# Patient Record
Sex: MALE | Race: BLACK OR AFRICAN AMERICAN | Employment: OTHER
[De-identification: names, ages, dates, MRNs, and addresses within clinical notes are randomized per-mention and may not be internally consistent; named-entity substitution may affect disease eponyms.]

---

## 2017-01-03 ENCOUNTER — OFFICE VISIT (OUTPATIENT)
Dept: INTERNAL MEDICINE | Facility: CLINIC | Age: 27
End: 2017-01-03

## 2017-01-03 VITALS
BODY MASS INDEX: 35.61 KG/M2 | WEIGHT: 235 LBS | SYSTOLIC BLOOD PRESSURE: 136 MMHG | DIASTOLIC BLOOD PRESSURE: 86 MMHG | HEART RATE: 87 BPM | RESPIRATION RATE: 20 BRPM | TEMPERATURE: 97.1 F | HEIGHT: 68 IN

## 2017-01-03 DIAGNOSIS — G47.33 OSA (OBSTRUCTIVE SLEEP APNEA): ICD-10-CM

## 2017-01-03 DIAGNOSIS — L29.9 ITCHING: ICD-10-CM

## 2017-01-03 DIAGNOSIS — F51.01 PRIMARY INSOMNIA: ICD-10-CM

## 2017-01-03 DIAGNOSIS — F17.200 SMOKER: ICD-10-CM

## 2017-01-03 DIAGNOSIS — F41.9 ANXIETY: ICD-10-CM

## 2017-01-03 DIAGNOSIS — E66.9 OBESITY (BMI 35.0-39.9 WITHOUT COMORBIDITY): ICD-10-CM

## 2017-01-03 DIAGNOSIS — R05.3 CHRONIC COUGH: ICD-10-CM

## 2017-01-03 DIAGNOSIS — F33.41 RECURRENT MAJOR DEPRESSIVE DISORDER, IN PARTIAL REMISSION (HCC): ICD-10-CM

## 2017-01-03 DIAGNOSIS — M08.00 JUVENILE RHEUMATOID ARTHRITIS (HCC): Primary | ICD-10-CM

## 2017-01-03 PROCEDURE — 99213 OFFICE O/P EST LOW 20 MIN: CPT | Performed by: INTERNAL MEDICINE

## 2017-01-03 RX ORDER — VARENICLINE TARTRATE 1 MG/1
1 TABLET, FILM COATED ORAL 2 TIMES DAILY
Qty: 60 TABLET | Refills: 3 | Status: SHIPPED | OUTPATIENT
Start: 2017-01-03 | End: 2017-03-17

## 2017-01-03 RX ORDER — PREDNISONE 20 MG/1
20 TABLET ORAL DAILY
Qty: 30 TABLET | Refills: 5 | Status: SHIPPED | OUTPATIENT
Start: 2017-01-03 | End: 2017-02-06

## 2017-01-03 RX ORDER — QUETIAPINE FUMARATE 400 MG/1
400 TABLET, FILM COATED ORAL NIGHTLY
Qty: 30 TABLET | Refills: 3 | Status: SHIPPED | OUTPATIENT
Start: 2017-01-03 | End: 2017-03-17

## 2017-01-03 RX ORDER — PANTOPRAZOLE SODIUM 40 MG/1
40 TABLET, DELAYED RELEASE ORAL DAILY
Qty: 30 TABLET | Refills: 11 | Status: SHIPPED | OUTPATIENT
Start: 2017-01-03 | End: 2017-03-17

## 2017-01-03 RX ORDER — LANOLIN ALCOHOL/MO/W.PET/CERES
3 CREAM (GRAM) TOPICAL DAILY
Qty: 30 TABLET | Refills: 3 | Status: SHIPPED | OUTPATIENT
Start: 2017-01-03 | End: 2017-03-17

## 2017-01-03 RX ORDER — MELOXICAM 7.5 MG/1
7.5 TABLET ORAL DAILY
Qty: 30 TABLET | Refills: 2 | Status: CANCELLED | OUTPATIENT
Start: 2017-01-03

## 2017-01-03 RX ORDER — SWAB
5000 SWAB, NON-MEDICATED MISCELLANEOUS DAILY
Qty: 150 CAPSULE | Refills: 3 | Status: SHIPPED | OUTPATIENT
Start: 2017-01-03 | End: 2017-01-05 | Stop reason: DRUGHIGH

## 2017-01-03 RX ORDER — IBUPROFEN 600 MG/1
600 TABLET ORAL 3 TIMES DAILY
Qty: 90 TABLET | Refills: 5 | Status: SHIPPED | OUTPATIENT
Start: 2017-01-03 | End: 2017-01-03

## 2017-01-03 RX ORDER — CYCLOBENZAPRINE HCL 10 MG
10 TABLET ORAL
COMMUNITY
Start: 2016-12-26 | End: 2017-01-10

## 2017-01-03 RX ORDER — GUAIFENESIN AND CODEINE PHOSPHATE 100; 10 MG/5ML; MG/5ML
5 SOLUTION ORAL 2 TIMES DAILY PRN
Qty: 1 BOTTLE | Refills: 3 | Status: SHIPPED | OUTPATIENT
Start: 2017-01-03 | End: 2017-01-05 | Stop reason: DRUGHIGH

## 2017-01-03 RX ORDER — MELOXICAM 15 MG/1
15 TABLET ORAL DAILY
Qty: 30 TABLET | Refills: 11 | Status: SHIPPED | OUTPATIENT
Start: 2017-01-03 | End: 2017-05-01 | Stop reason: SDUPTHER

## 2017-01-03 RX ORDER — POLYETHYLENE GLYCOL 3350 17 G/17G
17 POWDER, FOR SOLUTION ORAL DAILY
Qty: 510 G | Refills: 3 | Status: SHIPPED | OUTPATIENT
Start: 2017-01-03 | End: 2017-05-01 | Stop reason: ALTCHOICE

## 2017-01-03 RX ORDER — ERYTHROMYCIN AND BENZOYL PEROXIDE 30; 50 MG/G; MG/G
GEL TOPICAL
Qty: 23.6 G | Refills: 11 | Status: SHIPPED | OUTPATIENT
Start: 2017-01-03 | End: 2017-02-02

## 2017-01-03 RX ORDER — PROMETHAZINE HYDROCHLORIDE 25 MG/1
25 TABLET ORAL EVERY 8 HOURS PRN
Qty: 60 TABLET | Refills: 1 | Status: SHIPPED | OUTPATIENT
Start: 2017-01-03 | End: 2017-01-10

## 2017-01-04 DIAGNOSIS — R05.3 CHRONIC COUGH: Primary | ICD-10-CM

## 2017-01-04 DIAGNOSIS — M08.00 JUVENILE RHEUMATOID ARTHRITIS (HCC): ICD-10-CM

## 2017-01-05 RX ORDER — ERGOCALCIFEROL 1.25 MG/1
50000 CAPSULE ORAL WEEKLY
Qty: 30 CAPSULE | Refills: 3 | Status: ON HOLD | OUTPATIENT
Start: 2017-01-05 | End: 2018-06-26

## 2017-01-05 RX ORDER — GUAIFENESIN/DEXTROMETHORPHAN 100-10MG/5
5 SYRUP ORAL 3 TIMES DAILY PRN
Qty: 120 ML | Refills: 2 | Status: SHIPPED | OUTPATIENT
Start: 2017-01-05 | End: 2017-01-15

## 2017-01-06 ENCOUNTER — TELEPHONE (OUTPATIENT)
Dept: INTERNAL MEDICINE | Facility: CLINIC | Age: 27
End: 2017-01-06

## 2017-01-06 DIAGNOSIS — F51.01 PRIMARY INSOMNIA: ICD-10-CM

## 2017-01-06 RX ORDER — RAMELTEON 8 MG/1
8 TABLET ORAL NIGHTLY PRN
Qty: 30 TABLET | Refills: 3 | Status: SHIPPED | OUTPATIENT
Start: 2017-01-06 | End: 2017-03-17

## 2017-01-09 ENCOUNTER — TELEPHONE (OUTPATIENT)
Dept: INTERNAL MEDICINE | Facility: CLINIC | Age: 27
End: 2017-01-09

## 2017-01-09 PROBLEM — F33.41 RECURRENT MAJOR DEPRESSIVE DISORDER, IN PARTIAL REMISSION (HCC): Status: ACTIVE | Noted: 2017-01-09

## 2017-01-12 ENCOUNTER — OFFICE VISIT (OUTPATIENT)
Dept: INTERNAL MEDICINE | Facility: CLINIC | Age: 27
End: 2017-01-12

## 2017-01-12 VITALS
BODY MASS INDEX: 34.07 KG/M2 | WEIGHT: 230 LBS | SYSTOLIC BLOOD PRESSURE: 142 MMHG | DIASTOLIC BLOOD PRESSURE: 88 MMHG | HEIGHT: 69 IN | HEART RATE: 100 BPM

## 2017-01-12 DIAGNOSIS — S33.5XXD LOW BACK SPRAIN, SUBSEQUENT ENCOUNTER: Primary | ICD-10-CM

## 2017-01-12 DIAGNOSIS — F33.2 SEVERE EPISODE OF RECURRENT MAJOR DEPRESSIVE DISORDER, WITHOUT PSYCHOTIC FEATURES (HCC): ICD-10-CM

## 2017-01-12 DIAGNOSIS — F33.2 MAJOR DEPRESSIVE DISORDER, RECURRENT SEVERE WITHOUT PSYCHOTIC FEATURES (HCC): ICD-10-CM

## 2017-01-12 PROCEDURE — 99213 OFFICE O/P EST LOW 20 MIN: CPT | Performed by: INTERNAL MEDICINE

## 2017-01-12 RX ORDER — CYCLOBENZAPRINE HCL 5 MG
5 TABLET ORAL NIGHTLY
Qty: 14 TABLET | Refills: 1 | Status: SHIPPED | OUTPATIENT
Start: 2017-01-12 | End: 2017-01-22

## 2017-01-13 ENCOUNTER — TELEPHONE (OUTPATIENT)
Dept: INTERNAL MEDICINE | Facility: CLINIC | Age: 27
End: 2017-01-13

## 2017-02-06 ENCOUNTER — HOSPITAL ENCOUNTER (EMERGENCY)
Age: 27
Discharge: HOME | End: 2017-02-06
Attending: EMERGENCY MEDICINE
Payer: COMMERCIAL

## 2017-02-06 ENCOUNTER — APPOINTMENT (OUTPATIENT)
Dept: CT IMAGING | Age: 27
End: 2017-02-06
Payer: COMMERCIAL

## 2017-02-06 ENCOUNTER — OFFICE VISIT (OUTPATIENT)
Dept: INTERNAL MEDICINE | Facility: CLINIC | Age: 27
End: 2017-02-06

## 2017-02-06 ENCOUNTER — APPOINTMENT (OUTPATIENT)
Dept: GENERAL RADIOLOGY | Age: 27
End: 2017-02-06
Payer: COMMERCIAL

## 2017-02-06 VITALS
WEIGHT: 225 LBS | TEMPERATURE: 100.8 F | BODY MASS INDEX: 33.33 KG/M2 | HEART RATE: 85 BPM | OXYGEN SATURATION: 97 % | SYSTOLIC BLOOD PRESSURE: 111 MMHG | DIASTOLIC BLOOD PRESSURE: 80 MMHG | RESPIRATION RATE: 18 BRPM | HEIGHT: 69 IN

## 2017-02-06 VITALS
HEART RATE: 123 BPM | HEIGHT: 69 IN | SYSTOLIC BLOOD PRESSURE: 136 MMHG | DIASTOLIC BLOOD PRESSURE: 90 MMHG | RESPIRATION RATE: 20 BRPM | TEMPERATURE: 98.3 F | OXYGEN SATURATION: 97 % | BODY MASS INDEX: 33.33 KG/M2 | WEIGHT: 225 LBS

## 2017-02-06 DIAGNOSIS — K52.9 GASTROENTERITIS: Primary | ICD-10-CM

## 2017-02-06 DIAGNOSIS — M08.00 JUVENILE RHEUMATOID ARTHRITIS (HCC): ICD-10-CM

## 2017-02-06 DIAGNOSIS — B34.9 VIRAL SYNDROME: Primary | ICD-10-CM

## 2017-02-06 LAB
-: NORMAL
ABSOLUTE EOS #: 0.3 K/UL (ref 0–0.4)
ABSOLUTE LYMPH #: 1.4 K/UL (ref 1–4.8)
ABSOLUTE MONO #: 0.5 K/UL (ref 0.1–1.2)
ALBUMIN SERPL-MCNC: 4.3 G/DL (ref 3.5–5.2)
ALBUMIN/GLOBULIN RATIO: 1.1 (ref 1–2.5)
ALP BLD-CCNC: 80 U/L (ref 40–129)
ALT SERPL-CCNC: 52 U/L (ref 5–41)
AMORPHOUS: NORMAL
ANION GAP SERPL CALCULATED.3IONS-SCNC: 13 MMOL/L (ref 9–17)
AST SERPL-CCNC: 42 U/L
BACTERIA: NORMAL
BASOPHILS # BLD: 0 % (ref 0–2)
BASOPHILS ABSOLUTE: 0 K/UL (ref 0–0.2)
BILIRUB SERPL-MCNC: 0.3 MG/DL (ref 0.3–1.2)
BILIRUBIN DIRECT: <0.08 MG/DL
BILIRUBIN URINE: NEGATIVE
BILIRUBIN, INDIRECT: ABNORMAL MG/DL (ref 0–1)
BUN BLDV-MCNC: 10 MG/DL (ref 6–20)
BUN/CREAT BLD: NORMAL (ref 9–20)
CALCIUM SERPL-MCNC: 9.4 MG/DL (ref 8.6–10.4)
CASTS UA: NORMAL /LPF (ref 0–8)
CHLORIDE BLD-SCNC: 99 MMOL/L (ref 98–107)
CO2: 26 MMOL/L (ref 20–31)
COLOR: YELLOW
CREAT SERPL-MCNC: 0.87 MG/DL (ref 0.7–1.2)
CRYSTALS, UA: NORMAL /HPF
DIFFERENTIAL TYPE: ABNORMAL
DIRECT EXAM: NORMAL
EOSINOPHILS RELATIVE PERCENT: 5 % (ref 1–4)
EPITHELIAL CELLS UA: NORMAL /HPF (ref 0–5)
GFR AFRICAN AMERICAN: >60 ML/MIN
GFR NON-AFRICAN AMERICAN: >60 ML/MIN
GFR SERPL CREATININE-BSD FRML MDRD: NORMAL ML/MIN/{1.73_M2}
GFR SERPL CREATININE-BSD FRML MDRD: NORMAL ML/MIN/{1.73_M2}
GLOBULIN: ABNORMAL G/DL (ref 1.5–3.8)
GLUCOSE BLD-MCNC: 95 MG/DL (ref 70–99)
GLUCOSE URINE: NEGATIVE
HCT VFR BLD CALC: 43.3 % (ref 41–53)
HEMOGLOBIN: 14.8 G/DL (ref 13.5–17.5)
KETONES, URINE: NEGATIVE
LACTIC ACID, WHOLE BLOOD: 1.1 MMOL/L (ref 0.7–2.1)
LACTIC ACID: NORMAL MMOL/L
LEUKOCYTE ESTERASE, URINE: NEGATIVE
LIPASE: 26 U/L (ref 13–60)
LYMPHOCYTES # BLD: 24 % (ref 24–44)
Lab: NORMAL
MCH RBC QN AUTO: 29.5 PG (ref 26–34)
MCHC RBC AUTO-ENTMCNC: 34.1 G/DL (ref 31–37)
MCV RBC AUTO: 86.3 FL (ref 80–100)
MONOCYTES # BLD: 9 % (ref 2–11)
MUCUS: NORMAL
NITRITE, URINE: NEGATIVE
OTHER OBSERVATIONS UA: NORMAL
PDW BLD-RTO: 13.2 % (ref 12.5–15.4)
PH UA: 6 (ref 5–8)
PLATELET # BLD: 291 K/UL (ref 140–450)
PLATELET ESTIMATE: ABNORMAL
PMV BLD AUTO: 7.8 FL (ref 6–12)
POC TROPONIN I: 0 NG/ML (ref 0–0.1)
POC TROPONIN INTERP: NORMAL
POTASSIUM SERPL-SCNC: 4.3 MMOL/L (ref 3.7–5.3)
PROTEIN UA: NEGATIVE
RBC # BLD: 5.01 M/UL (ref 4.5–5.9)
RBC # BLD: ABNORMAL 10*6/UL
RBC UA: NORMAL /HPF (ref 0–4)
RENAL EPITHELIAL, UA: NORMAL /HPF
SEG NEUTROPHILS: 62 % (ref 36–66)
SEGMENTED NEUTROPHILS ABSOLUTE COUNT: 3.6 K/UL (ref 1.8–7.7)
SODIUM BLD-SCNC: 138 MMOL/L (ref 135–144)
SPECIFIC GRAVITY UA: 1.03 (ref 1–1.03)
SPECIMEN DESCRIPTION: NORMAL
STATUS: NORMAL
TOTAL PROTEIN: 8.1 G/DL (ref 6.4–8.3)
TRICHOMONAS: NORMAL
TURBIDITY: CLEAR
URINE HGB: NEGATIVE
UROBILINOGEN, URINE: NORMAL
WBC # BLD: 5.9 K/UL (ref 3.5–11)
WBC # BLD: ABNORMAL 10*3/UL
WBC UA: NORMAL /HPF (ref 0–5)
YEAST: NORMAL

## 2017-02-06 PROCEDURE — 71020 XR CHEST STANDARD TWO VW: CPT | Performed by: RADIOLOGY

## 2017-02-06 PROCEDURE — 6360000002 HC RX W HCPCS: Performed by: EMERGENCY MEDICINE

## 2017-02-06 PROCEDURE — 81001 URINALYSIS AUTO W/SCOPE: CPT

## 2017-02-06 PROCEDURE — 80076 HEPATIC FUNCTION PANEL: CPT

## 2017-02-06 PROCEDURE — 85025 COMPLETE CBC W/AUTO DIFF WBC: CPT

## 2017-02-06 PROCEDURE — 6360000002 HC RX W HCPCS: Performed by: PHYSICIAN ASSISTANT

## 2017-02-06 PROCEDURE — 74176 CT ABD & PELVIS W/O CONTRAST: CPT

## 2017-02-06 PROCEDURE — 83605 ASSAY OF LACTIC ACID: CPT

## 2017-02-06 PROCEDURE — 93005 ELECTROCARDIOGRAM TRACING: CPT

## 2017-02-06 PROCEDURE — 99213 OFFICE O/P EST LOW 20 MIN: CPT | Performed by: HOSPITALIST

## 2017-02-06 PROCEDURE — 2580000003 HC RX 258: Performed by: PHYSICIAN ASSISTANT

## 2017-02-06 PROCEDURE — 83690 ASSAY OF LIPASE: CPT

## 2017-02-06 PROCEDURE — G0384 LEV 5 HOSP TYPE B ED VISIT: HCPCS

## 2017-02-06 PROCEDURE — 96374 THER/PROPH/DIAG INJ IV PUSH: CPT

## 2017-02-06 PROCEDURE — 87804 INFLUENZA ASSAY W/OPTIC: CPT

## 2017-02-06 PROCEDURE — 84484 ASSAY OF TROPONIN QUANT: CPT

## 2017-02-06 PROCEDURE — 74176 CT ABD & PELVIS W/O CONTRAST: CPT | Performed by: RADIOLOGY

## 2017-02-06 PROCEDURE — 71020 XR CHEST STANDARD TWO VW: CPT

## 2017-02-06 PROCEDURE — 80048 BASIC METABOLIC PNL TOTAL CA: CPT

## 2017-02-06 PROCEDURE — 96375 TX/PRO/DX INJ NEW DRUG ADDON: CPT

## 2017-02-06 RX ORDER — MORPHINE SULFATE 4 MG/ML
4 INJECTION, SOLUTION INTRAMUSCULAR; INTRAVENOUS ONCE
Status: COMPLETED | OUTPATIENT
Start: 2017-02-06 | End: 2017-02-06

## 2017-02-06 RX ORDER — METHYLPREDNISOLONE SODIUM SUCCINATE 125 MG/2ML
125 INJECTION, POWDER, LYOPHILIZED, FOR SOLUTION INTRAMUSCULAR; INTRAVENOUS ONCE
Status: COMPLETED | OUTPATIENT
Start: 2017-02-06 | End: 2017-02-06

## 2017-02-06 RX ORDER — CLOTRIMAZOLE 1 %
CREAM (GRAM) TOPICAL
COMMUNITY
End: 2017-05-01 | Stop reason: SDUPTHER

## 2017-02-06 RX ORDER — 0.9 % SODIUM CHLORIDE 0.9 %
1000 INTRAVENOUS SOLUTION INTRAVENOUS ONCE
Status: COMPLETED | OUTPATIENT
Start: 2017-02-06 | End: 2017-02-06

## 2017-02-06 RX ORDER — KETOROLAC TROMETHAMINE 30 MG/ML
30 INJECTION, SOLUTION INTRAMUSCULAR; INTRAVENOUS ONCE
Status: COMPLETED | OUTPATIENT
Start: 2017-02-06 | End: 2017-02-06

## 2017-02-06 RX ORDER — ONDANSETRON 2 MG/ML
4 INJECTION INTRAMUSCULAR; INTRAVENOUS ONCE
Status: COMPLETED | OUTPATIENT
Start: 2017-02-06 | End: 2017-02-06

## 2017-02-06 RX ORDER — PREDNISONE 20 MG/1
40 TABLET ORAL DAILY
Qty: 10 TABLET | Refills: 0 | Status: SHIPPED | OUTPATIENT
Start: 2017-02-06 | End: 2017-02-11

## 2017-02-06 RX ADMIN — HYDROMORPHONE HYDROCHLORIDE 1 MG: 1 INJECTION, SOLUTION INTRAMUSCULAR; INTRAVENOUS; SUBCUTANEOUS at 14:19

## 2017-02-06 RX ADMIN — ONDANSETRON 4 MG: 2 INJECTION, SOLUTION INTRAMUSCULAR; INTRAVENOUS at 12:56

## 2017-02-06 RX ADMIN — METHYLPREDNISOLONE SODIUM SUCCINATE 125 MG: 125 INJECTION, POWDER, FOR SOLUTION INTRAMUSCULAR; INTRAVENOUS at 12:59

## 2017-02-06 RX ADMIN — MORPHINE SULFATE 4 MG: 4 INJECTION, SOLUTION INTRAMUSCULAR; INTRAVENOUS at 12:57

## 2017-02-06 RX ADMIN — KETOROLAC TROMETHAMINE 30 MG: 30 INJECTION, SOLUTION INTRAMUSCULAR at 15:31

## 2017-02-06 RX ADMIN — SODIUM CHLORIDE 1000 ML: 9 INJECTION, SOLUTION INTRAVENOUS at 15:31

## 2017-02-06 RX ADMIN — SODIUM CHLORIDE 1000 ML: 9 INJECTION, SOLUTION INTRAVENOUS at 12:55

## 2017-02-06 ASSESSMENT — ENCOUNTER SYMPTOMS
SORE THROAT: 0
VOMITING: 1
ALLERGIC/IMMUNOLOGIC NEGATIVE: 1
SHORTNESS OF BREATH: 1
PHOTOPHOBIA: 0
TROUBLE SWALLOWING: 0
NAUSEA: 1
COUGH: 0
SINUS PRESSURE: 0
DIARRHEA: 1
EYE DISCHARGE: 0
EYES NEGATIVE: 1
ABDOMINAL PAIN: 1
VOICE CHANGE: 0

## 2017-02-06 ASSESSMENT — PATIENT HEALTH QUESTIONNAIRE - PHQ9
1. LITTLE INTEREST OR PLEASURE IN DOING THINGS: 0
2. FEELING DOWN, DEPRESSED OR HOPELESS: 0
SUM OF ALL RESPONSES TO PHQ9 QUESTIONS 1 & 2: 0
SUM OF ALL RESPONSES TO PHQ QUESTIONS 1-9: 0

## 2017-02-06 ASSESSMENT — PAIN DESCRIPTION - LOCATION: LOCATION: CHEST;ABDOMEN

## 2017-02-06 ASSESSMENT — PAIN SCALES - GENERAL
PAINLEVEL_OUTOF10: 9
PAINLEVEL_OUTOF10: 9
PAINLEVEL_OUTOF10: 10
PAINLEVEL_OUTOF10: 9

## 2017-02-06 ASSESSMENT — PAIN DESCRIPTION - PAIN TYPE: TYPE: ACUTE PAIN

## 2017-02-07 LAB
EKG ATRIAL RATE: 96 BPM
EKG P AXIS: 41 DEGREES
EKG P-R INTERVAL: 134 MS
EKG Q-T INTERVAL: 316 MS
EKG QRS DURATION: 70 MS
EKG QTC CALCULATION (BAZETT): 399 MS
EKG R AXIS: 35 DEGREES
EKG T AXIS: 14 DEGREES
EKG VENTRICULAR RATE: 96 BPM

## 2017-02-14 ENCOUNTER — OFFICE VISIT (OUTPATIENT)
Dept: INTERNAL MEDICINE | Facility: CLINIC | Age: 27
End: 2017-02-14

## 2017-02-14 ENCOUNTER — CASE MANAGEMENT (OUTPATIENT)
Dept: BEHAVIORAL/MENTAL HEALTH | Facility: CLINIC | Age: 27
End: 2017-02-14

## 2017-02-14 VITALS
SYSTOLIC BLOOD PRESSURE: 130 MMHG | WEIGHT: 228 LBS | HEIGHT: 69 IN | HEART RATE: 106 BPM | DIASTOLIC BLOOD PRESSURE: 85 MMHG | BODY MASS INDEX: 33.77 KG/M2

## 2017-02-14 DIAGNOSIS — K80.46 CALCULUS OF BILE DUCT WITH ACUTE ON CHRONIC CHOLECYSTITIS WITHOUT OBSTRUCTION: ICD-10-CM

## 2017-02-14 DIAGNOSIS — R07.82 INTERCOSTAL PAIN: Primary | ICD-10-CM

## 2017-02-14 DIAGNOSIS — R07.82 INTERCOSTAL PAIN: ICD-10-CM

## 2017-02-14 PROBLEM — J45.20 MILD INTERMITTENT ASTHMA WITHOUT COMPLICATION: Status: ACTIVE | Noted: 2017-02-14

## 2017-02-14 PROBLEM — F33.9 RECURRENT MAJOR DEPRESSIVE DISORDER (HCC): Status: ACTIVE | Noted: 2017-02-14

## 2017-02-14 PROCEDURE — 99213 OFFICE O/P EST LOW 20 MIN: CPT | Performed by: INTERNAL MEDICINE

## 2017-02-14 PROCEDURE — 93005 ELECTROCARDIOGRAM TRACING: CPT | Performed by: INTERNAL MEDICINE

## 2017-02-14 RX ORDER — PROMETHAZINE HYDROCHLORIDE 25 MG/1
25 TABLET ORAL EVERY 6 HOURS PRN
Qty: 30 TABLET | Refills: 1 | Status: SHIPPED | OUTPATIENT
Start: 2017-02-14 | End: 2017-02-14 | Stop reason: CLARIF

## 2017-02-14 RX ORDER — GABAPENTIN 300 MG/1
300 CAPSULE ORAL DAILY
Qty: 90 CAPSULE | Refills: 1 | Status: SHIPPED | OUTPATIENT
Start: 2017-02-14 | End: 2017-02-14 | Stop reason: CLARIF

## 2017-02-16 ENCOUNTER — APPOINTMENT (OUTPATIENT)
Dept: GENERAL RADIOLOGY | Age: 27
End: 2017-02-16
Payer: COMMERCIAL

## 2017-02-16 ENCOUNTER — HOSPITAL ENCOUNTER (EMERGENCY)
Age: 27
Discharge: HOME | End: 2017-02-16
Attending: EMERGENCY MEDICINE
Payer: COMMERCIAL

## 2017-02-16 VITALS
HEIGHT: 69 IN | WEIGHT: 240 LBS | SYSTOLIC BLOOD PRESSURE: 110 MMHG | BODY MASS INDEX: 35.55 KG/M2 | DIASTOLIC BLOOD PRESSURE: 57 MMHG | TEMPERATURE: 97.9 F | OXYGEN SATURATION: 98 % | HEART RATE: 78 BPM | RESPIRATION RATE: 16 BRPM

## 2017-02-16 DIAGNOSIS — M79.662 BILATERAL CALF PAIN: ICD-10-CM

## 2017-02-16 DIAGNOSIS — M79.661 BILATERAL CALF PAIN: ICD-10-CM

## 2017-02-16 DIAGNOSIS — R10.11 RIGHT UPPER QUADRANT ABDOMINAL PAIN: Primary | ICD-10-CM

## 2017-02-16 DIAGNOSIS — R07.9 CHEST PAIN, UNSPECIFIED TYPE: ICD-10-CM

## 2017-02-16 DIAGNOSIS — R11.0 NAUSEA: ICD-10-CM

## 2017-02-16 LAB
ABSOLUTE EOS #: 0.1 K/UL (ref 0–0.4)
ABSOLUTE LYMPH #: 1.9 K/UL (ref 1–4.8)
ABSOLUTE MONO #: 0.4 K/UL (ref 0.1–1.2)
ALBUMIN SERPL-MCNC: 4.5 G/DL (ref 3.5–5.2)
ALBUMIN/GLOBULIN RATIO: 1.4 (ref 1–2.5)
ALP BLD-CCNC: 68 U/L (ref 40–129)
ALT SERPL-CCNC: 22 U/L (ref 5–41)
ANION GAP SERPL CALCULATED.3IONS-SCNC: 13 MMOL/L (ref 9–17)
AST SERPL-CCNC: 28 U/L
BASOPHILS # BLD: 1 % (ref 0–2)
BASOPHILS ABSOLUTE: 0.1 K/UL (ref 0–0.2)
BILIRUB SERPL-MCNC: 0.31 MG/DL (ref 0.3–1.2)
BILIRUBIN DIRECT: <0.08 MG/DL
BILIRUBIN, INDIRECT: NORMAL MG/DL (ref 0–1)
BUN BLDV-MCNC: 14 MG/DL (ref 6–20)
BUN/CREAT BLD: ABNORMAL (ref 9–20)
CALCIUM SERPL-MCNC: 9.2 MG/DL (ref 8.6–10.4)
CHLORIDE BLD-SCNC: 101 MMOL/L (ref 98–107)
CO2: 26 MMOL/L (ref 20–31)
CREAT SERPL-MCNC: 0.75 MG/DL (ref 0.7–1.2)
D-DIMER QUANTITATIVE: 0.23 MG/L FEU
DIFFERENTIAL TYPE: ABNORMAL
EOSINOPHILS RELATIVE PERCENT: 1 % (ref 1–4)
GFR AFRICAN AMERICAN: >60 ML/MIN
GFR NON-AFRICAN AMERICAN: >60 ML/MIN
GFR SERPL CREATININE-BSD FRML MDRD: ABNORMAL ML/MIN/{1.73_M2}
GFR SERPL CREATININE-BSD FRML MDRD: ABNORMAL ML/MIN/{1.73_M2}
GLOBULIN: NORMAL G/DL (ref 1.5–3.8)
GLUCOSE BLD-MCNC: 101 MG/DL (ref 70–99)
HCT VFR BLD CALC: 40.7 % (ref 41–53)
HEMOGLOBIN: 13.9 G/DL (ref 13.5–17.5)
LIPASE: 46 U/L (ref 13–60)
LYMPHOCYTES # BLD: 26 % (ref 24–44)
MCH RBC QN AUTO: 29.4 PG (ref 26–34)
MCHC RBC AUTO-ENTMCNC: 34.1 G/DL (ref 31–37)
MCV RBC AUTO: 86.3 FL (ref 80–100)
MONOCYTES # BLD: 5 % (ref 2–11)
PDW BLD-RTO: 13.2 % (ref 12.5–15.4)
PLATELET # BLD: 277 K/UL (ref 140–450)
PLATELET ESTIMATE: ABNORMAL
PMV BLD AUTO: 8 FL (ref 6–12)
POTASSIUM SERPL-SCNC: 5 MMOL/L (ref 3.7–5.3)
RBC # BLD: 4.71 M/UL (ref 4.5–5.9)
RBC # BLD: ABNORMAL 10*6/UL
SEG NEUTROPHILS: 67 % (ref 36–66)
SEGMENTED NEUTROPHILS ABSOLUTE COUNT: 4.9 K/UL (ref 1.8–7.7)
SODIUM BLD-SCNC: 140 MMOL/L (ref 135–144)
TOTAL PROTEIN: 7.7 G/DL (ref 6.4–8.3)
WBC # BLD: 7.4 K/UL (ref 3.5–11)
WBC # BLD: ABNORMAL 10*3/UL

## 2017-02-16 PROCEDURE — 85025 COMPLETE CBC W/AUTO DIFF WBC: CPT

## 2017-02-16 PROCEDURE — 80048 BASIC METABOLIC PNL TOTAL CA: CPT

## 2017-02-16 PROCEDURE — 96375 TX/PRO/DX INJ NEW DRUG ADDON: CPT

## 2017-02-16 PROCEDURE — 85379 FIBRIN DEGRADATION QUANT: CPT

## 2017-02-16 PROCEDURE — 80076 HEPATIC FUNCTION PANEL: CPT

## 2017-02-16 PROCEDURE — 83690 ASSAY OF LIPASE: CPT

## 2017-02-16 PROCEDURE — 93005 ELECTROCARDIOGRAM TRACING: CPT

## 2017-02-16 PROCEDURE — 96374 THER/PROPH/DIAG INJ IV PUSH: CPT

## 2017-02-16 PROCEDURE — 99285 EMERGENCY DEPT VISIT HI MDM: CPT

## 2017-02-16 PROCEDURE — 2580000003 HC RX 258: Performed by: EMERGENCY MEDICINE

## 2017-02-16 PROCEDURE — 93970 EXTREMITY STUDY: CPT

## 2017-02-16 PROCEDURE — 6360000002 HC RX W HCPCS: Performed by: EMERGENCY MEDICINE

## 2017-02-16 PROCEDURE — 71020 XR CHEST STANDARD TWO VW: CPT

## 2017-02-16 RX ORDER — ACETAMINOPHEN 500 MG
1000 TABLET ORAL ONCE
Status: DISCONTINUED | OUTPATIENT
Start: 2017-02-16 | End: 2017-02-16 | Stop reason: HOSPADM

## 2017-02-16 RX ORDER — PROMETHAZINE HYDROCHLORIDE 25 MG/ML
12.5 INJECTION, SOLUTION INTRAMUSCULAR; INTRAVENOUS ONCE
Status: COMPLETED | OUTPATIENT
Start: 2017-02-16 | End: 2017-02-16

## 2017-02-16 RX ORDER — ONDANSETRON 2 MG/ML
4 INJECTION INTRAMUSCULAR; INTRAVENOUS ONCE
Status: COMPLETED | OUTPATIENT
Start: 2017-02-16 | End: 2017-02-16

## 2017-02-16 RX ORDER — 0.9 % SODIUM CHLORIDE 0.9 %
1000 INTRAVENOUS SOLUTION INTRAVENOUS ONCE
Status: COMPLETED | OUTPATIENT
Start: 2017-02-16 | End: 2017-02-16

## 2017-02-16 RX ADMIN — PROMETHAZINE HYDROCHLORIDE 12.5 MG: 25 INJECTION INTRAMUSCULAR; INTRAVENOUS at 14:50

## 2017-02-16 RX ADMIN — ONDANSETRON 4 MG: 2 INJECTION, SOLUTION INTRAMUSCULAR; INTRAVENOUS at 13:04

## 2017-02-16 RX ADMIN — SODIUM CHLORIDE 1000 ML: 9 INJECTION, SOLUTION INTRAVENOUS at 13:04

## 2017-02-16 ASSESSMENT — PAIN DESCRIPTION - DESCRIPTORS
DESCRIPTORS: CONSTANT
DESCRIPTORS_2: CONSTANT

## 2017-02-16 ASSESSMENT — ENCOUNTER SYMPTOMS
VOMITING: 1
ALLERGIC/IMMUNOLOGIC NEGATIVE: 1
CONSTIPATION: 0
DIARRHEA: 1
ABDOMINAL PAIN: 1
COUGH: 0
NAUSEA: 1
SHORTNESS OF BREATH: 0

## 2017-02-16 ASSESSMENT — PAIN DESCRIPTION - DURATION: DURATION_2: CONTINUOUS

## 2017-02-16 ASSESSMENT — PAIN DESCRIPTION - ONSET
ONSET: GRADUAL
ONSET_2: GRADUAL

## 2017-02-16 ASSESSMENT — PAIN DESCRIPTION - FREQUENCY: FREQUENCY: CONTINUOUS

## 2017-02-16 ASSESSMENT — PAIN DESCRIPTION - ORIENTATION
ORIENTATION_2: MID
ORIENTATION: RIGHT;LOWER

## 2017-02-16 ASSESSMENT — PAIN SCALES - GENERAL: PAINLEVEL_OUTOF10: 9

## 2017-02-16 ASSESSMENT — PAIN DESCRIPTION - PAIN TYPE: TYPE: ACUTE PAIN

## 2017-02-16 ASSESSMENT — PAIN DESCRIPTION - INTENSITY: RATING_2: 10

## 2017-02-16 ASSESSMENT — PAIN DESCRIPTION - LOCATION
LOCATION: ABDOMEN
LOCATION_2: CHEST

## 2017-02-17 LAB
EKG ATRIAL RATE: 107 BPM
EKG ATRIAL RATE: 81 BPM
EKG P AXIS: 61 DEGREES
EKG P AXIS: 65 DEGREES
EKG P-R INTERVAL: 128 MS
EKG P-R INTERVAL: 134 MS
EKG Q-T INTERVAL: 320 MS
EKG Q-T INTERVAL: 350 MS
EKG QRS DURATION: 82 MS
EKG QRS DURATION: 84 MS
EKG QTC CALCULATION (BAZETT): 406 MS
EKG QTC CALCULATION (BAZETT): 427 MS
EKG R AXIS: 58 DEGREES
EKG R AXIS: 63 DEGREES
EKG T AXIS: 41 DEGREES
EKG T AXIS: 44 DEGREES
EKG VENTRICULAR RATE: 107 BPM
EKG VENTRICULAR RATE: 81 BPM

## 2017-02-21 ENCOUNTER — HOSPITAL ENCOUNTER (OUTPATIENT)
Age: 27
Setting detail: SPECIMEN
Discharge: HOME | End: 2017-02-21
Payer: COMMERCIAL

## 2017-02-21 LAB
ABSOLUTE EOS #: 0.2 K/UL (ref 0–0.4)
ABSOLUTE LYMPH #: 2.9 K/UL (ref 1–4.8)
ABSOLUTE MONO #: 0.6 K/UL (ref 0.1–1.2)
ALBUMIN SERPL-MCNC: 4.8 G/DL (ref 3.5–5.2)
ALBUMIN/GLOBULIN RATIO: 1.5 (ref 1–2.5)
ALP BLD-CCNC: 78 U/L (ref 40–129)
ALT SERPL-CCNC: 29 U/L (ref 5–41)
ANION GAP SERPL CALCULATED.3IONS-SCNC: 18 MMOL/L (ref 9–17)
AST SERPL-CCNC: 28 U/L
BASOPHILS # BLD: 0 % (ref 0–2)
BASOPHILS ABSOLUTE: 0 K/UL (ref 0–0.2)
BILIRUB SERPL-MCNC: 0.29 MG/DL (ref 0.3–1.2)
BUN BLDV-MCNC: 11 MG/DL (ref 6–20)
BUN/CREAT BLD: ABNORMAL (ref 9–20)
CALCIUM SERPL-MCNC: 9.6 MG/DL (ref 8.6–10.4)
CHLORIDE BLD-SCNC: 100 MMOL/L (ref 98–107)
CHOLESTEROL/HDL RATIO: 4.7
CHOLESTEROL: 205 MG/DL
CO2: 24 MMOL/L (ref 20–31)
CREAT SERPL-MCNC: 0.88 MG/DL (ref 0.7–1.2)
DIFFERENTIAL TYPE: NORMAL
EOSINOPHILS RELATIVE PERCENT: 3 % (ref 1–4)
GFR AFRICAN AMERICAN: >60 ML/MIN
GFR NON-AFRICAN AMERICAN: >60 ML/MIN
GFR SERPL CREATININE-BSD FRML MDRD: ABNORMAL ML/MIN/{1.73_M2}
GFR SERPL CREATININE-BSD FRML MDRD: ABNORMAL ML/MIN/{1.73_M2}
GLUCOSE BLD-MCNC: 106 MG/DL (ref 70–99)
HAV IGM SER IA-ACNC: NONREACTIVE
HCT VFR BLD CALC: 42.1 % (ref 41–53)
HDLC SERPL-MCNC: 44 MG/DL
HEMOGLOBIN: 14.5 G/DL (ref 13.5–17.5)
HEPATITIS B CORE IGM ANTIBODY: NONREACTIVE
HEPATITIS B SURFACE ANTIGEN: NONREACTIVE
HEPATITIS C ANTIBODY: NONREACTIVE
HIV AG/AB: NONREACTIVE
LDL CHOLESTEROL: 123 MG/DL (ref 0–130)
LYMPHOCYTES # BLD: 33 % (ref 24–44)
MCH RBC QN AUTO: 29.5 PG (ref 26–34)
MCHC RBC AUTO-ENTMCNC: 34.5 G/DL (ref 31–37)
MCV RBC AUTO: 85.6 FL (ref 80–100)
MONOCYTES # BLD: 7 % (ref 2–11)
PDW BLD-RTO: 13.6 % (ref 12.5–15.4)
PLATELET # BLD: 313 K/UL (ref 140–450)
PLATELET ESTIMATE: NORMAL
PMV BLD AUTO: 8.4 FL (ref 6–12)
POTASSIUM SERPL-SCNC: 4.1 MMOL/L (ref 3.7–5.3)
RBC # BLD: 4.92 M/UL (ref 4.5–5.9)
RBC # BLD: NORMAL 10*6/UL
SEG NEUTROPHILS: 57 % (ref 36–66)
SEGMENTED NEUTROPHILS ABSOLUTE COUNT: 4.8 K/UL (ref 1.8–7.7)
SODIUM BLD-SCNC: 142 MMOL/L (ref 135–144)
T3 FREE: 2.66 PG/ML (ref 2.02–4.43)
THYROXINE, FREE: 1.18 NG/DL (ref 0.93–1.7)
TOTAL PROTEIN: 7.9 G/DL (ref 6.4–8.3)
TRIGL SERPL-MCNC: 189 MG/DL
TSH SERPL DL<=0.05 MIU/L-ACNC: 1.86 MIU/L (ref 0.3–5)
VLDLC SERPL CALC-MCNC: ABNORMAL MG/DL (ref 1–30)
WBC # BLD: 8.5 K/UL (ref 3.5–11)
WBC # BLD: NORMAL 10*3/UL

## 2017-02-21 PROCEDURE — 84443 ASSAY THYROID STIM HORMONE: CPT

## 2017-02-21 PROCEDURE — 84481 FREE ASSAY (FT-3): CPT

## 2017-02-21 PROCEDURE — 80061 LIPID PANEL: CPT

## 2017-02-21 PROCEDURE — 36415 COLL VENOUS BLD VENIPUNCTURE: CPT

## 2017-02-21 PROCEDURE — 84439 ASSAY OF FREE THYROXINE: CPT

## 2017-02-21 PROCEDURE — 87389 HIV-1 AG W/HIV-1&-2 AB AG IA: CPT

## 2017-02-21 PROCEDURE — 80074 ACUTE HEPATITIS PANEL: CPT

## 2017-02-21 PROCEDURE — 85025 COMPLETE CBC W/AUTO DIFF WBC: CPT

## 2017-02-21 PROCEDURE — 80053 COMPREHEN METABOLIC PANEL: CPT

## 2017-03-01 RX ORDER — GUAIFENESIN AND DEXTROMETHORPHAN HYDROBROMIDE 100; 10 MG/5ML; MG/5ML
LIQUID ORAL
Qty: 1 BOTTLE | Refills: 0 | Status: SHIPPED | OUTPATIENT
Start: 2017-03-01 | End: 2017-05-01 | Stop reason: ALTCHOICE

## 2017-03-05 ENCOUNTER — HOSPITAL ENCOUNTER (EMERGENCY)
Age: 27
Discharge: HOME | End: 2017-03-05
Attending: EMERGENCY MEDICINE
Payer: COMMERCIAL

## 2017-03-05 VITALS
DIASTOLIC BLOOD PRESSURE: 89 MMHG | BODY MASS INDEX: 32.49 KG/M2 | HEART RATE: 94 BPM | OXYGEN SATURATION: 98 % | TEMPERATURE: 97.9 F | SYSTOLIC BLOOD PRESSURE: 159 MMHG | WEIGHT: 220 LBS | RESPIRATION RATE: 18 BRPM

## 2017-03-05 DIAGNOSIS — R11.2 NAUSEA AND VOMITING, INTRACTABILITY OF VOMITING NOT SPECIFIED, UNSPECIFIED VOMITING TYPE: ICD-10-CM

## 2017-03-05 DIAGNOSIS — G43.909 MIGRAINE WITHOUT STATUS MIGRAINOSUS, NOT INTRACTABLE, UNSPECIFIED MIGRAINE TYPE: Primary | ICD-10-CM

## 2017-03-05 LAB
ABSOLUTE EOS #: 0.3 K/UL (ref 0–0.4)
ABSOLUTE LYMPH #: 1.9 K/UL (ref 1–4.8)
ABSOLUTE MONO #: 0.5 K/UL (ref 0.1–1.2)
ALBUMIN SERPL-MCNC: 4.2 G/DL (ref 3.5–5.2)
ALBUMIN/GLOBULIN RATIO: NORMAL (ref 1–2.5)
ALP BLD-CCNC: 64 U/L (ref 40–129)
ALT SERPL-CCNC: 29 U/L (ref 5–41)
ANION GAP SERPL CALCULATED.3IONS-SCNC: 16 MMOL/L (ref 9–17)
AST SERPL-CCNC: 29 U/L
BASOPHILS # BLD: 1 % (ref 0–2)
BASOPHILS ABSOLUTE: 0 K/UL (ref 0–0.2)
BILIRUB SERPL-MCNC: 0.32 MG/DL (ref 0.3–1.2)
BUN BLDV-MCNC: 11 MG/DL (ref 6–20)
BUN/CREAT BLD: 13 (ref 9–20)
CALCIUM SERPL-MCNC: 9.1 MG/DL (ref 8.6–10.4)
CHLORIDE BLD-SCNC: 99 MMOL/L (ref 98–107)
CO2: 24 MMOL/L (ref 20–31)
CREAT SERPL-MCNC: 0.82 MG/DL (ref 0.7–1.2)
DIFFERENTIAL TYPE: ABNORMAL
EOSINOPHILS RELATIVE PERCENT: 6 % (ref 1–4)
GFR AFRICAN AMERICAN: >60 ML/MIN
GFR NON-AFRICAN AMERICAN: >60 ML/MIN
GFR SERPL CREATININE-BSD FRML MDRD: NORMAL ML/MIN/{1.73_M2}
GFR SERPL CREATININE-BSD FRML MDRD: NORMAL ML/MIN/{1.73_M2}
GLUCOSE BLD-MCNC: 86 MG/DL (ref 70–99)
HCT VFR BLD CALC: 40.6 % (ref 41–53)
HEMOGLOBIN: 13.8 G/DL (ref 13.5–17.5)
LIPASE: 22 U/L (ref 13–60)
LYMPHOCYTES # BLD: 40 % (ref 24–44)
MCH RBC QN AUTO: 29.3 PG (ref 26–34)
MCHC RBC AUTO-ENTMCNC: 34.1 G/DL (ref 31–37)
MCV RBC AUTO: 85.9 FL (ref 80–100)
MONOCYTES # BLD: 11 % (ref 2–11)
PDW BLD-RTO: 13.4 % (ref 12.5–15.4)
PLATELET # BLD: 233 K/UL (ref 140–450)
PLATELET ESTIMATE: ABNORMAL
PMV BLD AUTO: 8.2 FL (ref 6–12)
POTASSIUM SERPL-SCNC: 4.4 MMOL/L (ref 3.7–5.3)
RBC # BLD: 4.72 M/UL (ref 4.5–5.9)
RBC # BLD: ABNORMAL 10*6/UL
SEG NEUTROPHILS: 42 % (ref 36–66)
SEGMENTED NEUTROPHILS ABSOLUTE COUNT: 2 K/UL (ref 1.8–7.7)
SODIUM BLD-SCNC: 139 MMOL/L (ref 135–144)
TOTAL PROTEIN: 7.4 G/DL (ref 6.4–8.3)
WBC # BLD: 4.7 K/UL (ref 3.5–11)
WBC # BLD: ABNORMAL 10*3/UL

## 2017-03-05 PROCEDURE — 83690 ASSAY OF LIPASE: CPT

## 2017-03-05 PROCEDURE — 80053 COMPREHEN METABOLIC PANEL: CPT

## 2017-03-05 PROCEDURE — 2580000003 HC RX 258: Performed by: EMERGENCY MEDICINE

## 2017-03-05 PROCEDURE — 6360000002 HC RX W HCPCS: Performed by: EMERGENCY MEDICINE

## 2017-03-05 PROCEDURE — 99284 EMERGENCY DEPT VISIT MOD MDM: CPT

## 2017-03-05 PROCEDURE — 96374 THER/PROPH/DIAG INJ IV PUSH: CPT

## 2017-03-05 PROCEDURE — 85025 COMPLETE CBC W/AUTO DIFF WBC: CPT

## 2017-03-05 PROCEDURE — 96375 TX/PRO/DX INJ NEW DRUG ADDON: CPT

## 2017-03-05 RX ORDER — KETOROLAC TROMETHAMINE 30 MG/ML
15 INJECTION, SOLUTION INTRAMUSCULAR; INTRAVENOUS ONCE
Status: COMPLETED | OUTPATIENT
Start: 2017-03-05 | End: 2017-03-05

## 2017-03-05 RX ORDER — PROMETHAZINE HYDROCHLORIDE 25 MG/1
25 TABLET ORAL EVERY 6 HOURS PRN
Qty: 10 TABLET | Refills: 0 | Status: SHIPPED | OUTPATIENT
Start: 2017-03-05 | End: 2017-03-12

## 2017-03-05 RX ORDER — 0.9 % SODIUM CHLORIDE 0.9 %
1000 INTRAVENOUS SOLUTION INTRAVENOUS ONCE
Status: COMPLETED | OUTPATIENT
Start: 2017-03-05 | End: 2017-03-05

## 2017-03-05 RX ORDER — PROMETHAZINE HYDROCHLORIDE 25 MG/ML
12.5 INJECTION, SOLUTION INTRAMUSCULAR; INTRAVENOUS ONCE
Status: COMPLETED | OUTPATIENT
Start: 2017-03-05 | End: 2017-03-05

## 2017-03-05 RX ORDER — METOCLOPRAMIDE HYDROCHLORIDE 5 MG/ML
10 INJECTION INTRAMUSCULAR; INTRAVENOUS ONCE
Status: DISCONTINUED | OUTPATIENT
Start: 2017-03-05 | End: 2017-03-05

## 2017-03-05 RX ORDER — ONDANSETRON 2 MG/ML
4 INJECTION INTRAMUSCULAR; INTRAVENOUS ONCE
Status: COMPLETED | OUTPATIENT
Start: 2017-03-05 | End: 2017-03-05

## 2017-03-05 RX ORDER — DIPHENHYDRAMINE HYDROCHLORIDE 50 MG/ML
25 INJECTION INTRAMUSCULAR; INTRAVENOUS ONCE
Status: COMPLETED | OUTPATIENT
Start: 2017-03-05 | End: 2017-03-05

## 2017-03-05 RX ADMIN — SODIUM CHLORIDE 1000 ML: 9 INJECTION, SOLUTION INTRAVENOUS at 16:19

## 2017-03-05 RX ADMIN — PROMETHAZINE HYDROCHLORIDE 12.5 MG: 25 INJECTION, SOLUTION INTRAMUSCULAR; INTRAVENOUS at 18:30

## 2017-03-05 RX ADMIN — ONDANSETRON 4 MG: 2 INJECTION, SOLUTION INTRAMUSCULAR; INTRAVENOUS at 16:39

## 2017-03-05 RX ADMIN — KETOROLAC TROMETHAMINE 15 MG: 30 INJECTION, SOLUTION INTRAMUSCULAR at 18:58

## 2017-03-05 RX ADMIN — DIPHENHYDRAMINE HYDROCHLORIDE 25 MG: 50 INJECTION, SOLUTION INTRAMUSCULAR; INTRAVENOUS at 16:40

## 2017-03-05 RX ADMIN — KETOROLAC TROMETHAMINE 15 MG: 30 INJECTION, SOLUTION INTRAMUSCULAR at 16:39

## 2017-03-05 ASSESSMENT — PAIN DESCRIPTION - ORIENTATION: ORIENTATION: MID;RIGHT

## 2017-03-05 ASSESSMENT — PAIN SCALES - GENERAL: PAINLEVEL_OUTOF10: 9

## 2017-03-05 ASSESSMENT — PAIN DESCRIPTION - LOCATION: LOCATION: ABDOMEN

## 2017-03-05 ASSESSMENT — PAIN DESCRIPTION - PAIN TYPE: TYPE: ACUTE PAIN

## 2017-03-08 ENCOUNTER — APPOINTMENT (OUTPATIENT)
Dept: GENERAL RADIOLOGY | Age: 27
End: 2017-03-08
Payer: COMMERCIAL

## 2017-03-08 ENCOUNTER — HOSPITAL ENCOUNTER (EMERGENCY)
Age: 27
Discharge: HOME | End: 2017-03-08
Attending: EMERGENCY MEDICINE
Payer: COMMERCIAL

## 2017-03-08 VITALS
TEMPERATURE: 97.9 F | HEART RATE: 110 BPM | RESPIRATION RATE: 14 BRPM | WEIGHT: 240 LBS | DIASTOLIC BLOOD PRESSURE: 95 MMHG | OXYGEN SATURATION: 98 % | SYSTOLIC BLOOD PRESSURE: 152 MMHG | BODY MASS INDEX: 35.44 KG/M2

## 2017-03-08 DIAGNOSIS — L29.9 ITCHING: ICD-10-CM

## 2017-03-08 DIAGNOSIS — K59.00 CONSTIPATION, UNSPECIFIED CONSTIPATION TYPE: ICD-10-CM

## 2017-03-08 DIAGNOSIS — R10.13 EPIGASTRIC PAIN: Primary | ICD-10-CM

## 2017-03-08 LAB
ABSOLUTE EOS #: 0.2 K/UL (ref 0–0.4)
ABSOLUTE LYMPH #: 2.4 K/UL (ref 1–4.8)
ABSOLUTE MONO #: 0.5 K/UL (ref 0.1–1.2)
ALBUMIN SERPL-MCNC: 4.5 G/DL (ref 3.5–5.2)
ALBUMIN/GLOBULIN RATIO: 1.6 (ref 1–2.5)
ALP BLD-CCNC: 70 U/L (ref 40–129)
ALT SERPL-CCNC: 33 U/L (ref 5–41)
ANION GAP SERPL CALCULATED.3IONS-SCNC: 14 MMOL/L (ref 9–17)
AST SERPL-CCNC: 31 U/L
BASOPHILS # BLD: 0 % (ref 0–2)
BASOPHILS ABSOLUTE: 0 K/UL (ref 0–0.2)
BILIRUB SERPL-MCNC: 0.23 MG/DL (ref 0.3–1.2)
BILIRUBIN DIRECT: <0.08 MG/DL
BILIRUBIN, INDIRECT: ABNORMAL MG/DL (ref 0–1)
BUN BLDV-MCNC: 11 MG/DL (ref 6–20)
BUN/CREAT BLD: ABNORMAL (ref 9–20)
CALCIUM SERPL-MCNC: 9.1 MG/DL (ref 8.6–10.4)
CHLORIDE BLD-SCNC: 98 MMOL/L (ref 98–107)
CO2: 26 MMOL/L (ref 20–31)
CREAT SERPL-MCNC: 0.76 MG/DL (ref 0.7–1.2)
DIFFERENTIAL TYPE: ABNORMAL
EOSINOPHILS RELATIVE PERCENT: 4 % (ref 1–4)
GFR AFRICAN AMERICAN: >60 ML/MIN
GFR NON-AFRICAN AMERICAN: >60 ML/MIN
GFR SERPL CREATININE-BSD FRML MDRD: ABNORMAL ML/MIN/{1.73_M2}
GFR SERPL CREATININE-BSD FRML MDRD: ABNORMAL ML/MIN/{1.73_M2}
GLOBULIN: ABNORMAL G/DL (ref 1.5–3.8)
GLUCOSE BLD-MCNC: 101 MG/DL (ref 70–99)
HCT VFR BLD CALC: 40.4 % (ref 41–53)
HEMOGLOBIN: 14.1 G/DL (ref 13.5–17.5)
LIPASE: 25 U/L (ref 13–60)
LYMPHOCYTES # BLD: 44 % (ref 24–44)
MCH RBC QN AUTO: 29.4 PG (ref 26–34)
MCHC RBC AUTO-ENTMCNC: 34.9 G/DL (ref 31–37)
MCV RBC AUTO: 84.2 FL (ref 80–100)
MONOCYTES # BLD: 9 % (ref 2–11)
PDW BLD-RTO: 13.5 % (ref 12.5–15.4)
PLATELET # BLD: 252 K/UL (ref 140–450)
PLATELET ESTIMATE: ABNORMAL
PMV BLD AUTO: 8.2 FL (ref 6–12)
POTASSIUM SERPL-SCNC: 4 MMOL/L (ref 3.7–5.3)
RBC # BLD: 4.8 M/UL (ref 4.5–5.9)
RBC # BLD: ABNORMAL 10*6/UL
SEG NEUTROPHILS: 43 % (ref 36–66)
SEGMENTED NEUTROPHILS ABSOLUTE COUNT: 2.3 K/UL (ref 1.8–7.7)
SODIUM BLD-SCNC: 138 MMOL/L (ref 135–144)
TOTAL PROTEIN: 7.3 G/DL (ref 6.4–8.3)
WBC # BLD: 5.4 K/UL (ref 3.5–11)
WBC # BLD: ABNORMAL 10*3/UL

## 2017-03-08 PROCEDURE — 80048 BASIC METABOLIC PNL TOTAL CA: CPT

## 2017-03-08 PROCEDURE — 6370000000 HC RX 637 (ALT 250 FOR IP): Performed by: EMERGENCY MEDICINE

## 2017-03-08 PROCEDURE — 85025 COMPLETE CBC W/AUTO DIFF WBC: CPT

## 2017-03-08 PROCEDURE — S0028 INJECTION, FAMOTIDINE, 20 MG: HCPCS | Performed by: EMERGENCY MEDICINE

## 2017-03-08 PROCEDURE — 99284 EMERGENCY DEPT VISIT MOD MDM: CPT

## 2017-03-08 PROCEDURE — 96375 TX/PRO/DX INJ NEW DRUG ADDON: CPT

## 2017-03-08 PROCEDURE — 80076 HEPATIC FUNCTION PANEL: CPT

## 2017-03-08 PROCEDURE — 6360000002 HC RX W HCPCS: Performed by: EMERGENCY MEDICINE

## 2017-03-08 PROCEDURE — 74022 RADEX COMPL AQT ABD SERIES: CPT

## 2017-03-08 PROCEDURE — 83690 ASSAY OF LIPASE: CPT

## 2017-03-08 PROCEDURE — 2500000003 HC RX 250 WO HCPCS: Performed by: EMERGENCY MEDICINE

## 2017-03-08 PROCEDURE — 96374 THER/PROPH/DIAG INJ IV PUSH: CPT

## 2017-03-08 RX ORDER — DIPHENHYDRAMINE HYDROCHLORIDE 50 MG/ML
25 INJECTION INTRAMUSCULAR; INTRAVENOUS ONCE
Status: COMPLETED | OUTPATIENT
Start: 2017-03-08 | End: 2017-03-08

## 2017-03-08 RX ORDER — KETOROLAC TROMETHAMINE 30 MG/ML
30 INJECTION, SOLUTION INTRAMUSCULAR; INTRAVENOUS ONCE
Status: COMPLETED | OUTPATIENT
Start: 2017-03-08 | End: 2017-03-08

## 2017-03-08 RX ORDER — MAGNESIUM CARB/ALUMINUM HYDROX 105-160MG
296 TABLET,CHEWABLE ORAL ONCE
Qty: 3 BOTTLE | Refills: 0 | Status: SHIPPED | OUTPATIENT
Start: 2017-03-08 | End: 2017-03-08

## 2017-03-08 RX ORDER — METOCLOPRAMIDE 10 MG/1
10 TABLET ORAL 4 TIMES DAILY
Qty: 6 TABLET | Refills: 0 | Status: SHIPPED | OUTPATIENT
Start: 2017-03-08 | End: 2017-03-17

## 2017-03-08 RX ORDER — 0.9 % SODIUM CHLORIDE 0.9 %
10 VIAL (ML) INJECTION ONCE
Status: DISCONTINUED | OUTPATIENT
Start: 2017-03-08 | End: 2017-03-08 | Stop reason: HOSPADM

## 2017-03-08 RX ORDER — DIPHENHYDRAMINE HCL 25 MG
25 CAPSULE ORAL EVERY 4 HOURS PRN
Qty: 1 CAPSULE | Refills: 0 | Status: SHIPPED | OUTPATIENT
Start: 2017-03-08 | End: 2017-03-17

## 2017-03-08 RX ORDER — PANTOPRAZOLE SODIUM 20 MG/1
40 TABLET, DELAYED RELEASE ORAL DAILY
Qty: 30 TABLET | Refills: 0 | Status: SHIPPED | OUTPATIENT
Start: 2017-03-08 | End: 2017-05-01 | Stop reason: SDUPTHER

## 2017-03-08 RX ORDER — SODIUM PHOSPHATE, DIBASIC AND SODIUM PHOSPHATE, MONOBASIC 7; 19 G/133ML; G/133ML
1 ENEMA RECTAL
Qty: 2 BOTTLE | Refills: 0 | Status: SHIPPED | OUTPATIENT
Start: 2017-03-08 | End: 2017-03-08

## 2017-03-08 RX ORDER — PANTOPRAZOLE SODIUM 40 MG/10ML
40 INJECTION, POWDER, LYOPHILIZED, FOR SOLUTION INTRAVENOUS ONCE
Status: DISCONTINUED | OUTPATIENT
Start: 2017-03-08 | End: 2017-03-08 | Stop reason: HOSPADM

## 2017-03-08 RX ORDER — METOCLOPRAMIDE HYDROCHLORIDE 5 MG/ML
10 INJECTION INTRAMUSCULAR; INTRAVENOUS ONCE
Status: DISCONTINUED | OUTPATIENT
Start: 2017-03-08 | End: 2017-03-08 | Stop reason: HOSPADM

## 2017-03-08 RX ORDER — PROMETHAZINE HYDROCHLORIDE 25 MG/ML
25 INJECTION, SOLUTION INTRAMUSCULAR; INTRAVENOUS ONCE
Status: DISCONTINUED | OUTPATIENT
Start: 2017-03-08 | End: 2017-03-08 | Stop reason: HOSPADM

## 2017-03-08 RX ORDER — PROMETHAZINE HYDROCHLORIDE 25 MG/1
25 TABLET ORAL EVERY 6 HOURS PRN
Qty: 10 TABLET | Refills: 0 | Status: SHIPPED | OUTPATIENT
Start: 2017-03-08 | End: 2017-03-15

## 2017-03-08 RX ADMIN — KETOROLAC TROMETHAMINE 30 MG: 30 INJECTION, SOLUTION INTRAMUSCULAR; INTRAVENOUS at 18:41

## 2017-03-08 RX ADMIN — FAMOTIDINE 20 MG: 10 INJECTION, SOLUTION INTRAVENOUS at 18:41

## 2017-03-08 RX ADMIN — DIPHENHYDRAMINE HYDROCHLORIDE 25 MG: 50 INJECTION, SOLUTION INTRAMUSCULAR; INTRAVENOUS at 18:41

## 2017-03-08 RX ADMIN — CITROMA MAGNESIUM CITRATE 296 ML: 1.75 LIQUID ORAL at 19:23

## 2017-03-08 ASSESSMENT — ENCOUNTER SYMPTOMS
NAUSEA: 1
BACK PAIN: 0
ABDOMINAL PAIN: 1
BLOOD IN STOOL: 1
ABDOMINAL DISTENTION: 0
VOMITING: 1
SHORTNESS OF BREATH: 0

## 2017-03-08 ASSESSMENT — PAIN SCALES - GENERAL: PAINLEVEL_OUTOF10: 8

## 2017-03-08 ASSESSMENT — PAIN DESCRIPTION - LOCATION: LOCATION: ABDOMEN

## 2017-03-17 ENCOUNTER — APPOINTMENT (OUTPATIENT)
Dept: ULTRASOUND IMAGING | Age: 27
End: 2017-03-17
Payer: COMMERCIAL

## 2017-03-17 ENCOUNTER — HOSPITAL ENCOUNTER (OUTPATIENT)
Age: 27
Setting detail: OBSERVATION
Discharge: HOME | End: 2017-03-20
Attending: EMERGENCY MEDICINE | Admitting: EMERGENCY MEDICINE
Payer: COMMERCIAL

## 2017-03-17 DIAGNOSIS — R10.13 ABDOMINAL PAIN, EPIGASTRIC: Primary | ICD-10-CM

## 2017-03-17 LAB
ABSOLUTE EOS #: 0.1 K/UL (ref 0–0.4)
ABSOLUTE LYMPH #: 1.6 K/UL (ref 1–4.8)
ABSOLUTE MONO #: 0.4 K/UL (ref 0.1–1.2)
ALBUMIN SERPL-MCNC: 4.4 G/DL (ref 3.5–5.2)
ALBUMIN/GLOBULIN RATIO: 1.4 (ref 1–2.5)
ALP BLD-CCNC: 71 U/L (ref 40–129)
ALT SERPL-CCNC: 28 U/L (ref 5–41)
ANION GAP SERPL CALCULATED.3IONS-SCNC: 11 MMOL/L (ref 9–17)
AST SERPL-CCNC: 31 U/L
BASOPHILS # BLD: 1 % (ref 0–2)
BASOPHILS ABSOLUTE: 0 K/UL (ref 0–0.2)
BILIRUB SERPL-MCNC: 0.27 MG/DL (ref 0.3–1.2)
BILIRUBIN URINE: NEGATIVE
BUN BLDV-MCNC: 8 MG/DL (ref 6–20)
BUN/CREAT BLD: ABNORMAL (ref 9–20)
CALCIUM SERPL-MCNC: 9.5 MG/DL (ref 8.6–10.4)
CHLORIDE BLD-SCNC: 102 MMOL/L (ref 98–107)
CO2: 28 MMOL/L (ref 20–31)
COLOR: ABNORMAL
COMMENT UA: ABNORMAL
CREAT SERPL-MCNC: 0.97 MG/DL (ref 0.7–1.2)
DIFFERENTIAL TYPE: ABNORMAL
EOSINOPHILS RELATIVE PERCENT: 2 % (ref 1–4)
GFR AFRICAN AMERICAN: >60 ML/MIN
GFR NON-AFRICAN AMERICAN: >60 ML/MIN
GFR SERPL CREATININE-BSD FRML MDRD: ABNORMAL ML/MIN/{1.73_M2}
GFR SERPL CREATININE-BSD FRML MDRD: ABNORMAL ML/MIN/{1.73_M2}
GLUCOSE BLD-MCNC: 113 MG/DL (ref 70–99)
GLUCOSE URINE: NEGATIVE
HCT VFR BLD CALC: 39.9 % (ref 41–53)
HEMOGLOBIN: 13.7 G/DL (ref 13.5–17.5)
KETONES, URINE: NEGATIVE
LEUKOCYTE ESTERASE, URINE: NEGATIVE
LIPASE: 26 U/L (ref 13–60)
LYMPHOCYTES # BLD: 30 % (ref 24–44)
MCH RBC QN AUTO: 29.3 PG (ref 26–34)
MCHC RBC AUTO-ENTMCNC: 34.4 G/DL (ref 31–37)
MCV RBC AUTO: 85.3 FL (ref 80–100)
MONOCYTES # BLD: 7 % (ref 2–11)
NITRITE, URINE: NEGATIVE
PDW BLD-RTO: 13.3 % (ref 12.5–15.4)
PH UA: 5.5 (ref 5–8)
PLATELET # BLD: 263 K/UL (ref 140–450)
PLATELET ESTIMATE: ABNORMAL
PMV BLD AUTO: 8.2 FL (ref 6–12)
POTASSIUM SERPL-SCNC: 4.1 MMOL/L (ref 3.7–5.3)
PROTEIN UA: NEGATIVE
RBC # BLD: 4.69 M/UL (ref 4.5–5.9)
RBC # BLD: ABNORMAL 10*6/UL
SEG NEUTROPHILS: 60 % (ref 36–66)
SEGMENTED NEUTROPHILS ABSOLUTE COUNT: 3.3 K/UL (ref 1.8–7.7)
SODIUM BLD-SCNC: 141 MMOL/L (ref 135–144)
SPECIFIC GRAVITY UA: 1.03 (ref 1–1.03)
TOTAL PROTEIN: 7.6 G/DL (ref 6.4–8.3)
TURBIDITY: CLEAR
URINE HGB: NEGATIVE
UROBILINOGEN, URINE: NORMAL
WBC # BLD: 5.5 K/UL (ref 3.5–11)
WBC # BLD: ABNORMAL 10*3/UL

## 2017-03-17 PROCEDURE — 6360000002 HC RX W HCPCS: Performed by: EMERGENCY MEDICINE

## 2017-03-17 PROCEDURE — 6370000000 HC RX 637 (ALT 250 FOR IP): Performed by: EMERGENCY MEDICINE

## 2017-03-17 PROCEDURE — 94640 AIRWAY INHALATION TREATMENT: CPT

## 2017-03-17 PROCEDURE — 2580000003 HC RX 258: Performed by: EMERGENCY MEDICINE

## 2017-03-17 PROCEDURE — 85025 COMPLETE CBC W/AUTO DIFF WBC: CPT

## 2017-03-17 PROCEDURE — G0378 HOSPITAL OBSERVATION PER HR: HCPCS

## 2017-03-17 PROCEDURE — 2500000003 HC RX 250 WO HCPCS: Performed by: EMERGENCY MEDICINE

## 2017-03-17 PROCEDURE — 81003 URINALYSIS AUTO W/O SCOPE: CPT

## 2017-03-17 PROCEDURE — 83690 ASSAY OF LIPASE: CPT

## 2017-03-17 PROCEDURE — 96375 TX/PRO/DX INJ NEW DRUG ADDON: CPT

## 2017-03-17 PROCEDURE — 80053 COMPREHEN METABOLIC PANEL: CPT

## 2017-03-17 PROCEDURE — S0028 INJECTION, FAMOTIDINE, 20 MG: HCPCS | Performed by: EMERGENCY MEDICINE

## 2017-03-17 PROCEDURE — 99285 EMERGENCY DEPT VISIT HI MDM: CPT

## 2017-03-17 PROCEDURE — 76705 ECHO EXAM OF ABDOMEN: CPT

## 2017-03-17 PROCEDURE — 96374 THER/PROPH/DIAG INJ IV PUSH: CPT

## 2017-03-17 RX ORDER — MONTELUKAST SODIUM 10 MG/1
10 TABLET ORAL NIGHTLY
Status: DISCONTINUED | OUTPATIENT
Start: 2017-03-17 | End: 2017-03-20 | Stop reason: HOSPADM

## 2017-03-17 RX ORDER — MAGNESIUM HYDROXIDE/ALUMINUM HYDROXICE/SIMETHICONE 120; 1200; 1200 MG/30ML; MG/30ML; MG/30ML
30 SUSPENSION ORAL ONCE
Status: COMPLETED | OUTPATIENT
Start: 2017-03-17 | End: 2017-03-17

## 2017-03-17 RX ORDER — MORPHINE SULFATE 4 MG/ML
4 INJECTION, SOLUTION INTRAMUSCULAR; INTRAVENOUS
Status: DISCONTINUED | OUTPATIENT
Start: 2017-03-17 | End: 2017-03-17

## 2017-03-17 RX ORDER — FLUTICASONE FUROATE AND VILANTEROL 200; 25 UG/1; UG/1
1 POWDER RESPIRATORY (INHALATION) DAILY
Status: DISCONTINUED | OUTPATIENT
Start: 2017-03-17 | End: 2017-03-17

## 2017-03-17 RX ORDER — MORPHINE SULFATE 2 MG/ML
2 INJECTION, SOLUTION INTRAMUSCULAR; INTRAVENOUS EVERY 4 HOURS PRN
Status: DISCONTINUED | OUTPATIENT
Start: 2017-03-17 | End: 2017-03-19

## 2017-03-17 RX ORDER — MAGNESIUM HYDROXIDE/ALUMINUM HYDROXICE/SIMETHICONE 120; 1200; 1200 MG/30ML; MG/30ML; MG/30ML
30 SUSPENSION ORAL EVERY 6 HOURS PRN
Status: DISCONTINUED | OUTPATIENT
Start: 2017-03-17 | End: 2017-03-20 | Stop reason: HOSPADM

## 2017-03-17 RX ORDER — ACETAMINOPHEN 325 MG/1
650 TABLET ORAL EVERY 4 HOURS PRN
Status: DISCONTINUED | OUTPATIENT
Start: 2017-03-17 | End: 2017-03-20 | Stop reason: HOSPADM

## 2017-03-17 RX ORDER — ONDANSETRON 2 MG/ML
4 INJECTION INTRAMUSCULAR; INTRAVENOUS ONCE
Status: COMPLETED | OUTPATIENT
Start: 2017-03-17 | End: 2017-03-17

## 2017-03-17 RX ORDER — 0.9 % SODIUM CHLORIDE 0.9 %
1000 INTRAVENOUS SOLUTION INTRAVENOUS ONCE
Status: COMPLETED | OUTPATIENT
Start: 2017-03-17 | End: 2017-03-17

## 2017-03-17 RX ORDER — HYDROCODONE BITARTRATE AND ACETAMINOPHEN 5; 325 MG/1; MG/1
2 TABLET ORAL EVERY 4 HOURS PRN
Status: DISCONTINUED | OUTPATIENT
Start: 2017-03-17 | End: 2017-03-18

## 2017-03-17 RX ORDER — MELOXICAM 7.5 MG/1
15 TABLET ORAL DAILY
Status: DISCONTINUED | OUTPATIENT
Start: 2017-03-17 | End: 2017-03-20 | Stop reason: HOSPADM

## 2017-03-17 RX ORDER — HYDROCODONE BITARTRATE AND ACETAMINOPHEN 5; 325 MG/1; MG/1
1 TABLET ORAL EVERY 4 HOURS PRN
Status: DISCONTINUED | OUTPATIENT
Start: 2017-03-17 | End: 2017-03-18

## 2017-03-17 RX ORDER — PANTOPRAZOLE SODIUM 40 MG/1
40 TABLET, DELAYED RELEASE ORAL DAILY
Status: DISCONTINUED | OUTPATIENT
Start: 2017-03-17 | End: 2017-03-20 | Stop reason: HOSPADM

## 2017-03-17 RX ORDER — SODIUM CHLORIDE 0.9 % (FLUSH) 0.9 %
10 SYRINGE (ML) INJECTION PRN
Status: DISCONTINUED | OUTPATIENT
Start: 2017-03-17 | End: 2017-03-20 | Stop reason: HOSPADM

## 2017-03-17 RX ORDER — ONDANSETRON 2 MG/ML
4 INJECTION INTRAMUSCULAR; INTRAVENOUS EVERY 6 HOURS PRN
Status: DISCONTINUED | OUTPATIENT
Start: 2017-03-17 | End: 2017-03-20 | Stop reason: HOSPADM

## 2017-03-17 RX ORDER — MORPHINE SULFATE 2 MG/ML
2 INJECTION, SOLUTION INTRAMUSCULAR; INTRAVENOUS
Status: DISCONTINUED | OUTPATIENT
Start: 2017-03-17 | End: 2017-03-17

## 2017-03-17 RX ORDER — METOCLOPRAMIDE HYDROCHLORIDE 5 MG/ML
10 INJECTION INTRAMUSCULAR; INTRAVENOUS EVERY 6 HOURS PRN
Status: DISCONTINUED | OUTPATIENT
Start: 2017-03-17 | End: 2017-03-19 | Stop reason: ALTCHOICE

## 2017-03-17 RX ORDER — ALBUTEROL SULFATE 90 UG/1
2 AEROSOL, METERED RESPIRATORY (INHALATION) EVERY 6 HOURS PRN
Status: DISCONTINUED | OUTPATIENT
Start: 2017-03-17 | End: 2017-03-20 | Stop reason: HOSPADM

## 2017-03-17 RX ORDER — SODIUM CHLORIDE 0.9 % (FLUSH) 0.9 %
10 SYRINGE (ML) INJECTION EVERY 12 HOURS SCHEDULED
Status: DISCONTINUED | OUTPATIENT
Start: 2017-03-17 | End: 2017-03-20 | Stop reason: HOSPADM

## 2017-03-17 RX ORDER — DIPHENHYDRAMINE HYDROCHLORIDE 50 MG/ML
12.5 INJECTION INTRAMUSCULAR; INTRAVENOUS EVERY 6 HOURS PRN
Status: DISCONTINUED | OUTPATIENT
Start: 2017-03-17 | End: 2017-03-20 | Stop reason: HOSPADM

## 2017-03-17 RX ORDER — MORPHINE SULFATE 4 MG/ML
4 INJECTION, SOLUTION INTRAMUSCULAR; INTRAVENOUS EVERY 4 HOURS PRN
Status: DISCONTINUED | OUTPATIENT
Start: 2017-03-17 | End: 2017-03-19

## 2017-03-17 RX ADMIN — ONDANSETRON 4 MG: 2 INJECTION, SOLUTION INTRAMUSCULAR; INTRAVENOUS at 19:05

## 2017-03-17 RX ADMIN — LIDOCAINE HYDROCHLORIDE 15 ML: 20 SOLUTION ORAL; TOPICAL at 13:56

## 2017-03-17 RX ADMIN — HYDROCODONE BITARTRATE AND ACETAMINOPHEN 2 TABLET: 5; 325 TABLET ORAL at 20:22

## 2017-03-17 RX ADMIN — Medication 10 ML: at 22:32

## 2017-03-17 RX ADMIN — METOCLOPRAMIDE 10 MG: 5 INJECTION, SOLUTION INTRAMUSCULAR; INTRAVENOUS at 22:39

## 2017-03-17 RX ADMIN — ALUMINUM HYDROXIDE, MAGNESIUM HYDROXIDE, AND SIMETHICONE 30 ML: 200; 200; 20 SUSPENSION ORAL at 13:56

## 2017-03-17 RX ADMIN — MORPHINE SULFATE 4 MG: 4 INJECTION, SOLUTION INTRAMUSCULAR; INTRAVENOUS at 19:04

## 2017-03-17 RX ADMIN — SODIUM CHLORIDE 1000 ML: 9 INJECTION, SOLUTION INTRAVENOUS at 13:02

## 2017-03-17 RX ADMIN — ONDANSETRON 4 MG: 2 INJECTION, SOLUTION INTRAMUSCULAR; INTRAVENOUS at 13:02

## 2017-03-17 RX ADMIN — DIPHENHYDRAMINE HYDROCHLORIDE 12.5 MG: 50 INJECTION, SOLUTION INTRAMUSCULAR; INTRAVENOUS at 21:55

## 2017-03-17 RX ADMIN — FAMOTIDINE 20 MG: 10 INJECTION, SOLUTION INTRAVENOUS at 13:02

## 2017-03-17 ASSESSMENT — ENCOUNTER SYMPTOMS
CHEST TIGHTNESS: 0
WHEEZING: 0
APNEA: 0
EYE PAIN: 0
DIARRHEA: 0
SORE THROAT: 0
BACK PAIN: 0
TROUBLE SWALLOWING: 0
PHOTOPHOBIA: 0
SHORTNESS OF BREATH: 0
ABDOMINAL DISTENTION: 0
BLOOD IN STOOL: 0
COUGH: 0
EYE REDNESS: 0
VOMITING: 1
EYE DISCHARGE: 0
COLOR CHANGE: 0
VOICE CHANGE: 0
FACIAL SWELLING: 0
STRIDOR: 0
CHOKING: 0
CONSTIPATION: 0
ABDOMINAL PAIN: 1
NAUSEA: 1

## 2017-03-17 ASSESSMENT — PAIN DESCRIPTION - PAIN TYPE: TYPE: ACUTE PAIN

## 2017-03-17 ASSESSMENT — PAIN DESCRIPTION - LOCATION: LOCATION: ABDOMEN

## 2017-03-17 ASSESSMENT — PAIN DESCRIPTION - DESCRIPTORS: DESCRIPTORS: STABBING

## 2017-03-17 ASSESSMENT — PAIN SCALES - GENERAL
PAINLEVEL_OUTOF10: 8
PAINLEVEL_OUTOF10: 10
PAINLEVEL_OUTOF10: 9

## 2017-03-17 ASSESSMENT — PAIN DESCRIPTION - ORIENTATION: ORIENTATION: LEFT;LOWER

## 2017-03-17 ASSESSMENT — PAIN DESCRIPTION - FREQUENCY: FREQUENCY: INTERMITTENT

## 2017-03-18 PROCEDURE — 6370000000 HC RX 637 (ALT 250 FOR IP): Performed by: EMERGENCY MEDICINE

## 2017-03-18 PROCEDURE — 2580000003 HC RX 258: Performed by: EMERGENCY MEDICINE

## 2017-03-18 PROCEDURE — 6360000002 HC RX W HCPCS: Performed by: EMERGENCY MEDICINE

## 2017-03-18 PROCEDURE — G0378 HOSPITAL OBSERVATION PER HR: HCPCS

## 2017-03-18 PROCEDURE — 94660 CPAP INITIATION&MGMT: CPT

## 2017-03-18 PROCEDURE — 96376 TX/PRO/DX INJ SAME DRUG ADON: CPT

## 2017-03-18 RX ORDER — OXYCODONE HYDROCHLORIDE AND ACETAMINOPHEN 5; 325 MG/1; MG/1
1 TABLET ORAL EVERY 4 HOURS PRN
Status: DISCONTINUED | OUTPATIENT
Start: 2017-03-18 | End: 2017-03-20 | Stop reason: HOSPADM

## 2017-03-18 RX ORDER — POLYETHYLENE GLYCOL 3350 17 G/17G
238 POWDER, FOR SOLUTION ORAL ONCE
Status: DISCONTINUED | OUTPATIENT
Start: 2017-03-19 | End: 2017-03-19

## 2017-03-18 RX ORDER — OXYCODONE HYDROCHLORIDE AND ACETAMINOPHEN 5; 325 MG/1; MG/1
2 TABLET ORAL EVERY 4 HOURS PRN
Status: DISCONTINUED | OUTPATIENT
Start: 2017-03-18 | End: 2017-03-20 | Stop reason: HOSPADM

## 2017-03-18 RX ADMIN — Medication 10 ML: at 08:20

## 2017-03-18 RX ADMIN — Medication 10 ML: at 20:44

## 2017-03-18 RX ADMIN — MORPHINE SULFATE 4 MG: 4 INJECTION, SOLUTION INTRAMUSCULAR; INTRAVENOUS at 08:19

## 2017-03-18 RX ADMIN — DIPHENHYDRAMINE HYDROCHLORIDE 12.5 MG: 50 INJECTION, SOLUTION INTRAMUSCULAR; INTRAVENOUS at 03:59

## 2017-03-18 RX ADMIN — MORPHINE SULFATE 4 MG: 4 INJECTION, SOLUTION INTRAMUSCULAR; INTRAVENOUS at 22:23

## 2017-03-18 RX ADMIN — ONDANSETRON 4 MG: 2 INJECTION, SOLUTION INTRAMUSCULAR; INTRAVENOUS at 17:54

## 2017-03-18 RX ADMIN — DIPHENHYDRAMINE HYDROCHLORIDE 12.5 MG: 50 INJECTION, SOLUTION INTRAMUSCULAR; INTRAVENOUS at 16:19

## 2017-03-18 RX ADMIN — MORPHINE SULFATE 2 MG: 2 INJECTION, SOLUTION INTRAMUSCULAR; INTRAVENOUS at 00:30

## 2017-03-18 RX ADMIN — ONDANSETRON 4 MG: 2 INJECTION, SOLUTION INTRAMUSCULAR; INTRAVENOUS at 08:19

## 2017-03-18 RX ADMIN — DIPHENHYDRAMINE HYDROCHLORIDE 12.5 MG: 50 INJECTION, SOLUTION INTRAMUSCULAR; INTRAVENOUS at 12:16

## 2017-03-18 RX ADMIN — ONDANSETRON 4 MG: 2 INJECTION, SOLUTION INTRAMUSCULAR; INTRAVENOUS at 22:25

## 2017-03-18 RX ADMIN — HYDROCODONE BITARTRATE AND ACETAMINOPHEN 1 TABLET: 5; 325 TABLET ORAL at 16:20

## 2017-03-18 RX ADMIN — MORPHINE SULFATE 4 MG: 4 INJECTION, SOLUTION INTRAMUSCULAR; INTRAVENOUS at 17:54

## 2017-03-18 RX ADMIN — DIPHENHYDRAMINE HYDROCHLORIDE 12.5 MG: 50 INJECTION, SOLUTION INTRAMUSCULAR; INTRAVENOUS at 20:43

## 2017-03-18 RX ADMIN — ACETAMINOPHEN 650 MG: 325 TABLET ORAL at 01:53

## 2017-03-18 RX ADMIN — DIPHENHYDRAMINE HYDROCHLORIDE 12.5 MG: 50 INJECTION, SOLUTION INTRAMUSCULAR; INTRAVENOUS at 08:19

## 2017-03-18 RX ADMIN — Medication 10 ML: at 17:55

## 2017-03-18 RX ADMIN — ONDANSETRON 4 MG: 2 INJECTION, SOLUTION INTRAMUSCULAR; INTRAVENOUS at 01:38

## 2017-03-18 RX ADMIN — OXYCODONE HYDROCHLORIDE AND ACETAMINOPHEN 2 TABLET: 5; 325 TABLET ORAL at 20:42

## 2017-03-18 RX ADMIN — MORPHINE SULFATE 4 MG: 4 INJECTION, SOLUTION INTRAMUSCULAR; INTRAVENOUS at 12:16

## 2017-03-18 ASSESSMENT — PAIN SCALES - GENERAL
PAINLEVEL_OUTOF10: 9
PAINLEVEL_OUTOF10: 7
PAINLEVEL_OUTOF10: 7
PAINLEVEL_OUTOF10: 6
PAINLEVEL_OUTOF10: 7
PAINLEVEL_OUTOF10: 8
PAINLEVEL_OUTOF10: 8
PAINLEVEL_OUTOF10: 7
PAINLEVEL_OUTOF10: 9
PAINLEVEL_OUTOF10: 7
PAINLEVEL_OUTOF10: 5

## 2017-03-18 ASSESSMENT — PAIN DESCRIPTION - PROGRESSION
CLINICAL_PROGRESSION: NOT CHANGED
CLINICAL_PROGRESSION: GRADUALLY WORSENING
CLINICAL_PROGRESSION: GRADUALLY WORSENING
CLINICAL_PROGRESSION: NOT CHANGED
CLINICAL_PROGRESSION: GRADUALLY IMPROVING
CLINICAL_PROGRESSION: NOT CHANGED

## 2017-03-18 ASSESSMENT — PAIN DESCRIPTION - PAIN TYPE: TYPE: ACUTE PAIN

## 2017-03-18 ASSESSMENT — PAIN DESCRIPTION - LOCATION: LOCATION: ABDOMEN

## 2017-03-18 ASSESSMENT — PAIN DESCRIPTION - ORIENTATION: ORIENTATION: LEFT;RIGHT;LOWER

## 2017-03-18 ASSESSMENT — PAIN DESCRIPTION - FREQUENCY: FREQUENCY: CONTINUOUS

## 2017-03-18 ASSESSMENT — PAIN DESCRIPTION - DESCRIPTORS: DESCRIPTORS: CONSTANT;SHARP

## 2017-03-19 PROCEDURE — 96376 TX/PRO/DX INJ SAME DRUG ADON: CPT

## 2017-03-19 PROCEDURE — G0378 HOSPITAL OBSERVATION PER HR: HCPCS

## 2017-03-19 PROCEDURE — 6360000002 HC RX W HCPCS: Performed by: EMERGENCY MEDICINE

## 2017-03-19 PROCEDURE — 6370000000 HC RX 637 (ALT 250 FOR IP): Performed by: INTERNAL MEDICINE

## 2017-03-19 PROCEDURE — 6370000000 HC RX 637 (ALT 250 FOR IP): Performed by: EMERGENCY MEDICINE

## 2017-03-19 PROCEDURE — 2580000003 HC RX 258: Performed by: EMERGENCY MEDICINE

## 2017-03-19 RX ORDER — PROMETHAZINE HYDROCHLORIDE 25 MG/ML
12.5 INJECTION, SOLUTION INTRAMUSCULAR; INTRAVENOUS EVERY 6 HOURS PRN
Status: DISCONTINUED | OUTPATIENT
Start: 2017-03-19 | End: 2017-03-20 | Stop reason: HOSPADM

## 2017-03-19 RX ORDER — GABAPENTIN 400 MG/1
400 CAPSULE ORAL 2 TIMES DAILY
Status: DISCONTINUED | OUTPATIENT
Start: 2017-03-19 | End: 2017-03-20 | Stop reason: HOSPADM

## 2017-03-19 RX ORDER — POLYETHYLENE GLYCOL 3350 17 G/17G
255 POWDER, FOR SOLUTION ORAL ONCE
Status: COMPLETED | OUTPATIENT
Start: 2017-03-19 | End: 2017-03-19

## 2017-03-19 RX ORDER — DOCUSATE SODIUM 100 MG/1
100 CAPSULE, LIQUID FILLED ORAL 2 TIMES DAILY
Status: DISCONTINUED | OUTPATIENT
Start: 2017-03-19 | End: 2017-03-20 | Stop reason: HOSPADM

## 2017-03-19 RX ORDER — GABAPENTIN 400 MG/1
400 CAPSULE ORAL 2 TIMES DAILY
COMMUNITY
End: 2018-03-21 | Stop reason: ALTCHOICE

## 2017-03-19 RX ADMIN — PROMETHAZINE HYDROCHLORIDE 12.5 MG: 25 INJECTION, SOLUTION INTRAMUSCULAR; INTRAVENOUS at 08:25

## 2017-03-19 RX ADMIN — OXYCODONE HYDROCHLORIDE AND ACETAMINOPHEN 2 TABLET: 5; 325 TABLET ORAL at 00:44

## 2017-03-19 RX ADMIN — OXYCODONE HYDROCHLORIDE AND ACETAMINOPHEN 2 TABLET: 5; 325 TABLET ORAL at 04:52

## 2017-03-19 RX ADMIN — MAGNESIUM HYDROXIDE 30 ML: 400 SUSPENSION ORAL at 14:25

## 2017-03-19 RX ADMIN — DIPHENHYDRAMINE HYDROCHLORIDE 12.5 MG: 50 INJECTION, SOLUTION INTRAMUSCULAR; INTRAVENOUS at 02:03

## 2017-03-19 RX ADMIN — DIPHENHYDRAMINE HYDROCHLORIDE 12.5 MG: 50 INJECTION, SOLUTION INTRAMUSCULAR; INTRAVENOUS at 22:17

## 2017-03-19 RX ADMIN — DIPHENHYDRAMINE HYDROCHLORIDE 12.5 MG: 50 INJECTION, SOLUTION INTRAMUSCULAR; INTRAVENOUS at 08:25

## 2017-03-19 RX ADMIN — GABAPENTIN 400 MG: 400 CAPSULE ORAL at 08:25

## 2017-03-19 RX ADMIN — MORPHINE SULFATE 4 MG: 4 INJECTION, SOLUTION INTRAMUSCULAR; INTRAVENOUS at 02:03

## 2017-03-19 RX ADMIN — DIPHENHYDRAMINE HYDROCHLORIDE 12.5 MG: 50 INJECTION, SOLUTION INTRAMUSCULAR; INTRAVENOUS at 14:14

## 2017-03-19 RX ADMIN — OXYCODONE HYDROCHLORIDE AND ACETAMINOPHEN 2 TABLET: 5; 325 TABLET ORAL at 14:13

## 2017-03-19 RX ADMIN — PROMETHAZINE HYDROCHLORIDE 12.5 MG: 25 INJECTION, SOLUTION INTRAMUSCULAR; INTRAVENOUS at 18:21

## 2017-03-19 RX ADMIN — ONDANSETRON 4 MG: 2 INJECTION, SOLUTION INTRAMUSCULAR; INTRAVENOUS at 04:52

## 2017-03-19 RX ADMIN — DIPHENHYDRAMINE HYDROCHLORIDE 12.5 MG: 50 INJECTION, SOLUTION INTRAMUSCULAR; INTRAVENOUS at 18:21

## 2017-03-19 RX ADMIN — PANTOPRAZOLE SODIUM 40 MG: 40 TABLET, DELAYED RELEASE ORAL at 08:42

## 2017-03-19 RX ADMIN — POLYETHYLENE GLYCOL 3350 255 G: 17 POWDER, FOR SOLUTION ORAL at 14:18

## 2017-03-19 RX ADMIN — OXYCODONE HYDROCHLORIDE AND ACETAMINOPHEN 2 TABLET: 5; 325 TABLET ORAL at 18:21

## 2017-03-19 RX ADMIN — OXYCODONE HYDROCHLORIDE AND ACETAMINOPHEN 2 TABLET: 5; 325 TABLET ORAL at 08:25

## 2017-03-19 RX ADMIN — ONDANSETRON 4 MG: 2 INJECTION, SOLUTION INTRAMUSCULAR; INTRAVENOUS at 14:14

## 2017-03-19 RX ADMIN — GABAPENTIN 400 MG: 400 CAPSULE ORAL at 20:19

## 2017-03-19 RX ADMIN — Medication 10 ML: at 14:14

## 2017-03-19 RX ADMIN — DOCUSATE SODIUM 100 MG: 100 CAPSULE ORAL at 14:14

## 2017-03-19 RX ADMIN — MORPHINE SULFATE 4 MG: 4 INJECTION, SOLUTION INTRAMUSCULAR; INTRAVENOUS at 06:33

## 2017-03-19 RX ADMIN — OXYCODONE HYDROCHLORIDE AND ACETAMINOPHEN 2 TABLET: 5; 325 TABLET ORAL at 22:17

## 2017-03-19 ASSESSMENT — PAIN SCALES - GENERAL
PAINLEVEL_OUTOF10: 0
PAINLEVEL_OUTOF10: 8
PAINLEVEL_OUTOF10: 7
PAINLEVEL_OUTOF10: 8
PAINLEVEL_OUTOF10: 9
PAINLEVEL_OUTOF10: 7
PAINLEVEL_OUTOF10: 7
PAINLEVEL_OUTOF10: 8
PAINLEVEL_OUTOF10: 7
PAINLEVEL_OUTOF10: 0
PAINLEVEL_OUTOF10: 9
PAINLEVEL_OUTOF10: 9
PAINLEVEL_OUTOF10: 8
PAINLEVEL_OUTOF10: 10
PAINLEVEL_OUTOF10: 8
PAINLEVEL_OUTOF10: 9
PAINLEVEL_OUTOF10: 8
PAINLEVEL_OUTOF10: 9
PAINLEVEL_OUTOF10: 9
PAINLEVEL_OUTOF10: 7
PAINLEVEL_OUTOF10: 8
PAINLEVEL_OUTOF10: 7
PAINLEVEL_OUTOF10: 9
PAINLEVEL_OUTOF10: 8
PAINLEVEL_OUTOF10: 8

## 2017-03-19 ASSESSMENT — PAIN DESCRIPTION - PROGRESSION
CLINICAL_PROGRESSION: NOT CHANGED
CLINICAL_PROGRESSION: NOT CHANGED
CLINICAL_PROGRESSION: GRADUALLY WORSENING
CLINICAL_PROGRESSION: NOT CHANGED
CLINICAL_PROGRESSION: GRADUALLY IMPROVING
CLINICAL_PROGRESSION: NOT CHANGED
CLINICAL_PROGRESSION: GRADUALLY IMPROVING
CLINICAL_PROGRESSION: NOT CHANGED
CLINICAL_PROGRESSION: GRADUALLY IMPROVING
CLINICAL_PROGRESSION: NOT CHANGED
CLINICAL_PROGRESSION: NOT CHANGED
CLINICAL_PROGRESSION: GRADUALLY WORSENING
CLINICAL_PROGRESSION: NOT CHANGED
CLINICAL_PROGRESSION: NOT CHANGED
CLINICAL_PROGRESSION: GRADUALLY WORSENING
CLINICAL_PROGRESSION: GRADUALLY IMPROVING
CLINICAL_PROGRESSION: GRADUALLY WORSENING
CLINICAL_PROGRESSION: GRADUALLY WORSENING
CLINICAL_PROGRESSION: NOT CHANGED

## 2017-03-19 ASSESSMENT — PAIN DESCRIPTION - LOCATION: LOCATION: ABDOMEN

## 2017-03-19 ASSESSMENT — PAIN DESCRIPTION - DESCRIPTORS: DESCRIPTORS: CONSTANT;SHARP

## 2017-03-19 ASSESSMENT — PAIN DESCRIPTION - ORIENTATION: ORIENTATION: LEFT;LOWER

## 2017-03-19 ASSESSMENT — PAIN DESCRIPTION - PAIN TYPE
TYPE: ACUTE PAIN
TYPE: ACUTE PAIN

## 2017-03-19 ASSESSMENT — PAIN DESCRIPTION - FREQUENCY: FREQUENCY: CONTINUOUS

## 2017-03-20 ENCOUNTER — ANESTHESIA EVENT (OUTPATIENT)
Dept: ENDOSCOPY | Age: 27
End: 2017-03-20
Payer: COMMERCIAL

## 2017-03-20 ENCOUNTER — ANESTHESIA (OUTPATIENT)
Dept: ENDOSCOPY | Age: 27
End: 2017-03-20
Payer: COMMERCIAL

## 2017-03-20 VITALS
OXYGEN SATURATION: 98 % | HEART RATE: 70 BPM | WEIGHT: 220.4 LBS | BODY MASS INDEX: 33.4 KG/M2 | SYSTOLIC BLOOD PRESSURE: 123 MMHG | RESPIRATION RATE: 12 BRPM | TEMPERATURE: 98.9 F | HEIGHT: 68 IN | DIASTOLIC BLOOD PRESSURE: 70 MMHG

## 2017-03-20 VITALS
DIASTOLIC BLOOD PRESSURE: 50 MMHG | RESPIRATION RATE: 18 BRPM | SYSTOLIC BLOOD PRESSURE: 102 MMHG | OXYGEN SATURATION: 100 %

## 2017-03-20 LAB
C DIFFICILE TOXINS, PCR: NORMAL
DATE, STOOL #1: NORMAL
DATE, STOOL #2: NORMAL
DATE, STOOL #3: NORMAL
HEMOCCULT SP1 STL QL: NEGATIVE
HEMOCCULT SP2 STL QL: NORMAL
HEMOCCULT SP3 STL QL: NORMAL
SPECIMEN DESCRIPTION: NORMAL
TIME, STOOL #1: NORMAL
TIME, STOOL #2: NORMAL
TIME, STOOL #3: NORMAL

## 2017-03-20 PROCEDURE — 3609008400 HC SIGMOIDOSCOPY DIAGNOSTIC: Performed by: INTERNAL MEDICINE

## 2017-03-20 PROCEDURE — 3700000001 HC ADD 15 MINUTES (ANESTHESIA): Performed by: INTERNAL MEDICINE

## 2017-03-20 PROCEDURE — 88305 TISSUE EXAM BY PATHOLOGIST: CPT

## 2017-03-20 PROCEDURE — 96376 TX/PRO/DX INJ SAME DRUG ADON: CPT

## 2017-03-20 PROCEDURE — 2580000003 HC RX 258: Performed by: NURSE ANESTHETIST, CERTIFIED REGISTERED

## 2017-03-20 PROCEDURE — 3609012400 HC EGD TRANSORAL BIOPSY SINGLE/MULTIPLE: Performed by: INTERNAL MEDICINE

## 2017-03-20 PROCEDURE — 3700000000 HC ANESTHESIA ATTENDED CARE: Performed by: INTERNAL MEDICINE

## 2017-03-20 PROCEDURE — 2500000003 HC RX 250 WO HCPCS: Performed by: NURSE ANESTHETIST, CERTIFIED REGISTERED

## 2017-03-20 PROCEDURE — 76937 US GUIDE VASCULAR ACCESS: CPT

## 2017-03-20 PROCEDURE — 99406 BEHAV CHNG SMOKING 3-10 MIN: CPT

## 2017-03-20 PROCEDURE — G0328 FECAL BLOOD SCRN IMMUNOASSAY: HCPCS

## 2017-03-20 PROCEDURE — 7100000011 HC PHASE II RECOVERY - ADDTL 15 MIN: Performed by: INTERNAL MEDICINE

## 2017-03-20 PROCEDURE — 6360000002 HC RX W HCPCS: Performed by: NURSE ANESTHETIST, CERTIFIED REGISTERED

## 2017-03-20 PROCEDURE — 7100000010 HC PHASE II RECOVERY - FIRST 15 MIN: Performed by: INTERNAL MEDICINE

## 2017-03-20 PROCEDURE — 88342 IMHCHEM/IMCYTCHM 1ST ANTB: CPT

## 2017-03-20 PROCEDURE — 6360000002 HC RX W HCPCS: Performed by: EMERGENCY MEDICINE

## 2017-03-20 PROCEDURE — G0378 HOSPITAL OBSERVATION PER HR: HCPCS

## 2017-03-20 PROCEDURE — 87493 C DIFF AMPLIFIED PROBE: CPT

## 2017-03-20 PROCEDURE — 6370000000 HC RX 637 (ALT 250 FOR IP): Performed by: EMERGENCY MEDICINE

## 2017-03-20 RX ORDER — PROPOFOL 10 MG/ML
INJECTION, EMULSION INTRAVENOUS PRN
Status: DISCONTINUED | OUTPATIENT
Start: 2017-03-20 | End: 2017-03-20 | Stop reason: SDUPTHER

## 2017-03-20 RX ORDER — SODIUM CHLORIDE 9 MG/ML
INJECTION, SOLUTION INTRAVENOUS CONTINUOUS PRN
Status: DISCONTINUED | OUTPATIENT
Start: 2017-03-20 | End: 2017-03-20 | Stop reason: SDUPTHER

## 2017-03-20 RX ORDER — PANTOPRAZOLE SODIUM 40 MG/1
40 TABLET, DELAYED RELEASE ORAL DAILY
Qty: 30 TABLET | Refills: 3 | Status: SHIPPED | OUTPATIENT
Start: 2017-03-20 | End: 2017-05-01 | Stop reason: SDUPTHER

## 2017-03-20 RX ORDER — LIDOCAINE HYDROCHLORIDE 10 MG/ML
INJECTION, SOLUTION INFILTRATION; PERINEURAL PRN
Status: DISCONTINUED | OUTPATIENT
Start: 2017-03-20 | End: 2017-03-20 | Stop reason: SDUPTHER

## 2017-03-20 RX ORDER — PROMETHAZINE HYDROCHLORIDE 25 MG/1
25 SUPPOSITORY RECTAL EVERY 6 HOURS PRN
Qty: 20 SUPPOSITORY | Refills: 0 | Status: SHIPPED | OUTPATIENT
Start: 2017-03-20 | End: 2017-03-27

## 2017-03-20 RX ADMIN — PROPOFOL 50 MG: 10 INJECTION, EMULSION INTRAVENOUS at 14:57

## 2017-03-20 RX ADMIN — OXYCODONE HYDROCHLORIDE AND ACETAMINOPHEN 2 TABLET: 5; 325 TABLET ORAL at 06:32

## 2017-03-20 RX ADMIN — PROPOFOL 20 MG: 10 INJECTION, EMULSION INTRAVENOUS at 15:01

## 2017-03-20 RX ADMIN — PROPOFOL 20 MG: 10 INJECTION, EMULSION INTRAVENOUS at 15:07

## 2017-03-20 RX ADMIN — PROPOFOL 30 MG: 10 INJECTION, EMULSION INTRAVENOUS at 15:02

## 2017-03-20 RX ADMIN — PROPOFOL 50 MG: 10 INJECTION, EMULSION INTRAVENOUS at 15:00

## 2017-03-20 RX ADMIN — SODIUM CHLORIDE: 9 INJECTION, SOLUTION INTRAVENOUS at 14:50

## 2017-03-20 RX ADMIN — PROMETHAZINE HYDROCHLORIDE 12.5 MG: 25 INJECTION, SOLUTION INTRAMUSCULAR; INTRAVENOUS at 06:32

## 2017-03-20 RX ADMIN — LIDOCAINE HYDROCHLORIDE 100 MG: 10 INJECTION, SOLUTION INFILTRATION; PERINEURAL at 14:57

## 2017-03-20 RX ADMIN — PROPOFOL 20 MG: 10 INJECTION, EMULSION INTRAVENOUS at 15:04

## 2017-03-20 RX ADMIN — OXYCODONE HYDROCHLORIDE AND ACETAMINOPHEN 2 TABLET: 5; 325 TABLET ORAL at 10:47

## 2017-03-20 RX ADMIN — DIPHENHYDRAMINE HYDROCHLORIDE 12.5 MG: 50 INJECTION, SOLUTION INTRAMUSCULAR; INTRAVENOUS at 10:11

## 2017-03-20 RX ADMIN — PROPOFOL 50 MG: 10 INJECTION, EMULSION INTRAVENOUS at 14:58

## 2017-03-20 RX ADMIN — DIPHENHYDRAMINE HYDROCHLORIDE 12.5 MG: 50 INJECTION, SOLUTION INTRAMUSCULAR; INTRAVENOUS at 02:17

## 2017-03-20 RX ADMIN — OXYCODONE HYDROCHLORIDE AND ACETAMINOPHEN 2 TABLET: 5; 325 TABLET ORAL at 02:17

## 2017-03-20 RX ADMIN — PROPOFOL 50 MG: 10 INJECTION, EMULSION INTRAVENOUS at 14:59

## 2017-03-20 RX ADMIN — PROPOFOL 10 MG: 10 INJECTION, EMULSION INTRAVENOUS at 15:03

## 2017-03-20 RX ADMIN — PROPOFOL 20 MG: 10 INJECTION, EMULSION INTRAVENOUS at 15:10

## 2017-03-20 RX ADMIN — PROMETHAZINE HYDROCHLORIDE 12.5 MG: 25 INJECTION, SOLUTION INTRAMUSCULAR; INTRAVENOUS at 00:19

## 2017-03-20 ASSESSMENT — PAIN DESCRIPTION - LOCATION
LOCATION: ABDOMEN

## 2017-03-20 ASSESSMENT — PAIN SCALES - GENERAL
PAINLEVEL_OUTOF10: 9
PAINLEVEL_OUTOF10: 9
PAINLEVEL_OUTOF10: 8
PAINLEVEL_OUTOF10: 9
PAINLEVEL_OUTOF10: 8
PAINLEVEL_OUTOF10: 7
PAINLEVEL_OUTOF10: 8
PAINLEVEL_OUTOF10: 9
PAINLEVEL_OUTOF10: 9
PAINLEVEL_OUTOF10: 8
PAINLEVEL_OUTOF10: 9
PAINLEVEL_OUTOF10: 7
PAINLEVEL_OUTOF10: 8

## 2017-03-20 ASSESSMENT — PAIN DESCRIPTION - PROGRESSION
CLINICAL_PROGRESSION: GRADUALLY WORSENING
CLINICAL_PROGRESSION: GRADUALLY IMPROVING
CLINICAL_PROGRESSION: NOT CHANGED
CLINICAL_PROGRESSION: GRADUALLY IMPROVING
CLINICAL_PROGRESSION: NOT CHANGED
CLINICAL_PROGRESSION: NOT CHANGED
CLINICAL_PROGRESSION: GRADUALLY WORSENING
CLINICAL_PROGRESSION: NOT CHANGED

## 2017-03-20 ASSESSMENT — ENCOUNTER SYMPTOMS
STRIDOR: 0
SHORTNESS OF BREATH: 0

## 2017-03-20 ASSESSMENT — PAIN DESCRIPTION - DESCRIPTORS
DESCRIPTORS: SHARP
DESCRIPTORS: CRAMPING
DESCRIPTORS: SHARP
DESCRIPTORS: SHARP
DESCRIPTORS: STABBING;CONSTANT

## 2017-03-20 ASSESSMENT — PAIN DESCRIPTION - PAIN TYPE
TYPE: ACUTE PAIN

## 2017-03-20 ASSESSMENT — PAIN DESCRIPTION - ORIENTATION
ORIENTATION: LEFT;MID
ORIENTATION: LEFT

## 2017-03-20 ASSESSMENT — COPD QUESTIONNAIRES: CAT_SEVERITY: NO INTERVAL CHANGE

## 2017-03-20 ASSESSMENT — PAIN DESCRIPTION - ONSET: ONSET: ON-GOING

## 2017-03-20 ASSESSMENT — PAIN - FUNCTIONAL ASSESSMENT: PAIN_FUNCTIONAL_ASSESSMENT: 0-10

## 2017-03-20 ASSESSMENT — PAIN DESCRIPTION - FREQUENCY: FREQUENCY: CONTINUOUS

## 2017-03-22 LAB — SURGICAL PATHOLOGY REPORT: NORMAL

## 2017-03-31 ENCOUNTER — APPOINTMENT (OUTPATIENT)
Dept: GENERAL RADIOLOGY | Age: 27
End: 2017-03-31
Payer: COMMERCIAL

## 2017-03-31 ENCOUNTER — HOSPITAL ENCOUNTER (EMERGENCY)
Age: 27
Discharge: HOME | End: 2017-03-31
Attending: EMERGENCY MEDICINE
Payer: COMMERCIAL

## 2017-03-31 VITALS
TEMPERATURE: 97.2 F | RESPIRATION RATE: 18 BRPM | SYSTOLIC BLOOD PRESSURE: 154 MMHG | HEART RATE: 102 BPM | HEIGHT: 69 IN | OXYGEN SATURATION: 98 % | DIASTOLIC BLOOD PRESSURE: 91 MMHG

## 2017-03-31 DIAGNOSIS — L25.9 CONTACT DERMATITIS, UNSPECIFIED CONTACT DERMATITIS TYPE, UNSPECIFIED TRIGGER: ICD-10-CM

## 2017-03-31 DIAGNOSIS — R10.9 ABDOMINAL DISCOMFORT: Primary | ICD-10-CM

## 2017-03-31 LAB
ABSOLUTE EOS #: 0.1 K/UL (ref 0–0.4)
ABSOLUTE LYMPH #: 2.6 K/UL (ref 1–4.8)
ABSOLUTE MONO #: 0.4 K/UL (ref 0.1–1.2)
ALBUMIN SERPL-MCNC: 4.1 G/DL (ref 3.5–5.2)
ALBUMIN/GLOBULIN RATIO: 1.2 (ref 1–2.5)
ALP BLD-CCNC: 62 U/L (ref 40–129)
ALT SERPL-CCNC: 19 U/L (ref 5–41)
ANION GAP SERPL CALCULATED.3IONS-SCNC: 12 MMOL/L (ref 9–17)
AST SERPL-CCNC: 22 U/L
BASOPHILS # BLD: 0 % (ref 0–2)
BASOPHILS ABSOLUTE: 0 K/UL (ref 0–0.2)
BILIRUB SERPL-MCNC: 0.28 MG/DL (ref 0.3–1.2)
BUN BLDV-MCNC: 10 MG/DL (ref 6–20)
BUN/CREAT BLD: ABNORMAL (ref 9–20)
CALCIUM SERPL-MCNC: 9 MG/DL (ref 8.6–10.4)
CHLORIDE BLD-SCNC: 94 MMOL/L (ref 98–107)
CO2: 26 MMOL/L (ref 20–31)
CREAT SERPL-MCNC: 0.84 MG/DL (ref 0.7–1.2)
DIFFERENTIAL TYPE: ABNORMAL
DIRECT EXAM: NORMAL
EOSINOPHILS RELATIVE PERCENT: 2 % (ref 1–4)
GFR AFRICAN AMERICAN: >60 ML/MIN
GFR NON-AFRICAN AMERICAN: >60 ML/MIN
GFR SERPL CREATININE-BSD FRML MDRD: ABNORMAL ML/MIN/{1.73_M2}
GFR SERPL CREATININE-BSD FRML MDRD: ABNORMAL ML/MIN/{1.73_M2}
GLUCOSE BLD-MCNC: 119 MG/DL (ref 70–99)
HCT VFR BLD CALC: 40.8 % (ref 41–53)
HEMOGLOBIN: 13.9 G/DL (ref 13.5–17.5)
LIPASE: 23 U/L (ref 13–60)
LYMPHOCYTES # BLD: 39 % (ref 24–44)
Lab: NORMAL
Lab: NORMAL
MCH RBC QN AUTO: 29.1 PG (ref 26–34)
MCHC RBC AUTO-ENTMCNC: 34.1 G/DL (ref 31–37)
MCV RBC AUTO: 85.4 FL (ref 80–100)
MONOCYTES # BLD: 6 % (ref 2–11)
PDW BLD-RTO: 13.3 % (ref 12.5–15.4)
PLATELET # BLD: 249 K/UL (ref 140–450)
PLATELET ESTIMATE: ABNORMAL
PMV BLD AUTO: 7.9 FL (ref 6–12)
POTASSIUM SERPL-SCNC: 3.5 MMOL/L (ref 3.7–5.3)
RBC # BLD: 4.77 M/UL (ref 4.5–5.9)
RBC # BLD: ABNORMAL 10*6/UL
SEG NEUTROPHILS: 53 % (ref 36–66)
SEGMENTED NEUTROPHILS ABSOLUTE COUNT: 3.5 K/UL (ref 1.8–7.7)
SODIUM BLD-SCNC: 132 MMOL/L (ref 135–144)
SPECIMEN DESCRIPTION: NORMAL
SPECIMEN DESCRIPTION: NORMAL
STATUS: NORMAL
STATUS: NORMAL
TOTAL PROTEIN: 7.4 G/DL (ref 6.4–8.3)
WBC # BLD: 6.7 K/UL (ref 3.5–11)
WBC # BLD: ABNORMAL 10*3/UL

## 2017-03-31 PROCEDURE — 87651 STREP A DNA AMP PROBE: CPT

## 2017-03-31 PROCEDURE — 96375 TX/PRO/DX INJ NEW DRUG ADDON: CPT

## 2017-03-31 PROCEDURE — 85025 COMPLETE CBC W/AUTO DIFF WBC: CPT

## 2017-03-31 PROCEDURE — 96374 THER/PROPH/DIAG INJ IV PUSH: CPT

## 2017-03-31 PROCEDURE — 83690 ASSAY OF LIPASE: CPT

## 2017-03-31 PROCEDURE — 6370000000 HC RX 637 (ALT 250 FOR IP): Performed by: EMERGENCY MEDICINE

## 2017-03-31 PROCEDURE — S0028 INJECTION, FAMOTIDINE, 20 MG: HCPCS | Performed by: EMERGENCY MEDICINE

## 2017-03-31 PROCEDURE — 71101 X-RAY EXAM UNILAT RIBS/CHEST: CPT

## 2017-03-31 PROCEDURE — 99284 EMERGENCY DEPT VISIT MOD MDM: CPT

## 2017-03-31 PROCEDURE — 2500000003 HC RX 250 WO HCPCS: Performed by: EMERGENCY MEDICINE

## 2017-03-31 PROCEDURE — 87880 STREP A ASSAY W/OPTIC: CPT

## 2017-03-31 PROCEDURE — 6360000002 HC RX W HCPCS: Performed by: EMERGENCY MEDICINE

## 2017-03-31 PROCEDURE — 80053 COMPREHEN METABOLIC PANEL: CPT

## 2017-03-31 PROCEDURE — 2580000003 HC RX 258: Performed by: EMERGENCY MEDICINE

## 2017-03-31 RX ORDER — 0.9 % SODIUM CHLORIDE 0.9 %
1000 INTRAVENOUS SOLUTION INTRAVENOUS ONCE
Status: COMPLETED | OUTPATIENT
Start: 2017-03-31 | End: 2017-03-31

## 2017-03-31 RX ORDER — MAGNESIUM HYDROXIDE/ALUMINUM HYDROXICE/SIMETHICONE 120; 1200; 1200 MG/30ML; MG/30ML; MG/30ML
30 SUSPENSION ORAL
Status: COMPLETED | OUTPATIENT
Start: 2017-03-31 | End: 2017-03-31

## 2017-03-31 RX ORDER — DIAPER,BRIEF,INFANT-TODD,DISP
EACH MISCELLANEOUS
Qty: 1 TUBE | Refills: 1 | Status: SHIPPED | OUTPATIENT
Start: 2017-03-31 | End: 2017-04-07

## 2017-03-31 RX ORDER — DIPHENHYDRAMINE HYDROCHLORIDE 50 MG/ML
25 INJECTION INTRAMUSCULAR; INTRAVENOUS ONCE
Status: COMPLETED | OUTPATIENT
Start: 2017-03-31 | End: 2017-03-31

## 2017-03-31 RX ADMIN — SODIUM CHLORIDE 1000 ML: 9 INJECTION, SOLUTION INTRAVENOUS at 09:33

## 2017-03-31 RX ADMIN — DIPHENHYDRAMINE HYDROCHLORIDE 25 MG: 50 INJECTION, SOLUTION INTRAMUSCULAR; INTRAVENOUS at 09:57

## 2017-03-31 RX ADMIN — LIDOCAINE HYDROCHLORIDE 15 ML: 20 SOLUTION ORAL; TOPICAL at 09:33

## 2017-03-31 RX ADMIN — ALUMINUM HYDROXIDE, MAGNESIUM HYDROXIDE, AND SIMETHICONE 30 ML: 200; 200; 20 SUSPENSION ORAL at 09:33

## 2017-03-31 RX ADMIN — FAMOTIDINE 20 MG: 10 INJECTION, SOLUTION INTRAVENOUS at 09:33

## 2017-03-31 ASSESSMENT — ENCOUNTER SYMPTOMS
ABDOMINAL PAIN: 1
EYE REDNESS: 0
FACIAL SWELLING: 0
WHEEZING: 0
BLOOD IN STOOL: 0
CHOKING: 0
TROUBLE SWALLOWING: 0
SHORTNESS OF BREATH: 0
COUGH: 0
BACK PAIN: 0
APNEA: 0
NAUSEA: 1
CHEST TIGHTNESS: 0
COLOR CHANGE: 0
CONSTIPATION: 0
STRIDOR: 0
ABDOMINAL DISTENTION: 0
VOICE CHANGE: 0
DIARRHEA: 0
SORE THROAT: 0
PHOTOPHOBIA: 0
EYE DISCHARGE: 0
VOMITING: 1
EYE PAIN: 0

## 2017-04-06 ENCOUNTER — TELEPHONE (OUTPATIENT)
Dept: GASTROENTEROLOGY | Age: 27
End: 2017-04-06

## 2017-04-17 ENCOUNTER — HOSPITAL ENCOUNTER (EMERGENCY)
Age: 27
Discharge: HOME | End: 2017-04-17
Attending: EMERGENCY MEDICINE
Payer: COMMERCIAL

## 2017-04-17 VITALS
HEIGHT: 69 IN | WEIGHT: 150 LBS | RESPIRATION RATE: 14 BRPM | OXYGEN SATURATION: 95 % | BODY MASS INDEX: 22.22 KG/M2 | SYSTOLIC BLOOD PRESSURE: 144 MMHG | HEART RATE: 78 BPM | DIASTOLIC BLOOD PRESSURE: 68 MMHG | TEMPERATURE: 98.2 F

## 2017-04-17 DIAGNOSIS — R21 RASH AND OTHER NONSPECIFIC SKIN ERUPTION: ICD-10-CM

## 2017-04-17 DIAGNOSIS — R10.9 CHRONIC ABDOMINAL PAIN: Primary | ICD-10-CM

## 2017-04-17 DIAGNOSIS — G89.29 CHRONIC ABDOMINAL PAIN: Primary | ICD-10-CM

## 2017-04-17 LAB
ABSOLUTE EOS #: 0.1 K/UL (ref 0–0.4)
ABSOLUTE LYMPH #: 2.3 K/UL (ref 1–4.8)
ABSOLUTE MONO #: 0.6 K/UL (ref 0.1–1.2)
ALBUMIN SERPL-MCNC: 3.8 G/DL (ref 3.5–5.2)
ALBUMIN/GLOBULIN RATIO: 1.3 (ref 1–2.5)
ALP BLD-CCNC: 58 U/L (ref 40–129)
ALT SERPL-CCNC: 25 U/L (ref 5–41)
ANION GAP SERPL CALCULATED.3IONS-SCNC: 10 MMOL/L (ref 9–17)
AST SERPL-CCNC: 31 U/L
BASOPHILS # BLD: 0 % (ref 0–2)
BASOPHILS ABSOLUTE: 0 K/UL (ref 0–0.2)
BILIRUB SERPL-MCNC: 0.22 MG/DL (ref 0.3–1.2)
BUN BLDV-MCNC: 7 MG/DL (ref 6–20)
BUN/CREAT BLD: ABNORMAL (ref 9–20)
CALCIUM SERPL-MCNC: 8.6 MG/DL (ref 8.6–10.4)
CHLORIDE BLD-SCNC: 103 MMOL/L (ref 98–107)
CO2: 28 MMOL/L (ref 20–31)
CREAT SERPL-MCNC: 0.8 MG/DL (ref 0.7–1.2)
DIFFERENTIAL TYPE: ABNORMAL
EOSINOPHILS RELATIVE PERCENT: 2 % (ref 1–4)
GFR AFRICAN AMERICAN: >60 ML/MIN
GFR NON-AFRICAN AMERICAN: >60 ML/MIN
GFR SERPL CREATININE-BSD FRML MDRD: ABNORMAL ML/MIN/{1.73_M2}
GFR SERPL CREATININE-BSD FRML MDRD: ABNORMAL ML/MIN/{1.73_M2}
GLUCOSE BLD-MCNC: 99 MG/DL (ref 70–99)
HCT VFR BLD CALC: 36 % (ref 41–53)
HEMOGLOBIN: 12.4 G/DL (ref 13.5–17.5)
LIPASE: 24 U/L (ref 13–60)
LYMPHOCYTES # BLD: 46 % (ref 24–44)
MCH RBC QN AUTO: 29.4 PG (ref 26–34)
MCHC RBC AUTO-ENTMCNC: 34.4 G/DL (ref 31–37)
MCV RBC AUTO: 85.4 FL (ref 80–100)
MONOCYTES # BLD: 11 % (ref 2–11)
PDW BLD-RTO: 13.6 % (ref 12.5–15.4)
PLATELET # BLD: 232 K/UL (ref 140–450)
PLATELET ESTIMATE: ABNORMAL
PMV BLD AUTO: 8.2 FL (ref 6–12)
POTASSIUM SERPL-SCNC: 4.4 MMOL/L (ref 3.7–5.3)
RBC # BLD: 4.21 M/UL (ref 4.5–5.9)
RBC # BLD: ABNORMAL 10*6/UL
SEG NEUTROPHILS: 41 % (ref 36–66)
SEGMENTED NEUTROPHILS ABSOLUTE COUNT: 2.1 K/UL (ref 1.8–7.7)
SODIUM BLD-SCNC: 141 MMOL/L (ref 135–144)
TOTAL PROTEIN: 6.7 G/DL (ref 6.4–8.3)
WBC # BLD: 5 K/UL (ref 3.5–11)
WBC # BLD: ABNORMAL 10*3/UL

## 2017-04-17 PROCEDURE — 85025 COMPLETE CBC W/AUTO DIFF WBC: CPT

## 2017-04-17 PROCEDURE — 6360000002 HC RX W HCPCS: Performed by: EMERGENCY MEDICINE

## 2017-04-17 PROCEDURE — 2580000003 HC RX 258: Performed by: EMERGENCY MEDICINE

## 2017-04-17 PROCEDURE — 96375 TX/PRO/DX INJ NEW DRUG ADDON: CPT

## 2017-04-17 PROCEDURE — 99284 EMERGENCY DEPT VISIT MOD MDM: CPT

## 2017-04-17 PROCEDURE — 96374 THER/PROPH/DIAG INJ IV PUSH: CPT

## 2017-04-17 PROCEDURE — 6370000000 HC RX 637 (ALT 250 FOR IP): Performed by: EMERGENCY MEDICINE

## 2017-04-17 PROCEDURE — 83690 ASSAY OF LIPASE: CPT

## 2017-04-17 PROCEDURE — 80053 COMPREHEN METABOLIC PANEL: CPT

## 2017-04-17 RX ORDER — ONDANSETRON 4 MG/1
4 TABLET, FILM COATED ORAL EVERY 8 HOURS PRN
Qty: 20 TABLET | Refills: 0 | Status: SHIPPED | OUTPATIENT
Start: 2017-04-17 | End: 2017-05-01 | Stop reason: SDUPTHER

## 2017-04-17 RX ORDER — 0.9 % SODIUM CHLORIDE 0.9 %
1000 INTRAVENOUS SOLUTION INTRAVENOUS ONCE
Status: COMPLETED | OUTPATIENT
Start: 2017-04-17 | End: 2017-04-17

## 2017-04-17 RX ORDER — MAGNESIUM HYDROXIDE/ALUMINUM HYDROXICE/SIMETHICONE 120; 1200; 1200 MG/30ML; MG/30ML; MG/30ML
30 SUSPENSION ORAL
Status: COMPLETED | OUTPATIENT
Start: 2017-04-17 | End: 2017-04-17

## 2017-04-17 RX ORDER — PROMETHAZINE HYDROCHLORIDE 25 MG/1
25 TABLET ORAL EVERY 6 HOURS PRN
Qty: 10 TABLET | Refills: 0 | Status: SHIPPED | OUTPATIENT
Start: 2017-04-17 | End: 2017-04-29

## 2017-04-17 RX ORDER — FAMOTIDINE 20 MG/1
20 TABLET, FILM COATED ORAL 2 TIMES DAILY
Qty: 60 TABLET | Refills: 0 | Status: SHIPPED | OUTPATIENT
Start: 2017-04-17 | End: 2017-08-07

## 2017-04-17 RX ORDER — KETOROLAC TROMETHAMINE 30 MG/ML
15 INJECTION, SOLUTION INTRAMUSCULAR; INTRAVENOUS ONCE
Status: COMPLETED | OUTPATIENT
Start: 2017-04-17 | End: 2017-04-17

## 2017-04-17 RX ORDER — ONDANSETRON 2 MG/ML
4 INJECTION INTRAMUSCULAR; INTRAVENOUS ONCE
Status: COMPLETED | OUTPATIENT
Start: 2017-04-17 | End: 2017-04-17

## 2017-04-17 RX ORDER — DIPHENHYDRAMINE HYDROCHLORIDE 50 MG/ML
25 INJECTION INTRAMUSCULAR; INTRAVENOUS ONCE
Status: COMPLETED | OUTPATIENT
Start: 2017-04-17 | End: 2017-04-17

## 2017-04-17 RX ADMIN — KETOROLAC TROMETHAMINE 15 MG: 30 INJECTION, SOLUTION INTRAMUSCULAR; INTRAVENOUS at 12:44

## 2017-04-17 RX ADMIN — DIPHENHYDRAMINE HYDROCHLORIDE 25 MG: 50 INJECTION, SOLUTION INTRAMUSCULAR; INTRAVENOUS at 12:43

## 2017-04-17 RX ADMIN — ONDANSETRON 4 MG: 2 INJECTION, SOLUTION INTRAMUSCULAR; INTRAVENOUS at 12:25

## 2017-04-17 RX ADMIN — SODIUM CHLORIDE 1000 ML: 9 INJECTION, SOLUTION INTRAVENOUS at 12:23

## 2017-04-17 RX ADMIN — ALUMINUM HYDROXIDE, MAGNESIUM HYDROXIDE, AND SIMETHICONE 30 ML: 200; 200; 20 SUSPENSION ORAL at 12:25

## 2017-04-17 ASSESSMENT — ENCOUNTER SYMPTOMS
TROUBLE SWALLOWING: 0
COLOR CHANGE: 0
BLOOD IN STOOL: 0
VOMITING: 0
CONSTIPATION: 0
FACIAL SWELLING: 0
CHOKING: 0
EYE REDNESS: 0
SORE THROAT: 0
COUGH: 0
PHOTOPHOBIA: 0
ABDOMINAL PAIN: 1
NAUSEA: 0
SHORTNESS OF BREATH: 0
STRIDOR: 0
BACK PAIN: 0
EYE DISCHARGE: 0
CHEST TIGHTNESS: 0
EYE PAIN: 0
WHEEZING: 0
ABDOMINAL DISTENTION: 0
DIARRHEA: 0
APNEA: 0
VOICE CHANGE: 0

## 2017-04-17 ASSESSMENT — PAIN DESCRIPTION - DESCRIPTORS
DESCRIPTORS: CONSTANT
DESCRIPTORS: THROBBING;ACHING

## 2017-04-17 ASSESSMENT — PAIN DESCRIPTION - PAIN TYPE
TYPE: CHRONIC PAIN
TYPE: CHRONIC PAIN

## 2017-04-17 ASSESSMENT — PAIN DESCRIPTION - ORIENTATION
ORIENTATION: LEFT
ORIENTATION: LEFT

## 2017-04-17 ASSESSMENT — PAIN DESCRIPTION - LOCATION
LOCATION: RIB CAGE
LOCATION: ABDOMEN

## 2017-04-17 ASSESSMENT — PAIN DESCRIPTION - FREQUENCY
FREQUENCY: CONTINUOUS
FREQUENCY: CONTINUOUS

## 2017-04-17 ASSESSMENT — PAIN SCALES - GENERAL
PAINLEVEL_OUTOF10: 9
PAINLEVEL_OUTOF10: 8

## 2017-04-24 ENCOUNTER — HOSPITAL ENCOUNTER (EMERGENCY)
Age: 27
Discharge: HOME | End: 2017-04-24
Attending: EMERGENCY MEDICINE
Payer: COMMERCIAL

## 2017-04-24 VITALS
BODY MASS INDEX: 26.58 KG/M2 | WEIGHT: 180 LBS | OXYGEN SATURATION: 100 % | HEART RATE: 90 BPM | RESPIRATION RATE: 18 BRPM | DIASTOLIC BLOOD PRESSURE: 86 MMHG | SYSTOLIC BLOOD PRESSURE: 116 MMHG | TEMPERATURE: 97.2 F

## 2017-04-24 DIAGNOSIS — K59.1 FUNCTIONAL DIARRHEA: ICD-10-CM

## 2017-04-24 DIAGNOSIS — R10.84 CHRONIC GENERALIZED ABDOMINAL PAIN: Primary | ICD-10-CM

## 2017-04-24 DIAGNOSIS — G89.29 CHRONIC GENERALIZED ABDOMINAL PAIN: Primary | ICD-10-CM

## 2017-04-24 LAB
ABSOLUTE EOS #: 0.1 K/UL (ref 0–0.4)
ABSOLUTE LYMPH #: 2 K/UL (ref 1–4.8)
ABSOLUTE MONO #: 0.6 K/UL (ref 0.1–1.2)
ALBUMIN SERPL-MCNC: 4.1 G/DL (ref 3.5–5.2)
ALBUMIN/GLOBULIN RATIO: 1.1 (ref 1–2.5)
ALP BLD-CCNC: 71 U/L (ref 40–129)
ALT SERPL-CCNC: 42 U/L (ref 5–41)
ANION GAP SERPL CALCULATED.3IONS-SCNC: 13 MMOL/L (ref 9–17)
AST SERPL-CCNC: 45 U/L
BASOPHILS # BLD: 0 %
BASOPHILS ABSOLUTE: 0 K/UL (ref 0–0.2)
BILIRUB SERPL-MCNC: 0.32 MG/DL (ref 0.3–1.2)
BUN BLDV-MCNC: 11 MG/DL (ref 6–20)
BUN/CREAT BLD: ABNORMAL (ref 9–20)
CALCIUM SERPL-MCNC: 9.5 MG/DL (ref 8.6–10.4)
CHLORIDE BLD-SCNC: 100 MMOL/L (ref 98–107)
CO2: 26 MMOL/L (ref 20–31)
CREAT SERPL-MCNC: 0.88 MG/DL (ref 0.7–1.2)
DIFFERENTIAL TYPE: NORMAL
EOSINOPHILS RELATIVE PERCENT: 2 %
GFR AFRICAN AMERICAN: >60 ML/MIN
GFR NON-AFRICAN AMERICAN: >60 ML/MIN
GFR SERPL CREATININE-BSD FRML MDRD: ABNORMAL ML/MIN/{1.73_M2}
GFR SERPL CREATININE-BSD FRML MDRD: ABNORMAL ML/MIN/{1.73_M2}
GLUCOSE BLD-MCNC: 124 MG/DL (ref 70–99)
HCT VFR BLD CALC: 42.9 % (ref 41–53)
HEMOGLOBIN: 14.6 G/DL (ref 13.5–17.5)
LIPASE: 25 U/L (ref 13–60)
LYMPHOCYTES # BLD: 37 %
MCH RBC QN AUTO: 29.2 PG (ref 26–34)
MCHC RBC AUTO-ENTMCNC: 34.1 G/DL (ref 31–37)
MCV RBC AUTO: 85.5 FL (ref 80–100)
MONOCYTES # BLD: 10 %
PDW BLD-RTO: 13.6 % (ref 12.5–15.4)
PLATELET # BLD: 304 K/UL (ref 140–450)
PLATELET ESTIMATE: NORMAL
PMV BLD AUTO: 8.4 FL (ref 6–12)
POTASSIUM SERPL-SCNC: 4.2 MMOL/L (ref 3.7–5.3)
RBC # BLD: 5.02 M/UL (ref 4.5–5.9)
RBC # BLD: NORMAL 10*6/UL
SEG NEUTROPHILS: 51 %
SEGMENTED NEUTROPHILS ABSOLUTE COUNT: 2.7 K/UL (ref 1.8–7.7)
SODIUM BLD-SCNC: 139 MMOL/L (ref 135–144)
TOTAL PROTEIN: 7.9 G/DL (ref 6.4–8.3)
WBC # BLD: 5.3 K/UL (ref 3.5–11)
WBC # BLD: NORMAL 10*3/UL

## 2017-04-24 PROCEDURE — 83690 ASSAY OF LIPASE: CPT

## 2017-04-24 PROCEDURE — 6360000002 HC RX W HCPCS: Performed by: STUDENT IN AN ORGANIZED HEALTH CARE EDUCATION/TRAINING PROGRAM

## 2017-04-24 PROCEDURE — 2580000003 HC RX 258: Performed by: STUDENT IN AN ORGANIZED HEALTH CARE EDUCATION/TRAINING PROGRAM

## 2017-04-24 PROCEDURE — 96375 TX/PRO/DX INJ NEW DRUG ADDON: CPT

## 2017-04-24 PROCEDURE — 85025 COMPLETE CBC W/AUTO DIFF WBC: CPT

## 2017-04-24 PROCEDURE — 6370000000 HC RX 637 (ALT 250 FOR IP): Performed by: STUDENT IN AN ORGANIZED HEALTH CARE EDUCATION/TRAINING PROGRAM

## 2017-04-24 PROCEDURE — 96374 THER/PROPH/DIAG INJ IV PUSH: CPT

## 2017-04-24 PROCEDURE — 80053 COMPREHEN METABOLIC PANEL: CPT

## 2017-04-24 PROCEDURE — 99284 EMERGENCY DEPT VISIT MOD MDM: CPT

## 2017-04-24 PROCEDURE — 96361 HYDRATE IV INFUSION ADD-ON: CPT

## 2017-04-24 RX ORDER — DIPHENHYDRAMINE HYDROCHLORIDE 50 MG/ML
12.5 INJECTION INTRAMUSCULAR; INTRAVENOUS ONCE
Status: COMPLETED | OUTPATIENT
Start: 2017-04-24 | End: 2017-04-24

## 2017-04-24 RX ORDER — DIPHENHYDRAMINE HCL 25 MG
25 TABLET ORAL ONCE
Status: COMPLETED | OUTPATIENT
Start: 2017-04-24 | End: 2017-04-24

## 2017-04-24 RX ORDER — ACETAMINOPHEN 325 MG/1
325 TABLET ORAL EVERY 6 HOURS PRN
Qty: 20 TABLET | Refills: 0 | Status: SHIPPED | OUTPATIENT
Start: 2017-04-24 | End: 2017-05-01 | Stop reason: SDUPTHER

## 2017-04-24 RX ORDER — DIPHENHYDRAMINE HCL 25 MG
25 CAPSULE ORAL NIGHTLY PRN
Qty: 12 CAPSULE | Refills: 0 | Status: SHIPPED | OUTPATIENT
Start: 2017-04-24 | End: 2017-04-29

## 2017-04-24 RX ORDER — 0.9 % SODIUM CHLORIDE 0.9 %
1000 INTRAVENOUS SOLUTION INTRAVENOUS ONCE
Status: COMPLETED | OUTPATIENT
Start: 2017-04-24 | End: 2017-04-24

## 2017-04-24 RX ORDER — ONDANSETRON 2 MG/ML
4 INJECTION INTRAMUSCULAR; INTRAVENOUS ONCE
Status: COMPLETED | OUTPATIENT
Start: 2017-04-24 | End: 2017-04-24

## 2017-04-24 RX ORDER — WATER / MINERAL OIL / WHITE PETROLATUM 16 OZ
CREAM TOPICAL
Qty: 1 PACKAGE | Refills: 0 | Status: ON HOLD | OUTPATIENT
Start: 2017-04-24 | End: 2018-04-10 | Stop reason: ALTCHOICE

## 2017-04-24 RX ORDER — GABAPENTIN 600 MG/1
300 TABLET ORAL 3 TIMES DAILY
Status: DISCONTINUED | OUTPATIENT
Start: 2017-04-24 | End: 2017-04-24 | Stop reason: HOSPADM

## 2017-04-24 RX ORDER — PROMETHAZINE HYDROCHLORIDE 25 MG/1
25 SUPPOSITORY RECTAL EVERY 6 HOURS PRN
Qty: 8 SUPPOSITORY | Refills: 0 | Status: SHIPPED | OUTPATIENT
Start: 2017-04-24 | End: 2017-04-29

## 2017-04-24 RX ADMIN — ONDANSETRON 4 MG: 2 INJECTION, SOLUTION INTRAMUSCULAR; INTRAVENOUS at 10:55

## 2017-04-24 RX ADMIN — DIPHENHYDRAMINE HYDROCHLORIDE 12.5 MG: 50 INJECTION, SOLUTION INTRAMUSCULAR; INTRAVENOUS at 11:32

## 2017-04-24 RX ADMIN — SODIUM CHLORIDE 1000 ML: 9 INJECTION, SOLUTION INTRAVENOUS at 10:55

## 2017-04-24 RX ADMIN — GABAPENTIN 300 MG: 600 TABLET ORAL at 11:50

## 2017-04-24 RX ADMIN — DIPHENHYDRAMINE HCL 25 MG: 25 TABLET ORAL at 10:55

## 2017-04-24 ASSESSMENT — ENCOUNTER SYMPTOMS
WHEEZING: 0
ABDOMINAL DISTENTION: 0
BLOOD IN STOOL: 1
VOMITING: 1
DIARRHEA: 1
COUGH: 0
SHORTNESS OF BREATH: 0
NAUSEA: 1
PHOTOPHOBIA: 0
BACK PAIN: 0
ABDOMINAL PAIN: 1
SORE THROAT: 0
RHINORRHEA: 0

## 2017-04-24 ASSESSMENT — PAIN SCALES - GENERAL: PAINLEVEL_OUTOF10: 5

## 2017-04-24 ASSESSMENT — PAIN DESCRIPTION - PAIN TYPE: TYPE: CHRONIC PAIN

## 2017-04-24 ASSESSMENT — PAIN DESCRIPTION - LOCATION: LOCATION: ABDOMEN

## 2017-04-29 ENCOUNTER — HOSPITAL ENCOUNTER (EMERGENCY)
Age: 27
Discharge: HOME | End: 2017-04-29
Attending: EMERGENCY MEDICINE
Payer: COMMERCIAL

## 2017-04-29 VITALS
OXYGEN SATURATION: 94 % | HEIGHT: 65 IN | BODY MASS INDEX: 34.99 KG/M2 | WEIGHT: 210 LBS | DIASTOLIC BLOOD PRESSURE: 83 MMHG | HEART RATE: 86 BPM | TEMPERATURE: 98.4 F | SYSTOLIC BLOOD PRESSURE: 133 MMHG | RESPIRATION RATE: 16 BRPM

## 2017-04-29 DIAGNOSIS — R21 RASH AND OTHER NONSPECIFIC SKIN ERUPTION: Primary | ICD-10-CM

## 2017-04-29 DIAGNOSIS — R10.13 ABDOMINAL PAIN, EPIGASTRIC: ICD-10-CM

## 2017-04-29 PROCEDURE — 96375 TX/PRO/DX INJ NEW DRUG ADDON: CPT

## 2017-04-29 PROCEDURE — 99284 EMERGENCY DEPT VISIT MOD MDM: CPT

## 2017-04-29 PROCEDURE — 96374 THER/PROPH/DIAG INJ IV PUSH: CPT

## 2017-04-29 PROCEDURE — 6370000000 HC RX 637 (ALT 250 FOR IP): Performed by: EMERGENCY MEDICINE

## 2017-04-29 PROCEDURE — 96372 THER/PROPH/DIAG INJ SC/IM: CPT

## 2017-04-29 PROCEDURE — 6360000002 HC RX W HCPCS: Performed by: EMERGENCY MEDICINE

## 2017-04-29 RX ORDER — PROMETHAZINE HYDROCHLORIDE 25 MG/ML
25 INJECTION, SOLUTION INTRAMUSCULAR; INTRAVENOUS ONCE
Status: COMPLETED | OUTPATIENT
Start: 2017-04-29 | End: 2017-04-29

## 2017-04-29 RX ORDER — PROMETHAZINE HYDROCHLORIDE 25 MG/1
25 TABLET ORAL EVERY 6 HOURS PRN
Qty: 10 TABLET | Refills: 0 | Status: SHIPPED | OUTPATIENT
Start: 2017-04-29 | End: 2017-05-01 | Stop reason: ALTCHOICE

## 2017-04-29 RX ORDER — DIPHENHYDRAMINE HYDROCHLORIDE 50 MG/ML
25 INJECTION INTRAMUSCULAR; INTRAVENOUS EVERY 6 HOURS PRN
Status: DISCONTINUED | OUTPATIENT
Start: 2017-04-29 | End: 2017-04-29 | Stop reason: HOSPADM

## 2017-04-29 RX ORDER — HYDROXYZINE HYDROCHLORIDE 25 MG/1
25 TABLET, FILM COATED ORAL EVERY 6 HOURS PRN
Qty: 20 TABLET | Refills: 0 | Status: SHIPPED | OUTPATIENT
Start: 2017-04-29 | End: 2017-05-09

## 2017-04-29 RX ORDER — METOCLOPRAMIDE HYDROCHLORIDE 5 MG/ML
10 INJECTION INTRAMUSCULAR; INTRAVENOUS ONCE
Status: DISCONTINUED | OUTPATIENT
Start: 2017-04-29 | End: 2017-04-29

## 2017-04-29 RX ORDER — DIPHENHYDRAMINE HCL 25 MG
25 TABLET ORAL ONCE
Status: COMPLETED | OUTPATIENT
Start: 2017-04-29 | End: 2017-04-29

## 2017-04-29 RX ORDER — PROMETHAZINE HYDROCHLORIDE 25 MG/ML
12.5 INJECTION, SOLUTION INTRAMUSCULAR; INTRAVENOUS ONCE
Status: COMPLETED | OUTPATIENT
Start: 2017-04-29 | End: 2017-04-29

## 2017-04-29 RX ADMIN — DIPHENHYDRAMINE HYDROCHLORIDE 25 MG: 50 INJECTION, SOLUTION INTRAMUSCULAR; INTRAVENOUS at 13:45

## 2017-04-29 RX ADMIN — PROMETHAZINE HYDROCHLORIDE 12.5 MG: 25 INJECTION, SOLUTION INTRAMUSCULAR; INTRAVENOUS at 13:47

## 2017-04-29 RX ADMIN — DIPHENHYDRAMINE HCL 25 MG: 25 TABLET ORAL at 11:17

## 2017-04-29 RX ADMIN — PROMETHAZINE HYDROCHLORIDE 12.5 MG: 25 INJECTION, SOLUTION INTRAMUSCULAR; INTRAVENOUS at 11:28

## 2017-04-29 RX ADMIN — Medication 30 ML: at 13:25

## 2017-04-29 ASSESSMENT — ENCOUNTER SYMPTOMS
STRIDOR: 0
SHORTNESS OF BREATH: 0
TROUBLE SWALLOWING: 0
BACK PAIN: 0
BLOOD IN STOOL: 1
COUGH: 0
VOMITING: 1
DIARRHEA: 0
WHEEZING: 0
EYE DISCHARGE: 0
EYE PAIN: 0
RHINORRHEA: 0
SORE THROAT: 0
NAUSEA: 0
ABDOMINAL PAIN: 1

## 2017-04-29 ASSESSMENT — PAIN SCALES - GENERAL: PAINLEVEL_OUTOF10: 8

## 2017-04-29 ASSESSMENT — PAIN DESCRIPTION - LOCATION: LOCATION: ABDOMEN

## 2017-05-01 ENCOUNTER — OFFICE VISIT (OUTPATIENT)
Dept: FAMILY MEDICINE CLINIC | Age: 27
End: 2017-05-01
Payer: COMMERCIAL

## 2017-05-01 VITALS
HEIGHT: 65 IN | DIASTOLIC BLOOD PRESSURE: 82 MMHG | BODY MASS INDEX: 36.55 KG/M2 | SYSTOLIC BLOOD PRESSURE: 130 MMHG | HEART RATE: 71 BPM | WEIGHT: 219.4 LBS | TEMPERATURE: 97.6 F

## 2017-05-01 DIAGNOSIS — L29.9 ITCHING WITH IRRITATION: ICD-10-CM

## 2017-05-01 DIAGNOSIS — J40 BRONCHITIS: ICD-10-CM

## 2017-05-01 DIAGNOSIS — K29.60 OTHER SPECIFIED GASTRITIS, PRESENCE OF BLEEDING UNSPECIFIED, UNSPECIFIED CHRONICITY: Primary | ICD-10-CM

## 2017-05-01 DIAGNOSIS — K59.00 CONSTIPATION, UNSPECIFIED CONSTIPATION TYPE: ICD-10-CM

## 2017-05-01 DIAGNOSIS — Z76.0 MEDICATION REFILL: ICD-10-CM

## 2017-05-01 PROBLEM — K29.70 GASTRITIS: Status: ACTIVE | Noted: 2017-05-01

## 2017-05-01 PROCEDURE — 99213 OFFICE O/P EST LOW 20 MIN: CPT | Performed by: STUDENT IN AN ORGANIZED HEALTH CARE EDUCATION/TRAINING PROGRAM

## 2017-05-01 RX ORDER — CLOTRIMAZOLE 1 %
CREAM (GRAM) TOPICAL 2 TIMES DAILY
Qty: 1 TUBE | Refills: 0 | Status: SHIPPED | OUTPATIENT
Start: 2017-05-01 | End: 2017-05-01

## 2017-05-01 RX ORDER — PANTOPRAZOLE SODIUM 40 MG/1
40 TABLET, DELAYED RELEASE ORAL DAILY
Qty: 30 TABLET | Refills: 3 | Status: ON HOLD | OUTPATIENT
Start: 2017-05-01 | End: 2017-12-15 | Stop reason: HOSPADM

## 2017-05-01 RX ORDER — PANTOPRAZOLE SODIUM 40 MG/1
40 TABLET, DELAYED RELEASE ORAL DAILY
Qty: 30 TABLET | Refills: 3 | Status: SHIPPED | OUTPATIENT
Start: 2017-05-01 | End: 2017-05-01

## 2017-05-01 RX ORDER — MELOXICAM 15 MG/1
15 TABLET ORAL DAILY
Qty: 30 TABLET | Refills: 11 | Status: SHIPPED | OUTPATIENT
Start: 2017-05-01 | End: 2017-05-01 | Stop reason: CLARIF

## 2017-05-01 RX ORDER — ACETAMINOPHEN 325 MG/1
650 TABLET ORAL EVERY 6 HOURS PRN
Qty: 20 TABLET | Refills: 0 | Status: SHIPPED | OUTPATIENT
Start: 2017-05-01 | End: 2017-05-01

## 2017-05-01 RX ORDER — PROMETHAZINE HYDROCHLORIDE 25 MG/1
25 TABLET ORAL EVERY 6 HOURS PRN
Qty: 10 TABLET | Refills: 0 | Status: CANCELLED | OUTPATIENT
Start: 2017-05-01 | End: 2017-05-08

## 2017-05-01 RX ORDER — AZITHROMYCIN 250 MG/1
TABLET, FILM COATED ORAL
Qty: 1 PACKET | Refills: 0 | Status: SHIPPED | OUTPATIENT
Start: 2017-05-01 | End: 2017-05-11

## 2017-05-01 RX ORDER — AZITHROMYCIN 250 MG/1
TABLET, FILM COATED ORAL
Qty: 1 PACKET | Refills: 0 | Status: SHIPPED | OUTPATIENT
Start: 2017-05-01 | End: 2017-05-01

## 2017-05-01 RX ORDER — ACETAMINOPHEN 325 MG/1
650 TABLET ORAL EVERY 4 HOURS PRN
Qty: 120 TABLET | Refills: 3 | Status: ON HOLD | OUTPATIENT
Start: 2017-05-01 | End: 2017-08-09 | Stop reason: HOSPADM

## 2017-05-01 RX ORDER — HYDROXYZINE 50 MG/1
25 TABLET, FILM COATED ORAL DAILY
Qty: 5 TABLET | Refills: 0 | Status: SHIPPED | OUTPATIENT
Start: 2017-05-01 | End: 2017-05-01

## 2017-05-01 RX ORDER — ONDANSETRON 4 MG/1
4 TABLET, FILM COATED ORAL EVERY 8 HOURS PRN
Qty: 30 TABLET | Refills: 0 | Status: SHIPPED | OUTPATIENT
Start: 2017-05-01 | End: 2017-05-01

## 2017-05-01 RX ORDER — CLOTRIMAZOLE AND BETAMETHASONE DIPROPIONATE 10; .64 MG/G; MG/G
CREAM TOPICAL
Qty: 2 TUBE | Refills: 2 | Status: SHIPPED | OUTPATIENT
Start: 2017-05-01 | End: 2017-05-31

## 2017-05-01 RX ORDER — HYDROXYZINE HYDROCHLORIDE 25 MG/1
25 TABLET, FILM COATED ORAL DAILY
Qty: 15 TABLET | Refills: 0 | Status: SHIPPED | OUTPATIENT
Start: 2017-05-01 | End: 2017-05-12

## 2017-05-01 RX ORDER — SENNA PLUS 8.6 MG/1
1 TABLET ORAL 2 TIMES DAILY
Qty: 30 TABLET | Refills: 0 | Status: SHIPPED | OUTPATIENT
Start: 2017-05-01 | End: 2017-05-01

## 2017-05-01 RX ORDER — PREDNISONE 1 MG/1
TABLET ORAL
Status: CANCELLED | OUTPATIENT
Start: 2017-05-01 | End: 2017-05-11

## 2017-05-01 RX ORDER — SENNA PLUS 8.6 MG/1
1 TABLET ORAL 2 TIMES DAILY
Qty: 60 TABLET | Refills: 1 | Status: SHIPPED | OUTPATIENT
Start: 2017-05-01 | End: 2017-08-07

## 2017-05-01 RX ORDER — MELOXICAM 15 MG/1
15 TABLET ORAL DAILY
Qty: 30 TABLET | Refills: 3 | Status: SHIPPED | OUTPATIENT
Start: 2017-05-01 | End: 2017-05-12

## 2017-05-01 RX ORDER — ONDANSETRON 4 MG/1
4 TABLET, FILM COATED ORAL EVERY 8 HOURS PRN
Qty: 30 TABLET | Refills: 0 | Status: SHIPPED | OUTPATIENT
Start: 2017-05-01 | End: 2017-05-11

## 2017-05-01 ASSESSMENT — ENCOUNTER SYMPTOMS
BACK PAIN: 0
BLOOD IN STOOL: 0
SINUS PRESSURE: 0
SHORTNESS OF BREATH: 1
COUGH: 1
SORE THROAT: 1
CONSTIPATION: 1
ABDOMINAL PAIN: 1
NAUSEA: 1
VOMITING: 1
WHEEZING: 0
RHINORRHEA: 1
VOICE CHANGE: 0

## 2017-05-04 ENCOUNTER — APPOINTMENT (OUTPATIENT)
Dept: GENERAL RADIOLOGY | Age: 27
End: 2017-05-04
Payer: COMMERCIAL

## 2017-05-04 ENCOUNTER — HOSPITAL ENCOUNTER (EMERGENCY)
Age: 27
Discharge: HOME | End: 2017-05-04
Attending: EMERGENCY MEDICINE
Payer: COMMERCIAL

## 2017-05-04 VITALS
OXYGEN SATURATION: 95 % | DIASTOLIC BLOOD PRESSURE: 93 MMHG | TEMPERATURE: 99.1 F | SYSTOLIC BLOOD PRESSURE: 153 MMHG | RESPIRATION RATE: 18 BRPM | HEART RATE: 86 BPM

## 2017-05-04 DIAGNOSIS — R11.2 NON-INTRACTABLE VOMITING WITH NAUSEA, UNSPECIFIED VOMITING TYPE: Primary | ICD-10-CM

## 2017-05-04 DIAGNOSIS — R10.12 ABDOMINAL PAIN, LEFT UPPER QUADRANT: ICD-10-CM

## 2017-05-04 LAB
ABSOLUTE EOS #: 0.2 K/UL (ref 0–0.4)
ABSOLUTE LYMPH #: 2.3 K/UL (ref 1–4.8)
ABSOLUTE MONO #: 0.6 K/UL (ref 0.1–1.2)
ALBUMIN SERPL-MCNC: 3.8 G/DL (ref 3.5–5.2)
ALBUMIN/GLOBULIN RATIO: 1.2 (ref 1–2.5)
ALP BLD-CCNC: 67 U/L (ref 40–129)
ALT SERPL-CCNC: 18 U/L (ref 5–41)
ANION GAP SERPL CALCULATED.3IONS-SCNC: 11 MMOL/L (ref 9–17)
AST SERPL-CCNC: 30 U/L
BASOPHILS # BLD: 1 %
BASOPHILS ABSOLUTE: 0.1 K/UL (ref 0–0.2)
BILIRUB SERPL-MCNC: 0.16 MG/DL (ref 0.3–1.2)
BUN BLDV-MCNC: 9 MG/DL (ref 6–20)
BUN/CREAT BLD: ABNORMAL (ref 9–20)
CALCIUM SERPL-MCNC: 8.9 MG/DL (ref 8.6–10.4)
CHLORIDE BLD-SCNC: 96 MMOL/L (ref 98–107)
CO2: 27 MMOL/L (ref 20–31)
CREAT SERPL-MCNC: 0.85 MG/DL (ref 0.7–1.2)
DIFFERENTIAL TYPE: ABNORMAL
EOSINOPHILS RELATIVE PERCENT: 3 %
GFR AFRICAN AMERICAN: >60 ML/MIN
GFR NON-AFRICAN AMERICAN: >60 ML/MIN
GFR SERPL CREATININE-BSD FRML MDRD: ABNORMAL ML/MIN/{1.73_M2}
GFR SERPL CREATININE-BSD FRML MDRD: ABNORMAL ML/MIN/{1.73_M2}
GLUCOSE BLD-MCNC: 124 MG/DL (ref 70–99)
HCT VFR BLD CALC: 38 % (ref 41–53)
HEMOGLOBIN: 13.1 G/DL (ref 13.5–17.5)
LIPASE: 30 U/L (ref 13–60)
LYMPHOCYTES # BLD: 37 %
MCH RBC QN AUTO: 29.6 PG (ref 26–34)
MCHC RBC AUTO-ENTMCNC: 34.5 G/DL (ref 31–37)
MCV RBC AUTO: 85.8 FL (ref 80–100)
MONOCYTES # BLD: 9 %
PDW BLD-RTO: 13.5 % (ref 12.5–15.4)
PLATELET # BLD: 247 K/UL (ref 140–450)
PLATELET ESTIMATE: ABNORMAL
PMV BLD AUTO: 8.1 FL (ref 6–12)
POTASSIUM SERPL-SCNC: 4 MMOL/L (ref 3.7–5.3)
RBC # BLD: 4.43 M/UL (ref 4.5–5.9)
RBC # BLD: ABNORMAL 10*6/UL
SEG NEUTROPHILS: 50 %
SEGMENTED NEUTROPHILS ABSOLUTE COUNT: 3.1 K/UL (ref 1.8–7.7)
SODIUM BLD-SCNC: 134 MMOL/L (ref 135–144)
TOTAL PROTEIN: 6.9 G/DL (ref 6.4–8.3)
WBC # BLD: 6.3 K/UL (ref 3.5–11)
WBC # BLD: ABNORMAL 10*3/UL

## 2017-05-04 PROCEDURE — 96361 HYDRATE IV INFUSION ADD-ON: CPT

## 2017-05-04 PROCEDURE — 6360000002 HC RX W HCPCS: Performed by: EMERGENCY MEDICINE

## 2017-05-04 PROCEDURE — 74022 RADEX COMPL AQT ABD SERIES: CPT

## 2017-05-04 PROCEDURE — 96374 THER/PROPH/DIAG INJ IV PUSH: CPT

## 2017-05-04 PROCEDURE — 85025 COMPLETE CBC W/AUTO DIFF WBC: CPT

## 2017-05-04 PROCEDURE — G0383 LEV 4 HOSP TYPE B ED VISIT: HCPCS

## 2017-05-04 PROCEDURE — 2580000003 HC RX 258: Performed by: EMERGENCY MEDICINE

## 2017-05-04 PROCEDURE — 96376 TX/PRO/DX INJ SAME DRUG ADON: CPT

## 2017-05-04 PROCEDURE — 83690 ASSAY OF LIPASE: CPT

## 2017-05-04 PROCEDURE — 80053 COMPREHEN METABOLIC PANEL: CPT

## 2017-05-04 PROCEDURE — 96375 TX/PRO/DX INJ NEW DRUG ADDON: CPT

## 2017-05-04 RX ORDER — PROMETHAZINE HYDROCHLORIDE 25 MG/ML
12.5 INJECTION, SOLUTION INTRAMUSCULAR; INTRAVENOUS ONCE
Status: COMPLETED | OUTPATIENT
Start: 2017-05-04 | End: 2017-05-04

## 2017-05-04 RX ORDER — KETOROLAC TROMETHAMINE 30 MG/ML
30 INJECTION, SOLUTION INTRAMUSCULAR; INTRAVENOUS ONCE
Status: COMPLETED | OUTPATIENT
Start: 2017-05-04 | End: 2017-05-04

## 2017-05-04 RX ORDER — PROMETHAZINE HYDROCHLORIDE 25 MG/1
25 TABLET ORAL EVERY 6 HOURS PRN
Qty: 20 TABLET | Refills: 0 | Status: SHIPPED | OUTPATIENT
Start: 2017-05-04 | End: 2017-05-11

## 2017-05-04 RX ORDER — DIPHENHYDRAMINE HYDROCHLORIDE 50 MG/ML
25 INJECTION INTRAMUSCULAR; INTRAVENOUS ONCE
Status: COMPLETED | OUTPATIENT
Start: 2017-05-04 | End: 2017-05-04

## 2017-05-04 RX ORDER — KETOROLAC TROMETHAMINE 30 MG/ML
30 INJECTION, SOLUTION INTRAMUSCULAR; INTRAVENOUS ONCE
Status: DISCONTINUED | OUTPATIENT
Start: 2017-05-04 | End: 2017-05-04

## 2017-05-04 RX ORDER — DIPHENHYDRAMINE HYDROCHLORIDE 50 MG/ML
50 INJECTION INTRAMUSCULAR; INTRAVENOUS ONCE
Status: COMPLETED | OUTPATIENT
Start: 2017-05-04 | End: 2017-05-04

## 2017-05-04 RX ORDER — 0.9 % SODIUM CHLORIDE 0.9 %
1000 INTRAVENOUS SOLUTION INTRAVENOUS ONCE
Status: COMPLETED | OUTPATIENT
Start: 2017-05-04 | End: 2017-05-04

## 2017-05-04 RX ORDER — FENTANYL CITRATE 50 UG/ML
100 INJECTION, SOLUTION INTRAMUSCULAR; INTRAVENOUS ONCE
Status: COMPLETED | OUTPATIENT
Start: 2017-05-04 | End: 2017-05-04

## 2017-05-04 RX ORDER — MORPHINE SULFATE 4 MG/ML
4 INJECTION, SOLUTION INTRAMUSCULAR; INTRAVENOUS ONCE
Status: COMPLETED | OUTPATIENT
Start: 2017-05-04 | End: 2017-05-04

## 2017-05-04 RX ADMIN — PROMETHAZINE HYDROCHLORIDE 12.5 MG: 25 INJECTION INTRAMUSCULAR; INTRAVENOUS at 18:25

## 2017-05-04 RX ADMIN — FENTANYL CITRATE 50 MCG: 50 INJECTION, SOLUTION INTRAMUSCULAR; INTRAVENOUS at 19:02

## 2017-05-04 RX ADMIN — KETOROLAC TROMETHAMINE 30 MG: 30 INJECTION, SOLUTION INTRAMUSCULAR at 18:25

## 2017-05-04 RX ADMIN — DIPHENHYDRAMINE HYDROCHLORIDE 50 MG: 50 INJECTION, SOLUTION INTRAMUSCULAR; INTRAVENOUS at 16:59

## 2017-05-04 RX ADMIN — SODIUM CHLORIDE 1000 ML: 9 INJECTION, SOLUTION INTRAVENOUS at 16:59

## 2017-05-04 RX ADMIN — PROMETHAZINE HYDROCHLORIDE 12.5 MG: 25 INJECTION INTRAMUSCULAR; INTRAVENOUS at 16:59

## 2017-05-04 RX ADMIN — DIPHENHYDRAMINE HYDROCHLORIDE 25 MG: 50 INJECTION, SOLUTION INTRAMUSCULAR; INTRAVENOUS at 18:06

## 2017-05-04 RX ADMIN — MORPHINE SULFATE 4 MG: 4 INJECTION, SOLUTION INTRAMUSCULAR; INTRAVENOUS at 18:06

## 2017-05-04 ASSESSMENT — PAIN DESCRIPTION - PAIN TYPE: TYPE: ACUTE PAIN;CHRONIC PAIN

## 2017-05-04 ASSESSMENT — ENCOUNTER SYMPTOMS
BACK PAIN: 0
CONSTIPATION: 0
BLOOD IN STOOL: 1
VOMITING: 1
SINUS PRESSURE: 1
DIARRHEA: 1
SHORTNESS OF BREATH: 0
NAUSEA: 1
COUGH: 1
PHOTOPHOBIA: 0
TROUBLE SWALLOWING: 0
SORE THROAT: 1
RHINORRHEA: 0
ABDOMINAL PAIN: 1

## 2017-05-04 ASSESSMENT — PAIN SCALES - GENERAL
PAINLEVEL_OUTOF10: 9
PAINLEVEL_OUTOF10: 7
PAINLEVEL_OUTOF10: 6
PAINLEVEL_OUTOF10: 7

## 2017-05-04 ASSESSMENT — PAIN DESCRIPTION - DESCRIPTORS: DESCRIPTORS: ACHING;CONSTANT;DISCOMFORT

## 2017-05-04 ASSESSMENT — PAIN DESCRIPTION - LOCATION: LOCATION: ABDOMEN

## 2017-05-04 ASSESSMENT — PAIN DESCRIPTION - ONSET: ONSET: PROGRESSIVE

## 2017-05-04 ASSESSMENT — PAIN DESCRIPTION - ORIENTATION: ORIENTATION: RIGHT;MID;UPPER

## 2017-05-04 ASSESSMENT — PAIN DESCRIPTION - FREQUENCY: FREQUENCY: CONTINUOUS

## 2017-05-12 ENCOUNTER — HOSPITAL ENCOUNTER (EMERGENCY)
Age: 27
Discharge: HOME | End: 2017-05-12
Attending: EMERGENCY MEDICINE
Payer: COMMERCIAL

## 2017-05-12 VITALS
WEIGHT: 240 LBS | TEMPERATURE: 98 F | BODY MASS INDEX: 39.99 KG/M2 | RESPIRATION RATE: 20 BRPM | SYSTOLIC BLOOD PRESSURE: 138 MMHG | DIASTOLIC BLOOD PRESSURE: 82 MMHG | HEART RATE: 90 BPM | OXYGEN SATURATION: 96 %

## 2017-05-12 DIAGNOSIS — R21 RASH AND OTHER NONSPECIFIC SKIN ERUPTION: ICD-10-CM

## 2017-05-12 DIAGNOSIS — R10.13 ABDOMINAL PAIN, EPIGASTRIC: Primary | ICD-10-CM

## 2017-05-12 DIAGNOSIS — R11.2 NON-INTRACTABLE VOMITING WITH NAUSEA, UNSPECIFIED VOMITING TYPE: ICD-10-CM

## 2017-05-12 DIAGNOSIS — Z87.11 HISTORY OF GASTRIC ULCER: ICD-10-CM

## 2017-05-12 DIAGNOSIS — R19.7 DIARRHEA, UNSPECIFIED TYPE: ICD-10-CM

## 2017-05-12 DIAGNOSIS — L29.9 ITCHING WITH IRRITATION: ICD-10-CM

## 2017-05-12 LAB
ABSOLUTE EOS #: 0.2 K/UL (ref 0–0.4)
ABSOLUTE LYMPH #: 2.6 K/UL (ref 1–4.8)
ABSOLUTE MONO #: 0.5 K/UL (ref 0.1–1.2)
ALBUMIN SERPL-MCNC: 3.9 G/DL (ref 3.5–5.2)
ALBUMIN/GLOBULIN RATIO: 1.2 (ref 1–2.5)
ALP BLD-CCNC: 67 U/L (ref 40–129)
ALT SERPL-CCNC: 42 U/L (ref 5–41)
ANION GAP SERPL CALCULATED.3IONS-SCNC: 13 MMOL/L (ref 9–17)
AST SERPL-CCNC: 48 U/L
BASOPHILS # BLD: 3 %
BASOPHILS ABSOLUTE: 0.2 K/UL (ref 0–0.2)
BILIRUB SERPL-MCNC: 0.16 MG/DL (ref 0.3–1.2)
BUN BLDV-MCNC: 8 MG/DL (ref 6–20)
BUN/CREAT BLD: ABNORMAL (ref 9–20)
CALCIUM SERPL-MCNC: 9.2 MG/DL (ref 8.6–10.4)
CHLORIDE BLD-SCNC: 100 MMOL/L (ref 98–107)
CO2: 23 MMOL/L (ref 20–31)
CREAT SERPL-MCNC: 0.74 MG/DL (ref 0.7–1.2)
DIFFERENTIAL TYPE: ABNORMAL
EOSINOPHILS RELATIVE PERCENT: 3 %
GFR AFRICAN AMERICAN: >60 ML/MIN
GFR NON-AFRICAN AMERICAN: >60 ML/MIN
GFR SERPL CREATININE-BSD FRML MDRD: ABNORMAL ML/MIN/{1.73_M2}
GFR SERPL CREATININE-BSD FRML MDRD: ABNORMAL ML/MIN/{1.73_M2}
GLUCOSE BLD-MCNC: 89 MG/DL (ref 70–99)
HCT VFR BLD CALC: 40.1 % (ref 41–53)
HEMOGLOBIN: 13.8 G/DL (ref 13.5–17.5)
LIPASE: 26 U/L (ref 13–60)
LYMPHOCYTES # BLD: 43 %
MCH RBC QN AUTO: 29.3 PG (ref 26–34)
MCHC RBC AUTO-ENTMCNC: 34.4 G/DL (ref 31–37)
MCV RBC AUTO: 85.2 FL (ref 80–100)
MONOCYTES # BLD: 8 %
PDW BLD-RTO: 13.4 % (ref 12.5–15.4)
PLATELET # BLD: 181 K/UL (ref 140–450)
PLATELET ESTIMATE: ABNORMAL
PMV BLD AUTO: 8.1 FL (ref 6–12)
POTASSIUM SERPL-SCNC: 4 MMOL/L (ref 3.7–5.3)
RBC # BLD: 4.7 M/UL (ref 4.5–5.9)
RBC # BLD: ABNORMAL 10*6/UL
SEG NEUTROPHILS: 43 %
SEGMENTED NEUTROPHILS ABSOLUTE COUNT: 2.7 K/UL (ref 1.8–7.7)
SODIUM BLD-SCNC: 136 MMOL/L (ref 135–144)
TOTAL PROTEIN: 7.2 G/DL (ref 6.4–8.3)
WBC # BLD: 6.1 K/UL (ref 3.5–11)
WBC # BLD: ABNORMAL 10*3/UL

## 2017-05-12 PROCEDURE — 83690 ASSAY OF LIPASE: CPT

## 2017-05-12 PROCEDURE — 6370000000 HC RX 637 (ALT 250 FOR IP): Performed by: EMERGENCY MEDICINE

## 2017-05-12 PROCEDURE — 96375 TX/PRO/DX INJ NEW DRUG ADDON: CPT

## 2017-05-12 PROCEDURE — 96376 TX/PRO/DX INJ SAME DRUG ADON: CPT

## 2017-05-12 PROCEDURE — G0382 LEV 3 HOSP TYPE B ED VISIT: HCPCS

## 2017-05-12 PROCEDURE — 85025 COMPLETE CBC W/AUTO DIFF WBC: CPT

## 2017-05-12 PROCEDURE — 6360000002 HC RX W HCPCS: Performed by: EMERGENCY MEDICINE

## 2017-05-12 PROCEDURE — 96374 THER/PROPH/DIAG INJ IV PUSH: CPT

## 2017-05-12 PROCEDURE — 80053 COMPREHEN METABOLIC PANEL: CPT

## 2017-05-12 RX ORDER — ONDANSETRON 2 MG/ML
4 INJECTION INTRAMUSCULAR; INTRAVENOUS ONCE
Status: COMPLETED | OUTPATIENT
Start: 2017-05-12 | End: 2017-05-12

## 2017-05-12 RX ORDER — DIPHENHYDRAMINE HCL 25 MG
25 TABLET ORAL EVERY 6 HOURS PRN
Status: DISCONTINUED | OUTPATIENT
Start: 2017-05-12 | End: 2017-05-12 | Stop reason: HOSPADM

## 2017-05-12 RX ORDER — DIPHENHYDRAMINE HCL 25 MG
25 CAPSULE ORAL EVERY 8 HOURS PRN
Qty: 30 CAPSULE | Refills: 0 | Status: ON HOLD | OUTPATIENT
Start: 2017-05-12 | End: 2017-08-09 | Stop reason: HOSPADM

## 2017-05-12 RX ORDER — HYDROXYZINE HYDROCHLORIDE 10 MG/1
10 TABLET, FILM COATED ORAL ONCE
Status: DISCONTINUED | OUTPATIENT
Start: 2017-05-12 | End: 2017-05-12 | Stop reason: HOSPADM

## 2017-05-12 RX ORDER — HYDROXYZINE HYDROCHLORIDE 25 MG/1
25 TABLET, FILM COATED ORAL DAILY
Qty: 15 TABLET | Refills: 0 | Status: SHIPPED | OUTPATIENT
Start: 2017-05-12 | End: 2017-05-27

## 2017-05-12 RX ORDER — ACETAMINOPHEN AND CODEINE PHOSPHATE 300; 30 MG/1; MG/1
1 TABLET ORAL 3 TIMES DAILY PRN
Qty: 15 TABLET | Refills: 0 | Status: SHIPPED | OUTPATIENT
Start: 2017-05-12 | End: 2018-03-21 | Stop reason: SINTOL

## 2017-05-12 RX ORDER — MORPHINE SULFATE 4 MG/ML
4 INJECTION, SOLUTION INTRAMUSCULAR; INTRAVENOUS ONCE
Status: COMPLETED | OUTPATIENT
Start: 2017-05-12 | End: 2017-05-12

## 2017-05-12 RX ORDER — DIPHENHYDRAMINE HYDROCHLORIDE 50 MG/ML
25 INJECTION INTRAMUSCULAR; INTRAVENOUS ONCE
Status: COMPLETED | OUTPATIENT
Start: 2017-05-12 | End: 2017-05-12

## 2017-05-12 RX ADMIN — ONDANSETRON 4 MG: 2 INJECTION, SOLUTION INTRAMUSCULAR; INTRAVENOUS at 14:17

## 2017-05-12 RX ADMIN — DIPHENHYDRAMINE HCL 25 MG: 25 TABLET ORAL at 16:08

## 2017-05-12 RX ADMIN — MORPHINE SULFATE 4 MG: 4 INJECTION, SOLUTION INTRAMUSCULAR; INTRAVENOUS at 14:17

## 2017-05-12 RX ADMIN — DIPHENHYDRAMINE HYDROCHLORIDE 25 MG: 50 INJECTION, SOLUTION INTRAMUSCULAR; INTRAVENOUS at 14:17

## 2017-05-12 RX ADMIN — ONDANSETRON 4 MG: 2 INJECTION, SOLUTION INTRAMUSCULAR; INTRAVENOUS at 15:17

## 2017-05-12 ASSESSMENT — ENCOUNTER SYMPTOMS
VOMITING: 1
BACK PAIN: 0
SHORTNESS OF BREATH: 0
DIARRHEA: 1
RHINORRHEA: 0
TROUBLE SWALLOWING: 0
NAUSEA: 1
BLOOD IN STOOL: 1
CONSTIPATION: 0
ABDOMINAL PAIN: 1

## 2017-05-12 ASSESSMENT — PAIN DESCRIPTION - PAIN TYPE: TYPE: ACUTE PAIN

## 2017-05-12 ASSESSMENT — PAIN SCALES - GENERAL
PAINLEVEL_OUTOF10: 9
PAINLEVEL_OUTOF10: 9

## 2017-05-12 ASSESSMENT — PAIN DESCRIPTION - LOCATION: LOCATION: ABDOMEN

## 2017-05-15 ENCOUNTER — HOSPITAL ENCOUNTER (OUTPATIENT)
Age: 27
Setting detail: SPECIMEN
Discharge: HOME | End: 2017-05-15
Payer: COMMERCIAL

## 2017-05-15 ENCOUNTER — OFFICE VISIT (OUTPATIENT)
Dept: FAMILY MEDICINE CLINIC | Age: 27
End: 2017-05-15
Payer: COMMERCIAL

## 2017-05-15 VITALS
HEART RATE: 82 BPM | SYSTOLIC BLOOD PRESSURE: 138 MMHG | BODY MASS INDEX: 38.35 KG/M2 | DIASTOLIC BLOOD PRESSURE: 86 MMHG | HEIGHT: 65 IN | WEIGHT: 230.2 LBS | TEMPERATURE: 97 F

## 2017-05-15 DIAGNOSIS — K29.70 GASTRITIS WITHOUT BLEEDING, UNSPECIFIED CHRONICITY, UNSPECIFIED GASTRITIS TYPE: Primary | ICD-10-CM

## 2017-05-15 DIAGNOSIS — K59.00 CONSTIPATION, UNSPECIFIED CONSTIPATION TYPE: ICD-10-CM

## 2017-05-15 PROCEDURE — 99213 OFFICE O/P EST LOW 20 MIN: CPT | Performed by: STUDENT IN AN ORGANIZED HEALTH CARE EDUCATION/TRAINING PROGRAM

## 2017-05-15 PROCEDURE — 36415 COLL VENOUS BLD VENIPUNCTURE: CPT | Performed by: STUDENT IN AN ORGANIZED HEALTH CARE EDUCATION/TRAINING PROGRAM

## 2017-05-15 ASSESSMENT — ENCOUNTER SYMPTOMS
SHORTNESS OF BREATH: 0
ABDOMINAL DISTENTION: 0
CONSTIPATION: 1
WHEEZING: 0
COUGH: 0
DIARRHEA: 0
ABDOMINAL PAIN: 1
VOMITING: 1
NAUSEA: 1
BLOOD IN STOOL: 0

## 2017-05-24 ENCOUNTER — HOSPITAL ENCOUNTER (EMERGENCY)
Age: 27
Discharge: LEFT BEFORE DISCHARGE | End: 2017-05-24
Attending: EMERGENCY MEDICINE
Payer: COMMERCIAL

## 2017-05-24 VITALS
OXYGEN SATURATION: 96 % | TEMPERATURE: 99 F | RESPIRATION RATE: 16 BRPM | HEART RATE: 85 BPM | HEIGHT: 64 IN | WEIGHT: 230 LBS | SYSTOLIC BLOOD PRESSURE: 151 MMHG | DIASTOLIC BLOOD PRESSURE: 94 MMHG | BODY MASS INDEX: 39.27 KG/M2

## 2017-05-24 DIAGNOSIS — K92.0 HEMATEMESIS WITH NAUSEA: Primary | ICD-10-CM

## 2017-05-24 DIAGNOSIS — K92.1 BLOOD IN STOOL: ICD-10-CM

## 2017-05-24 LAB
ABSOLUTE EOS #: 0.1 K/UL (ref 0–0.4)
ABSOLUTE LYMPH #: 2.3 K/UL (ref 1–4.8)
ABSOLUTE MONO #: 0.5 K/UL (ref 0.1–1.2)
ANION GAP SERPL CALCULATED.3IONS-SCNC: 10 MMOL/L (ref 9–17)
BASOPHILS # BLD: 0 %
BASOPHILS ABSOLUTE: 0 K/UL (ref 0–0.2)
BUN BLDV-MCNC: 10 MG/DL (ref 6–20)
BUN/CREAT BLD: ABNORMAL (ref 9–20)
CALCIUM SERPL-MCNC: 8.6 MG/DL (ref 8.6–10.4)
CHLORIDE BLD-SCNC: 103 MMOL/L (ref 98–107)
CO2: 27 MMOL/L (ref 20–31)
CREAT SERPL-MCNC: 0.95 MG/DL (ref 0.7–1.2)
DIFFERENTIAL TYPE: ABNORMAL
EOSINOPHILS RELATIVE PERCENT: 2 %
GFR AFRICAN AMERICAN: >60 ML/MIN
GFR NON-AFRICAN AMERICAN: >60 ML/MIN
GFR SERPL CREATININE-BSD FRML MDRD: ABNORMAL ML/MIN/{1.73_M2}
GFR SERPL CREATININE-BSD FRML MDRD: ABNORMAL ML/MIN/{1.73_M2}
GLUCOSE BLD-MCNC: 106 MG/DL (ref 70–99)
HCT VFR BLD CALC: 38.3 % (ref 41–53)
HEMOGLOBIN: 13.2 G/DL (ref 13.5–17.5)
LYMPHOCYTES # BLD: 45 %
MCH RBC QN AUTO: 29.4 PG (ref 26–34)
MCHC RBC AUTO-ENTMCNC: 34.3 G/DL (ref 31–37)
MCV RBC AUTO: 85.5 FL (ref 80–100)
MONOCYTES # BLD: 9 %
PDW BLD-RTO: 13.7 % (ref 12.5–15.4)
PLATELET # BLD: 297 K/UL (ref 140–450)
PLATELET ESTIMATE: ABNORMAL
PMV BLD AUTO: 7.2 FL (ref 6–12)
POTASSIUM SERPL-SCNC: 4.4 MMOL/L (ref 3.7–5.3)
RBC # BLD: 4.48 M/UL (ref 4.5–5.9)
RBC # BLD: ABNORMAL 10*6/UL
SEG NEUTROPHILS: 44 %
SEGMENTED NEUTROPHILS ABSOLUTE COUNT: 2.3 K/UL (ref 1.8–7.7)
SODIUM BLD-SCNC: 140 MMOL/L (ref 135–144)
WBC # BLD: 5.2 K/UL (ref 3.5–11)
WBC # BLD: ABNORMAL 10*3/UL

## 2017-05-24 PROCEDURE — 6360000002 HC RX W HCPCS

## 2017-05-24 PROCEDURE — 99283 EMERGENCY DEPT VISIT LOW MDM: CPT

## 2017-05-24 PROCEDURE — 85025 COMPLETE CBC W/AUTO DIFF WBC: CPT

## 2017-05-24 PROCEDURE — 80048 BASIC METABOLIC PNL TOTAL CA: CPT

## 2017-05-24 RX ORDER — ONDANSETRON 4 MG/1
TABLET, FILM COATED ORAL
Status: DISCONTINUED
Start: 2017-05-24 | End: 2017-05-24 | Stop reason: HOSPADM

## 2017-05-24 RX ORDER — ONDANSETRON 4 MG/1
4 TABLET, FILM COATED ORAL ONCE
Status: DISCONTINUED | OUTPATIENT
Start: 2017-05-24 | End: 2017-05-24 | Stop reason: HOSPADM

## 2017-05-24 ASSESSMENT — ENCOUNTER SYMPTOMS
BLOOD IN STOOL: 1
VOMITING: 1
NAUSEA: 1
RESPIRATORY NEGATIVE: 1
EYES NEGATIVE: 1
ALLERGIC/IMMUNOLOGIC NEGATIVE: 1

## 2017-05-24 ASSESSMENT — PAIN DESCRIPTION - LOCATION: LOCATION: ARM

## 2017-05-24 ASSESSMENT — PAIN DESCRIPTION - DESCRIPTORS: DESCRIPTORS: SORE

## 2017-05-24 ASSESSMENT — PAIN DESCRIPTION - ORIENTATION: ORIENTATION: RIGHT

## 2017-05-24 ASSESSMENT — PAIN DESCRIPTION - FREQUENCY: FREQUENCY: CONTINUOUS

## 2017-05-24 ASSESSMENT — PAIN DESCRIPTION - PAIN TYPE: TYPE: ACUTE PAIN

## 2017-05-24 ASSESSMENT — PAIN SCALES - GENERAL: PAINLEVEL_OUTOF10: 8

## 2017-05-31 ENCOUNTER — HOSPITAL ENCOUNTER (EMERGENCY)
Age: 27
Discharge: HOME | End: 2017-05-31
Attending: EMERGENCY MEDICINE
Payer: COMMERCIAL

## 2017-05-31 VITALS
TEMPERATURE: 97.9 F | RESPIRATION RATE: 18 BRPM | BODY MASS INDEX: 35.55 KG/M2 | OXYGEN SATURATION: 97 % | HEART RATE: 84 BPM | DIASTOLIC BLOOD PRESSURE: 88 MMHG | SYSTOLIC BLOOD PRESSURE: 143 MMHG | WEIGHT: 240 LBS | HEIGHT: 69 IN

## 2017-05-31 DIAGNOSIS — K29.00 ACUTE GASTRITIS WITHOUT HEMORRHAGE, UNSPECIFIED GASTRITIS TYPE: Primary | ICD-10-CM

## 2017-05-31 LAB
ABSOLUTE EOS #: 0.1 K/UL (ref 0–0.4)
ABSOLUTE LYMPH #: 2.1 K/UL (ref 1–4.8)
ABSOLUTE MONO #: 0.4 K/UL (ref 0.1–1.2)
ALBUMIN SERPL-MCNC: 3.9 G/DL (ref 3.5–5.2)
ALBUMIN/GLOBULIN RATIO: 1.2 (ref 1–2.5)
ALP BLD-CCNC: 60 U/L (ref 40–129)
ALT SERPL-CCNC: 36 U/L (ref 5–41)
ANION GAP SERPL CALCULATED.3IONS-SCNC: 14 MMOL/L (ref 9–17)
AST SERPL-CCNC: 38 U/L
BASOPHILS # BLD: 0 %
BASOPHILS ABSOLUTE: 0 K/UL (ref 0–0.2)
BILIRUB SERPL-MCNC: 0.27 MG/DL (ref 0.3–1.2)
BUN BLDV-MCNC: 10 MG/DL (ref 6–20)
BUN/CREAT BLD: ABNORMAL (ref 9–20)
CALCIUM SERPL-MCNC: 8.7 MG/DL (ref 8.6–10.4)
CHLORIDE BLD-SCNC: 101 MMOL/L (ref 98–107)
CO2: 25 MMOL/L (ref 20–31)
CREAT SERPL-MCNC: 0.87 MG/DL (ref 0.7–1.2)
DIFFERENTIAL TYPE: ABNORMAL
EOSINOPHILS RELATIVE PERCENT: 2 %
GFR AFRICAN AMERICAN: >60 ML/MIN
GFR NON-AFRICAN AMERICAN: >60 ML/MIN
GFR SERPL CREATININE-BSD FRML MDRD: ABNORMAL ML/MIN/{1.73_M2}
GFR SERPL CREATININE-BSD FRML MDRD: ABNORMAL ML/MIN/{1.73_M2}
GLUCOSE BLD-MCNC: 131 MG/DL (ref 70–99)
HCT VFR BLD CALC: 38.2 % (ref 41–53)
HEMOGLOBIN: 12.8 G/DL (ref 13.5–17.5)
LIPASE: 29 U/L (ref 13–60)
LYMPHOCYTES # BLD: 40 %
MCH RBC QN AUTO: 28.9 PG (ref 26–34)
MCHC RBC AUTO-ENTMCNC: 33.7 G/DL (ref 31–37)
MCV RBC AUTO: 85.8 FL (ref 80–100)
MONOCYTES # BLD: 9 %
PDW BLD-RTO: 13.4 % (ref 12.5–15.4)
PLATELET # BLD: 267 K/UL (ref 140–450)
PLATELET ESTIMATE: ABNORMAL
PMV BLD AUTO: 7.4 FL (ref 6–12)
POTASSIUM SERPL-SCNC: 3.9 MMOL/L (ref 3.7–5.3)
RBC # BLD: 4.44 M/UL (ref 4.5–5.9)
RBC # BLD: ABNORMAL 10*6/UL
SEG NEUTROPHILS: 49 %
SEGMENTED NEUTROPHILS ABSOLUTE COUNT: 2.5 K/UL (ref 1.8–7.7)
SODIUM BLD-SCNC: 140 MMOL/L (ref 135–144)
TOTAL PROTEIN: 7.2 G/DL (ref 6.4–8.3)
WBC # BLD: 5.2 K/UL (ref 3.5–11)
WBC # BLD: ABNORMAL 10*3/UL

## 2017-05-31 PROCEDURE — 83690 ASSAY OF LIPASE: CPT

## 2017-05-31 PROCEDURE — 6360000002 HC RX W HCPCS: Performed by: EMERGENCY MEDICINE

## 2017-05-31 PROCEDURE — 80053 COMPREHEN METABOLIC PANEL: CPT

## 2017-05-31 PROCEDURE — 96375 TX/PRO/DX INJ NEW DRUG ADDON: CPT

## 2017-05-31 PROCEDURE — 2580000003 HC RX 258: Performed by: EMERGENCY MEDICINE

## 2017-05-31 PROCEDURE — 85025 COMPLETE CBC W/AUTO DIFF WBC: CPT

## 2017-05-31 PROCEDURE — G0383 LEV 4 HOSP TYPE B ED VISIT: HCPCS

## 2017-05-31 PROCEDURE — 6370000000 HC RX 637 (ALT 250 FOR IP): Performed by: EMERGENCY MEDICINE

## 2017-05-31 PROCEDURE — 96374 THER/PROPH/DIAG INJ IV PUSH: CPT

## 2017-05-31 RX ORDER — MAGNESIUM HYDROXIDE/ALUMINUM HYDROXICE/SIMETHICONE 120; 1200; 1200 MG/30ML; MG/30ML; MG/30ML
30 SUSPENSION ORAL ONCE
Status: COMPLETED | OUTPATIENT
Start: 2017-05-31 | End: 2017-05-31

## 2017-05-31 RX ORDER — SUCRALFATE ORAL 1 G/10ML
1 SUSPENSION ORAL 4 TIMES DAILY
Qty: 1200 ML | Refills: 0 | Status: ON HOLD | OUTPATIENT
Start: 2017-05-31 | End: 2018-04-10 | Stop reason: ALTCHOICE

## 2017-05-31 RX ORDER — 0.9 % SODIUM CHLORIDE 0.9 %
1000 INTRAVENOUS SOLUTION INTRAVENOUS ONCE
Status: COMPLETED | OUTPATIENT
Start: 2017-05-31 | End: 2017-05-31

## 2017-05-31 RX ORDER — ONDANSETRON 2 MG/ML
4 INJECTION INTRAMUSCULAR; INTRAVENOUS ONCE
Status: COMPLETED | OUTPATIENT
Start: 2017-05-31 | End: 2017-05-31

## 2017-05-31 RX ORDER — DIPHENHYDRAMINE HYDROCHLORIDE 50 MG/ML
25 INJECTION INTRAMUSCULAR; INTRAVENOUS ONCE
Status: COMPLETED | OUTPATIENT
Start: 2017-05-31 | End: 2017-05-31

## 2017-05-31 RX ORDER — KETOROLAC TROMETHAMINE 30 MG/ML
15 INJECTION, SOLUTION INTRAMUSCULAR; INTRAVENOUS ONCE
Status: COMPLETED | OUTPATIENT
Start: 2017-05-31 | End: 2017-05-31

## 2017-05-31 RX ORDER — SUCRALFATE ORAL 1 G/10ML
1 SUSPENSION ORAL ONCE
Status: COMPLETED | OUTPATIENT
Start: 2017-05-31 | End: 2017-05-31

## 2017-05-31 RX ORDER — PROMETHAZINE HYDROCHLORIDE 25 MG/1
25 TABLET ORAL EVERY 6 HOURS PRN
Qty: 20 TABLET | Refills: 0 | Status: SHIPPED | OUTPATIENT
Start: 2017-05-31 | End: 2017-06-07

## 2017-05-31 RX ADMIN — KETOROLAC TROMETHAMINE 15 MG: 30 INJECTION, SOLUTION INTRAMUSCULAR at 14:28

## 2017-05-31 RX ADMIN — SUCRALFATE 1 G: 1 SUSPENSION ORAL at 13:46

## 2017-05-31 RX ADMIN — ALUMINUM HYDROXIDE, MAGNESIUM HYDROXIDE, AND SIMETHICONE 30 ML: 200; 200; 20 SUSPENSION ORAL at 13:30

## 2017-05-31 RX ADMIN — DIPHENHYDRAMINE HYDROCHLORIDE 25 MG: 50 INJECTION, SOLUTION INTRAMUSCULAR; INTRAVENOUS at 14:28

## 2017-05-31 RX ADMIN — SODIUM CHLORIDE 1000 ML: 9 INJECTION, SOLUTION INTRAVENOUS at 13:30

## 2017-05-31 RX ADMIN — ONDANSETRON 4 MG: 2 INJECTION, SOLUTION INTRAMUSCULAR; INTRAVENOUS at 13:30

## 2017-05-31 ASSESSMENT — ENCOUNTER SYMPTOMS
SHORTNESS OF BREATH: 0
SORE THROAT: 0
ABDOMINAL PAIN: 1
NAUSEA: 1
VOMITING: 1

## 2017-05-31 ASSESSMENT — PAIN DESCRIPTION - ONSET: ONSET: ON-GOING

## 2017-05-31 ASSESSMENT — PAIN SCALES - GENERAL
PAINLEVEL_OUTOF10: 9
PAINLEVEL_OUTOF10: 9

## 2017-05-31 ASSESSMENT — PAIN DESCRIPTION - LOCATION: LOCATION: ABDOMEN

## 2017-05-31 ASSESSMENT — PAIN DESCRIPTION - PAIN TYPE: TYPE: ACUTE PAIN

## 2017-05-31 ASSESSMENT — PAIN DESCRIPTION - DESCRIPTORS: DESCRIPTORS: SHARP

## 2017-06-07 ENCOUNTER — HOSPITAL ENCOUNTER (EMERGENCY)
Age: 27
Discharge: LEFT BEFORE DISCHARGE | End: 2017-06-07
Attending: EMERGENCY MEDICINE
Payer: COMMERCIAL

## 2017-06-07 VITALS
BODY MASS INDEX: 37.03 KG/M2 | TEMPERATURE: 99.1 F | DIASTOLIC BLOOD PRESSURE: 104 MMHG | WEIGHT: 250 LBS | RESPIRATION RATE: 16 BRPM | SYSTOLIC BLOOD PRESSURE: 149 MMHG | OXYGEN SATURATION: 98 % | HEIGHT: 69 IN | HEART RATE: 96 BPM

## 2017-06-07 DIAGNOSIS — Z76.5 DRUG-SEEKING BEHAVIOR: ICD-10-CM

## 2017-06-07 DIAGNOSIS — R10.84 CHRONIC GENERALIZED ABDOMINAL PAIN: Primary | ICD-10-CM

## 2017-06-07 DIAGNOSIS — G89.29 CHRONIC GENERALIZED ABDOMINAL PAIN: Primary | ICD-10-CM

## 2017-06-07 DIAGNOSIS — K92.0 HEMATEMESIS WITH NAUSEA: ICD-10-CM

## 2017-06-07 PROCEDURE — 99284 EMERGENCY DEPT VISIT MOD MDM: CPT

## 2017-06-07 RX ORDER — ONDANSETRON 4 MG/1
4 TABLET, FILM COATED ORAL ONCE
Status: DISCONTINUED | OUTPATIENT
Start: 2017-06-07 | End: 2017-06-07 | Stop reason: HOSPADM

## 2017-06-07 RX ORDER — FAMOTIDINE 20 MG/1
20 TABLET, FILM COATED ORAL ONCE
Status: DISCONTINUED | OUTPATIENT
Start: 2017-06-07 | End: 2017-06-07 | Stop reason: HOSPADM

## 2017-06-07 RX ORDER — ACETAMINOPHEN 325 MG/1
650 TABLET ORAL ONCE
Status: DISCONTINUED | OUTPATIENT
Start: 2017-06-07 | End: 2017-06-07 | Stop reason: HOSPADM

## 2017-06-07 RX ORDER — MAGNESIUM HYDROXIDE/ALUMINUM HYDROXICE/SIMETHICONE 120; 1200; 1200 MG/30ML; MG/30ML; MG/30ML
30 SUSPENSION ORAL
Status: DISCONTINUED | OUTPATIENT
Start: 2017-06-07 | End: 2017-06-07 | Stop reason: HOSPADM

## 2017-06-07 ASSESSMENT — PAIN DESCRIPTION - LOCATION: LOCATION: ABDOMEN;HEAD

## 2017-06-07 ASSESSMENT — PAIN DESCRIPTION - DESCRIPTORS: DESCRIPTORS: SHARP;THROBBING

## 2017-06-07 ASSESSMENT — ENCOUNTER SYMPTOMS
ABDOMINAL PAIN: 1
EYES NEGATIVE: 1
VOMITING: 1
DIARRHEA: 1
NAUSEA: 1
RESPIRATORY NEGATIVE: 1
ALLERGIC/IMMUNOLOGIC NEGATIVE: 1
RECTAL PAIN: 0

## 2017-06-07 ASSESSMENT — PAIN DESCRIPTION - PAIN TYPE: TYPE: ACUTE PAIN

## 2017-06-07 ASSESSMENT — PAIN SCALES - GENERAL: PAINLEVEL_OUTOF10: 10

## 2017-06-07 ASSESSMENT — PAIN DESCRIPTION - FREQUENCY: FREQUENCY: CONTINUOUS

## 2017-06-23 ENCOUNTER — TELEPHONE (OUTPATIENT)
Dept: FAMILY MEDICINE CLINIC | Age: 27
End: 2017-06-23

## 2017-06-24 ENCOUNTER — APPOINTMENT (OUTPATIENT)
Dept: GENERAL RADIOLOGY | Age: 27
End: 2017-06-24
Payer: COMMERCIAL

## 2017-06-24 ENCOUNTER — HOSPITAL ENCOUNTER (EMERGENCY)
Age: 27
Discharge: HOME | End: 2017-06-24
Attending: EMERGENCY MEDICINE
Payer: COMMERCIAL

## 2017-06-24 VITALS
SYSTOLIC BLOOD PRESSURE: 144 MMHG | BODY MASS INDEX: 37.89 KG/M2 | TEMPERATURE: 99 F | HEIGHT: 68 IN | RESPIRATION RATE: 18 BRPM | OXYGEN SATURATION: 94 % | HEART RATE: 97 BPM | WEIGHT: 250 LBS | DIASTOLIC BLOOD PRESSURE: 91 MMHG

## 2017-06-24 DIAGNOSIS — G89.29 CHRONIC ABDOMINAL PAIN: Primary | ICD-10-CM

## 2017-06-24 DIAGNOSIS — R10.9 CHRONIC ABDOMINAL PAIN: Primary | ICD-10-CM

## 2017-06-24 DIAGNOSIS — K29.60 OTHER SPECIFIED GASTRITIS: ICD-10-CM

## 2017-06-24 LAB
ABSOLUTE EOS #: 0.1 K/UL (ref 0–0.4)
ABSOLUTE LYMPH #: 2.2 K/UL (ref 1–4.8)
ABSOLUTE MONO #: 0.5 K/UL (ref 0.1–1.2)
ANION GAP SERPL CALCULATED.3IONS-SCNC: 11 MMOL/L (ref 9–17)
BASOPHILS # BLD: 1 %
BASOPHILS ABSOLUTE: 0.1 K/UL (ref 0–0.2)
BUN BLDV-MCNC: 11 MG/DL (ref 6–20)
BUN/CREAT BLD: ABNORMAL (ref 9–20)
CALCIUM SERPL-MCNC: 8.7 MG/DL (ref 8.6–10.4)
CHLORIDE BLD-SCNC: 97 MMOL/L (ref 98–107)
CO2: 27 MMOL/L (ref 20–31)
CREAT SERPL-MCNC: 1.03 MG/DL (ref 0.7–1.2)
DIFFERENTIAL TYPE: ABNORMAL
EOSINOPHILS RELATIVE PERCENT: 2 %
GFR AFRICAN AMERICAN: >60 ML/MIN
GFR NON-AFRICAN AMERICAN: >60 ML/MIN
GFR SERPL CREATININE-BSD FRML MDRD: ABNORMAL ML/MIN/{1.73_M2}
GFR SERPL CREATININE-BSD FRML MDRD: ABNORMAL ML/MIN/{1.73_M2}
GLUCOSE BLD-MCNC: 86 MG/DL (ref 70–99)
HCT VFR BLD CALC: 36.9 % (ref 41–53)
HEMOGLOBIN: 12.8 G/DL (ref 13.5–17.5)
LIPASE: 27 U/L (ref 13–60)
LYMPHOCYTES # BLD: 32 %
MCH RBC QN AUTO: 29.1 PG (ref 26–34)
MCHC RBC AUTO-ENTMCNC: 34.6 G/DL (ref 31–37)
MCV RBC AUTO: 84.2 FL (ref 80–100)
MONOCYTES # BLD: 8 %
PDW BLD-RTO: 13.5 % (ref 12.5–15.4)
PLATELET # BLD: 194 K/UL (ref 140–450)
PLATELET ESTIMATE: ABNORMAL
PMV BLD AUTO: 7.5 FL (ref 6–12)
POTASSIUM SERPL-SCNC: 5.7 MMOL/L (ref 3.7–5.3)
RBC # BLD: 4.38 M/UL (ref 4.5–5.9)
RBC # BLD: ABNORMAL 10*6/UL
SEG NEUTROPHILS: 57 %
SEGMENTED NEUTROPHILS ABSOLUTE COUNT: 3.9 K/UL (ref 1.8–7.7)
SODIUM BLD-SCNC: 135 MMOL/L (ref 135–144)
WBC # BLD: 6.8 K/UL (ref 3.5–11)
WBC # BLD: ABNORMAL 10*3/UL

## 2017-06-24 PROCEDURE — 96372 THER/PROPH/DIAG INJ SC/IM: CPT

## 2017-06-24 PROCEDURE — 99284 EMERGENCY DEPT VISIT MOD MDM: CPT

## 2017-06-24 PROCEDURE — 83690 ASSAY OF LIPASE: CPT

## 2017-06-24 PROCEDURE — 6360000002 HC RX W HCPCS: Performed by: EMERGENCY MEDICINE

## 2017-06-24 PROCEDURE — 96375 TX/PRO/DX INJ NEW DRUG ADDON: CPT

## 2017-06-24 PROCEDURE — 2580000003 HC RX 258: Performed by: EMERGENCY MEDICINE

## 2017-06-24 PROCEDURE — 85025 COMPLETE CBC W/AUTO DIFF WBC: CPT

## 2017-06-24 PROCEDURE — 80048 BASIC METABOLIC PNL TOTAL CA: CPT

## 2017-06-24 PROCEDURE — 96361 HYDRATE IV INFUSION ADD-ON: CPT

## 2017-06-24 PROCEDURE — 74022 RADEX COMPL AQT ABD SERIES: CPT

## 2017-06-24 PROCEDURE — 96374 THER/PROPH/DIAG INJ IV PUSH: CPT

## 2017-06-24 PROCEDURE — 6370000000 HC RX 637 (ALT 250 FOR IP): Performed by: EMERGENCY MEDICINE

## 2017-06-24 RX ORDER — DIPHENHYDRAMINE HCL 25 MG
25 TABLET ORAL ONCE
Status: COMPLETED | OUTPATIENT
Start: 2017-06-24 | End: 2017-06-24

## 2017-06-24 RX ORDER — DICYCLOMINE HYDROCHLORIDE 10 MG/ML
20 INJECTION INTRAMUSCULAR ONCE
Status: DISCONTINUED | OUTPATIENT
Start: 2017-06-24 | End: 2017-06-24 | Stop reason: HOSPADM

## 2017-06-24 RX ORDER — MAGNESIUM HYDROXIDE/ALUMINUM HYDROXICE/SIMETHICONE 120; 1200; 1200 MG/30ML; MG/30ML; MG/30ML
30 SUSPENSION ORAL
Status: COMPLETED | OUTPATIENT
Start: 2017-06-24 | End: 2017-06-24

## 2017-06-24 RX ORDER — CLARITHROMYCIN 500 MG/1
500 TABLET, COATED ORAL 2 TIMES DAILY
Qty: 28 TABLET | Refills: 0 | Status: SHIPPED | OUTPATIENT
Start: 2017-06-24 | End: 2017-07-04

## 2017-06-24 RX ORDER — PROMETHAZINE HYDROCHLORIDE 25 MG/1
12.5 TABLET ORAL EVERY 8 HOURS PRN
Qty: 5 TABLET | Refills: 0 | Status: ON HOLD | OUTPATIENT
Start: 2017-06-24 | End: 2017-08-09 | Stop reason: HOSPADM

## 2017-06-24 RX ORDER — LANSOPRAZOLE 30 MG/1
30 CAPSULE, DELAYED RELEASE ORAL 2 TIMES DAILY
Qty: 28 CAPSULE | Refills: 0 | Status: SHIPPED | OUTPATIENT
Start: 2017-06-24 | End: 2017-08-07

## 2017-06-24 RX ORDER — 0.9 % SODIUM CHLORIDE 0.9 %
1000 INTRAVENOUS SOLUTION INTRAVENOUS ONCE
Status: COMPLETED | OUTPATIENT
Start: 2017-06-24 | End: 2017-06-24

## 2017-06-24 RX ORDER — AMOXICILLIN 500 MG/1
1000 CAPSULE ORAL 2 TIMES DAILY
Qty: 48 CAPSULE | Refills: 0 | Status: SHIPPED | OUTPATIENT
Start: 2017-06-24 | End: 2017-07-08

## 2017-06-24 RX ORDER — DIPHENHYDRAMINE HYDROCHLORIDE 50 MG/ML
25 INJECTION INTRAMUSCULAR; INTRAVENOUS ONCE
Status: COMPLETED | OUTPATIENT
Start: 2017-06-24 | End: 2017-06-24

## 2017-06-24 RX ORDER — ONDANSETRON 2 MG/ML
4 INJECTION INTRAMUSCULAR; INTRAVENOUS ONCE
Status: COMPLETED | OUTPATIENT
Start: 2017-06-24 | End: 2017-06-24

## 2017-06-24 RX ADMIN — DIPHENHYDRAMINE HCL 25 MG: 25 TABLET ORAL at 14:28

## 2017-06-24 RX ADMIN — ALUMINUM HYDROXIDE, MAGNESIUM HYDROXIDE, AND SIMETHICONE 30 ML: 200; 200; 20 SUSPENSION ORAL at 12:50

## 2017-06-24 RX ADMIN — ONDANSETRON 4 MG: 2 INJECTION INTRAMUSCULAR; INTRAVENOUS at 12:51

## 2017-06-24 RX ADMIN — SODIUM CHLORIDE 1000 ML: 9 INJECTION, SOLUTION INTRAVENOUS at 12:50

## 2017-06-24 RX ADMIN — DIPHENHYDRAMINE HYDROCHLORIDE 50 MG: 50 INJECTION, SOLUTION INTRAMUSCULAR; INTRAVENOUS at 12:52

## 2017-06-24 ASSESSMENT — ENCOUNTER SYMPTOMS
BLOOD IN STOOL: 0
CONSTIPATION: 0
NAUSEA: 1
SHORTNESS OF BREATH: 0
VOMITING: 1
SORE THROAT: 0
BACK PAIN: 0
ABDOMINAL PAIN: 1
DIARRHEA: 1
COUGH: 0

## 2017-08-07 ENCOUNTER — APPOINTMENT (OUTPATIENT)
Dept: GENERAL RADIOLOGY | Age: 27
DRG: 379 | End: 2017-08-07
Payer: COMMERCIAL

## 2017-08-07 ENCOUNTER — APPOINTMENT (OUTPATIENT)
Dept: ULTRASOUND IMAGING | Age: 27
DRG: 379 | End: 2017-08-07
Payer: COMMERCIAL

## 2017-08-07 ENCOUNTER — APPOINTMENT (OUTPATIENT)
Dept: CT IMAGING | Age: 27
DRG: 379 | End: 2017-08-07
Payer: COMMERCIAL

## 2017-08-07 ENCOUNTER — HOSPITAL ENCOUNTER (INPATIENT)
Age: 27
LOS: 2 days | Discharge: HOME | DRG: 379 | End: 2017-08-09
Attending: EMERGENCY MEDICINE | Admitting: INTERNAL MEDICINE
Payer: COMMERCIAL

## 2017-08-07 DIAGNOSIS — R10.84 GENERALIZED ABDOMINAL PAIN: ICD-10-CM

## 2017-08-07 DIAGNOSIS — D64.9 ANEMIA, UNSPECIFIED TYPE: Primary | ICD-10-CM

## 2017-08-07 DIAGNOSIS — R11.0 NAUSEA: ICD-10-CM

## 2017-08-07 LAB
-: NORMAL
ABSOLUTE EOS #: 0 K/UL (ref 0–0.4)
ABSOLUTE EOS #: 0.1 K/UL (ref 0–0.4)
ABSOLUTE LYMPH #: 1 K/UL (ref 1–4.8)
ABSOLUTE LYMPH #: 1.2 K/UL (ref 1–4.8)
ABSOLUTE MONO #: 0.2 K/UL (ref 0.1–1.2)
ABSOLUTE MONO #: 0.4 K/UL (ref 0.1–1.2)
ABSOLUTE RETIC #: 0.08 M/UL (ref 0.02–0.1)
ALBUMIN SERPL-MCNC: 4.4 G/DL (ref 3.5–5.2)
ALBUMIN/GLOBULIN RATIO: 1.4 (ref 1–2.5)
ALP BLD-CCNC: 69 U/L (ref 40–129)
ALT SERPL-CCNC: 107 U/L (ref 5–41)
AMORPHOUS: NORMAL
ANION GAP SERPL CALCULATED.3IONS-SCNC: 13 MMOL/L (ref 9–17)
AST SERPL-CCNC: 101 U/L
BACTERIA: NORMAL
BASOPHILS # BLD: 0 %
BASOPHILS # BLD: 2 %
BASOPHILS ABSOLUTE: 0 K/UL (ref 0–0.2)
BASOPHILS ABSOLUTE: 0.1 K/UL (ref 0–0.2)
BILIRUB SERPL-MCNC: 0.25 MG/DL (ref 0.3–1.2)
BILIRUBIN DIRECT: <0.08 MG/DL
BILIRUBIN URINE: NEGATIVE
BILIRUBIN, INDIRECT: ABNORMAL MG/DL (ref 0–1)
BUN BLDV-MCNC: 14 MG/DL (ref 6–20)
BUN/CREAT BLD: NORMAL (ref 9–20)
CALCIUM SERPL-MCNC: 9.3 MG/DL (ref 8.6–10.4)
CASTS UA: NORMAL /LPF (ref 0–2)
CHLORIDE BLD-SCNC: 100 MMOL/L (ref 98–107)
CO2: 25 MMOL/L (ref 20–31)
COLOR: YELLOW
CREAT SERPL-MCNC: 0.7 MG/DL (ref 0.7–1.2)
CRYSTALS, UA: NORMAL /HPF
DIFFERENTIAL TYPE: ABNORMAL
DIFFERENTIAL TYPE: ABNORMAL
EOSINOPHILS RELATIVE PERCENT: 0 %
EOSINOPHILS RELATIVE PERCENT: 2 %
EPITHELIAL CELLS UA: NORMAL /HPF (ref 0–5)
FERRITIN: 396 UG/L (ref 30–400)
GFR AFRICAN AMERICAN: >60 ML/MIN
GFR NON-AFRICAN AMERICAN: >60 ML/MIN
GFR SERPL CREATININE-BSD FRML MDRD: NORMAL ML/MIN/{1.73_M2}
GFR SERPL CREATININE-BSD FRML MDRD: NORMAL ML/MIN/{1.73_M2}
GLOBULIN: ABNORMAL G/DL (ref 1.5–3.8)
GLUCOSE BLD-MCNC: 95 MG/DL (ref 70–99)
GLUCOSE URINE: NEGATIVE
HCT VFR BLD CALC: 34 % (ref 41–53)
HCT VFR BLD CALC: 35.4 % (ref 41–53)
HEMOGLOBIN: 12.2 G/DL (ref 13.5–17.5)
HEMOGLOBIN: 8.7 G/DL (ref 13.5–17.5)
IRON SATURATION: 32 % (ref 20–55)
IRON: 106 UG/DL (ref 59–158)
KETONES, URINE: NEGATIVE
LACTIC ACID, WHOLE BLOOD: 3.4 MMOL/L (ref 0.7–2.1)
LEUKOCYTE ESTERASE, URINE: NEGATIVE
LIPASE: 30 U/L (ref 13–60)
LYMPHOCYTES # BLD: 10 %
LYMPHOCYTES # BLD: 29 %
MCH RBC QN AUTO: 21.4 PG (ref 26–34)
MCH RBC QN AUTO: 29.2 PG (ref 26–34)
MCHC RBC AUTO-ENTMCNC: 25.6 G/DL (ref 31–37)
MCHC RBC AUTO-ENTMCNC: 34.5 G/DL (ref 31–37)
MCV RBC AUTO: 83.8 FL (ref 80–100)
MCV RBC AUTO: 84.6 FL (ref 80–100)
MONOCYTES # BLD: 2 %
MONOCYTES # BLD: 9 %
MUCUS: NORMAL
NITRITE, URINE: NEGATIVE
OTHER OBSERVATIONS UA: NORMAL
PDW BLD-RTO: 13.6 % (ref 12.5–15.4)
PDW BLD-RTO: 13.9 % (ref 12.5–15.4)
PH UA: 7 (ref 5–8)
PLATELET # BLD: 220 K/UL (ref 140–450)
PLATELET # BLD: 260 K/UL (ref 140–450)
PLATELET ESTIMATE: ABNORMAL
PLATELET ESTIMATE: ABNORMAL
PMV BLD AUTO: 7.5 FL (ref 6–12)
PMV BLD AUTO: 7.7 FL (ref 6–12)
POTASSIUM SERPL-SCNC: 4.7 MMOL/L (ref 3.7–5.3)
PROTEIN UA: NEGATIVE
RBC # BLD: 4.06 M/UL (ref 4.5–5.9)
RBC # BLD: 4.18 M/UL (ref 4.5–5.9)
RBC # BLD: ABNORMAL 10*6/UL
RBC # BLD: ABNORMAL 10*6/UL
RBC UA: NORMAL /HPF (ref 0–2)
RENAL EPITHELIAL, UA: NORMAL /HPF
RETIC %: 1.9 % (ref 0.5–2)
SEG NEUTROPHILS: 58 %
SEG NEUTROPHILS: 88 %
SEGMENTED NEUTROPHILS ABSOLUTE COUNT: 2.3 K/UL (ref 1.8–7.7)
SEGMENTED NEUTROPHILS ABSOLUTE COUNT: 8.9 K/UL (ref 1.8–7.7)
SODIUM BLD-SCNC: 138 MMOL/L (ref 135–144)
SPECIFIC GRAVITY UA: 1.01 (ref 1–1.03)
TOTAL IRON BINDING CAPACITY: 332 UG/DL (ref 250–450)
TOTAL PROTEIN: 7.6 G/DL (ref 6.4–8.3)
TRANSFERRIN: 299 MG/DL (ref 200–360)
TRICHOMONAS: NORMAL
TURBIDITY: CLEAR
UNSATURATED IRON BINDING CAPACITY: 226 UG/DL (ref 112–347)
URINE HGB: NEGATIVE
UROBILINOGEN, URINE: NORMAL
WBC # BLD: 10.2 K/UL (ref 3.5–11)
WBC # BLD: 4 K/UL (ref 3.5–11)
WBC # BLD: ABNORMAL 10*3/UL
WBC # BLD: ABNORMAL 10*3/UL
WBC UA: NORMAL /HPF (ref 0–5)
YEAST: NORMAL

## 2017-08-07 PROCEDURE — 96374 THER/PROPH/DIAG INJ IV PUSH: CPT

## 2017-08-07 PROCEDURE — 83550 IRON BINDING TEST: CPT

## 2017-08-07 PROCEDURE — 83605 ASSAY OF LACTIC ACID: CPT

## 2017-08-07 PROCEDURE — 80048 BASIC METABOLIC PNL TOTAL CA: CPT

## 2017-08-07 PROCEDURE — 96376 TX/PRO/DX INJ SAME DRUG ADON: CPT

## 2017-08-07 PROCEDURE — 80076 HEPATIC FUNCTION PANEL: CPT

## 2017-08-07 PROCEDURE — 1200000000 HC SEMI PRIVATE

## 2017-08-07 PROCEDURE — 6360000002 HC RX W HCPCS: Performed by: HOSPITALIST

## 2017-08-07 PROCEDURE — 74177 CT ABD & PELVIS W/CONTRAST: CPT

## 2017-08-07 PROCEDURE — 85651 RBC SED RATE NONAUTOMATED: CPT

## 2017-08-07 PROCEDURE — 82728 ASSAY OF FERRITIN: CPT

## 2017-08-07 PROCEDURE — 87591 N.GONORRHOEAE DNA AMP PROB: CPT

## 2017-08-07 PROCEDURE — 85045 AUTOMATED RETICULOCYTE COUNT: CPT

## 2017-08-07 PROCEDURE — 85025 COMPLETE CBC W/AUTO DIFF WBC: CPT

## 2017-08-07 PROCEDURE — 83516 IMMUNOASSAY NONANTIBODY: CPT

## 2017-08-07 PROCEDURE — 36415 COLL VENOUS BLD VENIPUNCTURE: CPT

## 2017-08-07 PROCEDURE — 6360000004 HC RX CONTRAST MEDICATION: Performed by: EMERGENCY MEDICINE

## 2017-08-07 PROCEDURE — 2580000003 HC RX 258: Performed by: PHYSICIAN ASSISTANT

## 2017-08-07 PROCEDURE — 6370000000 HC RX 637 (ALT 250 FOR IP): Performed by: HOSPITALIST

## 2017-08-07 PROCEDURE — 81001 URINALYSIS AUTO W/SCOPE: CPT

## 2017-08-07 PROCEDURE — 6360000002 HC RX W HCPCS: Performed by: PHYSICIAN ASSISTANT

## 2017-08-07 PROCEDURE — 2580000003 HC RX 258: Performed by: HOSPITALIST

## 2017-08-07 PROCEDURE — 74022 RADEX COMPL AQT ABD SERIES: CPT

## 2017-08-07 PROCEDURE — 83690 ASSAY OF LIPASE: CPT

## 2017-08-07 PROCEDURE — 96375 TX/PRO/DX INJ NEW DRUG ADDON: CPT

## 2017-08-07 PROCEDURE — 99285 EMERGENCY DEPT VISIT HI MDM: CPT

## 2017-08-07 PROCEDURE — 84466 ASSAY OF TRANSFERRIN: CPT

## 2017-08-07 PROCEDURE — 83540 ASSAY OF IRON: CPT

## 2017-08-07 PROCEDURE — 87491 CHLMYD TRACH DNA AMP PROBE: CPT

## 2017-08-07 RX ORDER — FAMOTIDINE 20 MG/1
20 TABLET, FILM COATED ORAL 2 TIMES DAILY
Status: DISCONTINUED | OUTPATIENT
Start: 2017-08-07 | End: 2017-08-08

## 2017-08-07 RX ORDER — 0.9 % SODIUM CHLORIDE 0.9 %
1000 INTRAVENOUS SOLUTION INTRAVENOUS ONCE
Status: COMPLETED | OUTPATIENT
Start: 2017-08-07 | End: 2017-08-07

## 2017-08-07 RX ORDER — SODIUM CHLORIDE 0.9 % (FLUSH) 0.9 %
10 SYRINGE (ML) INJECTION EVERY 12 HOURS SCHEDULED
Status: DISCONTINUED | OUTPATIENT
Start: 2017-08-07 | End: 2017-08-09 | Stop reason: HOSPADM

## 2017-08-07 RX ORDER — METHYLPREDNISOLONE SODIUM SUCCINATE 40 MG/ML
40 INJECTION, POWDER, LYOPHILIZED, FOR SOLUTION INTRAMUSCULAR; INTRAVENOUS ONCE
Status: COMPLETED | OUTPATIENT
Start: 2017-08-07 | End: 2017-08-07

## 2017-08-07 RX ORDER — MONTELUKAST SODIUM 10 MG/1
10 TABLET ORAL NIGHTLY
Status: DISCONTINUED | OUTPATIENT
Start: 2017-08-07 | End: 2017-08-09 | Stop reason: HOSPADM

## 2017-08-07 RX ORDER — PROMETHAZINE HYDROCHLORIDE 25 MG/ML
12.5 INJECTION, SOLUTION INTRAMUSCULAR; INTRAVENOUS ONCE
Status: COMPLETED | OUTPATIENT
Start: 2017-08-07 | End: 2017-08-07

## 2017-08-07 RX ORDER — DIPHENHYDRAMINE HYDROCHLORIDE 50 MG/ML
12.5 INJECTION INTRAMUSCULAR; INTRAVENOUS ONCE
Status: COMPLETED | OUTPATIENT
Start: 2017-08-07 | End: 2017-08-07

## 2017-08-07 RX ORDER — ACETAMINOPHEN AND CODEINE PHOSPHATE 300; 30 MG/1; MG/1
1 TABLET ORAL 3 TIMES DAILY PRN
Status: DISCONTINUED | OUTPATIENT
Start: 2017-08-07 | End: 2017-08-09 | Stop reason: HOSPADM

## 2017-08-07 RX ORDER — ONDANSETRON 2 MG/ML
4 INJECTION INTRAMUSCULAR; INTRAVENOUS EVERY 6 HOURS PRN
Status: DISCONTINUED | OUTPATIENT
Start: 2017-08-07 | End: 2017-08-09 | Stop reason: HOSPADM

## 2017-08-07 RX ORDER — ONDANSETRON 2 MG/ML
4 INJECTION INTRAMUSCULAR; INTRAVENOUS ONCE
Status: DISCONTINUED | OUTPATIENT
Start: 2017-08-07 | End: 2017-08-07

## 2017-08-07 RX ORDER — DIPHENHYDRAMINE HYDROCHLORIDE 50 MG/ML
25 INJECTION INTRAMUSCULAR; INTRAVENOUS ONCE
Status: COMPLETED | OUTPATIENT
Start: 2017-08-07 | End: 2017-08-07

## 2017-08-07 RX ORDER — SODIUM CHLORIDE 9 MG/ML
INJECTION, SOLUTION INTRAVENOUS CONTINUOUS
Status: DISCONTINUED | OUTPATIENT
Start: 2017-08-07 | End: 2017-08-09 | Stop reason: HOSPADM

## 2017-08-07 RX ORDER — FLUTICASONE FUROATE AND VILANTEROL 200; 25 UG/1; UG/1
1 POWDER RESPIRATORY (INHALATION) DAILY
Status: DISCONTINUED | OUTPATIENT
Start: 2017-08-07 | End: 2017-08-07 | Stop reason: CLARIF

## 2017-08-07 RX ORDER — PROMETHAZINE HYDROCHLORIDE 25 MG/1
12.5 TABLET ORAL EVERY 8 HOURS PRN
Status: DISCONTINUED | OUTPATIENT
Start: 2017-08-07 | End: 2017-08-08

## 2017-08-07 RX ORDER — MORPHINE SULFATE 4 MG/ML
4 INJECTION, SOLUTION INTRAMUSCULAR; INTRAVENOUS ONCE
Status: COMPLETED | OUTPATIENT
Start: 2017-08-07 | End: 2017-08-07

## 2017-08-07 RX ORDER — SODIUM CHLORIDE 0.9 % (FLUSH) 0.9 %
10 SYRINGE (ML) INJECTION PRN
Status: DISCONTINUED | OUTPATIENT
Start: 2017-08-07 | End: 2017-08-09 | Stop reason: HOSPADM

## 2017-08-07 RX ORDER — ACETAMINOPHEN 325 MG/1
650 TABLET ORAL EVERY 4 HOURS PRN
Status: DISCONTINUED | OUTPATIENT
Start: 2017-08-07 | End: 2017-08-09 | Stop reason: HOSPADM

## 2017-08-07 RX ORDER — ALBUTEROL SULFATE 90 UG/1
2 AEROSOL, METERED RESPIRATORY (INHALATION) EVERY 6 HOURS PRN
Status: DISCONTINUED | OUTPATIENT
Start: 2017-08-07 | End: 2017-08-09 | Stop reason: HOSPADM

## 2017-08-07 RX ORDER — DIPHENHYDRAMINE HCL 25 MG
25 TABLET ORAL EVERY 8 HOURS PRN
Status: DISCONTINUED | OUTPATIENT
Start: 2017-08-07 | End: 2017-08-08

## 2017-08-07 RX ADMIN — DIPHENHYDRAMINE HYDROCHLORIDE 25 MG: 50 INJECTION, SOLUTION INTRAMUSCULAR; INTRAVENOUS at 14:06

## 2017-08-07 RX ADMIN — MORPHINE SULFATE 4 MG: 4 INJECTION, SOLUTION INTRAMUSCULAR; INTRAVENOUS at 14:08

## 2017-08-07 RX ADMIN — SODIUM CHLORIDE: 9 INJECTION, SOLUTION INTRAVENOUS at 22:21

## 2017-08-07 RX ADMIN — PROMETHAZINE HYDROCHLORIDE 12.5 MG: 25 INJECTION INTRAMUSCULAR; INTRAVENOUS at 10:51

## 2017-08-07 RX ADMIN — METHYLPREDNISOLONE SODIUM SUCCINATE 40 MG: 40 INJECTION, POWDER, FOR SOLUTION INTRAMUSCULAR; INTRAVENOUS at 10:38

## 2017-08-07 RX ADMIN — ACETAMINOPHEN AND CODEINE PHOSPHATE 1 TABLET: 30; 300 TABLET ORAL at 23:40

## 2017-08-07 RX ADMIN — PROMETHAZINE HYDROCHLORIDE 12.5 MG: 25 INJECTION INTRAMUSCULAR; INTRAVENOUS at 13:08

## 2017-08-07 RX ADMIN — SODIUM CHLORIDE 1000 ML: 9 INJECTION, SOLUTION INTRAVENOUS at 10:42

## 2017-08-07 RX ADMIN — ONDANSETRON 4 MG: 2 INJECTION INTRAMUSCULAR; INTRAVENOUS at 22:24

## 2017-08-07 RX ADMIN — DIPHENHYDRAMINE HYDROCHLORIDE 12.5 MG: 50 INJECTION, SOLUTION INTRAMUSCULAR; INTRAVENOUS at 15:32

## 2017-08-07 RX ADMIN — SODIUM CHLORIDE 1000 ML: 9 INJECTION, SOLUTION INTRAVENOUS at 14:06

## 2017-08-07 RX ADMIN — DIPHENHYDRAMINE HCL 25 MG: 25 TABLET ORAL at 20:39

## 2017-08-07 RX ADMIN — IOVERSOL 130 ML: 741 INJECTION INTRA-ARTERIAL; INTRAVENOUS at 15:08

## 2017-08-07 RX ADMIN — DIPHENHYDRAMINE HYDROCHLORIDE 12.5 MG: 50 INJECTION, SOLUTION INTRAMUSCULAR; INTRAVENOUS at 10:51

## 2017-08-07 ASSESSMENT — ENCOUNTER SYMPTOMS
ABDOMINAL PAIN: 1
NAUSEA: 1
SHORTNESS OF BREATH: 0
COLOR CHANGE: 0
BACK PAIN: 0
DIARRHEA: 0
BLOOD IN STOOL: 0
COUGH: 0
VOMITING: 1

## 2017-08-07 ASSESSMENT — PAIN SCALES - GENERAL
PAINLEVEL_OUTOF10: 8
PAINLEVEL_OUTOF10: 9

## 2017-08-07 ASSESSMENT — PAIN DESCRIPTION - ORIENTATION: ORIENTATION: MID

## 2017-08-07 ASSESSMENT — PAIN DESCRIPTION - DESCRIPTORS: DESCRIPTORS: SHARP;ACHING

## 2017-08-07 ASSESSMENT — PAIN DESCRIPTION - FREQUENCY: FREQUENCY: INTERMITTENT

## 2017-08-07 ASSESSMENT — PAIN DESCRIPTION - LOCATION: LOCATION: ABDOMEN

## 2017-08-07 ASSESSMENT — PAIN DESCRIPTION - ONSET: ONSET: ON-GOING

## 2017-08-08 ENCOUNTER — APPOINTMENT (OUTPATIENT)
Dept: ULTRASOUND IMAGING | Age: 27
DRG: 379 | End: 2017-08-08
Payer: COMMERCIAL

## 2017-08-08 PROBLEM — R74.8 ELEVATED LIVER ENZYMES: Status: ACTIVE | Noted: 2017-08-08

## 2017-08-08 LAB
ABSOLUTE EOS #: 0 K/UL (ref 0–0.4)
ABSOLUTE LYMPH #: 1.8 K/UL (ref 1–4.8)
ABSOLUTE MONO #: 0.7 K/UL (ref 0.1–1.2)
ALBUMIN SERPL-MCNC: 3.8 G/DL (ref 3.5–5.2)
ALBUMIN/GLOBULIN RATIO: 1.3 (ref 1–2.5)
ALP BLD-CCNC: 60 U/L (ref 40–129)
ALT SERPL-CCNC: 88 U/L (ref 5–41)
AMPHETAMINE SCREEN URINE: NEGATIVE
ANION GAP SERPL CALCULATED.3IONS-SCNC: 12 MMOL/L (ref 9–17)
AST SERPL-CCNC: 74 U/L
BARBITURATE SCREEN URINE: NEGATIVE
BASOPHILS # BLD: 0 %
BASOPHILS ABSOLUTE: 0 K/UL (ref 0–0.2)
BENZODIAZEPINE SCREEN, URINE: NEGATIVE
BILIRUB SERPL-MCNC: 0.17 MG/DL (ref 0.3–1.2)
BUN BLDV-MCNC: 11 MG/DL (ref 6–20)
BUN/CREAT BLD: ABNORMAL (ref 9–20)
BUPRENORPHINE URINE: ABNORMAL
CALCIUM SERPL-MCNC: 8.2 MG/DL (ref 8.6–10.4)
CANNABINOID SCREEN URINE: NEGATIVE
CHLORIDE BLD-SCNC: 99 MMOL/L (ref 98–107)
CO2: 25 MMOL/L (ref 20–31)
COCAINE METABOLITE, URINE: NEGATIVE
CREAT SERPL-MCNC: 0.62 MG/DL (ref 0.7–1.2)
DIFFERENTIAL TYPE: ABNORMAL
EOSINOPHILS RELATIVE PERCENT: 0 %
GFR AFRICAN AMERICAN: >60 ML/MIN
GFR NON-AFRICAN AMERICAN: >60 ML/MIN
GFR SERPL CREATININE-BSD FRML MDRD: ABNORMAL ML/MIN/{1.73_M2}
GFR SERPL CREATININE-BSD FRML MDRD: ABNORMAL ML/MIN/{1.73_M2}
GLUCOSE BLD-MCNC: 123 MG/DL (ref 70–99)
HCT VFR BLD CALC: 32.4 % (ref 41–53)
HEMOGLOBIN: 11 G/DL (ref 13.5–17.5)
LACTIC ACID, WHOLE BLOOD: 1.6 MMOL/L (ref 0.7–2.1)
LACTIC ACID, WHOLE BLOOD: 2.2 MMOL/L (ref 0.7–2.1)
LYMPHOCYTES # BLD: 20 %
MCH RBC QN AUTO: 29.4 PG (ref 26–34)
MCHC RBC AUTO-ENTMCNC: 33.9 G/DL (ref 31–37)
MCV RBC AUTO: 86.6 FL (ref 80–100)
MDMA URINE: ABNORMAL
METHADONE SCREEN, URINE: POSITIVE
METHAMPHETAMINE, URINE: ABNORMAL
MONOCYTES # BLD: 8 %
OPIATES, URINE: NEGATIVE
OXYCODONE SCREEN URINE: POSITIVE
PDW BLD-RTO: 13.8 % (ref 12.5–15.4)
PHENCYCLIDINE, URINE: NEGATIVE
PLATELET # BLD: 235 K/UL (ref 140–450)
PLATELET ESTIMATE: ABNORMAL
PMV BLD AUTO: 7.6 FL (ref 6–12)
POTASSIUM SERPL-SCNC: 4.3 MMOL/L (ref 3.7–5.3)
PROPOXYPHENE, URINE: ABNORMAL
RBC # BLD: 3.74 M/UL (ref 4.5–5.9)
RBC # BLD: ABNORMAL 10*6/UL
SEDIMENTATION RATE, ERYTHROCYTE: 27 MM (ref 0–10)
SEG NEUTROPHILS: 72 %
SEGMENTED NEUTROPHILS ABSOLUTE COUNT: 6.6 K/UL (ref 1.8–7.7)
SODIUM BLD-SCNC: 136 MMOL/L (ref 135–144)
SURGICAL PATHOLOGY REPORT: NORMAL
TEST INFORMATION: ABNORMAL
TISSUE TRANSGLUTAMINASE ANTIBODY IGG: <0.6 U/ML
TISSUE TRANSGLUTAMINASE IGA: 0.5 U/ML
TOTAL PROTEIN: 6.7 G/DL (ref 6.4–8.3)
TRICYCLIC ANTIDEPRESSANTS, UR: ABNORMAL
WBC # BLD: 9.2 K/UL (ref 3.5–11)
WBC # BLD: ABNORMAL 10*3/UL

## 2017-08-08 PROCEDURE — 83605 ASSAY OF LACTIC ACID: CPT

## 2017-08-08 PROCEDURE — 80053 COMPREHEN METABOLIC PANEL: CPT

## 2017-08-08 PROCEDURE — 2580000003 HC RX 258: Performed by: HOSPITALIST

## 2017-08-08 PROCEDURE — 6360000002 HC RX W HCPCS: Performed by: HOSPITALIST

## 2017-08-08 PROCEDURE — 1200000000 HC SEMI PRIVATE

## 2017-08-08 PROCEDURE — 99223 1ST HOSP IP/OBS HIGH 75: CPT | Performed by: INTERNAL MEDICINE

## 2017-08-08 PROCEDURE — 6370000000 HC RX 637 (ALT 250 FOR IP): Performed by: STUDENT IN AN ORGANIZED HEALTH CARE EDUCATION/TRAINING PROGRAM

## 2017-08-08 PROCEDURE — 94762 N-INVAS EAR/PLS OXIMTRY CONT: CPT

## 2017-08-08 PROCEDURE — 85025 COMPLETE CBC W/AUTO DIFF WBC: CPT

## 2017-08-08 PROCEDURE — 6360000002 HC RX W HCPCS: Performed by: STUDENT IN AN ORGANIZED HEALTH CARE EDUCATION/TRAINING PROGRAM

## 2017-08-08 PROCEDURE — 76705 ECHO EXAM OF ABDOMEN: CPT

## 2017-08-08 PROCEDURE — 6370000000 HC RX 637 (ALT 250 FOR IP): Performed by: HOSPITALIST

## 2017-08-08 PROCEDURE — 36415 COLL VENOUS BLD VENIPUNCTURE: CPT

## 2017-08-08 PROCEDURE — 80307 DRUG TEST PRSMV CHEM ANLYZR: CPT

## 2017-08-08 RX ORDER — METHADONE HYDROCHLORIDE 5 MG/5ML
170 SOLUTION ORAL DAILY
Status: DISCONTINUED | OUTPATIENT
Start: 2017-08-08 | End: 2017-08-09 | Stop reason: HOSPADM

## 2017-08-08 RX ORDER — DIPHENHYDRAMINE HYDROCHLORIDE 50 MG/ML
12.5 INJECTION INTRAMUSCULAR; INTRAVENOUS ONCE
Status: COMPLETED | OUTPATIENT
Start: 2017-08-08 | End: 2017-08-08

## 2017-08-08 RX ORDER — PANTOPRAZOLE SODIUM 40 MG/1
40 TABLET, DELAYED RELEASE ORAL
Status: DISCONTINUED | OUTPATIENT
Start: 2017-08-08 | End: 2017-08-09 | Stop reason: HOSPADM

## 2017-08-08 RX ORDER — METHADONE HYDROCHLORIDE 10 MG/1
30 TABLET ORAL DAILY
Status: ON HOLD | COMMUNITY
End: 2018-04-10 | Stop reason: ALTCHOICE

## 2017-08-08 RX ADMIN — ACETAMINOPHEN AND CODEINE PHOSPHATE 1 TABLET: 30; 300 TABLET ORAL at 08:18

## 2017-08-08 RX ADMIN — DIPHENHYDRAMINE HYDROCHLORIDE 12.5 MG: 50 INJECTION, SOLUTION INTRAMUSCULAR; INTRAVENOUS at 05:38

## 2017-08-08 RX ADMIN — ONDANSETRON 4 MG: 2 INJECTION INTRAMUSCULAR; INTRAVENOUS at 08:18

## 2017-08-08 RX ADMIN — DIPHENHYDRAMINE HCL 25 MG: 25 TABLET ORAL at 12:38

## 2017-08-08 RX ADMIN — POLYETHYLENE GLYCOL 3350, SODIUM SULFATE ANHYDROUS, SODIUM BICARBONATE, SODIUM CHLORIDE, POTASSIUM CHLORIDE 4000 ML: 236; 22.74; 6.74; 5.86; 2.97 POWDER, FOR SOLUTION ORAL at 14:55

## 2017-08-08 RX ADMIN — ONDANSETRON 4 MG: 2 INJECTION INTRAMUSCULAR; INTRAVENOUS at 21:30

## 2017-08-08 RX ADMIN — ONDANSETRON 4 MG: 2 INJECTION INTRAMUSCULAR; INTRAVENOUS at 16:06

## 2017-08-08 RX ADMIN — SODIUM CHLORIDE: 9 INJECTION, SOLUTION INTRAVENOUS at 21:30

## 2017-08-08 RX ADMIN — FAMOTIDINE 20 MG: 20 TABLET, FILM COATED ORAL at 10:13

## 2017-08-08 RX ADMIN — Medication 170 MG: at 13:20

## 2017-08-08 RX ADMIN — ENOXAPARIN SODIUM 40 MG: 40 INJECTION SUBCUTANEOUS at 10:13

## 2017-08-08 RX ADMIN — PANTOPRAZOLE SODIUM 40 MG: 40 TABLET, DELAYED RELEASE ORAL at 22:44

## 2017-08-08 ASSESSMENT — PAIN SCALES - GENERAL
PAINLEVEL_OUTOF10: 4
PAINLEVEL_OUTOF10: 8
PAINLEVEL_OUTOF10: 8

## 2017-08-09 ENCOUNTER — ANESTHESIA (OUTPATIENT)
Dept: ENDOSCOPY | Age: 27
DRG: 379 | End: 2017-08-09
Payer: COMMERCIAL

## 2017-08-09 ENCOUNTER — ANESTHESIA EVENT (OUTPATIENT)
Dept: ENDOSCOPY | Age: 27
DRG: 379 | End: 2017-08-09
Payer: COMMERCIAL

## 2017-08-09 VITALS
TEMPERATURE: 98 F | HEIGHT: 70 IN | DIASTOLIC BLOOD PRESSURE: 73 MMHG | RESPIRATION RATE: 18 BRPM | SYSTOLIC BLOOD PRESSURE: 130 MMHG | OXYGEN SATURATION: 97 % | HEART RATE: 70 BPM | WEIGHT: 250 LBS | BODY MASS INDEX: 35.79 KG/M2

## 2017-08-09 VITALS
RESPIRATION RATE: 24 BRPM | OXYGEN SATURATION: 98 % | DIASTOLIC BLOOD PRESSURE: 54 MMHG | SYSTOLIC BLOOD PRESSURE: 102 MMHG

## 2017-08-09 LAB — PATHOLOGIST REVIEW: NORMAL

## 2017-08-09 PROCEDURE — 2580000003 HC RX 258: Performed by: INTERNAL MEDICINE

## 2017-08-09 PROCEDURE — 6370000000 HC RX 637 (ALT 250 FOR IP): Performed by: INTERNAL MEDICINE

## 2017-08-09 PROCEDURE — 0DBE8ZX EXCISION OF LARGE INTESTINE, VIA NATURAL OR ARTIFICIAL OPENING ENDOSCOPIC, DIAGNOSTIC: ICD-10-PCS | Performed by: INTERNAL MEDICINE

## 2017-08-09 PROCEDURE — 3609017100 HC EGD: Performed by: INTERNAL MEDICINE

## 2017-08-09 PROCEDURE — 99239 HOSP IP/OBS DSCHRG MGMT >30: CPT | Performed by: INTERNAL MEDICINE

## 2017-08-09 PROCEDURE — 6360000002 HC RX W HCPCS: Performed by: ANESTHESIOLOGY

## 2017-08-09 PROCEDURE — 6360000002 HC RX W HCPCS: Performed by: HOSPITALIST

## 2017-08-09 PROCEDURE — 94762 N-INVAS EAR/PLS OXIMTRY CONT: CPT

## 2017-08-09 PROCEDURE — 2580000003 HC RX 258: Performed by: NURSE ANESTHETIST, CERTIFIED REGISTERED

## 2017-08-09 PROCEDURE — 2500000003 HC RX 250 WO HCPCS: Performed by: NURSE ANESTHETIST, CERTIFIED REGISTERED

## 2017-08-09 PROCEDURE — 3700000001 HC ADD 15 MINUTES (ANESTHESIA): Performed by: INTERNAL MEDICINE

## 2017-08-09 PROCEDURE — 2580000003 HC RX 258: Performed by: HOSPITALIST

## 2017-08-09 PROCEDURE — 7100000011 HC PHASE II RECOVERY - ADDTL 15 MIN: Performed by: INTERNAL MEDICINE

## 2017-08-09 PROCEDURE — 0DBP8ZX EXCISION OF RECTUM, VIA NATURAL OR ARTIFICIAL OPENING ENDOSCOPIC, DIAGNOSTIC: ICD-10-PCS | Performed by: INTERNAL MEDICINE

## 2017-08-09 PROCEDURE — 0DJ08ZZ INSPECTION OF UPPER INTESTINAL TRACT, VIA NATURAL OR ARTIFICIAL OPENING ENDOSCOPIC: ICD-10-PCS | Performed by: INTERNAL MEDICINE

## 2017-08-09 PROCEDURE — 88305 TISSUE EXAM BY PATHOLOGIST: CPT

## 2017-08-09 PROCEDURE — 6360000002 HC RX W HCPCS: Performed by: NURSE ANESTHETIST, CERTIFIED REGISTERED

## 2017-08-09 PROCEDURE — 6360000002 HC RX W HCPCS: Performed by: INTERNAL MEDICINE

## 2017-08-09 PROCEDURE — 3700000000 HC ANESTHESIA ATTENDED CARE: Performed by: INTERNAL MEDICINE

## 2017-08-09 PROCEDURE — 6370000000 HC RX 637 (ALT 250 FOR IP): Performed by: HOSPITALIST

## 2017-08-09 PROCEDURE — 7100000010 HC PHASE II RECOVERY - FIRST 15 MIN: Performed by: INTERNAL MEDICINE

## 2017-08-09 PROCEDURE — 3609010300 HC COLONOSCOPY W/BIOPSY SINGLE/MULTIPLE: Performed by: INTERNAL MEDICINE

## 2017-08-09 RX ORDER — LIDOCAINE HYDROCHLORIDE 10 MG/ML
INJECTION, SOLUTION EPIDURAL; INFILTRATION; INTRACAUDAL; PERINEURAL PRN
Status: DISCONTINUED | OUTPATIENT
Start: 2017-08-09 | End: 2017-08-09 | Stop reason: SDUPTHER

## 2017-08-09 RX ORDER — PROPOFOL 10 MG/ML
INJECTION, EMULSION INTRAVENOUS PRN
Status: DISCONTINUED | OUTPATIENT
Start: 2017-08-09 | End: 2017-08-09 | Stop reason: SDUPTHER

## 2017-08-09 RX ORDER — ONDANSETRON 2 MG/ML
4 INJECTION INTRAMUSCULAR; INTRAVENOUS ONCE
Status: COMPLETED | OUTPATIENT
Start: 2017-08-09 | End: 2017-08-09

## 2017-08-09 RX ORDER — SODIUM CHLORIDE 9 MG/ML
INJECTION, SOLUTION INTRAVENOUS CONTINUOUS PRN
Status: DISCONTINUED | OUTPATIENT
Start: 2017-08-09 | End: 2017-08-09 | Stop reason: SDUPTHER

## 2017-08-09 RX ADMIN — SODIUM CHLORIDE: 9 INJECTION, SOLUTION INTRAVENOUS at 08:43

## 2017-08-09 RX ADMIN — ONDANSETRON 4 MG: 2 INJECTION INTRAMUSCULAR; INTRAVENOUS at 10:16

## 2017-08-09 RX ADMIN — Medication 10 ML: at 10:39

## 2017-08-09 RX ADMIN — MOMETASONE FUROATE AND FORMOTEROL FUMARATE DIHYDRATE 2 PUFF: 200; 5 AEROSOL RESPIRATORY (INHALATION) at 07:35

## 2017-08-09 RX ADMIN — SODIUM CHLORIDE: 9 INJECTION, SOLUTION INTRAVENOUS at 06:37

## 2017-08-09 RX ADMIN — PROPOFOL 550 MG: 10 INJECTION, EMULSION INTRAVENOUS at 08:47

## 2017-08-09 RX ADMIN — Medication 170 MG: at 11:08

## 2017-08-09 RX ADMIN — SODIUM CHLORIDE: 9 INJECTION, SOLUTION INTRAVENOUS at 10:39

## 2017-08-09 RX ADMIN — ONDANSETRON 4 MG: 2 INJECTION, SOLUTION INTRAMUSCULAR; INTRAVENOUS at 08:42

## 2017-08-09 RX ADMIN — LIDOCAINE HYDROCHLORIDE 50 MG: 10 INJECTION, SOLUTION EPIDURAL; INFILTRATION; INTRACAUDAL; PERINEURAL at 08:47

## 2017-08-09 RX ADMIN — ONDANSETRON 4 MG: 2 INJECTION INTRAMUSCULAR; INTRAVENOUS at 03:53

## 2017-08-09 RX ADMIN — ONDANSETRON 4 MG: 2 INJECTION INTRAMUSCULAR; INTRAVENOUS at 16:20

## 2017-08-09 RX ADMIN — PANTOPRAZOLE SODIUM 40 MG: 40 TABLET, DELAYED RELEASE ORAL at 10:36

## 2017-08-09 ASSESSMENT — PAIN SCALES - GENERAL
PAINLEVEL_OUTOF10: 0
PAINLEVEL_OUTOF10: 6
PAINLEVEL_OUTOF10: 8
PAINLEVEL_OUTOF10: 8

## 2017-08-09 ASSESSMENT — PAIN DESCRIPTION - PAIN TYPE: TYPE: CHRONIC PAIN

## 2017-08-09 ASSESSMENT — PAIN DESCRIPTION - LOCATION: LOCATION: HEAD

## 2017-08-09 ASSESSMENT — PAIN DESCRIPTION - DESCRIPTORS: DESCRIPTORS: THROBBING

## 2017-08-09 ASSESSMENT — PAIN DESCRIPTION - FREQUENCY: FREQUENCY: INTERMITTENT

## 2017-08-09 ASSESSMENT — ENCOUNTER SYMPTOMS
STRIDOR: 0
SHORTNESS OF BREATH: 0

## 2017-08-09 ASSESSMENT — PAIN - FUNCTIONAL ASSESSMENT: PAIN_FUNCTIONAL_ASSESSMENT: 0-10

## 2017-08-10 LAB — SURGICAL PATHOLOGY REPORT: NORMAL

## 2017-09-16 ENCOUNTER — HOSPITAL ENCOUNTER (EMERGENCY)
Age: 27
Discharge: HOME | End: 2017-09-16
Attending: EMERGENCY MEDICINE
Payer: COMMERCIAL

## 2017-09-16 VITALS
TEMPERATURE: 98.2 F | HEIGHT: 69 IN | WEIGHT: 255 LBS | RESPIRATION RATE: 18 BRPM | DIASTOLIC BLOOD PRESSURE: 86 MMHG | BODY MASS INDEX: 37.77 KG/M2 | OXYGEN SATURATION: 95 % | HEART RATE: 103 BPM | SYSTOLIC BLOOD PRESSURE: 143 MMHG

## 2017-09-16 DIAGNOSIS — R10.32 LEFT LOWER QUADRANT PAIN: Primary | ICD-10-CM

## 2017-09-16 LAB
ABSOLUTE EOS #: 0.1 K/UL (ref 0–0.4)
ABSOLUTE LYMPH #: 2.2 K/UL (ref 1–4.8)
ABSOLUTE MONO #: 0.5 K/UL (ref 0.1–1.2)
BASOPHILS # BLD: 0 %
BASOPHILS ABSOLUTE: 0 K/UL (ref 0–0.2)
DIFFERENTIAL TYPE: ABNORMAL
EOSINOPHILS RELATIVE PERCENT: 2 %
HCT VFR BLD CALC: 32.5 % (ref 41–53)
HEMOGLOBIN: 11.1 G/DL (ref 13.5–17.5)
LIPASE: 36 U/L (ref 13–60)
LYMPHOCYTES # BLD: 38 %
MCH RBC QN AUTO: 29.5 PG (ref 26–34)
MCHC RBC AUTO-ENTMCNC: 34.1 G/DL (ref 31–37)
MCV RBC AUTO: 86.6 FL (ref 80–100)
MONOCYTES # BLD: 9 %
PDW BLD-RTO: 14.8 % (ref 12.5–15.4)
PLATELET # BLD: 241 K/UL (ref 140–450)
PLATELET ESTIMATE: ABNORMAL
PMV BLD AUTO: 7.6 FL (ref 6–12)
RBC # BLD: 3.74 M/UL (ref 4.5–5.9)
RBC # BLD: ABNORMAL 10*6/UL
SEG NEUTROPHILS: 51 %
SEGMENTED NEUTROPHILS ABSOLUTE COUNT: 2.9 K/UL (ref 1.8–7.7)
WBC # BLD: 5.8 K/UL (ref 3.5–11)
WBC # BLD: ABNORMAL 10*3/UL

## 2017-09-16 PROCEDURE — G0383 LEV 4 HOSP TYPE B ED VISIT: HCPCS

## 2017-09-16 PROCEDURE — 93005 ELECTROCARDIOGRAM TRACING: CPT

## 2017-09-16 PROCEDURE — 85025 COMPLETE CBC W/AUTO DIFF WBC: CPT

## 2017-09-16 PROCEDURE — 96374 THER/PROPH/DIAG INJ IV PUSH: CPT

## 2017-09-16 PROCEDURE — 2580000003 HC RX 258: Performed by: NURSE PRACTITIONER

## 2017-09-16 PROCEDURE — 83690 ASSAY OF LIPASE: CPT

## 2017-09-16 PROCEDURE — 6370000000 HC RX 637 (ALT 250 FOR IP): Performed by: NURSE PRACTITIONER

## 2017-09-16 PROCEDURE — 96375 TX/PRO/DX INJ NEW DRUG ADDON: CPT

## 2017-09-16 PROCEDURE — 6360000002 HC RX W HCPCS: Performed by: NURSE PRACTITIONER

## 2017-09-16 RX ORDER — DOCUSATE SODIUM 100 MG/1
100 CAPSULE, LIQUID FILLED ORAL 2 TIMES DAILY
Qty: 14 CAPSULE | Refills: 0 | Status: ON HOLD | OUTPATIENT
Start: 2017-09-16 | End: 2018-04-10 | Stop reason: ALTCHOICE

## 2017-09-16 RX ORDER — 0.9 % SODIUM CHLORIDE 0.9 %
500 INTRAVENOUS SOLUTION INTRAVENOUS ONCE
Status: COMPLETED | OUTPATIENT
Start: 2017-09-16 | End: 2017-09-16

## 2017-09-16 RX ORDER — DIPHENHYDRAMINE HYDROCHLORIDE 50 MG/ML
25 INJECTION INTRAMUSCULAR; INTRAVENOUS ONCE
Status: COMPLETED | OUTPATIENT
Start: 2017-09-16 | End: 2017-09-16

## 2017-09-16 RX ORDER — DIPHENHYDRAMINE HCL 25 MG
25 CAPSULE ORAL EVERY 6 HOURS PRN
Qty: 10 CAPSULE | Refills: 0 | Status: SHIPPED | OUTPATIENT
Start: 2017-09-16 | End: 2017-11-09

## 2017-09-16 RX ORDER — PROMETHAZINE HYDROCHLORIDE 25 MG/1
12.5 TABLET ORAL EVERY 6 HOURS PRN
Qty: 10 TABLET | Refills: 0 | Status: SHIPPED | OUTPATIENT
Start: 2017-09-16 | End: 2017-09-23

## 2017-09-16 RX ORDER — BISACODYL 10 MG
10 SUPPOSITORY, RECTAL RECTAL DAILY PRN
Status: DISCONTINUED | OUTPATIENT
Start: 2017-09-16 | End: 2017-09-16 | Stop reason: HOSPADM

## 2017-09-16 RX ORDER — ONDANSETRON 2 MG/ML
4 INJECTION INTRAMUSCULAR; INTRAVENOUS ONCE
Status: COMPLETED | OUTPATIENT
Start: 2017-09-16 | End: 2017-09-16

## 2017-09-16 RX ADMIN — DIPHENHYDRAMINE HYDROCHLORIDE 25 MG: 50 INJECTION, SOLUTION INTRAMUSCULAR; INTRAVENOUS at 19:02

## 2017-09-16 RX ADMIN — SODIUM CHLORIDE 500 ML: 9 INJECTION, SOLUTION INTRAVENOUS at 19:01

## 2017-09-16 RX ADMIN — ONDANSETRON 4 MG: 2 INJECTION INTRAMUSCULAR; INTRAVENOUS at 19:02

## 2017-09-16 RX ADMIN — BISACODYL 10 MG: 10 SUPPOSITORY RECTAL at 19:02

## 2017-09-16 ASSESSMENT — ENCOUNTER SYMPTOMS
BLOOD IN STOOL: 1
NAUSEA: 1
ABDOMINAL PAIN: 1
PHOTOPHOBIA: 1
VOMITING: 1

## 2017-09-16 ASSESSMENT — PAIN DESCRIPTION - DESCRIPTORS: DESCRIPTORS: SHARP

## 2017-09-16 ASSESSMENT — PAIN SCALES - GENERAL: PAINLEVEL_OUTOF10: 8

## 2017-09-16 ASSESSMENT — PAIN DESCRIPTION - LOCATION: LOCATION: ABDOMEN

## 2017-09-18 ENCOUNTER — HOSPITAL ENCOUNTER (EMERGENCY)
Age: 27
Discharge: HOME | End: 2017-09-18
Attending: EMERGENCY MEDICINE
Payer: COMMERCIAL

## 2017-09-18 VITALS
TEMPERATURE: 97.5 F | WEIGHT: 255 LBS | HEART RATE: 94 BPM | BODY MASS INDEX: 37.66 KG/M2 | OXYGEN SATURATION: 98 % | RESPIRATION RATE: 16 BRPM | SYSTOLIC BLOOD PRESSURE: 173 MMHG | DIASTOLIC BLOOD PRESSURE: 98 MMHG

## 2017-09-18 DIAGNOSIS — M79.604 PAIN IN BOTH LOWER EXTREMITIES: Primary | ICD-10-CM

## 2017-09-18 DIAGNOSIS — M79.605 PAIN IN BOTH LOWER EXTREMITIES: Primary | ICD-10-CM

## 2017-09-18 DIAGNOSIS — R10.13 EPIGASTRIC PAIN: ICD-10-CM

## 2017-09-18 PROCEDURE — G0382 LEV 3 HOSP TYPE B ED VISIT: HCPCS

## 2017-09-18 RX ORDER — DIAPER,BRIEF,INFANT-TODD,DISP
EACH MISCELLANEOUS
Qty: 1 TUBE | Refills: 1 | Status: SHIPPED | OUTPATIENT
Start: 2017-09-18 | End: 2017-09-25

## 2017-09-18 RX ORDER — DEXAMETHASONE 4 MG/1
10 TABLET ORAL ONCE
Status: DISCONTINUED | OUTPATIENT
Start: 2017-09-18 | End: 2017-09-19 | Stop reason: HOSPADM

## 2017-09-18 RX ORDER — ACETAMINOPHEN 500 MG
1000 TABLET ORAL ONCE
Status: DISCONTINUED | OUTPATIENT
Start: 2017-09-18 | End: 2017-09-19 | Stop reason: HOSPADM

## 2017-09-18 RX ORDER — ACETAMINOPHEN 500 MG
1000 TABLET ORAL EVERY 6 HOURS PRN
Qty: 20 TABLET | Refills: 0 | Status: SHIPPED | OUTPATIENT
Start: 2017-09-18 | End: 2018-03-21

## 2017-09-18 RX ORDER — ONDANSETRON 4 MG/1
4 TABLET, FILM COATED ORAL ONCE
Status: DISCONTINUED | OUTPATIENT
Start: 2017-09-18 | End: 2017-09-19 | Stop reason: HOSPADM

## 2017-09-18 ASSESSMENT — PAIN SCALES - GENERAL: PAINLEVEL_OUTOF10: 10

## 2017-09-19 ASSESSMENT — ENCOUNTER SYMPTOMS
SINUS PRESSURE: 0
CONSTIPATION: 0
DIARRHEA: 0
SHORTNESS OF BREATH: 0
BLOOD IN STOOL: 1
VOMITING: 1
NAUSEA: 1
RHINORRHEA: 0
PHOTOPHOBIA: 0
SORE THROAT: 0
ABDOMINAL PAIN: 1
COUGH: 0
BACK PAIN: 1

## 2017-09-20 LAB
EKG ATRIAL RATE: 92 BPM
EKG P AXIS: 51 DEGREES
EKG P-R INTERVAL: 150 MS
EKG Q-T INTERVAL: 370 MS
EKG QRS DURATION: 76 MS
EKG QTC CALCULATION (BAZETT): 457 MS
EKG R AXIS: 40 DEGREES
EKG T AXIS: 20 DEGREES
EKG VENTRICULAR RATE: 92 BPM

## 2017-10-11 ENCOUNTER — APPOINTMENT (OUTPATIENT)
Dept: CT IMAGING | Age: 27
End: 2017-10-11
Payer: COMMERCIAL

## 2017-10-11 ENCOUNTER — HOSPITAL ENCOUNTER (EMERGENCY)
Age: 27
Discharge: HOME | End: 2017-10-11
Attending: EMERGENCY MEDICINE
Payer: COMMERCIAL

## 2017-10-11 VITALS
SYSTOLIC BLOOD PRESSURE: 151 MMHG | HEIGHT: 69 IN | BODY MASS INDEX: 37.03 KG/M2 | RESPIRATION RATE: 20 BRPM | HEART RATE: 101 BPM | TEMPERATURE: 98.6 F | WEIGHT: 250 LBS | OXYGEN SATURATION: 95 % | DIASTOLIC BLOOD PRESSURE: 92 MMHG

## 2017-10-11 DIAGNOSIS — R10.32 LEFT LOWER QUADRANT PAIN: Primary | ICD-10-CM

## 2017-10-11 LAB
ABSOLUTE EOS #: 0.1 K/UL (ref 0–0.4)
ABSOLUTE LYMPH #: 2.2 K/UL (ref 1–4.8)
ABSOLUTE MONO #: 0.4 K/UL (ref 0.1–1.2)
ALBUMIN SERPL-MCNC: 4.5 G/DL (ref 3.5–5.2)
ALBUMIN/GLOBULIN RATIO: 1.2 (ref 1–2.5)
ALP BLD-CCNC: 82 U/L (ref 40–129)
ALT SERPL-CCNC: 134 U/L (ref 5–41)
ANION GAP SERPL CALCULATED.3IONS-SCNC: 14 MMOL/L (ref 9–17)
AST SERPL-CCNC: 116 U/L
BASOPHILS # BLD: 1 %
BASOPHILS ABSOLUTE: 0 K/UL (ref 0–0.2)
BILIRUB SERPL-MCNC: 0.2 MG/DL (ref 0.3–1.2)
BILIRUBIN DIRECT: <0.08 MG/DL
BILIRUBIN, INDIRECT: ABNORMAL MG/DL (ref 0–1)
BUN BLDV-MCNC: 12 MG/DL (ref 6–20)
BUN/CREAT BLD: ABNORMAL (ref 9–20)
CALCIUM SERPL-MCNC: 8.7 MG/DL (ref 8.6–10.4)
CHLORIDE BLD-SCNC: 97 MMOL/L (ref 98–107)
CO2: 26 MMOL/L (ref 20–31)
CREAT SERPL-MCNC: 0.74 MG/DL (ref 0.7–1.2)
DIFFERENTIAL TYPE: ABNORMAL
EOSINOPHILS RELATIVE PERCENT: 1 %
GFR AFRICAN AMERICAN: >60 ML/MIN
GFR NON-AFRICAN AMERICAN: >60 ML/MIN
GFR SERPL CREATININE-BSD FRML MDRD: ABNORMAL ML/MIN/{1.73_M2}
GFR SERPL CREATININE-BSD FRML MDRD: ABNORMAL ML/MIN/{1.73_M2}
GLOBULIN: ABNORMAL G/DL (ref 1.5–3.8)
GLUCOSE BLD-MCNC: 107 MG/DL (ref 70–99)
HCT VFR BLD CALC: 38.3 % (ref 41–53)
HEMOGLOBIN: 12.8 G/DL (ref 13.5–17.5)
LIPASE: 41 U/L (ref 13–60)
LYMPHOCYTES # BLD: 34 %
MCH RBC QN AUTO: 29.7 PG (ref 26–34)
MCHC RBC AUTO-ENTMCNC: 33.5 G/DL (ref 31–37)
MCV RBC AUTO: 88.8 FL (ref 80–100)
MONOCYTES # BLD: 6 %
PDW BLD-RTO: 15.3 % (ref 12.5–15.4)
PLATELET # BLD: 260 K/UL (ref 140–450)
PLATELET ESTIMATE: ABNORMAL
PMV BLD AUTO: 8.5 FL (ref 6–12)
POTASSIUM SERPL-SCNC: 3.8 MMOL/L (ref 3.7–5.3)
RBC # BLD: 4.31 M/UL (ref 4.5–5.9)
RBC # BLD: ABNORMAL 10*6/UL
SEG NEUTROPHILS: 58 %
SEGMENTED NEUTROPHILS ABSOLUTE COUNT: 3.8 K/UL (ref 1.8–7.7)
SODIUM BLD-SCNC: 137 MMOL/L (ref 135–144)
TOTAL PROTEIN: 8.2 G/DL (ref 6.4–8.3)
WBC # BLD: 6.5 K/UL (ref 3.5–11)
WBC # BLD: ABNORMAL 10*3/UL

## 2017-10-11 PROCEDURE — 80048 BASIC METABOLIC PNL TOTAL CA: CPT

## 2017-10-11 PROCEDURE — 6360000002 HC RX W HCPCS: Performed by: EMERGENCY MEDICINE

## 2017-10-11 PROCEDURE — 99284 EMERGENCY DEPT VISIT MOD MDM: CPT

## 2017-10-11 PROCEDURE — 96374 THER/PROPH/DIAG INJ IV PUSH: CPT

## 2017-10-11 PROCEDURE — 2580000003 HC RX 258: Performed by: EMERGENCY MEDICINE

## 2017-10-11 PROCEDURE — 96361 HYDRATE IV INFUSION ADD-ON: CPT

## 2017-10-11 PROCEDURE — 74176 CT ABD & PELVIS W/O CONTRAST: CPT

## 2017-10-11 PROCEDURE — 96375 TX/PRO/DX INJ NEW DRUG ADDON: CPT

## 2017-10-11 PROCEDURE — 80076 HEPATIC FUNCTION PANEL: CPT

## 2017-10-11 PROCEDURE — 83690 ASSAY OF LIPASE: CPT

## 2017-10-11 PROCEDURE — 85025 COMPLETE CBC W/AUTO DIFF WBC: CPT

## 2017-10-11 PROCEDURE — 6360000002 HC RX W HCPCS

## 2017-10-11 RX ORDER — MORPHINE SULFATE 4 MG/ML
4 INJECTION, SOLUTION INTRAMUSCULAR; INTRAVENOUS ONCE
Status: DISCONTINUED | OUTPATIENT
Start: 2017-10-11 | End: 2017-10-11 | Stop reason: HOSPADM

## 2017-10-11 RX ORDER — MORPHINE SULFATE 2 MG/ML
INJECTION, SOLUTION INTRAMUSCULAR; INTRAVENOUS
Status: COMPLETED
Start: 2017-10-11 | End: 2017-10-11

## 2017-10-11 RX ORDER — DICYCLOMINE HYDROCHLORIDE 10 MG/1
10 CAPSULE ORAL EVERY 6 HOURS PRN
Qty: 20 CAPSULE | Refills: 0 | Status: ON HOLD | OUTPATIENT
Start: 2017-10-11 | End: 2017-12-14 | Stop reason: ALTCHOICE

## 2017-10-11 RX ORDER — 0.9 % SODIUM CHLORIDE 0.9 %
1000 INTRAVENOUS SOLUTION INTRAVENOUS ONCE
Status: COMPLETED | OUTPATIENT
Start: 2017-10-11 | End: 2017-10-11

## 2017-10-11 RX ORDER — DIPHENHYDRAMINE HYDROCHLORIDE 50 MG/ML
25 INJECTION INTRAMUSCULAR; INTRAVENOUS ONCE
Status: COMPLETED | OUTPATIENT
Start: 2017-10-11 | End: 2017-10-11

## 2017-10-11 RX ORDER — ONDANSETRON 2 MG/ML
4 INJECTION INTRAMUSCULAR; INTRAVENOUS ONCE
Status: COMPLETED | OUTPATIENT
Start: 2017-10-11 | End: 2017-10-11

## 2017-10-11 RX ADMIN — SODIUM CHLORIDE 1000 ML: 9 INJECTION, SOLUTION INTRAVENOUS at 16:39

## 2017-10-11 RX ADMIN — DIPHENHYDRAMINE HYDROCHLORIDE 25 MG: 50 INJECTION, SOLUTION INTRAMUSCULAR; INTRAVENOUS at 17:41

## 2017-10-11 RX ADMIN — ONDANSETRON 4 MG: 2 INJECTION, SOLUTION INTRAMUSCULAR; INTRAVENOUS at 16:40

## 2017-10-11 RX ADMIN — MORPHINE SULFATE 4 MG: 2 INJECTION, SOLUTION INTRAMUSCULAR; INTRAVENOUS at 16:41

## 2017-10-11 ASSESSMENT — ENCOUNTER SYMPTOMS
NAUSEA: 1
DIARRHEA: 1
SORE THROAT: 0
VOMITING: 1
CONSTIPATION: 0
RHINORRHEA: 0
COUGH: 0
BLOOD IN STOOL: 1
TROUBLE SWALLOWING: 0
ABDOMINAL PAIN: 1

## 2017-10-11 ASSESSMENT — PAIN SCALES - GENERAL
PAINLEVEL_OUTOF10: 9
PAINLEVEL_OUTOF10: 9

## 2017-10-11 ASSESSMENT — PAIN DESCRIPTION - DESCRIPTORS: DESCRIPTORS: PENETRATING;STABBING

## 2017-10-11 ASSESSMENT — PAIN DESCRIPTION - LOCATION: LOCATION: ABDOMEN

## 2017-10-11 ASSESSMENT — PAIN DESCRIPTION - ORIENTATION: ORIENTATION: LEFT

## 2017-10-11 ASSESSMENT — PAIN DESCRIPTION - PAIN TYPE: TYPE: ACUTE PAIN

## 2017-10-11 NOTE — ED PROVIDER NOTES
gender, they shall be construed as though they were used in the gender appropriate to the circumstances; and whenever words are used in this note in the singular or plural form, they shall be construed as though they were used in the form appropriate to the circumstances.)    MD Nubia Harper  Attending Emergency Medicine Physician            Juan Liu MD  10/11/17 5927

## 2017-10-11 NOTE — ED PROVIDER NOTES
[facility] ED  Emergency Department Encounter  Emergency Medicine Resident     Pt Name: Kamila Mcclendon   MRN: [id number]  [place] 1990  Date of evaluation: 10/11/17  PCP:  Thom Cyr MD    [address] [place] [address]       Chief Complaint   Patient presents with    Abdominal Pain    Fever    Nausea & Vomiting       HISTORY OF PRESENT ILLNESS  (Location/Symptom, Timing/Onset, Context/Setting, Quality, Duration, Modifying Factors, Severity.)      Kamila Mcclendon is a 32 y.o. male who presents with c/o LLQ abd pain x2 days, atraumatic, no hx similar symptoms. Hx constipation/GI issues. Cholecystectomy 2wks ago. No fevers/sweat/chills. Nausea and vomiting - 'got dark over past day.'  States stool has been red. No migration of pain. PAST MEDICAL / SURGICAL / SOCIAL / FAMILY HISTORY      has a past medical history of Anxiety; Arthritis; Bipolar I disorder, most recent episode (or current) depressed, unspecified; Clostridium difficile infection; COPD (chronic obstructive pulmonary disease) ([address] Depression; Eczema; Fracture, metacarpal; Gastric ulcer; GERD (gastroesophageal reflux disease); GI bleed; H/O blood clots; Head injury; Headache; Insomnia; Juvenile rheumatoid arthritis ([address] PFAPA syndrome ([address] PUD (peptic ulcer disease); Rheumatoid arthritis ([address] Rheumatoid arthritis ([address] Sleep apnea; 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[address] Substance abuse; Suicidal ideation; Suicide attempt by hanging ([address] Tobacco dependence; and Ulcerative colitis ([address] has a past surgical history that includes Colonoscopy; bronchoscopy; other surgical history; Abdomen surgery; Upper gastrointestinal endoscopy (2/4/16); egd transoral biopsy single/multiple (N/A, 3/20/2017); sigmoidoscopy (N/A, 3/20/2017); Cholecystectomy, laparoscopic (07/14/2017); esophagogastroduodenoscopy transoral diagnostic (N/A, 8/9/2017); and colonoscopy w/biopsy single/multiple (8/9/2017).       Social History     Social History    (DOLOPHINE) 10 MG tablet Take 170 mg by mouth daily  Dose verified with [facility] on [facility] . Historical Provider, MD   Meloxicam (MOBIC PO) Take 15 mg by mouth daily    Historical Provider, MD   sucralfate (CARAFATE) 1 GM/10ML suspension Take 10 mLs by mouth 4 times daily 5/31/17   Jerry Roberto MD   acetaminophen-codeine (TYLENOL/CODEINE #3) 300-30 MG per tablet Take 1 tablet by mouth 3 times daily as needed for Pain 5/12/17   Refugia Adas, DO   pantoprazole (PROTONIX) 40 MG tablet Take 1 tablet by mouth daily 5/1/17   Rodger Bartholomew MD   Skin Protectants, Misc. (EUCERIN) cream Apply topically as needed. 4/24/17   Angelique Amezquita,    gabapentin (NEURONTIN) 400 MG capsule Take 400 mg by mouth 2 times daily    Historical Provider, MD   vitamin D (ERGOCALCIFEROL) 95301 UNITS CAPS capsule Take 1 capsule by mouth once a week 1/5/17   Ewa Courtney MD   Fluticasone Furoate-Vilanterol (BREO ELLIPTA) 200-25 MCG/INH AEPB Inhale 1 puff into the lungs daily 12/16/16   Randi Anderson MD   albuterol sulfate HFA (VENTOLIN HFA) 108 (90 BASE) MCG/ACT inhaler Inhale 2 puffs into the lungs every 6 hours as needed for Wheezing 12/13/16   Kilo Orourke MD   montelukast (SINGULAIR) 10 MG tablet Take 1 tablet by mouth nightly 12/13/16   Randi Anderson MD       REVIEW OF SYSTEMS    (2-9 systems for level 4, 10 or more for level 5)      Review of Systems   Constitutional: Positive for appetite change. Negative for chills. HENT: Negative for rhinorrhea, sore throat and trouble swallowing. Respiratory: Negative for cough. Cardiovascular: Negative for chest pain. Gastrointestinal: Positive for abdominal pain, blood in stool, diarrhea, nausea and vomiting. Negative for constipation. Genitourinary: Negative for difficulty urinating and dysuria. Musculoskeletal: Negative for neck pain. Neurological: Negative for headaches.        PHYSICAL EXAM   (up to 7 for level 4, 8 or more for level 5)      INITIAL VITALS: BP (!) 151/92   Pulse 101   Temp 98.6 °F (37 °C) (Oral)   Resp 20   Ht 5' 9\" (1.753 m)   Wt 250 lb (113.4 kg)   SpO2 95%   BMI 36.92 kg/m²     Physical Exam   Constitutional: He is oriented to person, place, and time. He appears well-developed and well-nourished. HENT:   Head: Normocephalic and atraumatic. Eyes: Conjunctivae and EOM are normal. Right eye exhibits no discharge. Left eye exhibits no discharge. Neck: Normal range of motion. Cardiovascular: Regular rhythm. Pulmonary/Chest: Breath sounds normal. No respiratory distress. He has no wheezes. Abdominal: Soft. He exhibits no distension and no mass. There is tenderness. There is no rebound. Genitourinary: Rectal exam shows guaiac negative stool. Musculoskeletal: Normal range of motion. He exhibits no deformity. Neurological: He is alert and oriented to person, place, and time. He exhibits normal muscle tone. Coordination normal.   Skin: Skin is warm. He is not diaphoretic.        DIFFERENTIAL  DIAGNOSIS     PLAN (LABS / IMAGING / EKG):  Orders Placed This Encounter   Procedures    CT ABDOMEN PELVIS WO IV CONTRAST    CBC WITH AUTO DIFFERENTIAL    BASIC METABOLIC PANEL    HEPATIC FUNCTION PANEL    Lipase    Insert peripheral IV       MEDICATIONS ORDERED:  Orders Placed This Encounter   Medications    0.9 % sodium chloride bolus    ondansetron (ZOFRAN) injection 4 mg    morphine (PF) injection 4 mg    morphine 2 MG/ML injection     LUDIVINA CHOUDHURY: cabinet override    diphenhydrAMINE (BENADRYL) injection 25 mg    dicyclomine (BENTYL) 10 MG capsule     Sig: Take 1 capsule by mouth every 6 hours as needed (cramps)     Dispense:  20 capsule     Refill:  0         DIAGNOSTIC RESULTS / EMERGENCY DEPARTMENT COURSE / MDM     LABS:  Results for orders placed or performed during the hospital encounter of 10/11/17   CBC WITH AUTO DIFFERENTIAL   Result Value Ref Range    WBC 6.5 3.5 - 11.0 k/uL    RBC 4.31 (L) 4.5 - 5.9 m/uL    Hemoglobin 12.8 (L) 13.5 - 17.5 g/dL    Hematocrit 38.3 (L) 41 - 53 %    MCV 88.8 80 - 100 fL    MCH 29.7 26 - 34 pg    MCHC 33.5 31 - 37 g/dL    RDW 15.3 12.5 - 15.4 %    Platelets 396 070 - 267 k/uL    MPV 8.5 6.0 - 12.0 fL    Differential Type NOT REPORTED     WBC Morphology NOT REPORTED     RBC Morphology NOT REPORTED     Platelet Estimate NOT REPORTED     Seg Neutrophils 58 %    Lymphocytes 34 %    Monocytes 6 %    Eosinophils % 1 %    Basophils 1 %    Segs Absolute 3.80 1.8 - 7.7 k/uL    Absolute Lymph # 2.20 1.0 - 4.8 k/uL    Absolute Mono # 0.40 0.1 - 1.2 k/uL    Absolute Eos # 0.10 0.0 - 0.4 k/uL    Basophils # 0.00 0.0 - 0.2 k/uL   BASIC METABOLIC PANEL   Result Value Ref Range    Glucose 107 (H) 70 - 99 mg/dL    BUN 12 6 - 20 mg/dL    CREATININE 0.74 0.70 - 1.20 mg/dL    Bun/Cre Ratio NOT REPORTED 9 - 20    Calcium 8.7 8.6 - 10.4 mg/dL    Sodium 137 135 - 144 mmol/L    Potassium 3.8 3.7 - 5.3 mmol/L    Chloride 97 (L) 98 - 107 mmol/L    CO2 26 20 - 31 mmol/L    Anion Gap 14 9 - 17 mmol/L    GFR Non-African American >60 >60 mL/min    GFR African American >60 >60 mL/min    GFR Comment          GFR Staging NOT REPORTED    HEPATIC FUNCTION PANEL   Result Value Ref Range    Alb 4.5 3.5 - 5.2 g/dL    Alkaline Phosphatase 82 40 - 129 U/L     (H) 5 - 41 U/L     (H) <40 U/L    Total Bilirubin 0.20 (L) 0.3 - 1.2 mg/dL    Bilirubin, Direct <0.08 <0.31 mg/dL    Bilirubin, Indirect CANNOT BE CALCULATED 0.00 - 1.00 mg/dL    Total Protein 8.2 6.4 - 8.3 g/dL    Globulin NOT REPORTED 1.5 - 3.8 g/dL    Albumin/Globulin Ratio 1.2 1.0 - 2.5   Lipase   Result Value Ref Range    Lipase 41 13 - 60 U/L       IMPRESSION: 26yoM LLQ abd pain; awake/alert/appropriately oriented/appears uncomfortable. Abd: good bowel sounds all quadrants, mild guarding, nondistended. Mild/reg tachycardic. Plan for rehydration, basic labs + abd labs, anti-emetics/analgesics.   ?SBO given prior abd sx, diverticulitis/-osis given hx constipation though unlikely, huynh score 3-4: appy unlikely. Low threshold for CT abd/pelv if labs concerning    No leukocytosis, no L shift. Mild elevation LFT. Will add lipase, get CT abd/pelv - no contrast given allergy. CT abd/pelv as below - unremarkable. Pending lipase. RADIOLOGY:  Ct Abdomen Pelvis Wo Iv Contrast    Result Date: 10/11/2017  EXAMINATION: CT OF THE ABDOMEN AND PELVIS WITHOUT CONTRAST 10/11/2017 5:47 pm TECHNIQUE: CT of the abdomen and pelvis was performed without the administration of intravenous contrast. Multiplanar reformatted images are provided for review. Dose modulation, iterative reconstruction, and/or weight based adjustment of the mA/kV was utilized to reduce the radiation dose to as low as reasonably achievable. COMPARISON: None. HISTORY: ORDERING SYSTEM PROVIDED HISTORY: recurrent LLQ pain, mild transaminitis Chronic left lower quadrant pain FINDINGS: Lower Chest: No airspace disease. No pleural or pericardial effusion Organs: The liver is of low-attenuation throughout. No focal hepatic abnormality. Status postcholecystectomy. The kidneys, adrenal glands, spleen and pancreas appear normal. GI/Bowel: The bowel loops are not dilated. No focal bowel wall thickening. The appendix appears normal. Pelvis: No mass lesions. No abnormal fluid collections. No bladder or ureteral stones. Peritoneum/Retroperitoneum: No adenopathy. No extraluminal gas or abnormal fluid collections. Bones/Soft Tissues: No soft tissue hernia. No fractures or focal bony lesions. 1. No acute abnormality is seen in the abdomen or pelvis 2. Fatty infiltration of the liver 3. Status postcholecystectomy 4. Normal appearing appendix 5. No urinary tract stones           PROCEDURES:  None    CONSULTS:  None    CRITICAL CARE:  None    FINAL IMPRESSION      1.  Left lower quadrant pain          DISPOSITION / PLAN     DISPOSITION   Discharged      PATIENT REFERRED TO:  Radha Cotto MD  [facility]

## 2017-10-11 NOTE — ED NOTES
RN at bedside, pt medicated  Resident at bedside for examination       Praneeth Shepard RN  10/11/17 1940

## 2017-10-12 ENCOUNTER — TELEPHONE (OUTPATIENT)
Dept: FAMILY MEDICINE CLINIC | Age: 27
End: 2017-10-12

## 2017-10-12 NOTE — TELEPHONE ENCOUNTER
Called patient left message on patient answer machine to call the office back to make himself appointment with you for his medication refill.  Thank you

## 2017-10-14 ENCOUNTER — APPOINTMENT (OUTPATIENT)
Dept: CT IMAGING | Age: 27
End: 2017-10-14
Payer: COMMERCIAL

## 2017-10-14 ENCOUNTER — HOSPITAL ENCOUNTER (EMERGENCY)
Age: 27
Discharge: HOME | End: 2017-10-14
Attending: EMERGENCY MEDICINE
Payer: COMMERCIAL

## 2017-10-14 ENCOUNTER — APPOINTMENT (OUTPATIENT)
Dept: GENERAL RADIOLOGY | Age: 27
End: 2017-10-14
Payer: COMMERCIAL

## 2017-10-14 VITALS
HEART RATE: 91 BPM | HEIGHT: 67 IN | OXYGEN SATURATION: 100 % | TEMPERATURE: 96.6 F | RESPIRATION RATE: 16 BRPM | SYSTOLIC BLOOD PRESSURE: 138 MMHG | WEIGHT: 250 LBS | DIASTOLIC BLOOD PRESSURE: 72 MMHG | BODY MASS INDEX: 39.24 KG/M2

## 2017-10-14 DIAGNOSIS — M25.562 ACUTE PAIN OF LEFT KNEE: Primary | ICD-10-CM

## 2017-10-14 DIAGNOSIS — R10.9 ABDOMINAL PAIN, UNSPECIFIED ABDOMINAL LOCATION: ICD-10-CM

## 2017-10-14 LAB
-: NORMAL
ABSOLUTE EOS #: 0.1 K/UL (ref 0–0.4)
ABSOLUTE LYMPH #: 1.9 K/UL (ref 1–4.8)
ABSOLUTE MONO #: 0.5 K/UL (ref 0.1–1.2)
AMORPHOUS: NORMAL
AMPHETAMINE SCREEN URINE: NEGATIVE
ANION GAP SERPL CALCULATED.3IONS-SCNC: 13 MMOL/L (ref 9–17)
BACTERIA: NORMAL
BARBITURATE SCREEN URINE: NEGATIVE
BASOPHILS # BLD: 0 %
BASOPHILS ABSOLUTE: 0 K/UL (ref 0–0.2)
BENZODIAZEPINE SCREEN, URINE: POSITIVE
BILIRUBIN URINE: NEGATIVE
BUN BLDV-MCNC: 9 MG/DL (ref 6–20)
BUN/CREAT BLD: ABNORMAL (ref 9–20)
BUPRENORPHINE URINE: ABNORMAL
CALCIUM SERPL-MCNC: 9 MG/DL (ref 8.6–10.4)
CANNABINOID SCREEN URINE: NEGATIVE
CASTS UA: NORMAL /LPF (ref 0–8)
CHLORIDE BLD-SCNC: 98 MMOL/L (ref 98–107)
CO2: 27 MMOL/L (ref 20–31)
COCAINE METABOLITE, URINE: NEGATIVE
COLOR: ABNORMAL
COMMENT UA: ABNORMAL
CREAT SERPL-MCNC: 0.68 MG/DL (ref 0.7–1.2)
CRYSTALS, UA: NORMAL /HPF
DIFFERENTIAL TYPE: ABNORMAL
EOSINOPHILS RELATIVE PERCENT: 2 %
EPITHELIAL CELLS UA: NORMAL /HPF (ref 0–5)
GFR AFRICAN AMERICAN: >60 ML/MIN
GFR NON-AFRICAN AMERICAN: >60 ML/MIN
GFR SERPL CREATININE-BSD FRML MDRD: ABNORMAL ML/MIN/{1.73_M2}
GFR SERPL CREATININE-BSD FRML MDRD: ABNORMAL ML/MIN/{1.73_M2}
GLUCOSE BLD-MCNC: 105 MG/DL (ref 70–99)
GLUCOSE URINE: NEGATIVE
HCT VFR BLD CALC: 38.6 % (ref 41–53)
HEMOGLOBIN: 12.9 G/DL (ref 13.5–17.5)
KETONES, URINE: ABNORMAL
LEUKOCYTE ESTERASE, URINE: NEGATIVE
LYMPHOCYTES # BLD: 35 %
MCH RBC QN AUTO: 29.7 PG (ref 26–34)
MCHC RBC AUTO-ENTMCNC: 33.6 G/DL (ref 31–37)
MCV RBC AUTO: 88.4 FL (ref 80–100)
MDMA URINE: ABNORMAL
METHADONE SCREEN, URINE: POSITIVE
METHAMPHETAMINE, URINE: ABNORMAL
MONOCYTES # BLD: 10 %
MUCUS: NORMAL
NITRITE, URINE: NEGATIVE
OPIATES, URINE: NEGATIVE
OTHER OBSERVATIONS UA: NORMAL
OXYCODONE SCREEN URINE: NEGATIVE
PDW BLD-RTO: 15 % (ref 12.5–15.4)
PH UA: 7 (ref 5–8)
PHENCYCLIDINE, URINE: POSITIVE
PLATELET # BLD: 237 K/UL (ref 140–450)
PLATELET ESTIMATE: ABNORMAL
PMV BLD AUTO: 8 FL (ref 6–12)
POTASSIUM SERPL-SCNC: 4.3 MMOL/L (ref 3.7–5.3)
PROPOXYPHENE, URINE: ABNORMAL
PROTEIN UA: ABNORMAL
RBC # BLD: 4.36 M/UL (ref 4.5–5.9)
RBC # BLD: ABNORMAL 10*6/UL
RBC UA: NORMAL /HPF (ref 0–4)
RENAL EPITHELIAL, UA: NORMAL /HPF
SEG NEUTROPHILS: 53 %
SEGMENTED NEUTROPHILS ABSOLUTE COUNT: 2.9 K/UL (ref 1.8–7.7)
SODIUM BLD-SCNC: 138 MMOL/L (ref 135–144)
SPECIFIC GRAVITY UA: 1.03 (ref 1–1.03)
TEST INFORMATION: ABNORMAL
TRICHOMONAS: NORMAL
TRICYCLIC ANTIDEPRESSANTS, UR: ABNORMAL
TURBIDITY: CLEAR
URINE HGB: NEGATIVE
UROBILINOGEN, URINE: NORMAL
WBC # BLD: 5.5 K/UL (ref 3.5–11)
WBC # BLD: ABNORMAL 10*3/UL
WBC UA: NORMAL /HPF (ref 0–5)
YEAST: NORMAL

## 2017-10-14 PROCEDURE — 96375 TX/PRO/DX INJ NEW DRUG ADDON: CPT

## 2017-10-14 PROCEDURE — 6360000002 HC RX W HCPCS: Performed by: EMERGENCY MEDICINE

## 2017-10-14 PROCEDURE — 85025 COMPLETE CBC W/AUTO DIFF WBC: CPT

## 2017-10-14 PROCEDURE — 99284 EMERGENCY DEPT VISIT MOD MDM: CPT

## 2017-10-14 PROCEDURE — 96376 TX/PRO/DX INJ SAME DRUG ADON: CPT

## 2017-10-14 PROCEDURE — 96374 THER/PROPH/DIAG INJ IV PUSH: CPT

## 2017-10-14 PROCEDURE — 73562 X-RAY EXAM OF KNEE 3: CPT

## 2017-10-14 PROCEDURE — 80307 DRUG TEST PRSMV CHEM ANLYZR: CPT

## 2017-10-14 PROCEDURE — 80048 BASIC METABOLIC PNL TOTAL CA: CPT

## 2017-10-14 PROCEDURE — 6370000000 HC RX 637 (ALT 250 FOR IP): Performed by: EMERGENCY MEDICINE

## 2017-10-14 PROCEDURE — 6360000004 HC RX CONTRAST MEDICATION: Performed by: EMERGENCY MEDICINE

## 2017-10-14 PROCEDURE — 2580000003 HC RX 258: Performed by: EMERGENCY MEDICINE

## 2017-10-14 PROCEDURE — 73110 X-RAY EXAM OF WRIST: CPT

## 2017-10-14 PROCEDURE — 74177 CT ABD & PELVIS W/CONTRAST: CPT

## 2017-10-14 PROCEDURE — 81001 URINALYSIS AUTO W/SCOPE: CPT

## 2017-10-14 RX ORDER — 0.9 % SODIUM CHLORIDE 0.9 %
1000 INTRAVENOUS SOLUTION INTRAVENOUS ONCE
Status: COMPLETED | OUTPATIENT
Start: 2017-10-14 | End: 2017-10-14

## 2017-10-14 RX ORDER — LORAZEPAM 2 MG/ML
0.5 INJECTION INTRAMUSCULAR ONCE
Status: DISCONTINUED | OUTPATIENT
Start: 2017-10-14 | End: 2017-10-14

## 2017-10-14 RX ORDER — KETOROLAC TROMETHAMINE 30 MG/ML
15 INJECTION, SOLUTION INTRAMUSCULAR; INTRAVENOUS ONCE
Status: COMPLETED | OUTPATIENT
Start: 2017-10-14 | End: 2017-10-14

## 2017-10-14 RX ORDER — PROMETHAZINE HYDROCHLORIDE 25 MG/1
25 TABLET ORAL ONCE
Status: COMPLETED | OUTPATIENT
Start: 2017-10-14 | End: 2017-10-14

## 2017-10-14 RX ORDER — DIPHENHYDRAMINE HYDROCHLORIDE 50 MG/ML
25 INJECTION INTRAMUSCULAR; INTRAVENOUS ONCE
Status: COMPLETED | OUTPATIENT
Start: 2017-10-14 | End: 2017-10-14

## 2017-10-14 RX ORDER — METHYLPREDNISOLONE SODIUM SUCCINATE 125 MG/2ML
40 INJECTION, POWDER, LYOPHILIZED, FOR SOLUTION INTRAMUSCULAR; INTRAVENOUS ONCE
Status: COMPLETED | OUTPATIENT
Start: 2017-10-14 | End: 2017-10-14

## 2017-10-14 RX ORDER — ACETAMINOPHEN 500 MG
1000 TABLET ORAL ONCE
Status: COMPLETED | OUTPATIENT
Start: 2017-10-14 | End: 2017-10-14

## 2017-10-14 RX ORDER — DIPHENHYDRAMINE HYDROCHLORIDE 50 MG/ML
50 INJECTION INTRAMUSCULAR; INTRAVENOUS ONCE
Status: COMPLETED | OUTPATIENT
Start: 2017-10-14 | End: 2017-10-14

## 2017-10-14 RX ORDER — IBUPROFEN 800 MG/1
800 TABLET ORAL EVERY 8 HOURS PRN
Qty: 30 TABLET | Refills: 0 | Status: SHIPPED | OUTPATIENT
Start: 2017-10-14 | End: 2017-11-09

## 2017-10-14 RX ADMIN — PROMETHAZINE HYDROCHLORIDE 25 MG: 25 TABLET ORAL at 13:53

## 2017-10-14 RX ADMIN — IOPAMIDOL 130 ML: 755 INJECTION, SOLUTION INTRAVENOUS at 17:45

## 2017-10-14 RX ADMIN — ACETAMINOPHEN 1000 MG: 500 TABLET ORAL at 13:53

## 2017-10-14 RX ADMIN — DIPHENHYDRAMINE HYDROCHLORIDE 25 MG: 50 INJECTION, SOLUTION INTRAMUSCULAR; INTRAVENOUS at 12:52

## 2017-10-14 RX ADMIN — SODIUM CHLORIDE 1000 ML: 9 INJECTION, SOLUTION INTRAVENOUS at 11:56

## 2017-10-14 RX ADMIN — KETOROLAC TROMETHAMINE 15 MG: 30 INJECTION, SOLUTION INTRAMUSCULAR at 16:36

## 2017-10-14 RX ADMIN — METHYLPREDNISOLONE SODIUM SUCCINATE 40 MG: 125 INJECTION, POWDER, FOR SOLUTION INTRAMUSCULAR; INTRAVENOUS at 13:27

## 2017-10-14 RX ADMIN — DIPHENHYDRAMINE HYDROCHLORIDE 50 MG: 50 INJECTION, SOLUTION INTRAMUSCULAR; INTRAVENOUS at 16:35

## 2017-10-14 ASSESSMENT — PAIN SCALES - WONG BAKER: WONGBAKER_NUMERICALRESPONSE: 8

## 2017-10-14 ASSESSMENT — PAIN DESCRIPTION - DESCRIPTORS: DESCRIPTORS: CONSTANT

## 2017-10-14 ASSESSMENT — PAIN DESCRIPTION - PAIN TYPE: TYPE: ACUTE PAIN

## 2017-10-14 ASSESSMENT — PAIN SCALES - GENERAL: PAINLEVEL_OUTOF10: 8

## 2017-10-14 ASSESSMENT — PAIN DESCRIPTION - FREQUENCY: FREQUENCY: CONTINUOUS

## 2017-10-14 ASSESSMENT — PAIN DESCRIPTION - ORIENTATION: ORIENTATION: LEFT

## 2017-10-14 ASSESSMENT — PAIN DESCRIPTION - LOCATION: LOCATION: KNEE;WRIST

## 2017-10-14 NOTE — ED NOTES
Pt returned from xray and called out requesting pain medications, CT to complete soon per tech. Dr Alfredo Gee updated.      Osmar Florentino RN  10/14/17 4394
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
## 2017-10-14 NOTE — ED NOTES
Pt once again requesting pain medications, pt updated that the resident was informed of his requests prior and no orders where receivded. Pt sts \"o well can I have something for nausea\", offered zofran pt sts \"Ok but I prefer Phenergan\". Dr Jazmyn Vieira updated.      Lenora Garcias, RN  10/14/17 3561

## 2017-10-14 NOTE — ED NOTES
Pt reported \"I feel sleepy and like I am not breathing\". Pt talking in complete sentences without difficulty, handling secretions and no respiratory distress noted. Pulse ox placed 97-99% RA. Pt heard talking to friend at bedside as writer left the room. Dr Ruma Garcia updated.       Cecile Islas, FLORY  10/14/17 7421

## 2017-10-14 NOTE — ED NOTES
Attempted to get pt OOB and ambulate, pt sts \"I have to put my pants on first\".        Suzy Rodriguez RN  10/14/17 6122

## 2017-10-14 NOTE — ED NOTES
Attempted to ambulate pt and pt STS \"I cant right now I need a moment I found out I have stuff in my urine\".      Osmar Florentino RN  10/14/17 9188

## 2017-10-14 NOTE — ED NOTES
Pt in wheelchair when [facility]. Pt refused to ambulate for writer, took own IV out and sts \"my friend got me the wheelchair\". Pt unwilling to sts if he can walk or not, when asked he just looks at Brenna Guardian, when re-questioned pt sts \" it hurt\". Dr Camila Borwn updated.        Mariel Berrios, FLORY  10/14/17 3959

## 2017-10-14 NOTE — ED NOTES
Ct notified that pt must be pre-medicated with Benadryl prior to CT.        Jacobo Serrato RN  10/14/17 8114

## 2017-10-15 ASSESSMENT — ENCOUNTER SYMPTOMS
RHINORRHEA: 0
COUGH: 0
SHORTNESS OF BREATH: 0
NAUSEA: 0
ABDOMINAL PAIN: 1
VOMITING: 0
EYE PAIN: 0
SORE THROAT: 0
PHOTOPHOBIA: 0
DIARRHEA: 0
SINUS PRESSURE: 0
BLOOD IN STOOL: 0

## 2017-11-09 ENCOUNTER — APPOINTMENT (OUTPATIENT)
Dept: GENERAL RADIOLOGY | Age: 27
End: 2017-11-09
Payer: COMMERCIAL

## 2017-11-09 ENCOUNTER — HOSPITAL ENCOUNTER (EMERGENCY)
Age: 27
Discharge: HOME | End: 2017-11-09
Attending: EMERGENCY MEDICINE
Payer: COMMERCIAL

## 2017-11-09 VITALS
OXYGEN SATURATION: 96 % | RESPIRATION RATE: 13 BRPM | WEIGHT: 255 LBS | HEIGHT: 69 IN | HEART RATE: 78 BPM | BODY MASS INDEX: 37.77 KG/M2 | SYSTOLIC BLOOD PRESSURE: 147 MMHG | DIASTOLIC BLOOD PRESSURE: 92 MMHG | TEMPERATURE: 97.9 F

## 2017-11-09 DIAGNOSIS — R07.81 RIB PAIN ON RIGHT SIDE: Primary | ICD-10-CM

## 2017-11-09 DIAGNOSIS — S81.802A WOUND OF LEFT LEG, INITIAL ENCOUNTER: ICD-10-CM

## 2017-11-09 LAB
ABSOLUTE EOS #: 0.13 K/UL (ref 0–0.44)
ABSOLUTE IMMATURE GRANULOCYTE: <0.03 K/UL (ref 0–0.3)
ABSOLUTE LYMPH #: 1.82 K/UL (ref 1.1–3.7)
ABSOLUTE MONO #: 0.54 K/UL (ref 0.1–1.2)
ALBUMIN SERPL-MCNC: 4.1 G/DL (ref 3.5–5.2)
ALBUMIN/GLOBULIN RATIO: 1.2 (ref 1–2.5)
ALP BLD-CCNC: 76 U/L (ref 40–129)
ALT SERPL-CCNC: 106 U/L (ref 5–41)
ANION GAP SERPL CALCULATED.3IONS-SCNC: 11 MMOL/L (ref 9–17)
AST SERPL-CCNC: 77 U/L
BASOPHILS # BLD: 0 % (ref 0–2)
BASOPHILS ABSOLUTE: <0.03 K/UL (ref 0–0.2)
BILIRUB SERPL-MCNC: 0.17 MG/DL (ref 0.3–1.2)
BUN BLDV-MCNC: 9 MG/DL (ref 6–20)
BUN/CREAT BLD: ABNORMAL (ref 9–20)
CALCIUM SERPL-MCNC: 8.7 MG/DL (ref 8.6–10.4)
CHLORIDE BLD-SCNC: 100 MMOL/L (ref 98–107)
CO2: 28 MMOL/L (ref 20–31)
CREAT SERPL-MCNC: 0.69 MG/DL (ref 0.7–1.2)
D-DIMER QUANTITATIVE: 0.25 MG/L FEU
DIFFERENTIAL TYPE: ABNORMAL
EKG ATRIAL RATE: 78 BPM
EKG P AXIS: 52 DEGREES
EKG P-R INTERVAL: 138 MS
EKG Q-T INTERVAL: 384 MS
EKG QRS DURATION: 72 MS
EKG QTC CALCULATION (BAZETT): 437 MS
EKG R AXIS: 50 DEGREES
EKG T AXIS: 30 DEGREES
EKG VENTRICULAR RATE: 78 BPM
EOSINOPHILS RELATIVE PERCENT: 2 % (ref 1–4)
GFR AFRICAN AMERICAN: >60 ML/MIN
GFR NON-AFRICAN AMERICAN: >60 ML/MIN
GFR SERPL CREATININE-BSD FRML MDRD: ABNORMAL ML/MIN/{1.73_M2}
GFR SERPL CREATININE-BSD FRML MDRD: ABNORMAL ML/MIN/{1.73_M2}
GLUCOSE BLD-MCNC: 93 MG/DL (ref 70–99)
HCT VFR BLD CALC: 35.4 % (ref 40.7–50.3)
HEMOGLOBIN: 11.4 G/DL (ref 13–17)
IMMATURE GRANULOCYTES: 0 %
LIPASE: 42 U/L (ref 13–60)
LYMPHOCYTES # BLD: 34 % (ref 24–43)
MCH RBC QN AUTO: 29.4 PG (ref 25.2–33.5)
MCHC RBC AUTO-ENTMCNC: 32.2 G/DL (ref 28.4–34.8)
MCV RBC AUTO: 91.2 FL (ref 82.6–102.9)
MONOCYTES # BLD: 10 % (ref 3–12)
PDW BLD-RTO: 13.2 % (ref 11.8–14.4)
PLATELET # BLD: 236 K/UL (ref 138–453)
PLATELET ESTIMATE: ABNORMAL
PMV BLD AUTO: 10 FL (ref 8.1–13.5)
POTASSIUM SERPL-SCNC: 4 MMOL/L (ref 3.7–5.3)
RBC # BLD: 3.88 M/UL (ref 4.21–5.77)
RBC # BLD: ABNORMAL 10*6/UL
SEG NEUTROPHILS: 53 % (ref 36–65)
SEGMENTED NEUTROPHILS ABSOLUTE COUNT: 2.8 K/UL (ref 1.5–8.1)
SODIUM BLD-SCNC: 139 MMOL/L (ref 135–144)
TOTAL PROTEIN: 7.4 G/DL (ref 6.4–8.3)
WBC # BLD: 5.3 K/UL (ref 3.5–11.3)
WBC # BLD: ABNORMAL 10*3/UL

## 2017-11-09 PROCEDURE — 85379 FIBRIN DEGRADATION QUANT: CPT

## 2017-11-09 PROCEDURE — 83690 ASSAY OF LIPASE: CPT

## 2017-11-09 PROCEDURE — 96375 TX/PRO/DX INJ NEW DRUG ADDON: CPT

## 2017-11-09 PROCEDURE — 80053 COMPREHEN METABOLIC PANEL: CPT

## 2017-11-09 PROCEDURE — 71020 XR CHEST STANDARD TWO VW: CPT

## 2017-11-09 PROCEDURE — 99283 EMERGENCY DEPT VISIT LOW MDM: CPT

## 2017-11-09 PROCEDURE — 93005 ELECTROCARDIOGRAM TRACING: CPT

## 2017-11-09 PROCEDURE — 85025 COMPLETE CBC W/AUTO DIFF WBC: CPT

## 2017-11-09 PROCEDURE — 6360000002 HC RX W HCPCS: Performed by: EMERGENCY MEDICINE

## 2017-11-09 PROCEDURE — 6370000000 HC RX 637 (ALT 250 FOR IP): Performed by: EMERGENCY MEDICINE

## 2017-11-09 PROCEDURE — 96374 THER/PROPH/DIAG INJ IV PUSH: CPT

## 2017-11-09 RX ORDER — ONDANSETRON 4 MG/1
4 TABLET, ORALLY DISINTEGRATING ORAL EVERY 8 HOURS PRN
Qty: 4 TABLET | Refills: 0 | Status: SHIPPED | OUTPATIENT
Start: 2017-11-09 | End: 2017-11-09

## 2017-11-09 RX ORDER — FENTANYL CITRATE 50 UG/ML
50 INJECTION, SOLUTION INTRAMUSCULAR; INTRAVENOUS ONCE
Status: COMPLETED | OUTPATIENT
Start: 2017-11-09 | End: 2017-11-09

## 2017-11-09 RX ORDER — PROMETHAZINE HYDROCHLORIDE 25 MG/1
25 TABLET ORAL EVERY 8 HOURS PRN
Qty: 4 TABLET | Refills: 0 | Status: SHIPPED | OUTPATIENT
Start: 2017-11-09 | End: 2017-11-16

## 2017-11-09 RX ORDER — FAMOTIDINE 20 MG/1
20 TABLET, FILM COATED ORAL 2 TIMES DAILY
Qty: 60 TABLET | Refills: 0 | Status: ON HOLD | OUTPATIENT
Start: 2017-11-09 | End: 2017-12-14 | Stop reason: ALTCHOICE

## 2017-11-09 RX ORDER — MUPIROCIN CALCIUM 20 MG/G
CREAM TOPICAL 3 TIMES DAILY
Qty: 15 G | Refills: 0 | Status: SHIPPED | OUTPATIENT
Start: 2017-11-09 | End: 2018-03-21 | Stop reason: ALTCHOICE

## 2017-11-09 RX ORDER — DIPHENHYDRAMINE HYDROCHLORIDE 50 MG/ML
25 INJECTION INTRAMUSCULAR; INTRAVENOUS ONCE
Status: COMPLETED | OUTPATIENT
Start: 2017-11-09 | End: 2017-11-09

## 2017-11-09 RX ORDER — DIPHENHYDRAMINE HCL 25 MG
25 CAPSULE ORAL EVERY 6 HOURS PRN
Qty: 20 CAPSULE | Refills: 0 | Status: SHIPPED | OUTPATIENT
Start: 2017-11-09 | End: 2017-11-19

## 2017-11-09 RX ORDER — DIPHENHYDRAMINE HCL 25 MG
25 TABLET ORAL ONCE
Status: DISCONTINUED | OUTPATIENT
Start: 2017-11-09 | End: 2017-11-09 | Stop reason: HOSPADM

## 2017-11-09 RX ORDER — IBUPROFEN 800 MG/1
800 TABLET ORAL EVERY 8 HOURS PRN
Qty: 30 TABLET | Refills: 0 | Status: SHIPPED | OUTPATIENT
Start: 2017-11-09 | End: 2018-02-25 | Stop reason: ALTCHOICE

## 2017-11-09 RX ORDER — IBUPROFEN 800 MG/1
800 TABLET ORAL ONCE
Status: COMPLETED | OUTPATIENT
Start: 2017-11-09 | End: 2017-11-09

## 2017-11-09 RX ADMIN — DIPHENHYDRAMINE HYDROCHLORIDE 25 MG: 50 INJECTION, SOLUTION INTRAMUSCULAR; INTRAVENOUS at 13:58

## 2017-11-09 RX ADMIN — DIPHENHYDRAMINE HYDROCHLORIDE 25 MG: 50 INJECTION, SOLUTION INTRAMUSCULAR; INTRAVENOUS at 12:24

## 2017-11-09 RX ADMIN — IBUPROFEN 800 MG: 800 TABLET, FILM COATED ORAL at 14:06

## 2017-11-09 RX ADMIN — FENTANYL CITRATE 50 MCG: 50 INJECTION INTRAMUSCULAR; INTRAVENOUS at 12:25

## 2017-11-09 ASSESSMENT — PAIN SCALES - GENERAL
PAINLEVEL_OUTOF10: 8
PAINLEVEL_OUTOF10: 7

## 2017-11-09 ASSESSMENT — PAIN DESCRIPTION - PAIN TYPE
TYPE: ACUTE PAIN
TYPE: ACUTE PAIN

## 2017-11-09 ASSESSMENT — PAIN DESCRIPTION - PROGRESSION
CLINICAL_PROGRESSION: GRADUALLY IMPROVING
CLINICAL_PROGRESSION: NOT CHANGED

## 2017-11-09 ASSESSMENT — ENCOUNTER SYMPTOMS
NAUSEA: 0
VOMITING: 0
BACK PAIN: 0
ABDOMINAL PAIN: 0
COUGH: 0
SORE THROAT: 0

## 2017-11-09 ASSESSMENT — PAIN DESCRIPTION - FREQUENCY: FREQUENCY: CONTINUOUS

## 2017-11-09 ASSESSMENT — PAIN DESCRIPTION - LOCATION
LOCATION: RIB CAGE
LOCATION: RIB CAGE

## 2017-11-09 ASSESSMENT — PAIN DESCRIPTION - ORIENTATION
ORIENTATION: RIGHT
ORIENTATION: RIGHT

## 2017-11-09 ASSESSMENT — PAIN DESCRIPTION - DESCRIPTORS: DESCRIPTORS: CONSTANT

## 2017-11-09 NOTE — ED NOTES
Report received from [facility]. Writer to bedside. Pt not on cardiac monitor. Pt reports \"Some jelly came in here because it was going off so he just took me off\". Dr. Snyder Ip notified. Will continue to monitor.       Farzana Guillory RN  11/09/17 3586

## 2017-11-09 NOTE — ED PROVIDER NOTES
and his pulse is 78. His respiration is 13 and oxygen saturation is 96%.    32 y.o. male appears uncomfortable but no acute distress, cardiac exam regular rate and rhythm no murmurs or scalp scalp on a clear to auscultation bilaterally abdomen soft, there is mild tenderness in the right upper quadrant along the inferior rib edge, no rebound no guarding, liver is palpable 1 cm below the rib space. Skin exam shows maculopapular rash on right shoulder with some excoriations, left lower extremity shows a linear scabbing with excoriation noted    Impression: Abdominal pain    Plan: Abdominal labs EKG and troponin, analgesia, PPI and antiemetic. Pre-hypertension/Hypertension: The patient has been informed that they may have pre-hypertension or Hypertension based on a blood pressure reading in the emergency department. I recommend that the patient call the primary care provider listed on their discharge instructions or a physician of their choice this week to arrange follow up for further evaluation of possible pre-hypertension or Hypertension.       EKG Interpretation    Interpreted by me    Rhythm: normal sinus   Rate: normal  Axis: normal  Ectopy: none  Conduction: normal  ST Segments: no acute change  T Waves: no acute change  Q Waves: none    Clinical Impression: no acute changes and normal EKG      Leonard Kidd MD  Attending Emergency Physician        Maryjane Pride MD  11/09/17 3029

## 2017-11-09 NOTE — CARE COORDINATION
Met with pt concerning new PCP. Pt is a current patient at the [place] [address] clinic, however pt is unhappy with the physician he is seeing there. Pt has been dismissed from the [facility]. Since pt is a current pt at the [facility] explained to pt that he will need to call to request new physician. Clinic list also provided to pt.
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
## 2017-12-08 RX ORDER — PROMETHAZINE HYDROCHLORIDE 25 MG/1
TABLET ORAL
Qty: 40 TABLET | Refills: 0 | OUTPATIENT
Start: 2017-12-08

## 2017-12-14 ENCOUNTER — HOSPITAL ENCOUNTER (OUTPATIENT)
Age: 27
Setting detail: OBSERVATION
Discharge: HOME | End: 2017-12-15
Attending: EMERGENCY MEDICINE | Admitting: EMERGENCY MEDICINE
Payer: COMMERCIAL

## 2017-12-14 ENCOUNTER — APPOINTMENT (OUTPATIENT)
Dept: GENERAL RADIOLOGY | Age: 27
End: 2017-12-14
Payer: COMMERCIAL

## 2017-12-14 DIAGNOSIS — R11.2 INTRACTABLE VOMITING WITH NAUSEA, UNSPECIFIED VOMITING TYPE: Primary | ICD-10-CM

## 2017-12-14 LAB
ABSOLUTE EOS #: <0.03 K/UL (ref 0–0.44)
ABSOLUTE IMMATURE GRANULOCYTE: <0.03 K/UL (ref 0–0.3)
ABSOLUTE LYMPH #: 2.69 K/UL (ref 1.1–3.7)
ABSOLUTE MONO #: 0.49 K/UL (ref 0.1–1.2)
ALBUMIN SERPL-MCNC: 4.5 G/DL (ref 3.5–5.2)
ALBUMIN/GLOBULIN RATIO: 1.1 (ref 1–2.5)
ALP BLD-CCNC: 79 U/L (ref 40–129)
ALT SERPL-CCNC: 73 U/L (ref 5–41)
ANION GAP SERPL CALCULATED.3IONS-SCNC: 15 MMOL/L (ref 9–17)
AST SERPL-CCNC: 69 U/L
BASOPHILS # BLD: 0 % (ref 0–2)
BASOPHILS ABSOLUTE: <0.03 K/UL (ref 0–0.2)
BILIRUB SERPL-MCNC: 0.37 MG/DL (ref 0.3–1.2)
BUN BLDV-MCNC: 12 MG/DL (ref 6–20)
BUN/CREAT BLD: ABNORMAL (ref 9–20)
CALCIUM SERPL-MCNC: 8.9 MG/DL (ref 8.6–10.4)
CHLORIDE BLD-SCNC: 99 MMOL/L (ref 98–107)
CO2: 26 MMOL/L (ref 20–31)
CREAT SERPL-MCNC: 0.78 MG/DL (ref 0.7–1.2)
DIFFERENTIAL TYPE: ABNORMAL
EOSINOPHILS RELATIVE PERCENT: 0 % (ref 1–4)
GFR AFRICAN AMERICAN: >60 ML/MIN
GFR NON-AFRICAN AMERICAN: >60 ML/MIN
GFR SERPL CREATININE-BSD FRML MDRD: ABNORMAL ML/MIN/{1.73_M2}
GFR SERPL CREATININE-BSD FRML MDRD: ABNORMAL ML/MIN/{1.73_M2}
GLUCOSE BLD-MCNC: 87 MG/DL (ref 70–99)
HCT VFR BLD CALC: 40.3 % (ref 40.7–50.3)
HEMOGLOBIN: 13.8 G/DL (ref 13–17)
IMMATURE GRANULOCYTES: 0 %
LACTIC ACID, WHOLE BLOOD: 1.2 MMOL/L (ref 0.7–2.1)
LIPASE: 38 U/L (ref 13–60)
LYMPHOCYTES # BLD: 30 % (ref 24–43)
MCH RBC QN AUTO: 30.2 PG (ref 25.2–33.5)
MCHC RBC AUTO-ENTMCNC: 34.2 G/DL (ref 28.4–34.8)
MCV RBC AUTO: 88.2 FL (ref 82.6–102.9)
MONOCYTES # BLD: 6 % (ref 3–12)
PDW BLD-RTO: 12.9 % (ref 11.8–14.4)
PLATELET # BLD: 277 K/UL (ref 138–453)
PLATELET ESTIMATE: ABNORMAL
PMV BLD AUTO: 10.6 FL (ref 8.1–13.5)
POTASSIUM SERPL-SCNC: 4.8 MMOL/L (ref 3.7–5.3)
RBC # BLD: 4.57 M/UL (ref 4.21–5.77)
RBC # BLD: ABNORMAL 10*6/UL
SEG NEUTROPHILS: 64 % (ref 36–65)
SEGMENTED NEUTROPHILS ABSOLUTE COUNT: 5.69 K/UL (ref 1.5–8.1)
SODIUM BLD-SCNC: 140 MMOL/L (ref 135–144)
TOTAL PROTEIN: 8.7 G/DL (ref 6.4–8.3)
WBC # BLD: 8.9 K/UL (ref 3.5–11.3)
WBC # BLD: ABNORMAL 10*3/UL

## 2017-12-14 PROCEDURE — G0378 HOSPITAL OBSERVATION PER HR: HCPCS

## 2017-12-14 PROCEDURE — 96376 TX/PRO/DX INJ SAME DRUG ADON: CPT

## 2017-12-14 PROCEDURE — 96372 THER/PROPH/DIAG INJ SC/IM: CPT

## 2017-12-14 PROCEDURE — 80053 COMPREHEN METABOLIC PANEL: CPT

## 2017-12-14 PROCEDURE — 99285 EMERGENCY DEPT VISIT HI MDM: CPT

## 2017-12-14 PROCEDURE — 83605 ASSAY OF LACTIC ACID: CPT

## 2017-12-14 PROCEDURE — 96374 THER/PROPH/DIAG INJ IV PUSH: CPT

## 2017-12-14 PROCEDURE — 83690 ASSAY OF LIPASE: CPT

## 2017-12-14 PROCEDURE — 85025 COMPLETE CBC W/AUTO DIFF WBC: CPT

## 2017-12-14 PROCEDURE — 6360000002 HC RX W HCPCS: Performed by: EMERGENCY MEDICINE

## 2017-12-14 PROCEDURE — 96375 TX/PRO/DX INJ NEW DRUG ADDON: CPT

## 2017-12-14 RX ORDER — KETOROLAC TROMETHAMINE 30 MG/ML
30 INJECTION, SOLUTION INTRAMUSCULAR; INTRAVENOUS ONCE
Status: COMPLETED | OUTPATIENT
Start: 2017-12-14 | End: 2017-12-14

## 2017-12-14 RX ORDER — SODIUM CHLORIDE 0.9 % (FLUSH) 0.9 %
10 SYRINGE (ML) INJECTION PRN
Status: DISCONTINUED | OUTPATIENT
Start: 2017-12-14 | End: 2017-12-15 | Stop reason: HOSPADM

## 2017-12-14 RX ORDER — METHADONE HYDROCHLORIDE 10 MG/1
170 TABLET ORAL DAILY
Status: DISCONTINUED | OUTPATIENT
Start: 2017-12-15 | End: 2017-12-15 | Stop reason: HOSPADM

## 2017-12-14 RX ORDER — SODIUM CHLORIDE 0.9 % (FLUSH) 0.9 %
10 SYRINGE (ML) INJECTION EVERY 12 HOURS SCHEDULED
Status: DISCONTINUED | OUTPATIENT
Start: 2017-12-14 | End: 2017-12-15 | Stop reason: HOSPADM

## 2017-12-14 RX ORDER — GABAPENTIN 400 MG/1
400 CAPSULE ORAL 2 TIMES DAILY
Status: DISCONTINUED | OUTPATIENT
Start: 2017-12-14 | End: 2017-12-15 | Stop reason: HOSPADM

## 2017-12-14 RX ORDER — ONDANSETRON 2 MG/ML
4 INJECTION INTRAMUSCULAR; INTRAVENOUS ONCE
Status: DISCONTINUED | OUTPATIENT
Start: 2017-12-14 | End: 2017-12-14

## 2017-12-14 RX ORDER — ERGOCALCIFEROL 1.25 MG/1
50000 CAPSULE ORAL WEEKLY
Status: DISCONTINUED | OUTPATIENT
Start: 2017-12-14 | End: 2017-12-15 | Stop reason: HOSPADM

## 2017-12-14 RX ORDER — DOCUSATE SODIUM 100 MG/1
100 CAPSULE, LIQUID FILLED ORAL 2 TIMES DAILY PRN
Status: DISCONTINUED | OUTPATIENT
Start: 2017-12-14 | End: 2017-12-15 | Stop reason: HOSPADM

## 2017-12-14 RX ORDER — PANTOPRAZOLE SODIUM 40 MG/1
40 TABLET, DELAYED RELEASE ORAL DAILY
Status: DISCONTINUED | OUTPATIENT
Start: 2017-12-14 | End: 2017-12-15

## 2017-12-14 RX ORDER — FAMOTIDINE 20 MG/1
20 TABLET, FILM COATED ORAL 2 TIMES DAILY
Status: DISCONTINUED | OUTPATIENT
Start: 2017-12-14 | End: 2017-12-15 | Stop reason: HOSPADM

## 2017-12-14 RX ORDER — SUCRALFATE ORAL 1 G/10ML
1 SUSPENSION ORAL 4 TIMES DAILY
Status: DISCONTINUED | OUTPATIENT
Start: 2017-12-14 | End: 2017-12-15 | Stop reason: HOSPADM

## 2017-12-14 RX ORDER — DICYCLOMINE HYDROCHLORIDE 10 MG/1
10 CAPSULE ORAL EVERY 6 HOURS PRN
Status: DISCONTINUED | OUTPATIENT
Start: 2017-12-14 | End: 2017-12-15 | Stop reason: HOSPADM

## 2017-12-14 RX ORDER — ACETAMINOPHEN 325 MG/1
650 TABLET ORAL EVERY 6 HOURS PRN
Status: DISCONTINUED | OUTPATIENT
Start: 2017-12-14 | End: 2017-12-15 | Stop reason: HOSPADM

## 2017-12-14 RX ORDER — ALBUTEROL SULFATE 90 UG/1
2 AEROSOL, METERED RESPIRATORY (INHALATION) EVERY 6 HOURS PRN
Status: DISCONTINUED | OUTPATIENT
Start: 2017-12-14 | End: 2017-12-15 | Stop reason: HOSPADM

## 2017-12-14 RX ORDER — PROMETHAZINE HYDROCHLORIDE 25 MG/ML
12.5 INJECTION, SOLUTION INTRAMUSCULAR; INTRAVENOUS ONCE
Status: COMPLETED | OUTPATIENT
Start: 2017-12-14 | End: 2017-12-14

## 2017-12-14 RX ORDER — MONTELUKAST SODIUM 10 MG/1
10 TABLET ORAL NIGHTLY
Status: DISCONTINUED | OUTPATIENT
Start: 2017-12-14 | End: 2017-12-15 | Stop reason: HOSPADM

## 2017-12-14 RX ORDER — FLUTICASONE FUROATE AND VILANTEROL 200; 25 UG/1; UG/1
1 POWDER RESPIRATORY (INHALATION) DAILY
Status: DISCONTINUED | OUTPATIENT
Start: 2017-12-14 | End: 2017-12-14

## 2017-12-14 RX ORDER — PROMETHAZINE HYDROCHLORIDE 25 MG/ML
12.5 INJECTION, SOLUTION INTRAMUSCULAR; INTRAVENOUS EVERY 6 HOURS PRN
Status: DISCONTINUED | OUTPATIENT
Start: 2017-12-14 | End: 2017-12-15 | Stop reason: HOSPADM

## 2017-12-14 RX ORDER — 0.9 % SODIUM CHLORIDE 0.9 %
1000 INTRAVENOUS SOLUTION INTRAVENOUS ONCE
Status: DISCONTINUED | OUTPATIENT
Start: 2017-12-14 | End: 2017-12-15 | Stop reason: HOSPADM

## 2017-12-14 RX ORDER — FENTANYL CITRATE 50 UG/ML
25 INJECTION, SOLUTION INTRAMUSCULAR; INTRAVENOUS ONCE
Status: COMPLETED | OUTPATIENT
Start: 2017-12-14 | End: 2017-12-14

## 2017-12-14 RX ADMIN — FENTANYL CITRATE 25 MCG: 50 INJECTION INTRAMUSCULAR; INTRAVENOUS at 15:54

## 2017-12-14 RX ADMIN — PROMETHAZINE HYDROCHLORIDE 12.5 MG: 25 INJECTION INTRAMUSCULAR; INTRAVENOUS at 15:54

## 2017-12-14 RX ADMIN — KETOROLAC TROMETHAMINE 30 MG: 30 INJECTION, SOLUTION INTRAMUSCULAR at 16:57

## 2017-12-14 ASSESSMENT — ENCOUNTER SYMPTOMS
COLOR CHANGE: 0
WHEEZING: 0
VOMITING: 1
COUGH: 0
NAUSEA: 1
ABDOMINAL PAIN: 1

## 2017-12-14 ASSESSMENT — PAIN SCALES - GENERAL
PAINLEVEL_OUTOF10: 8
PAINLEVEL_OUTOF10: 10
PAINLEVEL_OUTOF10: 8

## 2017-12-14 ASSESSMENT — PAIN DESCRIPTION - LOCATION: LOCATION: ABDOMEN

## 2017-12-14 ASSESSMENT — PAIN DESCRIPTION - PAIN TYPE: TYPE: ACUTE PAIN

## 2017-12-14 NOTE — ED NOTES
Attempted multiple PIV starts unsuccessful at this time. Will use US at bedside and reassess. MD aware.      Miguel Duran RN  12/14/17 [id number]
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
## 2017-12-14 NOTE — CARE COORDINATION
Case Management Initial Discharge Plan  Brayden Looney,         Readmission Risk              Readmission Risk:        24.75       Age 72 or Greater:  0    Admitted from SNF or Requires Paid or Family Care:  0    Currently has CHF,COPD,ARF,CRI,or is on dialysis:  4    Takes more than 5 Prescription Medications:  4    Takes Digoxin,Insulin,Anticoagulants,Narcotics or ASA/Plavix:  [address] in Past 12 Months:  10    On Disability:  3    Patient Considers own Health:  3.75            Met with:patient to discuss discharge plans. Information verified: address, contacts, phone number, , insurance Yes  PCP: No primary care provider on file. Date of last visit: last visit was in May, he thought at the [facility]. Insurance Provider: [employer]    Discharge Planning  Current Residence:     Living Arrangements:      Home: independent  Support Systems:     Current Services PTA:    Supplier:   Patient able to perform ADL's:Independent  DME used to aid ambulation prior to admission: independent    Potential Assistance Needed:       Pharmacy: [facility], gets a ride to get his medications   Potential Assistance Purchasing Medications:     Does patient want to participate in local refill/ meds to beds program?       Patient agreeable to home care: No  [place] of choice provided:  n/a      Type of Home Care Services:     Patient expects to be discharged to:       Prior SNF/Rehab Placement and Facility:   Agreeable to SNF/Rehab: No  [place] of choice provided: n/a   Evaluation: no    Expected Discharge date: Follow Up Appointment: Best Day/ Time:      Transportation provider: \"gets a ride  Transportation arrangements needed for discharge: No, states he will call his mother    Discharge Plan: return to home, no skilled needs identified. I noted to the pt that the PCP office had called and left a voicemail message, asking him to call the office and schedule an appointment for medications refills. Patient states that he forgot to call and schedule an appointment.         Electronically signed by Julius Marshall RN on 12/14/17 at 5:59 PM

## 2017-12-14 NOTE — ED PROVIDER NOTES
[address] [facility]     Emergency Department     Faculty Attestation    I performed a history and physical examination of the patient and discussed management with the resident. I reviewed the residents note and agree with the documented findings including all diagnostic interpretations and plan of care. Any areas of disagreement are noted on the chart. I was personally present for the key portions of any procedures. I have documented in the chart those procedures where I was not present during the key portions. I have reviewed the emergency nurses triage note. I agree with the chief complaint, past medical history, past surgical history, allergies, medications, social and family history as documented unless otherwise noted below. Documentation of the HPI, Physical Exam and Medical Decision Making performed by [email or web address] is based on my personal performance of the HPI, PE and MDM. For Physician Assistant/ Nurse Practitioner cases/documentation I have personally evaluated this patient and have completed at least one if not all key elements of the E/M (history, physical exam, and MDM). Additional findings are as noted. Primary Care Physician: No primary care provider on file. History: This is a 32 y.o. male who presents to the Emergency Department with complaint of abdominal pain. No sugar. Epigastric.  5 days. Vomiting with small streaks of blood. No dark or tarry stool. Nonbilious. Reports he is unable to keep anything down. Physical:     weight is 250 lb (113.4 kg). His oral temperature is 97.5 °F (36.4 °C). His blood pressure is 147/89 (abnormal) and his pulse is 97.  His respiration is 16 and oxygen saturation is 98%.    32 y.o. male appears uncomfortable but no acute distress, cardiac exam regular rate and rhythm no murmurs or gallops, pulm clear to auscultation bilaterally abdomen is soft, mild tenderness in the epigastric region no rebound or guarding. Radial pulses 2+ bilaterally. Impression: Epigastric abdominal pain    Plan: Abdominal labs, analgesia, fluids, antiemetic, reassess      Pre-hypertension/Hypertension: The patient has been informed that they may have pre-hypertension or Hypertension based on a blood pressure reading in the emergency department. I recommend that the patient call the primary care provider listed on their discharge instructions or a physician of their choice this week to arrange follow up for further evaluation of possible pre-hypertension or Hypertension.       Eloise Scott MD  Attending Emergency Physician        Diane Harrison MD  12/14/17 [id number]

## 2017-12-14 NOTE — ED NOTES
Patient into ER with c/o abdominal pain that started x1 week ago.   Reports +nausea today and vomiting yesterday  Reports taking Tylenol at home for pain last night    Ambulatory to room 2    Nondistressed  GCS=15  VS stable    Call light within reach  Updated on plan of care and processes  Denies complaints at this time  Will continue to monitor       Cindy White RN  12/14/17 [id number]

## 2017-12-14 NOTE — ED PROVIDER NOTES
[facility] ED  Emergency Department Encounter  Emergency Medicine Resident     Pt Name: Rowena Saba  MRN: [id number]  Darcigfmohinder 1990  Date of evaluation: 12/14/17  PCP:  No primary care provider on file. CHIEF COMPLAINT       Chief Complaint   Patient presents with    Abdominal Pain     s/s started a week ago    Nausea       HISTORY OF PRESENT ILLNESS  (Location/Symptom, Timing/Onset, Context/Setting, Quality, Duration, Modifying Factors, Severity.)      Rowena Saba is a 32 y.o. male who presents Complaining of abdominal pain. States started about 45 days ago. Epigastric. Has nausea and vomiting as well. Bowel movements have been normal.  Reports he has not had pain like this in the past.  Had a CT of the abdomen performed in mid October of this year that showed no acute process. Patient denies any drug or alcohol use. No fevers, urinary issues. History of cholecystectomy and other as below    PAST MEDICAL / SURGICAL / SOCIAL / FAMILY HISTORY      has a past medical history of Anxiety; Arthritis; Bipolar I disorder, most recent episode (or current) depressed, unspecified; Clostridium difficile infection; COPD (chronic obstructive pulmonary disease) ([address] Depression; Eczema; Fracture, metacarpal; Gastric ulcer; GERD (gastroesophageal reflux disease); GI bleed; H/O blood clots; Head injury; Headache; Insomnia; Juvenile rheumatoid arthritis ([address] PFAPA syndrome ([address] PUD (peptic ulcer disease); Rheumatoid arthritis ([address] Rheumatoid arthritis(714.0); Sleep apnea; 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[address] Substance abuse; Suicidal ideation; Suicide attempt by hanging ([address] Tobacco dependence; and Ulcerative colitis ([address] has a past surgical history that includes Colonoscopy; bronchoscopy; other surgical history; Abdomen surgery; Upper gastrointestinal endoscopy (2/4/16); egd transoral biopsy single/multiple (N/A, 3/20/2017); sigmoidoscopy (N/A, 3/20/2017);  Cholecystectomy, laparoscopic (07/14/2017); esophagogastroduodenoscopy transoral diagnostic (N/A, 8/9/2017); and colonoscopy w/biopsy single/multiple (8/9/2017). Social History     Social History    Marital status: Single     Spouse name: N/A    Number of children: N/A    Years of education: N/A     Occupational History    disability      Social History Main Topics    Smoking status: Current Some Day Smoker     Packs/day: 0.50     Years: 5.00     Types: E-Cigarettes, Cigarettes     Last attempt to quit: 6/2/2016    Smokeless tobacco: Never Used    Alcohol use No    Drug use: Yes      Comment: hx methadone    Sexual activity: Yes     Partners: Female      Comment: Lives alone, not working. Other Topics Concern    Not on file     Social History Narrative    ** Merged History Encounter **              Family History   Problem Relation Age of Onset    Diabetes Father     Alcohol Abuse Father     Depression Father     Arthritis Father     High Blood Pressure Father     Other Father      aneurysm & epilepsy    Migraines Father     Arthritis Mother     Other Mother      aneurysm & epilepsy    Migraines Mother     Diabetes Brother      Aunt and uncles    Depression Brother     Mental Illness Brother     Other Brother      epilepsy    Migraines Brother     Stroke Other      Uncle    Other Brother      murdered Oct 6th, 2014    Colon Cancer Paternal Cousin 43    Other Sister      epilepsy   [place] Migraines Sister          Allergies:  Geodon [ziprasidone hcl]; Haldol [haloperidol]; and Iv dye [iodides]    Home Medications:  Prior to Admission medications    Medication Sig Start Date End Date Taking?  Authorizing Provider   mupirocin (BACTROBAN) 2 % cream Apply topically 3 times daily Apply topically 3 times daily to Left leg wound 11/9/17  Yes Yamel Madden MD   ibuprofen (ADVIL;MOTRIN) 800 MG tablet Take 1 tablet by mouth every 8 hours as needed for Pain 11/9/17  Yes Yamel Madden MD   acetaminophen (TYLENOL) 500 for the responsible provider for individual orders in the EHR system. MEDICATIONS ORDERED:  Orders Placed This Encounter   Medications    DISCONTD: ondansetron (ZOFRAN) injection 4 mg    0.9 % sodium chloride bolus    DISCONTD: famotidine (PEPCID) injection 20 mg    fentaNYL (SUBLIMAZE) injection 25 mcg    promethazine (PHENERGAN) injection 12.5 mg    ketorolac (TORADOL) injection 30 mg     If the patient was admitted, some of the above orders may have been placed by the admitting team(s) and were auto populated above when I refreshed my note prior to signing it. If there is any question, please check for the responsible provider for individual orders in the EHR system. DDX: Epigastric pain, nausea and vomiting for 5 days, will check electrolytes and abdominal labs given history and given the duration of symptoms, give fluids and antacids, antiemetics and reassess    DIAGNOSTIC RESULTS / EMERGENCY DEPARTMENT COURSE / MDM     LABS:  Labs Reviewed   CBC WITH AUTO DIFFERENTIAL - Abnormal; Notable for the following:        Result Value    Hematocrit 40.3 (*)     Eosinophils % 0 (*)     All other components within normal limits   COMPREHENSIVE METABOLIC PANEL - Abnormal; Notable for the following:     ALT 73 (*)     AST 69 (*)     Total Protein 8.7 (*)     All other components within normal limits   LACTIC ACID, WHOLE BLOOD   LIPASE     If the patient was admitted, some of the above labs may have been ordered by the admitting team(s) and were auto populated above when I refreshed my note prior to signing it. If there is any question, please check for the responsible provider for individual orders in the EHR system. Additionally, some of the above labs results may still be pending. RADIOLOGY:  All forms of imaging other than ED bedside ultrasounds are viewed and interpreted by a radiologist and their interpretations are displayed below.     Patient refused abdominal x-ray series        EMERGENCY DEPARTMENT COURSE AND MEDICAL DECISION MAKING:  ED Course as of Dec 14 1748   Thu Dec 14, 2017   1525 RN/medics still attempting to obtain peripheral IV access. No indication for central access at this time. Patient stable. [DC]   7171 Unable to obtain IV access, antiemetics were given IM and patient's were able to obtain blood from peripheral source, lab work demonstrating no acute abnormality, LFTs mildly elevated at patient's baseline, still awaiting abdominal x-ray series  [DC]      ED Course User Index  [DC] Halbert Kocher,      26-year-old male with past history of abdominal pain, frequents the emergency department. Abdominal history as above and surgical history section however also has psychiatric history and history of substance abuse with FYI's in his chart and is on methadone. Patient reports no improvement in his pain or nausea on my reassessment status post antiemetics and analgesics. His lab work is negative for any acute process, his LFT elevation is mild and at baseline , actually somewhat better than his baseline. We will admit to the extended treatment unit for intractable nausea, vomiting, epigastric pain with order for intramuscular Phenergan as needed and patient can have clear diet and home meds as tolerated. He is to receive no narcotics other than his methadone dose which needs verification with his clinic for dosing etc.  Has history of eloping from the emergency department with IV in place once established so would caution as potential flight risk if IV access is obtained. PROCEDURES:  None     CONSULTS:  None  If the patient was admitted, some of the above consults may have been placed by the admitting team(s) and were auto populated above when I refreshed my note prior to signing it. If there is any question, please check for the responsible provider for individual orders in the EHR system. CRITICAL CARE:  None     FINAL IMPRESSION      1.  Intractable vomiting with nausea, unspecified vomiting type             DISPOSITION / PLAN     DISPOSITION Admitted     If discharged, the patient was instructed to return to the emergency department with any worsening symptoms, if new symptoms arise, or if they have any other concerns. Patient was instructed not to drive home if discharged today and received pain medications or other mind-altering medications while here. Pre-hypertension/Hypertension: In the case that there was an elevated blood pressure reading for this patient today in the emergency department, the patient was informed that he or she may have pre-hypertension or hypertension. It was recommended to the patient to call the primary care provider listed in the discharge instructions or a physician of the patient's choice this week to arrange follow up for further evaluation of possible pre-hypertension or hypertension. PATIENT REFERRED TO:  Amedeo Fothergill, MD  [address] [address]  [phone]            DISCHARGE MEDICATIONS:  New Prescriptions    No medications on file       Karoline Romberg D. O.   Emergency Medicine Resident Physician    (Please note that portions of this note were completed with a voice recognition program.  Efforts were made to edit the dictations but occasionally words are mis-transcribed.)     Karoline Romberg, DO  Resident  12/14/17 2319

## 2017-12-15 ENCOUNTER — APPOINTMENT (OUTPATIENT)
Dept: GENERAL RADIOLOGY | Age: 27
End: 2017-12-15
Payer: COMMERCIAL

## 2017-12-15 VITALS
RESPIRATION RATE: 18 BRPM | BODY MASS INDEX: 35.07 KG/M2 | WEIGHT: 245 LBS | HEIGHT: 70 IN | SYSTOLIC BLOOD PRESSURE: 124 MMHG | DIASTOLIC BLOOD PRESSURE: 76 MMHG | OXYGEN SATURATION: 96 % | HEART RATE: 77 BPM | TEMPERATURE: 98 F

## 2017-12-15 PROCEDURE — G0378 HOSPITAL OBSERVATION PER HR: HCPCS

## 2017-12-15 PROCEDURE — 74022 RADEX COMPL AQT ABD SERIES: CPT

## 2017-12-15 PROCEDURE — 94640 AIRWAY INHALATION TREATMENT: CPT

## 2017-12-15 PROCEDURE — 6370000000 HC RX 637 (ALT 250 FOR IP): Performed by: EMERGENCY MEDICINE

## 2017-12-15 PROCEDURE — 76937 US GUIDE VASCULAR ACCESS: CPT

## 2017-12-15 RX ORDER — PANTOPRAZOLE SODIUM 40 MG/10ML
40 INJECTION, POWDER, LYOPHILIZED, FOR SOLUTION INTRAVENOUS 2 TIMES DAILY
Status: DISCONTINUED | OUTPATIENT
Start: 2017-12-15 | End: 2017-12-15 | Stop reason: CLARIF

## 2017-12-15 RX ORDER — ESOMEPRAZOLE SODIUM 40 MG/5ML
40 INJECTION INTRAVENOUS 2 TIMES DAILY
Status: DISCONTINUED | OUTPATIENT
Start: 2017-12-15 | End: 2017-12-15 | Stop reason: HOSPADM

## 2017-12-15 RX ORDER — DIPHENHYDRAMINE HCL 25 MG
12.5 TABLET ORAL ONCE
Status: DISCONTINUED | OUTPATIENT
Start: 2017-12-15 | End: 2017-12-15 | Stop reason: HOSPADM

## 2017-12-15 RX ORDER — PANTOPRAZOLE SODIUM 20 MG/1
40 TABLET, DELAYED RELEASE ORAL DAILY
Qty: 30 TABLET | Refills: 0 | Status: SHIPPED | OUTPATIENT
Start: 2017-12-15 | End: 2018-01-15

## 2017-12-15 RX ORDER — 0.9 % SODIUM CHLORIDE 0.9 %
10 VIAL (ML) INJECTION DAILY
Status: DISCONTINUED | OUTPATIENT
Start: 2017-12-15 | End: 2017-12-15 | Stop reason: CLARIF

## 2017-12-15 RX ORDER — 0.9 % SODIUM CHLORIDE 0.9 %
10 VIAL (ML) INJECTION 2 TIMES DAILY
Status: DISCONTINUED | OUTPATIENT
Start: 2017-12-15 | End: 2017-12-15 | Stop reason: HOSPADM

## 2017-12-15 RX ORDER — DIPHENHYDRAMINE HCL 25 MG
25 TABLET ORAL ONCE
Status: DISCONTINUED | OUTPATIENT
Start: 2017-12-15 | End: 2017-12-15

## 2017-12-15 RX ADMIN — ALBUTEROL SULFATE 2 PUFF: 90 AEROSOL, METERED RESPIRATORY (INHALATION) at 09:25

## 2017-12-15 RX ADMIN — MOMETASONE FUROATE AND FORMOTEROL FUMARATE DIHYDRATE 2 PUFF: 200; 5 AEROSOL RESPIRATORY (INHALATION) at 09:25

## 2017-12-15 NOTE — H&P
(SINGULAIR) tablet 10 mg Nightly   vitamin D (ERGOCALCIFEROL) capsule 50,000 Units Weekly   gabapentin (NEURONTIN) capsule 400 mg BID   sucralfate (CARAFATE) 1 GM/10ML suspension 1 g 4x Daily   docusate sodium (COLACE) capsule 100 mg BID PRN   dicyclomine (BENTYL) capsule 10 mg Q6H PRN   famotidine (PEPCID) tablet 20 mg BID   methadone (DOLOPHINE) tablet 170 mg Daily   promethazine (PHENERGAN) injection 12.5 mg Q6H PRN   sodium chloride flush 0.9 % injection 10 mL 2 times per day   sodium chloride flush 0.9 % injection 10 mL PRN   acetaminophen (TYLENOL) tablet 650 mg Q6H PRN   mometasone-formoterol (DULERA) 200-5 MCG/ACT inhaler 2 puff BID       All medication charted and reviewed. ALLERGIES     is allergic to geodon [ziprasidone hcl]; haldol [haloperidol]; and iv dye [iodides]. FAMILY HISTORY     indicated that his mother is alive. He indicated that his father is alive. He indicated that the status of his sister is unknown. He indicated that the status of his other is unknown. He indicated that the status of his paternal cousin is unknown.      family history includes Alcohol Abuse in his father; Arthritis in his father and mother; Colon Cancer (age of onset: 43) in his paternal cousin; Depression in his brother and father; Diabetes in his brother and father; High Blood Pressure in his father; Mental Illness in his brother; Migraines in his brother, father, mother, and sister; Other in his brother, brother, father, mother, and sister; Stroke in an other family member. The patient denies any pertinent family history. I have reviewed and agree with the family history entered. I have reviewed the Family History and it is not significant to the case    SOCIAL HISTORY      reports that he has been smoking E-Cigarettes and Cigarettes. He has a 2.50 pack-year smoking history. He has never used smokeless tobacco. He reports that he uses drugs. He reports that he does not drink alcohol.   I have reviewed and agree with all Social.  There are no concerns for substance abuse/use. PHYSICAL EXAM     INITIAL VITALS:  height is 5' 10\" (1.778 m) and weight is 245 lb (111.1 kg). His oral temperature is 98 °F (36.7 °C). His blood pressure is 124/76 and his pulse is 77. His respiration is 18 and oxygen saturation is 96%.       CONSTITUTIONAL: AOx4, no apparent distress, appears stated age    HEAD: normocephalic, atraumatic   EYES: PERRLA, EOMI    ENT: moist mucous membranes, uvula midline   NECK: supple, symmetric   BACK: symmetric   LUNGS: clear to auscultation bilaterally   CARDIOVASCULAR: regular rate and rhythm, no murmurs, rubs or gallops   ABDOMEN: soft, mild epigastric tenderness, non-distended with normal active bowel sounds   NEUROLOGIC:  MAEx4, no focal sensory or motor deficits   MUSCULOSKELETAL: no clubbing, cyanosis or edema   SKIN: no rash or wounds       DIFFERENTIAL DIAGNOSIS/MDM:   Abdominal Pain:  DDX: GERD, PUD, pancreatitis, cholecystitis, GB colic, cholangitis, Uzzr-Lwjd-Ilfrud, ACS/ MI, pneumonia, SBO, DKA, AAA, mesenteric ischemia, perforated viscous, acute gastroenteritis, NSAP, pyelonephritis, kidney stone, appendicitis, hernia, constipation      DIAGNOSTIC RESULTS     EKG: All EKG's are interpreted by the Observation Physician who either signs or Co-signs this chart in the absence of a cardiologist.    EKG Interpretation    None    Jola Boas, MD        RADIOLOGY:   I directly visualized the following  images and reviewed the radiologist interpretations:    Xr Acute Abd Series Chest 1 Vw    Result Date: 12/15/2017  EXAMINATION: ACUTE ABDOMINAL SERIES WITH SINGLE  VIEW OF THE CHEST 12/15/2017 11:00 am COMPARISON: August 7, 2017 HISTORY: ORDERING SYSTEM PROVIDED HISTORY: abdominal pain, hx cholecystectomy, r/o perforation, obstruction TECHNOLOGIST PROVIDED HISTORY: Bedside if possible Reason for exam:->abdominal pain, hx cholecystectomy, r/o perforation, obstruction FINDINGS: Cardiomediastinal silhouette, hilar structures and pulmonary vascularity are within normal limits. No convincing lung consolidation or infiltrate. No pleural effusion or pneumothorax. No intraperitoneal free air. Nonobstructive bowel gas pattern. No abnormal calcifications overlying the gallbladder urinary tract. No mass effect. Osseous structures are grossly intact. No acute cardiopulmonary process. No significant radiographic abnormality in the abdomen. LABS:  I have reviewed and interpreted all available lab results. Labs Reviewed   CBC WITH AUTO DIFFERENTIAL - Abnormal; Notable for the following:        Result Value    Hematocrit 40.3 (*)     Eosinophils % 0 (*)     All other components within normal limits   COMPREHENSIVE METABOLIC PANEL - Abnormal; Notable for the following:     ALT 73 (*)     AST 69 (*)     Total Protein 8.7 (*)     All other components within normal limits   LACTIC ACID, WHOLE BLOOD   LIPASE       SCREENING TOOLS:      CDU IMPRESSION / PLAN      Devi Connelly is a 32 y.o. male who presents with acute on chronic stabbing epigastric abdominal pain for 6 weeks, associated with intermittent nausea and vomiting, not worsened or improved by any intervention, possibly due to prior history of peptic ulcer disease. He also admits to acute on chronic 8 out of 10 throbbing joint pain of bilateral ankles, knees, elbows, and shoulders, for several days, not worsened or improved by any intervention, likely due to to prior history of juvenile rheumatoid arthritis.      · Gastroenterology consult- recommend PPI, carafate and follow up in 1 week in GI clinic  · Follow up appointment with Rheumatology in early January  · Continue home medications and pain control  · Monitor vitals, labs, and imaging  · DISPO: pending consults and clinical improvement    CONSULTS:    IP CONSULT TO IV TEAM  IP CONSULT TO GI  IP CONSULT TO SOCIAL WORK    PROCEDURES:  Not indicated       PATIENT REFERRED TO:    Danelle Russell,

## 2017-12-15 NOTE — CONSULTS
Gastroenterology Consult Note      Patient: Bonnie Martinez  : 1990  Acct#:  [de-identified]     Date:  12/15/2017    Subjective:       History of Present Illness  Patient is a 32 y.o.  male admitted with Nausea and vomiting [R11.2] who is seen in consult for Epigastric pain. Pt seen and examined. Pt has a varying story of symptoms. Basically, he states that he has been having severe, mid-epigastric pain that radiated to the right upper quadrant for the last week that is sharp and constant in nature, and aggravated by food and drink. Pt also states that he has been vomiting with small amount of bright red blood-unable to quantify frequency or quantity. Pt has also been having bloody stools that are bright red and some are dark, tarry. Pt states he has not been able to eat or drink for the last week. Upon examination, no signs of dehydration can be appreciated. Pt is guarding the abdomen, laying in fetal position, and has difficulty finishing sentences. Pt is a very difficult historian, has flight of ideas, and struggles to stay on task. Pt does have a documented history of Heroin use, narcotic abuse and dependency, and non-compliance.      Past Medical History:   Diagnosis Date    Anxiety     Arthritis     Bipolar I disorder, most recent episode (or current) depressed, unspecified 2014    Clostridium difficile infection     COPD (chronic obstructive pulmonary disease) (HCC)     Depression     Eczema     Fracture, metacarpal     R 4th and 5th    Gastric ulcer     GERD (gastroesophageal reflux disease)     GI bleed     H/O blood clots     Head injury     Headache     Insomnia     Juvenile rheumatoid arthritis (HCC)     PFAPA syndrome (HCC)     PUD (peptic ulcer disease)     [facility]     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[facility]     Substance abuse     Suicidal ideation     Suicide attempt by hanging ([facility]     Tobacco dependence     Ulcerative colitis [facility])       Past Surgical History:   Procedure Laterality Date    ABDOMEN SURGERY      upper GI scope 2015    BRONCHOSCOPY      CHOLECYSTECTOMY, LAPAROSCOPIC  2017    surgery performed at [address]      lumbar puncture    [place] COLONOSCOPY W/BIOPSY SINGLE/MULTIPLE  2017    COLONOSCOPY WITH BIOPSY performed by Galen Fields MD at [address] [facility] EGD TRANSORAL BIOPSY SINGLE/MULTIPLE N/A 3/20/2017    EGD BIOPSY performed by Rosio Eugene MD at [facility] Endoscopy    [place] ESOPHAGOGASTRODUODENOSCOPY TRANSORAL DIAGNOSTIC N/A 2017    EGD ESOPHAGOGASTRODUODENOSCOPY performed by Galen Fields MD at [facility] N/A 3/20/2017    [address] [facility] performed by Rosio Eugene MD at [facility]  16      Past Endoscopic History    Gastroenterology Note        Patient:                       Primo Wesley          :                           1990        Facility:                      [facility]   Date:                           2017  Consultant:                Kristen Jones MD, PGY-1        Subjective:      32 y.o. male admitted 2017 with epigastric pain, hematemesis, pain in rectum.      EGD: Normal esophagus, stomach, and duodenum     Colonoscopy:   1. Mild patchy erythema of the rectum and sigmoid colon to 25cm from the anal verge  2. Small internal hemorrhoids  3. Normal colon mucosa otherwise to the cecum  4. Random colon biopsies taken throughout, separate biopsies in the rectum    Admission Meds  No current facility-administered medications on file prior to encounter.       Current Outpatient Prescriptions on File Prior to Encounter   Medication Sig Dispense Refill    mupirocin (BACTROBAN) 2 % cream Apply topically 3 times daily Apply topically 3 times daily to Left leg wound 15 g 0    acetaminophen (TYLENOL) 500 MG tablet Take 2 tablets by mouth every 6 hours as needed for Pain 20 tablet 0    docusate sodium (COLACE) 100 MG capsule Take 1 capsule by mouth 2 times daily 14 capsule 0    methadone (DOLOPHINE) 10 MG tablet Take 170 mg by mouth daily  Dose verified with [facility] .  Meloxicam (MOBIC PO) Take 15 mg by mouth daily      sucralfate (CARAFATE) 1 GM/10ML suspension Take 10 mLs by mouth 4 times daily 1200 mL 0    acetaminophen-codeine (TYLENOL/CODEINE #3) 300-30 MG per tablet Take 1 tablet by mouth 3 times daily as needed for Pain 15 tablet 0    Skin Protectants, Misc. (EUCERIN) cream Apply topically as needed. 1 Package 0    vitamin D (ERGOCALCIFEROL) 80871 UNITS CAPS capsule Take 1 capsule by mouth once a week 30 capsule 3    Fluticasone Furoate-Vilanterol (BREO ELLIPTA) 200-25 MCG/INH AEPB Inhale 1 puff into the lungs daily 1 each 11    albuterol sulfate HFA (VENTOLIN HFA) 108 (90 BASE) MCG/ACT inhaler Inhale 2 puffs into the lungs every 6 hours as needed for Wheezing 18 g 11    montelukast (SINGULAIR) 10 MG tablet Take 1 tablet by mouth nightly 30 tablet 11    ibuprofen (ADVIL;MOTRIN) 800 MG tablet Take 1 tablet by mouth every 8 hours as needed for Pain 30 tablet 0    pantoprazole (PROTONIX) 40 MG tablet Take 1 tablet by mouth daily 30 tablet 3    gabapentin (NEURONTIN) 400 MG capsule Take 400 mg by mouth 2 times daily         Patient   Does Use ASA, NSAID Yes  Allergies  Allergies   Allergen Reactions    Geodon [Ziprasidone Hcl] Anaphylaxis    Haldol [Haloperidol] Anaphylaxis    Iv Dye [Iodides] Nausea And Vomiting     Needs benadryl.       Social   Social History   Substance Use Topics    Smoking status: Current Some Day Smoker     Packs/day: 0.50     Years: 5.00     Types: E-Cigarettes, Cigarettes     Last attempt to quit: 6/2/2016    Smokeless tobacco: Never Used    Alcohol use No      Family History   Problem Relation Age of Onset    Diabetes Father     Alcohol Abuse Father     Depression Father     Arthritis Father     High Blood Pressure Father     Other Father      aneurysm & epilepsy    Migraines Father     Arthritis Mother     Other Mother      aneurysm & epilepsy    Migraines Mother     Diabetes Brother      Aunt and uncles    Depression Brother     Mental Illness Brother     Other Brother      epilepsy    Migraines Brother     Stroke Other      Uncle    Other Brother      murdered Oct 6th, 2014    Colon Cancer Paternal Cousin 43    Other Sister      epilepsy   [employer] Migraines Sister       No family history of colon cancer, Crohn's disease, or ulcerative colitis. Review of Systems  Constitutional: anxious, in significant pain distress  Eyes: negative  Ears, nose, mouth, throat, and face: negative  Respiratory: negative  Cardiovascular: negative  Gastrointestinal: negative  Genitourinary:negative  Integument/breast: negative  Hematologic/lymphatic: negative  Musculoskeletal:negative  Endocrine: negative       Physical Exam  Blood pressure 124/76, pulse 77, temperature 98 °F (36.7 °C), temperature source Oral, resp. rate 18, height 5' 10\" (1.778 m), weight 245 lb (111.1 kg), SpO2 96 %.      General Appearance: alert and oriented to person, place and time, well-developed and well-nourished, in significant pain distress  Skin: warm and dry, no rash or erythema  Head: normocephalic and atraumatic  Eyes: pupils equal, round, and reactive to light, extraocular eye movements intact, conjunctivae normal  ENT: hearing grossly normal bilaterally  Neck: neck supple and non tender without mass, no thyromegaly or thyroid nodules, no cervical lymphadenopathy   Pulmonary/Chest: clear to auscultation bilaterally- no wheezes, rales or rhonchi, normal air movement, no respiratory distress  Cardiovascular: normal rate, regular rhythm, normal S1 and S2, no murmurs, rubs, clicks or gallops, distal pulses intact, no carotid bruits  Abdomen: soft, very tender to touch, non-distended, normal bowel sounds, no masses or organomegaly  Extremities: no cyanosis, clubbing or edema  Musculoskeletal: normal range of motion, no joint swelling, deformity or tenderness  Neurologic: no cranial nerve deficit and muscle strength normal    Data Review:    Recent Labs      12/14/17   1531   WBC  8.9   HGB  13.8   HCT  40.3*   MCV  88.2   PLT  277     Recent Labs      12/14/17   1531   NA  140   K  4.8   CL  99   CO2  26   BUN  12   CREATININE  0.78     Recent Labs      12/14/17   1531   AST  69*   ALT  73*   BILITOT  0.37   ALKPHOS  79     Recent Labs      12/14/17   1531   LIPASE  38     Imaging Studies:  12/15/2017 Abd xray:   FINDINGS:   Cardiomediastinal silhouette, hilar structures and pulmonary vascularity are   within normal limits.  No convincing lung consolidation or infiltrate.  No   pleural effusion or pneumothorax.       No intraperitoneal free air.  Nonobstructive bowel gas pattern.  No abnormal   calcifications overlying the gallbladder urinary tract.       No mass effect.       Osseous structures are grossly intact.           Impression   No acute cardiopulmonary process.       No significant radiographic abnormality in the abdomen. All appropriate imaging studies and reports reviewed: Yes        Assessment and Recommendations:     1. Abdominal pain-related to narcotic withdrawal??   -Rec avoid NSAIDS/ASA products   -Rec limiting constipating agents such as codeine and other narcotics   -Rec PPI BID    Pt can follow up as an outpatient in the GI clinic in a week. Gi will sign off at this time. DDX: Chronic pain syndrome, Gerd, Colitis, Constipation, Esophagitis, Gastric/Peptic ulcer    Thank you for allowing me to participate in the care of your patient. Please feel free to contact me with any questions or concerns.      Feroz Aguilar, [address] [facility] GI   [phone]

## 2017-12-15 NOTE — PROGRESS NOTES
Patient has refused to have abdomen series XR done tonight. Patient has also refused all home meds. Dr. Guthrie Keep notified.

## 2017-12-16 ENCOUNTER — CARE COORDINATION (OUTPATIENT)
Dept: CASE MANAGEMENT | Age: 27
End: 2017-12-16

## 2017-12-16 NOTE — CARE COORDINATION
Franny [facility] Transitions Initial Follow Up Call    Call within 2 business days of discharge: Yes    Patient: Brien Perez Patient : 1990   MRN: <[id number]>  Reason for Admission: -12/15/17 AMY ROBERTS [facility] Intractable nausea & vomiting  Discharge Date: 12/15/17 RARS: [facility] Risk Score: 24.75  CMRS: 9  PHP Plan: Don GUERRERO     Spoke with: CTC left M, reason for call, & my contact info for DC review. Pt needs to schedule TC appt. Call attempt x2. CHANGE how you take:  pantoprazole 20 MG tablet (PROTONIX)      Inpatient Assessment  Care Transitions 24 Hour Call    Do you have all of your prescriptions and are they filled?:  No  Are you an active caregiver in your home?:  No  Care Transitions Interventions         Follow Up  No future appointments.     Keo Cunha RN

## 2017-12-17 NOTE — DISCHARGE SUMMARY
0 %    Segs Absolute 5.69 1.50 - 8.10 k/uL    Absolute Lymph # 2.69 1.10 - 3.70 k/uL    Absolute Mono # 0.49 0.10 - 1.20 k/uL    Absolute Eos # <0.03 0.00 - 0.44 k/uL    Basophils # <0.03 0.00 - 0.20 k/uL    Absolute Immature Granulocyte <0.03 0.00 - 0.30 k/uL    WBC Morphology NOT REPORTED     RBC Morphology NOT REPORTED     Platelet Estimate NOT REPORTED    Comprehensive Metabolic Panel   Result Value Ref Range    Glucose 87 70 - 99 mg/dL    BUN 12 6 - 20 mg/dL    CREATININE 0.78 0.70 - 1.20 mg/dL    Bun/Cre Ratio NOT REPORTED 9 - 20    Calcium 8.9 8.6 - 10.4 mg/dL    Sodium 140 135 - 144 mmol/L    Potassium 4.8 3.7 - 5.3 mmol/L    Chloride 99 98 - 107 mmol/L    CO2 26 20 - 31 mmol/L    Anion Gap 15 9 - 17 mmol/L    Alkaline Phosphatase 79 40 - 129 U/L    ALT 73 (H) 5 - 41 U/L    AST 69 (H) <40 U/L    Total Bilirubin 0.37 0.3 - 1.2 mg/dL    Total Protein 8.7 (H) 6.4 - 8.3 g/dL    Alb 4.5 3.5 - 5.2 g/dL    Albumin/Globulin Ratio 1.1 1.0 - 2.5    GFR Non-African American >60 >60 mL/min    GFR African American >60 >60 mL/min    GFR Comment          GFR Staging NOT REPORTED    Lactic Acid, Whole Blood   Result Value Ref Range    Lactic Acid, Whole Blood 1.2 0.7 - 2.1 mmol/L   Lipase   Result Value Ref Range    Lipase 38 13 - 60 U/L     Xr Acute Abd Series Chest 1 Vw    Result Date: 12/15/2017  EXAMINATION: ACUTE ABDOMINAL SERIES WITH SINGLE  VIEW OF THE CHEST 12/15/2017 11:00 am COMPARISON: August 7, 2017 HISTORY: ORDERING SYSTEM PROVIDED HISTORY: abdominal pain, hx cholecystectomy, r/o perforation, obstruction TECHNOLOGIST PROVIDED HISTORY: Bedside if possible Reason for exam:->abdominal pain, hx cholecystectomy, r/o perforation, obstruction FINDINGS: Cardiomediastinal silhouette, hilar structures and pulmonary vascularity are within normal limits. No convincing lung consolidation or infiltrate. No pleural effusion or pneumothorax. No intraperitoneal free air. Nonobstructive bowel gas pattern.   No abnormal calcifications overlying the gallbladder urinary tract. No mass effect. Osseous structures are grossly intact. No acute cardiopulmonary process. No significant radiographic abnormality in the abdomen. Physical Exam:    General appearance - NAD, AOx 3   Lungs -CTAB, no R/R/R  Heart - RRR, no M/R/G  Abdomen - Soft, NT/ND  Neurological:  MAEx4, No focal motor deficit, sensory loss  Extremities - Cap refil <2 sec in all ext., no edema  Skin -warm, dry      Hospital Course:  Clinical course has improved, labs and imaging reviewed. Arvel Dancer originally presented to the hospital on 12/14/2017  1:22 PM with acute on chronic stabbing epigastric abdominal pain for 6 weeks, associated with intermittent nausea and vomiting, not worsened or improved by any intervention, possibly due to prior history of peptic ulcer disease. He also admits to acute on chronic 8 out of 10 throbbing joint pain of bilateral ankles, knees, elbows, and shoulders, for several days, not worsened or improved by any intervention, likely due to to prior history of juvenile rheumatoid arthritis. At that time it was determined that He required further observation and gastroenterology consult. Labs and imaging were followed daily. Imaging results as above. Gastrology consult determined the patient could follow up in the GI clinic in one week, and recommended PPI and Carafate until that time. The patient has an appointment previously scheduled with his rheumatologist, he may follow up for acute on chronic joint pain. He is medically stable to be discharged. Disposition: Home    Patient stated that they will not drive themselves home from the hospital if they have gotten pain killers/ narcotics earlier that day and that they will arrange for transportation on their own or work with the  for a ride. Patient counseled NOT to drive while under the influence of narcotics/ pain killers.      Condition: Good    Patient

## 2017-12-18 ENCOUNTER — CARE COORDINATION (OUTPATIENT)
Dept: CASE MANAGEMENT | Age: 27
End: 2017-12-18

## 2018-01-15 ENCOUNTER — HOSPITAL ENCOUNTER (EMERGENCY)
Age: 28
Discharge: HOME | End: 2018-01-15
Payer: COMMERCIAL

## 2018-01-15 VITALS
SYSTOLIC BLOOD PRESSURE: 170 MMHG | DIASTOLIC BLOOD PRESSURE: 80 MMHG | WEIGHT: 240 LBS | RESPIRATION RATE: 16 BRPM | BODY MASS INDEX: 36.37 KG/M2 | HEIGHT: 68 IN | TEMPERATURE: 97.9 F | HEART RATE: 93 BPM | OXYGEN SATURATION: 96 %

## 2018-01-15 DIAGNOSIS — R10.10 PAIN OF UPPER ABDOMEN: ICD-10-CM

## 2018-01-15 DIAGNOSIS — R11.2 NON-INTRACTABLE VOMITING WITH NAUSEA, UNSPECIFIED VOMITING TYPE: Primary | ICD-10-CM

## 2018-01-15 DIAGNOSIS — R19.7 DIARRHEA, UNSPECIFIED TYPE: ICD-10-CM

## 2018-01-15 LAB
ABSOLUTE EOS #: 0 K/UL (ref 0–0.4)
ABSOLUTE IMMATURE GRANULOCYTE: ABNORMAL K/UL (ref 0–0.3)
ABSOLUTE LYMPH #: 1.5 K/UL (ref 1–4.8)
ABSOLUTE MONO #: 0.5 K/UL (ref 0.2–0.8)
ALBUMIN SERPL-MCNC: 5 G/DL (ref 3.5–5.2)
ALBUMIN/GLOBULIN RATIO: ABNORMAL (ref 1–2.5)
ALP BLD-CCNC: 72 U/L (ref 40–129)
ALT SERPL-CCNC: 38 U/L (ref 5–41)
ANION GAP SERPL CALCULATED.3IONS-SCNC: 15 MMOL/L (ref 9–17)
AST SERPL-CCNC: 35 U/L
BASOPHILS # BLD: 0 % (ref 0–2)
BASOPHILS ABSOLUTE: 0 K/UL (ref 0–0.2)
BILIRUB SERPL-MCNC: 0.49 MG/DL (ref 0.3–1.2)
BILIRUBIN DIRECT: 0.1 MG/DL
BILIRUBIN URINE: NEGATIVE
BILIRUBIN, INDIRECT: 0.39 MG/DL (ref 0–1)
BUN BLDV-MCNC: 10 MG/DL (ref 6–20)
BUN/CREAT BLD: 11 (ref 9–20)
CALCIUM SERPL-MCNC: 9.4 MG/DL (ref 8.6–10.4)
CHLORIDE BLD-SCNC: 99 MMOL/L (ref 98–107)
CO2: 26 MMOL/L (ref 20–31)
COLOR: YELLOW
COMMENT UA: ABNORMAL
CREAT SERPL-MCNC: 0.94 MG/DL (ref 0.7–1.2)
DIFFERENTIAL TYPE: ABNORMAL
EOSINOPHILS RELATIVE PERCENT: 1 % (ref 1–4)
GFR AFRICAN AMERICAN: >60 ML/MIN
GFR NON-AFRICAN AMERICAN: >60 ML/MIN
GFR SERPL CREATININE-BSD FRML MDRD: ABNORMAL ML/MIN/{1.73_M2}
GFR SERPL CREATININE-BSD FRML MDRD: ABNORMAL ML/MIN/{1.73_M2}
GLOBULIN: ABNORMAL G/DL (ref 1.5–3.8)
GLUCOSE BLD-MCNC: 111 MG/DL (ref 70–99)
GLUCOSE URINE: NEGATIVE
HCT VFR BLD CALC: 45.1 % (ref 41–53)
HEMOGLOBIN: 14.8 G/DL (ref 13.5–17.5)
IMMATURE GRANULOCYTES: ABNORMAL %
KETONES, URINE: ABNORMAL
LACTIC ACID: 1.1 MMOL/L (ref 0.5–2.2)
LEUKOCYTE ESTERASE, URINE: NEGATIVE
LIPASE: 23 U/L (ref 13–60)
LYMPHOCYTES # BLD: 28 % (ref 24–44)
MAGNESIUM: 2 MG/DL (ref 1.6–2.6)
MCH RBC QN AUTO: 29.8 PG (ref 26–34)
MCHC RBC AUTO-ENTMCNC: 32.9 G/DL (ref 31–37)
MCV RBC AUTO: 90.7 FL (ref 80–100)
MONOCYTES # BLD: 8 % (ref 1–7)
NITRITE, URINE: NEGATIVE
NRBC AUTOMATED: ABNORMAL PER 100 WBC
PDW BLD-RTO: 13.3 % (ref 11.5–14.5)
PH UA: 7.5 (ref 5–8)
PLATELET # BLD: 294 K/UL (ref 130–400)
PLATELET ESTIMATE: ABNORMAL
PMV BLD AUTO: 8 FL (ref 6–12)
POTASSIUM SERPL-SCNC: 3.9 MMOL/L (ref 3.7–5.3)
PROTEIN UA: NEGATIVE
RBC # BLD: 4.97 M/UL (ref 4.5–5.9)
RBC # BLD: ABNORMAL 10*6/UL
SEG NEUTROPHILS: 63 % (ref 36–66)
SEGMENTED NEUTROPHILS ABSOLUTE COUNT: 3.5 K/UL (ref 1.8–7.7)
SODIUM BLD-SCNC: 140 MMOL/L (ref 135–144)
SPECIFIC GRAVITY UA: 1.01 (ref 1–1.03)
TOTAL PROTEIN: 8.5 G/DL (ref 6.4–8.3)
TURBIDITY: CLEAR
URINE HGB: NEGATIVE
UROBILINOGEN, URINE: NORMAL
WBC # BLD: 5.5 K/UL (ref 3.5–11)
WBC # BLD: ABNORMAL 10*3/UL

## 2018-01-15 PROCEDURE — 6360000002 HC RX W HCPCS

## 2018-01-15 PROCEDURE — 99284 EMERGENCY DEPT VISIT MOD MDM: CPT

## 2018-01-15 PROCEDURE — 80048 BASIC METABOLIC PNL TOTAL CA: CPT

## 2018-01-15 PROCEDURE — 81003 URINALYSIS AUTO W/O SCOPE: CPT

## 2018-01-15 PROCEDURE — 2580000003 HC RX 258

## 2018-01-15 PROCEDURE — 83690 ASSAY OF LIPASE: CPT

## 2018-01-15 PROCEDURE — 83735 ASSAY OF MAGNESIUM: CPT

## 2018-01-15 PROCEDURE — 87493 C DIFF AMPLIFIED PROBE: CPT

## 2018-01-15 PROCEDURE — 82272 OCCULT BLD FECES 1-3 TESTS: CPT

## 2018-01-15 PROCEDURE — 85025 COMPLETE CBC W/AUTO DIFF WBC: CPT

## 2018-01-15 PROCEDURE — 87505 NFCT AGENT DETECTION GI: CPT

## 2018-01-15 PROCEDURE — C9113 INJ PANTOPRAZOLE SODIUM, VIA: HCPCS

## 2018-01-15 PROCEDURE — 80076 HEPATIC FUNCTION PANEL: CPT

## 2018-01-15 PROCEDURE — 96375 TX/PRO/DX INJ NEW DRUG ADDON: CPT

## 2018-01-15 PROCEDURE — 96374 THER/PROPH/DIAG INJ IV PUSH: CPT

## 2018-01-15 PROCEDURE — 83605 ASSAY OF LACTIC ACID: CPT

## 2018-01-15 PROCEDURE — 96361 HYDRATE IV INFUSION ADD-ON: CPT

## 2018-01-15 RX ORDER — DIPHENHYDRAMINE HCL 25 MG
25 CAPSULE ORAL EVERY 6 HOURS PRN
Qty: 30 CAPSULE | Refills: 1 | Status: SHIPPED | OUTPATIENT
Start: 2018-01-15 | End: 2018-01-25

## 2018-01-15 RX ORDER — ONDANSETRON 4 MG/1
4 TABLET, ORALLY DISINTEGRATING ORAL EVERY 8 HOURS PRN
Qty: 20 TABLET | Refills: 0 | Status: SHIPPED | OUTPATIENT
Start: 2018-01-15 | End: 2018-03-06

## 2018-01-15 RX ORDER — PANTOPRAZOLE SODIUM 20 MG/1
40 TABLET, DELAYED RELEASE ORAL DAILY
Qty: 30 TABLET | Refills: 0 | Status: SHIPPED | OUTPATIENT
Start: 2018-01-15 | End: 2018-03-06 | Stop reason: ALTCHOICE

## 2018-01-15 RX ORDER — PANTOPRAZOLE SODIUM 40 MG/10ML
80 INJECTION, POWDER, LYOPHILIZED, FOR SOLUTION INTRAVENOUS
Status: DISPENSED | OUTPATIENT
Start: 2018-01-15 | End: 2018-01-15

## 2018-01-15 RX ORDER — 0.9 % SODIUM CHLORIDE 0.9 %
10 VIAL (ML) INJECTION
Status: COMPLETED | OUTPATIENT
Start: 2018-01-15 | End: 2018-01-15

## 2018-01-15 RX ORDER — PANTOPRAZOLE SODIUM 40 MG/10ML
40 INJECTION, POWDER, LYOPHILIZED, FOR SOLUTION INTRAVENOUS
Status: COMPLETED | OUTPATIENT
Start: 2018-01-15 | End: 2018-01-15

## 2018-01-15 RX ORDER — 0.9 % SODIUM CHLORIDE 0.9 %
10 VIAL (ML) INJECTION
Status: ACTIVE | OUTPATIENT
Start: 2018-01-15 | End: 2018-01-15

## 2018-01-15 RX ORDER — DIPHENHYDRAMINE HYDROCHLORIDE 50 MG/ML
25 INJECTION INTRAMUSCULAR; INTRAVENOUS ONCE
Status: COMPLETED | OUTPATIENT
Start: 2018-01-15 | End: 2018-01-15

## 2018-01-15 RX ORDER — DIPHENHYDRAMINE HCL 25 MG
25 CAPSULE ORAL EVERY 4 HOURS PRN
Qty: 1 CAPSULE | Refills: 0 | Status: SHIPPED | OUTPATIENT
Start: 2018-01-15 | End: 2018-01-25

## 2018-01-15 RX ORDER — 0.9 % SODIUM CHLORIDE 0.9 %
1000 INTRAVENOUS SOLUTION INTRAVENOUS ONCE
Status: COMPLETED | OUTPATIENT
Start: 2018-01-15 | End: 2018-01-15

## 2018-01-15 RX ORDER — ONDANSETRON 2 MG/ML
4 INJECTION INTRAMUSCULAR; INTRAVENOUS ONCE
Status: COMPLETED | OUTPATIENT
Start: 2018-01-15 | End: 2018-01-15

## 2018-01-15 RX ORDER — TIZANIDINE 4 MG/1
4 TABLET ORAL EVERY 6 HOURS PRN
Status: ON HOLD | COMMUNITY
End: 2018-04-10 | Stop reason: ALTCHOICE

## 2018-01-15 RX ADMIN — SODIUM CHLORIDE 1000 ML: 9 INJECTION, SOLUTION INTRAVENOUS at 15:08

## 2018-01-15 RX ADMIN — ONDANSETRON 4 MG: 2 INJECTION INTRAMUSCULAR; INTRAVENOUS at 13:42

## 2018-01-15 RX ADMIN — PANTOPRAZOLE SODIUM 40 MG: 40 INJECTION, POWDER, FOR SOLUTION INTRAVENOUS at 13:53

## 2018-01-15 RX ADMIN — Medication 10 ML: at 13:51

## 2018-01-15 RX ADMIN — DIPHENHYDRAMINE HYDROCHLORIDE 25 MG: 50 INJECTION, SOLUTION INTRAMUSCULAR; INTRAVENOUS at 15:07

## 2018-01-15 RX ADMIN — SODIUM CHLORIDE 1000 ML: 9 INJECTION, SOLUTION INTRAVENOUS at 13:37

## 2018-01-15 RX ADMIN — PANTOPRAZOLE SODIUM 40 MG: 40 INJECTION, POWDER, FOR SOLUTION INTRAVENOUS at 13:51

## 2018-01-15 RX ADMIN — Medication 10 ML: at 13:54

## 2018-01-15 ASSESSMENT — PAIN DESCRIPTION - PAIN TYPE: TYPE: ACUTE PAIN

## 2018-01-15 ASSESSMENT — ENCOUNTER SYMPTOMS
SINUS PRESSURE: 0
SINUS PAIN: 0
PHOTOPHOBIA: 0
SHORTNESS OF BREATH: 0
CHEST TIGHTNESS: 0
COUGH: 0
ABDOMINAL PAIN: 1
ABDOMINAL DISTENTION: 1
VOMITING: 1
NAUSEA: 1
DIARRHEA: 1

## 2018-01-15 ASSESSMENT — PAIN DESCRIPTION - FREQUENCY: FREQUENCY: INTERMITTENT

## 2018-01-15 ASSESSMENT — PAIN SCALES - GENERAL: PAINLEVEL_OUTOF10: 7

## 2018-01-15 ASSESSMENT — PAIN DESCRIPTION - DESCRIPTORS: DESCRIPTORS: SHARP;STABBING

## 2018-01-15 ASSESSMENT — PAIN DESCRIPTION - ORIENTATION: ORIENTATION: MID;UPPER

## 2018-01-15 NOTE — ED PROVIDER NOTES
[address] [place] [address] ED  eMERGENCY dEPARTMENT eNCOUnter      Pt Name: aDvid Hager  MRN: [id number]  [place] 1990  Date of evaluation: 1/15/2018  Provider: Liberty Campos MD    [address] [place] [address]       Chief Complaint   Patient presents with    Abdominal Pain     w/ vomiting past 3 days         HISTORY OF PRESENT ILLNESS  (Location/Symptom, Timing/Onset, Context/Setting, Quality, Duration, Modifying Factors, Severity.)   David Hager is a 32 y.o. male who presents to the emergency department Presents with nausea vomiting for the past 3 days. States he hasn't been able to keep anything down for the past 3 days. States he has a history of Clostridium difficile ulcers with H. pylori in the past.  He states that he may have had some blood in his emesis 10 days ago but not recently. He is complaining of diarrhea but has not had a bowel movement today. Patient has had multiple workups and 5 CAT scans of the abdomen and pelvis in the year 2017 within the [facility] system. Nursing Notes were reviewed. ALLERGIES     Geodon [ziprasidone hcl];  Haldol [haloperidol]; and Iv dye [iodides]    CURRENT MEDICATIONS       Previous Medications    ACETAMINOPHEN (TYLENOL) 500 MG TABLET    Take 2 tablets by mouth every 6 hours as needed for Pain    ACETAMINOPHEN-CODEINE (TYLENOL/CODEINE #3) 300-30 MG PER TABLET    Take 1 tablet by mouth 3 times daily as needed for Pain    ALBUTEROL SULFATE HFA (VENTOLIN HFA) 108 (90 BASE) MCG/ACT INHALER    Inhale 2 puffs into the lungs every 6 hours as needed for Wheezing    DOCUSATE SODIUM (COLACE) 100 MG CAPSULE    Take 1 capsule by mouth 2 times daily    FLUTICASONE FUROATE-VILANTEROL (BREO ELLIPTA) 200-25 MCG/INH AEPB    Inhale 1 puff into the lungs daily    GABAPENTIN (NEURONTIN) 400 MG CAPSULE    Take 400 mg by mouth 2 times daily    IBUPROFEN (ADVIL;MOTRIN) 800 MG TABLET    Take 1 tablet by mouth every 8 hours as needed for Pain    MELOXICAM (MOBIC PO)    Take 15 mg by mouth daily    METHADONE (DOLOPHINE) 10 MG TABLET    Take 170 mg by mouth daily Dose verified [facility] [address] [address] [facility] [address] [facility] / pt states \"I completed it 3 months ago\"(stated 1/15/18). MONTELUKAST (SINGULAIR) 10 MG TABLET    Take 1 tablet by mouth nightly    MUPIROCIN (BACTROBAN) 2 % CREAM    Apply topically 3 times daily Apply topically 3 times daily to Left leg wound    SKIN PROTECTANTS, MISC. (EUCERIN) CREAM    Apply topically as needed.     SUCRALFATE (CARAFATE) 1 GM/10ML SUSPENSION    Take 10 mLs by mouth 4 times daily    TIZANIDINE (ZANAFLEX) 4 MG TABLET    Take 4 mg by mouth every 6 hours as needed    VITAMIN D (ERGOCALCIFEROL) 37823 UNITS CAPS CAPSULE    Take 1 capsule by mouth once a week       PAST MEDICAL HISTORY         Diagnosis Date    Anxiety     Arthritis     Bipolar I disorder, most recent episode (or current) depressed, unspecified 9/12/2014    Clostridium difficile infection     COPD (chronic obstructive pulmonary disease) (HCC)     Depression     Eczema     Fracture, metacarpal     R 4th and 5th    Gastric ulcer     GERD (gastroesophageal reflux disease)     GI bleed     H/O blood clots     Head injury     Headache     Insomnia     Juvenile rheumatoid arthritis (HCC)     PFAPA syndrome (HCC)     PUD (peptic ulcer disease)     Rheumatoid arthritis ([address]     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[address]     Substance abuse     Suicidal ideation     Suicide attempt by hanging ([address]     Tobacco dependence     Ulcerative colitis ([address]        SURGICAL HISTORY           Procedure Laterality Date    ABDOMEN SURGERY      upper GI scope 7/7/2015    BRONCHOSCOPY      CHOLECYSTECTOMY, LAPAROSCOPIC  07/14/2017    surgery performed at [address]      lumbar puncture    [place] COLONOSCOPY W/BIOPSY SINGLE/MULTIPLE  8/9/2017    COLONOSCOPY WITH BIOPSY performed by Rafa Francis MD at [facility]    [place] E[place] TRANSORAL BIOPSY SINGLE/MULTIPLE N/A 3/20/2017    EGD BIOPSY performed by Valente De Souza MD at [facility]    [place] ESOPHAGOGASTRODUODENOSCOPY TRANSORAL DIAGNOSTIC N/A 8/9/2017    EGD ESOPHAGOGASTRODUODENOSCOPY performed by Margoth Youssef MD at [address] N/A 3/20/2017    [address] performed by Valente De Souza MD at [address] [facility] ENDOSCOPY  2/4/16         FAMILY HISTORY           Problem Relation Age of Onset    Diabetes Father     Alcohol Abuse Father     Depression Father     Arthritis Father     High Blood Pressure Father     Other Father      aneurysm & epilepsy    Migraines Father     Arthritis Mother     Other Mother      aneurysm & epilepsy    Migraines Mother     Diabetes Brother      Aunt and uncles    Depression Brother     Mental Illness Brother     Other Brother      epilepsy    Migraines Brother     Stroke Other      Uncle    Other Brother      murdered Oct 6th, 2014    Colon Cancer Paternal Cousin 43    Other Sister      epilepsy   [facility] Migraines Sister      Family Status   Relation Status    Father Alive    Mother Alive    Brother     Other     Brother     Paternal Cousin     Sister         SOCIAL HISTORY      reports that he has been smoking E-Cigarettes and Cigarettes. He has a 2.50 pack-year smoking history. He has never used smokeless tobacco. He reports that he uses drugs. He reports that he does not drink alcohol. REVIEW OF SYSTEMS    (2-9 systems for level 4, 10 or more for level 5)     Review of Systems   Constitutional: Positive for activity change and appetite change. HENT: Negative for sinus pain and sinus pressure. Eyes: Negative for photophobia and visual disturbance. Respiratory: Negative for cough, chest tightness and shortness of breath. Cardiovascular: Negative for chest pain, palpitations and leg swelling.    Gastrointestinal: Positive for Plain radiographic images are visualized and preliminarily interpreted by the emergency physician with the below findings:      No results found. Interpretation per the Radiologist below, if available at the time of this note:    No orders to display         ED BEDSIDE ULTRASOUND:   Performed by ED Physician - none    LABS:  Labs Reviewed   HEPATIC FUNCTION PANEL - Abnormal; Notable for the following:        Result Value    Total Protein 8.5 (*)     All other components within normal limits   BASIC METABOLIC PANEL - Abnormal; Notable for the following:     Glucose 111 (*)     All other components within normal limits   CBC WITH AUTO DIFFERENTIAL - Abnormal; Notable for the following:     Monocytes 8 (*)     All other components within normal limits   CULTURE STOOL   C DIFF TOXIN B BY RT PCR   LIPASE   MAGNESIUM   LACTIC ACID   URINALYSIS   BLOOD OCCULT STOOL SCREEN #1       All other labs were within normal range or not returned as of this dictation. EMERGENCY DEPARTMENT COURSE and DIFFERENTIAL DIAGNOSIS/MDM:   Vitals:    Vitals:    01/15/18 1244   BP: (!) 170/80   Pulse: 93   Resp: 16   Temp: 97.9 °F (36.6 °C)   TempSrc: Oral   SpO2: 96%   Weight: 240 lb (108.9 kg)   Height: 5' 8\" (1.727 m)     Patient is given 2 L of hydration still is unable to produce a stool specimen is a discharged to follow-up care placed back on Protonix and Zofran. CONSULTS:  None    PROCEDURES:  Not    FINAL IMPRESSION      1. Non-intractable vomiting with nausea, unspecified vomiting type    2. Diarrhea, unspecified type    3.  Pain of upper abdomen          DISPOSITION/PLAN   DISPOSITION Decision To Discharge 01/15/2018 03:37:42 PM      PATIENT REFERRED TO:   [address]            DISCHARGE MEDICATIONS:     New Prescriptions    ONDANSETRON (ZOFRAN ODT) 4 MG DISINTEGRATING TABLET    Take 1 tablet by mouth every 8 hours as needed for Nausea    PANTOPRAZOLE (PROTONIX) 20 MG TABLET    Take 2 tablets by mouth daily           (Please

## 2018-01-29 RX ORDER — PROMETHAZINE HYDROCHLORIDE 25 MG/1
TABLET ORAL
Qty: 20 TABLET | Refills: 0 | Status: ON HOLD | OUTPATIENT
Start: 2018-01-29 | End: 2018-04-10 | Stop reason: ALTCHOICE

## 2018-01-30 ENCOUNTER — HOSPITAL ENCOUNTER (EMERGENCY)
Age: 28
Discharge: HOME | End: 2018-01-30
Attending: EMERGENCY MEDICINE
Payer: COMMERCIAL

## 2018-01-30 ENCOUNTER — APPOINTMENT (OUTPATIENT)
Dept: CT IMAGING | Age: 28
End: 2018-01-30
Payer: COMMERCIAL

## 2018-01-30 VITALS
SYSTOLIC BLOOD PRESSURE: 154 MMHG | HEIGHT: 72 IN | HEART RATE: 84 BPM | OXYGEN SATURATION: 97 % | WEIGHT: 250 LBS | RESPIRATION RATE: 18 BRPM | DIASTOLIC BLOOD PRESSURE: 84 MMHG | BODY MASS INDEX: 33.86 KG/M2 | TEMPERATURE: 97.3 F

## 2018-01-30 DIAGNOSIS — R51.9 NONINTRACTABLE HEADACHE, UNSPECIFIED CHRONICITY PATTERN, UNSPECIFIED HEADACHE TYPE: Primary | ICD-10-CM

## 2018-01-30 PROCEDURE — G0383 LEV 4 HOSP TYPE B ED VISIT: HCPCS

## 2018-01-30 PROCEDURE — 70450 CT HEAD/BRAIN W/O DYE: CPT

## 2018-01-30 PROCEDURE — 6360000002 HC RX W HCPCS: Performed by: EMERGENCY MEDICINE

## 2018-01-30 PROCEDURE — 6370000000 HC RX 637 (ALT 250 FOR IP): Performed by: EMERGENCY MEDICINE

## 2018-01-30 PROCEDURE — 96372 THER/PROPH/DIAG INJ SC/IM: CPT

## 2018-01-30 RX ORDER — ONDANSETRON 2 MG/ML
4 INJECTION INTRAMUSCULAR; INTRAVENOUS ONCE
Status: COMPLETED | OUTPATIENT
Start: 2018-01-30 | End: 2018-01-30

## 2018-01-30 RX ORDER — PROMETHAZINE HYDROCHLORIDE 25 MG/ML
12.5 INJECTION, SOLUTION INTRAMUSCULAR; INTRAVENOUS ONCE
Status: COMPLETED | OUTPATIENT
Start: 2018-01-30 | End: 2018-01-30

## 2018-01-30 RX ORDER — DIPHENHYDRAMINE HYDROCHLORIDE 50 MG/ML
50 INJECTION INTRAMUSCULAR; INTRAVENOUS ONCE
Status: COMPLETED | OUTPATIENT
Start: 2018-01-30 | End: 2018-01-30

## 2018-01-30 RX ORDER — KETOROLAC TROMETHAMINE 30 MG/ML
30 INJECTION, SOLUTION INTRAMUSCULAR; INTRAVENOUS ONCE
Status: COMPLETED | OUTPATIENT
Start: 2018-01-30 | End: 2018-01-30

## 2018-01-30 RX ORDER — DIPHENHYDRAMINE HCL 25 MG
25 TABLET ORAL ONCE
Status: COMPLETED | OUTPATIENT
Start: 2018-01-30 | End: 2018-01-30

## 2018-01-30 RX ADMIN — KETOROLAC TROMETHAMINE 30 MG: 30 INJECTION, SOLUTION INTRAMUSCULAR at 15:31

## 2018-01-30 RX ADMIN — DIPHENHYDRAMINE HYDROCHLORIDE 50 MG: 50 INJECTION, SOLUTION INTRAMUSCULAR; INTRAVENOUS at 15:31

## 2018-01-30 RX ADMIN — DIPHENHYDRAMINE HCL 25 MG: 25 TABLET ORAL at 13:33

## 2018-01-30 RX ADMIN — PROMETHAZINE HYDROCHLORIDE 12.5 MG: 25 INJECTION INTRAMUSCULAR; INTRAVENOUS at 13:34

## 2018-01-30 RX ADMIN — ONDANSETRON 4 MG: 2 INJECTION, SOLUTION INTRAMUSCULAR; INTRAVENOUS at 15:31

## 2018-01-30 ASSESSMENT — ENCOUNTER SYMPTOMS
VOMITING: 0
SHORTNESS OF BREATH: 0
ABDOMINAL PAIN: 0
COUGH: 0
PHOTOPHOBIA: 1
RHINORRHEA: 0
SORE THROAT: 0
NAUSEA: 1
DIARRHEA: 0

## 2018-01-30 ASSESSMENT — PAIN SCALES - GENERAL: PAINLEVEL_OUTOF10: 10

## 2018-01-30 NOTE — ED PROVIDER NOTES
[facility]     Emergency Department     Faculty Attestation    I performed a history and physical examination of the patient and discussed management with the resident. I have reviewed and agree with the residents findings including all diagnostic interpretations, and treatment plans as written. Any areas of disagreement are noted on the chart. I was personally present for the key portions of any procedures. I have documented in the chart those procedures where I was not present during the key portions. I have reviewed the emergency nurses triage note. I agree with the chief complaint, past medical history, past surgical history, allergies, medications, social and family history as documented unless otherwise noted below. Documentation of the HPI, Physical Exam and Medical Decision Making performed by sethibmargo is based on my personal performance of the HPI, PE and MDM. For Physician Assistant/ Nurse Practitioner cases/documentation I have personally evaluated this patient and have completed at least one if not all key elements of the E/M (history, physical exam, and MDM). Additional findings are as noted. 80-year-old male complaining of a multitude of symptoms. Patient reports having a possible head injury yesterday without loss of consciousness. He does complain of dizziness. Patient reports he is tolerating liquids. Physical exam vital signs are stable. Patient's alert pupils equal, rectal light extraocular movements are intact neck is supple no cervical tenderness, crepitus or deformity. Abdomen soft no guarding rebound or rigidity, right calf. No venous cords. Compartments are soft  Ct stable vss,     Pre-hypertension/Hypertension: The patient has been informed that they may have pre-hypertension or Hypertension based on a blood pressure reading in the emergency department.  I recommend that the patient call the primary care provider listed

## 2018-01-30 NOTE — ED NOTES
Pt continually getting out bed and walking around and then stating he is dizzy. Pt has been told multiple times by staff that he needs to stay in his bed. Pt given blankets. Awaiting ct.       Thea Zaragoza RN  01/30/18 2127

## 2018-01-30 NOTE — ED PROVIDER NOTES
[facility] ED  Emergency Department Encounter  Emergency Medicine Resident     Pt Name: Cristina Dempsey  MRN: [id number]  [place] 1990  Date of evaluation: 1/30/18  PCP:  No primary care provider on file. CHIEF COMPLAINT       Chief Complaint   Patient presents with    Headache    Dizziness       HISTORY OF PRESENT ILLNESS  (Location/Symptom, Timing/Onset, Context/Setting, Quality, Duration, Modifying Factors, Severity.)      Cristina Dempsey is a 32 y.o. male who presents with a chief complaint of right-sided sharp, throbbing headache that has gradually gotten worse over the past 4-5 days. Pain varies in intensity, but has been constant since onset. Patient reports this does not feel like his typical migraine pain. Patient has also had some associated dizziness with this headache. Patient notes that he fell, and hit his forehead last night. Patient also notes he had an episode of epistaxis last night, which has resolved. PAST MEDICAL / SURGICAL / SOCIAL / FAMILY HISTORY      has a past medical history of Anxiety; Arthritis; Bipolar I disorder, most recent episode (or current) depressed, unspecified; Clostridium difficile infection; COPD (chronic obstructive pulmonary disease) ([address] Depression; Eczema; Fracture, metacarpal; Gastric ulcer; GERD (gastroesophageal reflux disease); GI bleed; H/O blood clots; Head injury; Headache; Insomnia; Juvenile rheumatoid arthritis ([address] PFAPA syndrome ([address] PUD (peptic ulcer disease); Rheumatoid arthritis ([address] Rheumatoid arthritis(714.0); Sleep apnea; 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[address] Substance abuse; Suicidal ideation; Suicide attempt by hanging ([address] Tobacco dependence; and Ulcerative colitis ([address] has a past surgical history that includes Colonoscopy; bronchoscopy; other surgical history; Abdomen surgery;  Upper gastrointestinal endoscopy (2/4/16); egd transoral biopsy single/multiple (N/A, 3/20/2017); sigmoidoscopy (N/A, 3/20/2017);

## 2018-02-25 ENCOUNTER — HOSPITAL ENCOUNTER (EMERGENCY)
Age: 28
Discharge: HOME | End: 2018-02-25
Payer: COMMERCIAL

## 2018-02-25 VITALS
OXYGEN SATURATION: 100 % | HEIGHT: 70 IN | BODY MASS INDEX: 34.5 KG/M2 | DIASTOLIC BLOOD PRESSURE: 87 MMHG | TEMPERATURE: 98.2 F | HEART RATE: 86 BPM | WEIGHT: 241 LBS | SYSTOLIC BLOOD PRESSURE: 160 MMHG

## 2018-02-25 DIAGNOSIS — R51.9 NONINTRACTABLE EPISODIC HEADACHE, UNSPECIFIED HEADACHE TYPE: ICD-10-CM

## 2018-02-25 DIAGNOSIS — L25.3 CONTACT DERMATITIS DUE TO CHEMICALS: Primary | ICD-10-CM

## 2018-02-25 PROCEDURE — 96372 THER/PROPH/DIAG INJ SC/IM: CPT

## 2018-02-25 PROCEDURE — 99283 EMERGENCY DEPT VISIT LOW MDM: CPT

## 2018-02-25 PROCEDURE — 6360000002 HC RX W HCPCS: Performed by: NURSE PRACTITIONER

## 2018-02-25 RX ORDER — ONDANSETRON 4 MG/1
4 TABLET, ORALLY DISINTEGRATING ORAL ONCE
Status: COMPLETED | OUTPATIENT
Start: 2018-02-25 | End: 2018-02-25

## 2018-02-25 RX ORDER — KETOROLAC TROMETHAMINE 30 MG/ML
60 INJECTION, SOLUTION INTRAMUSCULAR; INTRAVENOUS ONCE
Status: COMPLETED | OUTPATIENT
Start: 2018-02-25 | End: 2018-02-25

## 2018-02-25 RX ORDER — DIPHENHYDRAMINE HYDROCHLORIDE 50 MG/ML
25 INJECTION INTRAMUSCULAR; INTRAVENOUS ONCE
Status: COMPLETED | OUTPATIENT
Start: 2018-02-25 | End: 2018-02-25

## 2018-02-25 RX ORDER — DIPHENHYDRAMINE HCL 25 MG
25 CAPSULE ORAL EVERY 6 HOURS PRN
Qty: 15 CAPSULE | Refills: 0 | Status: SHIPPED | OUTPATIENT
Start: 2018-02-25 | End: 2018-03-06

## 2018-02-25 RX ORDER — ACETAMINOPHEN 500 MG
1000 TABLET ORAL EVERY 6 HOURS PRN
Qty: 20 TABLET | Refills: 0 | Status: SHIPPED | OUTPATIENT
Start: 2018-02-25 | End: 2018-03-21

## 2018-02-25 RX ORDER — DEXAMETHASONE SODIUM PHOSPHATE 10 MG/ML
10 INJECTION INTRAMUSCULAR; INTRAVENOUS ONCE
Status: COMPLETED | OUTPATIENT
Start: 2018-02-25 | End: 2018-02-25

## 2018-02-25 RX ADMIN — KETOROLAC TROMETHAMINE 60 MG: 30 INJECTION, SOLUTION INTRAMUSCULAR at 10:44

## 2018-02-25 RX ADMIN — ONDANSETRON 4 MG: 4 TABLET, ORALLY DISINTEGRATING ORAL at 10:59

## 2018-02-25 RX ADMIN — DIPHENHYDRAMINE HYDROCHLORIDE 25 MG: 50 INJECTION, SOLUTION INTRAMUSCULAR; INTRAVENOUS at 10:47

## 2018-02-25 RX ADMIN — DEXAMETHASONE SODIUM PHOSPHATE 10 MG: 10 INJECTION INTRAMUSCULAR; INTRAVENOUS at 10:46

## 2018-02-25 ASSESSMENT — PAIN DESCRIPTION - DESCRIPTORS: DESCRIPTORS: ACHING;THROBBING

## 2018-02-25 ASSESSMENT — PAIN DESCRIPTION - PAIN TYPE: TYPE: ACUTE PAIN

## 2018-02-25 ASSESSMENT — PAIN DESCRIPTION - ONSET: ONSET: ON-GOING

## 2018-02-25 ASSESSMENT — PAIN SCALES - GENERAL: PAINLEVEL_OUTOF10: 6

## 2018-02-25 ASSESSMENT — PAIN DESCRIPTION - FREQUENCY: FREQUENCY: CONTINUOUS

## 2018-02-25 ASSESSMENT — PAIN DESCRIPTION - ORIENTATION: ORIENTATION: RIGHT

## 2018-02-25 ASSESSMENT — PAIN DESCRIPTION - LOCATION: LOCATION: HEAD

## 2018-02-25 ASSESSMENT — PAIN DESCRIPTION - PROGRESSION: CLINICAL_PROGRESSION: GRADUALLY WORSENING
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
## 2018-03-06 ENCOUNTER — HOSPITAL ENCOUNTER (EMERGENCY)
Age: 28
Discharge: HOME | End: 2018-03-06
Attending: EMERGENCY MEDICINE
Payer: COMMERCIAL

## 2018-03-06 VITALS
SYSTOLIC BLOOD PRESSURE: 145 MMHG | RESPIRATION RATE: 17 BRPM | OXYGEN SATURATION: 100 % | DIASTOLIC BLOOD PRESSURE: 87 MMHG | HEART RATE: 88 BPM

## 2018-03-06 DIAGNOSIS — R10.13 ABDOMINAL PAIN, EPIGASTRIC: ICD-10-CM

## 2018-03-06 DIAGNOSIS — R11.2 NON-INTRACTABLE VOMITING WITH NAUSEA, UNSPECIFIED VOMITING TYPE: Primary | ICD-10-CM

## 2018-03-06 LAB
ABSOLUTE EOS #: 0.11 K/UL (ref 0–0.44)
ABSOLUTE IMMATURE GRANULOCYTE: <0.03 K/UL (ref 0–0.3)
ABSOLUTE LYMPH #: 2.73 K/UL (ref 1.1–3.7)
ABSOLUTE MONO #: 0.53 K/UL (ref 0.1–1.2)
ALBUMIN SERPL-MCNC: 4.2 G/DL (ref 3.5–5.2)
ALBUMIN/GLOBULIN RATIO: 1.2 (ref 1–2.5)
ALP BLD-CCNC: 66 U/L (ref 40–129)
ALT SERPL-CCNC: 34 U/L (ref 5–41)
ANION GAP SERPL CALCULATED.3IONS-SCNC: 9 MMOL/L (ref 9–17)
AST SERPL-CCNC: 36 U/L
BASOPHILS # BLD: 0 % (ref 0–2)
BASOPHILS ABSOLUTE: <0.03 K/UL (ref 0–0.2)
BILIRUB SERPL-MCNC: 0.37 MG/DL (ref 0.3–1.2)
BUN BLDV-MCNC: 12 MG/DL (ref 6–20)
BUN/CREAT BLD: ABNORMAL (ref 9–20)
CALCIUM SERPL-MCNC: 9.2 MG/DL (ref 8.6–10.4)
CHLORIDE BLD-SCNC: 95 MMOL/L (ref 98–107)
CO2: 31 MMOL/L (ref 20–31)
CREAT SERPL-MCNC: 0.83 MG/DL (ref 0.7–1.2)
DIFFERENTIAL TYPE: NORMAL
EOSINOPHILS RELATIVE PERCENT: 1 % (ref 1–4)
GFR AFRICAN AMERICAN: >60 ML/MIN
GFR NON-AFRICAN AMERICAN: >60 ML/MIN
GFR SERPL CREATININE-BSD FRML MDRD: ABNORMAL ML/MIN/{1.73_M2}
GFR SERPL CREATININE-BSD FRML MDRD: ABNORMAL ML/MIN/{1.73_M2}
GLUCOSE BLD-MCNC: 109 MG/DL (ref 70–99)
HCT VFR BLD CALC: 41 % (ref 40.7–50.3)
HEMOGLOBIN: 13.2 G/DL (ref 13–17)
IMMATURE GRANULOCYTES: 0 %
LIPASE: 30 U/L (ref 13–60)
LYMPHOCYTES # BLD: 35 % (ref 24–43)
MCH RBC QN AUTO: 29.1 PG (ref 25.2–33.5)
MCHC RBC AUTO-ENTMCNC: 32.2 G/DL (ref 28.4–34.8)
MCV RBC AUTO: 90.3 FL (ref 82.6–102.9)
MONOCYTES # BLD: 7 % (ref 3–12)
NRBC AUTOMATED: 0 PER 100 WBC
PDW BLD-RTO: 12.4 % (ref 11.8–14.4)
PLATELET # BLD: 296 K/UL (ref 138–453)
PLATELET ESTIMATE: NORMAL
PMV BLD AUTO: 9.9 FL (ref 8.1–13.5)
POTASSIUM SERPL-SCNC: 3.8 MMOL/L (ref 3.7–5.3)
RBC # BLD: 4.54 M/UL (ref 4.21–5.77)
RBC # BLD: NORMAL 10*6/UL
SEG NEUTROPHILS: 57 % (ref 36–65)
SEGMENTED NEUTROPHILS ABSOLUTE COUNT: 4.34 K/UL (ref 1.5–8.1)
SODIUM BLD-SCNC: 135 MMOL/L (ref 135–144)
TOTAL PROTEIN: 7.8 G/DL (ref 6.4–8.3)
WBC # BLD: 7.7 K/UL (ref 3.5–11.3)
WBC # BLD: NORMAL 10*3/UL

## 2018-03-06 PROCEDURE — 2580000003 HC RX 258: Performed by: EMERGENCY MEDICINE

## 2018-03-06 PROCEDURE — 2500000003 HC RX 250 WO HCPCS: Performed by: EMERGENCY MEDICINE

## 2018-03-06 PROCEDURE — 96374 THER/PROPH/DIAG INJ IV PUSH: CPT

## 2018-03-06 PROCEDURE — 6360000002 HC RX W HCPCS: Performed by: EMERGENCY MEDICINE

## 2018-03-06 PROCEDURE — 99284 EMERGENCY DEPT VISIT MOD MDM: CPT

## 2018-03-06 PROCEDURE — 85025 COMPLETE CBC W/AUTO DIFF WBC: CPT

## 2018-03-06 PROCEDURE — 83690 ASSAY OF LIPASE: CPT

## 2018-03-06 PROCEDURE — 80053 COMPREHEN METABOLIC PANEL: CPT

## 2018-03-06 PROCEDURE — 6370000000 HC RX 637 (ALT 250 FOR IP): Performed by: EMERGENCY MEDICINE

## 2018-03-06 PROCEDURE — 96375 TX/PRO/DX INJ NEW DRUG ADDON: CPT

## 2018-03-06 RX ORDER — ALUMINA, MAGNESIA, AND SIMETHICONE 2400; 2400; 240 MG/30ML; MG/30ML; MG/30ML
15 SUSPENSION ORAL EVERY 6 HOURS PRN
Qty: 355 ML | Refills: 0 | Status: SHIPPED | OUTPATIENT
Start: 2018-03-06 | End: 2018-03-21

## 2018-03-06 RX ORDER — 0.9 % SODIUM CHLORIDE 0.9 %
10 VIAL (ML) INJECTION ONCE
Status: COMPLETED | OUTPATIENT
Start: 2018-03-06 | End: 2018-03-06

## 2018-03-06 RX ORDER — ESOMEPRAZOLE SODIUM 40 MG/5ML
40 INJECTION INTRAVENOUS ONCE
Status: COMPLETED | OUTPATIENT
Start: 2018-03-06 | End: 2018-03-06

## 2018-03-06 RX ORDER — ESOMEPRAZOLE MAGNESIUM 40 MG/1
40 CAPSULE, DELAYED RELEASE ORAL
Qty: 30 CAPSULE | Refills: 0 | Status: SHIPPED | OUTPATIENT
Start: 2018-03-06 | End: 2018-03-21 | Stop reason: ALTCHOICE

## 2018-03-06 RX ORDER — DIPHENHYDRAMINE HCL 25 MG
25 CAPSULE ORAL EVERY 6 HOURS PRN
Qty: 15 CAPSULE | Refills: 0 | Status: SHIPPED | OUTPATIENT
Start: 2018-03-06 | End: 2018-03-16

## 2018-03-06 RX ORDER — DIPHENHYDRAMINE HYDROCHLORIDE 50 MG/ML
25 INJECTION INTRAMUSCULAR; INTRAVENOUS ONCE
Status: COMPLETED | OUTPATIENT
Start: 2018-03-06 | End: 2018-03-06

## 2018-03-06 RX ORDER — ONDANSETRON 4 MG/1
4 TABLET, ORALLY DISINTEGRATING ORAL EVERY 8 HOURS PRN
Qty: 20 TABLET | Refills: 0 | Status: ON HOLD | OUTPATIENT
Start: 2018-03-06 | End: 2018-04-10 | Stop reason: ALTCHOICE

## 2018-03-06 RX ORDER — MAGNESIUM HYDROXIDE/ALUMINUM HYDROXICE/SIMETHICONE 120; 1200; 1200 MG/30ML; MG/30ML; MG/30ML
30 SUSPENSION ORAL
Status: COMPLETED | OUTPATIENT
Start: 2018-03-06 | End: 2018-03-06

## 2018-03-06 RX ORDER — ONDANSETRON 2 MG/ML
4 INJECTION INTRAMUSCULAR; INTRAVENOUS ONCE
Status: COMPLETED | OUTPATIENT
Start: 2018-03-06 | End: 2018-03-06

## 2018-03-06 RX ADMIN — DIPHENHYDRAMINE HYDROCHLORIDE 25 MG: 50 INJECTION, SOLUTION INTRAMUSCULAR; INTRAVENOUS at 02:04

## 2018-03-06 RX ADMIN — ALUMINUM HYDROXIDE, MAGNESIUM HYDROXIDE, AND SIMETHICONE 30 ML: 200; 200; 20 SUSPENSION ORAL at 02:07

## 2018-03-06 RX ADMIN — ESOMEPRAZOLE SODIUM 40 MG: 40 INJECTION INTRAVENOUS at 02:04

## 2018-03-06 RX ADMIN — Medication 10 ML: at 02:07

## 2018-03-06 RX ADMIN — ONDANSETRON 4 MG: 2 INJECTION INTRAMUSCULAR; INTRAVENOUS at 02:03

## 2018-03-06 ASSESSMENT — ENCOUNTER SYMPTOMS
SHORTNESS OF BREATH: 0
NAUSEA: 1
CONSTIPATION: 0
DIARRHEA: 0
CHEST TIGHTNESS: 0
ABDOMINAL PAIN: 1
SORE THROAT: 0
VOMITING: 1
BLOOD IN STOOL: 1

## 2018-03-06 ASSESSMENT — PAIN DESCRIPTION - ORIENTATION: ORIENTATION: MID

## 2018-03-06 ASSESSMENT — PAIN SCALES - GENERAL: PAINLEVEL_OUTOF10: 8

## 2018-03-06 ASSESSMENT — PAIN DESCRIPTION - LOCATION: LOCATION: ABDOMEN

## 2018-03-06 NOTE — ED PROVIDER NOTES
[facility] [address]  ED  Emergency Department Encounter  Emergency Medicine Resident     Pt Name: Estela Cruz  MRN: [id number]  [place] 1990  Date of evaluation: 3/6/18  PCP:  No primary care provider on file. CHIEF COMPLAINT       Chief Complaint   Patient presents with    Nausea    Emesis       HISTORY OF PRESENT ILLNESS  (Location/Symptom, Timing/Onset, Context/Setting, Quality, Duration, Modifying Factors, Severity.)      Estela Friend is a 32 y.o. male who presents with With chief complaint of epigastric abdominal pain for the past week. Patient states he has a history of H. pylori approximately 2 months ago and was prescribed antibiotics at that time. States he did not finish the course as he was feeling better. Patient states over the past few days he's been having difficulty eating and tolerating fluids secondary to abdominal pain and vomiting. Patient states his abdominal pain is a burning sensation that gets worse with eating and better with rest.  States he has been having emesis with 4 episodes today. Denies any hematemesis. Patient also states he has noticed some blood streaking with his stools. Denies any diarrhea. Denies any painful defecation. Denies any fevers or chills. Denies any change in urinary habits. Patient also complains of some burning sensation of his hands bilaterally and some pruritus of his face. Denies any chest pain, chest tightness or shortness of breath. Denies any difficulty tolerating his secretions. PAST MEDICAL / SURGICAL / SOCIAL / FAMILY HISTORY      has a past medical history of Anxiety; Arthritis; Bipolar I disorder, most recent episode (or current) depressed, unspecified; Clostridium difficile infection; COPD (chronic obstructive pulmonary disease) [address] [address] [facility] Depression; Eczema; Fracture, metacarpal; Gastric ulcer; GERD (gastroesophageal reflux disease); GI bleed; H. pylori infection; H/O blood clots; Head injury; Headache;  Insomnia; 2014    Colon Cancer Paternal Cousin 43    Other Sister      epilepsy    Migraines Sister        Allergies:  Geodon [ziprasidone hcl]; Haldol [haloperidol]; and Iv dye [iodides]    Home Medications:  Prior to Admission medications    Medication Sig Start Date End Date Taking? Authorizing Provider   ondansetron (ZOFRAN ODT) 4 MG disintegrating tablet Take 1 tablet by mouth every 8 hours as needed for Nausea 3/6/18  Yes Yamilka Jones MD   aluminum & magnesium hydroxide-simethicone (MAALOX MAX) 303-624-63 MG/5ML SUSP Take 15 mLs by mouth every 6 hours as needed (indigestion) 3/6/18  Yes Yamilka Jones MD   esomeprazole ([address]) 40 MG delayed release capsule Take 1 capsule by mouth every morning (before breakfast) 3/6/18  Yes Yamilka Jones MD   diphenhydrAMINE (BENADRYL) 25 MG capsule Take 1 capsule by mouth every 6 hours as needed for Itching 3/6/18 3/16/18 Yes Yamilka Jones MD   promethazine (PHENERGAN) 25 MG tablet TAKE 1 TABLET BY MOUTH EVERY TWELVE HOURS AS NEEDED 1/29/18  Yes Minda Edwards MD   tiZANidine (ZANAFLEX) 4 MG tablet Take 4 mg by mouth every 6 hours as needed   Yes Historical Provider, MD   mupirocin (BACTROBAN) 2 % cream Apply topically 3 times daily Apply topically 3 times daily to Left leg wound 11/9/17  Yes Nyasia Escobar MD   docusate sodium (COLACE) 100 MG capsule Take 1 capsule by mouth 2 times daily 9/16/17  Yes Sorin Leslie CNP   methadone (DOLOPHINE) 10 MG tablet Take 170 mg by mouth daily Dose verified with [address] on [facility] / pt states \"I completed it 3 months ago\"(stated 1/15/18). Yes Historical Provider, MD   Meloxicam (MOBIC PO) Take 15 mg by mouth daily   Yes Historical Provider, MD   sucralfate (CARAFATE) 1 GM/10ML suspension Take 10 mLs by mouth 4 times daily 5/31/17  Yes Nyasia Escobar MD   Skin Protectants, Misc. (EUCERIN) cream Apply topically as needed.  4/24/17  Yes Katia Alonzo, DO   gabapentin (NEURONTIN) 400 MG capsule Take 400 mg by mouth 2 times daily Yes Historical Provider, MD   vitamin D (ERGOCALCIFEROL) 04584 UNITS CAPS capsule Take 1 capsule by mouth once a week 1/5/17  Yes Mack Hudson MD   Fluticasone Furoate-Vilanterol (BREO ELLIPTA) 200-25 MCG/INH AEPB Inhale 1 puff into the lungs daily 12/16/16  Yes Ej Cruz MD   albuterol sulfate HFA (VENTOLIN HFA) 108 (90 BASE) MCG/ACT inhaler Inhale 2 puffs into the lungs every 6 hours as needed for Wheezing 12/13/16  Yes Ej Cruz MD   montelukast (SINGULAIR) 10 MG tablet Take 1 tablet by mouth nightly 12/13/16  Yes Ej Cruz MD   acetaminophen (TYLENOL) 500 MG tablet Take 2 tablets by mouth every 6 hours as needed for Pain 2/25/18   Ariana Mosley NP   acetaminophen (TYLENOL) 500 MG tablet Take 2 tablets by mouth every 6 hours as needed for Pain 9/18/17   Ric Gutierrez, DO   acetaminophen-codeine (TYLENOL/CODEINE #3) 300-30 MG per tablet Take 1 tablet by mouth 3 times daily as needed for Pain 5/12/17   Eloisa Cue, DO       REVIEW OF SYSTEMS    (2-9 systems for level 4, 10 or more for level 5)      Review of Systems   Constitutional: Negative for chills, diaphoresis and fever. HENT: Negative for congestion and sore throat. Respiratory: Negative for chest tightness and shortness of breath. Cardiovascular: Negative for chest pain. Gastrointestinal: Positive for abdominal pain, blood in stool, nausea and vomiting. Negative for constipation and diarrhea. Genitourinary: Negative for dysuria, frequency and urgency. Musculoskeletal: Negative for arthralgias and neck pain. Skin: Negative for rash and wound. Neurological: Negative for weakness and numbness. Psychiatric/Behavioral: Negative for agitation and confusion.        PHYSICAL EXAM   (up to 7 for level 4, 8 or more for level 5)      INITIAL VITALS:   BP (!) 145/87   Pulse 88   Resp 17   SpO2 100%     Physical Exam    CONSTITUTIONAL: AOx4, cooperative with exam, afebrile   HEAD: normocephalic, atraumatic   EYES: PERRL, EOMI, anicteric sclera   ENT: Moist mucous membranes, uvula midline   NECK: Supple, symmetric, trachea midline   BACK: Symmetric, no deformity   LUNGS: Bilateral breath sounds, CTAB, no rales/ronchi/wheezes   CARDIOVASCULAR: RRR, no m/r/g, 2+ pulses throughout   ABDOMEN: Soft, epigastric tenderness to palpation, no rebound tenderness or gaurding, non-distended, +BS   NEUROLOGIC:  MAEx4, normal sensorium and gait; normal strength throughout   MUSCULOSKELETAL: No clubbing, cyanosis or edema; no calf swelling or tenderness.  Negative Homans sign; no palpable cord   SKIN: No rash, pallor or wounds        DIFFERENTIAL  DIAGNOSIS     PLAN (LABS / IMAGING / EKG):  Orders Placed This Encounter   Procedures    CBC Auto Differential    Lipase    Comprehensive Metabolic Panel    Insert peripheral IV       MEDICATIONS ORDERED:  Orders Placed This Encounter   Medications    aluminum & magnesium hydroxide-simethicone (MAALOX) 200-200-20 MG/5ML suspension 30 mL    ondansetron (ZOFRAN) injection 4 mg    AND Linked Order Group     esomeprazole (NEXIUM) injection 40 mg     sodium chloride (PF) 0.9 % injection 10 mL    diphenhydrAMINE (BENADRYL) injection 25 mg    ondansetron (ZOFRAN ODT) 4 MG disintegrating tablet     Sig: Take 1 tablet by mouth every 8 hours as needed for Nausea     Dispense:  20 tablet     Refill:  0    aluminum & magnesium hydroxide-simethicone (MAALOX MAX) 400-400-40 MG/5ML SUSP     Sig: Take 15 mLs by mouth every 6 hours as needed (indigestion)     Dispense:  355 mL     Refill:  0    esomeprazole (NEXIUM) 40 MG delayed release capsule     Sig: Take 1 capsule by mouth every morning (before breakfast)     Dispense:  30 capsule     Refill:  0    diphenhydrAMINE (BENADRYL) 25 MG capsule     Sig: Take 1 capsule by mouth every 6 hours as needed for Itching     Dispense:  15 capsule     Refill:  0       DDX: DDX: GERD, PUD, pancreatitis, cholecystitis, GB colic, cholangitis, Kxfo-Olvf-Bbidax, ACS/ MI, pneumonia,  >60 >60 mL/min    GFR Comment          GFR Staging NOT REPORTED        IMPRESSION: 27-year-old male presenting with epigastric abdominal pain with nausea and vomiting. History of H. pylori and patient did not complete his antibiotic course. Likely has exacerbation of his peptic ulcers. No hematemesis. Mild epigastric tenderness on exam.  No peritoneal signs. Rectal exam guaiac negative. Plan is for CBC, CMP, lipase, anti-emetics, Maalox and reassess. RADIOLOGY:  No results found. EKG  None    All EKG's are interpreted by the Emergency Department Physician who either signs or Co-signs this chart in the absence of a cardiologist.    EMERGENCY DEPARTMENT COURSE:  ED Course as of Mar 06 0231   Abhay 103 Mar 06, 2018   0226 Patient reassessed. Patient states he is feeling better after the Maalox and Nexium. Patient also states his itching is better with Benadryl. Lungs clear to auscultation and reexamination. Abdomen slightly tender in the epigastric region without peritoneal signs. Discussed discharge with Nexium, Maalox and Benadryl. Also discussed follow-up with primary care physician. Patient agreeable. Return instructions provided. [DS]      ED Course User Index  [DS] Lida Murray MD         PROCEDURES:  None    CONSULTS:  None    CRITICAL CARE:  None    FINAL IMPRESSION      1. Non-intractable vomiting with nausea, unspecified vomiting type    2. Abdominal pain, epigastric          DISPOSITION / PLAN     DISPOSITION  Decision to discharge      PATIENT REFERRED TO:  PCP    Call in 1 day  For a follow up appointment.       DISCHARGE MEDICATIONS:  New Prescriptions    ALUMINUM & MAGNESIUM HYDROXIDE-SIMETHICONE (MAALOX MAX) 400-400-40 MG/5ML SUSP    Take 15 mLs by mouth every 6 hours as needed (indigestion)    ESOMEPRAZOLE (NEXIUM) 40 MG DELAYED RELEASE CAPSULE    Take 1 capsule by mouth every morning (before breakfast)       Lida Murray MD  Emergency Medicine Resident    (Please
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
## 2018-03-09 ENCOUNTER — TELEPHONE (OUTPATIENT)
Dept: GASTROENTEROLOGY | Age: 28
End: 2018-03-09

## 2018-03-10 ENCOUNTER — HOSPITAL ENCOUNTER (EMERGENCY)
Age: 28
Discharge: HOME | End: 2018-03-10
Attending: EMERGENCY MEDICINE
Payer: COMMERCIAL

## 2018-03-10 ENCOUNTER — APPOINTMENT (OUTPATIENT)
Dept: GENERAL RADIOLOGY | Age: 28
End: 2018-03-10
Payer: COMMERCIAL

## 2018-03-10 VITALS
TEMPERATURE: 99 F | RESPIRATION RATE: 15 BRPM | DIASTOLIC BLOOD PRESSURE: 124 MMHG | HEART RATE: 99 BPM | HEIGHT: 69 IN | OXYGEN SATURATION: 98 % | BODY MASS INDEX: 35.55 KG/M2 | WEIGHT: 240 LBS | SYSTOLIC BLOOD PRESSURE: 160 MMHG

## 2018-03-10 DIAGNOSIS — D64.9 ANEMIA, UNSPECIFIED TYPE: ICD-10-CM

## 2018-03-10 DIAGNOSIS — R74.01 ELEVATED AST (SGOT): ICD-10-CM

## 2018-03-10 DIAGNOSIS — R51.9 NONINTRACTABLE HEADACHE, UNSPECIFIED CHRONICITY PATTERN, UNSPECIFIED HEADACHE TYPE: ICD-10-CM

## 2018-03-10 DIAGNOSIS — R10.13 ABDOMINAL PAIN, EPIGASTRIC: ICD-10-CM

## 2018-03-10 DIAGNOSIS — L03.115 CELLULITIS OF RIGHT LOWER EXTREMITY: ICD-10-CM

## 2018-03-10 DIAGNOSIS — M79.89 LEG SWELLING: Primary | ICD-10-CM

## 2018-03-10 DIAGNOSIS — E87.6 HYPOKALEMIA: ICD-10-CM

## 2018-03-10 LAB
ABSOLUTE EOS #: 0.11 K/UL (ref 0–0.44)
ABSOLUTE IMMATURE GRANULOCYTE: 0.03 K/UL (ref 0–0.3)
ABSOLUTE LYMPH #: 2.56 K/UL (ref 1.1–3.7)
ABSOLUTE MONO #: 0.57 K/UL (ref 0.1–1.2)
ALBUMIN SERPL-MCNC: 3.9 G/DL (ref 3.5–5.2)
ALBUMIN/GLOBULIN RATIO: 1.2 (ref 1–2.5)
ALP BLD-CCNC: 66 U/L (ref 40–129)
ALT SERPL-CCNC: 36 U/L (ref 5–41)
ANION GAP SERPL CALCULATED.3IONS-SCNC: 11 MMOL/L (ref 9–17)
AST SERPL-CCNC: 45 U/L
BASOPHILS # BLD: 0 % (ref 0–2)
BASOPHILS ABSOLUTE: <0.03 K/UL (ref 0–0.2)
BILIRUB SERPL-MCNC: 0.32 MG/DL (ref 0.3–1.2)
BNP INTERPRETATION: NORMAL
BUN BLDV-MCNC: 5 MG/DL (ref 6–20)
BUN/CREAT BLD: ABNORMAL (ref 9–20)
CALCIUM SERPL-MCNC: 9 MG/DL (ref 8.6–10.4)
CHLORIDE BLD-SCNC: 98 MMOL/L (ref 98–107)
CO2: 30 MMOL/L (ref 20–31)
CREAT SERPL-MCNC: 0.83 MG/DL (ref 0.7–1.2)
DIFFERENTIAL TYPE: ABNORMAL
EKG ATRIAL RATE: 89 BPM
EKG P AXIS: 63 DEGREES
EKG P-R INTERVAL: 140 MS
EKG Q-T INTERVAL: 366 MS
EKG QRS DURATION: 74 MS
EKG QTC CALCULATION (BAZETT): 445 MS
EKG R AXIS: 47 DEGREES
EKG T AXIS: 22 DEGREES
EKG VENTRICULAR RATE: 89 BPM
EOSINOPHILS RELATIVE PERCENT: 1 % (ref 1–4)
GFR AFRICAN AMERICAN: >60 ML/MIN
GFR NON-AFRICAN AMERICAN: >60 ML/MIN
GFR SERPL CREATININE-BSD FRML MDRD: ABNORMAL ML/MIN/{1.73_M2}
GFR SERPL CREATININE-BSD FRML MDRD: ABNORMAL ML/MIN/{1.73_M2}
GLUCOSE BLD-MCNC: 104 MG/DL (ref 70–99)
HCT VFR BLD CALC: 37.8 % (ref 40.7–50.3)
HEMOGLOBIN: 12.2 G/DL (ref 13–17)
IMMATURE GRANULOCYTES: 0 %
LACTIC ACID, WHOLE BLOOD: 1.4 MMOL/L (ref 0.7–2.1)
LIPASE: 24 U/L (ref 13–60)
LYMPHOCYTES # BLD: 30 % (ref 24–43)
MCH RBC QN AUTO: 28.8 PG (ref 25.2–33.5)
MCHC RBC AUTO-ENTMCNC: 32.3 G/DL (ref 28.4–34.8)
MCV RBC AUTO: 89.4 FL (ref 82.6–102.9)
MONOCYTES # BLD: 7 % (ref 3–12)
MYOGLOBIN: 103 NG/ML (ref 28–72)
NRBC AUTOMATED: 0 PER 100 WBC
PDW BLD-RTO: 12.8 % (ref 11.8–14.4)
PLATELET # BLD: 272 K/UL (ref 138–453)
PLATELET ESTIMATE: ABNORMAL
PMV BLD AUTO: 10 FL (ref 8.1–13.5)
POTASSIUM SERPL-SCNC: 3.6 MMOL/L (ref 3.7–5.3)
PRO-BNP: 92 PG/ML
RBC # BLD: 4.23 M/UL (ref 4.21–5.77)
RBC # BLD: ABNORMAL 10*6/UL
SEG NEUTROPHILS: 62 % (ref 36–65)
SEGMENTED NEUTROPHILS ABSOLUTE COUNT: 5.16 K/UL (ref 1.5–8.1)
SODIUM BLD-SCNC: 139 MMOL/L (ref 135–144)
TOTAL CK: 472 U/L (ref 39–308)
TOTAL PROTEIN: 7.2 G/DL (ref 6.4–8.3)
WBC # BLD: 8.5 K/UL (ref 3.5–11.3)
WBC # BLD: ABNORMAL 10*3/UL

## 2018-03-10 PROCEDURE — 82550 ASSAY OF CK (CPK): CPT

## 2018-03-10 PROCEDURE — 83874 ASSAY OF MYOGLOBIN: CPT

## 2018-03-10 PROCEDURE — 83605 ASSAY OF LACTIC ACID: CPT

## 2018-03-10 PROCEDURE — 96375 TX/PRO/DX INJ NEW DRUG ADDON: CPT

## 2018-03-10 PROCEDURE — 96376 TX/PRO/DX INJ SAME DRUG ADON: CPT

## 2018-03-10 PROCEDURE — 93005 ELECTROCARDIOGRAM TRACING: CPT

## 2018-03-10 PROCEDURE — 85025 COMPLETE CBC W/AUTO DIFF WBC: CPT

## 2018-03-10 PROCEDURE — G0383 LEV 4 HOSP TYPE B ED VISIT: HCPCS

## 2018-03-10 PROCEDURE — 83690 ASSAY OF LIPASE: CPT

## 2018-03-10 PROCEDURE — 6360000002 HC RX W HCPCS: Performed by: EMERGENCY MEDICINE

## 2018-03-10 PROCEDURE — 80053 COMPREHEN METABOLIC PANEL: CPT

## 2018-03-10 PROCEDURE — 83880 ASSAY OF NATRIURETIC PEPTIDE: CPT

## 2018-03-10 PROCEDURE — 74022 RADEX COMPL AQT ABD SERIES: CPT

## 2018-03-10 PROCEDURE — 96374 THER/PROPH/DIAG INJ IV PUSH: CPT

## 2018-03-10 RX ORDER — 0.9 % SODIUM CHLORIDE 0.9 %
1000 INTRAVENOUS SOLUTION INTRAVENOUS ONCE
Status: DISCONTINUED | OUTPATIENT
Start: 2018-03-10 | End: 2018-03-10

## 2018-03-10 RX ORDER — ONDANSETRON 2 MG/ML
4 INJECTION INTRAMUSCULAR; INTRAVENOUS ONCE
Status: COMPLETED | OUTPATIENT
Start: 2018-03-10 | End: 2018-03-10

## 2018-03-10 RX ORDER — DIPHENHYDRAMINE HYDROCHLORIDE 50 MG/ML
25 INJECTION INTRAMUSCULAR; INTRAVENOUS ONCE
Status: COMPLETED | OUTPATIENT
Start: 2018-03-10 | End: 2018-03-10

## 2018-03-10 RX ORDER — DOXYCYCLINE 100 MG/1
100 TABLET ORAL 2 TIMES DAILY
Qty: 28 TABLET | Refills: 0 | Status: SHIPPED | OUTPATIENT
Start: 2018-03-10 | End: 2018-03-21 | Stop reason: ALTCHOICE

## 2018-03-10 RX ADMIN — DIPHENHYDRAMINE HYDROCHLORIDE 25 MG: 50 INJECTION, SOLUTION INTRAMUSCULAR; INTRAVENOUS at 19:21

## 2018-03-10 RX ADMIN — ONDANSETRON 4 MG: 2 INJECTION, SOLUTION INTRAMUSCULAR; INTRAVENOUS at 19:21

## 2018-03-10 RX ADMIN — DIPHENHYDRAMINE HYDROCHLORIDE 25 MG: 50 INJECTION, SOLUTION INTRAMUSCULAR; INTRAVENOUS at 18:11

## 2018-03-10 RX ADMIN — PROCHLORPERAZINE EDISYLATE 10 MG: 5 INJECTION INTRAMUSCULAR; INTRAVENOUS at 18:11

## 2018-03-10 ASSESSMENT — ENCOUNTER SYMPTOMS
NAUSEA: 1
SHORTNESS OF BREATH: 0
ABDOMINAL PAIN: 1
VOMITING: 1
WHEEZING: 0
COLOR CHANGE: 1

## 2018-03-10 ASSESSMENT — PAIN DESCRIPTION - LOCATION: LOCATION: HEAD

## 2018-03-10 ASSESSMENT — PAIN DESCRIPTION - DESCRIPTORS: DESCRIPTORS: HEADACHE

## 2018-03-10 ASSESSMENT — PAIN SCALES - GENERAL: PAINLEVEL_OUTOF10: 7

## 2018-03-10 NOTE — ED PROVIDER NOTES
[address] [facility]  ED  Emergency Department Encounter  Emergency Medicine Resident     Pt Name: Arthur Mack  MRN: [id number]  [place] 1990  Date of evaluation: 3/10/18  PCP:  No primary care provider on file. CHIEF COMPLAINT       Chief Complaint   Patient presents with    Leg Swelling     bilateral leg, swelling with discoloration to lower extremities.  Headache     head and feeling dizzy for several hours. pt stated he has been having emesi throughout day        HISTORY OF PRESENT ILLNESS  (Location/Symptom, Timing/Onset, Context/Setting, Quality, Duration, Modifying Factors, Severity.)      Arthur Mack is a 32 y.o. male who presents With a wide array of complaints. He starts by discussing the discoloration and itching and burning sensation to his hands and then proceeds to discuss the lower extremity edema has been worsening over several days. He discusses nausea, vomiting and abdominal pain in the epigastric region that is chronic but states that he is scheduled for a scope with GI as he has a history of GI bleed. He reports he has been having intermittent headaches for the last several days that are associated with some lightheadedness and nausea and resolved without any intervention. Further Located past medical history as below and reports history of rheumatoid arthritis. Although patient does not make me aware of this per chart review he was evaluated at [facility] for similar complaints including lower extremity edema and workup there was negative for acute process and he was discharged in stable condition. CT scan of the head negative for acute process in January. CT of the abdomen within the last month was negative for any acute findings. Had negative Dopplers of lower extremities yesterday. Please see FYMina in patient's chart as well. PAST MEDICAL / SURGICAL / SOCIAL / FAMILY HISTORY      has a past medical history of Anxiety;  Arthritis; Bipolar I Akash Medrano,    docusate sodium (COLACE) 100 MG capsule Take 1 capsule by mouth 2 times daily 9/16/17   Rosio Martel CNP   methadone (DOLOPHINE) 10 MG tablet Take 170 mg by mouth daily Dose verified with [facility] on [facility] / pt states \"I completed it 3 months ago\"(stated 1/15/18). Historical Provider, MD   Meloxicam (MOBIC PO) Take 15 mg by mouth daily    Historical Provider, MD   sucralfate (CARAFATE) 1 GM/10ML suspension Take 10 mLs by mouth 4 times daily 5/31/17   Samara Oneil MD   acetaminophen-codeine (TYLENOL/CODEINE #3) 300-30 MG per tablet Take 1 tablet by mouth 3 times daily as needed for Pain 5/12/17   Bhavani Manriquez, DO   Skin Protectants, Misc. (EUCERIN) cream Apply topically as needed. 4/24/17   Margaret Herron DO   gabapentin (NEURONTIN) 400 MG capsule Take 400 mg by mouth 2 times daily    Historical Provider, MD   vitamin D (ERGOCALCIFEROL) 60523 UNITS CAPS capsule Take 1 capsule by mouth once a week 1/5/17   Marylin Barksdale MD   Fluticasone Furoate-Vilanterol (BREO ELLIPTA) 200-25 MCG/INH AEPB Inhale 1 puff into the lungs daily 12/16/16   Ross Madsen MD   albuterol sulfate HFA (VENTOLIN HFA) 108 (90 BASE) MCG/ACT inhaler Inhale 2 puffs into the lungs every 6 hours as needed for Wheezing 12/13/16   Kilo Orourke MD   montelukast (SINGULAIR) 10 MG tablet Take 1 tablet by mouth nightly 12/13/16   Ross Madsen MD       REVIEW OF SYSTEMS    (2-9 systems for level 4, 10 or more for level 5)      Review of Systems   Constitutional: Negative for chills and fever. Respiratory: Negative for shortness of breath and wheezing. Cardiovascular: Positive for leg swelling. Negative for chest pain. Gastrointestinal: Positive for abdominal pain, nausea and vomiting. Endocrine: Negative for polydipsia and polyuria. Genitourinary: Negative for difficulty urinating and dysuria. Musculoskeletal: Positive for arthralgias. Negative for joint swelling.    Skin: Positive for color Abd Series Chest 1 VW    CBC Auto Differential    Comprehensive Metabolic Panel    Lipase    Brain Natriuretic Peptide    Lactic Acid, Whole Blood    PREVIOUS SPECIMEN    CK    Myoglobin, Serum    EKG 12 Lead    Insert peripheral IV     If the patient was admitted, some of the above orders may have been placed by the admitting team(s) and were auto populated above when I refreshed my note prior to signing it. If there is any question, please check for the responsible provider for individual orders in the EHR system. MEDICATIONS ORDERED:  Orders Placed This Encounter   Medications    diphenhydrAMINE (BENADRYL) injection 25 mg    prochlorperazine (COMPAZINE) injection 10 mg    DISCONTD: 0.9 % sodium chloride bolus    ondansetron (ZOFRAN) injection 4 mg    diphenhydrAMINE (BENADRYL) injection 25 mg    doxycycline monohydrate (ADOXA) 100 MG tablet     Sig: Take 1 tablet by mouth 2 times daily for 14 days     Dispense:  28 tablet     Refill:  0     If the patient was admitted, some of the above orders may have been placed by the admitting team(s) and were auto populated above when I refreshed my note prior to signing it. If there is any question, please check for the responsible provider for individual orders in the EHR system. DDX: Patient has several complaints, all of which seem chronic. His headache arises a low suspicion based on my history and physical exam for more serious pathology such as subarachnoid hemorrhage/space occupying lesion, meningitis, temporal arteritis, carbon monoxide poisoning, acute angle closure glaucoma; does have some abdominal tenderness so we'll get abdominal labs with lactate and abdominal series as he has had abdominal surgeries, given the observed lower extremity edema will check cardiac and renal function.   Has no fever, headache has been intermittent and comes on gradually and the associated symptoms of dizziness, nausea, vomiting all resolved with the headache. Reports the visual symptoms began just before headache which could signify patient has headache aura and possibly has migraines however regardless has no indication for head imaging here today based on exam    Doxycycline for cellulitis    DIAGNOSTIC RESULTS / EMERGENCY DEPARTMENT COURSE / MDM     LABS:  Labs Reviewed   CBC WITH AUTO DIFFERENTIAL - Abnormal; Notable for the following:        Result Value    Hemoglobin 12.2 (*)     Hematocrit 37.8 (*)     All other components within normal limits   COMPREHENSIVE METABOLIC PANEL - Abnormal; Notable for the following:     Glucose 104 (*)     BUN 5 (*)     Potassium 3.6 (*)     AST 45 (*)     All other components within normal limits   CK - Abnormal; Notable for the following: Total  (*)     All other components within normal limits   MYOGLOBIN, SERUM - Abnormal; Notable for the following:     Myoglobin 103 (*)     All other components within normal limits   LIPASE   BRAIN NATRIURETIC PEPTIDE   LACTIC ACID, WHOLE BLOOD   PREVIOUS SPECIMEN     If the patient was admitted, some of the above labs may have been ordered by the admitting team(s) and were auto populated above when I refreshed my note prior to signing it. If there is any question, please check for the responsible provider for individual orders in the EHR system. Additionally, some of the above labs results may still be pending. RADIOLOGY:  All forms of imaging other than ED bedside ultrasounds are viewed and interpreted by a radiologist and their interpretations are displayed below. Xr Acute Abd Series Chest 1 Vw    Result Date: 3/10/2018  EXAMINATION: ACUTE ABDOMINAL SERIES WITH SINGLE  VIEW OF THE CHEST 3/10/2018 6:48 pm COMPARISON: December 15, 2017 HISTORY: ORDERING SYSTEM PROVIDED HISTORY: tender abdomen TECHNOLOGIST PROVIDED HISTORY: Reason for exam:->tender abdomen FINDINGS: The cardiac and mediastinal contours are normal in appearance. The lungs are clear.   No pneumothorax or effusion. There is a mild amount stool burden present. No dilated loops of bowel are identified. No free air. No osseous abnormality identified. No radiographic abnormality identified in the chest or abdomen. EKG    Interpreted by me    Rhythm: normal sinus   Rate: normal  Axis: normal  Ectopy: none  Conduction: normal  ST Segments: no acute change  T Waves: no acute change  Q Waves: no acute change    Clinical Impression: normal sinus rhythm, no acute changes when compared to EKG from November 9, 2017      EMERGENCY DEPARTMENT COURSE AND MEDICAL DECISION MAKING:  ED Course      54-year-old male with a variety of complaints, see HPI, frequents emergency departments with similar, See FYIs in chart etc.  On my reassessment status post headache cocktail he reported near resolution in all of his symptoms with the exception of the itchiness in his hands. He requested more Benadryl and a dose of Zofran and then states he'll \"be ready to go\". We'll put him on a course of doxycycline for possible cellulitis of the right lower extremity. He was instructed to follow-up with his primary physician and also keep his rheumatology appointment that he has in a couple weeks. Of note his lab work demonstrated a mild anemia which is near his baseline and rectal exam was negative for blood in the stool. He has a mild hypokalemia and I encouraged increased intake of healthy fruits and vegetables. Mild nonspecific transaminase elevation not requiring any further emergent workup here today. Imaging personally reviewed and radiology interpretations reviewed as well-negative for any acute process and lactate normal.  Cardiac and renal labs negative as well. PROCEDURES:  None     CONSULTS:  None  If the patient was admitted, some of the above consults may have been placed by the admitting team(s) and were auto populated above when I refreshed my note prior to signing it.   If there is any question, please check for the responsible provider for individual orders in the EHR system. CRITICAL CARE:  None     FINAL IMPRESSION      1. Leg swelling    2. Abdominal pain, epigastric    3. Nonintractable headache, unspecified chronicity pattern, unspecified headache type    4. Cellulitis of right lower extremity    5. Anemia, unspecified type    6. Hypokalemia    7. Elevated AST (SGOT)             DISPOSITION / PLAN     DISPOSITION Decision To Discharge 03/10/2018 07:32:50 PM      If discharged, the patient was instructed to return to the emergency department with any worsening symptoms, if new symptoms arise, or if they have any other concerns. Patient was instructed not to drive home if discharged today and received pain medications or other mind-altering medications while here. Pre-hypertension/Hypertension: In the case that there was an elevated blood pressure reading for this patient today in the emergency department, the patient was informed that he or she may have pre-hypertension or hypertension. It was recommended to the patient to call the primary care provider listed in the discharge instructions or a physician of the patient's choice this week to arrange follow up for further evaluation of possible pre-hypertension or hypertension. PATIENT REFERRED TO:  primary care physician and rheumatologist    Go to         DISCHARGE MEDICATIONS:  Discharge Medication List as of 3/10/2018  7:33 PM      Doxycycline 100 mg twice daily for 14 days    Vannessa PALACIO   Emergency Medicine Resident Physician    (Please note that portions of this note were completed with a voice recognition program.  Efforts were made to edit the dictations but occasionally words are mis-transcribed.)     Vannessa Weems DO  Resident  03/10/18 [id number]

## 2018-03-13 ENCOUNTER — HOSPITAL ENCOUNTER (EMERGENCY)
Age: 28
Discharge: HOME | End: 2018-03-13
Payer: COMMERCIAL

## 2018-03-13 VITALS
SYSTOLIC BLOOD PRESSURE: 157 MMHG | TEMPERATURE: 99.1 F | OXYGEN SATURATION: 96 % | BODY MASS INDEX: 36.14 KG/M2 | RESPIRATION RATE: 18 BRPM | WEIGHT: 244 LBS | DIASTOLIC BLOOD PRESSURE: 90 MMHG | HEIGHT: 69 IN | HEART RATE: 96 BPM

## 2018-03-13 DIAGNOSIS — L29.9 ITCHING: ICD-10-CM

## 2018-03-13 DIAGNOSIS — R42 DIZZINESS: ICD-10-CM

## 2018-03-13 DIAGNOSIS — R60.0 BILATERAL LEG EDEMA: Primary | ICD-10-CM

## 2018-03-13 PROCEDURE — 99283 EMERGENCY DEPT VISIT LOW MDM: CPT

## 2018-03-13 ASSESSMENT — PAIN DESCRIPTION - DESCRIPTORS: DESCRIPTORS: ITCHING

## 2018-03-13 ASSESSMENT — PAIN DESCRIPTION - ORIENTATION: ORIENTATION: RIGHT;LEFT

## 2018-03-13 ASSESSMENT — PAIN DESCRIPTION - LOCATION: LOCATION: LEG

## 2018-03-14 ASSESSMENT — ENCOUNTER SYMPTOMS
SORE THROAT: 0
VOMITING: 0
SHORTNESS OF BREATH: 1
BACK PAIN: 0
COUGH: 1
ABDOMINAL PAIN: 0
CONSTIPATION: 0
DIARRHEA: 0
NAUSEA: 0

## 2018-03-14 NOTE — ED PROVIDER NOTES
[address] [place] [address] ED  eMERGENCY dEPARTMENT eNCOUnter      Pt Name: Adina Farmer  MRN: [id number]  [place] 1990  Date of evaluation: 3/13/2018  Provider: Nuvia De Luna NP    [address] [place] [address]       Chief Complaint   Patient presents with    Rash    Leg Swelling    Dizziness         HISTORY OF PRESENT ILLNESS  (Location/Symptom, Timing/Onset, Context/Setting, Quality, Duration, Modifying Factors, Severity.)   Adina Farmer is a 32 y.o. male who presents to the emergency department Via private auto with complaints of leg swelling, dizziness, rash and chest burning. Leg swelling is constant and does not seem to improve with elevation. No history of DVT or CHF. No kidney or liver dysfunction. He also complains of an itchy rash to both. He denies contact with new chemicals. No peeling or drainage from the hands. No decreased range of motion. No new medications. He denies throat tightness or shortness of breath with this rash. He also has intermittent dizziness and chest pain which are generally not felt at the same time. He currently denies chest pain but when is present is felt in the epigastric area and mid sternal region. No cough, generalized body aches, shortness of breath or fevers. No headaches, blurred vision, numbness or focal weakness. He was seen by his primary care physician for these symptoms states he did not have any workup or medication changes. All of these symptoms have been present for the past month aside from an isolated nosebleed today. He is a smoker. No history of CAD, CHF or diabetes. Nursing Notes were reviewed. ALLERGIES     Geodon [ziprasidone hcl];  Haldol [haloperidol]; and Iv dye [iodides]    CURRENT MEDICATIONS       Discharge Medication List as of 3/13/2018 10:19 PM      CONTINUE these medications which have NOT CHANGED    Details   doxycycline monohydrate (ADOXA) 100 MG tablet Take 1 tablet by mouth 2 times daily for 14 days, Disp-28 tablet, R-0Print MD at [facility]    [place] EGD TRANSORAL BIOPSY SINGLE/MULTIPLE N/A 3/20/2017    EGD BIOPSY performed by Rey Hammonds MD at [facility]    [place] ESOPHAGOGASTRODUODENOSCOPY TRANSORAL DIAGNOSTIC N/A 8/9/2017    EGD ESOPHAGOGASTRODUODENOSCOPY performed by Vasile Nieves MD at [address] N/A 3/20/2017    [address] performed by Rey Hammonds MD at [facility] END[facility]  2/4/16         FAMILY HISTORY           Problem Relation Age of Onset    Diabetes Father     Alcohol Abuse Father     Depression Father     Arthritis Father     High Blood Pressure Father     Other Father      aneurysm & epilepsy    Migraines Father     Arthritis Mother     Other Mother      aneurysm & epilepsy    Migraines Mother     Diabetes Brother      Aunt and uncles    Depression Brother     Mental Illness Brother     Other Brother      epilepsy    Migraines Brother     Stroke Other      Uncle    Other Brother      murdered Oct 6th, 2014    Colon Cancer Paternal Cousin 43    Other Sister      epilepsy   [address] [facility] [address] Migraines Sister      Family Status   Relation Status    Father Alive    Mother Alive    Brother     Other     Brother     Paternal Cousin     Sister         SOCIAL HISTORY      reports that he has been smoking E-Cigarettes and Cigarettes. He has a 2.50 pack-year smoking history. He has never used smokeless tobacco. He reports that he uses drugs. He reports that he does not drink alcohol. REVIEW OF SYSTEMS    (2-9 systems for level 4, 10 or more for level 5)     Review of Systems   Constitutional: Negative for activity change, appetite change, fatigue and fever. HENT: Positive for nosebleeds. Negative for congestion, sneezing and sore throat. Respiratory: Positive for cough and shortness of breath. Cardiovascular: Positive for chest pain and leg swelling.         Intermittent chest pain which he

## 2018-03-21 ENCOUNTER — TELEPHONE (OUTPATIENT)
Dept: GASTROENTEROLOGY | Age: 28
End: 2018-03-21

## 2018-03-21 ENCOUNTER — OFFICE VISIT (OUTPATIENT)
Dept: GASTROENTEROLOGY | Age: 28
End: 2018-03-21
Payer: COMMERCIAL

## 2018-03-21 VITALS
SYSTOLIC BLOOD PRESSURE: 142 MMHG | DIASTOLIC BLOOD PRESSURE: 83 MMHG | WEIGHT: 245 LBS | HEIGHT: 69 IN | OXYGEN SATURATION: 97 % | HEART RATE: 82 BPM | BODY MASS INDEX: 36.29 KG/M2

## 2018-03-21 DIAGNOSIS — K62.5 RECTAL BLEEDING: ICD-10-CM

## 2018-03-21 DIAGNOSIS — F33.40 RECURRENT MAJOR DEPRESSIVE DISORDER, IN REMISSION (HCC): ICD-10-CM

## 2018-03-21 DIAGNOSIS — R10.84 GENERALIZED ABDOMINAL PAIN: Primary | ICD-10-CM

## 2018-03-21 DIAGNOSIS — R11.2 NAUSEA AND VOMITING, INTRACTABILITY OF VOMITING NOT SPECIFIED, UNSPECIFIED VOMITING TYPE: ICD-10-CM

## 2018-03-21 PROCEDURE — 99213 OFFICE O/P EST LOW 20 MIN: CPT | Performed by: INTERNAL MEDICINE

## 2018-03-21 RX ORDER — AMOXICILLIN 875 MG/1
875 TABLET, COATED ORAL 2 TIMES DAILY
Status: ON HOLD | COMMUNITY
End: 2018-04-10 | Stop reason: ALTCHOICE

## 2018-03-21 RX ORDER — ONDANSETRON 4 MG/1
4 TABLET, FILM COATED ORAL DAILY PRN
Qty: 21 TABLET | Refills: 0 | Status: ON HOLD | OUTPATIENT
Start: 2018-03-21 | End: 2018-04-10 | Stop reason: ALTCHOICE

## 2018-03-21 RX ORDER — TRAMADOL HYDROCHLORIDE 50 MG/1
50 TABLET ORAL EVERY 6 HOURS PRN
Status: ON HOLD | COMMUNITY
End: 2018-04-10 | Stop reason: ALTCHOICE

## 2018-03-21 ASSESSMENT — ENCOUNTER SYMPTOMS
RECTAL PAIN: 0
DIARRHEA: 0
TROUBLE SWALLOWING: 0
COUGH: 0
COLOR CHANGE: 0
BLOOD IN STOOL: 1
ABDOMINAL PAIN: 1
SHORTNESS OF BREATH: 0
CHOKING: 0
ABDOMINAL DISTENTION: 1
BACK PAIN: 1
ANAL BLEEDING: 1
NAUSEA: 1
CONSTIPATION: 0
SORE THROAT: 1
VOMITING: 1

## 2018-03-21 NOTE — PROGRESS NOTES
VIEW OF THE CHEST 3/10/2018 6:48 pm COMPARISON: December 15, 2017 HISTORY: ORDERING SYSTEM PROVIDED HISTORY: tender abdomen TECHNOLOGIST PROVIDED HISTORY: Reason for exam:->tender abdomen FINDINGS: The cardiac and mediastinal contours are normal in appearance. The lungs are clear. No pneumothorax or effusion. There is a mild amount stool burden present. No dilated loops of bowel are identified. No free air. No osseous abnormality identified. No radiographic abnormality identified in the chest or abdomen. IMPRESSION: Mr. Raoul David is a 32 y.o. male with Nausea, vomiting, and diarrhea. Rectal bleeding. For EGD and Colonoscopy. Zofran As Needed. Liban Bradford MD [facility]      Please note that this chart was generated using voice recognition Dragon dictation software. Although every effort was made to ensure the accuracy of this automated transcription, some errors in transcription may have occurred.

## 2018-03-22 ENCOUNTER — HOSPITAL ENCOUNTER (EMERGENCY)
Age: 28
Discharge: HOME | End: 2018-03-22
Attending: EMERGENCY MEDICINE
Payer: COMMERCIAL

## 2018-03-22 VITALS
BODY MASS INDEX: 35.44 KG/M2 | HEART RATE: 81 BPM | OXYGEN SATURATION: 97 % | TEMPERATURE: 98.1 F | DIASTOLIC BLOOD PRESSURE: 79 MMHG | RESPIRATION RATE: 16 BRPM | SYSTOLIC BLOOD PRESSURE: 157 MMHG | WEIGHT: 240 LBS

## 2018-03-22 DIAGNOSIS — K62.5 RECTAL BLEEDING: Primary | ICD-10-CM

## 2018-03-22 LAB
ABSOLUTE EOS #: 0.1 K/UL (ref 0–0.4)
ABSOLUTE IMMATURE GRANULOCYTE: ABNORMAL K/UL (ref 0–0.3)
ABSOLUTE LYMPH #: 2.4 K/UL (ref 1–4.8)
ABSOLUTE MONO #: 0.5 K/UL (ref 0.1–1.3)
ALBUMIN SERPL-MCNC: 4.1 G/DL (ref 3.5–5.2)
ALBUMIN/GLOBULIN RATIO: ABNORMAL (ref 1–2.5)
ALP BLD-CCNC: 65 U/L (ref 40–129)
ALT SERPL-CCNC: 31 U/L (ref 5–41)
ANION GAP SERPL CALCULATED.3IONS-SCNC: 11 MMOL/L (ref 9–17)
AST SERPL-CCNC: 36 U/L
BASOPHILS # BLD: 0 % (ref 0–2)
BASOPHILS ABSOLUTE: 0 K/UL (ref 0–0.2)
BILIRUB SERPL-MCNC: 0.19 MG/DL (ref 0.3–1.2)
BUN BLDV-MCNC: 7 MG/DL (ref 6–20)
BUN/CREAT BLD: ABNORMAL (ref 9–20)
CALCIUM SERPL-MCNC: 8.7 MG/DL (ref 8.6–10.4)
CHLORIDE BLD-SCNC: 99 MMOL/L (ref 98–107)
CO2: 27 MMOL/L (ref 20–31)
CREAT SERPL-MCNC: 0.75 MG/DL (ref 0.7–1.2)
DIFFERENTIAL TYPE: ABNORMAL
EOSINOPHILS RELATIVE PERCENT: 2 % (ref 0–4)
GFR AFRICAN AMERICAN: >60 ML/MIN
GFR NON-AFRICAN AMERICAN: >60 ML/MIN
GFR SERPL CREATININE-BSD FRML MDRD: ABNORMAL ML/MIN/{1.73_M2}
GFR SERPL CREATININE-BSD FRML MDRD: ABNORMAL ML/MIN/{1.73_M2}
GLUCOSE BLD-MCNC: 110 MG/DL (ref 70–99)
HCT VFR BLD CALC: 36 % (ref 41–53)
HEMOGLOBIN: 12.3 G/DL (ref 13.5–17.5)
IMMATURE GRANULOCYTES: ABNORMAL %
INR BLD: 1
LYMPHOCYTES # BLD: 39 % (ref 24–44)
MCH RBC QN AUTO: 30.5 PG (ref 26–34)
MCHC RBC AUTO-ENTMCNC: 34.1 G/DL (ref 31–37)
MCV RBC AUTO: 89.5 FL (ref 80–100)
MONOCYTES # BLD: 9 % (ref 1–7)
NRBC AUTOMATED: ABNORMAL PER 100 WBC
PARTIAL THROMBOPLASTIN TIME: 26.4 SEC (ref 23–31)
PDW BLD-RTO: 12.8 % (ref 11.5–14.9)
PLATELET # BLD: 289 K/UL (ref 150–450)
PLATELET ESTIMATE: ABNORMAL
PMV BLD AUTO: 7.4 FL (ref 6–12)
POTASSIUM SERPL-SCNC: 3.7 MMOL/L (ref 3.7–5.3)
PROTHROMBIN TIME: 10.5 SEC (ref 9.7–12)
RBC # BLD: 4.01 M/UL (ref 4.5–5.9)
RBC # BLD: ABNORMAL 10*6/UL
SEG NEUTROPHILS: 50 % (ref 36–66)
SEGMENTED NEUTROPHILS ABSOLUTE COUNT: 3.1 K/UL (ref 1.3–9.1)
SODIUM BLD-SCNC: 137 MMOL/L (ref 135–144)
TOTAL PROTEIN: 7.5 G/DL (ref 6.4–8.3)
WBC # BLD: 6.2 K/UL (ref 3.5–11)
WBC # BLD: ABNORMAL 10*3/UL

## 2018-03-22 PROCEDURE — 80053 COMPREHEN METABOLIC PANEL: CPT

## 2018-03-22 PROCEDURE — 85610 PROTHROMBIN TIME: CPT

## 2018-03-22 PROCEDURE — 85730 THROMBOPLASTIN TIME PARTIAL: CPT

## 2018-03-22 PROCEDURE — 36415 COLL VENOUS BLD VENIPUNCTURE: CPT

## 2018-03-22 PROCEDURE — 99283 EMERGENCY DEPT VISIT LOW MDM: CPT

## 2018-03-22 PROCEDURE — 85025 COMPLETE CBC W/AUTO DIFF WBC: CPT

## 2018-03-22 ASSESSMENT — ENCOUNTER SYMPTOMS
VOMITING: 0
DIARRHEA: 0
SHORTNESS OF BREATH: 0
NAUSEA: 0
EYES NEGATIVE: 1
ABDOMINAL PAIN: 0
HEMATOCHEZIA: 1
COUGH: 0
RESPIRATORY NEGATIVE: 1
ANAL BLEEDING: 1

## 2018-03-22 ASSESSMENT — PAIN SCALES - GENERAL: PAINLEVEL_OUTOF10: 9

## 2018-03-22 ASSESSMENT — PAIN DESCRIPTION - FREQUENCY: FREQUENCY: CONTINUOUS

## 2018-03-22 ASSESSMENT — PAIN DESCRIPTION - DESCRIPTORS: DESCRIPTORS: BURNING;CONSTANT

## 2018-03-22 ASSESSMENT — PAIN DESCRIPTION - LOCATION: LOCATION: ABDOMEN;LEG

## 2018-03-22 ASSESSMENT — PAIN DESCRIPTION - PAIN TYPE: TYPE: ACUTE PAIN

## 2018-03-22 NOTE — ED PROVIDER NOTES
given primary care follow up for blood pressure check. See MDM for follow up instructions. Vitals:    Vitals:    03/22/18 1905   BP: (!) 177/86   Pulse: 104   Resp: 16   Temp: 98.1 °F (36.7 °C)   TempSrc: Oral   SpO2: 97%   Weight: 240 lb (108.9 kg)       The patient was given the following medications while in the emergency department:  No orders of the defined types were placed in this encounter. CONSULTS:  None    FINAL IMPRESSION      1. Rectal bleeding          DISPOSITION/PLAN   DISPOSITION Decision To Discharge 03/22/2018 08:08:18 PM      PATIENT REFERRED TO:  see clinic list    Call in 1 day      DISCHARGE MEDICATIONS:  New Prescriptions    No medications on file     Cristobal Robin MD  Attending Emergency Physician  Marci voice recognition software used in portions of this document.                     Corwin Garcia MD  03/22/18 2015       Corwin Garcia MD  03/22/18 1682

## 2018-03-22 NOTE — ED NOTES
Pt to ED with c/o abd pain, facial swelling and bilateral lower leg swelling. Pt states he has had multiple episodes of epistaxis and thinks his Hgb is low. Pt is awake, alert and appears to be resting comfortably in bed.       Maite Ndiaye, FLORY  03/22/18 2253

## 2018-03-28 ENCOUNTER — APPOINTMENT (OUTPATIENT)
Dept: GENERAL RADIOLOGY | Age: 28
End: 2018-03-28
Payer: COMMERCIAL

## 2018-03-28 ENCOUNTER — HOSPITAL ENCOUNTER (EMERGENCY)
Age: 28
Discharge: HOME | End: 2018-03-28
Payer: COMMERCIAL

## 2018-03-28 VITALS
BODY MASS INDEX: 35.52 KG/M2 | HEART RATE: 92 BPM | TEMPERATURE: 98.6 F | WEIGHT: 239.8 LBS | OXYGEN SATURATION: 97 % | HEIGHT: 69 IN | RESPIRATION RATE: 15 BRPM | SYSTOLIC BLOOD PRESSURE: 137 MMHG | DIASTOLIC BLOOD PRESSURE: 75 MMHG

## 2018-03-28 DIAGNOSIS — R15.9 ENCOPRESIS: ICD-10-CM

## 2018-03-28 DIAGNOSIS — K59.00 OBSTIPATION: Primary | ICD-10-CM

## 2018-03-28 LAB
ABSOLUTE EOS #: 0 K/UL (ref 0–0.4)
ABSOLUTE IMMATURE GRANULOCYTE: ABNORMAL K/UL (ref 0–0.3)
ABSOLUTE LYMPH #: 1.8 K/UL (ref 1–4.8)
ABSOLUTE MONO #: 0.4 K/UL (ref 0.2–0.8)
ALBUMIN SERPL-MCNC: 4.1 G/DL (ref 3.5–5.2)
ALBUMIN/GLOBULIN RATIO: ABNORMAL (ref 1–2.5)
ALP BLD-CCNC: 68 U/L (ref 40–129)
ALT SERPL-CCNC: 34 U/L (ref 5–41)
ANION GAP SERPL CALCULATED.3IONS-SCNC: 11 MMOL/L (ref 9–17)
AST SERPL-CCNC: 36 U/L
BASOPHILS # BLD: 0 % (ref 0–2)
BASOPHILS ABSOLUTE: 0 K/UL (ref 0–0.2)
BILIRUB SERPL-MCNC: 0.37 MG/DL (ref 0.3–1.2)
BNP INTERPRETATION: NORMAL
BUN BLDV-MCNC: 7 MG/DL (ref 6–20)
BUN/CREAT BLD: 8 (ref 9–20)
C-REACTIVE PROTEIN: 3.3 MG/L (ref 0–5)
CALCIUM SERPL-MCNC: 8.7 MG/DL (ref 8.6–10.4)
CHLORIDE BLD-SCNC: 97 MMOL/L (ref 98–107)
CO2: 27 MMOL/L (ref 20–31)
CREAT SERPL-MCNC: 0.86 MG/DL (ref 0.7–1.2)
D-DIMER QUANTITATIVE: <0.17 MG/L FEU
DIFFERENTIAL TYPE: ABNORMAL
EKG ATRIAL RATE: 84 BPM
EKG P AXIS: 50 DEGREES
EKG P-R INTERVAL: 136 MS
EKG Q-T INTERVAL: 364 MS
EKG QRS DURATION: 70 MS
EKG QTC CALCULATION (BAZETT): 430 MS
EKG R AXIS: 50 DEGREES
EKG T AXIS: 34 DEGREES
EKG VENTRICULAR RATE: 84 BPM
EOSINOPHILS RELATIVE PERCENT: 0 % (ref 1–4)
GFR AFRICAN AMERICAN: >60 ML/MIN
GFR NON-AFRICAN AMERICAN: >60 ML/MIN
GFR SERPL CREATININE-BSD FRML MDRD: ABNORMAL ML/MIN/{1.73_M2}
GFR SERPL CREATININE-BSD FRML MDRD: ABNORMAL ML/MIN/{1.73_M2}
GLUCOSE BLD-MCNC: 97 MG/DL (ref 70–99)
HCT VFR BLD CALC: 39.2 % (ref 41–53)
HEMOGLOBIN: 12.9 G/DL (ref 13.5–17.5)
IMMATURE GRANULOCYTES: ABNORMAL %
LYMPHOCYTES # BLD: 20 % (ref 24–44)
MCH RBC QN AUTO: 29.4 PG (ref 26–34)
MCHC RBC AUTO-ENTMCNC: 32.9 G/DL (ref 31–37)
MCV RBC AUTO: 89.5 FL (ref 80–100)
MONOCYTES # BLD: 5 % (ref 1–7)
MYOGLOBIN: 25 NG/ML (ref 28–72)
NRBC AUTOMATED: ABNORMAL PER 100 WBC
PDW BLD-RTO: 13.5 % (ref 11.5–14.5)
PLATELET # BLD: 330 K/UL (ref 130–400)
PLATELET ESTIMATE: ABNORMAL
PMV BLD AUTO: 7.3 FL (ref 6–12)
POTASSIUM SERPL-SCNC: 4.1 MMOL/L (ref 3.7–5.3)
PRO-BNP: <20 PG/ML
RBC # BLD: 4.38 M/UL (ref 4.5–5.9)
RBC # BLD: ABNORMAL 10*6/UL
SEDIMENTATION RATE, ERYTHROCYTE: 30 MM (ref 0–15)
SEG NEUTROPHILS: 75 % (ref 36–66)
SEGMENTED NEUTROPHILS ABSOLUTE COUNT: 6.6 K/UL (ref 1.8–7.7)
SODIUM BLD-SCNC: 135 MMOL/L (ref 135–144)
THYROXINE, FREE: 1.42 NG/DL (ref 0.93–1.7)
TOTAL PROTEIN: 7.4 G/DL (ref 6.4–8.3)
TROPONIN INTERP: ABNORMAL
TROPONIN T: <0.03 NG/ML
TSH SERPL DL<=0.05 MIU/L-ACNC: 0.59 MIU/L (ref 0.3–5)
WBC # BLD: 8.8 K/UL (ref 3.5–11)
WBC # BLD: ABNORMAL 10*3/UL

## 2018-03-28 PROCEDURE — 83874 ASSAY OF MYOGLOBIN: CPT

## 2018-03-28 PROCEDURE — 96375 TX/PRO/DX INJ NEW DRUG ADDON: CPT

## 2018-03-28 PROCEDURE — 99285 EMERGENCY DEPT VISIT HI MDM: CPT

## 2018-03-28 PROCEDURE — 85025 COMPLETE CBC W/AUTO DIFF WBC: CPT

## 2018-03-28 PROCEDURE — 85379 FIBRIN DEGRADATION QUANT: CPT

## 2018-03-28 PROCEDURE — 86140 C-REACTIVE PROTEIN: CPT

## 2018-03-28 PROCEDURE — 96374 THER/PROPH/DIAG INJ IV PUSH: CPT

## 2018-03-28 PROCEDURE — 84443 ASSAY THYROID STIM HORMONE: CPT

## 2018-03-28 PROCEDURE — 84484 ASSAY OF TROPONIN QUANT: CPT

## 2018-03-28 PROCEDURE — 93005 ELECTROCARDIOGRAM TRACING: CPT

## 2018-03-28 PROCEDURE — 74018 RADEX ABDOMEN 1 VIEW: CPT

## 2018-03-28 PROCEDURE — 80053 COMPREHEN METABOLIC PANEL: CPT

## 2018-03-28 PROCEDURE — 71045 X-RAY EXAM CHEST 1 VIEW: CPT

## 2018-03-28 PROCEDURE — 93970 EXTREMITY STUDY: CPT

## 2018-03-28 PROCEDURE — 85651 RBC SED RATE NONAUTOMATED: CPT

## 2018-03-28 PROCEDURE — 84439 ASSAY OF FREE THYROXINE: CPT

## 2018-03-28 PROCEDURE — 6360000002 HC RX W HCPCS: Performed by: PHYSICIAN ASSISTANT

## 2018-03-28 PROCEDURE — 83880 ASSAY OF NATRIURETIC PEPTIDE: CPT

## 2018-03-28 RX ORDER — IBUPROFEN 800 MG/1
800 TABLET ORAL EVERY 8 HOURS PRN
Qty: 30 TABLET | Refills: 0 | Status: ON HOLD | OUTPATIENT
Start: 2018-03-28 | End: 2018-04-10 | Stop reason: ALTCHOICE

## 2018-03-28 RX ORDER — KETOROLAC TROMETHAMINE 30 MG/ML
30 INJECTION, SOLUTION INTRAMUSCULAR; INTRAVENOUS ONCE
Status: DISCONTINUED | OUTPATIENT
Start: 2018-03-28 | End: 2018-03-28 | Stop reason: HOSPADM

## 2018-03-28 RX ORDER — ONDANSETRON 2 MG/ML
4 INJECTION INTRAMUSCULAR; INTRAVENOUS ONCE
Status: COMPLETED | OUTPATIENT
Start: 2018-03-28 | End: 2018-03-28

## 2018-03-28 RX ORDER — KETOROLAC TROMETHAMINE 30 MG/ML
60 INJECTION, SOLUTION INTRAMUSCULAR; INTRAVENOUS ONCE
Status: DISCONTINUED | OUTPATIENT
Start: 2018-03-28 | End: 2018-03-28

## 2018-03-28 RX ORDER — SODIUM PHOSPHATE, DIBASIC AND SODIUM PHOSPHATE, MONOBASIC 7; 19 G/133ML; G/133ML
1 ENEMA RECTAL
Qty: 1 BOTTLE | Refills: 1 | Status: SHIPPED | OUTPATIENT
Start: 2018-03-28 | End: 2018-03-28

## 2018-03-28 RX ORDER — DIPHENHYDRAMINE HYDROCHLORIDE 50 MG/ML
25 INJECTION INTRAMUSCULAR; INTRAVENOUS ONCE
Status: COMPLETED | OUTPATIENT
Start: 2018-03-28 | End: 2018-03-28

## 2018-03-28 RX ORDER — KETOROLAC TROMETHAMINE 30 MG/ML
30 INJECTION, SOLUTION INTRAMUSCULAR; INTRAVENOUS ONCE
Status: DISCONTINUED | OUTPATIENT
Start: 2018-03-28 | End: 2018-03-28

## 2018-03-28 RX ORDER — POLYETHYLENE GLYCOL 3350 17 G/17G
17 POWDER, FOR SOLUTION ORAL DAILY
Qty: 1 BOTTLE | Refills: 0 | Status: SHIPPED | OUTPATIENT
Start: 2018-03-28 | End: 2018-04-04

## 2018-03-28 RX ADMIN — DIPHENHYDRAMINE HYDROCHLORIDE 25 MG: 50 INJECTION, SOLUTION INTRAMUSCULAR; INTRAVENOUS at 15:14

## 2018-03-28 RX ADMIN — ONDANSETRON 4 MG: 2 INJECTION INTRAMUSCULAR; INTRAVENOUS at 15:15

## 2018-03-28 ASSESSMENT — PAIN SCALES - GENERAL
PAINLEVEL_OUTOF10: 9
PAINLEVEL_OUTOF10: 9
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
## 2018-03-28 NOTE — ED PROVIDER NOTES
needed. SUCRALFATE (CARAFATE) 1 GM/10ML SUSPENSION    Take 10 mLs by mouth 4 times daily    TIZANIDINE (ZANAFLEX) 4 MG TABLET    Take 4 mg by mouth every 6 hours as needed    TRAMADOL (ULTRAM) 50 MG TABLET    Take 50 mg by mouth every 6 hours as needed for Pain.     VITAMIN D (ERGOCALCIFEROL) 04801 UNITS CAPS CAPSULE    Take 1 capsule by mouth once a week       PAST MEDICAL HISTORY         Diagnosis Date    Anxiety     Arthritis     Bipolar I disorder, most recent episode (or current) depressed, unspecified 9/12/2014    Clostridium difficile infection     COPD [facility] obstructive [facility]) [facility]     Depression     Eczema     Fracture, metacarpal     R 4th and 5th    Gastric ulcer     GERD (gastroesophageal reflux disease)     GI bleed     H. 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[facility]     Substance abuse     Suicidal ideation     Suicide attempt by hanging ([facility]     Tobacco dependence     Ulcerative colitis ([facility]     UTI (urinary tract infection)        SURGICAL HISTORY           Procedure Laterality Date    ABDOMEN SURGERY      upper GI scope 7/7/2015    BRONCHOSCOPY      CHOLECYSTECTOMY, LAPAROSCOPIC  07/14/2017    surgery performed at [address]      lumbar puncture    MN COLONOSCOPY W/BIOPSY SINGLE/MULTIPLE  8/9/2017    COLONOSCOPY WITH BIOPSY performed by Ruthie Jones MD at [facility]    MN EGD TRANSORAL BIOPSY SINGLE/MULTIPLE N/A 3/20/2017    EGD BIOPSY performed by Tian Gallardo MD at [facility]    MN ESOPHAGOGASTRODUODENOSCOPY TRANSORAL DIAGNOSTIC N/A 8/9/2017    EGD ESOPHAGOGASTRODUODENOSCOPY performed by [facility] MD at [facility]    SIGMOIDOSCOPY N/A 3/20/2017    SIGMOIDOSCOPY DIAGNOSTIC FLEXIBLE performed by Delmar Mendez MD at [facility]    UPPER GASTROINTESTINAL ENDOSCOPY  2/4/16         FAMILY HISTORY           Problem Relation Age of Onset    Diabetes Father     Alcohol Abuse Father     Depression Father     Arthritis Father     High Blood Pressure Father     Other Father      aneurysm & epilepsy    Migraines Father     Arthritis Mother     Other Mother      aneurysm & epilepsy    Migraines Mother     Diabetes Brother      Aunt and uncles    Depression Brother     Mental Illness Brother     Other Brother      epilepsy    Migraines Brother     Stroke Other      Uncle    Other Brother      murdered Oct 6th, 2014    Colon Cancer Paternal Cousin 43    Other Sister      epilepsy   Latasha Sosa Migraines Sister      Family Status   Relation Status    Father Alive    Mother Alive    Brother     Other    Latasha Sosa Brother     Paternal Cousin     Sister         SOCIAL HISTORY      reports that he has been smoking E-Cigarettes and Cigarettes. He has a 2.50 pack-year smoking history. He has never used smokeless tobacco. He reports that he uses drugs. He reports that he does not drink alcohol. REVIEW OF SYSTEMS    (2-9 systems for level 4, 10 or more for level 5)   Review of Systems    Except as noted above the remainder of the review of systems was reviewed and negative. PHYSICAL EXAM    (up to 7 for level 4, 8 or more for level 5)     ED Triage Vitals [03/28/18 1238]   BP Temp Temp Source Pulse Resp SpO2 Height Weight   (!) 147/82 98.6 °F (37 °C) Oral 86 16 98 % 5' 9\" (1.753 m) 239 lb 12.8 oz (108.8 kg)     Physical Exam   Constitutional: He is oriented to person, place, and time. He appears well-developed. HENT:   Head: Normocephalic and atraumatic. Eyes: EOM are normal.   Neck: Normal range of motion. Neck supple. Cardiovascular: Normal rate and regular rhythm.     Pulmonary/Chest: Effort normal and breath sounds normal.   Abdominal: Soft. Musculoskeletal: Normal range of motion. Bilateral lower extremity edema   Neurological: He is alert and oriented to person, place, and time. Skin: Skin is warm. Psychiatric: He has a normal mood and affect. His behavior is normal.               DIAGNOSTIC RESULTS     EKG: All EKG's are interpreted by the Emergency Department Physician who either signs or Co-signs this chart in the absence of a cardiologist.    EKG interpreted by ED physician. Time 1227.  84 bpm.  Normal sinus rhythm. Normal axis. No ST segment elevation. RADIOLOGY:   Non-plain film images such as CT, Ultrasound and MRI are read by the radiologist. Plain radiographic images are visualized and preliminarily interpreted by the emergency physician with the below findings:        Interpretation per the Radiologist below, if available at the time of this note:          ED BEDSIDE ULTRASOUND:   Performed by ED Physician - none    LABS:  Labs Reviewed   CBC WITH AUTO DIFFERENTIAL - Abnormal; Notable for the following:        Result Value    RBC 4.38 (*)     Hemoglobin 12.9 (*)     Hematocrit 39.2 (*)     Seg Neutrophils 75 (*)     Lymphocytes 20 (*)     Eosinophils % 0 (*)     All other components within normal limits   COMPREHENSIVE METABOLIC PANEL - Abnormal; Notable for the following:     Bun/Cre Ratio 8 (*)     Chloride 97 (*)     All other components within normal limits   TROP/MYOGLOBIN - Abnormal; Notable for the following:     Myoglobin 25 (*)     All other components within normal limits   SEDIMENTATION RATE - Abnormal; Notable for the following:     Sed Rate 30 (*)     All other components within normal limits   BRAIN NATRIURETIC PEPTIDE   T4, FREE   TSH WITHOUT REFLEX   D-DIMER, QUANTITATIVE   C-REACTIVE PROTEIN   URINE RT REFLEX TO CULTURE   POCT URINALYSIS DIPSTICK       All other labs were within normal range or not returned as of this dictation.     EMERGENCY DEPARTMENT COURSE and DIFFERENTIAL

## 2018-04-04 ENCOUNTER — TELEPHONE (OUTPATIENT)
Dept: GASTROENTEROLOGY | Age: 28
End: 2018-04-04

## 2018-04-10 ENCOUNTER — HOSPITAL ENCOUNTER (INPATIENT)
Age: 28
LOS: 3 days | Discharge: HOME | DRG: 885 | End: 2018-04-13
Attending: EMERGENCY MEDICINE | Admitting: PSYCHIATRY & NEUROLOGY
Payer: COMMERCIAL

## 2018-04-10 DIAGNOSIS — F22 PARANOIA (HCC): ICD-10-CM

## 2018-04-10 DIAGNOSIS — R45.850 HOMICIDAL IDEATION: Primary | ICD-10-CM

## 2018-04-10 PROBLEM — F31.9 BIPOLAR 1 DISORDER (HCC): Status: ACTIVE | Noted: 2018-04-10

## 2018-04-10 PROBLEM — F31.9 BIPOLAR 1 DISORDER (HCC): Chronic | Status: ACTIVE | Noted: 2018-04-10

## 2018-04-10 LAB
ACETAMINOPHEN LEVEL: NORMAL UG/ML
AMPHETAMINE SCREEN URINE: NEGATIVE
BARBITURATE SCREEN URINE: NEGATIVE
BENZODIAZEPINE SCREEN, URINE: NEGATIVE
BILIRUBIN URINE: NEGATIVE
BUPRENORPHINE URINE: ABNORMAL
CANNABINOID SCREEN URINE: NEGATIVE
COCAINE METABOLITE, URINE: NEGATIVE
COLOR: YELLOW
COMMENT UA: NORMAL
ETHANOL PERCENT: NORMAL %
ETHANOL: NORMAL MG/DL
GLUCOSE URINE: NEGATIVE
KETONES, URINE: NEGATIVE
LEUKOCYTE ESTERASE, URINE: NEGATIVE
MDMA URINE: ABNORMAL
METHADONE SCREEN, URINE: NEGATIVE
METHAMPHETAMINE, URINE: ABNORMAL
NITRITE, URINE: NEGATIVE
OPIATES, URINE: POSITIVE
OXYCODONE SCREEN URINE: NEGATIVE
PH UA: 8 (ref 5–8)
PHENCYCLIDINE, URINE: NEGATIVE
PROPOXYPHENE, URINE: ABNORMAL
PROTEIN UA: NEGATIVE
SALICYLATE LEVEL: NORMAL MG/DL
SPECIFIC GRAVITY UA: 1.02 (ref 1–1.03)
TEST INFORMATION: ABNORMAL
TRICYCLIC ANTIDEP,URINE: NEGATIVE
TRICYCLIC ANTIDEPRESSANTS, UR: ABNORMAL
TURBIDITY: CLEAR
URINE HGB: NEGATIVE
UROBILINOGEN, URINE: NORMAL

## 2018-04-10 PROCEDURE — 1240000000 HC EMOTIONAL WELLNESS R&B

## 2018-04-10 PROCEDURE — G0480 DRUG TEST DEF 1-7 CLASSES: HCPCS

## 2018-04-10 PROCEDURE — 6370000000 HC RX 637 (ALT 250 FOR IP): Performed by: PSYCHIATRY & NEUROLOGY

## 2018-04-10 PROCEDURE — 80307 DRUG TEST PRSMV CHEM ANLYZR: CPT

## 2018-04-10 PROCEDURE — 6360000002 HC RX W HCPCS: Performed by: PSYCHIATRY & NEUROLOGY

## 2018-04-10 PROCEDURE — 99285 EMERGENCY DEPT VISIT HI MDM: CPT

## 2018-04-10 PROCEDURE — 81003 URINALYSIS AUTO W/O SCOPE: CPT

## 2018-04-10 PROCEDURE — 94664 DEMO&/EVAL PT USE INHALER: CPT

## 2018-04-10 RX ORDER — PROMETHAZINE HYDROCHLORIDE 12.5 MG/1
12.5 TABLET ORAL 3 TIMES DAILY PRN
Status: DISCONTINUED | OUTPATIENT
Start: 2018-04-10 | End: 2018-04-13 | Stop reason: HOSPADM

## 2018-04-10 RX ORDER — TRAZODONE HYDROCHLORIDE 50 MG/1
50 TABLET ORAL NIGHTLY PRN
Status: DISCONTINUED | OUTPATIENT
Start: 2018-04-10 | End: 2018-04-13 | Stop reason: HOSPADM

## 2018-04-10 RX ORDER — QUETIAPINE FUMARATE 200 MG/1
400 TABLET, FILM COATED ORAL NIGHTLY
Status: DISCONTINUED | OUTPATIENT
Start: 2018-04-10 | End: 2018-04-13 | Stop reason: HOSPADM

## 2018-04-10 RX ORDER — HYDROXYZINE HYDROCHLORIDE 25 MG/1
25 TABLET, FILM COATED ORAL 3 TIMES DAILY PRN
Status: DISCONTINUED | OUTPATIENT
Start: 2018-04-10 | End: 2018-04-13 | Stop reason: HOSPADM

## 2018-04-10 RX ORDER — TIZANIDINE 4 MG/1
4 TABLET ORAL 3 TIMES DAILY PRN
Status: DISCONTINUED | OUTPATIENT
Start: 2018-04-10 | End: 2018-04-13 | Stop reason: HOSPADM

## 2018-04-10 RX ORDER — MAGNESIUM HYDROXIDE/ALUMINUM HYDROXICE/SIMETHICONE 120; 1200; 1200 MG/30ML; MG/30ML; MG/30ML
30 SUSPENSION ORAL 3 TIMES DAILY PRN
Status: DISCONTINUED | OUTPATIENT
Start: 2018-04-10 | End: 2018-04-13 | Stop reason: HOSPADM

## 2018-04-10 RX ORDER — METHADONE HYDROCHLORIDE 10 MG/5ML
100 SOLUTION ORAL DAILY
Status: ON HOLD | COMMUNITY
End: 2018-07-05 | Stop reason: ALTCHOICE

## 2018-04-10 RX ORDER — HYDROXYZINE HYDROCHLORIDE 25 MG/1
25 TABLET, FILM COATED ORAL 3 TIMES DAILY PRN
Status: DISCONTINUED | OUTPATIENT
Start: 2018-04-10 | End: 2018-04-10

## 2018-04-10 RX ORDER — METHADONE HYDROCHLORIDE 10 MG/5ML
115 SOLUTION ORAL DAILY
Status: DISCONTINUED | OUTPATIENT
Start: 2018-04-10 | End: 2018-04-13 | Stop reason: HOSPADM

## 2018-04-10 RX ORDER — QUETIAPINE FUMARATE 400 MG/1
400 TABLET, FILM COATED ORAL 2 TIMES DAILY
Status: ON HOLD | COMMUNITY
End: 2018-04-10 | Stop reason: ALTCHOICE

## 2018-04-10 RX ORDER — MELOXICAM 15 MG/1
15 TABLET ORAL DAILY
COMMUNITY
End: 2018-06-15

## 2018-04-10 RX ORDER — GABAPENTIN 400 MG/1
400 CAPSULE ORAL 3 TIMES DAILY
Status: DISCONTINUED | OUTPATIENT
Start: 2018-04-10 | End: 2018-04-13 | Stop reason: HOSPADM

## 2018-04-10 RX ORDER — DULOXETIN HYDROCHLORIDE 30 MG/1
30 CAPSULE, DELAYED RELEASE ORAL DAILY
Status: DISCONTINUED | OUTPATIENT
Start: 2018-04-10 | End: 2018-04-12

## 2018-04-10 RX ORDER — ERGOCALCIFEROL 1.25 MG/1
50000 CAPSULE ORAL WEEKLY
Status: DISCONTINUED | OUTPATIENT
Start: 2018-04-10 | End: 2018-04-13 | Stop reason: HOSPADM

## 2018-04-10 RX ORDER — ONDANSETRON 4 MG/1
4 TABLET, FILM COATED ORAL DAILY
Status: ON HOLD | COMMUNITY
End: 2018-04-13 | Stop reason: HOSPADM

## 2018-04-10 RX ORDER — GABAPENTIN 400 MG/1
400 CAPSULE ORAL 3 TIMES DAILY
Status: ON HOLD | COMMUNITY
End: 2018-04-13 | Stop reason: HOSPADM

## 2018-04-10 RX ORDER — ACETAMINOPHEN 500 MG
500 TABLET ORAL EVERY 6 HOURS PRN
Status: ON HOLD | COMMUNITY
End: 2018-04-10 | Stop reason: ALTCHOICE

## 2018-04-10 RX ORDER — LOPERAMIDE HYDROCHLORIDE 2 MG/1
2 CAPSULE ORAL 2 TIMES DAILY PRN
Status: DISCONTINUED | OUTPATIENT
Start: 2018-04-10 | End: 2018-04-13 | Stop reason: HOSPADM

## 2018-04-10 RX ORDER — CLOTRIMAZOLE AND BETAMETHASONE DIPROPIONATE 10; .64 MG/G; MG/G
CREAM TOPICAL 2 TIMES DAILY
Status: DISCONTINUED | OUTPATIENT
Start: 2018-04-10 | End: 2018-04-13 | Stop reason: HOSPADM

## 2018-04-10 RX ORDER — ACETAMINOPHEN 500 MG
500 TABLET ORAL EVERY 4 HOURS PRN
Status: DISCONTINUED | OUTPATIENT
Start: 2018-04-10 | End: 2018-04-13 | Stop reason: HOSPADM

## 2018-04-10 RX ORDER — NICOTINE 21 MG/24HR
1 PATCH, TRANSDERMAL 24 HOURS TRANSDERMAL DAILY
Status: DISCONTINUED | OUTPATIENT
Start: 2018-04-10 | End: 2018-04-13 | Stop reason: HOSPADM

## 2018-04-10 RX ORDER — OXCARBAZEPINE 150 MG/1
150 TABLET, FILM COATED ORAL 2 TIMES DAILY
Status: ON HOLD | COMMUNITY
End: 2018-04-13 | Stop reason: HOSPADM

## 2018-04-10 RX ORDER — BETAMETHASONE DIPROPIONATE 0.5 MG/G
0.05 LOTION TOPICAL 2 TIMES DAILY PRN
Status: ON HOLD | COMMUNITY
End: 2018-04-13 | Stop reason: HOSPADM

## 2018-04-10 RX ORDER — ALBUTEROL SULFATE 90 UG/1
2 AEROSOL, METERED RESPIRATORY (INHALATION) EVERY 6 HOURS PRN
Status: DISCONTINUED | OUTPATIENT
Start: 2018-04-10 | End: 2018-04-13 | Stop reason: HOSPADM

## 2018-04-10 RX ORDER — DIPHENHYDRAMINE HCL 25 MG
25 TABLET ORAL DAILY
Status: DISCONTINUED | OUTPATIENT
Start: 2018-04-10 | End: 2018-04-13 | Stop reason: HOSPADM

## 2018-04-10 RX ORDER — FLUTICASONE FUROATE AND VILANTEROL 200; 25 UG/1; UG/1
1 POWDER RESPIRATORY (INHALATION) DAILY
Status: DISCONTINUED | OUTPATIENT
Start: 2018-04-10 | End: 2018-04-13 | Stop reason: RX

## 2018-04-10 RX ORDER — OXCARBAZEPINE 300 MG/1
150 TABLET, FILM COATED ORAL 2 TIMES DAILY
Status: DISCONTINUED | OUTPATIENT
Start: 2018-04-10 | End: 2018-04-12

## 2018-04-10 RX ORDER — DULOXETIN HYDROCHLORIDE 30 MG/1
30 CAPSULE, DELAYED RELEASE ORAL DAILY
Status: ON HOLD | COMMUNITY
End: 2018-04-13

## 2018-04-10 RX ORDER — TIZANIDINE 4 MG/1
4 TABLET ORAL 3 TIMES DAILY PRN
Status: ON HOLD | COMMUNITY
End: 2018-04-13 | Stop reason: HOSPADM

## 2018-04-10 RX ORDER — QUETIAPINE FUMARATE 200 MG/1
400 TABLET, FILM COATED ORAL NIGHTLY
Status: ON HOLD | COMMUNITY
End: 2018-04-13 | Stop reason: HOSPADM

## 2018-04-10 RX ORDER — DIPHENHYDRAMINE HCL 25 MG
25 CAPSULE ORAL DAILY
Status: ON HOLD | COMMUNITY
End: 2018-04-13 | Stop reason: HOSPADM

## 2018-04-10 RX ORDER — HYDROXYZINE PAMOATE 25 MG/1
25 CAPSULE ORAL 3 TIMES DAILY PRN
Status: ON HOLD | COMMUNITY
End: 2018-04-13

## 2018-04-10 RX ADMIN — ACETAMINOPHEN 500 MG: 500 TABLET ORAL at 22:03

## 2018-04-10 RX ADMIN — TIZANIDINE 4 MG: 4 TABLET ORAL at 22:03

## 2018-04-10 RX ADMIN — Medication 115 MG: at 18:44

## 2018-04-10 RX ADMIN — CLOTRIMAZOLE AND BETAMETHASONE DIPROPIONATE: 10; .5 CREAM TOPICAL at 22:05

## 2018-04-10 RX ADMIN — PROMETHAZINE HYDROCHLORIDE 12.5 MG: 12.5 TABLET ORAL at 18:31

## 2018-04-10 ASSESSMENT — PAIN SCALES - GENERAL
PAINLEVEL_OUTOF10: 3
PAINLEVEL_OUTOF10: 7
PAINLEVEL_OUTOF10: 7
PAINLEVEL_OUTOF10: 0
PAINLEVEL_OUTOF10: 6
PAINLEVEL_OUTOF10: 6

## 2018-04-10 ASSESSMENT — PAIN DESCRIPTION - PAIN TYPE
TYPE: ACUTE PAIN

## 2018-04-10 ASSESSMENT — PAIN DESCRIPTION - LOCATION
LOCATION: FOOT;LEG
LOCATION: LEG;FOOT
LOCATION: ABDOMEN
LOCATION: GENERALIZED

## 2018-04-10 ASSESSMENT — ENCOUNTER SYMPTOMS
CHEST TIGHTNESS: 0
SHORTNESS OF BREATH: 0
VOMITING: 0
COUGH: 0
SORE THROAT: 0
EYE PAIN: 0
COLOR CHANGE: 0
EYE REDNESS: 0
BLOOD IN STOOL: 0
EYE DISCHARGE: 0
WHEEZING: 0
CONSTIPATION: 0
BACK PAIN: 0
DIARRHEA: 0
ABDOMINAL PAIN: 0
RHINORRHEA: 0
TROUBLE SWALLOWING: 0
NAUSEA: 0
FACIAL SWELLING: 0
SINUS PRESSURE: 0

## 2018-04-10 ASSESSMENT — SLEEP AND FATIGUE QUESTIONNAIRES
RESTFUL SLEEP: NO
DIFFICULTY FALLING ASLEEP: YES
DIFFICULTY STAYING ASLEEP: YES
DIFFICULTY ARISING: NO
DO YOU HAVE DIFFICULTY SLEEPING: YES
AVERAGE NUMBER OF SLEEP HOURS: 0
DO YOU USE A SLEEP AID: NO

## 2018-04-10 ASSESSMENT — PAIN DESCRIPTION - DESCRIPTORS: DESCRIPTORS: BURNING

## 2018-04-10 ASSESSMENT — PAIN DESCRIPTION - ORIENTATION
ORIENTATION: RIGHT;LEFT
ORIENTATION: RIGHT;LEFT

## 2018-04-10 ASSESSMENT — PAIN DESCRIPTION - FREQUENCY: FREQUENCY: INTERMITTENT

## 2018-04-10 ASSESSMENT — LIFESTYLE VARIABLES: HISTORY_ALCOHOL_USE: NO

## 2018-04-10 NOTE — ED NOTES
Urine collected and sent to lab. This RN attempted to collect blood work and pt is being uncooperative, stating he needs his methadone dose. Dr. González Slade notified.       Spring Del Angel RN  04/10/18 7723

## 2018-04-10 NOTE — BH NOTE
[place] to Situation  Attention:Normal: No  Attention: Unable to Concentrate  Thought Processes: Circumstantial  Thought Content:Normal: No  Thought Content: Preoccupations  Hallucinations: Auditory (Comment) (his own thoughts)  Delusions: No  Memory:Normal: Yes  Insight and Judgment: No  Insight and Judgment: Poor Judgment, Poor Insight  Present Suicidal Ideation: No  Present Homicidal Ideation: No    Tobacco Screening:  Practical Counseling, on admission, reuben X, if applicable and completed (first 3 are required if patient doesn't refuse): ( x)  Recognizing danger situations (included triggers and roadblocks)                    (x )  Coping skills (new ways to manage stress, exercise, relaxation techniques, changing routine, distraction)                                                           (x )  Basic information about quitting (benefits of quitting, techniques in how to quit, available resources  ( ) Referral for counseling faxed to [facility]                                           ( ) Patient refused counseling  ( ) Patient has not smoked in the last 30 days      Pt admitted with followings belongings: . Valuables placed in safe in security envelope, number:  [id number], [id number]. Patient's home medications were put in safe. Patient oriented to surroundings and program expectations and copy of patient rights given. Received admission packet:  yes. Consents reviewed, signed yes. .                     Patient verbalize understanding:  yes.     Patient education on precautions: yes                   Aiden Lay RN

## 2018-04-10 NOTE — ED NOTES
Pt brought in by [facility] counselor for delusional thinking. Pt counselor states that he has followed with the patient for some time and \"he is deteriorating\". Per counselor, pt complains of people trying to poison him and yesterday, pt gave the counselor a name of someone he wanted to harm. Pt states that he lives at home and there's people trying to kill him and reports \"even if they keep me for a few days, they'll be there once I get out\". Pt denies SI and HI.       Kenny Brown, FLORY  04/10/18 0195

## 2018-04-11 PROCEDURE — 6360000002 HC RX W HCPCS: Performed by: PSYCHIATRY & NEUROLOGY

## 2018-04-11 PROCEDURE — 6370000000 HC RX 637 (ALT 250 FOR IP): Performed by: PSYCHIATRY & NEUROLOGY

## 2018-04-11 PROCEDURE — 1240000000 HC EMOTIONAL WELLNESS R&B

## 2018-04-11 RX ADMIN — CLOTRIMAZOLE AND BETAMETHASONE DIPROPIONATE: 10; .5 CREAM TOPICAL at 22:50

## 2018-04-11 RX ADMIN — TIZANIDINE 4 MG: 4 TABLET ORAL at 22:49

## 2018-04-11 RX ADMIN — PROMETHAZINE HYDROCHLORIDE 12.5 MG: 12.5 TABLET ORAL at 22:49

## 2018-04-11 RX ADMIN — TIZANIDINE 4 MG: 4 TABLET ORAL at 09:48

## 2018-04-11 RX ADMIN — PROMETHAZINE HYDROCHLORIDE 12.5 MG: 12.5 TABLET ORAL at 08:11

## 2018-04-11 RX ADMIN — CLOTRIMAZOLE AND BETAMETHASONE DIPROPIONATE: 10; .5 CREAM TOPICAL at 09:48

## 2018-04-11 RX ADMIN — ACETAMINOPHEN 500 MG: 500 TABLET ORAL at 22:55

## 2018-04-11 RX ADMIN — ACETAMINOPHEN 500 MG: 500 TABLET ORAL at 09:48

## 2018-04-11 RX ADMIN — Medication 115 MG: at 09:24

## 2018-04-11 RX ADMIN — GABAPENTIN 400 MG: 400 CAPSULE ORAL at 22:49

## 2018-04-11 RX ADMIN — DIPHENHYDRAMINE HCL 25 MG: 25 TABLET ORAL at 08:11

## 2018-04-11 RX ADMIN — GABAPENTIN 400 MG: 400 CAPSULE ORAL at 09:24

## 2018-04-11 ASSESSMENT — PAIN SCALES - GENERAL
PAINLEVEL_OUTOF10: 7
PAINLEVEL_OUTOF10: 1
PAINLEVEL_OUTOF10: 3
PAINLEVEL_OUTOF10: 2
PAINLEVEL_OUTOF10: 0
PAINLEVEL_OUTOF10: 7

## 2018-04-11 ASSESSMENT — PAIN DESCRIPTION - LOCATION: LOCATION: HEAD

## 2018-04-11 ASSESSMENT — PAIN DESCRIPTION - PAIN TYPE: TYPE: ACUTE PAIN

## 2018-04-11 ASSESSMENT — PAIN DESCRIPTION - FREQUENCY: FREQUENCY: INTERMITTENT

## 2018-04-11 ASSESSMENT — PAIN DESCRIPTION - DESCRIPTORS: DESCRIPTORS: ACHING

## 2018-04-11 ASSESSMENT — LIFESTYLE VARIABLES: HISTORY_ALCOHOL_USE: NO
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
## 2018-04-11 NOTE — BH NOTE
[address] [facility]                   [address] [facility], [address]                              PSYCHIATRIC EVALUATION    PATIENT NAME: Amarilis Morales                    :        1990  MED REC NO:   [id number]                              ROOM:       9491  ACCOUNT NO:   [de-identified]                           ADMIT DATE: 04/10/2018  PROVIDER:     Benny Pereira      COMPREHENSIVE PSYCHIATRIC EVALUATION    HISTORY OF PRESENTING ILLNESS AND REASON FOR CURRENT ADMISSION:  The  patient is a 60-year-old male who is having depression, auditory  hallucinations, increased psychomotor activity, feels that people are  trying to throw salt in his mouth, so that they will kill him. He feels  fearful and upset and becoming aggressive for no reason. He is admitted  from the emergency department. PAST PSYCHIATRIC HISTORY:  History of bipolar disorder, opiate dependence. MEDICAL AND SURGICAL HISTORY:  He has history of GERD and chronic pain. ALLERGIES:  He is allergic to GEODON, HALOPERIDOL AND IODINE-CONTAINING IV  DYE. PERSONAL FAMILY AND SOCIAL HISTORY:  He currently lives in his apartment. He gets Social Security disability. He has no contact with his family  members. He says he has several cousins and other family members he  occasionally meets. He has long-term history of opiate dependence. Currently, he is in enrolled in a methadone treatment-assisted program.  He  denies any recent drug use. MENTAL STATUS EXAM:  The patient is cooperative to some extent. He has  poor eye contact. He has poor rapport. His psychomotor activity is  slightly increased. His speech is within normal limits. His mood  subjectively up and down. Objectively appears to have dysphoric mood and  affect. Thought process within normal limits. Thought content  predominantly of depression, suicidal thoughts, anxiety and agitation.   He  is experiencing occasional auditory hallucinations. He has delusions of  persecution. He denies any homicidal thoughts or plans. He is of average  intelligence. He is oriented to time, place and person. His memory to  recent, and immediate events are within normal limits. He has adequate  attention and concentration. His insight and judgment are impaired. His  abstraction is concrete. DIAGNOSES:  1. Bipolar disorder, current episode depressed. 2.  Opiate dependence. 3.  GERD. TREATMENT AND PLAN:  Admit him. Start him back on his medications. Stabilize him. ESTIMATED LENGTH OF STAY:  10 days.           Samy Coleman    D: 04/10/2018 17:36:19       T: 04/10/2018 22:06:14     [email or web address]  Job#: [id number]     Doc#: [id number]    CC:

## 2018-04-11 NOTE — BH NOTE
Patient was admitted through the ER with complaints of increasing depression and suicidal ideation. Detailed note dictated    Mental Status Examination:    Appearance: grooming:alert, cooperative, severe distress  Motor Activity:psychomotor activity: slow  Speech:rate/volume:delayed, increased latency of response, normal pitch and normal volume  Attitude: cooperative with interview  Affect: labile  Mood: dysphoric  Thought Processes: linear goal directed  Thought Content: ideas of helplessness and hopelessness  Suicidal Ideation: He has suicidal thoughts and plans to kill himself and die  Homicidal Ideation: patient denies  Perceptions: Hearing voices  Insight/Judgment: impaired  Cognition: Alert, oriented to person, place, time, and situation; immediate recall 3/3 and delayed recall 3/3; concentration mildly impaired; it is evident during interview that patient's fund of knowledge is average; no language deficit noted  Axis I Bipolar disorder depressed.  Opiate dependence  Axis II None  Axis III Chronic pain  Axis IV Severe  Axis V 30  Tx plan: Safe therapeutic milieu  Medications as listed   Current Facility-Administered Medications   Medication Dose Route Frequency Provider Last Rate Last Dose    albuterol sulfate  (90 Base) MCG/ACT inhaler 2 puff  2 puff Inhalation Q6H PRN Oral PINK MD        diphenhydrAMINE (BENADRYL) tablet 25 mg  25 mg Oral Daily Oral PINK MD   25 mg at 04/11/18 5036    DULoxetine (CYMBALTA) extended release capsule 30 mg  30 mg Oral Daily Oral PNIK MD        Fluticasone Furoate-Vilanterol 200-25 MCG/INH AEPB 1 puff  1 puff Inhalation Daily Oral PINK MD        methadone 10 MG/5ML solution 115 mg  115 mg Oral Daily Oral PINK MD   115 mg at 04/10/18 1844    OXcarbazepine (TRILEPTAL) tablet 150 mg  150 mg Oral BID Oral PINK MD        QUEtiapine (SEROQUEL) tablet 400 mg  400 mg Oral Nightly Robson Day MD
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
## 2018-04-11 NOTE — BH NOTE

## 2018-04-12 PROCEDURE — 6360000002 HC RX W HCPCS: Performed by: PSYCHIATRY & NEUROLOGY

## 2018-04-12 PROCEDURE — 6370000000 HC RX 637 (ALT 250 FOR IP): Performed by: PSYCHIATRY & NEUROLOGY

## 2018-04-12 PROCEDURE — 93005 ELECTROCARDIOGRAM TRACING: CPT

## 2018-04-12 PROCEDURE — 1240000000 HC EMOTIONAL WELLNESS R&B

## 2018-04-12 RX ORDER — CITALOPRAM 10 MG/1
10 TABLET ORAL DAILY
Status: DISCONTINUED | OUTPATIENT
Start: 2018-04-12 | End: 2018-04-13 | Stop reason: HOSPADM

## 2018-04-12 RX ADMIN — MAGNESIUM HYDROXIDE 10 ML: 400 SUSPENSION ORAL at 23:16

## 2018-04-12 RX ADMIN — ACETAMINOPHEN 500 MG: 500 TABLET ORAL at 23:16

## 2018-04-12 RX ADMIN — TIZANIDINE 4 MG: 4 TABLET ORAL at 17:41

## 2018-04-12 RX ADMIN — ACETAMINOPHEN 500 MG: 500 TABLET ORAL at 17:41

## 2018-04-12 RX ADMIN — GABAPENTIN 400 MG: 400 CAPSULE ORAL at 22:17

## 2018-04-12 RX ADMIN — GABAPENTIN 400 MG: 400 CAPSULE ORAL at 15:04

## 2018-04-12 RX ADMIN — GABAPENTIN 400 MG: 400 CAPSULE ORAL at 09:16

## 2018-04-12 RX ADMIN — ACETAMINOPHEN 500 MG: 500 TABLET ORAL at 03:40

## 2018-04-12 RX ADMIN — PROMETHAZINE HYDROCHLORIDE 12.5 MG: 12.5 TABLET ORAL at 09:27

## 2018-04-12 RX ADMIN — CLOTRIMAZOLE AND BETAMETHASONE DIPROPIONATE: 10; .5 CREAM TOPICAL at 09:59

## 2018-04-12 RX ADMIN — ACETAMINOPHEN 500 MG: 500 TABLET ORAL at 11:52

## 2018-04-12 RX ADMIN — PROMETHAZINE HYDROCHLORIDE 12.5 MG: 12.5 TABLET ORAL at 17:41

## 2018-04-12 RX ADMIN — DIPHENHYDRAMINE HCL 25 MG: 25 TABLET ORAL at 09:16

## 2018-04-12 RX ADMIN — Medication 115 MG: at 09:14

## 2018-04-12 ASSESSMENT — PAIN DESCRIPTION - PAIN TYPE
TYPE: ACUTE PAIN

## 2018-04-12 ASSESSMENT — PAIN DESCRIPTION - ORIENTATION
ORIENTATION: RIGHT;LEFT

## 2018-04-12 ASSESSMENT — PAIN SCALES - GENERAL
PAINLEVEL_OUTOF10: 6
PAINLEVEL_OUTOF10: 5
PAINLEVEL_OUTOF10: 0
PAINLEVEL_OUTOF10: 0
PAINLEVEL_OUTOF10: 5
PAINLEVEL_OUTOF10: 0
PAINLEVEL_OUTOF10: 3
PAINLEVEL_OUTOF10: 2
PAINLEVEL_OUTOF10: 8
PAINLEVEL_OUTOF10: 3

## 2018-04-12 ASSESSMENT — PAIN DESCRIPTION - DESCRIPTORS
DESCRIPTORS: ACHING
DESCRIPTORS: ACHING

## 2018-04-12 ASSESSMENT — PAIN DESCRIPTION - LOCATION
LOCATION: LEG

## 2018-04-12 ASSESSMENT — PAIN DESCRIPTION - FREQUENCY: FREQUENCY: INTERMITTENT

## 2018-04-12 ASSESSMENT — PAIN DESCRIPTION - ONSET: ONSET: PROGRESSIVE

## 2018-04-12 NOTE — BH NOTE
Reviewed agreeable medication changes, and offered to patient as ordered, pt declined to take stating that he wants to talk to Dr. Gwen Samuels about his medications and possible go back to the medications that was just discontinued. Education provided, and importance of compliance reviewed.

## 2018-04-12 NOTE — PLAN OF CARE
Problem: Anger Management/Homicidal Ideation:  Goal: Ability to verbalize frustrations and anger appropriately will improve  Ability to verbalize frustrations and anger appropriately will improve   Outcome: Ongoing  PSYCHOTHERAPY GROUP NOTE       Date:      4/12/18            Start Time:          10:00               End Time: 11:00      Number Participants in Group: 11/18      Goals: To participate in group psychotherapy and remain in the here and now. RT X SW  Nsg  LPN   BHTII  Other       Participation Level:     None X Minimal   X Active Listener  Interactive    Monopolizing         Participation Quality:  X Appropriate  Inappropriate   X  Attentive   Intrusive     Sharing   Resistant     Supportive    Lethargic       Affective:    Congruent  Incongruent  Blunted  Flat    Constricted  Anxious  Elated  Angry    Labile  Depressed  Other         Cognitive:  X Alert X Oriented PPTS     Concentration G X F  P    Attention Span G X F  P    Short-Term Memory G  F X P    Long-Term Memory G  F X P    ProblemSolving/  Decision Making G  F X P    Ability to Process  Information G X F  P       Contributing Factors             Delusional             Hallucinating             Flight of Ideas             Other: poor concentration       Modes of Intervention:   Education X Support  Exploration   X Clarifying X Problem Solving  Confrontation   X Socialization X Limit Setting  Reality Testing    Activity  Movement  Media    Other:          Response to Learning:  X Able to verbalize current knowledge/experience   X Able to verbalize/acknowledge new learning    Able to retain information    Capable of insight    Able to change behavior   X Progressing to goal    Other:        Comments: PT participates in group minimally and PT left group early.

## 2018-04-12 NOTE — PLAN OF CARE
Preoccupations  Hallucinations: None  Delusions: No  Memory:Normal: Yes  Memory: Poor Recent, Confabulation  Insight and Judgment: No  Insight and Judgment: Unmotivated  Present Suicidal Ideation: No  Present Homicidal Ideation: No    Daily Assessment Last Entry:   Daily Sleep (WDL): Within Defined Limits         Patient Currently in Pain: No  Daily Nutrition (WDL): Within Defined Limits    Patient Monitoring:  Frequency of Checks: 4 times per hour, close    Psychiatric Symptoms:   Depression Symptoms  Depression Symptoms: Isolative, Loss of interest  Anxiety Symptoms  Anxiety Symptoms: Generalized  Elizabeth Symptoms  Elizabeth Symptoms: No problems reported or observed. Psychosis Symptoms  Delusion Type: No problems reported or observed. Suicide Risk CSSR-S:  Have you wished you were dead or wished you could go to sleep and not wake up? : NO  Have you actually had any thoughts of killing yourself? : NO  Have you ever done anything, started to do anything, or prepared to do anything to end your life?: NO  Change in Result No    Change in Plan of care No      EDUCATION:   EDUCATION:   Learner Progress Toward Treatment Goals: Reviewed results and recommendations of this team, Reviewed group plan and strategies, Reviewed signs, symptoms and risk of self harm and violent behavior, Reviewed goals and plan of care    Method:small group, individual verbal education    Outcome:verbalized by patient, but needs reinforcement to obtain goals    PATIENT GOALS:  Short term: \"To improve sleep and to be able to read and concentrate. \"  Long term: \"To take my medications and keep negative people away from me. \"    PLAN/TREATMENT RECOMMENDATIONS UPDATE: continue with group therapies, increased socialization, continue planning for after discharge goals, continue with medication compliance    SHORT-TERM GOALS UPDATE:   Time frame for Short-Term Goals: 5-7 days    LONG-TERM GOALS UPDATE:   Time frame for Long-Term Goals: 6 months  Members Present in Team Meeting: See Signature Sheet    DIANE Skelton  Goal: Absence of angry outbursts  Absence of angry outbursts   Outcome: Ongoing    Goal: Absence of homicidal ideation  Absence of homicidal ideation   Outcome: Ongoing

## 2018-04-13 VITALS
HEIGHT: 69 IN | SYSTOLIC BLOOD PRESSURE: 152 MMHG | BODY MASS INDEX: 37.03 KG/M2 | WEIGHT: 250 LBS | HEART RATE: 60 BPM | DIASTOLIC BLOOD PRESSURE: 93 MMHG | OXYGEN SATURATION: 98 % | TEMPERATURE: 97.2 F | RESPIRATION RATE: 14 BRPM

## 2018-04-13 PROBLEM — F31.9 BIPOLAR 1 DISORDER (HCC): Chronic | Status: RESOLVED | Noted: 2018-04-10 | Resolved: 2018-04-13

## 2018-04-13 PROCEDURE — 6360000002 HC RX W HCPCS: Performed by: PSYCHIATRY & NEUROLOGY

## 2018-04-13 PROCEDURE — 5130000000 HC BRIDGE APPOINTMENT

## 2018-04-13 PROCEDURE — 6370000000 HC RX 637 (ALT 250 FOR IP): Performed by: PSYCHIATRY & NEUROLOGY

## 2018-04-13 RX ORDER — ALBUTEROL SULFATE 90 UG/1
2 AEROSOL, METERED RESPIRATORY (INHALATION) EVERY 6 HOURS PRN
Qty: 18 G | Refills: 0 | Status: ON HOLD | OUTPATIENT
Start: 2018-04-13 | End: 2018-08-13

## 2018-04-13 RX ORDER — TRAZODONE HYDROCHLORIDE 50 MG/1
50 TABLET ORAL NIGHTLY PRN
Qty: 30 TABLET | Refills: 0 | Status: SHIPPED | OUTPATIENT
Start: 2018-04-13 | End: 2018-05-11 | Stop reason: ALTCHOICE

## 2018-04-13 RX ORDER — HYDROXYZINE PAMOATE 25 MG/1
25 CAPSULE ORAL 3 TIMES DAILY PRN
Qty: 90 CAPSULE | Refills: 0 | Status: SHIPPED | OUTPATIENT
Start: 2018-04-13 | End: 2018-06-15

## 2018-04-13 RX ORDER — QUETIAPINE FUMARATE 400 MG/1
400 TABLET, FILM COATED ORAL NIGHTLY
Qty: 60 TABLET | Refills: 0 | Status: ON HOLD | OUTPATIENT
Start: 2018-04-13 | End: 2018-06-26

## 2018-04-13 RX ORDER — DULOXETIN HYDROCHLORIDE 30 MG/1
30 CAPSULE, DELAYED RELEASE ORAL DAILY
Qty: 30 CAPSULE | Refills: 0 | Status: ON HOLD | OUTPATIENT
Start: 2018-04-13 | End: 2018-06-20

## 2018-04-13 RX ORDER — FLUTICASONE FUROATE AND VILANTEROL 200; 25 UG/1; UG/1
1 POWDER RESPIRATORY (INHALATION) DAILY
Qty: 1 EACH | Refills: 0 | Status: ON HOLD | OUTPATIENT
Start: 2018-04-13 | End: 2018-08-13

## 2018-04-13 RX ADMIN — Medication 115 MG: at 08:33

## 2018-04-13 RX ADMIN — PROMETHAZINE HYDROCHLORIDE 12.5 MG: 12.5 TABLET ORAL at 08:28

## 2018-04-13 RX ADMIN — GABAPENTIN 400 MG: 400 CAPSULE ORAL at 08:28

## 2018-04-13 RX ADMIN — ACETAMINOPHEN 500 MG: 500 TABLET ORAL at 08:27

## 2018-04-13 RX ADMIN — GABAPENTIN 400 MG: 400 CAPSULE ORAL at 13:26

## 2018-04-13 RX ADMIN — DIPHENHYDRAMINE HCL 25 MG: 25 TABLET ORAL at 08:27

## 2018-04-13 ASSESSMENT — PAIN SCALES - GENERAL
PAINLEVEL_OUTOF10: 2
PAINLEVEL_OUTOF10: 4
PAINLEVEL_OUTOF10: 2
PAINLEVEL_OUTOF10: 8

## 2018-04-13 NOTE — DISCHARGE SUMMARY
[address]                   [address] [place], [address]                                 DISCHARGE SUMMARY    PATIENT NAME: Lori Barrow                    :        1990  MED REC NO:   [id number]                              ROOM:       0221  ACCOUNT NO:   [de-identified]                           ADMIT DATE: 04/10/2018  PROVIDER:     Shade Paige                   [facility] DATE:    HISTORY OF PRESENTING ILLNESS AND REASON FOR CURRENT ADMISSION:  The  patient is a 49-year-old male, feeling depressed and sad, experiencing  auditory hallucinations, and confused and has suicidal thoughts, and with  this, he is admitted to [address] [place] [address]:  History of bipolar disorder and opioid  dependence. He is currently on methadone due to medication assisted  treatment program.    MEDICAL AND SURGICAL HISTORY:  He has a history of arthritis. ALLERGIES:  He is allergic to GEODON, HALOPERIDOL, IODINE CONTINUING IV  DYE, DICYCLOMINE, and FAMOTIDINE. COURSE DURING THE HOSPITAL STAY:  After getting admitted, he was started  back on methadone 150 mg once a day and Cymbalta 30 mg daily. His  Trileptal is discontinued and Seroquel 400 mg at bedtime. With this, he is  stabilized and he is being discharged home. MENTAL STATUS EXAMINATION:  The patient is cooperative. He has adequate  eye contact. He has adequate psychomotor activity. He has adequate  rapport. His speech is within normal limits. His mood subjectively okay,  objectively appears to be euthymic. He has appropriate affect. Thought  process and contents are within normal limits. He denies any  hallucinations or delusions. He denies any suicidal or homicidal thoughts  or plans. He is of average intelligence. He is oriented to time, place,  and person. His memory to recent, remote, and immediate events are within  normal limits.   He has adequate attention and concentration. His insight  and judgment are fair. His abstraction is concrete. DIAGNOSES:  1. Bipolar disorder, current episode depressed. 2.  Opioid dependence. 3.  Arthritis. TREATMENT AND PLAN:  The patient is discharged. He will follow with [facility] and his PCP.         Dameon Palumbo    D: 04/13/2018 10:29:45       T: 04/13/2018 10:46:27     [email or web address]  Job#: [id number]     Doc#: [id number]    CC:

## 2018-04-13 NOTE — BH NOTE
Psychoeducation Group Note    Date: 4/13/18                  Start Time: 845AM  End Time: 915AM    Number Participants in Group:  7    Goal:  Patient will demonstrate increased interpersonal interaction   Topic: COMMUNITY MEETING/GOALS GROUP    Discipline Responsible:   OT  AT  [facility]. X RT  Other       Participation Level:     None X Minimal   X Active Listener  Interactive    Monopolizing         Participation Quality:   Appropriate  Inappropriate   X       Attentive        Intrusive   X       Sharing X       Resistant: TO TOPIC          Supportive X       Lethargic       Affective:    Congruent  Incongruent X Blunted  Flat    Constricted  Anxious  Elated  Angry    Labile  Depressed  Other         Cognitive:  X Alert X Oriented PPTP     Concentration  G X F  P   Attention Span  G X F  P   Short-Term Memory  G  F  P   Long-Term Memory  G  F  P   ProblemSolving/  Decision Making  G X F  P   Ability to Process  Information X G  F  P      Contributing Factors             Delusional             Hallucinating             Flight of Ideas             Other:       Modes of Intervention:  X Education X Support X Exploration   X Clarifying  Problem Solving  Confrontation    Socialization  Limit Setting  Reality Testing   X Activity  Movement  Media    Other:            Response to Learning:  X Able to verbalize current knowledge/experience    Able to verbalize/acknowledge new learning:PT IS RESISTANT    Able to retain information    Capable of insight    Able to change behavior    Progressing to goal    Other:        Comments:

## 2018-04-13 NOTE — DISCHARGE SUMMARY
Psychiatry: Discharge Note  Patient is stable. He  denies any hallucinations or delusions. He denies any suicidal or homicidal thoughts or plans. [facility] He verbalizes a plan to f/u with outpatient care and keep himself safe  Discharge diagnosis:  Axis I: Bipolar disorder depressed, Opiate [id number]  Axis II: None  Axis III : Arthritis  Axis IV : Lack of support  Axis V 45  Discharge Medications:   Current Discharge Medication List           Details   traZODone (DESYREL) 50 MG tablet Take 1 tablet by mouth nightly as needed for Sleep  Qty: 30 tablet, Refills: 0              Details   hydrOXYzine (VISTARIL) 25 MG capsule Take 1 capsule by mouth 3 times daily as needed for Itching  Qty: 90 capsule, Refills: 0      albuterol sulfate HFA (VENTOLIN HFA) 108 (90 Base) MCG/ACT inhaler Inhale 2 puffs into the lungs every 6 hours as needed for Wheezing  Qty: 18 g, Refills: 0      Fluticasone Furoate-Vilanterol (BREO ELLIPTA) 200-25 MCG/INH AEPB Inhale 1 puff into the lungs daily  Qty: 1 each, Refills: 0      DULoxetine (CYMBALTA) 30 MG extended release capsule Take 1 capsule by mouth daily  Qty: 30 capsule, Refills: 0      QUEtiapine (SEROQUEL) 400 MG tablet Take 1 tablet by mouth nightly  Qty: 60 tablet, Refills: 0              Details   meloxicam (MOBIC) 15 MG tablet Take 15 mg by mouth daily      methadone 10 MG/5ML solution Take 115 mg by mouth daily.       vitamin D (ERGOCALCIFEROL) 67575 UNITS CAPS capsule Take 1 capsule by mouth once a week  Qty: 30 capsule, Refills: 3    Associated Diagnoses: Juvenile rheumatoid arthritis ([facility]           Electronically signed by Olga Norris MD on 4/13/2018 at 10:24 AM

## 2018-04-13 NOTE — PROGRESS NOTES
Pt did not attend 0845 community meeting/ goals  group d/t resting in room despite staff invitation to attend

## 2018-04-13 NOTE — CARE COORDINATION
DISCHARGE PLANNING UPDATE:  - Writer provides PT with a variety of community resources to include inpatient treatment facilities, sober living, low income apartment list, food pantries, [facility] and other resources within the community. - PT very rude and demanding help with placement immediately. Writer explains to PT since he met with doctor today and told Dr. Donald Brennan he wanted to be discharged, Ashlee Larose can only provide information regarding inpatient treatment and PT will need to call himself. - PT frustrated. PT then tells nurse needs to be discharged by 1:00pm for an appointment at [facility]. Writer calls to verify if PT has 1pm appointment at [facility] detox unit and advised by Nevaeh \"general appointment\" today for anyone at 1pm, they don't do admissions for detox by walk-in, and do not set-up appointments. Detox PT's come from ED and writer tries to explain this to PT.  - PT then could not confirm anything about the 1pm appointment and states \"I don't have to tell you where I need to go and what doctor I am seeing, just get me out of here by 1pm.\"  Writer informs PT reason for trying to confirm 1pm appointment is a request made by nurse and that we could then prioritize discharges so can be discharged in time for appointment.  - PT states \"I don't care what you are doing, just get me the hell out of here. \"  - PT then caught whispering to PT MRN - [id number] after discussion with writer. PT then comes over and asks writer for a journal.  Zachary Buchanan tries to explain to PT no more on this unit and would need to go downstairs and will check later.

## 2018-04-13 NOTE — BH NOTE
Patient given tobacco quitline number 20296946595 at this time, refusing to call at this time, states \" I just dont want to quit now\"- patient given information as to the dangers of long term tobacco use. Continue to reinforce the importance of tobacco cessation.

## 2018-04-26 ENCOUNTER — HOSPITAL ENCOUNTER (EMERGENCY)
Age: 28
Discharge: HOME | End: 2018-04-26
Attending: EMERGENCY MEDICINE
Payer: COMMERCIAL

## 2018-04-26 ENCOUNTER — APPOINTMENT (OUTPATIENT)
Dept: GENERAL RADIOLOGY | Age: 28
End: 2018-04-26
Payer: COMMERCIAL

## 2018-04-26 VITALS
SYSTOLIC BLOOD PRESSURE: 136 MMHG | BODY MASS INDEX: 35.55 KG/M2 | RESPIRATION RATE: 14 BRPM | DIASTOLIC BLOOD PRESSURE: 88 MMHG | TEMPERATURE: 98.2 F | OXYGEN SATURATION: 98 % | WEIGHT: 240 LBS | HEART RATE: 88 BPM | HEIGHT: 69 IN

## 2018-04-26 DIAGNOSIS — R07.9 CHEST PAIN, UNSPECIFIED TYPE: Primary | ICD-10-CM

## 2018-04-26 LAB
ABSOLUTE EOS #: 0.04 K/UL (ref 0–0.44)
ABSOLUTE IMMATURE GRANULOCYTE: <0.03 K/UL (ref 0–0.3)
ABSOLUTE LYMPH #: 1.88 K/UL (ref 1.1–3.7)
ABSOLUTE MONO #: 0.52 K/UL (ref 0.1–1.2)
ANION GAP SERPL CALCULATED.3IONS-SCNC: 14 MMOL/L (ref 9–17)
BASOPHILS # BLD: 0 % (ref 0–2)
BASOPHILS ABSOLUTE: <0.03 K/UL (ref 0–0.2)
BUN BLDV-MCNC: 6 MG/DL (ref 6–20)
BUN/CREAT BLD: ABNORMAL (ref 9–20)
CALCIUM SERPL-MCNC: 9.2 MG/DL (ref 8.6–10.4)
CHLORIDE BLD-SCNC: 98 MMOL/L (ref 98–107)
CO2: 27 MMOL/L (ref 20–31)
CREAT SERPL-MCNC: 0.72 MG/DL (ref 0.7–1.2)
DIFFERENTIAL TYPE: NORMAL
EKG ATRIAL RATE: 88 BPM
EKG P AXIS: 29 DEGREES
EKG P-R INTERVAL: 124 MS
EKG Q-T INTERVAL: 354 MS
EKG QRS DURATION: 72 MS
EKG QTC CALCULATION (BAZETT): 428 MS
EKG R AXIS: 40 DEGREES
EKG T AXIS: 36 DEGREES
EKG VENTRICULAR RATE: 88 BPM
EOSINOPHILS RELATIVE PERCENT: 1 % (ref 1–4)
GFR AFRICAN AMERICAN: >60 ML/MIN
GFR NON-AFRICAN AMERICAN: >60 ML/MIN
GFR SERPL CREATININE-BSD FRML MDRD: ABNORMAL ML/MIN/{1.73_M2}
GFR SERPL CREATININE-BSD FRML MDRD: ABNORMAL ML/MIN/{1.73_M2}
GLUCOSE BLD-MCNC: 122 MG/DL (ref 70–99)
HCT VFR BLD CALC: 46.4 % (ref 40.7–50.3)
HEMOGLOBIN: 15 G/DL (ref 13–17)
IMMATURE GRANULOCYTES: 0 %
LYMPHOCYTES # BLD: 27 % (ref 24–43)
MCH RBC QN AUTO: 28.4 PG (ref 25.2–33.5)
MCHC RBC AUTO-ENTMCNC: 32.3 G/DL (ref 28.4–34.8)
MCV RBC AUTO: 87.9 FL (ref 82.6–102.9)
MONOCYTES # BLD: 7 % (ref 3–12)
NRBC AUTOMATED: 0 PER 100 WBC
PDW BLD-RTO: 12.7 % (ref 11.8–14.4)
PLATELET # BLD: 322 K/UL (ref 138–453)
PLATELET ESTIMATE: NORMAL
PMV BLD AUTO: 9.6 FL (ref 8.1–13.5)
POC TROPONIN I: 0 NG/ML (ref 0–0.1)
POC TROPONIN I: 0 NG/ML (ref 0–0.1)
POC TROPONIN INTERP: NORMAL
POC TROPONIN INTERP: NORMAL
POTASSIUM SERPL-SCNC: 3.8 MMOL/L (ref 3.7–5.3)
RBC # BLD: 5.28 M/UL (ref 4.21–5.77)
RBC # BLD: NORMAL 10*6/UL
SEG NEUTROPHILS: 65 % (ref 36–65)
SEGMENTED NEUTROPHILS ABSOLUTE COUNT: 4.63 K/UL (ref 1.5–8.1)
SODIUM BLD-SCNC: 139 MMOL/L (ref 135–144)
WBC # BLD: 7.1 K/UL (ref 3.5–11.3)
WBC # BLD: NORMAL 10*3/UL

## 2018-04-26 PROCEDURE — 85025 COMPLETE CBC W/AUTO DIFF WBC: CPT

## 2018-04-26 PROCEDURE — 6360000002 HC RX W HCPCS: Performed by: STUDENT IN AN ORGANIZED HEALTH CARE EDUCATION/TRAINING PROGRAM

## 2018-04-26 PROCEDURE — 93005 ELECTROCARDIOGRAM TRACING: CPT

## 2018-04-26 PROCEDURE — 80048 BASIC METABOLIC PNL TOTAL CA: CPT

## 2018-04-26 PROCEDURE — 71046 X-RAY EXAM CHEST 2 VIEWS: CPT

## 2018-04-26 PROCEDURE — 99285 EMERGENCY DEPT VISIT HI MDM: CPT

## 2018-04-26 PROCEDURE — 96374 THER/PROPH/DIAG INJ IV PUSH: CPT

## 2018-04-26 PROCEDURE — 84484 ASSAY OF TROPONIN QUANT: CPT

## 2018-04-26 PROCEDURE — 96375 TX/PRO/DX INJ NEW DRUG ADDON: CPT

## 2018-04-26 PROCEDURE — 96376 TX/PRO/DX INJ SAME DRUG ADON: CPT

## 2018-04-26 RX ORDER — DIPHENHYDRAMINE HYDROCHLORIDE 50 MG/ML
10 INJECTION INTRAMUSCULAR; INTRAVENOUS ONCE
Status: COMPLETED | OUTPATIENT
Start: 2018-04-26 | End: 2018-04-26

## 2018-04-26 RX ORDER — KETOROLAC TROMETHAMINE 15 MG/ML
15 INJECTION, SOLUTION INTRAMUSCULAR; INTRAVENOUS ONCE
Status: COMPLETED | OUTPATIENT
Start: 2018-04-26 | End: 2018-04-26

## 2018-04-26 RX ORDER — PANTOPRAZOLE SODIUM 40 MG/1
40 TABLET, DELAYED RELEASE ORAL
Status: DISCONTINUED | OUTPATIENT
Start: 2018-04-27 | End: 2018-04-26 | Stop reason: HOSPADM

## 2018-04-26 RX ORDER — ONDANSETRON 4 MG/1
4 TABLET, FILM COATED ORAL ONCE
Status: DISCONTINUED | OUTPATIENT
Start: 2018-04-26 | End: 2018-04-26 | Stop reason: HOSPADM

## 2018-04-26 RX ORDER — DIPHENHYDRAMINE HYDROCHLORIDE 50 MG/ML
12.5 INJECTION INTRAMUSCULAR; INTRAVENOUS ONCE
Status: COMPLETED | OUTPATIENT
Start: 2018-04-26 | End: 2018-04-26

## 2018-04-26 RX ORDER — ONDANSETRON 4 MG/1
4 TABLET, ORALLY DISINTEGRATING ORAL EVERY 8 HOURS PRN
Qty: 12 TABLET | Refills: 0 | Status: SHIPPED | OUTPATIENT
Start: 2018-04-26 | End: 2018-06-15

## 2018-04-26 RX ORDER — DIPHENHYDRAMINE HCL 25 MG
25 CAPSULE ORAL EVERY 6 HOURS PRN
Qty: 12 CAPSULE | Refills: 0 | Status: SHIPPED | OUTPATIENT
Start: 2018-04-26 | End: 2018-06-15

## 2018-04-26 RX ADMIN — DIPHENHYDRAMINE HYDROCHLORIDE 10 MG: 50 INJECTION, SOLUTION INTRAMUSCULAR; INTRAVENOUS at 14:03

## 2018-04-26 RX ADMIN — KETOROLAC TROMETHAMINE 15 MG: 15 INJECTION, SOLUTION INTRAMUSCULAR; INTRAVENOUS at 14:04

## 2018-04-26 RX ADMIN — DIPHENHYDRAMINE HYDROCHLORIDE 12.5 MG: 50 INJECTION, SOLUTION INTRAMUSCULAR; INTRAVENOUS at 16:04

## 2018-04-26 ASSESSMENT — ENCOUNTER SYMPTOMS
WHEEZING: 0
NAUSEA: 1
BACK PAIN: 0
ABDOMINAL PAIN: 0
SHORTNESS OF BREATH: 0
VOMITING: 0

## 2018-04-26 ASSESSMENT — PAIN SCALES - GENERAL
PAINLEVEL_OUTOF10: 8
PAINLEVEL_OUTOF10: 8

## 2018-04-26 ASSESSMENT — PAIN DESCRIPTION - PAIN TYPE: TYPE: ACUTE PAIN

## 2018-04-26 ASSESSMENT — PAIN DESCRIPTION - LOCATION: LOCATION: HEAD;CHEST

## 2018-04-26 ASSESSMENT — HEART SCORE: ECG: 0

## 2018-05-09 ENCOUNTER — HOSPITAL ENCOUNTER (EMERGENCY)
Age: 28
Discharge: HOME | End: 2018-05-10
Attending: EMERGENCY MEDICINE
Payer: COMMERCIAL

## 2018-05-09 ENCOUNTER — HOSPITAL ENCOUNTER (EMERGENCY)
Age: 28
Discharge: HOME | End: 2018-05-09
Attending: EMERGENCY MEDICINE
Payer: COMMERCIAL

## 2018-05-09 ENCOUNTER — APPOINTMENT (OUTPATIENT)
Dept: GENERAL RADIOLOGY | Age: 28
End: 2018-05-09
Payer: COMMERCIAL

## 2018-05-09 VITALS
HEART RATE: 81 BPM | SYSTOLIC BLOOD PRESSURE: 142 MMHG | WEIGHT: 240 LBS | HEIGHT: 69 IN | BODY MASS INDEX: 35.55 KG/M2 | OXYGEN SATURATION: 95 % | RESPIRATION RATE: 18 BRPM | TEMPERATURE: 98.2 F | DIASTOLIC BLOOD PRESSURE: 82 MMHG

## 2018-05-09 VITALS
HEART RATE: 62 BPM | SYSTOLIC BLOOD PRESSURE: 112 MMHG | DIASTOLIC BLOOD PRESSURE: 85 MMHG | WEIGHT: 240 LBS | TEMPERATURE: 97.6 F | OXYGEN SATURATION: 96 % | BODY MASS INDEX: 35.55 KG/M2 | HEIGHT: 69 IN | RESPIRATION RATE: 16 BRPM

## 2018-05-09 DIAGNOSIS — R10.84 GENERALIZED ABDOMINAL PAIN: ICD-10-CM

## 2018-05-09 DIAGNOSIS — R42 LIGHTHEADEDNESS: ICD-10-CM

## 2018-05-09 DIAGNOSIS — R10.84 GENERALIZED ABDOMINAL PAIN: Primary | ICD-10-CM

## 2018-05-09 DIAGNOSIS — R11.11 VOMITING WITHOUT NAUSEA, INTRACTABILITY OF VOMITING NOT SPECIFIED, UNSPECIFIED VOMITING TYPE: Primary | ICD-10-CM

## 2018-05-09 LAB
ABSOLUTE EOS #: <0.03 K/UL (ref 0–0.44)
ABSOLUTE IMMATURE GRANULOCYTE: <0.03 K/UL (ref 0–0.3)
ABSOLUTE LYMPH #: 2.33 K/UL (ref 1.1–3.7)
ABSOLUTE MONO #: 0.54 K/UL (ref 0.1–1.2)
ALBUMIN SERPL-MCNC: 4.1 G/DL (ref 3.5–5.2)
ALBUMIN/GLOBULIN RATIO: 1.1 (ref 1–2.5)
ALP BLD-CCNC: 75 U/L (ref 40–129)
ALT SERPL-CCNC: 20 U/L (ref 5–41)
ANION GAP SERPL CALCULATED.3IONS-SCNC: 15 MMOL/L (ref 9–17)
AST SERPL-CCNC: 24 U/L
BASOPHILS # BLD: 0 % (ref 0–2)
BASOPHILS ABSOLUTE: <0.03 K/UL (ref 0–0.2)
BILIRUB SERPL-MCNC: 0.31 MG/DL (ref 0.3–1.2)
BILIRUBIN DIRECT: <0.08 MG/DL
BILIRUBIN, INDIRECT: NORMAL MG/DL (ref 0–1)
BUN BLDV-MCNC: 8 MG/DL (ref 6–20)
BUN/CREAT BLD: NORMAL (ref 9–20)
CALCIUM SERPL-MCNC: 9.2 MG/DL (ref 8.6–10.4)
CHLORIDE BLD-SCNC: 101 MMOL/L (ref 98–107)
CO2: 25 MMOL/L (ref 20–31)
CREAT SERPL-MCNC: 0.88 MG/DL (ref 0.7–1.2)
DIFFERENTIAL TYPE: ABNORMAL
EKG ATRIAL RATE: 86 BPM
EKG P AXIS: 52 DEGREES
EKG P-R INTERVAL: 142 MS
EKG Q-T INTERVAL: 354 MS
EKG QRS DURATION: 74 MS
EKG QTC CALCULATION (BAZETT): 423 MS
EKG R AXIS: 47 DEGREES
EKG T AXIS: 29 DEGREES
EKG VENTRICULAR RATE: 86 BPM
EOSINOPHILS RELATIVE PERCENT: 0 % (ref 1–4)
GFR AFRICAN AMERICAN: >60 ML/MIN
GFR NON-AFRICAN AMERICAN: >60 ML/MIN
GFR SERPL CREATININE-BSD FRML MDRD: NORMAL ML/MIN/{1.73_M2}
GFR SERPL CREATININE-BSD FRML MDRD: NORMAL ML/MIN/{1.73_M2}
GLOBULIN: NORMAL G/DL (ref 1.5–3.8)
GLUCOSE BLD-MCNC: 90 MG/DL (ref 70–99)
HCT VFR BLD CALC: 40.7 % (ref 40.7–50.3)
HEMOGLOBIN: 13.1 G/DL (ref 13–17)
IMMATURE GRANULOCYTES: 0 %
INR BLD: 1
LIPASE: 20 U/L (ref 13–60)
LYMPHOCYTES # BLD: 29 % (ref 24–43)
MCH RBC QN AUTO: 28.7 PG (ref 25.2–33.5)
MCHC RBC AUTO-ENTMCNC: 32.2 G/DL (ref 28.4–34.8)
MCV RBC AUTO: 89.3 FL (ref 82.6–102.9)
MONOCYTES # BLD: 7 % (ref 3–12)
NRBC AUTOMATED: 0 PER 100 WBC
PARTIAL THROMBOPLASTIN TIME: 24.8 SEC (ref 20.5–30.5)
PDW BLD-RTO: 13 % (ref 11.8–14.4)
PLATELET # BLD: 262 K/UL (ref 138–453)
PLATELET ESTIMATE: ABNORMAL
PMV BLD AUTO: 9.6 FL (ref 8.1–13.5)
POTASSIUM SERPL-SCNC: 3.9 MMOL/L (ref 3.7–5.3)
PROTHROMBIN TIME: 10.9 SEC (ref 9–12)
RBC # BLD: 4.56 M/UL (ref 4.21–5.77)
RBC # BLD: ABNORMAL 10*6/UL
SEG NEUTROPHILS: 64 % (ref 36–65)
SEGMENTED NEUTROPHILS ABSOLUTE COUNT: 5.01 K/UL (ref 1.5–8.1)
SODIUM BLD-SCNC: 141 MMOL/L (ref 135–144)
TOTAL PROTEIN: 7.7 G/DL (ref 6.4–8.3)
WBC # BLD: 7.9 K/UL (ref 3.5–11.3)
WBC # BLD: ABNORMAL 10*3/UL

## 2018-05-09 PROCEDURE — 96375 TX/PRO/DX INJ NEW DRUG ADDON: CPT

## 2018-05-09 PROCEDURE — 85025 COMPLETE CBC W/AUTO DIFF WBC: CPT

## 2018-05-09 PROCEDURE — 85610 PROTHROMBIN TIME: CPT

## 2018-05-09 PROCEDURE — 83690 ASSAY OF LIPASE: CPT

## 2018-05-09 PROCEDURE — 99285 EMERGENCY DEPT VISIT HI MDM: CPT

## 2018-05-09 PROCEDURE — 80048 BASIC METABOLIC PNL TOTAL CA: CPT

## 2018-05-09 PROCEDURE — 80076 HEPATIC FUNCTION PANEL: CPT

## 2018-05-09 PROCEDURE — 6360000002 HC RX W HCPCS: Performed by: EMERGENCY MEDICINE

## 2018-05-09 PROCEDURE — 93005 ELECTROCARDIOGRAM TRACING: CPT

## 2018-05-09 PROCEDURE — 74022 RADEX COMPL AQT ABD SERIES: CPT

## 2018-05-09 PROCEDURE — 99284 EMERGENCY DEPT VISIT MOD MDM: CPT

## 2018-05-09 PROCEDURE — 2580000003 HC RX 258: Performed by: EMERGENCY MEDICINE

## 2018-05-09 PROCEDURE — C9113 INJ PANTOPRAZOLE SODIUM, VIA: HCPCS | Performed by: EMERGENCY MEDICINE

## 2018-05-09 PROCEDURE — 96374 THER/PROPH/DIAG INJ IV PUSH: CPT

## 2018-05-09 PROCEDURE — 85730 THROMBOPLASTIN TIME PARTIAL: CPT

## 2018-05-09 RX ORDER — PROMETHAZINE HYDROCHLORIDE 25 MG/1
25 TABLET ORAL EVERY 6 HOURS PRN
Qty: 18 TABLET | Refills: 0 | Status: SHIPPED | OUTPATIENT
Start: 2018-05-09 | End: 2018-05-16

## 2018-05-09 RX ORDER — 0.9 % SODIUM CHLORIDE 0.9 %
10 VIAL (ML) INJECTION ONCE
Status: DISCONTINUED | OUTPATIENT
Start: 2018-05-09 | End: 2018-05-09 | Stop reason: HOSPADM

## 2018-05-09 RX ORDER — DIPHENHYDRAMINE HCL 25 MG
25 CAPSULE ORAL EVERY 6 HOURS PRN
Qty: 20 CAPSULE | Refills: 0 | Status: SHIPPED | OUTPATIENT
Start: 2018-05-09 | End: 2018-05-11 | Stop reason: SDUPTHER

## 2018-05-09 RX ORDER — PROMETHAZINE HYDROCHLORIDE 25 MG/ML
12.5 INJECTION, SOLUTION INTRAMUSCULAR; INTRAVENOUS ONCE
Status: COMPLETED | OUTPATIENT
Start: 2018-05-09 | End: 2018-05-09

## 2018-05-09 RX ORDER — PANTOPRAZOLE SODIUM 40 MG/10ML
40 INJECTION, POWDER, LYOPHILIZED, FOR SOLUTION INTRAVENOUS ONCE
Status: COMPLETED | OUTPATIENT
Start: 2018-05-09 | End: 2018-05-09

## 2018-05-09 RX ORDER — DIPHENHYDRAMINE HYDROCHLORIDE 50 MG/ML
25 INJECTION INTRAMUSCULAR; INTRAVENOUS ONCE
Status: COMPLETED | OUTPATIENT
Start: 2018-05-09 | End: 2018-05-09

## 2018-05-09 RX ORDER — 0.9 % SODIUM CHLORIDE 0.9 %
1000 INTRAVENOUS SOLUTION INTRAVENOUS ONCE
Status: COMPLETED | OUTPATIENT
Start: 2018-05-09 | End: 2018-05-09

## 2018-05-09 RX ORDER — PANTOPRAZOLE SODIUM 20 MG/1
20 TABLET, DELAYED RELEASE ORAL 2 TIMES DAILY
Qty: 60 TABLET | Refills: 0 | Status: SHIPPED | OUTPATIENT
Start: 2018-05-09 | End: 2018-05-29

## 2018-05-09 RX ADMIN — SODIUM CHLORIDE 1000 ML: 9 INJECTION, SOLUTION INTRAVENOUS at 14:17

## 2018-05-09 RX ADMIN — PROMETHAZINE HYDROCHLORIDE 12.5 MG: 25 INJECTION INTRAMUSCULAR; INTRAVENOUS at 14:17

## 2018-05-09 RX ADMIN — PANTOPRAZOLE SODIUM 40 MG: 40 INJECTION, POWDER, FOR SOLUTION INTRAVENOUS at 14:31

## 2018-05-09 RX ADMIN — DIPHENHYDRAMINE HYDROCHLORIDE 25 MG: 50 INJECTION, SOLUTION INTRAMUSCULAR; INTRAVENOUS at 14:17

## 2018-05-09 ASSESSMENT — ENCOUNTER SYMPTOMS
NAUSEA: 1
DIARRHEA: 0
SHORTNESS OF BREATH: 0
COUGH: 0
BLOOD IN STOOL: 0
VOMITING: 1
ANAL BLEEDING: 0
ABDOMINAL PAIN: 1
SORE THROAT: 0
CHEST TIGHTNESS: 0
EYE DISCHARGE: 0

## 2018-05-09 ASSESSMENT — PAIN DESCRIPTION - ORIENTATION
ORIENTATION: UPPER
ORIENTATION_2: LEFT;RIGHT

## 2018-05-09 ASSESSMENT — PAIN SCALES - GENERAL
PAINLEVEL_OUTOF10: 9
PAINLEVEL_OUTOF10: 8

## 2018-05-09 ASSESSMENT — PAIN DESCRIPTION - LOCATION
LOCATION_2: FOOT
LOCATION: ABDOMEN

## 2018-05-09 ASSESSMENT — PAIN DESCRIPTION - INTENSITY: RATING_2: 9

## 2018-05-09 ASSESSMENT — PAIN DESCRIPTION - DESCRIPTORS: DESCRIPTORS: THROBBING

## 2018-05-10 LAB
ABSOLUTE EOS #: 0 K/UL (ref 0–0.4)
ABSOLUTE IMMATURE GRANULOCYTE: NORMAL K/UL (ref 0–0.3)
ABSOLUTE LYMPH #: 2.6 K/UL (ref 1–4.8)
ABSOLUTE MONO #: 0.4 K/UL (ref 0.1–1.3)
ALBUMIN SERPL-MCNC: 4.4 G/DL (ref 3.5–5.2)
ALBUMIN/GLOBULIN RATIO: ABNORMAL (ref 1–2.5)
ALP BLD-CCNC: 85 U/L (ref 40–129)
ALT SERPL-CCNC: 21 U/L (ref 5–41)
ANION GAP SERPL CALCULATED.3IONS-SCNC: 12 MMOL/L (ref 9–17)
AST SERPL-CCNC: 29 U/L
BASOPHILS # BLD: 1 % (ref 0–2)
BASOPHILS ABSOLUTE: 0.1 K/UL (ref 0–0.2)
BILIRUB SERPL-MCNC: 0.34 MG/DL (ref 0.3–1.2)
BUN BLDV-MCNC: 7 MG/DL (ref 6–20)
BUN/CREAT BLD: ABNORMAL (ref 9–20)
CALCIUM SERPL-MCNC: 9.4 MG/DL (ref 8.6–10.4)
CHLORIDE BLD-SCNC: 97 MMOL/L (ref 98–107)
CO2: 28 MMOL/L (ref 20–31)
CREAT SERPL-MCNC: 0.88 MG/DL (ref 0.7–1.2)
DIFFERENTIAL TYPE: NORMAL
EOSINOPHILS RELATIVE PERCENT: 1 % (ref 0–4)
GFR AFRICAN AMERICAN: >60 ML/MIN
GFR NON-AFRICAN AMERICAN: >60 ML/MIN
GFR SERPL CREATININE-BSD FRML MDRD: ABNORMAL ML/MIN/{1.73_M2}
GFR SERPL CREATININE-BSD FRML MDRD: ABNORMAL ML/MIN/{1.73_M2}
GLUCOSE BLD-MCNC: 80 MG/DL (ref 70–99)
HCT VFR BLD CALC: 43.4 % (ref 41–53)
HEMOGLOBIN: 14.9 G/DL (ref 13.5–17.5)
IMMATURE GRANULOCYTES: NORMAL %
LIPASE: 28 U/L (ref 13–60)
LYMPHOCYTES # BLD: 35 % (ref 24–44)
MCH RBC QN AUTO: 30.1 PG (ref 26–34)
MCHC RBC AUTO-ENTMCNC: 34.4 G/DL (ref 31–37)
MCV RBC AUTO: 87.6 FL (ref 80–100)
MONOCYTES # BLD: 5 % (ref 1–7)
NRBC AUTOMATED: NORMAL PER 100 WBC
PDW BLD-RTO: 13.5 % (ref 11.5–14.9)
PLATELET # BLD: 231 K/UL (ref 150–450)
PLATELET ESTIMATE: NORMAL
PMV BLD AUTO: 8.1 FL (ref 6–12)
POTASSIUM SERPL-SCNC: 4.2 MMOL/L (ref 3.7–5.3)
RBC # BLD: 4.96 M/UL (ref 4.5–5.9)
RBC # BLD: NORMAL 10*6/UL
SEG NEUTROPHILS: 58 % (ref 36–66)
SEGMENTED NEUTROPHILS ABSOLUTE COUNT: 4.2 K/UL (ref 1.3–9.1)
SODIUM BLD-SCNC: 137 MMOL/L (ref 135–144)
TOTAL CK: 104 U/L (ref 39–308)
TOTAL PROTEIN: 8.2 G/DL (ref 6.4–8.3)
WBC # BLD: 7.3 K/UL (ref 3.5–11)
WBC # BLD: NORMAL 10*3/UL

## 2018-05-10 PROCEDURE — 82550 ASSAY OF CK (CPK): CPT

## 2018-05-10 PROCEDURE — 36415 COLL VENOUS BLD VENIPUNCTURE: CPT

## 2018-05-10 PROCEDURE — 80053 COMPREHEN METABOLIC PANEL: CPT

## 2018-05-10 PROCEDURE — 83690 ASSAY OF LIPASE: CPT

## 2018-05-10 PROCEDURE — 85025 COMPLETE CBC W/AUTO DIFF WBC: CPT

## 2018-05-10 RX ORDER — DIPHENHYDRAMINE HCL 25 MG
25 TABLET ORAL ONCE
Status: DISCONTINUED | OUTPATIENT
Start: 2018-05-10 | End: 2018-05-10 | Stop reason: HOSPADM

## 2018-05-10 RX ORDER — ONDANSETRON 2 MG/ML
4 INJECTION INTRAMUSCULAR; INTRAVENOUS ONCE
Status: DISCONTINUED | OUTPATIENT
Start: 2018-05-10 | End: 2018-05-10 | Stop reason: HOSPADM

## 2018-05-10 RX ORDER — 0.9 % SODIUM CHLORIDE 0.9 %
500 INTRAVENOUS SOLUTION INTRAVENOUS ONCE
Status: DISCONTINUED | OUTPATIENT
Start: 2018-05-10 | End: 2018-05-10 | Stop reason: HOSPADM

## 2018-05-10 ASSESSMENT — ENCOUNTER SYMPTOMS
SHORTNESS OF BREATH: 0
ABDOMINAL PAIN: 1
VOMITING: 1
TROUBLE SWALLOWING: 0
COUGH: 0
NAUSEA: 1

## 2018-05-11 ENCOUNTER — APPOINTMENT (OUTPATIENT)
Dept: GENERAL RADIOLOGY | Age: 28
End: 2018-05-11
Payer: COMMERCIAL

## 2018-05-11 ENCOUNTER — HOSPITAL ENCOUNTER (EMERGENCY)
Age: 28
Discharge: HOME | End: 2018-05-11
Attending: EMERGENCY MEDICINE
Payer: COMMERCIAL

## 2018-05-11 VITALS
HEART RATE: 73 BPM | HEIGHT: 69 IN | BODY MASS INDEX: 35.55 KG/M2 | OXYGEN SATURATION: 95 % | DIASTOLIC BLOOD PRESSURE: 79 MMHG | TEMPERATURE: 97.7 F | RESPIRATION RATE: 14 BRPM | SYSTOLIC BLOOD PRESSURE: 132 MMHG | WEIGHT: 240 LBS

## 2018-05-11 DIAGNOSIS — K59.00 CONSTIPATION, UNSPECIFIED CONSTIPATION TYPE: ICD-10-CM

## 2018-05-11 DIAGNOSIS — F32.A DEPRESSION, UNSPECIFIED DEPRESSION TYPE: ICD-10-CM

## 2018-05-11 DIAGNOSIS — R10.84 GENERALIZED ABDOMINAL PAIN: Primary | ICD-10-CM

## 2018-05-11 LAB
-: ABNORMAL
AMORPHOUS: ABNORMAL
AMPHETAMINE SCREEN URINE: NEGATIVE
BACTERIA: ABNORMAL
BARBITURATE SCREEN URINE: NEGATIVE
BENZODIAZEPINE SCREEN, URINE: NEGATIVE
BILIRUBIN URINE: ABNORMAL
BUPRENORPHINE URINE: ABNORMAL
CANNABINOID SCREEN URINE: NEGATIVE
CASTS UA: ABNORMAL /LPF
COCAINE METABOLITE, URINE: NEGATIVE
COLOR: YELLOW
COMMENT UA: ABNORMAL
CRYSTALS, UA: ABNORMAL /HPF
EPITHELIAL CELLS UA: ABNORMAL /HPF
GLUCOSE URINE: NEGATIVE
KETONES, URINE: NEGATIVE
LEUKOCYTE ESTERASE, URINE: NEGATIVE
MDMA URINE: ABNORMAL
METHADONE SCREEN, URINE: POSITIVE
METHAMPHETAMINE, URINE: ABNORMAL
MUCUS: ABNORMAL
NITRITE, URINE: NEGATIVE
OPIATES, URINE: NEGATIVE
OTHER OBSERVATIONS UA: ABNORMAL
OXYCODONE SCREEN URINE: NEGATIVE
PH UA: 5.5 (ref 5–8)
PHENCYCLIDINE, URINE: NEGATIVE
PROPOXYPHENE, URINE: ABNORMAL
PROTEIN UA: NEGATIVE
RBC UA: ABNORMAL /HPF
RENAL EPITHELIAL, UA: ABNORMAL /HPF
SPECIFIC GRAVITY UA: 1.02 (ref 1–1.03)
TEST INFORMATION: ABNORMAL
TRICHOMONAS: ABNORMAL
TRICYCLIC ANTIDEPRESSANTS, UR: ABNORMAL
TURBIDITY: CLEAR
URINE HGB: NEGATIVE
UROBILINOGEN, URINE: NORMAL
WBC UA: ABNORMAL /HPF
YEAST: ABNORMAL

## 2018-05-11 PROCEDURE — 6360000002 HC RX W HCPCS: Performed by: EMERGENCY MEDICINE

## 2018-05-11 PROCEDURE — 80307 DRUG TEST PRSMV CHEM ANLYZR: CPT

## 2018-05-11 PROCEDURE — 99284 EMERGENCY DEPT VISIT MOD MDM: CPT

## 2018-05-11 PROCEDURE — 81001 URINALYSIS AUTO W/SCOPE: CPT

## 2018-05-11 PROCEDURE — 74018 RADEX ABDOMEN 1 VIEW: CPT

## 2018-05-11 RX ORDER — ONDANSETRON 4 MG/1
4 TABLET, ORALLY DISINTEGRATING ORAL ONCE
Status: COMPLETED | OUTPATIENT
Start: 2018-05-11 | End: 2018-05-11

## 2018-05-11 RX ORDER — DOCUSATE SODIUM 100 MG/1
100 CAPSULE, LIQUID FILLED ORAL 2 TIMES DAILY
Qty: 30 CAPSULE | Refills: 0 | Status: ON HOLD | OUTPATIENT
Start: 2018-05-11 | End: 2018-06-26 | Stop reason: HOSPADM

## 2018-05-11 RX ADMIN — ONDANSETRON 4 MG: 4 TABLET, ORALLY DISINTEGRATING ORAL at 16:55

## 2018-05-11 ASSESSMENT — ENCOUNTER SYMPTOMS
DIARRHEA: 0
SHORTNESS OF BREATH: 0
EYE DISCHARGE: 0
SORE THROAT: 0
BLURRED VISION: 0
WHEEZING: 0
NAUSEA: 1
VOMITING: 0
COUGH: 0
ABDOMINAL PAIN: 1
CONSTIPATION: 0

## 2018-05-11 ASSESSMENT — PAIN DESCRIPTION - PAIN TYPE: TYPE: ACUTE PAIN

## 2018-05-11 ASSESSMENT — PAIN SCALES - GENERAL: PAINLEVEL_OUTOF10: 6

## 2018-05-11 ASSESSMENT — PAIN DESCRIPTION - LOCATION: LOCATION: ABDOMEN

## 2018-05-11 ASSESSMENT — PAIN DESCRIPTION - ONSET: ONSET: PROGRESSIVE

## 2018-05-11 ASSESSMENT — PAIN DESCRIPTION - DESCRIPTORS: DESCRIPTORS: SHARP

## 2018-05-11 ASSESSMENT — PAIN DESCRIPTION - ORIENTATION: ORIENTATION: MID;UPPER

## 2018-05-29 ENCOUNTER — HOSPITAL ENCOUNTER (EMERGENCY)
Age: 28
Discharge: HOME | End: 2018-05-29
Attending: EMERGENCY MEDICINE
Payer: COMMERCIAL

## 2018-05-29 VITALS
OXYGEN SATURATION: 96 % | TEMPERATURE: 98.1 F | HEART RATE: 72 BPM | BODY MASS INDEX: 35.55 KG/M2 | RESPIRATION RATE: 20 BRPM | DIASTOLIC BLOOD PRESSURE: 76 MMHG | WEIGHT: 240 LBS | HEIGHT: 69 IN | SYSTOLIC BLOOD PRESSURE: 135 MMHG

## 2018-05-29 DIAGNOSIS — R10.84 GENERALIZED ABDOMINAL PAIN: Primary | ICD-10-CM

## 2018-05-29 DIAGNOSIS — Z86.59 HISTORY OF BIPOLAR DISORDER: ICD-10-CM

## 2018-05-29 DIAGNOSIS — R11.0 NAUSEA: ICD-10-CM

## 2018-05-29 LAB
ABSOLUTE EOS #: 0.05 K/UL (ref 0–0.44)
ABSOLUTE IMMATURE GRANULOCYTE: <0.03 K/UL (ref 0–0.3)
ABSOLUTE LYMPH #: 2.17 K/UL (ref 1.1–3.7)
ABSOLUTE MONO #: 0.55 K/UL (ref 0.1–1.2)
ALBUMIN SERPL-MCNC: 4.6 G/DL (ref 3.5–5.2)
ALBUMIN/GLOBULIN RATIO: 1.4 (ref 1–2.5)
ALP BLD-CCNC: 73 U/L (ref 40–129)
ALT SERPL-CCNC: 28 U/L (ref 5–41)
ANION GAP SERPL CALCULATED.3IONS-SCNC: 13 MMOL/L (ref 9–17)
AST SERPL-CCNC: 34 U/L
BASOPHILS # BLD: 0 % (ref 0–2)
BASOPHILS ABSOLUTE: <0.03 K/UL (ref 0–0.2)
BILIRUB SERPL-MCNC: 0.29 MG/DL (ref 0.3–1.2)
BILIRUBIN URINE: NEGATIVE
BUN BLDV-MCNC: 11 MG/DL (ref 6–20)
BUN/CREAT BLD: ABNORMAL (ref 9–20)
CALCIUM SERPL-MCNC: 9.2 MG/DL (ref 8.6–10.4)
CHLORIDE BLD-SCNC: 99 MMOL/L (ref 98–107)
CO2: 26 MMOL/L (ref 20–31)
COLOR: YELLOW
COMMENT UA: ABNORMAL
CREAT SERPL-MCNC: 0.94 MG/DL (ref 0.7–1.2)
DIFFERENTIAL TYPE: NORMAL
EOSINOPHILS RELATIVE PERCENT: 1 % (ref 1–4)
GFR AFRICAN AMERICAN: >60 ML/MIN
GFR NON-AFRICAN AMERICAN: >60 ML/MIN
GFR SERPL CREATININE-BSD FRML MDRD: ABNORMAL ML/MIN/{1.73_M2}
GFR SERPL CREATININE-BSD FRML MDRD: ABNORMAL ML/MIN/{1.73_M2}
GLUCOSE BLD-MCNC: 96 MG/DL (ref 70–99)
GLUCOSE URINE: NEGATIVE
HCT VFR BLD CALC: 44 % (ref 40.7–50.3)
HEMOGLOBIN: 14.2 G/DL (ref 13–17)
IMMATURE GRANULOCYTES: 0 %
KETONES, URINE: ABNORMAL
LEUKOCYTE ESTERASE, URINE: NEGATIVE
LIPASE: 26 U/L (ref 13–60)
LYMPHOCYTES # BLD: 32 % (ref 24–43)
MCH RBC QN AUTO: 29.3 PG (ref 25.2–33.5)
MCHC RBC AUTO-ENTMCNC: 32.3 G/DL (ref 28.4–34.8)
MCV RBC AUTO: 90.7 FL (ref 82.6–102.9)
MONOCYTES # BLD: 8 % (ref 3–12)
NITRITE, URINE: NEGATIVE
NRBC AUTOMATED: 0 PER 100 WBC
PDW BLD-RTO: 12.8 % (ref 11.8–14.4)
PH UA: 5 (ref 5–8)
PLATELET # BLD: 255 K/UL (ref 138–453)
PLATELET ESTIMATE: NORMAL
PMV BLD AUTO: 10 FL (ref 8.1–13.5)
POTASSIUM SERPL-SCNC: 4.1 MMOL/L (ref 3.7–5.3)
PROTEIN UA: NEGATIVE
RBC # BLD: 4.85 M/UL (ref 4.21–5.77)
RBC # BLD: NORMAL 10*6/UL
SEG NEUTROPHILS: 59 % (ref 36–65)
SEGMENTED NEUTROPHILS ABSOLUTE COUNT: 4 K/UL (ref 1.5–8.1)
SODIUM BLD-SCNC: 138 MMOL/L (ref 135–144)
SPECIFIC GRAVITY UA: 1.04 (ref 1–1.03)
TOTAL PROTEIN: 7.8 G/DL (ref 6.4–8.3)
TURBIDITY: CLEAR
URINE HGB: NEGATIVE
UROBILINOGEN, URINE: NORMAL
WBC # BLD: 6.8 K/UL (ref 3.5–11.3)
WBC # BLD: NORMAL 10*3/UL

## 2018-05-29 PROCEDURE — 6360000002 HC RX W HCPCS: Performed by: RADIOLOGY

## 2018-05-29 PROCEDURE — 99284 EMERGENCY DEPT VISIT MOD MDM: CPT

## 2018-05-29 PROCEDURE — 96374 THER/PROPH/DIAG INJ IV PUSH: CPT

## 2018-05-29 PROCEDURE — 85025 COMPLETE CBC W/AUTO DIFF WBC: CPT

## 2018-05-29 PROCEDURE — 80053 COMPREHEN METABOLIC PANEL: CPT

## 2018-05-29 PROCEDURE — 81003 URINALYSIS AUTO W/O SCOPE: CPT

## 2018-05-29 PROCEDURE — 83690 ASSAY OF LIPASE: CPT

## 2018-05-29 PROCEDURE — 2580000003 HC RX 258: Performed by: RADIOLOGY

## 2018-05-29 PROCEDURE — 96375 TX/PRO/DX INJ NEW DRUG ADDON: CPT

## 2018-05-29 RX ORDER — DIPHENHYDRAMINE HCL 25 MG
25 TABLET ORAL ONCE
Status: DISCONTINUED | OUTPATIENT
Start: 2018-05-29 | End: 2018-05-29

## 2018-05-29 RX ORDER — PROMETHAZINE HYDROCHLORIDE 12.5 MG/1
12.5 TABLET ORAL EVERY 6 HOURS PRN
Qty: 8 TABLET | Refills: 0 | Status: SHIPPED | OUTPATIENT
Start: 2018-05-29 | End: 2018-06-05

## 2018-05-29 RX ORDER — DIPHENHYDRAMINE HYDROCHLORIDE 50 MG/ML
25 INJECTION INTRAMUSCULAR; INTRAVENOUS ONCE
Status: COMPLETED | OUTPATIENT
Start: 2018-05-29 | End: 2018-05-29

## 2018-05-29 RX ORDER — ONDANSETRON 4 MG/1
4 TABLET, FILM COATED ORAL ONCE
Status: DISCONTINUED | OUTPATIENT
Start: 2018-05-29 | End: 2018-05-29

## 2018-05-29 RX ORDER — KETOROLAC TROMETHAMINE 30 MG/ML
30 INJECTION, SOLUTION INTRAMUSCULAR; INTRAVENOUS ONCE
Status: COMPLETED | OUTPATIENT
Start: 2018-05-29 | End: 2018-05-29

## 2018-05-29 RX ORDER — PANTOPRAZOLE SODIUM 40 MG/1
40 TABLET, DELAYED RELEASE ORAL DAILY
Qty: 10 TABLET | Refills: 0 | Status: SHIPPED | OUTPATIENT
Start: 2018-05-29 | End: 2018-06-15

## 2018-05-29 RX ORDER — ONDANSETRON 2 MG/ML
4 INJECTION INTRAMUSCULAR; INTRAVENOUS ONCE
Status: COMPLETED | OUTPATIENT
Start: 2018-05-29 | End: 2018-05-29

## 2018-05-29 RX ORDER — ACETAMINOPHEN 325 MG/1
650 TABLET ORAL EVERY 6 HOURS PRN
Qty: 20 TABLET | Refills: 0 | Status: SHIPPED | OUTPATIENT
Start: 2018-05-29 | End: 2018-06-20 | Stop reason: SDUPTHER

## 2018-05-29 RX ORDER — 0.9 % SODIUM CHLORIDE 0.9 %
1000 INTRAVENOUS SOLUTION INTRAVENOUS ONCE
Status: COMPLETED | OUTPATIENT
Start: 2018-05-29 | End: 2018-05-29

## 2018-05-29 RX ADMIN — KETOROLAC TROMETHAMINE 30 MG: 30 INJECTION, SOLUTION INTRAMUSCULAR at 11:03

## 2018-05-29 RX ADMIN — DIPHENHYDRAMINE HYDROCHLORIDE 25 MG: 50 INJECTION, SOLUTION INTRAMUSCULAR; INTRAVENOUS at 11:18

## 2018-05-29 RX ADMIN — SODIUM CHLORIDE 1000 ML: 9 INJECTION, SOLUTION INTRAVENOUS at 11:03

## 2018-05-29 RX ADMIN — ONDANSETRON 4 MG: 2 INJECTION INTRAMUSCULAR; INTRAVENOUS at 11:17

## 2018-05-29 ASSESSMENT — PAIN DESCRIPTION - FREQUENCY: FREQUENCY: CONTINUOUS

## 2018-05-29 ASSESSMENT — ENCOUNTER SYMPTOMS
NAUSEA: 1
ABDOMINAL PAIN: 1
CONSTIPATION: 0
SHORTNESS OF BREATH: 1
ABDOMINAL DISTENTION: 0
VOMITING: 1
DIARRHEA: 0
BLOOD IN STOOL: 1
ROS SKIN COMMENTS: GENERALIZED ITCHING

## 2018-05-29 ASSESSMENT — PAIN SCALES - GENERAL
PAINLEVEL_OUTOF10: 8
PAINLEVEL_OUTOF10: 8

## 2018-05-29 ASSESSMENT — PAIN DESCRIPTION - ONSET: ONSET: ON-GOING

## 2018-05-29 ASSESSMENT — PAIN DESCRIPTION - DESCRIPTORS: DESCRIPTORS: ACHING;CONSTANT

## 2018-05-29 ASSESSMENT — PAIN DESCRIPTION - PAIN TYPE: TYPE: ACUTE PAIN

## 2018-05-29 ASSESSMENT — PAIN DESCRIPTION - ORIENTATION: ORIENTATION: UPPER;LEFT

## 2018-05-29 ASSESSMENT — PAIN DESCRIPTION - LOCATION: LOCATION: ABDOMEN

## 2018-06-04 ENCOUNTER — TELEPHONE (OUTPATIENT)
Dept: GASTROENTEROLOGY | Age: 28
End: 2018-06-04

## 2018-06-11 ENCOUNTER — TELEPHONE (OUTPATIENT)
Dept: GASTROENTEROLOGY | Age: 28
End: 2018-06-11

## 2018-06-11 ENCOUNTER — HOSPITAL ENCOUNTER (EMERGENCY)
Age: 28
Discharge: HOME | End: 2018-06-11
Attending: EMERGENCY MEDICINE
Payer: MEDICARE

## 2018-06-11 VITALS
SYSTOLIC BLOOD PRESSURE: 128 MMHG | HEART RATE: 73 BPM | DIASTOLIC BLOOD PRESSURE: 81 MMHG | RESPIRATION RATE: 14 BRPM | TEMPERATURE: 97 F | OXYGEN SATURATION: 99 %

## 2018-06-11 DIAGNOSIS — R07.9 CHEST PAIN, UNSPECIFIED TYPE: ICD-10-CM

## 2018-06-11 DIAGNOSIS — M79.604 RIGHT LEG PAIN: Primary | ICD-10-CM

## 2018-06-11 DIAGNOSIS — R51.9 NONINTRACTABLE HEADACHE, UNSPECIFIED CHRONICITY PATTERN, UNSPECIFIED HEADACHE TYPE: ICD-10-CM

## 2018-06-11 DIAGNOSIS — Z76.5 DRUG-SEEKING BEHAVIOR: ICD-10-CM

## 2018-06-11 DIAGNOSIS — R42 DIZZINESS: ICD-10-CM

## 2018-06-11 LAB
ABSOLUTE EOS #: 0.09 K/UL (ref 0–0.44)
ABSOLUTE IMMATURE GRANULOCYTE: <0.03 K/UL (ref 0–0.3)
ABSOLUTE LYMPH #: 2.51 K/UL (ref 1.1–3.7)
ABSOLUTE MONO #: 0.6 K/UL (ref 0.1–1.2)
ALBUMIN SERPL-MCNC: 3.8 G/DL (ref 3.5–5.2)
ALBUMIN/GLOBULIN RATIO: 1.1 (ref 1–2.5)
ALP BLD-CCNC: 64 U/L (ref 40–129)
ALT SERPL-CCNC: 16 U/L (ref 5–41)
ANION GAP SERPL CALCULATED.3IONS-SCNC: 9 MMOL/L (ref 9–17)
AST SERPL-CCNC: 25 U/L
BASOPHILS # BLD: 0 % (ref 0–2)
BASOPHILS ABSOLUTE: <0.03 K/UL (ref 0–0.2)
BILIRUB SERPL-MCNC: 0.21 MG/DL (ref 0.3–1.2)
BUN BLDV-MCNC: 9 MG/DL (ref 6–20)
BUN/CREAT BLD: ABNORMAL (ref 9–20)
CALCIUM SERPL-MCNC: 8.6 MG/DL (ref 8.6–10.4)
CHLORIDE BLD-SCNC: 105 MMOL/L (ref 98–107)
CO2: 28 MMOL/L (ref 20–31)
CREAT SERPL-MCNC: 0.8 MG/DL (ref 0.7–1.2)
DIFFERENTIAL TYPE: ABNORMAL
EKG ATRIAL RATE: 68 BPM
EKG P AXIS: 13 DEGREES
EKG P-R INTERVAL: 130 MS
EKG Q-T INTERVAL: 392 MS
EKG QRS DURATION: 72 MS
EKG QTC CALCULATION (BAZETT): 416 MS
EKG R AXIS: 40 DEGREES
EKG T AXIS: 20 DEGREES
EKG VENTRICULAR RATE: 68 BPM
EOSINOPHILS RELATIVE PERCENT: 2 % (ref 1–4)
GFR AFRICAN AMERICAN: >60 ML/MIN
GFR NON-AFRICAN AMERICAN: >60 ML/MIN
GFR SERPL CREATININE-BSD FRML MDRD: ABNORMAL ML/MIN/{1.73_M2}
GFR SERPL CREATININE-BSD FRML MDRD: ABNORMAL ML/MIN/{1.73_M2}
GLUCOSE BLD-MCNC: 82 MG/DL (ref 70–99)
HCT VFR BLD CALC: 37.5 % (ref 40.7–50.3)
HEMOGLOBIN: 12.1 G/DL (ref 13–17)
IMMATURE GRANULOCYTES: 0 %
LIPASE: 21 U/L (ref 13–60)
LYMPHOCYTES # BLD: 42 % (ref 24–43)
MCH RBC QN AUTO: 28.9 PG (ref 25.2–33.5)
MCHC RBC AUTO-ENTMCNC: 32.3 G/DL (ref 28.4–34.8)
MCV RBC AUTO: 89.5 FL (ref 82.6–102.9)
MONOCYTES # BLD: 10 % (ref 3–12)
NRBC AUTOMATED: 0 PER 100 WBC
PDW BLD-RTO: 13 % (ref 11.8–14.4)
PLATELET # BLD: 241 K/UL (ref 138–453)
PLATELET ESTIMATE: ABNORMAL
PMV BLD AUTO: 10 FL (ref 8.1–13.5)
POC TROPONIN I: 0.01 NG/ML (ref 0–0.1)
POC TROPONIN INTERP: NORMAL
POTASSIUM SERPL-SCNC: 4.1 MMOL/L (ref 3.7–5.3)
RBC # BLD: 4.19 M/UL (ref 4.21–5.77)
RBC # BLD: ABNORMAL 10*6/UL
SEG NEUTROPHILS: 46 % (ref 36–65)
SEGMENTED NEUTROPHILS ABSOLUTE COUNT: 2.74 K/UL (ref 1.5–8.1)
SODIUM BLD-SCNC: 142 MMOL/L (ref 135–144)
TOTAL PROTEIN: 7.2 G/DL (ref 6.4–8.3)
WBC # BLD: 6 K/UL (ref 3.5–11.3)
WBC # BLD: ABNORMAL 10*3/UL

## 2018-06-11 PROCEDURE — 93005 ELECTROCARDIOGRAM TRACING: CPT

## 2018-06-11 PROCEDURE — 80053 COMPREHEN METABOLIC PANEL: CPT

## 2018-06-11 PROCEDURE — 96372 THER/PROPH/DIAG INJ SC/IM: CPT

## 2018-06-11 PROCEDURE — 2580000003 HC RX 258: Performed by: EMERGENCY MEDICINE

## 2018-06-11 PROCEDURE — 85025 COMPLETE CBC W/AUTO DIFF WBC: CPT

## 2018-06-11 PROCEDURE — 99284 EMERGENCY DEPT VISIT MOD MDM: CPT

## 2018-06-11 PROCEDURE — 96374 THER/PROPH/DIAG INJ IV PUSH: CPT

## 2018-06-11 PROCEDURE — 84484 ASSAY OF TROPONIN QUANT: CPT

## 2018-06-11 PROCEDURE — 93970 EXTREMITY STUDY: CPT

## 2018-06-11 PROCEDURE — 83690 ASSAY OF LIPASE: CPT

## 2018-06-11 PROCEDURE — 6360000002 HC RX W HCPCS: Performed by: EMERGENCY MEDICINE

## 2018-06-11 RX ORDER — ONDANSETRON 4 MG/1
4 TABLET, ORALLY DISINTEGRATING ORAL ONCE
Status: DISCONTINUED | OUTPATIENT
Start: 2018-06-11 | End: 2018-06-11 | Stop reason: HOSPADM

## 2018-06-11 RX ORDER — 0.9 % SODIUM CHLORIDE 0.9 %
1000 INTRAVENOUS SOLUTION INTRAVENOUS ONCE
Status: COMPLETED | OUTPATIENT
Start: 2018-06-11 | End: 2018-06-11

## 2018-06-11 RX ORDER — DIPHENHYDRAMINE HYDROCHLORIDE 50 MG/ML
25 INJECTION INTRAMUSCULAR; INTRAVENOUS ONCE
Status: COMPLETED | OUTPATIENT
Start: 2018-06-11 | End: 2018-06-11

## 2018-06-11 RX ORDER — DIPHENHYDRAMINE HCL 25 MG
25 TABLET ORAL ONCE
Status: DISCONTINUED | OUTPATIENT
Start: 2018-06-11 | End: 2018-06-11 | Stop reason: HOSPADM

## 2018-06-11 RX ORDER — KETOROLAC TROMETHAMINE 15 MG/ML
15 INJECTION, SOLUTION INTRAMUSCULAR; INTRAVENOUS ONCE
Status: COMPLETED | OUTPATIENT
Start: 2018-06-11 | End: 2018-06-11

## 2018-06-11 RX ORDER — MECLIZINE HYDROCHLORIDE 25 MG/1
25 TABLET ORAL 3 TIMES DAILY PRN
Qty: 30 TABLET | Refills: 0 | Status: ON HOLD | OUTPATIENT
Start: 2018-06-11 | End: 2018-06-20

## 2018-06-11 RX ORDER — ACETAMINOPHEN 325 MG/1
650 TABLET ORAL ONCE
Status: DISCONTINUED | OUTPATIENT
Start: 2018-06-11 | End: 2018-06-11 | Stop reason: HOSPADM

## 2018-06-11 RX ADMIN — KETOROLAC TROMETHAMINE 15 MG: 15 INJECTION, SOLUTION INTRAMUSCULAR; INTRAVENOUS at 20:30

## 2018-06-11 RX ADMIN — SODIUM CHLORIDE 1000 ML: 9 INJECTION, SOLUTION INTRAVENOUS at 20:30

## 2018-06-11 RX ADMIN — DIPHENHYDRAMINE HYDROCHLORIDE 25 MG: 50 INJECTION, SOLUTION INTRAMUSCULAR; INTRAVENOUS at 21:41

## 2018-06-11 ASSESSMENT — PAIN DESCRIPTION - ORIENTATION: ORIENTATION: RIGHT

## 2018-06-11 ASSESSMENT — PAIN SCALES - GENERAL: PAINLEVEL_OUTOF10: 10

## 2018-06-11 ASSESSMENT — PAIN DESCRIPTION - DESCRIPTORS: DESCRIPTORS: ACHING;DISCOMFORT

## 2018-06-11 ASSESSMENT — PAIN DESCRIPTION - PAIN TYPE: TYPE: ACUTE PAIN

## 2018-06-13 ENCOUNTER — HOSPITAL ENCOUNTER (OUTPATIENT)
Age: 28
Setting detail: OUTPATIENT SURGERY
Discharge: HOME | End: 2018-06-13
Attending: INTERNAL MEDICINE | Admitting: INTERNAL MEDICINE
Payer: MEDICARE

## 2018-06-13 ENCOUNTER — ANESTHESIA (OUTPATIENT)
Dept: OPERATING ROOM | Age: 28
End: 2018-06-13
Payer: MEDICARE

## 2018-06-13 ENCOUNTER — ANESTHESIA EVENT (OUTPATIENT)
Dept: OPERATING ROOM | Age: 28
End: 2018-06-13
Payer: MEDICARE

## 2018-06-13 VITALS
OXYGEN SATURATION: 98 % | HEIGHT: 69 IN | SYSTOLIC BLOOD PRESSURE: 116 MMHG | HEART RATE: 63 BPM | DIASTOLIC BLOOD PRESSURE: 58 MMHG | RESPIRATION RATE: 16 BRPM | TEMPERATURE: 98.2 F | BODY MASS INDEX: 32.58 KG/M2 | WEIGHT: 220 LBS

## 2018-06-13 VITALS — DIASTOLIC BLOOD PRESSURE: 55 MMHG | OXYGEN SATURATION: 95 % | SYSTOLIC BLOOD PRESSURE: 115 MMHG

## 2018-06-13 PROCEDURE — 2580000003 HC RX 258: Performed by: INTERNAL MEDICINE

## 2018-06-13 PROCEDURE — 88342 IMHCHEM/IMCYTCHM 1ST ANTB: CPT

## 2018-06-13 PROCEDURE — 3700000000 HC ANESTHESIA ATTENDED CARE: Performed by: INTERNAL MEDICINE

## 2018-06-13 PROCEDURE — 2700000000 HC OXYGEN THERAPY PER DAY

## 2018-06-13 PROCEDURE — 7100000000 HC PACU RECOVERY - FIRST 15 MIN: Performed by: INTERNAL MEDICINE

## 2018-06-13 PROCEDURE — 6360000002 HC RX W HCPCS: Performed by: ANESTHESIOLOGY

## 2018-06-13 PROCEDURE — 7100000030 HC ASPR PHASE II RECOVERY - FIRST 15 MIN: Performed by: INTERNAL MEDICINE

## 2018-06-13 PROCEDURE — 43239 EGD BIOPSY SINGLE/MULTIPLE: CPT | Performed by: INTERNAL MEDICINE

## 2018-06-13 PROCEDURE — 7100000001 HC PACU RECOVERY - ADDTL 15 MIN: Performed by: INTERNAL MEDICINE

## 2018-06-13 PROCEDURE — 3609012400 HC EGD TRANSORAL BIOPSY SINGLE/MULTIPLE: Performed by: INTERNAL MEDICINE

## 2018-06-13 PROCEDURE — 88305 TISSUE EXAM BY PATHOLOGIST: CPT

## 2018-06-13 PROCEDURE — 3700000001 HC ADD 15 MINUTES (ANESTHESIA): Performed by: INTERNAL MEDICINE

## 2018-06-13 PROCEDURE — 7100000031 HC ASPR PHASE II RECOVERY - ADDTL 15 MIN: Performed by: INTERNAL MEDICINE

## 2018-06-13 RX ORDER — PROPOFOL 10 MG/ML
INJECTION, EMULSION INTRAVENOUS PRN
Status: DISCONTINUED | OUTPATIENT
Start: 2018-06-13 | End: 2018-06-13 | Stop reason: SDUPTHER

## 2018-06-13 RX ORDER — DEXAMETHASONE SODIUM PHOSPHATE 4 MG/ML
INJECTION, SOLUTION INTRA-ARTICULAR; INTRALESIONAL; INTRAMUSCULAR; INTRAVENOUS; SOFT TISSUE PRN
Status: DISCONTINUED | OUTPATIENT
Start: 2018-06-13 | End: 2018-06-13 | Stop reason: SDUPTHER

## 2018-06-13 RX ORDER — PROMETHAZINE HYDROCHLORIDE 25 MG/1
50 TABLET ORAL 2 TIMES DAILY
Status: ON HOLD | COMMUNITY
End: 2018-06-26 | Stop reason: HOSPADM

## 2018-06-13 RX ORDER — 0.9 % SODIUM CHLORIDE 0.9 %
500 INTRAVENOUS SOLUTION INTRAVENOUS
Status: DISCONTINUED | OUTPATIENT
Start: 2018-06-13 | End: 2018-06-13 | Stop reason: HOSPADM

## 2018-06-13 RX ORDER — FENTANYL CITRATE 50 UG/ML
25 INJECTION, SOLUTION INTRAMUSCULAR; INTRAVENOUS EVERY 5 MIN PRN
Status: DISCONTINUED | OUTPATIENT
Start: 2018-06-13 | End: 2018-06-13 | Stop reason: HOSPADM

## 2018-06-13 RX ORDER — HYDROCODONE BITARTRATE AND ACETAMINOPHEN 5; 325 MG/1; MG/1
1 TABLET ORAL PRN
Status: DISCONTINUED | OUTPATIENT
Start: 2018-06-13 | End: 2018-06-13 | Stop reason: HOSPADM

## 2018-06-13 RX ORDER — HYDRALAZINE HYDROCHLORIDE 20 MG/ML
5 INJECTION INTRAMUSCULAR; INTRAVENOUS EVERY 10 MIN PRN
Status: DISCONTINUED | OUTPATIENT
Start: 2018-06-13 | End: 2018-06-13 | Stop reason: HOSPADM

## 2018-06-13 RX ORDER — MIDAZOLAM HYDROCHLORIDE 1 MG/ML
INJECTION INTRAMUSCULAR; INTRAVENOUS PRN
Status: DISCONTINUED | OUTPATIENT
Start: 2018-06-13 | End: 2018-06-13 | Stop reason: SDUPTHER

## 2018-06-13 RX ORDER — SODIUM CHLORIDE, SODIUM LACTATE, POTASSIUM CHLORIDE, CALCIUM CHLORIDE 600; 310; 30; 20 MG/100ML; MG/100ML; MG/100ML; MG/100ML
INJECTION, SOLUTION INTRAVENOUS CONTINUOUS
Status: DISCONTINUED | OUTPATIENT
Start: 2018-06-13 | End: 2018-06-13 | Stop reason: HOSPADM

## 2018-06-13 RX ORDER — HYDROCODONE BITARTRATE AND ACETAMINOPHEN 5; 325 MG/1; MG/1
2 TABLET ORAL PRN
Status: DISCONTINUED | OUTPATIENT
Start: 2018-06-13 | End: 2018-06-13 | Stop reason: HOSPADM

## 2018-06-13 RX ORDER — PREDNISONE 10 MG/1
10 TABLET ORAL DAILY
Status: ON HOLD | COMMUNITY
End: 2018-06-20

## 2018-06-13 RX ORDER — ONDANSETRON 2 MG/ML
INJECTION INTRAMUSCULAR; INTRAVENOUS PRN
Status: DISCONTINUED | OUTPATIENT
Start: 2018-06-13 | End: 2018-06-13 | Stop reason: SDUPTHER

## 2018-06-13 RX ORDER — DIPHENHYDRAMINE HYDROCHLORIDE 50 MG/ML
12.5 INJECTION INTRAMUSCULAR; INTRAVENOUS
Status: DISCONTINUED | OUTPATIENT
Start: 2018-06-13 | End: 2018-06-13 | Stop reason: HOSPADM

## 2018-06-13 RX ADMIN — ONDANSETRON 4 MG: 2 INJECTION INTRAMUSCULAR; INTRAVENOUS at 12:15

## 2018-06-13 RX ADMIN — PROPOFOL 20 MG: 10 INJECTION, EMULSION INTRAVENOUS at 12:22

## 2018-06-13 RX ADMIN — PROPOFOL 20 MG: 10 INJECTION, EMULSION INTRAVENOUS at 12:25

## 2018-06-13 RX ADMIN — SODIUM CHLORIDE, POTASSIUM CHLORIDE, SODIUM LACTATE AND CALCIUM CHLORIDE: 600; 310; 30; 20 INJECTION, SOLUTION INTRAVENOUS at 11:45

## 2018-06-13 RX ADMIN — PROPOFOL 30 MG: 10 INJECTION, EMULSION INTRAVENOUS at 12:19

## 2018-06-13 RX ADMIN — PROPOFOL 20 MG: 10 INJECTION, EMULSION INTRAVENOUS at 12:24

## 2018-06-13 RX ADMIN — DEXAMETHASONE SODIUM PHOSPHATE 4 MG: 4 INJECTION, SOLUTION INTRAMUSCULAR; INTRAVENOUS at 12:16

## 2018-06-13 RX ADMIN — MIDAZOLAM 2 MG: 1 INJECTION INTRAMUSCULAR; INTRAVENOUS at 12:20

## 2018-06-13 RX ADMIN — PROPOFOL 20 MG: 10 INJECTION, EMULSION INTRAVENOUS at 12:20

## 2018-06-13 RX ADMIN — PROPOFOL 20 MG: 10 INJECTION, EMULSION INTRAVENOUS at 12:23

## 2018-06-13 ASSESSMENT — PAIN SCALES - GENERAL
PAINLEVEL_OUTOF10: 0

## 2018-06-13 ASSESSMENT — PAIN DESCRIPTION - PAIN TYPE: TYPE: CHRONIC PAIN

## 2018-06-13 ASSESSMENT — PULMONARY FUNCTION TESTS
PIF_VALUE: 1
PIF_VALUE: 0
PIF_VALUE: 1

## 2018-06-13 ASSESSMENT — PAIN DESCRIPTION - DESCRIPTORS: DESCRIPTORS: ACHING;SHARP

## 2018-06-13 ASSESSMENT — PAIN - FUNCTIONAL ASSESSMENT: PAIN_FUNCTIONAL_ASSESSMENT: 0-10

## 2018-06-15 ENCOUNTER — APPOINTMENT (OUTPATIENT)
Dept: GENERAL RADIOLOGY | Age: 28
End: 2018-06-15
Payer: MEDICARE

## 2018-06-15 ENCOUNTER — HOSPITAL ENCOUNTER (EMERGENCY)
Age: 28
Discharge: HOME | End: 2018-06-15
Attending: EMERGENCY MEDICINE
Payer: MEDICARE

## 2018-06-15 VITALS
SYSTOLIC BLOOD PRESSURE: 138 MMHG | WEIGHT: 221.2 LBS | HEART RATE: 84 BPM | HEIGHT: 69 IN | DIASTOLIC BLOOD PRESSURE: 72 MMHG | BODY MASS INDEX: 32.76 KG/M2 | TEMPERATURE: 97.3 F | RESPIRATION RATE: 14 BRPM | OXYGEN SATURATION: 96 %

## 2018-06-15 DIAGNOSIS — R10.13 ABDOMINAL PAIN, EPIGASTRIC: ICD-10-CM

## 2018-06-15 DIAGNOSIS — R11.0 NAUSEA: Primary | ICD-10-CM

## 2018-06-15 DIAGNOSIS — L29.9 PRURITUS: ICD-10-CM

## 2018-06-15 LAB
ABSOLUTE EOS #: 0.1 K/UL (ref 0–0.4)
ABSOLUTE IMMATURE GRANULOCYTE: ABNORMAL K/UL (ref 0–0.3)
ABSOLUTE LYMPH #: 3.2 K/UL (ref 1–4.8)
ABSOLUTE MONO #: 0.5 K/UL (ref 0.2–0.8)
ALBUMIN SERPL-MCNC: 4 G/DL (ref 3.5–5.2)
ALBUMIN/GLOBULIN RATIO: ABNORMAL (ref 1–2.5)
ALP BLD-CCNC: 63 U/L (ref 40–129)
ALT SERPL-CCNC: 15 U/L (ref 5–41)
AMYLASE: 65 U/L (ref 28–100)
ANION GAP SERPL CALCULATED.3IONS-SCNC: 12 MMOL/L (ref 9–17)
AST SERPL-CCNC: 19 U/L
BASOPHILS # BLD: 1 % (ref 0–2)
BASOPHILS ABSOLUTE: 0 K/UL (ref 0–0.2)
BILIRUB SERPL-MCNC: 0.13 MG/DL (ref 0.3–1.2)
BILIRUBIN URINE: NEGATIVE
BUN BLDV-MCNC: 12 MG/DL (ref 6–20)
BUN/CREAT BLD: 13 (ref 9–20)
CALCIUM SERPL-MCNC: 8.9 MG/DL (ref 8.6–10.4)
CHLORIDE BLD-SCNC: 103 MMOL/L (ref 98–107)
CO2: 28 MMOL/L (ref 20–31)
COLOR: YELLOW
COMMENT UA: NORMAL
CREAT SERPL-MCNC: 0.94 MG/DL (ref 0.7–1.2)
DIFFERENTIAL TYPE: ABNORMAL
EOSINOPHILS RELATIVE PERCENT: 1 % (ref 1–4)
GFR AFRICAN AMERICAN: >60 ML/MIN
GFR NON-AFRICAN AMERICAN: >60 ML/MIN
GFR SERPL CREATININE-BSD FRML MDRD: ABNORMAL ML/MIN/{1.73_M2}
GFR SERPL CREATININE-BSD FRML MDRD: ABNORMAL ML/MIN/{1.73_M2}
GLUCOSE BLD-MCNC: 119 MG/DL (ref 70–99)
GLUCOSE URINE: NEGATIVE
HCT VFR BLD CALC: 37.8 % (ref 41–53)
HEMOGLOBIN: 12.4 G/DL (ref 13.5–17.5)
IMMATURE GRANULOCYTES: ABNORMAL %
KETONES, URINE: NEGATIVE
LEUKOCYTE ESTERASE, URINE: NEGATIVE
LIPASE: 31 U/L (ref 13–60)
LYMPHOCYTES # BLD: 45 % (ref 24–44)
MCH RBC QN AUTO: 29.7 PG (ref 26–34)
MCHC RBC AUTO-ENTMCNC: 32.9 G/DL (ref 31–37)
MCV RBC AUTO: 90.3 FL (ref 80–100)
MONOCYTES # BLD: 8 % (ref 1–7)
NITRITE, URINE: NEGATIVE
NRBC AUTOMATED: ABNORMAL PER 100 WBC
PDW BLD-RTO: 14.3 % (ref 11.5–14.5)
PH UA: 6 (ref 5–8)
PLATELET # BLD: 235 K/UL (ref 130–400)
PLATELET ESTIMATE: ABNORMAL
PMV BLD AUTO: 8.2 FL (ref 6–12)
POTASSIUM SERPL-SCNC: 3.6 MMOL/L (ref 3.7–5.3)
PROTEIN UA: NEGATIVE
RBC # BLD: 4.19 M/UL (ref 4.5–5.9)
RBC # BLD: ABNORMAL 10*6/UL
SEG NEUTROPHILS: 45 % (ref 36–66)
SEGMENTED NEUTROPHILS ABSOLUTE COUNT: 3.4 K/UL (ref 1.8–7.7)
SODIUM BLD-SCNC: 143 MMOL/L (ref 135–144)
SPECIFIC GRAVITY UA: 1.03 (ref 1–1.03)
SURGICAL PATHOLOGY REPORT: NORMAL
TOTAL PROTEIN: 7 G/DL (ref 6.4–8.3)
TURBIDITY: CLEAR
URINE HGB: NEGATIVE
UROBILINOGEN, URINE: NORMAL
WBC # BLD: 7.3 K/UL (ref 3.5–11)
WBC # BLD: ABNORMAL 10*3/UL

## 2018-06-15 PROCEDURE — 99284 EMERGENCY DEPT VISIT MOD MDM: CPT

## 2018-06-15 PROCEDURE — 85025 COMPLETE CBC W/AUTO DIFF WBC: CPT

## 2018-06-15 PROCEDURE — 87591 N.GONORRHOEAE DNA AMP PROB: CPT

## 2018-06-15 PROCEDURE — 6360000002 HC RX W HCPCS: Performed by: PHYSICIAN ASSISTANT

## 2018-06-15 PROCEDURE — 82150 ASSAY OF AMYLASE: CPT

## 2018-06-15 PROCEDURE — 81003 URINALYSIS AUTO W/O SCOPE: CPT

## 2018-06-15 PROCEDURE — 83690 ASSAY OF LIPASE: CPT

## 2018-06-15 PROCEDURE — 2580000003 HC RX 258: Performed by: EMERGENCY MEDICINE

## 2018-06-15 PROCEDURE — 2580000003 HC RX 258: Performed by: PHYSICIAN ASSISTANT

## 2018-06-15 PROCEDURE — 87491 CHLMYD TRACH DNA AMP PROBE: CPT

## 2018-06-15 PROCEDURE — 96375 TX/PRO/DX INJ NEW DRUG ADDON: CPT

## 2018-06-15 PROCEDURE — 80053 COMPREHEN METABOLIC PANEL: CPT

## 2018-06-15 PROCEDURE — 74022 RADEX COMPL AQT ABD SERIES: CPT

## 2018-06-15 PROCEDURE — 6360000002 HC RX W HCPCS: Performed by: EMERGENCY MEDICINE

## 2018-06-15 PROCEDURE — C9113 INJ PANTOPRAZOLE SODIUM, VIA: HCPCS | Performed by: EMERGENCY MEDICINE

## 2018-06-15 PROCEDURE — 96374 THER/PROPH/DIAG INJ IV PUSH: CPT

## 2018-06-15 RX ORDER — ONDANSETRON 2 MG/ML
4 INJECTION INTRAMUSCULAR; INTRAVENOUS ONCE
Status: COMPLETED | OUTPATIENT
Start: 2018-06-15 | End: 2018-06-15

## 2018-06-15 RX ORDER — HYDROXYZINE HYDROCHLORIDE 25 MG/1
25 TABLET, FILM COATED ORAL EVERY 6 HOURS PRN
Qty: 20 TABLET | Refills: 0 | Status: ON HOLD | OUTPATIENT
Start: 2018-06-15 | End: 2018-06-26 | Stop reason: HOSPADM

## 2018-06-15 RX ORDER — PANTOPRAZOLE SODIUM 20 MG/1
40 TABLET, DELAYED RELEASE ORAL DAILY
Qty: 30 TABLET | Refills: 0 | Status: ON HOLD | OUTPATIENT
Start: 2018-06-15 | End: 2018-06-26

## 2018-06-15 RX ORDER — POLYETHYLENE GLYCOL 3350 17 G/17G
17 POWDER, FOR SOLUTION ORAL DAILY
Qty: 1 BOTTLE | Refills: 0 | Status: ON HOLD | OUTPATIENT
Start: 2018-06-15 | End: 2018-06-20

## 2018-06-15 RX ORDER — DIPHENHYDRAMINE HYDROCHLORIDE 50 MG/ML
25 INJECTION INTRAMUSCULAR; INTRAVENOUS ONCE
Status: COMPLETED | OUTPATIENT
Start: 2018-06-15 | End: 2018-06-15

## 2018-06-15 RX ORDER — 0.9 % SODIUM CHLORIDE 0.9 %
10 VIAL (ML) INJECTION ONCE
Status: COMPLETED | OUTPATIENT
Start: 2018-06-15 | End: 2018-06-15

## 2018-06-15 RX ORDER — ONDANSETRON 4 MG/1
4 TABLET, ORALLY DISINTEGRATING ORAL EVERY 8 HOURS PRN
Qty: 20 TABLET | Refills: 0 | Status: SHIPPED | OUTPATIENT
Start: 2018-06-15 | End: 2018-06-20 | Stop reason: SDUPTHER

## 2018-06-15 RX ORDER — PANTOPRAZOLE SODIUM 40 MG/10ML
40 INJECTION, POWDER, LYOPHILIZED, FOR SOLUTION INTRAVENOUS ONCE
Status: COMPLETED | OUTPATIENT
Start: 2018-06-15 | End: 2018-06-15

## 2018-06-15 RX ORDER — SUCRALFATE 1 G/1
1 TABLET ORAL 4 TIMES DAILY
Qty: 40 TABLET | Refills: 0 | Status: ON HOLD | OUTPATIENT
Start: 2018-06-15 | End: 2018-06-26

## 2018-06-15 RX ORDER — 0.9 % SODIUM CHLORIDE 0.9 %
1000 INTRAVENOUS SOLUTION INTRAVENOUS ONCE
Status: COMPLETED | OUTPATIENT
Start: 2018-06-15 | End: 2018-06-15

## 2018-06-15 RX ADMIN — PANTOPRAZOLE SODIUM 40 MG: 40 INJECTION, POWDER, FOR SOLUTION INTRAVENOUS at 16:21

## 2018-06-15 RX ADMIN — SODIUM CHLORIDE 1000 ML: 9 INJECTION, SOLUTION INTRAVENOUS at 15:54

## 2018-06-15 RX ADMIN — SODIUM CHLORIDE 10 ML: 9 INJECTION, SOLUTION INTRAMUSCULAR; INTRAVENOUS; SUBCUTANEOUS at 16:22

## 2018-06-15 RX ADMIN — DIPHENHYDRAMINE HYDROCHLORIDE 25 MG: 50 INJECTION, SOLUTION INTRAMUSCULAR; INTRAVENOUS at 15:54

## 2018-06-15 RX ADMIN — ONDANSETRON 4 MG: 2 INJECTION INTRAMUSCULAR; INTRAVENOUS at 15:54

## 2018-06-15 ASSESSMENT — PAIN DESCRIPTION - PAIN TYPE: TYPE: CHRONIC PAIN

## 2018-06-15 ASSESSMENT — PAIN SCALES - GENERAL: PAINLEVEL_OUTOF10: 10

## 2018-06-15 ASSESSMENT — PATIENT HEALTH QUESTIONNAIRE - PHQ9: SUM OF ALL RESPONSES TO PHQ QUESTIONS 1-9: 11

## 2018-06-16 DIAGNOSIS — B96.81 HELICOBACTER PYLORI GASTRITIS: Primary | ICD-10-CM

## 2018-06-16 DIAGNOSIS — K29.70 HELICOBACTER PYLORI GASTRITIS: Primary | ICD-10-CM

## 2018-06-16 RX ORDER — METRONIDAZOLE 500 MG/1
500 TABLET ORAL 3 TIMES DAILY
Qty: 42 TABLET | Refills: 0 | Status: ON HOLD | OUTPATIENT
Start: 2018-06-16 | End: 2018-06-20

## 2018-06-16 RX ORDER — OMEPRAZOLE 40 MG/1
40 CAPSULE, DELAYED RELEASE ORAL DAILY
Qty: 14 CAPSULE | Refills: 0 | Status: ON HOLD | OUTPATIENT
Start: 2018-06-16 | End: 2018-06-20

## 2018-06-16 RX ORDER — AMOXICILLIN 500 MG/1
1000 CAPSULE ORAL 2 TIMES DAILY
Qty: 56 CAPSULE | Refills: 0 | Status: ON HOLD | OUTPATIENT
Start: 2018-06-16 | End: 2018-06-20

## 2018-06-18 DIAGNOSIS — K62.5 RECTAL BLEEDING: ICD-10-CM

## 2018-06-18 DIAGNOSIS — R10.84 GENERALIZED ABDOMINAL PAIN: ICD-10-CM

## 2018-06-18 DIAGNOSIS — R11.2 NAUSEA AND VOMITING, INTRACTABILITY OF VOMITING NOT SPECIFIED, UNSPECIFIED VOMITING TYPE: ICD-10-CM

## 2018-06-18 LAB
C. TRACHOMATIS DNA ,URINE: NEGATIVE
N. GONORRHOEAE DNA, URINE: NEGATIVE

## 2018-06-19 ENCOUNTER — HOSPITAL ENCOUNTER (EMERGENCY)
Age: 28
Discharge: HOME | End: 2018-06-20
Attending: EMERGENCY MEDICINE
Payer: MEDICARE

## 2018-06-19 DIAGNOSIS — R11.2 NAUSEA AND VOMITING, INTRACTABILITY OF VOMITING NOT SPECIFIED, UNSPECIFIED VOMITING TYPE: Primary | ICD-10-CM

## 2018-06-19 DIAGNOSIS — R10.9 ABDOMINAL PAIN, UNSPECIFIED ABDOMINAL LOCATION: ICD-10-CM

## 2018-06-19 LAB
-: ABNORMAL
ABSOLUTE EOS #: 0.1 K/UL (ref 0–0.4)
ABSOLUTE IMMATURE GRANULOCYTE: ABNORMAL K/UL (ref 0–0.3)
ABSOLUTE LYMPH #: 2.6 K/UL (ref 1–4.8)
ABSOLUTE MONO #: 0.5 K/UL (ref 0.1–1.3)
AMORPHOUS: ABNORMAL
AMPHETAMINE SCREEN URINE: NEGATIVE
BACTERIA: ABNORMAL
BARBITURATE SCREEN URINE: NEGATIVE
BASOPHILS # BLD: 0 % (ref 0–2)
BASOPHILS ABSOLUTE: 0 K/UL (ref 0–0.2)
BENZODIAZEPINE SCREEN, URINE: NEGATIVE
BILIRUBIN URINE: ABNORMAL
BUPRENORPHINE URINE: ABNORMAL
CANNABINOID SCREEN URINE: NEGATIVE
CASTS UA: ABNORMAL /LPF
COCAINE METABOLITE, URINE: NEGATIVE
COLOR: ABNORMAL
COMMENT UA: ABNORMAL
CRYSTALS, UA: ABNORMAL /HPF
DIFFERENTIAL TYPE: ABNORMAL
EOSINOPHILS RELATIVE PERCENT: 1 % (ref 0–4)
EPITHELIAL CELLS UA: ABNORMAL /HPF
GLUCOSE URINE: NEGATIVE
HCT VFR BLD CALC: 39.8 % (ref 41–53)
HEMOGLOBIN: 13.2 G/DL (ref 13.5–17.5)
IMMATURE GRANULOCYTES: ABNORMAL %
KETONES, URINE: ABNORMAL
LEUKOCYTE ESTERASE, URINE: NEGATIVE
LYMPHOCYTES # BLD: 42 % (ref 24–44)
MCH RBC QN AUTO: 29.1 PG (ref 26–34)
MCHC RBC AUTO-ENTMCNC: 33.1 G/DL (ref 31–37)
MCV RBC AUTO: 88 FL (ref 80–100)
MDMA URINE: ABNORMAL
METHADONE SCREEN, URINE: POSITIVE
METHAMPHETAMINE, URINE: ABNORMAL
MONOCYTES # BLD: 8 % (ref 1–7)
MUCUS: ABNORMAL
NITRITE, URINE: NEGATIVE
NRBC AUTOMATED: ABNORMAL PER 100 WBC
OPIATES, URINE: NEGATIVE
OTHER OBSERVATIONS UA: ABNORMAL
OXYCODONE SCREEN URINE: NEGATIVE
PDW BLD-RTO: 13.7 % (ref 11.5–14.9)
PH UA: 5.5 (ref 5–8)
PHENCYCLIDINE, URINE: NEGATIVE
PLATELET # BLD: 229 K/UL (ref 150–450)
PLATELET ESTIMATE: ABNORMAL
PMV BLD AUTO: 8.2 FL (ref 6–12)
PROPOXYPHENE, URINE: ABNORMAL
PROTEIN UA: NEGATIVE
RBC # BLD: 4.52 M/UL (ref 4.5–5.9)
RBC # BLD: ABNORMAL 10*6/UL
RBC UA: ABNORMAL /HPF
RENAL EPITHELIAL, UA: ABNORMAL /HPF
SEG NEUTROPHILS: 49 % (ref 36–66)
SEGMENTED NEUTROPHILS ABSOLUTE COUNT: 3 K/UL (ref 1.3–9.1)
SPECIFIC GRAVITY UA: 1.03 (ref 1–1.03)
TEST INFORMATION: ABNORMAL
TRICHOMONAS: ABNORMAL
TRICYCLIC ANTIDEPRESSANTS, UR: ABNORMAL
TURBIDITY: CLEAR
URINE HGB: NEGATIVE
UROBILINOGEN, URINE: NORMAL
WBC # BLD: 6.2 K/UL (ref 3.5–11)
WBC # BLD: ABNORMAL 10*3/UL
WBC UA: ABNORMAL /HPF
YEAST: ABNORMAL

## 2018-06-19 PROCEDURE — 81001 URINALYSIS AUTO W/SCOPE: CPT

## 2018-06-19 PROCEDURE — 96374 THER/PROPH/DIAG INJ IV PUSH: CPT

## 2018-06-19 PROCEDURE — 2580000003 HC RX 258: Performed by: EMERGENCY MEDICINE

## 2018-06-19 PROCEDURE — 36415 COLL VENOUS BLD VENIPUNCTURE: CPT

## 2018-06-19 PROCEDURE — 80048 BASIC METABOLIC PNL TOTAL CA: CPT

## 2018-06-19 PROCEDURE — 80076 HEPATIC FUNCTION PANEL: CPT

## 2018-06-19 PROCEDURE — 96375 TX/PRO/DX INJ NEW DRUG ADDON: CPT

## 2018-06-19 PROCEDURE — 80307 DRUG TEST PRSMV CHEM ANLYZR: CPT

## 2018-06-19 PROCEDURE — 99284 EMERGENCY DEPT VISIT MOD MDM: CPT

## 2018-06-19 PROCEDURE — 83880 ASSAY OF NATRIURETIC PEPTIDE: CPT

## 2018-06-19 PROCEDURE — 6360000002 HC RX W HCPCS: Performed by: EMERGENCY MEDICINE

## 2018-06-19 PROCEDURE — 84484 ASSAY OF TROPONIN QUANT: CPT

## 2018-06-19 PROCEDURE — 85025 COMPLETE CBC W/AUTO DIFF WBC: CPT

## 2018-06-19 RX ORDER — PROMETHAZINE HYDROCHLORIDE 25 MG/ML
25 INJECTION, SOLUTION INTRAMUSCULAR; INTRAVENOUS ONCE
Status: COMPLETED | OUTPATIENT
Start: 2018-06-19 | End: 2018-06-19

## 2018-06-19 RX ORDER — DIPHENHYDRAMINE HYDROCHLORIDE 50 MG/ML
25 INJECTION INTRAMUSCULAR; INTRAVENOUS ONCE
Status: COMPLETED | OUTPATIENT
Start: 2018-06-19 | End: 2018-06-19

## 2018-06-19 RX ORDER — 0.9 % SODIUM CHLORIDE 0.9 %
1000 INTRAVENOUS SOLUTION INTRAVENOUS ONCE
Status: COMPLETED | OUTPATIENT
Start: 2018-06-19 | End: 2018-06-20

## 2018-06-19 RX ADMIN — DIPHENHYDRAMINE HYDROCHLORIDE 25 MG: 50 INJECTION, SOLUTION INTRAMUSCULAR; INTRAVENOUS at 23:47

## 2018-06-19 RX ADMIN — SODIUM CHLORIDE 1000 ML: 9 INJECTION, SOLUTION INTRAVENOUS at 23:47

## 2018-06-19 RX ADMIN — PROMETHAZINE HYDROCHLORIDE 25 MG: 25 INJECTION INTRAMUSCULAR; INTRAVENOUS at 23:47

## 2018-06-19 ASSESSMENT — PAIN DESCRIPTION - PAIN TYPE: TYPE: ACUTE PAIN

## 2018-06-19 ASSESSMENT — PAIN DESCRIPTION - LOCATION: LOCATION: ABDOMEN

## 2018-06-19 ASSESSMENT — PAIN SCALES - GENERAL: PAINLEVEL_OUTOF10: 7

## 2018-06-20 ENCOUNTER — HOSPITAL ENCOUNTER (INPATIENT)
Age: 28
LOS: 6 days | Discharge: HOME | DRG: 885 | End: 2018-06-26
Attending: PSYCHIATRY & NEUROLOGY | Admitting: PSYCHIATRY & NEUROLOGY
Payer: MEDICARE

## 2018-06-20 VITALS
HEIGHT: 69 IN | HEART RATE: 98 BPM | RESPIRATION RATE: 16 BRPM | SYSTOLIC BLOOD PRESSURE: 123 MMHG | WEIGHT: 220 LBS | DIASTOLIC BLOOD PRESSURE: 63 MMHG | OXYGEN SATURATION: 92 % | BODY MASS INDEX: 32.58 KG/M2 | TEMPERATURE: 98.1 F

## 2018-06-20 DIAGNOSIS — M08.00 JUVENILE RHEUMATOID ARTHRITIS (HCC): ICD-10-CM

## 2018-06-20 PROBLEM — F11.20 OPIOID TYPE DEPENDENCE, CONTINUOUS (HCC): Status: ACTIVE | Noted: 2018-06-20

## 2018-06-20 PROBLEM — F31.9 BIPOLAR 1 DISORDER (HCC): Status: ACTIVE | Noted: 2018-06-20

## 2018-06-20 PROBLEM — F11.20 OPIOID TYPE DEPENDENCE, CONTINUOUS (HCC): Chronic | Status: ACTIVE | Noted: 2018-06-20

## 2018-06-20 PROBLEM — F31.9 BIPOLAR 1 DISORDER (HCC): Chronic | Status: ACTIVE | Noted: 2018-06-20

## 2018-06-20 LAB
ALBUMIN SERPL-MCNC: 4 G/DL (ref 3.5–5.2)
ALBUMIN/GLOBULIN RATIO: ABNORMAL (ref 1–2.5)
ALP BLD-CCNC: 66 U/L (ref 40–129)
ALT SERPL-CCNC: 16 U/L (ref 5–41)
ANION GAP SERPL CALCULATED.3IONS-SCNC: 9 MMOL/L (ref 9–17)
AST SERPL-CCNC: 27 U/L
BILIRUB SERPL-MCNC: 0.25 MG/DL (ref 0.3–1.2)
BILIRUBIN DIRECT: 0.08 MG/DL
BILIRUBIN, INDIRECT: 0.17 MG/DL (ref 0–1)
BNP INTERPRETATION: NORMAL
BUN BLDV-MCNC: 10 MG/DL (ref 6–20)
BUN/CREAT BLD: ABNORMAL (ref 9–20)
CALCIUM SERPL-MCNC: 9 MG/DL (ref 8.6–10.4)
CHLORIDE BLD-SCNC: 102 MMOL/L (ref 98–107)
CO2: 29 MMOL/L (ref 20–31)
CREAT SERPL-MCNC: 0.91 MG/DL (ref 0.7–1.2)
GFR AFRICAN AMERICAN: >60 ML/MIN
GFR NON-AFRICAN AMERICAN: >60 ML/MIN
GFR SERPL CREATININE-BSD FRML MDRD: ABNORMAL ML/MIN/{1.73_M2}
GFR SERPL CREATININE-BSD FRML MDRD: ABNORMAL ML/MIN/{1.73_M2}
GLOBULIN: ABNORMAL G/DL (ref 1.5–3.8)
GLUCOSE BLD-MCNC: 100 MG/DL (ref 70–99)
POTASSIUM SERPL-SCNC: 3.9 MMOL/L (ref 3.7–5.3)
PRO-BNP: <20 PG/ML
SODIUM BLD-SCNC: 140 MMOL/L (ref 135–144)
TOTAL PROTEIN: 7.1 G/DL (ref 6.4–8.3)
TROPONIN INTERP: NORMAL
TROPONIN T: <0.03 NG/ML

## 2018-06-20 PROCEDURE — 6370000000 HC RX 637 (ALT 250 FOR IP): Performed by: PSYCHIATRY & NEUROLOGY

## 2018-06-20 PROCEDURE — 1240000000 HC EMOTIONAL WELLNESS R&B

## 2018-06-20 RX ORDER — MAGNESIUM HYDROXIDE/ALUMINUM HYDROXICE/SIMETHICONE 120; 1200; 1200 MG/30ML; MG/30ML; MG/30ML
30 SUSPENSION ORAL 3 TIMES DAILY PRN
Status: DISCONTINUED | OUTPATIENT
Start: 2018-06-20 | End: 2018-06-26 | Stop reason: HOSPADM

## 2018-06-20 RX ORDER — ONDANSETRON 4 MG/1
8 TABLET, ORALLY DISINTEGRATING ORAL EVERY 8 HOURS PRN
Qty: 30 TABLET | Refills: 0 | Status: ON HOLD | OUTPATIENT
Start: 2018-06-20 | End: 2018-06-28

## 2018-06-20 RX ORDER — HYDROXYZINE HYDROCHLORIDE 25 MG/1
25 TABLET, FILM COATED ORAL 3 TIMES DAILY PRN
Status: DISCONTINUED | OUTPATIENT
Start: 2018-06-20 | End: 2018-06-26 | Stop reason: HOSPADM

## 2018-06-20 RX ORDER — QUETIAPINE FUMARATE 200 MG/1
400 TABLET, FILM COATED ORAL NIGHTLY
Status: DISCONTINUED | OUTPATIENT
Start: 2018-06-20 | End: 2018-06-26 | Stop reason: HOSPADM

## 2018-06-20 RX ORDER — ACETAMINOPHEN 325 MG/1
650 TABLET ORAL EVERY 6 HOURS PRN
Qty: 40 TABLET | Refills: 0 | Status: ON HOLD | OUTPATIENT
Start: 2018-06-20 | End: 2018-06-20

## 2018-06-20 RX ORDER — GABAPENTIN 100 MG/1
100 CAPSULE ORAL 3 TIMES DAILY
Status: DISCONTINUED | OUTPATIENT
Start: 2018-06-20 | End: 2018-06-26 | Stop reason: HOSPADM

## 2018-06-20 RX ORDER — ALBUTEROL SULFATE 90 UG/1
2 AEROSOL, METERED RESPIRATORY (INHALATION) EVERY 6 HOURS PRN
Status: DISCONTINUED | OUTPATIENT
Start: 2018-06-20 | End: 2018-06-21

## 2018-06-20 RX ORDER — METHADONE HYDROCHLORIDE 10 MG/5ML
94 SOLUTION ORAL DAILY
Status: DISCONTINUED | OUTPATIENT
Start: 2018-06-21 | End: 2018-06-26 | Stop reason: HOSPADM

## 2018-06-20 RX ORDER — NICOTINE 21 MG/24HR
1 PATCH, TRANSDERMAL 24 HOURS TRANSDERMAL DAILY
Status: DISCONTINUED | OUTPATIENT
Start: 2018-06-20 | End: 2018-06-26 | Stop reason: HOSPADM

## 2018-06-20 RX ORDER — MELOXICAM 7.5 MG/1
15 TABLET ORAL DAILY
Status: DISCONTINUED | OUTPATIENT
Start: 2018-06-20 | End: 2018-06-21

## 2018-06-20 RX ORDER — DIPHENHYDRAMINE HCL 25 MG
25 TABLET ORAL NIGHTLY PRN
Status: DISCONTINUED | OUTPATIENT
Start: 2018-06-20 | End: 2018-06-26 | Stop reason: HOSPADM

## 2018-06-20 RX ORDER — LOPERAMIDE HYDROCHLORIDE 2 MG/1
2 CAPSULE ORAL 2 TIMES DAILY PRN
Status: DISCONTINUED | OUTPATIENT
Start: 2018-06-20 | End: 2018-06-26 | Stop reason: HOSPADM

## 2018-06-20 RX ORDER — TRAZODONE HYDROCHLORIDE 50 MG/1
50 TABLET ORAL NIGHTLY PRN
Status: DISCONTINUED | OUTPATIENT
Start: 2018-06-20 | End: 2018-06-26 | Stop reason: HOSPADM

## 2018-06-20 RX ORDER — OXCARBAZEPINE 300 MG/1
300 TABLET, FILM COATED ORAL 2 TIMES DAILY
Status: DISCONTINUED | OUTPATIENT
Start: 2018-06-20 | End: 2018-06-21

## 2018-06-20 RX ORDER — ACETAMINOPHEN 500 MG
500 TABLET ORAL EVERY 4 HOURS PRN
Status: DISCONTINUED | OUTPATIENT
Start: 2018-06-20 | End: 2018-06-26 | Stop reason: HOSPADM

## 2018-06-20 RX ORDER — METHADONE HYDROCHLORIDE 10 MG/5ML
100 SOLUTION ORAL DAILY
Status: DISCONTINUED | OUTPATIENT
Start: 2018-06-20 | End: 2018-06-20

## 2018-06-20 ASSESSMENT — PAIN SCALES - GENERAL: PAINLEVEL_OUTOF10: 6

## 2018-06-20 ASSESSMENT — PAIN DESCRIPTION - PAIN TYPE: TYPE: CHRONIC PAIN;ACUTE PAIN

## 2018-06-20 ASSESSMENT — SLEEP AND FATIGUE QUESTIONNAIRES
DIFFICULTY ARISING: YES
RESTFUL SLEEP: NO
DIFFICULTY FALLING ASLEEP: YES
DO YOU USE A SLEEP AID: YES
SLEEP PATTERN: DIFFICULTY FALLING ASLEEP;RESTLESSNESS;INSOMNIA
DO YOU HAVE DIFFICULTY SLEEPING: YES
DIFFICULTY STAYING ASLEEP: YES
AVERAGE NUMBER OF SLEEP HOURS: 2

## 2018-06-20 ASSESSMENT — PATIENT HEALTH QUESTIONNAIRE - PHQ9: SUM OF ALL RESPONSES TO PHQ QUESTIONS 1-9: 27

## 2018-06-20 ASSESSMENT — PAIN DESCRIPTION - LOCATION: LOCATION: ANKLE

## 2018-06-20 ASSESSMENT — PAIN DESCRIPTION - DESCRIPTORS: DESCRIPTORS: ACHING

## 2018-06-20 ASSESSMENT — PAIN DESCRIPTION - ORIENTATION: ORIENTATION: RIGHT

## 2018-06-20 ASSESSMENT — LIFESTYLE VARIABLES: HISTORY_ALCOHOL_USE: NO

## 2018-06-20 NOTE — CARE COORDINATION
BHI Biopsychosocial Assessment    Current Level of Psychosocial Functioning     Independent   Dependent X   Minimal Assist     Comments:  PT reports that prior to admission he was living in an apartment in [facility] and reports that he plans on returning to the apartment following discharge. PT reports SSDI income. PT presents with past history of Opiate abuse. Psychosocial High Risk Factors (check all that apply)    Unable to obtain meds   Chronic illness/pain    Substance abuse X  Lack of Family Support X  Financial stress   Isolation X  Inadequate Community Resources  Suicide attempt(s)  Not taking medications X   Victim of crime   Developmental Delay  Unable to manage personal needs    Age 72 or older   Homeless  No transportation   Readmission within 30 days  Unemployment  Traumatic Event    Psychiatric Advanced Directives: none reported     Family to Involve in Treatment: lack of family support     Sexual Orientation:  RANDAL    Patient Strengths: adequate housing, linked with outpatient treatment at [facility], [facility]    Patient Barriers: presenting on admission with suicidal ideation with a plan to overdose on medications, history of Opoid dependence disorder, off of medications, lack of family support. Opiate Education Provided: PT presents with a hx of Opoid dependence disorder, PT is currently on Methadone treatment for the Medication Assisted Program at [facility]. PT was provided with Opiate addiction and relapse information. [facility] health history: PT is linked with the [address] [place] [address] and [facility]. Plan of Care   medication management, group/individual therapies, family meetings, psycho -education, treatment team meetings to assist with stabilization, referral to community resources. Initial Discharge Plan:  PT reports a plan to return to his apartment following discharge.  He also reports a plan to continue to follow up with treatment at [facility]

## 2018-06-21 PROCEDURE — 6360000002 HC RX W HCPCS: Performed by: PSYCHIATRY & NEUROLOGY

## 2018-06-21 PROCEDURE — 6370000000 HC RX 637 (ALT 250 FOR IP): Performed by: INTERNAL MEDICINE

## 2018-06-21 PROCEDURE — 6370000000 HC RX 637 (ALT 250 FOR IP): Performed by: PSYCHIATRY & NEUROLOGY

## 2018-06-21 PROCEDURE — 1240000000 HC EMOTIONAL WELLNESS R&B

## 2018-06-21 PROCEDURE — 99223 1ST HOSP IP/OBS HIGH 75: CPT | Performed by: INTERNAL MEDICINE

## 2018-06-21 RX ORDER — PROMETHAZINE HYDROCHLORIDE 12.5 MG/1
25 TABLET ORAL 2 TIMES DAILY PRN
Status: DISCONTINUED | OUTPATIENT
Start: 2018-06-21 | End: 2018-06-26 | Stop reason: HOSPADM

## 2018-06-21 RX ORDER — PREDNISONE 10 MG/1
5 TABLET ORAL DAILY
Status: DISCONTINUED | OUTPATIENT
Start: 2018-06-21 | End: 2018-06-26 | Stop reason: HOSPADM

## 2018-06-21 RX ORDER — DULOXETIN HYDROCHLORIDE 30 MG/1
30 CAPSULE, DELAYED RELEASE ORAL DAILY
Status: DISCONTINUED | OUTPATIENT
Start: 2018-06-21 | End: 2018-06-26 | Stop reason: HOSPADM

## 2018-06-21 RX ORDER — DOXYCYCLINE 100 MG/1
100 CAPSULE ORAL EVERY 12 HOURS SCHEDULED
Status: DISCONTINUED | OUTPATIENT
Start: 2018-06-21 | End: 2018-06-26 | Stop reason: HOSPADM

## 2018-06-21 RX ORDER — ALBUTEROL SULFATE 90 UG/1
2 AEROSOL, METERED RESPIRATORY (INHALATION) EVERY 6 HOURS PRN
Status: DISCONTINUED | OUTPATIENT
Start: 2018-06-21 | End: 2018-06-26 | Stop reason: HOSPADM

## 2018-06-21 RX ORDER — PANTOPRAZOLE SODIUM 40 MG/1
40 TABLET, DELAYED RELEASE ORAL
Status: DISCONTINUED | OUTPATIENT
Start: 2018-06-22 | End: 2018-06-22

## 2018-06-21 RX ADMIN — ACETAMINOPHEN 500 MG: 500 TABLET, FILM COATED ORAL at 10:07

## 2018-06-21 RX ADMIN — ACETAMINOPHEN 500 MG: 500 TABLET, FILM COATED ORAL at 22:58

## 2018-06-21 RX ADMIN — GABAPENTIN 100 MG: 100 CAPSULE ORAL at 22:57

## 2018-06-21 RX ADMIN — GABAPENTIN 100 MG: 100 CAPSULE ORAL at 10:08

## 2018-06-21 RX ADMIN — PREDNISONE 5 MG: 5 TABLET ORAL at 14:25

## 2018-06-21 RX ADMIN — PROMETHAZINE HYDROCHLORIDE 25 MG: 12.5 TABLET ORAL at 23:00

## 2018-06-21 RX ADMIN — DOXYCYCLINE 100 MG: 100 CAPSULE ORAL at 22:58

## 2018-06-21 RX ADMIN — QUETIAPINE FUMARATE 400 MG: 200 TABLET ORAL at 22:58

## 2018-06-21 RX ADMIN — GABAPENTIN 100 MG: 100 CAPSULE ORAL at 14:24

## 2018-06-21 RX ADMIN — Medication 94 MG: at 09:05

## 2018-06-21 RX ADMIN — PROMETHAZINE HYDROCHLORIDE 25 MG: 12.5 TABLET ORAL at 10:07

## 2018-06-21 RX ADMIN — DIPHENHYDRAMINE HCL 25 MG: 25 TABLET ORAL at 22:58

## 2018-06-21 ASSESSMENT — PAIN SCALES - GENERAL
PAINLEVEL_OUTOF10: 2
PAINLEVEL_OUTOF10: 0
PAINLEVEL_OUTOF10: 5
PAINLEVEL_OUTOF10: 1
PAINLEVEL_OUTOF10: 6
PAINLEVEL_OUTOF10: 2

## 2018-06-21 ASSESSMENT — PAIN DESCRIPTION - ORIENTATION: ORIENTATION: LOWER

## 2018-06-21 ASSESSMENT — PAIN DESCRIPTION - DESCRIPTORS: DESCRIPTORS: ACHING;DISCOMFORT

## 2018-06-21 ASSESSMENT — PAIN DESCRIPTION - PAIN TYPE: TYPE: ACUTE PAIN

## 2018-06-21 ASSESSMENT — PAIN DESCRIPTION - FREQUENCY: FREQUENCY: INTERMITTENT

## 2018-06-21 ASSESSMENT — PAIN DESCRIPTION - LOCATION: LOCATION: BACK

## 2018-06-21 NOTE — PLAN OF CARE
Problem: Altered Mood, Depressive Behavior:  Goal: Able to verbalize and/or display a decrease in depressive symptoms  Able to verbalize and/or display a decrease in depressive symptoms   Outcome: Ongoing  Psychoeducation Group Note    Date: 6/21/18  Start Time: 1000  End Time: 1040    Number Participants in Group:  12    Goal:  Patient will demonstrate increased interpersonal interaction.    Topic:  Therapeutic Leisure Skills Group    Discipline Responsible:   OT  AT  [facility]. X RT  Other       Participation Level:     None  Minimal   X Active Listener X Interactive    Monopolizing         Participation Quality:  X Appropriate  Inappropriate   X       Attentive        Intrusive   X       Sharing        Resistant          Supportive        Lethargic       Affective:   X Congruent  Incongruent  Blunted  Flat    Constricted  Anxious  Elated  Angry    Labile  Depressed  Other  Bright       Cognitive:  X Alert X Oriented PPTP     Concentration X G  F  P   Attention Span X G  F  P   Short-Term Memory X G  F  P   Long-Term Memory X G  F  P   ProblemSolving/  Decision Making X G  F  P   Ability to Process  Information X G  F  P      Contributing Factors             Delusional             Hallucinating             Flight of Ideas             Other:       Modes of Intervention:  X Education  Support X Exploration    Clarifying X Problem Solving  Confrontation   X Socialization X Limit Setting  Reality Testing   X Activity  Movement  Media    Other:            Response to Learning:  X Able to verbalize current knowledge/experience   X Able to verbalize/acknowledge new learning   X Able to retain information   X Capable of insight    Able to change behavior   X Progressing to goal    Other:        Comments:

## 2018-06-21 NOTE — PLAN OF CARE
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[facility]     Substance abuse     Suicidal ideation     Suicide attempt by hanging ([facility]     Tobacco dependence     Ulcerative colitis ([facility]     UTI (urinary tract infection)        Risk:  Fall RiskTotal: 86  Ross Scale Ross Scale Score: 22  BVC Total: 1  Change in scores:  No Changes to plan of Care:  No    Status EXAM:   Status and Exam  Normal: No  Facial Expression: Hostile  Affect: Blunt  Level of Consciousness: Alert  Mood:Normal: No  Mood: Depressed, Anxious, Suspicious, Irritable, Labile  Motor Activity:Normal: Yes  Motor Activity: Decreased  Interview Behavior: Evasive, Impulsive, Irritable  Preception: [place] to Person, [place] to Time, [place] to Place, [place] to Situation  Attention:Normal: No  Attention: Distractible  Thought Processes: Blocking  Thought Content:Normal: No  Thought Content: Preoccupations, Paranoia  Hallucinations: None  Delusions: Yes  Delusions: Persecution  Memory:Normal: No  Memory: Poor Recent  Insight and Judgment: No  Insight and Judgment: Poor Judgment, Poor Insight, Unmotivated, Unrealistic  Present Suicidal Ideation: No  Present Homicidal Ideation: No    Daily Assessment Last Entry:   Daily Sleep (WDL): Within Defined Limits         Patient Currently in Pain: No  Daily Nutrition (WDL): Within Defined Limits    Patient Monitoring:  Frequency of Checks: 4 times per hour, close    Psychiatric Symptoms:   Depression Symptoms  Depression Symptoms: Increased irritability, Impaired concentration, Isolative  Anxiety Symptoms  Anxiety Symptoms: Generalized  Elizabeth Symptoms  Elizabeth Symptoms: Poor judgment, Pressured speech     Psychosis Symptoms  Delusion Type: Paranoid    Suicide Risk CSSR-S:  1) Within the past month, have you wished you were dead or wished you could go to sleep and not wake up? : YES  2) Within the past month, have you actually had any thoughts of killing yourself? : YES  3) Within the past month, have you been thinking about how you might kill yourself? : NO  5) Within the past month, have you started to work out or worked out the details of how to kill yourself? Do you intend to carry out this plan? : NO  6)  Have you ever done anything, started to do anything, or prepared to do anything to end your life?: NO  Change in Result:  No Change in Plan of care:  No      EDUCATION:   Learner Progress Toward Treatment Goals:   Reviewed results and recommendations of this team, Reviewed group plan and strategies, Reviewed signs, symptoms and risk of self harm and violent behavior, Reviewed goals and plan of care    Method:  small group, individual verbal education    Outcome:   Verbalized by patient but needs reinforcement to obtain goals    PATIENT GOALS:  Short term:  Manage stress. \"Try to get a peace of mind\". Having clear thoughts. Medication compliance. Have discharge plan in place. Long term:   Continue to take medications. Follow up with [facility]. Maintain stability. Obtain stable affordable housing.      PLAN/TREATMENT RECOMMENDATIONS UPDATE:  continue with group therapies, increased socialization, continue planning for after discharge goals, continue with medication compliance    SHORT-TERM GOALS UPDATE:   Time frame for Short-Term Goals:  5-7 days    LONG-TERM GOALS UPDATE:   Time frame for Long-Term Goals:  6 months    Members Present in Team Meeting:   See signature sheet  DIANE Barry  Goal: Able to verbalize and/or display a decrease in depressive symptoms  Able to verbalize and/or display a decrease in depressive symptoms   Outcome: Ongoing    Goal: Ability to disclose and discuss suicidal ideas will improve  Ability to disclose and discuss suicidal ideas will improve   Outcome: Ongoing

## 2018-06-21 NOTE — PLAN OF CARE
Problem: Altered Mood, Depressive Behavior:  Goal: Able to verbalize and/or display a decrease in depressive symptoms  Able to verbalize and/or display a decrease in depressive symptoms   Outcome: Ongoing  Psychoeducation Group Note    Date: 6/21/2018  Start Time: 1330  End Time: 1415    Number Participants in Group:  12    Goal:  Patient will demonstrate increased interpersonal interaction. Topic:   Social + Communication Skills / Relating to Others / Personal Opinions    Discipline Responsible:   OT  AT  [facility]. X RT  Other       Participation Level:     None x Minimal    Active Listener  Interactive    Monopolizing         Participation Quality:  x Appropriate  Inappropriate          Attentive        Intrusive   x       Sharing        Resistant          Supportive x       Lethargic       Affective:    Congruent x Incongruent  Blunted  Flat    Constricted x Anxious  Elated  Angry    Labile  Depressed  Other  Bright       Cognitive:  x Alert x Oriented PPTP     Concentration  G x F  P   Attention Span  G x F  P   Short-Term Memory x G  F  P   Long-Term Memory  G  F  P   ProblemSolving/  Decision Making  G x F  P   Ability to Process  Information  G x F  P      Contributing Factors             Delusional             Hallucinating             Flight of Ideas             Other:       Modes of Intervention:  x Education x Support x Exploration   x Clarifying x Problem Solving x Confrontation   x Socialization x Limit Setting x Reality Testing   x Activity  Movement  Media    Other:            Response to Learning:  x Able to verbalize current knowledge/experience   x Able to verbalize/acknowledge new learning   x Able to retain information   x Capable of insight    Able to change behavior   x Progressing to goal    Other:        Comments:  Patient had trouble staying awake during group and dozed off several times. Patient states he was tired from a medication he was given.  Patient became anxious / uncomfortable

## 2018-06-21 NOTE — CONSULTS
paternal cousin; Depression in his brother and father; Diabetes in his brother and father; High Blood Pressure in his father; Mental Illness in his brother; Migraines in his brother, father, mother, and sister; Other in his brother, brother, father, mother, and sister; Stroke in an other family member. REVIEW OF SYSTEMS:    · Constitutional: Negative for weight loss  · Eyes: Negative for visual changes, diplopia, scleral icterus. · ENT: Negative for Headaches, hearing loss, vertigo, mouth sores, sore throat. · Cardiovascular: Negative for lightheadedness/orthostatic symptoms ,chest pain, dyspnea on exertion, palpitations or loss of consciousness. · Respiratory: Negative for cough or wheezing, sputum production, hemoptysis, pleuritic pain. · Gastrointestinal: Negative for nausea/vomiting, change in bowel habits, pos  abdominal pain, dysphagia/appetite loss, hematemesis, blood in stools. · Genitourinary:Negative for change in bladder habits, dysuria, trouble voiding, hematuria. · Musculoskeletal: Negative for gait disturbance, weaknesspos , joint complaints. · Integumentary: pos  for rash, pruritis. · Neurological: Negative for headache, dizziness, change in muscle strength, numbness/tingling, change in gait, balance, coordination,   · Endocrine: negative for temperature intolerance, excessive thirst, fluid intake, or urination, tremor. · Hematologic/Lymphatic: negative for abnormal bruising or bleeding, blood clots, swollen lymph nodes. · Allergic/Immunologic: negative for nasal congestion, pruritis, hives. PHYSICAL EXAM:    /75   Pulse 69   Temp 98.1 °F (36.7 °C) (Oral)   Resp 14   Ht 5' 9\" (1.753 m)   Wt 220 lb (99.8 kg)   BMI 32.49 kg/m²      · General appearance: well nourished  · HEENT: Head: Normocephalic, no lesions, without obvious abnormality.   · Lungs: clear to auscultation bilaterally  · Heart: regular rate and rhythm, S1, S2 normal, no murmur, click, rub or Imaging/Diagonstics:    Xr Acute Abd Series Chest 1 Vw    Result Date: 6/15/2018  EXAMINATION: ACUTE ABDOMINAL SERIES WITH SINGLE  VIEW OF THE CHEST 6/15/2018 4:20 pm COMPARISON: Abdominal radiograph May 11, 2018. Acute abdominal series May 9, 2018. HISTORY: ORDERING SYSTEM PROVIDED HISTORY: Pain TECHNOLOGIST PROVIDED HISTORY: Reason for exam:->Pain Reason for exam:->epigastric abdominal pain Ordering Physician Provided Reason for Exam: upper abdomen pain Acuity: Acute Type of Exam: Initial Relevant Medical/Surgical History: EGD FINDINGS: Lungs are clear without acute process. No pleural effusion or pneumothorax. No focal consolidation or edema. Cardiomediastinal silhouette and bony thorax are without acute abnormality. No evidence of intraperitoneal free air. Moderate stool burden. Nonspecific bowel gas pattern without evidence of obstruction. No abnormal calcifications. Osseous structures are intact. No acute process in the chest. Moderate stool burden. No bowel obstruction or free air. Vl Dup Lower Extremity Venous Bilateral    Result Date: 6/11/2018    [facility]  Vascular Lower Extremities DVT Study Procedure   Patient Name      Ariel Gama      Date of Study             06/11/2018                    Alyssa Sommer   Date of Birth     1990   Gender                    Male   Age               32 year(s)   Race                      Black   Room Number       29   Corporate ID #    [id number]   Patient Acct #    [de-identified]   MR #              [id number]      [facility]   Accession #       [id number]    Interpreting Physician    Mony Ca   Referring Nurse                Referring Physician       Gary Page  Practitioner  Procedure Type of Study:   Veins: Lower Extremities DVT Study, Venous Scan Lower Bilateral.  Indications for Study:Pain, leg. Patient Status:ER. Technical Quality:Limited visualization. Limitation reason:patient tense during compressions. Conclusions   Summary   No evidence of superficial or deep venous thrombosis in both lower  extremities. Signature   ----------------------------------------------------------------  Electronically signed by Griselda Pence) on  06/11/2018 07:57 PM  ----------------------------------------------------------------   ----------------------------------------------------------------  Electronically signed by Haylie PandeyInterpreting physician)  on 06/11/2018 11:49 PM  ----------------------------------------------------------------  Findings:   Right Impression:                    Left Impression:  The common femoral, femoral,         The common femoral, femoral,  popliteal and tibial veins           popliteal and tibial veins  demonstrate normal compressibility   demonstrate normal compressibility  and augmentation. and augmentation. Normal compressibility of the great  Normal compressibility of the great  saphenous vein. saphenous vein. Normal compressibility of the small  Normal compressibility of the small  saphenous vein. saphenous vein. Risk Factors History +---------------------------------------+----------+-----------------------+ ! Diagnosis                              ! Date      ! Comments               ! +---------------------------------------+----------+-----------------------+ ! Previous Scan                          !02/27/2016!                       ! +---------------------------------------+----------+-----------------------+ ! Previous Scan                          !04/03/2016! WNL--limited calves    ! +---------------------------------------+----------+-----------------------+ ! Chronic lung disease->COPD             !          !                       ! +---------------------------------------+----------+-----------------------+ Velocities are measured in cm/s ; Diameters are measured in cm Right Lower Extremities DVT Study +------------------------------------+----------+---------------+----------+ ! GSV Ankle                           ! Yes       ! Yes            ! None      ! +------------------------------------+----------+---------------+----------+ ! SSV                                 ! Yes       ! Yes            ! None      ! +------------------------------------+----------+---------------+----------+ Right Doppler Measurements +---------------------------+------+------+--------------------------------+ ! Location                   ! Signal!Reflux! Reflux (msec)                   ! +---------------------------+------+------+--------------------------------+ ! Common Femoral             !Phasic!      !                                ! +---------------------------+------+------+--------------------------------+ ! Prox Femoral               !Phasic!      !                                ! +---------------------------+------+------+--------------------------------+ ! Popliteal                  !Phasic!      !                                ! +---------------------------+------+------+--------------------------------+ Left Lower Extremities DVT Study Measurements Left 2D Measurements +------------------------------------+----------+---------------+----------+ ! Location                            ! Visualized! Compressibility! Thrombosis! +------------------------------------+----------+---------------+----------+ ! Common Femoral                      !Yes       ! Yes            ! None      ! +------------------------------------+----------+---------------+----------+ ! Prox Femoral                        !Yes       ! Yes            ! None      ! +------------------------------------+----------+---------------+----------+ ! Mid Femoral                         !Yes       ! Yes            ! None      ! +------------------------------------+----------+---------------+----------+ ! Dist Femoral                        !Yes       ! Yes            ! None      !

## 2018-06-21 NOTE — FLOWSHEET NOTE
*Patient participated in the [facility]       06/21/18 1532   Encounter Summary   Services provided to: Patient   Referral/Consult From: Rounding   Continue Visiting (6/21/18)   Complexity of Encounter Low   Length of Encounter 30 minutes   Spiritual Assessment Completed Yes   Spiritual/Denominational   Type Spiritual support   Assessment Calm   Intervention Active listening   Outcome Receptive

## 2018-06-22 LAB
HCT VFR BLD CALC: 42.1 % (ref 41–53)
HEMOGLOBIN: 14 G/DL (ref 13.5–17.5)

## 2018-06-22 PROCEDURE — 99222 1ST HOSP IP/OBS MODERATE 55: CPT | Performed by: INTERNAL MEDICINE

## 2018-06-22 PROCEDURE — 1240000000 HC EMOTIONAL WELLNESS R&B

## 2018-06-22 PROCEDURE — 6370000000 HC RX 637 (ALT 250 FOR IP): Performed by: PSYCHIATRY & NEUROLOGY

## 2018-06-22 PROCEDURE — 36415 COLL VENOUS BLD VENIPUNCTURE: CPT

## 2018-06-22 PROCEDURE — 6370000000 HC RX 637 (ALT 250 FOR IP): Performed by: INTERNAL MEDICINE

## 2018-06-22 PROCEDURE — 85018 HEMOGLOBIN: CPT

## 2018-06-22 PROCEDURE — 85014 HEMATOCRIT: CPT

## 2018-06-22 PROCEDURE — 6370000000 HC RX 637 (ALT 250 FOR IP): Performed by: NURSE PRACTITIONER

## 2018-06-22 PROCEDURE — 6360000002 HC RX W HCPCS: Performed by: PSYCHIATRY & NEUROLOGY

## 2018-06-22 PROCEDURE — 99231 SBSQ HOSP IP/OBS SF/LOW 25: CPT | Performed by: INTERNAL MEDICINE

## 2018-06-22 RX ORDER — METHADONE HYDROCHLORIDE 10 MG/5ML
34 SOLUTION ORAL ONCE
Status: DISCONTINUED | OUTPATIENT
Start: 2018-06-22 | End: 2018-06-22 | Stop reason: SDUPTHER

## 2018-06-22 RX ORDER — METHADONE HYDROCHLORIDE 10 MG/5ML
34 SOLUTION ORAL ONCE
Status: COMPLETED | OUTPATIENT
Start: 2018-06-22 | End: 2018-06-22

## 2018-06-22 RX ORDER — SUCRALFATE 1 G/1
1 TABLET ORAL EVERY 6 HOURS SCHEDULED
Status: DISCONTINUED | OUTPATIENT
Start: 2018-06-22 | End: 2018-06-26 | Stop reason: HOSPADM

## 2018-06-22 RX ORDER — PANTOPRAZOLE SODIUM 40 MG/1
40 TABLET, DELAYED RELEASE ORAL
Status: DISCONTINUED | OUTPATIENT
Start: 2018-06-22 | End: 2018-06-26 | Stop reason: HOSPADM

## 2018-06-22 RX ADMIN — PANTOPRAZOLE SODIUM 40 MG: 40 TABLET, DELAYED RELEASE ORAL at 15:53

## 2018-06-22 RX ADMIN — DOXYCYCLINE 100 MG: 100 CAPSULE ORAL at 08:35

## 2018-06-22 RX ADMIN — GABAPENTIN 100 MG: 100 CAPSULE ORAL at 23:08

## 2018-06-22 RX ADMIN — GABAPENTIN 100 MG: 100 CAPSULE ORAL at 08:36

## 2018-06-22 RX ADMIN — MAGNESIUM HYDROXIDE 10 ML: 400 SUSPENSION ORAL at 23:08

## 2018-06-22 RX ADMIN — DIPHENHYDRAMINE HCL 25 MG: 25 TABLET ORAL at 23:08

## 2018-06-22 RX ADMIN — Medication 60 MG: at 08:50

## 2018-06-22 RX ADMIN — SUCRALFATE 1 G: 1 TABLET ORAL at 17:38

## 2018-06-22 RX ADMIN — DULOXETINE HYDROCHLORIDE 30 MG: 30 CAPSULE, DELAYED RELEASE ORAL at 09:44

## 2018-06-22 RX ADMIN — PROMETHAZINE HYDROCHLORIDE 25 MG: 12.5 TABLET ORAL at 09:06

## 2018-06-22 RX ADMIN — DOXYCYCLINE 100 MG: 100 CAPSULE ORAL at 23:07

## 2018-06-22 RX ADMIN — QUETIAPINE FUMARATE 400 MG: 200 TABLET ORAL at 23:07

## 2018-06-22 RX ADMIN — PREDNISONE 5 MG: 5 TABLET ORAL at 08:38

## 2018-06-22 RX ADMIN — SUCRALFATE 1 G: 1 TABLET ORAL at 12:45

## 2018-06-22 RX ADMIN — METHADONE HYDROCHLORIDE 34 MG: 10 SOLUTION ORAL at 09:31

## 2018-06-22 RX ADMIN — GABAPENTIN 100 MG: 100 CAPSULE ORAL at 15:53

## 2018-06-22 RX ADMIN — ACETAMINOPHEN 500 MG: 500 TABLET, FILM COATED ORAL at 09:07

## 2018-06-22 RX ADMIN — ACETAMINOPHEN 500 MG: 500 TABLET, FILM COATED ORAL at 23:08

## 2018-06-22 RX ADMIN — PROMETHAZINE HYDROCHLORIDE 25 MG: 12.5 TABLET ORAL at 23:08

## 2018-06-22 ASSESSMENT — PAIN SCALES - GENERAL
PAINLEVEL_OUTOF10: 3
PAINLEVEL_OUTOF10: 7
PAINLEVEL_OUTOF10: 3
PAINLEVEL_OUTOF10: 3

## 2018-06-22 NOTE — PLAN OF CARE
Problem: Altered Mood, Depressive Behavior:  Goal: Able to verbalize acceptance of life and situations over which he or she has no control  Able to verbalize acceptance of life and situations over which he or she has no control   Outcome: Ongoing  Patient voices feeling hopeless and helpless. Patient appears anxious and paranoid. Goal: Able to verbalize and/or display a decrease in depressive symptoms  Able to verbalize and/or display a decrease in depressive symptoms   Outcome: Ongoing  Patient reports feeling depressed. Patient is mostly isolative to room. Patient out for needs and attended part of group therapy. Patient is medication compliant. Patient interacts minimally with peers. Patient maintains behavioral control. Patient denies hallucinations. Patient reports non-specific homicidal thoughts. Will continue to monitor. Goal: Ability to disclose and discuss suicidal ideas will improve  Ability to disclose and discuss suicidal ideas will improve   Outcome: Ongoing  Patient admits to fleeting suicidal thoughts, although reports no plan. Patient contracts for safety. 15 minute checks maintained for patient safety. Will continue to monitor and provide support and reassurance as needed.

## 2018-06-22 NOTE — CONSULTS
06/13/2018    GERD (gastroesophageal reflux disease)     GI bleed     H. 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[facility]     Substance abuse     Suicidal ideation     Suicide attempt by hanging ([facility]     Tobacco dependence     Ulcerative colitis ([facility] [facility]     UTI (urinary tract infection)      Past Surgical History:   Procedure Laterality Date    ABDOMEN SURGERY      upper GI scope 7/7/2015    BRONCHOSCOPY      CHOLECYSTECTOMY, LAPAROSCOPIC  07/14/2017    surgery performed at [address]      lumbar puncture    [place] COLONOSCOPY W/BIOPSY SINGLE/MULTIPLE  8/9/2017    COLONOSCOPY WITH BIOPSY performed by Kamaljit Celaya MD at [facility]    [place] EGD TRANSORAL BIOPSY SINGLE/MULTIPLE N/A 3/20/2017    EGD BIOPSY performed by Marcia Franco MD at [facility]    [place] ESOPHAGOGASTRODUODENOSCOPY TRANSORAL DIAGNOSTIC N/A 8/9/2017    EGD ESOPHAGOGASTRODUODENOSCOPY performed by Kamaljit Celaya MD at [address] N/A 3/20/2017    SIGMOIDOSCOPY DIAGNOSTIC FLEXIBLE performed by Marcia Franco MD at [facility]    UPPER GASTROINTESTINAL ENDOSCOPY  2/4/16    UPPER GASTROINTESTINAL ENDOSCOPY N/A 6/13/2018    GASTRITIS       ALLERGIES:  Allergies   Allergen Reactions    Geodon [Ziprasidone Hcl] Anaphylaxis    Haldol [Haloperidol] Anaphylaxis    Iv Dye [Iodides] Nausea And Vomiting     Needs benadryl.  Dicyclomine     Famotidine        HOME MEDICATIONS:  Prior to Admission medications    Medication Sig Start Date End Date Taking?  Authorizing Provider   ondansetron (ZOFRAN ODT) 4 MG disintegrating tablet Take 2 tablets by mouth every 8 hours as

## 2018-06-22 NOTE — PLAN OF CARE
Problem: Altered Mood, Depressive Behavior:  Goal: Able to verbalize acceptance of life and situations over which he or she has no control  Able to verbalize acceptance of life and situations over which he or she has no control   Outcome: Ongoing  Patient is calm, cooperative and medication compliant. Patient denies suicidal ideations but reports feelings of depression and anxiety. Patient appears paranoid and is concerned about \"people going into his room\"; patient is offered reassurance. Patient is still suspicious at times, reports sleeping on and off and is eating adequately and attends select groups. Patient has safety checks Q15min and at irregular intervals; patient safety is maintained.   Goal: Able to verbalize and/or display a decrease in depressive symptoms  Able to verbalize and/or display a decrease in depressive symptoms   Outcome: Ongoing    Goal: Ability to disclose and discuss suicidal ideas will improve  Ability to disclose and discuss suicidal ideas will improve   Outcome: Ongoing

## 2018-06-22 NOTE — PLAN OF CARE
Problem: Altered Mood, Depressive Behavior:  Goal: Able to verbalize and/or display a decrease in depressive symptoms  Able to verbalize and/or display a decrease in depressive symptoms   Outcome: Ongoing  Pt did not participate in Leisure Skills and Awareness / Social Skills / Problem Solving group at 1430 despite staff encouragement.

## 2018-06-22 NOTE — PROGRESS NOTES
[address]                Date:   6/22/2018  Patient name:  Brock Bland  Date of admission:  6/20/2018  9:49 AM  MRN:   [id number]  YOB: 1990    Pt seen at the request of Frida Holland MD    CHIEF COMPLAINT:     History Obtained From:  Patient and chart review. HISTORY OF PRESENT ILLNESS:      The patient is a 32 y.o.  male who is admitted to the hospital for abd pain  Chr  Months   has leonard seeing gi     jacob was on bactrim    folliculitis  lw swelling related to ra       rash better   pt agitated  Past Medical History:   has a past medical history of Anxiety; Arthritis; Asthma; Bipolar I disorder, most recent episode (or current) depressed, unspecified; Clostridium difficile infection; COPD (chronic obstructive pulmonary disease) ([address] Depression; Disease of blood and blood forming organ; Eczema; Fracture, metacarpal; Gastric ulcer; Gastritis; GERD (gastroesophageal reflux disease); GI bleed; H. pylori infection; H/O blood clots; Head injury; Headache; Insomnia; Juvenile rheumatoid arthritis ([address] Neuromuscular disorder ([address] PFAPA syndrome ([address] PUD (peptic ulcer disease); Rheumatoid arthritis ([address] Rheumatoid arthritis(714.0); Sleep apnea; 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[address] Substance abuse; Suicidal ideation; Suicide attempt by hanging ([address] Tobacco dependence; Ulcerative colitis ([address] and UTI (urinary tract infection). Past Surgical History:   has a past surgical history that includes Colonoscopy; bronchoscopy; other surgical history; Upper gastrointestinal endoscopy (2/4/16); pr egd transoral biopsy single/multiple (N/A, 3/20/2017); sigmoidoscopy (N/A, 3/20/2017); Cholecystectomy, laparoscopic (07/14/2017); pr esophagogastroduodenoscopy transoral diagnostic (N/A, 8/9/2017); pr colonoscopy w/biopsy single/multiple (8/9/2017); Abdomen surgery; and Upper gastrointestinal endoscopy (N/A, 6/13/2018).      Home 0.59 03/28/2018        Imaging/Diagonstics:    Xr Acute Abd Series Chest 1 Vw    Result Date: 6/15/2018  EXAMINATION: ACUTE ABDOMINAL SERIES WITH SINGLE  VIEW OF THE CHEST 6/15/2018 4:20 pm COMPARISON: Abdominal radiograph May 11, 2018. Acute abdominal series May 9, 2018. HISTORY: ORDERING SYSTEM PROVIDED HISTORY: Pain TECHNOLOGIST PROVIDED HISTORY: Reason for exam:->Pain Reason for exam:->epigastric abdominal pain Ordering Physician Provided Reason for Exam: upper abdomen pain Acuity: Acute Type of Exam: Initial Relevant Medical/Surgical History: EGD FINDINGS: Lungs are clear without acute process. No pleural effusion or pneumothorax. No focal consolidation or edema. Cardiomediastinal silhouette and bony thorax are without acute abnormality. No evidence of intraperitoneal free air. Moderate stool burden. Nonspecific bowel gas pattern without evidence of obstruction. No abnormal calcifications. Osseous structures are intact. No acute process in the chest. Moderate stool burden. No bowel obstruction or free air. Vl Dup Lower Extremity Venous Bilateral    Result Date: 6/11/2018    [facility]  Vascular Lower Extremities DVT Study Procedure   Patient Name      Rosemary Steele      Date of Study             06/11/2018                    Cristiane Santos   Date of Birth     1990   Gender                    Male   Age               32 year(s)   Race                      Black   Room Number       29   Corporate ID #    [id number]   Patient Acct #    [de-identified]   MR #              [id number]      Jocelynn Durán   Accession #       [id number]    Interpreting Physician    Kim Pérez   Referring Nurse                Referring Physician       Radames Moore  Practitioner  Procedure Type of Study:   Veins: Lower Extremities DVT Study, Venous Scan Lower Bilateral.  Indications for Study:Pain, leg. Patient Status:ER. Technical Quality:Limited visualization.  Limitation reason:patient tense during ! +---------------------------+------+------+--------------------------------+ ! Popliteal                  !Phasic!      !                                ! +---------------------------+------+------+--------------------------------+        ASSESSMENT:    Patient Active Problem List   Diagnosis    Opioid abuse    Noncompliance    Rectal bleeding    Smoker    [facility] rheumatoid arthritis [facility] [facility]    SRIRAM (obstructive sleep apnea)    Primary insomnia    Calculus of bile duct with acute on chronic cholecystitis    Mild intermittent asthma without complication    Recurrent major depressive disorder (HCC)    Gastritis    Generalized abdominal pain    Anemia    Elevated liver enzymes    Nausea and vomiting    Bipolar 1 disorder (HCC)    Opioid type dependence, continuous (HCC)    Epigastric pain     ra on steroids / nsaids     Folliculitis    chr abd pain    has had egd  Showing gastritis  To get colo  Ct  2017  Nothing acute     ? Adhesions   h/o collitis   lft nl     le sweling   appears chr related to joint inflammation      PLAN:     gi to see     doxy    ppi         Rash beter   abd pain    gi to see   still v psychotic   MD ARBEN Khan [address], [address] [place] [address].    Phone [phone]   Fax: [phone]  Answering Service: [phone]

## 2018-06-23 PROCEDURE — 6370000000 HC RX 637 (ALT 250 FOR IP): Performed by: PSYCHIATRY & NEUROLOGY

## 2018-06-23 PROCEDURE — 99231 SBSQ HOSP IP/OBS SF/LOW 25: CPT | Performed by: INTERNAL MEDICINE

## 2018-06-23 PROCEDURE — 6360000002 HC RX W HCPCS: Performed by: PSYCHIATRY & NEUROLOGY

## 2018-06-23 PROCEDURE — 6370000000 HC RX 637 (ALT 250 FOR IP): Performed by: NURSE PRACTITIONER

## 2018-06-23 PROCEDURE — 1240000000 HC EMOTIONAL WELLNESS R&B

## 2018-06-23 PROCEDURE — 6370000000 HC RX 637 (ALT 250 FOR IP): Performed by: INTERNAL MEDICINE

## 2018-06-23 RX ADMIN — SUCRALFATE 1 G: 1 TABLET ORAL at 17:59

## 2018-06-23 RX ADMIN — PANTOPRAZOLE SODIUM 40 MG: 40 TABLET, DELAYED RELEASE ORAL at 09:02

## 2018-06-23 RX ADMIN — QUETIAPINE FUMARATE 400 MG: 200 TABLET ORAL at 23:06

## 2018-06-23 RX ADMIN — GABAPENTIN 100 MG: 100 CAPSULE ORAL at 23:05

## 2018-06-23 RX ADMIN — DIPHENHYDRAMINE HCL 25 MG: 25 TABLET ORAL at 23:05

## 2018-06-23 RX ADMIN — GABAPENTIN 100 MG: 100 CAPSULE ORAL at 09:02

## 2018-06-23 RX ADMIN — ACETAMINOPHEN 500 MG: 500 TABLET, FILM COATED ORAL at 23:05

## 2018-06-23 RX ADMIN — GABAPENTIN 100 MG: 100 CAPSULE ORAL at 14:53

## 2018-06-23 RX ADMIN — Medication 94 MG: at 09:07

## 2018-06-23 RX ADMIN — ACETAMINOPHEN 500 MG: 500 TABLET, FILM COATED ORAL at 04:20

## 2018-06-23 RX ADMIN — PROMETHAZINE HYDROCHLORIDE 25 MG: 12.5 TABLET ORAL at 23:05

## 2018-06-23 RX ADMIN — MAGNESIUM HYDROXIDE 10 ML: 400 SUSPENSION ORAL at 23:06

## 2018-06-23 RX ADMIN — PANTOPRAZOLE SODIUM 40 MG: 40 TABLET, DELAYED RELEASE ORAL at 17:08

## 2018-06-23 RX ADMIN — DOXYCYCLINE 100 MG: 100 CAPSULE ORAL at 09:01

## 2018-06-23 RX ADMIN — DOXYCYCLINE 100 MG: 100 CAPSULE ORAL at 23:05

## 2018-06-23 RX ADMIN — PROMETHAZINE HYDROCHLORIDE 25 MG: 12.5 TABLET ORAL at 09:30

## 2018-06-23 ASSESSMENT — ENCOUNTER SYMPTOMS
DIARRHEA: 0
SHORTNESS OF BREATH: 0
NAUSEA: 1
BLOOD IN STOOL: 0
SORE THROAT: 0
VOMITING: 0
HEARTBURN: 1
COUGH: 0
ABDOMINAL PAIN: 1
CONSTIPATION: 0

## 2018-06-23 ASSESSMENT — PAIN SCALES - GENERAL
PAINLEVEL_OUTOF10: 0
PAINLEVEL_OUTOF10: 0
PAINLEVEL_OUTOF10: 6
PAINLEVEL_OUTOF10: 6
PAINLEVEL_OUTOF10: 0
PAINLEVEL_OUTOF10: 0

## 2018-06-23 NOTE — PLAN OF CARE
Problem: Altered Mood, Depressive Behavior:  Goal: Able to verbalize and/or display a decrease in depressive symptoms  Psychoeducation Group Note    Date: 6/23/18  Start Time: 0900  End Time: 0930    Number Participants in Group:  8/22    Goal:  Patient will demonstrate increased interpersonal interaction   Topic: GOAL SETTING    Discipline Responsible:   OT  AT  [facility]. X RT  Other       Participation Level:     None  Minimal   X Active Listener X Interactive    Monopolizing         Participation Quality:  X Appropriate  Inappropriate   X       Attentive        Intrusive          Sharing        Resistant          Supportive        Lethargic       Affective:   X Congruent  Incongruent  Blunted  Flat    Constricted  Anxious  Elated  Angry    Labile  Depressed  Other         Cognitive:  X Alert X Oriented PPTP     Concentration X G  F  P   Attention Span X G  F  P   Short-Term Memory  G  F  P   Long-Term Memory  G  F  P   ProblemSolving/  Decision Making X G  F  P   Ability to Process  Information X G  F  P      Contributing Factors             Delusional             Hallucinating             Flight of Ideas             Other:       Modes of Intervention:  X Education  Support  Exploration    Clarifying  Problem Solving  Confrontation   X Socialization  Limit Setting  Reality Testing   X Activity  Movement  Media    Other:            Response to Learning:  X Able to verbalize current knowledge/experience   X Able to verbalize/acknowledge new learning    Able to retain information   X Capable of insight    Able to change behavior    Progressing to goal    Other:        Comments:

## 2018-06-23 NOTE — PLAN OF CARE
Problem: Altered Mood, Depressive Behavior:  Goal: Ability to disclose and discuss suicidal ideas will improve  Ability to disclose and discuss suicidal ideas will improve   Outcome: Ongoing  PSYCHOEDUCATION GROUP NOTE    Date: 6/23/18  Start Time: 1100  End Time: 1140    Number Participants in Group:  11/21    Goal:  Patient will demonstrate increased interpersonal interaction   Topic: Problem Solving, Critical Thinking, Socialization     Discipline Responsible:   OT  AT  [facility]. x RT [facility] Other       Participation Level:     None  Minimal   x Active Listener x Interactive    Monopolizing         Participation Quality:  x Appropriate  Inappropriate   x       Attentive        Intrusive   x       Sharing        Resistant          Supportive        Lethargic       Affective:   x Congruent  Incongruent  Blunted  Flat    Constricted  Anxious  Elated  Angry    Labile  Depressed  Other         Cognitive:  x Alert x Oriented PPTP     Concentration x G  F  P   Attention Span x G  F  P   Short-Term Memory x G  F  P   Long-Term Memory x G  F  P   ProblemSolving/  Decision Making x G  F  P   Ability to Process  Information x G  F  P      Contributing Factors             Delusional             Hallucinating             Flight of Ideas             Other:       Modes of Intervention:  x Education x Support x Exploration    Clarifying x Problem Solving  Confrontation   x Socialization  Limit Setting x Reality Testing    Activity  Movement x Media    Other:            Response to Learning:  x Able to verbalize current knowledge/experience   x Able to verbalize/acknowledge new learning    Able to retain information    Capable of insight    Able to change behavior   x Progressing to goal    Other:        Comments:

## 2018-06-23 NOTE — PLAN OF CARE
Problem: Altered Mood, Depressive Behavior:  Goal: Able to verbalize and/or display a decrease in depressive symptoms  Able to verbalize and/or display a decrease in depressive symptoms   Outcome: Ongoing  Pt currently denies any thoughts of suicide but rates his depression and anxiety a 8 out of 10. PT is out more and social with select peers. PT remains paranoid and suspicious pt worried people are going to get inside his room and places different items on door handle so when door is opened items fall to ground. Pt requires much encouragement with medications. Pt did attend evening group for a short interval. PT maintained on 15 min safety checks and rounds at irregular intervals.

## 2018-06-23 NOTE — H&P
13.2 (L)   Hematocrit 39.8 (L)   MCV 88.0   MCH 29.1   MCHC 33.1   MPV 8.2   RDW 13.7   Platelet Count 940     Results for Jasmeet Carrion (MRN [id number]) as of 6/23/2018 13:42   6/19/2018 23:45   Sodium 140   Potassium 3.9   Chloride 102   CO2 29   BUN 10   Creatinine 0.91   Bun/Cre Ratio NOT REPORTED   Anion Gap 9   GFR Non- >60   GFR African American >60   Glucose 100 (H)   Calcium 9.0     Results for Jasmeet Carrion (MRN [id number]) as of 6/23/2018 13:42   6/19/2018 23:45   Albumin 4.0   Globulin NOT REPORTED   Albumin/Globulin Ratio NOT REPORTED   Alk Phos 66   ALT 16   AST 27   Bilirubin 0.25 (L)   Bilirubin, Direct 0.08   Bilirubin, Indirect 0.17   Total Protein 7.1     Results for Jasmeet Carrion (MRN [id number]) as of 6/23/2018 13:42   6/19/2018 23:00   Amphetamine Screen, Ur NEGATIVE   Barbiturate Screen, Ur NEGATIVE   Benzodiazepine Screen, Urine NEGATIVE   Buprenorphine Urine NOT REPORTED   Cannabinoid Scrn, Ur NEGATIVE   Cocaine Metabolite, Urine NEGATIVE   Methadone Screen, Urine POSITIVE (A)   Methamphetamine, Urine NOT REPORTED   Oxycodone Screen, Ur NEGATIVE   Propoxyphene, Urine NOT REPORTED   Opiates, Urine NEGATIVE   Tricyclic Antidepressants, Ur NOT REPORTED   MDMA URINE NOT REPORTED     PAST MEDICAL HISTORY     Past Medical History:   Diagnosis Date    Anxiety     Arthritis     Asthma     Bipolar I disorder, most recent episode (or current) depressed, unspecified 9/12/2014    Clostridium difficile infection     [facility]ch[facility] disease) [facility]     Depression     Disease of blood and blood forming organ     Eczema     Fracture, metacarpal     R 4th and 5th    Gastric ulcer     Gastritis 06/13/2018    GERD (gastroesophageal reflux disease)     GI bleed     H. 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[facility])     Neuromuscular disorder ([facility])     [facility]A [facility]     PUD (peptic ulcer disease)     Rheumat[facility] arthritis [facility]     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[facility]     Substance abuse     Suicidal ideation     Suicide attempt by hanging ([facility]     Tobacco dependence     Ulcerative colitis ([facility]     UTI (urinary tract infection)      SURGICAL HISTORY       Past Surgical History:   Procedure Laterality Date    ABDOMEN SURGERY      upper GI scope 7/7/2015    BRONCHOSCOPY      CHOLECYSTECTOMY, LAPAROSCOPIC  07/14/2017    surgery performed at [address]      lumbar puncture    AK COLONOSCOPY W/BIOPSY SINGLE/MULTIPLE  8/9/2017    COLONOSCOPY WITH BIOPSY performed by Adolfo Palmer MD at [facility]    [place] EGD TRANSORAL BIOPSY SINGLE/MULTIPLE N/A 3/20/2017    EGD BIOPSY performed by Loly Lizama MD at [facility]    AK ESOPHAGOGASTRODUODENOSCOPY TRANSORAL DIAGNOSTIC N/A 8/9/2017    EGD ESOPHAGOGASTRODUODENOSCOPY performed by Adolfo Palmer MD at [address] N/A 3/20/2017    [address] [facility] performed by Loly Lizama MD at [facility]  2/4/16    UPPER GASTROINTESTINAL ENDOSCOPY N/A 6/13/2018    GASTRITIS     FAMILY HISTORY       Family History   Problem Relation Age of Onset    Diabetes Father     Alcohol Abuse Father     Depression Father     Arthritis Father     High Blood Pressure Father     Other Father      aneurysm & epilepsy    Migraines Father     Arthritis Mother     Other Mother      aneurysm & epilepsy    Migraines Mother     Diabetes Brother      Aunt and uncles    Depression Brother     Mental Illness Brother     Other Brother      epilepsy    Migraines Brother     Stroke Other      Uncle    Other Brother      murdered Oct 6th, 2014    Colon Cancer Paternal Cousin 43    Other Sister      epilepsy    Migraines Sister      SOCIAL HISTORY       Social or exudates. NECK:  No stiffness, trachea central.  No palpable masses. HEART:  Normotensive. Regular rate and rhythm. No audible murmurs . LUNGS:  Equal on expansion. Clear to auscultation bilaterally with no adventitious sounds, poor respiratory effort. ABDOMEN:  Obese. Soft on palpation. Patient reports tenderness to palpation of epigastrium, no guarding or rigidity. No palpable masses or organomegaly. LYMPHATICS:  No cervical lymphadenopathy. LOCOMOTOR, BACK AND SPINE: Patient reports tenderness in multiple sites. EXTREMITIES:  Symmetrical with no pretibial/pedal edema. No discoloration or ulcerations. No warmth, erythema noted in lower legs bilaterally. Tenderness to palpation of knees, thighs, feet. NEUROLOGIC:  The patient is conscious, alert, oriented. No apparent focal sensory deficits. No motor deficits, muscle strength equal bilaterally. Cranial nerve exam reveals no deficits. PROVISIONAL DIAGNOSES:      Principal Problem:    Bipolar 1 disorder ([address]  Active Problems:    Opioid type dependence, continuous ([address]    Epigastric pain  Resolved Problems:    * No resolved hospital problems.  Liban Medina PA-C on 6/23/2018 at 1:26 PM

## 2018-06-23 NOTE — PROGRESS NOTES
[address]                Date:   6/23/2018  Patient name:  Daisy Golden  Date of admission:  6/20/2018  9:49 AM  MRN:   [id number]  YOB: 1990    Pt seen at the request of Oscar Sheikh MD    CHIEF COMPLAINT:     History Obtained From:  Patient and chart review. HISTORY OF PRESENT ILLNESS:      The patient is a 32 y.o.  male who is admitted to the hospital for abd pain  Chr  Months   has leonard seeing gi     jacob was on bactrim    folliculitis  lw swelling related to ra       rash better   pt agitated     no chnges   Past Medical History:   has a past medical history of Anxiety; Arthritis; Asthma; Bipolar I disorder, most recent episode (or current) depressed, unspecified; Clostridium difficile infection; COPD (chronic obstructive pulmonary disease) ([address] Depression; Disease of blood and blood forming organ; Eczema; Fracture, metacarpal; Gastric ulcer; Gastritis; GERD (gastroesophageal reflux disease); GI bleed; H. pylori infection; H/O blood clots; Head injury; Headache; Insomnia; Juvenile rheumatoid arthritis ([address] Neuromuscular disorder ([address] PFAPA syndrome ([address] PUD (peptic ulcer disease); Rheumatoid arthritis ([address] Rheumatoid arthritis(714.0); Sleep apnea; 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[address] Substance abuse; Suicidal ideation; Suicide attempt by hanging ([address] Tobacco dependence; Ulcerative colitis ([address] and UTI (urinary tract infection). Past Surgical History:   has a past surgical history that includes Colonoscopy; bronchoscopy; other surgical history; Upper gastrointestinal endoscopy (2/4/16); pr egd transoral biopsy single/multiple (N/A, 3/20/2017); sigmoidoscopy (N/A, 3/20/2017); Cholecystectomy, laparoscopic (07/14/2017); pr esophagogastroduodenoscopy transoral diagnostic (N/A, 8/9/2017); pr colonoscopy w/biopsy single/multiple (8/9/2017);  Abdomen surgery; and Upper gastrointestinal endoscopy (N/A, 6/13/2018). Home Medications:    Prior to Admission medications    Medication Sig Start Date End Date Taking? Authorizing Provider   ondansetron (ZOFRAN ODT) 4 MG disintegrating tablet Take 2 tablets by mouth every 8 hours as needed for Nausea or Vomiting 6/20/18   Flaquito Meraz MD   hydrOXYzine (ATARAX) 25 MG tablet Take 1 tablet by mouth every 6 hours as needed for Itching 6/15/18 6/25/18  Alfred Kidd PA-C   pantoprazole (PROTONIX) 20 MG tablet Take 2 tablets by mouth daily 6/15/18   Alfred Kidd PA-C   sucralfate (CARAFATE) 1 GM tablet Take 1 tablet by mouth 4 times daily 6/15/18   Alfred Kidd PA-C   promethazine (PHENERGAN) 25 MG tablet Take 50 mg by mouth 2 times daily    Historical Provider, MD   docusate sodium (COLACE) 100 MG capsule Take 1 capsule by mouth 2 times daily 5/11/18   Beth Conrad DO   albuterol sulfate HFA (VENTOLIN HFA) 108 (90 Base) MCG/ACT inhaler Inhale 2 puffs into the lungs every 6 hours as needed for Wheezing 4/13/18   Oral PINK MD   Fluticasone Furoate-Vilanterol (BREO ELLIPTA) 200-25 MCG/INH AEPB Inhale 1 puff into the lungs daily 4/13/18   Brennan Lemus MD   QUEtiapine (SEROQUEL) 400 MG tablet Take 1 tablet by mouth nightly 4/13/18   Brennan Lemus MD   methadone 10 MG/5ML solution Take 100 mg by mouth daily. Kristian Counter Historical Provider, MD   vitamin D (ERGOCALCIFEROL) 15111 UNITS CAPS capsule Take 1 capsule by mouth once a week 1/5/17   Ambrose Kincaid MD       Allergies:  Geodon [ziprasidone hcl]; Haldol [haloperidol]; Iv dye [iodides]; Dicyclomine; and Famotidine    Social History:   reports that he has been smoking E-Cigarettes and Cigarettes. He has a 2.50 pack-year smoking history. He has never used smokeless tobacco. He reports that he uses drugs. He reports that he does not drink alcohol.      Family History: family history includes Alcohol Abuse in his father; Arthritis in his father and mother; murmur, click, rub or gallop  · Abdomendiffuse tenderness; bowel sounds normal; no masses,  no organomegaly  · Extremities: extremities normal, atraumatic, no cyanosis  1x edema le, diffuse jt swelling   · Neurological: Gait normal. Reflexes normal and symmetric. Sensation grossly normal  · Skin -   ·  follicular rash  · Eye no icterus no redness  · Lymphatic system-no lymphadenopathy no splenomegaly       DIAGNOSTICS:    Laboratory Testing:  CBC:   Recent Labs      06/22/18   1024   HGB  14.0     BMP:    No results for input(s): NA, K, CL, CO2, BUN, CREATININE, GLUCOSE in the last 72 hours. S. Calcium:  No results for input(s): CALCIUM in the last 72 hours. S. Ionized Calcium:No results for input(s): IONCA in the last 72 hours. S. Magnesium:No results for input(s): MG in the last 72 hours. S. Phosphorus:No results for input(s): PHOS in the last 72 hours. S. Glucose:No results for input(s): POCGLU in the last 72 hours. Glycosylated hemoglobin A1C: No results for input(s): LABA1C in the last 72 hours. INR: No results for input(s): INR in the last 72 hours. Hepatic functions:   No results for input(s): ALKPHOS, ALT, AST, PROT, BILITOT, BILIDIR, LABALBU in the last 72 hours. Pancreatic functions:No results for input(s): LACTA, AMYLASE in the last 72 hours. S. Lactic Acid: No results for input(s): LACTA in the last 72 hours. Cardiac enzymes:No results for input(s): CKTOTAL, CKMB, CKMBINDEX, TROPONINI in the last 72 hours. BNP:No results for input(s): BNP in the last 72 hours. Lipid profile: No results for input(s): CHOL, TRIG, HDL, LDLCALC in the last 72 hours.     Invalid input(s): LDL  Blood Gases:   Lab Results   Component Value Date    PH 7.36 12/07/2015    PCO2 44 12/07/2015    PO2 74 12/07/2015    HCO3 25 12/07/2015    O2SAT 94 12/07/2015     Thyroid functions:   Lab Results   Component Value Date    TSH 0.59 03/28/2018        Imaging/Diagonstics:    Xr Acute Abd Series Chest 1 Vw    Result Date: +------------------------------------+----------+---------------+----------+ ! Location                            ! Visualized! Compressibility! Thrombosis! +------------------------------------+----------+---------------+----------+ ! Common Femoral                      !Yes       ! Yes            ! None      ! +------------------------------------+----------+---------------+----------+ ! Prox Femoral                        !Yes       ! Yes            ! None      ! +------------------------------------+----------+---------------+----------+ ! Mid Femoral                         !Yes       ! Yes            ! None      ! +------------------------------------+----------+---------------+----------+ ! Dist Femoral                        !Yes       ! Yes            ! None      ! +------------------------------------+----------+---------------+----------+ ! Popliteal                           !Yes       ! Yes            ! None      ! +------------------------------------+----------+---------------+----------+ ! Sapheno Femoral Junction            ! Yes       ! Yes            ! None      ! +------------------------------------+----------+---------------+----------+ ! PTV                                 ! Yes       ! Yes            ! None      ! +------------------------------------+----------+---------------+----------+ ! Peroneal                            !Yes       ! Yes            ! None      ! +------------------------------------+----------+---------------+----------+ ! Gastroc                             ! Yes       ! Yes            ! None      ! +------------------------------------+----------+---------------+----------+ ! GSV Thigh                           ! Yes       ! Yes            ! None      ! +------------------------------------+----------+---------------+----------+ ! GSV Knee                            ! Yes       ! Yes            ! None      ! +------------------------------------+----------+---------------+----------+ ! GSV Ankle !Yes       !Yes            ! None      ! +------------------------------------+----------+---------------+----------+ ! SSV                                 ! Yes       ! Yes            ! None      ! +------------------------------------+----------+---------------+----------+ Right Doppler Measurements +---------------------------+------+------+--------------------------------+ ! Location                   ! Signal!Reflux! Reflux (msec)                   ! +---------------------------+------+------+--------------------------------+ ! Common Femoral             !Phasic!      !                                ! +---------------------------+------+------+--------------------------------+ ! Prox Femoral               !Phasic!      !                                ! +---------------------------+------+------+--------------------------------+ ! Popliteal                  !Phasic!      !                                ! +---------------------------+------+------+--------------------------------+ Left Lower Extremities DVT Study Measurements Left 2D Measurements +------------------------------------+----------+---------------+----------+ ! Location                            ! Visualized! Compressibility! Thrombosis! +------------------------------------+----------+---------------+----------+ ! Common Femoral                      !Yes       ! Yes            ! None      ! +------------------------------------+----------+---------------+----------+ ! Prox Femoral                        !Yes       ! Yes            ! None      ! +------------------------------------+----------+---------------+----------+ ! Mid Femoral                         !Yes       ! Yes            ! None      ! +------------------------------------+----------+---------------+----------+ ! Dist Femoral                        !Yes       ! Yes            ! None      ! +------------------------------------+----------+---------------+----------+ ! Popliteal !Yes       !Yes            ! None      ! +------------------------------------+----------+---------------+----------+ ! Sapheno Femoral Junction            ! Yes       ! Yes            ! None      ! +------------------------------------+----------+---------------+----------+ ! PTV                                 ! Yes       ! Yes            ! None      ! +------------------------------------+----------+---------------+----------+ ! Peroneal                            !Yes       ! Yes            ! None      ! +------------------------------------+----------+---------------+----------+ ! Gastroc                             ! Yes       ! Yes            ! None      ! +------------------------------------+----------+---------------+----------+ ! GSV Thigh                           ! Yes       ! Yes            ! None      ! +------------------------------------+----------+---------------+----------+ ! GSV Knee                            ! Yes       ! Yes            ! None      ! +------------------------------------+----------+---------------+----------+ ! GSV Ankle                           ! Yes       ! Yes            ! None      ! +------------------------------------+----------+---------------+----------+ ! SSV                                 ! Yes       ! Yes            ! None      ! +------------------------------------+----------+---------------+----------+ Left Doppler Measurements +---------------------------+------+------+--------------------------------+ ! Location                   ! Signal!Reflux! Reflux (msec)                   ! +---------------------------+------+------+--------------------------------+ ! Common Femoral             !Phasic!      !                                ! +---------------------------+------+------+--------------------------------+ ! Prox Femoral               !Phasic!      !                                ! +---------------------------+------+------+--------------------------------+ ! Popliteal !Phasic!      !                                ! +---------------------------+------+------+--------------------------------+        ASSESSMENT:    Patient Active Problem List   Diagnosis    Opioid abuse    Noncompliance    Rectal bleeding    Smoker    [facility] ([facility]    SRIRAM (obstructive sleep apnea)    Primary insomnia    Calculus of bile duct with acute on chronic cholecystitis    Mild intermittent asthma without complication    Recurrent major depressive disorder (HCC)    Gastritis    Generalized abdominal pain    Anemia    Elevated liver enzymes    Nausea and vomiting    Bipolar 1 disorder ([facility])    Opioid type dependence, continuous ([facility])    Epigastric pain     ra on steroids / nsaids     Folliculitis    chr abd pain    has had egd  Showing gastritis  To get colo  Ct  2017  Nothing acute     ? Adhesions   h/o collitis   lft nl     le sweling   appears chr related to joint inflammation      PLAN:     gi to see     doxy    ppi         Rash beter   abd pain    gi to see   still v psychotic   6/23   stable    cont same      gi to see  MD ARBEN Wong [address], [address] [place] [address].    Phone [phone]   Fax: [phone]  Answering Service: [phone]

## 2018-06-24 PROCEDURE — 99232 SBSQ HOSP IP/OBS MODERATE 35: CPT | Performed by: INTERNAL MEDICINE

## 2018-06-24 PROCEDURE — 6370000000 HC RX 637 (ALT 250 FOR IP): Performed by: NURSE PRACTITIONER

## 2018-06-24 PROCEDURE — 6360000002 HC RX W HCPCS: Performed by: PSYCHIATRY & NEUROLOGY

## 2018-06-24 PROCEDURE — 6370000000 HC RX 637 (ALT 250 FOR IP): Performed by: INTERNAL MEDICINE

## 2018-06-24 PROCEDURE — 6370000000 HC RX 637 (ALT 250 FOR IP): Performed by: PSYCHIATRY & NEUROLOGY

## 2018-06-24 PROCEDURE — 1240000000 HC EMOTIONAL WELLNESS R&B

## 2018-06-24 RX ADMIN — PROMETHAZINE HYDROCHLORIDE 25 MG: 12.5 TABLET ORAL at 22:42

## 2018-06-24 RX ADMIN — PREDNISONE 5 MG: 5 TABLET ORAL at 08:36

## 2018-06-24 RX ADMIN — Medication 94 MG: at 08:41

## 2018-06-24 RX ADMIN — MAGNESIUM HYDROXIDE 10 ML: 400 SUSPENSION ORAL at 22:43

## 2018-06-24 RX ADMIN — GABAPENTIN 100 MG: 100 CAPSULE ORAL at 08:37

## 2018-06-24 RX ADMIN — PROMETHAZINE HYDROCHLORIDE 25 MG: 12.5 TABLET ORAL at 08:44

## 2018-06-24 RX ADMIN — DOXYCYCLINE 100 MG: 100 CAPSULE ORAL at 08:37

## 2018-06-24 RX ADMIN — DIPHENHYDRAMINE HCL 25 MG: 25 TABLET ORAL at 22:42

## 2018-06-24 RX ADMIN — ACETAMINOPHEN 500 MG: 500 TABLET, FILM COATED ORAL at 08:36

## 2018-06-24 RX ADMIN — GABAPENTIN 100 MG: 100 CAPSULE ORAL at 14:32

## 2018-06-24 RX ADMIN — DULOXETINE HYDROCHLORIDE 30 MG: 30 CAPSULE, DELAYED RELEASE ORAL at 08:36

## 2018-06-24 RX ADMIN — SUCRALFATE 1 G: 1 TABLET ORAL at 18:01

## 2018-06-24 RX ADMIN — PANTOPRAZOLE SODIUM 40 MG: 40 TABLET, DELAYED RELEASE ORAL at 16:29

## 2018-06-24 RX ADMIN — DOXYCYCLINE 100 MG: 100 CAPSULE ORAL at 22:43

## 2018-06-24 RX ADMIN — GABAPENTIN 100 MG: 100 CAPSULE ORAL at 22:43

## 2018-06-24 RX ADMIN — SUCRALFATE 1 G: 1 TABLET ORAL at 11:48

## 2018-06-24 RX ADMIN — QUETIAPINE FUMARATE 400 MG: 200 TABLET ORAL at 22:43

## 2018-06-24 RX ADMIN — PANTOPRAZOLE SODIUM 40 MG: 40 TABLET, DELAYED RELEASE ORAL at 08:37

## 2018-06-24 ASSESSMENT — PAIN SCALES - GENERAL
PAINLEVEL_OUTOF10: 5
PAINLEVEL_OUTOF10: 7
PAINLEVEL_OUTOF10: 6
PAINLEVEL_OUTOF10: 8
PAINLEVEL_OUTOF10: 8

## 2018-06-24 ASSESSMENT — PAIN DESCRIPTION - LOCATION: LOCATION: BACK

## 2018-06-24 NOTE — PLAN OF CARE
Problem: Altered Mood, Depressive Behavior:  Goal: Able to verbalize acceptance of life and situations over which he or she has no control  Able to verbalize acceptance of life and situations over which he or she has no control   Psychoeducation Group Note    Date: 6/24/18 Start Time: 0845  End Time: 0915    Number Participants in Group:  7/21    Goal:  Patient will demonstrate increased interpersonal interaction   Topic: goal setting and community meeting    Discipline Responsible:   OT  AT  [facility]. X RT  Other       Participation Level:     None  Minimal   X Active Listener X Interactive    Monopolizing         Participation Quality:  X Appropriate  Inappropriate   X       Attentive        Intrusive   X       Sharing        Resistant          Supportive        Lethargic       Affective:   X Congruent  Incongruent  Blunted  Flat    Constricted  Anxious  Elated  Angry    Labile  Depressed  Other         Cognitive:  X Alert X Oriented PPTP     Concentration X G  F  P   Attention Span X G  F  P   Short-Term Memory  G  F  P   Long-Term Memory  G  F  P   ProblemSolving/  Decision Making X G  F  P   Ability to Process  Information X G  F  P      Contributing Factors             Delusional             Hallucinating             Flight of Ideas             Other:       Modes of Intervention:  X Education  Support X Exploration    Clarifying  Problem Solving X Confrontation   X Socialization  Limit Setting  Reality Testing   X Activity  Movement  Media    Other:            Response to Learning:  X Able to verbalize current knowledge/experience    Able to verbalize/acknowledge new learning   X Able to retain information   X Capable of insight    Able to change behavior    Progressing to goal    Other:        Comments:

## 2018-06-24 NOTE — PROGRESS NOTES
Arthritis in his father and mother; Colon Cancer (age of onset: 43) in his paternal cousin; Depression in his brother and father; Diabetes in his brother and father; High Blood Pressure in his father; Mental Illness in his brother; Migraines in his brother, father, mother, and sister; Other in his brother, brother, father, mother, and sister; Stroke in an other family member. REVIEW OF SYSTEMS:    · Constitutional: Negative for weight loss  · Eyes: Negative for visual changes, diplopia, scleral icterus. · ENT: Negative for Headaches, hearing loss, vertigo, mouth sores, sore throat. · Cardiovascular: Negative for lightheadedness/orthostatic symptoms ,chest pain, dyspnea on exertion, palpitations or loss of consciousness. · Respiratory: Negative for cough or wheezing, sputum production, hemoptysis, pleuritic pain. · Gastrointestinal: Negative for nausea/vomiting, change in bowel habits, pos  abdominal pain, dysphagia/appetite loss, hematemesis, blood in stools. · Genitourinary:Negative for change in bladder habits, dysuria, trouble voiding, hematuria. · Musculoskeletal: Negative for gait disturbance, weaknesspos , joint complaints. · Integumentary: pos  for rash, pruritis. · Neurological: Negative for headache, dizziness, change in muscle strength, numbness/tingling, change in gait, balance, coordination,   · Endocrine: negative for temperature intolerance, excessive thirst, fluid intake, or urination, tremor. · Hematologic/Lymphatic: negative for abnormal bruising or bleeding, blood clots, swollen lymph nodes. · Allergic/Immunologic: negative for nasal congestion, pruritis, hives. PHYSICAL EXAM:    BP 98/63   Pulse 127   Temp 98 °F (36.7 °C) (Oral)   Resp 14   Ht 5' 9\" (1.753 m)   Wt 220 lb (99.8 kg)   SpO2 95%   BMI 32.49 kg/m²      · General appearance: well nourished  · HEENT: Head: Normocephalic, no lesions, without obvious abnormality.   · Lungs: clear to auscultation bilaterally  · Heart: regular rate and rhythm, S1, S2 normal, no murmur, click, rub or gallop  · Abdomendiffuse tenderness; bowel sounds normal; no masses,  no organomegaly  · Extremities: extremities normal, atraumatic, no cyanosis  1x edema le, diffuse jt swelling   · Neurological: Gait normal. Reflexes normal and symmetric. Sensation grossly normal  · Skin -   ·  follicular rash  · Eye no icterus no redness  · Lymphatic system-no lymphadenopathy no splenomegaly       DIAGNOSTICS:    Laboratory Testing:  CBC:   Recent Labs      06/22/18   1024   HGB  14.0     BMP:    No results for input(s): NA, K, CL, CO2, BUN, CREATININE, GLUCOSE in the last 72 hours. S. Calcium:  No results for input(s): CALCIUM in the last 72 hours. S. Ionized Calcium:No results for input(s): IONCA in the last 72 hours. S. Magnesium:No results for input(s): MG in the last 72 hours. S. Phosphorus:No results for input(s): PHOS in the last 72 hours. S. Glucose:No results for input(s): POCGLU in the last 72 hours. Glycosylated hemoglobin A1C: No results for input(s): LABA1C in the last 72 hours. INR: No results for input(s): INR in the last 72 hours. Hepatic functions:   No results for input(s): ALKPHOS, ALT, AST, PROT, BILITOT, BILIDIR, LABALBU in the last 72 hours. Pancreatic functions:No results for input(s): LACTA, AMYLASE in the last 72 hours. S. Lactic Acid: No results for input(s): LACTA in the last 72 hours. Cardiac enzymes:No results for input(s): CKTOTAL, CKMB, CKMBINDEX, TROPONINI in the last 72 hours. BNP:No results for input(s): BNP in the last 72 hours. Lipid profile: No results for input(s): CHOL, TRIG, HDL, LDLCALC in the last 72 hours.     Invalid input(s): LDL  Blood Gases:   Lab Results   Component Value Date    PH 7.36 12/07/2015    PCO2 44 12/07/2015    PO2 74 12/07/2015    HCO3 25 12/07/2015    O2SAT 94 12/07/2015     Thyroid functions:   Lab Results   Component Value Date    TSH 0.59 03/28/2018 Measurements Right 2D Measurements +------------------------------------+----------+---------------+----------+ ! Location                            ! Visualized! Compressibility! Thrombosis! +------------------------------------+----------+---------------+----------+ ! Common Femoral                      !Yes       ! Yes            ! None      ! +------------------------------------+----------+---------------+----------+ ! Prox Femoral                        !Yes       ! Yes            ! None      ! +------------------------------------+----------+---------------+----------+ ! Mid Femoral                         !Yes       ! Yes            ! None      ! +------------------------------------+----------+---------------+----------+ ! Dist Femoral                        !Yes       ! Yes            ! None      ! +------------------------------------+----------+---------------+----------+ ! Popliteal                           !Yes       ! Yes            ! None      ! +------------------------------------+----------+---------------+----------+ ! Sapheno Femoral Junction            ! Yes       ! Yes            ! None      ! +------------------------------------+----------+---------------+----------+ ! PTV                                 ! Yes       ! Yes            ! None      ! +------------------------------------+----------+---------------+----------+ ! Peroneal                            !Yes       ! Yes            ! None      ! +------------------------------------+----------+---------------+----------+ ! Gastroc                             ! Yes       ! Yes            ! None      ! +------------------------------------+----------+---------------+----------+ ! GSV Thigh                           ! Yes       ! Yes            ! None      ! +------------------------------------+----------+---------------+----------+ ! GSV Knee                            ! Yes       ! Yes            ! None      ! +------------------------------------+----------+---------------+----------+ ! GSV Ankle                           ! Yes       ! Yes            ! None      ! +------------------------------------+----------+---------------+----------+ ! SSV                                 ! Yes       ! Yes            ! None      ! +------------------------------------+----------+---------------+----------+ Right Doppler Measurements +---------------------------+------+------+--------------------------------+ ! Location                   ! Signal!Reflux! Reflux (msec)                   ! +---------------------------+------+------+--------------------------------+ ! Common Femoral             !Phasic!      !                                ! +---------------------------+------+------+--------------------------------+ ! Prox Femoral               !Phasic!      !                                ! +---------------------------+------+------+--------------------------------+ ! Popliteal                  !Phasic!      !                                ! +---------------------------+------+------+--------------------------------+ Left Lower Extremities DVT Study Measurements Left 2D Measurements +------------------------------------+----------+---------------+----------+ ! Location                            ! Visualized! Compressibility! Thrombosis! +------------------------------------+----------+---------------+----------+ ! Common Femoral                      !Yes       ! Yes            ! None      ! +------------------------------------+----------+---------------+----------+ ! Prox Femoral                        !Yes       ! Yes            ! None      ! +------------------------------------+----------+---------------+----------+ ! Mid Femoral                         !Yes       ! Yes            ! None      ! +------------------------------------+----------+---------------+----------+ ! Dist Femoral                        !Yes       ! Yes            ! None      ! +------------------------------------+----------+---------------+----------+ ! Popliteal                           !Yes       ! Yes            ! None      ! +------------------------------------+----------+---------------+----------+ ! Sapheno Femoral Junction            ! Yes       ! Yes            ! None      ! +------------------------------------+----------+---------------+----------+ ! PTV                                 ! Yes       ! Yes            ! None      ! +------------------------------------+----------+---------------+----------+ ! Peroneal                            !Yes       ! Yes            ! None      ! +------------------------------------+----------+---------------+----------+ ! Gastroc                             ! Yes       ! Yes            ! None      ! +------------------------------------+----------+---------------+----------+ ! GSV Thigh                           ! Yes       ! Yes            ! None      ! +------------------------------------+----------+---------------+----------+ ! GSV Knee                            ! Yes       ! Yes            ! None      ! +------------------------------------+----------+---------------+----------+ ! GSV Ankle                           ! Yes       ! Yes            ! None      ! +------------------------------------+----------+---------------+----------+ ! SSV                                 ! Yes       ! Yes            ! None      ! +------------------------------------+----------+---------------+----------+ Left Doppler Measurements +---------------------------+------+------+--------------------------------+ ! Location                   ! Signal!Reflux! Reflux (msec)                   ! +---------------------------+------+------+--------------------------------+ ! Common Femoral             !Phasic!      !                                ! +---------------------------+------+------+--------------------------------+ ! Prox Femoral               !Phasic!      ! ! +---------------------------+------+------+--------------------------------+ ! Popliteal                  !Phasic!      !                                ! +---------------------------+------+------+--------------------------------+        ASSESSMENT:    Patient Active Problem List   Diagnosis    Opioid abuse    Noncompliance    Rectal bleeding    Smoker    Juvenile rheumatoid arthritis ([facility] [address]    SRIRAM (obstructive sleep apnea)    Primary insomnia    Calculus of bile duct with acute on chronic cholecystitis    Mild intermittent asthma without complication    Recurrent major depressive disorder (HCC)    Gastritis    Generalized abdominal pain    Anemia    Elevated liver enzymes    Nausea and vomiting    Bipolar 1 disorder (HCC)    Opioid type dependence, continuous (HCC)    Epigastric pain     ra on steroids / nsaids     Folliculitis    chr abd pain    has had egd  Showing gastritis  To get colo  Ct  2017  Nothing acute     ? Adhesions   h/o collitis   lft nl     le sweling   appears chr related to joint inflammation      PLAN:     gi to see     doxy    ppi         Rash beter   abd pain    gi to see   still v psychotic   6/23   stable    cont same      gi to see  6/24   stable    rash better   awaiting gi eval     ra stable   MD ARBEN Varma [address], [address] [place] [address].    Phone [phone]   Fax: [phone]  Answering Service: [phone]

## 2018-06-24 NOTE — PLAN OF CARE
Problem: Altered Mood, Depressive Behavior:  Goal: Able to verbalize and/or display a decrease in depressive symptoms  Able to verbalize and/or display a decrease in depressive symptoms   Outcome: Ongoing  Pt denies SI, remains paranoid, reports good sleep, took meds, ate breakfast, select groups

## 2018-06-24 NOTE — CARE COORDINATION
PSYCHOTHERAPY GROUP NOTE    06/24/2018               Start Time:    1000                 End Time: 7599      Number Participants in Group: 8/21      Goals: relationships and recovery         RT x SW  Nsg  LPN   BHTII   LPC       Participation Level:     None  Minimal   x Active Listener x Interactive    Monopolizing         Participation Quality:  x Appropriate  Inappropriate   x  Attentive   Intrusive   x  Sharing   Resistant   x  Supportive    Lethargic       Affective:   x Congruent  Incongruent  Blunted  Flat    Constricted  Anxious  Elated  Angry    Labile  Depressed  Other         Cognitive:  x Alert x Oriented PPTS     Concentration G x F  P    Attention Span G x F  P    Short-Term Memory G x F  P    Long-Term Memory G x F  P    Problem Solving/  Decision Making G x F  P    Ability to Process  Information G x F  P       Contributing Factors             Delusional             Hallucinating             Flight of Ideas             Other: poor concentration       Modes of Intervention:   Education x Support x Exploration   x Clarifying x Problem Solving  Confrontation    Socialization  Limit Setting  Reality Testing    Activity  Movement  Media    Other:          Response to Learning:  x Able to verbalize current knowledge/experience   x Able to verbalize/acknowledge new learning   x Able to retain information   x Capable of insight   x Able to change behavior   x Progressing to goal    Other: intrusive and monopolizing       Comments:  participated in group was active

## 2018-06-25 PROCEDURE — 6370000000 HC RX 637 (ALT 250 FOR IP): Performed by: NURSE PRACTITIONER

## 2018-06-25 PROCEDURE — 6370000000 HC RX 637 (ALT 250 FOR IP): Performed by: INTERNAL MEDICINE

## 2018-06-25 PROCEDURE — 6360000002 HC RX W HCPCS: Performed by: PSYCHIATRY & NEUROLOGY

## 2018-06-25 PROCEDURE — 1240000000 HC EMOTIONAL WELLNESS R&B

## 2018-06-25 PROCEDURE — 99232 SBSQ HOSP IP/OBS MODERATE 35: CPT | Performed by: INTERNAL MEDICINE

## 2018-06-25 PROCEDURE — 6370000000 HC RX 637 (ALT 250 FOR IP): Performed by: PSYCHIATRY & NEUROLOGY

## 2018-06-25 RX ADMIN — GABAPENTIN 100 MG: 100 CAPSULE ORAL at 15:10

## 2018-06-25 RX ADMIN — ACETAMINOPHEN 500 MG: 500 TABLET, FILM COATED ORAL at 20:31

## 2018-06-25 RX ADMIN — DOXYCYCLINE 100 MG: 100 CAPSULE ORAL at 22:41

## 2018-06-25 RX ADMIN — PANTOPRAZOLE SODIUM 40 MG: 40 TABLET, DELAYED RELEASE ORAL at 15:10

## 2018-06-25 RX ADMIN — Medication 94 MG: at 09:20

## 2018-06-25 RX ADMIN — SUCRALFATE 1 G: 1 TABLET ORAL at 11:43

## 2018-06-25 RX ADMIN — PROMETHAZINE HYDROCHLORIDE 25 MG: 12.5 TABLET ORAL at 20:31

## 2018-06-25 RX ADMIN — GABAPENTIN 100 MG: 100 CAPSULE ORAL at 09:18

## 2018-06-25 RX ADMIN — GABAPENTIN 100 MG: 100 CAPSULE ORAL at 22:41

## 2018-06-25 RX ADMIN — DOXYCYCLINE 100 MG: 100 CAPSULE ORAL at 09:17

## 2018-06-25 RX ADMIN — PANTOPRAZOLE SODIUM 40 MG: 40 TABLET, DELAYED RELEASE ORAL at 09:19

## 2018-06-25 RX ADMIN — PROMETHAZINE HYDROCHLORIDE 25 MG: 12.5 TABLET ORAL at 09:30

## 2018-06-25 ASSESSMENT — PAIN SCALES - GENERAL
PAINLEVEL_OUTOF10: 8
PAINLEVEL_OUTOF10: 3
PAINLEVEL_OUTOF10: 9
PAINLEVEL_OUTOF10: 9

## 2018-06-25 ASSESSMENT — PAIN DESCRIPTION - PAIN TYPE
TYPE: ACUTE PAIN
TYPE: ACUTE PAIN

## 2018-06-25 ASSESSMENT — PAIN DESCRIPTION - LOCATION
LOCATION: ABDOMEN
LOCATION: GENERALIZED

## 2018-06-25 NOTE — PROGRESS NOTES
[address]                Date:   6/25/2018  Patient name:  Sujata Leon  Date of admission:  6/20/2018  9:49 AM  MRN:   [id number]  YOB: 1990    Pt seen at the request of Shiela Delcid MD    CHIEF COMPLAINT:     History Obtained From:  Patient and chart review. HISTORY OF PRESENT ILLNESS:      The patient is a 32 y.o.  male who is admitted to the hospital for abd pain  Chr  Months   has leonard seeing gi     jacob was on bactrim    folliculitis  lw swelling related to ra       rash better   pt agitated     no chnges      rash improving abd pain better    Stable still hjas abd pain  Past Medical History:   has a past medical history of Anxiety; Arthritis; Asthma; Bipolar I disorder, most recent episode (or current) depressed, unspecified; Clostridium difficile infection; COPD (chronic obstructive pulmonary disease) ([address] Depression; Disease of blood and blood forming organ; Eczema; Fracture, metacarpal; Gastric ulcer; Gastritis; GERD (gastroesophageal reflux disease); GI bleed; H. pylori infection; H/O blood clots; Head injury; Headache; Insomnia; Juvenile rheumatoid arthritis ([address] Neuromuscular disorder ([address] PFAPA syndrome ([address] PUD (peptic ulcer disease); Rheumatoid arthritis ([address] Rheumatoid arthritis(714.0); Sleep apnea; 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[address] Substance abuse; Suicidal ideation; Suicide attempt by hanging ([address] Tobacco dependence; Ulcerative colitis ([address] and UTI (urinary tract infection). Past Surgical History:   has a past surgical history that includes Colonoscopy; bronchoscopy; other surgical history; Upper gastrointestinal endoscopy (2/4/16); pr egd transoral biopsy single/multiple (N/A, 3/20/2017); sigmoidoscopy (N/A, 3/20/2017);  Cholecystectomy, laparoscopic (07/14/2017); pr esophagogastroduodenoscopy transoral diagnostic (N/A, 8/9/2017); pr colonoscopy w/biopsy single/multiple (8/9/2017);

## 2018-06-25 NOTE — PLAN OF CARE
Problem: Altered Mood, Depressive Behavior:  Goal: Ability to disclose and discuss suicidal ideas will improve  Ability to disclose and discuss suicidal ideas will improve   Outcome: Ongoing  Pt was able to disclose symptoms regarding mental illness. Pt continues to report paranoid like ideation but was observed selectively social and communicating with staff. Pt was encouraged to communicate with staff if symptoms worsen or needs arise.

## 2018-06-25 NOTE — PLAN OF CARE
Problem: Altered Mood, Depressive Behavior:  Goal: Able to verbalize and/or display a decrease in depressive symptoms  Able to verbalize and/or display a decrease in depressive symptoms   Outcome: Ongoing  Pt did not participate in psychoeducational group at 10:00am despite staff encouragement. 1:1 offered, pt refused.

## 2018-06-25 NOTE — PLAN OF CARE
Problem: Altered Mood, Depressive Behavior:  Goal: Able to verbalize acceptance of life and situations over which he or she has no control  Able to verbalize acceptance of life and situations over which he or she has no control   Outcome: Ongoing  Pt is very evasive and avoidant of discussing issues. States, \"I just have a lot going on outside of here\". Preoccupied, poor boundaries with female peer. Minimal group participation. Needs limits set at times. Moments of being lethargic, mumbling speech.

## 2018-06-26 VITALS
TEMPERATURE: 97.7 F | RESPIRATION RATE: 14 BRPM | HEART RATE: 82 BPM | DIASTOLIC BLOOD PRESSURE: 112 MMHG | OXYGEN SATURATION: 95 % | SYSTOLIC BLOOD PRESSURE: 150 MMHG | HEIGHT: 69 IN | WEIGHT: 220 LBS | BODY MASS INDEX: 32.58 KG/M2

## 2018-06-26 PROBLEM — F31.9 BIPOLAR 1 DISORDER (HCC): Chronic | Status: RESOLVED | Noted: 2018-06-20 | Resolved: 2018-06-26

## 2018-06-26 PROCEDURE — 99231 SBSQ HOSP IP/OBS SF/LOW 25: CPT | Performed by: INTERNAL MEDICINE

## 2018-06-26 PROCEDURE — 5130000000 HC BRIDGE APPOINTMENT

## 2018-06-26 PROCEDURE — 1800000000 HC LEAVE OF ABSENCE

## 2018-06-26 PROCEDURE — 6360000002 HC RX W HCPCS: Performed by: PSYCHIATRY & NEUROLOGY

## 2018-06-26 PROCEDURE — 6370000000 HC RX 637 (ALT 250 FOR IP): Performed by: PSYCHIATRY & NEUROLOGY

## 2018-06-26 PROCEDURE — 6370000000 HC RX 637 (ALT 250 FOR IP): Performed by: INTERNAL MEDICINE

## 2018-06-26 RX ORDER — ERGOCALCIFEROL 1.25 MG/1
50000 CAPSULE ORAL WEEKLY
Qty: 4 CAPSULE | Refills: 0 | Status: ON HOLD | OUTPATIENT
Start: 2018-06-26 | End: 2018-08-13

## 2018-06-26 RX ORDER — PREDNISONE 1 MG/1
5 TABLET ORAL DAILY
Qty: 10 TABLET | Refills: 0 | Status: ON HOLD | OUTPATIENT
Start: 2018-06-26 | End: 2018-06-28

## 2018-06-26 RX ORDER — QUETIAPINE FUMARATE 400 MG/1
400 TABLET, FILM COATED ORAL NIGHTLY
Qty: 30 TABLET | Refills: 0 | Status: ON HOLD | OUTPATIENT
Start: 2018-06-26 | End: 2018-08-13 | Stop reason: HOSPADM

## 2018-06-26 RX ORDER — DULOXETIN HYDROCHLORIDE 30 MG/1
30 CAPSULE, DELAYED RELEASE ORAL DAILY
Qty: 30 CAPSULE | Refills: 0 | Status: ON HOLD | OUTPATIENT
Start: 2018-06-26 | End: 2018-08-13 | Stop reason: HOSPADM

## 2018-06-26 RX ORDER — DOXYCYCLINE 100 MG/1
100 CAPSULE ORAL EVERY 12 HOURS SCHEDULED
Qty: 10 CAPSULE | Refills: 0 | Status: ON HOLD | OUTPATIENT
Start: 2018-06-26 | End: 2018-06-28

## 2018-06-26 RX ORDER — PANTOPRAZOLE SODIUM 20 MG/1
40 TABLET, DELAYED RELEASE ORAL DAILY
Qty: 30 TABLET | Refills: 0 | Status: ON HOLD | OUTPATIENT
Start: 2018-06-26 | End: 2018-07-02 | Stop reason: HOSPADM

## 2018-06-26 RX ORDER — SUCRALFATE 1 G/1
1 TABLET ORAL 4 TIMES DAILY
Qty: 40 TABLET | Refills: 0 | Status: ON HOLD | OUTPATIENT
Start: 2018-06-26 | End: 2018-08-13

## 2018-06-26 RX ADMIN — PROMETHAZINE HYDROCHLORIDE 25 MG: 12.5 TABLET ORAL at 09:26

## 2018-06-26 RX ADMIN — GABAPENTIN 100 MG: 100 CAPSULE ORAL at 09:15

## 2018-06-26 RX ADMIN — Medication 94 MG: at 09:17

## 2018-06-26 RX ADMIN — DOXYCYCLINE 100 MG: 100 CAPSULE ORAL at 09:15

## 2018-06-26 ASSESSMENT — PAIN SCALES - GENERAL
PAINLEVEL_OUTOF10: 0
PAINLEVEL_OUTOF10: 6
PAINLEVEL_OUTOF10: 0

## 2018-06-26 ASSESSMENT — PAIN DESCRIPTION - PAIN TYPE: TYPE: ACUTE PAIN

## 2018-06-26 ASSESSMENT — PAIN DESCRIPTION - LOCATION: LOCATION: BACK

## 2018-06-26 NOTE — DISCHARGE SUMMARY
[address] [facility]                   [address] [facility], [address]                                 DISCHARGE SUMMARY    PATIENT NAME: Worthy Romberg                    :        1990  MED REC NO:   [id number]                              ROOM:       0114  ACCOUNT NO:   [de-identified]                           ADMIT DATE: 2018  PROVIDER:     Adele Wilson                   [facility] DATE:    HISTORY OF PRESENTING ILLNESS AND REASON FOR CURRENT ADMISSION:  The  patient is a 75-year-old male who is having delusions of persecution and  delusions of reference, feels depressed and sad, has suicidal thoughts,  feeling hopeless and useless. With this, he is admitted to [address] [place] [address]:  History of bipolar disorder. MEDICAL AND SURGICAL HISTORY:  He has a history of arthritis. ALLERGIES:  He is allergic to GEODON, HALOPERIDOL, IV DYE, DICYCLOMINE, and  FAMOTIDINE. COURSE DURING THE HOSPITAL STAY:  After getting admitted to the hospital,  he was started on his medications. He was started on Cymbalta 30 mg p.o.  daily, Neurontin 100 mg three times a day, 94 mg of methadone daily,  Protonix 40 mg p.o. daily, prednisone 5 mg p.o. daily, Seroquel 400 mg at  bedtime, sucralfate 1 gm daily. With this, he is stabilized and he is  being discharged home. MENTAL STATUS EXAMINATION:  At the time of discharge, the patient is  cooperative. He has adequate eye contact. He has adequate rapport. His  speech is within normal limits. His mood subjectively okay, objectively  appears to be euthymic. He has appropriate affect. Thought process and  contents are within normal limits. He denies any hallucinations or  delusions. He denies any suicidal or homicidal thoughts or plans. He is  of average intelligence. He is oriented to time, place, and person. His  memory to recent, remote, and immediate events are within normal limits.
MD on 6/26/2018 at 8:53 AM

## 2018-06-26 NOTE — PROGRESS NOTES
Psychoeducation Group Note    Date: 6/26/2018  Start Time: 1000  End Time: 0043    Number Participants in Group:  8    Goal:  Patient will demonstrate increased interpersonal interaction. Topic:  Social Skills / Problem Solving / Leisure Skills + Awareness    Discipline Responsible:   OT  AT  [facility]. X RT  Other       Participation Level:     None x Minimal    Active Listener  Interactive    Monopolizing         Participation Quality:  x Appropriate  Inappropriate          Attentive        Intrusive   x       Sharing        Resistant          Supportive        Lethargic       Affective:    Congruent x Incongruent x Blunted  Flat    Constricted  Anxious  Elated  Angry    Labile  Depressed  Other  Bright       Cognitive:  x Alert x Oriented PPTP     Concentration  G  F x P   Attention Span  G  F x P   Short-Term Memory  G x F  P   Long-Term Memory  G  F  P   ProblemSolving/  Decision Making  G x F  P   Ability to Process  Information  G x F  P      Contributing Factors             Delusional             Hallucinating             Flight of Ideas             Other:       Modes of Intervention:  x Education  Support x Exploration   x Clarifying x Problem Solving  Confrontation   x Socialization x Limit Setting x Reality Testing   x Activity  Movement  Media    Other:            Response to Learning:  x Able to verbalize current knowledge/experience   x Able to verbalize/acknowledge new learning   x Able to retain information   x Capable of insight    Able to change behavior   x Progressing to goal    Other:        Comments:  Patient needed frequent prompting and reminders given during task at hand. Patient was frequently in and out of group room.

## 2018-06-26 NOTE — BH NOTE
[address] [facility]                   [address] [facility], [address]                              PSYCHIATRIC EVALUATION    PATIENT NAME: Marianela Stokes                    :        1990  MED REC NO:   [id number]                              ROOM:       3351  ACCOUNT NO:   [de-identified]                           ADMIT DATE: 2018  PROVIDER:     Kiah Pereira    COMPREHENSIVE PSYCHIATRIC EVALUATION    HISTORY OF PRESENTING ILLNESS AND REASON FOR CURRENT ADMISSION:  The  patient is a 69-year-old male feeling depressed, sad, hopeless, useless,  worthless. He was having homicidal and suicidal thoughts and plans. He  was planning to overdose and die. He feels that he should go and shoot  everybody and kill himself. With this, he is admitted to [address] [place] [address]:  History of bipolar disorder and opioid  dependence. MEDICAL AND SURGICAL HISTORY:  He has a history of arthritis. ALLERGIES:  He is allergic to GEODON, HALOPERIDOL, IV DYE,  IODINE-CONTAINING IV DYE, BENTYL, and FAMOTIDINE. PERSONAL, FAMILY, AND SOCIAL HISTORY:  He is currently living by himself in  an apartment and he has some contact with his mother. His brother got  killed in a shooting about 5 years ago. He has a tenth grade education. He has no job or income. He gets social security disability. He has  long-term history of opioid dependence. He is currently on methadone. MENTAL STATUS EXAMINATION:  The patient is cooperative. He has decreased  psychomotor activity. He has adequate rapport. He has poor eye contact. His speech is within normal limits. His mood subjectively depressed and  sad, objectively appears to be depressed and has appropriate affect. Thought process within normal limits. Thought content predominantly of  depression, sadness, lack of energy, motivation, sleep, appetite, and  interest in doing things.   He complains of
LPN documentation reviewed by RN
PSYCHOEDUCATION GROUP NOTE       Date:      6/23/18            Start Time:      4p                   End Time: 445p    Number Participants in Group:   6/20    Name of group:   Choices       RT  SW  Nsg  LPN  x BHTII  Other       Participation Level:     None x Minimal    Active Listener  Interactive    Monopolizing         Participation Quality:   Appropriate  Inappropriate     Attentive   Intrusive     Sharing   Resistant     Supportive    Lethargic       Affective:    Congruent  Incongruent  Blunted  Flat   x Constricted  Anxious  Elated  Angry    Labile  Depressed  Other         Cognitive:  x Alert  Oriented PPTS     Concentration G x F  P    Attention Span G x F  P    Short-Term Memory G x F  P    Long-Term Memory G x F  P    ProblemSolving/  Decision Making G x F  P    Ability to Process  Information G x F  P       Contributing Factors             Delusional             Hallucinating             Flight of Ideas             Other: poor concentration       Modes of Intervention:   Education  Support  Exploration    Clarifying  Problem Solving  Confrontation    Socialization  Limit Setting  Reality Testing    Activity  Movement  Media    Other:          Response to Learning:   Able to verbalize current knowledge/experience    Able to verbalize/acknowledge new learning    Able to retain information    Capable of insight    Able to change behavior    Progressing to goal    Other:        Comments:   Abbey Mejia attended 4pm wellness group today.
PSYCHOEDUCATION GROUP NOTE    Date: 6/24/18       Start Time: 2030      End Time: 2100    Number Participants in Group: 12     Name of group: Wrap Up     Discipline Responsible:   OT  AT    Ns.  x MHP Other       Participation Level:     None x Minimal    Active Listener  Interactive    Monopolizing         Participation Quality:   Appropriate  Inappropriate          Attentive        Intrusive          Sharing x       Resistant          Supportive        Lethargic       Affective:   x Congruent  Incongruent  Blunted  Flat    Constricted  Anxious  Elated  Angry    Labile  Depressed  Other         Cognitive:  x Alert x Oriented PPTP     Concentration  G  F x P   Attention Span  G  F x P   Short-Term Memory x G  F  P   Long-Term Memory x G  F  P   ProblemSolving/  Decision Making x G  F  P   Ability to Process  Information x G  F  P      Contributing Factors             Delusional             Hallucinating             Flight of Ideas             Other:        Modes of Intervention:  x Education x Support  Exploration   x Clarifying x Problem Solving  Confrontation   x Socialization  Limit Setting  Reality Testing    Activity  Movement  Media    Other:            Response to Learning:   Able to verbalize current knowledge/experience    Able to verbalize/acknowledge new learning    Able to retain information    Capable of insight    Able to change behavior    Progressing to goal    Other:        Comments:
Patient given tobacco quitline number 12701496747 at this time, refusing to call at this time, states \" I just dont want to quit now\"- patient given information as to the dangers of long term tobacco use. Continue to reinforce the importance of tobacco cessation.
Patient is having psychotic symptoms such as delusions of persecution and delusions of reference. He believes that people are sending demons to kill him. He is hearing voices making derogatory remarks. Some of the voices are telling him to beat up people. Some voices are telling him to kill himself and die. He complains of agitation and irritability. He feels that he is going to explode and beat up someone. He has poor eye contact. He has poor rapport. He has no insight. His judgement is impaired. Plan: to continue to monitor his progress. Supportive and insight oriented therapy were provided.
Patient is pacing up and down. He complains of racing thoughts and agitation. He has lose associations and thought blocking. He feels that people are watching him. He believes that people are trying to harm him and kill him by shooting at him. He complains of mood swings. He has inappropriate mood and affect. He has been hearing voices telling him to kill himself and die and beat up other people. He is unable to focus and concentrate. He is oriented to time place and person. His memory to immediate, recent and remote events are within noramal limits. He has no insight and his judgement is impaired. Plan: to continue current management.
Patient was admitted through the [facility] with complaints of increasing depression and suicidal ideation. Detailed note dictated    Mental Status Examination:    Appearance: grooming:alert, cooperative, severe distress  Motor Activity:psychomotor activity: slow  Speech:rate/volume:delayed, increased latency of response, normal pitch and normal volume  Attitude: cooperative with interview  Affect: labile  Mood: dysphoric  Thought Processes: linear goal directed  Thought Content: ideas of helplessness and hopelessness  Suicidal Ideation: He has suicidal thoughts and plans to kill himself by overdose and die. Homicidal Ideation: patient has homicidal thoughts and wants to shoot people  Perceptions: Denies  Insight/Judgment: impaired  Cognition: Alert, oriented to person, place, time, and situation; immediate recall 3/3 and delayed recall 3/3; concentration mildly impaired; it is evident during interview that patient's fund of knowledge is average; no language deficit noted  Axis I Bipolar disorder current episode depressed.   Axis II None  Axis III Arthritis  Axis IV Severe  Axis V 30  Tx plan: Safe therapeutic milieu  Medications as listed   Current Facility-Administered Medications   Medication Dose Route Frequency Provider Last Rate Last Dose    acetaminophen (TYLENOL) tablet 500 mg  500 mg Oral Q4H PRN Eduin Banerjee MD        aluminum & magnesium hydroxide-simethicone (MAALOX) 200-200-20 MG/5ML suspension 30 mL  30 mL Oral TID PRN Eduin Banerjee MD        hydrOXYzine (ATARAX) tablet 25 mg  25 mg Oral TID PRN Eduin MD Chriss        traZODone (DESYREL) tablet 50 mg  50 mg Oral Nightly PRN Eduin Banerjee MD        magnesium hydroxide (MILK OF MAGNESIA) 400 MG/5ML suspension 10 mL  10 mL Oral BID PRN Eduin Banerjee MD        loperamide (IMODIUM) capsule 2 mg  2 mg Oral BID PRN Eduin MD Chriss        meloxicam (MOBIC) tablet 15 mg  15 mg Oral Daily Eduin Banerjee MD
Psychoeducation Group Note    Date: 06/22/18  Start Time: 1600  End Time: 1650    Number Participants in Group: 13/19    Goal:  Patient will demonstrate increased interpersonal interaction   Topic: Wellness group- Positive Statement Ball    Discipline Responsible:   OT  AT   X Nsg.  RT  Other       Participation Level:     None  Minimal   X Active Listener  Interactive    Monopolizing         Participation Quality:  X Appropriate  Inappropriate          Attentive        Intrusive          Sharing        Resistant          Supportive        Lethargic       Affective:   X Congruent  Incongruent  Blunted  Flat    Constricted  Anxious  Elated  Angry    Labile  Depressed  Other         Cognitive:  X Alert  Oriented PPTP     Concentration  G X F  P   Attention Span  G X F  P   Short-Term Memory  G X F  P   Long-Term Memory  G X F  P   ProblemSolving/  Decision Making  G X F  P   Ability to Process  Information  G X F  P      Contributing Factors             Delusional             Hallucinating             Flight of Ideas             Other:       Modes of Intervention:   Education X Support  Exploration    Clarifying  Problem Solving  Confrontation   X Socialization  Limit Setting  Reality Testing   X Activity  Movement  Media    Other:            Response to Learning:  X Able to verbalize current knowledge/experience    Able to verbalize/acknowledge new learning    Able to retain information    Capable of insight    Able to change behavior   X Progressing to goal   X Other: Pt. Did fall asleep then left group early       Comments: Pt. Able to say positive things about them selves and peers.
Pt. Did not attend socialization group [id number], despite staff encouragement
RN reviewed [facility]'s charting for this shift.
This RN has reviewed and agrees with all the documentation entered by Sary Aranda and Rolando Webber LPN.
No  Facial Expression: Expressionless, Flat  Affect: Blunt  Level of Consciousness: Lethargic  Mood:Normal: No  Mood: Suspicious, Anxious, Depressed  Motor Activity:Normal: No  Motor Activity: Decreased  Interview Behavior: Cooperative  Preception: [place] to Person, [place] to Time, [place] to Place, [place] to Situation  Attention:Normal: No  Attention: Distractible, Unable to Concentrate  Thought Processes: Blocking  Thought Content:Normal: No  Thought Content: Obsessions, Paranoia, Poverty of Content  Hallucinations: None  Delusions: Yes  Delusions: Persecution  Memory:Normal: No  Memory: Confabulation, Poor Recent, Poor Remote  Insight and Judgment: No  Insight and Judgment: Poor Judgment, Poor Insight  Present Suicidal Ideation: Yes  Present Homicidal Ideation: Yes    Tobacco Screening:  Practical Counseling, on admission, reuben X, if applicable and completed (first 3 are required if patient doesn't refuse):            ( )  Recognizing danger situations (included triggers and roadblocks)                    ( )  Coping skills (new ways to manage stress, exercise, relaxation techniques, changing routine, distraction)                                                           ( )  Basic information about quitting (benefits of quitting, techniques in how to quit, available resources  ( ) Referral for counseling faxed to [facility]                                           (x ) Patient refused counseling  ( ) Patient has not smoked in the last 30 days    Metabolic Screening:    Lab Results   Component Value Date    LABA1C 5.7 10/23/2014       No results for input(s): CHOL, TRIG, HDL, LDLCALC, LABVLDL in the last 72 hours. Body mass index is 32.49 kg/m².     BP Readings from Last 2 Encounters:   06/20/18 125/74   06/20/18 123/63           Pt admitted with followings belongings:  Dentures: None  Vision - Corrective Lenses: None  Hearing Aid: None  Jewelry: None  Body Piercings Removed: N/A  Clothing:

## 2018-06-26 NOTE — PROGRESS NOTES
Lab Results   Component Value Date    TSH 0.59 03/28/2018        Imaging/Diagonstics:    Xr Acute Abd Series Chest 1 Vw    Result Date: 6/15/2018  EXAMINATION: ACUTE ABDOMINAL SERIES WITH SINGLE  VIEW OF THE CHEST 6/15/2018 4:20 pm COMPARISON: Abdominal radiograph May 11, 2018. Acute abdominal series May 9, 2018. HISTORY: ORDERING SYSTEM PROVIDED HISTORY: Pain TECHNOLOGIST PROVIDED HISTORY: Reason for exam:->Pain Reason for exam:->epigastric abdominal pain Ordering Physician Provided Reason for Exam: upper abdomen pain Acuity: Acute Type of Exam: Initial Relevant Medical/Surgical History: EGD FINDINGS: Lungs are clear without acute process. No pleural effusion or pneumothorax. No focal consolidation or edema. Cardiomediastinal silhouette and bony thorax are without acute abnormality. No evidence of intraperitoneal free air. Moderate stool burden. Nonspecific bowel gas pattern without evidence of obstruction. No abnormal calcifications. Osseous structures are intact. No acute process in the chest. Moderate stool burden. No bowel obstruction or free air. Vl Dup Lower Extremity Venous Bilateral    Result Date: 6/11/2018    [facility]  Vascular Lower Extremities DVT Study Procedure   Patient Name      Christina Kim      Date of Study             06/11/2018                    Leni Strauss   Date of Birth     1990   Gender                    Male   Age               32 year(s)   Race                      Black   Room Number       29   Corporate ID #    [id number]   Patient Acct #    [de-identified]   MR #              [id number]      Dene Estimable   Accession #       [id number]    Interpreting Physician    Rosanna Peng   Referring Nurse                Referring Physician       Trupti Jefferson  Practitioner  Procedure Type of Study:   Veins: Lower Extremities DVT Study, Venous Scan Lower Bilateral.  Indications for Study:Pain, leg. Patient Status:ER.  Technical Quality:Limited ! +---------------------------+------+------+--------------------------------+ ! Popliteal                  !Phasic!      !                                ! +---------------------------+------+------+--------------------------------+        ASSESSMENT:    Patient Active Problem List   Diagnosis    Opioid abuse    Noncompliance    Rectal bleeding    Smoker    Juvenile rheumatoid arthritis ([address]    SRIRAM (obstructive sleep apnea)    Primary insomnia    Calculus of bile duct with acute on chronic cholecystitis    Mild intermittent asthma without complication    Recurrent major depressive disorder (HCC)    Gastritis    Generalized abdominal pain    Anemia    Elevated liver enzymes    Nausea and vomiting    Opioid type dependence, continuous ([address]     ra on steroids / nsaids     Folliculitis    chr abd pain    has had egd  Showing gastritis  To get colo  Ct  2017  Nothing acute     ? Adhesions   h/o collitis   lft nl     le sweling   appears chr related to joint inflammation      PLAN:     gi to see     doxy    ppi         Rash beter   abd pain    gi to see   still v psychotic   6/23   stable    cont same      gi to see  6/24   stable    rash better   awaiting gi eval     ra stable     6/25   abd pain still waiting for gi to see sx    gerd related      ? collitis  No diarrhea /bleeding    Cont same  ra stable  Janel García MD     abd pain better   ra sy=table   seen by gi   outpt f/u   d/c today  ARBEN ROBERTS [facility]  [facility] [address] [place] [address], [address] [place] [address].    Phone [phone]   Fax: [phone]  Answering Service: [phone]

## 2018-06-26 NOTE — PLAN OF CARE
Problem: Altered Mood, Depressive Behavior:  Goal: Able to verbalize and/or display a decrease in depressive symptoms  Able to verbalize and/or display a decrease in depressive symptoms   Outcome: Ongoing  Pt is paranoid preoccupied and guarded. He does not attend group but does accept a snack. He is focused on people trying to get into his room. He \"booby traps\" the door with bars of soap and shampoo bottles. He threatens repeatedly \"If someone comes in I'll break their jaw\", \"It will take the whole hospital to stop me\". He refuses HS seroquel so he can \"be ready\". Multiple attempts to encourage compliance are ignored. Goal: Ability to disclose and discuss suicidal ideas will improve  Ability to disclose and discuss suicidal ideas will improve   Outcome: Ongoing  Pt denies si and verbally agrees to remain safe while on the unit.  No self harming behaviors are noted this shift

## 2018-06-26 NOTE — CARE COORDINATION
Bridge Appointment completed: Reviewed Discharge Instructions with patient. Patient verbalizes understanding and agreement with the discharge plan using the teachback method.        Discharge Arrangements: Dr. Kolby Andre 1 pm 6/28     Guardian notified: NA   Discharge destination/address: [address] [place] [address] by:  Kris Burciaga

## 2018-06-27 PROCEDURE — 1800000000 HC LEAVE OF ABSENCE

## 2018-06-28 ENCOUNTER — HOSPITAL ENCOUNTER (EMERGENCY)
Age: 28
Discharge: HOME | End: 2018-06-28
Attending: EMERGENCY MEDICINE
Payer: MEDICARE

## 2018-06-28 ENCOUNTER — APPOINTMENT (OUTPATIENT)
Dept: CT IMAGING | Age: 28
End: 2018-06-28
Payer: MEDICARE

## 2018-06-28 ENCOUNTER — HOSPITAL ENCOUNTER (INPATIENT)
Age: 28
LOS: 4 days | Discharge: HOME | DRG: 885 | End: 2018-07-02
Attending: PSYCHIATRY & NEUROLOGY | Admitting: PSYCHIATRY & NEUROLOGY
Payer: MEDICARE

## 2018-06-28 VITALS
BODY MASS INDEX: 33.34 KG/M2 | WEIGHT: 220 LBS | RESPIRATION RATE: 16 BRPM | SYSTOLIC BLOOD PRESSURE: 130 MMHG | DIASTOLIC BLOOD PRESSURE: 72 MMHG | TEMPERATURE: 98.2 F | HEIGHT: 68 IN | OXYGEN SATURATION: 98 % | HEART RATE: 81 BPM

## 2018-06-28 DIAGNOSIS — S39.012A BACK STRAIN, INITIAL ENCOUNTER: Primary | ICD-10-CM

## 2018-06-28 PROBLEM — F31.9 BIPOLAR 1 DISORDER (HCC): Status: ACTIVE | Noted: 2018-06-28

## 2018-06-28 LAB
ABSOLUTE EOS #: 0.13 K/UL (ref 0–0.44)
ABSOLUTE IMMATURE GRANULOCYTE: <0.03 K/UL (ref 0–0.3)
ABSOLUTE LYMPH #: 2.18 K/UL (ref 1.1–3.7)
ABSOLUTE MONO #: 0.64 K/UL (ref 0.1–1.2)
ALBUMIN SERPL-MCNC: 3.7 G/DL (ref 3.5–5.2)
ALBUMIN/GLOBULIN RATIO: 1.2 (ref 1–2.5)
ALP BLD-CCNC: 60 U/L (ref 40–129)
ALT SERPL-CCNC: 55 U/L (ref 5–41)
ANION GAP SERPL CALCULATED.3IONS-SCNC: 9 MMOL/L (ref 9–17)
AST SERPL-CCNC: 64 U/L
BASOPHILS # BLD: 0 % (ref 0–2)
BASOPHILS ABSOLUTE: <0.03 K/UL (ref 0–0.2)
BILIRUB SERPL-MCNC: 0.16 MG/DL (ref 0.3–1.2)
BUN BLDV-MCNC: 11 MG/DL (ref 6–20)
BUN/CREAT BLD: ABNORMAL (ref 9–20)
CALCIUM SERPL-MCNC: 8.5 MG/DL (ref 8.6–10.4)
CHLORIDE BLD-SCNC: 102 MMOL/L (ref 98–107)
CO2: 25 MMOL/L (ref 20–31)
CREAT SERPL-MCNC: 0.79 MG/DL (ref 0.7–1.2)
DIFFERENTIAL TYPE: ABNORMAL
EOSINOPHILS RELATIVE PERCENT: 2 % (ref 1–4)
GFR AFRICAN AMERICAN: >60 ML/MIN
GFR NON-AFRICAN AMERICAN: >60 ML/MIN
GFR SERPL CREATININE-BSD FRML MDRD: ABNORMAL ML/MIN/{1.73_M2}
GFR SERPL CREATININE-BSD FRML MDRD: ABNORMAL ML/MIN/{1.73_M2}
GLUCOSE BLD-MCNC: 95 MG/DL (ref 70–99)
HCT VFR BLD CALC: 37 % (ref 40.7–50.3)
HEMOGLOBIN: 11.8 G/DL (ref 13–17)
IMMATURE GRANULOCYTES: 0 %
LIPASE: 21 U/L (ref 13–60)
LYMPHOCYTES # BLD: 38 % (ref 24–43)
MCH RBC QN AUTO: 29.4 PG (ref 25.2–33.5)
MCHC RBC AUTO-ENTMCNC: 31.9 G/DL (ref 28.4–34.8)
MCV RBC AUTO: 92.3 FL (ref 82.6–102.9)
MONOCYTES # BLD: 11 % (ref 3–12)
NRBC AUTOMATED: 0 PER 100 WBC
PDW BLD-RTO: 13 % (ref 11.8–14.4)
PLATELET # BLD: 203 K/UL (ref 138–453)
PLATELET ESTIMATE: ABNORMAL
PMV BLD AUTO: 10 FL (ref 8.1–13.5)
POTASSIUM SERPL-SCNC: 4.5 MMOL/L (ref 3.7–5.3)
RBC # BLD: 4.01 M/UL (ref 4.21–5.77)
RBC # BLD: ABNORMAL 10*6/UL
SEG NEUTROPHILS: 49 % (ref 36–65)
SEGMENTED NEUTROPHILS ABSOLUTE COUNT: 2.85 K/UL (ref 1.5–8.1)
SODIUM BLD-SCNC: 136 MMOL/L (ref 135–144)
TOTAL PROTEIN: 6.9 G/DL (ref 6.4–8.3)
WBC # BLD: 5.8 K/UL (ref 3.5–11.3)
WBC # BLD: ABNORMAL 10*3/UL

## 2018-06-28 PROCEDURE — 83690 ASSAY OF LIPASE: CPT

## 2018-06-28 PROCEDURE — 6360000002 HC RX W HCPCS: Performed by: PSYCHIATRY & NEUROLOGY

## 2018-06-28 PROCEDURE — 80053 COMPREHEN METABOLIC PANEL: CPT

## 2018-06-28 PROCEDURE — 99284 EMERGENCY DEPT VISIT MOD MDM: CPT

## 2018-06-28 PROCEDURE — 94664 DEMO&/EVAL PT USE INHALER: CPT

## 2018-06-28 PROCEDURE — 1240000000 HC EMOTIONAL WELLNESS R&B

## 2018-06-28 PROCEDURE — 6370000000 HC RX 637 (ALT 250 FOR IP): Performed by: PSYCHIATRY & NEUROLOGY

## 2018-06-28 PROCEDURE — 6370000000 HC RX 637 (ALT 250 FOR IP): Performed by: EMERGENCY MEDICINE

## 2018-06-28 PROCEDURE — 72125 CT NECK SPINE W/O DYE: CPT

## 2018-06-28 PROCEDURE — 96374 THER/PROPH/DIAG INJ IV PUSH: CPT

## 2018-06-28 PROCEDURE — 85025 COMPLETE CBC W/AUTO DIFF WBC: CPT

## 2018-06-28 PROCEDURE — 74176 CT ABD & PELVIS W/O CONTRAST: CPT

## 2018-06-28 PROCEDURE — 72128 CT CHEST SPINE W/O DYE: CPT

## 2018-06-28 PROCEDURE — 72131 CT LUMBAR SPINE W/O DYE: CPT

## 2018-06-28 PROCEDURE — 6360000002 HC RX W HCPCS: Performed by: EMERGENCY MEDICINE

## 2018-06-28 RX ORDER — DULOXETIN HYDROCHLORIDE 30 MG/1
30 CAPSULE, DELAYED RELEASE ORAL DAILY
Status: DISCONTINUED | OUTPATIENT
Start: 2018-06-28 | End: 2018-07-02 | Stop reason: HOSPADM

## 2018-06-28 RX ORDER — FLUTICASONE FUROATE AND VILANTEROL 200; 25 UG/1; UG/1
1 POWDER RESPIRATORY (INHALATION) DAILY
Status: DISCONTINUED | OUTPATIENT
Start: 2018-06-28 | End: 2018-06-28 | Stop reason: CLARIF

## 2018-06-28 RX ORDER — ALBUTEROL SULFATE 90 UG/1
2 AEROSOL, METERED RESPIRATORY (INHALATION) EVERY 6 HOURS PRN
Status: DISCONTINUED | OUTPATIENT
Start: 2018-06-28 | End: 2018-07-02 | Stop reason: HOSPADM

## 2018-06-28 RX ORDER — KETOROLAC TROMETHAMINE 15 MG/ML
15 INJECTION, SOLUTION INTRAMUSCULAR; INTRAVENOUS ONCE
Status: COMPLETED | OUTPATIENT
Start: 2018-06-28 | End: 2018-06-28

## 2018-06-28 RX ORDER — ERGOCALCIFEROL 1.25 MG/1
50000 CAPSULE ORAL WEEKLY
Status: DISCONTINUED | OUTPATIENT
Start: 2018-06-28 | End: 2018-07-02 | Stop reason: HOSPADM

## 2018-06-28 RX ORDER — DIPHENHYDRAMINE HCL 25 MG
25 TABLET ORAL ONCE
Status: COMPLETED | OUTPATIENT
Start: 2018-06-28 | End: 2018-06-28

## 2018-06-28 RX ORDER — DOXYCYCLINE 100 MG/1
100 CAPSULE ORAL EVERY 12 HOURS SCHEDULED
Status: DISCONTINUED | OUTPATIENT
Start: 2018-06-28 | End: 2018-07-02 | Stop reason: HOSPADM

## 2018-06-28 RX ORDER — CYCLOBENZAPRINE HCL 10 MG
10 TABLET ORAL 3 TIMES DAILY PRN
Qty: 30 TABLET | Refills: 0 | Status: ON HOLD | OUTPATIENT
Start: 2018-06-28 | End: 2018-07-02 | Stop reason: HOSPADM

## 2018-06-28 RX ORDER — PANTOPRAZOLE SODIUM 40 MG/1
40 TABLET, DELAYED RELEASE ORAL DAILY
Status: DISCONTINUED | OUTPATIENT
Start: 2018-06-28 | End: 2018-07-02 | Stop reason: HOSPADM

## 2018-06-28 RX ORDER — METHADONE HYDROCHLORIDE 10 MG/5ML
100 SOLUTION ORAL DAILY
Status: DISCONTINUED | OUTPATIENT
Start: 2018-06-28 | End: 2018-07-02 | Stop reason: HOSPADM

## 2018-06-28 RX ORDER — GABAPENTIN 100 MG/1
100 CAPSULE ORAL 3 TIMES DAILY
Status: DISCONTINUED | OUTPATIENT
Start: 2018-06-28 | End: 2018-07-02 | Stop reason: HOSPADM

## 2018-06-28 RX ORDER — IBUPROFEN 800 MG/1
800 TABLET ORAL EVERY 8 HOURS PRN
Qty: 30 TABLET | Refills: 0 | Status: ON HOLD | OUTPATIENT
Start: 2018-06-28 | End: 2018-07-02 | Stop reason: HOSPADM

## 2018-06-28 RX ORDER — PROMETHAZINE HYDROCHLORIDE 25 MG/1
25 TABLET ORAL EVERY 6 HOURS PRN
Status: DISCONTINUED | OUTPATIENT
Start: 2018-06-28 | End: 2018-07-02 | Stop reason: HOSPADM

## 2018-06-28 RX ORDER — SUCRALFATE 1 G/1
1 TABLET ORAL 4 TIMES DAILY
Status: DISCONTINUED | OUTPATIENT
Start: 2018-06-28 | End: 2018-07-02 | Stop reason: HOSPADM

## 2018-06-28 RX ORDER — DOXYCYCLINE 100 MG/1
100 CAPSULE ORAL EVERY 12 HOURS SCHEDULED
Status: DISCONTINUED | OUTPATIENT
Start: 2018-06-28 | End: 2018-06-28

## 2018-06-28 RX ORDER — CYCLOBENZAPRINE HCL 10 MG
10 TABLET ORAL 3 TIMES DAILY PRN
Status: DISCONTINUED | OUTPATIENT
Start: 2018-06-28 | End: 2018-07-02 | Stop reason: HOSPADM

## 2018-06-28 RX ORDER — QUETIAPINE FUMARATE 200 MG/1
400 TABLET, FILM COATED ORAL NIGHTLY
Status: DISCONTINUED | OUTPATIENT
Start: 2018-06-28 | End: 2018-07-02 | Stop reason: HOSPADM

## 2018-06-28 RX ORDER — NICOTINE 21 MG/24HR
1 PATCH, TRANSDERMAL 24 HOURS TRANSDERMAL DAILY
Status: DISCONTINUED | OUTPATIENT
Start: 2018-06-28 | End: 2018-06-30

## 2018-06-28 RX ADMIN — QUETIAPINE FUMARATE 400 MG: 200 TABLET ORAL at 23:05

## 2018-06-28 RX ADMIN — DOXYCYCLINE 100 MG: 100 CAPSULE ORAL at 21:38

## 2018-06-28 RX ADMIN — SUCRALFATE 1 G: 1 TABLET ORAL at 21:39

## 2018-06-28 RX ADMIN — PROMETHAZINE HYDROCHLORIDE 25 MG: 25 TABLET ORAL at 19:37

## 2018-06-28 RX ADMIN — CYCLOBENZAPRINE HYDROCHLORIDE 10 MG: 10 TABLET, FILM COATED ORAL at 21:39

## 2018-06-28 RX ADMIN — DIPHENHYDRAMINE HCL 25 MG: 25 TABLET ORAL at 09:59

## 2018-06-28 RX ADMIN — GABAPENTIN 100 MG: 100 CAPSULE ORAL at 21:39

## 2018-06-28 RX ADMIN — ERGOCALCIFEROL 50000 UNITS: 1.25 CAPSULE ORAL at 21:42

## 2018-06-28 RX ADMIN — KETOROLAC TROMETHAMINE 15 MG: 15 INJECTION, SOLUTION INTRAMUSCULAR; INTRAVENOUS at 09:11

## 2018-06-28 ASSESSMENT — ENCOUNTER SYMPTOMS
PHOTOPHOBIA: 0
VOMITING: 0
WHEEZING: 0
NAUSEA: 0
BACK PAIN: 1
SHORTNESS OF BREATH: 0
COUGH: 0
ABDOMINAL PAIN: 1
CHEST TIGHTNESS: 0
DIARRHEA: 0
FACIAL SWELLING: 0
BLOOD IN STOOL: 0
SORE THROAT: 0

## 2018-06-28 ASSESSMENT — PATIENT HEALTH QUESTIONNAIRE - PHQ9: SUM OF ALL RESPONSES TO PHQ QUESTIONS 1-9: 27

## 2018-06-28 ASSESSMENT — SLEEP AND FATIGUE QUESTIONNAIRES
SLEEP PATTERN: DIFFICULTY FALLING ASLEEP;DIFFICULTY ARISING;RESTLESSNESS
DIFFICULTY FALLING ASLEEP: YES
RESTFUL SLEEP: NO
DIFFICULTY STAYING ASLEEP: YES
DIFFICULTY ARISING: YES
AVERAGE NUMBER OF SLEEP HOURS: 0
DO YOU HAVE DIFFICULTY SLEEPING: YES
DO YOU USE A SLEEP AID: YES

## 2018-06-28 ASSESSMENT — PAIN SCALES - GENERAL
PAINLEVEL_OUTOF10: 4
PAINLEVEL_OUTOF10: 10
PAINLEVEL_OUTOF10: 10

## 2018-06-28 ASSESSMENT — PAIN DESCRIPTION - DESCRIPTORS
DESCRIPTORS: CONSTANT
DESCRIPTORS: CONSTANT

## 2018-06-28 ASSESSMENT — PAIN DESCRIPTION - PROGRESSION
CLINICAL_PROGRESSION: NOT CHANGED
CLINICAL_PROGRESSION: NOT CHANGED

## 2018-06-28 ASSESSMENT — LIFESTYLE VARIABLES: HISTORY_ALCOHOL_USE: NO

## 2018-06-28 ASSESSMENT — PAIN DESCRIPTION - LOCATION
LOCATION: BACK
LOCATION: KNEE

## 2018-06-28 ASSESSMENT — PAIN DESCRIPTION - PAIN TYPE
TYPE: ACUTE PAIN
TYPE: CHRONIC PAIN

## 2018-06-28 ASSESSMENT — PAIN DESCRIPTION - ORIENTATION: ORIENTATION: LOWER;MID;UPPER

## 2018-06-28 ASSESSMENT — PAIN DESCRIPTION - FREQUENCY
FREQUENCY: CONTINUOUS
FREQUENCY: CONTINUOUS

## 2018-06-28 ASSESSMENT — PAIN DESCRIPTION - ONSET
ONSET: SUDDEN
ONSET: ON-GOING

## 2018-06-28 NOTE — BH NOTE
Patient was admitted through the [facility] with complaints of increasing depression and suicidal ideation.   Detailed note dictated    Mental Status Examination:    Appearance: grooming:alert, cooperative, severe distress  Motor Activity:psychomotor activity: slow  Speech:rate/volume:delayed, increased latency of response, normal pitch and normal volume  Attitude: cooperative with interview  Affect: labile  Mood: dysphoric  Thought Processes: linear goal directed  Thought Content: ideas of helplessness and hopelessness  Suicidal Ideation: He has suicidal thoughts and plans to overdose on medication  Homicidal Ideation: patient denies  Perceptions: Denies  Insight/Judgment: impaired  Cognition: Alert, oriented to person, place, time, and situation; immediate recall 3/3 and delayed recall 3/3; concentration mildly impaired; it is evident during interview that patient's fund of knowledge is average; no language deficit noted  Axis I : Bipolar disorder current episode depressed, Opiate dependence  Axis II None  Axis III Arhritis  Axis IV Severe  Axis V 30  Tx plan: Safe therapeutic milieu  Medications as listed   Current Facility-Administered Medications   Medication Dose Route Frequency Provider Last Rate Last Dose    promethazine (PHENERGAN) tablet 25 mg  25 mg Oral Q6H PRN Oral PINK MD        gabapentin (NEURONTIN) capsule 100 mg  100 mg Oral TID Ana Jacobo MD        doxycycline monohydrate (MONODOX) capsule 100 mg  100 mg Oral 2 times per day Ana Jacobo MD         @LABSpecle@    [facility]  Encourage to attend groups  Supportive therapy  ELOS: 5-7 days      Electronically signed by Marivel Boateng MD on 6/28/2018 at 6:17 PM

## 2018-06-28 NOTE — BH NOTE
Patient given quit line phone number [phone] at this time, refusing to call at this time, states \" I just don't want to quit now\"-  dangers of longterm tobacco use discussed. staff will continue to reinforce importance of smoking cessation.

## 2018-06-28 NOTE — BH NOTE
`[facility]  Admission Note     Admission Type:   Admission Type: Voluntary    Reason for admission:  Reason for Admission: depression,  feelings that he wishes he would die,  Hi towards noone imperticuliar,  no sleep for 2 days    PATIENT STRENGTHS:  Strengths: Positive Support, Medication Compliance, Connection to output provider    Patient Strengths and Limitations:  Limitations: Tendency to isolate self, Difficult relationships / poor social skills    Addictive Behavior:   Addictive Behavior  In the past 3 months, have you felt or has someone told you that you have a problem with:  : None  Do you have a history of Chemical Use?: No  Do you have a history of Alcohol Use?: No  Do you have a history of Street Drug Abuse?: No  Histroy of Prescripton Drug Abuse?: No    Medical Problems:   Past Medical History:   Diagnosis Date    Anxiety     Arthritis     Asthma     Bipolar I disorder, most recent episode (or current) depressed, unspecified 9/12/2014    Clostridium difficile infection     COPD (chronic obstructive pulmonary disease) ([address]     Depression     Disease of blood and blood forming organ     Eczema     Fracture, metacarpal     R 4th and 5th    Gastric ulcer     Gastritis 06/13/2018    GERD (gastroesophageal reflux disease)     GI bleed     H. 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[address]     Substance abuse     Suicidal ideation     Suicide attempt by hanging ([address]     Tobacco dependence     Ulcerative colitis ([address]     UTI (urinary tract infection)        Status EXAM:  Status and Exam  Normal: No  Facial Expression: Expressionless, Worried  Affect: Blunt  Level of Consciousness: Alert  Mood:Normal: No  Mood: Depressed, Anxious  Motor Activity:Normal: Yes  Interview Behavior: Cooperative  Preception: [place] to Person, [place] to Time, [place] to Place, [place] to Situation  Attention:Normal: No  Attention: Distractible  Thought Processes: Circumstantial  Thought Content:Normal: No  Thought Content: Preoccupations, Paranoia, Phobias  Hallucinations: Auditory (Comment)  Delusions: Yes  Delusions: Persecution, Obsessions  Memory:Normal: No  Memory: Confabulation  Insight and Judgment: No  Insight and Judgment: Poor Judgment, Poor Insight  Present Suicidal Ideation: Yes  Present Homicidal Ideation: Yes    Tobacco Screening:  Practical Counseling, on admission, reuben X, if applicable and completed (first 3 are required if patient doesn't refuse):            ( )  Recognizing danger situations (included triggers and roadblocks)                    ( )  Coping skills (new ways to manage stress, exercise, relaxation techniques, changing routine, distraction)                                                           ( )  Basic information about quitting (benefits of quitting, techniques in how to quit, available resources  ( ) Referral for counseling faxed to [facility]                                  (x ) Patient refused counseling  ( ) Patient has not smoked in the last 30 days    Metabolic Screening:    Lab Results   Component Value Date    LABA1C 5.7 10/23/2014       No results for input(s): CHOL, TRIG, HDL, LDLCALC, LABVLDL in the last 72 hours. Body mass index is 32.49 kg/m². BP Readings from Last 2 Encounters:   06/28/18 137/81   06/28/18 130/72           Pt admitted with followings belongings:  Dentures: None  Vision - Corrective Lenses: None  Hearing Aid: None  Jewelry: None  Body Piercings Removed: N/A  Clothing:  Footwear, Pants, Shirt (hoodie)  Were All Patient Medications Collected?: Yes  Other Valuables: Cell phone, Classie Desi (tablet, wallet, )     Valuables placed in safe in security envelope, number:  [phone] . Patient's home medications were placed in HUB safe. Patient oriented to surroundings and program expectations and copy of patient rights given. Received admission packet:  given to patient. Consents reviewed, signed yes. Patient verbalize understanding:  yes.     Patient education on precautions: yes                   Debbi Valentine RN

## 2018-06-29 PROCEDURE — 6370000000 HC RX 637 (ALT 250 FOR IP): Performed by: PSYCHIATRY & NEUROLOGY

## 2018-06-29 PROCEDURE — 6360000002 HC RX W HCPCS: Performed by: PSYCHIATRY & NEUROLOGY

## 2018-06-29 PROCEDURE — 1240000000 HC EMOTIONAL WELLNESS R&B

## 2018-06-29 RX ADMIN — PROMETHAZINE HYDROCHLORIDE 25 MG: 25 TABLET ORAL at 18:24

## 2018-06-29 RX ADMIN — CYCLOBENZAPRINE HYDROCHLORIDE 10 MG: 10 TABLET, FILM COATED ORAL at 10:00

## 2018-06-29 RX ADMIN — METHADONE HYDROCHLORIDE 100 MG: 10 SOLUTION ORAL at 10:00

## 2018-06-29 RX ADMIN — PROMETHAZINE HYDROCHLORIDE 25 MG: 25 TABLET ORAL at 10:00

## 2018-06-29 RX ADMIN — GABAPENTIN 100 MG: 100 CAPSULE ORAL at 13:49

## 2018-06-29 RX ADMIN — SUCRALFATE 1 G: 1 TABLET ORAL at 13:49

## 2018-06-29 RX ADMIN — GABAPENTIN 100 MG: 100 CAPSULE ORAL at 10:00

## 2018-06-29 RX ADMIN — DOXYCYCLINE 100 MG: 100 CAPSULE ORAL at 10:00

## 2018-06-29 RX ADMIN — PANTOPRAZOLE SODIUM 40 MG: 40 TABLET, DELAYED RELEASE ORAL at 10:00

## 2018-06-29 ASSESSMENT — PAIN SCALES - GENERAL
PAINLEVEL_OUTOF10: 0

## 2018-06-29 NOTE — CARE COORDINATION
Writer was unable to meet with pt to complete initial psychosocial assessment. When writer went into pt's room, he was sleeping, he tried to sit up to answer writer's questions and fell asleep sitting up. Writer asked pt if he wanted writer to come back later and he stated yes. Writer attempted again, and pt was still asleep. SW will attempt at a later time.
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
## 2018-06-30 PROCEDURE — 6370000000 HC RX 637 (ALT 250 FOR IP): Performed by: PSYCHIATRY & NEUROLOGY

## 2018-06-30 PROCEDURE — 1240000000 HC EMOTIONAL WELLNESS R&B

## 2018-06-30 PROCEDURE — 6360000002 HC RX W HCPCS: Performed by: PSYCHIATRY & NEUROLOGY

## 2018-06-30 RX ORDER — ACETAMINOPHEN 325 MG/1
325 TABLET ORAL 2 TIMES DAILY PRN
Status: DISCONTINUED | OUTPATIENT
Start: 2018-06-30 | End: 2018-07-02 | Stop reason: HOSPADM

## 2018-06-30 RX ORDER — NICOTINE 21 MG/24HR
1 PATCH, TRANSDERMAL 24 HOURS TRANSDERMAL DAILY
Status: DISCONTINUED | OUTPATIENT
Start: 2018-06-30 | End: 2018-07-02 | Stop reason: HOSPADM

## 2018-06-30 RX ORDER — DOCUSATE SODIUM 100 MG/1
100 CAPSULE, LIQUID FILLED ORAL 2 TIMES DAILY
Status: DISCONTINUED | OUTPATIENT
Start: 2018-06-30 | End: 2018-07-02 | Stop reason: HOSPADM

## 2018-06-30 RX ADMIN — ACETAMINOPHEN 325 MG: 325 TABLET, FILM COATED ORAL at 09:46

## 2018-06-30 RX ADMIN — CYCLOBENZAPRINE HYDROCHLORIDE 10 MG: 10 TABLET, FILM COATED ORAL at 12:01

## 2018-06-30 RX ADMIN — CYCLOBENZAPRINE HYDROCHLORIDE 10 MG: 10 TABLET, FILM COATED ORAL at 18:31

## 2018-06-30 RX ADMIN — GABAPENTIN 100 MG: 100 CAPSULE ORAL at 14:44

## 2018-06-30 RX ADMIN — GABAPENTIN 100 MG: 100 CAPSULE ORAL at 09:47

## 2018-06-30 RX ADMIN — PROMETHAZINE HYDROCHLORIDE 25 MG: 25 TABLET ORAL at 00:26

## 2018-06-30 RX ADMIN — MAGNESIUM HYDROXIDE 30 ML: 400 SUSPENSION ORAL at 09:45

## 2018-06-30 RX ADMIN — DOXYCYCLINE 100 MG: 100 CAPSULE ORAL at 09:45

## 2018-06-30 RX ADMIN — QUETIAPINE FUMARATE 400 MG: 200 TABLET ORAL at 23:37

## 2018-06-30 RX ADMIN — METHADONE HYDROCHLORIDE 100 MG: 10 SOLUTION ORAL at 09:48

## 2018-06-30 RX ADMIN — GABAPENTIN 100 MG: 100 CAPSULE ORAL at 23:37

## 2018-06-30 RX ADMIN — DOXYCYCLINE 100 MG: 100 CAPSULE ORAL at 23:36

## 2018-06-30 RX ADMIN — CYCLOBENZAPRINE HYDROCHLORIDE 10 MG: 10 TABLET, FILM COATED ORAL at 00:26

## 2018-06-30 RX ADMIN — PROMETHAZINE HYDROCHLORIDE 25 MG: 25 TABLET ORAL at 18:30

## 2018-06-30 RX ADMIN — PROMETHAZINE HYDROCHLORIDE 25 MG: 25 TABLET ORAL at 12:01

## 2018-06-30 ASSESSMENT — PAIN SCALES - GENERAL
PAINLEVEL_OUTOF10: 0
PAINLEVEL_OUTOF10: 3
PAINLEVEL_OUTOF10: 0
PAINLEVEL_OUTOF10: 0

## 2018-06-30 NOTE — BH NOTE
Pt was encouraged to attend evening wrap up group at 8:00pm-8:30pm and relaxation group at 8:30pm-8:40pm but pt declined. Will continue to encourage pt to attend groups.

## 2018-07-01 PROCEDURE — 1240000000 HC EMOTIONAL WELLNESS R&B

## 2018-07-01 PROCEDURE — 6360000002 HC RX W HCPCS: Performed by: PSYCHIATRY & NEUROLOGY

## 2018-07-01 PROCEDURE — 6370000000 HC RX 637 (ALT 250 FOR IP): Performed by: PSYCHIATRY & NEUROLOGY

## 2018-07-01 RX ADMIN — PROMETHAZINE HYDROCHLORIDE 25 MG: 25 TABLET ORAL at 01:03

## 2018-07-01 RX ADMIN — QUETIAPINE FUMARATE 400 MG: 200 TABLET ORAL at 22:36

## 2018-07-01 RX ADMIN — MAGNESIUM HYDROXIDE 30 ML: 400 SUSPENSION ORAL at 22:35

## 2018-07-01 RX ADMIN — DOXYCYCLINE 100 MG: 100 CAPSULE ORAL at 10:15

## 2018-07-01 RX ADMIN — CYCLOBENZAPRINE HYDROCHLORIDE 10 MG: 10 TABLET, FILM COATED ORAL at 22:37

## 2018-07-01 RX ADMIN — CYCLOBENZAPRINE HYDROCHLORIDE 10 MG: 10 TABLET, FILM COATED ORAL at 01:03

## 2018-07-01 RX ADMIN — ACETAMINOPHEN 325 MG: 325 TABLET, FILM COATED ORAL at 10:13

## 2018-07-01 RX ADMIN — CYCLOBENZAPRINE HYDROCHLORIDE 10 MG: 10 TABLET, FILM COATED ORAL at 16:36

## 2018-07-01 RX ADMIN — ACETAMINOPHEN 325 MG: 325 TABLET, FILM COATED ORAL at 01:03

## 2018-07-01 RX ADMIN — DOXYCYCLINE 100 MG: 100 CAPSULE ORAL at 22:37

## 2018-07-01 RX ADMIN — PROMETHAZINE HYDROCHLORIDE 25 MG: 25 TABLET ORAL at 10:15

## 2018-07-01 RX ADMIN — PROMETHAZINE HYDROCHLORIDE 25 MG: 25 TABLET ORAL at 22:37

## 2018-07-01 RX ADMIN — ACETAMINOPHEN 325 MG: 325 TABLET, FILM COATED ORAL at 22:35

## 2018-07-01 RX ADMIN — PROMETHAZINE HYDROCHLORIDE 25 MG: 25 TABLET ORAL at 16:36

## 2018-07-01 RX ADMIN — GABAPENTIN 100 MG: 100 CAPSULE ORAL at 10:14

## 2018-07-01 RX ADMIN — METHADONE HYDROCHLORIDE 100 MG: 10 SOLUTION ORAL at 11:34

## 2018-07-01 RX ADMIN — GABAPENTIN 100 MG: 100 CAPSULE ORAL at 22:37

## 2018-07-01 RX ADMIN — CYCLOBENZAPRINE HYDROCHLORIDE 10 MG: 10 TABLET, FILM COATED ORAL at 10:16

## 2018-07-01 ASSESSMENT — PAIN SCALES - GENERAL
PAINLEVEL_OUTOF10: 0
PAINLEVEL_OUTOF10: 3
PAINLEVEL_OUTOF10: 3
PAINLEVEL_OUTOF10: 6
PAINLEVEL_OUTOF10: 1
PAINLEVEL_OUTOF10: 5
PAINLEVEL_OUTOF10: 1
PAINLEVEL_OUTOF10: 2

## 2018-07-01 NOTE — H&P
MG tablet Take 2 tablets by mouth daily 30 tablet 0    sucralfate (CARAFATE) 1 GM tablet Take 1 tablet by mouth 4 times daily 40 tablet 0    vitamin D (ERGOCALCIFEROL) 51315 units CAPS capsule Take 1 capsule by mouth once a week 4 capsule 0    albuterol sulfate HFA (VENTOLIN HFA) 108 (90 Base) MCG/ACT inhaler Inhale 2 puffs into the lungs every 6 hours as needed for Wheezing 18 g 0    Fluticasone Furoate-Vilanterol (BREO ELLIPTA) 200-25 MCG/INH AEPB Inhale 1 puff into the lungs daily 1 each 0    methadone 10 MG/5ML solution Take 100 mg by mouth daily. .                        General health:  Patient states health is pretty good He feels depressed about life and has achronic pain issues,                  Skin:  No contusions or abrasions from fall     Head, eyes, ears, nose, throat neck :  No routine headaches, No concussions, No hearing loss        Neck:  No pain, stiffness or masses. Cardiovascular/Respiratory system:   Has no chest pain or cough or contageous lung diseases               Gastrointestinal tract:  Has intermittent diarrhea, Dr Ollie Geller is going to do colonoscopy July 3rd, He had had C Diff 3 x in the last few months,  Had H Pylori, Intermittent Abd pain, has had blood in stools from C Dif     Genitourinary:  Currently has no UTI sx or pain with voiding. Locomotor:  Lumbar back pain is chronic Rt knee and ankle sprain are recent, Walks with no limp       Neuropsychiatric:  Depresson and SI ideation     See HPI. Depression    GENERAL PHYSICAL EXAM:         Vitals: BP (!) 144/100   Pulse 78   Temp 98.2 °F (36.8 °C) (Oral)   Resp 14   Ht 5' 9\" (1.753 m)   Wt 220 lb (99.8 kg)   BMI 32.49 kg/m²  Body mass index is 32.49 kg/m². GENERAL APPEARANCE:  Raquel Weems is 32 y.o.,  male, moderately obese, nourished, conscious, alert. Does not appear to be distress or pain at this time. SKIN:  Warm, dry, no cyanosis or jaundice. Has no rashes.      HEAD:

## 2018-07-02 VITALS
DIASTOLIC BLOOD PRESSURE: 73 MMHG | SYSTOLIC BLOOD PRESSURE: 111 MMHG | WEIGHT: 220 LBS | BODY MASS INDEX: 32.58 KG/M2 | HEIGHT: 69 IN | TEMPERATURE: 97.5 F | RESPIRATION RATE: 14 BRPM | HEART RATE: 113 BPM

## 2018-07-02 PROBLEM — F31.9 BIPOLAR 1 DISORDER (HCC): Status: RESOLVED | Noted: 2018-06-28 | Resolved: 2018-07-02

## 2018-07-02 PROCEDURE — 6370000000 HC RX 637 (ALT 250 FOR IP): Performed by: PSYCHIATRY & NEUROLOGY

## 2018-07-02 PROCEDURE — 5130000000 HC BRIDGE APPOINTMENT

## 2018-07-02 RX ORDER — PANTOPRAZOLE SODIUM 40 MG/1
40 TABLET, DELAYED RELEASE ORAL DAILY
Qty: 30 TABLET | Refills: 0 | Status: ON HOLD | OUTPATIENT
Start: 2018-07-02 | End: 2018-08-13

## 2018-07-02 RX ADMIN — METHADONE HYDROCHLORIDE 100 MG: 10 SOLUTION ORAL at 10:02

## 2018-07-02 ASSESSMENT — PAIN DESCRIPTION - PROGRESSION: CLINICAL_PROGRESSION: NOT CHANGED

## 2018-07-02 ASSESSMENT — PAIN DESCRIPTION - ORIENTATION: ORIENTATION: LOWER

## 2018-07-02 ASSESSMENT — PAIN DESCRIPTION - FREQUENCY: FREQUENCY: CONTINUOUS

## 2018-07-02 ASSESSMENT — LIFESTYLE VARIABLES: HISTORY_ALCOHOL_USE: NO

## 2018-07-02 ASSESSMENT — PAIN SCALES - GENERAL
PAINLEVEL_OUTOF10: 7
PAINLEVEL_OUTOF10: 7
PAINLEVEL_OUTOF10: 6

## 2018-07-02 ASSESSMENT — PAIN DESCRIPTION - ONSET: ONSET: ON-GOING

## 2018-07-02 ASSESSMENT — PAIN DESCRIPTION - LOCATION: LOCATION: BACK

## 2018-07-02 ASSESSMENT — PAIN DESCRIPTION - PAIN TYPE: TYPE: CHRONIC PAIN

## 2018-07-02 ASSESSMENT — PAIN DESCRIPTION - DESCRIPTORS: DESCRIPTORS: CONSTANT

## 2018-07-02 NOTE — PLAN OF CARE
Problem: Altered Mood, Psychotic Behavior:  Goal: Able to demonstrate trust by eating, participating in treatment and following staff's direction  Able to demonstrate trust by eating, participating in treatment and following staff's direction   Outcome: Ongoing  PSYCHOEDUCATION GROUP NOTE    Date: June 30, 2018  Start Time: 0920  End Time: 0940    Number Participants in Group:  9/15    Goal:  Patient will demonstrate increased interpersonal interaction   Topic: Community Meeting and Goal Setting     Discipline Responsible:   OT  AT    Ns. x RT MHP Other       Participation Level:     None x Minimal    Active Listener  Interactive    Monopolizing         Participation Quality:   Appropriate  Inappropriate          Attentive        Intrusive          Sharing        Resistant          Supportive x       Lethargic       Affective:    Congruent  Incongruent  Blunted x Flat    Constricted  Anxious  Elated  Angry    Labile  Depressed  Other         Cognitive:  x Alert x Oriented PPTP     Concentration  G  F x P   Attention Span  G  F x P   Short-Term Memory  G x F  P   Long-Term Memory  G x F  P   ProblemSolving/  Decision Making  G x F  P   Ability to Process  Information  G x F  P      Contributing Factors             Delusional             Hallucinating             Flight of Ideas             Other:       Modes of Intervention:  x Education  Support  Exploration   x Clarifying x Problem Solving  Confrontation   x Socialization  Limit Setting x Reality Testing   x Activity  Movement  Media    Other:            Response to Learning:   Able to verbalize current knowledge/experience    Able to verbalize/acknowledge new learning    Able to retain information    Capable of insight    Able to change behavior   x Progressing to goal    Other:        Comments:
Problem: Altered Mood, Psychotic Behavior:  Goal: Able to demonstrate trust by eating, participating in treatment and following staff's direction  Able to demonstrate trust by eating, participating in treatment and following staff's direction   Outcome: Ongoing  Patient denies thoughts of harm to self or others. Reports nonspecific auditory hallucinations. Flat affect. Withdrawn. Paranoid. 15 minute checks maintained at irregular intervals for patient safety. Goal: Able to verbalize decrease in frequency and intensity of hallucinations  Able to verbalize decrease in frequency and intensity of hallucinations   Outcome: Ongoing  Patient denies thoughts of harm to self or others. Reports nonspecific auditory hallucinations. Flat affect. Withdrawn. Paranoid. 15 minute checks maintained at irregular intervals for patient safety.
Problem: Altered Mood, Psychotic Behavior:  Goal: Able to demonstrate trust by eating, participating in treatment and following staff's direction  Able to demonstrate trust by eating, participating in treatment and following staff's direction   Outcome: Ongoing  Patient reports he would like to be moved closer to nurses station, he would feel much safer. Patient asked if his door could be locked while he sleeps,  Staff was agreeable to allow door to be locked. Pt. Remains on q15 min checks and frequent spontaneous checks throughout shift. Pt. Safety maintained.    Goal: Able to verbalize decrease in frequency and intensity of hallucinations  Able to verbalize decrease in frequency and intensity of hallucinations   Outcome: Ongoing  Patient reports no change on voices he is hearing,  Did not want to discuss content
Problem: Altered Mood, Psychotic Behavior:  Goal: Able to demonstrate trust by eating, participating in treatment and following staff's direction  Able to demonstrate trust by eating, participating in treatment and following staff's direction   Outcome: Ongoing  Pt did not attend Community Meeting at 0900 d/t resting in room despite staff invitation to attend.
Problem: Altered Mood, Psychotic Behavior:  Goal: Able to demonstrate trust by eating, participating in treatment and following staff's direction  Able to demonstrate trust by eating, participating in treatment and following staff's direction   Outcome: Ongoing  Pt did not attend Community Meeting at 7366 d/t resting in room despite staff invitation to attend.
Problem: Altered Mood, Psychotic Behavior:  Goal: Able to demonstrate trust by eating, participating in treatment and following staff's direction  Able to demonstrate trust by eating, participating in treatment and following staff's direction   Outcome: Ongoing  Pt did not attend Therapeutic Recreation at 1330 d/t resting in room despite staff invitation to attend.
Problem: Altered Mood, Psychotic Behavior:  Goal: Able to demonstrate trust by eating, participating in treatment and following staff's direction  Able to demonstrate trust by eating, participating in treatment and following staff's direction   Outcome: Ongoing  Pt did not participate in leisure/ cognitive skills group at 1100 despite staff encouragement to attend.
Problem: Altered Mood, Psychotic Behavior:  Goal: Able to verbalize decrease in frequency and intensity of hallucinations  Able to verbalize decrease in frequency and intensity of hallucinations   Outcome: Ongoing  Pt denied thoughts of SI/HI/self-harm and agreed to seek out staff should thoughts of SI/HI/self-harm arise. Safe environment maintained. Q15 minute checks for safety continued per unit policy. Will continue to monitor for safety and provide support and reassurance as needed.
Problem: Pain:  Goal: Pain level will decrease  Pain level will decrease   Outcome: Ongoing  Pt denies pain or discomfort at this time. To utilize pain medication as needed for discomfort. Problem: Altered Mood, Psychotic Behavior:  Goal: Able to demonstrate trust by eating, participating in treatment and following staff's direction  Able to demonstrate trust by eating, participating in treatment and following staff's direction   Outcome: Ongoing  Pt is suspicious and paranoid. Pt is selective with medications. Isolative to room and aloof of peers. Pt is requesting that his room is locked d/t increased paranoia of someone coming in his room. Reassurance and support given but pt remains unsure of surroundings. Accepting of meals and fluids. Goal: Able to verbalize decrease in frequency and intensity of hallucinations  Able to verbalize decrease in frequency and intensity of hallucinations   Outcome: Ongoing  Pt reports auditory hallucinations. Does not elaborate on them. Pt falls asleep in mid sentence and mumbles during conversation. Denies SI/HI.
Problem: Pain:  Goal: Pain level will decrease  Pain level will decrease   Outcome: Ongoing  Pt requesting ordered medications for pain. Medications provided. Pt is somnolent. Goal: Control of acute pain  Control of acute pain   Outcome: Ongoing    Goal: Control of chronic pain  Control of chronic pain   Outcome: Ongoing      Problem: Altered Mood, Psychotic Behavior:  Goal: Able to demonstrate trust by eating, participating in treatment and following staff's direction  Able to demonstrate trust by eating, participating in treatment and following staff's direction   Outcome: Ongoing  Pt is controlled. Frequently at desk for medications. Admits to voices. Denies suicidal or homicidal ideations. Pt is very paranoid blocking door to room with nightstand. Booby-traps the door. Refused certain medications.    Goal: Able to verbalize decrease in frequency and intensity of hallucinations  Able to verbalize decrease in frequency and intensity of hallucinations   Outcome: Ongoing
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 Changes to plan of Care 0    Status EXAM:   Status and Exam  Normal: No  Facial Expression: Expressionless, Flat  Affect: Blunt  Level of Consciousness: Alert  Mood:Normal: No  Mood: Depressed, Anxious, Suspicious, Helpless  Motor Activity:Normal: No  Motor Activity: Decreased  Interview Behavior: Cooperative  Preception: [place] to Person, [place] to Time, [place] to Place, [place] to Situation  Attention:Normal: No  Attention: Distractible, Unable to Concentrate  Thought Processes: Circumstantial  Thought Content:Normal: No  Thought Content: Preoccupations, Poverty of Content, Paranoia  Hallucinations:  Auditory (Comment)  Delusions: Yes  Delusions: Persecution, Obsessions  Memory:Normal: No  Memory: Confabulation  Insight and Judgment: No  Insight and Judgment: Poor Judgment, Unmotivated, Unrealistic, Poor Insight  Present Suicidal Ideation: No  Present Homicidal Ideation: No    Daily Assessment Last Entry:   Daily Sleep (WDL): Exceptions to WDL         Patient Currently in Pain: Denies  Daily Nutrition (WDL): Within Defined Limits    Patient Monitoring:  Frequency of Checks: 4 times per hour, close    Psychiatric Symptoms:   Depression Symptoms  Depression Symptoms: Isolative, Loss of interest, Impaired concentration, Feelings of helplessness  Anxiety Symptoms  Anxiety Symptoms: Generalized, Social phobias, Unexplained fears  Elizabeth Symptoms  Elizabeth Symptoms: Poor judgment     Psychosis Symptoms  Delusion Type: Paranoid    Suicide Risk CSSR-S:  1) Within the past month, have you wished

## 2018-07-02 NOTE — DISCHARGE SUMMARY
Psychiatry: Discharge Note  Patient is stable. He  denies any hallucinations or delusions. He denies any suicidal or homicidal thoughts or plans. He agreed to follow up with [facility]. He verbalizes a plan to f/u with outpatient care and keep himself safe  Discharge diagnosis:  Axis I: Bipolar disorder  Axis II: None  Axis III : Arthritis, asthma  Axis IV : Lack of support  Axis V 45  Discharge Medications:   Current Discharge Medication List           Details   pantoprazole (PROTONIX) 40 MG tablet Take 1 tablet by mouth daily  Qty: 30 tablet, Refills: 0              Details   DULoxetine (CYMBALTA) 30 MG extended release capsule Take 1 capsule by mouth daily  Qty: 30 capsule, Refills: 0      QUEtiapine (SEROQUEL) 400 MG tablet Take 1 tablet by mouth nightly  Qty: 30 tablet, Refills: 0      sucralfate (CARAFATE) 1 GM tablet Take 1 tablet by mouth 4 times daily  Qty: 40 tablet, Refills: 0      vitamin D (ERGOCALCIFEROL) 87151 units CAPS capsule Take 1 capsule by mouth once a week  Qty: 4 capsule, Refills: 0    Associated Diagnoses: Juvenile rheumatoid arthritis (HCC)      albuterol sulfate HFA (VENTOLIN HFA) 108 (90 Base) MCG/ACT inhaler Inhale 2 puffs into the lungs every 6 hours as needed for Wheezing  Qty: 18 g, Refills: 0      Fluticasone Furoate-Vilanterol (BREO ELLIPTA) 200-25 MCG/INH AEPB Inhale 1 puff into the lungs daily  Qty: 1 each, Refills: 0      methadone 10 MG/5ML solution Take 100 mg by mouth daily.  .           Electronically signed by Olga Norris MD on 7/2/2018 at 8:43 AM
homicidal thoughts  or plans. He is of average intelligence. He is oriented to time, place,  and person. His memory to recent, remote, and immediate events are within  normal limits. He has adequate attention and concentration. His insight  and judgment are fair. His abstraction is fair. DIAGNOSES:  1. Bipolar disorder, current episode depressed. 2.  Opiate dependence. 3.  Rheumatoid arthritis. 4.  Gastric ulcer, currently being treated. TREATMENT AND PLAN:  The patient is discharged.   He will follow with  gastroenterologist, rheumatologist, primary care physician, and  psychiatrist.        Samy Coleman    D: 07/01/2018 17:31:21       T: 07/01/2018 22:59:41     [id number]  Job#: [id number]     Doc#: [id number]    CC:

## 2018-07-04 ENCOUNTER — HOSPITAL ENCOUNTER (EMERGENCY)
Age: 28
Discharge: HOME | End: 2018-07-04
Attending: EMERGENCY MEDICINE
Payer: MEDICARE

## 2018-07-04 VITALS
HEART RATE: 97 BPM | TEMPERATURE: 98.4 F | SYSTOLIC BLOOD PRESSURE: 144 MMHG | DIASTOLIC BLOOD PRESSURE: 86 MMHG | OXYGEN SATURATION: 99 % | RESPIRATION RATE: 14 BRPM

## 2018-07-04 VITALS
DIASTOLIC BLOOD PRESSURE: 86 MMHG | HEIGHT: 68 IN | BODY MASS INDEX: 33.34 KG/M2 | HEART RATE: 85 BPM | OXYGEN SATURATION: 96 % | RESPIRATION RATE: 18 BRPM | SYSTOLIC BLOOD PRESSURE: 147 MMHG | TEMPERATURE: 98.5 F | WEIGHT: 220 LBS

## 2018-07-04 DIAGNOSIS — M79.604 BILATERAL LOWER EXTREMITY PAIN: Primary | ICD-10-CM

## 2018-07-04 DIAGNOSIS — F32.A DEPRESSION, UNSPECIFIED DEPRESSION TYPE: ICD-10-CM

## 2018-07-04 DIAGNOSIS — G89.29 CHRONIC ABDOMINAL PAIN: Primary | ICD-10-CM

## 2018-07-04 DIAGNOSIS — R10.9 CHRONIC ABDOMINAL PAIN: Primary | ICD-10-CM

## 2018-07-04 DIAGNOSIS — M79.605 BILATERAL LOWER EXTREMITY PAIN: Primary | ICD-10-CM

## 2018-07-04 DIAGNOSIS — R07.89 ATYPICAL CHEST PAIN: ICD-10-CM

## 2018-07-04 LAB
ANION GAP SERPL CALCULATED.3IONS-SCNC: 11 MMOL/L (ref 9–17)
BILIRUBIN URINE: NEGATIVE
BUN BLDV-MCNC: 10 MG/DL (ref 6–20)
BUN/CREAT BLD: ABNORMAL (ref 9–20)
CALCIUM SERPL-MCNC: 9 MG/DL (ref 8.6–10.4)
CHLORIDE BLD-SCNC: 96 MMOL/L (ref 98–107)
CO2: 25 MMOL/L (ref 20–31)
COLOR: YELLOW
COMMENT UA: NORMAL
CREAT SERPL-MCNC: 0.71 MG/DL (ref 0.7–1.2)
EKG ATRIAL RATE: 92 BPM
EKG P AXIS: 50 DEGREES
EKG P-R INTERVAL: 152 MS
EKG Q-T INTERVAL: 366 MS
EKG QRS DURATION: 76 MS
EKG QTC CALCULATION (BAZETT): 450 MS
EKG R AXIS: 40 DEGREES
EKG T AXIS: 30 DEGREES
EKG VENTRICULAR RATE: 91 BPM
GFR AFRICAN AMERICAN: >60 ML/MIN
GFR NON-AFRICAN AMERICAN: >60 ML/MIN
GFR SERPL CREATININE-BSD FRML MDRD: ABNORMAL ML/MIN/{1.73_M2}
GFR SERPL CREATININE-BSD FRML MDRD: ABNORMAL ML/MIN/{1.73_M2}
GLUCOSE BLD-MCNC: 92 MG/DL (ref 70–99)
GLUCOSE URINE: NEGATIVE
KETONES, URINE: NEGATIVE
LEUKOCYTE ESTERASE, URINE: NEGATIVE
NITRITE, URINE: NEGATIVE
PH UA: 8 (ref 5–8)
POC TROPONIN I: 0 NG/ML (ref 0–0.1)
POC TROPONIN INTERP: NORMAL
POTASSIUM SERPL-SCNC: 4.3 MMOL/L (ref 3.7–5.3)
PROTEIN UA: NEGATIVE
SODIUM BLD-SCNC: 132 MMOL/L (ref 135–144)
SPECIFIC GRAVITY UA: 1.02 (ref 1–1.03)
TURBIDITY: CLEAR
URINE HGB: NEGATIVE
UROBILINOGEN, URINE: NORMAL

## 2018-07-04 PROCEDURE — 99285 EMERGENCY DEPT VISIT HI MDM: CPT

## 2018-07-04 PROCEDURE — 93005 ELECTROCARDIOGRAM TRACING: CPT

## 2018-07-04 PROCEDURE — 84484 ASSAY OF TROPONIN QUANT: CPT

## 2018-07-04 PROCEDURE — 81003 URINALYSIS AUTO W/O SCOPE: CPT

## 2018-07-04 PROCEDURE — 80048 BASIC METABOLIC PNL TOTAL CA: CPT

## 2018-07-04 ASSESSMENT — PAIN DESCRIPTION - LOCATION
LOCATION: CHEST
LOCATION: ABDOMEN

## 2018-07-04 ASSESSMENT — PAIN DESCRIPTION - DESCRIPTORS
DESCRIPTORS: CRAMPING;DISCOMFORT;ACHING
DESCRIPTORS: ACHING

## 2018-07-04 ASSESSMENT — PAIN DESCRIPTION - PAIN TYPE
TYPE: ACUTE PAIN
TYPE: ACUTE PAIN

## 2018-07-04 ASSESSMENT — PAIN DESCRIPTION - ORIENTATION: ORIENTATION: LEFT

## 2018-07-04 ASSESSMENT — ENCOUNTER SYMPTOMS
VOMITING: 0
NAUSEA: 0
SINUS PAIN: 0
SHORTNESS OF BREATH: 0
PHOTOPHOBIA: 0

## 2018-07-04 ASSESSMENT — PAIN DESCRIPTION - FREQUENCY: FREQUENCY: CONTINUOUS

## 2018-07-04 ASSESSMENT — PAIN SCALES - GENERAL
PAINLEVEL_OUTOF10: 9
PAINLEVEL_OUTOF10: 10

## 2018-07-04 NOTE — ED PROVIDER NOTES
Atraumatic, external ears normal, nose normal, oropharynx moist. Neck- supple    Respiratory:  Clear to auscultation bilaterally with good air exchange  Cardiovascular:  RRR with normal S1 and S2  GI:  Soft, nondistended/normal BS, and Mild diffuse tenderness without peritoneal signs  Musculoskeletal:  No edema, no tenderness, no deformities  Integument:  No rash  Neurologic:  Alert & oriented x 3, no focal deficits noted   Psychiatric: Flat affect, appears depressed, no plans of suicide      EMERGENCY DEPARTMENT COURSE     22-year-old male presents with chronic abdominal pain and suicidal thoughts. He does not appear to be actively suicidal.  He acknowledges that his suicidal thoughts are related to his chronic abdominal pain. He is afebrile and nontoxic in appearance. Vital signs within normal limits. He has some mild diffuse tenderness on abdominal exam however no peritoneal signs. Patient has multiple hospital visits for abdominal pain as well as psychiatric issues in the past several months. He is not going for the appropriate follow-up as instructed. His abdominal pain seems to be unchanged today. He has had normal lab work as of June 28 as well as a normal abdominal CT on June 28. I see no utility in getting more lab work or abdominal CTs today as patient is not having any different symptoms today. We'll have patient evaluated by  however I do not think he is appropriate for inpatient psychiatric admission. I believe he needs to follow up with GI and get an endoscopy as an outpatient as instructed secondary to chronic abdominal pain with unclear etiology at this time. 6:23 AM   has contacted on-call psychiatry and they are okay with discharge as long as we do not feel that the patient needs inpatient psychiatric care at this time. Strongly believe patient's symptoms are chronic and are related to his chronic pain.   [facility]. Patient was return of any symptoms worsen. He is agreeable to plan will be discharged home today. FINAL IMPRESSION     1. Chronic abdominal pain    2. Depression, unspecified depression type          DISPOSITION:  DISPOSITION Decision To Discharge 07/04/2018 06:19:13 AM        PATIENT REFERRED TO:  Jacqueline Bals MD  [address] [place] [address]  [phone]    Schedule an appointment as soon as possible for a visit       [facility] ED  [id number] [phone]    As needed, If symptoms worsen      DISCHARGE MEDICATIONS:  Current Discharge Medication List          (Please note that portions of this note were completed with a voice recognition program. Efforts were made to edit the dictations but occasionally words are mis-transcribed.  Whenever words are used in this note in any gender, they shall be construed as though they were used in the gender appropriate to the circumstances; and whenever words are used in this note in the singular or plural form, they shall be construed as though they were used in the form appropriate to the circumstances.)    Tasneem Zarate DO  Attending Emergency Medicine Physician        Tasneem Zarate DO  07/04/18 [id number]

## 2018-07-04 NOTE — ED NOTES
Pt report from Juan Andrew, pt moved from ED room 11 to [facility] with c/o suicidal ideations, pt denies plan but is worried that his being suicidal will interfere with his chest pain complaint.  Pt was released from [facility] 2 days ago and was seen at both [address] [place] [address] and [place] earlier today, [facility] and will [facility] to [facility] upon discharge     [address] [facility] [address], RN  07/04/18 2301
SW met with patient at bedside. Patient reported swelling of the legs and abdominal pain. SW asked patient about suicidal ideations and he reported if he said yes he would have to stay in the [facility]. SW asked patient whether or not he had a plan and patient refused to answer. Patient reported he wants Dr. Deisi Perry called as he knows he will admit him. Patient reported he was only discharged from the [facility] to receive medical treatment in which he missed his appointment for the Colonoscopy. SW informed patient worker spoke with staff at [facility] who stated once the patient has been cleared, he could return to [facility] if he wants to. Patient reported feeling sad due to the holidays. He continued to report he wanted to be moved to another room other than the [facility]. Patient was seen by Laddie Lennox and [facility] earlier for psych issues. Patient was frustrated and reported he wants to stay at [facility]. Per patient is he prescribed 600 mg Neurontin, 400 mg Seroquel, Perigean, and Prednisone. Patient reported he didn[facility] take [facility]
Depression  [] Suicide attempt      [x] Low self-esteem  [] Hallucinations      [x] Feeling of Hopelessness  [x] Substance abuse or withdrawal    [x] Dysfunctional family  [x] Major traumatic event, eg., divorce, etc   [x] Excessive stress/anxiety    7/4/18    Expected Outcomes    Patient will:   [x] Patient will remain safe for the duration of their stay   [x] Patient's environment will be safe, eg. Free of potential suicide weapons   [] Verbalize Recovery from suicidal episode and improvement in self-worth   [x] Discuss feeling that precipitated suicide attempt/thoughts/behavior   [] Will describe available resources for crisis prevention and management   [] Will verbalize positive coping skills     Nursing Intervention   [x] Assessment and Observations hourly   [x] Suicide Precautions implemented with patient, should be 1:1 observation   [x] Document observation [id number] and RN assessment hourly   [] Consult physician for:    [] Psychiatric consult    [] Pharmacological therapy    [] Other:    [x] Patient search completed by security   [x] Initiated appropriate safety protocols by removing from the patient's environment anything that could be used to inflict self injury, eg. Order safe tray, snap gown, etc   [x] Maintain open, warm, caring, non-judgmental attitude/manner towards patient   [] Discuss advantages and disadvantages of existing coping methods/skills   [x] Assist and educate patient with identifying present strengths and coping skills   [x] Keep patient informed regarding plan of care and provide clear concise explanations. Provide the patient/family education information as well as telephone numbers and other information about crisis centers, hot lines, and counselors.     Discharge Planning:   [] Referral  [] Groups [] Health agencies  [] Other:          Michelet Alarcon RN  07/04/18 [id number]

## 2018-07-04 NOTE — ED PROVIDER NOTES
medications    Medication Sig Start Date End Date Taking? Authorizing Provider   pantoprazole (PROTONIX) 40 MG tablet Take 1 tablet by mouth daily 7/2/18   Fletcher Dailey MD   DULoxetine (CYMBALTA) 30 MG extended release capsule Take 1 capsule by mouth daily 6/26/18   Fletcher Dailey MD   QUEtiapine (SEROQUEL) 400 MG tablet Take 1 tablet by mouth nightly 6/26/18   Fletcher Dailey MD   sucralfate (CARAFATE) 1 GM tablet Take 1 tablet by mouth 4 times daily 6/26/18   Fletcher Dailey MD   vitamin D (ERGOCALCIFEROL) 48328 units CAPS capsule Take 1 capsule by mouth once a week 6/26/18   Fletcher Dailey MD   albuterol sulfate HFA (VENTOLIN HFA) 108 (90 Base) MCG/ACT inhaler Inhale 2 puffs into the lungs every 6 hours as needed for Wheezing 4/13/18   Oral PINK MD   Fluticasone Furoate-Vilanterol (BREO ELLIPTA) 200-25 MCG/INH AEPB Inhale 1 puff into the lungs daily 4/13/18   Maci PINK MD   methadone 10 MG/5ML solution Take 100 mg by mouth daily. Jose Castillo Historical Provider, MD       REVIEW OF SYSTEMS    (2-9 systems for level 4, 10 or more for level 5)      Review of Systems   Constitutional: Positive for fever. Negative for chills and fatigue. HENT: Negative for congestion, ear pain and sinus pain. Eyes: Negative for photophobia and visual disturbance. Respiratory: Negative for shortness of breath. Cardiovascular: Positive for chest pain and leg swelling. Negative for palpitations. Gastrointestinal: Negative for nausea and vomiting. Genitourinary: Positive for dysuria. Musculoskeletal: Negative for neck pain and neck stiffness. Skin: Negative for rash and wound. Neurological: Negative for numbness. PHYSICAL EXAM   (up to 7 for level 4, 8 or more for level 5)      INITIAL VITALS:   BP (!) 144/86   Pulse 97   Temp 98.4 °F (36.9 °C) (Oral)   Resp 14     Physical Exam   Constitutional: He is oriented to person, place, and time.  He appears well-developed management. Pt notes no lab testing was done during these visits. Reports ongoing chest pain at this time. Notes suicidal ideation without plan. Physical exam remarkable for mildly anxious appearing 32year old male in no acute distress. Chest pain not reproducible by palpation. No lower extremity edema noted on palpation, no palpable cord. Will obtain EKG, basic labs, including single troponin as chest pain has been unchanged for multiple days. Lab results show no concerning abnormalities. No further work up at this time, with low suspicion of acute cardiac etiology of chest pain with unremarkable EKG and negative troponin. Social work spoke with patient and arranged transportation [facility] to [facility] following discharge. Denied plan on review of notes from social work. Pt remained stable throughout entirety of ED visit while under my care and was transported to [facility] by public safety. He will continue outpatient medication plan and follow up with providers as directed. Pt instructed to return to the ED if previous symptoms persist or worsen, or new symptoms arise. Pt expressed understanding and agreed with plan. PROCEDURES:  None    CONSULTS:  None    CRITICAL CARE:  None    FINAL IMPRESSION      1. Bilateral lower extremity pain    2.  Atypical chest pain          DISPOSITION / PLAN     DISPOSITION      Discharged    PATIENT REFERRED TO:  [address] [address]  [phone]  Go to       [facility] ED  [address] [address]  [phone]  Go to   If symptoms worsen      DISCHARGE MEDICATIONS:  Discharge Medication List as of 7/4/2018  5:59 PM        Sarwat Del Valle DO    Emergency Medicine Resident    (Please note that portions of this note were completed with a voice recognition program.  Efforts were made to edit the dictations but occasionally words are mis-transcribed.)     Sarwat Del Valle MD  07/07/18 1040

## 2018-07-04 NOTE — ED NOTES
Per doctor [address], pt is medically cleared and can go to [facility]. Safeguard at bedside to monitor pt. Safeguard made aware that they need to stay with pt at all times. If they need a break, they must notify RN. Safeguard verbalizes understanding . Pt belongings taken and locked by security. Pt placed in blue gown.        Susana Sterling RN  07/04/18 8392
Unable to triage pt right away due to chart lock at [facility] for outpatient procedure.      Jenn Linares RN  07/04/18 4845
admissions. Pt does not represent an imminent risk to self and others at this time. Pt appears to be malingering. Pt became upset when informed pt was going to be discharged to follow up with [facility]. SW offered to arrange a cab for pt to be transported to [facility] for an evaluation for the Crisis stabilization unit. Pt refused. DEZ encouraged pt to follow up at [facility]. Security called to [facility] to escort pt out.

## 2018-07-05 ENCOUNTER — HOSPITAL ENCOUNTER (INPATIENT)
Age: 28
LOS: 39 days | Discharge: ANOTHER HOSPITAL | DRG: 885 | End: 2018-08-13
Attending: PSYCHIATRY & NEUROLOGY | Admitting: PSYCHIATRY & NEUROLOGY
Payer: MEDICARE

## 2018-07-05 DIAGNOSIS — M08.00 JUVENILE RHEUMATOID ARTHRITIS (HCC): ICD-10-CM

## 2018-07-05 PROBLEM — F31.9 BIPOLAR 1 DISORDER (HCC): Status: ACTIVE | Noted: 2018-07-05

## 2018-07-05 PROCEDURE — 1240000000 HC EMOTIONAL WELLNESS R&B

## 2018-07-05 PROCEDURE — 6370000000 HC RX 637 (ALT 250 FOR IP): Performed by: PSYCHIATRY & NEUROLOGY

## 2018-07-05 RX ORDER — ALBUTEROL SULFATE 90 UG/1
2 AEROSOL, METERED RESPIRATORY (INHALATION) EVERY 6 HOURS PRN
Status: DISCONTINUED | OUTPATIENT
Start: 2018-07-05 | End: 2018-08-13 | Stop reason: HOSPADM

## 2018-07-05 RX ORDER — ERGOCALCIFEROL 1.25 MG/1
50000 CAPSULE ORAL WEEKLY
Status: DISCONTINUED | OUTPATIENT
Start: 2018-07-05 | End: 2018-08-13 | Stop reason: HOSPADM

## 2018-07-05 RX ORDER — PANTOPRAZOLE SODIUM 40 MG/1
40 TABLET, DELAYED RELEASE ORAL DAILY
Status: DISCONTINUED | OUTPATIENT
Start: 2018-07-05 | End: 2018-08-13 | Stop reason: HOSPADM

## 2018-07-05 RX ORDER — MAGNESIUM HYDROXIDE/ALUMINUM HYDROXICE/SIMETHICONE 120; 1200; 1200 MG/30ML; MG/30ML; MG/30ML
30 SUSPENSION ORAL 3 TIMES DAILY PRN
Status: DISCONTINUED | OUTPATIENT
Start: 2018-07-05 | End: 2018-08-13 | Stop reason: HOSPADM

## 2018-07-05 RX ORDER — FLUTICASONE FUROATE AND VILANTEROL 200; 25 UG/1; UG/1
1 POWDER RESPIRATORY (INHALATION) DAILY
Status: DISCONTINUED | OUTPATIENT
Start: 2018-07-05 | End: 2018-07-08 | Stop reason: CLARIF

## 2018-07-05 RX ORDER — METHADONE HYDROCHLORIDE 10 MG/5ML
94 SOLUTION ORAL DAILY
Status: ON HOLD | COMMUNITY
End: 2019-01-04

## 2018-07-05 RX ORDER — DULOXETIN HYDROCHLORIDE 30 MG/1
30 CAPSULE, DELAYED RELEASE ORAL DAILY
Status: DISCONTINUED | OUTPATIENT
Start: 2018-07-05 | End: 2018-07-07

## 2018-07-05 RX ORDER — LOPERAMIDE HYDROCHLORIDE 2 MG/1
2 CAPSULE ORAL 2 TIMES DAILY PRN
Status: DISCONTINUED | OUTPATIENT
Start: 2018-07-05 | End: 2018-08-13 | Stop reason: HOSPADM

## 2018-07-05 RX ORDER — QUETIAPINE FUMARATE 200 MG/1
400 TABLET, FILM COATED ORAL NIGHTLY
Status: DISCONTINUED | OUTPATIENT
Start: 2018-07-05 | End: 2018-07-19

## 2018-07-05 RX ORDER — METHADONE HYDROCHLORIDE 10 MG/5ML
94 SOLUTION ORAL DAILY
Status: DISCONTINUED | OUTPATIENT
Start: 2018-07-05 | End: 2018-08-13 | Stop reason: HOSPADM

## 2018-07-05 RX ORDER — HYDROXYZINE HYDROCHLORIDE 25 MG/1
25 TABLET, FILM COATED ORAL 3 TIMES DAILY PRN
Status: DISCONTINUED | OUTPATIENT
Start: 2018-07-05 | End: 2018-08-13 | Stop reason: HOSPADM

## 2018-07-05 RX ORDER — SUCRALFATE 1 G/1
1 TABLET ORAL 4 TIMES DAILY
Status: DISCONTINUED | OUTPATIENT
Start: 2018-07-05 | End: 2018-08-13 | Stop reason: HOSPADM

## 2018-07-05 RX ORDER — NICOTINE 21 MG/24HR
1 PATCH, TRANSDERMAL 24 HOURS TRANSDERMAL DAILY
Status: DISCONTINUED | OUTPATIENT
Start: 2018-07-05 | End: 2018-08-05

## 2018-07-05 RX ORDER — TRAZODONE HYDROCHLORIDE 50 MG/1
50 TABLET ORAL NIGHTLY PRN
Status: DISCONTINUED | OUTPATIENT
Start: 2018-07-05 | End: 2018-08-01

## 2018-07-05 RX ORDER — ACETAMINOPHEN 500 MG
500 TABLET ORAL EVERY 4 HOURS PRN
Status: DISCONTINUED | OUTPATIENT
Start: 2018-07-05 | End: 2018-08-13 | Stop reason: HOSPADM

## 2018-07-05 RX ADMIN — ERGOCALCIFEROL 50000 UNITS: 1.25 CAPSULE ORAL at 18:40

## 2018-07-05 RX ADMIN — MAGNESIUM HYDROXIDE 10 ML: 400 SUSPENSION ORAL at 22:23

## 2018-07-05 RX ADMIN — METHADONE HYDROCHLORIDE 94 MG: 10 SOLUTION ORAL at 18:38

## 2018-07-05 RX ADMIN — QUETIAPINE FUMARATE 400 MG: 200 TABLET ORAL at 22:23

## 2018-07-05 RX ADMIN — ACETAMINOPHEN 500 MG: 500 TABLET, FILM COATED ORAL at 22:23

## 2018-07-05 ASSESSMENT — PATIENT HEALTH QUESTIONNAIRE - PHQ9: SUM OF ALL RESPONSES TO PHQ QUESTIONS 1-9: 25

## 2018-07-05 ASSESSMENT — PAIN DESCRIPTION - PAIN TYPE: TYPE: ACUTE PAIN

## 2018-07-05 ASSESSMENT — PAIN SCALES - GENERAL
PAINLEVEL_OUTOF10: 6
PAINLEVEL_OUTOF10: 5
PAINLEVEL_OUTOF10: 8
PAINLEVEL_OUTOF10: 2
PAINLEVEL_OUTOF10: 8

## 2018-07-05 ASSESSMENT — SLEEP AND FATIGUE QUESTIONNAIRES
RESTFUL SLEEP: NO
DIFFICULTY ARISING: NO
DIFFICULTY FALLING ASLEEP: YES
AVERAGE NUMBER OF SLEEP HOURS: 0
DO YOU USE A SLEEP AID: YES
SLEEP PATTERN: DIFFICULTY FALLING ASLEEP;RESTLESSNESS;DISTURBED/INTERRUPTED SLEEP
DO YOU HAVE DIFFICULTY SLEEPING: YES
DIFFICULTY STAYING ASLEEP: YES

## 2018-07-05 ASSESSMENT — PAIN DESCRIPTION - ONSET: ONSET: GRADUAL

## 2018-07-05 ASSESSMENT — LIFESTYLE VARIABLES: HISTORY_ALCOHOL_USE: NO

## 2018-07-05 ASSESSMENT — PAIN DESCRIPTION - LOCATION: LOCATION: GENERALIZED

## 2018-07-05 ASSESSMENT — PAIN DESCRIPTION - ORIENTATION: ORIENTATION: OTHER (COMMENT)

## 2018-07-05 ASSESSMENT — PAIN DESCRIPTION - DESCRIPTORS: DESCRIPTORS: ACHING

## 2018-07-05 ASSESSMENT — PAIN DESCRIPTION - FREQUENCY: FREQUENCY: INTERMITTENT

## 2018-07-05 NOTE — BH NOTE
Patient given tobacco quitline number 64817700470 at this time, refusing to call at this time, states \" I just dont want to quit now\"- patient given information as to the dangers of long term tobacco use. Continue to reinforce the importance of tobacco cessation.

## 2018-07-05 NOTE — BH NOTE
(urinary tract infection)        Status EXAM:  Status and Exam  Normal: No  Facial Expression: Avoids Gaze, Flat, Sad, Worried  Affect: Blunt  Level of Consciousness: Alert  Mood:Normal: No  Mood: Depressed, Anxious, Labile, Suspicious, Angry, Helpless, Irritable, Sad  Motor Activity:Normal: No  Motor Activity: Increased  Interview Behavior: Cooperative, Impulsive, Irritable  Preception: [place] to Person, [place]alia Kayser to Time, [place] to Place, [place] to Situation  Attention:Normal: No  Attention: Unable to Concentrate, Distractible  Thought Processes: Blocking, Circumstantial  Thought Content:Normal: No  Thought Content: Poverty of Content, Preoccupations, Paranoia, Delusions  Hallucinations: Visual (Comment) (seeing people following him/out to get him)  Delusions: Yes  Delusions: Persecution  Memory:Normal: No  Memory: Confabulation, Poor Recent  Insight and Judgment: No  Insight and Judgment: Poor Judgment, Poor Insight, Unrealistic  Present Suicidal Ideation: Yes  Present Homicidal Ideation: Yes    Tobacco Screening:  Practical Counseling, on admission, reuben X, if applicable and completed (first 3 are required if patient doesn't refuse): (x )  Recognizing danger situations (included triggers and roadblocks)                    ( x)  Coping skills (new ways to manage stress, exercise, relaxation techniques, changing routine, distraction)                                                           ( x)  Basic information about quitting (benefits of quitting, techniques in how to quit, available resources  ( ) Referral for counseling faxed to [facility]                                           ( ) Patient refused counseling  ( ) Patient has not smoked in the last 30 days    Metabolic Screening:    Lab Results   Component Value Date    LABA1C 5.7 10/23/2014       No results for input(s): CHOL, TRIG, HDL, LDLCALC, LABVLDL in the last 72 hours. Body mass index is 33.58 kg/m².     BP Readings from

## 2018-07-06 PROCEDURE — 6370000000 HC RX 637 (ALT 250 FOR IP): Performed by: PSYCHIATRY & NEUROLOGY

## 2018-07-06 PROCEDURE — 1240000000 HC EMOTIONAL WELLNESS R&B

## 2018-07-06 RX ADMIN — QUETIAPINE FUMARATE 400 MG: 200 TABLET ORAL at 22:30

## 2018-07-06 RX ADMIN — SUCRALFATE 1 G: 1 TABLET ORAL at 22:33

## 2018-07-06 RX ADMIN — METHADONE HYDROCHLORIDE 94 MG: 10 SOLUTION ORAL at 09:11

## 2018-07-06 RX ADMIN — ACETAMINOPHEN 500 MG: 500 TABLET, FILM COATED ORAL at 02:40

## 2018-07-06 RX ADMIN — ACETAMINOPHEN 500 MG: 500 TABLET, FILM COATED ORAL at 22:30

## 2018-07-06 ASSESSMENT — PAIN SCALES - GENERAL
PAINLEVEL_OUTOF10: 0
PAINLEVEL_OUTOF10: 5
PAINLEVEL_OUTOF10: 2
PAINLEVEL_OUTOF10: 0
PAINLEVEL_OUTOF10: 0
PAINLEVEL_OUTOF10: 7
PAINLEVEL_OUTOF10: 0

## 2018-07-06 ASSESSMENT — LIFESTYLE VARIABLES: HISTORY_ALCOHOL_USE: NO

## 2018-07-06 ASSESSMENT — PAIN DESCRIPTION - PAIN TYPE: TYPE: ACUTE PAIN

## 2018-07-06 ASSESSMENT — PAIN DESCRIPTION - LOCATION: LOCATION: GENERALIZED

## 2018-07-06 NOTE — PLAN OF CARE
over which he or she has no control  Able to verbalize acceptance of life and situations over which he or she has no control   Outcome: Ongoing    Goal: Able to verbalize and/or display a decrease in depressive symptoms  Able to verbalize and/or display a decrease in depressive symptoms   Outcome: Ongoing

## 2018-07-06 NOTE — BH NOTE
[address] [facility]                   [address] [facility], [address]                              PSYCHIATRIC EVALUATION    PATIENT NAME: Iris Maher                    :        1990  MED REC NO:   [id number]                              ROOM:       0106  ACCOUNT NO:   [de-identified]                           ADMIT DATE: 2018  PROVIDER:     Mercedez Pereira    COMPREHENSIVE PSYCHIATRIC EVALUATION    HISTORY OF PRESENTING ILLNESS AND REASON FOR CURRENT ADMISSION:  The  patient is a 26-year-old male who is feeling depressed and sad, complains  of suicidal thoughts and he feels that he should kill himself by shooting  with a gun or cutting his wrist and die. With this, he is admitted to [place] from [facility]. PAST PSYCHIATRIC HISTORY:  History of bipolar disorder and opioid  dependence. MEDICAL AND SURGICAL HISTORY:  He has arthritis. ALLERGIES:  He is allergic to GEODON, HALDOL, IV DYE, DICYCLOMINE, and  FAMOTIDINE. PERSONAL, FAMILY, AND SOCIAL HISTORY:  He is just living with friends. He  has no job or income. He gets social security disability. He denies any  retirement long-term time. He denies any physical, sexual, or emotional abuse in  him. He denies any family history of mental illness, suicide, or drug and  alcohol abuse. MENTAL STATUS EXAMINATION:  The patient is cooperative. He is tearful,  crying, feels hopeless, useless, and worthless. He has delusions of  persecution thinking that people are trying to shoot him and they are  coming to his apartment and stealing _____ on him and he has no insight. His judgement is impaired. He is of average intelligence. He is oriented  to time, place, and person. His memory to recent, remote, and immediate  events is within normal limits. He has adequate attention and  concentration. His insight and judgement are impaired. His abstraction is  concrete. DIAGNOSES:  1.   Bipolar disorder, current episode depressed. 2.  Arthritis. 3.  Opioid dependence. TREATMENT AND PLAN:  1. Admit him. 2.  Start him on medications. 3.  Stabilize him. ESTIMATED LENGTH OF STAY:  10 days.         Og Patel    D: 07/05/2018 19:12:00       T: 07/05/2018 19:54:10     [email or web address]  Job#: [id number]     Doc#: [id number]    CC:

## 2018-07-06 NOTE — PLAN OF CARE
Problem: Depressive Behavior With or Without Suicide Precautions:  Goal: Able to verbalize and/or display a decrease in depressive symptoms  Able to verbalize and/or display a decrease in depressive symptoms   Outcome: Ongoing  PSYCHOEDUCATION GROUP NOTE    Date: July 6, 2018  Start Time: 1430  End Time: 1515    Number Participants in Group:  5/20    Goal:  Patient will demonstrate increased interpersonal interaction   Topic: Leisure Skills Group     Discipline Responsible:   OT  AT    Ns. x RT MHP Other       Participation Level:     None  Minimal   x Active Listener x Interactive    Monopolizing         Participation Quality:  x Appropriate  Inappropriate   x       Attentive        Intrusive   x       Sharing        Resistant          Supportive        Lethargic       Affective:   x Congruent  Incongruent  Blunted  Flat    Constricted  Anxious  Elated  Angry    Labile  Depressed  Other         Cognitive:  x Alert x Oriented PPTP     Concentration x G  F  P   Attention Span x G  F  P   Short-Term Memory x G  F  P   Long-Term Memory x G  F  P   ProblemSolving/  Decision Making x G  F  P   Ability to Process  Information x G  F  P      Contributing Factors             Delusional             Hallucinating             Flight of Ideas             Other:       Modes of Intervention:  x Education  Support  Exploration    Clarifying x Problem Solving  Confrontation   x Socialization  Limit Setting x Reality Testing   x Activity  Movement  Media    Other:            Response to Learning:   Able to verbalize current knowledge/experience    Able to verbalize/acknowledge new learning    Able to retain information    Capable of insight    Able to change behavior   x Progressing to goal    Other:        Comments:

## 2018-07-06 NOTE — BH NOTE
(ERGOCALCIFEROL) capsule 50,000 Units  50,000 Units Oral Weekly Trey Barnes MD   50,000 Units at 07/05/18 1840    traZODone (DESYREL) tablet 50 mg  50 mg Oral Nightly PRN Trey Barnes MD        aluminum & magnesium hydroxide-simethicone (MAALOX) 200-200-20 MG/5ML suspension 30 mL  30 mL Oral TID PRN Trey Barnes MD        magnesium hydroxide (MILK OF MAGNESIA) 400 MG/5ML suspension 10 mL  10 mL Oral BID PRN Trey Barnes MD        acetaminophen (TYLENOL) tablet 500 mg  500 mg Oral Q4H PRN Trey Barnes MD        loperamide (IMODIUM) capsule 2 mg  2 mg Oral BID PRN Trey Barnes MD        hydrOXYzine (ATARAX) tablet 25 mg  25 mg Oral TID PRN Trey Barnes MD        nicotine (NICODERM CQ) 21 MG/24HR 1 patch  1 patch Transdermal Daily Marcia PINK MD   1 patch at 07/05/18 1842     [unfilled]    H&P  Labs  Encourage to attend groups  Supportive therapy  ELOS: 5-7 days      Electronically signed by Rhys Blake MD on 7/5/2018 at 8:11 PM

## 2018-07-06 NOTE — PLAN OF CARE
Problem: Anger Management/Homicidal Ideation:  Goal: Absence of homicidal ideation  Absence of homicidal ideation   Outcome: Ongoing  PSYCHOEDUCATION GROUP NOTE    Date: July 6, 2018  Start Time: 1330  End Time: 1415    Number Participants in Group:  13/20    Goal:  Patient will demonstrate increased interpersonal interaction   Topic: Exercising positive thinking     Discipline Responsible:   OT  AT    Ns. x RT MHP Other       Participation Level:     None  Minimal   x Active Listener x Interactive    Monopolizing         Participation Quality:  x Appropriate  Inappropriate   x       Attentive        Intrusive   x       Sharing        Resistant          Supportive        Lethargic       Affective:   x Congruent  Incongruent  Blunted  Flat    Constricted  Anxious  Elated  Angry    Labile  Depressed  Other         Cognitive:  x Alert x Oriented PPTP     Concentration x G  F  P   Attention Span x G  F  P   Short-Term Memory x G  F  P   Long-Term Memory x G  F  P   ProblemSolving/  Decision Making x G  F  P   Ability to Process  Information x G  F  P      Contributing Factors             Delusional             Hallucinating             Flight of Ideas             Other:       Modes of Intervention:  x Education  Support  Exploration    Clarifying x Problem Solving  Confrontation   x Socialization  Limit Setting x Reality Testing   x Activity  Movement  Media    Other:            Response to Learning:   Able to verbalize current knowledge/experience    Able to verbalize/acknowledge new learning    Able to retain information    Capable of insight    Able to change behavior   x Progressing to goal    Other:        Comments:

## 2018-07-07 PROCEDURE — 6360000002 HC RX W HCPCS: Performed by: PSYCHIATRY & NEUROLOGY

## 2018-07-07 PROCEDURE — 6370000000 HC RX 637 (ALT 250 FOR IP): Performed by: PSYCHIATRY & NEUROLOGY

## 2018-07-07 PROCEDURE — 1240000000 HC EMOTIONAL WELLNESS R&B

## 2018-07-07 RX ORDER — PROMETHAZINE HYDROCHLORIDE 12.5 MG/1
12.5 TABLET ORAL EVERY 6 HOURS PRN
Status: DISCONTINUED | OUTPATIENT
Start: 2018-07-07 | End: 2018-08-13 | Stop reason: HOSPADM

## 2018-07-07 RX ORDER — DULOXETIN HYDROCHLORIDE 20 MG/1
40 CAPSULE, DELAYED RELEASE ORAL DAILY
Status: DISCONTINUED | OUTPATIENT
Start: 2018-07-07 | End: 2018-07-16

## 2018-07-07 RX ORDER — GABAPENTIN 100 MG/1
100 CAPSULE ORAL 3 TIMES DAILY
Status: DISCONTINUED | OUTPATIENT
Start: 2018-07-07 | End: 2018-07-14

## 2018-07-07 RX ADMIN — QUETIAPINE FUMARATE 400 MG: 200 TABLET ORAL at 23:00

## 2018-07-07 RX ADMIN — SUCRALFATE 1 G: 1 TABLET ORAL at 13:30

## 2018-07-07 RX ADMIN — ACETAMINOPHEN 500 MG: 500 TABLET, FILM COATED ORAL at 23:00

## 2018-07-07 RX ADMIN — PROMETHAZINE HYDROCHLORIDE 12.5 MG: 12.5 TABLET ORAL at 23:00

## 2018-07-07 RX ADMIN — DULOXETINE 40 MG: 20 CAPSULE, DELAYED RELEASE ORAL at 14:27

## 2018-07-07 RX ADMIN — GABAPENTIN 100 MG: 100 CAPSULE ORAL at 14:27

## 2018-07-07 RX ADMIN — GABAPENTIN 100 MG: 100 CAPSULE ORAL at 23:00

## 2018-07-07 RX ADMIN — SUCRALFATE 1 G: 1 TABLET ORAL at 09:45

## 2018-07-07 RX ADMIN — SUCRALFATE 1 G: 1 TABLET ORAL at 17:00

## 2018-07-07 RX ADMIN — METHADONE HYDROCHLORIDE 94 MG: 10 SOLUTION ORAL at 09:46

## 2018-07-07 RX ADMIN — PROMETHAZINE HYDROCHLORIDE 12.5 MG: 12.5 TABLET ORAL at 12:34

## 2018-07-07 RX ADMIN — PANTOPRAZOLE SODIUM 40 MG: 40 TABLET, DELAYED RELEASE ORAL at 09:45

## 2018-07-07 ASSESSMENT — PAIN SCALES - GENERAL
PAINLEVEL_OUTOF10: 7
PAINLEVEL_OUTOF10: 4
PAINLEVEL_OUTOF10: 6

## 2018-07-07 ASSESSMENT — PAIN DESCRIPTION - LOCATION
LOCATION: GENERALIZED
LOCATION: BACK;NECK

## 2018-07-07 ASSESSMENT — PAIN DESCRIPTION - PAIN TYPE
TYPE: ACUTE PAIN
TYPE: ACUTE PAIN

## 2018-07-07 ASSESSMENT — PAIN DESCRIPTION - FREQUENCY: FREQUENCY: INTERMITTENT

## 2018-07-07 ASSESSMENT — PAIN DESCRIPTION - ORIENTATION: ORIENTATION: UPPER

## 2018-07-07 ASSESSMENT — PAIN DESCRIPTION - DESCRIPTORS: DESCRIPTORS: ACHING;DISCOMFORT

## 2018-07-07 NOTE — BH NOTE
Patient says that he is feeling helpless, useless. Patient feels overwhelmed and sad. Patient feels that he/she has no motivation or energy. Patient feels suicidal and wants to end life by overdosing on medication. Patient that people are trying to harm him by poisoning him. Patient feels that people are talking about him. He has dysphoric mood and affect. He feels that his life is a waste and heshould die. He has been hearing voices telling him to die.    Plan: to continue current management

## 2018-07-07 NOTE — PLAN OF CARE
Problem: Anger Management/Homicidal Ideation:  Goal: Absence of homicidal ideation  Absence of homicidal ideation   Outcome: Ongoing  Pt did not participate in Goal Setting / Community Meeting group at 0900 despite staff encouragement.

## 2018-07-08 PROCEDURE — 6370000000 HC RX 637 (ALT 250 FOR IP): Performed by: PSYCHIATRY & NEUROLOGY

## 2018-07-08 PROCEDURE — 6360000002 HC RX W HCPCS: Performed by: PSYCHIATRY & NEUROLOGY

## 2018-07-08 PROCEDURE — 1240000000 HC EMOTIONAL WELLNESS R&B

## 2018-07-08 PROCEDURE — 94664 DEMO&/EVAL PT USE INHALER: CPT

## 2018-07-08 RX ADMIN — QUETIAPINE FUMARATE 400 MG: 200 TABLET ORAL at 22:05

## 2018-07-08 RX ADMIN — MAGNESIUM HYDROXIDE 10 ML: 400 SUSPENSION ORAL at 12:18

## 2018-07-08 RX ADMIN — METHADONE HYDROCHLORIDE 94 MG: 10 SOLUTION ORAL at 08:50

## 2018-07-08 RX ADMIN — MOMETASONE FUROATE AND FORMOTEROL FUMARATE DIHYDRATE 2 PUFF: 200; 5 AEROSOL RESPIRATORY (INHALATION) at 22:05

## 2018-07-08 RX ADMIN — GABAPENTIN 100 MG: 100 CAPSULE ORAL at 13:29

## 2018-07-08 RX ADMIN — PROMETHAZINE HYDROCHLORIDE 12.5 MG: 12.5 TABLET ORAL at 22:07

## 2018-07-08 RX ADMIN — SUCRALFATE 1 G: 1 TABLET ORAL at 22:04

## 2018-07-08 RX ADMIN — TRAZODONE HYDROCHLORIDE 50 MG: 50 TABLET ORAL at 22:05

## 2018-07-08 RX ADMIN — GABAPENTIN 100 MG: 100 CAPSULE ORAL at 08:53

## 2018-07-08 RX ADMIN — PROMETHAZINE HYDROCHLORIDE 12.5 MG: 12.5 TABLET ORAL at 08:52

## 2018-07-08 RX ADMIN — MAGNESIUM HYDROXIDE 10 ML: 400 SUSPENSION ORAL at 22:42

## 2018-07-08 RX ADMIN — GABAPENTIN 100 MG: 100 CAPSULE ORAL at 22:04

## 2018-07-08 ASSESSMENT — PAIN DESCRIPTION - PAIN TYPE
TYPE: CHRONIC PAIN
TYPE: ACUTE PAIN

## 2018-07-08 ASSESSMENT — PAIN SCALES - GENERAL
PAINLEVEL_OUTOF10: 4
PAINLEVEL_OUTOF10: 0
PAINLEVEL_OUTOF10: 10

## 2018-07-08 ASSESSMENT — PAIN DESCRIPTION - LOCATION
LOCATION: HEAD
LOCATION: GENERALIZED

## 2018-07-08 NOTE — PLAN OF CARE
Problem: Anger Management/Homicidal Ideation:  Goal: Absence of homicidal ideation  Absence of homicidal ideation   Outcome: Ongoing  Pt denies any SI or HI and also denies hallucinations. Is able to verbalize that he feels a lot of paranoia. Controlled. No morning groups.

## 2018-07-08 NOTE — BH NOTE
This RN has reviewed and agrees with all the documentation entered by Latesha Quijano and Laly Bazan LPN.

## 2018-07-09 PROCEDURE — 6370000000 HC RX 637 (ALT 250 FOR IP): Performed by: PSYCHIATRY & NEUROLOGY

## 2018-07-09 PROCEDURE — 6360000002 HC RX W HCPCS: Performed by: PSYCHIATRY & NEUROLOGY

## 2018-07-09 PROCEDURE — 1240000000 HC EMOTIONAL WELLNESS R&B

## 2018-07-09 RX ORDER — BACLOFEN 10 MG/1
10 TABLET ORAL 2 TIMES DAILY
Status: DISCONTINUED | OUTPATIENT
Start: 2018-07-09 | End: 2018-08-13 | Stop reason: HOSPADM

## 2018-07-09 RX ADMIN — GABAPENTIN 100 MG: 100 CAPSULE ORAL at 21:28

## 2018-07-09 RX ADMIN — METHADONE HYDROCHLORIDE 94 MG: 10 SOLUTION ORAL at 10:09

## 2018-07-09 RX ADMIN — GABAPENTIN 100 MG: 100 CAPSULE ORAL at 08:42

## 2018-07-09 RX ADMIN — MOMETASONE FUROATE AND FORMOTEROL FUMARATE DIHYDRATE 2 PUFF: 200; 5 AEROSOL RESPIRATORY (INHALATION) at 21:29

## 2018-07-09 RX ADMIN — SUCRALFATE 1 G: 1 TABLET ORAL at 17:42

## 2018-07-09 RX ADMIN — GABAPENTIN 100 MG: 100 CAPSULE ORAL at 14:56

## 2018-07-09 RX ADMIN — SUCRALFATE 1 G: 1 TABLET ORAL at 21:28

## 2018-07-09 RX ADMIN — PROMETHAZINE HYDROCHLORIDE 12.5 MG: 12.5 TABLET ORAL at 23:55

## 2018-07-09 RX ADMIN — PANTOPRAZOLE SODIUM 40 MG: 40 TABLET, DELAYED RELEASE ORAL at 08:42

## 2018-07-09 RX ADMIN — QUETIAPINE FUMARATE 400 MG: 200 TABLET ORAL at 21:28

## 2018-07-09 RX ADMIN — ACETAMINOPHEN 500 MG: 500 TABLET, FILM COATED ORAL at 21:28

## 2018-07-09 RX ADMIN — PROMETHAZINE HYDROCHLORIDE 12.5 MG: 12.5 TABLET ORAL at 17:42

## 2018-07-09 RX ADMIN — BACLOFEN 10 MG: 10 TABLET ORAL at 21:28

## 2018-07-09 RX ADMIN — MOMETASONE FUROATE AND FORMOTEROL FUMARATE DIHYDRATE 2 PUFF: 200; 5 AEROSOL RESPIRATORY (INHALATION) at 08:43

## 2018-07-09 RX ADMIN — DULOXETINE 40 MG: 20 CAPSULE, DELAYED RELEASE ORAL at 10:18

## 2018-07-09 RX ADMIN — SUCRALFATE 1 G: 1 TABLET ORAL at 08:43

## 2018-07-09 RX ADMIN — ALUMINUM HYDROXIDE, MAGNESIUM HYDROXIDE, AND SIMETHICONE 30 ML: 200; 200; 20 SUSPENSION ORAL at 12:57

## 2018-07-09 ASSESSMENT — PAIN DESCRIPTION - LOCATION
LOCATION: HEAD
LOCATION: ABDOMEN
LOCATION: ABDOMEN

## 2018-07-09 ASSESSMENT — PAIN SCALES - GENERAL
PAINLEVEL_OUTOF10: 5
PAINLEVEL_OUTOF10: 5
PAINLEVEL_OUTOF10: 9
PAINLEVEL_OUTOF10: 7
PAINLEVEL_OUTOF10: 0

## 2018-07-09 ASSESSMENT — PAIN DESCRIPTION - PAIN TYPE: TYPE: CHRONIC PAIN

## 2018-07-09 NOTE — PLAN OF CARE
Problem: Anger Management/Homicidal Ideation:  Goal: Absence of homicidal ideation  Absence of homicidal ideation   Outcome: Ongoing  Denies feelings to harm self    Problem: Depressive Behavior With or Without Suicide Precautions:  Goal: Able to verbalize acceptance of life and situations over which he or she has no control  Able to verbalize acceptance of life and situations over which he or she has no control   Outcome: Ongoing  Patient props toiletries against door, so when door opens toiletries fall and wake him up. Patient was asked not to place items in front of door. Patient stated \"I need to now when people enter my room\". Staff attempted to explain to patient that staff rounds every 15 minutes,  Patient later placed night  front of door. Goal: Able to verbalize and/or display a decrease in depressive symptoms  Able to verbalize and/or display a decrease in depressive symptoms   Outcome: Ongoing  Patient remains isolative to room,  Flat affect.  Out for needs only

## 2018-07-09 NOTE — BH NOTE
Psychoeducation Group Note    Date: 07/09/18  Start Time: 1600  End Time: 1650    Number Participants in Group:  12/23    Goal:  Patient will demonstrate increased interpersonal interaction   Topic: Positivity    Discipline Responsible:   OT  AT   x Nsg.  RT  Other       Participation Level:     None x Minimal    Active Listener  Interactive    Monopolizing         Participation Quality:   Appropriate  Inappropriate          Attentive        Intrusive          Sharing        Resistant          Supportive x       Lethargic     ffective:    Congruent  Incongruent  Blunted  Flat    Constricted  Anxious  Elated  Angry    Labile  Depressed  Other         Cognitive:   Alert x Oriented PPTP     Concentration  G  F x P   Attention Span  G  F x P   Short-Term Memory  G  F  P   Long-Term Memory  G  F  P   ProblemSolving/  Decision Making  G  F x P   Ability to Process  Information  G  F x P      Contributing Factors             Delusional             Hallucinating             Flight of Ideas             Other:       Modes of Intervention:  x Education x Support x Exploration    Clarifying x Problem Solving  Confrontation    Socialization  Limit Setting  Reality Testing    Activity  Movement  Media    Other:            Response to Learning:  x Able to verbalize current knowledge/experience    Able to verbalize/acknowledge new learning    Able to retain information    Capable of insight    Able to change behavior    Progressing to goal    Other:        Comments:

## 2018-07-10 PROCEDURE — 6370000000 HC RX 637 (ALT 250 FOR IP): Performed by: PSYCHIATRY & NEUROLOGY

## 2018-07-10 PROCEDURE — 6360000002 HC RX W HCPCS: Performed by: PSYCHIATRY & NEUROLOGY

## 2018-07-10 PROCEDURE — 1240000000 HC EMOTIONAL WELLNESS R&B

## 2018-07-10 RX ADMIN — PROMETHAZINE HYDROCHLORIDE 12.5 MG: 12.5 TABLET ORAL at 19:24

## 2018-07-10 RX ADMIN — DULOXETINE 40 MG: 20 CAPSULE, DELAYED RELEASE ORAL at 14:22

## 2018-07-10 RX ADMIN — SUCRALFATE 1 G: 1 TABLET ORAL at 22:59

## 2018-07-10 RX ADMIN — QUETIAPINE FUMARATE 400 MG: 200 TABLET ORAL at 22:59

## 2018-07-10 RX ADMIN — PANTOPRAZOLE SODIUM 40 MG: 40 TABLET, DELAYED RELEASE ORAL at 14:21

## 2018-07-10 RX ADMIN — SUCRALFATE 1 G: 1 TABLET ORAL at 08:59

## 2018-07-10 RX ADMIN — GABAPENTIN 100 MG: 100 CAPSULE ORAL at 22:59

## 2018-07-10 RX ADMIN — METHADONE HYDROCHLORIDE 94 MG: 10 SOLUTION ORAL at 08:59

## 2018-07-10 RX ADMIN — HYDROXYZINE HYDROCHLORIDE 25 MG: 25 TABLET, FILM COATED ORAL at 14:22

## 2018-07-10 RX ADMIN — BACLOFEN 10 MG: 10 TABLET ORAL at 22:59

## 2018-07-10 RX ADMIN — MAGNESIUM HYDROXIDE 10 ML: 400 SUSPENSION ORAL at 19:26

## 2018-07-10 RX ADMIN — ACETAMINOPHEN 500 MG: 500 TABLET, FILM COATED ORAL at 19:24

## 2018-07-10 RX ADMIN — BACLOFEN 10 MG: 10 TABLET ORAL at 14:22

## 2018-07-10 RX ADMIN — GABAPENTIN 100 MG: 100 CAPSULE ORAL at 08:59

## 2018-07-10 ASSESSMENT — PAIN DESCRIPTION - LOCATION
LOCATION: BACK;LEG
LOCATION: HAND
LOCATION: HAND
LOCATION: BACK

## 2018-07-10 ASSESSMENT — PAIN SCALES - GENERAL
PAINLEVEL_OUTOF10: 7
PAINLEVEL_OUTOF10: 7
PAINLEVEL_OUTOF10: 0
PAINLEVEL_OUTOF10: 2

## 2018-07-10 ASSESSMENT — PAIN DESCRIPTION - PAIN TYPE
TYPE: ACUTE PAIN

## 2018-07-10 ASSESSMENT — PAIN DESCRIPTION - FREQUENCY
FREQUENCY: INTERMITTENT
FREQUENCY: INTERMITTENT

## 2018-07-10 ASSESSMENT — PAIN DESCRIPTION - DESCRIPTORS
DESCRIPTORS: ACHING
DESCRIPTORS: ACHING

## 2018-07-10 ASSESSMENT — PAIN DESCRIPTION - ORIENTATION
ORIENTATION: RIGHT;LEFT

## 2018-07-10 NOTE — BH NOTE
Patient is tearful. He feels depressed and sad. He feels that her life is a waste and he should kill himself and die. He is planning to overdose on medication and die. He complains of lack of energy and motivation. He complains of lack of concentration. Patient is feeling depressed and sad. He feels hopeless, useless and worthless. He plans to overdose and die. He is having agitation and  Has dysphoric mood. Lena Norse He wants to kill himself and die. Plan: to continue current management.

## 2018-07-10 NOTE — FLOWSHEET NOTE
07/10/18 1430   Encounter Summary   Services provided to: Patient   Referral/Consult From: (Spirituality group)   Continue Visiting (7/10/18)   Complexity of Encounter Low   Length of Encounter 30 minutes   Spiritual/Jehovah's witness   Type Spiritual support   Intervention (Patient attended spirituality group led by Fr. Radha Steele)

## 2018-07-11 PROCEDURE — 6360000002 HC RX W HCPCS: Performed by: PSYCHIATRY & NEUROLOGY

## 2018-07-11 PROCEDURE — 1240000000 HC EMOTIONAL WELLNESS R&B

## 2018-07-11 PROCEDURE — 6370000000 HC RX 637 (ALT 250 FOR IP): Performed by: PSYCHIATRY & NEUROLOGY

## 2018-07-11 PROCEDURE — 99221 1ST HOSP IP/OBS SF/LOW 40: CPT | Performed by: INTERNAL MEDICINE

## 2018-07-11 RX ADMIN — MAGNESIUM HYDROXIDE 10 ML: 400 SUSPENSION ORAL at 22:25

## 2018-07-11 RX ADMIN — GABAPENTIN 100 MG: 100 CAPSULE ORAL at 09:58

## 2018-07-11 RX ADMIN — METHADONE HYDROCHLORIDE 94 MG: 10 SOLUTION ORAL at 09:58

## 2018-07-11 RX ADMIN — BACLOFEN 10 MG: 10 TABLET ORAL at 09:57

## 2018-07-11 RX ADMIN — SUCRALFATE 1 G: 1 TABLET ORAL at 09:57

## 2018-07-11 RX ADMIN — BACLOFEN 10 MG: 10 TABLET ORAL at 22:20

## 2018-07-11 RX ADMIN — PROMETHAZINE HYDROCHLORIDE 12.5 MG: 12.5 TABLET ORAL at 22:19

## 2018-07-11 RX ADMIN — SUCRALFATE 1 G: 1 TABLET ORAL at 13:30

## 2018-07-11 RX ADMIN — CARIPRAZINE 1.5 MG: 1.5 CAPSULE, GELATIN COATED ORAL at 09:58

## 2018-07-11 RX ADMIN — GABAPENTIN 100 MG: 100 CAPSULE ORAL at 22:19

## 2018-07-11 RX ADMIN — SUCRALFATE 1 G: 1 TABLET ORAL at 22:20

## 2018-07-11 RX ADMIN — DULOXETINE 40 MG: 20 CAPSULE, DELAYED RELEASE ORAL at 09:58

## 2018-07-11 RX ADMIN — QUETIAPINE FUMARATE 400 MG: 200 TABLET ORAL at 22:20

## 2018-07-11 RX ADMIN — ACETAMINOPHEN 500 MG: 500 TABLET, FILM COATED ORAL at 22:20

## 2018-07-11 RX ADMIN — GABAPENTIN 100 MG: 100 CAPSULE ORAL at 14:30

## 2018-07-11 ASSESSMENT — PAIN SCALES - GENERAL
PAINLEVEL_OUTOF10: 6
PAINLEVEL_OUTOF10: 3
PAINLEVEL_OUTOF10: 6
PAINLEVEL_OUTOF10: 1

## 2018-07-11 ASSESSMENT — PAIN DESCRIPTION - PAIN TYPE
TYPE: ACUTE PAIN

## 2018-07-11 ASSESSMENT — PAIN DESCRIPTION - ORIENTATION: ORIENTATION: MID;LOWER

## 2018-07-11 ASSESSMENT — PAIN DESCRIPTION - LOCATION
LOCATION: HEAD
LOCATION: HEAD
LOCATION: BACK

## 2018-07-11 ASSESSMENT — PAIN DESCRIPTION - DESCRIPTORS: DESCRIPTORS: ACHING;DISCOMFORT

## 2018-07-11 ASSESSMENT — PAIN DESCRIPTION - FREQUENCY: FREQUENCY: INTERMITTENT

## 2018-07-11 NOTE — PLAN OF CARE
Problem: Depressive Behavior With or Without Suicide Precautions:  Goal: Able to verbalize acceptance of life and situations over which he or she has no control  Able to verbalize acceptance of life and situations over which he or she has no control   Outcome: Ongoing  Pt did not participate in Therapeutic Leisure Skills Group at 1100 despite staff encouragement.

## 2018-07-12 PROCEDURE — 1240000000 HC EMOTIONAL WELLNESS R&B

## 2018-07-12 PROCEDURE — 99231 SBSQ HOSP IP/OBS SF/LOW 25: CPT | Performed by: INTERNAL MEDICINE

## 2018-07-12 PROCEDURE — 6370000000 HC RX 637 (ALT 250 FOR IP): Performed by: PSYCHIATRY & NEUROLOGY

## 2018-07-12 PROCEDURE — 6360000002 HC RX W HCPCS: Performed by: PSYCHIATRY & NEUROLOGY

## 2018-07-12 RX ADMIN — CARIPRAZINE 1.5 MG: 1.5 CAPSULE, GELATIN COATED ORAL at 09:19

## 2018-07-12 RX ADMIN — PROMETHAZINE HYDROCHLORIDE 12.5 MG: 12.5 TABLET ORAL at 20:02

## 2018-07-12 RX ADMIN — ACETAMINOPHEN 500 MG: 500 TABLET, FILM COATED ORAL at 21:12

## 2018-07-12 RX ADMIN — GABAPENTIN 100 MG: 100 CAPSULE ORAL at 22:41

## 2018-07-12 RX ADMIN — GABAPENTIN 100 MG: 100 CAPSULE ORAL at 09:20

## 2018-07-12 RX ADMIN — DULOXETINE 40 MG: 20 CAPSULE, DELAYED RELEASE ORAL at 09:20

## 2018-07-12 RX ADMIN — SUCRALFATE 1 G: 1 TABLET ORAL at 09:20

## 2018-07-12 RX ADMIN — QUETIAPINE FUMARATE 400 MG: 200 TABLET ORAL at 22:41

## 2018-07-12 RX ADMIN — PANTOPRAZOLE SODIUM 40 MG: 40 TABLET, DELAYED RELEASE ORAL at 09:20

## 2018-07-12 RX ADMIN — MAGNESIUM HYDROXIDE 10 ML: 400 SUSPENSION ORAL at 20:06

## 2018-07-12 RX ADMIN — ERGOCALCIFEROL 50000 UNITS: 1.25 CAPSULE ORAL at 09:20

## 2018-07-12 RX ADMIN — BACLOFEN 10 MG: 10 TABLET ORAL at 09:19

## 2018-07-12 RX ADMIN — SUCRALFATE 1 G: 1 TABLET ORAL at 13:20

## 2018-07-12 RX ADMIN — BACLOFEN 10 MG: 10 TABLET ORAL at 21:12

## 2018-07-12 RX ADMIN — METHADONE HYDROCHLORIDE 94 MG: 10 SOLUTION ORAL at 09:21

## 2018-07-12 RX ADMIN — GABAPENTIN 100 MG: 100 CAPSULE ORAL at 14:15

## 2018-07-12 ASSESSMENT — PAIN DESCRIPTION - ORIENTATION: ORIENTATION: LOWER;MID

## 2018-07-12 ASSESSMENT — PAIN DESCRIPTION - LOCATION
LOCATION: ABDOMEN
LOCATION: HEAD;BACK

## 2018-07-12 ASSESSMENT — PAIN DESCRIPTION - FREQUENCY: FREQUENCY: INTERMITTENT

## 2018-07-12 ASSESSMENT — PAIN DESCRIPTION - PAIN TYPE
TYPE: ACUTE PAIN
TYPE: ACUTE PAIN

## 2018-07-12 ASSESSMENT — PAIN SCALES - GENERAL
PAINLEVEL_OUTOF10: 3
PAINLEVEL_OUTOF10: 2
PAINLEVEL_OUTOF10: 5
PAINLEVEL_OUTOF10: 1

## 2018-07-12 ASSESSMENT — PAIN DESCRIPTION - DESCRIPTORS: DESCRIPTORS: ACHING;DISCOMFORT

## 2018-07-12 NOTE — PLAN OF CARE
Problem: Anger Management/Homicidal Ideation:  Goal: Absence of homicidal ideation  Absence of homicidal ideation   Outcome: Ongoing  Patient denies all this shift but remains anxious on unit and seeks those with motherly qualities. Patient is selective with his medications and which to take or not. Patient is free from self harm this shift.

## 2018-07-12 NOTE — CONSULTS
[address] [facility]  Patient name:  Josh Bhardwaj  Date of admission:  7/5/2018  3:01 PM  MRN:   [id number]  YOB: 1990      Cc Abd pain     HPI   Pt admitted with sympts of depression     Consult for abdominal pain   Pt reports he had previous gastric ulcer with blood in stools      Past Medical History:   Diagnosis Date    Anxiety     Arthritis     Asthma     Bipolar I disorder, most recent episode (or current) depressed, unspecified 9/12/2014    Clostridium difficile infection     COPD (chronic obstructive pulmonary disease) ([address]     Depression     Disease of blood and blood forming organ     Eczema     Fracture, metacarpal     R 4th and 5th    Gastric ulcer     Gastritis 06/13/2018    GERD (gastroesophageal reflux disease)     GI bleed     H. 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[address]     Substance abuse     Suicidal ideation     Suicide attempt by hanging ([address]     Tobacco dependence     Ulcerative colitis ([address]     UTI (urinary tract infection)      Family History   Problem Relation Age of Onset    Diabetes Father     Alcohol Abuse Father     Depression Father     Arthritis Father     High Blood Pressure Father     Other Father         aneurysm & epilepsy    Migraines Father     Arthritis Mother     Other Mother         aneurysm & epilepsy    Migraines Mother     Diabetes Brother         Aunt and uncles    Depression Brother     Mental Illness Brother     Other Brother         epilepsy    Migraines Brother     Stroke Other         Uncle    Other Brother         murdered Oct 6th, 2014    Colon Cancer Paternal

## 2018-07-12 NOTE — PLAN OF CARE
Problem: Depressive Behavior With or Without Suicide Precautions:  Goal: Able to verbalize acceptance of life and situations over which he or she has no control  Able to verbalize acceptance of life and situations over which he or she has no control   Outcome: Ongoing  Psychoeducation Group Note    Date: 7/12/18  Start Time: 1300  End Time: 1325    Number Participants in Group:  11    Goal:  Patient will demonstrate increased interpersonal interaction.    Topic:  Community Resources - YUE    Discipline Responsible:   OT  AT  [facility]. X RT  Other       Participation Level:     None X Minimal    Active Listener  Interactive    Monopolizing         Participation Quality:   Appropriate X Inappropriate          Attentive        Intrusive          Sharing        Resistant          Supportive X       Lethargic       Affective:    Congruent  Incongruent  Blunted X Flat    Constricted  Anxious  Elated  Angry    Labile  Depressed  Other  Bright       Cognitive:  X Alert X Oriented PPTP     Concentration  G  F X P   Attention Span  G  F X P   Short-Term Memory  G  F  P   Long-Term Memory  G  F  P   ProblemSolving/  Decision Making  G  F  P   Ability to Process  Information  G  F  P      Contributing Factors             Delusional             Hallucinating             Flight of Ideas             Other:       Modes of Intervention:  X Education  Support X Exploration   X Clarifying  Problem Solving  Confrontation    Socialization  Limit Setting  Reality Testing    Activity  Movement  Media   X Other: Community Resources YUE           Response to Learning:   Able to verbalize current knowledge/experience    Able to verbalize/acknowledge new learning    Able to retain information    Capable of insight    Able to change behavior    Progressing to goal    Other:        Comments:

## 2018-07-13 PROCEDURE — 99231 SBSQ HOSP IP/OBS SF/LOW 25: CPT | Performed by: INTERNAL MEDICINE

## 2018-07-13 PROCEDURE — 6370000000 HC RX 637 (ALT 250 FOR IP): Performed by: PSYCHIATRY & NEUROLOGY

## 2018-07-13 PROCEDURE — 1240000000 HC EMOTIONAL WELLNESS R&B

## 2018-07-13 PROCEDURE — 6360000002 HC RX W HCPCS: Performed by: PSYCHIATRY & NEUROLOGY

## 2018-07-13 RX ADMIN — GABAPENTIN 100 MG: 100 CAPSULE ORAL at 22:51

## 2018-07-13 RX ADMIN — BACLOFEN 10 MG: 10 TABLET ORAL at 22:51

## 2018-07-13 RX ADMIN — ACETAMINOPHEN 500 MG: 500 TABLET, FILM COATED ORAL at 18:30

## 2018-07-13 RX ADMIN — ACETAMINOPHEN 500 MG: 500 TABLET, FILM COATED ORAL at 22:51

## 2018-07-13 RX ADMIN — CARIPRAZINE 1.5 MG: 1.5 CAPSULE, GELATIN COATED ORAL at 08:41

## 2018-07-13 RX ADMIN — PANTOPRAZOLE SODIUM 40 MG: 40 TABLET, DELAYED RELEASE ORAL at 08:41

## 2018-07-13 RX ADMIN — GABAPENTIN 100 MG: 100 CAPSULE ORAL at 08:41

## 2018-07-13 RX ADMIN — SUCRALFATE 1 G: 1 TABLET ORAL at 08:41

## 2018-07-13 RX ADMIN — PROMETHAZINE HYDROCHLORIDE 12.5 MG: 12.5 TABLET ORAL at 22:51

## 2018-07-13 RX ADMIN — SUCRALFATE 1 G: 1 TABLET ORAL at 22:50

## 2018-07-13 RX ADMIN — METHADONE HYDROCHLORIDE 94 MG: 10 SOLUTION ORAL at 08:42

## 2018-07-13 RX ADMIN — DULOXETINE 40 MG: 20 CAPSULE, DELAYED RELEASE ORAL at 08:41

## 2018-07-13 RX ADMIN — SUCRALFATE 1 G: 1 TABLET ORAL at 18:30

## 2018-07-13 RX ADMIN — BACLOFEN 10 MG: 10 TABLET ORAL at 08:41

## 2018-07-13 ASSESSMENT — PAIN SCALES - GENERAL
PAINLEVEL_OUTOF10: 4
PAINLEVEL_OUTOF10: 7
PAINLEVEL_OUTOF10: 6
PAINLEVEL_OUTOF10: 3
PAINLEVEL_OUTOF10: 1
PAINLEVEL_OUTOF10: 8

## 2018-07-13 ASSESSMENT — PAIN DESCRIPTION - LOCATION
LOCATION: ABDOMEN
LOCATION: BACK

## 2018-07-13 ASSESSMENT — PAIN DESCRIPTION - PAIN TYPE
TYPE: ACUTE PAIN
TYPE: ACUTE PAIN

## 2018-07-13 NOTE — PLAN OF CARE
Problem: Depressive Behavior With or Without Suicide Precautions:  Goal: Able to verbalize acceptance of life and situations over which he or she has no control  Able to verbalize acceptance of life and situations over which he or she has no control   Outcome: Ongoing  Pt did not participate in Cognitive Skills Therapeutic Group at 1330 despite staff encouragement.

## 2018-07-13 NOTE — PLAN OF CARE
Problem: Depressive Behavior With or Without Suicide Precautions:  Goal: Able to verbalize and/or display a decrease in depressive symptoms  Able to verbalize and/or display a decrease in depressive symptoms   Outcome: Ongoing  Pt did not attend Self-Awareness/Concentration skills group at 1430 d/t resting in room despite staff invitation to attend.

## 2018-07-13 NOTE — PLAN OF CARE
Problem: Depressive Behavior With or Without Suicide Precautions:  Goal: Able to verbalize and/or display a decrease in depressive symptoms  Able to verbalize and/or display a decrease in depressive symptoms   Outcome: Ongoing  Pt did not attend Leisure Skills/Problem Solving/Decision Making group at 1100 d/t resting in room despite staff invitation to attend.

## 2018-07-14 PROCEDURE — 1240000000 HC EMOTIONAL WELLNESS R&B

## 2018-07-14 PROCEDURE — 6370000000 HC RX 637 (ALT 250 FOR IP): Performed by: PSYCHIATRY & NEUROLOGY

## 2018-07-14 PROCEDURE — 6360000002 HC RX W HCPCS: Performed by: PSYCHIATRY & NEUROLOGY

## 2018-07-14 RX ORDER — GABAPENTIN 300 MG/1
300 CAPSULE ORAL 3 TIMES DAILY
Status: DISCONTINUED | OUTPATIENT
Start: 2018-07-14 | End: 2018-08-13 | Stop reason: HOSPADM

## 2018-07-14 RX ADMIN — CARIPRAZINE 1.5 MG: 1.5 CAPSULE, GELATIN COATED ORAL at 08:40

## 2018-07-14 RX ADMIN — GABAPENTIN 300 MG: 300 CAPSULE ORAL at 23:00

## 2018-07-14 RX ADMIN — DULOXETINE 40 MG: 20 CAPSULE, DELAYED RELEASE ORAL at 08:40

## 2018-07-14 RX ADMIN — GABAPENTIN 100 MG: 100 CAPSULE ORAL at 08:39

## 2018-07-14 RX ADMIN — SUCRALFATE 1 G: 1 TABLET ORAL at 23:01

## 2018-07-14 RX ADMIN — PANTOPRAZOLE SODIUM 40 MG: 40 TABLET, DELAYED RELEASE ORAL at 08:41

## 2018-07-14 RX ADMIN — ACETAMINOPHEN 500 MG: 500 TABLET, FILM COATED ORAL at 13:43

## 2018-07-14 RX ADMIN — PROMETHAZINE HYDROCHLORIDE 12.5 MG: 12.5 TABLET ORAL at 16:50

## 2018-07-14 RX ADMIN — QUETIAPINE FUMARATE 400 MG: 200 TABLET ORAL at 23:01

## 2018-07-14 RX ADMIN — SUCRALFATE 1 G: 1 TABLET ORAL at 08:39

## 2018-07-14 RX ADMIN — ACETAMINOPHEN 500 MG: 500 TABLET, FILM COATED ORAL at 19:47

## 2018-07-14 RX ADMIN — BACLOFEN 10 MG: 10 TABLET ORAL at 08:41

## 2018-07-14 RX ADMIN — MAGNESIUM HYDROXIDE 10 ML: 400 SUSPENSION ORAL at 09:54

## 2018-07-14 RX ADMIN — SUCRALFATE 1 G: 1 TABLET ORAL at 16:50

## 2018-07-14 RX ADMIN — BACLOFEN 10 MG: 10 TABLET ORAL at 23:00

## 2018-07-14 RX ADMIN — METHADONE HYDROCHLORIDE 94 MG: 10 SOLUTION ORAL at 09:40

## 2018-07-14 RX ADMIN — PROMETHAZINE HYDROCHLORIDE 12.5 MG: 12.5 TABLET ORAL at 09:54

## 2018-07-14 RX ADMIN — GABAPENTIN 300 MG: 300 CAPSULE ORAL at 13:43

## 2018-07-14 RX ADMIN — SUCRALFATE 1 G: 1 TABLET ORAL at 13:43

## 2018-07-14 RX ADMIN — PROMETHAZINE HYDROCHLORIDE 12.5 MG: 12.5 TABLET ORAL at 23:01

## 2018-07-14 ASSESSMENT — PAIN DESCRIPTION - PAIN TYPE
TYPE: ACUTE PAIN
TYPE: ACUTE PAIN

## 2018-07-14 ASSESSMENT — PAIN DESCRIPTION - ORIENTATION: ORIENTATION: RIGHT;LEFT

## 2018-07-14 ASSESSMENT — PAIN SCALES - GENERAL
PAINLEVEL_OUTOF10: 6
PAINLEVEL_OUTOF10: 8
PAINLEVEL_OUTOF10: 7
PAINLEVEL_OUTOF10: 6
PAINLEVEL_OUTOF10: 3
PAINLEVEL_OUTOF10: 3
PAINLEVEL_OUTOF10: 2

## 2018-07-14 ASSESSMENT — PAIN DESCRIPTION - LOCATION
LOCATION: LEG
LOCATION: HEAD

## 2018-07-14 NOTE — PLAN OF CARE
Problem: Anger Management/Homicidal Ideation:  Goal: Absence of homicidal ideation  Absence of homicidal ideation   Outcome: Ongoing  PSYCHOEDUCATION GROUP NOTE  Date: 7/14/2018 Start Time: 1330 End Time: 1440  Number Participants in Group: 13/22  Goal: Patient will demonstrate increased interpersonal interaction, knowledge of coping skills/crisis prevention, and cognition function. Topic: Social/ Cognitive/ Coping/ Crisis Prevention skills group  ? Discipline Responsible:   OT  AT  [facility]. X RT [facility] Other   ? Participation Level:    None  Minimal   X Active Listener X Interactive    Monopolizing     ? Participation Quality:  X Appropriate ? Inappropriate   X Attentive ? Intrusive   X Sharing ? Resistant   X Supportive ? Lethargic   ? Affective:   X Congruent ? Incongruent ? Blunted ? Flat   ? Constricted ? Anxious ? Elated ? Angry   ? Labile ? Depressed ? Other ? bright   ? Cognitive:  X Alert X Oriented PPTP     Concentration  G ?X F ? P   Attention Span  G X F  P   Short-Term Memory ? G ? F ? P   Long-Term Memory ? G ? F ? P   ProblemSolving/  Decision Making  G ?X F ? P   Ability to Process  Information  G ?X F ? P     ? Contributing Factors   ? Delusional   ? Hallucinating   ? Flight of Ideas   ? Other:   ? Modes of Intervention:  x Education x Support x Exploration   ? Clarifying x Problem Solving ? Confrontation   x Socialization ? Limit Setting ? Reality Testing   x Activity ? Movement ? Media   ? Other:  ? ? ? ?   ? Response to Learning:  X Able to verbalize current knowledge/experience   X Able to verbalize/acknowledge new learning   X Able to retain information   X Capable of insight   ? Able to change behavior   X Progressing to goal   ? Other:    ?  Comments: PT ACTIVELY AND SOCIALLY PARTICIPATED IN GROUP. PT NEEDED TO BE REDIRECTED TO STAY ON TOPIC A FEW TIMES.

## 2018-07-14 NOTE — PLAN OF CARE
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 Changes to plan of Care NO    Status EXAM:   Status and Exam  Normal: No  Facial Expression: Flat  Affect: Appropriate  Level of Consciousness: Alert  Mood:Normal: No  Mood: Depressed, Anxious, Helpless, Sad  Motor Activity:Normal: No  Motor Activity: Decreased  Interview Behavior: Cooperative, Evasive  Preception: [place] to Person, Yobany Sell to Time, [place] to Place, [place] to Situation  Attention:Normal: No  Attention: Distractible, Unable to Concentrate  Thought Processes: Blocking, Circumstantial  Thought Content:Normal: No  Thought Content: Preoccupations, Poverty of Content, Paranoia  Hallucinations: None  Delusions: No  Delusions: Persecution  Memory:Normal: Yes  Memory: Confabulation, Poor Recent  Insight and Judgment: No  Insight and Judgment: Poor Judgment, Poor Insight, Unmotivated, Unrealistic  Present Suicidal Ideation: No  Present Homicidal Ideation: No    Daily Assessment Last Entry:   Daily Sleep (WDL): Within Defined Limits         Patient Currently in Pain: Yes  Daily Nutrition (WDL): Within Defined Limits    Patient Monitoring:  Frequency of Checks: 4 times per hour, close    Psychiatric Symptoms:   Depression Symptoms  Depression Symptoms: Feelings of helplessness, Feelings of hopelessess, Impaired concentration, Isolative, Loss of interest, Feelings of worthlessness  Anxiety Symptoms  Anxiety Symptoms: Generalized  Elizabeth Symptoms  Elizabeth Symptoms: No problems reported or observed.      Psychosis Symptoms  Delusion Type:

## 2018-07-15 PROCEDURE — 6360000002 HC RX W HCPCS: Performed by: PSYCHIATRY & NEUROLOGY

## 2018-07-15 PROCEDURE — 6370000000 HC RX 637 (ALT 250 FOR IP): Performed by: PSYCHIATRY & NEUROLOGY

## 2018-07-15 PROCEDURE — 1240000000 HC EMOTIONAL WELLNESS R&B

## 2018-07-15 RX ADMIN — SUCRALFATE 1 G: 1 TABLET ORAL at 08:55

## 2018-07-15 RX ADMIN — METHADONE HYDROCHLORIDE 94 MG: 10 SOLUTION ORAL at 08:56

## 2018-07-15 RX ADMIN — GABAPENTIN 300 MG: 300 CAPSULE ORAL at 08:55

## 2018-07-15 RX ADMIN — MAGNESIUM HYDROXIDE 10 ML: 400 SUSPENSION ORAL at 03:50

## 2018-07-15 RX ADMIN — PROMETHAZINE HYDROCHLORIDE 12.5 MG: 12.5 TABLET ORAL at 22:48

## 2018-07-15 RX ADMIN — PANTOPRAZOLE SODIUM 40 MG: 40 TABLET, DELAYED RELEASE ORAL at 08:55

## 2018-07-15 RX ADMIN — ACETAMINOPHEN 500 MG: 500 TABLET, FILM COATED ORAL at 03:50

## 2018-07-15 RX ADMIN — GABAPENTIN 300 MG: 300 CAPSULE ORAL at 22:48

## 2018-07-15 RX ADMIN — BACLOFEN 10 MG: 10 TABLET ORAL at 08:55

## 2018-07-15 RX ADMIN — PROMETHAZINE HYDROCHLORIDE 12.5 MG: 12.5 TABLET ORAL at 03:50

## 2018-07-15 RX ADMIN — DULOXETINE 40 MG: 20 CAPSULE, DELAYED RELEASE ORAL at 08:55

## 2018-07-15 RX ADMIN — CARIPRAZINE 3 MG: 3 CAPSULE, GELATIN COATED ORAL at 08:55

## 2018-07-15 RX ADMIN — SUCRALFATE 1 G: 1 TABLET ORAL at 12:53

## 2018-07-15 RX ADMIN — HYDROXYZINE HYDROCHLORIDE 25 MG: 25 TABLET, FILM COATED ORAL at 22:48

## 2018-07-15 RX ADMIN — QUETIAPINE FUMARATE 400 MG: 200 TABLET ORAL at 22:47

## 2018-07-15 RX ADMIN — PROMETHAZINE HYDROCHLORIDE 12.5 MG: 12.5 TABLET ORAL at 12:53

## 2018-07-15 RX ADMIN — SUCRALFATE 1 G: 1 TABLET ORAL at 22:47

## 2018-07-15 RX ADMIN — GABAPENTIN 300 MG: 300 CAPSULE ORAL at 12:53

## 2018-07-15 RX ADMIN — BACLOFEN 10 MG: 10 TABLET ORAL at 22:47

## 2018-07-15 ASSESSMENT — PAIN SCALES - GENERAL
PAINLEVEL_OUTOF10: 2
PAINLEVEL_OUTOF10: 2
PAINLEVEL_OUTOF10: 4
PAINLEVEL_OUTOF10: 6

## 2018-07-15 ASSESSMENT — PAIN DESCRIPTION - FREQUENCY: FREQUENCY: INTERMITTENT

## 2018-07-15 ASSESSMENT — PAIN DESCRIPTION - PAIN TYPE: TYPE: ACUTE PAIN

## 2018-07-15 ASSESSMENT — PAIN DESCRIPTION - LOCATION: LOCATION: HEAD

## 2018-07-15 ASSESSMENT — PAIN DESCRIPTION - DESCRIPTORS: DESCRIPTORS: HEADACHE

## 2018-07-15 NOTE — PLAN OF CARE
Problem: Depressive Behavior With or Without Suicide Precautions:  Goal: Able to verbalize acceptance of life and situations over which he or she has no control  Able to verbalize acceptance of life and situations over which he or she has no control   Outcome: Ongoing  Pt did not participate in Goal Setting and Community Meeting at 2332 despite staff encouragement.

## 2018-07-15 NOTE — PLAN OF CARE
Problem: Anger Management/Homicidal Ideation:  Goal: Absence of homicidal ideation  Absence of homicidal ideation   Outcome: Ongoing  Pt denies HI

## 2018-07-16 PROCEDURE — 6370000000 HC RX 637 (ALT 250 FOR IP): Performed by: PSYCHIATRY & NEUROLOGY

## 2018-07-16 PROCEDURE — 1240000000 HC EMOTIONAL WELLNESS R&B

## 2018-07-16 PROCEDURE — 6360000002 HC RX W HCPCS: Performed by: PSYCHIATRY & NEUROLOGY

## 2018-07-16 RX ORDER — DULOXETIN HYDROCHLORIDE 60 MG/1
60 CAPSULE, DELAYED RELEASE ORAL DAILY
Status: DISCONTINUED | OUTPATIENT
Start: 2018-07-17 | End: 2018-07-23

## 2018-07-16 RX ADMIN — MAGNESIUM HYDROXIDE 10 ML: 400 SUSPENSION ORAL at 19:27

## 2018-07-16 RX ADMIN — PROMETHAZINE HYDROCHLORIDE 12.5 MG: 12.5 TABLET ORAL at 22:01

## 2018-07-16 RX ADMIN — GABAPENTIN 300 MG: 300 CAPSULE ORAL at 22:00

## 2018-07-16 RX ADMIN — SUCRALFATE 1 G: 1 TABLET ORAL at 22:01

## 2018-07-16 RX ADMIN — CARIPRAZINE 3 MG: 3 CAPSULE, GELATIN COATED ORAL at 08:32

## 2018-07-16 RX ADMIN — HYDROXYZINE HYDROCHLORIDE 25 MG: 25 TABLET, FILM COATED ORAL at 19:27

## 2018-07-16 RX ADMIN — SUCRALFATE 1 G: 1 TABLET ORAL at 08:32

## 2018-07-16 RX ADMIN — METHADONE HYDROCHLORIDE 94 MG: 10 SOLUTION ORAL at 08:32

## 2018-07-16 RX ADMIN — ACETAMINOPHEN 500 MG: 500 TABLET, FILM COATED ORAL at 22:01

## 2018-07-16 RX ADMIN — BACLOFEN 10 MG: 10 TABLET ORAL at 22:01

## 2018-07-16 RX ADMIN — DULOXETINE 40 MG: 20 CAPSULE, DELAYED RELEASE ORAL at 08:32

## 2018-07-16 RX ADMIN — ALUMINUM HYDROXIDE, MAGNESIUM HYDROXIDE, AND SIMETHICONE 30 ML: 200; 200; 20 SUSPENSION ORAL at 19:27

## 2018-07-16 RX ADMIN — QUETIAPINE FUMARATE 400 MG: 200 TABLET ORAL at 22:01

## 2018-07-16 RX ADMIN — GABAPENTIN 300 MG: 300 CAPSULE ORAL at 13:17

## 2018-07-16 RX ADMIN — BACLOFEN 10 MG: 10 TABLET ORAL at 08:32

## 2018-07-16 RX ADMIN — GABAPENTIN 300 MG: 300 CAPSULE ORAL at 08:32

## 2018-07-16 RX ADMIN — PANTOPRAZOLE SODIUM 40 MG: 40 TABLET, DELAYED RELEASE ORAL at 08:32

## 2018-07-16 RX ADMIN — SUCRALFATE 1 G: 1 TABLET ORAL at 13:17

## 2018-07-16 ASSESSMENT — PAIN SCALES - GENERAL
PAINLEVEL_OUTOF10: 6
PAINLEVEL_OUTOF10: 6
PAINLEVEL_OUTOF10: 4

## 2018-07-16 ASSESSMENT — PAIN DESCRIPTION - LOCATION: LOCATION: ABDOMEN

## 2018-07-16 ASSESSMENT — PAIN DESCRIPTION - PAIN TYPE: TYPE: CHRONIC PAIN

## 2018-07-16 NOTE — PLAN OF CARE
Problem: Depressive Behavior With or Without Suicide Precautions:  Goal: Able to verbalize and/or display a decrease in depressive symptoms  Able to verbalize and/or display a decrease in depressive symptoms   Outcome: Ongoing  Psychoeducation Group Note    Date: 7/16/2018  Start Time: 1330  End Time: 1415    Number Participants in Group:  17    Goal:  Patient will demonstrate increased interpersonal interaction. Topic:  Social Skills / Recalling / Leisure Skills and Awareness    Discipline Responsible:   OT  AT  [facility]. X RT  Other       Participation Level:     None  Minimal   x Active Listener x Interactive    Monopolizing         Participation Quality:  x Appropriate  Inappropriate   x       Attentive        Intrusive   x       Sharing        Resistant          Supportive        Lethargic       Affective:   x Congruent  Incongruent  Blunted  Flat    Constricted  Anxious  Elated  Angry    Labile  Depressed  Other  Bright       Cognitive:  x Alert x Oriented PPTP     Concentration x G  F  P   Attention Span x G  F  P   Short-Term Memory x G  F  P   Long-Term Memory  G  F  P   ProblemSolving/  Decision Making x G  F  P   Ability to Process  Information x G  F  P      Contributing Factors             Delusional             Hallucinating             Flight of Ideas             Other:       Modes of Intervention:  x Education  Support x Exploration   x Clarifying x Problem Solving x Confrontation   x Socialization x Limit Setting x Reality Testing   x Activity  Movement  Media    Other:            Response to Learning:  x Able to verbalize current knowledge/experience   x Able to verbalize/acknowledge new learning   x Able to retain information   x Capable of insight    Able to change behavior   x Progressing to goal    Other:        Comments:  Patient joined group late but actively participated in the last 15 minutes.

## 2018-07-16 NOTE — PLAN OF CARE
Problem: Anger Management/Homicidal Ideation:  Goal: Absence of homicidal ideation  Absence of homicidal ideation   Outcome: Ongoing  Pt did not participate in Goal Setting / [employer] group at [id number] despite staff encouragement.

## 2018-07-17 PROCEDURE — 6360000002 HC RX W HCPCS: Performed by: PSYCHIATRY & NEUROLOGY

## 2018-07-17 PROCEDURE — 6370000000 HC RX 637 (ALT 250 FOR IP): Performed by: PSYCHIATRY & NEUROLOGY

## 2018-07-17 PROCEDURE — 1240000000 HC EMOTIONAL WELLNESS R&B

## 2018-07-17 RX ADMIN — SUCRALFATE 1 G: 1 TABLET ORAL at 12:37

## 2018-07-17 RX ADMIN — GABAPENTIN 300 MG: 300 CAPSULE ORAL at 22:45

## 2018-07-17 RX ADMIN — QUETIAPINE FUMARATE 400 MG: 200 TABLET ORAL at 22:45

## 2018-07-17 RX ADMIN — SUCRALFATE 1 G: 1 TABLET ORAL at 17:32

## 2018-07-17 RX ADMIN — MAGNESIUM HYDROXIDE 10 ML: 400 SUSPENSION ORAL at 17:31

## 2018-07-17 RX ADMIN — PROMETHAZINE HYDROCHLORIDE 12.5 MG: 12.5 TABLET ORAL at 22:45

## 2018-07-17 RX ADMIN — CARIPRAZINE 4.5 MG: 3 CAPSULE, GELATIN COATED ORAL at 12:38

## 2018-07-17 RX ADMIN — ACETAMINOPHEN 500 MG: 500 TABLET, FILM COATED ORAL at 17:32

## 2018-07-17 RX ADMIN — GABAPENTIN 300 MG: 300 CAPSULE ORAL at 14:38

## 2018-07-17 RX ADMIN — DULOXETINE HYDROCHLORIDE 60 MG: 60 CAPSULE, DELAYED RELEASE ORAL at 12:38

## 2018-07-17 RX ADMIN — PROMETHAZINE HYDROCHLORIDE 12.5 MG: 12.5 TABLET ORAL at 17:32

## 2018-07-17 RX ADMIN — BACLOFEN 10 MG: 10 TABLET ORAL at 22:45

## 2018-07-17 RX ADMIN — SUCRALFATE 1 G: 1 TABLET ORAL at 22:45

## 2018-07-17 RX ADMIN — METHADONE HYDROCHLORIDE 94 MG: 10 SOLUTION ORAL at 12:41

## 2018-07-17 ASSESSMENT — PAIN SCALES - GENERAL
PAINLEVEL_OUTOF10: 9
PAINLEVEL_OUTOF10: 3
PAINLEVEL_OUTOF10: 2

## 2018-07-17 ASSESSMENT — PAIN DESCRIPTION - ORIENTATION: ORIENTATION: RIGHT;LEFT

## 2018-07-17 ASSESSMENT — PAIN DESCRIPTION - LOCATION: LOCATION: ARM;LEG

## 2018-07-17 ASSESSMENT — PAIN DESCRIPTION - DESCRIPTORS: DESCRIPTORS: ACHING;DISCOMFORT;NAGGING

## 2018-07-17 ASSESSMENT — PAIN - FUNCTIONAL ASSESSMENT: PAIN_FUNCTIONAL_ASSESSMENT: 0-10

## 2018-07-17 ASSESSMENT — PAIN DESCRIPTION - PAIN TYPE: TYPE: ACUTE PAIN

## 2018-07-17 NOTE — BH NOTE
The patient has restricted mood and affect. The patient has at times inappropriate affect. Patient has loose associations. He tries to talk about some subject and is never able to come back to the original topic. Patient has delusions of persecution and delusions of reference. Patient is experiencing auditory hallucinations that are giving him running commentary. The patient has increased psychomotor activity. The patient has poor eye contact. The patient has poor at rapport. The patient has poor insight. His judgment is impaired. Plan: To continue current management.

## 2018-07-17 NOTE — BH NOTE
Patient continues to be selective with meds. Agreed to take Vraylar, Cymbalta, Carafate, and Methoadone with encouragement. Writer explained the importance of treatment plan compliance and what warrants discharge. Patient agrees. Encouraged to discuss further concerns with Attending. Safety maintained.

## 2018-07-17 NOTE — PLAN OF CARE
Problem: Depressive Behavior With or Without Suicide Precautions:  Goal: Able to verbalize acceptance of life and situations over which he or she has no control  Able to verbalize acceptance of life and situations over which he or she has no control   Outcome: Ongoing  Pt did not participate in The [address] [place] [address] at 1100 despite staff encouragement.

## 2018-07-17 NOTE — BH NOTE
Writer requested a mouth check during AM med pass and observed patient  \"cheeking\" meds. Writer encouraged medication compliance, but patient not receptive and spit meds out. Reports he is not taking any meds, except the Methadone until he speaks with Dr. Sam Mendez. Reassurance and reorientation provided, but patient continued to refuse. Dr. Sam Mendez notified. Instructed writer to attempt to administer scheduled meds, but if patient refuses ok to hold Methadone. Writer explained the importance of treatment plan/medication compliance. Discussed the benefits of inpatient treatment and what warrants discharge. Writer will continue to monitor and intervene as needed. Safety maintained.

## 2018-07-17 NOTE — PLAN OF CARE
Problem: Depressive Behavior With or Without Suicide Precautions:  Goal: Able to verbalize acceptance of life and situations over which he or she has no control  Able to verbalize acceptance of life and situations over which he or she has no control   Outcome: Ongoing  Pt is not accepting of tx today, flat, lethargic at times. Attends minimal groups and refuses all medications. Isolative for long intervals. Social when out of room. Denies any hallucinations, does relate to feeling paranoid and still having that thought process.

## 2018-07-18 PROCEDURE — 1240000000 HC EMOTIONAL WELLNESS R&B

## 2018-07-18 PROCEDURE — 6370000000 HC RX 637 (ALT 250 FOR IP): Performed by: PSYCHIATRY & NEUROLOGY

## 2018-07-18 PROCEDURE — 6360000002 HC RX W HCPCS: Performed by: PSYCHIATRY & NEUROLOGY

## 2018-07-18 RX ADMIN — CARIPRAZINE 4.5 MG: 3 CAPSULE, GELATIN COATED ORAL at 08:44

## 2018-07-18 RX ADMIN — GABAPENTIN 300 MG: 300 CAPSULE ORAL at 22:25

## 2018-07-18 RX ADMIN — BACLOFEN 10 MG: 10 TABLET ORAL at 22:25

## 2018-07-18 RX ADMIN — GABAPENTIN 300 MG: 300 CAPSULE ORAL at 08:44

## 2018-07-18 RX ADMIN — QUETIAPINE FUMARATE 400 MG: 200 TABLET ORAL at 22:25

## 2018-07-18 RX ADMIN — MAGNESIUM HYDROXIDE 10 ML: 400 SUSPENSION ORAL at 12:31

## 2018-07-18 RX ADMIN — HYDROXYZINE HYDROCHLORIDE 25 MG: 25 TABLET, FILM COATED ORAL at 23:29

## 2018-07-18 RX ADMIN — METHADONE HYDROCHLORIDE 94 MG: 10 SOLUTION ORAL at 08:45

## 2018-07-18 RX ADMIN — MOMETASONE FUROATE AND FORMOTEROL FUMARATE DIHYDRATE 2 PUFF: 200; 5 AEROSOL RESPIRATORY (INHALATION) at 08:43

## 2018-07-18 RX ADMIN — DULOXETINE HYDROCHLORIDE 60 MG: 60 CAPSULE, DELAYED RELEASE ORAL at 08:44

## 2018-07-18 RX ADMIN — PROMETHAZINE HYDROCHLORIDE 12.5 MG: 12.5 TABLET ORAL at 22:25

## 2018-07-18 RX ADMIN — SUCRALFATE 1 G: 1 TABLET ORAL at 17:12

## 2018-07-18 RX ADMIN — GABAPENTIN 300 MG: 300 CAPSULE ORAL at 14:34

## 2018-07-18 RX ADMIN — SUCRALFATE 1 G: 1 TABLET ORAL at 08:44

## 2018-07-18 RX ADMIN — BACLOFEN 10 MG: 10 TABLET ORAL at 08:44

## 2018-07-18 RX ADMIN — SUCRALFATE 1 G: 1 TABLET ORAL at 12:28

## 2018-07-18 RX ADMIN — PROMETHAZINE HYDROCHLORIDE 12.5 MG: 12.5 TABLET ORAL at 16:01

## 2018-07-18 RX ADMIN — SUCRALFATE 1 G: 1 TABLET ORAL at 22:25

## 2018-07-18 RX ADMIN — PANTOPRAZOLE SODIUM 40 MG: 40 TABLET, DELAYED RELEASE ORAL at 08:44

## 2018-07-18 ASSESSMENT — PAIN SCALES - GENERAL
PAINLEVEL_OUTOF10: 0

## 2018-07-18 NOTE — CARE COORDINATION
HOUSING COORDINATION    Pt provided information to A [facility]  Johnie Dempsey @ [phone].  Writer left vm and requested information and will continue to coordinate per pt request for community supports and engagement

## 2018-07-18 NOTE — PLAN OF CARE
Problem: Depressive Behavior With or Without Suicide Precautions:  Goal: Able to verbalize and/or display a decrease in depressive symptoms  Able to verbalize and/or display a decrease in depressive symptoms   Outcome: Ongoing  Psychoeducation Group Note    Date: 7/18/2018  Start Time: 1430  End Time: 1515    Number Participants in Group:  12    Goal:  Patient will demonstrate increased interpersonal interaction.    Topic:  Humor / Social Skills / Leisure Skills and Awareness    Discipline Responsible:   OT  AT  [facility]. X RT  Other       Participation Level:     None  Minimal   x Active Listener x Interactive    Monopolizing         Participation Quality:  x Appropriate  Inappropriate   x       Attentive        Intrusive   x       Sharing        Resistant          Supportive        Lethargic       Affective:   x Congruent  Incongruent  Blunted  Flat    Constricted  Anxious  Elated  Angry    Labile  Depressed  Other  Bright       Cognitive:  x Alert x Oriented PPTP     Concentration x G  F  P   Attention Span x G  F  P   Short-Term Memory x G  F  P   Long-Term Memory  G  F  P   ProblemSolving/  Decision Making x G  F  P   Ability to Process  Information x G  F  P      Contributing Factors             Delusional             Hallucinating             Flight of Ideas             Other:       Modes of Intervention:   Education  Support x Exploration   x Clarifying x Problem Solving x Confrontation   x Socialization x Limit Setting x Reality Testing   x Activity  Movement  Media    Other:            Response to Learning:  x Able to verbalize current knowledge/experience   x Able to verbalize/acknowledge new learning   x Able to retain information   x Capable of insight    Able to change behavior   x Progressing to goal    Other:        Comments:

## 2018-07-18 NOTE — PLAN OF CARE
Problem: Anger Management/Homicidal Ideation:  Goal: Absence of homicidal ideation  Absence of homicidal ideation   Outcome: Ongoing  Pt did not participate in Goal Setting / [employer] at 8024 despite staff encouragement.

## 2018-07-18 NOTE — PLAN OF CARE
Problem: Depressive Behavior With or Without Suicide Precautions:  Goal: Able to verbalize and/or display a decrease in depressive symptoms  Able to verbalize and/or display a decrease in depressive symptoms   Outcome: Ongoing  Psychoeducation Group Note    Date: 7/18/18  Start Time: 1330  End Time: 1415    Number Participants in Group:  9    Goal:  Patient will demonstrate increased interpersonal interaction.    Topic:  Cognitive Skills Therapeutic Group    Discipline Responsible:   OT  AT  [facility]. X RT  Other       Participation Level:     None  Minimal   X Active Listener X Interactive    Monopolizing         Participation Quality:  X Appropriate  Inappropriate   X       Attentive        Intrusive   X       Sharing        Resistant   X       Supportive        Lethargic       Affective:   X Congruent  Incongruent  Blunted  Flat    Constricted  Anxious  Elated  Angry    Labile  Depressed  Other  Bright       Cognitive:  X Alert X Oriented PPTP     Concentration X G  F  P   Attention Span X G  F  P   Short-Term Memory X G  F  P   Long-Term Memory X G  F  P   ProblemSolving/  Decision Making X G  F  P   Ability to Process  Information X G  F  P      Contributing Factors             Delusional             Hallucinating             Flight of Ideas             Other:       Modes of Intervention:  X Education X Support X Exploration    Clarifying X Problem Solving  Confrontation   X Socialization X Limit Setting  Reality Testing   X Activity  Movement  Media    Other:            Response to Learning:  X Able to verbalize current knowledge/experience   X Able to verbalize/acknowledge new learning   X Able to retain information   X Capable of insight    Able to change behavior   X Progressing to goal    Other:        Comments:

## 2018-07-18 NOTE — PLAN OF CARE
Problem: Depressive Behavior With or Without Suicide Precautions:  Goal: Able to verbalize acceptance of life and situations over which he or she has no control  Able to verbalize acceptance of life and situations over which he or she has no control   Outcome: Ongoing    Goal: Able to verbalize and/or display a decrease in depressive symptoms  Able to verbalize and/or display a decrease in depressive symptoms   Outcome: Ongoing  Pt denies all except some anxiety, calm controlled cooperative with treatment, cooperative, evasive during 1:1, reports normal sleep and appetite, out in the milieu selectively social, select groups. Spontaneous safety checks to be maintained by staff.

## 2018-07-19 PROCEDURE — 1240000000 HC EMOTIONAL WELLNESS R&B

## 2018-07-19 PROCEDURE — 6360000002 HC RX W HCPCS: Performed by: PSYCHIATRY & NEUROLOGY

## 2018-07-19 PROCEDURE — 6370000000 HC RX 637 (ALT 250 FOR IP): Performed by: PSYCHIATRY & NEUROLOGY

## 2018-07-19 RX ORDER — QUETIAPINE FUMARATE 300 MG/1
300 TABLET, FILM COATED ORAL NIGHTLY
Status: DISCONTINUED | OUTPATIENT
Start: 2018-07-19 | End: 2018-07-23

## 2018-07-19 RX ADMIN — DULOXETINE HYDROCHLORIDE 60 MG: 60 CAPSULE, DELAYED RELEASE ORAL at 08:44

## 2018-07-19 RX ADMIN — MAGNESIUM HYDROXIDE 10 ML: 400 SUSPENSION ORAL at 12:37

## 2018-07-19 RX ADMIN — MAGNESIUM HYDROXIDE 10 ML: 400 SUSPENSION ORAL at 20:19

## 2018-07-19 RX ADMIN — GABAPENTIN 300 MG: 300 CAPSULE ORAL at 13:47

## 2018-07-19 RX ADMIN — PROMETHAZINE HYDROCHLORIDE 12.5 MG: 12.5 TABLET ORAL at 12:37

## 2018-07-19 RX ADMIN — PROMETHAZINE HYDROCHLORIDE 12.5 MG: 12.5 TABLET ORAL at 20:19

## 2018-07-19 RX ADMIN — BACLOFEN 10 MG: 10 TABLET ORAL at 08:44

## 2018-07-19 RX ADMIN — SUCRALFATE 1 G: 1 TABLET ORAL at 08:44

## 2018-07-19 RX ADMIN — METHADONE HYDROCHLORIDE 94 MG: 10 SOLUTION ORAL at 08:44

## 2018-07-19 RX ADMIN — ERGOCALCIFEROL 50000 UNITS: 1.25 CAPSULE ORAL at 08:44

## 2018-07-19 RX ADMIN — BACLOFEN 10 MG: 10 TABLET ORAL at 23:03

## 2018-07-19 RX ADMIN — GABAPENTIN 300 MG: 300 CAPSULE ORAL at 08:44

## 2018-07-19 RX ADMIN — CARIPRAZINE 4.5 MG: 3 CAPSULE, GELATIN COATED ORAL at 08:44

## 2018-07-19 RX ADMIN — QUETIAPINE FUMARATE 300 MG: 300 TABLET ORAL at 23:03

## 2018-07-19 RX ADMIN — GABAPENTIN 300 MG: 300 CAPSULE ORAL at 23:03

## 2018-07-19 ASSESSMENT — PAIN DESCRIPTION - LOCATION: LOCATION: GENERALIZED

## 2018-07-19 ASSESSMENT — PAIN DESCRIPTION - PAIN TYPE: TYPE: CHRONIC PAIN

## 2018-07-19 ASSESSMENT — PAIN SCALES - GENERAL
PAINLEVEL_OUTOF10: 0
PAINLEVEL_OUTOF10: 10

## 2018-07-19 NOTE — PLAN OF CARE
Problem: Anger Management/Homicidal Ideation:  Goal: Absence of homicidal ideation  Absence of homicidal ideation   Outcome: Ongoing  Pt denies HI at this time. Problem: Depressive Behavior With or Without Suicide Precautions:  Goal: Able to verbalize acceptance of life and situations over which he or she has no control  Able to verbalize acceptance of life and situations over which he or she has no control   Outcome: Ongoing    Goal: Able to verbalize and/or display a decrease in depressive symptoms  Able to verbalize and/or display a decrease in depressive symptoms   Outcome: Ongoing  Pt relates he feels better. Pt relates he is itchy d/t \"bugs coming in through the vents\" pt assured there are no bugs coming in, pt does not accept injection of reality. No s/s of bites or rash noticed.

## 2018-07-20 PROCEDURE — 6370000000 HC RX 637 (ALT 250 FOR IP): Performed by: PSYCHIATRY & NEUROLOGY

## 2018-07-20 PROCEDURE — 1240000000 HC EMOTIONAL WELLNESS R&B

## 2018-07-20 PROCEDURE — 6360000002 HC RX W HCPCS: Performed by: PSYCHIATRY & NEUROLOGY

## 2018-07-20 RX ORDER — DIPHENHYDRAMINE HCL 25 MG
25 TABLET ORAL 2 TIMES DAILY
Status: DISCONTINUED | OUTPATIENT
Start: 2018-07-20 | End: 2018-08-13 | Stop reason: HOSPADM

## 2018-07-20 RX ADMIN — CARIPRAZINE 6 MG: 3 CAPSULE, GELATIN COATED ORAL at 10:07

## 2018-07-20 RX ADMIN — SUCRALFATE 1 G: 1 TABLET ORAL at 09:22

## 2018-07-20 RX ADMIN — BACLOFEN 10 MG: 10 TABLET ORAL at 09:22

## 2018-07-20 RX ADMIN — HYDROXYZINE HYDROCHLORIDE 25 MG: 25 TABLET, FILM COATED ORAL at 00:28

## 2018-07-20 RX ADMIN — GABAPENTIN 300 MG: 300 CAPSULE ORAL at 09:22

## 2018-07-20 RX ADMIN — HYDROXYZINE HYDROCHLORIDE 25 MG: 25 TABLET, FILM COATED ORAL at 19:39

## 2018-07-20 RX ADMIN — PROMETHAZINE HYDROCHLORIDE 12.5 MG: 12.5 TABLET ORAL at 23:13

## 2018-07-20 RX ADMIN — ACETAMINOPHEN 500 MG: 500 TABLET, FILM COATED ORAL at 10:55

## 2018-07-20 RX ADMIN — DIPHENHYDRAMINE HCL 25 MG: 25 TABLET ORAL at 23:13

## 2018-07-20 RX ADMIN — GABAPENTIN 300 MG: 300 CAPSULE ORAL at 14:16

## 2018-07-20 RX ADMIN — DULOXETINE HYDROCHLORIDE 60 MG: 60 CAPSULE, DELAYED RELEASE ORAL at 10:07

## 2018-07-20 RX ADMIN — DIPHENHYDRAMINE HCL 25 MG: 25 TABLET ORAL at 13:02

## 2018-07-20 RX ADMIN — PROMETHAZINE HYDROCHLORIDE 12.5 MG: 12.5 TABLET ORAL at 14:18

## 2018-07-20 RX ADMIN — METHADONE HYDROCHLORIDE 94 MG: 10 SOLUTION ORAL at 10:07

## 2018-07-20 RX ADMIN — SUCRALFATE 1 G: 1 TABLET ORAL at 14:15

## 2018-07-20 ASSESSMENT — PAIN SCALES - GENERAL
PAINLEVEL_OUTOF10: 0
PAINLEVEL_OUTOF10: 6
PAINLEVEL_OUTOF10: 2

## 2018-07-20 ASSESSMENT — PAIN DESCRIPTION - LOCATION
LOCATION: HEAD
LOCATION: LEG

## 2018-07-20 ASSESSMENT — PAIN DESCRIPTION - PAIN TYPE: TYPE: ACUTE PAIN

## 2018-07-20 NOTE — PLAN OF CARE
Problem: Anger Management/Homicidal Ideation:  Goal: Absence of homicidal ideation  Absence of homicidal ideation   Outcome: Ongoing  Pt is free from homicidal ideations at this time. Pt agrees to feeling safe on the unit and seeking out staff for any needs. Problem: Depressive Behavior With or Without Suicide Precautions:  Goal: Able to verbalize and/or display a decrease in depressive symptoms  Able to verbalize and/or display a decrease in depressive symptoms   Outcome: Ongoing  Pt reports depression and anxiety today. Pt is paranoid and believes someone is poisoning his food. Pt also reports contaminated air coming through his ventilation in his room. Writer offered injection of doubt. Writer reassured pt and offered talk time. Pt. Remains on q15 min checks and frequent spontaneous checks throughout shift. Pt. Safety maintained.

## 2018-07-20 NOTE — PLAN OF CARE
Problem: Anger Management/Homicidal Ideation:  Goal: Absence of homicidal ideation  Absence of homicidal ideation   Outcome: Ongoing  Pt did not attend Therapeutic Recreation at 1330 d/t resting in room despite staff invitation to attend.

## 2018-07-20 NOTE — PLAN OF CARE
Problem: Depressive Behavior With or Without Suicide Precautions:  Goal: Able to verbalize and/or display a decrease in depressive symptoms  Able to verbalize and/or display a decrease in depressive symptoms   Outcome: Ongoing  Psychoeducation Group Note    Date: 7/20/2018  Start Time: 1100  End Time: 1390    Number Participants in Group:  9    Goal:  Patient will demonstrate increased interpersonal interaction. Topic:  Personal Opinions / Monica Due / Relating to Others    Discipline Responsible:   OT  AT  [facility]. X RT  Other       Participation Level:     None  Minimal    Active Listener x Interactive   x Monopolizing         Participation Quality:   Appropriate x Inappropriate          Attentive x       Intrusive   x       Sharing        Resistant          Supportive        Lethargic       Affective:   x Congruent  Incongruent  Blunted  Flat    Constricted  Anxious  Elated  Angry    Labile  Depressed  Other  Bright       Cognitive:  x Alert x Oriented PPTP     Concentration  G x F  P   Attention Span  G x F  P   Short-Term Memory  G x F  P   Long-Term Memory  G  F  P   ProblemSolving/  Decision Making  G  F x P   Ability to Process  Information  G x F  P      Contributing Factors             Delusional             Hallucinating             Flight of Ideas             Other:       Modes of Intervention:   Education x Support x Exploration   x Clarifying x Problem Solving x Confrontation   x Socialization x Limit Setting x Reality Testing   x Activity  Movement  Media    Other:            Response to Learning:  x Able to verbalize current knowledge/experience   x Able to verbalize/acknowledge new learning   x Able to retain information   x Capable of insight    Able to change behavior   x Progressing to goal    Other:        Comments:  Patient made insulting and demeaning comments towards transgender peer during group. Patient called being transgender \"retarded\" and made other instigative remarks.  Patient was

## 2018-07-21 PROCEDURE — 6370000000 HC RX 637 (ALT 250 FOR IP): Performed by: PSYCHIATRY & NEUROLOGY

## 2018-07-21 PROCEDURE — 6360000002 HC RX W HCPCS: Performed by: PSYCHIATRY & NEUROLOGY

## 2018-07-21 PROCEDURE — 1240000000 HC EMOTIONAL WELLNESS R&B

## 2018-07-21 RX ADMIN — METHADONE HYDROCHLORIDE 94 MG: 10 SOLUTION ORAL at 11:03

## 2018-07-21 RX ADMIN — DULOXETINE HYDROCHLORIDE 60 MG: 60 CAPSULE, DELAYED RELEASE ORAL at 11:00

## 2018-07-21 RX ADMIN — PANTOPRAZOLE SODIUM 40 MG: 40 TABLET, DELAYED RELEASE ORAL at 11:00

## 2018-07-21 RX ADMIN — DIPHENHYDRAMINE HCL 25 MG: 25 TABLET ORAL at 11:00

## 2018-07-21 RX ADMIN — SUCRALFATE 1 G: 1 TABLET ORAL at 11:01

## 2018-07-21 RX ADMIN — ACETAMINOPHEN 500 MG: 500 TABLET, FILM COATED ORAL at 23:04

## 2018-07-21 RX ADMIN — DIPHENHYDRAMINE HCL 25 MG: 25 TABLET ORAL at 23:04

## 2018-07-21 RX ADMIN — GABAPENTIN 300 MG: 300 CAPSULE ORAL at 11:00

## 2018-07-21 RX ADMIN — ACETAMINOPHEN 500 MG: 500 TABLET, FILM COATED ORAL at 14:41

## 2018-07-21 RX ADMIN — CARIPRAZINE 6 MG: 3 CAPSULE, GELATIN COATED ORAL at 11:00

## 2018-07-21 RX ADMIN — GABAPENTIN 300 MG: 300 CAPSULE ORAL at 01:01

## 2018-07-21 RX ADMIN — QUETIAPINE FUMARATE 300 MG: 300 TABLET ORAL at 01:05

## 2018-07-21 RX ADMIN — GABAPENTIN 300 MG: 300 CAPSULE ORAL at 14:41

## 2018-07-21 RX ADMIN — BACLOFEN 10 MG: 10 TABLET ORAL at 11:00

## 2018-07-21 RX ADMIN — PROMETHAZINE HYDROCHLORIDE 12.5 MG: 12.5 TABLET ORAL at 23:04

## 2018-07-21 ASSESSMENT — PAIN SCALES - GENERAL
PAINLEVEL_OUTOF10: 0
PAINLEVEL_OUTOF10: 3
PAINLEVEL_OUTOF10: 3
PAINLEVEL_OUTOF10: 6
PAINLEVEL_OUTOF10: 6
PAINLEVEL_OUTOF10: 4

## 2018-07-21 ASSESSMENT — PAIN DESCRIPTION - FREQUENCY: FREQUENCY: INTERMITTENT

## 2018-07-21 ASSESSMENT — PAIN DESCRIPTION - DESCRIPTORS: DESCRIPTORS: ACHING;DISCOMFORT

## 2018-07-21 ASSESSMENT — PAIN DESCRIPTION - LOCATION: LOCATION: BACK

## 2018-07-21 ASSESSMENT — PAIN DESCRIPTION - PAIN TYPE
TYPE: ACUTE PAIN
TYPE: ACUTE PAIN

## 2018-07-21 NOTE — PLAN OF CARE
actually had any thoughts of killing yourself? : YES  3) Within the past month, have you been thinking about how you might kill yourself? : YES  5) Within the past month, have you started to work out or worked out the details of how to kill yourself?  Do you intend to carry out this plan? : YES  6)  Have you ever done anything, started to do anything, or prepared to do anything to end your life?: YES  Change in Result0 Change in Plan of care0    EDUCATION:   Learner Progress Toward Treatment Goals: Reviewed results and recommendations of this team    Method: Small group    Outcome: Needs reinforcement    PATIENT GOALS: unable to identify due to patient refusal     PLAN/TREATMENT RECOMMENDATIONS UPDATE: group therapy     GOALS UPDATE:  Time frame for Short-Term Goals: 5-7 days       DIANE Kasper    Problem: Depressive Behavior With or Without Suicide Precautions:  Goal: Able to verbalize acceptance of life and situations over which he or she has no control  Able to verbalize acceptance of life and situations over which he or she has no control   Outcome: Ongoing    Goal: Able to verbalize and/or display a decrease in depressive symptoms  Able to verbalize and/or display a decrease in depressive symptoms   Outcome: Ongoing

## 2018-07-22 PROCEDURE — 6370000000 HC RX 637 (ALT 250 FOR IP): Performed by: PSYCHIATRY & NEUROLOGY

## 2018-07-22 PROCEDURE — 6360000002 HC RX W HCPCS: Performed by: PSYCHIATRY & NEUROLOGY

## 2018-07-22 PROCEDURE — 1240000000 HC EMOTIONAL WELLNESS R&B

## 2018-07-22 RX ADMIN — HYDROXYZINE HYDROCHLORIDE 25 MG: 25 TABLET, FILM COATED ORAL at 00:28

## 2018-07-22 RX ADMIN — GABAPENTIN 300 MG: 300 CAPSULE ORAL at 23:11

## 2018-07-22 RX ADMIN — METHADONE HYDROCHLORIDE 94 MG: 10 SOLUTION ORAL at 08:29

## 2018-07-22 RX ADMIN — DIPHENHYDRAMINE HCL 25 MG: 25 TABLET ORAL at 23:11

## 2018-07-22 RX ADMIN — DULOXETINE HYDROCHLORIDE 60 MG: 60 CAPSULE, DELAYED RELEASE ORAL at 08:28

## 2018-07-22 RX ADMIN — GABAPENTIN 300 MG: 300 CAPSULE ORAL at 00:28

## 2018-07-22 RX ADMIN — TRAZODONE HYDROCHLORIDE 50 MG: 50 TABLET ORAL at 00:28

## 2018-07-22 RX ADMIN — PROMETHAZINE HYDROCHLORIDE 12.5 MG: 12.5 TABLET ORAL at 23:11

## 2018-07-22 RX ADMIN — QUETIAPINE FUMARATE 300 MG: 300 TABLET ORAL at 00:28

## 2018-07-22 RX ADMIN — BACLOFEN 10 MG: 10 TABLET ORAL at 08:28

## 2018-07-22 RX ADMIN — DIPHENHYDRAMINE HCL 25 MG: 25 TABLET ORAL at 08:28

## 2018-07-22 RX ADMIN — GABAPENTIN 300 MG: 300 CAPSULE ORAL at 14:03

## 2018-07-22 RX ADMIN — BACLOFEN 10 MG: 10 TABLET ORAL at 00:27

## 2018-07-22 RX ADMIN — CARIPRAZINE 6 MG: 3 CAPSULE, GELATIN COATED ORAL at 08:27

## 2018-07-22 RX ADMIN — GABAPENTIN 300 MG: 300 CAPSULE ORAL at 08:27

## 2018-07-22 ASSESSMENT — PAIN SCALES - GENERAL
PAINLEVEL_OUTOF10: 7
PAINLEVEL_OUTOF10: 8
PAINLEVEL_OUTOF10: 7

## 2018-07-22 NOTE — BH NOTE
Patient seen in dayroom area laughing and socializing with other patients. Patient seen drinking from a cup while talking with other patient.

## 2018-07-22 NOTE — PLAN OF CARE
Problem: Depressive Behavior With or Without Suicide Precautions:  Goal: Able to verbalize acceptance of life and situations over which he or she has no control  Able to verbalize acceptance of life and situations over which he or she has no control   Outcome: Ongoing  PSYCHOEDUCATION GROUP NOTE    Date: 7/22/18  Start Time: 0845  End Time: 0900    Number Participants in Group:  10/21    Goal:  Patient will demonstrate increased interpersonal interaction   Topic: Community Meeting     Discipline Responsible:   OT  AT    [facility] x RT [facility] Other       Participation Level:     None x Minimal    Active Listener  Interactive    Monopolizing         Participation Quality:   Appropriate  Inappropriate          Attentive        Intrusive          Sharing x       Resistant          Supportive        Lethargic       Affective:    Congruent  Incongruent x Blunted  Flat    Constricted  Anxious  Elated  Angry    Labile  Depressed  Other         Cognitive:  x Alert x Oriented PPTP     Concentration  G  F x P   Attention Span  G  F x P   Short-Term Memory  G  F x P   Long-Term Memory  G  F x P   ProblemSolving/  Decision Making  G  F x P   Ability to Process  Information  G  F x P      Contributing Factors             Delusional             Hallucinating             Flight of Ideas             Other:       Modes of Intervention:  x Education x Support x Exploration   x Clarifying x Problem Solving  Confrontation   x Socialization  Limit Setting x Reality Testing    Activity  Movement  Media    Other:            Response to Learning:   Able to verbalize current knowledge/experience    Able to verbalize/acknowledge new learning    Able to retain information    Capable of insight    Able to change behavior   x Progressing to goal    Other:        Comments:

## 2018-07-22 NOTE — PLAN OF CARE
Problem: Anger Management/Homicidal Ideation:  Goal: Absence of homicidal ideation  Absence of homicidal ideation   Pt denies thoughts of harming others and is agreeable to seeking out should thoughts of harming others arise. Safe environment maintained. Q15 minute checks for safety cont per unit policy. Will cont to monitor for safety and provides support and reassurance as needed.

## 2018-07-22 NOTE — BH NOTE
Patient refused to give writer the lid to his tray and refused to eat out in the dayroom. Patient grabbed tray out of writer's hands. Writer had to follow patient to his room in order to receive lid. Patient reported \"I'm Kike [place] veronica you guys for saying I can't eat in my room. I'm worried about people putting things in my food.  \"

## 2018-07-22 NOTE — BH NOTE
The patient has restricted mood and affect. The patient has at times inappropriate affect. Patient has loose associations. He also has derailment. He tries to talk about some subject and is never able to come back to the original topic. Patient has bizarre delusions. Patient has delusions of persecution and delusions of reference. Patient is experiencing auditory hallucinations that are giving him running commentary. The patient has increased psychomotor activity. The patient has poor eye contact. The patient has poor at rapport. The patient has poor insight. His judgment is impaired. Plan: To continue current management.     Vitals:    07/22/18 0801   BP: 138/78   Pulse: 74   Resp: 14   Temp: 97.7 °F (36.5 °C)   SpO2:        Review of systems  Constitutional:  negative for chills, fevers, sweats  Respiratory:  negative for cough, dyspnea on exertion, hemoptysis, shortness of breath, wheezing  Cardiovascular:  negative for chest pain, chest pressure/discomfort, lower extremity edema, palpitations  Gastrointestinal:  negative for abdominal pain, constipation, diarrhea, nausea, vomiting  Neurological:  negative for dizziness, headache

## 2018-07-23 PROCEDURE — 6370000000 HC RX 637 (ALT 250 FOR IP): Performed by: PSYCHIATRY & NEUROLOGY

## 2018-07-23 PROCEDURE — 6360000002 HC RX W HCPCS: Performed by: PSYCHIATRY & NEUROLOGY

## 2018-07-23 PROCEDURE — 1240000000 HC EMOTIONAL WELLNESS R&B

## 2018-07-23 RX ORDER — QUETIAPINE FUMARATE 200 MG/1
200 TABLET, FILM COATED ORAL NIGHTLY
Status: DISCONTINUED | OUTPATIENT
Start: 2018-07-23 | End: 2018-07-26

## 2018-07-23 RX ADMIN — GABAPENTIN 300 MG: 300 CAPSULE ORAL at 08:40

## 2018-07-23 RX ADMIN — METHADONE HYDROCHLORIDE 94 MG: 10 SOLUTION ORAL at 09:54

## 2018-07-23 RX ADMIN — BACLOFEN 10 MG: 10 TABLET ORAL at 22:27

## 2018-07-23 RX ADMIN — DIPHENHYDRAMINE HCL 25 MG: 25 TABLET ORAL at 22:30

## 2018-07-23 RX ADMIN — DIPHENHYDRAMINE HCL 25 MG: 25 TABLET ORAL at 08:40

## 2018-07-23 RX ADMIN — ACETAMINOPHEN 500 MG: 500 TABLET, FILM COATED ORAL at 22:30

## 2018-07-23 RX ADMIN — PROMETHAZINE HYDROCHLORIDE 12.5 MG: 12.5 TABLET ORAL at 22:30

## 2018-07-23 RX ADMIN — MOMETASONE FUROATE AND FORMOTEROL FUMARATE DIHYDRATE 2 PUFF: 200; 5 AEROSOL RESPIRATORY (INHALATION) at 22:27

## 2018-07-23 RX ADMIN — LURASIDONE HYDROCHLORIDE 40 MG: 40 TABLET, FILM COATED ORAL at 17:58

## 2018-07-23 RX ADMIN — CARIPRAZINE 6 MG: 3 CAPSULE, GELATIN COATED ORAL at 08:40

## 2018-07-23 RX ADMIN — BACLOFEN 10 MG: 10 TABLET ORAL at 08:40

## 2018-07-23 RX ADMIN — GABAPENTIN 300 MG: 300 CAPSULE ORAL at 22:27

## 2018-07-23 RX ADMIN — GABAPENTIN 300 MG: 300 CAPSULE ORAL at 14:50

## 2018-07-23 RX ADMIN — QUETIAPINE FUMARATE 200 MG: 200 TABLET ORAL at 22:27

## 2018-07-23 RX ADMIN — DULOXETINE HYDROCHLORIDE 60 MG: 60 CAPSULE, DELAYED RELEASE ORAL at 08:40

## 2018-07-23 ASSESSMENT — PAIN SCALES - GENERAL
PAINLEVEL_OUTOF10: 0
PAINLEVEL_OUTOF10: 3
PAINLEVEL_OUTOF10: 0

## 2018-07-23 ASSESSMENT — PAIN DESCRIPTION - LOCATION: LOCATION: ABDOMEN

## 2018-07-23 NOTE — BH NOTE
Patient says that he is feeling helpless, useless. Patient feels overwhelmed and sad. Patient feels that he/she has no motivation or energy. Patient feels suicidal and wants to end life by overdosing on medication. Patient that people are trying to harm him by poisoning him. Patient feels that people are talking about him. He has dysphoric mood and affect. He feels that his life is a waste and heshould die. He has been hearing voices telling him to die. Plan: to continue current management and increase Cymbalta 90 mg po daily and start Latuda 40 mg with dinner.     Nate Garcia 32 y.o. male      Vitals:    07/23/18 0800   BP: 132/71   Pulse: 71   Resp: 14   Temp: 98.2 °F (36.8 °C)   SpO2:        Review of systems  Constitutional:  negative for chills, fevers, sweats  Respiratory:  negative for cough, dyspnea on exertion, hemoptysis, shortness of breath, wheezing  Cardiovascular:  negative for chest pain, chest pressure/discomfort, lower extremity edema, palpitations  Gastrointestinal:  negative for abdominal pain, constipation, diarrhea, nausea, vomiting  Neurological:  negative for dizziness, headache

## 2018-07-24 PROCEDURE — 6360000002 HC RX W HCPCS: Performed by: PSYCHIATRY & NEUROLOGY

## 2018-07-24 PROCEDURE — 1240000000 HC EMOTIONAL WELLNESS R&B

## 2018-07-24 PROCEDURE — 6370000000 HC RX 637 (ALT 250 FOR IP): Performed by: PSYCHIATRY & NEUROLOGY

## 2018-07-24 RX ADMIN — GABAPENTIN 300 MG: 300 CAPSULE ORAL at 09:18

## 2018-07-24 RX ADMIN — DIPHENHYDRAMINE HCL 25 MG: 25 TABLET ORAL at 09:23

## 2018-07-24 RX ADMIN — DIPHENHYDRAMINE HCL 25 MG: 25 TABLET ORAL at 20:37

## 2018-07-24 RX ADMIN — METHADONE HYDROCHLORIDE 94 MG: 10 SOLUTION ORAL at 09:19

## 2018-07-24 RX ADMIN — BACLOFEN 10 MG: 10 TABLET ORAL at 09:18

## 2018-07-24 RX ADMIN — PROMETHAZINE HYDROCHLORIDE 12.5 MG: 12.5 TABLET ORAL at 19:24

## 2018-07-24 RX ADMIN — SUCRALFATE 1 G: 1 TABLET ORAL at 12:31

## 2018-07-24 RX ADMIN — SUCRALFATE 1 G: 1 TABLET ORAL at 20:37

## 2018-07-24 RX ADMIN — BACLOFEN 10 MG: 10 TABLET ORAL at 20:38

## 2018-07-24 RX ADMIN — DULOXETINE HYDROCHLORIDE 90 MG: 60 CAPSULE, DELAYED RELEASE ORAL at 09:17

## 2018-07-24 RX ADMIN — CARIPRAZINE 6 MG: 3 CAPSULE, GELATIN COATED ORAL at 09:17

## 2018-07-24 RX ADMIN — ACETAMINOPHEN 500 MG: 500 TABLET, FILM COATED ORAL at 19:24

## 2018-07-24 RX ADMIN — MAGNESIUM HYDROXIDE 10 ML: 400 SUSPENSION ORAL at 19:24

## 2018-07-24 RX ADMIN — SUCRALFATE 1 G: 1 TABLET ORAL at 17:18

## 2018-07-24 RX ADMIN — GABAPENTIN 300 MG: 300 CAPSULE ORAL at 13:34

## 2018-07-24 RX ADMIN — QUETIAPINE FUMARATE 200 MG: 200 TABLET ORAL at 22:17

## 2018-07-24 RX ADMIN — LURASIDONE HYDROCHLORIDE 40 MG: 40 TABLET, FILM COATED ORAL at 17:18

## 2018-07-24 RX ADMIN — GABAPENTIN 300 MG: 300 CAPSULE ORAL at 20:37

## 2018-07-24 ASSESSMENT — PAIN SCALES - GENERAL
PAINLEVEL_OUTOF10: 6
PAINLEVEL_OUTOF10: 10
PAINLEVEL_OUTOF10: 10
PAINLEVEL_OUTOF10: 6

## 2018-07-24 ASSESSMENT — PAIN DESCRIPTION - DESCRIPTORS
DESCRIPTORS: ACHING
DESCRIPTORS: ACHING

## 2018-07-24 ASSESSMENT — PAIN DESCRIPTION - ORIENTATION
ORIENTATION: LOWER
ORIENTATION: LOWER

## 2018-07-24 ASSESSMENT — PAIN DESCRIPTION - PAIN TYPE
TYPE: CHRONIC PAIN
TYPE: CHRONIC PAIN

## 2018-07-24 ASSESSMENT — PAIN DESCRIPTION - ONSET
ONSET: AWAKENED FROM SLEEP
ONSET: AWAKENED FROM SLEEP

## 2018-07-24 ASSESSMENT — PAIN DESCRIPTION - FREQUENCY
FREQUENCY: INTERMITTENT
FREQUENCY: INTERMITTENT

## 2018-07-24 ASSESSMENT — PAIN - FUNCTIONAL ASSESSMENT
PAIN_FUNCTIONAL_ASSESSMENT: 0-10
PAIN_FUNCTIONAL_ASSESSMENT: 0-10

## 2018-07-24 ASSESSMENT — PAIN DESCRIPTION - LOCATION
LOCATION: BACK
LOCATION: BACK

## 2018-07-25 PROCEDURE — 6370000000 HC RX 637 (ALT 250 FOR IP): Performed by: PSYCHIATRY & NEUROLOGY

## 2018-07-25 PROCEDURE — 1240000000 HC EMOTIONAL WELLNESS R&B

## 2018-07-25 PROCEDURE — 6360000002 HC RX W HCPCS: Performed by: PSYCHIATRY & NEUROLOGY

## 2018-07-25 RX ADMIN — GABAPENTIN 300 MG: 300 CAPSULE ORAL at 14:15

## 2018-07-25 RX ADMIN — GABAPENTIN 300 MG: 300 CAPSULE ORAL at 10:50

## 2018-07-25 RX ADMIN — GABAPENTIN 300 MG: 300 CAPSULE ORAL at 22:42

## 2018-07-25 RX ADMIN — PANTOPRAZOLE SODIUM 40 MG: 40 TABLET, DELAYED RELEASE ORAL at 10:49

## 2018-07-25 RX ADMIN — PROMETHAZINE HYDROCHLORIDE 12.5 MG: 12.5 TABLET ORAL at 22:42

## 2018-07-25 RX ADMIN — ACETAMINOPHEN 500 MG: 500 TABLET, FILM COATED ORAL at 22:42

## 2018-07-25 RX ADMIN — METHADONE HYDROCHLORIDE 94 MG: 10 SOLUTION ORAL at 10:51

## 2018-07-25 RX ADMIN — BACLOFEN 10 MG: 10 TABLET ORAL at 10:51

## 2018-07-25 RX ADMIN — CARIPRAZINE 6 MG: 3 CAPSULE, GELATIN COATED ORAL at 10:50

## 2018-07-25 RX ADMIN — DIPHENHYDRAMINE HCL 25 MG: 25 TABLET ORAL at 10:50

## 2018-07-25 RX ADMIN — QUETIAPINE FUMARATE 200 MG: 200 TABLET ORAL at 22:42

## 2018-07-25 RX ADMIN — SUCRALFATE 1 G: 1 TABLET ORAL at 10:50

## 2018-07-25 RX ADMIN — DULOXETINE HYDROCHLORIDE 90 MG: 60 CAPSULE, DELAYED RELEASE ORAL at 10:50

## 2018-07-25 RX ADMIN — DIPHENHYDRAMINE HCL 25 MG: 25 TABLET ORAL at 22:42

## 2018-07-25 ASSESSMENT — PAIN DESCRIPTION - LOCATION
LOCATION: GENERALIZED

## 2018-07-25 ASSESSMENT — PAIN DESCRIPTION - PAIN TYPE
TYPE: CHRONIC PAIN

## 2018-07-25 ASSESSMENT — PAIN SCALES - GENERAL
PAINLEVEL_OUTOF10: 6
PAINLEVEL_OUTOF10: 5
PAINLEVEL_OUTOF10: 2
PAINLEVEL_OUTOF10: 2
PAINLEVEL_OUTOF10: 0

## 2018-07-25 NOTE — PLAN OF CARE
Problem: Anger Management/Homicidal Ideation:  Goal: Absence of homicidal ideation  Absence of homicidal ideation   Outcome: Ongoing  PT was accepting of 1:1 meet with RN. PT denied any current thoughts of homicidal ideations as of this time. PT agreed to seek out health care staff if stressors were to become to be too much. Safety checks have been maintained every 15 minutes and at irregular intervals throughout the shift. Problem: Depressive Behavior With or Without Suicide Precautions:  Goal: Able to verbalize acceptance of life and situations over which he or she has no control  Able to verbalize acceptance of life and situations over which he or she has no control   Outcome: Ongoing  PT was accepting of 1:1 meet with RN. PT was not accepting of PM program as he did not attend pm group. PT was behaviorally controlled throughout PM shift. Pt Agreed to seek out health care staff if stressors were to become to be to much.   Safety checks have been maintained every 15 minutes and at irregular intervals throughout the shift

## 2018-07-25 NOTE — PLAN OF CARE
Problem: Anger Management/Homicidal Ideation:  Goal: Absence of homicidal ideation  Absence of homicidal ideation   Outcome: Ongoing  PSYCHOEDUCATION GROUP NOTE    Date: July 25, 2018  Start Time: 1100  End Time: 1135    Number Participants in Group:  4/12    Goal:  Patient will demonstrate increased interpersonal interaction   Topic: Leisure Skills Group    Discipline Responsible:   OT  AT  [facility]. x RT MHP Other       Participation Level:     None  Minimal   x Active Listener x Interactive    Monopolizing         Participation Quality:  x Appropriate  Inappropriate   x       Attentive        Intrusive   x       Sharing        Resistant          Supportive        Lethargic       Affective:   x Congruent  Incongruent  Blunted  Flat    Constricted  Anxious  Elated  Angry    Labile  Depressed  Other         Cognitive:  x Alert x Oriented PPTP     Concentration x G  F  P   Attention Span x G  F  P   Short-Term Memory x G  F  P   Long-Term Memory x G  F  P   ProblemSolving/  Decision Making x G  F  P   Ability to Process  Information x G  F  P      Contributing Factors             Delusional             Hallucinating             Flight of Ideas             Other:       Modes of Intervention:  x Education  Support  Exploration    Clarifying x Problem Solving  Confrontation   x Socialization  Limit Setting x Reality Testing   x Activity  Movement  Media    Other:            Response to Learning:   Able to verbalize current knowledge/experience    Able to verbalize/acknowledge new learning    Able to retain information    Capable of insight    Able to change behavior   x Progressing to goal    Other:        Comments:

## 2018-07-25 NOTE — PLAN OF CARE
Problem: Anger Management/Homicidal Ideation:  Goal: Absence of homicidal ideation  Absence of homicidal ideation   Outcome: Ongoing  Psychotherapy Group Note    Date: 7/25/18  Start Time: 1000  End Time: 1014    Number Participants in Group:  3/12    Goal:  Patient will demonstrate increased interpersonal interaction   Topic: [facility]    Discipline Responsible:   OT  AT x SW  Nsg.  RT  Other       Participation Level:     None  Minimal   x Active Listener x Interactive    Monopolizing         Participation Quality:  x Appropriate  Inappropriate   x       Attentive        Intrusive   x       Sharing        Resistant   x       Supportive        Lethargic       Affective:   x Congruent  Incongruent  Blunted  Flat    Constricted  Anxious  Elated  Angry    Labile  Depressed  Other         Cognitive:  x Alert x Oriented PPTP     Concentration  G x F  P   Attention Span  G x F  P   Short-Term Memory  G x F  P   Long-Term Memory  G x F  P   ProblemSolving/  Decision Making  G x F  P   Ability to Process  Information  G x F  P      Contributing Factors             Delusional             Hallucinating             Flight of Ideas             Other:       Modes of Intervention:   Education x Support  Exploration    Clarifying  Problem Solving  Confrontation    Socialization  Limit Setting  Reality Testing    Activity  Movement  Media    Other:            Response to Learning:  x Able to verbalize current knowledge/experience   x Able to verbalize/acknowledge new learning    Able to retain information   x Capable of insight    Able to change behavior   x Progressing to goal    Other:        Comments:

## 2018-07-26 PROCEDURE — 6360000002 HC RX W HCPCS: Performed by: PSYCHIATRY & NEUROLOGY

## 2018-07-26 PROCEDURE — 6370000000 HC RX 637 (ALT 250 FOR IP): Performed by: PSYCHIATRY & NEUROLOGY

## 2018-07-26 PROCEDURE — 1240000000 HC EMOTIONAL WELLNESS R&B

## 2018-07-26 RX ORDER — QUETIAPINE FUMARATE 100 MG/1
100 TABLET, FILM COATED ORAL NIGHTLY
Status: DISCONTINUED | OUTPATIENT
Start: 2018-07-26 | End: 2018-07-28

## 2018-07-26 RX ORDER — MELOXICAM 7.5 MG/1
7.5 TABLET ORAL DAILY
Status: DISCONTINUED | OUTPATIENT
Start: 2018-07-26 | End: 2018-08-13 | Stop reason: HOSPADM

## 2018-07-26 RX ADMIN — GABAPENTIN 300 MG: 300 CAPSULE ORAL at 09:05

## 2018-07-26 RX ADMIN — BACLOFEN 10 MG: 10 TABLET ORAL at 22:21

## 2018-07-26 RX ADMIN — MAGNESIUM HYDROXIDE 10 ML: 400 SUSPENSION ORAL at 22:20

## 2018-07-26 RX ADMIN — CARIPRAZINE 6 MG: 3 CAPSULE, GELATIN COATED ORAL at 09:04

## 2018-07-26 RX ADMIN — SUCRALFATE 1 G: 1 TABLET ORAL at 22:21

## 2018-07-26 RX ADMIN — DULOXETINE HYDROCHLORIDE 90 MG: 60 CAPSULE, DELAYED RELEASE ORAL at 09:04

## 2018-07-26 RX ADMIN — GABAPENTIN 300 MG: 300 CAPSULE ORAL at 22:21

## 2018-07-26 RX ADMIN — QUETIAPINE FUMARATE 100 MG: 100 TABLET ORAL at 22:22

## 2018-07-26 RX ADMIN — DIPHENHYDRAMINE HCL 25 MG: 25 TABLET ORAL at 09:04

## 2018-07-26 RX ADMIN — MELOXICAM 7.5 MG: 7.5 TABLET ORAL at 15:51

## 2018-07-26 RX ADMIN — METHADONE HYDROCHLORIDE 94 MG: 10 SOLUTION ORAL at 11:11

## 2018-07-26 RX ADMIN — PROMETHAZINE HYDROCHLORIDE 12.5 MG: 12.5 TABLET ORAL at 22:21

## 2018-07-26 RX ADMIN — GABAPENTIN 300 MG: 300 CAPSULE ORAL at 15:50

## 2018-07-26 RX ADMIN — ERGOCALCIFEROL 50000 UNITS: 1.25 CAPSULE ORAL at 09:05

## 2018-07-26 RX ADMIN — LURASIDONE HYDROCHLORIDE 80 MG: 80 TABLET, FILM COATED ORAL at 15:51

## 2018-07-26 RX ADMIN — BACLOFEN 10 MG: 10 TABLET ORAL at 09:05

## 2018-07-26 RX ADMIN — DIPHENHYDRAMINE HCL 25 MG: 25 TABLET ORAL at 22:21

## 2018-07-26 ASSESSMENT — PAIN SCALES - GENERAL
PAINLEVEL_OUTOF10: 6
PAINLEVEL_OUTOF10: 9
PAINLEVEL_OUTOF10: 9
PAINLEVEL_OUTOF10: 0
PAINLEVEL_OUTOF10: 7

## 2018-07-26 ASSESSMENT — PAIN DESCRIPTION - ORIENTATION: ORIENTATION: RIGHT;LEFT

## 2018-07-26 ASSESSMENT — PAIN DESCRIPTION - LOCATION: LOCATION: LEG

## 2018-07-26 NOTE — PLAN OF CARE
Problem: Anger Management/Homicidal Ideation:  Goal: Absence of homicidal ideation  Absence of homicidal ideation   Outcome: Ongoing  Pt did not attend [employer] Group at 0915 d/t resting in room despite staff invitation to attend.

## 2018-07-26 NOTE — BH NOTE
Patient has delusions of persecution and delusions of reference. He is experiencing auditory hallucinations. He is actively responding to internal stimuli. He feels that he has nothing in his life. He feels that there is no one in his life. He is unhappy that his life has no purpose. He has no insight and his judgement is impaired. He is anxious and agitated for no reason. He is refusing to take his medication. Plan: to continue current management. To increase his Latuda to 80 mg po with dinner.   Mikaela Khoury 32 y.o. male      Vitals:    07/26/18 0830   BP: 126/72   Pulse: 70   Resp: 14   Temp: 98.1 °F (36.7 °C)   SpO2: 91%       Review of systems  Constitutional:  negative for chills, fevers, sweats  Respiratory:  negative for cough, dyspnea on exertion, hemoptysis, shortness of breath, wheezing  Cardiovascular:  negative for chest pain, chest pressure/discomfort, lower extremity edema, palpitations  Gastrointestinal:  negative for abdominal pain, constipation, diarrhea, nausea, vomiting  Neurological:  negative for dizziness, headache

## 2018-07-26 NOTE — PLAN OF CARE
Problem: Anger Management/Homicidal Ideation:  Goal: Absence of homicidal ideation  Absence of homicidal ideation   Outcome: Ongoing  Pt. remains free from self harm and is safe on the unit. Pt. denies any suicidal or homicidal ideations and reports no auditory/visual hallucinations. Pt. Remains isolative to his room. Pt. Remains paranoid and suspicious upon approach. Q15min checks continued for safety. Problem: Depressive Behavior With or Without Suicide Precautions:  Goal: Able to verbalize and/or display a decrease in depressive symptoms  Able to verbalize and/or display a decrease in depressive symptoms   Outcome: Ongoing  Pt. Denies any feelings of anxiety or depression and has been isolative to his room for long periods. Pt. Is paranoid and places items in front of his door to alert him when someone enters. Pt. Is instructed to remove items to avoid any safety hazards. Pt. Is compliant with redirection. Q15min checks continued for safety.

## 2018-07-26 NOTE — PLAN OF CARE
Problem: Anger Management/Homicidal Ideation:  Goal: Absence of homicidal ideation  Absence of homicidal ideation   Outcome: Ongoing  Pt did not attend Therapeutic Recreation at 1100 d/t resting in room despite staff invitation to attend.

## 2018-07-26 NOTE — PLAN OF CARE
Problem: Depressive Behavior With or Without Suicide Precautions:  Goal: Able to verbalize acceptance of life and situations over which he or she has no control  Able to verbalize acceptance of life and situations over which he or she has no control   Outcome: Ongoing  Pt did not attend RT group at 1430 d/t resting in room despite staff invitation to attend.

## 2018-07-27 PROCEDURE — 1240000000 HC EMOTIONAL WELLNESS R&B

## 2018-07-27 PROCEDURE — 6360000002 HC RX W HCPCS: Performed by: PSYCHIATRY & NEUROLOGY

## 2018-07-27 PROCEDURE — 6370000000 HC RX 637 (ALT 250 FOR IP): Performed by: PSYCHIATRY & NEUROLOGY

## 2018-07-27 RX ADMIN — CARIPRAZINE 6 MG: 3 CAPSULE, GELATIN COATED ORAL at 08:37

## 2018-07-27 RX ADMIN — MAGNESIUM HYDROXIDE 10 ML: 400 SUSPENSION ORAL at 21:54

## 2018-07-27 RX ADMIN — LURASIDONE HYDROCHLORIDE 80 MG: 80 TABLET, FILM COATED ORAL at 17:00

## 2018-07-27 RX ADMIN — METHADONE HYDROCHLORIDE 94 MG: 10 SOLUTION ORAL at 09:37

## 2018-07-27 RX ADMIN — DIPHENHYDRAMINE HCL 25 MG: 25 TABLET ORAL at 08:38

## 2018-07-27 RX ADMIN — BACLOFEN 10 MG: 10 TABLET ORAL at 21:55

## 2018-07-27 RX ADMIN — GABAPENTIN 300 MG: 300 CAPSULE ORAL at 21:54

## 2018-07-27 RX ADMIN — DIPHENHYDRAMINE HCL 25 MG: 25 TABLET ORAL at 21:54

## 2018-07-27 RX ADMIN — DULOXETINE HYDROCHLORIDE 90 MG: 60 CAPSULE, DELAYED RELEASE ORAL at 08:37

## 2018-07-27 RX ADMIN — BACLOFEN 10 MG: 10 TABLET ORAL at 08:37

## 2018-07-27 RX ADMIN — PROMETHAZINE HYDROCHLORIDE 12.5 MG: 12.5 TABLET ORAL at 21:55

## 2018-07-27 RX ADMIN — QUETIAPINE FUMARATE 100 MG: 100 TABLET ORAL at 21:55

## 2018-07-27 RX ADMIN — MOMETASONE FUROATE AND FORMOTEROL FUMARATE DIHYDRATE 2 PUFF: 200; 5 AEROSOL RESPIRATORY (INHALATION) at 21:53

## 2018-07-27 RX ADMIN — GABAPENTIN 300 MG: 300 CAPSULE ORAL at 08:38

## 2018-07-27 RX ADMIN — ACETAMINOPHEN 500 MG: 500 TABLET, FILM COATED ORAL at 21:54

## 2018-07-27 RX ADMIN — MELOXICAM 7.5 MG: 7.5 TABLET ORAL at 08:38

## 2018-07-27 RX ADMIN — SUCRALFATE 1 G: 1 TABLET ORAL at 17:00

## 2018-07-27 ASSESSMENT — PAIN SCALES - GENERAL
PAINLEVEL_OUTOF10: 0
PAINLEVEL_OUTOF10: 6
PAINLEVEL_OUTOF10: 0

## 2018-07-27 NOTE — PLAN OF CARE
Problem: Depressive Behavior With or Without Suicide Precautions:  Goal: Able to verbalize acceptance of life and situations over which he or she has no control  Able to verbalize acceptance of life and situations over which he or she has no control   Patient is denying all, very irritable with staff while being redirected, continues to pile things in front of his door. Took morning meds, refused all other meds throughout the day. Patient safety maintained through Q15 min and random safety checks. Problem: Falls - Risk of:  Goal: Will remain free from falls  Will remain free from falls   Pt remains free of falls and verbalizes understanding of individual fall risks. Pt wearing non skid footwear and encouraged to seek out staff for any assistance needed.

## 2018-07-27 NOTE — PLAN OF CARE
Problem: Falls - Risk of:  Goal: Will remain free from falls  Will remain free from falls   Outcome: Ongoing  Pt remains free of falls and verbalizes understanding of individual fall risks. Pt wearing non skid footwear and encouraged to seek out staff for any assistance needed.     Goal: Absence of physical injury  Absence of physical injury   Outcome: Ongoing  Patient is absent of physical harm this shift

## 2018-07-27 NOTE — PLAN OF CARE
Problem: Anger Management/Homicidal Ideation:  Goal: Absence of homicidal ideation  Absence of homicidal ideation   Outcome: Ongoing  Pt did not attend Community Meeting at 0900 d/t resting in room despite staff invitation to attend.

## 2018-07-28 PROCEDURE — 6360000002 HC RX W HCPCS: Performed by: PSYCHIATRY & NEUROLOGY

## 2018-07-28 PROCEDURE — 1240000000 HC EMOTIONAL WELLNESS R&B

## 2018-07-28 PROCEDURE — 6370000000 HC RX 637 (ALT 250 FOR IP): Performed by: PSYCHIATRY & NEUROLOGY

## 2018-07-28 RX ORDER — AMITRIPTYLINE HYDROCHLORIDE 25 MG/1
25 TABLET, FILM COATED ORAL NIGHTLY
Status: DISCONTINUED | OUTPATIENT
Start: 2018-07-28 | End: 2018-07-31

## 2018-07-28 RX ORDER — ARIPIPRAZOLE 5 MG/1
5 TABLET ORAL DAILY
Status: DISCONTINUED | OUTPATIENT
Start: 2018-07-28 | End: 2018-07-31

## 2018-07-28 RX ORDER — DOXYCYCLINE 100 MG/1
100 CAPSULE ORAL DAILY
Status: DISCONTINUED | OUTPATIENT
Start: 2018-07-28 | End: 2018-08-13 | Stop reason: HOSPADM

## 2018-07-28 RX ADMIN — ARIPIPRAZOLE 5 MG: 5 TABLET ORAL at 10:51

## 2018-07-28 RX ADMIN — GABAPENTIN 300 MG: 300 CAPSULE ORAL at 09:16

## 2018-07-28 RX ADMIN — MELOXICAM 7.5 MG: 7.5 TABLET ORAL at 09:16

## 2018-07-28 RX ADMIN — DULOXETINE HYDROCHLORIDE 90 MG: 60 CAPSULE, DELAYED RELEASE ORAL at 09:15

## 2018-07-28 RX ADMIN — BACLOFEN 10 MG: 10 TABLET ORAL at 09:16

## 2018-07-28 RX ADMIN — CARIPRAZINE 6 MG: 3 CAPSULE, GELATIN COATED ORAL at 09:15

## 2018-07-28 RX ADMIN — PROMETHAZINE HYDROCHLORIDE 12.5 MG: 12.5 TABLET ORAL at 16:12

## 2018-07-28 RX ADMIN — MAGNESIUM HYDROXIDE 10 ML: 400 SUSPENSION ORAL at 16:12

## 2018-07-28 RX ADMIN — DIPHENHYDRAMINE HCL 25 MG: 25 TABLET ORAL at 09:16

## 2018-07-28 RX ADMIN — METHADONE HYDROCHLORIDE 94 MG: 10 SOLUTION ORAL at 10:14

## 2018-07-28 RX ADMIN — GABAPENTIN 300 MG: 300 CAPSULE ORAL at 14:54

## 2018-07-28 RX ADMIN — DOXYCYCLINE 100 MG: 100 CAPSULE ORAL at 10:51

## 2018-07-28 ASSESSMENT — PAIN SCALES - GENERAL
PAINLEVEL_OUTOF10: 0
PAINLEVEL_OUTOF10: 10
PAINLEVEL_OUTOF10: 10
PAINLEVEL_OUTOF10: 9

## 2018-07-28 ASSESSMENT — PAIN DESCRIPTION - PAIN TYPE: TYPE: CHRONIC PAIN

## 2018-07-28 ASSESSMENT — PAIN DESCRIPTION - DESCRIPTORS: DESCRIPTORS: ACHING

## 2018-07-28 ASSESSMENT — PAIN DESCRIPTION - FREQUENCY: FREQUENCY: INTERMITTENT

## 2018-07-28 ASSESSMENT — PAIN DESCRIPTION - ORIENTATION: ORIENTATION: RIGHT;LEFT

## 2018-07-28 NOTE — BH NOTE
Patient is having psychotic symptoms such as delusions of persecution and delusions of reference. He believes that people are sending demons to kill him. He is hearing voices making derogatory remarks. Some of the voices are telling him to beat up people. Some voices are telling him to kill himself and die. He complains of agitation and irritability. He feels that he is going to explode and beat up someone. He has poor eye contact. He has poor rapport. He has no insight. His judgement is impaired. Plan: to continue to monitor his progress. Supportive and insight oriented therapy were provided.   Mikaela Khoury 32 y.o. male      Vitals:    07/28/18 0822   BP: 125/72   Pulse: 66   Resp: 14   Temp:    SpO2: 100%       Review of systems  Constitutional:  negative for chills, fevers, sweats  Respiratory:  negative for cough, dyspnea on exertion, hemoptysis, shortness of breath, wheezing  Cardiovascular:  negative for chest pain, chest pressure/discomfort, lower extremity edema, palpitations  Gastrointestinal:  negative for abdominal pain, constipation, diarrhea, nausea, vomiting  Neurological:  negative for dizziness, headache

## 2018-07-28 NOTE — PLAN OF CARE
Problem: Anger Management/Homicidal Ideation:  Goal: Absence of homicidal ideation  Absence of homicidal ideation   Outcome: Ongoing  Pt is seclusive to room, aloof of peers. Remains evasive and paranoid. Preoccupied in thought. Admits to anxiety and depression. Somatic focus. He is medication compliant. No agitation noted, cooperative on unit. Denies any HI, Si, or hallucinations. Out for needs only. Refuses any programming or groups. Assess mood and mental status. Checks q 15 mins.  Monitor behaviors Encourage groups and adl's,

## 2018-07-28 NOTE — PLAN OF CARE
Problem: Depressive Behavior With or Without Suicide Precautions:  Goal: Able to verbalize acceptance of life and situations over which he or she has no control  Able to verbalize acceptance of life and situations over which he or she has no control   Psychoeducation Group Note    Date: 7/28/18  Start Time: 1330  End Time: 1415    Number Participants in Group:  7/14    Goal:  Patient will demonstrate increased interpersonal interaction   Topic: trivia skills, socialization and communication    Discipline Responsible:   OT  AT    Ns. x RT  Other       Participation Level:     None  Minimal   x Active Listener x Interactive    Monopolizing         Participation Quality:  x Appropriate  Inappropriate   x       Attentive        Intrusive          Sharing        Resistant          Supportive        Lethargic       Affective:   x Congruent  Incongruent  Blunted  Flat    Constricted  Anxious  Elated  Angry    Labile  Depressed  Other         Cognitive:  x Alert x Oriented PPTP     Concentration x G  F  P   Attention Span x G  F  P   Short-Term Memory  G  F  P   Long-Term Memory  G  F  P   ProblemSolving/  Decision Making x G  F  P   Ability to Process  Information x G  F  P      Contributing Factors             Delusional             Hallucinating             Flight of Ideas             Other:       Modes of Intervention:  x Education x Support x Exploration    Clarifying x Problem Solving  Confrontation   x Socialization  Limit Setting  Reality Testing   x Activity  Movement  Media    Other:            Response to Learning:  x Able to verbalize current knowledge/experience    Able to verbalize/acknowledge new learning   x Able to retain information   x Capable of insight    Able to change behavior   x Progressing to goal    Other:        Comments:

## 2018-07-29 PROCEDURE — 6360000002 HC RX W HCPCS: Performed by: PSYCHIATRY & NEUROLOGY

## 2018-07-29 PROCEDURE — 1240000000 HC EMOTIONAL WELLNESS R&B

## 2018-07-29 PROCEDURE — 6370000000 HC RX 637 (ALT 250 FOR IP): Performed by: PSYCHIATRY & NEUROLOGY

## 2018-07-29 RX ORDER — OLANZAPINE 10 MG/1
10 INJECTION, POWDER, LYOPHILIZED, FOR SOLUTION INTRAMUSCULAR ONCE
Status: DISCONTINUED | OUTPATIENT
Start: 2018-07-29 | End: 2018-08-03 | Stop reason: SDUPTHER

## 2018-07-29 RX ADMIN — MAGNESIUM HYDROXIDE 10 ML: 400 SUSPENSION ORAL at 13:41

## 2018-07-29 RX ADMIN — ACETAMINOPHEN 500 MG: 500 TABLET, FILM COATED ORAL at 08:38

## 2018-07-29 RX ADMIN — MELOXICAM 7.5 MG: 7.5 TABLET ORAL at 08:02

## 2018-07-29 RX ADMIN — CARIPRAZINE 6 MG: 3 CAPSULE, GELATIN COATED ORAL at 08:02

## 2018-07-29 RX ADMIN — TRAZODONE HYDROCHLORIDE 50 MG: 50 TABLET ORAL at 22:54

## 2018-07-29 RX ADMIN — DULOXETINE HYDROCHLORIDE 90 MG: 60 CAPSULE, DELAYED RELEASE ORAL at 08:02

## 2018-07-29 RX ADMIN — BACLOFEN 10 MG: 10 TABLET ORAL at 08:02

## 2018-07-29 RX ADMIN — PROMETHAZINE HYDROCHLORIDE 12.5 MG: 12.5 TABLET ORAL at 13:41

## 2018-07-29 RX ADMIN — GABAPENTIN 300 MG: 300 CAPSULE ORAL at 08:02

## 2018-07-29 RX ADMIN — GABAPENTIN 300 MG: 300 CAPSULE ORAL at 22:53

## 2018-07-29 RX ADMIN — GABAPENTIN 300 MG: 300 CAPSULE ORAL at 13:41

## 2018-07-29 RX ADMIN — ACETAMINOPHEN 500 MG: 500 TABLET, FILM COATED ORAL at 13:41

## 2018-07-29 RX ADMIN — AMITRIPTYLINE HYDROCHLORIDE 25 MG: 25 TABLET, FILM COATED ORAL at 22:52

## 2018-07-29 RX ADMIN — HYDROXYZINE HYDROCHLORIDE 25 MG: 25 TABLET, FILM COATED ORAL at 11:34

## 2018-07-29 RX ADMIN — BACLOFEN 10 MG: 10 TABLET ORAL at 22:53

## 2018-07-29 RX ADMIN — ARIPIPRAZOLE 5 MG: 5 TABLET ORAL at 08:01

## 2018-07-29 RX ADMIN — DOXYCYCLINE 100 MG: 100 CAPSULE ORAL at 08:01

## 2018-07-29 RX ADMIN — DIPHENHYDRAMINE HCL 25 MG: 25 TABLET ORAL at 08:01

## 2018-07-29 RX ADMIN — HYDROXYZINE HYDROCHLORIDE 25 MG: 25 TABLET, FILM COATED ORAL at 22:53

## 2018-07-29 RX ADMIN — DIPHENHYDRAMINE HCL 25 MG: 25 TABLET ORAL at 22:53

## 2018-07-29 RX ADMIN — METHADONE HYDROCHLORIDE 94 MG: 10 SOLUTION ORAL at 09:05

## 2018-07-29 RX ADMIN — ACETAMINOPHEN 500 MG: 500 TABLET, FILM COATED ORAL at 22:53

## 2018-07-29 RX ADMIN — PROMETHAZINE HYDROCHLORIDE 12.5 MG: 12.5 TABLET ORAL at 22:52

## 2018-07-29 ASSESSMENT — PAIN SCALES - GENERAL
PAINLEVEL_OUTOF10: 3
PAINLEVEL_OUTOF10: 8
PAINLEVEL_OUTOF10: 8
PAINLEVEL_OUTOF10: 0
PAINLEVEL_OUTOF10: 3
PAINLEVEL_OUTOF10: 8
PAINLEVEL_OUTOF10: 6
PAINLEVEL_OUTOF10: 6
PAINLEVEL_OUTOF10: 3
PAINLEVEL_OUTOF10: 0

## 2018-07-29 NOTE — PLAN OF CARE
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 Changes to plan of Care no    Status EXAM:   Status and Exam  Normal: No  Facial Expression: Worried, Flat, Exaggerated, Sad  Affect: Blunt, Incongruent  Level of Consciousness: Alert  Mood:Normal: No  Mood: Depressed, Anxious, Suspicious, Irritable  Motor Activity:Normal: No  Motor Activity: Decreased  Interview Behavior: Evasive, Irritable, Uncooperative/Withdrawn  Preception: [place] to Person, [place] to Time, [place] to Place, [place] to Situation  Attention:Normal: No  Attention: Distractible  Thought Processes: Blocking, Loose Assoc.   Thought Content:Normal: No  Thought Content: Poverty of Content, Preoccupations, Paranoia  Hallucinations: None  Delusions: No  Delusions: Persecution  Memory:Normal: No  Memory: Poor Recent, Confabulation, Poor Remote  Insight and Judgment: No  Insight and Judgment: Poor Judgment, Poor Insight  Present Suicidal Ideation: No  Present Homicidal Ideation: No    Daily Assessment Last Entry:   Daily Sleep (WDL): Within Defined Limits         Patient Currently in Pain: Denies  Daily Nutrition (WDL): Within Defined Limits    Patient Monitoring:  Frequency of Checks: 4 times per hour, close    Psychiatric Symptoms:   Depression Symptoms  Depression Symptoms: Feelings of helplessness, Feelings of hopelessess, Isolative, Loss of interest, Impaired concentration, Increased irritability  Anxiety Symptoms  Anxiety Symptoms: Generalized  Elizabeth Symptoms  Elizabeth Symptoms: Poor judgment     Psychosis Symptoms  Delusion Type: Somatic, Paranoid, Persecutory    Suicide Risk CSSR-S:  1) Within the past month, have you wished you were dead or wished you could go to sleep and not wake up? : YES  2) Within

## 2018-07-29 NOTE — PLAN OF CARE
Problem: Anger Management/Homicidal Ideation:  Goal: Absence of homicidal ideation  Absence of homicidal ideation   Outcome: Ongoing  Pt denies homicidal ideations. Pt makes a verbal threat to act up so he can get an injectable medication. Firm limits set. Problem: Depressive Behavior With or Without Suicide Precautions:  Goal: Able to verbalize acceptance of life and situations over which he or she has no control  Able to verbalize acceptance of life and situations over which he or she has no control   Outcome: Ongoing  Pt is un accepting of responsibility for his behaviors. blames staff and others. Expresses paranoia. Shows no insight. Pt is very medication focused. Requesting all available as needed medications. Complains of anxiety not depression. Goal: Able to verbalize and/or display a decrease in depressive symptoms  Able to verbalize and/or display a decrease in depressive symptoms   Outcome: Ongoing      Problem: Falls - Risk of:  Goal: Will remain free from falls  Will remain free from falls   Outcome: Ongoing  Pt remains free of falls and verbalizes understanding of individual fall risks. Pt wearing non skid footwear and encouraged to seek out staff for any assistance needed.

## 2018-07-29 NOTE — BH NOTE
Upon entering patient's room, bed was raised to highest level. Attempted to educate patient that bed cannot be set to highest level. Also found  Multiple food trays in patients room. Patient became verbally aggressive towards staff. Patient currently in dayroom.

## 2018-07-30 PROCEDURE — 1240000000 HC EMOTIONAL WELLNESS R&B

## 2018-07-30 PROCEDURE — 6360000002 HC RX W HCPCS: Performed by: PSYCHIATRY & NEUROLOGY

## 2018-07-30 PROCEDURE — 6370000000 HC RX 637 (ALT 250 FOR IP): Performed by: PSYCHIATRY & NEUROLOGY

## 2018-07-30 RX ADMIN — DIPHENHYDRAMINE HCL 25 MG: 25 TABLET ORAL at 09:04

## 2018-07-30 RX ADMIN — BACLOFEN 10 MG: 10 TABLET ORAL at 09:04

## 2018-07-30 RX ADMIN — GABAPENTIN 300 MG: 300 CAPSULE ORAL at 14:12

## 2018-07-30 RX ADMIN — PROMETHAZINE HYDROCHLORIDE 12.5 MG: 12.5 TABLET ORAL at 21:11

## 2018-07-30 RX ADMIN — MELOXICAM 7.5 MG: 7.5 TABLET ORAL at 09:04

## 2018-07-30 RX ADMIN — PROMETHAZINE HYDROCHLORIDE 12.5 MG: 12.5 TABLET ORAL at 14:12

## 2018-07-30 RX ADMIN — ACETAMINOPHEN 500 MG: 500 TABLET, FILM COATED ORAL at 09:03

## 2018-07-30 RX ADMIN — MAGNESIUM HYDROXIDE 10 ML: 400 SUSPENSION ORAL at 14:12

## 2018-07-30 RX ADMIN — DOXYCYCLINE 100 MG: 100 CAPSULE ORAL at 09:04

## 2018-07-30 RX ADMIN — PANTOPRAZOLE SODIUM 40 MG: 40 TABLET, DELAYED RELEASE ORAL at 09:04

## 2018-07-30 RX ADMIN — DULOXETINE HYDROCHLORIDE 90 MG: 60 CAPSULE, DELAYED RELEASE ORAL at 09:04

## 2018-07-30 RX ADMIN — METHADONE HYDROCHLORIDE 94 MG: 10 SOLUTION ORAL at 09:06

## 2018-07-30 RX ADMIN — GABAPENTIN 300 MG: 300 CAPSULE ORAL at 09:04

## 2018-07-30 RX ADMIN — GABAPENTIN 300 MG: 300 CAPSULE ORAL at 21:11

## 2018-07-30 RX ADMIN — BACLOFEN 10 MG: 10 TABLET ORAL at 21:11

## 2018-07-30 RX ADMIN — ARIPIPRAZOLE 5 MG: 5 TABLET ORAL at 09:04

## 2018-07-30 RX ADMIN — CARIPRAZINE 6 MG: 3 CAPSULE, GELATIN COATED ORAL at 14:12

## 2018-07-30 RX ADMIN — ACETAMINOPHEN 500 MG: 500 TABLET, FILM COATED ORAL at 18:16

## 2018-07-30 RX ADMIN — MAGNESIUM HYDROXIDE 10 ML: 400 SUSPENSION ORAL at 21:10

## 2018-07-30 RX ADMIN — DIPHENHYDRAMINE HCL 25 MG: 25 TABLET ORAL at 21:11

## 2018-07-30 ASSESSMENT — PAIN SCALES - GENERAL
PAINLEVEL_OUTOF10: 5
PAINLEVEL_OUTOF10: 5
PAINLEVEL_OUTOF10: 6
PAINLEVEL_OUTOF10: 6
PAINLEVEL_OUTOF10: 5

## 2018-07-30 NOTE — PLAN OF CARE
Problem: Pain:  Goal: Pain level will decrease  Pain level will decrease   Outcome: Ongoing  Pt frequently complains of pain. Goal: Control of acute pain  Control of acute pain   Outcome: Ongoing    Goal: Control of chronic pain  Control of chronic pain   Outcome: Ongoing      Problem: Anger Management/Homicidal Ideation:  Goal: Absence of homicidal ideation  Absence of homicidal ideation   Outcome: Ongoing  Pt is controlled. Irritable with charge nurse in the morning due to rule enforcement. Pt continues to express paranoia. Problem: Depressive Behavior With or Without Suicide Precautions:  Goal: Able to verbalize acceptance of life and situations over which he or she has no control  Able to verbalize acceptance of life and situations over which he or she has no control   Outcome: Ongoing  Denies suicidal or homicidal ideations    Problem: Falls - Risk of:  Goal: Will remain free from falls  Will remain free from falls   Outcome: Ongoing  Pt remains free of falls and verbalizes understanding of individual fall risks. Pt wearing non skid footwear and encouraged to seek out staff for any assistance needed.

## 2018-07-30 NOTE — PLAN OF CARE
Problem: Anger Management/Homicidal Ideation:  Goal: Absence of homicidal ideation  Absence of homicidal ideation   Outcome: Ongoing  PSYCHOEDUCATION GROUP NOTE    Date: 07/30/18  Start Time: 1330  End Time: 1410    Number Participants in Group:  7/16    Goal:  Patient will demonstrate increased interpersonal interaction   Topic: Coping Skills/Positive Thinking/Self-Esteem    Discipline Responsible:   OT  AT    Ns. x RT MHP Other       Participation Level:     None x Minimal    Active Listener  Interactive    Monopolizing         Participation Quality:  x Appropriate  Inappropriate          Attentive        Intrusive   x       Sharing        Resistant          Supportive        Lethargic       Affective:    Congruent  Incongruent x Blunted  Flat    Constricted  Anxious  Elated  Angry    Labile  Depressed  Other         Cognitive:  x Alert x Oriented PPTP     Concentration  G x F  P   Attention Span  G x F  P   Short-Term Memory  G x F  P   Long-Term Memory  G x F  P   ProblemSolving/  Decision Making  G x F  P   Ability to Process  Information  G x F  P      Contributing Factors             Delusional             Hallucinating             Flight of Ideas             Other:       Modes of Intervention:  x Education  Support x Exploration    Clarifying x Problem Solving  Confrontation   x Socialization  Limit Setting x Reality Testing   x Activity  Movement  Media    Other:            Response to Learning:   Able to verbalize current knowledge/experience    Able to verbalize/acknowledge new learning    Able to retain information    Capable of insight    Able to change behavior   x Progressing to goal    Other:        Comments: Patient was in group and was participating minimally, however, patient left after about twenty minutes.

## 2018-07-30 NOTE — PLAN OF CARE
Problem: Depressive Behavior With or Without Suicide Precautions:  Goal: Able to verbalize acceptance of life and situations over which he or she has no control  Able to verbalize acceptance of life and situations over which he or she has no control   Outcome: Ongoing  PSYCHOEDUCATION GROUP NOTE    Date: 7/30/18  Start Time: 1430  End Time: 1515    Number Participants in Group:  9/16    Goal:  Patient will demonstrate increased interpersonal interaction   Topic: Self Awareness/Socialization    Discipline Responsible:   OT  AT    Ns. x RT MHP Other       Participation Level:     None  Minimal   x Active Listener x Interactive    Monopolizing         Participation Quality:  x Appropriate  Inappropriate   x       Attentive        Intrusive   x       Sharing        Resistant          Supportive        Lethargic       Affective:    Congruent  Incongruent  Blunted  Flat   x Constricted  Anxious  Elated  Angry    Labile  Depressed  Other         Cognitive:  x Alert x Oriented PPTP     Concentration x G  F  P   Attention Span x G  F  P   Short-Term Memory x G  F  P   Long-Term Memory x G  F  P   ProblemSolving/  Decision Making x G  F  P   Ability to Process  Information x G  F  P      Contributing Factors             Delusional             Hallucinating             Flight of Ideas             Other:       Modes of Intervention:   Education x Support x Exploration    Clarifying x Problem Solving  Confrontation   x Socialization  Limit Setting x Reality Testing   x Activity  Movement x Media    Other:            Response to Learning:  x Able to verbalize current knowledge/experience    Able to verbalize/acknowledge new learning    Able to retain information   x Capable of insight    Able to change behavior   x Progressing to goal    Other:        Comments:

## 2018-07-30 NOTE — PLAN OF CARE
Problem: Anger Management/Homicidal Ideation:  Goal: Absence of homicidal ideation  Absence of homicidal ideation   Outcome: Ongoing  PSYCHOEDUCATION GROUP NOTE    Date: 07/30/18  Start Time: 0900  End Time: 0925    Number Participants in Group:  8/14    Goal:  Patient will demonstrate increased interpersonal interaction   Topic: Community Meeting and Stretching Group    Discipline Responsible:   OT  AT  [facility]. x RT MHP Other       Participation Level:    x None  Minimal    Active Listener  Interactive    Monopolizing         Participation Quality:   Appropriate  Inappropriate          Attentive        Intrusive          Sharing x       Resistant          Supportive        Lethargic       Affective:    Congruent  Incongruent  Blunted x Flat    Constricted  Anxious  Elated  Angry    Labile  Depressed  Other         Cognitive:  x Alert x Oriented PPTP     Concentration  G  F  P   Attention Span  G  F  P   Short-Term Memory  G  F  P   Long-Term Memory  G  F  P   ProblemSolving/  Decision Making  G  F  P   Ability to Process  Information  G  F  P      Contributing Factors             Delusional             Hallucinating             Flight of Ideas             Other:       Modes of Intervention:  x Education  Support x Exploration    Clarifying x Problem Solving  Confrontation   x Socialization  Limit Setting x Reality Testing   x Activity x Movement  Media    Other:            Response to Learning:   Able to verbalize current knowledge/experience    Able to verbalize/acknowledge new learning    Able to retain information    Capable of insight    Able to change behavior   x Progressing to goal    Other:        Comments: Patient came to group, however, refused to participate. Patient came to group five minutes late and left after ten minutes.

## 2018-07-30 NOTE — PLAN OF CARE
Problem: Anger Management/Homicidal Ideation:  Goal: Absence of homicidal ideation  Absence of homicidal ideation   Outcome: Ongoing  Pt declined to attend psychotherapy at 1000 am despite encouragement. Pt offered 1:1 and  refused.

## 2018-07-31 PROCEDURE — 1240000000 HC EMOTIONAL WELLNESS R&B

## 2018-07-31 PROCEDURE — 6370000000 HC RX 637 (ALT 250 FOR IP): Performed by: PSYCHIATRY & NEUROLOGY

## 2018-07-31 PROCEDURE — 6360000002 HC RX W HCPCS: Performed by: PSYCHIATRY & NEUROLOGY

## 2018-07-31 RX ORDER — OLANZAPINE 5 MG/1
5 TABLET ORAL NIGHTLY
Status: DISCONTINUED | OUTPATIENT
Start: 2018-07-31 | End: 2018-08-02

## 2018-07-31 RX ORDER — AMITRIPTYLINE HYDROCHLORIDE 25 MG/1
50 TABLET, FILM COATED ORAL NIGHTLY
Status: DISCONTINUED | OUTPATIENT
Start: 2018-07-31 | End: 2018-08-01

## 2018-07-31 RX ORDER — BUPROPION HYDROCHLORIDE 100 MG/1
100 TABLET ORAL DAILY
Status: DISCONTINUED | OUTPATIENT
Start: 2018-07-31 | End: 2018-08-13 | Stop reason: HOSPADM

## 2018-07-31 RX ORDER — OLANZAPINE 10 MG/1
10 INJECTION, POWDER, LYOPHILIZED, FOR SOLUTION INTRAMUSCULAR 2 TIMES DAILY PRN
Status: DISCONTINUED | OUTPATIENT
Start: 2018-07-31 | End: 2018-08-13 | Stop reason: HOSPADM

## 2018-07-31 RX ORDER — ARIPIPRAZOLE 10 MG/1
10 TABLET ORAL DAILY
Status: DISCONTINUED | OUTPATIENT
Start: 2018-08-01 | End: 2018-08-13 | Stop reason: HOSPADM

## 2018-07-31 RX ADMIN — ACETAMINOPHEN 500 MG: 500 TABLET, FILM COATED ORAL at 12:49

## 2018-07-31 RX ADMIN — ARIPIPRAZOLE 5 MG: 5 TABLET ORAL at 08:28

## 2018-07-31 RX ADMIN — HYDROXYZINE HYDROCHLORIDE 25 MG: 25 TABLET, FILM COATED ORAL at 22:01

## 2018-07-31 RX ADMIN — BUPROPION HYDROCHLORIDE 100 MG: 100 TABLET, FILM COATED ORAL at 09:43

## 2018-07-31 RX ADMIN — MOMETASONE FUROATE AND FORMOTEROL FUMARATE DIHYDRATE 2 PUFF: 200; 5 AEROSOL RESPIRATORY (INHALATION) at 08:30

## 2018-07-31 RX ADMIN — MAGNESIUM HYDROXIDE 10 ML: 400 SUSPENSION ORAL at 18:25

## 2018-07-31 RX ADMIN — PROMETHAZINE HYDROCHLORIDE 12.5 MG: 12.5 TABLET ORAL at 12:49

## 2018-07-31 RX ADMIN — MAGNESIUM HYDROXIDE 10 ML: 400 SUSPENSION ORAL at 12:49

## 2018-07-31 RX ADMIN — MELOXICAM 7.5 MG: 7.5 TABLET ORAL at 08:28

## 2018-07-31 RX ADMIN — DOXYCYCLINE 100 MG: 100 CAPSULE ORAL at 08:28

## 2018-07-31 RX ADMIN — BACLOFEN 10 MG: 10 TABLET ORAL at 08:28

## 2018-07-31 RX ADMIN — CARIPRAZINE 6 MG: 3 CAPSULE, GELATIN COATED ORAL at 08:27

## 2018-07-31 RX ADMIN — GABAPENTIN 300 MG: 300 CAPSULE ORAL at 12:46

## 2018-07-31 RX ADMIN — GABAPENTIN 300 MG: 300 CAPSULE ORAL at 21:04

## 2018-07-31 RX ADMIN — DIPHENHYDRAMINE HCL 25 MG: 25 TABLET ORAL at 21:03

## 2018-07-31 RX ADMIN — PROMETHAZINE HYDROCHLORIDE 12.5 MG: 12.5 TABLET ORAL at 18:24

## 2018-07-31 RX ADMIN — ACETAMINOPHEN 500 MG: 500 TABLET, FILM COATED ORAL at 18:24

## 2018-07-31 RX ADMIN — GABAPENTIN 300 MG: 300 CAPSULE ORAL at 08:27

## 2018-07-31 RX ADMIN — BACLOFEN 10 MG: 10 TABLET ORAL at 21:03

## 2018-07-31 RX ADMIN — DIPHENHYDRAMINE HCL 25 MG: 25 TABLET ORAL at 08:28

## 2018-07-31 RX ADMIN — DULOXETINE HYDROCHLORIDE 90 MG: 60 CAPSULE, DELAYED RELEASE ORAL at 08:28

## 2018-07-31 RX ADMIN — OLANZAPINE 5 MG: 5 TABLET, FILM COATED ORAL at 21:03

## 2018-07-31 RX ADMIN — METHADONE HYDROCHLORIDE 94 MG: 10 SOLUTION ORAL at 09:02

## 2018-07-31 RX ADMIN — AMITRIPTYLINE HYDROCHLORIDE 50 MG: 25 TABLET, FILM COATED ORAL at 21:03

## 2018-07-31 ASSESSMENT — PAIN DESCRIPTION - PAIN TYPE
TYPE: CHRONIC PAIN
TYPE: CHRONIC PAIN

## 2018-07-31 ASSESSMENT — PAIN SCALES - GENERAL
PAINLEVEL_OUTOF10: 7
PAINLEVEL_OUTOF10: 6
PAINLEVEL_OUTOF10: 3
PAINLEVEL_OUTOF10: 7
PAINLEVEL_OUTOF10: 5
PAINLEVEL_OUTOF10: 4
PAINLEVEL_OUTOF10: 7
PAINLEVEL_OUTOF10: 5
PAINLEVEL_OUTOF10: 6

## 2018-07-31 ASSESSMENT — PAIN DESCRIPTION - LOCATION
LOCATION: BACK
LOCATION: BACK

## 2018-07-31 ASSESSMENT — PAIN DESCRIPTION - FREQUENCY: FREQUENCY: INTERMITTENT

## 2018-07-31 NOTE — PLAN OF CARE
Problem: Anger Management/Homicidal Ideation:  Goal: Absence of homicidal ideation  Absence of homicidal ideation   Patient had angry outburst after getting transferred to this unit from another unit. Patient was able to calm self down. Patient denies thoughts to harm others. Problem: Depressive Behavior With or Without Suicide Precautions:  Goal: Able to verbalize acceptance of life and situations over which he or she has no control  Able to verbalize acceptance of life and situations over which he or she has no control   Patient is paranoid and makes multiple paranoid and delusional statements about being poisoned, people going in his room, stating someone poked his foot with a needle. Patient took some of his night medications and refused to take the rest stating he did not need them despite staff encouragement. Goal: Able to verbalize and/or display a decrease in depressive symptoms  Able to verbalize and/or display a decrease in depressive symptoms   Patient denies any suicidal thoughts at this time. Patient agrees to come talk with staff if having any thoughts to harm himself this shift. 15 min rounds continued for patient safety.

## 2018-08-01 PROCEDURE — 6370000000 HC RX 637 (ALT 250 FOR IP): Performed by: PSYCHIATRY & NEUROLOGY

## 2018-08-01 PROCEDURE — 1240000000 HC EMOTIONAL WELLNESS R&B

## 2018-08-01 PROCEDURE — 6360000002 HC RX W HCPCS: Performed by: PSYCHIATRY & NEUROLOGY

## 2018-08-01 RX ORDER — QUETIAPINE FUMARATE 100 MG/1
100 TABLET, FILM COATED ORAL NIGHTLY
Status: DISCONTINUED | OUTPATIENT
Start: 2018-08-01 | End: 2018-08-13 | Stop reason: HOSPADM

## 2018-08-01 RX ADMIN — MAGNESIUM HYDROXIDE 10 ML: 400 SUSPENSION ORAL at 20:37

## 2018-08-01 RX ADMIN — GABAPENTIN 300 MG: 300 CAPSULE ORAL at 08:58

## 2018-08-01 RX ADMIN — BACLOFEN 10 MG: 10 TABLET ORAL at 20:37

## 2018-08-01 RX ADMIN — BUPROPION HYDROCHLORIDE 100 MG: 100 TABLET, FILM COATED ORAL at 09:02

## 2018-08-01 RX ADMIN — DULOXETINE HYDROCHLORIDE 90 MG: 60 CAPSULE, DELAYED RELEASE ORAL at 09:02

## 2018-08-01 RX ADMIN — QUETIAPINE FUMARATE 100 MG: 100 TABLET, FILM COATED ORAL at 23:03

## 2018-08-01 RX ADMIN — DIPHENHYDRAMINE HCL 25 MG: 25 TABLET ORAL at 09:03

## 2018-08-01 RX ADMIN — ACETAMINOPHEN 500 MG: 500 TABLET, FILM COATED ORAL at 08:59

## 2018-08-01 RX ADMIN — MELOXICAM 7.5 MG: 7.5 TABLET ORAL at 09:02

## 2018-08-01 RX ADMIN — DIPHENHYDRAMINE HCL 25 MG: 25 TABLET ORAL at 20:38

## 2018-08-01 RX ADMIN — MAGNESIUM HYDROXIDE 10 ML: 400 SUSPENSION ORAL at 11:01

## 2018-08-01 RX ADMIN — ACETAMINOPHEN 500 MG: 500 TABLET, FILM COATED ORAL at 20:37

## 2018-08-01 RX ADMIN — GABAPENTIN 300 MG: 300 CAPSULE ORAL at 20:37

## 2018-08-01 RX ADMIN — BACLOFEN 10 MG: 10 TABLET ORAL at 09:00

## 2018-08-01 RX ADMIN — PROMETHAZINE HYDROCHLORIDE 12.5 MG: 12.5 TABLET ORAL at 20:37

## 2018-08-01 RX ADMIN — DOXYCYCLINE 100 MG: 100 CAPSULE ORAL at 08:58

## 2018-08-01 RX ADMIN — PROMETHAZINE HYDROCHLORIDE 12.5 MG: 12.5 TABLET ORAL at 10:56

## 2018-08-01 RX ADMIN — ARIPIPRAZOLE 10 MG: 10 TABLET ORAL at 08:58

## 2018-08-01 RX ADMIN — OLANZAPINE 5 MG: 5 TABLET, FILM COATED ORAL at 23:03

## 2018-08-01 RX ADMIN — METHADONE HYDROCHLORIDE 94 MG: 10 SOLUTION ORAL at 08:55

## 2018-08-01 ASSESSMENT — PAIN SCALES - GENERAL
PAINLEVEL_OUTOF10: 5
PAINLEVEL_OUTOF10: 2
PAINLEVEL_OUTOF10: 5
PAINLEVEL_OUTOF10: 7
PAINLEVEL_OUTOF10: 8

## 2018-08-01 NOTE — PLAN OF CARE
Problem: Anger Management/Homicidal Ideation:  Intervention: Group therapy sessions to identify coping skills  PSYCHOTHERAPY GROUP NOTE    [id number]               Start Time:    1000                 End Time: 4817      Number Participants in Group: 7/17      Goals: relationships and recovery         RT x SW  Nsg  LPN   BHTII   LPC       Participation Level:     None  Minimal   x Active Listener x Interactive    Monopolizing         Participation Quality:  x Appropriate  Inappropriate   x  Attentive   Intrusive   x  Sharing   Resistant   x  Supportive    Lethargic       Affective:   x Congruent  Incongruent  Blunted  Flat    Constricted  Anxious  Elated  Angry    Labile  Depressed  Other         Cognitive:  x Alert x Oriented PPTS     Concentration G x F  P    Attention Span G x F  P    Short-Term Memory G x F  P    Long-Term Memory G x F  P    Problem Solving/  Decision Making G x F  P    Ability to Process  Information G x F  P       Contributing Factors             Delusional             Hallucinating             Flight of Ideas             Other: poor concentration       Modes of Intervention:   Education x Support x Exploration   x Clarifying x Problem Solving  Confrontation    Socialization  Limit Setting  Reality Testing    Activity  Movement  Media    Other:          Response to Learning:  x Able to verbalize current knowledge/experience   x Able to verbalize/acknowledge new learning   x Able to retain information   x Capable of insight   x Able to change behavior   x Progressing to goal    Other: intrusive and monopolizing       Comments:  participated in group was active

## 2018-08-01 NOTE — BH NOTE
Patient is having psychotic symptoms such as delusions of persecution and delusions of reference. He believes that people are sending demons to kill him. He is hearing voices making derogatory remarks. Some of the voices are telling him to beat up people. Some voices are telling him to kill himself and die. He complains of agitation and irritability. He feels that he is going to explode and beat up someone. He has poor eye contact. He has poor rapport. He has no insight. His judgement is impaired. Plan: to continue to monitor his progress. Supportive and insight oriented therapy were provided.   Ty Carey 32 y.o. male    Vitals:    08/01/18 0745   BP: 117/72   Pulse: 88   Resp: 14   Temp: 98.2 °F (36.8 °C)   SpO2:        Review of systems  Constitutional:  negative for chills, fevers, sweats  Respiratory:  negative for cough, dyspnea on exertion, hemoptysis, shortness of breath, wheezing  Cardiovascular:  negative for chest pain, chest pressure/discomfort, lower extremity edema, palpitations  Gastrointestinal:  negative for abdominal pain, constipation, diarrhea, nausea, vomiting  Neurological:  negative for dizziness, headache

## 2018-08-01 NOTE — PLAN OF CARE
Problem: Pain:  Goal: Pain level will decrease  Pain level will decrease   Outcome: Ongoing  Patient reported chronic pain. Goal: Control of acute pain  Control of acute pain   Outcome: Ongoing  Patient denied acute pain. Goal: Control of chronic pain  Control of chronic pain   Outcome: Ongoing  Patient acute pain was addressed with pharmacological interventions. Problem: Depressive Behavior With or Without Suicide Precautions:  Goal: Able to verbalize acceptance of life and situations over which he or she has no control  Able to verbalize acceptance of life and situations over which he or she has no control   Outcome: Ongoing  Patient states he is trying to find a place to live and is looking to switch from methadone to suboxone. Goal: Able to verbalize and/or display a decrease in depressive symptoms  Able to verbalize and/or display a decrease in depressive symptoms   Outcome: Ongoing  Patient reports mild anxiety. Problem: Falls - Risk of:  Goal: Will remain free from falls  Will remain free from falls   Outcome: Ongoing  Patient has remained free from falls this shift. Fifteen minute checks maintained for patient safety.

## 2018-08-02 PROCEDURE — 6360000002 HC RX W HCPCS: Performed by: PSYCHIATRY & NEUROLOGY

## 2018-08-02 PROCEDURE — 6370000000 HC RX 637 (ALT 250 FOR IP): Performed by: PSYCHIATRY & NEUROLOGY

## 2018-08-02 PROCEDURE — 1240000000 HC EMOTIONAL WELLNESS R&B

## 2018-08-02 RX ORDER — OLANZAPINE 10 MG/1
10 TABLET ORAL NIGHTLY
Status: DISCONTINUED | OUTPATIENT
Start: 2018-08-02 | End: 2018-08-13 | Stop reason: HOSPADM

## 2018-08-02 RX ADMIN — MAGNESIUM HYDROXIDE 10 ML: 400 SUSPENSION ORAL at 22:17

## 2018-08-02 RX ADMIN — DIPHENHYDRAMINE HCL 25 MG: 25 TABLET ORAL at 08:53

## 2018-08-02 RX ADMIN — ERGOCALCIFEROL 50000 UNITS: 1.25 CAPSULE ORAL at 08:53

## 2018-08-02 RX ADMIN — PROMETHAZINE HYDROCHLORIDE 12.5 MG: 12.5 TABLET ORAL at 09:28

## 2018-08-02 RX ADMIN — QUETIAPINE FUMARATE 100 MG: 100 TABLET, FILM COATED ORAL at 22:17

## 2018-08-02 RX ADMIN — ACETAMINOPHEN 500 MG: 500 TABLET, FILM COATED ORAL at 21:02

## 2018-08-02 RX ADMIN — DIPHENHYDRAMINE HCL 25 MG: 25 TABLET ORAL at 21:02

## 2018-08-02 RX ADMIN — MAGNESIUM HYDROXIDE 10 ML: 400 SUSPENSION ORAL at 09:28

## 2018-08-02 RX ADMIN — BACLOFEN 10 MG: 10 TABLET ORAL at 08:53

## 2018-08-02 RX ADMIN — GABAPENTIN 300 MG: 300 CAPSULE ORAL at 08:53

## 2018-08-02 RX ADMIN — DOXYCYCLINE 100 MG: 100 CAPSULE ORAL at 08:53

## 2018-08-02 RX ADMIN — BUPROPION HYDROCHLORIDE 100 MG: 100 TABLET, FILM COATED ORAL at 08:54

## 2018-08-02 RX ADMIN — GABAPENTIN 300 MG: 300 CAPSULE ORAL at 21:02

## 2018-08-02 RX ADMIN — GABAPENTIN 300 MG: 300 CAPSULE ORAL at 13:29

## 2018-08-02 RX ADMIN — ACETAMINOPHEN 500 MG: 500 TABLET, FILM COATED ORAL at 09:28

## 2018-08-02 RX ADMIN — ARIPIPRAZOLE 10 MG: 10 TABLET ORAL at 08:53

## 2018-08-02 RX ADMIN — METHADONE HYDROCHLORIDE 94 MG: 10 SOLUTION ORAL at 08:57

## 2018-08-02 RX ADMIN — PROMETHAZINE HYDROCHLORIDE 12.5 MG: 12.5 TABLET ORAL at 22:17

## 2018-08-02 RX ADMIN — BACLOFEN 10 MG: 10 TABLET ORAL at 21:02

## 2018-08-02 RX ADMIN — MELOXICAM 7.5 MG: 7.5 TABLET ORAL at 08:53

## 2018-08-02 RX ADMIN — DULOXETINE HYDROCHLORIDE 90 MG: 60 CAPSULE, DELAYED RELEASE ORAL at 08:53

## 2018-08-02 ASSESSMENT — PAIN SCALES - GENERAL
PAINLEVEL_OUTOF10: 10
PAINLEVEL_OUTOF10: 8
PAINLEVEL_OUTOF10: 10
PAINLEVEL_OUTOF10: 10
PAINLEVEL_OUTOF10: 6

## 2018-08-02 ASSESSMENT — PAIN - FUNCTIONAL ASSESSMENT: PAIN_FUNCTIONAL_ASSESSMENT: 0-10

## 2018-08-02 ASSESSMENT — PAIN DESCRIPTION - LOCATION
LOCATION: BACK;LEG
LOCATION: HEAD

## 2018-08-02 ASSESSMENT — PAIN DESCRIPTION - FREQUENCY: FREQUENCY: CONTINUOUS

## 2018-08-02 NOTE — PLAN OF CARE
Problem: Depressive Behavior With or Without Suicide Precautions:  Goal: Able to verbalize acceptance of life and situations over which he or she has no control  Able to verbalize acceptance of life and situations over which he or she has no control   Outcome: Ongoing  Patient remains paranoid but has been out in the milieu more this evening. He remains aloof with most peers but was calm and cooperative during 1:1. He denies all but mild dep/anx. Q15min safety checks continue  Goal: Able to verbalize and/or display a decrease in depressive symptoms  Able to verbalize and/or display a decrease in depressive symptoms   Outcome: Ongoing  Patient reports mild depression/anxiety, denies SI/HI and AVH. He appears paranoid, requesting his door be locked when he is out in the dayroom and blocking his door with items when he is in his room     Problem: Falls - Risk of:  Goal: Will remain free from falls  Will remain free from falls   Outcome: Ongoing  Patient remains free from falls and understands safety risks. Patient is wearing non skid footwear and will alert staff if assistance is needed.  Q15min safety checks continue

## 2018-08-02 NOTE — PLAN OF CARE
Problem: Depressive Behavior With or Without Suicide Precautions:  Goal: Able to verbalize acceptance of life and situations over which he or she has no control  Able to verbalize acceptance of life and situations over which he or she has no control   PATIENT DID NOT ATTEND TRIVIA SKILLS GROUP FROM [id number] DESPITE STAFF ENCOURAGEMENT AND PROMPTS.

## 2018-08-02 NOTE — PLAN OF CARE
Problem: Depressive Behavior With or Without Suicide Precautions:  Goal: Able to verbalize and/or display a decrease in depressive symptoms  Able to verbalize and/or display a decrease in depressive symptoms   Outcome: Ongoing  Pt did not attend community meeting and goal setting group at 0900 despite staff invitation to attend.

## 2018-08-02 NOTE — PLAN OF CARE
Problem: Falls - Risk of:  Goal: Will remain free from falls  Will remain free from falls   Outcome: Ongoing  Sloan Multani continues to wear slip proof socks. His room is clutter free, he is not a fall risk. He continues to be paranoid. He remains medication compliant. He is currently resting in bed.

## 2018-08-03 PROCEDURE — 6370000000 HC RX 637 (ALT 250 FOR IP): Performed by: PSYCHIATRY & NEUROLOGY

## 2018-08-03 PROCEDURE — 6360000002 HC RX W HCPCS: Performed by: PSYCHIATRY & NEUROLOGY

## 2018-08-03 PROCEDURE — 1240000000 HC EMOTIONAL WELLNESS R&B

## 2018-08-03 RX ORDER — AMOXICILLIN AND CLAVULANATE POTASSIUM 875; 125 MG/1; MG/1
1 TABLET, FILM COATED ORAL EVERY 12 HOURS SCHEDULED
Status: COMPLETED | OUTPATIENT
Start: 2018-08-03 | End: 2018-08-09

## 2018-08-03 RX ADMIN — MELOXICAM 7.5 MG: 7.5 TABLET ORAL at 08:27

## 2018-08-03 RX ADMIN — BACLOFEN 10 MG: 10 TABLET ORAL at 21:38

## 2018-08-03 RX ADMIN — AMOXICILLIN AND CLAVULANATE POTASSIUM 1 TABLET: 875; 125 TABLET, FILM COATED ORAL at 12:31

## 2018-08-03 RX ADMIN — AMOXICILLIN AND CLAVULANATE POTASSIUM 1 TABLET: 875; 125 TABLET, FILM COATED ORAL at 21:38

## 2018-08-03 RX ADMIN — GABAPENTIN 300 MG: 300 CAPSULE ORAL at 14:52

## 2018-08-03 RX ADMIN — ACETAMINOPHEN 500 MG: 500 TABLET, FILM COATED ORAL at 15:40

## 2018-08-03 RX ADMIN — PROMETHAZINE HYDROCHLORIDE 12.5 MG: 12.5 TABLET ORAL at 15:40

## 2018-08-03 RX ADMIN — DIPHENHYDRAMINE HCL 25 MG: 25 TABLET ORAL at 21:39

## 2018-08-03 RX ADMIN — BUPROPION HYDROCHLORIDE 100 MG: 100 TABLET, FILM COATED ORAL at 08:26

## 2018-08-03 RX ADMIN — GABAPENTIN 300 MG: 300 CAPSULE ORAL at 21:38

## 2018-08-03 RX ADMIN — ACETAMINOPHEN 500 MG: 500 TABLET, FILM COATED ORAL at 20:05

## 2018-08-03 RX ADMIN — DIPHENHYDRAMINE HCL 25 MG: 25 TABLET ORAL at 08:26

## 2018-08-03 RX ADMIN — MAGNESIUM HYDROXIDE 10 ML: 400 SUSPENSION ORAL at 21:38

## 2018-08-03 RX ADMIN — MAGNESIUM HYDROXIDE 10 ML: 400 SUSPENSION ORAL at 15:42

## 2018-08-03 RX ADMIN — QUETIAPINE FUMARATE 100 MG: 100 TABLET, FILM COATED ORAL at 21:39

## 2018-08-03 RX ADMIN — METHADONE HYDROCHLORIDE 94 MG: 10 SOLUTION ORAL at 08:29

## 2018-08-03 RX ADMIN — BACLOFEN 10 MG: 10 TABLET ORAL at 08:26

## 2018-08-03 RX ADMIN — DOXYCYCLINE 100 MG: 100 CAPSULE ORAL at 08:27

## 2018-08-03 RX ADMIN — ARIPIPRAZOLE 10 MG: 10 TABLET ORAL at 08:27

## 2018-08-03 RX ADMIN — DULOXETINE HYDROCHLORIDE 90 MG: 60 CAPSULE, DELAYED RELEASE ORAL at 08:26

## 2018-08-03 RX ADMIN — GABAPENTIN 300 MG: 300 CAPSULE ORAL at 08:26

## 2018-08-03 RX ADMIN — PROMETHAZINE HYDROCHLORIDE 12.5 MG: 12.5 TABLET ORAL at 21:38

## 2018-08-03 ASSESSMENT — PAIN SCALES - GENERAL
PAINLEVEL_OUTOF10: 3
PAINLEVEL_OUTOF10: 9
PAINLEVEL_OUTOF10: 0
PAINLEVEL_OUTOF10: 7
PAINLEVEL_OUTOF10: 5
PAINLEVEL_OUTOF10: 6

## 2018-08-03 ASSESSMENT — PAIN DESCRIPTION - PAIN TYPE
TYPE: CHRONIC PAIN
TYPE: CHRONIC PAIN

## 2018-08-03 ASSESSMENT — PAIN DESCRIPTION - LOCATION
LOCATION: HEAD
LOCATION: HEAD

## 2018-08-03 NOTE — PLAN OF CARE
Patient Information     Patient Name MRN Faustino Suggs [id number] Male 1946      Nursing Note by Inés Lopez at 8/10/2017  1:15 PM     Author:  Inés Lopez Service:  (none) Author Type:  (none)     Filed:  8/10/2017  1:07 PM Encounter Date:  8/10/2017 Status:  Signed     :  Inés Lopez            Patient here for jock  Itch that is not going away, his procedure was scheduled for today but has been rescheduled to the . He has gone through 2 tubes of Lamasil but not clearing. Inés Lopez LPN .......................8/10/2017  1:05 PM           Problem: Depressive Behavior With or Without Suicide Precautions:  Goal: Able to verbalize and/or display a decrease in depressive symptoms  Able to verbalize and/or display a decrease in depressive symptoms   Outcome: Ongoing  Pt did not participate in community meeting/ goals group at 0900 despite staff encouragement to attend.

## 2018-08-03 NOTE — PLAN OF CARE
Within the past month, have you been thinking about how you might kill yourself? : YES  5) Within the past month, have you started to work out or worked out the details of how to kill yourself? Do you intend to carry out this plan? : YES  6)  Have you ever done anything, started to do anything, or prepared to do anything to end your life?: YES  Change in result:     Change in plan of care:       EDUCATION:   Learner Progress Toward Treatment Goals:    Reviewed results and recommendations of this team, reviewed group plan and strategies, reviewed signs, symptoms and risk of self harm and violent behavior, reviewed goals and plan of care. Method:  individual education, small group, verbal education. Outcome: Pt verbalized understanding, but needs reinforcement to obtain goals. PATIENT GOALS:  Short term:  Absence of SI and decreased depression, medication adjustment  Long term: Follow up care    PLAN/TREATMENT RECOMMENDATIONS UPDATE:   Continue with group therapies, education of coping skills   Continue to monitor patient on unit. Medications provided/medication compliance by patient. Continue for plans to obtain long term goals after discharge.     SHORT-TERM GOALS UPDATE:  Time frame for Short-Term Goals:  8-14days     LONG-TERM GOALS UPDATE:  Time frame for Long-Term Goals:  6 months    Members Present in Team Meeting:   See signature sheet  DIANE Thompson    Goal: Able to verbalize and/or display a decrease in depressive symptoms  Able to verbalize and/or display a decrease in depressive symptoms   Outcome: Ongoing      Problem: Falls - Risk of:  Goal: Will remain free from falls  Will remain free from falls   Outcome: Ongoing

## 2018-08-03 NOTE — PLAN OF CARE
Problem: Pain:  Goal: Control of chronic pain  Control of chronic pain   Outcome: Ongoing  Tesha Dawn suffers from bilateral leg pain and back pain. He does take Baclofen, Mobic, and Neurontin for pain. He is medication compliant and his behavior is well controlled. He remains isolative to his room and is paranoid but states he is ready for discharge. His blood pressure was 141/92 this morning and I will discuss this with his physician. He is currently resting in bed with no signs or symptoms of distress.

## 2018-08-04 PROCEDURE — 6370000000 HC RX 637 (ALT 250 FOR IP): Performed by: PSYCHIATRY & NEUROLOGY

## 2018-08-04 PROCEDURE — 1240000000 HC EMOTIONAL WELLNESS R&B

## 2018-08-04 PROCEDURE — 6360000002 HC RX W HCPCS: Performed by: PSYCHIATRY & NEUROLOGY

## 2018-08-04 RX ADMIN — GABAPENTIN 300 MG: 300 CAPSULE ORAL at 09:06

## 2018-08-04 RX ADMIN — ACETAMINOPHEN 500 MG: 500 TABLET, FILM COATED ORAL at 21:24

## 2018-08-04 RX ADMIN — ARIPIPRAZOLE 10 MG: 10 TABLET ORAL at 09:05

## 2018-08-04 RX ADMIN — MELOXICAM 7.5 MG: 7.5 TABLET ORAL at 09:07

## 2018-08-04 RX ADMIN — BACLOFEN 10 MG: 10 TABLET ORAL at 09:07

## 2018-08-04 RX ADMIN — GABAPENTIN 300 MG: 300 CAPSULE ORAL at 23:17

## 2018-08-04 RX ADMIN — MAGNESIUM HYDROXIDE 10 ML: 400 SUSPENSION ORAL at 21:24

## 2018-08-04 RX ADMIN — AMOXICILLIN AND CLAVULANATE POTASSIUM 1 TABLET: 875; 125 TABLET, FILM COATED ORAL at 21:25

## 2018-08-04 RX ADMIN — ACETAMINOPHEN 500 MG: 500 TABLET, FILM COATED ORAL at 10:29

## 2018-08-04 RX ADMIN — QUETIAPINE FUMARATE 100 MG: 100 TABLET, FILM COATED ORAL at 23:17

## 2018-08-04 RX ADMIN — DULOXETINE HYDROCHLORIDE 90 MG: 60 CAPSULE, DELAYED RELEASE ORAL at 09:06

## 2018-08-04 RX ADMIN — METHADONE HYDROCHLORIDE 94 MG: 10 SOLUTION ORAL at 09:07

## 2018-08-04 RX ADMIN — PROMETHAZINE HYDROCHLORIDE 12.5 MG: 12.5 TABLET ORAL at 21:25

## 2018-08-04 RX ADMIN — DOXYCYCLINE 100 MG: 100 CAPSULE ORAL at 09:06

## 2018-08-04 RX ADMIN — PROMETHAZINE HYDROCHLORIDE 12.5 MG: 12.5 TABLET ORAL at 10:29

## 2018-08-04 RX ADMIN — DIPHENHYDRAMINE HCL 25 MG: 25 TABLET ORAL at 09:06

## 2018-08-04 RX ADMIN — BUPROPION HYDROCHLORIDE 100 MG: 100 TABLET, FILM COATED ORAL at 09:06

## 2018-08-04 RX ADMIN — DIPHENHYDRAMINE HCL 25 MG: 25 TABLET ORAL at 21:25

## 2018-08-04 RX ADMIN — AMOXICILLIN AND CLAVULANATE POTASSIUM 1 TABLET: 875; 125 TABLET, FILM COATED ORAL at 09:06

## 2018-08-04 RX ADMIN — GABAPENTIN 300 MG: 300 CAPSULE ORAL at 15:57

## 2018-08-04 RX ADMIN — MAGNESIUM HYDROXIDE 10 ML: 400 SUSPENSION ORAL at 10:29

## 2018-08-04 RX ADMIN — BACLOFEN 10 MG: 10 TABLET ORAL at 23:17

## 2018-08-04 ASSESSMENT — PAIN SCALES - GENERAL
PAINLEVEL_OUTOF10: 7
PAINLEVEL_OUTOF10: 3
PAINLEVEL_OUTOF10: 4
PAINLEVEL_OUTOF10: 8

## 2018-08-04 ASSESSMENT — PAIN - FUNCTIONAL ASSESSMENT
PAIN_FUNCTIONAL_ASSESSMENT: 0-10
PAIN_FUNCTIONAL_ASSESSMENT: 0-10

## 2018-08-04 ASSESSMENT — PAIN DESCRIPTION - DESCRIPTORS: DESCRIPTORS: HEADACHE

## 2018-08-04 NOTE — BH NOTE
Psychoeducation Group Note    Date: 8/4/18 Start Time: 1100 End Time: 1130    Number Participants in Group:  9    Goal:  Patient will demonstrate increased interpersonal interaction   Topic: Coping Skills    Discipline Responsible:   OT  AT     X Nsg.  RT  Other       Participation Level:     None  Minimal   X Active Listener X Interactive    Monopolizing         Participation Quality:  X Appropriate  Inappropriate   X       Attentive        Intrusive   X       Sharing        Resistant   X       Supportive        Lethargic       Affective:   X Congruent  Incongruent  Blunted  Flat    Constricted  Anxious  Elated  Angry    Labile  Depressed  Other         Cognitive:  X Alert  Oriented PPTP     Concentration X G  F  P   Attention Span X G  F  P   Short-Term Memory X G  F  P   Long-Term Memory X G  F  P   ProblemSolving/  Decision Making X G  F  P   Ability to Process  Information X G  F  P      Contributing Factors             Delusional             Hallucinating             Flight of Ideas             Other:       Modes of Intervention:  X Education  Support  Exploration    Clarifying  Problem Solving  Confrontation    Socialization  Limit Setting  Reality Testing    Activity  Movement  Media    Other:            Response to Learning:  X Able to verbalize current knowledge/experience    Able to verbalize/acknowledge new learning    Able to retain information    Capable of insight    Able to change behavior    Progressing to goal    Other:        Comments:

## 2018-08-04 NOTE — PLAN OF CARE
Problem: Depressive Behavior With or Without Suicide Precautions:  Goal: Able to verbalize acceptance of life and situations over which he or she has no control  Able to verbalize acceptance of life and situations over which he or she has no control   Psychoeducation Group Note    Date: 8/4/18  Start Time: \0900  End Time: 0930    Number Participants in Group:  9/20    Goal:  Patient will demonstrate increased interpersonal interaction   Topic: community meeting and goal setting    Discipline Responsible:   OT  AT  [facility]. x RT  Other       Participation Level:     None  Minimal   x Active Listener x Interactive    Monopolizing         Participation Quality:  x Appropriate  Inappropriate   x       Attentive        Intrusive          Sharing        Resistant          Supportive        Lethargic       Affective:   x Congruent  Incongruent  Blunted  Flat    Constricted  Anxious  Elated  Angry    Labile  Depressed  Other         Cognitive:  x Alert x Oriented PPTP     Concentration x G  F  P   Attention Span x G  F  P   Short-Term Memory  G  F  P   Long-Term Memory  G  F  P   ProblemSolving/  Decision Making x G  F  P   Ability to Process  Information x G  F  P      Contributing Factors             Delusional             Hallucinating             Flight of Ideas             Other:       Modes of Intervention:  x Education x Support  Exploration    Clarifying x Problem Solving  Confrontation   x Socialization  Limit Setting  Reality Testing   x Activity  Movement  Media    Other:            Response to Learning:  x Able to verbalize current knowledge/experience   x Able to verbalize/acknowledge new learning    Able to retain information   x Capable of insight   x Able to change behavior    Progressing to goal    Other:        Comments:

## 2018-08-05 PROCEDURE — 6360000002 HC RX W HCPCS: Performed by: PSYCHIATRY & NEUROLOGY

## 2018-08-05 PROCEDURE — 1240000000 HC EMOTIONAL WELLNESS R&B

## 2018-08-05 PROCEDURE — 6370000000 HC RX 637 (ALT 250 FOR IP): Performed by: PSYCHIATRY & NEUROLOGY

## 2018-08-05 RX ADMIN — MAGNESIUM HYDROXIDE 10 ML: 400 SUSPENSION ORAL at 22:05

## 2018-08-05 RX ADMIN — BACLOFEN 10 MG: 10 TABLET ORAL at 09:18

## 2018-08-05 RX ADMIN — GABAPENTIN 300 MG: 300 CAPSULE ORAL at 13:02

## 2018-08-05 RX ADMIN — AMOXICILLIN AND CLAVULANATE POTASSIUM 1 TABLET: 875; 125 TABLET, FILM COATED ORAL at 09:17

## 2018-08-05 RX ADMIN — NICOTINE POLACRILEX 2 MG: 2 GUM, CHEWING BUCCAL at 21:10

## 2018-08-05 RX ADMIN — QUETIAPINE FUMARATE 100 MG: 100 TABLET, FILM COATED ORAL at 22:06

## 2018-08-05 RX ADMIN — BUPROPION HYDROCHLORIDE 100 MG: 100 TABLET, FILM COATED ORAL at 09:18

## 2018-08-05 RX ADMIN — DIPHENHYDRAMINE HCL 25 MG: 25 TABLET ORAL at 20:42

## 2018-08-05 RX ADMIN — ACETAMINOPHEN 500 MG: 500 TABLET, FILM COATED ORAL at 20:42

## 2018-08-05 RX ADMIN — METHADONE HYDROCHLORIDE 94 MG: 10 SOLUTION ORAL at 09:18

## 2018-08-05 RX ADMIN — DULOXETINE HYDROCHLORIDE 90 MG: 60 CAPSULE, DELAYED RELEASE ORAL at 09:18

## 2018-08-05 RX ADMIN — PROMETHAZINE HYDROCHLORIDE 12.5 MG: 12.5 TABLET ORAL at 22:06

## 2018-08-05 RX ADMIN — DOXYCYCLINE 100 MG: 100 CAPSULE ORAL at 09:17

## 2018-08-05 RX ADMIN — PROMETHAZINE HYDROCHLORIDE 12.5 MG: 12.5 TABLET ORAL at 16:39

## 2018-08-05 RX ADMIN — GABAPENTIN 300 MG: 300 CAPSULE ORAL at 09:17

## 2018-08-05 RX ADMIN — MAGNESIUM HYDROXIDE 10 ML: 400 SUSPENSION ORAL at 16:40

## 2018-08-05 RX ADMIN — MELOXICAM 7.5 MG: 7.5 TABLET ORAL at 09:18

## 2018-08-05 RX ADMIN — AMOXICILLIN AND CLAVULANATE POTASSIUM 1 TABLET: 875; 125 TABLET, FILM COATED ORAL at 20:42

## 2018-08-05 RX ADMIN — ARIPIPRAZOLE 10 MG: 10 TABLET ORAL at 09:17

## 2018-08-05 RX ADMIN — GABAPENTIN 300 MG: 300 CAPSULE ORAL at 22:06

## 2018-08-05 RX ADMIN — BACLOFEN 10 MG: 10 TABLET ORAL at 20:42

## 2018-08-05 RX ADMIN — ACETAMINOPHEN 500 MG: 500 TABLET, FILM COATED ORAL at 16:39

## 2018-08-05 RX ADMIN — DIPHENHYDRAMINE HCL 25 MG: 25 TABLET ORAL at 09:17

## 2018-08-05 RX ADMIN — HYDROXYZINE HYDROCHLORIDE 25 MG: 25 TABLET, FILM COATED ORAL at 22:14

## 2018-08-05 RX ADMIN — PROMETHAZINE HYDROCHLORIDE 12.5 MG: 12.5 TABLET ORAL at 09:18

## 2018-08-05 ASSESSMENT — PAIN SCALES - GENERAL
PAINLEVEL_OUTOF10: 3
PAINLEVEL_OUTOF10: 6
PAINLEVEL_OUTOF10: 2
PAINLEVEL_OUTOF10: 3
PAINLEVEL_OUTOF10: 0
PAINLEVEL_OUTOF10: 5

## 2018-08-05 ASSESSMENT — PAIN DESCRIPTION - LOCATION
LOCATION: HEAD
LOCATION: HEAD

## 2018-08-05 ASSESSMENT — PAIN DESCRIPTION - PAIN TYPE
TYPE: ACUTE PAIN
TYPE: ACUTE PAIN

## 2018-08-05 NOTE — PLAN OF CARE
Problem: Depressive Behavior With or Without Suicide Precautions:  Goal: Able to verbalize and/or display a decrease in depressive symptoms  Able to verbalize and/or display a decrease in depressive symptoms   Outcome: Ongoing  Patient is calm, controlled upon approach, cooperative with treatment. Patient keeps to self and does not interact with peers. Pt is med compliant, completes ADLs without prompting, and reports good sleep patterns as well as a good appetite. Denies SI, HI, AVH at this time. Q15min checks maintained for safety. Problem: Falls - Risk of:  Goal: Will remain free from falls  Will remain free from falls   Outcome: Ongoing  Pt remains free of falls and verbalizes understanding of individual fall risks. Pt wearing non skid footwear and encouraged to seek out staff for any assistance needed.

## 2018-08-05 NOTE — PLAN OF CARE
Problem: Pain:  Goal: Control of chronic pain  Control of chronic pain   Outcome: Ongoing  Court Kayode currently complains of sore throat, headache, and back pain. He's currently on antibiotics already and he did have his pain medications. He's medication compliant. He states he's still depressed but he denies suicidal and homicidal ideations. He denies hearing any voices.

## 2018-08-06 PROCEDURE — 6360000002 HC RX W HCPCS: Performed by: PSYCHIATRY & NEUROLOGY

## 2018-08-06 PROCEDURE — 1240000000 HC EMOTIONAL WELLNESS R&B

## 2018-08-06 PROCEDURE — 6370000000 HC RX 637 (ALT 250 FOR IP): Performed by: PSYCHIATRY & NEUROLOGY

## 2018-08-06 RX ADMIN — ARIPIPRAZOLE 10 MG: 10 TABLET ORAL at 09:40

## 2018-08-06 RX ADMIN — GABAPENTIN 300 MG: 300 CAPSULE ORAL at 23:02

## 2018-08-06 RX ADMIN — ACETAMINOPHEN 500 MG: 500 TABLET, FILM COATED ORAL at 20:30

## 2018-08-06 RX ADMIN — GABAPENTIN 300 MG: 300 CAPSULE ORAL at 09:40

## 2018-08-06 RX ADMIN — AMOXICILLIN AND CLAVULANATE POTASSIUM 1 TABLET: 875; 125 TABLET, FILM COATED ORAL at 20:30

## 2018-08-06 RX ADMIN — DIPHENHYDRAMINE HCL 25 MG: 25 TABLET ORAL at 09:40

## 2018-08-06 RX ADMIN — DOXYCYCLINE 100 MG: 100 CAPSULE ORAL at 09:40

## 2018-08-06 RX ADMIN — DIPHENHYDRAMINE HCL 25 MG: 25 TABLET ORAL at 22:59

## 2018-08-06 RX ADMIN — QUETIAPINE FUMARATE 100 MG: 100 TABLET, FILM COATED ORAL at 22:59

## 2018-08-06 RX ADMIN — BACLOFEN 10 MG: 10 TABLET ORAL at 20:30

## 2018-08-06 RX ADMIN — METHADONE HYDROCHLORIDE 94 MG: 10 SOLUTION ORAL at 09:35

## 2018-08-06 RX ADMIN — GABAPENTIN 300 MG: 300 CAPSULE ORAL at 13:59

## 2018-08-06 RX ADMIN — BACLOFEN 10 MG: 10 TABLET ORAL at 09:40

## 2018-08-06 RX ADMIN — NICOTINE POLACRILEX 2 MG: 2 GUM, CHEWING BUCCAL at 23:01

## 2018-08-06 RX ADMIN — AMOXICILLIN AND CLAVULANATE POTASSIUM 1 TABLET: 875; 125 TABLET, FILM COATED ORAL at 09:40

## 2018-08-06 RX ADMIN — NICOTINE POLACRILEX 2 MG: 2 GUM, CHEWING BUCCAL at 13:59

## 2018-08-06 RX ADMIN — NICOTINE POLACRILEX 2 MG: 2 GUM, CHEWING BUCCAL at 16:04

## 2018-08-06 RX ADMIN — PROMETHAZINE HYDROCHLORIDE 12.5 MG: 12.5 TABLET ORAL at 20:30

## 2018-08-06 RX ADMIN — MOMETASONE FUROATE AND FORMOTEROL FUMARATE DIHYDRATE 2 PUFF: 200; 5 AEROSOL RESPIRATORY (INHALATION) at 22:59

## 2018-08-06 RX ADMIN — MELOXICAM 7.5 MG: 7.5 TABLET ORAL at 09:40

## 2018-08-06 RX ADMIN — HYDROXYZINE HYDROCHLORIDE 25 MG: 25 TABLET, FILM COATED ORAL at 22:59

## 2018-08-06 RX ADMIN — BUPROPION HYDROCHLORIDE 100 MG: 100 TABLET, FILM COATED ORAL at 09:40

## 2018-08-06 RX ADMIN — ACETAMINOPHEN 500 MG: 500 TABLET, FILM COATED ORAL at 13:59

## 2018-08-06 RX ADMIN — PROMETHAZINE HYDROCHLORIDE 12.5 MG: 12.5 TABLET ORAL at 13:59

## 2018-08-06 RX ADMIN — MAGNESIUM HYDROXIDE 10 ML: 400 SUSPENSION ORAL at 13:59

## 2018-08-06 RX ADMIN — NICOTINE POLACRILEX 2 MG: 2 GUM, CHEWING BUCCAL at 20:30

## 2018-08-06 RX ADMIN — DULOXETINE HYDROCHLORIDE 90 MG: 60 CAPSULE, DELAYED RELEASE ORAL at 09:40

## 2018-08-06 ASSESSMENT — PAIN SCALES - GENERAL
PAINLEVEL_OUTOF10: 4
PAINLEVEL_OUTOF10: 4
PAINLEVEL_OUTOF10: 7
PAINLEVEL_OUTOF10: 7
PAINLEVEL_OUTOF10: 6
PAINLEVEL_OUTOF10: 0
PAINLEVEL_OUTOF10: 5

## 2018-08-06 ASSESSMENT — PAIN DESCRIPTION - PAIN TYPE: TYPE: ACUTE PAIN

## 2018-08-06 ASSESSMENT — PAIN DESCRIPTION - LOCATION: LOCATION: GENERALIZED

## 2018-08-06 NOTE — BH NOTE
Patient has thought blocking and thought withdrawal.    Patient has restricted mood and affect. He has poor eye contact. He has lose associations. He has derailment. He has delusions of persecution and delusions of referenece. He is responding actively to auditory hallucinations and talking back to the voices. He has poor impulse control. He has suicidal thoughts and plans to overdose on medications and die. He has no insight. His judgement is impaired. Plan: to continue current management.   Sha Lara 32 y.o. male    Vitals:    08/06/18 0815   BP: 114/76   Pulse: 112   Resp: 14   Temp: 97.9 °F (36.6 °C)   SpO2:        Review of systems  Constitutional:  negative for chills, fevers, sweats  Respiratory:  negative for cough, dyspnea on exertion, hemoptysis, shortness of breath, wheezing  Cardiovascular:  negative for chest pain, chest pressure/discomfort, lower extremity edema, palpitations  Gastrointestinal:  negative for abdominal pain, constipation, diarrhea, nausea, vomiting  Neurological:  negative for dizziness, headache

## 2018-08-07 PROCEDURE — 6360000002 HC RX W HCPCS: Performed by: PSYCHIATRY & NEUROLOGY

## 2018-08-07 PROCEDURE — 1240000000 HC EMOTIONAL WELLNESS R&B

## 2018-08-07 PROCEDURE — 6370000000 HC RX 637 (ALT 250 FOR IP): Performed by: PSYCHIATRY & NEUROLOGY

## 2018-08-07 RX ORDER — NICOTINE 21 MG/24HR
1 PATCH, TRANSDERMAL 24 HOURS TRANSDERMAL DAILY
Status: DISCONTINUED | OUTPATIENT
Start: 2018-08-07 | End: 2018-08-13 | Stop reason: HOSPADM

## 2018-08-07 RX ADMIN — PROMETHAZINE HYDROCHLORIDE 12.5 MG: 12.5 TABLET ORAL at 15:02

## 2018-08-07 RX ADMIN — MAGNESIUM HYDROXIDE 10 ML: 400 SUSPENSION ORAL at 21:01

## 2018-08-07 RX ADMIN — BACLOFEN 10 MG: 10 TABLET ORAL at 22:13

## 2018-08-07 RX ADMIN — OLANZAPINE 10 MG: 10 TABLET, FILM COATED ORAL at 21:02

## 2018-08-07 RX ADMIN — NICOTINE POLACRILEX 2 MG: 2 GUM, CHEWING BUCCAL at 20:19

## 2018-08-07 RX ADMIN — HYDROXYZINE HYDROCHLORIDE 25 MG: 25 TABLET, FILM COATED ORAL at 22:14

## 2018-08-07 RX ADMIN — ACETAMINOPHEN 500 MG: 500 TABLET, FILM COATED ORAL at 15:01

## 2018-08-07 RX ADMIN — ACETAMINOPHEN 500 MG: 500 TABLET, FILM COATED ORAL at 01:21

## 2018-08-07 RX ADMIN — QUETIAPINE FUMARATE 100 MG: 100 TABLET, FILM COATED ORAL at 22:13

## 2018-08-07 RX ADMIN — BUPROPION HYDROCHLORIDE 100 MG: 100 TABLET, FILM COATED ORAL at 09:59

## 2018-08-07 RX ADMIN — AMOXICILLIN AND CLAVULANATE POTASSIUM 1 TABLET: 875; 125 TABLET, FILM COATED ORAL at 21:01

## 2018-08-07 RX ADMIN — ARIPIPRAZOLE 10 MG: 10 TABLET ORAL at 09:59

## 2018-08-07 RX ADMIN — GABAPENTIN 300 MG: 300 CAPSULE ORAL at 14:59

## 2018-08-07 RX ADMIN — ALUMINUM HYDROXIDE, MAGNESIUM HYDROXIDE, AND SIMETHICONE 30 ML: 200; 200; 20 SUSPENSION ORAL at 01:21

## 2018-08-07 RX ADMIN — METHADONE HYDROCHLORIDE 94 MG: 10 SOLUTION ORAL at 09:55

## 2018-08-07 RX ADMIN — NICOTINE POLACRILEX 2 MG: 2 GUM, CHEWING BUCCAL at 07:51

## 2018-08-07 RX ADMIN — ACETAMINOPHEN 500 MG: 500 TABLET, FILM COATED ORAL at 21:02

## 2018-08-07 RX ADMIN — DIPHENHYDRAMINE HCL 25 MG: 25 TABLET ORAL at 21:02

## 2018-08-07 RX ADMIN — DULOXETINE HYDROCHLORIDE 90 MG: 60 CAPSULE, DELAYED RELEASE ORAL at 10:00

## 2018-08-07 RX ADMIN — PROMETHAZINE HYDROCHLORIDE 12.5 MG: 12.5 TABLET ORAL at 03:45

## 2018-08-07 RX ADMIN — MELOXICAM 7.5 MG: 7.5 TABLET ORAL at 09:59

## 2018-08-07 RX ADMIN — ACETAMINOPHEN 500 MG: 500 TABLET, FILM COATED ORAL at 07:51

## 2018-08-07 RX ADMIN — DOXYCYCLINE 100 MG: 100 CAPSULE ORAL at 09:58

## 2018-08-07 RX ADMIN — NICOTINE POLACRILEX 2 MG: 2 GUM, CHEWING BUCCAL at 15:59

## 2018-08-07 RX ADMIN — DIPHENHYDRAMINE HCL 25 MG: 25 TABLET ORAL at 09:58

## 2018-08-07 RX ADMIN — BACLOFEN 10 MG: 10 TABLET ORAL at 09:58

## 2018-08-07 RX ADMIN — GABAPENTIN 300 MG: 300 CAPSULE ORAL at 09:58

## 2018-08-07 RX ADMIN — GABAPENTIN 300 MG: 300 CAPSULE ORAL at 21:01

## 2018-08-07 RX ADMIN — AMOXICILLIN AND CLAVULANATE POTASSIUM 1 TABLET: 875; 125 TABLET, FILM COATED ORAL at 09:58

## 2018-08-07 ASSESSMENT — PAIN DESCRIPTION - PAIN TYPE
TYPE: ACUTE PAIN

## 2018-08-07 ASSESSMENT — PAIN SCALES - GENERAL
PAINLEVEL_OUTOF10: 7
PAINLEVEL_OUTOF10: 6
PAINLEVEL_OUTOF10: 4
PAINLEVEL_OUTOF10: 6
PAINLEVEL_OUTOF10: 2
PAINLEVEL_OUTOF10: 5
PAINLEVEL_OUTOF10: 10
PAINLEVEL_OUTOF10: 6
PAINLEVEL_OUTOF10: 4
PAINLEVEL_OUTOF10: 3
PAINLEVEL_OUTOF10: 4
PAINLEVEL_OUTOF10: 6

## 2018-08-07 ASSESSMENT — PAIN DESCRIPTION - LOCATION
LOCATION: GENERALIZED

## 2018-08-07 NOTE — PLAN OF CARE
Problem: Depressive Behavior With or Without Suicide Precautions:  Goal: Able to verbalize and/or display a decrease in depressive symptoms  Able to verbalize and/or display a decrease in depressive symptoms   Patient did not attend skills group which focused on  Aromatherapy benefits from  [id number] despite staff encouragement and prompts.

## 2018-08-08 PROCEDURE — 1240000000 HC EMOTIONAL WELLNESS R&B

## 2018-08-08 PROCEDURE — 6370000000 HC RX 637 (ALT 250 FOR IP): Performed by: PSYCHIATRY & NEUROLOGY

## 2018-08-08 PROCEDURE — 6360000002 HC RX W HCPCS: Performed by: PSYCHIATRY & NEUROLOGY

## 2018-08-08 RX ADMIN — MELOXICAM 7.5 MG: 7.5 TABLET ORAL at 08:41

## 2018-08-08 RX ADMIN — GABAPENTIN 300 MG: 300 CAPSULE ORAL at 15:11

## 2018-08-08 RX ADMIN — MOMETASONE FUROATE AND FORMOTEROL FUMARATE DIHYDRATE 2 PUFF: 200; 5 AEROSOL RESPIRATORY (INHALATION) at 08:40

## 2018-08-08 RX ADMIN — AMOXICILLIN AND CLAVULANATE POTASSIUM 1 TABLET: 875; 125 TABLET, FILM COATED ORAL at 08:41

## 2018-08-08 RX ADMIN — BUPROPION HYDROCHLORIDE 100 MG: 100 TABLET, FILM COATED ORAL at 08:41

## 2018-08-08 RX ADMIN — DIPHENHYDRAMINE HCL 25 MG: 25 TABLET ORAL at 08:41

## 2018-08-08 RX ADMIN — PANTOPRAZOLE SODIUM 40 MG: 40 TABLET, DELAYED RELEASE ORAL at 08:41

## 2018-08-08 RX ADMIN — PROMETHAZINE HYDROCHLORIDE 12.5 MG: 12.5 TABLET ORAL at 19:58

## 2018-08-08 RX ADMIN — MAGNESIUM HYDROXIDE 10 ML: 400 SUSPENSION ORAL at 15:11

## 2018-08-08 RX ADMIN — PROMETHAZINE HYDROCHLORIDE 12.5 MG: 12.5 TABLET ORAL at 15:11

## 2018-08-08 RX ADMIN — MAGNESIUM HYDROXIDE 10 ML: 400 SUSPENSION ORAL at 19:58

## 2018-08-08 RX ADMIN — ARIPIPRAZOLE 10 MG: 10 TABLET ORAL at 08:41

## 2018-08-08 RX ADMIN — BACLOFEN 10 MG: 10 TABLET ORAL at 08:41

## 2018-08-08 RX ADMIN — AMOXICILLIN AND CLAVULANATE POTASSIUM 1 TABLET: 875; 125 TABLET, FILM COATED ORAL at 21:08

## 2018-08-08 RX ADMIN — QUETIAPINE FUMARATE 100 MG: 100 TABLET, FILM COATED ORAL at 22:11

## 2018-08-08 RX ADMIN — METHADONE HYDROCHLORIDE 94 MG: 10 SOLUTION ORAL at 09:45

## 2018-08-08 RX ADMIN — DOXYCYCLINE 100 MG: 100 CAPSULE ORAL at 08:40

## 2018-08-08 RX ADMIN — BACLOFEN 10 MG: 10 TABLET ORAL at 21:08

## 2018-08-08 RX ADMIN — ACETAMINOPHEN 500 MG: 500 TABLET, FILM COATED ORAL at 19:58

## 2018-08-08 RX ADMIN — HYDROXYZINE HYDROCHLORIDE 25 MG: 25 TABLET, FILM COATED ORAL at 19:58

## 2018-08-08 RX ADMIN — HYDROXYZINE HYDROCHLORIDE 25 MG: 25 TABLET, FILM COATED ORAL at 08:41

## 2018-08-08 RX ADMIN — GABAPENTIN 300 MG: 300 CAPSULE ORAL at 22:10

## 2018-08-08 RX ADMIN — GABAPENTIN 300 MG: 300 CAPSULE ORAL at 08:40

## 2018-08-08 RX ADMIN — SUCRALFATE 1 G: 1 TABLET ORAL at 08:41

## 2018-08-08 RX ADMIN — DULOXETINE HYDROCHLORIDE 90 MG: 60 CAPSULE, DELAYED RELEASE ORAL at 08:42

## 2018-08-08 RX ADMIN — DIPHENHYDRAMINE HCL 25 MG: 25 TABLET ORAL at 21:08

## 2018-08-08 ASSESSMENT — PAIN SCALES - GENERAL
PAINLEVEL_OUTOF10: 2
PAINLEVEL_OUTOF10: 6
PAINLEVEL_OUTOF10: 3
PAINLEVEL_OUTOF10: 6

## 2018-08-08 ASSESSMENT — PAIN - FUNCTIONAL ASSESSMENT: PAIN_FUNCTIONAL_ASSESSMENT: 0-10

## 2018-08-08 NOTE — PLAN OF CARE
Problem: Depressive Behavior With or Without Suicide Precautions:  Goal: Able to verbalize acceptance of life and situations over which he or she has no control  Able to verbalize acceptance of life and situations over which he or she has no control   Outcome: Ongoing  Patient is cooperative with assessments and does say he is feeling better. He denies any thought to harm himself or others, but he remains paranoid and suspicious and continues with the delusion that someone may want to poison him here. He is receptive to one on one, but rejects any doubt into this delusion. He denies auditory angel luis visual hallucinations.

## 2018-08-08 NOTE — PLAN OF CARE
Problem: Depressive Behavior With or Without Suicide Precautions:  Goal: Able to verbalize and/or display a decrease in depressive symptoms  Able to verbalize and/or display a decrease in depressive symptoms   Outcome: Ongoing  PSYCHOEDUCATION GROUP NOTE    Date: 8/8/18  Start Time: 1335  End Time: 1420    Number Participants in Group:  9/15    Goal:  Patient will demonstrate increased interpersonal interaction   Topic: Vision boards and goal setting    Discipline Responsible:   OT  AT  [facility]. x RT MHP Other       Participation Level:     None  Minimal    Active Listener x Interactive    Monopolizing         Participation Quality:  x Appropriate  Inappropriate          Attentive x       Intrusive          Sharing x       Resistant          Supportive        Lethargic       Affective:    Congruent  Incongruent  Blunted  Flat   x Constricted  Anxious  Elated  Angry    Labile  Depressed  Other         Cognitive:  x Alert x Oriented PPTP     Concentration  G x F  P   Attention Span  G x F  P   Short-Term Memory  G x F  P   Long-Term Memory  G x F  P   ProblemSolving/  Decision Making  G x F  P   Ability to Process  Information  G x F  P      Contributing Factors             Delusional             Hallucinating             Flight of Ideas             Other:       Modes of Intervention:  x Education x Support x Exploration    Clarifying  Problem Solving  Confrontation   x Socialization  Limit Setting x Reality Testing   x Activity  Movement  Media    Other:            Response to Learning:  x Able to verbalize current knowledge/experience   x Able to verbalize/acknowledge new learning   x Able to retain information    Capable of insight    Able to change behavior    Progressing to goal    Other:        Comments: Pt attended group and participated.

## 2018-08-08 NOTE — CARE COORDINATION
Sw met with patient this date for 1:1 on patient to offer support. Patient identifies multiple goals he wishes to complete while at hospital, including obtaining specialized automotive insurance, purchasing car, obtaining housing, applying for social security. Patient has been observed by staff on patient phone for lengthy periods. Patient observed this date by SW to have journal with many pages of notes written in multiple directions on multiple pages. SW and patient discussed the impact on large goal setting on patient's anxiety; patient identifies that he feels \"overwhelmed at all this stuff I need to do\" and gestures to his journal. SW and patient discussed mindfulness therapy as it applies to being present in the moment, working towards his care plan, managing symptoms to reduce hospitalizations and promote long term management of symptoms outside of hospital. SW and patient also discussed prioritizing his community needs and setting smaller goals to prevent return of anxious feelings \"feeling overwhelmed\" Patient observed to have improved insight this date and is more receptive to interactive discussion on goals. SW offered continued support and encouragement.

## 2018-08-09 PROCEDURE — 1240000000 HC EMOTIONAL WELLNESS R&B

## 2018-08-09 PROCEDURE — 6370000000 HC RX 637 (ALT 250 FOR IP): Performed by: PSYCHIATRY & NEUROLOGY

## 2018-08-09 PROCEDURE — 6360000002 HC RX W HCPCS: Performed by: PSYCHIATRY & NEUROLOGY

## 2018-08-09 RX ADMIN — BUPROPION HYDROCHLORIDE 100 MG: 100 TABLET, FILM COATED ORAL at 09:09

## 2018-08-09 RX ADMIN — DIPHENHYDRAMINE HCL 25 MG: 25 TABLET ORAL at 21:11

## 2018-08-09 RX ADMIN — DOXYCYCLINE 100 MG: 100 CAPSULE ORAL at 09:09

## 2018-08-09 RX ADMIN — BACLOFEN 10 MG: 10 TABLET ORAL at 09:09

## 2018-08-09 RX ADMIN — ARIPIPRAZOLE 10 MG: 10 TABLET ORAL at 09:09

## 2018-08-09 RX ADMIN — METHADONE HYDROCHLORIDE 94 MG: 10 SOLUTION ORAL at 08:30

## 2018-08-09 RX ADMIN — PROMETHAZINE HYDROCHLORIDE 12.5 MG: 12.5 TABLET ORAL at 18:43

## 2018-08-09 RX ADMIN — ACETAMINOPHEN 500 MG: 500 TABLET, FILM COATED ORAL at 18:44

## 2018-08-09 RX ADMIN — AMOXICILLIN AND CLAVULANATE POTASSIUM 1 TABLET: 875; 125 TABLET, FILM COATED ORAL at 21:12

## 2018-08-09 RX ADMIN — ERGOCALCIFEROL 50000 UNITS: 1.25 CAPSULE ORAL at 09:11

## 2018-08-09 RX ADMIN — GABAPENTIN 300 MG: 300 CAPSULE ORAL at 21:12

## 2018-08-09 RX ADMIN — DIPHENHYDRAMINE HCL 25 MG: 25 TABLET ORAL at 09:09

## 2018-08-09 RX ADMIN — MELOXICAM 7.5 MG: 7.5 TABLET ORAL at 09:09

## 2018-08-09 RX ADMIN — AMOXICILLIN AND CLAVULANATE POTASSIUM 1 TABLET: 875; 125 TABLET, FILM COATED ORAL at 09:09

## 2018-08-09 RX ADMIN — PROMETHAZINE HYDROCHLORIDE 12.5 MG: 12.5 TABLET ORAL at 08:06

## 2018-08-09 RX ADMIN — GABAPENTIN 300 MG: 300 CAPSULE ORAL at 09:08

## 2018-08-09 RX ADMIN — QUETIAPINE FUMARATE 100 MG: 100 TABLET, FILM COATED ORAL at 22:04

## 2018-08-09 RX ADMIN — BACLOFEN 10 MG: 10 TABLET ORAL at 21:12

## 2018-08-09 RX ADMIN — ACETAMINOPHEN 500 MG: 500 TABLET, FILM COATED ORAL at 07:57

## 2018-08-09 RX ADMIN — MAGNESIUM HYDROXIDE 10 ML: 400 SUSPENSION ORAL at 18:47

## 2018-08-09 RX ADMIN — DULOXETINE HYDROCHLORIDE 90 MG: 60 CAPSULE, DELAYED RELEASE ORAL at 09:09

## 2018-08-09 RX ADMIN — HYDROXYZINE HYDROCHLORIDE 25 MG: 25 TABLET, FILM COATED ORAL at 22:04

## 2018-08-09 ASSESSMENT — PAIN SCALES - GENERAL
PAINLEVEL_OUTOF10: 5
PAINLEVEL_OUTOF10: 6
PAINLEVEL_OUTOF10: 6
PAINLEVEL_OUTOF10: 2
PAINLEVEL_OUTOF10: 6

## 2018-08-09 ASSESSMENT — PAIN DESCRIPTION - LOCATION: LOCATION: HEAD

## 2018-08-09 ASSESSMENT — PAIN DESCRIPTION - PAIN TYPE: TYPE: ACUTE PAIN

## 2018-08-09 NOTE — PLAN OF CARE
Problem: Anger Management/Homicidal Ideation:  Intervention: Group therapy sessions to identify coping skills  Psychoeducation Group Note    Date: 8/9/18  Start Time: 1430  End Time: 1510    Number Participants in Group:  6/22    Goal:  Patient will demonstrate increased interpersonal interaction   Topic: open discussion, communication skills    Discipline Responsible:   OT  AT  [facility]. x RT  Other       Participation Level:     None  Minimal   x Active Listener x Interactive    Monopolizing         Participation Quality:  x Appropriate  Inappropriate   x       Attentive        Intrusive          Sharing        Resistant          Supportive        Lethargic       Affective:   x Congruent  Incongruent  Blunted  Flat    Constricted  Anxious  Elated  Angry    Labile  Depressed  Other         Cognitive:  x Alert x Oriented PPTP     Concentration x G  F  P   Attention Span x G  F  P   Short-Term Memory  G  F  P   Long-Term Memory  G  F  P   ProblemSolving/  Decision Making x G  F  P   Ability to Process  Information x G  F  P      Contributing Factors             Delusional             Hallucinating             Flight of Ideas             Other:       Modes of Intervention:  x Education x Support  Exploration    Clarifying x Problem Solving  Confrontation   x Socialization  Limit Setting  Reality Testing   x Activity  Movement  Media    Other:            Response to Learning:  x Able to verbalize current knowledge/experience   x Able to verbalize/acknowledge new learning    Able to retain information   x Capable of insight   x Able to change behavior    Progressing to goal    Other:        Comments:

## 2018-08-09 NOTE — BH NOTE
Patient is having psychotic symptoms such as delusions of persecution and delusions of reference. He believes that people are sending demons to kill him. He is hearing voices making derogatory remarks. Some of the voices are telling him to beat up people. Some voices are telling him to kill himself and die. He complains of agitation and irritability. He feels that he is going to explode and beat up someone. He has poor eye contact. He has poor rapport. He has no insight. His judgement is impaired. Plan: to continue to monitor his progress. Supportive and insight oriented therapy were provided.   Marisela Inch 32 y.o. male    Vitals:    08/08/18 2015   BP: (!) 152/98   Pulse: 99   Resp: 16   Temp: 98.4 °F (36.9 °C)   SpO2:        Review of systems  Constitutional:  negative for chills, fevers, sweats  Respiratory:  negative for cough, dyspnea on exertion, hemoptysis, shortness of breath, wheezing  Cardiovascular:  negative for chest pain, chest pressure/discomfort, lower extremity edema, palpitations  Gastrointestinal:  negative for abdominal pain, constipation, diarrhea, nausea, vomiting  Neurological:  negative for dizziness, headache

## 2018-08-10 PROCEDURE — 6370000000 HC RX 637 (ALT 250 FOR IP): Performed by: PSYCHIATRY & NEUROLOGY

## 2018-08-10 PROCEDURE — 6360000002 HC RX W HCPCS: Performed by: PSYCHIATRY & NEUROLOGY

## 2018-08-10 PROCEDURE — 1240000000 HC EMOTIONAL WELLNESS R&B

## 2018-08-10 RX ADMIN — DIPHENHYDRAMINE HCL 25 MG: 25 TABLET ORAL at 09:29

## 2018-08-10 RX ADMIN — HYDROXYZINE HYDROCHLORIDE 25 MG: 25 TABLET, FILM COATED ORAL at 17:11

## 2018-08-10 RX ADMIN — DOXYCYCLINE 100 MG: 100 CAPSULE ORAL at 09:33

## 2018-08-10 RX ADMIN — METHADONE HYDROCHLORIDE 94 MG: 10 SOLUTION ORAL at 09:37

## 2018-08-10 RX ADMIN — ACETAMINOPHEN 500 MG: 500 TABLET, FILM COATED ORAL at 08:20

## 2018-08-10 RX ADMIN — BUPROPION HYDROCHLORIDE 100 MG: 100 TABLET, FILM COATED ORAL at 09:34

## 2018-08-10 RX ADMIN — ARIPIPRAZOLE 10 MG: 10 TABLET ORAL at 09:33

## 2018-08-10 RX ADMIN — GABAPENTIN 300 MG: 300 CAPSULE ORAL at 14:34

## 2018-08-10 RX ADMIN — GABAPENTIN 300 MG: 300 CAPSULE ORAL at 09:33

## 2018-08-10 RX ADMIN — GABAPENTIN 300 MG: 300 CAPSULE ORAL at 21:05

## 2018-08-10 RX ADMIN — BACLOFEN 10 MG: 10 TABLET ORAL at 09:34

## 2018-08-10 RX ADMIN — MAGNESIUM HYDROXIDE 10 ML: 400 SUSPENSION ORAL at 12:19

## 2018-08-10 RX ADMIN — ACETAMINOPHEN 500 MG: 500 TABLET, FILM COATED ORAL at 21:04

## 2018-08-10 RX ADMIN — HYDROXYZINE HYDROCHLORIDE 25 MG: 25 TABLET, FILM COATED ORAL at 23:04

## 2018-08-10 RX ADMIN — DULOXETINE HYDROCHLORIDE 90 MG: 60 CAPSULE, DELAYED RELEASE ORAL at 09:30

## 2018-08-10 RX ADMIN — MELOXICAM 7.5 MG: 7.5 TABLET ORAL at 09:33

## 2018-08-10 RX ADMIN — PROMETHAZINE HYDROCHLORIDE 12.5 MG: 12.5 TABLET ORAL at 12:18

## 2018-08-10 RX ADMIN — PROMETHAZINE HYDROCHLORIDE 12.5 MG: 12.5 TABLET ORAL at 21:05

## 2018-08-10 RX ADMIN — QUETIAPINE FUMARATE 100 MG: 100 TABLET, FILM COATED ORAL at 23:17

## 2018-08-10 RX ADMIN — ACETAMINOPHEN 500 MG: 500 TABLET, FILM COATED ORAL at 16:37

## 2018-08-10 RX ADMIN — DIPHENHYDRAMINE HCL 25 MG: 25 TABLET ORAL at 21:05

## 2018-08-10 RX ADMIN — BACLOFEN 10 MG: 10 TABLET ORAL at 21:05

## 2018-08-10 ASSESSMENT — PAIN DESCRIPTION - LOCATION
LOCATION: LEG
LOCATION: LEG

## 2018-08-10 ASSESSMENT — PAIN SCALES - GENERAL
PAINLEVEL_OUTOF10: 6
PAINLEVEL_OUTOF10: 5
PAINLEVEL_OUTOF10: 1
PAINLEVEL_OUTOF10: 10

## 2018-08-10 ASSESSMENT — PAIN - FUNCTIONAL ASSESSMENT
PAIN_FUNCTIONAL_ASSESSMENT: 0-10

## 2018-08-10 ASSESSMENT — PAIN DESCRIPTION - DESCRIPTORS
DESCRIPTORS: ACHING;DISCOMFORT
DESCRIPTORS: HEADACHE

## 2018-08-10 ASSESSMENT — PAIN DESCRIPTION - PAIN TYPE
TYPE: CHRONIC PAIN
TYPE: CHRONIC PAIN

## 2018-08-10 NOTE — PLAN OF CARE
Problem: Anger Management/Homicidal Ideation:  Intervention: Encourage patient's verbalization of feelings regarding helplessness or lack of control  [facility] Interdisciplinary Treatment Plan Note     Review Date & Time: 8/10/18 1345    Admission Type:   Admission Type:  Involuntary    Reason for admission:  Reason for Admission: depressed; SI with plan to cut throat or shoot self (has access to guns), HI no plan but towards someone who killed his brother    Estimated Length of Stay:  8-14 days  Estimated Discharge Date Update:    to be determined by physician    PATIENT STRENGTHS:  Patient Strengths:Strengths: No significant Physical Illness  Patient Strengths and Limitations:Limitations: Difficulty problem solving/relies on others to help solve problems, Demonstrates discomfort with /lack of social skills  Addictive Behavior:Addictive Behavior  In the past 3 months, have you felt or has someone told you that you have a problem with:  : None  Do you have a history of Chemical Use?: No  Do you have a history of Alcohol Use?: No  Do you have a history of Street Drug Abuse?: No  Histroy of Prescripton Drug Abuse?: No  Medical Problems:   Past Medical History:   Diagnosis Date    Anxiety     Arthritis     Asthma     Bipolar I disorder, most recent episode (or current) depressed, unspecified 9/12/2014    Clostridium difficile infection     COPD (chronic obstructive pulmonary disease) ([address]     Depression     Disease of blood and blood forming organ     Eczema     Fracture, metacarpal     R 4th and 5th    Gastric ulcer     Gastritis 06/13/2018    GERD (gastroesophageal reflux disease)     GI bleed     H. 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 Changes to plan of Care:  No    Status EXAM:   Status and Exam  Normal: No  Facial Expression: Flat  Affect: Appropriate  Level of Consciousness: Alert  Mood:Normal: Yes  Mood: Suspicious  Motor Activity:Normal: Yes  Motor Activity: Decreased  Interview Behavior: Cooperative  Preception: [place] to Person, [place] to Time, [place] to Place, [place] to Situation  Attention:Normal: Yes  Attention: Distractible  Thought Processes: Circumstantial  Thought Content:Normal: No  Thought Content: Paranoia, Preoccupations  Hallucinations: None  Delusions: Yes  Delusions: Persecution  Memory:Normal: Yes  Memory: Confabulation  Insight and Judgment: No  Insight and Judgment: Poor Judgment, Poor Insight  Present Suicidal Ideation: No  Present Homicidal Ideation: No    Daily Assessment Last Entry:   Daily Sleep (WDL): Within Defined Limits         Patient Currently in Pain: Yes  Daily Nutrition (WDL): Within Defined Limits    Patient Monitoring:  Frequency of Checks: 4 times per hour, close    Psychiatric Symptoms:   Depression Symptoms  Depression Symptoms: No problems reported or observed. Anxiety Symptoms  Anxiety Symptoms: No problems reported or observed. Elizabeth Symptoms  Elizabeth Symptoms: No problems reported or observed.      Psychosis Symptoms  Delusion Type: Paranoid    Suicide Risk CSSR-S:  1) Within the past month, have you wished you were dead or wished you could go to sleep and not wake up? : YES  2) Within the past month, have you actually had any thoughts of killing yourself? : YES  3) Within the past month, have you been thinking about how you might kill yourself? : YES  5) Within the past month, have you started to work out or worked out the details of how to kill yourself? Do you intend to carry out this plan? : YES  6)  Have you ever done anything, started to do anything, or prepared to do anything to end your life?: YES  Change in result:     Change in plan of care:       EDUCATION:   Learner Progress Toward Treatment Goals:    Reviewed results and recommendations of this team, reviewed group plan and strategies, reviewed signs, symptoms and risk of self harm and violent behavior, reviewed goals and plan of care. Method:  individual education, small group, verbal education. Outcome:     Pt verbalized understanding, but needs reinforcement to obtain goals. PATIENT GOALS:  Short term: Discharge planning, follow up with Bertin Stauffer term:  Change living situation    PLAN/TREATMENT RECOMMENDATIONS UPDATE:   Continue with group therapies, education of coping skills   Continue to monitor patient on unit. Medications provided/medication compliance by patient. Continue for plans to obtain long term goals after discharge.     SHORT-TERM GOALS UPDATE:  Time frame for Short-Term Goals:  8-14days     LONG-TERM GOALS UPDATE:  Time frame for Long-Term Goals:  6 months    Members Present in Team Meeting:   See signature sheet  DIANE Danielle        Problem: Depressive Behavior With or Without Suicide Precautions:  Goal: Able to verbalize acceptance of life and situations over which he or she has no control  Able to verbalize acceptance of life and situations over which he or she has no control   Outcome: Ongoing    Goal: Able to verbalize and/or display a decrease in depressive symptoms  Able to verbalize and/or display a decrease in depressive symptoms   Outcome: Ongoing      Problem: Falls - Risk of:  Goal: Will remain free from falls  Will remain free from falls   Outcome: Ongoing

## 2018-08-10 NOTE — PLAN OF CARE
Problem: Pain:  Goal: Control of chronic pain  Control of chronic pain   Outcome: Ongoing  Chelsea Sofia continues to complain of pain in his legs, head, and \"all over sometimes\". He's currently attending group therapy. He denies SI/HI.

## 2018-08-11 PROCEDURE — 6370000000 HC RX 637 (ALT 250 FOR IP): Performed by: PSYCHIATRY & NEUROLOGY

## 2018-08-11 PROCEDURE — 6360000002 HC RX W HCPCS: Performed by: PSYCHIATRY & NEUROLOGY

## 2018-08-11 PROCEDURE — 1240000000 HC EMOTIONAL WELLNESS R&B

## 2018-08-11 RX ADMIN — MAGNESIUM HYDROXIDE 10 ML: 400 SUSPENSION ORAL at 16:35

## 2018-08-11 RX ADMIN — DOXYCYCLINE 100 MG: 100 CAPSULE ORAL at 10:01

## 2018-08-11 RX ADMIN — PROMETHAZINE HYDROCHLORIDE 12.5 MG: 12.5 TABLET ORAL at 13:05

## 2018-08-11 RX ADMIN — DULOXETINE HYDROCHLORIDE 90 MG: 60 CAPSULE, DELAYED RELEASE ORAL at 10:02

## 2018-08-11 RX ADMIN — GABAPENTIN 300 MG: 300 CAPSULE ORAL at 10:01

## 2018-08-11 RX ADMIN — BACLOFEN 10 MG: 10 TABLET ORAL at 10:02

## 2018-08-11 RX ADMIN — MELOXICAM 7.5 MG: 7.5 TABLET ORAL at 10:02

## 2018-08-11 RX ADMIN — PROMETHAZINE HYDROCHLORIDE 12.5 MG: 12.5 TABLET ORAL at 22:23

## 2018-08-11 RX ADMIN — ACETAMINOPHEN 500 MG: 500 TABLET, FILM COATED ORAL at 16:35

## 2018-08-11 RX ADMIN — DIPHENHYDRAMINE HCL 25 MG: 25 TABLET ORAL at 21:11

## 2018-08-11 RX ADMIN — BACLOFEN 10 MG: 10 TABLET ORAL at 22:23

## 2018-08-11 RX ADMIN — GABAPENTIN 300 MG: 300 CAPSULE ORAL at 21:11

## 2018-08-11 RX ADMIN — HYDROXYZINE HYDROCHLORIDE 25 MG: 25 TABLET, FILM COATED ORAL at 13:08

## 2018-08-11 RX ADMIN — HYDROXYZINE HYDROCHLORIDE 25 MG: 25 TABLET, FILM COATED ORAL at 21:11

## 2018-08-11 RX ADMIN — METHADONE HYDROCHLORIDE 94 MG: 10 SOLUTION ORAL at 10:00

## 2018-08-11 RX ADMIN — ACETAMINOPHEN 500 MG: 500 TABLET, FILM COATED ORAL at 21:11

## 2018-08-11 RX ADMIN — DIPHENHYDRAMINE HCL 25 MG: 25 TABLET ORAL at 10:01

## 2018-08-11 RX ADMIN — QUETIAPINE FUMARATE 100 MG: 100 TABLET, FILM COATED ORAL at 22:23

## 2018-08-11 RX ADMIN — GABAPENTIN 300 MG: 300 CAPSULE ORAL at 13:08

## 2018-08-11 RX ADMIN — ARIPIPRAZOLE 10 MG: 10 TABLET ORAL at 10:01

## 2018-08-11 RX ADMIN — BUPROPION HYDROCHLORIDE 100 MG: 100 TABLET, FILM COATED ORAL at 10:02

## 2018-08-11 RX ADMIN — ACETAMINOPHEN 500 MG: 500 TABLET, FILM COATED ORAL at 13:05

## 2018-08-11 ASSESSMENT — PAIN SCALES - GENERAL
PAINLEVEL_OUTOF10: 6
PAINLEVEL_OUTOF10: 2
PAINLEVEL_OUTOF10: 5
PAINLEVEL_OUTOF10: 0
PAINLEVEL_OUTOF10: 2
PAINLEVEL_OUTOF10: 2
PAINLEVEL_OUTOF10: 0
PAINLEVEL_OUTOF10: 3

## 2018-08-11 ASSESSMENT — PAIN DESCRIPTION - LOCATION: LOCATION: LEG

## 2018-08-11 ASSESSMENT — PAIN - FUNCTIONAL ASSESSMENT
PAIN_FUNCTIONAL_ASSESSMENT: 0-10
PAIN_FUNCTIONAL_ASSESSMENT: 0-10

## 2018-08-11 ASSESSMENT — PAIN DESCRIPTION - FREQUENCY: FREQUENCY: INTERMITTENT

## 2018-08-11 ASSESSMENT — PAIN DESCRIPTION - ORIENTATION: ORIENTATION: RIGHT;LEFT

## 2018-08-11 ASSESSMENT — PAIN DESCRIPTION - PAIN TYPE: TYPE: ACUTE PAIN

## 2018-08-11 ASSESSMENT — PAIN DESCRIPTION - DESCRIPTORS: DESCRIPTORS: ACHING;DISCOMFORT

## 2018-08-11 NOTE — PLAN OF CARE
Problem: Anger Management/Homicidal Ideation:  Intervention: Encourage patient's verbalization of feelings regarding helplessness or lack of control  PSYCHOEDUCATION GROUP NOTE  Date: 8/11/2018 Start Time: 0900 End Time: 3394  Number Participants in Group: 12/23  Goal: Patient will demonstrate increased interpersonal interaction   Topic: Community meeting/ Goals group  ? Discipline Responsible:   OT  AT  [facility]. X RT MHP Other   ? Participation Level:    None X Minimal   X Active Listener  Interactive    Monopolizing     ? Participation Quality:  X Appropriate ? Inappropriate   X Attentive ? Intrusive    Sharing ? Resistant   X Supportive ? Lethargic   ? Affective:   X Congruent ? Incongruent ? Blunted ? Flat   ? Constricted ? Anxious ? Elated ? Angry   ? Labile ? Depressed ? Other ? appropriate   ? Cognitive:  X Alert X Oriented PPTP     Concentration X G ? F ? P   Attention Span X G  F  P   Short-Term Memory ? G ? F ? P   Long-Term Memory ? G ? F ? P   ProblemSolving/  Decision Making ? G ? F ? P   Ability to Process  Information  G ?X F ? P     ? Contributing Factors   ? Delusional   ? Hallucinating   ? Flight of Ideas   ? Other:   ? Modes of Intervention:  x Education x Support x Exploration   ? Clarifying x Problem Solving ? Confrontation   x Socialization ? Limit Setting ? Reality Testing   x Activity ? Movement ? Media   ? Other:  ? ? ? ?   ? Response to Learning:  X Able to verbalize current knowledge/experience   X Able to verbalize/acknowledge new learning   X Able to retain information   X Capable of insight   ? Able to change behavior   X Progressing to goal   ? Other:    ?  Comments: PT STATED HIS GOAL FOR THE DAY IS TO KEEP HIS MOOD STABLE.

## 2018-08-12 PROCEDURE — 1240000000 HC EMOTIONAL WELLNESS R&B

## 2018-08-12 PROCEDURE — 6370000000 HC RX 637 (ALT 250 FOR IP): Performed by: PSYCHIATRY & NEUROLOGY

## 2018-08-12 RX ADMIN — DIPHENHYDRAMINE HCL 25 MG: 25 TABLET ORAL at 08:25

## 2018-08-12 RX ADMIN — METHADONE HYDROCHLORIDE 94 MG: 10 SOLUTION ORAL at 08:25

## 2018-08-12 RX ADMIN — GABAPENTIN 300 MG: 300 CAPSULE ORAL at 13:50

## 2018-08-12 RX ADMIN — GABAPENTIN 300 MG: 300 CAPSULE ORAL at 23:21

## 2018-08-12 RX ADMIN — HYDROXYZINE HYDROCHLORIDE 25 MG: 25 TABLET, FILM COATED ORAL at 08:25

## 2018-08-12 RX ADMIN — DOXYCYCLINE 100 MG: 100 CAPSULE ORAL at 08:25

## 2018-08-12 RX ADMIN — BACLOFEN 10 MG: 10 TABLET ORAL at 08:25

## 2018-08-12 RX ADMIN — ARIPIPRAZOLE 10 MG: 10 TABLET ORAL at 08:25

## 2018-08-12 RX ADMIN — BUPROPION HYDROCHLORIDE 100 MG: 100 TABLET, FILM COATED ORAL at 08:25

## 2018-08-12 RX ADMIN — HYDROXYZINE HYDROCHLORIDE 25 MG: 25 TABLET, FILM COATED ORAL at 23:21

## 2018-08-12 RX ADMIN — BACLOFEN 10 MG: 10 TABLET ORAL at 23:21

## 2018-08-12 RX ADMIN — MELOXICAM 7.5 MG: 7.5 TABLET ORAL at 08:24

## 2018-08-12 RX ADMIN — DULOXETINE HYDROCHLORIDE 90 MG: 60 CAPSULE, DELAYED RELEASE ORAL at 08:25

## 2018-08-12 RX ADMIN — DIPHENHYDRAMINE HCL 25 MG: 25 TABLET ORAL at 23:21

## 2018-08-12 RX ADMIN — QUETIAPINE FUMARATE 100 MG: 100 TABLET, FILM COATED ORAL at 23:21

## 2018-08-12 RX ADMIN — GABAPENTIN 300 MG: 300 CAPSULE ORAL at 08:25

## 2018-08-12 ASSESSMENT — PAIN SCALES - GENERAL
PAINLEVEL_OUTOF10: 3
PAINLEVEL_OUTOF10: 2

## 2018-08-12 NOTE — BH NOTE
Psychoeducation Group Note    Date: 8/12/18                           Start Time: 910am  End Time:  945am    Number Participants in Group:  13    Goal:  Patient will demonstrate increased interpersonal interaction   Topic: Community Meeting/Goals Group and Discussion about Support et Social Resources/Outlets     Discipline Responsible:   OT  AT  [facility]. x RT  Other       Participation Level:     None  Minimal   x Active Listener x Interactive    Monopolizing         Participation Quality:   Appropriate  Inappropriate   x       Attentive        Intrusive   x       Sharing        Resistant          Supportive        Lethargic       Affective:    Congruent  Incongruent X Blunted  Flat    Constricted  Anxious  Elated  Angry    Labile  Depressed  Other x brightens       Cognitive:  x Alert x Oriented PPTP     Concentration  G X F  P   Attention Span  G X F  P   Short-Term Memory  G  F  P   Long-Term Memory  G  F  P   ProblemSolving/  Decision Making  G x F  P   Ability to Process  Information  G X F  P      Contributing Factors             Delusional             Hallucinating             Flight of Ideas             Other:       Modes of Intervention:  x Education x Support x Exploration   x Clarifying x Problem Solving  Confrontation    Socialization  Limit Setting  Reality Testing   x Activity  Movement  Media    Other:            Response to Learning:  x Able to verbalize current knowledge/experience   x Able to acknowledge new learning:LIMITED D/T PREOCCUPIED WITH BEING DISCHARGE   x Able to retain information    Capable of insight    Able to change behavior   x Progressing to goal    Other:        Comments:

## 2018-08-13 ENCOUNTER — HOSPITAL ENCOUNTER (EMERGENCY)
Age: 28
Discharge: LEFT BEFORE DISCHARGE | End: 2018-08-13
Payer: MEDICARE

## 2018-08-13 VITALS
TEMPERATURE: 98.8 F | SYSTOLIC BLOOD PRESSURE: 167 MMHG | RESPIRATION RATE: 14 BRPM | WEIGHT: 250 LBS | BODY MASS INDEX: 35.79 KG/M2 | HEART RATE: 90 BPM | HEIGHT: 70 IN | DIASTOLIC BLOOD PRESSURE: 92 MMHG | OXYGEN SATURATION: 94 %

## 2018-08-13 VITALS
WEIGHT: 227.38 LBS | RESPIRATION RATE: 14 BRPM | HEIGHT: 69 IN | DIASTOLIC BLOOD PRESSURE: 73 MMHG | HEART RATE: 68 BPM | TEMPERATURE: 97.8 F | OXYGEN SATURATION: 97 % | SYSTOLIC BLOOD PRESSURE: 119 MMHG | BODY MASS INDEX: 33.68 KG/M2

## 2018-08-13 PROBLEM — F31.9 BIPOLAR 1 DISORDER (HCC): Status: RESOLVED | Noted: 2018-07-05 | Resolved: 2018-08-13

## 2018-08-13 PROCEDURE — 5130000000 HC BRIDGE APPOINTMENT

## 2018-08-13 PROCEDURE — 6360000002 HC RX W HCPCS: Performed by: PSYCHIATRY & NEUROLOGY

## 2018-08-13 PROCEDURE — 6370000000 HC RX 637 (ALT 250 FOR IP): Performed by: PSYCHIATRY & NEUROLOGY

## 2018-08-13 RX ORDER — QUETIAPINE FUMARATE 100 MG/1
100 TABLET, FILM COATED ORAL NIGHTLY
Qty: 30 TABLET | Refills: 0 | Status: ON HOLD | OUTPATIENT
Start: 2018-08-13 | End: 2018-10-01

## 2018-08-13 RX ORDER — DOXYCYCLINE 100 MG/1
100 CAPSULE ORAL DAILY
Qty: 30 CAPSULE | Refills: 0 | Status: ON HOLD | OUTPATIENT
Start: 2018-08-14 | End: 2018-10-01

## 2018-08-13 RX ORDER — FLUTICASONE FUROATE AND VILANTEROL 200; 25 UG/1; UG/1
1 POWDER RESPIRATORY (INHALATION) DAILY
Qty: 1 EACH | Refills: 0 | Status: ON HOLD | OUTPATIENT
Start: 2018-08-13 | End: 2018-10-01

## 2018-08-13 RX ORDER — ARIPIPRAZOLE 10 MG/1
10 TABLET ORAL DAILY
Qty: 30 TABLET | Refills: 0 | Status: ON HOLD | OUTPATIENT
Start: 2018-08-14 | End: 2018-10-01 | Stop reason: HOSPADM

## 2018-08-13 RX ORDER — ERGOCALCIFEROL 1.25 MG/1
50000 CAPSULE ORAL WEEKLY
Qty: 4 CAPSULE | Refills: 0 | Status: ON HOLD | OUTPATIENT
Start: 2018-08-13 | End: 2018-09-19

## 2018-08-13 RX ORDER — BUPROPION HYDROCHLORIDE 100 MG/1
100 TABLET ORAL DAILY
Qty: 30 TABLET | Refills: 0 | Status: SHIPPED | OUTPATIENT
Start: 2018-08-14 | End: 2018-09-17

## 2018-08-13 RX ORDER — MELOXICAM 7.5 MG/1
7.5 TABLET ORAL DAILY
Qty: 30 TABLET | Refills: 0 | Status: ON HOLD | OUTPATIENT
Start: 2018-08-14 | End: 2018-09-19

## 2018-08-13 RX ORDER — DULOXETIN HYDROCHLORIDE 30 MG/1
90 CAPSULE, DELAYED RELEASE ORAL DAILY
Qty: 90 CAPSULE | Refills: 0 | Status: ON HOLD | OUTPATIENT
Start: 2018-08-14 | End: 2018-10-01 | Stop reason: HOSPADM

## 2018-08-13 RX ORDER — PANTOPRAZOLE SODIUM 40 MG/1
40 TABLET, DELAYED RELEASE ORAL DAILY
Qty: 30 TABLET | Refills: 0 | Status: SHIPPED | OUTPATIENT
Start: 2018-08-13 | End: 2018-08-22

## 2018-08-13 RX ORDER — SUCRALFATE 1 G/1
1 TABLET ORAL 4 TIMES DAILY
Qty: 40 TABLET | Refills: 0 | Status: SHIPPED | OUTPATIENT
Start: 2018-08-13 | End: 2018-09-17

## 2018-08-13 RX ORDER — PROMETHAZINE HYDROCHLORIDE 12.5 MG/1
12.5 TABLET ORAL EVERY 6 HOURS PRN
Qty: 30 TABLET | Refills: 0 | Status: SHIPPED | OUTPATIENT
Start: 2018-08-13 | End: 2018-08-20

## 2018-08-13 RX ORDER — ALBUTEROL SULFATE 90 UG/1
2 AEROSOL, METERED RESPIRATORY (INHALATION) EVERY 6 HOURS PRN
Qty: 18 G | Refills: 0 | Status: ON HOLD | OUTPATIENT
Start: 2018-08-13 | End: 2018-10-01

## 2018-08-13 RX ORDER — OLANZAPINE 10 MG/1
10 TABLET ORAL NIGHTLY
Qty: 30 TABLET | Refills: 0 | Status: SHIPPED | OUTPATIENT
Start: 2018-08-13 | End: 2018-09-17

## 2018-08-13 RX ORDER — DIPHENHYDRAMINE HCL 25 MG
25 TABLET ORAL 2 TIMES DAILY
Qty: 60 TABLET | Refills: 0 | Status: SHIPPED | OUTPATIENT
Start: 2018-08-13 | End: 2018-09-12

## 2018-08-13 RX ORDER — GABAPENTIN 300 MG/1
300 CAPSULE ORAL 3 TIMES DAILY
Qty: 90 CAPSULE | Refills: 0 | Status: ON HOLD | OUTPATIENT
Start: 2018-08-13 | End: 2018-10-01

## 2018-08-13 RX ADMIN — PROMETHAZINE HYDROCHLORIDE 12.5 MG: 12.5 TABLET ORAL at 10:56

## 2018-08-13 RX ADMIN — ACETAMINOPHEN 500 MG: 500 TABLET, FILM COATED ORAL at 10:56

## 2018-08-13 ASSESSMENT — PAIN DESCRIPTION - LOCATION: LOCATION: FLANK

## 2018-08-13 ASSESSMENT — PAIN SCALES - GENERAL
PAINLEVEL_OUTOF10: 10
PAINLEVEL_OUTOF10: 9
PAINLEVEL_OUTOF10: 8
PAINLEVEL_OUTOF10: 10

## 2018-08-13 ASSESSMENT — PAIN DESCRIPTION - PAIN TYPE: TYPE: ACUTE PAIN

## 2018-08-13 ASSESSMENT — PAIN DESCRIPTION - DESCRIPTORS: DESCRIPTORS: POUNDING

## 2018-08-13 ASSESSMENT — PAIN DESCRIPTION - FREQUENCY: FREQUENCY: CONTINUOUS

## 2018-08-13 ASSESSMENT — PAIN DESCRIPTION - ORIENTATION: ORIENTATION: LEFT

## 2018-08-13 ASSESSMENT — PAIN DESCRIPTION - PROGRESSION: CLINICAL_PROGRESSION: GRADUALLY WORSENING

## 2018-08-13 ASSESSMENT — PAIN DESCRIPTION - ONSET: ONSET: SUDDEN

## 2018-08-13 ASSESSMENT — PAIN DESCRIPTION - DIRECTION: RADIATING_TOWARDS: RADIATES TO BACK

## 2018-08-13 NOTE — BH NOTE
Pt did not attend creative expression and coping skills group at 1100 despite staff invitation to attend.

## 2018-08-13 NOTE — PLAN OF CARE
Problem: Depressive Behavior With or Without Suicide Precautions:  Goal: Able to verbalize acceptance of life and situations over which he or she has no control  Able to verbalize acceptance of life and situations over which he or she has no control   Outcome: Ongoing  Patient has been out in the milieu and social with peers. Patient admits to feeling anxious and having paranoid thoughts. Patient states he wants discharge but is not sure he is ready because he does not know where he will live when he leaves here. Patient denies any delusions. Patient has been compliant with treatment. Goal: Able to verbalize and/or display a decrease in depressive symptoms  Able to verbalize and/or display a decrease in depressive symptoms   Outcome: Ongoing  Patient denies any suicidal thoughts at this time. Patient agrees to come talk with staff if having any thoughts to harm himself this shift. 15 min rounds continued for patient safety.

## 2018-08-13 NOTE — BH NOTE
[facility]  Discharge Note    Pt discharged with followings belongings:   Dentures: None  Vision - Corrective Lenses: None  Hearing Aid: None  Jewelry: None  Body Piercings Removed: N/A  Clothing: Footwear, Jacket / coat, Pants, Shirt, Socks, Undergarments (Comment), Other (Comment) ()  Were All Patient Medications Collected?: Yes  Other Valuables: Mayra Soares, Other (Comment) (Samsung Tablet (cracked face))   Valuables sent home with patient. Valuables retrieved from safe, Security envelope number:  [id number], [id number], [id number], [id number] and returned to patient. Patient left department with Departure Mode: By self, In cab via Mobility at Departure: Ambulatory, discharged to Discharged to: Other (Comment) [facility]). Patient education on aftercare instructions: Yes  Information faxed to [facility] by RN Patient verbalize understanding of AVS:  Yes.     Status EXAM upon discharge:  Status and Exam  Normal: No  Facial Expression: Exaggerated  Affect: Unstable  Level of Consciousness: Alert  Mood:Normal: No  Mood: Anxious, Irritable, Suspicious  Motor Activity:Normal: No  Motor Activity: Repetitive Acts  Interview Behavior: Evasive, Irritable  Preception: [place] to Person, Radene Goltz to Time, [place] to Place, [place] to Situation  Attention:Normal: No  Attention: Distractible  Thought Processes: Blocking  Thought Content:Normal: No  Thought Content: Preoccupations, Paranoia  Hallucinations: None  Delusions: Yes  Delusions: Obsessions  Memory:Normal: No  Memory: Poor Recent  Insight and Judgment: No  Insight and Judgment: Unrealistic, Unmotivated, Poor Insight, Poor Judgment  Present Suicidal Ideation: No  Present Homicidal Ideation: No    Conor Villagomez RN

## 2018-08-14 NOTE — DISCHARGE SUMMARY
appears  to be euthymic. He has appropriate affect. Thought process is within  normal limits. Thought contents are within normal limits. He denies any  hallucinations or delusions. He denies any suicidal or homicidal thoughts  or plans. He is of average intelligence. He is oriented to time, place,  and person. His memory to recent, remote, and immediate events are within  normal limits. He has adequate attention and concentration. His insight  and judgement are fair. His abstraction is fair. DIAGNOSES:  At the time of discharge,  1. Bipolar disorder, current episode depressed. 2.  Opioid dependence, currently on methadone. 3.  Rheumatoid arthritis. TREATMENT AND PLAN:  The patient is discharged. He will follow with [facility] and his PCP and rheumatologist and he  will go to [facility].         Nando Humphries    D: 08/13/2018 9:40:54       T: 08/13/2018 10:02:44     [email or web address]  Job#: [id number]     Doc#: [id number]    CC:

## 2018-08-15 ENCOUNTER — APPOINTMENT (OUTPATIENT)
Dept: CT IMAGING | Age: 28
End: 2018-08-15
Payer: MEDICARE

## 2018-08-15 ENCOUNTER — HOSPITAL ENCOUNTER (EMERGENCY)
Age: 28
Discharge: HOME | End: 2018-08-15
Attending: EMERGENCY MEDICINE
Payer: MEDICARE

## 2018-08-15 VITALS
SYSTOLIC BLOOD PRESSURE: 132 MMHG | OXYGEN SATURATION: 88 % | TEMPERATURE: 97.3 F | RESPIRATION RATE: 16 BRPM | WEIGHT: 240 LBS | DIASTOLIC BLOOD PRESSURE: 83 MMHG | BODY MASS INDEX: 34.44 KG/M2 | HEART RATE: 88 BPM

## 2018-08-15 DIAGNOSIS — R10.32 LEFT LOWER QUADRANT PAIN: ICD-10-CM

## 2018-08-15 DIAGNOSIS — Z76.5 DRUG-SEEKING BEHAVIOR: Primary | ICD-10-CM

## 2018-08-15 LAB
ABSOLUTE EOS #: 0.08 K/UL (ref 0–0.44)
ABSOLUTE IMMATURE GRANULOCYTE: <0.03 K/UL (ref 0–0.3)
ABSOLUTE LYMPH #: 2.62 K/UL (ref 1.1–3.7)
ABSOLUTE MONO #: 0.56 K/UL (ref 0.1–1.2)
ALBUMIN SERPL-MCNC: 4.3 G/DL (ref 3.5–5.2)
ALBUMIN/GLOBULIN RATIO: 1.2 (ref 1–2.5)
ALP BLD-CCNC: 62 U/L (ref 40–129)
ALT SERPL-CCNC: 44 U/L (ref 5–41)
ANION GAP SERPL CALCULATED.3IONS-SCNC: 10 MMOL/L (ref 9–17)
AST SERPL-CCNC: 48 U/L
BASOPHILS # BLD: 0 % (ref 0–2)
BASOPHILS ABSOLUTE: <0.03 K/UL (ref 0–0.2)
BILIRUB SERPL-MCNC: 0.42 MG/DL (ref 0.3–1.2)
BUN BLDV-MCNC: 10 MG/DL (ref 6–20)
BUN/CREAT BLD: ABNORMAL (ref 9–20)
CALCIUM SERPL-MCNC: 8.5 MG/DL (ref 8.6–10.4)
CHLORIDE BLD-SCNC: 97 MMOL/L (ref 98–107)
CO2: 28 MMOL/L (ref 20–31)
CREAT SERPL-MCNC: 0.85 MG/DL (ref 0.7–1.2)
DIFFERENTIAL TYPE: ABNORMAL
EOSINOPHILS RELATIVE PERCENT: 1 % (ref 1–4)
GFR AFRICAN AMERICAN: >60 ML/MIN
GFR NON-AFRICAN AMERICAN: >60 ML/MIN
GFR SERPL CREATININE-BSD FRML MDRD: ABNORMAL ML/MIN/{1.73_M2}
GFR SERPL CREATININE-BSD FRML MDRD: ABNORMAL ML/MIN/{1.73_M2}
GLUCOSE BLD-MCNC: 98 MG/DL (ref 70–99)
HCT VFR BLD CALC: 38.3 % (ref 40.7–50.3)
HEMOGLOBIN: 12.4 G/DL (ref 13–17)
IMMATURE GRANULOCYTES: 0 %
LIPASE: 19 U/L (ref 13–60)
LYMPHOCYTES # BLD: 40 % (ref 24–43)
MCH RBC QN AUTO: 29.5 PG (ref 25.2–33.5)
MCHC RBC AUTO-ENTMCNC: 32.4 G/DL (ref 28.4–34.8)
MCV RBC AUTO: 91 FL (ref 82.6–102.9)
MONOCYTES # BLD: 9 % (ref 3–12)
NRBC AUTOMATED: 0 PER 100 WBC
PDW BLD-RTO: 13.2 % (ref 11.8–14.4)
PLATELET # BLD: 254 K/UL (ref 138–453)
PLATELET ESTIMATE: ABNORMAL
PMV BLD AUTO: 9.8 FL (ref 8.1–13.5)
POTASSIUM SERPL-SCNC: 3.9 MMOL/L (ref 3.7–5.3)
RBC # BLD: 4.21 M/UL (ref 4.21–5.77)
RBC # BLD: ABNORMAL 10*6/UL
SEG NEUTROPHILS: 50 % (ref 36–65)
SEGMENTED NEUTROPHILS ABSOLUTE COUNT: 3.24 K/UL (ref 1.5–8.1)
SODIUM BLD-SCNC: 135 MMOL/L (ref 135–144)
TOTAL PROTEIN: 7.9 G/DL (ref 6.4–8.3)
WBC # BLD: 6.5 K/UL (ref 3.5–11.3)
WBC # BLD: ABNORMAL 10*3/UL

## 2018-08-15 PROCEDURE — 80053 COMPREHEN METABOLIC PANEL: CPT

## 2018-08-15 PROCEDURE — 85025 COMPLETE CBC W/AUTO DIFF WBC: CPT

## 2018-08-15 PROCEDURE — 99284 EMERGENCY DEPT VISIT MOD MDM: CPT

## 2018-08-15 PROCEDURE — 2580000003 HC RX 258: Performed by: EMERGENCY MEDICINE

## 2018-08-15 PROCEDURE — 83690 ASSAY OF LIPASE: CPT

## 2018-08-15 PROCEDURE — 6360000002 HC RX W HCPCS: Performed by: EMERGENCY MEDICINE

## 2018-08-15 PROCEDURE — 6370000000 HC RX 637 (ALT 250 FOR IP): Performed by: EMERGENCY MEDICINE

## 2018-08-15 RX ORDER — ONDANSETRON 4 MG/1
4 TABLET, FILM COATED ORAL ONCE
Status: COMPLETED | OUTPATIENT
Start: 2018-08-15 | End: 2018-08-15

## 2018-08-15 RX ORDER — AZITHROMYCIN 250 MG/1
1000 TABLET, FILM COATED ORAL ONCE
Status: COMPLETED | OUTPATIENT
Start: 2018-08-15 | End: 2018-08-15

## 2018-08-15 RX ORDER — 0.9 % SODIUM CHLORIDE 0.9 %
1000 INTRAVENOUS SOLUTION INTRAVENOUS ONCE
Status: COMPLETED | OUTPATIENT
Start: 2018-08-15 | End: 2018-08-15

## 2018-08-15 RX ORDER — DIPHENHYDRAMINE HCL 25 MG
25 TABLET ORAL ONCE
Status: COMPLETED | OUTPATIENT
Start: 2018-08-15 | End: 2018-08-15

## 2018-08-15 RX ORDER — HYDROCODONE BITARTRATE AND ACETAMINOPHEN 5; 325 MG/1; MG/1
1 TABLET ORAL ONCE
Status: DISCONTINUED | OUTPATIENT
Start: 2018-08-15 | End: 2018-08-15 | Stop reason: HOSPADM

## 2018-08-15 RX ORDER — CEFTRIAXONE SODIUM 250 MG/1
250 INJECTION, POWDER, FOR SOLUTION INTRAMUSCULAR; INTRAVENOUS ONCE
Status: DISCONTINUED | OUTPATIENT
Start: 2018-08-15 | End: 2018-08-15 | Stop reason: HOSPADM

## 2018-08-15 RX ADMIN — SODIUM CHLORIDE 1000 ML: 9 INJECTION, SOLUTION INTRAVENOUS at 02:33

## 2018-08-15 RX ADMIN — ONDANSETRON HYDROCHLORIDE 4 MG: 4 TABLET, FILM COATED ORAL at 02:34

## 2018-08-15 RX ADMIN — DIPHENHYDRAMINE HCL 25 MG: 25 TABLET ORAL at 06:07

## 2018-08-15 RX ADMIN — AZITHROMYCIN 1000 MG: 250 TABLET, FILM COATED ORAL at 06:07

## 2018-08-15 RX ADMIN — ONDANSETRON HYDROCHLORIDE 4 MG: 4 TABLET, FILM COATED ORAL at 06:07

## 2018-08-15 ASSESSMENT — PAIN DESCRIPTION - ORIENTATION: ORIENTATION: LEFT

## 2018-08-15 ASSESSMENT — PAIN DESCRIPTION - LOCATION: LOCATION: ABDOMEN

## 2018-08-15 ASSESSMENT — PAIN SCALES - GENERAL
PAINLEVEL_OUTOF10: 5
PAINLEVEL_OUTOF10: 6

## 2018-08-15 NOTE — ED NOTES
DEZ placed a call to provider Don to assist with transportation home upon D/C from ED   SW informed that he has used all of his trips and doesn''t have cab support      Pramod [place]  08/15/18 0601

## 2018-08-15 NOTE — ED PROVIDER NOTES
[facility]  ED  Emergency Department Encounter  Emergency Medicine Resident     Pt Name: Emilia Cuevas  MRN: [id number]  [place] 1990  Date of evaluation: 8/15/18  PCP:  Carine Adam MD    [address] [place] [address]       Chief Complaint   Patient presents with    Abdominal Pain     N/V green emesis since Sunday night, tenderness LLQ and LUQ, feels distended    Exposure to STD     Wants STD check       HISTORY OF PRESENT ILLNESS  (Location/Symptom, Timing/Onset, Context/Setting, Quality, Duration, Modifying Factors, Severity.)      Emilia Cuevas is a 32 y.o. male who presents with Left lower quadrant abdominal pain as well as intermittent nausea and vomiting for the past 5 days. Pain described as aching in nature, nonradiating, worse with palpation, nothing makes better. Pain intermittent. Patient denies any testicular pain  Patient also states she's been having dysuria for the past 3 days with states she has not been sexually active for many months. Patient denies any diarrhea, constipation, fever, chills, weakness, numbness, chest pain, shortness of breath. PAST MEDICAL / SURGICAL / SOCIAL / FAMILY HISTORY      has a past medical history of Anxiety; Arthritis; Asthma; Bipolar I disorder, most recent episode (or current) depressed, unspecified; Clostridium difficile infection; COPD (chronic obstructive pulmonary disease) ([address] Depression; Disease of blood and blood forming organ; Eczema; Fracture, metacarpal; Gastric ulcer; Gastritis; GERD (gastroesophageal reflux disease); GI bleed; H. pylori infection; H/O blood clots; Head injury; Headache; Insomnia; Juvenile rheumatoid arthritis ([address] Neuromuscular disorder ([address] PFAPA syndrome ([address] PUD (peptic ulcer disease); Rheumatoid arthritis ([address] Rheumatoid arthritis(714.0); Sleep apnea; 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[address] Substance abuse; Suicidal ideation; Suicide attempt by hanging ([address]  Tobacco dependence; Ulcerative colitis ([address] and UTI Historical Provider, MD       REVIEW OF SYSTEMS    (2-9 systems for level 4, 10 or more for level 5)      Constitutional ROS - No recent fevers, No recent chills  Neurological ROS - No Headache, No Syncope  Opthalmologic ROS- No eye pain, No vision changes   ENT ROS - No sore throat, No congestion  Respiratory ROS - No cough, No shortness of breath  Cardiovascular ROS - No chest pain, No palpitations   Gastrointestinal ROS - + abdominal pain, +nausea, + vomiting  Genito-Urinary ROS - No dysuria, No hematuria  Musculoskeletal ROS - No back pain, No neck pain  Dermatological ROS - No wound, No rash      PHYSICAL EXAM   (up to 7 for level 4, 8 or more for level 5)      INITIAL VITALS:   /83   Pulse 88   Temp 97.3 °F (36.3 °C) (Oral)   Resp 16   Wt 240 lb (108.9 kg)   SpO2 (!) 88%   BMI 34.44 kg/m²     CONSTITUTIONAL: AOx4, no apparent distress, appears stated age   HEAD: normocephalic, atraumatic   EYES: PERRL, EOMI    ENT: moist mucous membranes, uvula midline   NECK: supple, symmetric   BACK: symmetric   LUNGS: clear to auscultation bilaterally   CARDIOVASCULAR: regular rate and rhythm, no murmurs, rubs or gallops   ABDOMEN: soft, Reported left moderate tenderness with guarding.   Patient refusing to let me perform proper examination stating it hurts too much, non-distended   : deferred     NEUROLOGIC:  MAEx4, no focal sensory or motor deficits   MUSCULOSKELETAL: no clubbing, cyanosis or edema   SKIN: no exposed rash     DIFFERENTIAL  DIAGNOSIS     PLAN (LABS / Finas Savin / EKG):  Orders Placed This Encounter   Procedures    C.trachomatis N.gonorrhoeae DNA, Urine    CBC WITH AUTO DIFFERENTIAL    Comprehensive Metabolic Panel    LIPASE    Urinalysis Reflex to Culture       MEDICATIONS ORDERED:  Orders Placed This Encounter   Medications    0.9 % sodium chloride bolus    ondansetron (ZOFRAN) tablet 4 mg    HYDROcodone-acetaminophen (NORCO) 5-325 MG per tablet 1 tablet    diphenhydrAMINE (BENADRYL)

## 2018-08-15 NOTE — ED NOTES
Pt refusing CT and all PO medications. Dr. Wiliam Ragsdale notified and at bedside.      Whitley CoyEn[facility]  37/25/67 [id number]

## 2018-08-15 NOTE — ED NOTES
Pt refused to have CT done. CT staff informed pt that if he does not go now, it will be a much longer wait. Pt states he did not care and wanted to wait anyway.      Ced Houser RN  08/15/18 1447

## 2018-08-15 NOTE — ED PROVIDER NOTES
[facility]     Emergency Department     Faculty Attestation    I performed a history and physical examination of the patient and discussed management with the resident. I have reviewed and agree with the residents findings including all diagnostic interpretations, and treatment plans as written. Any areas of disagreement are noted on the chart. I was personally present for the key portions of any procedures. I have documented in the chart those procedures where I was not present during the key portions. I have reviewed the emergency nurses triage note. I agree with the chief complaint, past medical history, past surgical history, allergies, medications, social and family history as documented unless otherwise noted below. Documentation of the HPI, Physical Exam and Medical Decision Making performed by sethibmargo is based on my personal performance of the HPI, PE and MDM. For Physician Assistant/ Nurse Practitioner cases/documentation I have personally evaluated this patient and have completed at least one if not all key elements of the E/M (history, physical exam, and MDM). Additional findings are as noted. 31 yo M abdominal pain vomiting x 3d, no fever, request std check, appetite decreased, pe: tender bilateral lower extremities,     Wbc normal,     Ct ordered, pt refused ct 3 times, risk discussed, pt appears to have decision making capacity. Pt discharged,  I feel pt exhibiting drug seeking behavior,     Pre-hypertension/Hypertension: The patient has been informed that they may have pre-hypertension or Hypertension based on a blood pressure reading in the emergency department. I recommend that the patient call the primary care provider listed on their discharge instructions or a physician of their choice this week to arrange follow up for further evaluation of possible pre-hypertension or Hypertension.       EKG Interpretation    Interpreted by me      CRITICAL CARE: There was a high probability of clinically significant/life threatening deterioration in this patient's condition which required my urgent intervention. Total critical care time was 0   minutes. This excludes any time for separately reportable procedures.        [address] [place],   08/15/18 6060 [facility] DO  08/15/18 2227

## 2018-08-15 NOTE — ED NOTES
Patient refusing oral analgesic. Patient pushed this person's hand away while starting an IV and pulled the cathalon out and threw it across the room. Patient requesting US IV start.   Report given to [facility], RN  08/15/18 9920

## 2018-08-19 ENCOUNTER — APPOINTMENT (OUTPATIENT)
Dept: GENERAL RADIOLOGY | Age: 28
End: 2018-08-19
Payer: MEDICARE

## 2018-08-19 ENCOUNTER — HOSPITAL ENCOUNTER (EMERGENCY)
Age: 28
Discharge: HOME | End: 2018-08-19
Attending: EMERGENCY MEDICINE
Payer: MEDICARE

## 2018-08-19 VITALS
BODY MASS INDEX: 35.55 KG/M2 | HEART RATE: 76 BPM | DIASTOLIC BLOOD PRESSURE: 89 MMHG | RESPIRATION RATE: 13 BRPM | OXYGEN SATURATION: 93 % | TEMPERATURE: 97.7 F | SYSTOLIC BLOOD PRESSURE: 142 MMHG | HEIGHT: 69 IN | WEIGHT: 240 LBS

## 2018-08-19 DIAGNOSIS — K92.2 GASTROINTESTINAL HEMORRHAGE, UNSPECIFIED GASTROINTESTINAL HEMORRHAGE TYPE: ICD-10-CM

## 2018-08-19 DIAGNOSIS — R11.2 NAUSEA AND VOMITING, INTRACTABILITY OF VOMITING NOT SPECIFIED, UNSPECIFIED VOMITING TYPE: Primary | ICD-10-CM

## 2018-08-19 DIAGNOSIS — F17.200 SMOKER: ICD-10-CM

## 2018-08-19 LAB
ABSOLUTE EOS #: 0.07 K/UL (ref 0–0.44)
ABSOLUTE IMMATURE GRANULOCYTE: 0.03 K/UL (ref 0–0.3)
ABSOLUTE LYMPH #: 2.84 K/UL (ref 1.1–3.7)
ABSOLUTE MONO #: 0.78 K/UL (ref 0.1–1.2)
ALBUMIN SERPL-MCNC: 4.2 G/DL (ref 3.5–5.2)
ALBUMIN/GLOBULIN RATIO: 1.2 (ref 1–2.5)
ALP BLD-CCNC: 68 U/L (ref 40–129)
ALT SERPL-CCNC: 33 U/L (ref 5–41)
ANION GAP SERPL CALCULATED.3IONS-SCNC: 14 MMOL/L (ref 9–17)
AST SERPL-CCNC: 32 U/L
BASOPHILS # BLD: 0 % (ref 0–2)
BASOPHILS ABSOLUTE: <0.03 K/UL (ref 0–0.2)
BILIRUB SERPL-MCNC: 0.21 MG/DL (ref 0.3–1.2)
BILIRUBIN DIRECT: <0.08 MG/DL
BILIRUBIN, INDIRECT: ABNORMAL MG/DL (ref 0–1)
BUN BLDV-MCNC: 10 MG/DL (ref 6–20)
BUN/CREAT BLD: NORMAL (ref 9–20)
CALCIUM SERPL-MCNC: 8.7 MG/DL (ref 8.6–10.4)
CHLORIDE BLD-SCNC: 100 MMOL/L (ref 98–107)
CO2: 25 MMOL/L (ref 20–31)
CREAT SERPL-MCNC: 0.81 MG/DL (ref 0.7–1.2)
DIFFERENTIAL TYPE: ABNORMAL
EKG ATRIAL RATE: 87 BPM
EKG P AXIS: 75 DEGREES
EKG P-R INTERVAL: 142 MS
EKG Q-T INTERVAL: 366 MS
EKG QRS DURATION: 74 MS
EKG QTC CALCULATION (BAZETT): 440 MS
EKG R AXIS: 60 DEGREES
EKG T AXIS: 47 DEGREES
EKG VENTRICULAR RATE: 87 BPM
EOSINOPHILS RELATIVE PERCENT: 1 % (ref 1–4)
GFR AFRICAN AMERICAN: >60 ML/MIN
GFR NON-AFRICAN AMERICAN: >60 ML/MIN
GFR SERPL CREATININE-BSD FRML MDRD: NORMAL ML/MIN/{1.73_M2}
GFR SERPL CREATININE-BSD FRML MDRD: NORMAL ML/MIN/{1.73_M2}
GLOBULIN: ABNORMAL G/DL (ref 1.5–3.8)
GLUCOSE BLD-MCNC: 95 MG/DL (ref 70–99)
HCT VFR BLD CALC: 39.8 % (ref 40.7–50.3)
HEMOGLOBIN: 13.3 G/DL (ref 13–17)
IMMATURE GRANULOCYTES: 0 %
LIPASE: 27 U/L (ref 13–60)
LYMPHOCYTES # BLD: 32 % (ref 24–43)
MCH RBC QN AUTO: 29.5 PG (ref 25.2–33.5)
MCHC RBC AUTO-ENTMCNC: 33.4 G/DL (ref 28.4–34.8)
MCV RBC AUTO: 88.2 FL (ref 82.6–102.9)
MONOCYTES # BLD: 9 % (ref 3–12)
NRBC AUTOMATED: 0 PER 100 WBC
PDW BLD-RTO: 13.3 % (ref 11.8–14.4)
PLATELET # BLD: 266 K/UL (ref 138–453)
PLATELET ESTIMATE: ABNORMAL
PMV BLD AUTO: 10.4 FL (ref 8.1–13.5)
POC TROPONIN I: 0 NG/ML (ref 0–0.1)
POC TROPONIN I: 0.01 NG/ML (ref 0–0.1)
POC TROPONIN INTERP: NORMAL
POC TROPONIN INTERP: NORMAL
POTASSIUM SERPL-SCNC: 4.1 MMOL/L (ref 3.7–5.3)
RBC # BLD: 4.51 M/UL (ref 4.21–5.77)
RBC # BLD: ABNORMAL 10*6/UL
SEG NEUTROPHILS: 58 % (ref 36–65)
SEGMENTED NEUTROPHILS ABSOLUTE COUNT: 5.23 K/UL (ref 1.5–8.1)
SODIUM BLD-SCNC: 139 MMOL/L (ref 135–144)
TOTAL PROTEIN: 7.7 G/DL (ref 6.4–8.3)
WBC # BLD: 9 K/UL (ref 3.5–11.3)
WBC # BLD: ABNORMAL 10*3/UL

## 2018-08-19 PROCEDURE — 96376 TX/PRO/DX INJ SAME DRUG ADON: CPT

## 2018-08-19 PROCEDURE — 6370000000 HC RX 637 (ALT 250 FOR IP): Performed by: EMERGENCY MEDICINE

## 2018-08-19 PROCEDURE — 6360000002 HC RX W HCPCS: Performed by: EMERGENCY MEDICINE

## 2018-08-19 PROCEDURE — 83690 ASSAY OF LIPASE: CPT

## 2018-08-19 PROCEDURE — 74022 RADEX COMPL AQT ABD SERIES: CPT

## 2018-08-19 PROCEDURE — 80048 BASIC METABOLIC PNL TOTAL CA: CPT

## 2018-08-19 PROCEDURE — 85025 COMPLETE CBC W/AUTO DIFF WBC: CPT

## 2018-08-19 PROCEDURE — 2580000003 HC RX 258: Performed by: EMERGENCY MEDICINE

## 2018-08-19 PROCEDURE — 80076 HEPATIC FUNCTION PANEL: CPT

## 2018-08-19 PROCEDURE — 93005 ELECTROCARDIOGRAM TRACING: CPT

## 2018-08-19 PROCEDURE — 99285 EMERGENCY DEPT VISIT HI MDM: CPT

## 2018-08-19 PROCEDURE — 84484 ASSAY OF TROPONIN QUANT: CPT

## 2018-08-19 PROCEDURE — 96374 THER/PROPH/DIAG INJ IV PUSH: CPT

## 2018-08-19 RX ORDER — 0.9 % SODIUM CHLORIDE 0.9 %
1000 INTRAVENOUS SOLUTION INTRAVENOUS ONCE
Status: COMPLETED | OUTPATIENT
Start: 2018-08-19 | End: 2018-08-19

## 2018-08-19 RX ORDER — DIPHENHYDRAMINE HYDROCHLORIDE 50 MG/ML
25 INJECTION INTRAMUSCULAR; INTRAVENOUS ONCE
Status: COMPLETED | OUTPATIENT
Start: 2018-08-19 | End: 2018-08-19

## 2018-08-19 RX ORDER — ONDANSETRON 4 MG/1
4 TABLET, FILM COATED ORAL EVERY 8 HOURS PRN
Qty: 20 TABLET | Refills: 0 | Status: SHIPPED | OUTPATIENT
Start: 2018-08-19 | End: 2018-08-22

## 2018-08-19 RX ORDER — ONDANSETRON 4 MG/1
4 TABLET, FILM COATED ORAL ONCE
Status: COMPLETED | OUTPATIENT
Start: 2018-08-19 | End: 2018-08-19

## 2018-08-19 RX ORDER — PANTOPRAZOLE SODIUM 40 MG/1
40 TABLET, DELAYED RELEASE ORAL ONCE
Status: COMPLETED | OUTPATIENT
Start: 2018-08-19 | End: 2018-08-19

## 2018-08-19 RX ORDER — OXYCODONE HYDROCHLORIDE AND ACETAMINOPHEN 5; 325 MG/1; MG/1
2 TABLET ORAL ONCE
Status: COMPLETED | OUTPATIENT
Start: 2018-08-19 | End: 2018-08-19

## 2018-08-19 RX ADMIN — DIPHENHYDRAMINE HYDROCHLORIDE 25 MG: 50 INJECTION, SOLUTION INTRAMUSCULAR; INTRAVENOUS at 08:24

## 2018-08-19 RX ADMIN — PANTOPRAZOLE SODIUM 40 MG: 40 TABLET, DELAYED RELEASE ORAL at 08:18

## 2018-08-19 RX ADMIN — ONDANSETRON HYDROCHLORIDE 4 MG: 4 TABLET, FILM COATED ORAL at 08:18

## 2018-08-19 RX ADMIN — OXYCODONE HYDROCHLORIDE AND ACETAMINOPHEN 2 TABLET: 5; 325 TABLET ORAL at 08:18

## 2018-08-19 RX ADMIN — DIPHENHYDRAMINE HYDROCHLORIDE 25 MG: 50 INJECTION, SOLUTION INTRAMUSCULAR; INTRAVENOUS at 09:36

## 2018-08-19 RX ADMIN — SODIUM CHLORIDE 1000 ML: 9 INJECTION, SOLUTION INTRAVENOUS at 08:21

## 2018-08-19 ASSESSMENT — PAIN SCALES - GENERAL
PAINLEVEL_OUTOF10: 10
PAINLEVEL_OUTOF10: 10

## 2018-08-19 ASSESSMENT — PAIN DESCRIPTION - PAIN TYPE: TYPE: ACUTE PAIN

## 2018-08-19 ASSESSMENT — PAIN DESCRIPTION - LOCATION: LOCATION: ABDOMEN;CHEST

## 2018-08-19 NOTE — ED PROVIDER NOTES
[facility] ED     Emergency Department     Faculty Attestation        I performed a history and physical examination of the patient and discussed management with the resident. I reviewed the residents note and agree with the documented findings and plan of care. Any areas of disagreement are noted on the chart. I was personally present for the key portions of any procedures. I have documented in the chart those procedures where I was not present during the key portions. I have reviewed the emergency nurses triage note. I agree with the chief complaint, past medical history, past surgical history, allergies, medications, social and family history as documented unless otherwise noted below. For Physician Assistant/ Nurse Practitioner cases/documentation I have personally evaluated this patient and have completed at least one if not all key elements of the E/M (history, physical exam, and MDM). Additional findings are as noted. Vital Signs: BP: 131/78  Pulse: 91  Resp: 18  Temp: 97.7 °F (36.5 °C) SpO2: 96 %  PCP:  Aiden Cisneros MD    Pertinent Comments:         Critical Care  None      (Please note that portions of this note were completed with a voice recognition program. Efforts were made to edit the dictations but occasionally words are mis-transcribed.  Whenever words are used in this note in any gender, they shall be construed as though they were used in the gender appropriate to the circumstances; and whenever words are used in this note in the singular or plural form, they shall be construed as though they were used in the form appropriate to the circumstances.)    MD Bala Schroeder  Attending Emergency Medicine Physician              Horace Bryant MD  08/19/18 [id number]

## 2018-08-19 NOTE — ED PROVIDER NOTES
Other         Uncle    Other Brother         murdered Oct 6th, 2014    Colon Cancer Paternal Cousin 43    Other Sister         epilepsy    Migraines Sister        Allergies:  Geodon [ziprasidone hcl]; Haldol [haloperidol]; Iv dye [iodides]; Dicyclomine; and Famotidine    Home Medications:  Prior to Admission medications    Medication Sig Start Date End Date Taking? Authorizing Provider   ondansetron (ZOFRAN) 4 MG tablet Take 1 tablet by mouth every 8 hours as needed for Nausea 8/19/18  Yes Isabel Tobias MD   meloxicam (MOBIC) 7.5 MG tablet Take 1 tablet by mouth daily 8/14/18   Alison PINK MD   albuterol sulfate HFA (VENTOLIN HFA) 108 (90 Base) MCG/ACT inhaler Inhale 2 puffs into the lungs every 6 hours as needed for Wheezing 8/13/18   Oral PINK MD   Fluticasone Furoate-Vilanterol (BREO ELLIPTA) 200-25 MCG/INH AEPB Inhale 1 puff into the lungs daily 8/13/18   Oral PNIK MD   gabapentin (NEURONTIN) 300 MG capsule Take 1 capsule by mouth 3 times daily for 30 days. . 8/13/18 9/12/18  Darlene Khan MD   buPROPion (WELLBUTRIN) 100 MG tablet Take 1 tablet by mouth daily 8/14/18   Darlene Khan MD   DULoxetine (CYMBALTA) 30 MG extended release capsule Take 3 capsules by mouth daily 8/14/18   Darlene Khan MD   diphenhydrAMINE (BENADRYL) 25 MG tablet Take 1 tablet by mouth 2 times daily 8/13/18 9/12/18  Darlene Khan MD   promethazine (PHENERGAN) 12.5 MG tablet Take 1 tablet by mouth every 6 hours as needed for Nausea 8/13/18 8/20/18  Darlene Khan MD   ARIPiprazole (ABILIFY) 10 MG tablet Take 1 tablet by mouth daily 8/14/18   Darlene Khan MD   OLANZapine (ZYPREXA) 10 MG tablet Take 1 tablet by mouth nightly 8/13/18   Darlene Khan MD   QUEtiapine (SEROQUEL) 100 MG tablet Take 1 tablet by mouth nightly 8/13/18   Darlene Khan MD   doxycycline monohydrate (MONODOX) 100 MG capsule Take 1 capsule by mouth daily 8/14/18   Alison Olea Kelli PINK MD   pantoprazole (PROTONIX) 40 MG tablet Take 1 tablet by mouth daily 8/13/18   Javed Mock MD   sucralfate (CARAFATE) 1 GM tablet Take 1 tablet by mouth 4 times daily 8/13/18   Javed Mock MD   vitamin D (ERGOCALCIFEROL) 06872 units CAPS capsule Take 1 capsule by mouth once a week 8/13/18   Javed Mock MD   methadone 10 MG/5ML solution Take 94 mg by mouth every 4 hours as needed for Pain. Theodore Silva Historical Provider, MD       REVIEW OF SYSTEMS    (2-9 systems for level 4, 10 or more for level 5)      ROS:  Constitutional: Denied fever, weight loss  Eyes: Denied change in vision, eye pain  ENT: Denied sinus problems, congestion/obstruction  Respiratory: Positive shortness of breath, denied  chest pain upon inspiration  CV: Denied edema, chest pain, palpitations  GI: Denies nausea, vomiting, constipation, diarrhea, + change in stools and blood in stools  :Denied Change in urination, hematuria,   MS: Denied muscle stiffness, arthritis  Pscyh:Denies depression and hallucinations  Neuro: Denies weakness, headaches and seizures  Endocrine Denies polyphagia, polydipsia,   Hematological/lympathic: Denies lymphadenopathy, denied bleeds easily  Integumentary:Denies skin changes, rashes    PHYSICAL EXAM   (up to 7 for level 4, 8 or more for level 5)      INITIAL VITALS:   BP (!) 142/89   Pulse 76   Temp 97.7 °F (36.5 °C) (Oral)   Resp 13   Ht 5' 9\" (1.753 m)   Wt 240 lb (108.9 kg)   SpO2 93%   BMI 35.44 kg/m²     Vital signs reviewed.  Nursing note reviewed    Constitutional: Well developed; well-nourished   HENT: Normocephalic, atraumatic   Eyes: DEISY Conj nl  Neck: ROM nl Supple  Cardiovascular: Normal Rate, Regular Rhythm No murmurs, rubs, gallops  Pulmonary/Chest Wall: Effort normal limit, clear to ausculte bilaterally   Abdomen: Soft, non-tender, non-distended bowel sounds present no pulsatile mass there is some slight tenderness in the left lower quadrant guiac was negative no cva tenderness, cervantes's, rsvongings, obturator or psoas  Musculoskeletal: Normal ROM, no edema  Neurological: Alert and Oriented x3,   Skin: Warm and dry normal color  Psych: Mood/affect normal, behavior normal       DIFFERENTIAL  DIAGNOSIS     PLAN (LABS / IMAGING / EKG):  Orders Placed This Encounter   Procedures    XR Acute Abd Series Chest 1 VW    CBC Auto Differential    Basic Metabolic Panel    Hepatic Function Panel    Lipase    Telemetry monitoring    Orthostatic blood pressure and pulse    POCT troponin    POCT troponin    POCT troponin    EKG 12 Lead    Insert peripheral IV       MEDICATIONS ORDERED:  Orders Placed This Encounter   Medications    oxyCODONE-acetaminophen (PERCOCET) 5-325 MG per tablet 2 tablet    pantoprazole (PROTONIX) tablet 40 mg    ondansetron (ZOFRAN) tablet 4 mg    0.9 % sodium chloride bolus    diphenhydrAMINE (BENADRYL) injection 25 mg    diphenhydrAMINE (BENADRYL) injection 25 mg    ondansetron (ZOFRAN) 4 MG tablet     Sig: Take 1 tablet by mouth every 8 hours as needed for Nausea     Dispense:  20 tablet     Refill:  0       DDX: PUD, infectious, nsaid, stress, radiation, cmv, varices, malllory philippe tear, dieulafox lesion, avm, malignancy, aortaenetric fistula, beet ingestion, bisthmuths, diverticulosis, vascular ectasia, ischemic colitis, mesenteric ischemia, vasculitis, epitaxis,  vascular constriction, carcinommas, angiodyplasia, hemmorhoids, ibd, polyps, trauma, foreign body, delayed hemorrhage of polypectomy and endometriosis     DIAGNOSTIC RESULTS / EMERGENCY DEPARTMENT COURSE / MDM     LABS:  Results for orders placed or performed during the hospital encounter of 08/19/18   CBC Auto Differential   Result Value Ref Range    WBC 9.0 3.5 - 11.3 k/uL    RBC 4.51 4.21 - 5.77 m/uL    Hemoglobin 13.3 13.0 - 17.0 g/dL    Hematocrit 39.8 (L) 40.7 - 50.3 %    MCV 88.2 82.6 - 102.9 fL    MCH 29.5 25.2 - 33.5 pg    MCHC 33.4 28.4 - 34.8 g/dL    RDW 13.3 11.8 - 14.4 %    Platelets 495 355 - 134 k/uL    MPV 10.4 8.1 - 13.5 fL    NRBC Automated 0.0 0.0 per 100 WBC    Differential Type NOT REPORTED     Seg Neutrophils 58 36 - 65 %    Lymphocytes 32 24 - 43 %    Monocytes 9 3 - 12 %    Eosinophils % 1 1 - 4 %    Basophils 0 0 - 2 %    Immature Granulocytes 0 0 %    Segs Absolute 5.23 1.50 - 8.10 k/uL    Absolute Lymph # 2.84 1.10 - 3.70 k/uL    Absolute Mono # 0.78 0.10 - 1.20 k/uL    Absolute Eos # 0.07 0.00 - 0.44 k/uL    Basophils # <0.03 0.00 - 0.20 k/uL    Absolute Immature Granulocyte 0.03 0.00 - 0.30 k/uL    WBC Morphology NOT REPORTED     RBC Morphology NOT REPORTED     Platelet Estimate NOT REPORTED    Basic Metabolic Panel   Result Value Ref Range    Glucose 95 70 - 99 mg/dL    BUN 10 6 - 20 mg/dL    CREATININE 0.81 0.70 - 1.20 mg/dL    Bun/Cre Ratio NOT REPORTED 9 - 20    Calcium 8.7 8.6 - 10.4 mg/dL    Sodium 139 135 - 144 mmol/L    Potassium 4.1 3.7 - 5.3 mmol/L    Chloride 100 98 - 107 mmol/L    CO2 25 20 - 31 mmol/L    Anion Gap 14 9 - 17 mmol/L    GFR Non-African American >60 >60 mL/min    GFR African American >60 >60 mL/min    GFR Comment          GFR Staging NOT REPORTED    Hepatic Function Panel   Result Value Ref Range    Alb 4.2 3.5 - 5.2 g/dL    Alkaline Phosphatase 68 40 - 129 U/L    ALT 33 5 - 41 U/L    AST 32 <40 U/L    Total Bilirubin 0.21 (L) 0.3 - 1.2 mg/dL    Bilirubin, Direct <0.08 <0.31 mg/dL    Bilirubin, Indirect CANNOT BE CALCULATED 0.00 - 1.00 mg/dL    Total Protein 7.7 6.4 - 8.3 g/dL    Globulin NOT REPORTED 1.5 - 3.8 g/dL    Albumin/Globulin Ratio 1.2 1.0 - 2.5   Lipase   Result Value Ref Range    Lipase 27 13 - 60 U/L   POCT troponin   Result Value Ref Range    POC Troponin I 0.00 0.00 - 0.10 ng/mL    POC Troponin Interp       The Troponin-I (POC) results cannot be compared to the Troponin-T results.    POCT troponin   Result Value Ref Range    POC Troponin I 0.01 0.00 - 0.10 ng/mL    POC Troponin Interp       The Troponin-I (POC) results cannot

## 2018-08-19 NOTE — ED NOTES
Federica Pretty is a 32year old male with a history of PUD and ulcerative colitis who presents to the emergency department with chest pain, hematemesis, abdominal pain, nausea and vomiting, and diarrhea. Patient states that symptoms began a week ago, but over the past 3-4 days he has had 7-8 episodes of bright red hematemesis. On top of this, patient complains of bilateral lower quadrant abdominal pain that he rates 10/10. Patient also complains of diarrhea over this time period and states that he has noticed a darkening of his stools. Patient states that he has not been able to keep medical down for the past 3 days. ROS is pertinently positive for dizziness, headache, as well as fever and chills.

## 2018-08-22 ENCOUNTER — HOSPITAL ENCOUNTER (EMERGENCY)
Age: 28
Discharge: HOME | End: 2018-08-22
Attending: EMERGENCY MEDICINE
Payer: MEDICARE

## 2018-08-22 ENCOUNTER — APPOINTMENT (OUTPATIENT)
Dept: GENERAL RADIOLOGY | Age: 28
End: 2018-08-22
Payer: MEDICARE

## 2018-08-22 ENCOUNTER — APPOINTMENT (OUTPATIENT)
Dept: CT IMAGING | Age: 28
End: 2018-08-22
Payer: MEDICARE

## 2018-08-22 VITALS
HEART RATE: 72 BPM | WEIGHT: 200 LBS | TEMPERATURE: 98.7 F | SYSTOLIC BLOOD PRESSURE: 112 MMHG | DIASTOLIC BLOOD PRESSURE: 78 MMHG | RESPIRATION RATE: 12 BRPM | OXYGEN SATURATION: 100 % | BODY MASS INDEX: 29.53 KG/M2

## 2018-08-22 DIAGNOSIS — R07.9 CHEST PAIN, UNSPECIFIED TYPE: ICD-10-CM

## 2018-08-22 DIAGNOSIS — R10.9 CHRONIC ABDOMINAL PAIN: ICD-10-CM

## 2018-08-22 DIAGNOSIS — L29.9 PRURITUS: Primary | ICD-10-CM

## 2018-08-22 DIAGNOSIS — G89.29 CHRONIC ABDOMINAL PAIN: ICD-10-CM

## 2018-08-22 LAB
-: NORMAL
ABSOLUTE EOS #: <0.03 K/UL (ref 0–0.44)
ABSOLUTE IMMATURE GRANULOCYTE: <0.03 K/UL (ref 0–0.3)
ABSOLUTE LYMPH #: 2.15 K/UL (ref 1.1–3.7)
ABSOLUTE MONO #: 0.59 K/UL (ref 0.1–1.2)
ALBUMIN SERPL-MCNC: 4.7 G/DL (ref 3.5–5.2)
ALBUMIN/GLOBULIN RATIO: 1.2 (ref 1–2.5)
ALP BLD-CCNC: 79 U/L (ref 40–129)
ALT SERPL-CCNC: 42 U/L (ref 5–41)
AMORPHOUS: NORMAL
ANION GAP SERPL CALCULATED.3IONS-SCNC: 13 MMOL/L (ref 9–17)
AST SERPL-CCNC: 42 U/L
BACTERIA: NORMAL
BASOPHILS # BLD: 0 % (ref 0–2)
BASOPHILS ABSOLUTE: <0.03 K/UL (ref 0–0.2)
BILIRUB SERPL-MCNC: 0.4 MG/DL (ref 0.3–1.2)
BILIRUBIN DIRECT: 0.08 MG/DL
BILIRUBIN URINE: NEGATIVE
BILIRUBIN, INDIRECT: 0.32 MG/DL (ref 0–1)
BUN BLDV-MCNC: 11 MG/DL (ref 6–20)
BUN/CREAT BLD: ABNORMAL (ref 9–20)
CALCIUM SERPL-MCNC: 9.1 MG/DL (ref 8.6–10.4)
CASTS UA: NORMAL /LPF (ref 0–8)
CHLORIDE BLD-SCNC: 100 MMOL/L (ref 98–107)
CO2: 27 MMOL/L (ref 20–31)
COLOR: YELLOW
COMMENT UA: ABNORMAL
CREAT SERPL-MCNC: 0.74 MG/DL (ref 0.7–1.2)
CRYSTALS, UA: NORMAL /HPF
DIFFERENTIAL TYPE: ABNORMAL
EKG ATRIAL RATE: 80 BPM
EKG P AXIS: 54 DEGREES
EKG P-R INTERVAL: 136 MS
EKG Q-T INTERVAL: 384 MS
EKG QRS DURATION: 76 MS
EKG QTC CALCULATION (BAZETT): 442 MS
EKG R AXIS: 57 DEGREES
EKG T AXIS: 38 DEGREES
EKG VENTRICULAR RATE: 80 BPM
EOSINOPHILS RELATIVE PERCENT: 0 % (ref 1–4)
EPITHELIAL CELLS UA: NORMAL /HPF (ref 0–5)
GFR AFRICAN AMERICAN: >60 ML/MIN
GFR NON-AFRICAN AMERICAN: >60 ML/MIN
GFR SERPL CREATININE-BSD FRML MDRD: ABNORMAL ML/MIN/{1.73_M2}
GFR SERPL CREATININE-BSD FRML MDRD: ABNORMAL ML/MIN/{1.73_M2}
GLOBULIN: ABNORMAL G/DL (ref 1.5–3.8)
GLUCOSE BLD-MCNC: 100 MG/DL (ref 70–99)
GLUCOSE URINE: NEGATIVE
HCT VFR BLD CALC: 43 % (ref 40.7–50.3)
HEMOGLOBIN: 14.1 G/DL (ref 13–17)
IMMATURE GRANULOCYTES: 0 %
KETONES, URINE: ABNORMAL
LEUKOCYTE ESTERASE, URINE: NEGATIVE
LIPASE: 49 U/L (ref 13–60)
LYMPHOCYTES # BLD: 24 % (ref 24–43)
MCH RBC QN AUTO: 29.3 PG (ref 25.2–33.5)
MCHC RBC AUTO-ENTMCNC: 32.8 G/DL (ref 28.4–34.8)
MCV RBC AUTO: 89.2 FL (ref 82.6–102.9)
MONOCYTES # BLD: 7 % (ref 3–12)
MUCUS: NORMAL
NITRITE, URINE: NEGATIVE
NRBC AUTOMATED: 0 PER 100 WBC
OTHER OBSERVATIONS UA: NORMAL
PDW BLD-RTO: 13.3 % (ref 11.8–14.4)
PH UA: 6 (ref 5–8)
PLATELET # BLD: 277 K/UL (ref 138–453)
PLATELET ESTIMATE: ABNORMAL
PMV BLD AUTO: 9.8 FL (ref 8.1–13.5)
POTASSIUM SERPL-SCNC: 4 MMOL/L (ref 3.7–5.3)
PROTEIN UA: ABNORMAL
RBC # BLD: 4.82 M/UL (ref 4.21–5.77)
RBC # BLD: ABNORMAL 10*6/UL
RBC UA: NORMAL /HPF (ref 0–4)
RENAL EPITHELIAL, UA: NORMAL /HPF
SEG NEUTROPHILS: 69 % (ref 36–65)
SEGMENTED NEUTROPHILS ABSOLUTE COUNT: 6.24 K/UL (ref 1.5–8.1)
SODIUM BLD-SCNC: 140 MMOL/L (ref 135–144)
SPECIFIC GRAVITY UA: 1.03 (ref 1–1.03)
TOTAL PROTEIN: 8.5 G/DL (ref 6.4–8.3)
TRICHOMONAS: NORMAL
TROPONIN INTERP: NORMAL
TROPONIN T: <0.03 NG/ML
TURBIDITY: CLEAR
URINE HGB: NEGATIVE
UROBILINOGEN, URINE: NORMAL
WBC # BLD: 9 K/UL (ref 3.5–11.3)
WBC # BLD: ABNORMAL 10*3/UL
WBC UA: NORMAL /HPF (ref 0–5)
YEAST: NORMAL

## 2018-08-22 PROCEDURE — 71046 X-RAY EXAM CHEST 2 VIEWS: CPT

## 2018-08-22 PROCEDURE — 96374 THER/PROPH/DIAG INJ IV PUSH: CPT

## 2018-08-22 PROCEDURE — 80076 HEPATIC FUNCTION PANEL: CPT

## 2018-08-22 PROCEDURE — 93005 ELECTROCARDIOGRAM TRACING: CPT

## 2018-08-22 PROCEDURE — 83690 ASSAY OF LIPASE: CPT

## 2018-08-22 PROCEDURE — 87086 URINE CULTURE/COLONY COUNT: CPT

## 2018-08-22 PROCEDURE — 74176 CT ABD & PELVIS W/O CONTRAST: CPT

## 2018-08-22 PROCEDURE — 80048 BASIC METABOLIC PNL TOTAL CA: CPT

## 2018-08-22 PROCEDURE — 87591 N.GONORRHOEAE DNA AMP PROB: CPT

## 2018-08-22 PROCEDURE — 6370000000 HC RX 637 (ALT 250 FOR IP): Performed by: PHYSICIAN ASSISTANT

## 2018-08-22 PROCEDURE — 2580000003 HC RX 258: Performed by: PHYSICIAN ASSISTANT

## 2018-08-22 PROCEDURE — 6360000002 HC RX W HCPCS: Performed by: PHYSICIAN ASSISTANT

## 2018-08-22 PROCEDURE — 87505 NFCT AGENT DETECTION GI: CPT

## 2018-08-22 PROCEDURE — 99285 EMERGENCY DEPT VISIT HI MDM: CPT

## 2018-08-22 PROCEDURE — 85025 COMPLETE CBC W/AUTO DIFF WBC: CPT

## 2018-08-22 PROCEDURE — 81001 URINALYSIS AUTO W/SCOPE: CPT

## 2018-08-22 PROCEDURE — 87491 CHLMYD TRACH DNA AMP PROBE: CPT

## 2018-08-22 PROCEDURE — 84484 ASSAY OF TROPONIN QUANT: CPT

## 2018-08-22 PROCEDURE — 96372 THER/PROPH/DIAG INJ SC/IM: CPT

## 2018-08-22 RX ORDER — PANTOPRAZOLE SODIUM 20 MG/1
20 TABLET, DELAYED RELEASE ORAL DAILY
Qty: 7 TABLET | Refills: 0 | Status: SHIPPED | OUTPATIENT
Start: 2018-08-22 | End: 2018-09-17 | Stop reason: ALTCHOICE

## 2018-08-22 RX ORDER — DIPHENHYDRAMINE HCL 25 MG
25 TABLET ORAL ONCE
Status: DISCONTINUED | OUTPATIENT
Start: 2018-08-22 | End: 2018-08-22 | Stop reason: HOSPADM

## 2018-08-22 RX ORDER — ACETAMINOPHEN 500 MG
1000 TABLET ORAL ONCE
Status: COMPLETED | OUTPATIENT
Start: 2018-08-22 | End: 2018-08-22

## 2018-08-22 RX ORDER — DIPHENHYDRAMINE HYDROCHLORIDE 50 MG/ML
25 INJECTION INTRAMUSCULAR; INTRAVENOUS ONCE
Status: COMPLETED | OUTPATIENT
Start: 2018-08-22 | End: 2018-08-22

## 2018-08-22 RX ORDER — KETOROLAC TROMETHAMINE 30 MG/ML
30 INJECTION, SOLUTION INTRAMUSCULAR; INTRAVENOUS ONCE
Status: COMPLETED | OUTPATIENT
Start: 2018-08-22 | End: 2018-08-22

## 2018-08-22 RX ORDER — ACETAMINOPHEN 325 MG/1
650 TABLET ORAL EVERY 6 HOURS PRN
Qty: 30 TABLET | Refills: 0 | Status: ON HOLD | OUTPATIENT
Start: 2018-08-22 | End: 2018-10-01

## 2018-08-22 RX ORDER — 0.9 % SODIUM CHLORIDE 0.9 %
1000 INTRAVENOUS SOLUTION INTRAVENOUS ONCE
Status: COMPLETED | OUTPATIENT
Start: 2018-08-22 | End: 2018-08-22

## 2018-08-22 RX ORDER — MAGNESIUM HYDROXIDE/ALUMINUM HYDROXICE/SIMETHICONE 120; 1200; 1200 MG/30ML; MG/30ML; MG/30ML
30 SUSPENSION ORAL
Status: DISCONTINUED | OUTPATIENT
Start: 2018-08-22 | End: 2018-08-22 | Stop reason: HOSPADM

## 2018-08-22 RX ORDER — WATER / MINERAL OIL / WHITE PETROLATUM 16 OZ
CREAM TOPICAL
Qty: 1 PACKAGE | Refills: 0 | Status: ON HOLD | OUTPATIENT
Start: 2018-08-22 | End: 2019-01-14 | Stop reason: HOSPADM

## 2018-08-22 RX ORDER — ONDANSETRON 4 MG/1
4 TABLET, ORALLY DISINTEGRATING ORAL ONCE
Status: COMPLETED | OUTPATIENT
Start: 2018-08-22 | End: 2018-08-22

## 2018-08-22 RX ADMIN — ACETAMINOPHEN 1000 MG: 500 TABLET ORAL at 12:40

## 2018-08-22 RX ADMIN — KETOROLAC TROMETHAMINE 30 MG: 30 INJECTION, SOLUTION INTRAMUSCULAR at 14:35

## 2018-08-22 RX ADMIN — SODIUM CHLORIDE 1000 ML: 9 INJECTION, SOLUTION INTRAVENOUS at 12:48

## 2018-08-22 RX ADMIN — DIPHENHYDRAMINE HYDROCHLORIDE 25 MG: 50 INJECTION, SOLUTION INTRAMUSCULAR; INTRAVENOUS at 14:35

## 2018-08-22 RX ADMIN — ONDANSETRON 4 MG: 4 TABLET, ORALLY DISINTEGRATING ORAL at 12:40

## 2018-08-22 ASSESSMENT — ENCOUNTER SYMPTOMS
EYE DISCHARGE: 0
SHORTNESS OF BREATH: 0
COUGH: 0
EYE ITCHING: 0
WHEEZING: 0
VOMITING: 0
RHINORRHEA: 0
EYE PAIN: 0
DIARRHEA: 1
NAUSEA: 1
BACK PAIN: 0
ROS SKIN COMMENTS: +PRURITUS
SORE THROAT: 0
ABDOMINAL PAIN: 1

## 2018-08-22 ASSESSMENT — PAIN DESCRIPTION - PAIN TYPE: TYPE: CHRONIC PAIN

## 2018-08-22 ASSESSMENT — PAIN SCALES - GENERAL
PAINLEVEL_OUTOF10: 7
PAINLEVEL_OUTOF10: 10

## 2018-08-22 ASSESSMENT — PAIN DESCRIPTION - LOCATION: LOCATION: ABDOMEN

## 2018-08-22 ASSESSMENT — PAIN DESCRIPTION - ORIENTATION: ORIENTATION: LOWER;MID

## 2018-08-22 ASSESSMENT — PAIN SCALES - WONG BAKER: WONGBAKER_NUMERICALRESPONSE: 10

## 2018-08-22 ASSESSMENT — PAIN DESCRIPTION - FREQUENCY: FREQUENCY: CONTINUOUS

## 2018-08-22 ASSESSMENT — PAIN DESCRIPTION - DESCRIPTORS: DESCRIPTORS: BURNING;DULL

## 2018-08-22 NOTE — ED NOTES
Pt continue to be difficult, pt attempted to convince writer to administer IM injection medication into IV without an order change, pt updated that that is illegal and that would not happen.        Ricardo Ndiaye, RN  08/22/18 [id number]

## 2018-08-22 NOTE — ED PROVIDER NOTES
[address] [facility]     Emergency Department     Faculty Attestation    I performed a history and physical examination of the patient and discussed management with the resident. I reviewed the residents note and agree with the documented findings and plan of care. Any areas of disagreement are noted on the chart. I was personally present for the key portions of any procedures. I have documented in the chart those procedures where I was not present during the key portions. I have reviewed the emergency nurses triage note. I agree with the chief complaint, past medical history, past surgical history, allergies, medications, social and family history as documented unless otherwise noted below. Documentation of the HPI, Physical Exam and Medical Decision Making performed by medical students or scribes is based on my personal performance of the HPI, PE and MDM. For Physician Assistant/ Nurse Practitioner cases/documentation I have personally evaluated this patient and have completed at least one if not all key elements of the E/M (history, physical exam, and MDM). Additional findings are as noted. Vital signs:   Vitals:    08/22/18 1325   BP: 112/78   Pulse: 72   Resp: 12   Temp: 98.7 °F (37.1 °C)   SpO2: 8%      49-year-old male presents complaining of left flank into the left lower quadrant abdominal pain with nausea. From review of the electronic health record, it appears the patient has been making multiple visits the emergency department with the exact same complaint, and also requesting STD testing. However on physical exam he is quite dramatic. He appears to be very tender in the left lower quadrant left flank. We will check some basic labs possible CT abdomen.             Johanna Sewell M.D,  Attending Emergency  Physician           Johanna Sewell MD  08/22/18 [id number]

## 2018-08-22 NOTE — ED PROVIDER NOTES
[facility]  ED  Emergency Department Encounter  Mid Level Provider     Pt Name: Katie Lloyd  MRN: [id number]  [place] 1990  Date of evaluation: 8/22/18  PCP:  Tana Bustos MD    [address] [place] [address]       Chief Complaint   Patient presents with    Abdominal Pain     was just discharged for the same    Dysuria     \"my junk burning\"       HISTORY OF PRESENT ILLNESS  (Location/Symptom, Timing/Onset, Context/Setting, Quality, Duration, Modifying Factors, Severity.)      Katie Lloyd is a 32 y.o. male who presents with Diffuse abdominal pain, itching of his chest arms and back, chest pain. Patient initially told me that his abdominal pain started yesterday and denied that he had a history of previous abdominal pain but on further questioning does admit that he has a history of chronic abdominal pain and this is consistent with his usual abdominal pain. Patient does report some nausea with no vomiting. He does report chronic diarrhea for several months. He states that he has at least 5 episodes of diarrhea a day. Patient states that he does have a gastroenterologist and a rheumatologist but cannot recall the names of either of these physicians. Patient states that he does take some psychiatric medications daily but denies taking any other medications daily. He states that he has a history of gastric ulcer, colitis, diverticulitis, diverticulosis, cholecystitis. He has had his gallbladder removed    PAST MEDICAL / SURGICAL / SOCIAL / FAMILY HISTORY      has a past medical history of Anxiety; Arthritis; Asthma; Bipolar I disorder, most recent episode (or current) depressed, unspecified; Clostridium difficile infection; COPD (chronic obstructive pulmonary disease) ([facility] [address] Depression; Disease of blood and blood forming organ; Eczema; Fracture, metacarpal; Gastric ulcer; Gastritis; GERD (gastroesophageal reflux disease); GI bleed; H. pylori infection; H/O blood clots;  Head injury; epilepsy    Migraines Brother     Stroke Other         Uncle    Other Brother         murdered Oct 6th, 2014    Colon Cancer Paternal Cousin 43    Other Sister         epilepsy    Migraines Sister        Allergies:  Geodon [ziprasidone hcl]; Haldol [haloperidol]; Iv dye [iodides]; Bentyl [dicyclomine hcl]; Dicyclomine; Famotidine; and Flagyl [metronidazole]    Home Medications:  Prior to Admission medications    Medication Sig Start Date End Date Taking? Authorizing Provider   Skin Protectants, Misc. (EUCERIN) cream Apply topically as needed. 8/22/18  Yes Thea Gabriel PA-C   acetaminophen (TYLENOL) 325 MG tablet Take 2 tablets by mouth every 6 hours as needed for Pain 8/22/18  Yes Thea Gabriel PA-C   pantoprazole (PROTONIX) 20 MG tablet Take 1 tablet by mouth daily for 7 days 8/22/18 8/29/18 Yes Thea Gabriel PA-C   meloxicam (MOBIC) 7.5 MG tablet Take 1 tablet by mouth daily 8/14/18   Veronica PINK MD   albuterol sulfate HFA (VENTOLIN HFA) 108 (90 Base) MCG/ACT inhaler Inhale 2 puffs into the lungs every 6 hours as needed for Wheezing 8/13/18   Oral PINK MD   Fluticasone Furoate-Vilanterol (BREO ELLIPTA) 200-25 MCG/INH AEPB Inhale 1 puff into the lungs daily 8/13/18   Oral PINK MD   gabapentin (NEURONTIN) 300 MG capsule Take 1 capsule by mouth 3 times daily for 30 days. . 8/13/18 9/12/18  Randee Stark MD   buPROPion (WELLBUTRIN) 100 MG tablet Take 1 tablet by mouth daily 8/14/18   Randee Stark MD   DULoxetine (CYMBALTA) 30 MG extended release capsule Take 3 capsules by mouth daily 8/14/18   Randee Stark MD   diphenhydrAMINE (BENADRYL) 25 MG tablet Take 1 tablet by mouth 2 times daily 8/13/18 9/12/18  Randee Stark MD   ARIPiprazole (ABILIFY) 10 MG tablet Take 1 tablet by mouth daily 8/14/18   Randee Stark MD   OLANZapine (ZYPREXA) 10 MG tablet Take 1 tablet by mouth nightly 8/13/18   Randee Stark MD   QUEtiapine (SEROQUEL) 100 eye exhibits no discharge. No scleral icterus. Neck: Normal range of motion. No tracheal deviation present. Cardiovascular: Normal rate, regular rhythm and normal heart sounds. Exam reveals no gallop. No murmur heard. Pulmonary/Chest: Effort normal and breath sounds normal. No stridor. No respiratory distress. Abdominal: Soft. There is generalized tenderness. There is no rebound and no guarding. Genitourinary: Right testis shows no mass and no tenderness. Left testis shows no mass and no tenderness. No discharge found. Musculoskeletal: Normal range of motion. He exhibits no edema. Neurological: He is alert and oriented to person, place, and time. Coordination normal.   Skin: Skin is warm and dry. No rash (on exposed surfaces) noted. He is not diaphoretic. No pallor. Psychiatric: He has a normal mood and affect.  His behavior is normal.       DIFFERENTIAL  DIAGNOSIS       GERD, PUD, pancreatitis, cholecystitis, GB colic, cholangitis, Cuvk-Ssia-Zlasri, ACS/ MI, pneumonia, SBO, DKA, AAA, mesenteric ischemia, perforated viscous, acute gastroenteritis, NSAP, pyelonephritis, kidney stone, appendicitis, hernia, D-TICS, UTI, constipation, testicular torsion, epididymitis/ orchitis      PLAN (LABS / IMAGING / EKG):  Orders Placed This Encounter   Procedures    Urine Culture    C.trachomatis N.gonorrhoeae DNA, Urine    C Diff Toxin by RT PCR -  [facility], [facility], [facility], [place] - ONLY    Culture Stool    Giardia antigen    XR CHEST STANDARD (2 VW)    CT ABDOMEN PELVIS WO CONTRAST    CBC Auto Differential    Basic Metabolic Panel    Urinalysis    LIPASE    Hepatic Function Panel    Blood Occult Stool Screen #1    Giardia / Cryptosporidum antigens, DFA    Troponin    Microscopic Urinalysis    Vital signs    Inpatient consult to Social Work    EKG 12 Lead    Insert peripheral IV       MEDICATIONS ORDERED:  Orders Placed This Encounter   Medications    acetaminophen (TYLENOL) tablet 1,000 mg    0.9 % sodium chloride bolus    ondansetron (ZOFRAN-ODT) disintegrating tablet 4 mg    diphenhydrAMINE (BENADRYL) tablet 25 mg    diphenhydrAMINE (BENADRYL) injection 25 mg    aluminum & magnesium hydroxide-simethicone (MAALOX) 200-200-20 MG/5ML suspension 30 mL    ketorolac (TORADOL) injection 30 mg    Skin Protectants, Misc. (EUCERIN) cream     Sig: Apply topically as needed. Dispense:  1 Package     Refill:  0    acetaminophen (TYLENOL) 325 MG tablet     Sig: Take 2 tablets by mouth every 6 hours as needed for Pain     Dispense:  30 tablet     Refill:  0    pantoprazole (PROTONIX) 20 MG tablet     Sig: Take 1 tablet by mouth daily for 7 days     Dispense:  7 tablet     Refill:  0       Controlled Substances Monitoring:      DIAGNOSTIC RESULTS / EMERGENCY DEPARTMENT COURSE / MDM   Patient without any episodes of diarrhea while in the emergency department. He has afebrile. A CT scan was performed with no acute findings. Patient cannot recall the name of his gastroenterologist nor his rheumatologist.  Patient is provided with a GERD medication, recommended close follow-up with his gastroenterologist.  He does report that he has the name at home. Patient with chest pain however no changes to the ECG. Chest pain did resolve while he was in the emergency department. He denies a history of cardiac events, no family history of early or sudden cardiac death. ED Course as of Aug 22 1701   Wed Aug 22, 2018   1340 1:40 PM  Called PCP to set up an appt with Dr. Mag Sultana    Patient has been dismissed due to compliance issues  [BULL]   1350 1:50 PM  Patient did allow a genital exam.  No swelling to the testicles. He has tenderness over the just inferior to the suprapubic bone. Patient also complaining of itching to his back and arms. He was offered by mouth Benadryl which she declined, offered lotion which she declines.   Patient also complaining of abdominal pain and was offered oral medications such as Bentyl which he declines and offered Motrin which he declines. [BULL]   1401 2:02 PM  Called into the patient's room again. Patient is again saying that he cannot take the by mouth Benadryl. Patient requesting [place] sort of pain medication\". I offered him any nonnarcotic medication that he would like of his choice. Patient is only requesting IV Benadryl which she states helps with his abdominal pain. I will give him a 1 time IM dose of Benadryl. He has also been offered a Pepcid which she declines, Protonix which she declines  [BULL]   1408 2:08 PM  Called back into the patient's room again. Patient is stating that now he can take NSAIDs even though he has had previous stomach ulcers and that he is currently taking mode back. He would also like the Maalox that was previously offered to him. He is also requesting Toradol. [BULL]   4730 4:37 PM  Discussed results with patient. The patient states that he got the information from the  in order to establish care with a new family doctor. [BULL]      ED Course User Index  [BULL] Wilmer Dunlap PA-C       RADIOLOGY:   I directly visualized (with the attending physician) the following  images and reviewed the radiologist interpretations:  Xr Acute Abd Series Chest 1 Vw    Result Date: 8/19/2018  EXAMINATION: TWO XRAY VIEWS OF THE ABDOMEN AND SINGLE  XRAY VIEW OF THE CHEST 8/19/2018 8:54 am COMPARISON: Acute abdominal series from 06/15/2018 HISTORY: ORDERING SYSTEM PROVIDED HISTORY: chest pain and lower abdominal pain TECHNOLOGIST PROVIDED HISTORY: Reason for exam:->chest pain and lower abdominal pain FINDINGS: There is no focal airspace consolidation, pleural effusion or pneumothorax. The cardiomediastinal silhouette appears within normal limits, given technique and there is no pulmonary vascular congestion. There is a moderate stool burden in the colon. Air and stool is seen to the level of the distal colon.   No air-filled dilated loops of small bowel identified. There are no differential air-fluid levels. No free intraperitoneal air. No abnormal calcifications project over the abdomen. Visualized osseous structures appear intact, and grossly unremarkable, given the non dedicated imaging. 1. No radiographic evidence for acute cardiopulmonary disease process. 2. Nonobstructive bowel gas pattern. 3. No free intraperitoneal air. CT ABDOMEN PELVIS WO CONTRAST   Final Result   1. No acute intra-abdominal process. 2. Diffuse hepatic steatosis. XR CHEST STANDARD (2 VW)   Final Result   No acute cardiopulmonary disease.              LABS:  Results for orders placed or performed during the hospital encounter of 08/22/18   CBC Auto Differential   Result Value Ref Range    WBC 9.0 3.5 - 11.3 k/uL    RBC 4.82 4.21 - 5.77 m/uL    Hemoglobin 14.1 13.0 - 17.0 g/dL    Hematocrit 43.0 40.7 - 50.3 %    MCV 89.2 82.6 - 102.9 fL    MCH 29.3 25.2 - 33.5 pg    MCHC 32.8 28.4 - 34.8 g/dL    RDW 13.3 11.8 - 14.4 %    Platelets 407 459 - 222 k/uL    MPV 9.8 8.1 - 13.5 fL    NRBC Automated 0.0 0.0 per 100 WBC    Differential Type NOT REPORTED     Seg Neutrophils 69 (H) 36 - 65 %    Lymphocytes 24 24 - 43 %    Monocytes 7 3 - 12 %    Eosinophils % 0 (L) 1 - 4 %    Basophils 0 0 - 2 %    Immature Granulocytes 0 0 %    Segs Absolute 6.24 1.50 - 8.10 k/uL    Absolute Lymph # 2.15 1.10 - 3.70 k/uL    Absolute Mono # 0.59 0.10 - 1.20 k/uL    Absolute Eos # <0.03 0.00 - 0.44 k/uL    Basophils # <0.03 0.00 - 0.20 k/uL    Absolute Immature Granulocyte <0.03 0.00 - 0.30 k/uL    WBC Morphology NOT REPORTED     RBC Morphology NOT REPORTED     Platelet Estimate NOT REPORTED    Basic Metabolic Panel   Result Value Ref Range    Glucose 100 (H) 70 - 99 mg/dL    BUN 11 6 - 20 mg/dL    CREATININE 0.74 0.70 - 1.20 mg/dL    Bun/Cre Ratio NOT REPORTED 9 - 20    Calcium 9.1 8.6 - 10.4 mg/dL    Sodium 140 135 - 144 mmol/L    Potassium 4.0 3.7 - 5.3 mmol/L    Chloride 100 98 - 107 mmol/L    CO2 27 20 - 31 mmol/L    Anion Gap 13 9 - 17 mmol/L    GFR Non-African American >60 >60 mL/min    GFR African American >60 >60 mL/min    GFR Comment          GFR Staging NOT REPORTED    Urinalysis   Result Value Ref Range    Color, UA YELLOW YEL    Turbidity UA CLEAR CLEAR    Glucose, Ur NEGATIVE NEG    Bilirubin Urine NEGATIVE NEG    Ketones, Urine TRACE (A) NEG    Specific Gravity, UA 1.034 (H) 1.005 - 1.030    Urine Hgb NEGATIVE NEG    pH, UA 6.0 5.0 - 8.0    Protein, UA TRACE (A) NEG    Urobilinogen, Urine Normal NORM    Nitrite, Urine NEGATIVE NEG    Leukocyte Esterase, Urine NEGATIVE NEG    Urinalysis Comments NOT REPORTED    LIPASE   Result Value Ref Range    Lipase 49 13 - 60 U/L   Hepatic Function Panel   Result Value Ref Range    Alb 4.7 3.5 - 5.2 g/dL    Alkaline Phosphatase 79 40 - 129 U/L    ALT 42 (H) 5 - 41 U/L    AST 42 (H) <40 U/L    Total Bilirubin 0.40 0.3 - 1.2 mg/dL    Bilirubin, Direct 0.08 <0.31 mg/dL    Bilirubin, Indirect 0.32 0.00 - 1.00 mg/dL    Total Protein 8.5 (H) 6.4 - 8.3 g/dL    Globulin NOT REPORTED 1.5 - 3.8 g/dL    Albumin/Globulin Ratio 1.2 1.0 - 2.5   Troponin   Result Value Ref Range    Troponin T <0.03 <0.03 ng/mL    Troponin Interp         Microscopic Urinalysis   Result Value Ref Range    -          WBC, UA 2 TO 5 0 - 5 /HPF    RBC, UA 0 TO 2 0 - 4 /HPF    Casts UA 5 TO 10 HYALINE 0 - 8 /LPF    Crystals UA NOT REPORTED NONE /HPF    Epithelial Cells UA 2 TO 5 0 - 5 /HPF    Renal Epithelial, Urine NOT REPORTED 0 /HPF    Bacteria, UA NOT REPORTED NONE    Mucus, UA NOT REPORTED NONE    Trichomonas, UA NOT REPORTED NONE    Amorphous, UA NOT REPORTED NONE    Other Observations UA NOT REPORTED NREQ    Yeast, UA NOT REPORTED NONE   EKG 12 Lead   Result Value Ref Range    Ventricular Rate 80 BPM    Atrial Rate 80 BPM    P-R Interval 136 ms    QRS Duration 76 ms    Q-T Interval 384 ms    QTc Calculation (Bazett) 442 ms    P Axis 54 degrees    R Axis 57 degrees    T Axis 38

## 2018-08-23 LAB
C. TRACHOMATIS DNA ,URINE: NEGATIVE
CULTURE: NO GROWTH
Lab: NORMAL
N. GONORRHOEAE DNA, URINE: NEGATIVE
SPECIMEN DESCRIPTION: NORMAL
STATUS: NORMAL

## 2018-09-02 ENCOUNTER — APPOINTMENT (OUTPATIENT)
Dept: CT IMAGING | Age: 28
End: 2018-09-02
Payer: MEDICARE

## 2018-09-02 ENCOUNTER — HOSPITAL ENCOUNTER (EMERGENCY)
Age: 28
Discharge: LEFT BEFORE DISCHARGE | End: 2018-09-02
Attending: EMERGENCY MEDICINE
Payer: MEDICARE

## 2018-09-02 ENCOUNTER — APPOINTMENT (OUTPATIENT)
Dept: GENERAL RADIOLOGY | Age: 28
End: 2018-09-02
Payer: MEDICARE

## 2018-09-02 VITALS
DIASTOLIC BLOOD PRESSURE: 83 MMHG | OXYGEN SATURATION: 95 % | BODY MASS INDEX: 32.58 KG/M2 | RESPIRATION RATE: 20 BRPM | SYSTOLIC BLOOD PRESSURE: 148 MMHG | WEIGHT: 220 LBS | HEIGHT: 69 IN | TEMPERATURE: 96.8 F | HEART RATE: 100 BPM
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
## 2018-09-02 LAB
ABSOLUTE EOS #: <0.03 K/UL (ref 0–0.44)
ABSOLUTE IMMATURE GRANULOCYTE: 0.05 K/UL (ref 0–0.3)
ABSOLUTE LYMPH #: 2.54 K/UL (ref 1.1–3.7)
ABSOLUTE MONO #: 0.84 K/UL (ref 0.1–1.2)
ALBUMIN SERPL-MCNC: 4.3 G/DL (ref 3.5–5.2)
ALBUMIN/GLOBULIN RATIO: 1.3 (ref 1–2.5)
ALP BLD-CCNC: 65 U/L (ref 40–129)
ALT SERPL-CCNC: 26 U/L (ref 5–41)
ANION GAP SERPL CALCULATED.3IONS-SCNC: 12 MMOL/L (ref 9–17)
AST SERPL-CCNC: 33 U/L
BASOPHILS # BLD: 0 % (ref 0–2)
BASOPHILS ABSOLUTE: <0.03 K/UL (ref 0–0.2)
BILIRUB SERPL-MCNC: 0.32 MG/DL (ref 0.3–1.2)
BILIRUBIN DIRECT: 0.09 MG/DL
BILIRUBIN, INDIRECT: 0.23 MG/DL (ref 0–1)
BUN BLDV-MCNC: 6 MG/DL (ref 6–20)
BUN/CREAT BLD: ABNORMAL (ref 9–20)
CALCIUM SERPL-MCNC: 8.9 MG/DL (ref 8.6–10.4)
CHLORIDE BLD-SCNC: 101 MMOL/L (ref 98–107)
CO2: 25 MMOL/L (ref 20–31)
CREAT SERPL-MCNC: 0.91 MG/DL (ref 0.7–1.2)
DIFFERENTIAL TYPE: ABNORMAL
EOSINOPHILS RELATIVE PERCENT: 0 % (ref 1–4)
GFR AFRICAN AMERICAN: >60 ML/MIN
GFR NON-AFRICAN AMERICAN: >60 ML/MIN
GFR SERPL CREATININE-BSD FRML MDRD: ABNORMAL ML/MIN/{1.73_M2}
GFR SERPL CREATININE-BSD FRML MDRD: ABNORMAL ML/MIN/{1.73_M2}
GLOBULIN: NORMAL G/DL (ref 1.5–3.8)
GLUCOSE BLD-MCNC: 118 MG/DL (ref 70–99)
HCT VFR BLD CALC: 39.8 % (ref 40.7–50.3)
HEMOGLOBIN: 13.1 G/DL (ref 13–17)
IMMATURE GRANULOCYTES: 0 %
LIPASE: 29 U/L (ref 13–60)
LYMPHOCYTES # BLD: 21 % (ref 24–43)
MCH RBC QN AUTO: 29.8 PG (ref 25.2–33.5)
MCHC RBC AUTO-ENTMCNC: 32.9 G/DL (ref 28.4–34.8)
MCV RBC AUTO: 90.5 FL (ref 82.6–102.9)
MONOCYTES # BLD: 7 % (ref 3–12)
NRBC AUTOMATED: 0 PER 100 WBC
PDW BLD-RTO: 14 % (ref 11.8–14.4)
PLATELET # BLD: 282 K/UL (ref 138–453)
PLATELET ESTIMATE: ABNORMAL
PMV BLD AUTO: 9.9 FL (ref 8.1–13.5)
POTASSIUM SERPL-SCNC: 3.5 MMOL/L (ref 3.7–5.3)
RBC # BLD: 4.4 M/UL (ref 4.21–5.77)
RBC # BLD: ABNORMAL 10*6/UL
SEG NEUTROPHILS: 72 % (ref 36–65)
SEGMENTED NEUTROPHILS ABSOLUTE COUNT: 8.78 K/UL (ref 1.5–8.1)
SODIUM BLD-SCNC: 138 MMOL/L (ref 135–144)
TOTAL PROTEIN: 7.5 G/DL (ref 6.4–8.3)
WBC # BLD: 12.2 K/UL (ref 3.5–11.3)
WBC # BLD: ABNORMAL 10*3/UL

## 2018-09-02 PROCEDURE — 6360000002 HC RX W HCPCS: Performed by: STUDENT IN AN ORGANIZED HEALTH CARE EDUCATION/TRAINING PROGRAM

## 2018-09-02 PROCEDURE — 2580000003 HC RX 258: Performed by: STUDENT IN AN ORGANIZED HEALTH CARE EDUCATION/TRAINING PROGRAM

## 2018-09-02 PROCEDURE — 85025 COMPLETE CBC W/AUTO DIFF WBC: CPT

## 2018-09-02 PROCEDURE — 80076 HEPATIC FUNCTION PANEL: CPT

## 2018-09-02 PROCEDURE — 80048 BASIC METABOLIC PNL TOTAL CA: CPT

## 2018-09-02 PROCEDURE — 96374 THER/PROPH/DIAG INJ IV PUSH: CPT

## 2018-09-02 PROCEDURE — 83690 ASSAY OF LIPASE: CPT

## 2018-09-02 PROCEDURE — 99284 EMERGENCY DEPT VISIT MOD MDM: CPT

## 2018-09-02 RX ORDER — ONDANSETRON 4 MG/1
4 TABLET, FILM COATED ORAL ONCE
Status: DISCONTINUED | OUTPATIENT
Start: 2018-09-02 | End: 2018-09-02 | Stop reason: HOSPADM

## 2018-09-02 RX ORDER — 0.9 % SODIUM CHLORIDE 0.9 %
1000 INTRAVENOUS SOLUTION INTRAVENOUS ONCE
Status: COMPLETED | OUTPATIENT
Start: 2018-09-02 | End: 2018-09-02

## 2018-09-02 RX ORDER — KETOROLAC TROMETHAMINE 30 MG/ML
15 INJECTION, SOLUTION INTRAMUSCULAR; INTRAVENOUS ONCE
Status: COMPLETED | OUTPATIENT
Start: 2018-09-02 | End: 2018-09-02

## 2018-09-02 RX ORDER — DIPHENHYDRAMINE HCL 12.5MG/5ML
25 LIQUID (ML) ORAL ONCE
Status: DISCONTINUED | OUTPATIENT
Start: 2018-09-02 | End: 2018-09-02 | Stop reason: HOSPADM

## 2018-09-02 RX ORDER — DIPHENHYDRAMINE HCL 25 MG
25 TABLET ORAL ONCE
Status: DISCONTINUED | OUTPATIENT
Start: 2018-09-02 | End: 2018-09-02 | Stop reason: HOSPADM

## 2018-09-02 RX ADMIN — SODIUM CHLORIDE 1000 ML: 9 INJECTION, SOLUTION INTRAVENOUS at 11:27

## 2018-09-02 RX ADMIN — KETOROLAC TROMETHAMINE 15 MG: 30 INJECTION, SOLUTION INTRAMUSCULAR at 11:27

## 2018-09-02 ASSESSMENT — ENCOUNTER SYMPTOMS
NAUSEA: 1
SORE THROAT: 1
ABDOMINAL DISTENTION: 0
WHEEZING: 0
SHORTNESS OF BREATH: 0
BLOOD IN STOOL: 0
ABDOMINAL PAIN: 1
CHOKING: 0
VOMITING: 1
BACK PAIN: 1

## 2018-09-02 ASSESSMENT — PAIN SCALES - GENERAL
PAINLEVEL_OUTOF10: 10
PAINLEVEL_OUTOF10: 10

## 2018-09-02 ASSESSMENT — PAIN DESCRIPTION - PAIN TYPE: TYPE: ACUTE PAIN
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
## 2018-09-02 NOTE — ED NOTES
Dr. Leticia Kendall at bedside     Edward Huynh, [facility]  09/02/18 2507

## 2018-09-02 NOTE — ED NOTES
Pt refusing to go to ct scan due to being nausea and itching. Pt rates pain 10/10. Resident called to bedside.       Martha Kang RN  09/02/18 2299

## 2018-09-02 NOTE — ED NOTES
Pt refusing oral medications and x ray at this time. Pt wants pain medications that he does not have to swallow due to his stomach hurting, pt states he is in to much to go to x ray. Dr. Denise Deal notified. X ray will come back later.       Don Franco RN  09/02/18 9809

## 2018-09-02 NOTE — ED NOTES
Dr. Sergio Tan and writer to bedside. Pt putting hand over face when attempting to be talked to. Pt asked by Dr. Sergio Tan multiple times if he will go to his scans. Pt says \"you haven't given me anything for pain\" Pt reminded that he has refused all medications and medical interventions. CT had attempted 3 times to take him.  Decision to call security for escort out     Heber Benders, [place]  09/02/18 1855

## 2018-09-02 NOTE — ED NOTES
CT arrives to bedside to transport pt. Pt refusing ct scan, pt states his body hurts. Dr. Venancio Saldivar notified.       Chau Sandoval RN  09/02/18 1300

## 2018-09-10 ENCOUNTER — HOSPITAL ENCOUNTER (EMERGENCY)
Age: 28
Discharge: HOME | End: 2018-09-10
Attending: EMERGENCY MEDICINE
Payer: MEDICARE

## 2018-09-10 VITALS
DIASTOLIC BLOOD PRESSURE: 70 MMHG | RESPIRATION RATE: 16 BRPM | BODY MASS INDEX: 33.97 KG/M2 | OXYGEN SATURATION: 99 % | WEIGHT: 230 LBS | TEMPERATURE: 97.2 F | SYSTOLIC BLOOD PRESSURE: 130 MMHG | HEART RATE: 76 BPM

## 2018-09-10 DIAGNOSIS — R52 BODY ACHES: Primary | ICD-10-CM

## 2018-09-10 LAB
ABSOLUTE EOS #: <0.03 K/UL (ref 0–0.44)
ABSOLUTE IMMATURE GRANULOCYTE: 0.05 K/UL (ref 0–0.3)
ABSOLUTE LYMPH #: 1.04 K/UL (ref 1.1–3.7)
ABSOLUTE MONO #: 0.58 K/UL (ref 0.1–1.2)
ALBUMIN SERPL-MCNC: 4.2 G/DL (ref 3.5–5.2)
ALBUMIN/GLOBULIN RATIO: 1.2 (ref 1–2.5)
ALP BLD-CCNC: 69 U/L (ref 40–129)
ALT SERPL-CCNC: 26 U/L (ref 5–41)
ANION GAP SERPL CALCULATED.3IONS-SCNC: 14 MMOL/L (ref 9–17)
AST SERPL-CCNC: 32 U/L
BASOPHILS # BLD: 0 % (ref 0–2)
BASOPHILS ABSOLUTE: <0.03 K/UL (ref 0–0.2)
BILIRUB SERPL-MCNC: 0.22 MG/DL (ref 0.3–1.2)
BUN BLDV-MCNC: 8 MG/DL (ref 6–20)
BUN/CREAT BLD: ABNORMAL (ref 9–20)
CALCIUM SERPL-MCNC: 9.5 MG/DL (ref 8.6–10.4)
CHLORIDE BLD-SCNC: 103 MMOL/L (ref 98–107)
CO2: 24 MMOL/L (ref 20–31)
CREAT SERPL-MCNC: 0.67 MG/DL (ref 0.7–1.2)
DIFFERENTIAL TYPE: ABNORMAL
EOSINOPHILS RELATIVE PERCENT: 0 % (ref 1–4)
GFR AFRICAN AMERICAN: >60 ML/MIN
GFR NON-AFRICAN AMERICAN: >60 ML/MIN
GFR SERPL CREATININE-BSD FRML MDRD: ABNORMAL ML/MIN/{1.73_M2}
GFR SERPL CREATININE-BSD FRML MDRD: ABNORMAL ML/MIN/{1.73_M2}
GLUCOSE BLD-MCNC: 152 MG/DL (ref 70–99)
HCT VFR BLD CALC: 40.2 % (ref 40.7–50.3)
HEMOGLOBIN: 13.4 G/DL (ref 13–17)
IMMATURE GRANULOCYTES: 0 %
LIPASE: 20 U/L (ref 13–60)
LYMPHOCYTES # BLD: 7 % (ref 24–43)
MCH RBC QN AUTO: 30.1 PG (ref 25.2–33.5)
MCHC RBC AUTO-ENTMCNC: 33.3 G/DL (ref 28.4–34.8)
MCV RBC AUTO: 90.3 FL (ref 82.6–102.9)
MONOCYTES # BLD: 4 % (ref 3–12)
MYOGLOBIN: <21 NG/ML (ref 28–72)
NRBC AUTOMATED: 0 PER 100 WBC
PDW BLD-RTO: 13.4 % (ref 11.8–14.4)
PLATELET # BLD: 290 K/UL (ref 138–453)
PLATELET ESTIMATE: ABNORMAL
PMV BLD AUTO: 9.9 FL (ref 8.1–13.5)
POTASSIUM SERPL-SCNC: 4.4 MMOL/L (ref 3.7–5.3)
RBC # BLD: 4.45 M/UL (ref 4.21–5.77)
RBC # BLD: ABNORMAL 10*6/UL
SEG NEUTROPHILS: 88 % (ref 36–65)
SEGMENTED NEUTROPHILS ABSOLUTE COUNT: 12.34 K/UL (ref 1.5–8.1)
SODIUM BLD-SCNC: 141 MMOL/L (ref 135–144)
TOTAL PROTEIN: 7.7 G/DL (ref 6.4–8.3)
WBC # BLD: 14 K/UL (ref 3.5–11.3)
WBC # BLD: ABNORMAL 10*3/UL

## 2018-09-10 PROCEDURE — G0382 LEV 3 HOSP TYPE B ED VISIT: HCPCS

## 2018-09-10 PROCEDURE — 83690 ASSAY OF LIPASE: CPT

## 2018-09-10 PROCEDURE — 96374 THER/PROPH/DIAG INJ IV PUSH: CPT

## 2018-09-10 PROCEDURE — 85025 COMPLETE CBC W/AUTO DIFF WBC: CPT

## 2018-09-10 PROCEDURE — 80053 COMPREHEN METABOLIC PANEL: CPT

## 2018-09-10 PROCEDURE — 2580000003 HC RX 258: Performed by: STUDENT IN AN ORGANIZED HEALTH CARE EDUCATION/TRAINING PROGRAM

## 2018-09-10 PROCEDURE — 6360000002 HC RX W HCPCS: Performed by: STUDENT IN AN ORGANIZED HEALTH CARE EDUCATION/TRAINING PROGRAM

## 2018-09-10 PROCEDURE — 83874 ASSAY OF MYOGLOBIN: CPT

## 2018-09-10 RX ORDER — KETOROLAC TROMETHAMINE 15 MG/ML
15 INJECTION, SOLUTION INTRAMUSCULAR; INTRAVENOUS ONCE
Status: COMPLETED | OUTPATIENT
Start: 2018-09-10 | End: 2018-09-10

## 2018-09-10 RX ORDER — SODIUM CHLORIDE, SODIUM LACTATE, POTASSIUM CHLORIDE, AND CALCIUM CHLORIDE .6; .31; .03; .02 G/100ML; G/100ML; G/100ML; G/100ML
1000 INJECTION, SOLUTION INTRAVENOUS ONCE
Status: COMPLETED | OUTPATIENT
Start: 2018-09-10 | End: 2018-09-10

## 2018-09-10 RX ORDER — CYCLOBENZAPRINE HCL 10 MG
10 TABLET ORAL 2 TIMES DAILY PRN
Status: ON HOLD | COMMUNITY
End: 2018-10-01

## 2018-09-10 RX ORDER — PREDNISONE 1 MG/1
5 TABLET ORAL DAILY
Status: ON HOLD | COMMUNITY
End: 2018-10-01

## 2018-09-10 RX ORDER — DIPHENHYDRAMINE HCL 25 MG
25 TABLET ORAL ONCE
Status: DISCONTINUED | OUTPATIENT
Start: 2018-09-10 | End: 2018-09-10 | Stop reason: HOSPADM

## 2018-09-10 RX ORDER — TIZANIDINE 2 MG/1
2 TABLET ORAL EVERY 8 HOURS PRN
Status: ON HOLD | COMMUNITY
End: 2018-09-19

## 2018-09-10 RX ORDER — ONDANSETRON 4 MG/1
4 TABLET, ORALLY DISINTEGRATING ORAL ONCE
Status: COMPLETED | OUTPATIENT
Start: 2018-09-10 | End: 2018-09-10

## 2018-09-10 RX ORDER — ONDANSETRON 4 MG/1
4 TABLET, FILM COATED ORAL ONCE
Status: DISCONTINUED | OUTPATIENT
Start: 2018-09-10 | End: 2018-09-10

## 2018-09-10 RX ADMIN — KETOROLAC TROMETHAMINE 15 MG: 15 INJECTION, SOLUTION INTRAMUSCULAR; INTRAVENOUS at 09:53

## 2018-09-10 RX ADMIN — ONDANSETRON 4 MG: 4 TABLET, ORALLY DISINTEGRATING ORAL at 10:05

## 2018-09-10 RX ADMIN — SODIUM CHLORIDE, POTASSIUM CHLORIDE, SODIUM LACTATE AND CALCIUM CHLORIDE 1000 ML: 600; 310; 30; 20 INJECTION, SOLUTION INTRAVENOUS at 09:10

## 2018-09-10 ASSESSMENT — ENCOUNTER SYMPTOMS
BACK PAIN: 0
ABDOMINAL PAIN: 1
ROS SKIN COMMENTS: DIFFUSE ITCHING
WHEEZING: 0
SHORTNESS OF BREATH: 0
NAUSEA: 0
VOMITING: 0

## 2018-09-10 ASSESSMENT — PAIN SCALES - GENERAL
PAINLEVEL_OUTOF10: 9
PAINLEVEL_OUTOF10: 8

## 2018-09-10 ASSESSMENT — PAIN DESCRIPTION - ONSET: ONSET: ON-GOING

## 2018-09-10 ASSESSMENT — PAIN DESCRIPTION - DESCRIPTORS: DESCRIPTORS: ACHING;SHARP;THROBBING

## 2018-09-10 ASSESSMENT — PATIENT HEALTH QUESTIONNAIRE - PHQ9: SUM OF ALL RESPONSES TO PHQ QUESTIONS 1-9: 22

## 2018-09-10 ASSESSMENT — PAIN DESCRIPTION - FREQUENCY: FREQUENCY: CONTINUOUS

## 2018-09-10 ASSESSMENT — PAIN DESCRIPTION - LOCATION: LOCATION: GENERALIZED

## 2018-09-10 ASSESSMENT — PAIN DESCRIPTION - PAIN TYPE: TYPE: ACUTE PAIN

## 2018-09-10 ASSESSMENT — PAIN DESCRIPTION - PROGRESSION: CLINICAL_PROGRESSION: GRADUALLY WORSENING

## 2018-09-10 NOTE — ED NOTES
Pt reports that he will not take the benadryl due to \"abdominal pain. \"  Pt requesting to speak to physician. Pt states that Dr. Antolin Galicia stated that he would give him IV benadryl and Toradol. Explained to pt that no such orders exist, but informed that I would speak to physicians for him.        Nacho Tan RN  09/10/18 3675
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
## 2018-09-10 NOTE — ED NOTES
Met with pt at bedside to address issues with follow through and continuous ED visits for pain management. Pt reports that he is currently dosing 80mg of Methadone daily at [facility] on [address] Pt states that he is also linked with psychiatry at this office and sees Dr. Deisi Perry. Pt reports that he was given paper prescriptions for his medications when he was discharged and that they were thrown away from his brother's vehicle. Reminded pt that his  at [facility] [phone]) offered to have a nurse refill his medications and the pt refused at that time stating that he had refills. Pt reports that his depression is solely due to \"everything I'm going through\" and denies suicidal or homicidal ideation at this time. Attempted to stress to pt the importance of maintaining follow up appointments. Pt requesting appointments with GI specialist, dentist, eye doctor, psychiatrist,  and rheumatologist. Kiara Viera with pt about transportation, pt reports that he has medical transportation, but states that due to a change in address, he needs to get to [address] to update his information. Spoke with pt about making this a priority as his medical insurance will be cut off if he does not update his address. Pt demonstrates verbal understanding of the importance of this. Pt reports several times that if he was appropriately linked in the community, it would decrease his need to over utilize emergency room resources. Pt previously had a PCP through [address] who stopped seeing him in August because he was \"a difficult case. \" Pt has very poor insight into the importance of follow up and the issues that stem from his lack of motivation and lack of participation in his recovery. Worked with pt on understanding how each piece of follow up is important to maintain a healthy lifestyle and contribute to his own recovery.  Reiterated to pt that all parties involved are attempting to help him, but he must participate in his care for everything to work. Pt demonstrated verbal understanding of these persistent issues. Pt was advised that due to his contract with the Methadone clinic, he will not be given narcotic pain medication at this facility any longer. An FYI was placed in his chart to ensure that all treatment team members are aware of his full list of medications. Per his Methadone clinic, the pt has inconsistent drug screens and most recently tested positive for fentanyl, but had no medical records to show that he received those medications at any facility. Pt must complete an individualized therapy program through this agency. All phone numbers and addresses were gathered for patient's requested follow up appointments and given to the patient for him to make these appointments at times and dates which he is most sure that he can attend. Pt was educated on the no show policy of most doctor's offices and that he can be dismissed from their practice for no shows. Pt demonstrated verbal understanding. A message was left for the pt's  requesting a return call for continuity of care and follow up in the community once the pt is discharged today.       Will Ortiz [place]  09/10/18 1019

## 2018-09-14 ENCOUNTER — APPOINTMENT (OUTPATIENT)
Dept: GENERAL RADIOLOGY | Age: 28
End: 2018-09-14
Payer: MEDICARE

## 2018-09-14 ENCOUNTER — HOSPITAL ENCOUNTER (EMERGENCY)
Age: 28
Discharge: HOME | End: 2018-09-14
Attending: EMERGENCY MEDICINE
Payer: MEDICARE

## 2018-09-14 VITALS
SYSTOLIC BLOOD PRESSURE: 151 MMHG | BODY MASS INDEX: 33.97 KG/M2 | WEIGHT: 230 LBS | OXYGEN SATURATION: 97 % | TEMPERATURE: 98.4 F | HEART RATE: 93 BPM | RESPIRATION RATE: 19 BRPM | DIASTOLIC BLOOD PRESSURE: 90 MMHG

## 2018-09-14 DIAGNOSIS — R10.9 ABDOMINAL PAIN, UNSPECIFIED ABDOMINAL LOCATION: Primary | ICD-10-CM

## 2018-09-14 DIAGNOSIS — R07.9 CHEST PAIN, UNSPECIFIED TYPE: ICD-10-CM

## 2018-09-14 LAB
-: NORMAL
ALBUMIN SERPL-MCNC: 4.1 G/DL (ref 3.5–5.2)
ALBUMIN/GLOBULIN RATIO: 1.3 (ref 1–2.5)
ALP BLD-CCNC: 69 U/L (ref 40–129)
ALT SERPL-CCNC: 21 U/L (ref 5–41)
AMORPHOUS: NORMAL
ANION GAP SERPL CALCULATED.3IONS-SCNC: 9 MMOL/L (ref 9–17)
AST SERPL-CCNC: 25 U/L
BACTERIA: NORMAL
BILIRUB SERPL-MCNC: 0.26 MG/DL (ref 0.3–1.2)
BILIRUBIN DIRECT: 0.08 MG/DL
BILIRUBIN URINE: NEGATIVE
BILIRUBIN, INDIRECT: 0.18 MG/DL (ref 0–1)
BUN BLDV-MCNC: 8 MG/DL (ref 6–20)
BUN/CREAT BLD: ABNORMAL (ref 9–20)
CALCIUM SERPL-MCNC: 8.6 MG/DL (ref 8.6–10.4)
CASTS UA: NORMAL /LPF (ref 0–8)
CHLORIDE BLD-SCNC: 94 MMOL/L (ref 98–107)
CO2: 27 MMOL/L (ref 20–31)
COLOR: YELLOW
CREAT SERPL-MCNC: 0.84 MG/DL (ref 0.7–1.2)
CRYSTALS, UA: NORMAL /HPF
EKG ATRIAL RATE: 83 BPM
EKG P AXIS: 35 DEGREES
EKG P-R INTERVAL: 140 MS
EKG Q-T INTERVAL: 364 MS
EKG QRS DURATION: 74 MS
EKG QTC CALCULATION (BAZETT): 427 MS
EKG R AXIS: 34 DEGREES
EKG T AXIS: 24 DEGREES
EKG VENTRICULAR RATE: 83 BPM
EPITHELIAL CELLS UA: NORMAL /HPF (ref 0–5)
GFR AFRICAN AMERICAN: >60 ML/MIN
GFR NON-AFRICAN AMERICAN: >60 ML/MIN
GFR SERPL CREATININE-BSD FRML MDRD: ABNORMAL ML/MIN/{1.73_M2}
GFR SERPL CREATININE-BSD FRML MDRD: ABNORMAL ML/MIN/{1.73_M2}
GLOBULIN: ABNORMAL G/DL (ref 1.5–3.8)
GLUCOSE BLD-MCNC: 106 MG/DL (ref 70–99)
GLUCOSE URINE: NEGATIVE
HCT VFR BLD CALC: 42.2 % (ref 40.7–50.3)
HEMOGLOBIN: 13.4 G/DL (ref 13–17)
KETONES, URINE: NEGATIVE
LEUKOCYTE ESTERASE, URINE: NEGATIVE
LIPASE: 22 U/L (ref 13–60)
MCH RBC QN AUTO: 29.2 PG (ref 25.2–33.5)
MCHC RBC AUTO-ENTMCNC: 31.8 G/DL (ref 28.4–34.8)
MCV RBC AUTO: 91.9 FL (ref 82.6–102.9)
MUCUS: NORMAL
NITRITE, URINE: NEGATIVE
NRBC AUTOMATED: 0 PER 100 WBC
OTHER OBSERVATIONS UA: NORMAL
PDW BLD-RTO: 13.1 % (ref 11.8–14.4)
PH UA: 7.5 (ref 5–8)
PLATELET # BLD: 274 K/UL (ref 138–453)
PMV BLD AUTO: 9.7 FL (ref 8.1–13.5)
POTASSIUM SERPL-SCNC: 3.5 MMOL/L (ref 3.7–5.3)
PROTEIN UA: NEGATIVE
RBC # BLD: 4.59 M/UL (ref 4.21–5.77)
RBC UA: NORMAL /HPF (ref 0–4)
RENAL EPITHELIAL, UA: NORMAL /HPF
SODIUM BLD-SCNC: 130 MMOL/L (ref 135–144)
SPECIFIC GRAVITY UA: 1.01 (ref 1–1.03)
TOTAL PROTEIN: 7.2 G/DL (ref 6.4–8.3)
TRICHOMONAS: NORMAL
TURBIDITY: CLEAR
URINE HGB: NEGATIVE
UROBILINOGEN, URINE: NORMAL
WBC # BLD: 6.7 K/UL (ref 3.5–11.3)
WBC UA: NORMAL /HPF (ref 0–5)
YEAST: NORMAL

## 2018-09-14 PROCEDURE — 99285 EMERGENCY DEPT VISIT HI MDM: CPT

## 2018-09-14 PROCEDURE — 80048 BASIC METABOLIC PNL TOTAL CA: CPT

## 2018-09-14 PROCEDURE — 81001 URINALYSIS AUTO W/SCOPE: CPT

## 2018-09-14 PROCEDURE — 80076 HEPATIC FUNCTION PANEL: CPT

## 2018-09-14 PROCEDURE — 96375 TX/PRO/DX INJ NEW DRUG ADDON: CPT

## 2018-09-14 PROCEDURE — 96374 THER/PROPH/DIAG INJ IV PUSH: CPT

## 2018-09-14 PROCEDURE — 74022 RADEX COMPL AQT ABD SERIES: CPT

## 2018-09-14 PROCEDURE — 83690 ASSAY OF LIPASE: CPT

## 2018-09-14 PROCEDURE — 85027 COMPLETE CBC AUTOMATED: CPT

## 2018-09-14 PROCEDURE — 6360000002 HC RX W HCPCS: Performed by: EMERGENCY MEDICINE

## 2018-09-14 PROCEDURE — 93005 ELECTROCARDIOGRAM TRACING: CPT

## 2018-09-14 PROCEDURE — 87086 URINE CULTURE/COLONY COUNT: CPT

## 2018-09-14 RX ORDER — KETOROLAC TROMETHAMINE 30 MG/ML
30 INJECTION, SOLUTION INTRAMUSCULAR; INTRAVENOUS ONCE
Status: COMPLETED | OUTPATIENT
Start: 2018-09-14 | End: 2018-09-14

## 2018-09-14 RX ORDER — ONDANSETRON 4 MG/1
4 TABLET, ORALLY DISINTEGRATING ORAL EVERY 8 HOURS PRN
Qty: 10 TABLET | Refills: 0 | Status: SHIPPED | OUTPATIENT
Start: 2018-09-14 | End: 2018-09-17

## 2018-09-14 RX ORDER — KETOROLAC TROMETHAMINE 30 MG/ML
30 INJECTION, SOLUTION INTRAMUSCULAR; INTRAVENOUS ONCE
Status: DISCONTINUED | OUTPATIENT
Start: 2018-09-14 | End: 2018-09-14

## 2018-09-14 RX ORDER — KETOROLAC TROMETHAMINE 10 MG/1
10 TABLET, FILM COATED ORAL EVERY 6 HOURS PRN
Qty: 20 TABLET | Refills: 0 | Status: ON HOLD | OUTPATIENT
Start: 2018-09-14 | End: 2019-01-14 | Stop reason: HOSPADM

## 2018-09-14 RX ORDER — PROMETHAZINE HYDROCHLORIDE 25 MG/ML
12.5 INJECTION, SOLUTION INTRAMUSCULAR; INTRAVENOUS ONCE
Status: COMPLETED | OUTPATIENT
Start: 2018-09-14 | End: 2018-09-14

## 2018-09-14 RX ADMIN — KETOROLAC TROMETHAMINE 30 MG: 30 INJECTION, SOLUTION INTRAMUSCULAR; INTRAVENOUS at 02:17

## 2018-09-14 RX ADMIN — PROMETHAZINE HYDROCHLORIDE 12.5 MG: 25 INJECTION, SOLUTION INTRAMUSCULAR; INTRAVENOUS at 02:17

## 2018-09-14 ASSESSMENT — ENCOUNTER SYMPTOMS
COUGH: 0
SORE THROAT: 0
EYE REDNESS: 0
NAUSEA: 0
RHINORRHEA: 0
DIARRHEA: 0
VOMITING: 0
EYE DISCHARGE: 0
SHORTNESS OF BREATH: 0
CHEST TIGHTNESS: 0
ABDOMINAL PAIN: 1
BLOOD IN STOOL: 0

## 2018-09-14 ASSESSMENT — PAIN SCALES - GENERAL: PAINLEVEL_OUTOF10: 10

## 2018-09-14 NOTE — ED PROVIDER NOTES
8/13/18   Sohail Quiñones MD   doxycycline monohydrate (MONODOX) 100 MG capsule Take 1 capsule by mouth daily 8/14/18   Sohail Quiñones MD   sucralfate (CARAFATE) 1 GM tablet Take 1 tablet by mouth 4 times daily 8/13/18   Sohail Quiñones MD   vitamin D (ERGOCALCIFEROL) 38826 units CAPS capsule Take 1 capsule by mouth once a week 8/13/18   Sohail Quiñones MD   methadone 10 MG/5ML solution Take 94 mg by mouth every 4 hours as needed for Pain. Brian Escobar MD       REVIEW OF SYSTEMS    (2-9 systems for level 4, 10 or more for level 5)      Review of Systems   Constitutional: Negative for chills and fever. HENT: Negative for congestion, rhinorrhea and sore throat. Eyes: Negative for discharge and redness. Respiratory: Negative for cough, chest tightness and shortness of breath. Cardiovascular: Positive for chest pain. Gastrointestinal: Positive for abdominal pain. Negative for blood in stool, diarrhea, nausea and vomiting. Endocrine: Negative for polydipsia and polyuria. Genitourinary: Negative for dysuria and hematuria. Musculoskeletal: Negative for neck pain and neck stiffness. Skin: Negative for rash and wound. Allergic/Immunologic: Negative for immunocompromised state. Neurological: Negative for weakness, numbness and headaches. Hematological: Negative for adenopathy. Psychiatric/Behavioral: Negative for agitation and behavioral problems. PHYSICAL EXAM   (up to 7 for level 4, 8 or more for level 5)      INITIAL VITALS:   BP (!) 151/90   Pulse 93   Temp 98.4 °F (36.9 °C) (Oral)   Resp 19   Wt 230 lb (104.3 kg)   SpO2 97%   BMI 33.97 kg/m²     Physical Exam   Constitutional: He is oriented to person, place, and time. He appears well-developed and well-nourished. No distress. Non-toxic appearing, resting comfortably, normal vitals (mild HTN)   HENT:   Head: Normocephalic and atraumatic. Eyes: Pupils are equal, round, and reactive to light. Conjunctivae and EOM are normal.   Neck: Normal range of motion. Neck supple. No JVD present. No tracheal deviation present. Cardiovascular: Normal rate, regular rhythm, normal heart sounds and intact distal pulses. Pulmonary/Chest: Effort normal and breath sounds normal. No stridor. No respiratory distress. He has no wheezes. He has no rales. He exhibits no tenderness. Abdominal: Soft. Bowel sounds are normal. He exhibits no distension. There is no tenderness. There is no rebound and no guarding. Non-peritoneal, non-tender, normal bowel sounds, no significant tenderness to palpation   Musculoskeletal: Normal range of motion. He exhibits no edema. Neurological: He is alert and oriented to person, place, and time. No cranial nerve deficit. Skin: Skin is warm and dry. No rash noted. acne   Psychiatric: He has a normal mood and affect.        DIFFERENTIAL  DIAGNOSIS     PLAN (LABS / IMAGING / EKG):  Orders Placed This Encounter   Procedures    Urine Culture    XR Acute Abd Series Chest 1 VW    CBC    BASIC METABOLIC PANEL    LIPASE    HEPATIC FUNCTION PANEL    Urinalysis with Microscopic    EKG 12 Lead       MEDICATIONS ORDERED:  Orders Placed This Encounter   Medications    DISCONTD: ketorolac (TORADOL) injection 30 mg    promethazine (PHENERGAN) injection 12.5 mg    ketorolac (TORADOL) injection 30 mg    ketorolac (TORADOL) 10 MG tablet     Sig: Take 1 tablet by mouth every 6 hours as needed for Pain     Dispense:  20 tablet     Refill:  0    ondansetron (ZOFRAN ODT) 4 MG disintegrating tablet     Sig: Take 1 tablet by mouth every 8 hours as needed for Nausea     Dispense:  10 tablet     Refill:  0       DDX: gastritis, inflammatory bowel, irritable bowel syndrome, UTI, pancreatitis, kidney stone, pain medication seeking behavior    DIAGNOSTIC RESULTS / [facility] / [facility]     LABS:  Results for orders placed or performed during the hospital encounter of 09/14/18   CBC

## 2018-09-15 LAB
CULTURE: NO GROWTH
Lab: NORMAL
SPECIMEN DESCRIPTION: NORMAL
STATUS: NORMAL

## 2018-09-17 ENCOUNTER — APPOINTMENT (OUTPATIENT)
Dept: CT IMAGING | Age: 28
End: 2018-09-17
Payer: MEDICARE

## 2018-09-17 ENCOUNTER — OFFICE VISIT (OUTPATIENT)
Dept: FAMILY MEDICINE CLINIC | Age: 28
End: 2018-09-17
Payer: MEDICARE

## 2018-09-17 ENCOUNTER — HOSPITAL ENCOUNTER (OUTPATIENT)
Age: 28
Setting detail: OBSERVATION
Discharge: PSYCHIATRIC OR SUBSTANCE USE FACILITY | End: 2018-09-19
Attending: EMERGENCY MEDICINE | Admitting: INTERNAL MEDICINE
Payer: MEDICARE

## 2018-09-17 VITALS
HEART RATE: 86 BPM | TEMPERATURE: 98 F | WEIGHT: 232 LBS | HEIGHT: 69 IN | BODY MASS INDEX: 34.36 KG/M2 | SYSTOLIC BLOOD PRESSURE: 113 MMHG | DIASTOLIC BLOOD PRESSURE: 80 MMHG

## 2018-09-17 DIAGNOSIS — K92.1 BLOOD IN STOOL: ICD-10-CM

## 2018-09-17 DIAGNOSIS — R11.0 NAUSEA: ICD-10-CM

## 2018-09-17 DIAGNOSIS — R45.851 SUICIDAL IDEATION: ICD-10-CM

## 2018-09-17 DIAGNOSIS — F25.1 SCHIZOAFFECTIVE DISORDER, DEPRESSIVE TYPE (HCC): ICD-10-CM

## 2018-09-17 DIAGNOSIS — R10.84 GENERALIZED ABDOMINAL PAIN: Primary | ICD-10-CM

## 2018-09-17 DIAGNOSIS — R19.7 DIARRHEA, UNSPECIFIED TYPE: ICD-10-CM

## 2018-09-17 PROBLEM — R10.9 ABDOMINAL PAIN: Status: ACTIVE | Noted: 2018-09-17

## 2018-09-17 LAB
EKG ATRIAL RATE: 86 BPM
EKG P AXIS: 42 DEGREES
EKG P-R INTERVAL: 136 MS
EKG Q-T INTERVAL: 374 MS
EKG QRS DURATION: 72 MS
EKG QTC CALCULATION (BAZETT): 447 MS
EKG R AXIS: 46 DEGREES
EKG T AXIS: 27 DEGREES
EKG VENTRICULAR RATE: 86 BPM

## 2018-09-17 PROCEDURE — 94664 DEMO&/EVAL PT USE INHALER: CPT

## 2018-09-17 PROCEDURE — G8417 CALC BMI ABV UP PARAM F/U: HCPCS | Performed by: FAMILY MEDICINE

## 2018-09-17 PROCEDURE — 99213 OFFICE O/P EST LOW 20 MIN: CPT | Performed by: FAMILY MEDICINE

## 2018-09-17 PROCEDURE — 4004F PT TOBACCO SCREEN RCVD TLK: CPT | Performed by: FAMILY MEDICINE

## 2018-09-17 PROCEDURE — 93005 ELECTROCARDIOGRAM TRACING: CPT

## 2018-09-17 PROCEDURE — G0378 HOSPITAL OBSERVATION PER HR: HCPCS

## 2018-09-17 PROCEDURE — 99285 EMERGENCY DEPT VISIT HI MDM: CPT

## 2018-09-17 PROCEDURE — G8427 DOCREV CUR MEDS BY ELIG CLIN: HCPCS | Performed by: FAMILY MEDICINE

## 2018-09-17 PROCEDURE — 6370000000 HC RX 637 (ALT 250 FOR IP): Performed by: INTERNAL MEDICINE

## 2018-09-17 RX ORDER — GABAPENTIN 300 MG/1
300 CAPSULE ORAL 3 TIMES DAILY
Qty: 90 CAPSULE | Refills: 0 | Status: CANCELLED | OUTPATIENT
Start: 2018-09-17 | End: 2018-10-17

## 2018-09-17 RX ORDER — SODIUM CHLORIDE 0.9 % (FLUSH) 0.9 %
10 SYRINGE (ML) INJECTION PRN
Status: DISCONTINUED | OUTPATIENT
Start: 2018-09-17 | End: 2018-09-19 | Stop reason: HOSPADM

## 2018-09-17 RX ORDER — NICOTINE 21 MG/24HR
1 PATCH, TRANSDERMAL 24 HOURS TRANSDERMAL DAILY
Status: DISCONTINUED | OUTPATIENT
Start: 2018-09-17 | End: 2018-09-19 | Stop reason: HOSPADM

## 2018-09-17 RX ORDER — ACETAMINOPHEN 325 MG/1
650 TABLET ORAL EVERY 6 HOURS PRN
Qty: 30 TABLET | Refills: 0 | Status: CANCELLED | OUTPATIENT
Start: 2018-09-17

## 2018-09-17 RX ORDER — DOXYCYCLINE 100 MG/1
100 CAPSULE ORAL DAILY
Qty: 30 CAPSULE | Refills: 0 | Status: CANCELLED | OUTPATIENT
Start: 2018-09-17

## 2018-09-17 RX ORDER — TIZANIDINE 2 MG/1
2 TABLET ORAL EVERY 8 HOURS PRN
Status: CANCELLED | OUTPATIENT
Start: 2018-09-17

## 2018-09-17 RX ORDER — BUPROPION HYDROCHLORIDE 100 MG/1
100 TABLET ORAL DAILY
Qty: 30 TABLET | Refills: 0 | Status: CANCELLED | OUTPATIENT
Start: 2018-09-17

## 2018-09-17 RX ORDER — QUETIAPINE FUMARATE 100 MG/1
100 TABLET, FILM COATED ORAL NIGHTLY
Qty: 30 TABLET | Refills: 0 | Status: CANCELLED | OUTPATIENT
Start: 2018-09-17

## 2018-09-17 RX ORDER — SODIUM CHLORIDE 0.9 % (FLUSH) 0.9 %
10 SYRINGE (ML) INJECTION EVERY 12 HOURS SCHEDULED
Status: DISCONTINUED | OUTPATIENT
Start: 2018-09-17 | End: 2018-09-19 | Stop reason: HOSPADM

## 2018-09-17 RX ORDER — ONDANSETRON 4 MG/1
4 TABLET, ORALLY DISINTEGRATING ORAL EVERY 8 HOURS PRN
Qty: 10 TABLET | Refills: 0 | Status: CANCELLED | OUTPATIENT
Start: 2018-09-17

## 2018-09-17 RX ORDER — FLUTICASONE FUROATE AND VILANTEROL 200; 25 UG/1; UG/1
1 POWDER RESPIRATORY (INHALATION) DAILY
Status: DISCONTINUED | OUTPATIENT
Start: 2018-09-17 | End: 2018-09-19 | Stop reason: HOSPADM

## 2018-09-17 RX ORDER — METHADONE HYDROCHLORIDE 10 MG/5ML
94 SOLUTION ORAL EVERY 4 HOURS PRN
Status: CANCELLED | OUTPATIENT
Start: 2018-09-17

## 2018-09-17 RX ORDER — DOXYCYCLINE 100 MG/1
100 CAPSULE ORAL DAILY
Status: DISCONTINUED | OUTPATIENT
Start: 2018-09-17 | End: 2018-09-19 | Stop reason: HOSPADM

## 2018-09-17 RX ORDER — IBUPROFEN 400 MG/1
400 TABLET ORAL
Status: DISCONTINUED | OUTPATIENT
Start: 2018-09-17 | End: 2018-09-17

## 2018-09-17 RX ORDER — QUETIAPINE FUMARATE 100 MG/1
100 TABLET, FILM COATED ORAL NIGHTLY
Status: DISCONTINUED | OUTPATIENT
Start: 2018-09-17 | End: 2018-09-19 | Stop reason: HOSPADM

## 2018-09-17 RX ORDER — ALBUTEROL SULFATE 90 UG/1
2 AEROSOL, METERED RESPIRATORY (INHALATION) EVERY 6 HOURS PRN
Qty: 18 G | Refills: 0 | Status: CANCELLED | OUTPATIENT
Start: 2018-09-17

## 2018-09-17 RX ORDER — CYCLOBENZAPRINE HCL 10 MG
10 TABLET ORAL 3 TIMES DAILY PRN
Status: DISCONTINUED | OUTPATIENT
Start: 2018-09-17 | End: 2018-09-19 | Stop reason: HOSPADM

## 2018-09-17 RX ORDER — CYCLOBENZAPRINE HCL 10 MG
10 TABLET ORAL 3 TIMES DAILY PRN
Status: CANCELLED | OUTPATIENT
Start: 2018-09-17

## 2018-09-17 RX ORDER — ALBUTEROL SULFATE 90 UG/1
2 AEROSOL, METERED RESPIRATORY (INHALATION) EVERY 6 HOURS PRN
Status: DISCONTINUED | OUTPATIENT
Start: 2018-09-17 | End: 2018-09-19 | Stop reason: HOSPADM

## 2018-09-17 RX ORDER — FLUTICASONE FUROATE AND VILANTEROL 200; 25 UG/1; UG/1
1 POWDER RESPIRATORY (INHALATION) DAILY
Qty: 1 EACH | Refills: 0 | Status: CANCELLED | OUTPATIENT
Start: 2018-09-17

## 2018-09-17 RX ORDER — ACETAMINOPHEN 325 MG/1
650 TABLET ORAL EVERY 4 HOURS PRN
Status: DISCONTINUED | OUTPATIENT
Start: 2018-09-17 | End: 2018-09-19 | Stop reason: HOSPADM

## 2018-09-17 RX ORDER — MELOXICAM 7.5 MG/1
7.5 TABLET ORAL DAILY
Qty: 30 TABLET | Refills: 0 | Status: CANCELLED | OUTPATIENT
Start: 2018-09-17

## 2018-09-17 RX ORDER — TIZANIDINE 2 MG/1
2 TABLET ORAL EVERY 8 HOURS PRN
Status: DISCONTINUED | OUTPATIENT
Start: 2018-09-17 | End: 2018-09-19 | Stop reason: HOSPADM

## 2018-09-17 RX ORDER — ERGOCALCIFEROL 1.25 MG/1
50000 CAPSULE ORAL WEEKLY
Qty: 4 CAPSULE | Refills: 0 | Status: CANCELLED | OUTPATIENT
Start: 2018-09-17

## 2018-09-17 RX ORDER — PREDNISONE 10 MG/1
5 TABLET ORAL DAILY
Status: DISCONTINUED | OUTPATIENT
Start: 2018-09-17 | End: 2018-09-19 | Stop reason: HOSPADM

## 2018-09-17 RX ORDER — PANTOPRAZOLE SODIUM 40 MG/1
40 TABLET, DELAYED RELEASE ORAL
Status: DISCONTINUED | OUTPATIENT
Start: 2018-09-18 | End: 2018-09-19 | Stop reason: HOSPADM

## 2018-09-17 RX ORDER — GABAPENTIN 300 MG/1
600 CAPSULE ORAL 3 TIMES DAILY
Status: DISCONTINUED | OUTPATIENT
Start: 2018-09-17 | End: 2018-09-19 | Stop reason: HOSPADM

## 2018-09-17 RX ORDER — ONDANSETRON 8 MG/1
8 TABLET, ORALLY DISINTEGRATING ORAL EVERY 8 HOURS PRN
Qty: 24 TABLET | Refills: 0 | Status: SHIPPED | OUTPATIENT
Start: 2018-09-17 | End: 2018-09-17

## 2018-09-17 RX ORDER — PREDNISONE 1 MG/1
5 TABLET ORAL DAILY
Status: CANCELLED | OUTPATIENT
Start: 2018-09-17

## 2018-09-17 RX ORDER — MELOXICAM 15 MG/1
15 TABLET ORAL DAILY
Status: DISCONTINUED | OUTPATIENT
Start: 2018-09-17 | End: 2018-09-19 | Stop reason: HOSPADM

## 2018-09-17 RX ORDER — ARIPIPRAZOLE 10 MG/1
10 TABLET ORAL DAILY
Qty: 30 TABLET | Refills: 0 | Status: CANCELLED | OUTPATIENT
Start: 2018-09-17

## 2018-09-17 RX ORDER — DULOXETIN HYDROCHLORIDE 30 MG/1
90 CAPSULE, DELAYED RELEASE ORAL DAILY
Qty: 90 CAPSULE | Refills: 0 | Status: CANCELLED | OUTPATIENT
Start: 2018-09-17

## 2018-09-17 RX ORDER — PANTOPRAZOLE SODIUM 20 MG/1
20 TABLET, DELAYED RELEASE ORAL DAILY
Qty: 7 TABLET | Refills: 0 | Status: CANCELLED | OUTPATIENT
Start: 2018-09-17 | End: 2018-09-24

## 2018-09-17 RX ORDER — WATER / MINERAL OIL / WHITE PETROLATUM 16 OZ
CREAM TOPICAL
Qty: 1 PACKAGE | Refills: 0 | Status: CANCELLED | OUTPATIENT
Start: 2018-09-17

## 2018-09-17 RX ORDER — ERGOCALCIFEROL 1.25 MG/1
50000 CAPSULE ORAL WEEKLY
Status: DISCONTINUED | OUTPATIENT
Start: 2018-09-17 | End: 2018-09-19 | Stop reason: HOSPADM

## 2018-09-17 RX ORDER — ESOMEPRAZOLE MAGNESIUM 40 MG/1
40 CAPSULE, DELAYED RELEASE ORAL
Status: ON HOLD | COMMUNITY
End: 2018-10-01 | Stop reason: HOSPADM

## 2018-09-17 RX ORDER — OLANZAPINE 10 MG/1
10 TABLET ORAL NIGHTLY
Qty: 30 TABLET | Refills: 0 | Status: CANCELLED | OUTPATIENT
Start: 2018-09-17

## 2018-09-17 RX ORDER — SUCRALFATE 1 G/1
1 TABLET ORAL 4 TIMES DAILY
Qty: 40 TABLET | Refills: 0 | Status: CANCELLED | OUTPATIENT
Start: 2018-09-17

## 2018-09-17 RX ORDER — KETOROLAC TROMETHAMINE 10 MG/1
10 TABLET, FILM COATED ORAL EVERY 6 HOURS PRN
Qty: 20 TABLET | Refills: 0 | Status: CANCELLED | OUTPATIENT
Start: 2018-09-17

## 2018-09-17 ASSESSMENT — PAIN DESCRIPTION - LOCATION: LOCATION: ABDOMEN

## 2018-09-17 ASSESSMENT — ENCOUNTER SYMPTOMS
NAUSEA: 1
EYE DISCHARGE: 0
EYE PAIN: 0
ABDOMINAL PAIN: 1
BLURRED VISION: 0
ABDOMINAL PAIN: 1
SHORTNESS OF BREATH: 0
COUGH: 0
BACK PAIN: 0
CONSTIPATION: 0
SHORTNESS OF BREATH: 0
DIARRHEA: 1
WHEEZING: 0
BLOOD IN STOOL: 1
RHINORRHEA: 0
VOMITING: 1
NAUSEA: 1
DIARRHEA: 0
COUGH: 0
VOMITING: 1
EYE REDNESS: 0

## 2018-09-17 ASSESSMENT — PAIN DESCRIPTION - PAIN TYPE: TYPE: ACUTE PAIN

## 2018-09-17 ASSESSMENT — PAIN SCALES - GENERAL: PAINLEVEL_OUTOF10: 7

## 2018-09-17 NOTE — PROGRESS NOTES
RN offered home medications to patient that he asked for and is now refusing medications. Still refusing labwork and refusing IV start.

## 2018-09-17 NOTE — PROGRESS NOTES
Laura spoke with RN stating pt stated a number of times thoughts of self harm. Pt became very upset that laura said this and said he is a liar, that writer is a liar and ER is a liar. He does not believe he is pink slipped and is becoming increasingly agitated. Rn tries to talk to the pt, he is still refusing any testing, refusing any treatment at this time.

## 2018-09-17 NOTE — PROGRESS NOTES
Subjective:    Lisseth Vallejo is a 32 y.o. male with  has a past medical history of Anxiety; Arthritis; Asthma; Bipolar I disorder, most recent episode (or current) depressed, unspecified; Clostridium difficile infection; COPD (chronic obstructive pulmonary disease) ([address] Depression; Disease of blood and blood forming organ; Eczema; Fracture, metacarpal; Gastric ulcer; Gastritis; GERD (gastroesophageal reflux disease); GI bleed; H. pylori infection; H/O blood clots; Head injury; Headache; Insomnia; Juvenile rheumatoid arthritis ([address] Neuromuscular disorder ([address] PFAPA syndrome ([address] PUD (peptic ulcer disease); Rheumatoid arthritis ([address] Rheumatoid arthritis(714.0); Sleep apnea; 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[address] Substance abuse; Suicidal ideation; Suicide attempt by hanging ([facility] Tobacco dependence; Ulcerative colitis ([address] and UTI (urinary tract infection). HPI   Patient presents for ER follow up. He has had several ED visits for nausea, vomiting, abdominal pain and blood in stool. There is concern for pain medication seeking behavior. Patient is listing multiple symptoms and asking for specific pain medications. Vitals and physical exam benign. Patient states he did have appointment with Dr. Juan Cotton for EGD/colonoscopy but did not have a ride to the procedure. Review of Systems   Constitutional: Negative for chills and fever. Eyes: Negative for blurred vision. Respiratory: Negative for cough, shortness of breath and wheezing. Cardiovascular: Negative for chest pain. Gastrointestinal: Positive for abdominal pain, blood in stool, nausea and vomiting. Negative for constipation and diarrhea. Neurological: Negative for dizziness and headaches.        Objective:    /80 (Site: Left Upper Arm, Position: Sitting, Cuff Size: Medium Adult)   Pulse 86   Temp 98 °F (36.7 °C) (Temporal)   Ht 5' 9\" (1.753 m)   Wt 232 lb (105.2 kg)   BMI 34.26 kg/m²    BP Readings from Last 3 Encounters:   09/17/18 113/80 09/14/18 (!) 151/90   09/10/18 130/70     Physical Exam   Constitutional: He is oriented to person, place, and time and well-developed, well-nourished, and in no distress. No distress. HENT:   Head: Normocephalic and atraumatic. Cardiovascular: Normal rate, regular rhythm, normal heart sounds and intact distal pulses. No murmur heard. Pulmonary/Chest: Effort normal and breath sounds normal. He has no wheezes. Abdominal: Soft. Bowel sounds are normal. He exhibits no distension and no mass. There is no tenderness. There is no rebound and no guarding. Lymphadenopathy:     He has no cervical adenopathy. Neurological: He is alert and oriented to person, place, and time. Skin: He is not diaphoretic. Nursing note and vitals reviewed. BP Readings from Last 3 Encounters:   09/17/18 113/80   09/14/18 (!) 151/90   09/10/18 130/70     /80 (Site: Left Upper Arm, Position: Sitting, Cuff Size: Medium Adult)   Pulse 86   Temp 98 °F (36.7 °C) (Temporal)   Ht 5' 9\" (1.753 m)   Wt 232 lb (105.2 kg)   BMI 34.26 kg/m²   Lab Results   Component Value Date    WBC 6.7 09/14/2018    HGB 13.4 09/14/2018    HCT 42.2 09/14/2018     09/14/2018    CHOL 205 (H) 02/21/2017    TRIG 189 (H) 02/21/2017    HDL 44 02/21/2017    ALT 21 09/14/2018    AST 25 09/14/2018     (L) 09/14/2018    K 3.5 (L) 09/14/2018    CL 94 (L) 09/14/2018    CREATININE 0.84 09/14/2018    BUN 8 09/14/2018    CO2 27 09/14/2018    TSH 0.59 03/28/2018    INR 1.0 05/09/2018    LABA1C 5.7 10/23/2014     Lab Results   Component Value Date    CALCIUM 8.6 09/14/2018    PHOS 4.2 07/28/2014     Lab Results   Component Value Date    LDLCHOLESTEROL 123 02/21/2017       Assessment and Plan:    1. Generalized abdominal pain  Will prescribe Zofran 8 mg ODT prn  Patient to make follow up appointment with GI to have EGD/colonoscopy rescheduled. 2. Nausea  See above    3.  Blood in stool   See above      Maylin Obrien received counseling on the following
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
## 2018-09-17 NOTE — PROGRESS NOTES
Visit Information    Have you changed or started any medications since your last visit including any over-the-counter medicines, vitamins, or herbal medicines? no   Have you stopped taking any of your medications? Is so, why? -  no  Are you having any side effects from any of your medications? - no    Have you seen any other physician or provider since your last visit?  no   Have you had any other diagnostic tests since your last visit?  no   Have you been seen in the emergency room and/or had an admission in a hospital since we last saw you?  yes -  leonarda    Have you had your routine dental cleaning in the past 6 months?  no     Do you have an active MyChart account? If no, what is the barrier?   Yes    Patient Care Team:  Sebastian Bull MD as PCP - General (Family Medicine)    Medical History Review  Past Medical, Family, and Social History reviewed and does not contribute to the patient presenting condition    Health Maintenance   Topic Date Due    DTaP/Tdap/Td vaccine (1 - Tdap) 10/20/2009    A1C test (Diabetic or Prediabetic)  10/23/2015    Flu vaccine (1) 09/01/2018    Pneumococcal med risk  Completed    HIV screen  Completed

## 2018-09-17 NOTE — PATIENT INSTRUCTIONS
Thank you for letting us take care of you today. We hope all your questions were addressed. If a question was overlooked or something else comes to mind after you return home, please contact a member of your Care Team listed below. Please make sure you have a routine office visit set up to follow-up on [address]. Your Care Team at [place] [address] is Team #2  Alisha Nuno DO (Faculty)  Tha Amezquita MD (Resident)  Elroy Terry MD (Resident)  Luis Manuel Felder MD (Resident)  Reyna Christian MD (Resident)  Claudell Faes, MD (Resident)  Cherly Aase, LPN Gayla Crouch., Brian Aguillon, [address] ([address] [facility])  Re Mustafa RN, ([address] [facility])  Samm Feliciano, Ph.D., (Behavioral Services)  Tenisha Hendrickson [facility] (Clinical Pharmacist)     Office phone number: [phone]    If you need to get in right away due to illness, please be advised we have \"Same Day\" appointments available Monday-Friday. Please call us at [phone] option #3 to schedule your \"Same Day\" appointment. Patient Education        Nausea and Vomiting: Care Instructions  Your Care Instructions    When you are nauseated, you may feel weak and sweaty and notice a lot of saliva in your mouth. Nausea often leads to vomiting. Most of the time you do not need to worry about nausea and vomiting, but they can be signs of other illnesses. Two common causes of nausea and vomiting are stomach flu and food poisoning. Nausea and vomiting from viral stomach flu will usually start to improve within 24 hours. Nausea and vomiting from food poisoning may last from 12 to 48 hours. The doctor has checked you carefully, but problems can develop later. If you notice any problems or new symptoms, get medical treatment right away. Follow-up care is a key part of your treatment and safety. Be sure to make and go to all appointments, and call your doctor if you are having problems.  It's also a good idea to know your test

## 2018-09-17 NOTE — ED PROVIDER NOTES
[address]  eMERGENCY dEPARTMENT eNCOUnter      Pt Name: Nate Mckenna  MRN: [id number]  [place] 1990  Date of evaluation: 9/17/2018  PCP:    Marcos Ely MD      [address] [place] [address]       Chief Complaint   Patient presents with    Mental Health Problem    Abdominal Pain    Chest Pain         HISTORY OF PRESENT ILLNESS      Nate Mckenna is a 32 y.o. male who presents  For evaluation for multiple complaints. Patient's initial complaint is abdominal pain. States he's had this for over a week. Pain all over. So stated nausea vomiting and diarrhea. There may be some blood in the diarrhea as well. He states had this before. He has history of nonspecific colitis. Also states she's having depression. Not taking his medications. He is having thoughts of hurting himself. Otherwise no other complaints      REVIEW OF SYSTEMS         Review of Systems   Constitutional: Negative for chills and fever. HENT: Negative for congestion and rhinorrhea. Eyes: Negative for pain, discharge and redness. Respiratory: Negative for cough and shortness of breath. Cardiovascular: Negative for chest pain. Gastrointestinal: Positive for abdominal pain, diarrhea, nausea and vomiting. Genitourinary: Negative for dysuria and hematuria. Musculoskeletal: Negative for arthralgias, back pain, myalgias, neck pain and neck stiffness. Skin: Negative for rash. Neurological: Negative for light-headedness and headaches. Hematological: Does not bruise/bleed easily. Psychiatric/Behavioral: Positive for suicidal ideas.           PAST MEDICAL HISTORY     Past Medical History:   Diagnosis Date    Anxiety     Arthritis     Asthma     Bipolar I disorder, most recent episode (or current) depressed, unspecified 9/12/2014    Clostridium difficile infection     COPD (chronic obstructive pulmonary disease) ([facility] [address] [facility]     Depression     Disease of blood and blood forming organ     Eczema     tablet by mouth daily    BUPROPION (WELLBUTRIN) 100 MG TABLET    Take 1 tablet by mouth daily    CYCLOBENZAPRINE (FLEXERIL) 10 MG TABLET    Take 10 mg by mouth 3 times daily as needed for Muscle spasms    DOXYCYCLINE MONOHYDRATE (MONODOX) 100 MG CAPSULE    Take 1 capsule by mouth daily    DULOXETINE (CYMBALTA) 30 MG EXTENDED RELEASE CAPSULE    Take 3 capsules by mouth daily    FLUTICASONE FUROATE-VILANTEROL (BREO ELLIPTA) 200-25 MCG/INH AEPB    Inhale 1 puff into the lungs daily    GABAPENTIN (NEURONTIN) 300 MG CAPSULE    Take 1 capsule by mouth 3 times daily for 30 days. Alysha Allen KETOROLAC (TORADOL) 10 MG TABLET    Take 1 tablet by mouth every 6 hours as needed for Pain    MELOXICAM (MOBIC) 7.5 MG TABLET    Take 1 tablet by mouth daily    METHADONE 10 MG/5ML SOLUTION    Take 94 mg by mouth every 4 hours as needed for Pain. Alysha Allen OLANZAPINE (ZYPREXA) 10 MG TABLET    Take 1 tablet by mouth nightly    ONDANSETRON (ZOFRAN ODT) 4 MG DISINTEGRATING TABLET    Take 1 tablet by mouth every 8 hours as needed for Nausea    ONDANSETRON (ZOFRAN ODT) 8 MG TBDP DISINTEGRATING TABLET    Place 1 tablet under the tongue every 8 hours as needed for Nausea or Vomiting    PANTOPRAZOLE (PROTONIX) 20 MG TABLET    Take 1 tablet by mouth daily for 7 days    PREDNISONE (DELTASONE) 5 MG TABLET    Take 5 mg by mouth daily    QUETIAPINE (SEROQUEL) 100 MG TABLET    Take 1 tablet by mouth nightly    SKIN PROTECTANTS, MISC. (EUCERIN) CREAM    Apply topically as needed. SUCRALFATE (CARAFATE) 1 GM TABLET    Take 1 tablet by mouth 4 times daily    TIZANIDINE (ZANAFLEX) 2 MG TABLET    Take 2 mg by mouth every 8 hours as needed    VITAMIN D (ERGOCALCIFEROL) 99532 UNITS CAPS CAPSULE    Take 1 capsule by mouth once a week       ALLERGIES     Geodon [ziprasidone hcl]; Haldol [haloperidol]; Iv dye [iodides]; Bentyl [dicyclomine hcl]; Dicyclomine;  Famotidine; and Flagyl [metronidazole]    FAMILY HISTORY       Family Status   Relation Status    Father Alive Eyes: Pupils are equal, round, and reactive to light. Conjunctivae and EOM are normal. Right eye exhibits no discharge. Left eye exhibits no discharge. Neck: Normal range of motion. Neck supple. Cardiovascular: Normal rate, regular rhythm, normal heart sounds and intact distal pulses. Pulmonary/Chest: Effort normal and breath sounds normal. No respiratory distress. He has no wheezes. Abdominal: Soft. Bowel sounds are normal. He exhibits no distension. There is tenderness (Mild general with some guarding yet). Musculoskeletal: Normal range of motion. Neurological: He is alert and oriented to person, place, and time. Skin: Skin is warm and dry. Nursing note and vitals reviewed. DIFFERENTIAL DIAGNOSIS/ MDM:     Patient here with abdominal pain for the pastl week. He had before. In addition patient is depressed and suicidal.  Mental screening exam at this time    [phone] [id number]: Patient continues in stable condition. Patient continues to decline all blood work and imaging. At this time patient will be admitted. Patient states he does not have a primary care physician. Patient will be admitted to staff medicine with Dr. Vy Archuleta calling back and accepting the patient for medical observation followed by psychiatric evaluation thereafter.   No further requests except for Bridge orders    DIAGNOSTIC RESULTS     EKG: All EKG's are interpreted by the Emergency Department Physician who either signs or Co-signs this chart in the absence of a cardiologist.  EKG Interpretation    Interpreted by emergency department physician    Rhythm: normal sinus   Rate: normal  Axis: normal  Ectopy: none  Conduction: normal  ST Segments: no acute change  T Waves: no acute change  Q Waves: none    EKG  Impression: no acute changes        RADIOLOGY:   I directly visualized the following  images and reviewed the radiologist interpretations:  CT ABDOMEN PELVIS WO CONTRAST    (Results Pending)          LABS:  Labs Reviewed   CBC

## 2018-09-18 PROCEDURE — 6370000000 HC RX 637 (ALT 250 FOR IP): Performed by: INTERNAL MEDICINE

## 2018-09-18 PROCEDURE — G0378 HOSPITAL OBSERVATION PER HR: HCPCS

## 2018-09-18 PROCEDURE — 6360000002 HC RX W HCPCS: Performed by: INTERNAL MEDICINE

## 2018-09-18 PROCEDURE — 90792 PSYCH DIAG EVAL W/MED SRVCS: CPT | Performed by: NURSE PRACTITIONER

## 2018-09-18 RX ORDER — GABAPENTIN 300 MG/1
600 CAPSULE ORAL 3 TIMES DAILY
Status: CANCELLED | OUTPATIENT
Start: 2018-09-18

## 2018-09-18 RX ORDER — PROMETHAZINE HYDROCHLORIDE 25 MG/1
12.5 TABLET ORAL EVERY 6 HOURS PRN
Status: DISCONTINUED | OUTPATIENT
Start: 2018-09-18 | End: 2018-09-19 | Stop reason: HOSPADM

## 2018-09-18 RX ORDER — DOXYCYCLINE 100 MG/1
100 CAPSULE ORAL DAILY
Status: CANCELLED | OUTPATIENT
Start: 2018-09-19

## 2018-09-18 RX ORDER — ERGOCALCIFEROL 1.25 MG/1
50000 CAPSULE ORAL WEEKLY
Status: CANCELLED | OUTPATIENT
Start: 2018-09-24

## 2018-09-18 RX ORDER — MELOXICAM 15 MG/1
15 TABLET ORAL DAILY
Status: CANCELLED | OUTPATIENT
Start: 2018-09-19

## 2018-09-18 RX ORDER — PANTOPRAZOLE SODIUM 40 MG/1
40 TABLET, DELAYED RELEASE ORAL
Status: CANCELLED | OUTPATIENT
Start: 2018-09-19

## 2018-09-18 RX ORDER — FLUTICASONE FUROATE AND VILANTEROL 200; 25 UG/1; UG/1
1 POWDER RESPIRATORY (INHALATION) DAILY
Status: CANCELLED | OUTPATIENT
Start: 2018-09-19

## 2018-09-18 RX ORDER — ALBUTEROL SULFATE 90 UG/1
2 AEROSOL, METERED RESPIRATORY (INHALATION) EVERY 6 HOURS PRN
Status: CANCELLED | OUTPATIENT
Start: 2018-09-18

## 2018-09-18 RX ORDER — NICOTINE 21 MG/24HR
1 PATCH, TRANSDERMAL 24 HOURS TRANSDERMAL DAILY
Status: CANCELLED | OUTPATIENT
Start: 2018-09-19

## 2018-09-18 RX ORDER — MAGNESIUM HYDROXIDE/ALUMINUM HYDROXICE/SIMETHICONE 120; 1200; 1200 MG/30ML; MG/30ML; MG/30ML
30 SUSPENSION ORAL EVERY 6 HOURS PRN
Status: DISCONTINUED | OUTPATIENT
Start: 2018-09-18 | End: 2018-09-19 | Stop reason: HOSPADM

## 2018-09-18 RX ORDER — ACETAMINOPHEN 325 MG/1
650 TABLET ORAL EVERY 4 HOURS PRN
Status: CANCELLED | OUTPATIENT
Start: 2018-09-18

## 2018-09-18 RX ORDER — QUETIAPINE FUMARATE 100 MG/1
100 TABLET, FILM COATED ORAL NIGHTLY
Status: CANCELLED | OUTPATIENT
Start: 2018-09-18

## 2018-09-18 RX ORDER — PREDNISONE 10 MG/1
5 TABLET ORAL DAILY
Status: CANCELLED | OUTPATIENT
Start: 2018-09-19

## 2018-09-18 RX ORDER — CYCLOBENZAPRINE HCL 10 MG
10 TABLET ORAL 3 TIMES DAILY PRN
Status: CANCELLED | OUTPATIENT
Start: 2018-09-18

## 2018-09-18 RX ORDER — METHADONE HYDROCHLORIDE 10 MG/5ML
87 SOLUTION ORAL DAILY
Status: DISCONTINUED | OUTPATIENT
Start: 2018-09-18 | End: 2018-09-19 | Stop reason: HOSPADM

## 2018-09-18 RX ORDER — TIZANIDINE 2 MG/1
2 TABLET ORAL EVERY 8 HOURS PRN
Status: CANCELLED | OUTPATIENT
Start: 2018-09-18

## 2018-09-18 RX ADMIN — CYCLOBENZAPRINE HYDROCHLORIDE 10 MG: 10 TABLET, FILM COATED ORAL at 22:54

## 2018-09-18 RX ADMIN — PROMETHAZINE HYDROCHLORIDE 12.5 MG: 25 TABLET ORAL at 18:33

## 2018-09-18 RX ADMIN — PROMETHAZINE HYDROCHLORIDE 12.5 MG: 25 TABLET ORAL at 22:54

## 2018-09-18 RX ADMIN — GABAPENTIN 600 MG: 300 CAPSULE ORAL at 14:18

## 2018-09-18 RX ADMIN — GABAPENTIN 600 MG: 300 CAPSULE ORAL at 22:54

## 2018-09-18 RX ADMIN — CYCLOBENZAPRINE HYDROCHLORIDE 10 MG: 10 TABLET, FILM COATED ORAL at 14:18

## 2018-09-18 RX ADMIN — METHADONE HYDROCHLORIDE 87 MG: 10 SOLUTION ORAL at 15:41

## 2018-09-18 RX ADMIN — ALUMINUM HYDROXIDE, MAGNESIUM HYDROXIDE, AND SIMETHICONE 30 ML: 200; 200; 20 SUSPENSION ORAL at 18:34

## 2018-09-18 RX ADMIN — QUETIAPINE FUMARATE 100 MG: 100 TABLET ORAL at 22:54

## 2018-09-18 ASSESSMENT — PAIN SCALES - GENERAL
PAINLEVEL_OUTOF10: 8
PAINLEVEL_OUTOF10: 9
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
## 2018-09-18 NOTE — H&P
(TORADOL) 10 MG tablet Take 1 tablet by mouth every 6 hours as needed for Pain 9/14/18   Carlene Chau, DO   Skin Protectants, Misc. (EUCERIN) cream Apply topically as needed. 8/22/18   Mary Dickerson PA-C   albuterol sulfate HFA (VENTOLIN HFA) 108 (90 Base) MCG/ACT inhaler Inhale 2 puffs into the lungs every 6 hours as needed for Wheezing 8/13/18   Pelon Motnes MD        Allergies:     Geodon [ziprasidone hcl]; Haldol [haloperidol]; Iv dye [iodides]; Bentyl [dicyclomine hcl]; Dicyclomine; Famotidine; and Flagyl [metronidazole]    Social History:     Tobacco:    reports that he has been smoking E-Cigarettes and Cigarettes. He has a 2.50 pack-year smoking history. He has never used smokeless tobacco.  Alcohol:      reports that he does not drink alcohol. Drug Use:  reports that he does not use drugs. Family History:     Family History   Problem Relation Age of Onset    Diabetes Father     Alcohol Abuse Father     Depression Father     Arthritis Father     High Blood Pressure Father     Other Father         aneurysm & epilepsy    Migraines Father     Arthritis Mother     Other Mother         aneurysm & epilepsy    Migraines Mother     Diabetes Brother         Aunt and uncles    Depression Brother     Mental Illness Brother     Other Brother         epilepsy    Migraines Brother     Stroke Other         Uncle    Other Brother         murdered Oct 6th, 2014    Colon Cancer Paternal Cousin 43    Other Sister         epilepsy    Migraines Sister        Review of Systems:     Positive and Negative as described in HPI. CONSTITUTIONAL:  negative for fevers, chills, sweats, fatigue, weight loss  HEENT:  negative for vision, hearing changes, runny nose, throat pain  RESPIRATORY:  negative for shortness of breath, cough, congestion, wheezing. CARDIOVASCULAR:  negative for chest pain, palpitations.   GASTROINTESTINAL:  negative for nausea, vomiting, diarrhea, constipation, change in bowel acetaminophen (TYLENOL) tablet 650 mg  650 mg Oral Q4H PRN Ceci Rubio MD        meloxicam (MOBIC) tablet 15 mg  15 mg Oral Daily Radha Sheikh MD        albuterol sulfate  (90 Base) MCG/ACT inhaler 2 puff  2 puff Inhalation Q6H PRN Herbert Perry MD        Fluticasone Furoate-Vilanterol 200-25 MCG/INH AEPB 1 puff  1 puff Inhalation Daily Radha Sheikh MD        gabapentin (NEURONTIN) capsule 600 mg  600 mg Oral TID Radha Sheikh MD        DULoxetine (CYMBALTA) extended release capsule 90 mg  90 mg Oral Daily Radha Sheikh MD        QUEtiapine (SEROQUEL) tablet 100 mg  100 mg Oral Nightly Radha Sheikh MD        doxycycline monohydrate (MONODOX) capsule 100 mg  100 mg Oral Daily Radha Sheikh MD        vitamin D (ERGOCALCIFEROL) capsule 50,000 Units  50,000 Units Oral Weekly Radha Sheikh MD        tiZANidine (ZANAFLEX) tablet 2 mg  2 mg Oral Q8H PRN Radha Sheikh MD        predniSONE (DELTASONE) tablet 5 mg  5 mg Oral Daily Herbert Perry MD        cyclobenzaprine (FLEXERIL) tablet 10 mg  10 mg Oral TID PRN Radha Sheikh MD        pantoprazole (PROTONIX) tablet 40 mg  40 mg Oral QAM AC Radha Sheikh MD        nicotine (NICODERM CQ) 21 MG/24HR 1 patch  1 patch Transdermal Daily Radha Sheikh MD   1 patch at 09/18/18 1201          Martin Sanchez MD  9/18/2018  12:07 PM

## 2018-09-18 NOTE — DISCHARGE SUMMARY
[facility] SERVICE       Discharge Summary     Patient ID: Dimitri Cerna  :  1990   MRN: [id number]     ACCOUNT:  [de-identified]   Patient's PCP: No primary care provider on file. Admit Date: 2018   Discharge Date: 2018     Length of Stay: 0  Code Status:  Full Code  Admitting Physician: Guru Silver MD  Discharge Physician: Ciera Tiwari MD     Active Discharge Diagnoses:     Hospital Problem Lists:  Active Problems:    Abdominal pain  Resolved Problems:    * No resolved hospital problems. *      Admission Condition:  good     Discharged Condition: good    Hospital Stay:     Hospital Course: Dimitri Cerna is a 32 y.o. male who was admitted for the management of   <principal problem not specified> , presented to ER with Mental Health Problem;  Abdominal Pain; and Chest Pain    Abdominal pain     multiple admissions   all w/u neg on liliane atb and steroids by another er      Non compliance did not f/u for egd / gi     refusing all klabs    w/u    D/c to mpsychiatry  [place] slipped       Significant therapeutic interventions:     Significant Diagnostic Studies:   Labs / Micro:  CBC:   Lab Results   Component Value Date    WBC 6.7 2018    RBC 4.59 2018    RBC 4.20 2018    HGB 13.4 2018    HCT 42.2 2018    MCV 91.9 2018    MCH 29.2 2018    MCHC 31.8 2018    RDW 13.1 2018     2018     BMP:    Lab Results   Component Value Date    GLUCOSE 106 2018    GLUCOSE 99 2018     2018    K 3.5 2018    CL 94 2018    CO2 27 2018    ANIONGAP 9 2018    BUN 8 2018    CREATININE 0.84 2018    BUNCRER NOT REPORTED 2018    CALCIUM 8.6 2018    LABGLOM >60 2018    LABGLOM >90 2016    GFRAA >60 2018    GFR      2018    GFR NOT REPORTED 2018              Radiology:    Ct Abdomen Pelvis Wo Contrast    Result Date: 2018  EXAMINATION: MD  9/18/2018  12:11 PM      Thank you Dr. Flores Lopez primary care provider on file. for the opportunity to be involved in this patient's care.

## 2018-09-18 NOTE — CARE COORDINATION
CASE MANAGEMENT NOTE:    Admission Date:  9/17/2018 Daisy Golden is a 32 y.o.  male    Admitted for : Abdominal pain [R10.9]    Met with:  Patient    PCP:  Stated sees a Psychiatrist at [facility] and needs assistance with establishing PCP                                 Insurance:  James Miguelangelclarissaing[address]      Current Residence/ Living Arrangements:  independently at home             Current Services PTA:  Current with [facility]    Is patient agreeable to VNS: No    Freedom of choice provided: Yes    List of [address] provided: NA    VNS chosen:  NA    DME:  cane    Home Oxygen: No    Nebulizer: No    CPAP/BIPAP: Yes- CPAP    Supplier: N/A    Potential Assistance Needed: Yes    SNF needed: No    Pharmacy:  [facility]        Is the Patient an [employer] with Readmission Risk Score greater than 14%? No  If yes, pt needs a follow up appointment made within 7 days. Does Patient want to use MEDS to BEDS? No    Family Members/Caregivers that pt would like involved in their care:    Yes    If yes, list name here:  [place]    Transportation Provider:  Friends                      Discharge Plan:  9/18/2018 James Miguelangelclarissaol[address]; Patient stated from home with roommate- Independent and friends provide transportation; Stated current at [facility] to see Psychiatrist, go to group and gets methadone; Needs assistance with establishing PCP; DME- cane and CAP; Declined VNS; Admitted with abdominal pain and suicidal ideation- to have CT ABD/PELVIS and psych consulted; 1:1 at bedside; Refusing some treatment; Will continue to follow//SUSHIL               Electronically signed by:  Sven Lake RN on 9/18/2018 at 8:23 AM

## 2018-09-19 ENCOUNTER — HOSPITAL ENCOUNTER (INPATIENT)
Age: 28
LOS: 12 days | Discharge: HOME | DRG: 885 | End: 2018-10-01
Attending: PSYCHIATRY & NEUROLOGY | Admitting: PSYCHIATRY & NEUROLOGY
Payer: MEDICARE

## 2018-09-19 VITALS
DIASTOLIC BLOOD PRESSURE: 73 MMHG | WEIGHT: 240 LBS | SYSTOLIC BLOOD PRESSURE: 140 MMHG | OXYGEN SATURATION: 98 % | HEIGHT: 70 IN | TEMPERATURE: 98.4 F | RESPIRATION RATE: 16 BRPM | BODY MASS INDEX: 34.36 KG/M2 | HEART RATE: 74 BPM

## 2018-09-19 PROBLEM — F31.9 BIPOLAR DISORDER (HCC): Status: ACTIVE | Noted: 2018-09-19

## 2018-09-19 PROCEDURE — 6370000000 HC RX 637 (ALT 250 FOR IP): Performed by: PSYCHIATRY & NEUROLOGY

## 2018-09-19 PROCEDURE — 6370000000 HC RX 637 (ALT 250 FOR IP): Performed by: INTERNAL MEDICINE

## 2018-09-19 PROCEDURE — 99236 HOSP IP/OBS SAME DATE HI 85: CPT | Performed by: INTERNAL MEDICINE

## 2018-09-19 PROCEDURE — 6360000002 HC RX W HCPCS: Performed by: INTERNAL MEDICINE

## 2018-09-19 PROCEDURE — 1240000000 HC EMOTIONAL WELLNESS R&B

## 2018-09-19 PROCEDURE — 99222 1ST HOSP IP/OBS MODERATE 55: CPT | Performed by: INTERNAL MEDICINE

## 2018-09-19 PROCEDURE — 2580000003 HC RX 258: Performed by: INTERNAL MEDICINE

## 2018-09-19 PROCEDURE — G0378 HOSPITAL OBSERVATION PER HR: HCPCS

## 2018-09-19 RX ORDER — PANTOPRAZOLE SODIUM 40 MG/1
40 TABLET, DELAYED RELEASE ORAL
Status: DISCONTINUED | OUTPATIENT
Start: 2018-09-20 | End: 2018-10-01 | Stop reason: HOSPADM

## 2018-09-19 RX ORDER — ALBUTEROL SULFATE 90 UG/1
2 AEROSOL, METERED RESPIRATORY (INHALATION) EVERY 6 HOURS PRN
Status: DISCONTINUED | OUTPATIENT
Start: 2018-09-19 | End: 2018-09-19

## 2018-09-19 RX ORDER — PREDNISONE 10 MG/1
5 TABLET ORAL DAILY
Status: DISCONTINUED | OUTPATIENT
Start: 2018-09-20 | End: 2018-10-01 | Stop reason: HOSPADM

## 2018-09-19 RX ORDER — PANTOPRAZOLE SODIUM 40 MG/1
40 TABLET, DELAYED RELEASE ORAL
Status: DISCONTINUED | OUTPATIENT
Start: 2018-09-20 | End: 2018-09-19

## 2018-09-19 RX ORDER — ACETAMINOPHEN 325 MG/1
650 TABLET ORAL EVERY 4 HOURS PRN
Status: DISCONTINUED | OUTPATIENT
Start: 2018-09-19 | End: 2018-09-20

## 2018-09-19 RX ORDER — ACETAMINOPHEN 325 MG/1
650 TABLET ORAL EVERY 6 HOURS PRN
Status: DISCONTINUED | OUTPATIENT
Start: 2018-09-19 | End: 2018-09-19

## 2018-09-19 RX ORDER — DOXYCYCLINE 100 MG/1
100 CAPSULE ORAL DAILY
Status: DISCONTINUED | OUTPATIENT
Start: 2018-09-20 | End: 2018-10-01 | Stop reason: HOSPADM

## 2018-09-19 RX ORDER — ERGOCALCIFEROL 1.25 MG/1
50000 CAPSULE ORAL WEEKLY
Status: DISCONTINUED | OUTPATIENT
Start: 2018-09-24 | End: 2018-09-19

## 2018-09-19 RX ORDER — CYCLOBENZAPRINE HCL 10 MG
10 TABLET ORAL 3 TIMES DAILY PRN
Status: DISCONTINUED | OUTPATIENT
Start: 2018-09-19 | End: 2018-10-01 | Stop reason: HOSPADM

## 2018-09-19 RX ORDER — LANOLIN ALCOHOL/MO/W.PET/CERES
CREAM (GRAM) TOPICAL PRN
Status: DISCONTINUED | OUTPATIENT
Start: 2018-09-19 | End: 2018-10-01 | Stop reason: HOSPADM

## 2018-09-19 RX ORDER — NICOTINE 21 MG/24HR
1 PATCH, TRANSDERMAL 24 HOURS TRANSDERMAL DAILY
Status: DISCONTINUED | OUTPATIENT
Start: 2018-09-20 | End: 2018-10-01 | Stop reason: HOSPADM

## 2018-09-19 RX ORDER — QUETIAPINE FUMARATE 100 MG/1
100 TABLET, FILM COATED ORAL NIGHTLY
Status: DISCONTINUED | OUTPATIENT
Start: 2018-09-19 | End: 2018-09-19

## 2018-09-19 RX ORDER — GABAPENTIN 300 MG/1
600 CAPSULE ORAL 3 TIMES DAILY
Status: DISCONTINUED | OUTPATIENT
Start: 2018-09-19 | End: 2018-09-19

## 2018-09-19 RX ORDER — FLUTICASONE FUROATE AND VILANTEROL 200; 25 UG/1; UG/1
1 POWDER RESPIRATORY (INHALATION) DAILY
Status: DISCONTINUED | OUTPATIENT
Start: 2018-09-20 | End: 2018-09-19

## 2018-09-19 RX ORDER — TIZANIDINE 4 MG/1
2 TABLET ORAL EVERY 8 HOURS PRN
Status: DISCONTINUED | OUTPATIENT
Start: 2018-09-19 | End: 2018-09-19

## 2018-09-19 RX ORDER — GABAPENTIN 300 MG/1
300 CAPSULE ORAL 3 TIMES DAILY
Status: DISCONTINUED | OUTPATIENT
Start: 2018-09-19 | End: 2018-10-01 | Stop reason: HOSPADM

## 2018-09-19 RX ORDER — QUETIAPINE FUMARATE 100 MG/1
100 TABLET, FILM COATED ORAL NIGHTLY
Status: DISCONTINUED | OUTPATIENT
Start: 2018-09-19 | End: 2018-10-01 | Stop reason: HOSPADM

## 2018-09-19 RX ORDER — ARIPIPRAZOLE 10 MG/1
10 TABLET ORAL DAILY
Status: DISCONTINUED | OUTPATIENT
Start: 2018-09-20 | End: 2018-09-24

## 2018-09-19 RX ORDER — MELOXICAM 7.5 MG/1
15 TABLET ORAL DAILY
Status: DISCONTINUED | OUTPATIENT
Start: 2018-09-20 | End: 2018-09-19

## 2018-09-19 RX ORDER — CYCLOBENZAPRINE HCL 10 MG
10 TABLET ORAL 2 TIMES DAILY PRN
Status: DISCONTINUED | OUTPATIENT
Start: 2018-09-19 | End: 2018-09-19

## 2018-09-19 RX ORDER — PREDNISONE 10 MG/1
5 TABLET ORAL DAILY
Status: DISCONTINUED | OUTPATIENT
Start: 2018-09-20 | End: 2018-09-19

## 2018-09-19 RX ORDER — DOXYCYCLINE 100 MG/1
100 CAPSULE ORAL DAILY
Status: DISCONTINUED | OUTPATIENT
Start: 2018-09-20 | End: 2018-09-19

## 2018-09-19 RX ORDER — ALBUTEROL SULFATE 90 UG/1
2 AEROSOL, METERED RESPIRATORY (INHALATION) EVERY 6 HOURS PRN
Status: DISCONTINUED | OUTPATIENT
Start: 2018-09-19 | End: 2018-10-01 | Stop reason: HOSPADM

## 2018-09-19 RX ORDER — KETOROLAC TROMETHAMINE 10 MG/1
10 TABLET, FILM COATED ORAL 2 TIMES DAILY PRN
Status: DISPENSED | OUTPATIENT
Start: 2018-09-19 | End: 2018-09-24

## 2018-09-19 RX ORDER — FLUTICASONE FUROATE AND VILANTEROL 200; 25 UG/1; UG/1
1 POWDER RESPIRATORY (INHALATION) DAILY
Status: DISCONTINUED | OUTPATIENT
Start: 2018-09-20 | End: 2018-09-27 | Stop reason: CLARIF

## 2018-09-19 RX ADMIN — GABAPENTIN 600 MG: 300 CAPSULE ORAL at 15:47

## 2018-09-19 RX ADMIN — QUETIAPINE FUMARATE 100 MG: 100 TABLET ORAL at 23:42

## 2018-09-19 RX ADMIN — METHADONE HYDROCHLORIDE 87 MG: 10 SOLUTION ORAL at 09:35

## 2018-09-19 RX ADMIN — GABAPENTIN 600 MG: 300 CAPSULE ORAL at 09:35

## 2018-09-19 RX ADMIN — CYCLOBENZAPRINE HYDROCHLORIDE 10 MG: 10 TABLET, FILM COATED ORAL at 23:42

## 2018-09-19 RX ADMIN — CYCLOBENZAPRINE HYDROCHLORIDE 10 MG: 10 TABLET, FILM COATED ORAL at 15:49

## 2018-09-19 RX ADMIN — CYCLOBENZAPRINE HYDROCHLORIDE 10 MG: 10 TABLET, FILM COATED ORAL at 10:58

## 2018-09-19 RX ADMIN — Medication 10 ML: at 10:00

## 2018-09-19 RX ADMIN — GABAPENTIN 300 MG: 300 CAPSULE ORAL at 23:42

## 2018-09-19 RX ADMIN — PROMETHAZINE HYDROCHLORIDE 12.5 MG: 25 TABLET ORAL at 10:58

## 2018-09-19 ASSESSMENT — LIFESTYLE VARIABLES: HISTORY_ALCOHOL_USE: COMMENT

## 2018-09-19 ASSESSMENT — PAIN SCALES - GENERAL
PAINLEVEL_OUTOF10: 8
PAINLEVEL_OUTOF10: 0
PAINLEVEL_OUTOF10: 6
PAINLEVEL_OUTOF10: 0
PAINLEVEL_OUTOF10: 0

## 2018-09-19 NOTE — FLOWSHEET NOTE
Pt discharged to [facility] accompanied by 2 [facility] staff. Patients went willingly and all belongings sent with him.

## 2018-09-19 NOTE — PLAN OF CARE
Problem: Falls - Risk of:  Goal: Will remain free from falls  Will remain free from falls   Outcome: Ongoing  Pt alert able to verbalize his needs.  Call light within reach remains one on one    Problem: SAFETY  Goal: Free from accidental physical injury  Outcome: Ongoing  No accidental or intentional harm occured    Problem: DAILY CARE  Goal: Daily care needs are met  Outcome: Ongoing  Pt able to verbalize his needs ,daily care met    Problem: Suicide risk  Goal: Provide patient with safe environment  Provide patient with safe environment   Outcome: Ongoing  Remains on suicide precautions,one on one    Problem: Pain:  Goal: Pain level will decrease  Pain level will decrease   Outcome: Ongoing  Medicated as ordered with relief

## 2018-09-19 NOTE — PROGRESS NOTES
Dr. Osei Peterson rounds, plan for EGD tomorrow. NPO after midnight. GI issues are not new, pt see's Dr. Carlota Villar and was to have EGD outpatient.   Patient would like to do this while he is here in the hospital.

## 2018-09-19 NOTE — DISCHARGE SUMMARY
8/22/2018  EXAMINATION: CT OF THE ABDOMEN AND PELVIS WITHOUT CONTRAST 8/22/2018 3:50 pm TECHNIQUE: CT of the abdomen and pelvis was performed without the administration of intravenous contrast. Multiplanar reformatted images are provided for review. Dose modulation, iterative reconstruction, and/or weight based adjustment of the mA/kV was utilized to reduce the radiation dose to as low as reasonably achievable. COMPARISON: CT abdomen and pelvis June 28, 2018. HISTORY: ORDERING SYSTEM PROVIDED HISTORY: diffuse abdominal pain  contrast allergy Pt is well known to hospital for abd pain/N/V. Pt sts he was \"just here for this except now my junk burns. \" pt sts \"I was suppose to see a GI doctor for a scope but I didn't go\". Pt sts \"I need something for my pain\". Pt sts \"my junk burn a whole lost for the last 3 days\". Pt denied discharge. Pt also sts \"I was also just here (in the ED) but they didn't give me anything for my pain\". FINDINGS: Lower Chest: Mild dependent atelectatic changes. No pericardial or pleural effusions. Organs: Suboptimal evaluation due to lack of IV contrast.  Diffuse hepatic steatosis is noted. Gallbladder appears to have been removed. Spleen, pancreas and adrenal glands all appear unremarkable. Kidneys demonstrate no evidence of stone or hydronephrosis. The abdominal aorta appears normal in caliber. GI/Bowel: Stomach is grossly unremarkable. Small bowel appears nondilated. Moderate amount stool burden is seen particularly within the left colon. No acute colonic abnormality is seen. Appendix is normal. Pelvis: Urinary bladder is grossly unremarkable. Prostate gland is normal in size. Peritoneum/Retroperitoneum: No free air, free fluid or lymphadenopathy. Bones/Soft Tissues: Abdominal wall demonstrates no acute findings. Osseous structures are grossly unremarkable. 1. No acute intra-abdominal process. 2. Diffuse hepatic steatosis.      Xr Chest Standard (2 Vw)    Result Date: Medication List      STOP taking these medications    pantoprazole 20 MG tablet  Commonly known as:  PROTONIX        ASK your doctor about these medications    acetaminophen 325 MG tablet  Commonly known as:  TYLENOL  Take 2 tablets by mouth every 6 hours as needed for Pain     albuterol sulfate  (90 Base) MCG/ACT inhaler  Commonly known as:  VENTOLIN HFA  Inhale 2 puffs into the lungs every 6 hours as needed for Wheezing     ARIPiprazole 10 MG tablet  Commonly known as:  ABILIFY  Take 1 tablet by mouth daily     cyclobenzaprine 10 MG tablet  Commonly known as:  FLEXERIL     doxycycline monohydrate 100 MG capsule  Commonly known as:  MONODOX  Take 1 capsule by mouth daily     DULoxetine 30 MG extended release capsule  Commonly known as:  CYMBALTA  Take 3 capsules by mouth daily     eucerin cream  Apply topically as needed. Fluticasone Furoate-Vilanterol 200-25 MCG/INH Aepb  Commonly known as:  BREO ELLIPTA  Inhale 1 puff into the lungs daily     gabapentin 300 MG capsule  Commonly known as:  NEURONTIN  Take 1 capsule by mouth 3 times daily for 30 days. Mordecai Craig ketorolac 10 MG tablet  Commonly known as:  TORADOL  Take 1 tablet by mouth every 6 hours as needed for Pain     meloxicam 7.5 MG tablet  Commonly known as:  MOBIC  Take 1 tablet by mouth daily     methadone 10 MG/5ML solution     NEXIUM 40 MG delayed release capsule  Generic drug:  esomeprazole     predniSONE 5 MG tablet  Commonly known as:  DELTASONE     QUEtiapine 100 MG tablet  Commonly known as:  SEROQUEL  Take 1 tablet by mouth nightly     tiZANidine 2 MG tablet  Commonly known as:  ZANAFLEX     vitamin D 90822 units Caps capsule  Commonly known as:  ERGOCALCIFEROL  Take 1 capsule by mouth once a week            Time Spent on discharge is 30 to 74 minutes in patient examination, evaluation, counseling as well as medication reconciliation, prescriptions for required medications, discharge plan and follow up.     Electronically signed by Cecil Em MD  9/19/2018  11:00 AM      Thank you Dr. Catherine Ibrahim primary care provider on file. for the opportunity to be involved in this patient's care.

## 2018-09-19 NOTE — FLOWSHEET NOTE
Writer spoke with Betty in [facility] and she finished taking report on this patient. Will be over in approximately 10 minutes to  patient.

## 2018-09-19 NOTE — PLAN OF CARE
Problem: Falls - Risk of:  Goal: Will remain free from falls  Will remain free from falls   Outcome: Ongoing    Goal: Absence of physical injury  Absence of physical injury   Outcome: Ongoing      Problem: SAFETY  Goal: Free from accidental physical injury  Outcome: Ongoing    Goal: Free from intentional harm  Outcome: Ongoing      Problem: DAILY CARE  Goal: Daily care needs are met  Outcome: Ongoing      Problem: PAIN  Goal: Patient's pain/discomfort is manageable  Outcome: Ongoing      Problem: Suicide risk  Goal: Provide patient with safe environment  Provide patient with safe environment   Outcome: Ongoing      Problem: Pain:  Goal: Pain level will decrease  Pain level will decrease   Outcome: Ongoing    Goal: Control of acute pain  Control of acute pain   Outcome: Ongoing    Goal: Control of chronic pain  Control of chronic pain   Outcome: Ongoing

## 2018-09-19 NOTE — FLOWSHEET NOTE
Writer called back to [facility] to find out whether or not they will finish taking report on this patient. Writer informed that they are waiting for a return call from Dr Jorge Eckert.

## 2018-09-19 NOTE — CONSULTS
Geodon [ziprasidone hcl]; Haldol [haloperidol]; Iv dye [iodides]; Bentyl [dicyclomine hcl]; Dicyclomine; Famotidine; and Flagyl [metronidazole]    Social History:     Tobacco:    reports that he has been smoking E-Cigarettes and Cigarettes. He has a 2.50 pack-year smoking history. He has never used smokeless tobacco.  Alcohol:      reports that he does not drink alcohol. Drug Use:  reports that he does not use drugs.     Family History:     Family History   Problem Relation Age of Onset    Diabetes Father     Alcohol Abuse Father     Depression Father     Arthritis Father     High Blood Pressure Father     Other Father         aneurysm & epilepsy    Migraines Father     Arthritis Mother     Other Mother         aneurysm & epilepsy    Migraines Mother     Diabetes Brother         Aunt and uncles    Depression Brother     Mental Illness Brother     Other Brother         epilepsy    Migraines Brother     Stroke Other         Uncle    Other Brother         murdered Oct 6th, 2014    Colon Cancer Paternal Cousin 43    Other Sister         epilepsy   [employer] Migraines Sister        Review of Systems:     Positive and Negative as described in HPI    Constitutional:  negative for  fevers, chills, sweats, + fatigue, and weight loss  HEENT:  negative for vision or hearing changes,   Respiratory:  negative for shortness of breath, cough, or congestion  Cardiovascular:  negative for  chest pain, palpitations  Gastrointestinal:  + nausea, vomiting, diarrhea, constipation, + abdominal pain  Genitourinary:  negative for frequency, dysuria  Integument:  +  rash, skin lesions  Musculoskeletal:  + muscle aches or joint pain  Neurological: + headaches, dizziness, lightheadedness, numbness, pain and tingling extrimities  Behavior/Psych:  + depression and anxiety    Code Status:  Full Code    Physical Exam:     Vitals:  BP (!) 142/74   Pulse 68   Temp 97.7 °F (36.5 °C) (Oral)   Resp 16   Ht 5' 10\" (1.778 m)   Wt 0.9 02/26/2018    LYMPHSABS 1.04 09/10/2018    LYMPHSABS 2.6 02/26/2018    EOSABS <0.03 09/10/2018    EOSABS 0.0 02/26/2018    BASOSABS <0.03 09/10/2018    DIFFTYPE NOT REPORTED 09/10/2018     Hemoglobin/Hematocrit:    Lab Results   Component Value Date    HGB 13.4 09/14/2018    HCT 42.2 09/14/2018     CMP:    Lab Results   Component Value Date     09/14/2018    K 3.5 09/14/2018    CL 94 09/14/2018    CO2 27 09/14/2018    BUN 8 09/14/2018    CREATININE 0.84 09/14/2018    GFRAA >60 09/14/2018    LABGLOM >60 09/14/2018    LABGLOM >90 01/04/2016    GLUCOSE 106 09/14/2018    GLUCOSE 99 02/27/2018    PROT 7.2 09/14/2018    PROT 7.5 02/27/2018    LABALBU 4.1 09/14/2018    LABALBU 4.3 02/27/2018    CALCIUM 8.6 09/14/2018    BILITOT 0.26 09/14/2018    ALKPHOS 69 09/14/2018    AST 25 09/14/2018    ALT 21 09/14/2018     BMP:    Lab Results   Component Value Date     09/14/2018    K 3.5 09/14/2018    CL 94 09/14/2018    CO2 27 09/14/2018    BUN 8 09/14/2018    LABALBU 4.1 09/14/2018    LABALBU 4.3 02/27/2018    CREATININE 0.84 09/14/2018    CALCIUM 8.6 09/14/2018    GFRAA >60 09/14/2018    LABGLOM >60 09/14/2018    LABGLOM >90 01/04/2016    GLUCOSE 106 09/14/2018    GLUCOSE 99 02/27/2018     PT/INR:    Lab Results   Component Value Date    PROTIME 10.9 05/09/2018    INR 1.0 05/09/2018     PTT:    Lab Results   Component Value Date    APTT 24.8 05/09/2018   [APTT}    Assesment:     Primary Problem  <principal problem not specified>    Active Hospital Problems    Diagnosis Date Noted    Suicidal ideation [R45.851]     Abdominal pain [R10.9] 09/17/2018     Abdominal Pains  Nausea and vomiting  Rectal bleeding? Melena? ? Hx of hematemesis  Hx of PUD   IBS  Anxiety issues     Plan:     1. PPI  2. Plan EGD To evaluate and r/o any PUD  3. Probiotics  Pt was advised in detail about some life style and dietary modifications. He was advised about avoidance of caffeine, nicotine and chocolate.  Pt was also told to stay away from any kind of fast foods, soda pops. He was also advised to avoid lots of spices, grease and fried food etc.     Instructions were also given about trying to arrange the timing, quality and quantity of food. Instructions were given about using ample amount of fiber including dietary and supplemental fiber either metamucil, bennafiber or citrucell etc.  Pt was advised about drinking ample amount of water without any colors or chemicals. Stress was given about regular exercise. Pt has verbalized understanding and agreement to these modifications.  nursing staff      Thank you for allowing me to participate in the care of your patient. Please feel free to contact me with any questions or concerns. Electronically signed by Lillian Perez MD on 9/19/2018 at 5:26 PM     Copy sent to Dr. Dave primary care provider on file.

## 2018-09-20 ENCOUNTER — ANESTHESIA EVENT (OUTPATIENT)
Dept: OPERATING ROOM | Age: 28
DRG: 885 | End: 2018-09-20
Payer: MEDICARE

## 2018-09-20 ENCOUNTER — ANESTHESIA (OUTPATIENT)
Dept: OPERATING ROOM | Age: 28
DRG: 885 | End: 2018-09-20
Payer: MEDICARE

## 2018-09-20 VITALS
OXYGEN SATURATION: 96 % | RESPIRATION RATE: 12 BRPM | DIASTOLIC BLOOD PRESSURE: 80 MMHG | SYSTOLIC BLOOD PRESSURE: 131 MMHG

## 2018-09-20 PROCEDURE — 88305 TISSUE EXAM BY PATHOLOGIST: CPT

## 2018-09-20 PROCEDURE — 6370000000 HC RX 637 (ALT 250 FOR IP): Performed by: INTERNAL MEDICINE

## 2018-09-20 PROCEDURE — 6360000002 HC RX W HCPCS: Performed by: NURSE ANESTHETIST, CERTIFIED REGISTERED

## 2018-09-20 PROCEDURE — 2580000003 HC RX 258: Performed by: ANESTHESIOLOGY

## 2018-09-20 PROCEDURE — 43239 EGD BIOPSY SINGLE/MULTIPLE: CPT | Performed by: INTERNAL MEDICINE

## 2018-09-20 PROCEDURE — 6370000000 HC RX 637 (ALT 250 FOR IP): Performed by: PSYCHIATRY & NEUROLOGY

## 2018-09-20 PROCEDURE — 3700000001 HC ADD 15 MINUTES (ANESTHESIA): Performed by: INTERNAL MEDICINE

## 2018-09-20 PROCEDURE — 3700000000 HC ANESTHESIA ATTENDED CARE: Performed by: INTERNAL MEDICINE

## 2018-09-20 PROCEDURE — 3609012400 HC EGD TRANSORAL BIOPSY SINGLE/MULTIPLE: Performed by: INTERNAL MEDICINE

## 2018-09-20 PROCEDURE — 2500000003 HC RX 250 WO HCPCS: Performed by: NURSE ANESTHETIST, CERTIFIED REGISTERED

## 2018-09-20 PROCEDURE — 88342 IMHCHEM/IMCYTCHM 1ST ANTB: CPT

## 2018-09-20 PROCEDURE — 7100000000 HC PACU RECOVERY - FIRST 15 MIN: Performed by: INTERNAL MEDICINE

## 2018-09-20 PROCEDURE — 0DB78ZX EXCISION OF STOMACH, PYLORUS, VIA NATURAL OR ARTIFICIAL OPENING ENDOSCOPIC, DIAGNOSTIC: ICD-10-PCS | Performed by: INTERNAL MEDICINE

## 2018-09-20 PROCEDURE — 7100000001 HC PACU RECOVERY - ADDTL 15 MIN: Performed by: INTERNAL MEDICINE

## 2018-09-20 PROCEDURE — 2709999900 HC NON-CHARGEABLE SUPPLY: Performed by: INTERNAL MEDICINE

## 2018-09-20 PROCEDURE — 1240000000 HC EMOTIONAL WELLNESS R&B

## 2018-09-20 RX ORDER — MIDAZOLAM HYDROCHLORIDE 1 MG/ML
INJECTION INTRAMUSCULAR; INTRAVENOUS PRN
Status: DISCONTINUED | OUTPATIENT
Start: 2018-09-20 | End: 2018-09-20 | Stop reason: SDUPTHER

## 2018-09-20 RX ORDER — KETAMINE HYDROCHLORIDE 50 MG/ML
INJECTION, SOLUTION, CONCENTRATE INTRAMUSCULAR; INTRAVENOUS PRN
Status: DISCONTINUED | OUTPATIENT
Start: 2018-09-20 | End: 2018-09-20 | Stop reason: SDUPTHER

## 2018-09-20 RX ORDER — SODIUM CHLORIDE, SODIUM LACTATE, POTASSIUM CHLORIDE, CALCIUM CHLORIDE 600; 310; 30; 20 MG/100ML; MG/100ML; MG/100ML; MG/100ML
INJECTION, SOLUTION INTRAVENOUS CONTINUOUS
Status: DISCONTINUED | OUTPATIENT
Start: 2018-09-20 | End: 2018-09-20

## 2018-09-20 RX ORDER — LIDOCAINE HYDROCHLORIDE 10 MG/ML
INJECTION, SOLUTION EPIDURAL; INFILTRATION; INTRACAUDAL; PERINEURAL PRN
Status: DISCONTINUED | OUTPATIENT
Start: 2018-09-20 | End: 2018-09-20 | Stop reason: SDUPTHER

## 2018-09-20 RX ORDER — PROPOFOL 10 MG/ML
INJECTION, EMULSION INTRAVENOUS CONTINUOUS PRN
Status: DISCONTINUED | OUTPATIENT
Start: 2018-09-20 | End: 2018-09-20 | Stop reason: SDUPTHER

## 2018-09-20 RX ORDER — ACETAMINOPHEN 325 MG/1
650 TABLET ORAL EVERY 6 HOURS PRN
Status: DISCONTINUED | OUTPATIENT
Start: 2018-09-20 | End: 2018-10-01 | Stop reason: HOSPADM

## 2018-09-20 RX ADMIN — MIDAZOLAM 2 MG: 1 INJECTION INTRAMUSCULAR; INTRAVENOUS at 13:10

## 2018-09-20 RX ADMIN — KETAMINE HYDROCHLORIDE 40 MG: 50 INJECTION, SOLUTION INTRAMUSCULAR; INTRAVENOUS at 13:09

## 2018-09-20 RX ADMIN — DOXYCYCLINE 100 MG: 100 CAPSULE ORAL at 17:33

## 2018-09-20 RX ADMIN — SODIUM CHLORIDE, POTASSIUM CHLORIDE, SODIUM LACTATE AND CALCIUM CHLORIDE: 600; 310; 30; 20 INJECTION, SOLUTION INTRAVENOUS at 13:03

## 2018-09-20 RX ADMIN — PROPOFOL 150 MCG/KG/MIN: 10 INJECTION, EMULSION INTRAVENOUS at 13:09

## 2018-09-20 RX ADMIN — SODIUM CHLORIDE, POTASSIUM CHLORIDE, SODIUM LACTATE AND CALCIUM CHLORIDE: 600; 310; 30; 20 INJECTION, SOLUTION INTRAVENOUS at 12:00

## 2018-09-20 RX ADMIN — MIDAZOLAM 2 MG: 1 INJECTION INTRAMUSCULAR; INTRAVENOUS at 13:03

## 2018-09-20 RX ADMIN — LIDOCAINE HYDROCHLORIDE 50 MG: 10 INJECTION, SOLUTION EPIDURAL; INFILTRATION; INTRACAUDAL; PERINEURAL at 13:09

## 2018-09-20 ASSESSMENT — LIFESTYLE VARIABLES
HISTORY_ALCOHOL_USE: NO
SMOKING_STATUS: 1

## 2018-09-20 ASSESSMENT — PULMONARY FUNCTION TESTS
PIF_VALUE: 0
PIF_VALUE: 0
PIF_VALUE: 1
PIF_VALUE: 0
PIF_VALUE: 1
PIF_VALUE: 0
PIF_VALUE: 1
PIF_VALUE: 0

## 2018-09-20 ASSESSMENT — COPD QUESTIONNAIRES: CAT_SEVERITY: NO INTERVAL CHANGE

## 2018-09-20 ASSESSMENT — PAIN SCALES - GENERAL
PAINLEVEL_OUTOF10: 0
PAINLEVEL_OUTOF10: 10
PAINLEVEL_OUTOF10: 7

## 2018-09-20 NOTE — OP NOTE
PROCEDURE NOTE    DATE OF PROCEDURE: 9/20/2018     SURGEON: Pj Hernandez MD    ASSISTANT: None    PREOPERATIVE DIAGNOSIS: ABD PAINS  NAUSEA AND VOMITING  ? HEMATEMESIS   HX OF PUD? POSTOPERATIVE DIAGNOSIS: As described below    OPERATION: Upper GI endoscopy with Biopsy    ANESTHESIA: Moderate Sedation     ESTIMATED BLOOD LOSS: Less than 50 ml    COMPLICATIONS: None. SPECIMENS:  Was Obtained:     HISTORY: The patient is a 32y.o. year old male with history of above preop diagnosis. I recommended esophagogastroduodenoscopy with possible biopsy and I explained the risk, benefits, expected outcome, and alternatives to the procedure. Risks included but are not limited to bleeding, infection, respiratory distress, hypotension, and perforation of the esophagus, stomach, or duodenum. Patient understands and is in agreement. PROCEDURE: The patient was given IV conscious sedation. The patient's SPO2 remained above 90% throughout the procedure. The gastroscope was inserted orally and advanced under direct vision through the esophagus, through the stomach, through the pylorus, and into the descending duodenum. Findings:    Retropharyngeal area was grossly normal appearing    Esophagus: normal    Stomach:    Fundus: normal    Body: normal    Antrum: abnormal: MILD TO MOD GASTRITIS WAS BIOPSIED    Duodenum:     Descending: normal    Bulb: normal    The scope was removed and the patient tolerated the procedure well. Recommendations/Plan:   1. F/U Biopsies  2. SOFT DIET  3. PPI   4.  Post sedation patient was stable with stable vital signs and stable O2 saturations    Electronically signed by Pj Hernandez MD  on 9/20/2018 at 1:22 PM

## 2018-09-20 NOTE — ANESTHESIA PRE PROCEDURE
mg  40 mg Oral QAM AC Jared Mariscal MD   Stopped at 09/20/18 1036    [MAR Hold] nicotine (NICODERM CQ) 21 MG/24HR 1 patch  1 patch Transdermal Daily Jared Mariscal MD           Allergies: Allergies   Allergen Reactions    Geodon [Ziprasidone Hcl] Anaphylaxis    Haldol [Haloperidol] Anaphylaxis    Iv Dye [Iodides] Nausea And Vomiting     Needs benadryl.     Bentyl [Dicyclomine Hcl]      08--allergy removed-pt sts today it is his allergy    Dicyclomine     Famotidine     Flagyl [Metronidazole] Swelling     Patient says he isn't allergic to this med 08-  Today 08- it is an allergy       Problem List:    Patient Active Problem List   Diagnosis Code    Opioid abuse F11.10    Noncompliance Z91.19    Rectal bleeding K62.5    Smoker F17.200    Bipolar I disorder, most recent episode depressed ([facility] F31.30    Juvenile rheumatoid arthritis ([facility] M08.00    SRIRAM (obstructive sleep apnea) G47.33    Primary insomnia F51.01    Calculus of bile duct with acute on chronic cholecystitis K80.46    Mild intermittent asthma without complication R03.14    Recurrent major depressive disorder (HCC) F33.9    Gastritis K29.70    Generalized abdominal pain R10.84    Anemia D64.9    Elevated liver enzymes R74.8    Nausea and vomiting R11.2    Opioid type dependence, continuous ([facility] F11.20    Abdominal pain R10.9    Suicidal ideation R45.851    Diarrhea R19.7    Schizoaffective disorder, depressive type ([address] F25.1    Irritable bowel syndrome with both constipation and diarrhea K58.2    Bipolar disorder ([facility] F31.9       Past Medical History:        Diagnosis Date    Anxiety     Arthritis     Asthma     Bipolar I disorder, most recent episode (or current) depressed, unspecified 9/12/2014    Clostridium difficile infection     COPD (chronic obstructive pulmonary disease) ([address]     Depression     Disease of blood and blood forming organ     Eczema     Fracture, metacarpal     R 4th and 5th    Gastric ulcer     Gastritis 06/13/2018    GERD (gastroesophageal reflux disease)     GI bleed     H. 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[facility]     Substance abuse     Suicidal ideation     [facility]     Tobacco dependence     Ulcerative colitis ([facility]     UTI (urinary tract infection)        Past Surgical History:        Procedure Laterality Date    ABDOMEN SURGERY      upper GI scope 7/7/2015    BRONCHOSCOPY      CHOLECYSTECTOMY, LAPAROSCOPIC  07/14/2017    surgery performed at [address]      lumbar puncture    AK COLONOSCOPY W/BIOPSY SINGLE/MULTIPLE  8/9/2017    COLONOSCOPY WITH BIOPSY performed by Ginger Barnard MD at [facility]    [place] EGD TRANSORAL BIOPSY SINGLE/MULTIPLE N/A 3/20/2017    EGD BIOPSY performed by Mary Lopez MD at [facility]    [place] ESOPHAGOGASTRODUODENOSCOPY TRANSORAL DIAGNOSTIC N/A 8/9/2017    EGD ESOPHAGOGASTRODUODENOSCOPY performed by Ginger Barnard MD at [address] [place] [address] N/A 3/20/2017    SIGMOIDOSCOPY DIAGNOSTIC FLEXIBLE performed by Mary Lopez MD at [facility]   [address] [place] [address] Drive  2/4/16    UPPER GASTROINTESTINAL ENDOSCOPY N/A 6/13/2018    GASTRITIS       Social History:    Social History   Substance Use Topics    Smoking status: Current Every Day Smoker     Packs/day: 0.50     Years: 5.00     Types: E-Cigarettes, Cigarettes    Smokeless tobacco: Never Used    Alcohol use No                                Ready to quit: Not Answered  Counseling given: Not Answered      Vital Signs (Current):   Vitals:    09/19/18 1925 09/20/18 0845 09/20/18 1142   BP: 138/73 118/60 125/79   Pulse: 76 67 71   Resp: 14 14 16   Temp: 98.3 °F (36.8 °C)  97.2 °F (36.2 °C)   TempSrc: Oral  Oral   Weight: 240 lb (108.9 kg)     Height: 5' 10\" (1.778 m)                                                BP Readings from Last 3 Encounters:   09/20/18 125/79   09/19/18 (!) 140/73   09/17/18 113/80       NPO Status:                                                                                 BMI:   Wt Readings from Last 3 Encounters:   09/19/18 240 lb (108.9 kg)   09/17/18 240 lb (108.9 kg)   09/17/18 232 lb (105.2 kg)     Body mass index is 34.44 kg/m². CBC:   Lab Results   Component Value Date    WBC 6.7 09/14/2018    RBC 4.59 09/14/2018    RBC 4.20 02/26/2018    HGB 13.4 09/14/2018    HCT 42.2 09/14/2018    MCV 91.9 09/14/2018    RDW 13.1 09/14/2018     09/14/2018       CMP:   Lab Results   Component Value Date     09/14/2018    K 3.5 09/14/2018    CL 94 09/14/2018    CO2 27 09/14/2018    BUN 8 09/14/2018    CREATININE 0.84 09/14/2018    GFRAA >60 09/14/2018    LABGLOM >60 09/14/2018    LABGLOM >90 01/04/2016    GLUCOSE 106 09/14/2018    GLUCOSE 99 02/27/2018    PROT 7.2 09/14/2018    PROT 7.5 02/27/2018    CALCIUM 8.6 09/14/2018    BILITOT 0.26 09/14/2018    ALKPHOS 69 09/14/2018    AST 25 09/14/2018    ALT 21 09/14/2018       POC Tests: No results for input(s): POCGLU, POCNA, POCK, POCCL, POCBUN, POCHEMO, POCHCT in the last 72 hours.     Coags:   Lab Results   Component Value Date    PROTIME 10.9 05/09/2018    INR 1.0 05/09/2018    APTT 24.8 05/09/2018       HCG (If Applicable): No results found for: PREGTESTUR, PREGSERUM, HCG, HCGQUANT     ABGs: No results found for: PHART, PO2ART, RUA1SFM, PFD5TJV, BEART, A7MEQPOM     Type & Screen (If Applicable):  No results found for: LABABO, [address]    Anesthesia Evaluation  Patient summary reviewed and Nursing notes reviewed  Airway: Mallampati: III  TM distance: >3 FB   Neck ROM: full  Mouth opening: > = 3 FB

## 2018-09-20 NOTE — BH NOTE
`[facility]  Admission Note     Admission Type:   Admission Type: Involuntary    Reason for admission:  Reason for Admission: Pt was admitted involuntarily due to exhibiting paranoid thoughts, appears to be a harm to himself. Patient's paranoia causes him to refuse all medical work up. Care provider believed that pt would benefit from in patient psych treatment    PATIENT STRENGTHS:  Strengths: Communication, Connection to output provider    Patient Strengths and Limitations:  Limitations: General negative or hopeless attitude about future/recovery, Difficulty problem solving/relies on others to help solve problems    Addictive Behavior:   Addictive Behavior  In the past 3 months, have you felt or has someone told you that you have a problem with:  : Other(Comments) (RANDAL. Patient refused to answer questions. States \" they already asked me over there\".)  Do you have a history of Chemical Use?: Comment (RANDAL. Patient refused to answer questions. States \" they already asked me over there\".)  Do you have a history of Alcohol Use?: Comment (RANDAL. Patient refused to answer questions. States \" they already asked me over there\".)  Do you have a history of Street Drug Abuse?: Comment (RANDAL. Patient refused to answer questions. States \" they already asked me over there\". )  Histroy of Prescripton Drug Abuse?: Comment (RANDAL. Patient refused to answer questions. States \" they already asked me over there\". )    Medical Problems:   Past Medical History:   Diagnosis Date    Anxiety     Arthritis     Asthma     Bipolar I disorder, most recent episode (or current) depressed, unspecified 9/12/2014    Clostridium difficile infection     COPD (chronic obstructive pulmonary disease) (HCC)     Depression     Disease of blood and blood forming organ     Eczema     Fracture, metacarpal     R 4th and 5th    Gastric ulcer     Gastritis 06/13/2018    GERD (gastroesophageal reflux disease)     GI bleed  H. 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[facility]     Substance abuse     Suicidal ideation     Su[facility] by hanging ([facility]     Tobacco dependence     Ulcerative colitis ([facility]     UTI (urinary tract infection)        Status EXAM:  Status and Exam  Normal: No  Facial Expression: Flat, Sad, Worried  Affect: Blunt  Level of Consciousness: Alert  Mood:Normal: No  Mood: Depressed, Anxious, Suspicious, Irritable  Motor Activity:Normal: No  Motor Activity: Decreased  Interview Behavior: Irritable, Uncooperative/Withdrawn  Preception: [place] to Person, [place] to Time, [place] to Place, [place] to Situation  Attention:Normal: No  Attention: Distractible, Unable to Concentrate  Thought Processes: Blocking  Thought Content:Normal: No  Thought Content: Preoccupations  Hallucinations:  (RANDAL, pt refused to answer)  Delusions: No  Memory:Normal:  (RANDAL)  Insight and Judgment: No  Insight and Judgment: Poor Judgment, Poor Insight, Unmotivated, Unrealistic  Present Suicidal Ideation: Other(See comment) (RANDAL, pt refused to answer)  Present Homicidal Ideation: Other(See comment) (RANDAL, pt refused to answer)    Tobacco Screening:  Practical Counseling, on admission, reuben X, if applicable and completed (first 3 are required if patient doesn't refuse):             (x )  Recognizing danger situations (included triggers and roadblocks)                    (x )  Coping skills (new ways to manage stress, exercise, relaxation techniques, changing routine, distraction)                                                           (x )  Basic information about quitting (benefits of quitting, techniques in how to quit, available resources  ( ) Referral for counseling Akanksha RN

## 2018-09-20 NOTE — BH NOTE
[address] [facility]                   [address] [facility], [address]                              PSYCHIATRIC EVALUATION    PATIENT NAME: Ciara Odell                    :        1990  MED REC NO:   [id number]                              ROOM:       [id number]  ACCOUNT NO:   [de-identified]                           ADMIT DATE: 2018  PROVIDER:     Cass Pereira    COMPREHENSIVE PSYCHIATRIC EVALUATION    HISTORY OF PRESENTING ILLNESS AND REASON FOR CURRENT ADMISSION:  The  patient is a 27-year-old male feeling depressed and sad, having suicidal  thoughts. He was transferred and admitted from Internal Medicine. The  patient was admitted to Internal Medicine because of the abdominal pain and  dehydration. PAST PSYCHIATRIC HISTORY:  History of bipolar disorder, opiate dependence,  currently on methadone. He denies any other drug use. MEDICAL AND SURGICAL HISTORY:  History of GI bleed and cholecystectomy. ALLERGIES:  He is allergic to GEODON, HALOPERIDOL, IV DYE, BENTYL,  FAMOTIDINE, and FLAGYL. PERSONAL, FAMILY, AND SOCIAL HISTORY:  He lives by himself. He has a  long-term history of opiate dependence. He denies any current legal  problems. He denies any physical, sexual or emotional abuse in him. MENTAL STATUS EXAMINATION:  The patient is cooperative. He has adequate  eye contact. He has adequate rapport. His speech is within normal limits. His mood is subjectively sad, objectively appears to be depressed. He has  appropriate affect. He has decreased psychomotor activity. He has  adequate rapport. Thought process within normal limits. Thought contents  predominantly of depression and suicidal thoughts and plans to overdose and  die. He denies any homicidal thoughts or plans. He is experiencing  auditory hallucinations telling him different things. He is of average  intelligence. He is oriented to time, place and person.   His memory

## 2018-09-20 NOTE — PLAN OF CARE
Problem: Altered Mood, Psychotic Behavior:  Intervention: Assess behavior for possible injury to self  Pt declined to attend psychotherapy at 1000 am despite encouragement. Pt offered 1:1 and refused.

## 2018-09-20 NOTE — H&P
Code     Chief Complaint:          Chief Complaint   Patient presents with   [address] Problem    Abdominal Pain    Chest Pain            History Obtained From:      patient     History of Present Illness:      The patient is a 32 y.o. Non-/non  male who presents       Yariel Del Rosario a 32 y. o. male with  has a past medical history of Anxiety; Arthritis; Asthma; Bipolar I disorder, most recent episode (or current) depressed, unspecified; Clostridium difficile infection; COPD (chronic obstructive pulmonary disease) ([address] Depression; Disease of blood and blood forming organ; Eczema; Fracture, metacarpal; Gastric ulcer; Gastritis; GERD (gastroesophageal reflux disease); GI bleed; H. pylori infection; H/O blood clots; Head injury; Headache; Insomnia; Juvenile rheumatoid arthritis ([facility] [address] Neuromuscular disorder ([address] PFAPA syndrome ([address] PUD (peptic ulcer disease); Rheumatoid arthritis ([address] Rheumatoid arthritis(714.0); Sleep apnea; 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[address] Substance abuse; Suicidal ideation; Suicide attempt by hanging ([address] Tobacco dependence; Ulcerative colitis ([address] and UTI (urinary tract infection).     HPI   Patient presents for ER follow up. He has had several ED visits for nausea, vomiting, abdominal pain and blood in stool. There is concern for pain medication seeking behavior. Patient is listing multiple symptoms and asking for specific pain medications. Vitals and physical exam benign.  Patient states he did have appointment with Dr. Degroot Going for EGD/colonoscopy but did not have a ride to the procedure.              Past Medical History:      Past Medical History        Past Medical History:   Diagnosis Date    Anxiety      Arthritis      Asthma      Bipolar I disorder, most recent episode (or current) depressed, unspecified 9/12/2014    Clostridium difficile infection      COPD (chronic obstructive pulmonary disease) ([facility] [address]      Depression      Disease of blood and blood Protectants, Misc. (EUCERIN) cream Apply topically as needed. 8/22/18     Mitzi Pruitt PA-C   albuterol sulfate HFA (VENTOLIN HFA) 108 (90 Base) MCG/ACT inhaler Inhale 2 puffs into the lungs every 6 hours as needed for Wheezing 8/13/18     Russ Clifford MD            Allergies:      Geodon [ziprasidone hcl]; Haldol [haloperidol]; Iv dye [iodides]; Bentyl [dicyclomine hcl]; Dicyclomine; Famotidine; and Flagyl [metronidazole]     Social History:      Tobacco:    reports that he has been smoking E-Cigarettes and Cigarettes. He has a 2.50 pack-year smoking history. He has never used smokeless tobacco.  Alcohol:      reports that he does not drink alcohol. Drug Use:  reports that he does not use drugs.     Family History:      Family History         Family History   Problem Relation Age of Onset    Diabetes Father      Alcohol Abuse Father      Depression Father      Arthritis Father      High Blood Pressure Father      Other Father           aneurysm & epilepsy    Migraines Father      Arthritis Mother      Other Mother           aneurysm & epilepsy    Migraines Mother      Diabetes Brother           Aunt and uncles    Depression Brother      Mental Illness Brother      Other Brother           epilepsy    Migraines Brother      Stroke Other           Uncle    Other Brother           murdered Oct 6th, 2014    Colon Cancer Paternal Cousin 43    Other Sister           epilepsy    Migraines Sister              Review of Systems:      Positive and Negative as described in HPI.     CONSTITUTIONAL:  negative for fevers, chills, sweats, fatigue, weight loss  HEENT:  negative for vision, hearing changes, runny nose, throat pain  RESPIRATORY:  negative for shortness of breath, cough, congestion, wheezing. CARDIOVASCULAR:  negative for chest pain, palpitations.   GASTROINTESTINAL:  negative for nausea, vomiting, diarrhea, constipation, change in bowel habits, abdominal pain   GENITOURINARY:posfor Ventricular Rate 86 BPM     Atrial Rate 86 BPM     P-R Interval 136 ms     QRS Duration 72 ms     Q-T Interval 374 ms     QTc Calculation (Bazett) 447 ms     P Axis 42 degrees     R Axis 46 degrees     T Axis 27 degrees                Recent Labs      09/14/18   0137   HGB  13.4   HCT  42.2   WBC  6.7   MCV  91.9   NA  130*   K  3.5*   CL  94*   CO2  27   BUN  8   CREATININE  0.84   GLUCOSE  106*   AST  25   ALT  21   LABALBU  4.1         Hematology:No results for input(s): WBC, RBC, HGB, HCT, MCV, MCH, MCHC, RDW, PLT, MPV, SEDRATE, CRP, INR, DDIMER, [id number], LABABSO in the last 72 hours.     Invalid input(s): PT  Chemistry:No results for input(s): NA, K, CL, CO2, GLUCOSE, BUN, CREATININE, MG, ANIONGAP, LABGLOM, GFRAA, CALCIUM, CAION, PHOS, PSA, PROBNP, TROPONINI, TROPONINT, CKTOTAL, CKMB, CKMBINDEX, MYOGLOBIN, DIGOXIN, LACTACIDWB in the last 72 hours. No results for input(s): PROT, LABALBU, LABA1C, E5HUECK, I7MHYAA, FT4, TSH, AST, ALT, LDH, GGT, ALKPHOS, LABGGT, BILITOT, BILIDIR, AMMONIA, AMYLASE, LIPASE, LACTATE, CHOL, HDL, LDLCHOLESTEROL, CHOLHDLRATIO, TRIG, VLDL, KAH61GF, PHENYTOIN, PHENYF, URICACID, POCGLU in the last 72 hours.     Imaging/Diagnostics:         Ct Abdomen Pelvis Wo Contrast     Result Date: 8/22/2018  EXAMINATION: CT OF THE ABDOMEN AND PELVIS WITHOUT CONTRAST 8/22/2018 3:50 pm TECHNIQUE: CT of the abdomen and pelvis was performed without the administration of intravenous contrast. Multiplanar reformatted images are provided for review. Dose modulation, iterative reconstruction, and/or weight based adjustment of the mA/kV was utilized to reduce the radiation dose to as low as reasonably achievable. COMPARISON: CT abdomen and pelvis June 28, 2018. HISTORY: ORDERING SYSTEM PROVIDED HISTORY: diffuse abdominal pain  contrast allergy Pt is well known to hospital for abd pain/N/V. Pt sts he was \"just here for this except now my junk burns. \" pt sts \"I was suppose to see a GI doctor for a scope but I 9/14/2018 1:49 am COMPARISON: 08/19/2018 HISTORY: ORDERING SYSTEM PROVIDED HISTORY: abd pain TECHNOLOGIST PROVIDED HISTORY: abd pain FINDINGS: Lungs are clear without acute process. No pleural effusion or pneumothorax. No focal consolidation or edema. Cardiomediastinal silhouette and bony thorax are without acute abnormality. No evidence of intraperitoneal free air. Nonspecific bowel gas pattern without evidence of obstruction. No abnormal calcifications. Osseous structures are intact.      No acute process in the chest. No bowel obstruction or free air.                Impressions :       1. Active Problems:    Abdominal pain  Resolved Problems:    * No resolved hospital problems. *      all w/u neg poor compliance      psych issues        2. Past Medical History in prose (no negatives)    has a past medical history of Anxiety; Arthritis; Asthma; Bipolar I disorder, most recent episode (or current) depressed, unspecified (9/12/2014); Clostridium difficile infection; COPD (chronic obstructive pulmonary disease) ([address] Depression; Disease of blood and blood forming organ; Eczema; Fracture, metacarpal; Gastric ulcer; Gastritis (06/13/2018); GERD (gastroesophageal reflux disease); GI bleed; H. pylori infection; H/O blood clots; Head injury; Headache; Insomnia; Juvenile rheumatoid arthritis ([address] Neuromuscular disorder ([address] PFAPA syndrome ([address] PUD (peptic ulcer disease); Rheumatoid arthritis ([address] Rheumatoid arthritis(714.0); Sleep apnea; 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[address] Substance abuse; Suicidal ideation; Suicide attempt by hanging ([address]  Tobacco dependence; Ulcerative colitis ([address] and UTI (urinary tract infection).         Plans:      1.  D/c to psych       Medically stable    f/u with gi      Current Facility-Administered Medications             Current Facility-Administered Medications   Medication Dose Route Frequency Provider Last Rate Last Dose    sodium chloride flush 0.9 % injection 10 mL  10 mL Intravenous 2 times per day Сергей Combs MD        sodium chloride flush 0.9 % injection 10 mL  10 mL Intravenous PRN Сергей Combs MD        acetaminophen (TYLENOL) tablet 650 mg  650 mg Oral Q4H PRN Сергей Combs MD        meloxicam (MOBIC) tablet 15 mg  15 mg Oral Daily Akila Mantilla MD        albuterol sulfate  (90 Base) MCG/ACT inhaler 2 puff  2 puff Inhalation Q6H PRN Herbert Perry MD        Fluticasone Furoate-Vilanterol 200-25 MCG/INH AEPB 1 puff  1 puff Inhalation Daily Akila Mantilla MD        gabapentin (NEURONTIN) capsule 600 mg  600 mg Oral TID Akila Mantilla MD        DULoxetine (CYMBALTA) extended release capsule 90 mg  90 mg Oral Daily Akila Mantilla MD        QUEtiapine (SEROQUEL) tablet 100 mg  100 mg Oral Nightly Akila Mantilla MD        doxycycline monohydrate (MONODOX) capsule 100 mg  100 mg Oral Daily Akila Mantilla MD        vitamin D (ERGOCALCIFEROL) capsule 50,000 Units  50,000 Units Oral Weekly Akila Mantilla MD        tiZANidine (ZANAFLEX) tablet 2 mg  2 mg Oral Q8H PRN Akila Mantilla MD        predniSONE (DELTASONE) tablet 5 mg  5 mg Oral Daily Akila Mantilla MD        cyclobenzaprine (FLEXERIL) tablet 10 mg  10 mg Oral TID PRN Akila Mantilla MD        pantoprazole (PROTONIX) tablet 40 mg  40 mg Oral QAM AC Akila Mantilla MD        nicotine (NICODERM CQ) 21 MG/24HR 1 patch  1 patch Transdermal Daily Akila Mantilla MD   1 patch at 09/18/18 1201                Kathleen oGdinez MD  9/18/2018  12:07 PM

## 2018-09-21 LAB — SURGICAL PATHOLOGY REPORT: NORMAL

## 2018-09-21 PROCEDURE — 6370000000 HC RX 637 (ALT 250 FOR IP): Performed by: INTERNAL MEDICINE

## 2018-09-21 PROCEDURE — 6370000000 HC RX 637 (ALT 250 FOR IP): Performed by: PSYCHIATRY & NEUROLOGY

## 2018-09-21 PROCEDURE — 1240000000 HC EMOTIONAL WELLNESS R&B

## 2018-09-21 RX ORDER — METHADONE HYDROCHLORIDE 10 MG/5ML
94 SOLUTION ORAL DAILY
Status: DISCONTINUED | OUTPATIENT
Start: 2018-09-21 | End: 2018-10-01 | Stop reason: HOSPADM

## 2018-09-21 RX ADMIN — GABAPENTIN 300 MG: 300 CAPSULE ORAL at 14:32

## 2018-09-21 RX ADMIN — DOXYCYCLINE 100 MG: 100 CAPSULE ORAL at 10:50

## 2018-09-21 RX ADMIN — KETOROLAC TROMETHAMINE 10 MG: 10 TABLET, FILM COATED ORAL at 10:50

## 2018-09-21 RX ADMIN — CYCLOBENZAPRINE HYDROCHLORIDE 10 MG: 10 TABLET, FILM COATED ORAL at 10:51

## 2018-09-21 RX ADMIN — KETOROLAC TROMETHAMINE 10 MG: 10 TABLET, FILM COATED ORAL at 22:55

## 2018-09-21 RX ADMIN — QUETIAPINE FUMARATE 100 MG: 100 TABLET ORAL at 21:23

## 2018-09-21 RX ADMIN — GABAPENTIN 300 MG: 300 CAPSULE ORAL at 21:23

## 2018-09-21 RX ADMIN — GABAPENTIN 300 MG: 300 CAPSULE ORAL at 10:51

## 2018-09-21 RX ADMIN — CYCLOBENZAPRINE HYDROCHLORIDE 10 MG: 10 TABLET, FILM COATED ORAL at 21:23

## 2018-09-21 RX ADMIN — METHADONE HYDROCHLORIDE 94 MG: 10 SOLUTION ORAL at 12:06

## 2018-09-21 ASSESSMENT — PAIN SCALES - GENERAL
PAINLEVEL_OUTOF10: 0
PAINLEVEL_OUTOF10: 8
PAINLEVEL_OUTOF10: 7
PAINLEVEL_OUTOF10: 7
PAINLEVEL_OUTOF10: 5

## 2018-09-21 NOTE — BH NOTE
Patient refused morning medications due to being upset that methadone was not ordered and patient was being transferred.

## 2018-09-21 NOTE — PROGRESS NOTES
Psychoeducation Group Note    Date: 9/21/18  Start Time: 1330  End Time: 1415    Number Participants in Group:  6/20    Goal:  Patient will demonstrate increased interpersonal interaction   Topic: benefits of keeping a journal, creative expression    Discipline Responsible:   OT  AT  [facility]. X RT  Other       Participation Level:     None  Minimal   X Active Listener X Interactive    Monopolizing         Participation Quality:  X Appropriate  Inappropriate   X       Attentive        Intrusive          Sharing        Resistant          Supportive        Lethargic       Affective:   X Congruent  Incongruent  Blunted  Flat    Constricted  Anxious  Elated  Angry    Labile  Depressed  Other         Cognitive:  X Alert X Oriented PPTP     Concentration X G  F  P   Attention Span X G  F  P   Short-Term Memory  G  F  P   Long-Term Memory  G  F  P   ProblemSolving/  Decision Making X G  F  P   Ability to Process  Information X G  F  P      Contributing Factors             Delusional             Hallucinating             Flight of Ideas             Other:       Modes of Intervention:  X Education X Support X Exploration    Clarifying X Problem Solving X Confrontation   X Socialization  Limit Setting  Reality Testing   X Activity  Movement  Media    Other:            Response to Learning:  X Able to verbalize current knowledge/experience    Able to verbalize/acknowledge new learning   X Able to retain information    Capable of insight   X Able to change behavior    Progressing to goal    Other:        Comments:

## 2018-09-21 NOTE — PLAN OF CARE
Problem: Pain:  Goal: Pain level will decrease  Pain level will decrease   Outcome: Ongoing  Patient denies any pain at this time. Goal: Control of acute pain  Control of acute pain   Outcome: Ongoing  Patient denies any pain at this time. Goal: Control of chronic pain  Control of chronic pain   Outcome: Ongoing  Patient denies any pain at this time.

## 2018-09-21 NOTE — PLAN OF CARE
Problem: Altered Mood, Depressive Behavior:  Intervention: Assess psychological signs and symptoms of depression  Patient is isolative and withdrawn. Patient is evasive during 1:1 interview. Patient has flat affect and avoidant gaze. Goal: Able to verbalize and/or display a decrease in depressive symptoms  Able to verbalize and/or display a decrease in depressive symptoms   Outcome: Ongoing  Patient reports \"a little\" depression. Patient is isolative and withdrawn. Patient is evasive during 1:1 interview. Patient has flat affect and avoidant gaze. Goal: Ability to disclose and discuss suicidal ideas will improve  Ability to disclose and discuss suicidal ideas will improve   Outcome: Ongoing  Patient denies suicidal ideations and thoughts to harm self. Problem: Altered Mood, Psychotic Behavior:  Goal: Able to verbalize reality based thinking  Able to verbalize reality based thinking   Outcome: Ongoing  Patient is fully oriented. Patient denies any hallucinations at this time and does exhibit any signs of responding to internal stimuli. Goal: Absence of self-harm  Absence of self-harm   Outcome: Ongoing  Patient denies any thoughts to harm self.

## 2018-09-21 NOTE — BH NOTE
Patient is pacing up and down. He has restricted mood and affect. He complains of racing thoughts and agitation. He has lose associations and thought blocking. He feels that people are watching him. He believes that people are trying to harm him and kill him by shooting at him. He complains of mood swings. He has dysphoric mood and affect. He has been hearing voices telling him to kill himself and die and beat up other people. He feels that he should overdose on his medication an die. He is unable to focus and concentrate. He is oriented to time place and person. His memory to immediate, recent and remote events are within noramal limits. He has no insight and his judgement is impaired. Plan: to continue current management.   Sindy Pel 32 y.o. male    Vitals:    09/21/18 0814   BP: 122/85   Pulse: 74   Resp: 20   Temp: 98.6 °F (37 °C)   SpO2:        Review of systems  Constitutional:  negative for chills, fevers, sweats  Respiratory:  negative for cough, dyspnea on exertion, hemoptysis, shortness of breath, wheezing  Cardiovascular:  negative for chest pain, chest pressure/discomfort, lower extremity edema, palpitations  Gastrointestinal:  negative for abdominal pain, constipation, diarrhea, nausea, vomiting  Neurological:  negative for dizziness, headache

## 2018-09-22 PROCEDURE — 1240000000 HC EMOTIONAL WELLNESS R&B

## 2018-09-22 PROCEDURE — 6370000000 HC RX 637 (ALT 250 FOR IP): Performed by: INTERNAL MEDICINE

## 2018-09-22 PROCEDURE — 6370000000 HC RX 637 (ALT 250 FOR IP): Performed by: PSYCHIATRY & NEUROLOGY

## 2018-09-22 PROCEDURE — 6360000002 HC RX W HCPCS: Performed by: PSYCHIATRY & NEUROLOGY

## 2018-09-22 RX ORDER — PROMETHAZINE HYDROCHLORIDE 12.5 MG/1
12.5 TABLET ORAL 3 TIMES DAILY PRN
Status: DISCONTINUED | OUTPATIENT
Start: 2018-09-22 | End: 2018-10-01 | Stop reason: HOSPADM

## 2018-09-22 RX ORDER — DIPHENHYDRAMINE HCL 25 MG
25 TABLET ORAL NIGHTLY PRN
Status: DISCONTINUED | OUTPATIENT
Start: 2018-09-22 | End: 2018-09-28

## 2018-09-22 RX ORDER — ERGOCALCIFEROL 1.25 MG/1
50000 CAPSULE ORAL WEEKLY
Status: DISCONTINUED | OUTPATIENT
Start: 2018-09-22 | End: 2018-10-01 | Stop reason: HOSPADM

## 2018-09-22 RX ORDER — DULOXETIN HYDROCHLORIDE 30 MG/1
30 CAPSULE, DELAYED RELEASE ORAL DAILY
Status: DISCONTINUED | OUTPATIENT
Start: 2018-09-23 | End: 2018-10-01 | Stop reason: HOSPADM

## 2018-09-22 RX ORDER — BUPROPION HYDROCHLORIDE 150 MG/1
150 TABLET ORAL DAILY
Status: DISCONTINUED | OUTPATIENT
Start: 2018-09-22 | End: 2018-10-01 | Stop reason: HOSPADM

## 2018-09-22 RX ADMIN — CYCLOBENZAPRINE HYDROCHLORIDE 10 MG: 10 TABLET, FILM COATED ORAL at 14:58

## 2018-09-22 RX ADMIN — DIPHENHYDRAMINE HCL 25 MG: 25 TABLET ORAL at 20:53

## 2018-09-22 RX ADMIN — PROMETHAZINE HYDROCHLORIDE 12.5 MG: 12.5 TABLET ORAL at 20:53

## 2018-09-22 RX ADMIN — QUETIAPINE FUMARATE 100 MG: 100 TABLET ORAL at 20:53

## 2018-09-22 RX ADMIN — DOXYCYCLINE 100 MG: 100 CAPSULE ORAL at 09:06

## 2018-09-22 RX ADMIN — PROMETHAZINE HYDROCHLORIDE 12.5 MG: 12.5 TABLET ORAL at 12:51

## 2018-09-22 RX ADMIN — CYCLOBENZAPRINE HYDROCHLORIDE 10 MG: 10 TABLET, FILM COATED ORAL at 09:07

## 2018-09-22 RX ADMIN — KETOROLAC TROMETHAMINE 10 MG: 10 TABLET, FILM COATED ORAL at 12:41

## 2018-09-22 RX ADMIN — GABAPENTIN 300 MG: 300 CAPSULE ORAL at 14:58

## 2018-09-22 RX ADMIN — METHADONE HYDROCHLORIDE 94 MG: 10 SOLUTION ORAL at 09:06

## 2018-09-22 RX ADMIN — CYCLOBENZAPRINE HYDROCHLORIDE 10 MG: 10 TABLET, FILM COATED ORAL at 20:53

## 2018-09-22 RX ADMIN — ERGOCALCIFEROL 50000 UNITS: 1.25 CAPSULE ORAL at 12:41

## 2018-09-22 RX ADMIN — BUPROPION HYDROCHLORIDE 150 MG: 150 TABLET, FILM COATED, EXTENDED RELEASE ORAL at 12:41

## 2018-09-22 RX ADMIN — GABAPENTIN 300 MG: 300 CAPSULE ORAL at 09:06

## 2018-09-22 RX ADMIN — GABAPENTIN 300 MG: 300 CAPSULE ORAL at 20:53

## 2018-09-22 ASSESSMENT — PAIN SCALES - GENERAL
PAINLEVEL_OUTOF10: 8
PAINLEVEL_OUTOF10: 0
PAINLEVEL_OUTOF10: 0
PAINLEVEL_OUTOF10: 6

## 2018-09-22 NOTE — PLAN OF CARE
Problem: Altered Mood, Depressive Behavior:  Goal: Able to verbalize and/or display a decrease in depressive symptoms  Able to verbalize and/or display a decrease in depressive symptoms   Outcome: Ongoing  PT denies being depressed. PT isolative to room. PT refused pm medication. PT irritable. Goal: Ability to disclose and discuss suicidal ideas will improve  Ability to disclose and discuss suicidal ideas will improve   Outcome: Ongoing   Pt denies suicidal ideations at this time. Pt agreed to seek staff at anytime he/she felt like any urges to harm self would arise. Safety checks maintained [id number].

## 2018-09-23 PROCEDURE — 1240000000 HC EMOTIONAL WELLNESS R&B

## 2018-09-23 PROCEDURE — 6370000000 HC RX 637 (ALT 250 FOR IP): Performed by: INTERNAL MEDICINE

## 2018-09-23 PROCEDURE — 6370000000 HC RX 637 (ALT 250 FOR IP): Performed by: PSYCHIATRY & NEUROLOGY

## 2018-09-23 PROCEDURE — 6360000002 HC RX W HCPCS: Performed by: PSYCHIATRY & NEUROLOGY

## 2018-09-23 RX ORDER — OLANZAPINE 10 MG/1
10 INJECTION, POWDER, LYOPHILIZED, FOR SOLUTION INTRAMUSCULAR 2 TIMES DAILY PRN
Status: DISCONTINUED | OUTPATIENT
Start: 2018-09-23 | End: 2018-10-01 | Stop reason: HOSPADM

## 2018-09-23 RX ADMIN — QUETIAPINE FUMARATE 100 MG: 100 TABLET ORAL at 22:06

## 2018-09-23 RX ADMIN — METHADONE HYDROCHLORIDE 94 MG: 10 SOLUTION ORAL at 10:48

## 2018-09-23 RX ADMIN — GABAPENTIN 300 MG: 300 CAPSULE ORAL at 15:45

## 2018-09-23 RX ADMIN — KETOROLAC TROMETHAMINE 10 MG: 10 TABLET, FILM COATED ORAL at 00:48

## 2018-09-23 RX ADMIN — DIPHENHYDRAMINE HCL 25 MG: 25 TABLET ORAL at 20:36

## 2018-09-23 RX ADMIN — KETOROLAC TROMETHAMINE 10 MG: 10 TABLET, FILM COATED ORAL at 10:56

## 2018-09-23 RX ADMIN — DOXYCYCLINE 100 MG: 100 CAPSULE ORAL at 10:48

## 2018-09-23 RX ADMIN — PROMETHAZINE HYDROCHLORIDE 12.5 MG: 12.5 TABLET ORAL at 18:10

## 2018-09-23 RX ADMIN — ACETAMINOPHEN 650 MG: 325 TABLET, FILM COATED ORAL at 20:44

## 2018-09-23 RX ADMIN — CYCLOBENZAPRINE HYDROCHLORIDE 10 MG: 10 TABLET, FILM COATED ORAL at 18:10

## 2018-09-23 RX ADMIN — GABAPENTIN 300 MG: 300 CAPSULE ORAL at 20:36

## 2018-09-23 RX ADMIN — PROMETHAZINE HYDROCHLORIDE 12.5 MG: 12.5 TABLET ORAL at 10:56

## 2018-09-23 RX ADMIN — CYCLOBENZAPRINE HYDROCHLORIDE 10 MG: 10 TABLET, FILM COATED ORAL at 10:56

## 2018-09-23 RX ADMIN — GABAPENTIN 300 MG: 300 CAPSULE ORAL at 10:48

## 2018-09-23 ASSESSMENT — PAIN SCALES - GENERAL
PAINLEVEL_OUTOF10: 5
PAINLEVEL_OUTOF10: 8
PAINLEVEL_OUTOF10: 8
PAINLEVEL_OUTOF10: 5
PAINLEVEL_OUTOF10: 9
PAINLEVEL_OUTOF10: 0

## 2018-09-23 ASSESSMENT — PAIN DESCRIPTION - PAIN TYPE
TYPE: ACUTE PAIN
TYPE: CHRONIC PAIN

## 2018-09-23 ASSESSMENT — PAIN DESCRIPTION - LOCATION
LOCATION: KNEE
LOCATION: ABDOMEN

## 2018-09-23 NOTE — PLAN OF CARE
Problem: Altered Mood, Depressive Behavior:  Goal: Able to verbalize and/or display a decrease in depressive symptoms  Able to verbalize and/or display a decrease in depressive symptoms   Outcome: Ongoing  Patient admits to depression at this time. Patient was out in dayroom most of the evening but aloof of peers. Goal: Ability to disclose and discuss suicidal ideas will improve  Ability to disclose and discuss suicidal ideas will improve   Outcome: Ongoing  Patient denies any suicidal ideation at this time. Problem: Altered Mood, Psychotic Behavior:  Goal: Able to verbalize reality based thinking  Able to verbalize reality based thinking   Outcome: Ongoing  Patient could verbalizes reality based thinking. Goal: Absence of self-harm  Absence of self-harm   Outcome: Ongoing  Patient is free of self harm at this time. Patient agrees to seek out staff if thoughts to harm self arise. Staff will provide support and reassurance as needed. Safety checks maintained every 15 minutes.

## 2018-09-24 PROCEDURE — 6370000000 HC RX 637 (ALT 250 FOR IP): Performed by: PSYCHIATRY & NEUROLOGY

## 2018-09-24 PROCEDURE — 6370000000 HC RX 637 (ALT 250 FOR IP): Performed by: INTERNAL MEDICINE

## 2018-09-24 PROCEDURE — 6360000002 HC RX W HCPCS: Performed by: PSYCHIATRY & NEUROLOGY

## 2018-09-24 PROCEDURE — 1240000000 HC EMOTIONAL WELLNESS R&B

## 2018-09-24 RX ORDER — ARIPIPRAZOLE 15 MG/1
15 TABLET ORAL DAILY
Status: DISCONTINUED | OUTPATIENT
Start: 2018-09-24 | End: 2018-09-26

## 2018-09-24 RX ADMIN — DULOXETINE HYDROCHLORIDE 30 MG: 30 CAPSULE, DELAYED RELEASE ORAL at 09:32

## 2018-09-24 RX ADMIN — GABAPENTIN 300 MG: 300 CAPSULE ORAL at 14:07

## 2018-09-24 RX ADMIN — CYCLOBENZAPRINE HYDROCHLORIDE 10 MG: 10 TABLET, FILM COATED ORAL at 09:37

## 2018-09-24 RX ADMIN — DIPHENHYDRAMINE HCL 25 MG: 25 TABLET ORAL at 20:31

## 2018-09-24 RX ADMIN — PROMETHAZINE HYDROCHLORIDE 12.5 MG: 12.5 TABLET ORAL at 23:30

## 2018-09-24 RX ADMIN — CYCLOBENZAPRINE HYDROCHLORIDE 10 MG: 10 TABLET, FILM COATED ORAL at 16:36

## 2018-09-24 RX ADMIN — METHADONE HYDROCHLORIDE 94 MG: 10 SOLUTION ORAL at 09:31

## 2018-09-24 RX ADMIN — QUETIAPINE FUMARATE 100 MG: 100 TABLET ORAL at 21:33

## 2018-09-24 RX ADMIN — CYCLOBENZAPRINE HYDROCHLORIDE 10 MG: 10 TABLET, FILM COATED ORAL at 23:30

## 2018-09-24 RX ADMIN — GABAPENTIN 300 MG: 300 CAPSULE ORAL at 20:31

## 2018-09-24 RX ADMIN — BUPROPION HYDROCHLORIDE 150 MG: 150 TABLET, FILM COATED, EXTENDED RELEASE ORAL at 09:32

## 2018-09-24 RX ADMIN — ARIPIPRAZOLE 15 MG: 15 TABLET ORAL at 09:37

## 2018-09-24 RX ADMIN — GABAPENTIN 300 MG: 300 CAPSULE ORAL at 09:32

## 2018-09-24 RX ADMIN — ACETAMINOPHEN 650 MG: 325 TABLET, FILM COATED ORAL at 16:36

## 2018-09-24 RX ADMIN — KETOROLAC TROMETHAMINE 10 MG: 10 TABLET, FILM COATED ORAL at 09:37

## 2018-09-24 RX ADMIN — MAGNESIUM HYDROXIDE 30 ML: 400 SUSPENSION ORAL at 17:49

## 2018-09-24 RX ADMIN — DOXYCYCLINE 100 MG: 100 CAPSULE ORAL at 09:32

## 2018-09-24 RX ADMIN — PROMETHAZINE HYDROCHLORIDE 12.5 MG: 12.5 TABLET ORAL at 16:36

## 2018-09-24 RX ADMIN — PROMETHAZINE HYDROCHLORIDE 12.5 MG: 12.5 TABLET ORAL at 09:37

## 2018-09-24 ASSESSMENT — PAIN SCALES - GENERAL
PAINLEVEL_OUTOF10: 8
PAINLEVEL_OUTOF10: 7
PAINLEVEL_OUTOF10: 3
PAINLEVEL_OUTOF10: 3
PAINLEVEL_OUTOF10: 5

## 2018-09-24 ASSESSMENT — PAIN DESCRIPTION - ORIENTATION: ORIENTATION: RIGHT;LEFT

## 2018-09-24 ASSESSMENT — PAIN DESCRIPTION - LOCATION: LOCATION: ABDOMEN;LEG

## 2018-09-24 NOTE — FLOWSHEET NOTE
*Patient participated in Spirituality Group       09/24/18 [id number]   Encounter Summary   Services provided to: Patient   Referral/Consult From: Rounding   Continue Visiting (9/24/18)   Complexity of Encounter Low   Length of Encounter 30 minutes   Spiritual Assessment Completed Yes   Spiritual/Worship   Type Spiritual support   Assessment Calm   Intervention Active listening   Outcome Receptive

## 2018-09-24 NOTE — PLAN OF CARE
Problem: Altered Mood, Depressive Behavior:  Goal: Able to verbalize and/or display a decrease in depressive symptoms  Able to verbalize and/or display a decrease in depressive symptoms   Patient did not attend communication skills from [id number] despite staff encouragement and prompts.

## 2018-09-24 NOTE — PLAN OF CARE
Problem: Altered Mood, Depressive Behavior:  Goal: Able to verbalize and/or display a decrease in depressive symptoms  Able to verbalize and/or display a decrease in depressive symptoms   Outcome: Ongoing  Patient in room resting. Patient has flat affect and exhibits paranoid thinking. Goal: Ability to disclose and discuss suicidal ideas will improve  Ability to disclose and discuss suicidal ideas will improve   Outcome: Ongoing  Patient currently denies any suicidal thoughts. Will continue to monitor. Problem: Altered Mood, Psychotic Behavior:  Goal: Able to verbalize reality based thinking  Able to verbalize reality based thinking   Outcome: Ongoing  Patient verbalizes paranoid thoughts throughout talk time with writer. Patient focused on having door locked and being able to place objects on door handle in order to hear if anyone enters his room. Patient discusses no plans for discharge at this time. Patient questions what medications will be due and believes the doses need to be adjusted. Goal: Absence of self-harm  Absence of self-harm   Outcome: Ongoing  Patient remains free from self-harm. 15 minute checks maintained for patient safety. Will continue to monitor and provide support and reassurance as needed.

## 2018-09-24 NOTE — PLAN OF CARE
Problem: Altered Mood, Psychotic Behavior:  Goal: Able to verbalize reality based thinking  Able to verbalize reality based thinking   Patient attend skills group although slept throughout group, patient did not engage in group conversation with other peers from [phone].

## 2018-09-25 PROCEDURE — 6370000000 HC RX 637 (ALT 250 FOR IP): Performed by: PSYCHIATRY & NEUROLOGY

## 2018-09-25 PROCEDURE — 6360000002 HC RX W HCPCS: Performed by: PSYCHIATRY & NEUROLOGY

## 2018-09-25 PROCEDURE — 1240000000 HC EMOTIONAL WELLNESS R&B

## 2018-09-25 PROCEDURE — 6370000000 HC RX 637 (ALT 250 FOR IP): Performed by: INTERNAL MEDICINE

## 2018-09-25 RX ADMIN — CYCLOBENZAPRINE HYDROCHLORIDE 10 MG: 10 TABLET, FILM COATED ORAL at 14:49

## 2018-09-25 RX ADMIN — ACETAMINOPHEN 650 MG: 325 TABLET, FILM COATED ORAL at 13:57

## 2018-09-25 RX ADMIN — GABAPENTIN 300 MG: 300 CAPSULE ORAL at 20:55

## 2018-09-25 RX ADMIN — DOXYCYCLINE 100 MG: 100 CAPSULE ORAL at 09:22

## 2018-09-25 RX ADMIN — GABAPENTIN 300 MG: 300 CAPSULE ORAL at 13:15

## 2018-09-25 RX ADMIN — ACETAMINOPHEN 650 MG: 325 TABLET, FILM COATED ORAL at 01:28

## 2018-09-25 RX ADMIN — ARIPIPRAZOLE 15 MG: 15 TABLET ORAL at 09:22

## 2018-09-25 RX ADMIN — CYCLOBENZAPRINE HYDROCHLORIDE 10 MG: 10 TABLET, FILM COATED ORAL at 20:55

## 2018-09-25 RX ADMIN — DIPHENHYDRAMINE HCL 25 MG: 25 TABLET ORAL at 20:55

## 2018-09-25 RX ADMIN — GABAPENTIN 300 MG: 300 CAPSULE ORAL at 09:22

## 2018-09-25 RX ADMIN — BUPROPION HYDROCHLORIDE 150 MG: 150 TABLET, FILM COATED, EXTENDED RELEASE ORAL at 09:22

## 2018-09-25 RX ADMIN — QUETIAPINE FUMARATE 100 MG: 100 TABLET ORAL at 22:30

## 2018-09-25 RX ADMIN — CYCLOBENZAPRINE HYDROCHLORIDE 10 MG: 10 TABLET, FILM COATED ORAL at 09:35

## 2018-09-25 RX ADMIN — METHADONE HYDROCHLORIDE 94 MG: 10 SOLUTION ORAL at 09:23

## 2018-09-25 RX ADMIN — PROMETHAZINE HYDROCHLORIDE 12.5 MG: 12.5 TABLET ORAL at 09:35

## 2018-09-25 RX ADMIN — PROMETHAZINE HYDROCHLORIDE 12.5 MG: 12.5 TABLET ORAL at 20:55

## 2018-09-25 RX ADMIN — DULOXETINE HYDROCHLORIDE 30 MG: 30 CAPSULE, DELAYED RELEASE ORAL at 09:22

## 2018-09-25 RX ADMIN — MAGNESIUM HYDROXIDE 30 ML: 400 SUSPENSION ORAL at 09:35

## 2018-09-25 ASSESSMENT — PAIN SCALES - GENERAL
PAINLEVEL_OUTOF10: 10
PAINLEVEL_OUTOF10: 6
PAINLEVEL_OUTOF10: 8
PAINLEVEL_OUTOF10: 0
PAINLEVEL_OUTOF10: 3
PAINLEVEL_OUTOF10: 3

## 2018-09-25 ASSESSMENT — PAIN DESCRIPTION - LOCATION
LOCATION: HEAD
LOCATION: HEAD

## 2018-09-25 NOTE — BH NOTE
Patient has poor rapport. He has poor eye contact. He has poor speech output. His psychomotor activity is decreased. He has lose associations. He has delusions of persecution. He has delusions of reference. He is experiencing auditory hallucinations and he is actively responding to them. He said that he is hearing a lot of voices and he is unable to make out the conversations. He is not taking care of his personal hygiene. He needs constant redirection to care for himself. He gets agitated and irritable for no reason. He has trouble with attention and concentration. He has no insight. His judgement bis impaired. His memory and orientation are intact  Plan: continue current management.

## 2018-09-25 NOTE — PLAN OF CARE
Problem: Altered Mood, Depressive Behavior:  Goal: Able to verbalize and/or display a decrease in depressive symptoms  Able to verbalize and/or display a decrease in depressive symptoms   Outcome: Ongoing  Pt did not attend decision making skills group at [phone] despite staff invitation to attend.

## 2018-09-26 PROCEDURE — 6370000000 HC RX 637 (ALT 250 FOR IP): Performed by: INTERNAL MEDICINE

## 2018-09-26 PROCEDURE — 6370000000 HC RX 637 (ALT 250 FOR IP): Performed by: PSYCHIATRY & NEUROLOGY

## 2018-09-26 PROCEDURE — 1240000000 HC EMOTIONAL WELLNESS R&B

## 2018-09-26 PROCEDURE — 6360000002 HC RX W HCPCS: Performed by: PSYCHIATRY & NEUROLOGY

## 2018-09-26 RX ORDER — ARIPIPRAZOLE 20 MG/1
20 TABLET ORAL DAILY
Status: DISCONTINUED | OUTPATIENT
Start: 2018-09-27 | End: 2018-10-01 | Stop reason: HOSPADM

## 2018-09-26 RX ADMIN — DOXYCYCLINE 100 MG: 100 CAPSULE ORAL at 08:21

## 2018-09-26 RX ADMIN — ACETAMINOPHEN 650 MG: 325 TABLET, FILM COATED ORAL at 20:53

## 2018-09-26 RX ADMIN — DIPHENHYDRAMINE HCL 25 MG: 25 TABLET ORAL at 20:53

## 2018-09-26 RX ADMIN — METHADONE HYDROCHLORIDE 94 MG: 10 SOLUTION ORAL at 08:21

## 2018-09-26 RX ADMIN — DULOXETINE HYDROCHLORIDE 30 MG: 30 CAPSULE, DELAYED RELEASE ORAL at 08:21

## 2018-09-26 RX ADMIN — GABAPENTIN 300 MG: 300 CAPSULE ORAL at 20:53

## 2018-09-26 RX ADMIN — ACETAMINOPHEN 650 MG: 325 TABLET, FILM COATED ORAL at 08:20

## 2018-09-26 RX ADMIN — CYCLOBENZAPRINE HYDROCHLORIDE 10 MG: 10 TABLET, FILM COATED ORAL at 20:53

## 2018-09-26 RX ADMIN — GABAPENTIN 300 MG: 300 CAPSULE ORAL at 08:21

## 2018-09-26 RX ADMIN — CYCLOBENZAPRINE HYDROCHLORIDE 10 MG: 10 TABLET, FILM COATED ORAL at 08:21

## 2018-09-26 RX ADMIN — GABAPENTIN 300 MG: 300 CAPSULE ORAL at 13:35

## 2018-09-26 RX ADMIN — QUETIAPINE FUMARATE 100 MG: 100 TABLET ORAL at 22:34

## 2018-09-26 RX ADMIN — PROMETHAZINE HYDROCHLORIDE 12.5 MG: 12.5 TABLET ORAL at 08:20

## 2018-09-26 RX ADMIN — CYCLOBENZAPRINE HYDROCHLORIDE 10 MG: 10 TABLET, FILM COATED ORAL at 17:46

## 2018-09-26 RX ADMIN — ACETAMINOPHEN 650 MG: 325 TABLET, FILM COATED ORAL at 17:46

## 2018-09-26 RX ADMIN — PROMETHAZINE HYDROCHLORIDE 12.5 MG: 12.5 TABLET ORAL at 19:33

## 2018-09-26 RX ADMIN — ARIPIPRAZOLE 15 MG: 15 TABLET ORAL at 08:20

## 2018-09-26 ASSESSMENT — PAIN SCALES - GENERAL
PAINLEVEL_OUTOF10: 3
PAINLEVEL_OUTOF10: 8
PAINLEVEL_OUTOF10: 0
PAINLEVEL_OUTOF10: 3
PAINLEVEL_OUTOF10: 9

## 2018-09-26 ASSESSMENT — PAIN DESCRIPTION - LOCATION
LOCATION: CHEST
LOCATION: GENERALIZED

## 2018-09-26 ASSESSMENT — PAIN DESCRIPTION - FREQUENCY: FREQUENCY: CONTINUOUS

## 2018-09-26 ASSESSMENT — PAIN DESCRIPTION - PAIN TYPE
TYPE: ACUTE PAIN
TYPE: CHRONIC PAIN

## 2018-09-26 ASSESSMENT — PAIN DESCRIPTION - DESCRIPTORS: DESCRIPTORS: TIGHTNESS

## 2018-09-26 NOTE — PLAN OF CARE
Problem: Altered Mood, Psychotic Behavior:  Goal: Able to verbalize reality based thinking  Able to verbalize reality based thinking   Patient did not attend skills group which focused on self assessment and areas of improvement from [id number] despite staff encouragement and prompts.

## 2018-09-26 NOTE — PLAN OF CARE
Problem: Altered Mood, Depressive Behavior:  Goal: Able to verbalize and/or display a decrease in depressive symptoms  Able to verbalize and/or display a decrease in depressive symptoms   Outcome: Ongoing  Pt. Alert and oriented, able to make needs known, Pt. Denies all, Pt. Continues to appear paranoid placing stuff on door buck to known when we open door, Pt. Refusing medications stating they dont help or they effect is ATB, Pt. Denies thoughts of self harm, Pt. Complains of pain not relieved by methadone, Pt.  Isolates to room, out for needs only   Goal: Ability to disclose and discuss suicidal ideas will improve  Ability to disclose and discuss suicidal ideas will improve   Outcome: Ongoing      Problem: Altered Mood, Psychotic Behavior:  Goal: Able to verbalize reality based thinking  Able to verbalize reality based thinking   Outcome: Ongoing    Goal: Absence of self-harm  Absence of self-harm   Outcome: Ongoing      Problem: Pain:  Goal: Pain level will decrease  Pain level will decrease   Outcome: Ongoing    Goal: Control of acute pain  Control of acute pain   Outcome: Ongoing    Goal: Control of chronic pain  Control of chronic pain   Outcome: Ongoing

## 2018-09-26 NOTE — BH NOTE
Patient has delusions of persecution and delusions of reference. He is experiencing auditory hallucinations. He is actively responding to internal stimuli. He feels that he has nothing in his life. He feels that there is no one in his life. He is unhappy that his life has no purpose. He has no insight and his judgement is impaired. He is anxious and agitated for no reason. He is refusing to take his medication. Plan: to continue current management.   Michelle Laboy 32 y.o. male    Vitals:    09/26/18 0830   BP: 136/85   Pulse: 68   Resp: 14   Temp: 97.4 °F (36.3 °C)   SpO2:        Review of systems  Constitutional:  negative for chills, fevers, sweats  Respiratory:  negative for cough, dyspnea on exertion, hemoptysis, shortness of breath, wheezing  Cardiovascular:  negative for chest pain, chest pressure/discomfort, lower extremity edema, palpitations  Gastrointestinal:  negative for abdominal pain, constipation, diarrhea, nausea, vomiting  Neurological:  negative for dizziness, headache

## 2018-09-26 NOTE — CARE COORDINATION
Patient continues to be isolative to room and not participating in groups and will come out to eat, watch tv and is selective with medications he is taking. Patient has been encouraged by staff to come out and participate in groups.

## 2018-09-27 PROCEDURE — 6370000000 HC RX 637 (ALT 250 FOR IP): Performed by: PSYCHIATRY & NEUROLOGY

## 2018-09-27 PROCEDURE — 6370000000 HC RX 637 (ALT 250 FOR IP): Performed by: INTERNAL MEDICINE

## 2018-09-27 PROCEDURE — 6360000002 HC RX W HCPCS: Performed by: PSYCHIATRY & NEUROLOGY

## 2018-09-27 PROCEDURE — 1240000000 HC EMOTIONAL WELLNESS R&B

## 2018-09-27 RX ADMIN — BUPROPION HYDROCHLORIDE 150 MG: 150 TABLET, FILM COATED, EXTENDED RELEASE ORAL at 09:01

## 2018-09-27 RX ADMIN — CYCLOBENZAPRINE HYDROCHLORIDE 10 MG: 10 TABLET, FILM COATED ORAL at 19:34

## 2018-09-27 RX ADMIN — ARIPIPRAZOLE 20 MG: 20 TABLET ORAL at 09:01

## 2018-09-27 RX ADMIN — METHADONE HYDROCHLORIDE 94 MG: 10 SOLUTION ORAL at 08:45

## 2018-09-27 RX ADMIN — DOXYCYCLINE 100 MG: 100 CAPSULE ORAL at 09:00

## 2018-09-27 RX ADMIN — CYCLOBENZAPRINE HYDROCHLORIDE 10 MG: 10 TABLET, FILM COATED ORAL at 09:00

## 2018-09-27 RX ADMIN — GABAPENTIN 300 MG: 300 CAPSULE ORAL at 09:00

## 2018-09-27 RX ADMIN — DULOXETINE HYDROCHLORIDE 30 MG: 30 CAPSULE, DELAYED RELEASE ORAL at 09:01

## 2018-09-27 RX ADMIN — MOMETASONE FUROATE AND FORMOTEROL FUMARATE DIHYDRATE 2 PUFF: 200; 5 AEROSOL RESPIRATORY (INHALATION) at 22:40

## 2018-09-27 RX ADMIN — GABAPENTIN 300 MG: 300 CAPSULE ORAL at 22:41

## 2018-09-27 RX ADMIN — QUETIAPINE FUMARATE 100 MG: 100 TABLET ORAL at 22:41

## 2018-09-27 RX ADMIN — DIPHENHYDRAMINE HCL 25 MG: 25 TABLET ORAL at 22:41

## 2018-09-27 RX ADMIN — PROMETHAZINE HYDROCHLORIDE 12.5 MG: 12.5 TABLET ORAL at 19:34

## 2018-09-27 RX ADMIN — GABAPENTIN 300 MG: 300 CAPSULE ORAL at 15:17

## 2018-09-27 ASSESSMENT — PAIN SCALES - GENERAL
PAINLEVEL_OUTOF10: 6
PAINLEVEL_OUTOF10: 4

## 2018-09-27 NOTE — PLAN OF CARE
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 Changes to plan of Care:  No    Status EXAM:   Status and Exam  Normal: No  Facial Expression: Avoids Gaze, Flat  Affect: Appropriate  Level of Consciousness: Alert  Mood:Normal: No  Mood: Depressed, Anxious, Suspicious, Irritable  Motor Activity:Normal: No  Motor Activity: Decreased  Interview Behavior: Cooperative, Impulsive  Preception: Talmage to Person, Tamera April to Time, Talmage to Place, Talmage to Situation  Attention:Normal: No  Attention: Distractible  Thought Processes: Circumstantial  Thought Content:Normal: No  Thought Content: Poverty of Content, Paranoia  Hallucinations: None  Delusions: Yes  Delusions: Persecution  Memory:Normal: No  Memory: Poor Recent, Poor Remote  Insight and Judgment: No  Insight and Judgment: Poor Judgment, Poor Insight, Unmotivated  Present Suicidal Ideation: No  Present Homicidal Ideation: No    Daily Assessment Last Entry:   Daily Sleep (WDL): Within Defined Limits         Patient Currently in Pain: Yes  Daily Nutrition (WDL): Within Defined Limits    Patient Monitoring:  Frequency of Checks: 4 times per hour, close    Psychiatric Symptoms:   Depression Symptoms  Depression Symptoms: Loss of interest, Impaired concentration  Anxiety Symptoms  Anxiety Symptoms: Generalized  Elizabeth Symptoms  Elizabeth Symptoms: Poor judgment     Psychosis Symptoms  Delusion Type: No problems reported or observed. Suicide Risk CSSR-S:  1) Within the past month, have you wished you were dead or wished you could go to sleep and not wake up? :  (RANDAL.   Patient refused to answer questions. States \" they already asked me over there\".)  2) Within the past month, have you actually had any thoughts of killing yourself? :  (RANDAL. Patient refused to answer questions. States \" they already asked me over there\". )  3) Within the past month, have you been thinking about how you might kill yourself? :  (RANDAL. Patient refused to answer questions. States \" they already asked me over there\".)  5) Within the past month, have you started to work out or worked out the details of how to kill yourself? Do you intend to carry out this plan? :  (RANDAL. Patient refused to answer questions. States \" they already asked me over there\". )  6)  Have you ever done anything, started to do anything, or prepared to do anything to end your life?: YES  Change in result:   Change in plan of care:     EDUCATION:   Learner Progress Toward Treatment Goals:  Reviewed results and recommendations of this team, reviewed group plan and strategies, reviewed signs, symptoms and risk of self harm and violent behavior, reviewed goals and plan of care. Method:  individual education, small group, verbal education. Outcome:   Pt verbalized understanding, but needs reinforcement to obtain goals. PATIENT GOALS:  Short term:  Pt declined to participate  Long term:  Pt declined to participate    PLAN/TREATMENT RECOMMENDATIONS UPDATE:   Continue with group therapies, education of coping skills   Continue to monitor patient on unit. Medications provided/medication compliance by patient. Continue for plans to obtain long term goals after discharge.     SHORT-TERM GOALS UPDATE:  Time frame for Short-Term Goals:  8-14days     LONG-TERM GOALS UPDATE:  Time frame for Long-Term Goals:  6 months    Members Present in Team Meeting:   See signature sheet  DIANE Morris    Goal: Ability to disclose and discuss suicidal ideas will improve  Ability to disclose and discuss suicidal ideas will improve   Outcome: Ongoing      Problem:

## 2018-09-27 NOTE — BH NOTE
Progress note  Mikaela Khoury 32 y.o. male    Vitals:    09/27/18 0800   BP: (!) 144/78   Pulse: 69   Resp: 14   Temp: 97.5 °F (36.4 °C)   SpO2:        Review of systems  Constitutional:  negative for chills, fevers, sweats  Respiratory:  negative for cough, dyspnea on exertion, hemoptysis, shortness of breath, wheezing  Cardiovascular:  negative for chest pain, chest pressure/discomfort, lower extremity edema, palpitations  Gastrointestinal:  negative for abdominal pain, constipation, diarrhea, nausea, vomiting  Neurological:  negative for dizziness, headache  Patient is feeling overwhelmed and sad. He said that a lot of things in his life bother him. He complains of agitation and anxiety. He feels depressed and sad. He complains of lack of energy and motivation. He said that it is difficult for him to focus and concentrate. He feels restless. He feels useless, worthless and hopeless. He is oriented to time p[lace and person. His memory to recent remote and immediate events are within normal limits. He has suicidal thoughts and plans to overdose and die. Plan: to continue current management.     Electronically signed by Taz Sultana MD on 9/27/2018 at 8:59 AM

## 2018-09-27 NOTE — PLAN OF CARE
Problem: Altered Mood, Depressive Behavior:  Intervention: Group therapy to identify positive coping skills  Pt declined to attend psychotherapy at 1000 am despite encouragement. Pt offered 1:1 and refused.

## 2018-09-27 NOTE — PLAN OF CARE
Problem: Altered Mood, Psychotic Behavior:  Goal: Able to verbalize reality based thinking  Able to verbalize reality based thinking   Outcome: Ongoing  Pt appears paranoid at times,  And continues to have claims of chest pains, nausea, and blood tinged sputum. Pt shows no s/s of distress when not standing at desk in front of staff. No vomiting or sputum was showed to nurse at anytime during shift. When patient is in room he has continuously placing hygiene products, chairs and other assortments in front of the door, not allowing proper passage to his room. Pt educated on the danger for himself and staff or not being able to exit/ enter the room. aloof of his peers. Patient will continue  to be monitor for safety. Safety check maintained [id number]  Goal: Absence of self-harm  Absence of self-harm   Outcome: Ongoing  Pt remains free from self harm this shift. Pt denies wanting to cause harm to self or others at this time. Pt encouraged to seek nursing staff at anytime if he felt at danger to themselves or others. Pt states understanding.  Safety checks maintained [id number]

## 2018-09-27 NOTE — PLAN OF CARE
Problem: Altered Mood, Depressive Behavior:  Goal: Able to verbalize and/or display a decrease in depressive symptoms  Able to verbalize and/or display a decrease in depressive symptoms   Outcome: Ongoing  Patient verbalizes no change in depressive symptoms over past 24 hours. Goal: Ability to disclose and discuss suicidal ideas will improve  Ability to disclose and discuss suicidal ideas will improve   Outcome: Ongoing  Patient denies suicidal ideation at this time.

## 2018-09-28 PROCEDURE — 6370000000 HC RX 637 (ALT 250 FOR IP): Performed by: INTERNAL MEDICINE

## 2018-09-28 PROCEDURE — 6370000000 HC RX 637 (ALT 250 FOR IP): Performed by: PSYCHIATRY & NEUROLOGY

## 2018-09-28 PROCEDURE — 1240000000 HC EMOTIONAL WELLNESS R&B

## 2018-09-28 PROCEDURE — 94664 DEMO&/EVAL PT USE INHALER: CPT

## 2018-09-28 PROCEDURE — 6360000002 HC RX W HCPCS: Performed by: PSYCHIATRY & NEUROLOGY

## 2018-09-28 RX ORDER — DIPHENHYDRAMINE HCL 25 MG
25 TABLET ORAL EVERY 8 HOURS PRN
Status: DISCONTINUED | OUTPATIENT
Start: 2018-09-28 | End: 2018-10-01 | Stop reason: HOSPADM

## 2018-09-28 RX ADMIN — GABAPENTIN 300 MG: 300 CAPSULE ORAL at 09:03

## 2018-09-28 RX ADMIN — PROMETHAZINE HYDROCHLORIDE 12.5 MG: 12.5 TABLET ORAL at 12:49

## 2018-09-28 RX ADMIN — PROMETHAZINE HYDROCHLORIDE 12.5 MG: 12.5 TABLET ORAL at 20:31

## 2018-09-28 RX ADMIN — DOXYCYCLINE 100 MG: 100 CAPSULE ORAL at 09:03

## 2018-09-28 RX ADMIN — ACETAMINOPHEN 650 MG: 325 TABLET, FILM COATED ORAL at 12:48

## 2018-09-28 RX ADMIN — DIPHENHYDRAMINE HCL 25 MG: 25 TABLET ORAL at 20:31

## 2018-09-28 RX ADMIN — BUPROPION HYDROCHLORIDE 150 MG: 150 TABLET, FILM COATED, EXTENDED RELEASE ORAL at 09:02

## 2018-09-28 RX ADMIN — CYCLOBENZAPRINE HYDROCHLORIDE 10 MG: 10 TABLET, FILM COATED ORAL at 12:49

## 2018-09-28 RX ADMIN — QUETIAPINE FUMARATE 100 MG: 100 TABLET ORAL at 21:02

## 2018-09-28 RX ADMIN — MOMETASONE FUROATE AND FORMOTEROL FUMARATE DIHYDRATE 2 PUFF: 200; 5 AEROSOL RESPIRATORY (INHALATION) at 20:31

## 2018-09-28 RX ADMIN — ACETAMINOPHEN 650 MG: 325 TABLET, FILM COATED ORAL at 01:25

## 2018-09-28 RX ADMIN — CYCLOBENZAPRINE HYDROCHLORIDE 10 MG: 10 TABLET, FILM COATED ORAL at 19:08

## 2018-09-28 RX ADMIN — MAGNESIUM HYDROXIDE 30 ML: 400 SUSPENSION ORAL at 01:25

## 2018-09-28 RX ADMIN — MAGNESIUM HYDROXIDE 30 ML: 400 SUSPENSION ORAL at 15:07

## 2018-09-28 RX ADMIN — ARIPIPRAZOLE 20 MG: 20 TABLET ORAL at 09:02

## 2018-09-28 RX ADMIN — GABAPENTIN 300 MG: 300 CAPSULE ORAL at 15:07

## 2018-09-28 RX ADMIN — DULOXETINE HYDROCHLORIDE 30 MG: 30 CAPSULE, DELAYED RELEASE ORAL at 09:03

## 2018-09-28 RX ADMIN — METHADONE HYDROCHLORIDE 94 MG: 10 SOLUTION ORAL at 09:03

## 2018-09-28 RX ADMIN — GABAPENTIN 300 MG: 300 CAPSULE ORAL at 20:31

## 2018-09-28 ASSESSMENT — PAIN DESCRIPTION - ORIENTATION: ORIENTATION: LEFT;RIGHT

## 2018-09-28 ASSESSMENT — PAIN SCALES - GENERAL
PAINLEVEL_OUTOF10: 0
PAINLEVEL_OUTOF10: 10
PAINLEVEL_OUTOF10: 10
PAINLEVEL_OUTOF10: 5
PAINLEVEL_OUTOF10: 9
PAINLEVEL_OUTOF10: 4
PAINLEVEL_OUTOF10: 0
PAINLEVEL_OUTOF10: 0

## 2018-09-28 ASSESSMENT — PAIN DESCRIPTION - PAIN TYPE
TYPE: ACUTE PAIN
TYPE: CHRONIC PAIN

## 2018-09-28 ASSESSMENT — PAIN DESCRIPTION - LOCATION
LOCATION: FOOT
LOCATION: HEAD
LOCATION: GENERALIZED
LOCATION: LEG

## 2018-09-28 ASSESSMENT — PAIN DESCRIPTION - DESCRIPTORS: DESCRIPTORS: ACHING

## 2018-09-28 NOTE — PLAN OF CARE
Problem: Altered Mood, Psychotic Behavior:  Goal: Absence of self-harm  Absence of self-harm   Patient did not attend skills group which focused on how to communicate effectively from 2022-2768 despite staff encouragement and prompts.

## 2018-09-28 NOTE — PLAN OF CARE
Problem: Altered Mood, Depressive Behavior:  Goal: Able to verbalize and/or display a decrease in depressive symptoms  Able to verbalize and/or display a decrease in depressive symptoms   Outcome: Ongoing  Patient has been out in the milieu watching television. Patient stated that he has been vomiting all day and was requesting writer to call doctor for increase in phenergan. Writer informed the patient that he has not taken the phenergan all day and the doctor will not be called at this time. Writer administered phenergan dose that the patient already had ordered. The patient continued to be out in the milieu watching television. No vomiting was noted. Patient denied voices but continues to appear paranoid at times. Patient was compliant with scheduled evening medications. Goal: Ability to disclose and discuss suicidal ideas will improve  Ability to disclose and discuss suicidal ideas will improve   Outcome: Ongoing  Patient denies any suicidal thoughts at this time. Patient agrees to come talk with staff if having any thoughts to harm himself this shift. 15 min rounds continued for patient safety.

## 2018-09-28 NOTE — PLAN OF CARE
Problem: Altered Mood, Depressive Behavior:  Goal: Ability to disclose and discuss suicidal ideas will improve  Ability to disclose and discuss suicidal ideas will improve   Outcome: Ongoing  Pt did not attend Community Meeting at 0900 d/t resting in room despite staff invitation to attend.

## 2018-09-28 NOTE — PLAN OF CARE
Problem: Altered Mood, Depressive Behavior:  Goal: Able to verbalize and/or display a decrease in depressive symptoms  Able to verbalize and/or display a decrease in depressive symptoms   Outcome: Ongoing    Goal: Ability to disclose and discuss suicidal ideas will improve  Ability to disclose and discuss suicidal ideas will improve   Outcome: Ongoing  Pt denies all admits to elevated anxiety and depression, calm controlled cooperative yet irritable, paranoid suspicious, Reports normal sleep and appetite. out in the milieu isolative. Spontaneous safety checks to be maintained by staff.

## 2018-09-29 PROCEDURE — 6360000002 HC RX W HCPCS: Performed by: PSYCHIATRY & NEUROLOGY

## 2018-09-29 PROCEDURE — 6370000000 HC RX 637 (ALT 250 FOR IP): Performed by: INTERNAL MEDICINE

## 2018-09-29 PROCEDURE — 6370000000 HC RX 637 (ALT 250 FOR IP): Performed by: PSYCHIATRY & NEUROLOGY

## 2018-09-29 PROCEDURE — 1240000000 HC EMOTIONAL WELLNESS R&B

## 2018-09-29 RX ADMIN — DULOXETINE HYDROCHLORIDE 30 MG: 30 CAPSULE, DELAYED RELEASE ORAL at 09:32

## 2018-09-29 RX ADMIN — ERGOCALCIFEROL 50000 UNITS: 1.25 CAPSULE ORAL at 09:31

## 2018-09-29 RX ADMIN — ARIPIPRAZOLE 20 MG: 20 TABLET ORAL at 09:31

## 2018-09-29 RX ADMIN — DIPHENHYDRAMINE HCL 25 MG: 25 TABLET ORAL at 19:53

## 2018-09-29 RX ADMIN — MAGNESIUM HYDROXIDE 30 ML: 400 SUSPENSION ORAL at 11:46

## 2018-09-29 RX ADMIN — PROMETHAZINE HYDROCHLORIDE 12.5 MG: 12.5 TABLET ORAL at 19:53

## 2018-09-29 RX ADMIN — PROMETHAZINE HYDROCHLORIDE 12.5 MG: 12.5 TABLET ORAL at 09:55

## 2018-09-29 RX ADMIN — CYCLOBENZAPRINE HYDROCHLORIDE 10 MG: 10 TABLET, FILM COATED ORAL at 11:45

## 2018-09-29 RX ADMIN — DOXYCYCLINE 100 MG: 100 CAPSULE ORAL at 09:33

## 2018-09-29 RX ADMIN — DIPHENHYDRAMINE HCL 25 MG: 25 TABLET ORAL at 14:46

## 2018-09-29 RX ADMIN — GABAPENTIN 300 MG: 300 CAPSULE ORAL at 14:45

## 2018-09-29 RX ADMIN — QUETIAPINE FUMARATE 100 MG: 100 TABLET ORAL at 22:02

## 2018-09-29 RX ADMIN — ACETAMINOPHEN 650 MG: 325 TABLET, FILM COATED ORAL at 01:03

## 2018-09-29 RX ADMIN — METHADONE HYDROCHLORIDE 94 MG: 10 SOLUTION ORAL at 09:31

## 2018-09-29 RX ADMIN — GABAPENTIN 300 MG: 300 CAPSULE ORAL at 19:53

## 2018-09-29 RX ADMIN — CYCLOBENZAPRINE HYDROCHLORIDE 10 MG: 10 TABLET, FILM COATED ORAL at 19:53

## 2018-09-29 RX ADMIN — GABAPENTIN 300 MG: 300 CAPSULE ORAL at 09:31

## 2018-09-29 RX ADMIN — CYCLOBENZAPRINE HYDROCHLORIDE 10 MG: 10 TABLET, FILM COATED ORAL at 01:03

## 2018-09-29 ASSESSMENT — PAIN SCALES - GENERAL
PAINLEVEL_OUTOF10: 4
PAINLEVEL_OUTOF10: 9
PAINLEVEL_OUTOF10: 8
PAINLEVEL_OUTOF10: 5
PAINLEVEL_OUTOF10: 0

## 2018-09-29 NOTE — PLAN OF CARE
Problem: Altered Mood, Depressive Behavior:  Goal: Able to verbalize and/or display a decrease in depressive symptoms  Able to verbalize and/or display a decrease in depressive symptoms   Outcome: Ongoing  PSYCHOEDUCATION GROUP NOTE    Date: 9/29/18  Start Time: 0915  End Time: 0930    Number Participants in Group:  4    Goal:  Patient will demonstrate increased interpersonal interaction   Topic: Community meeting and goal setting    Discipline Responsible:   OT  AT  [facility]. x RT MHP Other       Participation Level:     None  Minimal    Active Listener x Interactive    Monopolizing         Participation Quality:  x Appropriate  Inappropriate   x       Attentive        Intrusive          Sharing        Resistant          Supportive        Lethargic       Affective:    Congruent  Incongruent  Blunted  Flat   x Constricted  Anxious  Elated  Angry    Labile  Depressed  Other         Cognitive:  x Alert x Oriented PPTP     Concentration  G x F  P   Attention Span  G x F  P   Short-Term Memory  G x F  P   Long-Term Memory  G x F  P   ProblemSolving/  Decision Making  G x F  P   Ability to Process  Information  G x F  P      Contributing Factors             Delusional             Hallucinating             Flight of Ideas             Other:       Modes of Intervention:  x Education x Support x Exploration    Clarifying  Problem Solving  Confrontation   x Socialization x Limit Setting  Reality Testing   x Activity  Movement  Media    Other:            Response to Learning:  x Able to verbalize current knowledge/experience   x Able to verbalize/acknowledge new learning   x Able to retain information    Capable of insight   x Able to change behavior   x Progressing to goal    Other:        Comments: Pt attended group and participated.

## 2018-09-29 NOTE — PLAN OF CARE
Problem: Altered Mood, Depressive Behavior:  Goal: Able to verbalize and/or display a decrease in depressive symptoms  Able to verbalize and/or display a decrease in depressive symptoms   Outcome: Ongoing  Pt states that he is feeling better. He was out in the milieu most of the day. He attended some groups and was medication compliant. Goal: Ability to disclose and discuss suicidal ideas will improve  Ability to disclose and discuss suicidal ideas will improve   Outcome: Ongoing  Pt denied current suicidal ideations. Problem: Altered Mood, Psychotic Behavior:  Goal: Able to verbalize reality based thinking  Able to verbalize reality based thinking   Outcome: Ongoing  Pt continues to make some paranoid statements. He denies auditory/ visual hallucinations. He ate meals out in the dayroom and was medication compliant. Goal: Absence of self-harm  Absence of self-harm   Outcome: Ongoing  Pt did not have any episodes of self harm. Problem: Pain:  Goal: Pain level will decrease  Pain level will decrease   Outcome: Ongoing  Pt continues to have pain.

## 2018-09-30 PROCEDURE — 6370000000 HC RX 637 (ALT 250 FOR IP): Performed by: INTERNAL MEDICINE

## 2018-09-30 PROCEDURE — 6370000000 HC RX 637 (ALT 250 FOR IP): Performed by: PSYCHIATRY & NEUROLOGY

## 2018-09-30 PROCEDURE — 6360000002 HC RX W HCPCS: Performed by: PSYCHIATRY & NEUROLOGY

## 2018-09-30 PROCEDURE — 1240000000 HC EMOTIONAL WELLNESS R&B

## 2018-09-30 RX ADMIN — DULOXETINE HYDROCHLORIDE 30 MG: 30 CAPSULE, DELAYED RELEASE ORAL at 08:36

## 2018-09-30 RX ADMIN — GABAPENTIN 300 MG: 300 CAPSULE ORAL at 20:51

## 2018-09-30 RX ADMIN — ACETAMINOPHEN 650 MG: 325 TABLET, FILM COATED ORAL at 15:52

## 2018-09-30 RX ADMIN — MAGNESIUM HYDROXIDE 30 ML: 400 SUSPENSION ORAL at 15:52

## 2018-09-30 RX ADMIN — PROMETHAZINE HYDROCHLORIDE 12.5 MG: 12.5 TABLET ORAL at 13:18

## 2018-09-30 RX ADMIN — CYCLOBENZAPRINE HYDROCHLORIDE 10 MG: 10 TABLET, FILM COATED ORAL at 13:18

## 2018-09-30 RX ADMIN — ARIPIPRAZOLE 20 MG: 20 TABLET ORAL at 08:35

## 2018-09-30 RX ADMIN — PROMETHAZINE HYDROCHLORIDE 12.5 MG: 12.5 TABLET ORAL at 04:53

## 2018-09-30 RX ADMIN — GABAPENTIN 300 MG: 300 CAPSULE ORAL at 13:18

## 2018-09-30 RX ADMIN — DIPHENHYDRAMINE HCL 25 MG: 25 TABLET ORAL at 04:53

## 2018-09-30 RX ADMIN — PROMETHAZINE HYDROCHLORIDE 12.5 MG: 12.5 TABLET ORAL at 20:51

## 2018-09-30 RX ADMIN — CYCLOBENZAPRINE HYDROCHLORIDE 10 MG: 10 TABLET, FILM COATED ORAL at 20:51

## 2018-09-30 RX ADMIN — METHADONE HYDROCHLORIDE 94 MG: 10 SOLUTION ORAL at 08:30

## 2018-09-30 RX ADMIN — DOXYCYCLINE 100 MG: 100 CAPSULE ORAL at 08:37

## 2018-09-30 RX ADMIN — ACETAMINOPHEN 650 MG: 325 TABLET, FILM COATED ORAL at 04:50

## 2018-09-30 RX ADMIN — CYCLOBENZAPRINE HYDROCHLORIDE 10 MG: 10 TABLET, FILM COATED ORAL at 04:53

## 2018-09-30 RX ADMIN — GABAPENTIN 300 MG: 300 CAPSULE ORAL at 08:37

## 2018-09-30 RX ADMIN — DIPHENHYDRAMINE HCL 25 MG: 25 TABLET ORAL at 20:51

## 2018-09-30 RX ADMIN — QUETIAPINE FUMARATE 100 MG: 100 TABLET ORAL at 20:51

## 2018-09-30 ASSESSMENT — PAIN SCALES - GENERAL
PAINLEVEL_OUTOF10: 3
PAINLEVEL_OUTOF10: 5
PAINLEVEL_OUTOF10: 0
PAINLEVEL_OUTOF10: 7

## 2018-09-30 ASSESSMENT — PAIN DESCRIPTION - LOCATION
LOCATION: HEAD

## 2018-09-30 ASSESSMENT — PAIN DESCRIPTION - PAIN TYPE
TYPE: ACUTE PAIN

## 2018-09-30 NOTE — PLAN OF CARE
Problem: Altered Mood, Depressive Behavior:  Goal: Able to verbalize and/or display a decrease in depressive symptoms  Able to verbalize and/or display a decrease in depressive symptoms   Outcome: Ongoing  Pt did not attend creative expression and motivation skills group at 1330 despite staff invitation to attend.

## 2018-09-30 NOTE — BH NOTE
Date: 9/29/18 Start Time: 830pm End Time: 915pm    Number Participants in Group:  5/13    Goal:  Patient will demonstrate increased interpersonal interaction   Topic: Relaxation and Wrap up    Discipline Responsible:   OT  AT   x Nsg.  RT  Other       Participation Level:     None  Minimal   x Active Listener x Interactive    Monopolizing         Participation Quality:  x Appropriate  Inappropriate   x       Attentive        Intrusive   x       Sharing        Resistant   x       Supportive        Lethargic       Affective:   x Congruent  Incongruent  Blunted  Flat    Constricted  Anxious  Elated  Angry    Labile  Depressed  Other         Cognitive:  x Alert  Oriented PPTP     Concentration x G  F  P   Attention Span x G  F  P   Short-Term Memory x G  F  P   Long-Term Memory x G  F  P   ProblemSolving/  Decision Making x G  F  P   Ability to Process  Information x G  F  P      Contributing Factors             Delusional             Hallucinating             Flight of Ideas             Other:       Modes of Intervention:  x Education  Support  Exploration   x Clarifying  Problem Solving  Confrontation    Socialization  Limit Setting  Reality Testing    Activity  Movement  Media    Other:            Response to Learning:  x Able to verbalize current knowledge/experience   x Able to verbalize/acknowledge new learning    Able to retain information    Capable of insight    Able to change behavior    Progressing to goal    Other:        Comments:

## 2018-10-01 VITALS
HEART RATE: 71 BPM | OXYGEN SATURATION: 95 % | WEIGHT: 240 LBS | RESPIRATION RATE: 14 BRPM | HEIGHT: 70 IN | TEMPERATURE: 98.2 F | SYSTOLIC BLOOD PRESSURE: 118 MMHG | BODY MASS INDEX: 34.36 KG/M2 | DIASTOLIC BLOOD PRESSURE: 66 MMHG

## 2018-10-01 PROBLEM — F31.9 BIPOLAR DISORDER (HCC): Status: RESOLVED | Noted: 2018-09-19 | Resolved: 2018-10-01

## 2018-10-01 PROCEDURE — 6370000000 HC RX 637 (ALT 250 FOR IP): Performed by: PSYCHIATRY & NEUROLOGY

## 2018-10-01 PROCEDURE — 6370000000 HC RX 637 (ALT 250 FOR IP): Performed by: INTERNAL MEDICINE

## 2018-10-01 PROCEDURE — 5130000000 HC BRIDGE APPOINTMENT

## 2018-10-01 RX ORDER — BUPROPION HYDROCHLORIDE 150 MG/1
150 TABLET ORAL DAILY
Qty: 30 TABLET | Refills: 0 | Status: ON HOLD | OUTPATIENT
Start: 2018-10-02 | End: 2019-01-14 | Stop reason: HOSPADM

## 2018-10-01 RX ORDER — PROMETHAZINE HYDROCHLORIDE 12.5 MG/1
12.5 TABLET ORAL 3 TIMES DAILY PRN
Qty: 90 TABLET | Refills: 0 | Status: SHIPPED | OUTPATIENT
Start: 2018-10-01 | End: 2018-10-08

## 2018-10-01 RX ORDER — GABAPENTIN 300 MG/1
300 CAPSULE ORAL 3 TIMES DAILY
Qty: 90 CAPSULE | Refills: 0 | Status: ON HOLD | OUTPATIENT
Start: 2018-10-01 | End: 2019-01-14 | Stop reason: HOSPADM

## 2018-10-01 RX ORDER — PANTOPRAZOLE SODIUM 40 MG/1
40 TABLET, DELAYED RELEASE ORAL
Qty: 30 TABLET | Refills: 0 | Status: ON HOLD | OUTPATIENT
Start: 2018-10-01 | End: 2019-02-18 | Stop reason: HOSPADM

## 2018-10-01 RX ORDER — ACETAMINOPHEN 325 MG/1
650 TABLET ORAL EVERY 6 HOURS PRN
Qty: 30 TABLET | Refills: 0 | Status: SHIPPED | OUTPATIENT
Start: 2018-10-01 | End: 2018-10-24

## 2018-10-01 RX ORDER — FLUTICASONE FUROATE AND VILANTEROL 200; 25 UG/1; UG/1
1 POWDER RESPIRATORY (INHALATION) DAILY
Qty: 1 EACH | Refills: 0 | Status: ON HOLD | OUTPATIENT
Start: 2018-10-01 | End: 2021-06-28

## 2018-10-01 RX ORDER — QUETIAPINE FUMARATE 100 MG/1
100 TABLET, FILM COATED ORAL NIGHTLY
Qty: 30 TABLET | Refills: 0 | Status: ON HOLD | OUTPATIENT
Start: 2018-10-01 | End: 2019-01-14 | Stop reason: HOSPADM

## 2018-10-01 RX ORDER — ALBUTEROL SULFATE 90 UG/1
2 AEROSOL, METERED RESPIRATORY (INHALATION) EVERY 6 HOURS PRN
Qty: 18 G | Refills: 0 | Status: SHIPPED | OUTPATIENT
Start: 2018-10-01 | End: 2019-10-11 | Stop reason: SDUPTHER

## 2018-10-01 RX ORDER — DOXYCYCLINE 100 MG/1
100 CAPSULE ORAL DAILY
Qty: 30 CAPSULE | Refills: 0 | Status: SHIPPED | OUTPATIENT
Start: 2018-10-01 | End: 2018-11-05

## 2018-10-01 RX ORDER — ARIPIPRAZOLE 20 MG/1
20 TABLET ORAL DAILY
Qty: 30 TABLET | Refills: 0 | Status: ON HOLD | OUTPATIENT
Start: 2018-10-02 | End: 2019-01-14 | Stop reason: HOSPADM

## 2018-10-01 RX ORDER — DULOXETIN HYDROCHLORIDE 30 MG/1
30 CAPSULE, DELAYED RELEASE ORAL DAILY
Qty: 30 CAPSULE | Refills: 0 | Status: ON HOLD | OUTPATIENT
Start: 2018-10-02 | End: 2019-01-14 | Stop reason: HOSPADM

## 2018-10-01 RX ORDER — ERGOCALCIFEROL (VITAMIN D2) 1250 MCG
50000 CAPSULE ORAL WEEKLY
Qty: 30 CAPSULE | Refills: 0 | Status: ON HOLD | OUTPATIENT
Start: 2018-10-06 | End: 2021-06-28

## 2018-10-01 RX ORDER — CYCLOBENZAPRINE HCL 10 MG
10 TABLET ORAL 2 TIMES DAILY PRN
Qty: 60 TABLET | Refills: 0 | Status: ON HOLD | OUTPATIENT
Start: 2018-10-01 | End: 2019-01-14 | Stop reason: HOSPADM

## 2018-10-01 RX ORDER — PREDNISONE 1 MG/1
5 TABLET ORAL DAILY
Qty: 30 TABLET | Refills: 0 | Status: ON HOLD | OUTPATIENT
Start: 2018-10-01 | End: 2019-01-14 | Stop reason: HOSPADM

## 2018-10-01 RX ORDER — DIPHENHYDRAMINE HCL 25 MG
25 TABLET ORAL EVERY 8 HOURS PRN
Qty: 90 TABLET | Refills: 0 | Status: SHIPPED | OUTPATIENT
Start: 2018-10-01 | End: 2018-10-31

## 2018-10-01 RX ADMIN — ARIPIPRAZOLE 20 MG: 20 TABLET ORAL at 08:49

## 2018-10-01 RX ADMIN — ACETAMINOPHEN 650 MG: 325 TABLET, FILM COATED ORAL at 01:43

## 2018-10-01 RX ADMIN — GABAPENTIN 300 MG: 300 CAPSULE ORAL at 08:49

## 2018-10-01 RX ADMIN — METHADONE HYDROCHLORIDE 94 MG: 10 SOLUTION ORAL at 08:50

## 2018-10-01 RX ADMIN — DOXYCYCLINE 100 MG: 100 CAPSULE ORAL at 08:49

## 2018-10-01 RX ADMIN — DULOXETINE HYDROCHLORIDE 30 MG: 30 CAPSULE, DELAYED RELEASE ORAL at 08:49

## 2018-10-01 ASSESSMENT — PAIN DESCRIPTION - LOCATION
LOCATION: HEAD
LOCATION: HEAD

## 2018-10-01 ASSESSMENT — PAIN SCALES - GENERAL
PAINLEVEL_OUTOF10: 0
PAINLEVEL_OUTOF10: 4
PAINLEVEL_OUTOF10: 0
PAINLEVEL_OUTOF10: 0

## 2018-10-01 ASSESSMENT — PAIN DESCRIPTION - PAIN TYPE
TYPE: ACUTE PAIN
TYPE: ACUTE PAIN

## 2018-10-01 NOTE — DISCHARGE INSTR - DIET

## 2018-10-01 NOTE — BH NOTE
Pt did not attend creative expression and goal setting skills group at 1100 despite staff invitation to attend.

## 2018-10-01 NOTE — PLAN OF CARE
Problem: Altered Mood, Psychotic Behavior:  Goal: Able to verbalize reality based thinking  Able to verbalize reality based thinking   Outcome: Ongoing  Patient stated to having depression, anxiety rates 5. Social with selective peer.  Behavioral controlled

## 2018-10-01 NOTE — BH NOTE
Patient given tobacco quitline number 94869136920 at this time, refusing to call at this time, states \" I just dont want to quit now\"- patient given information as to the dangers of long term tobacco use. Continue to reinforce the importance of tobacco cessation.

## 2018-10-01 NOTE — DISCHARGE SUMMARY
[address]                   [address] [facility], [address]                                 DISCHARGE SUMMARY    PATIENT NAME: Nilton Krause                    :        1990  MED REC NO:   [id number]                              ROOM:       26  ACCOUNT NO:   [de-identified]                           ADMIT DATE: 2018  PROVIDER:     Katherine Baez [facility] DATE:    HISTORY OF PRESENTING ILLNESS AND REASON FOR CURRENT ADMISSION:  The  patient is a 55-year-old male who had delusions of persecution, delusions  of reference, and auditory hallucinations. He is also feeling depressed  and sad, having suicidal thoughts and plans to walk into traffic and die. With this, he is admitted to [address] [place] [address]:  History of bipolar disorder and opiate  dependence. He is currently on methadone. MEDICAL AND SURGICAL HISTORY:  He has a history of rheumatoid arthritis and  GI bleed. ALLERGIES:  He is allergic to GEODON, HALOPERIDOL, IODINE CONTAINING IV  DYE, BENTYL, FAMOTIDINE, and FLAGYL. COURSE DURING THE HOSPITAL STAY:  After getting admitted to the hospital,  his Seroquel was gradually reduced and he was started on Abilify, which was  increased to 20 mg daily and he refused to take Abilify Maintena injection  at this time. Wellbutrin  mg p.o. daily, doxycycline 100 mg p.o.  daily, Cymbalta 30 mg daily, gabapentin 300 mg three times a day, methadone  94 mg daily, Dulera 2 puffs 2 times a day, Protonix 40 mg daily, prednisone  5 mg daily, Seroquel 100 mg at bedtime, vitamin D 50,000 units once in one  week. With this, he stabilized and he is being discharged home. MENTAL STATUS EXAMINATION:  At the time of discharge, the patient is  cooperative. He has adequate psychomotor activity. He has adequate  rapport. His speech is within normal limits.   His mood subjectively okay,  objectively appears

## 2018-10-09 ENCOUNTER — TELEPHONE (OUTPATIENT)
Dept: GASTROENTEROLOGY | Age: 28
End: 2018-10-09

## 2018-10-24 ENCOUNTER — APPOINTMENT (OUTPATIENT)
Dept: GENERAL RADIOLOGY | Age: 28
End: 2018-10-24
Payer: MEDICARE

## 2018-10-24 ENCOUNTER — HOSPITAL ENCOUNTER (EMERGENCY)
Age: 28
Discharge: HOME | End: 2018-10-24
Attending: EMERGENCY MEDICINE
Payer: MEDICARE

## 2018-10-24 VITALS
WEIGHT: 240 LBS | RESPIRATION RATE: 16 BRPM | SYSTOLIC BLOOD PRESSURE: 148 MMHG | OXYGEN SATURATION: 99 % | BODY MASS INDEX: 35.55 KG/M2 | HEIGHT: 69 IN | TEMPERATURE: 98.1 F | HEART RATE: 86 BPM | DIASTOLIC BLOOD PRESSURE: 124 MMHG

## 2018-10-24 DIAGNOSIS — R11.2 NON-INTRACTABLE VOMITING WITH NAUSEA, UNSPECIFIED VOMITING TYPE: Primary | ICD-10-CM

## 2018-10-24 LAB
ABSOLUTE EOS #: <0.03 K/UL (ref 0–0.44)
ABSOLUTE IMMATURE GRANULOCYTE: 0.03 K/UL (ref 0–0.3)
ABSOLUTE LYMPH #: 1.98 K/UL (ref 1.1–3.7)
ABSOLUTE MONO #: 0.92 K/UL (ref 0.1–1.2)
ALBUMIN SERPL-MCNC: 4.6 G/DL (ref 3.5–5.2)
ALBUMIN/GLOBULIN RATIO: 1.4 (ref 1–2.5)
ALP BLD-CCNC: 64 U/L (ref 40–129)
ALT SERPL-CCNC: 18 U/L (ref 5–41)
ANION GAP SERPL CALCULATED.3IONS-SCNC: 16 MMOL/L (ref 9–17)
AST SERPL-CCNC: 20 U/L
BASOPHILS # BLD: 0 % (ref 0–2)
BASOPHILS ABSOLUTE: <0.03 K/UL (ref 0–0.2)
BILIRUB SERPL-MCNC: 0.16 MG/DL (ref 0.3–1.2)
BILIRUBIN DIRECT: <0.08 MG/DL
BILIRUBIN, INDIRECT: ABNORMAL MG/DL (ref 0–1)
BUN BLDV-MCNC: 10 MG/DL (ref 6–20)
BUN/CREAT BLD: NORMAL (ref 9–20)
CALCIUM SERPL-MCNC: 9.1 MG/DL (ref 8.6–10.4)
CHLORIDE BLD-SCNC: 100 MMOL/L (ref 98–107)
CO2: 25 MMOL/L (ref 20–31)
CREAT SERPL-MCNC: 0.82 MG/DL (ref 0.7–1.2)
D-DIMER QUANTITATIVE: <0.17 MG/L FEU
DIFFERENTIAL TYPE: ABNORMAL
EKG ATRIAL RATE: 106 BPM
EKG P AXIS: 45 DEGREES
EKG P-R INTERVAL: 136 MS
EKG Q-T INTERVAL: 320 MS
EKG QRS DURATION: 64 MS
EKG QTC CALCULATION (BAZETT): 425 MS
EKG R AXIS: 46 DEGREES
EKG T AXIS: 32 DEGREES
EKG VENTRICULAR RATE: 106 BPM
EOSINOPHILS RELATIVE PERCENT: 0 % (ref 1–4)
GFR AFRICAN AMERICAN: >60 ML/MIN
GFR NON-AFRICAN AMERICAN: >60 ML/MIN
GFR SERPL CREATININE-BSD FRML MDRD: NORMAL ML/MIN/{1.73_M2}
GFR SERPL CREATININE-BSD FRML MDRD: NORMAL ML/MIN/{1.73_M2}
GLOBULIN: ABNORMAL G/DL (ref 1.5–3.8)
GLUCOSE BLD-MCNC: 95 MG/DL (ref 70–99)
HCT VFR BLD CALC: 42 % (ref 40.7–50.3)
HEMOGLOBIN: 14.2 G/DL (ref 13–17)
IMMATURE GRANULOCYTES: 0 %
LIPASE: 48 U/L (ref 13–60)
LYMPHOCYTES # BLD: 16 % (ref 24–43)
MCH RBC QN AUTO: 29.5 PG (ref 25.2–33.5)
MCHC RBC AUTO-ENTMCNC: 33.8 G/DL (ref 28.4–34.8)
MCV RBC AUTO: 87.1 FL (ref 82.6–102.9)
MONOCYTES # BLD: 8 % (ref 3–12)
NRBC AUTOMATED: 0 PER 100 WBC
PDW BLD-RTO: 12.9 % (ref 11.8–14.4)
PLATELET # BLD: 264 K/UL (ref 138–453)
PLATELET ESTIMATE: ABNORMAL
PMV BLD AUTO: 9.8 FL (ref 8.1–13.5)
POC TROPONIN I: 0 NG/ML (ref 0–0.1)
POC TROPONIN I: 0 NG/ML (ref 0–0.1)
POC TROPONIN INTERP: NORMAL
POC TROPONIN INTERP: NORMAL
POTASSIUM SERPL-SCNC: 3.9 MMOL/L (ref 3.7–5.3)
RBC # BLD: 4.82 M/UL (ref 4.21–5.77)
RBC # BLD: ABNORMAL 10*6/UL
SEG NEUTROPHILS: 76 % (ref 36–65)
SEGMENTED NEUTROPHILS ABSOLUTE COUNT: 9.27 K/UL (ref 1.5–8.1)
SODIUM BLD-SCNC: 141 MMOL/L (ref 135–144)
TOTAL PROTEIN: 7.8 G/DL (ref 6.4–8.3)
WBC # BLD: 12.2 K/UL (ref 3.5–11.3)
WBC # BLD: ABNORMAL 10*3/UL

## 2018-10-24 PROCEDURE — 85025 COMPLETE CBC W/AUTO DIFF WBC: CPT

## 2018-10-24 PROCEDURE — 83690 ASSAY OF LIPASE: CPT

## 2018-10-24 PROCEDURE — 96375 TX/PRO/DX INJ NEW DRUG ADDON: CPT

## 2018-10-24 PROCEDURE — 93005 ELECTROCARDIOGRAM TRACING: CPT

## 2018-10-24 PROCEDURE — G0384 LEV 5 HOSP TYPE B ED VISIT: HCPCS

## 2018-10-24 PROCEDURE — 6360000002 HC RX W HCPCS: Performed by: STUDENT IN AN ORGANIZED HEALTH CARE EDUCATION/TRAINING PROGRAM

## 2018-10-24 PROCEDURE — 84484 ASSAY OF TROPONIN QUANT: CPT

## 2018-10-24 PROCEDURE — 2580000003 HC RX 258: Performed by: STUDENT IN AN ORGANIZED HEALTH CARE EDUCATION/TRAINING PROGRAM

## 2018-10-24 PROCEDURE — 96374 THER/PROPH/DIAG INJ IV PUSH: CPT

## 2018-10-24 PROCEDURE — 80076 HEPATIC FUNCTION PANEL: CPT

## 2018-10-24 PROCEDURE — 74022 RADEX COMPL AQT ABD SERIES: CPT

## 2018-10-24 PROCEDURE — 6370000000 HC RX 637 (ALT 250 FOR IP): Performed by: STUDENT IN AN ORGANIZED HEALTH CARE EDUCATION/TRAINING PROGRAM

## 2018-10-24 PROCEDURE — 80048 BASIC METABOLIC PNL TOTAL CA: CPT

## 2018-10-24 PROCEDURE — 85379 FIBRIN DEGRADATION QUANT: CPT

## 2018-10-24 RX ORDER — ACETAMINOPHEN 500 MG
1000 TABLET ORAL EVERY 6 HOURS PRN
Qty: 30 TABLET | Refills: 0 | Status: ON HOLD | OUTPATIENT
Start: 2018-10-24 | End: 2019-01-14 | Stop reason: HOSPADM

## 2018-10-24 RX ORDER — ACETAMINOPHEN 500 MG
1000 TABLET ORAL ONCE
Status: COMPLETED | OUTPATIENT
Start: 2018-10-24 | End: 2018-10-24

## 2018-10-24 RX ORDER — DIPHENHYDRAMINE HYDROCHLORIDE 50 MG/ML
25 INJECTION INTRAMUSCULAR; INTRAVENOUS ONCE
Status: DISCONTINUED | OUTPATIENT
Start: 2018-10-24 | End: 2018-10-24 | Stop reason: HOSPADM

## 2018-10-24 RX ORDER — ONDANSETRON 4 MG/1
4 TABLET, FILM COATED ORAL EVERY 8 HOURS PRN
Qty: 21 TABLET | Refills: 0 | Status: SHIPPED | OUTPATIENT
Start: 2018-10-24 | End: 2018-11-05

## 2018-10-24 RX ORDER — ONDANSETRON 2 MG/ML
4 INJECTION INTRAMUSCULAR; INTRAVENOUS ONCE
Status: COMPLETED | OUTPATIENT
Start: 2018-10-24 | End: 2018-10-24

## 2018-10-24 RX ORDER — KETOROLAC TROMETHAMINE 30 MG/ML
30 INJECTION, SOLUTION INTRAMUSCULAR; INTRAVENOUS ONCE
Status: COMPLETED | OUTPATIENT
Start: 2018-10-24 | End: 2018-10-24

## 2018-10-24 RX ORDER — 0.9 % SODIUM CHLORIDE 0.9 %
1000 INTRAVENOUS SOLUTION INTRAVENOUS ONCE
Status: COMPLETED | OUTPATIENT
Start: 2018-10-24 | End: 2018-10-24

## 2018-10-24 RX ORDER — ONDANSETRON 4 MG/1
4 TABLET, FILM COATED ORAL ONCE
Status: COMPLETED | OUTPATIENT
Start: 2018-10-24 | End: 2018-10-24

## 2018-10-24 RX ORDER — ONDANSETRON 2 MG/ML
4 INJECTION INTRAMUSCULAR; INTRAVENOUS ONCE
Status: DISCONTINUED | OUTPATIENT
Start: 2018-10-24 | End: 2018-10-24

## 2018-10-24 RX ORDER — DIPHENHYDRAMINE HCL 25 MG
25 TABLET ORAL ONCE
Status: DISCONTINUED | OUTPATIENT
Start: 2018-10-24 | End: 2018-10-24 | Stop reason: HOSPADM

## 2018-10-24 RX ADMIN — ACETAMINOPHEN 1000 MG: 500 TABLET ORAL at 18:30

## 2018-10-24 RX ADMIN — KETOROLAC TROMETHAMINE 30 MG: 30 INJECTION, SOLUTION INTRAMUSCULAR at 20:16

## 2018-10-24 RX ADMIN — ONDANSETRON HYDROCHLORIDE 4 MG: 4 TABLET, FILM COATED ORAL at 18:30

## 2018-10-24 RX ADMIN — ACETAMINOPHEN 1000 MG: 500 TABLET ORAL at 21:18

## 2018-10-24 RX ADMIN — SODIUM CHLORIDE 1000 ML: 9 INJECTION, SOLUTION INTRAVENOUS at 19:27

## 2018-10-24 RX ADMIN — ONDANSETRON 4 MG: 2 INJECTION INTRAMUSCULAR; INTRAVENOUS at 21:19

## 2018-10-24 ASSESSMENT — ENCOUNTER SYMPTOMS
DIARRHEA: 1
NAUSEA: 1
SHORTNESS OF BREATH: 1
WHEEZING: 0
VOMITING: 1
CHEST TIGHTNESS: 1
TROUBLE SWALLOWING: 0
ABDOMINAL PAIN: 1
ABDOMINAL DISTENTION: 0

## 2018-10-24 ASSESSMENT — PAIN DESCRIPTION - LOCATION
LOCATION: CHEST
LOCATION: HEAD;CHEST

## 2018-10-24 ASSESSMENT — PAIN SCALES - GENERAL
PAINLEVEL_OUTOF10: 10

## 2018-10-24 ASSESSMENT — PAIN DESCRIPTION - PAIN TYPE
TYPE: ACUTE PAIN
TYPE: ACUTE PAIN

## 2018-10-24 ASSESSMENT — PAIN DESCRIPTION - ORIENTATION: ORIENTATION: LEFT

## 2018-10-24 ASSESSMENT — PAIN DESCRIPTION - DESCRIPTORS: DESCRIPTORS: PRESSURE

## 2018-10-24 NOTE — ED TRIAGE NOTES
Pt presents to ED with steady gait c/o left sided non radiating pressure like chest pain, shortness of breath and nausea. Pt reports history of blood clots but stopped taking his blood thinners 2 months ago stating he thought he didn't need them anymore, pt is c/o right calf pain, no redness or swelling noted. Pt placed on full cardiac monitor and continuous pulse oximetry. Cardiac workup initiated.

## 2018-10-24 NOTE — ED PROVIDER NOTES
[address] [facility]     Emergency Department     Faculty Attestation    I performed a history and physical examination of the patient and discussed management with the resident. I reviewed the residents note and agree with the documented findings and plan of care. Any areas of disagreement are noted on the chart. I was personally present for the key portions of any procedures. I have documented in the chart those procedures where I was not present during the key portions. I have reviewed the emergency nurses triage note. I agree with the chief complaint, past medical history, past surgical history, allergies, medications, social and family history as documented unless otherwise noted below. For Physician Assistant/ Nurse Practitioner cases/documentation I have personally evaluated this patient and have completed at least one if not all key elements of the E/M (history, physical exam, and MDM). Additional findings are as noted. I have personally seen and evaluated the patient. I find the patient's history and physical exam are consistent with the NP/PA documentation. I agree with the care provided, treatment rendered, disposition and follow-up plan. Reports both chest and abdominal di associated nausea vomiting she states history of PE in the past     Lungs are clear heart regular rate abdomen is soft with diffuse tender      EKG Interpretation    Interpreted by emergency department physician    Rhythm: normal sinus   Rate:tachcardia   Axis: normal  Conduction: normal  ST Segments: no acute change  T Waves: no acute change  Q Waves: no acute change    Clinical Impression:  nonspecific EKG. ro pe cards , Dr Sammy Lopez will follow       Jonah Mejia M.D.   Attending Emergency  Physician              Gwen Shen MD  10/24/18 [id number]

## 2018-10-24 NOTE — ED PROVIDER NOTES
Diabetes Brother         Aunt and uncles    Depression Brother     Mental Illness Brother     Other Brother         epilepsy    Migraines Brother     Stroke Other         Uncle    Other Brother         murdered Oct 6th, 2014    Colon Cancer Paternal Cousin 43    Other Sister         epilepsy    Migraines Sister         Allergies:  Geodon [ziprasidone hcl]; Haldol [haloperidol]; Iv dye [iodides]; Bentyl [dicyclomine hcl]; Dicyclomine; Famotidine; and Flagyl [metronidazole]    Home Medications:  Prior to Admission medications    Medication Sig Start Date End Date Taking? Authorizing Provider   acetaminophen (APAP EXTRA STRENGTH) 500 MG tablet Take 2 tablets by mouth every 6 hours as needed for Pain 10/24/18  Yes Shari Base, DPM   ondansetron (ZOFRAN) 4 MG tablet Take 1 tablet by mouth every 8 hours as needed for Nausea or Vomiting 10/24/18  Yes Shari Base, DPM   albuterol sulfate HFA (VENTOLIN HFA) 108 (90 Base) MCG/ACT inhaler Inhale 2 puffs into the lungs every 6 hours as needed for Wheezing 10/1/18   Oral PINK MD   Fluticasone Furoate-Vilanterol (BREO ELLIPTA) 200-25 MCG/INH AEPB Inhale 1 puff into the lungs daily 10/1/18   Oral PINK MD   gabapentin (NEURONTIN) 300 MG capsule Take 1 capsule by mouth 3 times daily for 30 days. . 10/1/18 10/31/18  Isreal Avila MD   buPROPion (WELLBUTRIN XL) 150 MG extended release tablet Take 1 tablet by mouth daily 10/2/18   Isreal Avila MD   DULoxetine (CYMBALTA) 30 MG extended release capsule Take 1 capsule by mouth daily 10/2/18   Isreal Avila MD   diphenhydrAMINE (BENADRYL) 25 MG tablet Take 1 tablet by mouth every 8 hours as needed for Itching 10/1/18 10/31/18  Isreal Avila MD   ARIPiprazole (ABILIFY) 20 MG tablet Take 1 tablet by mouth daily 10/2/18   Isreal Avila MD   QUEtiapine (SEROQUEL) 100 MG tablet Take 1 tablet by mouth nightly 10/1/18   Isreal Avila MD   predniSONE (DELTASONE) 5 MG mis-transcribed.)      Tima Lyons, MAKI  10/24/18 [id number]

## 2018-10-24 NOTE — ED NOTES
Pt resting in bed with eyes closed, NAD noted, visible chest rise, remains on monitor. Call light within reach, will continue to monitor.      Kenny Orourke RN  10/24/18 [id number]

## 2018-11-03 ENCOUNTER — APPOINTMENT (OUTPATIENT)
Dept: GENERAL RADIOLOGY | Age: 28
End: 2018-11-03
Payer: MEDICARE

## 2018-11-03 ENCOUNTER — HOSPITAL ENCOUNTER (EMERGENCY)
Age: 28
Discharge: LEFT BEFORE DISCHARGE | End: 2018-11-03
Attending: EMERGENCY MEDICINE
Payer: MEDICARE

## 2018-11-03 VITALS
HEART RATE: 98 BPM | DIASTOLIC BLOOD PRESSURE: 97 MMHG | SYSTOLIC BLOOD PRESSURE: 150 MMHG | RESPIRATION RATE: 16 BRPM | TEMPERATURE: 98.7 F | OXYGEN SATURATION: 98 %

## 2018-11-03 DIAGNOSIS — R10.84 GENERALIZED ABDOMINAL PAIN: Primary | ICD-10-CM

## 2018-11-03 PROCEDURE — 99284 EMERGENCY DEPT VISIT MOD MDM: CPT

## 2018-11-03 RX ORDER — ONDANSETRON 2 MG/ML
4 INJECTION INTRAMUSCULAR; INTRAVENOUS ONCE
Status: DISCONTINUED | OUTPATIENT
Start: 2018-11-03 | End: 2018-11-03 | Stop reason: HOSPADM

## 2018-11-03 RX ORDER — KETOROLAC TROMETHAMINE 30 MG/ML
30 INJECTION, SOLUTION INTRAMUSCULAR; INTRAVENOUS ONCE
Status: DISCONTINUED | OUTPATIENT
Start: 2018-11-03 | End: 2018-11-03 | Stop reason: HOSPADM

## 2018-11-03 ASSESSMENT — PAIN DESCRIPTION - PAIN TYPE: TYPE: CHRONIC PAIN

## 2018-11-03 ASSESSMENT — PAIN DESCRIPTION - FREQUENCY: FREQUENCY: CONTINUOUS

## 2018-11-03 ASSESSMENT — PAIN DESCRIPTION - LOCATION: LOCATION: ABDOMEN

## 2018-11-03 ASSESSMENT — PAIN SCALES - GENERAL: PAINLEVEL_OUTOF10: 10

## 2018-11-04 ASSESSMENT — ENCOUNTER SYMPTOMS
SHORTNESS OF BREATH: 1
COLOR CHANGE: 0
ABDOMINAL PAIN: 1
NAUSEA: 1
DIARRHEA: 1
CHEST TIGHTNESS: 0
EYE DISCHARGE: 0
VOMITING: 1
EYE REDNESS: 0

## 2018-11-05 ENCOUNTER — APPOINTMENT (OUTPATIENT)
Dept: CT IMAGING | Age: 28
End: 2018-11-05
Payer: MEDICARE

## 2018-11-05 ENCOUNTER — APPOINTMENT (OUTPATIENT)
Dept: GENERAL RADIOLOGY | Age: 28
End: 2018-11-05
Payer: MEDICARE

## 2018-11-05 ENCOUNTER — HOSPITAL ENCOUNTER (EMERGENCY)
Age: 28
Discharge: HOME | End: 2018-11-05
Attending: EMERGENCY MEDICINE
Payer: MEDICARE

## 2018-11-05 VITALS
WEIGHT: 240 LBS | SYSTOLIC BLOOD PRESSURE: 136 MMHG | RESPIRATION RATE: 18 BRPM | TEMPERATURE: 98.8 F | DIASTOLIC BLOOD PRESSURE: 83 MMHG | BODY MASS INDEX: 35.55 KG/M2 | OXYGEN SATURATION: 97 % | HEART RATE: 104 BPM | HEIGHT: 69 IN

## 2018-11-05 DIAGNOSIS — R10.32 LEFT LOWER QUADRANT PAIN: ICD-10-CM

## 2018-11-05 DIAGNOSIS — R11.2 NON-INTRACTABLE VOMITING WITH NAUSEA, UNSPECIFIED VOMITING TYPE: Primary | ICD-10-CM

## 2018-11-05 LAB
ABSOLUTE EOS #: 0.18 K/UL (ref 0–0.44)
ABSOLUTE IMMATURE GRANULOCYTE: <0.03 K/UL (ref 0–0.3)
ABSOLUTE LYMPH #: 2.57 K/UL (ref 1.1–3.7)
ABSOLUTE MONO #: 0.51 K/UL (ref 0.1–1.2)
ALBUMIN SERPL-MCNC: 4.3 G/DL (ref 3.5–5.2)
ALBUMIN/GLOBULIN RATIO: 1.3 (ref 1–2.5)
ALP BLD-CCNC: 67 U/L (ref 40–129)
ALT SERPL-CCNC: 62 U/L (ref 5–41)
ANION GAP SERPL CALCULATED.3IONS-SCNC: 13 MMOL/L (ref 9–17)
AST SERPL-CCNC: 49 U/L
BASOPHILS # BLD: 0 % (ref 0–2)
BASOPHILS ABSOLUTE: <0.03 K/UL (ref 0–0.2)
BILIRUB SERPL-MCNC: <0.1 MG/DL (ref 0.3–1.2)
BUN BLDV-MCNC: 10 MG/DL (ref 6–20)
BUN/CREAT BLD: ABNORMAL (ref 9–20)
CALCIUM SERPL-MCNC: 9.2 MG/DL (ref 8.6–10.4)
CHLORIDE BLD-SCNC: 103 MMOL/L (ref 98–107)
CO2: 26 MMOL/L (ref 20–31)
CREAT SERPL-MCNC: 0.76 MG/DL (ref 0.7–1.2)
DIFFERENTIAL TYPE: NORMAL
EKG ATRIAL RATE: 102 BPM
EKG P AXIS: 43 DEGREES
EKG P-R INTERVAL: 134 MS
EKG Q-T INTERVAL: 316 MS
EKG QRS DURATION: 78 MS
EKG QTC CALCULATION (BAZETT): 411 MS
EKG R AXIS: 47 DEGREES
EKG T AXIS: 36 DEGREES
EKG VENTRICULAR RATE: 102 BPM
EOSINOPHILS RELATIVE PERCENT: 3 % (ref 1–4)
GFR AFRICAN AMERICAN: >60 ML/MIN
GFR NON-AFRICAN AMERICAN: >60 ML/MIN
GFR SERPL CREATININE-BSD FRML MDRD: ABNORMAL ML/MIN/{1.73_M2}
GFR SERPL CREATININE-BSD FRML MDRD: ABNORMAL ML/MIN/{1.73_M2}
GLUCOSE BLD-MCNC: 96 MG/DL (ref 70–99)
HCT VFR BLD CALC: 41.7 % (ref 40.7–50.3)
HEMOGLOBIN: 13.4 G/DL (ref 13–17)
IMMATURE GRANULOCYTES: 0 %
LACTIC ACID, WHOLE BLOOD: 2.4 MMOL/L (ref 0.7–2.1)
LIPASE: 21 U/L (ref 13–60)
LYMPHOCYTES # BLD: 40 % (ref 24–43)
MCH RBC QN AUTO: 29.1 PG (ref 25.2–33.5)
MCHC RBC AUTO-ENTMCNC: 32.1 G/DL (ref 28.4–34.8)
MCV RBC AUTO: 90.5 FL (ref 82.6–102.9)
MONOCYTES # BLD: 8 % (ref 3–12)
NRBC AUTOMATED: 0 PER 100 WBC
PDW BLD-RTO: 12.7 % (ref 11.8–14.4)
PLATELET # BLD: 296 K/UL (ref 138–453)
PLATELET ESTIMATE: NORMAL
PMV BLD AUTO: 10.3 FL (ref 8.1–13.5)
POTASSIUM SERPL-SCNC: 4.3 MMOL/L (ref 3.7–5.3)
RBC # BLD: 4.61 M/UL (ref 4.21–5.77)
RBC # BLD: NORMAL 10*6/UL
SEG NEUTROPHILS: 49 % (ref 36–65)
SEGMENTED NEUTROPHILS ABSOLUTE COUNT: 3.22 K/UL (ref 1.5–8.1)
SODIUM BLD-SCNC: 142 MMOL/L (ref 135–144)
TOTAL PROTEIN: 7.6 G/DL (ref 6.4–8.3)
TROPONIN INTERP: NORMAL
TROPONIN T: <0.03 NG/ML
WBC # BLD: 6.5 K/UL (ref 3.5–11.3)
WBC # BLD: NORMAL 10*3/UL

## 2018-11-05 PROCEDURE — 71046 X-RAY EXAM CHEST 2 VIEWS: CPT

## 2018-11-05 PROCEDURE — 83690 ASSAY OF LIPASE: CPT

## 2018-11-05 PROCEDURE — 6360000002 HC RX W HCPCS: Performed by: EMERGENCY MEDICINE

## 2018-11-05 PROCEDURE — 2580000003 HC RX 258: Performed by: EMERGENCY MEDICINE

## 2018-11-05 PROCEDURE — 84484 ASSAY OF TROPONIN QUANT: CPT

## 2018-11-05 PROCEDURE — 85025 COMPLETE CBC W/AUTO DIFF WBC: CPT

## 2018-11-05 PROCEDURE — 96375 TX/PRO/DX INJ NEW DRUG ADDON: CPT

## 2018-11-05 PROCEDURE — 80053 COMPREHEN METABOLIC PANEL: CPT

## 2018-11-05 PROCEDURE — 96374 THER/PROPH/DIAG INJ IV PUSH: CPT

## 2018-11-05 PROCEDURE — 99285 EMERGENCY DEPT VISIT HI MDM: CPT

## 2018-11-05 PROCEDURE — 93005 ELECTROCARDIOGRAM TRACING: CPT

## 2018-11-05 PROCEDURE — 83605 ASSAY OF LACTIC ACID: CPT

## 2018-11-05 RX ORDER — 0.9 % SODIUM CHLORIDE 0.9 %
1000 INTRAVENOUS SOLUTION INTRAVENOUS ONCE
Status: COMPLETED | OUTPATIENT
Start: 2018-11-05 | End: 2018-11-05

## 2018-11-05 RX ORDER — ONDANSETRON 2 MG/ML
4 INJECTION INTRAMUSCULAR; INTRAVENOUS ONCE
Status: COMPLETED | OUTPATIENT
Start: 2018-11-05 | End: 2018-11-05

## 2018-11-05 RX ORDER — KETOROLAC TROMETHAMINE 30 MG/ML
30 INJECTION, SOLUTION INTRAMUSCULAR; INTRAVENOUS ONCE
Status: COMPLETED | OUTPATIENT
Start: 2018-11-05 | End: 2018-11-05

## 2018-11-05 RX ORDER — DIPHENHYDRAMINE HCL 25 MG
25 TABLET ORAL ONCE
Status: DISCONTINUED | OUTPATIENT
Start: 2018-11-05 | End: 2018-11-05 | Stop reason: HOSPADM

## 2018-11-05 RX ADMIN — ONDANSETRON 4 MG: 2 INJECTION INTRAMUSCULAR; INTRAVENOUS at 19:26

## 2018-11-05 RX ADMIN — SODIUM CHLORIDE 1000 ML: 9 INJECTION, SOLUTION INTRAVENOUS at 18:31

## 2018-11-05 RX ADMIN — KETOROLAC TROMETHAMINE 30 MG: 30 INJECTION, SOLUTION INTRAMUSCULAR at 21:27

## 2018-11-05 ASSESSMENT — PAIN DESCRIPTION - DIRECTION: RADIATING_TOWARDS: LEFT ARM

## 2018-11-05 ASSESSMENT — ENCOUNTER SYMPTOMS
ABDOMINAL PAIN: 1
EYE PAIN: 0
VOMITING: 1
COUGH: 0
DIARRHEA: 1
COLOR CHANGE: 0
SHORTNESS OF BREATH: 0
RHINORRHEA: 0
NAUSEA: 1
SORE THROAT: 0

## 2018-11-05 ASSESSMENT — PAIN DESCRIPTION - ORIENTATION: ORIENTATION: OTHER (COMMENT)

## 2018-11-05 ASSESSMENT — PAIN DESCRIPTION - FREQUENCY: FREQUENCY: INTERMITTENT

## 2018-11-05 ASSESSMENT — PAIN SCALES - GENERAL
PAINLEVEL_OUTOF10: 9
PAINLEVEL_OUTOF10: 9

## 2018-11-05 ASSESSMENT — PAIN DESCRIPTION - PAIN TYPE: TYPE: ACUTE PAIN

## 2018-11-05 ASSESSMENT — PAIN DESCRIPTION - LOCATION: LOCATION: CHEST;ABDOMEN

## 2018-11-05 ASSESSMENT — PAIN DESCRIPTION - DESCRIPTORS: DESCRIPTORS: SHARP

## 2018-11-06 NOTE — ED PROVIDER NOTES
[address]  ED  Emergency Department Encounter  EmergencyMedicine Resident     Pt Cory Lamin [place]  MRN: [id number]  [place] 1990  Date of evaluation: 11/5/18  PCP:  No primary care provider on file. CHIEF COMPLAINT       Chief Complaint   Patient presents with    Abdominal Pain     C/O ABD pain for last few days reports emesis black     Chest Pain     C/O Chest pain x 1 month        HISTORY OF PRESENT ILLNESS  (Location/Symptom, Timing/Onset, Context/Setting, Quality, Duration, Modifying Factors, Severity.)      Sharron Merrill is a 29 y.o. male who presents with Multiple complaints. Patient complaining of left-sided chest pain it's been ongoing for the past month. Also complaining of associated nausea, vomiting diarrhea. Recently, patient complaining of left lower quadrant abdominal pain. States that it's constant, nonradiating, 7 out of 10 in severity. Denies any fever, chills. Denies any shortness of breath. Patient cannot identify any precipitating events. PAST MEDICAL / SURGICAL / SOCIAL / FAMILY HISTORY      has a past medical history of Anxiety; Arthritis; Asthma; Bipolar I disorder, most recent episode (or current) depressed, unspecified; Clostridium difficile infection; COPD (chronic obstructive pulmonary disease) ([address] Depression; Disease of blood and blood forming organ; Eczema; Fracture, metacarpal; Gastric ulcer; Gastritis; GERD (gastroesophageal reflux disease); GI bleed; H. pylori infection; H/O blood clots; Head injury; Headache; Insomnia; Juvenile rheumatoid arthritis ([address] Neuromuscular disorder ([address] PFAPA syndrome ([address] PUD (peptic ulcer disease); Rheumatoid arthritis ([address] Rheumatoid arthritis(714.0); Sleep apnea; 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[address] Substance abuse ([address] Suicidal ideation; Suicide attempt by hanging [facility]); Tobacco dependence; Ulcerative colitis ([address] and UTI (urinary tract infection).      has a past surgical history that includes

## 2018-12-06 ENCOUNTER — HOSPITAL ENCOUNTER (EMERGENCY)
Age: 28
Discharge: HOME | End: 2018-12-06
Attending: EMERGENCY MEDICINE
Payer: COMMERCIAL

## 2018-12-06 VITALS
BODY MASS INDEX: 35.44 KG/M2 | RESPIRATION RATE: 18 BRPM | DIASTOLIC BLOOD PRESSURE: 94 MMHG | SYSTOLIC BLOOD PRESSURE: 154 MMHG | TEMPERATURE: 97.7 F | HEART RATE: 109 BPM | WEIGHT: 240 LBS | OXYGEN SATURATION: 100 %

## 2018-12-06 DIAGNOSIS — F41.1 ANXIETY STATE: Primary | ICD-10-CM

## 2018-12-06 PROCEDURE — 99283 EMERGENCY DEPT VISIT LOW MDM: CPT

## 2018-12-06 ASSESSMENT — ENCOUNTER SYMPTOMS
CONSTIPATION: 0
COUGH: 0
TROUBLE SWALLOWING: 0
COLOR CHANGE: 0
VOMITING: 0
DIARRHEA: 0
CHEST TIGHTNESS: 1
ABDOMINAL PAIN: 1
EYE REDNESS: 0
NAUSEA: 1
SHORTNESS OF BREATH: 0

## 2018-12-06 ASSESSMENT — PAIN DESCRIPTION - ORIENTATION: ORIENTATION: MID

## 2018-12-06 ASSESSMENT — PAIN DESCRIPTION - LOCATION: LOCATION: ABDOMEN

## 2018-12-06 ASSESSMENT — PAIN DESCRIPTION - FREQUENCY: FREQUENCY: CONTINUOUS

## 2018-12-06 ASSESSMENT — PAIN DESCRIPTION - DESCRIPTORS: DESCRIPTORS: ACHING

## 2018-12-06 ASSESSMENT — PAIN DESCRIPTION - PROGRESSION: CLINICAL_PROGRESSION: GRADUALLY WORSENING

## 2018-12-06 ASSESSMENT — PAIN DESCRIPTION - PAIN TYPE: TYPE: ACUTE PAIN

## 2018-12-06 ASSESSMENT — PAIN SCALES - GENERAL: PAINLEVEL_OUTOF10: 10

## 2018-12-06 ASSESSMENT — PAIN DESCRIPTION - ONSET: ONSET: ON-GOING

## 2018-12-06 NOTE — ED PROVIDER NOTES
exhibits no distension and no mass. There is no tenderness. There is no guarding. Musculoskeletal: Normal range of motion. He exhibits no edema, tenderness or deformity. Neurological: He is alert and oriented to person, place, and time. He displays normal reflexes. No cranial nerve deficit or sensory deficit. He exhibits normal muscle tone. Coordination normal.   Skin: Skin is warm and dry. No rash noted. He is not diaphoretic. No erythema. DIFFERENTIAL  DIAGNOSIS     PLAN (LABS / IMAGING / EKG):  No orders of the defined types were placed in this encounter. MEDICATIONS ORDERED:  No orders of the defined types were placed in this encounter. DDX: Schizoaffective disorder, depression, concern for unspecified medical complaint    DIAGNOSTIC RESULTS / [address] [place] [address] / [facility]     LABS:  No results found for this visit on 12/06/18. IMPRESSION: 29year old male presents with vague, non-specific complaints, changing story and symptoms multiple times throughout H&P. Pt anxious appearing, physical exam otherwise unremarkable. Multiple missed appointments during previous attempts to provide establishment with PCP, impression is concern for medical condition, unspecified. RADIOLOGY:  None    EKG  None    All EKG's are interpreted by the Emergency Department Physician who either signs or Co-signs this chart in the absence of a cardiologist.    EMERGENCY DEPARTMENT COURSE:    42-year-old male presents to ED with multiple vague complaints. States symptoms began on taxi ride to the emergency department. Upon questioning of symptoms occurring to cause him to call taxi for transport to the ED, patient unable to specify initial concerns. Reports multiple vague complaints including abdominal pain, headache, concern for poisoning. Patient unable to specify when symptoms began or specific course of symptoms. Physical exam shows anxious appearing 42-year-old male in no signs of acute distress.

## 2018-12-23 ENCOUNTER — HOSPITAL ENCOUNTER (EMERGENCY)
Age: 28
Discharge: HOME | End: 2018-12-23
Attending: EMERGENCY MEDICINE
Payer: MEDICARE

## 2018-12-23 ENCOUNTER — APPOINTMENT (OUTPATIENT)
Dept: GENERAL RADIOLOGY | Age: 28
End: 2018-12-23
Payer: MEDICARE

## 2018-12-23 VITALS
OXYGEN SATURATION: 97 % | SYSTOLIC BLOOD PRESSURE: 110 MMHG | TEMPERATURE: 98.1 F | HEART RATE: 74 BPM | RESPIRATION RATE: 18 BRPM | WEIGHT: 240 LBS | DIASTOLIC BLOOD PRESSURE: 60 MMHG | BODY MASS INDEX: 35.44 KG/M2

## 2018-12-23 DIAGNOSIS — E87.6 HYPOKALEMIA: ICD-10-CM

## 2018-12-23 DIAGNOSIS — R07.9 CHEST PAIN, UNSPECIFIED TYPE: Primary | ICD-10-CM

## 2018-12-23 LAB
ABSOLUTE EOS #: 0.23 K/UL (ref 0–0.44)
ABSOLUTE IMMATURE GRANULOCYTE: <0.03 K/UL (ref 0–0.3)
ABSOLUTE LYMPH #: 2.61 K/UL (ref 1.1–3.7)
ABSOLUTE MONO #: 0.56 K/UL (ref 0.1–1.2)
ANION GAP SERPL CALCULATED.3IONS-SCNC: 11 MMOL/L (ref 9–17)
BASOPHILS # BLD: 1 % (ref 0–2)
BASOPHILS ABSOLUTE: 0.03 K/UL (ref 0–0.2)
BNP INTERPRETATION: NORMAL
BUN BLDV-MCNC: 9 MG/DL (ref 6–20)
BUN/CREAT BLD: ABNORMAL (ref 9–20)
CALCIUM SERPL-MCNC: 9.3 MG/DL (ref 8.6–10.4)
CHLORIDE BLD-SCNC: 101 MMOL/L (ref 98–107)
CO2: 26 MMOL/L (ref 20–31)
CREAT SERPL-MCNC: 0.84 MG/DL (ref 0.7–1.2)
D-DIMER QUANTITATIVE: <0.17 MG/L FEU
DIFFERENTIAL TYPE: ABNORMAL
EKG ATRIAL RATE: 67 BPM
EKG P AXIS: 13 DEGREES
EKG P-R INTERVAL: 136 MS
EKG Q-T INTERVAL: 378 MS
EKG QRS DURATION: 92 MS
EKG QTC CALCULATION (BAZETT): 399 MS
EKG R AXIS: 57 DEGREES
EKG T AXIS: 25 DEGREES
EKG VENTRICULAR RATE: 67 BPM
EOSINOPHILS RELATIVE PERCENT: 4 % (ref 1–4)
GFR AFRICAN AMERICAN: >60 ML/MIN
GFR NON-AFRICAN AMERICAN: >60 ML/MIN
GFR SERPL CREATININE-BSD FRML MDRD: ABNORMAL ML/MIN/{1.73_M2}
GFR SERPL CREATININE-BSD FRML MDRD: ABNORMAL ML/MIN/{1.73_M2}
GLUCOSE BLD-MCNC: 118 MG/DL (ref 70–99)
HCT VFR BLD CALC: 40.9 % (ref 40.7–50.3)
HEMOGLOBIN: 14 G/DL (ref 13–17)
IMMATURE GRANULOCYTES: 0 %
LYMPHOCYTES # BLD: 44 % (ref 24–43)
MCH RBC QN AUTO: 29.1 PG (ref 25.2–33.5)
MCHC RBC AUTO-ENTMCNC: 34.2 G/DL (ref 28.4–34.8)
MCV RBC AUTO: 85 FL (ref 82.6–102.9)
MONOCYTES # BLD: 9 % (ref 3–12)
NRBC AUTOMATED: 0 PER 100 WBC
PDW BLD-RTO: 12.4 % (ref 11.8–14.4)
PLATELET # BLD: 223 K/UL (ref 138–453)
PLATELET ESTIMATE: ABNORMAL
PMV BLD AUTO: 10.4 FL (ref 8.1–13.5)
POTASSIUM SERPL-SCNC: 3.3 MMOL/L (ref 3.7–5.3)
PRO-BNP: <20 PG/ML
RBC # BLD: 4.81 M/UL (ref 4.21–5.77)
RBC # BLD: ABNORMAL 10*6/UL
SEG NEUTROPHILS: 42 % (ref 36–65)
SEGMENTED NEUTROPHILS ABSOLUTE COUNT: 2.53 K/UL (ref 1.5–8.1)
SODIUM BLD-SCNC: 138 MMOL/L (ref 135–144)
TROPONIN INTERP: NORMAL
TROPONIN INTERP: NORMAL
TROPONIN T: NORMAL NG/ML
TROPONIN T: NORMAL NG/ML
TROPONIN, HIGH SENSITIVITY: <6 NG/L (ref 0–22)
TROPONIN, HIGH SENSITIVITY: <6 NG/L (ref 0–22)
WBC # BLD: 6 K/UL (ref 3.5–11.3)
WBC # BLD: ABNORMAL 10*3/UL

## 2018-12-23 PROCEDURE — 80048 BASIC METABOLIC PNL TOTAL CA: CPT

## 2018-12-23 PROCEDURE — 85379 FIBRIN DEGRADATION QUANT: CPT

## 2018-12-23 PROCEDURE — 83880 ASSAY OF NATRIURETIC PEPTIDE: CPT

## 2018-12-23 PROCEDURE — 6370000000 HC RX 637 (ALT 250 FOR IP): Performed by: EMERGENCY MEDICINE

## 2018-12-23 PROCEDURE — 93005 ELECTROCARDIOGRAM TRACING: CPT

## 2018-12-23 PROCEDURE — 71046 X-RAY EXAM CHEST 2 VIEWS: CPT

## 2018-12-23 PROCEDURE — G0384 LEV 5 HOSP TYPE B ED VISIT: HCPCS

## 2018-12-23 PROCEDURE — 2580000003 HC RX 258: Performed by: EMERGENCY MEDICINE

## 2018-12-23 PROCEDURE — 85025 COMPLETE CBC W/AUTO DIFF WBC: CPT

## 2018-12-23 PROCEDURE — 84484 ASSAY OF TROPONIN QUANT: CPT

## 2018-12-23 PROCEDURE — 6360000002 HC RX W HCPCS: Performed by: EMERGENCY MEDICINE

## 2018-12-23 RX ORDER — ASPIRIN 81 MG/1
324 TABLET, CHEWABLE ORAL ONCE
Status: DISCONTINUED | OUTPATIENT
Start: 2018-12-23 | End: 2018-12-23 | Stop reason: HOSPADM

## 2018-12-23 RX ORDER — ONDANSETRON 4 MG/1
4 TABLET, FILM COATED ORAL ONCE
Status: COMPLETED | OUTPATIENT
Start: 2018-12-23 | End: 2018-12-23

## 2018-12-23 RX ORDER — DIPHENHYDRAMINE HCL 25 MG
25 TABLET ORAL ONCE
Status: COMPLETED | OUTPATIENT
Start: 2018-12-23 | End: 2018-12-23

## 2018-12-23 RX ORDER — POTASSIUM CHLORIDE 20 MEQ/1
40 TABLET, EXTENDED RELEASE ORAL ONCE
Status: COMPLETED | OUTPATIENT
Start: 2018-12-23 | End: 2018-12-23

## 2018-12-23 RX ORDER — 0.9 % SODIUM CHLORIDE 0.9 %
1000 INTRAVENOUS SOLUTION INTRAVENOUS ONCE
Status: COMPLETED | OUTPATIENT
Start: 2018-12-23 | End: 2018-12-23

## 2018-12-23 RX ADMIN — SODIUM CHLORIDE 1000 ML: 9 INJECTION, SOLUTION INTRAVENOUS at 10:15

## 2018-12-23 RX ADMIN — ONDANSETRON HYDROCHLORIDE 4 MG: 4 TABLET, FILM COATED ORAL at 11:15

## 2018-12-23 RX ADMIN — POTASSIUM CHLORIDE 40 MEQ: 20 TABLET, EXTENDED RELEASE ORAL at 11:15

## 2018-12-23 RX ADMIN — DIPHENHYDRAMINE HCL 25 MG: 25 TABLET ORAL at 11:15

## 2018-12-23 ASSESSMENT — ENCOUNTER SYMPTOMS
COUGH: 0
NAUSEA: 1
SHORTNESS OF BREATH: 1
SORE THROAT: 0
VOMITING: 0
ABDOMINAL PAIN: 0

## 2018-12-23 ASSESSMENT — PAIN DESCRIPTION - LOCATION: LOCATION: CHEST

## 2018-12-23 ASSESSMENT — PAIN SCALES - GENERAL: PAINLEVEL_OUTOF10: 9

## 2018-12-23 ASSESSMENT — PAIN DESCRIPTION - PAIN TYPE: TYPE: ACUTE PAIN

## 2018-12-23 NOTE — ED PROVIDER NOTES
[facility]  ED  Emergency Department Encounter  Emergency Medicine Resident     Pt Name: Sha Lara  MRN: [id number]  [place] 1990  Date ofevaluation: 12/23/18  PCP:  No primary care provider on file. CHIEF COMPLAINT       Chief Complaint   Patient presents with    Chest Pain     pt c/o left sided CP since this am, pt sent from [facility] for medical clearanc    Depression     pt denies Having suicidal ideation with plan at this time       HISTORY OF PRESENT ILLNESS  (Location/Symptom, Timing/Onset, Context/Setting, Quality, Duration, Modifying Factors, Severity.)      Sha Lara is a 29 y.o. male who presents with a chief complaint of chest pain that began this morning after eating breakfast.  Patient reports he ate a bagel. Patient denies any similar episodes of chest pain in the past.  Patient's pain is left-sided, characterized as sharp, with radiation to his back. Patient reports is also feeling somewhat short of breath and having subjective leg swelling. Patient has a history of DVTs, however is not on blood thinners. Patient was [facility] by [facility] for medical evaluation. PAST MEDICAL / SURGICAL / SOCIAL / FAMILY HISTORY      has a past medical history of Anxiety; Arthritis; Asthma; Bipolar I disorder, most recent episode (or current) depressed, unspecified; Clostridium difficile infection; COPD (chronic obstructive pulmonary disease) ([address] Depression; Disease of blood and blood forming organ; Eczema; Fracture, metacarpal; Gastric ulcer; Gastritis; GERD (gastroesophageal reflux disease); GI bleed; H. pylori infection; H/O blood clots; Head injury; Headache; Insomnia; Juvenile rheumatoid arthritis ([address] Neuromuscular disorder ([address] PFAPA syndrome ([address] PUD (peptic ulcer disease); Rheumatoid arthritis ([address] Rheumatoid arthritis(714.0); Sleep apnea; 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[address] Substance abuse ([address] Suicidal ideation; Suicide attempt by hanging [facility]);  Tobacco

## 2018-12-23 NOTE — ED NOTES
Pt given gown to change into, belonging bag, and sheet. Pt placed on cardiac monitor, pulse ox and BP cuff. Alarms on.       Carlos Tim RN  12/23/18 [id number]

## 2018-12-23 NOTE — PROGRESS NOTES
I did not see or evaluate this patient.   They were assigned to me in error    Courtney Ramos MD  Emergency Medicine Resident

## 2018-12-23 NOTE — ED PROVIDER NOTES
[address] [facility]     Emergency Department     Faculty Attestation    I performed a history and physical examination of the patient and discussed management with the resident. I reviewed the residents note and agree with the documented findings and plan of care. Any areas of disagreement are noted on the chart. I was personally present for the key portions of any procedures. I have documented in the chart those procedures where I was not present during the key portions. I have reviewed the emergency nurses triage note. I agree with the chief complaint, past medical history, past surgical history, allergies, medications, social and family history as documented unless otherwise noted below. For Physician Assistant/ Nurse Practitioner cases/documentation I have personally evaluated this patient and have completed at least one if not all key elements of the E/M (history, physical exam, and MDM). Additional findings are as noted. Chest clear,  Heart exam normal , no pain or swelling on examination of the lower extremities , equal pulses both wrists , trachea midline. Abdomen is nontender without pulsatile mass or bruit. Chest pain does radiate \"a little\" to the back , the pain is not pleuritic. Patient appears comfortable in no distress, skin is warm and dry. Awake alert and oriented, not clinically intoxicated, skin warm and dry, no ataxia or nystagmus, no muscle rigidity, cranial nerves intact, cerebellar testing normal, good airway, no meningeal signs.       Stanley Pedraza MD [facility]  Attending Physician     EKG Interpretation    Interpreted by me    Rhythm: normal sinus   Rate: normal  Axis: normal  Ectopy: none  Conduction: normal  ST Segments: Nonspecific changes probably early repolarization  T Waves: no acute change  Q Waves: none    Clinical Impression: no acute changes and normal EKG except for early repolarization     Viri Nolan MD  12/23/18 2014

## 2019-01-03 ENCOUNTER — HOSPITAL ENCOUNTER (INPATIENT)
Age: 29
LOS: 11 days | Discharge: HOME HEALTH | DRG: 885 | End: 2019-01-14
Attending: PSYCHIATRY & NEUROLOGY | Admitting: PSYCHIATRY & NEUROLOGY
Payer: COMMERCIAL

## 2019-01-03 PROBLEM — F25.9 SCHIZOAFFECTIVE DISORDER (HCC): Status: ACTIVE | Noted: 2019-01-03

## 2019-01-03 PROCEDURE — 1240000000 HC EMOTIONAL WELLNESS R&B

## 2019-01-03 RX ORDER — BENZTROPINE MESYLATE 1 MG/ML
2 INJECTION INTRAMUSCULAR; INTRAVENOUS 2 TIMES DAILY PRN
Status: DISCONTINUED | OUTPATIENT
Start: 2019-01-03 | End: 2019-01-14 | Stop reason: HOSPADM

## 2019-01-03 RX ORDER — OLANZAPINE 10 MG/1
10 INJECTION, POWDER, LYOPHILIZED, FOR SOLUTION INTRAMUSCULAR 2 TIMES DAILY PRN
Status: DISCONTINUED | OUTPATIENT
Start: 2019-01-03 | End: 2019-01-14 | Stop reason: HOSPADM

## 2019-01-03 RX ORDER — HYDROXYZINE 50 MG/1
50 TABLET, FILM COATED ORAL 3 TIMES DAILY PRN
Status: DISCONTINUED | OUTPATIENT
Start: 2019-01-03 | End: 2019-01-14 | Stop reason: HOSPADM

## 2019-01-03 RX ORDER — DIPHENHYDRAMINE HYDROCHLORIDE 50 MG/ML
25 INJECTION INTRAMUSCULAR; INTRAVENOUS 2 TIMES DAILY PRN
Status: DISCONTINUED | OUTPATIENT
Start: 2019-01-03 | End: 2019-01-06

## 2019-01-03 RX ORDER — NICOTINE 21 MG/24HR
1 PATCH, TRANSDERMAL 24 HOURS TRANSDERMAL DAILY
Status: DISCONTINUED | OUTPATIENT
Start: 2019-01-03 | End: 2019-01-04

## 2019-01-03 RX ORDER — MAGNESIUM HYDROXIDE/ALUMINUM HYDROXICE/SIMETHICONE 120; 1200; 1200 MG/30ML; MG/30ML; MG/30ML
30 SUSPENSION ORAL EVERY 6 HOURS PRN
Status: DISCONTINUED | OUTPATIENT
Start: 2019-01-03 | End: 2019-01-14 | Stop reason: HOSPADM

## 2019-01-03 RX ORDER — ACETAMINOPHEN 325 MG/1
650 TABLET ORAL EVERY 4 HOURS PRN
Status: DISCONTINUED | OUTPATIENT
Start: 2019-01-03 | End: 2019-01-14 | Stop reason: HOSPADM

## 2019-01-03 RX ORDER — TRAZODONE HYDROCHLORIDE 50 MG/1
50 TABLET ORAL NIGHTLY PRN
Status: DISCONTINUED | OUTPATIENT
Start: 2019-01-03 | End: 2019-01-14 | Stop reason: HOSPADM

## 2019-01-03 ASSESSMENT — PAIN SCALES - GENERAL: PAINLEVEL_OUTOF10: 10

## 2019-01-03 ASSESSMENT — PAIN DESCRIPTION - LOCATION: LOCATION: HEAD;LEG

## 2019-01-03 ASSESSMENT — SLEEP AND FATIGUE QUESTIONNAIRES
DIFFICULTY FALLING ASLEEP: YES
DO YOU HAVE DIFFICULTY SLEEPING: YES
SLEEP PATTERN: INSOMNIA
DO YOU USE A SLEEP AID: NO
DIFFICULTY STAYING ASLEEP: YES
RESTFUL SLEEP: NO
DIFFICULTY ARISING: NO

## 2019-01-03 ASSESSMENT — PAIN DESCRIPTION - ORIENTATION: ORIENTATION: RIGHT;LEFT

## 2019-01-03 ASSESSMENT — LIFESTYLE VARIABLES: HISTORY_ALCOHOL_USE: NO

## 2019-01-04 PROCEDURE — 6370000000 HC RX 637 (ALT 250 FOR IP): Performed by: INTERNAL MEDICINE

## 2019-01-04 PROCEDURE — 6370000000 HC RX 637 (ALT 250 FOR IP): Performed by: PSYCHIATRY & NEUROLOGY

## 2019-01-04 PROCEDURE — 6370000000 HC RX 637 (ALT 250 FOR IP): Performed by: NURSE PRACTITIONER

## 2019-01-04 PROCEDURE — 6360000002 HC RX W HCPCS: Performed by: NURSE PRACTITIONER

## 2019-01-04 PROCEDURE — 99222 1ST HOSP IP/OBS MODERATE 55: CPT | Performed by: INTERNAL MEDICINE

## 2019-01-04 PROCEDURE — 1240000000 HC EMOTIONAL WELLNESS R&B

## 2019-01-04 PROCEDURE — 90792 PSYCH DIAG EVAL W/MED SRVCS: CPT | Performed by: NURSE PRACTITIONER

## 2019-01-04 RX ORDER — FLUTICASONE FUROATE AND VILANTEROL 200; 25 UG/1; UG/1
1 POWDER RESPIRATORY (INHALATION) DAILY
Status: DISCONTINUED | OUTPATIENT
Start: 2019-01-04 | End: 2019-01-04 | Stop reason: CLARIF

## 2019-01-04 RX ORDER — LORAZEPAM 2 MG/ML
1 INJECTION INTRAMUSCULAR EVERY 6 HOURS PRN
Status: DISCONTINUED | OUTPATIENT
Start: 2019-01-04 | End: 2019-01-13 | Stop reason: ALTCHOICE

## 2019-01-04 RX ORDER — PANTOPRAZOLE SODIUM 40 MG/1
40 TABLET, DELAYED RELEASE ORAL
Status: DISCONTINUED | OUTPATIENT
Start: 2019-01-05 | End: 2019-01-14 | Stop reason: HOSPADM

## 2019-01-04 RX ORDER — NICOTINE 21 MG/24HR
1 PATCH, TRANSDERMAL 24 HOURS TRANSDERMAL DAILY
Status: DISCONTINUED | OUTPATIENT
Start: 2019-01-04 | End: 2019-01-14 | Stop reason: HOSPADM

## 2019-01-04 RX ORDER — AMOXICILLIN 500 MG/1
500 CAPSULE ORAL EVERY 8 HOURS SCHEDULED
Status: DISCONTINUED | OUTPATIENT
Start: 2019-01-04 | End: 2019-01-13 | Stop reason: RX

## 2019-01-04 RX ORDER — ARIPIPRAZOLE 20 MG/1
20 TABLET ORAL DAILY
Status: DISCONTINUED | OUTPATIENT
Start: 2019-01-04 | End: 2019-01-07

## 2019-01-04 RX ORDER — ONDANSETRON 4 MG/1
4 TABLET, FILM COATED ORAL EVERY 8 HOURS PRN
Status: DISCONTINUED | OUTPATIENT
Start: 2019-01-04 | End: 2019-01-14 | Stop reason: HOSPADM

## 2019-01-04 RX ORDER — BUPRENORPHINE AND NALOXONE 8; 2 MG/1; MG/1
1 FILM, SOLUBLE BUCCAL; SUBLINGUAL 2 TIMES DAILY
Status: DISCONTINUED | OUTPATIENT
Start: 2019-01-04 | End: 2019-01-14 | Stop reason: HOSPADM

## 2019-01-04 RX ORDER — DIPHENHYDRAMINE HYDROCHLORIDE 50 MG/ML
25 INJECTION INTRAMUSCULAR; INTRAVENOUS EVERY 6 HOURS PRN
Status: DISCONTINUED | OUTPATIENT
Start: 2019-01-04 | End: 2019-01-04

## 2019-01-04 RX ORDER — BUPRENORPHINE AND NALOXONE 8; 2 MG/1; MG/1
1 FILM, SOLUBLE BUCCAL; SUBLINGUAL 2 TIMES DAILY
COMMUNITY
End: 2019-02-13

## 2019-01-04 RX ORDER — ALBUTEROL SULFATE 90 UG/1
2 AEROSOL, METERED RESPIRATORY (INHALATION) EVERY 6 HOURS PRN
Status: DISCONTINUED | OUTPATIENT
Start: 2019-01-04 | End: 2019-01-14 | Stop reason: HOSPADM

## 2019-01-04 RX ADMIN — ARIPIPRAZOLE 20 MG: 20 TABLET ORAL at 10:53

## 2019-01-04 RX ADMIN — BUPRENORPHINE HYDROCHLORIDE, NALOXONE HYDROCHLORIDE 1 FILM: 8; 2 FILM, SOLUBLE BUCCAL; SUBLINGUAL at 21:16

## 2019-01-04 RX ADMIN — ONDANSETRON HYDROCHLORIDE 4 MG: 4 TABLET, FILM COATED ORAL at 12:14

## 2019-01-04 RX ADMIN — HYDROXYZINE HYDROCHLORIDE 50 MG: 50 TABLET, FILM COATED ORAL at 12:14

## 2019-01-04 RX ADMIN — AMOXICILLIN 500 MG: 500 CAPSULE ORAL at 21:16

## 2019-01-04 RX ADMIN — DIPHENHYDRAMINE HYDROCHLORIDE 25 MG: 50 INJECTION INTRAMUSCULAR; INTRAVENOUS at 10:57

## 2019-01-04 RX ADMIN — BUPRENORPHINE HYDROCHLORIDE, NALOXONE HYDROCHLORIDE 1 FILM: 8; 2 FILM, SOLUBLE BUCCAL; SUBLINGUAL at 10:53

## 2019-01-04 RX ADMIN — DIPHENHYDRAMINE HYDROCHLORIDE 25 MG: 50 INJECTION INTRAMUSCULAR; INTRAVENOUS at 21:15

## 2019-01-04 RX ADMIN — MOMETASONE FUROATE AND FORMOTEROL FUMARATE DIHYDRATE 2 PUFF: 200; 5 AEROSOL RESPIRATORY (INHALATION) at 21:15

## 2019-01-04 ASSESSMENT — SLEEP AND FATIGUE QUESTIONNAIRES
RESTFUL SLEEP: NO
DO YOU HAVE DIFFICULTY SLEEPING: YES
AVERAGE NUMBER OF SLEEP HOURS: 8
DIFFICULTY ARISING: NO
DO YOU USE A SLEEP AID: NO
SLEEP PATTERN: DIFFICULTY FALLING ASLEEP;RESTLESSNESS;INSOMNIA
DIFFICULTY FALLING ASLEEP: YES
DIFFICULTY STAYING ASLEEP: YES

## 2019-01-04 ASSESSMENT — PAIN SCALES - GENERAL: PAINLEVEL_OUTOF10: 4

## 2019-01-04 ASSESSMENT — PAIN - FUNCTIONAL ASSESSMENT: PAIN_FUNCTIONAL_ASSESSMENT: 0-10

## 2019-01-04 ASSESSMENT — LIFESTYLE VARIABLES: HISTORY_ALCOHOL_USE: NO

## 2019-01-05 LAB
AMPHETAMINE SCREEN URINE: NEGATIVE
BARBITURATE SCREEN URINE: NEGATIVE
BENZODIAZEPINE SCREEN, URINE: NEGATIVE
BUPRENORPHINE URINE: NORMAL
CANNABINOID SCREEN URINE: NEGATIVE
COCAINE METABOLITE, URINE: NEGATIVE
MDMA URINE: NORMAL
METHADONE SCREEN, URINE: NEGATIVE
METHAMPHETAMINE, URINE: NORMAL
OPIATES, URINE: NEGATIVE
OXYCODONE SCREEN URINE: NEGATIVE
PHENCYCLIDINE, URINE: NEGATIVE
PROPOXYPHENE, URINE: NORMAL
TEST INFORMATION: NORMAL
TRICYCLIC ANTIDEPRESSANTS, UR: NORMAL

## 2019-01-05 PROCEDURE — 6370000000 HC RX 637 (ALT 250 FOR IP): Performed by: NURSE PRACTITIONER

## 2019-01-05 PROCEDURE — 6360000002 HC RX W HCPCS: Performed by: NURSE PRACTITIONER

## 2019-01-05 PROCEDURE — 6370000000 HC RX 637 (ALT 250 FOR IP): Performed by: INTERNAL MEDICINE

## 2019-01-05 PROCEDURE — 99232 SBSQ HOSP IP/OBS MODERATE 35: CPT | Performed by: PSYCHIATRY & NEUROLOGY

## 2019-01-05 PROCEDURE — 6370000000 HC RX 637 (ALT 250 FOR IP): Performed by: PSYCHIATRY & NEUROLOGY

## 2019-01-05 PROCEDURE — 80307 DRUG TEST PRSMV CHEM ANLYZR: CPT

## 2019-01-05 PROCEDURE — 1240000000 HC EMOTIONAL WELLNESS R&B

## 2019-01-05 RX ORDER — GABAPENTIN 100 MG/1
100 CAPSULE ORAL 3 TIMES DAILY
Status: DISCONTINUED | OUTPATIENT
Start: 2019-01-05 | End: 2019-01-14 | Stop reason: HOSPADM

## 2019-01-05 RX ORDER — QUETIAPINE FUMARATE 100 MG/1
100 TABLET, FILM COATED ORAL NIGHTLY
Status: DISCONTINUED | OUTPATIENT
Start: 2019-01-05 | End: 2019-01-06

## 2019-01-05 RX ADMIN — ARIPIPRAZOLE 20 MG: 20 TABLET ORAL at 08:34

## 2019-01-05 RX ADMIN — GABAPENTIN 100 MG: 100 CAPSULE ORAL at 21:49

## 2019-01-05 RX ADMIN — MOMETASONE FUROATE AND FORMOTEROL FUMARATE DIHYDRATE 2 PUFF: 200; 5 AEROSOL RESPIRATORY (INHALATION) at 21:49

## 2019-01-05 RX ADMIN — AMOXICILLIN 500 MG: 500 CAPSULE ORAL at 11:43

## 2019-01-05 RX ADMIN — AMOXICILLIN 500 MG: 500 CAPSULE ORAL at 21:52

## 2019-01-05 RX ADMIN — QUETIAPINE FUMARATE 100 MG: 100 TABLET ORAL at 21:49

## 2019-01-05 RX ADMIN — BUPRENORPHINE HYDROCHLORIDE, NALOXONE HYDROCHLORIDE 1 FILM: 8; 2 FILM, SOLUBLE BUCCAL; SUBLINGUAL at 21:50

## 2019-01-05 RX ADMIN — HYDROXYZINE HYDROCHLORIDE 50 MG: 50 TABLET, FILM COATED ORAL at 21:50

## 2019-01-05 RX ADMIN — MOMETASONE FUROATE AND FORMOTEROL FUMARATE DIHYDRATE 2 PUFF: 200; 5 AEROSOL RESPIRATORY (INHALATION) at 08:34

## 2019-01-05 RX ADMIN — PANTOPRAZOLE SODIUM 40 MG: 40 TABLET, DELAYED RELEASE ORAL at 08:34

## 2019-01-05 RX ADMIN — DIPHENHYDRAMINE HYDROCHLORIDE 25 MG: 50 INJECTION INTRAMUSCULAR; INTRAVENOUS at 21:50

## 2019-01-05 RX ADMIN — ONDANSETRON HYDROCHLORIDE 4 MG: 4 TABLET, FILM COATED ORAL at 21:50

## 2019-01-05 RX ADMIN — BUPRENORPHINE HYDROCHLORIDE, NALOXONE HYDROCHLORIDE 1 FILM: 8; 2 FILM, SOLUBLE BUCCAL; SUBLINGUAL at 08:34

## 2019-01-05 RX ADMIN — AMOXICILLIN 500 MG: 500 CAPSULE ORAL at 06:10

## 2019-01-05 RX ADMIN — DIPHENHYDRAMINE HYDROCHLORIDE 25 MG: 50 INJECTION INTRAMUSCULAR; INTRAVENOUS at 08:35

## 2019-01-05 ASSESSMENT — PAIN SCALES - GENERAL
PAINLEVEL_OUTOF10: 0
PAINLEVEL_OUTOF10: 0

## 2019-01-05 ASSESSMENT — PAIN - FUNCTIONAL ASSESSMENT: PAIN_FUNCTIONAL_ASSESSMENT: 0-10

## 2019-01-06 PROCEDURE — 6360000002 HC RX W HCPCS: Performed by: NURSE PRACTITIONER

## 2019-01-06 PROCEDURE — 99232 SBSQ HOSP IP/OBS MODERATE 35: CPT | Performed by: PSYCHIATRY & NEUROLOGY

## 2019-01-06 PROCEDURE — 1240000000 HC EMOTIONAL WELLNESS R&B

## 2019-01-06 PROCEDURE — 6370000000 HC RX 637 (ALT 250 FOR IP): Performed by: INTERNAL MEDICINE

## 2019-01-06 PROCEDURE — 6370000000 HC RX 637 (ALT 250 FOR IP): Performed by: NURSE PRACTITIONER

## 2019-01-06 PROCEDURE — 6370000000 HC RX 637 (ALT 250 FOR IP): Performed by: PSYCHIATRY & NEUROLOGY

## 2019-01-06 PROCEDURE — 6360000002 HC RX W HCPCS: Performed by: PSYCHIATRY & NEUROLOGY

## 2019-01-06 RX ORDER — DIPHENHYDRAMINE HYDROCHLORIDE 50 MG/ML
50 INJECTION INTRAMUSCULAR; INTRAVENOUS 2 TIMES DAILY PRN
Status: DISCONTINUED | OUTPATIENT
Start: 2019-01-06 | End: 2019-01-14 | Stop reason: HOSPADM

## 2019-01-06 RX ORDER — QUETIAPINE FUMARATE 200 MG/1
200 TABLET, FILM COATED ORAL NIGHTLY
Status: DISCONTINUED | OUTPATIENT
Start: 2019-01-06 | End: 2019-01-07

## 2019-01-06 RX ADMIN — BUPRENORPHINE HYDROCHLORIDE, NALOXONE HYDROCHLORIDE 1 FILM: 8; 2 FILM, SOLUBLE BUCCAL; SUBLINGUAL at 08:43

## 2019-01-06 RX ADMIN — PANTOPRAZOLE SODIUM 40 MG: 40 TABLET, DELAYED RELEASE ORAL at 08:28

## 2019-01-06 RX ADMIN — GABAPENTIN 100 MG: 100 CAPSULE ORAL at 14:17

## 2019-01-06 RX ADMIN — MOMETASONE FUROATE AND FORMOTEROL FUMARATE DIHYDRATE 2 PUFF: 200; 5 AEROSOL RESPIRATORY (INHALATION) at 20:41

## 2019-01-06 RX ADMIN — AMOXICILLIN 500 MG: 500 CAPSULE ORAL at 20:41

## 2019-01-06 RX ADMIN — MOMETASONE FUROATE AND FORMOTEROL FUMARATE DIHYDRATE 2 PUFF: 200; 5 AEROSOL RESPIRATORY (INHALATION) at 08:43

## 2019-01-06 RX ADMIN — QUETIAPINE FUMARATE 200 MG: 200 TABLET ORAL at 20:41

## 2019-01-06 RX ADMIN — DIPHENHYDRAMINE HYDROCHLORIDE 25 MG: 50 INJECTION INTRAMUSCULAR; INTRAVENOUS at 09:54

## 2019-01-06 RX ADMIN — BUPRENORPHINE HYDROCHLORIDE, NALOXONE HYDROCHLORIDE 1 FILM: 8; 2 FILM, SOLUBLE BUCCAL; SUBLINGUAL at 20:41

## 2019-01-06 RX ADMIN — AMOXICILLIN 500 MG: 500 CAPSULE ORAL at 08:28

## 2019-01-06 RX ADMIN — DIPHENHYDRAMINE HYDROCHLORIDE 50 MG: 50 INJECTION, SOLUTION INTRAMUSCULAR; INTRAVENOUS at 20:41

## 2019-01-06 RX ADMIN — AMOXICILLIN 500 MG: 500 CAPSULE ORAL at 14:17

## 2019-01-06 RX ADMIN — GABAPENTIN 100 MG: 100 CAPSULE ORAL at 08:28

## 2019-01-06 RX ADMIN — GABAPENTIN 100 MG: 100 CAPSULE ORAL at 20:41

## 2019-01-06 RX ADMIN — ARIPIPRAZOLE 20 MG: 20 TABLET ORAL at 08:28

## 2019-01-06 ASSESSMENT — PAIN SCALES - GENERAL
PAINLEVEL_OUTOF10: 0
PAINLEVEL_OUTOF10: 0

## 2019-01-07 PROCEDURE — 6370000000 HC RX 637 (ALT 250 FOR IP): Performed by: PSYCHIATRY & NEUROLOGY

## 2019-01-07 PROCEDURE — 6370000000 HC RX 637 (ALT 250 FOR IP): Performed by: INTERNAL MEDICINE

## 2019-01-07 PROCEDURE — 99232 SBSQ HOSP IP/OBS MODERATE 35: CPT | Performed by: PSYCHIATRY & NEUROLOGY

## 2019-01-07 PROCEDURE — 6360000002 HC RX W HCPCS: Performed by: NURSE PRACTITIONER

## 2019-01-07 PROCEDURE — 6360000002 HC RX W HCPCS: Performed by: PSYCHIATRY & NEUROLOGY

## 2019-01-07 PROCEDURE — 6370000000 HC RX 637 (ALT 250 FOR IP): Performed by: NURSE PRACTITIONER

## 2019-01-07 PROCEDURE — 1240000000 HC EMOTIONAL WELLNESS R&B

## 2019-01-07 RX ORDER — QUETIAPINE FUMARATE 300 MG/1
300 TABLET, FILM COATED ORAL NIGHTLY
Status: DISCONTINUED | OUTPATIENT
Start: 2019-01-07 | End: 2019-01-08

## 2019-01-07 RX ADMIN — DIPHENHYDRAMINE HYDROCHLORIDE 50 MG: 50 INJECTION, SOLUTION INTRAMUSCULAR; INTRAVENOUS at 09:17

## 2019-01-07 RX ADMIN — GABAPENTIN 100 MG: 100 CAPSULE ORAL at 08:19

## 2019-01-07 RX ADMIN — BUPRENORPHINE HYDROCHLORIDE, NALOXONE HYDROCHLORIDE 1 FILM: 8; 2 FILM, SOLUBLE BUCCAL; SUBLINGUAL at 21:11

## 2019-01-07 RX ADMIN — AMOXICILLIN 500 MG: 500 CAPSULE ORAL at 21:11

## 2019-01-07 RX ADMIN — BUPRENORPHINE HYDROCHLORIDE, NALOXONE HYDROCHLORIDE 1 FILM: 8; 2 FILM, SOLUBLE BUCCAL; SUBLINGUAL at 08:20

## 2019-01-07 RX ADMIN — AMOXICILLIN 500 MG: 500 CAPSULE ORAL at 14:42

## 2019-01-07 RX ADMIN — ARIPIPRAZOLE 20 MG: 20 TABLET ORAL at 08:19

## 2019-01-07 RX ADMIN — DIPHENHYDRAMINE HYDROCHLORIDE 50 MG: 50 INJECTION, SOLUTION INTRAMUSCULAR; INTRAVENOUS at 21:10

## 2019-01-07 RX ADMIN — QUETIAPINE FUMARATE 300 MG: 300 TABLET ORAL at 21:11

## 2019-01-07 RX ADMIN — AMOXICILLIN 500 MG: 500 CAPSULE ORAL at 07:01

## 2019-01-07 RX ADMIN — MOMETASONE FUROATE AND FORMOTEROL FUMARATE DIHYDRATE 2 PUFF: 200; 5 AEROSOL RESPIRATORY (INHALATION) at 21:10

## 2019-01-07 RX ADMIN — GABAPENTIN 100 MG: 100 CAPSULE ORAL at 21:11

## 2019-01-07 RX ADMIN — PANTOPRAZOLE SODIUM 40 MG: 40 TABLET, DELAYED RELEASE ORAL at 08:20

## 2019-01-07 RX ADMIN — GABAPENTIN 100 MG: 100 CAPSULE ORAL at 14:42

## 2019-01-07 ASSESSMENT — PAIN SCALES - GENERAL
PAINLEVEL_OUTOF10: 1
PAINLEVEL_OUTOF10: 0

## 2019-01-07 ASSESSMENT — PAIN - FUNCTIONAL ASSESSMENT: PAIN_FUNCTIONAL_ASSESSMENT: 0-10

## 2019-01-08 PROCEDURE — 6370000000 HC RX 637 (ALT 250 FOR IP): Performed by: NURSE PRACTITIONER

## 2019-01-08 PROCEDURE — 6370000000 HC RX 637 (ALT 250 FOR IP): Performed by: INTERNAL MEDICINE

## 2019-01-08 PROCEDURE — 6360000002 HC RX W HCPCS: Performed by: NURSE PRACTITIONER

## 2019-01-08 PROCEDURE — 1240000000 HC EMOTIONAL WELLNESS R&B

## 2019-01-08 PROCEDURE — 6360000002 HC RX W HCPCS: Performed by: PSYCHIATRY & NEUROLOGY

## 2019-01-08 PROCEDURE — 6370000000 HC RX 637 (ALT 250 FOR IP): Performed by: PSYCHIATRY & NEUROLOGY

## 2019-01-08 PROCEDURE — 99232 SBSQ HOSP IP/OBS MODERATE 35: CPT | Performed by: REGISTERED NURSE

## 2019-01-08 PROCEDURE — 6370000000 HC RX 637 (ALT 250 FOR IP): Performed by: REGISTERED NURSE

## 2019-01-08 RX ORDER — QUETIAPINE FUMARATE 200 MG/1
400 TABLET, FILM COATED ORAL NIGHTLY
Status: DISCONTINUED | OUTPATIENT
Start: 2019-01-08 | End: 2019-01-10

## 2019-01-08 RX ADMIN — BUPRENORPHINE HYDROCHLORIDE, NALOXONE HYDROCHLORIDE 1 FILM: 8; 2 FILM, SOLUBLE BUCCAL; SUBLINGUAL at 20:44

## 2019-01-08 RX ADMIN — AMOXICILLIN 500 MG: 500 CAPSULE ORAL at 08:35

## 2019-01-08 RX ADMIN — MOMETASONE FUROATE AND FORMOTEROL FUMARATE DIHYDRATE 2 PUFF: 200; 5 AEROSOL RESPIRATORY (INHALATION) at 20:44

## 2019-01-08 RX ADMIN — DIPHENHYDRAMINE HYDROCHLORIDE 50 MG: 50 INJECTION, SOLUTION INTRAMUSCULAR; INTRAVENOUS at 21:16

## 2019-01-08 RX ADMIN — AMOXICILLIN 500 MG: 500 CAPSULE ORAL at 20:44

## 2019-01-08 RX ADMIN — BUPRENORPHINE HYDROCHLORIDE, NALOXONE HYDROCHLORIDE 1 FILM: 8; 2 FILM, SOLUBLE BUCCAL; SUBLINGUAL at 10:00

## 2019-01-08 RX ADMIN — MOMETASONE FUROATE AND FORMOTEROL FUMARATE DIHYDRATE 2 PUFF: 200; 5 AEROSOL RESPIRATORY (INHALATION) at 08:35

## 2019-01-08 RX ADMIN — DIPHENHYDRAMINE HYDROCHLORIDE 50 MG: 50 INJECTION, SOLUTION INTRAMUSCULAR; INTRAVENOUS at 08:44

## 2019-01-08 RX ADMIN — QUETIAPINE FUMARATE 400 MG: 200 TABLET ORAL at 20:44

## 2019-01-08 RX ADMIN — GABAPENTIN 100 MG: 100 CAPSULE ORAL at 08:35

## 2019-01-08 RX ADMIN — GABAPENTIN 100 MG: 100 CAPSULE ORAL at 20:44

## 2019-01-08 ASSESSMENT — PAIN SCALES - GENERAL
PAINLEVEL_OUTOF10: 1
PAINLEVEL_OUTOF10: 0

## 2019-01-09 PROCEDURE — 6370000000 HC RX 637 (ALT 250 FOR IP): Performed by: REGISTERED NURSE

## 2019-01-09 PROCEDURE — 6370000000 HC RX 637 (ALT 250 FOR IP): Performed by: INTERNAL MEDICINE

## 2019-01-09 PROCEDURE — 99232 SBSQ HOSP IP/OBS MODERATE 35: CPT | Performed by: REGISTERED NURSE

## 2019-01-09 PROCEDURE — 6370000000 HC RX 637 (ALT 250 FOR IP): Performed by: NURSE PRACTITIONER

## 2019-01-09 PROCEDURE — 6360000002 HC RX W HCPCS: Performed by: NURSE PRACTITIONER

## 2019-01-09 PROCEDURE — 6370000000 HC RX 637 (ALT 250 FOR IP): Performed by: PSYCHIATRY & NEUROLOGY

## 2019-01-09 PROCEDURE — 1240000000 HC EMOTIONAL WELLNESS R&B

## 2019-01-09 PROCEDURE — 6360000002 HC RX W HCPCS: Performed by: PSYCHIATRY & NEUROLOGY

## 2019-01-09 RX ADMIN — QUETIAPINE FUMARATE 400 MG: 200 TABLET ORAL at 20:46

## 2019-01-09 RX ADMIN — DIPHENHYDRAMINE HYDROCHLORIDE 50 MG: 50 INJECTION, SOLUTION INTRAMUSCULAR; INTRAVENOUS at 09:40

## 2019-01-09 RX ADMIN — MOMETASONE FUROATE AND FORMOTEROL FUMARATE DIHYDRATE 2 PUFF: 200; 5 AEROSOL RESPIRATORY (INHALATION) at 20:45

## 2019-01-09 RX ADMIN — AMOXICILLIN 500 MG: 500 CAPSULE ORAL at 15:13

## 2019-01-09 RX ADMIN — GABAPENTIN 100 MG: 100 CAPSULE ORAL at 15:13

## 2019-01-09 RX ADMIN — BUPRENORPHINE HYDROCHLORIDE, NALOXONE HYDROCHLORIDE 1 FILM: 8; 2 FILM, SOLUBLE BUCCAL; SUBLINGUAL at 20:46

## 2019-01-09 RX ADMIN — GABAPENTIN 100 MG: 100 CAPSULE ORAL at 20:46

## 2019-01-09 RX ADMIN — GABAPENTIN 100 MG: 100 CAPSULE ORAL at 08:29

## 2019-01-09 RX ADMIN — MAGNESIUM HYDROXIDE 30 ML: 400 SUSPENSION ORAL at 09:40

## 2019-01-09 RX ADMIN — BUPRENORPHINE HYDROCHLORIDE, NALOXONE HYDROCHLORIDE 1 FILM: 8; 2 FILM, SOLUBLE BUCCAL; SUBLINGUAL at 08:29

## 2019-01-09 RX ADMIN — AMOXICILLIN 500 MG: 500 CAPSULE ORAL at 08:29

## 2019-01-09 RX ADMIN — DIPHENHYDRAMINE HYDROCHLORIDE 50 MG: 50 INJECTION, SOLUTION INTRAMUSCULAR; INTRAVENOUS at 20:45

## 2019-01-09 RX ADMIN — AMOXICILLIN 500 MG: 500 CAPSULE ORAL at 20:46

## 2019-01-09 ASSESSMENT — PAIN SCALES - GENERAL
PAINLEVEL_OUTOF10: 2
PAINLEVEL_OUTOF10: 3

## 2019-01-10 PROCEDURE — 99232 SBSQ HOSP IP/OBS MODERATE 35: CPT | Performed by: REGISTERED NURSE

## 2019-01-10 PROCEDURE — 6370000000 HC RX 637 (ALT 250 FOR IP): Performed by: PSYCHIATRY & NEUROLOGY

## 2019-01-10 PROCEDURE — 6370000000 HC RX 637 (ALT 250 FOR IP): Performed by: INTERNAL MEDICINE

## 2019-01-10 PROCEDURE — 6360000002 HC RX W HCPCS: Performed by: PSYCHIATRY & NEUROLOGY

## 2019-01-10 PROCEDURE — 1240000000 HC EMOTIONAL WELLNESS R&B

## 2019-01-10 PROCEDURE — 6370000000 HC RX 637 (ALT 250 FOR IP): Performed by: NURSE PRACTITIONER

## 2019-01-10 PROCEDURE — 6360000002 HC RX W HCPCS: Performed by: NURSE PRACTITIONER

## 2019-01-10 PROCEDURE — 6370000000 HC RX 637 (ALT 250 FOR IP): Performed by: REGISTERED NURSE

## 2019-01-10 RX ORDER — QUETIAPINE FUMARATE 300 MG/1
600 TABLET, FILM COATED ORAL NIGHTLY
Status: DISCONTINUED | OUTPATIENT
Start: 2019-01-10 | End: 2019-01-14 | Stop reason: HOSPADM

## 2019-01-10 RX ADMIN — ONDANSETRON HYDROCHLORIDE 4 MG: 4 TABLET, FILM COATED ORAL at 09:21

## 2019-01-10 RX ADMIN — AMOXICILLIN 500 MG: 500 CAPSULE ORAL at 08:37

## 2019-01-10 RX ADMIN — QUETIAPINE FUMARATE 600 MG: 300 TABLET ORAL at 21:05

## 2019-01-10 RX ADMIN — DIPHENHYDRAMINE HYDROCHLORIDE 50 MG: 50 INJECTION, SOLUTION INTRAMUSCULAR; INTRAVENOUS at 18:00

## 2019-01-10 RX ADMIN — BUPRENORPHINE HYDROCHLORIDE, NALOXONE HYDROCHLORIDE 1 FILM: 8; 2 FILM, SOLUBLE BUCCAL; SUBLINGUAL at 21:04

## 2019-01-10 RX ADMIN — GABAPENTIN 100 MG: 100 CAPSULE ORAL at 08:37

## 2019-01-10 RX ADMIN — AMOXICILLIN 500 MG: 500 CAPSULE ORAL at 16:12

## 2019-01-10 RX ADMIN — DIPHENHYDRAMINE HYDROCHLORIDE 50 MG: 50 INJECTION, SOLUTION INTRAMUSCULAR; INTRAVENOUS at 09:20

## 2019-01-10 RX ADMIN — BUPRENORPHINE HYDROCHLORIDE, NALOXONE HYDROCHLORIDE 1 FILM: 8; 2 FILM, SOLUBLE BUCCAL; SUBLINGUAL at 08:37

## 2019-01-10 RX ADMIN — MAGNESIUM HYDROXIDE 30 ML: 400 SUSPENSION ORAL at 09:21

## 2019-01-10 RX ADMIN — HYDROXYZINE HYDROCHLORIDE 50 MG: 50 TABLET, FILM COATED ORAL at 16:49

## 2019-01-10 RX ADMIN — MOMETASONE FUROATE AND FORMOTEROL FUMARATE DIHYDRATE 2 PUFF: 200; 5 AEROSOL RESPIRATORY (INHALATION) at 21:05

## 2019-01-10 RX ADMIN — GABAPENTIN 100 MG: 100 CAPSULE ORAL at 16:12

## 2019-01-10 RX ADMIN — GABAPENTIN 100 MG: 100 CAPSULE ORAL at 21:05

## 2019-01-10 RX ADMIN — AMOXICILLIN 500 MG: 500 CAPSULE ORAL at 21:05

## 2019-01-10 ASSESSMENT — PAIN SCALES - GENERAL
PAINLEVEL_OUTOF10: 10
PAINLEVEL_OUTOF10: 0
PAINLEVEL_OUTOF10: 5

## 2019-01-10 ASSESSMENT — PAIN DESCRIPTION - PAIN TYPE
TYPE: ACUTE PAIN
TYPE: ACUTE PAIN

## 2019-01-10 ASSESSMENT — PAIN DESCRIPTION - LOCATION: LOCATION: OTHER (COMMENT)

## 2019-01-11 ENCOUNTER — TELEPHONE (OUTPATIENT)
Dept: GASTROENTEROLOGY | Age: 29
End: 2019-01-11

## 2019-01-11 PROCEDURE — 6360000002 HC RX W HCPCS: Performed by: NURSE PRACTITIONER

## 2019-01-11 PROCEDURE — 6370000000 HC RX 637 (ALT 250 FOR IP): Performed by: NURSE PRACTITIONER

## 2019-01-11 PROCEDURE — 99232 SBSQ HOSP IP/OBS MODERATE 35: CPT | Performed by: REGISTERED NURSE

## 2019-01-11 PROCEDURE — 6360000002 HC RX W HCPCS: Performed by: PSYCHIATRY & NEUROLOGY

## 2019-01-11 PROCEDURE — 6370000000 HC RX 637 (ALT 250 FOR IP): Performed by: REGISTERED NURSE

## 2019-01-11 PROCEDURE — 6370000000 HC RX 637 (ALT 250 FOR IP): Performed by: INTERNAL MEDICINE

## 2019-01-11 PROCEDURE — 6370000000 HC RX 637 (ALT 250 FOR IP): Performed by: PSYCHIATRY & NEUROLOGY

## 2019-01-11 PROCEDURE — 1240000000 HC EMOTIONAL WELLNESS R&B

## 2019-01-11 RX ADMIN — PANTOPRAZOLE SODIUM 40 MG: 40 TABLET, DELAYED RELEASE ORAL at 06:14

## 2019-01-11 RX ADMIN — BUPRENORPHINE HYDROCHLORIDE, NALOXONE HYDROCHLORIDE 1 FILM: 8; 2 FILM, SOLUBLE BUCCAL; SUBLINGUAL at 10:05

## 2019-01-11 RX ADMIN — ALUMINUM HYDROXIDE, MAGNESIUM HYDROXIDE, AND SIMETHICONE 30 ML: 200; 200; 20 SUSPENSION ORAL at 12:23

## 2019-01-11 RX ADMIN — DIPHENHYDRAMINE HYDROCHLORIDE 50 MG: 50 INJECTION, SOLUTION INTRAMUSCULAR; INTRAVENOUS at 19:39

## 2019-01-11 RX ADMIN — AMOXICILLIN 500 MG: 500 CAPSULE ORAL at 21:45

## 2019-01-11 RX ADMIN — QUETIAPINE FUMARATE 600 MG: 300 TABLET ORAL at 21:45

## 2019-01-11 RX ADMIN — GABAPENTIN 100 MG: 100 CAPSULE ORAL at 15:18

## 2019-01-11 RX ADMIN — BUPRENORPHINE HYDROCHLORIDE, NALOXONE HYDROCHLORIDE 1 FILM: 8; 2 FILM, SOLUBLE BUCCAL; SUBLINGUAL at 21:44

## 2019-01-11 RX ADMIN — LORAZEPAM 1 MG: 2 INJECTION, SOLUTION INTRAMUSCULAR; INTRAVENOUS at 22:07

## 2019-01-11 RX ADMIN — AMOXICILLIN 500 MG: 500 CAPSULE ORAL at 15:20

## 2019-01-11 RX ADMIN — GABAPENTIN 100 MG: 100 CAPSULE ORAL at 21:45

## 2019-01-11 RX ADMIN — MOMETASONE FUROATE AND FORMOTEROL FUMARATE DIHYDRATE 2 PUFF: 200; 5 AEROSOL RESPIRATORY (INHALATION) at 10:06

## 2019-01-11 RX ADMIN — DIPHENHYDRAMINE HYDROCHLORIDE 50 MG: 50 INJECTION, SOLUTION INTRAMUSCULAR; INTRAVENOUS at 01:58

## 2019-01-11 RX ADMIN — DIPHENHYDRAMINE HYDROCHLORIDE 50 MG: 50 INJECTION, SOLUTION INTRAMUSCULAR; INTRAVENOUS at 10:11

## 2019-01-11 RX ADMIN — ONDANSETRON HYDROCHLORIDE 4 MG: 4 TABLET, FILM COATED ORAL at 01:58

## 2019-01-11 RX ADMIN — AMOXICILLIN 500 MG: 500 CAPSULE ORAL at 06:14

## 2019-01-11 RX ADMIN — GABAPENTIN 100 MG: 100 CAPSULE ORAL at 10:05

## 2019-01-11 ASSESSMENT — PAIN SCALES - GENERAL
PAINLEVEL_OUTOF10: 2
PAINLEVEL_OUTOF10: 0

## 2019-01-12 PROCEDURE — 6370000000 HC RX 637 (ALT 250 FOR IP): Performed by: NURSE PRACTITIONER

## 2019-01-12 PROCEDURE — 1240000000 HC EMOTIONAL WELLNESS R&B

## 2019-01-12 PROCEDURE — 6360000002 HC RX W HCPCS: Performed by: PSYCHIATRY & NEUROLOGY

## 2019-01-12 PROCEDURE — 6360000002 HC RX W HCPCS: Performed by: NURSE PRACTITIONER

## 2019-01-12 PROCEDURE — 6370000000 HC RX 637 (ALT 250 FOR IP): Performed by: REGISTERED NURSE

## 2019-01-12 PROCEDURE — 6370000000 HC RX 637 (ALT 250 FOR IP): Performed by: PSYCHIATRY & NEUROLOGY

## 2019-01-12 PROCEDURE — 6370000000 HC RX 637 (ALT 250 FOR IP): Performed by: INTERNAL MEDICINE

## 2019-01-12 RX ADMIN — BUPRENORPHINE HYDROCHLORIDE, NALOXONE HYDROCHLORIDE 1 FILM: 8; 2 FILM, SOLUBLE BUCCAL; SUBLINGUAL at 08:59

## 2019-01-12 RX ADMIN — QUETIAPINE FUMARATE 600 MG: 300 TABLET ORAL at 21:07

## 2019-01-12 RX ADMIN — AMOXICILLIN 500 MG: 500 CAPSULE ORAL at 14:46

## 2019-01-12 RX ADMIN — HYDROXYZINE HYDROCHLORIDE 50 MG: 50 TABLET, FILM COATED ORAL at 09:02

## 2019-01-12 RX ADMIN — DIPHENHYDRAMINE HYDROCHLORIDE 50 MG: 50 INJECTION, SOLUTION INTRAMUSCULAR; INTRAVENOUS at 13:26

## 2019-01-12 RX ADMIN — PANTOPRAZOLE SODIUM 40 MG: 40 TABLET, DELAYED RELEASE ORAL at 06:04

## 2019-01-12 RX ADMIN — HYDROXYZINE HYDROCHLORIDE 50 MG: 50 TABLET, FILM COATED ORAL at 21:07

## 2019-01-12 RX ADMIN — BUPRENORPHINE HYDROCHLORIDE, NALOXONE HYDROCHLORIDE 1 FILM: 8; 2 FILM, SOLUBLE BUCCAL; SUBLINGUAL at 21:08

## 2019-01-12 RX ADMIN — GABAPENTIN 100 MG: 100 CAPSULE ORAL at 21:07

## 2019-01-12 RX ADMIN — DIPHENHYDRAMINE HYDROCHLORIDE 50 MG: 50 INJECTION, SOLUTION INTRAMUSCULAR; INTRAVENOUS at 04:19

## 2019-01-12 RX ADMIN — GABAPENTIN 100 MG: 100 CAPSULE ORAL at 14:46

## 2019-01-12 RX ADMIN — AMOXICILLIN 500 MG: 500 CAPSULE ORAL at 06:04

## 2019-01-12 RX ADMIN — TRAZODONE HYDROCHLORIDE 50 MG: 50 TABLET ORAL at 21:07

## 2019-01-12 RX ADMIN — MOMETASONE FUROATE AND FORMOTEROL FUMARATE DIHYDRATE 2 PUFF: 200; 5 AEROSOL RESPIRATORY (INHALATION) at 21:08

## 2019-01-12 RX ADMIN — MAGNESIUM HYDROXIDE 30 ML: 400 SUSPENSION ORAL at 15:36

## 2019-01-12 RX ADMIN — GABAPENTIN 100 MG: 100 CAPSULE ORAL at 08:33

## 2019-01-12 ASSESSMENT — PAIN SCALES - GENERAL
PAINLEVEL_OUTOF10: 0
PAINLEVEL_OUTOF10: 0

## 2019-01-13 PROBLEM — F25.0 SCHIZOAFFECTIVE DISORDER, BIPOLAR TYPE (HCC): Chronic | Status: ACTIVE | Noted: 2019-01-03

## 2019-01-13 PROBLEM — F33.9 RECURRENT MAJOR DEPRESSIVE DISORDER (HCC): Status: RESOLVED | Noted: 2017-02-14 | Resolved: 2019-01-13

## 2019-01-13 PROCEDURE — 87591 N.GONORRHOEAE DNA AMP PROB: CPT

## 2019-01-13 PROCEDURE — 6360000002 HC RX W HCPCS: Performed by: NURSE PRACTITIONER

## 2019-01-13 PROCEDURE — 6370000000 HC RX 637 (ALT 250 FOR IP): Performed by: INTERNAL MEDICINE

## 2019-01-13 PROCEDURE — 1240000000 HC EMOTIONAL WELLNESS R&B

## 2019-01-13 PROCEDURE — 6370000000 HC RX 637 (ALT 250 FOR IP): Performed by: PSYCHIATRY & NEUROLOGY

## 2019-01-13 PROCEDURE — 6370000000 HC RX 637 (ALT 250 FOR IP): Performed by: NURSE PRACTITIONER

## 2019-01-13 PROCEDURE — 6370000000 HC RX 637 (ALT 250 FOR IP): Performed by: REGISTERED NURSE

## 2019-01-13 PROCEDURE — 87491 CHLMYD TRACH DNA AMP PROBE: CPT

## 2019-01-13 PROCEDURE — 99232 SBSQ HOSP IP/OBS MODERATE 35: CPT | Performed by: PSYCHIATRY & NEUROLOGY

## 2019-01-13 PROCEDURE — 99221 1ST HOSP IP/OBS SF/LOW 40: CPT | Performed by: INTERNAL MEDICINE

## 2019-01-13 PROCEDURE — 6360000002 HC RX W HCPCS: Performed by: PSYCHIATRY & NEUROLOGY

## 2019-01-13 RX ORDER — AMOXICILLIN 250 MG/1
500 CAPSULE ORAL EVERY 8 HOURS SCHEDULED
Status: DISCONTINUED | OUTPATIENT
Start: 2019-01-13 | End: 2019-01-14 | Stop reason: HOSPADM

## 2019-01-13 RX ADMIN — DIPHENHYDRAMINE HYDROCHLORIDE 50 MG: 50 INJECTION, SOLUTION INTRAMUSCULAR; INTRAVENOUS at 08:47

## 2019-01-13 RX ADMIN — GABAPENTIN 100 MG: 100 CAPSULE ORAL at 08:48

## 2019-01-13 RX ADMIN — HYDROXYZINE HYDROCHLORIDE 50 MG: 50 TABLET, FILM COATED ORAL at 22:12

## 2019-01-13 RX ADMIN — AMOXICILLIN 500 MG: 500 CAPSULE ORAL at 08:48

## 2019-01-13 RX ADMIN — AMOXICILLIN 500 MG: 500 CAPSULE ORAL at 21:07

## 2019-01-13 RX ADMIN — MAGNESIUM HYDROXIDE 30 ML: 400 SUSPENSION ORAL at 23:40

## 2019-01-13 RX ADMIN — BUPRENORPHINE HYDROCHLORIDE, NALOXONE HYDROCHLORIDE 1 FILM: 8; 2 FILM, SOLUBLE BUCCAL; SUBLINGUAL at 21:12

## 2019-01-13 RX ADMIN — ONDANSETRON HYDROCHLORIDE 4 MG: 4 TABLET, FILM COATED ORAL at 23:40

## 2019-01-13 RX ADMIN — BUPRENORPHINE HYDROCHLORIDE, NALOXONE HYDROCHLORIDE 1 FILM: 8; 2 FILM, SOLUBLE BUCCAL; SUBLINGUAL at 10:40

## 2019-01-13 RX ADMIN — QUETIAPINE FUMARATE 600 MG: 300 TABLET ORAL at 21:07

## 2019-01-13 RX ADMIN — DIPHENHYDRAMINE HYDROCHLORIDE 50 MG: 50 INJECTION, SOLUTION INTRAMUSCULAR; INTRAVENOUS at 23:40

## 2019-01-13 RX ADMIN — MOMETASONE FUROATE AND FORMOTEROL FUMARATE DIHYDRATE 2 PUFF: 200; 5 AEROSOL RESPIRATORY (INHALATION) at 21:07

## 2019-01-13 RX ADMIN — GABAPENTIN 100 MG: 100 CAPSULE ORAL at 21:07

## 2019-01-13 ASSESSMENT — PAIN SCALES - GENERAL
PAINLEVEL_OUTOF10: 0
PAINLEVEL_OUTOF10: 0

## 2019-01-14 VITALS
TEMPERATURE: 97.7 F | HEART RATE: 95 BPM | HEIGHT: 69 IN | SYSTOLIC BLOOD PRESSURE: 137 MMHG | DIASTOLIC BLOOD PRESSURE: 84 MMHG | OXYGEN SATURATION: 97 % | WEIGHT: 223 LBS | BODY MASS INDEX: 33.03 KG/M2 | RESPIRATION RATE: 14 BRPM

## 2019-01-14 LAB
C. TRACHOMATIS DNA ,URINE: NEGATIVE
N. GONORRHOEAE DNA, URINE: NEGATIVE

## 2019-01-14 PROCEDURE — 6370000000 HC RX 637 (ALT 250 FOR IP): Performed by: INTERNAL MEDICINE

## 2019-01-14 PROCEDURE — 6360000002 HC RX W HCPCS: Performed by: NURSE PRACTITIONER

## 2019-01-14 PROCEDURE — 99239 HOSP IP/OBS DSCHRG MGMT >30: CPT | Performed by: PSYCHIATRY & NEUROLOGY

## 2019-01-14 PROCEDURE — 6370000000 HC RX 637 (ALT 250 FOR IP): Performed by: PSYCHIATRY & NEUROLOGY

## 2019-01-14 PROCEDURE — 6370000000 HC RX 637 (ALT 250 FOR IP): Performed by: NURSE PRACTITIONER

## 2019-01-14 RX ORDER — QUETIAPINE FUMARATE 300 MG/1
600 TABLET, FILM COATED ORAL NIGHTLY
Qty: 60 TABLET | Refills: 0 | Status: ON HOLD | OUTPATIENT
Start: 2019-01-14 | End: 2019-02-18 | Stop reason: HOSPADM

## 2019-01-14 RX ORDER — AMOXICILLIN 500 MG/1
500 CAPSULE ORAL EVERY 8 HOURS SCHEDULED
Qty: 30 CAPSULE | Refills: 0 | Status: SHIPPED | OUTPATIENT
Start: 2019-01-14 | End: 2019-01-24

## 2019-01-14 RX ORDER — TRAZODONE HYDROCHLORIDE 50 MG/1
50 TABLET ORAL NIGHTLY PRN
Qty: 7 TABLET | Refills: 1 | Status: ON HOLD | OUTPATIENT
Start: 2019-01-14 | End: 2019-02-18 | Stop reason: HOSPADM

## 2019-01-14 RX ORDER — GABAPENTIN 100 MG/1
100 CAPSULE ORAL 3 TIMES DAILY
Qty: 21 CAPSULE | Refills: 0 | Status: SHIPPED | OUTPATIENT
Start: 2019-01-14 | End: 2019-02-13

## 2019-01-14 RX ADMIN — BUPRENORPHINE HYDROCHLORIDE, NALOXONE HYDROCHLORIDE 1 FILM: 8; 2 FILM, SOLUBLE BUCCAL; SUBLINGUAL at 09:13

## 2019-01-14 ASSESSMENT — PAIN SCALES - GENERAL: PAINLEVEL_OUTOF10: 0

## 2019-02-11 ENCOUNTER — HOSPITAL ENCOUNTER (EMERGENCY)
Age: 29
Discharge: LEFT BEFORE DISCHARGE | End: 2019-02-11
Attending: EMERGENCY MEDICINE
Payer: MEDICARE

## 2019-02-11 ENCOUNTER — HOSPITAL ENCOUNTER (EMERGENCY)
Age: 29
Discharge: LEFT BEFORE DISCHARGE | End: 2019-02-11
Payer: MEDICARE

## 2019-02-11 VITALS
BODY MASS INDEX: 33.33 KG/M2 | HEART RATE: 107 BPM | WEIGHT: 225 LBS | HEIGHT: 69 IN | TEMPERATURE: 97.3 F | RESPIRATION RATE: 16 BRPM | SYSTOLIC BLOOD PRESSURE: 166 MMHG | DIASTOLIC BLOOD PRESSURE: 95 MMHG | OXYGEN SATURATION: 98 %

## 2019-02-11 VITALS
OXYGEN SATURATION: 97 % | HEIGHT: 69 IN | DIASTOLIC BLOOD PRESSURE: 94 MMHG | WEIGHT: 225 LBS | HEART RATE: 80 BPM | RESPIRATION RATE: 18 BRPM | TEMPERATURE: 97.3 F | SYSTOLIC BLOOD PRESSURE: 166 MMHG | BODY MASS INDEX: 33.33 KG/M2

## 2019-02-11 ASSESSMENT — PAIN DESCRIPTION - DESCRIPTORS
DESCRIPTORS: SHARP;ACHING
DESCRIPTORS: ACHING;THROBBING

## 2019-02-11 ASSESSMENT — PAIN DESCRIPTION - ONSET
ONSET: PROGRESSIVE
ONSET: ON-GOING

## 2019-02-11 ASSESSMENT — PAIN SCALES - GENERAL
PAINLEVEL_OUTOF10: 8
PAINLEVEL_OUTOF10: 10

## 2019-02-11 ASSESSMENT — PAIN DESCRIPTION - LOCATION
LOCATION: HEAD
LOCATION: HEAD

## 2019-02-11 ASSESSMENT — PAIN DESCRIPTION - PAIN TYPE
TYPE: ACUTE PAIN
TYPE: ACUTE PAIN

## 2019-02-11 ASSESSMENT — PAIN DESCRIPTION - FREQUENCY
FREQUENCY: CONTINUOUS
FREQUENCY: CONTINUOUS

## 2019-02-11 ASSESSMENT — PAIN DESCRIPTION - PROGRESSION
CLINICAL_PROGRESSION: GRADUALLY WORSENING
CLINICAL_PROGRESSION: GRADUALLY WORSENING

## 2019-02-13 ENCOUNTER — HOSPITAL ENCOUNTER (INPATIENT)
Age: 29
LOS: 6 days | Discharge: HOME | DRG: 885 | End: 2019-02-19
Attending: EMERGENCY MEDICINE | Admitting: PSYCHIATRY & NEUROLOGY
Payer: COMMERCIAL

## 2019-02-13 ENCOUNTER — APPOINTMENT (OUTPATIENT)
Dept: CT IMAGING | Age: 29
End: 2019-02-13
Payer: COMMERCIAL

## 2019-02-13 ENCOUNTER — HOSPITAL ENCOUNTER (EMERGENCY)
Age: 29
Discharge: HOME | End: 2019-02-13
Attending: EMERGENCY MEDICINE
Payer: COMMERCIAL

## 2019-02-13 ENCOUNTER — APPOINTMENT (OUTPATIENT)
Dept: GENERAL RADIOLOGY | Age: 29
End: 2019-02-13
Payer: COMMERCIAL

## 2019-02-13 VITALS
HEART RATE: 106 BPM | WEIGHT: 231.06 LBS | DIASTOLIC BLOOD PRESSURE: 59 MMHG | OXYGEN SATURATION: 94 % | BODY MASS INDEX: 34.22 KG/M2 | TEMPERATURE: 98.4 F | RESPIRATION RATE: 14 BRPM | SYSTOLIC BLOOD PRESSURE: 150 MMHG | HEIGHT: 69 IN

## 2019-02-13 DIAGNOSIS — F31.9 BIPOLAR 1 DISORDER (HCC): Primary | ICD-10-CM

## 2019-02-13 DIAGNOSIS — F22 BIZARRE DELUSION (HCC): ICD-10-CM

## 2019-02-13 DIAGNOSIS — R45.851 SUICIDAL THOUGHTS: ICD-10-CM

## 2019-02-13 DIAGNOSIS — S20.222A BACK CONTUSION, LEFT, INITIAL ENCOUNTER: Primary | ICD-10-CM

## 2019-02-13 DIAGNOSIS — S00.03XA CONTUSION OF SCALP, INITIAL ENCOUNTER: ICD-10-CM

## 2019-02-13 PROBLEM — F25.9 SCHIZOAFFECTIVE DISORDER (HCC): Status: ACTIVE | Noted: 2019-02-13

## 2019-02-13 PROCEDURE — 1240000000 HC EMOTIONAL WELLNESS R&B

## 2019-02-13 PROCEDURE — 70450 CT HEAD/BRAIN W/O DYE: CPT

## 2019-02-13 PROCEDURE — 99285 EMERGENCY DEPT VISIT HI MDM: CPT

## 2019-02-13 PROCEDURE — 71046 X-RAY EXAM CHEST 2 VIEWS: CPT

## 2019-02-13 PROCEDURE — 99284 EMERGENCY DEPT VISIT MOD MDM: CPT

## 2019-02-13 PROCEDURE — 6370000000 HC RX 637 (ALT 250 FOR IP): Performed by: EMERGENCY MEDICINE

## 2019-02-13 PROCEDURE — 6370000000 HC RX 637 (ALT 250 FOR IP): Performed by: NURSE PRACTITIONER

## 2019-02-13 RX ORDER — ACETAMINOPHEN 325 MG/1
650 TABLET ORAL EVERY 4 HOURS PRN
Status: DISCONTINUED | OUTPATIENT
Start: 2019-02-13 | End: 2019-02-19 | Stop reason: HOSPADM

## 2019-02-13 RX ORDER — UBIDECARENONE 75 MG
100 CAPSULE ORAL DAILY
Status: ON HOLD | COMMUNITY
End: 2019-02-21

## 2019-02-13 RX ORDER — DIPHENHYDRAMINE HCL 50 MG
50 CAPSULE ORAL 2 TIMES DAILY
Status: ON HOLD | COMMUNITY
End: 2019-02-18 | Stop reason: HOSPADM

## 2019-02-13 RX ORDER — DIPHENHYDRAMINE HCL 25 MG
25 TABLET ORAL ONCE
Status: COMPLETED | OUTPATIENT
Start: 2019-02-13 | End: 2019-02-13

## 2019-02-13 RX ORDER — MAGNESIUM HYDROXIDE/ALUMINUM HYDROXICE/SIMETHICONE 120; 1200; 1200 MG/30ML; MG/30ML; MG/30ML
30 SUSPENSION ORAL EVERY 6 HOURS PRN
Status: DISCONTINUED | OUTPATIENT
Start: 2019-02-13 | End: 2019-02-19 | Stop reason: HOSPADM

## 2019-02-13 RX ORDER — DIPHENHYDRAMINE HCL 25 MG
50 TABLET ORAL ONCE
Status: COMPLETED | OUTPATIENT
Start: 2019-02-13 | End: 2019-02-13

## 2019-02-13 RX ORDER — DULOXETIN HYDROCHLORIDE 60 MG/1
60 CAPSULE, DELAYED RELEASE ORAL DAILY
Status: DISCONTINUED | OUTPATIENT
Start: 2019-02-14 | End: 2019-02-19 | Stop reason: HOSPADM

## 2019-02-13 RX ORDER — ERGOCALCIFEROL 1.25 MG/1
50000 CAPSULE ORAL WEEKLY
Status: DISCONTINUED | OUTPATIENT
Start: 2019-02-14 | End: 2019-02-19 | Stop reason: HOSPADM

## 2019-02-13 RX ORDER — TRAZODONE HYDROCHLORIDE 50 MG/1
50 TABLET ORAL NIGHTLY PRN
Status: DISCONTINUED | OUTPATIENT
Start: 2019-02-14 | End: 2019-02-19 | Stop reason: HOSPADM

## 2019-02-13 RX ORDER — FLUTICASONE FUROATE AND VILANTEROL 200; 25 UG/1; UG/1
1 POWDER RESPIRATORY (INHALATION) DAILY
Status: DISCONTINUED | OUTPATIENT
Start: 2019-02-14 | End: 2019-02-14 | Stop reason: CLARIF

## 2019-02-13 RX ORDER — ALBUTEROL SULFATE 90 UG/1
2 AEROSOL, METERED RESPIRATORY (INHALATION) EVERY 6 HOURS PRN
Status: DISCONTINUED | OUTPATIENT
Start: 2019-02-13 | End: 2019-02-19 | Stop reason: HOSPADM

## 2019-02-13 RX ORDER — NICOTINE 21 MG/24HR
1 PATCH, TRANSDERMAL 24 HOURS TRANSDERMAL DAILY
Status: DISCONTINUED | OUTPATIENT
Start: 2019-02-14 | End: 2019-02-14

## 2019-02-13 RX ORDER — BENZTROPINE MESYLATE 1 MG/ML
2 INJECTION INTRAMUSCULAR; INTRAVENOUS 2 TIMES DAILY PRN
Status: DISCONTINUED | OUTPATIENT
Start: 2019-02-13 | End: 2019-02-19 | Stop reason: HOSPADM

## 2019-02-13 RX ORDER — GABAPENTIN 300 MG/1
300 CAPSULE ORAL 3 TIMES DAILY
Status: DISCONTINUED | OUTPATIENT
Start: 2019-02-13 | End: 2019-02-19 | Stop reason: HOSPADM

## 2019-02-13 RX ORDER — QUETIAPINE FUMARATE 100 MG/1
100 TABLET, FILM COATED ORAL NIGHTLY
Status: DISCONTINUED | OUTPATIENT
Start: 2019-02-13 | End: 2019-02-19 | Stop reason: HOSPADM

## 2019-02-13 RX ORDER — PANTOPRAZOLE SODIUM 40 MG/1
40 TABLET, DELAYED RELEASE ORAL
Status: DISCONTINUED | OUTPATIENT
Start: 2019-02-14 | End: 2019-02-19 | Stop reason: HOSPADM

## 2019-02-13 RX ORDER — HYDROXYZINE HYDROCHLORIDE 25 MG/1
50 TABLET, FILM COATED ORAL 3 TIMES DAILY PRN
Status: DISCONTINUED | OUTPATIENT
Start: 2019-02-13 | End: 2019-02-19 | Stop reason: HOSPADM

## 2019-02-13 RX ADMIN — DIPHENHYDRAMINE HCL 50 MG: 25 TABLET ORAL at 20:25

## 2019-02-13 RX ADMIN — DIPHENHYDRAMINE HCL 25 MG: 25 TABLET ORAL at 16:06

## 2019-02-13 RX ADMIN — GABAPENTIN 300 MG: 300 CAPSULE ORAL at 23:10

## 2019-02-13 RX ADMIN — QUETIAPINE FUMARATE 100 MG: 100 TABLET ORAL at 23:10

## 2019-02-13 ASSESSMENT — SLEEP AND FATIGUE QUESTIONNAIRES
SLEEP PATTERN: DIFFICULTY FALLING ASLEEP;DISTURBED/INTERRUPTED SLEEP;INSOMNIA;RESTLESSNESS
DIFFICULTY ARISING: NO
DO YOU USE A SLEEP AID: YES
DO YOU HAVE DIFFICULTY SLEEPING: YES
RESTFUL SLEEP: NO
DIFFICULTY FALLING ASLEEP: YES
DIFFICULTY STAYING ASLEEP: YES
AVERAGE NUMBER OF SLEEP HOURS: 4

## 2019-02-13 ASSESSMENT — PAIN SCALES - GENERAL
PAINLEVEL_OUTOF10: 8
PAINLEVEL_OUTOF10: 0

## 2019-02-13 ASSESSMENT — ENCOUNTER SYMPTOMS
DIARRHEA: 0
BACK PAIN: 0
COLOR CHANGE: 0
VOMITING: 0
EYE REDNESS: 0
CONSTIPATION: 0
ABDOMINAL PAIN: 0
EYE DISCHARGE: 0
COUGH: 0
FACIAL SWELLING: 0
VOMITING: 0
SHORTNESS OF BREATH: 0
NAUSEA: 0
ABDOMINAL PAIN: 0
SHORTNESS OF BREATH: 0

## 2019-02-13 ASSESSMENT — LIFESTYLE VARIABLES: HISTORY_ALCOHOL_USE: NO

## 2019-02-13 ASSESSMENT — PAIN DESCRIPTION - PAIN TYPE: TYPE: ACUTE PAIN

## 2019-02-13 ASSESSMENT — PAIN DESCRIPTION - DESCRIPTORS: DESCRIPTORS: ACHING

## 2019-02-13 ASSESSMENT — PAIN DESCRIPTION - LOCATION: LOCATION: BACK

## 2019-02-14 PROCEDURE — 6370000000 HC RX 637 (ALT 250 FOR IP): Performed by: NURSE PRACTITIONER

## 2019-02-14 PROCEDURE — 6370000000 HC RX 637 (ALT 250 FOR IP)

## 2019-02-14 PROCEDURE — 90792 PSYCH DIAG EVAL W/MED SRVCS: CPT | Performed by: NURSE PRACTITIONER

## 2019-02-14 PROCEDURE — 1240000000 HC EMOTIONAL WELLNESS R&B

## 2019-02-14 RX ORDER — IBUPROFEN 800 MG/1
800 TABLET ORAL 3 TIMES DAILY PRN
Status: DISCONTINUED | OUTPATIENT
Start: 2019-02-14 | End: 2019-02-19 | Stop reason: HOSPADM

## 2019-02-14 RX ORDER — DIPHENHYDRAMINE HCL 25 MG
50 TABLET ORAL 2 TIMES DAILY PRN
Status: DISCONTINUED | OUTPATIENT
Start: 2019-02-14 | End: 2019-02-17

## 2019-02-14 RX ORDER — CYCLOBENZAPRINE HCL 10 MG
10 TABLET ORAL 3 TIMES DAILY PRN
Status: DISCONTINUED | OUTPATIENT
Start: 2019-02-14 | End: 2019-02-19 | Stop reason: HOSPADM

## 2019-02-14 RX ORDER — LOPERAMIDE HYDROCHLORIDE 2 MG/1
2 CAPSULE ORAL 4 TIMES DAILY PRN
Status: DISCONTINUED | OUTPATIENT
Start: 2019-02-14 | End: 2019-02-19 | Stop reason: HOSPADM

## 2019-02-14 RX ORDER — PROMETHAZINE HYDROCHLORIDE 25 MG/1
25 TABLET ORAL EVERY 6 HOURS PRN
Status: DISCONTINUED | OUTPATIENT
Start: 2019-02-14 | End: 2019-02-19 | Stop reason: HOSPADM

## 2019-02-14 RX ADMIN — PANTOPRAZOLE SODIUM 40 MG: 40 TABLET, DELAYED RELEASE ORAL at 08:47

## 2019-02-14 RX ADMIN — NICOTINE POLACRILEX 2 MG: 2 GUM, CHEWING BUCCAL at 15:48

## 2019-02-14 RX ADMIN — NICOTINE POLACRILEX 2 MG: 2 GUM, CHEWING BUCCAL at 10:52

## 2019-02-14 RX ADMIN — QUETIAPINE FUMARATE 100 MG: 100 TABLET ORAL at 21:04

## 2019-02-14 RX ADMIN — ERGOCALCIFEROL 50000 UNITS: 1.25 CAPSULE ORAL at 08:47

## 2019-02-14 RX ADMIN — MOMETASONE FUROATE AND FORMOTEROL FUMARATE DIHYDRATE 2 PUFF: 200; 5 AEROSOL RESPIRATORY (INHALATION) at 21:03

## 2019-02-14 RX ADMIN — GABAPENTIN 300 MG: 300 CAPSULE ORAL at 21:04

## 2019-02-14 RX ADMIN — NICOTINE POLACRILEX 2 MG: 2 GUM, CHEWING BUCCAL at 08:47

## 2019-02-14 RX ADMIN — NICOTINE POLACRILEX 2 MG: 2 GUM, CHEWING BUCCAL at 17:59

## 2019-02-14 RX ADMIN — CYCLOBENZAPRINE HYDROCHLORIDE 10 MG: 10 TABLET, FILM COATED ORAL at 15:48

## 2019-02-14 RX ADMIN — DULOXETINE HYDROCHLORIDE 60 MG: 60 CAPSULE, DELAYED RELEASE ORAL at 08:47

## 2019-02-14 RX ADMIN — GABAPENTIN 300 MG: 300 CAPSULE ORAL at 16:00

## 2019-02-14 RX ADMIN — GABAPENTIN 300 MG: 300 CAPSULE ORAL at 08:47

## 2019-02-14 RX ADMIN — NICOTINE POLACRILEX 2 MG: 2 GUM, CHEWING BUCCAL at 21:34

## 2019-02-14 RX ADMIN — CYCLOBENZAPRINE HYDROCHLORIDE 10 MG: 10 TABLET, FILM COATED ORAL at 21:04

## 2019-02-14 ASSESSMENT — LIFESTYLE VARIABLES: HISTORY_ALCOHOL_USE: NO

## 2019-02-14 ASSESSMENT — ENCOUNTER SYMPTOMS
NAUSEA: 0
BACK PAIN: 0
EYE REDNESS: 0
COLOR CHANGE: 0
WHEEZING: 0
ABDOMINAL PAIN: 0
CHEST TIGHTNESS: 0
BLOOD IN STOOL: 0
SORE THROAT: 0
TROUBLE SWALLOWING: 0
EYE PAIN: 0
EYE DISCHARGE: 0
VOMITING: 0
SINUS PAIN: 0
COUGH: 0
DIARRHEA: 0
CONSTIPATION: 1
SHORTNESS OF BREATH: 0

## 2019-02-15 PROCEDURE — 1240000000 HC EMOTIONAL WELLNESS R&B

## 2019-02-15 PROCEDURE — 6360000002 HC RX W HCPCS: Performed by: NURSE PRACTITIONER

## 2019-02-15 PROCEDURE — 90833 PSYTX W PT W E/M 30 MIN: CPT | Performed by: NURSE PRACTITIONER

## 2019-02-15 PROCEDURE — 6370000000 HC RX 637 (ALT 250 FOR IP): Performed by: NURSE PRACTITIONER

## 2019-02-15 PROCEDURE — 99232 SBSQ HOSP IP/OBS MODERATE 35: CPT | Performed by: NURSE PRACTITIONER

## 2019-02-15 PROCEDURE — 6370000000 HC RX 637 (ALT 250 FOR IP)

## 2019-02-15 RX ORDER — NICOTINE 21 MG/24HR
1 PATCH, TRANSDERMAL 24 HOURS TRANSDERMAL DAILY
Status: DISCONTINUED | OUTPATIENT
Start: 2019-02-15 | End: 2019-02-19 | Stop reason: HOSPADM

## 2019-02-15 RX ORDER — AMOXICILLIN 500 MG/1
500 CAPSULE ORAL EVERY 12 HOURS SCHEDULED
Status: DISCONTINUED | OUTPATIENT
Start: 2019-02-15 | End: 2019-02-19 | Stop reason: HOSPADM

## 2019-02-15 RX ORDER — DIPHENHYDRAMINE HYDROCHLORIDE 50 MG/ML
25 INJECTION INTRAMUSCULAR; INTRAVENOUS 2 TIMES DAILY PRN
Status: DISCONTINUED | OUTPATIENT
Start: 2019-02-15 | End: 2019-02-17

## 2019-02-15 RX ORDER — DIAPER,BRIEF,INFANT-TODD,DISP
EACH MISCELLANEOUS 2 TIMES DAILY
Status: DISCONTINUED | OUTPATIENT
Start: 2019-02-15 | End: 2019-02-19 | Stop reason: HOSPADM

## 2019-02-15 RX ORDER — DIPHENHYDRAMINE HYDROCHLORIDE 50 MG/ML
25 INJECTION INTRAMUSCULAR; INTRAVENOUS 2 TIMES DAILY PRN
Status: DISCONTINUED | OUTPATIENT
Start: 2019-02-15 | End: 2019-02-15

## 2019-02-15 RX ADMIN — TRAZODONE HYDROCHLORIDE 50 MG: 50 TABLET ORAL at 21:04

## 2019-02-15 RX ADMIN — CYCLOBENZAPRINE HYDROCHLORIDE 10 MG: 10 TABLET, FILM COATED ORAL at 09:02

## 2019-02-15 RX ADMIN — PROMETHAZINE HYDROCHLORIDE 25 MG: 25 TABLET ORAL at 09:14

## 2019-02-15 RX ADMIN — MOMETASONE FUROATE AND FORMOTEROL FUMARATE DIHYDRATE 2 PUFF: 200; 5 AEROSOL RESPIRATORY (INHALATION) at 21:04

## 2019-02-15 RX ADMIN — PANTOPRAZOLE SODIUM 40 MG: 40 TABLET, DELAYED RELEASE ORAL at 09:20

## 2019-02-15 RX ADMIN — CYCLOBENZAPRINE HYDROCHLORIDE 10 MG: 10 TABLET, FILM COATED ORAL at 21:04

## 2019-02-15 RX ADMIN — HYDROXYZINE HYDROCHLORIDE 50 MG: 25 TABLET, FILM COATED ORAL at 15:47

## 2019-02-15 RX ADMIN — DIPHENHYDRAMINE HCL 50 MG: 25 TABLET ORAL at 09:48

## 2019-02-15 RX ADMIN — NICOTINE POLACRILEX 2 MG: 2 GUM, CHEWING BUCCAL at 09:20

## 2019-02-15 RX ADMIN — NICOTINE POLACRILEX 2 MG: 2 GUM, CHEWING BUCCAL at 11:57

## 2019-02-15 RX ADMIN — GABAPENTIN 300 MG: 300 CAPSULE ORAL at 09:02

## 2019-02-15 RX ADMIN — HYDROCORTISONE: 1 CREAM TOPICAL at 21:05

## 2019-02-15 RX ADMIN — QUETIAPINE FUMARATE 100 MG: 100 TABLET ORAL at 21:04

## 2019-02-15 RX ADMIN — GABAPENTIN 300 MG: 300 CAPSULE ORAL at 21:04

## 2019-02-15 RX ADMIN — GABAPENTIN 300 MG: 300 CAPSULE ORAL at 14:15

## 2019-02-15 RX ADMIN — CYCLOBENZAPRINE HYDROCHLORIDE 10 MG: 10 TABLET, FILM COATED ORAL at 15:47

## 2019-02-15 RX ADMIN — DULOXETINE HYDROCHLORIDE 60 MG: 60 CAPSULE, DELAYED RELEASE ORAL at 09:02

## 2019-02-15 RX ADMIN — AMOXICILLIN 500 MG: 500 CAPSULE ORAL at 21:04

## 2019-02-15 RX ADMIN — DIPHENHYDRAMINE HYDROCHLORIDE 25 MG: 50 INJECTION, SOLUTION INTRAMUSCULAR; INTRAVENOUS at 21:03

## 2019-02-16 PROCEDURE — 6370000000 HC RX 637 (ALT 250 FOR IP): Performed by: NURSE PRACTITIONER

## 2019-02-16 PROCEDURE — 1240000000 HC EMOTIONAL WELLNESS R&B

## 2019-02-16 PROCEDURE — 6360000002 HC RX W HCPCS: Performed by: NURSE PRACTITIONER

## 2019-02-16 PROCEDURE — 99232 SBSQ HOSP IP/OBS MODERATE 35: CPT | Performed by: NURSE PRACTITIONER

## 2019-02-16 PROCEDURE — 90833 PSYTX W PT W E/M 30 MIN: CPT | Performed by: NURSE PRACTITIONER

## 2019-02-16 RX ORDER — ARIPIPRAZOLE 15 MG/1
15 TABLET ORAL DAILY
Status: DISCONTINUED | OUTPATIENT
Start: 2019-02-16 | End: 2019-02-19 | Stop reason: HOSPADM

## 2019-02-16 RX ADMIN — DIPHENHYDRAMINE HCL 50 MG: 25 TABLET ORAL at 19:58

## 2019-02-16 RX ADMIN — HYDROXYZINE HYDROCHLORIDE 50 MG: 25 TABLET, FILM COATED ORAL at 11:06

## 2019-02-16 RX ADMIN — PROMETHAZINE HYDROCHLORIDE 25 MG: 25 TABLET ORAL at 17:12

## 2019-02-16 RX ADMIN — CYCLOBENZAPRINE HYDROCHLORIDE 10 MG: 10 TABLET, FILM COATED ORAL at 17:12

## 2019-02-16 RX ADMIN — HYDROCORTISONE: 1 CREAM TOPICAL at 08:25

## 2019-02-16 RX ADMIN — GABAPENTIN 300 MG: 300 CAPSULE ORAL at 14:47

## 2019-02-16 RX ADMIN — HYDROCORTISONE: 1 CREAM TOPICAL at 20:54

## 2019-02-16 RX ADMIN — GABAPENTIN 300 MG: 300 CAPSULE ORAL at 08:23

## 2019-02-16 RX ADMIN — DIPHENHYDRAMINE HYDROCHLORIDE 25 MG: 50 INJECTION, SOLUTION INTRAMUSCULAR; INTRAVENOUS at 17:12

## 2019-02-16 RX ADMIN — QUETIAPINE FUMARATE 100 MG: 100 TABLET ORAL at 20:54

## 2019-02-16 RX ADMIN — CYCLOBENZAPRINE HYDROCHLORIDE 10 MG: 10 TABLET, FILM COATED ORAL at 08:31

## 2019-02-16 RX ADMIN — GABAPENTIN 300 MG: 300 CAPSULE ORAL at 19:57

## 2019-02-16 RX ADMIN — ARIPIPRAZOLE 15 MG: 15 TABLET ORAL at 09:58

## 2019-02-16 RX ADMIN — AMOXICILLIN 500 MG: 500 CAPSULE ORAL at 08:23

## 2019-02-16 RX ADMIN — DIPHENHYDRAMINE HYDROCHLORIDE 25 MG: 50 INJECTION, SOLUTION INTRAMUSCULAR; INTRAVENOUS at 08:31

## 2019-02-16 RX ADMIN — DULOXETINE HYDROCHLORIDE 60 MG: 60 CAPSULE, DELAYED RELEASE ORAL at 08:23

## 2019-02-16 RX ADMIN — AMOXICILLIN 500 MG: 500 CAPSULE ORAL at 19:58

## 2019-02-16 ASSESSMENT — PAIN DESCRIPTION - LOCATION: LOCATION: ABDOMEN

## 2019-02-16 ASSESSMENT — PAIN SCALES - GENERAL: PAINLEVEL_OUTOF10: 10

## 2019-02-17 PROCEDURE — 6370000000 HC RX 637 (ALT 250 FOR IP): Performed by: NURSE PRACTITIONER

## 2019-02-17 PROCEDURE — 99232 SBSQ HOSP IP/OBS MODERATE 35: CPT | Performed by: NURSE PRACTITIONER

## 2019-02-17 PROCEDURE — 6360000002 HC RX W HCPCS: Performed by: NURSE PRACTITIONER

## 2019-02-17 PROCEDURE — 1240000000 HC EMOTIONAL WELLNESS R&B

## 2019-02-17 RX ORDER — DIPHENHYDRAMINE HYDROCHLORIDE 50 MG/ML
25 INJECTION INTRAMUSCULAR; INTRAVENOUS EVERY 8 HOURS PRN
Status: DISCONTINUED | OUTPATIENT
Start: 2019-02-17 | End: 2019-02-19 | Stop reason: HOSPADM

## 2019-02-17 RX ORDER — DIPHENHYDRAMINE HCL 25 MG
50 TABLET ORAL EVERY 8 HOURS PRN
Status: DISCONTINUED | OUTPATIENT
Start: 2019-02-17 | End: 2019-02-19 | Stop reason: HOSPADM

## 2019-02-17 RX ADMIN — PANTOPRAZOLE SODIUM 40 MG: 40 TABLET, DELAYED RELEASE ORAL at 08:14

## 2019-02-17 RX ADMIN — TRAZODONE HYDROCHLORIDE 50 MG: 50 TABLET ORAL at 21:37

## 2019-02-17 RX ADMIN — GABAPENTIN 300 MG: 300 CAPSULE ORAL at 08:15

## 2019-02-17 RX ADMIN — DULOXETINE HYDROCHLORIDE 60 MG: 60 CAPSULE, DELAYED RELEASE ORAL at 08:14

## 2019-02-17 RX ADMIN — AMOXICILLIN 500 MG: 500 CAPSULE ORAL at 21:39

## 2019-02-17 RX ADMIN — DIPHENHYDRAMINE HYDROCHLORIDE 25 MG: 50 INJECTION, SOLUTION INTRAMUSCULAR; INTRAVENOUS at 17:01

## 2019-02-17 RX ADMIN — PROMETHAZINE HYDROCHLORIDE 25 MG: 25 TABLET ORAL at 21:37

## 2019-02-17 RX ADMIN — CYCLOBENZAPRINE HYDROCHLORIDE 10 MG: 10 TABLET, FILM COATED ORAL at 11:51

## 2019-02-17 RX ADMIN — CYCLOBENZAPRINE HYDROCHLORIDE 10 MG: 10 TABLET, FILM COATED ORAL at 03:25

## 2019-02-17 RX ADMIN — HYDROCORTISONE: 1 CREAM TOPICAL at 14:30

## 2019-02-17 RX ADMIN — AMOXICILLIN 500 MG: 500 CAPSULE ORAL at 08:15

## 2019-02-17 RX ADMIN — QUETIAPINE FUMARATE 100 MG: 100 TABLET ORAL at 21:37

## 2019-02-17 RX ADMIN — GABAPENTIN 300 MG: 300 CAPSULE ORAL at 21:37

## 2019-02-17 RX ADMIN — CYCLOBENZAPRINE HYDROCHLORIDE 10 MG: 10 TABLET, FILM COATED ORAL at 21:37

## 2019-02-17 RX ADMIN — ARIPIPRAZOLE 15 MG: 15 TABLET ORAL at 08:14

## 2019-02-17 RX ADMIN — DIPHENHYDRAMINE HYDROCHLORIDE 25 MG: 50 INJECTION, SOLUTION INTRAMUSCULAR; INTRAVENOUS at 08:17

## 2019-02-17 RX ADMIN — HYDROXYZINE HYDROCHLORIDE 50 MG: 25 TABLET, FILM COATED ORAL at 03:24

## 2019-02-17 RX ADMIN — IBUPROFEN 800 MG: 800 TABLET ORAL at 08:14

## 2019-02-17 RX ADMIN — GABAPENTIN 300 MG: 300 CAPSULE ORAL at 14:28

## 2019-02-17 ASSESSMENT — PAIN SCALES - GENERAL
PAINLEVEL_OUTOF10: 1
PAINLEVEL_OUTOF10: 3
PAINLEVEL_OUTOF10: 0

## 2019-02-18 VITALS
OXYGEN SATURATION: 100 % | TEMPERATURE: 98 F | BODY MASS INDEX: 33.03 KG/M2 | HEART RATE: 88 BPM | DIASTOLIC BLOOD PRESSURE: 77 MMHG | WEIGHT: 223 LBS | SYSTOLIC BLOOD PRESSURE: 149 MMHG | RESPIRATION RATE: 14 BRPM | HEIGHT: 69 IN

## 2019-02-18 PROCEDURE — 90833 PSYTX W PT W E/M 30 MIN: CPT | Performed by: NURSE PRACTITIONER

## 2019-02-18 PROCEDURE — 6370000000 HC RX 637 (ALT 250 FOR IP): Performed by: NURSE PRACTITIONER

## 2019-02-18 PROCEDURE — 6360000002 HC RX W HCPCS: Performed by: NURSE PRACTITIONER

## 2019-02-18 PROCEDURE — 1240000000 HC EMOTIONAL WELLNESS R&B

## 2019-02-18 PROCEDURE — 99232 SBSQ HOSP IP/OBS MODERATE 35: CPT | Performed by: NURSE PRACTITIONER

## 2019-02-18 RX ORDER — GABAPENTIN 300 MG/1
300 CAPSULE ORAL 3 TIMES DAILY
Qty: 42 CAPSULE | Refills: 0 | Status: ON HOLD | OUTPATIENT
Start: 2019-02-18 | End: 2019-02-27 | Stop reason: HOSPADM

## 2019-02-18 RX ORDER — PANTOPRAZOLE SODIUM 40 MG/1
40 TABLET, DELAYED RELEASE ORAL
Qty: 14 TABLET | Refills: 0 | Status: SHIPPED | OUTPATIENT
Start: 2019-02-19 | End: 2019-10-11 | Stop reason: SDUPTHER

## 2019-02-18 RX ORDER — QUETIAPINE FUMARATE 100 MG/1
100 TABLET, FILM COATED ORAL NIGHTLY
Qty: 14 TABLET | Refills: 0 | Status: ON HOLD | OUTPATIENT
Start: 2019-02-18 | End: 2019-02-27 | Stop reason: HOSPADM

## 2019-02-18 RX ORDER — DULOXETIN HYDROCHLORIDE 60 MG/1
60 CAPSULE, DELAYED RELEASE ORAL DAILY
Qty: 14 CAPSULE | Refills: 0 | Status: ON HOLD | OUTPATIENT
Start: 2019-02-19 | End: 2019-02-27 | Stop reason: HOSPADM

## 2019-02-18 RX ORDER — ARIPIPRAZOLE 15 MG/1
15 TABLET ORAL DAILY
Qty: 30 TABLET | Refills: 3 | Status: ON HOLD | OUTPATIENT
Start: 2019-02-19 | End: 2019-02-27 | Stop reason: HOSPADM

## 2019-02-18 RX ORDER — AMOXICILLIN 500 MG/1
500 CAPSULE ORAL EVERY 12 HOURS SCHEDULED
Qty: 12 CAPSULE | Refills: 0 | Status: ON HOLD | OUTPATIENT
Start: 2019-02-18 | End: 2019-02-27 | Stop reason: HOSPADM

## 2019-02-18 RX ORDER — TRAZODONE HYDROCHLORIDE 50 MG/1
50 TABLET ORAL NIGHTLY PRN
Qty: 14 TABLET | Refills: 0 | Status: ON HOLD | OUTPATIENT
Start: 2019-02-18 | End: 2019-02-27 | Stop reason: HOSPADM

## 2019-02-18 RX ADMIN — PROMETHAZINE HYDROCHLORIDE 25 MG: 25 TABLET ORAL at 20:08

## 2019-02-18 RX ADMIN — GABAPENTIN 300 MG: 300 CAPSULE ORAL at 13:49

## 2019-02-18 RX ADMIN — PROMETHAZINE HYDROCHLORIDE 25 MG: 25 TABLET ORAL at 13:49

## 2019-02-18 RX ADMIN — CYCLOBENZAPRINE HYDROCHLORIDE 10 MG: 10 TABLET, FILM COATED ORAL at 09:04

## 2019-02-18 RX ADMIN — AMOXICILLIN 500 MG: 500 CAPSULE ORAL at 09:04

## 2019-02-18 RX ADMIN — GABAPENTIN 300 MG: 300 CAPSULE ORAL at 09:04

## 2019-02-18 RX ADMIN — DULOXETINE HYDROCHLORIDE 60 MG: 60 CAPSULE, DELAYED RELEASE ORAL at 09:04

## 2019-02-18 RX ADMIN — QUETIAPINE FUMARATE 100 MG: 100 TABLET ORAL at 20:08

## 2019-02-18 RX ADMIN — AMOXICILLIN 500 MG: 500 CAPSULE ORAL at 20:08

## 2019-02-18 RX ADMIN — DIPHENHYDRAMINE HYDROCHLORIDE 25 MG: 50 INJECTION, SOLUTION INTRAMUSCULAR; INTRAVENOUS at 17:18

## 2019-02-18 RX ADMIN — GABAPENTIN 300 MG: 300 CAPSULE ORAL at 20:08

## 2019-02-18 RX ADMIN — CYCLOBENZAPRINE HYDROCHLORIDE 10 MG: 10 TABLET, FILM COATED ORAL at 20:08

## 2019-02-18 RX ADMIN — HYDROCORTISONE: 1 CREAM TOPICAL at 20:08

## 2019-02-18 RX ADMIN — ARIPIPRAZOLE 15 MG: 15 TABLET ORAL at 09:04

## 2019-02-18 RX ADMIN — DIPHENHYDRAMINE HYDROCHLORIDE 25 MG: 50 INJECTION, SOLUTION INTRAMUSCULAR; INTRAVENOUS at 09:48

## 2019-02-18 RX ADMIN — HYDROCORTISONE: 1 CREAM TOPICAL at 09:05

## 2019-02-19 PROCEDURE — 5130000000 HC BRIDGE APPOINTMENT

## 2019-02-19 PROCEDURE — 6370000000 HC RX 637 (ALT 250 FOR IP): Performed by: NURSE PRACTITIONER

## 2019-02-19 PROCEDURE — 99239 HOSP IP/OBS DSCHRG MGMT >30: CPT | Performed by: NURSE PRACTITIONER

## 2019-02-19 PROCEDURE — 6360000002 HC RX W HCPCS: Performed by: NURSE PRACTITIONER

## 2019-02-19 RX ADMIN — DIPHENHYDRAMINE HYDROCHLORIDE 25 MG: 50 INJECTION, SOLUTION INTRAMUSCULAR; INTRAVENOUS at 02:08

## 2019-02-19 RX ADMIN — CYCLOBENZAPRINE HYDROCHLORIDE 10 MG: 10 TABLET, FILM COATED ORAL at 08:28

## 2019-02-19 RX ADMIN — DIPHENHYDRAMINE HCL 50 MG: 25 TABLET ORAL at 08:28

## 2019-02-19 RX ADMIN — PROMETHAZINE HYDROCHLORIDE 25 MG: 25 TABLET ORAL at 08:28

## 2019-02-19 RX ADMIN — ARIPIPRAZOLE 15 MG: 15 TABLET ORAL at 08:28

## 2019-02-19 RX ADMIN — GABAPENTIN 300 MG: 300 CAPSULE ORAL at 08:28

## 2019-02-19 RX ADMIN — AMOXICILLIN 500 MG: 500 CAPSULE ORAL at 08:27

## 2019-02-19 RX ADMIN — DULOXETINE HYDROCHLORIDE 60 MG: 60 CAPSULE, DELAYED RELEASE ORAL at 08:28

## 2019-02-21 ENCOUNTER — HOSPITAL ENCOUNTER (INPATIENT)
Age: 29
LOS: 6 days | Discharge: HOME | DRG: 885 | End: 2019-02-27
Attending: PSYCHIATRY & NEUROLOGY | Admitting: PSYCHIATRY & NEUROLOGY
Payer: COMMERCIAL

## 2019-02-21 PROCEDURE — 1240000000 HC EMOTIONAL WELLNESS R&B

## 2019-02-21 PROCEDURE — 99222 1ST HOSP IP/OBS MODERATE 55: CPT | Performed by: REGISTERED NURSE

## 2019-02-21 PROCEDURE — 6370000000 HC RX 637 (ALT 250 FOR IP): Performed by: REGISTERED NURSE

## 2019-02-21 PROCEDURE — 90833 PSYTX W PT W E/M 30 MIN: CPT | Performed by: REGISTERED NURSE

## 2019-02-21 RX ORDER — BENZTROPINE MESYLATE 1 MG/ML
2 INJECTION INTRAMUSCULAR; INTRAVENOUS 2 TIMES DAILY PRN
Status: DISCONTINUED | OUTPATIENT
Start: 2019-02-21 | End: 2019-02-27 | Stop reason: HOSPADM

## 2019-02-21 RX ORDER — UBIDECARENONE 75 MG
100 CAPSULE ORAL DAILY
Status: DISCONTINUED | OUTPATIENT
Start: 2019-02-21 | End: 2019-02-27 | Stop reason: HOSPADM

## 2019-02-21 RX ORDER — NICOTINE 21 MG/24HR
1 PATCH, TRANSDERMAL 24 HOURS TRANSDERMAL DAILY
Status: DISCONTINUED | OUTPATIENT
Start: 2019-02-21 | End: 2019-02-23

## 2019-02-21 RX ORDER — FLUTICASONE FUROATE AND VILANTEROL 200; 25 UG/1; UG/1
1 POWDER RESPIRATORY (INHALATION) DAILY
Status: DISCONTINUED | OUTPATIENT
Start: 2019-02-21 | End: 2019-02-21 | Stop reason: CLARIF

## 2019-02-21 RX ORDER — TRAZODONE HYDROCHLORIDE 50 MG/1
50 TABLET ORAL NIGHTLY PRN
Status: DISCONTINUED | OUTPATIENT
Start: 2019-02-21 | End: 2019-02-27 | Stop reason: HOSPADM

## 2019-02-21 RX ORDER — DULOXETIN HYDROCHLORIDE 60 MG/1
60 CAPSULE, DELAYED RELEASE ORAL DAILY
Status: DISCONTINUED | OUTPATIENT
Start: 2019-02-21 | End: 2019-02-27 | Stop reason: HOSPADM

## 2019-02-21 RX ORDER — ERGOCALCIFEROL 1.25 MG/1
50000 CAPSULE ORAL WEEKLY
Status: DISCONTINUED | OUTPATIENT
Start: 2019-02-21 | End: 2019-02-27 | Stop reason: HOSPADM

## 2019-02-21 RX ORDER — DIPHENHYDRAMINE HCL 25 MG
50 TABLET ORAL EVERY 8 HOURS PRN
Status: DISCONTINUED | OUTPATIENT
Start: 2019-02-21 | End: 2019-02-27 | Stop reason: HOSPADM

## 2019-02-21 RX ORDER — QUETIAPINE FUMARATE 100 MG/1
100 TABLET, FILM COATED ORAL NIGHTLY
Status: DISCONTINUED | OUTPATIENT
Start: 2019-02-21 | End: 2019-02-22

## 2019-02-21 RX ORDER — ACETAMINOPHEN 325 MG/1
650 TABLET ORAL EVERY 4 HOURS PRN
Status: DISCONTINUED | OUTPATIENT
Start: 2019-02-21 | End: 2019-02-27 | Stop reason: HOSPADM

## 2019-02-21 RX ORDER — AMOXICILLIN 500 MG/1
500 CAPSULE ORAL EVERY 12 HOURS SCHEDULED
Status: DISCONTINUED | OUTPATIENT
Start: 2019-02-21 | End: 2019-02-27 | Stop reason: HOSPADM

## 2019-02-21 RX ORDER — MAGNESIUM HYDROXIDE/ALUMINUM HYDROXICE/SIMETHICONE 120; 1200; 1200 MG/30ML; MG/30ML; MG/30ML
30 SUSPENSION ORAL EVERY 6 HOURS PRN
Status: DISCONTINUED | OUTPATIENT
Start: 2019-02-21 | End: 2019-02-27 | Stop reason: HOSPADM

## 2019-02-21 RX ORDER — DIAPER,BRIEF,INFANT-TODD,DISP
EACH MISCELLANEOUS 2 TIMES DAILY
Status: ON HOLD | COMMUNITY
End: 2019-02-27 | Stop reason: HOSPADM

## 2019-02-21 RX ORDER — GABAPENTIN 300 MG/1
300 CAPSULE ORAL 3 TIMES DAILY
Status: DISCONTINUED | OUTPATIENT
Start: 2019-02-21 | End: 2019-02-27 | Stop reason: HOSPADM

## 2019-02-21 RX ORDER — ARIPIPRAZOLE 15 MG/1
15 TABLET ORAL DAILY
Status: DISCONTINUED | OUTPATIENT
Start: 2019-02-21 | End: 2019-02-22

## 2019-02-21 RX ORDER — ALBUTEROL SULFATE 90 UG/1
2 AEROSOL, METERED RESPIRATORY (INHALATION) EVERY 6 HOURS PRN
Status: DISCONTINUED | OUTPATIENT
Start: 2019-02-21 | End: 2019-02-27 | Stop reason: HOSPADM

## 2019-02-21 RX ORDER — PANTOPRAZOLE SODIUM 40 MG/1
40 TABLET, DELAYED RELEASE ORAL
Status: DISCONTINUED | OUTPATIENT
Start: 2019-02-22 | End: 2019-02-27 | Stop reason: HOSPADM

## 2019-02-21 RX ADMIN — AMOXICILLIN 500 MG: 500 CAPSULE ORAL at 20:34

## 2019-02-21 RX ADMIN — GABAPENTIN 300 MG: 300 CAPSULE ORAL at 16:51

## 2019-02-21 RX ADMIN — ERGOCALCIFEROL 50000 UNITS: 1.25 CAPSULE ORAL at 11:29

## 2019-02-21 RX ADMIN — ARIPIPRAZOLE 15 MG: 15 TABLET ORAL at 10:18

## 2019-02-21 RX ADMIN — QUETIAPINE FUMARATE 100 MG: 100 TABLET ORAL at 20:34

## 2019-02-21 RX ADMIN — GABAPENTIN 300 MG: 300 CAPSULE ORAL at 10:18

## 2019-02-21 RX ADMIN — VITAM B12 100 MCG: 100 TAB at 11:29

## 2019-02-21 RX ADMIN — GABAPENTIN 300 MG: 300 CAPSULE ORAL at 20:34

## 2019-02-21 RX ADMIN — DIPHENHYDRAMINE HCL 50 MG: 25 TABLET ORAL at 11:01

## 2019-02-21 RX ADMIN — DULOXETINE HYDROCHLORIDE 60 MG: 60 CAPSULE, DELAYED RELEASE ORAL at 10:18

## 2019-02-21 RX ADMIN — AMOXICILLIN 500 MG: 500 CAPSULE ORAL at 11:29

## 2019-02-21 RX ADMIN — DIPHENHYDRAMINE HCL 50 MG: 25 TABLET ORAL at 20:34

## 2019-02-21 ASSESSMENT — SLEEP AND FATIGUE QUESTIONNAIRES
DO YOU HAVE DIFFICULTY SLEEPING: NO
DO YOU USE A SLEEP AID: NO
DO YOU USE A SLEEP AID: NO
DO YOU HAVE DIFFICULTY SLEEPING: NO
AVERAGE NUMBER OF SLEEP HOURS: 6
AVERAGE NUMBER OF SLEEP HOURS: 6

## 2019-02-21 ASSESSMENT — LIFESTYLE VARIABLES
HISTORY_ALCOHOL_USE: NO
HISTORY_ALCOHOL_USE: NO

## 2019-02-21 ASSESSMENT — PAIN SCALES - GENERAL: PAINLEVEL_OUTOF10: 0

## 2019-02-21 ASSESSMENT — PATIENT HEALTH QUESTIONNAIRE - PHQ9: SUM OF ALL RESPONSES TO PHQ QUESTIONS 1-9: 9

## 2019-02-22 PROCEDURE — 6360000002 HC RX W HCPCS: Performed by: PSYCHIATRY & NEUROLOGY

## 2019-02-22 PROCEDURE — 99232 SBSQ HOSP IP/OBS MODERATE 35: CPT | Performed by: PSYCHIATRY & NEUROLOGY

## 2019-02-22 PROCEDURE — 6370000000 HC RX 637 (ALT 250 FOR IP): Performed by: REGISTERED NURSE

## 2019-02-22 PROCEDURE — 1240000000 HC EMOTIONAL WELLNESS R&B

## 2019-02-22 RX ORDER — ARIPIPRAZOLE 20 MG/1
20 TABLET ORAL DAILY
Status: DISCONTINUED | OUTPATIENT
Start: 2019-02-23 | End: 2019-02-27 | Stop reason: HOSPADM

## 2019-02-22 RX ORDER — DIPHENHYDRAMINE HYDROCHLORIDE 50 MG/ML
25 INJECTION INTRAMUSCULAR; INTRAVENOUS EVERY 6 HOURS PRN
Status: DISCONTINUED | OUTPATIENT
Start: 2019-02-22 | End: 2019-02-27 | Stop reason: HOSPADM

## 2019-02-22 RX ADMIN — GABAPENTIN 300 MG: 300 CAPSULE ORAL at 20:49

## 2019-02-22 RX ADMIN — GABAPENTIN 300 MG: 300 CAPSULE ORAL at 08:49

## 2019-02-22 RX ADMIN — MOMETASONE FUROATE AND FORMOTEROL FUMARATE DIHYDRATE 2 PUFF: 200; 5 AEROSOL RESPIRATORY (INHALATION) at 08:48

## 2019-02-22 RX ADMIN — VITAM B12 100 MCG: 100 TAB at 08:49

## 2019-02-22 RX ADMIN — DULOXETINE HYDROCHLORIDE 60 MG: 60 CAPSULE, DELAYED RELEASE ORAL at 08:49

## 2019-02-22 RX ADMIN — PANTOPRAZOLE SODIUM 40 MG: 40 TABLET, DELAYED RELEASE ORAL at 08:49

## 2019-02-22 RX ADMIN — DIPHENHYDRAMINE HYDROCHLORIDE 25 MG: 50 INJECTION, SOLUTION INTRAMUSCULAR; INTRAVENOUS at 14:47

## 2019-02-22 RX ADMIN — GABAPENTIN 300 MG: 300 CAPSULE ORAL at 14:46

## 2019-02-22 RX ADMIN — DIPHENHYDRAMINE HCL 50 MG: 25 TABLET ORAL at 09:21

## 2019-02-22 RX ADMIN — ACETAMINOPHEN 650 MG: 325 TABLET, FILM COATED ORAL at 09:28

## 2019-02-22 RX ADMIN — ARIPIPRAZOLE 15 MG: 15 TABLET ORAL at 08:49

## 2019-02-22 RX ADMIN — DIPHENHYDRAMINE HYDROCHLORIDE 25 MG: 50 INJECTION, SOLUTION INTRAMUSCULAR; INTRAVENOUS at 20:49

## 2019-02-22 RX ADMIN — AMOXICILLIN 500 MG: 500 CAPSULE ORAL at 21:01

## 2019-02-22 RX ADMIN — MOMETASONE FUROATE AND FORMOTEROL FUMARATE DIHYDRATE 2 PUFF: 200; 5 AEROSOL RESPIRATORY (INHALATION) at 20:49

## 2019-02-22 RX ADMIN — AMOXICILLIN 500 MG: 500 CAPSULE ORAL at 08:49

## 2019-02-22 ASSESSMENT — PAIN DESCRIPTION - PAIN TYPE: TYPE: ACUTE PAIN

## 2019-02-22 ASSESSMENT — PAIN SCALES - GENERAL
PAINLEVEL_OUTOF10: 0
PAINLEVEL_OUTOF10: 3

## 2019-02-22 ASSESSMENT — PAIN DESCRIPTION - LOCATION: LOCATION: TEETH

## 2019-02-23 LAB
ABSOLUTE EOS #: 0 K/UL (ref 0–0.4)
ABSOLUTE IMMATURE GRANULOCYTE: ABNORMAL K/UL (ref 0–0.3)
ABSOLUTE LYMPH #: 2 K/UL (ref 1–4.8)
ABSOLUTE MONO #: 0.7 K/UL (ref 0.1–1.3)
ALBUMIN SERPL-MCNC: 4.4 G/DL (ref 3.5–5.2)
ALBUMIN/GLOBULIN RATIO: ABNORMAL (ref 1–2.5)
ALP BLD-CCNC: 64 U/L (ref 40–129)
ALT SERPL-CCNC: 13 U/L (ref 5–41)
ANION GAP SERPL CALCULATED.3IONS-SCNC: 13 MMOL/L (ref 9–17)
AST SERPL-CCNC: 27 U/L
BASOPHILS # BLD: 1 % (ref 0–2)
BASOPHILS ABSOLUTE: 0.1 K/UL (ref 0–0.2)
BILIRUB SERPL-MCNC: 0.25 MG/DL (ref 0.3–1.2)
BUN BLDV-MCNC: 7 MG/DL (ref 6–20)
BUN/CREAT BLD: ABNORMAL (ref 9–20)
CALCIUM SERPL-MCNC: 9.6 MG/DL (ref 8.6–10.4)
CHLORIDE BLD-SCNC: 104 MMOL/L (ref 98–107)
CO2: 24 MMOL/L (ref 20–31)
CREAT SERPL-MCNC: 0.81 MG/DL (ref 0.7–1.2)
DIFFERENTIAL TYPE: ABNORMAL
EOSINOPHILS RELATIVE PERCENT: 1 % (ref 0–4)
GFR AFRICAN AMERICAN: >60 ML/MIN
GFR NON-AFRICAN AMERICAN: >60 ML/MIN
GFR SERPL CREATININE-BSD FRML MDRD: ABNORMAL ML/MIN/{1.73_M2}
GFR SERPL CREATININE-BSD FRML MDRD: ABNORMAL ML/MIN/{1.73_M2}
GLUCOSE BLD-MCNC: 97 MG/DL (ref 70–99)
HCT VFR BLD CALC: 44.8 % (ref 41–53)
HEMOGLOBIN: 14.6 G/DL (ref 13.5–17.5)
IMMATURE GRANULOCYTES: ABNORMAL %
LYMPHOCYTES # BLD: 24 % (ref 24–44)
MCH RBC QN AUTO: 29.1 PG (ref 26–34)
MCHC RBC AUTO-ENTMCNC: 32.7 G/DL (ref 31–37)
MCV RBC AUTO: 89 FL (ref 80–100)
MONOCYTES # BLD: 8 % (ref 1–7)
NRBC AUTOMATED: ABNORMAL PER 100 WBC
PDW BLD-RTO: 14.2 % (ref 11.5–14.9)
PLATELET # BLD: 268 K/UL (ref 150–450)
PLATELET ESTIMATE: ABNORMAL
PMV BLD AUTO: 8.6 FL (ref 6–12)
POTASSIUM SERPL-SCNC: 4 MMOL/L (ref 3.7–5.3)
RBC # BLD: 5.03 M/UL (ref 4.5–5.9)
RBC # BLD: ABNORMAL 10*6/UL
SEG NEUTROPHILS: 66 % (ref 36–66)
SEGMENTED NEUTROPHILS ABSOLUTE COUNT: 5.8 K/UL (ref 1.3–9.1)
SODIUM BLD-SCNC: 141 MMOL/L (ref 135–144)
TOTAL PROTEIN: 8.2 G/DL (ref 6.4–8.3)
WBC # BLD: 8.7 K/UL (ref 3.5–11)
WBC # BLD: ABNORMAL 10*3/UL

## 2019-02-23 PROCEDURE — 85025 COMPLETE CBC W/AUTO DIFF WBC: CPT

## 2019-02-23 PROCEDURE — 99232 SBSQ HOSP IP/OBS MODERATE 35: CPT | Performed by: PSYCHIATRY & NEUROLOGY

## 2019-02-23 PROCEDURE — 6360000002 HC RX W HCPCS: Performed by: PSYCHIATRY & NEUROLOGY

## 2019-02-23 PROCEDURE — 6370000000 HC RX 637 (ALT 250 FOR IP): Performed by: PSYCHIATRY & NEUROLOGY

## 2019-02-23 PROCEDURE — 36415 COLL VENOUS BLD VENIPUNCTURE: CPT

## 2019-02-23 PROCEDURE — 80053 COMPREHEN METABOLIC PANEL: CPT

## 2019-02-23 PROCEDURE — 1240000000 HC EMOTIONAL WELLNESS R&B

## 2019-02-23 PROCEDURE — 6370000000 HC RX 637 (ALT 250 FOR IP): Performed by: REGISTERED NURSE

## 2019-02-23 RX ORDER — NICOTINE 21 MG/24HR
1 PATCH, TRANSDERMAL 24 HOURS TRANSDERMAL DAILY
Status: DISCONTINUED | OUTPATIENT
Start: 2019-02-23 | End: 2019-02-27 | Stop reason: HOSPADM

## 2019-02-23 RX ADMIN — ARIPIPRAZOLE 20 MG: 20 TABLET ORAL at 08:22

## 2019-02-23 RX ADMIN — DIPHENHYDRAMINE HCL 50 MG: 25 TABLET ORAL at 08:21

## 2019-02-23 RX ADMIN — DIPHENHYDRAMINE HYDROCHLORIDE 25 MG: 50 INJECTION, SOLUTION INTRAMUSCULAR; INTRAVENOUS at 10:53

## 2019-02-23 RX ADMIN — ACETAMINOPHEN 650 MG: 325 TABLET, FILM COATED ORAL at 13:29

## 2019-02-23 RX ADMIN — DIPHENHYDRAMINE HYDROCHLORIDE 25 MG: 50 INJECTION, SOLUTION INTRAMUSCULAR; INTRAVENOUS at 21:41

## 2019-02-23 RX ADMIN — BENZOCAINE: 100 GEL TOPICAL at 15:28

## 2019-02-23 RX ADMIN — GABAPENTIN 300 MG: 300 CAPSULE ORAL at 08:22

## 2019-02-23 RX ADMIN — GABAPENTIN 300 MG: 300 CAPSULE ORAL at 14:44

## 2019-02-23 RX ADMIN — ACETAMINOPHEN 650 MG: 325 TABLET, FILM COATED ORAL at 07:45

## 2019-02-23 RX ADMIN — VITAM B12 100 MCG: 100 TAB at 08:27

## 2019-02-23 RX ADMIN — GABAPENTIN 300 MG: 300 CAPSULE ORAL at 21:33

## 2019-02-23 RX ADMIN — PANTOPRAZOLE SODIUM 40 MG: 40 TABLET, DELAYED RELEASE ORAL at 08:22

## 2019-02-23 RX ADMIN — MOMETASONE FUROATE AND FORMOTEROL FUMARATE DIHYDRATE 2 PUFF: 200; 5 AEROSOL RESPIRATORY (INHALATION) at 21:33

## 2019-02-23 RX ADMIN — AMOXICILLIN 500 MG: 500 CAPSULE ORAL at 21:34

## 2019-02-23 RX ADMIN — DULOXETINE HYDROCHLORIDE 60 MG: 60 CAPSULE, DELAYED RELEASE ORAL at 08:22

## 2019-02-23 RX ADMIN — AMOXICILLIN 500 MG: 500 CAPSULE ORAL at 08:27

## 2019-02-23 RX ADMIN — DIPHENHYDRAMINE HYDROCHLORIDE 25 MG: 50 INJECTION, SOLUTION INTRAMUSCULAR; INTRAVENOUS at 16:57

## 2019-02-23 RX ADMIN — TRAZODONE HYDROCHLORIDE 50 MG: 50 TABLET ORAL at 22:48

## 2019-02-23 RX ADMIN — MOMETASONE FUROATE AND FORMOTEROL FUMARATE DIHYDRATE 2 PUFF: 200; 5 AEROSOL RESPIRATORY (INHALATION) at 08:57

## 2019-02-23 RX ADMIN — DIPHENHYDRAMINE HYDROCHLORIDE 25 MG: 50 INJECTION, SOLUTION INTRAMUSCULAR; INTRAVENOUS at 04:39

## 2019-02-23 ASSESSMENT — PAIN SCALES - GENERAL
PAINLEVEL_OUTOF10: 10
PAINLEVEL_OUTOF10: 9
PAINLEVEL_OUTOF10: 7
PAINLEVEL_OUTOF10: 7

## 2019-02-24 PROBLEM — F25.9 SCHIZOAFFECTIVE DISORDER (HCC): Status: RESOLVED | Noted: 2019-02-13 | Resolved: 2019-02-24

## 2019-02-24 PROBLEM — F31.9 BIPOLAR 1 DISORDER (HCC): Chronic | Status: RESOLVED | Noted: 2018-04-10 | Resolved: 2019-02-24

## 2019-02-24 PROCEDURE — 6370000000 HC RX 637 (ALT 250 FOR IP): Performed by: PSYCHIATRY & NEUROLOGY

## 2019-02-24 PROCEDURE — 6370000000 HC RX 637 (ALT 250 FOR IP): Performed by: REGISTERED NURSE

## 2019-02-24 PROCEDURE — 6370000000 HC RX 637 (ALT 250 FOR IP): Performed by: NURSE PRACTITIONER

## 2019-02-24 PROCEDURE — 6360000002 HC RX W HCPCS: Performed by: PSYCHIATRY & NEUROLOGY

## 2019-02-24 PROCEDURE — 1240000000 HC EMOTIONAL WELLNESS R&B

## 2019-02-24 PROCEDURE — 99232 SBSQ HOSP IP/OBS MODERATE 35: CPT | Performed by: PSYCHIATRY & NEUROLOGY

## 2019-02-24 RX ADMIN — AMOXICILLIN 500 MG: 500 CAPSULE ORAL at 08:36

## 2019-02-24 RX ADMIN — MOMETASONE FUROATE AND FORMOTEROL FUMARATE DIHYDRATE 2 PUFF: 200; 5 AEROSOL RESPIRATORY (INHALATION) at 20:23

## 2019-02-24 RX ADMIN — DULOXETINE HYDROCHLORIDE 60 MG: 60 CAPSULE, DELAYED RELEASE ORAL at 08:36

## 2019-02-24 RX ADMIN — PANTOPRAZOLE SODIUM 40 MG: 40 TABLET, DELAYED RELEASE ORAL at 08:36

## 2019-02-24 RX ADMIN — DIPHENHYDRAMINE HCL 50 MG: 25 TABLET ORAL at 20:22

## 2019-02-24 RX ADMIN — BENZOCAINE: 100 GEL TOPICAL at 08:38

## 2019-02-24 RX ADMIN — VITAM B12 100 MCG: 100 TAB at 08:36

## 2019-02-24 RX ADMIN — GABAPENTIN 300 MG: 300 CAPSULE ORAL at 20:22

## 2019-02-24 RX ADMIN — AMOXICILLIN 500 MG: 500 CAPSULE ORAL at 20:22

## 2019-02-24 RX ADMIN — DIPHENHYDRAMINE HYDROCHLORIDE 25 MG: 50 INJECTION, SOLUTION INTRAMUSCULAR; INTRAVENOUS at 04:49

## 2019-02-24 RX ADMIN — GABAPENTIN 300 MG: 300 CAPSULE ORAL at 08:36

## 2019-02-24 RX ADMIN — GABAPENTIN 300 MG: 300 CAPSULE ORAL at 13:57

## 2019-02-24 RX ADMIN — MOMETASONE FUROATE AND FORMOTEROL FUMARATE DIHYDRATE 2 PUFF: 200; 5 AEROSOL RESPIRATORY (INHALATION) at 08:37

## 2019-02-24 RX ADMIN — DIPHENHYDRAMINE HYDROCHLORIDE 25 MG: 50 INJECTION, SOLUTION INTRAMUSCULAR; INTRAVENOUS at 17:08

## 2019-02-24 RX ADMIN — ARIPIPRAZOLE 20 MG: 20 TABLET ORAL at 08:36

## 2019-02-24 RX ADMIN — BENZOCAINE: 100 GEL TOPICAL at 14:39

## 2019-02-24 RX ADMIN — DIPHENHYDRAMINE HCL 50 MG: 25 TABLET ORAL at 08:48

## 2019-02-24 RX ADMIN — DIPHENHYDRAMINE HYDROCHLORIDE 25 MG: 50 INJECTION, SOLUTION INTRAMUSCULAR; INTRAVENOUS at 11:47

## 2019-02-24 RX ADMIN — Medication 1 MG: at 21:29

## 2019-02-25 PROCEDURE — 99232 SBSQ HOSP IP/OBS MODERATE 35: CPT | Performed by: REGISTERED NURSE

## 2019-02-25 PROCEDURE — 6370000000 HC RX 637 (ALT 250 FOR IP): Performed by: PSYCHIATRY & NEUROLOGY

## 2019-02-25 PROCEDURE — 1240000000 HC EMOTIONAL WELLNESS R&B

## 2019-02-25 PROCEDURE — 6370000000 HC RX 637 (ALT 250 FOR IP): Performed by: REGISTERED NURSE

## 2019-02-25 PROCEDURE — 6370000000 HC RX 637 (ALT 250 FOR IP): Performed by: NURSE PRACTITIONER

## 2019-02-25 PROCEDURE — 6360000002 HC RX W HCPCS: Performed by: PSYCHIATRY & NEUROLOGY

## 2019-02-25 RX ADMIN — ACETAMINOPHEN 650 MG: 325 TABLET, FILM COATED ORAL at 05:17

## 2019-02-25 RX ADMIN — AMOXICILLIN 500 MG: 500 CAPSULE ORAL at 08:33

## 2019-02-25 RX ADMIN — DULOXETINE HYDROCHLORIDE 60 MG: 60 CAPSULE, DELAYED RELEASE ORAL at 08:33

## 2019-02-25 RX ADMIN — DIPHENHYDRAMINE HYDROCHLORIDE 25 MG: 50 INJECTION, SOLUTION INTRAMUSCULAR; INTRAVENOUS at 16:19

## 2019-02-25 RX ADMIN — DIPHENHYDRAMINE HYDROCHLORIDE 25 MG: 50 INJECTION, SOLUTION INTRAMUSCULAR; INTRAVENOUS at 03:51

## 2019-02-25 RX ADMIN — DIPHENHYDRAMINE HYDROCHLORIDE 25 MG: 50 INJECTION, SOLUTION INTRAMUSCULAR; INTRAVENOUS at 10:17

## 2019-02-25 RX ADMIN — BENZOCAINE: 100 GEL TOPICAL at 09:08

## 2019-02-25 RX ADMIN — ARIPIPRAZOLE 20 MG: 20 TABLET ORAL at 08:33

## 2019-02-25 RX ADMIN — MOMETASONE FUROATE AND FORMOTEROL FUMARATE DIHYDRATE 2 PUFF: 200; 5 AEROSOL RESPIRATORY (INHALATION) at 22:22

## 2019-02-25 RX ADMIN — GABAPENTIN 300 MG: 300 CAPSULE ORAL at 22:23

## 2019-02-25 RX ADMIN — DIPHENHYDRAMINE HYDROCHLORIDE 25 MG: 50 INJECTION, SOLUTION INTRAMUSCULAR; INTRAVENOUS at 22:23

## 2019-02-25 RX ADMIN — GABAPENTIN 300 MG: 300 CAPSULE ORAL at 14:40

## 2019-02-25 RX ADMIN — TRAZODONE HYDROCHLORIDE 50 MG: 50 TABLET ORAL at 23:38

## 2019-02-25 RX ADMIN — Medication 1 MG: at 22:23

## 2019-02-25 RX ADMIN — GABAPENTIN 300 MG: 300 CAPSULE ORAL at 08:33

## 2019-02-25 RX ADMIN — AMOXICILLIN 500 MG: 500 CAPSULE ORAL at 22:22

## 2019-02-25 RX ADMIN — VITAM B12 100 MCG: 100 TAB at 08:33

## 2019-02-25 ASSESSMENT — PAIN DESCRIPTION - PAIN TYPE: TYPE: ACUTE PAIN

## 2019-02-25 ASSESSMENT — PAIN SCALES - GENERAL
PAINLEVEL_OUTOF10: 0
PAINLEVEL_OUTOF10: 3

## 2019-02-25 ASSESSMENT — PAIN DESCRIPTION - LOCATION: LOCATION: TEETH

## 2019-02-26 VITALS
WEIGHT: 218 LBS | BODY MASS INDEX: 32.29 KG/M2 | DIASTOLIC BLOOD PRESSURE: 81 MMHG | HEIGHT: 69 IN | SYSTOLIC BLOOD PRESSURE: 128 MMHG | HEART RATE: 94 BPM | TEMPERATURE: 98.1 F | RESPIRATION RATE: 16 BRPM

## 2019-02-26 PROCEDURE — 6370000000 HC RX 637 (ALT 250 FOR IP): Performed by: NURSE PRACTITIONER

## 2019-02-26 PROCEDURE — 1240000000 HC EMOTIONAL WELLNESS R&B

## 2019-02-26 PROCEDURE — 90833 PSYTX W PT W E/M 30 MIN: CPT | Performed by: NURSE PRACTITIONER

## 2019-02-26 PROCEDURE — 6370000000 HC RX 637 (ALT 250 FOR IP): Performed by: PSYCHIATRY & NEUROLOGY

## 2019-02-26 PROCEDURE — 6360000002 HC RX W HCPCS: Performed by: PSYCHIATRY & NEUROLOGY

## 2019-02-26 PROCEDURE — 6370000000 HC RX 637 (ALT 250 FOR IP): Performed by: REGISTERED NURSE

## 2019-02-26 PROCEDURE — 99232 SBSQ HOSP IP/OBS MODERATE 35: CPT | Performed by: NURSE PRACTITIONER

## 2019-02-26 RX ADMIN — AMOXICILLIN 500 MG: 500 CAPSULE ORAL at 20:28

## 2019-02-26 RX ADMIN — ACETAMINOPHEN 650 MG: 325 TABLET, FILM COATED ORAL at 05:53

## 2019-02-26 RX ADMIN — DULOXETINE HYDROCHLORIDE 60 MG: 60 CAPSULE, DELAYED RELEASE ORAL at 08:23

## 2019-02-26 RX ADMIN — TRAZODONE HYDROCHLORIDE 50 MG: 50 TABLET ORAL at 20:28

## 2019-02-26 RX ADMIN — AMOXICILLIN 500 MG: 500 CAPSULE ORAL at 08:23

## 2019-02-26 RX ADMIN — GABAPENTIN 300 MG: 300 CAPSULE ORAL at 08:23

## 2019-02-26 RX ADMIN — BENZOCAINE: 100 GEL TOPICAL at 10:52

## 2019-02-26 RX ADMIN — GABAPENTIN 300 MG: 300 CAPSULE ORAL at 20:28

## 2019-02-26 RX ADMIN — Medication 1 MG: at 20:28

## 2019-02-26 RX ADMIN — VITAM B12 100 MCG: 100 TAB at 08:23

## 2019-02-26 RX ADMIN — DIPHENHYDRAMINE HYDROCHLORIDE 25 MG: 50 INJECTION, SOLUTION INTRAMUSCULAR; INTRAVENOUS at 04:51

## 2019-02-26 RX ADMIN — DIPHENHYDRAMINE HCL 50 MG: 25 TABLET ORAL at 20:28

## 2019-02-26 RX ADMIN — DIPHENHYDRAMINE HYDROCHLORIDE 25 MG: 50 INJECTION, SOLUTION INTRAMUSCULAR; INTRAVENOUS at 10:53

## 2019-02-26 RX ADMIN — ARIPIPRAZOLE 20 MG: 20 TABLET ORAL at 08:23

## 2019-02-26 RX ADMIN — GABAPENTIN 300 MG: 300 CAPSULE ORAL at 14:14

## 2019-02-26 ASSESSMENT — PAIN SCALES - GENERAL
PAINLEVEL_OUTOF10: 0
PAINLEVEL_OUTOF10: 5
PAINLEVEL_OUTOF10: 5

## 2019-02-26 ASSESSMENT — PAIN DESCRIPTION - LOCATION: LOCATION: HEAD

## 2019-02-27 PROCEDURE — 99238 HOSP IP/OBS DSCHRG MGMT 30/<: CPT | Performed by: NURSE PRACTITIONER

## 2019-02-27 PROCEDURE — 6370000000 HC RX 637 (ALT 250 FOR IP): Performed by: PSYCHIATRY & NEUROLOGY

## 2019-02-27 PROCEDURE — 6370000000 HC RX 637 (ALT 250 FOR IP): Performed by: REGISTERED NURSE

## 2019-02-27 PROCEDURE — 6360000002 HC RX W HCPCS: Performed by: PSYCHIATRY & NEUROLOGY

## 2019-02-27 PROCEDURE — 5130000000 HC BRIDGE APPOINTMENT: Performed by: COUNSELOR

## 2019-02-27 RX ORDER — ARIPIPRAZOLE 20 MG/1
20 TABLET ORAL DAILY
Qty: 14 TABLET | Refills: 0 | Status: SHIPPED | OUTPATIENT
Start: 2019-02-28 | End: 2021-01-26 | Stop reason: ALTCHOICE

## 2019-02-27 RX ORDER — GABAPENTIN 300 MG/1
300 CAPSULE ORAL 3 TIMES DAILY
Qty: 42 CAPSULE | Refills: 0 | Status: SHIPPED | OUTPATIENT
Start: 2019-02-27 | End: 2021-01-26 | Stop reason: ALTCHOICE

## 2019-02-27 RX ORDER — DULOXETIN HYDROCHLORIDE 60 MG/1
60 CAPSULE, DELAYED RELEASE ORAL DAILY
Qty: 14 CAPSULE | Refills: 0 | Status: SHIPPED | OUTPATIENT
Start: 2019-02-28 | End: 2021-02-25 | Stop reason: DRUGHIGH

## 2019-02-27 RX ORDER — AMOXICILLIN 500 MG/1
500 CAPSULE ORAL EVERY 12 HOURS SCHEDULED
Qty: 7 CAPSULE | Refills: 0 | Status: SHIPPED | OUTPATIENT
Start: 2019-02-27 | End: 2019-03-03

## 2019-02-27 RX ORDER — TRAZODONE HYDROCHLORIDE 50 MG/1
50 TABLET ORAL NIGHTLY PRN
Qty: 14 TABLET | Refills: 0 | Status: SHIPPED | OUTPATIENT
Start: 2019-02-27 | End: 2021-01-26 | Stop reason: DRUGHIGH

## 2019-02-27 RX ADMIN — DIPHENHYDRAMINE HYDROCHLORIDE 25 MG: 50 INJECTION, SOLUTION INTRAMUSCULAR; INTRAVENOUS at 09:13

## 2019-02-27 RX ADMIN — DULOXETINE HYDROCHLORIDE 60 MG: 60 CAPSULE, DELAYED RELEASE ORAL at 08:33

## 2019-02-27 RX ADMIN — VITAM B12 100 MCG: 100 TAB at 08:33

## 2019-02-27 RX ADMIN — AMOXICILLIN 500 MG: 500 CAPSULE ORAL at 08:33

## 2019-02-27 RX ADMIN — ARIPIPRAZOLE 20 MG: 20 TABLET ORAL at 08:33

## 2019-02-27 RX ADMIN — GABAPENTIN 300 MG: 300 CAPSULE ORAL at 08:33

## 2019-02-27 RX ADMIN — DIPHENHYDRAMINE HYDROCHLORIDE 25 MG: 50 INJECTION, SOLUTION INTRAMUSCULAR; INTRAVENOUS at 02:33

## 2019-03-23 ENCOUNTER — APPOINTMENT (OUTPATIENT)
Dept: CT IMAGING | Age: 29
End: 2019-03-23
Payer: COMMERCIAL

## 2019-03-23 ENCOUNTER — HOSPITAL ENCOUNTER (EMERGENCY)
Age: 29
Discharge: LEFT BEFORE DISCHARGE | End: 2019-03-23
Attending: EMERGENCY MEDICINE
Payer: COMMERCIAL

## 2019-03-23 ENCOUNTER — APPOINTMENT (OUTPATIENT)
Dept: ULTRASOUND IMAGING | Age: 29
End: 2019-03-23
Payer: COMMERCIAL

## 2019-03-23 VITALS
OXYGEN SATURATION: 96 % | SYSTOLIC BLOOD PRESSURE: 157 MMHG | WEIGHT: 250 LBS | RESPIRATION RATE: 18 BRPM | HEIGHT: 69 IN | TEMPERATURE: 97.3 F | BODY MASS INDEX: 37.03 KG/M2 | DIASTOLIC BLOOD PRESSURE: 99 MMHG | HEART RATE: 107 BPM

## 2019-03-23 DIAGNOSIS — R10.32 LEFT LOWER QUADRANT PAIN: ICD-10-CM

## 2019-03-23 DIAGNOSIS — N43.3 HYDROCELE, UNSPECIFIED HYDROCELE TYPE: ICD-10-CM

## 2019-03-23 DIAGNOSIS — B86 SCABIES: Primary | ICD-10-CM

## 2019-03-23 LAB
-: ABNORMAL
ABSOLUTE EOS #: 0.06 K/UL (ref 0–0.44)
ABSOLUTE IMMATURE GRANULOCYTE: 0.03 K/UL (ref 0–0.3)
ABSOLUTE LYMPH #: 1.5 K/UL (ref 1.1–3.7)
ABSOLUTE MONO #: 0.57 K/UL (ref 0.1–1.2)
ALBUMIN SERPL-MCNC: 4.4 G/DL (ref 3.5–5.2)
ALBUMIN/GLOBULIN RATIO: 1.3 (ref 1–2.5)
ALP BLD-CCNC: 64 U/L (ref 40–129)
ALT SERPL-CCNC: 28 U/L (ref 5–41)
AMORPHOUS: ABNORMAL
ANION GAP SERPL CALCULATED.3IONS-SCNC: 13 MMOL/L (ref 9–17)
AST SERPL-CCNC: 24 U/L
BACTERIA: ABNORMAL
BASOPHILS # BLD: 0 % (ref 0–2)
BASOPHILS ABSOLUTE: <0.03 K/UL (ref 0–0.2)
BILIRUB SERPL-MCNC: 0.27 MG/DL (ref 0.3–1.2)
BILIRUBIN DIRECT: 0.11 MG/DL
BILIRUBIN URINE: NEGATIVE
BILIRUBIN, INDIRECT: 0.16 MG/DL (ref 0–1)
BUN BLDV-MCNC: 11 MG/DL (ref 6–20)
BUN/CREAT BLD: ABNORMAL (ref 9–20)
CALCIUM SERPL-MCNC: 8.6 MG/DL (ref 8.6–10.4)
CASTS UA: ABNORMAL /LPF (ref 0–8)
CHLORIDE BLD-SCNC: 99 MMOL/L (ref 98–107)
CO2: 26 MMOL/L (ref 20–31)
COLOR: YELLOW
CREAT SERPL-MCNC: 0.76 MG/DL (ref 0.7–1.2)
CRYSTALS, UA: ABNORMAL /HPF
DIFFERENTIAL TYPE: ABNORMAL
EOSINOPHILS RELATIVE PERCENT: 1 % (ref 1–4)
EPITHELIAL CELLS UA: ABNORMAL /HPF (ref 0–5)
GFR AFRICAN AMERICAN: >60 ML/MIN
GFR NON-AFRICAN AMERICAN: >60 ML/MIN
GFR SERPL CREATININE-BSD FRML MDRD: ABNORMAL ML/MIN/{1.73_M2}
GFR SERPL CREATININE-BSD FRML MDRD: ABNORMAL ML/MIN/{1.73_M2}
GLOBULIN: ABNORMAL G/DL (ref 1.5–3.8)
GLUCOSE BLD-MCNC: 119 MG/DL (ref 70–99)
GLUCOSE URINE: NEGATIVE
HCT VFR BLD CALC: 40.7 % (ref 40.7–50.3)
HEMOGLOBIN: 13.1 G/DL (ref 13–17)
IMMATURE GRANULOCYTES: 0 %
KETONES, URINE: ABNORMAL
LEUKOCYTE ESTERASE, URINE: NEGATIVE
LIPASE: 15 U/L (ref 13–60)
LYMPHOCYTES # BLD: 15 % (ref 24–43)
MCH RBC QN AUTO: 29.6 PG (ref 25.2–33.5)
MCHC RBC AUTO-ENTMCNC: 32.2 G/DL (ref 28.4–34.8)
MCV RBC AUTO: 92.1 FL (ref 82.6–102.9)
MONOCYTES # BLD: 6 % (ref 3–12)
MUCUS: ABNORMAL
NITRITE, URINE: NEGATIVE
NRBC AUTOMATED: 0 PER 100 WBC
OTHER OBSERVATIONS UA: ABNORMAL
PDW BLD-RTO: 14.1 % (ref 11.8–14.4)
PH UA: 7 (ref 5–8)
PLATELET # BLD: 284 K/UL (ref 138–453)
PLATELET ESTIMATE: ABNORMAL
PMV BLD AUTO: 9.4 FL (ref 8.1–13.5)
POTASSIUM SERPL-SCNC: 3.7 MMOL/L (ref 3.7–5.3)
PROTEIN UA: ABNORMAL
RBC # BLD: 4.42 M/UL (ref 4.21–5.77)
RBC # BLD: ABNORMAL 10*6/UL
RBC UA: ABNORMAL /HPF (ref 0–4)
RENAL EPITHELIAL, UA: ABNORMAL /HPF
SEG NEUTROPHILS: 78 % (ref 36–65)
SEGMENTED NEUTROPHILS ABSOLUTE COUNT: 7.96 K/UL (ref 1.5–8.1)
SODIUM BLD-SCNC: 138 MMOL/L (ref 135–144)
SPECIFIC GRAVITY UA: 1.03 (ref 1–1.03)
TOTAL PROTEIN: 7.8 G/DL (ref 6.4–8.3)
TRICHOMONAS: ABNORMAL
TURBIDITY: CLEAR
URINE HGB: NEGATIVE
UROBILINOGEN, URINE: NORMAL
WBC # BLD: 10.1 K/UL (ref 3.5–11.3)
WBC # BLD: ABNORMAL 10*3/UL
WBC UA: ABNORMAL /HPF (ref 0–5)
YEAST: ABNORMAL

## 2019-03-23 PROCEDURE — 83690 ASSAY OF LIPASE: CPT

## 2019-03-23 PROCEDURE — 96376 TX/PRO/DX INJ SAME DRUG ADON: CPT

## 2019-03-23 PROCEDURE — 6370000000 HC RX 637 (ALT 250 FOR IP): Performed by: EMERGENCY MEDICINE

## 2019-03-23 PROCEDURE — 81001 URINALYSIS AUTO W/SCOPE: CPT

## 2019-03-23 PROCEDURE — 6360000002 HC RX W HCPCS: Performed by: EMERGENCY MEDICINE

## 2019-03-23 PROCEDURE — 85025 COMPLETE CBC W/AUTO DIFF WBC: CPT

## 2019-03-23 PROCEDURE — 80048 BASIC METABOLIC PNL TOTAL CA: CPT

## 2019-03-23 PROCEDURE — 74176 CT ABD & PELVIS W/O CONTRAST: CPT

## 2019-03-23 PROCEDURE — 87491 CHLMYD TRACH DNA AMP PROBE: CPT

## 2019-03-23 PROCEDURE — 96375 TX/PRO/DX INJ NEW DRUG ADDON: CPT

## 2019-03-23 PROCEDURE — 99284 EMERGENCY DEPT VISIT MOD MDM: CPT

## 2019-03-23 PROCEDURE — 87086 URINE CULTURE/COLONY COUNT: CPT

## 2019-03-23 PROCEDURE — 76870 US EXAM SCROTUM: CPT

## 2019-03-23 PROCEDURE — 93976 VASCULAR STUDY: CPT

## 2019-03-23 PROCEDURE — 87591 N.GONORRHOEAE DNA AMP PROB: CPT

## 2019-03-23 PROCEDURE — 80076 HEPATIC FUNCTION PANEL: CPT

## 2019-03-23 PROCEDURE — 96374 THER/PROPH/DIAG INJ IV PUSH: CPT

## 2019-03-23 PROCEDURE — 2580000003 HC RX 258: Performed by: EMERGENCY MEDICINE

## 2019-03-23 PROCEDURE — 6360000002 HC RX W HCPCS

## 2019-03-23 RX ORDER — FENTANYL CITRATE 50 UG/ML
50 INJECTION, SOLUTION INTRAMUSCULAR; INTRAVENOUS ONCE
Status: COMPLETED | OUTPATIENT
Start: 2019-03-23 | End: 2019-03-23

## 2019-03-23 RX ORDER — KETOROLAC TROMETHAMINE 10 MG/1
10 TABLET, FILM COATED ORAL EVERY 6 HOURS PRN
Qty: 20 TABLET | Refills: 0 | Status: SHIPPED | OUTPATIENT
Start: 2019-03-23 | End: 2021-01-26 | Stop reason: ALTCHOICE

## 2019-03-23 RX ORDER — 0.9 % SODIUM CHLORIDE 0.9 %
1000 INTRAVENOUS SOLUTION INTRAVENOUS ONCE
Status: COMPLETED | OUTPATIENT
Start: 2019-03-23 | End: 2019-03-23

## 2019-03-23 RX ORDER — HYDROXYZINE HYDROCHLORIDE 10 MG/1
10 TABLET, FILM COATED ORAL ONCE
Status: COMPLETED | OUTPATIENT
Start: 2019-03-23 | End: 2019-03-23

## 2019-03-23 RX ORDER — DIPHENHYDRAMINE HCL 25 MG
25 CAPSULE ORAL EVERY 4 HOURS PRN
Qty: 20 CAPSULE | Refills: 0 | Status: SHIPPED | OUTPATIENT
Start: 2019-03-23 | End: 2019-04-02

## 2019-03-23 RX ORDER — DIPHENHYDRAMINE HYDROCHLORIDE 50 MG/ML
25 INJECTION INTRAMUSCULAR; INTRAVENOUS ONCE
Status: COMPLETED | OUTPATIENT
Start: 2019-03-23 | End: 2019-03-23

## 2019-03-23 RX ORDER — PERMETHRIN 50 MG/G
1 CREAM TOPICAL ONCE
Qty: 200 G | Refills: 0 | Status: SHIPPED | OUTPATIENT
Start: 2019-03-23 | End: 2019-03-23

## 2019-03-23 RX ORDER — ONDANSETRON 2 MG/ML
4 INJECTION INTRAMUSCULAR; INTRAVENOUS ONCE
Status: COMPLETED | OUTPATIENT
Start: 2019-03-23 | End: 2019-03-23

## 2019-03-23 RX ORDER — ONDANSETRON 2 MG/ML
INJECTION INTRAMUSCULAR; INTRAVENOUS
Status: COMPLETED
Start: 2019-03-23 | End: 2019-03-23

## 2019-03-23 RX ORDER — ACETAMINOPHEN 325 MG/1
650 TABLET ORAL ONCE
Status: COMPLETED | OUTPATIENT
Start: 2019-03-23 | End: 2019-03-23

## 2019-03-23 RX ADMIN — SODIUM CHLORIDE 1000 ML: 9 INJECTION, SOLUTION INTRAVENOUS at 12:30

## 2019-03-23 RX ADMIN — ONDANSETRON 4 MG: 2 INJECTION INTRAMUSCULAR; INTRAVENOUS at 14:07

## 2019-03-23 RX ADMIN — DIPHENHYDRAMINE HYDROCHLORIDE 25 MG: 50 INJECTION, SOLUTION INTRAMUSCULAR; INTRAVENOUS at 15:30

## 2019-03-23 RX ADMIN — DIPHENHYDRAMINE HYDROCHLORIDE 25 MG: 50 INJECTION, SOLUTION INTRAMUSCULAR; INTRAVENOUS at 12:30

## 2019-03-23 RX ADMIN — FENTANYL CITRATE 50 MCG: 50 INJECTION, SOLUTION INTRAMUSCULAR; INTRAVENOUS at 14:07

## 2019-03-23 RX ADMIN — ACETAMINOPHEN 650 MG: 325 TABLET ORAL at 12:30

## 2019-03-23 RX ADMIN — HYDROXYZINE HYDROCHLORIDE 10 MG: 10 TABLET ORAL at 13:53

## 2019-03-23 RX ADMIN — ONDANSETRON 4 MG: 2 INJECTION INTRAMUSCULAR; INTRAVENOUS at 12:30

## 2019-03-23 ASSESSMENT — PAIN SCALES - GENERAL
PAINLEVEL_OUTOF10: 10

## 2019-03-23 ASSESSMENT — PAIN DESCRIPTION - DESCRIPTORS: DESCRIPTORS: ACHING;CONSTANT

## 2019-03-23 ASSESSMENT — PAIN DESCRIPTION - FREQUENCY: FREQUENCY: CONTINUOUS

## 2019-03-23 ASSESSMENT — PAIN DESCRIPTION - LOCATION: LOCATION: HEAD

## 2019-03-24 LAB
CULTURE: NO GROWTH
Lab: NORMAL
SPECIMEN DESCRIPTION: NORMAL

## 2019-03-25 LAB
C. TRACHOMATIS DNA ,URINE: NEGATIVE
N. GONORRHOEAE DNA, URINE: NEGATIVE
SPECIMEN DESCRIPTION: NORMAL

## 2019-07-19 ENCOUNTER — HOSPITAL ENCOUNTER (EMERGENCY)
Age: 29
Discharge: HOME | End: 2019-07-19
Attending: EMERGENCY MEDICINE
Payer: MEDICARE

## 2019-07-19 VITALS
HEART RATE: 89 BPM | WEIGHT: 255 LBS | DIASTOLIC BLOOD PRESSURE: 99 MMHG | BODY MASS INDEX: 38.65 KG/M2 | OXYGEN SATURATION: 98 % | TEMPERATURE: 98.2 F | HEIGHT: 68 IN | RESPIRATION RATE: 16 BRPM | SYSTOLIC BLOOD PRESSURE: 169 MMHG

## 2019-07-19 DIAGNOSIS — B86 SCABIES: Primary | ICD-10-CM

## 2019-07-19 DIAGNOSIS — R10.32 ABDOMINAL PAIN, LEFT LOWER QUADRANT: ICD-10-CM

## 2019-07-19 LAB
ABSOLUTE EOS #: 0.14 K/UL (ref 0–0.44)
ABSOLUTE IMMATURE GRANULOCYTE: <0.03 K/UL (ref 0–0.3)
ABSOLUTE LYMPH #: 2.23 K/UL (ref 1.1–3.7)
ABSOLUTE MONO #: 0.52 K/UL (ref 0.1–1.2)
ALBUMIN SERPL-MCNC: 3.6 G/DL (ref 3.5–5.2)
ALBUMIN/GLOBULIN RATIO: 1.2 (ref 1–2.5)
ALP BLD-CCNC: 58 U/L (ref 40–129)
ALT SERPL-CCNC: 13 U/L (ref 5–41)
ANION GAP SERPL CALCULATED.3IONS-SCNC: 11 MMOL/L (ref 9–17)
AST SERPL-CCNC: 15 U/L
BASOPHILS # BLD: 0 % (ref 0–2)
BASOPHILS ABSOLUTE: <0.03 K/UL (ref 0–0.2)
BILIRUB SERPL-MCNC: <0.1 MG/DL (ref 0.3–1.2)
BILIRUBIN URINE: NEGATIVE
BUN BLDV-MCNC: 11 MG/DL (ref 6–20)
BUN/CREAT BLD: ABNORMAL (ref 9–20)
CALCIUM SERPL-MCNC: 9.1 MG/DL (ref 8.6–10.4)
CHLORIDE BLD-SCNC: 102 MMOL/L (ref 98–107)
CO2: 25 MMOL/L (ref 20–31)
COLOR: YELLOW
COMMENT UA: NORMAL
CREAT SERPL-MCNC: 0.79 MG/DL (ref 0.7–1.2)
DIFFERENTIAL TYPE: NORMAL
EOSINOPHILS RELATIVE PERCENT: 3 % (ref 1–4)
GFR AFRICAN AMERICAN: >60 ML/MIN
GFR NON-AFRICAN AMERICAN: >60 ML/MIN
GFR SERPL CREATININE-BSD FRML MDRD: ABNORMAL ML/MIN/{1.73_M2}
GFR SERPL CREATININE-BSD FRML MDRD: ABNORMAL ML/MIN/{1.73_M2}
GLUCOSE BLD-MCNC: 113 MG/DL (ref 70–99)
GLUCOSE URINE: NEGATIVE
HCT VFR BLD CALC: 43.9 % (ref 40.7–50.3)
HEMOGLOBIN: 14.2 G/DL (ref 13–17)
IMMATURE GRANULOCYTES: 0 %
KETONES, URINE: NEGATIVE
LACTIC ACID, WHOLE BLOOD: 1.8 MMOL/L (ref 0.7–2.1)
LEUKOCYTE ESTERASE, URINE: NEGATIVE
LIPASE: 21 U/L (ref 13–60)
LYMPHOCYTES # BLD: 42 % (ref 24–43)
MCH RBC QN AUTO: 29 PG (ref 25.2–33.5)
MCHC RBC AUTO-ENTMCNC: 32.3 G/DL (ref 28.4–34.8)
MCV RBC AUTO: 89.8 FL (ref 82.6–102.9)
MONOCYTES # BLD: 10 % (ref 3–12)
NITRITE, URINE: NEGATIVE
NRBC AUTOMATED: 0 PER 100 WBC
PDW BLD-RTO: 13.7 % (ref 11.8–14.4)
PH UA: 5.5 (ref 5–8)
PLATELET # BLD: 246 K/UL (ref 138–453)
PLATELET ESTIMATE: NORMAL
PMV BLD AUTO: 9.9 FL (ref 8.1–13.5)
POTASSIUM SERPL-SCNC: 4.5 MMOL/L (ref 3.7–5.3)
PROTEIN UA: NEGATIVE
RBC # BLD: 4.89 M/UL (ref 4.21–5.77)
RBC # BLD: NORMAL 10*6/UL
SEG NEUTROPHILS: 45 % (ref 36–65)
SEGMENTED NEUTROPHILS ABSOLUTE COUNT: 2.33 K/UL (ref 1.5–8.1)
SODIUM BLD-SCNC: 138 MMOL/L (ref 135–144)
SPECIFIC GRAVITY UA: 1.02 (ref 1–1.03)
TOTAL PROTEIN: 6.6 G/DL (ref 6.4–8.3)
TURBIDITY: CLEAR
URINE HGB: NEGATIVE
UROBILINOGEN, URINE: NORMAL
WBC # BLD: 5.3 K/UL (ref 3.5–11.3)
WBC # BLD: NORMAL 10*3/UL

## 2019-07-19 PROCEDURE — 6370000000 HC RX 637 (ALT 250 FOR IP): Performed by: EMERGENCY MEDICINE

## 2019-07-19 PROCEDURE — 99284 EMERGENCY DEPT VISIT MOD MDM: CPT

## 2019-07-19 PROCEDURE — 83605 ASSAY OF LACTIC ACID: CPT

## 2019-07-19 PROCEDURE — 80053 COMPREHEN METABOLIC PANEL: CPT

## 2019-07-19 PROCEDURE — 81003 URINALYSIS AUTO W/O SCOPE: CPT

## 2019-07-19 PROCEDURE — 96374 THER/PROPH/DIAG INJ IV PUSH: CPT

## 2019-07-19 PROCEDURE — 96375 TX/PRO/DX INJ NEW DRUG ADDON: CPT

## 2019-07-19 PROCEDURE — 2580000003 HC RX 258: Performed by: STUDENT IN AN ORGANIZED HEALTH CARE EDUCATION/TRAINING PROGRAM

## 2019-07-19 PROCEDURE — 85025 COMPLETE CBC W/AUTO DIFF WBC: CPT

## 2019-07-19 PROCEDURE — 83690 ASSAY OF LIPASE: CPT

## 2019-07-19 PROCEDURE — 6360000002 HC RX W HCPCS: Performed by: STUDENT IN AN ORGANIZED HEALTH CARE EDUCATION/TRAINING PROGRAM

## 2019-07-19 RX ORDER — DIPHENHYDRAMINE HCL 25 MG
25 CAPSULE ORAL EVERY 4 HOURS PRN
Qty: 1 CAPSULE | Refills: 0 | Status: SHIPPED | OUTPATIENT
Start: 2019-07-19 | End: 2019-07-29

## 2019-07-19 RX ORDER — ONDANSETRON 2 MG/ML
4 INJECTION INTRAMUSCULAR; INTRAVENOUS ONCE
Status: DISCONTINUED | OUTPATIENT
Start: 2019-07-19 | End: 2019-07-19 | Stop reason: HOSPADM

## 2019-07-19 RX ORDER — SODIUM CHLORIDE, SODIUM LACTATE, POTASSIUM CHLORIDE, AND CALCIUM CHLORIDE .6; .31; .03; .02 G/100ML; G/100ML; G/100ML; G/100ML
1000 INJECTION, SOLUTION INTRAVENOUS ONCE
Status: COMPLETED | OUTPATIENT
Start: 2019-07-19 | End: 2019-07-19

## 2019-07-19 RX ORDER — ONDANSETRON 2 MG/ML
4 INJECTION INTRAMUSCULAR; INTRAVENOUS ONCE
Status: COMPLETED | OUTPATIENT
Start: 2019-07-19 | End: 2019-07-19

## 2019-07-19 RX ORDER — PERMETHRIN 50 MG/G
CREAM TOPICAL
Qty: 60 G | Refills: 0 | Status: SHIPPED | OUTPATIENT
Start: 2019-07-19 | End: 2020-01-15 | Stop reason: SDUPTHER

## 2019-07-19 RX ORDER — DIPHENHYDRAMINE HCL 25 MG
50 TABLET ORAL ONCE
Status: COMPLETED | OUTPATIENT
Start: 2019-07-19 | End: 2019-07-19

## 2019-07-19 RX ORDER — KETOROLAC TROMETHAMINE 15 MG/ML
15 INJECTION, SOLUTION INTRAMUSCULAR; INTRAVENOUS ONCE
Status: COMPLETED | OUTPATIENT
Start: 2019-07-19 | End: 2019-07-19

## 2019-07-19 RX ADMIN — DIPHENHYDRAMINE HCL 50 MG: 25 TABLET ORAL at 09:50

## 2019-07-19 RX ADMIN — SODIUM CHLORIDE, POTASSIUM CHLORIDE, SODIUM LACTATE AND CALCIUM CHLORIDE 1000 ML: 600; 310; 30; 20 INJECTION, SOLUTION INTRAVENOUS at 08:39

## 2019-07-19 RX ADMIN — KETOROLAC TROMETHAMINE 15 MG: 15 INJECTION, SOLUTION INTRAMUSCULAR; INTRAVENOUS at 08:39

## 2019-07-19 RX ADMIN — ONDANSETRON 4 MG: 2 INJECTION INTRAMUSCULAR; INTRAVENOUS at 08:39

## 2019-07-19 ASSESSMENT — ENCOUNTER SYMPTOMS
COUGH: 0
WHEEZING: 0
SINUS PAIN: 0
CONSTIPATION: 0
RHINORRHEA: 0
SHORTNESS OF BREATH: 0
DIARRHEA: 1
BLOOD IN STOOL: 0
EYE REDNESS: 0
EYE PAIN: 0
APNEA: 0
NAUSEA: 1
VOMITING: 1
ABDOMINAL PAIN: 1

## 2019-07-19 ASSESSMENT — PAIN DESCRIPTION - PAIN TYPE: TYPE: CHRONIC PAIN

## 2019-07-19 ASSESSMENT — PAIN DESCRIPTION - LOCATION: LOCATION: ABDOMEN

## 2019-07-19 ASSESSMENT — PAIN DESCRIPTION - ORIENTATION: ORIENTATION: LEFT;LOWER

## 2019-07-19 ASSESSMENT — PAIN DESCRIPTION - DESCRIPTORS: DESCRIPTORS: CONSTANT

## 2019-07-19 ASSESSMENT — PAIN DESCRIPTION - FREQUENCY: FREQUENCY: CONTINUOUS

## 2019-07-19 ASSESSMENT — PAIN - FUNCTIONAL ASSESSMENT: PAIN_FUNCTIONAL_ASSESSMENT: ACTIVITIES ARE NOT PREVENTED

## 2019-07-19 ASSESSMENT — PAIN DESCRIPTION - PROGRESSION: CLINICAL_PROGRESSION: NOT CHANGED

## 2019-07-19 ASSESSMENT — PAIN DESCRIPTION - ONSET: ONSET: ON-GOING

## 2019-07-19 ASSESSMENT — PAIN SCALES - GENERAL
PAINLEVEL_OUTOF10: 5
PAINLEVEL_OUTOF10: 3

## 2019-07-19 NOTE — ED PROVIDER NOTES
[facility] ED  Emergency Department Encounter  EmergencyMedicineResident     Pt Name: Marcia Gamboa  MRN: [id number]  [place] 1990  Date ofevaluation: 7/19/19  PCP:  No primary care provider on file. CHIEF COMPLAINT       Chief Complaint   Patient presents with    Rash    Abdominal Pain       HISTORY OF PRESENT ILLNESS  (Location/Symptom, Timing/Onset, Context/Setting, Quality, Duration, Modifying Factors, Severity.)      Marcia Gamboa is a 29 y.o. male who presents with pruritic rash that is been present for the past couple weeks. Patient was seen in his primary care clinic and given permethrin cream for scabies, however he states that his roommate was not treated and now he believes that the scabies is coming back. Patient has been scratching at his upper chest and his entire back. He has a papular rash throughout the upper chest on the back. Patient also has a separate complaint of abdominal pain in the lower left quadrant that started about 3 to 4 days ago. He has had associated nausea, vomiting, diarrhea, lightheadedness, dizziness. Hx of cholecystectomy. EGD showed gastric ulcers.     PAST MEDICAL / SURGICAL /SOCIAL / FAMILY HISTORY      has a past medical history of Anxiety, Arthritis, Asthma, Bipolar I disorder, most recent episode (or current) depressed, unspecified, Clostridium difficile infection, COPD (chronic obstructive pulmonary disease) ([address] Depression, Disease of blood and blood forming organ, Eczema, Fracture, metacarpal, Gastric ulcer, Gastritis, GERD (gastroesophageal reflux disease), GI bleed, H. pylori infection, H/O blood clots, Head injury, Headache, Insomnia, Juvenile rheumatoid arthritis ([address] Neuromuscular disorder ([address] PFAPA syndrome ([address] PUD (peptic ulcer disease), Rheumatoid arthritis ([address] Rheumatoid arthritis(714.0), Sleep apnea, 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[address] Substance abuse ([address] Suicidal ideation, Suicide attempt by hanging ([address] and neck stiffness. Skin: Negative for rash. Neurological: Positive for dizziness and light-headedness. Negative for syncope, weakness and headaches. PHYSICAL EXAM   (up to 7 for level 4, 8 or more forlevel 5)      ED TRIAGE VITALS BP: (!) 169/99, Temp: 98.2 °F (36.8 °C), Pulse: 89, Resp: 16, SpO2: 98 %    Vitals:    07/19/19 0750 07/19/19 1017   BP: (!) 169/99    Pulse: 101 89   Resp: 16    Temp: 98.2 °F (36.8 °C)    TempSrc: Oral    SpO2: 98%    Weight: 255 lb (115.7 kg)    Height: 5' 8\" (1.727 m)          Physical Exam   Constitutional: He is oriented to person, place, and time. He appears well-developed and well-nourished. No distress. HENT:   Head: Normocephalic and atraumatic. Right Ear: External ear normal.   Left Ear: External ear normal.   Nose: Nose normal.   Mouth/Throat: Oropharynx is clear and moist. No oropharyngeal exudate. Eyes: Pupils are equal, round, and reactive to light. Conjunctivae and EOM are normal. Right eye exhibits no discharge. Left eye exhibits no discharge. No scleral icterus. Neck: Normal range of motion. No tracheal deviation present. Cardiovascular: Normal rate, regular rhythm, normal heart sounds and intact distal pulses. Exam reveals no gallop and no friction rub. No murmur heard. Pulmonary/Chest: Effort normal and breath sounds normal. No stridor. No respiratory distress. He has no wheezes. He has no rales. Abdominal: Soft. Bowel sounds are normal. He exhibits no distension and no mass. There is tenderness (Tenderness in LLQ. LLQ hurts with palpation in any part of the abdomen. No peritoneal signs. ). There is no rebound and no guarding. No peritoneal signs. Musculoskeletal: Normal range of motion. Neurological: He is alert and oriented to person, place, and time. He exhibits normal muscle tone. Coordination normal.   Skin: Skin is warm and dry. No rash noted. He is not diaphoretic.    Diffuse erythematous papular rash on the upper chest and all over rales, rhonchi, Abd soft, tender in LLQ, referred pain to LLQ with palpation around the abdomen, nonperitoneal, nondistended. Nontoxic, not ill appearing.  Will check some basic abdominal labs and prescribe permethrin cream.       LABS:  Results for orders placed or performed during the hospital encounter of 07/19/19   CBC Auto Differential   Result Value Ref Range    WBC 5.3 3.5 - 11.3 k/uL    RBC 4.89 4.21 - 5.77 m/uL    Hemoglobin 14.2 13.0 - 17.0 g/dL    Hematocrit 43.9 40.7 - 50.3 %    MCV 89.8 82.6 - 102.9 fL    MCH 29.0 25.2 - 33.5 pg    MCHC 32.3 28.4 - 34.8 g/dL    RDW 13.7 11.8 - 14.4 %    Platelets 514 734 - 288 k/uL    MPV 9.9 8.1 - 13.5 fL    NRBC Automated 0.0 0.0 per 100 WBC    Differential Type NOT REPORTED     Seg Neutrophils 45 36 - 65 %    Lymphocytes 42 24 - 43 %    Monocytes 10 3 - 12 %    Eosinophils % 3 1 - 4 %    Basophils 0 0 - 2 %    Immature Granulocytes 0 0 %    Segs Absolute 2.33 1.50 - 8.10 k/uL    Absolute Lymph # 2.23 1.10 - 3.70 k/uL    Absolute Mono # 0.52 0.10 - 1.20 k/uL    Absolute Eos # 0.14 0.00 - 0.44 k/uL    Basophils # <0.03 0.00 - 0.20 k/uL    Absolute Immature Granulocyte <0.03 0.00 - 0.30 k/uL    WBC Morphology NOT REPORTED     RBC Morphology NOT REPORTED     Platelet Estimate NOT REPORTED    COMPREHENSIVE METABOLIC PANEL   Result Value Ref Range    Glucose 113 (H) 70 - 99 mg/dL    BUN 11 6 - 20 mg/dL    CREATININE 0.79 0.70 - 1.20 mg/dL    Bun/Cre Ratio NOT REPORTED 9 - 20    Calcium 9.1 8.6 - 10.4 mg/dL    Sodium 138 135 - 144 mmol/L    Potassium 4.5 3.7 - 5.3 mmol/L    Chloride 102 98 - 107 mmol/L    CO2 25 20 - 31 mmol/L    Anion Gap 11 9 - 17 mmol/L    Alkaline Phosphatase 58 40 - 129 U/L    ALT 13 5 - 41 U/L    AST 15 <40 U/L    Total Bilirubin <0.10 (L) 0.3 - 1.2 mg/dL    Total Protein 6.6 6.4 - 8.3 g/dL    Alb 3.6 3.5 - 5.2 g/dL    Albumin/Globulin Ratio 1.2 1.0 - 2.5    GFR Non-African American >60 >60 mL/min    GFR African American >60 >60 mL/min    GFR Comment GFR Staging NOT REPORTED    LACTIC ACID, WHOLE BLOOD   Result Value Ref Range    Lactic Acid, Whole Blood 1.8 0.7 - 2.1 mmol/L   LIPASE   Result Value Ref Range    Lipase 21 13 - 60 U/L   Urinalysis Reflex to Culture   Result Value Ref Range    Color, UA YELLOW YELLOW    Turbidity UA CLEAR CLEAR    Glucose, Ur NEGATIVE NEGATIVE    Bilirubin Urine NEGATIVE NEGATIVE    Ketones, Urine NEGATIVE NEGATIVE    Specific Gravity, UA 1.023 1.005 - 1.030    Urine Hgb NEGATIVE NEGATIVE    pH, UA 5.5 5.0 - 8.0    Protein, UA NEGATIVE NEGATIVE    Urobilinogen, Urine Normal Normal    Nitrite, Urine NEGATIVE NEGATIVE    Leukocyte Esterase, Urine NEGATIVE NEGATIVE    Urinalysis Comments       Microscopic exam not performed based on chemical results unless requested in original order. RADIOLOGY:  No orders to display       ECG:  None     All EKG's are interpreted by the Emergency Department Physician who either signsor Co-signs this chart in the absence of a cardiologist.    BEDSIDE ULTRASOUND:  None     EMERGENCY DEPARTMENT COURSE:    ED Course as of Jul 19 1805 Fri Jul 19, 2019   1016 Notified that the patient vomited his Benadryl. His labs are largely unremarkable. Lactic acid 1.8, lipase 21, white blood cell count 5.3. We will discharge him at this time. Will prescribe permethrin cream.    [SM]      ED Course User Index  [SM] Zach Alonso MD   Labs largely unremarkable. Patient was given another dose of zofran. He was counseled to follow up with PCP and given appropriate ED return precautions. Patient was discharged in stable condition. PROCEDURES:  None     CONSULTS:  None    CRITICAL CARE:  See attending physician note    FINAL IMPRESSION      1. Scabies    2.  Abdominal pain, left lower quadrant          DISPOSITION / PLAN     DISPOSITION Decision To Discharge 07/19/2019 10:16:51 AM  Discharge to home     PATIENT REFERRED TO:  [facility]  [address] [facility]

## 2019-10-11 ENCOUNTER — HOSPITAL ENCOUNTER (EMERGENCY)
Age: 29
Discharge: HOME | End: 2019-10-11
Attending: EMERGENCY MEDICINE
Payer: MEDICARE

## 2019-10-11 ENCOUNTER — APPOINTMENT (OUTPATIENT)
Dept: GENERAL RADIOLOGY | Age: 29
End: 2019-10-11
Payer: MEDICARE

## 2019-10-11 VITALS
SYSTOLIC BLOOD PRESSURE: 135 MMHG | BODY MASS INDEX: 31.17 KG/M2 | OXYGEN SATURATION: 96 % | RESPIRATION RATE: 16 BRPM | HEART RATE: 91 BPM | DIASTOLIC BLOOD PRESSURE: 77 MMHG | WEIGHT: 205 LBS | TEMPERATURE: 98.2 F

## 2019-10-11 VITALS
HEART RATE: 107 BPM | OXYGEN SATURATION: 94 % | HEIGHT: 68 IN | RESPIRATION RATE: 16 BRPM | WEIGHT: 205 LBS | SYSTOLIC BLOOD PRESSURE: 106 MMHG | DIASTOLIC BLOOD PRESSURE: 71 MMHG | BODY MASS INDEX: 31.07 KG/M2 | TEMPERATURE: 99.2 F

## 2019-10-11 DIAGNOSIS — J40 BRONCHITIS: Primary | ICD-10-CM

## 2019-10-11 DIAGNOSIS — R05.9 COUGH: ICD-10-CM

## 2019-10-11 DIAGNOSIS — R07.89 CHEST WALL PAIN: ICD-10-CM

## 2019-10-11 DIAGNOSIS — R10.9 ABDOMINAL PAIN, UNSPECIFIED ABDOMINAL LOCATION: Primary | ICD-10-CM

## 2019-10-11 DIAGNOSIS — K29.01 ACUTE GASTRITIS WITH HEMORRHAGE, UNSPECIFIED GASTRITIS TYPE: ICD-10-CM

## 2019-10-11 LAB
ABSOLUTE EOS #: <0.03 K/UL (ref 0–0.44)
ABSOLUTE IMMATURE GRANULOCYTE: <0.03 K/UL (ref 0–0.3)
ABSOLUTE LYMPH #: 1.2 K/UL (ref 1.1–3.7)
ABSOLUTE MONO #: 0.49 K/UL (ref 0.1–1.2)
ALBUMIN SERPL-MCNC: 3.9 G/DL (ref 3.5–5.2)
ALBUMIN/GLOBULIN RATIO: 0.9 (ref 1–2.5)
ALP BLD-CCNC: 73 U/L (ref 40–129)
ALT SERPL-CCNC: 11 U/L (ref 5–41)
ANION GAP SERPL CALCULATED.3IONS-SCNC: 14 MMOL/L (ref 9–17)
AST SERPL-CCNC: 21 U/L
BASOPHILS # BLD: 1 % (ref 0–2)
BASOPHILS ABSOLUTE: 0.03 K/UL (ref 0–0.2)
BILIRUB SERPL-MCNC: 0.23 MG/DL (ref 0.3–1.2)
BUN BLDV-MCNC: 6 MG/DL (ref 6–20)
BUN/CREAT BLD: ABNORMAL (ref 9–20)
CALCIUM SERPL-MCNC: 9.3 MG/DL (ref 8.6–10.4)
CHLORIDE BLD-SCNC: 105 MMOL/L (ref 98–107)
CO2: 22 MMOL/L (ref 20–31)
CREAT SERPL-MCNC: 0.75 MG/DL (ref 0.7–1.2)
DIFFERENTIAL TYPE: ABNORMAL
EOSINOPHILS RELATIVE PERCENT: 0 % (ref 1–4)
GFR AFRICAN AMERICAN: >60 ML/MIN
GFR NON-AFRICAN AMERICAN: >60 ML/MIN
GFR SERPL CREATININE-BSD FRML MDRD: ABNORMAL ML/MIN/{1.73_M2}
GFR SERPL CREATININE-BSD FRML MDRD: ABNORMAL ML/MIN/{1.73_M2}
GLUCOSE BLD-MCNC: 141 MG/DL (ref 70–99)
HAV IGM SER IA-ACNC: NONREACTIVE
HCT VFR BLD CALC: 49.7 % (ref 40.7–50.3)
HEMOGLOBIN: 16.5 G/DL (ref 13–17)
HEPATITIS B CORE IGM ANTIBODY: NONREACTIVE
HEPATITIS B SURFACE ANTIGEN: NONREACTIVE
HEPATITIS C ANTIBODY: NONREACTIVE
HIV AG/AB: NONREACTIVE
IMMATURE GRANULOCYTES: 0 %
LIPASE: 15 U/L (ref 13–60)
LYMPHOCYTES # BLD: 18 % (ref 24–43)
MCH RBC QN AUTO: 29.2 PG (ref 25.2–33.5)
MCHC RBC AUTO-ENTMCNC: 33.2 G/DL (ref 28.4–34.8)
MCV RBC AUTO: 88 FL (ref 82.6–102.9)
MONOCYTES # BLD: 8 % (ref 3–12)
NRBC AUTOMATED: 0 PER 100 WBC
PDW BLD-RTO: 13.1 % (ref 11.8–14.4)
PLATELET # BLD: 310 K/UL (ref 138–453)
PLATELET ESTIMATE: ABNORMAL
PMV BLD AUTO: 10.5 FL (ref 8.1–13.5)
POTASSIUM SERPL-SCNC: 4 MMOL/L (ref 3.7–5.3)
RBC # BLD: 5.65 M/UL (ref 4.21–5.77)
RBC # BLD: ABNORMAL 10*6/UL
SEG NEUTROPHILS: 73 % (ref 36–65)
SEGMENTED NEUTROPHILS ABSOLUTE COUNT: 4.84 K/UL (ref 1.5–8.1)
SODIUM BLD-SCNC: 141 MMOL/L (ref 135–144)
T. PALLIDUM, IGG: NONREACTIVE
TOTAL PROTEIN: 8.2 G/DL (ref 6.4–8.3)
WBC # BLD: 6.6 K/UL (ref 3.5–11.3)
WBC # BLD: ABNORMAL 10*3/UL

## 2019-10-11 PROCEDURE — 80074 ACUTE HEPATITIS PANEL: CPT

## 2019-10-11 PROCEDURE — C9113 INJ PANTOPRAZOLE SODIUM, VIA: HCPCS | Performed by: EMERGENCY MEDICINE

## 2019-10-11 PROCEDURE — 85025 COMPLETE CBC W/AUTO DIFF WBC: CPT

## 2019-10-11 PROCEDURE — 96374 THER/PROPH/DIAG INJ IV PUSH: CPT

## 2019-10-11 PROCEDURE — 6360000002 HC RX W HCPCS: Performed by: EMERGENCY MEDICINE

## 2019-10-11 PROCEDURE — 96372 THER/PROPH/DIAG INJ SC/IM: CPT

## 2019-10-11 PROCEDURE — 6360000002 HC RX W HCPCS: Performed by: PHYSICIAN ASSISTANT

## 2019-10-11 PROCEDURE — 96375 TX/PRO/DX INJ NEW DRUG ADDON: CPT

## 2019-10-11 PROCEDURE — 99284 EMERGENCY DEPT VISIT MOD MDM: CPT

## 2019-10-11 PROCEDURE — 86780 TREPONEMA PALLIDUM: CPT

## 2019-10-11 PROCEDURE — 80053 COMPREHEN METABOLIC PANEL: CPT

## 2019-10-11 PROCEDURE — 6370000000 HC RX 637 (ALT 250 FOR IP): Performed by: PHYSICIAN ASSISTANT

## 2019-10-11 PROCEDURE — 71046 X-RAY EXAM CHEST 2 VIEWS: CPT

## 2019-10-11 PROCEDURE — 83690 ASSAY OF LIPASE: CPT

## 2019-10-11 PROCEDURE — 2580000003 HC RX 258: Performed by: EMERGENCY MEDICINE

## 2019-10-11 PROCEDURE — 6370000000 HC RX 637 (ALT 250 FOR IP): Performed by: EMERGENCY MEDICINE

## 2019-10-11 PROCEDURE — 87389 HIV-1 AG W/HIV-1&-2 AB AG IA: CPT

## 2019-10-11 PROCEDURE — 99283 EMERGENCY DEPT VISIT LOW MDM: CPT

## 2019-10-11 RX ORDER — ONDANSETRON 4 MG/1
4 TABLET, ORALLY DISINTEGRATING ORAL EVERY 8 HOURS PRN
Qty: 10 TABLET | Refills: 0 | Status: SHIPPED | OUTPATIENT
Start: 2019-10-11 | End: 2021-01-26

## 2019-10-11 RX ORDER — LIDOCAINE HYDROCHLORIDE 20 MG/ML
15 SOLUTION OROPHARYNGEAL ONCE
Status: COMPLETED | OUTPATIENT
Start: 2019-10-11 | End: 2019-10-11

## 2019-10-11 RX ORDER — ACETAMINOPHEN 500 MG
1000 TABLET ORAL ONCE
Status: COMPLETED | OUTPATIENT
Start: 2019-10-11 | End: 2019-10-11

## 2019-10-11 RX ORDER — DIPHENHYDRAMINE HYDROCHLORIDE 50 MG/ML
25 INJECTION INTRAMUSCULAR; INTRAVENOUS ONCE
Status: COMPLETED | OUTPATIENT
Start: 2019-10-11 | End: 2019-10-11

## 2019-10-11 RX ORDER — 0.9 % SODIUM CHLORIDE 0.9 %
10 VIAL (ML) INJECTION ONCE
Status: COMPLETED | OUTPATIENT
Start: 2019-10-11 | End: 2019-10-11

## 2019-10-11 RX ORDER — NAPROXEN 500 MG/1
500 TABLET ORAL 2 TIMES DAILY
Qty: 60 TABLET | Refills: 0 | Status: SHIPPED | OUTPATIENT
Start: 2019-10-11 | End: 2020-09-19

## 2019-10-11 RX ORDER — MAGNESIUM HYDROXIDE/ALUMINUM HYDROXICE/SIMETHICONE 120; 1200; 1200 MG/30ML; MG/30ML; MG/30ML
30 SUSPENSION ORAL ONCE
Status: COMPLETED | OUTPATIENT
Start: 2019-10-11 | End: 2019-10-11

## 2019-10-11 RX ORDER — LIDOCAINE 4 G/G
1 PATCH TOPICAL DAILY
Status: DISCONTINUED | OUTPATIENT
Start: 2019-10-11 | End: 2019-10-11 | Stop reason: HOSPADM

## 2019-10-11 RX ORDER — AZITHROMYCIN 250 MG/1
1000 TABLET, FILM COATED ORAL ONCE
Status: COMPLETED | OUTPATIENT
Start: 2019-10-11 | End: 2019-10-11

## 2019-10-11 RX ORDER — PROMETHAZINE HYDROCHLORIDE 25 MG/1
25 TABLET ORAL ONCE
Status: COMPLETED | OUTPATIENT
Start: 2019-10-11 | End: 2019-10-11

## 2019-10-11 RX ORDER — PANTOPRAZOLE SODIUM 40 MG/1
40 TABLET, DELAYED RELEASE ORAL
Qty: 30 TABLET | Refills: 0 | Status: SHIPPED | OUTPATIENT
Start: 2019-10-11 | End: 2021-01-26

## 2019-10-11 RX ORDER — DIAZEPAM 2 MG/1
2 TABLET ORAL EVERY 8 HOURS PRN
Qty: 20 TABLET | Refills: 0 | Status: SHIPPED | OUTPATIENT
Start: 2019-10-11 | End: 2019-10-21

## 2019-10-11 RX ORDER — KETOROLAC TROMETHAMINE 30 MG/ML
30 INJECTION, SOLUTION INTRAMUSCULAR; INTRAVENOUS ONCE
Status: COMPLETED | OUTPATIENT
Start: 2019-10-11 | End: 2019-10-11

## 2019-10-11 RX ORDER — 0.9 % SODIUM CHLORIDE 0.9 %
1000 INTRAVENOUS SOLUTION INTRAVENOUS ONCE
Status: COMPLETED | OUTPATIENT
Start: 2019-10-11 | End: 2019-10-11

## 2019-10-11 RX ORDER — LIDOCAINE 50 MG/G
1 PATCH TOPICAL DAILY
Qty: 15 PATCH | Refills: 0 | Status: SHIPPED | OUTPATIENT
Start: 2019-10-11 | End: 2019-10-21

## 2019-10-11 RX ORDER — PANTOPRAZOLE SODIUM 40 MG/10ML
40 INJECTION, POWDER, LYOPHILIZED, FOR SOLUTION INTRAVENOUS ONCE
Status: COMPLETED | OUTPATIENT
Start: 2019-10-11 | End: 2019-10-11

## 2019-10-11 RX ORDER — ALBUTEROL SULFATE 90 UG/1
2 AEROSOL, METERED RESPIRATORY (INHALATION) EVERY 6 HOURS PRN
Qty: 18 G | Refills: 0 | Status: ON HOLD | OUTPATIENT
Start: 2019-10-11 | End: 2021-07-22

## 2019-10-11 RX ORDER — GUAIFENESIN 600 MG/1
600 TABLET, EXTENDED RELEASE ORAL 2 TIMES DAILY
Qty: 30 TABLET | Refills: 0 | Status: SHIPPED | OUTPATIENT
Start: 2019-10-11 | End: 2019-10-26

## 2019-10-11 RX ORDER — CEFTRIAXONE SODIUM 250 MG/1
250 INJECTION, POWDER, FOR SOLUTION INTRAMUSCULAR; INTRAVENOUS ONCE
Status: COMPLETED | OUTPATIENT
Start: 2019-10-11 | End: 2019-10-11

## 2019-10-11 RX ORDER — ONDANSETRON 2 MG/ML
4 INJECTION INTRAMUSCULAR; INTRAVENOUS ONCE
Status: COMPLETED | OUTPATIENT
Start: 2019-10-11 | End: 2019-10-11

## 2019-10-11 RX ADMIN — DIPHENHYDRAMINE HYDROCHLORIDE 25 MG: 50 INJECTION, SOLUTION INTRAMUSCULAR; INTRAVENOUS at 13:17

## 2019-10-11 RX ADMIN — ALUMINUM HYDROXIDE, MAGNESIUM HYDROXIDE, AND SIMETHICONE 30 ML: 200; 200; 20 SUSPENSION ORAL at 21:22

## 2019-10-11 RX ADMIN — AZITHROMYCIN 1000 MG: 250 TABLET, FILM COATED ORAL at 13:16

## 2019-10-11 RX ADMIN — LIDOCAINE HYDROCHLORIDE 15 ML: 20 SOLUTION ORAL; TOPICAL at 21:22

## 2019-10-11 RX ADMIN — CEFTRIAXONE SODIUM 250 MG: 250 INJECTION, POWDER, FOR SOLUTION INTRAMUSCULAR; INTRAVENOUS at 13:18

## 2019-10-11 RX ADMIN — PROMETHAZINE HYDROCHLORIDE 25 MG: 25 TABLET ORAL at 21:22

## 2019-10-11 RX ADMIN — KETOROLAC TROMETHAMINE 30 MG: 30 INJECTION, SOLUTION INTRAMUSCULAR at 13:17

## 2019-10-11 RX ADMIN — ONDANSETRON 4 MG: 2 INJECTION INTRAMUSCULAR; INTRAVENOUS at 13:17

## 2019-10-11 RX ADMIN — KETOROLAC TROMETHAMINE 30 MG: 30 INJECTION, SOLUTION INTRAMUSCULAR at 21:22

## 2019-10-11 RX ADMIN — PANTOPRAZOLE SODIUM 40 MG: 40 INJECTION, POWDER, FOR SOLUTION INTRAVENOUS at 13:16

## 2019-10-11 RX ADMIN — LIDOCAINE HYDROCHLORIDE 15 ML: 20 SOLUTION ORAL; TOPICAL at 13:16

## 2019-10-11 RX ADMIN — ACETAMINOPHEN 1000 MG: 500 TABLET ORAL at 14:22

## 2019-10-11 RX ADMIN — SODIUM CHLORIDE 1000 ML: 9 INJECTION, SOLUTION INTRAVENOUS at 13:17

## 2019-10-11 RX ADMIN — SODIUM CHLORIDE 10 ML: 9 INJECTION, SOLUTION INTRAMUSCULAR; INTRAVENOUS; SUBCUTANEOUS at 13:17

## 2019-10-11 RX ADMIN — ALUMINUM HYDROXIDE, MAGNESIUM HYDROXIDE, AND SIMETHICONE 30 ML: 200; 200; 20 SUSPENSION ORAL at 13:16

## 2019-10-11 ASSESSMENT — ENCOUNTER SYMPTOMS
COUGH: 1
EYE PAIN: 0
VOMITING: 1
DIARRHEA: 1
ABDOMINAL PAIN: 1
DIARRHEA: 1
WHEEZING: 1
ABDOMINAL PAIN: 1
EYE ITCHING: 0
BACK PAIN: 0
COUGH: 1
SORE THROAT: 0
VOMITING: 1
WHEEZING: 0
EYE PAIN: 0
NAUSEA: 1
NAUSEA: 1
EYE DISCHARGE: 0
SHORTNESS OF BREATH: 1
RHINORRHEA: 0
BLOOD IN STOOL: 1
SHORTNESS OF BREATH: 0
SORE THROAT: 0

## 2019-10-11 ASSESSMENT — PAIN SCALES - GENERAL
PAINLEVEL_OUTOF10: 10
PAINLEVEL_OUTOF10: 7
PAINLEVEL_OUTOF10: 10
PAINLEVEL_OUTOF10: 10
PAINLEVEL_OUTOF10: 9

## 2019-10-11 ASSESSMENT — PAIN DESCRIPTION - DESCRIPTORS
DESCRIPTORS: ACHING
DESCRIPTORS: THROBBING;SHARP

## 2019-10-11 ASSESSMENT — PAIN DESCRIPTION - FREQUENCY: FREQUENCY: CONTINUOUS

## 2019-10-11 ASSESSMENT — PAIN DESCRIPTION - PAIN TYPE
TYPE: ACUTE PAIN;CHRONIC PAIN
TYPE: ACUTE PAIN

## 2019-10-11 ASSESSMENT — PAIN DESCRIPTION - ONSET: ONSET: ON-GOING

## 2019-10-11 ASSESSMENT — PAIN DESCRIPTION - ORIENTATION: ORIENTATION: LEFT;LOWER

## 2019-10-11 ASSESSMENT — PAIN DESCRIPTION - LOCATION
LOCATION: ABDOMEN
LOCATION: RIB CAGE;ABDOMEN

## 2019-10-11 ASSESSMENT — PAIN DESCRIPTION - PROGRESSION: CLINICAL_PROGRESSION: GRADUALLY WORSENING

## 2019-10-17 ENCOUNTER — OFFICE VISIT (OUTPATIENT)
Dept: PRIMARY CARE CLINIC | Age: 29
End: 2019-10-17
Payer: MEDICARE

## 2019-10-17 VITALS
DIASTOLIC BLOOD PRESSURE: 72 MMHG | WEIGHT: 215 LBS | HEART RATE: 88 BPM | OXYGEN SATURATION: 96 % | TEMPERATURE: 98.1 F | BODY MASS INDEX: 32.69 KG/M2 | SYSTOLIC BLOOD PRESSURE: 131 MMHG

## 2019-10-17 DIAGNOSIS — R10.84 GENERALIZED ABDOMINAL PAIN: Primary | ICD-10-CM

## 2019-10-17 DIAGNOSIS — K92.1 BLOOD IN STOOL: ICD-10-CM

## 2019-10-17 PROCEDURE — 99213 OFFICE O/P EST LOW 20 MIN: CPT | Performed by: INTERNAL MEDICINE

## 2019-10-17 RX ORDER — TIZANIDINE 4 MG/1
4 TABLET ORAL 3 TIMES DAILY
Qty: 30 TABLET | Refills: 0 | Status: SHIPPED | OUTPATIENT
Start: 2019-10-17 | End: 2021-03-08 | Stop reason: SDUPTHER

## 2019-10-17 RX ORDER — PROMETHAZINE HYDROCHLORIDE 12.5 MG/1
12.5 TABLET ORAL 2 TIMES DAILY
Qty: 30 TABLET | Refills: 0 | Status: SHIPPED | OUTPATIENT
Start: 2019-10-17 | End: 2020-02-03 | Stop reason: SDUPTHER

## 2019-10-17 RX ORDER — AMOXICILLIN AND CLAVULANATE POTASSIUM 875; 125 MG/1; MG/1
TABLET, FILM COATED ORAL
COMMUNITY
End: 2021-01-26

## 2019-10-17 ASSESSMENT — ENCOUNTER SYMPTOMS
COUGH: 1
ABDOMINAL PAIN: 1
DIARRHEA: 1

## 2019-10-19 ENCOUNTER — APPOINTMENT (OUTPATIENT)
Dept: CT IMAGING | Age: 29
End: 2019-10-19
Payer: MEDICARE

## 2019-10-19 ENCOUNTER — HOSPITAL ENCOUNTER (EMERGENCY)
Age: 29
Discharge: HOME | End: 2019-10-19
Attending: EMERGENCY MEDICINE
Payer: MEDICARE

## 2019-10-19 ENCOUNTER — APPOINTMENT (OUTPATIENT)
Dept: GENERAL RADIOLOGY | Age: 29
End: 2019-10-19
Payer: MEDICARE

## 2019-10-19 VITALS
WEIGHT: 215 LBS | SYSTOLIC BLOOD PRESSURE: 123 MMHG | HEART RATE: 95 BPM | HEIGHT: 68 IN | TEMPERATURE: 97 F | DIASTOLIC BLOOD PRESSURE: 73 MMHG | BODY MASS INDEX: 32.58 KG/M2 | OXYGEN SATURATION: 96 % | RESPIRATION RATE: 18 BRPM

## 2019-10-19 DIAGNOSIS — K64.4 EXTERNAL HEMORRHOID: ICD-10-CM

## 2019-10-19 DIAGNOSIS — K59.00 CONSTIPATION, UNSPECIFIED CONSTIPATION TYPE: Primary | ICD-10-CM

## 2019-10-19 LAB
-: NORMAL
ABSOLUTE EOS #: 0.23 K/UL (ref 0–0.44)
ABSOLUTE IMMATURE GRANULOCYTE: 0.03 K/UL (ref 0–0.3)
ABSOLUTE LYMPH #: 3.11 K/UL (ref 1.1–3.7)
ABSOLUTE MONO #: 0.58 K/UL (ref 0.1–1.2)
ALBUMIN SERPL-MCNC: 3.8 G/DL (ref 3.5–5.2)
ALBUMIN/GLOBULIN RATIO: 1.3 (ref 1–2.5)
ALP BLD-CCNC: 62 U/L (ref 40–129)
ALT SERPL-CCNC: 92 U/L (ref 5–41)
ANION GAP SERPL CALCULATED.3IONS-SCNC: 8 MMOL/L (ref 9–17)
AST SERPL-CCNC: 66 U/L
BASOPHILS # BLD: 0 % (ref 0–2)
BASOPHILS ABSOLUTE: 0.03 K/UL (ref 0–0.2)
BILIRUB SERPL-MCNC: 0.62 MG/DL (ref 0.3–1.2)
BUN BLDV-MCNC: 19 MG/DL (ref 6–20)
BUN/CREAT BLD: ABNORMAL (ref 9–20)
CALCIUM SERPL-MCNC: 8.5 MG/DL (ref 8.6–10.4)
CHLORIDE BLD-SCNC: 100 MMOL/L (ref 98–107)
CO2: 29 MMOL/L (ref 20–31)
CREAT SERPL-MCNC: 0.78 MG/DL (ref 0.7–1.2)
DIFFERENTIAL TYPE: ABNORMAL
EOSINOPHILS RELATIVE PERCENT: 3 % (ref 1–4)
GFR AFRICAN AMERICAN: >60 ML/MIN
GFR NON-AFRICAN AMERICAN: >60 ML/MIN
GFR SERPL CREATININE-BSD FRML MDRD: ABNORMAL ML/MIN/{1.73_M2}
GFR SERPL CREATININE-BSD FRML MDRD: ABNORMAL ML/MIN/{1.73_M2}
GLUCOSE BLD-MCNC: 100 MG/DL (ref 70–99)
HCT VFR BLD CALC: 40.1 % (ref 40.7–50.3)
HEMOGLOBIN: 13 G/DL (ref 13–17)
IMMATURE GRANULOCYTES: 0 %
LIPASE: 18 U/L (ref 13–60)
LYMPHOCYTES # BLD: 42 % (ref 24–43)
MCH RBC QN AUTO: 29.3 PG (ref 25.2–33.5)
MCHC RBC AUTO-ENTMCNC: 32.4 G/DL (ref 28.4–34.8)
MCV RBC AUTO: 90.3 FL (ref 82.6–102.9)
MONOCYTES # BLD: 8 % (ref 3–12)
NRBC AUTOMATED: 0 PER 100 WBC
PDW BLD-RTO: 13.7 % (ref 11.8–14.4)
PLATELET # BLD: 235 K/UL (ref 138–453)
PLATELET ESTIMATE: ABNORMAL
PMV BLD AUTO: 10 FL (ref 8.1–13.5)
POTASSIUM SERPL-SCNC: 4.1 MMOL/L (ref 3.7–5.3)
RBC # BLD: 4.44 M/UL (ref 4.21–5.77)
RBC # BLD: ABNORMAL 10*6/UL
REASON FOR REJECTION: NORMAL
SEG NEUTROPHILS: 46 % (ref 36–65)
SEGMENTED NEUTROPHILS ABSOLUTE COUNT: 3.35 K/UL (ref 1.5–8.1)
SODIUM BLD-SCNC: 137 MMOL/L (ref 135–144)
TOTAL PROTEIN: 6.8 G/DL (ref 6.4–8.3)
WBC # BLD: 7.3 K/UL (ref 3.5–11.3)
WBC # BLD: ABNORMAL 10*3/UL
ZZ NTE CLEAN UP: ORDERED TEST: NORMAL
ZZ NTE WITH NAME CLEAN UP: SPECIMEN SOURCE: NORMAL

## 2019-10-19 PROCEDURE — 2580000003 HC RX 258: Performed by: EMERGENCY MEDICINE

## 2019-10-19 PROCEDURE — 74176 CT ABD & PELVIS W/O CONTRAST: CPT

## 2019-10-19 PROCEDURE — 6370000000 HC RX 637 (ALT 250 FOR IP): Performed by: EMERGENCY MEDICINE

## 2019-10-19 PROCEDURE — 83690 ASSAY OF LIPASE: CPT

## 2019-10-19 PROCEDURE — 99284 EMERGENCY DEPT VISIT MOD MDM: CPT

## 2019-10-19 PROCEDURE — 96374 THER/PROPH/DIAG INJ IV PUSH: CPT

## 2019-10-19 PROCEDURE — 85025 COMPLETE CBC W/AUTO DIFF WBC: CPT

## 2019-10-19 PROCEDURE — 80053 COMPREHEN METABOLIC PANEL: CPT

## 2019-10-19 PROCEDURE — 96375 TX/PRO/DX INJ NEW DRUG ADDON: CPT

## 2019-10-19 PROCEDURE — 6360000002 HC RX W HCPCS: Performed by: EMERGENCY MEDICINE

## 2019-10-19 RX ORDER — KETOROLAC TROMETHAMINE 15 MG/ML
15 INJECTION, SOLUTION INTRAMUSCULAR; INTRAVENOUS ONCE
Status: COMPLETED | OUTPATIENT
Start: 2019-10-19 | End: 2019-10-19

## 2019-10-19 RX ORDER — ONDANSETRON 2 MG/ML
4 INJECTION INTRAMUSCULAR; INTRAVENOUS ONCE
Status: COMPLETED | OUTPATIENT
Start: 2019-10-19 | End: 2019-10-19

## 2019-10-19 RX ORDER — MAGNESIUM HYDROXIDE/ALUMINUM HYDROXICE/SIMETHICONE 120; 1200; 1200 MG/30ML; MG/30ML; MG/30ML
30 SUSPENSION ORAL
Status: COMPLETED | OUTPATIENT
Start: 2019-10-19 | End: 2019-10-19

## 2019-10-19 RX ORDER — DOCUSATE SODIUM 100 MG/1
100 CAPSULE, LIQUID FILLED ORAL 2 TIMES DAILY
Qty: 20 CAPSULE | Refills: 0 | Status: SHIPPED | OUTPATIENT
Start: 2019-10-19 | End: 2019-12-23 | Stop reason: SDUPTHER

## 2019-10-19 RX ORDER — DIPHENHYDRAMINE HYDROCHLORIDE 50 MG/ML
25 INJECTION INTRAMUSCULAR; INTRAVENOUS ONCE
Status: COMPLETED | OUTPATIENT
Start: 2019-10-19 | End: 2019-10-19

## 2019-10-19 RX ORDER — 0.9 % SODIUM CHLORIDE 0.9 %
1000 INTRAVENOUS SOLUTION INTRAVENOUS ONCE
Status: COMPLETED | OUTPATIENT
Start: 2019-10-19 | End: 2019-10-19

## 2019-10-19 RX ORDER — LIDOCAINE HYDROCHLORIDE 20 MG/ML
15 SOLUTION OROPHARYNGEAL ONCE
Status: COMPLETED | OUTPATIENT
Start: 2019-10-19 | End: 2019-10-19

## 2019-10-19 RX ADMIN — KETOROLAC TROMETHAMINE 15 MG: 15 INJECTION, SOLUTION INTRAMUSCULAR; INTRAVENOUS at 04:19

## 2019-10-19 RX ADMIN — MAGNESIUM HYDROXIDE 30 ML: 400 SUSPENSION ORAL at 04:19

## 2019-10-19 RX ADMIN — GLYCERIN 2 G: 2.1 SUPPOSITORY RECTAL at 04:34

## 2019-10-19 RX ADMIN — DIPHENHYDRAMINE HYDROCHLORIDE 25 MG: 50 INJECTION, SOLUTION INTRAMUSCULAR; INTRAVENOUS at 03:16

## 2019-10-19 RX ADMIN — ONDANSETRON 4 MG: 2 INJECTION INTRAMUSCULAR; INTRAVENOUS at 03:16

## 2019-10-19 RX ADMIN — ALUMINUM HYDROXIDE, MAGNESIUM HYDROXIDE, AND SIMETHICONE 30 ML: 200; 200; 20 SUSPENSION ORAL at 03:54

## 2019-10-19 RX ADMIN — SODIUM CHLORIDE 1000 ML: 9 INJECTION, SOLUTION INTRAVENOUS at 03:17

## 2019-10-19 RX ADMIN — LIDOCAINE HYDROCHLORIDE 15 ML: 20 SOLUTION ORAL; TOPICAL at 03:54

## 2019-10-19 ASSESSMENT — ENCOUNTER SYMPTOMS
BACK PAIN: 0
SHORTNESS OF BREATH: 0
NAUSEA: 1
RECTAL PAIN: 1
CHEST TIGHTNESS: 0
DIARRHEA: 0
ABDOMINAL PAIN: 1
VOMITING: 1
BLOOD IN STOOL: 1

## 2019-10-19 ASSESSMENT — PAIN DESCRIPTION - ORIENTATION: ORIENTATION: RIGHT;UPPER

## 2019-10-19 ASSESSMENT — PAIN DESCRIPTION - ONSET: ONSET: PROGRESSIVE

## 2019-10-19 ASSESSMENT — PAIN DESCRIPTION - PROGRESSION: CLINICAL_PROGRESSION: GRADUALLY WORSENING

## 2019-10-19 ASSESSMENT — PAIN SCALES - GENERAL
PAINLEVEL_OUTOF10: 9
PAINLEVEL_OUTOF10: 9

## 2019-10-19 ASSESSMENT — PAIN DESCRIPTION - LOCATION: LOCATION: ABDOMEN

## 2019-10-19 ASSESSMENT — PAIN DESCRIPTION - PAIN TYPE: TYPE: ACUTE PAIN;CHRONIC PAIN

## 2019-10-22 ENCOUNTER — HOSPITAL ENCOUNTER (OUTPATIENT)
Age: 29
Setting detail: SPECIMEN
Discharge: HOME | End: 2019-10-22
Payer: MEDICARE

## 2019-10-22 LAB
ABSOLUTE EOS #: 0.34 K/UL (ref 0–0.44)
ABSOLUTE IMMATURE GRANULOCYTE: <0.03 K/UL (ref 0–0.3)
ABSOLUTE LYMPH #: 1.87 K/UL (ref 1.1–3.7)
ABSOLUTE MONO #: 0.55 K/UL (ref 0.1–1.2)
ALBUMIN SERPL-MCNC: 3.6 G/DL (ref 3.5–5.2)
ALBUMIN/GLOBULIN RATIO: 1.1 (ref 1–2.5)
ALP BLD-CCNC: 63 U/L (ref 40–129)
ALT SERPL-CCNC: 43 U/L (ref 5–41)
ANION GAP SERPL CALCULATED.3IONS-SCNC: 11 MMOL/L (ref 9–17)
AST SERPL-CCNC: 19 U/L
BASOPHILS # BLD: 0 % (ref 0–2)
BASOPHILS ABSOLUTE: <0.03 K/UL (ref 0–0.2)
BILIRUB SERPL-MCNC: <0.1 MG/DL (ref 0.3–1.2)
BUN BLDV-MCNC: 16 MG/DL (ref 6–20)
BUN/CREAT BLD: ABNORMAL (ref 9–20)
CALCIUM SERPL-MCNC: 8.7 MG/DL (ref 8.6–10.4)
CHLORIDE BLD-SCNC: 104 MMOL/L (ref 98–107)
CO2: 24 MMOL/L (ref 20–31)
CREAT SERPL-MCNC: 0.7 MG/DL (ref 0.7–1.2)
DIFFERENTIAL TYPE: ABNORMAL
EOSINOPHILS RELATIVE PERCENT: 4 % (ref 1–4)
GFR AFRICAN AMERICAN: >60 ML/MIN
GFR NON-AFRICAN AMERICAN: >60 ML/MIN
GFR SERPL CREATININE-BSD FRML MDRD: ABNORMAL ML/MIN/{1.73_M2}
GFR SERPL CREATININE-BSD FRML MDRD: ABNORMAL ML/MIN/{1.73_M2}
GLUCOSE BLD-MCNC: 110 MG/DL (ref 70–99)
HAV IGM SER IA-ACNC: NONREACTIVE
HCT VFR BLD CALC: 44.5 % (ref 40.7–50.3)
HEMOGLOBIN: 14.2 G/DL (ref 13–17)
HEPATITIS B CORE IGM ANTIBODY: NONREACTIVE
HEPATITIS B SURFACE ANTIGEN: NONREACTIVE
HEPATITIS C ANTIBODY: NONREACTIVE
HIV AG/AB: NONREACTIVE
IMMATURE GRANULOCYTES: 0 %
LYMPHOCYTES # BLD: 21 % (ref 24–43)
MCH RBC QN AUTO: 28.9 PG (ref 25.2–33.5)
MCHC RBC AUTO-ENTMCNC: 31.9 G/DL (ref 28.4–34.8)
MCV RBC AUTO: 90.4 FL (ref 82.6–102.9)
MONOCYTES # BLD: 6 % (ref 3–12)
NRBC AUTOMATED: 0 PER 100 WBC
PDW BLD-RTO: 13.7 % (ref 11.8–14.4)
PLATELET # BLD: 261 K/UL (ref 138–453)
PLATELET ESTIMATE: ABNORMAL
PMV BLD AUTO: 10.7 FL (ref 8.1–13.5)
POTASSIUM SERPL-SCNC: 4 MMOL/L (ref 3.7–5.3)
RBC # BLD: 4.92 M/UL (ref 4.21–5.77)
RBC # BLD: ABNORMAL 10*6/UL
SEG NEUTROPHILS: 69 % (ref 36–65)
SEGMENTED NEUTROPHILS ABSOLUTE COUNT: 6.01 K/UL (ref 1.5–8.1)
SODIUM BLD-SCNC: 139 MMOL/L (ref 135–144)
TOTAL PROTEIN: 7 G/DL (ref 6.4–8.3)
WBC # BLD: 8.8 K/UL (ref 3.5–11.3)
WBC # BLD: ABNORMAL 10*3/UL

## 2019-10-24 LAB
QUANTI TB GOLD PLUS: NEGATIVE
QUANTI TB1 MINUS NIL: 0.12 IU/ML (ref 0–0.34)
QUANTI TB2 MINUS NIL: 0.14 IU/ML (ref 0–0.34)
QUANTIFERON MITOGEN: >10 IU/ML
QUANTIFERON NIL: 0.05 IU/ML

## 2019-12-23 ENCOUNTER — APPOINTMENT (OUTPATIENT)
Dept: GENERAL RADIOLOGY | Age: 29
End: 2019-12-23
Payer: MEDICARE

## 2019-12-23 ENCOUNTER — APPOINTMENT (OUTPATIENT)
Dept: CT IMAGING | Age: 29
End: 2019-12-23
Payer: MEDICARE

## 2019-12-23 ENCOUNTER — HOSPITAL ENCOUNTER (EMERGENCY)
Age: 29
Discharge: HOME | End: 2019-12-24
Attending: EMERGENCY MEDICINE
Payer: MEDICARE

## 2019-12-23 VITALS
DIASTOLIC BLOOD PRESSURE: 72 MMHG | WEIGHT: 220 LBS | RESPIRATION RATE: 18 BRPM | HEART RATE: 82 BPM | OXYGEN SATURATION: 97 % | SYSTOLIC BLOOD PRESSURE: 116 MMHG | TEMPERATURE: 97.9 F | HEIGHT: 69 IN | BODY MASS INDEX: 32.58 KG/M2

## 2019-12-23 DIAGNOSIS — K59.00 CONSTIPATION, UNSPECIFIED CONSTIPATION TYPE: Primary | ICD-10-CM

## 2019-12-23 LAB
-: NORMAL
ABSOLUTE EOS #: 0.14 K/UL (ref 0–0.44)
ABSOLUTE IMMATURE GRANULOCYTE: <0.03 K/UL (ref 0–0.3)
ABSOLUTE LYMPH #: 2.67 K/UL (ref 1.1–3.7)
ABSOLUTE MONO #: 0.52 K/UL (ref 0.1–1.2)
ALBUMIN SERPL-MCNC: 4 G/DL (ref 3.5–5.2)
ALBUMIN/GLOBULIN RATIO: 1.5 (ref 1–2.5)
ALP BLD-CCNC: 58 U/L (ref 40–129)
ALT SERPL-CCNC: 10 U/L (ref 5–41)
ANION GAP SERPL CALCULATED.3IONS-SCNC: 10 MMOL/L (ref 9–17)
AST SERPL-CCNC: 15 U/L
BASOPHILS # BLD: 1 % (ref 0–2)
BASOPHILS ABSOLUTE: 0.04 K/UL (ref 0–0.2)
BILIRUB SERPL-MCNC: <0.1 MG/DL (ref 0.3–1.2)
BUN BLDV-MCNC: 12 MG/DL (ref 6–20)
BUN/CREAT BLD: ABNORMAL (ref 9–20)
CALCIUM SERPL-MCNC: 8.6 MG/DL (ref 8.6–10.4)
CHLORIDE BLD-SCNC: 100 MMOL/L (ref 98–107)
CO2: 25 MMOL/L (ref 20–31)
CREAT SERPL-MCNC: 0.93 MG/DL (ref 0.7–1.2)
DIFFERENTIAL TYPE: NORMAL
EOSINOPHILS RELATIVE PERCENT: 2 % (ref 1–4)
GFR AFRICAN AMERICAN: >60 ML/MIN
GFR NON-AFRICAN AMERICAN: >60 ML/MIN
GFR SERPL CREATININE-BSD FRML MDRD: ABNORMAL ML/MIN/{1.73_M2}
GFR SERPL CREATININE-BSD FRML MDRD: ABNORMAL ML/MIN/{1.73_M2}
GLUCOSE BLD-MCNC: 87 MG/DL (ref 70–99)
HCT VFR BLD CALC: 42.7 % (ref 40.7–50.3)
HEMOGLOBIN: 14 G/DL (ref 13–17)
IMMATURE GRANULOCYTES: 0 %
LIPASE: 31 U/L (ref 13–60)
LYMPHOCYTES # BLD: 36 % (ref 24–43)
MCH RBC QN AUTO: 29.5 PG (ref 25.2–33.5)
MCHC RBC AUTO-ENTMCNC: 32.8 G/DL (ref 28.4–34.8)
MCV RBC AUTO: 89.9 FL (ref 82.6–102.9)
MONOCYTES # BLD: 7 % (ref 3–12)
NRBC AUTOMATED: 0 PER 100 WBC
PDW BLD-RTO: 13.2 % (ref 11.8–14.4)
PLATELET # BLD: 280 K/UL (ref 138–453)
PLATELET ESTIMATE: NORMAL
PMV BLD AUTO: 10 FL (ref 8.1–13.5)
POTASSIUM SERPL-SCNC: 4.2 MMOL/L (ref 3.7–5.3)
RBC # BLD: 4.75 M/UL (ref 4.21–5.77)
RBC # BLD: NORMAL 10*6/UL
REASON FOR REJECTION: NORMAL
SEG NEUTROPHILS: 54 % (ref 36–65)
SEGMENTED NEUTROPHILS ABSOLUTE COUNT: 3.95 K/UL (ref 1.5–8.1)
SODIUM BLD-SCNC: 135 MMOL/L (ref 135–144)
TOTAL PROTEIN: 6.7 G/DL (ref 6.4–8.3)
WBC # BLD: 7.3 K/UL (ref 3.5–11.3)
WBC # BLD: NORMAL 10*3/UL
ZZ NTE CLEAN UP: ORDERED TEST: NORMAL
ZZ NTE WITH NAME CLEAN UP: SPECIMEN SOURCE: NORMAL

## 2019-12-23 PROCEDURE — 99284 EMERGENCY DEPT VISIT MOD MDM: CPT

## 2019-12-23 PROCEDURE — 6360000002 HC RX W HCPCS: Performed by: STUDENT IN AN ORGANIZED HEALTH CARE EDUCATION/TRAINING PROGRAM

## 2019-12-23 PROCEDURE — 93005 ELECTROCARDIOGRAM TRACING: CPT | Performed by: STUDENT IN AN ORGANIZED HEALTH CARE EDUCATION/TRAINING PROGRAM

## 2019-12-23 PROCEDURE — 80053 COMPREHEN METABOLIC PANEL: CPT

## 2019-12-23 PROCEDURE — 96374 THER/PROPH/DIAG INJ IV PUSH: CPT

## 2019-12-23 PROCEDURE — 83690 ASSAY OF LIPASE: CPT

## 2019-12-23 PROCEDURE — 6370000000 HC RX 637 (ALT 250 FOR IP): Performed by: STUDENT IN AN ORGANIZED HEALTH CARE EDUCATION/TRAINING PROGRAM

## 2019-12-23 PROCEDURE — 85025 COMPLETE CBC W/AUTO DIFF WBC: CPT

## 2019-12-23 PROCEDURE — 71046 X-RAY EXAM CHEST 2 VIEWS: CPT

## 2019-12-23 PROCEDURE — 74176 CT ABD & PELVIS W/O CONTRAST: CPT

## 2019-12-23 RX ORDER — ACETAMINOPHEN 325 MG/1
650 TABLET ORAL ONCE
Status: COMPLETED | OUTPATIENT
Start: 2019-12-23 | End: 2019-12-23

## 2019-12-23 RX ORDER — CETIRIZINE HYDROCHLORIDE 10 MG/1
10 TABLET ORAL ONCE
Status: COMPLETED | OUTPATIENT
Start: 2019-12-23 | End: 2019-12-24

## 2019-12-23 RX ORDER — POLYETHYLENE GLYCOL 3350 17 G/17G
17 POWDER, FOR SOLUTION ORAL DAILY
Qty: 17 G | Refills: 0 | Status: SHIPPED | OUTPATIENT
Start: 2019-12-23 | End: 2019-12-24

## 2019-12-23 RX ORDER — ONDANSETRON 2 MG/ML
4 INJECTION INTRAMUSCULAR; INTRAVENOUS ONCE
Status: COMPLETED | OUTPATIENT
Start: 2019-12-23 | End: 2019-12-23

## 2019-12-23 RX ORDER — DOCUSATE SODIUM 100 MG/1
100 CAPSULE, LIQUID FILLED ORAL 2 TIMES DAILY
Qty: 20 CAPSULE | Refills: 0 | Status: SHIPPED | OUTPATIENT
Start: 2019-12-23 | End: 2021-01-26

## 2019-12-23 RX ADMIN — ACETAMINOPHEN 650 MG: 325 TABLET ORAL at 20:33

## 2019-12-23 RX ADMIN — ONDANSETRON 4 MG: 2 INJECTION INTRAMUSCULAR; INTRAVENOUS at 20:54

## 2019-12-23 RX ADMIN — MAGNESIUM CITRATE 296 ML: 1.75 LIQUID ORAL at 22:30

## 2019-12-23 ASSESSMENT — PAIN DESCRIPTION - FREQUENCY: FREQUENCY: INTERMITTENT

## 2019-12-23 ASSESSMENT — PAIN DESCRIPTION - LOCATION: LOCATION: ABDOMEN

## 2019-12-23 ASSESSMENT — PAIN DESCRIPTION - ORIENTATION: ORIENTATION: LEFT;LOWER

## 2019-12-23 ASSESSMENT — ENCOUNTER SYMPTOMS
SORE THROAT: 0
SHORTNESS OF BREATH: 1
RHINORRHEA: 0
COUGH: 1
EYE ITCHING: 0
VOMITING: 0
NAUSEA: 1
BLOOD IN STOOL: 1
BACK PAIN: 1
EYE REDNESS: 0
ABDOMINAL PAIN: 1

## 2019-12-23 ASSESSMENT — PAIN SCALES - GENERAL
PAINLEVEL_OUTOF10: 9
PAINLEVEL_OUTOF10: 9

## 2019-12-23 ASSESSMENT — PAIN DESCRIPTION - DESCRIPTORS: DESCRIPTORS: SHARP

## 2019-12-24 LAB
EKG ATRIAL RATE: 77 BPM
EKG P AXIS: 46 DEGREES
EKG P-R INTERVAL: 146 MS
EKG Q-T INTERVAL: 366 MS
EKG QRS DURATION: 86 MS
EKG QTC CALCULATION (BAZETT): 414 MS
EKG R AXIS: 53 DEGREES
EKG T AXIS: 44 DEGREES
EKG VENTRICULAR RATE: 77 BPM

## 2019-12-24 PROCEDURE — 93010 ELECTROCARDIOGRAM REPORT: CPT | Performed by: INTERNAL MEDICINE

## 2019-12-24 PROCEDURE — 6370000000 HC RX 637 (ALT 250 FOR IP): Performed by: STUDENT IN AN ORGANIZED HEALTH CARE EDUCATION/TRAINING PROGRAM

## 2019-12-24 RX ADMIN — CETIRIZINE HYDROCHLORIDE 10 MG: 10 TABLET ORAL at 00:00

## 2020-01-09 ENCOUNTER — HOSPITAL ENCOUNTER (EMERGENCY)
Age: 30
Discharge: HOME | End: 2020-01-09
Attending: EMERGENCY MEDICINE
Payer: MEDICARE

## 2020-01-09 VITALS
HEIGHT: 69 IN | OXYGEN SATURATION: 93 % | TEMPERATURE: 97.5 F | DIASTOLIC BLOOD PRESSURE: 86 MMHG | HEART RATE: 99 BPM | RESPIRATION RATE: 9 BRPM | SYSTOLIC BLOOD PRESSURE: 142 MMHG | WEIGHT: 225 LBS | BODY MASS INDEX: 33.33 KG/M2

## 2020-01-09 LAB
ABSOLUTE EOS #: 0.04 K/UL (ref 0–0.44)
ABSOLUTE IMMATURE GRANULOCYTE: 0.25 K/UL (ref 0–0.3)
ABSOLUTE LYMPH #: 3.02 K/UL (ref 1.1–3.7)
ABSOLUTE MONO #: 0.67 K/UL (ref 0.1–1.2)
ACETAMINOPHEN LEVEL: <5 UG/ML (ref 10–30)
ALBUMIN SERPL-MCNC: 4 G/DL (ref 3.5–5.2)
ALBUMIN/GLOBULIN RATIO: 1.5 (ref 1–2.5)
ALP BLD-CCNC: 44 U/L (ref 40–129)
ALT SERPL-CCNC: 20 U/L (ref 5–41)
ANION GAP SERPL CALCULATED.3IONS-SCNC: 12 MMOL/L (ref 9–17)
AST SERPL-CCNC: 26 U/L
BASOPHILS # BLD: 0 % (ref 0–2)
BASOPHILS ABSOLUTE: 0.03 K/UL (ref 0–0.2)
BILIRUB SERPL-MCNC: <0.1 MG/DL (ref 0.3–1.2)
BUN BLDV-MCNC: 20 MG/DL (ref 6–20)
BUN/CREAT BLD: ABNORMAL (ref 9–20)
CALCIUM SERPL-MCNC: 8.6 MG/DL (ref 8.6–10.4)
CHLORIDE BLD-SCNC: 98 MMOL/L (ref 98–107)
CO2: 28 MMOL/L (ref 20–31)
CREAT SERPL-MCNC: 0.73 MG/DL (ref 0.7–1.2)
DIFFERENTIAL TYPE: ABNORMAL
EOSINOPHILS RELATIVE PERCENT: 0 % (ref 1–4)
ETHANOL PERCENT: <0.01 %
ETHANOL: <10 MG/DL
GFR AFRICAN AMERICAN: >60 ML/MIN
GFR NON-AFRICAN AMERICAN: >60 ML/MIN
GFR SERPL CREATININE-BSD FRML MDRD: ABNORMAL ML/MIN/{1.73_M2}
GFR SERPL CREATININE-BSD FRML MDRD: ABNORMAL ML/MIN/{1.73_M2}
GLUCOSE BLD-MCNC: 109 MG/DL (ref 70–99)
HCT VFR BLD CALC: 37.6 % (ref 40.7–50.3)
HEMOGLOBIN: 12.1 G/DL (ref 13–17)
IMMATURE GRANULOCYTES: 2 %
LIPASE: 16 U/L (ref 13–60)
LYMPHOCYTES # BLD: 27 % (ref 24–43)
MCH RBC QN AUTO: 30 PG (ref 25.2–33.5)
MCHC RBC AUTO-ENTMCNC: 32.2 G/DL (ref 28.4–34.8)
MCV RBC AUTO: 93.3 FL (ref 82.6–102.9)
MONOCYTES # BLD: 6 % (ref 3–12)
NRBC AUTOMATED: 0.2 PER 100 WBC
PDW BLD-RTO: 13.9 % (ref 11.8–14.4)
PLATELET # BLD: 236 K/UL (ref 138–453)
PLATELET ESTIMATE: ABNORMAL
PMV BLD AUTO: 10 FL (ref 8.1–13.5)
POTASSIUM SERPL-SCNC: 4 MMOL/L (ref 3.7–5.3)
RBC # BLD: 4.03 M/UL (ref 4.21–5.77)
RBC # BLD: ABNORMAL 10*6/UL
SALICYLATE LEVEL: <1 MG/DL (ref 3–10)
SEG NEUTROPHILS: 65 % (ref 36–65)
SEGMENTED NEUTROPHILS ABSOLUTE COUNT: 7.18 K/UL (ref 1.5–8.1)
SODIUM BLD-SCNC: 138 MMOL/L (ref 135–144)
TOTAL PROTEIN: 6.7 G/DL (ref 6.4–8.3)
TOXIC TRICYCLIC SC,BLOOD: NEGATIVE
WBC # BLD: 11.2 K/UL (ref 3.5–11.3)
WBC # BLD: ABNORMAL 10*3/UL

## 2020-01-09 PROCEDURE — 93005 ELECTROCARDIOGRAM TRACING: CPT | Performed by: STUDENT IN AN ORGANIZED HEALTH CARE EDUCATION/TRAINING PROGRAM

## 2020-01-09 PROCEDURE — 96374 THER/PROPH/DIAG INJ IV PUSH: CPT

## 2020-01-09 PROCEDURE — G0480 DRUG TEST DEF 1-7 CLASSES: HCPCS

## 2020-01-09 PROCEDURE — 6370000000 HC RX 637 (ALT 250 FOR IP): Performed by: STUDENT IN AN ORGANIZED HEALTH CARE EDUCATION/TRAINING PROGRAM

## 2020-01-09 PROCEDURE — 80053 COMPREHEN METABOLIC PANEL: CPT

## 2020-01-09 PROCEDURE — 80307 DRUG TEST PRSMV CHEM ANLYZR: CPT

## 2020-01-09 PROCEDURE — 85025 COMPLETE CBC W/AUTO DIFF WBC: CPT

## 2020-01-09 PROCEDURE — 6370000000 HC RX 637 (ALT 250 FOR IP): Performed by: EMERGENCY MEDICINE

## 2020-01-09 PROCEDURE — 83690 ASSAY OF LIPASE: CPT

## 2020-01-09 PROCEDURE — 6360000002 HC RX W HCPCS: Performed by: STUDENT IN AN ORGANIZED HEALTH CARE EDUCATION/TRAINING PROGRAM

## 2020-01-09 PROCEDURE — 99284 EMERGENCY DEPT VISIT MOD MDM: CPT

## 2020-01-09 RX ORDER — HYDROXYZINE HYDROCHLORIDE 10 MG/1
10 TABLET, FILM COATED ORAL ONCE
Status: COMPLETED | OUTPATIENT
Start: 2020-01-09 | End: 2020-01-09

## 2020-01-09 RX ORDER — KETOROLAC TROMETHAMINE 15 MG/ML
15 INJECTION, SOLUTION INTRAMUSCULAR; INTRAVENOUS ONCE
Status: COMPLETED | OUTPATIENT
Start: 2020-01-09 | End: 2020-01-09

## 2020-01-09 RX ORDER — ALBUTEROL SULFATE 90 UG/1
2 AEROSOL, METERED RESPIRATORY (INHALATION)
Status: DISCONTINUED | OUTPATIENT
Start: 2020-01-09 | End: 2020-01-09 | Stop reason: HOSPADM

## 2020-01-09 RX ORDER — IPRATROPIUM BROMIDE AND ALBUTEROL SULFATE 2.5; .5 MG/3ML; MG/3ML
1 SOLUTION RESPIRATORY (INHALATION)
Status: DISCONTINUED | OUTPATIENT
Start: 2020-01-09 | End: 2020-01-09 | Stop reason: HOSPADM

## 2020-01-09 RX ORDER — ACETAMINOPHEN 325 MG/1
650 TABLET ORAL ONCE
Status: COMPLETED | OUTPATIENT
Start: 2020-01-09 | End: 2020-01-09

## 2020-01-09 RX ORDER — ALBUTEROL SULFATE 2.5 MG/3ML
5 SOLUTION RESPIRATORY (INHALATION)
Status: DISCONTINUED | OUTPATIENT
Start: 2020-01-09 | End: 2020-01-09 | Stop reason: HOSPADM

## 2020-01-09 RX ORDER — CETIRIZINE HYDROCHLORIDE 5 MG/1
5 TABLET ORAL ONCE
Status: COMPLETED | OUTPATIENT
Start: 2020-01-09 | End: 2020-01-09

## 2020-01-09 RX ADMIN — KETOROLAC TROMETHAMINE 15 MG: 15 INJECTION, SOLUTION INTRAMUSCULAR; INTRAVENOUS at 15:08

## 2020-01-09 RX ADMIN — HYDROXYZINE HYDROCHLORIDE 10 MG: 10 TABLET, FILM COATED ORAL at 19:13

## 2020-01-09 RX ADMIN — CETIRIZINE HYDROCHLORIDE 5 MG: 5 SOLUTION ORAL at 14:43

## 2020-01-09 RX ADMIN — ACETAMINOPHEN 650 MG: 325 TABLET ORAL at 18:05

## 2020-01-09 ASSESSMENT — PAIN SCALES - GENERAL
PAINLEVEL_OUTOF10: 10
PAINLEVEL_OUTOF10: 8
PAINLEVEL_OUTOF10: 8

## 2020-01-09 ASSESSMENT — ENCOUNTER SYMPTOMS
SORE THROAT: 1
DIARRHEA: 1
EYE ITCHING: 0
SHORTNESS OF BREATH: 1
COUGH: 1
BLOOD IN STOOL: 1
RHINORRHEA: 0
EYE REDNESS: 0
VOMITING: 1
ABDOMINAL PAIN: 1
NAUSEA: 1

## 2020-01-09 ASSESSMENT — PAIN DESCRIPTION - LOCATION: LOCATION: ABDOMEN

## 2020-01-09 ASSESSMENT — PAIN DESCRIPTION - PAIN TYPE: TYPE: ACUTE PAIN

## 2020-01-09 NOTE — ED PROVIDER NOTES
[facility]  ED  Emergency Department  Emergency Medicine Resident Sign-out     Care of Cynthia Carlos was assumed from Dr. Lyndsay Núñez and is being seen for Other (sent for medical clearence from [facility] due to sleepiness, admits to taking seroquel this morning at 0900); Rash (rash all over for 4-5 days); and Abdominal Pain (N/V/D and diffuse pain)  . The patient's initial evaluation and plan have been discussed with the prior provider who initially evaluated the patient.      EMERGENCY DEPARTMENT COURSE / MEDICAL DECISION MAKING:       MEDICATIONS GIVEN:  Orders Placed This Encounter   Medications    cetirizine HCl (ZYRTEC) 5 MG/5ML solution 5 mg    ketorolac (TORADOL) injection 15 mg    acetaminophen (TYLENOL) tablet 650 mg    DISCONTD: albuterol (PROVENTIL) nebulizer solution 5 mg    DISCONTD: ipratropium-albuterol (DUONEB) nebulizer solution 1 ampule    DISCONTD: albuterol (PROVENTIL) nebulizer solution 5 mg    DISCONTD: albuterol sulfate  (90 Base) MCG/ACT inhaler 2 puff    DISCONTD: albuterol sulfate  (90 Base) MCG/ACT inhaler 2 puff    DISCONTD: ipratropium (ATROVENT HFA) 17 MCG/ACT inhaler 2 puff    DISCONTD: ipratropium (ATROVENT) 0.02 % nebulizer solution 0.5 mg    hydrOXYzine (ATARAX) tablet 10 mg       LABS / RADIOLOGY:     Labs Reviewed   CBC WITH AUTO DIFFERENTIAL - Abnormal; Notable for the following components:       Result Value    RBC 4.03 (*)     Hemoglobin 12.1 (*)     Hematocrit 37.6 (*)     NRBC Automated 0.2 (*)     Eosinophils % 0 (*)     Immature Granulocytes 2 (*)     All other components within normal limits   COMPREHENSIVE METABOLIC PANEL - Abnormal; Notable for the following components:    Glucose 109 (*)     Total Bilirubin <0.10 (*)     All other components within normal limits   TOX SCR, BLD, ED - Abnormal; Notable for the following components:    Salicylate Lvl <1 (*)     Acetaminophen Level <5 (*)     All other components within normal limits

## 2020-01-09 NOTE — ED PROVIDER NOTES
regular rate and rhythm, abdomen is soft minimal tenderness nontender nondistended    Impression: Seroquel overdose    Plan: EKG, CBC, BMP, tox screen, poison control consultation      EKG Interpretation    Interpreted by me  Normal sinus rhythm with ventricular rate of 100, normal MD interval, normal QRS duration, normal axis, nonspecific ST and T wave abnormality, normal QT corrected      CRITICAL CARE: There was a high probability of clinically significant/life threatening deterioration in this patient's condition which required my urgent intervention. Total critical care time was 5 minutes. This excludes any time for separately reportable procedures. (Please note that portions of this note were completed with a voice recognition program.  Efforts were made to edit the dictations but occasionally words are mis-transcribed.)    Vince Barron.  Kenia Farley MD, [facility]  Attending Emergency Medicine Physician        Josef Rosales MD  01/09/20 8665

## 2020-01-09 NOTE — ED PROVIDER NOTES
[facility]  Emergency Department Encounter  Emergency Medicine Resident     Pt Name: Cynthia Carlos  MRN: [id number]  [place] 1990  Date ofevaluation: 1/9/20  PCP:  No primary care provider on file. CHIEF COMPLAINT       Chief Complaint   Patient presents with    Other     sent for medical clearence from [facility] due to sleepiness, admits to taking seroquel this morning at 0900    Rash     rash all over for 4-5 days    Abdominal Pain     N/V/D and diffuse pain     HISTORY OF PRESENT ILLNESS  (Location/Symptom, Timing/Onset, Context/Setting, Quality, Duration, Modifying Factors, Severity, Associated signs/symptoms)     Cynthia Carlos is a 34 y.o. male who presents with excessive somnolence. Patient presents for medical clearance after he was found to [facility] to be excessively somnolent after he took several of his Seroquel. Patient reports he took an extra dose. After reviewing his chart including number of pills left after her most recent prescription filled, patient could have possibly gotten to approximately 16 tablets of 25 mg of Seroquel. He states he took 600 mg of Seroquel in addition to 1200 mg of Neurontin this about approximately 8:00 this morning as an accidental ingestion to relieve his chronic joint pain. .  Denies any suicidal ideation, or attempts at self-harm. He was sent in by the nurse because he was excessively somnolent whether talking with him. Here he complains of numerous other symptoms including a rash across his chest, subjective fevers and chills, sore throat, abdominal pain, nausea vomiting, diarrhea, weakness in his feet, and some dizziness. He is been seen numerous times in return for abdominal pain in the past without significant work-up noted. Denies any history of abdominal surgeries.     PAST MEDICAL / SURGICAL / SOCIAL / FAMILY HISTORY      has a past medical history of Anxiety, Arthritis, Asthma, Bipolar I disorder, most recent episode (or current) depressed, unspecified, Clostridium difficile infection, COPD (chronic obstructive pulmonary disease) ([facility] [address] Depression, Disease of blood and blood forming organ, Eczema, Fracture, metacarpal, Gastric ulcer, Gastritis, GERD (gastroesophageal reflux disease), GI bleed, H. pylori infection, H/O blood clots, Head injury, Headache, Insomnia, Juvenile rheumatoid arthritis ([facility] [address] Neuromuscular disorder (HCC), PFAPA syndrome ([facility] [address] PUD (peptic ulcer disease), Rheumatoid arthritis ([facility] [address] Rheumatoid arthritis(714.0), Sleep apnea, 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[facility] [address] Substance abuse ([facility] [address] Suicidal ideation, Suicide attempt by hanging ([facility] [address] Tobacco dependence, Ulcerative colitis ([facility] [address] and UTI (urinary tract infection). has a past surgical history that includes Colonoscopy; bronchoscopy; other surgical history; Upper gastrointestinal endoscopy (2/4/16); pr egd transoral biopsy single/multiple (N/A, 3/20/2017); sigmoidoscopy (N/A, 3/20/2017); Cholecystectomy, laparoscopic (07/14/2017); pr esophagogastroduodenoscopy transoral diagnostic (N/A, 8/9/2017); pr colonoscopy w/biopsy single/multiple (8/9/2017); Abdomen surgery; Upper gastrointestinal endoscopy (N/A, 6/13/2018); and Upper gastrointestinal endoscopy (N/A, 9/20/2018).     Social History     Socioeconomic History    Marital status: Single     Spouse name: Not on file    Number of children: Not on file    Years of education: Not on file    Highest education level: Not on file   Occupational History    Occupation: disability   Social Needs    Financial resource strain: Not on file    Food insecurity:     Worry: Not on file     Inability: Not on file    Transportation needs:     Medical: Not on file     Non-medical: Not on file   Tobacco Use    Smoking status: Current Every Day Smoker     Packs/day: 0.50     Years: 5.00     Pack years: 2.50     Types: E-Cigarettes, Cigarettes, Cigars    Smokeless tobacco: Never Used    Tobacco comment: states he smokes 3 cigars a day   Substance and Sexual Activity    Alcohol use: Yes     Alcohol/week: 0.0 standard drinks     Comment: socially    Drug use: No     Comment: on methadone, h/o of drug use    Sexual activity: Yes     Partners: Female     Comment: Lives alone, not working. Lifestyle    Physical activity:     Days per week: Not on file     Minutes per session: Not on file    Stress: Not on file   Relationships    Social connections:     Talks on phone: Not on file     Gets together: Not on file     Attends [employer] service: Not on file     Active member of club or organization: Not on file     Attends meetings of clubs or organizations: Not on file     Relationship status: Not on file    Intimate partner violence:     Fear of current or ex partner: Not on file     Emotionally abused: Not on file     Physically abused: Not on file     Forced sexual activity: Not on file   Other Topics Concern    Not on file   Social History Narrative    ** Merged History Encounter **            Family History   Problem Relation Age of Onset    Diabetes Father     Alcohol Abuse Father     Depression Father     Arthritis Father     High Blood Pressure Father     Other Father         aneurysm & epilepsy    Migraines Father     Arthritis Mother     Other Mother         aneurysm & epilepsy    Migraines Mother     Diabetes Brother         Aunt and uncles    Depression Brother     Mental Illness Brother     Other Brother         epilepsy    Migraines Brother     Stroke Other         Uncle    Other Brother         murdered Oct 6th, 2014    Colon Cancer Paternal Cousin 43    Other Sister         epilepsy   Israel Torrez Migraines Sister        Allergies:  Dicyclomine; Famotidine; Geodon [ziprasidone hcl]; Haldol [haloperidol]; Iv dye [iodides]; Bentyl [dicyclomine hcl]; and Flagyl [metronidazole]    Home Medications:  Prior to Admission medications    Medication Sig Start Date End Date Taking?  Authorizing Provider   docusate sodium (COLACE) 100 MG capsule Take 1 capsule by mouth 2 times daily 12/23/19   Shanice Gee DO   amoxicillin-clavulanate (AUGMENTIN) 875-125 MG per tablet amoxicillin 875 mg-potassium clavulanate 125 mg tablet   twice a day for 10 days    Historical Provider, MD   promethazine (PHENERGAN) 12.5 MG tablet Take 1 tablet by mouth 2 times daily 10/17/19   Gwendolyn Tong MD   tiZANidine (ZANAFLEX) 4 MG tablet Take 1 tablet by mouth 3 times daily 10/17/19   Gwendolyn Tong MD   pantoprazole (PROTONIX) 40 MG tablet Take 1 tablet by mouth every morning (before breakfast) 10/11/19   Sunita Brown MD   ondansetron (ZOFRAN ODT) 4 MG disintegrating tablet Take 1 tablet by mouth every 8 hours as needed for Nausea 10/11/19   Sunita Brown MD   naproxen (NAPROSYN) 500 MG tablet Take 1 tablet by mouth 2 times daily 10/11/19   Sunita Brown MD   albuterol sulfate HFA (VENTOLIN HFA) 108 (90 Base) MCG/ACT inhaler Inhale 2 puffs into the lungs every 6 hours as needed for Wheezing 10/11/19   Sunita Brown MD   permethrin (ELIMITE) 5 % cream Thoroughly massage cream (30 g for average adult) from head to soles of feet; leave on for 8 to 14 hours before removing (shower or bath). You may repeat this in 2 weeks if you still notice living mites. 7/19/19   Virginia De Jesus MD   ketorolac (TORADOL) 10 MG tablet Take 1 tablet by mouth every 6 hours as needed for Pain 3/23/19   Elisha Reed MD   ARIPiprazole (ABILIFY) 20 MG tablet Take 1 tablet by mouth daily 2/28/19   MASSIEL Ross CNP   ARIPiprazole lauroxil (ARISTADA) 882 MG/3.2ML PRSY injection Inject 3.2 mLs into the muscle every 30 days 3/26/19   MASSIEL Ross CNP   DULoxetine (CYMBALTA) 60 MG extended release capsule Take 1 capsule by mouth daily 2/28/19   MASSIEL Ross CNP   gabapentin (NEURONTIN) 300 MG capsule Take 1 capsule by mouth 3 times daily for 14 days. . 2/27/19 3/13/19  MASSIEL Ross - CNP   melatonin ER 1 MG TBCR tablet Take 1 tablet by mouth nightly as needed (Sleep) 2/27/19   Mancel Stains, APRN - CNP   traZODone (DESYREL) 50 MG tablet Take 1 tablet by mouth nightly as needed for Sleep 2/27/19   Mancel Stains, APRN - CNP   Fluticasone Furoate-Vilanterol (BREO ELLIPTA) 200-25 MCG/INH AEPB Inhale 1 puff into the lungs daily 10/1/18   Elisa Leigh MD   vitamin D (ERGOCALCIFEROL) 71302 units capsule Take 1 capsule by mouth once a week  Patient taking differently: Take 50,000 Units by mouth once a week  10/6/18   Elisa Leigh MD       REVIEW OF SYSTEMS    (2-9 systems for level 4, 10 or more for level 5)      Review of Systems   Constitutional: Positive for chills and fever (103 F  ). HENT: Positive for sore throat. Negative for rhinorrhea. Eyes: Negative for redness and itching. Respiratory: Positive for cough and shortness of breath. Cardiovascular: Negative for chest pain and palpitations. Gastrointestinal: Positive for abdominal pain, blood in stool, diarrhea, nausea and vomiting (x4). Genitourinary: Negative for dysuria, frequency and hematuria. Skin: Positive for rash (back). Negative for wound. Allergic/Immunologic: Negative for environmental allergies and food allergies. Neurological: Positive for dizziness and weakness (bilateral feet). Negative for numbness. Psychiatric/Behavioral: Negative for suicidal ideas. PHYSICAL EXAM   (up to 7 for level 4, 8 or more for level 5)      INITIAL VITALS:   BP (!) 142/86   Pulse 99   Temp 97.5 °F (36.4 °C) (Axillary)   Resp 9   Ht 5' 9\" (1.753 m)   Wt 225 lb (102.1 kg)   SpO2 93%   BMI 33.23 kg/m²     Physical Exam  Constitutional:       General: He is not in acute distress. Appearance: Normal appearance. He is not ill-appearing, toxic-appearing or diaphoretic. HENT:      Head: Normocephalic and atraumatic. Mouth/Throat:      Mouth: Mucous membranes are moist.      Pharynx: Oropharynx is clear.  No oropharyngeal albuterol sulfate  (90 Base) MCG/ACT inhaler 2 puff    DISCONTD: ipratropium (ATROVENT HFA) 17 MCG/ACT inhaler 2 puff    DISCONTD: ipratropium (ATROVENT) 0.02 % nebulizer solution 0.5 mg    hydrOXYzine (ATARAX) tablet 10 mg     DIAGNOSTIC RESULTS / EMERGENCYDEPARTMENT COURSE / MDM     LABS:  Results for orders placed or performed during the hospital encounter of 01/09/20   CBC WITH AUTO DIFFERENTIAL   Result Value Ref Range    WBC 11.2 3.5 - 11.3 k/uL    RBC 4.03 (L) 4.21 - 5.77 m/uL    Hemoglobin 12.1 (L) 13.0 - 17.0 g/dL    Hematocrit 37.6 (L) 40.7 - 50.3 %    MCV 93.3 82.6 - 102.9 fL    MCH 30.0 25.2 - 33.5 pg    MCHC 32.2 28.4 - 34.8 g/dL    RDW 13.9 11.8 - 14.4 %    Platelets 282 200 - 654 k/uL    MPV 10.0 8.1 - 13.5 fL    NRBC Automated 0.2 (H) 0.0 per 100 WBC    Differential Type NOT REPORTED     Seg Neutrophils 65 36 - 65 %    Lymphocytes 27 24 - 43 %    Monocytes 6 3 - 12 %    Eosinophils % 0 (L) 1 - 4 %    Basophils 0 0 - 2 %    Immature Granulocytes 2 (H) 0 %    Segs Absolute 7.18 1.50 - 8.10 k/uL    Absolute Lymph # 3.02 1.10 - 3.70 k/uL    Absolute Mono # 0.67 0.10 - 1.20 k/uL    Absolute Eos # 0.04 0.00 - 0.44 k/uL    Basophils Absolute 0.03 0.00 - 0.20 k/uL    Absolute Immature Granulocyte 0.25 0.00 - 0.30 k/uL    WBC Morphology NOT REPORTED     RBC Morphology NOT REPORTED     Platelet Estimate NOT REPORTED    Comprehensive Metabolic Panel   Result Value Ref Range    Glucose 109 (H) 70 - 99 mg/dL    BUN 20 6 - 20 mg/dL    CREATININE 0.73 0.70 - 1.20 mg/dL    Bun/Cre Ratio NOT REPORTED 9 - 20    Calcium 8.6 8.6 - 10.4 mg/dL    Sodium 138 135 - 144 mmol/L    Potassium 4.0 3.7 - 5.3 mmol/L    Chloride 98 98 - 107 mmol/L    CO2 28 20 - 31 mmol/L    Anion Gap 12 9 - 17 mmol/L    Alkaline Phosphatase 44 40 - 129 U/L    ALT 20 5 - 41 U/L    AST 26 <40 U/L    Total Bilirubin <0.10 (L) 0.3 - 1.2 mg/dL    Total Protein 6.7 6.4 - 8.3 g/dL    Alb 4.0 3.5 - 5.2 g/dL    Albumin/Globulin Ratio 1.5 1.0 - 2. 5    GFR Non-African American >60 >60 mL/min    GFR African American >60 >60 mL/min    GFR Comment          GFR Staging NOT REPORTED    LIPASE   Result Value Ref Range    Lipase 16 13 - 60 U/L   TOX SCR, BLD, ED   Result Value Ref Range    Ethanol <10 <10 mg/dL    Ethanol percent <3.712 <4.896 %    Salicylate Lvl <1 (L) 3 - 10 mg/dL    Acetaminophen Level <5 (L) 10 - 30 ug/mL    Toxic Tricyclic Sc,Blood NEGATIVE NEGATIVE   EKG 12 Lead   Result Value Ref Range    Ventricular Rate 100 BPM    Atrial Rate 100 BPM    P-R Interval 136 ms    QRS Duration 92 ms    Q-T Interval 332 ms    QTc Calculation (Bazett) 428 ms    P Axis 51 degrees    R Axis 47 degrees    T Axis 34 degrees     RADIOLOGY:  No orders to display      EKG  Rhythm: normal sinus   Rate: normal  Axis: normal  Ectopy: none  Conduction: normal  ST Segments: no acute change  T Waves: no acute change  Q Waves: none    Clinical Impression: When compared to EKG dated 12/23/2019, no acute changes and non-specific EKG    Normal Interval Reference:  P-wave <110 ms  -200 ms  QRS <100 ms  QT <420 ms  QTc 330-470 ms    All EKG's are interpreted by the Emergency Department Physician who either signs or Co-signsthis chart in the absence of a cardiologist.    EMERGENCY DEPARTMENT COURSE:    Patient evaluated by attending physician and myself. Patient presenting with excessive somnolence after taking approximate 600 mg of Seroquel, as well as 1200 mg of Neurontin. On exam he is somnolent but arousable. He also complains of some abdominal pain. Slightly hypertensive but vitals are otherwise unremarkable. Heart regular rate and rhythm, lungs are clear to auscultation bilaterally. Abdomen is soft with minimal tenderness to palpation left lower quadrant. Cranial nerves II to XII are intact patient is full strength and sensation upper lower extremities bilaterally. He is alert and oriented x3 when awake.   He has complained of a rash across his chest, however I do not appreciate any rash on exam.  Differential includes medication overdose, intentional overdose, drug use, constipation, pancreatitis, cholecystitis, functional abdominal pain, among others. Plan is for consultation with poison control, toxicology screen, abdominal labs, symptomatic treatment and reassess. Tach screen and blood work is unremarkable. Patient was watched in the emergency department for several hours with some mild improvement of his somnolence, as well as improvement of his abdominal pain however he remained somewhat somnolent. Patient was signed out to Dr. Eliceo Chicas pending reassessment. I did perform a second abdominal exam the patient is completely nontender with no rebound or guarding. PROCEDURES:  none    CONSULTS:  None    FINAL IMPRESSION      1. Acute drug overdose, accidental or unintentional, initial encounter    2.  Generalized abdominal pain          DISPOSITION / PLAN     DISPOSITION        PATIENT REFERRED TO:  [facility]  [address] [address]  [phone]  Schedule an appointment as soon as possible for a visit         DISCHARGE MEDICATIONS:  Discharge Medication List as of 1/9/2020  7:59 PM          Yue Rouse DO  Emergency Medicine Resident  [address] [facility]    (Please note that portions of this note were completed with a voice recognition program.  Efforts were made to edit thedictations but occasionally words are mis-transcribed.)      Yue Rouse DO  Resident  01/10/20 [id number]

## 2020-01-10 LAB
EKG ATRIAL RATE: 100 BPM
EKG P AXIS: 51 DEGREES
EKG P-R INTERVAL: 136 MS
EKG Q-T INTERVAL: 332 MS
EKG QRS DURATION: 92 MS
EKG QTC CALCULATION (BAZETT): 428 MS
EKG R AXIS: 47 DEGREES
EKG T AXIS: 34 DEGREES
EKG VENTRICULAR RATE: 100 BPM

## 2020-01-10 NOTE — ED NOTES
Writer met with patient at bedside regarding discharge plan. Jeannie spoke with [employer] and [facility], patient is not currently expected at either of these locations. Patients reports that he had a recent change in his Seroquel dosage and that made him more tired than he expected, he states as a result he was not able to make it through his interview for the [facility] through [facility]. Patient appears to have some confusion regarding reason that he cannot stay at the [address], despite writers attempts to help him understand that he needs to complete the interview tomorrow at [facility]. Writer informed patient that she will assist patient with transportation to his friends home for the evening if he is able to provide an address. When directly asked, patient denied suicidal ideations and stated that he did not overdose of Seroquel today. He reports that it was the increase in dose that made him more tired than usual.  Writer provided update to physician. Writer provided patient with a box lunch as physician stated patient could eat. Patient reports understanding to return to [facility] tomorrow to complete interview for housing.       SALLY Lawrence  01/09/20 2003

## 2020-01-13 ENCOUNTER — HOSPITAL ENCOUNTER (EMERGENCY)
Age: 30
Discharge: HOME | End: 2020-01-13
Attending: EMERGENCY MEDICINE
Payer: MEDICARE

## 2020-01-13 ENCOUNTER — APPOINTMENT (OUTPATIENT)
Dept: GENERAL RADIOLOGY | Age: 30
End: 2020-01-13
Payer: MEDICARE

## 2020-01-13 VITALS
HEART RATE: 114 BPM | DIASTOLIC BLOOD PRESSURE: 84 MMHG | BODY MASS INDEX: 39.99 KG/M2 | RESPIRATION RATE: 17 BRPM | WEIGHT: 270 LBS | HEIGHT: 69 IN | SYSTOLIC BLOOD PRESSURE: 130 MMHG | OXYGEN SATURATION: 94 % | TEMPERATURE: 98 F

## 2020-01-13 LAB
ABSOLUTE EOS #: 0.24 K/UL (ref 0–0.44)
ABSOLUTE IMMATURE GRANULOCYTE: 0.05 K/UL (ref 0–0.3)
ABSOLUTE LYMPH #: 1.9 K/UL (ref 1.1–3.7)
ABSOLUTE MONO #: 0.69 K/UL (ref 0.1–1.2)
ANION GAP SERPL CALCULATED.3IONS-SCNC: 10 MMOL/L (ref 9–17)
BASOPHILS # BLD: 0 % (ref 0–2)
BASOPHILS ABSOLUTE: <0.03 K/UL (ref 0–0.2)
BNP INTERPRETATION: NORMAL
BUN BLDV-MCNC: 14 MG/DL (ref 6–20)
BUN/CREAT BLD: ABNORMAL (ref 9–20)
CALCIUM SERPL-MCNC: 9.2 MG/DL (ref 8.6–10.4)
CHLORIDE BLD-SCNC: 98 MMOL/L (ref 98–107)
CO2: 29 MMOL/L (ref 20–31)
CREAT SERPL-MCNC: 0.74 MG/DL (ref 0.7–1.2)
D-DIMER QUANTITATIVE: 0.47 MG/L FEU
DIFFERENTIAL TYPE: ABNORMAL
EOSINOPHILS RELATIVE PERCENT: 4 % (ref 1–4)
GFR AFRICAN AMERICAN: >60 ML/MIN
GFR NON-AFRICAN AMERICAN: >60 ML/MIN
GFR SERPL CREATININE-BSD FRML MDRD: ABNORMAL ML/MIN/{1.73_M2}
GFR SERPL CREATININE-BSD FRML MDRD: ABNORMAL ML/MIN/{1.73_M2}
GLUCOSE BLD-MCNC: 105 MG/DL (ref 70–99)
HCT VFR BLD CALC: 37.2 % (ref 40.7–50.3)
HEMOGLOBIN: 12 G/DL (ref 13–17)
IMMATURE GRANULOCYTES: 1 %
LYMPHOCYTES # BLD: 31 % (ref 24–43)
MCH RBC QN AUTO: 30.2 PG (ref 25.2–33.5)
MCHC RBC AUTO-ENTMCNC: 32.3 G/DL (ref 28.4–34.8)
MCV RBC AUTO: 93.5 FL (ref 82.6–102.9)
MONOCYTES # BLD: 11 % (ref 3–12)
NRBC AUTOMATED: 0.5 PER 100 WBC
PDW BLD-RTO: 13.8 % (ref 11.8–14.4)
PLATELET # BLD: 221 K/UL (ref 138–453)
PLATELET ESTIMATE: ABNORMAL
PMV BLD AUTO: 10 FL (ref 8.1–13.5)
POTASSIUM SERPL-SCNC: 4.5 MMOL/L (ref 3.7–5.3)
PRO-BNP: 63 PG/ML
RBC # BLD: 3.98 M/UL (ref 4.21–5.77)
RBC # BLD: ABNORMAL 10*6/UL
SEG NEUTROPHILS: 53 % (ref 36–65)
SEGMENTED NEUTROPHILS ABSOLUTE COUNT: 3.28 K/UL (ref 1.5–8.1)
SODIUM BLD-SCNC: 137 MMOL/L (ref 135–144)
TROPONIN INTERP: NORMAL
TROPONIN T: NORMAL NG/ML
TROPONIN, HIGH SENSITIVITY: <6 NG/L (ref 0–22)
WBC # BLD: 6.2 K/UL (ref 3.5–11.3)
WBC # BLD: ABNORMAL 10*3/UL

## 2020-01-13 PROCEDURE — 80048 BASIC METABOLIC PNL TOTAL CA: CPT

## 2020-01-13 PROCEDURE — 96372 THER/PROPH/DIAG INJ SC/IM: CPT

## 2020-01-13 PROCEDURE — 85025 COMPLETE CBC W/AUTO DIFF WBC: CPT

## 2020-01-13 PROCEDURE — 85379 FIBRIN DEGRADATION QUANT: CPT

## 2020-01-13 PROCEDURE — 71046 X-RAY EXAM CHEST 2 VIEWS: CPT

## 2020-01-13 PROCEDURE — 83880 ASSAY OF NATRIURETIC PEPTIDE: CPT

## 2020-01-13 PROCEDURE — 99285 EMERGENCY DEPT VISIT HI MDM: CPT

## 2020-01-13 PROCEDURE — 6360000002 HC RX W HCPCS: Performed by: EMERGENCY MEDICINE

## 2020-01-13 PROCEDURE — 93005 ELECTROCARDIOGRAM TRACING: CPT

## 2020-01-13 PROCEDURE — 84484 ASSAY OF TROPONIN QUANT: CPT

## 2020-01-13 RX ORDER — DIPHENHYDRAMINE HYDROCHLORIDE 50 MG/ML
25 INJECTION INTRAMUSCULAR; INTRAVENOUS ONCE
Status: COMPLETED | OUTPATIENT
Start: 2020-01-13 | End: 2020-01-13

## 2020-01-13 RX ORDER — IBUPROFEN 800 MG/1
800 TABLET ORAL EVERY 8 HOURS PRN
Qty: 30 TABLET | Refills: 0 | Status: SHIPPED | OUTPATIENT
Start: 2020-01-13 | End: 2021-01-26

## 2020-01-13 RX ORDER — ACETAMINOPHEN 325 MG/1
650 TABLET ORAL ONCE
Status: DISCONTINUED | OUTPATIENT
Start: 2020-01-13 | End: 2020-01-13 | Stop reason: HOSPADM

## 2020-01-13 RX ORDER — PROMETHAZINE HYDROCHLORIDE 25 MG/ML
12.5 INJECTION, SOLUTION INTRAMUSCULAR; INTRAVENOUS ONCE
Status: COMPLETED | OUTPATIENT
Start: 2020-01-13 | End: 2020-01-13

## 2020-01-13 RX ADMIN — DIPHENHYDRAMINE HYDROCHLORIDE 25 MG: 50 INJECTION, SOLUTION INTRAMUSCULAR; INTRAVENOUS at 14:49

## 2020-01-13 RX ADMIN — PROMETHAZINE HYDROCHLORIDE 12.5 MG: 25 INJECTION INTRAMUSCULAR; INTRAVENOUS at 14:49

## 2020-01-13 ASSESSMENT — PAIN DESCRIPTION - ORIENTATION: ORIENTATION: RIGHT;LEFT

## 2020-01-13 ASSESSMENT — PAIN DESCRIPTION - LOCATION: LOCATION: LEG

## 2020-01-13 ASSESSMENT — PAIN SCALES - GENERAL: PAINLEVEL_OUTOF10: 8

## 2020-01-13 ASSESSMENT — PAIN DESCRIPTION - PAIN TYPE: TYPE: ACUTE PAIN

## 2020-01-13 ASSESSMENT — PAIN DESCRIPTION - DESCRIPTORS: DESCRIPTORS: BURNING;CONSTANT

## 2020-01-13 NOTE — ED PROVIDER NOTES
[facility] ED  Emergency Department EncounterMedicine Resident     Pt Name: Rosalinda Mullins  MRN: [id number]  [place] 1990  Date ofevaluation: 1/13/20  PCP:  No primary care provider on file. CHIEF COMPLAINT       Chief Complaint   Patient presents with    Chest Pain     c/o sharp left sided chest pain x3 days    Shortness of Breath    Nausea    Leg Pain     c/o BLE pain and swelling x3 days    Fall     pt states he fell 2 days ago hitting to of his head no LOC     HISTORY OF PRESENT ILLNESS  (Location/Symptom, Timing/Onset, Context/Setting, Quality, Duration, Modifying Factors, Severity.)      Rosalinda Mullins is a 34 y.o. male w/ hx of prior DVT, not currently anticoagulated, substance abuse who presents w/ bilateral leg pain and leg swelling x 3 days. Also complaining of L sided aching chest pain x 3 days with no radiation. Has had shortness of breath, cough, congestion, tactile fever and chills, nausea. Denies vomiting, shortness of breath, diaphoresis, weakness, numbness, tingling. Also states he had a mechanical fall 2 days ago hitting his head, denies LOC. Denies vision changes, weakness, numbness, tingling. Does report a mild aching headache. REVIEW OF SYSTEMS    (2-9 systems for level 4, 10 or more for level 5)      Review of Systems   Constitutional: Positive for chills and fever. HENT: Positive for congestion. Negative for sneezing, trouble swallowing and voice change. Eyes: Negative for visual disturbance. Respiratory: Positive for cough and shortness of breath. Cardiovascular: Positive for chest pain and leg swelling. Gastrointestinal: Positive for nausea. Negative for abdominal pain, constipation, diarrhea and vomiting. Musculoskeletal: Negative for gait problem. Skin: Negative for rash. Neurological: Positive for headaches. Negative for dizziness, syncope, weakness and numbness. Psychiatric/Behavioral: Negative for confusion.      PAST MEDICAL / included in HPI if pertinent. ALLERGIES / IMMUNIZATIONS / HOME MEDICATIONS     Allergies: Dicyclomine; Famotidine; Geodon [ziprasidone hcl]; Haldol [haloperidol]; Iv dye [iodides]; Bentyl [dicyclomine hcl]; and Flagyl [metronidazole]    IMMUNIZATIONS    Immunization History   Administered Date(s) Administered    Pneumococcal Polysaccharide ([id number]) 06/20/2016         Home Medications:  Prior to Admission medications    Medication Sig Start Date End Date Taking? Authorizing Provider   ibuprofen (ADVIL;MOTRIN) 800 MG tablet Take 1 tablet by mouth every 8 hours as needed for Pain 1/13/20  Yes Martha Odom, DO   docusate sodium (COLACE) 100 MG capsule Take 1 capsule by mouth 2 times daily 12/23/19   Ralf Peter, DO   amoxicillin-clavulanate (AUGMENTIN) 875-125 MG per tablet amoxicillin 875 mg-potassium clavulanate 125 mg tablet   twice a day for 10 days    Historical Provider, MD   promethazine (PHENERGAN) 12.5 MG tablet Take 1 tablet by mouth 2 times daily 10/17/19   Cora James MD   tiZANidine (ZANAFLEX) 4 MG tablet Take 1 tablet by mouth 3 times daily 10/17/19   Cora James MD   pantoprazole (PROTONIX) 40 MG tablet Take 1 tablet by mouth every morning (before breakfast) 10/11/19   Elbert Mock MD   ondansetron (ZOFRAN ODT) 4 MG disintegrating tablet Take 1 tablet by mouth every 8 hours as needed for Nausea 10/11/19   Elbert Mock MD   naproxen (NAPROSYN) 500 MG tablet Take 1 tablet by mouth 2 times daily 10/11/19   Elbert Mock MD   albuterol sulfate HFA (VENTOLIN HFA) 108 (90 Base) MCG/ACT inhaler Inhale 2 puffs into the lungs every 6 hours as needed for Wheezing 10/11/19   Elbert Mock MD   permethrin (ELIMITE) 5 % cream Thoroughly massage cream (30 g for average adult) from head to soles of feet; leave on for 8 to 14 hours before removing (shower or bath). You may repeat this in 2 weeks if you still notice living mites.  7/19/19   Carolyn Reyes MD   ketorolac Normal range of motion and neck supple. Cardiovascular:      Rate and Rhythm: Regular rhythm. Tachycardia present. Pulses: Normal pulses. Heart sounds: Normal heart sounds. Pulmonary:      Effort: Pulmonary effort is normal. No respiratory distress. Breath sounds: Normal breath sounds. No wheezing or rhonchi. Chest:      Chest wall: No tenderness. Abdominal:      General: There is no distension. Palpations: Abdomen is soft. Tenderness: There is no tenderness. Musculoskeletal:         General: No deformity. Comments: 2+ pitting edema BLE, dp and pt pulses 2+ bilaterally, tenderness diffusely to palpation along both legs both anteriorly and posteriorly, no cords palpated, homans negative   Skin:     General: Skin is warm and dry. Neurological:      Mental Status: He is alert and oriented to person, place, and time. Psychiatric:         Behavior: Behavior normal.       Vitals:    Vitals:    01/13/20 1311 01/13/20 1346   BP: (!) 147/83 130/84   Pulse: 119 114   Resp: 14 17   Temp: 98 °F (36.7 °C)    TempSrc: Oral    SpO2: 93% 94%   Weight: 270 lb (122.5 kg)    Height: 5' 9\" (1.753 m)      DIFFERENTIAL  DIAGNOSIS     PLAN (LABS / IMAGING / EKG):  Orders Placed This Encounter   Procedures    XR CHEST STANDARD (2 VW)    CBC WITH AUTO DIFFERENTIAL    Basic Metabolic Panel    Troponin    Brain Natriuretic Peptide    D-Dimer, Quantitative    EKG 12 Lead     Plan of care is reviewed and discussed with the family/ patient when able. Family/ Patient consents to treatment and plan if able to do so.     MEDICATIONS ORDERED:  Orders Placed This Encounter   Medications    DISCONTD: acetaminophen (TYLENOL) tablet 650 mg    promethazine (PHENERGAN) injection 12.5 mg    diphenhydrAMINE (BENADRYL) injection 25 mg    ibuprofen (ADVIL;MOTRIN) 800 MG tablet     Sig: Take 1 tablet by mouth every 8 hours as needed for Pain     Dispense:  30 tablet     Refill:  0     DIAGNOSTIC RESULTS and swelling for the past 3 days, also with left-sided aching chest pain, shortness of breath, cough, congestion, tactile fevers and chills, nausea. Patient also reporting that he had a mechanical fall 2 days prior and hit his head however denies loss of consciousness. Does have a mild headache however denies any neck or back pain, tenderness, numbness, tingling visual changes. On evaluation patient is resting comfortably, no acute distress. He is mildly tachycardic, vitals are otherwise unremarkable. Heart is regular rhythm, lungs are clear to auscultation bilaterally, abdomen is soft and nontender, there are no gross focal neurologic deficits noted. He does have 2+ pitting edema to bilateral lower extremities as well as diffuse tenderness to his legs, neurovascularly intact. Doubt bilateral dvts however given patient's history will obtain ddimer, will also check CBC, BMP, troponin, EKG, chest x-ray, BN P. Patient refused ibuprofen. Is requesting benadryl and phenergan. Work up unremarkable. Will d/c w/ pcp follow up. Discussed return precautions. Patient is agreeable w/ plan. PROCEDURES:  Procedures    CONSULTS:  None    CRITICAL CARE:  See attending note    IMPRESSION      1. Chest pain, unspecified type        DISPOSITION / PLAN     DISPOSITION Decision To Discharge 01/13/2020 04:17:37 PM    If the patient was admitted, some of the above orders, medications, labs, and consults may have been placed by the admitting team(s) and were auto populated above when I refreshed my note prior to signing it. If there is any question, please check for the responsible provider for individual orders in the EMR system. If discharged, the patient was instructed to return to the emergency department with any worsening symptoms, if new symptoms arise, or if they have any other concerns.   Patient was instructed not to drive home if discharged today and received pain medications or other mind-altering

## 2020-01-14 ASSESSMENT — ENCOUNTER SYMPTOMS
COUGH: 1
CONSTIPATION: 0
SHORTNESS OF BREATH: 1
ABDOMINAL PAIN: 0
VOICE CHANGE: 0
TROUBLE SWALLOWING: 0
VOMITING: 0
NAUSEA: 1
DIARRHEA: 0

## 2020-01-15 ENCOUNTER — OFFICE VISIT (OUTPATIENT)
Dept: PRIMARY CARE CLINIC | Age: 30
End: 2020-01-15
Payer: MEDICARE

## 2020-01-15 VITALS
BODY MASS INDEX: 37.27 KG/M2 | SYSTOLIC BLOOD PRESSURE: 138 MMHG | HEART RATE: 110 BPM | DIASTOLIC BLOOD PRESSURE: 86 MMHG | TEMPERATURE: 98.1 F | WEIGHT: 252.4 LBS

## 2020-01-15 PROCEDURE — 4004F PT TOBACCO SCREEN RCVD TLK: CPT | Performed by: NURSE PRACTITIONER

## 2020-01-15 PROCEDURE — G8417 CALC BMI ABV UP PARAM F/U: HCPCS | Performed by: NURSE PRACTITIONER

## 2020-01-15 PROCEDURE — G8427 DOCREV CUR MEDS BY ELIG CLIN: HCPCS | Performed by: NURSE PRACTITIONER

## 2020-01-15 PROCEDURE — G8484 FLU IMMUNIZE NO ADMIN: HCPCS | Performed by: NURSE PRACTITIONER

## 2020-01-15 PROCEDURE — 99202 OFFICE O/P NEW SF 15 MIN: CPT | Performed by: NURSE PRACTITIONER

## 2020-01-15 RX ORDER — CEPHALEXIN 500 MG/1
CAPSULE ORAL
COMMUNITY
Start: 2020-01-09 | End: 2021-01-26

## 2020-01-15 RX ORDER — PERMETHRIN 50 MG/G
CREAM TOPICAL ONCE
Qty: 60 G | Refills: 1 | Status: SHIPPED | OUTPATIENT
Start: 2020-01-15 | End: 2020-02-03 | Stop reason: SDUPTHER

## 2020-01-15 RX ORDER — PROMETHAZINE HYDROCHLORIDE 25 MG/1
25 TABLET ORAL 4 TIMES DAILY PRN
Qty: 20 TABLET | Refills: 0 | Status: SHIPPED | OUTPATIENT
Start: 2020-01-15 | End: 2020-01-22

## 2020-01-15 ASSESSMENT — ENCOUNTER SYMPTOMS
ABDOMINAL PAIN: 0
COUGH: 0
SORE THROAT: 0
SINUS PAIN: 0
DIARRHEA: 0
VOMITING: 0
NAUSEA: 1

## 2020-01-15 NOTE — PROGRESS NOTES
capsule Take 1 capsule by mouth once a week (Patient taking differently: Take 50,000 Units by mouth once a week ) 30 capsule 0    gabapentin (NEURONTIN) 300 MG capsule Take 1 capsule by mouth 3 times daily for 14 days. . 42 capsule 0     No current facility-administered medications for this visit. Allergies   Allergen Reactions    Dicyclomine Anaphylaxis    Famotidine Anaphylaxis    Geodon [Ziprasidone Hcl] Anaphylaxis    Haldol [Haloperidol] Anaphylaxis    Iv Dye [Iodides] Nausea And Vomiting and Rash     Ok to take with benadryl - itching only no rash or anaphylaxis  Needs benadryl.  Bentyl [Dicyclomine Hcl]      08--allergy removed-pt sts today it is his allergy    Flagyl [Metronidazole] Swelling     Patient says he isn't allergic to this med 08-  Today 08- it is an allergy  02/13/19 states he is not allergic        Subjective:      Review of Systems   Constitutional: Negative for chills and fever. HENT: Negative for congestion, ear pain, sinus pain and sore throat. Respiratory: Negative for cough. Cardiovascular: Negative for chest pain. Gastrointestinal: Positive for nausea. Negative for abdominal pain, diarrhea and vomiting. Skin: Positive for rash. All other systems reviewed and are negative. Objective:     Physical Exam  Vitals signs and nursing note reviewed. Constitutional:       Appearance: Normal appearance. HENT:      Right Ear: Tympanic membrane normal.      Left Ear: Tympanic membrane normal.      Nose: Nose normal.      Mouth/Throat:      Mouth: Mucous membranes are moist.   Cardiovascular:      Rate and Rhythm: Normal rate. Pulmonary:      Effort: Pulmonary effort is normal.      Breath sounds: Normal breath sounds. Abdominal:      Palpations: Abdomen is soft. Tenderness: There is no tenderness. There is no guarding. Skin:     General: Skin is warm and dry.       Findings: Rash (mild erythema to chest, excoriations from scratching) present. Neurological:      General: No focal deficit present. Mental Status: He is alert and oriented to person, place, and time.        /86 (Site: Left Upper Arm, Position: Sitting, Cuff Size: Large Adult)   Pulse 110   Temp 98.1 °F (36.7 °C) (Oral)   Wt 252 lb 6.4 oz (114.5 kg)   BMI 37.27 kg/m²   Lab Review   Admission on 01/13/2020, Discharged on 01/13/2020   Component Date Value    WBC 01/13/2020 6.2     RBC 01/13/2020 3.98*    Hemoglobin 01/13/2020 12.0*    Hematocrit 01/13/2020 37.2*    MCV 01/13/2020 93.5     MCH 01/13/2020 30.2     MCHC 01/13/2020 32.3     RDW 01/13/2020 13.8     Platelets 48/36/0318 221     MPV 01/13/2020 10.0     NRBC Automated 01/13/2020 0.5*    Differential Type 01/13/2020 NOT REPORTED     WBC Morphology 01/13/2020 NOT REPORTED     RBC Morphology 01/13/2020 NOT REPORTED     Platelet Estimate 92/43/8069 NOT REPORTED     Seg Neutrophils 01/13/2020 53     Lymphocytes 01/13/2020 31     Monocytes 01/13/2020 11     Eosinophils % 01/13/2020 4     Basophils 01/13/2020 0     Immature Granulocytes 01/13/2020 1*    Segs Absolute 01/13/2020 3.28     Absolute Lymph # 01/13/2020 1.90     Absolute Mono # 01/13/2020 0.69     Absolute Eos # 01/13/2020 0.24     Basophils Absolute 01/13/2020 <0.03     Absolute Immature Granul* 01/13/2020 0.05     Glucose 01/13/2020 105*    BUN 01/13/2020 14     CREATININE 01/13/2020 0.74     Bun/Cre Ratio 01/13/2020 NOT REPORTED     Calcium 01/13/2020 9.2     Sodium 01/13/2020 137     Potassium 01/13/2020 4.5     Chloride 01/13/2020 98     CO2 01/13/2020 29     Anion Gap 01/13/2020 10     GFR Non- 01/13/2020 >60     GFR  01/13/2020 >60     GFR Comment 01/13/2020          GFR Staging 01/13/2020 NOT REPORTED     Troponin, High Sensitivi* 01/13/2020 <6     Troponin T 01/13/2020 NOT REPORTED     Troponin Interp 01/13/2020 NOT REPORTED     Pro-BNP 01/13/2020 63     BNP Interpretation 0.0     Differential Type 12/23/2019 NOT REPORTED     Seg Neutrophils 12/23/2019 54     Lymphocytes 12/23/2019 36     Monocytes 12/23/2019 7     Eosinophils % 12/23/2019 2     Basophils 12/23/2019 1     Immature Granulocytes 12/23/2019 0     Segs Absolute 12/23/2019 3.95     Absolute Lymph # 12/23/2019 2.67     Absolute Mono # 12/23/2019 0.52     Absolute Eos # 12/23/2019 0.14     Basophils Absolute 12/23/2019 0.04     Absolute Immature Granul* 12/23/2019 <0.03     WBC Morphology 12/23/2019 NOT REPORTED     RBC Morphology 12/23/2019 NOT REPORTED     Platelet Estimate 58/97/0159 NOT REPORTED     Specimen Source 12/23/2019 . BLOOD    [facility] Ordered Test 12/23/2019 CDP     Reason for Rejection 12/23/2019 Unable to perform testing: Specimen clotted.  - 12/23/2019 NOT REPORTED        Assessment:       Diagnosis Orders   1. Nausea and vomiting, intractability of vomiting not specified, unspecified vomiting type  promethazine (PHENERGAN) 25 MG tablet   2. Scabies exposure  permethrin (ELIMITE) 5 % cream       Plan:      Return if symptoms worsen or fail to improve. Orders Placed This Encounter   Medications    permethrin (ELIMITE) 5 % cream     Sig: Apply topically once for 1 dose     Dispense:  60 g     Refill:  1    promethazine (PHENERGAN) 25 MG tablet     Sig: Take 1 tablet by mouth 4 times daily as needed for Nausea     Dispense:  20 tablet     Refill:  0     Will treat nausea with phenergan. Will treat for scabies exposure with elimite. Return with any new or worsening sx. Patient given educational materials - see patient instructions. Discussed use, benefit, and side effects of prescribed medications. All patientquestions answered. Pt voiced understanding. This note was transcribed using dictation with Dragon services. Efforts were made to correct any errors but some words may be misinterpreted.      Electronically signed by MASSIEL Bryan CNP on 1/15/2020at 3:30 PM

## 2020-01-15 NOTE — PROGRESS NOTES
Visit Information    Have you changed or started any medications since your last visit including any over-the-counter medicines, vitamins, or herbal medicines? no   Are you having any side effects from any of your medications? -  no  Have you stopped taking any of your medications? Is so, why? -  no    Have you seen any other physician or provider since your last visit? No  Have you had any other diagnostic tests since your last visit? No  Have you been seen in the emergency room and/or had an admission to a hospital since we last saw you? No  Have you had your routine dental cleaning in the past 6 months? no    Have you activated your [employer] account? If not, what are your barriers?  Yes     No care team member to display    Medical History Review  Past Medical, Family, and Social History reviewed and does not contribute to the patient presenting condition    Health Maintenance   Topic Date Due    Varicella Vaccine (1 of 2 - 2-dose childhood series) 10/20/1991    DTaP/Tdap/Td vaccine (1 - Tdap) 10/20/2001    A1C test (Diabetic or Prediabetic)  10/23/2015    Annual Wellness Visit (AWV)  07/27/2019    Flu vaccine (1) 09/01/2019    Pneumococcal 0-64 years Vaccine  Completed    HIV screen  Completed

## 2020-01-15 NOTE — PATIENT INSTRUCTIONS
· You have new or worsening belly pain.     · You have a new or higher fever.     · You vomit blood or what looks like coffee grounds.    Watch closely for changes in your health, and be sure to contact your doctor if:    · You have ongoing nausea and vomiting.     · Your vomiting is getting worse.     · Your vomiting lasts longer than 2 days.     · You are not getting better as expected. Where can you learn more? Go to [email or web address] org and sign in to your [employer] account. Enter 25 [id number] in the [employer] box to learn more about \"Nausea and Vomiting: Care Instructions. \"     If you do not have an account, please click on the \"Sign Up Now\" link. Current as of: June 26, 2019  Content Version: 12.3  © 9784-3707 [employer], Inc[employer]. Care instructions adapted under license by [facility] ([facility]). If you have questions about a medical condition or this instruction, always ask your healthcare professional. [place] [address] any warranty or liability for your use of this information.

## 2020-01-17 LAB
EKG ATRIAL RATE: 117 BPM
EKG P AXIS: 47 DEGREES
EKG P-R INTERVAL: 138 MS
EKG Q-T INTERVAL: 302 MS
EKG QRS DURATION: 70 MS
EKG QTC CALCULATION (BAZETT): 421 MS
EKG R AXIS: 53 DEGREES
EKG T AXIS: 48 DEGREES
EKG VENTRICULAR RATE: 117 BPM

## 2020-02-03 ENCOUNTER — OFFICE VISIT (OUTPATIENT)
Dept: PRIMARY CARE CLINIC | Age: 30
End: 2020-02-03
Payer: MEDICARE

## 2020-02-03 VITALS
WEIGHT: 227.4 LBS | SYSTOLIC BLOOD PRESSURE: 159 MMHG | DIASTOLIC BLOOD PRESSURE: 97 MMHG | HEART RATE: 126 BPM | TEMPERATURE: 97.5 F | BODY MASS INDEX: 33.58 KG/M2

## 2020-02-03 PROCEDURE — G8484 FLU IMMUNIZE NO ADMIN: HCPCS | Performed by: INTERNAL MEDICINE

## 2020-02-03 PROCEDURE — G8417 CALC BMI ABV UP PARAM F/U: HCPCS | Performed by: INTERNAL MEDICINE

## 2020-02-03 PROCEDURE — 4004F PT TOBACCO SCREEN RCVD TLK: CPT | Performed by: INTERNAL MEDICINE

## 2020-02-03 PROCEDURE — G8427 DOCREV CUR MEDS BY ELIG CLIN: HCPCS | Performed by: INTERNAL MEDICINE

## 2020-02-03 PROCEDURE — 99213 OFFICE O/P EST LOW 20 MIN: CPT | Performed by: INTERNAL MEDICINE

## 2020-02-03 RX ORDER — PROMETHAZINE HYDROCHLORIDE 12.5 MG/1
12.5 TABLET ORAL 2 TIMES DAILY
Qty: 20 TABLET | Refills: 0 | Status: SHIPPED | OUTPATIENT
Start: 2020-02-03 | End: 2021-01-26

## 2020-02-03 RX ORDER — TIZANIDINE 2 MG/1
2 TABLET ORAL 3 TIMES DAILY PRN
Qty: 20 TABLET | Refills: 0 | Status: SHIPPED | OUTPATIENT
Start: 2020-02-03 | End: 2021-01-26

## 2020-02-03 RX ORDER — PERMETHRIN 50 MG/G
CREAM TOPICAL ONCE
Qty: 60 G | Refills: 1 | Status: SHIPPED | OUTPATIENT
Start: 2020-02-03 | End: 2020-07-06 | Stop reason: SDUPTHER

## 2020-02-03 NOTE — PROGRESS NOTES
Visit Information    Have you changed or started any medications since your last visit including any over-the-counter medicines, vitamins, or herbal medicines? no   Are you having any side effects from any of your medications? -  no  Have you stopped taking any of your medications? Is so, why? -  no    Have you seen any other physician or provider since your last visit? No  Have you had any other diagnostic tests since your last visit? No  Have you been seen in the emergency room and/or had an admission to a hospital since we last saw you? No  Have you had your routine dental cleaning in the past 6 months? no    Have you activated your [employer] account? If not, what are your barriers?  Yes     No care team member to display    Medical History Review  Past Medical, Family, and Social History reviewed and does not contribute to the patient presenting condition    Health Maintenance   Topic Date Due    Varicella Vaccine (1 of 2 - 2-dose childhood series) 10/20/1991    DTaP/Tdap/Td vaccine (1 - Tdap) 10/20/2001    A1C test (Diabetic or Prediabetic)  10/23/2015    Annual Wellness Visit (AWV)  07/27/2019    Flu vaccine (1) 09/01/2019    Pneumococcal 0-64 years Vaccine  Completed    HIV screen  Completed

## 2020-02-03 NOTE — PROGRESS NOTES
[address] [facility]  [address] [facility] [address]  Dept: [phone]  Dept Fax: [phone]    Hal Quiles is a 34 y.o. male who presents to the urgent care today for his medicalconditions/complaints as noted below. Hal Quiles is c/o of Congestion      HPI:     Mental Health Problem   The primary symptoms include delusions (he states they (doctors) placed a \"brain/computer interface device\" in his brain in January 2019). The current episode started more than 1 month ago. The onset of the illness is precipitated by emotional stress. The degree of incapacity that he is experiencing as a consequence of his illness is severe. He does not admit to suicidal ideas. He does not contemplate harming himself. Risk factors that are present for mental illness include a history of mental illness. States Dr. Myranda Mcleod is the physician who performed the above procedure. He wants to see a neurologist for this \"brain/computer interface. \"    Told he will need his PCP to give him a referral.  Past Medical History:   Diagnosis Date    Anxiety     Arthritis     Asthma     Bipolar I disorder, most recent episode (or current) depressed, unspecified 9/12/2014    Clostridium difficile infection     COPD (chronic obstructive pulmonary disease) ([address]     Depression     Disease of blood and blood forming organ     Eczema     Fracture, metacarpal     R 4th and 5th    Gastric ulcer     Gastritis 06/13/2018    GERD (gastroesophageal reflux disease)     GI bleed     H. 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[address]     Substance abuse ([address]     Suicidal ideation     [facility]     Tobacco dependence     Ulcerative colitis ([facility]     UTI (urinary tract infection)         Current Outpatient Medications   Medication Sig Dispense Refill    promethazine (PHENERGAN) 12.5 MG tablet Take 1 tablet by mouth 2 times daily 20 tablet 0    permethrin (ELIMITE) 5 % cream Apply topically once for 1 dose 60 g 1    tiZANidine (ZANAFLEX) 2 MG tablet Take 1 tablet by mouth 3 times daily as needed (muscle spasm) 20 tablet 0    cephALEXin (KEFLEX) 500 MG capsule       ibuprofen (ADVIL;MOTRIN) 800 MG tablet Take 1 tablet by mouth every 8 hours as needed for Pain 30 tablet 0    docusate sodium (COLACE) 100 MG capsule Take 1 capsule by mouth 2 times daily 20 capsule 0    amoxicillin-clavulanate (AUGMENTIN) 875-125 MG per tablet amoxicillin 875 mg-potassium clavulanate 125 mg tablet   twice a day for 10 days      tiZANidine (ZANAFLEX) 4 MG tablet Take 1 tablet by mouth 3 times daily 30 tablet 0    pantoprazole (PROTONIX) 40 MG tablet Take 1 tablet by mouth every morning (before breakfast) 30 tablet 0    ondansetron (ZOFRAN ODT) 4 MG disintegrating tablet Take 1 tablet by mouth every 8 hours as needed for Nausea 10 tablet 0    naproxen (NAPROSYN) 500 MG tablet Take 1 tablet by mouth 2 times daily 60 tablet 0    albuterol sulfate HFA (VENTOLIN HFA) 108 (90 Base) MCG/ACT inhaler Inhale 2 puffs into the lungs every 6 hours as needed for Wheezing 18 g 0    ketorolac (TORADOL) 10 MG tablet Take 1 tablet by mouth every 6 hours as needed for Pain 20 tablet 0    ARIPiprazole (ABILIFY) 20 MG tablet Take 1 tablet by mouth daily 14 tablet 0    ARIPiprazole lauroxil (ARISTADA) 882 MG/3.2ML PRSY injection Inject 3.2 mLs into the muscle every 30 days 3 mL 0    DULoxetine (CYMBALTA) 60 MG extended release capsule Take 1 capsule by mouth daily 14 capsule 0    melatonin ER 1 MG TBCR tablet Take 1 tablet by mouth nightly as needed (Sleep) 14 tablet 0    traZODone (DESYREL) 50 MG tablet Take 1 tablet by mouth nightly as needed for Sleep 14 tablet 0    Fluticasone Furoate-Vilanterol (BREO ELLIPTA) 200-25 MCG/INH AEPB Inhale 1 puff into the lungs daily 1 each 0    vitamin D (ERGOCALCIFEROL) 32462 units capsule Take 1 capsule by mouth once a week (Patient taking differently: Take 50,000 Units by mouth once a week ) 30 capsule 0    gabapentin (NEURONTIN) 300 MG capsule Take 1 capsule by mouth 3 times daily for 14 days. . 42 capsule 0     No current facility-administered medications for this visit. Allergies   Allergen Reactions    Dicyclomine Anaphylaxis    Famotidine Anaphylaxis    Geodon [Ziprasidone Hcl] Anaphylaxis    Haldol [Haloperidol] Anaphylaxis    Iv Dye [Iodides] Nausea And Vomiting and Rash     Ok to take with benadryl - itching only no rash or anaphylaxis  Needs benadryl.  Bentyl [Dicyclomine Hcl]      08--allergy removed-pt sts today it is his allergy    Flagyl [Metronidazole] Swelling     Patient says he isn't allergic to this med 08-  Today 08- it is an allergy  02/13/19 states he is not allergic        Health Maintenance   Topic Date Due    Varicella Vaccine (1 of 2 - 2-dose childhood series) 10/20/1991    DTaP/Tdap/Td vaccine (1 - Tdap) 10/20/2001    A1C test (Diabetic or Prediabetic)  10/23/2015    Annual Wellness Visit (AWV)  07/27/2019    Flu vaccine (1) 09/01/2019    Pneumococcal 0-64 years Vaccine  Completed    HIV screen  Completed       Subjective:      Review of Systems   All other systems reviewed and are negative. Objective:     Physical Exam  Constitutional:       Appearance: Normal appearance. Neurological:      Mental Status: He is alert. Psychiatric:         Mood and Affect: Affect is blunt. Thought Content: Thought content is delusional.         Cognition and Memory: Cognition is impaired.        BP (!) 158/101 (Site: Right Upper Arm, Position: Sitting, Cuff Size: Medium Adult)   Pulse 119   Temp 97.5 °F (36.4 °C) (Oral)   Wt 227 lb 6.4 oz (103.1 kg)   BMI 33.58 kg/m²       Assessment:       Diagnosis Orders   1. Schizoaffective disorder, bipolar type ([facility]     2. Scabies exposure         Plan:      No follow-ups on file. Orders Placed This Encounter   Medications    promethazine (PHENERGAN) 12.5 MG tablet     Sig: Take 1 tablet by mouth 2 times daily     Dispense:  20 tablet     Refill:  0    permethrin (ELIMITE) 5 % cream     Sig: Apply topically once for 1 dose     Dispense:  60 g     Refill:  1    tiZANidine (ZANAFLEX) 2 MG tablet     Sig: Take 1 tablet by mouth 3 times daily as needed (muscle spasm)     Dispense:  20 tablet     Refill:  0     No orders of the defined types were placed in this encounter. Patient given educational materials - see patient instructions. Discussed use, benefit, and side effects of prescribed medications. All patientquestions answered. Pt voiced understanding.     Electronically signed by Jennie Tafoya MD on 2/3/2020at 5:38 PM

## 2020-07-06 ENCOUNTER — OFFICE VISIT (OUTPATIENT)
Dept: PRIMARY CARE CLINIC | Age: 30
End: 2020-07-06
Payer: MEDICARE

## 2020-07-06 VITALS
OXYGEN SATURATION: 90 % | DIASTOLIC BLOOD PRESSURE: 90 MMHG | BODY MASS INDEX: 35 KG/M2 | SYSTOLIC BLOOD PRESSURE: 134 MMHG | HEART RATE: 82 BPM | WEIGHT: 237 LBS

## 2020-07-06 PROCEDURE — G8427 DOCREV CUR MEDS BY ELIG CLIN: HCPCS | Performed by: NURSE PRACTITIONER

## 2020-07-06 PROCEDURE — G8417 CALC BMI ABV UP PARAM F/U: HCPCS | Performed by: NURSE PRACTITIONER

## 2020-07-06 PROCEDURE — 4004F PT TOBACCO SCREEN RCVD TLK: CPT | Performed by: NURSE PRACTITIONER

## 2020-07-06 PROCEDURE — 99213 OFFICE O/P EST LOW 20 MIN: CPT | Performed by: NURSE PRACTITIONER

## 2020-07-06 RX ORDER — PERMETHRIN 50 MG/G
CREAM TOPICAL ONCE
Qty: 60 G | Refills: 2 | Status: SHIPPED | OUTPATIENT
Start: 2020-07-06 | End: 2020-07-06

## 2020-07-06 NOTE — PROGRESS NOTES
[address] [facility]  [address]  [facility] [address]  Dept: [phone]  Dept Fax: [phone]    Nathalie Waters is a 34 y.o. male who presents to the urgent care today for his medical conditions/complaints as notedbelow. Nathalie Waters is c/o of Medication Refill (Cream)      HPI:     63-year-old male patient presents for rash status post scabies exposure from his roommate. Symptoms started within the week. Has had scabies before. Feels the same. Would like permethrin cream.  Requests refills for the cream as he states usually takes a little longer for it to work for him. No other symptoms. Rash   This is a new problem. The current episode started in the past 7 days. The problem has been gradually worsening since onset. The affected locations include the left arm. The rash is characterized by itchiness. Associated with: scabies. Past treatments include nothing. The treatment provided no relief.        Past Medical History:   Diagnosis Date    Anxiety     Arthritis     Asthma     Bipolar I disorder, most recent episode (or current) depressed, unspecified 9/12/2014    Clostridium difficile infection     COPD (chronic obstructive pulmonary disease) (HCC)     Depression     Disease of blood and blood forming organ     Eczema     Fracture, metacarpal     R 4th and 5th    Gastric ulcer     Gastritis 06/13/2018    GERD (gastroesophageal reflux disease)     GI bleed     H. 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[address]     Substance abuse ([address]     Suicidal ideation     Suicide attempt by hanging ([address]     Tobacco dependence     Ulcerative colitis ([address]  UTI (urinary tract infection)         Current Outpatient Medications   Medication Sig Dispense Refill    permethrin (ELIMITE) 5 % cream Apply topically once for 1 dose 60 g 2    promethazine (PHENERGAN) 12.5 MG tablet Take 1 tablet by mouth 2 times daily 20 tablet 0    tiZANidine (ZANAFLEX) 2 MG tablet Take 1 tablet by mouth 3 times daily as needed (muscle spasm) 20 tablet 0    ibuprofen (ADVIL;MOTRIN) 800 MG tablet Take 1 tablet by mouth every 8 hours as needed for Pain 30 tablet 0    docusate sodium (COLACE) 100 MG capsule Take 1 capsule by mouth 2 times daily 20 capsule 0    pantoprazole (PROTONIX) 40 MG tablet Take 1 tablet by mouth every morning (before breakfast) 30 tablet 0    ondansetron (ZOFRAN ODT) 4 MG disintegrating tablet Take 1 tablet by mouth every 8 hours as needed for Nausea 10 tablet 0    naproxen (NAPROSYN) 500 MG tablet Take 1 tablet by mouth 2 times daily 60 tablet 0    albuterol sulfate HFA (VENTOLIN HFA) 108 (90 Base) MCG/ACT inhaler Inhale 2 puffs into the lungs every 6 hours as needed for Wheezing 18 g 0    ketorolac (TORADOL) 10 MG tablet Take 1 tablet by mouth every 6 hours as needed for Pain 20 tablet 0    ARIPiprazole (ABILIFY) 20 MG tablet Take 1 tablet by mouth daily 14 tablet 0    ARIPiprazole lauroxil (ARISTADA) 882 MG/3.2ML PRSY injection Inject 3.2 mLs into the muscle every 30 days 3 mL 0    DULoxetine (CYMBALTA) 60 MG extended release capsule Take 1 capsule by mouth daily 14 capsule 0    gabapentin (NEURONTIN) 300 MG capsule Take 1 capsule by mouth 3 times daily for 14 days. . 42 capsule 0    melatonin ER 1 MG TBCR tablet Take 1 tablet by mouth nightly as needed (Sleep) 14 tablet 0    traZODone (DESYREL) 50 MG tablet Take 1 tablet by mouth nightly as needed for Sleep 14 tablet 0    Fluticasone Furoate-Vilanterol (BREO ELLIPTA) 200-25 MCG/INH AEPB Inhale 1 puff into the lungs daily 1 each 0    vitamin D (ERGOCALCIFEROL) 64215 units capsule Take 1 capsule by mouth once a week (Patient taking differently: Take 50,000 Units by mouth once a week ) 30 capsule 0    cephALEXin (KEFLEX) 500 MG capsule       amoxicillin-clavulanate (AUGMENTIN) 875-125 MG per tablet amoxicillin 875 mg-potassium clavulanate 125 mg tablet   twice a day for 10 days      tiZANidine (ZANAFLEX) 4 MG tablet Take 1 tablet by mouth 3 times daily 30 tablet 0     No current facility-administered medications for this visit. Allergies   Allergen Reactions    Dicyclomine Anaphylaxis    Famotidine Anaphylaxis    Geodon [Ziprasidone Hcl] Anaphylaxis    Haldol [Haloperidol] Anaphylaxis    Iv Dye [Iodides] Nausea And Vomiting and Rash     Ok to take with benadryl - itching only no rash or anaphylaxis  Needs benadryl.  Bentyl [Dicyclomine Hcl]      08--allergy removed-pt sts today it is his allergy    Flagyl [Metronidazole] Swelling     Patient says he isn't allergic to this med 08-  Today 08- it is an allergy  02/13/19 states he is not allergic        Subjective:      Review of Systems   Skin: Positive for rash. All other systems reviewed and are negative. 14 systems reviewed and negative except as listed in HPI. Objective:     Physical Exam  Vitals signs and nursing note reviewed. Constitutional:       General: He is not in acute distress. Appearance: Normal appearance. He is not ill-appearing, toxic-appearing or diaphoretic. HENT:      Head: Normocephalic and atraumatic. Right Ear: External ear normal.      Left Ear: External ear normal.   Eyes:      General:         Right eye: No discharge. Left eye: No discharge. Conjunctiva/sclera: Conjunctivae normal.      Pupils: Pupils are equal, round, and reactive to light. Neck:      Musculoskeletal: Neck supple. Cardiovascular:      Rate and Rhythm: Normal rate. Pulmonary:      Effort: Pulmonary effort is normal.   Musculoskeletal: Normal range of motion. General: No signs of injury. Comments: Ambulated to and from room, gait is steady, moving all extremities without difficulty   Skin:     General: Skin is warm and dry. Capillary Refill: Capillary refill takes less than 2 seconds. Findings: Rash present. Comments: Papular moderately pruritic rash to left hand and forearm. No vesicles or pustules. Some burrows noted. Neurological:      General: No focal deficit present. Mental Status: He is alert and oriented to person, place, and time. Psychiatric:         Mood and Affect: Mood normal.         Behavior: Behavior normal.       BP (!) 134/90   Pulse 82   Wt 237 lb (107.5 kg)   SpO2 90%   BMI 35.00 kg/m²     Assessment:       Diagnosis Orders   1. Scabies exposure         Plan:   Return if symptoms worsen or fail to improve, for Make an Appt. with your Primary Care in 1 week. Known scabies exposure from roommate  Rash consistent with scabies  Elimite cream prescription with refills  Return worse  Orders Placed This Encounter   Medications    permethrin (ELIMITE) 5 % cream     Sig: Apply topically once for 1 dose     Dispense:  60 g     Refill:  2         Patient given educational materials - see patient instructions. Discussed use, benefit, and side effects of prescribed medications. All patient questions answered. Pt voicedunderstanding.     Electronically signed by MASSIEL Matute CNP on 7/6/2020 at 10:30 AM

## 2020-07-06 NOTE — PROGRESS NOTES
Visit Information    Have you changed or started any medications since your last visit including any over-the-counter medicines, vitamins, or herbal medicines? no   Are you having any side effects from any of your medications? -  no  Have you stopped taking any of your medications? Is so, why? -  no    Have you seen any other physician or provider since your last visit? No  Have you had any other diagnostic tests since your last visit? No  Have you been seen in the emergency room and/or had an admission to a hospital since we last saw you? No  Have you had your routine dental cleaning in the past 6 months? no    Have you activated your [employer] account? If not, what are your barriers?  Yes     No care team member to display    Medical History Review  Past Medical, Family, and Social History reviewed and does contribute to the patient presenting condition    Health Maintenance   Topic Date Due    Varicella vaccine (1 of 2 - 2-dose childhood series) 10/20/1991    DTaP/Tdap/Td vaccine (1 - Tdap) 10/20/2009    A1C test (Diabetic or Prediabetic)  10/23/2015    Annual Wellness Visit (AWV)  07/27/2019    Flu vaccine (1) 09/01/2020    Pneumococcal 0-64 years Vaccine  Completed    HIV screen  Completed    Hepatitis A vaccine  Aged Out    Hepatitis B vaccine  Aged Out    Hib vaccine  Aged Out    Meningococcal (ACWY) vaccine  Aged Out

## 2020-07-06 NOTE — PATIENT INSTRUCTIONS
Return worse    Patient Education        Scabies: Care Instructions  Your Care Instructions  Scabies is a skin problem that can cause intense itching. It is caused by very tiny bugs called mites that dig just under the skin and lay eggs. An allergic reaction to the mites causes the itching. Scabies is usually spread by person-to-person contact. It is also possible, but not common, for scabies to spread through towels, clothes, and bedding. Everyone in your household should be treated. Scabies is treated with medicine. Itching may last for several weeks after treatment. Follow-up care is a key part of your treatment and safety. Be sure to make and go to all appointments, and call your doctor if you are having problems. It's also a good idea to know your test results and keep a list of the medicines you take. How can you care for yourself at home? · Use the lotion or cream your doctor recommends or prescribes. One treatment usually cures scabies. Do not use the cream again unless your doctor tells you to. · Wash all clothes, bedding, and towels that you used in the 3 days before you started treatment. Use hot water, and use the hot cycle in the dryer. Another option is to dry-clean these items. Or seal them in a plastic bag for 3 to 7 days. · Take an oral antihistamine, such as loratadine (Claritin) or diphenhydramine (Benadryl), to help stop itching. You also can use a nonprescription anti-itch cream. Read and follow all instructions on the label. · Do not have physical contact with other people or let anyone use your personal items until you have finished treatment. Do not use other people's personal items until your treatment is done. Tell people with whom you have close contact that they will need treatment if they have symptoms. · Take an oatmeal bath to help relieve itching. Add a handful of oatmeal (ground to a powder) to your bath. Or you can try an oatmeal bath product, such as Aveeno.   When should you call for help? Call your doctor now or seek immediate medical care if:  · You have signs of infection, such as:  ? Increased pain, swelling, warmth, or redness. ? Red streaks leading from the mite bites. ? Pus draining from a bite area. ? A fever. Watch closely for changes in your health, and be sure to contact your doctor if:  · Anyone else in your family has itching. · You do not get better within 2 weeks. Where can you learn more? Go to https://[email or web address].com.[employer]. org and sign in to your [employer] account. Enter E184 in the [facility] box to learn more about \"Scabies: Care Instructions. \"     If you do not have an account, please click on the \"Sign Up Now\" link. Current as of: October 31, 2019               Content Version: 12.5  © 8345-8535 Healthwise, Incorporated. Care instructions adapted under license by [facility] ([facility]). If you have questions about a medical condition or this instruction, always ask your healthcare professional. [address] any warranty or liability for your use of this information.

## 2020-08-12 ENCOUNTER — OFFICE VISIT (OUTPATIENT)
Dept: PRIMARY CARE CLINIC | Age: 30
End: 2020-08-12
Payer: COMMERCIAL

## 2020-08-12 VITALS
HEIGHT: 68 IN | DIASTOLIC BLOOD PRESSURE: 87 MMHG | HEART RATE: 96 BPM | SYSTOLIC BLOOD PRESSURE: 144 MMHG | WEIGHT: 230 LBS | BODY MASS INDEX: 34.86 KG/M2 | TEMPERATURE: 98.8 F

## 2020-08-12 PROCEDURE — G8427 DOCREV CUR MEDS BY ELIG CLIN: HCPCS | Performed by: INTERNAL MEDICINE

## 2020-08-12 PROCEDURE — 4004F PT TOBACCO SCREEN RCVD TLK: CPT | Performed by: INTERNAL MEDICINE

## 2020-08-12 PROCEDURE — 99213 OFFICE O/P EST LOW 20 MIN: CPT | Performed by: INTERNAL MEDICINE

## 2020-08-12 PROCEDURE — G8417 CALC BMI ABV UP PARAM F/U: HCPCS | Performed by: INTERNAL MEDICINE

## 2020-08-12 RX ORDER — GABAPENTIN 600 MG/1
600 TABLET ORAL 3 TIMES DAILY
Qty: 45 TABLET | Refills: 0 | Status: SHIPPED | OUTPATIENT
Start: 2020-08-12 | End: 2021-01-26 | Stop reason: ALTCHOICE

## 2020-08-12 RX ORDER — PERMETHRIN 50 MG/G
CREAM TOPICAL
Qty: 1 TUBE | Refills: 1 | Status: SHIPPED | OUTPATIENT
Start: 2020-08-12 | End: 2021-01-26 | Stop reason: ALTCHOICE

## 2020-08-12 NOTE — PROGRESS NOTES
in one week. 1 Tube 1    gabapentin (NEURONTIN) 600 MG tablet Take 1 tablet by mouth 3 times daily for 15 days.  45 tablet 0    tiZANidine (ZANAFLEX) 2 MG tablet Take 1 tablet by mouth 3 times daily as needed (muscle spasm) 20 tablet 0    ibuprofen (ADVIL;MOTRIN) 800 MG tablet Take 1 tablet by mouth every 8 hours as needed for Pain (Patient taking differently: Take 800 mg by mouth every 8 hours as needed for Pain ) 30 tablet 0    docusate sodium (COLACE) 100 MG capsule Take 1 capsule by mouth 2 times daily 20 capsule 0    pantoprazole (PROTONIX) 40 MG tablet Take 1 tablet by mouth every morning (before breakfast) 30 tablet 0    naproxen (NAPROSYN) 500 MG tablet Take 1 tablet by mouth 2 times daily 60 tablet 0    albuterol sulfate HFA (VENTOLIN HFA) 108 (90 Base) MCG/ACT inhaler Inhale 2 puffs into the lungs every 6 hours as needed for Wheezing (Patient taking differently: Inhale 2 puffs into the lungs every 6 hours as needed for Wheezing ) 18 g 0    ketorolac (TORADOL) 10 MG tablet Take 1 tablet by mouth every 6 hours as needed for Pain (Patient taking differently: Take 10 mg by mouth every 6 hours as needed for Pain ) 20 tablet 0    ARIPiprazole (ABILIFY) 20 MG tablet Take 1 tablet by mouth daily 14 tablet 0    ARIPiprazole lauroxil (ARISTADA) 882 MG/3.2ML PRSY injection Inject 3.2 mLs into the muscle every 30 days 3 mL 0    DULoxetine (CYMBALTA) 60 MG extended release capsule Take 1 capsule by mouth daily 14 capsule 0    melatonin ER 1 MG TBCR tablet Take 1 tablet by mouth nightly as needed (Sleep) 14 tablet 0    traZODone (DESYREL) 50 MG tablet Take 1 tablet by mouth nightly as needed for Sleep 14 tablet 0    Fluticasone Furoate-Vilanterol (BREO ELLIPTA) 200-25 MCG/INH AEPB Inhale 1 puff into the lungs daily 1 each 0    vitamin D (ERGOCALCIFEROL) 80138 units capsule Take 1 capsule by mouth once a week (Patient taking differently: Take 50,000 Units by mouth once a week ) 30 capsule 0    promethazine negative. Objective:     Physical Exam  Vitals signs reviewed. Constitutional:       Appearance: Normal appearance. HENT:      Head: Normocephalic and atraumatic. Skin:     General: Skin is warm and dry. Neurological:      General: No focal deficit present. Mental Status: He is alert and oriented to person, place, and time. Psychiatric:         Mood and Affect: Mood normal.         Behavior: Behavior normal.       BP (!) 144/87   Pulse 96   Temp 98.8 °F (37.1 °C) (Oral)   Ht 5' 8\" (1.727 m)   Wt 230 lb (104.3 kg)   BMI 34.97 kg/m²       Assessment:       Diagnosis Orders   1. Scabies  permethrin (ELIMITE) 5 % cream       Plan:      No follow-ups on file. Orders Placed This Encounter   Medications    permethrin (ELIMITE) 5 % cream     Sig: Apply topically as directed repeat in one week. Dispense:  1 Tube     Refill:  1    gabapentin (NEURONTIN) 600 MG tablet     Sig: Take 1 tablet by mouth 3 times daily for 15 days. Dispense:  45 tablet     Refill:  0     No orders of the defined types were placed in this encounter. Patient given educational materials - see patient instructions. Discussed use, benefit, and side effects of prescribed medications. All patientquestions answered. Pt voiced understanding.     Electronically signed by Denise Burgess MD on 8/12/2020at 3:53 PM

## 2020-09-19 ENCOUNTER — APPOINTMENT (OUTPATIENT)
Dept: GENERAL RADIOLOGY | Age: 30
End: 2020-09-19
Payer: COMMERCIAL

## 2020-09-19 ENCOUNTER — HOSPITAL ENCOUNTER (EMERGENCY)
Age: 30
Discharge: HOME | End: 2020-09-19
Attending: EMERGENCY MEDICINE
Payer: COMMERCIAL

## 2020-09-19 VITALS
HEART RATE: 85 BPM | RESPIRATION RATE: 17 BRPM | OXYGEN SATURATION: 99 % | WEIGHT: 230 LBS | SYSTOLIC BLOOD PRESSURE: 141 MMHG | BODY MASS INDEX: 34.97 KG/M2 | TEMPERATURE: 97.8 F | DIASTOLIC BLOOD PRESSURE: 95 MMHG

## 2020-09-19 LAB
-: NORMAL
ABSOLUTE EOS #: 0.28 K/UL (ref 0–0.44)
ABSOLUTE IMMATURE GRANULOCYTE: <0.03 K/UL (ref 0–0.3)
ABSOLUTE LYMPH #: 1.69 K/UL (ref 1.1–3.7)
ABSOLUTE MONO #: 0.56 K/UL (ref 0.1–1.2)
ALBUMIN SERPL-MCNC: 3.6 G/DL (ref 3.5–5.2)
ALBUMIN/GLOBULIN RATIO: 0.9 (ref 1–2.5)
ALP BLD-CCNC: 52 U/L (ref 40–129)
ALT SERPL-CCNC: 23 U/L (ref 5–41)
ANION GAP SERPL CALCULATED.3IONS-SCNC: 7 MMOL/L (ref 9–17)
AST SERPL-CCNC: 44 U/L
BASOPHILS # BLD: 1 % (ref 0–2)
BASOPHILS ABSOLUTE: 0.04 K/UL (ref 0–0.2)
BILIRUB SERPL-MCNC: 0.2 MG/DL (ref 0.3–1.2)
BILIRUBIN DIRECT: <0.08 MG/DL
BILIRUBIN, INDIRECT: ABNORMAL MG/DL (ref 0–1)
BUN BLDV-MCNC: 11 MG/DL (ref 6–20)
BUN/CREAT BLD: ABNORMAL (ref 9–20)
CALCIUM SERPL-MCNC: 8.8 MG/DL (ref 8.6–10.4)
CHLORIDE BLD-SCNC: 100 MMOL/L (ref 98–107)
CO2: 26 MMOL/L (ref 20–31)
CREAT SERPL-MCNC: 0.66 MG/DL (ref 0.7–1.2)
DIFFERENTIAL TYPE: ABNORMAL
EOSINOPHILS RELATIVE PERCENT: 4 % (ref 1–4)
GFR AFRICAN AMERICAN: >60 ML/MIN
GFR NON-AFRICAN AMERICAN: >60 ML/MIN
GFR SERPL CREATININE-BSD FRML MDRD: ABNORMAL ML/MIN/{1.73_M2}
GFR SERPL CREATININE-BSD FRML MDRD: ABNORMAL ML/MIN/{1.73_M2}
GLOBULIN: ABNORMAL G/DL (ref 1.5–3.8)
GLUCOSE BLD-MCNC: 122 MG/DL (ref 70–99)
HCT VFR BLD CALC: 45.5 % (ref 40.7–50.3)
HEMOGLOBIN: 14.8 G/DL (ref 13–17)
IMMATURE GRANULOCYTES: 0 %
LIPASE: 18 U/L (ref 13–60)
LYMPHOCYTES # BLD: 23 % (ref 24–43)
MCH RBC QN AUTO: 29.2 PG (ref 25.2–33.5)
MCHC RBC AUTO-ENTMCNC: 32.5 G/DL (ref 28.4–34.8)
MCV RBC AUTO: 89.9 FL (ref 82.6–102.9)
MONOCYTES # BLD: 8 % (ref 3–12)
MYOGLOBIN: 23 NG/ML (ref 28–72)
NRBC AUTOMATED: 0 PER 100 WBC
PDW BLD-RTO: 13.3 % (ref 11.8–14.4)
PLATELET # BLD: 349 K/UL (ref 138–453)
PLATELET ESTIMATE: ABNORMAL
PMV BLD AUTO: 10.4 FL (ref 8.1–13.5)
POTASSIUM SERPL-SCNC: 5.2 MMOL/L (ref 3.7–5.3)
RBC # BLD: 5.06 M/UL (ref 4.21–5.77)
RBC # BLD: ABNORMAL 10*6/UL
REASON FOR REJECTION: NORMAL
SEG NEUTROPHILS: 64 % (ref 36–65)
SEGMENTED NEUTROPHILS ABSOLUTE COUNT: 4.88 K/UL (ref 1.5–8.1)
SODIUM BLD-SCNC: 133 MMOL/L (ref 135–144)
TOTAL CK: 347 U/L (ref 39–308)
TOTAL PROTEIN: 7.4 G/DL (ref 6.4–8.3)
TROPONIN INTERP: NORMAL
TROPONIN T: NORMAL NG/ML
TROPONIN, HIGH SENSITIVITY: <6 NG/L (ref 0–22)
WBC # BLD: 7.5 K/UL (ref 3.5–11.3)
WBC # BLD: ABNORMAL 10*3/UL
ZZ NTE CLEAN UP: ORDERED TEST: NORMAL
ZZ NTE WITH NAME CLEAN UP: SPECIMEN SOURCE: NORMAL

## 2020-09-19 PROCEDURE — 99284 EMERGENCY DEPT VISIT MOD MDM: CPT

## 2020-09-19 PROCEDURE — 85025 COMPLETE CBC W/AUTO DIFF WBC: CPT

## 2020-09-19 PROCEDURE — 6370000000 HC RX 637 (ALT 250 FOR IP): Performed by: EMERGENCY MEDICINE

## 2020-09-19 PROCEDURE — 71045 X-RAY EXAM CHEST 1 VIEW: CPT

## 2020-09-19 PROCEDURE — 80048 BASIC METABOLIC PNL TOTAL CA: CPT

## 2020-09-19 PROCEDURE — 96374 THER/PROPH/DIAG INJ IV PUSH: CPT

## 2020-09-19 PROCEDURE — 6360000002 HC RX W HCPCS: Performed by: EMERGENCY MEDICINE

## 2020-09-19 PROCEDURE — 84484 ASSAY OF TROPONIN QUANT: CPT

## 2020-09-19 PROCEDURE — 80076 HEPATIC FUNCTION PANEL: CPT

## 2020-09-19 PROCEDURE — 93005 ELECTROCARDIOGRAM TRACING: CPT | Performed by: EMERGENCY MEDICINE

## 2020-09-19 PROCEDURE — 83874 ASSAY OF MYOGLOBIN: CPT

## 2020-09-19 PROCEDURE — 2580000003 HC RX 258: Performed by: EMERGENCY MEDICINE

## 2020-09-19 PROCEDURE — 83690 ASSAY OF LIPASE: CPT

## 2020-09-19 PROCEDURE — 82550 ASSAY OF CK (CPK): CPT

## 2020-09-19 PROCEDURE — 96372 THER/PROPH/DIAG INJ SC/IM: CPT

## 2020-09-19 PROCEDURE — 96375 TX/PRO/DX INJ NEW DRUG ADDON: CPT

## 2020-09-19 RX ORDER — 0.9 % SODIUM CHLORIDE 0.9 %
1000 INTRAVENOUS SOLUTION INTRAVENOUS ONCE
Status: COMPLETED | OUTPATIENT
Start: 2020-09-19 | End: 2020-09-19

## 2020-09-19 RX ORDER — KETOROLAC TROMETHAMINE 15 MG/ML
15 INJECTION, SOLUTION INTRAMUSCULAR; INTRAVENOUS ONCE
Status: COMPLETED | OUTPATIENT
Start: 2020-09-19 | End: 2020-09-19

## 2020-09-19 RX ORDER — ONDANSETRON 2 MG/ML
4 INJECTION INTRAMUSCULAR; INTRAVENOUS ONCE
Status: COMPLETED | OUTPATIENT
Start: 2020-09-19 | End: 2020-09-19

## 2020-09-19 RX ORDER — ACETAMINOPHEN 500 MG
500 TABLET ORAL EVERY 4 HOURS PRN
Qty: 20 TABLET | Refills: 0 | Status: ON HOLD | OUTPATIENT
Start: 2020-09-19 | End: 2021-06-28

## 2020-09-19 RX ORDER — ONDANSETRON 4 MG/1
4 TABLET, FILM COATED ORAL ONCE
Status: COMPLETED | OUTPATIENT
Start: 2020-09-19 | End: 2020-09-19

## 2020-09-19 RX ORDER — MELOXICAM 7.5 MG/1
7.5 TABLET ORAL DAILY
Qty: 30 TABLET | Refills: 0 | Status: ON HOLD | OUTPATIENT
Start: 2020-09-19 | End: 2021-06-28 | Stop reason: DRUGHIGH

## 2020-09-19 RX ORDER — DIPHENHYDRAMINE HYDROCHLORIDE 50 MG/ML
25 INJECTION INTRAMUSCULAR; INTRAVENOUS EVERY 6 HOURS PRN
Status: DISCONTINUED | OUTPATIENT
Start: 2020-09-19 | End: 2020-09-19 | Stop reason: HOSPADM

## 2020-09-19 RX ORDER — ACETAMINOPHEN 500 MG
1000 TABLET ORAL ONCE
Status: COMPLETED | OUTPATIENT
Start: 2020-09-19 | End: 2020-09-19

## 2020-09-19 RX ORDER — DIPHENHYDRAMINE HCL 25 MG
25 TABLET ORAL ONCE
Status: COMPLETED | OUTPATIENT
Start: 2020-09-19 | End: 2020-09-19

## 2020-09-19 RX ADMIN — DIPHENHYDRAMINE HCL 25 MG: 25 TABLET ORAL at 11:35

## 2020-09-19 RX ADMIN — ACETAMINOPHEN 1000 MG: 500 TABLET ORAL at 10:34

## 2020-09-19 RX ADMIN — KETOROLAC TROMETHAMINE 15 MG: 15 INJECTION, SOLUTION INTRAMUSCULAR; INTRAVENOUS at 11:35

## 2020-09-19 RX ADMIN — SODIUM CHLORIDE 1000 ML: 9 INJECTION, SOLUTION INTRAVENOUS at 10:34

## 2020-09-19 RX ADMIN — Medication 25 MG: at 14:02

## 2020-09-19 RX ADMIN — ONDANSETRON HYDROCHLORIDE 4 MG: 4 TABLET, FILM COATED ORAL at 13:57

## 2020-09-19 RX ADMIN — ONDANSETRON 4 MG: 2 INJECTION INTRAMUSCULAR; INTRAVENOUS at 10:34

## 2020-09-19 ASSESSMENT — ENCOUNTER SYMPTOMS
APNEA: 0
VOMITING: 0
BACK PAIN: 0
CHEST TIGHTNESS: 0
SHORTNESS OF BREATH: 0
NAUSEA: 0
PHOTOPHOBIA: 0
ABDOMINAL PAIN: 0
ABDOMINAL DISTENTION: 0
COLOR CHANGE: 0
CONSTIPATION: 0
FACIAL SWELLING: 0
DIARRHEA: 0

## 2020-09-19 ASSESSMENT — PAIN SCALES - GENERAL: PAINLEVEL_OUTOF10: 8

## 2020-09-19 NOTE — ED PROVIDER NOTES
[facility]     Emergency Department     Faculty Attestation        I performed a history and physical examination of the patient and discussed management with the resident. I reviewed the residents note and agree with the documented findings and plan of care. Any areas of disagreement are noted on the chart. I was personally present for the key portions of any procedures. I have documented in the chart those procedures where I was not present during the key portions. I have reviewed the emergency nurses triage note. I agree with the chief complaint, past medical history, past surgical history, allergies, medications, social and family history as documented unless otherwise noted below. For Physician Assistant/ Nurse Practitioner cases/documentation I have personally evaluated this patient and have completed at least one if not all key elements of the E/M (history, physical exam, and MDM). Additional findings are as noted. Vital Signs: BP: (!) 141/95  Pulse: 85  Resp: 17    SpO2: 99 %  PCP:  No primary care provider on file. Pertinent Comments:         EKG Interpretation    Interpreted by me    Rhythm: normal sinus   Rate: normal at 84 bpm  Axis: normal  Ectopy: none  Conduction: normal  ST Segments: no acute change  T Waves: no acute change  Q Waves: none    Clinical Impression: no acute changes and normal EKG    Critical Care  None    This patient was evaluated in the Emergency Department for symptoms described in the history of present illness. He/she was evaluated in the context of the global COVID-19 pandemic, which necessitated consideration that the patient might be at risk for infection with the SARS-CoV-2 virus that causes COVID-19.  Institutional protocols and algorithms that pertain to the evaluation of patients at risk for COVID-19 are in a state of rapid change based on information released by regulatory bodies including the CDC and federal and state organizations. These policies and algorithms were followed during the patient's care in the ED. (Please note that portions of this note were completed with a voice recognition program. Efforts were made to edit the dictations but occasionally words are mis-transcribed.  Whenever words are used in this note in any gender, they shall be construed as though they were used in the gender appropriate to the circumstances; and whenever words are used in this note in the singular or plural form, they shall be construed as though they were used in the form appropriate to the circumstances.)    MD Amber Kelsey  Attending Emergency Medicine Physician            Melissa Enriquez MD  09/19/20 1008       Melissa Enriquez MD  09/19/20 102

## 2020-09-19 NOTE — ED PROVIDER NOTES
[facility] ED  Emergency Department Encounter  EmergencyMedicine Resident     Pt Elvia Robin Beth Lal  MRN: [id number]  [place] 1990  Date of evaluation: 9/19/20  PCP:  No primary care provider on file. CHIEF COMPLAINT       Chief Complaint   Patient presents with    Generalized Body Aches       HISTORY OF PRESENT ILLNESS  (Location/Symptom, Timing/Onset, Context/Setting, Quality, Duration, Modifying Factors, Severity.)      Dutch Leigh is a 34 y.o. male who presents with diffuse myalgias. Patient states that he is on body aches x2 days. Also states he has had associated nausea and vomiting. States that his legs and arms ache with movement, describes as muscular pain. States that they also hurt in her wounds, did not move from one spot to another. Denies any neck pain or pain with flexion. Also has a little chest pain, headache, and abdominal pain. Rates his pain 9 out of 10. His muscles do not hurt with talking, nor do they hurt with eye movement. Denies any bowel changes. Denies any urinary changes. PAST MEDICAL / SURGICAL / SOCIAL / FAMILY HISTORY      has a past medical history of Anxiety, Arthritis, Asthma, Bipolar I disorder, most recent episode (or current) depressed, unspecified, Clostridium difficile infection, COPD (chronic obstructive pulmonary disease) ([address] Depression, Disease of blood and blood forming organ, Eczema, Fracture, metacarpal, Gastric ulcer, Gastritis, GERD (gastroesophageal reflux disease), GI bleed, H. pylori infection, H/O blood clots, Head injury, Headache, Insomnia, Juvenile rheumatoid arthritis ([address] Neuromuscular disorder ([address] PFAPA syndrome ([address] PUD (peptic ulcer disease), Rheumatoid arthritis ([address] Rheumatoid arthritis(714.0), Sleep apnea, 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[address] Substance abuse ([address] Suicidal ideation, Suicide attempt by hanging ([address] Tobacco dependence, Ulcerative colitis ([address] and UTI (urinary tract infection).   Extensive psych history and abdominal history are noted, no additional medical history to add     has a past surgical history that includes Colonoscopy; bronchoscopy; other surgical history; Upper gastrointestinal endoscopy (2/4/16); pr egd transoral biopsy single/multiple (N/A, 3/20/2017); sigmoidoscopy (N/A, 3/20/2017); Cholecystectomy, laparoscopic (07/14/2017); pr esophagogastroduodenoscopy transoral diagnostic (N/A, 8/9/2017); pr colonoscopy w/biopsy single/multiple (8/9/2017); Abdomen surgery; Upper gastrointestinal endoscopy (N/A, 6/13/2018); and Upper gastrointestinal endoscopy (N/A, 9/20/2018). No additional surgical history    Social History     Socioeconomic History    Marital status: Single     Spouse name: Not on file    Number of children: Not on file    Years of education: Not on file    Highest education level: Not on file   Occupational History    Occupation: disability   Social Needs    Financial resource strain: Not on file    Food insecurity     Worry: Not on file     Inability: Not on file   [employer] needs     Medical: Not on file     Non-medical: Not on file   Tobacco Use    Smoking status: Current Every Day Smoker     Packs/day: 0.50     Years: 5.00     Pack years: 2.50     Types: E-Cigarettes, Cigarettes, Cigars    Smokeless tobacco: Never Used    Tobacco comment: states he smokes 3 cigars a day   Substance and Sexual Activity    Alcohol use: Yes     Alcohol/week: 0.0 standard drinks     Comment: socially    Drug use: No     Comment: on methadone, h/o of drug use    Sexual activity: Yes     Partners: Female     Comment: Lives alone, not working.    Lifestyle    Physical activity     Days per week: Not on file     Minutes per session: Not on file    Stress: Not on file   Relationships    Social connections     Talks on phone: Not on file     Gets together: Not on file     Attends Christianity service: Not on file     Active member of club or organization: Not on file     Attends meetings of clubs or organizations: Not on file     Relationship status: Not on file    Intimate partner violence     Fear of current or ex partner: Not on file     Emotionally abused: Not on file     Physically abused: Not on file     Forced sexual activity: Not on file   Other Topics Concern    Not on file   Social History Narrative    ** Merged History Encounter **            Family History   Problem Relation Age of Onset    Diabetes Father     Alcohol Abuse Father     Depression Father     Arthritis Father     High Blood Pressure Father     Other Father         aneurysm & epilepsy    Migraines Father     Arthritis Mother     Other Mother         aneurysm & epilepsy    Migraines Mother     Diabetes Brother         Aunt and uncles    Depression Brother     Mental Illness Brother     Other Brother         epilepsy    Migraines Brother     Stroke Other         Uncle    Other Brother         murdered Oct 6th, 2014    Colon Cancer Paternal Cousin 43    Other Sister         epilepsy   Jose Aneta Migraines Sister        Allergies:  Dicyclomine; Famotidine; Geodon [ziprasidone hcl]; Haldol [haloperidol]; Iv dye [iodides]; Bentyl [dicyclomine hcl]; and Flagyl [metronidazole]    Home Medications:  Prior to Admission medications    Medication Sig Start Date End Date Taking? Authorizing Provider   meloxicam (MOBIC) 7.5 MG tablet Take 1 tablet by mouth daily 9/19/20  Yes Harjinder Pinon DO   acetaminophen (TYLENOL) 500 MG tablet Take 1 tablet by mouth every 4 hours as needed for Pain 9/19/20  Yes Harjinder Pinon DO   permethrin (ELIMITE) 5 % cream Apply topically as directed repeat in one week. 8/12/20   Driss Klein MD   gabapentin (NEURONTIN) 600 MG tablet Take 1 tablet by mouth 3 times daily for 15 days.  8/12/20 8/27/20  Driss Klein MD   promethazine (PHENERGAN) 12.5 MG tablet Take 1 tablet by mouth 2 times daily  Patient not taking: Reported on 8/12/2020 2/3/20   Driss Klein by mouth daily 2/28/19   MASSIEL Gongora CNP   gabapentin (NEURONTIN) 300 MG capsule Take 1 capsule by mouth 3 times daily for 14 days. . 2/27/19 7/6/20  MASSIEL Gongora CNP   melatonin ER 1 MG TBCR tablet Take 1 tablet by mouth nightly as needed (Sleep) 2/27/19   MASSIEL Gongora CNP   traZODone (DESYREL) 50 MG tablet Take 1 tablet by mouth nightly as needed for Sleep 2/27/19   MASSIEL Gongora CNP   Fluticasone Furoate-Vilanterol (BREO ELLIPTA) 200-25 MCG/INH AEPB Inhale 1 puff into the lungs daily 10/1/18   Placido Miner MD   vitamin D (ERGOCALCIFEROL) 08540 units capsule Take 1 capsule by mouth once a week  Patient taking differently: Take 50,000 Units by mouth once a week  10/6/18   Placido Miner MD       REVIEW OF SYSTEMS    (2-9 systems for level 4, 10 or more for level 5)      Review of Systems   Constitutional: Positive for chills. Negative for fatigue and fever. HENT: Negative for congestion and facial swelling. Eyes: Negative for photophobia and visual disturbance. Respiratory: Negative for apnea, chest tightness and shortness of breath. Cardiovascular: Positive for chest pain. Negative for leg swelling. Gastrointestinal: Negative for abdominal distention, abdominal pain, constipation, diarrhea, nausea and vomiting. Endocrine: Negative for polyuria. Genitourinary: Negative for difficulty urinating and dysuria. Musculoskeletal: Positive for myalgias. Negative for arthralgias, back pain, neck pain and neck stiffness. Skin: Negative for color change. Neurological: Positive for weakness and headaches. Negative for dizziness, facial asymmetry, light-headedness and numbness. Psychiatric/Behavioral: Negative for agitation, behavioral problems, confusion and suicidal ideas.        PHYSICAL EXAM   (up to 7 for level 4, 8 or more for level 5)      INITIAL VITALS:   BP (!) 141/95   Pulse 85   Temp 97.8 °F (36.6 °C) (Oral)   Resp 17 Wt 230 lb (104.3 kg)   SpO2 99%   BMI 34.97 kg/m²     Physical Exam  Vitals signs and nursing note reviewed. Constitutional:       Appearance: Normal appearance. HENT:      Head: Normocephalic and atraumatic. Mouth/Throat:      Mouth: Mucous membranes are moist.      Pharynx: Oropharynx is clear. Eyes:      Extraocular Movements: Extraocular movements intact. Conjunctiva/sclera: Conjunctivae normal.      Pupils: Pupils are equal, round, and reactive to light. Comments: Fatigable lateral nystagmus, particularly to the left. Neck:      Musculoskeletal: Normal range of motion and neck supple. Cardiovascular:      Rate and Rhythm: Normal rate and regular rhythm. Pulses: Normal pulses. Heart sounds: Normal heart sounds. No murmur. Pulmonary:      Effort: Pulmonary effort is normal.      Breath sounds: Normal breath sounds. No stridor. No wheezing, rhonchi or rales. Abdominal:      General: Abdomen is flat. There is no distension. Palpations: Abdomen is soft. Tenderness: There is abdominal tenderness. There is no guarding. Musculoskeletal:         General: Tenderness present. No swelling. Skin:     General: Skin is warm and dry. Coloration: Skin is not pale. Findings: No erythema or rash. Neurological:      General: No focal deficit present. Mental Status: He is alert and oriented to person, place, and time. Mental status is at baseline.    Psychiatric:         Mood and Affect: Mood normal.         Behavior: Behavior normal.         DIFFERENTIAL  DIAGNOSIS     PLAN (LABS / IMAGING / EKG):  Orders Placed This Encounter   Procedures    XR CHEST PORTABLE    CBC Auto Differential    Troponin    Myoglobin, Serum    Basic Metabolic Panel    CK    Hepatic Function Panel    PREVIOUS SPECIMEN    SPECIMEN REJECTION    Lipase    EKG 12 Lead    Insert peripheral IV       MEDICATIONS ORDERED:  Orders Placed This Encounter   Medications    0.9 % sodium 99 mg/dL    BUN 11 6 - 20 mg/dL    CREATININE 0.66 (L) 0.70 - 1.20 mg/dL    Bun/Cre Ratio NOT REPORTED 9 - 20    Calcium 8.8 8.6 - 10.4 mg/dL    Sodium 133 (L) 135 - 144 mmol/L    Potassium 5.2 3.7 - 5.3 mmol/L    Chloride 100 98 - 107 mmol/L    CO2 26 20 - 31 mmol/L    Anion Gap 7 (L) 9 - 17 mmol/L    GFR Non-African American >60 >60 mL/min    GFR African American >60 >60 mL/min    GFR Comment          GFR Staging NOT REPORTED    CK   Result Value Ref Range    Total  (H) 39 - 308 U/L   Hepatic Function Panel   Result Value Ref Range    Alb 3.6 3.5 - 5.2 g/dL    Alkaline Phosphatase 52 40 - 129 U/L    ALT 23 5 - 41 U/L    AST 44 (H) <40 U/L    Total Bilirubin 0.20 (L) 0.3 - 1.2 mg/dL    Bilirubin, Direct <0.08 <0.31 mg/dL    Bilirubin, Indirect CANNOT BE CALCULATED 0.00 - 1.00 mg/dL    Total Protein 7.4 6.4 - 8.3 g/dL    Globulin NOT REPORTED 1.5 - 3.8 g/dL    Albumin/Globulin Ratio 0.9 (L) 1.0 - 2.5   SPECIMEN REJECTION   Result Value Ref Range    Specimen Source NOT REPORTED     Ordered Test BMP CK LIVP     Reason for Rejection Unable to perform testing: Specimen hemolyzed. - NOT REPORTED    Lipase   Result Value Ref Range    Lipase 18 13 - 60 U/L   EKG 12 Lead   Result Value Ref Range    Ventricular Rate 84 BPM    Atrial Rate 84 BPM    P-R Interval 142 ms    QRS Duration 84 ms    Q-T Interval 352 ms    QTc Calculation (Bazett) 415 ms    P Axis 48 degrees    R Axis 41 degrees    T Axis 28 degrees       RADIOLOGY:  XR CHEST PORTABLE   Final Result   No acute cardiopulmonary pathology.                 EKG  EKG Interpretation  September 19, 2020 1016  Interpreted by emergency department physician    Rhythm: normal sinus   Rate: normal  Axis: normal  Ectopy: none  Conduction: normal  ST Segments: nonspecific changes  T Waves: non specific changes  Q Waves: none    Clinical Impression: non-specific EKG    Kathrin Cutler     All EKG's are interpreted by the Emergency Department Physician who either signs or Co-signs this chart in the absence of a cardiologist.    EMERGENCY DEPARTMENT COURSE:  ED Course as of Sep 19 2055   Sat Sep 19, 2020   1025 Patient seen and examined myself Dr. Brian De La Fuente.  History and physical obtained. Patient explained some chest pain, will obtain EKG. Describing diffuse myalgias will evaluate CK myoglobin. Obtain other labs. Pain management with Tylenol and Zofran for nausea. [CR]   1030 Patient currently describes to nurse that he has loss of taste and smell sensation.    [CR]   1123 Patient stating that his pain is not managed at this time. Also describes itching since sitting on the cot. Patient will be given Benadryl and Toradol.    [CR]      ED Course User Index  [CR] Liudmila Pinon,           PROCEDURES:  No Procedures indicated    CONSULTS:  None    MEDICAL DECISION MAKING:  Patient seen and examined for body aches myalgias. Laboratory work-up was relatively negative. Patient did have elevated CK but has been higher before in the patient's past.  Patient also had elevated myoglobin but minimally elevated. Patient did not have an elevated white blood cell count. Patient described loss of taste and loss of smell which could correlate to COVID. Patient's headache and myalgia was controlled with the Toradol and Benadryl. Patient was discharged home. Upon discharge patient requested Mobic for her rheumatoid arthritis, states he does not have any at home. Patient was provided Mobic for rheumatoid arthritis pain management. CRITICAL CARE:  Please see attending note    FINAL IMPRESSION      1.  Body aches          DISPOSITION / PLAN     DISPOSITION Decision To Discharge 09/19/2020 02:18:14 PM      PATIENT REFERRED TO:  [address] [place] [id number] [phone]  Today        DISCHARGE MEDICATIONS:  Discharge Medication List as of 9/19/2020  1:23 PM      START taking these medications    Details   meloxicam (MOBIC)

## 2020-09-19 NOTE — ED NOTES
Bed: 07  Expected date:   Expected time:   Means of arrival:   Comments:     Kusum Romero RN  09/19/20 [id number]

## 2020-09-21 LAB
EKG ATRIAL RATE: 84 BPM
EKG P AXIS: 48 DEGREES
EKG P-R INTERVAL: 142 MS
EKG Q-T INTERVAL: 352 MS
EKG QRS DURATION: 84 MS
EKG QTC CALCULATION (BAZETT): 415 MS
EKG R AXIS: 41 DEGREES
EKG T AXIS: 28 DEGREES
EKG VENTRICULAR RATE: 84 BPM

## 2020-09-21 PROCEDURE — 93010 ELECTROCARDIOGRAM REPORT: CPT | Performed by: INTERNAL MEDICINE

## 2021-01-26 ENCOUNTER — APPOINTMENT (OUTPATIENT)
Dept: GENERAL RADIOLOGY | Age: 31
DRG: 204 | End: 2021-01-26
Payer: MEDICARE

## 2021-01-26 ENCOUNTER — APPOINTMENT (OUTPATIENT)
Dept: CT IMAGING | Age: 31
DRG: 204 | End: 2021-01-26
Payer: MEDICARE

## 2021-01-26 ENCOUNTER — HOSPITAL ENCOUNTER (INPATIENT)
Age: 31
LOS: 1 days | Discharge: LEFT BEFORE DISCHARGE | DRG: 204 | End: 2021-01-27
Attending: EMERGENCY MEDICINE | Admitting: INTERNAL MEDICINE
Payer: MEDICARE

## 2021-01-26 DIAGNOSIS — R06.02 SHORTNESS OF BREATH: ICD-10-CM

## 2021-01-26 DIAGNOSIS — R05.9 COUGH: Primary | ICD-10-CM

## 2021-01-26 DIAGNOSIS — Z86.718 HISTORY OF DVT (DEEP VEIN THROMBOSIS): ICD-10-CM

## 2021-01-26 PROBLEM — J44.1 COPD EXACERBATION (HCC): Status: ACTIVE | Noted: 2021-01-26

## 2021-01-26 LAB
ALBUMIN SERPL-MCNC: 4.8 G/DL (ref 3.5–5.1)
ALP BLD-CCNC: 80 U/L (ref 38–126)
ALT SERPL-CCNC: 65 U/L (ref 11–66)
AMPHETAMINE+METHAMPHETAMINE URINE SCREEN: NEGATIVE
ANION GAP SERPL CALCULATED.3IONS-SCNC: 16 MEQ/L (ref 8–16)
AST SERPL-CCNC: 45 U/L (ref 5–40)
BACTERIA: ABNORMAL /HPF
BARBITURATE QUANTITATIVE URINE: NEGATIVE
BASOPHILS # BLD: 0.5 %
BASOPHILS ABSOLUTE: 0 THOU/MM3 (ref 0–0.1)
BENZODIAZEPINE QUANTITATIVE URINE: NEGATIVE
BILIRUB SERPL-MCNC: 0.3 MG/DL (ref 0.3–1.2)
BILIRUBIN DIRECT: < 0.2 MG/DL (ref 0–0.3)
BILIRUBIN URINE: NEGATIVE
BLOOD, URINE: NEGATIVE
BUN BLDV-MCNC: 10 MG/DL (ref 7–22)
CALCIUM SERPL-MCNC: 9.9 MG/DL (ref 8.5–10.5)
CANNABINOID QUANTITATIVE URINE: NEGATIVE
CASTS 2: ABNORMAL /LPF
CASTS UA: ABNORMAL /LPF
CHARACTER, URINE: CLEAR
CHLORIDE BLD-SCNC: 103 MEQ/L (ref 98–111)
CO2: 23 MEQ/L (ref 23–33)
COCAINE METABOLITE QUANTITATIVE URINE: NEGATIVE
COLOR: YELLOW
CREAT SERPL-MCNC: 0.7 MG/DL (ref 0.4–1.2)
CRYSTALS, UA: ABNORMAL
D-DIMER QUANTITATIVE: 850 NG/ML FEU (ref 0–500)
EKG ATRIAL RATE: 103 BPM
EKG P AXIS: 58 DEGREES
EKG P-R INTERVAL: 136 MS
EKG Q-T INTERVAL: 322 MS
EKG QRS DURATION: 66 MS
EKG QTC CALCULATION (BAZETT): 421 MS
EKG R AXIS: 43 DEGREES
EKG T AXIS: 47 DEGREES
EKG VENTRICULAR RATE: 103 BPM
EOSINOPHIL # BLD: 0.9 %
EOSINOPHILS ABSOLUTE: 0.1 THOU/MM3 (ref 0–0.4)
EPITHELIAL CELLS, UA: ABNORMAL /HPF
ERYTHROCYTE [DISTWIDTH] IN BLOOD BY AUTOMATED COUNT: 13.4 % (ref 11.5–14.5)
ERYTHROCYTE [DISTWIDTH] IN BLOOD BY AUTOMATED COUNT: 47.5 FL (ref 35–45)
GFR SERPL CREATININE-BSD FRML MDRD: > 90 ML/MIN/1.73M2
GLUCOSE BLD-MCNC: 81 MG/DL (ref 70–108)
GLUCOSE URINE: NEGATIVE MG/DL
HCT VFR BLD CALC: 52.2 % (ref 42–52)
HEMOGLOBIN: 16.6 GM/DL (ref 14–18)
IMMATURE GRANS (ABS): 0.02 THOU/MM3 (ref 0–0.07)
IMMATURE GRANULOCYTES: 0.3 %
KETONES, URINE: ABNORMAL
LACTIC ACID: 2.1 MMOL/L (ref 0.5–2.2)
LEUKOCYTE ESTERASE, URINE: NEGATIVE
LYMPHOCYTES # BLD: 28.5 %
LYMPHOCYTES ABSOLUTE: 2.2 THOU/MM3 (ref 1–4.8)
MCH RBC QN AUTO: 30.6 PG (ref 26–33)
MCHC RBC AUTO-ENTMCNC: 31.8 GM/DL (ref 32.2–35.5)
MCV RBC AUTO: 96.3 FL (ref 80–94)
MISCELLANEOUS 2: ABNORMAL
MONOCYTES # BLD: 10.7 %
MONOCYTES ABSOLUTE: 0.8 THOU/MM3 (ref 0.4–1.3)
NITRITE, URINE: NEGATIVE
NUCLEATED RED BLOOD CELLS: 0 /100 WBC
OPIATES, URINE: NEGATIVE
OSMOLALITY CALCULATION: 281.2 MOSMOL/KG (ref 275–300)
OXYCODONE: NEGATIVE
PH UA: 5 (ref 5–9)
PHENCYCLIDINE QUANTITATIVE URINE: NEGATIVE
PLATELET # BLD: 316 THOU/MM3 (ref 130–400)
PMV BLD AUTO: 10.1 FL (ref 9.4–12.4)
POTASSIUM SERPL-SCNC: 4.2 MEQ/L (ref 3.5–5.2)
PRO-BNP: 27 PG/ML (ref 0–450)
PROCALCITONIN: 0.07 NG/ML (ref 0.01–0.09)
PROTEIN UA: 30
RBC # BLD: 5.42 MILL/MM3 (ref 4.7–6.1)
RBC URINE: ABNORMAL /HPF
RENAL EPITHELIAL, UA: ABNORMAL
SARS-COV-2, NAAT: NOT DETECTED
SEG NEUTROPHILS: 59.1 %
SEGMENTED NEUTROPHILS ABSOLUTE COUNT: 4.6 THOU/MM3 (ref 1.8–7.7)
SODIUM BLD-SCNC: 142 MEQ/L (ref 135–145)
SPECIFIC GRAVITY, URINE: 1.03 (ref 1–1.03)
TOTAL CK: 175 U/L (ref 55–170)
TOTAL PROTEIN: 8.9 G/DL (ref 6.1–8)
TROPONIN T: < 0.01 NG/ML
UROBILINOGEN, URINE: 1 EU/DL (ref 0–1)
WBC # BLD: 7.8 THOU/MM3 (ref 4.8–10.8)
WBC UA: ABNORMAL /HPF
YEAST: ABNORMAL

## 2021-01-26 PROCEDURE — 6360000002 HC RX W HCPCS: Performed by: STUDENT IN AN ORGANIZED HEALTH CARE EDUCATION/TRAINING PROGRAM

## 2021-01-26 PROCEDURE — 94761 N-INVAS EAR/PLS OXIMETRY MLT: CPT

## 2021-01-26 PROCEDURE — 71045 X-RAY EXAM CHEST 1 VIEW: CPT

## 2021-01-26 PROCEDURE — 96361 HYDRATE IV INFUSION ADD-ON: CPT

## 2021-01-26 PROCEDURE — 80053 COMPREHEN METABOLIC PANEL: CPT

## 2021-01-26 PROCEDURE — 71275 CT ANGIOGRAPHY CHEST: CPT

## 2021-01-26 PROCEDURE — 96375 TX/PRO/DX INJ NEW DRUG ADDON: CPT

## 2021-01-26 PROCEDURE — 83880 ASSAY OF NATRIURETIC PEPTIDE: CPT

## 2021-01-26 PROCEDURE — 81001 URINALYSIS AUTO W/SCOPE: CPT

## 2021-01-26 PROCEDURE — 93005 ELECTROCARDIOGRAM TRACING: CPT | Performed by: NURSE PRACTITIONER

## 2021-01-26 PROCEDURE — 6360000002 HC RX W HCPCS: Performed by: EMERGENCY MEDICINE

## 2021-01-26 PROCEDURE — 96360 HYDRATION IV INFUSION INIT: CPT

## 2021-01-26 PROCEDURE — 85379 FIBRIN DEGRADATION QUANT: CPT

## 2021-01-26 PROCEDURE — 94640 AIRWAY INHALATION TREATMENT: CPT

## 2021-01-26 PROCEDURE — 85025 COMPLETE CBC W/AUTO DIFF WBC: CPT

## 2021-01-26 PROCEDURE — 1200000003 HC TELEMETRY R&B

## 2021-01-26 PROCEDURE — 82550 ASSAY OF CK (CPK): CPT

## 2021-01-26 PROCEDURE — 83874 ASSAY OF MYOGLOBIN: CPT

## 2021-01-26 PROCEDURE — 84145 PROCALCITONIN (PCT): CPT

## 2021-01-26 PROCEDURE — 96374 THER/PROPH/DIAG INJ IV PUSH: CPT

## 2021-01-26 PROCEDURE — 84484 ASSAY OF TROPONIN QUANT: CPT

## 2021-01-26 PROCEDURE — 82248 BILIRUBIN DIRECT: CPT

## 2021-01-26 PROCEDURE — 83605 ASSAY OF LACTIC ACID: CPT

## 2021-01-26 PROCEDURE — 6370000000 HC RX 637 (ALT 250 FOR IP): Performed by: STUDENT IN AN ORGANIZED HEALTH CARE EDUCATION/TRAINING PROGRAM

## 2021-01-26 PROCEDURE — 80307 DRUG TEST PRSMV CHEM ANLYZR: CPT

## 2021-01-26 PROCEDURE — 87040 BLOOD CULTURE FOR BACTERIA: CPT

## 2021-01-26 PROCEDURE — 36415 COLL VENOUS BLD VENIPUNCTURE: CPT

## 2021-01-26 PROCEDURE — 99285 EMERGENCY DEPT VISIT HI MDM: CPT

## 2021-01-26 PROCEDURE — 2580000003 HC RX 258: Performed by: EMERGENCY MEDICINE

## 2021-01-26 PROCEDURE — U0002 COVID-19 LAB TEST NON-CDC: HCPCS

## 2021-01-26 PROCEDURE — 6370000000 HC RX 637 (ALT 250 FOR IP): Performed by: EMERGENCY MEDICINE

## 2021-01-26 PROCEDURE — 6360000004 HC RX CONTRAST MEDICATION: Performed by: EMERGENCY MEDICINE

## 2021-01-26 RX ORDER — LANOLIN ALCOHOL/MO/W.PET/CERES
1.5 CREAM (GRAM) TOPICAL NIGHTLY PRN
Status: DISCONTINUED | OUTPATIENT
Start: 2021-01-27 | End: 2021-01-27 | Stop reason: HOSPADM

## 2021-01-26 RX ORDER — ONDANSETRON 2 MG/ML
4 INJECTION INTRAMUSCULAR; INTRAVENOUS EVERY 6 HOURS PRN
Status: DISCONTINUED | OUTPATIENT
Start: 2021-01-26 | End: 2021-01-27 | Stop reason: HOSPADM

## 2021-01-26 RX ORDER — DIPHENHYDRAMINE HYDROCHLORIDE 50 MG/ML
50 INJECTION INTRAMUSCULAR; INTRAVENOUS ONCE
Status: COMPLETED | OUTPATIENT
Start: 2021-01-26 | End: 2021-01-26

## 2021-01-26 RX ORDER — KETOROLAC TROMETHAMINE 30 MG/ML
30 INJECTION, SOLUTION INTRAMUSCULAR; INTRAVENOUS ONCE
Status: COMPLETED | OUTPATIENT
Start: 2021-01-26 | End: 2021-01-26

## 2021-01-26 RX ORDER — CLONIDINE HYDROCHLORIDE 0.1 MG/1
0.1 TABLET ORAL 3 TIMES DAILY
COMMUNITY
End: 2021-02-25 | Stop reason: SDUPTHER

## 2021-01-26 RX ORDER — POLYETHYLENE GLYCOL 3350 17 G/17G
17 POWDER, FOR SOLUTION ORAL DAILY PRN
Status: DISCONTINUED | OUTPATIENT
Start: 2021-01-26 | End: 2021-01-27 | Stop reason: HOSPADM

## 2021-01-26 RX ORDER — ACETAMINOPHEN 325 MG/1
650 TABLET ORAL EVERY 6 HOURS PRN
Status: DISCONTINUED | OUTPATIENT
Start: 2021-01-26 | End: 2021-01-27 | Stop reason: HOSPADM

## 2021-01-26 RX ORDER — DIPHENHYDRAMINE HCL 50 MG
50 CAPSULE ORAL EVERY 6 HOURS PRN
COMMUNITY
End: 2021-02-25

## 2021-01-26 RX ORDER — IBUPROFEN 600 MG/1
600 TABLET ORAL EVERY 8 HOURS PRN
Status: ON HOLD | COMMUNITY
End: 2021-06-28

## 2021-01-26 RX ORDER — DULOXETIN HYDROCHLORIDE 60 MG/1
60 CAPSULE, DELAYED RELEASE ORAL DAILY
Status: DISCONTINUED | OUTPATIENT
Start: 2021-01-27 | End: 2021-01-27 | Stop reason: HOSPADM

## 2021-01-26 RX ORDER — DIPHENHYDRAMINE HCL 50 MG
50 CAPSULE ORAL EVERY 6 HOURS PRN
Status: DISCONTINUED | OUTPATIENT
Start: 2021-01-26 | End: 2021-01-26 | Stop reason: SDUPTHER

## 2021-01-26 RX ORDER — CLONIDINE HYDROCHLORIDE 0.1 MG/1
0.1 TABLET ORAL 3 TIMES DAILY
Status: DISCONTINUED | OUTPATIENT
Start: 2021-01-26 | End: 2021-01-27 | Stop reason: HOSPADM

## 2021-01-26 RX ORDER — ACETAMINOPHEN 650 MG/1
650 SUPPOSITORY RECTAL EVERY 6 HOURS PRN
Status: DISCONTINUED | OUTPATIENT
Start: 2021-01-26 | End: 2021-01-27 | Stop reason: HOSPADM

## 2021-01-26 RX ORDER — TRAZODONE HYDROCHLORIDE 100 MG/1
100 TABLET ORAL NIGHTLY
Status: DISCONTINUED | OUTPATIENT
Start: 2021-01-26 | End: 2021-01-27 | Stop reason: HOSPADM

## 2021-01-26 RX ORDER — GABAPENTIN 600 MG/1
600 TABLET ORAL 3 TIMES DAILY
COMMUNITY
End: 2021-02-25 | Stop reason: SDUPTHER

## 2021-01-26 RX ORDER — SODIUM CHLORIDE 0.9 % (FLUSH) 0.9 %
10 SYRINGE (ML) INJECTION PRN
Status: DISCONTINUED | OUTPATIENT
Start: 2021-01-26 | End: 2021-01-27 | Stop reason: HOSPADM

## 2021-01-26 RX ORDER — METHYLPREDNISOLONE SODIUM SUCCINATE 125 MG/2ML
40 INJECTION, POWDER, LYOPHILIZED, FOR SOLUTION INTRAMUSCULAR; INTRAVENOUS ONCE
Status: COMPLETED | OUTPATIENT
Start: 2021-01-26 | End: 2021-01-26

## 2021-01-26 RX ORDER — DIPHENHYDRAMINE HYDROCHLORIDE 50 MG/ML
25 INJECTION INTRAMUSCULAR; INTRAVENOUS ONCE
Status: COMPLETED | OUTPATIENT
Start: 2021-01-26 | End: 2021-01-26

## 2021-01-26 RX ORDER — PROMETHAZINE HYDROCHLORIDE 25 MG/1
12.5 TABLET ORAL EVERY 6 HOURS PRN
Status: DISCONTINUED | OUTPATIENT
Start: 2021-01-26 | End: 2021-01-27 | Stop reason: HOSPADM

## 2021-01-26 RX ORDER — QUETIAPINE FUMARATE 100 MG/1
200 TABLET, FILM COATED ORAL NIGHTLY
Status: DISCONTINUED | OUTPATIENT
Start: 2021-01-26 | End: 2021-01-27 | Stop reason: HOSPADM

## 2021-01-26 RX ORDER — SUVOREXANT 20 MG/1
1 TABLET, FILM COATED ORAL NIGHTLY
Status: ON HOLD | COMMUNITY
End: 2021-06-30 | Stop reason: HOSPADM

## 2021-01-26 RX ORDER — PROMETHAZINE HYDROCHLORIDE 25 MG/1
25 TABLET ORAL EVERY 8 HOURS PRN
COMMUNITY
End: 2021-01-26

## 2021-01-26 RX ORDER — BUDESONIDE AND FORMOTEROL FUMARATE DIHYDRATE 160; 4.5 UG/1; UG/1
2 AEROSOL RESPIRATORY (INHALATION) 2 TIMES DAILY
Refills: 0 | Status: DISCONTINUED | OUTPATIENT
Start: 2021-01-26 | End: 2021-01-27 | Stop reason: HOSPADM

## 2021-01-26 RX ORDER — TIZANIDINE 4 MG/1
4 TABLET ORAL 3 TIMES DAILY
Status: DISCONTINUED | OUTPATIENT
Start: 2021-01-26 | End: 2021-01-27 | Stop reason: HOSPADM

## 2021-01-26 RX ORDER — GABAPENTIN 600 MG/1
600 TABLET ORAL 3 TIMES DAILY
Status: DISCONTINUED | OUTPATIENT
Start: 2021-01-26 | End: 2021-01-27 | Stop reason: HOSPADM

## 2021-01-26 RX ORDER — DIPHENHYDRAMINE HCL 25 MG
50 TABLET ORAL EVERY 6 HOURS PRN
Status: DISCONTINUED | OUTPATIENT
Start: 2021-01-26 | End: 2021-01-27 | Stop reason: HOSPADM

## 2021-01-26 RX ORDER — LORAZEPAM 2 MG/ML
1 INJECTION INTRAMUSCULAR ONCE
Status: COMPLETED | OUTPATIENT
Start: 2021-01-26 | End: 2021-01-26

## 2021-01-26 RX ORDER — QUETIAPINE FUMARATE 200 MG/1
200 TABLET, FILM COATED ORAL NIGHTLY
Status: ON HOLD | COMMUNITY
End: 2021-06-30 | Stop reason: SDUPTHER

## 2021-01-26 RX ORDER — IPRATROPIUM BROMIDE AND ALBUTEROL SULFATE 2.5; .5 MG/3ML; MG/3ML
1 SOLUTION RESPIRATORY (INHALATION) ONCE
Status: COMPLETED | OUTPATIENT
Start: 2021-01-26 | End: 2021-01-26

## 2021-01-26 RX ORDER — 0.9 % SODIUM CHLORIDE 0.9 %
1000 INTRAVENOUS SOLUTION INTRAVENOUS ONCE
Status: COMPLETED | OUTPATIENT
Start: 2021-01-26 | End: 2021-01-26

## 2021-01-26 RX ORDER — SODIUM CHLORIDE 0.9 % (FLUSH) 0.9 %
10 SYRINGE (ML) INJECTION EVERY 12 HOURS SCHEDULED
Status: DISCONTINUED | OUTPATIENT
Start: 2021-01-26 | End: 2021-01-27 | Stop reason: HOSPADM

## 2021-01-26 RX ORDER — TRAZODONE HYDROCHLORIDE 100 MG/1
100 TABLET ORAL NIGHTLY
Status: ON HOLD | COMMUNITY
End: 2021-06-30 | Stop reason: SDUPTHER

## 2021-01-26 RX ADMIN — LORAZEPAM 1 MG: 2 INJECTION INTRAMUSCULAR; INTRAVENOUS at 19:18

## 2021-01-26 RX ADMIN — SODIUM CHLORIDE 1000 ML: 9 INJECTION, SOLUTION INTRAVENOUS at 15:35

## 2021-01-26 RX ADMIN — PROMETHAZINE HYDROCHLORIDE 12.5 MG: 25 TABLET ORAL at 22:10

## 2021-01-26 RX ADMIN — IOPAMIDOL 80 ML: 755 INJECTION, SOLUTION INTRAVENOUS at 21:19

## 2021-01-26 RX ADMIN — METHYLPREDNISOLONE SODIUM SUCCINATE 40 MG: 125 INJECTION, POWDER, FOR SOLUTION INTRAMUSCULAR; INTRAVENOUS at 20:57

## 2021-01-26 RX ADMIN — TRAZODONE HYDROCHLORIDE 100 MG: 100 TABLET ORAL at 22:10

## 2021-01-26 RX ADMIN — DIPHENHYDRAMINE HYDROCHLORIDE 25 MG: 50 INJECTION, SOLUTION INTRAMUSCULAR; INTRAVENOUS at 20:57

## 2021-01-26 RX ADMIN — DIPHENHYDRAMINE HYDROCHLORIDE 50 MG: 50 INJECTION, SOLUTION INTRAMUSCULAR; INTRAVENOUS at 16:57

## 2021-01-26 RX ADMIN — IPRATROPIUM BROMIDE AND ALBUTEROL SULFATE 1 AMPULE: .5; 3 SOLUTION RESPIRATORY (INHALATION) at 19:59

## 2021-01-26 RX ADMIN — KETOROLAC TROMETHAMINE 30 MG: 30 INJECTION, SOLUTION INTRAMUSCULAR at 19:18

## 2021-01-26 ASSESSMENT — PAIN SCALES - GENERAL: PAINLEVEL_OUTOF10: 9

## 2021-01-26 ASSESSMENT — PAIN DESCRIPTION - PAIN TYPE: TYPE: ACUTE PAIN

## 2021-01-26 NOTE — ED NOTES
Lab at bedside for draw. Unsuccessful. States will have another phlebotomist attempt.       Lidia Langston RN  01/26/21 7905

## 2021-01-26 NOTE — ED NOTES
Pt resting in bed with resp regular. Lights dimmed. Pt states feeling a little better. Denies needs. Updated on POC. Verbalized understanding. Call light in reach.       Tyler Cleaning RN  01/26/21 1839

## 2021-01-26 NOTE — ED PROVIDER NOTES
[facility]      Pt Name: Favian Antunez  MRN: [id number]  Darcigfmohinder 1990  Date of evaluation: 1/26/2021  Provider: Herron MD    [address] [place] [address]       Chief Complaint   Patient presents with    Cough    Back Pain         HISTORY OF PRESENT ILLNESS   (Location/Symptom, Timing/Onset, Context/Setting, Quality, Duration, Modifying Factors, Severity)  Note limiting factors. Patient is a 80-year-old male with a history of anxiety, bipolar disorder, rheumatoid arthritis, blood clots who presents for eval of cough and back pain. Patient states that when he coughs he has sharp pains in his back. He notes that his symptoms began over approximately 3-4 days ago. He does report a hx of anxiety but states that he just does not feel right. He does not drink alcohol. He smokes cigarettes daily. Pt notes that he is having shortness of breath as well. Pt is not on anticoagulation. He reports that he had a blood clot in his RLE a while ago but is not on anticoagulation at this time. Pt reports headache, fatigue. He denies fever, photophobia, neck stiffness. Nursing Notes were reviewed. REVIEW OF SYSTEMS    (2-9 systems for level 4, 10 or more for level 5)     Review of Systems   All other systems reviewed and are negative. Except as noted above the remainder of the review of systems was reviewed and negative.        PAST MEDICAL HISTORY     Past Medical History:   Diagnosis Date    Anxiety     Arthritis     Asthma     Bipolar I disorder, most recent episode (or current) depressed, unspecified 9/12/2014    Clostridium difficile infection     COPD [facility]chronic obstructive pulmonary disease) ([facility]     Depression     Disease of blood and blood forming organ     Eczema     Fracture, metacarpal     R 4th and 5th    Gastric ulcer     Gastritis 06/13/2018    GERD (gastroesophageal reflux disease)     GI bleed     H. 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[facility]     Substance abuse ([facility]     Suicidal ideation     Suicide attempt by hanging ([facility]     Tobacco dependence     Ulcerative colitis ([facility]     UTI (urinary tract infection)          SURGICAL HISTORY       Past Surgical History:   Procedure Laterality Date    ABDOMEN SURGERY      upper GI scope 7/7/2015    BRONCHOSCOPY      CHOLECYSTECTOMY, LAPAROSCOPIC  07/14/2017    surgery performed at [address] [place] [address], COLON, DIAGNOSTIC      OTHER SURGICAL HISTORY      lumbar puncture    IL COLONOSCOPY W/BIOPSY SINGLE/MULTIPLE  8/9/2017    COLONOSCOPY WITH BIOPSY performed by Juan M Jacob MD at [facility]    IL EGD TRANSORAL BIOPSY SINGLE/MULTIPLE N/A 3/20/2017    EGD BIOPSY performed by Petrona Combs MD at [facility]    IL ESOPHAGOGASTRODUODENOSCOPY TRANSORAL DIAGNOSTIC N/A 8/9/2017    EGD ESOPHAGOGASTRODUODENOSCOPY performed by Juan M Jacob MD at [address] N/A 3/20/2017    SIGMOIDOSCOPY DIAGNOSTIC FLEXIBLE performed by Petrona Combs MD at [facility]  2/4/16    UPPER GASTROINTESTINAL ENDOSCOPY N/A 6/13/2018    GASTRITIS    UPPER GASTROINTESTINAL ENDOSCOPY N/A 9/20/2018    EGD BIOPSY performed by Jered Askew MD at [facility] Medication List as of 1/27/2021 11:00 AM      CONTINUE these medications which have NOT CHANGED    Details   gabapentin (NEURONTIN) 600 MG tablet Take 600 mg by mouth 3 times daily. Historical Med      ibuprofen (ADVIL;MOTRIN) 600 MG tablet Take 600 mg by mouth every 8 hours as needed for PainHistorical Med      traZODone (DESYREL) 100 MG tablet Take 100 mg by mouth nightlyHistorical Med      Suvorexant (BELSOMRA) 20 MG TABS Take 1 tablet by mouth nightly. Historical Med      QUEtiapine (SEROQUEL) 200 MG tablet Take 200 mg by mouth nightlyHistorical Med      cloNIDine (CATAPRES) 0.1 MG tablet Take 0.1 mg by mouth 3 times dailyHistorical Med      diphenhydrAMINE (BENADRYL) 50 MG capsule Take 50 mg by mouth every 6 hours as needed for ItchingHistorical Med      meloxicam (MOBIC) 7.5 MG tablet Take 1 tablet by mouth daily, Disp-30 tablet,R-0Print      acetaminophen (TYLENOL) 500 MG tablet Take 1 tablet by mouth every 4 hours as needed for Pain, Disp-20 tablet,R-0Print      tiZANidine (ZANAFLEX) 4 MG tablet Take 1 tablet by mouth 3 times daily, Disp-30 tablet, R-0Normal      albuterol sulfate HFA (VENTOLIN HFA) 108 (90 Base) MCG/ACT inhaler Inhale 2 puffs into the lungs every 6 hours as needed for Wheezing, Disp-18 g, R-0Print      DULoxetine (CYMBALTA) 60 MG extended release capsule Take 1 capsule by mouth daily, Disp-14 capsule, R-0Normal      melatonin ER 1 MG TBCR tablet Take 1 tablet by mouth nightly as needed (Sleep), Disp-14 tablet, R-0Normal      Fluticasone Furoate-Vilanterol (BREO ELLIPTA) 200-25 MCG/INH AEPB Inhale 1 puff into the lungs daily, Disp-1 each, R-0Normal      vitamin D (ERGOCALCIFEROL) 88553 units capsule Take 1 capsule by mouth once a week, Disp-30 capsule, R-0Normal             ALLERGIES     Dicyclomine, Famotidine, Geodon [ziprasidone hcl], Haloperidol, Iv dye [iodides], Ibuprofen, Aspirin, Dicyclomine hcl, Flagyl [metronidazole], Iodine, and Mirtazapine    FAMILY HISTORY       Family History   Problem Relation Age of Onset    Diabetes Father     Alcohol Abuse Father     Depression Father     Arthritis Father     High Blood Pressure Father     Other Father         aneurysm & epilepsy    Migraines Father     Arthritis Mother     Other Mother         aneurysm & epilepsy    Migraines Mother     Diabetes Brother         Aunt and uncles    Depression Brother     Mental Illness Brother     Other Brother         epilepsy    Migraines Brother     Stroke Other         Uncle    Other Brother         murdered Oct 6th, 2014    Colon Cancer Paternal Cousin 43    Other Sister         epilepsy    Migraines Sister           SOCIAL HISTORY       Social History     Socioeconomic History    Marital status: Single     Spouse name: None    Number of children: None    Years of education: None    Highest education level: None   Occupational History    Occupation: disability   Social Needs    Financial resource strain: None    Food insecurity     Worry: None     Inability: None    Transportation needs     Medical: None     Non-medical: None   Tobacco Use    Smoking status: Current Every Day Smoker     Packs/day: 0.50     Years: 5.00     Pack years: 2.50     Types: E-Cigarettes, Cigarettes, Cigars    Smokeless tobacco: Never Used    Tobacco comment: states he smokes 3 cigars a day   Substance and Sexual Activity    Alcohol use: Yes     Alcohol/week: 0.0 standard drinks     Comment: socially    Drug use: No     Comment: on methadone, h/o of drug use    Sexual activity: Yes     Partners: Female     Comment: Lives alone, not working.    Lifestyle    Physical activity     Days per week: None     Minutes per session: None    Stress: None   Relationships    Social connections     Talks on phone: None     Gets together: None     Attends Yarsanism service: None     Active member of club or organization: None     Attends meetings of clubs or organizations: None     Relationship status: None    Intimate partner violence     Fear of current or ex partner: None     Emotionally abused: None     Physically abused: None     Forced sexual activity: None   Other Topics Concern    None   Social History Narrative    ** Merged History Encounter **            SCREENINGS        Paul Coma Scale  Eye Opening: Spontaneous  Best Verbal Response: Oriented  Best Motor Response: Obeys commands  Paul Coma Scale Score: 15               PHYSICAL EXAM    (up to 7 for level 4, 8 or more for level 5)     ED Triage Vitals [01/26/21 1508]   BP Temp Temp src Pulse Resp SpO2 Height Weight   (!) 178/113 98 °F (36.7 °C) -- 122 19 99 % 5' 9\" (1.753 m) 260 lb (117.9 kg)       Physical Exam  Vitals signs and nursing note reviewed. Constitutional:       General: He is not in acute distress. Appearance: He is well-developed. He is ill-appearing and diaphoretic. HENT:      Head: Normocephalic. Mouth/Throat:      Pharynx: No oropharyngeal exudate. Eyes:      General:         Right eye: No discharge. Left eye: No discharge. Pupils: Pupils are equal, round, and reactive to light. Neck:      Musculoskeletal: Neck supple. Trachea: No tracheal deviation. Cardiovascular:      Rate and Rhythm: Normal rate and regular rhythm. Heart sounds: Normal heart sounds. No murmur. Pulmonary:      Effort: Pulmonary effort is normal. No respiratory distress. Breath sounds: No stridor. Wheezing and rhonchi present. No rales. Abdominal:      General: Bowel sounds are normal. There is no distension. Palpations: Abdomen is soft. Tenderness: There is no abdominal tenderness. There is no guarding or rebound. Skin:     General: Skin is warm. Capillary Refill: Capillary refill takes less than 2 seconds. Findings: No erythema or rash. Neurological:      Mental Status: He is alert and oriented to person, place, and time. Cranial Nerves: No cranial nerve deficit. Sensory: No sensory deficit. Motor: No abnormal muscle tone. Coordination: Coordination normal.   Psychiatric:         Behavior: Behavior normal.         Thought Content:  Thought content normal.         Judgment: Judgment normal.         DIAGNOSTIC RESULTS     EKG: All EKG's are interpreted by the Emergency Department Physician who either signs or Co-signs this chart in the absence of a cardiologist.    EKG demonstrates sinus tachycardia with ventricular rate of 103. No ST change suggestive of ischemia. No to inversions. EKG is somewhat limited due to baseline artifact from patient shaking. ME interval is 136, QRS duration is 66, QTc is 421. RADIOLOGY:   Non-plain film images such as CT, Ultrasound and MRI are read by the radiologist. Plain radiographic images are visualized and preliminarily interpreted by the emergency physician with the below findings:    Interpretation per the Radiologist below, if available at the time of this note:    VL DUP LOWER EXTREMITY VENOUS BILATERAL   Final Result   1. There is no deep venous thrombosis within the bilateral lower extremities. **This report has been created using voice recognition software. It may contain minor errors which are inherent in voice recognition technology. **      Final report electronically signed by Dr. Prince Conner on 1/27/2021 7:43 AM      CTA CHEST W WO CONTRAST   Final Result   No acute findings. No pulmonary embolism. Left upper lobe groundglass nodule. Recommend follow-up chest CT in 6-12    months. This document has been electronically signed by: Cynthia Patel MD on    01/26/2021 10:05 PM      All CT scans at this facility use dose modulation, iterative    reconstruction, and/or weight-based   dosing when appropriate to reduce radiation dose to as low as reasonably    achievable. XR CHEST PORTABLE   Final Result   No acute findings               **This report has been created using voice recognition software. It may contain minor errors which are inherent in voice recognition technology. **      Final report electronically signed by Dr. Vasyl Gill on 1/26/2021 3:34 PM            ED BEDSIDE ULTRASOUND:   Performed by ED Physician - none    LABS:  Labs Reviewed   CBC WITH AUTO DIFFERENTIAL - Abnormal; Notable for the following components: Result Value    Hematocrit 52.2 (*)     MCV 96.3 (*)     MCHC 31.8 (*)     RDW-SD 47.5 (*)     All other components within normal limits   HEPATIC FUNCTION PANEL - Abnormal; Notable for the following components:    AST 45 (*)     Total Protein 8.9 (*)     All other components within normal limits   D-DIMER, QUANTITATIVE - Abnormal; Notable for the following components:    D-Dimer, Quant 850.00 (*)     All other components within normal limits   URINE WITH REFLEXED MICRO - Abnormal; Notable for the following components:    Ketones, Urine TRACE (*)     Protein, UA 30 (*)     All other components within normal limits   CK - Abnormal; Notable for the following components: Total  (*)     All other components within normal limits   BASIC METABOLIC PANEL W/ REFLEX TO MG FOR LOW K - Abnormal; Notable for the following components:    CO2 22 (*)     Glucose 191 (*)     All other components within normal limits   CBC WITH AUTO DIFFERENTIAL - Abnormal; Notable for the following components:    RBC 4.53 (*)     Hemoglobin 13.6 (*)     Hematocrit 40.3 (*)     Lymphocytes Absolute 0.9 (*)     Monocytes Absolute 0.1 (*)     All other components within normal limits   CULTURE, BLOOD 1    Narrative:     Source: blood-Adult-suboptimal <5.5oz./set volume       Site: Peripheral Vein(single bottle)            Current Antibiotics: not stated   CULTURE, BLOOD 2    Narrative:     Source: blood-Adult-suboptimal <5.5oz./set volume       Site: (single bottle)            Current Antibiotics:   BASIC METABOLIC PANEL   TROPONIN   LACTIC ACID, PLASMA   PROCALCITONIN   URINE DRUG SCREEN   COVID-19   BRAIN NATRIURETIC PEPTIDE   ANION GAP   GLOMERULAR FILTRATION RATE, ESTIMATED   OSMOLALITY   ANION GAP   GLOMERULAR FILTRATION RATE, ESTIMATED   OSMOLALITY   MYOGLOBIN, SERUM       All other labs were within normal range or not returned as of this dictation.     EMERGENCY DEPARTMENT COURSE and DIFFERENTIAL DIAGNOSIS/MDM:   Vitals:    Vitals: 01/26/21 2034 01/26/21 2127 01/27/21 0134 01/27/21 0406   BP: (!) 163/106 134/89 133/80 (!) 121/56   Pulse: 105 100 135 92   Resp: 18 18 20 18   Temp:  98.7 °F (37.1 °C) 98 °F (36.7 °C) 97.4 °F (36.3 °C)   TempSrc:  Oral Oral Oral   SpO2: 96% 91% 92% 93%   Weight:  247 lb (112 kg)     Height:  5' 9\" (1.753 m)         MDM  Number of Diagnoses or Management Options  Cough  Shortness of breath  Diagnosis management comments: 32yo M with chronic prednisone use presenting for cough, back pain, shortness of breath. Pt looked ill on initial eval, so a septic workup was ordered. He is diaphoretic and tachycardic. He did have improvement in his symptoms with benadryl. He denies chest pain at this time. Pt did have rhonchi and wheezing bilaterally so he was treated with solumedrol and duo nebs. Pt did have an elevated d-dimer and I cannot rule out PE given his presenting symptoms and clinical appearance. He will need VQ scan or premedicated for dose of IV contrast. PT agreeable to admission. Pt denies hx of alcohol use which would lower the suspicion for acute withdrawal.     I discussed admission with DR. Gilbert, hospitalist.         REASSESSMENT          CRITICAL CARE TIME       CONSULTS:  None    PROCEDURES:  Unless otherwise noted below, none     Procedures      FINAL IMPRESSION      1. Cough    2. Shortness of breath    3. History of DVT (deep vein thrombosis)          DISPOSITION/PLAN   DISPOSITION Admitted 01/26/2021 07:30:15 PM      PATIENT REFERRED TO:  No follow-up provider specified. DISCHARGE MEDICATIONS:  Discharge Medication List as of 1/27/2021 11:00 AM        Controlled Substances Monitoring:     RX Monitoring 10/11/2019   Attestation -   Periodic Controlled Substance Monitoring Potential drug abuse or diversion identified, see note documentation.        (Please note that portions of this note were completed with a voice recognition program.  Efforts were made to edit the dictations but occasionally words are

## 2021-01-26 NOTE — ED NOTES
Pt IV infiltrated. Removed and dressing applied. Vj VILLAREAL RN in room to attempt to place line and blood draw.       Maureen Spain, FLORY  01/26/21 9820

## 2021-01-26 NOTE — ED NOTES
COVID swab sent to lab. Gave pt urinal to provide urine sample.       Kar Drummond RN  01/26/21 9851

## 2021-01-27 ENCOUNTER — APPOINTMENT (OUTPATIENT)
Dept: INTERVENTIONAL RADIOLOGY/VASCULAR | Age: 31
DRG: 204 | End: 2021-01-27
Payer: MEDICARE

## 2021-01-27 VITALS
DIASTOLIC BLOOD PRESSURE: 56 MMHG | SYSTOLIC BLOOD PRESSURE: 121 MMHG | HEART RATE: 92 BPM | RESPIRATION RATE: 18 BRPM | OXYGEN SATURATION: 93 % | BODY MASS INDEX: 36.58 KG/M2 | TEMPERATURE: 97.4 F | HEIGHT: 69 IN | WEIGHT: 247 LBS

## 2021-01-27 LAB
ANION GAP SERPL CALCULATED.3IONS-SCNC: 10 MEQ/L (ref 8–16)
BASOPHILS # BLD: 0.2 %
BASOPHILS ABSOLUTE: 0 THOU/MM3 (ref 0–0.1)
BUN BLDV-MCNC: 13 MG/DL (ref 7–22)
CALCIUM SERPL-MCNC: 9.1 MG/DL (ref 8.5–10.5)
CHLORIDE BLD-SCNC: 103 MEQ/L (ref 98–111)
CO2: 22 MEQ/L (ref 23–33)
CREAT SERPL-MCNC: 0.9 MG/DL (ref 0.4–1.2)
EOSINOPHIL # BLD: 0 %
EOSINOPHILS ABSOLUTE: 0 THOU/MM3 (ref 0–0.4)
ERYTHROCYTE [DISTWIDTH] IN BLOOD BY AUTOMATED COUNT: 13.2 % (ref 11.5–14.5)
ERYTHROCYTE [DISTWIDTH] IN BLOOD BY AUTOMATED COUNT: 43 FL (ref 35–45)
GFR SERPL CREATININE-BSD FRML MDRD: > 90 ML/MIN/1.73M2
GLUCOSE BLD-MCNC: 191 MG/DL (ref 70–108)
HCT VFR BLD CALC: 40.3 % (ref 42–52)
HEMOGLOBIN: 13.6 GM/DL (ref 14–18)
IMMATURE GRANS (ABS): 0.02 THOU/MM3 (ref 0–0.07)
IMMATURE GRANULOCYTES: 0.3 %
LYMPHOCYTES # BLD: 14.2 %
LYMPHOCYTES ABSOLUTE: 0.9 THOU/MM3 (ref 1–4.8)
MCH RBC QN AUTO: 30 PG (ref 26–33)
MCHC RBC AUTO-ENTMCNC: 33.7 GM/DL (ref 32.2–35.5)
MCV RBC AUTO: 89 FL (ref 80–94)
MONOCYTES # BLD: 0.9 %
MONOCYTES ABSOLUTE: 0.1 THOU/MM3 (ref 0.4–1.3)
NUCLEATED RED BLOOD CELLS: 0 /100 WBC
OSMOLALITY CALCULATION: 275.4 MOSMOL/KG (ref 275–300)
PLATELET # BLD: 268 THOU/MM3 (ref 130–400)
PMV BLD AUTO: 9.9 FL (ref 9.4–12.4)
POTASSIUM REFLEX MAGNESIUM: 4.2 MEQ/L (ref 3.5–5.2)
RBC # BLD: 4.53 MILL/MM3 (ref 4.7–6.1)
SEG NEUTROPHILS: 84.4 %
SEGMENTED NEUTROPHILS ABSOLUTE COUNT: 5.4 THOU/MM3 (ref 1.8–7.7)
SODIUM BLD-SCNC: 135 MEQ/L (ref 135–145)
WBC # BLD: 6.4 THOU/MM3 (ref 4.8–10.8)

## 2021-01-27 PROCEDURE — 93970 EXTREMITY STUDY: CPT

## 2021-01-27 PROCEDURE — 80048 BASIC METABOLIC PNL TOTAL CA: CPT

## 2021-01-27 PROCEDURE — 87040 BLOOD CULTURE FOR BACTERIA: CPT

## 2021-01-27 PROCEDURE — 99231 SBSQ HOSP IP/OBS SF/LOW 25: CPT | Performed by: INTERNAL MEDICINE

## 2021-01-27 PROCEDURE — 36415 COLL VENOUS BLD VENIPUNCTURE: CPT

## 2021-01-27 PROCEDURE — 2580000003 HC RX 258: Performed by: STUDENT IN AN ORGANIZED HEALTH CARE EDUCATION/TRAINING PROGRAM

## 2021-01-27 PROCEDURE — 85025 COMPLETE CBC W/AUTO DIFF WBC: CPT

## 2021-01-27 PROCEDURE — 6370000000 HC RX 637 (ALT 250 FOR IP): Performed by: STUDENT IN AN ORGANIZED HEALTH CARE EDUCATION/TRAINING PROGRAM

## 2021-01-27 RX ADMIN — DIPHENHYDRAMINE HCL 50 MG: 25 TABLET ORAL at 00:34

## 2021-01-27 RX ADMIN — CLONIDINE HYDROCHLORIDE 0.1 MG: 0.1 TABLET ORAL at 00:35

## 2021-01-27 RX ADMIN — GABAPENTIN 600 MG: 600 TABLET, FILM COATED ORAL at 00:35

## 2021-01-27 RX ADMIN — TIZANIDINE 4 MG: 4 TABLET ORAL at 00:35

## 2021-01-27 RX ADMIN — QUETIAPINE FUMARATE 200 MG: 100 TABLET ORAL at 00:35

## 2021-01-27 RX ADMIN — DIPHENHYDRAMINE HCL 50 MG: 25 TABLET ORAL at 07:05

## 2021-01-27 RX ADMIN — Medication 10 ML: at 00:35

## 2021-01-27 NOTE — ED NOTES
Pt took off monitoring equipment and put sweatshirt back on and walked out of room. Informed pt he needed to stay in his room and to push call light if he needed anything. Pt walked back in to room to hooked up to monitor.       Mayra Lambert RN  01/26/21 2048       Mayra Lambert RN  01/26/21 2051

## 2021-01-27 NOTE — ED NOTES
Pt unhooked self from monitor and placed sweatshirt back on. Pt out at nurses station again. Pt asking to call friend. Informed pt phone not available and I could call family for him. Pt states he would just wait until he got upstairs to call. Pt placed back  In bed and cardiac monitor put back on pt.       Malachi Caldwell RN  01/26/21 2036       Malachi Caldwell RN  01/26/21 0441       Malachi Caldwell RN  01/26/21 2052

## 2021-01-27 NOTE — ED NOTES
In room to medicate. Pt not in room. Pt walking around dept. Asked pt again to stay in room. Pt began walking back to room.       Lissy Belcher RN  01/26/21 6421

## 2021-01-27 NOTE — FLOWSHEET NOTE
01/26/21 2300   Provider Notification   Reason for Communication Patient request   Provider Name Dr. Johnathon Alonzo   Provider Notification Physician   Method of Communication Secure Message   Response At bedside   Notification Time [phone]     Patient is getting rather frustrated and agitated/ anxious. He is requesting IV Benadryl - This RN explained he just got some in ED and we always try oral first, especially since he is able to tolerate oral pills. Patient requesting IV fluids for \"dehydration. \" This RN explained his labs do not reflect dehydration and he would not benefit from any normal saline. Encouraged fluid intake. Patient also requesting IV Ativan, \"like I got in the ED. \" This RN asked if patient felt anxious. Patient states, \"yes. \" Asked if patient took Ativan at home, patient denies. Patient is holding hands on head and shaking. Patient responses became short with RN. Requested to speak with doctor at this time. Dr. Johnathon Alonzo notified. 2240: Dr. Johnathon Alonzo entered room with this RN to speak with patient. Patient receptive.

## 2021-01-27 NOTE — PROGRESS NOTES
Patient returned from Bilateral dopplers. Transport alerted this RN patient wanted to take a walk. This RN went into patients room. Patient pacing. Patient states, \"I need to take a walk to make a phone call. \" This RN explained patient that he cannot take a walk by himself with an IV. Patient then asked this RN to remove his IV. This RN explained why we need to keep IV access while he is admitted. Patient states, Reynaldo Saravia should I have this when you won't even give me the meds I want through it! \" Patient then ripped out IV himself and began bleeding everywhere. RN provided patient with guaze and tape. Patient wandering into still and [facility]. AMA paperwork printed. Patient walked off unit and cannot be found at this time.

## 2021-01-27 NOTE — PROGRESS NOTES
46- Patient outside of room. Per night shift RN patient had just removed his own IV. Patient wanting to leave the hospital, informed patient I was preparing his AMA papers. Patient sated he didn't care and that he wasn't legally obligated to stay. Patient then walked off the unit. AMA papers printed but not signed. 3480- Dr. Hendrix Earing to inform him the patient left the hospital.     7176- Mr. Krystyna Adams called back to unit requesting discharge paperwork. Mr. Krystyna Adams was informed that he was not discharged due to his noncompliance therefore no discharge packet can be generated. Mr. Krystyna Adams requested print outs of labs and test findings, to which I informed him I was unable to provide. Mr. Krystyna Adams asked if he could come back to the floor for treatment- he was instructed to seek assistance at the ER if he felt his condition warranted it. Mr. Krystyna Adams said he did not want to waist time with the ER. I then transferred Mr. Krystyna Adams to the medical records extension for further assistance.

## 2021-01-27 NOTE — PROGRESS NOTES
Patient found wandering on unit. Assumed patient turned off his bed alarm. Akila Zuñiga called and then canceled. Patient found by back elevators and stated he was \"looking for he vending machine. \" HR was up to 145 during ambulation. Patient educated on using call light and safety purposes of bed alarm.

## 2021-01-27 NOTE — PROGRESS NOTES
Pt admitted to  5K24 via in a wheelchair from ED. Complaints: cough. IV none infusing into the forearm right, condition patent and no redness. IV site free of s/s of infection or infiltration. Vital signs obtained. Assessment and data collection initiated. Two nurse skin assessment performed by Mariel Howard RN and [address] [place] [address] RN. Oriented to room. Policies and procedures for  explained. All questions answered with no further questions at this time. Fall prevention and safety brochure discussed with patient. Bed alarm on. Call light in reach. Oriented to room. Sara Arcos RN     Explained patients right to have family, representative or physician notified of their admission. Patient has Declined for physician to be notified. Patient has Declined for family/representative to be notified.

## 2021-01-27 NOTE — H&P
Hospitalist - History & Physical      Patient: Densie Vega    Unit/Bed:42/042A  YOB: 1990  MRN: [id number]   Acct: [de-identified]   PCP: No primary care provider on file. Date of Service: Pt seen/examined on 01/26/21  and Admitted to Inpatient with expected LOS greater than two midnights due to medical therapy. Chief Complaint:  Shortness of breath    Assessment and Plan:-  1. PE rule out with hx of DVT in R leg  -came in with pleuritic chest pain  -elevated D-dimer at 800  -allergic to iodine and was given benadryl and ! V solumedrol for prophylaxis  -CT of chest rule out PE  -will get doppler US of bilateral legs in am  -if normal, patient should be okay for discharge     Hx of Drug seeking behavior  -noted     HTN  -controlled  -resume home meds    Depression  -resume home meds    Arthritis  -resume home meds         History Of Present Illness:    Patient Is a 27year old male with hx of anxiety, RA, bipolar disorder, and DVT in R leg who came in for couh and back pain. Patient notes he has been coughing and has chronic back pain that worsens. Patient also admits to nausea and diarrhea. Per chart review patient has been to ER several times over the past few years for the same complaints. On arrival to ED, patient had normal BMP, and CBC, UA and negative for UDS. Patient had an elevated d-dimer in labs. Patient denies any Calf tenderness. He seems to have a lot of anxiety and is a very poor historian. ED provider was not comfortable with doing CTA of the chest due to allergy of iodine.  Allergy was N/V and usually okay with       Past Medical History:        Diagnosis Date    Anxiety     Arthritis     Asthma     Bipolar I disorder, most recent episode (or current) depressed, unspecified 9/12/2014    Clostridium difficile infection     COPD (chronic obstructive pulmonary disease) ([facility]     Depression     Disease of blood and blood forming organ     Eczema     Fracture, metacarpal     R 4th and 5th    Gastric ulcer     Gastritis 06/13/2018    GERD (gastroesophageal reflux disease)     GI bleed     H. 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[address]     Substance abuse ([facility] [address]     Suicidal ideation     Suicide attempt by hanging ([facility] [address]     Tobacco dependence     Ulcerative colitis ([facility] [address]     UTI (urinary tract infection)        Past Surgical History:        Procedure Laterality Date    ABDOMEN SURGERY      upper GI scope 7/7/2015    BRONCHOSCOPY      CHOLECYSTECTOMY, LAPAROSCOPIC  07/14/2017    surgery performed at [address]      lumbar puncture    MO COLONOSCOPY W/BIOPSY SINGLE/MULTIPLE  8/9/2017    COLONOSCOPY WITH BIOPSY performed by Alec Novak MD at [facility]    MO EGD TRANSORAL BIOPSY SINGLE/MULTIPLE N/A 3/20/2017    EGD BIOPSY performed by Sprnig Krishna MD at [facility]    MO ESOPHAGOGASTRODUODENOSCOPY TRANSORAL DIAGNOSTIC N/A 8/9/2017    EGD ESOPHAGOGASTRODUODENOSCOPY performed by Alec Novak MD at [address] N/A 3/20/2017    SIGMOIDOSCOPY DIAGNOSTIC FLEXIBLE performed by Spring Krishna MD at [facility]  2/4/16    UPPER GASTROINTESTINAL ENDOSCOPY N/A 6/13/2018    GASTRITIS    UPPER GASTROINTESTINAL ENDOSCOPY N/A 9/20/2018    EGD BIOPSY performed by Jaylyn Jones MD at [facility] Medications:   No current facility-administered medications on file prior to encounter.       Current Outpatient Medications on File Prior to Encounter   Medication Sig Dispense Refill    gabapentin (NEURONTIN) 600 MG tablet Take 600 mg by mouth tobacco. He reports current alcohol use. He reports that he does not use drugs. Family History:       Problem Relation Age of Onset    Diabetes Father     Alcohol Abuse Father     Depression Father     Arthritis Father     High Blood Pressure Father     Other Father         aneurysm & epilepsy    Migraines Father     Arthritis Mother     Other Mother         aneurysm & epilepsy    Migraines Mother     Diabetes Brother         Aunt and uncles    Depression Brother     Mental Illness Brother     Other Brother         epilepsy    Migraines Brother     Stroke Other         Uncle    Other Brother         murdered Oct 6th, 2014    Colon Cancer Paternal Cousin 43    Other Sister         epilepsy   [employer] Migraines Sister        Diet:  DIET GENERAL;    Review of systems:   Pertinent positives as noted in the HPI. All other systems reviewed and negative. PHYSICAL EXAM:  BP (!) 163/95   Pulse 98   Temp 98 °F (36.7 °C)   Resp 16   Ht 5' 9\" (1.753 m)   Wt 260 lb (117.9 kg)   SpO2 96%   BMI 38.40 kg/m²   General appearance: No apparent distress, appears stated age and cooperative. HEENT: Normal cephalic, atraumatic without obvious deformity. Pupils equal, round, and reactive to light. Extra ocular muscles intact. Conjunctivae/corneas clear. Neck: Supple, with full range of motion. No jugular venous distention. Trachea midline. Respiratory:  Normal respiratory effort. Clear to auscultation, bilaterally without Rales/Wheezes/Rhonchi. Cardiovascular: Regular rate and rhythm with normal S1/S2 without murmurs, rubs or gallops. Abdomen: Soft, non-tender, non-distended with normal bowel sounds. Musculoskeletal:  No clubbing, cyanosis or edema bilaterally. Skin: Skin color, texture, turgor normal.  No rashes or lesions. Neurologic:  Neurovascularly intact without any focal sensory/motor deficits.  Cranial nerves: II-XII intact, grossly non-focal.  Psychiatric: Alert and oriented, thought content appropriate, normal insight  Capillary Refill: Brisk,< 3 seconds   Peripheral Pulses: +2 palpable, equal bilaterally     Labs:   Recent Labs     01/26/21  1656   WBC 7.8   HGB 16.6   HCT 52.2*        Recent Labs     01/26/21  1702      K 4.2      CO2 23   BUN 10   CREATININE 0.7   CALCIUM 9.9     Recent Labs     01/26/21  1702   AST 45*   ALT 65   BILIDIR <0.2   BILITOT 0.3   ALKPHOS 80     No results for input(s): INR in the last 72 hours. Recent Labs     01/26/21  1702   CKTOTAL 175*       Urinalysis:    Lab Results   Component Value Date    NITRU NEGATIVE 01/26/2021    WBCUA 0-2 01/26/2021    BACTERIA NONE SEEN 01/26/2021    RBCUA 0-2 01/26/2021    BLOODU NEGATIVE 01/26/2021    SPECGRAV 1.023 07/19/2019    GLUCOSEU NEGATIVE 01/26/2021       Radiology:   XR CHEST PORTABLE   Final Result   No acute findings               **This report has been created using voice recognition software. It may contain minor errors which are inherent in voice recognition technology. **      Final report electronically signed by Dr. Samantha Lama on 1/26/2021 3:34 PM      CTA CHEST [address]    (Results Pending)     Xr Chest Portable    Result Date: 1/26/2021  PROCEDURE: XR CHEST PORTABLE CLINICAL INFORMATION: cough, back pain. COMPARISON: January 4, 2016 TECHNIQUE: Portable chest. FINDINGS: No lobar consolidation. Costophrenic angles are preserved. Cardiomediastinal silhouette is within normal limits. No acute osseous findings. No acute findings **This report has been created using voice recognition software. It may contain minor errors which are inherent in voice recognition technology. ** Final report electronically signed by Dr. Samantha Lama on 1/26/2021 3:34 PM        EKG: normal sinus rhythm, no blocks or conduction defects, no ischemic changes    Electronically signed by Jasper Bey DO on 1/26/2021 at 8:13 PM     Patients clinical record, labs and radiological imaging reviewed.   I agree with clinical findings , provisional diagnosis and management.

## 2021-01-28 LAB — MYOGLOBIN: 41 NG/ML (ref 28–72)

## 2021-02-01 LAB
BLOOD CULTURE, ROUTINE: NORMAL
BLOOD CULTURE, ROUTINE: NORMAL

## 2021-02-25 ENCOUNTER — OFFICE VISIT (OUTPATIENT)
Dept: INTERNAL MEDICINE CLINIC | Age: 31
End: 2021-02-25
Payer: MEDICARE

## 2021-02-25 VITALS
HEIGHT: 69 IN | WEIGHT: 252 LBS | BODY MASS INDEX: 37.33 KG/M2 | TEMPERATURE: 98.4 F | SYSTOLIC BLOOD PRESSURE: 119 MMHG | DIASTOLIC BLOOD PRESSURE: 74 MMHG | HEART RATE: 78 BPM

## 2021-02-25 DIAGNOSIS — F11.20 SEVERE OPIOID USE DISORDER (HCC): Primary | ICD-10-CM

## 2021-02-25 PROBLEM — R05.9 COUGH: Status: RESOLVED | Noted: 2021-01-26 | Resolved: 2021-02-25

## 2021-02-25 LAB
ALCOHOL URINE: ABNORMAL
AMPHETAMINE SCREEN, URINE: ABNORMAL
BARBITURATE SCREEN, URINE: ABNORMAL
BENZODIAZEPINE SCREEN, URINE: ABNORMAL
BUPRENORPHINE URINE: ABNORMAL
COCAINE METABOLITE SCREEN URINE: ABNORMAL
FENTANYL SCREEN, URINE: ABNORMAL
GABAPENTIN SCREEN, URINE: ABNORMAL
MDMA URINE: ABNORMAL
METHADONE SCREEN, URINE: ABNORMAL
METHAMPHETAMINE, URINE: ABNORMAL
OPIATE SCREEN URINE: ABNORMAL
OXYCODONE SCREEN URINE: ABNORMAL
PHENCYCLIDINE SCREEN URINE: ABNORMAL
PROPOXYPHENE SCREEN, URINE: ABNORMAL
SYNTHETIC CANNABINOIDS(K2) SCREEN, URINE: ABNORMAL
THC SCREEN, URINE: ABNORMAL
TRAMADOL SCREEN URINE: ABNORMAL
TRICYCLIC ANTIDEPRESSANTS, UR: ABNORMAL

## 2021-02-25 PROCEDURE — 4004F PT TOBACCO SCREEN RCVD TLK: CPT | Performed by: NURSE PRACTITIONER

## 2021-02-25 PROCEDURE — 80305 DRUG TEST PRSMV DIR OPT OBS: CPT | Performed by: NURSE PRACTITIONER

## 2021-02-25 PROCEDURE — G8417 CALC BMI ABV UP PARAM F/U: HCPCS | Performed by: NURSE PRACTITIONER

## 2021-02-25 PROCEDURE — G8484 FLU IMMUNIZE NO ADMIN: HCPCS | Performed by: NURSE PRACTITIONER

## 2021-02-25 PROCEDURE — 1111F DSCHRG MED/CURRENT MED MERGE: CPT | Performed by: NURSE PRACTITIONER

## 2021-02-25 PROCEDURE — 99214 OFFICE O/P EST MOD 30 MIN: CPT | Performed by: NURSE PRACTITIONER

## 2021-02-25 PROCEDURE — G8427 DOCREV CUR MEDS BY ELIG CLIN: HCPCS | Performed by: NURSE PRACTITIONER

## 2021-02-25 RX ORDER — PROMETHAZINE HYDROCHLORIDE 12.5 MG/1
12.5 TABLET ORAL EVERY 6 HOURS PRN
Qty: 30 TABLET | Refills: 1 | Status: SHIPPED | OUTPATIENT
Start: 2021-02-25 | End: 2021-06-18 | Stop reason: ALTCHOICE

## 2021-02-25 RX ORDER — DULOXETIN HYDROCHLORIDE 30 MG/1
30 CAPSULE, DELAYED RELEASE ORAL DAILY
Status: ON HOLD | COMMUNITY
End: 2021-06-30 | Stop reason: SDUPTHER

## 2021-02-25 RX ORDER — NALOXONE HYDROCHLORIDE 4 MG/.1ML
1 SPRAY NASAL PRN
Qty: 1 EACH | Refills: 1 | Status: SHIPPED
Start: 2021-02-25 | End: 2021-05-27 | Stop reason: SDUPTHER

## 2021-02-25 RX ORDER — PROMETHAZINE HYDROCHLORIDE 12.5 MG/1
12.5 TABLET ORAL EVERY 6 HOURS PRN
COMMUNITY
End: 2021-02-25 | Stop reason: SDUPTHER

## 2021-02-25 RX ORDER — BUPRENORPHINE HYDROCHLORIDE AND NALOXONE HYDROCHLORIDE DIHYDRATE 8; 2 MG/1; MG/1
1 TABLET SUBLINGUAL 2 TIMES DAILY
Qty: 8 TABLET | Refills: 0 | Status: SHIPPED | OUTPATIENT
Start: 2021-02-25 | End: 2021-03-01 | Stop reason: SDUPTHER

## 2021-02-25 RX ORDER — CLONIDINE HYDROCHLORIDE 0.1 MG/1
0.1 TABLET ORAL 3 TIMES DAILY
Qty: 90 TABLET | Refills: 1 | Status: ON HOLD | OUTPATIENT
Start: 2021-02-25 | End: 2021-06-30 | Stop reason: SDUPTHER

## 2021-02-25 RX ORDER — GABAPENTIN 600 MG/1
600 TABLET ORAL 3 TIMES DAILY
Qty: 90 TABLET | Refills: 0 | Status: ON HOLD | OUTPATIENT
Start: 2021-02-25 | End: 2021-06-28

## 2021-02-25 SDOH — HEALTH STABILITY: MENTAL HEALTH: HOW MANY STANDARD DRINKS CONTAINING ALCOHOL DO YOU HAVE ON A TYPICAL DAY?: NOT ASKED

## 2021-02-25 SDOH — HEALTH STABILITY: MENTAL HEALTH: HOW OFTEN DO YOU HAVE A DRINK CONTAINING ALCOHOL?: NOT ASKED

## 2021-02-25 NOTE — PROGRESS NOTES
Verbal order per Stephen Better CNP for urine drug screen. Positive for BUP,SADAF,MOP,Fentanyl. Verified results with Taylor Cristina LPN.      sent

## 2021-02-25 NOTE — PROGRESS NOTES
[facility]  [address]  Dept: [phone]  Dept Fax: [phone]: [phone]     Visit Date:  2/25/2021    Patient:  Orville Faye  YOB: 1990    HPI:     Chief Complaint   Patient presents with   [facility] Patient    Drug Problem     Faribault presents today for medical evaluation of severe opioid use disorder. I have not seen him previously. States that he started using marijuana and alcohol when he was 14. He was prescribed pain medications when he was 23years old by pain management for RA. When they stopped prescribing them, he began using heroin, around the age of 22. He states that he has been on methadone previously and was clean for 2 years, however he was kicked out of the methadone clinic in [place] after he spent 3 days in halfway for an assault charge. He was on 230 mg daily at that time. He has recently been in a MAT program through [facility] and reports that he was released due to relapsing. He was previously at [facility]. Is currently at a [facility]. States that a jelly brought dope in, and he used once. Urine positive buprenorphine, cocaine, fentanyl, opiates      Discussed at length all MAT options including buprenorphine, naltrexone, and methadone. Wishes to pursue treatment with buprenorphine at this time. Discussed potential for overdose and/or precipitated withdrawal. Understanding voiced. Last use 5 days ago reportedly    Suboxone script is not due to be refilled until 3/3, however he is out. He does not feel that [facility] gave him the appropriate amount. Urges and cravings controlled with Suboxone 8 mg BID    Is planning to start a criminal justice program on 3/3, online.     HIV, Hepatitis C negative    Medications    Current Outpatient Medications:     DULoxetine (CYMBALTA) 30 MG extended (ZANAFLEX) 4 MG tablet, Take 1 tablet by mouth every 8 hours as needed (HEADACHES, BACK PAIN), Disp: 30 tablet, Rfl: 0    The patient is allergic to dicyclomine; famotidine; geodon [ziprasidone hcl]; haloperidol; iv dye [iodides]; ibuprofen; aspirin; dicyclomine hcl; flagyl [metronidazole]; iodine; and mirtazapine. Past Medical History  Moriah Pineda  has a past medical history of Anxiety, Arthritis, Asthma, Bipolar I disorder, most recent episode (or current) depressed, unspecified, Clostridium difficile infection, COPD (chronic obstructive pulmonary disease) ([address] Depression, Disease of blood and blood forming organ, Eczema, Fracture, metacarpal, Gastric ulcer, Gastritis, GERD (gastroesophageal reflux disease), GI bleed, H. pylori infection, H/O blood clots, Head injury, Headache, Insomnia, Juvenile rheumatoid arthritis ([address] Neuromuscular disorder ([address] PFAPA syndrome ([address] PUD (peptic ulcer disease), Rheumatoid arthritis ([address] Rheumatoid arthritis(714.0), Sleep apnea, 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[address] Substance abuse ([address] Suicidal ideation, Suicide attempt by hanging ([address] Tobacco dependence, Ulcerative colitis ([address] and UTI (urinary tract infection). Past Surgical History  The patient  has a past surgical history that includes Colonoscopy; bronchoscopy; other surgical history; Upper gastrointestinal endoscopy (2/4/16); pr egd transoral biopsy single/multiple (N/A, 3/20/2017); sigmoidoscopy (N/A, 3/20/2017); Cholecystectomy, laparoscopic (07/14/2017); pr esophagogastroduodenoscopy transoral diagnostic (N/A, 8/9/2017); pr colonoscopy w/biopsy single/multiple (8/9/2017); Abdomen surgery; Upper gastrointestinal endoscopy (N/A, 6/13/2018); Upper gastrointestinal endoscopy (N/A, 9/20/2018); and Endoscopy, colon, diagnostic.     Family History  This patient's family history includes Alcohol Abuse in his father; Arthritis in his father and mother; Colon Cancer (age of onset: 43) in his paternal cousin; Depression in his brother and father; Diabetes in his brother and father; High Blood Pressure in his father; Mental Illness in his brother; Migraines in his brother, father, mother, and sister; Other in his brother, brother, father, mother, and sister; Stroke in an other family member. Social History  Jason Hinds  reports that he has been smoking cigarettes. He has a 5.00 pack-year smoking history. He has never used smokeless tobacco. He reports previous alcohol use. He reports that he does not use drugs. Health Maintenance:    Health Maintenance   Topic Date Due    Varicella vaccine (1 of 2 - 2-dose childhood series) Never done    DTaP/Tdap/Td vaccine (1 - Tdap) Never done    A1C test (Diabetic or Prediabetic)  10/23/2015    Flu vaccine (1) 09/01/2020    Pneumococcal 0-64 years Vaccine  Completed    Hepatitis C screen  Completed    HIV screen  Completed    Hepatitis A vaccine  Aged Out    Hepatitis B vaccine  Aged Out    Hib vaccine  Aged Out    Meningococcal (ACWY) vaccine  Aged Out       Subjective:      Review of Systems   Constitutional: Negative for chills, fatigue and fever. HENT: Negative for congestion, rhinorrhea, sinus pressure, sinus pain, sore throat, tinnitus and trouble swallowing. Eyes: Negative for photophobia and visual disturbance. Respiratory: Negative for cough, shortness of breath and wheezing. Cardiovascular: Negative for chest pain, palpitations and leg swelling. Gastrointestinal: Negative for abdominal distention, abdominal pain, blood in stool, constipation, diarrhea, nausea and vomiting. Endocrine: Negative for polydipsia, polyphagia and polyuria. Genitourinary: Negative for difficulty urinating, dysuria, frequency, hematuria and urgency. Musculoskeletal: Negative for arthralgias and myalgias. Skin: Negative for rash and wound. Neurological: Negative for dizziness, light-headedness and headaches. Psychiatric/Behavioral: Negative for dysphoric mood and sleep disturbance. The patient is not nervous/anxious. Objective:     /74   Pulse 78   Temp 98.4 °F (36.9 °C) (Temporal)   Ht 5' 9\" (1.753 m)   Wt 252 lb (114.3 kg)   BMI 37.21 kg/m²     Physical Exam  Vitals signs reviewed. Constitutional:       General: He is not in acute distress. Appearance: Normal appearance. He is not ill-appearing. HENT:      Head: Normocephalic and atraumatic. Right Ear: External ear normal.      Left Ear: External ear normal.      Nose: Nose normal. No congestion or rhinorrhea. Mouth/Throat:      Mouth: Mucous membranes are moist.   Eyes:      Extraocular Movements: Extraocular movements intact. Conjunctiva/sclera: Conjunctivae normal.      Pupils: Pupils are equal, round, and reactive to light. Neck:      Musculoskeletal: Normal range of motion and neck supple. Pulmonary:      Effort: Pulmonary effort is normal. No respiratory distress. Musculoskeletal: Normal range of motion. Right lower leg: No edema. Left lower leg: No edema. Skin:     General: Skin is warm and dry. Neurological:      General: No focal deficit present. Mental Status: He is alert and oriented to person, place, and time. Psychiatric:         Mood and Affect: Mood normal.         Behavior: Behavior normal.         Thought Content: Thought content normal.         Judgment: Judgment normal.         Labs Reviewed 2/25/2021:    Lab Results   Component Value Date    WBC 6.4 01/27/2021    HGB 13.6 (L) 01/27/2021    HCT 40.3 (L) 01/27/2021     01/27/2021    CHOL 205 (H) 02/21/2017    TRIG 189 (H) 02/21/2017    HDL 44 02/21/2017    ALT 21 03/01/2021    AST 28 03/01/2021     03/01/2021    K 3.9 03/01/2021     03/01/2021    CREATININE 0.91 03/01/2021    BUN 9 03/01/2021    CO2 27 03/01/2021    TSH 0.59 03/28/2018    INR 1.0 05/09/2018    LABA1C 5.7 10/23/2014       Assessment/Plan      1.  Severe opioid use disorder (HCC)    - POCT Rapid Drug Screen  - Hepatitis C Antibody; Future  - HIV-1 and HIV-2 Antibodies; Future  - Comprehensive Metabolic Panel; Future  - Continue Suboxone 8 mg BID- voucher used at [facility] reviewed, no discrepancies  - Narcan at home  - Sustain from illicit drug use- discussed potential for overdose and/or precipitated withdrawal. Understanding voiced. - Attend NA/AA meetings and/or arrange for individualized counseling services    Return in about 4 days (around 3/1/2021). Patient given educational materials - see patient instructions. Discussed use, benefit, and side effects of prescribed medications. All patient questions answered. Pt voiced understanding. Reviewed health maintenance.        Electronically signed MASSIEL Dobbs - CNP on 2/25/21 at 2:33 PM EST

## 2021-03-01 ENCOUNTER — OFFICE VISIT (OUTPATIENT)
Dept: INTERNAL MEDICINE CLINIC | Age: 31
End: 2021-03-01
Payer: MEDICAID

## 2021-03-01 VITALS
TEMPERATURE: 98.9 F | BODY MASS INDEX: 35.84 KG/M2 | HEART RATE: 82 BPM | WEIGHT: 242 LBS | HEIGHT: 69 IN | SYSTOLIC BLOOD PRESSURE: 113 MMHG | DIASTOLIC BLOOD PRESSURE: 75 MMHG

## 2021-03-01 DIAGNOSIS — F11.20 SEVERE OPIOID USE DISORDER (HCC): Primary | ICD-10-CM

## 2021-03-01 PROCEDURE — 4004F PT TOBACCO SCREEN RCVD TLK: CPT | Performed by: NURSE PRACTITIONER

## 2021-03-01 PROCEDURE — 99213 OFFICE O/P EST LOW 20 MIN: CPT | Performed by: NURSE PRACTITIONER

## 2021-03-01 PROCEDURE — G8417 CALC BMI ABV UP PARAM F/U: HCPCS | Performed by: NURSE PRACTITIONER

## 2021-03-01 PROCEDURE — G8484 FLU IMMUNIZE NO ADMIN: HCPCS | Performed by: NURSE PRACTITIONER

## 2021-03-01 PROCEDURE — 80305 DRUG TEST PRSMV DIR OPT OBS: CPT | Performed by: NURSE PRACTITIONER

## 2021-03-01 PROCEDURE — G8427 DOCREV CUR MEDS BY ELIG CLIN: HCPCS | Performed by: NURSE PRACTITIONER

## 2021-03-01 RX ORDER — BUPRENORPHINE HYDROCHLORIDE AND NALOXONE HYDROCHLORIDE DIHYDRATE 8; 2 MG/1; MG/1
1 TABLET SUBLINGUAL 2 TIMES DAILY
Qty: 8 TABLET | Refills: 0 | Status: SHIPPED | OUTPATIENT
Start: 2021-03-01 | End: 2021-03-05

## 2021-03-01 RX ORDER — ACETAMINOPHEN 500 MG
1000 TABLET ORAL 4 TIMES DAILY PRN
Qty: 60 TABLET | Refills: 0 | Status: SHIPPED
Start: 2021-03-01 | End: 2021-03-08 | Stop reason: SDUPTHER

## 2021-03-01 NOTE — PROGRESS NOTES
Verbal order per Yordy Carrizales CNP for urine drug screen. Positive for BUP,FEN. Verified results with Milady Patton RN.

## 2021-03-01 NOTE — PROGRESS NOTES
[facility]NAG[facility]  [address]  Dept: [phone]  Dept Fax: [phone]: [phone]     Visit Date:  3/1/2021    Patient:  Nevaeh Gillespie  YOB: 1990    HPI:     Chief Complaint   Patient presents with    Drug Problem     Jamison Lane presents today for medical evaluation of severe opioid use disorder. I last seen him Fri. MAT initiated at that time. Starts school on Wed reportedly. Urine positive for buprenorphine and fentanyl. States that he has not used in 9 days, however he did get some tylenol from his roommate. Will send urine for comprehensive analysis. Urges and cravings controlled with suboxone 8 mg bid    Medications    Current Outpatient Medications:     DULoxetine (CYMBALTA) 30 MG extended release capsule, Take 30 mg by mouth daily, Disp: , Rfl:     naloxone 4 MG/0.1ML LIQD nasal spray, 1 spray by Nasal route as needed for Opioid Reversal, Disp: 1 each, Rfl: 1    gabapentin (NEURONTIN) 600 MG tablet, Take 1 tablet by mouth 3 times daily for 30 days. , Disp: 90 tablet, Rfl: 0    cloNIDine (CATAPRES) 0.1 MG tablet, Take 1 tablet by mouth 3 times daily, Disp: 90 tablet, Rfl: 1    promethazine (PHENERGAN) 12.5 MG tablet, Take 1 tablet by mouth every 6 hours as needed for Nausea, Disp: 30 tablet, Rfl: 1    ibuprofen (ADVIL;MOTRIN) 600 MG tablet, Take 600 mg by mouth every 8 hours as needed for Pain, Disp: , Rfl:     traZODone (DESYREL) 100 MG tablet, Take 100 mg by mouth nightly, Disp: , Rfl:     Suvorexant (BELSOMRA) 20 MG TABS, Take 1 tablet by mouth nightly., Disp: , Rfl:     QUEtiapine (SEROQUEL) 200 MG tablet, Take 200 mg by mouth nightly, Disp: , Rfl:     meloxicam (MOBIC) 7.5 MG tablet, Take 1 tablet by mouth daily, Disp: 30 tablet, Rfl: 0    acetaminophen (TYLENOL) 500 MG tablet, Take 1 tablet by mouth every 4 hours as needed for Pain, Disp: 20 tablet, Rfl: 0    albuterol sulfate HFA (VENTOLIN HFA) 108 (90 Base) MCG/ACT inhaler, Inhale 2 puffs into the lungs every 6 hours as needed for Wheezing (Patient taking differently: Inhale 2 puffs into the lungs every 6 hours as needed for Wheezing ), Disp: 18 g, Rfl: 0    melatonin ER 1 MG TBCR tablet, Take 1 tablet by mouth nightly as needed (Sleep), Disp: 14 tablet, Rfl: 0    Fluticasone Furoate-Vilanterol (BREO ELLIPTA) 200-25 MCG/INH AEPB, Inhale 1 puff into the lungs daily, Disp: 1 each, Rfl: 0    vitamin D (ERGOCALCIFEROL) 14422 units capsule, Take 1 capsule by mouth once a week (Patient taking differently: Take 50,000 Units by mouth once a week Takes every Wed), Disp: 30 capsule, Rfl: 0    buprenorphine-naloxone (SUBOXONE) 8-2 MG FILM SL film, Place 1 Film under the tongue 2 times daily for 7 days. , Disp: 14 Film, Rfl: 0    tiZANidine (ZANAFLEX) 4 MG tablet, Take 1 tablet by mouth every 8 hours as needed (HEADACHES, BACK PAIN), Disp: 30 tablet, Rfl: 0    The patient is allergic to dicyclomine; famotidine; geodon [ziprasidone hcl]; haloperidol; iv dye [iodides]; ibuprofen; aspirin; dicyclomine hcl; flagyl [metronidazole]; iodine; and mirtazapine.     Past Medical History  Analy Kerr  has a past medical history of Anxiety, Arthritis, Asthma, Bipolar I disorder, most recent episode (or current) depressed, unspecified, Clostridium difficile infection, COPD (chronic obstructive pulmonary disease) ([facility] [address] Depression, Disease of blood and blood forming organ, Eczema, Fracture, metacarpal, Gastric ulcer, Gastritis, GERD (gastroesophageal reflux disease), GI bleed, H. pylori infection, H/O blood clots, Head injury, Headache, Insomnia, Juvenile rheumatoid arthritis ([address] Neuromuscular disorder ([address] PFAPA syndrome ([facility] [address] PUD (peptic ulcer disease), Rheumatoid arthritis ([address] Rheumatoid arthritis(714.0), Sleep apnea, 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[facility] [address] [facility] Suicidal ideation, Suicide attempt by hanging ([facility] [address] Tobacco dependence, Ulcerative colitis ([facility] [address] and UTI (urinary tract infection). Past Surgical History  The patient  has a past surgical history that includes Colonoscopy; bronchoscopy; other surgical history; Upper gastrointestinal endoscopy (2/4/16); pr egd transoral biopsy single/multiple (N/A, 3/20/2017); sigmoidoscopy (N/A, 3/20/2017); Cholecystectomy, laparoscopic (07/14/2017); pr esophagogastroduodenoscopy transoral diagnostic (N/A, 8/9/2017); pr colonoscopy w/biopsy single/multiple (8/9/2017); Abdomen surgery; Upper gastrointestinal endoscopy (N/A, 6/13/2018); Upper gastrointestinal endoscopy (N/A, 9/20/2018); and Endoscopy, colon, diagnostic. Family History  This patient's family history includes Alcohol Abuse in his father; Arthritis in his father and mother; Colon Cancer (age of onset: 43) in his paternal cousin; Depression in his brother and father; Diabetes in his brother and father; High Blood Pressure in his father; Mental Illness in his brother; Migraines in his brother, father, mother, and sister; Other in his brother, brother, father, mother, and sister; Stroke in an other family member. Social History  Jenn Cheng  reports that he has been smoking cigarettes. He has a 5.00 pack-year smoking history. He has never used smokeless tobacco. He reports previous alcohol use. He reports that he does not use drugs.     Health Maintenance:    Health Maintenance   Topic Date Due    Varicella vaccine (1 of 2 - 2-dose childhood series) Never done    DTaP/Tdap/Td vaccine (1 - Tdap) Never done    A1C test (Diabetic or Prediabetic)  10/23/2015    Flu vaccine (1) 09/01/2020    Pneumococcal 0-64 years Vaccine  Completed    Hepatitis C screen  Completed    HIV screen  Completed    Hepatitis A vaccine  Aged Out    Hepatitis B vaccine  Aged Out    Hib vaccine  Aged Out    Meningococcal (ACWY) vaccine  Aged Out       Subjective:      Review of Systems   Constitutional: Negative for chills, fatigue and fever. HENT: Negative for congestion, rhinorrhea, sinus pressure, sinus pain, sore throat, tinnitus and trouble swallowing. Eyes: Negative for photophobia and visual disturbance. Respiratory: Negative for cough, shortness of breath and wheezing. Cardiovascular: Negative for chest pain, palpitations and leg swelling. Gastrointestinal: Negative for abdominal distention, abdominal pain, blood in stool, constipation, diarrhea, nausea and vomiting. Endocrine: Negative for polydipsia, polyphagia and polyuria. Genitourinary: Negative for difficulty urinating, dysuria, frequency, hematuria and urgency. Musculoskeletal: Negative for arthralgias and myalgias. Skin: Negative for rash and wound. Neurological: Positive for dizziness and headaches. Negative for light-headedness. Psychiatric/Behavioral: Positive for sleep disturbance. Negative for dysphoric mood. The patient is not nervous/anxious. Objective:     /75   Pulse 82   Temp 98.9 °F (37.2 °C) (Temporal)   Ht 5' 9\" (1.753 m)   Wt 242 lb (109.8 kg)   BMI 35.74 kg/m²     Physical Exam  Vitals signs reviewed. Constitutional:       General: He is not in acute distress. Appearance: Normal appearance. He is not ill-appearing. HENT:      Head: Normocephalic and atraumatic. Right Ear: External ear normal.      Left Ear: External ear normal.      Nose: Nose normal. No congestion or rhinorrhea. Mouth/Throat:      Mouth: Mucous membranes are moist.   Eyes:      Extraocular Movements: Extraocular movements intact. Conjunctiva/sclera: Conjunctivae normal.      Pupils: Pupils are equal, round, and reactive to light. Neck:      Musculoskeletal: Normal range of motion and neck supple. Pulmonary:      Effort: Pulmonary effort is normal. No respiratory distress. Musculoskeletal: Normal range of motion. Right lower leg: No edema. Left lower leg: No edema. Skin:     General: Skin is warm and dry. Neurological:      General: No focal deficit present. Mental Status: He is alert and oriented to person, place, and time. Psychiatric:         Mood and Affect: Mood normal.         Behavior: Behavior normal.         Thought Content: Thought content normal.         Judgment: Judgment normal.         Labs Reviewed 3/1/2021:    Lab Results   Component Value Date    WBC 6.4 01/27/2021    HGB 13.6 (L) 01/27/2021    HCT 40.3 (L) 01/27/2021     01/27/2021    CHOL 205 (H) 02/21/2017    TRIG 189 (H) 02/21/2017    HDL 44 02/21/2017    ALT 21 03/01/2021    AST 28 03/01/2021     03/01/2021    K 3.9 03/01/2021     03/01/2021    CREATININE 0.91 03/01/2021    BUN 9 03/01/2021    CO2 27 03/01/2021    TSH 0.59 03/28/2018    INR 1.0 05/09/2018    LABA1C 5.7 10/23/2014       Assessment/Plan      1. Severe opioid use disorder (HCC)    - POCT Rapid Drug Screen  - Comprehensive Metabolic Panel  - HIV 1/2 Ag/Ab COMBO  - Hepatitis C Antibody  - Continue Suboxone 8 mg BID  - OARRS reviewed, no discrepancies  - Sustain from illicit drug use. Discussed potential risks including overdose and/or precipitated withdrawal. Understanding voiced. - Narcan at home   - Encouraged counseling at [facility]      Return in about 4 days (around 3/5/2021). Patient given educational materials - see patient instructions. Discussed use, benefit, and side effects of prescribed medications. All patient questions answered. Pt voiced understanding. Reviewed health maintenance.        Electronically signed Triston Friday, APRN - CNP on 3/1/21 at 1:09 PM EST

## 2021-03-02 LAB
ALBUMIN SERPL-MCNC: 4.7 G/DL (ref 3.2–5.3)
ALK PHOSPHATASE: 64 U/L (ref 39–130)
ALT SERPL-CCNC: 21 U/L (ref 0–40)
ANION GAP SERPL CALCULATED.3IONS-SCNC: 10 MMOL/L (ref 5–15)
AST SERPL-CCNC: 28 U/L (ref 0–41)
BILIRUB SERPL-MCNC: 0.6 MG/DL (ref 0.3–1.2)
BUN BLDV-MCNC: 9 MG/DL (ref 5–23)
CALCIUM SERPL-MCNC: 9.2 MG/DL (ref 8.5–10.5)
CHLORIDE BLD-SCNC: 101 MMOL/L (ref 98–109)
CO2: 27 MMOL/L (ref 22–32)
CREAT SERPL-MCNC: 0.91 MG/DL (ref 0.6–1.3)
EGFR AFRICAN AMERICAN: >60 ML/MIN/1.73SQ.M
EGFR IF NONAFRICAN AMERICAN: >60 ML/MIN/1.73SQ.M
GLUCOSE: 99 MG/DL (ref 65–99)
HEPATITIS C ANTIBODY: NORMAL
HIV 1,2 COMBO ANTIGEN/ANTIBODY: NORMAL
POTASSIUM SERPL-SCNC: 3.9 MMOL/L (ref 3.5–5)
SODIUM BLD-SCNC: 138 MMOL/L (ref 134–146)
TOTAL PROTEIN: 8.2 G/DL (ref 6–8)

## 2021-03-05 ENCOUNTER — OFFICE VISIT (OUTPATIENT)
Dept: INTERNAL MEDICINE CLINIC | Age: 31
End: 2021-03-05
Payer: MEDICARE

## 2021-03-05 VITALS
HEIGHT: 69 IN | SYSTOLIC BLOOD PRESSURE: 122 MMHG | HEART RATE: 80 BPM | DIASTOLIC BLOOD PRESSURE: 83 MMHG | BODY MASS INDEX: 35.84 KG/M2 | TEMPERATURE: 98.3 F | WEIGHT: 242 LBS

## 2021-03-05 DIAGNOSIS — Z96.89 S/P DEEP BRAIN STIMULATOR PLACEMENT: ICD-10-CM

## 2021-03-05 DIAGNOSIS — F11.20 SEVERE OPIOID USE DISORDER (HCC): Primary | ICD-10-CM

## 2021-03-05 PROCEDURE — G8427 DOCREV CUR MEDS BY ELIG CLIN: HCPCS | Performed by: NURSE PRACTITIONER

## 2021-03-05 PROCEDURE — 99214 OFFICE O/P EST MOD 30 MIN: CPT | Performed by: NURSE PRACTITIONER

## 2021-03-05 PROCEDURE — 4004F PT TOBACCO SCREEN RCVD TLK: CPT | Performed by: NURSE PRACTITIONER

## 2021-03-05 PROCEDURE — G8417 CALC BMI ABV UP PARAM F/U: HCPCS | Performed by: NURSE PRACTITIONER

## 2021-03-05 PROCEDURE — 80305 DRUG TEST PRSMV DIR OPT OBS: CPT | Performed by: NURSE PRACTITIONER

## 2021-03-05 PROCEDURE — G8484 FLU IMMUNIZE NO ADMIN: HCPCS | Performed by: NURSE PRACTITIONER

## 2021-03-05 RX ORDER — BUPRENORPHINE HYDROCHLORIDE AND NALOXONE HYDROCHLORIDE DIHYDRATE 8; 2 MG/1; MG/1
1 TABLET SUBLINGUAL 2 TIMES DAILY
Qty: 8 TABLET | Refills: 0 | Status: CANCELLED | OUTPATIENT
Start: 2021-03-05 | End: 2021-03-09

## 2021-03-05 RX ORDER — BUPRENORPHINE AND NALOXONE 8; 2 MG/1; MG/1
1 FILM, SOLUBLE BUCCAL; SUBLINGUAL 2 TIMES DAILY
Qty: 6 FILM | Refills: 0 | Status: SHIPPED | OUTPATIENT
Start: 2021-03-05 | End: 2021-03-08 | Stop reason: SDUPTHER

## 2021-03-05 NOTE — PROGRESS NOTES
[facility]ICATION MANAGEMENT  [address]  Dept: [phone]  Dept Fax: [phone]: [phone]     Visit Date:  3/5/2021    Patient:  Nevaeh Gillespie  YOB: 1990    HPI:     Chief Complaint   Patient presents with    Drug Problem     Jamison Lane presents today for medical evaluation of severe opioid use disorder and for referral to neuroscientist.    I last seen him 4 days ago. Urine positive for buprenorphine and fentanyl. States that when he recently moved rooms at the house he is at, he found a small bag of fentanyl. He did use a little bit reportedly and threw the rest away. He continued to take his Suboxone as prescribed. Discussed potential risks and interactions, understanding voiced. He is active in counseling at [employer]. He started school Wed for [employer] reportedly. Is requesting referral to a specific neurosurgeon, Dr. Demario Escobar in [address] Jamison [address] reports that he was in an investigational study at [facility] for the treatment of PTSD, and had a brain stimulator placed. He then dropped out of the study, and is complaining of frequent headaches, palpitations, dizziness, and hot/cold flashes. He wants the stimulator removed. No neuro deficits noted on physical exam.     Medications    Current Outpatient Medications:     DULoxetine (CYMBALTA) 30 MG extended release capsule, Take 30 mg by mouth daily, Disp: , Rfl:     naloxone 4 MG/0.1ML LIQD nasal spray, 1 spray by Nasal route as needed for Opioid Reversal, Disp: 1 each, Rfl: 1    gabapentin (NEURONTIN) 600 MG tablet, Take 1 tablet by mouth 3 times daily for 30 days. , Disp: 90 tablet, Rfl: 0    cloNIDine (CATAPRES) 0.1 MG tablet, Take 1 tablet by mouth 3 times daily, Disp: 90 tablet, Rfl: 1    promethazine (PHENERGAN) 12.5 MG tablet, Take 1 tablet by mouth every 6 hours as needed for Nausea, Disp: 30 tablet, Rfl: 1    ibuprofen (ADVIL;MOTRIN) 600 MG tablet, Take 600 mg by mouth every 8 hours as needed for Pain, Disp: , Rfl:     traZODone (DESYREL) 100 MG tablet, Take 100 mg by mouth nightly, Disp: , Rfl:     Suvorexant (BELSOMRA) 20 MG TABS, Take 1 tablet by mouth nightly., Disp: , Rfl:     QUEtiapine (SEROQUEL) 200 MG tablet, Take 200 mg by mouth nightly, Disp: , Rfl:     meloxicam (MOBIC) 7.5 MG tablet, Take 1 tablet by mouth daily, Disp: 30 tablet, Rfl: 0    acetaminophen (TYLENOL) 500 MG tablet, Take 1 tablet by mouth every 4 hours as needed for Pain, Disp: 20 tablet, Rfl: 0    albuterol sulfate HFA (VENTOLIN HFA) 108 (90 Base) MCG/ACT inhaler, Inhale 2 puffs into the lungs every 6 hours as needed for Wheezing (Patient taking differently: Inhale 2 puffs into the lungs every 6 hours as needed for Wheezing ), Disp: 18 g, Rfl: 0    melatonin ER 1 MG TBCR tablet, Take 1 tablet by mouth nightly as needed (Sleep), Disp: 14 tablet, Rfl: 0    Fluticasone Furoate-Vilanterol (BREO ELLIPTA) 200-25 MCG/INH AEPB, Inhale 1 puff into the lungs daily, Disp: 1 each, Rfl: 0    vitamin D (ERGOCALCIFEROL) 58062 units capsule, Take 1 capsule by mouth once a week (Patient taking differently: Take 50,000 Units by mouth once a week Takes every Wed), Disp: 30 capsule, Rfl: 0    buprenorphine-naloxone (SUBOXONE) 8-2 MG FILM SL film, Place 1 Film under the tongue 2 times daily for 7 days. , Disp: 14 Film, Rfl: 0    tiZANidine (ZANAFLEX) 4 MG tablet, Take 1 tablet by mouth every 8 hours as needed (HEADACHES, BACK PAIN), Disp: 30 tablet, Rfl: 0    The patient is allergic to dicyclomine; famotidine; geodon [ziprasidone hcl]; haloperidol; iv dye [iodides]; ibuprofen; aspirin; dicyclomine hcl; flagyl [metronidazole]; iodine; and mirtazapine.     Past Medical History  Tim Dunlap  has a past medical history of Anxiety, Arthritis, Asthma, Bipolar I disorder, most recent episode (or current) depressed, unspecified, Clostridium difficile Maintenance:    Health Maintenance   Topic Date Due    Varicella vaccine (1 of 2 - 2-dose childhood series) Never done    DTaP/Tdap/Td vaccine (1 - Tdap) Never done    A1C test (Diabetic or Prediabetic)  10/23/2015    Annual Wellness Visit (AWV)  Never done    Flu vaccine (1) 09/01/2020    Pneumococcal 0-64 years Vaccine  Completed    Hepatitis C screen  Completed    HIV screen  Completed    Hepatitis A vaccine  Aged Out    Hepatitis B vaccine  Aged Out    Hib vaccine  Aged Out    Meningococcal (ACWY) vaccine  Aged Out       Subjective:      Review of Systems   Cardiovascular: Positive for palpitations. Neurological: Positive for dizziness and headaches. All other systems reviewed and are negative. Objective:     /83   Pulse 80   Temp 98.3 °F (36.8 °C) (Temporal)   Ht 5' 9\" (1.753 m)   Wt 242 lb (109.8 kg)   BMI 35.74 kg/m²     Physical Exam  Vitals signs reviewed. Constitutional:       General: He is not in acute distress. Appearance: Normal appearance. He is not ill-appearing. HENT:      Head: Normocephalic and atraumatic. Right Ear: External ear normal.      Left Ear: External ear normal.      Nose: Nose normal. No congestion or rhinorrhea. Mouth/Throat:      Mouth: Mucous membranes are moist.   Eyes:      Extraocular Movements: Extraocular movements intact. Conjunctiva/sclera: Conjunctivae normal.      Pupils: Pupils are equal, round, and reactive to light. Neck:      Musculoskeletal: Normal range of motion and neck supple. Pulmonary:      Effort: Pulmonary effort is normal. No respiratory distress. Musculoskeletal: Normal range of motion. Right lower leg: No edema. Left lower leg: No edema. Skin:     General: Skin is warm and dry. Neurological:      General: No focal deficit present. Mental Status: He is alert and oriented to person, place, and time.    Psychiatric:         Mood and Affect: Mood normal.         Behavior: Behavior normal.         Thought Content: Thought content normal.         Judgment: Judgment normal.         Labs Reviewed 3/5/2021:    Lab Results   Component Value Date    WBC 6.4 01/27/2021    HGB 13.6 (L) 01/27/2021    HCT 40.3 (L) 01/27/2021     01/27/2021    CHOL 205 (H) 02/21/2017    TRIG 189 (H) 02/21/2017    HDL 44 02/21/2017    ALT 21 03/01/2021    AST 28 03/01/2021     03/01/2021    K 3.9 03/01/2021     03/01/2021    CREATININE 0.91 03/01/2021    BUN 9 03/01/2021    CO2 27 03/01/2021    TSH 0.59 03/28/2018    INR 1.0 05/09/2018    LABA1C 5.7 10/23/2014       Assessment/Plan      1. Severe opioid use disorder (HCC)    - POCT Rapid Drug Screen  - Narcan at home  - Sustain from illicit drug use. Discussed potential for precipitated withdrawal and/or overdose in combination with Suboxone, understanding voiced  - OARRS reviewed, no discrepancies  - Hep C, HIV negative  - Continue counseling as scheduled  - Continue Suboxone 8 mg BID    2. S/P deep brain stimulator placement    - External Referral To Neurosurgery      Return in about 3 days (around 3/8/2021). Patient given educational materials - see patient instructions. Discussed use, benefit, and side effects of prescribed medications. All patient questions answered. Pt voiced understanding. Reviewed health maintenance.        Electronically signed MASSIEL Freedman - CNP on 3/5/21 at 10:03 AM EST

## 2021-03-05 NOTE — PROGRESS NOTES
Verbal order per Melodie Mathews CNP for urine drug screen. Positive for BUP,FEN. Verified results with Shelly Chong.

## 2021-03-08 ENCOUNTER — OFFICE VISIT (OUTPATIENT)
Dept: INTERNAL MEDICINE CLINIC | Age: 31
End: 2021-03-08
Payer: MEDICAID

## 2021-03-08 VITALS
HEIGHT: 69 IN | DIASTOLIC BLOOD PRESSURE: 66 MMHG | SYSTOLIC BLOOD PRESSURE: 100 MMHG | BODY MASS INDEX: 36.58 KG/M2 | HEART RATE: 70 BPM | TEMPERATURE: 97.4 F | WEIGHT: 247 LBS

## 2021-03-08 DIAGNOSIS — G89.29 CHRONIC INTRACTABLE HEADACHE, UNSPECIFIED HEADACHE TYPE: ICD-10-CM

## 2021-03-08 DIAGNOSIS — R51.9 CHRONIC INTRACTABLE HEADACHE, UNSPECIFIED HEADACHE TYPE: ICD-10-CM

## 2021-03-08 DIAGNOSIS — F11.20 SEVERE OPIOID USE DISORDER (HCC): Primary | ICD-10-CM

## 2021-03-08 PROCEDURE — 4004F PT TOBACCO SCREEN RCVD TLK: CPT | Performed by: NURSE PRACTITIONER

## 2021-03-08 PROCEDURE — G8427 DOCREV CUR MEDS BY ELIG CLIN: HCPCS | Performed by: NURSE PRACTITIONER

## 2021-03-08 PROCEDURE — 99214 OFFICE O/P EST MOD 30 MIN: CPT | Performed by: NURSE PRACTITIONER

## 2021-03-08 PROCEDURE — G8484 FLU IMMUNIZE NO ADMIN: HCPCS | Performed by: NURSE PRACTITIONER

## 2021-03-08 PROCEDURE — G8417 CALC BMI ABV UP PARAM F/U: HCPCS | Performed by: NURSE PRACTITIONER

## 2021-03-08 PROCEDURE — 80305 DRUG TEST PRSMV DIR OPT OBS: CPT | Performed by: NURSE PRACTITIONER

## 2021-03-08 RX ORDER — BUPRENORPHINE AND NALOXONE 8; 2 MG/1; MG/1
1 FILM, SOLUBLE BUCCAL; SUBLINGUAL 2 TIMES DAILY
Qty: 14 FILM | Refills: 0 | Status: SHIPPED | OUTPATIENT
Start: 2021-03-08 | End: 2021-03-12 | Stop reason: SDUPTHER

## 2021-03-08 RX ORDER — TIZANIDINE 4 MG/1
4 TABLET ORAL EVERY 8 HOURS PRN
Qty: 30 TABLET | Refills: 0 | Status: ON HOLD | OUTPATIENT
Start: 2021-03-08 | End: 2021-06-30 | Stop reason: SDUPTHER

## 2021-03-08 NOTE — PROGRESS NOTES
Verbal order per Read Gip SO for urine drug screen. Positive for BUP,FEN. Verified results with Chapo Jara RN.
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
## 2021-03-10 ASSESSMENT — ENCOUNTER SYMPTOMS
SINUS PAIN: 0
SHORTNESS OF BREATH: 0
PHOTOPHOBIA: 0
TROUBLE SWALLOWING: 0
SINUS PRESSURE: 0
DIARRHEA: 0
WHEEZING: 0
NAUSEA: 0
RHINORRHEA: 0
ABDOMINAL PAIN: 0
CONSTIPATION: 0
BLOOD IN STOOL: 0
COUGH: 0
ABDOMINAL DISTENTION: 0
SORE THROAT: 0
VOMITING: 0

## 2021-03-12 ENCOUNTER — NURSE ONLY (OUTPATIENT)
Dept: INTERNAL MEDICINE CLINIC | Age: 31
End: 2021-03-12
Payer: MEDICAID

## 2021-03-12 VITALS
DIASTOLIC BLOOD PRESSURE: 66 MMHG | TEMPERATURE: 97.4 F | HEIGHT: 69 IN | SYSTOLIC BLOOD PRESSURE: 100 MMHG | RESPIRATION RATE: 12 BRPM | BODY MASS INDEX: 36.58 KG/M2 | HEART RATE: 70 BPM | WEIGHT: 247 LBS

## 2021-03-12 DIAGNOSIS — F11.20 SEVERE OPIOID USE DISORDER (HCC): Primary | ICD-10-CM

## 2021-03-12 DIAGNOSIS — F25.9 SCHIZOAFFECTIVE DISORDER, UNSPECIFIED TYPE (HCC): ICD-10-CM

## 2021-03-12 PROCEDURE — 80305 DRUG TEST PRSMV DIR OPT OBS: CPT | Performed by: NURSE PRACTITIONER

## 2021-03-12 PROCEDURE — 99214 OFFICE O/P EST MOD 30 MIN: CPT | Performed by: NURSE PRACTITIONER

## 2021-03-12 RX ORDER — BUPRENORPHINE AND NALOXONE 8; 2 MG/1; MG/1
1 FILM, SOLUBLE BUCCAL; SUBLINGUAL 2 TIMES DAILY
Qty: 14 FILM | Refills: 0 | Status: SHIPPED | OUTPATIENT
Start: 2021-03-12 | End: 2021-03-12 | Stop reason: SDUPTHER

## 2021-03-12 RX ORDER — BUPRENORPHINE AND NALOXONE 8; 2 MG/1; MG/1
1 FILM, SOLUBLE BUCCAL; SUBLINGUAL 2 TIMES DAILY
Qty: 14 FILM | Refills: 0 | Status: SHIPPED | OUTPATIENT
Start: 2021-03-12 | End: 2021-03-19

## 2021-03-12 NOTE — PROGRESS NOTES
Per Verbal order of SO Moura for urine drug screen. Patient is positive for  BUP,FEN,METH,  . Verified with results with Gifty Zamudio LPN .

## 2021-03-12 NOTE — PROGRESS NOTES
[facility]IC[facility] MEDICATION MANAGEMENT  [address]  Dept: [phone]  Dept Fax: [phone]: [phone]     Visit Date:  3/12/2021    Patient:  Edgar Kay  YOB: 1990    HPI:     Chief Complaint   Patient presents with    Drug Problem     Pramod Mann presents today for medical evaluation of severe opioid use disorder and headaches.     I last seen him 4 days ago.     Urine positive for buprenorphine and fentanyl, he denies any use. Will send for comprehensive analysis.     Is requesting referral to a specific neurosurgeon, Dr. Doni Granda in [facility]. Pramod Mann reports that he was in an investigational study at [facility] for the treatment of PTSD, and had a brain stimulator placed. He then dropped out of the study, and is complaining of frequent headaches, palpitations, dizziness, and hot/cold flashes. He wants the stimulator removed. No neuro deficits noted on physical exam. Is paranoid again today, feels that someone is controlling his brain stimulator and causing him to experience symptoms at their discretion.      He is not taking his Cymbalta routinely. Is not sleeping well due to his headaches and pain. Has previously followed with psychiatry at [facility] and [place]. Encouraged to follow up at [place]. States that he was on Clonazepam in 6/2020, and it is the only thing that has helped his anxiety.       Urges and cravings controlled well with Suboxone 8 mg BID     Medications    Current Outpatient Medications:     tiZANidine (ZANAFLEX) 4 MG tablet, Take 1 tablet by mouth every 8 hours as needed (HEADACHES, BACK PAIN), Disp: 30 tablet, Rfl: 0    DULoxetine (CYMBALTA) 30 MG extended release capsule, Take 30 mg by mouth daily, Disp: , Rfl:     naloxone 4 MG/0.1ML LIQD nasal spray, 1 spray by Nasal route as needed for Opioid Reversal, Disp: 1 each, Rfl: 1    gabapentin (NEURONTIN) 600 MG tablet, Take 1 tablet by mouth 3 times daily for 30 days. , Disp: 90 tablet, Rfl: 0    cloNIDine (CATAPRES) 0.1 MG tablet, Take 1 tablet by mouth 3 times daily, Disp: 90 tablet, Rfl: 1    promethazine (PHENERGAN) 12.5 MG tablet, Take 1 tablet by mouth every 6 hours as needed for Nausea, Disp: 30 tablet, Rfl: 1    ibuprofen (ADVIL;MOTRIN) 600 MG tablet, Take 600 mg by mouth every 8 hours as needed for Pain, Disp: , Rfl:     traZODone (DESYREL) 100 MG tablet, Take 100 mg by mouth nightly, Disp: , Rfl:     Suvorexant (BELSOMRA) 20 MG TABS, Take 1 tablet by mouth nightly., Disp: , Rfl:     QUEtiapine (SEROQUEL) 200 MG tablet, Take 200 mg by mouth nightly, Disp: , Rfl:     meloxicam (MOBIC) 7.5 MG tablet, Take 1 tablet by mouth daily, Disp: 30 tablet, Rfl: 0    acetaminophen (TYLENOL) 500 MG tablet, Take 1 tablet by mouth every 4 hours as needed for Pain, Disp: 20 tablet, Rfl: 0    albuterol sulfate HFA (VENTOLIN HFA) 108 (90 Base) MCG/ACT inhaler, Inhale 2 puffs into the lungs every 6 hours as needed for Wheezing (Patient taking differently: Inhale 2 puffs into the lungs every 6 hours as needed for Wheezing ), Disp: 18 g, Rfl: 0    melatonin ER 1 MG TBCR tablet, Take 1 tablet by mouth nightly as needed (Sleep), Disp: 14 tablet, Rfl: 0    Fluticasone Furoate-Vilanterol (BREO ELLIPTA) 200-25 MCG/INH AEPB, Inhale 1 puff into the lungs daily, Disp: 1 each, Rfl: 0    vitamin D (ERGOCALCIFEROL) 67965 units capsule, Take 1 capsule by mouth once a week (Patient taking differently: Take 50,000 Units by mouth once a week Takes every Wed), Disp: 30 capsule, Rfl: 0    buprenorphine-naloxone (SUBOXONE) 8-2 MG FILM SL film, Place 1 Film under the tongue 2 times daily for 3 days. , Disp: 6 Film, Rfl: 0    hydrOXYzine (ATARAX) 25 MG tablet, Take 1 tablet by mouth every 8 hours as needed for Anxiety, Disp: 30 tablet, Rfl: 0    The patient is allergic to dicyclomine; famotidine; geodon [ziprasidone hcl]; haloperidol; iv dye [iodides]; ibuprofen; aspirin; dicyclomine hcl; flagyl [metronidazole]; iodine; and mirtazapine. Past Medical History  Laquita Guevara  has a past medical history of Anxiety, Arthritis, Asthma, Bipolar I disorder, most recent episode (or current) depressed, unspecified, Clostridium difficile infection, COPD (chronic obstructive pulmonary disease) ([address] Depression, Disease of blood and blood forming organ, Eczema, Fracture, metacarpal, Gastric ulcer, Gastritis, GERD (gastroesophageal reflux disease), GI bleed, H. pylori infection, H/O blood clots, Head injury, Headache, Insomnia, Juvenile rheumatoid arthritis ([address] Neuromuscular disorder ([address] PFAPA syndrome ([address] PUD (peptic ulcer disease), Rheumatoid arthritis ([address] Rheumatoid arthritis(714.0), Sleep apnea, 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[address] Substance abuse ([facility] [address] Suicidal ideation, Suicide attempt by hanging ([address] Tobacco dependence, Ulcerative colitis ([address] and UTI (urinary tract infection). Past Surgical History  The patient  has a past surgical history that includes Colonoscopy; bronchoscopy; other surgical history; Upper gastrointestinal endoscopy (2/4/16); pr egd transoral biopsy single/multiple (N/A, 3/20/2017); sigmoidoscopy (N/A, 3/20/2017); Cholecystectomy, laparoscopic (07/14/2017); pr esophagogastroduodenoscopy transoral diagnostic (N/A, 8/9/2017); pr colonoscopy w/biopsy single/multiple (8/9/2017); Abdomen surgery; Upper gastrointestinal endoscopy (N/A, 6/13/2018); Upper gastrointestinal endoscopy (N/A, 9/20/2018); and Endoscopy, colon, diagnostic. Family History  This patient's family history includes Alcohol Abuse in his father; Arthritis in his father and mother; Colon Cancer (age of onset: 43) in his paternal cousin; Depression in his brother and father; Diabetes in his brother and father; High Blood Pressure in his father; Mental Illness in his brother; Migraines in his brother, father, mother, and sister;  Other in his brother, brother, father, mother, and sister; Stroke in an other family member. Social History  Suyapa Pruitt  reports that he has been smoking cigarettes. He has a 5.00 pack-year smoking history. He has never used smokeless tobacco. He reports previous alcohol use. He reports that he does not use drugs. Health Maintenance:    Health Maintenance   Topic Date Due    Varicella vaccine (1 of 2 - 2-dose childhood series) Never done    COVID-19 Vaccine (1) Never done    DTaP/Tdap/Td vaccine (1 - Tdap) Never done    A1C test (Diabetic or Prediabetic)  10/23/2015    Flu vaccine (1) 09/01/2020    Pneumococcal 0-64 years Vaccine  Completed    Hepatitis C screen  Completed    HIV screen  Completed    Hepatitis A vaccine  Aged Out    Hepatitis B vaccine  Aged Out    Hib vaccine  Aged Out    Meningococcal (ACWY) vaccine  Aged Out       Subjective:      Review of Systems   Cardiovascular: Positive for palpitations. Neurological: Positive for dizziness and headaches. All other systems reviewed and are negative. Objective:     /66 (Site: Right Wrist, Position: Sitting, Cuff Size: Large Adult)   Pulse 70   Temp 97.4 °F (36.3 °C) (Temporal)   Resp 12   Ht 5' 9\" (1.753 m)   Wt 247 lb (112 kg)   BMI 36.48 kg/m²     Physical Exam  Vitals signs reviewed. Constitutional:       General: He is not in acute distress. Appearance: Normal appearance. He is not ill-appearing. HENT:      Head: Normocephalic and atraumatic. Right Ear: External ear normal.      Left Ear: External ear normal.      Nose: Nose normal. No congestion or rhinorrhea. Mouth/Throat:      Mouth: Mucous membranes are moist.   Eyes:      Extraocular Movements: Extraocular movements intact. Conjunctiva/sclera: Conjunctivae normal.      Pupils: Pupils are equal, round, and reactive to light. Neck:      Musculoskeletal: Normal range of motion and neck supple. Pulmonary:      Effort: Pulmonary effort is normal. No respiratory distress.    Musculoskeletal: Normal range of motion. Right lower leg: No edema. Left lower leg: No edema. Skin:     General: Skin is warm and dry. Neurological:      General: No focal deficit present. Mental Status: He is alert and oriented to person, place, and time. Psychiatric:         Mood and Affect: Mood normal.         Behavior: Behavior normal.         Thought Content: Thought content is paranoid. Judgment: Judgment normal.         Labs Reviewed 3/12/2021:    Lab Results   Component Value Date    WBC 6.4 01/27/2021    HGB 13.6 (L) 01/27/2021    HCT 40.3 (L) 01/27/2021     01/27/2021    CHOL 205 (H) 02/21/2017    TRIG 189 (H) 02/21/2017    HDL 44 02/21/2017    ALT 21 03/01/2021    AST 28 03/01/2021     03/01/2021    K 3.9 03/01/2021     03/01/2021    CREATININE 0.91 03/01/2021    BUN 9 03/01/2021    CO2 27 03/01/2021    TSH 0.59 03/28/2018    INR 1.0 05/09/2018    LABA1C 5.7 10/23/2014       Assessment/Plan      1. Severe opioid use disorder (HCC)    - POCT Rapid Drug Screen  - buprenorphine-naloxone (SUBOXONE) 8-2 MG FILM SL film; Place 1 Film under the tongue 2 times daily for 7 days. Dispense: 14 Film; Refill: 0  - Narcan at home  - Sustain from illicit drug use. Discussed potential for precipitated withdrawal and/or overdose in combination with Suboxone, understanding voiced  - OARRS reviewed, no discrepancies  - Hep C, HIV negative  - Continue counseling as scheduled  - Continue Suboxone 8 mg BID    2. Schizoaffective disorder, unspecified type ([facility] [address]    - [facility]      Return in about 10 days (around 3/22/2021). Patient given educational materials - see patient instructions. Discussed use, benefit, and side effects of prescribed medications. All patient questions answered. Pt voiced understanding. Reviewed health maintenance.        Electronically signed MASSIEL Freedman - CNP on 3/12/21 at 10:25 AM EST

## 2021-03-15 ENCOUNTER — TELEPHONE (OUTPATIENT)
Dept: INTERNAL MEDICINE CLINIC | Age: 31
End: 2021-03-15

## 2021-03-15 NOTE — TELEPHONE ENCOUNTER
Patient called stating he called [facility] and was told the soonest he could get an appointment is June. Per patient was informed by psychiatry office to contact you to fill his klonopin. Patient states he asked Susan Nugent and soonest they could see patient is in 2-3 weeks? ??    Tried to explain to patient that most likely [facility] would not fill this medication (as it is controlled substance and she prescribes him Suboxone ect) Patient was all over the place and not comprehending.      Patient kept stating \" I just need a little bit to get me through at least until my appointment next week with Sigrid\"

## 2021-03-15 NOTE — TELEPHONE ENCOUNTER
[facility] received a call from:    Name of Caller: Antione Carmen    Relationship to patient; self    Organization name: none    Best contact number: [phone]    Reason for call: Pt calling back in to state he tried o walk in to [facility] as requested and was told he needs an appointment and was directed to ask provider for refill, requesting I send another message and request someone from the office give him call directly at [phone]

## 2021-03-15 NOTE — TELEPHONE ENCOUNTER
He has not had Klonopin filled in 9 months. I am not prescribing it. Did he try to walk in to [facility]"" AT [facility]?

## 2021-03-17 ASSESSMENT — ENCOUNTER SYMPTOMS
VOMITING: 0
WHEEZING: 0
DIARRHEA: 0
PHOTOPHOBIA: 0
ABDOMINAL PAIN: 0
RHINORRHEA: 0
CONSTIPATION: 0
SHORTNESS OF BREATH: 0
TROUBLE SWALLOWING: 0
SORE THROAT: 0
SINUS PAIN: 0
COUGH: 0
BLOOD IN STOOL: 0
ABDOMINAL DISTENTION: 0
NAUSEA: 0
SINUS PRESSURE: 0

## 2021-03-22 ENCOUNTER — TELEPHONE (OUTPATIENT)
Dept: INTERNAL MEDICINE CLINIC | Age: 31
End: 2021-03-22

## 2021-03-22 NOTE — TELEPHONE ENCOUNTER
obot pt, calling to r/s appt with Dr. Feliberto Boas. Patient was transferred to ext [phone], no answer, pt advised to leave a VM.

## 2021-03-22 NOTE — TELEPHONE ENCOUNTER
Patient called stating he couldn't make his appointment today due to he didn't have a ride and he had to work. Patient states the soonest he can come in is Friday. Offered patient to set him up a ride and patient states \"it just won't work the soonest I can come in is Friday\" Patient states I cant come in sooner because of work and schoolwork. 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 He just kept repeating I can't come in until Friday and requesting Suboxone until his appointment. Explained to patient he may not get medication because he needs to be seen.

## 2021-03-22 NOTE — TELEPHONE ENCOUNTER
Patient informed he must come in for an appointment in order to get any Suboxone, patient stated okay. Declined on making a sooner appointment. He will keep is appointment for Friday.

## 2021-03-29 ENCOUNTER — NURSE ONLY (OUTPATIENT)
Dept: INTERNAL MEDICINE CLINIC | Age: 31
End: 2021-03-29
Payer: MEDICAID

## 2021-03-29 VITALS
TEMPERATURE: 98 F | SYSTOLIC BLOOD PRESSURE: 118 MMHG | HEIGHT: 69 IN | HEART RATE: 94 BPM | WEIGHT: 247 LBS | BODY MASS INDEX: 36.58 KG/M2 | DIASTOLIC BLOOD PRESSURE: 65 MMHG

## 2021-03-29 DIAGNOSIS — F11.20 SEVERE OPIOID USE DISORDER (HCC): Primary | ICD-10-CM

## 2021-03-29 PROCEDURE — 80305 DRUG TEST PRSMV DIR OPT OBS: CPT | Performed by: NURSE PRACTITIONER

## 2021-03-29 PROCEDURE — 99214 OFFICE O/P EST MOD 30 MIN: CPT | Performed by: NURSE PRACTITIONER

## 2021-03-29 RX ORDER — HYDROXYZINE HYDROCHLORIDE 25 MG/1
25 TABLET, FILM COATED ORAL EVERY 8 HOURS PRN
Qty: 30 TABLET | Refills: 0 | Status: SHIPPED | OUTPATIENT
Start: 2021-03-29 | End: 2021-04-08

## 2021-03-29 RX ORDER — BUPRENORPHINE AND NALOXONE 8; 2 MG/1; MG/1
1 FILM, SOLUBLE BUCCAL; SUBLINGUAL 2 TIMES DAILY
COMMUNITY
End: 2021-03-29 | Stop reason: SDUPTHER

## 2021-03-29 RX ORDER — BUPRENORPHINE AND NALOXONE 8; 2 MG/1; MG/1
1 FILM, SOLUBLE BUCCAL; SUBLINGUAL 2 TIMES DAILY
Qty: 6 FILM | Refills: 0 | Status: SHIPPED | OUTPATIENT
Start: 2021-03-29 | End: 2021-04-01 | Stop reason: SDUPTHER
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
## 2021-04-01 ENCOUNTER — OFFICE VISIT (OUTPATIENT)
Dept: INTERNAL MEDICINE CLINIC | Age: 31
End: 2021-04-01
Payer: MEDICAID

## 2021-04-01 VITALS
HEIGHT: 69 IN | WEIGHT: 243 LBS | TEMPERATURE: 97.4 F | HEART RATE: 97 BPM | SYSTOLIC BLOOD PRESSURE: 149 MMHG | BODY MASS INDEX: 35.99 KG/M2 | DIASTOLIC BLOOD PRESSURE: 83 MMHG

## 2021-04-01 DIAGNOSIS — F11.20 SEVERE OPIOID USE DISORDER (HCC): Primary | ICD-10-CM

## 2021-04-01 PROCEDURE — 80305 DRUG TEST PRSMV DIR OPT OBS: CPT | Performed by: NURSE PRACTITIONER

## 2021-04-01 PROCEDURE — 4004F PT TOBACCO SCREEN RCVD TLK: CPT | Performed by: NURSE PRACTITIONER

## 2021-04-01 PROCEDURE — 99213 OFFICE O/P EST LOW 20 MIN: CPT | Performed by: NURSE PRACTITIONER

## 2021-04-01 PROCEDURE — G8427 DOCREV CUR MEDS BY ELIG CLIN: HCPCS | Performed by: NURSE PRACTITIONER

## 2021-04-01 PROCEDURE — G8417 CALC BMI ABV UP PARAM F/U: HCPCS | Performed by: NURSE PRACTITIONER

## 2021-04-01 RX ORDER — NALOXONE HYDROCHLORIDE 4 MG/.1ML
1 SPRAY NASAL PRN
Qty: 1 EACH | Refills: 1 | Status: ON HOLD | OUTPATIENT
Start: 2021-04-01 | End: 2021-06-30 | Stop reason: SDUPTHER

## 2021-04-01 RX ORDER — BUPRENORPHINE AND NALOXONE 8; 2 MG/1; MG/1
1 FILM, SOLUBLE BUCCAL; SUBLINGUAL 2 TIMES DAILY
Qty: 8 FILM | Refills: 0 | Status: SHIPPED | OUTPATIENT
Start: 2021-04-01 | End: 2021-04-05

## 2021-04-01 NOTE — PROGRESS NOTES
Verbal order per [place] CNP for urine drug screen. Positive for BUP,MOP,FEN. Verified results with Natalie Antony LPN.

## 2021-04-01 NOTE — PROGRESS NOTES
[address] INTERNAL MEDICINE AND MEDICATION MANAGEMENT  [address]  Dept: [phone]  Dept Fax: [phone]: [phone]     Visit Date:  4/1/2021    Patient:  Essence Simental  YOB: 1990    HPI:     Chief Complaint   Patient presents with    Drug Problem     Zainab Aguilar presents today for medical evaluation of severe opioid use     I last seen him 2 weeks ago, was seen 3 days ago as a nurse visit. Urine positive for buprenorphine, fentanyl, and opiates     Last use Sunday.      Urges and cravings controlled well with Suboxone 8 mg BID     Medications    Current Outpatient Medications:     naloxone 4 MG/0.1ML LIQD nasal spray, 1 spray by Nasal route as needed for Opioid Reversal, Disp: 1 each, Rfl: 1    tiZANidine (ZANAFLEX) 4 MG tablet, Take 1 tablet by mouth every 8 hours as needed (HEADACHES, BACK PAIN), Disp: 30 tablet, Rfl: 0    DULoxetine (CYMBALTA) 30 MG extended release capsule, Take 30 mg by mouth daily, Disp: , Rfl:     naloxone 4 MG/0.1ML LIQD nasal spray, 1 spray by Nasal route as needed for Opioid Reversal, Disp: 1 each, Rfl: 1    cloNIDine (CATAPRES) 0.1 MG tablet, Take 1 tablet by mouth 3 times daily, Disp: 90 tablet, Rfl: 1    promethazine (PHENERGAN) 12.5 MG tablet, Take 1 tablet by mouth every 6 hours as needed for Nausea, Disp: 30 tablet, Rfl: 1    ibuprofen (ADVIL;MOTRIN) 600 MG tablet, Take 600 mg by mouth every 8 hours as needed for Pain, Disp: , Rfl:     traZODone (DESYREL) 100 MG tablet, Take 100 mg by mouth nightly, Disp: , Rfl:     Suvorexant (BELSOMRA) 20 MG TABS, Take 1 tablet by mouth nightly., Disp: , Rfl:     QUEtiapine (SEROQUEL) 200 MG tablet, Take 200 mg by mouth nightly, Disp: , Rfl:     meloxicam (MOBIC) 7.5 MG tablet, Take 1 tablet by mouth daily, Disp: 30 tablet, Rfl: 0    acetaminophen (TYLENOL) 500 MG tablet, Take 1 tablet by mouth every 4 hours as needed for Pain, Disp: 20 tablet, Rfl: 0    albuterol sulfate HFA (VENTOLIN HFA) 108 (90 Base) MCG/ACT inhaler, Inhale 2 puffs into the lungs every 6 hours as needed for Wheezing (Patient taking differently: Inhale 2 puffs into the lungs every 6 hours as needed for Wheezing ), Disp: 18 g, Rfl: 0    melatonin ER 1 MG TBCR tablet, Take 1 tablet by mouth nightly as needed (Sleep), Disp: 14 tablet, Rfl: 0    Fluticasone Furoate-Vilanterol (BREO ELLIPTA) 200-25 MCG/INH AEPB, Inhale 1 puff into the lungs daily, Disp: 1 each, Rfl: 0    vitamin D (ERGOCALCIFEROL) 25267 units capsule, Take 1 capsule by mouth once a week (Patient taking differently: Take 50,000 Units by mouth once a week Takes every Wed), Disp: 30 capsule, Rfl: 0    gabapentin (NEURONTIN) 600 MG tablet, Take 1 tablet by mouth 3 times daily for 30 days. , Disp: 90 tablet, Rfl: 0    The patient is allergic to dicyclomine; famotidine; geodon [ziprasidone hcl]; haloperidol; iv dye [iodides]; ibuprofen; aspirin; dicyclomine hcl; flagyl [metronidazole]; iodine; and mirtazapine. Past Medical History  Meredith Stephens  has a past medical history of Anxiety, Arthritis, Asthma, Bipolar I disorder, most recent episode (or current) depressed, unspecified, Clostridium difficile infection, COPD (chronic obstructive pulmonary disease) ([address] Depression, Disease of blood and blood forming organ, Eczema, Fracture, metacarpal, Gastric ulcer, Gastritis, GERD (gastroesophageal reflux disease), GI bleed, H. pylori infection, H/O blood clots, Head injury, Headache, Insomnia, Juvenile rheumatoid arthritis ([address] Neuromuscular disorder ([address] PFAPA syndrome ([address] PUD (peptic ulcer disease), Rheumatoid arthritis ([address] Rheumatoid arthritis(714.0), Sleep apnea, 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[address] Substance abuse ([address] Suicidal ideation, Suicide attempt by hanging ([address] Tobacco dependence, Ulcerative colitis ([address] and UTI (urinary tract infection).     Past Surgical History  The patient  has a past surgical negative. Objective:     BP (!) 149/83   Pulse 97   Temp 97.4 °F (36.3 °C) (Temporal)   Ht 5' 9\" (1.753 m)   Wt 243 lb (110.2 kg)   BMI 35.88 kg/m²     Physical Exam  Vitals signs reviewed. Constitutional:       General: He is not in acute distress. Appearance: Normal appearance. He is not ill-appearing. HENT:      Head: Normocephalic and atraumatic. Right Ear: External ear normal.      Left Ear: External ear normal.      Nose: Nose normal. No congestion or rhinorrhea. Mouth/Throat:      Mouth: Mucous membranes are moist.   Eyes:      Extraocular Movements: Extraocular movements intact. Conjunctiva/sclera: Conjunctivae normal.      Pupils: Pupils are equal, round, and reactive to light. Neck:      Musculoskeletal: Normal range of motion and neck supple. Pulmonary:      Effort: Pulmonary effort is normal. No respiratory distress. Musculoskeletal: Normal range of motion. Right lower leg: No edema. Left lower leg: No edema. Skin:     General: Skin is warm and dry. Neurological:      General: No focal deficit present. Mental Status: He is alert and oriented to person, place, and time. Psychiatric:         Mood and Affect: Mood normal.         Behavior: Behavior normal.         Thought Content: Thought content is paranoid. Judgment: Judgment normal.         Labs Reviewed 4/1/2021:    Lab Results   Component Value Date    WBC 6.4 01/27/2021    HGB 13.6 (L) 01/27/2021    HCT 40.3 (L) 01/27/2021     01/27/2021    CHOL 205 (H) 02/21/2017    TRIG 189 (H) 02/21/2017    HDL 44 02/21/2017    ALT 21 03/01/2021    AST 28 03/01/2021     03/01/2021    K 3.9 03/01/2021     03/01/2021    CREATININE 0.91 03/01/2021    BUN 9 03/01/2021    CO2 27 03/01/2021    TSH 0.59 03/28/2018    INR 1.0 05/09/2018    LABA1C 5.7 10/23/2014       Assessment/Plan      1.  Severe opioid use disorder (HCC)    - POCT Rapid Drug Screen  - buprenorphine-naloxone (SUBOXONE) 8-2 MG FILM SL film; Place 1 Film under the tongue 2 times daily for 4 days. Dispense: 8 Film; Refill: 0  - Narcan at home  - Sustain from illicit drug use. Discussed potential for precipitated withdrawal and/or overdose in combination with Suboxone, understanding voiced  - OARRS reviewed, no discrepancies  - Hep C, HIV negative  - Continue counseling as scheduled    Return in about 4 days (around 4/5/2021) for with [facility]. Patient given educational materials - see patient instructions. Discussed use, benefit, and side effects of prescribed medications. All patient questions answered. Pt voiced understanding. Reviewed health maintenance.        Electronically signed MASSIEL Clancy - CNP on 4/1/21 at 9:12 AM EDT

## 2021-04-05 ENCOUNTER — HOSPITAL ENCOUNTER (EMERGENCY)
Age: 31
Discharge: LEFT BEFORE DISCHARGE | End: 2021-04-06
Payer: MEDICARE

## 2021-04-05 VITALS
SYSTOLIC BLOOD PRESSURE: 151 MMHG | BODY MASS INDEX: 37.03 KG/M2 | RESPIRATION RATE: 17 BRPM | WEIGHT: 250 LBS | TEMPERATURE: 98.2 F | OXYGEN SATURATION: 95 % | DIASTOLIC BLOOD PRESSURE: 91 MMHG | HEIGHT: 69 IN | HEART RATE: 94 BPM

## 2021-04-05 LAB
EKG ATRIAL RATE: 100 BPM
EKG P AXIS: 65 DEGREES
EKG P-R INTERVAL: 132 MS
EKG Q-T INTERVAL: 316 MS
EKG QRS DURATION: 78 MS
EKG QTC CALCULATION (BAZETT): 407 MS
EKG R AXIS: 42 DEGREES
EKG T AXIS: 21 DEGREES
EKG VENTRICULAR RATE: 100 BPM

## 2021-04-05 PROCEDURE — 93005 ELECTROCARDIOGRAM TRACING: CPT | Performed by: EMERGENCY MEDICINE

## 2021-04-05 PROCEDURE — 4500000002 HC ER NO CHARGE

## 2021-04-06 NOTE — ED TRIAGE NOTES
Pt to ED today via triage desk with c/o drug overdose. Pt states he possibly took too many of his muscle relaxers, clonidine, percocet, tylenol. Pt states he was not trying to hurt himself, but forgot that he previously took the same medications. Pt states he is now dizzy. Medications were taken at 2220.

## 2021-04-06 NOTE — ED NOTES
Pt not found in room, lobby, or restroom.  at  states pt LWBS after RN triage.      [facility]  04/06/21 [id number]

## 2021-05-03 ENCOUNTER — TELEPHONE (OUTPATIENT)
Dept: INTERNAL MEDICINE CLINIC | Age: 31
End: 2021-05-03

## 2021-05-03 NOTE — TELEPHONE ENCOUNTER
Clayton Cid from [facility] at [facility] called stating they need a note stating patient is in good standings with Suboxone treatment in order to stay on Suboxone. He was last seen 4/1/21 was supposed to follow up in 4 days but never did and has no follow up scheduled. Not sure how we can write a letter saying he is in good standings if he hasn't ever followed up? ?

## 2021-05-27 ENCOUNTER — OFFICE VISIT (OUTPATIENT)
Dept: INTERNAL MEDICINE CLINIC | Age: 31
End: 2021-05-27
Payer: MEDICARE

## 2021-05-27 VITALS
HEIGHT: 69 IN | TEMPERATURE: 97.7 F | SYSTOLIC BLOOD PRESSURE: 138 MMHG | DIASTOLIC BLOOD PRESSURE: 83 MMHG | WEIGHT: 221 LBS | BODY MASS INDEX: 32.73 KG/M2 | HEART RATE: 89 BPM

## 2021-05-27 DIAGNOSIS — F11.20 SEVERE OPIOID USE DISORDER (HCC): Primary | ICD-10-CM

## 2021-05-27 PROCEDURE — G8417 CALC BMI ABV UP PARAM F/U: HCPCS | Performed by: NURSE PRACTITIONER

## 2021-05-27 PROCEDURE — G8427 DOCREV CUR MEDS BY ELIG CLIN: HCPCS | Performed by: NURSE PRACTITIONER

## 2021-05-27 PROCEDURE — 4004F PT TOBACCO SCREEN RCVD TLK: CPT | Performed by: NURSE PRACTITIONER

## 2021-05-27 PROCEDURE — 80305 DRUG TEST PRSMV DIR OPT OBS: CPT | Performed by: NURSE PRACTITIONER

## 2021-05-27 PROCEDURE — 99213 OFFICE O/P EST LOW 20 MIN: CPT | Performed by: NURSE PRACTITIONER

## 2021-05-27 RX ORDER — BUPRENORPHINE AND NALOXONE 8; 2 MG/1; MG/1
1 FILM, SOLUBLE BUCCAL; SUBLINGUAL 2 TIMES DAILY
Qty: 2 FILM | Refills: 0 | Status: CANCELLED | OUTPATIENT
Start: 2021-05-27 | End: 2021-05-28

## 2021-05-27 RX ORDER — BUPRENORPHINE HYDROCHLORIDE AND NALOXONE HYDROCHLORIDE DIHYDRATE 8; 2 MG/1; MG/1
1 TABLET SUBLINGUAL 2 TIMES DAILY
Qty: 9 TABLET | Refills: 0 | Status: SHIPPED | OUTPATIENT
Start: 2021-05-27 | End: 2021-06-01

## 2021-05-27 RX ORDER — BUPRENORPHINE HYDROCHLORIDE AND NALOXONE HYDROCHLORIDE DIHYDRATE 2; .5 MG/1; MG/1
1 TABLET SUBLINGUAL ONCE
Status: COMPLETED | OUTPATIENT
Start: 2021-05-27 | End: 2021-05-27

## 2021-05-27 RX ADMIN — BUPRENORPHINE HYDROCHLORIDE AND NALOXONE HYDROCHLORIDE DIHYDRATE 1 TABLET: 2; .5 TABLET SUBLINGUAL at 09:33

## 2021-05-27 RX ADMIN — BUPRENORPHINE HYDROCHLORIDE AND NALOXONE HYDROCHLORIDE DIHYDRATE 1 TABLET: 2; .5 TABLET SUBLINGUAL at 10:57

## 2021-05-27 SDOH — ECONOMIC STABILITY: FOOD INSECURITY: WITHIN THE PAST 12 MONTHS, THE FOOD YOU BOUGHT JUST DIDN'T LAST AND YOU DIDN'T HAVE MONEY TO GET MORE.: PATIENT DECLINED

## 2021-05-27 ASSESSMENT — PAIN SCALES - GENERAL: PAINLEVEL_OUTOF10: 0

## 2021-05-27 NOTE — PROGRESS NOTES
[facility]  [address]  Dept: [phone]  Dept Fax: [phone]: [phone]     Visit Date:  5/27/2021    Patient:  Quynh Obrien  YOB: 1990    HPI:     Chief Complaint   Patient presents with    Drug Problem     Ana Heart presents today for medical evaluation of severe opioid use disorder. I last seen him 7 weeks ago. States that after his last visit here, he went to [facility] and met with the Neurosurgeon whom he wanted to see in regards to his deep brain stimulator. He reports that the neurosurgeon does not want to remove it, due to the location, and would rather monitor it. Ana Heart reports he is going to see another provider in [place] on 6/3 for another opinion. At this point, I am unsure if there is a brain stimulator. I have tried to get records from [place], and they did not have any records. He also reports being in a detox program in [place]. Previously followed with a psychiatrist at [facility][1]. [facility] psychiatry declined referral. Will reach out to see if we can arrange a psychiatry visit at [facility]. His mental health issues need to be evaluated and treated. He is back in [facility], staying with a friend. Last use yesterday. Used \"a couple of pills early in the morning\". Urine positive for buprenorphine, fentanyl, and methamphetamines. Feels horrible- COWs 9     Discussed at length all MAT options including buprenorphine, naltrexone, and methadone. Wishes to pursue treatment with buprenorphine at this time. Discussed potential for overdose and/or precipitated withdrawal. Understanding voiced. Medications    Current Outpatient Medications:     buprenorphine-naloxone (SUBOXONE) 8-2 MG SUBL SL tablet, Place 1 tablet under the tongue 2 times daily for 5 days. , Disp: 9 tablet, Rfl: 0    naloxone 4 MG/0.1ML LIQD nasal spray, 1 spray by Nasal route as needed for Opioid Reversal, Disp: 1 each, Rfl: 1    tiZANidine (ZANAFLEX) 4 MG tablet, Take 1 tablet by mouth every 8 hours as needed (HEADACHES, BACK PAIN), Disp: 30 tablet, Rfl: 0    DULoxetine (CYMBALTA) 30 MG extended release capsule, Take 30 mg by mouth daily, Disp: , Rfl:     cloNIDine (CATAPRES) 0.1 MG tablet, Take 1 tablet by mouth 3 times daily, Disp: 90 tablet, Rfl: 1    promethazine (PHENERGAN) 12.5 MG tablet, Take 1 tablet by mouth every 6 hours as needed for Nausea, Disp: 30 tablet, Rfl: 1    ibuprofen (ADVIL;MOTRIN) 600 MG tablet, Take 600 mg by mouth every 8 hours as needed for Pain, Disp: , Rfl:     traZODone (DESYREL) 100 MG tablet, Take 100 mg by mouth nightly, Disp: , Rfl:     Suvorexant (BELSOMRA) 20 MG TABS, Take 1 tablet by mouth nightly., Disp: , Rfl:     QUEtiapine (SEROQUEL) 200 MG tablet, Take 200 mg by mouth nightly, Disp: , Rfl:     meloxicam (MOBIC) 7.5 MG tablet, Take 1 tablet by mouth daily, Disp: 30 tablet, Rfl: 0    acetaminophen (TYLENOL) 500 MG tablet, Take 1 tablet by mouth every 4 hours as needed for Pain, Disp: 20 tablet, Rfl: 0    albuterol sulfate HFA (VENTOLIN HFA) 108 (90 Base) MCG/ACT inhaler, Inhale 2 puffs into the lungs every 6 hours as needed for Wheezing (Patient taking differently: Inhale 2 puffs into the lungs every 6 hours as needed for Wheezing ), Disp: 18 g, Rfl: 0    melatonin ER 1 MG TBCR tablet, Take 1 tablet by mouth nightly as needed (Sleep), Disp: 14 tablet, Rfl: 0    Fluticasone Furoate-Vilanterol (BREO ELLIPTA) 200-25 MCG/INH AEPB, Inhale 1 puff into the lungs daily, Disp: 1 each, Rfl: 0    vitamin D (ERGOCALCIFEROL) 85410 units capsule, Take 1 capsule by mouth once a week (Patient taking differently: Take 50,000 Units by mouth once a week Takes every Wed), Disp: 30 capsule, Rfl: 0    gabapentin (NEURONTIN) 600 MG tablet, Take 1 tablet by mouth 3 times daily for 30 days. , Disp: 90 tablet, Rfl: 0    The patient is allergic to dicyclomine, famotidine, geodon [ziprasidone hcl], haloperidol, iv dye [iodides], ibuprofen, aspirin, dicyclomine hcl, flagyl [metronidazole], iodine, and mirtazapine. Past Medical History  Sherin Habermann  has a past medical history of Anxiety, Arthritis, Asthma, Bipolar I disorder, most recent episode (or current) depressed, unspecified, Clostridium difficile infection, COPD (chronic obstructive pulmonary disease) ([address] Depression, Disease of blood and blood forming organ, Eczema, Fracture, metacarpal, Gastric ulcer, Gastritis, GERD (gastroesophageal reflux disease), GI bleed, H. 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 Past Surgical History  The patient  has a past surgical history that includes Colonoscopy; bronchoscopy; other surgical history; Upper gastrointestinal endoscopy (2/4/16); pr egd transoral biopsy single/multiple (N/A, 3/20/2017); sigmoidoscopy (N/A, 3/20/2017); Cholecystectomy, laparoscopic (07/14/2017); pr esophagogastroduodenoscopy transoral diagnostic (N/A, 8/9/2017); pr colonoscopy w/biopsy single/multiple (8/9/2017); Abdomen surgery; Upper gastrointestinal endoscopy (N/A, 6/13/2018); Upper gastrointestinal endoscopy (N/A, 9/20/2018); and Endoscopy, colon, diagnostic.     Family History  This patient's family history includes Alcohol Abuse in his father; Arthritis in his father and mother; Colon Cancer (age of onset: 43) in his paternal cousin; Depression in his brother and father; Diabetes in his brother and father; High Blood Pressure in his father; Mental Illness in his brother; Migraines in his brother, father, mother, and sister; Other in his brother, brother, father, mother, and sister; Stroke in an other family member. Social History  Shandra   reports that he has been smoking cigarettes. He has a 5.00 pack-year smoking history. He has never used smokeless tobacco. He reports previous alcohol use. He reports that he does not use drugs. Health Maintenance:    Health Maintenance   Topic Date Due    Varicella vaccine (1 of 2 - 2-dose childhood series) Never done    COVID-19 Vaccine (1) Never done    DTaP/Tdap/Td vaccine (1 - Tdap) Never done    A1C test (Diabetic or Prediabetic)  10/23/2015    Annual Wellness Visit (AWV)  Never done    Flu vaccine (Season Ended) 09/01/2021    Pneumococcal 0-64 years Vaccine (2 of 2) 10/20/2055    Hepatitis C screen  Completed    HIV screen  Completed    Hepatitis A vaccine  Aged Out    Hepatitis B vaccine  Aged Out    Hib vaccine  Aged Out    Meningococcal (ACWY) vaccine  Aged Out       Subjective:      Review of Systems   Cardiovascular: Positive for palpitations. Neurological: Positive for dizziness and headaches. Psychiatric/Behavioral: Positive for dysphoric mood. Negative for suicidal ideas. All other systems reviewed and are negative. Objective:     /83   Pulse 89   Temp 97.7 °F (36.5 °C) (Temporal)   Ht 5' 9\" (1.753 m)   Wt 221 lb (100.2 kg)   BMI 32.64 kg/m²     Physical Exam  Vitals reviewed. Constitutional:       General: He is not in acute distress. Appearance: Normal appearance. He is not ill-appearing. HENT:      Head: Normocephalic and atraumatic. Right Ear: External ear normal.      Left Ear: External ear normal.      Nose: Nose normal. No congestion or rhinorrhea. Mouth/Throat:      Mouth: Mucous membranes are moist.   Eyes:      Extraocular Movements: Extraocular movements intact. Conjunctiva/sclera: Conjunctivae normal.      Pupils: Pupils are equal, round, and reactive to light.    Pulmonary: Effort: Pulmonary effort is normal. No respiratory distress. Musculoskeletal:         General: Normal range of motion. Cervical back: Normal range of motion and neck supple. Right lower leg: No edema. Left lower leg: No edema. Skin:     General: Skin is warm and dry. Neurological:      General: No focal deficit present. Mental Status: He is alert and oriented to person, place, and time. Psychiatric:         Mood and Affect: Mood normal.         Behavior: Behavior normal.         Thought Content: Thought content is paranoid. Judgment: Judgment normal.         Labs Reviewed 5/27/2021:    Lab Results   Component Value Date    WBC 6.4 01/27/2021    HGB 13.6 (L) 01/27/2021    HCT 40.3 (L) 01/27/2021     01/27/2021    CHOL 205 (H) 02/21/2017    TRIG 189 (H) 02/21/2017    HDL 44 02/21/2017    ALT 21 03/01/2021    AST 28 03/01/2021     03/01/2021    K 3.9 03/01/2021     03/01/2021    CREATININE 0.91 03/01/2021    BUN 9 03/01/2021    CO2 27 03/01/2021    TSH 0.59 03/28/2018    INR 1.0 05/09/2018    LABA1C 5.7 10/23/2014       Assessment/Plan      1. Severe opioid use disorder (HCC)    - POCT Rapid Drug Screen  - buprenorphine-naloxone (SUBOXONE) 8-2 MG SUBL SL tablet; Place 1 tablet under the tongue 2 times daily for 5 days. Dispense: 9 tablet; Refill: 0  - buprenorphine-naloxone (SUBOXONE) 2-0.5 MG SL tablet 1 tablet x2 given in office. In office induction completed. Tolerated well. No side effects.   - Narcan at home  - OARRS reviewed, no discrepancies  - Attend NA/AA meetings and/or arrange for individualized counseling   - Arrange visit with psychiatry ASAP    Return in about 5 days (around 6/1/2021). Patient given educational materials - see patient instructions. Discussed use, benefit, and side effects of prescribed medications. All patient questions answered. Pt voiced understanding. Reviewed health maintenance.        Electronically signed [facility]

## 2021-05-27 NOTE — PROGRESS NOTES
Verbal order per Michelle Lyons CNP for urine drug screen. Positive for BUP,METH,MOP,FEN. Verified results with Brennen Carpio LPN.

## 2021-06-11 ENCOUNTER — HOSPITAL ENCOUNTER (EMERGENCY)
Age: 31
Discharge: HOME | End: 2021-06-11
Payer: MEDICARE

## 2021-06-13 ENCOUNTER — APPOINTMENT (OUTPATIENT)
Dept: GENERAL RADIOLOGY | Age: 31
End: 2021-06-13
Payer: MEDICARE

## 2021-06-13 ENCOUNTER — HOSPITAL ENCOUNTER (EMERGENCY)
Age: 31
Discharge: HOME | End: 2021-06-13
Attending: EMERGENCY MEDICINE
Payer: MEDICARE

## 2021-06-13 VITALS
TEMPERATURE: 98 F | SYSTOLIC BLOOD PRESSURE: 142 MMHG | HEART RATE: 83 BPM | DIASTOLIC BLOOD PRESSURE: 96 MMHG | RESPIRATION RATE: 17 BRPM | OXYGEN SATURATION: 98 %

## 2021-06-13 DIAGNOSIS — R11.2 NON-INTRACTABLE VOMITING WITH NAUSEA, UNSPECIFIED VOMITING TYPE: ICD-10-CM

## 2021-06-13 DIAGNOSIS — R10.13 ABDOMINAL PAIN, EPIGASTRIC: Primary | ICD-10-CM

## 2021-06-13 LAB
ACETAMINOPHEN LEVEL: < 5 UG/ML (ref 0–20)
ALBUMIN SERPL-MCNC: 4.3 G/DL (ref 3.5–5.1)
ALP BLD-CCNC: 64 U/L (ref 38–126)
ALT SERPL-CCNC: 6 U/L (ref 11–66)
AMMONIA: 31 UMOL/L (ref 11–60)
AMPHETAMINE+METHAMPHETAMINE URINE SCREEN: POSITIVE
ANION GAP SERPL CALCULATED.3IONS-SCNC: 13 MEQ/L (ref 8–16)
AST SERPL-CCNC: 14 U/L (ref 5–40)
BARBITURATE QUANTITATIVE URINE: NEGATIVE
BASOPHILS # BLD: 0.4 %
BASOPHILS ABSOLUTE: 0 THOU/MM3 (ref 0–0.1)
BENZODIAZEPINE QUANTITATIVE URINE: NEGATIVE
BILIRUB SERPL-MCNC: 0.8 MG/DL (ref 0.3–1.2)
BILIRUBIN DIRECT: < 0.2 MG/DL (ref 0–0.3)
BILIRUBIN URINE: NEGATIVE
BLOOD, URINE: NEGATIVE
BUN BLDV-MCNC: 10 MG/DL (ref 7–22)
CALCIUM SERPL-MCNC: 9.2 MG/DL (ref 8.5–10.5)
CANNABINOID QUANTITATIVE URINE: NEGATIVE
CHARACTER, URINE: CLEAR
CHLORIDE BLD-SCNC: 99 MEQ/L (ref 98–111)
CO2: 23 MEQ/L (ref 23–33)
COCAINE METABOLITE QUANTITATIVE URINE: NEGATIVE
COLOR: ABNORMAL
CREAT SERPL-MCNC: 0.8 MG/DL (ref 0.4–1.2)
EKG ATRIAL RATE: 81 BPM
EKG P AXIS: 56 DEGREES
EKG P-R INTERVAL: 146 MS
EKG Q-T INTERVAL: 374 MS
EKG QRS DURATION: 86 MS
EKG QTC CALCULATION (BAZETT): 434 MS
EKG R AXIS: 57 DEGREES
EKG T AXIS: 38 DEGREES
EKG VENTRICULAR RATE: 81 BPM
EOSINOPHIL # BLD: 0.5 %
EOSINOPHILS ABSOLUTE: 0 THOU/MM3 (ref 0–0.4)
ERYTHROCYTE [DISTWIDTH] IN BLOOD BY AUTOMATED COUNT: 12.6 % (ref 11.5–14.5)
ERYTHROCYTE [DISTWIDTH] IN BLOOD BY AUTOMATED COUNT: 40.6 FL (ref 35–45)
ETHYL ALCOHOL, SERUM: < 0.01 %
GFR SERPL CREATININE-BSD FRML MDRD: > 90 ML/MIN/1.73M2
GLUCOSE BLD-MCNC: 94 MG/DL (ref 70–108)
GLUCOSE URINE: NEGATIVE MG/DL
HCT VFR BLD CALC: 45.9 % (ref 42–52)
HEMOGLOBIN: 15.4 GM/DL (ref 14–18)
IMMATURE GRANS (ABS): 0.01 THOU/MM3 (ref 0–0.07)
IMMATURE GRANULOCYTES: 0.1 %
KETONES, URINE: 15
LEUKOCYTE ESTERASE, URINE: NEGATIVE
LIPASE: 49.9 U/L (ref 5.6–51.3)
LYMPHOCYTES # BLD: 33.1 %
LYMPHOCYTES ABSOLUTE: 2.6 THOU/MM3 (ref 1–4.8)
MCH RBC QN AUTO: 29.4 PG (ref 26–33)
MCHC RBC AUTO-ENTMCNC: 33.6 GM/DL (ref 32.2–35.5)
MCV RBC AUTO: 87.6 FL (ref 80–94)
MONOCYTES # BLD: 6.7 %
MONOCYTES ABSOLUTE: 0.5 THOU/MM3 (ref 0.4–1.3)
NITRITE, URINE: NEGATIVE
NUCLEATED RED BLOOD CELLS: 0 /100 WBC
OPIATES, URINE: NEGATIVE
OSMOLALITY CALCULATION: 268.9 MOSMOL/KG (ref 275–300)
OXYCODONE: NEGATIVE
PH UA: 6.5 (ref 5–9)
PHENCYCLIDINE QUANTITATIVE URINE: NEGATIVE
PLATELET # BLD: 237 THOU/MM3 (ref 130–400)
PMV BLD AUTO: 10.8 FL (ref 9.4–12.4)
POTASSIUM REFLEX MAGNESIUM: 4 MEQ/L (ref 3.5–5.2)
PRO-BNP: 59.4 PG/ML (ref 0–450)
PROTEIN UA: NEGATIVE
RBC # BLD: 5.24 MILL/MM3 (ref 4.7–6.1)
SALICYLATE, SERUM: < 0.3 MG/DL (ref 2–10)
SEG NEUTROPHILS: 59.2 %
SEGMENTED NEUTROPHILS ABSOLUTE COUNT: 4.6 THOU/MM3 (ref 1.8–7.7)
SODIUM BLD-SCNC: 135 MEQ/L (ref 135–145)
SPECIFIC GRAVITY, URINE: 1.02 (ref 1–1.03)
TOTAL PROTEIN: 7.6 G/DL (ref 6.1–8)
TROPONIN T: < 0.01 NG/ML
UROBILINOGEN, URINE: 1 EU/DL (ref 0–1)
WBC # BLD: 7.8 THOU/MM3 (ref 4.8–10.8)

## 2021-06-13 PROCEDURE — 80307 DRUG TEST PRSMV CHEM ANLYZR: CPT

## 2021-06-13 PROCEDURE — 80048 BASIC METABOLIC PNL TOTAL CA: CPT

## 2021-06-13 PROCEDURE — 6370000000 HC RX 637 (ALT 250 FOR IP): Performed by: STUDENT IN AN ORGANIZED HEALTH CARE EDUCATION/TRAINING PROGRAM

## 2021-06-13 PROCEDURE — 81003 URINALYSIS AUTO W/O SCOPE: CPT

## 2021-06-13 PROCEDURE — 85025 COMPLETE CBC W/AUTO DIFF WBC: CPT

## 2021-06-13 PROCEDURE — 96374 THER/PROPH/DIAG INJ IV PUSH: CPT

## 2021-06-13 PROCEDURE — 80143 DRUG ASSAY ACETAMINOPHEN: CPT

## 2021-06-13 PROCEDURE — 83690 ASSAY OF LIPASE: CPT

## 2021-06-13 PROCEDURE — 93010 ELECTROCARDIOGRAM REPORT: CPT | Performed by: NUCLEAR MEDICINE

## 2021-06-13 PROCEDURE — 99283 EMERGENCY DEPT VISIT LOW MDM: CPT

## 2021-06-13 PROCEDURE — 82140 ASSAY OF AMMONIA: CPT

## 2021-06-13 PROCEDURE — 84484 ASSAY OF TROPONIN QUANT: CPT

## 2021-06-13 PROCEDURE — 36415 COLL VENOUS BLD VENIPUNCTURE: CPT

## 2021-06-13 PROCEDURE — 96372 THER/PROPH/DIAG INJ SC/IM: CPT

## 2021-06-13 PROCEDURE — 82077 ASSAY SPEC XCP UR&BREATH IA: CPT

## 2021-06-13 PROCEDURE — 80179 DRUG ASSAY SALICYLATE: CPT

## 2021-06-13 PROCEDURE — 6360000002 HC RX W HCPCS: Performed by: STUDENT IN AN ORGANIZED HEALTH CARE EDUCATION/TRAINING PROGRAM

## 2021-06-13 PROCEDURE — 71046 X-RAY EXAM CHEST 2 VIEWS: CPT

## 2021-06-13 PROCEDURE — 74019 RADEX ABDOMEN 2 VIEWS: CPT

## 2021-06-13 PROCEDURE — 96375 TX/PRO/DX INJ NEW DRUG ADDON: CPT

## 2021-06-13 PROCEDURE — 6360000002 HC RX W HCPCS

## 2021-06-13 PROCEDURE — 83880 ASSAY OF NATRIURETIC PEPTIDE: CPT

## 2021-06-13 PROCEDURE — 2580000003 HC RX 258: Performed by: STUDENT IN AN ORGANIZED HEALTH CARE EDUCATION/TRAINING PROGRAM

## 2021-06-13 PROCEDURE — 80076 HEPATIC FUNCTION PANEL: CPT

## 2021-06-13 PROCEDURE — 93005 ELECTROCARDIOGRAM TRACING: CPT | Performed by: STUDENT IN AN ORGANIZED HEALTH CARE EDUCATION/TRAINING PROGRAM

## 2021-06-13 RX ORDER — DIPHENHYDRAMINE HYDROCHLORIDE 50 MG/ML
25 INJECTION INTRAMUSCULAR; INTRAVENOUS ONCE
Status: COMPLETED | OUTPATIENT
Start: 2021-06-13 | End: 2021-06-13

## 2021-06-13 RX ORDER — 0.9 % SODIUM CHLORIDE 0.9 %
1000 INTRAVENOUS SOLUTION INTRAVENOUS ONCE
Status: COMPLETED | OUTPATIENT
Start: 2021-06-13 | End: 2021-06-13

## 2021-06-13 RX ORDER — PROMETHAZINE HYDROCHLORIDE 25 MG/ML
12.5 INJECTION, SOLUTION INTRAMUSCULAR; INTRAVENOUS ONCE
Status: COMPLETED | OUTPATIENT
Start: 2021-06-13 | End: 2021-06-13

## 2021-06-13 RX ORDER — DIPHENHYDRAMINE HYDROCHLORIDE 50 MG/ML
INJECTION INTRAMUSCULAR; INTRAVENOUS
Status: COMPLETED
Start: 2021-06-13 | End: 2021-06-13

## 2021-06-13 RX ORDER — ONDANSETRON 2 MG/ML
4 INJECTION INTRAMUSCULAR; INTRAVENOUS ONCE
Status: COMPLETED | OUTPATIENT
Start: 2021-06-13 | End: 2021-06-13

## 2021-06-13 RX ADMIN — ONDANSETRON 4 MG: 2 INJECTION INTRAMUSCULAR; INTRAVENOUS at 12:53

## 2021-06-13 RX ADMIN — DIPHENHYDRAMINE HYDROCHLORIDE 25 MG: 50 INJECTION INTRAMUSCULAR; INTRAVENOUS at 13:01

## 2021-06-13 RX ADMIN — PROMETHAZINE HYDROCHLORIDE 12.5 MG: 25 INJECTION INTRAMUSCULAR; INTRAVENOUS at 14:16

## 2021-06-13 RX ADMIN — SODIUM CHLORIDE 1000 ML: 9 INJECTION, SOLUTION INTRAVENOUS at 12:53

## 2021-06-13 RX ADMIN — LIDOCAINE HYDROCHLORIDE: 20 SOLUTION ORAL; TOPICAL at 14:36

## 2021-06-13 ASSESSMENT — PAIN DESCRIPTION - PAIN TYPE
TYPE: ACUTE PAIN
TYPE: ACUTE PAIN

## 2021-06-13 ASSESSMENT — PAIN DESCRIPTION - PROGRESSION: CLINICAL_PROGRESSION: GRADUALLY WORSENING

## 2021-06-13 ASSESSMENT — ENCOUNTER SYMPTOMS
ABDOMINAL PAIN: 1
SINUS PAIN: 0
COUGH: 0
VOMITING: 1
CONSTIPATION: 0
BACK PAIN: 0
DIARRHEA: 1
SHORTNESS OF BREATH: 0
EYE PAIN: 0
NAUSEA: 1

## 2021-06-13 ASSESSMENT — PAIN SCALES - GENERAL
PAINLEVEL_OUTOF10: 10
PAINLEVEL_OUTOF10: 10

## 2021-06-13 ASSESSMENT — PAIN DESCRIPTION - LOCATION
LOCATION: ABDOMEN
LOCATION: ABDOMEN

## 2021-06-13 ASSESSMENT — PAIN DESCRIPTION - FREQUENCY: FREQUENCY: CONTINUOUS

## 2021-06-13 ASSESSMENT — PAIN DESCRIPTION - ONSET: ONSET: ON-GOING

## 2021-06-13 ASSESSMENT — PAIN DESCRIPTION - DESCRIPTORS: DESCRIPTORS: ACHING

## 2021-06-13 NOTE — ED TRIAGE NOTES
Pt presents to the ED for abdominal pain that started 2 days ago after drinking a pop. Pt states he thinks he is having an allergic reaction to the pop because his stomach hurts and his arms are itching. Pt states he has had the pop before.

## 2021-06-13 NOTE — ED NOTES
Patient once again noted to put on call light and this RN to room. Patient noted to have unhooked his IV infusion again. Dr. Staci Mckeon notified. IV fluids discontinued. Patient is resting in bed with easy and unlabored respirations. Call light in reach. Side rails up x2. Patient denies further complaints or concerns. Will monitor.         Radha Mckeon RN  06/13/21 [id number]

## 2021-06-13 NOTE — ED PROVIDER NOTES
[facility] ENCOUNTER          Pt Name: Javy Patel  MRN: [id number]  [place] 1990  Date of evaluation: 6/13/2021  Treating Resident Physician: Shauna Norris MD  Supervising Physician: Dr. Torie Herrera MD    [address] [place] [address]       Chief Complaint   Patient presents with    Abdominal Pain     History obtained from the patient. HISTORY OF PRESENT ILLNESS    HPI  Javy Patel is a 27 y.o. male who presents to the emergency department for evaluation of possible ingestion. Patient states about 20 minutes prior to emergency department arrival he \"thinks I drank rat poison\". Patient states he lives at home with some friends and he drank a pop from the fridge and began having abdominal pain, nausea and vomiting, and diarrhea afterwards. Patient states his abdominal pain is more epigastric and periumbilical and states it is sharp stabbing burning aching and constant 10 out of 10. Patient denies any radiation. Patient mentions 1 episode of nonbilious nonbloody vomiting. Patient had one episode of nonbloody diarrhea. Patient also mentions dysuria without drainage or discharge. Patient states feels like his veins are burning. Patient states it feels like he might be having allergic reaction but when asked if he had taken anything new or out of the ordinary patient says no. Patient denies any suicidal homicidal ideation. Patient denies any hallucinations. Patient does not appear to be responding to any external stimuli. Patient denies any headache fever chills. Patient denies any cough chest pain shortness of breath. Patient denies any constipation leg swelling.     Chart review shows patient has history of opioid dependence, schizoaffective disorder, IBS  Patient states he has had a prior cholecystectomy approximately 3 years ago at [facility]    Patient denies any new headache fever chills cough chest pain shortness of breath constipation leg swelling. The patient has no other acute complaints at this time. REVIEW OF SYSTEMS   Review of Systems   Constitutional: Negative for chills, diaphoresis and fever. HENT: Negative for ear pain and sinus pain. Eyes: Negative for pain. Respiratory: Negative for cough and shortness of breath. Cardiovascular: Negative for chest pain and leg swelling. Gastrointestinal: Positive for abdominal pain, diarrhea, nausea and vomiting. Negative for constipation. Genitourinary: Positive for dysuria. Negative for discharge and flank pain. Musculoskeletal: Negative for back pain and neck pain. Skin: Negative for wound. Neurological: Negative for headaches. Psychiatric/Behavioral: Negative for confusion.          PAST MEDICAL AND SURGICAL HISTORY     Past Medical History:   Diagnosis Date    Anxiety     Arthritis     Asthma     Bipolar I disorder, most recent episode (or current) depressed, unspecified 9/12/2014    Clostridium difficile infection     COPD (chronic obstructive pulmonary disease) ([address]     Depression     Disease of blood and blood forming organ     Eczema     Fracture, metacarpal     R 4th and 5th    Gastric ulcer     Gastritis 06/13/2018    GERD (gastroesophageal reflux disease)     GI bleed     H. 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[address]     Substance abuse ([address]     Suicidal ideation     Suicide attempt by hanging ([address]     Tobacco dependence     Ulcerative colitis ([address]     UTI (urinary tract infection)      Past Surgical History:   Procedure Laterality Date    ABDOMEN SURGERY      upper GI scope 7/7/2015    BRONCHOSCOPY      CHOLECYSTECTOMY, LAPAROSCOPIC 07/14/2017    surgery performed at [address], COLON, DIAGNOSTIC      OTHER SURGICAL HISTORY      lumbar puncture    NE COLONOSCOPY W/BIOPSY SINGLE/MULTIPLE  8/9/2017    COLONOSCOPY WITH BIOPSY performed by Pearline Blizzard, MD at [facility]    NE EGD TRANSORAL BIOPSY SINGLE/MULTIPLE N/A 3/20/2017    EGD BIOPSY performed by Jazmin Williamson MD at [facility]    NE ESOPHAGOGASTRODUODENOSCOPY TRANSORAL DIAGNOSTIC N/A 8/9/2017    EGD ESOPHAGOGASTRODUODENOSCOPY performed by Pearline Blizzard, MD at [facility] N/A 3/20/2017    SIGMOIDOSCOPY DIAGNOSTIC FLEXIBLE performed by Jazmin Williamson MD at [facility]  2/4/16    UPPER GASTROINTESTINAL ENDOSCOPY N/A 6/13/2018    GASTRITIS    UPPER GASTROINTESTINAL ENDOSCOPY N/A 9/20/2018    EGD BIOPSY performed by Fredis Hyde MD at [facility]   No current facility-administered medications for this encounter. Current Outpatient Medications:     naloxone 4 MG/0.1ML LIQD nasal spray, 1 spray by Nasal route as needed for Opioid Reversal, Disp: 1 each, Rfl: 1    tiZANidine (ZANAFLEX) 4 MG tablet, Take 1 tablet by mouth every 8 hours as needed (HEADACHES, BACK PAIN), Disp: 30 tablet, Rfl: 0    DULoxetine (CYMBALTA) 30 MG extended release capsule, Take 30 mg by mouth daily, Disp: , Rfl:     gabapentin (NEURONTIN) 600 MG tablet, Take 1 tablet by mouth 3 times daily for 30 days. , Disp: 90 tablet, Rfl: 0    cloNIDine (CATAPRES) 0.1 MG tablet, Take 1 tablet by mouth 3 times daily, Disp: 90 tablet, Rfl: 1    promethazine (PHENERGAN) 12.5 MG tablet, Take 1 tablet by mouth every 6 hours as needed for Nausea, Disp: 30 tablet, Rfl: 1    ibuprofen (ADVIL;MOTRIN) 600 MG tablet, Take 600 mg by mouth every 8 hours as needed for Pain, Disp: , Rfl:     traZODone (DESYREL) 100 MG tablet, Take 100 mg by mouth nightly, Disp: , Rfl:    Suvorexant (BELSOMRA) 20 MG TABS, Take 1 tablet by mouth nightly., Disp: , Rfl:     QUEtiapine (SEROQUEL) 200 MG tablet, Take 200 mg by mouth nightly, Disp: , Rfl:     meloxicam (MOBIC) 7.5 MG tablet, Take 1 tablet by mouth daily, Disp: 30 tablet, Rfl: 0    acetaminophen (TYLENOL) 500 MG tablet, Take 1 tablet by mouth every 4 hours as needed for Pain, Disp: 20 tablet, Rfl: 0    albuterol sulfate HFA (VENTOLIN HFA) 108 (90 Base) MCG/ACT inhaler, Inhale 2 puffs into the lungs every 6 hours as needed for Wheezing (Patient taking differently: Inhale 2 puffs into the lungs every 6 hours as needed for Wheezing ), Disp: 18 g, Rfl: 0    melatonin ER 1 MG TBCR tablet, Take 1 tablet by mouth nightly as needed (Sleep), Disp: 14 tablet, Rfl: 0    Fluticasone Furoate-Vilanterol (BREO ELLIPTA) 200-25 MCG/INH AEPB, Inhale 1 puff into the lungs daily, Disp: 1 each, Rfl: 0    vitamin D (ERGOCALCIFEROL) 20662 units capsule, Take 1 capsule by mouth once a week (Patient taking differently: Take 50,000 Units by mouth once a week Takes every Wed), Disp: 30 capsule, Rfl: 0      SOCIAL HISTORY     Social History     Social History Narrative    ** Merged History Encounter **          Social History     Tobacco Use    Smoking status: Current Every Day Smoker     Packs/day: 1.00     Years: 5.00     Pack years: 5.00     Types: Cigarettes    Smokeless tobacco: Never Used   Vaping Use    Vaping Use: Former   Substance Use Topics    Alcohol use: Not Currently    Drug use: No     Comment: last used pain fill/fentanyl 2/18/21         ALLERGIES     Allergies   Allergen Reactions    Dicyclomine Anaphylaxis    Famotidine Anaphylaxis    Geodon [Ziprasidone Hcl] Anaphylaxis    Haloperidol Anaphylaxis     Other reaction(s): throat swells  Throat swells      Iv Dye [Iodides] Nausea And Vomiting and Rash     Ok to take with benadryl - itching only no rash or anaphylaxis  Needs benadryl.     Ibuprofen      Other reaction(s): GI Intolerance  Aspirin      Pressure in skin    Dicyclomine Hcl      08--allergy removed-pt sts today it is his allergy  Other reaction(s): Itching    Flagyl [Metronidazole] Swelling     Patient says he isn't allergic to this med 08-  Today 08- it is an allergy  02/13/19 states he is not allergic     Iodine      Other reaction(s): Hives, Itching, Nausea/Vomiting    Mirtazapine          FAMILY HISTORY     Family History   Problem Relation Age of Onset    Diabetes Father     Alcohol Abuse Father     Depression Father     Arthritis Father     High Blood Pressure Father     Other Father         aneurysm & epilepsy    Migraines Father     Arthritis Mother     Other Mother         aneurysm & epilepsy    Migraines Mother     Diabetes Brother         Aunt and uncles    Depression Brother     Mental Illness Brother     Other Brother         epilepsy    Migraines Brother     Stroke Other         Uncle    Other Brother         murdered Oct 6th, 2014    Colon Cancer Paternal Cousin 43    Other Sister         epilepsy    Migraines Sister          PREVIOUS RECORDS   Previous records reviewed: Patient last visited the emergency department on 6/11/2021 but left without being triaged. PHYSICAL EXAM     ED Triage Vitals [06/13/21 0929]   BP Temp Temp Source Pulse Resp SpO2 Height Weight   132/78 98 °F (36.7 °C) Oral 95 18 97 % -- --     Initial vital signs and nursing assessment reviewed and vitals are/show: abnormal from Borderline hypertension, borderline tachycardia. Pulsoximetry is normal per my interpretation. Additional Vital Signs:  Vitals:    06/13/21 1253   BP: (!) 142/96   Pulse: 83   Resp: 17   Temp:    SpO2: 98%       Physical Exam  Constitutional:       General: He is not in acute distress. Appearance: Normal appearance. He is not ill-appearing, toxic-appearing or diaphoretic. HENT:      Head: Normocephalic and atraumatic.       Right Ear: External ear normal.      Left Ear: External ear normal.   Eyes:      General: No scleral icterus. Right eye: No discharge. Left eye: No discharge. Extraocular Movements: Extraocular movements intact. Pupils: Pupils are equal, round, and reactive to light. Cardiovascular:      Rate and Rhythm: Normal rate and regular rhythm. Pulmonary:      Effort: Pulmonary effort is normal. No respiratory distress. Breath sounds: Normal breath sounds. No stridor. No wheezing, rhonchi or rales. Chest:      Chest wall: No tenderness. Abdominal:      General: Abdomen is flat. There is no distension. Palpations: Abdomen is soft. Tenderness: There is abdominal tenderness. There is no guarding or rebound. Comments: Tenderness to palpation epigastric and periumbilical region  No rigidity  No guarding  No peritoneal signs   Musculoskeletal:      Cervical back: Neck supple. Right lower leg: No edema. Left lower leg: No edema. Skin:     General: Skin is warm and dry. Neurological:      Mental Status: He is alert and oriented to person, place, and time. Comments: Patient appears to take significant amount of time in between answering questions  He is alert and oriented x3 with GCS of 15 but is contradicting himself during examination. Spontaneously moves all 4 extremities  No observed gross focal neurological findings  Patient does not appear to be responding to any external stimuli   Psychiatric:         Mood and Affect: Mood normal.         Judgment: Judgment normal.             MEDICAL DECISION MAKING   Initial Assessment:     Abdominal pain    Differential diagnoses include but not limited to: Ingestion, drug intoxication, drug withdrawal, electrolyte abnormality, arrhythmia, gastritis, peptic ulcer disease, GERD, pancreatitis, psychiatric illness. Plan:     EKG  Labs.   Imaging: Chest x-ray, abdominal x-ray  [facility] consult  IV fluids  Antiemetic  Benadryl  Phenergan  GI cocktail  Discharge      Patient was seen and evaluated in the emergency department for abdominal pain. Patient has a psychiatric history and chart review shows that he is notorious for noncompliance so I consulted [facility] after he mentioned concerns for ingestion of rat poisoning. Patient denied any suicidal or homicidal ideation and denied any hallucinations. While in emergency department, patient was disrupting nursing staff and seeking attention. Multiple times he had unhooked his IV fluids even though he had requested them initially. Patient also requests for Benadryl, Phenergan and additional medications while in the emergency department. Patient's lab work was unremarkable and imaging was unremarkable. [facility] saw the patient cleared him from their perspective and gave him resources for follow-up. Prior to discharge, patient did test positive for methamphetamine vitals were stable in emergency department. Patient tolerated p.o. in the ED. Patient discharged.             ED RESULTS   Laboratory results:  Labs Reviewed   HEPATIC FUNCTION PANEL - Abnormal; Notable for the following components:       Result Value    ALT 6 (*)     All other components within normal limits   URINE RT REFLEX TO CULTURE - Abnormal; Notable for the following components:    Ketones, Urine 15 (*)     Color, UA DK YELLOW (*)     All other components within normal limits   SALICYLATE LEVEL - Abnormal; Notable for the following components:    Salicylate, Serum < 0.3 (*)     All other components within normal limits   OSMOLALITY - Abnormal; Notable for the following components:    Osmolality Calc 268.9 (*)     All other components within normal limits   CBC WITH AUTO DIFFERENTIAL   BASIC METABOLIC PANEL W/ REFLEX TO MG FOR LOW K   LIPASE   TROPONIN   BRAIN NATRIURETIC PEPTIDE   ETHANOL   URINE DRUG SCREEN   AMMONIA   ACETAMINOPHEN LEVEL   ANION GAP   GLOMERULAR FILTRATION RATE, ESTIMATED       Radiologic studies results:  XR CHEST (2 VW) Final Result   1. No interval change since previous study dated 26 January 2021, no acute cardiopulmonary disease. .               **This report has been created using voice recognition software. It may contain minor errors which are inherent in voice recognition technology. **      Final report electronically signed by DR Heavenly Hernández on 6/13/2021 10:59 AM      XR ABDOMEN (2 VIEWS)   Final Result   Nonspecific abdomen. .               **This report has been created using voice recognition software. It may contain minor errors which are inherent in voice recognition technology. **      Final report electronically signed by DR Heavenly Hernández on 6/13/2021 11:04 AM          ED Medications administered this visit:   Medications   0.9 % sodium chloride bolus (0 mLs Intravenous Stopped 6/13/21 1332)   ondansetron (ZOFRAN) injection 4 mg (4 mg Intravenous Given 6/13/21 1253)   diphenhydrAMINE (BENADRYL) injection 25 mg (25 mg Intravenous Given 6/13/21 1301)   aluminum & magnesium hydroxide-simethicone (MAALOX) 30 mL, lidocaine viscous hcl (XYLOCAINE) 5 mL (GI COCKTAIL) ( Oral Given 6/13/21 1436)   promethazine (PHENERGAN) injection 12.5 mg (12.5 mg Intramuscular Given 6/13/21 1416)         ED COURSE     ED Course as of Jun 13 1704   Sun Jun 13, 2021   1116 IMPRESSION:  Nonspecific abdomen. .       [CR]   1116 IMPRESSION:  1. No interval change since previous study dated 26 January 2021, no acute cardiopulmonary disease. .    [CR]   1218 EKG shows no Qtc prolongation, zofran ordered. [CR]   1272 [facility] will see patient    [CR]   [phone] with Chemistry, regarding patients labs; apparently no samples have been sent; will address with nursing staff. [CR]   1336 Labs unremarkable.    [CR]   179 Per nursing staff, patient is using his call light numerous times and contacted IV infusion multiple times, IV fluids discontinued. [CR]   9873 Patient requesting Phenergan and mentions his abdomen still hurts.   GI Cocktail ordered; phenergan ordered. [CR]   1405 [facility] cleared patient and provided resources    [CR]   1432 Amphetamines tested positive    [CR]   1503 Discharge    [CR]      ED Course User Index  [CR] Starr Reynolds MD        Strict return precautions and follow up instructions were discussed with the patient prior to discharge, with which the patient agrees. MEDICATION CHANGES     Discharge Medication List as of 6/13/2021  2:21 PM            FINAL DISPOSITION     Final diagnoses:   Abdominal pain, epigastric   Non-intractable vomiting with nausea, unspecified vomiting type     Condition: condition: stable  Dispo: Discharge to home      This transcription was electronically signed. Parts of this transcriptions may have been dictated by use of voice recognition software and electronically transcribed, and parts may have been transcribed with the assistance of an ED scribe. The transcription may contain errors not detected in proofreading. Please refer to my supervising physician's documentation if my documentation differs.     Electronically Signed: Starr Reynolds MD, 06/13/21, 5:04 PM         Starr Reynolds MD  Resident  06/13/21 [id number]

## 2021-06-13 NOTE — ED PROVIDER NOTES
ATTENDING NOTE:    I supervised and discussed the history, physical exam and the management of this patient with the resident. I reviewed the resident's note and agree with the documented findings and plan of care. Please see my additional note. Patient states that he is here because he has been having some abdominal discomfort and he vomited once prior to arrival, states that he thinks he either is having an allergic reaction to something to drink or that there was a poison and something that he drank. He states he just drank an open pop that was in the refrigerator at his house. He lives with a few other friends, states he does not have any feelings of persecution. Denies paranoia. Denies delusions, suicidal or homicidal ideation. Denies auditory or visual hallucinations. He states that this morning he took his usual amount of medications, did not take any additional pills or anything over-the-counter. On exam, abdomen is soft, nontender, nondistended, with normal active bowel sounds. Labs and x-ray ordered and reviewed. [facility] also stopped to see patient and provide him with resources. Patient's drug screen positive for methamphetamine. We will encourage him to continue to follow closely with Dr. Rosibel Andino and his outpatient care team.    I personally saw and examined the patient. I have reviewed and agree with the resident's findings, including all diagnostic interpretations and treatment plans as written. I was present for the key portion of any procedures performed and the inclusive time noted in any critical care statement.     Electronically verified by Camron Kenyon MD  06/13/21 1573

## 2021-06-18 ENCOUNTER — HOSPITAL ENCOUNTER (EMERGENCY)
Age: 31
Discharge: HOME | End: 2021-06-18
Attending: EMERGENCY MEDICINE
Payer: MEDICARE

## 2021-06-18 ENCOUNTER — HOSPITAL ENCOUNTER (EMERGENCY)
Age: 31
Discharge: LEFT BEFORE DISCHARGE | End: 2021-06-18
Payer: MEDICARE

## 2021-06-18 ENCOUNTER — APPOINTMENT (OUTPATIENT)
Dept: CT IMAGING | Age: 31
End: 2021-06-18
Payer: MEDICARE

## 2021-06-18 VITALS
SYSTOLIC BLOOD PRESSURE: 130 MMHG | RESPIRATION RATE: 16 BRPM | TEMPERATURE: 98.5 F | HEART RATE: 90 BPM | DIASTOLIC BLOOD PRESSURE: 76 MMHG | OXYGEN SATURATION: 99 %

## 2021-06-18 VITALS
HEIGHT: 69 IN | DIASTOLIC BLOOD PRESSURE: 70 MMHG | OXYGEN SATURATION: 96 % | SYSTOLIC BLOOD PRESSURE: 111 MMHG | WEIGHT: 210 LBS | TEMPERATURE: 98.5 F | BODY MASS INDEX: 31.1 KG/M2 | RESPIRATION RATE: 14 BRPM | HEART RATE: 74 BPM

## 2021-06-18 DIAGNOSIS — K92.2 GASTROINTESTINAL HEMORRHAGE, UNSPECIFIED GASTROINTESTINAL HEMORRHAGE TYPE: ICD-10-CM

## 2021-06-18 DIAGNOSIS — R10.9 ABDOMINAL PAIN, UNSPECIFIED ABDOMINAL LOCATION: Primary | ICD-10-CM

## 2021-06-18 DIAGNOSIS — Z53.21 ELOPED FROM EMERGENCY DEPARTMENT: Primary | ICD-10-CM

## 2021-06-18 LAB
ALBUMIN SERPL-MCNC: 3.8 G/DL (ref 3.5–5.1)
ALBUMIN SERPL-MCNC: 4.1 G/DL (ref 3.5–5.1)
ALP BLD-CCNC: 53 U/L (ref 38–126)
ALP BLD-CCNC: 60 U/L (ref 38–126)
ALT SERPL-CCNC: 13 U/L (ref 11–66)
ALT SERPL-CCNC: 15 U/L (ref 11–66)
ANION GAP SERPL CALCULATED.3IONS-SCNC: 10 MEQ/L (ref 8–16)
ANION GAP SERPL CALCULATED.3IONS-SCNC: 8 MEQ/L (ref 8–16)
AST SERPL-CCNC: 21 U/L (ref 5–40)
AST SERPL-CCNC: 23 U/L (ref 5–40)
BASOPHILS # BLD: 0.3 %
BASOPHILS # BLD: 0.3 %
BASOPHILS ABSOLUTE: 0 THOU/MM3 (ref 0–0.1)
BASOPHILS ABSOLUTE: 0 THOU/MM3 (ref 0–0.1)
BILIRUB SERPL-MCNC: 0.2 MG/DL (ref 0.3–1.2)
BILIRUB SERPL-MCNC: 0.2 MG/DL (ref 0.3–1.2)
BILIRUBIN URINE: NEGATIVE
BLOOD, URINE: NEGATIVE
BUN BLDV-MCNC: 10 MG/DL (ref 7–22)
BUN BLDV-MCNC: 10 MG/DL (ref 7–22)
C-REACTIVE PROTEIN: 0.79 MG/DL (ref 0–1)
CALCIUM SERPL-MCNC: 8.8 MG/DL (ref 8.5–10.5)
CALCIUM SERPL-MCNC: 9 MG/DL (ref 8.5–10.5)
CHARACTER, URINE: CLEAR
CHLORIDE BLD-SCNC: 100 MEQ/L (ref 98–111)
CHLORIDE BLD-SCNC: 99 MEQ/L (ref 98–111)
CO2: 27 MEQ/L (ref 23–33)
CO2: 30 MEQ/L (ref 23–33)
COLOR: YELLOW
CREAT SERPL-MCNC: 0.6 MG/DL (ref 0.4–1.2)
CREAT SERPL-MCNC: 0.8 MG/DL (ref 0.4–1.2)
EOSINOPHIL # BLD: 1.7 %
EOSINOPHIL # BLD: 1.9 %
EOSINOPHILS ABSOLUTE: 0.2 THOU/MM3 (ref 0–0.4)
EOSINOPHILS ABSOLUTE: 0.2 THOU/MM3 (ref 0–0.4)
ERYTHROCYTE [DISTWIDTH] IN BLOOD BY AUTOMATED COUNT: 12.5 % (ref 11.5–14.5)
ERYTHROCYTE [DISTWIDTH] IN BLOOD BY AUTOMATED COUNT: 12.6 % (ref 11.5–14.5)
ERYTHROCYTE [DISTWIDTH] IN BLOOD BY AUTOMATED COUNT: 41.1 FL (ref 35–45)
ERYTHROCYTE [DISTWIDTH] IN BLOOD BY AUTOMATED COUNT: 41.4 FL (ref 35–45)
GFR SERPL CREATININE-BSD FRML MDRD: > 90 ML/MIN/1.73M2
GFR SERPL CREATININE-BSD FRML MDRD: > 90 ML/MIN/1.73M2
GLUCOSE BLD-MCNC: 101 MG/DL (ref 70–108)
GLUCOSE BLD-MCNC: 105 MG/DL (ref 70–108)
GLUCOSE URINE: NEGATIVE MG/DL
HCT VFR BLD CALC: 41.5 % (ref 42–52)
HCT VFR BLD CALC: 42.6 % (ref 42–52)
HEMOGLOBIN: 13.8 GM/DL (ref 14–18)
HEMOGLOBIN: 13.9 GM/DL (ref 14–18)
IMMATURE GRANS (ABS): 0.02 THOU/MM3 (ref 0–0.07)
IMMATURE GRANS (ABS): 0.02 THOU/MM3 (ref 0–0.07)
IMMATURE GRANULOCYTES: 0.2 %
IMMATURE GRANULOCYTES: 0.2 %
KETONES, URINE: ABNORMAL
LEUKOCYTE ESTERASE, URINE: NEGATIVE
LIPASE: 11.2 U/L (ref 5.6–51.3)
LIPASE: 18.8 U/L (ref 5.6–51.3)
LYMPHOCYTES # BLD: 26.7 %
LYMPHOCYTES # BLD: 29.9 %
LYMPHOCYTES ABSOLUTE: 2.6 THOU/MM3 (ref 1–4.8)
LYMPHOCYTES ABSOLUTE: 3 THOU/MM3 (ref 1–4.8)
MAGNESIUM: 2.2 MG/DL (ref 1.6–2.4)
MCH RBC QN AUTO: 29.7 PG (ref 26–33)
MCH RBC QN AUTO: 29.8 PG (ref 26–33)
MCHC RBC AUTO-ENTMCNC: 32.6 GM/DL (ref 32.2–35.5)
MCHC RBC AUTO-ENTMCNC: 33.3 GM/DL (ref 32.2–35.5)
MCV RBC AUTO: 89.4 FL (ref 80–94)
MCV RBC AUTO: 91.2 FL (ref 80–94)
MONOCYTES # BLD: 6.1 %
MONOCYTES # BLD: 6.7 %
MONOCYTES ABSOLUTE: 0.6 THOU/MM3 (ref 0.4–1.3)
MONOCYTES ABSOLUTE: 0.7 THOU/MM3 (ref 0.4–1.3)
NITRITE, URINE: NEGATIVE
NUCLEATED RED BLOOD CELLS: 0 /100 WBC
NUCLEATED RED BLOOD CELLS: 0 /100 WBC
OSMOLALITY CALCULATION: 271.1 MOSMOL/KG (ref 275–300)
OSMOLALITY CALCULATION: 275.1 MOSMOL/KG (ref 275–300)
PH UA: 6 (ref 5–9)
PLATELET # BLD: 229 THOU/MM3 (ref 130–400)
PLATELET # BLD: 257 THOU/MM3 (ref 130–400)
PMV BLD AUTO: 10.6 FL (ref 9.4–12.4)
PMV BLD AUTO: 10.8 FL (ref 9.4–12.4)
POTASSIUM REFLEX MAGNESIUM: 4 MEQ/L (ref 3.5–5.2)
POTASSIUM SERPL-SCNC: 3.7 MEQ/L (ref 3.5–5.2)
PROTEIN UA: NEGATIVE
RBC # BLD: 4.64 MILL/MM3 (ref 4.7–6.1)
RBC # BLD: 4.67 MILL/MM3 (ref 4.7–6.1)
SEDIMENTATION RATE, ERYTHROCYTE: 10 MM/HR (ref 0–10)
SEG NEUTROPHILS: 61.6 %
SEG NEUTROPHILS: 64.4 %
SEGMENTED NEUTROPHILS ABSOLUTE COUNT: 6.3 THOU/MM3 (ref 1.8–7.7)
SEGMENTED NEUTROPHILS ABSOLUTE COUNT: 6.4 THOU/MM3 (ref 1.8–7.7)
SODIUM BLD-SCNC: 136 MEQ/L (ref 135–145)
SODIUM BLD-SCNC: 138 MEQ/L (ref 135–145)
SPECIFIC GRAVITY, URINE: 1.02 (ref 1–1.03)
TOTAL PROTEIN: 6.6 G/DL (ref 6.1–8)
TOTAL PROTEIN: 6.8 G/DL (ref 6.1–8)
UROBILINOGEN, URINE: 1 EU/DL (ref 0–1)
WBC # BLD: 10.2 THOU/MM3 (ref 4.8–10.8)
WBC # BLD: 9.9 THOU/MM3 (ref 4.8–10.8)

## 2021-06-18 PROCEDURE — 36415 COLL VENOUS BLD VENIPUNCTURE: CPT

## 2021-06-18 PROCEDURE — 85025 COMPLETE CBC W/AUTO DIFF WBC: CPT

## 2021-06-18 PROCEDURE — 83690 ASSAY OF LIPASE: CPT

## 2021-06-18 PROCEDURE — 83735 ASSAY OF MAGNESIUM: CPT

## 2021-06-18 PROCEDURE — 6360000002 HC RX W HCPCS: Performed by: EMERGENCY MEDICINE

## 2021-06-18 PROCEDURE — 96375 TX/PRO/DX INJ NEW DRUG ADDON: CPT

## 2021-06-18 PROCEDURE — C9113 INJ PANTOPRAZOLE SODIUM, VIA: HCPCS | Performed by: EMERGENCY MEDICINE

## 2021-06-18 PROCEDURE — 2580000003 HC RX 258: Performed by: EMERGENCY MEDICINE

## 2021-06-18 PROCEDURE — 99284 EMERGENCY DEPT VISIT MOD MDM: CPT

## 2021-06-18 PROCEDURE — 96374 THER/PROPH/DIAG INJ IV PUSH: CPT

## 2021-06-18 PROCEDURE — 80053 COMPREHEN METABOLIC PANEL: CPT

## 2021-06-18 PROCEDURE — 85651 RBC SED RATE NONAUTOMATED: CPT

## 2021-06-18 PROCEDURE — 81003 URINALYSIS AUTO W/O SCOPE: CPT

## 2021-06-18 PROCEDURE — 86140 C-REACTIVE PROTEIN: CPT

## 2021-06-18 RX ORDER — PROMETHAZINE HYDROCHLORIDE 25 MG/1
25 TABLET ORAL 3 TIMES DAILY PRN
Qty: 12 TABLET | Refills: 0 | Status: SHIPPED | OUTPATIENT
Start: 2021-06-18 | End: 2021-06-25

## 2021-06-18 RX ORDER — SODIUM CHLORIDE 9 MG/ML
10 INJECTION INTRAVENOUS DAILY
Status: DISCONTINUED | OUTPATIENT
Start: 2021-06-18 | End: 2021-06-18 | Stop reason: HOSPADM

## 2021-06-18 RX ORDER — DIPHENHYDRAMINE HYDROCHLORIDE 50 MG/ML
25 INJECTION INTRAMUSCULAR; INTRAVENOUS ONCE
Status: COMPLETED | OUTPATIENT
Start: 2021-06-18 | End: 2021-06-18

## 2021-06-18 RX ORDER — PANTOPRAZOLE SODIUM 40 MG/1
40 TABLET, DELAYED RELEASE ORAL
Qty: 30 TABLET | Refills: 0 | Status: ON HOLD | OUTPATIENT
Start: 2021-06-18 | End: 2021-07-26 | Stop reason: SDUPTHER

## 2021-06-18 RX ORDER — KETOROLAC TROMETHAMINE 30 MG/ML
30 INJECTION, SOLUTION INTRAMUSCULAR; INTRAVENOUS ONCE
Status: COMPLETED | OUTPATIENT
Start: 2021-06-18 | End: 2021-06-18

## 2021-06-18 RX ORDER — ONDANSETRON 4 MG/1
4 TABLET, ORALLY DISINTEGRATING ORAL ONCE
Status: DISCONTINUED | OUTPATIENT
Start: 2021-06-18 | End: 2021-06-18 | Stop reason: HOSPADM

## 2021-06-18 RX ORDER — PANTOPRAZOLE SODIUM 40 MG/10ML
40 INJECTION, POWDER, LYOPHILIZED, FOR SOLUTION INTRAVENOUS ONCE
Status: COMPLETED | OUTPATIENT
Start: 2021-06-18 | End: 2021-06-18

## 2021-06-18 RX ADMIN — SODIUM CHLORIDE 10 ML: 9 INJECTION, SOLUTION INTRAMUSCULAR; INTRAVENOUS; SUBCUTANEOUS at 04:50

## 2021-06-18 RX ADMIN — DIPHENHYDRAMINE HYDROCHLORIDE 25 MG: 50 INJECTION INTRAMUSCULAR; INTRAVENOUS at 04:47

## 2021-06-18 RX ADMIN — PANTOPRAZOLE SODIUM 40 MG: 40 INJECTION, POWDER, FOR SOLUTION INTRAVENOUS at 04:47

## 2021-06-18 RX ADMIN — KETOROLAC TROMETHAMINE 30 MG: 30 INJECTION, SOLUTION INTRAMUSCULAR; INTRAVENOUS at 04:47

## 2021-06-18 ASSESSMENT — ENCOUNTER SYMPTOMS
RHINORRHEA: 0
PHOTOPHOBIA: 0
EYE PAIN: 0
CONSTIPATION: 0
SORE THROAT: 0
VOMITING: 0
CHEST TIGHTNESS: 1
SHORTNESS OF BREATH: 0
SINUS PRESSURE: 0
BACK PAIN: 0
SHORTNESS OF BREATH: 0
ANAL BLEEDING: 0
ABDOMINAL PAIN: 1
SINUS PAIN: 0
CHEST TIGHTNESS: 0
COUGH: 0
VOMITING: 1
NAUSEA: 1
DIARRHEA: 0
BLOOD IN STOOL: 1
ABDOMINAL DISTENTION: 0
EYE ITCHING: 0
STRIDOR: 0
RHINORRHEA: 0
EYE DISCHARGE: 0
EYE REDNESS: 0
WHEEZING: 0
CONSTIPATION: 1
NAUSEA: 1
WHEEZING: 0
COUGH: 1
ABDOMINAL PAIN: 1
SORE THROAT: 1
DIARRHEA: 1

## 2021-06-18 ASSESSMENT — PAIN DESCRIPTION - LOCATION
LOCATION: ABDOMEN
LOCATION: ABDOMEN

## 2021-06-18 ASSESSMENT — PAIN DESCRIPTION - PAIN TYPE
TYPE: ACUTE PAIN
TYPE: ACUTE PAIN

## 2021-06-18 ASSESSMENT — PAIN SCALES - GENERAL
PAINLEVEL_OUTOF10: 9
PAINLEVEL_OUTOF10: 10
PAINLEVEL_OUTOF10: 9

## 2021-06-18 ASSESSMENT — PAIN DESCRIPTION - FREQUENCY: FREQUENCY: CONTINUOUS

## 2021-06-18 ASSESSMENT — PAIN DESCRIPTION - DESCRIPTORS: DESCRIPTORS: SHARP

## 2021-06-18 ASSESSMENT — PAIN DESCRIPTION - ORIENTATION: ORIENTATION: LOWER;MID

## 2021-06-18 NOTE — ED PROVIDER NOTES
[facility] Emergency Department    CHIEF COMPLAINT       Chief Complaint   Patient presents with    Rectal Bleeding    Emesis       Nurses Notes reviewed and I agree except as noted in the HPI. HISTORY OF PRESENT ILLNESS    Santa Colyb anali 27 y.o. male who presents to the ED for evaluation of emesis, headache, and hematochezia. He has been having abdominal pain for the last 5 days. Yesterday he had 2 bowel movements that were black tarry stools. He noted some pain with these bowel movements. Today he woke up with a headache and it has not gone away. He is dizzy and lightheaded. His headache is generalized. He has vomited 3 times today. Denies any hematemesis. He admits palpitations and chest tightness that occurred earlier today. He notes he has been coughing the last couple days. He says it is a productive cough with green phlegm. He has had a sore throat the last couple days. He denies any fever or nasal congestion. He has not been drinking much water because it hurts his stomach. He has ate one meal today. HPI was provided by the patient    REVIEW OF SYSTEMS     Review of Systems   Constitutional: Positive for fatigue. Negative for fever. HENT: Positive for sore throat. Negative for congestion, ear discharge, ear pain, nosebleeds, rhinorrhea, sinus pressure, sinus pain, sneezing and tinnitus. Respiratory: Positive for cough and chest tightness. Negative for shortness of breath and wheezing. Cardiovascular: Positive for palpitations. Negative for chest pain. Gastrointestinal: Positive for abdominal pain, blood in stool, constipation, diarrhea, nausea and vomiting. Negative for anal bleeding. Genitourinary: Positive for dysuria. Negative for hematuria. Musculoskeletal: Positive for myalgias. Skin: Negative for pallor. Neurological: Positive for dizziness, light-headedness and headaches. Psychiatric/Behavioral: Negative. All other systems negative except as noted. PAST MEDICAL HISTORY     Past Medical History:   Diagnosis Date    Anxiety     Arthritis     Asthma     Bipolar I disorder, most recent episode (or current) depressed, unspecified 9/12/2014    Clostridium difficile infection     COPD [facility] obstructive [facility] disease) [facility]     Depression     Disease of blood and blood forming organ     Eczema     Fracture, metacarpal     R 4th and 5th    Gastric ulcer     Gastritis 06/13/2018    GERD (gastroesophageal reflux disease)     GI bleed     H. 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[facility]     Substance abuse ([facility]     Suicidal ideation     Suicide attempt by hanging ([facility]     Tobacco dependence     Ulcerative colitis ([facility]     UTI (urinary tract infection)        SURGICALHISTORY      has a past surgical history that includes Colonoscopy; bronchoscopy; other surgical history; Upper gastrointestinal endoscopy (2/4/16); pr egd transoral biopsy single/multiple (N/A, 3/20/2017); sigmoidoscopy (N/A, 3/20/2017); Cholecystectomy, laparoscopic (07/14/2017); pr esophagogastroduodenoscopy transoral diagnostic (N/A, 8/9/2017); pr colonoscopy w/biopsy single/multiple (8/9/2017); Abdomen surgery; Upper gastrointestinal endoscopy (N/A, 6/13/2018); Upper gastrointestinal endoscopy (N/A, 9/20/2018); and Endoscopy, colon, diagnostic.     CURRENT MEDICATIONS       Discharge Medication List as of 6/18/2021  2:43 AM      CONTINUE these medications which have NOT CHANGED    Details   naloxone 4 MG/0.1ML LIQD nasal spray 1 spray by Nasal route as needed for Opioid Reversal, Disp-1 each, R-1Normal      tiZANidine (ZANAFLEX) 4 MG tablet Take 1 tablet by mouth every 8 hours as needed (HEADACHES, BACK PAIN), Disp-30 tablet, R-0Normal      DULoxetine (CYMBALTA) 30 MG extended release capsule Take 30 mg by mouth dailyHistorical Med      gabapentin (NEURONTIN) 600 MG tablet Take 1 tablet by mouth 3 times daily for 30 days. , Disp-90 tablet, R-0Normal      cloNIDine (CATAPRES) 0.1 MG tablet Take 1 tablet by mouth 3 times daily, Disp-90 tablet, R-1Normal      promethazine (PHENERGAN) 12.5 MG tablet Take 1 tablet by mouth every 6 hours as needed for Nausea, Disp-30 tablet, R-1Normal      ibuprofen (ADVIL;MOTRIN) 600 MG tablet Take 600 mg by mouth every 8 hours as needed for PainHistorical Med      traZODone (DESYREL) 100 MG tablet Take 100 mg by mouth nightlyHistorical Med      Suvorexant (BELSOMRA) 20 MG TABS Take 1 tablet by mouth nightly. Historical Med      QUEtiapine (SEROQUEL) 200 MG tablet Take 200 mg by mouth nightlyHistorical Med      meloxicam (MOBIC) 7.5 MG tablet Take 1 tablet by mouth daily, Disp-30 tablet,R-0Print      acetaminophen (TYLENOL) 500 MG tablet Take 1 tablet by mouth every 4 hours as needed for Pain, Disp-20 tablet,R-0Print      albuterol sulfate HFA (VENTOLIN HFA) 108 (90 Base) MCG/ACT inhaler Inhale 2 puffs into the lungs every 6 hours as needed for Wheezing, Disp-18 g, R-0Print      melatonin ER 1 MG TBCR tablet Take 1 tablet by mouth nightly as needed (Sleep), Disp-14 tablet, R-0Normal      Fluticasone Furoate-Vilanterol (BREO ELLIPTA) 200-25 MCG/INH AEPB Inhale 1 puff into the lungs daily, Disp-1 each, R-0Normal      vitamin D (ERGOCALCIFEROL) 29307 units capsule Take 1 capsule by mouth once a week, Disp-30 capsule, R-0Normal             ALLERGIES     is allergic to dicyclomine, famotidine, geodon [ziprasidone hcl], haloperidol, iv dye [iodides], ibuprofen, aspirin, dicyclomine hcl, flagyl [metronidazole], iodine, and mirtazapine. FAMILY HISTORY     He indicated that his mother is alive. He indicated that his father is alive. He indicated that the status of his sister is unknown.  He and Family:     Frequency of Social Gatherings with Friends and Family:     Attends Zoroastrianism Services:     Active Member of Clubs or Organizations:     Attends Club or Organization Meetings:     Marital Status:    Intimate Partner Violence:     Fear of Current or Ex-Partner:     Emotionally Abused:     Physically Abused:     Sexually Abused:        PHYSICAL EXAM     INITIAL VITALS:  oral temperature is 98.5 °F (36.9 °C). His blood pressure is 130/76 and his pulse is 90. His respiration is 16 and oxygen saturation is 99%. Physical Exam  Constitutional:       General: He is not in acute distress. Appearance: Normal appearance. HENT:      Head: Atraumatic. Mouth/Throat:      Mouth: Mucous membranes are moist.      Pharynx: Oropharynx is clear. No oropharyngeal exudate or posterior oropharyngeal erythema. Cardiovascular:      Rate and Rhythm: Normal rate and regular rhythm. Heart sounds: Normal heart sounds. Pulmonary:      Effort: Pulmonary effort is normal. No respiratory distress. Breath sounds: Examination of the right-upper field reveals rhonchi. Rhonchi present. Abdominal:      General: Abdomen is flat. There is no distension. Palpations: There is no mass. Tenderness: There is abdominal tenderness. There is right CVA tenderness, left CVA tenderness and guarding. Skin:     General: Skin is warm and dry. Capillary Refill: Capillary refill takes less than 2 seconds. Coloration: Skin is not pale. Neurological:      Mental Status: He is alert and oriented to person, place, and time.    Psychiatric:         Mood and Affect: Mood normal.         Behavior: Behavior normal.         DIFFERENTIAL DIAGNOSIS:   Uti, kidney stone, diverticulitis, pancreatitis    DIAGNOSTIC RESULTS     EKG: All EKG's are interpreted by the Emergency Department Physician who eithersigns or Co-signs this chart in the absence of a cardiologist.        RADIOLOGY: non-plainfilm images(s) such as CT, Ultrasound and MRI are read by the radiologist.  Plain radiographic images are visualized and preliminarily interpreted by the emergency physician unless otherwise stated below. No orders to display         LABS:   Labs Reviewed   CBC WITH AUTO DIFFERENTIAL - Abnormal; Notable for the following components:       Result Value    RBC 4.67 (*)     Hemoglobin 13.9 (*)     All other components within normal limits   COMPREHENSIVE METABOLIC PANEL W/ REFLEX TO MG FOR LOW K - Abnormal; Notable for the following components: Total Bilirubin 0.2 (*)     All other components within normal limits   OSMOLALITY - Abnormal; Notable for the following components:    Osmolality Calc 271.1 (*)     All other components within normal limits   LIPASE   ANION GAP   GLOMERULAR FILTRATION RATE, ESTIMATED       EMERGENCY DEPARTMENT COURSE:   Vitals:    Vitals:    06/18/21 0047 06/18/21 0207   BP: 134/75 130/76   Pulse: 77 90   Resp: 16 16   Temp: 98.5 °F (36.9 °C)    TempSrc: Oral    SpO2: 96% 99%       MDM                         MDM    Patient was seen evaluate the emergency room for abdominal pain and multiple other complaints. Appropriate labs and imaging are ordered and reviewed. Patient then eloped from the emergency room. Later told that the patient is home alone because his roommate is on vacation and he became very agitated and he thought that if he came in and complained of abdominal pain he would be admitted while his roommate is gone. Patient left in stable condition with no distress. Medications - No data to display      Patient was seen independently by myself. The patient's final impression and disposition and plan was determined by myself. Strict return precautions and follow up instructions were discussed with the patient prior to discharge, with which the patient agrees.     Physical assessment findings, diagnostic testing(s) if applicable, and vital signs reviewed with patient/patient

## 2021-06-18 NOTE — ED PROVIDER NOTES
[address] ENCOUNTER      PATIENT NAME: Mac Farias  MRN: [id number]  : 1990  REYNOLDS: 2021  PROVIDER: Mellisa Austin MD      CHIEF COMPLAINT       Chief Complaint   Patient presents with    Emesis    Dizziness       Patient is seen and evaluated in a timely fashion. Nurses Notes are reviewed and I agree except as noted in the HPI. HISTORY OF PRESENT ILLNESS    Mac Farias is a 27 y.o. male who presents to Emergency Department with Emesis and Dizziness    Patient has been having periumbilical abdominal pain over the last 5 days duration. He says he has multiple episodes of bloody stool today. He thinks he may be dehydrated. Patient also complains diffuse pruritus but no skin rashes. Patient seems anxious. No fever and no chills. Patient states he has been having intermittent abdominal pain since his cholecystectomy . He was concerned his pain may be gallbladder surgery related. He has mild nausea, no hematemesis. No urinary symptoms. This HPI was provided by patient. REVIEW OF SYSTEMS   Review of Systems   Constitutional: Negative for activity change, appetite change, chills, fatigue, fever and unexpected weight change. HENT: Negative for congestion, ear discharge, ear pain, hearing loss, nosebleeds, rhinorrhea and sore throat. Eyes: Negative for photophobia, pain, discharge, redness and itching. Respiratory: Negative for cough, chest tightness, shortness of breath, wheezing and stridor. Cardiovascular: Negative for chest pain, palpitations and leg swelling. Gastrointestinal: Positive for abdominal pain and nausea. Negative for abdominal distention, constipation, diarrhea and vomiting. Endocrine: Negative for cold intolerance, heat intolerance, polydipsia and polyphagia. Genitourinary: Negative for dysuria, flank pain, frequency and hematuria.    Musculoskeletal: Negative for arthralgias, back pain, gait problem, performed by Maylon Goldberg, MD at [facility]    OR ESOPHAGOGASTRODUODENOSCOPY TRANSORAL DIAGNOSTIC N/A 8/9/2017    EGD ESOPHAGOGASTRODUODENOSCOPY performed by Johanna Rutherford MD at [address] [facility] N/A 3/20/2017    [address] [facility] performed by Maylon Goldberg, MD at [facility]  2/4/16    UPPER GASTROINTESTINAL ENDOSCOPY N/A 6/13/2018    GASTRITIS    UPPER GASTROINTESTINAL ENDOSCOPY N/A 9/20/2018    EGD BIOPSY performed by Gertrude Quiros MD at [facility]       Previous Medications    ACETAMINOPHEN (TYLENOL) 500 MG TABLET    Take 1 tablet by mouth every 4 hours as needed for Pain    ALBUTEROL SULFATE HFA (VENTOLIN HFA) 108 (90 BASE) MCG/ACT INHALER    Inhale 2 puffs into the lungs every 6 hours as needed for Wheezing    CLONIDINE (CATAPRES) 0.1 MG TABLET    Take 1 tablet by mouth 3 times daily    DULOXETINE (CYMBALTA) 30 MG EXTENDED RELEASE CAPSULE    Take 30 mg by mouth daily    FLUTICASONE FUROATE-VILANTEROL (BREO ELLIPTA) 200-25 MCG/INH AEPB    Inhale 1 puff into the lungs daily    GABAPENTIN (NEURONTIN) 600 MG TABLET    Take 1 tablet by mouth 3 times daily for 30 days. IBUPROFEN (ADVIL;MOTRIN) 600 MG TABLET    Take 600 mg by mouth every 8 hours as needed for Pain    MELATONIN ER 1 MG TBCR TABLET    Take 1 tablet by mouth nightly as needed (Sleep)    MELOXICAM (MOBIC) 7.5 MG TABLET    Take 1 tablet by mouth daily    NALOXONE 4 MG/0.1ML LIQD NASAL SPRAY    1 spray by Nasal route as needed for Opioid Reversal    QUETIAPINE (SEROQUEL) 200 MG TABLET    Take 200 mg by mouth nightly    SUVOREXANT (BELSOMRA) 20 MG TABS    Take 1 tablet by mouth nightly.     TIZANIDINE (ZANAFLEX) 4 MG TABLET    Take 1 tablet by mouth every 8 hours as needed (HEADACHES, BACK PAIN)    TRAZODONE (DESYREL) 100 MG TABLET    Take 100 mg by mouth nightly    VITAMIN D (ERGOCALCIFEROL) 64450 UNITS CAPSULE    Take 1 capsule by mouth once a week       ALLERGIES     Dicyclomine, Famotidine, Geodon [ziprasidone hcl], Haloperidol, Iv dye [iodides], Ibuprofen, Aspirin, Dicyclomine hcl, Flagyl [metronidazole], Iodine, and Mirtazapine    FAMILY HISTORY     He indicated that his mother is alive. He indicated that his father is alive. He indicated that the status of his sister is unknown. He indicated that the status of his other is unknown. He indicated that the status of his paternal cousin is unknown.   family history includes Alcohol Abuse in his father; Arthritis in his father and mother; Colon Cancer (age of onset: 43) in his paternal cousin; Depression in his brother and father; Diabetes in his brother and father; High Blood Pressure in his father; Mental Illness in his brother; Migraines in his brother, father, mother, and sister; Other in his brother, brother, father, mother, and sister; Stroke in an other family member. SOCIAL HISTORY      reports that he has been smoking cigarettes. He has a 5.00 pack-year smoking history. He has never used smokeless tobacco. He reports previous alcohol use. He reports that he does not use drugs. PHYSICAL EXAM      height is 5' 9\" (1.753 m) and weight is 210 lb (95.3 kg). His oral temperature is 98.5 °F (36.9 °C). His blood pressure is 111/70 and his pulse is 74. His respiration is 14 and oxygen saturation is 96%. Physical Exam  Vitals and nursing note reviewed. Constitutional:       Appearance: He is well-developed. He is not diaphoretic. HENT:      Head: Normocephalic and atraumatic. Nose: Nose normal.   Eyes:      General: No scleral icterus. Right eye: No discharge. Left eye: No discharge. Conjunctiva/sclera: Conjunctivae normal.      Pupils: Pupils are equal, round, and reactive to light. Neck:      Vascular: No JVD. Trachea: No tracheal deviation. Cardiovascular:      Rate and Rhythm: Normal rate and regular rhythm.       Heart sounds: Normal heart sounds. No murmur heard. No friction rub. No gallop. Pulmonary:      Effort: Pulmonary effort is normal. No respiratory distress. Breath sounds: Normal breath sounds. No stridor. No wheezing or rales. Chest:      Chest wall: No tenderness. Abdominal:      General: Bowel sounds are normal. There is no distension. Palpations: Abdomen is soft. There is no mass. Tenderness: There is abdominal tenderness. There is no guarding or rebound. Hernia: No hernia is present. Comments: Diffuse abdominal tenderness, no guarding, no rebound. Normal active bowel sounds. Musculoskeletal:         General: No tenderness or deformity. Cervical back: Normal range of motion and neck supple. Lymphadenopathy:      Cervical: No cervical adenopathy. Skin:     General: Skin is warm and dry. Capillary Refill: Capillary refill takes less than 2 seconds. Coloration: Skin is not pale. Findings: No erythema or rash. Neurological:      Mental Status: He is alert and oriented to person, place, and time. Cranial Nerves: No cranial nerve deficit. Sensory: No sensory deficit. Motor: No abnormal muscle tone. Coordination: Coordination normal.      Deep Tendon Reflexes: Reflexes normal.   Psychiatric:         Behavior: Behavior normal.         Thought Content: Thought content normal.         Judgment: Judgment normal.         ANCILLARY TEST RESULTS   EKG:    Interpreted by me  Not indicated    LAB RESULTS:  Results for orders placed or performed during the hospital encounter of 06/18/21   CBC Auto Differential   Result Value Ref Range    WBC 10.2 4.8 - 10.8 thou/mm3    RBC 4.64 (L) 4.70 - 6.10 mill/mm3    Hemoglobin 13.8 (L) 14.0 - 18.0 gm/dl    Hematocrit 41.5 (L) 42.0 - 52.0 %    MCV 89.4 80.0 - 94.0 fL    MCH 29.7 26.0 - 33.0 pg    MCHC 33.3 32.2 - 35.5 gm/dl    RDW-CV 12.6 11.5 - 14.5 %    RDW-SD 41.1 35.0 - 45.0 fL    Platelets 678 089 - 114 thou/mm3    MPV 10.8 9.4 - 12.4 fL    Seg Neutrophils 61.6 %    Lymphocytes 29.9 %    Monocytes 6.1 %    Eosinophils 1.9 %    Basophils 0.3 %    Immature Granulocytes 0.2 %    Segs Absolute 6.3 1 - 7 thou/mm3    Lymphocytes Absolute 3.0 1.0 - 4.8 thou/mm3    Monocytes Absolute 0.6 0.4 - 1.3 thou/mm3    Eosinophils Absolute 0.2 0.0 - 0.4 thou/mm3    Basophils Absolute 0.0 0.0 - 0.1 thou/mm3    Immature Grans (Abs) 0.02 0.00 - 0.07 thou/mm3    nRBC 0 /100 wbc   Comprehensive Metabolic Panel   Result Value Ref Range    Glucose 105 70 - 108 mg/dL    CREATININE 0.6 0.4 - 1.2 mg/dL    BUN 10 7 - 22 mg/dL    Sodium 138 135 - 145 meq/L    Potassium 3.7 3.5 - 5.2 meq/L    Chloride 100 98 - 111 meq/L    CO2 30 23 - 33 meq/L    Calcium 9.0 8.5 - 10.5 mg/dL    AST 23 5 - 40 U/L    Alkaline Phosphatase 60 38 - 126 U/L    Total Protein 6.8 6.1 - 8.0 g/dL    Albumin 4.1 3.5 - 5.1 g/dL    Total Bilirubin 0.2 (L) 0.3 - 1.2 mg/dL    ALT 15 11 - 66 U/L   Magnesium   Result Value Ref Range    Magnesium 2.2 1.6 - 2.4 mg/dL   Lipase   Result Value Ref Range    Lipase 18.8 5.6 - 51.3 U/L   Urine reflex to culture    Specimen: Urine, clean catch   Result Value Ref Range    Glucose, Ur NEGATIVE NEGATIVE mg/dl    Bilirubin Urine NEGATIVE NEGATIVE    Ketones, Urine TRACE (A) NEGATIVE    Specific Gravity, Urine 1.020 1.002 - 1.030    Blood, Urine NEGATIVE NEGATIVE    pH, UA 6.0 5.0 - 9.0    Protein, UA NEGATIVE NEGATIVE    Urobilinogen, Urine 1.0 0.0 - 1.0 eu/dl    Nitrite, Urine NEGATIVE NEGATIVE    Leukocyte Esterase, Urine NEGATIVE NEGATIVE    Color, UA YELLOW STRAW-YELLOW    Character, Urine CLEAR CLEAR-SL CLOUD   C-reactive protein   Result Value Ref Range    CRP 0.79 0.00 - 1.00 mg/dl   Anion Gap   Result Value Ref Range    Anion Gap 8.0 8.0 - 16.0 meq/L   Osmolality   Result Value Ref Range    Osmolality Calc 275.1 275.0 - 300.0 mOsmol/kg   Glomerular Filtration Rate, Estimated   Result Value Ref Range    Est, Glom Filt Rate >90 ml/min/1.73m2 RADIOLOGY REPORTS  No orders to display       MEDICAL DEDISION MAKINGS AND RATIONALES     Differential diagnsis: IBS, IBD, anxiety, peptic ulcer, GERD, pancreatitis, gastritis,    Actions: Large-bore IV, labs, IV Benadryl, Toradol, Protonix  Labs Reviewed   COMPREHENSIVE METABOLIC PANEL   MAGNESIUM   LIPASE   URINE RT REFLEX TO CULTURE   C-REACTIVE PROTEIN   SEDIMENTATION RATE     Medications   diphenhydrAMINE (BENADRYL) injection 25 mg (has no administration in time range)   ketorolac (TORADOL) injection 30 mg (has no administration in time range)   pantoprazole (PROTONIX) injection 40 mg (has no administration in time range)     And   sodium chloride (PF) 0.9 % injection 10 mL (has no administration in time range)         ED Vitals:  Vitals:    06/18/21 0413 06/18/21 0450 06/18/21 0515   BP: 103/65 105/73 111/70   Pulse: 72 77 74   Resp: 18 16 14   Temp: 98.5 °F (36.9 °C)     TempSrc: Oral     SpO2: 99% 99% 96%   Weight: 210 lb (95.3 kg)     Height: 5' 9\" (1.753 m)         Labs are reassuring. Normal CRP. Patient has benign abdominal exam, no guarding, no rebound. I do not feel he needs immediate CT abdomen pelvis. I suggested seeing gastroenterology specialist to consider EGD and colonoscopy. Patient discharged in stable conditions with Phenergan and Protonix prescribed. He has anaphylaxis to Bentyl and H2 blocker. CRITICAL CARE   None    CONSULTS   None    PROCEDURES   None    FINAL IMPRESSION AND DISPOSITION      1. Abdominal pain, unspecified abdominal location    2.  Gastrointestinal hemorrhage, unspecified gastrointestinal hemorrhage type        Home    PATIENT REFERRED TO:  Jocelyn Celis MD  [address] [place] [address]  [phone]    In 1 week  ED discharge follow up      DISCHARGE MEDICATIONS:  New Prescriptions    PANTOPRAZOLE (PROTONIX) 40 MG TABLET    Take 1 tablet by mouth every morning (before breakfast)    PROMETHAZINE (PHENERGAN) 25 MG TABLET    Take 1 tablet by mouth 3 times

## 2021-06-18 NOTE — ED NOTES
Pt ambulated to the restroom to obtain urine sample.      Annelise Cerna RN  06/18/21 [id number] 30-Nov-2017

## 2021-06-18 NOTE — ED NOTES
Pt refused covid swab at this time. Pt eating handful of crackers out of pocket. Provider notified.       Mauro Mcpherson RN  06/18/21 0396

## 2021-06-18 NOTE — ED NOTES
Pt arrives with complaints of bloody stools and emesis. Pt reports he thinks he may be dehydrated. pt reports taking medications as prescribed but reports \"what he can get down\". Placed into gown. Will monitor.       Pat Jj RN  06/18/21 6830

## 2021-06-18 NOTE — ED NOTES
Pt putting clothes on reports. He \"will figure it out\". Seen leaving ER by staff. Will monitor.  Provider made aware      Ant Pruitt RN  06/18/21 4921

## 2021-06-21 ENCOUNTER — APPOINTMENT (OUTPATIENT)
Dept: GENERAL RADIOLOGY | Age: 31
End: 2021-06-21
Payer: MEDICARE

## 2021-06-21 ENCOUNTER — HOSPITAL ENCOUNTER (EMERGENCY)
Age: 31
Discharge: HOME | End: 2021-06-21
Payer: MEDICARE

## 2021-06-21 VITALS
HEIGHT: 69 IN | RESPIRATION RATE: 18 BRPM | SYSTOLIC BLOOD PRESSURE: 133 MMHG | BODY MASS INDEX: 29.62 KG/M2 | OXYGEN SATURATION: 95 % | TEMPERATURE: 98 F | DIASTOLIC BLOOD PRESSURE: 86 MMHG | HEART RATE: 111 BPM | WEIGHT: 200 LBS

## 2021-06-21 DIAGNOSIS — Z53.21 ELOPED FROM EMERGENCY DEPARTMENT: ICD-10-CM

## 2021-06-21 DIAGNOSIS — M25.512 ACUTE PAIN OF LEFT SHOULDER: ICD-10-CM

## 2021-06-21 DIAGNOSIS — K59.00 CONSTIPATION, UNSPECIFIED CONSTIPATION TYPE: ICD-10-CM

## 2021-06-21 DIAGNOSIS — R11.2 NON-INTRACTABLE VOMITING WITH NAUSEA, UNSPECIFIED VOMITING TYPE: ICD-10-CM

## 2021-06-21 DIAGNOSIS — R10.13 ABDOMINAL PAIN, EPIGASTRIC: Primary | ICD-10-CM

## 2021-06-21 LAB
ALBUMIN SERPL-MCNC: 4.6 G/DL (ref 3.5–5.1)
ALP BLD-CCNC: 79 U/L (ref 38–126)
ALT SERPL-CCNC: 17 U/L (ref 11–66)
ANION GAP SERPL CALCULATED.3IONS-SCNC: 15 MEQ/L (ref 8–16)
AST SERPL-CCNC: 19 U/L (ref 5–40)
BASOPHILS # BLD: 0.5 %
BASOPHILS ABSOLUTE: 0 THOU/MM3 (ref 0–0.1)
BILIRUB SERPL-MCNC: 0.4 MG/DL (ref 0.3–1.2)
BILIRUBIN DIRECT: < 0.2 MG/DL (ref 0–0.3)
BUN BLDV-MCNC: 11 MG/DL (ref 7–22)
CALCIUM SERPL-MCNC: 9.5 MG/DL (ref 8.5–10.5)
CHLORIDE BLD-SCNC: 98 MEQ/L (ref 98–111)
CO2: 23 MEQ/L (ref 23–33)
CREAT SERPL-MCNC: 1 MG/DL (ref 0.4–1.2)
EOSINOPHIL # BLD: 1.1 %
EOSINOPHILS ABSOLUTE: 0.1 THOU/MM3 (ref 0–0.4)
ERYTHROCYTE [DISTWIDTH] IN BLOOD BY AUTOMATED COUNT: 12.9 % (ref 11.5–14.5)
ERYTHROCYTE [DISTWIDTH] IN BLOOD BY AUTOMATED COUNT: 41 FL (ref 35–45)
GFR SERPL CREATININE-BSD FRML MDRD: > 90 ML/MIN/1.73M2
GLUCOSE BLD-MCNC: 164 MG/DL (ref 70–108)
HCT VFR BLD CALC: 47.1 % (ref 42–52)
HEMOGLOBIN: 15.8 GM/DL (ref 14–18)
IMMATURE GRANS (ABS): 0.02 THOU/MM3 (ref 0–0.07)
IMMATURE GRANULOCYTES: 0.3 %
LACTIC ACID: 2.6 MMOL/L (ref 0.5–2)
LIPASE: 21.9 U/L (ref 5.6–51.3)
LYMPHOCYTES # BLD: 36.6 %
LYMPHOCYTES ABSOLUTE: 2.7 THOU/MM3 (ref 1–4.8)
MCH RBC QN AUTO: 29.5 PG (ref 26–33)
MCHC RBC AUTO-ENTMCNC: 33.5 GM/DL (ref 32.2–35.5)
MCV RBC AUTO: 87.9 FL (ref 80–94)
MONOCYTES # BLD: 6.4 %
MONOCYTES ABSOLUTE: 0.5 THOU/MM3 (ref 0.4–1.3)
NUCLEATED RED BLOOD CELLS: 0 /100 WBC
OSMOLALITY CALCULATION: 275 MOSMOL/KG (ref 275–300)
PLATELET # BLD: 303 THOU/MM3 (ref 130–400)
PMV BLD AUTO: 10 FL (ref 9.4–12.4)
POTASSIUM SERPL-SCNC: 3.7 MEQ/L (ref 3.5–5.2)
PROCALCITONIN: 0.06 NG/ML (ref 0.01–0.09)
RBC # BLD: 5.36 MILL/MM3 (ref 4.7–6.1)
SEG NEUTROPHILS: 55.1 %
SEGMENTED NEUTROPHILS ABSOLUTE COUNT: 4.1 THOU/MM3 (ref 1.8–7.7)
SODIUM BLD-SCNC: 136 MEQ/L (ref 135–145)
TOTAL PROTEIN: 8.2 G/DL (ref 6.1–8)
WBC # BLD: 7.5 THOU/MM3 (ref 4.8–10.8)

## 2021-06-21 PROCEDURE — 2580000003 HC RX 258: Performed by: PHYSICIAN ASSISTANT

## 2021-06-21 PROCEDURE — 80053 COMPREHEN METABOLIC PANEL: CPT

## 2021-06-21 PROCEDURE — 99285 EMERGENCY DEPT VISIT HI MDM: CPT

## 2021-06-21 PROCEDURE — 6370000000 HC RX 637 (ALT 250 FOR IP): Performed by: PHYSICIAN ASSISTANT

## 2021-06-21 PROCEDURE — 82248 BILIRUBIN DIRECT: CPT

## 2021-06-21 PROCEDURE — 83690 ASSAY OF LIPASE: CPT

## 2021-06-21 PROCEDURE — 74018 RADEX ABDOMEN 1 VIEW: CPT

## 2021-06-21 PROCEDURE — 83605 ASSAY OF LACTIC ACID: CPT

## 2021-06-21 PROCEDURE — 84145 PROCALCITONIN (PCT): CPT

## 2021-06-21 PROCEDURE — 73030 X-RAY EXAM OF SHOULDER: CPT

## 2021-06-21 PROCEDURE — 36415 COLL VENOUS BLD VENIPUNCTURE: CPT

## 2021-06-21 PROCEDURE — 85025 COMPLETE CBC W/AUTO DIFF WBC: CPT

## 2021-06-21 RX ORDER — HYDROXYZINE PAMOATE 25 MG/1
50 CAPSULE ORAL ONCE
Status: DISCONTINUED | OUTPATIENT
Start: 2021-06-21 | End: 2021-06-21 | Stop reason: HOSPADM

## 2021-06-21 RX ORDER — 0.9 % SODIUM CHLORIDE 0.9 %
1000 INTRAVENOUS SOLUTION INTRAVENOUS ONCE
Status: COMPLETED | OUTPATIENT
Start: 2021-06-21 | End: 2021-06-21

## 2021-06-21 RX ORDER — METOCLOPRAMIDE HYDROCHLORIDE 5 MG/ML
10 INJECTION INTRAMUSCULAR; INTRAVENOUS ONCE
Status: DISCONTINUED | OUTPATIENT
Start: 2021-06-21 | End: 2021-06-21

## 2021-06-21 RX ORDER — PROMETHAZINE HYDROCHLORIDE 25 MG/1
25 TABLET ORAL ONCE
Status: COMPLETED | OUTPATIENT
Start: 2021-06-21 | End: 2021-06-21

## 2021-06-21 RX ADMIN — LIDOCAINE HYDROCHLORIDE: 20 SOLUTION ORAL; TOPICAL at 20:22

## 2021-06-21 RX ADMIN — PROMETHAZINE HYDROCHLORIDE 25 MG: 25 TABLET ORAL at 20:22

## 2021-06-21 RX ADMIN — SODIUM CHLORIDE 1000 ML: 9 INJECTION, SOLUTION INTRAVENOUS at 20:22

## 2021-06-21 ASSESSMENT — PAIN SCALES - GENERAL
PAINLEVEL_OUTOF10: 8

## 2021-06-21 ASSESSMENT — ENCOUNTER SYMPTOMS
ABDOMINAL PAIN: 1
SORE THROAT: 0
DIARRHEA: 1
CONSTIPATION: 1
SHORTNESS OF BREATH: 0
COUGH: 0
RHINORRHEA: 0
NAUSEA: 1
BACK PAIN: 1
VOMITING: 1
PHOTOPHOBIA: 0

## 2021-06-21 ASSESSMENT — PAIN DESCRIPTION - LOCATION
LOCATION: ABDOMEN

## 2021-06-21 ASSESSMENT — PAIN DESCRIPTION - PAIN TYPE
TYPE: ACUTE PAIN

## 2021-06-21 ASSESSMENT — PAIN DESCRIPTION - PROGRESSION: CLINICAL_PROGRESSION: NOT CHANGED

## 2021-06-21 NOTE — ED NOTES
Pt to er. Pt c/o abd pain and emesis and thinks he is dehydrated. States thinks he needs some fluids.       Lukasz Mccord RN  06/21/21 4824

## 2021-06-22 ENCOUNTER — HOSPITAL ENCOUNTER (EMERGENCY)
Age: 31
Discharge: LEFT BEFORE DISCHARGE | End: 2021-06-22
Attending: EMERGENCY MEDICINE
Payer: MEDICARE

## 2021-06-22 VITALS
RESPIRATION RATE: 16 BRPM | SYSTOLIC BLOOD PRESSURE: 154 MMHG | TEMPERATURE: 98.2 F | OXYGEN SATURATION: 96 % | HEART RATE: 97 BPM | DIASTOLIC BLOOD PRESSURE: 98 MMHG

## 2021-06-22 DIAGNOSIS — R11.0 NAUSEA: Primary | ICD-10-CM

## 2021-06-22 PROCEDURE — 99282 EMERGENCY DEPT VISIT SF MDM: CPT

## 2021-06-22 PROCEDURE — 6370000000 HC RX 637 (ALT 250 FOR IP): Performed by: EMERGENCY MEDICINE

## 2021-06-22 RX ORDER — ONDANSETRON 4 MG/1
4 TABLET, FILM COATED ORAL ONCE
Status: DISCONTINUED | OUTPATIENT
Start: 2021-06-22 | End: 2021-06-22 | Stop reason: HOSPADM

## 2021-06-22 ASSESSMENT — ENCOUNTER SYMPTOMS
NAUSEA: 1
ABDOMINAL PAIN: 0
BACK PAIN: 0
RECTAL PAIN: 0

## 2021-06-22 NOTE — ED NOTES
Pt resting in bed. Respirations even and unlabored. Pt is requesting medication for itching. Pt medicated per MAR.       Chase Persaud RN  06/21/21 2026

## 2021-06-22 NOTE — ED NOTES
Pt arrives to ED c/o nausea and body aches. Pt was seen in ED for this yesterday. Pt states he feels the same and his GI appointment is too far away. Pt shows no signs of distress. Pt not vomiting at this time.       Luis MONCADA RN  06/22/21 2838

## 2021-06-22 NOTE — ED PROVIDER NOTES
[facility] EMERGENCY DEPT      CHIEF COMPLAINT       Chief Complaint   Patient presents with    Abdominal Pain    Emesis       Nurses Notes reviewed and I agree except as noted in the HPI. HISTORY OF PRESENT ILLNESS    Harper Pride is a 27 y.o. male who presents for abdominal pain. Patient endorses epigastric and periumbilical intermittent pain for the past 1 week. At last a few minutes it is difficult for him to describe. It occurred after he \"ate some stuff out of the refrigerator. \"  Patient has a long history of abdominal pain and has presented to the ER on 6-13 and 6-18 for abdominal pain. Associated symptoms include nausea, vomiting, skin itching, mild low back pain, and diarrhea alternating with constipation. Patient also complains of left shoulder pain which has been bothering him for the last 2 weeks after he fell running from a dog. He had presented to another ER for evaluation but ended up leaving AMA due to the weight. Patient has a history of anxiety, depression, arthritis, opioid dependence, schizophrenia, and cholecystectomy. Patient admits to smoking but denies alcohol or illicit drug use currently. Location/Symptom: Epigastric and periumbilical abdominal pain  Timing/Onset: 1 week ago  Context/Setting: Occurred after \"eating some stuff out of the fridge\". It got better however after eating other food it seemed to worsen. Quality: Sharp and crampy  Duration: Intermittent lasting a few minutes  Modifying Factors: Worse with food improved by nothing  Severity: Mild to moderate    REVIEW OF SYSTEMS     Review of Systems   Constitutional: Negative for appetite change, chills and fever. HENT: Negative for congestion, ear pain, rhinorrhea and sore throat. Eyes: Negative for photophobia. Respiratory: Negative for cough and shortness of breath. Cardiovascular: Negative for chest pain. Gastrointestinal: Positive for abdominal pain, constipation, diarrhea, nausea and vomiting. ALBUTEROL SULFATE HFA (VENTOLIN HFA) 108 (90 BASE) MCG/ACT INHALER    Inhale 2 puffs into the lungs every 6 hours as needed for Wheezing    CLONIDINE (CATAPRES) 0.1 MG TABLET    Take 1 tablet by mouth 3 times daily    DULOXETINE (CYMBALTA) 30 MG EXTENDED RELEASE CAPSULE    Take 30 mg by mouth daily    FLUTICASONE FUROATE-VILANTEROL (BREO ELLIPTA) 200-25 MCG/INH AEPB    Inhale 1 puff into the lungs daily    GABAPENTIN (NEURONTIN) 600 MG TABLET    Take 1 tablet by mouth 3 times daily for 30 days. IBUPROFEN (ADVIL;MOTRIN) 600 MG TABLET    Take 600 mg by mouth every 8 hours as needed for Pain    MELATONIN ER 1 MG TBCR TABLET    Take 1 tablet by mouth nightly as needed (Sleep)    MELOXICAM (MOBIC) 7.5 MG TABLET    Take 1 tablet by mouth daily    NALOXONE 4 MG/0.1ML LIQD NASAL SPRAY    1 spray by Nasal route as needed for Opioid Reversal    PANTOPRAZOLE (PROTONIX) 40 MG TABLET    Take 1 tablet by mouth every morning (before breakfast)    PROMETHAZINE (PHENERGAN) 25 MG TABLET    Take 1 tablet by mouth 3 times daily as needed for Nausea    QUETIAPINE (SEROQUEL) 200 MG TABLET    Take 200 mg by mouth nightly    SUVOREXANT (BELSOMRA) 20 MG TABS    Take 1 tablet by mouth nightly. TIZANIDINE (ZANAFLEX) 4 MG TABLET    Take 1 tablet by mouth every 8 hours as needed (HEADACHES, BACK PAIN)    TRAZODONE (DESYREL) 100 MG TABLET    Take 100 mg by mouth nightly    VITAMIN D (ERGOCALCIFEROL) 21043 UNITS CAPSULE    Take 1 capsule by mouth once a week       ALLERGIES     is allergic to dicyclomine, famotidine, geodon [ziprasidone hcl], haloperidol, iv dye [iodides], ibuprofen, aspirin, dicyclomine hcl, flagyl [metronidazole], iodine, and mirtazapine. FAMILY HISTORY     He indicated that his mother is alive. He indicated that his father is alive. He indicated that the status of his sister is unknown. He indicated that the status of his other is unknown.  He indicated that the status of his paternal cousin is unknown.   family history includes Alcohol Abuse in his father; Arthritis in his father and mother; Colon Cancer (age of onset: 43) in his paternal cousin; Depression in his brother and father; Diabetes in his brother and father; High Blood Pressure in his father; Mental Illness in his brother; Migraines in his brother, father, mother, and sister; Other in his brother, brother, father, mother, and sister; Stroke in an other family member. SOCIAL HISTORY    reports that he has been smoking cigarettes. He has a 5.00 pack-year smoking history. He has never used smokeless tobacco. He reports previous alcohol use. He reports that he does not use drugs. PHYSICAL EXAM     INITIAL VITALS:  height is 5' 9\" (1.753 m) and weight is 200 lb (90.7 kg). His oral temperature is 98 °F (36.7 °C). His blood pressure is 133/86 and his pulse is 111. His respiration is 18 and oxygen saturation is 95%. Physical Exam  Vitals and nursing note reviewed. Constitutional:       General: He is not in acute distress. Appearance: Normal appearance. He is well-developed. He is not toxic-appearing or diaphoretic. HENT:      Head: Normocephalic and atraumatic. Right Ear: Hearing normal.      Left Ear: Hearing normal.      Nose: Nose normal. No rhinorrhea. Mouth/Throat:      Lips: Pink. Mouth: Mucous membranes are moist.      Pharynx: Uvula midline. Eyes:      General: Lids are normal. No scleral icterus. Extraocular Movements: Extraocular movements intact. Conjunctiva/sclera: Conjunctivae normal.      Pupils: Pupils are equal, round, and reactive to light. Neck:      Trachea: Trachea normal. No tracheal deviation. Cardiovascular:      Rate and Rhythm: Normal rate and regular rhythm. Heart sounds: Normal heart sounds. No murmur heard. Pulmonary:      Effort: Pulmonary effort is normal. No respiratory distress. Breath sounds: Normal breath sounds. No stridor. No decreased breath sounds or wheezing. Abdominal:      General: Bowel sounds are normal. There is no distension. Palpations: Abdomen is soft. Abdomen is not rigid. There is no pulsatile mass. Tenderness: There is abdominal tenderness. There is guarding (Voluntary, patient would not relax his abdomen on exam). There is no right CVA tenderness, left CVA tenderness or rebound. Negative signs include Ponce's sign and McBurney's sign. Hernia: No hernia is present. Musculoskeletal:         General: Normal range of motion. Cervical back: No rigidity. No muscular tenderness. Comments: Movement normal as observed   Lymphadenopathy:      Cervical: No cervical adenopathy. Skin:     General: Skin is warm and dry. Coloration: Skin is not pale. Findings: No rash. Neurological:      General: No focal deficit present. Mental Status: He is alert and oriented to person, place, and time. Sensory: No sensory deficit. Gait: Gait normal.   Psychiatric:         Attention and Perception: Attention normal.         Mood and Affect: Mood normal.         Speech: Speech normal.         Behavior: Behavior normal. Behavior is cooperative. Thought Content: Thought content normal.         Cognition and Memory: Cognition normal.         DIFFERENTIAL DIAGNOSIS:   Including but not limited to: Constipation, gastroenteritis, food poisoning, gastritis, colitis, enteritis    DIAGNOSTIC RESULTS     EKG: All [facility]'s are [facility] who either signs or Co-signs this chart in the absence of a cardiologist.  None    RADIOLOGY: non-plain film images(s) such as CT,Ultrasound and MRI are read by the radiologist.  Plain radiographic images are visualized and preliminarily interpreted by the emergency physician unless otherwise stated below. XR SHOULDER LEFT (MIN 2 VIEWS)   Final Result   Impression:   1. Normal left shoulder      This document has been electronically signed by:  Danish Gonzalez Delia Andrade MD on 06/21/2021 08:36 PM      XR ABDOMEN (KUB) (SINGLE AP VIEW)   Final Result   Impression:   1. Moderate stool. This document has been electronically signed by: Emily Burr MD on 06/21/2021 08:38 PM          LABS:   Labs Reviewed   BASIC METABOLIC PANEL - Abnormal; Notable for the following components:       Result Value    Glucose 164 (*)     All other components within normal limits   HEPATIC FUNCTION PANEL - Abnormal; Notable for the following components: Total Protein 8.2 (*)     All other components within normal limits   LACTIC ACID, PLASMA - Abnormal; Notable for the following components:    Lactic Acid 2.6 (*)     All other components within normal limits   CBC WITH AUTO DIFFERENTIAL   LIPASE   PROCALCITONIN   ANION GAP   OSMOLALITY   GLOMERULAR FILTRATION RATE, ESTIMATED   URINE RT REFLEX TO CULTURE       EMERGENCY DEPARTMENT COURSE:   Vitals:    Vitals:    06/21/21 1900 06/21/21 1925 06/21/21 2016 06/21/21 2044   BP: (!) 150/84 (!) 148/89 134/85 133/86   Pulse:  108 103 111   Resp:  18 18 18   Temp:       TempSrc:       SpO2:  94% 96% 95%   Weight:       Height:           MDM:  The patient was seen and evaluated within the ED today for abdominal pain with nausea and vomiting. On exam, I appreciated generalized abdominal tenderness with voluntary guarding. Old records were reviewed. Within the department, I observed the patient's vital signs to be within acceptable range, although pulse was elevated. Radiological studies within the department revealed constipation. Laboratory work was reassuring, although lactic acid noted 2.6. Within the department the patient was treated with saline, Phenergan, and Vistaril. I observed the patient's condition to modestly remain stable during the duration of their stay. On re-exam the patient was found to have eloped the department.   I also went to discuss with the patient possibility of performing a CT scan due to the elevated lactic acid and tachycardia. Attempts were made to find the patient inside and outside of the department but were unsuccessful. CRITICAL CARE:   None    CONSULTS:  None    PROCEDURES:  None    FINAL IMPRESSION      1. Abdominal pain, epigastric    2. Non-intractable vomiting with nausea, unspecified vomiting type    3. Eloped from emergency department    4. Constipation, unspecified constipation type    5. Acute pain of left shoulder          DISPOSITION/PLAN     1. Abdominal pain, epigastric    2. Non-intractable vomiting with nausea, unspecified vomiting type    3. Eloped from emergency department    4. Constipation, unspecified constipation type    5.  Acute pain of left shoulder    Patient eloped the emergency room    (Please note that portions of this note were completed with a voice recognition program.  Efforts were made to edit the dictations but occasionally words are mis-transcribed.)    Jacques Weldon PA-C 06/21/21 9:34 PM    RACQUEL Vaughn Ace, PA-C  06/21/21 3479

## 2021-06-22 NOTE — ED PROVIDER NOTES
[facility] ENCOUNTER          Pt Name: Essence Simental  MRN: [id number]  [place] 1990  Date of evaluation: 6/22/2021  Emergency Physician: Carolin Christianson, [facility]       Chief Complaint   Patient presents with    Nausea     History obtained from the patient. HISTORY OF PRESENT ILLNESS    HPI  Essence Simental is a 27 y.o. male who presents to the emergency department for evaluation of nausea. Patient with a history of irritable bowel syndrome who complains of nausea. Its been ongoing for the last month. Reported abdominal discomfort. He states he feels like he just needs to throw up. He is scheduled to follow-up with GI. He states that is too far in advance. He currently rates his symptoms as 3 out of 10. The patient has no other acute complaints at this time. REVIEW OF SYSTEMS   Review of Systems   Constitutional: Negative for chills and fever. Gastrointestinal: Positive for nausea. Negative for abdominal pain and rectal pain. Genitourinary: Negative for decreased urine volume, difficulty urinating, flank pain and urgency. Musculoskeletal: Negative for back pain and joint swelling. Neurological: Negative for numbness. Hematological: Does not bruise/bleed easily.          PAST MEDICAL AND SURGICAL HISTORY     Past Medical History:   Diagnosis Date    Anxiety     Arthritis     Asthma     Bipolar I disorder, most recent episode (or current) depressed, unspecified 9/12/2014    Clostridium difficile infection     COPD (chronic obstructive pulmonary disease) ([address]     Depression     Disease of blood and blood forming organ     Eczema     Fracture, metacarpal     R 4th and 5th    Gastric ulcer     Gastritis 06/13/2018    GERD (gastroesophageal reflux disease)     GI bleed     H. 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[facility]     Substance abuse ([facility]     Suicidal ideation     Suicide attempt by hanging ([facility]     Tobacco dependence     Ulcerative colitis ([facility]     UTI (urinary tract infection)      Past Surgical History:   Procedure Laterality Date    ABDOMEN SURGERY      upper GI scope 7/7/2015    BRONCHOSCOPY      CHOLECYSTECTOMY, LAPAROSCOPIC  07/14/2017    surgery performed at [address] [place] [address], COLON, DIAGNOSTIC      OTHER SURGICAL HISTORY      lumbar puncture    MN COLONOSCOPY W/BIOPSY SINGLE/MULTIPLE  8/9/2017    COLONOSCOPY WITH BIOPSY performed by Samanta Hoyos MD at [facility] EGD TRANSORAL BIOPSY SINGLE/MULTIPLE N/A 3/20/2017    EGD BIOPSY performed by Arminad Vasquez MD at [facility]    MN ESOPHAGOGASTRODUODENOSCOPY TRANSORAL DIAGNOSTIC N/A 8/9/2017    EGD ESOPHAGOGASTRODUODENOSCOPY performed by Samanta Hoyos MD at [address] [place] [address] N/A 3/20/2017    [facility] performed by Arminda Vasquez MD at [facility]  2/4/16    UPPER GASTROINTESTINAL ENDOSCOPY N/A 6/13/2018    GASTRITIS    UPPER GASTROINTESTINAL ENDOSCOPY N/A 9/20/2018    EGD BIOPSY performed by Gunner Elizondo MD at [facility]     Current Facility-Administered Medications:     ondansetron (ZOFRAN) tablet 4 mg, 4 mg, Oral, Once, Renetta Garrido DO    Current Outpatient Medications:     promethazine (PHENERGAN) 25 MG tablet, Take 1 tablet by mouth 3 times daily as needed for Nausea, Disp: 12 tablet, Rfl: 0    pantoprazole (PROTONIX) 40 MG tablet, Take 1 tablet by mouth every morning (before breakfast), Disp: 30 tablet, Rfl: 0    naloxone 4 MG/0.1ML LIQD nasal spray, 1 spray by Nasal route as Every Day Smoker     Packs/day: 1.00     Years: 5.00     Pack years: 5.00     Types: Cigarettes    Smokeless tobacco: Never Used   Vaping Use    Vaping Use: Former   Substance Use Topics    Alcohol use: Not Currently    Drug use: No     Comment: last used pain fill/fentanyl 2/18/21         ALLERGIES     Allergies   Allergen Reactions    Dicyclomine Anaphylaxis    Famotidine Anaphylaxis    Geodon [Ziprasidone Hcl] Anaphylaxis    Haloperidol Anaphylaxis     Other reaction(s): throat swells  Throat swells      Iv Dye [Iodides] Nausea And Vomiting and Rash     Ok to take with benadryl - itching only no rash or anaphylaxis  Needs benadryl.     Ibuprofen      Other reaction(s): GI Intolerance    Aspirin      Pressure in skin    Dicyclomine Hcl      08--allergy removed-pt sts today it is his allergy  Other reaction(s): Itching    Flagyl [Metronidazole] Swelling     Patient says he isn't allergic to this med 08-  Today 08- it is an allergy  02/13/19 states he is not allergic     Iodine      Other reaction(s): Hives, Itching, Nausea/Vomiting    Mirtazapine          FAMILY HISTORY     Family History   Problem Relation Age of Onset    Diabetes Father     Alcohol Abuse Father     Depression Father     Arthritis Father     High Blood Pressure Father     Other Father         aneurysm & epilepsy    Migraines Father     Arthritis Mother     Other Mother         aneurysm & epilepsy    Migraines Mother     Diabetes Brother         Aunt and uncles    Depression Brother     Mental Illness Brother     Other Brother         epilepsy    Migraines Brother     Stroke Other         Uncle    Other Brother         murdered Oct 6th, 2014    Colon Cancer Paternal Cousin 43    Other Sister         epilepsy    Migraines Sister          PHYSICAL EXAM     ED Triage Vitals [06/22/21 1704]   BP Temp Temp Source Pulse Resp SpO2 Height Weight   (!) 154/98 98.2 °F (36.8 °C) Oral 97 16 96 % -- -- Additional Vital Signs:  Vitals:    06/22/21 1704   BP: (!) 154/98   Pulse: 97   Resp: 16   Temp: 98.2 °F (36.8 °C)   SpO2: 96%       Physical Exam  Vitals and nursing note reviewed. Constitutional:       General: He is not in acute distress. Appearance: He is not ill-appearing or toxic-appearing. HENT:      Head: Normocephalic. Mouth/Throat:      Mouth: Mucous membranes are moist.   Cardiovascular:      Rate and Rhythm: Normal rate and regular rhythm. Pulses: Normal pulses. Heart sounds: Normal heart sounds. Pulmonary:      Effort: Pulmonary effort is normal.      Breath sounds: Normal breath sounds. Abdominal:      General: Abdomen is flat. Palpations: Abdomen is soft. Tenderness: There is no abdominal tenderness. Skin:     General: Skin is warm and dry. Neurological:      Mental Status: He is alert. Gait: Gait normal.         Initial vital signs and nursing assessment reviewed and normal.   Pulsoximetry is normal per my interpretation. MEDICAL DECISION MAKING   Initial Assessment: Given the patient's above chief complaint and findings on history and physical examination, I thought it was appropriate to consider the following emergency medical conditions: Enteritis, gastroenteritis, abdominal pain, chronic nausea, electrolyte derangement although some of these diagnoses are unlikely they were considered in my medical decision making. Plan: Symptomatic treatment with Zofran and reassess         ED RESULTS   Laboratory results:  Labs Reviewed - No data to display    Radiologic studies results:  No orders to display       ED Medications administered this visit:   Medications   ondansetron (ZOFRAN) tablet 4 mg (4 mg Oral Not Given 6/22/21 1749)         ED COURSE    Abdominal exam is benign. Patient appears well-hydrated. Zofran was ordered. Patient left before ED treatment complete.       The diagnosis, extensive differential diagnosis, laboratory and imaging findings were discussed at the bedside. The patient was an active participant in their care. They are agreeable to the plan of care. All questions and concerns were addressed at the time of the encounter. MEDICATION CHANGES     DISCHARGE MEDICATIONS:  Discharge Medication List as of 6/22/2021  6:03 PM               FINAL DISPOSITION     Final diagnoses:   Nausea     Condition: condition: good  Dispo: Discharge to home    PATIENT REFERRED TO:  No follow-up provider specified. Critical Care Time   None      This transcription was electronically signed. Parts of this transcriptions may have been dictated by use of voice recognition software and electronically transcribed, and parts may have been transcribed with the assistance of an ED scribe. The transcription may contain errors not detected in proofreading.     Electronically Signed: Hemant Jerry DO, 06/22/21, 6:04 PM      Hemant Jerry DO  06/22/21 [id number]

## 2021-06-22 NOTE — ED NOTES
RN walked into pt room. Pt was not found to be in room or in bathrooms. IV found on the ground. RN notified Argenis Ribeiro.       Chase Persaud RN  06/21/21 2124

## 2021-06-26 ENCOUNTER — HOSPITAL ENCOUNTER (EMERGENCY)
Age: 31
Discharge: HOME | End: 2021-06-26
Attending: EMERGENCY MEDICINE
Payer: MEDICARE

## 2021-06-26 VITALS
OXYGEN SATURATION: 97 % | TEMPERATURE: 97 F | BODY MASS INDEX: 31.1 KG/M2 | DIASTOLIC BLOOD PRESSURE: 93 MMHG | HEIGHT: 69 IN | SYSTOLIC BLOOD PRESSURE: 151 MMHG | WEIGHT: 210 LBS | RESPIRATION RATE: 16 BRPM | HEART RATE: 101 BPM

## 2021-06-26 DIAGNOSIS — B86 SCABIES: Primary | ICD-10-CM

## 2021-06-26 PROCEDURE — 6370000000 HC RX 637 (ALT 250 FOR IP): Performed by: STUDENT IN AN ORGANIZED HEALTH CARE EDUCATION/TRAINING PROGRAM

## 2021-06-26 PROCEDURE — 99283 EMERGENCY DEPT VISIT LOW MDM: CPT

## 2021-06-26 RX ORDER — HYDROXYZINE HYDROCHLORIDE 25 MG/1
25 TABLET, FILM COATED ORAL ONCE
Status: COMPLETED | OUTPATIENT
Start: 2021-06-26 | End: 2021-06-26

## 2021-06-26 RX ORDER — HYDROXYZINE HYDROCHLORIDE 25 MG/1
25 TABLET, FILM COATED ORAL EVERY 6 HOURS PRN
Qty: 20 TABLET | Refills: 0 | Status: ON HOLD | OUTPATIENT
Start: 2021-06-26 | End: 2021-06-30 | Stop reason: HOSPADM

## 2021-06-26 RX ORDER — PERMETHRIN 50 MG/G
CREAM TOPICAL
Qty: 1 TUBE | Refills: 1 | Status: ON HOLD | OUTPATIENT
Start: 2021-06-26 | End: 2021-06-30 | Stop reason: HOSPADM

## 2021-06-26 RX ADMIN — HYDROXYZINE HYDROCHLORIDE 25 MG: 25 TABLET, FILM COATED ORAL at 16:08

## 2021-06-26 ASSESSMENT — ENCOUNTER SYMPTOMS
NAUSEA: 0
SORE THROAT: 0
CONSTIPATION: 0
ABDOMINAL PAIN: 0
DIARRHEA: 0
VOMITING: 0
SHORTNESS OF BREATH: 0

## 2021-06-26 NOTE — ED PROVIDER NOTES
[facility] ED  Emergency Department Encounter  Emergency Medicine Resident     Pt Aaron Mancera  MRN: [id number]  [place] 1990  Date of evaluation: 6/26/21  PCP:  No primary care provider on file. CHIEF COMPLAINT       Chief Complaint   Patient presents with    Pruritis     roomate has scabies        HISTORY OF PRESENT ILLNESS  (Location/Symptom, Timing/Onset, Context/Setting, Quality, Duration, Modifying Factors, Severity.)      Chriss Mcmillan is a 27 y.o. male who presents with diffuse itching across the neck and upper extremities with report of small lesions most noticeable on the neck. Roommate was diagnosed with scabies yesterday, patient would also like to be treated. No history of liver disease, no other associated symptoms, no fevers chills headache vision changes nausea vomiting chest pain shortness of breath abdominal pain constipation or diarrhea. PAST MEDICAL / SURGICAL / SOCIAL / FAMILY HISTORY      has a past medical history of Anxiety, Arthritis, Asthma, Bipolar I disorder, most recent episode (or current) depressed, unspecified, Clostridium difficile infection, COPD (chronic obstructive pulmonary disease) ([address] Depression, Disease of blood and blood forming organ, Eczema, Fracture, metacarpal, Gastric ulcer, Gastritis, GERD (gastroesophageal reflux disease), GI bleed, H. pylori infection, H/O blood clots, Head injury, Headache, Insomnia, Juvenile rheumatoid arthritis ([address] Neuromuscular disorder ([address] PFAPA syndrome ([address] PUD (peptic ulcer disease), Rheumatoid arthritis ([address] Rheumatoid arthritis(714.0), Sleep apnea, 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[address] Substance abuse ([address] Suicidal ideation, Suicide attempt by hanging ([address] Tobacco dependence, Ulcerative colitis ([address] and UTI (urinary tract infection). has a past surgical history that includes Colonoscopy; bronchoscopy; other surgical history;  Upper gastrointestinal endoscopy (2/4/16); pr egd transoral biopsy single/multiple (N/A, 3/20/2017); sigmoidoscopy (N/A, 3/20/2017); Cholecystectomy, laparoscopic (07/14/2017); pr esophagogastroduodenoscopy transoral diagnostic (N/A, 8/9/2017); pr colonoscopy w/biopsy single/multiple (8/9/2017); Abdomen surgery; Upper gastrointestinal endoscopy (N/A, 6/13/2018); Upper gastrointestinal endoscopy (N/A, 9/20/2018); and Endoscopy, colon, diagnostic. Social History     Socioeconomic History    Marital status: Single     Spouse name: Not on file    Number of children: Not on file    Years of education: Not on file    Highest education level: Not on file   Occupational History    Occupation: disability   Tobacco Use    Smoking status: Current Every Day Smoker     Packs/day: 1.00     Years: 15.00     Pack years: 15.00     Types: Cigarettes    Smokeless tobacco: Never Used   Vaping Use    Vaping Use: Former   Substance and Sexual Activity    Alcohol use: Yes     Comment: social ETOH use    Drug use: Not Currently     Types: Opiates      Comment: last used pain pill/fentanyl 2/18/21    Sexual activity: Yes     Partners: Female     Comment: Lives alone, not working. Other Topics Concern    Not on file   Social History Narrative    ** Merged History Encounter **          Social Determinants of Health     Financial Resource Strain: Unknown    Difficulty of Paying Living Expenses: Patient refused   Food Insecurity: Unknown    Worried About Running Out of Food in the Last Year: Patient refused    [address] in the Last Year: Patient refused   Transportation Needs:     Lack of Transportation (Medical):      Lack of Transportation (Non-Medical):    Physical Activity:     Days of Exercise per Week:     Minutes of Exercise per Session:    Stress:     Feeling of Stress :    Social Connections:     Frequency of Communication with Friends and Family:     Frequency of Social Gatherings with Friends and Family:     Attends [employer]az[employer] Services:     Active Member of Clubs or Organizations:     Attends Club or Organization Meetings:     Marital Status:    Intimate Partner Violence:     Fear of Current or Ex-Partner:     Emotionally Abused:     Physically Abused:     Sexually Abused:        Family History   Problem Relation Age of Onset    Diabetes Father     Alcohol Abuse Father     Depression Father     Arthritis Father     High Blood Pressure Father     Other Father         aneurysm & epilepsy    Migraines Father     Arthritis Mother     Other Mother         aneurysm & epilepsy    Migraines Mother     Diabetes Brother         Aunt and uncles    Depression Brother     Mental Illness Brother     Other Brother         epilepsy    Migraines Brother     Stroke Other         Uncle    Other Brother         murdered Oct 6th, 2014    Colon Cancer Paternal Cousin 43    Other Sister         epilepsy   [place] Harpin Migraines Sister        Allergies:  Dicyclomine, Famotidine, Geodon [ziprasidone hcl], Haloperidol, Iv dye [iodides], Ibuprofen, Aspirin, Dicyclomine hcl, Flagyl [metronidazole], Iodine, and Mirtazapine    Home Medications:  Prior to Admission medications    Medication Sig Start Date End Date Taking? Authorizing Provider   permethrin (ELIMITE) 5 % cream Apply topically to entire body or affected areas once, leave on for 8 hours, then rinse.   May repeat in 1-2 weeks if symptoms persist. 6/26/21  Yes Lisa Echavarria MD   hydrOXYzine (ATARAX) 25 MG tablet Take 1 tablet by mouth every 6 hours as needed for Itching 6/26/21 7/6/21 Yes Lias Echavarria MD   pantoprazole (PROTONIX) 40 MG tablet Take 1 tablet by mouth every morning (before breakfast) 6/18/21   Valentino Ming, MD   naloxone 4 MG/0.1ML LIQD nasal spray 1 spray by Nasal route as needed for Opioid Reversal 4/1/21   MASSIEL Massey CNP   tiZANidine (ZANAFLEX) 4 MG tablet Take 1 tablet by mouth every 8 hours as needed (HEADACHES, BACK PAIN) 3/8/21   MASSIEL Massey CNP   DULoxetine (CYMBALTA) 30 MG extended release capsule Take 30 mg by mouth daily    Historical Provider, MD   gabapentin (NEURONTIN) 600 MG tablet Take 1 tablet by mouth 3 times daily for 30 days. 2/25/21 3/27/21  MASSIEL Otoole CNP   cloNIDine (CATAPRES) 0.1 MG tablet Take 1 tablet by mouth 3 times daily 2/25/21   MASSIEL Otoole CNP   ibuprofen (ADVIL;MOTRIN) 600 MG tablet Take 600 mg by mouth every 8 hours as needed for Pain    Historical Provider, MD   traZODone (DESYREL) 100 MG tablet Take 100 mg by mouth nightly    Historical Provider, MD   Suvorexant (BELSOMRA) 20 MG TABS Take 1 tablet by mouth nightly. Historical Provider, MD   QUEtiapine (SEROQUEL) 200 MG tablet Take 200 mg by mouth nightly    Historical Provider, MD   meloxicam (MOBIC) 7.5 MG tablet Take 1 tablet by mouth daily 9/19/20   Petrona Pinon DO   acetaminophen (TYLENOL) 500 MG tablet Take 1 tablet by mouth every 4 hours as needed for Pain 9/19/20   Petrona Pinon DO   albuterol sulfate HFA (VENTOLIN HFA) 108 (90 Base) MCG/ACT inhaler Inhale 2 puffs into the lungs every 6 hours as needed for Wheezing  Patient taking differently: Inhale 2 puffs into the lungs every 6 hours as needed for Wheezing  10/11/19   Mckenna Chand MD   melatonin ER 1 MG TBCR tablet Take 1 tablet by mouth nightly as needed (Sleep) 2/27/19   MASSIEL Hayes CNP   Fluticasone Furoate-Vilanterol (BREO ELLIPTA) 200-25 MCG/INH AEPB Inhale 1 puff into the lungs daily 10/1/18   Trini Rodriguez MD   vitamin D (ERGOCALCIFEROL) 52467 units capsule Take 1 capsule by mouth once a week  Patient taking differently: Take 50,000 Units by mouth once a week Takes every Wed 10/6/18   Trini Rodriguez MD       REVIEW OF SYSTEMS    (2-9 systems for level 4, 10 or more for level 5)      Review of Systems   Constitutional: Negative for fever. HENT: Negative for sore throat. Eyes: Negative for visual disturbance.    Respiratory: Negative for shortness of breath. Cardiovascular: Negative for chest pain. Gastrointestinal: Negative for abdominal pain, constipation, diarrhea, nausea and vomiting. Genitourinary: Negative for decreased urine volume. Musculoskeletal: Negative for arthralgias and myalgias. Skin: Negative for wound. Pruritic lesions on neck and hands   Neurological: Negative for weakness, light-headedness and headaches. Psychiatric/Behavioral: Negative for confusion. PHYSICAL EXAM   (up to 7 for level 4, 8 or more for level 5)      INITIAL VITALS:   BP (!) 151/93   Pulse 101   Temp 97 °F (36.1 °C) (Oral)   Resp 16   Ht 5' 9\" (1.753 m)   Wt 210 lb (95.3 kg)   SpO2 97%   BMI 31.01 kg/m²     Physical Exam  Vitals and nursing note reviewed. Constitutional:       Appearance: Normal appearance. He is well-developed. HENT:      Head: Normocephalic and atraumatic. Right Ear: External ear normal.      Left Ear: External ear normal.      Nose: Nose normal.   Eyes:      General: No scleral icterus. Right eye: No discharge. Left eye: No discharge. Conjunctiva/sclera: Conjunctivae normal.   Neck:      Trachea: Trachea normal. No tracheal deviation. Cardiovascular:      Rate and Rhythm: Normal rate. Pulmonary:      Effort: Pulmonary effort is normal. No respiratory distress. Musculoskeletal:         General: No deformity. Normal range of motion. Cervical back: Normal range of motion and neck supple. Skin:     General: Skin is warm and dry. Capillary Refill: Capillary refill takes less than 2 seconds. Comments: Scratch marks in the bilateral arms from pruritus without definitive lesion, millimeter sized erythematous and raised lesions on the neck   Neurological:      General: No focal deficit present. Mental Status: He is alert and oriented to person, place, and time.    Psychiatric:         Mood and Affect: Mood normal.         Behavior: Behavior normal. DIFFERENTIAL  DIAGNOSIS     PLAN (LABS / IMAGING / EKG):  No orders of the defined types were placed in this encounter. MEDICATIONS ORDERED:  Orders Placed This Encounter   Medications    permethrin (ELIMITE) 5 % cream     Sig: Apply topically to entire body or affected areas once, leave on for 8 hours, then rinse. May repeat in 1-2 weeks if symptoms persist.     Dispense:  1 Tube     Refill:  1    hydrOXYzine (ATARAX) tablet 25 mg    hydrOXYzine (ATARAX) 25 MG tablet     Sig: Take 1 tablet by mouth every 6 hours as needed for Itching     Dispense:  20 tablet     Refill:  0       DDX: Scabies versus bedbugs versus fleas versus parasitosis versus other    DIAGNOSTIC RESULTS / EMERGENCY DEPARTMENT COURSE / MDM     LABS:  No results found for this visit on 06/26/21. RADIOLOGY:  None    EKG  None    All EKG's are interpreted by the Emergency Department Physician who either signs or Co-signs this chart in the absence of a cardiologist.    EMERGENCY DEPARTMENT COURSE:  Patient found seated upright in bed, no acute distress, not ill or toxic appearing. Engaged and cooperative in exam however anxious about the potential of having scabies, likely reason for underlying hypertension and mild tachycardia. Given patient's known exposure we will plan to treat for scabies but educated may also involve bedbugs or insects. Stressed the importance of following through with entire cleaning protocol not just permethrin. Patient follow-up with PCP for wound check after treatment. Referral provided to establish care. Discharge plan discussed with patient who is in agreement. Educated on likely pathology, medications, return precautions, and follow-up. Patient understood all educated materials with all questions answered to their satisfaction. PROCEDURES:  None    CONSULTS:  None    CRITICAL CARE:  None    FINAL IMPRESSION      1.  Scabies          DISPOSITION / PLAN     DISPOSITION Decision To Discharge 06/26/2021 04:05:57 PM      PATIENT REFERRED TO:  Zuleyma Adame MAYRA  [address] [facility] [facility] [address]  [phone]  Schedule an appointment as soon as possible for a visit   To establish care, Regarding this visit    [facility]  [address] [address] [address]  [phone]  Go to   If symptoms worsen      DISCHARGE MEDICATIONS:  Discharge Medication List as of 6/26/2021  4:06 PM      START taking these medications    Details   permethrin (ELIMITE) 5 % cream Apply topically to entire body or affected areas once, leave on for 8 hours, then rinse.   May repeat in 1-2 weeks if symptoms persist., Disp-1 Tube, R-1, Print      hydrOXYzine (ATARAX) 25 MG tablet Take 1 tablet by mouth every 6 hours as needed for Itching, Disp-20 tablet, R-0Print             Shari Groves MD  Emergency Medicine Resident    (Please note that portions of this note were completed with a voice recognition program.  Efforts were made to edit the dictations but occasionally words are mis-transcribed.)       Shari Groves MD  Resident  06/27/21 [id number]

## 2021-06-26 NOTE — ED PROVIDER NOTES
[address] [facility]     Emergency Department     Faculty Attestation    I performed a history and physical examination of the patient and discussed management with the resident. I reviewed the residents note and agree with the documented findings and plan of care. Any areas of disagreement are noted on the chart. I was personally present for the key portions of any procedures. I have documented in the chart those procedures where I was not present during the key portions. I have reviewed the emergency nurses triage note. I agree with the chief complaint, past medical history, past surgical history, allergies, medications, social and family history as documented unless otherwise noted below. For Physician Assistant/ Nurse Practitioner cases/documentation I have personally evaluated this patient and have completed at least one if not all key elements of the E/M (history, physical exam, and MDM). Additional findings are as noted. I have personally seen and evaluated the patient. I find the patient's history and physical exam are consistent with the NP/PA documentation. I agree with the care provided, treatment rendered, disposition and follow-up plan. Critical Care     Neto Costa M.D.   Attending Emergency  Physician              Eleazar Hammond MD  06/26/21 [id number]

## 2021-06-27 ENCOUNTER — HOSPITAL ENCOUNTER (EMERGENCY)
Age: 31
Discharge: LEFT BEFORE DISCHARGE | DRG: 885 | End: 2021-06-27
Attending: STUDENT IN AN ORGANIZED HEALTH CARE EDUCATION/TRAINING PROGRAM
Payer: MEDICARE

## 2021-06-27 ENCOUNTER — HOSPITAL ENCOUNTER (INPATIENT)
Age: 31
LOS: 3 days | Discharge: HOME | DRG: 885 | End: 2021-06-30
Attending: STUDENT IN AN ORGANIZED HEALTH CARE EDUCATION/TRAINING PROGRAM | Admitting: PSYCHIATRY & NEUROLOGY
Payer: MEDICARE

## 2021-06-27 VITALS
OXYGEN SATURATION: 95 % | DIASTOLIC BLOOD PRESSURE: 94 MMHG | SYSTOLIC BLOOD PRESSURE: 122 MMHG | HEART RATE: 97 BPM | WEIGHT: 210 LBS | TEMPERATURE: 97.8 F | BODY MASS INDEX: 31.1 KG/M2 | RESPIRATION RATE: 16 BRPM | HEIGHT: 69 IN

## 2021-06-27 DIAGNOSIS — F11.10 OPIOID ABUSE (HCC): ICD-10-CM

## 2021-06-27 DIAGNOSIS — R45.851 SUICIDAL IDEATION: Primary | ICD-10-CM

## 2021-06-27 DIAGNOSIS — G89.29 CHRONIC INTRACTABLE HEADACHE, UNSPECIFIED HEADACHE TYPE: ICD-10-CM

## 2021-06-27 DIAGNOSIS — L29.9 PRURITIC DISORDER: Primary | ICD-10-CM

## 2021-06-27 DIAGNOSIS — R45.851 SUICIDAL IDEATION: ICD-10-CM

## 2021-06-27 DIAGNOSIS — R51.9 CHRONIC INTRACTABLE HEADACHE, UNSPECIFIED HEADACHE TYPE: ICD-10-CM

## 2021-06-27 PROBLEM — F25.9 SCHIZOAFFECTIVE DISORDER (HCC): Status: ACTIVE | Noted: 2021-06-27

## 2021-06-27 LAB
-: ABNORMAL
ABSOLUTE EOS #: 0.2 K/UL (ref 0–0.4)
ABSOLUTE IMMATURE GRANULOCYTE: ABNORMAL K/UL (ref 0–0.3)
ABSOLUTE LYMPH #: 3.3 K/UL (ref 1–4.8)
ABSOLUTE MONO #: 0.4 K/UL (ref 0.1–1.3)
ALBUMIN SERPL-MCNC: 4 G/DL (ref 3.5–5.2)
ALBUMIN/GLOBULIN RATIO: ABNORMAL (ref 1–2.5)
ALP BLD-CCNC: 72 U/L (ref 40–129)
ALT SERPL-CCNC: <5 U/L (ref 5–41)
AMORPHOUS: ABNORMAL
AMPHETAMINE SCREEN URINE: NEGATIVE
ANION GAP SERPL CALCULATED.3IONS-SCNC: 9 MMOL/L (ref 9–17)
AST SERPL-CCNC: <5 U/L
BACTERIA: ABNORMAL
BARBITURATE SCREEN URINE: NEGATIVE
BASOPHILS # BLD: 1 % (ref 0–2)
BASOPHILS ABSOLUTE: 0.1 K/UL (ref 0–0.2)
BENZODIAZEPINE SCREEN, URINE: NEGATIVE
BILIRUB SERPL-MCNC: <0.15 MG/DL (ref 0.3–1.2)
BILIRUBIN URINE: ABNORMAL
BUN BLDV-MCNC: 12 MG/DL (ref 6–20)
BUN/CREAT BLD: ABNORMAL (ref 9–20)
BUPRENORPHINE URINE: ABNORMAL
CALCIUM SERPL-MCNC: 8.6 MG/DL (ref 8.6–10.4)
CANNABINOID SCREEN URINE: NEGATIVE
CASTS UA: ABNORMAL /LPF
CHLORIDE BLD-SCNC: 99 MMOL/L (ref 98–107)
CO2: 28 MMOL/L (ref 20–31)
COCAINE METABOLITE, URINE: NEGATIVE
COLOR: YELLOW
COMMENT UA: ABNORMAL
CREAT SERPL-MCNC: 0.81 MG/DL (ref 0.7–1.2)
CRYSTALS, UA: ABNORMAL /HPF
CRYSTALS, UA: ABNORMAL /HPF
DIFFERENTIAL TYPE: ABNORMAL
EOSINOPHILS RELATIVE PERCENT: 3 % (ref 0–4)
EPITHELIAL CELLS UA: ABNORMAL /HPF
ETHANOL PERCENT: <0.01 %
ETHANOL: <10 MG/DL
GFR AFRICAN AMERICAN: >60 ML/MIN
GFR NON-AFRICAN AMERICAN: >60 ML/MIN
GFR SERPL CREATININE-BSD FRML MDRD: ABNORMAL ML/MIN/{1.73_M2}
GFR SERPL CREATININE-BSD FRML MDRD: ABNORMAL ML/MIN/{1.73_M2}
GLUCOSE BLD-MCNC: 109 MG/DL (ref 70–99)
GLUCOSE URINE: NEGATIVE
HCT VFR BLD CALC: 40.3 % (ref 41–53)
HEMOGLOBIN: 13.6 G/DL (ref 13.5–17.5)
IMMATURE GRANULOCYTES: ABNORMAL %
KETONES, URINE: NEGATIVE
LEUKOCYTE ESTERASE, URINE: NEGATIVE
LYMPHOCYTES # BLD: 45 % (ref 24–44)
MCH RBC QN AUTO: 30 PG (ref 26–34)
MCHC RBC AUTO-ENTMCNC: 33.7 G/DL (ref 31–37)
MCV RBC AUTO: 88.9 FL (ref 80–100)
MDMA URINE: ABNORMAL
METHADONE SCREEN, URINE: NEGATIVE
METHAMPHETAMINE, URINE: ABNORMAL
MONOCYTES # BLD: 6 % (ref 1–7)
MUCUS: ABNORMAL
NITRITE, URINE: NEGATIVE
NRBC AUTOMATED: ABNORMAL PER 100 WBC
OPIATES, URINE: POSITIVE
OTHER OBSERVATIONS UA: ABNORMAL
OXYCODONE SCREEN URINE: NEGATIVE
PDW BLD-RTO: 13.6 % (ref 11.5–14.9)
PH UA: 6 (ref 5–8)
PHENCYCLIDINE, URINE: NEGATIVE
PLATELET # BLD: 250 K/UL (ref 150–450)
PLATELET ESTIMATE: ABNORMAL
PMV BLD AUTO: 7.8 FL (ref 6–12)
POTASSIUM SERPL-SCNC: 3.7 MMOL/L (ref 3.7–5.3)
PROPOXYPHENE, URINE: ABNORMAL
PROTEIN UA: NEGATIVE
RBC # BLD: 4.53 M/UL (ref 4.5–5.9)
RBC # BLD: ABNORMAL 10*6/UL
RBC UA: ABNORMAL /HPF
RENAL EPITHELIAL, UA: ABNORMAL /HPF
SARS-COV-2, RAPID: NOT DETECTED
SEG NEUTROPHILS: 45 % (ref 36–66)
SEGMENTED NEUTROPHILS ABSOLUTE COUNT: 3.2 K/UL (ref 1.3–9.1)
SODIUM BLD-SCNC: 136 MMOL/L (ref 135–144)
SPECIFIC GRAVITY UA: 1.04 (ref 1–1.03)
SPECIMEN DESCRIPTION: NORMAL
TEST INFORMATION: ABNORMAL
TOTAL PROTEIN: 7 G/DL (ref 6.4–8.3)
TRICHOMONAS: ABNORMAL
TRICYCLIC ANTIDEPRESSANTS, UR: ABNORMAL
TURBIDITY: CLEAR
URINE HGB: NEGATIVE
UROBILINOGEN, URINE: NORMAL
WBC # BLD: 7.2 K/UL (ref 3.5–11)
WBC # BLD: ABNORMAL 10*3/UL
WBC UA: ABNORMAL /HPF
YEAST: ABNORMAL

## 2021-06-27 PROCEDURE — 6370000000 HC RX 637 (ALT 250 FOR IP): Performed by: STUDENT IN AN ORGANIZED HEALTH CARE EDUCATION/TRAINING PROGRAM

## 2021-06-27 PROCEDURE — 36415 COLL VENOUS BLD VENIPUNCTURE: CPT

## 2021-06-27 PROCEDURE — 99283 EMERGENCY DEPT VISIT LOW MDM: CPT

## 2021-06-27 PROCEDURE — 80307 DRUG TEST PRSMV CHEM ANLYZR: CPT

## 2021-06-27 PROCEDURE — 85025 COMPLETE CBC W/AUTO DIFF WBC: CPT

## 2021-06-27 PROCEDURE — 87635 SARS-COV-2 COVID-19 AMP PRB: CPT

## 2021-06-27 PROCEDURE — 6370000000 HC RX 637 (ALT 250 FOR IP): Performed by: PSYCHIATRY & NEUROLOGY

## 2021-06-27 PROCEDURE — 1240000000 HC EMOTIONAL WELLNESS R&B

## 2021-06-27 PROCEDURE — G0480 DRUG TEST DEF 1-7 CLASSES: HCPCS

## 2021-06-27 PROCEDURE — 80053 COMPREHEN METABOLIC PANEL: CPT

## 2021-06-27 PROCEDURE — 81001 URINALYSIS AUTO W/SCOPE: CPT

## 2021-06-27 RX ORDER — NICOTINE 21 MG/24HR
1 PATCH, TRANSDERMAL 24 HOURS TRANSDERMAL DAILY
Status: DISCONTINUED | OUTPATIENT
Start: 2021-06-27 | End: 2021-06-28 | Stop reason: SDUPTHER

## 2021-06-27 RX ORDER — ACETAMINOPHEN 325 MG/1
650 TABLET ORAL EVERY 4 HOURS PRN
Status: DISCONTINUED | OUTPATIENT
Start: 2021-06-27 | End: 2021-06-30 | Stop reason: HOSPADM

## 2021-06-27 RX ORDER — OLANZAPINE 5 MG/1
5 TABLET ORAL EVERY 6 HOURS PRN
Status: DISCONTINUED | OUTPATIENT
Start: 2021-06-27 | End: 2021-06-30 | Stop reason: HOSPADM

## 2021-06-27 RX ORDER — POLYETHYLENE GLYCOL 3350 17 G/17G
17 POWDER, FOR SOLUTION ORAL DAILY PRN
Status: DISCONTINUED | OUTPATIENT
Start: 2021-06-27 | End: 2021-06-30 | Stop reason: HOSPADM

## 2021-06-27 RX ORDER — MAGNESIUM HYDROXIDE/ALUMINUM HYDROXICE/SIMETHICONE 120; 1200; 1200 MG/30ML; MG/30ML; MG/30ML
30 SUSPENSION ORAL EVERY 6 HOURS PRN
Status: DISCONTINUED | OUTPATIENT
Start: 2021-06-27 | End: 2021-06-30 | Stop reason: HOSPADM

## 2021-06-27 RX ORDER — HYDROXYZINE 50 MG/1
50 TABLET, FILM COATED ORAL 3 TIMES DAILY PRN
Status: DISCONTINUED | OUTPATIENT
Start: 2021-06-27 | End: 2021-06-28

## 2021-06-27 RX ORDER — OLANZAPINE 10 MG/1
5 INJECTION, POWDER, LYOPHILIZED, FOR SOLUTION INTRAMUSCULAR EVERY 6 HOURS PRN
Status: DISCONTINUED | OUTPATIENT
Start: 2021-06-27 | End: 2021-06-30 | Stop reason: HOSPADM

## 2021-06-27 RX ORDER — DIPHENHYDRAMINE HCL 25 MG
25 TABLET ORAL ONCE
Status: DISCONTINUED | OUTPATIENT
Start: 2021-06-27 | End: 2021-06-27 | Stop reason: HOSPADM

## 2021-06-27 RX ORDER — NICOTINE 21 MG/24HR
1 PATCH, TRANSDERMAL 24 HOURS TRANSDERMAL DAILY
Status: DISCONTINUED | OUTPATIENT
Start: 2021-06-27 | End: 2021-06-30 | Stop reason: HOSPADM

## 2021-06-27 RX ORDER — TRAZODONE HYDROCHLORIDE 50 MG/1
50 TABLET ORAL NIGHTLY PRN
Status: DISCONTINUED | OUTPATIENT
Start: 2021-06-27 | End: 2021-06-28

## 2021-06-27 RX ADMIN — TRAZODONE HYDROCHLORIDE 50 MG: 50 TABLET ORAL at 20:36

## 2021-06-27 RX ADMIN — HYDROXYZINE HYDROCHLORIDE 50 MG: 50 TABLET, FILM COATED ORAL at 20:36

## 2021-06-27 ASSESSMENT — ENCOUNTER SYMPTOMS
RHINORRHEA: 0
COUGH: 0
RHINORRHEA: 0
EYE PAIN: 0
COUGH: 0
VOMITING: 0
EYE REDNESS: 0
ABDOMINAL PAIN: 0
ABDOMINAL PAIN: 0
VOMITING: 0
DIARRHEA: 0
NAUSEA: 0
NAUSEA: 0
EYE REDNESS: 0
FACIAL SWELLING: 0
BACK PAIN: 0
EYE PAIN: 0
SORE THROAT: 0
DIARRHEA: 0
COLOR CHANGE: 0
SHORTNESS OF BREATH: 0
BACK PAIN: 0
SORE THROAT: 0
SHORTNESS OF BREATH: 0
COLOR CHANGE: 0
FACIAL SWELLING: 0

## 2021-06-27 ASSESSMENT — SLEEP AND FATIGUE QUESTIONNAIRES
DO YOU HAVE DIFFICULTY SLEEPING: YES
DIFFICULTY ARISING: NO
DIFFICULTY FALLING ASLEEP: YES
DIFFICULTY STAYING ASLEEP: YES
DO YOU USE A SLEEP AID: NO
RESTFUL SLEEP: NO
SLEEP PATTERN: DIFFICULTY FALLING ASLEEP;RESTLESSNESS
AVERAGE NUMBER OF SLEEP HOURS: 3

## 2021-06-27 ASSESSMENT — PAIN DESCRIPTION - DESCRIPTORS: DESCRIPTORS: ITCHING

## 2021-06-27 ASSESSMENT — PAIN SCALES - GENERAL
PAINLEVEL_OUTOF10: 10
PAINLEVEL_OUTOF10: 0

## 2021-06-27 ASSESSMENT — LIFESTYLE VARIABLES: HISTORY_ALCOHOL_USE: NO

## 2021-06-27 NOTE — ED NOTES
Pt states he is here for generalized itching and also states \"I think I need to talk to someone for anxiety/depression. \" Pt denies SI stating he feels depressed.       Steffany Ashraf RN  06/27/21 [id number]

## 2021-06-27 NOTE — ED NOTES
Handoff given to [facility] staff, Drea Obrien RN.       Mariaelena Alatorre  06/27/21 [id number]

## 2021-06-27 NOTE — ED PROVIDER NOTES
EMERGENCY DEPARTMENT ENCOUNTER    Pt Name: Luis Jones  MRN: [id number]  [place] 1990  Date of evaluation: 6/27/21  CHIEF COMPLAINT       Chief Complaint   Patient presents with    Pruritis    Anxiety    Depression     HISTORY OF PRESENT ILLNESS   HPI  79-year-old male presents for evaluation of generalized itching and some anxiety and depression. Patient states that he took a sleeping pill several days ago and since then has had some generalized itching. Has associated rash. No abdominal pain nausea or vomiting. Initially reported that he was having some worsening anxiety depression. Denied SI, HI. No other associated symptoms. Symptoms are moderate and constant. Treated previously at [facility] for itching given permethrin which he says he used. REVIEW OF SYSTEMS     Review of Systems   Constitutional: Negative for chills and fatigue. HENT: Negative for facial swelling, nosebleeds, rhinorrhea and sore throat. Eyes: Negative for pain and redness. Respiratory: Negative for cough and shortness of breath. Cardiovascular: Negative for chest pain and leg swelling. Gastrointestinal: Negative for abdominal pain, diarrhea, nausea and vomiting. Genitourinary: Negative for flank pain and hematuria. Musculoskeletal: Negative for arthralgias and back pain. Skin: Positive for rash. Negative for color change. Neurological: Negative for dizziness, tremors, facial asymmetry, speech difficulty, weakness and numbness. Psychiatric/Behavioral: Positive for behavioral problems. Negative for suicidal ideas.      PASTMEDICAL HISTORY     Past Medical History:   Diagnosis Date    Anxiety     Arthritis     Asthma     Bipolar I disorder, most recent episode (or current) depressed, unspecified 9/12/2014    Clostridium difficile infection     COPD (chronic obstructive pulmonary disease) (HCC)     Depression     Disease of blood and blood forming organ     Eczema     Fracture, metacarpal R 4th and 5th    Gastric ulcer     Gastritis 06/13/2018    GERD (gastroesophageal reflux disease)     GI bleed     H. 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[facility]     Substance abuse ([facility]     Suicidal ideation     [facility]     Tobacco dependence     Ulcerative colitis ([facility]     UTI (urinary tract infection)      Past Problem List  Patient Active Problem List   Diagnosis Code    Opioid abuse ([facility] F11.10    Noncompliance Z91.19    Rectal bleeding K62.5    Smoker F17.200    Juvenile rheumatoid arthritis ([facility] M08.00    SRIRAM (obstructive sleep apnea) G47.33    Primary insomnia F51.01    Calculus of bile duct with acute on chronic cholecystitis K80.46    Mild intermittent asthma without complication V19.11    Gastritis K29.70    Generalized abdominal pain R10.84    Anemia D64.9    Elevated liver enzymes R74.8    Nausea and vomiting R11.2    Opioid type dependence, continuous ([facility]) F11.20    Abdominal pain R10.9    Diarrhea R19.7    Irritable bowel syndrome with both constipation and diarrhea K58.2    Schizoaffective disorder, bipolar type ([facility] F25.0    GI bleed K92.2     SURGICAL HISTORY       Past Surgical History:   Procedure Laterality Date    ABDOMEN SURGERY      upper GI scope 7/7/2015    BRONCHOSCOPY      CHOLECYSTECTOMY, LAPAROSCOPIC  07/14/2017    surgery performed at [address] [place] [address], COLON, DIAGNOSTIC      OTHER SURGICAL HISTORY      lumbar puncture    MN COLONOSCOPY W/BIOPSY SINGLE/MULTIPLE  8/9/2017    COLONOSCOPY WITH BIOPSY performed by Greyson Samson MD at [facility]    MN EGD TRANSORAL BIOPSY SINGLE/MULTIPLE N/A 3/20/2017 EGD BIOPSY performed by Kiki Hightower MD at [facility]    MO ESOPHAGOGASTRODUODENOSCOPY TRANSORAL DIAGNOSTIC N/A 8/9/2017    EGD ESOPHAGOGASTRODUODENOSCOPY performed by Jose Miguel Brewer MD at [address] [employer] N/A 3/20/2017    SIGMOIDOSCOPY DIAGNOSTIC FLEXIBLE performed by Kiki Hightower MD at [facility]  2/4/16    UPPER GASTROINTESTINAL ENDOSCOPY N/A 6/13/2018    GASTRITIS    UPPER GASTROINTESTINAL ENDOSCOPY N/A 9/20/2018    EGD BIOPSY performed by Ladarius Mitchell MD at [address] [place] [address]       Discharge Medication List as of 6/27/2021  1:46 PM      CONTINUE these medications which have NOT CHANGED    Details   permethrin (ELIMITE) 5 % cream Apply topically to entire body or affected areas once, leave on for 8 hours, then rinse. May repeat in 1-2 weeks if symptoms persist., Disp-1 Tube, R-1, Print      hydrOXYzine (ATARAX) 25 MG tablet Take 1 tablet by mouth every 6 hours as needed for Itching, Disp-20 tablet, R-0Print      pantoprazole (PROTONIX) 40 MG tablet Take 1 tablet by mouth every morning (before breakfast), Disp-30 tablet, R-0Print      naloxone 4 MG/0.1ML LIQD nasal spray 1 spray by Nasal route as needed for Opioid Reversal, Disp-1 each, R-1Normal      tiZANidine (ZANAFLEX) 4 MG tablet Take 1 tablet by mouth every 8 hours as needed (HEADACHES, BACK PAIN), Disp-30 tablet, R-0Normal      DULoxetine (CYMBALTA) 30 MG extended release capsule Take 30 mg by mouth dailyHistorical Med      gabapentin (NEURONTIN) 600 MG tablet Take 1 tablet by mouth 3 times daily for 30 days. , Disp-90 tablet, R-0Normal      cloNIDine (CATAPRES) 0.1 MG tablet Take 1 tablet by mouth 3 times daily, Disp-90 tablet, R-1Normal      ibuprofen (ADVIL;MOTRIN) 600 MG tablet Take 600 mg by mouth every 8 hours as needed for PainHistorical Med      traZODone (DESYREL) 100 MG tablet Take 100 mg by mouth nightlyHistorical Med      Suvorexant (BELSOMRA) 20 MG TABS Take 1 tablet by mouth nightly. Historical Med      QUEtiapine (SEROQUEL) 200 MG tablet Take 200 mg by mouth nightlyHistorical Med      meloxicam (MOBIC) 7.5 MG tablet Take 1 tablet by mouth daily, Disp-30 tablet,R-0Print      acetaminophen (TYLENOL) 500 MG tablet Take 1 tablet by mouth every 4 hours as needed for Pain, Disp-20 tablet,R-0Print      albuterol sulfate HFA (VENTOLIN HFA) 108 (90 Base) MCG/ACT inhaler Inhale 2 puffs into the lungs every 6 hours as needed for Wheezing, Disp-18 g, R-0Print      melatonin ER 1 MG TBCR tablet Take 1 tablet by mouth nightly as needed (Sleep), Disp-14 tablet, R-0Normal      Fluticasone Furoate-Vilanterol (BREO ELLIPTA) 200-25 MCG/INH AEPB Inhale 1 puff into the lungs daily, Disp-1 each, R-0Normal      vitamin D (ERGOCALCIFEROL) 81478 units capsule Take 1 capsule by mouth once a week, Disp-30 capsule, R-0Normal           ALLERGIES     is allergic to dicyclomine, famotidine, geodon [ziprasidone hcl], haloperidol, iv dye [iodides], ibuprofen, aspirin, dicyclomine hcl, flagyl [metronidazole], iodine, and mirtazapine. FAMILY HISTORY     He indicated that his mother is alive. He indicated that his father is alive. He indicated that the status of his sister is unknown. He indicated that the status of his other is unknown. He indicated that the status of his paternal cousin is unknown.      SOCIAL HISTORY       Social History     Tobacco Use    Smoking status: Current Every Day Smoker     Packs/day: 1.00     Years: 15.00     Pack years: 15.00     Types: Cigarettes    Smokeless tobacco: Never Used   Vaping Use    Vaping Use: Former   Substance Use Topics    Alcohol use: Yes     Comment: social ETOH use    Drug use: Not Currently     Types: Opiates      Comment: last used pain pill/fentanyl 2/18/21     PHYSICAL EXAM     INITIAL VITALS: BP (!) 122/94   Pulse 97   Temp 97.8 °F (36.6 °C) (Oral)   Resp 16   Ht 5' 9\" (1.753 m)   Wt 210 lb (95.3 kg)   SpO2 95% BMI 31.01 kg/m²    Physical Exam  Constitutional:       Appearance: Normal appearance. HENT:      Head: Normocephalic and atraumatic. Nose: Nose normal.   Eyes:      Extraocular Movements: Extraocular movements intact. Pupils: Pupils are equal, round, and reactive to light. Cardiovascular:      Rate and Rhythm: Normal rate and regular rhythm. Pulmonary:      Effort: Pulmonary effort is normal. No respiratory distress. Breath sounds: Normal breath sounds. Abdominal:      General: Abdomen is flat. There is no distension. Palpations: Abdomen is soft. There is no mass. Musculoskeletal:         General: No swelling. Normal range of motion. Cervical back: Normal range of motion. No rigidity. Skin:     General: Skin is warm and dry. Neurological:      General: No focal deficit present. Mental Status: He is alert. Mental status is at baseline. Cranial Nerves: No cranial nerve deficit. Psychiatric:      Comments: Depressed mood denied si         MEDICAL DECISION MAKINyear old male presented for evaluation of itchy rash and requesting behavioral eval. Patient was seen by our SW and denied SI. Re-evaluated stated he wanted to go to [facility] for evaluation and was discharged with outpatient resources. CRITICAL CARE:       PROCEDURES:    Procedures    DIAGNOSTIC RESULTS   EKG:All EKG's are interpreted by the Emergency Department Physician who either signs or Co-signs this chart in the absence of a cardiologist.        RADIOLOGY:All plain film, CT, MRI, and formal ultrasound images (except ED bedside ultrasound) are read by the radiologist, see reports below, unless otherwisenoted in MDM or here. No orders to display     LABS: All lab results were reviewed by myself, and all abnormals are listed below.   Labs Reviewed   COVID-19, RAPID   CBC WITH AUTO DIFFERENTIAL   COMPREHENSIVE METABOLIC PANEL W/ REFLEX TO MG FOR LOW K   ETHANOL   URINE DRUG SCREEN URINALYSIS       EMERGENCY DEPARTMENTCOURSE:         Vitals:    Vitals:    06/27/21 1048   BP: (!) 122/94   Pulse: 97   Resp: 16   Temp: 97.8 °F (36.6 °C)   TempSrc: Oral   SpO2: 95%   Weight: 210 lb (95.3 kg)   Height: 5' 9\" (1.753 m)       The patient was given the following medications while in the emergency department:  Orders Placed This Encounter   Medications    DISCONTD: diphenhydrAMINE (BENADRYL) tablet 25 mg    DISCONTD: diphenhydrAMINE (BENADRYL) tablet 25 mg     CONSULTS:  None    FINAL IMPRESSION      1. Pruritic disorder    2.  Suicidal ideation          DISPOSITION/PLAN   DISPOSITION Eloped - Left Before Treatment Complete 06/27/2021 01:46:17 PM      PATIENT REFERRED TO:  [facility]  [address] [address]  [phone]  Call   As needed    DISCHARGE MEDICATIONS:  Discharge Medication List as of 6/27/2021  1:46 PM        Smitha Pierson MD  Attending Emergency Physician                    Smitha Pierson MD  06/27/21 5475

## 2021-06-27 NOTE — PROGRESS NOTES
Patient given tobacco quitline number 01694292208 at this time, refusing to call at this time, states \" I just dont want to quit now\"- patient given information as to the dangers of long term tobacco use. Continue to reinforce the importance of tobacco cessation.

## 2021-06-27 NOTE — ED NOTES
Provisional Diagnosis:   Schizoaffective Disorder     Psychosocial and Contextual Factors:   Patient reports suicidal ideations with plan to cut self with a knife. C-SSRS Summary:       Patient: X  Family: X- reports he lives with brother at [address]: not linked hx of noncompliance when linked with [facility] / was with [facility] in [address] reports graduated from program on Friday and he came back to [place] is not linked with anyone. Reports he is getting Suboxone from [facility] and runs out on Thursday and needs to be linked and continued for his script. Substance Abuse: Patient denies drug or alcohol use. Positive for opiates      Present Suicidal Behavior:  Patient reports suicidal ideation with a plan to cut self     Verbal: X     Attempt:     Past Suicidal Behavior: Patient reports he attempted to cut his wrist with when he was a child. Verbal: X     Attempt: X        Self-Injurious/Self-Mutilation: Patient denies current. Trauma Identified:  denies        Protective Factors:  Medicare, Medicaid, SSI, housing with brother     Risk Factors:   going to run out of medications, recently left [facility] on Friday, poor insight, hx of AOD use, poor coping/problem solving skills     Clinical Summary:   Luiz Varela is a 27 y.o.  male who presents to the ED for itching and anxiety and then he reported he was suicidal ideation with a plan cut his wrist when he was being discharged. Patient reports he was living in the [facility] area and recently returned to [place] a few days ago and is not linked and not on medications. Patient denies [facility] and reports poor sleep and appetite. Patient then denied any SI [facility] to staff and was discharged to rescue. Patient returned an hour and half later and reports rescue sent him here and that they had no beds open and were closing in a few days. Patient reported Jamie Horton was thinking about cutting myself I just need help before I get worse. Patient then reported he graduated [facility] program in [facility] and has a suboxone script that runs out on Thursday and needs to be linked with someone. Patient reports Dr Casey Monsalve was the doctor at [facility] and needs to dose. Patient continues to deny [facility] reports poor sleep with 5-6 hours and reports fair appetite. Denies any current AOD use and legal issues.         Level of Care Disposition:  to be medically cleared and psych consult to be made for criteria

## 2021-06-27 NOTE — ED PROVIDER NOTES
EMERGENCY DEPARTMENT ENCOUNTER    Pt Name: Graham Walters  MRN: [id number]  [place] 1990  Date of evaluation: 6/27/21  CHIEF COMPLAINT       Chief Complaint   Patient presents with    Psychiatric Evaluation     HISTORY OF PRESENT ILLNESS   HPI  80-year-old male history of schizoaffective bipolar presents for evaluation of suicidal ideations. Patient has a plan to cut himself. History of similar symptoms in the past.  Last inpatient admission was over a year ago. No actual attempted self-harm. No other physical symptoms. No hallucinations. REVIEW OF SYSTEMS     Review of Systems   Constitutional: Negative for chills and fatigue. HENT: Negative for facial swelling, nosebleeds, rhinorrhea and sore throat. Eyes: Negative for pain and redness. Respiratory: Negative for cough and shortness of breath. Cardiovascular: Negative for chest pain and leg swelling. Gastrointestinal: Negative for abdominal pain, diarrhea, nausea and vomiting. Genitourinary: Negative for flank pain and hematuria. Musculoskeletal: Negative for arthralgias and back pain. Skin: Negative for color change and rash. Neurological: Negative for dizziness, tremors, facial asymmetry, speech difficulty, weakness and numbness. Psychiatric/Behavioral: Positive for suicidal ideas. Negative for self-injury.      PASTMEDICAL HISTORY     Past Medical History:   Diagnosis Date    Anxiety     Arthritis     Asthma     Bipolar I disorder, most recent episode (or current) depressed, unspecified 9/12/2014    Clostridium difficile infection     COPD [facility] obstructive [facility] disease) [facility] [address] [facility]     Depression     Disease of blood and blood forming organ     Eczema     Fracture, metacarpal     R 4th and 5th    Gastric ulcer     Gastritis 06/13/2018    GERD (gastroesophageal reflux disease)     GI bleed     H. 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[facility] [address] [facility]     Substance abuse ([facility] [address] [facility]     Suicidal ideation     Suicide attempt by hanging ([facility] [address] [facility]     Tobacco dependence     Ulcerative colitis ([facility] [address] [facility]     UTI (urinary tract infection)      Past Problem List  Patient Active Problem List   Diagnosis Code    Opioid abuse ([facility] [address] F11.10    Noncompliance Z91.19    Rectal bleeding K62.5    Smoker F17.200    Juvenile rheumatoid arthritis ([facility] [address] M08.00    SRIRAM (obstructive sleep apnea) G47.33    Primary insomnia F51.01    Calculus of bile duct with acute on chronic cholecystitis K80.46    Mild intermittent asthma without complication V62.42    Gastritis K29.70    Generalized abdominal pain R10.84    Anemia D64.9    Elevated liver enzymes R74.8    Nausea and vomiting R11.2    Opioid type dependence, continuous ([facility]) F11.20    Abdominal pain R10.9    Diarrhea R19.7    Irritable bowel syndrome with both constipation and diarrhea K58.2    Schizoaffective disorder, bipolar type ([facility]) F25.0    GI bleed K92.2     SURGICAL HISTORY       Past Surgical History:   Procedure Laterality Date    ABDOMEN SURGERY      upper GI scope 7/7/2015    BRONCHOSCOPY      CHOLECYSTECTOMY, LAPAROSCOPIC  07/14/2017    surgery performed at [address] [place] [address], COLON, DIAGNOSTIC      OTHER SURGICAL HISTORY      lumbar puncture    [facility] COLONOSCOPY W/BIOPSY SINGLE/MULTIPLE  8/9/2017    COLONOSCOPY WITH BIOPSY performed by Agustin Butts MD at [facility]    NH EGD TRANSORAL BIOPSY SINGLE/MULTIPLE N/A 3/20/2017    EGD BIOPSY performed by Tj Harrison MD at [facility]    NH ESOPHAGOGASTRODUODENOSCOPY TRANSORAL DIAGNOSTIC N/A 8/9/2017    EGD ESOPHAGOGASTRODUODENOSCOPY performed by [facility]ust[facility] Butts [facility] at [facility]    SIGMOIDOSCOPY N/A 3/20/2017    SIGMOIDOSCOPY DIAGNOSTIC FLEXIBLE performed by Ousmane Nuñez MD at [facility]  2/4/16    UPPER GASTROINTESTINAL ENDOSCOPY N/A 6/13/2018    GASTRITIS    UPPER GASTROINTESTINAL ENDOSCOPY N/A 9/20/2018    EGD BIOPSY performed by Pat Marti MD at [address] [place] [address]       Previous Medications    ACETAMINOPHEN (TYLENOL) 500 MG TABLET    Take 1 tablet by mouth every 4 hours as needed for Pain    ALBUTEROL SULFATE HFA (VENTOLIN HFA) 108 (90 BASE) MCG/ACT INHALER    Inhale 2 puffs into the lungs every 6 hours as needed for Wheezing    CLONIDINE (CATAPRES) 0.1 MG TABLET    Take 1 tablet by mouth 3 times daily    DULOXETINE (CYMBALTA) 30 MG EXTENDED RELEASE CAPSULE    Take 30 mg by mouth daily    FLUTICASONE FUROATE-VILANTEROL (BREO ELLIPTA) 200-25 MCG/INH AEPB    Inhale 1 puff into the lungs daily    GABAPENTIN (NEURONTIN) 600 MG TABLET    Take 1 tablet by mouth 3 times daily for 30 days. HYDROXYZINE (ATARAX) 25 MG TABLET    Take 1 tablet by mouth every 6 hours as needed for Itching    IBUPROFEN (ADVIL;MOTRIN) 600 MG TABLET    Take 600 mg by mouth every 8 hours as needed for Pain    MELATONIN ER 1 MG TBCR TABLET    Take 1 tablet by mouth nightly as needed (Sleep)    MELOXICAM (MOBIC) 7.5 MG TABLET    Take 1 tablet by mouth daily    NALOXONE 4 MG/0.1ML LIQD NASAL SPRAY    1 spray by Nasal route as needed for Opioid Reversal    PANTOPRAZOLE (PROTONIX) 40 MG TABLET    Take 1 tablet by mouth every morning (before breakfast)    PERMETHRIN (ELIMITE) 5 % CREAM    Apply topically to entire body or affected areas once, leave on for 8 hours, then rinse. May repeat in 1-2 weeks if symptoms persist.    QUETIAPINE (SEROQUEL) 200 MG TABLET    Take 200 mg by mouth nightly    SUVOREXANT (BELSOMRA) 20 MG TABS    Take 1 tablet by mouth nightly.     TIZANIDINE (ZANAFLEX) 4 MG TABLET    Take 1 tablet by mouth every 8 hours as needed (HEADACHES, BACK PAIN)    TRAZODONE (DESYREL) 100 MG TABLET    Take 100 mg by mouth nightly    VITAMIN D (ERGOCALCIFEROL) 86295 UNITS CAPSULE    Take 1 capsule by mouth once a week     ALLERGIES     is allergic to dicyclomine, famotidine, geodon [ziprasidone hcl], haloperidol, iv dye [iodides], ibuprofen, aspirin, dicyclomine hcl, flagyl [metronidazole], iodine, and mirtazapine. FAMILY HISTORY     He indicated that his mother is alive. He indicated that his father is alive. He indicated that the status of his sister is unknown. He indicated that the status of his other is unknown. He indicated that the status of his paternal cousin is unknown. SOCIAL HISTORY       Social History     Tobacco Use    Smoking status: Current Every Day Smoker     Packs/day: 1.00     Years: 15.00     Pack years: 15.00     Types: Cigarettes    Smokeless tobacco: Never Used   Vaping Use    Vaping Use: Former   Substance Use Topics    Alcohol use: Yes     Comment: social ETOH use    Drug use: Not Currently     Types: Opiates      Comment: last used pain pill/fentanyl 2/18/21     PHYSICAL EXAM     INITIAL VITALS: BP (!) 148/81   Pulse 94   Temp 96.3 °F (35.7 °C) (Temporal)   Resp 20   Wt 210 lb (95.3 kg)   SpO2 95%   BMI 31.01 kg/m²    Physical Exam  Constitutional:       Appearance: Normal appearance. HENT:      Head: Normocephalic and atraumatic. Nose: Nose normal.   Eyes:      Extraocular Movements: Extraocular movements intact. Pupils: Pupils are equal, round, and reactive to light. Cardiovascular:      Rate and Rhythm: Normal rate and regular rhythm. Pulmonary:      Effort: Pulmonary effort is normal. No respiratory distress. Breath sounds: Normal breath sounds. Abdominal:      General: Abdomen is flat. There is no distension. Palpations: Abdomen is soft. There is no mass. Musculoskeletal:         General: No swelling. Normal range of motion. Cervical back: Normal range of motion. No rigidity. Skin:     General: Skin is warm and dry. Neurological:      General: No focal deficit present. Mental Status: He is alert. Mental status is at baseline. Cranial Nerves: No cranial nerve deficit. Psychiatric:      Comments: Suicidal ideations         MEDICAL DECISION MAKIN-year-old male presents for evaluation of suicidal ideation. Voluntary admit. We will do some basic labs to medically clear. Basic labs unremarkable. Patient medically cleared for psychiatric admission          CRITICAL CARE:       PROCEDURES:    Procedures    DIAGNOSTIC RESULTS   EKG:All EKG's are interpreted by the Emergency Department Physician who either signs or Co-signs this chart in the absence of a cardiologist.        RADIOLOGY:All plain film, CT, MRI, and formal ultrasound images (except ED bedside ultrasound) are read by the radiologist, see reports below, unless otherwisenoted in MDM or here. No orders to display     LABS: All lab results were reviewed by myself, and all abnormals are listed below. Labs Reviewed   CBC WITH AUTO DIFFERENTIAL - Abnormal; Notable for the following components:       Result Value    Hematocrit 40.3 (*)     Lymphocytes 45 (*)     All other components within normal limits   COMPREHENSIVE METABOLIC PANEL W/ REFLEX TO MG FOR LOW K - Abnormal; Notable for the following components:    Glucose 109 (*)     All other components within normal limits   URINALYSIS - Abnormal; Notable for the following components:    Bilirubin Urine   (*)     Value: Presumptive positive. Unable to confirm due to unavailability of reagent.     Specific [place], UA 1.036 (*)     All other components within normal limits   URINE DRUG SCREEN - Abnormal; Notable for the following components:    Opiates, Urine POSITIVE (*)     All other components within normal limits   MICROSCOPIC URINALYSIS - Abnormal; Notable for the following components:    Crystals, UA MODERATE (*)     Crystals, UA CALCIUM OXALATE (*) Bacteria, UA FEW (*)     All other components within normal limits   COVID-19, RAPID   ETHANOL       EMERGENCY DEPARTMENTCOURSE:         Vitals:    Vitals:    06/27/21 1457   BP: (!) 148/81   Pulse: 94   Resp: 20   Temp: 96.3 °F (35.7 °C)   TempSrc: Temporal   SpO2: 95%   Weight: 210 lb (95.3 kg)       The patient was given the following medications while in the emergency department:  Orders Placed This Encounter   Medications    nicotine (NICODERM CQ) 14 MG/24HR 1 patch     CONSULTS:  None    FINAL IMPRESSION      1. Suicidal ideation          DISPOSITION/PLAN   DISPOSITION Decision To Admit 06/27/2021 05:40:27 PM      PATIENT REFERRED TO:  No follow-up provider specified.   DISCHARGE MEDICATIONS:  New Prescriptions    No medications on file     Aparna Mera MD  Attending Emergency Physician                    Aparna Mera MD  06/27/21 0617

## 2021-06-27 NOTE — PROGRESS NOTES
Patient transferred to [facility] room 220 per provider order by 2 staff via wheelchair. Patient is dressed appropriately for situation but does appear slightly disheveled. Patient ambulates with a steady gait. He is cooperative with admission process and is compliant with admission paperwork.

## 2021-06-27 NOTE — ED NOTES
[facility] called and  left for case transfer with call back number. @[phone] phone call to Jaime ferrari and patient to be admitted under [facility] with SI - Dr Brando Multani admitting MD and voluntary and bed request faxed to [facility].

## 2021-06-27 NOTE — ED NOTES
Upon transport to the [facility] pt states he did not want to be in the [facility], wants to be admitted at [facility].  Pt also found out that his cab was here ealrier from the elopement. Pt states he is not going to harm himself or is a danger to himself at this time, in front of Dr. Prem Ronquillo and POWER. Pt exits ED and head to lobby to get into his cab.        Rosalind Mayer RN  06/27/21 4091

## 2021-06-28 PROCEDURE — 1240000000 HC EMOTIONAL WELLNESS R&B

## 2021-06-28 PROCEDURE — 6370000000 HC RX 637 (ALT 250 FOR IP): Performed by: PSYCHIATRY & NEUROLOGY

## 2021-06-28 PROCEDURE — APPSS30 APP SPLIT SHARED TIME 16-30 MINUTES: Performed by: PHYSICIAN ASSISTANT

## 2021-06-28 PROCEDURE — 6370000000 HC RX 637 (ALT 250 FOR IP): Performed by: PHYSICIAN ASSISTANT

## 2021-06-28 PROCEDURE — 99223 1ST HOSP IP/OBS HIGH 75: CPT | Performed by: PSYCHIATRY & NEUROLOGY

## 2021-06-28 RX ORDER — CLONIDINE HYDROCHLORIDE 0.1 MG/1
0.1 TABLET ORAL 3 TIMES DAILY
Status: DISCONTINUED | OUTPATIENT
Start: 2021-06-28 | End: 2021-06-30 | Stop reason: HOSPADM

## 2021-06-28 RX ORDER — M-VIT,TX,IRON,MINS/CALC/FOLIC 27MG-0.4MG
1 TABLET ORAL DAILY
Status: DISCONTINUED | OUTPATIENT
Start: 2021-06-28 | End: 2021-06-30 | Stop reason: HOSPADM

## 2021-06-28 RX ORDER — FENOFIBRATE 160 MG/1
160 TABLET ORAL DAILY
Status: DISCONTINUED | OUTPATIENT
Start: 2021-06-28 | End: 2021-06-30 | Stop reason: HOSPADM

## 2021-06-28 RX ORDER — QUETIAPINE FUMARATE 200 MG/1
200 TABLET, FILM COATED ORAL NIGHTLY
Status: DISCONTINUED | OUTPATIENT
Start: 2021-06-28 | End: 2021-06-30 | Stop reason: HOSPADM

## 2021-06-28 RX ORDER — QUETIAPINE FUMARATE 50 MG/1
50 TABLET, FILM COATED ORAL 2 TIMES DAILY
Status: DISCONTINUED | OUTPATIENT
Start: 2021-06-28 | End: 2021-06-30 | Stop reason: HOSPADM

## 2021-06-28 RX ORDER — MELOXICAM 7.5 MG/1
15 TABLET ORAL DAILY
Status: DISCONTINUED | OUTPATIENT
Start: 2021-06-28 | End: 2021-06-30 | Stop reason: HOSPADM

## 2021-06-28 RX ORDER — DIPHENHYDRAMINE HCL 25 MG
25 TABLET ORAL EVERY 8 HOURS PRN
Status: DISCONTINUED | OUTPATIENT
Start: 2021-06-28 | End: 2021-06-30 | Stop reason: HOSPADM

## 2021-06-28 RX ORDER — TRAZODONE HYDROCHLORIDE 100 MG/1
100 TABLET ORAL NIGHTLY
Status: DISCONTINUED | OUTPATIENT
Start: 2021-06-28 | End: 2021-06-30 | Stop reason: HOSPADM

## 2021-06-28 RX ORDER — QUETIAPINE FUMARATE 50 MG/1
50 TABLET, FILM COATED ORAL 2 TIMES DAILY
Status: ON HOLD | COMMUNITY
End: 2021-06-30 | Stop reason: SDUPTHER

## 2021-06-28 RX ORDER — MELOXICAM 15 MG/1
15 TABLET ORAL DAILY
Status: ON HOLD | COMMUNITY
End: 2021-07-22

## 2021-06-28 RX ORDER — ARIPIPRAZOLE LAUROXIL 1064 MG/3.9ML
1064 INJECTION, SUSPENSION, EXTENDED RELEASE INTRAMUSCULAR ONCE
Status: ON HOLD | COMMUNITY
End: 2021-06-30 | Stop reason: SDUPTHER

## 2021-06-28 RX ORDER — BUPRENORPHINE AND NALOXONE 4; 1 MG/1; MG/1
3 FILM, SOLUBLE BUCCAL; SUBLINGUAL DAILY
Status: DISCONTINUED | OUTPATIENT
Start: 2021-06-28 | End: 2021-06-30 | Stop reason: HOSPADM

## 2021-06-28 RX ORDER — FENOFIBRATE 145 MG/1
145 TABLET, COATED ORAL DAILY
Status: ON HOLD | COMMUNITY
End: 2021-06-30 | Stop reason: HOSPADM

## 2021-06-28 RX ORDER — PANTOPRAZOLE SODIUM 40 MG/1
40 TABLET, DELAYED RELEASE ORAL
Status: DISCONTINUED | OUTPATIENT
Start: 2021-06-29 | End: 2021-06-30 | Stop reason: HOSPADM

## 2021-06-28 RX ORDER — M-VIT,TX,IRON,MINS/CALC/FOLIC 27MG-0.4MG
1 TABLET ORAL DAILY
Status: ON HOLD | COMMUNITY
End: 2021-06-30 | Stop reason: SDUPTHER

## 2021-06-28 RX ORDER — ALBUTEROL SULFATE 90 UG/1
2 AEROSOL, METERED RESPIRATORY (INHALATION) EVERY 6 HOURS PRN
Status: DISCONTINUED | OUTPATIENT
Start: 2021-06-28 | End: 2021-06-30 | Stop reason: HOSPADM

## 2021-06-28 RX ORDER — DULOXETIN HYDROCHLORIDE 30 MG/1
30 CAPSULE, DELAYED RELEASE ORAL DAILY
Status: DISCONTINUED | OUTPATIENT
Start: 2021-06-28 | End: 2021-06-30 | Stop reason: HOSPADM

## 2021-06-28 RX ORDER — TIZANIDINE 4 MG/1
4 TABLET ORAL EVERY 8 HOURS PRN
Status: DISCONTINUED | OUTPATIENT
Start: 2021-06-28 | End: 2021-06-30 | Stop reason: HOSPADM

## 2021-06-28 RX ORDER — BUPRENORPHINE AND NALOXONE 12; 3 MG/1; MG/1
1 FILM, SOLUBLE BUCCAL; SUBLINGUAL DAILY
Status: ON HOLD | COMMUNITY
End: 2021-06-30 | Stop reason: SDUPTHER

## 2021-06-28 RX ADMIN — QUETIAPINE FUMARATE 200 MG: 200 TABLET ORAL at 20:52

## 2021-06-28 RX ADMIN — Medication 1 TABLET: at 12:00

## 2021-06-28 RX ADMIN — TRAZODONE HYDROCHLORIDE 100 MG: 100 TABLET ORAL at 20:52

## 2021-06-28 RX ADMIN — BUPRENORPHINE AND NALOXONE 3 FILM: 4; 1 FILM BUCCAL; SUBLINGUAL at 14:41

## 2021-06-28 RX ADMIN — CLONIDINE HYDROCHLORIDE 0.1 MG: 0.1 TABLET ORAL at 14:28

## 2021-06-28 RX ADMIN — TIZANIDINE 4 MG: 4 TABLET ORAL at 14:28

## 2021-06-28 RX ADMIN — METFORMIN HYDROCHLORIDE 500 MG: 500 TABLET ORAL at 16:15

## 2021-06-28 RX ADMIN — HYDROXYZINE HYDROCHLORIDE 50 MG: 50 TABLET, FILM COATED ORAL at 16:15

## 2021-06-28 RX ADMIN — CLONIDINE HYDROCHLORIDE 0.1 MG: 0.1 TABLET ORAL at 20:52

## 2021-06-28 RX ADMIN — DIPHENHYDRAMINE HCL 25 MG: 25 TABLET ORAL at 19:15

## 2021-06-28 RX ADMIN — MELOXICAM 15 MG: 7.5 TABLET ORAL at 11:59

## 2021-06-28 RX ADMIN — TIZANIDINE 4 MG: 4 TABLET ORAL at 19:17

## 2021-06-28 RX ADMIN — HYDROXYZINE HYDROCHLORIDE 50 MG: 50 TABLET, FILM COATED ORAL at 14:28

## 2021-06-28 RX ADMIN — METFORMIN HYDROCHLORIDE 500 MG: 500 TABLET ORAL at 12:00

## 2021-06-28 RX ADMIN — QUETIAPINE FUMARATE 50 MG: 50 TABLET ORAL at 14:28

## 2021-06-28 RX ADMIN — DULOXETINE HYDROCHLORIDE 30 MG: 30 CAPSULE, DELAYED RELEASE ORAL at 12:00

## 2021-06-28 ASSESSMENT — PAIN SCALES - GENERAL: PAINLEVEL_OUTOF10: 2

## 2021-06-28 NOTE — H&P
Department of Psychiatry  Psychiatric Assessment   Reason for Admission to Psychiatric Unit:  Threat to self requiring 24 hour professional observation  Concerns about patient's safety in the community    CHIEF COMPLAINT:  Suicidal ideation with plan to cut wrists    HISTORY OF PRESENT ILLNESS:      Sweetie Farfan is a 27 y.o. male with a history of schizoaffective disorder bipolar type and polysubstance abuse who presented to the emergency department to the ED for itching and anxiety and then he reported he was suicidal ideation with a plan cut his wrist when he was being discharged. Per the ED social work note:  Patient reports he was living in the [facility] area and recently returned to [place] a few days ago and is not linked and not on medications. Patient denies [facility]H and reports poor sleep and appetite. Patient then denied any [facility] VH to staff and was discharged to rescue. Patient returned an hour and half later and reports rescue sent him here and that they had no beds open and were closing in a few days. Patient reported Jose Miguel Merino was thinking about cutting myself I just need help before I get worse. Patient then reported he graduated [employer] [facility] [place] and has a suboxone script that runs out on Thursday and needs to be linked with someone. Patient reports Dr Timothy Davis was the doctor at [facility] and needs to dose. Patient continues to deny [facility] VH reports poor sleep with 5-6 hours and reports fair appetite. Denies any current AOD use and legal issues. Chiqui Scott states he got into it with his brother when he came back to [place] from [place] last week. He states his depression and anxiety got bad after that. He says he came to the hospital to get back on his medications and feel right. He states he told them in the ED that he was feeling depressed and his anxiety was bad.  He denies telling the ED providers that he was suicidal. However, it was documented that he verbalized suicidal ideation with a linked with [facility] in the past. He reports he graduated from [facility] on Friday and he came back to [facility] and is not linked with anyone  · Suicide attempts: Patient reports he attempted to cut his wrist with when he was a child. · Inpatient psychiatric admissions: Multiple. Was last admitted to [facility] twice in February 2019. Past psychiatric medications includes:   Currently on Belsomra for sleep, Neurontin, Cymbalta, Trazodone, Seroquel, Clonidine for anxiety, and Suboxone.      Abilify, Aristada, Cymbalta, Neurontin, Trazodone, Seroquel, Suboxone, Methadone, Wellbutrin, Zyprexa, Haldol, Geodon, Remeron     Adverse reactions from psychotropic medications:  Haldol- throat swelling  Geodon- anaphylaxis   Allergy to Remeron       Past Medical History:        Diagnosis Date    Anxiety     Arthritis     Asthma     Bipolar I disorder, most recent episode (or current) depressed, unspecified 9/12/2014    Clostridium difficile infection     COPD (chronic obstructive pulmonary disease) ([address]     Depression     Disease of blood and blood forming organ     Eczema     Fracture, metacarpal     R 4th and 5th    Gastric ulcer     Gastritis 06/13/2018    GERD (gastroesophageal reflux disease)     GI bleed     H. 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 COLONOSCOPY      ENDOSCOPY, COLON, DIAGNOSTIC      OTHER SURGICAL HISTORY      lumbar puncture    TX COLONOSCOPY W/BIOPSY SINGLE/MULTIPLE  8/9/2017    COLONOSCOPY WITH BIOPSY performed by Gabrielle Ferrari MD at [facility]    TX EGD TRANSORAL BIOPSY SINGLE/MULTIPLE N/A 3/20/2017    EGD BIOPSY performed by Manuela Dixon MD at [facility]    TX ESOPHAGOGASTRODUODENOSCOPY TRANSORAL DIAGNOSTIC N/A 8/9/2017    EGD ESOPHAGOGASTRODUODENOSCOPY performed by Gabrielle Ferrari MD at [address] N/A 3/20/2017    SIGMOIDOSCOPY DIAGNOSTIC FLEXIBLE performed by Manuela Dixon MD at [address] [facility] [address]  2/4/16    UPPER GASTROINTESTINAL ENDOSCOPY N/A 6/13/2018    GASTRITIS    UPPER GASTROINTESTINAL ENDOSCOPY N/A 9/20/2018    EGD BIOPSY performed by Antione Adkins MD [address]       Medications Prior to Admission:   Medications Prior to Admission: buprenorphine-naloxone (SUBOXONE) 12-3 MG sublingual film, Place 1 Film under the tongue daily. fenofibrate (TRICOR) 145 MG tablet, Take 145 mg by mouth daily  Multiple Vitamins-Minerals (THERAPEUTIC MULTIVITAMIN-MINERALS) tablet, Take 1 tablet by mouth daily  ARIPiprazole Lauroxil (ARISTADA) 1064 MG/3.9ML PRSY injection, Inject 1,064 mg into the muscle once  QUEtiapine (SEROQUEL) 50 MG tablet, Take 50 mg by mouth 2 times daily at 0800 and 1400  metFORMIN (GLUCOPHAGE) 500 MG tablet, Take 500 mg by mouth 2 times daily (with meals)  meloxicam (MOBIC) 15 MG tablet, Take 15 mg by mouth daily  permethrin (ELIMITE) 5 % cream, Apply topically to entire body or affected areas once, leave on for 8 hours, then rinse.   May repeat in 1-2 weeks if symptoms persist.  hydrOXYzine (ATARAX) 25 MG tablet, Take 1 tablet by mouth every 6 hours as needed for Itching  pantoprazole (PROTONIX) 40 MG tablet, Take 1 tablet by mouth every morning (before breakfast)  naloxone 4 MG/0.1ML LIQD nasal spray, 1 spray by Nasal route as needed for Opioid Reversal  tiZANidine (ZANAFLEX) 4 MG tablet, Take 1 tablet by mouth every 8 hours as needed (HEADACHES, BACK PAIN)  DULoxetine (CYMBALTA) 30 MG extended release capsule, Take 30 mg by mouth daily  cloNIDine (CATAPRES) 0.1 MG tablet, Take 1 tablet by mouth 3 times daily  traZODone (DESYREL) 100 MG tablet, Take 100 mg by mouth nightly  Suvorexant (BELSOMRA) 20 MG TABS, Take 1 tablet by mouth nightly. QUEtiapine (SEROQUEL) 200 MG tablet, Take 200 mg by mouth nightly  albuterol sulfate HFA (VENTOLIN HFA) 108 (90 Base) MCG/ACT inhaler, Inhale 2 puffs into the lungs every 6 hours as needed for Wheezing (Patient taking differently: Inhale 2 puffs into the lungs every 6 hours as needed for Wheezing )  [DISCONTINUED] gabapentin (NEURONTIN) 600 MG tablet, Take 1 tablet by mouth 3 times daily for 30 days. [DISCONTINUED] ibuprofen (ADVIL;MOTRIN) 600 MG tablet, Take 600 mg by mouth every 8 hours as needed for Pain  [DISCONTINUED] acetaminophen (TYLENOL) 500 MG tablet, Take 1 tablet by mouth every 4 hours as needed for Pain  [DISCONTINUED] melatonin ER 1 MG TBCR tablet, Take 1 tablet by mouth nightly as needed (Sleep)  [DISCONTINUED] Fluticasone Furoate-Vilanterol (BREO ELLIPTA) 200-25 MCG/INH AEPB, Inhale 1 puff into the lungs daily  [DISCONTINUED] vitamin D (ERGOCALCIFEROL) 25130 units capsule, Take 1 capsule by mouth once a week (Patient taking differently: Take 50,000 Units by mouth once a week Takes every Wed)    Allergies:  Dicyclomine, Famotidine, Geodon [ziprasidone hcl], Haloperidol, Iv dye [iodides], Ibuprofen, Aspirin, Dicyclomine hcl, Flagyl [metronidazole], Iodine, and Mirtazapine    Social History:   BORN [place] IN [address] [facility] [address]  · RESIDENCE:  Currently lives in [facility] with his brother  · LEVEL OF EDUCATION:  GED  · MARITAL STATUS:Single  · CHILDREN: None  · OCCUPATION: On disabilify for mental health and physical issues. Unemployed. In the process of looking for a job. · PATIENT ASSETS: family supportive    DRUG USE HISTORY  Social History     Tobacco Use   Smoking Status Current Every Day Smoker    Packs/day: 1.00    Years: 15.00    Pack years: 15.00    Types: Cigarettes   Smokeless Tobacco Never Used     Social History     Substance and Sexual Activity   Alcohol Use Yes    Comment: social ETOH use     Social History     Substance and Sexual Activity   Drug Use Not Currently    Types: Opiates     Comment: last used pain pill/fentanyl 2/18/21   Recently completed a year long program at [facility]. Denies any recent illicit drug use. LEGAL HISTORY:   HISTORY OF INCARCERATION: no    Family Psychiatric and Medical History:   Brother and father depression  Denies suicides in family  Father alcohol use in family        Problem Relation Age of Onset    Diabetes Father     Alcohol Abuse Father     Depression Father     Arthritis Father     High Blood Pressure Father     Other Father         aneurysm & epilepsy    Migraines Father     Arthritis Mother     Other Mother         aneurysm & epilepsy    Migraines Mother     Diabetes Brother         Aunt and uncles    Depression Brother     Mental Illness Brother     Other Brother         epilepsy    Migraines Brother     Stroke Other         Uncle    Other Brother         murdered Oct 6th, 2014    Colon Cancer Paternal Cousin 43    Other Sister         epilepsy    Migraines Sister          Psychiatric Review of Systems      ·    Obsessions and Compulsions: denies  ·    Elizabeth or Hypomania: yes  ·    Hallucinations: yes  ·    Panic Attacks:  denies   ·    Delusions:  yes  ·    Phobias: denies       Medical Review of Systems:     Constitutional: Negative for appetite change, diaphoresis, fatigue and fever. HENT: Negative for congestion, sore throat and tinnitus. Eyes: Negative for visual disturbance. Respiratory: Negative for cough, shortness of breath and wheezing.     Cardiovascular: Negative for chest pain clinically adequate  Memory: intact  Insight & Judgement: poor        DSM-5 DIAGNOSIS:    Schizoaffective disorder bipolar type  Opiate use disorder, on partial agonist therapy    Patient Active Problem List   Diagnosis    Opioid abuse ([address]    Noncompliance    Rectal bleeding    Smoker    Juvenile rheumatoid arthritis ([address]    SRIRAM (obstructive sleep apnea)    Primary insomnia    Calculus of bile duct with acute on chronic cholecystitis    Mild intermittent asthma without complication    Gastritis    Generalized abdominal pain    Anemia    Elevated liver enzymes    Nausea and vomiting    Opioid type dependence, continuous (HCC)    Abdominal pain    Diarrhea    Irritable bowel syndrome with both constipation and diarrhea    Schizoaffective disorder, bipolar type (HCC)    GI bleed    Schizoaffective disorder (HCC)          Psychosocial and Contextual Factors:   Financial  Occupational  Familial  Living situation  Educational     Past Medical History:   Diagnosis Date    Anxiety     Arthritis     Asthma     Bipolar I disorder, most recent episode (or current) depressed, unspecified 9/12/2014    Clostridium difficile infection     COPD (chronic obstructive pulmonary disease) ([address]     Depression     Disease of blood and blood forming organ     Eczema     Fracture, metacarpal     R 4th and 5th    Gastric ulcer     Gastritis 06/13/2018    GERD (gastroesophageal reflux disease)     GI bleed     H. 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[address]     Substance abuse ([address]     Suicidal ideation     Suicide attempt by hanging ([address]     Tobacco dependence     Ulcerative colitis ([address]     UTI (urinary tract infection)           TREATMENT PLAN  Risk Management:  close watch per standard protocol  Psychotherapy:  participation in milieu and group and individual sessions with Attending Physician,  and Physician Assistant/CNP  Reason for Admission to Psychiatric Unit:  Threat to self  Estimated length of stay: It might take more than 2 midnights to stabilize patient's mood/thoughts and titrate medications to effect. GENERAL PATIENT/FAMILY EDUCATION  Patient will understand basic signs and symptoms, Patient will understand benefits/risks and potential side effects from proposed meds and Patient will understand their role in recovery. Family is  active in patient's care. Patient assets that may be helpful during treatment include: Intent to participate and engage in treatment, sufficient fund of knowledge and intellect to understand and utilize treatments. Risk level: High     Goals:    Reviewed labs  Reviewed EKG  Will obtain records and review them today. Medication adjustment: Resume home medications as prescribed and adjust accordingly. Will hold off on resuming Suboxone as patient was + for opiates. Will not resume Belsomra or Gabapentin at this time as his prescriptions have  per OARRS. Consults: None   Encouraged patient to engage in groups, milieu, and individual therapies offered as part of programing. Behavioral Services  Medicare Certification Upon Admission    I certify that this patient's inpatient psychiatric hospital admission is medically necessary for:   X (1) Treatment which could reasonably be expected to improve this patient's condition,      X (2) Or for diagnostic study;     AND     X (2) The inpatient psychiatric services are provided while the individual is under the care of a physician and are included in the individualized plan of care.     Estimated length of stay/service: Greater than two midnights will be required to reach therapeutic levels of medications and to stabilize mood    Plan for post-hospital care: Follow up with outpatient psychiatric services    Kareme Byers is a 27 y.o. male being evaluated by a Virtual Visit (video visit) encounter to address concerns as mentioned above. A caregiver was present in the room along with the patient. Pursuant to the emergency declaration under the [facility], [address] [place] [address] and the [employer] Act, this Virtual Visit was conducted with patient's (and/or legal guardian's) consent, to reduce the patient's risk of exposure to COVID-19 and provide necessary medical care. Services were provided through a video synchronous discussion virtually to substitute for in-person visit by provider. Patient is present at [facility] in [place] and I am physically present at my home in [facility] [address], [place] on 6/28/2021 at 12:48 PM     An electronic signature was used to authenticate this note. I independently saw and evaluated the patient. I reviewed the midlevel provider's documentation above. Any additional comments or changes to the midlevel provider's documentation are stated below otherwise agree with assessment. The patient was seen in his room. The patient states that he just got into an argument with his family. He states he told them he was feeling depressed. He states he never said he was suicidal and the difficulty was suicidal because he was depressed. The patient is minimizing his symptoms. The patient states he has been compliant with medication including Suboxone. The patient has tested positive for opioids and states he does not know how he tested positive he denies using anything other than Suboxone recently. The patient is denying any auditory visual hallucinations. He denies any psychotic phenomena.   He is medication focused and wants to be back on Suboxone. PLAN  Prior to admission medications including Suboxone and tizanidine has been ordered  Attempt to develop insight  Psycho-education conducted. Supportive Therapy conducted.   Probable discharge is in 2 to 3 days  Follow-up daily while on inpatient unit    Electronically signed by Lyndee Osgood, MD on 6/28/21 at 2:11 PM EDT

## 2021-06-28 NOTE — PROGRESS NOTES
`[facility] [place]  Admission Note     Admission Type:   Admission Type: Voluntary    Reason for admission:  Reason for Admission: Suicidal ideations with a plan to cut self with a knife.  Increased hopelessness and helplessness    PATIENT STRENGTHS:  Strengths: Communication, Social Skills    Patient Strengths and Limitations:  Limitations: Hopeless about future, Difficulty problem solving/relies on others to help solve problems    Addictive Behavior:   Addictive Behavior  In the past 3 months, have you felt or has someone told you that you have a problem with:  : None  Do you have a history of Chemical Use?: No  Do you have a history of Alcohol Use?: No  Do you have a history of Street Drug Abuse?: Yes  Histroy of Prescripton Drug Abuse?: No    Medical Problems:   Past Medical History:   Diagnosis Date    Anxiety     Arthritis     Asthma     Bipolar I disorder, most recent episode (or current) depressed, unspecified 9/12/2014    Clostridium difficile infection     COPD (chronic obstructive pulmonary disease) [address]     Depression     Disease of blood and blood forming organ     Eczema     Fracture, metacarpal     R 4th and 5th    Gastric ulcer     Gastritis 06/13/2018    GERD (gastroesophageal reflux disease)     GI bleed     H. 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[address]     Substance abuse ([address]     Suicidal ideation     Suicide attempt by hanging ([address]     Tobacco dependence     Ulcerative colitis ([address]     UTI (urinary tract infection)        Status EXAM:  Status and Exam  Normal: No  Facial Expression: Sad, Worried  Affect: Congruent, Appropriate  Level of Consciousness: Alert  Mood:Normal: No  Mood: Depressed, Anxious, Helpless  Motor Activity:Normal: No  Motor Activity: Decreased  Interview Behavior: Cooperative  Preception: [place] to Person, Laurie Grime to Time, [place] to Place, [place] to Situation  Attention:Normal: No  Attention: Distractible  Thought Processes: Circumstantial  Thought Content:Normal: No  Thought Content: Preoccupations  Hallucinations: None  Delusions: No  Memory:Normal: No  Memory: Poor Recent  Insight and Judgment: No  Insight and Judgment: Poor Judgment  Present Suicidal Ideation: Yes (Fleeting; no plan. Contracts for safety)  Present Homicidal Ideation: No    Pt admitted with followings belongings:  Dentures: None  Vision - Corrective Lenses: None  Hearing Aid: None  Jewelry: Other (Comment)  Clothing: Footwear, Pants, Shirt, Other (Comment) (see sheet)  Were All Patient Medications Collected?: Not Applicable  Other Valuables: Other (Comment) (see sheet)       Valuables placed in safe in security envelope, number:  [id number]. Patient's home medications were reviewed. Patient oriented to surroundings and program expectations and copy of patient rights given. Received admission packet:  yes. Consents reviewed and signed. Patient verbalizes an understanding. Admit 6/24/2021   1935    Patient admitted to the [facility] room 220 per provider order under voluntary status. Pt changed into hospital attire. Pt scanned with metal detector. Nourishment provided. Recently graduated from [employer] [facility] and came back to [place]. Not linked, only taking Suboxone (filled with [place] in [address] please in am). Living with brother. Denies AOD abuse; urine tox positive for opiates. Signed all consents, cooperative. [employer] contracts for safety. Denies AVH. Wants to be dc'd to tx facility. MD paged for orders. Will continue to monitor patient for safety and behavior.                  Stella Marie RN

## 2021-06-28 NOTE — CARE COORDINATION
Social Service Note: Pt signs a release of information for [facility] [place] [facility] . treatment program. DEZ speaks with Floretta Hammans at [facility] [place] [facility] [place] [facility] who is filling in for Lillian Lucio this week at [phone]  DEZ faxes requested chart information to [phone].

## 2021-06-28 NOTE — GROUP NOTE
Group Therapy Note    Date: 6/28/2021    Group Start Time: 1100  Group End Time: 7321  Group Topic: Cognitive Skills    TIESHA Andrade, CTRS    Pt did not attend 1100 cognitive skills group d/t resting in room despite staff invitation to attend. 1:1 talk time offered as alternative to group session, pt declined.               Signature:  Ace Kessler

## 2021-06-28 NOTE — PROGRESS NOTES
Behavioral Services  Medicare Certification Upon Admission    I certify that this patient's inpatient psychiatric hospital admission is medically necessary for:    [x] (1) Treatment which could reasonably be expected to improve this patient's condition,       [x] (2) Or for diagnostic study;     AND     [x](2) The inpatient psychiatric services are provided while the individual is under the care of a physician and are included in the individualized plan of care.     Estimated length of stay/service 5-9 days    Plan for post-hospital care -outpatient care    Electronically signed by José Manuel Bobo MD on 6/28/2021 at 12:10 PM

## 2021-06-28 NOTE — PLAN OF CARE
Problem: Altered Mood, Depressive Behavior:  Goal: Able to verbalize and/or display a decrease in depressive symptoms  Description: Able to verbalize and/or display a decrease in depressive symptoms  6/28/2021 1118 by Scott Aguero RN  Outcome: Ongoing  Note: Mr. Frances Dean approached this writer inquiring of his scheduled medication. He voiced that once he started them, he hoped to be discharged this coming Wednesday. Mr. Frances Dean is isolative to his bed. He does not attend groups. Mr. Frances Dean denies suicidal ideation and thoughts of harm to self and others. Is polite and appropriate during interactions. No aggression or agitation demonstrated thus far. Safety rounds continued. Problem: Tobacco Use:  Goal: Inpatient tobacco use cessation counseling participation  Description: Inpatient tobacco use cessation counseling participation  6/28/2021 1118 by Scott Aguero RN  Outcome: Ongoing  Note: Mr. Frances Dean utilizes his scheduled nicotine patch as prescribed. He declined tobacco cessation education at this time.

## 2021-06-28 NOTE — CARE COORDINATION
BHI Biopsychosocial Assessment    Current Level of Psychosocial Functioning     Independent   Dependent    Minimal Assist  X    Psychosocial High Risk Factors (check all that apply)    Unable to obtain meds   Chronic illness/pain    Substance abuse X Opiates   Lack of Family Support    Financial stress   Isolation X  Inadequate Community Resources   Suicide attempt(s)  Not taking medications X  Victim of crime   Developmental Delay  Unable to manage personal needs  X  Age 72 or older   Homeless  No transportation   Readmission within 30 days  Unemployment  Traumatic Event    Psychiatric Advanced Directives: none reported     Family to Involve in Treatment: Pt reports his mother and father are supportive and involved in his care. Sexual Orientation:  RANDAL    Patient Strengths: Insurance, SSI income,     Patient Barriers: Presenting on admission with suicidal ideation. Opiate Education Provided :Pt was provided with Opiate addiction and relapse information. Pt reports opiate use and reports that he relapsed on opiates 5 days ago after leaving a treatment program.     CMHC/mental health history: Pt was linked in the past with [facility], but reports non-compliance with treatment. Pt was recently in inpatient treatment at [facility] in [place], [place], he reports that he was there for 6 months and graduated from the program and reports that he recently relapsed on Opiates 4-5 days ago. Pt reports an interest in exploring inpatient Treatment programs     Plan of Care   medication management, group/individual therapies, family meetings, psycho -education, treatment team meetings to assist with stabilization, referral to community resources. Initial Discharge Plan:  Pt reports a plan to go into inpatient Treatment Program at discharge and has been provided with [facility]. with inpatient treatment and sober living facilities on this date.        Clinical Summary:  Michael wall 27 y.o.  American male who presents on admission initially  for itching and anxiety and then he reported he was suicidal with a plan to cut his wrist.   Patient reports he was staying in the [facility] in inpatient Treatment program. and recently returned to [facility] a few days ago and is not linked and not on medications. It was documented in PT chart that PT  then denied any suicidal or homicidal ideation. to staff and was discharged to [facility]. It was documented in ED notes that patient returned an hour and half later and reports that [facility] here and that they had no beds open and were closing in a few days. Patient then reported he graduated [facility] program in [facility] and has a suboxone script that runs out on Thursday and needs to be linked with someone. Pt was linked in the past with [facility], but reports non-compliance with treatment. Pt was recently in inpatient treatment at [facility] in [place], [place], he reports that he was there for 6 months and graduated from the program and reports that he recently relapsed on Opiates 4-5 days ago.   Pt reports an interest in exploring inpatient Treatment programs

## 2021-06-28 NOTE — PLAN OF CARE
[facility]  Initial Interdisciplinary Treatment Plan NO      Original treatment plan Date & Time: 6/28/2021 0729    Admission Type:  Admission Type: Voluntary    Reason for admission:   Reason for Admission: Suicidal ideations with a plan to cut self with a knife.  Increased hopelessness and helplessness    Estimated Length of Stay:  5-7days  Estimated Discharge Date: to be determined by physician    PATIENT STRENGTHS:  Patient Strengths:Strengths: Communication, Social Skills  Patient Strengths and Limitations:Limitations: Hopeless about future, Difficulty problem solving/relies on others to help solve problems  Addictive Behavior: Addictive Behavior  In the past 3 months, have you felt or has someone told you that you have a problem with:  : None  Do you have a history of Chemical Use?: No  Do you have a history of Alcohol Use?: No  Do you have a history of Street Drug Abuse?: Yes  Histroy of Prescripton Drug Abuse?: No  Medical Problems:  Past Medical History:   Diagnosis Date    Anxiety     Arthritis     Asthma     Bipolar I disorder, most recent episode (or current) depressed, unspecified 9/12/2014    Clostridium difficile infection     COPD (chronic obstructive pulmonary disease) ([address]     Depression     Disease of blood and blood forming organ     Eczema     Fracture, metacarpal     R 4th and 5th    Gastric ulcer     Gastritis 06/13/2018    GERD (gastroesophageal reflux disease)     GI bleed     H. 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[address]     Substance abuse ([address]     Suicidal ideation     Suicide attempt by hanging ([address]     Tobacco dependence     Ulcerative colitis ([address]     UTI (urinary tract infection)      Status EXAM:Status and Exam  Normal: No  Facial Expression: Sad, Worried  Affect: Congruent, Appropriate  Level of Consciousness: Alert  Mood:Normal: No  Mood: Depressed, Anxious, Helpless  Motor Activity:Normal: No  Motor Activity: Decreased  Interview Behavior: Cooperative  Preception: [place] to Person, Yeison Jacobson to Time, [place] to Place, [place] to Situation  Attention:Normal: No  Attention: Distractible  Thought Processes: Circumstantial  Thought Content:Normal: No  Thought Content: Preoccupations  Hallucinations: None  Delusions: No  Memory:Normal: No  Memory: Poor Recent  Insight and Judgment: No  Insight and Judgment: Poor Judgment  Present Suicidal Ideation: Yes (Fleeting; no plan.  Contracts for safety)  Present Homicidal Ideation: No    EDUCATION:   Learner Progress Toward Treatment Goals: reviewed group plans and strategies for care    Method:group therapy, medication compliance, individualized assessments and care planning    Outcome: needs reinforcement    PATIENT GOALS: to be discussed with patient within 72 hours    PLAN/TREATMENT RECOMMENDATIONS:     continue group therapy , medications compliance, goal setting, individualized assessments and care, continue to monitor pt on unit      SHORT-TERM GOALS:   Time frame for Short-Term Goals: 5-7 days    LONG-TERM GOALS:  Time frame for Long-Term Goals: 6 months  Members Present in Team Meeting: See Signature Sheet    BRIANNA Vazquez

## 2021-06-28 NOTE — PROGRESS NOTES
Pharmacy Medication History Note      List of current medications patient is taking is complete. Source of information: [facility] ([address] [place] [address]), [employer], [place] [address] [facility], [address]    Changes made to medication list:  Medications removed (include reason, ex. therapy complete or physician discontinued, noncompliance):  · Acetaminophen (list clean up), Breo Elipta (list clean up), Gabapentin (list clean up), Ibuprofen (list clean up), Melatonin (list clean up)    Medications added/doses adjusted:  · Added Aristada 1064 mg once  · Added Suboxone 12-3 mg SL film daily - LF 6/24/21 (7 day supply)  · Added Fenofibrate 145 mg daily  · Added Metformin 500 mg twice daily  · Adjusted Meloxicam to 15 mg daily  · Added multivitamin daily  · Added Quetiapine 50 mg twice daily, at 8 am and 2pm    Other notes (ex. Recent course of antibiotics, Coumadin dosing):  · Patient filled [place] Lathe and Aristada 1064 mg on 6/14/21 at [facility]. Left message at [facility] in [address] [place] [address] to confirm if injection was administered. · Patient was seen in the ER on 6/26/21 for scabies and provided with a prescription for Permethrin, unsure if patient was treated. · Patient was also on Gabapentin 600 mg three times daily but last filled 15 day supply on 5/18/21. Please let me know if you have any questions about this encounter. Thank you!     Electronically signed by Gali Rodriguez, [facility] on 6/28/2021 at 9:07 AM

## 2021-06-28 NOTE — PROGRESS NOTES
Received return call from [facility]. Patient received Aristada 1064 mg and Aristada Initio on 06/17/21.

## 2021-06-29 PROCEDURE — 1240000000 HC EMOTIONAL WELLNESS R&B

## 2021-06-29 PROCEDURE — APPSS15 APP SPLIT SHARED TIME 0-15 MINUTES: Performed by: NURSE PRACTITIONER

## 2021-06-29 PROCEDURE — 99232 SBSQ HOSP IP/OBS MODERATE 35: CPT | Performed by: PSYCHIATRY & NEUROLOGY

## 2021-06-29 PROCEDURE — 6370000000 HC RX 637 (ALT 250 FOR IP): Performed by: PHYSICIAN ASSISTANT

## 2021-06-29 PROCEDURE — 6370000000 HC RX 637 (ALT 250 FOR IP): Performed by: PSYCHIATRY & NEUROLOGY

## 2021-06-29 RX ADMIN — TIZANIDINE 4 MG: 4 TABLET ORAL at 08:11

## 2021-06-29 RX ADMIN — OLANZAPINE 5 MG: 5 TABLET, FILM COATED ORAL at 18:47

## 2021-06-29 RX ADMIN — CLONIDINE HYDROCHLORIDE 0.1 MG: 0.1 TABLET ORAL at 08:03

## 2021-06-29 RX ADMIN — BUPRENORPHINE AND NALOXONE 3 FILM: 4; 1 FILM BUCCAL; SUBLINGUAL at 08:02

## 2021-06-29 RX ADMIN — QUETIAPINE FUMARATE 50 MG: 50 TABLET ORAL at 08:03

## 2021-06-29 RX ADMIN — CLONIDINE HYDROCHLORIDE 0.1 MG: 0.1 TABLET ORAL at 14:57

## 2021-06-29 RX ADMIN — CLONIDINE HYDROCHLORIDE 0.1 MG: 0.1 TABLET ORAL at 19:58

## 2021-06-29 RX ADMIN — METFORMIN HYDROCHLORIDE 500 MG: 500 TABLET ORAL at 08:02

## 2021-06-29 RX ADMIN — TIZANIDINE 4 MG: 4 TABLET ORAL at 14:57

## 2021-06-29 RX ADMIN — DIPHENHYDRAMINE HCL 25 MG: 25 TABLET ORAL at 08:08

## 2021-06-29 RX ADMIN — QUETIAPINE FUMARATE 50 MG: 50 TABLET ORAL at 14:57

## 2021-06-29 RX ADMIN — QUETIAPINE FUMARATE 200 MG: 200 TABLET ORAL at 19:58

## 2021-06-29 RX ADMIN — PANTOPRAZOLE SODIUM 40 MG: 40 TABLET, DELAYED RELEASE ORAL at 08:03

## 2021-06-29 RX ADMIN — DIPHENHYDRAMINE HCL 25 MG: 25 TABLET ORAL at 20:51

## 2021-06-29 RX ADMIN — TRAZODONE HYDROCHLORIDE 100 MG: 100 TABLET ORAL at 19:58

## 2021-06-29 ASSESSMENT — PAIN SCALES - GENERAL: PAINLEVEL_OUTOF10: 2

## 2021-06-29 NOTE — PROGRESS NOTES
Daily Progress Note  6/29/2021    Patient Name: Katie Givens    CHIEF COMPLAINT: Suicidal ideation with plan to cut wrists         SUBJECTIVE:      Patient is seen today for a follow up assessment. Staff reports that patient was medication focused this morning, they report that he refused to take a handful of medications including his Cymbalta. He was interviewed today bedside, when asked about his medication refusal he denies giving back his psych meds. We discussed each medication that he returned including the Cymbalta and he states \"I will make sure I take it tomorrow\". He reports that his mood is improving as well as suicidal ideation. We did discuss the risks of decompensation in mood with refusal of antidepressant. He did verbalize understanding and again agreed to remain compliant with medications tomorrow. He reports that he is hoping to discharge to [facility]'s program though has concern because they are not returning his call. He reports worse case scenario once stability of symptoms are achieved he could return to his family's home if he has to. Appetite:  [x] Adequate/Unchanged  [] Increased  [] Decreased      Sleep:       [x] Adequate/Unchanged  [] Fair  [] Poor      Group Attendance on Unit:   [] Yes  [] Selectively    [x] No    Medication Side Effects: Denies         Mental Status Exam  Level of consciousness: Alert and awake   Appearance: Appropriate attire for setting, resting in bed, with fair  grooming and hygiene   Behavior/Motor: Approachable, no psychomotor abnormalities   Attitude toward examiner: Cooperative, attentive, good eye contact  Speech: Normal rate, volume and tone  Mood: Depressed  Affect: Mood congruent, somewhat superficial  Thought processes: Linear and organized  Thought content: Focused on discharge plan/rehabilitation, Denies homicidal ideation  Suicidal Ideation: Reports improvement in suicidal ideations, contracts for safety on the unit.    Delusions: No evidence of delusions. Perceptual Disturbance: Denies, patient does not appear to be responding to internal stimuli. Cognition: Oriented to self, location, time, and situation  Memory: intact  Insight: poor   Judgement: poor       Data   height is 5' 9\" (1.753 m) and weight is 200 lb (90.7 kg). His oral temperature is 97.8 °F (36.6 °C). His blood pressure is 102/77 and his pulse is 90. His respiration is 14 and oxygen saturation is 95%.    Labs:   Admission on 06/27/2021   Component Date Value Ref Range Status    WBC 06/27/2021 7.2  3.5 - 11.0 k/uL Final    RBC 06/27/2021 4.53  4.5 - 5.9 m/uL Final    Hemoglobin 06/27/2021 13.6  13.5 - 17.5 g/dL Final    Hematocrit 06/27/2021 40.3* 41 - 53 % Final    MCV 06/27/2021 88.9  80 - 100 fL Final    MCH 06/27/2021 30.0  26 - 34 pg Final    MCHC 06/27/2021 33.7  31 - 37 g/dL Final    RDW 06/27/2021 13.6  11.5 - 14.9 % Final    Platelets 53/58/4470 250  150 - 450 k/uL Final    MPV 06/27/2021 7.8  6.0 - 12.0 fL Final    NRBC Automated 06/27/2021 NOT REPORTED  per 100 WBC Final    Differential Type 06/27/2021 NOT REPORTED   Final    Immature Granulocytes 06/27/2021 NOT REPORTED  0 % Final    Absolute Immature Granulocyte 06/27/2021 NOT REPORTED  0.00 - 0.30 k/uL Final    WBC Morphology 06/27/2021 NOT REPORTED   Final    RBC Morphology 06/27/2021 NOT REPORTED   Final    Platelet Estimate 28/34/1106 NOT REPORTED   Final    Seg Neutrophils 06/27/2021 45  36 - 66 % Final    Lymphocytes 06/27/2021 45* 24 - 44 % Final    Monocytes 06/27/2021 6  1 - 7 % Final    Eosinophils % 06/27/2021 3  0 - 4 % Final    Basophils 06/27/2021 1  0 - 2 % Final    Segs Absolute 06/27/2021 3.20  1.3 - 9.1 k/uL Final    Absolute Lymph # 06/27/2021 3.30  1.0 - 4.8 k/uL Final    Absolute Mono # 06/27/2021 0.40  0.1 - 1.3 k/uL Final    Absolute Eos # 06/27/2021 0.20  0.0 - 0.4 k/uL Final    Basophils Absolute 06/27/2021 0.10  0.0 - 0.2 k/uL Final    Glucose 06/27/2021 109* 70 - 99 mg/dL Final    BUN 06/27/2021 12  6 - 20 mg/dL Final    CREATININE 06/27/2021 0.81  0.70 - 1.20 mg/dL Final    Bun/Cre Ratio 06/27/2021 NOT REPORTED  9 - 20 Final    Calcium 06/27/2021 8.6  8.6 - 10.4 mg/dL Final    Sodium 06/27/2021 136  135 - 144 mmol/L Final    Potassium 06/27/2021 3.7  3.7 - 5.3 mmol/L Final    Chloride 06/27/2021 99  98 - 107 mmol/L Final    CO2 06/27/2021 28  20 - 31 mmol/L Final    Anion Gap 06/27/2021 9  9 - 17 mmol/L Final    Alkaline Phosphatase 06/27/2021 72  40 - 129 U/L Final    ALT 06/27/2021 <5* 5 - 41 U/L Final    AST 06/27/2021 <5  <40 U/L Final    Total Bilirubin 06/27/2021 <0.15* 0.3 - 1.2 mg/dL Final    Total Protein 06/27/2021 7.0  6.4 - 8.3 g/dL Final    Albumin 06/27/2021 4.0  3.5 - 5.2 g/dL Final    Albumin/Globulin Ratio 06/27/2021 NOT REPORTED  1.0 - 2.5 Final    GFR Non- 06/27/2021 >60  >60 mL/min Final    GFR  06/27/2021 >60  >60 mL/min Final    GFR Comment 06/27/2021        Final    Comment: Average GFR for 30-36 years old:   80 mL/min/1.73sq m  Chronic Kidney Disease:   <60 mL/min/1.73sq m  Kidney failure:   <15 mL/min/1.73sq m              eGFR calculated using average adult body mass. Additional eGFR calculator available at:        [email or web address]            GFR Staging 06/27/2021 NOT REPORTED   Final    Color, UA 06/27/2021 YELLOW  YELLOW Final    Turbidity UA 06/27/2021 CLEAR  CLEAR Final    Glucose, Ur 06/27/2021 NEGATIVE  NEGATIVE Final    Bilirubin Urine 06/27/2021 Presumptive positive. Unable to confirm due to unavailability of reagent. * NEGATIVE Corrected    CORRECTED ON 06/27 AT 1644: PREVIOUSLY REPORTED AS SMALL    Ketones, Urine 06/27/2021 NEGATIVE  NEGATIVE Final    Specific Gravity, UA 06/27/2021 1.036* 1.000 - 1.030 Final    Urine Hgb 06/27/2021 NEGATIVE  NEGATIVE Final    pH, UA 06/27/2021 6.0  5.0 - 8.0 Final    Protein, UA 06/27/2021 NEGATIVE  NEGATIVE  Methadone Screen, Urine 06/27/2021 NEGATIVE  NEGATIVE Final    Comment:       (Positive cutoff 300 ng/mL)                  Opiates, Urine 06/27/2021 POSITIVE* NEGATIVE Final    Comment:       (Positive cutoff 300 ng/mL)                  Phencyclidine, Urine 06/27/2021 NEGATIVE  NEGATIVE Final    Comment:       (Positive cutoff 25 ng/mL)                  Propoxyphene, Urine 06/27/2021 NOT REPORTED  NEGATIVE Final    Cannabinoid Scrn, Ur 06/27/2021 NEGATIVE  NEGATIVE Final    Comment:       (Positive cutoff 50 ng/mL)                  Oxycodone Screen, Ur 06/27/2021 NEGATIVE  NEGATIVE Final    Comment:       (Positive cutoff 100 ng/mL)                  Methamphetamine, Urine 06/27/2021 NOT REPORTED  NEGATIVE Final    Tricyclic Antidepressants, Urine 06/27/2021 NOT REPORTED  NEGATIVE Final    MDMA, Urine 06/27/2021 NOT REPORTED  NEGATIVE Final    Buprenorphine Urine 06/27/2021 NOT REPORTED  NEGATIVE Final    Test Information 06/27/2021 Assay provides medical screening only. The absence of expected drug(s) and/or metabolite(s) may indicate diluted or adulterated urine, limitations of testing or timing of collection. Final    Comment: Testing for legal purposes should be confirmed by another method. To request confirmation   of test result, please call the lab within 7 days of sample submission.       - 06/27/2021        Final    WBC, UA 06/27/2021 2 TO 5  /HPF Final    RBC, UA 06/27/2021 0 TO 2  /HPF Final    Casts UA 06/27/2021 NOT REPORTED  /LPF Final    Crystals, UA 06/27/2021 MODERATE* None /HPF Final    Crystals, UA 06/27/2021 CALCIUM OXALATE* None /HPF Final    Epithelial Cells UA 06/27/2021 2 TO 5  /HPF Final    Renal Epithelial, UA 06/27/2021 NOT REPORTED  0 /HPF Final    Bacteria, UA 06/27/2021 FEW* None Final    Mucus, UA 06/27/2021 NOT REPORTED  None Final    Trichomonas, UA 06/27/2021 NOT REPORTED  None Final    Amorphous, UA 06/27/2021 NOT REPORTED  None Final    Other Observations  06/27/2021 NOT REPORTED  NOT REQ. Final    Yeast, UA 06/27/2021 NOT REPORTED  None Final         Reviewed patient's current plan of care and vital signs with nursing staff. Labs reviewed: [x] Yes    Medications  Current Facility-Administered Medications: albuterol sulfate  (90 Base) MCG/ACT inhaler 2 puff, 2 puff, Inhalation, Q6H PRN  cloNIDine (CATAPRES) tablet 0.1 mg, 0.1 mg, Oral, TID  DULoxetine (CYMBALTA) extended release capsule 30 mg, 30 mg, Oral, Daily  fenofibrate (TRIGLIDE) tablet 160 mg, 160 mg, Oral, Daily  meloxicam (MOBIC) tablet 15 mg, 15 mg, Oral, Daily  metFORMIN (GLUCOPHAGE) tablet 500 mg, 500 mg, Oral, BID WC  therapeutic multivitamin-minerals 1 tablet, 1 tablet, Oral, Daily  pantoprazole (PROTONIX) tablet 40 mg, 40 mg, Oral, QAM AC  QUEtiapine (SEROQUEL) tablet 200 mg, 200 mg, Oral, Nightly  QUEtiapine (SEROQUEL) tablet 50 mg, 50 mg, Oral, BID- 8&2  traZODone (DESYREL) tablet 100 mg, 100 mg, Oral, Nightly  buprenorphine-naloxone (SUBOXONE) 4-1 MG SL film 3 Film, 3 Film, Sublingual, Daily  tiZANidine (ZANAFLEX) tablet 4 mg, 4 mg, Oral, Q8H PRN  diphenhydrAMINE (BENADRYL) tablet 25 mg, 25 mg, Oral, Q8H PRN  acetaminophen (TYLENOL) tablet 650 mg, 650 mg, Oral, Q4H PRN  aluminum & magnesium hydroxide-simethicone (MAALOX) 200-200-20 MG/5ML suspension 30 mL, 30 mL, Oral, Q6H PRN  polyethylene glycol (GLYCOLAX) packet 17 g, 17 g, Oral, Daily PRN  OLANZapine (ZYPREXA) injection 5 mg, 5 mg, Intramuscular, Q6H PRN **AND** sterile water injection 2.1 mL, 2.1 mL, Intramuscular, Once  OLANZapine (ZYPREXA) tablet 5 mg, 5 mg, Oral, Q6H PRN  nicotine (NICODERM CQ) 14 MG/24HR 1 patch, 1 patch, Transdermal, Daily    ASSESSMENT  Schizoaffective disorder, bipolar type (HCC)         PLAN  Patient symptoms are: Minimally improved  Continue current medication regimen. Encourage oral medication compliance  Monitor need and frequency of PRN medications.   Encourage participation in groups and milieu. Attempt to develop insight. Psycho-education conducted. Supportive Therapy conducted. Probable discharge is per attending physician, possible discharge to [facility] rehabilitation program.  He would benefit from a rehabilitation program versus returning home which increases his risk of decompensation. Follow-up daily while inpatient. Patient continues to be monitored in the inpatient psychiatric facility at [facility] for safety and stabilization. Patient continues to need, on a daily basis, active treatment furnished directly by or requiring the supervision of inpatient psychiatric personnel. Electronically signed by MASSIEL Ceballos CNP on 6/29/2021 at 3:59 PM    **This report has been created using voice recognition software. It may contain minor errors which are inherent in voice recognition technology. **    I independently saw and evaluated the patient. I reviewed the midlevel provider's documentation above. Any additional comments or changes to the midlevel provider's documentation are stated below otherwise agree with assessment. Patient continues to show medication seeking behavior. He wanted to have an injection of Benadryl if he could get something else. The patient believes he is having an allergic reaction though he shows no signs of it. The patient was reassured. No changes have been made to his medications. Noted that the patient has refused a number of his medications this morning including the antidepressant. PLAN  Medications as noted above  Attempt to develop insight  Psycho-education conducted. Supportive Therapy conducted.   Probable discharge is 1 day  Follow-up daily while on inpatient unit    Electronically signed by Lucas Benítez MD on 6/29/21 at 5:55 PM EDT

## 2021-06-29 NOTE — CARE COORDINATION
SOCIAL SERVICE NOTE: Pt requested that PT application and chart information be faxed to [employer]. SW faxes information to [employer] on this date.

## 2021-06-29 NOTE — GROUP NOTE
Group Therapy Note    Date: 6/29/2021    Group Start Time: 1000  Group End Time: 1030  Group Topic: Psychotherapy    TIESHA BRADLEYI ANTHONY Wall        Group Therapy Note             Patient refused to attend psychotherapy group after encouragement from staff. 1:1 talk time offered but refused. Signature:   Zach Wall

## 2021-06-29 NOTE — BH NOTE
After reviewing Mr. Benji Issaffing medication with him and opening the packaging, he picked out the pills he wanted to take, and then refused his scheduled Cymbalta, Fenofibrate, Meloxicam and multi vitamin. Mr. Chance King noted he had requested something stronger than Benadryl for itching, \"maybe a shot of something\". He voiced \"Maybe Atarax or something\". Writer reminded him that he had requested and taken PRN Atarax yesterday, and he reported it was unhelpful which is why he had requested Benadryl. He complained that he \"might be itchy\", and that he was \"scratching myself during the night, making myself bleed\". Writer asked to assess these areas, and Mr. Chance King raised his sleeve and did not find anything, and then pulled aside the neckline of his shirt to demonstrate a pin head sized old scab with no observable scratches. He voiced to this writer that he was making his ankles bleed during the night from scratching. Again, writer asked to assess this area. He went to his room and removed his socks. He pointed to an area on the front of his right ankle. Two tiny old scabs were present, with the surrounding skin appearing healthy and within normal limits. As he was pointing at them, he dragged his fingernail overtop, leaving an ashy line following. Writer was unable to find any areas of irritation or evidence of \"scratching\". Mr. Chance King reported \"it must have returned to normal while I was sleeping\". Mr. Chance King continued to requested the Benadryl despite him telling this nurse it was unhelpful. He then requested his as needed Zanaflex. He asked what else he could have, and followed up with \"Did they give me anything for nausea? \". He did not elaborate when writer told him no.

## 2021-06-29 NOTE — PLAN OF CARE
Problem: Altered Mood, Depressive Behavior:  Goal: Able to verbalize and/or display a decrease in depressive symptoms  Description: Able to verbalize and/or display a decrease in depressive symptoms  6/28/2021 2157 by Radames Celis  Outcome: Ongoing  Note: Patient denies suicidal ideation at this time. Flat. Out in the milieu, social with select peers. Cooperative. Compliant with all prescribed medications during this shift. Safety checks maintained q15min and irregular rounding. Problem: Altered Mood, Depressive Behavior:  Goal: Absence of self-harm  Description: Absence of self-harm  6/28/2021 2157 by Radames Celis  Outcome: Ongoing  Note: Patient remains free from self harm at this time. Pt denies thoughts of self harm and is agreeable to seeking out should thoughts of self harm arise. Safe environment maintained. Q15 minute checks for safety cont per unit policy. Will cont to monitor for safety and provides support and reassurance as needed.

## 2021-06-29 NOTE — BH NOTE
Pt refused his scheduled Metformin and reported it makes him \"sick to my stomach\". He asked what medication he could have. He requested his PRN Zanaflex and was told he took it 2.5 hours ago.

## 2021-06-29 NOTE — PLAN OF CARE
Problem: Altered Mood, Depressive Behavior:  Goal: Able to verbalize and/or display a decrease in depressive symptoms  Description: Able to verbalize and/or display a decrease in depressive symptoms  6/29/2021 0921 by Laureano Ryan RN  Outcome: Ongoing  Note: Mr. Ceferino Soria denies suicidal ideation and thoughts of harm to self and others. He denies auditory and visual hallucinations. Mr. Ceferino Soria denies feeling depressed. He is focused on his medication and makes requests for his as needed medication. He refused some of his morning scheduled medication including cymbalta. He is isolative to his room. Safety rounds continued. Problem: Tobacco Use:  Goal: Inpatient tobacco use cessation counseling participation  Description: Inpatient tobacco use cessation counseling participation  Outcome: Ongoing  Note: Mr. Ceferino Soria utilizes his nicotine patch as scheduled. He declines tobacco cessation education at this time.

## 2021-06-29 NOTE — GROUP NOTE
Group Therapy Note    Date: 6/29/2021    Group Start Time: 1100  Group End Time: 1130  Group Topic: Cognitive Skills    STCZ CHRIS Persaud, CTRS    Pt did not attend Recreational therapy group at 1100 d/t resting in room despite staff invitation to attend. 1:1 talk time offered as alternative to group session, pt declined.           Signature:  Eddie Reed

## 2021-06-29 NOTE — PLAN OF CARE
[facility]  Day 3 Interdisciplinary Treatment Plan NOTE    Review Date & Time: 6/29/2021  1313    Admission Type:   Admission Type: Voluntary    Reason for admission:  Reason for Admission: Suicidal ideations with a plan to cut self with a knife.  Increased hopelessness and helplessness  Estimated Length of Stay: 5-7 days  Estimated Discharge Date Update: to be determined by physician    PATIENT STRENGTHS:  Patient Strengths Strengths: Communication, Social Skills  Patient Strengths and Limitations:Limitations: Hopeless about future, Difficulty problem solving/relies on others to help solve problems  Addictive Behavior:Addictive Behavior  In the past 3 months, have you felt or has someone told you that you have a problem with:  : None  Do you have a history of Chemical Use?: No  Do you have a history of Alcohol Use?: No  Do you have a history of Street Drug Abuse?: Yes  Histroy of Prescripton Drug Abuse?: No  Medical Problems:  Past Medical History:   Diagnosis Date    Anxiety     Arthritis     Asthma     Bipolar I disorder, most recent episode (or current) depressed, unspecified 9/12/2014    Clostridium difficile infection     COPD (chronic obstructive pulmonary disease) ([address]     Depression     Disease of blood and blood forming organ     Eczema     Fracture, metacarpal     R 4th and 5th    Gastric ulcer     Gastritis 06/13/2018    GERD (gastroesophageal reflux disease)     GI bleed     H. 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[address]     Substance abuse ([address]     Suicidal ideation     Suicide attempt by hanging ([address]     Tobacco dependence     Ulcerative colitis ([address]     UTI (urinary tract infection)        Risk:  Fall RiskTotal: 73  Ross Scale Ross Scale Score: 22  BVC Total: 0  Change in scores no Changes to plan of Care no    Status EXAM:   Status and Exam  Normal: No  Facial Expression: Flat  Affect: Appropriate  Level of Consciousness: Alert  Mood:Normal: No  Mood: Depressed, Anxious  Motor Activity:Normal: Yes  Motor Activity: Decreased  Interview Behavior: Cooperative  Preception: [place] to Person, [place] to Time, [place] to Place, [place] to Situation  Attention:Normal: No  Attention: Distractible, Unable to Concentrate  Thought Processes: Circumstantial  Thought Content:Normal: No  Thought Content: Preoccupations  Hallucinations: None  Delusions: No  Memory:Normal: No  Memory: Poor Recent, Poor Remote  Insight and Judgment: No  Insight and Judgment: Poor Judgment, Poor Insight, Unmotivated  Present Suicidal Ideation: No  Present Homicidal Ideation: No    Daily Assessment Last Entry:   Daily Sleep (WDL): Within Defined Limits         Patient Currently in Pain: Denies  Daily Nutrition (WDL): Within Defined Limits    Patient Monitoring:  Frequency of Checks: 4 times per hour, close    Psychiatric Symptoms:   Depression Symptoms  Depression Symptoms: Loss of interest, Change in energy level  Anxiety Symptoms  Anxiety Symptoms: Generalized  Elizabeth Symptoms  Elizabeth Symptoms: No problems reported or observed. Psychosis Symptoms  Delusion Type: No problems reported or observed. Suicide Risk CSSR-S:  1) Within the past month, have you wished you were dead or wished you could go to sleep and not wake up? : Yes  2) Have you actually had any thoughts of killing yourself? : Yes  3) Have you been thinking about how you might kill yourself? : Yes  5) Have you started to work out or worked out the details of how to kill yourself?  Do you intend to carry out this plan? : No  6) Have you ever done anything, started to do anything, or prepared to do anything to end your life?: Yes  Change in Result no Change in Plan of care no      EDUCATION: EDUCATION:   Learner Progress Toward Treatment Goals: Reviewed results and recommendations of this team, Reviewed group plan and strategies, Reviewed signs, symptoms and risk of self harm and violent behavior, Reviewed goals and plan of care    Method:small group, individual verbal education    Outcome:verbalized by patient, but needs reinforcement to obtain goals    PATIENT GOALS:  Short term: pt refuses to attend tx planning to develop goals   Long term: pt refuses to attend tx planning to develop goals    PLAN/TREATMENT RECOMMENDATIONS UPDATE: continue with group therapies, increased socialization, continue planning for after discharge goals, continue with medication compliance    SHORT-TERM GOALS UPDATE:   Time frame for Short-Term Goals: 5-7 days    LONG-TERM GOALS UPDATE:   Time frame for Long-Term Goals: 6 months  Members Present in Team Meeting: See Signature Sheet    BRIANNA Giron Evan

## 2021-06-30 VITALS
DIASTOLIC BLOOD PRESSURE: 74 MMHG | HEART RATE: 100 BPM | HEIGHT: 69 IN | WEIGHT: 200 LBS | OXYGEN SATURATION: 95 % | BODY MASS INDEX: 29.62 KG/M2 | SYSTOLIC BLOOD PRESSURE: 103 MMHG | TEMPERATURE: 98 F | RESPIRATION RATE: 14 BRPM

## 2021-06-30 PROCEDURE — 6370000000 HC RX 637 (ALT 250 FOR IP): Performed by: PSYCHIATRY & NEUROLOGY

## 2021-06-30 PROCEDURE — 99238 HOSP IP/OBS DSCHRG MGMT 30/<: CPT | Performed by: PSYCHIATRY & NEUROLOGY

## 2021-06-30 PROCEDURE — 6370000000 HC RX 637 (ALT 250 FOR IP): Performed by: PHYSICIAN ASSISTANT

## 2021-06-30 RX ORDER — M-VIT,TX,IRON,MINS/CALC/FOLIC 27MG-0.4MG
1 TABLET ORAL DAILY
Qty: 30 TABLET | Refills: 0 | Status: ON HOLD | OUTPATIENT
Start: 2021-06-30 | End: 2021-07-26 | Stop reason: SDUPTHER

## 2021-06-30 RX ORDER — DULOXETIN HYDROCHLORIDE 30 MG/1
30 CAPSULE, DELAYED RELEASE ORAL DAILY
Qty: 30 CAPSULE | Refills: 3 | Status: ON HOLD | OUTPATIENT
Start: 2021-06-30 | End: 2021-07-26 | Stop reason: SDUPTHER

## 2021-06-30 RX ORDER — TRAZODONE HYDROCHLORIDE 100 MG/1
100 TABLET ORAL NIGHTLY
Qty: 30 TABLET | Refills: 0 | Status: ON HOLD | OUTPATIENT
Start: 2021-06-30 | End: 2021-07-26 | Stop reason: HOSPADM

## 2021-06-30 RX ORDER — CLONIDINE HYDROCHLORIDE 0.1 MG/1
0.1 TABLET ORAL 3 TIMES DAILY
Qty: 90 TABLET | Refills: 1 | Status: ON HOLD | OUTPATIENT
Start: 2021-06-30 | End: 2021-07-26 | Stop reason: HOSPADM

## 2021-06-30 RX ORDER — ARIPIPRAZOLE LAUROXIL 1064 MG/3.9ML
1064 INJECTION, SUSPENSION, EXTENDED RELEASE INTRAMUSCULAR
Qty: 3.9 ML | Refills: 0 | Status: ON HOLD | OUTPATIENT
Start: 2021-08-17 | End: 2021-07-26 | Stop reason: HOSPADM

## 2021-06-30 RX ORDER — QUETIAPINE FUMARATE 50 MG/1
50 TABLET, FILM COATED ORAL 2 TIMES DAILY
Qty: 60 TABLET | Refills: 3 | Status: ON HOLD | OUTPATIENT
Start: 2021-06-30 | End: 2021-07-26 | Stop reason: HOSPADM

## 2021-06-30 RX ORDER — FENOFIBRATE 160 MG/1
160 TABLET ORAL DAILY
Qty: 30 TABLET | Refills: 3 | Status: ON HOLD | OUTPATIENT
Start: 2021-07-01 | End: 2021-07-26 | Stop reason: SDUPTHER

## 2021-06-30 RX ORDER — QUETIAPINE FUMARATE 200 MG/1
200 TABLET, FILM COATED ORAL NIGHTLY
Qty: 30 TABLET | Refills: 3 | Status: ON HOLD | OUTPATIENT
Start: 2021-06-30 | End: 2021-07-26 | Stop reason: HOSPADM

## 2021-06-30 RX ORDER — NALOXONE HYDROCHLORIDE 4 MG/.1ML
1 SPRAY NASAL PRN
Qty: 1 EACH | Refills: 1 | Status: ON HOLD | OUTPATIENT
Start: 2021-06-30 | End: 2021-07-26 | Stop reason: HOSPADM

## 2021-06-30 RX ORDER — BUPRENORPHINE AND NALOXONE 12; 3 MG/1; MG/1
1 FILM, SOLUBLE BUCCAL; SUBLINGUAL DAILY
Qty: 15 FILM | Refills: 0 | Status: SHIPPED | OUTPATIENT
Start: 2021-06-30 | End: 2021-07-15

## 2021-06-30 RX ORDER — TIZANIDINE 4 MG/1
4 TABLET ORAL EVERY 8 HOURS PRN
Qty: 30 TABLET | Refills: 0 | Status: ON HOLD | OUTPATIENT
Start: 2021-06-30 | End: 2021-07-26 | Stop reason: HOSPADM

## 2021-06-30 RX ADMIN — METFORMIN HYDROCHLORIDE 500 MG: 500 TABLET ORAL at 07:59

## 2021-06-30 RX ADMIN — CLONIDINE HYDROCHLORIDE 0.1 MG: 0.1 TABLET ORAL at 07:59

## 2021-06-30 RX ADMIN — PANTOPRAZOLE SODIUM 40 MG: 40 TABLET, DELAYED RELEASE ORAL at 07:59

## 2021-06-30 RX ADMIN — QUETIAPINE FUMARATE 50 MG: 50 TABLET ORAL at 13:20

## 2021-06-30 RX ADMIN — TIZANIDINE 4 MG: 4 TABLET ORAL at 08:48

## 2021-06-30 RX ADMIN — TIZANIDINE 4 MG: 4 TABLET ORAL at 16:45

## 2021-06-30 RX ADMIN — MELOXICAM 15 MG: 7.5 TABLET ORAL at 08:00

## 2021-06-30 RX ADMIN — DULOXETINE HYDROCHLORIDE 30 MG: 30 CAPSULE, DELAYED RELEASE ORAL at 07:59

## 2021-06-30 RX ADMIN — QUETIAPINE FUMARATE 50 MG: 50 TABLET ORAL at 07:59

## 2021-06-30 RX ADMIN — METFORMIN HYDROCHLORIDE 500 MG: 500 TABLET ORAL at 16:45

## 2021-06-30 RX ADMIN — Medication 1 TABLET: at 07:59

## 2021-06-30 RX ADMIN — TIZANIDINE 4 MG: 4 TABLET ORAL at 01:09

## 2021-06-30 RX ADMIN — BUPRENORPHINE AND NALOXONE 3 FILM: 4; 1 FILM BUCCAL; SUBLINGUAL at 10:02

## 2021-06-30 RX ADMIN — CLONIDINE HYDROCHLORIDE 0.1 MG: 0.1 TABLET ORAL at 13:20

## 2021-06-30 RX ADMIN — FENOFIBRATE 160 MG: 160 TABLET ORAL at 08:00

## 2021-06-30 ASSESSMENT — PAIN SCALES - GENERAL: PAINLEVEL_OUTOF10: 6

## 2021-06-30 NOTE — GROUP NOTE
Group Therapy Note    Date: 6/29/2021    Group Start Time: 2005  Group End Time: 2045  Group Topic: Wrap-Up    TIESHA Manrique        Group Therapy Note    Attendees: 11/19           Status After Intervention:  Improved    Participation Level:  Active Listener    Participation Quality: Appropriate      Speech:  normal      Thought Process/Content: Logical      Affective Functioning: Congruent      Mood: elevated      Level of consciousness:  Alert      Response to Learning: Able to verbalize current knowledge/experience      Endings: None Reported    Modes of Intervention: Education      Discipline Responsible: Behavorial Health Tech      Signature:  Truong Hooper

## 2021-06-30 NOTE — BH NOTE
Patient given tobacco quitline number 9-299.377.7495 at this time. With nurse observation patient called number for information and follow up. Continue to reinforce the dangers of long term tobacco use and why tobacco cessation is important to patient.

## 2021-06-30 NOTE — BH NOTE
[facility]  Discharge Note    Pt discharged with followings belongings:   Dentures: None  Vision - Corrective Lenses: None  Hearing Aid: None  Jewelry: Other (Comment)  Clothing: Footwear, Pants, Shirt, Other (Comment) (see sheet)  Were All Patient Medications Collected?: Not Applicable  Other Valuables: Other (Comment) (see sheet)   Valuables sent home with patient. Valuables retrieved from safe, Security envelope number:  [id number] and returned to patient. Patient education on aftercare instructions: reviewed and copy given to patient  Information faxed to [facility] [phone] by nurse Patient verbalize understanding of AVS:  yes.     Status EXAM upon discharge: alert and oriented  Status and Exam  Normal: No  Facial Expression: Flat  Affect: Appropriate  Level of Consciousness: Alert  Mood:Normal: No  Mood: Depressed, Anxious  Motor Activity:Normal: Yes  Motor Activity: Decreased  Interview Behavior: Cooperative  Preception: [place] to Person, [place] to Time, [place] to Place, [place] to Situation  Attention:Normal: No  Attention: Distractible, Unable to Concentrate  Thought Processes: Circumstantial  Thought Content:Normal: No  Thought Content: Preoccupations  Hallucinations: None  Delusions: No  Memory:Normal: No  Memory: Poor Recent, Poor Remote  Insight and Judgment: No  Insight and Judgment: Poor Judgment, Poor Insight, Unrealistic  Present Suicidal Ideation: No  Present Homicidal Ideation: No      Metabolic Screening:    Lab Results   Component Value Date    LABA1C 5.7 10/23/2014       Lab Results   Component Value Date    CHOL 205 (H) 02/21/2017    CHOL 158 11/09/2015    CHOL 152 10/23/2014    CHOL 206 (H) 03/05/2014    CHOL 205 (H) 01/22/2014    CHOL 208 (H) 08/28/2013     Lab Results   Component Value Date    TRIG 189 (H) 02/21/2017    TRIG 223 (H) 11/09/2015    TRIG 52 10/23/2014    TRIG 104 03/05/2014    TRIG 74 01/22/2014    TRIG 104 08/28/2013     Lab Results   Component Value Date

## 2021-06-30 NOTE — GROUP NOTE
Group Therapy Note    Date: 6/30/2021    Group Start Time: 1100  Group End Time: 2615  Group Topic: Cognitive Skills    TIESHA Paige, TANYAS        Group Therapy Note    Attendees: 9         Pt did not attend 1100 cognitive skills group d/t resting in room despite staff invitation to attend. 1:1 talk time offered as alternative to group session, pt declined.       Signature:  Esvin Jorgensen

## 2021-06-30 NOTE — PROGRESS NOTES
Pharmacy Med Education Group Note    Date: 06/30/21  Start Time: 0659  End Time: 9803    Number Participants in Group:  11    Goal:  Patient will demonstrate an understanding of the medications intended purpose and possible adverse effects  Topic: [place] for Pharmacy Med Ed Group    Discipline Responsible:     OT  AT  [facility].  RT     X Other       Participation Level:     None  Minimal      X Active Listener    X Interactive    Monopolizing         Participation Quality:    X Appropriate  Inappropriate     X       Attentive        Intrusive          Sharing        Resistant          Supportive        Lethargic       Affective:     X Congruent  Incongruent  Blunted  Flat    Constricted  Anxious  Elated  Angry    Labile  Depressed  Other         Cognitive:    X Alert  Oriented PPTP     Concentration   X G  F  P   Attention Span   X G  F  P   Short-Term Memory   X G  F  P   Long-Term Memory  G  F  P   ProblemSolving/  Decision Making  G  F  P   Ability to Process  Information   X G  F  P      Contributing Factors             Delusional             Hallucinating             Flight of Ideas             Other:       Modes of Intervention:    X Education   X Support  Exploration    Clarifying  Problem Solving  Confrontation    Socialization  Limit Setting  Reality Testing    Activity  Movement  Media    Other:            Response to Learning:    X Able to verbalize current knowledge/experience    Able to verbalize/acknowledge new learning    Able to retain information    Capable of insight    Able to change behavior    Progressing to goal    Other:        Comments: Patient left group early.     Kimberlyn Amaro, PharmD, BCPS  6/30/2021 4:04 PM

## 2021-06-30 NOTE — SUICIDE SAFETY PLAN
SAFETY PLAN    A suicide Safety Plan is a document that supports someone when they are having thoughts of suicide. Warning Signs that indicate a suicidal crisis may be developing: What (situations, thoughts, feelings, body sensations, behaviors, etc.) do you experience that lets you know you are beginning to think about suicide? 1. Go off medications  2. Mood is depressed and start to feel sad, hopeless, helpless, guilty, decline in self-esteem, excess worry, no interest in doing any pleasurable activities, unable to concentrate  3. Begin to cry over the smallest of things  4. Not eating or sleeping as normal  5. Relationship issues start happening  6. I become angry and start a fight  7. When I dont listen or respond to people in a good, positive way  8. Increase drug use      Internal Coping Strategies:  What things can I do (relaxation techniques, hobbies, physical activities, etc.) to take my mind off my problems without contacting another person? 1. Go to hospital discharge appointments and follow-up with community mental health counseling  2. Talk with other people  3. Learn to identify and control your emotions by new ways  4. Think before you speak or act; walk away from the situation  5. Join a support group in person or on Social Media  6. Take a time-out  7. Take deep breaths; use relaxation techniques  8. Get some exercise; go for a walk  9. Read; listen to music; watch a funny movie    10. Coping skills/ strategies - journal/ listen to music/ go for a walk/ read a book/ watch a funny movie/show / crafts / video game   11.  Grounding techniques- eat a sour candy or hot cinnamon candy / focus on colors, sounds, smells, textures on things around you / drink some herbal tea / eat a piece of dark chocolate / take a hot bath or shower / essential oils for smelling / meditate / color / arts and crafts    People whom I can ask for help: Who can I call when I need help - for example, friends, family, clergy, someone else? 1. Family and friend    Professionals or [address] I can contact during a crisis: Who can I call for help - for example, my doctor, my psychiatrist, my psychologist, a mental health provider, a suicide hotline? 1. Cassie  2. Suicide Prevention Lifeline: 9-468-870-TALK (7308)  3. [facility]: Emergency Services Address: [address], [place],  [address], Phone: (654) [phone]  4. [employer]: 2-1-1, 883.494.1801 or [facility] Emergency Services - for example, [address], [facility] suicide hotline,   1. [place] [address], 0-968.749.6284  2. Mental Health & Recovery Services Board [facility], Crisis line: 935.526.7039  3. [employer] (Crisis Intervention Team - CIT), 256.899.1991 or 9-1-1  4. [address], 1-633.309.5978  5. National Association of Mental Illness, 9-468.737.3237  6. [facility] Substance Abuse National Helpline, 6-603-585-HELP (1838)  7. Crisis Text Line, Text 4HOPE to 038564 to connect with a crisis counselor  8. [facility], 0-778.136.1920  9. JANINE (Rape, [address]), 5-469.270.1349  10. [employer] (Alcohol / Drug help)  11. Call the Recovery Helpline at 38 857 211 (24 hours a day - 7 days a week)  12. COVID-19 Emotional Support Line: 484.658.2253    Making the environment safe: How can I make my environment (house/apartment/living space) safer? For example, can I remove guns, medications, and other items? 1. Remove unsafe objects  2. Keep Medications in safe and secure location  3.  Plan daily goals to help remember to stay on specific medications

## 2021-06-30 NOTE — DISCHARGE SUMMARY
DISCHARGE SUMMARY      Patient ID:  Sharifa Lorenz  [id number]  86 y.o.  1990    Admit date: 6/27/2021    Discharge date and time: 6/30/2021    Disposition: Home   Admitting Physician: Benjamin Velasquez MD     Discharge Physician: Dr Teresita Lynch MD    Admission Diagnoses: Schizoaffective disorder ([facility] [F25.9]  Schizoaffective disorder, bipolar type ([facility] [F25.0]    Admission Condition: poor    Discharged Condition: stable    Admission Circumstance: Sharifa Lorenz is a 27 y.o. male with a history of schizoaffective disorder bipolar type and polysubstance abuse who presented to the emergency department to the ED for itching and anxiety and then he reported he was suicidal ideation with a plan cut his wrist when he was being discharged.     Per the ED social work note:  Patient reports he was living in the [facility] area and recently returned to [facility] a few days ago and is not linked and not on medications.  Patient denies [facility] and reports poor sleep and appetite.  Patient then denied any SI [facility] to staff and was discharged to rescue. Nahomi Curry returned an hour and half later and reports rescue sent him here and that they had no beds open and were closing in a few days.  Patient reported I was thinking about cutting myself I just need help before I get worse. Nahomi Curry then reported he graduated [employer] [facility] in [facility] [place] and has a suboxone script that runs out on Thursday and needs to be linked with someone.  Patient reports Dr Autumn Ponce was the doctor at [facility] and needs to dose. Nahomi Curry continues to deny HI [facility] reports poor sleep with 5-6 hours and reports fair appetite. Denies any current AOD use and legal issues.      Venu Ambriz states he got into it with his brother when he came back to [facility] [place] last week. He states his depression and anxiety got bad after that. He says he came to the hospital to get back on his medications and feel right.  He states he told them in the ED that he was feeling depressed and his anxiety was bad. He denies telling the ED providers that he was suicidal. However, it was documented that he verbalized suicidal ideation with a plan to cut himself when told he was being discharged. He reports the way his life is going has been making him feel depressed. He has not been getting along with his brother and immediate family. He currently lives with his brother. He also states trying to get back and go to school and work has been stressful. He says this episode of depression started a week and a half ago. He endorses feeling down and sad for more days than not. He has not been sleeping well prior to admission. He would only get a few hours of sleep a night. He states stress was keeping him up. He says when he could not sleep he tried to take sleeping meds. He feels tired most of the days when he wakes up in the morning. He has been eating alright at home. He states his energy has been horrible throughout the day. His motivation has been none. He has been having trouble with attention and concentration. He has been feeling worthless, hopeless and helpless. He denies any recent hallucinations or delusional thinking. He reports his anxiety has been increased lately. He has been having a hard time focusing and thinking straight. Endorses racing thoughts that are worse at night. He has been feeling more irritable and on edge. He has been feeling restless. He denies any recent panic attacks.      Elihu Pallas states he is not doing good today because he has not had his Suboxone yet. He continues to feel depressed and anxious today. He endorses passive suicidal ideation without plan or intent. He contracts for safety on the unit. He denies any active suicidal ideation at this time. He denies any homicidal ideation. Denies any hallucinations. No evidence of delusions or overt psychosis on examination. He is asking to have his Suboxone resumed. His UDS was + for opiates although he denies opiate use.        PAST MEDICAL/PSYCHIATRIC HISTORY:   Past Medical History:   Diagnosis Date    Anxiety     Arthritis     Asthma     Bipolar I disorder, most recent episode (or current) depressed, unspecified 9/12/2014    Clostridium difficile infection     COPD (chronic obstructive pulmonary disease) ([facility]     Depression     Disease of blood and blood forming organ     Eczema     Fracture, metacarpal     R 4th and 5th    Gastric ulcer     Gastritis 06/13/2018    GERD (gastroesophageal reflux disease)     GI bleed     H. 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[facility]     Substance abuse ([facility]     Suicidal ideation     Suicide attempt by hanging ([facility]     Tobacco dependence     Ulcerative colitis ([facility]     UTI (urinary tract infection)        FAMILY/SOCIAL HISTORY:  Family History   Problem Relation Age of Onset    Diabetes Father     Alcohol Abuse Father     Depression Father     Arthritis Father     High Blood Pressure Father     Other Father         aneurysm & epilepsy    Migraines Father     Arthritis Mother     Other Mother         aneurysm & epilepsy    Migraines Mother     Diabetes Brother         Aunt and uncles    Depression Brother     Mental Illness Brother     Other Brother         epilepsy    Migraines Brother     Stroke Other         Uncle    Other Brother         murdered Oct 6th, 2014    Colon Cancer Paternal Cousin 43    Other Sister         epilepsy    Migraines Sister      Social History     Socioeconomic History    Marital status: Single     Spouse name: Not on file    Number of children: Not on file    Years of education: Not on file    Highest education level: Not on file   Occupational History    Occupation: disability   Tobacco Use    Smoking status: Current Every Day Smoker     Packs/day: 1.00     Years: 15.00     Pack years: 15.00     Types: Cigarettes    Smokeless tobacco: Never Used   Vaping Use    Vaping Use: Former   Substance and Sexual Activity    Alcohol use: Yes     Comment: social ETOH use    Drug use: Not Currently     Types: Opiates      Comment: last used pain pill/fentanyl 2/18/21    Sexual activity: Yes     Partners: Female     Comment: Lives alone, not working. Other Topics Concern    Not on file   Social History Narrative    ** Merged History Encounter **          Social Determinants of Health     Financial Resource Strain: Unknown    Difficulty of Paying Living Expenses: Patient refused   Food Insecurity: Unknown    Worried About Running Out of Food in the Last Year: Patient refused    [address] in the Last Year: Patient refused   Transportation Needs:     Lack of Transportation (Medical):      Lack of Transportation (Non-Medical):    Physical Activity:     Days of Exercise per Week:     Minutes of Exercise per Session:    Stress:     Feeling of Stress :    Social Connections:     Frequency of Communication with Friends and Family:     Frequency of Social Gatherings with Friends and Family:     Attends Cheondo[employer] Services:     Active Member of Clubs or Organizations:     Attends Club or Organization Meetings:     Marital Status:    Intimate Partner Violence:     Fear of Current or Ex-Partner:     Emotionally Abused:     Physically Abused:     Sexually Abused:        MEDICATIONS:    Current Facility-Administered Medications:     albuterol sulfate  (90 Base) MCG/ACT inhaler 2 puff, 2 puff, Inhalation, Q6H PRN, [employer], PA-C    cloNIDine (CATAPRES) tablet 0.1 mg, 0.1 mg, Oral, TID, [employer], PA-C, 0.1 mg at 06/30/21 1320    DULoxetine (CYMBALTA) extended release capsule 30 mg, 30 mg, Oral, Daily, [employer], PA-C, 30 mg at 06/30/21 0759    fenofibrate (TRIGLIDE) tablet 160 mg, 160 mg, Oral, Daily, [employer] PA-C, 160 mg at 06/30/21 0800    meloxicam (MOBIC) tablet 15 mg, 15 mg, Oral, Daily, [employer], PA-C, 15 mg at 06/30/21 0800    metFORMIN (GLUCOPHAGE) tablet 500 mg, 500 mg, Oral, BID WC, Juan Holly PA-C, 500 mg at 06/30/21 6310    therapeutic multivitamin-minerals 1 tablet, 1 tablet, Oral, Daily, [employer], PA-C, 1 tablet at 06/30/21 0759    pantoprazole (PROTONIX) tablet 40 mg, 40 mg, Oral, QAM AC, Juan Holly PA-C, 40 mg at 06/30/21 0759    QUEtiapine (SEROQUEL) tablet 200 mg, 200 mg, Oral, Nightly, [employer], PA-LALIT, 200 mg at 06/29/21 1958    QUEtiapine (SEROQUEL) tablet 50 mg, 50 mg, Oral, BID- 8&2, [employer], PA-LALIT, 50 mg at 06/30/21 1320    traZODone (DESYREL) tablet 100 mg, 100 mg, Oral, Nightly, Juan Holly PA-C, 100 mg at 06/29/21 1958    buprenorphine-naloxone (SUBOXONE) 4-1 MG SL film 3 Film, 3 Film, Sublingual, Daily, Augie Bravo MD, 3 Film at 06/30/21 1002    tiZANidine (ZANAFLEX) tablet 4 mg, 4 mg, Oral, Q8H PRN, Augie Bravo MD, 4 mg at 06/30/21 0848    diphenhydrAMINE (BENADRYL) tablet 25 mg, 25 mg, Oral, Q8H PRN, Augie Bravo MD, 25 mg at 06/29/21 2051    acetaminophen (TYLENOL) tablet 650 mg, 650 mg, Oral, Q4H PRN, Augie Bravo MD    aluminum & magnesium hydroxide-simethicone (MAALOX) 200-200-20 MG/5ML suspension 30 mL, 30 mL, Oral, Q6H PRN, Augie Bravo MD    polyethylene glycol (GLYCOLAX) packet 17 g, 17 g, Oral, Daily PRN, Augie Bravo MD    OLANZapine (ZYPREXA) injection 5 mg, 5 mg, Intramuscular, Q6H PRN **AND** sterile water injection 2.1 mL, 2.1 mL, Intramuscular, Once, Augie Bravo MD    OLANZapine (ZYPREXA) tablet 5 mg, 5 mg, Oral, Q6H PRN, Augie Bravo MD, 5 mg at 06/29/21 1847    nicotine (NICODERM CQ) 14 MG/24HR 1 patch, 1 patch, Transdermal, Daily, Augie Bravo MD, 1 patch at 06/30/21 0810    Examination:  /74   Pulse 100   Temp 98 °F (36.7 °C) (Oral)   Resp 14   Ht 5' 9\" (1.753 m)   Wt 200 lb (90.7 kg)   SpO2 95%   BMI 29.53 kg/m²   Gait - steady    HOSPITAL COURSE[de-identified]  Following admission to the hospital, patient had a complete physical exam and blood work up, which was unremarkable. The patient showed medication seeking behaviors. The patient was started on his prior to admission Suboxone. Patient was monitored closely with suicide precaution  Was encouraged to participate in group and other milieu activity  Patient started to feel better with this combination of treatment. Significant progress in the symptoms since admission. Mood is improved  The patient denies AVH or paranoid thoughts  The patient denies any hopelessness or worthlessness  No active SI/HI  Appetite:  [x] Normal  [] Increased  [] Decreased    Sleep:       [x] Normal  [] Fair       [] Poor            Energy:    [x] Normal  [] Increased  [] Decreased     SI [] Present  [x] Absent  HI  []Present  [x] Absent   Aggression:  [] yes  [] no  Patient is [x] able  [] unable to CONTRACT FOR SAFETY   Medication side effects(SE):  [x] None(Psych.  Meds.) [] Other      Mental Status Examination on discharge:    Level of consciousness:  within normal limits   Appearance:  well-appearing  Behavior/Motor:  no abnormalities noted  Attitude toward examiner:  attentive and good eye contact  Speech:  spontaneous, normal rate and normal volume   Mood: anxious  Affect:  mood congruent  Thought processes:  linear, goal directed and coherent   Thought content:  Suicidal Ideation:  denies suicidal ideation  Delusions:  no evidence of delusions  Perceptual Disturbance:  denies any perceptual disturbance  Cognition:  oriented to person, place, and time   Concentration intact  Memory intact  Insight good   Judgement fair   Fund of Knowledge adequate      ASSESSMENT:  Patient symptoms are:  [x] Well controlled  [x] Improving  [] Worsening  [] No change      Diagnosis:  Principal Problem: Schizoaffective disorder, bipolar type ([facility]  Resolved Problems:    * No resolved hospital problems. *      LABS:    Recent Labs     06/27/21  1700   WBC 7.2   HGB 13.6        Recent Labs     06/27/21  1700      K 3.7   CL 99   CO2 28   BUN 12   CREATININE 0.81   GLUCOSE 109*     Recent Labs     06/27/21  1700   BILITOT <0.15*   ALKPHOS 72   AST <5   ALT <5*     Lab Results   Component Value Date    LABAMPH NEG 05/27/2021    BARBSCNU NEGATIVE 06/27/2021    LABBENZ NEGATIVE 06/27/2021    LABBENZ NEGATIVE 06/12/2013    LABMETH NEGATIVE 06/27/2021    OPIATESCREENURINE POS 05/27/2021    PHENCYCLIDINESCREENURINE NEG 05/27/2021    PPXUR NOT REPORTED 06/27/2021     Lab Results   Component Value Date    TSH 0.59 03/28/2018     No results found for: LITHIUM  No results found for: VALPROATE, CBMZ    RISK ASSESSMENT AT DISCHARGE: Low risk for suicide and homicide. Treatment Plan:  Reviewed current Medications with the patient. Education provided on the complaince with treatment. Risks, benefits, side effects, drug-to-drug interactions and alternatives to treatment were discussed. Encourage patient to attend outpatient follow up appointment and therapy. Patient was advised to call the outpatient provider, visit the nearest ED or call 911 if symptoms are not manageable. Medication List      CHANGE how you take these medications    Aristada 1064 MG/3.9ML Prsy injection  Generic drug: ARIPiprazole Lauroxil  Inject 3.9 mLs into the muscle Every 2 months for 1 dose  Start taking on: August 17, 2021  What changed:   · when to take this  · These instructions start on August 17, 2021. If you are unsure what to do until then, ask your doctor or other care provider.      fenofibrate 160 MG tablet  Commonly known as: TRIGLIDE  Take 1 tablet by mouth daily  Start taking on: July 1, 2021  What changed:   · medication strength  · how much to take        CONTINUE taking these medications    albuterol sulfate  (90 Base) MCG/ACT inhaler  Commonly known as: Ventolin HFA  Inhale 2 puffs into the lungs every 6 hours as needed for Wheezing     buprenorphine-naloxone 12-3 MG sublingual film  Commonly known as: Suboxone  Place 1 Film under the tongue daily for 15 days. cloNIDine 0.1 MG tablet  Commonly known as: CATAPRES  Take 1 tablet by mouth 3 times daily     DULoxetine 30 MG extended release capsule  Commonly known as: Cymbalta  Take 1 capsule by mouth daily     meloxicam 15 MG tablet  Commonly known as: MOBIC     metFORMIN 500 MG tablet  Commonly known as: GLUCOPHAGE  Take 1 tablet by mouth 2 times daily (with meals)     naloxone 4 MG/0.1ML Liqd nasal spray  1 spray by Nasal route as needed for Opioid Reversal     pantoprazole 40 MG tablet  Commonly known as: PROTONIX  Take 1 tablet by mouth every morning (before breakfast)     * QUEtiapine 50 MG tablet  Commonly known as: SEROQUEL  Take 1 tablet by mouth 2 times daily at 0800 and 1400     * QUEtiapine 200 MG tablet  Commonly known as: SEROQUEL  Take 1 tablet by mouth nightly     therapeutic multivitamin-minerals tablet  Take 1 tablet by mouth daily     tiZANidine 4 MG tablet  Commonly known as: Zanaflex  Take 1 tablet by mouth every 8 hours as needed (HEADACHES, BACK PAIN)     traZODone 100 MG tablet  Commonly known as: DESYREL  Take 1 tablet by mouth nightly         * This list has 2 medication(s) that are the same as other medications prescribed for you. Read the directions carefully, and ask your doctor or other care provider to review them with you.             STOP taking these medications    Belsomra 20 MG Tabs  Generic drug: Suvorexant     hydrOXYzine 25 MG tablet  Commonly known as: ATARAX     permethrin 5 % cream  Commonly known as: Elimite           Where to Get Your Medications      These medications were sent to [facility], [address], [address] [facility]

## 2021-06-30 NOTE — PLAN OF CARE
Problem: Altered Mood, Depressive Behavior:  Goal: Able to verbalize and/or display a decrease in depressive symptoms  Description: Able to verbalize and/or display a decrease in depressive symptoms  6/29/2021 2124 by Sundeep Ma  Outcome: Ongoing  Note: Patient denies suicidal ideation at this time. Out in the milieu, social with peers. Anxious. Cooperative with all prescribed medications during this shift. Frequently asks for medications for anxiety, agitation, itching. Safety checks maintained q15min and irregular rounding. Problem: Altered Mood, Depressive Behavior:  Goal: Absence of self-harm  Description: Absence of self-harm  6/29/2021 2124 by Sundeep Ma  Outcome: Ongoing  Note: Patient remains free from self harm at this time. Pt denies thoughts of self harm and is agreeable to seeking out should thoughts of self harm arise. Safe environment maintained. Q15 minute checks for safety cont per unit policy. Will cont to monitor for safety and provides support and reassurance as needed.

## 2021-06-30 NOTE — PROGRESS NOTES
CLINICAL PHARMACY NOTE: MEDS TO BEDS    Total # of Prescriptions Filled: 2   The following medications were delivered to the patient:  · Fenofibrate and Narcan  ·   Additional Documentation:  Multivit not covered;5 rx were too soon to fill- all fillable on 08/22  Seroquel, Tizanidine, Trazodone, Cymbalta and Metformin

## 2021-07-07 ENCOUNTER — APPOINTMENT (OUTPATIENT)
Dept: GENERAL RADIOLOGY | Age: 31
End: 2021-07-07
Payer: MEDICARE

## 2021-07-07 ENCOUNTER — HOSPITAL ENCOUNTER (EMERGENCY)
Age: 31
Discharge: HOME | End: 2021-07-07
Attending: EMERGENCY MEDICINE
Payer: MEDICARE

## 2021-07-07 VITALS
OXYGEN SATURATION: 97 % | TEMPERATURE: 97.9 F | SYSTOLIC BLOOD PRESSURE: 152 MMHG | BODY MASS INDEX: 29.53 KG/M2 | HEART RATE: 90 BPM | RESPIRATION RATE: 17 BRPM | DIASTOLIC BLOOD PRESSURE: 91 MMHG | WEIGHT: 200 LBS

## 2021-07-07 DIAGNOSIS — R10.9 ABDOMINAL PAIN, UNSPECIFIED ABDOMINAL LOCATION: Primary | ICD-10-CM

## 2021-07-07 LAB
ABSOLUTE EOS #: 0.26 K/UL (ref 0–0.44)
ABSOLUTE IMMATURE GRANULOCYTE: <0.03 K/UL (ref 0–0.3)
ABSOLUTE LYMPH #: 1.98 K/UL (ref 1.1–3.7)
ABSOLUTE MONO #: 0.47 K/UL (ref 0.1–1.2)
ALBUMIN SERPL-MCNC: 3.9 G/DL (ref 3.5–5.2)
ALBUMIN/GLOBULIN RATIO: 1.3 (ref 1–2.5)
ALP BLD-CCNC: 67 U/L (ref 40–129)
ALT SERPL-CCNC: 12 U/L (ref 5–41)
ANION GAP SERPL CALCULATED.3IONS-SCNC: 6 MMOL/L (ref 9–17)
AST SERPL-CCNC: 20 U/L
BASOPHILS # BLD: 0 % (ref 0–2)
BASOPHILS ABSOLUTE: <0.03 K/UL (ref 0–0.2)
BILIRUB SERPL-MCNC: <0.1 MG/DL (ref 0.3–1.2)
BUN BLDV-MCNC: 7 MG/DL (ref 6–20)
BUN/CREAT BLD: ABNORMAL (ref 9–20)
CALCIUM SERPL-MCNC: 8.6 MG/DL (ref 8.6–10.4)
CHLORIDE BLD-SCNC: 106 MMOL/L (ref 98–107)
CO2: 29 MMOL/L (ref 20–31)
CREAT SERPL-MCNC: 0.77 MG/DL (ref 0.7–1.2)
DIFFERENTIAL TYPE: ABNORMAL
EOSINOPHILS RELATIVE PERCENT: 4 % (ref 1–4)
GFR AFRICAN AMERICAN: >60 ML/MIN
GFR NON-AFRICAN AMERICAN: >60 ML/MIN
GFR SERPL CREATININE-BSD FRML MDRD: ABNORMAL ML/MIN/{1.73_M2}
GFR SERPL CREATININE-BSD FRML MDRD: ABNORMAL ML/MIN/{1.73_M2}
GLUCOSE BLD-MCNC: 97 MG/DL (ref 70–99)
HCT VFR BLD CALC: 37.3 % (ref 40.7–50.3)
HEMOGLOBIN: 12.3 G/DL (ref 13–17)
IMMATURE GRANULOCYTES: 0 %
LIPASE: 19 U/L (ref 13–60)
LYMPHOCYTES # BLD: 31 % (ref 24–43)
MCH RBC QN AUTO: 29.9 PG (ref 25.2–33.5)
MCHC RBC AUTO-ENTMCNC: 33 G/DL (ref 28.4–34.8)
MCV RBC AUTO: 90.5 FL (ref 82.6–102.9)
MONOCYTES # BLD: 8 % (ref 3–12)
NRBC AUTOMATED: 0 PER 100 WBC
PDW BLD-RTO: 13.2 % (ref 11.8–14.4)
PLATELET # BLD: 257 K/UL (ref 138–453)
PLATELET ESTIMATE: ABNORMAL
PMV BLD AUTO: 10.4 FL (ref 8.1–13.5)
POTASSIUM SERPL-SCNC: 4 MMOL/L (ref 3.7–5.3)
RBC # BLD: 4.12 M/UL (ref 4.21–5.77)
RBC # BLD: ABNORMAL 10*6/UL
SEG NEUTROPHILS: 57 % (ref 36–65)
SEGMENTED NEUTROPHILS ABSOLUTE COUNT: 3.56 K/UL (ref 1.5–8.1)
SODIUM BLD-SCNC: 141 MMOL/L (ref 135–144)
TOTAL PROTEIN: 6.8 G/DL (ref 6.4–8.3)
WBC # BLD: 6.3 K/UL (ref 3.5–11.3)
WBC # BLD: ABNORMAL 10*3/UL

## 2021-07-07 PROCEDURE — C9113 INJ PANTOPRAZOLE SODIUM, VIA: HCPCS | Performed by: STUDENT IN AN ORGANIZED HEALTH CARE EDUCATION/TRAINING PROGRAM

## 2021-07-07 PROCEDURE — 96374 THER/PROPH/DIAG INJ IV PUSH: CPT

## 2021-07-07 PROCEDURE — 96375 TX/PRO/DX INJ NEW DRUG ADDON: CPT

## 2021-07-07 PROCEDURE — 99282 EMERGENCY DEPT VISIT SF MDM: CPT

## 2021-07-07 PROCEDURE — 85025 COMPLETE CBC W/AUTO DIFF WBC: CPT

## 2021-07-07 PROCEDURE — 2580000003 HC RX 258: Performed by: STUDENT IN AN ORGANIZED HEALTH CARE EDUCATION/TRAINING PROGRAM

## 2021-07-07 PROCEDURE — 6360000002 HC RX W HCPCS: Performed by: STUDENT IN AN ORGANIZED HEALTH CARE EDUCATION/TRAINING PROGRAM

## 2021-07-07 PROCEDURE — 74022 RADEX COMPL AQT ABD SERIES: CPT

## 2021-07-07 PROCEDURE — 80053 COMPREHEN METABOLIC PANEL: CPT

## 2021-07-07 PROCEDURE — 83690 ASSAY OF LIPASE: CPT

## 2021-07-07 PROCEDURE — 96372 THER/PROPH/DIAG INJ SC/IM: CPT

## 2021-07-07 RX ORDER — SODIUM CHLORIDE 9 MG/ML
10 INJECTION INTRAVENOUS ONCE
Status: COMPLETED | OUTPATIENT
Start: 2021-07-07 | End: 2021-07-07

## 2021-07-07 RX ORDER — CALCIUM CARBONATE 200(500)MG
1 TABLET,CHEWABLE ORAL DAILY
Qty: 30 TABLET | Refills: 0 | Status: ON HOLD | OUTPATIENT
Start: 2021-07-07 | End: 2021-07-22

## 2021-07-07 RX ORDER — DIPHENHYDRAMINE HYDROCHLORIDE 50 MG/ML
12.5 INJECTION INTRAMUSCULAR; INTRAVENOUS EVERY 6 HOURS PRN
Status: DISCONTINUED | OUTPATIENT
Start: 2021-07-07 | End: 2021-07-07 | Stop reason: HOSPADM

## 2021-07-07 RX ORDER — PROMETHAZINE HYDROCHLORIDE 25 MG/ML
25 INJECTION, SOLUTION INTRAMUSCULAR; INTRAVENOUS ONCE
Status: COMPLETED | OUTPATIENT
Start: 2021-07-07 | End: 2021-07-07

## 2021-07-07 RX ORDER — PANTOPRAZOLE SODIUM 40 MG/10ML
40 INJECTION, POWDER, LYOPHILIZED, FOR SOLUTION INTRAVENOUS ONCE
Status: COMPLETED | OUTPATIENT
Start: 2021-07-07 | End: 2021-07-07

## 2021-07-07 RX ORDER — MORPHINE SULFATE 4 MG/ML
4 INJECTION, SOLUTION INTRAMUSCULAR; INTRAVENOUS ONCE
Status: COMPLETED | OUTPATIENT
Start: 2021-07-07 | End: 2021-07-07

## 2021-07-07 RX ORDER — DIPHENHYDRAMINE HCL 25 MG
25 CAPSULE ORAL EVERY 6 HOURS PRN
Qty: 10 CAPSULE | Refills: 0 | Status: SHIPPED | OUTPATIENT
Start: 2021-07-07 | End: 2021-07-17

## 2021-07-07 RX ADMIN — PROMETHAZINE HYDROCHLORIDE 25 MG: 25 INJECTION INTRAMUSCULAR; INTRAVENOUS at 17:52

## 2021-07-07 RX ADMIN — SODIUM CHLORIDE 10 ML: 9 INJECTION, SOLUTION INTRAMUSCULAR; INTRAVENOUS; SUBCUTANEOUS at 17:52

## 2021-07-07 RX ADMIN — PANTOPRAZOLE SODIUM 40 MG: 40 INJECTION, POWDER, FOR SOLUTION INTRAVENOUS at 17:52

## 2021-07-07 RX ADMIN — MORPHINE SULFATE 4 MG: 4 INJECTION INTRAVENOUS at 17:52

## 2021-07-07 RX ADMIN — DIPHENHYDRAMINE HYDROCHLORIDE 12.5 MG: 50 INJECTION, SOLUTION INTRAMUSCULAR; INTRAVENOUS at 18:59

## 2021-07-07 ASSESSMENT — ENCOUNTER SYMPTOMS
RHINORRHEA: 0
VOMITING: 1
BACK PAIN: 0
BLOOD IN STOOL: 1
DIARRHEA: 0
CONSTIPATION: 0
SORE THROAT: 0
WHEEZING: 0
ABDOMINAL DISTENTION: 0
TROUBLE SWALLOWING: 0
SHORTNESS OF BREATH: 0
ABDOMINAL PAIN: 0
NAUSEA: 1
COUGH: 0
CHEST TIGHTNESS: 0

## 2021-07-07 ASSESSMENT — PAIN DESCRIPTION - DESCRIPTORS: DESCRIPTORS: ACHING;DISCOMFORT

## 2021-07-07 ASSESSMENT — PAIN DESCRIPTION - PAIN TYPE: TYPE: ACUTE PAIN

## 2021-07-07 ASSESSMENT — PAIN DESCRIPTION - LOCATION: LOCATION: ABDOMEN

## 2021-07-07 ASSESSMENT — PAIN SCALES - GENERAL
PAINLEVEL_OUTOF10: 8
PAINLEVEL_OUTOF10: 9

## 2021-07-07 NOTE — ED PROVIDER NOTES
[address] [facility]     Emergency Department     Faculty Attestation    I performed a history and physical examination of the patient and discussed management with the resident. I reviewed the residents note and agree with the documented findings and plan of care. Any areas of disagreement are noted on the chart. I was personally present for the key portions of any procedures. I have documented in the chart those procedures where I was not present during the key portions. I have reviewed the emergency nurses triage note. I agree with the chief complaint, past medical history, past surgical history, allergies, medications, social and family history as documented unless otherwise noted below. For Physician Assistant/ Nurse Practitioner cases/documentation I have personally evaluated this patient and have completed at least one if not all key elements of the E/M (history, physical exam, and MDM). Additional findings are as noted. This patient was evaluated in the Emergency Department for symptoms described in the history of present illness. He/she was evaluated in the context of the global COVID-19 pandemic, which necessitated consideration that the patient might be at risk for infection with the SARS-CoV-2 virus that causes COVID-19. Institutional protocols and algorithms that pertain to the evaluation of patients at risk for COVID-19 are in a state of rapid change based on information released by regulatory bodies including the CDC and federal and state organizations. These policies and algorithms were followed during the patient's care in the ED. Patient here with abdominal pain reported hematemesis and bloody stools. States he was scheduled at [facility] today for scopes but states there was a scheduling problem and they could not get him in today and sent him home rescheduling him for next week. No new complaints. Abdominal pain mid abdomen. No fevers. Not anticoagulated.   On exam nontoxic well-appearing talking with the nurse but is holding his abdomen.   Will check labs, abdominal series reevaluate need for admission versus discharge with previously planned outpatient scopes      Critical Care     none    Shelton Melvin MD, [facility]  Attending Emergency  Physician             Shelton Melvin MD  07/07/21 [phone]

## 2021-07-07 NOTE — ED TRIAGE NOTES
Pt in for possible endoscopy states that he was at [facility] today for scheduled Endoscopy with IV placed when they discharged him home and took out IV because their case load was too full, pt vomiting blood and having rectal bleeding.

## 2021-07-07 NOTE — PROGRESS NOTES
that portions of this note were completed with a voice recognition program.  Efforts were made to edit the dictations but occasionally words are mis-transcribed. )    Hema Freire MD, MD,   Attending Emergency Physician

## 2021-07-07 NOTE — ED PROVIDER NOTES
blood clots, Head injury, Headache, Insomnia, Juvenile rheumatoid arthritis ([facility] [address] Neuromuscular disorder (HCC), PFAPA syndrome ([facility] [address] PUD (peptic ulcer disease), Rheumatoid arthritis ([facility] [address] Rheumatoid arthritis(714.0), Sleep apnea, 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[facility] [address] Substance abuse ([facility] [address] Suicidal ideation, Suicide attempt by hanging ([facility] [address] Tobacco dependence, Ulcerative colitis ([facility] [address] and UTI (urinary tract infection). has a past surgical history that includes Colonoscopy; bronchoscopy; other surgical history; Upper gastrointestinal endoscopy (2/4/16); pr egd transoral biopsy single/multiple (N/A, 3/20/2017); sigmoidoscopy (N/A, 3/20/2017); Cholecystectomy, laparoscopic (07/14/2017); pr esophagogastroduodenoscopy transoral diagnostic (N/A, 8/9/2017); pr colonoscopy w/biopsy single/multiple (8/9/2017); Abdomen surgery; Upper gastrointestinal endoscopy (N/A, 6/13/2018); Upper gastrointestinal endoscopy (N/A, 9/20/2018); and Endoscopy, colon, diagnostic. Social History     Socioeconomic History    Marital status: Single     Spouse name: Not on file    Number of children: Not on file    Years of education: Not on file    Highest education level: Not on file   Occupational History    Occupation: disability   Tobacco Use    Smoking status: Current Every Day Smoker     Packs/day: 1.00     Years: 15.00     Pack years: 15.00     Types: Cigarettes    Smokeless tobacco: Never Used   Vaping Use    Vaping Use: Former   Substance and Sexual Activity    Alcohol use: Yes     Comment: social ETOH use    Drug use: Not Currently     Types: Opiates      Comment: last used pain pill/fentanyl 2/18/21    Sexual activity: Yes     Partners: Female     Comment: Lives alone, not working.    Other Topics Concern    Not on file   Social History Narrative    ** Merged History Encounter **          Social Determinants of Health     Financial Resource Strain: Unknown    Difficulty of Paying Living Expenses: Patient refused   Food Insecurity: Unknown    Worried About Running Out of Food in the Last Year: Patient refused   [address] in the Last Year: Patient refused   Transportation Needs:     Lack of Transportation (Medical):  Lack of Transportation (Non-Medical):    Physical Activity:     Days of Exercise per Week:     Minutes of Exercise per Session:    Stress:     Feeling of Stress :    Social Connections:     Frequency of Communication with Friends and Family:     Frequency of Social Gatherings with Friends and Family:     Attends Che[employer] Services:     Active Member of Clubs or Organizations:     Attends Club or Organization Meetings:     Marital Status:    Intimate Partner Violence:     Fear of Current or Ex-Partner:     Emotionally Abused:     Physically Abused:     Sexually Abused:        Family History   Problem Relation Age of Onset    Diabetes Father     Alcohol Abuse Father     Depression Father     Arthritis Father     High Blood Pressure Father     Other Father         aneurysm & epilepsy    Migraines Father     Arthritis Mother     Other Mother         aneurysm & epilepsy    Migraines Mother     Diabetes Brother         Aunt and uncles    Depression Brother     Mental Illness Brother     Other Brother         epilepsy    Migraines Brother     Stroke Other         Uncle    Other Brother         murdered Oct 6th, 2014    Colon Cancer Paternal Cousin 43    Other Sister         epilepsy   [employer] Migraines Sister        Allergies:  Dicyclomine, Famotidine, Geodon [ziprasidone hcl], Haloperidol, Iv dye [iodides], Ibuprofen, Aspirin, Dicyclomine hcl, Flagyl [metronidazole], Iodine, and Mirtazapine    Home Medications:  Prior to Admission medications    Medication Sig Start Date End Date Taking?  Authorizing Provider   diphenhydrAMINE (BENADRYL) 25 MG capsule Take 1 capsule by mouth every 6 hours as needed for Itching 7/7/21 7/17/21 Yes Lenore Culver MD   calcium carbonate (ANTACID) 500 MG chewable tablet Take 1 tablet by mouth daily 7/7/21 8/6/21 Yes Travis Stapleton MD   tiZANidine (ZANAFLEX) 4 MG tablet Take 1 tablet by mouth every 8 hours as needed (HEADACHES, BACK PAIN) 6/30/21   Noam Martinez MD   Multiple Vitamins-Minerals (THERAPEUTIC MULTIVITAMIN-MINERALS) tablet Take 1 tablet by mouth daily 6/30/21   Noam Martinez MD   QUEtiapine (SEROQUEL) 50 MG tablet Take 1 tablet by mouth 2 times daily at 0800 and 1400 6/30/21   Noam Martinez MD   QUEtiapine (SEROQUEL) 200 MG tablet Take 1 tablet by mouth nightly 6/30/21   Noam Martinez MD   ARIPiprazole Lauroxil (ARISTADA) 1064 MG/3.9ML PRSY injection Inject 3.9 mLs into the muscle Every 2 months for 1 dose 8/17/21 8/17/21  Noam Martinez MD   cloNIDine (CATAPRES) 0.1 MG tablet Take 1 tablet by mouth 3 times daily 6/30/21   Noam Martinez MD   fenofibrate (TRIGLIDE) 160 MG tablet Take 1 tablet by mouth daily 7/1/21   Noam Martinez MD   naloxone 4 MG/0.1ML LIQD nasal spray 1 spray by Nasal route as needed for Opioid Reversal 6/30/21   Noam Martinez MD   metFORMIN (GLUCOPHAGE) 500 MG tablet Take 1 tablet by mouth 2 times daily (with meals) 6/30/21   Noam Martinez MD   traZODone (DESYREL) 100 MG tablet Take 1 tablet by mouth nightly 6/30/21   Noam Martinez MD   DULoxetine (CYMBALTA) 30 MG extended release capsule Take 1 capsule by mouth daily 6/30/21   Noam Martinez MD   buprenorphine-naloxone (SUBOXONE) 12-3 MG sublingual film Place 1 Film under the tongue daily for 15 days.  6/30/21 7/15/21  Noam Martinez MD   meloxicam (MOBIC) 15 MG tablet Take 15 mg by mouth daily    Historical Provider, MD   pantoprazole (PROTONIX) 40 MG tablet Take 1 tablet by mouth every morning (before breakfast) 6/18/21   Hyman Spatz, MD   albuterol sulfate HFA (VENTOLIN HFA) 108 (90 Base) MCG/ACT inhaler Inhale 2 puffs into the lungs every 6 hours as needed for Wheezing  Patient taking differently: Inhale 2 puffs into the lungs every 6 hours as needed for Wheezing 10/11/19   Ari Gurrola MD       REVIEW OFSYSTEMS    (2-9 systems for level 4, 10 or more for level 5)      Review of Systems   Constitutional: Negative for chills, diaphoresis, fatigue and fever. HENT: Negative for rhinorrhea, sore throat, tinnitus and trouble swallowing. Eyes: Negative for visual disturbance. Respiratory: Negative for cough, chest tightness, shortness of breath and wheezing. Cardiovascular: Negative for chest pain and leg swelling. Gastrointestinal: Positive for blood in stool, nausea and vomiting (hematemesis ). Negative for abdominal distention, abdominal pain, constipation and diarrhea. Endocrine: Negative for polyuria. Genitourinary: Negative for dysuria, flank pain and frequency. Musculoskeletal: Negative for arthralgias, back pain, joint swelling and myalgias. Neurological: Negative for dizziness, tremors, seizures, weakness, light-headedness, numbness and headaches. PHYSICAL EXAM   (up to 7 for level 4, 8 or more forlevel 5)      INITIAL VITALS:   ED Triage Vitals   BP Temp Temp src Pulse Resp SpO2 Height Weight   -- -- -- -- -- -- -- --       Physical Exam  Constitutional:       General: He is not in acute distress. Appearance: He is not ill-appearing. HENT:      Head: Normocephalic and atraumatic. Right Ear: External ear normal.      Left Ear: External ear normal.   Eyes:      Extraocular Movements: Extraocular movements intact. Cardiovascular:      Rate and Rhythm: Normal rate and regular rhythm. Pulmonary:      Effort: Pulmonary effort is normal.   Abdominal:      General: Abdomen is flat. Musculoskeletal:         General: No deformity or signs of injury. Skin:     General: Skin is warm. Capillary Refill: Capillary refill takes less than 2 seconds. Neurological:      Mental Status: He is oriented to person, place, and time. Mental status is at baseline.    Psychiatric:         Mood and Affect: Mood normal.         DIFFERENTIAL DIAGNOSIS     PLAN (LABS / IMAGING / EKG):  Orders Placed This Encounter   Procedures    XR ACUTE ABD SERIES CHEST 1 VW    CBC WITH AUTO DIFFERENTIAL    COMPREHENSIVE METABOLIC PANEL    LIPASE       MEDICATIONS ORDERED:  Orders Placed This Encounter   Medications    AND Linked Order Group     pantoprazole (PROTONIX) injection 40 mg     sodium chloride (PF) 0.9 % injection 10 mL    promethazine (PHENERGAN) injection 25 mg    morphine injection 4 mg    diphenhydrAMINE (BENADRYL) 25 MG capsule     Sig: Take 1 capsule by mouth every 6 hours as needed for Itching     Dispense:  10 capsule     Refill:  0    calcium carbonate (ANTACID) 500 MG chewable tablet     Sig: Take 1 tablet by mouth daily     Dispense:  30 tablet     Refill:  0    DISCONTD: diphenhydrAMINE (BENADRYL) injection 12.5 mg       DDX: Gastric ulcer, soft varices, upper GI bleed, duodenal ulcer, melena, hematochezia, anemia    Initial MDM/Plan/ED COURSE:    27 y.o. male who presents with concerns for GI bleed. Patient has outpatient follow-up with gastroenterology with patient potentially undergoing scope tomorrow. Patient is hemodynamically stable with blood pressure of 152/91, pulse of 90, is not in acute distress, abdomen is soft, mildly tender at the epigastrium. Plan to assess for anemia in the setting of known GI bleed with CBC, plan to get an x-ray in order to assess for potential perforated viscus with free air under the abdomen, but need a CMP and a lipase and reassess for acute cause of epigastric pain including but not limited to pancreatitis, cholelithiasis, cholecystitis.     ED Course as of Jul 07 2223 Wed Jul 07, 2021   1806 Acute abdominal x-ray as read by 3rd year emergency medicine resident does not show free air under the diaphragm, does not show evidence of volvulus, intussusception    [GP]      ED Course User Index  [GP] Юлия Castillo MD   work-up generally negative for acute pathology, patient's hemoglobin just slightly decreased from previous, patient to follow-up with gastroenterology tomorrow morning. Patient given Protonix injection, given Phenergan for nausea, morphine for pain while in the emergency department, patient discharged with Benadryl as well as calcium carbonate as patient is allergic to Pepcid and other to give an antihistamine to decrease stomach acidity. Patient agreeable to discharge at this time. Has no questions at time of discharge.:     DIAGNOSTIC RESULTS / EMERGENCYDEPARTMENT COURSE / MDM     LABS:  Labs Reviewed   CBC WITH AUTO DIFFERENTIAL - Abnormal; Notable for the following components:       Result Value    RBC 4.12 (*)     Hemoglobin 12.3 (*)     Hematocrit 37.3 (*)     All other components within normal limits   COMPREHENSIVE METABOLIC PANEL - Abnormal; Notable for the following components:    Anion Gap 6 (*)     Total Bilirubin <0.10 (*)     All other components within normal limits   LIPASE           XR ACUTE ABD SERIES CHEST 1 VW    Result Date: 7/7/2021  EXAMINATION: TWO XRAY VIEWS OF THE ABDOMEN AND SINGLE  XRAY VIEW OF THE CHEST 7/7/2021 6:01 pm COMPARISON: 09/19/2020 and 10/24/2018 HISTORY: ORDERING SYSTEM PROVIDED HISTORY: assess for free air in abdomen TECHNOLOGIST PROVIDED HISTORY: assess for free air in abdomen Reason for Exam: vomitting blood,rectal bleeding Acuity: Unknown FINDINGS: Heart size and configuration are normal.  Hilar and mediastinal structures are unremarkable. The lungs are clear. No pneumothorax or pleural fluid. No acute bone finding. Moderate stool load in the rectosigmoid colon. Mild stool load throughout the rest of the colon. There are no abnormal calcifications or soft tissue masses. No free air. No acute bone finding. Moderate stool load in the rectosigmoid colon consistent with constipation. Mild stool load throughout the rest of the colon. Normal chest examination.        PROCEDURES:  None    CONSULTS:  None    CRITICAL CARE:  Please see attending note    FINAL IMPRESSION      1.  Abdominal pain, unspecified abdominal location          DISPOSITION / PLAN     DISPOSITION Decision To Discharge 07/07/2021 06:12:17 PM      PATIENT REFERRED TO:  [facility]  [address] [place] [address]  [phone]          DISCHARGE MEDICATIONS:  Discharge Medication List as of 7/7/2021  6:13 PM      START taking these medications    Details   diphenhydrAMINE (BENADRYL) 25 MG capsule Take 1 capsule by mouth every 6 hours as needed for Itching, Disp-10 capsule, R-0Print      calcium carbonate (ANTACID) 500 MG chewable tablet Take 1 tablet by mouth daily, Disp-30 tablet, R-0Print             César Brandon MD  Emergency Medicine Resident    (Please note that portions of this note were completed with a voice recognition program.Efforts were made to edit the dictations but occasionally words are mis-transcribed.)        César Brandon MD  Resident  07/07/21 2220

## 2021-07-07 NOTE — ED NOTES
Pt placed on BP cuff and pulse ox.  Rodger Vee at bedside     Jose La, [facility]  07/07/21 1702

## 2021-07-07 NOTE — ED NOTES
Pt back from [facility], [address] [place] [address]  07/07/21 3679

## 2021-07-21 ENCOUNTER — HOSPITAL ENCOUNTER (INPATIENT)
Age: 31
LOS: 5 days | Discharge: HOME | DRG: 885 | End: 2021-07-27
Attending: EMERGENCY MEDICINE | Admitting: PSYCHIATRY & NEUROLOGY
Payer: MEDICARE

## 2021-07-21 ENCOUNTER — HOSPITAL ENCOUNTER (EMERGENCY)
Age: 31
Discharge: HOME | End: 2021-07-21
Payer: MEDICARE

## 2021-07-21 VITALS
HEIGHT: 69 IN | WEIGHT: 230 LBS | SYSTOLIC BLOOD PRESSURE: 145 MMHG | BODY MASS INDEX: 34.07 KG/M2 | OXYGEN SATURATION: 96 % | HEART RATE: 105 BPM | TEMPERATURE: 97 F | DIASTOLIC BLOOD PRESSURE: 88 MMHG

## 2021-07-21 DIAGNOSIS — T50.902A INTENTIONAL DRUG OVERDOSE, INITIAL ENCOUNTER (HCC): Primary | ICD-10-CM

## 2021-07-21 DIAGNOSIS — F11.10 OPIOID ABUSE (HCC): ICD-10-CM

## 2021-07-21 DIAGNOSIS — R45.851 SUICIDAL IDEATION: ICD-10-CM

## 2021-07-21 LAB
ABSOLUTE EOS #: 0.1 K/UL (ref 0–0.4)
ABSOLUTE IMMATURE GRANULOCYTE: ABNORMAL K/UL (ref 0–0.3)
ABSOLUTE LYMPH #: 1.8 K/UL (ref 1–4.8)
ABSOLUTE MONO #: 0.5 K/UL (ref 0.1–1.3)
ACETAMINOPHEN LEVEL: <5 UG/ML (ref 10–30)
ALBUMIN SERPL-MCNC: 4.3 G/DL (ref 3.5–5.2)
ALBUMIN/GLOBULIN RATIO: ABNORMAL (ref 1–2.5)
ALP BLD-CCNC: 68 U/L (ref 40–129)
ALT SERPL-CCNC: 21 U/L (ref 5–41)
ANION GAP SERPL CALCULATED.3IONS-SCNC: 12 MMOL/L (ref 9–17)
AST SERPL-CCNC: 30 U/L
BASOPHILS # BLD: 0 % (ref 0–2)
BASOPHILS ABSOLUTE: 0 K/UL (ref 0–0.2)
BILIRUB SERPL-MCNC: 0.27 MG/DL (ref 0.3–1.2)
BUN BLDV-MCNC: 9 MG/DL (ref 6–20)
BUN/CREAT BLD: ABNORMAL (ref 9–20)
CALCIUM SERPL-MCNC: 9.7 MG/DL (ref 8.6–10.4)
CHLORIDE BLD-SCNC: 101 MMOL/L (ref 98–107)
CO2: 26 MMOL/L (ref 20–31)
CREAT SERPL-MCNC: 0.62 MG/DL (ref 0.7–1.2)
DIFFERENTIAL TYPE: ABNORMAL
EOSINOPHILS RELATIVE PERCENT: 1 % (ref 0–4)
ETHANOL PERCENT: <0.01 %
ETHANOL: <10 MG/DL
GFR AFRICAN AMERICAN: >60 ML/MIN
GFR NON-AFRICAN AMERICAN: >60 ML/MIN
GFR SERPL CREATININE-BSD FRML MDRD: ABNORMAL ML/MIN/{1.73_M2}
GFR SERPL CREATININE-BSD FRML MDRD: ABNORMAL ML/MIN/{1.73_M2}
GLUCOSE BLD-MCNC: 105 MG/DL (ref 70–99)
HCT VFR BLD CALC: 38.4 % (ref 41–53)
HEMOGLOBIN: 13 G/DL (ref 13.5–17.5)
IMMATURE GRANULOCYTES: ABNORMAL %
LYMPHOCYTES # BLD: 27 % (ref 24–44)
MCH RBC QN AUTO: 30.2 PG (ref 26–34)
MCHC RBC AUTO-ENTMCNC: 33.9 G/DL (ref 31–37)
MCV RBC AUTO: 89 FL (ref 80–100)
MONOCYTES # BLD: 7 % (ref 1–7)
NRBC AUTOMATED: ABNORMAL PER 100 WBC
PDW BLD-RTO: 14.3 % (ref 11.5–14.9)
PLATELET # BLD: 280 K/UL (ref 150–450)
PLATELET ESTIMATE: ABNORMAL
PMV BLD AUTO: 7.8 FL (ref 6–12)
POTASSIUM SERPL-SCNC: 3.7 MMOL/L (ref 3.7–5.3)
RBC # BLD: 4.32 M/UL (ref 4.5–5.9)
RBC # BLD: ABNORMAL 10*6/UL
SALICYLATE LEVEL: <1 MG/DL (ref 3–10)
SARS-COV-2, RAPID: NOT DETECTED
SEG NEUTROPHILS: 65 % (ref 36–66)
SEGMENTED NEUTROPHILS ABSOLUTE COUNT: 4.2 K/UL (ref 1.3–9.1)
SODIUM BLD-SCNC: 139 MMOL/L (ref 135–144)
SPECIMEN DESCRIPTION: NORMAL
TOTAL PROTEIN: 7.7 G/DL (ref 6.4–8.3)
WBC # BLD: 6.6 K/UL (ref 3.5–11)
WBC # BLD: ABNORMAL 10*3/UL

## 2021-07-21 PROCEDURE — 80053 COMPREHEN METABOLIC PANEL: CPT

## 2021-07-21 PROCEDURE — 80307 DRUG TEST PRSMV CHEM ANLYZR: CPT

## 2021-07-21 PROCEDURE — 2580000003 HC RX 258: Performed by: EMERGENCY MEDICINE

## 2021-07-21 PROCEDURE — G0480 DRUG TEST DEF 1-7 CLASSES: HCPCS

## 2021-07-21 PROCEDURE — 99285 EMERGENCY DEPT VISIT HI MDM: CPT

## 2021-07-21 PROCEDURE — 85025 COMPLETE CBC W/AUTO DIFF WBC: CPT

## 2021-07-21 PROCEDURE — 87635 SARS-COV-2 COVID-19 AMP PRB: CPT

## 2021-07-21 PROCEDURE — 36415 COLL VENOUS BLD VENIPUNCTURE: CPT

## 2021-07-21 PROCEDURE — 93005 ELECTROCARDIOGRAM TRACING: CPT | Performed by: EMERGENCY MEDICINE

## 2021-07-21 PROCEDURE — 80143 DRUG ASSAY ACETAMINOPHEN: CPT

## 2021-07-21 PROCEDURE — 80179 DRUG ASSAY SALICYLATE: CPT

## 2021-07-21 RX ORDER — 0.9 % SODIUM CHLORIDE 0.9 %
1000 INTRAVENOUS SOLUTION INTRAVENOUS ONCE
Status: COMPLETED | OUTPATIENT
Start: 2021-07-21 | End: 2021-07-21

## 2021-07-21 RX ADMIN — SODIUM CHLORIDE 1000 ML: 9 INJECTION, SOLUTION INTRAVENOUS at 21:14

## 2021-07-21 ASSESSMENT — PAIN SCALES - GENERAL: PAINLEVEL_OUTOF10: 9

## 2021-07-21 ASSESSMENT — ENCOUNTER SYMPTOMS
BACK PAIN: 0
DIARRHEA: 0
SHORTNESS OF BREATH: 0
ABDOMINAL PAIN: 1
COUGH: 0
VOMITING: 0

## 2021-07-21 ASSESSMENT — PAIN DESCRIPTION - PAIN TYPE: TYPE: ACUTE PAIN

## 2021-07-21 ASSESSMENT — PAIN DESCRIPTION - LOCATION: LOCATION: ABDOMEN

## 2021-07-22 PROBLEM — R45.851 DEPRESSION WITH SUICIDAL IDEATION: Status: ACTIVE | Noted: 2021-07-22

## 2021-07-22 PROBLEM — F32.A DEPRESSION WITH SUICIDAL IDEATION: Status: ACTIVE | Noted: 2021-07-22

## 2021-07-22 LAB
AMPHETAMINE SCREEN URINE: NEGATIVE
BARBITURATE SCREEN URINE: NEGATIVE
BENZODIAZEPINE SCREEN, URINE: NEGATIVE
BUPRENORPHINE URINE: NORMAL
CANNABINOID SCREEN URINE: NEGATIVE
COCAINE METABOLITE, URINE: NEGATIVE
EKG ATRIAL RATE: 82 BPM
EKG P AXIS: 46 DEGREES
EKG P-R INTERVAL: 148 MS
EKG Q-T INTERVAL: 362 MS
EKG QRS DURATION: 92 MS
EKG QTC CALCULATION (BAZETT): 422 MS
EKG R AXIS: 51 DEGREES
EKG T AXIS: 46 DEGREES
EKG VENTRICULAR RATE: 82 BPM
MDMA URINE: NORMAL
METHADONE SCREEN, URINE: NEGATIVE
METHAMPHETAMINE, URINE: NORMAL
OPIATES, URINE: NEGATIVE
OXYCODONE SCREEN URINE: NEGATIVE
PHENCYCLIDINE, URINE: NEGATIVE
PROPOXYPHENE, URINE: NORMAL
TEST INFORMATION: NORMAL
TRICYCLIC ANTIDEPRESSANTS, UR: NORMAL

## 2021-07-22 PROCEDURE — 1240000000 HC EMOTIONAL WELLNESS R&B

## 2021-07-22 PROCEDURE — 93010 ELECTROCARDIOGRAM REPORT: CPT | Performed by: INTERNAL MEDICINE

## 2021-07-22 PROCEDURE — 6370000000 HC RX 637 (ALT 250 FOR IP): Performed by: PSYCHIATRY & NEUROLOGY

## 2021-07-22 PROCEDURE — 80307 DRUG TEST PRSMV CHEM ANLYZR: CPT

## 2021-07-22 PROCEDURE — APPSS60 APP SPLIT SHARED TIME 46-60 MINUTES: Performed by: NURSE PRACTITIONER

## 2021-07-22 PROCEDURE — 6370000000 HC RX 637 (ALT 250 FOR IP): Performed by: NURSE PRACTITIONER

## 2021-07-22 PROCEDURE — 99223 1ST HOSP IP/OBS HIGH 75: CPT | Performed by: PSYCHIATRY & NEUROLOGY

## 2021-07-22 RX ORDER — QUETIAPINE FUMARATE 50 MG/1
50 TABLET, FILM COATED ORAL 2 TIMES DAILY
Status: DISCONTINUED | OUTPATIENT
Start: 2021-07-22 | End: 2021-07-22

## 2021-07-22 RX ORDER — TRAZODONE HYDROCHLORIDE 50 MG/1
50 TABLET ORAL NIGHTLY PRN
Status: DISCONTINUED | OUTPATIENT
Start: 2021-07-22 | End: 2021-07-22

## 2021-07-22 RX ORDER — POLYETHYLENE GLYCOL 3350 17 G/17G
17 POWDER, FOR SOLUTION ORAL DAILY PRN
Status: DISCONTINUED | OUTPATIENT
Start: 2021-07-22 | End: 2021-07-27 | Stop reason: HOSPADM

## 2021-07-22 RX ORDER — QUETIAPINE FUMARATE 200 MG/1
200 TABLET, FILM COATED ORAL NIGHTLY
Status: DISCONTINUED | OUTPATIENT
Start: 2021-07-22 | End: 2021-07-22

## 2021-07-22 RX ORDER — BUPRENORPHINE AND NALOXONE 8; 2 MG/1; MG/1
1 FILM, SOLUBLE BUCCAL; SUBLINGUAL DAILY
Status: DISCONTINUED | OUTPATIENT
Start: 2021-07-22 | End: 2021-07-27 | Stop reason: HOSPADM

## 2021-07-22 RX ORDER — CLONIDINE HYDROCHLORIDE 0.1 MG/1
0.1 TABLET ORAL 3 TIMES DAILY PRN
Status: DISCONTINUED | OUTPATIENT
Start: 2021-07-22 | End: 2021-07-27 | Stop reason: HOSPADM

## 2021-07-22 RX ORDER — MELOXICAM 15 MG/1
15 TABLET ORAL DAILY
Status: ON HOLD | COMMUNITY
End: 2021-07-26 | Stop reason: HOSPADM

## 2021-07-22 RX ORDER — DULOXETIN HYDROCHLORIDE 30 MG/1
30 CAPSULE, DELAYED RELEASE ORAL DAILY
Status: DISCONTINUED | OUTPATIENT
Start: 2021-07-22 | End: 2021-07-27 | Stop reason: HOSPADM

## 2021-07-22 RX ORDER — ONDANSETRON 4 MG/1
4 TABLET, FILM COATED ORAL EVERY 8 HOURS PRN
Status: DISCONTINUED | OUTPATIENT
Start: 2021-07-22 | End: 2021-07-27 | Stop reason: HOSPADM

## 2021-07-22 RX ORDER — TRAZODONE HYDROCHLORIDE 100 MG/1
100 TABLET ORAL NIGHTLY
Status: DISCONTINUED | OUTPATIENT
Start: 2021-07-22 | End: 2021-07-24

## 2021-07-22 RX ORDER — GABAPENTIN 300 MG/1
600 CAPSULE ORAL 3 TIMES DAILY
Status: DISCONTINUED | OUTPATIENT
Start: 2021-07-22 | End: 2021-07-27 | Stop reason: HOSPADM

## 2021-07-22 RX ORDER — HYDROXYZINE 50 MG/1
50 TABLET, FILM COATED ORAL 3 TIMES DAILY PRN
Status: DISCONTINUED | OUTPATIENT
Start: 2021-07-22 | End: 2021-07-27 | Stop reason: HOSPADM

## 2021-07-22 RX ORDER — ACETAMINOPHEN 325 MG/1
650 TABLET ORAL EVERY 4 HOURS PRN
Status: DISCONTINUED | OUTPATIENT
Start: 2021-07-22 | End: 2021-07-27 | Stop reason: HOSPADM

## 2021-07-22 RX ORDER — M-VIT,TX,IRON,MINS/CALC/FOLIC 27MG-0.4MG
1 TABLET ORAL DAILY
Status: DISCONTINUED | OUTPATIENT
Start: 2021-07-22 | End: 2021-07-27 | Stop reason: HOSPADM

## 2021-07-22 RX ORDER — ALBUTEROL SULFATE 90 UG/1
2 AEROSOL, METERED RESPIRATORY (INHALATION) EVERY 6 HOURS PRN
Status: ON HOLD | COMMUNITY
End: 2021-07-26 | Stop reason: HOSPADM

## 2021-07-22 RX ORDER — FENOFIBRATE 160 MG/1
160 TABLET ORAL DAILY
Refills: 3 | Status: DISCONTINUED | OUTPATIENT
Start: 2021-07-22 | End: 2021-07-27 | Stop reason: HOSPADM

## 2021-07-22 RX ORDER — NICOTINE 21 MG/24HR
1 PATCH, TRANSDERMAL 24 HOURS TRANSDERMAL DAILY
Status: DISCONTINUED | OUTPATIENT
Start: 2021-07-22 | End: 2021-07-23

## 2021-07-22 RX ORDER — ARIPIPRAZOLE 15 MG/1
15 TABLET ORAL DAILY
Status: DISCONTINUED | OUTPATIENT
Start: 2021-07-22 | End: 2021-07-27 | Stop reason: HOSPADM

## 2021-07-22 RX ORDER — PANTOPRAZOLE SODIUM 40 MG/1
40 TABLET, DELAYED RELEASE ORAL
Status: DISCONTINUED | OUTPATIENT
Start: 2021-07-22 | End: 2021-07-27 | Stop reason: HOSPADM

## 2021-07-22 RX ADMIN — METFORMIN HYDROCHLORIDE 500 MG: 500 TABLET ORAL at 16:18

## 2021-07-22 RX ADMIN — ACETAMINOPHEN 650 MG: 325 TABLET ORAL at 16:35

## 2021-07-22 RX ADMIN — FENOFIBRATE 160 MG: 160 TABLET ORAL at 08:05

## 2021-07-22 RX ADMIN — DULOXETINE 30 MG: 30 CAPSULE, DELAYED RELEASE ORAL at 08:07

## 2021-07-22 RX ADMIN — TRAZODONE HYDROCHLORIDE 100 MG: 100 TABLET ORAL at 20:42

## 2021-07-22 RX ADMIN — GABAPENTIN 600 MG: 300 CAPSULE ORAL at 20:42

## 2021-07-22 RX ADMIN — PANTOPRAZOLE SODIUM 40 MG: 40 TABLET, DELAYED RELEASE ORAL at 08:04

## 2021-07-22 RX ADMIN — CLONIDINE HYDROCHLORIDE 0.1 MG: 0.1 TABLET ORAL at 16:18

## 2021-07-22 RX ADMIN — CLONIDINE HYDROCHLORIDE 0.1 MG: 0.1 TABLET ORAL at 09:56

## 2021-07-22 RX ADMIN — HYDROXYZINE HYDROCHLORIDE 50 MG: 50 TABLET, FILM COATED ORAL at 19:15

## 2021-07-22 RX ADMIN — METFORMIN HYDROCHLORIDE 500 MG: 500 TABLET ORAL at 08:04

## 2021-07-22 RX ADMIN — GABAPENTIN 600 MG: 300 CAPSULE ORAL at 13:54

## 2021-07-22 RX ADMIN — QUETIAPINE FUMARATE 50 MG: 50 TABLET ORAL at 13:54

## 2021-07-22 RX ADMIN — QUETIAPINE FUMARATE 50 MG: 50 TABLET ORAL at 08:05

## 2021-07-22 RX ADMIN — BUPRENORPHINE AND NALOXONE 1 FILM: 8; 2 FILM BUCCAL; SUBLINGUAL at 11:37

## 2021-07-22 RX ADMIN — Medication 1 TABLET: at 08:04

## 2021-07-22 RX ADMIN — ARIPIPRAZOLE 15 MG: 15 TABLET ORAL at 17:27

## 2021-07-22 ASSESSMENT — PAIN SCALES - GENERAL
PAINLEVEL_OUTOF10: 10
PAINLEVEL_OUTOF10: 3
PAINLEVEL_OUTOF10: 0
PAINLEVEL_OUTOF10: 5

## 2021-07-22 ASSESSMENT — PAIN DESCRIPTION - LOCATION: LOCATION: THROAT

## 2021-07-22 ASSESSMENT — SLEEP AND FATIGUE QUESTIONNAIRES
DIFFICULTY ARISING: NO
DO YOU HAVE DIFFICULTY SLEEPING: YES
AVERAGE NUMBER OF SLEEP HOURS: 4
DO YOU USE A SLEEP AID: NO
DIFFICULTY STAYING ASLEEP: YES
DIFFICULTY FALLING ASLEEP: YES
RESTFUL SLEEP: NO
SLEEP PATTERN: RESTLESSNESS;DISTURBED/INTERRUPTED SLEEP

## 2021-07-22 ASSESSMENT — PATIENT HEALTH QUESTIONNAIRE - PHQ9: SUM OF ALL RESPONSES TO PHQ QUESTIONS 1-9: 11

## 2021-07-22 ASSESSMENT — LIFESTYLE VARIABLES: HISTORY_ALCOHOL_USE: YES

## 2021-07-22 ASSESSMENT — PAIN DESCRIPTION - PAIN TYPE: TYPE: ACUTE PAIN

## 2021-07-22 NOTE — ED TRIAGE NOTES
Pt into ER with c/c suicide ideation with plan and attempt. Pt states that he took Pepcid knowing he was allergic to it in an attempt to kill himself. Pt states that he has been depressed over there death of his brother.   Pt has had suicide attempts in the past.

## 2021-07-22 NOTE — ED NOTES
Provisional Diagnosis:     Patient presented to ED via self after an intentional overdose    Psychosocial and Contextual Factors:     Patient has significant history of depression with SI.    C-SSRS Summary:    Patient took approximately 20 pepcid knowing he is allergic in an attempt to kill self. Patient: X  Family:   Agency:     Substance Abuse:  Denies, but per UDS, opiates and benzos    Present Suicidal Behavior:    Patient took approximately 20 pepcid knowing he is allergic in an attempt to kill self. Verbal: X    Attempt: X    Past Suicidal Behavior:   Patient has history of suicide attempts    Verbal: X    Attempt: X    Self-Injurious/Self-Mutilation:  Patient denies    Trauma Identified:    Patient lost brother in 2014    Protective Factors:    Patient has stable housing. Patient has insurance. Risk Factors:    Patient has history of noncompliance with medications. Patient does not follow through with outpatient treatment. Patient has history of drug abuse    Clinical Summary:    Patient is a 27year old [facility] male who presented to ED via self after an intentional overdose. Patient stated he took approximately 20 pepcid knowing he is allergic in an attempt to kill self. Patient has history of suicide attempts. Patient has history of noncompliance with medications. Patient does not follow through with outpatient treatment. Patient has history of drug abuse. Patient denies hallucinations at this time. Patient denies HI. Level of Care Disposition: This writer consulted with Jannell Cogan, NP, who recommended inpatient hospitalization for safety and stabilization. Patient signed application for voluntary admission to [facility].

## 2021-07-22 NOTE — BH NOTE
Patient was asked to provide a urine sample at their earliest convenience. Patient was provided urine specimen cup at this time.

## 2021-07-22 NOTE — H&P
Department of Psychiatry  Attending Physician Psychiatric Assessment     Reason for Admission to Psychiatric Unit:    Threat to self requiring 24 hour professional observation  A mental disorder causing major disability in social, interpersonal, occupational, and/or educational functioning that is leading to dangerous or life-threatening functioning, and that can only be addressed in an acute inpatient setting   Concerns about patient's safety in the community    CHIEF COMPLAINT: Depression with suicidal ideation    History obtained from: Patient, electronic medical record          HISTORY OF PRESENT ILLNESS:    Gonzalo Jacobs is a 27 y.o. male who has a past medical history of asthma, bipolar disorder, COPD, depression, GERD, PUD, sleep apnea, ulcerative colitis. Patient presented to the [facility] ED with suicidal ideation. According to ED documentation: The patient states that he was thinking about his brother who was killed in 2014 and he took a half a bottle of Pepcid 20 mg around 20 pills around 8 PM in an attempt to kill himself the patient states that he started cut himself but it hurt and so he decided to take the Pepcid route. Patient states that he has a history of allergy to Pepcid where he gets hives and his throat feels like he is it is closing he says that he does not have hives but his hands are red and he feels like his tongue is swelling. States that he has abdominal pain as well without any nausea or vomiting. He reports noncompliance with his psychiatric medications states that he never followed up with [facility]. De Barnett is interviewed today bedside, he endorses anhedonia, low mood for greater than 2 weeks, poor sleep, decreased interest in enjoyable activities, feelings of worthlessness, poor energy, poor focus and concentration, decreased appetite, feelings of hopelessness and suicidal ideation. He reports a plan to overdose on pills.   He reports a history of suicide attempt at age 13 where he tried to hang himself. At present he is denying any symptoms of elizabeth or psychosis. He is endorsing anxiety and feels that it is just as bad as his depression. He endorses panic attacks that cause shortness of breath, chest pain and sweating. He also endorses nightmares from PTSD. He reports that he ran out of his medications approximately 1 week ago. He reports that the last few days he has had increased nausea. He denies any alcohol, marijuana or illicit drug use. He reports that 1 week ago he did follow-up with [facility] to get his Suboxone and reports that he got a 1 week supply. He states that his last dose of Suboxone was today. This is contrary to his statement that he did not follow-up with [facility] and ran out of his psychotropic medication. He is aware that he will have to provide a urine specimen prior to receiving any type of Suboxone treatment. His last admission to [facility] was June 27 through June 30, 2021.            History of head trauma: [] Yes [x] No    History of seizures: [] Yes [x] No    History of violence or aggression: [x] Yes [] No         PSYCHIATRIC HISTORY:  [x] Yes [] No    Currently follows with [facility], he failed to follow-up after his last discharge from [facility]  1 previous lifetime suicide attempts  Multiple psychiatric hospital admissions    Past psychiatric medications includes: Neurontin, Cymbalta, trazodone, Seroquel, clonidine, Suboxone, Abilify, Aristada, methadone, Wellbutrin, Zyprexa, Haldol, Geodon, Remeron    Adverse reactions from psychotropic medications: [x] Yes [] No  Haldol- throat swelling  Geodon- anaphylaxis   Allergy to Remeron          Lifetime Psychiatric Review of Systems         Depression: Endorses     Anxiety: Endorses     Panic Attacks: Endorses     Elizabeth or Hypomania: Denies, documented history of     Phobias: Denies     Obsessions and Compulsions: Denies     Body or Vocal Tics: Denies     Visual Hallucinations: Denies     Auditory Hallucinations: Denies     Delusions/Paranoia: Endorses history of     PTSD: Endorses    Past Medical History:        Diagnosis Date    Anxiety     Arthritis     Asthma     Bipolar I disorder, most recent episode (or current) depressed, unspecified 9/12/2014    Clostridium difficile infection     COPD (chronic obstructive pulmonary disease) ([facility]     Depression     Disease of blood and blood forming organ     Eczema     Fracture, metacarpal     R 4th and 5th    Gastric ulcer     Gastritis 06/13/2018    GERD (gastroesophageal reflux disease)     GI bleed     H. 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[address]     Substance abuse ([facility]     Suicidal ideation     Suicide attempt by hanging ([facility]     Tobacco dependence     Ulcerative colitis ([facility]     UTI (urinary tract infection)        Past Surgical History:        Procedure Laterality Date    ABDOMEN SURGERY      upper GI scope 7/7/2015    BRONCHOSCOPY      CHOLECYSTECTOMY, LAPAROSCOPIC  07/14/2017    surgery performed at [address] [place] [address], COLON, DIAGNOSTIC      OTHER SURGICAL HISTORY      lumbar puncture    MI COLONOSCOPY W/BIOPSY SINGLE/MULTIPLE  8/9/2017    COLONOSCOPY WITH BIOPSY performed by Jeanine Haro MD at [facility]    MI EGD TRANSORAL BIOPSY SINGLE/MULTIPLE N/A 3/20/2017    EGD BIOPSY performed by Ousmane Nuñez MD at [facility]    MI ESOPHAGOGASTRODUODENOSCOPY TRANSORAL DIAGNOSTIC N/A 8/9/2017    EGD ESOPHAGOGASTRODUODENOSCOPY performed by Jeanine Haro MD at [address] [place] [address] N/A 3/20/2017    SIGMOIDOSCOPY DIAGNOSTIC FLEXIBLE performed by Ousmane Nuñez MD at [facility]    UPPER GASTROINTESTINAL ENDOSCOPY  2/4/16    UPPER GASTROINTESTINAL ENDOSCOPY N/A 6/13/2018    GASTRITIS    UPPER GASTROINTESTINAL ENDOSCOPY N/A 9/20/2018    EGD BIOPSY performed by Lizbeth Ordonez MD at [address] OR       Allergies:  Dicyclomine, Famotidine, Geodon [ziprasidone hcl], Haloperidol, Iv dye [iodides], Ibuprofen, Aspirin, Dicyclomine hcl, Flagyl [metronidazole], Iodine, and Mirtazapine         Social History:     [address]  · RESIDENCE:  Currently lives in [facility] with his brother  · LEVEL OF EDUCATION:  GED, reports that he is going to school for \"criminal justice\"  · MARITAL STATUS:Single  · CHILDREN: None  · OCCUPATION: On disabilify for mental health and physical issues. Unemployed. In the process of looking for a job. · PATIENT ASSETS: family supportive         DRUG USE HISTORY  Social History     Tobacco Use   Smoking Status Current Every Day Smoker    Packs/day: 1.00    Years: 15.00    Pack years: 15.00    Types: Cigarettes   Smokeless Tobacco Never Used     Social History     Substance and Sexual Activity   Alcohol Use Not Currently    Comment: social ETOH use     Social History     Substance and Sexual Activity   Drug Use Not Currently    Types: Opiates     Comment: last used pain pill/fentanyl 2/18/21       Endorses history of opiate use. Reports that he is currently on Suboxone. Denies any alcohol or marijuana use. UDS ordered, none obtained prior to admission.          LEGAL HISTORY:   HISTORY OF INCARCERATION: [] Yes [x] No    Family History:       Problem Relation Age of Onset    Diabetes Father     Alcohol Abuse Father     Depression Father     Arthritis Father     High Blood Pressure Father     Other Father         aneurysm & epilepsy    Migraines Father     Arthritis Mother     Other Mother         aneurysm & epilepsy   [facility] Migraines Mother     Diabetes Brother         Aunt and uncles    Depression Brother     Mental Illness Brother     Other Brother         epilepsy    Migraines Brother     Stroke Other         Uncle    Other Brother         murdered Oct 6th, 2014    Colon Cancer Paternal Cousin 43    Other Sister         epilepsy    Migraines Sister        Psychiatric Family History  Brother and father depression  Denies suicides in family  Father alcohol use in family         PHYSICAL EXAM:  Vitals:  /81   Pulse 83   Temp 98.1 °F (36.7 °C) (Oral)   Resp 14   Ht 5' 9\" (1.753 m)   Wt 227 lb (103 kg)   SpO2 96%   BMI 33.52 kg/m²     LABS:  Labs reviewed: [x] Yes  Last EKG in EMR reviewed: [x] Yes, last EKG on 6/13/2021 QTC = 434          Review of Systems   Constitutional: Negative for chills and weight loss. HENT: Negative for ear pain and nosebleeds. Eyes: Negative for blurred vision and photophobia. Respiratory: Negative for cough, shortness of breath and wheezing. Cardiovascular: Negative for chest pain and palpitations. Gastrointestinal: Positive for nausea. Negative for abdominal pain, diarrhea and vomiting. Genitourinary: Negative for dysuria and urgency. Musculoskeletal: Negative for falls and joint pain. Skin: Negative for itching and rash. Neurological: Negative for tremors, seizures and weakness. Endo/Heme/Allergies: Does not bruise/bleed easily. Physical Exam:   Constitutional:  Appears well-developed and well-nourished, no acute distress. HENT:   Head: Normocephalic and atraumatic. Eyes: Conjunctivae are normal. Right eye exhibits no discharge. Left eye exhibits no discharge. No scleral icterus. Neck: Normal range of motion. Neck supple. Pulmonary/Chest:  No respiratory distress or accessory muscle use, no wheezing. Cardiac: Regular rate and rhythm. Abdominal: Soft. Non-tender. Exhibits no distension. Musculoskeletal: Normal range of motion. Exhibits no edema. Neurological: cranial nerves II-XII grossly in tact, normal gait and station. Skin: Skin is warm and dry.  Patient is not diaphoretic. No erythema. Mental Status Examination:    Level of consciousness:  Resting, easily arousable to verbal stimulus  Appearance:  Appropriate attire, resting in bed, fair grooming   Behavior/Motor: Approachable, no psychomotor abnormalities noted  Attitude toward examiner:  Cooperative, attentive, fair eye contact  Speech: Normal rate, volume, and tone. Mood: Depressed  Affect:  Mood congruent  Thought processes:   Linear and coherent  Thought content: Active suicidal ideations, with a  current plan or intent, contracts for safety on the unit.                Denies homicidal ideations               Denies hallucinations              Denies delusions              Denies paranoia  Cognition:  Oriented to self, location, time, situation  Concentration: Clinically adequate  Memory: Intact  Insight &Judgment: Poor         DSM-5 Diagnosis    Principal Problem: Schizoaffective disorder, bipolar type (HCC)    Psychosocial and Contextual factors:  Financial   Occupational   Relationship   Legal   Living situation   Educational     Past Medical History:   Diagnosis Date    Anxiety     Arthritis     Asthma     Bipolar I disorder, most recent episode (or current) depressed, unspecified 9/12/2014    Clostridium difficile infection     COPD (chronic obstructive pulmonary disease) ([address]     Depression     Disease of blood and blood forming organ     Eczema     Fracture, metacarpal     R 4th and 5th    Gastric ulcer     Gastritis 06/13/2018    GERD (gastroesophageal reflux disease)     GI bleed     H. 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 Home medications reviewed. Cymbalta, Seroquel and trazodone restarted. Pharmacy will need to verify last dose of Aristada. Zofran and clonidine as needed were ordered for any potential withdrawal symptoms, it is unclear if patient has actually been taking Suboxone. Monitor need and frequency of PRN medications. Attempt to develop insight. Follow-up daily while inpatient. Reviewed risks and benefits as well as potential side effects with patient. CONSULTS [] Yes [x] No      Risk Management: close watch per standard protocol      Psychotherapy: participation in milieu and group and individual sessions with Attending Physician,  and Physician Assistant/CNP      Estimated length of stay:  2-14 days      GENERAL PATIENT/FAMILY EDUCATION  Patient will understand basic signs and symptoms, patient will understand benefits/risks and potential side effects from proposed medications, and patient will understand their role in recovery. Family is minimally active in patient's care. Patient assets that may be helpful during treatment include: Intent to participate and engage in treatment, sufficient fund of knowledge and intellect to understand and utilize treatments. Time spent: 60 minutes in face-to-face, review of records, and discussion of treatment plan. Patient's prescription fill history is consistent with his use of Suboxone. Urine tox screen was unremarkable. We will resume Suboxone for now. We start Cymbalta. Awaiting verification of Abilify long-acting however in the meantime we will stop Seroquel and start Abilify 15 mg.  Goals:    1) Remission of suicidal ideation. 2) Stabilization of symptoms prior to discharge. 3) Establish efficacy and tolerability of medications.          [employer] Certification     Admission Day 1  I certify that this patient's inpatient psychiatric hospital admission is medically necessary for:    x (1) treatment which could reasonably be expected to improve this patient's condition, or    x (2) diagnostic study or its equivalent. Time Spent: 60 minutes    Gonzalo Jacobs is a 27 y.o. male being evaluated face to face    --MASSIEL Gong CNP on 7/22/2021 at 6:51 AM    An electronic signature was used to authenticate this note. I independently saw and evaluated the patient. I reviewed the nurse practitioners documentation above. Any additional comments or changes to the nurse practitioners documentation are stated below otherwise agree with assessment. Plan will be as follows: Unclear to me at this time that there is documentation that the patient has failed monotherapy. He is requesting Abilify and states this worked well for him. I will discontinue Seroquel and resume Abilify at 15 mg and likely resume long-acting injectable. We will also try to review records to see if there is documentation of failure of 3 or more monotherapy so his complaints makes this a difficult assessment. Time spent with patient was 60 minutes in face-to-face, review of records and discussion of treatment plan.     Electronically signed by Elva Haro MD on 7/22/2021 at 4:10 PM

## 2021-07-22 NOTE — GROUP NOTE
Group Therapy Note    Date: 7/22/2021    Group Start Time: 1330  Group End Time: 1400  Group Topic: Psychoeducation    [facility]    Nusrat Bledsoe        Group Therapy Note    Attendees: 6/15         Patient's Goal:  To increase patient knowledge of community resources    Notes:  Patient was pleasant and appropriate throughout the session     Status After Intervention:  Improved    Participation Level:  Active Listener and Interactive    Participation Quality: Appropriate, Attentive, Sharing and Supportive      Speech:  normal      Thought Process/Content: Logical      Affective Functioning: Congruent      Mood: euthymic      Level of consciousness:  Alert, Oriented x4 and Attentive      Response to Learning: Able to verbalize current knowledge/experience, Able to verbalize/acknowledge new learning, Capable of insight and Progressing to goal      Endings: None Reported    Modes of Intervention: Education, Support, Socialization, Exploration, Clarifying and Problem-solving      Discipline Responsible: Psychoeducational Specialist      Signature:  Saintclair Kelly

## 2021-07-22 NOTE — BH NOTE
Patient given tobacco quitline number 15994857738 at this time, refusing to call at this time, states \" I just dont want to quit now\"- patient given information as to the dangers of long term tobacco use. Continue to reinforce the importance of tobacco cessation.

## 2021-07-22 NOTE — ED NOTES
Pt states that he took half a bottle of prescription strength Pepcid in an attempt to kill himself around 8pm tonight because he misses his brother and \"his life sucks\". Pt states he knows he is allergic to Pepcid. He states when he takes it, he gets hives, tongue swelling, and his throat closes up. Pt is currently in no respiratory distress. Pt does not have active hives or tongue swelling at this time. Pt does report itching, abdominal pain, and a headache. Pt was changed out and belongings were given to security. [employer]guard at bedside.      Zully Moyer RN  07/21/21 2151       Zully Moyer RN  07/21/21 [id number]

## 2021-07-22 NOTE — GROUP NOTE
Group Therapy Note    Date: 7/22/2021    Group Start Time: 1000  Group End Time: 2938  Group Topic: Psychotherapy    TIESHA CAMPOS    KRISH Natarajan LSW        Group Therapy Note    Attendees: 5/17         Patient's Goal:  Increase interpersonal relationship skills    Notes:  Patient was an active participant in group discussion    Status After Intervention:  Unchanged    Participation Level:  Active Listener and Interactive    Participation Quality: Appropriate, Attentive and Sharing      Speech:  normal      Thought Process/Content: Logical      Affective Functioning: Congruent      Mood: anxious      Level of consciousness:  Alert, Oriented x4 and Attentive      Response to Learning: Able to verbalize current knowledge/experience      Endings: None Reported    Modes of Intervention: Socialization and Exploration      Discipline Responsible: /Counselor      Signature:  KRISH Natarajan LSW

## 2021-07-22 NOTE — PROGRESS NOTES
Behavioral Services  Medicare Certification Upon Admission    I certify that this patient's inpatient psychiatric hospital admission is medically necessary for:    [x] (1) Treatment which could reasonably be expected to improve this patient's condition,       [x] (2) Or for diagnostic study;     AND     [x](2) The inpatient psychiatric services are provided while the individual is under the care of a physician and are included in the individualized plan of care.     Estimated length of stay/service 4-7 days    Plan for post-hospital care home with outpatient [facility] f/u    Electronically signed by Mohamud Amezcua MD on 7/22/2021 at 8:14 AM

## 2021-07-22 NOTE — PLAN OF CARE
[facility]  Initial Interdisciplinary Treatment Plan NO      Original treatment plan Date & Time: 7/22/21 0840    Admission Type:  Admission Type: Voluntary    Reason for admission:   Reason for Admission: Overdose on prescription Pepcid that the pt believes he is allergic to    Estimated Length of Stay:  5-7days  Estimated Discharge Date: to be determined by physician    PATIENT STRENGTHS:  Patient Strengths:Strengths: Positive Support, Connection to output provider  Patient Strengths and Limitations:Limitations: Difficulty problem solving/relies on others to help solve problems, Hopeless about future, Apathetic / unmotivated  Addictive Behavior: Addictive Behavior  In the past 3 months, have you felt or has someone told you that you have a problem with:  : None  Do you have a history of Chemical Use?: No  Do you have a history of Alcohol Use?: Yes  Do you have a history of Street Drug Abuse?: Yes  Histroy of Prescripton Drug Abuse?: No  Medical Problems:  Past Medical History:   Diagnosis Date    Anxiety     Arthritis     Asthma     Bipolar I disorder, most recent episode (or current) depressed, unspecified 9/12/2014    Clostridium difficile infection     COPD (chronic obstructive pulmonary disease) ([address]     Depression     Disease of blood and blood forming organ     Eczema     Fracture, metacarpal     R 4th and 5th    Gastric ulcer     Gastritis 06/13/2018    GERD (gastroesophageal reflux disease)     GI bleed     H. 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[address]     Substance abuse ([address]     Suicidal ideation     Suicide attempt by hanging ([address]     Tobacco dependence     Ulcerative colitis ([address]     UTI (urinary tract infection) Status EXAM:Status and Exam  Normal: Yes  Facial Expression: Brightened  Affect: Appropriate  Level of Consciousness: Alert  Mood:Normal: No  Mood: Depressed, Anxious  Motor Activity:Normal: Yes  Interview Behavior: Cooperative  Preception: [place] to Person, [place] to Time, [place] to Place, [place] to Situation  Attention:Normal: Yes  Thought Processes: Circumstantial  Thought Content:Normal: Yes  Hallucinations: None  Delusions: No  Memory:Normal: No  Memory: Poor Recent  Insight and Judgment: No  Insight and Judgment: Poor Judgment, Poor Insight  Present Suicidal Ideation: No  Present Homicidal Ideation: No    EDUCATION:   Learner Progress Toward Treatment Goals: reviewed group plans and strategies for care    Method:group therapy, medication compliance, individualized assessments and care planning    Outcome: needs reinforcement    PATIENT GOALS: to be discussed with patient within 72 hours    PLAN/TREATMENT RECOMMENDATIONS:     continue group therapy , medications compliance, goal setting, individualized assessments and care, continue to monitor pt on unit      SHORT-TERM GOALS:   Time frame for Short-Term Goals: 5-7 days    LONG-TERM GOALS:  Time frame for Long-Term Goals: 6 months  Members Present in Team Meeting: See Signature Sheet    Joceline Giles

## 2021-07-22 NOTE — BH NOTE
[id number] [facility]  Admission Note     Admission Type:   Admission Type: Voluntary    Reason for admission:  Reason for Admission: Overdose on prescription Pepcid that the pt believes he is allergic to    PATIENT STRENGTHS:  Strengths: Positive Support, Connection to output provider    Patient Strengths and Limitations:  Limitations: Difficulty problem solving/relies on others to help solve problems, Hopeless about future, Apathetic / unmotivated    Addictive Behavior:   Addictive Behavior  In the past 3 months, have you felt or has someone told you that you have a problem with:  : None  Do you have a history of Chemical Use?: No  Do you have a history of Alcohol Use?: Yes  Do you have a history of Street Drug Abuse?: Yes  Histroy of Prescripton Drug Abuse?: No    Medical Problems:   Past Medical History:   Diagnosis Date    Anxiety     Arthritis     Asthma     Bipolar I disorder, most recent episode (or current) depressed, unspecified 9/12/2014    Clostridium difficile infection     COPD (chronic obstructive pulmonary disease) ([address]     Depression     Disease of blood and blood forming organ     Eczema     Fracture, metacarpal     R 4th and 5th    Gastric ulcer     Gastritis 06/13/2018    GERD (gastroesophageal reflux disease)     GI bleed     H. 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[address]     Substance abuse ([address]     Suicidal ideation     Suicide attempt by hanging ([address]     Tobacco dependence     Ulcerative colitis ([address]     UTI (urinary tract infection)        Status EXAM:  Status and Exam  Normal: Yes  Facial Expression: Brightened  Affect: Appropriate  Level of Consciousness: Alert  Mood:Normal: No  Mood: Depressed, Anxious  Motor Activity:Normal: Yes  Interview Behavior: Cooperative  Preception: [place] to Person, Pearla Ohms to Time, [place] to Place, [place] to Situation  Attention:Normal: Yes  Thought Processes: Circumstantial  Thought Content:Normal: Yes  Hallucinations: None  Delusions: No  Memory:Normal: No  Memory: Poor Recent  Insight and Judgment: No  Insight and Judgment: Poor Judgment, Poor Insight  Present Suicidal Ideation: No  Present Homicidal Ideation: No    Tobacco Screening:  Practical Counseling, on admission, reuben X, if applicable and completed (first 3 are required if patient doesn't refuse):            ( )  Recognizing danger situations (included triggers and roadblocks)                    ( )  Coping skills (new ways to manage stress, exercise, relaxation techniques, changing routine, distraction)                                                           ( )  Basic information about quitting (benefits of quitting, techniques in how to quit, available resources  ( ) Referral for counseling faxed to [facility]                                           (x) Patient refused counseling  ( ) Patient has not smoked in the last 30 days    Metabolic Screening:    Lab Results   Component Value Date    LABA1C 5.7 10/23/2014       Lab Results   Component Value Date    CHOL 205 (H) 02/21/2017    CHOL 158 11/09/2015    CHOL 152 10/23/2014    CHOL 206 (H) 03/05/2014    CHOL 205 (H) 01/22/2014    CHOL 208 (H) 08/28/2013     Lab Results   Component Value Date    TRIG 189 (H) 02/21/2017    TRIG 223 (H) 11/09/2015    TRIG 52 10/23/2014    TRIG 104 03/05/2014    TRIG 74 01/22/2014    TRIG 104 08/28/2013     Lab Results   Component Value Date    HDL 44 02/21/2017    HDL 40 (L) 11/09/2015    HDL 55 10/23/2014    HDL 54 03/05/2014    HDL 53 01/22/2014    HDL 72 (H) 08/28/2013     No components found for: LDLCAL  No results found for: LABVLDL      Body mass index is 33.52 kg/m².     BP Readings from Last 2 Encounters:   07/22/21 133/81   07/07/21 (!) 152/91           Pt admitted with followings belongings:  Dentures: None  Vision - Corrective Lenses: None  Hearing Aid: None  Jewelry: None  Body Piercings Removed: N/A  Clothing: Footwear, Shirt, Pants, Undergarments (Comment), Socks (shorts, belt, wrap)  Were All Patient Medications Collected?: Yes  Other Valuables: Cell phone, Jayson Bernal (Credit cards, [place] ID, MEdical ID, phone , lighters)     Pt's belongings were inventoried, valuable placed in safe in security envelope #: [id number]. Patient's home medications were locked in safe with other belongings. Patient oriented to surroundings and program expectations and copy of patient rights given. Received admission packet: Yes. Consents reviewed, signed: Yes. Patient verbalize understanding of need for current admission. Patient education on precautions of [facility] and [facility]. Pt admitted to [facility] Unit Room 107 per provider order. Pt changed into hospital attire, nourishment provided. Pt scanned with metal detector. Pt came to ED due to intentional overdose on pills. Pt reports he took about 21 pcs of Pepcid knowing he is allergic to this medication, he did this as an attempt to kill himself. Pt medically cleared and transferred to [facility] voluntarily. Pt friendly and cooperative during intake process, signed all consent forms. Pt reports he has been thinking about his brother's death and he got so depressed that's why he overdosed on pills. Pt reports history of suicide attempt by hanging self when he was 13years old. Pt reports high level of depression and anxiety, but denies current thoughts of harming self or others. Pt denies any hallucinations at this time. Pt has history of substance abuse but denies current use, also denies alcohol use. Pt is not compliant with medication. Pt is previously linked with [facility] when he was living in [address] [place] [address], but not linked currently.  Provider paged for orders. Will monitor pt for safety and behavior.                        Malika Godoy RN

## 2021-07-22 NOTE — CARE COORDINATION
BHI Biopsychosocial Assessment    Current Level of Psychosocial Functioning     Independent  xxx  Dependent    Minimal Assist     Comments:    Psychosocial High Risk Factors (check all that apply)    Unable to obtain meds xx  Chronic illness/pain    Substance abuse  xx  Lack of Family Support   Financial stress   Isolation   Inadequate Community Resources  Suicide attempt(s) xx  Not taking medications   Victim of crime   Developmental Delay  Unable to manage personal needs    Age 72 or older   Homeless  No transportation   Readmission within 30 days  Unemployment xx  Traumatic Event    Comments:   Psychiatric Advanced Directives: pt denies     Family to Involve in Treatment:  Pt reports his brother and cousin are supportive     Sexual Orientation:  N/A    Patient Strengths: pt is linked for MAT at [facility], has supportive family and stable living environment with cousin and brother; pt receives "[employer] income     Patient Barriers: pt is not linked for mental health treatment at [facility] and will need to referred at discharge       Opiate Education Provided:  Pt has history of opiate abuse, currently receiving MAT treatment at [facility].       CMHC/mental health history: Pt linked with [facility]. Plan of Care   medication management, group/individual therapies, family meetings, psycho -education, treatment team meetings to assist with stabilization    Initial Discharge Plan:  Pt to discharge home with family. Clinical Summary:  Sunshine Sneed is a 27year old single male who has been admitted to [place] [address] with report of suicidal attempt by intentional overdose, pt reports aborted attempt by cutting prior to the overdose. Pt reports he is living with his cousin and brother whom are supportive, reports his last use of opiates as 1 month ago, states he has been receiving MAT treatment at [facility] but is not enrolled for mental health services through them and has not been taking medications.  Pt denies any current legal issues.  SW offered ongoing support and assist in linking patient to community services for dual approach,

## 2021-07-22 NOTE — BH NOTE
Patient arrived to unit, escorted by 2 [facility] staff members, patient came with 1 bag of belongings, ambulated onto the unit without assistance and steady gait

## 2021-07-22 NOTE — GROUP NOTE
Group Therapy Note    Date: 7/22/2021    Group Start Time: 1100  Group End Time: 7501  Group Topic: Psychoeducation    [facility]    Nusrat Bledsoe        Group Therapy Note    Attendees: 7         Patient's Goal: To increase interpersonal interactions. Notes:  Patient attended group and participated. Patient was pleasant and appropriate.     Status After Intervention:  Improved    Participation Level: Interactive    Participation Quality: Appropriate and Attentive      Speech:  normal      Thought Process/Content: Logical      Affective Functioning: Congruent      Mood: euthymic      Level of consciousness:  Alert and Oriented x4      Response to Learning: Able to verbalize current knowledge/experience, Capable of insight and Progressing to goal      Endings: None Reported    Modes of Intervention: Socialization, Exploration and Reality-testing      Discipline Responsible: Psychoeducational Specialist      Signature:  Kwesi Lynch

## 2021-07-22 NOTE — BH NOTE
Physician aware of suicidal ideation and patient ale for safety. Patient suicide precautions discontinued due to patient willing to seek out staff if feelings of self harm were to continue. Every 15 minute checks and random spontaneous checks/roundings to continue for patient safety.

## 2021-07-22 NOTE — ED PROVIDER NOTES
pulmonary disease) ([facility]     Depression     Disease of blood and blood forming organ     Eczema     Fracture, metacarpal     R 4th and 5th    Gastric ulcer     Gastritis 06/13/2018    GERD (gastroesophageal reflux disease)     GI bleed     H. 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HCC)     PUD (peptic ulcer disease)     Rheumatoid arthritis ([facility]     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[facility]     Substance abuse ([facility]     Suicidal ideation     Suicide attempt by hanging ([facility]     Tobacco dependence     Ulcerative colitis ([facility]     UTI (urinary tract infection)      SURGICAL HISTORY       Past Surgical History:   Procedure Laterality Date    ABDOMEN SURGERY      upper GI scope 7/7/2015    BRONCHOSCOPY      CHOLECYSTECTOMY, LAPAROSCOPIC  07/14/2017    surgery performed at [address] [place] [address], COLON, DIAGNOSTIC      OTHER SURGICAL HISTORY      lumbar puncture    MT COLONOSCOPY W/BIOPSY SINGLE/MULTIPLE  8/9/2017    COLONOSCOPY WITH BIOPSY performed by Jose Miguel Brewer MD at [facility]    MT EGD TRANSORAL BIOPSY SINGLE/MULTIPLE N/A 3/20/2017    EGD BIOPSY performed by Kiki Hightower MD at [facility]    MT ESOPHAGOGASTRODUODENOSCOPY TRANSORAL DIAGNOSTIC N/A 8/9/2017    EGD ESOPHAGOGASTRODUODENOSCOPY performed by Jose Miguel Brewer MD at [address] [place] [address] N/A 3/20/2017    SIGMOIDOSCOPY DIAGNOSTIC FLEXIBLE performed by Kiki Hightower MD at [facility]   [address] [place] [address] Drive  2/4/16    UPPER GASTROINTESTINAL ENDOSCOPY N/A 6/13/2018    GASTRITIS    UPPER GASTROINTESTINAL ENDOSCOPY N/A 9/20/2018    EGD BIOPSY performed by Ladarius Mitchell MD at [address] [place] [address]       Previous Medications    ALBUTEROL SULFATE HFA (VENTOLIN HFA) Years: 15.00     Pack years: 15.00     Types: Cigarettes    Smokeless tobacco: Never Used   Vaping Use    Vaping Use: Former   Substance Use Topics    Alcohol use: Not Currently     Comment: social ETOH use    Drug use: Not Currently     Types: Opiates      Comment: last used pain pill/fentanyl 2/18/21     PHYSICAL EXAM     INITIAL VITALS: BP (!) 141/92   Pulse 97   Temp 98.2 °F (36.8 °C) (Oral)   Resp 18   Ht 5' 9\" (1.753 m)   Wt 230 lb (104.3 kg)   SpO2 97%   BMI 33.97 kg/m²    Physical Exam  Vitals and nursing note reviewed. Constitutional:       General: He is not in acute distress. Appearance: He is well-developed. HENT:      Head: Normocephalic and atraumatic. Mouth/Throat:      Comments: No evidence of sublingual swelling no evidence of tongue swelling no evidence of posterior oropharyngeal swelling  Eyes:      Conjunctiva/sclera: Conjunctivae normal.   Cardiovascular:      Rate and Rhythm: Normal rate and regular rhythm. Heart sounds: No murmur heard. No friction rub. Pulmonary:      Effort: Pulmonary effort is normal. No respiratory distress. Breath sounds: Normal breath sounds. Comments: No stridor no respiratory distress able to speak in complete in full sentences  Abdominal:      General: There is no distension. Palpations: Abdomen is soft. Tenderness: There is no abdominal tenderness. There is no guarding or rebound. Musculoskeletal:      Cervical back: Neck supple. Skin:     General: Skin is warm and dry. Capillary Refill: Capillary refill takes less than 2 seconds. Neurological:      Mental Status: He is alert. Psychiatric:      Comments: Suicidal       DIAGNOSTIC RESULTS   LABS: All lab results were reviewed by myself, and all abnormals are listed below.   Labs Reviewed   CBC WITH AUTO DIFFERENTIAL - Abnormal; Notable for the following components:       Result Value    RBC 4.32 (*)     Hemoglobin 13.0 (*)     Hematocrit 38.4 (*) All other components within normal limits   COMPREHENSIVE METABOLIC PANEL - Abnormal; Notable for the following components:    Glucose 105 (*)     CREATININE 0.62 (*)     Total Bilirubin 0.27 (*)     All other components within normal limits   ACETAMINOPHEN LEVEL - Abnormal; Notable for the following components:    Acetaminophen Level <5 (*)     All other components within normal limits   SALICYLATE LEVEL - Abnormal; Notable for the following components:    Salicylate Lvl <1 (*)     All other components within normal limits   COVID-19, RAPID   ETHANOL   URINE DRUG SCREEN       EMERGENCY DEPARTMENTCOURSE:   Differential diagnosis includes exacerbation of chronic mental illness medication noncompliance polysubstance abuse overdose malingering. The patient has presented to multiple emergency departments the most recent being yesterday in [place] [address]. Suspect overuse of the emergency department. 12:31 AM EDT  I did speak to poison control Case number [id number] Dr. Rhea Story. They recommend a period of 4-hour observation. Patient has been observed in the emergency department for 4 hours he is medically cleared. His ethanol Tylenol salicylate are negative no prolonged intervals on his EKG no anion gap elevation no LFT elevation. Patient will be admitted for further management of his mental health disorder               Vitals:    Vitals:    07/21/21 2035   BP: (!) 141/92   Pulse: 97   Resp: 18   Temp: 98.2 °F (36.8 °C)   TempSrc: Oral   SpO2: 97%   Weight: 230 lb (104.3 kg)   Height: 5' 9\" (1.753 m)       The patient was given the following medications while in the emergency department:  Orders Placed This Encounter   Medications    0.9 % sodium chloride bolus         FINAL IMPRESSION      1. Intentional drug overdose, initial encounter ([facility]    2.  Suicidal ideation         DISPOSITION/PLAN   Rustam Castro MD  Attending Emergency Physician    This charting supersedes any ED resident or staff charting and was written using speech recognition Sylvain Howell MD  07/22/21 0596

## 2021-07-23 PROCEDURE — 6370000000 HC RX 637 (ALT 250 FOR IP): Performed by: NURSE PRACTITIONER

## 2021-07-23 PROCEDURE — 99232 SBSQ HOSP IP/OBS MODERATE 35: CPT | Performed by: PSYCHIATRY & NEUROLOGY

## 2021-07-23 PROCEDURE — APPSS30 APP SPLIT SHARED TIME 16-30 MINUTES: Performed by: NURSE PRACTITIONER

## 2021-07-23 PROCEDURE — 6370000000 HC RX 637 (ALT 250 FOR IP): Performed by: PSYCHIATRY & NEUROLOGY

## 2021-07-23 PROCEDURE — 1240000000 HC EMOTIONAL WELLNESS R&B

## 2021-07-23 RX ORDER — DIVALPROEX SODIUM 125 MG/1
250 CAPSULE, COATED PELLETS ORAL EVERY 8 HOURS SCHEDULED
Status: DISCONTINUED | OUTPATIENT
Start: 2021-07-23 | End: 2021-07-27 | Stop reason: HOSPADM

## 2021-07-23 RX ADMIN — NICOTINE POLACRILEX 2 MG: 2 GUM, CHEWING BUCCAL at 10:08

## 2021-07-23 RX ADMIN — NICOTINE POLACRILEX 2 MG: 2 GUM, CHEWING BUCCAL at 19:42

## 2021-07-23 RX ADMIN — DULOXETINE 30 MG: 30 CAPSULE, DELAYED RELEASE ORAL at 08:18

## 2021-07-23 RX ADMIN — NICOTINE POLACRILEX 2 MG: 2 GUM, CHEWING BUCCAL at 18:03

## 2021-07-23 RX ADMIN — HYDROXYZINE HYDROCHLORIDE 50 MG: 50 TABLET, FILM COATED ORAL at 10:52

## 2021-07-23 RX ADMIN — ARIPIPRAZOLE 15 MG: 15 TABLET ORAL at 08:17

## 2021-07-23 RX ADMIN — NICOTINE POLACRILEX 2 MG: 2 GUM, CHEWING BUCCAL at 22:02

## 2021-07-23 RX ADMIN — NICOTINE POLACRILEX 2 MG: 2 GUM, CHEWING BUCCAL at 21:31

## 2021-07-23 RX ADMIN — HYDROXYZINE HYDROCHLORIDE 50 MG: 50 TABLET, FILM COATED ORAL at 21:03

## 2021-07-23 RX ADMIN — ACETAMINOPHEN 650 MG: 325 TABLET ORAL at 22:13

## 2021-07-23 RX ADMIN — NICOTINE POLACRILEX 2 MG: 2 GUM, CHEWING BUCCAL at 17:11

## 2021-07-23 RX ADMIN — ACETAMINOPHEN 650 MG: 325 TABLET ORAL at 08:17

## 2021-07-23 RX ADMIN — Medication 1 TABLET: at 08:17

## 2021-07-23 RX ADMIN — HYDROXYZINE HYDROCHLORIDE 50 MG: 50 TABLET, FILM COATED ORAL at 15:42

## 2021-07-23 RX ADMIN — NICOTINE POLACRILEX 2 MG: 2 GUM, CHEWING BUCCAL at 08:19

## 2021-07-23 RX ADMIN — GABAPENTIN 600 MG: 300 CAPSULE ORAL at 21:03

## 2021-07-23 RX ADMIN — DIVALPROEX SODIUM 250 MG: 125 CAPSULE, COATED PELLETS ORAL at 21:03

## 2021-07-23 RX ADMIN — FENOFIBRATE 160 MG: 160 TABLET ORAL at 08:17

## 2021-07-23 RX ADMIN — TRAZODONE HYDROCHLORIDE 100 MG: 100 TABLET ORAL at 21:03

## 2021-07-23 RX ADMIN — CLONIDINE HYDROCHLORIDE 0.1 MG: 0.1 TABLET ORAL at 12:42

## 2021-07-23 RX ADMIN — ONDANSETRON HYDROCHLORIDE 4 MG: 4 TABLET, FILM COATED ORAL at 21:03

## 2021-07-23 RX ADMIN — NICOTINE POLACRILEX 2 MG: 2 GUM, CHEWING BUCCAL at 12:25

## 2021-07-23 RX ADMIN — GABAPENTIN 600 MG: 300 CAPSULE ORAL at 08:17

## 2021-07-23 RX ADMIN — METFORMIN HYDROCHLORIDE 500 MG: 500 TABLET ORAL at 08:17

## 2021-07-23 RX ADMIN — METFORMIN HYDROCHLORIDE 500 MG: 500 TABLET ORAL at 17:11

## 2021-07-23 RX ADMIN — CLONIDINE HYDROCHLORIDE 0.1 MG: 0.1 TABLET ORAL at 21:42

## 2021-07-23 RX ADMIN — GABAPENTIN 600 MG: 300 CAPSULE ORAL at 14:31

## 2021-07-23 RX ADMIN — BUPRENORPHINE AND NALOXONE 1 FILM: 8; 2 FILM BUCCAL; SUBLINGUAL at 08:18

## 2021-07-23 ASSESSMENT — PAIN SCALES - GENERAL
PAINLEVEL_OUTOF10: 8
PAINLEVEL_OUTOF10: 0
PAINLEVEL_OUTOF10: 5

## 2021-07-23 ASSESSMENT — PAIN - FUNCTIONAL ASSESSMENT
PAIN_FUNCTIONAL_ASSESSMENT: 0-10
PAIN_FUNCTIONAL_ASSESSMENT: 0-10

## 2021-07-23 ASSESSMENT — PAIN DESCRIPTION - DESCRIPTORS: DESCRIPTORS: HEADACHE

## 2021-07-23 NOTE — PLAN OF CARE
Problem: Suicide risk  Goal: Provide patient with safe environment  Description: Provide patient with safe environment  7/22/2021 2255 by Ezekiel Cordero LPN  Outcome: Ongoing   Patient provided with an safe environment while on the unit. Problem: Altered Mood, Depressive Behavior:  Goal: Able to verbalize and/or display a decrease in depressive symptoms  Description: Able to verbalize and/or display a decrease in depressive symptoms  7/22/2021 2255 by Ezekiel Cordero LPN  Outcome: Ongoing   Patient denies depression at this time. Problem: Altered Mood, Depressive Behavior:  Goal: Absence of self-harm  Description: Absence of self-harm  7/22/2021 2255 by Ezekiel Cordero LPN  Outcome: Ongoing   Patient denies self harm. No sign of self harm noted. Problem: Tobacco Use:  Goal: Inpatient tobacco use cessation counseling participation  Description: Inpatient tobacco use cessation counseling participation  7/22/2021 2255 by Ezekiel Cordero LPN  Outcome: Ongoing   Patient denies tobacco counseling at this time. Will continue to monitor for safety every 15 minutes and provide support as needed.

## 2021-07-23 NOTE — PROGRESS NOTES
Daily Progress Note  7/23/2021    Patient Name: Lila Rutherford    CHIEF COMPLAINT: Depression with suicidal ideation         SUBJECTIVE:      Patient is seen today for a follow up assessment. Patient is at the nurses station frequently requesting that social work arrange his discharge today. He reports that [facility] with [facility] and that they have a bed for him but will not hold it until Monday. He has repeatedly requested that this [place] place a discharge order. He reports that he has been \"doing good\". He reports no side effects to his current medication regimen. He reports that he has been attending group and talking about the death of his brother which he identifies as a stressor for his suicidal thoughts. In addition to focusing today on [facility], he is also requesting his tizanidine, Belsomra and something more for anxiety. We discussed the fact that if his anxiety is uncontrolled it is not appropriate for him to discharge today and he backs down from further seeking behavior. After initial interview for assessment he approaches author on 3 different occasions, he is also repeatedly interrupting social work. He remains quite intrusive requiring repeated redirection.   We discussed the importance of stability in symptoms and while he initially verbalizes understanding he is quick to change subjective report multiple times throughout the interview    Appetite:  [x] Adequate/Unchanged  [] Increased  [] Decreased      Sleep:       [x] Adequate/Unchanged  [] Fair  [] Poor      Group Attendance on Unit:   [x] Yes  [] Selectively    [] No    Medication Side Effects: Denies         Mental Status Exam  Level of consciousness: Alert and awake   Appearance: Appropriate attire for setting, standing, with fair  grooming and hygiene   Behavior/Motor: Intrusive, demanding, no psychomotor abnormalities   Attitude toward examiner: Somewhat cooperative, attentive, intense eye contact  Speech: Rapid, loud with normal tone  Mood: \"Better\"  Affect: Superficial, somewhat demanding  Thought processes: linear, coherent and tangential   Thought content: Discharge and medication focus, Denies homicidal ideation  Suicidal Ideation: Reports improvement in suicidal ideations, contracts for safety on the unit. Delusions: No evidence of delusions. Perceptual Disturbance: Denies, patient does not appear to be responding to internal stimuli. Cognition: Oriented to self, location, time, and situation  Memory: intact  Insight: poor   Judgement: poor       Data   height is 5' 9\" (1.753 m) and weight is 227 lb (103 kg). His oral temperature is 97.8 °F (36.6 °C). His blood pressure is 139/96 (abnormal) and his pulse is 75. His respiration is 14 and oxygen saturation is 96%.    Labs:   Admission on 07/21/2021   Component Date Value Ref Range Status    WBC 07/21/2021 6.6  3.5 - 11.0 k/uL Final    RBC 07/21/2021 4.32* 4.5 - 5.9 m/uL Final    Hemoglobin 07/21/2021 13.0* 13.5 - 17.5 g/dL Final    Hematocrit 07/21/2021 38.4* 41 - 53 % Final    MCV 07/21/2021 89.0  80 - 100 fL Final    MCH 07/21/2021 30.2  26 - 34 pg Final    MCHC 07/21/2021 33.9  31 - 37 g/dL Final    RDW 07/21/2021 14.3  11.5 - 14.9 % Final    Platelets 44/19/3267 280  150 - 450 k/uL Final    MPV 07/21/2021 7.8  6.0 - 12.0 fL Final    NRBC Automated 07/21/2021 NOT REPORTED  per 100 WBC Final    Differential Type 07/21/2021 NOT REPORTED   Final    Seg Neutrophils 07/21/2021 65  36 - 66 % Final    Lymphocytes 07/21/2021 27  24 - 44 % Final    Monocytes 07/21/2021 7  1 - 7 % Final    Eosinophils % 07/21/2021 1  0 - 4 % Final    Basophils 07/21/2021 0  0 - 2 % Final    Immature Granulocytes 07/21/2021 NOT REPORTED  0 % Final    Segs Absolute 07/21/2021 4.20  1.3 - 9.1 k/uL Final    Absolute Lymph # 07/21/2021 1.80  1.0 - 4.8 k/uL Final    Absolute Mono # 07/21/2021 0.50  0.1 - 1.3 k/uL Final    Absolute Eos # 07/21/2021 0.10  0.0 - 0.4 k/uL Final    Basophils Absolute 07/21/2021 0.00  0.0 - 0.2 k/uL Final    Absolute Immature Granulocyte 07/21/2021 NOT REPORTED  0.00 - 0.30 k/uL Final    WBC Morphology 07/21/2021 NOT REPORTED   Final    RBC Morphology 07/21/2021 NOT REPORTED   Final    Platelet Estimate 92/83/3674 NOT REPORTED   Final    Glucose 07/21/2021 105* 70 - 99 mg/dL Final    BUN 07/21/2021 9  6 - 20 mg/dL Final    CREATININE 07/21/2021 0.62* 0.70 - 1.20 mg/dL Final    Bun/Cre Ratio 07/21/2021 NOT REPORTED  9 - 20 Final    Calcium 07/21/2021 9.7  8.6 - 10.4 mg/dL Final    Sodium 07/21/2021 139  135 - 144 mmol/L Final    Potassium 07/21/2021 3.7  3.7 - 5.3 mmol/L Final    Chloride 07/21/2021 101  98 - 107 mmol/L Final    CO2 07/21/2021 26  20 - 31 mmol/L Final    Anion Gap 07/21/2021 12  9 - 17 mmol/L Final    Alkaline Phosphatase 07/21/2021 68  40 - 129 U/L Final    ALT 07/21/2021 21  5 - 41 U/L Final    AST 07/21/2021 30  <40 U/L Final    Total Bilirubin 07/21/2021 0.27* 0.3 - 1.2 mg/dL Final    Total Protein 07/21/2021 7.7  6.4 - 8.3 g/dL Final    Albumin 07/21/2021 4.3  3.5 - 5.2 g/dL Final    Albumin/Globulin Ratio 07/21/2021 NOT REPORTED  1.0 - 2.5 Final    GFR Non- 07/21/2021 >60  >60 mL/min Final    GFR  07/21/2021 >60  >60 mL/min Final    GFR Comment 07/21/2021        Final    Comment: Average GFR for 30-36 years old:   80 mL/min/1.73sq m  Chronic Kidney Disease:   <60 mL/min/1.73sq m  Kidney failure:   <15 mL/min/1.73sq m              eGFR calculated using average adult body mass.  Additional eGFR calculator available at:        "[employer].br            GFR Staging 07/21/2021 NOT REPORTED   Final    Acetaminophen Level 07/21/2021 <5* 10 - 30 ug/mL Final    Ethanol 07/21/2021 <10  <10 mg/dL Final    Ethanol percent 07/21/2021 <2.657  % Final    Salicylate Lvl 90/80/1841 <1* 3 - 10 mg/dL Final    Ventricular Rate 07/21/2021 82  BPM Final    Atrial Rate 07/21/2021 82  BPM Final    P-R Interval 07/21/2021 148  ms Final    QRS Duration 07/21/2021 92  ms Final    Q-T Interval 07/21/2021 362  ms Final    QTc Calculation (Bazett) 07/21/2021 422  ms Final    P Axis 07/21/2021 46  degrees Final    R Axis 07/21/2021 51  degrees Final    T Axis 07/21/2021 46  degrees Final    Specimen Description 07/21/2021 . NASOPHARYNGEAL SWAB   Final    SARS-CoV-2, Rapid 07/21/2021 Not Detected  Not Detected Final    Comment:       Rapid NAAT:  The specimen is NEGATIVE for SARS-CoV-2, the novel coronavirus associated with   COVID-19. The ID NOW COVID-19 assay is designed to detect the virus that causes COVID-19 in patients   with signs and symptoms of infection who are suspected of COVID-19. An individual without symptoms of COVID-19 and who is not shedding SARS-CoV-2 virus would   expect to have a negative (not detected) result in this assay. Negative results should be treated as presumptive and, if inconsistent with clinical signs   and symptoms or necessary for patient management,  should be tested with an alternative molecular assay. Negative results do not preclude   SARS-CoV-2 infection and   should not be used as the sole basis for patient management decisions.          Fact sheet for Healthcare Providers: Jyothi.margo  Fact sheet for Patients: Jyothi.margo          Methodology: Isothermal Nucleic Acid Amplification      Amphetamine Screen, Ur 07/22/2021 NEGATIVE  NEGATIVE Final    Comment:       (Positive cutoff 1000 ng/mL)                  Barbiturate Screen, Ur 07/22/2021 NEGATIVE  NEGATIVE Final    Comment:       (Positive cutoff 200 ng/mL)                  Benzodiazepine Screen, Urine 07/22/2021 NEGATIVE  NEGATIVE Final    Comment:       (Positive cutoff 200 ng/mL)                  Cocaine Metabolite, Urine 07/22/2021 NEGATIVE  NEGATIVE Final    Comment: tablet 500 mg, 500 mg, Oral, BID WC  therapeutic multivitamin-minerals 1 tablet, 1 tablet, Oral, Daily  pantoprazole (PROTONIX) tablet 40 mg, 40 mg, Oral, QAM AC  traZODone (DESYREL) tablet 100 mg, 100 mg, Oral, Nightly  cloNIDine (CATAPRES) tablet 0.1 mg, 0.1 mg, Oral, TID PRN  ondansetron (ZOFRAN) tablet 4 mg, 4 mg, Oral, Q8H PRN  buprenorphine-naloxone (SUBOXONE) 8-2 MG SL film 1 Film, 1 Film, Sublingual, Daily  gabapentin (NEURONTIN) capsule 600 mg, 600 mg, Oral, TID  ARIPiprazole (ABILIFY) tablet 15 mg, 15 mg, Oral, Daily    ASSESSMENT  Schizoaffective disorder, bipolar type (HCC)         PLAN  Patient symptoms are: Improving  Continue current medication regimen. Monitor need and frequency of PRN medications. His request for muscle relaxers, benzodiazepines and additional sleep medication were declined. Encourage participation in groups and milieu. Attempt to develop insight. Psycho-education conducted. Supportive Therapy conducted. Probable discharge is per attending physician. Follow-up daily while inpatient. Patient continues to be monitored in the inpatient psychiatric facility at [facility] for safety and stabilization. Patient continues to need, on a daily basis, active treatment furnished directly by or requiring the supervision of inpatient psychiatric personnel. Electronically signed by MASSIEL Del Rio CNP on 7/23/2021 at 1:49 PM    **This report has been created using voice recognition software. It may contain minor errors which are inherent in voice recognition technology. **    I independently saw and evaluated the patient. I reviewed the nurse practitioners documentation above. Any additional comments or changes to the nurse practitioners documentation are stated below otherwise agree with assessment. Plan will be as follows:  Patient intrusive, has flights of ideas, pressured speech. Not able to stick to topic. Manipulative and unreliable.   As needed request from nursing for restlessness and anxiety and poor sleep. We will add Depakote 3 times daily  PLAN  Patient s symptoms   show modest improvement  Depakote 250 mg 3 times daily  Attempt to develop insight  Psycho-education conducted. Supportive Therapy conducted.   Probable discharge is likely Monday or Tuesday  Follow-up daily while on inpatient unit

## 2021-07-23 NOTE — GROUP NOTE
Group Therapy Note    Date: 7/23/2021    Group Start Time: 0900  Group End Time: 0915  Group Topic: Community Meeting    TIESHA Lui        Group Therapy Note    Attendees: 3/17         Patient's Goal:  To orient to unit and set a daily goal    Notes:  Patient attended and participated in group. Daily goal: Fill out application for AoD treatment center; Meet with DR to do plan for D/C    Status After Intervention:  Improved    Participation Level:  Active Listener and Interactive    Participation Quality: Appropriate, Attentive and Sharing      Speech:  normal      Thought Process/Content: Logical  Linear      Affective Functioning: Congruent      Mood: euthymic      Level of consciousness:  Alert and Attentive      Response to Learning: Able to verbalize current knowledge/experience and Progressing to goal      Endings: None Reported    Modes of Intervention: Education, Support, Socialization, Exploration and Reality-testing      Discipline Responsible: Psychoeducational Specialist      Signature:  Daisy Lui

## 2021-07-23 NOTE — GROUP NOTE
Group Therapy Note    Date: 7/23/2021    Group Start Time: 1000  Group End Time: 3784  Group Topic: Psychotherapy    TIESHA CAMPOS    KRISH Peralta LSW        Group Therapy Note    Attendees: 4/17         Patient's Goal: Increase interpersonal relationship skills    Notes: Patient was an active participant in group discussion however he had to be redirected several times and was somewhat resistant to redirection    Status After Intervention:  Unchanged    Participation Level:  Active Listener, Interactive and Monopolizing    Participation Quality: Attentive      Speech:  normal      Thought Process/Content: Logical      Affective Functioning: Constricted/Restricted      Mood: anxious      Level of consciousness:  Preoccupied      Response to Learning: Able to verbalize current knowledge/experience      Endings: None Reported    Modes of Intervention: Socialization and Exploration      Discipline Responsible: /Counselor      Signature:  KRISH Peralta LSW

## 2021-07-23 NOTE — CARE COORDINATION
Patient approached DEZ requesting information for sober living housing- specifically for [facility]. Patient contacted [facility] and was directed to fill out an application which  SW provided to him and will fax once completed.

## 2021-07-23 NOTE — CARE COORDINATION
DEZ called [facility] rep this date with goal of scheduling outpatient services for patient in case of weekend d/c, for both an MAT and outpt services, rep states patient is closed to their agency, has not been receiving mental health or MAT treatment since leaving their recovery housing 7/18/2021 (he was there from 7/15/2021-7/18/2021). Rep states [facility] with an appointment for outpatient MAT, and did not show up for appointment, is now closed to MAT program. DEZ made intake MAT appointment scheduled for 7/27/2021 at 1pm with [facility] at [email or web address] with goal of providing pt link to suboxone treatment.

## 2021-07-23 NOTE — GROUP NOTE
Group Therapy Note    Date: 7/23/2021    Group Start Time: 1100  Group End Time: 8831  Group Topic: Music Therapy    TIESHA CAMPOS    Herb Simpson        Group Therapy Note    Attendees: 4/16         Patient's Goal:  Patient chose songs and dedicated them to important people in their lives, and shared about these people. Goals to relfect on supports; Increase sense of community; Normalize environment    Notes:  Patient attended and participated in group, sharing music and dedicating the song to his brother. Patient had difficult sitting and remaining still. Expressed he was anxious about follow-up plans with the SW. Repeatedly checking out the group room door when people would pass by, stating he was looking for the DR and SW. At one point was self-dialoguing and becoming agitated and disruptive. Redirectable with support and encouragement. Status After Intervention:  Improved    Participation Level:  Active Listener and Interactive    Participation Quality: Appropriate, Attentive and Sharing      Speech:  normal      Thought Process/Content: Logical  Linear      Affective Functioning: Congruent      Mood: angry      Level of consciousness:  Preoccupied      Response to Learning: Able to verbalize current knowledge/experience, Able to retain information, Able to change behavior and Progressing to goal      Endings: None Reported    Modes of Intervention: Support, Socialization, Exploration, Activity, Media and Reality-testing      Discipline Responsible: Psychoeducational Specialist      Signature:  Herb Simpson

## 2021-07-23 NOTE — PLAN OF CARE
[facility]  Day 3 Interdisciplinary Treatment Plan NOTE    Review Date & Time: 7/23/2021   0956    Admission Type:   Admission Type: Voluntary    Reason for admission:  Reason for Admission: Overdose on prescription Pepcid that the pt believes he is allergic to  Estimated Length of Stay: 5-7 days  Estimated Discharge Date Update: to be determined by physician    PATIENT STRENGTHS:  Patient Strengths Strengths: Positive Support, Connection to output provider  Patient Strengths and Limitations:Limitations: Tendency to isolate self, Lacks leisure interests, Difficulty problem solving/relies on others to help solve problems  Addictive Behavior:Addictive Behavior  In the past 3 months, have you felt or has someone told you that you have a problem with:  : None  Do you have a history of Chemical Use?: No  Do you have a history of Alcohol Use?: Yes  Do you have a history of Street Drug Abuse?: Yes  Histroy of Prescripton Drug Abuse?: No  Medical Problems:  Past Medical History:   Diagnosis Date    Anxiety     Arthritis     Asthma     Bipolar I disorder, most recent episode (or current) depressed, unspecified 9/12/2014    Clostridium difficile infection     COPD (chronic obstructive pulmonary disease) ([address]     Depression     Disease of blood and blood forming organ     Eczema     Fracture, metacarpal     R 4th and 5th    Gastric ulcer     Gastritis 06/13/2018    GERD (gastroesophageal reflux disease)     GI bleed     H. 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[address]     Substance abuse ([address]     Suicidal ideation     Suicide attempt by hanging ([address]     Tobacco dependence     Ulcerative colitis ([address]     UTI (urinary tract infection) Risk:  Fall RiskTotal: 53  Ross Scale Ross Scale Score: 22  BVC Total: 0  Change in scores no Changes to plan of Care no    Status EXAM:   Status and Exam  Normal: No  Facial Expression: Flat  Affect: Appropriate  Level of Consciousness: Alert  Mood:Normal: No  Mood: Anxious, Depressed, Suspicious  Motor Activity:Normal: Yes  Interview Behavior: Cooperative  Preception: [place] to Person, [place] to Place, [place] to Time, [place] to Situation  Attention:Normal: Yes  Attention: Distractible  Thought Processes: Circumstantial  Thought Content:Normal: Yes  Thought Content: Preoccupations  Hallucinations: None  Delusions: No  Memory:Normal: No  Memory: Poor Recent  Insight and Judgment: No  Insight and Judgment: Poor Judgment, Poor Insight  Present Suicidal Ideation: No  Present Homicidal Ideation: No    Daily Assessment Last Entry:   Daily Sleep (WDL): Within Defined Limits         Patient Currently in Pain: Denies  Daily Nutrition (WDL): Within Defined Limits    Patient Monitoring:  Frequency of Checks: 4 times per hour, close    Psychiatric Symptoms:   Depression Symptoms  Depression Symptoms: Loss of interest  Anxiety Symptoms  Anxiety Symptoms: Generalized  Elizabeth Symptoms  Elizabeth Symptoms: No problems reported or observed. Psychosis Symptoms  Delusion Type: No problems reported or observed. Suicide Risk CSSR-S:  1) Within the past month, have you wished you were dead or wished you could go to sleep and not wake up? : Yes  2) Have you actually had any thoughts of killing yourself? : Yes  3) Have you been thinking about how you might kill yourself? : Yes  5) Have you started to work out or worked out the details of how to kill yourself?  Do you intend to carry out this plan? : Yes  6) Have you ever done anything, started to do anything, or prepared to do anything to end your life?: Yes  Change in Result NO Change in Plan of care NO      EDUCATION:   EDUCATION:   Learner Progress Toward Treatment Goals: Reviewed results and recommendations of this team, Reviewed group plan and strategies, Reviewed signs, symptoms and risk of self harm and violent behavior, Reviewed goals and plan of care    Method:small group, individual verbal education    Outcome: Patient unable to attend due to being in shower    PATIENT GOALS:  Short term: Patient unable to attend due to being in shower  Long term: Patient unable to attend due to being in shower    PLAN/TREATMENT RECOMMENDATIONS UPDATE: continue with group therapies, increased socialization, continue planning for after discharge goals, continue with medication compliance    SHORT-TERM GOALS UPDATE:   Time frame for Short-Term Goals: 5-7 days    LONG-TERM GOALS UPDATE:   Time frame for Long-Term Goals: 6 months  Members Present in Team Meeting: See Signature Sheet    Bhavya Pacheco

## 2021-07-23 NOTE — GROUP NOTE
Group Therapy Note    Date: 7/23/2021    Group Start Time: 1330  Group End Time: 1032  Group Topic: Music Therapy    TIESHA CAMPOS    Lj Mercer        Group Therapy Note    Attendees: 4/15            Patient's Goal:  Patient worked together to create a group playlist going from low-energy/calming music, gradually to more energetic music. Goals to provide leisure opportunities; Increase engagement through patients creating the flow and structure of the group; Increase sense of community; Normalize the environment     Notes:  Patient attended part of group. Patient was in and out of group, and per RN report was at desk asking to speak to SW throughout. When in group, unable to sit down and pacing throughout, while singing along to music. Difficult to redirect to sitting during group. Told he was welcome to sing a long, but side comments and conversations were disruptive. Somewhat receptive to this limit.   Patient expressed he was distracted thinking about D/C plans    Status After Intervention:  Unchanged    Participation Level: Minimal    Participation Quality: Resistant      Speech:  normal      Thought Process/Content: Logical  Linear      Affective Functioning: Constricted/Restricted      Mood: anxious      Level of consciousness:  Preoccupied      Response to Learning: Resistant      Endings: None Reported    Modes of Intervention: Socialization, Exploration, Activity, Media, Limit-setting and Reality-testing      Discipline Responsible: Psychoeducational Specialist      Signature:  Lj Mercer

## 2021-07-24 PROCEDURE — 6370000000 HC RX 637 (ALT 250 FOR IP): Performed by: PSYCHIATRY & NEUROLOGY

## 2021-07-24 PROCEDURE — 6370000000 HC RX 637 (ALT 250 FOR IP): Performed by: NURSE PRACTITIONER

## 2021-07-24 PROCEDURE — 99232 SBSQ HOSP IP/OBS MODERATE 35: CPT | Performed by: NURSE PRACTITIONER

## 2021-07-24 PROCEDURE — 1240000000 HC EMOTIONAL WELLNESS R&B

## 2021-07-24 RX ORDER — TRAZODONE HYDROCHLORIDE 150 MG/1
150 TABLET ORAL NIGHTLY
Status: DISCONTINUED | OUTPATIENT
Start: 2021-07-24 | End: 2021-07-27 | Stop reason: HOSPADM

## 2021-07-24 RX ADMIN — NICOTINE POLACRILEX 2 MG: 2 GUM, CHEWING BUCCAL at 17:46

## 2021-07-24 RX ADMIN — NICOTINE POLACRILEX 2 MG: 2 GUM, CHEWING BUCCAL at 08:06

## 2021-07-24 RX ADMIN — CLONIDINE HYDROCHLORIDE 0.1 MG: 0.1 TABLET ORAL at 20:07

## 2021-07-24 RX ADMIN — METFORMIN HYDROCHLORIDE 500 MG: 500 TABLET ORAL at 17:17

## 2021-07-24 RX ADMIN — FENOFIBRATE 160 MG: 160 TABLET ORAL at 08:02

## 2021-07-24 RX ADMIN — NICOTINE POLACRILEX 2 MG: 2 GUM, CHEWING BUCCAL at 20:11

## 2021-07-24 RX ADMIN — DULOXETINE 30 MG: 30 CAPSULE, DELAYED RELEASE ORAL at 08:02

## 2021-07-24 RX ADMIN — NICOTINE POLACRILEX 2 MG: 2 GUM, CHEWING BUCCAL at 19:16

## 2021-07-24 RX ADMIN — PANTOPRAZOLE SODIUM 40 MG: 40 TABLET, DELAYED RELEASE ORAL at 08:02

## 2021-07-24 RX ADMIN — NICOTINE POLACRILEX 2 MG: 2 GUM, CHEWING BUCCAL at 13:40

## 2021-07-24 RX ADMIN — BUPRENORPHINE AND NALOXONE 1 FILM: 8; 2 FILM BUCCAL; SUBLINGUAL at 08:02

## 2021-07-24 RX ADMIN — HYDROXYZINE HYDROCHLORIDE 50 MG: 50 TABLET, FILM COATED ORAL at 16:15

## 2021-07-24 RX ADMIN — ARIPIPRAZOLE 15 MG: 15 TABLET ORAL at 08:02

## 2021-07-24 RX ADMIN — GABAPENTIN 600 MG: 300 CAPSULE ORAL at 13:40

## 2021-07-24 RX ADMIN — Medication 1 TABLET: at 08:02

## 2021-07-24 RX ADMIN — DIVALPROEX SODIUM 250 MG: 125 CAPSULE, COATED PELLETS ORAL at 20:07

## 2021-07-24 RX ADMIN — HYDROXYZINE HYDROCHLORIDE 50 MG: 50 TABLET, FILM COATED ORAL at 08:06

## 2021-07-24 RX ADMIN — TRAZODONE HYDROCHLORIDE 150 MG: 150 TABLET ORAL at 20:07

## 2021-07-24 RX ADMIN — GABAPENTIN 600 MG: 300 CAPSULE ORAL at 20:07

## 2021-07-24 RX ADMIN — NICOTINE POLACRILEX 2 MG: 2 GUM, CHEWING BUCCAL at 15:51

## 2021-07-24 RX ADMIN — HYDROXYZINE HYDROCHLORIDE 50 MG: 50 TABLET, FILM COATED ORAL at 20:07

## 2021-07-24 RX ADMIN — NICOTINE POLACRILEX 2 MG: 2 GUM, CHEWING BUCCAL at 11:52

## 2021-07-24 RX ADMIN — GABAPENTIN 600 MG: 300 CAPSULE ORAL at 08:02

## 2021-07-24 RX ADMIN — METFORMIN HYDROCHLORIDE 500 MG: 500 TABLET ORAL at 08:02

## 2021-07-24 RX ADMIN — NICOTINE POLACRILEX 2 MG: 2 GUM, CHEWING BUCCAL at 21:28

## 2021-07-24 RX ADMIN — NICOTINE POLACRILEX 2 MG: 2 GUM, CHEWING BUCCAL at 09:28

## 2021-07-24 RX ADMIN — CLONIDINE HYDROCHLORIDE 0.1 MG: 0.1 TABLET ORAL at 12:59

## 2021-07-24 RX ADMIN — ONDANSETRON HYDROCHLORIDE 4 MG: 4 TABLET, FILM COATED ORAL at 20:07

## 2021-07-24 RX ADMIN — NICOTINE POLACRILEX 2 MG: 2 GUM, CHEWING BUCCAL at 14:38

## 2021-07-24 NOTE — GROUP NOTE
Group Therapy Note    Date: 7/23/2021    Group Start Time: 2000  Group End Time: 2100  Group Topic: Wrap-Up    DANUTA BHI BETH Zapata        Group Therapy Note    Attendees: 6         Patient's Goal:  I want to leave but my Dr started a new med    Notes:  It's called Depakote, I'll take it & see what happens, but I'm doing good    Status After Intervention:  Improved    Participation Level: Monopolizing    Participation Quality: Intrusive      Speech:  normal      Thought Process/Content: Linear      Affective Functioning: Blunted      Mood: anxious      Level of consciousness:  Alert and Preoccupied      Response to Learning: Capable of insight      Endings: None Reported    Modes of Intervention: Problem-solving      Discipline Responsible: [employer]      Signature:  Guille Zapata

## 2021-07-24 NOTE — GROUP NOTE
Group Therapy Note    Date: 7/24/2021    Group Start Time: 0900  Group End Time: 0930  Group Topic: Community Meeting    TIESHA CAMPOS    Ej Reyes RN        Group Therapy Note    Attendees: 03         Patient's Goal:  Work on discharge planning    Notes:      Status After Intervention:  Improved    Participation Level: Active Listener    Participation Quality: Appropriate and Sharing      Speech:  normal      Thought Process/Content: Logical      Affective Functioning: Congruent      Mood: euthymic      Level of consciousness:  Alert and Oriented x4      Response to Learning: Able to verbalize current knowledge/experience      Endings: None Reported    Modes of Intervention: Education and Socialization      Discipline Responsible: Registered Nurse      Signature:   Ej Reyes RN

## 2021-07-24 NOTE — GROUP NOTE
Group Therapy Note    Date: 7/24/2021    Group Start Time: 1030  Group End Time: 1100  Group Topic: Healthy Living/Wellness    DANUTA CHRIS Day RN        Group Therapy Note    Attendees: 8 out of 19         Patient's Goal:  participation    Notes:      Status After Intervention:  Improved    Participation Level:  Active Listener    Participation Quality: Appropriate      Speech:  normal      Thought Process/Content: Logical      Affective Functioning: Congruent      Mood: stable      Level of consciousness:  Alert      Response to Learning: Able to verbalize current knowledge/experience      Endings: None Reported    Modes of Intervention: Support      Discipline Responsible: Registered Nurse      Signature:  Omaira Day RN         Group Therapy Note

## 2021-07-24 NOTE — PLAN OF CARE
Problem: Suicide risk  Goal: Provide patient with safe environment  Description: Provide patient with safe environment  Outcome: Ongoing  Note: Safety maintained per unit policy, including q 63E patient safety checks. Problem: Altered Mood, Depressive Behavior:  Goal: Able to verbalize and/or display a decrease in depressive symptoms  Description: Able to verbalize and/or display a decrease in depressive symptoms  Outcome: Ongoing  Note: Patient verbalizes a decrease in depression this shift. Patient is out and active on the unit tonight. He is compliant with all scheduled hs medications but does have moments where he is medication focused. Patient frequently asks for a \"shot\" to help with sleep and anxiety. Patient encouraged to utilize coping skills for help with relaxation. He is redirectable. Emotional support and reassurance given. Problem: Altered Mood, Depressive Behavior:  Goal: Absence of self-harm  Description: Absence of self-harm  Outcome: Ongoing  Note: Patient remains free from self harm. Patient has been out and social with peers and staff this shift. He is anxious at times and impulsive. Patient requires frequent redirection and appears med focused at times. No self harm noted. Will continue to monitor patient for safety and behavior. Problem: Tobacco Use:  Goal: Inpatient tobacco use cessation counseling participation  Description: Inpatient tobacco use cessation counseling participation  Outcome: Ongoing  Note: Patient has nicorette gum 2mg ordered for smoking cessation. Patient has been using order appropriately this shift but has been asking for it quite frequently. Patient declines tobacco cessation education at this time. Problem: Pain:  Goal: Pain level will decrease  Description: Pain level will decrease  Outcome: Ongoing  Note: Patient reports pain in his head this evening. Patient is accepting of prn pain medication.

## 2021-07-24 NOTE — GROUP NOTE
Group Therapy Note    Date: 7/24/2021    Group Start Time: 1400  Group End Time: 1430  Group Topic: Music Therapy    [facility]    Kandy Novak RN        Group Therapy Note    Attendees: 7/16         Patient's Goal:  Music therapy    Notes:  n/a    Status After Intervention:  Improved    Participation Level:  Active Listener    Participation Quality: Appropriate      Speech:  normal      Thought Process/Content: Logical      Affective Functioning: Congruent      Mood: elevated      Level of consciousness:  Alert      Response to Learning: Able to verbalize current knowledge/experience      Endings: None Reported    Modes of Intervention: Socialization      Discipline Responsible: Registered Nurse      Signature:  Kandy Novak RN

## 2021-07-24 NOTE — BH NOTE
Pt refused scheduled Depakote 250 mg this AM. Pt states he does not like how it makes him feel. Pt encouraged to speak with Dr about concerns.

## 2021-07-24 NOTE — PROGRESS NOTES
Daily Progress Note  7/24/2021    Patient Name: Bernie Galeazzi    CHIEF COMPLAINT: Depression with suicidal ideation         SUBJECTIVE:      Patient is seen today for a follow up assessment. Alma Cheney is interviewed at the nurses station. He reports that he took the evening dose of Depakote last night and then it kept him awake all night. He reports that he did not fall asleep until 3 AM and slept very little. He is again requesting his sleep medication in addition to muscle relaxer. He also reports that he would like to discharge tomorrow rather than discharge directly to [facility] on Thursday. He states that he will follow-up on his own at his appointment. We discussed that at his last discharge he verbalizes same agreement and failed to follow through. This is also true for multiple previous admissions. We discussed the importance of creating a realistic scenario to help him stay compliant with medications and that he would not be discharged until Tuesday. He reports frustration with this. He reports \"I am doing good\". He is challenged on the fact that he is doing good because he is asking for multiple additional medications. He becomes defensive though remains in behavioral control.       Appetite:  [x] Adequate/Unchanged  [] Increased  [] Decreased      Sleep:       [] Adequate/Unchanged  [] Fair  [x] Poor      Group Attendance on Unit:   [] Yes  [x] Selectively    [] No    Medication Side Effects: Denies         Mental Status Exam  Level of consciousness: Alert and awake   Appearance: Appropriate attire for setting, standing, with fair  grooming and hygiene   Behavior/Motor: Intrusive though slightly more redirectable, no psychomotor abnormalities   Attitude toward examiner: Mostly cooperative, attentive, intense eye contact  Speech: Low tone, normal rate and volume  Mood: \"I am good\"  Affect: Superficial, somewhat demanding  Thought processes: linear, coherent and tangential   Thought content: Discharge and medication focus, Denies homicidal ideation  Suicidal Ideation: Reports improvement in suicidal ideations, contracts for safety on the unit. Delusions: No evidence of delusions. Perceptual Disturbance: Denies, patient does not appear to be responding to internal stimuli. Cognition: Oriented to self, location, time, and situation  Memory: intact  Insight: poor   Judgement: poor       Data   height is 5' 9\" (1.753 m) and weight is 227 lb (103 kg). His temperature is 98 °F (36.7 °C). His blood pressure is 133/85 and his pulse is 95. His respiration is 16 and oxygen saturation is 96%.    Labs:   Admission on 07/21/2021   Component Date Value Ref Range Status    WBC 07/21/2021 6.6  3.5 - 11.0 k/uL Final    RBC 07/21/2021 4.32* 4.5 - 5.9 m/uL Final    Hemoglobin 07/21/2021 13.0* 13.5 - 17.5 g/dL Final    Hematocrit 07/21/2021 38.4* 41 - 53 % Final    MCV 07/21/2021 89.0  80 - 100 fL Final    MCH 07/21/2021 30.2  26 - 34 pg Final    MCHC 07/21/2021 33.9  31 - 37 g/dL Final    RDW 07/21/2021 14.3  11.5 - 14.9 % Final    Platelets 80/01/6904 280  150 - 450 k/uL Final    MPV 07/21/2021 7.8  6.0 - 12.0 fL Final    NRBC Automated 07/21/2021 NOT REPORTED  per 100 WBC Final    Differential Type 07/21/2021 NOT REPORTED   Final    Seg Neutrophils 07/21/2021 65  36 - 66 % Final    Lymphocytes 07/21/2021 27  24 - 44 % Final    Monocytes 07/21/2021 7  1 - 7 % Final    Eosinophils % 07/21/2021 1  0 - 4 % Final    Basophils 07/21/2021 0  0 - 2 % Final    Immature Granulocytes 07/21/2021 NOT REPORTED  0 % Final    Segs Absolute 07/21/2021 4.20  1.3 - 9.1 k/uL Final    Absolute Lymph # 07/21/2021 1.80  1.0 - 4.8 k/uL Final    Absolute Mono # 07/21/2021 0.50  0.1 - 1.3 k/uL Final    Absolute Eos # 07/21/2021 0.10  0.0 - 0.4 k/uL Final    Basophils Absolute 07/21/2021 0.00  0.0 - 0.2 k/uL Final    Absolute Immature Granulocyte 07/21/2021 NOT REPORTED  0.00 - 0.30 k/uL Final    WBC Morphology 07/21/2021 NOT REPORTED   Final    RBC Morphology 07/21/2021 NOT REPORTED   Final    Platelet Estimate 37/44/8856 NOT REPORTED   Final    Glucose 07/21/2021 105* 70 - 99 mg/dL Final    BUN 07/21/2021 9  6 - 20 mg/dL Final    CREATININE 07/21/2021 0.62* 0.70 - 1.20 mg/dL Final    Bun/Cre Ratio 07/21/2021 NOT REPORTED  9 - 20 Final    Calcium 07/21/2021 9.7  8.6 - 10.4 mg/dL Final    Sodium 07/21/2021 139  135 - 144 mmol/L Final    Potassium 07/21/2021 3.7  3.7 - 5.3 mmol/L Final    Chloride 07/21/2021 101  98 - 107 mmol/L Final    CO2 07/21/2021 26  20 - 31 mmol/L Final    Anion Gap 07/21/2021 12  9 - 17 mmol/L Final    Alkaline Phosphatase 07/21/2021 68  40 - 129 U/L Final    ALT 07/21/2021 21  5 - 41 U/L Final    AST 07/21/2021 30  <40 U/L Final    Total Bilirubin 07/21/2021 0.27* 0.3 - 1.2 mg/dL Final    Total Protein 07/21/2021 7.7  6.4 - 8.3 g/dL Final    Albumin 07/21/2021 4.3  3.5 - 5.2 g/dL Final    Albumin/Globulin Ratio 07/21/2021 NOT REPORTED  1.0 - 2.5 Final    GFR Non- 07/21/2021 >60  >60 mL/min Final    GFR  07/21/2021 >60  >60 mL/min Final    GFR Comment 07/21/2021        Final    Comment: Average GFR for 30-36 years old:   80 mL/min/1.73sq m  Chronic Kidney Disease:   <60 mL/min/1.73sq m  Kidney failure:   <15 mL/min/1.73sq m              eGFR calculated using average adult body mass.  Additional eGFR calculator available at:        Unsubscribe.com.br            GFR Staging 07/21/2021 NOT REPORTED   Final    Acetaminophen Level 07/21/2021 <5* 10 - 30 ug/mL Final    Ethanol 07/21/2021 <10  <10 mg/dL Final    Ethanol percent 07/21/2021 <4.588  % Final    Salicylate Lvl 35/58/9154 <1* 3 - 10 mg/dL Final    Ventricular Rate 07/21/2021 82  BPM Final    Atrial Rate 07/21/2021 82  BPM Final    P-R Interval 07/21/2021 148  ms Final    QRS Duration 07/21/2021 92  ms Final    Q-T Interval 07/21/2021 362  ms Final    QTc Calculation (Bazett) 07/21/2021 422  ms Final    P Axis 07/21/2021 46  degrees Final    R Axis 07/21/2021 51  degrees Final    T Axis 07/21/2021 46  degrees Final    Specimen Description 07/21/2021 . NASOPHARYNGEAL SWAB   Final    SARS-CoV-2, Rapid 07/21/2021 Not Detected  Not Detected Final    Comment:       Rapid NAAT:  The specimen is NEGATIVE for SARS-CoV-2, the novel coronavirus associated with   COVID-19. The ID NOW COVID-19 assay is designed to detect the virus that causes COVID-19 in patients   with signs and symptoms of infection who are suspected of COVID-19. An individual without symptoms of COVID-19 and who is not shedding SARS-CoV-2 virus would   expect to have a negative (not detected) result in this assay. Negative results should be treated as presumptive and, if inconsistent with clinical signs   and symptoms or necessary for patient management,  should be tested with an alternative molecular assay. Negative results do not preclude   SARS-CoV-2 infection and   should not be used as the sole basis for patient management decisions.          Fact sheet for Healthcare Providers: Jyothi.margo  Fact sheet for Patients: Jyothi.es          Methodology: Isothermal Nucleic Acid Amplification      Amphetamine Screen, Ur 07/22/2021 NEGATIVE  NEGATIVE Final    Comment:       (Positive cutoff 1000 ng/mL)                  Barbiturate Screen, Ur 07/22/2021 NEGATIVE  NEGATIVE Final    Comment:       (Positive cutoff 200 ng/mL)                  Benzodiazepine Screen, Urine 07/22/2021 NEGATIVE  NEGATIVE Final    Comment:       (Positive cutoff 200 ng/mL)                  Cocaine Metabolite, Urine 07/22/2021 NEGATIVE  NEGATIVE Final    Comment:       (Positive cutoff 300 ng/mL)                  Methadone Screen, Urine 07/22/2021 NEGATIVE  NEGATIVE Final    Comment:       (Positive cutoff 300 ng/mL)                  Opiates, Urine 07/22/2021 NEGATIVE  NEGATIVE Final    Comment:       (Positive cutoff 300 ng/mL)                  Phencyclidine, Urine 07/22/2021 NEGATIVE  NEGATIVE Final    Comment:       (Positive cutoff 25 ng/mL)                  Propoxyphene, Urine 07/22/2021 NOT REPORTED  NEGATIVE Final    Cannabinoid Scrn, Ur 07/22/2021 NEGATIVE  NEGATIVE Final    Comment:       (Positive cutoff 50 ng/mL)                  Oxycodone Screen, Ur 07/22/2021 NEGATIVE  NEGATIVE Final    Comment:       (Positive cutoff 100 ng/mL)                  Methamphetamine, Urine 07/22/2021 NOT REPORTED  NEGATIVE Final    Tricyclic Antidepressants, Urine 07/22/2021 NOT REPORTED  NEGATIVE Final    MDMA, Urine 07/22/2021 NOT REPORTED  NEGATIVE Final    Buprenorphine Urine 07/22/2021 NOT REPORTED  NEGATIVE Final    Test Information 07/22/2021 Assay provides medical screening only. The absence of expected drug(s) and/or metabolite(s) may indicate diluted or adulterated urine, limitations of testing or timing of collection. Final    Comment: Testing for legal purposes should be confirmed by another method. To request confirmation   of test result, please call the lab within 7 days of sample submission. Reviewed patient's current plan of care and vital signs with nursing staff.     Labs reviewed: [x] Yes  EKG reviewed: QTC = 422 on 7/21/2021    Medications  Current Facility-Administered Medications: nicotine polacrilex (NICORETTE) gum 2 mg, 2 mg, Oral, PRN  divalproex (DEPAKOTE SPRINKLE) capsule 250 mg, 250 mg, Oral, 3 times per day  acetaminophen (TYLENOL) tablet 650 mg, 650 mg, Oral, Q4H PRN  polyethylene glycol (GLYCOLAX) packet 17 g, 17 g, Oral, Daily PRN  hydrOXYzine (ATARAX) tablet 50 mg, 50 mg, Oral, TID PRN  DULoxetine (CYMBALTA) extended release capsule 30 mg, 30 mg, Oral, Daily  fenofibrate (TRIGLIDE) tablet 160 mg, 160 mg, Oral, Daily  metFORMIN (GLUCOPHAGE) tablet 500 mg, 500 mg, Oral, BID WC  therapeutic multivitamin-minerals 1

## 2021-07-25 PROCEDURE — 6370000000 HC RX 637 (ALT 250 FOR IP): Performed by: PSYCHIATRY & NEUROLOGY

## 2021-07-25 PROCEDURE — 6370000000 HC RX 637 (ALT 250 FOR IP): Performed by: NURSE PRACTITIONER

## 2021-07-25 PROCEDURE — 1240000000 HC EMOTIONAL WELLNESS R&B

## 2021-07-25 PROCEDURE — 99232 SBSQ HOSP IP/OBS MODERATE 35: CPT | Performed by: NURSE PRACTITIONER

## 2021-07-25 RX ADMIN — NICOTINE POLACRILEX 2 MG: 2 GUM, CHEWING BUCCAL at 10:15

## 2021-07-25 RX ADMIN — GABAPENTIN 600 MG: 300 CAPSULE ORAL at 08:13

## 2021-07-25 RX ADMIN — DULOXETINE 30 MG: 30 CAPSULE, DELAYED RELEASE ORAL at 08:13

## 2021-07-25 RX ADMIN — NICOTINE POLACRILEX 2 MG: 2 GUM, CHEWING BUCCAL at 13:20

## 2021-07-25 RX ADMIN — NICOTINE POLACRILEX 2 MG: 2 GUM, CHEWING BUCCAL at 17:15

## 2021-07-25 RX ADMIN — HYDROXYZINE HYDROCHLORIDE 50 MG: 50 TABLET, FILM COATED ORAL at 08:13

## 2021-07-25 RX ADMIN — METFORMIN HYDROCHLORIDE 500 MG: 500 TABLET ORAL at 08:13

## 2021-07-25 RX ADMIN — METFORMIN HYDROCHLORIDE 500 MG: 500 TABLET ORAL at 17:15

## 2021-07-25 RX ADMIN — NICOTINE POLACRILEX 2 MG: 2 GUM, CHEWING BUCCAL at 15:40

## 2021-07-25 RX ADMIN — NICOTINE POLACRILEX 2 MG: 2 GUM, CHEWING BUCCAL at 11:20

## 2021-07-25 RX ADMIN — FENOFIBRATE 160 MG: 160 TABLET ORAL at 08:16

## 2021-07-25 RX ADMIN — TRAZODONE HYDROCHLORIDE 150 MG: 150 TABLET ORAL at 20:08

## 2021-07-25 RX ADMIN — ARIPIPRAZOLE 15 MG: 15 TABLET ORAL at 08:13

## 2021-07-25 RX ADMIN — Medication 1 TABLET: at 08:13

## 2021-07-25 RX ADMIN — PANTOPRAZOLE SODIUM 40 MG: 40 TABLET, DELAYED RELEASE ORAL at 08:13

## 2021-07-25 RX ADMIN — GABAPENTIN 600 MG: 300 CAPSULE ORAL at 20:08

## 2021-07-25 RX ADMIN — GABAPENTIN 600 MG: 300 CAPSULE ORAL at 12:28

## 2021-07-25 RX ADMIN — NICOTINE POLACRILEX 2 MG: 2 GUM, CHEWING BUCCAL at 12:28

## 2021-07-25 RX ADMIN — NICOTINE POLACRILEX 2 MG: 2 GUM, CHEWING BUCCAL at 07:50

## 2021-07-25 RX ADMIN — HYDROXYZINE HYDROCHLORIDE 50 MG: 50 TABLET, FILM COATED ORAL at 20:08

## 2021-07-25 RX ADMIN — NICOTINE POLACRILEX 2 MG: 2 GUM, CHEWING BUCCAL at 08:54

## 2021-07-25 RX ADMIN — NICOTINE POLACRILEX 2 MG: 2 GUM, CHEWING BUCCAL at 14:21

## 2021-07-25 RX ADMIN — BUPRENORPHINE AND NALOXONE 1 FILM: 8; 2 FILM BUCCAL; SUBLINGUAL at 08:13

## 2021-07-25 RX ADMIN — NICOTINE POLACRILEX 2 MG: 2 GUM, CHEWING BUCCAL at 18:27

## 2021-07-25 RX ADMIN — HYDROXYZINE HYDROCHLORIDE 50 MG: 50 TABLET, FILM COATED ORAL at 14:23

## 2021-07-25 RX ADMIN — CLONIDINE HYDROCHLORIDE 0.1 MG: 0.1 TABLET ORAL at 20:08

## 2021-07-25 NOTE — PLAN OF CARE
Problem: Altered Mood, Depressive Behavior:  Goal: Able to verbalize and/or display a decrease in depressive symptoms  Description: Able to verbalize and/or display a decrease in depressive symptoms  7/25/2021 0153 by Josh Trevino RN  Outcome: Ongoing   Patient denies depression and anxiety. Patient is medication focused. Patient verbalizes readiness for discharge. Patient is compliant with his medications. Problem: Altered Mood, Depressive Behavior:  Goal: Absence of self-harm  Description: Absence of self-harm  7/25/2021 0153 by Josh Trevino RN  Outcome: Ongoing   Patient denies thoughts of self harm. Patient agrees to notify staff if symptoms arise. Patient remains safe on unit. Patient safety maintained q15 minute checks.

## 2021-07-25 NOTE — GROUP NOTE
Group Therapy Note    Date: 7/25/2021    Group Start Time: 0900  Group End Time: 0930  Group Topic: Community Meeting    TIESHA CAMOPS    Elvia Resendez RN        Group Therapy Note    Attendees: 6/19         Patient's Goal: Talk to doctor about discharge    Notes:     Status After Intervention:  Improved    Participation Level:  Active Listener    Participation Quality: Appropriate, Attentive and Sharing      Speech: normal      Thought Process/Content: Logical      Affective Functioning: Congruent      Mood: Appropriate      Level of consciousness: Alert and Oriented x4      Response to Learning: Able to verbalize current knowledge/experience      Endings: None Reported    Modes of Intervention: Support, Socialization and Exploration      Discipline Responsible: Registered Nurse      Signature:  Elvia Resendez RN

## 2021-07-25 NOTE — GROUP NOTE
Group Therapy Note    Date: 7/25/2021    Group Start Time: 1330  Group End Time: 7542  Group Topic: Music Therapy    TIESHA CAMPOS    Arthur Ron        Group Therapy Note    Attendees: 7/18         Patient's Goal:  Patient shared music and answered questions relating to lyrics and themes within songs, as asked by this writer; Goals to increase self-expression, provide leisure opportunities; and Normalize the environment    Notes:  Patient attended and participated in group, requseting music and egnaging postiviely with peers and saff. During second half of group, left twice and returned a few minutes later. Observed to be singing along throughout group    Status After Intervention:  Improved    Participation Level:  Active Listener and Interactive    Participation Quality: Appropriate, Attentive and Sharing      Speech:  normal      Thought Process/Content: Logical  Linear      Affective Functioning: Constricted/Restricted    Mood: euthymic      Level of consciousness:  Alert and Attentive      Response to Learning: Able to verbalize current knowledge/experience and Progressing to goal      Endings: None Reported    Modes of Intervention: Socialization, Exploration, Activity, Media and Reality-testing      Discipline Responsible: Psychoeducational Specialist      Signature:  Arthur Ron

## 2021-07-25 NOTE — PROGRESS NOTES
Daily Progress Note  7/25/2021    Patient Name: Araeblla Manuel    CHIEF COMPLAINT: Depression with suicidal ideation         SUBJECTIVE:      Patient is seen today for a follow up assessment. He was observed interactive playing cards with peers on the milieu. Staff reports that he has been approaching desk with multiple requests all day long. They report that he is again focused on discharging tomorrow. Patient did observe that this author requesting to talk. He again is asking about discharge tomorrow. We discussed again that the plan is for him to discharge on Tuesday straight to [facility] for MAT. He understands that Dr. Siobhan Baires will return tomorrow and that any change in the discharge plan would be between the two of them. He reports that he did sleep better last night with the increase in his trazodone. He is again requesting Benadryl for sleep after he advises Jose Miguel Diaz that he was sleeping well. The request for Benadryl was denied, patient was accepting of this response and understands that he can have further discussion with regard to his discharge plan tomorrow.     Appetite:  [x] Adequate/Unchanged  [] Increased  [] Decreased      Sleep:       [x] Adequate/Unchanged  [] Fair  [] Poor      Group Attendance on Unit:   [x] Yes  [] Selectively    [] No    Medication Side Effects: Continues to refuse Depakote, does not like the way it makes him feel         Mental Status Exam  Level of consciousness: Alert and awake   Appearance: Appropriate attire for setting, standing, with fair  grooming and hygiene   Behavior/Motor: Approachable, intrusive though easier to redirect  Attitude toward examiner: Cooperative, attentive, good eye contact  Speech: spontaneous, normal rate, normal volume and well articulated   Mood: Anxious  Affect: Congruent, manipulative  Thought processes: linear and coherent   Thought content: Discharge focused, Denies homicidal ideation  Suicidal Ideation: Reports improvement in suicidal ideations, contracts for safety on the unit. Delusions: No evidence of delusions. Perceptual Disturbance: Denies, patient does not appear to be responding to internal stimuli. Cognition: Oriented to self, location, time, and situation  Memory: intact  Insight: poor   Judgement: poor       Data   height is 5' 9\" (1.753 m) and weight is 227 lb (103 kg). His oral temperature is 97.8 °F (36.6 °C). His blood pressure is 135/64 and his pulse is 80. His respiration is 14 and oxygen saturation is 96%.    Labs:   Admission on 07/21/2021   Component Date Value Ref Range Status    WBC 07/21/2021 6.6  3.5 - 11.0 k/uL Final    RBC 07/21/2021 4.32* 4.5 - 5.9 m/uL Final    Hemoglobin 07/21/2021 13.0* 13.5 - 17.5 g/dL Final    Hematocrit 07/21/2021 38.4* 41 - 53 % Final    MCV 07/21/2021 89.0  80 - 100 fL Final    MCH 07/21/2021 30.2  26 - 34 pg Final    MCHC 07/21/2021 33.9  31 - 37 g/dL Final    RDW 07/21/2021 14.3  11.5 - 14.9 % Final    Platelets 04/07/0259 280  150 - 450 k/uL Final    MPV 07/21/2021 7.8  6.0 - 12.0 fL Final    NRBC Automated 07/21/2021 NOT REPORTED  per 100 WBC Final    Differential Type 07/21/2021 NOT REPORTED   Final    Seg Neutrophils 07/21/2021 65  36 - 66 % Final    Lymphocytes 07/21/2021 27  24 - 44 % Final    Monocytes 07/21/2021 7  1 - 7 % Final    Eosinophils % 07/21/2021 1  0 - 4 % Final    Basophils 07/21/2021 0  0 - 2 % Final    Immature Granulocytes 07/21/2021 NOT REPORTED  0 % Final    Segs Absolute 07/21/2021 4.20  1.3 - 9.1 k/uL Final    Absolute Lymph # 07/21/2021 1.80  1.0 - 4.8 k/uL Final    Absolute Mono # 07/21/2021 0.50  0.1 - 1.3 k/uL Final    Absolute Eos # 07/21/2021 0.10  0.0 - 0.4 k/uL Final    Basophils Absolute 07/21/2021 0.00  0.0 - 0.2 k/uL Final    Absolute Immature Granulocyte 07/21/2021 NOT REPORTED  0.00 - 0.30 k/uL Final    WBC Morphology 07/21/2021 NOT REPORTED   Final    RBC Morphology 07/21/2021 NOT REPORTED   Final    Platelet Estimate 07/21/2021 NOT REPORTED   Final    Glucose 07/21/2021 105* 70 - 99 mg/dL Final    BUN 07/21/2021 9  6 - 20 mg/dL Final    CREATININE 07/21/2021 0.62* 0.70 - 1.20 mg/dL Final    Bun/Cre Ratio 07/21/2021 NOT REPORTED  9 - 20 Final    Calcium 07/21/2021 9.7  8.6 - 10.4 mg/dL Final    Sodium 07/21/2021 139  135 - 144 mmol/L Final    Potassium 07/21/2021 3.7  3.7 - 5.3 mmol/L Final    Chloride 07/21/2021 101  98 - 107 mmol/L Final    CO2 07/21/2021 26  20 - 31 mmol/L Final    Anion Gap 07/21/2021 12  9 - 17 mmol/L Final    Alkaline Phosphatase 07/21/2021 68  40 - 129 U/L Final    ALT 07/21/2021 21  5 - 41 U/L Final    AST 07/21/2021 30  <40 U/L Final    Total Bilirubin 07/21/2021 0.27* 0.3 - 1.2 mg/dL Final    Total Protein 07/21/2021 7.7  6.4 - 8.3 g/dL Final    Albumin 07/21/2021 4.3  3.5 - 5.2 g/dL Final    Albumin/Globulin Ratio 07/21/2021 NOT REPORTED  1.0 - 2.5 Final    GFR Non- 07/21/2021 >60  >60 mL/min Final    GFR  07/21/2021 >60  >60 mL/min Final    GFR Comment 07/21/2021        Final    Comment: Average GFR for 30-36 years old:   80 mL/min/1.73sq m  Chronic Kidney Disease:   <60 mL/min/1.73sq m  Kidney failure:   <15 mL/min/1.73sq m              eGFR calculated using average adult body mass.  Additional eGFR calculator available at:        [employer]            GFR Staging 07/21/2021 NOT REPORTED   Final    Acetaminophen Level 07/21/2021 <5* 10 - 30 ug/mL Final    Ethanol 07/21/2021 <10  <10 mg/dL Final    Ethanol percent 07/21/2021 <7.174  % Final    Salicylate Lvl 46/09/0762 <1* 3 - 10 mg/dL Final    Ventricular Rate 07/21/2021 82  BPM Final    Atrial Rate 07/21/2021 82  BPM Final    P-R Interval 07/21/2021 148  ms Final    QRS Duration 07/21/2021 92  ms Final    Q-T Interval 07/21/2021 362  ms Final    QTc Calculation (Bazett) 07/21/2021 422  ms Final    P Axis 07/21/2021 46  degrees Final    R Axis 07/21/2021 51  degrees Final    T Axis 07/21/2021 46  degrees Final    Specimen Description 07/21/2021 . NASOPHARYNGEAL SWAB   Final    SARS-CoV-2, Rapid 07/21/2021 Not Detected  Not Detected Final    Comment:       Rapid NAAT:  The specimen is NEGATIVE for SARS-CoV-2, the novel coronavirus associated with   COVID-19. The ID NOW COVID-19 assay is designed to detect the virus that causes COVID-19 in patients   with signs and symptoms of infection who are suspected of COVID-19. An individual without symptoms of COVID-19 and who is not shedding SARS-CoV-2 virus would   expect to have a negative (not detected) result in this assay. Negative results should be treated as presumptive and, if inconsistent with clinical signs   and symptoms or necessary for patient management,  should be tested with an alternative molecular assay. Negative results do not preclude   SARS-CoV-2 infection and   should not be used as the sole basis for patient management decisions.          Fact sheet for Healthcare Providers: Darian  Fact sheet for Patients: Darian          Methodology: Isothermal Nucleic Acid Amplification      Amphetamine Screen, Ur 07/22/2021 NEGATIVE  NEGATIVE Final    Comment:       (Positive cutoff 1000 ng/mL)                  Barbiturate Screen, Ur 07/22/2021 NEGATIVE  NEGATIVE Final    Comment:       (Positive cutoff 200 ng/mL)                  Benzodiazepine Screen, Urine 07/22/2021 NEGATIVE  NEGATIVE Final    Comment:       (Positive cutoff 200 ng/mL)                  Cocaine Metabolite, Urine 07/22/2021 NEGATIVE  NEGATIVE Final    Comment:       (Positive cutoff 300 ng/mL)                  Methadone Screen, Urine 07/22/2021 NEGATIVE  NEGATIVE Final    Comment:       (Positive cutoff 300 ng/mL)                  Opiates, Urine 07/22/2021 NEGATIVE  NEGATIVE Final    Comment:       (Positive cutoff 300 ng/mL)                  Phencyclidine, Urine 07/22/2021 NEGATIVE  NEGATIVE Final    Comment:       (Positive cutoff 25 ng/mL)                  Propoxyphene, Urine 07/22/2021 NOT REPORTED  NEGATIVE Final    Cannabinoid Scrn, Ur 07/22/2021 NEGATIVE  NEGATIVE Final    Comment:       (Positive cutoff 50 ng/mL)                  Oxycodone Screen, Ur 07/22/2021 NEGATIVE  NEGATIVE Final    Comment:       (Positive cutoff 100 ng/mL)                  Methamphetamine, Urine 07/22/2021 NOT REPORTED  NEGATIVE Final    Tricyclic Antidepressants, Urine 07/22/2021 NOT REPORTED  NEGATIVE Final    MDMA, Urine 07/22/2021 NOT REPORTED  NEGATIVE Final    Buprenorphine Urine 07/22/2021 NOT REPORTED  NEGATIVE Final    Test Information 07/22/2021 Assay provides medical screening only. The absence of expected drug(s) and/or metabolite(s) may indicate diluted or adulterated urine, limitations of testing or timing of collection. Final    Comment: Testing for legal purposes should be confirmed by another method. To request confirmation   of test result, please call the lab within 7 days of sample submission. Reviewed patient's current plan of care and vital signs with nursing staff.     Labs reviewed: [x] Yes  EKG reviewed,  on 7/21/2021    Medications  Current Facility-Administered Medications: traZODone (DESYREL) tablet 150 mg, 150 mg, Oral, Nightly  nicotine polacrilex (NICORETTE) gum 2 mg, 2 mg, Oral, PRN  divalproex (DEPAKOTE SPRINKLE) capsule 250 mg, 250 mg, Oral, 3 times per day  acetaminophen (TYLENOL) tablet 650 mg, 650 mg, Oral, Q4H PRN  polyethylene glycol (GLYCOLAX) packet 17 g, 17 g, Oral, Daily PRN  hydrOXYzine (ATARAX) tablet 50 mg, 50 mg, Oral, TID PRN  DULoxetine (CYMBALTA) extended release capsule 30 mg, 30 mg, Oral, Daily  fenofibrate (TRIGLIDE) tablet 160 mg, 160 mg, Oral, Daily  metFORMIN (GLUCOPHAGE) tablet 500 mg, 500 mg, Oral, BID WC  therapeutic multivitamin-minerals 1 tablet, 1 tablet, Oral, Daily  pantoprazole (PROTONIX) tablet 40 mg, 40 mg, Oral, QAM AC  cloNIDine (CATAPRES) tablet 0.1 mg, 0.1 mg, Oral, TID PRN  ondansetron (ZOFRAN) tablet 4 mg, 4 mg, Oral, Q8H PRN  buprenorphine-naloxone (SUBOXONE) 8-2 MG SL film 1 Film, 1 Film, Sublingual, Daily  gabapentin (NEURONTIN) capsule 600 mg, 600 mg, Oral, TID  ARIPiprazole (ABILIFY) tablet 15 mg, 15 mg, Oral, Daily    ASSESSMENT  Schizoaffective disorder, bipolar type ([facility]         PLAN  Patient symptoms are: Improved  Continue current medication regimen. Monitor need and frequency of PRN medications. Encourage participation in groups and milieu. Attempt to develop insight. Psycho-education conducted. Supportive Therapy conducted. Probable discharge is per attending physician. We will likely discharge in the next 1 to 2 days to outpatient follow-up appointment. Follow-up daily while inpatient. Patient continues to be monitored in the inpatient psychiatric facility at [facility] for safety and stabilization. Patient continues to need, on a daily basis, active treatment furnished directly by or requiring the supervision of inpatient psychiatric personnel. Electronically signed by MASSIEL Membreno CNP on 7/25/2021 at 4:19 PM    **This report has been created using voice recognition software. It may contain minor errors which are inherent in voice recognition technology. **

## 2021-07-25 NOTE — PLAN OF CARE
Problem: Altered Mood, Depressive Behavior:  Goal: Able to verbalize and/or display a decrease in depressive symptoms  Description: Able to verbalize and/or display a decrease in depressive symptoms  7/25/2021 0909 by Anna Ramos RN  Outcome: Ongoing   1:1 with pt x ten minutes. Pt encouraged to attend unit programming and interact with peers and staff. Pt also encouraged to tend to hygiene and ADLs. Pt encouraged to discuss feelings with staff and feedback and reassurance provided. Pt denies thoughts of self harm and is agreeable to seeking out should thoughts of self harm arise. Safe environment maintained. Q15 minute checks for safety cont per unit policy. Will cont to monitor for safety and provides support and reassurance as needed. Pt focused on discharge. Lacks insight into illness or need to be discharged on Tuesday. Redirection needed at times.

## 2021-07-26 PROCEDURE — 2580000003 HC RX 258: Performed by: PSYCHIATRY & NEUROLOGY

## 2021-07-26 PROCEDURE — 2500000003 HC RX 250 WO HCPCS: Performed by: PSYCHIATRY & NEUROLOGY

## 2021-07-26 PROCEDURE — APPSS30 APP SPLIT SHARED TIME 16-30 MINUTES: Performed by: NURSE PRACTITIONER

## 2021-07-26 PROCEDURE — 99232 SBSQ HOSP IP/OBS MODERATE 35: CPT | Performed by: PSYCHIATRY & NEUROLOGY

## 2021-07-26 PROCEDURE — 6370000000 HC RX 637 (ALT 250 FOR IP): Performed by: NURSE PRACTITIONER

## 2021-07-26 PROCEDURE — 6370000000 HC RX 637 (ALT 250 FOR IP): Performed by: PSYCHIATRY & NEUROLOGY

## 2021-07-26 PROCEDURE — 90833 PSYTX W PT W E/M 30 MIN: CPT | Performed by: PSYCHIATRY & NEUROLOGY

## 2021-07-26 PROCEDURE — 1240000000 HC EMOTIONAL WELLNESS R&B

## 2021-07-26 RX ORDER — GABAPENTIN 300 MG/1
600 CAPSULE ORAL 3 TIMES DAILY
Qty: 90 CAPSULE | Refills: 0 | Status: SHIPPED | OUTPATIENT
Start: 2021-07-26 | End: 2021-08-19

## 2021-07-26 RX ORDER — M-VIT,TX,IRON,MINS/CALC/FOLIC 27MG-0.4MG
1 TABLET ORAL DAILY
Qty: 30 TABLET | Refills: 0 | Status: ON HOLD | OUTPATIENT
Start: 2021-07-26 | End: 2021-11-22 | Stop reason: HOSPADM

## 2021-07-26 RX ORDER — DULOXETIN HYDROCHLORIDE 30 MG/1
30 CAPSULE, DELAYED RELEASE ORAL DAILY
Qty: 30 CAPSULE | Refills: 0 | Status: SHIPPED | OUTPATIENT
Start: 2021-07-26 | End: 2021-08-19

## 2021-07-26 RX ORDER — TRAZODONE HYDROCHLORIDE 150 MG/1
150 TABLET ORAL NIGHTLY
Qty: 30 TABLET | Refills: 0 | Status: ON HOLD | OUTPATIENT
Start: 2021-07-26 | End: 2021-10-30

## 2021-07-26 RX ORDER — CLONIDINE HYDROCHLORIDE 0.1 MG/1
0.1 TABLET ORAL 2 TIMES DAILY
Qty: 60 TABLET | Refills: 3 | Status: SHIPPED | OUTPATIENT
Start: 2021-07-26 | End: 2021-08-13 | Stop reason: SDUPTHER

## 2021-07-26 RX ORDER — FENOFIBRATE 160 MG/1
160 TABLET ORAL DAILY
Qty: 30 TABLET | Refills: 0 | Status: SHIPPED | OUTPATIENT
Start: 2021-07-26 | End: 2021-08-19

## 2021-07-26 RX ORDER — DIVALPROEX SODIUM 125 MG/1
250 CAPSULE, COATED PELLETS ORAL EVERY 8 HOURS SCHEDULED
Qty: 180 CAPSULE | Refills: 0 | Status: SHIPPED | OUTPATIENT
Start: 2021-07-26 | End: 2021-08-13 | Stop reason: SDUPTHER

## 2021-07-26 RX ORDER — ARIPIPRAZOLE 15 MG/1
15 TABLET ORAL DAILY
Qty: 30 TABLET | Refills: 3 | Status: SHIPPED | OUTPATIENT
Start: 2021-07-27 | End: 2021-08-13 | Stop reason: SDUPTHER

## 2021-07-26 RX ORDER — PANTOPRAZOLE SODIUM 40 MG/1
40 TABLET, DELAYED RELEASE ORAL
Qty: 30 TABLET | Refills: 0 | Status: SHIPPED | OUTPATIENT
Start: 2021-07-26 | End: 2021-08-19 | Stop reason: SDUPTHER

## 2021-07-26 RX ORDER — HYDROXYZINE 50 MG/1
50 TABLET, FILM COATED ORAL 3 TIMES DAILY PRN
Qty: 30 TABLET | Refills: 0 | Status: SHIPPED | OUTPATIENT
Start: 2021-07-26 | End: 2021-08-05

## 2021-07-26 RX ORDER — OLANZAPINE 10 MG/1
10 INJECTION, POWDER, LYOPHILIZED, FOR SOLUTION INTRAMUSCULAR ONCE
Status: COMPLETED | OUTPATIENT
Start: 2021-07-26 | End: 2021-07-26

## 2021-07-26 RX ORDER — BUPRENORPHINE AND NALOXONE 8; 2 MG/1; MG/1
1 FILM, SOLUBLE BUCCAL; SUBLINGUAL DAILY
Qty: 1 FILM | Refills: 0
Start: 2021-07-27 | End: 2021-07-28

## 2021-07-26 RX ADMIN — BUPRENORPHINE AND NALOXONE 1 FILM: 8; 2 FILM BUCCAL; SUBLINGUAL at 08:11

## 2021-07-26 RX ADMIN — FENOFIBRATE 160 MG: 160 TABLET ORAL at 08:11

## 2021-07-26 RX ADMIN — NICOTINE POLACRILEX 2 MG: 2 GUM, CHEWING BUCCAL at 08:13

## 2021-07-26 RX ADMIN — TRAZODONE HYDROCHLORIDE 150 MG: 150 TABLET ORAL at 21:12

## 2021-07-26 RX ADMIN — Medication 1 TABLET: at 08:11

## 2021-07-26 RX ADMIN — OLANZAPINE 10 MG: 10 INJECTION, POWDER, LYOPHILIZED, FOR SOLUTION INTRAMUSCULAR at 16:29

## 2021-07-26 RX ADMIN — NICOTINE POLACRILEX 2 MG: 2 GUM, CHEWING BUCCAL at 14:34

## 2021-07-26 RX ADMIN — METFORMIN HYDROCHLORIDE 500 MG: 500 TABLET ORAL at 08:11

## 2021-07-26 RX ADMIN — METFORMIN HYDROCHLORIDE 500 MG: 500 TABLET ORAL at 17:14

## 2021-07-26 RX ADMIN — NICOTINE POLACRILEX 2 MG: 2 GUM, CHEWING BUCCAL at 17:31

## 2021-07-26 RX ADMIN — PANTOPRAZOLE SODIUM 40 MG: 40 TABLET, DELAYED RELEASE ORAL at 08:11

## 2021-07-26 RX ADMIN — NICOTINE POLACRILEX 2 MG: 2 GUM, CHEWING BUCCAL at 13:33

## 2021-07-26 RX ADMIN — CLONIDINE HYDROCHLORIDE 0.1 MG: 0.1 TABLET ORAL at 21:12

## 2021-07-26 RX ADMIN — HYDROXYZINE HYDROCHLORIDE 50 MG: 50 TABLET, FILM COATED ORAL at 05:12

## 2021-07-26 RX ADMIN — NICOTINE POLACRILEX 2 MG: 2 GUM, CHEWING BUCCAL at 21:13

## 2021-07-26 RX ADMIN — ARIPIPRAZOLE 15 MG: 15 TABLET ORAL at 08:11

## 2021-07-26 RX ADMIN — GABAPENTIN 600 MG: 300 CAPSULE ORAL at 21:12

## 2021-07-26 RX ADMIN — NICOTINE POLACRILEX 2 MG: 2 GUM, CHEWING BUCCAL at 06:43

## 2021-07-26 RX ADMIN — WATER 2.1 ML: 1 INJECTION INTRAMUSCULAR; INTRAVENOUS; SUBCUTANEOUS at 16:29

## 2021-07-26 RX ADMIN — GABAPENTIN 600 MG: 300 CAPSULE ORAL at 08:11

## 2021-07-26 RX ADMIN — NICOTINE POLACRILEX 2 MG: 2 GUM, CHEWING BUCCAL at 12:12

## 2021-07-26 RX ADMIN — GABAPENTIN 600 MG: 300 CAPSULE ORAL at 14:34

## 2021-07-26 RX ADMIN — HYDROXYZINE HYDROCHLORIDE 50 MG: 50 TABLET, FILM COATED ORAL at 15:26

## 2021-07-26 RX ADMIN — DULOXETINE 30 MG: 30 CAPSULE, DELAYED RELEASE ORAL at 08:29

## 2021-07-26 RX ADMIN — HYDROXYZINE HYDROCHLORIDE 50 MG: 50 TABLET, FILM COATED ORAL at 21:12

## 2021-07-26 NOTE — GROUP NOTE
Group Therapy Note    Date: 7/26/2021    Group Start Time: 1100  Group End Time: 5263  Group Topic: Psychoeducation    [facility]    Nusrat Bledsoe        Group Therapy Note    Attendees: 10/20         Patient's Goal:  To demonstrate improved interpersonal skills     Notes:  Patient required multiple verbal redirections to stay on task and follow sequencing, attempting to take other peers turns during the activity. Patient was redirectable. Status After Intervention:  Improved    Participation Level:  Active Listener and Interactive    Participation Quality: Appropriate, Attentive and Sharing      Speech:  normal      Thought Process/Content: Logical      Affective Functioning: Congruent      Mood: euthymic      Level of consciousness:  Alert, Oriented x4 and Attentive      Response to Learning: Able to verbalize current knowledge/experience, Able to verbalize/acknowledge new learning, Capable of insight and Progressing to goal      Endings: None Reported    Modes of Intervention: Education, Support, Socialization, Exploration, Clarifying and Problem-solving      Discipline Responsible: Psychoeducational Specialist      Signature:  Eva Artis

## 2021-07-26 NOTE — GROUP NOTE
Group Therapy Note    Date: 7/26/2021    Group Start Time: 1430  Group End Time: 1500  Group Topic: Psychoeducation    TIESHA Bledsoe    Patient declined to attend coping skills group at 1430 despite encouragement from staff. 1:1 talk time offered by staff as alternative to group session.         Signature:  Richmond Pearson

## 2021-07-26 NOTE — SUICIDE SAFETY PLAN
SAFETY PLAN    A suicide Safety Plan is a document that supports someone when they are having thoughts of suicide. Warning Signs that indicate a suicidal crisis may be developing: What (situations, thoughts, feelings, body sensations, behaviors, etc.) do you experience that lets you know you are beginning to think about suicide? 1. Pain  2. suffering  3. loss    Internal Coping Strategies:  What things can I do (relaxation techniques, hobbies, physical activities, etc.) to take my mind off my problems without contacting another person? 1. pray  2. God  3. Deal with it     People and social settings that provide distraction: Who can I call or where can I go to distract me? 1. Name: Hermila Gutierrez  Phone: [phone]    People whom I can ask for help: Who can I call when I need help - for example, friends, family, clergy, someone else? 1. Name: Hermila Gutierrez                Phone: [phone]    Professionals or [address] [place], [place] [address]vd I can contact during a crisis: Who can I call for help - for example, my doctor, my psychiatrist, my psychologist, a mental health provider, a suicide hotline? 1. Suicide Prevention Lifeline: 5-732-273-TALK (0719)    2. [address] [place] [address] Emergency Services -  for example, [facility] [place] suicide hotline, [facility] Hotline: [facility]      Emergency Services Address: [facility], [address] [place] [address]      Emergency Services Phone: [phone]    Making the environment safe: How can I make my environment (house/apartment/living space) safer? For example, can I remove guns, medications, and other items? 1.  Remove guns  2. medication

## 2021-07-26 NOTE — PLAN OF CARE
Problem: Suicide risk  Goal: Provide patient with safe environment  Description: Provide patient with safe environment  7/26/2021 0854 by Daxa Gooden LPN  Outcome: Ongoing   Staff continues to provide patient with safe environment. Every 15 min checks maintained for pt safety. Problem: Altered Mood, Depressive Behavior:  Goal: Absence of self-harm  Description: Absence of self-harm  7/26/2021 0854 by Daxa Gooden LPN  Outcome: Ongoing   Patient denies having any thoughts of self harm.

## 2021-07-26 NOTE — PLAN OF CARE
Problem: Altered Mood, Depressive Behavior:  Goal: Able to verbalize and/or display a decrease in depressive symptoms  Description: Able to verbalize and/or display a decrease in depressive symptoms  7/25/2021 0153 by Christine Allen RN  Outcome: Ongoing   Patient denies depression and anxiety. Patient is medication focused. Patient verbalizes readiness for discharge. Patient is focused on being discharged tomorrow. Patient is selectively compliant with his medications. Problem: Altered Mood, Depressive Behavior:  Goal: Absence of self-harm  Description: Absence of self-harm  7/25/2021 0153 by Christine Allen RN  Outcome: Ongoing   Patient denies thoughts of self harm. Patient agrees to notify staff if symptoms arise. Patient remains safe on unit. Patient safety maintained q15 minute checks.

## 2021-07-26 NOTE — PROGRESS NOTES
Daily Progress Note  7/26/2021    Patient Name: Alexis Mendez    CHIEF COMPLAINT: Depression with suicidal ideation         SUBJECTIVE:      Patient is seen today for a follow up assessment. The patient remains discharged focused. He is thinking of reasons why he should be discharged today. He states that he has an appointment with [facility] tomorrow. He has found sober living over the weekend, and he can go there today. He states he should be discharged today because he does not want to take his belongings with him to [facility] tomorrow. He wants to take his stuff to his sober living house today, and just go to [facility] for a regular appointment tomorrow without all of his belongings. The patient continues to refuse his Depakote, stating that he does not like how it makes him feel. He states that it \"makes me feel weird and keeps me up at night. \"  He states that his mood is \"stable. \"  He is denying both depression and anxiety. His suicidal ideations have improved. Auditory and visual hallucinations are absent. The patient is active in group and milieu activities. He states that he sleeps 6 to 8 hours every night.     Appetite:  [x] Adequate/Unchanged  [] Increased  [] Decreased      Sleep:       [x] Adequate/Unchanged  [] Fair  [] Poor      Group Attendance on Unit:   [x] Yes  [] Selectively    [] No    Medication Side Effects: Continues to refuse Depakote, does not like the way it makes him feel         Mental Status Exam  Level of consciousness: Alert and awake   Appearance: Appropriate attire for setting, standing, with fair  grooming and hygiene   Behavior/Motor: Approachable, intrusive though easier to redirect  Attitude toward examiner: Cooperative, attentive, good eye contact  Speech: spontaneous, normal rate, normal volume and well articulated   Mood: Anxious  Affect: Congruent, manipulative  Thought processes: linear and coherent   Thought content: Discharge focused, Denies homicidal ideation  Suicidal Ideation: Reports improvement in suicidal ideations, contracts for safety on the unit. Delusions: No evidence of delusions. Perceptual Disturbance: Denies, patient does not appear to be responding to internal stimuli. Cognition: Oriented to self, location, time, and situation  Memory: intact  Insight: poor   Judgement: poor       Data   height is 5' 9\" (1.753 m) and weight is 227 lb (103 kg). His oral temperature is 98 °F (36.7 °C). His blood pressure is 136/85 and his pulse is 84. His respiration is 15 and oxygen saturation is 96%.    Labs:   Admission on 07/21/2021   Component Date Value Ref Range Status    WBC 07/21/2021 6.6  3.5 - 11.0 k/uL Final    RBC 07/21/2021 4.32* 4.5 - 5.9 m/uL Final    Hemoglobin 07/21/2021 13.0* 13.5 - 17.5 g/dL Final    Hematocrit 07/21/2021 38.4* 41 - 53 % Final    MCV 07/21/2021 89.0  80 - 100 fL Final    MCH 07/21/2021 30.2  26 - 34 pg Final    MCHC 07/21/2021 33.9  31 - 37 g/dL Final    RDW 07/21/2021 14.3  11.5 - 14.9 % Final    Platelets 26/47/3273 280  150 - 450 k/uL Final    MPV 07/21/2021 7.8  6.0 - 12.0 fL Final    NRBC Automated 07/21/2021 NOT REPORTED  per 100 WBC Final    Differential Type 07/21/2021 NOT REPORTED   Final    Seg Neutrophils 07/21/2021 65  36 - 66 % Final    Lymphocytes 07/21/2021 27  24 - 44 % Final    Monocytes 07/21/2021 7  1 - 7 % Final    Eosinophils % 07/21/2021 1  0 - 4 % Final    Basophils 07/21/2021 0  0 - 2 % Final    Immature Granulocytes 07/21/2021 NOT REPORTED  0 % Final    Segs Absolute 07/21/2021 4.20  1.3 - 9.1 k/uL Final    Absolute Lymph # 07/21/2021 1.80  1.0 - 4.8 k/uL Final    Absolute Mono # 07/21/2021 0.50  0.1 - 1.3 k/uL Final    Absolute Eos # 07/21/2021 0.10  0.0 - 0.4 k/uL Final    Basophils Absolute 07/21/2021 0.00  0.0 - 0.2 k/uL Final    Absolute Immature Granulocyte 07/21/2021 NOT REPORTED  0.00 - 0.30 k/uL Final    WBC Morphology 07/21/2021 NOT REPORTED   Final    RBC Morphology 07/21/2021 NOT REPORTED   Final    Platelet Estimate 33/96/6175 NOT REPORTED   Final    Glucose 07/21/2021 105* 70 - 99 mg/dL Final    BUN 07/21/2021 9  6 - 20 mg/dL Final    CREATININE 07/21/2021 0.62* 0.70 - 1.20 mg/dL Final    Bun/Cre Ratio 07/21/2021 NOT REPORTED  9 - 20 Final    Calcium 07/21/2021 9.7  8.6 - 10.4 mg/dL Final    Sodium 07/21/2021 139  135 - 144 mmol/L Final    Potassium 07/21/2021 3.7  3.7 - 5.3 mmol/L Final    Chloride 07/21/2021 101  98 - 107 mmol/L Final    CO2 07/21/2021 26  20 - 31 mmol/L Final    Anion Gap 07/21/2021 12  9 - 17 mmol/L Final    Alkaline Phosphatase 07/21/2021 68  40 - 129 U/L Final    ALT 07/21/2021 21  5 - 41 U/L Final    AST 07/21/2021 30  <40 U/L Final    Total Bilirubin 07/21/2021 0.27* 0.3 - 1.2 mg/dL Final    Total Protein 07/21/2021 7.7  6.4 - 8.3 g/dL Final    Albumin 07/21/2021 4.3  3.5 - 5.2 g/dL Final    Albumin/Globulin Ratio 07/21/2021 NOT REPORTED  1.0 - 2.5 Final    GFR Non- 07/21/2021 >60  >60 mL/min Final    GFR  07/21/2021 >60  >60 mL/min Final    GFR Comment 07/21/2021        Final    Comment: Average GFR for 30-36 years old:   80 mL/min/1.73sq m  Chronic Kidney Disease:   <60 mL/min/1.73sq m  Kidney failure:   <15 mL/min/1.73sq m              eGFR calculated using average adult body mass.  Additional eGFR calculator available at:        [email or web address]            GFR Staging 07/21/2021 NOT REPORTED   Final    Acetaminophen Level 07/21/2021 <5* 10 - 30 ug/mL Final    Ethanol 07/21/2021 <10  <10 mg/dL Final    Ethanol percent 07/21/2021 <0.525  % Final    Salicylate Lvl 42/68/4793 <1* 3 - 10 mg/dL Final    Ventricular Rate 07/21/2021 82  BPM Final    Atrial Rate 07/21/2021 82  BPM Final    P-R Interval 07/21/2021 148  ms Final    QRS Duration 07/21/2021 92  ms Final    Q-T Interval 07/21/2021 362  ms Final    QTc Calculation (Bazett) 07/21/2021 422 ms Final    P Axis 07/21/2021 46  degrees Final    R Axis 07/21/2021 51  degrees Final    T Axis 07/21/2021 46  degrees Final    Specimen Description 07/21/2021 . NASOPHARYNGEAL SWAB   Final    SARS-CoV-2, Rapid 07/21/2021 Not Detected  Not Detected Final    Comment:       Rapid NAAT:  The specimen is NEGATIVE for SARS-CoV-2, the novel coronavirus associated with   COVID-19. The ID NOW COVID-19 assay is designed to detect the virus that causes COVID-19 in patients   with signs and symptoms of infection who are suspected of COVID-19. An individual without symptoms of COVID-19 and who is not shedding SARS-CoV-2 virus would   expect to have a negative (not detected) result in this assay. Negative results should be treated as presumptive and, if inconsistent with clinical signs   and symptoms or necessary for patient management,  should be tested with an alternative molecular assay. Negative results do not preclude   SARS-CoV-2 infection and   should not be used as the sole basis for patient management decisions.          Fact sheet for Healthcare Providers: Darian  Fact sheet for Patients: Darian          Methodology: Isothermal Nucleic Acid Amplification      Amphetamine Screen, Ur 07/22/2021 NEGATIVE  NEGATIVE Final    Comment:       (Positive cutoff 1000 ng/mL)                  Barbiturate Screen, Ur 07/22/2021 NEGATIVE  NEGATIVE Final    Comment:       (Positive cutoff 200 ng/mL)                  Benzodiazepine Screen, Urine 07/22/2021 NEGATIVE  NEGATIVE Final    Comment:       (Positive cutoff 200 ng/mL)                  Cocaine Metabolite, Urine 07/22/2021 NEGATIVE  NEGATIVE Final    Comment:       (Positive cutoff 300 ng/mL)                  Methadone Screen, Urine 07/22/2021 NEGATIVE  NEGATIVE Final    Comment:       (Positive cutoff 300 ng/mL)                  Opiates, Urine 07/22/2021 NEGATIVE  NEGATIVE Final    Comment: (Positive cutoff 300 ng/mL)                  Phencyclidine, Urine 07/22/2021 NEGATIVE  NEGATIVE Final    Comment:       (Positive cutoff 25 ng/mL)                  Propoxyphene, Urine 07/22/2021 NOT REPORTED  NEGATIVE Final    Cannabinoid Scrn, Ur 07/22/2021 NEGATIVE  NEGATIVE Final    Comment:       (Positive cutoff 50 ng/mL)                  Oxycodone Screen, Ur 07/22/2021 NEGATIVE  NEGATIVE Final    Comment:       (Positive cutoff 100 ng/mL)                  Methamphetamine, Urine 07/22/2021 NOT REPORTED  NEGATIVE Final    Tricyclic Antidepressants, Urine 07/22/2021 NOT REPORTED  NEGATIVE Final    MDMA, Urine 07/22/2021 NOT REPORTED  NEGATIVE Final    Buprenorphine Urine 07/22/2021 NOT REPORTED  NEGATIVE Final    Test Information 07/22/2021 Assay provides medical screening only. The absence of expected drug(s) and/or metabolite(s) may indicate diluted or adulterated urine, limitations of testing or timing of collection. Final    Comment: Testing for legal purposes should be confirmed by another method. To request confirmation   of test result, please call the lab within 7 days of sample submission. Reviewed patient's current plan of care and vital signs with nursing staff.     Labs reviewed: [x] Yes  EKG reviewed,  on 7/21/2021    Medications  Current Facility-Administered Medications: traZODone (DESYREL) tablet 150 mg, 150 mg, Oral, Nightly  nicotine polacrilex (NICORETTE) gum 2 mg, 2 mg, Oral, PRN  divalproex (DEPAKOTE SPRINKLE) capsule 250 mg, 250 mg, Oral, 3 times per day  acetaminophen (TYLENOL) tablet 650 mg, 650 mg, Oral, Q4H PRN  polyethylene glycol (GLYCOLAX) packet 17 g, 17 g, Oral, Daily PRN  hydrOXYzine (ATARAX) tablet 50 mg, 50 mg, Oral, TID PRN  DULoxetine (CYMBALTA) extended release capsule 30 mg, 30 mg, Oral, Daily  fenofibrate (TRIGLIDE) tablet 160 mg, 160 mg, Oral, Daily  metFORMIN (GLUCOPHAGE) tablet 500 mg, 500 mg, Oral, BID WC  therapeutic multivitamin-minerals 1 tablet, 1 tablet, Oral, Daily  pantoprazole (PROTONIX) tablet 40 mg, 40 mg, Oral, QAM AC  cloNIDine (CATAPRES) tablet 0.1 mg, 0.1 mg, Oral, TID PRN  ondansetron (ZOFRAN) tablet 4 mg, 4 mg, Oral, Q8H PRN  buprenorphine-naloxone (SUBOXONE) 8-2 MG SL film 1 Film, 1 Film, Sublingual, Daily  gabapentin (NEURONTIN) capsule 600 mg, 600 mg, Oral, TID  ARIPiprazole (ABILIFY) tablet 15 mg, 15 mg, Oral, Daily    ASSESSMENT  Schizoaffective disorder, bipolar type ([facility] [address]         PLAN  Patient symptoms are: Improved  Continue current medication regimen. Monitor need and frequency of PRN medications. Encourage participation in groups and milieu. Attempt to develop insight. Psycho-education conducted. Supportive Therapy conducted. Probable discharge is per attending physician. We will likely discharge in the next 1 to 2 days to outpatient follow-up appointment. Follow-up daily while inpatient. Patient continues to be monitored in the inpatient psychiatric facility at [facility] for safety and stabilization. Patient continues to need, on a daily basis, active treatment furnished directly by or requiring the supervision of inpatient psychiatric personnel. **This report has been created using voice recognition software. It may contain minor errors which are inherent in voice recognition technology. **    I independently saw and evaluated the patient. I reviewed the nurse practitioners documentation above. Any additional comments or changes to the nurse practitioners documentation are stated below otherwise agree with assessment. Plan will be as follows:  Spent 30 minutes with the patient, of that greater than 16 minutes was spent in supportive psychotherapy. Patient very focused on disposition. Discussed his feelings of urgency to get out. Explored any possible desire to use. Patient denying side effects to current medication. Reports feeling stable for discharge.   Reviewed his disposition plan tomorrow. Called  during the course of our interview as well to verify key details. Patient reported modest anxiety. Requested medication to help him. Olanzapine IM was ordered but this was not due to extreme violence or agitation but per patient request.  We will move forward with discharge tomorrow if still doing well  PLAN  Patient s symptoms   are improving  Continue with current medication  Attempt to develop insight  Psycho-education conducted. Supportive Therapy conducted.   Probable discharge is tomorrow if stable or improved  Follow-up daily while on inpatient unit

## 2021-07-27 VITALS
TEMPERATURE: 97.6 F | BODY MASS INDEX: 33.62 KG/M2 | RESPIRATION RATE: 16 BRPM | HEIGHT: 69 IN | SYSTOLIC BLOOD PRESSURE: 117 MMHG | DIASTOLIC BLOOD PRESSURE: 71 MMHG | OXYGEN SATURATION: 96 % | WEIGHT: 227 LBS | HEART RATE: 71 BPM

## 2021-07-27 PROCEDURE — 6370000000 HC RX 637 (ALT 250 FOR IP): Performed by: NURSE PRACTITIONER

## 2021-07-27 PROCEDURE — 6370000000 HC RX 637 (ALT 250 FOR IP): Performed by: PSYCHIATRY & NEUROLOGY

## 2021-07-27 PROCEDURE — 99239 HOSP IP/OBS DSCHRG MGMT >30: CPT | Performed by: PSYCHIATRY & NEUROLOGY

## 2021-07-27 RX ADMIN — GABAPENTIN 600 MG: 300 CAPSULE ORAL at 08:20

## 2021-07-27 RX ADMIN — NICOTINE POLACRILEX 2 MG: 2 GUM, CHEWING BUCCAL at 06:02

## 2021-07-27 RX ADMIN — ARIPIPRAZOLE 15 MG: 15 TABLET ORAL at 08:20

## 2021-07-27 RX ADMIN — HYDROXYZINE HYDROCHLORIDE 50 MG: 50 TABLET, FILM COATED ORAL at 00:51

## 2021-07-27 RX ADMIN — ONDANSETRON HYDROCHLORIDE 4 MG: 4 TABLET, FILM COATED ORAL at 00:51

## 2021-07-27 RX ADMIN — Medication 1 TABLET: at 08:20

## 2021-07-27 RX ADMIN — FENOFIBRATE 160 MG: 160 TABLET ORAL at 08:22

## 2021-07-27 RX ADMIN — CLONIDINE HYDROCHLORIDE 0.1 MG: 0.1 TABLET ORAL at 03:20

## 2021-07-27 RX ADMIN — PANTOPRAZOLE SODIUM 40 MG: 40 TABLET, DELAYED RELEASE ORAL at 08:20

## 2021-07-27 RX ADMIN — METFORMIN HYDROCHLORIDE 500 MG: 500 TABLET ORAL at 08:20

## 2021-07-27 RX ADMIN — BUPRENORPHINE AND NALOXONE 1 FILM: 8; 2 FILM BUCCAL; SUBLINGUAL at 08:20

## 2021-07-27 RX ADMIN — DULOXETINE 30 MG: 30 CAPSULE, DELAYED RELEASE ORAL at 08:20

## 2021-07-27 NOTE — PROGRESS NOTES
[facility]  Discharge Note    Pt discharged with followings belongings:   Dentures: None  Vision - Corrective Lenses: None  Hearing Aid: None  Jewelry: None  Body Piercings Removed: N/A  Clothing: Footwear, Shirt, Pants, Undergarments (Comment), Socks (shorts, belt, wrap)  Were All Patient Medications Collected?: Yes  Other Valuables: Cell phone, Wallet (Credit cards, [place] ID, MEdical ID, phone , lighters)   Valuables sent home with pt or returned to patient. Patient education on aftercare instructions: yes  Information faxed to [facility] by Ramsey Moncada  at 9:08 AM .Patient verbalize understanding of AVS:  yes.     Status EXAM upon discharge:  Status and Exam  Normal: Yes  Facial Expression: Brightened  Affect: Appropriate  Level of Consciousness: Alert  Mood:Normal: Yes  Mood: Anxious, Irritable  Motor Activity:Normal: Yes  Motor Activity: Increased  Interview Behavior: Cooperative  Preception: [place] to Person, [place] to Time, [place] to Place, [place]ner to Situation  Attention:Normal: Yes  Attention: Distractible  Thought Processes: Circumstantial  Thought Content:Normal: Yes  Thought Content: Preoccupations  Hallucinations: None  Delusions: No  Memory:Normal: Yes  Memory: Poor Recent  Insight and Judgment: No  Insight and Judgment: Poor Insight  Present Suicidal Ideation: No  Present Homicidal Ideation: No      Metabolic Screening:    Lab Results   Component Value Date    LABA1C 5.7 10/23/2014       Lab Results   Component Value Date    CHOL 205 (H) 02/21/2017    CHOL 158 11/09/2015    CHOL 152 10/23/2014    CHOL 206 (H) 03/05/2014    CHOL 205 (H) 01/22/2014    CHOL 208 (H) 08/28/2013     Lab Results   Component Value Date    TRIG 189 (H) 02/21/2017    TRIG 223 (H) 11/09/2015    TRIG 52 10/23/2014    TRIG 104 03/05/2014    TRIG 74 01/22/2014    TRIG 104 08/28/2013     Lab Results   Component Value Date    HDL 44 02/21/2017    HDL 40 (L) 11/09/2015    HDL 55 10/23/2014    HDL 54 03/05/2014    HDL 53 01/22/2014    HDL 72 (H) 08/28/2013     No components found for: LDLCAL  No results found for: LABVLDL    Pt discharged to [facility] appt by cab. All belongings et valuables sent with pt. Pt verbalizes all discharge instructions. Filled medications sent with pt except suboxone which will be handled at [facility] appt.      Wanda Downing RN

## 2021-07-27 NOTE — PROGRESS NOTES
Patient given tobacco quitline number 78395619081 at this time, refusing to call at this time, states \" I just dont want to quit now\"- patient given information as to the dangers of long term tobacco use. Continue to reinforce the importance of tobacco cessation.

## 2021-07-27 NOTE — DISCHARGE SUMMARY
Provider Discharge Summary     Patient ID:  Martita Marte  [id number]  52 y.o.  1990    Admit date: 7/21/2021    Discharge date and time: 7/27/2021  12:28 PM     Admitting Physician: Julio César Rodriguez MD     Discharge Physician: Cayetano Ramos MD    Admission Diagnoses: Depression with suicidal ideation [F32.9, R45.851]    Discharge Diagnoses:      Schizoaffective disorder, bipolar type [facility])     Patient Active Problem List   Diagnosis Code    Opioid abuse ([facility] [address] F11.10    Noncompliance Z91.19    Rectal bleeding K62.5    Smoker F17.200    Juvenile rheumatoid arthritis ([facility] [address] M08.00    SRIRAM (obstructive sleep apnea) G47.33    Primary insomnia F51.01    Calculus of bile duct with acute on chronic cholecystitis K80.46    Mild intermittent asthma without complication F30.44    Gastritis K29.70    Generalized abdominal pain R10.84    Anemia D64.9    Elevated liver enzymes R74.8    Nausea and vomiting R11.2    Opioid type dependence, continuous (HCC) F11.20    Abdominal pain R10.9    Diarrhea R19.7    Irritable bowel syndrome with both constipation and diarrhea K58.2    Schizoaffective disorder, bipolar type (HCC) F25.0    GI bleed K92.2    Depression with suicidal ideation F32.9, R45.851        Admission Condition: poor    Discharged Condition: stable    Indication for Admission: threat to self    History of Present Illnes (present tense wording is of findings from admission exam and are not necessarily indicative of current findings): Martita Marte is a 27 y.o. male who has a past medical history of asthma, bipolar disorder, COPD, depression, GERD, PUD, sleep apnea, ulcerative colitis. Patient presented to the [facility] ED with suicidal ideation.   According to ED documentation: The patient states that he was thinking about his brother who was killed in 2014 and he took a half a bottle of Pepcid 20 mg around 20 pills around 8 PM in an attempt to kill himself the patient states that he started cut himself but it hurt and so he decided to take the Pepcid route.  Patient states that he has a history of allergy to Pepcid where he gets hives and his throat feels like he is it is closing he says that he does not have hives but his hands are red and he feels like his tongue is swelling.  States that he has abdominal pain as well without any nausea or vomiting.  He reports noncompliance with his psychiatric medications states that he never followed up with Rosanne Herrera is interviewed today bedside, he endorses anhedonia, low mood for greater than 2 weeks, poor sleep, decreased interest in enjoyable activities, feelings of worthlessness, poor energy, poor focus and concentration, decreased appetite, feelings of hopelessness and suicidal ideation. He reports a plan to overdose on pills. He reports a history of suicide attempt at age 13 where he tried to hang himself. At present he is denying any symptoms of lacey or psychosis. He is endorsing anxiety and feels that it is just as bad as his depression. He endorses panic attacks that cause shortness of breath, chest pain and sweating. He also endorses nightmares from PTSD. He reports that he ran out of his medications approximately 1 week ago. He reports that the last few days he has had increased nausea. He denies any alcohol, marijuana or illicit drug use. He reports that 1 week ago he did follow-up with [facility] to get his Suboxone and reports that he got a 1 week supply. He states that his last dose of Suboxone was today. This is contrary to his statement that he did not follow-up with [facility] and ran out of his psychotropic medication. He is aware that he will have to provide a urine specimen prior to receiving any type of Suboxone treatment. His last admission to [facility] was June 27 through June 30, 2021. Hospital Course:   Upon admission, Lila Rutherford was provided a safe secure environment, introduced to unit milieu.  Patient participated in Disp-60 tablet, R-0Normal      Multiple Vitamins-Minerals (THERAPEUTIC MULTIVITAMIN-MINERALS) tablet Take 1 tablet by mouth daily, Disp-30 tablet, R-0Normal      pantoprazole (PROTONIX) 40 MG tablet Take 1 tablet by mouth every morning (before breakfast), Disp-30 tablet, R-0Normal      traZODone (DESYREL) 150 MG tablet Take 1 tablet by mouth nightly, Disp-30 tablet, R-0Normal         STOP taking these medications       albuterol sulfate  (90 Base) MCG/ACT inhaler Comments:   Reason for Stopping:         meloxicam (MOBIC) 15 MG tablet Comments:   Reason for Stopping:         calcium carbonate (ANTACID) 500 MG chewable tablet Comments:   Reason for Stopping:         tiZANidine (ZANAFLEX) 4 MG tablet Comments:   Reason for Stopping:         QUEtiapine (SEROQUEL) 50 MG tablet Comments:   Reason for Stopping:         QUEtiapine (SEROQUEL) 200 MG tablet Comments:   Reason for Stopping:         ARIPiprazole Lauroxil (ARISTADA) 1064 MG/3.9ML PRSY injection Comments:   Reason for Stopping:         naloxone 4 MG/0.1ML LIQD nasal spray Comments:   Reason for Stopping:         meloxicam (MOBIC) 15 MG tablet Comments:   Reason for Stopping:         albuterol sulfate HFA (VENTOLIN HFA) 108 (90 Base) MCG/ACT inhaler Comments:   Reason for Stopping:                    Core Measures statement:   Not applicable    Discharge Exam:  Level of consciousness:  Within normal limits  Appearance: Street clothes, seated, with good grooming  Behavior/Motor: No abnormalities noted  Attitude toward examiner:  Cooperative, attentive, good eye contact  Speech:  spontaneous, normal rate, normal volume and well articulated  Mood:  euthymic  Affect:  Full range  Thought processes:  linear, goal directed and coherent  Thought content:  denies homicidal ideation  Suicidal Ideation:  denies suicidal ideation  Delusions:  no evidence of delusions  Perceptual Disturbance:  denies any perceptual disturbance  Cognition:  Intact  Memory: age appropriate  Insight & Judgement: fair  Medication side effects: denies     Disposition: home    Patient Instructions: Activity: activity as tolerated  1. Patient instructed to take medications regularly and follow up with outpatient appointments. Follow-up as scheduled with outpatient [facility] mental health and sober living program      Signed:    Electronically signed by Sundeep Lopez MD on 7/27/21 at 12:28 PM EDT    Time Spent on discharge is more than 30 minutes in the examination, evaluation, counseling and review of medications and discharge plan.

## 2021-07-27 NOTE — PLAN OF CARE
Problem: Suicide risk  Goal: Provide patient with safe environment  Description: Provide patient with safe environment  7/26/2021 2204 by Venus Purdy LPN  Outcome: Ongoing     Problem: Altered Mood, Depressive Behavior:  Goal: Able to verbalize and/or display a decrease in depressive symptoms  Description: Able to verbalize and/or display a decrease in depressive symptoms  7/26/2021 2204 by Venus Purdy LPN  Outcome: Ongoing     Problem: Altered Mood, Depressive Behavior:  Goal: Absence of self-harm  Description: Absence of self-harm  7/26/2021 2204 by Venus Purdy LPN  Outcome: Ongoing     Problem: Tobacco Use:  Goal: Inpatient tobacco use cessation counseling participation  Description: Inpatient tobacco use cessation counseling participation  7/26/2021 2204 by Venus Purdy LPN  Outcome: Ongoing     Problem: Pain:  Goal: Pain level will decrease  Description: Pain level will decrease  7/26/2021 2204 by Venus Purdy LPN  Outcome: Ongoing     Problem: Pain:  Goal: Control of acute pain  Description: Control of acute pain  7/26/2021 2204 by Venus Purdy LPN  Outcome: Ongoing     Problem: Pain:  Goal: Control of chronic pain  Description: Control of chronic pain  7/26/2021 2204 by Venus Purdy LPN  Outcome: Ongoing

## 2021-07-27 NOTE — PROGRESS NOTES
CLINICAL PHARMACY NOTE: MEDS TO BEDS    Total # of Prescriptions Filled:   5   The following medications were delivered to the patient:  · Trazodone, fenofibrate, depakote, abilify and gabapentin    Additional Documentation:

## 2021-08-13 DIAGNOSIS — G47.00 INSOMNIA, UNSPECIFIED TYPE: Primary | ICD-10-CM

## 2021-08-13 RX ORDER — ARIPIPRAZOLE 15 MG/1
15 TABLET ORAL DAILY
Qty: 30 TABLET | Refills: 3 | Status: ON HOLD | OUTPATIENT
Start: 2021-08-13 | End: 2021-11-01 | Stop reason: HOSPADM

## 2021-08-13 RX ORDER — DIVALPROEX SODIUM 125 MG/1
250 CAPSULE, COATED PELLETS ORAL EVERY 8 HOURS SCHEDULED
Qty: 180 CAPSULE | Refills: 0 | Status: SHIPPED | OUTPATIENT
Start: 2021-08-13 | End: 2021-08-19

## 2021-08-13 RX ORDER — SUVOREXANT 20 MG/1
20 TABLET, FILM COATED ORAL DAILY
Qty: 30 TABLET | Refills: 0 | Status: SHIPPED | OUTPATIENT
Start: 2021-08-13 | End: 2021-09-12

## 2021-08-13 RX ORDER — SUVOREXANT 20 MG/1
20 TABLET, FILM COATED ORAL DAILY
COMMUNITY
End: 2021-08-13 | Stop reason: SDUPTHER

## 2021-08-13 RX ORDER — CLONIDINE HYDROCHLORIDE 0.1 MG/1
0.1 TABLET ORAL 2 TIMES DAILY
Qty: 60 TABLET | Refills: 3 | Status: SHIPPED | OUTPATIENT
Start: 2021-08-13 | End: 2021-08-19

## 2021-08-13 RX ORDER — QUETIAPINE FUMARATE 100 MG/1
100 TABLET, FILM COATED ORAL NIGHTLY
Qty: 60 TABLET | Refills: 0 | Status: ON HOLD | OUTPATIENT
Start: 2021-08-13 | End: 2021-11-01 | Stop reason: SDUPTHER

## 2021-08-13 RX ORDER — QUETIAPINE FUMARATE 100 MG/1
100 TABLET, FILM COATED ORAL NIGHTLY
COMMUNITY
End: 2021-08-13 | Stop reason: SDUPTHER

## 2021-08-13 NOTE — TELEPHONE ENCOUNTER
Last visit- 5/27/2021  Next visit- 8/19/2021    Requested Prescriptions     Pending Prescriptions Disp Refills    QUEtiapine (SEROQUEL) 100 MG tablet 60 tablet 0     Sig: Take 1 tablet by mouth nightly    Suvorexant (BELSOMRA) 20 MG TABS 30 tablet 0     Sig: Take 1 tablet by mouth daily for 30 days.     ARIPiprazole (ABILIFY) 15 MG tablet 30 tablet 3     Sig: Take 1 tablet by mouth daily    cloNIDine (CATAPRES) 0.1 MG tablet 60 tablet 3     Sig: Take 1 tablet by mouth 2 times daily    divalproex (DEPAKOTE SPRINKLE) 125 MG capsule 180 capsule 0     Sig: Take 2 capsules by mouth every 8 hours

## 2021-08-16 ENCOUNTER — NURSE ONLY (OUTPATIENT)
Dept: LAB | Age: 31
End: 2021-08-16

## 2021-08-16 DIAGNOSIS — R51.9 CHRONIC INTRACTABLE HEADACHE, UNSPECIFIED HEADACHE TYPE: ICD-10-CM

## 2021-08-16 DIAGNOSIS — F11.20 SEVERE OPIOID USE DISORDER (HCC): ICD-10-CM

## 2021-08-16 DIAGNOSIS — G89.29 CHRONIC INTRACTABLE HEADACHE, UNSPECIFIED HEADACHE TYPE: ICD-10-CM

## 2021-08-16 LAB
ALBUMIN SERPL-MCNC: 4.5 G/DL (ref 3.5–5.1)
ALP BLD-CCNC: 66 U/L (ref 38–126)
ALT SERPL-CCNC: 12 U/L (ref 11–66)
ANION GAP SERPL CALCULATED.3IONS-SCNC: 11 MEQ/L (ref 8–16)
AST SERPL-CCNC: 19 U/L (ref 5–40)
BILIRUB SERPL-MCNC: 0.3 MG/DL (ref 0.3–1.2)
BUN BLDV-MCNC: 9 MG/DL (ref 7–22)
CALCIUM SERPL-MCNC: 9.3 MG/DL (ref 8.5–10.5)
CHLORIDE BLD-SCNC: 104 MEQ/L (ref 98–111)
CO2: 25 MEQ/L (ref 23–33)
CREAT SERPL-MCNC: 0.8 MG/DL (ref 0.4–1.2)
GFR SERPL CREATININE-BSD FRML MDRD: > 90 ML/MIN/1.73M2
GLUCOSE BLD-MCNC: 88 MG/DL (ref 70–108)
HEPATITIS C ANTIBODY: NEGATIVE
POTASSIUM SERPL-SCNC: 3.8 MEQ/L (ref 3.5–5.2)
SODIUM BLD-SCNC: 140 MEQ/L (ref 135–145)
TOTAL PROTEIN: 7.8 G/DL (ref 6.1–8)

## 2021-08-16 RX ORDER — PROMETHAZINE HYDROCHLORIDE 25 MG/1
25 TABLET ORAL 3 TIMES DAILY PRN
Refills: 0 | OUTPATIENT
Start: 2021-08-16 | End: 2021-08-23

## 2021-08-16 RX ORDER — TIZANIDINE 4 MG/1
4 TABLET ORAL EVERY 8 HOURS PRN
Refills: 0 | OUTPATIENT
Start: 2021-08-16

## 2021-08-16 RX ORDER — GABAPENTIN 600 MG/1
600 TABLET ORAL 3 TIMES DAILY
Qty: 90 TABLET | Refills: 0 | OUTPATIENT
Start: 2021-08-16 | End: 2021-09-15

## 2021-08-16 NOTE — TELEPHONE ENCOUNTER
Patient came in office today requesting refill on gabapentin, tizanidine, and phenergan. Patient informed Ceci Hale is not in office today, will not be back until Wednesday. I don't believe you have ever filled phenergan or tizanidine for patient.  He states he is on tizanidine 4mg and phenergan 25mg

## 2021-08-17 LAB — HIV AG/AB: NONREACTIVE

## 2021-08-18 NOTE — TELEPHONE ENCOUNTER
Spoke with pt today and let him know he will have to wait till his appointment tomorrow in order to get his medication filled,due to him not being see since 05/27/2021. Pt voiced he understood recommendations.

## 2021-08-19 ENCOUNTER — OFFICE VISIT (OUTPATIENT)
Dept: INTERNAL MEDICINE CLINIC | Age: 31
End: 2021-08-19
Payer: MEDICAID

## 2021-08-19 VITALS
TEMPERATURE: 99 F | WEIGHT: 215 LBS | SYSTOLIC BLOOD PRESSURE: 125 MMHG | HEIGHT: 69 IN | HEART RATE: 92 BPM | BODY MASS INDEX: 31.84 KG/M2 | DIASTOLIC BLOOD PRESSURE: 88 MMHG

## 2021-08-19 DIAGNOSIS — G43.911 INTRACTABLE MIGRAINE WITH STATUS MIGRAINOSUS, UNSPECIFIED MIGRAINE TYPE: ICD-10-CM

## 2021-08-19 DIAGNOSIS — R11.2 INTRACTABLE VOMITING WITH NAUSEA, UNSPECIFIED VOMITING TYPE: ICD-10-CM

## 2021-08-19 DIAGNOSIS — F11.20 SEVERE OPIOID USE DISORDER (HCC): Primary | ICD-10-CM

## 2021-08-19 DIAGNOSIS — Z87.39 HISTORY OF RHEUMATOID ARTHRITIS: ICD-10-CM

## 2021-08-19 PROCEDURE — 99214 OFFICE O/P EST MOD 30 MIN: CPT | Performed by: NURSE PRACTITIONER

## 2021-08-19 PROCEDURE — 80305 DRUG TEST PRSMV DIR OPT OBS: CPT | Performed by: NURSE PRACTITIONER

## 2021-08-19 RX ORDER — BUPRENORPHINE AND NALOXONE 8; 2 MG/1; MG/1
1 FILM, SOLUBLE BUCCAL; SUBLINGUAL 2 TIMES DAILY
Qty: 14 FILM | Refills: 0 | Status: SHIPPED | OUTPATIENT
Start: 2021-08-19 | End: 2021-08-26 | Stop reason: SDUPTHER

## 2021-08-19 RX ORDER — DOCUSATE SODIUM 100 MG/1
100 CAPSULE, LIQUID FILLED ORAL 2 TIMES DAILY
Qty: 60 CAPSULE | Refills: 0 | Status: SHIPPED | OUTPATIENT
Start: 2021-08-19 | End: 2021-09-18

## 2021-08-19 RX ORDER — HYDROXYZINE 50 MG/1
50 TABLET, FILM COATED ORAL 2 TIMES DAILY
Status: ON HOLD | COMMUNITY
End: 2021-11-22 | Stop reason: HOSPADM

## 2021-08-19 RX ORDER — PROMETHAZINE HYDROCHLORIDE 25 MG/1
25 TABLET ORAL 3 TIMES DAILY PRN
COMMUNITY
End: 2021-08-19 | Stop reason: SDUPTHER

## 2021-08-19 RX ORDER — DIPHENHYDRAMINE HCL 25 MG
25 TABLET ORAL 2 TIMES DAILY
Status: ON HOLD | COMMUNITY
End: 2021-11-01 | Stop reason: HOSPADM

## 2021-08-19 RX ORDER — PANTOPRAZOLE SODIUM 40 MG/1
40 TABLET, DELAYED RELEASE ORAL
Qty: 30 TABLET | Refills: 0 | Status: SHIPPED | OUTPATIENT
Start: 2021-08-19 | End: 2021-10-30 | Stop reason: SDUPTHER

## 2021-08-19 RX ORDER — PROMETHAZINE HYDROCHLORIDE 25 MG/1
25 TABLET ORAL 3 TIMES DAILY PRN
Qty: 15 TABLET | Refills: 0 | Status: SHIPPED | OUTPATIENT
Start: 2021-08-19 | End: 2021-08-26 | Stop reason: SDUPTHER

## 2021-08-19 RX ORDER — TIZANIDINE 4 MG/1
4 TABLET ORAL 3 TIMES DAILY PRN
COMMUNITY
End: 2021-08-19 | Stop reason: SDUPTHER

## 2021-08-19 RX ORDER — AMOXICILLIN 500 MG/1
500 CAPSULE ORAL 2 TIMES DAILY
Qty: 14 CAPSULE | Refills: 0 | Status: SHIPPED | OUTPATIENT
Start: 2021-08-19 | End: 2021-08-23 | Stop reason: SINTOL

## 2021-08-19 RX ORDER — TIZANIDINE 4 MG/1
4 TABLET ORAL 3 TIMES DAILY PRN
Qty: 30 TABLET | Refills: 0 | Status: SHIPPED | OUTPATIENT
Start: 2021-08-19 | End: 2021-09-07 | Stop reason: SDUPTHER

## 2021-08-19 RX ORDER — GABAPENTIN 600 MG/1
600 TABLET ORAL 3 TIMES DAILY
Qty: 90 TABLET | Refills: 0 | Status: SHIPPED | OUTPATIENT
Start: 2021-08-19 | End: 2021-08-26 | Stop reason: SDUPTHER

## 2021-08-19 RX ORDER — CLONIDINE HYDROCHLORIDE 0.1 MG/1
0.1 TABLET ORAL 3 TIMES DAILY
COMMUNITY
End: 2021-10-07 | Stop reason: SDUPTHER

## 2021-08-19 RX ORDER — GABAPENTIN 600 MG/1
600 TABLET ORAL 3 TIMES DAILY
COMMUNITY
End: 2021-08-19 | Stop reason: SDUPTHER

## 2021-08-19 RX ORDER — MELOXICAM 7.5 MG/1
7.5 TABLET ORAL 2 TIMES DAILY PRN
Qty: 60 TABLET | Refills: 0 | OUTPATIENT
Start: 2021-08-19 | End: 2021-10-30

## 2021-08-19 NOTE — PROGRESS NOTES
[address] INTERNAL MEDICINE AND MEDICATION MANAGEMENT  [address]  Dept: [phone]  Dept Fax: [phone]: [phone]     Visit Date:  8/19/2021    Patient:  Arpit Bray  YOB: 1990    HPI:     Chief Complaint   Patient presents with    Drug Problem     Rogers Pedraza presents today for medical evaluation of severe opioid use disorder and need for medication refills.      I last seen him 7 weeks ago.     States that after his last visit here, he went to [facility] and met with the Neurosurgeon whom he wanted to see in regards to his deep brain stimulator. He reports that the neurosurgeon does not want to remove it, due to the location, and would rather monitor it. At this point, I am unsure if there is a brain stimulator. I have tried to get records from [place], and they did not have any records.      He also reports being in a detox program in [place].      Previously followed with a psychiatrist at [facility]. [facility] psychiatry declined referral. Will reach out to see if we can arrange a psychiatry visit at [facility]. His mental health issues need to be evaluated and treated.     He is back in [facility], staying with a friend.      Last use yesterday. Used \"a couple of pills early in the morning\".     Urine positive for buprenorphine, fentanyl, and methamphetamines.      Feels horrible- COWs 9      Discussed at length all MAT options including buprenorphine, naltrexone, and methadone. Wishes to pursue treatment with buprenorphine at this time. Discussed potential for overdose and/or precipitated withdrawal. Understanding voiced. EGD in [place]- Fri - stomach ulcers    At [employer]- ?  Was at [facility] house- has not been using often, few slip ups- has been on suboxone     [facility] for psychiatry- ?????     Nausea- vomiting- diarrhea- constipation-     Joint inflammation as well- takes mobic BID PRN- RA- [facility] - [facility] POSITIVE -     RIGHT TOP DENTAL ABSCESS     Deep brain stimulator- central nervous system- migraines- declines mri - due to radiation-     SCIATICA- RIGHT SIDE- LEG     URINE POSITIVE FOR METHAMPHETAMINES- DECLINES ANY USE- ADMITS TO USING A PRESSED FENTANYL PILL-     Medications    Current Outpatient Medications:     diphenhydrAMINE (BENADRYL) 25 MG tablet, Take 25 mg by mouth 2 times daily , Disp: , Rfl:     cloNIDine (CATAPRES) 0.1 MG tablet, Take 0.1 mg by mouth 3 times daily, Disp: , Rfl:     hydrOXYzine (ATARAX) 50 MG tablet, Take 50 mg by mouth 2 times daily, Disp: , Rfl:     meloxicam (MOBIC) 7.5 MG tablet, Take 1 tablet by mouth 2 times daily as needed for Pain, Disp: 60 tablet, Rfl: 0    tiZANidine (ZANAFLEX) 4 MG tablet, Take 1 tablet by mouth 3 times daily as needed (SCIATICA), Disp: 30 tablet, Rfl: 0    pantoprazole (PROTONIX) 40 MG tablet, Take 1 tablet by mouth every morning (before breakfast), Disp: 30 tablet, Rfl: 0    docusate sodium (COLACE) 100 MG capsule, Take 1 capsule by mouth 2 times daily, Disp: 60 capsule, Rfl: 0    QUEtiapine (SEROQUEL) 100 MG tablet, Take 1 tablet by mouth nightly, Disp: 60 tablet, Rfl: 0    Suvorexant (BELSOMRA) 20 MG TABS, Take 1 tablet by mouth daily for 30 days. , Disp: 30 tablet, Rfl: 0    ARIPiprazole (ABILIFY) 15 MG tablet, Take 1 tablet by mouth daily, Disp: 30 tablet, Rfl: 3    metFORMIN (GLUCOPHAGE) 500 MG tablet, Take 1 tablet by mouth 2 times daily (with meals), Disp: 60 tablet, Rfl: 0    Multiple Vitamins-Minerals (THERAPEUTIC MULTIVITAMIN-MINERALS) tablet, Take 1 tablet by mouth daily, Disp: 30 tablet, Rfl: 0    traZODone (DESYREL) 150 MG tablet, Take 1 tablet by mouth nightly, Disp: 30 tablet, Rfl: 0    gabapentin (NEURONTIN) 800 MG tablet, Take 1 tablet by mouth 3 times daily for 30 days. , Disp: 90 tablet, Rfl: 0    promethazine (PHENERGAN) 25 MG tablet, Take 1 tablet by mouth 3 times daily as needed for Nausea, Disp: 30 tablet, Rfl: 0   ondansetron (ZOFRAN ODT) 4 MG disintegrating tablet, Take 1 tablet by mouth every 8 hours as needed for Nausea, Disp: 10 tablet, Rfl: 0    sucralfate (CARAFATE) 1 GM tablet, Take 1 tablet by mouth 4 times daily, Disp: 60 tablet, Rfl: 0    The patient is allergic to dicyclomine, famotidine, geodon [ziprasidone hcl], haloperidol, iv dye [iodides], ibuprofen, aspirin, dicyclomine hcl, flagyl [metronidazole], iodine, and mirtazapine. Past Medical History  Sean Shell  has a past medical history of Anxiety, Arthritis, Asthma, Bipolar I disorder, most recent episode (or current) depressed, unspecified, Clostridium difficile infection, COPD (chronic obstructive pulmonary disease) ([address] Depression, Disease of blood and blood forming organ, Eczema, Fracture, metacarpal, Gastric ulcer, Gastritis, GERD (gastroesophageal reflux disease), GI bleed, H. pylori infection, H/O blood clots, Head injury, Headache, Insomnia, Juvenile rheumatoid arthritis ([address] Neuromuscular disorder ([address] PFAPA syndrome ([address] PUD (peptic ulcer disease), Rheumatoid arthritis ([address] Rheumatoid arthritis(714.0), Sleep apnea, 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[address] Substance abuse ([address] Suicidal ideation, Suicide attempt by hanging ([address] Tobacco dependence, Ulcerative colitis ([address] and UTI (urinary tract infection). Past Surgical History  The patient  has a past surgical history that includes Colonoscopy; bronchoscopy; other surgical history; Upper gastrointestinal endoscopy (2/4/16); pr egd transoral biopsy single/multiple (N/A, 3/20/2017); sigmoidoscopy (N/A, 3/20/2017); Cholecystectomy, laparoscopic (07/14/2017); pr esophagogastroduodenoscopy transoral diagnostic (N/A, 8/9/2017); pr colonoscopy w/biopsy single/multiple (8/9/2017); Abdomen surgery; Upper gastrointestinal endoscopy (N/A, 6/13/2018); Upper gastrointestinal endoscopy (N/A, 9/20/2018); and Endoscopy, colon, diagnostic.     Family History  This patient's family history includes Alcohol Abuse in his father; Arthritis in his father and mother; Colon Cancer (age of onset: 43) in his paternal cousin; Depression in his brother and father; Diabetes in his brother and father; High Blood Pressure in his father; Mental Illness in his brother; Migraines in his brother, father, mother, and sister; Other in his brother, brother, father, mother, and sister; Stroke in an other family member. Social History  Marco Antonio Franco  reports that he has been smoking cigarettes. He has a 7.50 pack-year smoking history. He has never used smokeless tobacco. He reports previous alcohol use. He reports previous drug use. Drug: Opiates . Health Maintenance:    Health Maintenance   Topic Date Due    Varicella vaccine (1 of 2 - 2-dose childhood series) Never done    COVID-19 Vaccine (1) Never done    DTaP/Tdap/Td vaccine (1 - Tdap) Never done    A1C test (Diabetic or Prediabetic)  10/23/2015    Flu vaccine (1) 09/01/2021    Pneumococcal 0-64 years Vaccine (2 of 2 - PPSV23) 10/20/2055    Hepatitis C screen  Completed    HIV screen  Completed    Hepatitis A vaccine  Aged Out    Hepatitis B vaccine  Aged Out    Hib vaccine  Aged Out    Meningococcal (ACWY) vaccine  Aged Out       Subjective:      Review of Systems   Cardiovascular: Positive for palpitations. Neurological: Positive for dizziness and headaches. Psychiatric/Behavioral: Positive for dysphoric mood. Negative for suicidal ideas. All other systems reviewed and are negative. Objective:     /88   Pulse 92   Temp 99 °F (37.2 °C) (Temporal)   Ht 5' 9\" (1.753 m)   Wt 215 lb (97.5 kg)   BMI 31.75 kg/m²     Physical Exam  Vitals reviewed. Constitutional:       General: He is not in acute distress. Appearance: Normal appearance. He is not ill-appearing. HENT:      Head: Normocephalic and atraumatic. Right Ear: External ear normal.      Left Ear: External ear normal.      Nose: Nose normal. No congestion or rhinorrhea.       Mouth/Throat:      Mouth: patient instructions. Discussed use, benefit, and side effects of prescribed medications. All patient questions answered. Pt voiced understanding. Reviewed health maintenance.        Electronically signed MASSIEL Rodriguez CNP on 8/19/21 at 11:42 AM EDT

## 2021-08-20 ENCOUNTER — HOSPITAL ENCOUNTER (OUTPATIENT)
Dept: MRI IMAGING | Age: 31
Discharge: HOME | End: 2021-08-20
Payer: MEDICARE

## 2021-08-20 DIAGNOSIS — G43.911 INTRACTABLE MIGRAINE WITH STATUS MIGRAINOSUS, UNSPECIFIED MIGRAINE TYPE: ICD-10-CM

## 2021-08-23 ENCOUNTER — APPOINTMENT (OUTPATIENT)
Dept: GENERAL RADIOLOGY | Age: 31
End: 2021-08-23
Payer: MEDICAID

## 2021-08-23 ENCOUNTER — HOSPITAL ENCOUNTER (EMERGENCY)
Age: 31
Discharge: HOME | End: 2021-08-23
Attending: EMERGENCY MEDICINE
Payer: MEDICAID

## 2021-08-23 VITALS
BODY MASS INDEX: 31.84 KG/M2 | TEMPERATURE: 98.4 F | HEIGHT: 69 IN | RESPIRATION RATE: 16 BRPM | DIASTOLIC BLOOD PRESSURE: 74 MMHG | HEART RATE: 97 BPM | WEIGHT: 215 LBS | SYSTOLIC BLOOD PRESSURE: 114 MMHG | OXYGEN SATURATION: 94 %

## 2021-08-23 DIAGNOSIS — R11.2 NAUSEA VOMITING AND DIARRHEA: ICD-10-CM

## 2021-08-23 DIAGNOSIS — R10.13 ABDOMINAL PAIN, EPIGASTRIC: ICD-10-CM

## 2021-08-23 DIAGNOSIS — R10.13 DYSPEPSIA: Primary | ICD-10-CM

## 2021-08-23 DIAGNOSIS — R19.7 NAUSEA VOMITING AND DIARRHEA: ICD-10-CM

## 2021-08-23 LAB
ALBUMIN SERPL-MCNC: 4.5 G/DL (ref 3.5–5.1)
ALP BLD-CCNC: 66 U/L (ref 38–126)
ALT SERPL-CCNC: 9 U/L (ref 11–66)
ANION GAP SERPL CALCULATED.3IONS-SCNC: 11 MEQ/L (ref 8–16)
AST SERPL-CCNC: 19 U/L (ref 5–40)
BASOPHILS # BLD: 0.4 %
BASOPHILS ABSOLUTE: 0 THOU/MM3 (ref 0–0.1)
BILIRUB SERPL-MCNC: 0.3 MG/DL (ref 0.3–1.2)
BILIRUBIN DIRECT: < 0.2 MG/DL (ref 0–0.3)
BUN BLDV-MCNC: 10 MG/DL (ref 7–22)
CALCIUM SERPL-MCNC: 9.3 MG/DL (ref 8.5–10.5)
CHLORIDE BLD-SCNC: 106 MEQ/L (ref 98–111)
CO2: 23 MEQ/L (ref 23–33)
CREAT SERPL-MCNC: 0.9 MG/DL (ref 0.4–1.2)
EKG ATRIAL RATE: 89 BPM
EKG P AXIS: 53 DEGREES
EKG P-R INTERVAL: 136 MS
EKG Q-T INTERVAL: 330 MS
EKG QRS DURATION: 86 MS
EKG QTC CALCULATION (BAZETT): 401 MS
EKG R AXIS: 55 DEGREES
EKG T AXIS: 36 DEGREES
EKG VENTRICULAR RATE: 89 BPM
EOSINOPHIL # BLD: 0.9 %
EOSINOPHILS ABSOLUTE: 0.1 THOU/MM3 (ref 0–0.4)
ERYTHROCYTE [DISTWIDTH] IN BLOOD BY AUTOMATED COUNT: 13.2 % (ref 11.5–14.5)
ERYTHROCYTE [DISTWIDTH] IN BLOOD BY AUTOMATED COUNT: 43.1 FL (ref 35–45)
GFR SERPL CREATININE-BSD FRML MDRD: > 90 ML/MIN/1.73M2
GLUCOSE BLD-MCNC: 124 MG/DL (ref 70–108)
HCT VFR BLD CALC: 44.5 % (ref 42–52)
HEMOGLOBIN: 15.2 GM/DL (ref 14–18)
IMMATURE GRANS (ABS): 0.02 THOU/MM3 (ref 0–0.07)
IMMATURE GRANULOCYTES: 0.3 %
LIPASE: 23 U/L (ref 5.6–51.3)
LYMPHOCYTES # BLD: 29.8 %
LYMPHOCYTES ABSOLUTE: 2.3 THOU/MM3 (ref 1–4.8)
MCH RBC QN AUTO: 30.4 PG (ref 26–33)
MCHC RBC AUTO-ENTMCNC: 34.2 GM/DL (ref 32.2–35.5)
MCV RBC AUTO: 89 FL (ref 80–94)
MONOCYTES # BLD: 6 %
MONOCYTES ABSOLUTE: 0.5 THOU/MM3 (ref 0.4–1.3)
NUCLEATED RED BLOOD CELLS: 0 /100 WBC
OSMOLALITY CALCULATION: 279.9 MOSMOL/KG (ref 275–300)
PLATELET # BLD: 298 THOU/MM3 (ref 130–400)
PMV BLD AUTO: 9.7 FL (ref 9.4–12.4)
POTASSIUM REFLEX MAGNESIUM: 3.6 MEQ/L (ref 3.5–5.2)
RBC # BLD: 5 MILL/MM3 (ref 4.7–6.1)
SEG NEUTROPHILS: 62.6 %
SEGMENTED NEUTROPHILS ABSOLUTE COUNT: 4.9 THOU/MM3 (ref 1.8–7.7)
SODIUM BLD-SCNC: 140 MEQ/L (ref 135–145)
TOTAL PROTEIN: 7.4 G/DL (ref 6.1–8)
WBC # BLD: 7.8 THOU/MM3 (ref 4.8–10.8)

## 2021-08-23 PROCEDURE — 36415 COLL VENOUS BLD VENIPUNCTURE: CPT

## 2021-08-23 PROCEDURE — 80048 BASIC METABOLIC PNL TOTAL CA: CPT

## 2021-08-23 PROCEDURE — 93010 ELECTROCARDIOGRAM REPORT: CPT | Performed by: INTERNAL MEDICINE

## 2021-08-23 PROCEDURE — 93005 ELECTROCARDIOGRAM TRACING: CPT | Performed by: EMERGENCY MEDICINE

## 2021-08-23 PROCEDURE — 6370000000 HC RX 637 (ALT 250 FOR IP): Performed by: EMERGENCY MEDICINE

## 2021-08-23 PROCEDURE — 96374 THER/PROPH/DIAG INJ IV PUSH: CPT

## 2021-08-23 PROCEDURE — 74022 RADEX COMPL AQT ABD SERIES: CPT

## 2021-08-23 PROCEDURE — 6360000002 HC RX W HCPCS

## 2021-08-23 PROCEDURE — 99284 EMERGENCY DEPT VISIT MOD MDM: CPT

## 2021-08-23 PROCEDURE — 96375 TX/PRO/DX INJ NEW DRUG ADDON: CPT

## 2021-08-23 PROCEDURE — 83690 ASSAY OF LIPASE: CPT

## 2021-08-23 PROCEDURE — 80076 HEPATIC FUNCTION PANEL: CPT

## 2021-08-23 PROCEDURE — 85025 COMPLETE CBC W/AUTO DIFF WBC: CPT

## 2021-08-23 RX ORDER — DIPHENHYDRAMINE HYDROCHLORIDE 50 MG/ML
25 INJECTION INTRAMUSCULAR; INTRAVENOUS ONCE
Status: COMPLETED | OUTPATIENT
Start: 2021-08-23 | End: 2021-08-23

## 2021-08-23 RX ORDER — ONDANSETRON 4 MG/1
4 TABLET, ORALLY DISINTEGRATING ORAL EVERY 8 HOURS PRN
Qty: 10 TABLET | Refills: 0 | Status: ON HOLD | OUTPATIENT
Start: 2021-08-23 | End: 2021-10-30

## 2021-08-23 RX ORDER — ONDANSETRON 2 MG/ML
INJECTION INTRAMUSCULAR; INTRAVENOUS
Status: COMPLETED
Start: 2021-08-23 | End: 2021-08-23

## 2021-08-23 RX ORDER — DIPHENHYDRAMINE HYDROCHLORIDE 50 MG/ML
INJECTION INTRAMUSCULAR; INTRAVENOUS
Status: COMPLETED
Start: 2021-08-23 | End: 2021-08-23

## 2021-08-23 RX ORDER — DIPHENHYDRAMINE HCL 25 MG
25 TABLET ORAL ONCE
Status: COMPLETED | OUTPATIENT
Start: 2021-08-23 | End: 2021-08-23

## 2021-08-23 RX ORDER — PANTOPRAZOLE SODIUM 20 MG/1
40 TABLET, DELAYED RELEASE ORAL DAILY
Qty: 30 TABLET | Refills: 0 | Status: SHIPPED | OUTPATIENT
Start: 2021-08-23 | End: 2021-08-26

## 2021-08-23 RX ORDER — ONDANSETRON 2 MG/ML
4 INJECTION INTRAMUSCULAR; INTRAVENOUS ONCE
Status: COMPLETED | OUTPATIENT
Start: 2021-08-23 | End: 2021-08-23

## 2021-08-23 RX ORDER — SUCRALFATE 1 G/1
1 TABLET ORAL 4 TIMES DAILY
Qty: 60 TABLET | Refills: 0 | Status: ON HOLD | OUTPATIENT
Start: 2021-08-23 | End: 2021-11-22 | Stop reason: HOSPADM

## 2021-08-23 RX ADMIN — DIPHENHYDRAMINE HYDROCHLORIDE 25 MG: 50 INJECTION, SOLUTION INTRAMUSCULAR; INTRAVENOUS at 14:44

## 2021-08-23 RX ADMIN — ONDANSETRON 4 MG: 2 INJECTION INTRAMUSCULAR; INTRAVENOUS at 14:43

## 2021-08-23 RX ADMIN — DIPHENHYDRAMINE HYDROCHLORIDE 25 MG: 50 INJECTION INTRAMUSCULAR; INTRAVENOUS at 14:44

## 2021-08-23 RX ADMIN — LIDOCAINE HYDROCHLORIDE: 20 SOLUTION ORAL; TOPICAL at 14:43

## 2021-08-23 RX ADMIN — DIPHENHYDRAMINE HCL 25 MG: 25 TABLET ORAL at 15:23

## 2021-08-23 ASSESSMENT — PAIN DESCRIPTION - DESCRIPTORS: DESCRIPTORS: SHARP

## 2021-08-23 ASSESSMENT — PAIN DESCRIPTION - PAIN TYPE: TYPE: ACUTE PAIN

## 2021-08-23 ASSESSMENT — PAIN SCALES - GENERAL: PAINLEVEL_OUTOF10: 9

## 2021-08-23 ASSESSMENT — PAIN DESCRIPTION - LOCATION: LOCATION: ABDOMEN;CHEST

## 2021-08-23 NOTE — ED TRIAGE NOTES
PT in bed. PT stated that he was taking penicillin for a tooth abscess. PT stated that his skin has been itchy and his hearty has been racing. PT stated that he has had abdominal pain and vomiting since Friday. PT in no distress.

## 2021-08-24 ENCOUNTER — CARE COORDINATION (OUTPATIENT)
Dept: CARE COORDINATION | Age: 31
End: 2021-08-24

## 2021-08-24 ASSESSMENT — ENCOUNTER SYMPTOMS
COLOR CHANGE: 0
SINUS PRESSURE: 0
SORE THROAT: 0
ABDOMINAL PAIN: 1
VOMITING: 1
NAUSEA: 1
BACK PAIN: 0
DIARRHEA: 1
COUGH: 0
PHOTOPHOBIA: 0
EYE REDNESS: 0
CHEST TIGHTNESS: 0

## 2021-08-24 NOTE — CARE COORDINATION
Ambulatory Care Coordination Note  8/24/2021  CM Risk Score: 5  Charlson 10 Year Mortality Risk Score: 10%     ACC: Jacinda Gold RN    Summary Note: Zane Mcduffie was referred to care coordination by [facility] report following an ED visit for abd pain, n/v/d, dizziness. Pt was d/c home on protonix, carafate, zofran. Spoke with pt today. Introduced self and role. Agreeable to f/u calls. Pt states s/s are not any better. No longer having any diarrhea but has continued vomiting. Reports that he forgot to report to ED doctor yesterday that he has vomited a little bit of blood. Last emesis was this AM and pt reports that it was green with small amt of blood noted. Has not ate or drank anything red in color. Pain is at umbilicus. Describes pain as sharp 8/10. Unable to tolerate zofran but also has phenergan that he has been taking. Reports s/s have been present since Friday. Also reports that he continues to be dizzy upon standing and has been diaphoretic today. Having a difficult time keeping food and fluids down despite taking anti emetics. Continues to feel heart is racing at times. EKG in ED showed NSR yesterday. D/t s/s I advised pt to be re evaluated in ED. Informed him that I would also update PCP re: additional recommendations. Pt has h/o opioid abuse, fibromyalgia, juvenile RA, depression, PTSD  Last PCP visit referrals were made to Rheumatology and to GI. Pt reports that he does not have appts scheduled for either one at this time. Pt follows with medication mgmt for suboxone tx. Follows with [facility] for behavioral health  Plan of care:  Weekly f/u calls  Seek medical evaluation for worsening GI s/s. New referrals placed last wk for rheumatology, GI. Will f/u to make sure appts get scheduled  Continue close f/u with PCP  Continue taking suboxone as prescribed  Continue close f/u with [facility] for mgmt of behavioral health concerns. Drink small amts of fluid frequently.   Take phenergan as needed since unable to tolerate zofran. Educated patient about risk for severe COVID-19 due to risk factors according to CDC guidelines. ACM reviewed discharge instructions, medical action plan and red flag symptoms with the patient who verbalized understanding. Discussed COVID vaccination status: No. Education provided on COVID-19 vaccination as appropriate. Discussed exposure protocols and quarantine with CDC Guidelines. Patient was given an opportunity to verbalize any questions and concerns and agrees to contact ACM or health care provider for questions related to their healthcare. General Assessment    Do you have any symptoms that are causing concern?: Yes  Progression since Onset: Gradually Worsening  Reported Symptoms: Abdominal Pain, Diaphoresis, Nausea, Vomiting, Other (Comment: feeling of heart racing. )         Message routed to PCP re: pt's s/s. Ambulatory Care Coordination Assessment    Care Coordination Protocol  Week 1 - Initial Assessment     Do you have all of your prescriptions and are they filled?: No  Barriers to medication adherence: Forgets to take  How often do you have trouble taking your medications the way you have been told to take them?: Sometimes I take them as prescribed. Do you have Home O2 Therapy?: No                        Suggested Interventions and Community Resources                  Prior to Admission medications    Medication Sig Start Date End Date Taking?  Authorizing Provider   sucralfate (CARAFATE) 1 GM tablet Take 1 tablet by mouth 4 times daily 8/23/21  Yes Anne Lee,    cloNIDine (CATAPRES) 0.1 MG tablet Take 0.1 mg by mouth 3 times daily   Yes Historical Provider, MD   hydrOXYzine (ATARAX) 50 MG tablet Take 50 mg by mouth 2 times daily   Yes Historical Provider, MD   meloxicam (MOBIC) 7.5 MG tablet Take 1 tablet by mouth 2 times daily as needed for Pain 8/19/21 9/18/21 Yes Tracy Holley, APRN - CNP   tiZANidine (ZANAFLEX) 4 MG tablet Take 1 tablet by mouth 3 times daily as needed (SCIATICA) 8/19/21  Yes MASSIEL Mahmood CNP   pantoprazole (PROTONIX) 40 MG tablet Take 1 tablet by mouth every morning (before breakfast) 8/19/21  Yes MASSIEL Mahmood CNP   gabapentin (NEURONTIN) 600 MG tablet Take 1 tablet by mouth 3 times daily for 30 days. 8/19/21 9/18/21 Yes MASSIEL Mahmood CNP   promethazine (PHENERGAN) 25 MG tablet Take 1 tablet by mouth 3 times daily as needed for Nausea 8/19/21  Yes MASSIEL Mahmood CNP   buprenorphine-naloxone (SUBOXONE) 8-2 MG FILM SL film Place 1 Film under the tongue 2 times daily for 7 days. 8/19/21 8/26/21 Yes MASSIEL Mahmood CNP   QUEtiapine (SEROQUEL) 100 MG tablet Take 1 tablet by mouth nightly 8/13/21  Yes MASSIEL Mahmood CNP   Suvorexant (BELSOMRA) 20 MG TABS Take 1 tablet by mouth daily for 30 days.  8/13/21 9/12/21 Yes MASSIEL Mahmood CNP   ARIPiprazole (ABILIFY) 15 MG tablet Take 1 tablet by mouth daily 8/13/21  Yes MASSIEL Mahmood CNP   metFORMIN (GLUCOPHAGE) 500 MG tablet Take 1 tablet by mouth 2 times daily (with meals) 7/26/21  Yes Monalisa Maxwell MD   traZODone (DESYREL) 150 MG tablet Take 1 tablet by mouth nightly 7/26/21  Yes Monalisa Maxwell MD   ondansetron (ZOFRAN ODT) 4 MG disintegrating tablet Take 1 tablet by mouth every 8 hours as needed for Nausea  Patient not taking: Reported on 8/24/2021 8/23/21   Pat Lopez DO   pantoprazole (PROTONIX) 20 MG tablet Take 2 tablets by mouth daily 8/23/21   Pat Lopez DO   diphenhydrAMINE (BENADRYL) 25 MG tablet Take 25 mg by mouth 2 times daily  Patient not taking: Reported on 8/24/2021    Historical Provider, MD   docusate sodium (COLACE) 100 MG capsule Take 1 capsule by mouth 2 times daily 8/19/21 9/18/21  Rosalva Teague, APRN - CNP   Multiple Vitamins-Minerals (THERAPEUTIC MULTIVITAMIN-MINERALS) tablet Take 1 tablet by mouth daily 7/26/21   Monalisa Maxwell MD       Future Appointments Date Time Provider [facility]   8/26/2021 11:00 AM MASSIEL Hall - CNP SRPX  [facility] - [facility]

## 2021-08-25 DIAGNOSIS — M08.00 JUVENILE RHEUMATOID ARTHRITIS (HCC): Primary | ICD-10-CM

## 2021-08-26 ENCOUNTER — APPOINTMENT (OUTPATIENT)
Dept: CT IMAGING | Age: 31
End: 2021-08-26
Payer: MEDICAID

## 2021-08-26 ENCOUNTER — HOSPITAL ENCOUNTER (EMERGENCY)
Age: 31
Discharge: HOME | End: 2021-08-26
Attending: EMERGENCY MEDICINE
Payer: MEDICAID

## 2021-08-26 ENCOUNTER — OFFICE VISIT (OUTPATIENT)
Dept: INTERNAL MEDICINE CLINIC | Age: 31
End: 2021-08-26
Payer: MEDICAID

## 2021-08-26 VITALS
SYSTOLIC BLOOD PRESSURE: 144 MMHG | BODY MASS INDEX: 33.77 KG/M2 | HEART RATE: 90 BPM | TEMPERATURE: 97.2 F | HEIGHT: 69 IN | WEIGHT: 228 LBS | DIASTOLIC BLOOD PRESSURE: 93 MMHG

## 2021-08-26 VITALS
DIASTOLIC BLOOD PRESSURE: 76 MMHG | TEMPERATURE: 98.5 F | BODY MASS INDEX: 36.92 KG/M2 | HEART RATE: 73 BPM | SYSTOLIC BLOOD PRESSURE: 128 MMHG | OXYGEN SATURATION: 95 % | RESPIRATION RATE: 16 BRPM | WEIGHT: 250 LBS

## 2021-08-26 DIAGNOSIS — F11.20 SEVERE OPIOID USE DISORDER (HCC): Primary | ICD-10-CM

## 2021-08-26 DIAGNOSIS — Z76.5 MALINGERING: ICD-10-CM

## 2021-08-26 DIAGNOSIS — Z96.89 S/P DEEP BRAIN STIMULATOR PLACEMENT: ICD-10-CM

## 2021-08-26 DIAGNOSIS — R11.0 NAUSEA: ICD-10-CM

## 2021-08-26 DIAGNOSIS — M79.7 FIBROMYALGIA: ICD-10-CM

## 2021-08-26 DIAGNOSIS — R10.84 GENERALIZED ABDOMINAL PAIN: Primary | ICD-10-CM

## 2021-08-26 DIAGNOSIS — R11.2 NAUSEA AND VOMITING, INTRACTABILITY OF VOMITING NOT SPECIFIED, UNSPECIFIED VOMITING TYPE: ICD-10-CM

## 2021-08-26 LAB
ALBUMIN SERPL-MCNC: 4.7 G/DL (ref 3.5–5.1)
ALCOHOL URINE: ABNORMAL
ALP BLD-CCNC: 68 U/L (ref 38–126)
ALT SERPL-CCNC: 9 U/L (ref 11–66)
AMPHETAMINE SCREEN, URINE: ABNORMAL
ANION GAP SERPL CALCULATED.3IONS-SCNC: 11 MEQ/L (ref 8–16)
AST SERPL-CCNC: 24 U/L (ref 5–40)
BARBITURATE SCREEN, URINE: ABNORMAL
BASOPHILS # BLD: 0.4 %
BASOPHILS ABSOLUTE: 0 THOU/MM3 (ref 0–0.1)
BENZODIAZEPINE SCREEN, URINE: ABNORMAL
BILIRUB SERPL-MCNC: 0.2 MG/DL (ref 0.3–1.2)
BILIRUBIN DIRECT: < 0.2 MG/DL (ref 0–0.3)
BUN BLDV-MCNC: 11 MG/DL (ref 7–22)
BUPRENORPHINE URINE: ABNORMAL
CALCIUM SERPL-MCNC: 9 MG/DL (ref 8.5–10.5)
CHLORIDE BLD-SCNC: 101 MEQ/L (ref 98–111)
CO2: 27 MEQ/L (ref 23–33)
COCAINE METABOLITE SCREEN URINE: ABNORMAL
CREAT SERPL-MCNC: 0.8 MG/DL (ref 0.4–1.2)
EOSINOPHIL # BLD: 1.1 %
EOSINOPHILS ABSOLUTE: 0.1 THOU/MM3 (ref 0–0.4)
ERYTHROCYTE [DISTWIDTH] IN BLOOD BY AUTOMATED COUNT: 12.9 % (ref 11.5–14.5)
ERYTHROCYTE [DISTWIDTH] IN BLOOD BY AUTOMATED COUNT: 42.5 FL (ref 35–45)
FENTANYL SCREEN, URINE: ABNORMAL
GABAPENTIN SCREEN, URINE: ABNORMAL
GFR SERPL CREATININE-BSD FRML MDRD: > 90 ML/MIN/1.73M2
GLUCOSE BLD-MCNC: 88 MG/DL (ref 70–108)
HCT VFR BLD CALC: 45.2 % (ref 42–52)
HEMOCCULT STL QL: NEGATIVE
HEMOGLOBIN: 15.3 GM/DL (ref 14–18)
IMMATURE GRANS (ABS): 0.02 THOU/MM3 (ref 0–0.07)
IMMATURE GRANULOCYTES: 0.3 %
LIPASE: 18.3 U/L (ref 5.6–51.3)
LYMPHOCYTES # BLD: 32.4 %
LYMPHOCYTES ABSOLUTE: 2.6 THOU/MM3 (ref 1–4.8)
MCH RBC QN AUTO: 30.8 PG (ref 26–33)
MCHC RBC AUTO-ENTMCNC: 33.8 GM/DL (ref 32.2–35.5)
MCV RBC AUTO: 90.9 FL (ref 80–94)
MDMA URINE: ABNORMAL
METHADONE SCREEN, URINE: ABNORMAL
METHAMPHETAMINE, URINE: ABNORMAL
MONOCYTES # BLD: 4.7 %
MONOCYTES ABSOLUTE: 0.4 THOU/MM3 (ref 0.4–1.3)
NUCLEATED RED BLOOD CELLS: 0 /100 WBC
OPIATE SCREEN URINE: ABNORMAL
OXYCODONE SCREEN URINE: ABNORMAL
PHENCYCLIDINE SCREEN URINE: ABNORMAL
PLATELET # BLD: 280 THOU/MM3 (ref 130–400)
PMV BLD AUTO: 9.6 FL (ref 9.4–12.4)
POTASSIUM REFLEX MAGNESIUM: 4 MEQ/L (ref 3.5–5.2)
PROPOXYPHENE SCREEN, URINE: ABNORMAL
RBC # BLD: 4.97 MILL/MM3 (ref 4.7–6.1)
SEG NEUTROPHILS: 61.1 %
SEGMENTED NEUTROPHILS ABSOLUTE COUNT: 4.8 THOU/MM3 (ref 1.8–7.7)
SODIUM BLD-SCNC: 139 MEQ/L (ref 135–145)
SYNTHETIC CANNABINOIDS(K2) SCREEN, URINE: ABNORMAL
THC SCREEN, URINE: ABNORMAL
TOTAL PROTEIN: 7.5 G/DL (ref 6.1–8)
TRAMADOL SCREEN URINE: ABNORMAL
TRICYCLIC ANTIDEPRESSANTS, UR: ABNORMAL
TROPONIN T: < 0.01 NG/ML
WBC # BLD: 7.9 THOU/MM3 (ref 4.8–10.8)

## 2021-08-26 PROCEDURE — 96374 THER/PROPH/DIAG INJ IV PUSH: CPT

## 2021-08-26 PROCEDURE — 36415 COLL VENOUS BLD VENIPUNCTURE: CPT

## 2021-08-26 PROCEDURE — 84484 ASSAY OF TROPONIN QUANT: CPT

## 2021-08-26 PROCEDURE — 80305 DRUG TEST PRSMV DIR OPT OBS: CPT | Performed by: NURSE PRACTITIONER

## 2021-08-26 PROCEDURE — 6360000004 HC RX CONTRAST MEDICATION: Performed by: EMERGENCY MEDICINE

## 2021-08-26 PROCEDURE — 96375 TX/PRO/DX INJ NEW DRUG ADDON: CPT

## 2021-08-26 PROCEDURE — 99284 EMERGENCY DEPT VISIT MOD MDM: CPT

## 2021-08-26 PROCEDURE — 93005 ELECTROCARDIOGRAM TRACING: CPT | Performed by: STUDENT IN AN ORGANIZED HEALTH CARE EDUCATION/TRAINING PROGRAM

## 2021-08-26 PROCEDURE — 85025 COMPLETE CBC W/AUTO DIFF WBC: CPT

## 2021-08-26 PROCEDURE — 6360000002 HC RX W HCPCS: Performed by: STUDENT IN AN ORGANIZED HEALTH CARE EDUCATION/TRAINING PROGRAM

## 2021-08-26 PROCEDURE — C9113 INJ PANTOPRAZOLE SODIUM, VIA: HCPCS | Performed by: STUDENT IN AN ORGANIZED HEALTH CARE EDUCATION/TRAINING PROGRAM

## 2021-08-26 PROCEDURE — 6360000002 HC RX W HCPCS

## 2021-08-26 PROCEDURE — 74177 CT ABD & PELVIS W/CONTRAST: CPT

## 2021-08-26 PROCEDURE — 96376 TX/PRO/DX INJ SAME DRUG ADON: CPT

## 2021-08-26 PROCEDURE — 80053 COMPREHEN METABOLIC PANEL: CPT

## 2021-08-26 PROCEDURE — 82272 OCCULT BLD FECES 1-3 TESTS: CPT

## 2021-08-26 PROCEDURE — 99214 OFFICE O/P EST MOD 30 MIN: CPT | Performed by: NURSE PRACTITIONER

## 2021-08-26 PROCEDURE — 83690 ASSAY OF LIPASE: CPT

## 2021-08-26 RX ORDER — ONDANSETRON 4 MG/1
4 TABLET, ORALLY DISINTEGRATING ORAL ONCE
Status: DISCONTINUED | OUTPATIENT
Start: 2021-08-26 | End: 2021-08-26

## 2021-08-26 RX ORDER — PROMETHAZINE HYDROCHLORIDE 25 MG/1
25 TABLET ORAL 3 TIMES DAILY PRN
Qty: 30 TABLET | Refills: 0 | Status: SHIPPED | OUTPATIENT
Start: 2021-08-26 | End: 2021-09-07 | Stop reason: SDUPTHER

## 2021-08-26 RX ORDER — ONDANSETRON 2 MG/ML
4 INJECTION INTRAMUSCULAR; INTRAVENOUS ONCE
Status: COMPLETED | OUTPATIENT
Start: 2021-08-26 | End: 2021-08-26

## 2021-08-26 RX ORDER — DIPHENHYDRAMINE HCL 25 MG
25 TABLET ORAL ONCE
Status: DISCONTINUED | OUTPATIENT
Start: 2021-08-26 | End: 2021-08-27 | Stop reason: HOSPADM

## 2021-08-26 RX ORDER — PANTOPRAZOLE SODIUM 40 MG/10ML
80 INJECTION, POWDER, LYOPHILIZED, FOR SOLUTION INTRAVENOUS ONCE
Status: COMPLETED | OUTPATIENT
Start: 2021-08-26 | End: 2021-08-26

## 2021-08-26 RX ORDER — DIPHENHYDRAMINE HYDROCHLORIDE 50 MG/ML
25 INJECTION INTRAMUSCULAR; INTRAVENOUS ONCE
Status: COMPLETED | OUTPATIENT
Start: 2021-08-26 | End: 2021-08-26

## 2021-08-26 RX ORDER — BUPRENORPHINE AND NALOXONE 8; 2 MG/1; MG/1
1 FILM, SOLUBLE BUCCAL; SUBLINGUAL 2 TIMES DAILY
Qty: 14 FILM | Refills: 0 | Status: SHIPPED | OUTPATIENT
Start: 2021-08-26 | End: 2021-09-07 | Stop reason: SDUPTHER

## 2021-08-26 RX ORDER — DIPHENHYDRAMINE HYDROCHLORIDE 50 MG/ML
INJECTION INTRAMUSCULAR; INTRAVENOUS
Status: COMPLETED
Start: 2021-08-26 | End: 2021-08-26

## 2021-08-26 RX ORDER — GABAPENTIN 800 MG/1
800 TABLET ORAL 3 TIMES DAILY
Qty: 90 TABLET | Refills: 0 | Status: SHIPPED | OUTPATIENT
Start: 2021-08-26 | End: 2021-09-24 | Stop reason: SDUPTHER

## 2021-08-26 RX ORDER — PANTOPRAZOLE SODIUM 40 MG/1
80 TABLET, DELAYED RELEASE ORAL ONCE
Status: DISCONTINUED | OUTPATIENT
Start: 2021-08-26 | End: 2021-08-26

## 2021-08-26 RX ADMIN — IOPAMIDOL 80 ML: 755 INJECTION, SOLUTION INTRAVENOUS at 21:09

## 2021-08-26 RX ADMIN — DIPHENHYDRAMINE HYDROCHLORIDE 25 MG: 50 INJECTION, SOLUTION INTRAMUSCULAR; INTRAVENOUS at 20:49

## 2021-08-26 RX ADMIN — DIPHENHYDRAMINE HYDROCHLORIDE 25 MG: 50 INJECTION INTRAMUSCULAR; INTRAVENOUS at 21:28

## 2021-08-26 RX ADMIN — PANTOPRAZOLE SODIUM 80 MG: 40 INJECTION, POWDER, FOR SOLUTION INTRAVENOUS at 20:01

## 2021-08-26 RX ADMIN — ONDANSETRON 4 MG: 2 INJECTION INTRAMUSCULAR; INTRAVENOUS at 20:00

## 2021-08-26 ASSESSMENT — PAIN SCALES - GENERAL: PAINLEVEL_OUTOF10: 8

## 2021-08-26 ASSESSMENT — ENCOUNTER SYMPTOMS
NAUSEA: 1
EYES NEGATIVE: 1
ABDOMINAL PAIN: 1
BLOOD IN STOOL: 1
ALLERGIC/IMMUNOLOGIC NEGATIVE: 1
VOMITING: 1
DIARRHEA: 1
COLOR CHANGE: 1
RESPIRATORY NEGATIVE: 1

## 2021-08-26 ASSESSMENT — PAIN DESCRIPTION - PAIN TYPE: TYPE: ACUTE PAIN

## 2021-08-26 ASSESSMENT — PAIN DESCRIPTION - LOCATION: LOCATION: ABDOMEN

## 2021-08-26 ASSESSMENT — PAIN DESCRIPTION - ORIENTATION: ORIENTATION: MID;UPPER

## 2021-08-26 ASSESSMENT — PAIN DESCRIPTION - DESCRIPTORS: DESCRIPTORS: SHARP;STABBING

## 2021-08-26 NOTE — ED NOTES
Pt in through lobby. He was sent from his family provider. He states the past 4 days he has had abdominal pain, dizziness, palpitations, and headaches. Today he states he vomited 4 times bright red blood. He has history of deep brain stimulator.       Jeanna Mcgee RN  08/26/21 [id number]

## 2021-08-26 NOTE — PROGRESS NOTES
daily for 3 days. , Disp: 6 Film, Rfl: 0    ondansetron (ZOFRAN ODT) 4 MG disintegrating tablet, Take 1 tablet by mouth every 8 hours as needed for Nausea, Disp: 10 tablet, Rfl: 0    sucralfate (CARAFATE) 1 GM tablet, Take 1 tablet by mouth 4 times daily, Disp: 60 tablet, Rfl: 0    diphenhydrAMINE (BENADRYL) 25 MG tablet, Take 25 mg by mouth 2 times daily , Disp: , Rfl:     cloNIDine (CATAPRES) 0.1 MG tablet, Take 0.1 mg by mouth 3 times daily, Disp: , Rfl:     hydrOXYzine (ATARAX) 50 MG tablet, Take 50 mg by mouth 2 times daily, Disp: , Rfl:     meloxicam (MOBIC) 7.5 MG tablet, Take 1 tablet by mouth 2 times daily as needed for Pain, Disp: 60 tablet, Rfl: 0    pantoprazole (PROTONIX) 40 MG tablet, Take 1 tablet by mouth every morning (before breakfast), Disp: 30 tablet, Rfl: 0    docusate sodium (COLACE) 100 MG capsule, Take 1 capsule by mouth 2 times daily, Disp: 60 capsule, Rfl: 0    QUEtiapine (SEROQUEL) 100 MG tablet, Take 1 tablet by mouth nightly, Disp: 60 tablet, Rfl: 0    Suvorexant (BELSOMRA) 20 MG TABS, Take 1 tablet by mouth daily for 30 days. , Disp: 30 tablet, Rfl: 0    ARIPiprazole (ABILIFY) 15 MG tablet, Take 1 tablet by mouth daily, Disp: 30 tablet, Rfl: 3    metFORMIN (GLUCOPHAGE) 500 MG tablet, Take 1 tablet by mouth 2 times daily (with meals), Disp: 60 tablet, Rfl: 0    Multiple Vitamins-Minerals (THERAPEUTIC MULTIVITAMIN-MINERALS) tablet, Take 1 tablet by mouth daily, Disp: 30 tablet, Rfl: 0    traZODone (DESYREL) 150 MG tablet, Take 1 tablet by mouth nightly, Disp: 30 tablet, Rfl: 0    The patient is allergic to dicyclomine, famotidine, geodon [ziprasidone hcl], haloperidol, iv dye [iodides], ibuprofen, aspirin, dicyclomine hcl, flagyl [metronidazole], iodine, and mirtazapine.     Past Medical History  Ron Palacios  has a past medical history of Anxiety, Arthritis, Asthma, Bipolar I disorder, most recent episode (or current) depressed, unspecified, Clostridium difficile infection, COPD (chronic obstructive pulmonary disease) ([facility] Depression, Disease of blood and blood forming organ, Eczema, Fracture, metacarpal, Gastric ulcer, Gastritis, GERD (gastroesophageal reflux disease), GI bleed, H. pylori infection, H/O blood clots, Head injury, Headache, Insomnia, Juvenile rheumatoid arthritis ([address] Neuromuscular disorder (HCC), PFAPA syndrome ([address] PUD (peptic ulcer disease), Rheumatoid arthritis ([address] Rheumatoid arthritis(714.0), Sleep apnea, 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[address] Substance abuse ([facility] Suicidal ideation, Suicide attempt by hanging ([facility] Tobacco dependence, Ulcerative colitis ([facility] and UTI (urinary tract infection). Past Surgical History  The patient  has a past surgical history that includes Colonoscopy; bronchoscopy; other surgical history; Upper gastrointestinal endoscopy (2/4/16); pr egd transoral biopsy single/multiple (N/A, 3/20/2017); sigmoidoscopy (N/A, 3/20/2017); Cholecystectomy, laparoscopic (07/14/2017); pr esophagogastroduodenoscopy transoral diagnostic (N/A, 8/9/2017); pr colonoscopy w/biopsy single/multiple (8/9/2017); Abdomen surgery; Upper gastrointestinal endoscopy (N/A, 6/13/2018); Upper gastrointestinal endoscopy (N/A, 9/20/2018); and Endoscopy, colon, diagnostic. Family History  This patient's family history includes Alcohol Abuse in his father; Arthritis in his father and mother; Colon Cancer (age of onset: 43) in his paternal cousin; Depression in his brother and father; Diabetes in his brother and father; High Blood Pressure in his father; Mental Illness in his brother; Migraines in his brother, father, mother, and sister; Other in his brother, brother, father, mother, and sister; Stroke in an other family member. Social History  Claire Velez  reports that he has been smoking cigarettes. He has a 7.50 pack-year smoking history. He has never used smokeless tobacco. He reports previous alcohol use. He reports previous drug use. Drug: Opiates .     Health Maintenance: Health Maintenance   Topic Date Due    Varicella vaccine (1 of 2 - 2-dose childhood series) Never done    COVID-19 Vaccine (1) Never done    DTaP/Tdap/Td vaccine (1 - Tdap) Never done    A1C test (Diabetic or Prediabetic)  10/23/2015    Flu vaccine (1) 09/01/2021    Pneumococcal 0-64 years Vaccine (2 of 2 - PPSV23) 10/20/2055    Hepatitis C screen  Completed    HIV screen  Completed    Hepatitis A vaccine  Aged Out    Hepatitis B vaccine  Aged Out    Hib vaccine  Aged Out    Meningococcal (ACWY) vaccine  Aged Out       Subjective:      Review of Systems   Cardiovascular: Positive for palpitations. Neurological: Positive for dizziness and headaches. Psychiatric/Behavioral: Positive for dysphoric mood. Negative for suicidal ideas. All other systems reviewed and are negative. Objective:     BP (!) 144/93 (Site: Right Lower Arm, Position: Sitting, Cuff Size: Medium Adult)   Pulse 90   Temp 97.2 °F (36.2 °C)   Ht 5' 9\" (1.753 m)   Wt 228 lb (103.4 kg)   BMI 33.67 kg/m²     Physical Exam  Vitals reviewed. Constitutional:       General: He is not in acute distress. Appearance: Normal appearance. He is not ill-appearing. HENT:      Head: Normocephalic and atraumatic. Right Ear: External ear normal.      Left Ear: External ear normal.      Nose: Nose normal. No congestion or rhinorrhea. Mouth/Throat:      Mouth: Mucous membranes are moist.   Eyes:      Extraocular Movements: Extraocular movements intact. Conjunctiva/sclera: Conjunctivae normal.      Pupils: Pupils are equal, round, and reactive to light. Pulmonary:      Effort: Pulmonary effort is normal. No respiratory distress. Musculoskeletal:         General: Normal range of motion. Cervical back: Normal range of motion and neck supple. Right lower leg: No edema. Left lower leg: No edema. Skin:     General: Skin is warm and dry. Neurological:      General: No focal deficit present.       Mental Status: He is alert and oriented to person, place, and time. Psychiatric:         Mood and Affect: Mood normal.         Behavior: Behavior normal.         Thought Content: Thought content is paranoid. Judgment: Judgment normal.         Labs Reviewed 8/26/2021:    Lab Results   Component Value Date    WBC 7.9 08/26/2021    HGB 15.3 08/26/2021    HCT 45.2 08/26/2021     08/26/2021    CHOL 205 (H) 02/21/2017    TRIG 189 (H) 02/21/2017    HDL 44 02/21/2017    ALT 9 (L) 08/26/2021    AST 24 08/26/2021     08/26/2021    K 4.0 08/26/2021     08/26/2021    CREATININE 0.8 08/26/2021    BUN 11 08/26/2021    CO2 27 08/26/2021    TSH 0.59 03/28/2018    INR 1.0 05/09/2018    LABA1C 5.7 10/23/2014       Assessment/Plan      1. Severe opioid use disorder (HCC)    - POCT Rapid Drug Screen  - Continue Suboxone 8 mg BID  - Narcan at home  - OARRS reviewed, no discrepancies  - Attend NA/AA meetings and/or arrange for individualized counseling   - Arrange visit with psychiatry ASAP    2. S/P deep brain stimulator placement    - XR SKULL (MIN 4 VIEWS); Future  - XR FEMUR RIGHT (MIN 2 VIEWS); Future    3. Fibromyalgia    - gabapentin (NEURONTIN) 800 MG tablet; Take 1 tablet by mouth 3 times daily for 30 days. Dispense: 90 tablet; Refill: 0    4. Nausea and vomiting, intractability of vomiting not specified, unspecified vomiting type    - Phenergan 25 mg TID PRN       Return in about 1 week (around 9/2/2021). Patient given educational materials - see patient instructions. Discussed use, benefit, and side effects of prescribed medications. All patient questions answered. Pt voiced understanding. Reviewed health maintenance.        Electronically signed MASSIEL Rushing - CNP on 8/26/21 at 11:45 AM EDT

## 2021-08-26 NOTE — ED PROVIDER NOTES
[facility] ENCOUN[facility]          Pt Name: Lila Rutherford  MRN: [id number]  [place] 1990  Date of evaluation: 8/26/2021  Treating Resident Physician: Harpreet Zavala MD  Supervising Physician: Chase Ramírez MD    [address] [place] [address]       Chief Complaint   Patient presents with    Hematemesis    Abdominal Pain     History obtained from the patient and chart review. HISTORY OF PRESENT ILLNESS    HPI  Lila Rutherford is a 27 y.o. male who presents to the emergency department for evaluation of hematemesis and abdominal pain. He states that over the last 2-3 days he has had an increase in frequency of vomiting approximately 4-5 times a day. He states that beginning yesterday he was noticing \"spots\" of bright red blood in his vomit. This has been associated with abdominal tenderness in the midline above the umbilicus rated 8/02 in severity. During this time he has also been having loss of appetite, not able to tolerate PO intake including liquids, diarrhea with dark tarry stools. He is also having chills feeling feverish and intense itching of the skin overlying his chest, legs, and back. He also complains of headache and new onset paleness of the skin. He has a h/o cholecystectomy and placement of deep brain stimulator in 2018 and states that he has been having weekly vomiting since those procedures. The patient has no other acute complaints at this time. REVIEW OF SYSTEMS   Review of Systems   Constitutional: Positive for chills and fever. HENT: Negative. Eyes: Negative. Respiratory: Negative. Cardiovascular: Negative. Gastrointestinal: Positive for abdominal pain, blood in stool, diarrhea, nausea and vomiting. Endocrine: Negative. Genitourinary: Negative. Musculoskeletal: Negative. Skin: Positive for color change. Allergic/Immunologic: Negative. Neurological: Positive for headaches.  Negative for tremors, seizures, syncope and weakness. Hematological: Negative.           PAST MEDICAL AND SURGICAL HISTORY     Past Medical History:   Diagnosis Date    Anxiety     Arthritis     Asthma     Bipolar I disorder, most recent episode (or current) depressed, unspecified 9/12/2014    Clostridium difficile infection     COPD (chronic obstructive pulmonary disease) ([address]     Depression     Disease of blood and blood forming organ     Eczema     Fracture, metacarpal     R 4th and 5th    Gastric ulcer     Gastritis 06/13/2018    GERD (gastroesophageal reflux disease)     GI bleed     H. 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[address]     Substance abuse ([address]     Suicidal ideation     Suicide attempt by hanging ([address]     Tobacco dependence     Ulcerative colitis ([address]     UTI (urinary tract infection)      Past Surgical History:   Procedure Laterality Date    ABDOMEN SURGERY      upper GI scope 7/7/2015    BRONCHOSCOPY      CHOLECYSTECTOMY, LAPAROSCOPIC  07/14/2017    surgery performed at [address] [place] [address], COLON, DIAGNOSTIC      OTHER SURGICAL HISTORY      lumbar puncture    MN COLONOSCOPY W/BIOPSY SINGLE/MULTIPLE  8/9/2017    COLONOSCOPY WITH BIOPSY performed by Osbaldo Ortiz MD at [facility]    MN EGD TRANSORAL BIOPSY SINGLE/MULTIPLE N/A 3/20/2017    EGD BIOPSY performed by Sushila Warner MD at [facility]    MN ESOPHAGOGASTRODUODENOSCOPY TRANSORAL DIAGNOSTIC N/A 8/9/2017    EGD ESOPHAGOGASTRODUODENOSCOPY performed by Osbaldo Ortiz MD at [address] [place] [address] N/A 3/20/2017    SIGMOIDOSCOPY DIAGNOSTIC FLEXIBLE performed by Sushila Warner MD at [facility]    UPPER GASTROINTESTINAL ENDOSCOPY  2/4/16    UPPER GASTROINTESTINAL ENDOSCOPY N/A 6/13/2018    GASTRITIS    UPPER GASTROINTESTINAL ENDOSCOPY N/A 9/20/2018    EGD BIOPSY performed by Jarad Martins MD at [facility]     Current Facility-Administered Medications:     diphenhydrAMINE (BENADRYL) tablet 25 mg, 25 mg, Oral, Once, Harpreet Zavala MD    Current Outpatient Medications:     buprenorphine-naloxone (SUBOXONE) 8-2 MG FILM SL film, Place 1 Film under the tongue 2 times daily for 7 days. , Disp: 14 Film, Rfl: 0    gabapentin (NEURONTIN) 800 MG tablet, Take 1 tablet by mouth 3 times daily for 30 days. , Disp: 90 tablet, Rfl: 0    promethazine (PHENERGAN) 25 MG tablet, Take 1 tablet by mouth 3 times daily as needed for Nausea, Disp: 30 tablet, Rfl: 0    ondansetron (ZOFRAN ODT) 4 MG disintegrating tablet, Take 1 tablet by mouth every 8 hours as needed for Nausea (Patient not taking: Reported on 8/24/2021), Disp: 10 tablet, Rfl: 0    sucralfate (CARAFATE) 1 GM tablet, Take 1 tablet by mouth 4 times daily, Disp: 60 tablet, Rfl: 0    diphenhydrAMINE (BENADRYL) 25 MG tablet, Take 25 mg by mouth 2 times daily (Patient not taking: Reported on 8/24/2021), Disp: , Rfl:     cloNIDine (CATAPRES) 0.1 MG tablet, Take 0.1 mg by mouth 3 times daily, Disp: , Rfl:     hydrOXYzine (ATARAX) 50 MG tablet, Take 50 mg by mouth 2 times daily, Disp: , Rfl:     meloxicam (MOBIC) 7.5 MG tablet, Take 1 tablet by mouth 2 times daily as needed for Pain, Disp: 60 tablet, Rfl: 0    tiZANidine (ZANAFLEX) 4 MG tablet, Take 1 tablet by mouth 3 times daily as needed (SCIATICA), Disp: 30 tablet, Rfl: 0    pantoprazole (PROTONIX) 40 MG tablet, Take 1 tablet by mouth every morning (before breakfast), Disp: 30 tablet, Rfl: 0    docusate sodium (COLACE) 100 MG capsule, Take 1 capsule by mouth 2 times daily, Disp: 60 capsule, Rfl: 0    QUEtiapine (SEROQUEL) 100 MG tablet, Take 1 tablet by mouth nightly, Disp: 60 tablet, Rfl: 0    Suvorexant (BELSOMRA) 20 MG TABS, Take 1 tablet by mouth daily for 30 days. , Disp: 30 tablet, Rfl: 0    ARIPiprazole (ABILIFY) 15 MG tablet, Take 1 tablet by mouth daily, Disp: 30 tablet, Rfl: 3    metFORMIN (GLUCOPHAGE) 500 MG tablet, Take 1 tablet by mouth 2 times daily (with meals), Disp: 60 tablet, Rfl: 0    Multiple Vitamins-Minerals (THERAPEUTIC MULTIVITAMIN-MINERALS) tablet, Take 1 tablet by mouth daily, Disp: 30 tablet, Rfl: 0    traZODone (DESYREL) 150 MG tablet, Take 1 tablet by mouth nightly, Disp: 30 tablet, Rfl: 0      SOCIAL HISTORY     Social History     Social History Narrative    ** Merged History Encounter **          Social History     Tobacco Use    Smoking status: Current Every Day Smoker     Packs/day: 1.00     Years: 15.00     Pack years: 15.00     Types: Cigarettes    Smokeless tobacco: Never Used   Vaping Use    Vaping Use: Former   Substance Use Topics    Alcohol use: Not Currently     Comment: social ETOH use    Drug use: Not Currently     Types: Opiates      Comment: last used pain pill/fentanyl 2/18/21         ALLERGIES     Allergies   Allergen Reactions    Dicyclomine Anaphylaxis    Famotidine Anaphylaxis    Geodon [Ziprasidone Hcl] Anaphylaxis    Haloperidol Anaphylaxis     Other reaction(s): throat swells  Throat swells      Iv Dye [Iodides] Nausea And Vomiting and Rash     Ok to take with benadryl - itching only no rash or anaphylaxis  Needs benadryl.     Ibuprofen      Other reaction(s): GI Intolerance    Aspirin      Pressure in skin    Dicyclomine Hcl      08--allergy removed-pt sts today it is his allergy  Other reaction(s): Itching    Flagyl [Metronidazole] Swelling     Patient says he isn't allergic to this med 08-  Today 08- it is an allergy  02/13/19 states he is not allergic     Iodine      Other reaction(s): Hives, Itching, Nausea/Vomiting    Mirtazapine          FAMILY HISTORY     Family History   Problem Relation Age of Onset    Diabetes Father     Alcohol Abuse Father     Depression Father     Arthritis Father     High Blood Pressure Father     Other Father         aneurysm & epilepsy    Migraines Father     Arthritis Mother     Other Mother         aneurysm & epilepsy    Migraines Mother     Diabetes Brother         Aunt and uncles    Depression Brother     Mental Illness Brother     Other Brother         epilepsy    Migraines Brother     Stroke Other         Uncle    Other Brother         murdered Oct 6th, 2014    Colon Cancer Paternal Cousin 43    Other Sister         epilepsy    Migraines Sister          PREVIOUS RECORDS   Previous records reviewed: Has a history of malingering, drug-seeking, psychiatric illness, drug abuse. [facility] PHYSICAL EXAM     ED Triage Vitals [08/26/21 1752]   BP Temp Temp Source Pulse Resp SpO2 Height Weight   (!) 149/93 98.5 °F (36.9 °C) Oral 91 18 99 % -- 250 lb (113.4 kg)     Initial vital signs and nursing assessment reviewed and abnormal from Hypertension. Body mass index is 36.92 kg/m². Pulsoximetry is normal per my interpretation. Additional Vital Signs:  Vitals:    08/26/21 2052   BP: 128/76   Pulse: 73   Resp: 16   Temp:    SpO2: 95%       Physical Exam  Vitals reviewed. Constitutional:       Appearance: He is well-developed and normal weight. HENT:      Head: Normocephalic and atraumatic. Eyes:      General: No scleral icterus. Extraocular Movements: Extraocular movements intact. Pupils: Pupils are equal, round, and reactive to light. Cardiovascular:      Rate and Rhythm: Normal rate and regular rhythm. Heart sounds: Normal heart sounds, S1 normal and S2 normal.   Pulmonary:      Effort: Pulmonary effort is normal.      Breath sounds: Normal breath sounds. Abdominal:      General: Bowel sounds are normal.      Palpations: Abdomen is soft. Tenderness: There is generalized abdominal tenderness.       Hernia: No hernia is present. Skin:     General: Skin is warm and dry. Neurological:      General: No focal deficit present. Mental Status: He is alert and oriented to person, place, and time. GCS: GCS eye subscore is 4. GCS verbal subscore is 5. GCS motor subscore is 6. Cranial Nerves: Cranial nerves are intact. Sensory: Sensation is intact. Motor: Motor function is intact. Psychiatric:         Mood and Affect: Mood normal.         Behavior: Behavior normal. Behavior is cooperative. MEDICAL DECISION MAKING   Initial Assessment:   1. Abdominal pain. 2. Nausea  3. Malingering  Plan:    Signs stable, afebrile   Occult blood negative   CBC, CMP unremarkable   Lipase within normal limits   Troponin negative   Hepatic function panel within normal limits   CT abdomen and pelvis unremarkable   Patient has been inconsistent with his history and has not been matching our chart review. He has also been refusing any p.o. medication. No sign of vomiting here.  IV Zofran and IV Protonix given, Benadryl injections also given   In my assessment this patient is stable and we have no cause of abdominal pain. He has exhibited drug-seeking behaviors while he has been here.  We will discharge patient with strict return precautions and encouraged follow-up with primary care physician. ED RESULTS   Laboratory results:  Labs Reviewed   COMPREHENSIVE METABOLIC PANEL W/ REFLEX TO MG FOR LOW K - Abnormal; Notable for the following components:       Result Value    Total Bilirubin 0.2 (*)     ALT 9 (*)     All other components within normal limits   CBC WITH AUTO DIFFERENTIAL   HEPATIC FUNCTION PANEL   LIPASE   TROPONIN   BLOOD OCCULT STOOL SCREEN #1   ANION GAP   GLOMERULAR FILTRATION RATE, ESTIMATED   URINE DRUG SCREEN       Radiologic studies results:  CT ABDOMEN PELVIS W IV CONTRAST Additional Contrast? None   Final Result      No acute intra-abdominal or intrapelvic findings.       Final report electronically signed by Dr. Catalino Gupta on 8/26/2021 9:29 PM          ED Medications administered this visit:   Medications   diphenhydrAMINE (BENADRYL) tablet 25 mg (has no administration in time range)   pantoprazole (PROTONIX) injection 80 mg (80 mg IntraVENous Given 8/26/21 2001)   ondansetron (ZOFRAN) injection 4 mg (4 mg IntraVENous Given 8/26/21 2000)   diphenhydrAMINE (BENADRYL) injection 25 mg (25 mg IntraVENous Given 8/26/21 2049)   iopamidol (ISOVUE-370) 76 % injection 80 mL (80 mLs IntraVENous Given 8/26/21 2109)   diphenhydrAMINE (BENADRYL) injection 25 mg (25 mg IntraVENous Given 8/26/21 2128)         ED COURSE     ED Course as of Aug 26 2202   Thu Aug 26, 2021   1957 Refusing any p.o. medications stating that he is too nauseous currently to take anything via p.o.    [JT]   2057 Patient seen wandering around the emergency department. [JT]   2103 Patient still complaining of nausea at this time. Patient states that he now has leg pain. [JT]   2119 Patient coming back from CT scan with IV contrast.  Patient states that he is having some difficulty breathing and throat swelling. Patient offered another 25 mg of Benadryl he is refusing p.o. medications at this time. [JT]   2154 Patient refusing a urine sample at this time. [JT]   2159 Patient is asking for a pain medicine injection like Toradol. [JT]      ED Course User Index  [JT] Gifford Brittle, MD       Strict return precautions and follow up instructions were discussed with the patient prior to discharge, with which the patient agrees. MEDICATION CHANGES     New Prescriptions    No medications on file         FINAL DISPOSITION     Final diagnoses:   Generalized abdominal pain   Nausea   Malingering     Condition: condition: stable  Dispo: Discharge to home      This transcription was electronically signed.  Parts of this transcriptions may have been dictated by use of voice recognition software and electronically

## 2021-08-27 ENCOUNTER — CARE COORDINATION (OUTPATIENT)
Dept: CARE COORDINATION | Age: 31
End: 2021-08-27

## 2021-08-27 LAB
EKG ATRIAL RATE: 87 BPM
EKG P AXIS: 18 DEGREES
EKG P-R INTERVAL: 124 MS
EKG Q-T INTERVAL: 324 MS
EKG QRS DURATION: 82 MS
EKG QTC CALCULATION (BAZETT): 389 MS
EKG R AXIS: 42 DEGREES
EKG T AXIS: 35 DEGREES
EKG VENTRICULAR RATE: 87 BPM

## 2021-08-27 PROCEDURE — 93010 ELECTROCARDIOGRAM REPORT: CPT | Performed by: INTERNAL MEDICINE

## 2021-08-27 NOTE — ED NOTES
Pt removed BP cuff and pulse ox. Pt standing in doorway, asking for RN. Tech informed pt RN will be in when she is finished with her other pt. Pt continues to pace in room and  doorway. RN at bedside. Pt asking when he will go to CT scan. Pt informed pt they would get him when they were ready and after all his labs were resulted. RN provided pt with urine cup to collect specimen. Pt ambulated to bathroom. When pt returned he stated he was unable to go to the bathroom.         Becka Vega RN  08/26/21 5744

## 2021-08-27 NOTE — ED PROVIDER NOTES
ATTENDING NOTE:    I supervised and discussed the history, physical exam and the management of this patient with the resident. I reviewed the resident's note and agree with the documented findings and plan of care. Please see my additional note. I personally saw and examined the patient. I have reviewed and agree with the resident's findings, including all diagnostic interpretations and treatment plans as written. I was present for the key portion of any procedures performed and the inclusive time noted in any critical care statement.     Electronically verified by Annie Hendrickson MD  08/26/21 7945

## 2021-08-27 NOTE — CARE COORDINATION
Attempted to reach [facility] today for ED f/u. No answer. Unable to leave message.

## 2021-08-27 NOTE — ED NOTES
Pt refusing to take benadryl PO. Pt states his stomach is still bothering him. Pt has not had an episode of emesis since arrival to ED room at 2200. Pt asking to speak to Dr. Dr Tammy Lei at bedside to speak with pt.       Armin Gomez RN  08/26/21 6330

## 2021-08-30 ENCOUNTER — HOSPITAL ENCOUNTER (EMERGENCY)
Age: 31
Discharge: HOME | End: 2021-08-30
Payer: MEDICARE

## 2021-08-30 ENCOUNTER — CARE COORDINATION (OUTPATIENT)
Dept: CARE COORDINATION | Age: 31
End: 2021-08-30

## 2021-08-30 VITALS
OXYGEN SATURATION: 96 % | DIASTOLIC BLOOD PRESSURE: 81 MMHG | HEIGHT: 69 IN | HEART RATE: 87 BPM | TEMPERATURE: 98.6 F | SYSTOLIC BLOOD PRESSURE: 128 MMHG | RESPIRATION RATE: 16 BRPM | WEIGHT: 250 LBS | BODY MASS INDEX: 37.03 KG/M2

## 2021-08-30 ASSESSMENT — PAIN SCALES - GENERAL: PAINLEVEL_OUTOF10: 8

## 2021-08-30 NOTE — CARE COORDINATION
Spoke with Jayro Padron briefly. Reports that he is doing ok but cut his hand with a knife while trying to get a frozen hamburger out of pkg. Thinks he may need stitches. Advised pt to wrap with bandage and apply pressure and ice. Encouraged pt to be seen at [facility]. Pt shared that he recently moved out of [facility]. Now living in group recovery house. Pt states that it is called [facility]. Unable to provide me with address. Will attempt to f/u with pt later this wk.

## 2021-09-01 ENCOUNTER — CARE COORDINATION (OUTPATIENT)
Dept: CARE COORDINATION | Age: 31
End: 2021-09-01

## 2021-09-02 ENCOUNTER — CARE COORDINATION (OUTPATIENT)
Dept: CARE COORDINATION | Age: 31
End: 2021-09-02

## 2021-09-07 ENCOUNTER — NURSE ONLY (OUTPATIENT)
Dept: INTERNAL MEDICINE CLINIC | Age: 31
End: 2021-09-07
Payer: MEDICARE

## 2021-09-07 DIAGNOSIS — F11.20 SEVERE OPIOID USE DISORDER (HCC): Primary | ICD-10-CM

## 2021-09-07 PROCEDURE — 80305 DRUG TEST PRSMV DIR OPT OBS: CPT | Performed by: NURSE PRACTITIONER

## 2021-09-07 RX ORDER — BUPRENORPHINE AND NALOXONE 8; 2 MG/1; MG/1
1 FILM, SOLUBLE BUCCAL; SUBLINGUAL 2 TIMES DAILY
Qty: 6 FILM | Refills: 0 | Status: SHIPPED | OUTPATIENT
Start: 2021-09-07 | End: 2021-09-10 | Stop reason: SDUPTHER

## 2021-09-07 RX ORDER — TIZANIDINE 4 MG/1
4 TABLET ORAL 3 TIMES DAILY PRN
Qty: 30 TABLET | Refills: 0 | Status: CANCELLED | OUTPATIENT
Start: 2021-09-07

## 2021-09-07 RX ORDER — PROMETHAZINE HYDROCHLORIDE 25 MG/1
25 TABLET ORAL 3 TIMES DAILY PRN
Qty: 30 TABLET | Refills: 0 | Status: CANCELLED | OUTPATIENT
Start: 2021-09-07

## 2021-09-07 RX ORDER — TIZANIDINE 4 MG/1
4 TABLET ORAL 3 TIMES DAILY PRN
Qty: 30 TABLET | Refills: 0 | Status: SHIPPED | OUTPATIENT
Start: 2021-09-07 | End: 2021-09-24 | Stop reason: SDUPTHER

## 2021-09-07 RX ORDER — PROMETHAZINE HYDROCHLORIDE 25 MG/1
25 TABLET ORAL 3 TIMES DAILY PRN
Qty: 30 TABLET | Refills: 0 | Status: SHIPPED | OUTPATIENT
Start: 2021-09-07 | End: 2021-10-07 | Stop reason: SDUPTHER

## 2021-09-10 ENCOUNTER — OFFICE VISIT (OUTPATIENT)
Dept: INTERNAL MEDICINE CLINIC | Age: 31
End: 2021-09-10
Payer: MEDICARE

## 2021-09-10 VITALS
SYSTOLIC BLOOD PRESSURE: 122 MMHG | WEIGHT: 250 LBS | TEMPERATURE: 97.5 F | HEART RATE: 74 BPM | HEIGHT: 69 IN | DIASTOLIC BLOOD PRESSURE: 80 MMHG | BODY MASS INDEX: 37.03 KG/M2

## 2021-09-10 DIAGNOSIS — L02.414 ABSCESS OF ARM, LEFT: ICD-10-CM

## 2021-09-10 DIAGNOSIS — F11.20 SEVERE OPIOID USE DISORDER (HCC): Primary | ICD-10-CM

## 2021-09-10 PROCEDURE — G8417 CALC BMI ABV UP PARAM F/U: HCPCS | Performed by: NURSE PRACTITIONER

## 2021-09-10 PROCEDURE — 4004F PT TOBACCO SCREEN RCVD TLK: CPT | Performed by: NURSE PRACTITIONER

## 2021-09-10 PROCEDURE — 99214 OFFICE O/P EST MOD 30 MIN: CPT | Performed by: NURSE PRACTITIONER

## 2021-09-10 PROCEDURE — 80305 DRUG TEST PRSMV DIR OPT OBS: CPT | Performed by: NURSE PRACTITIONER

## 2021-09-10 PROCEDURE — G8428 CUR MEDS NOT DOCUMENT: HCPCS | Performed by: NURSE PRACTITIONER

## 2021-09-10 RX ORDER — CEPHALEXIN 500 MG/1
500 CAPSULE ORAL 4 TIMES DAILY
Qty: 28 CAPSULE | Refills: 0 | Status: SHIPPED | OUTPATIENT
Start: 2021-09-10 | End: 2021-09-17

## 2021-09-10 RX ORDER — BUPRENORPHINE AND NALOXONE 8; 2 MG/1; MG/1
1 FILM, SOLUBLE BUCCAL; SUBLINGUAL 2 TIMES DAILY
Qty: 28 FILM | Refills: 0 | Status: SHIPPED | OUTPATIENT
Start: 2021-09-10 | End: 2021-09-24 | Stop reason: SDUPTHER

## 2021-09-10 NOTE — PROGRESS NOTES
[address] INTERNAL MEDICINE AND MEDICATION MANAGEMENT  [facility] [address]  Dept: [phone]  Dept Fax: [phone]: [phone]     Visit Date:  9/10/2021    Patient:  Trixie Burnette  YOB: 1990    HPI:     Chief Complaint   Patient presents with    Drug Problem     Tay Arenas presents today for medical evaluation of severe opioid use disorder, fibromyalgia, evaluation of deep brain stimulator, left upper arm abscess      I last seen him 2 weeks ago. He missed scheduled visit, therefore was seen      Denies any use     Urine positive for buprenorphine only     Urges and cravings controlled with Suboxone 8 mg BID    Tay Arenas reports that his aunt and uncle recently  unexpectedly and that he will be going to [place] for 2 weeks. Fibromyalgia controlled with gabapentin. History of juvenile RA. Referred to rheumatology for evaluation.      Tay Arenas is pre-occupied again with this deep brain stimulator. He states that it was inserted 2018 at [facility] by Dr. Tylor Hodges as part of a PTSD study. He is convinced that his body and mind are now being controlled via the stimulator. [facility] did not have any records. He insists that this is some type of conspiracy. No history of paranoid schizophrenia that I am aware of. Was on Zyprexa previously, states that it made him feel weird.      CT Head Without 19  Impression   No acute intracranial abnormality.        Tay Arenas has a left upper arm abscess. Denies any intravenous drug use. There is minimal redness and inflammation. He is afebrile. Medications    Current Outpatient Medications:     buprenorphine-naloxone (SUBOXONE) 8-2 MG FILM SL film, Place 1 Film under the tongue 2 times daily for 14 days. , Disp: 28 Film, Rfl: 0    gabapentin (NEURONTIN) 800 MG tablet, Take 1 tablet by mouth 3 times daily for 30 days. , Disp: 90 tablet, Rfl: 0    tiZANidine (ZANAFLEX) 4 MG tablet, Take 1 tablet by mouth 3 times daily as needed (SCIATICA), Disp: 30 tablet, Rfl: 0    Suvorexant (BELSOMRA) 5 MG TABS, Take 5 mg by mouth daily for 30 days. , Disp: 30 tablet, Rfl: 0    promethazine (PHENERGAN) 25 MG tablet, Take 1 tablet by mouth 3 times daily as needed for Nausea, Disp: 30 tablet, Rfl: 0    diphenhydrAMINE-Zinc Acetate (BENADRYL ITCH RELIEF) 2-0.1 % STCK, Apply 1 each topically 2 times daily, Disp: 1 each, Rfl: 0    promethazine (PHENERGAN) 25 MG tablet, Take 1 tablet by mouth 3 times daily as needed for Nausea, Disp: 30 tablet, Rfl: 0    ondansetron (ZOFRAN ODT) 4 MG disintegrating tablet, Take 1 tablet by mouth every 8 hours as needed for Nausea, Disp: 10 tablet, Rfl: 0    sucralfate (CARAFATE) 1 GM tablet, Take 1 tablet by mouth 4 times daily, Disp: 60 tablet, Rfl: 0    diphenhydrAMINE (BENADRYL) 25 MG tablet, Take 25 mg by mouth 2 times daily , Disp: , Rfl:     cloNIDine (CATAPRES) 0.1 MG tablet, Take 0.1 mg by mouth 3 times daily, Disp: , Rfl:     hydrOXYzine (ATARAX) 50 MG tablet, Take 50 mg by mouth 2 times daily, Disp: , Rfl:     meloxicam (MOBIC) 7.5 MG tablet, Take 1 tablet by mouth 2 times daily as needed for Pain, Disp: 60 tablet, Rfl: 0    pantoprazole (PROTONIX) 40 MG tablet, Take 1 tablet by mouth every morning (before breakfast), Disp: 30 tablet, Rfl: 0    QUEtiapine (SEROQUEL) 100 MG tablet, Take 1 tablet by mouth nightly, Disp: 60 tablet, Rfl: 0    ARIPiprazole (ABILIFY) 15 MG tablet, Take 1 tablet by mouth daily, Disp: 30 tablet, Rfl: 3    metFORMIN (GLUCOPHAGE) 500 MG tablet, Take 1 tablet by mouth 2 times daily (with meals), Disp: 60 tablet, Rfl: 0    Multiple Vitamins-Minerals (THERAPEUTIC MULTIVITAMIN-MINERALS) tablet, Take 1 tablet by mouth daily, Disp: 30 tablet, Rfl: 0    traZODone (DESYREL) 150 MG tablet, Take 1 tablet by mouth nightly, Disp: 30 tablet, Rfl: 0    The patient is allergic to dicyclomine, famotidine, geodon [ziprasidone hcl], haloperidol, iv dye [iodides], ibuprofen, aspirin, dicyclomine hcl, flagyl [metronidazole], iodine, and mirtazapine. Past Medical History  Yunier Sheikh  has a past medical history of Anxiety, Arthritis, Asthma, Bipolar I disorder, most recent episode (or current) depressed, unspecified, Clostridium difficile infection, COPD (chronic obstructive pulmonary disease) ([address] Depression, Disease of blood and blood forming organ, Eczema, Fracture, metacarpal, Gastric ulcer, Gastritis, GERD (gastroesophageal reflux disease), GI bleed, H. pylori infection, H/O blood clots, Head injury, Headache, Insomnia, Juvenile rheumatoid arthritis ([address] Neuromuscular disorder ([address] PFAPA syndrome ([address] PUD (peptic ulcer disease), Rheumatoid arthritis ([address] Rheumatoid arthritis(714.0), Sleep apnea, 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[address] Substance abuse ([address] Suicidal ideation, Suicide attempt by hanging ([address] Tobacco dependence, Ulcerative colitis ([address] and UTI (urinary tract infection). Past Surgical History  The patient  has a past surgical history that includes Colonoscopy; bronchoscopy; other surgical history; Upper gastrointestinal endoscopy (2/4/16); pr egd transoral biopsy single/multiple (N/A, 3/20/2017); sigmoidoscopy (N/A, 3/20/2017); Cholecystectomy, laparoscopic (07/14/2017); pr esophagogastroduodenoscopy transoral diagnostic (N/A, 8/9/2017); pr colonoscopy w/biopsy single/multiple (8/9/2017); Abdomen surgery; Upper gastrointestinal endoscopy (N/A, 6/13/2018); Upper gastrointestinal endoscopy (N/A, 9/20/2018); and Endoscopy, colon, diagnostic. Family History  This patient's family history includes Alcohol Abuse in his father; Arthritis in his father and mother; Colon Cancer (age of onset: 43) in his paternal cousin; Depression in his brother and father; Diabetes in his brother and father; High Blood Pressure in his father; Mental Illness in his brother; Migraines in his brother, father, mother, and sister;  Other in his brother, brother, father, mother, and sister; Stroke in an other family member. Social History  Jarad Colby  reports that he has been smoking cigarettes. He has a 7.50 pack-year smoking history. He has never used smokeless tobacco. He reports previous alcohol use. He reports previous drug use. Drug: Opiates . Health Maintenance:    Health Maintenance   Topic Date Due    Varicella vaccine (1 of 2 - 2-dose childhood series) Never done    COVID-19 Vaccine (1) Never done    DTaP/Tdap/Td vaccine (1 - Tdap) Never done    A1C test (Diabetic or Prediabetic)  10/23/2015    Flu vaccine (1) 09/01/2021    Annual Wellness Visit (AWV)  Never done    Pneumococcal 0-64 years Vaccine (2 of 2 - PPSV23) 10/20/2055    Hepatitis C screen  Completed    HIV screen  Completed    Hepatitis A vaccine  Aged Out    Hepatitis B vaccine  Aged Out    Hib vaccine  Aged Out    Meningococcal (ACWY) vaccine  Aged Out       Subjective:      Review of Systems   Cardiovascular: Negative for palpitations. Neurological: Positive for headaches. Negative for dizziness. Psychiatric/Behavioral: Negative for dysphoric mood and suicidal ideas. All other systems reviewed and are negative. Objective:     /80 (Site: Right Lower Arm, Position: Sitting, Cuff Size: Medium Adult)   Pulse 74   Temp 97.5 °F (36.4 °C)   Ht 5' 9\" (1.753 m)   Wt 250 lb (113.4 kg)   BMI 36.92 kg/m²     Physical Exam  Vitals reviewed. Constitutional:       General: He is not in acute distress. Appearance: Normal appearance. He is not ill-appearing. HENT:      Head: Normocephalic and atraumatic. Right Ear: External ear normal.      Left Ear: External ear normal.      Nose: Nose normal. No congestion or rhinorrhea. Mouth/Throat:      Mouth: Mucous membranes are moist.   Eyes:      Extraocular Movements: Extraocular movements intact. Conjunctiva/sclera: Conjunctivae normal.      Pupils: Pupils are equal, round, and reactive to light.    Pulmonary:      Effort: Pulmonary effort is normal. No respiratory distress. Musculoskeletal:         General: Normal range of motion. Cervical back: Normal range of motion and neck supple. Right lower leg: No edema. Left lower leg: No edema. Skin:     General: Skin is warm and dry. Neurological:      General: No focal deficit present. Mental Status: He is alert and oriented to person, place, and time. Psychiatric:         Mood and Affect: Mood normal.         Behavior: Behavior normal.         Thought Content: Thought content is paranoid. Judgment: Judgment normal.         Labs Reviewed 9/10/2021:    Lab Results   Component Value Date    WBC 6.4 09/19/2021    HGB 13.3 09/19/2021    HCT 41.3 09/19/2021     09/19/2021    CHOL 205 (H) 02/21/2017    TRIG 189 (H) 02/21/2017    HDL 44 02/21/2017    ALT 7 09/19/2021    AST 17 09/19/2021     (L) 09/19/2021    K 4.0 09/19/2021     09/19/2021    CREATININE 0.75 09/19/2021    BUN 7 09/19/2021    CO2 22 09/19/2021    TSH 0.59 03/28/2018    INR 1.0 05/09/2018    LABA1C 5.7 10/23/2014       Assessment/Plan      1. Severe opioid use disorder (HCC)    - POCT Rapid Drug Screen  - Continue Suboxone 8 mg BID  - Narcan at home  - OARRS reviewed, no discrepancies  - Attend NA/AA meetings and/or arrange for individualized counseling   - Arrange visit with psychiatry ASAP    2. Abscess of arm, left    - cephALEXin (KEFLEX) 500 MG capsule; Take 1 capsule by mouth 4 times daily for 7 days  Dispense: 28 capsule; Refill: 0      Return in about 2 weeks (around 9/24/2021). Patient given educational materials - see patient instructions. Discussed use, benefit, and side effects of prescribed medications. All patient questions answered. Pt voiced understanding. Reviewed health maintenance.        Electronically signed MASSIEL Flores - CNP on 9/10/21 at 9:16 AM EDT

## 2021-09-10 NOTE — PROGRESS NOTES
Verbal order per Li Reilly CNP for urine drug screen. Positive for BUP. Verified results with Everett Jorge.

## 2021-09-12 ENCOUNTER — APPOINTMENT (OUTPATIENT)
Dept: GENERAL RADIOLOGY | Age: 31
End: 2021-09-12
Payer: MEDICAID

## 2021-09-12 ENCOUNTER — APPOINTMENT (OUTPATIENT)
Dept: CT IMAGING | Age: 31
End: 2021-09-12
Payer: MEDICAID

## 2021-09-12 ENCOUNTER — HOSPITAL ENCOUNTER (EMERGENCY)
Age: 31
Discharge: HOME | End: 2021-09-13
Attending: EMERGENCY MEDICINE
Payer: MEDICAID

## 2021-09-12 VITALS
WEIGHT: 250 LBS | RESPIRATION RATE: 16 BRPM | HEIGHT: 69 IN | HEART RATE: 80 BPM | TEMPERATURE: 98.2 F | OXYGEN SATURATION: 97 % | DIASTOLIC BLOOD PRESSURE: 86 MMHG | BODY MASS INDEX: 37.03 KG/M2 | SYSTOLIC BLOOD PRESSURE: 130 MMHG

## 2021-09-12 DIAGNOSIS — R10.84 GENERALIZED ABDOMINAL PAIN: Primary | ICD-10-CM

## 2021-09-12 LAB
ALBUMIN SERPL-MCNC: 4.1 G/DL (ref 3.5–5.1)
ALP BLD-CCNC: 60 U/L (ref 38–126)
ALT SERPL-CCNC: 7 U/L (ref 11–66)
ANION GAP SERPL CALCULATED.3IONS-SCNC: 10 MEQ/L (ref 8–16)
AST SERPL-CCNC: 14 U/L (ref 5–40)
ATYPICAL LYMPHOCYTES: ABNORMAL %
BASOPHILS # BLD: 0.5 %
BASOPHILS ABSOLUTE: 0 THOU/MM3 (ref 0–0.1)
BILIRUB SERPL-MCNC: < 0.2 MG/DL (ref 0.3–1.2)
BILIRUBIN DIRECT: < 0.2 MG/DL (ref 0–0.3)
BUN BLDV-MCNC: 11 MG/DL (ref 7–22)
CALCIUM SERPL-MCNC: 9 MG/DL (ref 8.5–10.5)
CHLORIDE BLD-SCNC: 102 MEQ/L (ref 98–111)
CO2: 25 MEQ/L (ref 23–33)
CREAT SERPL-MCNC: 0.8 MG/DL (ref 0.4–1.2)
EOSINOPHIL # BLD: 2.1 %
EOSINOPHILS ABSOLUTE: 0.1 THOU/MM3 (ref 0–0.4)
ERYTHROCYTE [DISTWIDTH] IN BLOOD BY AUTOMATED COUNT: 13 % (ref 11.5–14.5)
ERYTHROCYTE [DISTWIDTH] IN BLOOD BY AUTOMATED COUNT: 43.8 FL (ref 35–45)
GFR SERPL CREATININE-BSD FRML MDRD: > 90 ML/MIN/1.73M2
GLUCOSE BLD-MCNC: 108 MG/DL (ref 70–108)
HCT VFR BLD CALC: 42.4 % (ref 42–52)
HEMOCCULT STL QL: NEGATIVE
HEMOGLOBIN: 14.1 GM/DL (ref 14–18)
IMMATURE GRANS (ABS): 0.01 THOU/MM3 (ref 0–0.07)
IMMATURE GRANULOCYTES: 0.2 %
LYMPHOCYTES # BLD: 49.4 %
LYMPHOCYTES ABSOLUTE: 3.3 THOU/MM3 (ref 1–4.8)
MCH RBC QN AUTO: 30.7 PG (ref 26–33)
MCHC RBC AUTO-ENTMCNC: 33.3 GM/DL (ref 32.2–35.5)
MCV RBC AUTO: 92.2 FL (ref 80–94)
MONOCYTES # BLD: 9.4 %
MONOCYTES ABSOLUTE: 0.6 THOU/MM3 (ref 0.4–1.3)
NUCLEATED RED BLOOD CELLS: 0 /100 WBC
OSMOLALITY CALCULATION: 273.7 MOSMOL/KG (ref 275–300)
PLATELET # BLD: 306 THOU/MM3 (ref 130–400)
PLATELET ESTIMATE: ADEQUATE
PMV BLD AUTO: 9.4 FL (ref 9.4–12.4)
POTASSIUM REFLEX MAGNESIUM: 3.9 MEQ/L (ref 3.5–5.2)
RBC # BLD: 4.6 MILL/MM3 (ref 4.7–6.1)
SARS-COV-2, NAAT: NOT DETECTED
SCAN OF BLOOD SMEAR: NORMAL
SEG NEUTROPHILS: 38.4 %
SEGMENTED NEUTROPHILS ABSOLUTE COUNT: 2.5 THOU/MM3 (ref 1.8–7.7)
SODIUM BLD-SCNC: 137 MEQ/L (ref 135–145)
TOTAL PROTEIN: 7 G/DL (ref 6.1–8)
TROPONIN T: < 0.01 NG/ML
WBC # BLD: 6.6 THOU/MM3 (ref 4.8–10.8)

## 2021-09-12 PROCEDURE — 80076 HEPATIC FUNCTION PANEL: CPT

## 2021-09-12 PROCEDURE — 93005 ELECTROCARDIOGRAM TRACING: CPT | Performed by: EMERGENCY MEDICINE

## 2021-09-12 PROCEDURE — 99285 EMERGENCY DEPT VISIT HI MDM: CPT

## 2021-09-12 PROCEDURE — 36415 COLL VENOUS BLD VENIPUNCTURE: CPT

## 2021-09-12 PROCEDURE — 82272 OCCULT BLD FECES 1-3 TESTS: CPT

## 2021-09-12 PROCEDURE — 85025 COMPLETE CBC W/AUTO DIFF WBC: CPT

## 2021-09-12 PROCEDURE — 6360000002 HC RX W HCPCS

## 2021-09-12 PROCEDURE — 80048 BASIC METABOLIC PNL TOTAL CA: CPT

## 2021-09-12 PROCEDURE — 2580000003 HC RX 258: Performed by: EMERGENCY MEDICINE

## 2021-09-12 PROCEDURE — 84484 ASSAY OF TROPONIN QUANT: CPT

## 2021-09-12 PROCEDURE — 87635 SARS-COV-2 COVID-19 AMP PRB: CPT

## 2021-09-12 PROCEDURE — 6360000002 HC RX W HCPCS: Performed by: EMERGENCY MEDICINE

## 2021-09-12 PROCEDURE — 96374 THER/PROPH/DIAG INJ IV PUSH: CPT

## 2021-09-12 PROCEDURE — 74176 CT ABD & PELVIS W/O CONTRAST: CPT

## 2021-09-12 PROCEDURE — 71045 X-RAY EXAM CHEST 1 VIEW: CPT

## 2021-09-12 PROCEDURE — 96372 THER/PROPH/DIAG INJ SC/IM: CPT

## 2021-09-12 PROCEDURE — 81003 URINALYSIS AUTO W/O SCOPE: CPT

## 2021-09-12 RX ORDER — DIPHENHYDRAMINE HYDROCHLORIDE 50 MG/ML
INJECTION INTRAMUSCULAR; INTRAVENOUS
Status: COMPLETED
Start: 2021-09-12 | End: 2021-09-12

## 2021-09-12 RX ORDER — PROMETHAZINE HYDROCHLORIDE 25 MG/ML
12.5 INJECTION, SOLUTION INTRAMUSCULAR; INTRAVENOUS ONCE
Status: COMPLETED | OUTPATIENT
Start: 2021-09-12 | End: 2021-09-12

## 2021-09-12 RX ORDER — 0.9 % SODIUM CHLORIDE 0.9 %
1000 INTRAVENOUS SOLUTION INTRAVENOUS ONCE
Status: COMPLETED | OUTPATIENT
Start: 2021-09-12 | End: 2021-09-13

## 2021-09-12 RX ORDER — DIPHENHYDRAMINE HYDROCHLORIDE 50 MG/ML
25 INJECTION INTRAMUSCULAR; INTRAVENOUS ONCE
Status: COMPLETED | OUTPATIENT
Start: 2021-09-12 | End: 2021-09-12

## 2021-09-12 RX ORDER — ONDANSETRON 2 MG/ML
4 INJECTION INTRAMUSCULAR; INTRAVENOUS ONCE
Status: DISCONTINUED | OUTPATIENT
Start: 2021-09-12 | End: 2021-09-13 | Stop reason: HOSPADM

## 2021-09-12 RX ADMIN — PROMETHAZINE HYDROCHLORIDE 12.5 MG: 25 INJECTION INTRAMUSCULAR; INTRAVENOUS at 22:37

## 2021-09-12 RX ADMIN — DIPHENHYDRAMINE HYDROCHLORIDE 25 MG: 50 INJECTION, SOLUTION INTRAMUSCULAR; INTRAVENOUS at 22:54

## 2021-09-12 RX ADMIN — SODIUM CHLORIDE 1000 ML: 9 INJECTION, SOLUTION INTRAVENOUS at 22:14

## 2021-09-12 RX ADMIN — DIPHENHYDRAMINE HYDROCHLORIDE 25 MG: 50 INJECTION INTRAMUSCULAR; INTRAVENOUS at 22:54

## 2021-09-12 ASSESSMENT — PAIN DESCRIPTION - PAIN TYPE: TYPE: ACUTE PAIN

## 2021-09-12 ASSESSMENT — PAIN SCALES - GENERAL: PAINLEVEL_OUTOF10: 5

## 2021-09-12 ASSESSMENT — PAIN DESCRIPTION - FREQUENCY: FREQUENCY: INTERMITTENT

## 2021-09-12 ASSESSMENT — PAIN DESCRIPTION - DESCRIPTORS: DESCRIPTORS: ACHING

## 2021-09-12 ASSESSMENT — PAIN DESCRIPTION - LOCATION: LOCATION: ABDOMEN

## 2021-09-13 ENCOUNTER — TELEPHONE (OUTPATIENT)
Dept: INTERNAL MEDICINE CLINIC | Age: 31
End: 2021-09-13

## 2021-09-13 ENCOUNTER — CARE COORDINATION (OUTPATIENT)
Dept: CARE COORDINATION | Age: 31
End: 2021-09-13

## 2021-09-13 LAB
BILIRUBIN URINE: NEGATIVE
BLOOD, URINE: NEGATIVE
CHARACTER, URINE: CLEAR
COLOR: YELLOW
EKG ATRIAL RATE: 83 BPM
EKG P AXIS: 47 DEGREES
EKG P-R INTERVAL: 150 MS
EKG Q-T INTERVAL: 348 MS
EKG QRS DURATION: 84 MS
EKG QTC CALCULATION (BAZETT): 408 MS
EKG R AXIS: 63 DEGREES
EKG T AXIS: 52 DEGREES
EKG VENTRICULAR RATE: 83 BPM
GLUCOSE URINE: NEGATIVE MG/DL
KETONES, URINE: NEGATIVE
LEUKOCYTE ESTERASE, URINE: NEGATIVE
NITRITE, URINE: NEGATIVE
PH UA: 5.5 (ref 5–9)
PROTEIN UA: NEGATIVE
SPECIFIC GRAVITY, URINE: < 1.005 (ref 1–1.03)
UROBILINOGEN, URINE: 0.2 EU/DL (ref 0–1)

## 2021-09-13 NOTE — ED NOTES
Pt to CT at this time.        Abbi, [facility]  09/12/21 [id number]
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
## 2021-09-13 NOTE — ED NOTES
Patient resting in supine position, breathing easy and unlabored. Patient informed of needing a urine and stool specimen.       Ash Morales RN  09/12/21 [id number]

## 2021-09-13 NOTE — ED NOTES
Pt to ED from [facility] for complaint of 5/10 intermittent abdominal pain with nausea and emesis today. Pt states he noticed blood in emesis, states one bloody stool today. Pt states no further symptoms at this time. Audible expiratory wheezes heard upon assessing pt. Pt states smoking 1/2 pack cigarettes per day. Pt signs out AMA from [facility] prior to arrival via EMS. Pt states he was at [facility] for depression, denies suicidal/homicidal ideation at this time. No distress noted, pt breaths easy and unlabored.        Abbi [facility]  09/12/21 2126

## 2021-09-13 NOTE — ED PROVIDER NOTES
[facility] EMERGENCY DEPT      CHIEF COMPLAINT       Chief Complaint   Patient presents with    Chest Pain    Rectal Bleeding       Nurses Notes reviewed and I agree except as noted in the HPI. HISTORY OF PRESENT ILLNESS    Any Degroot is a 27 y.o. male who presents with complaint of chest pain and rectal bleed  Patient states that he has been having rectal bleeding since early this morning. Patient reporting anterior abdominal pain, left chest wall pain. Nothing seems to make the pain better or worse. He's had no fevers or chills, no cough, no shortness of breath. Patient said that he has had Covid vaccination. Onset: Acute  Duration: Less than 24 hours  Timing: Persistent  Location of Pain: Abdominal pain, left chest pain  Intesity/severity: Mild  Modifying Factors: Unknown  Relieved by;  Previous Episodes; Tx Before arrival: None  REVIEW OF SYSTEMS      Review of Systems   Constitutional: Negative for fever, chills, diaphoresis and fatigue. HENT: Negative for congestion, drooling, facial swelling and sore throat. Eyes: Negative for photophobia, pain and discharge. Respiratory: Negative for cough, shortness of breath, wheezing and stridor. Cardiovascular: Positive for chest pain; negative for palpitations and leg swelling. Gastrointestinal: Positive for abdominal pain Positive for blood in stool. Endocrine: Negative for cold intolerance, heat intolerance, polydipsia and polyuria. Genitourinary: Negative for dysuria, urgency, hematuria and difficulty urinating. Musculoskeletal: Negative for gait problem, neck pain and neck stiffness. Skin; chest wall rash with itching  Neurological: Negative for seizures, weakness and numbness. Psychiatric/Behavioral: Negative for hallucinations, confusion and agitation.      PAST MEDICAL HISTORY    has a past medical history of Anxiety, Arthritis, Asthma, Bipolar I disorder, most recent episode (or current) depressed, unspecified, Clostridium difficile infection, COPD (chronic obstructive pulmonary disease) ([facility] Depression, Disease of blood and blood forming organ, Eczema, Fracture, metacarpal, Gastric ulcer, Gastritis, GERD (gastroesophageal reflux disease), GI bleed, H. pylori infection, H/O blood clots, Head injury, Headache, Insomnia, Juvenile rheumatoid arthritis ([facility] Neuromuscular disorder (HCC), PFAPA syndrome ([facility] PUD (peptic ulcer disease), Rheumatoid arthritis ([facility] Rheumatoid arthritis(714.0), Sleep apnea, 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[facility] Substance abuse ([facility] Suicidal ideation, Suicide attempt by hanging ([facility] Tobacco dependence, Ulcerative colitis ([facility] and UTI (urinary tract infection). SURGICAL HISTORY      has a past surgical history that includes Colonoscopy; bronchoscopy; other surgical history; Upper gastrointestinal endoscopy (2/4/16); pr egd transoral biopsy single/multiple (N/A, 3/20/2017); sigmoidoscopy (N/A, 3/20/2017); Cholecystectomy, laparoscopic (07/14/2017); pr esophagogastroduodenoscopy transoral diagnostic (N/A, 8/9/2017); pr colonoscopy w/biopsy single/multiple (8/9/2017); Abdomen surgery; Upper gastrointestinal endoscopy (N/A, 6/13/2018); Upper gastrointestinal endoscopy (N/A, 9/20/2018); and Endoscopy, colon, diagnostic. CURRENT MEDICATIONS       Discharge Medication List as of 9/12/2021 11:49 PM      CONTINUE these medications which have NOT CHANGED    Details   buprenorphine-naloxone (SUBOXONE) 8-2 MG FILM SL film Place 1 Film under the tongue 2 times daily for 14 days. , Disp-28 Film, R-0Normal      cephALEXin (KEFLEX) 500 MG capsule Take 1 capsule by mouth 4 times daily for 7 days, Disp-28 capsule, R-0Normal      promethazine (PHENERGAN) 25 MG tablet Take 1 tablet by mouth 3 times daily as needed for Nausea, Disp-30 tablet, R-0Normal      tiZANidine (ZANAFLEX) 4 MG tablet Take 1 tablet by mouth 3 times daily as needed (SCIATICA), Disp-30 tablet, R-0Normal      gabapentin (NEURONTIN) 800 MG tablet Take 1 tablet by mouth 3 times daily for 30 days. , Disp-90 tablet, R-0Normal      ondansetron (ZOFRAN ODT) 4 MG disintegrating tablet Take 1 tablet by mouth every 8 hours as needed for Nausea, Disp-10 tablet, R-0Print      sucralfate (CARAFATE) 1 GM tablet Take 1 tablet by mouth 4 times daily, Disp-60 tablet, R-0Print      diphenhydrAMINE (BENADRYL) 25 MG tablet Take 25 mg by mouth 2 times daily Historical Med      cloNIDine (CATAPRES) 0.1 MG tablet Take 0.1 mg by mouth 3 times dailyHistorical Med      hydrOXYzine (ATARAX) 50 MG tablet Take 50 mg by mouth 2 times dailyHistorical Med      meloxicam (MOBIC) 7.5 MG tablet Take 1 tablet by mouth 2 times daily as needed for Pain, Disp-60 tablet, R-0Normal      pantoprazole (PROTONIX) 40 MG tablet Take 1 tablet by mouth every morning (before breakfast), Disp-30 tablet, R-0Normal      docusate sodium (COLACE) 100 MG capsule Take 1 capsule by mouth 2 times daily, Disp-60 capsule, R-0Normal      QUEtiapine (SEROQUEL) 100 MG tablet Take 1 tablet by mouth nightly, Disp-60 tablet, R-0Normal      Suvorexant (BELSOMRA) 20 MG TABS Take 1 tablet by mouth daily for 30 days. , Disp-30 tablet, R-0Normal      ARIPiprazole (ABILIFY) 15 MG tablet Take 1 tablet by mouth daily, Disp-30 tablet, R-3Normal      metFORMIN (GLUCOPHAGE) 500 MG tablet Take 1 tablet by mouth 2 times daily (with meals), Disp-60 tablet, R-0Normal      Multiple Vitamins-Minerals (THERAPEUTIC MULTIVITAMIN-MINERALS) tablet Take 1 tablet by mouth daily, Disp-30 tablet, R-0Normal      traZODone (DESYREL) 150 MG tablet Take 1 tablet by mouth nightly, Disp-30 tablet, R-0Normal             ALLERGIES     is allergic to dicyclomine, famotidine, geodon [ziprasidone hcl], haloperidol, iv dye [iodides], ibuprofen, aspirin, dicyclomine hcl, flagyl [metronidazole], iodine, and mirtazapine. FAMILY HISTORY     He indicated that his mother is alive. He indicated that his father is alive. He indicated that the status of his sister is unknown.  He indicated that the status of his other is unknown. He indicated that the status of his paternal cousin is unknown.   family history includes Alcohol Abuse in his father; Arthritis in his father and mother; Colon Cancer (age of onset: 43) in his paternal cousin; Depression in his brother and father; Diabetes in his brother and father; High Blood Pressure in his father; Mental Illness in his brother; Migraines in his brother, father, mother, and sister; Other in his brother, brother, father, mother, and sister; Stroke in an other family member. SOCIAL HISTORY      reports that he has been smoking cigarettes. He has a 7.50 pack-year smoking history. He has never used smokeless tobacco. He reports previous alcohol use. He reports previous drug use. Drug: Opiates . PHYSICAL EXAM     INITIAL VITALS:  height is 5' 9\" (1.753 m) and weight is 250 lb (113.4 kg). His oral temperature is 98.2 °F (36.8 °C). His blood pressure is 130/86 and his pulse is 80. His respiration is 16 and oxygen saturation is 97%. Physical Exam   Constitutional:  well-developed and well-nourished. HENT: Head: Normocephalic, atraumatic, Bilateral external ears normal, Oropharynx mosit, No oral exudates, Nose normal.   Eyes: PERRL, EOMI, Conjunctiva normal, No discharge. No scleral icterus  Neck: Normal range of motion, No tenderness, Supple  Cardiovascular: Normal rate, regular rhythm, S1 normal and S2 normal.  Exam reveals no gallop. Pulmonary/Chest: Effort normal and breath sounds normal. No accessory muscle usage or stridor. No respiratory distress. no wheezes. has no rales. exhibits no tenderness. Abdominal: Soft. Bowel sounds are normal.  exhibits no distension. There is no tenderness. There is no rebound and no guarding. Extremities: No edema, no tenderness, no cyanosis, no clubbing. Musculoskeletal: Good range of motion in major joints is observed. No major deformities noted.   Neurological: Alert and oriented ×3, normal motor function, normal sensory function, no focal deficits. GCS 15  Skin: Skin is warm, dry and intact. Maculopapular rash anterior chest wall. Psychiatric: Affect normal, judgment normal, mood normal.  DIFFERENTIAL DIAGNOSIS:       DIAGNOSTIC RESULTS     EKG: All EKG's are interpreted by the Emergency Department Physician who either signs or Co-signs this chart in the absence of a cardiologist.      RADIOLOGY: non-plain film images(s) such as CT, Ultrasound and MRI are read by the radiologist.  Plain radiographic images are visualized and preliminarily interpreted by the emergency physician unless otherwise stated below. LABS:   Labs Reviewed   CBC WITH AUTO DIFFERENTIAL - Abnormal; Notable for the following components:       Result Value    RBC 4.60 (*)     All other components within normal limits   HEPATIC FUNCTION PANEL - Abnormal; Notable for the following components: Total Bilirubin <0.2 (*)     ALT 7 (*)     All other components within normal limits   OSMOLALITY - Abnormal; Notable for the following components:    Osmolality Calc 273.7 (*)     All other components within normal limits   COVID-19, RAPID   BASIC METABOLIC PANEL W/ REFLEX TO MG FOR LOW K   TROPONIN   URINE RT REFLEX TO CULTURE   BLOOD OCCULT STOOL SCREEN #1   ANION GAP   GLOMERULAR FILTRATION RATE, ESTIMATED   SCAN OF BLOOD SMEAR       EMERGENCY DEPARTMENT COURSE:   Vitals:    Vitals:    09/12/21 2107 09/12/21 2243   BP: 129/84 130/86   Pulse: 86 80   Resp: 16 16   Temp: 98.2 °F (36.8 °C)    TempSrc: Oral    SpO2: 97% 97%   Weight: 250 lb (113.4 kg)    Height: 5' 9\" (1.753 m)      Patient presenting with constellation of complaints, abdominal pain, bloody stools, itchy skin, chest pain. Patient rectal exam was benign. Patient has significant eczema, concerning the itchy skin. Abdominal sounds and exam was benign. So far patient stool guaiac is negative. CT abdomen negative.   Patient asking for exclusively IV Phenergan and Benadryl. Patient has oral Phenergan at home/Benadryl, his been advised to use as directed by his primary care physician. CRITICAL CARE:     CONSULTS:  None    PROCEDURES:  None    FINAL IMPRESSION      1.  Generalized abdominal pain          DISPOSITION/PLAN   Decision To Discharge    PATIENT REFERRED TO:  Jailene Mendoza, MASSIEL - CNP  [address] [facility] [address]  [phone]    Schedule an appointment as soon as possible for a visit in 1 day  RE-CHECK AND FURTHER TESTING AS NEEDED      DISCHARGE MEDICATIONS:  Discharge Medication List as of 9/12/2021 11:49 PM          (Please note that portions of this note were completed with a voice recognition program.  Efforts were made to edit the dictations but occasionally words are mis-transcribed.)    Adolfo Barr, [address] [place] [address],   09/13/21 0144

## 2021-09-19 ENCOUNTER — HOSPITAL ENCOUNTER (EMERGENCY)
Age: 31
Discharge: HOME | End: 2021-09-19
Attending: EMERGENCY MEDICINE
Payer: MEDICARE

## 2021-09-19 VITALS
RESPIRATION RATE: 18 BRPM | OXYGEN SATURATION: 94 % | TEMPERATURE: 98.6 F | DIASTOLIC BLOOD PRESSURE: 79 MMHG | HEIGHT: 69 IN | WEIGHT: 220 LBS | BODY MASS INDEX: 32.58 KG/M2 | SYSTOLIC BLOOD PRESSURE: 151 MMHG | HEART RATE: 82 BPM

## 2021-09-19 DIAGNOSIS — K62.5 RECTAL BLEEDING: Primary | ICD-10-CM

## 2021-09-19 LAB
ABSOLUTE EOS #: 0.11 K/UL (ref 0–0.44)
ABSOLUTE IMMATURE GRANULOCYTE: <0.03 K/UL (ref 0–0.3)
ABSOLUTE LYMPH #: 2.2 K/UL (ref 1.1–3.7)
ABSOLUTE MONO #: 0.5 K/UL (ref 0.1–1.2)
ALBUMIN SERPL-MCNC: 4 G/DL (ref 3.5–5.2)
ALBUMIN/GLOBULIN RATIO: 1.4 (ref 1–2.5)
ALP BLD-CCNC: 61 U/L (ref 40–129)
ALT SERPL-CCNC: 7 U/L (ref 5–41)
ANION GAP SERPL CALCULATED.3IONS-SCNC: 9 MMOL/L (ref 9–17)
AST SERPL-CCNC: 17 U/L
BASOPHILS # BLD: 1 % (ref 0–2)
BASOPHILS ABSOLUTE: 0.03 K/UL (ref 0–0.2)
BILIRUB SERPL-MCNC: 0.28 MG/DL (ref 0.3–1.2)
BUN BLDV-MCNC: 7 MG/DL (ref 6–20)
BUN/CREAT BLD: ABNORMAL (ref 9–20)
CALCIUM SERPL-MCNC: 8.6 MG/DL (ref 8.6–10.4)
CHLORIDE BLD-SCNC: 101 MMOL/L (ref 98–107)
CO2: 22 MMOL/L (ref 20–31)
CREAT SERPL-MCNC: 0.75 MG/DL (ref 0.7–1.2)
DIFFERENTIAL TYPE: NORMAL
EOSINOPHILS RELATIVE PERCENT: 2 % (ref 1–4)
GFR AFRICAN AMERICAN: >60 ML/MIN
GFR NON-AFRICAN AMERICAN: >60 ML/MIN
GFR SERPL CREATININE-BSD FRML MDRD: ABNORMAL ML/MIN/{1.73_M2}
GFR SERPL CREATININE-BSD FRML MDRD: ABNORMAL ML/MIN/{1.73_M2}
GLUCOSE BLD-MCNC: 87 MG/DL (ref 70–99)
HCT VFR BLD CALC: 41.3 % (ref 40.7–50.3)
HEMOGLOBIN: 13.3 G/DL (ref 13–17)
IMMATURE GRANULOCYTES: 0 %
LIPASE: 17 U/L (ref 13–60)
LYMPHOCYTES # BLD: 34 % (ref 24–43)
MCH RBC QN AUTO: 29.4 PG (ref 25.2–33.5)
MCHC RBC AUTO-ENTMCNC: 32.2 G/DL (ref 28.4–34.8)
MCV RBC AUTO: 91.2 FL (ref 82.6–102.9)
MONOCYTES # BLD: 8 % (ref 3–12)
NRBC AUTOMATED: 0 PER 100 WBC
PDW BLD-RTO: 13.3 % (ref 11.8–14.4)
PLATELET # BLD: 279 K/UL (ref 138–453)
PLATELET ESTIMATE: NORMAL
PMV BLD AUTO: 9.6 FL (ref 8.1–13.5)
POTASSIUM SERPL-SCNC: 4 MMOL/L (ref 3.7–5.3)
RBC # BLD: 4.53 M/UL (ref 4.21–5.77)
RBC # BLD: NORMAL 10*6/UL
SEG NEUTROPHILS: 55 % (ref 36–65)
SEGMENTED NEUTROPHILS ABSOLUTE COUNT: 3.55 K/UL (ref 1.5–8.1)
SODIUM BLD-SCNC: 132 MMOL/L (ref 135–144)
TOTAL PROTEIN: 6.9 G/DL (ref 6.4–8.3)
WBC # BLD: 6.4 K/UL (ref 3.5–11.3)
WBC # BLD: NORMAL 10*3/UL

## 2021-09-19 PROCEDURE — 6370000000 HC RX 637 (ALT 250 FOR IP): Performed by: EMERGENCY MEDICINE

## 2021-09-19 PROCEDURE — 96372 THER/PROPH/DIAG INJ SC/IM: CPT

## 2021-09-19 PROCEDURE — 99283 EMERGENCY DEPT VISIT LOW MDM: CPT

## 2021-09-19 PROCEDURE — 80053 COMPREHEN METABOLIC PANEL: CPT

## 2021-09-19 PROCEDURE — 83690 ASSAY OF LIPASE: CPT

## 2021-09-19 PROCEDURE — 6360000002 HC RX W HCPCS: Performed by: STUDENT IN AN ORGANIZED HEALTH CARE EDUCATION/TRAINING PROGRAM

## 2021-09-19 PROCEDURE — 85025 COMPLETE CBC W/AUTO DIFF WBC: CPT

## 2021-09-19 RX ORDER — DIPHENHYDRAMINE HCL 25 MG
25 TABLET ORAL ONCE
Status: COMPLETED | OUTPATIENT
Start: 2021-09-19 | End: 2021-09-19

## 2021-09-19 RX ORDER — PROMETHAZINE HYDROCHLORIDE 25 MG/ML
6.25 INJECTION, SOLUTION INTRAMUSCULAR; INTRAVENOUS ONCE
Status: COMPLETED | OUTPATIENT
Start: 2021-09-19 | End: 2021-09-19

## 2021-09-19 RX ORDER — CAMPHOR 0.45 %
1 GEL (GRAM) TOPICAL 2 TIMES DAILY
Qty: 1 EACH | Refills: 0 | Status: ON HOLD | OUTPATIENT
Start: 2021-09-19 | End: 2021-11-01 | Stop reason: HOSPADM

## 2021-09-19 RX ADMIN — PROMETHAZINE HYDROCHLORIDE 6.25 MG: 25 INJECTION INTRAMUSCULAR; INTRAVENOUS at 19:44

## 2021-09-19 RX ADMIN — DIPHENHYDRAMINE HCL 25 MG: 25 TABLET ORAL at 20:11

## 2021-09-19 ASSESSMENT — PAIN SCALES - GENERAL: PAINLEVEL_OUTOF10: 10

## 2021-09-19 ASSESSMENT — ENCOUNTER SYMPTOMS
PHOTOPHOBIA: 0
BLOOD IN STOOL: 1
ABDOMINAL PAIN: 1
VOMITING: 1
SHORTNESS OF BREATH: 0
NAUSEA: 1

## 2021-09-19 ASSESSMENT — PAIN DESCRIPTION - LOCATION: LOCATION: ABDOMEN

## 2021-09-19 ASSESSMENT — PAIN DESCRIPTION - DESCRIPTORS: DESCRIPTORS: SHARP

## 2021-09-19 NOTE — ED PROVIDER NOTES
[address] ED  Emergency Department Encounter  EmergencyMedicine Resident     Pt Vince Balbuena  MRN: [id number]  Darcigfmohinder 1990  Date of evaluation: 9/19/21  PCP:  MASSIEL Bautista CNP    This patient was evaluated in the Emergency Department for symptoms described in the history of present illness. The patient was evaluated in the context of the global COVID-19 pandemic, which necessitated consideration that the patient might be at risk for infection with the SARS-CoV-2 virus that causes COVID-19. Institutional protocols and algorithms that pertain to the evaluation of patients at risk for COVID-19 are in a state of rapid change based on information released by regulatory bodies including the CDC and federal and state organizations. These policies and algorithms were followed during the patient's care in the ED. CHIEF COMPLAINT       Chief Complaint   Patient presents with    Hematemesis    Rectal Bleeding       HISTORY OF PRESENT ILLNESS  (Location/Symptom, Timing/Onset, Context/Setting, Quality, Duration, Modifying Factors, Severity.)      Martita Marte is a 27 y.o. male who presents with complaint of bloody stool review of records of the patient was seen yesterday at [facility] emergency department with a complaint of vomiting blood as well as rectal bleeding. He has a follow-up with gastroenterology patient symptoms started approximately 3 days ago patient's evaluation yesterday at [facility] was unremarkable. Patient presents today with request for a colonoscopy in the emergency department or to be admitted for colonoscopy. States that he has not made an appointment with his outpatient gastroenterologist and would like it simply to be done now.      PAST MEDICAL / SURGICAL / SOCIAL / FAMILY HISTORY      has a past medical history of Anxiety, Arthritis, Asthma, Bipolar I disorder, most recent episode (or current) depressed, unspecified, Clostridium difficile infection, COPD (chronic obstructive pulmonary disease) ([facility] [address] Depression, Disease of blood and blood forming organ, Eczema, Fracture, metacarpal, Gastric ulcer, Gastritis, GERD (gastroesophageal reflux disease), GI bleed, H. pylori infection, H/O blood clots, Head injury, Headache, Insomnia, Juvenile rheumatoid arthritis ([facility] [address] Neuromuscular disorder (HCC), PFAPA syndrome ([facility] [address] [facility] PUD (peptic ulcer disease), Rheumatoid arthritis ([facility] [address] [facility] Rheumatoid arthritis(714.0), Sleep apnea, 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[facility] [address] Suicidal ideation, Suicide attempt by hanging ([facility] Tobacco dependence, Ulcerative colitis ([facility] [address] [facility] and UTI (urinary tract infection). has a past surgical history that includes Colonoscopy; bronchoscopy; other surgical history; Upper gastrointestinal endoscopy (2/4/16); pr egd transoral biopsy single/multiple (N/A, 3/20/2017); sigmoidoscopy (N/A, 3/20/2017); Cholecystectomy, laparoscopic (07/14/2017); pr esophagogastroduodenoscopy transoral diagnostic (N/A, 8/9/2017); pr colonoscopy w/biopsy single/multiple (8/9/2017); Abdomen surgery; Upper gastrointestinal endoscopy (N/A, 6/13/2018); Upper gastrointestinal endoscopy (N/A, 9/20/2018); and Endoscopy, colon, diagnostic.       Social History     Socioeconomic History    Marital status: Single     Spouse name: Not on file    Number of children: Not on file    Years of education: Not on file    Highest education level: Not on file   Occupational History    Occupation: disability   Tobacco Use    Smoking status: Current Every Day Smoker     Packs/day: 0.50     Years: 15.00     Pack years: 7.50     Types: Cigarettes    Smokeless tobacco: Never Used   Vaping Use    Vaping Use: Former   Substance and Sexual Activity    Alcohol use: Not Currently     Comment: social ETOH use    Drug use: Not Currently     Types: Opiates      Comment: last used pain pill/fentanyl 2/18/21    Sexual activity: Yes     Partners: Female     Comment: Lives alone, not working. Other Topics Concern    Not on file   Social History Narrative    ** Merged History Encounter **          Social Determinants of Health     Financial Resource Strain: Unknown    Difficulty of Paying Living Expenses: Patient refused   Food Insecurity: Unknown    Worried About Running Out of Food in the Last Year: Patient refused    [address] in the Last Year: Patient refused   Transportation Needs:     Lack of Transportation (Medical):      Lack of Transportation (Non-Medical):    Physical Activity:     Days of Exercise per Week:     Minutes of Exercise per Session:    Stress:     Feeling of Stress :    Social Connections:     Frequency of Communication with Friends and Family:     Frequency of Social Gatherings with Friends and Family:     Attends [employer] Services:     Active Member of Clubs or Organizations:     Attends Club or Organization Meetings:     Marital Status:    Intimate Partner Violence:     Fear of Current or Ex-Partner:     Emotionally Abused:     Physically Abused:     Sexually Abused:        Family History   Problem Relation Age of Onset    Diabetes Father     Alcohol Abuse Father     Depression Father     Arthritis Father     High Blood Pressure Father     Other Father         aneurysm & epilepsy    Migraines Father     Arthritis Mother     Other Mother         aneurysm & epilepsy    Migraines Mother     Diabetes Brother         Aunt and uncles    Depression Brother     Mental Illness Brother     Other Brother         epilepsy    Migraines Brother     Stroke Other         Uncle    Other Brother         murdered Oct 6th, 2014    Colon Cancer Paternal Cousin 43    Other Sister         epilepsy   Bertell Halim Migraines Sister        Allergies:  Dicyclomine, Famotidine, Geodon [ziprasidone hcl], Haloperidol, Iv dye [iodides], Ibuprofen, Aspirin, Dicyclomine hcl, Flagyl [metronidazole], Iodine, and Mirtazapine    Home Medications:  Prior to (DESYREL) 150 MG tablet Take 1 tablet by mouth nightly 7/26/21   Alyson Garcia MD       REVIEW OF SYSTEMS    (2-9 systems for level 4, 10 or more for level 5)      Review of Systems   Constitutional: Negative for chills and fever. HENT: Negative for congestion. Eyes: Negative for photophobia. Respiratory: Negative for shortness of breath. Cardiovascular: Negative for chest pain. Gastrointestinal: Positive for abdominal pain, blood in stool, nausea and vomiting. Genitourinary: Negative for dysuria. Musculoskeletal: Negative for gait problem. Skin: Positive for rash. Allergic/Immunologic: Positive for environmental allergies. Neurological: Negative for syncope, weakness and headaches. Hematological: Negative for adenopathy. Psychiatric/Behavioral: Negative for agitation. PHYSICAL EXAM   (up to 7 for level 4, 8 or more for level 5)      INITIAL VITALS:   BP (!) 151/79   Pulse 82   Temp 98.6 °F (37 °C) (Oral)   Resp 18   Ht 5' 9\" (1.753 m)   Wt 220 lb (99.8 kg)   SpO2 94%   BMI 32.49 kg/m²     Physical Exam  Vitals and nursing note reviewed. Constitutional:       General: He is not in acute distress. Appearance: He is not ill-appearing, toxic-appearing or diaphoretic. HENT:      Head: Normocephalic and atraumatic. Right Ear: External ear normal.      Left Ear: External ear normal.      Nose: Nose normal.      Mouth/Throat:      Pharynx: Oropharynx is clear. Eyes:      General: No scleral icterus. Conjunctiva/sclera: Conjunctivae normal.   Cardiovascular:      Rate and Rhythm: Normal rate. Pulses: Normal pulses. Pulmonary:      Effort: Pulmonary effort is normal.   Abdominal:      Palpations: Abdomen is soft. Tenderness: There is no right CVA tenderness, left CVA tenderness, guarding or rebound. Musculoskeletal:         General: Normal range of motion. Cervical back: Normal range of motion. Right lower leg: No edema.       Left lower leg: No edema. Skin:     Findings: Rash present. Comments: Anterior chest wall   Neurological:      Mental Status: He is alert and oriented to person, place, and time.          DIFFERENTIAL  DIAGNOSIS     PLAN (LABS / IMAGING / EKG):  Orders Placed This Encounter   Procedures    CBC WITH AUTO DIFFERENTIAL    Comprehensive Metabolic Panel    LIPASE       MEDICATIONS ORDERED:  Orders Placed This Encounter   Medications    promethazine (PHENERGAN) injection 6.25 mg    diphenhydrAMINE-Zinc Acetate (BENADRYL ITCH RELIEF) 2-0.1 % STCK     Sig: Apply 1 each topically 2 times daily     Dispense:  1 each     Refill:  0    diphenhydrAMINE (BENADRYL) tablet 25 mg       DDX: hemorrhoids, chronic abdominal pain, pancreatitis    DIAGNOSTIC RESULTS / EMERGENCY DEPARTMENT COURSE / MDM   LAB RESULTS:  Results for orders placed or performed during the hospital encounter of 09/19/21   CBC WITH AUTO DIFFERENTIAL   Result Value Ref Range    WBC 6.4 3.5 - 11.3 k/uL    RBC 4.53 4.21 - 5.77 m/uL    Hemoglobin 13.3 13.0 - 17.0 g/dL    Hematocrit 41.3 40.7 - 50.3 %    MCV 91.2 82.6 - 102.9 fL    MCH 29.4 25.2 - 33.5 pg    MCHC 32.2 28.4 - 34.8 g/dL    RDW 13.3 11.8 - 14.4 %    Platelets 630 947 - 386 k/uL    MPV 9.6 8.1 - 13.5 fL    NRBC Automated 0.0 0.0 per 100 WBC    Differential Type NOT REPORTED     Seg Neutrophils 55 36 - 65 %    Lymphocytes 34 24 - 43 %    Monocytes 8 3 - 12 %    Eosinophils % 2 1 - 4 %    Basophils 1 0 - 2 %    Immature Granulocytes 0 0 %    Segs Absolute 3.55 1.50 - 8.10 k/uL    Absolute Lymph # 2.20 1.10 - 3.70 k/uL    Absolute Mono # 0.50 0.10 - 1.20 k/uL    Absolute Eos # 0.11 0.00 - 0.44 k/uL    Basophils Absolute 0.03 0.00 - 0.20 k/uL    Absolute Immature Granulocyte <0.03 0.00 - 0.30 k/uL    WBC Morphology NOT REPORTED     RBC Morphology NOT REPORTED     Platelet Estimate NOT REPORTED    Comprehensive Metabolic Panel   Result Value Ref Range    Glucose 87 70 - 99 mg/dL    BUN 7 6 - 20 mg/dL CREATININE 0.75 0.70 - 1.20 mg/dL    Bun/Cre Ratio NOT REPORTED 9 - 20    Calcium 8.6 8.6 - 10.4 mg/dL    Sodium 132 (L) 135 - 144 mmol/L    Potassium 4.0 3.7 - 5.3 mmol/L    Chloride 101 98 - 107 mmol/L    CO2 22 20 - 31 mmol/L    Anion Gap 9 9 - 17 mmol/L    Alkaline Phosphatase 61 40 - 129 U/L    ALT 7 5 - 41 U/L    AST 17 <40 U/L    Total Bilirubin 0.28 (L) 0.3 - 1.2 mg/dL    Total Protein 6.9 6.4 - 8.3 g/dL    Albumin 4.0 3.5 - 5.2 g/dL    Albumin/Globulin Ratio 1.4 1.0 - 2.5    GFR Non-African American >60 >60 mL/min    GFR African American >60 >60 mL/min    GFR Comment          GFR Staging NOT REPORTED    LIPASE   Result Value Ref Range    Lipase 17 13 - 60 U/L       IMPRESSION: Patient is alert nontoxic well-appearing 70-year-old male in no acute distress heart is regular rate and rhythm abdomen soft nontender nondistended without any rebound or guarding present no signs equal lung sounds bilaterally rectal exam is negative for any blood. Patient does are unremarkable is not tachycardic or hypotensive. Plan will be CBC CMP lipase Phenergan for nausea anticipate discharge with gastroenterology follow-up. RADIOLOGY:  none    EKG  none    All EKG's are interpreted by the Emergency Department Physician who either signs or Co-signs this chart in the absence of a cardiologist.    EMERGENCY DEPARTMENT COURSE:  Seen evaluate history physical exam signs and symptoms suggest no etiologies requiring emergent intervention or admission to the hospital patient provided Phenergan with improvement in his nausea symptoms at time of discharge patient states that he has a rash on the anterior surface of his chest wall which is pruritic is rated taking hydroxyzine and Benadryl at home. Provided topical Benadryl cream and discharged home with GI follow-up      PROCEDURES:  none    CONSULTS:  None    CRITICAL CARE:  none    FINAL IMPRESSION      1.  Rectal bleeding          DISPOSITION / PLAN     DISPOSITION  dc      PATIENT REFERRED TO:  Jessica García, APRN - CNP  [address] [facility]  [facility]  [phone]    Call today  For follow-up and reevaluation in 1 to 2 days.     [facility]  [address] [facility] [address] [phone]  Go to   As needed, If symptoms worsen    [address] [address]  [phone]  Schedule an appointment as soon as possible for a visit         DISCHARGE MEDICATIONS:  Discharge Medication List as of 9/19/2021  7:57 PM          Kate Morales DO  Emergency Medicine Resident    (Please note that portions of thisnote were completed with a voice recognition program.  Efforts were made to edit the dictations but occasionally words are mis-transcribed.)        Kate Morales DO  Resident  09/19/21 2023

## 2021-09-19 NOTE — ED PROVIDER NOTES
[facility] ED     Emergency Department     Faculty Attestation    I performed a history and physical examination of the patient and discussed management with the resident. I reviewed the residents note and agree with the documented findings and plan of care. Any areas of disagreement are noted on the chart. I was personally present for the key portions of any procedures. I have documented in the chart those procedures where I was not present during the key portions. I have reviewed the emergency nurses triage note. I agree with the chief complaint, past medical history, past surgical history, allergies, medications, social and family history as documented unless otherwise noted below. For Physician Assistant/ Nurse Practitioner cases/documentation I have personally evaluated this patient and have completed at least one if not all key elements of the E/M (history, physical exam, and MDM). Additional findings are as noted. This patient was evaluated in the Emergency Department for symptoms described in the history of present illness. He/she was evaluated in the context of the global COVID-19 pandemic, which necessitated consideration that the patient might be at risk for infection with the SARS-CoV-2 virus that causes COVID-19. Institutional protocols and algorithms that pertain to the evaluation of patients at risk for COVID-19 are in a state of rapid change based on information released by regulatory bodies including the CDC and federal and state organizations. These policies and algorithms were followed during the patient's care in the ED. Patient with abdominal pain chronic issue for him also complains of rectal bleeding. No anticoagulation. Vomiting as well. No fevers. On exam nontoxic well-appearing chatting with the nurse. Abdomen soft no rebound no guarding no focal tenderness with distraction. Digital rectal exam guaiac negative per resident.   Will check labs, anticipate

## 2021-09-19 NOTE — ED NOTES
Pt provided warm blanket. Pt requesting to speak to doctor re: right arm swelling and rash on chest. Dr. Genaro Moeller notified.       Alisa Almonte RN  09/19/21 1940

## 2021-09-19 NOTE — ED NOTES
Pt to ed with c/o blood in stool and in emesis that has been ongoing for that past couple days. Pt states he has vomited 8 times the past couple days. Pt c/o sharp. 8/10 abd pain. Pt states he wanted to see a GI dr outpatient but didn't think he could wait that long. Pt states he had a fever of 103 this morning and took tylenol. Pt denies cp/sob. Pt a/ox4, ambulatory, RR even and non labored.       Master Martino RN  09/19/21 7619

## 2021-09-20 ENCOUNTER — TELEPHONE (OUTPATIENT)
Dept: INTERNAL MEDICINE CLINIC | Age: 31
End: 2021-09-20

## 2021-09-20 NOTE — TELEPHONE ENCOUNTER
Patient called stating he didn't think he could make it to his appointment on Friday because he didn't have a ride. Patient states he is currently in [place]. Don't think Stremor goes to [place]? Patient states his insurance doesn't offer rides either. Requesting refill on Clonidine 0.1 mg TID, Zanaflex 4 mg TID, Belsomra 20 mg nightly, gabapentin 800 mg TID, phenergan 25mg, and Suboxone. Informed patient he should have enough medication until Friday. Patient states he does have enough medication to last until then. I wasn't sure what to tell or offer him. I did confirm that he has a smart phone. Advised patient we could possibly do a video visit with him on Friday if you are okay with that.

## 2021-09-21 ENCOUNTER — CARE COORDINATION (OUTPATIENT)
Dept: CARE COORDINATION | Age: 31
End: 2021-09-21

## 2021-09-21 NOTE — CARE COORDINATION
Attempted to reach [place] today for f/u. Pt continues to have multiple visits to various Emergency departments. No answer. Message left to return call.

## 2021-09-24 ENCOUNTER — VIRTUAL VISIT (OUTPATIENT)
Dept: INTERNAL MEDICINE CLINIC | Age: 31
End: 2021-09-24
Payer: MEDICARE

## 2021-09-24 DIAGNOSIS — F11.20 SEVERE OPIOID USE DISORDER (HCC): ICD-10-CM

## 2021-09-24 DIAGNOSIS — G47.00 INSOMNIA, UNSPECIFIED TYPE: Primary | ICD-10-CM

## 2021-09-24 DIAGNOSIS — M79.7 FIBROMYALGIA: ICD-10-CM

## 2021-09-24 PROCEDURE — G8427 DOCREV CUR MEDS BY ELIG CLIN: HCPCS | Performed by: NURSE PRACTITIONER

## 2021-09-24 PROCEDURE — 99213 OFFICE O/P EST LOW 20 MIN: CPT | Performed by: NURSE PRACTITIONER

## 2021-09-24 PROCEDURE — 4004F PT TOBACCO SCREEN RCVD TLK: CPT | Performed by: NURSE PRACTITIONER

## 2021-09-24 PROCEDURE — G8417 CALC BMI ABV UP PARAM F/U: HCPCS | Performed by: NURSE PRACTITIONER

## 2021-09-24 RX ORDER — TIZANIDINE 4 MG/1
4 TABLET ORAL 3 TIMES DAILY PRN
Qty: 30 TABLET | Refills: 0 | Status: SHIPPED | OUTPATIENT
Start: 2021-09-24 | End: 2021-10-07 | Stop reason: SDUPTHER

## 2021-09-24 RX ORDER — SUVOREXANT 5 MG/1
5 TABLET, FILM COATED ORAL DAILY
Qty: 30 TABLET | Refills: 0 | Status: SHIPPED | OUTPATIENT
Start: 2021-09-24 | End: 2021-10-24

## 2021-09-24 RX ORDER — GABAPENTIN 800 MG/1
800 TABLET ORAL 3 TIMES DAILY
Qty: 90 TABLET | Refills: 0 | Status: ON HOLD | OUTPATIENT
Start: 2021-09-24 | End: 2021-11-01 | Stop reason: HOSPADM

## 2021-09-24 RX ORDER — PROMETHAZINE HYDROCHLORIDE 25 MG/1
25 TABLET ORAL 3 TIMES DAILY PRN
Qty: 30 TABLET | Refills: 0 | Status: SHIPPED | OUTPATIENT
Start: 2021-09-24 | End: 2021-10-04

## 2021-09-24 RX ORDER — BUPRENORPHINE AND NALOXONE 8; 2 MG/1; MG/1
1 FILM, SOLUBLE BUCCAL; SUBLINGUAL 2 TIMES DAILY
Qty: 28 FILM | Refills: 0 | Status: SHIPPED | OUTPATIENT
Start: 2021-09-24 | End: 2021-10-08

## 2021-09-24 NOTE — PROGRESS NOTES
Nathanael Sharp (:  1990) is a 27 y.o. male,Established patient, here for evaluation of the following chief complaint(s): Severe Opioid Use Disorder, insomnia, fibromyalgia       ASSESSMENT/PLAN:    1. Insomnia, unspecified type    -     Suvorexant (BELSOMRA) 5 MG TABS; Take 5 mg by mouth daily for 30 days. , Disp-30 tablet, R-0Normal  - Encouraged sleep hygiene measures    2. Severe opioid use disorder (HCC)    -     buprenorphine-naloxone (SUBOXONE) 8-2 MG FILM SL film; Place 1 Film under the tongue 2 times daily for 14 days. , Disp-28 Film, R-0Normal  - Narcan at home  - OARRS reviewed, no discrepancies  - Attend NA/AA meetings and/or arrange for individualized counseling   - Arrange visit with psychiatry ASAP    3. Fibromyalgia    -     gabapentin (NEURONTIN) 800 MG tablet; Take 1 tablet by mouth 3 times daily for 30 days. , Disp-90 tablet, R-0Normal      Return in about 13 days (around 10/7/2021) for at 1:00 pm for virtual visit. SUBJECTIVE/OBJECTIVE:    I last seen him 2 weeks ago    Patient location: Home  Provider location: Office  Others present/name/role: none    Denies any use    Urges and cravings controlled with Suboxone 8 mg BID    In [place]- went to - stranded in [facility] [address] [facility]- restriction for 3 weeks- cannot leave until that time is up. Fibromyalgia controlled with gabapentin. History of juvenile RA. Referred to rheumatology for evaluation. Insomnia controlled with Belsomra. Encouraged sleep hygiene measures. Review of Systems   Cardiovascular: Negative for palpitations. Neurological: Negative for dizziness and headaches. Psychiatric/Behavioral: Negative for dysphoric mood and suicidal ideas. All other systems reviewed and are negative. No flowsheet data found.      Physical Exam    [INSTRUCTIONS:  \"[x]\" Indicates a positive item  \"[]\" Indicates a negative item  -- DELETE ALL ITEMS NOT EXAMINED]    Constitutional: [x] Appears well-developed and well-nourished [x] No apparent distress      [] Abnormal -     Mental status: [x] Alert and awake  [x] Oriented to person/place/time [x] Able to follow commands    [] Abnormal -     Eyes:   EOM    [x]  Normal    [] Abnormal -   Sclera  [x]  Normal    [] Abnormal -          Discharge [x]  None visible   [] Abnormal -     HENT: [x] Normocephalic, atraumatic  [] Abnormal -   [x] Mouth/Throat: Mucous membranes are moist    External Ears [x] Normal  [] Abnormal -    Neck: [x] No visualized mass [] Abnormal -     Pulmonary/Chest: [x] Respiratory effort normal   [x] No visualized signs of difficulty breathing or respiratory distress        [] Abnormal -      Musculoskeletal:   [x] Normal gait with no signs of ataxia         [x] Normal range of motion of neck        [] Abnormal -     Neurological:        [x] No Facial Asymmetry (Cranial nerve 7 motor function) (limited exam due to video visit)          [x] No gaze palsy        [] Abnormal -          Skin:        [x] No significant exanthematous lesions or discoloration noted on facial skin         [] Abnormal -            Psychiatric:       [x] Normal Affect [] Abnormal -        [x] No Hallucinations    Other pertinent observable physical exam findings:- none        Kelly Ibanez, was evaluated through a synchronous (real-time) audio-video encounter. The patient (or guardian if applicable) is aware that this is a billable service. Verbal consent to proceed has been obtained within the past 12 months. The visit was conducted pursuant to the emergency declaration under the [address] [place] [address] and the [employer] and [employer] Act. Patient identification was verified, and a caregiver was present when appropriate. The patient was located in a state where the provider was credentialed to provide care.       An electronic signature was used to authenticate this note.    --Alex Wadsworth, MASSIEL - CNP

## 2021-09-30 NOTE — ED NOTES
Pt reports itching/burning all over, pt states his roommate has scabies and wants cream because he believes he also has scabies now, pt denies any other complaints     Nina Mendez RN  06/26/21 7915 Post-Care Instructions: I reviewed with the patient in detail post-care instructions. Patient is to keep the site dry overnight, and then apply bacitracin twice daily until healed. Patient may apply hydrogen peroxide soaks to remove any crusting.

## 2021-10-05 ENCOUNTER — CARE COORDINATION (OUTPATIENT)
Dept: CARE COORDINATION | Age: 31
End: 2021-10-05

## 2021-10-05 NOTE — CARE COORDINATION
Ambulatory Care Coordination Note  10/5/2021  CM Risk Score: 5  Charlson 10 Year Mortality Risk Score: 10%     ACC: Ej Scott, RN    Summary Note: Aisha Saldana is being followed by care coordination for education and assistance in reducing ED utilization. Spoke with Aisha Saldana today for f/u. Pt has h/o drug abuse. On Suboxone. Follows with medication mgmt. Currently staying at [facility] drug and alcohol rehab program in [facility]. Pt reports program is 6mths-1yr. Has been there for a couple of wks. Seeing counselor, psych. Reports staff takes him if he needs to go places. Educated on appropriate utilization. Encouraged to contact PCP office with new s/s. Last few ED visits were for rectal bleeding and abd pain. Pt reports that he is having neither at present time. Concerned about his deep brain stimulator. Had placed sometime ago at [facility]. Not currently following with neurology. Encouraged to get connected with one locally. Plan of care:  Encouraged to continue participating in inpt drug and alcohol program  Encouraged continued f/u with medication mgmt  Close f/u with psych and counseling  Discuss with staff on assistance in finding neurologist.   Call office with new concerns s/s. Utilize walk in clinics for non emergency issues  General Assessment    Do you have any symptoms that are causing concern?: Yes  Progression since Onset: Gradually Improving  Reported Symptoms:  (Comment: currently admitted to [facility] drug [facility] alcohol treatment center.   reports that he will be there 6mts-1yr. )               Care Coordination Interventions    Program Enrollment: Complex Care  Referral from Primary Care Provider: No  Suggested Interventions and [facility] [address]: Completed (Comment: Imelda Winston)  Disease Specific Clinic: Completed (Comment: suboxone clinic)  Medi Set or Pill Pack: Declined         Goals Addressed    None         Prior to Admission medications Medication Sig Start Date End Date Taking? Authorizing Provider   buprenorphine-naloxone (SUBOXONE) 8-2 MG FILM SL film Place 1 Film under the tongue 2 times daily for 14 days. 9/24/21 10/8/21  MASSIEL Parikh CNP   gabapentin (NEURONTIN) 800 MG tablet Take 1 tablet by mouth 3 times daily for 30 days. 9/24/21 10/24/21  MASSIEL Parikh - CNP   tiZANidine (ZANAFLEX) 4 MG tablet Take 1 tablet by mouth 3 times daily as needed (SCIATICA) 9/24/21   MASSIEL Parikh - SO   Suvorexant (BELSOMRA) 5 MG TABS Take 5 mg by mouth daily for 30 days.  9/24/21 10/24/21  MASSIEL Parikh CNP   diphenhydrAMINE-Zinc Acetate (BENADRYL ITCH RELIEF) 2-0.1 % STCK Apply 1 each topically 2 times daily 9/19/21   Anival Maldonado DO   promethazine (PHENERGAN) 25 MG tablet Take 1 tablet by mouth 3 times daily as needed for Nausea 9/7/21   MASSIEL Parikh CNP   ondansetron (ZOFRAN ODT) 4 MG disintegrating tablet Take 1 tablet by mouth every 8 hours as needed for Nausea 8/23/21   Barney Barnett DO   sucralfate (CARAFATE) 1 GM tablet Take 1 tablet by mouth 4 times daily 8/23/21   Barney Barnett DO   diphenhydrAMINE (BENADRYL) 25 MG tablet Take 25 mg by mouth 2 times daily     Historical Provider, MD   cloNIDine (CATAPRES) 0.1 MG tablet Take 0.1 mg by mouth 3 times daily    Historical Provider, MD   hydrOXYzine (ATARAX) 50 MG tablet Take 50 mg by mouth 2 times daily    Historical Provider, MD   meloxicam (MOBIC) 7.5 MG tablet Take 1 tablet by mouth 2 times daily as needed for Pain 8/19/21 9/18/21  MASSIEL Parikh CNP   pantoprazole (PROTONIX) 40 MG tablet Take 1 tablet by mouth every morning (before breakfast) 8/19/21   MASSIEL Parikh CNP   QUEtiapine (SEROQUEL) 100 MG tablet Take 1 tablet by mouth nightly 8/13/21   MASSIEL Parikh CNP   ARIPiprazole (ABILIFY) 15 MG tablet Take 1 tablet by mouth daily 8/13/21   MASSIEL Parikh CNP   metFORMIN (GLUCOPHAGE) 500 MG tablet Take 1 tablet by mouth 2 times daily (with meals) 7/26/21   Jared Burgos MD   Multiple Vitamins-Minerals (THERAPEUTIC MULTIVITAMIN-MINERALS) tablet Take 1 tablet by mouth daily 7/26/21   Jared Burgos MD   traZODone (DESYREL) 150 MG tablet Take 1 tablet by mouth nightly 7/26/21   Jared Burgos MD       Future Appointments   Date Time Provider Jaquan Bailey   10/7/2021  1:00 PM MASSIEL Hewitt - CNP SRPX  [facility] - [facility]

## 2021-10-07 ENCOUNTER — VIRTUAL VISIT (OUTPATIENT)
Dept: INTERNAL MEDICINE CLINIC | Age: 31
End: 2021-10-07
Payer: MEDICARE

## 2021-10-07 DIAGNOSIS — F51.01 PRIMARY INSOMNIA: Primary | ICD-10-CM

## 2021-10-07 DIAGNOSIS — F11.20 SEVERE OPIOID USE DISORDER (HCC): ICD-10-CM

## 2021-10-07 PROCEDURE — G8417 CALC BMI ABV UP PARAM F/U: HCPCS | Performed by: NURSE PRACTITIONER

## 2021-10-07 PROCEDURE — 99213 OFFICE O/P EST LOW 20 MIN: CPT | Performed by: NURSE PRACTITIONER

## 2021-10-07 PROCEDURE — 4004F PT TOBACCO SCREEN RCVD TLK: CPT | Performed by: NURSE PRACTITIONER

## 2021-10-07 PROCEDURE — G8484 FLU IMMUNIZE NO ADMIN: HCPCS | Performed by: NURSE PRACTITIONER

## 2021-10-07 PROCEDURE — G8428 CUR MEDS NOT DOCUMENT: HCPCS | Performed by: NURSE PRACTITIONER

## 2021-10-07 RX ORDER — PROMETHAZINE HYDROCHLORIDE 25 MG/1
25 TABLET ORAL 3 TIMES DAILY PRN
Qty: 30 TABLET | Refills: 0 | Status: SHIPPED | OUTPATIENT
Start: 2021-10-07 | End: 2021-10-16

## 2021-10-07 RX ORDER — TIZANIDINE 4 MG/1
4 TABLET ORAL 3 TIMES DAILY PRN
Qty: 30 TABLET | Refills: 0 | Status: ON HOLD | OUTPATIENT
Start: 2021-10-07 | End: 2021-11-01 | Stop reason: HOSPADM

## 2021-10-07 RX ORDER — SUVOREXANT 20 MG/1
20 TABLET, FILM COATED ORAL NIGHTLY
Qty: 30 TABLET | Refills: 0 | Status: ON HOLD | OUTPATIENT
Start: 2021-10-07 | End: 2021-11-01 | Stop reason: HOSPADM

## 2021-10-07 RX ORDER — BUPRENORPHINE AND NALOXONE 12; 3 MG/1; MG/1
1 FILM, SOLUBLE BUCCAL; SUBLINGUAL DAILY
Qty: 18 FILM | Refills: 0 | Status: SHIPPED | OUTPATIENT
Start: 2021-10-07 | End: 2021-10-25

## 2021-10-07 RX ORDER — GABAPENTIN 600 MG/1
600 TABLET ORAL 4 TIMES DAILY
Qty: 120 TABLET | Refills: 0 | Status: SHIPPED | OUTPATIENT
Start: 2021-10-07 | End: 2021-11-04 | Stop reason: SDUPTHER

## 2021-10-07 RX ORDER — CLONIDINE HYDROCHLORIDE 0.1 MG/1
0.1 TABLET ORAL 3 TIMES DAILY
Qty: 90 TABLET | Refills: 0 | Status: ON HOLD | OUTPATIENT
Start: 2021-10-07 | End: 2021-11-22 | Stop reason: SDUPTHER

## 2021-10-16 ENCOUNTER — APPOINTMENT (OUTPATIENT)
Dept: GENERAL RADIOLOGY | Age: 31
End: 2021-10-16
Payer: MEDICARE

## 2021-10-16 ENCOUNTER — HOSPITAL ENCOUNTER (EMERGENCY)
Age: 31
Discharge: HOME | End: 2021-10-16
Attending: EMERGENCY MEDICINE
Payer: MEDICARE

## 2021-10-16 VITALS
TEMPERATURE: 97.5 F | RESPIRATION RATE: 16 BRPM | SYSTOLIC BLOOD PRESSURE: 147 MMHG | HEART RATE: 94 BPM | DIASTOLIC BLOOD PRESSURE: 93 MMHG | WEIGHT: 220 LBS | HEIGHT: 69 IN | OXYGEN SATURATION: 96 % | BODY MASS INDEX: 32.58 KG/M2

## 2021-10-16 DIAGNOSIS — F41.1 ANXIETY STATE: ICD-10-CM

## 2021-10-16 DIAGNOSIS — M79.604 RIGHT LEG PAIN: ICD-10-CM

## 2021-10-16 DIAGNOSIS — L29.9 PRURITIC DISORDER: ICD-10-CM

## 2021-10-16 DIAGNOSIS — R11.0 NAUSEA: Primary | ICD-10-CM

## 2021-10-16 LAB
ALBUMIN SERPL-MCNC: 3.6 G/DL (ref 3.5–5.2)
ALP BLD-CCNC: 77 U/L (ref 40–129)
ALT SERPL-CCNC: 5 U/L (ref 0–40)
ANION GAP SERPL CALCULATED.3IONS-SCNC: 12 MMOL/L (ref 7–16)
AST SERPL-CCNC: 30 U/L (ref 0–39)
BILIRUB SERPL-MCNC: 0.3 MG/DL (ref 0–1.2)
BUN BLDV-MCNC: 12 MG/DL (ref 6–20)
CALCIUM SERPL-MCNC: 8.7 MG/DL (ref 8.6–10.2)
CHLORIDE BLD-SCNC: 102 MMOL/L (ref 98–107)
CO2: 20 MMOL/L (ref 22–29)
CREAT SERPL-MCNC: 0.8 MG/DL (ref 0.7–1.2)
GFR AFRICAN AMERICAN: >60
GFR NON-AFRICAN AMERICAN: >60 ML/MIN/1.73
GLUCOSE BLD-MCNC: 98 MG/DL (ref 74–99)
LIPASE: 19 U/L (ref 13–60)
POTASSIUM SERPL-SCNC: 4.7 MMOL/L (ref 3.5–5)
SODIUM BLD-SCNC: 134 MMOL/L (ref 132–146)
TOTAL PROTEIN: 7.4 G/DL (ref 6.4–8.3)
TROPONIN, HIGH SENSITIVITY: <6 NG/L (ref 0–11)

## 2021-10-16 PROCEDURE — 80053 COMPREHEN METABOLIC PANEL: CPT

## 2021-10-16 PROCEDURE — 6370000000 HC RX 637 (ALT 250 FOR IP): Performed by: EMERGENCY MEDICINE

## 2021-10-16 PROCEDURE — 93005 ELECTROCARDIOGRAM TRACING: CPT | Performed by: EMERGENCY MEDICINE

## 2021-10-16 PROCEDURE — 2580000003 HC RX 258: Performed by: EMERGENCY MEDICINE

## 2021-10-16 PROCEDURE — 6360000002 HC RX W HCPCS: Performed by: EMERGENCY MEDICINE

## 2021-10-16 PROCEDURE — 99283 EMERGENCY DEPT VISIT LOW MDM: CPT

## 2021-10-16 PROCEDURE — 71046 X-RAY EXAM CHEST 2 VIEWS: CPT

## 2021-10-16 PROCEDURE — 96375 TX/PRO/DX INJ NEW DRUG ADDON: CPT

## 2021-10-16 PROCEDURE — 84484 ASSAY OF TROPONIN QUANT: CPT

## 2021-10-16 PROCEDURE — 83690 ASSAY OF LIPASE: CPT

## 2021-10-16 PROCEDURE — 96374 THER/PROPH/DIAG INJ IV PUSH: CPT

## 2021-10-16 PROCEDURE — 73552 X-RAY EXAM OF FEMUR 2/>: CPT

## 2021-10-16 PROCEDURE — 36415 COLL VENOUS BLD VENIPUNCTURE: CPT

## 2021-10-16 RX ORDER — PROMETHAZINE HYDROCHLORIDE 25 MG/ML
25 INJECTION, SOLUTION INTRAMUSCULAR; INTRAVENOUS ONCE
Status: DISCONTINUED | OUTPATIENT
Start: 2021-10-16 | End: 2021-10-17 | Stop reason: HOSPADM

## 2021-10-16 RX ORDER — LORAZEPAM 2 MG/ML
1 INJECTION INTRAMUSCULAR ONCE
Status: DISCONTINUED | OUTPATIENT
Start: 2021-10-16 | End: 2021-10-17 | Stop reason: HOSPADM

## 2021-10-16 RX ORDER — LORAZEPAM 2 MG/ML
2 INJECTION INTRAMUSCULAR ONCE
Status: COMPLETED | OUTPATIENT
Start: 2021-10-16 | End: 2021-10-16

## 2021-10-16 RX ORDER — DIPHENHYDRAMINE HYDROCHLORIDE 50 MG/ML
50 INJECTION INTRAMUSCULAR; INTRAVENOUS ONCE
Status: COMPLETED | OUTPATIENT
Start: 2021-10-16 | End: 2021-10-16

## 2021-10-16 RX ORDER — 0.9 % SODIUM CHLORIDE 0.9 %
500 INTRAVENOUS SOLUTION INTRAVENOUS ONCE
Status: DISCONTINUED | OUTPATIENT
Start: 2021-10-16 | End: 2021-10-16

## 2021-10-16 RX ORDER — HYDROXYZINE PAMOATE 25 MG/1
25 CAPSULE ORAL 3 TIMES DAILY PRN
Qty: 30 CAPSULE | Refills: 0 | Status: SHIPPED | OUTPATIENT
Start: 2021-10-16 | End: 2021-10-26

## 2021-10-16 RX ORDER — PROMETHAZINE HYDROCHLORIDE 25 MG/1
25 TABLET ORAL EVERY 6 HOURS PRN
Qty: 10 TABLET | Refills: 0 | Status: SHIPPED | OUTPATIENT
Start: 2021-10-16 | End: 2021-10-19

## 2021-10-16 RX ADMIN — DIPHENHYDRAMINE HYDROCHLORIDE 50 MG: 50 INJECTION, SOLUTION INTRAMUSCULAR; INTRAVENOUS at 20:11

## 2021-10-16 RX ADMIN — LORAZEPAM 2 MG: 2 INJECTION INTRAMUSCULAR; INTRAVENOUS at 20:11

## 2021-10-16 RX ADMIN — SODIUM CHLORIDE 500 ML: 9 INJECTION, SOLUTION INTRAVENOUS at 20:12

## 2021-10-16 RX ADMIN — LIDOCAINE HYDROCHLORIDE: 20 SOLUTION ORAL; TOPICAL at 22:34

## 2021-10-16 NOTE — ED PROVIDER NOTES
Department of Emergency Medicine   ED  Provider Note  Admit Date/RoomTime: 10/16/2021  6:36 PM  ED Room: 09/09          History of Present Illness:  10/16/21, Time: 6:54 PM EDT  Chief Complaint   Patient presents with    Other     PT states \"I am requesting the best neurosurgeon to remove a chip in his left thigh that is connected to my sciatic nerve messing with my heart. \"                Rashawn Vasquez is a 27 y.o. male presenting to the ED for several complaints including nausea dehydration chest pain and concern for microchip in his right thigh, beginning ongoing. The complaint has been intermittent, moderate in severity, and worsened by nothing. Patient presents for several complaints. He states nausea dehydration. States multiple episodes of nonbloody nonbilious emesis beginning in the last few days. Denies recent drug alcohol use. He also complains of diffuse itching on his skin, thinks he had an allergic reaction to something he ate earlier this morning. He also request neurosurgery to remove microchips that is in his right thigh, states was inserted many years ago for a deep brain stimulator. He specifically denies suicidal homicidal ideation. Denies visual auditory hallucinations. Normally from outside area where he does follow with psychiatry there. Reports he was referred to [place] for deep brain stimulator removal and he is concerned that is controlling his body.     Review of Systems:   Pertinent positives and negatives are stated within HPI, all other systems reviewed and are negative.        --------------------------------------------- PAST HISTORY ---------------------------------------------  Past Medical History:  has a past medical history of Anxiety, Arthritis, Asthma, Bipolar I disorder, most recent episode (or current) depressed, unspecified, Clostridium difficile infection, COPD (chronic obstructive pulmonary disease) ([facility] [address] Depression, Disease of blood and blood forming organ, Eczema, Fracture, metacarpal, Gastric ulcer, Gastritis, GERD (gastroesophageal reflux disease), GI bleed, H. 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 Past Surgical History:  has a past surgical history that includes Colonoscopy; bronchoscopy; other surgical history; Upper gastrointestinal endoscopy (2/4/16); pr egd transoral biopsy single/multiple (N/A, 3/20/2017); sigmoidoscopy (N/A, 3/20/2017); Cholecystectomy, laparoscopic (07/14/2017); pr esophagogastroduodenoscopy transoral diagnostic (N/A, 8/9/2017); pr colonoscopy w/biopsy single/multiple (8/9/2017); Abdomen surgery; Upper gastrointestinal endoscopy (N/A, 6/13/2018); Upper gastrointestinal endoscopy (N/A, 9/20/2018); and Endoscopy, colon, diagnostic. Social History:  reports that he has been smoking cigarettes. He has a 7.50 pack-year smoking history. He has never used smokeless tobacco. He reports previous alcohol use. He reports previous drug use. Drug: Opiates . Family History: family history includes Alcohol Abuse in his father; Arthritis in his father and mother; Colon Cancer (age of onset: 43) in his paternal cousin; Depression in his brother and father; Diabetes in his brother and father; High Blood Pressure in his father; Mental Illness in his brother; Migraines in his brother, father, mother, and sister; Other in his brother, brother, father, mother, and sister; Stroke in an other family member. . Unless otherwise noted, family history is non contributory    The patients home medications have been reviewed.     Allergies: Dicyclomine, Famotidine, Geodon [ziprasidone hcl], Haloperidol, Iv dye [iodides], Ibuprofen, Aspirin, Dicyclomine hcl, Flagyl [metronidazole], Iodine, and Mirtazapine        ---------------------------------------------------PHYSICAL EXAM--------------------------------------    Constitutional/General: Alert and oriented x3  Head: Normocephalic and atraumatic  Eyes: PERRL, EOMI, sclera non icteric  Mouth: Oropharynx clear, handling secretions, no trismus, no asymmetry of the posterior oropharynx or uvular edema  Neck: Supple, full ROM, no stridor, no meningeal signs  Respiratory: Lungs clear to auscultation bilaterally,Not in respiratory distress  Cardiovascular:  Regular rate. Regular rhythm. 2+ distal pulses. Equal extremity pulses. Chest: No chest wall tenderness  GI:  Abdomen Soft, Non tender, Non distended. No rebound, guarding, or rigidity. Musculoskeletal: Moves all extremities x 4. Warm and well perfused, he points to area on the right inner thigh which he states he has implanted microchip. No appreciable masses identified capillary refill <3 seconds  Integument: skin warm and dry. Areas on the patient's chest where he has been itching. Excoriation but no lacerations for closure  Neurologic: GCS 15, no focal deficits,   Psychiatric: Calm affect      EKG: Interpreted by emergency department physician, Dr. Osmar Wayne   This EKG is signed and interpreted by me. Rate: 85  Rhythm: Sinus  Interpretation: NSR, normal axis, no other acute findings   Comparison: no previous EKG available      -------------------------------------------------- RESULTS -------------------------------------------------  I have personally reviewed all laboratory and imaging results for this patient. Results are listed below.      LABS: (Lab results interpreted by me)  Results for orders placed or performed during the hospital encounter of 10/16/21   Troponin   Result Value Ref Range    Troponin, High Sensitivity <6 0 - 11 ng/L   Comprehensive Metabolic Panel   Result Value Ref Range    Sodium 134 132 - 146 mmol/L    Potassium 4.7 3.5 - 5.0 mmol/L    Chloride 102 98 - 107 mmol/L    CO2 20 (L) 22 - 29 mmol/L    Anion Gap 12 7 - 16 mmol/L    Glucose 98 74 - 99 mg/dL    BUN 12 6 - 20 mg/dL    CREATININE 0.8 0.7 - 1.2 mg/dL    GFR Non-African American >60 >=60 mL/min/1.73    GFR African American >60     Calcium 8.7 8.6 - 10.2 mg/dL    Total Protein 7.4 6.4 - 8.3 g/dL    Albumin 3.6 3.5 - 5.2 g/dL    Total Bilirubin 0.3 0.0 - 1.2 mg/dL    Alkaline Phosphatase 77 40 - 129 U/L    ALT 5 0 - 40 U/L    AST 30 0 - 39 U/L   Lipase   Result Value Ref Range    Lipase 19 13 - 60 U/L   EKG 12 Lead   Result Value Ref Range    Ventricular Rate 85 BPM    Atrial Rate 85 BPM    P-R Interval 136 ms    QRS Duration 82 ms    Q-T Interval 334 ms    QTc Calculation (Bazett) 397 ms    P Axis 17 degrees    R Axis 57 degrees    T Axis 46 degrees   ,       RADIOLOGY:  Interpreted by Radiologist unless otherwise specified  XR CHEST (2 VW)   Final Result   No acute process. XR FEMUR RIGHT (MIN 2 VIEWS)   Final Result   1. No definite foreign body identified in the soft tissues imaged. Patient   appears to have a chewing tobacco tin  along the outer aspect of his distal   right thigh. Please correlate with patient's personal belongings. 2.  Distal right femur bone infarction (favored) versus enchondroma. 3.  Mild right hip osteoarthritis.                          ------------------------- NURSING NOTES AND VITALS REVIEWED ---------------------------   The nursing notes within the ED encounter and vital signs as below have been reviewed by myself  BP (!) 147/93   Pulse 94   Temp 97.5 °F (36.4 °C) (Infrared)   Resp 16   Ht 5' 9\" (1.753 m)   Wt 220 lb (99.8 kg)   SpO2 96%   BMI 32.49 kg/m²     Oxygen Saturation Interpretation: Normal         The patients available past medical records and past encounters were reviewed.         ------------------------------ ED COURSE/MEDICAL DECISION MAKING----------------------  Medications   promethazine (PHENERGAN) injection 25 mg (has no administration in time range)   LORazepam (ATIVAN) injection 1 mg (has no administration in time range)   diphenhydrAMINE (BENADRYL) injection 50 mg (50 mg IntraVENous Given 10/16/21 2011)   LORazepam (ATIVAN) injection 2 mg (2 mg IntraVENous Given 10/16/21 2011)   aluminum & magnesium hydroxide-simethicone (MAALOX) 30 mL, lidocaine viscous hcl (XYLOCAINE) 5 mL (GI COCKTAIL) ( Oral Given 10/16/21 2234)                    Medical Decision Making:     I, Dr. Min Garcia am the primary provider of record    Work-up undertaken, he improved after medications. Discussed x-ray findings, no retained foreign body. States he feels better and is requesting discharge home. His CBC and serum drug hemolyzed and required redraw, he refused this. Specifically denies suicidal homicidal ideation. He was given outpatient follow-up. Re-Evaluations:        Re-evaluation. Patients symptoms are improving  Repeat physical examination is improved  - Pain better controlled, pt states they feel \"better\", tolerating PO well. He is requesting GI cocktail and discharge          This patient's ED course included: a personal history and physicial examination, re-evaluation prior to disposition, cardiac monitoring, continuous pulse oximetry and complex medical decision making and emergency management    This patient has remained hemodynamically stable during their ED course. Counseling: The emergency provider has spoken with the patient and discussed todays results, in addition to providing specific details for the plan of care and counseling regarding the diagnosis and prognosis. Questions are answered at this time and they are agreeable with the plan.       --------------------------------- IMPRESSION AND DISPOSITION ---------------------------------    IMPRESSION  1. Nausea    2. Right leg pain    3. Pruritic disorder    4. Anxiety state        DISPOSITION  Disposition: Discharge to home  Patient condition is stable        NOTE: This report was transcribed using voice recognition software.  Every effort was made to ensure accuracy; however, inadvertent computerized transcription errors may be present       Kevin Rosa DO  10/16/21 0143

## 2021-10-18 LAB
EKG ATRIAL RATE: 85 BPM
EKG P AXIS: 17 DEGREES
EKG P-R INTERVAL: 136 MS
EKG Q-T INTERVAL: 334 MS
EKG QRS DURATION: 82 MS
EKG QTC CALCULATION (BAZETT): 397 MS
EKG R AXIS: 57 DEGREES
EKG T AXIS: 46 DEGREES
EKG VENTRICULAR RATE: 85 BPM

## 2021-10-18 PROCEDURE — 93010 ELECTROCARDIOGRAM REPORT: CPT | Performed by: INTERNAL MEDICINE

## 2021-10-20 ENCOUNTER — CARE COORDINATION (OUTPATIENT)
Dept: CARE COORDINATION | Age: 31
End: 2021-10-20

## 2021-10-20 NOTE — CARE COORDINATION
Ambulatory Care Coordination Note  10/20/2021  CM Risk Score: 5  Charlson 10 Year Mortality Risk Score: 10%     ACC: Vladimir Zhao, FLORY    Summary Note: Tricia Tong is being followed by care coordination for education and assistance in reducing ED utilization. Spoke with Tricia Tong briefly today. Pt reports that he continues to take part in [place] [address] drug treatment in program.    Pt was seen in ED on 10/16 for right leg pain, pruritis, and nausea. Was prescribed phenergan and hydroxyzine. Sleeping better. Reports s/s are better. Discussed utilization and importance of getting established with provider in [facility] that can address his needs since he may be at inpt treatment facility for up to a year. Pt verbalizes understanding. Pt reports that he is participating in counseling and group sessions at facility. Has a CM. Pt unable to recall her name states \"I can't remember. I just got assigned a new one because the other one left. \"   Attempted to reach out to [place] [address]. Message left with admissions coord. Requesting return call. Pt continues to f/u with medication mgmt for suboxone. Pt reports that nsg staff at facility manages all of him medications. Educated pt on importance of PCP f/u and utilization of walk in clinic for non emergent s/s. Plan of care: Will attempt f/u with pt's CM at [address] [place] [address]   Pt to discuss with his CM about getting connected with PCP and specialists in the area.    Utilize walk in clinic or staff at facility for non emergent concerns  Continue with drug treatment program    General Assessment    Do you have any symptoms that are causing concern?: Yes  Progression since Onset: Gradually Improving  Reported Symptoms: Nausea, Pain (Comment: pruritus, leg pain, nausea. )               Care Coordination Interventions    Program Enrollment: Complex Care  Referral from Primary Care Provider: No  Suggested Interventions and Community Resources  Behavorial Health: Completed (Comment: [facility])  Disease Specific Clinic: Completed (Comment: suboxone clinic)  Medi Set or Pill Pack: Declined         Goals Addressed                 This Visit's Progress    [employer] Goal   On track     I will complete inpt drug and alcohol program.    Barriers: lack of motivation, stress, and lack of education  Plan for overcoming my barriers: education and support from PCP, specialists, ACM. Attend counseling sessions and psych appts. Continue with [employer] program.   Confidence: 9/10  Anticipated Goal Completion Date: 12/5/21            Prior to Admission medications    Medication Sig Start Date End Date Taking? Authorizing Provider   hydrOXYzine (VISTARIL) 25 MG capsule Take 1 capsule by mouth 3 times daily as needed for Itching 10/16/21 10/26/21  Donna Banerjee DO   Suvorexant (BELSOMRA) 20 MG TABS Take 1 tablet by mouth nightly for 30 days. 10/7/21 11/6/21  MASSIEL Jacobsen CNP   tiZANidine (ZANAFLEX) 4 MG tablet Take 1 tablet by mouth 3 times daily as needed (SCIATICA) 10/7/21   MASSIEL Jacobsen CNP   cloNIDine (CATAPRES) 0.1 MG tablet Take 1 tablet by mouth 3 times daily 10/7/21   MASSIEL Jacobsen CNP   buprenorphine-naloxone (SUBOXONE) 12-3 MG sublingual film Place 1 Film under the tongue daily for 18 days. 10/7/21 10/25/21  MASSIEL Jacobsen CNP   gabapentin (NEURONTIN) 600 MG tablet Take 1 tablet by mouth 4 times daily for 30 days. 10/7/21 11/6/21  MASSIEL Jacobsen CNP   gabapentin (NEURONTIN) 800 MG tablet Take 1 tablet by mouth 3 times daily for 30 days. 9/24/21 10/24/21  MASSIEL Jacobsen CNP   Suvorexant (BELSOMRA) 5 MG TABS Take 5 mg by mouth daily for 30 days.  9/24/21 10/24/21  MASSIEL Jacobsen CNP   diphenhydrAMINE-Zinc Acetate (BENADRYL ITCH RELIEF) 2-0.1 % STCK Apply 1 each topically 2 times daily 9/19/21   Sidra Epperson,    ondansetron (ZOFRAN ODT) 4 MG disintegrating tablet Take 1 tablet by mouth every 8 hours as needed for Nausea 8/23/21   Terell Armenta, DO   sucralfate (CARAFATE) 1 GM tablet Take 1 tablet by mouth 4 times daily 8/23/21   Brand Kathi, DO   diphenhydrAMINE (BENADRYL) 25 MG tablet Take 25 mg by mouth 2 times daily     Historical Provider, MD   hydrOXYzine (ATARAX) 50 MG tablet Take 50 mg by mouth 2 times daily    Historical Provider, MD   meloxicam (MOBIC) 7.5 MG tablet Take 1 tablet by mouth 2 times daily as needed for Pain 8/19/21 9/18/21  MASSIEL Urias CNP   pantoprazole (PROTONIX) 40 MG tablet Take 1 tablet by mouth every morning (before breakfast) 8/19/21   MASSIEL Urias CNP   QUEtiapine (SEROQUEL) 100 MG tablet Take 1 tablet by mouth nightly 8/13/21   MASSIEL Urias CNP   ARIPiprazole (ABILIFY) 15 MG tablet Take 1 tablet by mouth daily 8/13/21   MASSIEL Urias CNP   metFORMIN (GLUCOPHAGE) 500 MG tablet Take 1 tablet by mouth 2 times daily (with meals) 7/26/21   Nate Malone MD   Multiple Vitamins-Minerals (THERAPEUTIC MULTIVITAMIN-MINERALS) tablet Take 1 tablet by mouth daily 7/26/21   Nate Malone MD   traZODone (DESYREL) 150 MG tablet Take 1 tablet by mouth nightly 7/26/21   Nate Malone MD       Future Appointments   Date Time Provider Jaquan Avalos   10/25/2021  3:00 PM MASSIEL Urias CNP SRPX  [facility]

## 2021-10-21 NOTE — CARE COORDINATION
Received call back from [address]  at [address]. Informed that pt is no longer at facility. Pt left facility on 10/19 by his choice. Pt told me yesterday that he was still at treatment facility center. Admissions coord shared with me that they had multiple problems with pt during his short stay.

## 2021-10-28 ENCOUNTER — CARE COORDINATION (OUTPATIENT)
Dept: CARE COORDINATION | Age: 31
End: 2021-10-28

## 2021-10-29 ENCOUNTER — HOSPITAL ENCOUNTER (EMERGENCY)
Age: 31
Discharge: HOME | End: 2021-10-29
Attending: EMERGENCY MEDICINE
Payer: MEDICARE

## 2021-10-29 VITALS
HEART RATE: 81 BPM | HEIGHT: 69 IN | TEMPERATURE: 97.2 F | WEIGHT: 250 LBS | RESPIRATION RATE: 20 BRPM | DIASTOLIC BLOOD PRESSURE: 78 MMHG | OXYGEN SATURATION: 94 % | BODY MASS INDEX: 37.03 KG/M2 | SYSTOLIC BLOOD PRESSURE: 121 MMHG

## 2021-10-29 DIAGNOSIS — M25.551 PAIN IN JOINT INVOLVING RIGHT PELVIC REGION AND THIGH: Primary | ICD-10-CM

## 2021-10-29 PROCEDURE — 99282 EMERGENCY DEPT VISIT SF MDM: CPT

## 2021-10-29 PROCEDURE — 96372 THER/PROPH/DIAG INJ SC/IM: CPT

## 2021-10-29 PROCEDURE — 6360000002 HC RX W HCPCS: Performed by: STUDENT IN AN ORGANIZED HEALTH CARE EDUCATION/TRAINING PROGRAM

## 2021-10-29 PROCEDURE — 99283 EMERGENCY DEPT VISIT LOW MDM: CPT

## 2021-10-29 RX ORDER — KETOROLAC TROMETHAMINE 30 MG/ML
30 INJECTION, SOLUTION INTRAMUSCULAR; INTRAVENOUS ONCE
Status: COMPLETED | OUTPATIENT
Start: 2021-10-29 | End: 2021-10-29

## 2021-10-29 RX ORDER — ONDANSETRON 4 MG/1
4 TABLET, ORALLY DISINTEGRATING ORAL ONCE
Status: DISCONTINUED | OUTPATIENT
Start: 2021-10-29 | End: 2021-10-29

## 2021-10-29 RX ADMIN — KETOROLAC TROMETHAMINE 30 MG: 30 INJECTION, SOLUTION INTRAMUSCULAR at 04:42

## 2021-10-29 ASSESSMENT — PAIN DESCRIPTION - DESCRIPTORS: DESCRIPTORS: PRESSURE

## 2021-10-29 ASSESSMENT — PAIN SCALES - GENERAL
PAINLEVEL_OUTOF10: 8
PAINLEVEL_OUTOF10: 8

## 2021-10-29 ASSESSMENT — PAIN DESCRIPTION - LOCATION: LOCATION: BACK;LEG

## 2021-10-29 ASSESSMENT — PAIN DESCRIPTION - FREQUENCY: FREQUENCY: CONTINUOUS

## 2021-10-29 NOTE — ED TRIAGE NOTES
Pt reports to the ED via triage with c/o Right inner thigh pain that radiates just below his belly button. PT states the hole appeared years ago with no mechanism of injury - PT states it appeared one day. States that he see's a neurosurgeon at [facility]; Has gotten a surgery done for a stimulator placed near/in his Right inner thigh. PT states he has been feeling nauseous. Denies fever, chills, diarrhea. Denies chest pain, States he has had slight SOB lately; PT states he smokes half a pack a day. Denies drug use, Denies alcohol use. PT is ambulatory without issues or assistance. A&Ox4, able to answer questions appropriately and in full sentences. Even and unlabored respirations, No signs of distress or trauma noted at this time.

## 2021-10-29 NOTE — ED PROVIDER NOTES
I disorder, most recent episode (or current) depressed, unspecified, Clostridium difficile infection, COPD (chronic obstructive pulmonary disease) ([facility] Depression, Disease of blood and blood forming organ, Eczema, Fracture, metacarpal, Gastric ulcer, Gastritis, GERD (gastroesophageal reflux disease), GI bleed, H. 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 has a past surgical history that includes Colonoscopy; bronchoscopy; other surgical history; Upper gastrointestinal endoscopy (2/4/16); pr egd transoral biopsy single/multiple (N/A, 3/20/2017); sigmoidoscopy (N/A, 3/20/2017); Cholecystectomy, laparoscopic (07/14/2017); pr esophagogastroduodenoscopy transoral diagnostic (N/A, 8/9/2017); pr colonoscopy w/biopsy single/multiple (8/9/2017); Abdomen surgery; Upper gastrointestinal endoscopy (N/A, 6/13/2018); Upper gastrointestinal endoscopy (N/A, 9/20/2018); and Endoscopy, colon, diagnostic.     Social History     Socioeconomic History    Marital status: Single     Spouse name: Not on file    Number of children: Not on file    Years of education: Not on file    Highest education level: Not on file   Occupational History    Occupation: disability   Tobacco Use    Smoking status: Current Every Day Smoker     Packs/day: 0.50     Years: 15.00     Pack years: 7.50     Types: Cigarettes    Smokeless tobacco: Never Used   Vaping Use    Vaping Use: Former   Substance and Sexual Activity    Alcohol use: Not Currently     Comment: social ETOH use    Drug use: Not Currently     Types: Opiates      Comment: last used pain pill/fentanyl 2/18/21    Sexual activity: Yes     Partners: Female     Comment: Lives alone, not working. Other Topics Concern    Not on file   Social History Narrative    ** Merged History Encounter **          Social Determinants of Health     Financial Resource Strain: Unknown    Difficulty of Paying Living Expenses: Patient refused   Food Insecurity: Unknown    Worried About Running Out of Food in the Last Year: Patient refused    [address] in the Last Year: Patient refused   Transportation Needs:     Lack of Transportation (Medical):      Lack of Transportation (Non-Medical):    Physical Activity:     Days of Exercise per Week:     Minutes of Exercise per Session:    Stress:     Feeling of Stress :    Social Connections:     Frequency of Communication with Friends and Family:     Frequency of Social Gatherings with Friends and Family:     Attends Muslim Services:     Active Member of Clubs or Organizations:     Attends Club or Organization Meetings:     Marital Status:    Intimate Partner Violence:     Fear of Current or Ex-Partner:     Emotionally Abused:     Physically Abused:     Sexually Abused:        Family History   Problem Relation Age of Onset    Diabetes Father     Alcohol Abuse Father     Depression Father     Arthritis Father     High Blood Pressure Father     Other Father         aneurysm & epilepsy    Migraines Father     Arthritis Mother     Other Mother         aneurysm & epilepsy    Migraines Mother     Diabetes Brother         Aunt and uncles    Depression Brother     Mental Illness Brother     Other Brother         epilepsy    Migraines Brother     Stroke Other         Uncle    Other Brother         murdered Oct 6th, 2014    Colon Cancer Paternal Cousin 43    Other Sister         epilepsy   Raymond Stager Migraines Sister        Allergies:  Dicyclomine, Famotidine, Geodon [ziprasidone hcl], Haloperidol, Iv dye [iodides], Olanzapine, Reglan [metoclopramide], Ibuprofen, Aspirin, Dicyclomine hcl, Flagyl [metronidazole], Iodine, and Mirtazapine    Home Medications:  Prior to Admission medications    Medication Sig Start Date End Date Taking? Authorizing Provider   pantoprazole (PROTONIX) 40 MG tablet Take 1 tablet by mouth every morning (before breakfast) 10/30/21   Natasha Goodman DO   aluminum & magnesium hydroxide-simethicone (MAALOX) 200-200-20 MG/5ML SUSP suspension Take 5 mLs by mouth every 6 hours as needed for Indigestion 10/30/21   Natasha Goodman DO   Suvorexant (BELSOMRA) 20 MG TABS Take 1 tablet by mouth nightly for 30 days. 10/7/21 11/6/21  MASSIEL Roger - CNP   tiZANidine (ZANAFLEX) 4 MG tablet Take 1 tablet by mouth 3 times daily as needed (SCIATICA) 10/7/21   MASSIEL Roger - CNP   cloNIDine (CATAPRES) 0.1 MG tablet Take 1 tablet by mouth 3 times daily 10/7/21   MASSIEL Roger - CNP   gabapentin (NEURONTIN) 600 MG tablet Take 1 tablet by mouth 4 times daily for 30 days. 10/7/21 11/6/21  MASSIEL Roger - CNP   gabapentin (NEURONTIN) 800 MG tablet Take 1 tablet by mouth 3 times daily for 30 days.  9/24/21 10/24/21  MASSIEL Roger CNP   diphenhydrAMINE-Zinc Acetate (BENADRYL ITCH RELIEF) 2-0.1 % STCK Apply 1 each topically 2 times daily 9/19/21   Sheila Mercedes DO   sucralfate (CARAFATE) 1 GM tablet Take 1 tablet by mouth 4 times daily 8/23/21   River Padilla DO   diphenhydrAMINE (BENADRYL) 25 MG tablet Take 25 mg by mouth 2 times daily     Historical Provider, MD   hydrOXYzine (ATARAX) 50 MG tablet Take 50 mg by mouth 2 times daily    Historical Provider, MD   meloxicam (MOBIC) 7.5 MG tablet Take 1 tablet by mouth 2 times daily as needed for Pain 8/19/21 10/30/21  MASSIEL Roger CNP   QUEtiapine (SEROQUEL) 100 MG tablet Take 1 tablet by mouth nightly 8/13/21   MASSIEL Roger - CNP   ARIPiprazole (ABILIFY) 15 MG tablet Take 1 tablet by mouth daily 8/13/21   MASSIEL Roger - CNP   metFORMIN (GLUCOPHAGE) 500 MG tablet Take 1 tablet by mouth 2 times daily (with meals) 7/26/21   Jose Santiago MD   Multiple Vitamins-Minerals (THERAPEUTIC MULTIVITAMIN-MINERALS) tablet Take 1 tablet by mouth daily 7/26/21   Jose Santiago MD       REVIEW OF SYSTEMS    (2-9 systems for level 4, 10 or more for level 5)      Review of Systems   Constitutional: Negative for activity change, appetite change and fever. HENT: Negative. Eyes: Negative for visual disturbance. Respiratory: Negative for cough and shortness of breath. Cardiovascular: Negative for chest pain, palpitations and leg swelling. Gastrointestinal: Negative for abdominal pain, diarrhea, nausea and vomiting. Genitourinary: Negative. Musculoskeletal: Negative for arthralgias, back pain, gait problem, joint swelling and myalgias. Skin: Negative for pallor, rash and wound. Neurological: Negative for dizziness, tremors, seizures, syncope, weakness, light-headedness, numbness and headaches. PHYSICAL EXAM   (up to 7 for level 4, 8 or more for level 5)      INITIAL VITALS:   /78   Pulse 81   Temp 97.2 °F (36.2 °C) (Oral)   Resp 20   Ht 5' 9\" (1.753 m)   Wt 250 lb (113.4 kg)   SpO2 94%   BMI 36.92 kg/m²     Physical Exam  Vitals reviewed. Constitutional:       General: He is not in acute distress. Appearance: He is not toxic-appearing. HENT:      Head: Normocephalic and atraumatic. Right Ear: Tympanic membrane normal.      Left Ear: Tympanic membrane normal.      Nose: Nose normal.      Mouth/Throat:      Mouth: Mucous membranes are moist.      Pharynx: Oropharynx is clear. Eyes:      Extraocular Movements: Extraocular movements intact. Pupils: Pupils are equal, round, and reactive to light. Cardiovascular:      Rate and Rhythm: Normal rate and regular rhythm. Pulses: Normal pulses. Heart sounds: Normal heart sounds.    Pulmonary:      Effort: Pulmonary effort is normal. Breath sounds: Normal breath sounds. Abdominal:      Palpations: Abdomen is soft. Tenderness: There is no abdominal tenderness. There is no guarding or rebound. Musculoskeletal:         General: No swelling, tenderness, deformity or signs of injury. Normal range of motion. Cervical back: Neck supple. Right lower leg: No edema. Left lower leg: No edema. Comments: Full range of motion of right lower extremity. Neurovascularly intact. Sensation intact. No ecchymosis, erythema, edema or wounds noted to the area. No fluctuance on exam.  Neurologic exam intact. No gait abnormality noted. Patient able to ambulate from triage to the bed without difficulty   Skin:     Capillary Refill: Capillary refill takes less than 2 seconds. Coloration: Skin is not pale. Findings: No bruising or rash. Neurological:      General: No focal deficit present. Mental Status: He is alert and oriented to person, place, and time. Sensory: No sensory deficit. Motor: No weakness. Coordination: Coordination normal.      Gait: Gait normal.         DIFFERENTIAL  DIAGNOSIS     PLAN (LABS / IMAGING / EKG):  No orders of the defined types were placed in this encounter. MEDICATIONS ORDERED:  Orders Placed This Encounter   Medications    DISCONTD: ondansetron (ZOFRAN-ODT) disintegrating tablet 4 mg    ketorolac (TORADOL) injection 30 mg     DDX: Foreign body versus cellulitis versus soft tissue infection versus desire for implanted device removal    DIAGNOSTIC RESULTS / EMERGENCY DEPARTMENT COURSE / [facility]     EMERGENCY DEPARTMENT COURSE:     Patient initially stating that he has a \"stimulator in his thigh\" that was placed by [facility] neurosurgery, and that he would \"like us to call over there and get their neurosurgeon to come here and remove it\". Upon EMR review, no record of any implantation in the thigh. Physical exam unremarkable, no gait abnormality, no focal neuro deficits. Sensation intact and neurovascularly intact in leg, full range of motion on active and passive movement. No erythema, edema, ecchymosis or wounds to the area. No fluctuance. Vital signs stable, afebrile. Discussed with patient that if he had a procedure done at [facility], he should return there as it their records are not available to us. Upon further discussion with patient, he endorses that he has only visited the emergency department today for pain medication. Patient with history of substance abuse, patient given Toradol with resolution of symptoms. Patient given follow-up with [facility] neurosurgery. Discussed return precautions. Patient voiced understanding. CONSULTS:  None    FINAL IMPRESSION      1.  Pain in joint involving right pelvic region and thigh          DISPOSITION / PLAN     DISPOSITION Decision To Discharge 10/29/2021 04:31:24 AM      PATIENT REFERRED TO:  MASSIEL Cool - CNP  [address] [place] [address]    Schedule an appointment as soon as possible for a visit       [facility] ED  [address] [place] [address]  [phone]    If symptoms worsen      DISCHARGE MEDICATIONS:  Discharge Medication List as of 10/29/2021  4:34 AM          Jia Napoles MD  Emergency Medicine Resident    (Please note that portions of thisnote were completed with a voice recognition program.  Efforts were made to edit the dictations but occasionally words are mis-transcribed.)       Jia Napoles MD  Resident  10/31/21 2013

## 2021-10-30 ENCOUNTER — HOSPITAL ENCOUNTER (INPATIENT)
Age: 31
LOS: 3 days | Discharge: HOME | DRG: 885 | End: 2021-11-02
Attending: EMERGENCY MEDICINE | Admitting: PSYCHIATRY & NEUROLOGY
Payer: MEDICARE

## 2021-10-30 ENCOUNTER — HOSPITAL ENCOUNTER (EMERGENCY)
Age: 31
Discharge: HOME | End: 2021-10-30
Attending: STUDENT IN AN ORGANIZED HEALTH CARE EDUCATION/TRAINING PROGRAM
Payer: MEDICARE

## 2021-10-30 ENCOUNTER — APPOINTMENT (OUTPATIENT)
Dept: GENERAL RADIOLOGY | Age: 31
End: 2021-10-30
Payer: MEDICARE

## 2021-10-30 VITALS
WEIGHT: 223.9 LBS | TEMPERATURE: 98.1 F | OXYGEN SATURATION: 96 % | SYSTOLIC BLOOD PRESSURE: 147 MMHG | BODY MASS INDEX: 33.16 KG/M2 | HEIGHT: 69 IN | RESPIRATION RATE: 16 BRPM | HEART RATE: 84 BPM | DIASTOLIC BLOOD PRESSURE: 82 MMHG

## 2021-10-30 DIAGNOSIS — R11.2 NON-INTRACTABLE VOMITING WITH NAUSEA, UNSPECIFIED VOMITING TYPE: Primary | ICD-10-CM

## 2021-10-30 DIAGNOSIS — R11.0 NAUSEA: ICD-10-CM

## 2021-10-30 DIAGNOSIS — F11.10 OPIOID ABUSE (HCC): ICD-10-CM

## 2021-10-30 DIAGNOSIS — R10.84 GENERALIZED ABDOMINAL PAIN: ICD-10-CM

## 2021-10-30 DIAGNOSIS — R45.851 SUICIDAL IDEATIONS: ICD-10-CM

## 2021-10-30 DIAGNOSIS — R45.850 HOMICIDAL IDEATIONS: ICD-10-CM

## 2021-10-30 DIAGNOSIS — G89.29 ACUTE EXACERBATION OF CHRONIC LOW BACK PAIN: Primary | ICD-10-CM

## 2021-10-30 DIAGNOSIS — M54.50 ACUTE EXACERBATION OF CHRONIC LOW BACK PAIN: Primary | ICD-10-CM

## 2021-10-30 PROBLEM — F25.9 SCHIZOAFFECTIVE DISORDER (HCC): Status: ACTIVE | Noted: 2021-10-30

## 2021-10-30 LAB
-: ABNORMAL
ABSOLUTE EOS #: 0.1 K/UL (ref 0–0.4)
ABSOLUTE IMMATURE GRANULOCYTE: ABNORMAL K/UL (ref 0–0.3)
ABSOLUTE LYMPH #: 2.3 K/UL (ref 1–4.8)
ABSOLUTE MONO #: 0.5 K/UL (ref 0.1–1.3)
ACETAMINOPHEN LEVEL: <5 UG/ML (ref 10–30)
AMORPHOUS: ABNORMAL
AMPHETAMINE SCREEN URINE: NEGATIVE
ANION GAP SERPL CALCULATED.3IONS-SCNC: 10 MMOL/L (ref 9–17)
BACTERIA: ABNORMAL
BARBITURATE SCREEN URINE: NEGATIVE
BASOPHILS # BLD: 1 % (ref 0–2)
BASOPHILS ABSOLUTE: 0.1 K/UL (ref 0–0.2)
BENZODIAZEPINE SCREEN, URINE: NEGATIVE
BILIRUBIN URINE: ABNORMAL
BUN BLDV-MCNC: 12 MG/DL (ref 6–20)
BUN/CREAT BLD: ABNORMAL (ref 9–20)
BUPRENORPHINE URINE: ABNORMAL
CALCIUM SERPL-MCNC: 8.8 MG/DL (ref 8.6–10.4)
CANNABINOID SCREEN URINE: NEGATIVE
CASTS UA: ABNORMAL /LPF
CHLORIDE BLD-SCNC: 104 MMOL/L (ref 98–107)
CO2: 24 MMOL/L (ref 20–31)
COCAINE METABOLITE, URINE: POSITIVE
COLOR: ABNORMAL
COMMENT UA: ABNORMAL
CREAT SERPL-MCNC: 0.81 MG/DL (ref 0.7–1.2)
CRYSTALS, UA: ABNORMAL /HPF
DIFFERENTIAL TYPE: ABNORMAL
EOSINOPHILS RELATIVE PERCENT: 2 % (ref 0–4)
EPITHELIAL CELLS UA: ABNORMAL /HPF
ETHANOL PERCENT: <0.01 %
ETHANOL: <10 MG/DL
GFR AFRICAN AMERICAN: >60 ML/MIN
GFR NON-AFRICAN AMERICAN: >60 ML/MIN
GFR SERPL CREATININE-BSD FRML MDRD: ABNORMAL ML/MIN/{1.73_M2}
GFR SERPL CREATININE-BSD FRML MDRD: ABNORMAL ML/MIN/{1.73_M2}
GLUCOSE BLD-MCNC: 156 MG/DL (ref 70–99)
GLUCOSE URINE: NEGATIVE
HCT VFR BLD CALC: 40.7 % (ref 41–53)
HEMOGLOBIN: 13.5 G/DL (ref 13.5–17.5)
IMMATURE GRANULOCYTES: ABNORMAL %
KETONES, URINE: ABNORMAL
LEUKOCYTE ESTERASE, URINE: NEGATIVE
LYMPHOCYTES # BLD: 34 % (ref 24–44)
MCH RBC QN AUTO: 29.5 PG (ref 26–34)
MCHC RBC AUTO-ENTMCNC: 33.2 G/DL (ref 31–37)
MCV RBC AUTO: 88.8 FL (ref 80–100)
MDMA URINE: ABNORMAL
METHADONE SCREEN, URINE: NEGATIVE
METHAMPHETAMINE, URINE: ABNORMAL
MONOCYTES # BLD: 7 % (ref 1–7)
MUCUS: ABNORMAL
NITRITE, URINE: NEGATIVE
NRBC AUTOMATED: ABNORMAL PER 100 WBC
OPIATES, URINE: NEGATIVE
OTHER OBSERVATIONS UA: ABNORMAL
OXYCODONE SCREEN URINE: NEGATIVE
PDW BLD-RTO: 13.3 % (ref 11.5–14.9)
PH UA: 5.5 (ref 5–8)
PHENCYCLIDINE, URINE: NEGATIVE
PLATELET # BLD: 275 K/UL (ref 150–450)
PLATELET ESTIMATE: ABNORMAL
PMV BLD AUTO: 7.6 FL (ref 6–12)
POTASSIUM SERPL-SCNC: 4.2 MMOL/L (ref 3.7–5.3)
PROPOXYPHENE, URINE: ABNORMAL
PROTEIN UA: NEGATIVE
RBC # BLD: 4.58 M/UL (ref 4.5–5.9)
RBC # BLD: ABNORMAL 10*6/UL
RBC UA: ABNORMAL /HPF
RENAL EPITHELIAL, UA: ABNORMAL /HPF
SALICYLATE LEVEL: <1 MG/DL (ref 3–10)
SARS-COV-2, RAPID: NOT DETECTED
SEG NEUTROPHILS: 56 % (ref 36–66)
SEGMENTED NEUTROPHILS ABSOLUTE COUNT: 3.8 K/UL (ref 1.3–9.1)
SODIUM BLD-SCNC: 138 MMOL/L (ref 135–144)
SPECIFIC GRAVITY UA: 1.04 (ref 1–1.03)
SPECIMEN DESCRIPTION: NORMAL
TEST INFORMATION: ABNORMAL
TOXIC TRICYCLIC SC,BLOOD: ABNORMAL
TRICHOMONAS: ABNORMAL
TRICYCLIC ANTIDEP,URINE: NEGATIVE
TRICYCLIC ANTIDEPRESSANTS, UR: ABNORMAL
TURBIDITY: CLEAR
URINE HGB: NEGATIVE
UROBILINOGEN, URINE: NORMAL
WBC # BLD: 6.7 K/UL (ref 3.5–11)
WBC # BLD: ABNORMAL 10*3/UL
WBC UA: ABNORMAL /HPF
YEAST: ABNORMAL

## 2021-10-30 PROCEDURE — 80179 DRUG ASSAY SALICYLATE: CPT

## 2021-10-30 PROCEDURE — G0480 DRUG TEST DEF 1-7 CLASSES: HCPCS

## 2021-10-30 PROCEDURE — 6360000002 HC RX W HCPCS: Performed by: PSYCHIATRY & NEUROLOGY

## 2021-10-30 PROCEDURE — 1240000000 HC EMOTIONAL WELLNESS R&B

## 2021-10-30 PROCEDURE — 6370000000 HC RX 637 (ALT 250 FOR IP): Performed by: STUDENT IN AN ORGANIZED HEALTH CARE EDUCATION/TRAINING PROGRAM

## 2021-10-30 PROCEDURE — 80307 DRUG TEST PRSMV CHEM ANLYZR: CPT

## 2021-10-30 PROCEDURE — 6360000002 HC RX W HCPCS: Performed by: STUDENT IN AN ORGANIZED HEALTH CARE EDUCATION/TRAINING PROGRAM

## 2021-10-30 PROCEDURE — 6370000000 HC RX 637 (ALT 250 FOR IP): Performed by: PSYCHIATRY & NEUROLOGY

## 2021-10-30 PROCEDURE — 87635 SARS-COV-2 COVID-19 AMP PRB: CPT

## 2021-10-30 PROCEDURE — 71045 X-RAY EXAM CHEST 1 VIEW: CPT

## 2021-10-30 PROCEDURE — APPSS60 APP SPLIT SHARED TIME 46-60 MINUTES: Performed by: PSYCHIATRY & NEUROLOGY

## 2021-10-30 PROCEDURE — 85025 COMPLETE CBC W/AUTO DIFF WBC: CPT

## 2021-10-30 PROCEDURE — 80143 DRUG ASSAY ACETAMINOPHEN: CPT

## 2021-10-30 PROCEDURE — 36415 COLL VENOUS BLD VENIPUNCTURE: CPT

## 2021-10-30 PROCEDURE — 99284 EMERGENCY DEPT VISIT MOD MDM: CPT

## 2021-10-30 PROCEDURE — 80048 BASIC METABOLIC PNL TOTAL CA: CPT

## 2021-10-30 PROCEDURE — 6370000000 HC RX 637 (ALT 250 FOR IP): Performed by: EMERGENCY MEDICINE

## 2021-10-30 PROCEDURE — 81001 URINALYSIS AUTO W/SCOPE: CPT

## 2021-10-30 PROCEDURE — 96372 THER/PROPH/DIAG INJ SC/IM: CPT

## 2021-10-30 RX ORDER — CLONIDINE HYDROCHLORIDE 0.1 MG/1
0.1 TABLET ORAL 3 TIMES DAILY
Status: DISCONTINUED | OUTPATIENT
Start: 2021-10-30 | End: 2021-11-02 | Stop reason: HOSPADM

## 2021-10-30 RX ORDER — SUCRALFATE 1 G/1
1 TABLET ORAL 4 TIMES DAILY
Status: DISCONTINUED | OUTPATIENT
Start: 2021-10-30 | End: 2021-11-02 | Stop reason: HOSPADM

## 2021-10-30 RX ORDER — QUETIAPINE FUMARATE 100 MG/1
100 TABLET, FILM COATED ORAL NIGHTLY
Status: DISCONTINUED | OUTPATIENT
Start: 2021-10-30 | End: 2021-11-02 | Stop reason: HOSPADM

## 2021-10-30 RX ORDER — MAGNESIUM HYDROXIDE/ALUMINUM HYDROXICE/SIMETHICONE 120; 1200; 1200 MG/30ML; MG/30ML; MG/30ML
5 SUSPENSION ORAL EVERY 6 HOURS PRN
Qty: 710 ML | Refills: 0 | Status: ON HOLD | OUTPATIENT
Start: 2021-10-30 | End: 2021-11-20

## 2021-10-30 RX ORDER — PROMETHAZINE HYDROCHLORIDE 25 MG/ML
25 INJECTION, SOLUTION INTRAMUSCULAR; INTRAVENOUS ONCE
Status: COMPLETED | OUTPATIENT
Start: 2021-10-30 | End: 2021-10-30

## 2021-10-30 RX ORDER — ACETAMINOPHEN 500 MG
1000 TABLET ORAL ONCE
Status: DISCONTINUED | OUTPATIENT
Start: 2021-10-30 | End: 2021-11-02 | Stop reason: HOSPADM

## 2021-10-30 RX ORDER — DIPHENHYDRAMINE HYDROCHLORIDE 50 MG/ML
50 INJECTION INTRAMUSCULAR; INTRAVENOUS EVERY 4 HOURS PRN
Status: DISCONTINUED | OUTPATIENT
Start: 2021-10-30 | End: 2021-11-02 | Stop reason: HOSPADM

## 2021-10-30 RX ORDER — DIPHENHYDRAMINE HYDROCHLORIDE 50 MG/ML
25 INJECTION INTRAMUSCULAR; INTRAVENOUS ONCE
Status: COMPLETED | OUTPATIENT
Start: 2021-10-30 | End: 2021-10-30

## 2021-10-30 RX ORDER — BUPRENORPHINE AND NALOXONE 8; 2 MG/1; MG/1
1 FILM, SOLUBLE BUCCAL; SUBLINGUAL DAILY
Status: DISCONTINUED | OUTPATIENT
Start: 2021-10-30 | End: 2021-10-31

## 2021-10-30 RX ORDER — OLANZAPINE 5 MG/1
5 TABLET ORAL EVERY 6 HOURS PRN
Status: DISCONTINUED | OUTPATIENT
Start: 2021-10-30 | End: 2021-10-30

## 2021-10-30 RX ORDER — POLYETHYLENE GLYCOL 3350 17 G/17G
17 POWDER, FOR SOLUTION ORAL DAILY PRN
Status: DISCONTINUED | OUTPATIENT
Start: 2021-10-30 | End: 2021-11-02 | Stop reason: HOSPADM

## 2021-10-30 RX ORDER — PANTOPRAZOLE SODIUM 40 MG/1
40 TABLET, DELAYED RELEASE ORAL
Status: DISCONTINUED | OUTPATIENT
Start: 2021-10-31 | End: 2021-11-02 | Stop reason: HOSPADM

## 2021-10-30 RX ORDER — TRAZODONE HYDROCHLORIDE 50 MG/1
50 TABLET ORAL NIGHTLY PRN
Status: DISCONTINUED | OUTPATIENT
Start: 2021-10-30 | End: 2021-11-02 | Stop reason: HOSPADM

## 2021-10-30 RX ORDER — ACETAMINOPHEN 500 MG
1000 TABLET ORAL ONCE
Status: COMPLETED | OUTPATIENT
Start: 2021-10-30 | End: 2021-10-30

## 2021-10-30 RX ORDER — NICOTINE 21 MG/24HR
1 PATCH, TRANSDERMAL 24 HOURS TRANSDERMAL DAILY
Status: DISCONTINUED | OUTPATIENT
Start: 2021-10-30 | End: 2021-10-30

## 2021-10-30 RX ORDER — HYDROXYZINE 50 MG/1
50 TABLET, FILM COATED ORAL 3 TIMES DAILY PRN
Status: DISCONTINUED | OUTPATIENT
Start: 2021-10-30 | End: 2021-11-02 | Stop reason: HOSPADM

## 2021-10-30 RX ORDER — M-VIT,TX,IRON,MINS/CALC/FOLIC 27MG-0.4MG
1 TABLET ORAL DAILY
Status: DISCONTINUED | OUTPATIENT
Start: 2021-10-30 | End: 2021-11-02 | Stop reason: HOSPADM

## 2021-10-30 RX ORDER — ONDANSETRON 4 MG/1
4 TABLET, ORALLY DISINTEGRATING ORAL ONCE
Status: DISCONTINUED | OUTPATIENT
Start: 2021-10-30 | End: 2021-11-02 | Stop reason: HOSPADM

## 2021-10-30 RX ORDER — LIDOCAINE 4 G/G
1 PATCH TOPICAL ONCE
Status: COMPLETED | OUTPATIENT
Start: 2021-10-30 | End: 2021-10-30

## 2021-10-30 RX ORDER — PROMETHAZINE HYDROCHLORIDE 12.5 MG/1
12.5 TABLET ORAL
Status: ACTIVE | OUTPATIENT
Start: 2021-10-30 | End: 2021-10-30

## 2021-10-30 RX ORDER — GABAPENTIN 600 MG/1
600 TABLET ORAL 3 TIMES DAILY
Status: DISCONTINUED | OUTPATIENT
Start: 2021-10-30 | End: 2021-11-02 | Stop reason: HOSPADM

## 2021-10-30 RX ORDER — BUTALBITAL, ACETAMINOPHEN AND CAFFEINE 300; 40; 50 MG/1; MG/1; MG/1
1 CAPSULE ORAL EVERY 4 HOURS PRN
Status: DISCONTINUED | OUTPATIENT
Start: 2021-10-30 | End: 2021-11-01

## 2021-10-30 RX ORDER — ACETAMINOPHEN 325 MG/1
650 TABLET ORAL EVERY 4 HOURS PRN
Status: DISCONTINUED | OUTPATIENT
Start: 2021-10-30 | End: 2021-11-02 | Stop reason: HOSPADM

## 2021-10-30 RX ORDER — TIZANIDINE 4 MG/1
4 TABLET ORAL 3 TIMES DAILY PRN
Status: DISCONTINUED | OUTPATIENT
Start: 2021-10-30 | End: 2021-11-01

## 2021-10-30 RX ORDER — PANTOPRAZOLE SODIUM 40 MG/1
40 TABLET, DELAYED RELEASE ORAL
Qty: 30 TABLET | Refills: 0 | Status: SHIPPED | OUTPATIENT
Start: 2021-10-30 | End: 2021-11-18 | Stop reason: SDUPTHER

## 2021-10-30 RX ORDER — GABAPENTIN 800 MG/1
800 TABLET ORAL 3 TIMES DAILY
Status: DISCONTINUED | OUTPATIENT
Start: 2021-10-30 | End: 2021-10-30

## 2021-10-30 RX ORDER — MAGNESIUM HYDROXIDE/ALUMINUM HYDROXICE/SIMETHICONE 120; 1200; 1200 MG/30ML; MG/30ML; MG/30ML
30 SUSPENSION ORAL EVERY 6 HOURS PRN
Status: DISCONTINUED | OUTPATIENT
Start: 2021-10-30 | End: 2021-11-02 | Stop reason: HOSPADM

## 2021-10-30 RX ORDER — OLANZAPINE 10 MG/1
5 INJECTION, POWDER, LYOPHILIZED, FOR SOLUTION INTRAMUSCULAR EVERY 6 HOURS PRN
Status: DISCONTINUED | OUTPATIENT
Start: 2021-10-30 | End: 2021-10-30

## 2021-10-30 RX ADMIN — BUPRENORPHINE AND NALOXONE 1 FILM: 8; 2 FILM BUCCAL; SUBLINGUAL at 17:47

## 2021-10-30 RX ADMIN — NICOTINE POLACRILEX 4 MG: 2 GUM, CHEWING BUCCAL at 22:53

## 2021-10-30 RX ADMIN — METFORMIN HYDROCHLORIDE 500 MG: 500 TABLET ORAL at 17:46

## 2021-10-30 RX ADMIN — NICOTINE POLACRILEX 2 MG: 2 GUM, CHEWING BUCCAL at 20:00

## 2021-10-30 RX ADMIN — TRAZODONE HYDROCHLORIDE 50 MG: 50 TABLET ORAL at 21:25

## 2021-10-30 RX ADMIN — ACETAMINOPHEN 1000 MG: 500 TABLET ORAL at 01:51

## 2021-10-30 RX ADMIN — MULTIPLE VITAMINS W/ MINERALS TAB 1 TABLET: TAB at 17:46

## 2021-10-30 RX ADMIN — NICOTINE POLACRILEX 4 MG: 2 GUM, CHEWING BUCCAL at 21:40

## 2021-10-30 RX ADMIN — SUCRALFATE 1 G: 1 TABLET ORAL at 21:24

## 2021-10-30 RX ADMIN — CLONIDINE HYDROCHLORIDE 0.1 MG: 0.1 TABLET ORAL at 21:24

## 2021-10-30 RX ADMIN — QUETIAPINE FUMARATE 100 MG: 100 TABLET ORAL at 21:25

## 2021-10-30 RX ADMIN — HYDROXYZINE HYDROCHLORIDE 50 MG: 50 TABLET, FILM COATED ORAL at 17:46

## 2021-10-30 RX ADMIN — DIPHENHYDRAMINE HYDROCHLORIDE 50 MG: 50 INJECTION INTRAMUSCULAR; INTRAVENOUS at 21:25

## 2021-10-30 RX ADMIN — TIZANIDINE 4 MG: 4 TABLET ORAL at 17:46

## 2021-10-30 RX ADMIN — DIPHENHYDRAMINE HYDROCHLORIDE 25 MG: 50 INJECTION INTRAMUSCULAR; INTRAVENOUS at 01:52

## 2021-10-30 RX ADMIN — PROMETHAZINE HYDROCHLORIDE 25 MG: 25 INJECTION INTRAMUSCULAR; INTRAVENOUS at 01:50

## 2021-10-30 RX ADMIN — GABAPENTIN 600 MG: 600 TABLET, FILM COATED ORAL at 21:24

## 2021-10-30 ASSESSMENT — SLEEP AND FATIGUE QUESTIONNAIRES
DIFFICULTY FALLING ASLEEP: YES
DO YOU HAVE DIFFICULTY SLEEPING: YES
SLEEP PATTERN: DIFFICULTY FALLING ASLEEP;DISTURBED/INTERRUPTED SLEEP
DO YOU USE A SLEEP AID: YES
RESTFUL SLEEP: NO
DIFFICULTY ARISING: YES
AVERAGE NUMBER OF SLEEP HOURS: 4
DIFFICULTY STAYING ASLEEP: YES

## 2021-10-30 ASSESSMENT — ENCOUNTER SYMPTOMS
EYE DISCHARGE: 0
BLOOD IN STOOL: 0
SORE THROAT: 0
ABDOMINAL PAIN: 1
SHORTNESS OF BREATH: 0
VOMITING: 1
DIARRHEA: 0
EYE REDNESS: 0
TROUBLE SWALLOWING: 0
CONSTIPATION: 0
BACK PAIN: 0
COLOR CHANGE: 0
COUGH: 0
BACK PAIN: 1
VOMITING: 1
NAUSEA: 1
COUGH: 0
NAUSEA: 1
ABDOMINAL PAIN: 0

## 2021-10-30 ASSESSMENT — PAIN DESCRIPTION - LOCATION
LOCATION: LEG
LOCATION: ABDOMEN
LOCATION: ABDOMEN;LEG

## 2021-10-30 ASSESSMENT — PAIN DESCRIPTION - ORIENTATION: ORIENTATION: RIGHT;UPPER

## 2021-10-30 ASSESSMENT — PAIN SCALES - GENERAL
PAINLEVEL_OUTOF10: 6
PAINLEVEL_OUTOF10: 8
PAINLEVEL_OUTOF10: 7

## 2021-10-30 ASSESSMENT — PATIENT HEALTH QUESTIONNAIRE - PHQ9: SUM OF ALL RESPONSES TO PHQ QUESTIONS 1-9: 18

## 2021-10-30 ASSESSMENT — PAIN DESCRIPTION - DESCRIPTORS: DESCRIPTORS: SHARP

## 2021-10-30 ASSESSMENT — LIFESTYLE VARIABLES: HISTORY_ALCOHOL_USE: NO

## 2021-10-30 ASSESSMENT — PAIN DESCRIPTION - FREQUENCY: FREQUENCY: INTERMITTENT

## 2021-10-30 ASSESSMENT — PAIN DESCRIPTION - PAIN TYPE: TYPE: OTHER (COMMENT)

## 2021-10-30 ASSESSMENT — PAIN DESCRIPTION - ONSET: ONSET: ON-GOING

## 2021-10-30 NOTE — BH NOTE
Patient given tobacco quitline number 55297130461 at this time, refusing to call at this time, states \" I just dont want to quit now\"- patient given information as to the dangers of long term tobacco use. Continue to reinforce the importance of tobacco cessation.

## 2021-10-30 NOTE — ED NOTES
[facility] access called to initiate case and to see about accepting blood work from [facility] 2 days ago and current urine and covid 19 collected today. Dr Edin Robin to admit and [facility] faxed bed request and voluntary to admission.

## 2021-10-30 NOTE — ED PROVIDER NOTES
[address]  EMERGENCY DEPARTMENT ENCOUNTER    Pt Name: Eagle Hicks  MRN: [id number]  YOB: 1990  Date of evaluation:10/30/21  PCP: Mayra Blizzard, APRN - CNP    CHIEF COMPLAINT       Chief Complaint   Patient presents with    Back Pain       HISTORY OF PRESENT ILLNESS    Eagle Hicks is a 32 y.o. male who presents with a chief complaint of sciatic pain. Patient states that he has been dealing with pain in his lower back that radiates to his right groin for the past several days. He states he has been in several different hospitals over the past several days but \"nobody will help me. \"  He states that the last hospital he was just focused on his nausea and vomiting. States he continues to be nauseous. He denies any falls or trauma. He states he has been taking his Suboxone and gabapentin at home with minimal relief. Patient states he feels very anxious and no one will help him with this. He is asking for \"anxiety medications. \"  He will not answer if he is suicidal or homicidal to me. Denies any illicit drug or alcohol use. Symptoms are acute. Symptoms are moderate. Nothing else make symptoms better or worse. Patient has no other complaints at this time. REVIEW OF SYSTEMS       Review of Systems   Constitutional: Negative for chills, fatigue and fever. HENT: Negative for congestion, ear pain, sore throat and trouble swallowing. Eyes: Negative for visual disturbance. Respiratory: Negative for cough and shortness of breath. Cardiovascular: Negative for chest pain, palpitations and leg swelling. Gastrointestinal: Positive for nausea and vomiting. Negative for abdominal pain, blood in stool, constipation and diarrhea. Genitourinary: Negative for dysuria and flank pain. Musculoskeletal: Positive for back pain. Negative for arthralgias, myalgias and neck pain. Skin: Negative for color change, rash and wound.    Neurological: Negative for dizziness, weakness, light-headedness, numbness and headaches. Psychiatric/Behavioral: Negative for confusion. The patient is nervous/anxious. All other systems reviewed and are negative. Negative in 10 essential Systems except as mentioned above and in the HPI. PAST MEDICAL HISTORY     Past Medical History:   Diagnosis Date    Anxiety     Arthritis     Asthma     Bipolar I disorder, most recent episode (or current) depressed, unspecified 9/12/2014    Clostridium difficile infection     COPD (chronic obstructive pulmonary disease) ([address]     Depression     Disease of blood and blood forming organ     Eczema     Fracture, metacarpal     R 4th and 5th    Gastric ulcer     Gastritis 06/13/2018    GERD (gastroesophageal reflux disease)     GI bleed     H. 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[address]     Substance abuse [facility]     Suicidal ideation     Suicide attempt by hanging ([facility]     Tobacco dependence     Ulcerative colitis ([facility]     UTI (urinary tract infection)          SURGICAL HISTORY      has a past surgical history that includes Colonoscopy; bronchoscopy; other surgical history; Upper gastrointestinal endoscopy (2/4/16); pr egd transoral biopsy single/multiple (N/A, 3/20/2017); sigmoidoscopy (N/A, 3/20/2017); Cholecystectomy, laparoscopic (07/14/2017); pr esophagogastroduodenoscopy transoral diagnostic (N/A, 8/9/2017); pr colonoscopy w/biopsy single/multiple (8/9/2017); Abdomen surgery; Upper gastrointestinal endoscopy (N/A, 6/13/2018); Upper gastrointestinal endoscopy (N/A, 9/20/2018); and Endoscopy, colon, diagnostic.       CURRENT MEDICATIONS       Previous Medications    ALUMINUM & MAGNESIUM HYDROXIDE-SIMETHICONE (MAALOX) 392-189-84 MG/5ML SUSP SUSPENSION    Take 5 mLs by mouth every 6 hours as needed for Indigestion    ARIPIPRAZOLE (ABILIFY) 15 MG TABLET    Take 1 tablet by mouth daily    CLONIDINE (CATAPRES) 0.1 MG TABLET    Take 1 tablet by mouth 3 times daily    DIPHENHYDRAMINE (BENADRYL) 25 MG TABLET    Take 25 mg by mouth 2 times daily     DIPHENHYDRAMINE-ZINC ACETATE (BENADRYL ITCH RELIEF) 2-0.1 % STCK    Apply 1 each topically 2 times daily    GABAPENTIN (NEURONTIN) 600 MG TABLET    Take 1 tablet by mouth 4 times daily for 30 days. GABAPENTIN (NEURONTIN) 800 MG TABLET    Take 1 tablet by mouth 3 times daily for 30 days. HYDROXYZINE (ATARAX) 50 MG TABLET    Take 50 mg by mouth 2 times daily    METFORMIN (GLUCOPHAGE) 500 MG TABLET    Take 1 tablet by mouth 2 times daily (with meals)    MULTIPLE VITAMINS-MINERALS (THERAPEUTIC MULTIVITAMIN-MINERALS) TABLET    Take 1 tablet by mouth daily    ONDANSETRON (ZOFRAN ODT) 4 MG DISINTEGRATING TABLET    Take 1 tablet by mouth every 8 hours as needed for Nausea    PANTOPRAZOLE (PROTONIX) 40 MG TABLET    Take 1 tablet by mouth every morning (before breakfast)    QUETIAPINE (SEROQUEL) 100 MG TABLET    Take 1 tablet by mouth nightly    SUCRALFATE (CARAFATE) 1 GM TABLET    Take 1 tablet by mouth 4 times daily    SUVOREXANT (BELSOMRA) 20 MG TABS    Take 1 tablet by mouth nightly for 30 days. TIZANIDINE (ZANAFLEX) 4 MG TABLET    Take 1 tablet by mouth 3 times daily as needed (SCIATICA)    TRAZODONE (DESYREL) 150 MG TABLET    Take 1 tablet by mouth nightly       ALLERGIES     is allergic to dicyclomine, famotidine, geodon [ziprasidone hcl], haloperidol, iv dye [iodides], ibuprofen, aspirin, dicyclomine hcl, flagyl [metronidazole], iodine, and mirtazapine. FAMILY HISTORY     He indicated that his mother is alive. He indicated that his father is alive. He indicated that the status of his sister is unknown. He indicated that the status of his other is unknown.  He indicated that the status of his paternal cousin is unknown.     family history includes Alcohol Abuse in his father; Arthritis in his father and mother; Colon Cancer (age of onset: 43) in his paternal cousin; Depression in his brother and father; Diabetes in his brother and father; High Blood Pressure in his father; Mental Illness in his brother; Migraines in his brother, father, mother, and sister; Other in his brother, brother, father, mother, and sister; Stroke in an other family member. SOCIAL HISTORY      reports that he has been smoking cigarettes. He has a 7.50 pack-year smoking history. He has never used smokeless tobacco. He reports previous alcohol use. He reports previous drug use. Drug: Opiates . PHYSICAL EXAM     INITIAL VITALS:  vitals were not taken for this visit. Physical Exam  Vitals and nursing note reviewed. Constitutional:       General: He is not in acute distress. HENT:      Head: Normocephalic and atraumatic. Eyes:      Conjunctiva/sclera: Conjunctivae normal.      Pupils: Pupils are equal, round, and reactive to light. Cardiovascular:      Rate and Rhythm: Normal rate and regular rhythm. Heart sounds: Normal heart sounds. No murmur heard. No friction rub. Pulmonary:      Effort: Pulmonary effort is normal. No respiratory distress. Breath sounds: Normal breath sounds. Abdominal:      General: Bowel sounds are normal. There is no distension. Palpations: Abdomen is soft. Tenderness: There is no abdominal tenderness. Musculoskeletal:         General: No tenderness. Cervical back: Neck supple. Lymphadenopathy:      Cervical: No cervical adenopathy. Skin:     General: Skin is warm and dry. Findings: No rash. Neurological:      General: No focal deficit present. Mental Status: He is alert and oriented to person, place, and time. Sensory: No sensory deficit. Motor: No weakness.       Gait: Gait normal.   Psychiatric:         Judgment: chronic low back pain    2. Suicidal ideations    3. Homicidal ideations    4. Nausea            DISPOSITION/PLAN   DISPOSITION            PATIENT REFERRED TO:  No follow-up provider specified. DISCHARGE MEDICATIONS:  New Prescriptions    No medications on file       The care is provided during an unprecedented national emergency due to the novel coronavirus, COVID-19.     (Please note that portions ofthis note were completed with a voice recognition program.  Efforts were made to edit the dictations but occasionally words are mis-transcribed.)    Karey Banerjee DO  Attending Emergency Physician          Karey Banerjee DO  10/30/21 [id number]

## 2021-10-30 NOTE — BH NOTE
Made several attempts over several hours to verify home medications with [facility] in [facility]. Talked with  and she stated that pharmacy was very short staffed and had only one pharmacist working. Unable to talk with pharmacist to verify meds.

## 2021-10-30 NOTE — ED PROVIDER NOTES
[facility]  Emergency Department Encounter     Pt Name: John Parker  MRN: [id number]  [place] 1990  Date of evaluation: 10/30/21  PCP:  MASSIEL Garcia CNP    CHIEF COMPLAINT       Chief Complaint   Patient presents with    Emesis       HISTORY Reed  (Location/Symptom, Timing/Onset, Context/Setting, Quality, Duration, Modifying Factors,Severity.)      John Parker is a 32 y.o. male who presents with nausea, vomiting, abdominal pain. States his pain is intermittent. Worsening. Ongoing for the past 4 days. Initially denied any recent hospital visits for complaints however upon record review has been to both [facility] as well as [facility] in the past 4 days. Pain is throbbing. Entire abdomen. Worse with palpation or movement. PAST MEDICAL / SURGICAL / SOCIAL / FAMILY HISTORY      has a past medical history of Anxiety, Arthritis, Asthma, Bipolar I disorder, most recent episode (or current) depressed, unspecified, Clostridium difficile infection, COPD (chronic obstructive pulmonary disease) ([address] Depression, Disease of blood and blood forming organ, Eczema, Fracture, metacarpal, Gastric ulcer, Gastritis, GERD (gastroesophageal reflux disease), GI bleed, H. pylori infection, H/O blood clots, Head injury, Headache, Insomnia, Juvenile rheumatoid arthritis ([address] Neuromuscular disorder ([address] PFAPA syndrome ([address] PUD (peptic ulcer disease), Rheumatoid arthritis ([address] Rheumatoid arthritis(714.0), Sleep apnea, 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[address] Substance abuse ([address] Suicidal ideation, Suicide attempt by hanging ([address] Tobacco dependence, Ulcerative colitis ([address] and UTI (urinary tract infection). has a past surgical history that includes Colonoscopy; bronchoscopy; other surgical history; Upper gastrointestinal endoscopy (2/4/16); pr egd transoral biopsy single/multiple (N/A, 3/20/2017); sigmoidoscopy (N/A, 3/20/2017);  Cholecystectomy, laparoscopic (07/14/2017); pr esophagogastroduodenoscopy transoral diagnostic (N/A, 8/9/2017); pr colonoscopy w/biopsy single/multiple (8/9/2017); Abdomen surgery; Upper gastrointestinal endoscopy (N/A, 6/13/2018); Upper gastrointestinal endoscopy (N/A, 9/20/2018); and Endoscopy, colon, diagnostic. Social History     Socioeconomic History    Marital status: Single     Spouse name: Not on file    Number of children: Not on file    Years of education: Not on file    Highest education level: Not on file   Occupational History    Occupation: disability   Tobacco Use    Smoking status: Current Every Day Smoker     Packs/day: 0.50     Years: 15.00     Pack years: 7.50     Types: Cigarettes    Smokeless tobacco: Never Used   Vaping Use    Vaping Use: Former   Substance and Sexual Activity    Alcohol use: Not Currently     Comment: social ETOH use    Drug use: Not Currently     Types: Opiates      Comment: last used pain pill/fentanyl 2/18/21    Sexual activity: Yes     Partners: Female     Comment: Lives alone, not working. Other Topics Concern    Not on file   Social History Narrative    ** Merged History Encounter **          Social Determinants of Health     Financial Resource Strain: Unknown    Difficulty of Paying Living Expenses: Patient refused   Food Insecurity: Unknown    Worried About Running Out of Food in the Last Year: Patient refused    [address] in the Last Year: Patient refused   Transportation Needs:     Lack of Transportation (Medical):      Lack of Transportation (Non-Medical):    Physical Activity:     Days of Exercise per Week:     Minutes of Exercise per Session:    Stress:     Feeling of Stress :    Social Connections:     Frequency of Communication with Friends and Family:     Frequency of Social Gatherings with Friends and Family:     Attends Taoism Services:     Active Member of Clubs or Organizations:     Attends Club or Organization Meetings:     Marital Status: Intimate Partner Violence:     Fear of Current or Ex-Partner:     Emotionally Abused:     Physically Abused:     Sexually Abused:        Family History   Problem Relation Age of Onset    Diabetes Father     Alcohol Abuse Father     Depression Father     Arthritis Father     High Blood Pressure Father     Other Father         aneurysm & epilepsy    Migraines Father     Arthritis Mother     Other Mother         aneurysm & epilepsy    Migraines Mother     Diabetes Brother         Aunt and uncles    Depression Brother     Mental Illness Brother     Other Brother         epilepsy    Migraines Brother     Stroke Other         Uncle    Other Brother         murdered Oct 6th, 2014    Colon Cancer Paternal Cousin 43    Other Sister         epilepsy   Danielle Migraines Sister        Allergies:  Dicyclomine, Famotidine, Geodon [ziprasidone hcl], Haloperidol, Iv dye [iodides], Ibuprofen, Aspirin, Dicyclomine hcl, Flagyl [metronidazole], Iodine, and Mirtazapine    Home Medications:  Prior to Admission medications    Medication Sig Start Date End Date Taking? Authorizing Provider   pantoprazole (PROTONIX) 40 MG tablet Take 1 tablet by mouth every morning (before breakfast) 10/30/21  Yes Clementina Gabriel DO   aluminum & magnesium hydroxide-simethicone (MAALOX) 200-200-20 MG/5ML SUSP suspension Take 5 mLs by mouth every 6 hours as needed for Indigestion 10/30/21  Yes Clementina Gabriel DO   Suvorexant (BELSOMRA) 20 MG TABS Take 1 tablet by mouth nightly for 30 days. 10/7/21 11/6/21 Yes MASSIEL Mukherjee CNP   tiZANidine (ZANAFLEX) 4 MG tablet Take 1 tablet by mouth 3 times daily as needed (SCIATICA) 10/7/21  Yes MASSIEL Mukherjee CNP   cloNIDine (CATAPRES) 0.1 MG tablet Take 1 tablet by mouth 3 times daily 10/7/21  Yes MASSIEL Mukherjee CNP   gabapentin (NEURONTIN) 600 MG tablet Take 1 tablet by mouth 4 times daily for 30 days.  10/7/21 11/6/21 Yes MASSIEL Mukherjee CNP diphenhydrAMINE-Zinc Acetate (BENADRYL ITCH RELIEF) 2-0.1 % STCK Apply 1 each topically 2 times daily 9/19/21  Yes Jake Paez, DO   ondansetron (ZOFRAN ODT) 4 MG disintegrating tablet Take 1 tablet by mouth every 8 hours as needed for Nausea 8/23/21  Yes Bertha Alvarado, DO   sucralfate (CARAFATE) 1 GM tablet Take 1 tablet by mouth 4 times daily 8/23/21  Yes Bertha Alvarado, DO   diphenhydrAMINE (BENADRYL) 25 MG tablet Take 25 mg by mouth 2 times daily    Yes Historical Provider, MD   hydrOXYzine (ATARAX) 50 MG tablet Take 50 mg by mouth 2 times daily   Yes Historical Provider, MD   QUEtiapine (SEROQUEL) 100 MG tablet Take 1 tablet by mouth nightly 8/13/21  Yes MASSIEL Kirby CNP   ARIPiprazole (ABILIFY) 15 MG tablet Take 1 tablet by mouth daily 8/13/21  Yes MASSIEL Kirby CNP   metFORMIN (GLUCOPHAGE) 500 MG tablet Take 1 tablet by mouth 2 times daily (with meals) 7/26/21  Yes Nadia Fall MD   Multiple Vitamins-Minerals (THERAPEUTIC MULTIVITAMIN-MINERALS) tablet Take 1 tablet by mouth daily 7/26/21  Yes Nadia Fall MD   traZODone (DESYREL) 150 MG tablet Take 1 tablet by mouth nightly 7/26/21  Yes Nadia Fall MD   gabapentin (NEURONTIN) 800 MG tablet Take 1 tablet by mouth 3 times daily for 30 days. 9/24/21 10/24/21  MASSIEL Kirby CNP   meloxicam (MOBIC) 7.5 MG tablet Take 1 tablet by mouth 2 times daily as needed for Pain 8/19/21 10/30/21  MASSIEL Kirby CNP       REVIEW OF SYSTEMS    (2-9 systems for level 4, 10 or more for level 5)      Review of Systems   Constitutional: Negative for chills and fever. Eyes: Negative for discharge and redness. Respiratory: Negative for cough. Cardiovascular: Negative for leg swelling. Gastrointestinal: Positive for abdominal pain, nausea and vomiting. Genitourinary: Negative for flank pain. Musculoskeletal: Negative for back pain. Skin: Negative for rash.    Allergic/Immunologic: Positive for PORTABLE       MEDICATIONS ORDERED:  Orders Placed This Encounter   Medications    promethazine (PHENERGAN) injection 25 mg    acetaminophen (TYLENOL) tablet 1,000 mg    diphenhydrAMINE (BENADRYL) injection 25 mg    pantoprazole (PROTONIX) 40 MG tablet     Sig: Take 1 tablet by mouth every morning (before breakfast)     Dispense:  30 tablet     Refill:  0    aluminum & magnesium hydroxide-simethicone (MAALOX) 200-200-20 MG/5ML SUSP suspension     Sig: Take 5 mLs by mouth every 6 hours as needed for Indigestion     Dispense:  710 mL     Refill:  0       DDX: GERD versus malingering versus chronic abdominal pain versus cyclic vomiting    Initial MDM/Plan: 32 y.o. male who presents with reported nausea, vomiting, abdominal pain. Upon record review patient was seen at [facility] 1 and half days ago. Labs were performed. All labs reviewed extensive work-up all negative. Low suspicion for appendicitis or any other acute intra-abdominal process with normal vital signs, nonfebrile. Upon chart review patient has had multiple visits for abdominal pain previously. Will get chest x-ray to rule out any large perforation or pneumomediastinum patient states he has been nauseous and vomiting. Treat symptomatically with Phenergan Tylenol. Patient with multiple allergies to Pepcid, Bentyl, Haldol. Low follow-up with GI. DIAGNOSTIC RESULTS / EMERGENCY DEPARTMENT COURSE / MDM     LABS:  Labs Reviewed - No data to display      RADIOLOGY:  XR CHEST PORTABLE    Result Date: 10/30/2021  EXAMINATION: ONE XRAY VIEW OF THE CHEST 10/30/2021 1:34 am COMPARISON: 09/19/2020 HISTORY: ORDERING SYSTEM PROVIDED HISTORY: Epigastric pain TECHNOLOGIST PROVIDED HISTORY: Epigastric pain Reason for Exam: epigastric pain Acuity: Acute Type of Exam: Initial FINDINGS: Heart size and configuration are normal.  Hilar and mediastinal structures are unremarkable. The lungs are clear. No pneumothorax or pleural fluid.  No acute bone finding. No acute cardiopulmonary disease. EMERGENCY DEPARTMENT COURSE:  Patient reassessed resting comfortably sleeping with headphones in. Stated his nausea is improved. Discussed with him negative labs performed 2 days ago. Encouraged him to follow-up closely with primary care doctor as well as may benefit from GI evaluation. Voiced understanding plan. Low suspicion for any acute intra-abdominal process with normal vital signs as well as recent negative labs. · Based on the low acuity of concerning symptoms and improvement of symptoms, patient will be discharged with follow up and prescription information listed in the Disposition section. · Patient states they will follow-up with primary care physician and/or return to the emergency department should they experience a change or worsening of symptoms. · Patient will be discharged with resources: summary of visit, instructions, follow-up information, prescriptions if necessary. · Patient/ family instructed to read discharge paperwork. All of their questions and concerns were addressed. · Suspicion for any acute life-threatening processes is low. Patient voices understanding of plan. PROCEDURES:  None    CONSULTS:  None    CRITICAL CARE:  0    FINAL IMPRESSION      1. Non-intractable vomiting with nausea, unspecified vomiting type    2.  Generalized abdominal pain          DISPOSITION / PLAN     DISPOSITION Decision To Discharge 10/30/2021 02:19:56 AM        PATIENTREFERRED TO:  Alex Wadsworth, APRN - CNP  [address] [place] [address]    Schedule an appointment as soon as possible for a visit in 1 week  As needed      DISCHARGE MEDICATIONS:  Discharge Medication List as of 10/30/2021  2:22 AM      START taking these medications    Details   aluminum & magnesium hydroxide-simethicone (MAALOX) 200-200-20 MG/5ML SUSP suspension Take 5 mLs by mouth every 6 hours as needed for Indigestion, Disp-710 mL, R-0Print             Katie Kolb DO  EmergencyMedicine Attending    (Please note that portions of this note were completed with a voice recognition program.  Efforts were made to edit the dictations but occasionally words are mis-transcribed.)       Katie Kolb DO  10/30/21 [id number]

## 2021-10-30 NOTE — BH NOTE
[facility]  Admission Note     Admission Type:   Admission Type: Voluntary    Reason for admission:  Reason for Admission: having thoughts of suicide    PATIENT STRENGTHS:  Strengths: No significant Physical Illness, Positive Support    Patient Strengths and Limitations:  Limitations: Multiple barriers to leisure interests, Inappropriate/potentially harmful leisure interests, Difficult relationships / poor social skills, Difficulty problem solving/relies on others to help solve problems, Perceives need for assistance with self-care    Addictive Behavior:   Addictive Behavior  In the past 3 months, have you felt or has someone told you that you have a problem with:  : None  Do you have a history of Chemical Use?: No  Do you have a history of Alcohol Use?: No  Do you have a history of Street Drug Abuse?: No  Histroy of Prescripton Drug Abuse?: No    Medical Problems:   Past Medical History:   Diagnosis Date    Anxiety     Arthritis     Asthma     Bipolar I disorder, most recent episode (or current) depressed, unspecified 9/12/2014    Clostridium difficile infection     COPD (chronic obstructive pulmonary disease) ([address]     Depression     Disease of blood and blood forming organ     Eczema     Fracture, metacarpal     R 4th and 5th    Gastric ulcer     Gastritis 06/13/2018    GERD (gastroesophageal reflux disease)     GI bleed     H. 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[address]     Substance abuse ([address]     Suicidal ideation     Suicide attempt by hanging ([address]     Tobacco dependence     Ulcerative colitis ([address]     UTI (urinary tract infection)        Status EXAM:  Status and Exam  Normal: No  Facial Expression: Flat, Sad  Affect: Appropriate  Level of Consciousness: Alert  Mood:Normal: No  Mood: Depressed, Anxious  Motor Activity:Normal: No  Motor Activity: Decreased  Interview Behavior: Cooperative, Evasive  Preception: [place] to Person, Tracy Rolf to Time, [place] to Place, [place] to Situation  Attention:Normal: No  Attention: Distractible  Thought Processes: Circumstantial  Thought Content:Normal: No  Thought Content: Preoccupations, Poverty of Content  Hallucinations: None  Delusions: No  Memory:Normal: No  Memory: Poor Recent  Insight and Judgment: No  Insight and Judgment: Poor Insight, Poor Judgment  Present Suicidal Ideation: No  Present Homicidal Ideation: Yes (Feeling like hurting someone who was being disrespectful towards brother dying)    Tobacco Screening:  Practical Counseling, on admission, reuben X, if applicable and completed (first 3 are required if patient doesn't refuse):            ( )  Recognizing danger situations (included triggers and roadblocks)                    ( )  Coping skills (new ways to manage stress, exercise, relaxation techniques, changing routine, distraction)                                                           ( )  Basic information about quitting (benefits of quitting, techniques in how to quit, available resources  ( ) Referral for counseling faxed to [facility]                                           (X ) Patient refused counseling  ( ) Patient has not smoked in the last 30 days    Metabolic Screening:    Lab Results   Component Value Date    LABA1C 5.7 10/23/2014       Lab Results   Component Value Date    CHOL 205 (H) 02/21/2017    CHOL 158 11/09/2015    CHOL 152 10/23/2014    CHOL 206 (H) 03/05/2014    CHOL 205 (H) 01/22/2014    CHOL 208 (H) 08/28/2013     Lab Results   Component Value Date    TRIG 189 (H) 02/21/2017    TRIG 223 (H) 11/09/2015    TRIG 52 10/23/2014    TRIG 104 03/05/2014    TRIG 74 01/22/2014    TRIG 104 08/28/2013     Lab Results Component Value Date    HDL 44 2017    HDL 40 (L) 2015    HDL 55 10/23/2014    HDL 54 2014    HDL 53 2014    HDL 72 (H) 2013     No components found for: LDLCAL  No results found for: LABVLDL      Body mass index is 36.92 kg/m². BP Readings from Last 2 Encounters:   10/30/21 116/70   10/30/21 (!) 147/82           Pt admitted with followings belongings:  Dentures: None  Vision - Corrective Lenses: None  Hearing Aid: None  Jewelry: Other (Comment) (silver/toned chain)  Body Piercings Removed: N/A  Clothing: Socks, Pants, Shirt, Jacket / coat, Footwear  Other Valuables: Wallet, Other (Comment), Cell phone     Patient's home medications were reviewed and attempted to verify with [facility] pharmacy in [facility] but pharmacy did not answer phone after several hours of attempts. Patient oriented to surroundings and program expectations and copy of patient rights given. Received admission packet:  yes. Consents reviewed, signed yes. Refused no. Patient verbalize understanding:  yes. Patient education on precautions: yes    Pt wanded and searched. Pt offered opportunity to get numbers out of phone but stated that he did not want to do this because the thought he knew all the numbers already. Pt denied current suicidal ideations. He said that he has having thoughts of harming a person who was talking disrespectfully about his brother who had . He would not state person's name. Pt denied auditory/ visual hallucinations. He admitted to anxiety and said that he has not been sleeping good. He said that he would like to go to a treatment center for alcohol. He denied drug use and said that he had been in a rehab center in [place] but that they got closed down by the I.                   Carolyn Arguello RN
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
## 2021-10-31 PROCEDURE — 99223 1ST HOSP IP/OBS HIGH 75: CPT | Performed by: PSYCHIATRY & NEUROLOGY

## 2021-10-31 PROCEDURE — 6370000000 HC RX 637 (ALT 250 FOR IP): Performed by: PSYCHIATRY & NEUROLOGY

## 2021-10-31 PROCEDURE — 6360000002 HC RX W HCPCS: Performed by: PSYCHIATRY & NEUROLOGY

## 2021-10-31 PROCEDURE — 1240000000 HC EMOTIONAL WELLNESS R&B

## 2021-10-31 RX ORDER — BUPRENORPHINE AND NALOXONE 8; 2 MG/1; MG/1
1 FILM, SOLUBLE BUCCAL; SUBLINGUAL ONCE
Status: COMPLETED | OUTPATIENT
Start: 2021-10-31 | End: 2021-10-31

## 2021-10-31 RX ORDER — METOCLOPRAMIDE 10 MG/1
10 TABLET ORAL 3 TIMES DAILY PRN
Status: DISCONTINUED | OUTPATIENT
Start: 2021-10-31 | End: 2021-10-31

## 2021-10-31 RX ORDER — ONDANSETRON 4 MG/1
4 TABLET, FILM COATED ORAL EVERY 8 HOURS PRN
Status: DISCONTINUED | OUTPATIENT
Start: 2021-10-31 | End: 2021-11-02 | Stop reason: HOSPADM

## 2021-10-31 RX ORDER — MECLIZINE HCL 12.5 MG/1
12.5 TABLET ORAL 3 TIMES DAILY PRN
Status: DISCONTINUED | OUTPATIENT
Start: 2021-10-31 | End: 2021-11-02 | Stop reason: HOSPADM

## 2021-10-31 RX ORDER — POLYETHYLENE GLYCOL 3350 17 G
2 POWDER IN PACKET (EA) ORAL
Status: DISCONTINUED | OUTPATIENT
Start: 2021-10-31 | End: 2021-11-02 | Stop reason: HOSPADM

## 2021-10-31 RX ORDER — BUPRENORPHINE AND NALOXONE 8; 2 MG/1; MG/1
2 FILM, SOLUBLE BUCCAL; SUBLINGUAL DAILY
Status: DISCONTINUED | OUTPATIENT
Start: 2021-11-01 | End: 2021-11-02 | Stop reason: HOSPADM

## 2021-10-31 RX ADMIN — DIPHENHYDRAMINE HYDROCHLORIDE 50 MG: 50 INJECTION INTRAMUSCULAR; INTRAVENOUS at 20:15

## 2021-10-31 RX ADMIN — SUCRALFATE 1 G: 1 TABLET ORAL at 16:18

## 2021-10-31 RX ADMIN — DIPHENHYDRAMINE HYDROCHLORIDE 50 MG: 50 INJECTION INTRAMUSCULAR; INTRAVENOUS at 12:02

## 2021-10-31 RX ADMIN — NICOTINE POLACRILEX 4 MG: 2 GUM, CHEWING BUCCAL at 14:04

## 2021-10-31 RX ADMIN — HYDROXYZINE HYDROCHLORIDE 50 MG: 50 TABLET, FILM COATED ORAL at 19:23

## 2021-10-31 RX ADMIN — NICOTINE POLACRILEX 2 MG: 2 LOZENGE ORAL at 19:21

## 2021-10-31 RX ADMIN — SUCRALFATE 1 G: 1 TABLET ORAL at 20:15

## 2021-10-31 RX ADMIN — GABAPENTIN 600 MG: 600 TABLET, FILM COATED ORAL at 20:15

## 2021-10-31 RX ADMIN — CLONIDINE HYDROCHLORIDE 0.1 MG: 0.1 TABLET ORAL at 20:15

## 2021-10-31 RX ADMIN — NICOTINE POLACRILEX 4 MG: 2 GUM, CHEWING BUCCAL at 11:25

## 2021-10-31 RX ADMIN — GABAPENTIN 600 MG: 600 TABLET, FILM COATED ORAL at 14:04

## 2021-10-31 RX ADMIN — TIZANIDINE 4 MG: 4 TABLET ORAL at 10:31

## 2021-10-31 RX ADMIN — NICOTINE POLACRILEX 2 MG: 2 LOZENGE ORAL at 14:41

## 2021-10-31 RX ADMIN — PANTOPRAZOLE SODIUM 40 MG: 40 TABLET, DELAYED RELEASE ORAL at 07:59

## 2021-10-31 RX ADMIN — DIPHENHYDRAMINE HYDROCHLORIDE 50 MG: 50 INJECTION INTRAMUSCULAR; INTRAVENOUS at 07:59

## 2021-10-31 RX ADMIN — MULTIPLE VITAMINS W/ MINERALS TAB 1 TABLET: TAB at 07:58

## 2021-10-31 RX ADMIN — SUCRALFATE 1 G: 1 TABLET ORAL at 07:58

## 2021-10-31 RX ADMIN — DIPHENHYDRAMINE HYDROCHLORIDE 50 MG: 50 INJECTION INTRAMUSCULAR; INTRAVENOUS at 16:19

## 2021-10-31 RX ADMIN — TIZANIDINE 4 MG: 4 TABLET ORAL at 20:15

## 2021-10-31 RX ADMIN — NICOTINE POLACRILEX 4 MG: 2 GUM, CHEWING BUCCAL at 07:59

## 2021-10-31 RX ADMIN — NICOTINE POLACRILEX 2 MG: 2 LOZENGE ORAL at 16:19

## 2021-10-31 RX ADMIN — NICOTINE POLACRILEX 4 MG: 2 GUM, CHEWING BUCCAL at 10:29

## 2021-10-31 RX ADMIN — CLONIDINE HYDROCHLORIDE 0.1 MG: 0.1 TABLET ORAL at 14:05

## 2021-10-31 RX ADMIN — NICOTINE POLACRILEX 2 MG: 2 LOZENGE ORAL at 21:26

## 2021-10-31 RX ADMIN — NICOTINE POLACRILEX 2 MG: 2 LOZENGE ORAL at 17:18

## 2021-10-31 RX ADMIN — METFORMIN HYDROCHLORIDE 500 MG: 500 TABLET ORAL at 16:19

## 2021-10-31 RX ADMIN — TIZANIDINE 4 MG: 4 TABLET ORAL at 16:18

## 2021-10-31 RX ADMIN — BUPRENORPHINE AND NALOXONE 1 FILM: 8; 2 FILM BUCCAL; SUBLINGUAL at 07:59

## 2021-10-31 RX ADMIN — GABAPENTIN 600 MG: 600 TABLET, FILM COATED ORAL at 07:59

## 2021-10-31 RX ADMIN — HYDROXYZINE HYDROCHLORIDE 50 MG: 50 TABLET, FILM COATED ORAL at 11:23

## 2021-10-31 RX ADMIN — TRAZODONE HYDROCHLORIDE 50 MG: 50 TABLET ORAL at 20:15

## 2021-10-31 RX ADMIN — QUETIAPINE FUMARATE 100 MG: 100 TABLET ORAL at 20:15

## 2021-10-31 RX ADMIN — METFORMIN HYDROCHLORIDE 500 MG: 500 TABLET ORAL at 07:58

## 2021-10-31 RX ADMIN — BUPRENORPHINE AND NALOXONE 1 FILM: 8; 2 FILM BUCCAL; SUBLINGUAL at 14:04

## 2021-10-31 ASSESSMENT — ENCOUNTER SYMPTOMS
BACK PAIN: 0
SHORTNESS OF BREATH: 0
COUGH: 0
ABDOMINAL PAIN: 0
VOMITING: 0
NAUSEA: 0
DIARRHEA: 0

## 2021-10-31 ASSESSMENT — PAIN SCALES - GENERAL: PAINLEVEL_OUTOF10: 8

## 2021-10-31 ASSESSMENT — LIFESTYLE VARIABLES: HISTORY_ALCOHOL_USE: NO

## 2021-10-31 ASSESSMENT — PAIN DESCRIPTION - LOCATION: LOCATION: HEAD

## 2021-10-31 NOTE — CARE COORDINATION
Patient stated he talked to someone at   [address] [facility] in [facility] and said they will have a bed available tomorrow. SW called and left a voice message as it is Sunday and most treatment facilities are not open for admissions on the weekends.

## 2021-10-31 NOTE — PLAN OF CARE
[facility]  Initial Interdisciplinary Treatment Plan NO      Original treatment plan Date & Time: 10/31/21 0837    Admission Type:  Admission Type: Voluntary    Reason for admission:   Reason for Admission: having thoughts of suicide    Estimated Length of Stay:  5-7days  Estimated Discharge Date: to be determined by physician    PATIENT STRENGTHS:  Patient Strengths:Strengths: No significant Physical Illness, Positive Support  Patient Strengths and Limitations:Limitations: Multiple barriers to leisure interests, Inappropriate/potentially harmful leisure interests, Difficult relationships / poor social skills, Difficulty problem solving/relies on others to help solve problems, Perceives need for assistance with self-care  Addictive Behavior: Addictive Behavior  In the past 3 months, have you felt or has someone told you that you have a problem with:  : None  Do you have a history of Chemical Use?: No  Do you have a history of Alcohol Use?: No  Do you have a history of Street Drug Abuse?: No  Histroy of Prescripton Drug Abuse?: No  Medical Problems:  Past Medical History:   Diagnosis Date    Anxiety     Arthritis     Asthma     Bipolar I disorder, most recent episode (or current) depressed, unspecified 9/12/2014    Clostridium difficile infection     COPD (chronic obstructive pulmonary disease) ([address]     Depression     Disease of blood and blood forming organ     Eczema     Fracture, metacarpal     R 4th and 5th    Gastric ulcer     Gastritis 06/13/2018    GERD (gastroesophageal reflux disease)     GI bleed     H. 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[address]     Substance abuse ([address]     Suicidal ideation     Suicide attempt by hanging ([facility]     Tobacco dependence     Ulcerative colitis ([facility]     UTI (urinary tract infection)      Status EXAM:Status and Exam  Normal: No  Facial Expression: Flat  Affect: Blunt  Level of Consciousness: Alert  Mood:Normal: No  Mood: Depressed, Anxious, Labile, Irritable  Motor Activity:Normal: No  Motor Activity: Increased  Interview Behavior: Cooperative, Evasive  Preception: [place] to Person, Jenney Lamer to Time, [place] to Place, [place] to Situation  Attention:Normal: No  Attention: Distractible  Thought Processes: Circumstantial  Thought Content:Normal: No  Thought Content: Preoccupations, Poverty of Content  Hallucinations: None  Delusions: No  Memory:Normal: No  Memory: Poor Recent  Insight and Judgment: No  Insight and Judgment: Poor Insight, Poor Judgment  Present Suicidal Ideation: No  Present Homicidal Ideation: No    EDUCATION:   Learner Progress Toward Treatment Goals: reviewed group plans and strategies for care    Method:group therapy, medication compliance, individualized assessments and care planning    Outcome: needs reinforcement    PATIENT GOALS: to be discussed with patient within 72 hours    PLAN/TREATMENT RECOMMENDATIONS:     continue group therapy , medications compliance, goal setting, individualized assessments and care, continue to monitor pt on unit      SHORT-TERM GOALS:   Time frame for Short-Term Goals: 5-7 days    LONG-TERM GOALS:  Time frame for Long-Term Goals: 6 months  Members Present in Team Meeting: See [address]

## 2021-10-31 NOTE — GROUP NOTE
Group Therapy Note    Date: 10/30/2021    Group Start Time: 2030  Group End Time: 2120  Group Topic: Wrap-Up    TIESHA Patel        Group Therapy Note    Attendees: 12         Patient's Goal:  I just came in today    Notes: In middle of group pt tried to end group.    Status After Intervention:  Unchanged    Participation Level: Minimal    Participation Quality: Resistant      Speech:  loud      Thought Process/Content: Linear      Affective Functioning: Blunted      Mood: anxious      Level of consciousness:  Alert and Preoccupied      Response to Learning: Resistant      Endings: None Reported    Modes of Intervention: Problem-solving      Discipline Responsible: Behavorial Health Tech      Signature:  Renard Pepe

## 2021-10-31 NOTE — CARE COORDINATION
BHI Biopsychosocial Assessment    Current Level of Psychosocial Functioning     Independent X  Dependent    Minimal Assist     Comments:    Psychosocial High Risk Factors (check all that apply)    Unable to obtain meds X  Chronic illness/pain    Substance abuse X  Lack of Family Support   Financial stress   Isolation   Inadequate Community Resources  Suicide attempt(s)  Not taking medications X  Victim of crime   Developmental Delay  Unable to manage personal needs    Age 72 or older   Homeless X  No transportation   Readmission within 30 days  Unemployment  Traumatic Event    Comments:   Psychiatric Advanced Directives: n/a    Family to Involve in Treatment:     Sexual Orientation: n/a     Patient Strengths: Patient recently was in AoD treatment in [place], he has insurance, income and support from his family. Patient Barriers: Patient has been using cocaine, lost his medications, and is homeless. Opiate Education Provided:  Discussed with patient and provided resource material.      CMHC/mental health history: Patient has history with [facility] and most recently [address] in [address] [place] [address]   medication management, group/individual therapies, family meetings, psycho -education, treatment team meetings to assist with stabilization    Initial Discharge Plan:  Patient was provided with information to review in an effort to reconnect to AoD inpatient treatment. Clinical Summary:    Carisa Brandon is a 33 y/o male who was admitted to [address] [place] [address] for suicidal ideations with a plan. He indicated today during assessment he had also been homicidal but both issues have since gotten better. Patient would not identify who he was homicidal toward and would only indicate that it was someone who he had an issue with on the streets.   He mentioned that the treatment facility that he was most recently at in [facility] was raided by the feds so he left then went to stay with his father for a few days. On his way home he left his medications on the bus by accident and someone took them. Patient is interested in maintaining his medications again since being able to restart them in the hospital and also wants to be linked to another AoD inpatient program.  He was given a folder of resources to browse through and encouraged to make contact with any he may be interested in. Patient was calm and polite today while talking in his room and agreeable to communicating with SW when he makes contact with a treatment program so SW may follow up.

## 2021-10-31 NOTE — GROUP NOTE
Group Therapy Note    Date: 10/31/2021    Group Start Time: 1030  Group End Time: 1150  Group Topic: Cognitive Skills    [facility]    DIANE Sanz        Group Therapy Note    Attendees: 6/16         Patient's Goal:  To increase social interaction and to practice decision making, concentration, and communication skills. Notes: Pt participated fully in group task . Pt was able to practice decision making, concentration, and communication skills independently Pt is pleasant and supportive of peers. All patients were provided with a cup of candy for Halloween and encouraged to share positive holiday memories. Status After Intervention:  Improved      Participation Level: Active Listener and Interactive     Participation Quality: Attentive, Sharing,and Supportive        Speech:  Normal      Thought Process/Content: Logical answers but pt needs frequent prompts and redirection d/t verbally intrusive and impatient behavior-pt takes turns for peers who just needs a little extra time to process, and tries to influence \"\" in task to0 win round.  Pt redirects and remains in behavioral control but needs ongoing redirection        Affective Functioning: Congruent, brightens         Mood: Euthymic, pleasant in interactions but needs redirection for impatient and intrusive behaviors        Level of consciousness:  Alert, and Attentive         Response to Learning: Able to verbalize current knowledge/experience, Able to verbalize/acknowledge new learning but needs prompts to stay on task with group,  and Progressing to goal        Endings: None Reported     Modes of Intervention: Education, Support, Socialization, Exploration, Clarifying and Problem-solving        Discipline Responsible: Psychoeducational Specialist        Signature:  DIANE Armstrong

## 2021-10-31 NOTE — PLAN OF CARE
Problem: Suicide risk  Goal: Provide patient with safe environment  Description: Provide patient with safe environment  Outcome: Ongoing   Pt safe on unit  Problem: Pain:  Goal: Pain level will decrease  Description: Pain level will decrease  Outcome: Ongoing   Pt denies current pain  Problem: Anger Management/Homicidal Ideation:  Goal: Ability to verbalize frustrations and anger appropriately will improve  Description: Ability to verbalize frustrations and anger appropriately will improve  Outcome: Ongoing   Pt relates he is \"extremely agitated\". Pt able to maintain control until prn medication available  Problem: Anger Management/Homicidal Ideation:  Goal: Absence of homicidal ideation  Description: Absence of homicidal ideation  Outcome: Ongoing   Pt denies current homicidal thoughts.

## 2021-10-31 NOTE — BH NOTE
PRN Note:  Patient presented to writer with complaints of agitation requesting PRN Benadryl. Medication given at 1202 and tolerated well.

## 2021-10-31 NOTE — GROUP NOTE
Group Therapy Note    Date: 10/31/2021    Group Start Time: 1310  Group End Time: 5369  Group Topic: Psychotherapy    KRISH Morales, LSW        Group Therapy Note    Attendees: 8/16         Patient was offered group therapy today but declined to participate despite encouragement from staff. 1:1 was offered.     Signature:  KRISH Jensen, LSW

## 2021-10-31 NOTE — PLAN OF CARE
Problem: Suicide risk  Goal: Provide patient with safe environment  Description: Provide patient with safe environment  10/31/2021 1435 by Al Talavera  Outcome: Ongoing   Pt. Denies current suicidal thoughts. Pt. Remains safe on the unit. Q 15 minute checks for safety maintained. Problem: Pain:  Goal: Pain level will decrease  Description: Pain level will decrease  10/31/2021 1435 by Al Talavera  Outcome: Ongoing   Pt relates to headache this morning. Denies this afternoon. Will continue to monitor for pain. Problem: Anger Management/Homicidal Ideation:  Goal: Ability to verbalize frustrations and anger appropriately will improve  Description: Ability to verbalize frustrations and anger appropriately will improve  10/31/2021 1435 by Al Talavera  Outcome: Ongoing     Pt. Denies hallucinations. Out in milieu with peers. Plays cards and watches tv at intervals. Pt. Is focused on medications. Pt has been controlled and cooperative. Some periods of brief irritability. Problem: Anger Management/Homicidal Ideation:  Goal: Absence of homicidal ideation  Description: Absence of homicidal ideation  10/31/2021 1435 by Al Talavera  Outcome: Ongoing   Denies current homicidal thoughts. Pt. Remains safe on the unit. Q 15 minute checks for safety maintained.

## 2021-10-31 NOTE — BH NOTE
PRN note  Pt agitated as evidenced by pacing halls, clenching fists, and verbalizing agitation to nurse. Pt relates \"Benadryl shot is the only thing that helps me. \" when offered PRN olanzapine pt states \"my throat swells shut when I take that. \" pt refuses to explore options to alleviate agitation such as talking with staff, shower time, and snacks. Pt tolerates PRN benadryl without incident.

## 2021-10-31 NOTE — BH NOTE
Pt. Did not attend morning goal group/community meeting. Pt. Offered 1:1 talk time as an alternative to group, pt declined.

## 2021-10-31 NOTE — PROGRESS NOTES
Behavioral Services  Medicare Certification Upon Admission    I certify that this patient's inpatient psychiatric hospital admission is medically necessary for:    [x] (1) Treatment which could reasonably be expected to improve this patient's condition,       [x] (2) Or for diagnostic study;     AND     [x](2) The inpatient psychiatric services are provided while the individual is under the care of a physician and are included in the individualized plan of care.     Estimated length of stay/service 3-9 days    Plan for post-hospital care -outpatient care    Electronically signed by Van Lilly MD on 10/31/2021 at 12:00 PM

## 2021-11-01 PROCEDURE — 6360000002 HC RX W HCPCS: Performed by: PSYCHIATRY & NEUROLOGY

## 2021-11-01 PROCEDURE — 1240000000 HC EMOTIONAL WELLNESS R&B

## 2021-11-01 PROCEDURE — 99232 SBSQ HOSP IP/OBS MODERATE 35: CPT | Performed by: PSYCHIATRY & NEUROLOGY

## 2021-11-01 PROCEDURE — 6370000000 HC RX 637 (ALT 250 FOR IP): Performed by: PSYCHIATRY & NEUROLOGY

## 2021-11-01 PROCEDURE — APPSS30 APP SPLIT SHARED TIME 16-30 MINUTES: Performed by: PSYCHIATRY & NEUROLOGY

## 2021-11-01 RX ORDER — MECLIZINE HCL 12.5 MG/1
12.5 TABLET ORAL 3 TIMES DAILY PRN
Qty: 30 TABLET | Refills: 0 | Status: SHIPPED | OUTPATIENT
Start: 2021-11-01 | End: 2021-11-11

## 2021-11-01 RX ORDER — BUPRENORPHINE AND NALOXONE 8; 2 MG/1; MG/1
2 FILM, SOLUBLE BUCCAL; SUBLINGUAL DAILY
Qty: 6 FILM | Refills: 0 | Status: SHIPPED | OUTPATIENT
Start: 2021-11-02 | End: 2021-11-04 | Stop reason: SDUPTHER

## 2021-11-01 RX ORDER — TRAZODONE HYDROCHLORIDE 50 MG/1
50 TABLET ORAL NIGHTLY PRN
Qty: 30 TABLET | Refills: 0 | Status: SHIPPED | OUTPATIENT
Start: 2021-11-01 | End: 2021-12-27 | Stop reason: SDUPTHER

## 2021-11-01 RX ORDER — QUETIAPINE FUMARATE 100 MG/1
100 TABLET, FILM COATED ORAL NIGHTLY
Qty: 30 TABLET | Refills: 0 | Status: ON HOLD | OUTPATIENT
Start: 2021-11-01 | End: 2021-11-22 | Stop reason: SDUPTHER

## 2021-11-01 RX ADMIN — DIPHENHYDRAMINE HYDROCHLORIDE 50 MG: 50 INJECTION INTRAMUSCULAR; INTRAVENOUS at 07:06

## 2021-11-01 RX ADMIN — CLONIDINE HYDROCHLORIDE 0.1 MG: 0.1 TABLET ORAL at 14:09

## 2021-11-01 RX ADMIN — HYDROXYZINE HYDROCHLORIDE 50 MG: 50 TABLET, FILM COATED ORAL at 21:28

## 2021-11-01 RX ADMIN — DIPHENHYDRAMINE HYDROCHLORIDE 50 MG: 50 INJECTION INTRAMUSCULAR; INTRAVENOUS at 00:33

## 2021-11-01 RX ADMIN — TRAZODONE HYDROCHLORIDE 50 MG: 50 TABLET ORAL at 21:28

## 2021-11-01 RX ADMIN — SUCRALFATE 1 G: 1 TABLET ORAL at 07:38

## 2021-11-01 RX ADMIN — HYDROXYZINE HYDROCHLORIDE 50 MG: 50 TABLET, FILM COATED ORAL at 17:25

## 2021-11-01 RX ADMIN — MULTIPLE VITAMINS W/ MINERALS TAB 1 TABLET: TAB at 07:38

## 2021-11-01 RX ADMIN — METFORMIN HYDROCHLORIDE 500 MG: 500 TABLET ORAL at 07:39

## 2021-11-01 RX ADMIN — CLONIDINE HYDROCHLORIDE 0.1 MG: 0.1 TABLET ORAL at 21:28

## 2021-11-01 RX ADMIN — NICOTINE POLACRILEX 2 MG: 2 LOZENGE ORAL at 19:24

## 2021-11-01 RX ADMIN — NICOTINE POLACRILEX 2 MG: 2 LOZENGE ORAL at 21:16

## 2021-11-01 RX ADMIN — TIZANIDINE 4 MG: 4 TABLET ORAL at 07:50

## 2021-11-01 RX ADMIN — NICOTINE POLACRILEX 2 MG: 2 LOZENGE ORAL at 09:54

## 2021-11-01 RX ADMIN — DIPHENHYDRAMINE HYDROCHLORIDE 50 MG: 50 INJECTION INTRAMUSCULAR; INTRAVENOUS at 12:07

## 2021-11-01 RX ADMIN — BUPRENORPHINE AND NALOXONE 2 FILM: 8; 2 FILM BUCCAL; SUBLINGUAL at 07:38

## 2021-11-01 RX ADMIN — PANTOPRAZOLE SODIUM 40 MG: 40 TABLET, DELAYED RELEASE ORAL at 06:55

## 2021-11-01 RX ADMIN — NICOTINE POLACRILEX 2 MG: 2 LOZENGE ORAL at 17:41

## 2021-11-01 RX ADMIN — HYDROXYZINE HYDROCHLORIDE 50 MG: 50 TABLET, FILM COATED ORAL at 09:59

## 2021-11-01 RX ADMIN — NICOTINE POLACRILEX 2 MG: 2 LOZENGE ORAL at 14:09

## 2021-11-01 RX ADMIN — NICOTINE POLACRILEX 2 MG: 2 LOZENGE ORAL at 12:15

## 2021-11-01 RX ADMIN — GABAPENTIN 600 MG: 600 TABLET, FILM COATED ORAL at 21:28

## 2021-11-01 RX ADMIN — CLONIDINE HYDROCHLORIDE 0.1 MG: 0.1 TABLET ORAL at 07:39

## 2021-11-01 RX ADMIN — NICOTINE POLACRILEX 2 MG: 2 LOZENGE ORAL at 15:47

## 2021-11-01 RX ADMIN — GABAPENTIN 600 MG: 600 TABLET, FILM COATED ORAL at 07:39

## 2021-11-01 RX ADMIN — SUCRALFATE 1 G: 1 TABLET ORAL at 21:28

## 2021-11-01 RX ADMIN — GABAPENTIN 600 MG: 600 TABLET, FILM COATED ORAL at 14:09

## 2021-11-01 RX ADMIN — QUETIAPINE FUMARATE 100 MG: 100 TABLET ORAL at 21:28

## 2021-11-01 RX ADMIN — NICOTINE POLACRILEX 2 MG: 2 LOZENGE ORAL at 06:55

## 2021-11-01 NOTE — BH NOTE
PT states he feels he is going to go off states he is agitated. PT refusing to try anything PO states nothing else works , that it causes his stomach to hurt, it causes headaches, he is allergic. PT only willing to take IM benadryl given will continue to monitor.

## 2021-11-01 NOTE — GROUP NOTE
Group Therapy Note    Date: 11/1/2021    Group Start Time: 1430  Group End Time: 3239  Group Topic: Music Therapy    [facility] CHRIS Heck        Group Therapy Note    Pt did not attend music therapy group d/t resting in room despite staff invitation to attend. 1:1 talk time offered as alternative to group session, pt declined.

## 2021-11-01 NOTE — GROUP NOTE
Group Therapy Note    Date: 11/1/2021    Group Start Time: 1005  Group End Time: 1050  Group Topic: Psychotherapy    TIESHA CAMPOS    KRISH Ryan LSW        Group Therapy Note    Attendees: 10/17         Patient's Goal:  Increase interpersonal relationship skills    Notes:  Patient was an active participant in group discussion    Status After Intervention:  Unchanged    Participation Level:  Active Listener and Interactive    Participation Quality: Appropriate, Attentive and Sharing      Speech:  normal      Thought Process/Content: Logical      Affective Functioning: Flat      Mood: dysphoric      Level of consciousness:  Alert, Oriented x4 and Attentive      Response to Learning: Able to verbalize current knowledge/experience, Able to verbalize/acknowledge new learning and Able to retain information      Endings: None Reported    Modes of Intervention: Support, Socialization and Exploration      Discipline Responsible: /Counselor      Signature:  KRISH Ryan LSW

## 2021-11-01 NOTE — SUICIDE SAFETY PLAN
SAFETY PLAN    A suicide Safety Plan is a document that supports someone when they are having thoughts of suicide. Warning Signs that indicate a suicidal crisis may be developing: What (situations, thoughts, feelings, body sensations, behaviors, etc.) do you experience that lets you know you are beginning to think about suicide? 1. depresses  2. High anxiety  3. In pain    Internal Coping Strategies:  What things can I do (relaxation techniques, hobbies, physical activities, etc.) to take my mind off my problems without contacting another person? 1. swimming  2. Listening to music  3. Reading books    People and social settings that provide distraction: Who can I call or where can I go to distract me? 1. Name: friends  Phone:   2. Name: family  Phone:    3. Place: [employer]            4. Place: Psychiatrists    People whom I can ask for help: Who can I call when I need help - for example, friends, family, clergy, someone else? 1. Name: Friends                Phone:   2. Name: Family  Phone:   3. Name: Clergy  Phone:     Professionals or [facility] agencies I can contact during a crisis: Who can I call for help - for example, my doctor, my psychiatrist, my psychologist, a mental health provider, a suicide hotline? 1. Clinician Name: Psychiatrist   Phone:       Clinician Pager or Emergency Contact #: [phone]    8. Clinician Name: Daly Banerjee   Phone: [phone]      Clinician Pager or Emergency Contact #: [phone]    1. Suicide Prevention Lifeline: 1-225-612-TALK (1359)    4. [address] [place] [address] Emergency Services -  for example, [facility], [facility] suicide hotline, [facility] Hotline: 78069488870      Emergency Services Address: [facility]      Emergency Services Phone: [phone]    Making the environment safe: How can I make my environment (house/apartment/living space) safer? For example, can I remove guns, medications, and other items? 1. No guns  2.  No knives

## 2021-11-01 NOTE — PROGRESS NOTES
CLINICAL PHARMACY NOTE: MEDS TO BEDS    Total # of Prescriptions Filled: 4   The following medications were delivered to the patient:  · Buprenorphine HCL-Naloxone 8/2 Film  · Trazodone HCL 50mg  · Meclizine HCL 12.5mg  · Quetiapine Fumarate 100mg    Additional Documentation:  Delivered Medication to Torie Lassiter

## 2021-11-01 NOTE — PLAN OF CARE
[facility]  Day 3 Interdisciplinary Treatment Plan NOTE    Review Date & Time: 11/1/2021   1628    Admission Type:   Admission Type: Voluntary    Reason for admission:  Reason for Admission: having thoughts of suicide  Estimated Length of Stay: 5-7 days  Estimated Discharge Date Update: to be determined by physician    PATIENT STRENGTHS:  Patient Strengths Strengths: No significant Physical Illness, Positive Support  Patient Strengths and Limitations:Limitations: Multiple barriers to leisure interests, Inappropriate/potentially harmful leisure interests, Difficult relationships / poor social skills, Difficulty problem solving/relies on others to help solve problems, Perceives need for assistance with self-care  Addictive Behavior:Addictive Behavior  In the past 3 months, have you felt or has someone told you that you have a problem with:  : None  Do you have a history of Chemical Use?: No  Do you have a history of Alcohol Use?: No  Do you have a history of Street Drug Abuse?: Yes  Histroy of Prescripton Drug Abuse?: No  Medical Problems:  Past Medical History:   Diagnosis Date    Anxiety     Arthritis     Asthma     Bipolar I disorder, most recent episode (or current) depressed, unspecified 9/12/2014    Clostridium difficile infection     COPD (chronic obstructive pulmonary disease) ([address]     Depression     Disease of blood and blood forming organ     Eczema     Fracture, metacarpal     R 4th and 5th    Gastric ulcer     Gastritis 06/13/2018    GERD (gastroesophageal reflux disease)     GI bleed     H. 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[address]     Substance abuse ([address]     Suicidal ideation     Suicide attempt by hanging ([address] Tobacco dependence     Ulcerative colitis ([facility]     UTI (urinary tract infection)        Risk:  Fall RiskTotal: 53  Ross Scale Ross Scale Score: 22  BVC    Change in scores no Changes to plan of Care no    Status EXAM:   Status and Exam  Normal: No  Facial Expression: Exaggerated  Affect: Blunt  Level of Consciousness: Alert  Mood:Normal: No  Mood: Anxious, Suspicious, Labile  Motor Activity:Normal: No  Motor Activity: Repetitive Acts, Increased  Interview Behavior: Evasive  Preception: [place] to Person, [place] to Time, [place] to Place, [place] to Situation  Attention:Normal: No  Attention: Distractible, Unable to Concentrate  Thought Processes: Circumstantial  Thought Content:Normal: No  Thought Content: Preoccupations  Hallucinations: None  Delusions: No  Memory:Normal: Yes  Memory: Poor Recent  Insight and Judgment: No  Insight and Judgment: Poor Judgment, Poor Insight, Unmotivated, Unrealistic  Present Suicidal Ideation: No  Present Homicidal Ideation: No    Daily Assessment Last Entry:   Daily Sleep (WDL): Within Defined Limits         Patient Currently in Pain: Denies  Daily Nutrition (WDL): Within Defined Limits    Patient Monitoring:  Frequency of Checks: 4 times per hour, close    Psychiatric Symptoms:   Depression Symptoms  Depression Symptoms: Impaired concentration  Anxiety Symptoms  Anxiety Symptoms: Generalized  Elizabeth Symptoms  Elizabeth Symptoms: No problems reported or observed. Psychosis Symptoms  Delusion Type: No problems reported or observed. Suicide Risk CSSR-S:  1) Within the past month, have you wished you were dead or wished you could go to sleep and not wake up? : Yes  2) Have you actually had any thoughts of killing yourself? : No  3) Have you been thinking about how you might kill yourself? : No  5) Have you started to work out or worked out the details of how to kill yourself?  Do you intend to carry out this plan? : No  6) Have you ever done anything, started to do anything, or prepared to do anything to end your life?: No  Change in Result NO Change in Plan of care NO      EDUCATION:   EDUCATION:   Learner Progress Toward Treatment Goals: Reviewed results and recommendations of this team, Reviewed group plan and strategies, Reviewed signs, symptoms and risk of self harm and violent behavior, Reviewed goals and plan of care    Method:small group, individual verbal education    Outcome:verbalized by patient, but needs reinforcement to obtain goals    PATIENT GOALS:  Short term:  Follow-up with [facility] and attend AoD treatment in [facility] term: Patient wants to follow-up on a specific AoD treatment facility before returning home; Maintain sorbiety    PLAN/TREATMENT RECOMMENDATIONS UPDATE: continue with group therapies, increased socialization, continue planning for after discharge goals, continue with medication compliance    SHORT-TERM GOALS UPDATE:   Time frame for Short-Term Goals: 5-7 days    LONG-TERM GOALS UPDATE:   Time frame for Long-Term Goals: 6 months  Members Present in Team Meeting: See Signature Sheet    Titus Contreras

## 2021-11-01 NOTE — PLAN OF CARE
Problem: Anger Management/Homicidal Ideation:  Goal: Ability to verbalize frustrations and anger appropriately will improve  Description: Ability to verbalize frustrations and anger appropriately will improve  Outcome: Ongoing  Patient is alert, observed in day area. Patient is suspicious and evasive on approach. Patient is currently denying thoughts of wanting to harm self or others. Patient reports not having any tactile, gustatory, auditory, olfactory, or visual hallucinations. Patient denies having any depression. Patient is visible on unit, social with select peers, paces halls. Patient is somatic and medication focused, states that he is \"agitated,\" focused on Benadryl IM. Patient reports atarax \"does not help with my anxiety. \" Patient reports adequate sleep and appetite. Patient encouraged to socialize with peers, attend unit programming, and attend to proper hygiene care.

## 2021-11-01 NOTE — PROGRESS NOTES
Daily Progress Note  11/1/2021    Patient Name: Arian Garcias    CHIEF COMPLAINT: Suicidal ideation with plan to shoot self regarding chronic pain         SUBJECTIVE:    Nursing staff report the patient has maintained medication adherence. He has utilized as needed medications including Zanaflex, trazodone, Benadryl IM, and hydroxyzine. Patient remains very discharge focus currently as his previous AOD facility was closed and currently he wishes to go to his sisters. He has limited insight into the possibility of utilizing drugs if he has a between treatment. Currently he is endorsing improved mood. He reports improved suicidal ideation and contracts for safety on unit. He is grateful to the safety of the milieu offering that his sleep pattern and appetite are adequate. Writer encouraged patient to attend groups on the unit. At this time, the patient is not appropriate for a lower level of care. There is risk of decompensation and patient warrants further hospitalization for safety and stabilization. Appetite:  [x] Normal/Adequate/Unchanged  [] Increased  [] Decreased      Sleep:       [x] Normal/Adequate/Unchanged  [] Fair  [] Poor      Group Attendance on Unit:   [] Yes  [x] Selectively    [] No    Medication Side Effects:  Patient denies any medication side effects at the time of assessment. Mental Status Exam  Level of consciousness: Awake, appears somnolent  Appearance: Appropriate attire for setting, seated in chair and walking milia, with fair  grooming and hygiene. Behavior/Motor: Approachable, no psychomotor abnormalities. Attitude toward examiner: Cooperative, attentive, good eye contact. Speech: Normal rate, normal volume, normal tone. Mood:  Patient reports \"better\". Affect: Blunted  Thought processes: Linear and goal directed. Thought content: Denies homicidal ideation.   Suicidal Ideation: Reports improvement in suicidal ideations, without current plan or intent, contracts for safety on the unit. Delusions: No evidence of delusions. Denies paranoia. Perceptual Disturbance: Patient does not appear to be responding to internal stimuli. Denies auditory hallucinations. Denies visual hallucinations. Cognition: Oriented to self, location, time, and situation. Memory: Intact. Insight & Judgement: Poor. Data   height is 5' 9\" (1.753 m) and weight is 250 lb (113.4 kg). His oral temperature is 97.4 °F (36.3 °C). His blood pressure is 125/75 and his pulse is 74. His respiration is 14 and oxygen saturation is 99%.    Labs:   Admission on 10/30/2021   Component Date Value Ref Range Status    WBC 10/30/2021 6.7  3.5 - 11.0 k/uL Final    RBC 10/30/2021 4.58  4.5 - 5.9 m/uL Final    Hemoglobin 10/30/2021 13.5  13.5 - 17.5 g/dL Final    Hematocrit 10/30/2021 40.7* 41 - 53 % Final    MCV 10/30/2021 88.8  80 - 100 fL Final    MCH 10/30/2021 29.5  26 - 34 pg Final    MCHC 10/30/2021 33.2  31 - 37 g/dL Final    RDW 10/30/2021 13.3  11.5 - 14.9 % Final    Platelets 46/30/5418 275  150 - 450 k/uL Final    MPV 10/30/2021 7.6  6.0 - 12.0 fL Final    NRBC Automated 10/30/2021 NOT REPORTED  per 100 WBC Final    Differential Type 10/30/2021 NOT REPORTED   Final    Seg Neutrophils 10/30/2021 56  36 - 66 % Final    Lymphocytes 10/30/2021 34  24 - 44 % Final    Monocytes 10/30/2021 7  1 - 7 % Final    Eosinophils % 10/30/2021 2  0 - 4 % Final    Basophils 10/30/2021 1  0 - 2 % Final    Immature Granulocytes 10/30/2021 NOT REPORTED  0 % Final    Segs Absolute 10/30/2021 3.80  1.3 - 9.1 k/uL Final    Absolute Lymph # 10/30/2021 2.30  1.0 - 4.8 k/uL Final    Absolute Mono # 10/30/2021 0.50  0.1 - 1.3 k/uL Final    Absolute Eos # 10/30/2021 0.10  0.0 - 0.4 k/uL Final    Basophils Absolute 10/30/2021 0.10  0.0 - 0.2 k/uL Final    Absolute Immature Granulocyte 10/30/2021 NOT REPORTED  0.00 - 0.30 k/uL Final    WBC Morphology 10/30/2021 NOT REPORTED   Final    RBC Morphology 10/30/2021 NOT REPORTED   Final    Platelet Estimate 35/02/2235 NOT REPORTED   Final    Glucose 10/30/2021 156* 70 - 99 mg/dL Final    BUN 10/30/2021 12  6 - 20 mg/dL Final    CREATININE 10/30/2021 0.81  0.70 - 1.20 mg/dL Final    Bun/Cre Ratio 10/30/2021 NOT REPORTED  9 - 20 Final    Calcium 10/30/2021 8.8  8.6 - 10.4 mg/dL Final    Sodium 10/30/2021 138  135 - 144 mmol/L Final    Potassium 10/30/2021 4.2  3.7 - 5.3 mmol/L Final    Chloride 10/30/2021 104  98 - 107 mmol/L Final    CO2 10/30/2021 24  20 - 31 mmol/L Final    Anion Gap 10/30/2021 10  9 - 17 mmol/L Final    GFR Non- 10/30/2021 >60  >60 mL/min Final    GFR  10/30/2021 >60  >60 mL/min Final    GFR Comment 10/30/2021        Final    Comment: Average GFR for 30-36 years old:   80 mL/min/1.73sq m  Chronic Kidney Disease:   <60 mL/min/1.73sq m  Kidney failure:   <15 mL/min/1.73sq m              eGFR calculated using average adult body mass.  Additional eGFR calculator available at:        [email or web address]            GFR Staging 10/30/2021 NOT REPORTED   Final    Acetaminophen Level 10/30/2021 <5* 10 - 30 ug/mL Final    Ethanol 10/30/2021 <10  <10 mg/dL Final    Ethanol percent 10/30/2021 <7.500  % Final    Salicylate Lvl 24/43/4866 <1* 3 - 10 mg/dL Final    Toxic Tricyclic Sc,Blood 95/48/5237 WRONG TEST ORDERED  NEGATIVE Final    Amphetamine Screen, Ur 10/30/2021 NEGATIVE  NEGATIVE Final    Comment:       (Positive cutoff 1000 ng/mL)                  Barbiturate Screen, Ur 10/30/2021 NEGATIVE  NEGATIVE Final    Comment:       (Positive cutoff 200 ng/mL)                  Benzodiazepine Screen, Urine 10/30/2021 NEGATIVE  NEGATIVE Final    Comment:       (Positive cutoff 200 ng/mL)                  Cocaine Metabolite, Urine 10/30/2021 POSITIVE* NEGATIVE Final    Comment:       (Positive cutoff 300 ng/mL)                  Methadone Screen, Urine 10/30/2021 NEGATIVE  NEGATIVE Final    Comment:       (Positive cutoff 300 ng/mL)                  Opiates, Urine 10/30/2021 NEGATIVE  NEGATIVE Final    Comment:       (Positive cutoff 300 ng/mL)                  Phencyclidine, Urine 10/30/2021 NEGATIVE  NEGATIVE Final    Comment:       (Positive cutoff 25 ng/mL)                  Propoxyphene, Urine 10/30/2021 NOT REPORTED  NEGATIVE Final    Cannabinoid Scrn, Ur 10/30/2021 NEGATIVE  NEGATIVE Final    Comment:       (Positive cutoff 50 ng/mL)                  Oxycodone Screen, Ur 10/30/2021 NEGATIVE  NEGATIVE Final    Comment:       (Positive cutoff 100 ng/mL)                  Methamphetamine, Urine 10/30/2021 NOT REPORTED  NEGATIVE Final    Tricyclic Antidepressants, Urine 10/30/2021 NOT REPORTED  NEGATIVE Final    MDMA, Urine 10/30/2021 NOT REPORTED  NEGATIVE Final    Buprenorphine Urine 10/30/2021 NOT REPORTED  NEGATIVE Final    Test Information 10/30/2021 Assay provides medical screening only. The absence of expected drug(s) and/or metabolite(s) may indicate diluted or adulterated urine, limitations of testing or timing of collection. Final    Comment: Testing for legal purposes should be confirmed by another method. To request confirmation   of test result, please call the lab within 7 days of sample submission.       Color, UA 10/30/2021 Dark Yellow* Yellow Final    Turbidity UA 10/30/2021 Clear  Clear Final    Glucose, Ur 10/30/2021 NEGATIVE  NEGATIVE Final    Bilirubin Urine 10/30/2021 NEGATIVE  Verified by ictotest.* NEGATIVE Corrected    CORRECTED ON 10/30 AT 1253: PREVIOUSLY REPORTED AS SMALL    Ketones, Urine 10/30/2021 TRACE* NEGATIVE Final    Specific Gravity, UA 10/30/2021 1.036* 1.000 - 1.030 Final    Urine Hgb 10/30/2021 NEGATIVE  NEGATIVE Final    pH, UA 10/30/2021 5.5  5.0 - 8.0 Final    Protein, UA 10/30/2021 NEGATIVE  NEGATIVE Final    Urobilinogen, Urine 10/30/2021 Normal  Normal Final    Nitrite, Urine 10/30/2021 NEGATIVE  NEGATIVE Final    Leukocyte Esterase, Urine 10/30/2021 NEGATIVE  NEGATIVE Final    Urinalysis Comments 10/30/2021 NOT REPORTED   Final    Specimen Description 10/30/2021 . NASOPHARYNGEAL SWAB   Final    SARS-CoV-2, Rapid 10/30/2021 Not Detected  Not Detected Final    Comment:       Rapid NAAT:  The specimen is NEGATIVE for SARS-CoV-2, the novel coronavirus associated with   COVID-19. The ID NOW COVID-19 assay is designed to detect the virus that causes COVID-19 in patients   with signs and symptoms of infection who are suspected of COVID-19. An individual without symptoms of COVID-19 and who is not shedding SARS-CoV-2 virus would   expect to have a negative (not detected) result in this assay. Negative results should be treated as presumptive and, if inconsistent with clinical signs   and symptoms or necessary for patient management,  should be tested with an alternative molecular assay. Negative results do not preclude   SARS-CoV-2 infection and   should not be used as the sole basis for patient management decisions. Fact sheet for Healthcare Providers: Darian  Fact sheet for Patients: Darian          Methodology: Isothermal Nucleic Acid Amplification      Tricyclic Antidep,Urine 26/82/3707 NEGATIVE  NEGATIVE Final    Comment:       (Positive cutoff 1000 ng/mL)  Assay provides rapid clinical screening only. Presumptive positive results for legal   purposes should be confirmed by another method. To request confirmation, please call the   lab within 7 days of sample submission.       - 10/30/2021        Final    WBC, UA 10/30/2021 2 TO 5  /HPF Final    RBC, UA 10/30/2021 0 TO 2  /HPF Final    Casts UA 10/30/2021 NOT REPORTED  /LPF Final    Crystals, UA 10/30/2021 NOT REPORTED  None /HPF Final    Epithelial Cells UA 10/30/2021 2 TO 5  /HPF Final    Renal Epithelial, UA 10/30/2021 NOT REPORTED  0 /HPF Final    Bacteria, UA 10/30/2021 FEW* None Final    Mucus, UA 10/30/2021 2+* None Final    Trichomonas, UA 10/30/2021 NOT REPORTED  None Final    Amorphous, UA 10/30/2021 NOT REPORTED  None Final    Other Observations UA 10/30/2021 NOT REPORTED  NOT REQ. Final    Yeast, UA 10/30/2021 NOT REPORTED  None Final         Reviewed patient's current plan of care and vital signs with nursing staff. Labs reviewed: [x] Yes  Last EKG in EMR reviewed: [x] Yes  QTc: 397    Medications  Current Facility-Administered Medications: buprenorphine-naloxone (SUBOXONE) 8-2 MG SL film 2 Film, 2 Film, SubLINGual, Daily  nicotine polacrilex (COMMIT) lozenge 2 mg, 2 mg, Oral, Q2H PRN  meclizine (ANTIVERT) tablet 12.5 mg, 12.5 mg, Oral, TID PRN  ondansetron (ZOFRAN) tablet 4 mg, 4 mg, Oral, Q8H PRN  acetaminophen (TYLENOL) tablet 1,000 mg, 1,000 mg, Oral, Once  ondansetron (ZOFRAN-ODT) disintegrating tablet 4 mg, 4 mg, Oral, Once  acetaminophen (TYLENOL) tablet 650 mg, 650 mg, Oral, Q4H PRN  aluminum & magnesium hydroxide-simethicone (MAALOX) 200-200-20 MG/5ML suspension 30 mL, 30 mL, Oral, Q6H PRN  hydrOXYzine (ATARAX) tablet 50 mg, 50 mg, Oral, TID PRN  polyethylene glycol (GLYCOLAX) packet 17 g, 17 g, Oral, Daily PRN  traZODone (DESYREL) tablet 50 mg, 50 mg, Oral, Nightly PRN  cloNIDine (CATAPRES) tablet 0.1 mg, 0.1 mg, Oral, TID  metFORMIN (GLUCOPHAGE) tablet 500 mg, 500 mg, Oral, BID WC  therapeutic multivitamin-minerals 1 tablet, 1 tablet, Oral, Daily  pantoprazole (PROTONIX) tablet 40 mg, 40 mg, Oral, QAM AC  QUEtiapine (SEROQUEL) tablet 100 mg, 100 mg, Oral, Nightly  sucralfate (CARAFATE) tablet 1 g, 1 g, Oral, 4x Daily  gabapentin (NEURONTIN) tablet 600 mg, 600 mg, Oral, TID  diphenhydrAMINE (BENADRYL) injection 50 mg, 50 mg, IntraMUSCular, Q4H PRN    ASSESSMENT  Depression with suicidal ideation         PLAN  Patient symptoms are: Modestly Improving. Continue current medication regimen. Monitor need and frequency of PRN medications.   Encourage participation in groups and milieu. Attempt to develop insight. Psycho-education conducted. Supportive Therapy conducted. Probable discharge per attending physician and currently undetermined. Follow-up daily while inpatient. Patient continues to be monitored in the inpatient psychiatric facility at [facility] for safety and stabilization. Patient continues to need, on a daily basis, active treatment furnished directly by or requiring the supervision of inpatient psychiatric personnel. Electronically signed by MASSIEL Pittman CNP on 11/1/2021 at 2:27 PM    **This report has been created using voice recognition software. It may contain minor errors which are inherent in voice recognition technology. **                                         Psychiatry Attending Attestation     I independently saw and evaluated the patient. I reviewed the Advance Practice Provider's documentation above. Any additional comments or changes to the Advance Practice Provider's documentation are stated below otherwise agree with assessment. Notes that his suicidal thoughts are improving. Appears very restless and anxious here on the unit. Reports that he is feeling hopeful about his recovery. Denies any side effect from the medication. Patient is very focused on the controlled substances. Reviewed [place]AR[place] extensively. Concern for manipulative behaviors to get opioids and other controlled substances. We will cut down on the Zanaflex and Fioricet. I discussed his case at length with his outpatient provider Julius Wiley who was willing to follow-up with him this Thursday at 11 AM.  Patient to his outpatient provider and to the patient that I will be able to give Suboxone until he will be able to follow-up with her next Thursday. Plan to discharge tomorrow if he continues to improve.     Electronically signed by Christine Navarro MD on 11/1/21 at 3:04 PM EDT

## 2021-11-02 VITALS
DIASTOLIC BLOOD PRESSURE: 68 MMHG | SYSTOLIC BLOOD PRESSURE: 109 MMHG | HEIGHT: 69 IN | TEMPERATURE: 97.8 F | OXYGEN SATURATION: 99 % | RESPIRATION RATE: 14 BRPM | HEART RATE: 94 BPM | WEIGHT: 250 LBS | BODY MASS INDEX: 37.03 KG/M2

## 2021-11-02 PROCEDURE — 6370000000 HC RX 637 (ALT 250 FOR IP): Performed by: PSYCHIATRY & NEUROLOGY

## 2021-11-02 PROCEDURE — 99239 HOSP IP/OBS DSCHRG MGMT >30: CPT | Performed by: PSYCHIATRY & NEUROLOGY

## 2021-11-02 RX ADMIN — GABAPENTIN 600 MG: 600 TABLET, FILM COATED ORAL at 08:20

## 2021-11-02 RX ADMIN — NICOTINE POLACRILEX 2 MG: 2 LOZENGE ORAL at 06:00

## 2021-11-02 RX ADMIN — PANTOPRAZOLE SODIUM 40 MG: 40 TABLET, DELAYED RELEASE ORAL at 05:59

## 2021-11-02 RX ADMIN — BUPRENORPHINE AND NALOXONE 2 FILM: 8; 2 FILM BUCCAL; SUBLINGUAL at 08:20
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
## 2021-11-02 NOTE — GROUP NOTE
Group Therapy Note    Date: 11/2/2021    Group Start Time: 0900  Group End Time: 0930  Group Topic: Group Documentation    TIESHA CAMPOS    Adilia Polo        Group Therapy Note    Attendees: 6/16         Patient's Goal:  D/C today - pay phone bill, make some calls  Notes:  Goal setting/community meeting    Status After Intervention:  Improved    Participation Level:  Active Listener and Interactive    Participation Quality: Appropriate, Attentive, Sharing and Supportive      Speech:  normal      Thought Process/Content: Logical      Affective Functioning: Congruent      Mood: anxious      Level of consciousness:  Alert, Oriented x4 and Attentive      Response to Learning: Able to verbalize current knowledge/experience, Able to verbalize/acknowledge new learning, Able to retain information and Capable of insight      Endings: None Reported    Modes of Intervention: Education, Support, Socialization, Exploration and Problem-solving      Discipline Responsible: [address] [facility]      Signature:  Adilia Polo

## 2021-11-02 NOTE — PLAN OF CARE
Problem: Anger Management/Homicidal Ideation:  Goal: Ability to verbalize frustrations and anger appropriately will improve  Description: Ability to verbalize frustrations and anger appropriately will improve  Note: PT requires some redirection but is accepting. PT states he is ready for discharged and is preoccupied about what time he can leave in the am. PT is aloof to self on unit. Pt has poor sleep waking up multiple times through out the night. Pt is taking ordered medications and attends unit programming. PT maintained on 15 min safety checks and rounds at irregular intervals. Problem: Anger Management/Homicidal Ideation:  Goal: Absence of homicidal ideation  Description: Absence of homicidal ideation  Note: Pt denies any homicidal thoughts remains in behavioral control on unit.

## 2021-11-02 NOTE — GROUP NOTE
HS Goal Group     Date: November 1, 2021   Group Start Time: 2000   Group End Time: 2040   [facility]    Attendees: 9/15     Group Topic: HS Goal Group   Notes: Patient participated in HS goal group. Status After Intervention: Improved     Participation Level:  Active Listener and Interactive     Participation Quality: Appropriate, attentive and supportive     Speech: Normal     Thought Process/Content: Logical and linear     Affective Functioning: Congruent     Mood: WDL     Level of consciousness: Oriented x4     Response to Learning: Progressing to goal; able to verbalize current knowledge/experience, acknowledge new learning, retain information and change behavior Endings: None reported     Modes of Intervention: Education and exploration     Discipline Responsible: Behavioral Health Tech   Signature: GABRIELLE Pereyra

## 2021-11-02 NOTE — BH NOTE
[facility]  Discharge Note    Pt discharged with followings belongings:   Dentures: None  Vision - Corrective Lenses: None  Hearing Aid: None  Jewelry: Other (Comment) (silver/toned chain)  Body Piercings Removed: N/A  Clothing: Socks, Pants, Shirt, Jacket / coat, Footwear  Other Valuables: Wallet, Other (Comment), Cell phone   Valuables sent home with patient or returned to patient. Patient education on aftercare instructions: Yes  Information faxed to [facility] Ekaterina Schreiber by RN  at 10:06 AM .Patient verbalize understanding of AVS:  Yes.     Status EXAM upon discharge:  Status and Exam  Normal: No  Facial Expression: Flat, Exaggerated  Affect: Blunt  Level of Consciousness: Alert  Mood:Normal: No  Mood: Anxious, Labile  Motor Activity:Normal: No  Motor Activity: Increased, Repetitive Acts  Interview Behavior: Evasive  Preception: [place] to Person, [place] to Time, [place] to Place, [place] to Situation  Attention:Normal: No  Attention: Distractible  Thought Processes: Circumstantial  Thought Content:Normal: No  Thought Content: Preoccupations  Hallucinations: None  Delusions: No  Memory:Normal: No  Memory: Confabulation  Insight and Judgment: No  Insight and Judgment: Poor Judgment, Poor Insight, Unrealistic, Unmotivated  Present Suicidal Ideation: No  Present Homicidal Ideation: No      Metabolic Screening:    Lab Results   Component Value Date    LABA1C 5.7 10/23/2014       Lab Results   Component Value Date    CHOL 205 (H) 02/21/2017    CHOL 158 11/09/2015    CHOL 152 10/23/2014    CHOL 206 (H) 03/05/2014    CHOL 205 (H) 01/22/2014    CHOL 208 (H) 08/28/2013     Lab Results   Component Value Date    TRIG 189 (H) 02/21/2017    TRIG 223 (H) 11/09/2015    TRIG 52 10/23/2014    TRIG 104 03/05/2014    TRIG 74 01/22/2014    TRIG 104 08/28/2013     Lab Results   Component Value Date    HDL 44 02/21/2017    HDL 40 (L) 11/09/2015    HDL 55 10/23/2014    HDL 54 03/05/2014    HDL 53 01/22/2014    HDL 72 (H) 08/28/2013 No components found for: LDLCAL  No results found for: Favian Sun RN     Patient discharged at 3985 via black and white cab to [facility]. \" Patient ambulatory. All belongings including discharge paperwork and filled medications sent with patient.

## 2021-11-02 NOTE — DISCHARGE INSTR - OTHER ORDERS
Make sure to take all medications prescribed by your Dr. Tito Cristobal not double up on doses. If you miss or skip a dose make sure to take the next scheduled dose. Make sure to not do any illicit drugs or alcohol. Make sure to attend all follow up appointments.

## 2021-11-03 ENCOUNTER — CLINICAL DOCUMENTATION (OUTPATIENT)
Dept: INTERNAL MEDICINE CLINIC | Age: 31
End: 2021-11-03

## 2021-11-03 NOTE — PROGRESS NOTES
Sw was asked to help with transportation for patients appointment tomorrow 11/4/2021. Sw complete a transportation request for patient with [facility].

## 2021-11-03 NOTE — ED PROVIDER NOTES
[facility]   Emergency Department  Faculty Attestation       I performed a history and physical examination of the patient and discussed management with the resident. I reviewed the residents note and agree with the documented findings including all diagnostic interpretations and plan of care. Any areas of disagreement are noted on the chart. I was personally present for the key portions of any procedures. I have documented in the chart those procedures where I was not present during the key portions. I have reviewed the emergency nurses triage note. I agree with the chief complaint, past medical history, past surgical history, allergies, medications, social and family history as documented unless otherwise noted below. Documentation of the HPI, Physical Exam and Medical Decision Making performed by scribmargo is based on my personal performance of the HPI, PE and MDM. For Physician Assistant/ Nurse Practitioner cases/documentation I have personally evaluated this patient and have completed at least one if not all key elements of the E/M (history, physical exam, and MDM). Additional findings are as noted. Pertinent Comments     Primary Care Physician: Jere Espana, APRN - CNP      ED Triage Vitals [10/29/21 0404]   BP Temp Temp Source Pulse Resp SpO2 Height Weight   121/78 97.2 °F (36.2 °C) Oral 81 20 94 % 5' 9\" (1.753 m) 250 lb (113.4 kg)        History/Physical: This is a 32 y.o. male who presents to the Emergency Department with complaint of right thigh pain due to a nerve similar has been in his thigh for over a year. States that he has a follow-up with them in some time, but he just wants it out. On exam patient is well-appearing no acute distress. Has no significant tenderness over the right thigh. There is no erythema or warmth. Is normal range of motion normal gait. MDM/Plan: Requesting that we take out the stimulator in his thigh. He has no signs of infection at this time. Had labs on other facility within the last 24 hours are all normal.  No further workup needed at this time. Symptomatic treatment and discharge.       Critical Care: None     Yvonne Meyers MD  Attending Emergency Physician         Yvonne Meyers MD  11/03/21 9258

## 2021-11-04 ENCOUNTER — CARE COORDINATION (OUTPATIENT)
Dept: CARE COORDINATION | Age: 31
End: 2021-11-04

## 2021-11-04 ENCOUNTER — VIRTUAL VISIT (OUTPATIENT)
Dept: INTERNAL MEDICINE CLINIC | Age: 31
End: 2021-11-04
Payer: MEDICARE

## 2021-11-04 DIAGNOSIS — G47.00 INSOMNIA, UNSPECIFIED TYPE: Primary | ICD-10-CM

## 2021-11-04 DIAGNOSIS — F11.10 OPIOID ABUSE (HCC): ICD-10-CM

## 2021-11-04 DIAGNOSIS — G89.4 CHRONIC PAIN SYNDROME: ICD-10-CM

## 2021-11-04 PROCEDURE — G8428 CUR MEDS NOT DOCUMENT: HCPCS | Performed by: NURSE PRACTITIONER

## 2021-11-04 PROCEDURE — G8417 CALC BMI ABV UP PARAM F/U: HCPCS | Performed by: NURSE PRACTITIONER

## 2021-11-04 PROCEDURE — G8484 FLU IMMUNIZE NO ADMIN: HCPCS | Performed by: NURSE PRACTITIONER

## 2021-11-04 PROCEDURE — 99213 OFFICE O/P EST LOW 20 MIN: CPT | Performed by: NURSE PRACTITIONER

## 2021-11-04 PROCEDURE — 4004F PT TOBACCO SCREEN RCVD TLK: CPT | Performed by: NURSE PRACTITIONER

## 2021-11-04 PROCEDURE — 1111F DSCHRG MED/CURRENT MED MERGE: CPT | Performed by: NURSE PRACTITIONER

## 2021-11-04 RX ORDER — GABAPENTIN 600 MG/1
600 TABLET ORAL 4 TIMES DAILY
Qty: 120 TABLET | Refills: 0 | Status: SHIPPED | OUTPATIENT
Start: 2021-11-04 | End: 2021-11-18 | Stop reason: SDUPTHER

## 2021-11-04 RX ORDER — BUPRENORPHINE AND NALOXONE 8; 2 MG/1; MG/1
2 FILM, SOLUBLE BUCCAL; SUBLINGUAL DAILY
Qty: 28 FILM | Refills: 0 | Status: SHIPPED | OUTPATIENT
Start: 2021-11-04 | End: 2021-11-18

## 2021-11-04 RX ORDER — BUPRENORPHINE AND NALOXONE 8; 2 MG/1; MG/1
2 FILM, SOLUBLE BUCCAL; SUBLINGUAL DAILY
Qty: 6 FILM | Refills: 0 | Status: SHIPPED | OUTPATIENT
Start: 2021-11-04 | End: 2021-11-04

## 2021-11-04 RX ORDER — SUVOREXANT 20 MG/1
20 TABLET, FILM COATED ORAL NIGHTLY
Qty: 30 TABLET | Refills: 0 | Status: ON HOLD | OUTPATIENT
Start: 2021-11-04 | End: 2021-11-21

## 2021-11-04 RX ORDER — TIZANIDINE 4 MG/1
4 TABLET ORAL EVERY 8 HOURS PRN
Qty: 30 TABLET | Refills: 0 | Status: SHIPPED | OUTPATIENT
Start: 2021-11-04 | End: 2021-11-18 | Stop reason: SDUPTHER

## 2021-11-04 NOTE — CARE COORDINATION
Spoke to pt briefly. Pt was admitted to [facility] psych from 10/30-11/2 for depression with suicidal ideation. Reports that he is feeling a little better since d/c. Had VV with PCP earlier today. Per hospital d/c instructions pt was to f/u with [facility]. Pt reports that he will not be f/u with them. Planning on coming back to [facility] (currently in [place]). States that he is working with Romi Robles to find a drug and alcohol program for him to get in to in [facility]. Pt then states that he is in the middle of something and needs to go. Will attempt call back later today.

## 2021-11-04 NOTE — PROGRESS NOTES
Jen Cannon (:  1990) is a 32 y.o. male,{New vs Established:[id number]::\"Established patient\"}, here for evaluation of the following chief complaint(s): No chief complaint on file. ASSESSMENT/PLAN:  {There are no diagnoses linked to this encounter. (Refresh or delete this SmartLink)}    No follow-ups on file. SUBJECTIVE/OBJECTIVE:    Was depressed- denies any suicidal thoughts     [address] to [address] [place] [address] raided- then to [facility] [email or web address], lost job- went back to [facility] [place] to [facility]- then to -     Staying at brothers in [facility] [address]    No flowsheet data found. Physical Exam    {Virtual visit PE with detailed exam Optional:[id number]}    {Time Documentation Optional:[id number]}      Jen Cannon, was evaluated through a synchronous (real-time) audio-video encounter. The patient (or guardian if applicable) is aware that this is a billable service. Verbal consent to proceed has been obtained within the past 12 months. The visit was conducted pursuant to the emergency declaration under the [facility], [address] [place] [address] authority and the [employer] General Act. Patient identification was verified, and a caregiver was present when appropriate. The patient was located in a state where the provider was credentialed to provide care. An electronic signature was used to authenticate this note.     --Sonny Espinoza, MASSIEL - CNP
[] Abnormal -     Eyes:   EOM    [x]  Normal    [] Abnormal -   Sclera  [x]  Normal    [] Abnormal -          Discharge [x]  None visible   [] Abnormal -     HENT: [x] Normocephalic, atraumatic  [] Abnormal -   [x] Mouth/Throat: Mucous membranes are moist    External Ears [x] Normal  [] Abnormal -    Neck: [x] No visualized mass [] Abnormal -     Pulmonary/Chest: [x] Respiratory effort normal   [x] No visualized signs of difficulty breathing or respiratory distress        [] Abnormal -      Musculoskeletal:   [x] Normal gait with no signs of ataxia         [x] Normal range of motion of neck        [] Abnormal -     Neurological:        [x] No Facial Asymmetry (Cranial nerve 7 motor function) (limited exam due to video visit)          [x] No gaze palsy        [] Abnormal -          Skin:        [x] No significant exanthematous lesions or discoloration noted on facial skin         [] Abnormal -            Psychiatric:       [x] Normal Affect [] Abnormal -        [x] No Hallucinations    Other pertinent observable physical exam findings:- sarabjit Mathis, was evaluated through a synchronous (real-time) audio-video encounter. The patient (or guardian if applicable) is aware that this is a billable service. Verbal consent to proceed has been obtained within the past 12 months. The visit was conducted pursuant to the emergency declaration under the [address] [place] [address] authority and the [employer] General Act. Patient identification was verified, and a caregiver was present when appropriate. The patient was located in a state where the provider was credentialed to provide care. An electronic signature was used to authenticate this note.     --Nathaniel Glover, MASSIEL - CNP

## 2021-11-10 ENCOUNTER — CARE COORDINATION (OUTPATIENT)
Dept: CARE COORDINATION | Age: 31
End: 2021-11-10

## 2021-11-10 NOTE — CARE COORDINATION
Attempted to reach Lian Bobo today for care coordination f/u. No answer. Message left to return call.

## 2021-11-13 ENCOUNTER — HOSPITAL ENCOUNTER (EMERGENCY)
Age: 31
Discharge: HOME | End: 2021-11-13
Attending: EMERGENCY MEDICINE
Payer: MEDICARE

## 2021-11-13 VITALS
BODY MASS INDEX: 37.03 KG/M2 | DIASTOLIC BLOOD PRESSURE: 73 MMHG | HEART RATE: 79 BPM | WEIGHT: 250 LBS | SYSTOLIC BLOOD PRESSURE: 116 MMHG | RESPIRATION RATE: 16 BRPM | TEMPERATURE: 97.5 F | OXYGEN SATURATION: 95 % | HEIGHT: 69 IN

## 2021-11-13 DIAGNOSIS — F32.A DEPRESSION, UNSPECIFIED DEPRESSION TYPE: Primary | ICD-10-CM

## 2021-11-13 DIAGNOSIS — R21 RASH AND OTHER NONSPECIFIC SKIN ERUPTION: ICD-10-CM

## 2021-11-13 PROCEDURE — 99282 EMERGENCY DEPT VISIT SF MDM: CPT

## 2021-11-13 RX ORDER — DIPHENHYDRAMINE HCL 25 MG
25 CAPSULE ORAL EVERY 6 HOURS PRN
Qty: 20 CAPSULE | Refills: 0 | Status: SHIPPED | OUTPATIENT
Start: 2021-11-13 | End: 2021-11-18

## 2021-11-13 ASSESSMENT — ENCOUNTER SYMPTOMS
EYE PAIN: 0
VOMITING: 0
DIARRHEA: 0
CONSTIPATION: 0
NAUSEA: 0
CHEST TIGHTNESS: 0
COUGH: 0
SHORTNESS OF BREATH: 0
ABDOMINAL PAIN: 0
SORE THROAT: 0
BACK PAIN: 0

## 2021-11-13 ASSESSMENT — PAIN SCALES - GENERAL: PAINLEVEL_OUTOF10: 3

## 2021-11-13 ASSESSMENT — PAIN DESCRIPTION - LOCATION: LOCATION: ABDOMEN

## 2021-11-14 NOTE — ED NOTES
Writer spoke to patient and provided outpatient mental health resources. Patient states he use to follow up with [facility], but is unsure if he wants to follow up with them or the [facility]. Writer provided patient with the walk in clinic information for [facility], Monday through Friday 8:am to 1pm. Cheriser also provided patient with [facility] crisis care information and location.

## 2021-11-15 ENCOUNTER — CLINICAL DOCUMENTATION (OUTPATIENT)
Dept: INTERNAL MEDICINE CLINIC | Age: 31
End: 2021-11-15

## 2021-11-15 NOTE — PROGRESS NOTES
Patient called to ask if he could utilize the transportation to get to his appointment. Patient has been staying in [facility] with his brother. Sw will place request for transportation with [facility]. Patient has a goal to return to living in [facility] though struggles due to not having support within [facility]. Patient and sw have discussed patient transferring services to [facility] area. Patient denied at this time at as he wants to return to [facility]. Patient has stayed at the [facility] and [facility]. Patient does not appear to be compliant with  treatment at this time.

## 2021-11-16 VITALS
DIASTOLIC BLOOD PRESSURE: 103 MMHG | SYSTOLIC BLOOD PRESSURE: 179 MMHG | RESPIRATION RATE: 16 BRPM | HEART RATE: 114 BPM | HEIGHT: 69 IN | BODY MASS INDEX: 35.4 KG/M2 | TEMPERATURE: 98.3 F | OXYGEN SATURATION: 96 % | WEIGHT: 239 LBS

## 2021-11-16 ASSESSMENT — PAIN SCALES - GENERAL: PAINLEVEL_OUTOF10: 5

## 2021-11-17 ENCOUNTER — HOSPITAL ENCOUNTER (EMERGENCY)
Age: 31
Discharge: LEFT BEFORE DISCHARGE | End: 2021-11-17

## 2021-11-17 NOTE — ED NOTES
Pt eloped after stating he did not want to be in room 19. Pt made aware that there are no other rooms currently available, and pt decided to leave before being seen by physician.       Jacky Art RN  11/17/21 0110

## 2021-11-18 ENCOUNTER — HOSPITAL ENCOUNTER (EMERGENCY)
Age: 31
Discharge: HOME | End: 2021-11-18
Attending: EMERGENCY MEDICINE
Payer: MEDICARE

## 2021-11-18 ENCOUNTER — OFFICE VISIT (OUTPATIENT)
Dept: INTERNAL MEDICINE CLINIC | Age: 31
End: 2021-11-18
Payer: MEDICARE

## 2021-11-18 VITALS
BODY MASS INDEX: 35.7 KG/M2 | DIASTOLIC BLOOD PRESSURE: 81 MMHG | WEIGHT: 241 LBS | SYSTOLIC BLOOD PRESSURE: 133 MMHG | HEART RATE: 74 BPM | HEIGHT: 69 IN | TEMPERATURE: 97 F

## 2021-11-18 VITALS
RESPIRATION RATE: 18 BRPM | DIASTOLIC BLOOD PRESSURE: 68 MMHG | TEMPERATURE: 97.7 F | WEIGHT: 235 LBS | HEART RATE: 74 BPM | OXYGEN SATURATION: 95 % | SYSTOLIC BLOOD PRESSURE: 109 MMHG | BODY MASS INDEX: 34.7 KG/M2

## 2021-11-18 DIAGNOSIS — G89.4 CHRONIC PAIN SYNDROME: ICD-10-CM

## 2021-11-18 DIAGNOSIS — F11.20 SEVERE OPIOID USE DISORDER (HCC): Primary | ICD-10-CM

## 2021-11-18 DIAGNOSIS — R10.9 UNDIFFERENTIATED ABDOMINAL PAIN: Primary | ICD-10-CM

## 2021-11-18 LAB
ABSOLUTE EOS #: 0.33 K/UL (ref 0–0.44)
ABSOLUTE IMMATURE GRANULOCYTE: 0.02 K/UL (ref 0–0.3)
ABSOLUTE LYMPH #: 1.68 K/UL (ref 1.1–3.7)
ABSOLUTE MONO #: 0.68 K/UL (ref 0.1–1.2)
ALBUMIN SERPL-MCNC: 3.8 G/DL (ref 3.5–5.2)
ALBUMIN/GLOBULIN RATIO: ABNORMAL (ref 1–2.5)
ALCOHOL URINE: ABNORMAL
ALP BLD-CCNC: 60 U/L (ref 40–129)
ALT SERPL-CCNC: 10 U/L (ref 5–41)
AMPHETAMINE SCREEN, URINE: ABNORMAL
ANION GAP SERPL CALCULATED.3IONS-SCNC: 7 MMOL/L (ref 9–17)
AST SERPL-CCNC: 24 U/L
BARBITURATE SCREEN, URINE: ABNORMAL
BASOPHILS # BLD: 0 % (ref 0–2)
BASOPHILS ABSOLUTE: <0.03 K/UL (ref 0–0.2)
BENZODIAZEPINE SCREEN, URINE: ABNORMAL
BILIRUB SERPL-MCNC: 0.2 MG/DL (ref 0.3–1.2)
BUN BLDV-MCNC: 20 MG/DL (ref 6–20)
BUN/CREAT BLD: 22 (ref 9–20)
BUPRENORPHINE URINE: ABNORMAL
CALCIUM SERPL-MCNC: 8.6 MG/DL (ref 8.6–10.4)
CHLORIDE BLD-SCNC: 102 MMOL/L (ref 98–107)
CO2: 27 MMOL/L (ref 20–31)
COCAINE METABOLITE SCREEN URINE: ABNORMAL
CREAT SERPL-MCNC: 0.89 MG/DL (ref 0.7–1.2)
DIFFERENTIAL TYPE: ABNORMAL
EOSINOPHILS RELATIVE PERCENT: 5 % (ref 1–4)
FENTANYL SCREEN, URINE: ABNORMAL
GABAPENTIN SCREEN, URINE: ABNORMAL
GFR AFRICAN AMERICAN: >60 ML/MIN
GFR NON-AFRICAN AMERICAN: >60 ML/MIN
GFR SERPL CREATININE-BSD FRML MDRD: ABNORMAL ML/MIN/{1.73_M2}
GFR SERPL CREATININE-BSD FRML MDRD: ABNORMAL ML/MIN/{1.73_M2}
GLUCOSE BLD-MCNC: 117 MG/DL (ref 70–99)
HCT VFR BLD CALC: 39.1 % (ref 40.7–50.3)
HEMOGLOBIN: 12.8 G/DL (ref 13–17)
IMMATURE GRANULOCYTES: 0 %
LIPASE: 18 U/L (ref 13–60)
LYMPHOCYTES # BLD: 27 % (ref 24–43)
MCH RBC QN AUTO: 29.1 PG (ref 25.2–33.5)
MCHC RBC AUTO-ENTMCNC: 32.7 G/DL (ref 28.4–34.8)
MCV RBC AUTO: 88.9 FL (ref 82.6–102.9)
MDMA URINE: ABNORMAL
METHADONE SCREEN, URINE: ABNORMAL
METHAMPHETAMINE, URINE: ABNORMAL
MONOCYTES # BLD: 11 % (ref 3–12)
NRBC AUTOMATED: 0 PER 100 WBC
OPIATE SCREEN URINE: ABNORMAL
OXYCODONE SCREEN URINE: ABNORMAL
PDW BLD-RTO: 13 % (ref 11.8–14.4)
PHENCYCLIDINE SCREEN URINE: ABNORMAL
PLATELET # BLD: 197 K/UL (ref 138–453)
PLATELET ESTIMATE: ABNORMAL
PMV BLD AUTO: 9.6 FL (ref 8.1–13.5)
POTASSIUM SERPL-SCNC: 4 MMOL/L (ref 3.7–5.3)
PROPOXYPHENE SCREEN, URINE: ABNORMAL
RBC # BLD: 4.4 M/UL (ref 4.21–5.77)
RBC # BLD: ABNORMAL 10*6/UL
SEG NEUTROPHILS: 57 % (ref 36–65)
SEGMENTED NEUTROPHILS ABSOLUTE COUNT: 3.51 K/UL (ref 1.5–8.1)
SODIUM BLD-SCNC: 136 MMOL/L (ref 135–144)
SYNTHETIC CANNABINOIDS(K2) SCREEN, URINE: ABNORMAL
THC SCREEN, URINE: ABNORMAL
TOTAL PROTEIN: 6.8 G/DL (ref 6.4–8.3)
TRAMADOL SCREEN URINE: ABNORMAL
TRICYCLIC ANTIDEPRESSANTS, UR: ABNORMAL
WBC # BLD: 6.2 K/UL (ref 3.5–11.3)
WBC # BLD: ABNORMAL 10*3/UL

## 2021-11-18 PROCEDURE — 80053 COMPREHEN METABOLIC PANEL: CPT

## 2021-11-18 PROCEDURE — G8417 CALC BMI ABV UP PARAM F/U: HCPCS | Performed by: NURSE PRACTITIONER

## 2021-11-18 PROCEDURE — 96375 TX/PRO/DX INJ NEW DRUG ADDON: CPT

## 2021-11-18 PROCEDURE — 6360000002 HC RX W HCPCS: Performed by: EMERGENCY MEDICINE

## 2021-11-18 PROCEDURE — 2580000003 HC RX 258: Performed by: EMERGENCY MEDICINE

## 2021-11-18 PROCEDURE — 99214 OFFICE O/P EST MOD 30 MIN: CPT | Performed by: NURSE PRACTITIONER

## 2021-11-18 PROCEDURE — 85025 COMPLETE CBC W/AUTO DIFF WBC: CPT

## 2021-11-18 PROCEDURE — 83690 ASSAY OF LIPASE: CPT

## 2021-11-18 PROCEDURE — 4004F PT TOBACCO SCREEN RCVD TLK: CPT | Performed by: NURSE PRACTITIONER

## 2021-11-18 PROCEDURE — 80305 DRUG TEST PRSMV DIR OPT OBS: CPT | Performed by: NURSE PRACTITIONER

## 2021-11-18 PROCEDURE — G8427 DOCREV CUR MEDS BY ELIG CLIN: HCPCS | Performed by: NURSE PRACTITIONER

## 2021-11-18 PROCEDURE — 99283 EMERGENCY DEPT VISIT LOW MDM: CPT

## 2021-11-18 PROCEDURE — 96374 THER/PROPH/DIAG INJ IV PUSH: CPT

## 2021-11-18 PROCEDURE — 1111F DSCHRG MED/CURRENT MED MERGE: CPT | Performed by: NURSE PRACTITIONER

## 2021-11-18 PROCEDURE — G8484 FLU IMMUNIZE NO ADMIN: HCPCS | Performed by: NURSE PRACTITIONER

## 2021-11-18 RX ORDER — DIPHENHYDRAMINE HYDROCHLORIDE 50 MG/ML
25 INJECTION INTRAMUSCULAR; INTRAVENOUS EVERY 6 HOURS PRN
Status: DISCONTINUED | OUTPATIENT
Start: 2021-11-18 | End: 2021-11-18 | Stop reason: HOSPADM

## 2021-11-18 RX ORDER — PROCHLORPERAZINE EDISYLATE 5 MG/ML
10 INJECTION INTRAMUSCULAR; INTRAVENOUS ONCE
Status: COMPLETED | OUTPATIENT
Start: 2021-11-18 | End: 2021-11-18

## 2021-11-18 RX ORDER — 0.9 % SODIUM CHLORIDE 0.9 %
1000 INTRAVENOUS SOLUTION INTRAVENOUS ONCE
Status: COMPLETED | OUTPATIENT
Start: 2021-11-18 | End: 2021-11-18

## 2021-11-18 RX ORDER — PANTOPRAZOLE SODIUM 40 MG/1
40 TABLET, DELAYED RELEASE ORAL
Qty: 30 TABLET | Refills: 0 | Status: ON HOLD | OUTPATIENT
Start: 2021-11-18 | End: 2021-11-22 | Stop reason: HOSPADM

## 2021-11-18 RX ORDER — BUPRENORPHINE AND NALOXONE 8; 2 MG/1; MG/1
2 FILM, SOLUBLE BUCCAL; SUBLINGUAL DAILY
Qty: 28 FILM | Refills: 0 | Status: CANCELLED | OUTPATIENT
Start: 2021-11-18 | End: 2021-12-02

## 2021-11-18 RX ORDER — PROMETHAZINE HYDROCHLORIDE 25 MG/1
25 TABLET ORAL 4 TIMES DAILY PRN
Qty: 30 TABLET | Refills: 0 | Status: ON HOLD | OUTPATIENT
Start: 2021-11-18 | End: 2021-11-21

## 2021-11-18 RX ORDER — TIZANIDINE 4 MG/1
4 TABLET ORAL EVERY 8 HOURS PRN
Qty: 30 TABLET | Refills: 0 | Status: ON HOLD | OUTPATIENT
Start: 2021-11-18 | End: 2021-11-21

## 2021-11-18 RX ORDER — BUPRENORPHINE AND NALOXONE 12; 3 MG/1; MG/1
1 FILM, SOLUBLE BUCCAL; SUBLINGUAL DAILY
Qty: 14 FILM | Refills: 0 | Status: ON HOLD | OUTPATIENT
Start: 2021-11-18 | End: 2021-11-21

## 2021-11-18 RX ORDER — GABAPENTIN 600 MG/1
600 TABLET ORAL 4 TIMES DAILY
Qty: 120 TABLET | Refills: 0 | Status: SHIPPED | OUTPATIENT
Start: 2021-11-18 | End: 2021-12-27

## 2021-11-18 RX ADMIN — PROCHLORPERAZINE EDISYLATE 10 MG: 5 INJECTION INTRAMUSCULAR; INTRAVENOUS at 02:06

## 2021-11-18 RX ADMIN — DIPHENHYDRAMINE HYDROCHLORIDE 25 MG: 50 INJECTION INTRAMUSCULAR; INTRAVENOUS at 02:07

## 2021-11-18 RX ADMIN — SODIUM CHLORIDE 1000 ML: 9 INJECTION, SOLUTION INTRAVENOUS at 02:07

## 2021-11-18 ASSESSMENT — PAIN DESCRIPTION - LOCATION: LOCATION: ABDOMEN;BACK;GENERALIZED

## 2021-11-18 ASSESSMENT — PAIN DESCRIPTION - DESCRIPTORS: DESCRIPTORS: ACHING

## 2021-11-18 ASSESSMENT — PAIN SCALES - GENERAL: PAINLEVEL_OUTOF10: 10

## 2021-11-18 ASSESSMENT — PAIN DESCRIPTION - PAIN TYPE: TYPE: ACUTE PAIN

## 2021-11-18 NOTE — ED PROVIDER NOTES
EMERGENCY DEPARTMENT ENCOUNTER    Pt Name: Arian Garcias  MRN: [id number]  [place] 1990  Date of evaluation: 11/18/21  CHIEF COMPLAINT       Chief Complaint   Patient presents with    Abdominal Pain     umbilical area    Back Pain    Generalized Body Aches    Dizziness     HISTORY OF PRESENT ILLNESS   Patient is a 80-year-old male with multiple committees listed below including chronic abdominal pain, GERD, who presents ED for evaluation of headache, nausea vomiting and abdominal pain. Symptoms are chronic. Seems identical location, quality and severity as though symptoms he presented to [facility] 5 days ago. He was advised to follow-up with GI however is not done so. No other issues at this time. ROS:  No fevers, cough, shortness of breath, chest pain, changes in urine or stool. REVIEW OF SYSTEMS     Review of Systems   All other systems reviewed and are negative.     PASTMEDICAL HISTORY     Past Medical History:   Diagnosis Date    Anxiety     Arthritis     Asthma     Bipolar I disorder, most recent episode (or current) depressed, unspecified 9/12/2014    Clostridium difficile infection     COPD (chronic obstructive pulmonary disease) (HCC)     Depression     Disease of blood and blood forming organ     Eczema     Fracture, metacarpal     R 4th and 5th    Gastric ulcer     Gastritis 06/13/2018    GERD (gastroesophageal reflux disease)     GI bleed     H. 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[address]     Substance abuse ([address]     Suicidal ideation     Suicide attempt by hanging ([address]     Tobacco dependence     Ulcerative colitis ([address]     UTI (urinary tract infection)      SURGICAL HISTORY Past Surgical History:   Procedure Laterality Date    ABDOMEN SURGERY      upper GI scope 7/7/2015    BRONCHOSCOPY      CHOLECYSTECTOMY, LAPAROSCOPIC  07/14/2017    surgery performed at [address], COLON, DIAGNOSTIC      OTHER SURGICAL HISTORY      lumbar puncture    [facility] COLONOSCOPY W/BIOPSY SINGLE/MULTIPLE  8/9/2017    COLONOSCOPY WITH BIOPSY performed by Severino Cade MD at [facility]    [place] EGD TRANSORAL BIOPSY SINGLE/MULTIPLE N/A 3/20/2017    EGD BIOPSY performed by Lois Edwards MD at [facility]    [place] ESOPHAGOGASTRODUODENOSCOPY TRANSORAL DIAGNOSTIC N/A 8/9/2017    EGD ESOPHAGOGASTRODUODENOSCOPY performed by Severino Cade MD at [address] N/A 3/20/2017    SIGMOIDOSCOPY DIAGNOSTIC FLEXIBLE performed by Lois Edwards MD at [facility]  2/4/16    UPPER GASTROINTESTINAL ENDOSCOPY N/A 6/13/2018    GASTRITIS    UPPER GASTROINTESTINAL ENDOSCOPY N/A 9/20/2018    EGD BIOPSY performed by Jerald Cordero MD at [facility]       Previous Medications    ALUMINUM & MAGNESIUM HYDROXIDE-SIMETHICONE (MAALOX) 200-200-20 MG/5ML SUSP SUSPENSION    Take 5 mLs by mouth every 6 hours as needed for Indigestion    BUPRENORPHINE-NALOXONE (SUBOXONE) 8-2 MG FILM SL FILM    Place 2 Film under the tongue daily for 14 days. CLONIDINE (CATAPRES) 0.1 MG TABLET    Take 1 tablet by mouth 3 times daily    DIPHENHYDRAMINE (BENADRYL) 25 MG CAPSULE    Take 1 capsule by mouth every 6 hours as needed for Itching    GABAPENTIN (NEURONTIN) 600 MG TABLET    Take 1 tablet by mouth 4 times daily for 30 days.     HYDROXYZINE (ATARAX) 50 MG TABLET    Take 50 mg by mouth 2 times daily    METFORMIN (GLUCOPHAGE) 500 MG TABLET    Take 1 tablet by mouth 2 times daily (with meals)    MULTIPLE VITAMINS-MINERALS (THERAPEUTIC MULTIVITAMIN-MINERALS) TABLET    Take 1 tablet by mouth daily PANTOPRAZOLE (PROTONIX) 40 MG TABLET    Take 1 tablet by mouth every morning (before breakfast)    QUETIAPINE (SEROQUEL) 100 MG TABLET    Take 1 tablet by mouth nightly    SUCRALFATE (CARAFATE) 1 GM TABLET    Take 1 tablet by mouth 4 times daily    SUVOREXANT (BELSOMRA) 20 MG TABS    Take 1 tablet by mouth nightly for 30 days. TIZANIDINE (ZANAFLEX) 4 MG TABLET    Take 1 tablet by mouth every 8 hours as needed (muscle spasms)    TRAZODONE (DESYREL) 50 MG TABLET    Take 1 tablet by mouth nightly as needed for Sleep     ALLERGIES     is allergic to dicyclomine, famotidine, geodon [ziprasidone hcl], haloperidol, iv dye [iodides], olanzapine, reglan [metoclopramide], ibuprofen, aspirin, dicyclomine hcl, flagyl [metronidazole], iodine, and mirtazapine. FAMILY HISTORY     He indicated that his mother is alive. He indicated that his father is alive. He indicated that the status of his sister is unknown. He indicated that the status of his other is unknown. He indicated that the status of his paternal cousin is unknown. SOCIAL HISTORY       Social History     Tobacco Use    Smoking status: Current Every Day Smoker     Packs/day: 0.50     Years: 15.00     Pack years: 7.50     Types: Cigarettes    Smokeless tobacco: Never Used   Vaping Use    Vaping Use: Former   Substance Use Topics    Alcohol use: Not Currently     Comment: social ETOH use    Drug use: Not Currently     Types: Opiates      Comment: last used pain pill/fentanyl 2/18/21     PHYSICAL EXAM     INITIAL VITALS: /68   Pulse 74   Temp 97.7 °F (36.5 °C) (Oral)   Resp 18   Wt 235 lb (106.6 kg)   SpO2 95%   BMI 34.70 kg/m²    Physical Exam  HENT:      Head: Normocephalic. Right Ear: External ear normal.      Left Ear: External ear normal.      Nose: Nose normal.   Eyes:      Extraocular Movements: Extraocular movements intact. Conjunctiva/sclera: Conjunctivae normal.      Pupils: Pupils are equal, round, and reactive to light. Comments: Injected conjunctiva bilaterally. Cardiovascular:      Rate and Rhythm: Normal rate. Pulmonary:      Effort: Pulmonary effort is normal.   Abdominal:      General: Abdomen is flat. Tenderness: There is abdominal tenderness. There is no guarding or rebound. Skin:     General: Skin is dry. Neurological:      Mental Status: He is alert. Mental status is at baseline. Psychiatric:         Mood and Affect: Mood normal.         Behavior: Behavior normal.         MEDICAL DECISION MAKING:   The patient is hemodynamically stable, afebrile, nontoxic-appearing. Physical exam notable for injected conjunctiva in generalized abdominal tenderness without rebound or guarding. Based on history and exam likely acute on chronic gastritis, cyclic vomiting syndrome, PUD. ED plan for Benadryl, Compazine, fluids, reassess. DIAGNOSTIC RESULTS   EKG:All EKG's are interpreted by the Emergency Department Physician who either signs or Co-signs this chart in the absence of a cardiologist.        RADIOLOGY:All plain film, CT, MRI, and formal ultrasound images (except ED bedside ultrasound) are read by the radiologist, see reports below, unless otherwisenoted in MDM or here. No orders to display     LABS: All lab results were reviewed by myself, and all abnormals are listed below. Labs Reviewed   CBC WITH AUTO DIFFERENTIAL - Abnormal; Notable for the following components:       Result Value    Hemoglobin 12.8 (*)     Hematocrit 39.1 (*)     Eosinophils % 5 (*)     All other components within normal limits   COMPREHENSIVE METABOLIC PANEL W/ REFLEX TO MG FOR LOW K - Abnormal; Notable for the following components:    Glucose 117 (*)     Bun/Cre Ratio 22 (*)     Anion Gap 7 (*)     Total Bilirubin 0.20 (*)     All other components within normal limits   LIPASE       EMERGENCY DEPARTMENTCOURSE:   Patient did well in the ED. Symptoms resolved with treatment plan.   He was able to sleep most of the evening in his exam bed without pain. No further work-up indicated at this time. Nursing notes reviewed. At this time this is what I find, the patient appears well and does not appear sick or toxic. I gave my usual and customary discussion of the risks and benefits of discharge versus admission. I answered the patient's questions. I gave the patient strict return precautions. Patient expressed understanding of the discharge instructions. The care is provided during an unprecedented national emergency due to the novel coronavirus, COVID-19. Dictated but not reviewed. Vitals:    Vitals:    11/18/21 0058   BP: 109/68   Pulse: 74   Resp: 18   Temp: 97.7 °F (36.5 °C)   TempSrc: Oral   SpO2: 95%   Weight: 235 lb (106.6 kg)       The patient was given the following medications while in the emergency department:  Orders Placed This Encounter   Medications    0.9 % sodium chloride bolus    prochlorperazine (COMPAZINE) injection 10 mg    diphenhydrAMINE (BENADRYL) injection 25 mg     CONSULTS:  None    FINAL IMPRESSION      1.  Undifferentiated abdominal pain          DISPOSITION/PLAN   DISPOSITION Decision To Discharge 11/18/2021 05:05:59 AM      PATIENT REFERRED TO:  Jere Espana, APRN - CNP  [address]  [phone]    In 2 days      DISCHARGE MEDICATIONS:  New Prescriptions    No medications on file     Arianna Noble MD  Attending Emergency Physician                    Yaritza Mcclure MD  11/18/21 5329

## 2021-11-18 NOTE — PROGRESS NOTES
[facility]  [address]  Dept: [phone]  Dept Fax: [phone]: [phone]     Visit Date:  11/18/2021    Patient:  Kathy House  YOB: 1990    HPI:     Chief Complaint   Patient presents with    Drug Problem     Li Toledo presents today for medical evaluation of severe opioid use disorder. I last seen him 2 weeks ago.      Urges and cravings controlled with Suboxone 8 mg BID, however he would like to switch to 12 mg daily. States that it seems to help more.      Fibromyalgia controlled with gabapentin. History of juvenile RA.      Insomnia controlled with Belsomra. Urine positive for buprenorphine and cocaine. Denies any cocaine use. Will send for comprehensive analysis. Is active in counseling with [facility]    Medications    Current Outpatient Medications:     gabapentin (NEURONTIN) 600 MG tablet, Take 1 tablet by mouth 4 times daily for 30 days. , Disp: 120 tablet, Rfl: 0    buprenorphine-naloxone (SUBOXONE) 12-3 MG sublingual film, Place 1 Film under the tongue daily for 25 days. , Disp: 25 Film, Rfl: 0    cloNIDine (CATAPRES) 0.1 MG tablet, Take 1 tablet by mouth 3 times daily, Disp: 90 tablet, Rfl: 0    promethazine (PHENERGAN) 12.5 MG tablet, Take 1 tablet by mouth 3 times daily as needed for Nausea, Disp: 30 tablet, Rfl: 0    metFORMIN (GLUCOPHAGE) 500 MG tablet, Take 1 tablet by mouth 2 times daily (with meals), Disp: 60 tablet, Rfl: 0    chlorproMAZINE (THORAZINE) 50 MG tablet, Take 1 tablet by mouth 2 times daily as needed (anxiety), Disp: 10 tablet, Rfl: 0    QUEtiapine (SEROQUEL) 100 MG tablet, Take 1 tablet by mouth nightly, Disp: 30 tablet, Rfl: 0    sucralfate (CARAFATE) 1 GM tablet, Take 1 tablet by mouth 4 times daily, Disp: 120 tablet, Rfl: 3    pantoprazole (PROTONIX) 40 MG tablet, Take 1 tablet by mouth every morning (before breakfast), Disp: 30 tablet, Rfl: 3    ascorbic acid (VITAMIN C) 500 MG tablet, Take 1 tablet by mouth 2 times daily for 7 days, Disp: 14 tablet, Rfl: 0    traZODone (DESYREL) 50 MG tablet, Take 1 tablet by mouth nightly as needed for Sleep, Disp: 30 tablet, Rfl: 0    The patient is allergic to dicyclomine, famotidine, geodon [ziprasidone hcl], haloperidol, iv dye [iodides], olanzapine, reglan [metoclopramide], ibuprofen, aspirin, dicyclomine hcl, hydroxyzine hcl, iodine, metronidazole, mirtazapine, and ziprasidone. Past Medical History  Osiris Dang  has a past medical history of Anxiety, Arthritis, Asthma, Bipolar I disorder, most recent episode (or current) depressed, unspecified, Clostridium difficile infection, COPD (chronic obstructive pulmonary disease) ([address] Depression, Disease of blood and blood forming organ, Eczema, Fracture, metacarpal, Gastric ulcer, Gastritis, GERD (gastroesophageal reflux disease), GI bleed, H. pylori infection, H/O blood clots, Head injury, Headache, Insomnia, Juvenile rheumatoid arthritis ([address] Neuromuscular disorder ([address] PFAPA syndrome ([address] PUD (peptic ulcer disease), Rheumatoid arthritis ([address] Rheumatoid arthritis(714.0), Severe recurrent major depression without psychotic features ([address] Sleep apnea, 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[address] Substance abuse ([address] Suicidal ideation, Suicide attempt by hanging ([address] Tobacco dependence, Ulcerative colitis ([address] and UTI (urinary tract infection). Past Surgical History  The patient  has a past surgical history that includes Colonoscopy; bronchoscopy; other surgical history; Upper gastrointestinal endoscopy (2/4/16); pr egd transoral biopsy single/multiple (N/A, 3/20/2017); sigmoidoscopy (N/A, 3/20/2017); Cholecystectomy, laparoscopic (07/14/2017); pr esophagogastroduodenoscopy transoral diagnostic (N/A, 8/9/2017); pr colonoscopy w/biopsy single/multiple (8/9/2017); Abdomen surgery; Upper gastrointestinal endoscopy (N/A, 6/13/2018);  Upper gastrointestinal endoscopy (N/A, 9/20/2018); and Endoscopy, colon, diagnostic. Family History  This patient's family history includes Alcohol Abuse in his father; Arthritis in his father and mother; Colon Cancer (age of onset: 43) in his paternal cousin; Depression in his brother and father; Diabetes in his brother and father; High Blood Pressure in his father; Mental Illness in his brother; Migraines in his brother, father, mother, and sister; Other in his brother, brother, father, mother, and sister; Stroke in an other family member. Social History  Dougie Ibanez  reports that he has been smoking cigarettes. He has a 7.50 pack-year smoking history. He has never used smokeless tobacco. He reports current alcohol use. He reports current drug use. Drugs: Opiates  and Cocaine. Health Maintenance:    Health Maintenance   Topic Date Due    Varicella vaccine (1 of 2 - 2-dose childhood series) Never done    COVID-19 Vaccine (1) Never done    DTaP/Tdap/Td vaccine (1 - Tdap) Never done    A1C test (Diabetic or Prediabetic)  10/23/2015    Flu vaccine (1) 09/01/2021    Pneumococcal 0-64 years Vaccine (2 of 2 - PPSV23) 10/20/2055    Hepatitis C screen  Completed    HIV screen  Completed    Hepatitis A vaccine  Aged Out    Hepatitis B vaccine  Aged Out    Hib vaccine  Aged Out    Meningococcal (ACWY) vaccine  Aged Out       Subjective:      Review of Systems   Cardiovascular: Negative for palpitations. Neurological: Positive for headaches. Negative for dizziness. Psychiatric/Behavioral: Negative for dysphoric mood and suicidal ideas. All other systems reviewed and are negative. Objective:     /81 (Site: Right Lower Arm, Position: Sitting, Cuff Size: Medium Adult)   Pulse 74   Temp 97 °F (36.1 °C) (Temporal)   Ht 5' 9\" (1.753 m)   Wt 241 lb (109.3 kg)   BMI 35.59 kg/m²     Physical Exam  Vitals reviewed. Constitutional:       General: He is not in acute distress.      Appearance: Normal Dispense: 120 tablet; Refill: 0      Return in about 2 weeks (around 12/2/2021). Patient given educational materials - see patient instructions. Discussed use, benefit, and side effects of prescribed medications. All patient questions answered. Pt voiced understanding. Reviewed health maintenance.        Electronically signed MASSIEL Levy CNP on 11/18/21 at 11:43 AM EST

## 2021-11-19 ENCOUNTER — HOSPITAL ENCOUNTER (EMERGENCY)
Age: 31
Discharge: HOME | End: 2021-11-19
Attending: EMERGENCY MEDICINE
Payer: MEDICARE

## 2021-11-19 ENCOUNTER — HOSPITAL ENCOUNTER (EMERGENCY)
Age: 31
Discharge: LEFT BEFORE DISCHARGE | End: 2021-11-19

## 2021-11-19 ENCOUNTER — CARE COORDINATION (OUTPATIENT)
Dept: CARE COORDINATION | Age: 31
End: 2021-11-19

## 2021-11-19 VITALS
HEART RATE: 107 BPM | WEIGHT: 250 LBS | DIASTOLIC BLOOD PRESSURE: 93 MMHG | RESPIRATION RATE: 21 BRPM | BODY MASS INDEX: 37.89 KG/M2 | SYSTOLIC BLOOD PRESSURE: 129 MMHG | HEIGHT: 68 IN | OXYGEN SATURATION: 96 % | TEMPERATURE: 97.2 F

## 2021-11-19 VITALS
OXYGEN SATURATION: 95 % | DIASTOLIC BLOOD PRESSURE: 60 MMHG | SYSTOLIC BLOOD PRESSURE: 116 MMHG | TEMPERATURE: 97.8 F | HEART RATE: 83 BPM | RESPIRATION RATE: 18 BRPM

## 2021-11-19 DIAGNOSIS — G89.29 CHRONIC ABDOMINAL PAIN: Primary | ICD-10-CM

## 2021-11-19 DIAGNOSIS — Z20.822 SUSPECTED COVID-19 VIRUS INFECTION: ICD-10-CM

## 2021-11-19 DIAGNOSIS — J02.8 SORE THROAT (VIRAL): ICD-10-CM

## 2021-11-19 DIAGNOSIS — R10.9 CHRONIC ABDOMINAL PAIN: Primary | ICD-10-CM

## 2021-11-19 DIAGNOSIS — B97.89 SORE THROAT (VIRAL): ICD-10-CM

## 2021-11-19 PROCEDURE — U0005 INFEC AGEN DETEC AMPLI PROBE: HCPCS

## 2021-11-19 PROCEDURE — 93005 ELECTROCARDIOGRAM TRACING: CPT | Performed by: GENERAL PRACTICE

## 2021-11-19 PROCEDURE — 96372 THER/PROPH/DIAG INJ SC/IM: CPT

## 2021-11-19 PROCEDURE — 99283 EMERGENCY DEPT VISIT LOW MDM: CPT

## 2021-11-19 PROCEDURE — 6360000002 HC RX W HCPCS: Performed by: GENERAL PRACTICE

## 2021-11-19 PROCEDURE — U0003 INFECTIOUS AGENT DETECTION BY NUCLEIC ACID (DNA OR RNA); SEVERE ACUTE RESPIRATORY SYNDROME CORONAVIRUS 2 (SARS-COV-2) (CORONAVIRUS DISEASE [COVID-19]), AMPLIFIED PROBE TECHNIQUE, MAKING USE OF HIGH THROUGHPUT TECHNOLOGIES AS DESCRIBED BY CMS-2020-01-R: HCPCS

## 2021-11-19 PROCEDURE — 6370000000 HC RX 637 (ALT 250 FOR IP): Performed by: GENERAL PRACTICE

## 2021-11-19 RX ORDER — ONDANSETRON 4 MG/1
4 TABLET, FILM COATED ORAL ONCE
Status: DISCONTINUED | OUTPATIENT
Start: 2021-11-19 | End: 2021-11-19 | Stop reason: HOSPADM

## 2021-11-19 RX ORDER — ASCORBIC ACID 500 MG
500 TABLET ORAL 2 TIMES DAILY
Qty: 14 TABLET | Refills: 0 | Status: SHIPPED | OUTPATIENT
Start: 2021-11-19 | End: 2022-01-07

## 2021-11-19 RX ORDER — PROMETHAZINE HYDROCHLORIDE 25 MG/ML
25 INJECTION, SOLUTION INTRAMUSCULAR; INTRAVENOUS ONCE
Status: COMPLETED | OUTPATIENT
Start: 2021-11-19 | End: 2021-11-19

## 2021-11-19 RX ORDER — ACETAMINOPHEN 500 MG
500 TABLET ORAL EVERY 4 HOURS PRN
Qty: 20 TABLET | Refills: 0 | Status: ON HOLD | OUTPATIENT
Start: 2021-11-19 | End: 2021-11-20

## 2021-11-19 RX ORDER — ZINC SULFATE 50(220)MG
50 CAPSULE ORAL DAILY
Qty: 7 CAPSULE | Refills: 0 | Status: ON HOLD | OUTPATIENT
Start: 2021-11-19 | End: 2021-11-22 | Stop reason: HOSPADM

## 2021-11-19 RX ADMIN — PROMETHAZINE HYDROCHLORIDE 25 MG: 25 INJECTION INTRAMUSCULAR; INTRAVENOUS at 15:56

## 2021-11-19 RX ADMIN — BENZOCAINE AND MENTHOL 1 LOZENGE: 15; 3.6 LOZENGE ORAL at 15:43

## 2021-11-19 ASSESSMENT — ENCOUNTER SYMPTOMS
VOMITING: 0
BACK PAIN: 0
TROUBLE SWALLOWING: 0
SHORTNESS OF BREATH: 0
CONSTIPATION: 1
SORE THROAT: 1
COUGH: 0
ABDOMINAL PAIN: 1
DIARRHEA: 0
NAUSEA: 1

## 2021-11-19 ASSESSMENT — PAIN SCALES - GENERAL
PAINLEVEL_OUTOF10: 8
PAINLEVEL_OUTOF10: 6

## 2021-11-19 ASSESSMENT — PAIN DESCRIPTION - PAIN TYPE: TYPE: ACUTE PAIN

## 2021-11-19 ASSESSMENT — PAIN DESCRIPTION - DESCRIPTORS: DESCRIPTORS: DISCOMFORT

## 2021-11-19 ASSESSMENT — PAIN DESCRIPTION - FREQUENCY: FREQUENCY: CONTINUOUS

## 2021-11-19 ASSESSMENT — PAIN DESCRIPTION - LOCATION: LOCATION: ABDOMEN

## 2021-11-19 NOTE — CARE COORDINATION
Attempted to reach pt. Pt has been seen in multiple ED depts over the last few days. Pt answered but states that he is busy right now and can't talk. Pt states that he will call back later today. Prior to call ending pt stated that he spoke with his PCP yesterday about his recent ED visits and is planning on f/u with a GI doctor.

## 2021-11-19 NOTE — ED NOTES
Pt falling asleep in triage states \"I think that promethizine pill kicked in. \"       Drew Roth RN  11/19/21 1448

## 2021-11-19 NOTE — ED PROVIDER NOTES
[facility]  ED  Emergency Department Encounter  EmergencyMedicine Resident     Pt Ella Moore  MRN: [id number]  Darcigfmohinder 1990  Date of evaluation: 11/19/21  PCP:  MASSIEL Bee CNP    CHIEF COMPLAINT       Chief Complaint   Patient presents with    Abdominal Pain    Chest Pain       HISTORY OF PRESENT ILLNESS  (Location/Symptom, Timing/Onset, Context/Setting, Quality, Duration, Modifying Factors, Severity.)      Kimberley Angelucci is a 32 y.o. male who presents with multiple medical complaints, patient has history of chronic abdominal pain, GERD. Patient complaining of typical abdominal pain, was scheduled to follow-up with GI however has not done so. Patient was seen by his primary care provider yesterday for chronic opioid abuse. Patient has been seen at numerous emergency departments for the symptoms. Patient  eloped from outside hospital earlier today, has been seen over 10 times in the last 30 days for the symptoms and had complete work-ups. Patient does have extensive psychiatric history as well. Seen yesterday and did have lab work which was unremarkable patient did test positive for cocaine. Patient denies use any opioids. Patient's main complaint is sore throat. Denies any difficulty swallowing, controlling secretions out difficulty.     PAST MEDICAL / SURGICAL / SOCIAL / FAMILY HISTORY      has a past medical history of Anxiety, Arthritis, Asthma, Bipolar I disorder, most recent episode (or current) depressed, unspecified, Clostridium difficile infection, COPD (chronic obstructive pulmonary disease) ([address] Depression, Disease of blood and blood forming organ, Eczema, Fracture, metacarpal, Gastric ulcer, Gastritis, GERD (gastroesophageal reflux disease), GI bleed, H. pylori infection, H/O blood clots, Head injury, Headache, Insomnia, Juvenile rheumatoid arthritis ([address] Neuromuscular disorder ([address] PFAPA syndrome ([address] PUD (peptic ulcer disease), Rheumatoid arthritis ([facility] Rheumatoid arthritis(714.0), Sleep apnea, 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[facility] Substance abuse ([facility] Suicidal ideation, Suicide attempt by hanging ([facility] Tobacco dependence, Ulcerative colitis ([facility] and UTI (urinary tract infection). has a past surgical history that includes Colonoscopy; bronchoscopy; other surgical history; Upper gastrointestinal endoscopy (2/4/16); pr egd transoral biopsy single/multiple (N/A, 3/20/2017); sigmoidoscopy (N/A, 3/20/2017); Cholecystectomy, laparoscopic (07/14/2017); pr esophagogastroduodenoscopy transoral diagnostic (N/A, 8/9/2017); pr colonoscopy w/biopsy single/multiple (8/9/2017); Abdomen surgery; Upper gastrointestinal endoscopy (N/A, 6/13/2018); Upper gastrointestinal endoscopy (N/A, 9/20/2018); and Endoscopy, colon, diagnostic. Social History     Socioeconomic History    Marital status: Single     Spouse name: Not on file    Number of children: Not on file    Years of education: Not on file    Highest education level: Not on file   Occupational History    Occupation: disability   Tobacco Use    Smoking status: Current Every Day Smoker     Packs/day: 0.50     Years: 15.00     Pack years: 7.50     Types: Cigarettes    Smokeless tobacco: Never Used   Vaping Use    Vaping Use: Former   Substance and Sexual Activity    Alcohol use: Not Currently     Comment: social ETOH use    Drug use: Not Currently     Types: Opiates      Comment: last used pain pill/fentanyl 2/18/21    Sexual activity: Yes     Partners: Female     Comment: Lives alone, not working.    Other Topics Concern    Not on file   Social History Narrative    ** Merged History Encounter **          Social Determinants of Health     Financial Resource Strain: Unknown    Difficulty of Paying Living Expenses: Patient refused   Food Insecurity: Unknown    Worried About Running Out of Food in the Last Year: Patient refused    [address] in the Last Year: Patient refused   Transportation Needs:     Lack of Transportation (Medical): Not on file    Lack of Transportation (Non-Medical):  Not on file   Physical Activity:     Days of Exercise per Week: Not on file    Minutes of Exercise per Session: Not on file   Stress:     Feeling of Stress : Not on file   Social Connections:     Frequency of Communication with Friends and Family: Not on file    Frequency of Social Gatherings with Friends and Family: Not on file    Attends Catholic Services: Not on file    Active Member of Clubs or Organizations: Not on file    Attends Club or Organization Meetings: Not on file    Marital Status: Not on file   Intimate Partner Violence:     Fear of Current or Ex-Partner: Not on file    Emotionally Abused: Not on file    Physically Abused: Not on file    Sexually Abused: Not on file   Housing Stability:     Unable to Pay for Housing in the Last Year: Not on file    Number of [place] in the Last Year: Not on file    Unstable Housing in the Last Year: Not on file       Family History   Problem Relation Age of Onset    Diabetes Father     Alcohol Abuse Father     Depression Father     Arthritis Father     High Blood Pressure Father     Other Father         aneurysm & epilepsy    Migraines Father     Arthritis Mother     Other Mother         aneurysm & epilepsy    Migraines Mother     Diabetes Brother         Aunt and uncles    Depression Brother     Mental Illness Brother     Other Brother         epilepsy    Migraines Brother     Stroke Other         Uncle    Other Brother         murdered Oct 6th, 2014    Colon Cancer Paternal Cousin 43    Other Sister         epilepsy   Zuniga Hedger Migraines Sister        Allergies:  Dicyclomine, Famotidine, Geodon [ziprasidone hcl], Haloperidol, Iv dye [iodides], Olanzapine, Reglan [metoclopramide], Ibuprofen, Aspirin, Dicyclomine hcl, Flagyl [metronidazole], Iodine, and Mirtazapine    Home Medications:  Prior to Admission medications    Medication Sig Start Date End Date Taking? Authorizing Provider   zinc sulfate (ZINCATE) 220 (50 Zn) MG capsule Take 1 capsule by mouth daily for 7 days 11/19/21 11/26/21 Yes Liv Yost, DO   ascorbic acid (VITAMIN C) 500 MG tablet Take 1 tablet by mouth 2 times daily for 7 days 11/19/21 11/26/21 Yes Liv Yost, DO   acetaminophen (TYLENOL) 500 MG tablet Take 1 tablet by mouth every 4 hours as needed for Pain or Fever 11/19/21 11/29/21 Yes Liv Yost, DO   benzocaine-menthol (CEPACOL) 15-4 MG LOZG lozenge Take 1 lozenge by mouth every 2 hours as needed for Sore Throat 11/19/21  Yes Liv Yost, DO   buprenorphine-naloxone (SUBOXONE) 12-3 MG sublingual film Place 1 Film under the tongue daily for 14 days. 11/18/21 12/2/21  MASSIEL Anderson CNP   tiZANidine (ZANAFLEX) 4 MG tablet Take 1 tablet by mouth every 8 hours as needed (muscle spasms) 11/18/21   MASSIEL Anderson CNP   gabapentin (NEURONTIN) 600 MG tablet Take 1 tablet by mouth 4 times daily for 30 days. 11/18/21 12/18/21  MASSIEL Anderson CNP   promethazine (PHENERGAN) 25 MG tablet Take 1 tablet by mouth 4 times daily as needed for Nausea 11/18/21 11/26/21  MASSIEL Anderson CNP   pantoprazole (PROTONIX) 40 MG tablet Take 1 tablet by mouth every morning (before breakfast) 11/18/21   MASSIEL Anderson CNP   Suvorexant (BELSOMRA) 20 MG TABS Take 1 tablet by mouth nightly for 30 days.  11/4/21 12/4/21  MASSIEL Anderson CNP   traZODone (DESYREL) 50 MG tablet Take 1 tablet by mouth nightly as needed for Sleep 11/1/21   Crystal Sullivan MD   QUEtiapine (SEROQUEL) 100 MG tablet Take 1 tablet by mouth nightly 11/1/21   Crystal Sullivan MD   aluminum & magnesium hydroxide-simethicone (MAALOX) 551-286-42 MG/5ML SUSP suspension Take 5 mLs by mouth every 6 hours as needed for Indigestion 10/30/21   Kvng Reddy DO   cloNIDine (CATAPRES) 0.1 MG tablet Take 1 tablet by mouth 3 times daily 10/7/21   MASSIEL Parikh CNP   sucralfate (CARAFATE) 1 GM tablet Take 1 tablet by mouth 4 times daily 8/23/21   Barney Barnett DO   hydrOXYzine (ATARAX) 50 MG tablet Take 50 mg by mouth 2 times daily    Historical Provider, MD   meloxicam (MOBIC) 7.5 MG tablet Take 1 tablet by mouth 2 times daily as needed for Pain 8/19/21 10/30/21  MASSIEL Parikh CNP   metFORMIN (GLUCOPHAGE) 500 MG tablet Take 1 tablet by mouth 2 times daily (with meals) 7/26/21   Amanda Markham MD   Multiple Vitamins-Minerals (THERAPEUTIC MULTIVITAMIN-MINERALS) tablet Take 1 tablet by mouth daily 7/26/21   Amanda Markham MD       REVIEW OF SYSTEMS    (2-9 systems for level 4, 10 or more for level 5)      Review of Systems   Constitutional: Negative for activity change, appetite change and chills. HENT: Positive for sore throat. Negative for congestion and trouble swallowing. Eyes: Negative for visual disturbance. Respiratory: Negative for cough and shortness of breath. Cardiovascular: Positive for chest pain. Gastrointestinal: Positive for abdominal pain, constipation and nausea. Negative for diarrhea and vomiting. Genitourinary: Negative for dysuria. Musculoskeletal: Positive for myalgias. Negative for back pain. Skin: Negative for rash. Neurological: Positive for headaches. Psychiatric/Behavioral: Negative for agitation, confusion, self-injury and suicidal ideas. PHYSICAL EXAM   (up to 7 for level 4, 8 or more for level 5)      INITIAL VITALS:   BP (!) 129/93   Pulse 107   Temp 97.2 °F (36.2 °C)   Resp 21   Ht 5' 8\" (1.727 m)   Wt 250 lb (113.4 kg)   SpO2 96%   BMI 38.01 kg/m²     Physical Exam  Vitals reviewed. Constitutional:       General: He is not in acute distress. Appearance: He is well-developed. He is not ill-appearing, toxic-appearing or diaphoretic. HENT:      Head: Normocephalic and atraumatic. Cardiovascular:      Rate and Rhythm: Tachycardia present. Pulmonary:      Effort: Pulmonary effort is normal. No respiratory distress. Breath sounds: No stridor. No wheezing, rhonchi or rales. Abdominal:      Palpations: Abdomen is soft. Tenderness: There is no guarding or rebound. Negative signs include Ponce's sign, Rovsing's sign and McBurney's sign. Genitourinary:     Testes:         Right: Swelling not present. Left: Swelling not present. Neurological:      General: No focal deficit present. Mental Status: He is alert and oriented to person, place, and time. Psychiatric:         Mood and Affect: Mood normal. Mood is not anxious or depressed. Behavior: Behavior normal.         DIFFERENTIAL  DIAGNOSIS     PLAN (LABS / IMAGING / EKG):  Orders Placed This Encounter   Procedures    COVID-19    EKG 12 Lead       MEDICATIONS ORDERED:  Orders Placed This Encounter   Medications    ondansetron (ZOFRAN) tablet 4 mg    benzocaine-menthol (CEPACOL SORE THROAT) lozenge 1 lozenge    zinc sulfate (ZINCATE) 220 (50 Zn) MG capsule     Sig: Take 1 capsule by mouth daily for 7 days     Dispense:  7 capsule     Refill:  0    ascorbic acid (VITAMIN C) 500 MG tablet     Sig: Take 1 tablet by mouth 2 times daily for 7 days     Dispense:  14 tablet     Refill:  0    acetaminophen (TYLENOL) 500 MG tablet     Sig: Take 1 tablet by mouth every 4 hours as needed for Pain or Fever     Dispense:  20 tablet     Refill:  0    benzocaine-menthol (CEPACOL) 15-4 MG LOZG lozenge     Sig: Take 1 lozenge by mouth every 2 hours as needed for Sore Throat     Dispense:  12 lozenge     Refill:  0       DDX: COVID-19, viral illness, constipation, polysubstance abuse, psychiatric disorder    DIAGNOSTIC RESULTS / EMERGENCY DEPARTMENT COURSE / MDM   :  No results found for this visit on 11/19/21.         RADIOLOGY:  None    EKG  EKG Interpretation    Interpreted by me    Rhythm: normal sinus   Rate: normal  Axis: normal  Ectopy: none  Conduction: normal  ST Segments: no acute change  T Waves: no acute change  Q Waves: none    Clinical Impression: no acute changes and normal EKG    All EKG's are interpreted by the Emergency Department Physician who either signs or Co-signs this chart in the absence of a cardiologist.    EMERGENCY DEPARTMENT COURSE/IMPRESSION: 28-year-old male present emergency department multiple medical complaints, patient has been seen numerous hospitals over the last month for similar complaints, had work-up completed yesterday with lab work which was unremarkable, patient did test positive for cocaine use, patient had CT scan in September which was unremarkable. Patient is well-appearing, patient's main complaint is sore throat and generalized fatigue as well as headache. Patient is not vaccinated against Covid, patient was given Zofran, Cepacol lozenge. No clinical [place] for lab work as patient just had lab work yesterday which was unremarkable. Symptoms are likely chronic and may be due to constipation from opioid use. Educated patient on stopping opioid abuse, focus on hydration, will plan for Covid testing. Patient has multiple medical allergies which limit what patient can receive, due to patient's opioid abuse symptoms been chronic no clinical indication for narcotics. EKG was unremarkable. Educated patient follow-up with his primary care and GI specialist, additional return precautions given. PROCEDURES:  None    CONSULTS:  None    CRITICAL CARE:  None    FINAL IMPRESSION      1. Chronic abdominal pain    2. Sore throat (viral)    3.  Suspected COVID-19 virus infection          DISPOSITION / PLAN     DISPOSITION Decision To Discharge 11/19/2021 03:41:20 PM      PATIENT REFERRED TO:  Trinity Duncan, APRN - CNP  [address] [place] [address] [address]  [phone]    In 2 days      [facility]  [address] [place] [address]  [phone]    As needed, If symptoms worsen      DISCHARGE MEDICATIONS:  New Prescriptions    ACETAMINOPHEN (TYLENOL) 500 MG TABLET    Take 1 tablet by mouth every 4 hours as needed for Pain or Fever    ASCORBIC ACID (VITAMIN C) 500 MG TABLET    Take 1 tablet by mouth 2 times daily for 7 days    BENZOCAINE-MENTHOL (CEPACOL) 15-4 MG LOZG LOZENGE    Take 1 lozenge by mouth every 2 hours as needed for Sore Throat    ZINC SULFATE (ZINCATE) 220 (50 ZN) MG CAPSULE    Take 1 capsule by mouth daily for 7 days       Kae Wilson DO  Emergency Medicine Resident    (Please note that portions of thisnote were completed with a voice recognition program.  Efforts were made to edit the dictations but occasionally words are mis-transcribed.)     Kae Wilson DO  Resident  11/19/21 0141

## 2021-11-19 NOTE — ED NOTES
Pt called to be taken to the [facility]. Pt ignores this nurse's directions and wanders outside to smoke a cigarette. Pt continues to smoke a cigarette and refuses to walk in to be seen again ignoring multiple requests by this nurse. Pt finally finishes his cigarette and follows this nurse to the [facility]. Pt then refuses to walk into the [facility] and refuses to change out. Pt states multiple times that he is not suicidal or homicidal and does not wish to be evaluated. Pt is A+O x 4, GCS = 15, and he is answering questions appropriately. Pt is free to leave and states these intentions. Pt ambulates out door with steady gait and calls a cab.      India Cordero RN  11/19/21 [id number]

## 2021-11-20 ENCOUNTER — HOSPITAL ENCOUNTER (INPATIENT)
Age: 31
LOS: 2 days | Discharge: HOME | DRG: 885 | End: 2021-11-22
Attending: EMERGENCY MEDICINE | Admitting: PSYCHIATRY & NEUROLOGY
Payer: MEDICARE

## 2021-11-20 ENCOUNTER — HOSPITAL ENCOUNTER (EMERGENCY)
Age: 31
Discharge: HOME | DRG: 885 | End: 2021-11-20
Attending: EMERGENCY MEDICINE
Payer: MEDICARE

## 2021-11-20 VITALS
BODY MASS INDEX: 37.03 KG/M2 | DIASTOLIC BLOOD PRESSURE: 68 MMHG | RESPIRATION RATE: 16 BRPM | HEART RATE: 73 BPM | TEMPERATURE: 98.1 F | OXYGEN SATURATION: 97 % | HEIGHT: 69 IN | WEIGHT: 250 LBS | SYSTOLIC BLOOD PRESSURE: 118 MMHG

## 2021-11-20 DIAGNOSIS — Z76.5 MALINGERING: Primary | ICD-10-CM

## 2021-11-20 DIAGNOSIS — F25.9 SCHIZOAFFECTIVE DISORDER, UNSPECIFIED TYPE (HCC): Primary | ICD-10-CM

## 2021-11-20 LAB
EKG ATRIAL RATE: 100 BPM
EKG P AXIS: 40 DEGREES
EKG P-R INTERVAL: 140 MS
EKG Q-T INTERVAL: 340 MS
EKG QRS DURATION: 80 MS
EKG QTC CALCULATION (BAZETT): 438 MS
EKG R AXIS: 44 DEGREES
EKG T AXIS: 38 DEGREES
EKG VENTRICULAR RATE: 100 BPM
SARS-COV-2, RAPID: NOT DETECTED
SARS-COV-2: NORMAL
SARS-COV-2: NOT DETECTED
SOURCE: NORMAL
SPECIMEN DESCRIPTION: NORMAL

## 2021-11-20 PROCEDURE — 6370000000 HC RX 637 (ALT 250 FOR IP): Performed by: PSYCHIATRY & NEUROLOGY

## 2021-11-20 PROCEDURE — 93010 ELECTROCARDIOGRAM REPORT: CPT | Performed by: INTERNAL MEDICINE

## 2021-11-20 PROCEDURE — 87635 SARS-COV-2 COVID-19 AMP PRB: CPT

## 2021-11-20 PROCEDURE — 99221 1ST HOSP IP/OBS SF/LOW 40: CPT | Performed by: PSYCHIATRY & NEUROLOGY

## 2021-11-20 PROCEDURE — 99282 EMERGENCY DEPT VISIT SF MDM: CPT

## 2021-11-20 PROCEDURE — 99283 EMERGENCY DEPT VISIT LOW MDM: CPT

## 2021-11-20 PROCEDURE — 1240000000 HC EMOTIONAL WELLNESS R&B

## 2021-11-20 RX ORDER — HYDROXYZINE 50 MG/1
50 TABLET, FILM COATED ORAL 3 TIMES DAILY PRN
Status: DISCONTINUED | OUTPATIENT
Start: 2021-11-20 | End: 2021-11-20

## 2021-11-20 RX ORDER — ACETAMINOPHEN 325 MG/1
650 TABLET ORAL EVERY 4 HOURS PRN
Status: DISCONTINUED | OUTPATIENT
Start: 2021-11-20 | End: 2021-11-22 | Stop reason: HOSPADM

## 2021-11-20 RX ORDER — MAGNESIUM HYDROXIDE/ALUMINUM HYDROXICE/SIMETHICONE 120; 1200; 1200 MG/30ML; MG/30ML; MG/30ML
30 SUSPENSION ORAL EVERY 6 HOURS PRN
Status: DISCONTINUED | OUTPATIENT
Start: 2021-11-20 | End: 2021-11-22 | Stop reason: HOSPADM

## 2021-11-20 RX ORDER — POLYETHYLENE GLYCOL 3350 17 G/17G
17 POWDER, FOR SOLUTION ORAL DAILY PRN
Status: DISCONTINUED | OUTPATIENT
Start: 2021-11-20 | End: 2021-11-22 | Stop reason: HOSPADM

## 2021-11-20 RX ORDER — TRAZODONE HYDROCHLORIDE 50 MG/1
50 TABLET ORAL NIGHTLY PRN
Status: DISCONTINUED | OUTPATIENT
Start: 2021-11-20 | End: 2021-11-22 | Stop reason: HOSPADM

## 2021-11-20 RX ADMIN — TRAZODONE HYDROCHLORIDE 50 MG: 50 TABLET ORAL at 21:51

## 2021-11-20 RX ADMIN — NICOTINE POLACRILEX 4 MG: 4 GUM, CHEWING BUCCAL at 21:51

## 2021-11-20 RX ADMIN — NICOTINE POLACRILEX 4 MG: 4 GUM, CHEWING BUCCAL at 18:56

## 2021-11-20 RX ADMIN — NICOTINE POLACRILEX 4 MG: 4 GUM, CHEWING BUCCAL at 20:05

## 2021-11-20 SDOH — ECONOMIC STABILITY: TRANSPORTATION INSECURITY
IN THE PAST 12 MONTHS, HAS THE LACK OF TRANSPORTATION KEPT YOU FROM MEDICAL APPOINTMENTS OR FROM GETTING MEDICATIONS?: NO

## 2021-11-20 SDOH — HEALTH STABILITY: PHYSICAL HEALTH: ON AVERAGE, HOW MANY MINUTES DO YOU ENGAGE IN EXERCISE AT THIS LEVEL?: 0 MIN

## 2021-11-20 SDOH — HEALTH STABILITY: PHYSICAL HEALTH: ON AVERAGE, HOW MANY DAYS PER WEEK DO YOU ENGAGE IN MODERATE TO STRENUOUS EXERCISE (LIKE A BRISK WALK)?: 0 DAYS

## 2021-11-20 SDOH — ECONOMIC STABILITY: INCOME INSECURITY: IN THE LAST 12 MONTHS, WAS THERE A TIME WHEN YOU WERE NOT ABLE TO PAY THE MORTGAGE OR RENT ON TIME?: NO

## 2021-11-20 SDOH — ECONOMIC STABILITY: TRANSPORTATION INSECURITY
IN THE PAST 12 MONTHS, HAS LACK OF TRANSPORTATION KEPT YOU FROM MEETINGS, WORK, OR FROM GETTING THINGS NEEDED FOR DAILY LIVING?: NO

## 2021-11-20 SDOH — ECONOMIC STABILITY: FOOD INSECURITY: WITHIN THE PAST 12 MONTHS, YOU WORRIED THAT YOUR FOOD WOULD RUN OUT BEFORE YOU GOT MONEY TO BUY MORE.: NEVER TRUE

## 2021-11-20 SDOH — ECONOMIC STABILITY: HOUSING INSECURITY: IN THE LAST 12 MONTHS, HOW MANY PLACES HAVE YOU LIVED?: 1

## 2021-11-20 SDOH — ECONOMIC STABILITY: FOOD INSECURITY: WITHIN THE PAST 12 MONTHS, THE FOOD YOU BOUGHT JUST DIDN'T LAST AND YOU DIDN'T HAVE MONEY TO GET MORE.: NEVER TRUE

## 2021-11-20 SDOH — ECONOMIC STABILITY: HOUSING INSECURITY
IN THE LAST 12 MONTHS, WAS THERE A TIME WHEN YOU DID NOT HAVE A STEADY PLACE TO SLEEP OR SLEPT IN A SHELTER (INCLUDING NOW)?: NO

## 2021-11-20 ASSESSMENT — PATIENT HEALTH QUESTIONNAIRE - PHQ9
5. POOR APPETITE OR OVEREATING: MORE THAN HALF THE DAYS
2. FEELING DOWN, DEPRESSED OR HOPELESS: MORE THAN HALF THE DAYS
SUM OF ALL RESPONSES TO PHQ9 QUESTIONS 1 & 2: 4
SUM OF ALL RESPONSES TO PHQ QUESTIONS 1-9: 18
1. LITTLE INTEREST OR PLEASURE IN DOING THINGS: MORE THAN HALF THE DAYS
DEPRESSION UNABLE TO ASSESS: YES
8. MOVING OR SPEAKING SO SLOWLY THAT OTHER PEOPLE COULD HAVE NOTICED. OR THE OPPOSITE, BEING SO FIGETY OR RESTLESS THAT YOU HAVE BEEN MOVING AROUND A LOT MORE THAN USUAL: MORE THAN HALF THE DAYS
6. FEELING BAD ABOUT YOURSELF - OR THAT YOU ARE A FAILURE OR HAVE LET YOURSELF OR YOUR FAMILY DOWN: MORE THAN HALF THE DAYS
4. FEELING TIRED OR HAVING LITTLE ENERGY: MORE THAN HALF THE DAYS
SUM OF ALL RESPONSES TO PHQ QUESTIONS 1-9: 18
7. TROUBLE CONCENTRATING ON THINGS, SUCH AS READING THE NEWSPAPER OR WATCHING TELEVISION: MORE THAN HALF THE DAYS
3. TROUBLE FALLING OR STAYING ASLEEP: MORE THAN HALF THE DAYS
9. THOUGHTS THAT YOU WOULD BE BETTER OFF DEAD, OR OF HURTING YOURSELF: MORE THAN HALF THE DAYS

## 2021-11-20 ASSESSMENT — ENCOUNTER SYMPTOMS
COUGH: 1
DIARRHEA: 0
BACK PAIN: 0
SHORTNESS OF BREATH: 0
ABDOMINAL PAIN: 0
CONSTIPATION: 0
SORE THROAT: 0
CHEST TIGHTNESS: 0
COUGH: 0
DIARRHEA: 0
WHEEZING: 0
TROUBLE SWALLOWING: 0
SINUS PRESSURE: 0
NAUSEA: 0
COLOR CHANGE: 0
CONSTIPATION: 0
VOMITING: 0
BLOOD IN STOOL: 0
TROUBLE SWALLOWING: 0
FACIAL SWELLING: 0
SORE THROAT: 1
VOMITING: 0
BACK PAIN: 0
EYE DISCHARGE: 0
EYE REDNESS: 0
ABDOMINAL PAIN: 0
EYE PAIN: 0
RHINORRHEA: 0
COLOR CHANGE: 0
BLOOD IN STOOL: 0
SHORTNESS OF BREATH: 0
NAUSEA: 0

## 2021-11-20 ASSESSMENT — LIFESTYLE VARIABLES
HOW OFTEN DO YOU HAVE A DRINK CONTAINING ALCOHOL: 2-3 TIMES A WEEK
HISTORY_ALCOHOL_USE: YES
HOW MANY STANDARD DRINKS CONTAINING ALCOHOL DO YOU HAVE ON A TYPICAL DAY: 5 OR 6

## 2021-11-20 ASSESSMENT — SOCIAL DETERMINANTS OF HEALTH (SDOH)
HOW OFTEN DO YOU GET TOGETHER WITH FRIENDS OR RELATIVES?: NEVER
WITHIN THE LAST YEAR, HAVE YOU BEEN AFRAID OF YOUR PARTNER OR EX-PARTNER?: NO
WITHIN THE LAST YEAR, HAVE YOU BEEN KICKED, HIT, SLAPPED, OR OTHERWISE PHYSICALLY HURT BY YOUR PARTNER OR EX-PARTNER?: NO
WITHIN THE LAST YEAR, HAVE YOU BEEN HUMILIATED OR EMOTIONALLY ABUSED IN OTHER WAYS BY YOUR PARTNER OR EX-PARTNER?: NO
DO YOU BELONG TO ANY CLUBS OR ORGANIZATIONS SUCH AS CHURCH GROUPS UNIONS, FRATERNAL OR ATHLETIC GROUPS, OR SCHOOL GROUPS?: NO
HOW OFTEN DO YOU ATTENT MEETINGS OF THE CLUB OR ORGANIZATION YOU BELONG TO?: NEVER
ARE YOU MARRIED, WIDOWED, DIVORCED, SEPARATED, NEVER MARRIED, OR LIVING WITH A PARTNER?: NEVER MARRIED
IN A TYPICAL WEEK, HOW MANY TIMES DO YOU TALK ON THE PHONE WITH FAMILY, FRIENDS, OR NEIGHBORS?: NEVER
HOW OFTEN DO YOU ATTEND CHURCH OR RELIGIOUS SERVICES?: NEVER
HOW HARD IS IT FOR YOU TO PAY FOR THE VERY BASICS LIKE FOOD, HOUSING, MEDICAL CARE, AND HEATING?: SOMEWHAT HARD
WITHIN THE LAST YEAR, HAVE TO BEEN RAPED OR FORCED TO HAVE ANY KIND OF SEXUAL ACTIVITY BY YOUR PARTNER OR EX-PARTNER?: NO

## 2021-11-20 ASSESSMENT — SLEEP AND FATIGUE QUESTIONNAIRES
DIFFICULTY FALLING ASLEEP: YES
SLEEP PATTERN: DIFFICULTY FALLING ASLEEP;DIFFICULTY ARISING
DO YOU HAVE DIFFICULTY SLEEPING: YES
DIFFICULTY STAYING ASLEEP: YES
RESTFUL SLEEP: NO
DO YOU USE A SLEEP AID: YES
DIFFICULTY ARISING: YES

## 2021-11-20 ASSESSMENT — PAIN SCALES - GENERAL: PAINLEVEL_OUTOF10: 0

## 2021-11-20 NOTE — BH NOTE
Patient given tobacco quitline number 44191814735 at this time, refusing to call at this time, states \" I just dont want to quit now\"- patient given information as to the dangers of long term tobacco use. Continue to reinforce the importance of tobacco cessation.

## 2021-11-20 NOTE — CONSULTS
Department of Psychiatry  Behavioral Health Consult    REASON FOR CONSULT: Suicidal ideation    CONSULTING PHYSICIAN: Dr. Shazia Cervantes    History obtained from: Patient and chart    HISTORY OF PRESENT ILLNESS:    The patient is a 32 y.o. male with significant past psychiatric history of depression, schizoaffective disorder and malingering who presents with anxiety, depression and suicidal ideation. The patient also reports vague homicidal thoughts. The patient is known to me from his multiple previous admissions. I noted that the patient has presented to ED 5 times in the last 24 hours. He was last admitted to psychiatry at the end of October and discharged on November 2, 2021. At that time the patient showed drug-seeking behavior. I noted that the patient's urine tox was positive for cocaine on 11/18/2021. The patient was seen face-to-face. He is very anxious. He is reporting severe depressive symptoms with suicidal thoughts. He is denying any auditory or visual hallucination. He feels unable to cope. He states he has been prescribed Suboxone. He denies taking cocaine over the last couple days but admits that he may have taken it before that time. The patient feels unable to contract for safety. He states he has no safe place to go to. The place where he has been living with his full of drugs. The patient reports hopelessness, helplessness, lack of motivation and energy. He has difficulty with attention and concentration because of his depression and anxiety. Discussed that patient has not followed up with outpatient care following discharge. He has continued to use drugs. We also discussed that he has a history of seeking medications which are controlled while on the unit. We agreed that the patient can be admitted. I would not be prescribing him any controlled substances or sending him out with a prescription of controlled substances.     The patient is currently receiving care for the above psychiatric illness. Psychiatric Review of Systems        ·    Obsessions and Compulsions: Denies    ·    Elizabeth or Hypomania: Denies  ·    Hallucinations: Denies  ·    Panic Attacks:  Denies  ·    Delusions:  Denies  ·    Phobias:  Denies  ·    Trauma: Denies      Substance Abuse History:  Extensive history of recreational substance use including cocaine, alcohol, benzodiazepines and opioids. He has also shown drug-seeking behavior for Phenergan    Past Psychiatric History:  The patient has been given diagnoses of schizoaffective disorder and substance use disorder. The patient has 2 prior suicide attempts. He has been admitted to psychiatry several times.       Past Medical History:        Diagnosis Date    Anxiety     Arthritis     Asthma     Bipolar I disorder, most recent episode (or current) depressed, unspecified 9/12/2014    Clostridium difficile infection     COPD (chronic obstructive pulmonary disease) (HCC)     Depression     Disease of blood and blood forming organ     Eczema     Fracture, metacarpal     R 4th and 5th    Gastric ulcer     Gastritis 06/13/2018    GERD (gastroesophageal reflux disease)     GI bleed     H. 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[address]     Substance abuse ([address]     Hx of opiates, benzos, cocaine, alcohol abuse    Suicidal ideation     Suicide attempt by hanging ([address]     Tobacco dependence     Ulcerative colitis ([address]     UTI (urinary tract infection)        Past Surgical History:        Procedure Laterality Date    ABDOMEN SURGERY      upper GI scope 7/7/2015    BRONCHOSCOPY      CHOLECYSTECTOMY, LAPAROSCOPIC  07/14/2017    surgery performed at [address] [place] [address] [facility] ENDOSCOPY, COLON, DIAGNOSTIC      OTHER SURGICAL HISTORY      lumbar puncture    [place] COL[place]COPY W/BIOPSY SINGLE/MULTIPLE  8/9/2017    COLONOSCOPY WITH BIOPSY performed by Frannie Kaplan MD at [facility]    [place] EGD TRANSORAL BIOPSY SINGLE/MULTIPLE N/A 3/20/2017    EGD BIOPSY performed by Corazon Soni MD at [facility]    [place] ESOPHAGOGASTRODUODENOSCOPY TRANSORAL DIAGNOSTIC N/A 8/9/2017    EGD ESOPHAGOGASTRODUODENOSCOPY performed by Frannie Kaplan MD at [address] N/A 3/20/2017    SIGMOIDOSCOPY DIAGNOSTIC FLEXIBLE performed by Corazon Soni MD at [address]  2/4/16    UPPER GASTROINTESTINAL ENDOSCOPY N/A 6/13/2018    GASTRITIS    UPPER GASTROINTESTINAL ENDOSCOPY N/A 9/20/2018    EGD BIOPSY performed by Jg Alvarez MD at [address] [facility]         Medications Prior to Admission:   Not in a hospital admission.     Allergies:  Dicyclomine, Famotidine, Geodon [ziprasidone hcl], Haloperidol, Iv dye [iodides], Olanzapine, Reglan [metoclopramide], Ibuprofen, Aspirin, Dicyclomine hcl, Flagyl [metronidazole], Iodine, and Mirtazapine    FAMILY/SOCIAL HISTORY:  Family History   Problem Relation Age of Onset    Diabetes Father     Alcohol Abuse Father     Depression Father     Arthritis Father     High Blood Pressure Father     Other Father         aneurysm & epilepsy    Migraines Father     Arthritis Mother     Other Mother         aneurysm & epilepsy    Migraines Mother     Diabetes Brother         Aunt and uncles    Depression Brother     Mental Illness Brother     Other Brother         epilepsy    Migraines Brother     Stroke Other         Uncle    Other Brother         murdered Oct 6th, 2014    Colon Cancer Paternal Cousin 43    Other Sister         epilepsy    Migraines Sister      Social History     Socioeconomic History    Marital status: Single     Spouse name: Not on file    Number of children: Not on file    Years of education: Not on file    Highest education level: Not on file   Occupational History    Occupation: disability   Tobacco Use    Smoking status: Current Every Day Smoker     Packs/day: 0.50     Years: 15.00     Pack years: 7.50     Types: Cigarettes    Smokeless tobacco: Never Used   Vaping Use    Vaping Use: Former   Substance and Sexual Activity    Alcohol use: Not Currently     Comment: Hx of  alcohol abuse    Drug use: Yes     Types: Opiates , Cocaine     Comment: Hx of opiates, benzos, cocaine, alcohol abuse    Sexual activity: Yes     Partners: Female     Comment: Lives alone, not working. Other Topics Concern    Not on file   Social History Narrative    ** Merged History Encounter **          Social Determinants of Health     Financial Resource Strain: Unknown    Difficulty of Paying Living Expenses: Patient refused   Food Insecurity: Unknown    Worried About Running Out of Food in the Last Year: Patient refused    [address] in the Last Year: Patient refused   Transportation Needs:     Lack of Transportation (Medical): Not on file    Lack of Transportation (Non-Medical):  Not on file   Physical Activity:     Days of Exercise per Week: Not on file    Minutes of Exercise per Session: Not on file   Stress:     Feeling of Stress : Not on file   Social Connections:     Frequency of Communication with Friends and Family: Not on file    Frequency of Social Gatherings with Friends and Family: Not on file    Attends [employer] Services: Not on file    Active Member of Clubs or Organizations: Not on file    Attends Club or Organization Meetings: Not on file    Marital Status: Not on file   Intimate Partner Violence:     Fear of Current or Ex-Partner: Not on file    Emotionally Abused: Not on file    Physically Abused: Not on file    Sexually Abused: Not on file   Housing Stability:     Unable to Pay for Housing in the Last Year: Not on file    Number of Places Lived in the Last Year: Not on file    Unstable Housing in the Last Year: Not on file       REVIEW OF SYSTEMS    Constitutional: [] fever  [] chills  [] weight loss  []weakness [] Other:  Eyes:  [] photophobia  [] discharge [] acuity change   [] Diplopia   [] Other:  HENT:  [] sore throat  [] ear pain [] Tinnitus   [] Other  Respiratory:  [] Cough  [] Shortness of breath   [] Sputum   [] Other:   Cardiac: []Chest pain   []Palpitations []Edema  []PND  [] Other:  GI:  []Abdominal pain   []Nausea  []Vomiting  []Diarrhea  [] Other:  :  [] Dysuria   []Frequency  []Hematuria  []Discharge  [] Other:  Possible Pregnancy: []Yes   []No   LMP:   Musculoskeletal:  []Back pain  []Neck pain  []Recent Injury   Skin:  []Rash  [] Itching  [] Other:  Neurologic:  [] Headache  [] Focal weakness  [] Sensory changes []Other:  Endocrine:  [] Polyuria  [] Polydipsia  [] Hair Loss  [] Other:  Lymphatic:   [] Swollen glands   Psychiatric:  As per HPI      All other systems negative except as marked or mentioned/indicated in the HPI. [facility] PHYSICAL EXAM:  Vitals:  /88   Pulse 75   Temp 98.1 °F (36.7 °C) (Oral)   Resp 16   Wt 250 lb (113.4 kg)   SpO2 95%   BMI 36.92 kg/m²      Neuro Exam:       Involuntary Movements: No    Mental Status Examination:    Level of consciousness:  lethargic   Appearance: Disheveled with poor grooming and poor personal hygiene. Behavior/Motor: No psychomotor abnormalities  Attitude toward examiner:  cooperative  Speech:  slow   Mood: anxious  Affect:  mood congruent  Thought processes:  linear, goal directed and coherent   Thought content:  Suicidal Ideation:  Active  Reports passive homicidal thoughts but refused to elaborate. Denies any specific victims.   Delusions:  no evidence of delusions  Perceptual Disturbance:  denies any perceptual disturbance  Cognition:  oriented to person, place, and time   Concentration intact  Memory intact  Insight poor   Judgement fair   Fund of Knowledge adequate        LABS: REVIEWED TODAY:  Recent Labs     11/18/21 0209   WBC 6.2   HGB 12.8*        Recent Labs     11/18/21 0209      K 4.0      CO2 27   BUN 20   CREATININE 0.89   GLUCOSE 117*     Recent Labs     11/18/21 0209   BILITOT 0.20*   ALKPHOS 60   AST 24   ALT 10     Lab Results   Component Value Date    LABAMPH NEG 11/18/2021    BARBSCNU NEGATIVE 10/30/2021    LABBENZ NEG 11/18/2021    LABBENZ NEGATIVE 06/12/2013    LABMETH NEG 11/18/2021    OPIATESCREENURINE NEG 11/18/2021    PHENCYCLIDINESCREENURINE NEG 11/18/2021    PPXUR NOT REPORTED 10/30/2021     Lab Results   Component Value Date    TSH 0.59 03/28/2018     No results found for: LITHIUM  No results found for: VALPROATE, CBMZ  No results found for: LITHIUM, VALPROATE    FURTHER LABS ORDERED :      Radiology   XR CHEST PORTABLE    Result Date: 10/30/2021  EXAMINATION: ONE XRAY VIEW OF THE CHEST 10/30/2021 1:34 am COMPARISON: 09/19/2020 HISTORY: ORDERING SYSTEM PROVIDED HISTORY: Epigastric pain TECHNOLOGIST PROVIDED HISTORY: Epigastric pain Reason for Exam: epigastric pain Acuity: Acute Type of Exam: Initial FINDINGS: Heart size and configuration are normal.  Hilar and mediastinal structures are unremarkable. The lungs are clear. No pneumothorax or pleural fluid. No acute bone finding. No acute cardiopulmonary disease. DIAGNOSIS:    MDD recurrent severe    MORGAN    RISK ASSESSMENT: Moderate risk of suicide attempts and self-injurious behavior      RECOMMENDATIONS-admit to psychiatry    Risk Management:  routine:  no special precautions necessary    Medications: Will reorder prior to admission medications other than controlled substances  Discussed with the treating physician/ team about the patient and treatment plan  Reviewed the chart    Discussed with the patient risk, benefit, alternative and common side effects for the  proposed medication treatment. Patient is consenting to the treatment.     Thanks for the consult. Please call me if needed. Electronically signed by Kayce Amador MD on 11/20/2021 at 11:37 AM    Please note that this chart was generated using voice recognition Dragon dictation software. Although every effort was made to ensure the accuracy of this automated transcription, some errors in transcription may have occurred.

## 2021-11-20 NOTE — BH NOTE
Writer attempted to verify medications with [facility] on [address] [facility] but no one in the pharmacy answered the call and writer was on hold for over 30 minutes.

## 2021-11-20 NOTE — BH NOTE
RT unable to complete assessment d/t pt sleeping soundly on approach. RT will seek out pt to complete assessment during next shift on 11/21/2021.
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
## 2021-11-20 NOTE — BH NOTE
`[facility]  Admission Note     Admission Type:   Admission Type: Voluntary    Reason for admission:  Reason for Admission: suicidal ideation    PATIENT STRENGTHS:  Strengths: Communication, Social Skills    Patient Strengths and Limitations:  Limitations: Lacks leisure interests, Difficulty problem solving/relies on others to help solve problems    Addictive Behavior:   Addictive Behavior  In the past 3 months, have you felt or has someone told you that you have a problem with:  : None  Do you have a history of Chemical Use?: No  Do you have a history of Alcohol Use?: Yes  Do you have a history of Street Drug Abuse?: Yes  Histroy of Prescripton Drug Abuse?: No    Medical Problems:   Past Medical History:   Diagnosis Date    Anxiety     Arthritis     Asthma     Bipolar I disorder, most recent episode (or current) depressed, unspecified 9/12/2014    Clostridium difficile infection     COPD (chronic obstructive pulmonary disease) [facility]     Depression     Disease of blood and blood forming organ     Eczema     Fracture, metacarpal     R 4th and 5th    Gastric ulcer     Gastritis 06/13/2018    GERD (gastroesophageal reflux disease)     GI bleed     H. 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[address]     Substance abuse ([address]     Hx of opiates, benzos, cocaine, alcohol abuse    Suicidal ideation     Suicide attempt by hanging ([address]     Tobacco dependence     Ulcerative colitis ([address]     UTI (urinary tract infection)        Status EXAM:  Status and Exam  Normal: No  Facial Expression: Flat  Affect: Appropriate  Level of Consciousness: Alert  Mood: Depressed, Anxious  Motor Activity:Normal: No  Motor Activity: Decreased  Interview Behavior: Cooperative  Preception: [place] to Person, [place] to Time, [place] to Place, [place] to Situation  Attention:Normal: No  Attention: Distractible  Thought Processes: Circumstantial  Thought Content:Normal: No  Thought Content: Preoccupations  Hallucinations: None  Delusions: No  Memory:Normal: Yes  Memory: Confabulation  Insight and Judgment: No  Insight and Judgment: Poor Judgment, Poor Insight, Unmotivated, Unrealistic  Present Suicidal Ideation: No  Present Homicidal Ideation: No    Tobacco Screening:  Practical Counseling, on admission, reuben X, if applicable and completed (first 3 are required if patient doesn't refuse):            ( )  Recognizing danger situations (included triggers and roadblocks)                    ( )  Coping skills (new ways to manage stress, exercise, relaxation techniques, changing routine, distraction)                                                           ( )  Basic information about quitting (benefits of quitting, techniques in how to quit, available resources  ( ) Referral for counseling faxed to [facility]                                           ( ) Patient refused counseling  ( ) Patient has not smoked in the last 30 days    Metabolic Screening:    Lab Results   Component Value Date    LABA1C 5.7 10/23/2014       Lab Results   Component Value Date    CHOL 205 (H) 02/21/2017    CHOL 158 11/09/2015    CHOL 152 10/23/2014    CHOL 206 (H) 03/05/2014    CHOL 205 (H) 01/22/2014    CHOL 208 (H) 08/28/2013     Lab Results   Component Value Date    TRIG 189 (H) 02/21/2017    TRIG 223 (H) 11/09/2015    TRIG 52 10/23/2014    TRIG 104 03/05/2014    TRIG 74 01/22/2014    TRIG 104 08/28/2013     Lab Results   Component Value Date    HDL 44 02/21/2017    HDL 40 (L) 11/09/2015    HDL 55 10/23/2014    HDL 54 03/05/2014    HDL 53 01/22/2014    HDL 72 (H) 08/28/2013     No components found for: LDLCAL  No results found for: LABVLDL      Body mass index is 33.91 kg/m². BP Readings from Last 2 Encounters:   11/20/21 128/71   11/20/21 118/68           Pt admitted with followings belongings:  Dentures: None  Vision - Corrective Lenses: None  Hearing Aid: None  Jewelry: Necklace (silver chain)  Body Piercings Removed: N/A  Clothing: Socks, Footwear, Undergarments (Comment), Jacket / coat, Pants, Shirt  Were All Patient Medications Collected?: Yes     Valuables sent home with n/a. Valuables placed in safe in security envelope, number:  [id number]. Patient's home medications were collected and locked in safe in [place]. Patient oriented to surroundings and program expectations and copy of patient rights given. Received admission packet:  yes. Consents reviewed, signed yes. Patient verbalize understanding:  yes. Patient education on precautions: yes    Pt admitted to [facility] unit room 226-1. On admit, pt denies any suicidal or homicidal ideation.                     Nathaniel Aguero RN

## 2021-11-20 NOTE — CARE COORDINATION
DEZ unable to complete biopsychosocial assessment on this date; will complete with patient tomorrow.

## 2021-11-20 NOTE — ED TRIAGE NOTES
Mode of arrival (squad #, walk in, police, etc) :cab      Mental teresa  problems      Arrival Note (brief scenario, treatment PTA, etc). : feels like to be back on meds was seen here earlier today        C= \"Have you ever felt that you should Cut down on your drinking? \"  No  A= \"Have people Annoyed you by criticizing your drinking? \"  No  G= \"Have you ever felt bad or Guilty about your drinking? \"  No  E= \"Have you ever had a drink as an Eye-opener first thing in the morning to steady your nerves or to help a hangover? \"  No      Deferred []      Reason for deferring: N/A    *If yes to two or more: probable alcohol abuse. *

## 2021-11-20 NOTE — ED PROVIDER NOTES
[address]  EMERGENCY DEPARTMENT ENCOUNTER      Pt Name: Alma Roa  MRN: [id number]  [place] 1990  Date of evaluation: 11/20/21      CHIEF COMPLAINT       Chief Complaint   Patient presents with    Nausea    Mental Health Problem         HISTORY OF PRESENT ILLNESS    Alma Roa is a 32 y.o. male who presents complaining of Psychiatric Evaluation. Patient is here for his fifth ER visit in 24 hours complaining of just wanting to talk to someone about his mental health. Patient has been seen and evaluated by multiple social workers in this time. Patient states he does occasionally have thoughts but no actual plan of suicide or homicide. Patient admits to drug use. Patient has chronic abdominal pain for which she was seen for twice yesterday and work-up was negative. Patient states he has a little bit of nausea but he is eating sunflower seeds currently. No fevers no diarrhea. Patient is not taking his meds like he supposed to. REVIEW OF SYSTEMS       Review of Systems   Constitutional: Negative for activity change, appetite change, chills, diaphoresis and fever. HENT: Negative for congestion, ear pain, facial swelling, nosebleeds, rhinorrhea, sinus pressure, sore throat and trouble swallowing. Eyes: Negative for pain, discharge and redness. Respiratory: Negative for cough, chest tightness, shortness of breath and wheezing. Cardiovascular: Negative for chest pain, palpitations and leg swelling. Gastrointestinal: Negative for abdominal pain, blood in stool, constipation, diarrhea, nausea and vomiting. Genitourinary: Negative for difficulty urinating, dysuria, flank pain, frequency, genital sores and hematuria. Musculoskeletal: Negative for arthralgias, back pain, gait problem, joint swelling, myalgias and neck pain. Skin: Negative for color change, pallor, rash and wound.    Neurological: Negative for dizziness, tremors, seizures, syncope, speech difficulty, weakness, numbness and headaches. Psychiatric/Behavioral: Positive for dysphoric mood. Negative for confusion, decreased concentration, hallucinations, self-injury, sleep disturbance and suicidal ideas.        PAST MEDICAL HISTORY     Past Medical History:   Diagnosis Date    Anxiety     Arthritis     Asthma     Bipolar I disorder, most recent episode (or current) depressed, unspecified 9/12/2014    Clostridium difficile infection     COPD (chronic obstructive pulmonary disease) ([address]     Depression     Disease of blood and blood forming organ     Eczema     Fracture, metacarpal     R 4th and 5th    Gastric ulcer     Gastritis 06/13/2018    GERD (gastroesophageal reflux disease)     GI bleed     H. 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[address]     Substance abuse ([facility]     Hx of opiates, benzos, cocaine, alcohol abuse    Suicidal ideation     Suicide attempt by hanging ([address]     Tobacco dependence     Ulcerative colitis ([address]     UTI (urinary tract infection)        SURGICAL HISTORY       Past Surgical History:   Procedure Laterality Date    ABDOMEN SURGERY      upper GI scope 7/7/2015    BRONCHOSCOPY      CHOLECYSTECTOMY, LAPAROSCOPIC  07/14/2017    surgery performed at [address] [place] [address], COLON, DIAGNOSTIC      OTHER SURGICAL HISTORY      lumbar puncture    [place] COLONOSCOPY W/BIOPSY SINGLE/MULTIPLE  8/9/2017    COLONOSCOPY WITH BIOPSY performed by Melo Manuel MD at [facility]    [place] EGD TRANSORAL BIOPSY SINGLE/MULTIPLE N/A 3/20/2017    EGD BIOPSY performed by Bethel Villatoro MD at [facility]    [place] ESOPHAGOGASTRODUODENOSCOPY TRANSORAL DIAGNOSTIC N/A 8/9/2017    EGD ESOPHAGOGASTRODUODENOSCOPY performed by Efrain Luis MD at [facility] N/A 3/20/2017    SIGMOIDOSCOPY DIAGNOSTIC FLEXIBLE performed by Valente Melendez MD at [facility] [address]   2/4/16    UPPER GASTROINTESTINAL ENDOSCOPY N/A 6/13/2018    GASTRITIS    UPPER GASTROINTESTINAL ENDOSCOPY N/A 9/20/2018    EGD BIOPSY performed by Lon Whitney MD at [facility]       Previous Medications    ACETAMINOPHEN (TYLENOL) 500 MG TABLET    Take 1 tablet by mouth every 4 hours as needed for Pain or Fever    ALUMINUM & MAGNESIUM HYDROXIDE-SIMETHICONE (MAALOX) 200-200-20 MG/5ML SUSP SUSPENSION    Take 5 mLs by mouth every 6 hours as needed for Indigestion    ASCORBIC ACID (VITAMIN C) 500 MG TABLET    Take 1 tablet by mouth 2 times daily for 7 days    BENZOCAINE-MENTHOL (CEPACOL) 15-4 MG LOZG LOZENGE    Take 1 lozenge by mouth every 2 hours as needed for Sore Throat    BUPRENORPHINE-NALOXONE (SUBOXONE) 12-3 MG SUBLINGUAL FILM    Place 1 Film under the tongue daily for 14 days. CLONIDINE (CATAPRES) 0.1 MG TABLET    Take 1 tablet by mouth 3 times daily    GABAPENTIN (NEURONTIN) 600 MG TABLET    Take 1 tablet by mouth 4 times daily for 30 days. HYDROXYZINE (ATARAX) 50 MG TABLET    Take 50 mg by mouth 2 times daily    METFORMIN (GLUCOPHAGE) 500 MG TABLET    Take 1 tablet by mouth 2 times daily (with meals)    MULTIPLE VITAMINS-MINERALS (THERAPEUTIC MULTIVITAMIN-MINERALS) TABLET    Take 1 tablet by mouth daily    PANTOPRAZOLE (PROTONIX) 40 MG TABLET    Take 1 tablet by mouth every morning (before breakfast)    PROMETHAZINE (PHENERGAN) 25 MG TABLET    Take 1 tablet by mouth 4 times daily as needed for Nausea    QUETIAPINE (SEROQUEL) 100 MG TABLET    Take 1 tablet by mouth nightly    SUCRALFATE (CARAFATE) 1 GM TABLET    Take 1 tablet by mouth 4 times daily    SUVOREXANT (BELSOMRA) 20 MG TABS    Take 1 tablet by mouth nightly for 30 days. TIZANIDINE (ZANAFLEX) 4 MG TABLET    Take 1 tablet by mouth every 8 hours as needed (muscle spasms)    TRAZODONE (DESYREL) 50 MG TABLET    Take 1 tablet by mouth nightly as needed for Sleep    ZINC SULFATE (ZINCATE) 220 (50 ZN) MG CAPSULE    Take 1 capsule by mouth daily for 7 days       ALLERGIES     is allergic to dicyclomine, famotidine, geodon [ziprasidone hcl], haloperidol, iv dye [iodides], olanzapine, reglan [metoclopramide], ibuprofen, aspirin, dicyclomine hcl, flagyl [metronidazole], iodine, and mirtazapine. SOCIAL HISTORY      reports that he has been smoking cigarettes. He has a 7.50 pack-year smoking history. He has never used smokeless tobacco. He reports previous alcohol use. He reports current drug use. Drugs: Opiates  and Cocaine. PHYSICAL EXAM     INITIAL VITALS: /88   Pulse 75   Temp 98.1 °F (36.7 °C) (Oral)   Resp 16   Wt 250 lb (113.4 kg)   SpO2 95%   BMI 36.92 kg/m²      Physical Exam  Constitutional:       General: He is not in acute distress. Appearance: He is well-developed. He is not diaphoretic. HENT:      Head: Normocephalic and atraumatic. Eyes:      General: No scleral icterus. Right eye: No discharge. Left eye: No discharge. Conjunctiva/sclera: Conjunctivae normal.      Pupils: Pupils are equal, round, and reactive to light. Cardiovascular:      Rate and Rhythm: Normal rate and regular rhythm. Heart sounds: Normal heart sounds. No murmur heard. No friction rub. No gallop. Pulmonary:      Effort: Pulmonary effort is normal. No respiratory distress. Breath sounds: Normal breath sounds. No wheezing or rales. Neurological:      Mental Status: He is alert and oriented to person, place, and time. Psychiatric:         Mood and Affect: Mood is depressed. Speech: Speech normal.         Behavior: Behavior is withdrawn. Thought Content: Thought content includes suicidal ideation.  Thought content does not include homicidal or suicidal plan. DIAGNOSTIC RESULTS     LABS: All lab results were reviewed by myself, and all abnormals are listed below. Labs Reviewed   COVID-19, RAPID         MEDICAL DECISION MAKING:     Patient is already been seen multiple times in the last 24 hours and I do not believe he meets any criteria for hospitalization but will have the  talk to him. EMERGENCY DEPARTMENT COURSE:   Vitals:    Vitals:    11/20/21 0847   BP: 124/88   Pulse: 75   Resp: 16   Temp: 98.1 °F (36.7 °C)   TempSrc: Oral   SpO2: 95%   Weight: 250 lb (113.4 kg)       The patient was given the following medications while in the emergency department:  No orders of the defined types were placed in this encounter. -------------------------  11:44 AM EST  Patient was evaluated by the psychiatrist and he is going to go ahead and admit the patient to the [facility]. Patient is medically cleared. FINAL IMPRESSION      1. Schizoaffective disorder, unspecified type [facility])          DISPOSITION/PLAN   DISPOSITION Decision To Admit 11/20/2021 11:43:46 AM      PATIENT REFERREDTO:  No follow-up provider specified.     DISCHARGEMEDICATIONS:  New Prescriptions    No medications on file       (Please note that portions of this note were completed with a voice recognition program.  Efforts were made to edit thedictations but occasionally words are mis-transcribed.)    Stephanie Verduzco MD  Attending Emergency Physician                     Stephanie Verduzco MD  11/20/21 5445

## 2021-11-20 NOTE — ED NOTES
Dr Susie Peoples completed eval with patient he will admit, explained to patient no controlled substances nor suboxone will be provided on unit, any issues and he will be immediately discharged. Patient understanding of this continues to report sore throat and \"I don't feel good I need to get back on my meds\". Patient provided lunch tray and sandwiches, rapid to be ordered and completed before admission.
Eval by Dr Cassi Nguyễn. Pt is calm et cooperative.      Uli Wilkes RN  11/20/21 3355
Hand off to [facility] staff - Dewayne Acevedo
[facility] access called and Dr Ocampo See to admit and voluntary and bed request to [facility].
Patient is sleeping in LOS in dayroom in lounge chair and safe guard is present.
Pt returns to this ER for his 5th ER visit in the last 24 hours, and a total of 11 ER visits in the last 9 days requesting evaluation of ongoing nausea and wanting help with his mental illness. Pt still has his prescriptions      Pt arrives A+O x 4, GCS = 15, PMS x 4 intact, eupneic, and PWD. Pulse is regular et strong.      Warden Joseph RN  11/20/21 3200
was notified via perfect serve due to patient's hx of malingering bxs to see about disposition)

## 2021-11-20 NOTE — ED PROVIDER NOTES
[address]  EMERGENCY DEPARTMENT ENCOUNTER    Pt Name: Jaiden Verduzco  MRN: [id number]  YOB: 1990  Date of evaluation:11/20/21  PCP: MASSIEL Yoder CNP    CHIEF COMPLAINT       Chief Complaint   Patient presents with   [address] Problem       HISTORY OF PRESENT ILLNESS    Jaiden Verduzco is a 32 y.o. male who presents with a chief complaint of wanting to get back on his psychiatric medications. Patient states that he recently lost his back but they were in and he has been off of them. He states that he does not feel right. He denies any active thoughts of suicide or homicide. He states that he is not looking to hurt himself and states he is not wanting to hurt anybody else either. No visual or auditory hallucinations. He also states he is been sick with a sore throat. He was seen in the hospital another hospital yesterday and diagnosed with a viral pharyngitis. He reports a cough as well. He does acknowledge smoking cigarettes. He reports a fever at home as well and states it was 102. Symptoms are acute. Symptoms are moderate per nothing make symptoms better or worse. Patient has no other complaints at this time. REVIEW OF SYSTEMS       Review of Systems   Constitutional: Negative for chills, fatigue and fever. HENT: Positive for sore throat. Negative for congestion, ear pain and trouble swallowing. Eyes: Negative for visual disturbance. Respiratory: Positive for cough. Negative for shortness of breath. Cardiovascular: Negative for chest pain, palpitations and leg swelling. Gastrointestinal: Negative for abdominal pain, blood in stool, constipation, diarrhea, nausea and vomiting. Genitourinary: Negative for dysuria and flank pain. Musculoskeletal: Negative for arthralgias, back pain, myalgias and neck pain. Skin: Negative for color change, rash and wound.    Neurological: Negative for dizziness, weakness, light-headedness, numbness and headaches. Psychiatric/Behavioral: Negative for confusion and suicidal ideas. All other systems reviewed and are negative. Negative in 10 essential Systems except as mentioned above and in the HPI. PAST MEDICAL HISTORY     Past Medical History:   Diagnosis Date    Anxiety     Arthritis     Asthma     Bipolar I disorder, most recent episode (or current) depressed, unspecified 9/12/2014    Clostridium difficile infection     COPD (chronic obstructive [facility] disease) [facility]     Depression     Disease of blood and blood forming organ     Eczema     Fracture, metacarpal     R 4th and 5th    Gastric ulcer     Gastritis 06/13/2018    GERD (gastroesophageal reflux disease)     GI bleed     H. 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[address]     Substance abuse ([facility]     Suicidal ideation     Suicide attempt by hanging ([facility]     Tobacco dependence     Ulcerative colitis ([facility]     UTI (urinary tract infection)          SURGICAL HISTORY      has a past surgical history that includes Colonoscopy; bronchoscopy; other surgical history; Upper gastrointestinal endoscopy (2/4/16); pr egd transoral biopsy single/multiple (N/A, 3/20/2017); sigmoidoscopy (N/A, 3/20/2017); Cholecystectomy, laparoscopic (07/14/2017); pr esophagogastroduodenoscopy transoral diagnostic (N/A, 8/9/2017); pr colonoscopy w/biopsy single/multiple (8/9/2017); Abdomen surgery; Upper gastrointestinal endoscopy (N/A, 6/13/2018); Upper gastrointestinal endoscopy (N/A, 9/20/2018); and Endoscopy, colon, diagnostic.       CURRENT MEDICATIONS       Previous Medications    ACETAMINOPHEN (TYLENOL) 500 MG TABLET    Take 1 tablet by mouth every 4 hours as needed for Pain or Fever    ALUMINUM & MAGNESIUM HYDROXIDE-SIMETHICONE (MAALOX) 200-200-20 MG/5ML SUSP SUSPENSION    Take 5 mLs by mouth every 6 hours as needed for Indigestion    ASCORBIC ACID (VITAMIN C) 500 MG TABLET    Take 1 tablet by mouth 2 times daily for 7 days    BENZOCAINE-MENTHOL (CEPACOL) 15-4 MG LOZG LOZENGE    Take 1 lozenge by mouth every 2 hours as needed for Sore Throat    BUPRENORPHINE-NALOXONE (SUBOXONE) 12-3 MG SUBLINGUAL FILM    Place 1 Film under the tongue daily for 14 days. CLONIDINE (CATAPRES) 0.1 MG TABLET    Take 1 tablet by mouth 3 times daily    GABAPENTIN (NEURONTIN) 600 MG TABLET    Take 1 tablet by mouth 4 times daily for 30 days. HYDROXYZINE (ATARAX) 50 MG TABLET    Take 50 mg by mouth 2 times daily    METFORMIN (GLUCOPHAGE) 500 MG TABLET    Take 1 tablet by mouth 2 times daily (with meals)    MULTIPLE VITAMINS-MINERALS (THERAPEUTIC MULTIVITAMIN-MINERALS) TABLET    Take 1 tablet by mouth daily    PANTOPRAZOLE (PROTONIX) 40 MG TABLET    Take 1 tablet by mouth every morning (before breakfast)    PROMETHAZINE (PHENERGAN) 25 MG TABLET    Take 1 tablet by mouth 4 times daily as needed for Nausea    QUETIAPINE (SEROQUEL) 100 MG TABLET    Take 1 tablet by mouth nightly    SUCRALFATE (CARAFATE) 1 GM TABLET    Take 1 tablet by mouth 4 times daily    SUVOREXANT (BELSOMRA) 20 MG TABS    Take 1 tablet by mouth nightly for 30 days. TIZANIDINE (ZANAFLEX) 4 MG TABLET    Take 1 tablet by mouth every 8 hours as needed (muscle spasms)    TRAZODONE (DESYREL) 50 MG TABLET    Take 1 tablet by mouth nightly as needed for Sleep    ZINC SULFATE (ZINCATE) 220 (50 ZN) MG CAPSULE    Take 1 capsule by mouth daily for 7 days       ALLERGIES     is allergic to dicyclomine, famotidine, geodon [ziprasidone hcl], haloperidol, iv dye [iodides], olanzapine, reglan [metoclopramide], ibuprofen, aspirin, dicyclomine hcl, flagyl [metronidazole], iodine, and mirtazapine.     FAMILY HISTORY     He indicated that his mother is alive. He indicated that his father is alive. He indicated that the status of his sister is unknown. He indicated that the status of his other is unknown. He indicated that the status of his paternal cousin is unknown.     family history includes Alcohol Abuse in his father; Arthritis in his father and mother; Colon Cancer (age of onset: 43) in his paternal cousin; Depression in his brother and father; Diabetes in his brother and father; High Blood Pressure in his father; Mental Illness in his brother; Migraines in his brother, father, mother, and sister; Other in his brother, brother, father, mother, and sister; Stroke in an other family member. SOCIAL HISTORY      reports that he has been smoking cigarettes. He has a 7.50 pack-year smoking history. He has never used smokeless tobacco. He reports previous alcohol use. He reports previous drug use. Drug: Opiates . PHYSICAL EXAM     INITIAL VITALS:  height is 5' 9\" (1.753 m) and weight is 250 lb (113.4 kg). His oral temperature is 98.1 °F (36.7 °C). His blood pressure is 118/68 and his pulse is 73. His respiration is 16 and oxygen saturation is 97%. Physical Exam  Vitals and nursing note reviewed. Constitutional:       General: He is not in acute distress. HENT:      Head: Normocephalic and atraumatic. Mouth/Throat:      Mouth: Mucous membranes are moist.      Pharynx: Oropharynx is clear. Eyes:      Conjunctiva/sclera: Conjunctivae normal.      Pupils: Pupils are equal, round, and reactive to light. Cardiovascular:      Rate and Rhythm: Normal rate and regular rhythm. Heart sounds: Normal heart sounds. No murmur heard. Pulmonary:      Effort: Pulmonary effort is normal. No respiratory distress. Breath sounds: Wheezing present. Abdominal:      General: Bowel sounds are normal. There is no distension. Palpations: Abdomen is soft. Tenderness: There is no abdominal tenderness.    Musculoskeletal:         General: No tenderness. Cervical back: Neck supple. Lymphadenopathy:      Cervical: No cervical adenopathy. Skin:     General: Skin is warm and dry. Findings: No rash. Neurological:      Mental Status: He is alert and oriented to person, place, and time. Psychiatric:         Judgment: Judgment normal.           DIFFERENTIAL DIAGNOSIS/MDM:   35-year-old male presents with requesting to get back on his psychiatric medications. He is afebrile, nontoxic, normal vital signs. No acute distress. Reports a fever at home however is afebrile here. He is some scattered wheezing on lung exam however smokes cigarettes. Not any respiratory distress. We will have  evaluate. He has no active thoughts of suicide or homicide.  evaluated patient. We do not see reason for patient to be admitted to [facility]. He seems to have appropriate follow-up. He is not suicidal or homicidal does not seem to be a threat to himself or anybody else. I am concerned that there is at least some degree of secondary gain with this patient I am concerned he is malingering without any time he has been to the emergency department with similar complaints. As far as his sore throat cough he was diagnosed with viral pharyngitis yesterday which I agree with. Advised to be compliant with medication refills and return if any symptoms worsen. Agreeable to plan will be discharged home today.     DIAGNOSTIC RESULTS     EKG: All EKG's are interpreted by the Emergency Department Physician who either signs or Co-signs this chart in the absence of a cardiologist.        RADIOLOGY:   I directly visualized the following  images and reviewed the radiologist interpretations:  No orders to display           ED BEDSIDE ULTRASOUND:      LABS:  Labs Reviewed - No data to display      EMERGENCY DEPARTMENT COURSE:   Vitals:    Vitals:    11/20/21 0048   BP: 118/68   Pulse: 73   Resp: 16   Temp: 98.1 °F (36.7 °C)   TempSrc: Oral SpO2: 97%   Weight: 250 lb (113.4 kg)   Height: 5' 9\" (1.753 m)              CRITICALCARE:      CONSULTS:  None      PROCEDURES:      FINAL IMPRESSION      1. Malingering          DISPOSITION/PLAN   DISPOSITION Discharge - Pending Orders Complete 11/20/2021 01:13:22 AM          PATIENT REFERRED TO:  No follow-up provider specified. DISCHARGE MEDICATIONS:  New Prescriptions    No medications on file       The care is provided during an unprecedented national emergency due to the novel coronavirus, COVID-19.     (Please note that portions ofthis note were completed with a voice recognition program.  Efforts were made to edit the dictations but occasionally words are mis-transcribed.)    Randy Cloud DO  Attending Emergency Physician         Randy Cloud DO  11/20/21 4981

## 2021-11-21 VITALS
TEMPERATURE: 97.6 F | HEART RATE: 71 BPM | HEIGHT: 68 IN | SYSTOLIC BLOOD PRESSURE: 111 MMHG | OXYGEN SATURATION: 98 % | BODY MASS INDEX: 33.8 KG/M2 | RESPIRATION RATE: 14 BRPM | DIASTOLIC BLOOD PRESSURE: 65 MMHG | WEIGHT: 223 LBS

## 2021-11-21 PROBLEM — F33.2 SEVERE RECURRENT MAJOR DEPRESSION WITHOUT PSYCHOTIC FEATURES (HCC): Status: ACTIVE | Noted: 2021-11-21

## 2021-11-21 PROCEDURE — 6370000000 HC RX 637 (ALT 250 FOR IP): Performed by: PSYCHIATRY & NEUROLOGY

## 2021-11-21 PROCEDURE — 99232 SBSQ HOSP IP/OBS MODERATE 35: CPT | Performed by: PSYCHIATRY & NEUROLOGY

## 2021-11-21 PROCEDURE — 1240000000 HC EMOTIONAL WELLNESS R&B

## 2021-11-21 PROCEDURE — 6360000002 HC RX W HCPCS: Performed by: PSYCHIATRY & NEUROLOGY

## 2021-11-21 PROCEDURE — APPSS60 APP SPLIT SHARED TIME 46-60 MINUTES: Performed by: PSYCHIATRY & NEUROLOGY

## 2021-11-21 RX ORDER — GABAPENTIN 600 MG/1
600 TABLET ORAL 4 TIMES DAILY
Status: DISCONTINUED | OUTPATIENT
Start: 2021-11-21 | End: 2021-11-22 | Stop reason: HOSPADM

## 2021-11-21 RX ORDER — SUCRALFATE 1 G/1
1 TABLET ORAL 4 TIMES DAILY
Status: DISCONTINUED | OUTPATIENT
Start: 2021-11-21 | End: 2021-11-22 | Stop reason: HOSPADM

## 2021-11-21 RX ORDER — CHLORPROMAZINE HYDROCHLORIDE 25 MG/1
50 TABLET, FILM COATED ORAL 2 TIMES DAILY PRN
Status: DISCONTINUED | OUTPATIENT
Start: 2021-11-21 | End: 2021-11-22 | Stop reason: HOSPADM

## 2021-11-21 RX ORDER — QUETIAPINE FUMARATE 100 MG/1
100 TABLET, FILM COATED ORAL NIGHTLY
Status: DISCONTINUED | OUTPATIENT
Start: 2021-11-21 | End: 2021-11-22 | Stop reason: HOSPADM

## 2021-11-21 RX ORDER — CLONIDINE HYDROCHLORIDE 0.1 MG/1
0.1 TABLET ORAL 3 TIMES DAILY
Status: DISCONTINUED | OUTPATIENT
Start: 2021-11-21 | End: 2021-11-22 | Stop reason: HOSPADM

## 2021-11-21 RX ORDER — PANTOPRAZOLE SODIUM 40 MG/1
40 TABLET, DELAYED RELEASE ORAL
Status: DISCONTINUED | OUTPATIENT
Start: 2021-11-21 | End: 2021-11-22 | Stop reason: HOSPADM

## 2021-11-21 RX ORDER — BUPRENORPHINE AND NALOXONE 2; .5 MG/1; MG/1
1 FILM, SOLUBLE BUCCAL; SUBLINGUAL DAILY
Status: DISCONTINUED | OUTPATIENT
Start: 2021-11-21 | End: 2021-11-22 | Stop reason: HOSPADM

## 2021-11-21 RX ADMIN — GABAPENTIN 600 MG: 600 TABLET, FILM COATED ORAL at 16:37

## 2021-11-21 RX ADMIN — QUETIAPINE FUMARATE 100 MG: 100 TABLET, FILM COATED ORAL at 20:50

## 2021-11-21 RX ADMIN — NICOTINE POLACRILEX 4 MG: 4 GUM, CHEWING BUCCAL at 19:45

## 2021-11-21 RX ADMIN — METFORMIN HYDROCHLORIDE 500 MG: 500 TABLET ORAL at 16:36

## 2021-11-21 RX ADMIN — GABAPENTIN 600 MG: 600 TABLET, FILM COATED ORAL at 20:49

## 2021-11-21 RX ADMIN — GABAPENTIN 600 MG: 600 TABLET, FILM COATED ORAL at 08:32

## 2021-11-21 RX ADMIN — TRAZODONE HYDROCHLORIDE 50 MG: 50 TABLET ORAL at 20:46

## 2021-11-21 RX ADMIN — NICOTINE POLACRILEX 4 MG: 4 GUM, CHEWING BUCCAL at 18:46

## 2021-11-21 RX ADMIN — BUPRENORPHINE AND NALOXONE 1 FILM: 2; .5 FILM BUCCAL; SUBLINGUAL at 20:49

## 2021-11-21 RX ADMIN — METFORMIN HYDROCHLORIDE 500 MG: 500 TABLET ORAL at 08:31

## 2021-11-21 RX ADMIN — SUCRALFATE 1 G: 1 TABLET ORAL at 13:11

## 2021-11-21 RX ADMIN — NICOTINE POLACRILEX 4 MG: 4 GUM, CHEWING BUCCAL at 21:03

## 2021-11-21 RX ADMIN — NICOTINE POLACRILEX 4 MG: 4 GUM, CHEWING BUCCAL at 08:08

## 2021-11-21 RX ADMIN — NICOTINE POLACRILEX 4 MG: 4 GUM, CHEWING BUCCAL at 16:37

## 2021-11-21 RX ADMIN — CLONIDINE HYDROCHLORIDE 0.1 MG: 0.1 TABLET ORAL at 13:11

## 2021-11-21 RX ADMIN — GABAPENTIN 600 MG: 600 TABLET, FILM COATED ORAL at 13:10

## 2021-11-21 RX ADMIN — SUCRALFATE 1 G: 1 TABLET ORAL at 16:36

## 2021-11-21 RX ADMIN — PANTOPRAZOLE SODIUM 40 MG: 40 TABLET, DELAYED RELEASE ORAL at 08:31

## 2021-11-21 RX ADMIN — SUCRALFATE 1 G: 1 TABLET ORAL at 08:31

## 2021-11-21 RX ADMIN — CLONIDINE HYDROCHLORIDE 0.1 MG: 0.1 TABLET ORAL at 08:32

## 2021-11-21 RX ADMIN — NICOTINE POLACRILEX 4 MG: 4 GUM, CHEWING BUCCAL at 11:30

## 2021-11-21 RX ADMIN — CLONIDINE HYDROCHLORIDE 0.1 MG: 0.1 TABLET ORAL at 20:49

## 2021-11-21 RX ADMIN — NICOTINE POLACRILEX 4 MG: 4 GUM, CHEWING BUCCAL at 22:22

## 2021-11-21 ASSESSMENT — PAIN SCALES - GENERAL: PAINLEVEL_OUTOF10: 0

## 2021-11-21 NOTE — CARE COORDINATION
Psychosocial Assessment    Current Level of Psychosocial Functioning     Independent  X  Dependent    Minimal Assist     Comments:      Psychosocial High Risk Factors (check all that apply)    Unable to obtain meds   Chronic illness/pain    Substance abuse  XX  Lack of Family Support   Financial stress   Isolation   Inadequate Community Resources  Suicide attempt(s)  Not taking medications  XX  Victim of crime   Developmental Delay  Unable to manage personal needs    Age 72 or older   Homeless  No transportation   Readmission within 30 days XX  Unemployment  Traumatic Event    Family/Supports identified: Per chart pt does have some supportive family members        Patient Strengths: Pt has income and insurance     Patient Barriers: Substance abuse and lack of mental health coping skills       CMHC/MH history: History of being linked to [facility]:  medication management, group/individual therapies, family meetings, psycho -education, treatment team meetings to assist with stabilization    Initial Discharge Plan: To be determined by the doctor     Clinical Summary:  Pt is a 32year old male admitted to the [facility]. Pt refused to complete assessment stating \"I don't feel well. I need my medications. \" After being reminded that doctor will not be prescribing suboxone the pt stated [facility] didn't tell me that, what's the point of me being here then. \" Pt then covered head with blanket refusing to speak.

## 2021-11-21 NOTE — PLAN OF CARE
Problem: Depressive Behavior With or Without Suicide Precautions:  Goal: Ability to disclose and discuss suicidal ideas will improve  Description: Ability to disclose and discuss suicidal ideas will improve  Outcome: Ongoing     Problem: Depressive Behavior With or Without Suicide Precautions:  Goal: Absence of self-harm  Description: Absence of self-harm  Outcome: Ongoing     Patient requested to be discharged after he was reminded that he would not be prescribed controlled substances. He stayed in his room for the shift. No self harm noted this shift. Safety precautions in place and Q 15 minute checks completed.

## 2021-11-21 NOTE — H&P
Department of Psychiatry  Attending Physician Psychiatric Assessment     Reason for Admission to Psychiatric Unit:  Concerns about patient's safety in the community    CHIEF COMPLAINT: Suicidal ideation    History obtained from: Patient, electronic medical record          HISTORY OF PRESENT ILLNESS:    Jan Caceres is a 32 y.o. male who has a past medical history of depression, schizoaffective disorder and malingering with  5 visits to the emergency room in the 24 hours prior to transitioning to inpatient psychiatry. Per ED records, \"  Julieta Zamora a single 32 y.o.  male who presents to the ED for anxiety , abdominal pain and suicidal  / homicidal ideations has been seen in 5 ERs in the last 24 hours. Patient is denying AH VH he has a history of chronic abdominal pain, GERD,.  Patient complaining of typical abdominal pain, was scheduled to follow-up with GI however has not done so, patient is Ox4, hygiene is poor, clothing is dirty, patient has backpack with meds and scripts and personal hygiene products from prior admission and other EDs, he is guarded, labile, speech is pressured and patient is irritable when asked questions. Gurwinder Romo was seen by his primary care provider yesterday for chronic opioid abuse. Gurwinder Romo has been seen at numerous emergency departments for the symptoms.  Patient  eloped from [place] yesterday has been seen over 10 times in the last 30 days for the symptoms and had complete work-ups and does not follow through with med and psych consults.  Seen yesterday and did have lab work which was unremarkable patient did test positive for cocaine.  Patient denies use any opioids.  Patient's main complaint is sore throat and SI HI and is observed eating sunflower seeds in front of staff in triage.   Patient reports poor sleep and appetite for last several days, he reports his brother's house is \"full of drugs\" patient recently had suboxone refilled but does not have any with him he reports his brother may have stolen them or they are at his brother's house, he stated he has an appointment on 12/2 for his refill. Patient reports if he leaves here he is harm himself and others but gives no specific plan.       At diagnostic assessment patient is cooperative, polite and soft-spoken. He reports his depressive symptoms have been severe and greater for a 2-week timeframe almost every day all day he has a low mood, difficulty with concentration, significant anhedonia, poor sleep and feelings of hopelessness and helplessness. He reports that he continues to struggle with managing his substance use and is still very disappointed that he was required to leave a residential program last month. He has been to the emergency department many times regarding suicidal thoughts and reports that he had plans to either shoot himself or overdose as his life is not worth living. Yovani Garrido denies prior symptoms of lacey including increased goal-directed activity with decreased need for sleep rapid speech or racing thoughts outside of the use of drugs. He denies auditory or visual hallucinations or concerns that people are watching or talking about him. He denies believing he has magical bay or ever receives messages from the television or social media. He denies experiencing panic attacks but he does endorse symptoms of anxiety reporting that he worries excessively, leading to restlessness, muscle fatigue and tension. He denies intrusive or persistent thoughts that are relieved by repetitive behaviors. Patient denies phobia, fear of abandonment, decreased self-esteem or utilizing self damaging behaviors as coping mechanisms. He denies aggression or violence or forensic history and reports that he \"just keeps to himself \".   He reports that he continues to live with his brother Pedro Waldron he considers a support system and confirms that he may return to their home once his symptoms are stabilized and he is able to contract for safety in the community. Currently he continues to endorse suicidal ideation and is appreciative of the safety of the milieu. He currently contracts for safety on this unit and is encouraged to attend group programming and other milieu opportunities as well as reach out to support staff as required one-to-one time. History of head trauma: [x] Yes [] No evident scar at right forehead patient reports injury as child with sutures recalls loss of consciousness    History of seizures: [x] Yes [] No during placement of nerve stimulator in 2018-isolated event    History of violence or aggression: [] Yes [x] No         PSYCHIATRIC HISTORY:  [x] Yes [] No    Currently follows with no community provider as he has been noncompliant with follow-up appointments. Previously linked with [facility]    2 lifetime suicide attempts 1 by wrist laceration as a child and another by overdose. Multiple psychiatric hospital admissions including most recently [facility] 10/30/2021, 6/27/2021, 2/2019. Home Medication Compliance: [] Yes [x] No    Past psychiatric medications includes: Clonidine, Remeron, Geodon, Cymbalta, Abilify, Aristada, Neurontin, trazodone, Seroquel, Suboxone, methadone, Wellbutrin, Zyprexa, Haldol    Adverse reactions from psychotropic medications: [x] Yes [] No  Endorses reaction to Haldol including throat swelling and thick tongue.   Anaphylaxis with Geodon, Remeron increase agitation and decreased sleep improvement       Lifetime Psychiatric Review of Systems         Depression: Endorses     Anxiety: Endorses     Panic Attacks: Denies     Elizabeth or Hypomania: Denies     Phobias: Denies     Obsessions and Compulsions: Denies     Visual Hallucinations: Denies     Auditory Hallucinations: Denies     Delusions: Denies     Paranoia: Denies     PTSD: Denies    Past Medical History:        Diagnosis Date    Anxiety     Arthritis     Asthma     Bipolar I disorder, most recent episode (or current) depressed, unspecified 9/12/2014    Clostridium difficile infection     COPD (chronic obstructive pulmonary disease) (HCC)     Depression     Disease of blood and blood forming organ     Eczema     Fracture, metacarpal     R 4th and 5th    Gastric ulcer     Gastritis 06/13/2018    GERD (gastroesophageal reflux disease)     GI bleed     H. 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[address]     Substance abuse ([address]     Hx of opiates, benzos, cocaine, alcohol abuse    Suicidal ideation     Suicide attempt by hanging ([address]     Tobacco dependence     Ulcerative colitis ([address]     UTI (urinary tract infection)        Past Surgical History:        Procedure Laterality Date    ABDOMEN SURGERY      upper GI scope 7/7/2015    BRONCHOSCOPY      CHOLECYSTECTOMY, LAPAROSCOPIC  07/14/2017    surgery performed at [address] [place] [address], COLON, DIAGNOSTIC      OTHER SURGICAL HISTORY      lumbar puncture    [place] COLONOSCOPY W/BIOPSY SINGLE/MULTIPLE  8/9/2017    COLONOSCOPY WITH BIOPSY performed by Russ Cmap MD at [facility]    [place] EGD TRANSORAL BIOPSY SINGLE/MULTIPLE N/A 3/20/2017    EGD BIOPSY performed by Reji Guevara MD at [facility]    [place] ESOPHAGOGASTRODUODENOSCOPY TRANSORAL DIAGNOSTIC N/A 8/9/2017    EGD ESOPHAGOGASTRODUODENOSCOPY performed by Russ Camp MD at [facility] N/A 3/20/2017    SIGMOIDOSCOPY DIAGNOSTIC FLEXIBLE performed by Reji Guevara MD at [address] [place] [address]  2/4/16    UPPER GASTROINTESTINAL ENDOSCOPY N/A 6/13/2018    GASTRITIS    UPPER GASTROINTESTINAL ENDOSCOPY N/A 9/20/2018    EGD BIOPSY performed by Jonah Zhong MD at [facility] OR       Allergies:  Dicyclomine, Famotidine, Geodon [ziprasidone hcl], Haloperidol, Iv dye [iodides], Olanzapine, Reglan [metoclopramide], Ibuprofen, Aspirin, Dicyclomine hcl, Hydroxyzine hcl, Iodine, Metronidazole, Mirtazapine, and Ziprasidone         Social History:     Born in: Born and raised in [place]  Family: Mother father no longer in [facility]. Reports father lives in [facility] and mother [facility]. Currently resides with his brother \"Michael\"  Highest Level of Education: Received GED  Occupation: SSDI currently, he does have interest in getting a job  Marital Status: Single  Children: 1 daughter age 5 lives with mother at [address]: Private residence-may return to brother's home  Stressors: Severe symptoms of depression, difficulty maintaining abstinence from drugs  Patient Assets/Supportive Factors: Willingness to ask for help, supportive family         DRUG USE HISTORY  Social History     Tobacco Use   Smoking Status Current Every Day Smoker    Packs/day: 0.50    Years: 15.00    Pack years: 7.50    Types: Cigarettes   Smokeless Tobacco Never Used     Social History     Substance and Sexual Activity   Alcohol Use Yes    Comment: refused to answer how much but states he has been partaking more since his discharge from tx     Social History     Substance and Sexual Activity   Drug Use Yes    Types: Opiates , Cocaine    Comment: Hx of opiates, benzos, cocaine, alcohol abuse     Long history of opiate use. Reports he was previously involved in a 12-month program at [facility] that was closed prior to his last admission to [facility]. Extensive history of recreational substance use including cocaine, alcohol, benzodiazepines and opioids.   He has also shown drug-seeking behavior for Phenergan       LEGAL HISTORY:   HISTORY OF INCARCERATION: [] Yes [x] No    Family History:       Problem Relation Age of Onset    Diabetes Father     Alcohol Abuse Father     Depression Father     Arthritis Father     High Blood Pressure Father     Other Father         aneurysm & epilepsy    Migraines Father     Arthritis Mother     Other Mother         aneurysm & epilepsy    Migraines Mother     Diabetes Brother         Aunt and uncles    Depression Brother     Mental Illness Brother     Other Brother         epilepsy    Migraines Brother     Stroke Other         Uncle    Other Brother         murdered Oct 6th, 2014    Colon Cancer Paternal Cousin 43    Other Sister         epilepsy    Migraines Sister        Psychiatric Family History    Patient endorses psychiatric family history reporting depression and anxiety. Suicides in family: [] Yes [x] No    Substance use in family: [] Yes [x] No         PHYSICAL EXAM:  Vitals:  /71   Pulse 81   Temp 97.5 °F (36.4 °C) (Oral)   Resp 14   Ht 5' 8\" (1.727 m)   Wt 223 lb (101.2 kg)   SpO2 98%   BMI 33.91 kg/m²     Pain Level: Denies currently, history of chronic sciatica pain with placed nerve stimulator at right inner thigh since 2018. Reports connected with neurosurgery at [address] [place] [address]. Previously involved with pain management, denies current involvement. LABS:  Labs reviewed: [x] Yes  BUN/creatinine ratio 22, anion gap 7, glucose 117, bilirubin 0.20, hemoglobin 12.8, hematocrit 39.1, eosin % 5  UDS positive buprenorphine and cocaine metabolite  Last EKG in EMR reviewed: [x] Yes  QTc: 438          Review of Systems   Constitutional: Negative for chills and weight loss. HENT: Negative for ear pain and nosebleeds. Eyes: Negative for blurred vision and photophobia. Respiratory: Negative for cough, shortness of breath and wheezing. Cardiovascular: Negative for chest pain and palpitations.    Gastrointestinal: Negative for abdominal pain, diarrhea and vomiting. Genitourinary: Negative for dysuria and urgency. Musculoskeletal: Negative for falls and joint pain. Skin: Negative for itching and rash. Neurological: Negative for tremors, seizures and weakness. Endo/Heme/Allergies: Does not bruise/bleed easily. Physical Exam:   Constitutional:  Appears well-developed and well-nourished, no acute distress. HENT:   Head: Normocephalic and atraumatic. Eyes: Conjunctivae are normal. Right eye exhibits no discharge. Left eye exhibits no discharge. No scleral icterus. Neck: Normal range of motion. Neck supple. Pulmonary/Chest:  No respiratory distress or accessory muscle use, no wheezing. Cardiac: Regular rate and rhythm. Abdominal: Soft. Non-tender. Exhibits no distension. Musculoskeletal: Normal range of motion. Exhibits no edema. Neurological: cranial nerves II-XII grossly in tact, normal gait and station. Skin: Skin is warm and dry. Patient is not diaphoretic. No erythema. Mental Status Examination:    Level of consciousness: Awake and alert  Appearance:  Appropriate attire, resting in bed, fair grooming   Behavior/Motor: Approachable, no psychomotor abnormalities noted  Attitude toward examiner:  Cooperative, attentive, good eye contact  Speech: Normal rate, volume, and tone. Mood: Depressed  Affect:  Blunted  Thought processes: Linear, goal directed and coherent  Thought content: Active suicidal ideations, with current plan or intent, contracts for safety on the unit. Denies homicidal ideations               Denies visual hallucinations. Denies auditory hallucinations.               Denies delusions              Denies paranoia  Cognition:  Oriented to self, location, time, situation  Concentration: Clinically adequate  Memory: Intact  Insight &Judgment: Poor         DSM-5 Diagnosis    Principal Problem: Severe recurrent major depression without psychotic features ([facility]      History of schizoaffective disorder bipolar type  Opiate use disorder in remission with MAT  Cocaine abuse  MORGAN   Consider substance-induced mood disorder    Psychosocial and Contextual factors:  Financial: Endorses  Occupational: Endorses  Relationship: Endorses  Legal: Denies  Living situation: Denies  Educational: Denies    Past Medical History:   Diagnosis Date    Anxiety     Arthritis     Asthma     Bipolar I disorder, most recent episode (or current) depressed, unspecified 9/12/2014    Clostridium difficile infection     COPD (chronic obstructive pulmonary disease) ([facility]     Depression     Disease of blood and blood forming organ     Eczema     Fracture, metacarpal     R 4th and 5th    Gastric ulcer     Gastritis 06/13/2018    GERD (gastroesophageal reflux disease)     GI bleed     H. 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[facility]     Neuromuscular disorder ([facility]     PFAPA syndrome ([facility]     PUD (peptic ulcer disease)     Rheumatoid arthritis ([facility]     Rheumatoid arthritis(714.0)     Severe recurrent major depression without psychotic features ([facility] 11/21/2021    Sleep apnea     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[facility]     Substance abuse ([facility]     Hx of opiates, benzos, cocaine, alcohol abuse    Suicidal ideation     Suicide attempt by hanging ([address]     Tobacco dependence     Ulcerative colitis ([address]     UTI (urinary tract infection)         TREATMENT PLAN    Continue inpatient psychiatric treatment. Home medications reviewed. Patient is aware that Suboxone and Phenergan will not be restarted agreed to following home medications:  Clonidine 0.1 mg 3 times daily  Neurontin 600 mg 4 times daily  Glucophage 500 mg twice daily  Protonix 40 mg daily before breakfast  Seroquel 100 mg at hour sleep  Carafate 1 g tablet 4 times a day  Problem list updated. Monitor need and frequency of PRN medications. Attempt to develop insight. Follow-up daily while inpatient. Reviewed risks and benefits as well as potential side effects with patient. CONSULTS [] Yes [x] No    Risk Management: close watch per standard protocol      Psychotherapy: participation in milieu and group and individual sessions with Attending Physician,  and Physician Assistant/CNP      Estimated length of stay:  2-14 days      GENERAL PATIENT/FAMILY EDUCATION  Patient will understand basic signs and symptoms, patient will understand benefits/risks and potential side effects from proposed medications, and patient will understand their role in recovery. Family is not active in patient's care. Patient assets that may be helpful during treatment include: Intent to participate and engage in treatment, sufficient fund of knowledge and intellect to understand and utilize treatments. Goals:    1) Remission of suicidal ideation  2) Stabilization of symptoms prior to discharge. 3) Establish efficacy and tolerability of medications. Behavioral Services  Medicare Certification     Admission Day 1  I certify that this patient's inpatient psychiatric hospital admission is medically necessary for:    x (1) treatment which could reasonably be expected to improve this patient's condition, or    x (2) diagnostic study or its equivalent. Time Spent: 60 minutes    Irvin Burgos is a 32 y.o. male being evaluated face to face    --Luanne PresMASSIEL contreras CNP on 11/21/2021 at 7:43 AM    An electronic signature was used to authenticate this note. I independently saw and evaluated the patient. I reviewed the midlevel provider's documentation above. Any additional comments or changes to the midlevel provider's documentation are stated below otherwise agree with assessment. The patient continues to feel very anxious. He is also depressed and has some passive suicidal thoughts. He wants to be on Benadryl or some other medication. He told me that he was taking Suboxone as prescribed.   He wants to get Suboxone here and is not expecting a Suboxone prescription because he has an outpatient provider with whom he can follow-up. PLAN  Chlorpromazine 50mg tid prn and Suboxone 2mg bid ordered. Attempt to develop insight  Psycho-education conducted. Supportive Therapy conducted. Probable discharge is 2-3 days.    Follow-up daily while on inpatient unit    Electronically signed by Gio Tim MD on 11/21/21 at 11:16 PM EST

## 2021-11-21 NOTE — BH NOTE
RN opened patient's security bag to confirm patient's medications and scripts. All of patient's belongings were placed back into the bag and sealed in a new security bag: [id number]    Patient's medications were as followed. Bottles:   pantaprazole sodium 40 mg, daily from [facility] [place]  Trazadone 50 mg, nightly from [facility] [place]  Promethazine 25 mg, up to 4 times daily [facility]  Tizanidine 4 mg, every 8 hours as needed [facility]  Tizanidine 4 mg, every 8 hours as needed [facility]  Quetiapine fumarate 100 mg, nightly [place]  Scripts:  Tylenol 500 mg, every 4 hours PRN, [place] 11/19/21  Zincate 220 mg, daily for 7 days, [facility] 11/19/21  Allegra 1 tablet, every 12 hours as needed, [facility] 11/19/21  Zithromax Z-pack 250 mg, 1 tablet daily for 6 days, [facility] 11/19/21  Cepacol 15-4mg, every 2 hours as needed, [place] 11/19/21  Vitamin C 500 mg, 1 tablet twice daily for 7 days, [facility] 11/19/21  Benadryl 25 mg.  Every 6 hours as needed, [facility], 11/13/21

## 2021-11-21 NOTE — GROUP NOTE
Group Therapy Note    Date: 11/21/2021    Group Start Time: 1400  Group End Time: 5337  Group Topic: Cognitive Skills    TIESHA An, CTRS        Group Therapy Note    Attendees: 4/17         Pt did not participate in Cognitive Skills Group at 1400 when encouraged by RT due to was resting in room. Pt was offered talk time as an alternative to group but declined.        Discipline Responsible: Psychoeducational Specialist      Signature:  Mae Kovacs

## 2021-11-21 NOTE — PLAN OF CARE
[address] [place] [address]  Initial Interdisciplinary Treatment Plan NOTE      Original treatment plan Date & Time: 11/21/2021            909AM    Admission Type:  Admission Type: Involuntary    Reason for admission:   Reason for Admission: SI no plan    Estimated Length of Stay:  5-7days  Estimated Discharge Date: to be determined by physician    PATIENT STRENGTHS:  Patient Strengths:Strengths: Positive Support, No significant Physical Illness  Patient Strengths and Limitations:Limitations: Tendency to isolate self, Lacks leisure interests, Difficulty problem solving/relies on others to help solve problems, Hopeless about future, Multiple barriers to leisure interests, Inappropriate/potentially harmful leisure interests (depression substance abuse anxiety poor coping skills)  Addictive Behavior: Addictive Behavior  In the past 3 months, have you felt or has someone told you that you have a problem with:  : None  Do you have a history of Chemical Use?: No  Do you have a history of Alcohol Use?: No  Do you have a history of Street Drug Abuse?: Yes  Histroy of Prescripton Drug Abuse?: No  Medical Problems:No past medical history on file.   Status EXAM:Status and Exam  Normal: No  Facial Expression: Elevated  Affect: Inappropriate  Level of Consciousness: Alert  Mood:Normal: No  Mood: Depressed, Anxious  Motor Activity:Normal: No  Motor Activity: Decreased  Interview Behavior: Cooperative  Preception: Horatio to Person, Favio Gores to Time, Horatio to Place, Horatio to Situation  Attention:Normal: Yes  Attention: Distractible  Thought Processes: Circumstantial  Thought Content:Normal: Yes  Thought Content: Preoccupations  Hallucinations: None  Delusions: No  Memory:Normal: Yes  Memory: Poor Recent, Confabulation  Insight and Judgment: No  Insight and Judgment: Unmotivated  Present Suicidal Ideation: No  Present Homicidal Ideation: No    EDUCATION:   Learner Progress Toward Treatment Goals: reviewed group plans and strategies for care    Method:group therapy, medication compliance, individualized assessments and care planning    Outcome: needs reinforcement    PATIENT GOALS: to be discussed with patient within 72 hours    PLAN/TREATMENT RECOMMENDATIONS:     continue group therapy , medications compliance, goal setting, individualized assessments and care, continue to monitor pt on unit      SHORT-TERM GOALS:   Time frame for Short-Term Goals: 5-7 days    LONG-TERM GOALS:  Time frame for Long-Term Goals: 6 months  Members Present in Team Meeting: See Signature Sheet

## 2021-11-21 NOTE — PLAN OF CARE
Problem: Depressive Behavior With or Without Suicide Precautions:  Goal: Ability to disclose and discuss suicidal ideas will improve  Description: Ability to disclose and discuss suicidal ideas will improve  11/21/2021 1043 by Patricia Peacock RN  Outcome: Ongoing  Note: Pt denies thoughts of self harm and is agreeable to seeking out should thoughts of self harm arise. Safe environment maintained. Q15 minute checks for safety cont per unit policy. Will cont to monitor for safety and provides support and reassurance as needed. 11/21/2021 0908 by DIANE Gao  Outcome: Ongoing  11/20/2021 2237 by Elizabeth Roe RN  Outcome: Ongoing  Goal: Absence of self-harm  Description: Absence of self-harm  11/21/2021 1043 by Patricia Peacock RN  Outcome: Ongoing  Note: Pt is currently not attempting to inflict self harm. 11/21/2021 0908 by DIANE Gao  Outcome: Ongoing  11/20/2021 2237 by Elizabeth Roe RN  Outcome: Ongoing  Goal: Participates in care planning  Description: Participates in care planning  11/21/2021 1043 by Patricia Peacock RN  Outcome: Ongoing  Note: Patient is currently refusing to attend treatment planning or groups even after much encouragement from staff. 11/21/2021 0908 by DIANE Gao  Outcome: Ongoing  11/20/2021 2237 by Elizabeth Roe RN  Outcome: Ongoing     Problem: Tobacco Use:  Goal: Inpatient tobacco use cessation counseling participation  Description: Inpatient tobacco use cessation counseling participation  11/21/2021 1043 by Patricia Peacock RN  Outcome: Ongoing  Note: Patient given tobacco quitline number 86125937160 at this time, refusing to call at this time, states \" I just dont want to quit now\"- patient given information as to the dangers of long term tobacco use. Continue to reinforce the importance of tobacco cessation.     11/21/2021 0908 by DIANE Gao  Outcome: Ongoing  11/20/2021 2237 by Elizabeth Roe RN  Outcome: Ongoing     Problem: Falls - Risk of:  Goal: Will remain free from falls  Description: Will remain free from falls  Outcome: Ongoing  Note: Pt remains free of falls and verbalizes understanding of individual fall risks. Pt wearing non skid footwear and encouraged to seek out staff for any assistance needed. Problem: Depressive Behavior With or Without Suicide Precautions:  Intervention: Assess psychological signs and symptoms of depression  Note: Patient is depressed as evidenced by flat affect, withdrawn, and isolative to self and room. Intervention: Assess nutritional intake  Note: Patient is eating % of meals in the day area. Intervention: Encourage patient setting realistic goals 1 time per day  Note: Patient's goal for the day is to speak to the doctor. Intervention: Supervise medication intake  Note: Pt is medication compliant, and received meds per drs orders. Safety checks are maintained every 15 minutes, irregular intervals, and shift change. Problem: Tobacco Use:  Intervention: Tobacco-use cessation counseling  Note: Patient is receiving nicotine gum as a smoking cessation medication. Problem: Falls - Risk of: Intervention: Assess potential safety hazards  Note: Patient has a steady gait. Intervention: Manage a safe environment  Note: Patient's room is within view of the nurse's station.

## 2021-11-21 NOTE — BH NOTE
Patient asked RN if the doctor was restarting his medications. RN told him that as of right now she was unsure of the doctor's ultimate plans for medications, but told him that medication was available tonight for sleep, and reminded him that the doctor would not be prescribing controlled substances/addictive medication. Patient then asked if he could go home. RN asked Dr. Roslyn Louise who stated that it would not be at this time, but that he would see him tomorrow.

## 2021-11-22 ENCOUNTER — TELEPHONE (OUTPATIENT)
Dept: INTERNAL MEDICINE CLINIC | Age: 31
End: 2021-11-22

## 2021-11-22 PROCEDURE — 99239 HOSP IP/OBS DSCHRG MGMT >30: CPT | Performed by: PSYCHIATRY & NEUROLOGY

## 2021-11-22 PROCEDURE — 6370000000 HC RX 637 (ALT 250 FOR IP): Performed by: PSYCHIATRY & NEUROLOGY

## 2021-11-22 PROCEDURE — 6360000002 HC RX W HCPCS: Performed by: PSYCHIATRY & NEUROLOGY

## 2021-11-22 RX ORDER — QUETIAPINE FUMARATE 100 MG/1
100 TABLET, FILM COATED ORAL NIGHTLY
Qty: 30 TABLET | Refills: 0 | Status: SHIPPED | OUTPATIENT
Start: 2021-11-22 | End: 2021-12-21 | Stop reason: SDUPTHER

## 2021-11-22 RX ORDER — CLONIDINE HYDROCHLORIDE 0.1 MG/1
0.1 TABLET ORAL 3 TIMES DAILY
Qty: 90 TABLET | Refills: 0 | Status: SHIPPED | OUTPATIENT
Start: 2021-11-22 | End: 2021-12-02 | Stop reason: SDUPTHER

## 2021-11-22 RX ORDER — CHLORPROMAZINE HYDROCHLORIDE 50 MG/1
50 TABLET, FILM COATED ORAL 2 TIMES DAILY PRN
Qty: 10 TABLET | Refills: 0 | Status: SHIPPED | OUTPATIENT
Start: 2021-11-22 | End: 2021-12-27 | Stop reason: SDUPTHER

## 2021-11-22 RX ORDER — PANTOPRAZOLE SODIUM 40 MG/1
40 TABLET, DELAYED RELEASE ORAL
Qty: 30 TABLET | Refills: 3 | Status: SHIPPED | OUTPATIENT
Start: 2021-11-23 | End: 2021-12-27 | Stop reason: SDUPTHER

## 2021-11-22 RX ORDER — SUCRALFATE 1 G/1
1 TABLET ORAL 4 TIMES DAILY
Qty: 120 TABLET | Refills: 3 | Status: SHIPPED | OUTPATIENT
Start: 2021-11-22 | End: 2021-12-27 | Stop reason: SDUPTHER

## 2021-11-22 RX ADMIN — NICOTINE POLACRILEX 4 MG: 4 GUM, CHEWING BUCCAL at 08:25

## 2021-11-22 RX ADMIN — NICOTINE POLACRILEX 4 MG: 4 GUM, CHEWING BUCCAL at 06:03

## 2021-11-22 RX ADMIN — NICOTINE POLACRILEX 4 MG: 4 GUM, CHEWING BUCCAL at 12:01

## 2021-11-22 RX ADMIN — SUCRALFATE 1 G: 1 TABLET ORAL at 08:21

## 2021-11-22 RX ADMIN — GABAPENTIN 600 MG: 600 TABLET, FILM COATED ORAL at 08:21

## 2021-11-22 RX ADMIN — BUPRENORPHINE AND NALOXONE 1 FILM: 2; .5 FILM BUCCAL; SUBLINGUAL at 08:21

## 2021-11-22 RX ADMIN — CLONIDINE HYDROCHLORIDE 0.1 MG: 0.1 TABLET ORAL at 08:21

## 2021-11-22 RX ADMIN — METFORMIN HYDROCHLORIDE 500 MG: 500 TABLET ORAL at 08:21

## 2021-11-22 RX ADMIN — GABAPENTIN 600 MG: 600 TABLET, FILM COATED ORAL at 12:01

## 2021-11-22 NOTE — TELEPHONE ENCOUNTER
Received a voicemail from Devi Fitzgerald from [facility] stating Laquita Leung can across their \"doctor shopping\" report that is ran monthly. It appears that patient is being prescribed Suboxone and gabapentin from multiple providers overlapping. He wanted to make sure you are aware of this.    Petey Weeks can be reached at [phone]

## 2021-11-22 NOTE — GROUP NOTE
Group Therapy Note    Date: 11/22/2021    Group Start Time: 1000  Group End Time: 2038  Group Topic: Group Therapy    [facility]    KRISH Daugherty LSW        Group Therapy Note    Attendees: 9/21         Patient's Goal:  Increase interpersonal relationship skills     Notes:  Patient was an active participant in group discussion     Status After Intervention:  Unchanged    Participation Level:  Active Listener and Interactive    Participation Quality: Appropriate, Attentive and Sharing      Speech:  normal      Thought Process/Content: Logical      Affective Functioning: Congruent      Mood: anxious      Level of consciousness:  Alert, Oriented x4 and Attentive      Response to Learning: Able to verbalize current knowledge/experience      Endings: None Reported    Modes of Intervention: Support, Socialization, Exploration and Clarifying      Discipline Responsible: /Counselor      Signature:  KRISH Daugherty LSW

## 2021-11-22 NOTE — GROUP NOTE
Group Therapy Note    Date: 11/22/2021    Group Start Time: 1000  Group End Time: 7544  Group Topic: Psychotherapy    [facility] Cobb, SALLY        Group Therapy Note    Attendees: 10/17         Patient was offered group therapy today but declined to participate despite encouragement from staff. 1:1 was offered.       Signature:  KRISH Arce, SALLY

## 2021-11-22 NOTE — BH NOTE
[facility]  Discharge Note    Pt discharged with followings belongings:   Dentures: None  Vision - Corrective Lenses: None  Hearing Aid: None  Jewelry: Necklace (silver chain)  Body Piercings Removed: N/A  Clothing: Socks, Footwear, Undergarments (Comment), Jacket / coat, Pants, Shirt  Were All Patient Medications Collected?: Yes  Other Valuables: Other (Comment) (lighter, phone , etc.)   Valuables sent home with patient. Valuables retrieved from safe, Security envelope number:  [phone] and returned to patient. Patient education on aftercare instructions: Yes  Information faxed to [facility] by  Patient verbalize understanding of AVS:  Yes.     Status EXAM upon discharge: Stable  Status and Exam  Normal: No  Facial Expression: Flat  Affect: Blunt  Level of Consciousness: Alert  Mood:Normal: No  Mood: Depressed, Anxious, Helpless  Motor Activity:Normal: Yes  Motor Activity: Decreased  Interview Behavior: Cooperative, Evasive  Preception: [place] to Person, Henrique Pare to Time, [place] to Place, [place] to Situation  Attention:Normal: No  Attention: Distractible, Unable to Concentrate  Thought Processes: Blocking  Thought Content:Normal: No  Thought Content: Preoccupations  Hallucinations: None  Delusions: No  Memory:Normal: Yes  Memory: Confabulation  Insight and Judgment: No  Insight and Judgment: Poor Judgment, Poor Insight, Unmotivated, Unrealistic  Present Suicidal Ideation: No  Present Homicidal Ideation: No      Metabolic Screening:    Lab Results   Component Value Date    LABA1C 5.7 10/23/2014       Lab Results   Component Value Date    CHOL 205 (H) 02/21/2017    CHOL 158 11/09/2015    CHOL 152 10/23/2014    CHOL 206 (H) 03/05/2014    CHOL 205 (H) 01/22/2014    CHOL 208 (H) 08/28/2013     Lab Results   Component Value Date    TRIG 189 (H) 02/21/2017    TRIG 223 (H) 11/09/2015    TRIG 52 10/23/2014    TRIG 104 03/05/2014    TRIG 74 01/22/2014    TRIG 104 08/28/2013     Lab Results   Component Value Date HDL 44 02/21/2017    HDL 40 (L) 11/09/2015    HDL 55 10/23/2014    HDL 54 03/05/2014    HDL 53 01/22/2014    HDL 72 (H) 08/28/2013     No components found for: LDLCAL  No results found for: LABVLDL    PT discharged to home per Dr. Freddy Boswell returned and follow up appointment scheduled. Discharge paperwork signed and reviewed. Medications filled at [address] [place] [address] and reviewed with RN. Follow up information faxed. Pt verbalizes understanding of all discharge instructions. Denies thoughts of self harm at  time of discharge.       Anastasio Goltz, RN

## 2021-11-22 NOTE — PROGRESS NOTES
Behavioral Services  Medicare Certification Upon Admission    I certify that this patient's inpatient psychiatric hospital admission is medically necessary for:    [x] (1) Treatment which could reasonably be expected to improve this patient's condition,       [x] (2) Or for diagnostic study;     AND     [x](2) The inpatient psychiatric services are provided while the individual is under the care of a physician and are included in the individualized plan of care.     Estimated length of stay/service -3-5 days    Plan for post-hospital care -outpatient care    Electronically signed by Dong Sotomayor MD on 11/21/2021 at 11:16 PM

## 2021-11-22 NOTE — PLAN OF CARE
Problem: Depressive Behavior With or Without Suicide Precautions:  Goal: Ability to disclose and discuss suicidal ideas will improve  Description: Ability to disclose and discuss suicidal ideas will improve  11/21/2021 2143 by Fani Lynne RN  Outcome: Ongoing  Patient denies suicidal ideation, homicidal ideation and hallucinations at this time. Problem: Depressive Behavior With or Without Suicide Precautions:  Goal: Absence of self-harm  Description: Absence of self-harm  11/21/2021 2143 by Fani Lynne RN  Outcome: Ongoing  Patient has made no attempt to harm self at this time. Problem: Depressive Behavior With or Without Suicide Precautions:  Goal: Participates in care planning  Description: Participates in care planning  11/21/2021 2143 by Fani Lynne RN  Outcome: Ongoing  Safety plan reviewed with patient, agrees to approach staff when feeling upset. 15 minute and random checks maintained for safety. No violent or escalating behaviors noted during this shift. Patient is currently calm, controlled and medication-compliant. Patient is at desk frequently with multiple requests, and often changing his mind. He displays medication-seeking behavior, trying to see what he can get. He is anxious and focused on discharge.      Problem: Tobacco Use:  Goal: Inpatient tobacco use cessation counseling participation  Description: Inpatient tobacco use cessation counseling participation  11/21/2021 2143 by Fani Lynne RN  Outcome: Ongoing  Discussed with patient the benefits to quitting smoking and potential dangers of tobacco.

## 2021-11-22 NOTE — PLAN OF CARE
Problem: Depressive Behavior With or Without Suicide Precautions:  Goal: Ability to disclose and discuss suicidal ideas will improve  Description: Ability to disclose and discuss suicidal ideas will improve  11/22/2021 1407 by Nancy Doty RN  Outcome: Completed     Problem: Depressive Behavior With or Without Suicide Precautions:  Goal: Absence of self-harm  Description: Absence of self-harm  11/22/2021 1407 by Nancy Doty RN  Outcome: Completed     Problem: Depressive Behavior With or Without Suicide Precautions:  Goal: Participates in care planning  Description: Participates in care planning  Outcome: Completed     Problem: Tobacco Use:  Goal: Inpatient tobacco use cessation counseling participation  Description: Inpatient tobacco use cessation counseling participation  Outcome: Completed     Problem: Falls - Risk of:  Goal: Will remain free from falls  Description: Will remain free from falls  Outcome: Completed

## 2021-11-22 NOTE — DISCHARGE SUMMARY
DISCHARGE SUMMARY      Patient ID:  Ellis Giles  [id number]  2 y.o.  1990    Admit date: 11/20/2021    Discharge date and time: 11/22/2021    Disposition: Home     Admitting Physician: Min Phelan MD     Discharge Physician: Dr Teresita Warren MD    Admission Diagnoses: Schizoaffective disorder, unspecified type ([facility] [address] [F25.9]  Depression with suicidal ideation [F32. A, R45.851]    Admission Condition: poor    Discharged Condition: stable    Admission Circumstance: Ellis Giles is a 32 y.o. male who has a past medical history of depression, schizoaffective disorder and malingering with  5 visits to the emergency room in the 24 hours prior to transitioning to inpatient psychiatry.     Per ED records, \"Hardik Bruno a single 31 y. o.  male who presents to the ED for anxiety , abdominal pain and suicidal  / homicidal ideations has been seen in 5 ERs in the last 24 hours.  Patient is denying AH VH he has a history of chronic abdominal pain, GERD,.  Patient complaining of typical abdominal pain, was scheduled to follow-up with GI however has not done so, patient is Ox4, hygiene is poor, clothing is dirty, patient has backpack with meds and scripts and personal hygiene products from prior admission and other EDs, he is guarded, labile, speech is pressured and patient is irritable when asked questions.  Patient was seen by his primary care provider yesterday for chronic opioid abuse. Deandre Mckinley has been seen at numerous emergency departments for the symptoms.  Patient  eloped from [place] yesterday has been seen over 10 times in the last 30 days for the symptoms and had complete work-ups and does not follow through with med and psych consults.  Seen yesterday and did have lab work which was unremarkable patient did test positive for cocaine.  Patient denies use any opioids.  Patient's main complaint is sore throat and SI HI and is observed eating sunflower seeds in front of staff in triage.  Patient reports poor sleep and appetite for last several days, he reports his brother's house is \"full of drugs\" patient recently had suboxone refilled but does not have any with him he reports his brother may have stolen them or they are at his brother's house, he stated he has an appointment on 12/2 for his refill. Deandre Mckinley reports if he leaves here he is harm himself and others but gives no specific plan.        At diagnostic assessment patient is cooperative, polite and soft-spoken. He reports his depressive symptoms have been severe and greater for a 2-week timeframe almost every day all day he has a low mood, difficulty with concentration, significant anhedonia, poor sleep and feelings of hopelessness and helplessness. He reports that he continues to struggle with managing his substance use and is still very disappointed that he was required to leave a residential program last month. He has been to the emergency department many times regarding suicidal thoughts and reports that he had plans to either shoot himself or overdose as his life is not worth living.     Laly Rupa denies prior symptoms of lacey including increased goal-directed activity with decreased need for sleep rapid speech or racing thoughts outside of the use of drugs. He denies auditory or visual hallucinations or concerns that people are watching or talking about him. He denies believing he has magical bay or ever receives messages from the television or social media. He denies experiencing panic attacks but he does endorse symptoms of anxiety reporting that he worries excessively, leading to restlessness, muscle fatigue and tension. He denies intrusive or persistent thoughts that are relieved by repetitive behaviors.     Patient denies phobia, fear of abandonment, decreased self-esteem or utilizing self damaging behaviors as coping mechanisms.   He denies aggression or violence or forensic history and reports that he \"just keeps to himself \". He reports that he continues to live with his brother Jose Miguel Gudino he considers a support system and confirms that he may return to their home once his symptoms are stabilized and he is able to contract for safety in the community. Currently he continues to endorse suicidal ideation and is appreciative of the safety of the milieu.   He currently contracts for safety on this unit and is encouraged to attend group programming and other milieu opportunities as well as reach out to support staff as required one-to-one time.         PAST MEDICAL/PSYCHIATRIC HISTORY:   Past Medical History:   Diagnosis Date    Anxiety     Arthritis     Asthma     Bipolar I disorder, most recent episode (or current) depressed, unspecified 9/12/2014    Clostridium difficile infection     COPD (chronic obstructive pulmonary disease) ([address]     Depression     Disease of blood and blood forming organ     Eczema     Fracture, metacarpal     R 4th and 5th    Gastric ulcer     Gastritis 06/13/2018    GERD (gastroesophageal reflux disease)     GI bleed     H. 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[address]     Substance abuse ([address]     Hx of opiates, benzos, cocaine, alcohol abuse    Suicidal ideation     Suicide attempt by hanging ([address]     Tobacco dependence     Ulcerative colitis ([address]     UTI (urinary tract infection)        FAMILY/SOCIAL HISTORY:  Family History   Problem Relation Age of Onset    Diabetes Father     Alcohol Abuse Father     Depression Father     Arthritis Father     High Blood Pressure Father     Other Father         aneurysm & epilepsy  Migraines Father     Arthritis Mother     Other Mother         aneurysm & epilepsy    Migraines Mother     Diabetes Brother         Aunt and uncles    Depression Brother     Mental Illness Brother     Other Brother         epilepsy    Migraines Brother     Stroke Other         Uncle    Other Brother         murdered Oct 6th, 2014    Colon Cancer Paternal Cousin 43    Other Sister         epilepsy    Migraines Sister      Social History     Socioeconomic History    Marital status: Single     Spouse name: Not on file    Number of children: Not on file    Years of education: Not on file    Highest education level: Not on file   Occupational History    Occupation: disability   Tobacco Use    Smoking status: Current Every Day Smoker     Packs/day: 0.50     Years: 15.00     Pack years: 7.50     Types: Cigarettes    Smokeless tobacco: Never Used   Vaping Use    Vaping Use: Former   Substance and Sexual Activity    Alcohol use: Yes     Comment: refused to answer how much but states he has been partaking more since his discharge from tx    Drug use: Yes     Types: Opiates , Cocaine     Comment: Hx of opiates, benzos, cocaine, alcohol abuse    Sexual activity: Yes     Partners: Female     Comment: Lives alone, not working. Other Topics Concern    Not on file   Social History Narrative    ** Merged History Encounter **          Social Determinants of Health     Financial Resource Strain: Medium Risk    Difficulty of Paying Living Expenses: Somewhat hard   Food Insecurity: No Food Insecurity    Worried About Running Out of Food in the Last Year: Never true    Micaela of Food in the Last Year: Never true   Transportation Needs: No Transportation Needs    Lack of Transportation (Medical): No    Lack of Transportation (Non-Medical):  No   Physical Activity: Inactive    Days of Exercise per Week: 0 days    Minutes of Exercise per Session: 0 min   Stress: Stress Concern Present    Feeling of Stress : Rather much   Social Connections: Socially Isolated    Frequency of Communication with Friends and Family: Never    Frequency of Social Gatherings with Friends and Family: Never    Attends Hinduism Services: Never    Active Member of Clubs or Organizations: No    Attends Club or Organization Meetings: Never    Marital Status: Never    Intimate Partner Violence: Not At Risk    Fear of Current or Ex-Partner: No    Emotionally Abused: No    Physically Abused: No    Sexually Abused: No   Housing Stability: [address] Unable to Pay for Housing in the Last Year: No    Number of [place] in the Last Year: 1    Unstable Housing in the Last Year: No       MEDICATIONS:    Current Facility-Administered Medications:     cloNIDine (CATAPRES) tablet 0.1 mg, 0.1 mg, Oral, TID, Meredith Pouch, APRN - CNP, 0.1 mg at 11/22/21 0821    gabapentin (NEURONTIN) tablet 600 mg, 600 mg, Oral, 4x Daily, Meredith Pouch, APRN - CNP, 600 mg at 11/22/21 4197    metFORMIN (GLUCOPHAGE) tablet 500 mg, 500 mg, Oral, BID WC, Meredith Pouch, APRN - CNP, 500 mg at 11/22/21 0821    pantoprazole (PROTONIX) tablet 40 mg, 40 mg, Oral, QAM AC, Meredith Pouch, APRN - CNP, 40 mg at 11/21/21 0831    QUEtiapine (SEROQUEL) tablet 100 mg, 100 mg, Oral, Nightly, Meredith Pouch, APRN - CNP, 100 mg at 11/21/21 2050    sucralfate (CARAFATE) tablet 1 g, 1 g, Oral, 4x Daily, Meredith Pouch, APRN - CNP, 1 g at 11/22/21 7042    chlorproMAZINE (THORAZINE) tablet 50 mg, 50 mg, Oral, BID PRN, Larissa Howell MD    buprenorphine-naloxone (SUBOXONE) 2-0.5 MG SL film 1 Film, 1 Film, SubLINGual, Daily, Larissa Howell MD, 1 Film at 11/22/21 9161    acetaminophen (TYLENOL) tablet 650 mg, 650 mg, Oral, Q4H PRN, Larissa Howell MD    aluminum & magnesium hydroxide-simethicone (MAALOX) 200-200-20 MG/5ML suspension 30 mL, 30 mL, Oral, Q6H PRN, Larissa Howell MD    traZODone (DESYREL) tablet 50 mg, 50 mg, Oral, Nightly PRN, Cooper Zacarias Tommie Terry MD, 50 mg at 11/21/21 2046    polyethylene glycol (GLYCOLAX) packet 17 g, 17 g, Oral, Daily PRN, Cassandra Louise MD    influenza quadrivalent split vaccine (FLUZONE;FLUARIX;FLULAVAL;AFLURIA) injection 0.5 mL, 0.5 mL, IntraMUSCular, Prior to discharge, Cassandra Louise MD  Francisco Javier Isidro  nicotine polacrilex (NICORETTE) gum 4 mg, 4 mg, Oral, Q2H PRN, Cassandra Louise MD, 4 mg at 11/22/21 0825    Examination:  /65   Pulse 71   Temp 97.6 °F (36.4 °C) (Oral)   Resp 14   Ht 5' 8\" (1.727 m)   Wt 223 lb (101.2 kg)   SpO2 98%   BMI 33.91 kg/m²   Gait - steady    HOSPITAL COURSE[de-identified]  Following admission to the hospital, patient had a complete physical exam and blood work up, which was unremarkable. Patient was monitored closely with suicide precaution  Patient was started on Seroquel as noted above. The patient showed medication seeking behavior. He was interested in getting Benadryl or Compazine. In the past the patient has wanted injections of Phenergan. Was encouraged to participate in group and other milieu activity  Patient started to feel better with this combination of treatment. Significant progress in the symptoms since admission. Mood is improved  The patient denies AVH or paranoid thoughts  The patient denies any hopelessness or worthlessness  No active SI/HI  Appetite:  [x] Normal  [] Increased  [] Decreased    Sleep:       [x] Normal  [] Fair       [] Poor            Energy:    [x] Normal  [] Increased  [] Decreased     SI [] Present  [x] Absent  HI  []Present  [x] Absent   Aggression:  [] yes  [] no  Patient is [x] able  [] unable to CONTRACT FOR SAFETY   Medication side effects(SE):  [x] None(Psych.  Meds.) [] Other      Mental Status Examination on discharge:    Level of consciousness:  within normal limits   Appearance:  well-appearing  Behavior/Motor:  no abnormalities noted  Attitude toward examiner:  attentive and good eye contact  Speech:  spontaneous, normal rate and normal volume   Mood: anxious  Affect:  mood congruent  Thought processes:  linear, goal directed and coherent   Thought content:  Suicidal Ideation:  denies suicidal ideation  Delusions:  no evidence of delusions  Perceptual Disturbance:  denies any perceptual disturbance  Cognition:  oriented to person, place, and time   Concentration intact  Memory intact  Insight good   Judgement fair   Fund of Knowledge adequate      ASSESSMENT:  Patient symptoms are:  [x] Well controlled  [x] Improving  [] Worsening  [] No change      Diagnosis:  Principal Problem:    Severe recurrent major depression without psychotic features ([facility]  Active Problems:    Depression with suicidal ideation  Resolved Problems:    * No resolved hospital problems. *      LABS:    No results for input(s): WBC, HGB, PLT in the last 72 hours. No results for input(s): NA, K, CL, CO2, BUN, CREATININE, GLUCOSE in the last 72 hours. No results for input(s): BILITOT, ALKPHOS, AST, ALT in the last 72 hours. Lab Results   Component Value Date    LABAMPH NEG 11/18/2021    BARBSCNU NEGATIVE 10/30/2021    LABBENZ NEG 11/18/2021    LABBENZ NEGATIVE 06/12/2013    LABMETH NEG 11/18/2021    OPIATESCREENURINE NEG 11/18/2021    PHENCYCLIDINESCREENURINE NEG 11/18/2021    PPXUR NOT REPORTED 10/30/2021     Lab Results   Component Value Date    TSH 0.59 03/28/2018     No results found for: LITHIUM  No results found for: VALPROATE, CBMZ    RISK ASSESSMENT AT DISCHARGE: Low risk for suicide and homicide. Treatment Plan:  Reviewed current Medications with the patient. Education provided on the complaince with treatment. Risks, benefits, side effects, drug-to-drug interactions and alternatives to treatment were discussed. Encourage patient to attend outpatient follow up appointment and therapy. Patient was advised to call the outpatient provider, visit the nearest ED or call 911 if symptoms are not manageable.            Medication List      START taking these medications chlorproMAZINE 50 MG tablet  Commonly known as: THORAZINE  Take 1 tablet by mouth 2 times daily as needed (anxiety)        CONTINUE taking these medications    ascorbic acid 500 MG tablet  Commonly known as: VITAMIN C  Take 1 tablet by mouth 2 times daily for 7 days     cloNIDine 0.1 MG tablet  Commonly known as: CATAPRES  Take 1 tablet by mouth 3 times daily     gabapentin 600 MG tablet  Commonly known as: Neurontin  Take 1 tablet by mouth 4 times daily for 30 days. metFORMIN 500 MG tablet  Commonly known as: GLUCOPHAGE  Take 1 tablet by mouth 2 times daily (with meals)     pantoprazole 40 MG tablet  Commonly known as: PROTONIX  Take 1 tablet by mouth every morning (before breakfast)  Start taking on: November 23, 2021     QUEtiapine 100 MG tablet  Commonly known as: SEROQUEL  Take 1 tablet by mouth nightly     sucralfate 1 GM tablet  Commonly known as: Carafate  Take 1 tablet by mouth 4 times daily     traZODone 50 MG tablet  Commonly known as: DESYREL  Take 1 tablet by mouth nightly as needed for Sleep        STOP taking these medications    Belsomra 20 MG Tabs  Generic drug: Suvorexant     buprenorphine-naloxone 12-3 MG sublingual film  Commonly known as: SUBOXONE     hydrOXYzine 50 MG tablet  Commonly known as: ATARAX     meloxicam 7.5 MG tablet  Commonly known as: Mobic     promethazine 25 MG tablet  Commonly known as: PHENERGAN     therapeutic multivitamin-minerals tablet     tiZANidine 4 MG tablet  Commonly known as: Zanaflex     zinc sulfate 220 (50 Zn) MG capsule  Commonly known as: ZINCATE           Where to Get Your Medications      These medications were sent to [address]  Matt Serrano [phone] Kim Kaur [phone]  [facility] [address]    Phone: [phone]   · chlorproMAZINE 50 MG tablet  · cloNIDine 0.1 MG tablet  · metFORMIN 500 MG tablet  · pantoprazole 40 MG tablet  · QUEtiapine 100 MG tablet  · sucralfate 1 GM tablet Core Measures statement:   Not applicable      TIME SPENT - 35 MINUTES TO COMPLETE THE EVALUATION, DISCHARGE SUMMARY, MEDICATION RECONCILIATION AND FOLLOW UP CARE                                         Kayley Mclaughlin is a 32 y.o. male being evaluated Daniel Rees MD on 11/22/2021 at 11:01 AM    An electronic signature was used to authenticate this note. **This report has been created using voice recognition software. It may contain minor errors which are inherent in voice recognition technology. **

## 2021-11-22 NOTE — PLAN OF CARE
Problem: Depressive Behavior With or Without Suicide Precautions:  Goal: Ability to disclose and discuss suicidal ideas will improve  Description: Ability to disclose and discuss suicidal ideas will improve  11/22/2021 1114 by Leah Rosales RN  Outcome: Ongoing     Problem: Depressive Behavior With or Without Suicide Precautions:  Goal: Absence of self-harm  Description: Absence of self-harm  11/22/2021 1114 by Leah Rosales RN  Outcome: Ongoing  Patient was accepting of shift assessment. Patient stated that he slept well last night. Patient has appetite and continues to eat majority of meals. Patient denies suicidal and homicidal ideation through this shift. Patient was accepting of shift assessment. Patient stated that he slept well last night. Patient has appetite and continues to eat majority of meals. Patient denies suicidal and homicidal ideation through this shift.

## 2021-11-22 NOTE — SUICIDE SAFETY PLAN
[phone] or [phone]  YUE (National Association of Mental Illness) groups and support, [address] [phone]    4. [address] [place] [address] Emergency Services -  for example, Community Mental    \  [address], [address] [place] [facility] of [facility]-ER, Crisis line: [address]-YCYR Crisis Response Team (Crisis Intervention Team - [place]), [phone] or 9-1-1  [facility], [address] Association of Mental Illness, [phone]  [facility] Substance Abuse [address] [facility], 2-519-922-HELP (4147)   Crisis Text Line, Text 4HOPE to 611602 to connect with a crisis counselor  [address] [facility], [phone]  [address] ([place], [address]), [phone]  [facility], [address] [facility] [address] [place], [address]  [address], [address] [facility] [place], [facility] [phone]  [facility], [address] [place] [address], [place], [address] [facility] [address] [place] [address], [address] [place] [address] do [place], [address] [facility] [address], [phone]    Making the environment safe: How can I make my environment (house/apartment/living space) safer? For example, can I remove guns, medications, and other items? 1. Throw away all medications not being taken  2.  Plan daily goals to help remember to stay on specific medications

## 2021-11-23 ENCOUNTER — TELEPHONE (OUTPATIENT)
Dept: INTERNAL MEDICINE CLINIC | Age: 31
End: 2021-11-23

## 2021-11-23 NOTE — TELEPHONE ENCOUNTER
Received a call today from [facility] in [facility] stating patient is trying to get Suboxone 3 day script from 11/4 filled today.  Pharmacist wanting to know if she should fill script or not, per [employer] [employer] do not fill script, pharmacist informed

## 2021-11-24 ENCOUNTER — HOSPITAL ENCOUNTER (EMERGENCY)
Age: 31
Discharge: HOME | End: 2021-11-24
Attending: EMERGENCY MEDICINE
Payer: MEDICARE

## 2021-11-24 ENCOUNTER — APPOINTMENT (OUTPATIENT)
Dept: GENERAL RADIOLOGY | Age: 31
End: 2021-11-24
Payer: MEDICARE

## 2021-11-24 ENCOUNTER — CARE COORDINATION (OUTPATIENT)
Dept: CARE COORDINATION | Age: 31
End: 2021-11-24

## 2021-11-24 VITALS
RESPIRATION RATE: 17 BRPM | HEART RATE: 77 BPM | TEMPERATURE: 97.2 F | WEIGHT: 250 LBS | BODY MASS INDEX: 37.89 KG/M2 | OXYGEN SATURATION: 97 % | SYSTOLIC BLOOD PRESSURE: 117 MMHG | HEIGHT: 68 IN | DIASTOLIC BLOOD PRESSURE: 72 MMHG

## 2021-11-24 DIAGNOSIS — R07.9 CHEST PAIN, UNSPECIFIED TYPE: Primary | ICD-10-CM

## 2021-11-24 PROCEDURE — 71045 X-RAY EXAM CHEST 1 VIEW: CPT

## 2021-11-24 PROCEDURE — 99284 EMERGENCY DEPT VISIT MOD MDM: CPT

## 2021-11-24 PROCEDURE — 93005 ELECTROCARDIOGRAM TRACING: CPT | Performed by: EMERGENCY MEDICINE

## 2021-11-24 ASSESSMENT — PAIN SCALES - GENERAL: PAINLEVEL_OUTOF10: 6

## 2021-11-24 ASSESSMENT — ENCOUNTER SYMPTOMS
VOMITING: 0
SPUTUM PRODUCTION: 1
SHORTNESS OF BREATH: 0
NAUSEA: 0
BACK PAIN: 0
DIARRHEA: 0
WHEEZING: 0
ABDOMINAL PAIN: 1
COUGH: 1
ORTHOPNEA: 0

## 2021-11-24 ASSESSMENT — PAIN DESCRIPTION - LOCATION: LOCATION: ABDOMEN;HEAD

## 2021-11-24 ASSESSMENT — PAIN DESCRIPTION - DESCRIPTORS: DESCRIPTORS: ACHING;PRESSURE

## 2021-11-24 NOTE — ED PROVIDER NOTES
[address] [facility]  ED  EMERGENCY DEPARTMENT ENCOUNTER      Pt Name: Aurora Faye  MRN: [id number]  [place] 1990  Date of evaluation: 11/24/21  PCP:  MASSIEL Gandhi CNP    CHIEF COMPLAINT:   Chief Complaint   Patient presents with    Dizziness    Chest Pain     for a couple days    Abdominal Pain     for a few days    Cough     since yesterday    Hemoptysis     since last night     HISTORY OF PRESENT ILLNESS   Aurora Faye is a 32 y.o. male whopresents with chest pain and cough. He has been seen numerous times in the [facility] system in the past month he has been seen 9 times at [address] [place] [address] system was added serial lab work EKGs and chest x-ray basically and everything is negative he complains of chest discomfort and a cough he describes as a productive of some phlegm with some streaks of blood in it. [place] fevers or chills. Is no shortness of breath he has abdominal pain but it is chronic and unchanged from his chronic daily abdominal pain. Denies any pain in his back headache neck pain. REVIEW OF SYSTEMS       Review of Systems   Constitutional: Negative for chills and fever. HENT: Negative for nosebleeds. Respiratory: Positive for cough and sputum production. Negative for shortness of breath and wheezing. Cardiovascular: Positive for chest pain. Negative for palpitations and orthopnea. Gastrointestinal: Positive for abdominal pain. Negative for diarrhea, nausea and vomiting. Genitourinary: Negative for dysuria and urgency. Musculoskeletal: Negative for back pain and neck pain. Skin: Negative for rash. Neurological: Negative for dizziness, loss of consciousness and headaches. 10 essential systems negative except as stated above and in the HPI.       PAST MEDICAL HISTORY   PMH:  has a past medical history of Anxiety, Arthritis, Asthma, Bipolar I disorder, most recent episode (or current) depressed, unspecified, Clostridium difficile infection, COPD (chronic obstructive pulmonary disease) ([facility] [address] Depression, Disease of blood and blood forming organ, Eczema, Fracture, metacarpal, Gastric ulcer, Gastritis, GERD (gastroesophageal reflux disease), GI bleed, H. pylori infection, H/O blood clots, Head injury, Headache, Insomnia, Juvenile rheumatoid arthritis ([facility] [address] Neuromuscular disorder (HCC), PFAPA syndrome ([facility] [address] PUD (peptic ulcer disease), Rheumatoid arthritis ([facility] [address] Rheumatoid arthritis(714.0), Severe recurrent major depression without psychotic features ([facility] [address] Sleep apnea, 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[facility] [address] Substance abuse ([facility] [address] Suicidal ideation, Suicide attempt by hanging ([facility] [address] Tobacco dependence, Ulcerative colitis ([facility] [address] and UTI (urinary tract infection). SurgicalHistory:  has a past surgical history that includes Colonoscopy; bronchoscopy; other surgical history; Upper gastrointestinal endoscopy (2/4/16); pr egd transoral biopsy single/multiple (N/A, 3/20/2017); sigmoidoscopy (N/A, 3/20/2017); Cholecystectomy, laparoscopic (07/14/2017); pr esophagogastroduodenoscopy transoral diagnostic (N/A, 8/9/2017); pr colonoscopy w/biopsy single/multiple (8/9/2017); Abdomen surgery; Upper gastrointestinal endoscopy (N/A, 6/13/2018); Upper gastrointestinal endoscopy (N/A, 9/20/2018); and Endoscopy, colon, diagnostic. Social History:  reports that he has been smoking cigarettes. He has a 7.50 pack-year smoking history. He has never used smokeless tobacco. He reports current alcohol use. He reports current drug use. Drugs: Opiates  and Cocaine. Family History: Noncontributory at this time  Psychiatric History: Noncontributory at this time    Allergies:is allergic to dicyclomine, famotidine, geodon [ziprasidone hcl], haloperidol, iv dye [iodides], olanzapine, reglan [metoclopramide], ibuprofen, aspirin, dicyclomine hcl, hydroxyzine hcl, iodine, metronidazole, mirtazapine, and ziprasidone.       PHYSICAL EXAM     INITIAL VITALS: /72   Pulse 77   Temp 97.2 °F (36.2 °C) AM EST    I reviewed work-up from his 9 visits to the [address] [place] [address] emergency department which are unremarkable I feel no need to repeat laboratory work-up. He is been sleeping in no acute distress I feel stable for discharge at this point. FINAL IMPRESSION:     1. Chest pain, unspecified type          DISPOSITION:  DISPOSITION Decision To Discharge 11/24/2021 08:27:52 AM        PATIENT REFERRED TO:  MASSIEL Ramirez CNP  [place] [id number] [phone]      Call for follow up appointment    [facility]  [address] [place] [address]  [phone]    If symptoms worsen      DISCHARGEMEDICATIONS:  New Prescriptions    No medications on file       (Please note that portions of this note were completed with a voice recognition program.  Efforts were made to edit thedictations but occasionally words are mis-transcribed. )    Jeanie Roberts MD Attending Emergency Medicine Physician          Lux Sun MD  11/24/21 7946

## 2021-11-24 NOTE — ED TRIAGE NOTES
Pt arrived to ED 28 via triage. Pt co headache, abdominal pain and chest pain. Pain is a 6/10 and the chest pain is non radiating. Pt stated that he has been vomiting and coughing up \"black stuff' since last night. Pt is resting on stretcher with call light within reach. Pt is in no distress and breathing is non labored. Will continue to monitor.

## 2021-11-26 PROCEDURE — 93010 ELECTROCARDIOGRAM REPORT: CPT | Performed by: INTERNAL MEDICINE

## 2021-11-27 LAB
EKG ATRIAL RATE: 73 BPM
EKG P AXIS: 79 DEGREES
EKG P-R INTERVAL: 144 MS
EKG Q-T INTERVAL: 380 MS
EKG QRS DURATION: 88 MS
EKG QTC CALCULATION (BAZETT): 418 MS
EKG R AXIS: 62 DEGREES
EKG T AXIS: 49 DEGREES
EKG VENTRICULAR RATE: 73 BPM

## 2021-11-30 ENCOUNTER — CARE COORDINATION (OUTPATIENT)
Dept: CARE COORDINATION | Age: 31
End: 2021-11-30

## 2021-11-30 ENCOUNTER — TELEPHONE (OUTPATIENT)
Dept: INTERNAL MEDICINE CLINIC | Age: 31
End: 2021-11-30

## 2021-11-30 NOTE — TELEPHONE ENCOUNTER
Patrick Huang from the [facility] called in regards to the patient stating he is possibly doctor shopping due the overlapping in his gabapentin and suboxone.

## 2021-12-02 ENCOUNTER — VIRTUAL VISIT (OUTPATIENT)
Dept: INTERNAL MEDICINE CLINIC | Age: 31
End: 2021-12-02
Payer: MEDICARE

## 2021-12-02 ENCOUNTER — CARE COORDINATION (OUTPATIENT)
Dept: CARE COORDINATION | Age: 31
End: 2021-12-02

## 2021-12-02 DIAGNOSIS — R11.0 NAUSEA: ICD-10-CM

## 2021-12-02 DIAGNOSIS — F11.20 SEVERE OPIOID USE DISORDER (HCC): Primary | ICD-10-CM

## 2021-12-02 DIAGNOSIS — F25.0 SCHIZOAFFECTIVE DISORDER, BIPOLAR TYPE (HCC): ICD-10-CM

## 2021-12-02 PROCEDURE — G8428 CUR MEDS NOT DOCUMENT: HCPCS | Performed by: NURSE PRACTITIONER

## 2021-12-02 PROCEDURE — 99214 OFFICE O/P EST MOD 30 MIN: CPT | Performed by: NURSE PRACTITIONER

## 2021-12-02 PROCEDURE — 1111F DSCHRG MED/CURRENT MED MERGE: CPT | Performed by: NURSE PRACTITIONER

## 2021-12-02 RX ORDER — BUPRENORPHINE AND NALOXONE 12; 3 MG/1; MG/1
1 FILM, SOLUBLE BUCCAL; SUBLINGUAL DAILY
Qty: 25 FILM | Refills: 0 | Status: SHIPPED | OUTPATIENT
Start: 2021-12-02 | End: 2021-12-27

## 2021-12-02 RX ORDER — PROMETHAZINE HYDROCHLORIDE 12.5 MG/1
12.5 TABLET ORAL 3 TIMES DAILY PRN
Qty: 30 TABLET | Refills: 0 | Status: SHIPPED | OUTPATIENT
Start: 2021-12-02 | End: 2021-12-09

## 2021-12-02 RX ORDER — CLONIDINE HYDROCHLORIDE 0.1 MG/1
0.1 TABLET ORAL 3 TIMES DAILY
Qty: 90 TABLET | Refills: 0 | Status: SHIPPED | OUTPATIENT
Start: 2021-12-02 | End: 2021-12-27 | Stop reason: SDUPTHER

## 2021-12-02 NOTE — PROGRESS NOTES
Kimberley Angelucci (:  1990) is a 32 y.o. male,Established patient, here for evaluation of the following chief complaint(s): Drug Problem         ASSESSMENT/PLAN:    1. Severe opioid use disorder (HCC)  -     buprenorphine-naloxone (SUBOXONE) 12-3 MG sublingual film; Place 1 Film under the tongue daily for 25 days. , Disp-25 Film, R-0Normal  - Narcan at home  - OARRS reviewed, no discrepancies  - Attend NA/AA meetings and/or arrange for individualized counseling     2. Schizoaffective disorder, bipolar type (HCC)  -     cloNIDine (CATAPRES) 0.1 MG tablet; Take 1 tablet by mouth 3 times daily, Disp-90 tablet, R-0Normal    3. Nausea  -     promethazine (PHENERGAN) 12.5 MG tablet; Take 1 tablet by mouth 3 times daily as needed for Nausea, Disp-30 tablet, R-0Normal      Return in about 25 days (around 2021), or at 2:00 pm.       SUBJECTIVE/OBJECTIVE:    I last seen Anny Sweet 4 weeks ago    Patient location: Home  Provider location: Office  Others present/name/role: none    He has been staying in [facility] at his brother's house. Refuses to switch care to a closer provider. Discussed at length with patient medication refills from several providers. Call received from the board of pharmacy that there is concern that he may be doctor shopping. Anny Sweet has been homeless, in and out of several rehabilitation facilities. It appears this is where the abundance of prescriptions have come from. When reviewing OARRS there is no evidence of overprescribing however. Explained that if he has controlled medications prescribed by another provider again, I will no longer prescribe. Understanding voiced. He denies any use    Is not active in counseling currently    Urges and cravings controlled with Suboxone 12 mg daily    Mental health concerns stable, needs refill on clonidine    Nausea controlled with PRN phenergan.  He has had several ER visits for nausea and abdominal pain, however does not stay in one place long enough to follow with GI routinely. Review of Systems   Cardiovascular: Negative for palpitations. Neurological: Positive for headaches. Negative for dizziness. Psychiatric/Behavioral: Negative for dysphoric mood and suicidal ideas. All other systems reviewed and are negative. No flowsheet data found. Physical Exam    [INSTRUCTIONS:  \"[x]\" Indicates a positive item  \"[]\" Indicates a negative item  -- DELETE ALL ITEMS NOT EXAMINED]    Constitutional: [x] Appears well-developed and well-nourished [x] No apparent distress      [] Abnormal -     Mental status: [x] Alert and awake  [x] Oriented to person/place/time [x] Able to follow commands    [] Abnormal -     Eyes:   EOM    [x]  Normal    [] Abnormal -   Sclera  [x]  Normal    [] Abnormal -          Discharge [x]  None visible   [] Abnormal -     HENT: [x] Normocephalic, atraumatic  [] Abnormal -   [x] Mouth/Throat: Mucous membranes are moist    External Ears [x] Normal  [] Abnormal -    Neck: [x] No visualized mass [] Abnormal -     Pulmonary/Chest: [x] Respiratory effort normal   [x] No visualized signs of difficulty breathing or respiratory distress        [] Abnormal -      Musculoskeletal:   [x] Normal gait with no signs of ataxia         [x] Normal range of motion of neck        [] Abnormal -     Neurological:        [x] No Facial Asymmetry (Cranial nerve 7 motor function) (limited exam due to video visit)          [x] No gaze palsy        [] Abnormal -          Skin:        [x] No significant exanthematous lesions or discoloration noted on facial skin         [] Abnormal -            Psychiatric:       [x] Normal Affect [] Abnormal -        [x] No Hallucinations    Other pertinent observable physical exam findings:- none      Nesha Linn, was evaluated through a synchronous (real-time) audio-video encounter. The patient (or guardian if applicable) is aware that this is a billable service.  Verbal consent to proceed has been obtained within the past 12 months. The visit was conducted pursuant to the emergency declaration under the [facility], [address] [place] [address] authority and the [employer] Act. Patient identification was verified, and a caregiver was present when appropriate. The patient was located in a state where the provider was credentialed to provide care. An electronic signature was used to authenticate this note.     --Julio César Gustafson, APRN - CNP

## 2021-12-08 ENCOUNTER — CLINICAL DOCUMENTATION (OUTPATIENT)
Dept: INTERNAL MEDICINE CLINIC | Age: 31
End: 2021-12-08

## 2021-12-10 ENCOUNTER — CARE COORDINATION (OUTPATIENT)
Dept: CARE COORDINATION | Age: 31
End: 2021-12-10

## 2021-12-17 ENCOUNTER — CARE COORDINATION (OUTPATIENT)
Dept: CARE COORDINATION | Age: 31
End: 2021-12-17

## 2021-12-19 ENCOUNTER — APPOINTMENT (OUTPATIENT)
Dept: GENERAL RADIOLOGY | Age: 31
End: 2021-12-19
Payer: MEDICARE

## 2021-12-19 ENCOUNTER — HOSPITAL ENCOUNTER (EMERGENCY)
Age: 31
Discharge: LEFT BEFORE DISCHARGE | End: 2021-12-19
Attending: EMERGENCY MEDICINE
Payer: MEDICARE

## 2021-12-19 VITALS
OXYGEN SATURATION: 94 % | RESPIRATION RATE: 18 BRPM | TEMPERATURE: 99.3 F | WEIGHT: 240 LBS | HEIGHT: 68 IN | HEART RATE: 109 BPM | BODY MASS INDEX: 36.37 KG/M2

## 2021-12-19 DIAGNOSIS — Z53.21 ELOPED FROM EMERGENCY DEPARTMENT: Primary | ICD-10-CM

## 2021-12-19 PROCEDURE — 99285 EMERGENCY DEPT VISIT HI MDM: CPT

## 2021-12-19 ASSESSMENT — ENCOUNTER SYMPTOMS
SHORTNESS OF BREATH: 0
BLOOD IN STOOL: 1
VOMITING: 1
RHINORRHEA: 0
COUGH: 0
NAUSEA: 1
ABDOMINAL PAIN: 1

## 2021-12-19 ASSESSMENT — PAIN SCALES - GENERAL: PAINLEVEL_OUTOF10: 7

## 2021-12-19 ASSESSMENT — PAIN DESCRIPTION - LOCATION: LOCATION: ABDOMEN

## 2021-12-19 NOTE — ED NOTES
Pt ambulated to bathroom in waiting room. RN was notified that pt was seen leaving the ED shortly afterwards.      Lalit Marino RN  12/19/21 0300

## 2021-12-19 NOTE — ED PROVIDER NOTES
[facility] ED  Emergency Department Encounter  Emergency Medicine Resident     Pt Name: Johana Fernandez  MRN: [id number]  [place] 1990  Date of evaluation: 12/19/21  PCP:  MASSIEL Urena CNP    This patient was evaluated in the Emergency Department for symptoms described in the history of present illness. The patient was evaluated in the context of the global COVID-19 pandemic, which necessitated consideration that the patient might be at risk for infection with the SARS-CoV-2 virus that causes COVID-19. Institutional protocols and algorithms that pertain to the evaluation of patients at risk for COVID-19 are in a state of rapid change based on information released by regulatory bodies including the CDC and federal and state organizations. These policies and algorithms were followed during the patient's care in the ED. CHIEF COMPLAINT       Chief Complaint   Patient presents with    Abdominal Pain     patient stated no chest pain    Emesis     HISTORY OFPRESENT ILLNESS  (Location/Symptom, Timing/Onset, Context/Setting, Quality, Duration, Modifying Factors,Severity.)      Johana Fernandez is a 32 y.o. male who presents with diffuse abdominal pain which started 2 to 3 days ago. Patient states that he is also been having emesis with dark colored vomitus occasionally bright red. Patient states that he last vomited 30 minutes prior to arrival to the emergency department. Patient states that he is also been having some bright red blood per rectum. Patient denies any fevers, headaches, vision changes, chest pain, shortness of breath, cough, congestion, runny nose. He denies any prior episodes of similar symptoms. He states that he has been taking ibuprofen and Tylenol for pain. He states that he will take 2 to 3 tablets at a time and does this 1-2 times a day. He denies any ETOH use or recreational drug use.      PAST MEDICAL / SURGICAL / SOCIAL / FAMILY HISTORY      has a past medical history of Anxiety, Arthritis, Asthma, Bipolar I disorder, most recent episode (or current) depressed, unspecified, Clostridium difficile infection, COPD (chronic obstructive pulmonary disease) ([address] Depression, Disease of blood and blood forming organ, Eczema, Fracture, metacarpal, Gastric ulcer, Gastritis, GERD (gastroesophageal reflux disease), GI bleed, H. 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 has a past surgical history that includes Colonoscopy; bronchoscopy; other surgical history; Upper gastrointestinal endoscopy (2/4/16); pr egd transoral biopsy single/multiple (N/A, 3/20/2017); sigmoidoscopy (N/A, 3/20/2017); Cholecystectomy, laparoscopic (07/14/2017); pr esophagogastroduodenoscopy transoral diagnostic (N/A, 8/9/2017); pr colonoscopy w/biopsy single/multiple (8/9/2017); Abdomen surgery; Upper gastrointestinal endoscopy (N/A, 6/13/2018); Upper gastrointestinal endoscopy (N/A, 9/20/2018); and Endoscopy, colon, diagnostic. Social History     Socioeconomic History    Marital status: Single     Spouse name: Not on file    Number of children: Not on file    Years of education: Not on file    Highest education level: Not on file   Occupational History    Occupation: disability   Tobacco Use    Smoking status: Current Every Day Smoker     Packs/day: 0.50     Years: 15.00     Pack years: 7.50     Types: Cigarettes    Smokeless tobacco: Never Used   Vaping Use    Vaping Use: Former   Substance and Sexual Activity    Alcohol use:  Yes Comment: refused to answer how much but states he has been partaking more since his discharge from tx    Drug use: Yes     Types: Opiates , Cocaine     Comment: Hx of opiates, benzos, cocaine, alcohol abuse    Sexual activity: Yes     Partners: Female     Comment: Lives alone, not working. Other Topics Concern    Not on file   Social History Narrative    ** Merged History Encounter **          Social Determinants of Health     Financial Resource Strain: Medium Risk    Difficulty of Paying Living Expenses: Somewhat hard   Food Insecurity: No Food Insecurity    Worried About Running Out of Food in the Last Year: Never true    [place] of Food in the Last Year: Never true   Transportation Needs: No Transportation Needs    Lack of Transportation (Medical): No    Lack of Transportation (Non-Medical):  No   Physical Activity: Inactive    Days of Exercise per Week: 0 days    Minutes of Exercise per Session: 0 min   Stress: Stress Concern Present    Feeling of Stress : Rather much   Social Connections: Socially Isolated    Frequency of Communication with Friends and Family: Never    Frequency of Social Gatherings with Friends and Family: Never    Attends Faith Services: Never    Active Member of Clubs or Organizations: No    Attends Club or Organization Meetings: Never    Marital Status: Never    Intimate Partner Violence: Not At Risk    Fear of Current or Ex-Partner: No    Emotionally Abused: No    Physically Abused: No    Sexually Abused: No   Housing Stability: Low Risk     Unable to Pay for Housing in the Last Year: No    Number of [place] in the Last Year: 1    Unstable Housing in the Last Year: No       Family History   Problem Relation Age of Onset    Diabetes Father     Alcohol Abuse Father     Depression Father     Arthritis Father     High Blood Pressure Father     Other Father         aneurysm & epilepsy    Migraines Father     Arthritis Mother    [facility] Other Mother aneurysm & epilepsy    Migraines Mother     Diabetes Brother         Aunt and uncles    Depression Brother     Mental Illness Brother     Other Brother         epilepsy    Migraines Brother     Stroke Other         Uncle    Other Brother         murdered Oct 6th, 2014    Colon Cancer Paternal Cousin 43    Other Sister         epilepsy   [facility] Migraines Sister        Allergies:  Dicyclomine, Famotidine, Geodon [ziprasidone hcl], Haloperidol, Iv dye [iodides], Olanzapine, Reglan [metoclopramide], Ibuprofen, Aspirin, Dicyclomine hcl, Hydroxyzine hcl, Iodine, Metronidazole, Mirtazapine, and Ziprasidone    Home Medications:  Prior to Admission medications    Medication Sig Start Date End Date Taking? Authorizing Provider   buprenorphine-naloxone (SUBOXONE) 12-3 MG sublingual film Place 1 Film under the tongue daily for 25 days. 12/2/21 12/27/21  MASSIEL Jimenez CNP   cloNIDine (CATAPRES) 0.1 MG tablet Take 1 tablet by mouth 3 times daily 12/2/21   MASSIEL Jimenez CNP   metFORMIN (GLUCOPHAGE) 500 MG tablet Take 1 tablet by mouth 2 times daily (with meals) 11/22/21   Dax Kenyon MD   chlorproMAZINE (THORAZINE) 50 MG tablet Take 1 tablet by mouth 2 times daily as needed (anxiety) 11/22/21   Dax Kenyon MD   QUEtiapine (SEROQUEL) 100 MG tablet Take 1 tablet by mouth nightly 11/22/21   Dax Kenyon MD   sucralfate (CARAFATE) 1 GM tablet Take 1 tablet by mouth 4 times daily 11/22/21   Dax Kenyon MD   pantoprazole (PROTONIX) 40 MG tablet Take 1 tablet by mouth every morning (before breakfast) 11/23/21   Dax Kenyon MD   ascorbic acid (VITAMIN C) 500 MG tablet Take 1 tablet by mouth 2 times daily for 7 days 11/19/21 11/26/21  Nery Telles DO   gabapentin (NEURONTIN) 600 MG tablet Take 1 tablet by mouth 4 times daily for 30 days.  11/18/21 12/18/21  MASSIEL Jimenez CNP   traZODone (DESYREL) 50 MG tablet Take 1 tablet by mouth nightly as needed for Sleep 11/1/21 Sarah Pereira MD   meloxicam (MOBIC) 7.5 MG tablet Take 1 tablet by mouth 2 times daily as needed for Pain 8/19/21 10/30/21  MASSIEL Traore - CNP     REVIEW OF SYSTEMS    (2-9 systems for level 4, 10 or more for level 5)      Review of Systems   Constitutional: Negative for activity change. HENT: Negative for congestion and rhinorrhea. Eyes: Negative for visual disturbance. Respiratory: Negative for cough and shortness of breath. Cardiovascular: Negative for chest pain. Gastrointestinal: Positive for abdominal pain, blood in stool, nausea and vomiting. Genitourinary: Negative for dysuria and hematuria. Skin: Negative for wound. Neurological: Positive for headaches. Negative for dizziness. PHYSICAL EXAM   (up to 7 for level 4, 8 or more for level 5)     INITIAL VITALS:    height is 5' 8\" (1.727 m) and weight is 240 lb (108.9 kg). His oral temperature is 99.3 °F (37.4 °C). His pulse is 109. His respiration is 18 and oxygen saturation is 94%. Physical Exam  Constitutional:       General: He is not in acute distress. Appearance: He is not ill-appearing or diaphoretic. HENT:      Head: Normocephalic and atraumatic. Cardiovascular:      Rate and Rhythm: Normal rate and regular rhythm. Heart sounds: Normal heart sounds. No murmur heard. Pulmonary:      Effort: Pulmonary effort is normal. No respiratory distress. Breath sounds: Normal breath sounds. No wheezing. Chest:      Chest wall: No tenderness. Abdominal:      General: Bowel sounds are normal. There is no distension. Palpations: Abdomen is soft. Tenderness: There is generalized abdominal tenderness (epigastric greatest). Skin:     General: Skin is warm and dry. Capillary Refill: Capillary refill takes less than 2 seconds. Neurological:      General: No focal deficit present. Mental Status: He is alert and oriented to person, place, and time.        DIFFERENTIAL  DIAGNOSIS PLAN (LABS / IMAGING / EKG):  Orders Placed This Encounter   Procedures    LIPASE    CBC WITH AUTO DIFFERENTIAL    Basic Metabolic Panel w/ Reflex to MG    HEPATIC FUNCTION PANEL     MEDICATIONS ORDERED:  No orders of the defined types were placed in this encounter. DDX: Peptic ulcer disease, duodenal ulcer, lower GI bleed, acute gastritis, pancreatitis    Initial MDM/Plan: 32 y.o. male who presents with generalized abdominal pain greatest in the epigastric region the last several days patient also endorses dark bloody emesis and bright red blood per rectum. Obtain lab work to assess for pancreatitis, as well as CBC, BMP and LFTs. Consider GI evaluation for possible endoscopy pending results medical treatment. Zofran for nausea    DIAGNOSTIC RESULTS / EMERGENCY DEPARTMENT COURSE / MDM     LABS:  Labs Reviewed   LIPASE   CBC WITH AUTO DIFFERENTIAL   BASIC METABOLIC PANEL W/ REFLEX TO MG FOR LOW K   HEPATIC FUNCTION PANEL       RADIOLOGY:  No results found. EMERGENCY DEPARTMENT COURSE:  ED Course as of 12/19/21 0842   Sun Dec 19, 2021   0300 Informed patient eloped after refusing to get his BP taken. Per nurse, labs were collected, but patient went to the bathroom then left the department. [CP]      ED Course User Index  [CP] Messi Singer MD     PROCEDURES:  None    CONSULTS:  None    CRITICAL CARE:  Please see attending note    FINAL IMPRESSION      1. Eloped from emergency department        DISPOSITION / [address] - Left Before Treatment Complete 12/19/2021 07:47:21 AM  Eloped  Informed by nursing staff that patient went to the bathroom and then left the emergency department after refusing to get his blood pressure taken. PATIENTREFERRED TO:  No follow-up provider specified.     DISCHARGE MEDICATIONS:  Discharge Medication List as of 12/19/2021  3:00 AM          Abhay Link MD  Emergency Medicine Resident    (Please note that portions of this note were completed with a voice recognition program.  Efforts were made to edit the dictations but occasionally words are mis-transcribed.)       Vasiliy Marti MD  Resident  12/19/21 5600

## 2021-12-19 NOTE — ED PROVIDER NOTES
[facility]     Emergency Department     Faculty Attestation    I performed a history and physical examination of the patient and discussed management with the resident. I have reviewed and agree with the residents findings including all diagnostic interpretations, and treatment plans as written. Any areas of disagreement are noted on the chart. I was personally present for the key portions of any procedures. I have documented in the chart those procedures where I was not present during the key portions. I have reviewed the emergency nurses triage note. I agree with the chief complaint, past medical history, past surgical history, allergies, medications, social and family history as documented unless otherwise noted below. Documentation of the HPI, Physical Exam and Medical Decision Making performed by sehtibmargo is based on my personal performance of the HPI, PE and MDM. For Physician Assistant/ Nurse Practitioner cases/documentation I have personally evaluated this patient and have completed at least one if not all key elements of the E/M (history, physical exam, and MDM). Additional findings are as noted. 31 yo M c/o abdominal pain & vomit, no fever, no injury  pe hr normalized, gcs 15, flat affect, no acute distress, abdomen mild diffuse tenderness, no distension, no rigidity, no guard, no mass,     We attempted to obtain BP, & lab, pt refused, pt eloped from ER    EKG Interpretation    Interpreted by me      CRITICAL CARE: There was a high probability of clinically significant/life threatening deterioration in this patient's condition which required my urgent intervention. Total critical care time was 0 minutes. This excludes any time for separately reportable procedures.        [address]  12/19/21 9036

## 2021-12-20 ENCOUNTER — HOSPITAL ENCOUNTER (EMERGENCY)
Age: 31
Discharge: HOME | End: 2021-12-20
Attending: EMERGENCY MEDICINE
Payer: MEDICARE

## 2021-12-20 ENCOUNTER — HOSPITAL ENCOUNTER (EMERGENCY)
Age: 31
Discharge: HOME | End: 2021-12-21
Attending: EMERGENCY MEDICINE
Payer: MEDICARE

## 2021-12-20 ENCOUNTER — HOSPITAL ENCOUNTER (EMERGENCY)
Age: 31
Discharge: LEFT BEFORE DISCHARGE | End: 2021-12-20

## 2021-12-20 VITALS
BODY MASS INDEX: 33.34 KG/M2 | TEMPERATURE: 97.9 F | OXYGEN SATURATION: 97 % | SYSTOLIC BLOOD PRESSURE: 131 MMHG | DIASTOLIC BLOOD PRESSURE: 87 MMHG | HEART RATE: 95 BPM | WEIGHT: 220 LBS | HEIGHT: 68 IN

## 2021-12-20 VITALS
OXYGEN SATURATION: 95 % | WEIGHT: 222.2 LBS | TEMPERATURE: 98.6 F | HEIGHT: 69 IN | RESPIRATION RATE: 16 BRPM | BODY MASS INDEX: 32.91 KG/M2 | SYSTOLIC BLOOD PRESSURE: 152 MMHG | HEART RATE: 108 BPM | DIASTOLIC BLOOD PRESSURE: 80 MMHG

## 2021-12-20 VITALS
RESPIRATION RATE: 16 BRPM | HEIGHT: 69 IN | DIASTOLIC BLOOD PRESSURE: 88 MMHG | TEMPERATURE: 98 F | OXYGEN SATURATION: 95 % | WEIGHT: 220 LBS | BODY MASS INDEX: 32.58 KG/M2 | HEART RATE: 98 BPM | SYSTOLIC BLOOD PRESSURE: 141 MMHG

## 2021-12-20 DIAGNOSIS — R10.84 GENERALIZED ABDOMINAL PAIN: Primary | ICD-10-CM

## 2021-12-20 DIAGNOSIS — G89.29 CHRONIC ABDOMINAL PAIN: ICD-10-CM

## 2021-12-20 DIAGNOSIS — R19.7 DIARRHEA, UNSPECIFIED TYPE: Primary | ICD-10-CM

## 2021-12-20 DIAGNOSIS — F41.9 ANXIETY: Primary | ICD-10-CM

## 2021-12-20 DIAGNOSIS — R10.9 CHRONIC ABDOMINAL PAIN: ICD-10-CM

## 2021-12-20 DIAGNOSIS — K29.00 OTHER ACUTE GASTRITIS WITHOUT HEMORRHAGE: ICD-10-CM

## 2021-12-20 PROCEDURE — 80048 BASIC METABOLIC PNL TOTAL CA: CPT

## 2021-12-20 PROCEDURE — 6370000000 HC RX 637 (ALT 250 FOR IP): Performed by: EMERGENCY MEDICINE

## 2021-12-20 PROCEDURE — 2580000003 HC RX 258: Performed by: STUDENT IN AN ORGANIZED HEALTH CARE EDUCATION/TRAINING PROGRAM

## 2021-12-20 PROCEDURE — 99284 EMERGENCY DEPT VISIT MOD MDM: CPT

## 2021-12-20 PROCEDURE — 99283 EMERGENCY DEPT VISIT LOW MDM: CPT

## 2021-12-20 PROCEDURE — 99282 EMERGENCY DEPT VISIT SF MDM: CPT

## 2021-12-20 PROCEDURE — 96375 TX/PRO/DX INJ NEW DRUG ADDON: CPT

## 2021-12-20 PROCEDURE — 80076 HEPATIC FUNCTION PANEL: CPT

## 2021-12-20 PROCEDURE — 83690 ASSAY OF LIPASE: CPT

## 2021-12-20 PROCEDURE — 6360000002 HC RX W HCPCS: Performed by: STUDENT IN AN ORGANIZED HEALTH CARE EDUCATION/TRAINING PROGRAM

## 2021-12-20 PROCEDURE — 96372 THER/PROPH/DIAG INJ SC/IM: CPT

## 2021-12-20 PROCEDURE — 85025 COMPLETE CBC W/AUTO DIFF WBC: CPT

## 2021-12-20 PROCEDURE — 96374 THER/PROPH/DIAG INJ IV PUSH: CPT

## 2021-12-20 RX ORDER — 0.9 % SODIUM CHLORIDE 0.9 %
1000 INTRAVENOUS SOLUTION INTRAVENOUS ONCE
Status: COMPLETED | OUTPATIENT
Start: 2021-12-20 | End: 2021-12-21

## 2021-12-20 RX ORDER — PROMETHAZINE HYDROCHLORIDE 25 MG/1
25 TABLET ORAL 4 TIMES DAILY PRN
Qty: 10 TABLET | Refills: 0 | Status: SHIPPED | OUTPATIENT
Start: 2021-12-20 | End: 2021-12-27 | Stop reason: SDUPTHER

## 2021-12-20 RX ORDER — DIPHENHYDRAMINE HYDROCHLORIDE 50 MG/ML
12.5 INJECTION INTRAMUSCULAR; INTRAVENOUS ONCE
Status: COMPLETED | OUTPATIENT
Start: 2021-12-20 | End: 2021-12-20

## 2021-12-20 RX ORDER — PROMETHAZINE HYDROCHLORIDE 25 MG/ML
25 INJECTION, SOLUTION INTRAMUSCULAR; INTRAVENOUS ONCE
Status: COMPLETED | OUTPATIENT
Start: 2021-12-20 | End: 2021-12-20

## 2021-12-20 RX ORDER — ONDANSETRON 4 MG/1
4 TABLET, ORALLY DISINTEGRATING ORAL ONCE
Status: COMPLETED | OUTPATIENT
Start: 2021-12-20 | End: 2021-12-20

## 2021-12-20 RX ORDER — LIDOCAINE HYDROCHLORIDE 20 MG/ML
15 SOLUTION OROPHARYNGEAL ONCE
Status: COMPLETED | OUTPATIENT
Start: 2021-12-20 | End: 2021-12-20

## 2021-12-20 RX ORDER — ALPRAZOLAM 0.5 MG/1
0.5 TABLET ORAL NIGHTLY PRN
Qty: 10 TABLET | Refills: 0 | Status: SHIPPED | OUTPATIENT
Start: 2021-12-20 | End: 2022-01-07 | Stop reason: DRUGHIGH

## 2021-12-20 RX ORDER — KETOROLAC TROMETHAMINE 30 MG/ML
30 INJECTION, SOLUTION INTRAMUSCULAR; INTRAVENOUS ONCE
Status: COMPLETED | OUTPATIENT
Start: 2021-12-20 | End: 2021-12-20

## 2021-12-20 RX ORDER — ALPRAZOLAM 0.25 MG/1
0.5 TABLET ORAL NIGHTLY PRN
Status: DISCONTINUED | OUTPATIENT
Start: 2021-12-20 | End: 2021-12-20

## 2021-12-20 RX ORDER — ALPRAZOLAM 0.25 MG/1
0.5 TABLET ORAL ONCE
Status: COMPLETED | OUTPATIENT
Start: 2021-12-20 | End: 2021-12-20

## 2021-12-20 RX ORDER — MAGNESIUM HYDROXIDE/ALUMINUM HYDROXICE/SIMETHICONE 120; 1200; 1200 MG/30ML; MG/30ML; MG/30ML
30 SUSPENSION ORAL ONCE
Status: COMPLETED | OUTPATIENT
Start: 2021-12-20 | End: 2021-12-20

## 2021-12-20 RX ORDER — PROMETHAZINE HYDROCHLORIDE 25 MG/1
25 TABLET ORAL ONCE
Status: COMPLETED | OUTPATIENT
Start: 2021-12-20 | End: 2021-12-20

## 2021-12-20 RX ADMIN — PROMETHAZINE HYDROCHLORIDE 25 MG: 25 TABLET ORAL at 03:40

## 2021-12-20 RX ADMIN — KETOROLAC TROMETHAMINE 30 MG: 30 INJECTION, SOLUTION INTRAMUSCULAR; INTRAVENOUS at 23:29

## 2021-12-20 RX ADMIN — ONDANSETRON 4 MG: 4 TABLET, ORALLY DISINTEGRATING ORAL at 08:35

## 2021-12-20 RX ADMIN — ALPRAZOLAM 0.5 MG: 0.25 TABLET ORAL at 03:41

## 2021-12-20 RX ADMIN — PROMETHAZINE HYDROCHLORIDE 25 MG: 25 INJECTION INTRAMUSCULAR; INTRAVENOUS at 22:48

## 2021-12-20 RX ADMIN — SODIUM CHLORIDE 1000 ML: 9 INJECTION, SOLUTION INTRAVENOUS at 23:25

## 2021-12-20 RX ADMIN — LIDOCAINE HYDROCHLORIDE 15 ML: 20 SOLUTION ORAL; TOPICAL at 07:25

## 2021-12-20 RX ADMIN — ALUMINUM HYDROXIDE, MAGNESIUM HYDROXIDE, AND SIMETHICONE 30 ML: 200; 200; 20 SUSPENSION ORAL at 07:25

## 2021-12-20 RX ADMIN — DIPHENHYDRAMINE HYDROCHLORIDE 12.5 MG: 50 INJECTION, SOLUTION INTRAMUSCULAR; INTRAVENOUS at 23:29

## 2021-12-20 ASSESSMENT — PAIN SCALES - GENERAL
PAINLEVEL_OUTOF10: 8
PAINLEVEL_OUTOF10: 10

## 2021-12-20 ASSESSMENT — ENCOUNTER SYMPTOMS
EYE PAIN: 0
ABDOMINAL PAIN: 0
COLOR CHANGE: 0
SHORTNESS OF BREATH: 0
BACK PAIN: 0

## 2021-12-20 ASSESSMENT — PAIN DESCRIPTION - LOCATION: LOCATION: ABDOMEN

## 2021-12-20 ASSESSMENT — PAIN DESCRIPTION - PAIN TYPE: TYPE: ACUTE PAIN

## 2021-12-20 NOTE — ED NOTES
Mode of arrival (squad #, walk in, police, etc) : walk in        Chief complaint(s): abdominal pain, diarrhea, anxiety, depression        Arrival Note (brief scenario, treatment PTA, etc). : Pt states he was just discharged from [address] [place] [address],[address] ER a few hours ago for the same symptoms. Pt states he was discharged with phenergan and xanax but was not able to get them filled during the middle of the night. Pt states his symptoms have persisted so he is back. Pt denies any SI or HI. Pt states \"I just want to talk to a  while i'm here\". Pt is A&Ox4, eupneic, PWD. GCS=15. Call light in reach. C= \"Have you ever felt that you should Cut down on your drinking? \"  No  A= \"Have people Annoyed you by criticizing your drinking? \"  No  G= \"Have you ever felt bad or Guilty about your drinking? \"  No  E= \"Have you ever had a drink as an Eye-opener first thing in the morning to steady your nerves or to help a hangover? \"  No      Deferred []      Reason for deferring: N/A    *If yes to two or more: probable alcohol abuse. Valerie Brasher RN  12/20/21 2686

## 2021-12-20 NOTE — ED PROVIDER NOTES
other systems reviewed and are negative.     PASTMEDICAL HISTORY     Past Medical History:   Diagnosis Date    Anxiety     Arthritis     Asthma     Bipolar I disorder, most recent episode (or current) depressed, unspecified 9/12/2014    Clostridium difficile infection     COPD (chronic obstructive pulmonary disease) ([facility]     Depression     Disease of blood and blood forming organ     Eczema     Fracture, metacarpal     R 4th and 5th    Gastric ulcer     Gastritis 06/13/2018    GERD (gastroesophageal reflux disease)     GI bleed     H. 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[facility])     Neuromuscular disorder ([facility])     [facility]APA syndrome [facility]     PUD (peptic ulcer disease)     Rheumatoid arthritis ([facility]     Rheumatoid arthritis(714.0)     Severe recurrent major depression without psychotic features ([facility] 11/21/2021    Sleep apnea     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[facility]     Substance abuse ([facility]     Hx of opiates, benzos, cocaine, alcohol abuse    Suicidal ideation     Suicide attempt by hanging ([facility]     Tobacco dependence     Ulcerative colitis ([facility]     UTI (urinary tract infection)      Past Problem List  Patient Active Problem List   Diagnosis Code    Opioid abuse ([facility] F11.10    Noncompliance Z91.19    Rectal bleeding K62.5    Smoker F17.200    Juvenile rheumatoid arthritis ([facility] M08.00    SRIRAM (obstructive sleep apnea) G47.33    Primary insomnia F51.01    Calculus of bile duct with acute on chronic cholecystitis K80.46    Mild intermittent asthma without complication K07.83    Gastritis K29.70    Generalized abdominal pain R10.84    Anemia D64.9    Elevated liver enzymes R74.8    Nausea and vomiting R11.2    Opioid type dependence, continuous ([facility]) F11.20    Abdominal pain R10.9    Diarrhea R19.7    Irritable bowel syndrome with both constipation and diarrhea K58.2    Schizoaffective disorder, bipolar type ([facility] F25.0    GI bleed K92.2    Depression with suicidal ideation F32. A, R45.851    Schizoaffective disorder ([facility] F25.9    Severe recurrent major depression without psychotic features [facility] F33.2     SURGICAL HISTORY       Past Surgical History:   Procedure Laterality Date    ABDOMEN SURGERY      upper GI scope 7/7/2015    BRONCHOSCOPY      CHOLECYSTECTOMY, LAPAROSCOPIC  07/14/2017    surgery performed at [address] [place] [address], COLON, DIAGNOSTIC      OTHER SURGICAL HISTORY      lumbar puncture    LA COLONOSCOPY W/BIOPSY SINGLE/MULTIPLE  8/9/2017    COLONOSCOPY WITH BIOPSY performed by [facility]yn Cruz MD at [facility]    LA EGD TRANSORAL BIOPSY SINGLE/MULTIPLE N/A 3/20/2017    EGD BIOPSY performed by Sameera Blackmon MD at [facility]    LA ESOPHAGOGASTRODUODENOSCOPY TRANSORAL DIAGNOSTIC N/A 8/9/2017    EGD ESOPHAGOGASTRODUODENOSCOPY performed by Carolyn Cruz MD at [facility] N/A 3/20/2017    SIGMOIDOSCOPY DIAGNOSTIC FLEXIBLE performed by Sameera Blackmon MD at [address] [place] [address]  2/4/16    UPPER GASTROINTESTINAL ENDOSCOPY N/A 6/13/2018    GASTRITIS    UPPER GASTROINTESTINAL ENDOSCOPY N/A 9/20/2018    EGD BIOPSY performed by Moriah Valentine MD at [address] [place] [address]       Discharge Medication List as of 12/20/2021  7:52 AM      CONTINUE these medications which have NOT CHANGED    Details   ALPRAZolam (XANAX) 0.5 MG tablet Take 1 tablet by mouth nightly as needed for Sleep for up to 30 days. , Disp-10 tablet, R-0Print      promethazine (PHENERGAN) 25 MG tablet Take 1 tablet by mouth 4 times daily as needed for Nausea, Disp-10 tablet, R-0Normal      buprenorphine-naloxone (SUBOXONE) 12-3 MG sublingual film Place 1 Film under the tongue daily for 25 days. , Disp-25 Film, R-0Normal      cloNIDine (CATAPRES) 0.1 MG tablet Take 1 tablet by mouth 3 times daily, Disp-90 tablet, opiates, benzos, cocaine, alcohol abuse     PHYSICAL EXAM     INITIAL VITALS: BP (!) 141/88   Pulse 98   Temp 98 °F (36.7 °C) (Oral)   Resp 16   Ht 5' 9\" (1.753 m)   Wt 220 lb (99.8 kg)   SpO2 95%   BMI 32.49 kg/m²    Physical Exam  Vitals and nursing note reviewed. Constitutional:       Appearance: Normal appearance. HENT:      Head: Normocephalic and atraumatic. Right Ear: External ear normal.      Left Ear: External ear normal.      Nose: Nose normal.      Mouth/Throat:      Mouth: Mucous membranes are moist.   Eyes:      General: No visual field deficit. Pupils: Pupils are equal, round, and reactive to light. Cardiovascular:      Rate and Rhythm: Normal rate and regular rhythm. Pulses: Normal pulses. Heart sounds: Normal heart sounds. Pulmonary:      Effort: Pulmonary effort is normal.      Breath sounds: Normal breath sounds. Abdominal:      General: Abdomen is flat. There is no distension. Palpations: Abdomen is soft. Tenderness: There is generalized abdominal tenderness. Musculoskeletal:         General: No tenderness. Normal range of motion. Cervical back: Neck supple. Skin:     General: Skin is warm and dry. Capillary Refill: Capillary refill takes less than 2 seconds. Neurological:      General: No focal deficit present. Mental Status: He is alert and oriented to person, place, and time. Cranial Nerves: Cranial nerves are intact. No cranial nerve deficit, dysarthria or facial asymmetry. Sensory: Sensation is intact. No sensory deficit. Motor: Motor function is intact. No weakness. Coordination: Coordination is intact. Gait: Gait is intact. Psychiatric:         Attention and Perception: He does not perceive auditory or visual hallucinations. Behavior: Behavior normal.         Thought Content: Thought content does not include homicidal or suicidal ideation.  Thought content does not include homicidal or suicidal plan. MEDICAL DECISION MAKIN-year-old male presents for complaint of abdominal pain and depression. On initial exam patient in no acute distress vitals are stable, on exam patient with generalized abdominal tenderness, patient with known history of chronic abdominal pain, will provide symptomatic treatment and reassess, patient evaluated by social work as well for his complaints of depression, patient without any active suicidal or homicidal ideation at this time, do not feel that patient would benefit from an admission for mental health    7:51 AM EST  Patient was reevaluated was sleeping, when woken up patient reports feeling better, suspect that this is patient's chronic epigastric pain, given that he is feeling better, do not feel further work-up needed at this time, will provide with information for GI follow-up      Patient/Guardian was informed of their diagnosis and told to follow up with PCP & GI in 1-3 days. Patient demonstrates understanding and agreement with the plan. They were given the opportunity to ask questions and those questions were answered to the best of our ability with the available information. Patient/Guardian told to return to the ED for any new, worsening, changing or persistent symptoms. This dictation was prepared using [employer] voice recognition software. CRITICAL CARE:       PROCEDURES:    Procedures    DIAGNOSTIC RESULTS   EKG:All EKG's are interpreted by the Emergency Department Physician who either signs or Co-signs this chart in the absence of a cardiologist.        RADIOLOGY:All plain film, CT, MRI, and formal ultrasound images (except ED bedside ultrasound) are read by the radiologist, see reports below, unless otherwisenoted in MDM or here. No orders to display     LABS: All lab results were reviewed by myself, and all abnormals are listed below.   Labs Reviewed - No data to display    EMERGENCY DEPARTMENTCOURSE:         Vitals: Vitals:    12/20/21 0652   BP: (!) 141/88   Pulse: 98   Resp: 16   Temp: 98 °F (36.7 °C)   TempSrc: Oral   SpO2: 95%   Weight: 220 lb (99.8 kg)   Height: 5' 9\" (1.753 m)       The patient was given the following medications while in the emergency department:  Orders Placed This Encounter   Medications    aluminum & magnesium hydroxide-simethicone (MAALOX) 200-200-20 MG/5ML suspension 30 mL    lidocaine viscous hcl (XYLOCAINE) 2 % solution 15 mL    ondansetron (ZOFRAN-ODT) disintegrating tablet 4 mg     CONSULTS:  None    FINAL IMPRESSION      1. Anxiety    2. Chronic abdominal pain          DISPOSITION/PLAN   DISPOSITION Decision To Discharge 12/20/2021 07:51:53 AM      PATIENT REFERRED TO:  Danish Khan, APRN - CNP  [address]  [phone]    Schedule an appointment as soon as possible for a visit       [facility] ED  [facility] [address] [place] [address]  [phone]    As needed, If symptoms worsen    Alexandria Roldan MD  [address]  [address] [place] [address]  [phone]    Schedule an appointment as soon as possible for a visit       DISCHARGE MEDICATIONS:  Discharge Medication List as of 12/20/2021  7:52 AM        The care is provided during an unprecedented national emergency due to the novel coronavirus, COVID 19.   Michelle Peter DO  Attending Emergency Physician                  Michelle Peter DO  12/20/21 0488

## 2021-12-20 NOTE — ED NOTES
Patient took half of the GI cocktail.  Patient states he doesn't want the rest.      Liv Lewis RN  12/20/21 7794

## 2021-12-20 NOTE — ED PROVIDER NOTES
EMERGENCY DEPARTMENT ENCOUNTER    Pt Name: Sonny Mathis  MRN: [id number]  [place] 1990  Date of evaluation: 12/20/21  CHIEF COMPLAINT       Chief Complaint   Patient presents with    Diarrhea     HISTORY OF PRESENT ILLNESS   Patient is a 80-year-old male who presents to the ED complaining of anxiety abdominal pain and diarrhea. Symptoms have been present for 3 to 4 days. He reports that he has used Phenergan in the past with good results. No other issues at this time. ROS:  No fevers, cough, shortness of breath, chest pain, changes in urine.       REVIEW OF SYSTEMS     Review of Systems  PASTMEDICAL HISTORY     Past Medical History:   Diagnosis Date    Anxiety     Arthritis     Asthma     Bipolar I disorder, most recent episode (or current) depressed, unspecified 9/12/2014    Clostridium difficile infection     COPD (chronic obstructive pulmonary disease) ([address]     Depression     Disease of blood and blood forming organ     Eczema     Fracture, metacarpal     R 4th and 5th    Gastric ulcer     Gastritis 06/13/2018    GERD (gastroesophageal reflux disease)     GI bleed     H. 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[address]     Substance abuse ([address]     Hx of opiates, benzos, cocaine, alcohol abuse    Suicidal ideation     Suicide attempt by hanging ([address]     Tobacco dependence     Ulcerative colitis ([address]     UTI (urinary tract infection)      SURGICAL HISTORY       Past Surgical History:   Procedure Laterality Date    ABDOMEN SURGERY      upper GI scope 7/7/2015    BRONCHOSCOPY      CHOLECYSTECTOMY, LAPAROSCOPIC  07/14/2017 surgery performed at [address], COLON, DIAGNOSTIC      OTHER SURGICAL HISTORY      lumbar puncture    CA COLONOSCOPY W/BIOPSY SINGLE/MULTIPLE  8/9/2017    COLONOSCOPY WITH BIOPSY performed by Carmelita Ocampo MD at [facility]    CA EGD TRANSORAL BIOPSY SINGLE/MULTIPLE N/A 3/20/2017    EGD BIOPSY performed by Jacob Meraz MD at [facility]    CA ESOPHAGOGASTRODUODENOSCOPY TRANSORAL DIAGNOSTIC N/A 8/9/2017    EGD ESOPHAGOGASTRODUODENOSCOPY performed by Carmelita Ocampo MD at [address] N/A 3/20/2017    SIGMOIDOSCOPY DIAGNOSTIC FLEXIBLE performed by Jacob Meraz MD at [facility]  2/4/16    UPPER GASTROINTESTINAL ENDOSCOPY N/A 6/13/2018    GASTRITIS    UPPER GASTROINTESTINAL ENDOSCOPY N/A 9/20/2018    EGD BIOPSY performed by Amadou Lund MD at [address] [place] [address]       Previous Medications    ASCORBIC ACID (VITAMIN C) 500 MG TABLET    Take 1 tablet by mouth 2 times daily for 7 days    BUPRENORPHINE-NALOXONE (SUBOXONE) 12-3 MG SUBLINGUAL FILM    Place 1 Film under the tongue daily for 25 days. CHLORPROMAZINE (THORAZINE) 50 MG TABLET    Take 1 tablet by mouth 2 times daily as needed (anxiety)    CLONIDINE (CATAPRES) 0.1 MG TABLET    Take 1 tablet by mouth 3 times daily    GABAPENTIN (NEURONTIN) 600 MG TABLET    Take 1 tablet by mouth 4 times daily for 30 days.     METFORMIN (GLUCOPHAGE) 500 MG TABLET    Take 1 tablet by mouth 2 times daily (with meals)    PANTOPRAZOLE (PROTONIX) 40 MG TABLET    Take 1 tablet by mouth every morning (before breakfast)    QUETIAPINE (SEROQUEL) 100 MG TABLET    Take 1 tablet by mouth nightly    SUCRALFATE (CARAFATE) 1 GM TABLET    Take 1 tablet by mouth 4 times daily    TRAZODONE (DESYREL) 50 MG TABLET    Take 1 tablet by mouth nightly as needed for Sleep     ALLERGIES     is allergic to dicyclomine, famotidine, geodon [ziprasidone hcl], haloperidol, iv dye [iodides], olanzapine, reglan [metoclopramide], ibuprofen, aspirin, dicyclomine hcl, hydroxyzine hcl, iodine, metronidazole, mirtazapine, and ziprasidone. FAMILY HISTORY     He indicated that his mother is alive. He indicated that his father is alive. He indicated that the status of his sister is unknown. He indicated that the status of his other is unknown. He indicated that the status of his paternal cousin is unknown. SOCIAL HISTORY       Social History     Tobacco Use    Smoking status: Current Every Day Smoker     Packs/day: 0.50     Years: 15.00     Pack years: 7.50     Types: Cigarettes    Smokeless tobacco: Never Used   Vaping Use    Vaping Use: Former   Substance Use Topics    Alcohol use: Yes     Comment: refused to answer how much but states he has been partaking more since his discharge from tx    Drug use: Yes     Types: Opiates , Cocaine     Comment: Hx of opiates, benzos, cocaine, alcohol abuse     PHYSICAL EXAM     INITIAL VITALS: BP (!) 152/80   Pulse 108   Temp 98.6 °F (37 °C) (Oral)   Resp 16   Ht 5' 9\" (1.753 m)   Wt 222 lb 3.2 oz (100.8 kg)   SpO2 95%   BMI 32.81 kg/m²    Physical Exam    MEDICAL DECISION MAKING:   The patient is hemodynamically stable, afebrile, nontoxic-appearing. Physical exam unremarkable. Based on history and exam likely acute gastritis with anxiety. ED plan for Xanax 0.5 mg p.o., Phenergan 25 mg p.o., discharge           DIAGNOSTIC RESULTS   EKG:All EKG's are interpreted by the Emergency Department Physician who either signs or Co-signs this chart in the absence of a cardiologist.        RADIOLOGY:All plain film, CT, MRI, and formal ultrasound images (except ED bedside ultrasound) are read by the radiologist, see reports below, unless otherwisenoted in MDM or here. No orders to display     LABS: All lab results were reviewed by myself, and all abnormals are listed below.   Labs Reviewed - No data to display    EMERGENCY DEPARTMENTCOURSE:   Patient did well in the ED. Symptoms improved with treatment plan. Given Rx for Xanax and Phenergan. No further work-up indicated at this time. Nursing notes reviewed. At this time this is what I find, the patient appears well and does not appear sick or toxic. I gave my usual and customary discussion of the risks and benefits of discharge versus admission. I answered the patient's questions. I gave the patient strict return precautions. Patient expressed understanding of the discharge instructions. The care is provided during an unprecedented national emergency due to the novel coronavirus, COVID-19. Dictated but not reviewed. Vitals:    Vitals:    12/20/21 0201   BP: (!) 152/80   Pulse: 108   Resp: 16   Temp: 98.6 °F (37 °C)   TempSrc: Oral   SpO2: 95%   Weight: 222 lb 3.2 oz (100.8 kg)   Height: 5' 9\" (1.753 m)       The patient was given the following medications while in the emergency department:  Orders Placed This Encounter   Medications    promethazine (PHENERGAN) tablet 25 mg    ALPRAZolam (XANAX) tablet 0.5 mg    ALPRAZolam (XANAX) 0.5 MG tablet     Sig: Take 1 tablet by mouth nightly as needed for Sleep for up to 30 days. Dispense:  10 tablet     Refill:  0    promethazine (PHENERGAN) 25 MG tablet     Sig: Take 1 tablet by mouth 4 times daily as needed for Nausea     Dispense:  10 tablet     Refill:  0     CONSULTS:  None    FINAL IMPRESSION      1. Diarrhea, unspecified type    2. Other acute gastritis without hemorrhage          DISPOSITION/PLAN   DISPOSITION Decision To Discharge 12/20/2021 03:14:13 AM      PATIENT REFERRED TO:  Mayra Blizzard, APRN - CNP  [address] [place] [address] [address]  [phone]    In 2 days      DISCHARGE MEDICATIONS:  New Prescriptions    ALPRAZOLAM (XANAX) 0.5 MG TABLET    Take 1 tablet by mouth nightly as needed for Sleep for up to 30 days.     PROMETHAZINE (PHENERGAN) 25 MG TABLET    Take 1 tablet by mouth 4 times daily as needed for Nausea     Ezekiel Aggarwal MD  Attending Emergency Physician                    Enrrique Zapata MD  12/20/21 7911

## 2021-12-20 NOTE — ED NOTES
Patient seen in room and reports having some depression but denies any SI HI AH VH - was seen earlier at [facility] ED for [address]. Patient has hx of malingering, reports he is following up with his virtual visits with [facility] for his suboxone but does not follow up with his psych or medical appointments. Patient has hx of drug seeking behaviors and use with opioids and crack cocaine use. Patient has long hx of noncompliance with mental health and AOD services, has long hx of inpatient MH and AOD services with poor follow through and compliance. Patient was last admitted for psych in November and discharged with scheduled follow up with [facility] behavioral and did not attend appointment. Patient provided information on free clinics and walk in hours for [facility] and AOD providers. Patient reports he will return to brother's home where he has been staying when discharged and will utilize his cab services.

## 2021-12-21 VITALS
TEMPERATURE: 98 F | RESPIRATION RATE: 14 BRPM | OXYGEN SATURATION: 98 % | DIASTOLIC BLOOD PRESSURE: 83 MMHG | BODY MASS INDEX: 33.45 KG/M2 | SYSTOLIC BLOOD PRESSURE: 136 MMHG | WEIGHT: 220 LBS | HEART RATE: 80 BPM

## 2021-12-21 DIAGNOSIS — Z76.0 ENCOUNTER FOR MEDICATION REFILL: Primary | ICD-10-CM

## 2021-12-21 DIAGNOSIS — R45.851 SUICIDAL IDEATION: ICD-10-CM

## 2021-12-21 LAB
ABSOLUTE EOS #: 0.14 K/UL (ref 0–0.44)
ABSOLUTE IMMATURE GRANULOCYTE: 0.03 K/UL (ref 0–0.3)
ABSOLUTE LYMPH #: 2.92 K/UL (ref 1.1–3.7)
ABSOLUTE MONO #: 0.74 K/UL (ref 0.1–1.2)
ALBUMIN SERPL-MCNC: 4.5 G/DL (ref 3.5–5.2)
ALBUMIN/GLOBULIN RATIO: 1.3 (ref 1–2.5)
ALP BLD-CCNC: 73 U/L (ref 40–129)
ALT SERPL-CCNC: 11 U/L (ref 5–41)
ANION GAP SERPL CALCULATED.3IONS-SCNC: 13 MMOL/L (ref 9–17)
AST SERPL-CCNC: 16 U/L
BASOPHILS # BLD: 0 % (ref 0–2)
BASOPHILS ABSOLUTE: 0.03 K/UL (ref 0–0.2)
BILIRUB SERPL-MCNC: 0.45 MG/DL (ref 0.3–1.2)
BILIRUBIN DIRECT: 0.09 MG/DL
BILIRUBIN, INDIRECT: 0.36 MG/DL (ref 0–1)
BUN BLDV-MCNC: 13 MG/DL (ref 6–20)
BUN/CREAT BLD: ABNORMAL (ref 9–20)
CALCIUM SERPL-MCNC: 9 MG/DL (ref 8.6–10.4)
CHLORIDE BLD-SCNC: 100 MMOL/L (ref 98–107)
CO2: 25 MMOL/L (ref 20–31)
CREAT SERPL-MCNC: 0.7 MG/DL (ref 0.7–1.2)
DIFFERENTIAL TYPE: NORMAL
EOSINOPHILS RELATIVE PERCENT: 2 % (ref 1–4)
GFR AFRICAN AMERICAN: >60 ML/MIN
GFR NON-AFRICAN AMERICAN: >60 ML/MIN
GFR SERPL CREATININE-BSD FRML MDRD: ABNORMAL ML/MIN/{1.73_M2}
GFR SERPL CREATININE-BSD FRML MDRD: ABNORMAL ML/MIN/{1.73_M2}
GLOBULIN: NORMAL G/DL (ref 1.5–3.8)
GLUCOSE BLD-MCNC: 93 MG/DL (ref 70–99)
HCT VFR BLD CALC: 40.7 % (ref 40.7–50.3)
HEMOGLOBIN: 13.8 G/DL (ref 13–17)
IMMATURE GRANULOCYTES: 0 %
LIPASE: 31 U/L (ref 13–60)
LYMPHOCYTES # BLD: 32 % (ref 24–43)
MCH RBC QN AUTO: 29.9 PG (ref 25.2–33.5)
MCHC RBC AUTO-ENTMCNC: 33.9 G/DL (ref 28.4–34.8)
MCV RBC AUTO: 88.1 FL (ref 82.6–102.9)
MONOCYTES # BLD: 8 % (ref 3–12)
NRBC AUTOMATED: 0 PER 100 WBC
PDW BLD-RTO: 13.6 % (ref 11.8–14.4)
PLATELET # BLD: 292 K/UL (ref 138–453)
PLATELET ESTIMATE: NORMAL
PMV BLD AUTO: 10.3 FL (ref 8.1–13.5)
POTASSIUM SERPL-SCNC: 3.6 MMOL/L (ref 3.7–5.3)
RBC # BLD: 4.62 M/UL (ref 4.21–5.77)
RBC # BLD: NORMAL 10*6/UL
SEG NEUTROPHILS: 58 % (ref 36–65)
SEGMENTED NEUTROPHILS ABSOLUTE COUNT: 5.38 K/UL (ref 1.5–8.1)
SODIUM BLD-SCNC: 138 MMOL/L (ref 135–144)
TOTAL PROTEIN: 7.9 G/DL (ref 6.4–8.3)
WBC # BLD: 9.2 K/UL (ref 3.5–11.3)
WBC # BLD: NORMAL 10*3/UL

## 2021-12-21 PROCEDURE — 99284 EMERGENCY DEPT VISIT MOD MDM: CPT

## 2021-12-21 RX ORDER — QUETIAPINE FUMARATE 100 MG/1
100 TABLET, FILM COATED ORAL NIGHTLY
Qty: 30 TABLET | Refills: 0 | Status: SHIPPED | OUTPATIENT
Start: 2021-12-21 | End: 2021-12-27 | Stop reason: SDUPTHER

## 2021-12-21 ASSESSMENT — PAIN DESCRIPTION - LOCATION: LOCATION: ABDOMEN

## 2021-12-21 ASSESSMENT — ENCOUNTER SYMPTOMS
ABDOMINAL PAIN: 0
DIARRHEA: 1
COUGH: 0
ABDOMINAL PAIN: 1
PHOTOPHOBIA: 0
NAUSEA: 0
SHORTNESS OF BREATH: 0
DIARRHEA: 0
CONSTIPATION: 0
SORE THROAT: 1
SORE THROAT: 0
SHORTNESS OF BREATH: 0
COUGH: 0
VOMITING: 0
BLOOD IN STOOL: 0

## 2021-12-21 ASSESSMENT — PAIN SCALES - GENERAL: PAINLEVEL_OUTOF10: 2

## 2021-12-21 NOTE — ED PROVIDER NOTES
Faculty Sign-Out Attestation  Handoff taken on the following patient from prior Attending Physician: Sheri Falk    I was available and discussed any additional care issues that arose and coordinated the management plans with the resident(s) caring for the patient during my duty period. Any areas of disagreement with residents documentation of care or procedures are noted on the chart. I was personally present for the key portions of any/all procedures during my duty period. I have documented in the chart those procedures where I was not present during the key portions. 80-year-old male with sore throat and nausea vomiting. Labs pending, anticipate discharge. Labs with no significant abnormalities. Will discharge with outpatient follow-up.     Sheila Newton MD  Attending Physician       Sheila Newton MD  12/21/21 6724

## 2021-12-21 NOTE — ED PROVIDER NOTES
[facility] ED     Emergency Department     Faculty Attestation    I performed a history and physical examination of the patient and discussed management with the resident. I reviewed the residents note and agree with the documented findings and plan of care. Any areas of disagreement are noted on the chart. I was personally present for the key portions of any procedures. I have documented in the chart those procedures where I was not present during the key portions. I have reviewed the emergency nurses triage note. I agree with the chief complaint, past medical history, past surgical history, allergies, medications, social and family history as documented unless otherwise noted below. For Physician Assistant/ Nurse Practitioner cases/documentation I have personally evaluated this patient and have completed at least one if not all key elements of the E/M (history, physical exam, and MDM). Additional findings are as noted. This patient was evaluated in the Emergency Department for symptoms described in the history of present illness. He/she was evaluated in the context of the global COVID-19 pandemic, which necessitated consideration that the patient might be at risk for infection with the SARS-CoV-2 virus that causes COVID-19. Institutional protocols and algorithms that pertain to the evaluation of patients at risk for COVID-19 are in a state of rapid change based on information released by regulatory bodies including the CDC and federal and state organizations. These policies and algorithms were followed during the patient's care in the ED. Patient here with variety of complaints. Complains of blood in the stools blood in his vomit. No symptoms currently. Complains of sore throat no trouble breathing no trouble swallowing. No respiratory distress no fevers. Well-appearing on exam chatting with the nurse.   Will check labs, probable discharge      Critical Care     none    [facility] Katerin Allen MD, Luis Sheth  Attending Emergency  Physician             Evie Mar MD  12/20/21 7509

## 2021-12-21 NOTE — ED PROVIDER NOTES
[address] ED  Emergency Department Encounter  EmergencyMedicine Resident     Pt Willi Robbins  MRN: [id number]  Darcigfmohinder 1990  Date of evaluation: 12/21/21  PCP:  MASSIEL Mukherjee CNP    CHIEF COMPLAINT       Chief Complaint   Patient presents with    Medication Refill    Homeless       HISTORY OF PRESENT ILLNESS  (Location/Symptom, Timing/Onset, Context/Setting, Quality, Duration, Modifying Factors, Severity.)      Oniel Brito is a 32 y.o. male who presents with suicidal ideation and medication refill. Patient notes that for the past week he has been out of all of his psychiatric medications. He notes that he is not able to get these medications refilled at the [facility] as they have been \"giving him a hard time\", and he needs to see a doctor for medication refill. He notes that he has thoughts of hurting himself but denies any plans and has no access to guns, weapons, or pills to overdose on. He notes that he is never tried to kill himself in the past and has no homicidal ideation. He denies any kind of hallucinations or command hallucinations. In addition he notes that he is tired and would like a blanket and would like to be admitted for his suicidal thoughts. Of note patient was just seen at [facility] on 12/2/2021. At that time he had some nausea and abdominal pain which he notes has since resolved. He notes that he is feeling better from the last visit. Denies any fevers, chills, chest pain, shortness of breath, abdominal pain, nausea/vomiting, diarrhea/constipation, melena/hematochezia, or recreational substance use.     PAST MEDICAL / SURGICAL / SOCIAL / FAMILY HISTORY      has a past medical history of Anxiety, Arthritis, Asthma, Bipolar I disorder, most recent episode (or current) depressed, unspecified, Clostridium difficile infection, COPD (chronic obstructive pulmonary disease) ([facility] [address] Depression, Disease of blood and blood forming organ, Eczema, Fracture, metacarpal, Gastric ulcer, Gastritis, GERD (gastroesophageal reflux disease), GI bleed, H. pylori infection, H/O blood clots, Head injury, Headache, Insomnia, Juvenile rheumatoid arthritis ([facility] [address] Neuromuscular disorder (HCC), PFAPA syndrome ([address] PUD (peptic ulcer disease), Rheumatoid arthritis ([address] Rheumatoid arthritis(714.0), Severe recurrent major depression without psychotic features ([facility] [address] Sleep apnea, 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[address] Substance abuse ([facility] [address] Suicidal ideation, Suicide attempt by hanging ([facility] [address] Tobacco dependence, Ulcerative colitis ([facility] [address] and UTI (urinary tract infection). has a past surgical history that includes Colonoscopy; bronchoscopy; other surgical history; Upper gastrointestinal endoscopy (2/4/16); pr egd transoral biopsy single/multiple (N/A, 3/20/2017); sigmoidoscopy (N/A, 3/20/2017); Cholecystectomy, laparoscopic (07/14/2017); pr esophagogastroduodenoscopy transoral diagnostic (N/A, 8/9/2017); pr colonoscopy w/biopsy single/multiple (8/9/2017); Abdomen surgery; Upper gastrointestinal endoscopy (N/A, 6/13/2018); Upper gastrointestinal endoscopy (N/A, 9/20/2018); and Endoscopy, colon, diagnostic.       Social History     Socioeconomic History    Marital status: Single     Spouse name: Not on file    Number of children: Not on file    Years of education: Not on file    Highest education level: Not on file   Occupational History    Occupation: disability   Tobacco Use    Smoking status: Current Every Day Smoker     Packs/day: 0.50     Years: 15.00     Pack years: 7.50     Types: Cigarettes    Smokeless tobacco: Never Used   Vaping Use    Vaping Use: Former   Substance and Sexual Activity    Alcohol use: Yes     Comment: refused to answer how much but states he has been partaking more since his discharge from tx    Drug use: Yes     Types: Opiates , Cocaine     Comment: Hx of opiates, benzos, cocaine, alcohol abuse    Sexual activity: Yes     Partners: Female Comment: Lives alone, not working. Other Topics Concern    Not on file   Social History Narrative    ** Merged History Encounter **          Social Determinants of Health     Financial Resource Strain: Medium Risk    Difficulty of Paying Living Expenses: Somewhat hard   Food Insecurity: No Food Insecurity    Worried About Running Out of Food in the Last Year: Never true    Micaela of Food in the Last Year: Never true   Transportation Needs: No Transportation Needs    Lack of Transportation (Medical): No    Lack of Transportation (Non-Medical):  No   Physical Activity: Inactive    Days of Exercise per Week: 0 days    Minutes of Exercise per Session: 0 min   Stress: Stress Concern Present    Feeling of Stress : Rather much   Social Connections: Socially Isolated    Frequency of Communication with Friends and Family: Never    Frequency of Social Gatherings with Friends and Family: Never    Attends Mandaen Services: Never    Active Member of Clubs or Organizations: No    Attends Club or Organization Meetings: Never    Marital Status: Never    Intimate Partner Violence: Not At Risk    Fear of Current or Ex-Partner: No    Emotionally Abused: No    Physically Abused: No    Sexually Abused: No   Housing Stability: Low Risk     Unable to Pay for Housing in the Last Year: No    Number of [place] in the Last Year: 1    Unstable Housing in the Last Year: No       Family History   Problem Relation Age of Onset    Diabetes Father     Alcohol Abuse Father     Depression Father     Arthritis Father     High Blood Pressure Father     Other Father         aneurysm & epilepsy    Migraines Father     Arthritis Mother     Other Mother         aneurysm & epilepsy    Migraines Mother     Diabetes Brother         Aunt and uncles    Depression Brother     Mental Illness Brother     Other Brother         epilepsy    Migraines Brother     Stroke Other         Uncle    Other Brother murdered Oct 6th, 2014    Colon Cancer Paternal Cousin 43    Other Sister         epilepsy   Amy Coral Migraines Sister        Allergies:  Dicyclomine, Famotidine, Geodon [ziprasidone hcl], Haloperidol, Iv dye [iodides], Olanzapine, Reglan [metoclopramide], Ibuprofen, Aspirin, Dicyclomine hcl, Hydroxyzine hcl, Iodine, Metronidazole, Mirtazapine, and Ziprasidone    Home Medications:  Prior to Admission medications    Medication Sig Start Date End Date Taking? Authorizing Provider   QUEtiapine (SEROQUEL) 100 MG tablet Take 1 tablet by mouth nightly 12/21/21  Yes Jordan Perez DO   ALPRAZolam Dahlia Mech) 0.5 MG tablet Take 1 tablet by mouth nightly as needed for Sleep for up to 30 days. 12/20/21 1/19/22  Boone Acevedo MD   promethazine (PHENERGAN) 25 MG tablet Take 1 tablet by mouth 4 times daily as needed for Nausea 12/20/21   Boone Acevedo MD   buprenorphine-naloxone (SUBOXONE) 12-3 MG sublingual film Place 1 Film under the tongue daily for 25 days. 12/2/21 12/27/21  MASSIEL Hicks CNP   cloNIDine (CATAPRES) 0.1 MG tablet Take 1 tablet by mouth 3 times daily 12/2/21   MASSIEL Hicks CNP   metFORMIN (GLUCOPHAGE) 500 MG tablet Take 1 tablet by mouth 2 times daily (with meals) 11/22/21   Chiquita You MD   chlorproMAZINE (THORAZINE) 50 MG tablet Take 1 tablet by mouth 2 times daily as needed (anxiety) 11/22/21   Chiquita You MD   sucralfate (CARAFATE) 1 GM tablet Take 1 tablet by mouth 4 times daily 11/22/21   Chiquita You MD   pantoprazole (PROTONIX) 40 MG tablet Take 1 tablet by mouth every morning (before breakfast) 11/23/21   Chiquita You MD   ascorbic acid (VITAMIN C) 500 MG tablet Take 1 tablet by mouth 2 times daily for 7 days 11/19/21 11/26/21  Aki Landry DO   gabapentin (NEURONTIN) 600 MG tablet Take 1 tablet by mouth 4 times daily for 30 days.  11/18/21 12/18/21  Lavera Prost, APRN - CNP   traZODone (DESYREL) 50 MG tablet Take 1 tablet by mouth nightly as needed for Sleep 11/1/21   Tian Ovalle MD   meloxicam (MOBIC) 7.5 MG tablet Take 1 tablet by mouth 2 times daily as needed for Pain 8/19/21 10/30/21  MASSIEL Medina - CNP       REVIEW OF SYSTEMS    (2-9 systems for level 4, 10 or more for level 5)      Review of Systems   Constitutional: Negative for chills, diaphoresis, fatigue and fever. HENT: Negative for congestion and sore throat. Eyes: Negative for photophobia and visual disturbance. Respiratory: Negative for cough and shortness of breath. Cardiovascular: Negative for chest pain and palpitations. Gastrointestinal: Negative for abdominal pain, blood in stool, constipation, diarrhea, nausea and vomiting. Genitourinary: Negative for dysuria and hematuria. Musculoskeletal: Negative for arthralgias and myalgias. Skin: Negative for rash and wound. Neurological: Negative for weakness and numbness. Psychiatric/Behavioral: Positive for dysphoric mood and suicidal ideas. Negative for hallucinations and self-injury. PHYSICAL EXAM   (up to 7 for level 4, 8 or more for level 5)      INITIAL VITALS:   /83   Pulse 80   Temp 98 °F (36.7 °C)   Resp 14   Wt 220 lb (99.8 kg)   SpO2 98%   BMI 33.45 kg/m²     Physical Exam  Vitals and nursing note reviewed. Constitutional:       General: He is not in acute distress. Appearance: Normal appearance. He is well-developed. He is obese. He is not toxic-appearing or diaphoretic. HENT:      Head: Normocephalic and atraumatic. Right Ear: External ear normal.      Left Ear: External ear normal.      Nose: Nose normal.      Mouth/Throat:      Mouth: Mucous membranes are moist.      Pharynx: Oropharynx is clear. Eyes:      General: No scleral icterus. Extraocular Movements: Extraocular movements intact. Conjunctiva/sclera: Conjunctivae normal.      Pupils: Pupils are equal, round, and reactive to light. Neck:      Vascular: No JVD.       Trachea: No tracheal deviation. Cardiovascular:      Rate and Rhythm: Normal rate and regular rhythm. Pulses: Normal pulses. Heart sounds: Normal heart sounds, S1 normal and S2 normal. No murmur heard. No friction rub. No gallop. Pulmonary:      Effort: Pulmonary effort is normal. No respiratory distress. Breath sounds: Normal breath sounds. Abdominal:      General: Abdomen is flat. There is no distension. Palpations: Abdomen is soft. Tenderness: There is no abdominal tenderness. There is no guarding or rebound. Musculoskeletal:         General: No swelling or tenderness (Bilateral calves nontender palpation. ). Normal range of motion. Cervical back: Normal range of motion and neck supple. No rigidity. Skin:     General: Skin is warm and dry. Capillary Refill: Capillary refill takes less than 2 seconds. Neurological:      General: No focal deficit present. Mental Status: He is alert and oriented to person, place, and time. Motor: No abnormal muscle tone. Comments: Moving all extremities spontaneously. Psychiatric:         Attention and Perception: Attention and perception normal.         Mood and Affect: Mood and affect normal.         Speech: Speech normal.         Behavior: Behavior normal. Behavior is cooperative. Thought Content: Thought content is not paranoid or delusional. Thought content includes suicidal ideation. Thought content does not include homicidal ideation. Thought content does not include homicidal or suicidal plan. Cognition and Memory: Cognition and memory normal.         Judgment: Judgment normal.         DIFFERENTIAL  DIAGNOSIS     PLAN (LABS / IMAGING / EKG):  No orders of the defined types were placed in this encounter.       MEDICATIONS ORDERED:  Orders Placed This Encounter   Medications    QUEtiapine (SEROQUEL) 100 MG tablet     Sig: Take 1 tablet by mouth nightly     Dispense:  30 tablet     Refill:  0       DDX: Medication refill, suicidal ideation, depression    DIAGNOSTIC RESULTS / EMERGENCY DEPARTMENT COURSE / [facility]   LAB RESULTS:  No results found for this visit on 12/21/21. IMPRESSION: 77-year-old male presents for suicidal ideation with need for psychiatric medication refill. Patient appears to be in no acute distress. Patient is speaking full sentences on respiratory distress. Abdomen is benign with no peritoneal signs. Hemodynamically stable. Patient does appear to have a depressed affect however is speaking normally with no pressured speech and is not interacting with any hallucinations while in the room. Concern for above differential diagnosis. Will have social work evaluate the patient and plan for outpatient follow-up for psychiatric medications. RADIOLOGY:  None    EKG  None    All EKG's are interpreted by the Emergency Department Physician who either signs or Co-signs this chart in the absence of a cardiologist.    EMERGENCY DEPARTMENT COURSE:  ED Course as of 12/21/21 0810   Tue Dec 21, 2021   0744 Talk to  notes that the patient now reports taking his Xanax the past few hours and now tired. Social work states that he is tired but is denying any kind of suicidal at this time but notes that he had suicidal thoughts yesterday but decided not to tell anyone at any of his previous ER visits. In addition patient is still denying consolidation. Plan for discharge and follow-up with his  center. As patient already has his Xanax, will give a prescription for 7 days of his quetiapine. [CS]   0809 Patient was updated bedside. Patient was getting up and walking comfortably without difficulty by taking the Xanax. Patient agreeable with discharge plan to follow-up with [facility] center immediately from the ER for establishment as patient for psychiatric help. He was educated return precautions.   [CS]      ED Course User Index  [CS] Rachel Salmeron DO PROCEDURES:  None    CONSULTS:  None    CRITICAL CARE:  Please see attending note    FINAL IMPRESSION      1. Encounter for medication refill    2. Suicidal ideation          DISPOSITION / PLAN     DISPOSITION Decision To Discharge 12/21/2021 07:45:49 AM      PATIENT REFERRED TO:  Carolin Guzman, APRN - CNP  [address] [place]   [phone]    Go to   As needed    [address] ED  [facility] [address]  [phone]  Go to   If symptoms worsen    [place] [address] [address] [address]  [phone]    Go today  for reevaluation regarding this visit      DISCHARGE MEDICATIONS:  Current Discharge Medication List          José Julian DO  Emergency Medicine Resident    (Please note that portions of thisnote were completed with a voice recognition program.  Efforts were made to edit the dictations but occasionally words are mis-transcribed.)       José Julian DO  Resident  12/21/21 Speedy Gloria DO  Resident  12/21/21 [id number]

## 2021-12-21 NOTE — ED NOTES
Per , Patient sent to outpatient center with safety plan.       Tereso Severance, RN  12/21/21 4355
to Emergency Department if you have any further medical concerns. Patient given National suicide prevention line at [phone]. Patient is to follow-up the [facility].

## 2021-12-21 NOTE — ED TRIAGE NOTES
Pt arrived to Ed with c/o sore throat, possible allergic reaction. Pt states he has watery diarrhea with blood in stool, abdominal pain, and nausea/vomiting. [place] Pt A&O x 4, does not appear in acute distress, RR even and unlabored, resting comfortably on stretcher with eyes open and call light in reach. Pt placed in a gown, on continuous monitor with alarms set, vitals and EKG obtained. Initial assessment performed by physician, [address] will carry out initial orders/tasks and reassess pt.

## 2021-12-21 NOTE — ED PROVIDER NOTES
[facility] ED  Emergency Department Encounter  EmergencyMedicine Resident     Pt Marina Vikashglenn Bradley Good  MRN: [id number]  [place] 1990  Date of evaluation: 12/20/21  PCP:  MASSIEL Zarate CNP    CHIEF COMPLAINT       Chief Complaint   Patient presents with    Pharyngitis    Pruritis    Other     blood in stool       HISTORY OF PRESENT ILLNESS  (Location/Symptom, Timing/Onset, Context/Setting, Quality, Duration, Modifying Factors, Severity.)      Sonny Mathis is a 32 y.o. male who presents with multiple complaints. He states that he has been nauseous, vomiting, having abdominal pain and watery diarrhea. He is well-known to the emergency department and has presented very similarly in the past.   He reports some speckled vomit. No blood in the stool. Presented very similarly to the GI office in March, GI recommended follow-up for colonoscopy and Zofran as needed. Patient has not had his colonoscopy yet. Additionally complains of throat irritation, states his medications do this. When listed in allergies he has multiple medications to cause throat irritation. Patient reports that he is poorly compliant to his schizoaffective medications and has not been following up with psychiatry. He denies any fevers, chills, chest pain, shortness of breath, urinary complaints.     PAST MEDICAL / SURGICAL / SOCIAL / FAMILY HISTORY      has a past medical history of Anxiety, Arthritis, Asthma, Bipolar I disorder, most recent episode (or current) depressed, unspecified, Clostridium difficile infection, COPD (chronic obstructive pulmonary disease) ([address] Depression, Disease of blood and blood forming organ, Eczema, Fracture, metacarpal, Gastric ulcer, Gastritis, GERD (gastroesophageal reflux disease), GI bleed, H. pylori infection, H/O blood clots, Head injury, Headache, Insomnia, Juvenile rheumatoid arthritis ([address] Neuromuscular disorder ([address] PFAPA syndrome ([address] PUD (peptic ulcer Dicyclomine hcl, Hydroxyzine hcl, Iodine, Metronidazole, Mirtazapine, and Ziprasidone    Home Medications:  Prior to Admission medications    Medication Sig Start Date End Date Taking? Authorizing Provider   ALPRAZolam Brittani Norman) 0.5 MG tablet Take 1 tablet by mouth nightly as needed for Sleep for up to 30 days. 12/20/21 1/19/22  Alice Arnett MD   promethazine (PHENERGAN) 25 MG tablet Take 1 tablet by mouth 4 times daily as needed for Nausea 12/20/21   Alice Arnett MD   buprenorphine-naloxone (SUBOXONE) 12-3 MG sublingual film Place 1 Film under the tongue daily for 25 days. 12/2/21 12/27/21  MASSIEL Barraza CNP   cloNIDine (CATAPRES) 0.1 MG tablet Take 1 tablet by mouth 3 times daily 12/2/21   MASSIEL Barraza CNP   metFORMIN (GLUCOPHAGE) 500 MG tablet Take 1 tablet by mouth 2 times daily (with meals) 11/22/21   Laron Crisostomo MD   chlorproMAZINE (THORAZINE) 50 MG tablet Take 1 tablet by mouth 2 times daily as needed (anxiety) 11/22/21   Laron Crisostomo MD   QUEtiapine (SEROQUEL) 100 MG tablet Take 1 tablet by mouth nightly 11/22/21   Laron Crisostomo MD   sucralfate (CARAFATE) 1 GM tablet Take 1 tablet by mouth 4 times daily 11/22/21   Laron Crisostomo MD   pantoprazole (PROTONIX) 40 MG tablet Take 1 tablet by mouth every morning (before breakfast) 11/23/21   Laron Crisostomo MD   ascorbic acid (VITAMIN C) 500 MG tablet Take 1 tablet by mouth 2 times daily for 7 days 11/19/21 11/26/21  Shannon Moralez DO   gabapentin (NEURONTIN) 600 MG tablet Take 1 tablet by mouth 4 times daily for 30 days.  11/18/21 12/18/21  MASSIEL Barraza CNP   traZODone (DESYREL) 50 MG tablet Take 1 tablet by mouth nightly as needed for Sleep 11/1/21   Guera Rios MD   meloxicam (MOBIC) 7.5 MG tablet Take 1 tablet by mouth 2 times daily as needed for Pain 8/19/21 10/30/21  Sonny Espinoza, APRN - CNP       REVIEW OF SYSTEMS    (2-9 systems for level 4, 10 or more for level 5)      Review of Systems   Constitutional: Negative for chills and fever. HENT: Positive for sore throat. Negative for congestion. Respiratory: Negative for cough and shortness of breath. Cardiovascular: Negative for chest pain. Gastrointestinal: Positive for abdominal pain and diarrhea. Genitourinary: Negative for frequency and hematuria. Musculoskeletal: Positive for arthralgias (chronnic in hands). Skin: Positive for rash. Neurological: Positive for light-headedness. Negative for weakness and headaches. Psychiatric/Behavioral: Negative for hallucinations and suicidal ideas. PHYSICAL EXAM   (up to 7 for level 4, 8 or more for level 5)      INITIAL VITALS:   /87   Pulse 95   Temp 97.9 °F (36.6 °C) (Oral)   Ht 5' 8\" (1.727 m)   Wt 220 lb (99.8 kg)   SpO2 97%   BMI 33.45 kg/m²      Vitals:    12/20/21 2157   BP: 131/87   Pulse: 95   Temp: 97.9 °F (36.6 °C)   TempSrc: Oral   SpO2: 97%   Weight: 220 lb (99.8 kg)   Height: 5' 8\" (1.727 m)        Physical Exam  Vitals reviewed. Constitutional:       General: He is not in acute distress. Appearance: He is well-developed. He is not ill-appearing. HENT:      Head: Normocephalic and atraumatic. Eyes:      Extraocular Movements: Extraocular movements intact. Pupils: Pupils are equal, round, and reactive to light. Cardiovascular:      Rate and Rhythm: Normal rate and regular rhythm. Pulmonary:      Effort: Pulmonary effort is normal.      Breath sounds: Normal breath sounds. Abdominal:      General: There is no distension. Palpations: Abdomen is soft. Tenderness: There is no abdominal tenderness. Musculoskeletal:         General: Normal range of motion. Cervical back: Normal range of motion and neck supple. Skin:     General: Skin is warm and dry. Comments: No observed color change of skin as reported by patient. Neurological:      General: No focal deficit present.       Mental Status: He is alert and oriented to person, place, and time. Psychiatric:      Comments: Flat affect.        DIFFERENTIAL  DIAGNOSIS     PLAN (LABS / IMAGING / EKG):  Orders Placed This Encounter   Procedures    CBC Auto Differential    Basic Metabolic Panel w/ Reflex to MG    Hepatic Function Panel    Lipase       MEDICATIONS ORDERED:  Orders Placed This Encounter   Medications    promethazine (PHENERGAN) injection 25 mg    diphenhydrAMINE (BENADRYL) injection 12.5 mg    0.9 % sodium chloride bolus    ketorolac (TORADOL) injection 30 mg       DIAGNOSTIC RESULTS / EMERGENCY DEPARTMENT COURSE / MDM   LAB RESULTS:  Results for orders placed or performed during the hospital encounter of 12/20/21   CBC Auto Differential   Result Value Ref Range    WBC 9.2 3.5 - 11.3 k/uL    RBC 4.62 4.21 - 5.77 m/uL    Hemoglobin 13.8 13.0 - 17.0 g/dL    Hematocrit 40.7 40.7 - 50.3 %    MCV 88.1 82.6 - 102.9 fL    MCH 29.9 25.2 - 33.5 pg    MCHC 33.9 28.4 - 34.8 g/dL    RDW 13.6 11.8 - 14.4 %    Platelets 604 137 - 805 k/uL    MPV 10.3 8.1 - 13.5 fL    NRBC Automated 0.0 0.0 per 100 WBC    Differential Type NOT REPORTED     Seg Neutrophils 58 36 - 65 %    Lymphocytes 32 24 - 43 %    Monocytes 8 3 - 12 %    Eosinophils % 2 1 - 4 %    Basophils 0 0 - 2 %    Immature Granulocytes 0 0 %    Segs Absolute 5.38 1.50 - 8.10 k/uL    Absolute Lymph # 2.92 1.10 - 3.70 k/uL    Absolute Mono # 0.74 0.10 - 1.20 k/uL    Absolute Eos # 0.14 0.00 - 0.44 k/uL    Basophils Absolute 0.03 0.00 - 0.20 k/uL    Absolute Immature Granulocyte 0.03 0.00 - 0.30 k/uL    WBC Morphology NOT REPORTED     RBC Morphology NOT REPORTED     Platelet Estimate NOT REPORTED    Basic Metabolic Panel w/ Reflex to MG   Result Value Ref Range    Glucose 93 70 - 99 mg/dL    BUN 13 6 - 20 mg/dL    CREATININE 0.70 0.70 - 1.20 mg/dL    Bun/Cre Ratio NOT REPORTED 9 - 20    Calcium 9.0 8.6 - 10.4 mg/dL    Sodium 138 135 - 144 mmol/L    Potassium 3.6 (L) 3.7 - 5.3 mmol/L    Chloride 100 98 - 107 mmol/L    CO2 25 20 - 31 mmol/L    Anion Gap 13 9 - 17 mmol/L    GFR Non-African American >60 >60 mL/min    GFR African American >60 >60 mL/min    GFR Comment          GFR Staging NOT REPORTED    Hepatic Function Panel   Result Value Ref Range    Albumin 4.5 3.5 - 5.2 g/dL    Alkaline Phosphatase 73 40 - 129 U/L    ALT 11 5 - 41 U/L    AST 16 <40 U/L    Total Bilirubin 0.45 0.3 - 1.2 mg/dL    Bilirubin, Direct 0.09 <0.31 mg/dL    Bilirubin, Indirect 0.36 0.00 - 1.00 mg/dL    Total Protein 7.9 6.4 - 8.3 g/dL    Globulin NOT REPORTED 1.5 - 3.8 g/dL    Albumin/Globulin Ratio 1.3 1.0 - 2.5   Lipase   Result Value Ref Range    Lipase 31 13 - 60 U/L         RADIOLOGY:  No orders to display        EMERGENCY DEPARTMENT COURSE & MDM:    Patient coming here with multiple complaints. On examination he has a flat affect, mildly erythematous throat but heart, lungs, HEENT, skin exam unremarkable. He is phonating appropriately, no stridor, shortness of breath, tonsillar swelling, peritoneal findings, no dehydration. Per chart review he has had similar presentations in the past with largely unremarkable work-ups. Does follow with GI as an outpatient. He reports only small specks of blood in the vomit which could be due to [place] tear. He denies any black or bloody stools to me. Overall he appears well, nontoxic, vital signs normal.  Lab work was checked, no abnormalities. Patient was administered Phenergan for nausea, Benadryl for reported hand itching, Toradol for abdominal pain. Patient reevaluated and was sleeping. Abdomen soft nontender. Discharged follow-up with GI    ED Course as of 12/21/21 0238   Tue Dec 21, 2021   0034 Lab work unremarkable. Plan discharge with follow-up to gastroenterology. [CS]      ED Course User Index  [CS] Mike Hashimoto, DO         PROCEDURES:    CONSULTS:  None    CRITICAL CARE:  Please see attending note    FINAL IMPRESSION      1.  Generalized abdominal pain DISPOSITION / PLAN     DISPOSITION Decision To Discharge 12/21/2021 12:34:37 AM      PATIENT REFERRED TO:  No follow-up provider specified.     DISCHARGE MEDICATIONS:  Discharge Medication List as of 12/21/2021 12:35 AM          Ramon Manning DO  Emergency Medicine Resident    (Please note that portions of thisnote were completed with a voice recognition program.  Efforts were made to edit the dictations but occasionally words are mis-transcribed.)        Ramon Manning DO  Resident  12/21/21 3288

## 2021-12-23 ENCOUNTER — CARE COORDINATION (OUTPATIENT)
Dept: CARE COORDINATION | Age: 31
End: 2021-12-23

## 2021-12-23 NOTE — CARE COORDINATION
Pt seen in ED in [place] 6x in last wk. Primarily for c/o abd pain, diarrhea, anxiety. Was prescribed Xanax and phenergan at 12/20 ED visit. Pt continues to have high ED utilization. Has not f/u with specialists he has been referred to. I discussed this with pt today. Per pt he has appt with [facility] on 12/28. Has f/u with GI on 1/5/22. Pt was unable to tell me specialist name but informed me that he is located in [place].

## 2021-12-23 NOTE — CARE COORDINATION
Attempted to complete ED f/u. Pt has been seen in multiple ED's over last few days. Pt answered call. Was seen primarily for reports of diarrhea, abd pain. Reported anxiety at one visit. I attempted to discuss ED visits with pt. He reports that he is in car with his brother and brother's children and is unable to talk at present time. Requesting call back. Ambulatory Care Coordination  ED Follow up Call    Reason for ED visit:  Anxiety, abd pain, diarrhea  Status:     improved    Did you call your PCP prior to going to the ED? No      Did you receive a discharge instructions from the Emergency Room? Yes  Review of Instructions:     Understands what to report/when to return?:  Yes   Understands discharge instructions?:  Yes   Following discharge instructions?:  Yes   If not why? n/a    Are there any new complaints of pain? No  New Pain Meds? No    Constipation prophylaxis needed? N/A    If you have a wound is the dressing clean, dry, and intact? N/A  Understands wound care regimen? N/A    Are there any other complaints/concerns that you wish to tell your provider? Yes. Pt continues to have multiple ED visits. Has been referred to [facility] for psychiatric tx, GI for evaluation of ongoing abd pain and reports of diarrhea. Pt has not f/u. Reports that he has an appt next wk on 12/28 at [facility] and 1/5 with GI in [facility] appts/Provider:    Future Appointments   Date Time Provider Jaquan Avalos   12/27/2021  2:00 PM MASSIEL Gandhi - CNP [facility] - [address] Medications?:   Was prescribed Xanax and phenergan. Taking and reports that they are helping      Medication Reconciliation by phone - no reviewed new meds only  Understands Medications? Yes  Taking Medications? Yes  Can you swallow your pills? Yes    Any further needs in the home i.e. Equipment? No    Link to services in community?:  Yes-pt has been linked to the [facility] for behavioral health services. Since pt living in [place] have offered to try to get pt established with a local provider as it's difficult for him to get transportation to [facility]. Pt declines. Which services:  See above    Ambulatory Care Coordination Note  12/23/2021  CM Risk Score: 5  Charlson 10 Year Mortality Risk Score: 10%     ACC: Amy Brown, RN    Summary Note: Ebonie Tapia is being followed by care coordination for education and assistance in reducing ED Utilization. Pt recently was seen in ED 4x in 1 day at 3 different hospitals. Last ED visit was 12/21. Majority of visits are for diarrhea, chronic abd pain. Has been referred to GI multiple times. Has not f/u in past.  Reports that he has an appt on 1/5. Pt states \"they think I have cancer. \"  When I inquired further pt shared that he has not been seen by GI yet. Per pt diarrhea continues. Encouraged hydration and following bland diet. Suggested bananas, rice, applesauce, toast.  Reports that he is drinking good amts of fluds. Has occasional nausea. Phenergan helping. Reports anxiety-was prescribed Xanax. Pt states that medication very beneficial. Has been referred to [facility]. Has not followed up in past.  Reports appt on 12/28. Stressed need to keep this appt. Hx drug abuse-follows with med mgmt at PCP office. On Suboxone. Has appt next wk on 12/27. Reports that he has transportation to appt. Pt states \" I would rather just do a Virtual Visit. I don't understand why I need to come in for this appt. \"  Advised pt that it would be best especially since he has had multiple ED visits for health issues. 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 Unsure how long he is going to be living in [place]. Currently staying with his brother. Had long conversation with pt about appropriate utilization. Encouraged pt to utilize walk in clinics available in area when unable to get to PCP office.   Stressed importance of following up with cloNIDine (CATAPRES) 0.1 MG tablet Take 1 tablet by mouth 3 times daily 12/2/21   MASSIEL Dillon CNP   metFORMIN (GLUCOPHAGE) 500 MG tablet Take 1 tablet by mouth 2 times daily (with meals) 11/22/21   Sharon Monson MD   chlorproMAZINE (THORAZINE) 50 MG tablet Take 1 tablet by mouth 2 times daily as needed (anxiety) 11/22/21   Sharon Monson MD   sucralfate (CARAFATE) 1 GM tablet Take 1 tablet by mouth 4 times daily 11/22/21   Sharon Monson MD   pantoprazole (PROTONIX) 40 MG tablet Take 1 tablet by mouth every morning (before breakfast) 11/23/21   Sharon Monson MD   ascorbic acid (VITAMIN C) 500 MG tablet Take 1 tablet by mouth 2 times daily for 7 days 11/19/21 11/26/21  Archie Dumont DO   gabapentin (NEURONTIN) 600 MG tablet Take 1 tablet by mouth 4 times daily for 30 days.  11/18/21 12/18/21  MASSIEL Dillon CNP   traZODone (DESYREL) 50 MG tablet Take 1 tablet by mouth nightly as needed for Sleep 11/1/21   Yony Zayas MD   meloxicam [facility]) 7.5 MG tablet Take 1 tablet by mouth 2 times daily as needed for Pain 8/19/21 10/30/21  MASSIEL Dillon CNP       Future Appointments   Date Time Provider Jaquan Avalos   12/27/2021  2:00 PM MASSIEL Dillon CNP SRPX  MED [facility] - [address]

## 2021-12-27 ENCOUNTER — OFFICE VISIT (OUTPATIENT)
Dept: INTERNAL MEDICINE CLINIC | Age: 31
End: 2021-12-27
Payer: MEDICARE

## 2021-12-27 VITALS
HEIGHT: 68 IN | WEIGHT: 223.6 LBS | BODY MASS INDEX: 33.89 KG/M2 | SYSTOLIC BLOOD PRESSURE: 133 MMHG | TEMPERATURE: 98.3 F | DIASTOLIC BLOOD PRESSURE: 79 MMHG | HEART RATE: 96 BPM

## 2021-12-27 DIAGNOSIS — F11.20 SEVERE OPIOID USE DISORDER (HCC): Primary | ICD-10-CM

## 2021-12-27 DIAGNOSIS — F25.0 SCHIZOAFFECTIVE DISORDER, BIPOLAR TYPE (HCC): ICD-10-CM

## 2021-12-27 PROCEDURE — G8484 FLU IMMUNIZE NO ADMIN: HCPCS | Performed by: NURSE PRACTITIONER

## 2021-12-27 PROCEDURE — 4004F PT TOBACCO SCREEN RCVD TLK: CPT | Performed by: NURSE PRACTITIONER

## 2021-12-27 PROCEDURE — G8427 DOCREV CUR MEDS BY ELIG CLIN: HCPCS | Performed by: NURSE PRACTITIONER

## 2021-12-27 PROCEDURE — 99214 OFFICE O/P EST MOD 30 MIN: CPT | Performed by: NURSE PRACTITIONER

## 2021-12-27 PROCEDURE — 80305 DRUG TEST PRSMV DIR OPT OBS: CPT | Performed by: NURSE PRACTITIONER

## 2021-12-27 PROCEDURE — G8417 CALC BMI ABV UP PARAM F/U: HCPCS | Performed by: NURSE PRACTITIONER

## 2021-12-27 RX ORDER — SUCRALFATE 1 G/1
1 TABLET ORAL 4 TIMES DAILY
Qty: 120 TABLET | Refills: 3 | Status: SHIPPED | OUTPATIENT
Start: 2021-12-27 | End: 2022-01-03 | Stop reason: SDUPTHER

## 2021-12-27 RX ORDER — TRAZODONE HYDROCHLORIDE 50 MG/1
50 TABLET ORAL NIGHTLY PRN
Qty: 30 TABLET | Refills: 0 | Status: SHIPPED | OUTPATIENT
Start: 2021-12-27 | End: 2022-01-03 | Stop reason: SDUPTHER

## 2021-12-27 RX ORDER — GABAPENTIN 800 MG/1
800 TABLET ORAL 4 TIMES DAILY
Qty: 120 TABLET | Refills: 0 | Status: SHIPPED | OUTPATIENT
Start: 2021-12-27 | End: 2022-01-03 | Stop reason: SDUPTHER

## 2021-12-27 RX ORDER — CHLORPROMAZINE HYDROCHLORIDE 50 MG/1
50 TABLET, FILM COATED ORAL 2 TIMES DAILY PRN
Qty: 10 TABLET | Refills: 0 | Status: SHIPPED | OUTPATIENT
Start: 2021-12-27 | End: 2022-01-03 | Stop reason: SDUPTHER

## 2021-12-27 RX ORDER — PROMETHAZINE HYDROCHLORIDE 25 MG/1
25 TABLET ORAL 4 TIMES DAILY PRN
Qty: 10 TABLET | Refills: 0 | Status: SHIPPED | OUTPATIENT
Start: 2021-12-27 | End: 2022-01-03 | Stop reason: SDUPTHER

## 2021-12-27 RX ORDER — PANTOPRAZOLE SODIUM 40 MG/1
40 TABLET, DELAYED RELEASE ORAL
Qty: 30 TABLET | Refills: 3 | Status: SHIPPED | OUTPATIENT
Start: 2021-12-27 | End: 2022-01-03 | Stop reason: SDUPTHER

## 2021-12-27 RX ORDER — CLONIDINE HYDROCHLORIDE 0.1 MG/1
0.1 TABLET ORAL 3 TIMES DAILY
Qty: 90 TABLET | Refills: 0 | Status: SHIPPED | OUTPATIENT
Start: 2021-12-27 | End: 2022-01-03 | Stop reason: SDUPTHER

## 2021-12-27 RX ORDER — BUPRENORPHINE AND NALOXONE 8; 2 MG/1; MG/1
1 FILM, SOLUBLE BUCCAL; SUBLINGUAL 2 TIMES DAILY
Qty: 14 FILM | Refills: 0 | Status: SHIPPED | OUTPATIENT
Start: 2021-12-27 | End: 2022-01-03 | Stop reason: SDUPTHER

## 2021-12-27 RX ORDER — QUETIAPINE FUMARATE 100 MG/1
100 TABLET, FILM COATED ORAL NIGHTLY
Qty: 30 TABLET | Refills: 0 | Status: SHIPPED | OUTPATIENT
Start: 2021-12-27 | End: 2022-01-03 | Stop reason: SDUPTHER

## 2021-12-27 NOTE — PROGRESS NOTES
Per Verbal order of SO Torres for urine drug screen. Patient is positive for  BAR,SADAF,FEN  . Verified with results with Elisha Lubin CNP.

## 2021-12-27 NOTE — PROGRESS NOTES
[address] INTERNAL MEDICINE AND MEDICATION MANAGEMENT  [address]  Dept: [phone]  Dept Fax: [phone]: [phone]     Visit Date:  12/27/2021    Patient:  Bianca Lee  YOB: 1990    HPI:     Chief Complaint   Patient presents with    Drug Problem     Maddison Zamudio presents today for medical evaluation of severe opioid use disorder    I last seen Maddison Zamudio 4 weeks ago     He has been staying in [place] at his brother's house. Refuses to switch care to a closer provider. First urine specimen revealed buprenorphine only, however it looked like water; so required patient to provide another specimen. Second specimen revealed barbiturates, cocaine, and fentanyl. No buprenorphine. He is tearful, denies tampering with his specimen. States that the first cup fell into the toilet and that he has been taking his suboxone as prescribed. Oral swab obtained. He admits to using on Josy.      Discussed at length with patient medication refills from several providers. Call received from the board of pharmacy that there is concern that he may be doctor shopping. Maddison Zamudio has been homeless, in and out of several rehabilitation facilities. It appears this is where the abundance of prescriptions have come from. When reviewing OARRS there is no evidence of overprescribing however. Explained that if he has controlled medications prescribed by another provider again, I will no longer prescribe. Understanding voiced.     Is not active in counseling currently     Urges and cravings not well controlled with Suboxone 12 mg daily reportedly. He would like to increase to 8 mg BID.      Nausea controlled with PRN phenergan. He has had several ER visits for nausea and abdominal pain, however does not stay in one place long enough to follow with GI routinely. He is requesting xanax.  States that a  In [place] put him on it and it helped his anxiety significantly. Explained at length that this is again inappropriate behavior. Medications    Current Outpatient Medications:     ALPRAZolam (XANAX) 0.5 MG tablet, Take 1 tablet by mouth nightly as needed for Sleep for up to 30 days. , Disp: 10 tablet, Rfl: 0    buprenorphine-naloxone (SUBOXONE) 8-2 MG FILM SL film, Place 1 Film under the tongue 2 times daily for 7 days. , Disp: 14 Film, Rfl: 0    chlorproMAZINE (THORAZINE) 50 MG tablet, Take 1 tablet by mouth 2 times daily as needed (anxiety), Disp: 10 tablet, Rfl: 0    cloNIDine (CATAPRES) 0.1 MG tablet, Take 1 tablet by mouth 3 times daily for 7 days, Disp: 21 tablet, Rfl: 0    gabapentin (NEURONTIN) 800 MG tablet, Take 1 tablet by mouth 4 times daily for 7 days. , Disp: 28 tablet, Rfl: 0    metFORMIN (GLUCOPHAGE) 500 MG tablet, Take 1 tablet by mouth 2 times daily (with meals), Disp: 60 tablet, Rfl: 0    pantoprazole (PROTONIX) 40 MG tablet, Take 1 tablet by mouth every morning (before breakfast), Disp: 30 tablet, Rfl: 0    promethazine (PHENERGAN) 25 MG tablet, Take 1 tablet by mouth 4 times daily as needed for Nausea, Disp: 10 tablet, Rfl: 0    QUEtiapine (SEROQUEL) 100 MG tablet, Take 1 tablet by mouth nightly for 7 days, Disp: 7 tablet, Rfl: 0    sucralfate (CARAFATE) 1 GM tablet, Take 1 tablet by mouth 4 times daily, Disp: 120 tablet, Rfl: 3    traZODone (DESYREL) 50 MG tablet, Take 1 tablet by mouth nightly as needed for Sleep, Disp: 30 tablet, Rfl: 0    ascorbic acid (VITAMIN C) 500 MG tablet, Take 1 tablet by mouth 2 times daily for 7 days, Disp: 14 tablet, Rfl: 0    The patient is allergic to dicyclomine, famotidine, geodon [ziprasidone hcl], haloperidol, iv dye [iodides], olanzapine, reglan [metoclopramide], ibuprofen, aspirin, dicyclomine hcl, hydroxyzine hcl, iodine, metronidazole, mirtazapine, and ziprasidone.     Past Medical History  Snageeta Mixer  has a past medical history of Anxiety, Arthritis, Asthma, Bipolar I disorder, most recent episode (or current) depressed, unspecified, Clostridium difficile infection, COPD (chronic obstructive pulmonary disease) ([address] Depression, Disease of blood and blood forming organ, Eczema, Fracture, metacarpal, Gastric ulcer, Gastritis, GERD (gastroesophageal reflux disease), GI bleed, H. pylori infection, H/O blood clots, Head injury, Headache, Insomnia, Juvenile rheumatoid arthritis ([address] Neuromuscular disorder (HCC), PFAPA syndrome ([address] PUD (peptic ulcer disease), Rheumatoid arthritis ([address] Rheumatoid arthritis(714.0), Severe recurrent major depression without psychotic features ([address] Sleep apnea, 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[address] Substance abuse ([facility] Suicidal ideation, Suicide attempt by hanging ([facility] Tobacco dependence, Ulcerative colitis ([address] and UTI (urinary tract infection). Past Surgical History  The patient  has a past surgical history that includes Colonoscopy; bronchoscopy; other surgical history; Upper gastrointestinal endoscopy (2/4/16); pr egd transoral biopsy single/multiple (N/A, 3/20/2017); sigmoidoscopy (N/A, 3/20/2017); Cholecystectomy, laparoscopic (07/14/2017); pr esophagogastroduodenoscopy transoral diagnostic (N/A, 8/9/2017); pr colonoscopy w/biopsy single/multiple (8/9/2017); Abdomen surgery; Upper gastrointestinal endoscopy (N/A, 6/13/2018); Upper gastrointestinal endoscopy (N/A, 9/20/2018); and Endoscopy, colon, diagnostic. Family History  This patient's family history includes Alcohol Abuse in his father; Arthritis in his father and mother; Colon Cancer (age of onset: 43) in his paternal cousin; Depression in his brother and father; Diabetes in his brother and father; High Blood Pressure in his father; Mental Illness in his brother; Migraines in his brother, father, mother, and sister; Other in his brother, brother, father, mother, and sister; Stroke in an other family member. Social History  Vin Alaniz  reports that he has been smoking cigarettes.  He has a 7.50 pack-year smoking history. He has never used smokeless tobacco. He reports current alcohol use. He reports current drug use. Drugs: Opiates  and Cocaine. Health Maintenance:    Health Maintenance   Topic Date Due    Varicella vaccine (1 of 2 - 2-dose childhood series) Never done    COVID-19 Vaccine (1) Never done    Depression Monitoring  Never done    DTaP/Tdap/Td vaccine (1 - Tdap) Never done    A1C test (Diabetic or Prediabetic)  03/07/2020    Flu vaccine (1) 09/01/2021    Pneumococcal 0-64 years Vaccine (2 of 2 - PPSV23) 10/20/2055    Hepatitis C screen  Completed    HIV screen  Completed    Hepatitis A vaccine  Aged Out    Hepatitis B vaccine  Aged Out    Hib vaccine  Aged Out    Meningococcal (ACWY) vaccine  Aged Out       Subjective:      Review of Systems   Cardiovascular: Negative for palpitations. Neurological: Positive for headaches. Negative for dizziness. Psychiatric/Behavioral: Negative for dysphoric mood and suicidal ideas. The patient is nervous/anxious. All other systems reviewed and are negative. Objective:     /79 (Site: Left Wrist, Position: Sitting, Cuff Size: Medium Adult)   Pulse 96   Temp 98.3 °F (36.8 °C) (Temporal)   Ht 5' 8\" (1.727 m)   Wt 223 lb 9.6 oz (101.4 kg)   BMI 34.00 kg/m²     Physical Exam  Vitals reviewed. Constitutional:       General: He is not in acute distress. Appearance: Normal appearance. He is not ill-appearing. HENT:      Head: Normocephalic and atraumatic. Right Ear: External ear normal.      Left Ear: External ear normal.      Nose: Nose normal. No congestion or rhinorrhea. Mouth/Throat:      Mouth: Mucous membranes are moist.   Eyes:      Extraocular Movements: Extraocular movements intact. Conjunctiva/sclera: Conjunctivae normal.      Pupils: Pupils are equal, round, and reactive to light. Pulmonary:      Effort: Pulmonary effort is normal. No respiratory distress.    Musculoskeletal: General: Normal range of motion. Cervical back: Normal range of motion and neck supple. Right lower leg: No edema. Left lower leg: No edema. Skin:     General: Skin is warm and dry. Neurological:      General: No focal deficit present. Mental Status: He is alert and oriented to person, place, and time. Psychiatric:         Mood and Affect: Mood normal. Affect is tearful. Behavior: Behavior normal.         Thought Content: Thought content is paranoid. Judgment: Judgment normal.         Labs Reviewed 12/27/2021:    Lab Results   Component Value Date    WBC 9.2 12/20/2021    HGB 13.8 12/20/2021    HCT 40.7 12/20/2021     12/20/2021    CHOL 205 (H) 02/21/2017    TRIG 189 (H) 02/21/2017    HDL 44 02/21/2017    ALT 11 12/20/2021    AST 16 12/20/2021     12/20/2021    K 3.6 (L) 12/20/2021     12/20/2021    CREATININE 0.70 12/20/2021    BUN 13 12/20/2021    CO2 25 12/20/2021    TSH 0.59 03/28/2018    INR 1.0 05/09/2018    LABA1C 5.7 10/23/2014       Assessment/Plan      1. Severe opioid use disorder (HCC)    - POCT Rapid Drug Screen  - Suboxone 8 mg BID  - Narcan at home  - OARRS reviewed, no discrepancies  - Attend NA/AA meetings and/or arrange for individualized counseling    - Send urine for comprehensive analysis as well as oral swab    2. Schizoaffective disorder, bipolar type [facility])    - External Referral To Psychiatry      Return in about 1 week (around 1/3/2022). Patient given educational materials - see patient instructions. Discussed use, benefit, and side effects of prescribed medications. All patient questions answered. Pt voiced understanding. Reviewed health maintenance.        Electronically signed MASSIEL Paredes CNP on 12/27/21 at 2:27 PM EST

## 2021-12-28 ENCOUNTER — HOSPITAL ENCOUNTER (EMERGENCY)
Age: 31
Discharge: LEFT BEFORE DISCHARGE | End: 2021-12-28

## 2022-01-01 NOTE — CARE COORDINATION
Attempted to reach [place] today for f/u. No answer. Message left to return call.
(2) good, crying

## 2022-01-03 ENCOUNTER — TELEPHONE (OUTPATIENT)
Dept: INTERNAL MEDICINE CLINIC | Age: 32
End: 2022-01-03

## 2022-01-03 ENCOUNTER — CLINICAL DOCUMENTATION (OUTPATIENT)
Dept: INTERNAL MEDICINE CLINIC | Age: 32
End: 2022-01-03

## 2022-01-03 DIAGNOSIS — F11.20 SEVERE OPIOID USE DISORDER (HCC): ICD-10-CM

## 2022-01-03 DIAGNOSIS — R19.7 DIARRHEA, UNSPECIFIED TYPE: ICD-10-CM

## 2022-01-03 DIAGNOSIS — F25.0 SCHIZOAFFECTIVE DISORDER, BIPOLAR TYPE (HCC): ICD-10-CM

## 2022-01-03 DIAGNOSIS — K29.00 OTHER ACUTE GASTRITIS WITHOUT HEMORRHAGE: ICD-10-CM

## 2022-01-03 RX ORDER — CHLORPROMAZINE HYDROCHLORIDE 50 MG/1
50 TABLET, FILM COATED ORAL 2 TIMES DAILY PRN
Qty: 10 TABLET | Refills: 0 | Status: ON HOLD | OUTPATIENT
Start: 2022-01-03 | End: 2022-04-06

## 2022-01-03 RX ORDER — PROMETHAZINE HYDROCHLORIDE 25 MG/1
25 TABLET ORAL 4 TIMES DAILY PRN
Qty: 10 TABLET | Refills: 0 | Status: ON HOLD | OUTPATIENT
Start: 2022-01-03 | End: 2022-01-10 | Stop reason: HOSPADM

## 2022-01-03 RX ORDER — ALPRAZOLAM 0.5 MG/1
0.5 TABLET ORAL NIGHTLY PRN
Qty: 7 TABLET | Refills: 0 | Status: CANCELLED | OUTPATIENT
Start: 2022-01-03 | End: 2022-01-10

## 2022-01-03 RX ORDER — TRAZODONE HYDROCHLORIDE 50 MG/1
50 TABLET ORAL NIGHTLY PRN
Qty: 30 TABLET | Refills: 0 | Status: ON HOLD | OUTPATIENT
Start: 2022-01-03 | End: 2022-04-08 | Stop reason: SDUPTHER

## 2022-01-03 RX ORDER — SUCRALFATE 1 G/1
1 TABLET ORAL 4 TIMES DAILY
Qty: 120 TABLET | Refills: 3 | Status: ON HOLD | OUTPATIENT
Start: 2022-01-03 | End: 2022-04-06

## 2022-01-03 RX ORDER — GABAPENTIN 800 MG/1
800 TABLET ORAL 4 TIMES DAILY
Qty: 28 TABLET | Refills: 0 | Status: ON HOLD | OUTPATIENT
Start: 2022-01-03 | End: 2022-04-08 | Stop reason: HOSPADM

## 2022-01-03 RX ORDER — PANTOPRAZOLE SODIUM 40 MG/1
40 TABLET, DELAYED RELEASE ORAL
Qty: 30 TABLET | Refills: 0 | Status: ON HOLD | OUTPATIENT
Start: 2022-01-03 | End: 2022-01-10 | Stop reason: SDUPTHER

## 2022-01-03 RX ORDER — BUPRENORPHINE AND NALOXONE 8; 2 MG/1; MG/1
FILM, SOLUBLE BUCCAL; SUBLINGUAL
Qty: 14 FILM | OUTPATIENT
Start: 2022-01-03

## 2022-01-03 RX ORDER — CLONIDINE HYDROCHLORIDE 0.1 MG/1
0.1 TABLET ORAL 3 TIMES DAILY
Qty: 21 TABLET | Refills: 0 | Status: ON HOLD | OUTPATIENT
Start: 2022-01-03 | End: 2022-01-10 | Stop reason: HOSPADM

## 2022-01-03 RX ORDER — BUPRENORPHINE AND NALOXONE 8; 2 MG/1; MG/1
1 FILM, SOLUBLE BUCCAL; SUBLINGUAL 2 TIMES DAILY
Qty: 14 FILM | Refills: 0 | Status: SHIPPED | OUTPATIENT
Start: 2022-01-03 | End: 2022-01-10

## 2022-01-03 RX ORDER — QUETIAPINE FUMARATE 100 MG/1
100 TABLET, FILM COATED ORAL NIGHTLY
Qty: 7 TABLET | Refills: 0 | Status: ON HOLD | OUTPATIENT
Start: 2022-01-03 | End: 2022-01-10 | Stop reason: HOSPADM

## 2022-01-03 NOTE — TELEPHONE ENCOUNTER
Patient is living in [facility]. Having troubles getting to appointments. There have also been inconsistencies with urine specimens, as documented in chart. He has multiple prescribers of medications. I explained to him at last visit , that if he could not make appointments he would need to find a new provider. Discharge letter to be sent.

## 2022-01-03 NOTE — PROGRESS NOTES
Melody was asked by Juan Gatica CNP to provide patient with providers that were closer to him. Sw searched for providers that would be the quickest to get into for his MAT treatment. Sw provided patient with information for the following:  Maddie Gillespie  [facility] [place] [facility]. Sw reminded patient that if he needed further assistance to call sw. Patient voiced understanding and was thankful for the assistance.
straight cane or rolling walker/needs device

## 2022-01-03 NOTE — TELEPHONE ENCOUNTER
Pt can not make it to his appointment, I let Merari Kimble know she stated if pt cant make it to his appointment he will need to fine somewhere closer.  I let pt know and he voiced he understood, a week worth of meds will be sent in for the pt so he has time to fine another provider

## 2022-01-04 ENCOUNTER — CARE COORDINATION (OUTPATIENT)
Dept: CARE COORDINATION | Age: 32
End: 2022-01-04

## 2022-01-04 ENCOUNTER — HOSPITAL ENCOUNTER (EMERGENCY)
Age: 32
Discharge: HOME | End: 2022-01-04
Attending: EMERGENCY MEDICINE
Payer: MEDICARE

## 2022-01-04 VITALS
DIASTOLIC BLOOD PRESSURE: 83 MMHG | SYSTOLIC BLOOD PRESSURE: 142 MMHG | OXYGEN SATURATION: 97 % | BODY MASS INDEX: 32.58 KG/M2 | WEIGHT: 220 LBS | RESPIRATION RATE: 16 BRPM | TEMPERATURE: 98.1 F | HEART RATE: 98 BPM | HEIGHT: 69 IN

## 2022-01-04 DIAGNOSIS — F41.9 ANXIETY: Primary | ICD-10-CM

## 2022-01-04 PROCEDURE — 99283 EMERGENCY DEPT VISIT LOW MDM: CPT

## 2022-01-04 PROCEDURE — 6370000000 HC RX 637 (ALT 250 FOR IP): Performed by: EMERGENCY MEDICINE

## 2022-01-04 RX ORDER — ARIPIPRAZOLE LAUROXIL 1064 MG/3.9ML
INJECTION, SUSPENSION, EXTENDED RELEASE INTRAMUSCULAR
Status: ON HOLD | COMMUNITY
End: 2022-01-07

## 2022-01-04 RX ORDER — ALPRAZOLAM 0.25 MG/1
0.5 TABLET ORAL ONCE
Status: COMPLETED | OUTPATIENT
Start: 2022-01-04 | End: 2022-01-04

## 2022-01-04 RX ORDER — IBUPROFEN 800 MG/1
TABLET ORAL
Status: ON HOLD | COMMUNITY
Start: 2021-12-28 | End: 2022-01-07

## 2022-01-04 RX ORDER — CHLORPROMAZINE HYDROCHLORIDE 25 MG/1
TABLET, FILM COATED ORAL
COMMUNITY
Start: 2021-12-27 | End: 2022-01-07 | Stop reason: DRUGHIGH

## 2022-01-04 RX ORDER — TIZANIDINE 4 MG/1
TABLET ORAL
Status: ON HOLD | COMMUNITY
Start: 2021-12-10 | End: 2022-01-07

## 2022-01-04 RX ORDER — ONDANSETRON 4 MG/1
TABLET, FILM COATED ORAL
COMMUNITY
Start: 2021-12-28 | End: 2022-01-07

## 2022-01-04 RX ORDER — ALPRAZOLAM 0.25 MG/1
0.25 TABLET ORAL 3 TIMES DAILY PRN
Qty: 10 TABLET | Refills: 0 | Status: ON HOLD | OUTPATIENT
Start: 2022-01-04 | End: 2022-01-10 | Stop reason: HOSPADM

## 2022-01-04 RX ADMIN — ALPRAZOLAM 0.5 MG: 0.25 TABLET ORAL at 20:44

## 2022-01-04 ASSESSMENT — ENCOUNTER SYMPTOMS
VOMITING: 0
CONSTIPATION: 0
COLOR CHANGE: 0
EYE REDNESS: 0
STRIDOR: 0
EYE PAIN: 0
EYE DISCHARGE: 0
NAUSEA: 0
DIARRHEA: 0
ABDOMINAL PAIN: 0
COUGH: 0
SORE THROAT: 0
SHORTNESS OF BREATH: 0
WHEEZING: 0

## 2022-01-04 ASSESSMENT — PAIN DESCRIPTION - LOCATION: LOCATION: HEAD

## 2022-01-04 ASSESSMENT — PAIN SCALES - GENERAL: PAINLEVEL_OUTOF10: 7

## 2022-01-05 NOTE — ED NOTES
Pt given gingerale, saltine crackers, and ana crackers.      Gita Mendez, [facility]  01/04/22 210

## 2022-01-05 NOTE — ED NOTES
Pt to ED via private auto. Pt c/o \"anxiety, just not feeling right, and feeling like I can't concentrate\"  Pt reports needing a PCP. Pt reports he will call one tomorrow. Pt A&Ox4, ambulatory.      [facility]  01/04/22 2101

## 2022-01-05 NOTE — ED PROVIDER NOTES
[facility] ED  EMERGENCY DEPARTMENT ENCOUNTER      Pt Name: Jill Nieves  MRN: [id number]  [place] 1990  Date of evaluation: 1/4/2022  Provider: Sherryle Knapp, MD    CHIEF COMPLAINT       Chief Complaint   Patient presents with    Anxiety       HISTORY OF PRESENT ILLNESS  (Location/Symptom, Timing/Onset, Context/Setting, Quality, Duration, Modifying Factors, Severity.)   Jill Nieves is a 32 y.o. male who presents to the emergency department complaining of anxiety with his typical symptoms of anxiety attack. He relates he does have some left-sided chest pain and that is pretty typical for him. He feels like his heart races and he gets a headache and breaks out in a sweat. He relates the last things have been they did give him a small amount of Xanax which really helped and he was supposed to follow-up with a family physician which he admits to me that he did not do. He relates he is hoping we can do this again today and he is telling me that he will infect follow-up with a family doctor this time. Relates that he really does not feel like he needs a cardiac work-up here in the emergency department and really does not want that. I did relate to him that I will give him a short prescription but this would not be something that we do on a regular basis in the emergency department. He verbalizes understanding. I have answered any questions he has at this time. Nursing Notes were reviewed. REVIEW OF SYSTEMS    (2-9 systems for level 4, 10 or more for level 5)     Review of Systems   Constitutional: Positive for diaphoresis. Negative for activity change, appetite change, chills, fatigue and fever. HENT: Negative for congestion, ear pain and sore throat. Eyes: Negative for pain, discharge and redness. Respiratory: Negative for cough, shortness of breath, wheezing and stridor. Cardiovascular: Positive for chest pain and palpitations.    Gastrointestinal: Negative for abdominal pain, constipation, diarrhea, nausea and vomiting. Genitourinary: Negative for decreased urine volume and difficulty urinating. Musculoskeletal: Negative for arthralgias and myalgias. Skin: Negative for color change and rash. Neurological: Positive for headaches. Negative for dizziness and weakness. Psychiatric/Behavioral: Negative for behavioral problems and confusion. The patient is nervous/anxious. Except as noted above the remainder of the review of systems was reviewed and negative.        PAST MEDICAL HISTORY     Past Medical History:   Diagnosis Date    Anxiety     Arthritis     Asthma     Bipolar I disorder, most recent episode (or current) depressed, unspecified 9/12/2014    Clostridium difficile infection     COPD (chronic obstructive pulmonary disease) ([address]     Depression     Disease of blood and blood forming organ     Eczema     Fracture, metacarpal     R 4th and 5th    Gastric ulcer     Gastritis 06/13/2018    GERD (gastroesophageal reflux disease)     GI bleed     H. 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[address]     Substance abuse ([address]     Hx of opiates, benzos, cocaine, alcohol abuse    Suicidal ideation     Suicide attempt by hanging ([address]     Tobacco dependence     Ulcerative colitis ([address]     UTI (urinary tract infection)        SURGICAL HISTORY       Past Surgical History:   Procedure Laterality Date    ABDOMEN SURGERY      upper GI scope 7/7/2015    BRONCHOSCOPY      CHOLECYSTECTOMY, LAPAROSCOPIC  07/14/2017    surgery performed at [address] [place] [address], COLON, DIAGNOSTIC      OTHER SURGICAL HISTORY      lumbar puncture    IA COLONOSCOPY W/BIOPSY SINGLE/MULTIPLE  8/9/2017    COLONOSCOPY WITH BIOPSY performed by Ronald Howard MD at [facility]    IA EGD TRANSORAL BIOPSY SINGLE/MULTIPLE N/A 3/20/2017    EGD BIOPSY performed by Mikaela Brar MD at [facility]    IA ESOPHAGOGASTRODUODENOSCOPY TRANSORAL DIAGNOSTIC N/A 8/9/2017    EGD ESOPHAGOGASTRODUODENOSCOPY performed by Ronald Howard MD at [address] N/A 3/20/2017    SIGMOIDOSCOPY DIAGNOSTIC FLEXIBLE [facility]  2/4/16    UPPER GASTROINTESTINAL ENDOSCOPY N/A 6/13/2018    GASTRITIS    UPPER GASTROINTESTINAL ENDOSCOPY N/A 9/20/2018    EGD BIOPSY performed by Liang Shah MD at [facility]       Previous Medications    ALPRAZOLAM (XANAX) 0.5 MG TABLET    Take 1 tablet by mouth nightly as needed for Sleep for up to 30 days. ARIPIPRAZOLE LAUROXIL (ARISTADA) 1064 MG/3.9ML PRSY INJECTION    Aristada 1,064 mg/3.9 mL suspension, extend.rel. IM syringe    ASCORBIC ACID (VITAMIN C) 500 MG TABLET    Take 1 tablet by mouth 2 times daily for 7 days    BUPRENORPHINE-NALOXONE (SUBOXONE) 8-2 MG FILM SL FILM    Place 1 Film under the tongue 2 times daily for 7 days. CHLORPROMAZINE (THORAZINE) 25 MG TABLET        CHLORPROMAZINE (THORAZINE) 50 MG TABLET    Take 1 tablet by mouth 2 times daily as needed (anxiety)    CLONIDINE (CATAPRES) 0.1 MG TABLET    Take 1 tablet by mouth 3 times daily for 7 days    GABAPENTIN (NEURONTIN) 800 MG TABLET    Take 1 tablet by mouth 4 times daily for 7 days.     IBUPROFEN (ADVIL;MOTRIN) 800 MG TABLET        METFORMIN (GLUCOPHAGE) 500 MG TABLET    Take 1 tablet by mouth 2 times daily (with meals)    ONDANSETRON (ZOFRAN) 4 MG TABLET        PANTOPRAZOLE (PROTONIX) 40 MG TABLET    Take 1 tablet by mouth every morning (before breakfast)    PROMETHAZINE (PHENERGAN) 25 MG TABLET    Take 1 tablet by mouth 4 times daily as needed for Nausea    QUETIAPINE (SEROQUEL) 100 MG TABLET    Take 1 tablet by mouth nightly for 7 days    SUCRALFATE (CARAFATE) 1 GM TABLET    Take 1 tablet by mouth 4 times daily    TIZANIDINE (ZANAFLEX) 4 MG TABLET        TRAZODONE (DESYREL) 50 MG TABLET    Take 1 tablet by mouth nightly as needed for Sleep       ALLERGIES     Dicyclomine, Famotidine, Geodon [ziprasidone hcl], Haloperidol, Iv dye [iodides], Olanzapine, Reglan [metoclopramide], Ibuprofen, Aspirin, Dicyclomine hcl, Hydroxyzine hcl, Iodine, Metronidazole, Mirtazapine, and Ziprasidone    FAMILY HISTORY           Problem Relation Age of Onset    Diabetes Father     Alcohol Abuse Father     Depression Father     Arthritis Father     High Blood Pressure Father     Other Father         aneurysm & epilepsy    Migraines Father     Arthritis Mother     Other Mother         aneurysm & epilepsy    Migraines Mother     Diabetes Brother         Aunt and uncles    Depression Brother     Mental Illness Brother     Other Brother         epilepsy    Migraines Brother     Stroke Other         Uncle    Other Brother         murdered Oct 6th, 2014    Colon Cancer Paternal Cousin 43    Other Sister         epilepsy    Migraines Sister      Family Status   Relation Name Status    Father  Alive    Mother  Alive    Brother  (Not Specified)    Other  (Not Specified)    Brother  (Not Specified)    PCousin  (Not Specified)    Sister  (Not Specified)        SOCIAL HISTORY      reports that he has been smoking cigarettes. He has a 7.50 pack-year smoking history. He has never used smokeless tobacco. He reports current alcohol use. He reports current drug use. Drugs: Opiates  and Cocaine.     PHYSICAL EXAM    (up to 7 for level 4, 8 or more for level 5)     ED Triage Vitals [01/04/22 1926]   BP Temp Temp Source Pulse Resp SpO2 Height Weight   (!) 142/83 98.1 °F (36.7 °C) Oral 98 16 97 % 5' 9\" (1.753 m) 220 lb (99.8 kg)     Physical Exam  Vitals and nursing note reviewed. Constitutional:       General: He is not in acute distress. Appearance: He is well-developed. He is not ill-appearing, toxic-appearing or diaphoretic. HENT:      Head: Normocephalic and atraumatic. Right Ear: External ear normal.      Left Ear: External ear normal.      Nose: Nose normal.      Mouth/Throat:      Mouth: Mucous membranes are moist.   Eyes:      General:         Right eye: No discharge. Left eye: No discharge. Conjunctiva/sclera: Conjunctivae normal.      Pupils: Pupils are equal, round, and reactive to light. Cardiovascular:      Rate and Rhythm: Regular rhythm. Tachycardia present. Heart sounds: Normal heart sounds. No murmur heard. Pulmonary:      Effort: Pulmonary effort is normal. No respiratory distress. Breath sounds: Normal breath sounds. No wheezing, rhonchi or rales. Chest:      Chest wall: No tenderness. Abdominal:      General: Bowel sounds are normal. There is no distension. Palpations: Abdomen is soft. There is no mass. Tenderness: There is no abdominal tenderness. There is no guarding or rebound. Musculoskeletal:         General: Normal range of motion. Cervical back: Normal range of motion and neck supple. Right lower leg: No edema. Left lower leg: No edema. Skin:     General: Skin is warm. Findings: No rash. Neurological:      General: No focal deficit present. Mental Status: He is alert and oriented to person, place, and time. Motor: No abnormal muscle tone. Psychiatric:         Attention and Perception: Attention normal.         Mood and Affect: Mood is anxious. Speech: Speech normal.         Behavior: Behavior normal.         Thought Content:  Thought content normal.         DIAGNOSTIC RESULTS     EKG: All EKG's are interpreted by the Emergency Department Physician who either signs or Co-signs this chart in the absence of a cardiologist.    none    RADIOLOGY:   Non-plain film images such as CT, Ultrasound and MRI are read by the radiologist. Plain radiographic images are visualized and preliminarily interpreted by the emergency physician with the below findings:    Interpretation per the Radiologist below, if available at the time of this note:    No orders to display         ED BEDSIDE ULTRASOUND:   Performed by ED Physician - none    LABS:  Labs Reviewed - No data to display    All other labs were within normal range or not returned as of this dictation. EMERGENCY DEPARTMENT COURSE and DIFFERENTIAL DIAGNOSIS/MDM:   Vitals:    Vitals:    01/04/22 1926   BP: (!) 142/83   Pulse: 98   Resp: 16   Temp: 98.1 °F (36.7 °C)   TempSrc: Oral   SpO2: 97%   Weight: 220 lb (99.8 kg)   Height: 5' 9\" (1.753 m)     He does relate to me that he is comfortable with just medication and no work-up. He would prefer that actually as he has had work-ups in the past and it has generally been anxiety based he tells me. I think this is reasonable. We did discuss follow-up with PCP as this would be a definitive method to control anxiety in a much better way than visiting the ED regularly. However we also did discuss what to return the emergency department for as well. CONSULTS:  None    PROCEDURES:  None    FINAL IMPRESSION      1. Anxiety          DISPOSITION/PLAN   DISPOSITION Decision To Discharge 01/04/2022 08:43:25 PM      PATIENT REFERRED TO:  PCP  Please call a PCP as we talked. DISCHARGE MEDICATIONS:  New Prescriptions    ALPRAZOLAM (XANAX) 0.25 MG TABLET    Take 1 tablet by mouth 3 times daily as needed for Anxiety for up to 30 days.        (Please note that portions of this note were completed with a voice recognition program.  Efforts were made to edit the dictations but occasionally words are mis-transcribed.)    Shawn Wilson MD  Attending Emergency Physician        Shawn Wilson MD  01/04/22 2049

## 2022-01-06 ENCOUNTER — HOSPITAL ENCOUNTER (EMERGENCY)
Age: 32
Discharge: HOME | End: 2022-01-07
Attending: EMERGENCY MEDICINE
Payer: MEDICARE

## 2022-01-06 VITALS
HEIGHT: 69 IN | BODY MASS INDEX: 32.58 KG/M2 | HEART RATE: 85 BPM | SYSTOLIC BLOOD PRESSURE: 141 MMHG | TEMPERATURE: 97.3 F | DIASTOLIC BLOOD PRESSURE: 87 MMHG | RESPIRATION RATE: 16 BRPM | OXYGEN SATURATION: 98 % | WEIGHT: 220 LBS

## 2022-01-06 DIAGNOSIS — R11.2 NON-INTRACTABLE VOMITING WITH NAUSEA, UNSPECIFIED VOMITING TYPE: Primary | ICD-10-CM

## 2022-01-06 DIAGNOSIS — R19.7 DIARRHEA, UNSPECIFIED TYPE: ICD-10-CM

## 2022-01-06 PROCEDURE — 96374 THER/PROPH/DIAG INJ IV PUSH: CPT

## 2022-01-06 PROCEDURE — 99281 EMR DPT VST MAYX REQ PHY/QHP: CPT

## 2022-01-06 RX ORDER — 0.9 % SODIUM CHLORIDE 0.9 %
1000 INTRAVENOUS SOLUTION INTRAVENOUS ONCE
Status: COMPLETED | OUTPATIENT
Start: 2022-01-07 | End: 2022-01-07

## 2022-01-06 RX ORDER — ONDANSETRON 2 MG/ML
4 INJECTION INTRAMUSCULAR; INTRAVENOUS ONCE
Status: DISCONTINUED | OUTPATIENT
Start: 2022-01-07 | End: 2022-01-07 | Stop reason: HOSPADM

## 2022-01-07 ENCOUNTER — HOSPITAL ENCOUNTER (INPATIENT)
Age: 32
LOS: 3 days | Discharge: HOME | DRG: 885 | End: 2022-01-10
Attending: EMERGENCY MEDICINE | Admitting: PSYCHIATRY & NEUROLOGY
Payer: MEDICARE

## 2022-01-07 DIAGNOSIS — F32.A DEPRESSION WITH SUICIDAL IDEATION: Primary | ICD-10-CM

## 2022-01-07 DIAGNOSIS — R45.851 DEPRESSION WITH SUICIDAL IDEATION: Primary | ICD-10-CM

## 2022-01-07 LAB
ABSOLUTE EOS #: 0.18 K/UL (ref 0–0.44)
ABSOLUTE IMMATURE GRANULOCYTE: <0.03 K/UL (ref 0–0.3)
ABSOLUTE LYMPH #: 2.44 K/UL (ref 1.1–3.7)
ABSOLUTE MONO #: 0.45 K/UL (ref 0.1–1.2)
ACETAMINOPHEN LEVEL: <5 UG/ML (ref 10–30)
ALBUMIN SERPL-MCNC: 3.7 G/DL (ref 3.5–5.2)
ALBUMIN/GLOBULIN RATIO: 1.3 (ref 1–2.5)
ALP BLD-CCNC: 61 U/L (ref 40–129)
ALT SERPL-CCNC: 8 U/L (ref 5–41)
AMPHETAMINE SCREEN URINE: NEGATIVE
ANION GAP SERPL CALCULATED.3IONS-SCNC: 9 MMOL/L (ref 9–17)
AST SERPL-CCNC: 14 U/L
BARBITURATE SCREEN URINE: POSITIVE
BASOPHILS # BLD: 1 % (ref 0–2)
BASOPHILS ABSOLUTE: 0.03 K/UL (ref 0–0.2)
BENZODIAZEPINE SCREEN, URINE: POSITIVE
BILIRUB SERPL-MCNC: <0.1 MG/DL (ref 0.3–1.2)
BILIRUBIN DIRECT: <0.08 MG/DL
BILIRUBIN, INDIRECT: ABNORMAL MG/DL (ref 0–1)
BUN BLDV-MCNC: 5 MG/DL (ref 6–20)
BUN/CREAT BLD: ABNORMAL (ref 9–20)
BUPRENORPHINE URINE: ABNORMAL
CALCIUM SERPL-MCNC: 9.1 MG/DL (ref 8.6–10.4)
CANNABINOID SCREEN URINE: NEGATIVE
CHLORIDE BLD-SCNC: 105 MMOL/L (ref 98–107)
CO2: 25 MMOL/L (ref 20–31)
COCAINE METABOLITE, URINE: POSITIVE
CREAT SERPL-MCNC: 0.82 MG/DL (ref 0.7–1.2)
DIFFERENTIAL TYPE: ABNORMAL
EOSINOPHILS RELATIVE PERCENT: 3 % (ref 1–4)
ETHANOL PERCENT: <0.01 %
ETHANOL: <10 MG/DL
GFR AFRICAN AMERICAN: >60 ML/MIN
GFR NON-AFRICAN AMERICAN: >60 ML/MIN
GFR SERPL CREATININE-BSD FRML MDRD: ABNORMAL ML/MIN/{1.73_M2}
GFR SERPL CREATININE-BSD FRML MDRD: ABNORMAL ML/MIN/{1.73_M2}
GLOBULIN: ABNORMAL G/DL (ref 1.5–3.8)
GLUCOSE BLD-MCNC: 91 MG/DL (ref 75–110)
GLUCOSE BLD-MCNC: 92 MG/DL (ref 70–99)
HCT VFR BLD CALC: 41 % (ref 40.7–50.3)
HEMOGLOBIN: 13.5 G/DL (ref 13–17)
IMMATURE GRANULOCYTES: 0 %
LIPASE: 24 U/L (ref 13–60)
LYMPHOCYTES # BLD: 45 % (ref 24–43)
MCH RBC QN AUTO: 29.5 PG (ref 25.2–33.5)
MCHC RBC AUTO-ENTMCNC: 32.9 G/DL (ref 28.4–34.8)
MCV RBC AUTO: 89.7 FL (ref 82.6–102.9)
MDMA URINE: ABNORMAL
METHADONE SCREEN, URINE: NEGATIVE
METHAMPHETAMINE, URINE: ABNORMAL
MONOCYTES # BLD: 8 % (ref 3–12)
NRBC AUTOMATED: 0 PER 100 WBC
OPIATES, URINE: NEGATIVE
OXYCODONE SCREEN URINE: NEGATIVE
PDW BLD-RTO: 13.5 % (ref 11.8–14.4)
PHENCYCLIDINE, URINE: NEGATIVE
PLATELET # BLD: 227 K/UL (ref 138–453)
PLATELET ESTIMATE: ABNORMAL
PMV BLD AUTO: 10.4 FL (ref 8.1–13.5)
POTASSIUM SERPL-SCNC: 4.2 MMOL/L (ref 3.7–5.3)
PROPOXYPHENE, URINE: ABNORMAL
RBC # BLD: 4.57 M/UL (ref 4.21–5.77)
RBC # BLD: ABNORMAL 10*6/UL
SALICYLATE LEVEL: <1 MG/DL (ref 3–10)
SARS-COV-2, RAPID: NOT DETECTED
SARS-COV-2, RAPID: NOT DETECTED
SEG NEUTROPHILS: 43 % (ref 36–65)
SEGMENTED NEUTROPHILS ABSOLUTE COUNT: 2.31 K/UL (ref 1.5–8.1)
SODIUM BLD-SCNC: 139 MMOL/L (ref 135–144)
SPECIMEN DESCRIPTION: NORMAL
SPECIMEN DESCRIPTION: NORMAL
TEST INFORMATION: ABNORMAL
TOTAL PROTEIN: 6.6 G/DL (ref 6.4–8.3)
TRICYCLIC ANTIDEPRESSANTS, UR: ABNORMAL
WBC # BLD: 5.4 K/UL (ref 3.5–11.3)
WBC # BLD: ABNORMAL 10*3/UL

## 2022-01-07 PROCEDURE — 80179 DRUG ASSAY SALICYLATE: CPT

## 2022-01-07 PROCEDURE — 80307 DRUG TEST PRSMV CHEM ANLYZR: CPT

## 2022-01-07 PROCEDURE — 6370000000 HC RX 637 (ALT 250 FOR IP): Performed by: PSYCHIATRY & NEUROLOGY

## 2022-01-07 PROCEDURE — 80143 DRUG ASSAY ACETAMINOPHEN: CPT

## 2022-01-07 PROCEDURE — 85025 COMPLETE CBC W/AUTO DIFF WBC: CPT

## 2022-01-07 PROCEDURE — 87635 SARS-COV-2 COVID-19 AMP PRB: CPT

## 2022-01-07 PROCEDURE — 6360000002 HC RX W HCPCS: Performed by: STUDENT IN AN ORGANIZED HEALTH CARE EDUCATION/TRAINING PROGRAM

## 2022-01-07 PROCEDURE — 2580000003 HC RX 258: Performed by: STUDENT IN AN ORGANIZED HEALTH CARE EDUCATION/TRAINING PROGRAM

## 2022-01-07 PROCEDURE — 99283 EMERGENCY DEPT VISIT LOW MDM: CPT

## 2022-01-07 PROCEDURE — 1240000000 HC EMOTIONAL WELLNESS R&B

## 2022-01-07 PROCEDURE — G0480 DRUG TEST DEF 1-7 CLASSES: HCPCS

## 2022-01-07 PROCEDURE — 99223 1ST HOSP IP/OBS HIGH 75: CPT | Performed by: PSYCHIATRY & NEUROLOGY

## 2022-01-07 PROCEDURE — 83690 ASSAY OF LIPASE: CPT

## 2022-01-07 PROCEDURE — 82947 ASSAY GLUCOSE BLOOD QUANT: CPT

## 2022-01-07 PROCEDURE — 80048 BASIC METABOLIC PNL TOTAL CA: CPT

## 2022-01-07 PROCEDURE — 36415 COLL VENOUS BLD VENIPUNCTURE: CPT

## 2022-01-07 PROCEDURE — 80076 HEPATIC FUNCTION PANEL: CPT

## 2022-01-07 RX ORDER — GABAPENTIN 400 MG/1
800 CAPSULE ORAL 4 TIMES DAILY
Status: DISCONTINUED | OUTPATIENT
Start: 2022-01-07 | End: 2022-01-10 | Stop reason: HOSPADM

## 2022-01-07 RX ORDER — TRAZODONE HYDROCHLORIDE 50 MG/1
50 TABLET ORAL NIGHTLY PRN
Status: DISCONTINUED | OUTPATIENT
Start: 2022-01-07 | End: 2022-01-10 | Stop reason: HOSPADM

## 2022-01-07 RX ORDER — HYDROXYZINE 50 MG/1
50 TABLET, FILM COATED ORAL 3 TIMES DAILY PRN
Status: DISCONTINUED | OUTPATIENT
Start: 2022-01-07 | End: 2022-01-10 | Stop reason: HOSPADM

## 2022-01-07 RX ORDER — SUCRALFATE 1 G/1
1 TABLET ORAL 4 TIMES DAILY
Status: DISCONTINUED | OUTPATIENT
Start: 2022-01-07 | End: 2022-01-10 | Stop reason: HOSPADM

## 2022-01-07 RX ORDER — PANTOPRAZOLE SODIUM 40 MG/1
40 TABLET, DELAYED RELEASE ORAL
Status: DISCONTINUED | OUTPATIENT
Start: 2022-01-08 | End: 2022-01-10 | Stop reason: HOSPADM

## 2022-01-07 RX ORDER — OLANZAPINE 10 MG/1
5 INJECTION, POWDER, LYOPHILIZED, FOR SOLUTION INTRAMUSCULAR 2 TIMES DAILY PRN
Status: DISCONTINUED | OUTPATIENT
Start: 2022-01-07 | End: 2022-01-10 | Stop reason: HOSPADM

## 2022-01-07 RX ORDER — PROCHLORPERAZINE EDISYLATE 5 MG/ML
10 INJECTION INTRAMUSCULAR; INTRAVENOUS ONCE
Status: COMPLETED | OUTPATIENT
Start: 2022-01-07 | End: 2022-01-07

## 2022-01-07 RX ORDER — CHLORPROMAZINE HYDROCHLORIDE 25 MG/1
50 TABLET, FILM COATED ORAL 2 TIMES DAILY PRN
Status: DISCONTINUED | OUTPATIENT
Start: 2022-01-07 | End: 2022-01-08

## 2022-01-07 RX ORDER — DIPHENHYDRAMINE HCL 25 MG
25 TABLET ORAL ONCE
Status: DISCONTINUED | OUTPATIENT
Start: 2022-01-07 | End: 2022-01-07 | Stop reason: HOSPADM

## 2022-01-07 RX ORDER — OLANZAPINE 10 MG/1
10 TABLET ORAL NIGHTLY
Status: DISCONTINUED | OUTPATIENT
Start: 2022-01-07 | End: 2022-01-08

## 2022-01-07 RX ORDER — BUPRENORPHINE AND NALOXONE 8; 2 MG/1; MG/1
1 FILM, SOLUBLE BUCCAL; SUBLINGUAL 2 TIMES DAILY
Status: DISCONTINUED | OUTPATIENT
Start: 2022-01-07 | End: 2022-01-10 | Stop reason: HOSPADM

## 2022-01-07 RX ADMIN — PROCHLORPERAZINE EDISYLATE 10 MG: 5 INJECTION INTRAMUSCULAR; INTRAVENOUS at 00:32

## 2022-01-07 RX ADMIN — NICOTINE POLACRILEX 2 MG: 2 GUM, CHEWING BUCCAL at 18:16

## 2022-01-07 RX ADMIN — SUCRALFATE 1 G: 1 TABLET ORAL at 17:06

## 2022-01-07 RX ADMIN — NICOTINE POLACRILEX 2 MG: 2 GUM, CHEWING BUCCAL at 20:04

## 2022-01-07 RX ADMIN — NICOTINE POLACRILEX 2 MG: 2 GUM, CHEWING BUCCAL at 17:13

## 2022-01-07 RX ADMIN — BUPRENORPHINE AND NALOXONE 1 FILM: 8; 2 FILM BUCCAL; SUBLINGUAL at 21:07

## 2022-01-07 RX ADMIN — GABAPENTIN 800 MG: 400 CAPSULE ORAL at 17:06

## 2022-01-07 RX ADMIN — NICOTINE POLACRILEX 2 MG: 2 GUM, CHEWING BUCCAL at 21:07

## 2022-01-07 RX ADMIN — SUCRALFATE 1 G: 1 TABLET ORAL at 21:07

## 2022-01-07 RX ADMIN — GABAPENTIN 800 MG: 400 CAPSULE ORAL at 21:07

## 2022-01-07 RX ADMIN — BUPRENORPHINE AND NALOXONE 1 FILM: 8; 2 FILM BUCCAL; SUBLINGUAL at 15:38

## 2022-01-07 RX ADMIN — NICOTINE POLACRILEX 2 MG: 2 GUM, CHEWING BUCCAL at 21:55

## 2022-01-07 RX ADMIN — SODIUM CHLORIDE 1000 ML: 9 INJECTION, SOLUTION INTRAVENOUS at 00:06

## 2022-01-07 RX ADMIN — TRAZODONE HYDROCHLORIDE 50 MG: 50 TABLET ORAL at 21:07

## 2022-01-07 RX ADMIN — OLANZAPINE 10 MG: 10 TABLET, FILM COATED ORAL at 21:07

## 2022-01-07 RX ADMIN — METFORMIN HYDROCHLORIDE 500 MG: 500 TABLET ORAL at 17:06

## 2022-01-07 ASSESSMENT — ENCOUNTER SYMPTOMS
BLOOD IN STOOL: 1
DIARRHEA: 1
CONSTIPATION: 0
NAUSEA: 1
COUGH: 0
BACK PAIN: 0
VOMITING: 1
RHINORRHEA: 0
ABDOMINAL PAIN: 0
BACK PAIN: 0
COUGH: 1
SHORTNESS OF BREATH: 0
ABDOMINAL PAIN: 1

## 2022-01-07 ASSESSMENT — PAIN SCALES - GENERAL: PAINLEVEL_OUTOF10: 0

## 2022-01-07 ASSESSMENT — LIFESTYLE VARIABLES: HISTORY_ALCOHOL_USE: NO

## 2022-01-07 ASSESSMENT — SLEEP AND FATIGUE QUESTIONNAIRES
DIFFICULTY ARISING: NO
AVERAGE NUMBER OF SLEEP HOURS: 6
DIFFICULTY FALLING ASLEEP: YES
SLEEP PATTERN: DIFFICULTY FALLING ASLEEP
DO YOU HAVE DIFFICULTY SLEEPING: YES
DO YOU USE A SLEEP AID: NO
RESTFUL SLEEP: NO
DIFFICULTY STAYING ASLEEP: YES

## 2022-01-07 ASSESSMENT — PATIENT HEALTH QUESTIONNAIRE - PHQ9: SUM OF ALL RESPONSES TO PHQ QUESTIONS 1-9: 8
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
## 2022-01-07 NOTE — ED PROVIDER NOTES
[facility] ED     Emergency Department     Faculty Attestation        I performed a history and physical examination of the patient and discussed management with the resident. I reviewed the residents note and agree with the documented findings and plan of care. Any areas of disagreement are noted on the chart. I was personally present for the key portions of any procedures. I have documented in the chart those procedures where I was not present during the key portions. I have reviewed the emergency nurses triage note. I agree with the chief complaint, past medical history, past surgical history, allergies, medications, social and family history as documented unless otherwise noted below. For mid-level providers such as nurse practitioners as well as physicians assistants:    I have personally seen and evaluated the patient. I find the patient's history and physical exam are consistent with NP/PA documentation. I agree with the care provided, treatment rendered, disposition, & follow-up plan. Additional findings are as noted.     Vital Signs: BP (!) 141/87   Pulse 85   Temp 97.3 °F (36.3 °C) (Oral)   Resp 16   Ht 5' 9\" (1.753 m)   Wt 220 lb (99.8 kg)   SpO2 98%   BMI 32.49 kg/m²   PCP:  Cleveland Cormier, APRN - CNP    Pertinent Comments:           Critical Care  None          Guillermina Lassiter MD    Attending Emergency Medicine Physician              Lola Rodrigues MD  01/07/22 3655

## 2022-01-07 NOTE — ED NOTES
Bed: 09  Expected date:   Expected time:   Means of arrival:   Comments:     Jon Hernandez RN  01/06/22 6300

## 2022-01-07 NOTE — ED PROVIDER NOTES
ulcer disease), Rheumatoid arthritis ([facility] Rheumatoid arthritis(714.0), Severe recurrent major depression without psychotic features ([facility] Sleep apnea, 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[facility] Substance abuse ([facility] Suicidal ideation, Suicide attempt by hanging ([facility] Tobacco dependence, Ulcerative colitis ([facility] and UTI (urinary tract infection). has a past surgical history that includes Colonoscopy; bronchoscopy; other surgical history; Upper gastrointestinal endoscopy (2/4/16); pr egd transoral biopsy single/multiple (N/A, 3/20/2017); sigmoidoscopy (N/A, 3/20/2017); Cholecystectomy, laparoscopic (07/14/2017); pr esophagogastroduodenoscopy transoral diagnostic (N/A, 8/9/2017); pr colonoscopy w/biopsy single/multiple (8/9/2017); Abdomen surgery; Upper gastrointestinal endoscopy (N/A, 6/13/2018); Upper gastrointestinal endoscopy (N/A, 9/20/2018); and Endoscopy, colon, diagnostic. Social History     Socioeconomic History    Marital status: Single     Spouse name: Not on file    Number of children: Not on file    Years of education: Not on file    Highest education level: Not on file   Occupational History    Occupation: disability   Tobacco Use    Smoking status: Current Every Day Smoker     Packs/day: 0.50     Years: 15.00     Pack years: 7.50     Types: Cigarettes    Smokeless tobacco: Never Used   Vaping Use    Vaping Use: Former   Substance and Sexual Activity    Alcohol use: Yes     Comment: refused to answer how much but states he has been partaking more since his discharge from tx    Drug use: Yes     Types: Opiates , Cocaine     Comment: Hx of opiates, benzos, cocaine, alcohol abuse    Sexual activity: Yes     Partners: Female     Comment: Lives alone, not working.    Other Topics Concern    Not on file   Social History Narrative    ** Merged History Encounter **          Social Determinants of Health     Financial Resource Strain: Medium Risk    Difficulty of Paying Living Expenses: Somewhat hard   Food Insecurity: No Food Insecurity    Worried About Running Out of Food in the Last Year: Never true    Ran Out of Food in the Last Year: Never true   Transportation Needs: No Transportation Needs    Lack of Transportation (Medical): No    Lack of Transportation (Non-Medical):  No   Physical Activity: Inactive    Days of Exercise per Week: 0 days    Minutes of Exercise per Session: 0 min   Stress: Stress Concern Present    Feeling of Stress : Rather much   Social Connections: Socially Isolated    Frequency of Communication with Friends and Family: Never    Frequency of Social Gatherings with Friends and Family: Never    Attends Orthodox Services: Never    Active Member of Clubs or Organizations: No    Attends Club or Organization Meetings: Never    Marital Status: Never    Intimate Partner Violence: Not At Risk    Fear of Current or Ex-Partner: No    Emotionally Abused: No    Physically Abused: No    Sexually Abused: No   Housing Stability: Low Risk     Unable to Pay for Housing in the Last Year: No    Number of [place] in the Last Year: 1    Unstable Housing in the Last Year: No       Family History   Problem Relation Age of Onset    Diabetes Father     Alcohol Abuse Father     Depression Father     Arthritis Father     High Blood Pressure Father     Other Father         aneurysm & epilepsy    Migraines Father     Arthritis Mother     Other Mother         aneurysm & epilepsy    Migraines Mother     Diabetes Brother         Aunt and uncles    Depression Brother     Mental Illness Brother     Other Brother         epilepsy    Migraines Brother     Stroke Other         Uncle    Other Brother         murdered Oct 6th, 2014    Colon Cancer Paternal Cousin 43    Other Sister         epilepsy   Danielle Migraines Sister         Allergies:  Dicyclomine, Famotidine, Geodon [ziprasidone hcl], Haloperidol, Iv dye [iodides], Olanzapine, Reglan [metoclopramide], Ibuprofen, Aspirin, tablet Take 1 tablet by mouth 4 times daily 1/3/22   MASSIEL Martin CNP   traZODone (DESYREL) 50 MG tablet Take 1 tablet by mouth nightly as needed for Sleep 1/3/22   MASSIEL Martin CNP   ALPRAZolam Scandiajustina Ruiz) 0.5 MG tablet Take 1 tablet by mouth nightly as needed for Sleep for up to 30 days. 12/20/21 1/19/22  Zenaida Escalante MD   ascorbic acid (VITAMIN C) 500 MG tablet Take 1 tablet by mouth 2 times daily for 7 days 11/19/21 11/26/21  Thompson Monk DO   meloxicam (MOBIC) 7.5 MG tablet Take 1 tablet by mouth 2 times daily as needed for Pain 8/19/21 10/30/21  MASSIEL Martin CNP       REVIEW OFSYSTEMS    (2-9 systems for level 4, 10 or more for level 5)      Review of Systems   Constitutional: Positive for chills. Negative for fever. HENT: Negative for congestion and rhinorrhea. Eyes: Negative for visual disturbance. Respiratory: Positive for cough. Negative for shortness of breath. Cardiovascular: Negative for chest pain. Gastrointestinal: Positive for abdominal pain, blood in stool, diarrhea, nausea and vomiting. Negative for constipation. Genitourinary: Negative for dysuria and frequency. Musculoskeletal: Negative for back pain and neck pain. Skin: Negative for rash. Neurological: Negative for weakness, numbness and headaches. PHYSICAL EXAM   (up to 7 for level 4, 8 or more forlevel 5)      INITIAL VITALS:   ED Triage Vitals [01/06/22 2253]   BP Temp Temp Source Pulse Resp SpO2 Height Weight   (!) 141/87 97.3 °F (36.3 °C) Oral 85 16 98 % 5' 9\" (1.753 m) 220 lb (99.8 kg)       Physical Exam  Constitutional:       General: He is not in acute distress. Appearance: Normal appearance. He is not ill-appearing, toxic-appearing or diaphoretic. HENT:      Head: Normocephalic and atraumatic. Mouth/Throat:      Mouth: Mucous membranes are moist.      Pharynx: Oropharynx is clear. No oropharyngeal exudate or posterior oropharyngeal erythema.    Eyes: Extraocular Movements: Extraocular movements intact. Pupils: Pupils are equal, round, and reactive to light. Cardiovascular:      Rate and Rhythm: Normal rate and regular rhythm. Heart sounds: Normal heart sounds. No murmur heard. Pulmonary:      Effort: Pulmonary effort is normal. No respiratory distress. Breath sounds: Normal breath sounds. No rhonchi. Comments: Faint expiratory wheezes present throughout all lung fields. Abdominal:      Palpations: Abdomen is soft. Comments: Abdomen is soft, he has tenderness palpation the periumbilical region without rebound or guarding. Musculoskeletal:         General: Normal range of motion. Cervical back: Normal range of motion and neck supple. Right lower leg: No edema. Left lower leg: No edema. Skin:     General: Skin is warm and dry. Neurological:      General: No focal deficit present. Mental Status: He is alert and oriented to person, place, and time. DIFFERENTIAL  DIAGNOSIS     PLAN (LABS / IMAGING / EKG):  Orders Placed This Encounter   Procedures    COVID-19, Rapid    CBC Auto Differential    Basic Metabolic Panel w/ Reflex to MG    Hepatic Function Panel    Lipase       MEDICATIONS ORDERED:  Orders Placed This Encounter   Medications    0.9 % sodium chloride bolus    ondansetron (ZOFRAN) injection 4 mg    prochlorperazine (COMPAZINE) injection 10 mg    diphenhydrAMINE (BENADRYL) tablet 25 mg     Initial MDM/Plan/ED COURSE:    32 y.o. male who presents with 1 day history of abdominal pain, nausea, vomiting, diarrhea, and concerns of blood in the stool and emesis. On exam, patient is in no acute distress and vitals are all within normal limits. Heart is regular rate and rhythm and lungs clear to auscultation bilaterally. He does have periumbilical tenderness on exam but no rebound or guarding. Pulmonary exam notable for expiratory wheezes throughout all lung fields.   Patient appears well-hydrated, there are no other focal findings on exam.    Abdominal pain, nausea, vomiting, will check labs to evaluate for electrolytes, elevated lipase, other abnormalities. Also try to treat with fluids and antiemetics at this time. Patient is requesting Benadryl, we discussed this is not a typical medication to use for his symptoms. We will also do rectal exam to evaluate for blood in the stool. Patient appears well overall, some concern for Covid as well with vague systemic symptoms. Patient's labs have been unremarkable. Covid is negative. Patient was updated on these findings and he is feeling better after medications given. Tolerating p.o. Patient to be discharged home with close follow-up. DIAGNOSTIC RESULTS / EMERGENCYDEPARTMENT COURSE / MDM     LABS:  Labs Reviewed   CBC WITH AUTO DIFFERENTIAL - Abnormal; Notable for the following components:       Result Value    Lymphocytes 45 (*)     All other components within normal limits   BASIC METABOLIC PANEL W/ REFLEX TO MG FOR LOW K - Abnormal; Notable for the following components:    BUN 5 (*)     All other components within normal limits   HEPATIC FUNCTION PANEL - Abnormal; Notable for the following components: Total Bilirubin <0.10 (*)     All other components within normal limits   COVID-19, RAPID   LIPASE           No results found. EKG      All EKG's are interpreted by the Emergency Department Physicianwho either signs or Co-signs this chart in the absence of a cardiologist.      PROCEDURES:  None    CONSULTS:  None    CRITICAL CARE:  Please see attending note    FINAL IMPRESSION      1. Non-intractable vomiting with nausea, unspecified vomiting type    2. Diarrhea, unspecified type          DISPOSITION / PLAN     DISPOSITION Decision To Discharge 01/07/2022 01:09:32 AM      PATIENT REFERRED TO:  No follow-up provider specified.     DISCHARGE MEDICATIONS:  Discharge Medication List as of 1/7/2022  1:14 AM          Fara Piper Rica Chiu, [address] [place] [address]  Emergency Medicine Resident    (Please note that portions of this note were completed with a voice recognition program.Efforts were made to edit the dictations but occasionally words are mis-transcribed.)       Gail Prasad DO  Resident  01/07/22 3887

## 2022-01-07 NOTE — CARE COORDINATION
[facility] Biopsychosocial Assessment      Current Level of Psychosocial Functioning      Independent   Dependent  X  Minimal Assist      Comments:  Client has a principle diagnosis of Schizoaffective disorder, bipolar type, current depression with suicidal ideations, which is the condition established to be chiefly responsible for the admission of the client on this date. Client documentation provides prior diagnoses of Opioid-use disorder      Psychosocial High-Risk Factors (check all that apply)     Unable to obtain meds   Chronic illness/pain    Not taking medications X  Substance abuse X  Lack of Family Support   Addictive Behaviors  Financial stress   Isolation  Inadequate Community Resources X  Suicide attempt(s)   Homicidal ideations  Self-mutilation  Victim of crime   Legal issues  Developmental Delay  Unable to manage personal needs    Age 72 or older   Homeless  No transportation   Readmission within 30 days   Unemployment  Traumatic Event X    Psychiatric Advanced Directives:  RANDAL - Nothing reported      Family to Sharad Benitez in Treatment:  RANDAL - Client lists motherTyrone at [phone] as emergency contact     Sexual Orientation:   RANDAL - Single male     Patient Strengths:  RANDAL - Medicare/Medicaid [place], SSDI, support from mother,      Patient Barriers:   RANDAL - Multiple inpatient psychiatric hospitalizations, substance abuse     Opiate/AOD Referral and/or Education Provided: Tox positive for Cocaine, Benzodiazapine, Barbiturates in ED. RANDAL - Client was not provided with opiate and other drug education as well as relapse prevention information.      CMHC/mental health history:   RANDAL - Client has been linked with Betito Wheatley in the past     Plan of Care:  Medication management, group/individual therapies, family meetings, psychoeducation, 1:1 counseling, treatment team meetings to assist with stabilization      Initial Discharge Plan:  Stabilize mood and medications; explore social support systems within community to increase socialization; provide 24/7 local and national hotline numbers for additional support; safety plan to be completed; disclose/discuss suicidal ideas will improve, decrease depressive symptoms, absence of self-harm;     Clinical Summary per ED:  Client was brought to the unit from [facility] ED and after signing all paperwork, was given a meal, and went straight to room. This writer attempts to assess client but he does not acknowledge or roll over to make eye contact. \"30 y.o. male presents with c/o suicidal thoughts. The patient brought himself to the emergency department. The patient reports feeling depressed, overwhelmed and having thought of suicide for the last few days. The patient reports he tried to overdose on fentanyl a few days ago that was not successful. He states he has been  Thinking of cutting his throat. The patient denies a h/o of previous suicide attempt. The patient reports that their symptoms were provoked by his girlfriend leaving him. The patient does have a h/o of previous psychiatric admission most recently in November of 2021. The patient denies drug use outside of the recent fentanyl use. \"

## 2022-01-07 NOTE — BH NOTE
Patient given tobacco quitline number 11883590246 at this time, refusing to call at this time, states \" I just dont want to quit now\"- patient given information as to the dangers of long term tobacco use. Continue to reinforce the importance of tobacco cessation.

## 2022-01-07 NOTE — ED NOTES
2 straight stick attempts by this RN unsuccessful. Lab paged for blood draw.      Connor Contreras RN  01/07/22 1629

## 2022-01-07 NOTE — BH NOTE
[facility]  Admission Note     Admission Type:   Admission Type: Voluntary    Reason for admission:  Reason for Admission: suicidal ideation with plan to cut throat    PATIENT STRENGTHS:  Strengths: Communication,No significant Physical Illness    Patient Strengths and Limitations:  Limitations: Tendency to isolate self,Apathetic / unmotivated    Addictive Behavior:   Addictive Behavior  In the past 3 months, have you felt or has someone told you that you have a problem with:  : None  Do you have a history of Chemical Use?: No  Do you have a history of Alcohol Use?: No  Do you have a history of Street Drug Abuse?: No  Histroy of Prescripton Drug Abuse?: No    Medical Problems:   Past Medical History:   Diagnosis Date    Anxiety     Arthritis     Asthma     Bipolar I disorder, most recent episode (or current) depressed, unspecified 9/12/2014    Clostridium difficile infection     COPD (chronic obstructive pulmonary disease) ([address]     Depression     Disease of blood and blood forming organ     Eczema     Fracture, metacarpal     R 4th and 5th    Gastric ulcer     Gastritis 06/13/2018    GERD (gastroesophageal reflux disease)     GI bleed     H. 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[address]     Substance abuse ([address]     Hx of opiates, benzos, cocaine, alcohol abuse    Suicidal ideation     Suicide attempt by hanging ([address]     Tobacco dependence     Ulcerative colitis ([address]     UTI (urinary tract infection)        Status EXAM:  Status and Exam  Normal: No  Facial Expression: Flat  Affect: Blunt  Level of Consciousness: Alert  Mood:Normal: No  Mood: Depressed,Anxious  Motor Activity:Normal: Yes  Interview Behavior: Cooperative,Evasive  Preception: [place] to Person,[place] to Time,[place] to Place,[place] to Situation  Attention:Normal: No  Attention: Distractible  Thought Processes: Circumstantial  Thought Content:Normal: No  Thought Content: Preoccupations  Hallucinations: None  Delusions: No  Memory:Normal: Yes  Insight and Judgment: No  Insight and Judgment: Poor Judgment,Poor Insight  Present Suicidal Ideation: Yes (contracts for safety)  Present Homicidal Ideation: No    Tobacco Screening:  Practical Counseling, on admission, reuben X, if applicable and completed (first 3 are required if patient doesn't refuse):            ( )  Recognizing danger situations (included triggers and roadblocks)                    ( )  Coping skills (new ways to manage stress, exercise, relaxation techniques, changing routine, distraction)                                                           ( )  Basic information about quitting (benefits of quitting, techniques in how to quit, available resources  ( ) Referral for counseling faxed to [facility]                                           (x ) Patient refused counseling  ( ) Patient has not smoked in the last 30 days    Metabolic Screening:    Lab Results   Component Value Date    LABA1C 5.7 10/23/2014       Lab Results   Component Value Date    CHOL 205 (H) 02/21/2017    CHOL 158 11/09/2015    CHOL 152 10/23/2014    CHOL 206 (H) 03/05/2014    CHOL 205 (H) 01/22/2014    CHOL 208 (H) 08/28/2013     Lab Results   Component Value Date    TRIG 189 (H) 02/21/2017    TRIG 223 (H) 11/09/2015    TRIG 52 10/23/2014    TRIG 104 03/05/2014    TRIG 74 01/22/2014    TRIG 104 08/28/2013     Lab Results   Component Value Date    HDL 44 02/21/2017    HDL 40 (L) 11/09/2015    HDL 55 10/23/2014    HDL 54 03/05/2014    HDL 53 01/22/2014    HDL 72 (H) 08/28/2013     No components found for: LDLCAL  No results found for: LABVLDL      Body mass index is 32.49 kg/m². BP Readings from Last 2 Encounters:   01/07/22 (!) 139/97   01/06/22 (!) 141/87           Pt admitted with followings belongings:   see chart     . Patient oriented to surroundings and program expectations and copy of patient rights given. Received admission packet:  yes. Consents reviewed, signed all. Patient verbalize understanding:  yes. Patient education on precautions: yes  Patient voluntary from [facility] with suicidal ideation and a plan to cut throat. Reports thoughts of self harm on admission but contracts for safety. Patient refused skin assessment but denies issues.                    Clemente Wolf RN

## 2022-01-08 PROCEDURE — 2580000003 HC RX 258: Performed by: PSYCHIATRY & NEUROLOGY

## 2022-01-08 PROCEDURE — 1240000000 HC EMOTIONAL WELLNESS R&B

## 2022-01-08 PROCEDURE — 6370000000 HC RX 637 (ALT 250 FOR IP): Performed by: PSYCHIATRY & NEUROLOGY

## 2022-01-08 PROCEDURE — 99232 SBSQ HOSP IP/OBS MODERATE 35: CPT | Performed by: NURSE PRACTITIONER

## 2022-01-08 PROCEDURE — 2500000003 HC RX 250 WO HCPCS: Performed by: PSYCHIATRY & NEUROLOGY

## 2022-01-08 PROCEDURE — 6370000000 HC RX 637 (ALT 250 FOR IP): Performed by: NURSE PRACTITIONER

## 2022-01-08 RX ORDER — OLANZAPINE 15 MG/1
15 TABLET ORAL NIGHTLY
Status: DISCONTINUED | OUTPATIENT
Start: 2022-01-08 | End: 2022-01-10 | Stop reason: HOSPADM

## 2022-01-08 RX ORDER — CHLORPROMAZINE HYDROCHLORIDE 25 MG/1
50 TABLET, FILM COATED ORAL 2 TIMES DAILY PRN
Status: DISCONTINUED | OUTPATIENT
Start: 2022-01-08 | End: 2022-01-10 | Stop reason: HOSPADM

## 2022-01-08 RX ADMIN — METFORMIN HYDROCHLORIDE 500 MG: 500 TABLET ORAL at 17:29

## 2022-01-08 RX ADMIN — GABAPENTIN 800 MG: 400 CAPSULE ORAL at 13:16

## 2022-01-08 RX ADMIN — NICOTINE POLACRILEX 4 MG: 4 GUM, CHEWING BUCCAL at 17:30

## 2022-01-08 RX ADMIN — TRAZODONE HYDROCHLORIDE 50 MG: 50 TABLET ORAL at 20:31

## 2022-01-08 RX ADMIN — SUCRALFATE 1 G: 1 TABLET ORAL at 17:28

## 2022-01-08 RX ADMIN — NICOTINE POLACRILEX 4 MG: 4 GUM, CHEWING BUCCAL at 14:30

## 2022-01-08 RX ADMIN — GABAPENTIN 800 MG: 400 CAPSULE ORAL at 17:28

## 2022-01-08 RX ADMIN — OLANZAPINE 5 MG: 10 INJECTION, POWDER, FOR SOLUTION INTRAMUSCULAR at 13:36

## 2022-01-08 RX ADMIN — BUPRENORPHINE AND NALOXONE 1 FILM: 8; 2 FILM BUCCAL; SUBLINGUAL at 20:32

## 2022-01-08 RX ADMIN — SUCRALFATE 1 G: 1 TABLET ORAL at 20:32

## 2022-01-08 RX ADMIN — NICOTINE POLACRILEX 2 MG: 2 GUM, CHEWING BUCCAL at 07:31

## 2022-01-08 RX ADMIN — NICOTINE POLACRILEX 2 MG: 2 GUM, CHEWING BUCCAL at 11:43

## 2022-01-08 RX ADMIN — BUPRENORPHINE AND NALOXONE 1 FILM: 8; 2 FILM BUCCAL; SUBLINGUAL at 08:57

## 2022-01-08 RX ADMIN — METFORMIN HYDROCHLORIDE 500 MG: 500 TABLET ORAL at 08:57

## 2022-01-08 RX ADMIN — HYDROXYZINE HYDROCHLORIDE 50 MG: 50 TABLET, FILM COATED ORAL at 18:57

## 2022-01-08 RX ADMIN — NICOTINE POLACRILEX 4 MG: 4 GUM, CHEWING BUCCAL at 21:24

## 2022-01-08 RX ADMIN — GABAPENTIN 800 MG: 400 CAPSULE ORAL at 08:57

## 2022-01-08 RX ADMIN — SUCRALFATE 1 G: 1 TABLET ORAL at 08:57

## 2022-01-08 RX ADMIN — SUCRALFATE 1 G: 1 TABLET ORAL at 13:16

## 2022-01-08 RX ADMIN — NICOTINE POLACRILEX 4 MG: 4 GUM, CHEWING BUCCAL at 18:57

## 2022-01-08 RX ADMIN — WATER 2.1 ML: 1 INJECTION INTRAMUSCULAR; INTRAVENOUS; SUBCUTANEOUS at 13:36

## 2022-01-08 RX ADMIN — NICOTINE POLACRILEX 4 MG: 4 GUM, CHEWING BUCCAL at 20:12

## 2022-01-08 RX ADMIN — GABAPENTIN 800 MG: 400 CAPSULE ORAL at 20:31

## 2022-01-08 RX ADMIN — OLANZAPINE 15 MG: 15 TABLET, FILM COATED ORAL at 20:32

## 2022-01-08 RX ADMIN — HYDROXYZINE HYDROCHLORIDE 50 MG: 50 TABLET, FILM COATED ORAL at 20:42

## 2022-01-08 RX ADMIN — NICOTINE POLACRILEX 2 MG: 2 GUM, CHEWING BUCCAL at 13:17

## 2022-01-08 NOTE — PROGRESS NOTES
Daily Progress Note  1/8/2022    Patient Name: Mikayla Stone    CHIEF COMPLAINT: Depression with suicidal ideation         SUBJECTIVE:      Patient is seen today for a follow up assessment. Sangeeta Washburn is interviewed in the common area. He reports that he is feeling agitated, he does not feel that the olanzapine is helping and wishes for the medication to be switched to Benadryl. He reports that \"I just received a shot and it did not do anything\". Based on documentation he did approach the nurses station with a report of agitation he was accepting of the IM injection. We discussed the risk benefits and the fact that we would not be switching Zyprexa to Benadryl. He reports that he takes 50 mg of Benadryl at night to sleep and that he would be \"a whole new person\" if we switch to this medication. He would also like to be prescribed tizanidine as well, he believes that the attending psychiatrist \"forgot\" to prescribe this medication. He continues to endorse suicidal ideation, reports that his symptoms are no better. He reports increased stress related to a recent break-up and states that he was kicked out of the last recovery program because his roommate was using which led to his failed sobriety. He reports that he would like to engage in some sort of treatment plan or sober living upon discharge.     Appetite:  [x] Adequate/Unchanged  [] Increased  [] Decreased      Sleep:       [] Adequate/Unchanged  [x] Fair  [] Poor      Group Attendance on Unit:   [] Yes  [] Selectively    [x] No    Medication Side Effects: Denies         Mental Status Exam  Level of consciousness: Alert and awake   Appearance: Appropriate attire for setting, seated in chair, with fair  grooming and hygiene   Behavior/Motor: Approachable, anxious, multiple requests  Attitude toward examiner: Cooperative, attentive, good eye contact  Speech: Rapid, normal volume and tone  Mood: \"Depressed\"  Affect: Anxious, persistent  Thought processes: Linear and coherent  Thought content: Medication focused, Denies homicidal ideation  Suicidal Ideation: Active suicidal ideations, contracts for safety on the unit. Delusions: No evidence of delusions. Perceptual Disturbance: Denies, patient does not appear to be responding to internal stimuli. Cognition: Oriented to self, location, time, and situation  Memory: intact  Insight: poor   Judgement: poor       Data   height is 5' 9\" (1.753 m) and weight is 220 lb (99.8 kg). His oral temperature is 98.4 °F (36.9 °C). His blood pressure is 102/51 (abnormal) and his pulse is 67. His respiration is 14 and oxygen saturation is 97%.    Labs:   Admission on 01/07/2022   Component Date Value Ref Range Status    Ethanol 01/07/2022 <10  <10 mg/dL Final    Ethanol percent 01/07/2022 <0.010  % Final    Amphetamine Screen, Ur 01/07/2022 NEGATIVE  NEGATIVE Final    Comment:       (Positive cutoff 1000 ng/mL)                  Barbiturate Screen, Ur 01/07/2022 POSITIVE* NEGATIVE Final    Comment:       (Positive cutoff 200 ng/mL)                  Benzodiazepine Screen, Urine 01/07/2022 POSITIVE* NEGATIVE Final    Comment:       (Positive cutoff 200 ng/mL)                  Cocaine Metabolite, Urine 01/07/2022 POSITIVE* NEGATIVE Final    Comment:       (Positive cutoff 300 ng/mL)                  Methadone Screen, Urine 01/07/2022 NEGATIVE  NEGATIVE Final    Comment:       (Positive cutoff 300 ng/mL)                  Opiates, Urine 01/07/2022 NEGATIVE  NEGATIVE Final    Comment:       (Positive cutoff 300 ng/mL)                  Phencyclidine, Urine 01/07/2022 NEGATIVE  NEGATIVE Final    Comment:       (Positive cutoff 25 ng/mL)                  Propoxyphene, Urine 01/07/2022 NOT REPORTED  NEGATIVE Final    Cannabinoid Scrn, Ur 01/07/2022 NEGATIVE  NEGATIVE Final    Comment:       (Positive cutoff 50 ng/mL)                  Oxycodone Screen, Ur 01/07/2022 NEGATIVE  NEGATIVE Final    Comment:       (Positive cutoff 100 ng/mL)                  Methamphetamine, Urine 01/07/2022 NOT REPORTED  NEGATIVE Final    Tricyclic Antidepressants, Urine 01/07/2022 NOT REPORTED  NEGATIVE Final    MDMA, Urine 01/07/2022 NOT REPORTED  NEGATIVE Final    Buprenorphine Urine 01/07/2022 NOT REPORTED  NEGATIVE Final    Test Information 01/07/2022 Assay provides medical screening only. The absence of expected drug(s) and/or metabolite(s) may indicate diluted or adulterated urine, limitations of testing or timing of collection. Final    Comment: Testing for legal purposes should be confirmed by another method. To request confirmation   of test result, please call the lab within 7 days of sample submission.  Acetaminophen Level 01/07/2022 <5* 10 - 30 ug/mL Final    Salicylate Lvl 14/21/5914 <1* 3 - 10 mg/dL Final    Specimen Description 01/07/2022 . NASOPHARYNGEAL SWAB   Final    SARS-CoV-2, Rapid 01/07/2022 Not Detected  Not Detected Final    Comment:       Rapid NAAT:  The specimen is NEGATIVE for SARS-CoV-2, the novel coronavirus associated with   COVID-19. The ID NOW COVID-19 assay is designed to detect the virus that causes COVID-19 in patients   with signs and symptoms of infection who are suspected of COVID-19. An individual without symptoms of COVID-19 and who is not shedding SARS-CoV-2 virus would   expect to have a negative (not detected) result in this assay. Negative results should be treated as presumptive and, if inconsistent with clinical signs   and symptoms or necessary for patient management,  should be tested with an alternative molecular assay. Negative results do not preclude   SARS-CoV-2 infection and   should not be used as the sole basis for patient management decisions.          Fact sheet for Healthcare Providers: Darian  Fact sheet for Patients: Darian          Methodology: Isothermal Nucleic Acid Amplification      POC Glucose 01/07/2022 91  75 - 110 mg/dL Final   Admission on 01/06/2022, Discharged on 01/07/2022   Component Date Value Ref Range Status    WBC 01/07/2022 5.4  3.5 - 11.3 k/uL Final    RBC 01/07/2022 4.57  4.21 - 5.77 m/uL Final    Hemoglobin 01/07/2022 13.5  13.0 - 17.0 g/dL Final    Hematocrit 01/07/2022 41.0  40.7 - 50.3 % Final    MCV 01/07/2022 89.7  82.6 - 102.9 fL Final    MCH 01/07/2022 29.5  25.2 - 33.5 pg Final    MCHC 01/07/2022 32.9  28.4 - 34.8 g/dL Final    RDW 01/07/2022 13.5  11.8 - 14.4 % Final    Platelets 61/39/3816 227  138 - 453 k/uL Final    MPV 01/07/2022 10.4  8.1 - 13.5 fL Final    NRBC Automated 01/07/2022 0.0  0.0 per 100 WBC Final    Differential Type 01/07/2022 NOT REPORTED   Final    Seg Neutrophils 01/07/2022 43  36 - 65 % Final    Lymphocytes 01/07/2022 45* 24 - 43 % Final    Monocytes 01/07/2022 8  3 - 12 % Final    Eosinophils % 01/07/2022 3  1 - 4 % Final    Basophils 01/07/2022 1  0 - 2 % Final    Immature Granulocytes 01/07/2022 0  0 % Final    Segs Absolute 01/07/2022 2.31  1.50 - 8.10 k/uL Final    Absolute Lymph # 01/07/2022 2.44  1.10 - 3.70 k/uL Final    Absolute Mono # 01/07/2022 0.45  0.10 - 1.20 k/uL Final    Absolute Eos # 01/07/2022 0.18  0.00 - 0.44 k/uL Final    Basophils Absolute 01/07/2022 0.03  0.00 - 0.20 k/uL Final    Absolute Immature Granulocyte 01/07/2022 <0.03  0.00 - 0.30 k/uL Final    WBC Morphology 01/07/2022 NOT REPORTED   Final    RBC Morphology 01/07/2022 NOT REPORTED   Final    Platelet Estimate 50/42/1019 NOT REPORTED   Final    Glucose 01/07/2022 92  70 - 99 mg/dL Final    BUN 01/07/2022 5* 6 - 20 mg/dL Final    CREATININE 01/07/2022 0.82  0.70 - 1.20 mg/dL Final    Bun/Cre Ratio 01/07/2022 NOT REPORTED  9 - 20 Final    Calcium 01/07/2022 9.1  8.6 - 10.4 mg/dL Final    Sodium 01/07/2022 139  135 - 144 mmol/L Final    Potassium 01/07/2022 4.2  3.7 - 5.3 mmol/L Final    Chloride 01/07/2022 105  98 - 107 mmol/L Final  CO2 01/07/2022 25  20 - 31 mmol/L Final    Anion Gap 01/07/2022 9  9 - 17 mmol/L Final    GFR Non- 01/07/2022 >60  >60 mL/min Final    GFR  01/07/2022 >60  >60 mL/min Final    GFR Comment 01/07/2022        Final    Comment: Average GFR for 30-36 years old:   82 [address] mL/min/1.73sq m  Chronic Kidney Disease:   <60 mL/min/1.73sq m  Kidney failure:   <15 mL/min/1.73sq m              eGFR calculated using average adult body mass. Additional eGFR calculator available at:        [employer]            GFR Staging 01/07/2022 NOT REPORTED   Final    Albumin 01/07/2022 3.7  3.5 - 5.2 g/dL Final    Alkaline Phosphatase 01/07/2022 61  40 - 129 U/L Final    ALT 01/07/2022 8  5 - 41 U/L Final    AST 01/07/2022 14  <40 U/L Final    Total Bilirubin 01/07/2022 <0.10* 0.3 - 1.2 mg/dL Final    Bilirubin, Direct 01/07/2022 <0.08  <0.31 mg/dL Final    Bilirubin, Indirect 01/07/2022 Can not be calculated  0.00 - 1.00 mg/dL Final    Total Protein 01/07/2022 6.6  6.4 - 8.3 g/dL Final    Globulin 01/07/2022 NOT REPORTED  1.5 - 3.8 g/dL Final    Albumin/Globulin Ratio 01/07/2022 1.3  1.0 - 2.5 Final    Lipase 01/07/2022 24  13 - 60 U/L Final    Specimen Description 01/07/2022 . NASOPHARYNGEAL SWAB   Final    SARS-CoV-2, Rapid 01/07/2022 Not Detected  Not Detected Final    Comment:       Rapid NAAT:  The specimen is NEGATIVE for SARS-CoV-2, the novel coronavirus associated with   COVID-19. The ID NOW COVID-19 assay is designed to detect the virus that causes COVID-19 in patients   with signs and symptoms of infection who are suspected of COVID-19. An individual without symptoms of COVID-19 and who is not shedding SARS-CoV-2 virus would   expect to have a negative (not detected) result in this assay.   Negative results should be treated as presumptive and, if inconsistent with clinical signs   and symptoms or necessary for patient management,  should be tested with an alternative molecular assay. Negative results do not preclude   SARS-CoV-2 infection and   should not be used as the sole basis for patient management decisions. Fact sheet for Healthcare Providers: Darian  Fact sheet for Patients: Darian          Methodology: Isothermal Nucleic Acid Amplification           Reviewed patient's current plan of care and vital signs with nursing staff. Labs reviewed: [x] Yes    Medications  Current Facility-Administered Medications: chlorproMAZINE (THORAZINE) tablet 50 mg, 50 mg, Oral, BID PRN  nicotine polacrilex (NICORETTE) gum 4 mg, 4 mg, Oral, PRN  OLANZapine (ZYPREXA) tablet 15 mg, 15 mg, Oral, Nightly  buprenorphine-naloxone (SUBOXONE) 8-2 MG SL film 1 Film, 1 Film, SubLINGual, BID  gabapentin (NEURONTIN) capsule 800 mg, 800 mg, Oral, 4x Daily  metFORMIN (GLUCOPHAGE) tablet 500 mg, 500 mg, Oral, BID WC  pantoprazole (PROTONIX) tablet 40 mg, 40 mg, Oral, QAM AC  sucralfate (CARAFATE) tablet 1 g, 1 g, Oral, 4x Daily  hydrOXYzine (ATARAX) tablet 50 mg, 50 mg, Oral, TID PRN  traZODone (DESYREL) tablet 50 mg, 50 mg, Oral, Nightly PRN  OLANZapine (ZYPREXA) injection 5 mg, 5 mg, IntraMUSCular, BID PRN **AND** sterile water injection 2.1 mL, 2.1 mL, IntraMUSCular, BID PRN    ASSESSMENT  MDD (major depressive disorder), recurrent severe, without psychosis ([facility] [place] [address]         PLAN  Patient symptoms are: Minimally improved  Increase nicotine gum to 4 mg as needed  Increase olanzapine 15 mg nightly  Monitor need and frequency of PRN medications. Encourage participation in groups and milieu. Attempt to develop insight. Psycho-education conducted. Supportive Therapy conducted. Probable discharge is per attending physician. Follow-up daily while inpatient. Patient continues to be monitored in the inpatient psychiatric facility at [facility] for safety and stabilization.  Patient continues to need, on a daily basis, active treatment furnished directly by or requiring the supervision of inpatient psychiatric personnel. Electronically signed by MASSIEL Ballesteros CNP on 1/8/2022 at 2:13 PM    **This report has been created using voice recognition software. It may contain minor errors which are inherent in voice recognition technology. **

## 2022-01-08 NOTE — H&P
medications. The patient states that he has been taking them as prescribed. He was also not received any sedating medications. Indications and risk benefit analysis of olanzapine were discussed with the patient in detail. . It has  Been ordered for him. History of head trauma: [x] Yes [] No evident scar at right forehead patient reports injury as child with sutures recalls loss of consciousness    History of seizures: [x] Yes [] No during placement of nerve stimulator in 2018-isolated event    History of violence or aggression: [] Yes [x] No         PSYCHIATRIC HISTORY:  [x] Yes [] No    Currently follows with Suboxone provider  2 lifetime suicide attempts 1 by wrist laceration as a child and another by overdose. Multiple psychiatric hospital admissions including most recently [facility] 11/20/2021, 10/30/2021, 6/27/2021, 2/2019. Home Medication Compliance: [] Yes [x] No    Past psychiatric medications includes: Clonidine, Remeron, Geodon, Cymbalta, Abilify, Aristada, Neurontin, trazodone, Seroquel, Suboxone, methadone, Wellbutrin, Zyprexa, Haldol    Adverse reactions from psychotropic medications: [x] Yes [] No  Endorses reaction to Haldol including throat swelling and thick tongue.   Anaphylaxis with Geodon, Remeron increase agitation and decreased sleep improvement       Lifetime Psychiatric Review of Systems         Depression: Endorses     Anxiety: Endorses     Panic Attacks: Denies     Elizabeth or Hypomania: Denies     Phobias: Denies     Obsessions and Compulsions: Denies     Visual Hallucinations: Denies     Auditory Hallucinations: Denies     Delusions: Denies     Paranoia: Denies     PTSD: Denies    Past Medical History:        Diagnosis Date    Anxiety     Arthritis     Asthma     Bipolar I disorder, most recent episode (or current) depressed, unspecified 9/12/2014    Clostridium difficile infection     [facility]) [facility] [address]     Depression     Disease of blood and blood forming organ     Eczema     Fracture, metacarpal     R 4th and 5th    Gastric ulcer     Gastritis 06/13/2018    GERD (gastroesophageal reflux disease)     GI bleed     H. 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[address]     Substance abuse ([facility] [address]     Hx of opiates, benzos, cocaine, alcohol abuse    Suicidal ideation     Suicide attempt by hanging ([facility] [address]     Tobacco dependence     Ulcerative colitis ([facility] [address]     UTI (urinary tract infection)        Past Surgical History:        Procedure Laterality Date    ABDOMEN SURGERY      upper GI scope 7/7/2015    BRONCHOSCOPY      CHOLECYSTECTOMY, LAPAROSCOPIC  07/14/2017    surgery performed at [address] [place] [address], COLON, DIAGNOSTIC      OTHER SURGICAL HISTORY      lumbar puncture    PA COLONOSCOPY W/BIOPSY SINGLE/MULTIPLE  8/9/2017    COLONOSCOPY WITH BIOPSY performed [facility]    PA EGD TRANSORAL BIOPSY SINGLE/MULTIPLE N/A 3/20/2017    EGD BIOPSY performed by Viola Solorio MD at [facility]    PA ESOPHAGOGASTRODUODENOSCOPY TRANSORAL DIAGNOSTIC N/A 8/9/2017    EGD ESOPHAGOGASTRODUODENOSCOPY performed by Hailey Kirby MD at [address] N/A 3/20/2017    SIGMOIDOSCOPY DIAGNOSTIC [facility]LEXIBLE [facility]    UPPER GASTROINTESTINAL ENDOSCOPY N/A 6/13/2018    GASTRITIS    UPPER GASTROINTESTINAL ENDOSCOPY N/A 9/20/2018    EGD BIOPSY performed by Christiano Canales MD at [address]       Allergies:  Dicyclomine, Father     Arthritis Father     High Blood Pressure Father     Other Father         aneurysm & epilepsy    Migraines Father     Arthritis Mother     Other Mother         aneurysm & epilepsy    Migraines Mother     Diabetes Brother         Aunt and uncles    Depression Brother     Mental Illness Brother     Other Brother         epilepsy    Migraines Brother     Stroke Other         Uncle    Other Brother         murdered Oct 6th, 2014    Colon Cancer Paternal Cousin 43    Other Sister         epilepsy    Migraines Sister        Psychiatric Family History    Patient endorses psychiatric family history reporting depression and anxiety. Suicides in family: [] Yes [x] No    Substance use in family: [] Yes [x] No         PHYSICAL EXAM:  Vitals:  /80   Pulse 91   Temp 98.3 °F (36.8 °C)   Resp 14   Ht 5' 9\" (1.753 m)   Wt 220 lb (99.8 kg)   SpO2 97%   BMI 32.49 kg/m²     Pain Level: Denies currently, history of chronic sciatica pain with placed nerve stimulator at right inner thigh since 2018. Reports connected with neurosurgery at [address] [place] [address]. Previously involved with pain management, denies current involvement. LABS:  Labs reviewed: [x] Yes  BUN/creatinine ratio 22, anion gap 7, glucose 117, bilirubin 0.20, hemoglobin 12.8, hematocrit 39.1, eosin % 5  UDS positive buprenorphine and cocaine metabolite  Last EKG in EMR reviewed: [x] Yes  QTc: 438          Review of Systems   Constitutional: Negative for chills and weight loss. HENT: Negative for ear pain and nosebleeds. Eyes: Negative for blurred vision and photophobia. Respiratory: Negative for cough, shortness of breath and wheezing. Cardiovascular: Negative for chest pain and palpitations. Gastrointestinal: Negative for abdominal pain, diarrhea and vomiting. Genitourinary: Negative for dysuria and urgency. Musculoskeletal: Negative for falls and joint pain.    Skin: Negative for itching and rash.   Neurological: Negative for tremors, seizures and weakness. Endo/Heme/Allergies: Does not bruise/bleed easily. Physical Exam:   Constitutional:  Appears well-developed and well-nourished, no acute distress. HENT:   Head: Normocephalic and atraumatic. Eyes: Conjunctivae are normal. Right eye exhibits no discharge. Left eye exhibits no discharge. No scleral icterus. Neck: Normal range of motion. Neck supple. Pulmonary/Chest:  No respiratory distress or accessory muscle use, no wheezing. Cardiac: Regular rate and rhythm. Abdominal: Soft. Non-tender. Exhibits no distension. Musculoskeletal: Normal range of motion. Exhibits no edema. Neurological: cranial nerves II-XII grossly in tact, normal gait and station. Skin: Skin is warm and dry. Patient is not diaphoretic. No erythema. Mental Status Examination:    Level of consciousness: Awake and alert  Appearance:  casually dressed, ambulating in day area, fair grooming   Behavior/Motor: Approachable, no psychomotor abnormalities noted  Attitude toward examiner:  Cooperative, attentive, good eye contact  Speech: Normal rate, volume, and tone. Mood: Depressed  Affect:  Blunted  Thought processes: Linear, goal directed and coherent  Thought content: Active suicidal ideations, with current plan or intent, contracts for safety on the unit. Denies homicidal ideations               Denies visual hallucinations. Denies auditory hallucinations.               Denies delusions              Denies paranoia  Cognition:  Oriented to self, location, time, situation  Concentration: Clinically adequate  Memory: Intact  Insight &Judgment: Poor         DSM-5 Diagnosis    Principal Problem: MDD (major depressive disorder), recurrent severe, without psychosis ([facility] [address]      History of schizoaffective disorder bipolar  type  Opiate use disorder   Cocaine abuse  MORGAN   Consider substance-induced mood disorder    Psychosocial and Contextual factors:  Financial: Endorses  Occupational: Endorses  Relationship: Endorses  Legal: Denies  Living situation: Denies  Educational: Denies    Past Medical History:   Diagnosis Date    Anxiety     Arthritis     Asthma     Bipolar I disorder, most recent episode (or current) depressed, unspecified 9/12/2014    Clostridium difficile infection     COPD (chronic obstructive pulmonary disease) ([facility] [address]     Depression     Disease of blood and blood forming organ     Eczema     Fracture, metacarpal     R 4th and 5th    Gastric ulcer     Gastritis 06/13/2018    GERD (gastroesophageal reflux disease)     GI bleed     H. 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 Home medications reviewed. PDMP checked  Suboxone and gabapentin continued  Will starte Olanzapine 10mg nightly instead of Seroquel. Problem list updated. Monitor need and frequency of PRN medications. Attempt to develop insight. Follow-up daily while inpatient. Reviewed risks and benefits as well as potential side effects with patient.     CONSULTS [] Yes [x] No    Risk Management: close watch per standard protocol      Psychotherapy: participation in milieu and group and individual sessions with Attending Physician,  and Physician Assistant/CNP      Estimated length of stay:  2-14 days      GENERAL PATIENT/FAMILY EDUCATION  Patient will understand basic signs and symptoms, patient will understand benefits/risks and potential side effects from proposed medications, and patient will understand their role in recovery. Family is not active in patient's care. Patient assets that may be helpful during treatment include: Intent to participate and engage in treatment, sufficient fund of knowledge and intellect to understand and utilize treatments. Goals:    1) Remission of suicidal ideation  2) Stabilization of symptoms prior to discharge. 3) Establish efficacy and tolerability of medications. Behavioral Services  Medicare Certification     Admission Day 1  I certify that this patient's inpatient psychiatric hospital admission is medically necessary for:    x (1) treatment which could reasonably be expected to improve this patient's condition, or    x (2) diagnostic study or its equivalent. Time Spent: 60 minutes    Trent Antonio is a 32 y.o. male being evaluated face to face    --Luz Mclean MD on 1/7/2022 at 11:22 PM    An electronic signature was used to authenticate this note.

## 2022-01-08 NOTE — GROUP NOTE
Group Therapy Note    Date: 1/7/2022    Group Start Time: 2030  Group End Time: 2111  Group Topic: Wrap-Up    TIESHA Patel        Group Therapy Note    Attendees: 12         Patient's Goal:  Pt came in & out of group several times    Notes:  Pt refused to verbally particiapte    Status After Intervention:  Unchanged    Participation Level: Minimal    Participation Quality: Resistant      Speech:  hesitant      Thought Process/Content: Linear      Affective Functioning: Blunted      Mood: depressed      Level of consciousness:  Alert      Response to Learning: Resistant      Endings: None Reported    Modes of Intervention: Problem-solving      Discipline Responsible: [employer]      Signature:  Promise Myers

## 2022-01-08 NOTE — GROUP NOTE
Group Therapy Note    Date: 1/8/2022    Group Start Time: 1000  Group End Time: 1030  Group Topic: Psychoeducation    [facility]    Jese Abbott        Group Therapy Note             Patient refused to attend psychotherapy group after encouragement from staff. 1:1 talk time offered but refused. Signature:   Jese Abbott

## 2022-01-08 NOTE — PROGRESS NOTES
Behavioral Services  Medicare Certification Upon Admission    I certify that this patient's inpatient psychiatric hospital admission is medically necessary for:    [x] (1) Treatment which could reasonably be expected to improve this patient's condition,       [x] (2) Or for diagnostic study;     AND     [x](2) The inpatient psychiatric services are provided while the individual is under the care of a physician and are included in the individualized plan of care.     Estimated length of stay/service 3-5 days    Plan for post-hospital care -outpatient care    Electronically signed by Don Park MD on 1/7/2022 at 11:21 PM

## 2022-01-08 NOTE — PLAN OF CARE
Problem: Suicide risk  Goal: Provide patient with safe environment  Description: Provide patient with safe environment  Outcome: Ongoing     Problem: Altered Mood, Depressive Behavior:  Goal: Able to verbalize acceptance of life and situations over which he or she has no control  Description: Able to verbalize acceptance of life and situations over which he or she has no control  Outcome: Ongoing  Goal: Ability to disclose and discuss suicidal ideas will improve  Description: Ability to disclose and discuss suicidal ideas will improve  Outcome: Ongoing  Goal: Absence of self-harm  Description: Absence of self-harm  Outcome: Ongoing   Patient reports suicidal thoughts denies intent or plan. He denies hallucinations and homicidal ideation. Patient is medication complaint social with select peers and behavior control.

## 2022-01-08 NOTE — BH NOTE
Emergency PRN Medication Administration Note:      Patient is Agitated as evidence by coming to nurses station and stating He is feeling agitated . Staff attempted to find and relieve the distress by Talking to patient Patient is currently  In behavioral control  accepted PRN medications. Medication Administered as prescribed: Zyprexa 5mg IM to Left Deltoid . Patient Tolerated medication administration. Will continue to monitor, offer support, and reassess.

## 2022-01-08 NOTE — PLAN OF CARE
Problem: Altered Mood, Depressive Behavior:  Goal: Ability to disclose and discuss suicidal ideas will improve  Description: Ability to disclose and discuss suicidal ideas will improve  1/8/2022 0936 by Louetta Felty, LPN  Outcome: Ongoing  Note: Patient has fleeting SI with no plan. Patient contracts for safety. Patient depressed and anxious. Patient denies any hallucinations. Patient cooperative with staff and compliant with medication. Problem: Altered Mood, Depressive Behavior:  Goal: Absence of self-harm  Description: Absence of self-harm  1/8/2022 0936 by Louetta Felty, LPN  Outcome: Ongoing  Note: Patient remains free from self harm. Will continue to monitor.

## 2022-01-08 NOTE — PLAN OF CARE
[facility]  Initial Interdisciplinary Treatment Plan NOTE      Original treatment plan Date & Time: 1/8/2022                           837AM    Admission Type:  Admission Type: Voluntary    Reason for admission:   Reason for Admission: suicidal ideation with plan to cut throat    Estimated Length of Stay:  5-7days  Estimated Discharge Date: to be determined by physician    PATIENT STRENGTHS:  Patient Strengths:Strengths: Communication,No significant Physical Illness  Patient Strengths and Limitations:Limitations: Tendency to isolate self,Apathetic / unmotivated  Addictive Behavior: Addictive Behavior  In the past 3 months, have you felt or has someone told you that you have a problem with:  : None  Do you have a history of Chemical Use?: No  Do you have a history of Alcohol Use?: No  Do you have a history of Street Drug Abuse?: No  Histroy of Prescripton Drug Abuse?: No  Medical Problems:  Past Medical History:   Diagnosis Date    Anxiety     Arthritis     Asthma     Bipolar I disorder, most recent episode (or current) depressed, unspecified 9/12/2014    Clostridium difficile infection     COPD (chronic obstructive pulmonary disease) ([address]     Depression     Disease of blood and blood forming organ     Eczema     Fracture, metacarpal     R 4th and 5th    Gastric ulcer     Gastritis 06/13/2018    GERD (gastroesophageal reflux disease)     GI bleed     H. 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[address]     Substance abuse ([address]     Hx of opiates, benzos, cocaine, alcohol abuse    Suicidal ideation     Suicide attempt by hanging ([address]     Tobacco dependence Ulcerative colitis ([facility]     UTI (urinary tract infection)      Status EXAM:Status and Exam  Normal: No  Facial Expression: Avoids Gaze  Affect: Appropriate  Level of Consciousness: Alert  Mood:Normal: No  Mood: Depressed,Anxious  Motor Activity:Normal: Yes  Interview Behavior: Cooperative  Preception: [place] to Person,[place] to Time,[place] to Place,[place] to Situation  Attention:Normal: No  Attention: Distractible  Thought Processes: Circumstantial  Thought Content:Normal: No  Thought Content: Preoccupations  Hallucinations: None  Delusions: No  Memory:Normal: Yes  Insight and Judgment: No  Insight and Judgment: Poor Insight  Present Suicidal Ideation: Yes (fleeting contracts for safety)  Present Homicidal Ideation: No    EDUCATION:   Learner Progress Toward Treatment Goals: reviewed group plans and strategies for care    Method:group therapy, medication compliance, individualized assessments and care planning    Outcome: needs reinforcement    PATIENT GOALS: to be discussed with patient within 72 hours    PLAN/TREATMENT RECOMMENDATIONS:     continue group therapy , medications compliance, goal setting, individualized assessments and care, continue to monitor pt on unit      SHORT-TERM GOALS:   Time frame for Short-Term Goals: 5-7 days    LONG-TERM GOALS:  Time frame for Long-Term Goals: 6 months  Members Present in Team Meeting: See Signature Sheet    Santos Hamper

## 2022-01-09 PROCEDURE — 99232 SBSQ HOSP IP/OBS MODERATE 35: CPT | Performed by: NURSE PRACTITIONER

## 2022-01-09 PROCEDURE — 2500000003 HC RX 250 WO HCPCS: Performed by: PSYCHIATRY & NEUROLOGY

## 2022-01-09 PROCEDURE — 2580000003 HC RX 258: Performed by: PSYCHIATRY & NEUROLOGY

## 2022-01-09 PROCEDURE — 6370000000 HC RX 637 (ALT 250 FOR IP): Performed by: PSYCHIATRY & NEUROLOGY

## 2022-01-09 PROCEDURE — 6370000000 HC RX 637 (ALT 250 FOR IP): Performed by: NURSE PRACTITIONER

## 2022-01-09 PROCEDURE — 1240000000 HC EMOTIONAL WELLNESS R&B

## 2022-01-09 RX ADMIN — HYDROXYZINE HYDROCHLORIDE 50 MG: 50 TABLET, FILM COATED ORAL at 20:31

## 2022-01-09 RX ADMIN — NICOTINE POLACRILEX 4 MG: 4 GUM, CHEWING BUCCAL at 14:46

## 2022-01-09 RX ADMIN — GABAPENTIN 800 MG: 400 CAPSULE ORAL at 17:19

## 2022-01-09 RX ADMIN — NICOTINE POLACRILEX 2 MG: 2 GUM, CHEWING BUCCAL at 18:22

## 2022-01-09 RX ADMIN — NICOTINE POLACRILEX 4 MG: 4 GUM, CHEWING BUCCAL at 11:53

## 2022-01-09 RX ADMIN — METFORMIN HYDROCHLORIDE 500 MG: 500 TABLET ORAL at 08:16

## 2022-01-09 RX ADMIN — BUPRENORPHINE AND NALOXONE 1 FILM: 8; 2 FILM BUCCAL; SUBLINGUAL at 08:16

## 2022-01-09 RX ADMIN — HYDROXYZINE HYDROCHLORIDE 50 MG: 50 TABLET, FILM COATED ORAL at 12:26

## 2022-01-09 RX ADMIN — SUCRALFATE 1 G: 1 TABLET ORAL at 17:20

## 2022-01-09 RX ADMIN — GABAPENTIN 800 MG: 400 CAPSULE ORAL at 08:15

## 2022-01-09 RX ADMIN — GABAPENTIN 800 MG: 400 CAPSULE ORAL at 12:24

## 2022-01-09 RX ADMIN — SUCRALFATE 1 G: 1 TABLET ORAL at 12:24

## 2022-01-09 RX ADMIN — NICOTINE POLACRILEX 4 MG: 4 GUM, CHEWING BUCCAL at 15:48

## 2022-01-09 RX ADMIN — TRAZODONE HYDROCHLORIDE 50 MG: 50 TABLET ORAL at 20:31

## 2022-01-09 RX ADMIN — SUCRALFATE 1 G: 1 TABLET ORAL at 08:16

## 2022-01-09 RX ADMIN — METFORMIN HYDROCHLORIDE 500 MG: 500 TABLET ORAL at 17:19

## 2022-01-09 RX ADMIN — SUCRALFATE 1 G: 1 TABLET ORAL at 20:31

## 2022-01-09 RX ADMIN — WATER 2.1 ML: 1 INJECTION INTRAMUSCULAR; INTRAVENOUS; SUBCUTANEOUS at 15:09

## 2022-01-09 RX ADMIN — PANTOPRAZOLE SODIUM 40 MG: 40 TABLET, DELAYED RELEASE ORAL at 08:16

## 2022-01-09 RX ADMIN — NICOTINE POLACRILEX 2 MG: 2 GUM, CHEWING BUCCAL at 20:50

## 2022-01-09 RX ADMIN — NICOTINE POLACRILEX 2 MG: 2 GUM, CHEWING BUCCAL at 19:32

## 2022-01-09 RX ADMIN — NICOTINE POLACRILEX 2 MG: 2 GUM, CHEWING BUCCAL at 17:20

## 2022-01-09 RX ADMIN — OLANZAPINE 5 MG: 10 INJECTION, POWDER, FOR SOLUTION INTRAMUSCULAR at 15:09

## 2022-01-09 RX ADMIN — NICOTINE POLACRILEX 4 MG: 4 GUM, CHEWING BUCCAL at 08:20

## 2022-01-09 RX ADMIN — GABAPENTIN 800 MG: 400 CAPSULE ORAL at 20:31

## 2022-01-09 RX ADMIN — BUPRENORPHINE AND NALOXONE 1 FILM: 8; 2 FILM BUCCAL; SUBLINGUAL at 20:31

## 2022-01-09 NOTE — GROUP NOTE
Group Therapy Note    Date: 2022    Group Start Time: 1030  Group End Time: 3670  Group Topic: Psychoeducation    [facility]    KRISH Collado LSW        Group Therapy Note    patient refused to attend psychoeducational group at 10:30am after encouragement from staff.            Patient's Goal:  ***    Notes:  ***    Status After Intervention:  {Status After Intervention:754029603}    Participation Level: {Participation Level:664907325}    Participation Quality: {[facility] PARTICIPATION QUALITY:204237570}      Speech:  {[facility] CD_SPEECH:82583}      Thought Process/Content: {Thought Process/Content:613696504}      Affective Functioning: {Affective Functionin}      Mood: {Mood:642975270}      Level of consciousness:  {Level of consciousness:643643691}      Response to Learning: {[facility] Responses to Learnin}      Endings: {[facility] Endings:99642}    Modes of Intervention: {[facility] Modes of Intervention:021505271}      Discipline Responsible: {[facility] Multidisciplinary:305850996}      Signature:  KRISH Collado LSW

## 2022-01-09 NOTE — PLAN OF CARE
[facility]  Day 3 Interdisciplinary Treatment Plan NOTE    Review Date & Time: 1/9/2022   1011    Admission Type:   Admission Type: Voluntary    Reason for admission:  Reason for Admission: suicidal ideation with plan to cut throat  Estimated Length of Stay: 5-7 days  Estimated Discharge Date Update: to be determined by physician    PATIENT STRENGTHS:  Patient Strengths Strengths: Communication,No significant Physical Illness  Patient Strengths and Limitations:Limitations: Perceives need for assistance with self-care,Multiple barriers to leisure interests,Inappropriate/potentially harmful leisure interests,Difficulty problem solving/relies on others to help solve problems  Addictive Behavior:Addictive Behavior  In the past 3 months, have you felt or has someone told you that you have a problem with:  : None  Do you have a history of Chemical Use?: No  Do you have a history of Alcohol Use?: No  Do you have a history of Street Drug Abuse?: No  Histroy of Prescripton Drug Abuse?: No  Medical Problems:  Past Medical History:   Diagnosis Date    Anxiety     Arthritis     Asthma     Bipolar I disorder, most recent episode (or current) depressed, unspecified 9/12/2014    Clostridium difficile infection     COPD (chronic obstructive pulmonary disease) ([address]     Depression     Disease of blood and blood forming organ     Eczema     Fracture, metacarpal     R 4th and 5th    Gastric ulcer     Gastritis 06/13/2018    GERD (gastroesophageal reflux disease)     GI bleed     H. 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[address]     Substance abuse ([address]     Hx of opiates, benzos, cocaine, alcohol abuse    Suicidal ideation     Suicide attempt by hanging ([facility]     Tobacco dependence     [facility]     UTI (urinary tract infection)        Risk:  Fall RiskTotal: 69  Ross Scale Ross Scale Score: 22  BVC    Change in scores no Changes to plan of Care no    Status EXAM:   Status and Exam  Normal: No  Facial Expression: Flat  Affect: Appropriate  Level of Consciousness: Alert  Mood:Normal: No  Mood: Depressed  Motor Activity:Normal: Yes  Interview Behavior: Cooperative  Preception: [place] to Person,[place] to Time,[place] to Place,[place] to Situation  Attention:Normal: No  Attention: Distractible  Thought Processes: Circumstantial  Thought Content:Normal: No  Thought Content: Preoccupations  Hallucinations: None  Delusions: No  Memory:Normal: Yes  Insight and Judgment: No  Insight and Judgment: Poor Judgment  Present Suicidal Ideation: Yes (fleeting suicidal thoughts contracts for safety)  Present Homicidal Ideation: No    Daily Assessment Last Entry:   Daily Sleep (WDL): Within Defined Limits         Patient Currently in Pain: Denies  Daily Nutrition (WDL): Within Defined Limits    Patient Monitoring:  Frequency of Checks: 4 times per hour, close    Psychiatric Symptoms:   Depression Symptoms  Depression Symptoms: Isolative,Impaired concentration  Anxiety Symptoms  Anxiety Symptoms: Generalized  Elizabeth Symptoms  Elizabeth Symptoms: No problems reported or observed. Psychosis Symptoms  Delusion Type: No problems reported or observed. Suicide Risk CSSR-S:  1) Within the past month, have you wished you were dead or wished you could go to sleep and not wake up? : Yes  2) Have you actually had any thoughts of killing yourself? : Yes  3) Have you been thinking about how you might kill yourself? : Yes  5) Have you started to work out or worked out the details of how to kill yourself?  Do you intend to carry out this plan? : No  6) Have you ever done anything, started to do

## 2022-01-09 NOTE — PLAN OF CARE
Problem: Suicide risk  Goal: Provide patient with safe environment  Description: Provide patient with safe environment  1/9/2022 1042 by Danilo Diaz RN  Outcome: Ongoing  Note: Pt is rounded on every 15 minutes to provide pt with a safe environment. Problem: Altered Mood, Depressive Behavior:  Goal: Ability to disclose and discuss suicidal ideas will improve  Description: Ability to disclose and discuss suicidal ideas will improve  1/9/2022 1042 by Danilo Diaz RN  Outcome: Ongoing  Note: Pt reports to having fleeting suicidal ideation. Pt denies having homicidal ideation. Pt is cooperative with staff and compliant with medical treatment. Pt reports having adequate diet but reports having poor sleep due to nightmares. Pt was frequently up during the night. Pt is social with select peers. Problem: Altered Mood, Depressive Behavior:  Goal: Absence of self-harm  Description: Absence of self-harm  1/9/2022 1042 by Danilo Diaz RN  Outcome: Ongoing  Note: Pt remains free of self inflicted harm.

## 2022-01-09 NOTE — PLAN OF CARE
Problem: Suicide risk  Goal: Provide patient with safe environment  Description: Provide patient with safe environment  1/8/2022 2108 by Aparna Cervantes RN  Outcome: Ongoing  Note: Staff provides patient with a safe and therapeutic environment. Problem: Altered Mood, Depressive Behavior:  Goal: Able to verbalize acceptance of life and situations over which he or she has no control  Description: Able to verbalize acceptance of life and situations over which he or she has no control  1/8/2022 2108 by Aparna Cervantes RN  Outcome: Ongoing     Problem: Altered Mood, Depressive Behavior:  Goal: Ability to disclose and discuss suicidal ideas will improve  Description: Ability to disclose and discuss suicidal ideas will improve  1/8/2022 2108 by Aparna Cervantes RN  Outcome: Ongoing  Note: Patient is out in day room, passing the unit. He is selective social with peers. He reports fleeting suicidal thoughts, contracts for safety while on unit. Patient reports symptoms of anxiety and requested as needed Atarax which is effective. Staff maintains Q 15 minute safety checks. Problem: Tobacco Use:  Goal: Inpatient tobacco use cessation counseling participation  Description: Inpatient tobacco use cessation counseling participation  1/8/2022 2108 by Aparna Cervantes RN  Outcome: Ongoing  Note: Patient given tobacco quitline number 74192125827 at this time, refusing to call at this time, states \" I just dont want to quit now\"- patient given information as to the dangers of long term tobacco use. Continue to reinforce the importance of tobacco cessation. Problem: Altered Mood, Depressive Behavior:  Goal: Absence of self-harm  Description: Absence of self-harm  1/8/2022 2108 by Aparna Cervantes RN  Outcome: Ongoing  Note: Patient remains absent of self harm.

## 2022-01-09 NOTE — BH NOTE
patient refused to attend open rec group at 1400 after encouragement from staff.   1:1 talk time provided as alternative to group session

## 2022-01-09 NOTE — GROUP NOTE
Group Therapy Note    Date: 1/9/2022    Group Start Time: 1030  Group End Time: 2718  Group Topic: Psychoeducation    KRISH Alcazar LSW        Group Therapy Note    patient refused to attend psychoeducational group at 10:30am after encouragement from staff.          Signature:  KRISH Carrasco LSW

## 2022-01-10 VITALS
BODY MASS INDEX: 32.58 KG/M2 | SYSTOLIC BLOOD PRESSURE: 122 MMHG | OXYGEN SATURATION: 97 % | WEIGHT: 220 LBS | RESPIRATION RATE: 16 BRPM | HEART RATE: 87 BPM | HEIGHT: 69 IN | TEMPERATURE: 98.2 F | DIASTOLIC BLOOD PRESSURE: 67 MMHG

## 2022-01-10 PROCEDURE — 6370000000 HC RX 637 (ALT 250 FOR IP): Performed by: PSYCHIATRY & NEUROLOGY

## 2022-01-10 PROCEDURE — 99239 HOSP IP/OBS DSCHRG MGMT >30: CPT | Performed by: PSYCHIATRY & NEUROLOGY

## 2022-01-10 RX ORDER — OLANZAPINE 15 MG/1
15 TABLET ORAL NIGHTLY
Qty: 30 TABLET | Refills: 3 | Status: ON HOLD | OUTPATIENT
Start: 2022-01-10 | End: 2022-04-08 | Stop reason: HOSPADM

## 2022-01-10 RX ORDER — PANTOPRAZOLE SODIUM 40 MG/1
40 TABLET, DELAYED RELEASE ORAL
Qty: 30 TABLET | Refills: 0 | Status: SHIPPED | OUTPATIENT
Start: 2022-01-10 | End: 2022-01-14 | Stop reason: SDUPTHER

## 2022-01-10 RX ADMIN — NICOTINE POLACRILEX 2 MG: 2 GUM, CHEWING BUCCAL at 08:43

## 2022-01-10 RX ADMIN — BUPRENORPHINE AND NALOXONE 1 FILM: 8; 2 FILM BUCCAL; SUBLINGUAL at 08:33

## 2022-01-10 RX ADMIN — METFORMIN HYDROCHLORIDE 500 MG: 500 TABLET ORAL at 08:33

## 2022-01-10 RX ADMIN — NICOTINE POLACRILEX 2 MG: 2 GUM, CHEWING BUCCAL at 06:00

## 2022-01-10 RX ADMIN — SUCRALFATE 1 G: 1 TABLET ORAL at 08:33

## 2022-01-10 RX ADMIN — GABAPENTIN 800 MG: 400 CAPSULE ORAL at 08:33

## 2022-01-10 NOTE — PLAN OF CARE
Problem: Suicide risk  Goal: Provide patient with safe environment  Description: Provide patient with safe environment  1/9/2022 2302 by Igor Syed RN  Outcome: Ongoing  Note: Staff provides patient with a safe and therapeutic environment. Problem: Altered Mood, Depressive Behavior:  Goal: Ability to disclose and discuss suicidal ideas will improve  Description: Ability to disclose and discuss suicidal ideas will improve  1/9/2022 2302 by gIor Syed RN  Outcome: Ongoing  Note: Patient is flat on approach, he paces the day room at times. He is selectively social with peers. Patient refused Zyprexa stating he doesn't like the way it made him feel. When asked what he meant by that patient stated he felt \" woozie\". Patient reports fleeting suicidal thoughts without a plan and contracts for safety while on unit. Patient continues to report depression and anxiety. Staff maintains q 15 minute safety checks.

## 2022-01-10 NOTE — BH NOTE
Patient given tobacco quitline number 71922259372 at this time, refusing to call at this time, states \" I just dont want to quit now\"- patient given information as to the dangers of long term tobacco use. Continue to reinforce the importance of tobacco cessation.

## 2022-01-10 NOTE — PROGRESS NOTES
Daily Progress Note  1/9/2022    Patient Name: Ellis Giles    CHIEF COMPLAINT: Depression with suicidal ideation         SUBJECTIVE:      Patient is seen today for a follow up assessment. Laly Hendricksontorres is interviewed during dinnertime, he reports that he is \"better\" and wondering when he will be able to discharge. He reports that he plans to contact sober living facilities in the morning to arrange appropriate placement. He reports that he did sleep well last night. He tolerated the increased dose of Zyprexa. He continues to feel that he would be better served by receiving Benadryl over the Zyprexa. We again discussed that he would not receive Benadryl and that we would make no changes to his current dose of Zyprexa and monitor for continued stability of symptoms. He reports improvement in his suicidal ideation and remains able to verbally contract for safety. Appetite:  [x] Adequate/Unchanged  [] Increased  [] Decreased      Sleep:       [x] Adequate/Unchanged  [] Fair  [] Poor, improved    Group Attendance on Unit:   [] Yes  [] Selectively    [x] No    Medication Side Effects: Denies         Mental Status Exam  Level of consciousness: Alert and awake   Appearance: Appropriate attire for setting, seated in chair, with fair  grooming and hygiene   Behavior/Motor: Approachable, eating dinner   attitude toward examiner: Cooperative, attentive, good eye contact  Speech: Rapid, normal volume and tone  Mood: \"Better\"  Affect: Congruent, less persistent and anxious  Thought processes: Linear and coherent  Thought content: Remains medication focused, Denies homicidal ideation  Suicidal Ideation: Improving suicidal ideations, contracts for safety on the unit. Delusions: No evidence of delusions. Perceptual Disturbance: Denies, patient does not appear to be responding to internal stimuli.    Cognition: Oriented to self, location, time, and situation  Memory: intact  Insight: poor   Judgement: poor       Data height is 5' 9\" (1.753 m) and weight is 220 lb (99.8 kg). His oral temperature is 98 °F (36.7 °C). His blood pressure is 116/83 and his pulse is 98. His respiration is 14 and oxygen saturation is 97%. Labs:   Admission on 01/07/2022   Component Date Value Ref Range Status    Ethanol 01/07/2022 <10  <10 mg/dL Final    Ethanol percent 01/07/2022 <0.010  % Final    Amphetamine Screen, Ur 01/07/2022 NEGATIVE  NEGATIVE Final    Comment:       (Positive cutoff 1000 ng/mL)                  Barbiturate Screen, Ur 01/07/2022 POSITIVE* NEGATIVE Final    Comment:       (Positive cutoff 200 ng/mL)                  Benzodiazepine Screen, Urine 01/07/2022 POSITIVE* NEGATIVE Final    Comment:       (Positive cutoff 200 ng/mL)                  Cocaine Metabolite, Urine 01/07/2022 POSITIVE* NEGATIVE Final    Comment:       (Positive cutoff 300 ng/mL)                  Methadone Screen, Urine 01/07/2022 NEGATIVE  NEGATIVE Final    Comment:       (Positive cutoff 300 ng/mL)                  Opiates, Urine 01/07/2022 NEGATIVE  NEGATIVE Final    Comment:       (Positive cutoff 300 ng/mL)                  Phencyclidine, Urine 01/07/2022 NEGATIVE  NEGATIVE Final    Comment:       (Positive cutoff 25 ng/mL)                  Propoxyphene, Urine 01/07/2022 NOT REPORTED  NEGATIVE Final    Cannabinoid Scrn, Ur 01/07/2022 NEGATIVE  NEGATIVE Final    Comment:       (Positive cutoff 50 ng/mL)                  Oxycodone Screen, Ur 01/07/2022 NEGATIVE  NEGATIVE Final    Comment:       (Positive cutoff 100 ng/mL)                  Methamphetamine, Urine 01/07/2022 NOT REPORTED  NEGATIVE Final    Tricyclic Antidepressants, Urine 01/07/2022 NOT REPORTED  NEGATIVE Final    MDMA, Urine 01/07/2022 NOT REPORTED  NEGATIVE Final    Buprenorphine Urine 01/07/2022 NOT REPORTED  NEGATIVE Final    Test Information 01/07/2022 Assay provides medical screening only.   The absence of expected drug(s) and/or metabolite(s) may indicate diluted or adulterated urine, limitations of testing or timing of collection. Final    Comment: Testing for legal purposes should be confirmed by another method. To request confirmation   of test result, please call the lab within 7 days of sample submission.  Acetaminophen Level 01/07/2022 <5* 10 - 30 ug/mL Final    Salicylate Lvl 76/87/9852 <1* 3 - 10 mg/dL Final    Specimen Description 01/07/2022 . NASOPHARYNGEAL SWAB   Final    SARS-CoV-2, Rapid 01/07/2022 Not Detected  Not Detected Final    Comment:       Rapid NAAT:  The specimen is NEGATIVE for SARS-CoV-2, the novel coronavirus associated with   COVID-19. The ID NOW COVID-19 assay is designed to detect the virus that causes COVID-19 in patients   with signs and symptoms of infection who are suspected of COVID-19. An individual without symptoms of COVID-19 and who is not shedding SARS-CoV-2 virus would   expect to have a negative (not detected) result in this assay. Negative results should be treated as presumptive and, if inconsistent with clinical signs   and symptoms or necessary for patient management,  should be tested with an alternative molecular assay. Negative results do not preclude   SARS-CoV-2 infection and   should not be used as the sole basis for patient management decisions. Fact sheet for Healthcare Providers: Jyothi.margo  Fact sheet for Patients: Jyothi.margo          Methodology: Isothermal Nucleic Acid Amplification      POC Glucose 01/07/2022 91  75 - 110 mg/dL Final         Reviewed patient's current plan of care and vital signs with nursing staff.     Labs reviewed: [x] Yes    Medications  Current Facility-Administered Medications: nicotine polacrilex (NICORETTE) gum 2 mg, 2 mg, Oral, PRN  chlorproMAZINE (THORAZINE) tablet 50 mg, 50 mg, Oral, BID PRN  OLANZapine (ZYPREXA) tablet 15 mg, 15 mg, Oral, Nightly  buprenorphine-naloxone (SUBOXONE) 8-2 MG SL film 1 Film, 1 Film, SubLINGual, BID  gabapentin (NEURONTIN) capsule 800 mg, 800 mg, Oral, 4x Daily  metFORMIN (GLUCOPHAGE) tablet 500 mg, 500 mg, Oral, BID WC  pantoprazole (PROTONIX) tablet 40 mg, 40 mg, Oral, QAM AC  sucralfate (CARAFATE) tablet 1 g, 1 g, Oral, 4x Daily  hydrOXYzine (ATARAX) tablet 50 mg, 50 mg, Oral, TID PRN  traZODone (DESYREL) tablet 50 mg, 50 mg, Oral, Nightly PRN  OLANZapine (ZYPREXA) injection 5 mg, 5 mg, IntraMUSCular, BID PRN **AND** sterile water injection 2.1 mL, 2.1 mL, IntraMUSCular, BID PRN    ASSESSMENT  MDD (major depressive disorder), recurrent severe, without psychosis ([facility]         PLAN  Patient symptoms are: Improving  Continue current medication regimen and observe  Monitor need and frequency of PRN medications. Encourage participation in groups and milieu. Attempt to develop insight. Psycho-education conducted. Supportive Therapy conducted. Probable discharge is per attending physician. Follow-up daily while inpatient. Patient continues to be monitored in the inpatient psychiatric facility at [facility] for safety and stabilization. Patient continues to need, on a daily basis, active treatment furnished directly by or requiring the supervision of inpatient psychiatric personnel. Electronically signed by MASSIEL Orr CNP on 1/9/2022 at 8:18 PM    **This report has been created using voice recognition software. It may contain minor errors which are inherent in voice recognition technology. **

## 2022-01-10 NOTE — GROUP NOTE
Group Therapy Note    Date: 1/10/2022    Group Start Time: 1100  Group End Time: 1130  Group Topic: Cognitive Skills    TIESHA Ruff, CTRS    Pt did not attend 1100 cognitive skills group d/t resting in room despite staff invitation to attend. 1:1 talk time offered as alternative to group session, pt declined.               Signature:  Debra Tenorio

## 2022-01-10 NOTE — CARE COORDINATION
CLIENT ISSUE:  - This writer informed this client has been \"cheeking\" his Suboxone medication with another client on the unit. - At time of assessment client was provided with AOD information and has not contacted any treatment facility. On this date, Nica Cervantes asks client where he plans to go at discharge. Client states \"the doctor told me you were going to find me a place to go. \"  Writer informs client this was not told to him and to clarify, he needs to make contacts with treatment facilities. - Client advises writer he has not called anyone. Writer advises client he is getting discharged on this date and needs to reach out to his emergency contacts. - Client leaves the office.    - At time of discharge client reports he can't go to the address on his face-sheet and refuses to go to the [place] [address]. Client tells nurse he want to be taken to [facility], or the [facility]. Client became argumentative with staff and started splitting staff. - Client continues to be demanding and rude to staff members prior to his discharge.

## 2022-01-10 NOTE — GROUP NOTE
Group Therapy Note    Date: 1/10/2022    Group Start Time: 1000  Group End Time: 1030  Group Topic: Psychotherapy    TIESHA Hernandez        Group Therapy Note           Patient refused to attend psychotherapy group after encouragement from staff. 1:1 talk time offered but refused. Signature:   Felix Hernandez

## 2022-01-10 NOTE — DISCHARGE SUMMARY
DISCHARGE SUMMARY      Patient ID:  Trent Antonio  [id number]  48 y.o.  1990    Admit date: 1/7/2022    Discharge date and time: 1/10/2022    Disposition: Home     Admitting Physician: Melania De Leon MD     Discharge Physician: Dr Gerardo Wilhelm MD    Admission Diagnoses: Depression with suicidal ideation [F32. A, R45.851]    Admission Condition: poor    Discharged Condition: stable    Admission Circumstance: Trent Antonio is a 32 y.o. male who has a past medical history of depression, polysubstance abuse, schizoaffective disorder and malingering who presented with suicidal ideation  Per ED records, \"   32 y. o. male presents with c/o suicidal thoughts.  The patient brought himself to the emergency department.  The patient reports feeling depressed, overwhelmed and having thought of suicide for the last few days.  The patient reports he tried to overdose on fentanyl a few days ago that was not successful. Maggietigre Kimi states he has been  Thinking of cutting his throat.   The patient denies a h/o of previous suicide attempt.  The patient reports that their symptoms were provoked by his girlfriend leaving him.  The patient Onesimo Bacade a h/o of previous psychiatric admission most recently in November of 2021.  The patient denies drug use outside of the recent fentanyl use.   The patient denies medical complaints at this time.     At diagnostic assessment patient is distressed, polite and soft-spoken. He reports his depressive symptoms have been severe. The patient is known to me from his multiple previous admissions. He has a pattern of seeking sedating medications including antihistamines.     The patient states that he is feeling increasingly depressed because of a recent break-up with his girlfriend. He reports a recent suicide attempt by trying to take an overdose of opioids. The patient is denying any auditory visual hallucinations or psychotic phenomena.   We discussed that I reviewed the patient's PDMP and we will continue his Suboxone and gabapentin but I will not be giving him any prescriptions of either of these medications. The patient states that he has been taking them as prescribed. He was also not received any sedating medications. Indications and risk benefit analysis of olanzapine were discussed with the patient in detail.  . It has  Been ordered for him.            PAST MEDICAL/PSYCHIATRIC HISTORY:   Past Medical History:   Diagnosis Date    Anxiety     Arthritis     Asthma     Bipolar I disorder, most recent episode (or current) depressed, unspecified 9/12/2014    Clostridium difficile infection     COPD (chronic obstructive pulmonary disease) ([address]     Depression     Disease of blood and blood forming organ     Eczema     Fracture, metacarpal     R 4th and 5th    Gastric ulcer     Gastritis 06/13/2018    GERD (gastroesophageal reflux disease)     GI bleed     H. 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[address]     Substance abuse ([address]     Hx of opiates, benzos, cocaine, alcohol abuse    Suicidal ideation     Suicide attempt by hanging ([address]     Tobacco dependence     Ulcerative colitis ([address]     UTI (urinary tract infection)        FAMILY/SOCIAL HISTORY:  Family History   Problem Relation Age of Onset    Diabetes Father     Alcohol Abuse Father     Depression Father     Arthritis Father     High Blood Pressure Father     Other Father         aneurysm & epilepsy    Migraines Father     Arthritis Mother     Other Mother         aneurysm & epilepsy    Migraines Mother     Diabetes Brother         Aunt and uncles    Depression Brother     Mental Illness Brother     Other Brother         epilepsy    Migraines Brother     Stroke Other         Uncle    Other Brother         murdered Oct 6th, 2014    Colon Cancer Paternal Cousin 43    Other Sister         epilepsy    Migraines Sister      Social History     Socioeconomic History    Marital status: Single     Spouse name: Not on file    Number of children: Not on file    Years of education: Not on file    Highest education level: Not on file   Occupational History    Occupation: disability   Tobacco Use    Smoking status: Current Every Day Smoker     Packs/day: 0.50     Years: 15.00     Pack years: 7.50     Types: Cigarettes    Smokeless tobacco: Never Used   Vaping Use    Vaping Use: Former   Substance and Sexual Activity    Alcohol use: Yes     Comment: refused to answer how much but states he has been partaking more since his discharge from tx    Drug use: Yes     Types: Opiates , Cocaine     Comment: Hx of opiates, benzos, cocaine, alcohol abuse    Sexual activity: Yes     Partners: Female     Comment: Lives alone, not working. Other Topics Concern    Not on file   Social History Narrative    ** Merged History Encounter **          Social Determinants of Health     Financial Resource Strain: Medium Risk    Difficulty of Paying Living Expenses: Somewhat hard   Food Insecurity: No Food Insecurity    Worried About Running Out of Food in the Last Year: Never true    Micaela of Food in the Last Year: Never true   Transportation Needs: No Transportation Needs    Lack of Transportation (Medical): No    Lack of Transportation (Non-Medical):  No   Physical Activity: Inactive    Days of Exercise per Week: 0 days    Minutes of Exercise per Session: 0 min   Stress: Stress Concern Present    Feeling of Stress : Rather much   Social Connections: Socially Isolated    Frequency of Communication with Friends and Family: Never    Frequency of Social Gatherings with Friends and Family: Never    Attends Restorationism Services: Never    Active Member of Clubs or Organizations: No    Attends Club or Organization Meetings: Never    Marital Status: Never    Intimate Partner Violence: Not At Risk    Fear of Current or Ex-Partner: No    Emotionally Abused: No    Physically Abused: No    Sexually Abused: No   Housing Stability: Low Risk     Unable to Pay for Housing in the Last Year: No    Number of [place] in the Last Year: 1    Unstable Housing in the Last Year: No       MEDICATIONS:    Current Facility-Administered Medications:     nicotine polacrilex (NICORETTE) gum 2 mg, 2 mg, Oral, PRN, Amilcar Dunn MD, 2 mg at 01/10/22 0843    chlorproMAZINE (THORAZINE) tablet 50 mg, 50 mg, Oral, BID PRN, MASSIEL Jauregui - CNP    OLANZapine (ZYPREXA) tablet 15 mg, 15 mg, Oral, Nightly, MASSIEL Jauregui - CNP, 15 mg at 01/08/22 2032    buprenorphine-naloxone (SUBOXONE) 8-2 MG SL film 1 Film, 1 Film, SubLINGual, BID, Amilcar Dunn MD, 1 Film at 01/10/22 4075    gabapentin (NEURONTIN) capsule 800 mg, 800 mg, Oral, 4x Daily, Amilcar Dunn MD, 800 mg at 01/10/22 0833    metFORMIN (GLUCOPHAGE) tablet 500 mg, 500 mg, Oral, BID WC, Amilcar Dunn MD, 500 mg at 01/10/22 0833    pantoprazole (PROTONIX) tablet 40 mg, 40 mg, Oral, QAM AC, Amilcar Dunn MD, 40 mg at 01/09/22 0816    sucralfate (CARAFATE) tablet 1 g, 1 g, Oral, 4x Daily, Amilcar Dunn MD, 1 g at 01/10/22 3772    hydrOXYzine (ATARAX) tablet 50 mg, 50 mg, Oral, TID PRN, Amilcar Dunn MD, 50 mg at 01/09/22 2031    traZODone (DESYREL) tablet 50 mg, 50 mg, Oral, Nightly PRN, Amilcar Dunn MD, 50 mg at 01/09/22 2031    OLANZapine (ZYPREXA) injection 5 mg, 5 mg, IntraMUSCular, BID PRN, 5 mg at 01/09/22 1509 **AND** sterile water injection 2.1 mL, 2.1 mL, IntraMUSCular, BID PRN, Amilcar Dunn MD, 2.1 mL at 01/09/22 1509    Examination:  /67   Pulse 87   Temp 98.2 °F (36.8 °C) (Oral)   Resp 16   Ht 5' 9\" (1.753 m)   Wt 220 lb (99.8 kg)   SpO2 97%   BMI 32.49 kg/m²   Gait - steady    HOSPITAL COURSE[de-identified]  Following admission to the hospital, patient had a complete physical exam and blood work up, which was unremarkable. Patient was monitored closely with suicide precaution  Patient was started on his prior to admission medications noted above. His gabapentin and Suboxone were continued but it was discussed that he would not be given a prescription on discharge as he should have an adequate supply from his recent prescription. The patient was suspected of cheeking his pills and given his medication to another patient. Was encouraged to participate in group and other milieu activity  Patient started to feel better with this combination of treatment. Significant progress in the symptoms since admission. Mood is improved  The patient denies AVH or paranoid thoughts  The patient denies any hopelessness or worthlessness  No active SI/HI  Appetite:  [x] Normal  [] Increased  [] Decreased    Sleep:       [x] Normal  [] Fair       [] Poor            Energy:    [x] Normal  [] Increased  [] Decreased     SI [] Present  [x] Absent  HI  []Present  [x] Absent   Aggression:  [] yes  [] no  Patient is [x] able  [] unable to CONTRACT FOR SAFETY   Medication side effects(SE):  [x] None(Psych.  Meds.) [] Other      Mental Status Examination on discharge:    Level of consciousness:  within normal limits   Appearance:  well-appearing  Behavior/Motor:  no abnormalities noted  Attitude toward examiner:  attentive and good eye contact  Speech:  spontaneous, normal rate and normal volume   Mood: constricted  Affect:  mood congruent  Thought processes:  linear, goal directed and coherent   Thought content:  Suicidal Ideation:  denies suicidal ideation  Delusions:  no evidence of delusions  Perceptual Disturbance:  denies any perceptual disturbance  Cognition:  oriented to person, place, and time   Concentration intact  Memory intact  Insight good   Judgement fair   Fund of Knowledge adequate      ASSESSMENT:  Patient symptoms are:  [x] Well controlled  [x] Improving  [] Worsening  [] No change      Diagnosis:  Principal Problem:    MDD (major depressive disorder), recurrent severe, without psychosis ([facility]  Resolved Problems:    * No resolved hospital problems. *      LABS:    No results for input(s): WBC, HGB, PLT in the last 72 hours. No results for input(s): NA, K, CL, CO2, BUN, CREATININE, GLUCOSE in the last 72 hours. No results for input(s): BILITOT, ALKPHOS, AST, ALT in the last 72 hours. Lab Results   Component Value Date    LABAMPH NEG 12/27/2021    BARBSCNU POSITIVE 01/07/2022    LABBENZ POSITIVE 01/07/2022    LABBENZ NEGATIVE 06/12/2013    LABMETH NEGATIVE 01/07/2022    OPIATESCREENURINE NEG 12/27/2021    PHENCYCLIDINESCREENURINE N/A 12/27/2021    PPXUR NOT REPORTED 01/07/2022     Lab Results   Component Value Date    TSH 0.59 03/28/2018     No results found for: LITHIUM  No results found for: VALPROATE, CBMZ    RISK ASSESSMENT AT DISCHARGE: Low risk for suicide and homicide. Treatment Plan:  Reviewed current Medications with the patient. Education provided on the complaince with treatment. Risks, benefits, side effects, drug-to-drug interactions and alternatives to treatment were discussed. Encourage patient to attend outpatient follow up appointment and therapy. Patient was advised to call the outpatient provider, visit the nearest ED or call 911 if symptoms are not manageable. Medication List      START taking these medications    OLANZapine 15 MG tablet  Commonly known as: ZYPREXA  Take 1 tablet by mouth nightly  Notes to patient: Mood/clear thoughts        CONTINUE taking these medications    buprenorphine-naloxone 8-2 MG Film SL film  Commonly known as: SUBOXONE  Place 1 Film under the tongue 2 times daily for 7 days.   Notes to patient: Opoid addiction     chlorproMAZINE 50 MG tablet  Commonly known as: THORAZINE  Take 1 tablet by mouth 2 times daily as needed (anxiety)     gabapentin 800 MG tablet  Commonly known as: Neurontin  Take 1 tablet by mouth 4 times daily for 7 days. Notes to patient: pain     metFORMIN 500 MG tablet  Commonly known as: GLUCOPHAGE  Take 1 tablet by mouth 2 times daily (with meals)  Notes to patient: Blood glucose     pantoprazole 40 MG tablet  Commonly known as: PROTONIX  Take 1 tablet by mouth every morning (before breakfast)  Notes to patient: indigestion     sucralfate 1 GM tablet  Commonly known as: Carafate  Take 1 tablet by mouth 4 times daily  Notes to patient: indigestion     traZODone 50 MG tablet  Commonly known as: DESYREL  Take 1 tablet by mouth nightly as needed for Sleep  Notes to patient: Sleep aid        STOP taking these medications    ALPRAZolam 0.25 MG tablet  Commonly known as: Xanax     cloNIDine 0.1 MG tablet  Commonly known as: CATAPRES     meloxicam 7.5 MG tablet  Commonly known as: Mobic  Notes to patient: pain     promethazine 25 MG tablet  Commonly known as: PHENERGAN     QUEtiapine 100 MG tablet  Commonly known as: SEROQUEL           Where to Get Your Medications      These medications were sent to [facility] [phone]  [place] [address]    Phone: [phone]   · metFORMIN 500 MG tablet  · OLANZapine 15 MG tablet  · pantoprazole 40 MG tablet               Core Measures statement:   Not applicable      TIME SPENT - 28 MINUTES TO COMPLETE THE EVALUATION, DISCHARGE SUMMARY, MEDICATION RECONCILIATION AND FOLLOW UP CARE                                         Brittani Dominguez is a 32 y.o. male being evaluated Paul Posaad MD on 1/10/2022 at 11:00 AM    An electronic signature was used to authenticate this note. **This report has been created using voice recognition software.  It may contain minor errors which are inherent in voice recognition technology. **

## 2022-01-11 ENCOUNTER — CARE COORDINATION (OUTPATIENT)
Dept: CARE COORDINATION | Age: 32
End: 2022-01-11

## 2022-01-11 NOTE — CARE COORDINATION
Attempted to reach pt. Message received that the subscriber you have dialed is not in service. No other number listed. Pt was d/c from [facility] yesterday and provided with an appt date and time to f/u with [facility] to be linked for all services: mental health, substance abuse. They also will be able to connect pt with primary care services. Pt's appt is 1/18 at 1030am.    Pt has been dismissed from [facility]. Will d/c from care coordination at this time.

## 2022-01-14 ENCOUNTER — HOSPITAL ENCOUNTER (EMERGENCY)
Age: 32
Discharge: HOME | End: 2022-01-14
Attending: STUDENT IN AN ORGANIZED HEALTH CARE EDUCATION/TRAINING PROGRAM
Payer: MEDICARE

## 2022-01-14 VITALS
TEMPERATURE: 97.9 F | RESPIRATION RATE: 20 BRPM | DIASTOLIC BLOOD PRESSURE: 105 MMHG | OXYGEN SATURATION: 96 % | BODY MASS INDEX: 34.84 KG/M2 | SYSTOLIC BLOOD PRESSURE: 170 MMHG | WEIGHT: 235.9 LBS | HEART RATE: 91 BPM

## 2022-01-14 DIAGNOSIS — R10.13 ABDOMINAL PAIN, EPIGASTRIC: Primary | ICD-10-CM

## 2022-01-14 PROCEDURE — 96372 THER/PROPH/DIAG INJ SC/IM: CPT

## 2022-01-14 PROCEDURE — 6360000002 HC RX W HCPCS: Performed by: STUDENT IN AN ORGANIZED HEALTH CARE EDUCATION/TRAINING PROGRAM

## 2022-01-14 PROCEDURE — 6370000000 HC RX 637 (ALT 250 FOR IP): Performed by: STUDENT IN AN ORGANIZED HEALTH CARE EDUCATION/TRAINING PROGRAM

## 2022-01-14 PROCEDURE — 99282 EMERGENCY DEPT VISIT SF MDM: CPT

## 2022-01-14 RX ORDER — ONDANSETRON 4 MG/1
4 TABLET, ORALLY DISINTEGRATING ORAL EVERY 8 HOURS PRN
Qty: 12 TABLET | Refills: 0 | Status: ON HOLD | OUTPATIENT
Start: 2022-01-14 | End: 2022-04-06 | Stop reason: ALTCHOICE

## 2022-01-14 RX ORDER — PROMETHAZINE HYDROCHLORIDE 12.5 MG/1
TABLET ORAL
Status: ON HOLD | COMMUNITY
Start: 2021-12-10 | End: 2022-04-06 | Stop reason: ALTCHOICE

## 2022-01-14 RX ORDER — AZITHROMYCIN 250 MG/1
TABLET, FILM COATED ORAL
Status: ON HOLD | COMMUNITY
Start: 2021-11-22 | End: 2022-04-06 | Stop reason: ALTCHOICE

## 2022-01-14 RX ORDER — LORAZEPAM 1 MG/1
1 TABLET ORAL ONCE
Status: COMPLETED | OUTPATIENT
Start: 2022-01-14 | End: 2022-01-14

## 2022-01-14 RX ORDER — DIPHENHYDRAMINE HYDROCHLORIDE 50 MG/ML
25 INJECTION INTRAMUSCULAR; INTRAVENOUS ONCE
Status: COMPLETED | OUTPATIENT
Start: 2022-01-14 | End: 2022-01-14

## 2022-01-14 RX ORDER — PROCHLORPERAZINE EDISYLATE 5 MG/ML
10 INJECTION INTRAMUSCULAR; INTRAVENOUS ONCE
Status: COMPLETED | OUTPATIENT
Start: 2022-01-14 | End: 2022-01-14

## 2022-01-14 RX ORDER — PANTOPRAZOLE SODIUM 40 MG/1
40 TABLET, DELAYED RELEASE ORAL
Qty: 30 TABLET | Refills: 0 | Status: ON HOLD | OUTPATIENT
Start: 2022-01-14 | End: 2022-04-06 | Stop reason: ALTCHOICE

## 2022-01-14 RX ADMIN — LORAZEPAM 1 MG: 1 TABLET ORAL at 23:18

## 2022-01-14 RX ADMIN — DIPHENHYDRAMINE HYDROCHLORIDE 25 MG: 50 INJECTION, SOLUTION INTRAMUSCULAR; INTRAVENOUS at 23:18

## 2022-01-14 RX ADMIN — PROCHLORPERAZINE EDISYLATE 10 MG: 5 INJECTION INTRAMUSCULAR; INTRAVENOUS at 23:19

## 2022-01-14 ASSESSMENT — PAIN SCALES - GENERAL: PAINLEVEL_OUTOF10: 7

## 2022-01-15 ASSESSMENT — ENCOUNTER SYMPTOMS
EYE DISCHARGE: 0
VOMITING: 1
NAUSEA: 1
EYE REDNESS: 0
COLOR CHANGE: 0
ABDOMINAL PAIN: 1
SHORTNESS OF BREATH: 0

## 2022-01-15 NOTE — ED NOTES
Pt presents to ED with c/o of Anxiety, high blood pressure. Pt states he has been throwing up for the past 3 days and has noticed some blood with it. Pt alert and oriented. Warm blanket given for comfort.  Pt able to ambulate to room without assist.      Guillermina Braxton RN  01/14/22 6885

## 2022-01-15 NOTE — ED PROVIDER NOTES
[facility] ED  Emergency Department Encounter     Pt Name: Jaiden Verduzco  MRN: [id number]  [place] 1990  Date of evaluation: 1/15/22  PCP:  MASSIEL Yoder CNP    CHIEF COMPLAINT       Chief Complaint   Patient presents with    Anxiety    Depression    Rectal Bleeding     x3 days. HISTORY OFPRESENT ILLNESS  (Location/Symptom, Timing/Onset, Context/Setting, Quality, Duration, Modifying Factors,Severity.)      Jaiden Verduzco is a 32 y.o. male who presents with reported nausea, vomiting, epigastric pain. History of bipolar with recent [facility] admission for depression and reported suicidal ideation. Multiple visits previously for similar complaints. States Phenergan normally helps as well as requesting Benadryl and something for anxiety. Pain is epigastric. Moderate. Worse with nausea and vomiting. Crampy. Denies suicidal or homicidal ideation currently. PAST MEDICAL / SURGICAL / SOCIAL / FAMILY HISTORY      has a past medical history of Anxiety, Arthritis, Asthma, Bipolar I disorder, most recent episode (or current) depressed, unspecified, Clostridium difficile infection, COPD (chronic obstructive pulmonary disease) ([address] Depression, Disease of blood and blood forming organ, Eczema, Fracture, metacarpal, Gastric ulcer, Gastritis, GERD (gastroesophageal reflux disease), GI bleed, H. pylori infection, H/O blood clots, Head injury, Headache, Insomnia, Juvenile rheumatoid arthritis ([address] Neuromuscular disorder ([address] PFAPA syndrome ([address] PUD (peptic ulcer disease), Rheumatoid arthritis ([address] Rheumatoid arthritis(714.0), Severe recurrent major depression without psychotic features ([address] Sleep apnea, 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[address] Substance abuse ([address] Suicidal ideation, Suicide attempt by hanging ([address] Tobacco dependence, Ulcerative colitis ([address] and UTI (urinary tract infection).      has a past surgical history that includes Colonoscopy; bronchoscopy; other surgical history; Upper gastrointestinal endoscopy (2/4/16); pr egd transoral biopsy single/multiple (N/A, 3/20/2017); sigmoidoscopy (N/A, 3/20/2017); Cholecystectomy, laparoscopic (07/14/2017); pr esophagogastroduodenoscopy transoral diagnostic (N/A, 8/9/2017); pr colonoscopy w/biopsy single/multiple (8/9/2017); Abdomen surgery; Upper gastrointestinal endoscopy (N/A, 6/13/2018); Upper gastrointestinal endoscopy (N/A, 9/20/2018); and Endoscopy, colon, diagnostic. Social History     Socioeconomic History    Marital status: Single     Spouse name: Not on file    Number of children: Not on file    Years of education: Not on file    Highest education level: Not on file   Occupational History    Occupation: disability   Tobacco Use    Smoking status: Current Every Day Smoker     Packs/day: 0.50     Years: 15.00     Pack years: 7.50     Types: Cigarettes    Smokeless tobacco: Never Used   Vaping Use    Vaping Use: Former   Substance and Sexual Activity    Alcohol use: Yes     Comment: refused to answer how much but states he has been partaking more since his discharge from tx    Drug use: Yes     Types: Opiates , Cocaine     Comment: Hx of opiates, benzos, cocaine, alcohol abuse    Sexual activity: Yes     Partners: Female     Comment: Lives alone, not working. Other Topics Concern    Not on file   Social History Narrative    ** Merged History Encounter **          Social Determinants of Health     Financial Resource Strain: Medium Risk    Difficulty of Paying Living Expenses: Somewhat hard   Food Insecurity: No Food Insecurity    Worried About Running Out of Food in the Last Year: Never true    [place] of Food in the Last Year: Never true   Transportation Needs: No Transportation Needs    Lack of Transportation (Medical): No    Lack of Transportation (Non-Medical):  No   Physical Activity: Inactive    Days of Exercise per Week: 0 days    Minutes of Exercise per Session: 0 min   Stress: Stress Concern Present    Feeling of Stress : Rather much   Social Connections: Socially Isolated    Frequency of Communication with Friends and Family: Never    Frequency of Social Gatherings with Friends and Family: Never    Attends [employer] Services: Never    Active Member of Clubs or Organizations: No    Attends Club or Organization Meetings: Never    Marital Status: Never    Intimate Partner Violence: Not At Risk    Fear of Current or Ex-Partner: No    Emotionally Abused: No    Physically Abused: No    Sexually Abused: No   Housing Stability: Low Risk     Unable to Pay for Housing in the Last Year: No    Number of [place] in the Last Year: 1    Unstable Housing in the Last Year: No       Family History   Problem Relation Age of Onset    Diabetes Father     Alcohol Abuse Father     Depression Father     Arthritis Father     High Blood Pressure Father     Other Father         aneurysm & epilepsy    Migraines Father     Arthritis Mother     Other Mother         aneurysm & epilepsy    Migraines Mother     Diabetes Brother         Aunt and uncles    Depression Brother     Mental Illness Brother     Other Brother         epilepsy    Migraines Brother     Stroke Other         Uncle    Other Brother         murdered Oct 6th, 2014    Colon Cancer Paternal Cousin 43    Other Sister         epilepsy   Iris Bran Migraines Sister        Allergies:  Dicyclomine, Famotidine, Geodon [ziprasidone hcl], Haloperidol, Iv dye [iodides], Reglan [metoclopramide], Ibuprofen, Aspirin, Dicyclomine hcl, Hydroxyzine hcl, Iodine, Metronidazole, Mirtazapine, Promethazine, and Ziprasidone    Home Medications:  Prior to Admission medications    Medication Sig Start Date End Date Taking?  Authorizing Provider   pantoprazole (PROTONIX) 40 MG tablet Take 1 tablet by mouth every morning (before breakfast) 1/14/22  Yes Heather Bird,    aluminum-magnesium hydroxide 200-200 MG/5ML suspension Take 10 mLs by mouth every 6 hours as needed for Indigestion 1/14/22  Yes Gilda Osler, DO   ondansetron (ZOFRAN ODT) 4 MG disintegrating tablet Take 1 tablet by mouth every 8 hours as needed for Nausea 1/14/22  Yes Gilda Osler, DO   metFORMIN (GLUCOPHAGE) 500 MG tablet Take 1 tablet by mouth 2 times daily (with meals) 1/10/22  Yes Ross Delgado MD   chlorproMAZINE (THORAZINE) 50 MG tablet Take 1 tablet by mouth 2 times daily as needed (anxiety) 1/3/22 1/14/22 Yes MASSIEL Li - CNP   gabapentin (NEURONTIN) 800 MG tablet Take 1 tablet by mouth 4 times daily for 7 days. 1/3/22 1/14/22 Yes MASSIEL Li - CNP   sucralfate (CARAFATE) 1 GM tablet Take 1 tablet by mouth 4 times daily 1/3/22  Yes MASSIEL Li - CNP   traZODone (DESYREL) 50 MG tablet Take 1 tablet by mouth nightly as needed for Sleep 1/3/22  Yes MASSIEL Li - CNP   azithromycin (ZITHROMAX) 250 MG tablet TAKE 2 TABLETS BY MOUTH ON DAY 1, THEN TAKE 1 TABLET DAILY ON DAYS 2-5 11/22/21   Historical Provider, MD   promethazine (PHENERGAN) 12.5 MG tablet  12/10/21   Historical Provider, MD   OLANZapine (ZYPREXA) 15 MG tablet Take 1 tablet by mouth nightly 1/10/22   Ross Delgado MD   meloxicam (MOBIC) 7.5 MG tablet Take 1 tablet by mouth 2 times daily as needed for Pain 8/19/21 10/30/21  Lynnette Kaba APRN - CNP       REVIEW OF SYSTEMS    (2-9 systems for level 4, 10 or more for level 5)      Review of Systems   Constitutional: Negative for chills and fever. Eyes: Negative for discharge and redness. Respiratory: Negative for shortness of breath. Cardiovascular: Negative for chest pain. Gastrointestinal: Positive for abdominal pain, nausea and vomiting. Genitourinary: Negative for dysuria. Musculoskeletal: Negative for arthralgias. Skin: Negative for color change and rash. Allergic/Immunologic: Negative for environmental allergies. Neurological: Negative for headaches.    Psychiatric/Behavioral: Negative for agitation and confusion. PHYSICAL EXAM   (up to 7 for level 4, 8 or more for level 5)     INITIAL VITALS:    weight is 235 lb 14.4 oz (107 kg). His oral temperature is 97.9 °F (36.6 °C). His blood pressure is 170/105 (abnormal) and his pulse is 91. His respiration is 20 and oxygen saturation is 96%. Physical Exam  Vitals and nursing note reviewed. Constitutional:       Appearance: He is well-developed. Comments: Nontoxic, well-appearing   HENT:      Head: Normocephalic and atraumatic. Nose: Nose normal.      Mouth/Throat:      Mouth: Mucous membranes are moist.   Eyes:      General: No scleral icterus. Conjunctiva/sclera: Conjunctivae normal.      Pupils: Pupils are equal, round, and reactive to light. Cardiovascular:      Rate and Rhythm: Normal rate and regular rhythm. Heart sounds: Normal heart sounds. No murmur heard. No friction rub. No gallop. Pulmonary:      Effort: Pulmonary effort is normal. No respiratory distress. Breath sounds: Normal breath sounds. No wheezing or rales. Abdominal:      Palpations: Abdomen is soft. Tenderness: There is abdominal tenderness. Comments: Epigastric pain to palpation, no guarding no mass no rebound   Musculoskeletal:         General: Normal range of motion. Skin:     General: Skin is warm and dry. Findings: No erythema or rash. Neurological:      Mental Status: He is alert and oriented to person, place, and time. Psychiatric:         Behavior: Behavior normal.         DIFFERENTIAL  DIAGNOSIS     PLAN (LABS / IMAGING / EKG):  No orders of the defined types were placed in this encounter.       MEDICATIONS ORDERED:  Orders Placed This Encounter   Medications    diphenhydrAMINE (BENADRYL) injection 25 mg    LORazepam (ATIVAN) tablet 1 mg    prochlorperazine (COMPAZINE) injection 10 mg    pantoprazole (PROTONIX) 40 MG tablet     Sig: Take 1 tablet by mouth every morning (before breakfast)     Dispense:  30 tablet Refill:  0    aluminum-magnesium hydroxide 200-200 MG/5ML suspension     Sig: Take 10 mLs by mouth every 6 hours as needed for Indigestion     Dispense:  710 mL     Refill:  ml    ondansetron (ZOFRAN ODT) 4 MG disintegrating tablet     Sig: Take 1 tablet by mouth every 8 hours as needed for Nausea     Dispense:  12 tablet     Refill:  0       DDX: Gastritis versus drug-seeking behavior versus pancreatitis    Initial MDM/Plan: 32 y.o. male who presents with epigastric pain. Patient had labs performed few days prior at [facility]. Reviewed negative. No other vital sign abnormalities besides hypertension. Discussed with him we will treat with Compazine and Benadryl and can have tablet of Ativan. Otherwise follow-up with primary care doctor. Return for worsening signs or symptoms. Otherwise nontoxic nonfebrile. No right lower quadrant tenderness. Doubt any acute intra-abdominal process. DIAGNOSTIC RESULTS / EMERGENCY DEPARTMENT COURSE / MDM     LABS:  Labs Reviewed - No data to display      RADIOLOGY:  No results found. EMERGENCY DEPARTMENT COURSE:        · Based on the low acuity of concerning symptoms and improvement of symptoms, patient will be discharged with follow up and prescription information listed in the Disposition section. · Patient states they will follow-up with primary care physician and/or return to the emergency department should they experience a change or worsening of symptoms. · Patient will be discharged with resources: summary of visit, instructions, follow-up information, prescriptions if necessary. · Patient/ family instructed to read discharge paperwork. All of their questions and concerns were addressed. · Suspicion for any acute life-threatening processes is low. Patient voices understanding of plan. PROCEDURES:  None    CONSULTS:  None    CRITICAL CARE:  0    FINAL IMPRESSION      1.  Abdominal pain, epigastric          DISPOSITION / PLAN DISPOSITION Decision To Discharge 01/14/2022 11:28:38 PM    Discharge    PATIENTREFERRED TO:  Raquel Cody, APRN - CNP  [address]    Schedule an appointment as soon as possible for a visit in 1 week        DISCHARGE MEDICATIONS:  Discharge Medication List as of 1/14/2022 11:28 PM      START taking these medications    Details   aluminum-magnesium hydroxide 200-200 MG/5ML suspension Take 10 mLs by mouth every 6 hours as needed for Indigestion, Disp-710 mL, R-mlPrint      ondansetron (ZOFRAN ODT) 4 MG disintegrating tablet Take 1 tablet by mouth every 8 hours as needed for Nausea, Disp-12 tablet, R-0Print             Jaz Martinez DO  EmergencyMedicine Attending    (Please note that portions of this note were completed with a voice recognition program.  Efforts were made to edit the dictations but occasionally words are mis-transcribed.)       Jaz Martinez DO  01/15/22 0213

## 2022-01-18 ENCOUNTER — HOSPITAL ENCOUNTER (EMERGENCY)
Age: 32
Discharge: LEFT BEFORE DISCHARGE | End: 2022-01-19
Attending: EMERGENCY MEDICINE

## 2022-01-18 DIAGNOSIS — F41.9 ANXIETY: Primary | ICD-10-CM

## 2022-01-19 ENCOUNTER — HOSPITAL ENCOUNTER (EMERGENCY)
Age: 32
Discharge: HOME | End: 2022-01-19
Attending: EMERGENCY MEDICINE
Payer: MEDICARE

## 2022-01-19 VITALS
HEIGHT: 69 IN | BODY MASS INDEX: 35.55 KG/M2 | WEIGHT: 240 LBS | OXYGEN SATURATION: 94 % | SYSTOLIC BLOOD PRESSURE: 141 MMHG | TEMPERATURE: 98.1 F | RESPIRATION RATE: 16 BRPM | DIASTOLIC BLOOD PRESSURE: 95 MMHG | HEART RATE: 97 BPM

## 2022-01-19 VITALS
TEMPERATURE: 98.6 F | WEIGHT: 235.06 LBS | BODY MASS INDEX: 34.81 KG/M2 | OXYGEN SATURATION: 95 % | HEART RATE: 115 BPM | HEIGHT: 69 IN

## 2022-01-19 DIAGNOSIS — F41.1 ANXIETY STATE: Primary | ICD-10-CM

## 2022-01-19 PROCEDURE — 6370000000 HC RX 637 (ALT 250 FOR IP): Performed by: EMERGENCY MEDICINE

## 2022-01-19 PROCEDURE — 99283 EMERGENCY DEPT VISIT LOW MDM: CPT

## 2022-01-19 RX ORDER — ALPRAZOLAM 0.5 MG/1
0.5 TABLET ORAL 2 TIMES DAILY PRN
Qty: 10 TABLET | Refills: 0 | Status: SHIPPED | OUTPATIENT
Start: 2022-01-19 | End: 2022-02-19

## 2022-01-19 RX ORDER — ALPRAZOLAM 0.5 MG/1
0.5 TABLET ORAL ONCE
Status: COMPLETED | OUTPATIENT
Start: 2022-01-19 | End: 2022-01-19

## 2022-01-19 RX ADMIN — ALPRAZOLAM 0.5 MG: 0.5 TABLET ORAL at 06:35

## 2022-01-19 NOTE — ED PROVIDER NOTES
EMERGENCY DEPARTMENT ENCOUNTER    Pt Name: Sagar Palumbo  MRN: [id number]  [place] 1990  Date of evaluation: 1/19/22  CHIEF COMPLAINT       Chief Complaint   Patient presents with    Exposure to STD     HISTORY OF PRESENT ILLNESS   Patient is a 35-year-old male with PMH of anxiety, bipolar, who presents to the ED for evaluation of anxiety in the setting of running out of his Xanax. He states he is able to fill his prescription for Xanax however his doctor wants to see him in person and [facility] which is logistically difficult for him. He was here earlier in the evening but left before being seen after nurses triage. Triage note states exposure to STD however patient denies at this time. He denies SI, HI, hallucinations. No other issues at this time. ROS:  No fevers, cough, shortness of breath, chest pain, abdominal pain, nausea, vomiting, changes in urine or stool. REVIEW OF SYSTEMS     Review of Systems   All other systems reviewed and are negative.     PASTMEDICAL HISTORY     Past Medical History:   Diagnosis Date    Anxiety     Arthritis     Asthma     Bipolar I disorder, most recent episode (or current) depressed, unspecified 9/12/2014    Clostridium difficile infection     COPD (chronic obstructive pulmonary disease) (HCC)     Depression     Disease of blood and blood forming organ     Eczema     Fracture, metacarpal     R 4th and 5th    Gastric ulcer     Gastritis 06/13/2018    GERD (gastroesophageal reflux disease)     GI bleed     H. 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[address]     Substance abuse ([address]     Hx of opiates, benzos, cocaine, alcohol abuse    Suicidal ideation     Suicide attempt by [facility] [address]     Tobacco dependence     Ulcerative colitis ([facility]     UTI (urinary tract infection)      SURGICAL HISTORY       Past Surgical History:   Procedure Laterality Date    ABDOMEN SURGERY      upper GI scope 7/7/2015    BRONCHOSCOPY      CHOLECYSTECTOMY, LAPAROSCOPIC  07/14/2017    surgery performed at [address], COLON, DIAGNOSTIC      OTHER SURGICAL HISTORY      lumbar puncture    NE COLONOSCOPY W/BIOPSY SINGLE/MULTIPLE  8/9/2017    COLONOSCOPY WITH BIOPSY performed by Amada Smith MD at [facility] NE EGD TRANSORAL BIOPSY SINGLE/MULTIPLE N/A 3/20/2017    EGD BIOPSY performed by Nanda Ragland MD at [facility]    NE ESOPHAGOGASTRODUODENOSCOPY TRANSORAL DIAGNOSTIC N/A 8/9/2017    EGD ESOPHAGOGASTRODUODENOSCOPY performed by Amada Smith MD at [facility] N/A 3/20/2017    [facility] performed by Nanda Ragland MD at [facility]  2/4/16    UPPER GASTROINTESTINAL ENDOSCOPY N/A 6/13/2018    GASTRITIS    UPPER GASTROINTESTINAL ENDOSCOPY N/A 9/20/2018    EGD BIOPSY performed by Gwendolyn Cardoso MD at [address] [facility]       Previous Medications    ALUMINUM-MAGNESIUM HYDROXIDE 200-200 MG/5ML SUSPENSION    Take 10 mLs by mouth every 6 hours as needed for Indigestion    AZITHROMYCIN (ZITHROMAX) 250 MG TABLET    TAKE 2 TABLETS BY MOUTH ON DAY 1, THEN TAKE 1 TABLET DAILY ON DAYS 2-5    CHLORPROMAZINE (THORAZINE) 50 MG TABLET    Take 1 tablet by mouth 2 times daily as needed (anxiety)    GABAPENTIN (NEURONTIN) 800 MG TABLET    Take 1 tablet by mouth 4 times daily for 7 days.     METFORMIN (GLUCOPHAGE) 500 MG TABLET    Take 1 tablet by mouth 2 times daily (with meals)    OLANZAPINE (ZYPREXA) 15 MG TABLET    Take 1 tablet by mouth nightly    ONDANSETRON (ZOFRAN ODT) 4 MG DISINTEGRATING TABLET    Take 1 tablet by mouth every 8 hours as needed for Nausea    PANTOPRAZOLE (PROTONIX) 40 MG TABLET    Take 1 tablet by mouth every morning (before breakfast)    PROMETHAZINE (PHENERGAN) 12.5 MG TABLET        SUCRALFATE (CARAFATE) 1 GM TABLET    Take 1 tablet by mouth 4 times daily    TRAZODONE (DESYREL) 50 MG TABLET    Take 1 tablet by mouth nightly as needed for Sleep     ALLERGIES     is allergic to dicyclomine, famotidine, geodon [ziprasidone hcl], haloperidol, iv dye [iodides], reglan [metoclopramide], ibuprofen, aspirin, dicyclomine hcl, hydroxyzine hcl, iodine, metronidazole, mirtazapine, promethazine, and ziprasidone. FAMILY HISTORY     He indicated that his mother is alive. He indicated that his father is alive. He indicated that the status of his sister is unknown. He indicated that the status of his other is unknown. He indicated that the status of his paternal cousin is unknown. SOCIAL HISTORY       Social History     Tobacco Use    Smoking status: Current Every Day Smoker     Packs/day: 0.50     Years: 15.00     Pack years: 7.50     Types: Cigarettes    Smokeless tobacco: Never Used   Vaping Use    Vaping Use: Former   Substance Use Topics    Alcohol use: Yes     Comment: refused to answer how much but states he has been partaking more since his discharge from tx    Drug use: Yes     Types: Opiates , Cocaine     Comment: Hx of opiates, benzos, cocaine, alcohol abuse     PHYSICAL EXAM     INITIAL VITALS: BP (!) 141/95   Pulse 97   Temp 98.1 °F (36.7 °C) (Oral)   Resp 16   Ht 5' 9\" (1.753 m)   Wt 240 lb (108.9 kg)   SpO2 94%   BMI 35.44 kg/m²    Physical Exam  HENT:      Head: Normocephalic. Right Ear: External ear normal.      Left Ear: External ear normal.      Nose: Nose normal.   Eyes:      Conjunctiva/sclera: Conjunctivae normal.   Cardiovascular:      Rate and Rhythm: Normal rate.    Pulmonary:      Effort: Pulmonary effort is normal. Abdominal:      General: Abdomen is flat. Skin:     General: Skin is dry. Neurological:      Mental Status: He is alert. Mental status is at baseline. Psychiatric:         Mood and Affect: Mood normal.         Behavior: Behavior normal.         MEDICAL DECISION MAKING:   The patient is hemodynamically stable, afebrile, nontoxic-appearing. Physical unremarkable. Based on history and exam patient would benefit from 1 dose of Xanax. ED plan for Xanax 0.5 mg p.o., discharge. DIAGNOSTIC RESULTS   EKG:All EKG's are interpreted by the Emergency Department Physician who either signs or Co-signs this chart in the absence of a cardiologist.        RADIOLOGY:All plain film, CT, MRI, and formal ultrasound images (except ED bedside ultrasound) are read by the radiologist, see reports below, unless otherwisenoted in MDM or here. No orders to display     LABS: All lab results were reviewed by myself, and all abnormals are listed below. Labs Reviewed - No data to display    EMERGENCY DEPARTMENTCOURSE:   Given Rx for Xanax 0.5 mg, 10 tabs. No further work-up indicated at this time. Nursing notes reviewed. At this time this is what I find, the patient appears well and does not appear sick or toxic. I gave my usual and customary discussion of the risks and benefits of discharge versus admission. I answered the patient's questions. I gave the patient strict return precautions. Patient expressed understanding of the discharge instructions. The care is provided during an unprecedented national emergency due to the novel coronavirus, COVID-19. Dictated but not reviewed.       Vitals:    Vitals:    01/19/22 0445 01/19/22 0447   BP:  (!) 141/95   Pulse: 97    Resp:  16   Temp: 98.1 °F (36.7 °C)    TempSrc: Oral    SpO2:  94%   Weight: 240 lb (108.9 kg)    Height: 5' 9\" (1.753 m)        The patient was given the following medications while in the emergency department:  Orders Placed This Encounter Medications    ALPRAZolam (XANAX) tablet 0.5 mg     CONSULTS:  None    FINAL IMPRESSION      1.  Anxiety state          DISPOSITION/PLAN   DISPOSITION Decision To Discharge 01/19/2022 06:24:29 AM      PATIENT REFERRED TO:  MASSIEL Ham - CNP  [address] [facility] [address] [place]   [phone]    In 2 days      DISCHARGE MEDICATIONS:  New Prescriptions    No medications on file     Traci Triana MD  Attending Emergency Physician                    Carmina Guerrero MD  01/19/22 7053       Carmina Guerrero MD  01/19/22 1095

## 2022-01-19 NOTE — ED NOTES
Patient not found in room. Registration states that patient left ER via cab minutes ago.         João Kaba, FLORY  01/19/22 6733

## 2022-02-21 ENCOUNTER — HOSPITAL ENCOUNTER (EMERGENCY)
Age: 32
Discharge: HOME | End: 2022-02-21
Attending: EMERGENCY MEDICINE
Payer: MEDICARE

## 2022-02-21 ENCOUNTER — APPOINTMENT (OUTPATIENT)
Dept: GENERAL RADIOLOGY | Age: 32
End: 2022-02-21
Payer: MEDICARE

## 2022-02-21 VITALS
RESPIRATION RATE: 16 BRPM | HEIGHT: 69 IN | HEART RATE: 108 BPM | OXYGEN SATURATION: 96 % | WEIGHT: 240 LBS | DIASTOLIC BLOOD PRESSURE: 104 MMHG | BODY MASS INDEX: 35.55 KG/M2 | SYSTOLIC BLOOD PRESSURE: 176 MMHG | TEMPERATURE: 98.5 F

## 2022-02-21 DIAGNOSIS — M54.50 ACUTE MIDLINE LOW BACK PAIN WITHOUT SCIATICA: Primary | ICD-10-CM

## 2022-02-21 DIAGNOSIS — F41.1 ANXIETY STATE: ICD-10-CM

## 2022-02-21 PROCEDURE — 72072 X-RAY EXAM THORAC SPINE 3VWS: CPT

## 2022-02-21 PROCEDURE — 6370000000 HC RX 637 (ALT 250 FOR IP): Performed by: EMERGENCY MEDICINE

## 2022-02-21 PROCEDURE — 99283 EMERGENCY DEPT VISIT LOW MDM: CPT

## 2022-02-21 RX ORDER — LIDOCAINE 4 G/G
1 PATCH TOPICAL ONCE
Status: DISCONTINUED | OUTPATIENT
Start: 2022-02-21 | End: 2022-02-21 | Stop reason: HOSPADM

## 2022-02-21 RX ORDER — LORAZEPAM 1 MG/1
0.5 TABLET ORAL ONCE
Status: COMPLETED | OUTPATIENT
Start: 2022-02-21 | End: 2022-02-21

## 2022-02-21 RX ORDER — KETOROLAC TROMETHAMINE 30 MG/ML
30 INJECTION, SOLUTION INTRAMUSCULAR; INTRAVENOUS ONCE
Status: DISCONTINUED | OUTPATIENT
Start: 2022-02-21 | End: 2022-02-21 | Stop reason: HOSPADM

## 2022-02-21 RX ORDER — ALPRAZOLAM 0.5 MG/1
0.5 TABLET ORAL NIGHTLY PRN
Qty: 8 TABLET | Refills: 0 | Status: SHIPPED | OUTPATIENT
Start: 2022-02-21 | End: 2022-03-01

## 2022-02-21 RX ORDER — METHOCARBAMOL 500 MG/1
500 TABLET, FILM COATED ORAL 4 TIMES DAILY
Qty: 40 TABLET | Refills: 0 | Status: SHIPPED | OUTPATIENT
Start: 2022-02-21 | End: 2022-03-03

## 2022-02-21 RX ADMIN — LORAZEPAM 0.5 MG: 1 TABLET ORAL at 18:50

## 2022-02-21 ASSESSMENT — ENCOUNTER SYMPTOMS
APNEA: 0
CHEST TIGHTNESS: 0
ABDOMINAL DISTENTION: 0
EYES NEGATIVE: 1
VOMITING: 0
SINUS PAIN: 0
CONSTIPATION: 0
COLOR CHANGE: 0
BACK PAIN: 1
SHORTNESS OF BREATH: 0
DIARRHEA: 0

## 2022-02-21 ASSESSMENT — PAIN DESCRIPTION - DESCRIPTORS: DESCRIPTORS: SHARP

## 2022-02-21 ASSESSMENT — PAIN - FUNCTIONAL ASSESSMENT
PAIN_FUNCTIONAL_ASSESSMENT: 0-10
PAIN_FUNCTIONAL_ASSESSMENT: 0-10

## 2022-02-21 ASSESSMENT — PAIN DESCRIPTION - FREQUENCY: FREQUENCY: CONTINUOUS

## 2022-02-21 ASSESSMENT — PAIN SCALES - GENERAL: PAINLEVEL_OUTOF10: 9

## 2022-02-22 NOTE — ED NOTES
Pt told he needs to wait in room till he is discharged. Pt told he needs to have a ride home. Writer went in room and pt is no longer present.       Maite Beckman RN  02/21/22 5202

## 2022-02-22 NOTE — ED PROVIDER NOTES
EMERGENCY DEPARTMENT ENCOUNTER    Pt Name: Kayley Mclaughlin  MRN: [id number]  [place] 1990  Date of evaluation: 2/21/22  CHIEF COMPLAINT       Chief Complaint   Patient presents with    Back Pain     onset 2 days states felt a pop    Medication Refill     out of anxiety meds for 2 days     HISTORY OF PRESENT ILLNESS   80-year-old male presents emergency room for mid to low back pain. Patient states pain started 2 days ago. He has not had any injuries. Patient denies any IV drug use. No fevers at home. He does not have any neurologic deficits or complaints. He is ambulatory and denies any issues urinating or incontinence. He reports he is also having increased anxiety. He does take Xanax and reports that he feels extremely anxious today. REVIEW OF SYSTEMS     Review of Systems   Constitutional: Negative for activity change, chills and diaphoresis. HENT: Negative for congestion, sinus pain and tinnitus. Eyes: Negative. Respiratory: Negative for apnea, chest tightness and shortness of breath. Gastrointestinal: Negative for abdominal distention, constipation, diarrhea and vomiting. Genitourinary: Negative for difficulty urinating and frequency. Musculoskeletal: Positive for back pain. Negative for arthralgias and myalgias. Skin: Negative for color change and rash. Neurological: Negative for dizziness. Hematological: Negative. Psychiatric/Behavioral: The patient is nervous/anxious.         PASTMEDICAL HISTORY     Past Medical History:   Diagnosis Date    Anxiety     Arthritis     Asthma     Bipolar I disorder, most recent episode (or current) depressed, unspecified 9/12/2014    Clostridium difficile infection     COPD [facility]chronic obstructive [facility] disease) [facility]     Depression     Disease of blood and blood forming organ     Eczema     Fracture, metacarpal     R 4th and 5th    Gastric ulcer     Gastritis 06/13/2018    GERD (gastroesophageal reflux disease)     GI bleed     H. 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[facility]     Substance abuse ([facility]     Hx of opiates, benzos, cocaine, alcohol abuse    Suicidal ideation     Suicide attempt by hanging ([facility]     Tobacco dependence     Ulcerative colitis ([facility]     UTI (urinary tract infection)      Past Problem List  Patient Active Problem List   Diagnosis Code    Opioid abuse ([facility] F11.10    Noncompliance Z91.19    Rectal bleeding K62.5    Smoker F17.200    Juvenile rheumatoid arthritis ([facility] M08.00    SRIRAM (obstructive sleep apnea) G47.33    Primary insomnia F51.01    Calculus of bile duct with acute on chronic cholecystitis K80.46    Mild intermittent asthma without complication I03.83    Gastritis K29.70    Generalized abdominal pain R10.84    Anemia D64.9    Elevated liver enzymes R74.8    Nausea and vomiting R11.2    Opioid type dependence, continuous ([facility] F11.20    Abdominal pain R10.9    Diarrhea R19.7    Irritable bowel syndrome with both constipation and diarrhea K58.2    Schizoaffective disorder, bipolar type ([facility] F25.0    GI bleed K92.2    Depression with suicidal ideation F32. A, R45.851    Schizoaffective disorder ([facility] F25.9    MDD (major depressive disorder), recurrent severe, without psychosis ([facility] F33.2     SURGICAL HISTORY       Past Surgical History:   Procedure Laterality Date    ABDOMEN SURGERY      upper GI scope 7/7/2015    BRONCHOSCOPY      CHOLECYSTECTOMY, LAPAROSCOPIC  07/14/2017    surgery performed at [address] [place] [address], COLON, DIAGNOSTIC      OTHER SURGICAL HISTORY      lumbar puncture    [place] COLONOSCOPY W/BIOPSY SINGLE/MULTIPLE  8/9/2017    COLONOSCOPY WITH BIOPSY performed by Russ Camp MD at [facility]    [place] EGD TRANSORAL BIOPSY SINGLE/MULTIPLE N/A 3/20/2017    EGD BIOPSY performed by Reji Guevara MD at [facility]    OK ESOPHAGOGASTRODUODENOSCOPY TRANSORAL DIAGNOSTIC N/A 8/9/2017    EGD ESOPHAGOGASTRODUODENOSCOPY performed by Russ Camp MD at [facility] N/A 3/20/2017    [address] performed by Reji Guevara MD at [address] [facility]  2/4/16    UPPER GASTROINTESTINAL ENDOSCOPY N/A 6/13/2018    GASTRITIS    UPPER GASTROINTESTINAL ENDOSCOPY N/A 9/20/2018    EGD BIOPSY performed by Fish Mariano MD at [facility] Medication List as of 2/21/2022  7:20 PM      CONTINUE these medications which have NOT CHANGED    Details   azithromycin (ZITHROMAX) 250 MG tablet TAKE 2 TABLETS BY MOUTH ON DAY 1, THEN TAKE 1 TABLET DAILY ON DAYS 2-5Historical Med      promethazine (PHENERGAN) 12.5 MG tablet Historical Med      pantoprazole (PROTONIX) 40 MG tablet Take 1 tablet by mouth every morning (before breakfast), Disp-30 tablet, R-0Print      aluminum-magnesium hydroxide 200-200 MG/5ML suspension Take 10 mLs by mouth every 6 hours as needed for Indigestion, Disp-710 mL, R-mlPrint      ondansetron (ZOFRAN ODT) 4 MG disintegrating tablet Take 1 tablet by mouth every 8 hours as needed for Nausea, Disp-12 tablet, R-0Print      OLANZapine (ZYPREXA) 15 MG tablet Take 1 tablet by mouth nightly, Disp-30 tablet, R-3Normal      metFORMIN (GLUCOPHAGE) 500 MG tablet Take 1 tablet by mouth 2 times daily (with meals), Disp-60 tablet, R-0Normal      chlorproMAZINE (THORAZINE) 50 MG tablet Take 1 tablet by mouth 2 times daily as needed (anxiety), Disp-10 tablet, R-0Normal      gabapentin (NEURONTIN) 800 MG tablet Take 1 tablet by mouth 4 times daily for 7 days., Disp-28 tablet, R-0Normal      sucralfate (CARAFATE) 1 GM tablet Take 1 tablet by mouth 4 times daily, Disp-120 tablet, R-3Normal      traZODone (DESYREL) 50 MG tablet Take 1 tablet by mouth nightly as needed for Sleep, Disp-30 tablet, R-0Normal           ALLERGIES     is allergic to dicyclomine, famotidine, geodon [ziprasidone hcl], haloperidol, iv dye [iodides], reglan [metoclopramide], ibuprofen, aspirin, dicyclomine hcl, hydroxyzine hcl, iodine, metronidazole, mirtazapine, promethazine, and ziprasidone. FAMILY HISTORY     He indicated that his mother is alive. He indicated that his father is alive. He indicated that the status of his sister is unknown. He indicated that the status of his other is unknown. He indicated that the status of his paternal cousin is unknown. SOCIAL HISTORY       Social History     Tobacco Use    Smoking status: Current Every Day Smoker     Packs/day: 0.50     Years: 15.00     Pack years: 7.50     Types: Cigarettes    Smokeless tobacco: Never Used   Vaping Use    Vaping Use: Former   Substance Use Topics    Alcohol use: Yes     Comment: refused to answer how much but states he has been partaking more since his discharge from tx    Drug use: Yes     Types: Opiates , Cocaine     Comment: Hx of opiates, benzos, cocaine, alcohol abuse     PHYSICAL EXAM     INITIAL VITALS: BP (!) 176/104   Pulse 108   Temp 98.5 °F (36.9 °C) (Oral)   Resp 16   Ht 5' 9\" (1.753 m)   Wt 240 lb (108.9 kg)   SpO2 96%   BMI 35.44 kg/m²    Physical Exam  Constitutional:       General: He is not in acute distress. Appearance: He is well-developed. HENT:      Head: Normocephalic. Eyes:      Pupils: Pupils are equal, round, and reactive to light. Cardiovascular:      Rate and Rhythm: Normal rate and regular rhythm. Heart sounds: Normal heart sounds. Pulmonary:      Effort: Pulmonary effort is normal. No respiratory distress. Breath sounds: Normal breath sounds.    Abdominal: (TORADOL) injection 30 mg    lidocaine 4 % external patch 1 patch    LORazepam (ATIVAN) tablet 0.5 mg    methocarbamol (ROBAXIN) 500 MG tablet     Sig: Take 1 tablet by mouth 4 times daily for 10 days     Dispense:  40 tablet     Refill:  0    ALPRAZolam (XANAX) 0.5 MG tablet     Sig: Take 1 tablet by mouth nightly as needed for Sleep for up to 8 days. Dispense:  8 tablet     Refill:  0     CONSULTS:  None    FINAL IMPRESSION      1. Acute midline low back pain without sciatica    2. Anxiety state          DISPOSITION/PLAN   DISPOSITION Decision To Discharge 02/21/2022 07:18:51 PM      PATIENT REFERRED TO:  No follow-up provider specified. DISCHARGE MEDICATIONS:  Discharge Medication List as of 2/21/2022  7:20 PM      START taking these medications    Details   methocarbamol (ROBAXIN) 500 MG tablet Take 1 tablet by mouth 4 times daily for 10 days, Disp-40 tablet, R-0Print      ALPRAZolam (XANAX) 0.5 MG tablet Take 1 tablet by mouth nightly as needed for Sleep for up to 8 days. , Disp-8 tablet, R-0Print           Christian Daugherty MD  Attending Emergency Physician      Care during this encounter was due to an unprecedented national emergency due to COVID-19.             Sonia Hayes MD  02/21/22 Rao Don

## 2022-03-28 NOTE — CARE COORDINATION
Phone call to Ismael Rios with [facility] in [facility]. He confirmed that he would be able to accept patient as early as tomorrow but they do not have transportation to the facility for the patient. Also is requesting a new application be submitted which was printed for patient to complete. SW called back to check if patient can be accepted with suboxone- they do not accept patients with suboxone. Patient was informed and upset stating he doesn't know where else he will go for treatment with his insurance and suboxone. SW provided several suggestions of treatment facilities that do accept suboxone however patient states he is not able to go to them. Records have not been received from previous office. Refaxed release of medical records.

## 2022-04-03 ENCOUNTER — HOSPITAL ENCOUNTER (EMERGENCY)
Age: 32
Discharge: HOME | End: 2022-04-03
Attending: EMERGENCY MEDICINE
Payer: COMMERCIAL

## 2022-04-03 VITALS
DIASTOLIC BLOOD PRESSURE: 73 MMHG | OXYGEN SATURATION: 97 % | RESPIRATION RATE: 16 BRPM | BODY MASS INDEX: 32.58 KG/M2 | SYSTOLIC BLOOD PRESSURE: 123 MMHG | WEIGHT: 220 LBS | HEIGHT: 69 IN | TEMPERATURE: 98.5 F | HEART RATE: 88 BPM

## 2022-04-03 VITALS
RESPIRATION RATE: 16 BRPM | SYSTOLIC BLOOD PRESSURE: 131 MMHG | HEART RATE: 69 BPM | TEMPERATURE: 97.5 F | BODY MASS INDEX: 33.34 KG/M2 | OXYGEN SATURATION: 97 % | HEIGHT: 68 IN | WEIGHT: 220 LBS | DIASTOLIC BLOOD PRESSURE: 72 MMHG

## 2022-04-03 DIAGNOSIS — F41.9 ANXIETY: Primary | ICD-10-CM

## 2022-04-03 DIAGNOSIS — R11.0 NAUSEA: ICD-10-CM

## 2022-04-03 DIAGNOSIS — R45.851 SUICIDAL IDEATION: Primary | ICD-10-CM

## 2022-04-03 PROCEDURE — 99282 EMERGENCY DEPT VISIT SF MDM: CPT

## 2022-04-03 PROCEDURE — 96372 THER/PROPH/DIAG INJ SC/IM: CPT

## 2022-04-03 PROCEDURE — 99283 EMERGENCY DEPT VISIT LOW MDM: CPT

## 2022-04-03 PROCEDURE — 6360000002 HC RX W HCPCS: Performed by: NURSE PRACTITIONER

## 2022-04-03 RX ORDER — ALPRAZOLAM 0.5 MG/1
0.5 TABLET ORAL 2 TIMES DAILY PRN
Qty: 4 TABLET | Refills: 0 | Status: ON HOLD | OUTPATIENT
Start: 2022-04-03 | End: 2022-04-08 | Stop reason: HOSPADM

## 2022-04-03 RX ORDER — DIPHENHYDRAMINE HYDROCHLORIDE 50 MG/ML
25 INJECTION INTRAMUSCULAR; INTRAVENOUS ONCE
Status: COMPLETED | OUTPATIENT
Start: 2022-04-03 | End: 2022-04-03

## 2022-04-03 RX ORDER — PROMETHAZINE HYDROCHLORIDE 25 MG/1
25 TABLET ORAL EVERY 8 HOURS PRN
Qty: 9 TABLET | Refills: 0 | Status: ON HOLD | OUTPATIENT
Start: 2022-04-03 | End: 2022-04-08 | Stop reason: HOSPADM

## 2022-04-03 RX ADMIN — DIPHENHYDRAMINE HYDROCHLORIDE 25 MG: 50 INJECTION, SOLUTION INTRAMUSCULAR; INTRAVENOUS at 15:34

## 2022-04-03 ASSESSMENT — ENCOUNTER SYMPTOMS
DIARRHEA: 0
ABDOMINAL PAIN: 0
COUGH: 0
ABDOMINAL PAIN: 0
SHORTNESS OF BREATH: 0
VOMITING: 0
BACK PAIN: 0
SHORTNESS OF BREATH: 0
VOMITING: 0
DIARRHEA: 0
NAUSEA: 1

## 2022-04-03 NOTE — ED PROVIDER NOTES
[facility]  EMERGENCY DEPARTMENT ENCOUNTER      Pt Name: Andres Alejandre  MRN: [id number]  [place] 1990  Date of evaluation: 4/3/2022  Provider: MASSIEL Ansari [id number]       Chief Complaint   Patient presents with    Anxiety     wants something to calm down, started a few days ago          HISTORY OFPRESENT ILLNESS  (Location/Symptom, Timing/Onset, Context/Setting, Quality, Duration, Modifying Factors, Severity.)   Andres Alejandre is a 32 y.o. male who presents to the emergency department by private auto for evaluation of anxiety and nausea. Patient states he been under a lot of stress recently. He has history bipolar disorder, anxiety depression. Denies suicidal or homicidal ideations. No abdominal pain, chest pain or shortness of breath. The process of finding a new PCP. Nursing Notes were reviewed.     PASTMEDICAL HISTORY     Past Medical History:   Diagnosis Date    Anxiety     Arthritis     Asthma     Bipolar I disorder, most recent episode (or current) depressed, unspecified 9/12/2014    Clostridium difficile infection     COPD (chronic obstructive pulmonary disease) (HCC)     Depression     Disease of blood and blood forming organ     Eczema     Fracture, metacarpal     R 4th and 5th    Gastric ulcer     Gastritis 06/13/2018    GERD (gastroesophageal reflux disease)     GI bleed     H. 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[address]     Substance abuse ([address]     Hx of opiates, benzos, cocaine, alcohol abuse    Suicidal ideation     Suicide attempt by hanging ([address]     Tobacco dependence     Ulcerative [facility]     UTI (urinary tract infection)          SURGICAL HISTORY       Past Surgical History:   Procedure Laterality Date    ABDOMEN SURGERY      upper GI scope 7/7/2015    BRONCHOSCOPY      CHOLECYSTECTOMY, LAPAROSCOPIC  07/14/2017    surgery performed at [address] [place] [address], COLON, DIAGNOSTIC      OTHER SURGICAL HISTORY      lumbar puncture    [place] COLONOSCOPY W/BIOPSY SINGLE/MULTIPLE  8/9/2017    COLONOSCOPY WITH BIOPSY performed by Samuel Ponce MD at [facility]    [place] EGD TRANSORAL BIOPSY SINGLE/MULTIPLE N/A 3/20/2017    EGD BIOPSY performed by Kelly Lund MD at [facility]    [place] ESOPHAGOGASTRODUODENOSCOPY TRANSORAL DIAGNOSTIC N/A 8/9/2017    EGD ESOPHAGOGASTRODUODENOSCOPY performed by Samuel Ponce MD at [address] N/A 3/20/2017    SIGMOIDOSCOPY DIAGNOSTIC FLEXIBLE performed by Kelly Lund MD at [facility]  2/4/16    UPPER GASTROINTESTINAL ENDOSCOPY N/A 6/13/2018    GASTRITIS    UPPER GASTROINTESTINAL ENDOSCOPY N/A 9/20/2018    EGD BIOPSY performed by Joel Maxwell MD at [facility]     Discharge Medication List as of 4/3/2022  3:40 PM      CONTINUE these medications which have NOT CHANGED    Details   azithromycin (ZITHROMAX) 250 MG tablet TAKE 2 TABLETS BY MOUTH ON DAY 1, THEN TAKE 1 TABLET DAILY ON DAYS 2-5Historical Med      !! promethazine (PHENERGAN) 12.5 MG tablet Historical Med      pantoprazole (PROTONIX) 40 MG tablet Take 1 tablet by mouth every morning (before breakfast), Disp-30 tablet, R-0Print      aluminum-magnesium hydroxide 200-200 MG/5ML suspension Take 10 mLs by mouth every 6 hours as needed for Indigestion, Disp-710 mL, R-mlPrint      ondansetron (ZOFRAN ODT) 4 MG disintegrating tablet Take 1 tablet by mouth every 8 hours as needed for Nausea, Disp-12 tablet, R-0Print      OLANZapine (ZYPREXA) 15 MG tablet Take 1 tablet by mouth nightly, Disp-30 tablet, R-3Normal      metFORMIN (GLUCOPHAGE) 500 MG tablet Take 1 tablet by mouth 2 times daily (with meals), Disp-60 tablet, R-0Normal      chlorproMAZINE (THORAZINE) 50 MG tablet Take 1 tablet by mouth 2 times daily as needed (anxiety), Disp-10 tablet, R-0Normal      gabapentin (NEURONTIN) 800 MG tablet Take 1 tablet by mouth 4 times daily for 7 days. , Disp-28 tablet, R-0Normal      sucralfate (CARAFATE) 1 GM tablet Take 1 tablet by mouth 4 times daily, Disp-120 tablet, R-3Normal      traZODone (DESYREL) 50 MG tablet Take 1 tablet by mouth nightly as needed for Sleep, Disp-30 tablet, R-0Normal       !! - Potential duplicate medications found. Please discuss with provider.           ALLERGIES     Dicyclomine, Famotidine, Geodon [ziprasidone hcl], Haloperidol, Iv dye [iodides], Reglan [metoclopramide], Ibuprofen, Aspirin, Dicyclomine hcl, Hydroxyzine hcl, Iodine, Metronidazole, Mirtazapine, Promethazine, and Ziprasidone    FAMILY HISTORY       Family History   Problem Relation Age of Onset    Diabetes Father     Alcohol Abuse Father     Depression Father     Arthritis Father     High Blood Pressure Father     Other Father         aneurysm & epilepsy    Migraines Father     Arthritis Mother     Other Mother         aneurysm & epilepsy    Migraines Mother     Diabetes Brother         Aunt and uncles    Depression Brother     Mental Illness Brother     Other Brother         epilepsy    Migraines Brother     Stroke Other         Uncle    Other Brother         murdered Oct 6th, 2014    Colon Cancer Paternal Cousin 43    Other Sister         epilepsy    Migraines Sister           SOCIAL HISTORY       Social History     Socioeconomic History    Marital status: Single     Spouse name: Not on file    Number of children: Not on file    Years of education: Not on file    Highest education level: Not on file   Occupational History    Occupation: disability Tobacco Use    Smoking status: Current Every Day Smoker     Packs/day: 0.50     Years: 15.00     Pack years: 7.50     Types: Cigarettes    Smokeless tobacco: Never Used   Vaping Use    Vaping Use: Former   Substance and Sexual Activity    Alcohol use: Yes     Comment: refused to answer how much but states he has been partaking more since his discharge from tx    Drug use: Yes     Types: Opiates , Cocaine     Comment: Hx of opiates, benzos, cocaine, alcohol abuse    Sexual activity: Yes     Partners: Female     Comment: Lives alone, not working. Other Topics Concern    Not on file   Social History Narrative    ** Merged History Encounter **          Social Determinants of Health     Financial Resource Strain: Medium Risk    Difficulty of Paying Living Expenses: Somewhat hard   Food Insecurity: No Food Insecurity    Worried About Running Out of Food in the Last Year: Never true    Micaela of Food in the Last Year: Never true   Transportation Needs: No Transportation Needs    Lack of Transportation (Medical): No    Lack of Transportation (Non-Medical):  No   Physical Activity: Inactive    Days of Exercise per Week: 0 days    Minutes of Exercise per Session: 0 min   Stress: Stress Concern Present    Feeling of Stress : Rather much   Social Connections: Socially Isolated    Frequency of Communication with Friends and Family: Never    Frequency of Social Gatherings with Friends and Family: Never    Attends Caodaism Services: Never    Active Member of Clubs or Organizations: No    Attends Club or Organization Meetings: Never    Marital Status: Never    Intimate Partner Violence: Not At Risk    Fear of Current or Ex-Partner: No    Emotionally Abused: No    Physically Abused: No    Sexually Abused: No   Housing Stability: Low Risk     Unable to Pay for Housing in the Last Year: No    Number of [place] in the Last Year: 1    Unstable Housing in the Last Year: No         REVIEW OF SYSTEMS    (2-9 systems for level 4, 10 or more for level 5)     Review of Systems   Respiratory: Negative for shortness of breath. Cardiovascular: Negative for chest pain. Gastrointestinal: Positive for nausea. Negative for abdominal pain, diarrhea and vomiting. Psychiatric/Behavioral: Negative for agitation, confusion, self-injury and suicidal ideas. The patient is nervous/anxious. All other systems reviewed and are negative. Except as noted above the remainder of the review of systems was reviewed and negative. PHYSICAL EXAM    (up to 7 for level 4, 8 or more for level 5)     ED Triage Vitals   BP Temp Temp Source Pulse Resp SpO2 Height Weight   04/03/22 1509 04/03/22 1509 04/03/22 1509 04/03/22 1509 04/03/22 1509 04/03/22 1509 04/03/22 1508 04/03/22 1508   131/72 97.5 °F (36.4 °C) Oral 69 16 97 % 5' 8\" (1.727 m) 220 lb (99.8 kg)       Physical Exam  Constitutional:       Appearance: Normal appearance. He is well-developed, well-groomed and normal weight. HENT:      Head: Normocephalic. Right Ear: External ear normal.      Left Ear: External ear normal.      Nose: Nose normal.      Mouth/Throat:      Mouth: Mucous membranes are moist.   Eyes:      Extraocular Movements: Extraocular movements intact. Conjunctiva/sclera: Conjunctivae normal.      Pupils: Pupils are equal, round, and reactive to light. Pulmonary:      Effort: Pulmonary effort is normal. No respiratory distress. Musculoskeletal:         General: Normal range of motion. Cervical back: Normal range of motion. Neurological:      Mental Status: He is alert and oriented to person, place, and time. Psychiatric:         Mood and Affect: Mood is anxious. Speech: Speech normal.         Behavior: Behavior normal.         Thought Content: Thought content does not include homicidal or suicidal ideation.            DIAGNOSTIC RESULTS     EKG:All EKG's are interpreted by the Emergency Department Physician who either signs or Co-signs this chart in the absence of a cardiologist.        RADIOLOGY:   Non-plain film images such as CT, Ultrasound and MRI are read by theradiologist. Plain radiographic images are visualized and preliminarily interpreted by the emergency physician with the below findings:        Interpretation per the Radiologist below, if available at the time of this note:    No orders to display         EDBEDSIDE ULTRASOUND:   Performed by José Manuel Blas - none    LABS:  Labs Reviewed - No data to display    All other labs were within normal range or not returned as of this dictation. EMERGENCY DEPARTMENT COURSE andDIFFERENTIAL DIAGNOSIS/MDM:   Patient evaluated in conjunction attending physician. Presents with some anxiety over the past several days. He has had recent stressors in his life. Denies homicidal or suicidal ideations. Also complains of some nausea but no abdominal pain, emesis or diarrhea. He was given Benadryl injection in the ED. Prescriptions written for Xanax and Phenergan. OARRS reviewed. Provide with [facility] same-day physician number for follow-up. Vitals:    Vitals:    04/03/22 1508 04/03/22 1509   BP:  131/72   Pulse:  69   Resp:  16   Temp:  97.5 °F (36.4 °C)   TempSrc:  Oral   SpO2:  97%   Weight: 220 lb (99.8 kg)    Height: 5' 8\" (1.727 m)          CONSULTS:  None    RES:  Procedures    FINAL IMPRESSION      1. Anxiety    2. Nausea          DISPOSITION/PLAN   DISPOSITION Decision To Discharge 04/03/2022 03:34:31 PM      PATIENT REFERRED TO:     Call [phone] to schedule appointment with your primary care provider. In 1 week      [facility] ED  [address]  [phone]    If symptoms worsen      DISCHARGE MEDICATIONS:     Discharge Medication List as of 4/3/2022  3:40 PM      START taking these medications    Details   ALPRAZolam (XANAX) 0.5 MG tablet Take 1 tablet by mouth 2 times daily as needed for Anxiety for up to 2 days. , Disp-4 tablet, R-0Print      !! promethazine (PHENERGAN) 25 MG tablet Take 1 tablet by mouth every 8 hours as needed for Nausea, Disp-9 tablet, R-0Print       !! - Potential duplicate medications found. Please discuss with provider.         Electronically signed by MASSIEL Fajardo 4/3/2022 at 8:07 PM            MASSIEL Fajardo CNP  04/03/22 2009

## 2022-04-04 NOTE — ED PROVIDER NOTES
EMERGENCY DEPARTMENT ENCOUNTER    Pt Name: Jennifer Aguirre  MRN: [id number]  [place] 1990  Date of evaluation: 4/3/22  CHIEF COMPLAINT       Chief Complaint   Patient presents with    Suicidal     HISTORY OF PRESENT ILLNESS   HPI     This is a 35-year-old male with a history of bipolar disorder and drug use who comes in today with suicidal ideation. The patient presented to [facility] earlier today complaining of anxiety. He received Benadryl and received prescriptions for Phenergan and Xanax he denied suicidal ideation at that time. Patient states that he is now suicidal he denies any plans. He denies any drug use today but used fentanyl a few days ago. He denies any d medical complaints at this time. REVIEW OF SYSTEMS     Review of Systems   Constitutional: Negative for fever. HENT: Negative for congestion. Respiratory: Negative for cough and shortness of breath. Cardiovascular: Negative for chest pain. Gastrointestinal: Negative for abdominal pain, diarrhea and vomiting. Genitourinary: Negative for dysuria. Musculoskeletal: Negative for back pain. Skin: Negative for rash. Neurological: Negative for headaches. Psychiatric/Behavioral: Positive for suicidal ideas. All other systems reviewed and are negative.     PASTMEDICAL HISTORY     Past Medical History:   Diagnosis Date    Anxiety     Arthritis     Asthma     Bipolar I disorder, most recent episode (or current) depressed, unspecified 9/12/2014    Clostridium difficile infection     COPD [facility] disease) [facility]     Depression     Disease of blood and blood forming organ     Eczema     Fracture, metacarpal     R 4th and 5th    Gastric ulcer     Gastritis 06/13/2018    GERD (gastroesophageal reflux disease)     GI bleed     H. 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     PUD (peptic ulcer disease)     Rheumatoid arthritis ([facility]     Rheumatoid arthritis(714.0)     Severe recurrent major depression without psychotic features (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     Substance abuse ([facility]     Hx of opiates, benzos, cocaine, alcohol abuse    Suicidal ideation     [facility]     Tobacco dependence     Ulcerative colitis ([facility]     UTI (urinary tract infection)      SURGICAL HISTORY       Past Surgical History:   Procedure Laterality Date    ABDOMEN SURGERY      upper GI scope 7/7/2015    BRONCHOSCOPY      CHOLECYSTECTOMY, LAPAROSCOPIC  07/14/2017    surgery performed at [address] [place] [address], COLON, DIAGNOSTIC      OTHER SURGICAL HISTORY      lumbar puncture    ME COLONOSCOPY W/BIOPSY SINGLE/MULTIPLE  8/9/2017    COLONOSCOPY WITH BIOPSY performed by Myrna King MD at [address] [place] [address] EGD TRANSORAL BIOPSY SINGLE/MULTIPLE N/A 3/20/2017    EGD BIOPSY performed by Elijah Hammond MD at [facility]    ME ESOPHAGOGASTRODUODENOSCOPY TRANSORAL DIAGNOSTIC N/A 8/9/2017    EGD ESOPHAGOGASTRODUODENOSCOPY performed by Myrna King MD at [address] N/A 3/20/2017    SIGMOIDOSCOPY DIAGNOSTIC FLEXIBLE performed by Elijah Hammond MD at [facility]  2/4/16    UPPER GASTROINTESTINAL ENDOSCOPY N/A 6/13/2018    GASTRITIS    UPPER GASTROINTESTINAL ENDOSCOPY N/A 9/20/2018    EGD BIOPSY performed by Shann Spurling, MD at [address] [place] [address]       Previous Medications    ALPRAZOLAM (XANAX) 0.5 MG TABLET    Take 1 tablet by mouth 2 times daily as needed for Anxiety for up to 2 days.     ALUMINUM-MAGNESIUM HYDROXIDE 200-200 MG/5ML SUSPENSION    Take 10 mLs by mouth every 6 hours as needed for Indigestion    AZITHROMYCIN (ZITHROMAX) 250 MG TABLET    TAKE 2 TABLETS BY MOUTH ON DAY 1, THEN TAKE 1 TABLET DAILY ON DAYS 2-5    CHLORPROMAZINE (THORAZINE) 50 MG TABLET    Take 1 tablet by mouth 2 times daily as needed (anxiety)    GABAPENTIN (NEURONTIN) 800 MG TABLET    Take 1 tablet by mouth 4 times daily for 7 days. METFORMIN (GLUCOPHAGE) 500 MG TABLET    Take 1 tablet by mouth 2 times daily (with meals)    OLANZAPINE (ZYPREXA) 15 MG TABLET    Take 1 tablet by mouth nightly    ONDANSETRON (ZOFRAN ODT) 4 MG DISINTEGRATING TABLET    Take 1 tablet by mouth every 8 hours as needed for Nausea    PANTOPRAZOLE (PROTONIX) 40 MG TABLET    Take 1 tablet by mouth every morning (before breakfast)    PROMETHAZINE (PHENERGAN) 12.5 MG TABLET        PROMETHAZINE (PHENERGAN) 25 MG TABLET    Take 1 tablet by mouth every 8 hours as needed for Nausea    SUCRALFATE (CARAFATE) 1 GM TABLET    Take 1 tablet by mouth 4 times daily    TRAZODONE (DESYREL) 50 MG TABLET    Take 1 tablet by mouth nightly as needed for Sleep     ALLERGIES     is allergic to dicyclomine, famotidine, geodon [ziprasidone hcl], haloperidol, iv dye [iodides], reglan [metoclopramide], ibuprofen, aspirin, dicyclomine hcl, hydroxyzine hcl, iodine, metronidazole, mirtazapine, promethazine, and ziprasidone. FAMILY HISTORY     He indicated that his mother is alive. He indicated that his father is alive. He indicated that the status of his sister is unknown. He indicated that the status of his other is unknown. He indicated that the status of his paternal cousin is unknown.      SOCIAL HISTORY       Social History     Tobacco Use    Smoking status: Current Every Day Smoker     Packs/day: 0.50     Years: 15.00     Pack years: 7.50     Types: Cigarettes    Smokeless tobacco: Never Used   Vaping Use    Vaping Use: Former   Substance Use Topics    Alcohol use: Yes     Comment: drinks daily    Drug use: Not Currently     Types: Opiates , Cocaine     Comment: Hx of opiates, benzos, cocaine, alcohol abuse     PHYSICAL EXAM     INITIAL VITALS: /73   Pulse 88 Temp 98.5 °F (36.9 °C) (Oral)   Resp 16   Ht 5' 9\" (1.753 m)   Wt 220 lb (99.8 kg)   SpO2 97%   BMI 32.49 kg/m²    Physical Exam  Vitals and nursing note reviewed. Constitutional:       General: He is not in acute distress. Appearance: He is well-developed. HENT:      Head: Normocephalic and atraumatic. Eyes:      Conjunctiva/sclera: Conjunctivae normal.   Cardiovascular:      Rate and Rhythm: Normal rate and regular rhythm. Heart sounds: No murmur heard. No friction rub. Pulmonary:      Effort: Pulmonary effort is normal. No respiratory distress. Breath sounds: Normal breath sounds. Abdominal:      Palpations: Abdomen is soft. Tenderness: There is no abdominal tenderness. Musculoskeletal:      Cervical back: Neck supple. Skin:     General: Skin is warm and dry. Capillary Refill: Capillary refill takes less than 2 seconds. Neurological:      Mental Status: He is alert. Psychiatric:      Comments: Suicidal         EMERGENCY DEPARTMENTCOURSE:   Differential diagnosis includes exacerbation of chronic mental illness medication noncompliance polysubstance abuse the patient was already evaluated in a different emergency department earlier today where he received outpatient prescriptions for Xanax. He is suicidal with no plan we did speak to Dr. Jackie Cordero from psychiatry who states that the patient does not meet inpatient criteria at this time he will be discharged  With outpatient resources. Vitals:    Vitals:    04/03/22 2138   BP: 123/73   Pulse: 88   Resp: 16   Temp: 98.5 °F (36.9 °C)   TempSrc: Oral   SpO2: 97%   Weight: 220 lb (99.8 kg)   Height: 5' 9\" (1.753 m)         FINAL IMPRESSION      1.  Suicidal ideation         DISPOSITION/PLAN   DISPOSITION Decision To Discharge 04/03/2022 11:11:58 PM      PATIENT REFERRED TO:  [facility] ED  Nate Noleigh [address] [place] [address] [address] [facility]    If symptoms worsen  Jayde Douglass MD  Attending Emergency Physician    This charting supersedes any ED resident or staff charting and was written using speech recognition Heike Pacheco MD  04/03/22 1373

## 2022-04-04 NOTE — ED NOTES
Mode of arrival (squad #, walk in, police, etc) : Walked into triage      Chief complaint(s): Suicidal        Arrival Note (brief scenario, treatment PTA, etc). : Complaining of feeling suicidal and plans to overdose. Denies any previous suicide attempts. States he has been out of his psych meds x 1 week. States he is not connected to any mental health center. States he drinks daily, denies drinking alcohol today. Hx of abuse of pain pills and fentanyl, states he has been clean x 1 week. Hx PTSD, Anxiety and Depression. A/O X 3        C= \"Have you ever felt that you should Cut down on your drinking? \"  No  A= \"Have people Annoyed you by criticizing your drinking? \"  Yes  G= \"Have you ever felt bad or Guilty about your drinking? \"  Yes  E= \"Have you ever had a drink as an Eye-opener first thing in the morning to steady your nerves or to help a hangover? \"  Yes      Deferred []      Reason for deferring: N/A    *If yes to two or more: probable alcohol abuse. Nanda Craft RN  04/03/22 9767

## 2022-04-04 NOTE — ED NOTES
Charley Wilcox is a 32year old male who presents to the ED via cab. Pt is suicidal. Pt has no plan or intent to harm self. Pt stating pt attempted suicide yesterday by trying to overdose on fentanyl. Pt identifies the death of pt's girlfriend years ago as the trigger to this. Pt was seen at [facility] ED earlier today for anxiety. Pt denied SI during pt's encounter at [facility]. Pt was provided with a prescription for pt's anxiety medication at that time. Pt was last admitted to the [facility] 1/7/2022 and did not follow up with pt's outpatient provider after pt's discharge. Pt has a history of non compliance with treatment. According to pt's chart pt frequents the ED seeking anxiety medication. DEZ consulted with Dario Kruse from psychiatry. Pt is well known to psychiatry. Pt declined an admission at this time as pt has been admitted to the [facility] multiple times and will not benefit from an admission. Pt is non compliant with follow up treatment, medications, and displays drug seeking behaviors. DEZ consulted with ED Dr. ED Dr and DEZ agree pt is safe to be discharged. . Pt is not a risk to self or others at this time. Pt is malingering and is trying to be admitted to the hospital for secondary gain.

## 2022-04-04 NOTE — ED NOTES
Pt strongly encouraged to follow up with an outpatient mental health provider and stressed the importance of outpatient follow up to pt. SW encouraged pt to return to the hospital or call 911 if pt's symptoms worsen or pt is having thoughts of wanting to harm self/others. Pt verbalized pt's understanding. SW provided pt with pt's belongings and discharge paperwork. Pt stated pt will call himself a cab. Pt cooperatively exited the [facility].

## 2022-04-05 ENCOUNTER — HOSPITAL ENCOUNTER (INPATIENT)
Age: 32
LOS: 3 days | Discharge: HOME | DRG: 885 | End: 2022-04-08
Attending: EMERGENCY MEDICINE | Admitting: PSYCHIATRY & NEUROLOGY
Payer: COMMERCIAL

## 2022-04-05 DIAGNOSIS — R45.850 HOMICIDAL IDEATIONS: ICD-10-CM

## 2022-04-05 DIAGNOSIS — R45.851 SUICIDAL IDEATIONS: Primary | ICD-10-CM

## 2022-04-05 PROBLEM — F33.2 SEVERE EPISODE OF RECURRENT MAJOR DEPRESSIVE DISORDER, WITHOUT PSYCHOTIC FEATURES (HCC): Status: ACTIVE | Noted: 2022-04-05

## 2022-04-05 LAB
ABSOLUTE EOS #: 0.1 K/UL (ref 0–0.4)
ABSOLUTE LYMPH #: 2.2 K/UL (ref 1–4.8)
ABSOLUTE MONO #: 0.5 K/UL (ref 0.1–1.3)
ACETAMINOPHEN LEVEL: <5 UG/ML (ref 10–30)
AMPHETAMINE SCREEN URINE: NEGATIVE
ANION GAP SERPL CALCULATED.3IONS-SCNC: 10 MMOL/L (ref 9–17)
BARBITURATE SCREEN URINE: NEGATIVE
BASOPHILS # BLD: 1 % (ref 0–2)
BASOPHILS ABSOLUTE: 0.1 K/UL (ref 0–0.2)
BENZODIAZEPINE SCREEN, URINE: NEGATIVE
BILIRUBIN URINE: NEGATIVE
BUN BLDV-MCNC: 11 MG/DL (ref 6–20)
CALCIUM SERPL-MCNC: 8.8 MG/DL (ref 8.6–10.4)
CANNABINOID SCREEN URINE: NEGATIVE
CHLORIDE BLD-SCNC: 102 MMOL/L (ref 98–107)
CO2: 25 MMOL/L (ref 20–31)
COCAINE METABOLITE, URINE: POSITIVE
COLOR: YELLOW
COMMENT UA: ABNORMAL
CREAT SERPL-MCNC: 0.93 MG/DL (ref 0.7–1.2)
EOSINOPHILS RELATIVE PERCENT: 1 % (ref 0–4)
ETHANOL PERCENT: <0.01 %
ETHANOL: <10 MG/DL
GFR AFRICAN AMERICAN: >60 ML/MIN
GFR NON-AFRICAN AMERICAN: >60 ML/MIN
GFR SERPL CREATININE-BSD FRML MDRD: ABNORMAL ML/MIN/{1.73_M2}
GLUCOSE BLD-MCNC: 104 MG/DL (ref 70–99)
GLUCOSE URINE: NEGATIVE
HCT VFR BLD CALC: 42.2 % (ref 41–53)
HEMOGLOBIN: 14.1 G/DL (ref 13.5–17.5)
KETONES, URINE: ABNORMAL
LEUKOCYTE ESTERASE, URINE: NEGATIVE
LYMPHOCYTES # BLD: 27 % (ref 24–44)
MCH RBC QN AUTO: 29.6 PG (ref 26–34)
MCHC RBC AUTO-ENTMCNC: 33.6 G/DL (ref 31–37)
MCV RBC AUTO: 88.1 FL (ref 80–100)
METHADONE SCREEN, URINE: NEGATIVE
MONOCYTES # BLD: 7 % (ref 1–7)
NITRITE, URINE: NEGATIVE
OPIATES, URINE: NEGATIVE
OXYCODONE SCREEN URINE: POSITIVE
PDW BLD-RTO: 13.7 % (ref 11.5–14.9)
PH UA: 5.5 (ref 5–8)
PHENCYCLIDINE, URINE: NEGATIVE
PLATELET # BLD: 260 K/UL (ref 150–450)
PMV BLD AUTO: 8 FL (ref 6–12)
POTASSIUM SERPL-SCNC: 4 MMOL/L (ref 3.7–5.3)
PROTEIN UA: NEGATIVE
RBC # BLD: 4.79 M/UL (ref 4.5–5.9)
SALICYLATE LEVEL: <1 MG/DL (ref 3–10)
SARS-COV-2, RAPID: NOT DETECTED
SEG NEUTROPHILS: 64 % (ref 36–66)
SEGMENTED NEUTROPHILS ABSOLUTE COUNT: 5.1 K/UL (ref 1.3–9.1)
SODIUM BLD-SCNC: 137 MMOL/L (ref 135–144)
SPECIFIC GRAVITY UA: 1.03 (ref 1–1.03)
SPECIMEN DESCRIPTION: NORMAL
TEST INFORMATION: ABNORMAL
TOXIC TRICYCLIC SC,BLOOD: ABNORMAL
TRICYCLIC ANTIDEP,URINE: NEGATIVE
TURBIDITY: CLEAR
URINE HGB: NEGATIVE
UROBILINOGEN, URINE: NORMAL
WBC # BLD: 7.9 K/UL (ref 3.5–11)

## 2022-04-05 PROCEDURE — 80048 BASIC METABOLIC PNL TOTAL CA: CPT

## 2022-04-05 PROCEDURE — 81003 URINALYSIS AUTO W/O SCOPE: CPT

## 2022-04-05 PROCEDURE — 36415 COLL VENOUS BLD VENIPUNCTURE: CPT

## 2022-04-05 PROCEDURE — 1240000000 HC EMOTIONAL WELLNESS R&B

## 2022-04-05 PROCEDURE — 85025 COMPLETE CBC W/AUTO DIFF WBC: CPT

## 2022-04-05 PROCEDURE — 6370000000 HC RX 637 (ALT 250 FOR IP): Performed by: EMERGENCY MEDICINE

## 2022-04-05 PROCEDURE — 80179 DRUG ASSAY SALICYLATE: CPT

## 2022-04-05 PROCEDURE — 99285 EMERGENCY DEPT VISIT HI MDM: CPT

## 2022-04-05 PROCEDURE — 80143 DRUG ASSAY ACETAMINOPHEN: CPT

## 2022-04-05 PROCEDURE — 6370000000 HC RX 637 (ALT 250 FOR IP): Performed by: PSYCHIATRY & NEUROLOGY

## 2022-04-05 PROCEDURE — 80307 DRUG TEST PRSMV CHEM ANLYZR: CPT

## 2022-04-05 PROCEDURE — G0480 DRUG TEST DEF 1-7 CLASSES: HCPCS

## 2022-04-05 PROCEDURE — 87635 SARS-COV-2 COVID-19 AMP PRB: CPT

## 2022-04-05 RX ORDER — IBUPROFEN 400 MG/1
400 TABLET ORAL EVERY 6 HOURS PRN
Status: DISCONTINUED | OUTPATIENT
Start: 2022-04-05 | End: 2022-04-06 | Stop reason: DRUGHIGH

## 2022-04-05 RX ORDER — DIPHENHYDRAMINE HCL 25 MG
25 TABLET ORAL ONCE
Status: COMPLETED | OUTPATIENT
Start: 2022-04-05 | End: 2022-04-05

## 2022-04-05 RX ORDER — CHLORPROMAZINE HYDROCHLORIDE 25 MG/ML
50 INJECTION INTRAMUSCULAR 3 TIMES DAILY PRN
Status: DISCONTINUED | OUTPATIENT
Start: 2022-04-05 | End: 2022-04-08 | Stop reason: HOSPADM

## 2022-04-05 RX ORDER — POLYETHYLENE GLYCOL 3350 17 G/17G
17 POWDER, FOR SOLUTION ORAL DAILY PRN
Status: DISCONTINUED | OUTPATIENT
Start: 2022-04-05 | End: 2022-04-08 | Stop reason: HOSPADM

## 2022-04-05 RX ORDER — HYDROXYZINE 50 MG/1
50 TABLET, FILM COATED ORAL 3 TIMES DAILY PRN
Status: DISCONTINUED | OUTPATIENT
Start: 2022-04-05 | End: 2022-04-08 | Stop reason: HOSPADM

## 2022-04-05 RX ORDER — MAGNESIUM HYDROXIDE/ALUMINUM HYDROXICE/SIMETHICONE 120; 1200; 1200 MG/30ML; MG/30ML; MG/30ML
30 SUSPENSION ORAL EVERY 6 HOURS PRN
Status: DISCONTINUED | OUTPATIENT
Start: 2022-04-05 | End: 2022-04-08 | Stop reason: HOSPADM

## 2022-04-05 RX ORDER — TRAZODONE HYDROCHLORIDE 50 MG/1
50 TABLET ORAL NIGHTLY PRN
Status: DISCONTINUED | OUTPATIENT
Start: 2022-04-05 | End: 2022-04-08 | Stop reason: HOSPADM

## 2022-04-05 RX ORDER — MECLIZINE HYDROCHLORIDE 25 MG/1
25 TABLET ORAL ONCE
Status: COMPLETED | OUTPATIENT
Start: 2022-04-05 | End: 2022-04-05

## 2022-04-05 RX ORDER — CHLORPROMAZINE HYDROCHLORIDE 25 MG/1
50 TABLET, FILM COATED ORAL 3 TIMES DAILY PRN
Status: DISCONTINUED | OUTPATIENT
Start: 2022-04-05 | End: 2022-04-08 | Stop reason: HOSPADM

## 2022-04-05 RX ORDER — ACETAMINOPHEN 325 MG/1
650 TABLET ORAL EVERY 6 HOURS PRN
Status: DISCONTINUED | OUTPATIENT
Start: 2022-04-05 | End: 2022-04-08 | Stop reason: HOSPADM

## 2022-04-05 RX ADMIN — TRAZODONE HYDROCHLORIDE 50 MG: 50 TABLET ORAL at 20:35

## 2022-04-05 RX ADMIN — MECLIZINE HYDROCHLORIDE 25 MG: 25 TABLET ORAL at 17:16

## 2022-04-05 RX ADMIN — DIPHENHYDRAMINE HCL 25 MG: 25 TABLET ORAL at 17:16

## 2022-04-05 ASSESSMENT — SLEEP AND FATIGUE QUESTIONNAIRES
DO YOU USE A SLEEP AID: YES
SLEEP PATTERN: DIFFICULTY FALLING ASLEEP;DISTURBED/INTERRUPTED SLEEP;EARLY AWAKENING;INSOMNIA;RESTLESSNESS
DIFFICULTY ARISING: NO
DIFFICULTY FALLING ASLEEP: YES
RESTFUL SLEEP: NO
DO YOU HAVE DIFFICULTY SLEEPING: YES
DIFFICULTY STAYING ASLEEP: YES

## 2022-04-05 ASSESSMENT — LIFESTYLE VARIABLES: HISTORY_ALCOHOL_USE: NO

## 2022-04-05 ASSESSMENT — PAIN SCALES - GENERAL: PAINLEVEL_OUTOF10: 0

## 2022-04-05 ASSESSMENT — PATIENT HEALTH QUESTIONNAIRE - PHQ9: SUM OF ALL RESPONSES TO PHQ QUESTIONS 1-9: 18

## 2022-04-05 NOTE — ED PROVIDER NOTES
[address] ED  eMERGENCY dEPARTMENT eNCOUnter      Pt Name: Bertha Elizabeth  MRN: [id number]  YOB: 1990  Date of evaluation: 4/5/22  PCP: None Provider    CHIEF COMPLAINT:   Chief Complaint   Patient presents with   [address]     HISTORY OF PRESENT ILLNESS    Bertha Elizabeth is a 32 y.o. male who presents with a chief complaint of suicidal and homicidal ideations. Patient states he recently found out from his ex-wife's family that he has a 11year-old daughter. He states he feels homicidal towards these people for keeping this information from and also feels suicidal now with a plan to stab himself in the neck. Has not attempted any self-harm. No alcohol or drug use today. He does acknowledge being in the hospital multiple times over the past several days but states he did not have all this information at that time so did not communicate this when he was in the hospital.  He was discharged before. No recent fevers, chills other illnesses. Only other complaint is some itching in his bilateral arms and is requesting Benadryl. Symptoms are acute. Symptoms are moderate. Nothing make symptoms better or worse. Patient has no other complaints at this time. REVIEW OF SYSTEMS       Constitutional: Denies recent fever, chills. Neck: No neck pain. Respiratory: Denies recent shortness of breath. Cardiac:  Denies recent chest pain. GI: Denies any recent abdominal pain nausea or vomiting. : Denies dysuria. Musculoskeletal: Denies focal weakness. Neurologic: Denies headache or focal weakness. Skin: Positive for rash  Psychiatric: Positive for suicidal and homicidal ideations    Negative in 10 essential Systems except as mentioned above and in the HPI.         PAST MEDICAL HISTORY   PMH:  has a past medical history of Anxiety, Arthritis, Asthma, Bipolar I disorder, most recent episode (or current) depressed, unspecified, Clostridium difficile infection, COPD (chronic obstructive pulmonary disease) ([facility] [address] Depression, Disease of blood and blood forming organ, Eczema, Fracture, metacarpal, Gastric ulcer, Gastritis, GERD (gastroesophageal reflux disease), GI bleed, H. pylori infection, H/O blood clots, Head injury, Headache, Insomnia, Juvenile rheumatoid arthritis (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 PUD (peptic ulcer disease), Rheumatoid arthritis ([facility] Rheumatoid arthritis(714.0), Severe recurrent major depression without psychotic features (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 Substance abuse ([facility] Suicidal ideation, Suicide attempt by hanging ([facility] Tobacco dependence, Ulcerative colitis ([facility] and UTI (urinary tract infection). Surgical History:  has a past surgical history that includes Colonoscopy; bronchoscopy; other surgical history; Upper gastrointestinal endoscopy (2/4/16); pr egd transoral biopsy single/multiple (N/A, 3/20/2017); sigmoidoscopy (N/A, 3/20/2017); Cholecystectomy, laparoscopic (07/14/2017); pr esophagogastroduodenoscopy transoral diagnostic (N/A, 8/9/2017); pr colonoscopy w/biopsy single/multiple (8/9/2017); Abdomen surgery; Upper gastrointestinal endoscopy (N/A, 6/13/2018); Upper gastrointestinal endoscopy (N/A, 9/20/2018); and Endoscopy, colon, diagnostic. Social History:  reports that he has been smoking cigarettes. He has a 7.50 pack-year smoking history. He has never used smokeless tobacco. He reports current alcohol use. He reports previous drug use. Drugs: Opiates  and Cocaine. Family History: Noncontributory at this time  Psychiatric History: See PMH  Allergies:is allergic to dicyclomine, famotidine, geodon [ziprasidone hcl], haloperidol, iv dye [iodides], reglan [metoclopramide], ibuprofen, aspirin, dicyclomine hcl, hydroxyzine hcl, iodine, metronidazole, mirtazapine, promethazine, and ziprasidone.       PHYSICAL EXAM     INITIAL VITALS: BP: 123/75  Pulse: 74  Resp: 18  Temp: 97.7 °F (36.5 °C) SpO2: 98 %     Constitutional:  Well developed, no acute distress   Eyes:  Pupils equal and readily reactive to light  HENT:  Atraumatic, external ears normal, nose normal, oropharynx moist. Neck- supple    Respiratory:  Clear to auscultation bilaterally with good air exchange  Cardiovascular:  RRR with normal S1 and S2  GI:  Soft, nondistended and nontender   Musculoskeletal:  No edema, no tenderness, no deformities  Integument:  No rash  Neurologic:  Alert & oriented x 4, no focal deficits noted   Psychiatric: Angry affect      EMERGENCY DEPARTMENT COURSE     19-year-old male presents to emergency department on his own with thoughts of suicide and homicide with a plan to stab himself. He is afebrile, nontoxic, normal vital signs. No acute distress. Patient did have 3 other emergency department visits over the past several days however based on chart review it does not seem like he had a plan of suicide at that time and did not have all this information about his daughter at that time. We will get lab work, present case to psychiatry. As far as his rash I do not appreciate any rash on exam however will treat symptomatically. He is medically cleared at this time. FINAL IMPRESSION     1. Suicidal ideations    2. Homicidal ideations          DISPOSITION:  DISPOSITION          PATIENT REFERRED TO:  No follow-up provider specified. DISCHARGE MEDICATIONS:  New Prescriptions    No medications on file       The care is provided during an unprecedented national emergency due to the novel coronavirus, COVID-19. (Please note that portions of this note were completed with a voice recognition program. Efforts were made to edit the dictations but occasionally words are mis-transcribed.  Whenever words are used in this note in any gender, they shall be construed as though they were used in the gender appropriate to the circumstances; and whenever words are used in this note in the singular or plural form, they shall be construed as though they were used in the form appropriate to the circumstances.)    Alessandro Archibald DO  Attending Emergency Medicine Physician        Alessandro Archibald DO  04/05/22 2116

## 2022-04-05 NOTE — BH NOTE
Patient given tobacco quitline number 13632993128 at this time, refusing to call at this time, states \" I just dont want to quit now\"- patient given information as to the dangers of long term tobacco use. Continue to reinforce the importance of tobacco cessation.

## 2022-04-05 NOTE — ED NOTES
Provisional Diagnosis:   Major Depressive Disorder    Psychosocial and Contextual Factors:   Patient reports he recently found out his  former girlfriend's family hid that patient had a now 11year old daughter. Patient reports this has made him suicdial and homicidal.    C-SSRS Summary:      Patient: X  Family:   Agency:     Substance Abuse: Patient has poly substance abuse. Present Suicidal Behavior:  Patient reports suicdial ideation with a plan to cut his throat with a knife and overdose. Verbal: X    Attempt:X    Past Suicidal Behavior: Patient reports past suicidal ideation with plans. Verbal:    Attempt:      Self-Injurious/Self-Mutilation: Patient denies. Violence Current or Past : Patient has a history of violence per chart. Patient calm and cooperative while in ED today. Protective Factors:    Patient has housing with his girlfriend. Risk Factors:    Patient has a history of malingering. Clinical Summary:    Luis Jones is a 32 y.o. male who presents to the ED for suicdial and homicidal ideation. Patient states he is suicidal with a plan to \"overdose of stab a knife into my throat. \"   Patient states a few days ago he found out how his former girlfriend had  by an overdose in 2018. Patient states that even more recently he found out that this former girlfriend had patient's child, a daughter, and nobody told him. Patient states his former girlfriends brother and sister told him that the daughter was his, and that they hid it from him because they did not think he was fit to be a father. Patient states find this information out made him  suicdiall. Patient states this has also made him homicidal and wanting to kill them for withholding this information. Patient states, \"you can't withhold that information for me that I have a beautiful daughter. I have been messing around, lost and trying to find the truth for 4 years and I could have been there. \"    Patient states he wants to be admitted to help with his sucidial and homicidal ideation, get stable on medications, so that he can be there for his daughter and work with  Her family and show them that he is stable. Patient states \"right now I am still mad and want to kill them, but I want to get over it so I can be there for my daughter. \"    Patient states \"I need to be admitted more than ever. I have never been more serious about getting help and making sure i'll go to [facility] when I am done. \"    Patient states he was being prescribe suboxone, but states he relapsed on Fentanyl. Patient then states he began going to [facility] for Methadone treatment. Patient states he is not currently linked with outpatient mental health services. Patient has a history of frequent ED visits. Patient displays no abnormal movements, speech rate, or tone. Articulations were within normal limits. Patients memory was intact. Thought form was linear. Patient denies obsessions or compulsions. Patient denies homicidal ideation. Patient denies auditory and visual hallucinations. Level of Care Disposition:    Writer consulted with Dr. Germán Grant who recommends inpatient psychiatric admission for safety and stabilization. Patient is in agreement and signed application for voluntary admission.

## 2022-04-05 NOTE — ED TRIAGE NOTES
Mode of arrival (squad #, walk in, police, etc) : walk in        Chief complaint(s): SI        Arrival Note (brief scenario, treatment PTA, etc). : onset \"a couple of days\". PT SI included taking a knife to his throat      C= \"Have you ever felt that you should Cut down on your drinking? \"  no  A= \"Have people Annoyed you by criticizing your drinking? \"  no  G= \"Have you ever felt bad or Guilty about your drinking? \"  no  E= \"Have you ever had a drink as an Eye-opener first thing in the morning to steady your nerves or to help a hangover? \"  no      Deferred []      Reason for deferring: na    *If yes to two or more: probable alcohol abuse. *

## 2022-04-05 NOTE — BH NOTE
[facility]  Admission Note     Admission Type:   Admission Type: Voluntary    Reason for admission:  Reason for Admission: Suicidal Ideation with plan to stab self. HI towards  girlfriend's family due to not telling him he has a daughter.     PATIENT STRENGTHS:  Strengths: Communication,Connection to output provider,Positive Support,Social Skills    Patient Strengths and Limitations:  Limitations: Difficulty problem solving/relies on others to help solve problems,Difficult relationships / poor social skills    Addictive Behavior:   Addictive Behavior  In the past 3 months, have you felt or has someone told you that you have a problem with:  : None  Do you have a history of Chemical Use?: Yes  Do you have a history of Alcohol Use?: No  Do you have a history of Street Drug Abuse?: Yes  Histroy of Prescripton Drug Abuse?: Yes    Medical Problems:   Past Medical History:   Diagnosis Date    Anxiety     Arthritis     Asthma     Bipolar I disorder, most recent episode (or current) depressed, unspecified 2014    Clostridium difficile infection     COPD (chronic obstructive pulmonary disease) ([address]     Depression     Disease of blood and blood forming organ     Eczema     Fracture, metacarpal     R 4th and 5th    Gastric ulcer     Gastritis 2018    GERD (gastroesophageal reflux disease)     GI bleed     H. 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[address]     Substance abuse ([address]     Hx of opiates, benzos, cocaine, alcohol abuse    Suicidal ideation     Suicide attempt by hanging ([address]     Tobacco dependence     Ulcerative colitis [facility]     UTI (urinary tract infection)        Status EXAM:  Status and Exam  Normal: No  Facial Expression: Worried  Affect: Appropriate  Level of Consciousness: Alert  Mood:Normal: No  Mood: Depressed,Anxious  Motor Activity:Normal: Yes  Interview Behavior: Cooperative  Preception: [place] to Person,[place] to Time,[place] to Place,[place] to Situation  Attention:Normal: No  Attention: Distractible  Thought Processes: Circumstantial  Thought Content:Normal: No  Thought Content: Preoccupations  Hallucinations: None  Delusions: No  Memory:Normal: Yes  Insight and Judgment: No  Insight and Judgment: Poor Insight,Poor Judgment  Present Suicidal Ideation: No  Present Homicidal Ideation: No    Tobacco Screening:  Practical Counseling, on admission, reuben X, if applicable and completed (first 3 are required if patient doesn't refuse):            ( )  Recognizing danger situations (included triggers and roadblocks)                    ( )  Coping skills (new ways to manage stress, exercise, relaxation techniques, changing routine, distraction)                                                           ( )  Basic information about quitting (benefits of quitting, techniques in how to quit, available resources  ( ) Referral for counseling faxed to [facility]                                           (X ) Patient refused counseling  ( ) Patient has not smoked in the last 30 days    Metabolic Screening:    Lab Results   Component Value Date    LABA1C 5.7 10/23/2014       Lab Results   Component Value Date    CHOL 205 (H) 02/21/2017    CHOL 158 11/09/2015    CHOL 152 10/23/2014    CHOL 206 (H) 03/05/2014    CHOL 205 (H) 01/22/2014    CHOL 208 (H) 08/28/2013     Lab Results   Component Value Date    TRIG 189 (H) 02/21/2017    TRIG 223 (H) 11/09/2015    TRIG 52 10/23/2014    TRIG 104 03/05/2014    TRIG 74 01/22/2014    TRIG 104 08/28/2013     Lab Results   Component Value Date    HDL 44 02/21/2017 HDL 40 (L) 11/09/2015    HDL 55 10/23/2014    HDL 54 03/05/2014    HDL 53 01/22/2014    HDL 72 (H) 08/28/2013     No components found for: LDLCAL  No results found for: LABVLDL      Body mass index is 32.49 kg/m². BP Readings from Last 2 Encounters:   04/05/22 124/71   04/03/22 123/73           Pt admitted with followings belongings:  Dental Appliances: None  Vision - Corrective Lenses: None  Hearing Aid: None  Jewelry: None  Body Piercings Removed: N/A  Clothing: [place] Perish / coat,Pants,Shirt,Socks,Undergarments (Comment),Other (Comment) (see written)  Were All Patient Medications Collected?: Yes  Other Valuables: Cell phone,Wallet,Other (Comment) (see written)     Patient's home medications were N/A. Patient oriented to surroundings and program expectations and copy of patient rights given. Received admission packet:  Yes. Consents reviewed, signed Yes. Refused N/A. Patient verbalize understanding:  Yes. Patient education on precautions: Yes    Patient admitted to room 214 bed [address] [place] [address] at 640 pm.   Patient changed into hospital attire, scanned with metal detector. Food and beverages provided to patient. Patient currently denies thoughts of self harm.                          Elle Williamson RN

## 2022-04-06 PROBLEM — E11.69 DIABETES MELLITUS TYPE 2 IN OBESE (HCC): Status: ACTIVE | Noted: 2022-04-06

## 2022-04-06 PROBLEM — E78.2 MIXED HYPERLIPIDEMIA: Status: ACTIVE | Noted: 2022-04-06

## 2022-04-06 PROBLEM — E66.9 DIABETES MELLITUS TYPE 2 IN OBESE (HCC): Status: ACTIVE | Noted: 2022-04-06

## 2022-04-06 PROCEDURE — 1240000000 HC EMOTIONAL WELLNESS R&B

## 2022-04-06 PROCEDURE — 6370000000 HC RX 637 (ALT 250 FOR IP): Performed by: PSYCHIATRY & NEUROLOGY

## 2022-04-06 PROCEDURE — 99223 1ST HOSP IP/OBS HIGH 75: CPT | Performed by: INTERNAL MEDICINE

## 2022-04-06 PROCEDURE — 90792 PSYCH DIAG EVAL W/MED SRVCS: CPT | Performed by: PSYCHIATRY & NEUROLOGY

## 2022-04-06 PROCEDURE — 6370000000 HC RX 637 (ALT 250 FOR IP)

## 2022-04-06 PROCEDURE — APPSS60 APP SPLIT SHARED TIME 46-60 MINUTES

## 2022-04-06 RX ORDER — ONDANSETRON 4 MG/1
4 TABLET, FILM COATED ORAL EVERY 8 HOURS PRN
Status: DISCONTINUED | OUTPATIENT
Start: 2022-04-06 | End: 2022-04-08 | Stop reason: HOSPADM

## 2022-04-06 RX ORDER — DULOXETIN HYDROCHLORIDE 20 MG/1
20 CAPSULE, DELAYED RELEASE ORAL DAILY
Status: DISCONTINUED | OUTPATIENT
Start: 2022-04-06 | End: 2022-04-08 | Stop reason: HOSPADM

## 2022-04-06 RX ORDER — CLONIDINE HYDROCHLORIDE 0.1 MG/1
0.1 TABLET ORAL PRN
Status: DISCONTINUED | OUTPATIENT
Start: 2022-04-06 | End: 2022-04-08 | Stop reason: HOSPADM

## 2022-04-06 RX ORDER — IBUPROFEN 800 MG/1
800 TABLET ORAL EVERY 8 HOURS PRN
Status: DISCONTINUED | OUTPATIENT
Start: 2022-04-06 | End: 2022-04-08 | Stop reason: HOSPADM

## 2022-04-06 RX ORDER — DICYCLOMINE HCL 20 MG
20 TABLET ORAL EVERY 6 HOURS PRN
Status: DISCONTINUED | OUTPATIENT
Start: 2022-04-06 | End: 2022-04-06

## 2022-04-06 RX ADMIN — HYDROXYZINE HYDROCHLORIDE 50 MG: 50 TABLET, FILM COATED ORAL at 20:39

## 2022-04-06 RX ADMIN — HYDROXYZINE HYDROCHLORIDE 50 MG: 50 TABLET, FILM COATED ORAL at 11:56

## 2022-04-06 RX ADMIN — HYDROXYZINE HYDROCHLORIDE 50 MG: 50 TABLET, FILM COATED ORAL at 18:34

## 2022-04-06 RX ADMIN — NICOTINE POLACRILEX 2 MG: 2 GUM, CHEWING BUCCAL at 17:15

## 2022-04-06 RX ADMIN — NICOTINE POLACRILEX 2 MG: 2 GUM, CHEWING BUCCAL at 11:56

## 2022-04-06 RX ADMIN — TRAZODONE HYDROCHLORIDE 50 MG: 50 TABLET ORAL at 20:39

## 2022-04-06 RX ADMIN — NICOTINE POLACRILEX 2 MG: 2 GUM, CHEWING BUCCAL at 18:31

## 2022-04-06 RX ADMIN — NICOTINE POLACRILEX 2 MG: 2 GUM, CHEWING BUCCAL at 20:38

## 2022-04-06 RX ADMIN — NICOTINE POLACRILEX 2 MG: 2 GUM, CHEWING BUCCAL at 15:24

## 2022-04-06 RX ADMIN — DULOXETINE HYDROCHLORIDE 20 MG: 20 CAPSULE, DELAYED RELEASE ORAL at 11:56

## 2022-04-06 NOTE — PLAN OF CARE
Problem: Anger Management/Homicidal Ideation:  Goal: Able to display appropriate communication and problem solving  Description: Able to display appropriate communication and problem solving  Outcome: Ongoing     Problem: Altered Mood, Depressive Behavior:  Goal: Absence of self-harm  Description: Absence of self-harm  Outcome: Ongoing   Denies suicidal thoughts and remains free from harm at this time. Demonstrating  appropriate problem solving skills.

## 2022-04-06 NOTE — PLAN OF CARE
[facility]  Initial Interdisciplinary Treatment Plan NO      Original treatment plan Date & Time: 2022   0906    Admission Type:  Admission Type: Voluntary    Reason for admission:   Reason for Admission: Suicidal Ideation with plan to stab self. HI towards  girlfriend's family due to not telling him he has a daughter.     Estimated Length of Stay:  5-7days  Estimated Discharge Date: to be determined by physician    PATIENT STRENGTHS:  Patient Strengths:Strengths: Communication,Connection to output provider,Positive Support,Social Skills  Patient Strengths and Limitations:Limitations: Difficulty problem solving/relies on others to help solve problems,Difficult relationships / poor social skills  Addictive Behavior: Addictive Behavior  In the past 3 months, have you felt or has someone told you that you have a problem with:  : None  Do you have a history of Chemical Use?: Yes  Do you have a history of Alcohol Use?: No  Do you have a history of Street Drug Abuse?: Yes  Histroy of Prescripton Drug Abuse?: Yes  Medical Problems:  Past Medical History:   Diagnosis Date    Anxiety     Arthritis     Asthma     Bipolar I disorder, most recent episode (or current) depressed, unspecified 2014    Clostridium difficile infection     COPD (chronic obstructive pulmonary disease) ([address]     Depression     Disease of blood and blood forming organ     Eczema     Fracture, metacarpal     R 4th and 5th    Gastric ulcer     Gastritis 2018    GERD (gastroesophageal reflux disease)     GI bleed     H. 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[address]     PUD (peptic ulcer disease)     Rheumatoid arthritis ([address]     Rheumatoid arthritis(714.0)     Severe recurrent major depression without psychotic features ([address] 2021    Sleep apnea     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[address] Substance abuse ([facility]     Hx of opiates, benzos, cocaine, alcohol abuse    Suicidal ideation     Suicide attempt by hanging ([facility]     Tobacco dependence     Ulcerative colitis ([facility]     UTI (urinary tract infection)      Status EXAM:Status and Exam  Normal: No  Facial Expression: Worried  Affect: Appropriate  Level of Consciousness: Alert  Mood:Normal: No  Mood: Anxious  Motor Activity:Normal: No  Motor Activity: Decreased  Interview Behavior: Cooperative  Preception: [place] to Person,[place] to Time,[place] to Place,[place] to Situation  Attention:Normal: Yes  Attention: Distractible  Thought Processes: Circumstantial  Thought Content:Normal: No  Thought Content: Preoccupations  Hallucinations: None  Delusions: No  Memory:Normal: No  Memory: Poor Recent  Insight and Judgment: No  Insight and Judgment: Poor Judgment,Poor Insight  Present Suicidal Ideation: No  Present Homicidal Ideation: No    EDUCATION:   Learner Progress Toward Treatment Goals: reviewed group plans and strategies for care    Method:group therapy, medication compliance, individualized assessments and care planning    Outcome: needs reinforcement    PATIENT GOALS: to be discussed with patient within 72 hours    PLAN/TREATMENT RECOMMENDATIONS:     continue group therapy , medications compliance, goal setting, individualized assessments and care, continue to monitor pt on unit      SHORT-TERM GOALS:   Time frame for Short-Term Goals: 5-7 days    LONG-TERM GOALS:  Time frame for Long-Term Goals: 6 months  Members Present in Team Meeting: See Signature Sheet    Leonora Armando

## 2022-04-06 NOTE — H&P
[facility]  Chava Gracia MD; Johny Hyde MD; Mac Greene MD; MD General Supriya Colbert MD; MD ARBEN [facility] Internal [facility]   [address] / HISTORY AND PHYSICAL EXAMINATION            Date:   4/6/2022  Patient name:  Martita Marte  Date of admission:  4/5/2022  4:35 PM  MRN:   [id number]  Account:  [de-identified]  YOB: 1990  PCP:    None Provider  Room:   [address] [place] [address]  Code Status:    Full Code    Physician Requesting Consult: Carmen Melendez, *    Reason for Consult: Medical management    Chief Complaint:     Chief Complaint   Patient presents with    Mental Health Problem   Suicidal ideations    History Obtained From:     patient    History of Present Illness:     31-year gentleman with underlying history of COPD, smoking, anxiety, depression, asthma, hypertension, hyperlipidemia, diabetes mellitus, obesity, admitted to inpatient psych for suicidal ideations    Past Medical History:     Past Medical History:   Diagnosis Date    Anxiety     Arthritis     Asthma     Bipolar I disorder, most recent episode (or current) depressed, unspecified 9/12/2014    Clostridium difficile infection     COPD (chronic obstructive pulmonary disease) ([address]     Depression     Disease of blood and blood forming organ     Eczema     Fracture, metacarpal     R 4th and 5th    Gastric ulcer     Gastritis 06/13/2018    GERD (gastroesophageal reflux disease)     GI bleed     H. 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[address]  Substance abuse ([facility]     Hx of opiates, benzos, cocaine, alcohol abuse    Suicidal ideation     Su[facility] by hanging ([facility]     Tobacco dependence     Ulcerative colitis ([facility]     UTI (urinary tract infection)         Past Surgical History:     Past Surgical History:   Procedure Laterality Date    ABDOMEN SURGERY      upper GI scope 7/7/2015    BRONCHOSCOPY      CHOLECYSTECTOMY, LAPAROSCOPIC  07/14/2017    surgery performed at [address] [place] [address], COLON, DIAGNOSTIC      OTHER SURGICAL HISTORY      lumbar puncture    MN COLONOSCOPY W/BIOPSY SINGLE/MULTIPLE  8/9/2017    COLONOSCOPY WITH BIOPSY performed by Julieth Man MD at [facility]    MN EGD TRANSORAL BIOPSY SINGLE/MULTIPLE N/A 3/20/2017    EGD BIOPSY performed by Tyrell Monsalve MD at [facility]    MN ESOPHAGOGASTRODUODENOSCOPY TRANSORAL DIAGNOSTIC N/A 8/9/2017    EGD ESOPHAGOGASTRODUODENOSCOPY performed by Julieth Man MD at [address] N/A 3/20/2017    SIGMOIDOSCOPY DIAGNOSTIC FLEXIBLE performed by Tyrell Monsalve MD at [facility]  2/4/16    UPPER GASTROINTESTINAL ENDOSCOPY N/A 6/13/2018    GASTRITIS    UPPER GASTROINTESTINAL ENDOSCOPY N/A 9/20/2018    EGD BIOPSY performed by Theron Florentino MD at [address]        Medications Prior to Admission:     Prior to Admission medications    Medication Sig Start Date End Date Taking? Authorizing Provider   promethazine (PHENERGAN) 25 MG tablet Take 1 tablet by mouth every 8 hours as needed for Nausea 4/3/22 4/6/22  MASSIEL Richards - CNP   OLANZapine (ZYPREXA) 15 MG tablet Take 1 tablet by mouth nightly 1/10/22   Zachariah Talavera MD   metFORMIN (GLUCOPHAGE) 500 MG tablet Take 1 tablet by mouth 2 times daily (with meals) 1/10/22   Zachariah Talavera MD   gabapentin (NEURONTIN) 800 MG tablet Take 1 tablet by mouth 4 times daily for 7 days.   Patient taking differently: Take 800 mg by mouth 4 times daily. Last dose on 4/3/22 1/3/22 1/14/22  Rita Hernandez, APRN - CNP   traZODone (DESYREL) 50 MG tablet Take 1 tablet by mouth nightly as needed for Sleep 1/3/22   Rita Hernandez APRN - CNP   meloxicam [facility]) 7.5 MG tablet Take 1 tablet by mouth 2 times daily as needed for Pain 8/19/21 10/30/21  Rita Hernandez APRN - CNP        Allergies:     Dicyclomine, Famotidine, Geodon [ziprasidone hcl], Haloperidol, Iv dye [iodides], Reglan [metoclopramide], Ibuprofen, Aspirin, Dicyclomine hcl, Hydroxyzine hcl, Iodine, Metronidazole, Mirtazapine, Promethazine, and Ziprasidone    Social History:     Tobacco:    reports that he has been smoking cigarettes. He has a 7.50 pack-year smoking history. He has never used smokeless tobacco.  Alcohol:      reports current alcohol use. Drug Use:  reports previous drug use. Drugs: Opiates  and Cocaine. Family History:     Family History   Problem Relation Age of Onset    Diabetes Father     Alcohol Abuse Father     Depression Father     Arthritis Father     High Blood Pressure Father     Other Father         aneurysm & epilepsy    Migraines Father     Arthritis Mother     Other Mother         aneurysm & epilepsy    Migraines Mother     Diabetes Brother         Aunt and uncles    Depression Brother     Mental Illness Brother     Other Brother         epilepsy    Migraines Brother     Stroke Other         Uncle    Other Brother         murdered Oct 6th, 2014    Colon Cancer Paternal Cousin 43    Other Sister         epilepsy    Migraines Sister        Review of Systems:     Positive and Negative as described in HPI. CONSTITUTIONAL:  negative for fevers, chills, sweats, fatigue, weight loss  HEENT:  negative for vision, hearing changes, runny nose, throat pain  RESPIRATORY:  negative for shortness of breath, cough, congestion, wheezing. CARDIOVASCULAR:  negative for chest pain, palpitations.   GASTROINTESTINAL:  negative for nausea, vomiting, diarrhea, constipation, change in bowel habits, abdominal pain   GENITOURINARY:  negative for difficulty of urination, burning with urination, frequency   INTEGUMENT:  negative for rash, skin lesions, easy bruising   HEMATOLOGIC/LYMPHATIC:  negative for swelling/edema   ALLERGIC/IMMUNOLOGIC:  negative for urticaria , itching  ENDOCRINE:  negative increase in drinking, increase in urination, hot or cold intolerance  MUSCULOSKELETAL:  negative joint pains, muscle aches, swelling of joints  NEUROLOGICAL:  negative for headaches, dizziness, lightheadedness, numbness, pain, tingling extremities      Physical Exam:     /71   Pulse 83   Temp 97.5 °F (36.4 °C) (Oral)   Resp 16   Ht 5' 9\" (1.753 m)   Wt 220 lb (99.8 kg)   SpO2 100%   BMI 32.49 kg/m²   Temp (24hrs), Av.6 °F (36.4 °C), Min:97.5 °F (36.4 °C), Max:97.7 °F (36.5 °C)    No results for input(s): POCGLU in the last 72 hours. No intake or output data in the 24 hours ending 22 1226    General Appearance:  alert, well appearing, and in no acute distress  Mental status: oriented to person, place, and time with normal affect  Head:  normocephalic, atraumatic. Eye: no icterus, redness, pupils equal and reactive, extraocular eye movements intact, conjunctiva clear  Ear: normal external ear, no discharge, hearing intact  Nose:  no drainage noted  Mouth: mucous membranes moist  Neck: supple, no carotid bruits, thyroid not palpable  Lungs: Bilateral equal air entry, clear to ausculation, no wheezing, rales or rhonchi, normal effort  Cardiovascular: normal rate, regular rhythm, no murmur, gallop, rub.   Abdomen: Soft, nontender, nondistended, normal bowel sounds, no hepatomegaly or splenomegaly  Neurologic: There are no new focal motor or sensory deficits, normal muscle tone and bulk, no abnormal sensation, normal speech, cranial nerves II through XII grossly intact  Skin: No gross lesions, rashes, bruising or bleeding on exposed skin area  Extremities:  peripheral pulses palpable, no pedal edema or calf pain with palpation  Psych: normal affect    Investigations:      Laboratory Testing:  Recent Results (from the past 24 hour(s))   CBC with Auto Differential    Collection Time: 04/05/22  5:04 PM   Result Value Ref Range    WBC 7.9 3.5 - 11.0 k/uL    RBC 4.79 4.5 - 5.9 m/uL    Hemoglobin 14.1 13.5 - 17.5 g/dL    Hematocrit 42.2 41 - 53 %    MCV 88.1 80 - 100 fL    MCH 29.6 26 - 34 pg    MCHC 33.6 31 - 37 g/dL    RDW 13.7 11.5 - 14.9 %    Platelets 717 524 - 942 k/uL    MPV 8.0 6.0 - 12.0 fL    Seg Neutrophils 64 36 - 66 %    Lymphocytes 27 24 - 44 %    Monocytes 7 1 - 7 %    Eosinophils % 1 0 - 4 %    Basophils 1 0 - 2 %    Segs Absolute 5.10 1.3 - 9.1 k/uL    Absolute Lymph # 2.20 1.0 - 4.8 k/uL    Absolute Mono # 0.50 0.1 - 1.3 k/uL    Absolute Eos # 0.10 0.0 - 0.4 k/uL    Basophils Absolute 0.10 0.0 - 0.2 k/uL   Basic Metabolic Panel    Collection Time: 04/05/22  5:04 PM   Result Value Ref Range    Glucose 104 (H) 70 - 99 mg/dL    BUN 11 6 - 20 mg/dL    CREATININE 0.93 0.70 - 1.20 mg/dL    Calcium 8.8 8.6 - 10.4 mg/dL    Sodium 137 135 - 144 mmol/L    Potassium 4.0 3.7 - 5.3 mmol/L    Chloride 102 98 - 107 mmol/L    CO2 25 20 - 31 mmol/L    Anion Gap 10 9 - 17 mmol/L    GFR Non-African American >60 >60 mL/min    GFR African American >60 >60 mL/min    GFR Comment         TOX SCR, BLD, ED    Collection Time: 04/05/22  5:04 PM   Result Value Ref Range    Acetaminophen Level <5 (L) 10 - 30 ug/mL    Ethanol <10 <10 mg/dL    Ethanol percent <1.416 %    Salicylate Lvl <1 (L) 3 - 10 mg/dL    Toxic Tricyclic Sc,Blood WRONG TEST ORDERED NEGATIVE   COVID-19, Rapid    Collection Time: 04/05/22  5:05 PM    Specimen: Nasopharyngeal Swab   Result Value Ref Range    Specimen Description . NASOPHARYNGEAL SWAB     SARS-CoV-2, Rapid Not Detected Not Detected   Urine Drug Screen    Collection Time: 04/05/22  5:06 PM   Result Value Ref Range    Amphetamine Screen, Ur NEGATIVE NEGATIVE    Barbiturate Screen, Ur NEGATIVE NEGATIVE    Benzodiazepine Screen, Urine NEGATIVE NEGATIVE    Cocaine Metabolite, Urine POSITIVE (A) NEGATIVE    Methadone Screen, Urine NEGATIVE NEGATIVE    Opiates, Urine NEGATIVE NEGATIVE    Phencyclidine, Urine NEGATIVE NEGATIVE    Cannabinoid Scrn, Ur NEGATIVE NEGATIVE    Oxycodone Screen, Ur POSITIVE (A) NEGATIVE    Test Information       Assay provides medical screening only. The absence of expected drug(s) and/or metabolite(s) may indicate diluted or adulterated urine, limitations of testing or timing of collection. Urinalysis with Reflex to Culture    Collection Time: 04/05/22  5:06 PM    Specimen: Urine, clean catch   Result Value Ref Range    Color, UA Yellow Yellow    Turbidity UA Clear Clear    Glucose, Ur NEGATIVE NEGATIVE    Bilirubin Urine NEGATIVE NEGATIVE    Ketones, Urine TRACE (A) NEGATIVE    Specific Gravity, UA 1.026 1.000 - 1.030    Urine Hgb NEGATIVE NEGATIVE    pH, UA 5.5 5.0 - 8.0    Protein, UA NEGATIVE NEGATIVE    Urobilinogen, Urine Normal Normal    Nitrite, Urine NEGATIVE NEGATIVE    Leukocyte Esterase, Urine NEGATIVE NEGATIVE    Urinalysis Comments       Microscopic exam not performed based on chemical results unless requested in original order. Drug screen, tricyclic    Collection Time: 04/05/22  5:06 PM   Result Value Ref Range    Tricyclic Antidep,Urine NEGATIVE NEGATIVE       Imaging/Diagonstics:  No results found.     Assessment :      Hospital Problems           Last Modified POA    * (Principal) Severe episode of recurrent major depressive disorder, without psychotic features ([facility] 4/5/2022 Yes    SRIRAM (obstructive sleep apnea) 4/6/2022 Yes    Primary insomnia 4/6/2022 Yes    Gastritis 4/6/2022 Yes    Schizoaffective disorder, bipolar type ([address] (Chronic) 4/6/2022 Yes    Diabetes mellitus type 2 in obese ([address] 4/6/2022 Yes    Mixed hyperlipidemia 4/6/2022 Yes          Plan:     1. 31-year gentleman admitted to inpatient psych for major depression and suicidal ideations,  2. Obstructive sleep apnea, possibly will bring the home CPAP, he may be noncompliant,  3. Primary insomnia, medication per psych,  4. Gastritis, PPI,  5. Diabetes mellitus, continue Metformin, check A1c,  6. Mixed hyperlipidemia, check lipid profile,  7. Obesity, low-calorie diet,  8. DVT prophylaxis,  9. Full CODE STATUS    Consultations:   IP CONSULT TO INTERNAL MEDICINE      Raina Goodwin MD  4/6/2022  12:26 PM    Copy sent to Dr. Shiela Ramires Provider    Please note that this chart was generated using voice recognition Dragon dictation software. Although every effort was made to ensure the accuracy of this automated transcription, some errors in transcription may have occurred.

## 2022-04-06 NOTE — GROUP NOTE
Group Therapy Note    Date: 4/6/2022    Group Start Time: 1100  Group End Time: 8277  Group Topic: Recreational    TIESHA Horner        Group Therapy Note    Pt did not attend recreational group d/t resting in room despite staff invitation to attend. 1:1 talk time offered as alternative to group session, pt declined.

## 2022-04-06 NOTE — PLAN OF CARE
Problem: Altered Mood, Depressive Behavior:  Goal: Able to verbalize acceptance of life and situations over which he or she has no control  Description: Able to verbalize acceptance of life and situations over which he or she has no control  Outcome: Ongoing     Problem: Altered Mood, Depressive Behavior:  Goal: Absence of self-harm  Description: Absence of self-harm  4/6/2022 1005 by Zachariah Peng LPN  Outcome: Ongoing   Patient denies intent to harm self, remains free from self harm.  Support and encouragement provided
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
## 2022-04-06 NOTE — PROGRESS NOTES
Behavioral Services  Medicare Certification Upon Admission    I certify that this patient's inpatient psychiatric hospital admission is medically necessary for:    [x] (1) Treatment which could reasonably be expected to improve this patient's condition,       [x] (2) Or for diagnostic study;     AND     [x](2) The inpatient psychiatric services are provided while the individual is under the care of a physician and are included in the individualized plan of care.     Estimated length of stay/service 3-5 days    Plan for post-hospital care cmhc    Electronically signed by Carmen Melendez MD on 4/6/2022 at 10:55 AM

## 2022-04-06 NOTE — CARE COORDINATION
BHI Biopsychosocial Assessment    Current Level of Psychosocial Functioning     Independent: xx  Dependent:    Minimal Assist:       Psychosocial High Risk Factors (check all that apply)    Unable to obtain meds:    Chronic illness/pain:     Substance abuse: xx  Lack of Family Support:  xx  Financial stress:    Isolation:    Inadequate Community Resources:    Suicide attempt(s):    Not taking medications: xx   Victim of crime:    Developmental Delay:    Unable to manage personal needs:    Age 72 or older:    Homeless:    No transportation:    Readmission within 30 days:    Unemployment:    Traumatic Event:      Psychiatric Advanced Directives: None reported    Family to Involve in Treatment: Patient reports strained relationships with family    Sexual Orientation: RANDAL    Patient Strengths: patient is connected with [facility], has stable income    Patient Barriers: non compliance with medications     Opiate Education: Patient reports fentanyl use, provided with AOD packet and education    CMHC/mental health history: Patient reports he is connected with [facility] and wishes to continue with [facility][1] at discharge    Plan of Care   medication management, group/individual therapies, family meetings, psycho -education, treatment team meetings to assist with stabilization    Initial Discharge Plan: Patient reports he wishes to continue services with [facility][1] at discharge, will return to his apartment      Clinical Summary:  Patient is a 32year old male who presents to [facility] via [facility]. Patient presented with reported suicidal and homicidal thoughts after learning her has a 11year old daughter. Upon interview today patient denies homicidal ideations, denies auditory or visual hallucinations. Patient does report lingering suicidal ideations. Patient states he stopped taking medications about 2 months ago, \"I thought I could do without. \" Patient states his hospital goal is to get back on medications, long term goal is to have a relationship with his daughter and family. Patient reports fentanyl use, is undecided if he would like MAT at discharge, stating if he is through withdrawals he does not want suboxone. Patient will be scheduled for dual services at [facility], MAT if he decides. Patient denies suicide attempts in the past. Social work will continue to engage patient in treatment and discharge planing as symptoms improve.

## 2022-04-06 NOTE — PROGRESS NOTES
Pharmacy Medication History Note      List of current medications patient is taking is complete. Source of information: [facility], [place], [place]    Changes made to medication list:  Medications removed (include reason, ex. therapy complete or physician discontinued, noncompliance):  Azithromycin (therapy completed), Pantoprazole (list clean up), Maalox (list clean up), Ondansetron (list clean up), Chlorpromazine (list clean up), Sucralfate (list clean up), Meloxicam (list clean up)    Medications added/doses adjusted:  none    Other notes (ex. Recent course of antibiotics, Coumadin dosing): The patient last fill his psychiatric medications in January using meds to beds. Over the last month, the patient has had short supplies of Alprazolam 0.5 mg (4/3/22, qty 4), Suboxone 4-1 mg film (3/4/22, qty 14), and Gabapentin 300 mg (3/4/22, qty 21). Please let me know if you have any questions about this encounter. Thank you!     Electronically signed by Weston Araiza, [facility] on 4/6/2022 at 9:07 AM

## 2022-04-06 NOTE — GROUP NOTE
Group Therapy Note    Date: 4/6/2022    Group Start Time: 4932  Group End Time: 1500  Group Topic: Music Therapy    TIESHA Luevano        Group Therapy Note    Pt did not attend music therapy group d/t resting in room despite staff invitation to attend. 1:1 talk time offered as alternative to group session, pt declined.

## 2022-04-07 VITALS
TEMPERATURE: 97.5 F | SYSTOLIC BLOOD PRESSURE: 145 MMHG | WEIGHT: 220 LBS | HEART RATE: 83 BPM | DIASTOLIC BLOOD PRESSURE: 69 MMHG | OXYGEN SATURATION: 100 % | BODY MASS INDEX: 32.58 KG/M2 | RESPIRATION RATE: 14 BRPM | HEIGHT: 69 IN

## 2022-04-07 PROCEDURE — 6360000002 HC RX W HCPCS

## 2022-04-07 PROCEDURE — APPSS30 APP SPLIT SHARED TIME 16-30 MINUTES

## 2022-04-07 PROCEDURE — 99232 SBSQ HOSP IP/OBS MODERATE 35: CPT | Performed by: INTERNAL MEDICINE

## 2022-04-07 PROCEDURE — 6370000000 HC RX 637 (ALT 250 FOR IP): Performed by: PSYCHIATRY & NEUROLOGY

## 2022-04-07 PROCEDURE — 99232 SBSQ HOSP IP/OBS MODERATE 35: CPT | Performed by: PSYCHIATRY & NEUROLOGY

## 2022-04-07 PROCEDURE — 6360000002 HC RX W HCPCS: Performed by: PSYCHIATRY & NEUROLOGY

## 2022-04-07 PROCEDURE — 1240000000 HC EMOTIONAL WELLNESS R&B

## 2022-04-07 PROCEDURE — 6370000000 HC RX 637 (ALT 250 FOR IP)

## 2022-04-07 RX ORDER — BUPRENORPHINE AND NALOXONE 2; .5 MG/1; MG/1
1 FILM, SOLUBLE BUCCAL; SUBLINGUAL DAILY
Status: DISCONTINUED | OUTPATIENT
Start: 2022-04-07 | End: 2022-04-08 | Stop reason: HOSPADM

## 2022-04-07 RX ORDER — DIPHENHYDRAMINE HYDROCHLORIDE 50 MG/ML
25 INJECTION INTRAMUSCULAR; INTRAVENOUS ONCE
Status: COMPLETED | OUTPATIENT
Start: 2022-04-07 | End: 2022-04-07

## 2022-04-07 RX ADMIN — NICOTINE POLACRILEX 2 MG: 2 GUM, CHEWING BUCCAL at 18:34

## 2022-04-07 RX ADMIN — HYDROXYZINE HYDROCHLORIDE 50 MG: 50 TABLET, FILM COATED ORAL at 20:02

## 2022-04-07 RX ADMIN — NICOTINE POLACRILEX 2 MG: 2 GUM, CHEWING BUCCAL at 14:24

## 2022-04-07 RX ADMIN — DULOXETINE HYDROCHLORIDE 20 MG: 20 CAPSULE, DELAYED RELEASE ORAL at 08:45

## 2022-04-07 RX ADMIN — NICOTINE POLACRILEX 2 MG: 2 GUM, CHEWING BUCCAL at 15:26

## 2022-04-07 RX ADMIN — BUPRENORPHINE AND NALOXONE 1 FILM: 2; .5 FILM BUCCAL; SUBLINGUAL at 14:37

## 2022-04-07 RX ADMIN — NICOTINE POLACRILEX 2 MG: 2 GUM, CHEWING BUCCAL at 17:27

## 2022-04-07 RX ADMIN — TRAZODONE HYDROCHLORIDE 50 MG: 50 TABLET ORAL at 20:33

## 2022-04-07 RX ADMIN — NICOTINE POLACRILEX 2 MG: 2 GUM, CHEWING BUCCAL at 20:05

## 2022-04-07 RX ADMIN — NICOTINE POLACRILEX 2 MG: 2 GUM, CHEWING BUCCAL at 10:01

## 2022-04-07 RX ADMIN — NICOTINE POLACRILEX 2 MG: 2 GUM, CHEWING BUCCAL at 12:28

## 2022-04-07 RX ADMIN — DIPHENHYDRAMINE HYDROCHLORIDE 25 MG: 50 INJECTION, SOLUTION INTRAMUSCULAR; INTRAVENOUS at 11:20

## 2022-04-07 RX ADMIN — HYDROXYZINE HYDROCHLORIDE 50 MG: 50 TABLET, FILM COATED ORAL at 17:27

## 2022-04-07 RX ADMIN — NICOTINE POLACRILEX 2 MG: 2 GUM, CHEWING BUCCAL at 08:45

## 2022-04-07 RX ADMIN — NICOTINE POLACRILEX 2 MG: 2 GUM, CHEWING BUCCAL at 11:01

## 2022-04-07 NOTE — GROUP NOTE
HS Group   Date: April 6, 2022     Patient did not participate in HS group. 1:1 talk time was offered as an alternative. Will continue to encourage patient to participate in unit programming.      Signature: GABRIELLE Shankar

## 2022-04-07 NOTE — PLAN OF CARE
Problem: Anger Management/Homicidal Ideation:  Goal: Able to display appropriate communication and problem solving  Description: Able to display appropriate communication and problem solving  Outcome: Ongoing     Problem: Altered Mood, Depressive Behavior:  Goal: Able to verbalize acceptance of life and situations over which he or she has no control  Description: Able to verbalize acceptance of life and situations over which he or she has no control  Outcome: Ongoing     Problem: Altered Mood, Depressive Behavior:  Goal: Absence of self-harm  Description: Absence of self-harm  Outcome: Ongoing     Problem: Tobacco Use:  Goal: Inpatient tobacco use cessation counseling participation  Description: Inpatient tobacco use cessation counseling participation  Outcome: Ongoing   Patient has been uncooperative with vitals, has answered all assessments; patient struggles to display appropriate communication when talking with peers and staff; Patient struggles to accept life and situations over which he has no control; patient remains absent of self-harm; Patient given tobacco quitline number 64696566151 at this time, refusing to call at this time, states \" I just dont want to quit now\"- patient given information as to the dangers of long term tobacco use. Continue to reinforce the importance of tobacco cessation.

## 2022-04-07 NOTE — FLOWSHEET NOTE
*Patient participated in [facility]        04/07/22 1508   Encounter Summary   Services provided to: Patient   Referral/Consult From: Rounding   Continue Visiting   (4/7/22)   Complexity of Encounter Moderate   Length of Encounter 15 minutes   Spiritual Assessment Completed Yes   Spiritual/Shinto   Type Spiritual support   Assessment Calm; Approachable   Intervention Active listening;Prayer   Outcome Receptive;Engaged in conversation;Expressed gratitude

## 2022-04-07 NOTE — GROUP NOTE
Group Therapy Note    Date: 4/7/2022    Group Start Time: 1330  Group End Time: 7732  Group Topic: Music Therapy    [facility]    Minus Rater        Group Therapy Note    Pt did not attend MUSIC THERAPY group d/t resting in room despite staff invitation to attend. 1:1 talk time offered as alternative to group session, pt declined.

## 2022-04-07 NOTE — GROUP NOTE
Group Therapy Note    Date: 4/7/2022    Group Start Time: 1005  Group End Time: 5892  Group Topic: Psychotherapy    KRISH Willson, MERCYW        Group Therapy Note    Attendees: 7/15       Patient was offered group therapy today but declined to participate despite encouragement from staff. 1:1 was offered.         Signature:  KRISH Clayton, SALLY

## 2022-04-07 NOTE — PLAN OF CARE
[facility]  Day 3 Interdisciplinary Treatment Plan NOTE    Review Date & Time: 2022   0928    Admission Type:   Admission Type: Voluntary    Reason for admission:  Reason for Admission: Suicidal Ideation with plan to stab self. HI towards  girlfriend's family due to not telling him he has a daughter.   Estimated Length of Stay: 5-7 days  Estimated Discharge Date Update: to be determined by physician    PATIENT STRENGTHS:  Patient Strengths Strengths: Communication,Connection to output provider,Positive Support,Social Skills  Patient Strengths and Limitations:Limitations: Difficult relationships / poor social skills,Difficulty problem solving/relies on others to help solve problems,Inappropriate/potentially harmful leisure interests,Tendency to isolate self  Addictive Behavior:Addictive Behavior  In the past 3 months, have you felt or has someone told you that you have a problem with:  : None  Do you have a history of Chemical Use?: Yes  Do you have a history of Alcohol Use?: No  Do you have a history of Street Drug Abuse?: Yes  Histroy of Prescripton Drug Abuse?: Yes  Medical Problems:  Past Medical History:   Diagnosis Date    Anxiety     Arthritis     Asthma     Bipolar I disorder, most recent episode (or current) depressed, unspecified 2014    Clostridium difficile infection     COPD (chronic obstructive pulmonary disease) ([address]     Depression     Disease of blood and blood forming organ     Eczema     Fracture, metacarpal     R 4th and 5th    Gastric ulcer     Gastritis 2018    GERD (gastroesophageal reflux disease)     GI bleed     H. 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[facility]     Substance abuse (HCC)     Hx of opiates, benzos, cocaine, alcohol abuse    Suicidal ideation     Suicide attempt by hanging ([facility]     Tobacco dependence     Ulcerative colitis ([facility]     UTI (urinary tract infection)        Risk:  Fall RiskTotal: 55  Ross Scale Ross Scale Score: 22  BVC    Change in scores no Changes to plan of Care no    Status EXAM:   Status and Exam  Normal: Yes  Facial Expression: Brightened  Affect: Appropriate  Level of Consciousness: Alert  Mood:Normal: Yes  Mood: Anxious  Motor Activity:Normal: No  Motor Activity: Decreased  Interview Behavior: Cooperative  Preception: [place] to Person,[place] to Time,[place] to Place,[place] to Situation  Attention:Normal: Yes  Attention: Distractible  Thought Processes: Circumstantial  Thought Content:Normal: Yes  Thought Content: Preoccupations  Hallucinations: None  Delusions: No  Memory:Normal: Yes  Memory: Poor Recent  Insight and Judgment: No  Insight and Judgment: Poor Judgment,Poor Insight  Present Suicidal Ideation: No  Present Homicidal Ideation: No    Daily Assessment Last Entry:   Daily Sleep (WDL): Within Defined Limits         Patient Currently in Pain: Denies  Daily Nutrition (WDL): Within Defined Limits    Patient Monitoring:  Frequency of Checks: 4 times per hour, close    Psychiatric Symptoms:   Depression Symptoms  Depression Symptoms: No problems reported or observed. Anxiety Symptoms  Anxiety Symptoms: No problems reported or observed. Elizabeth Symptoms  Elizabeth Symptoms: No problems reported or observed. Psychosis Symptoms  Delusion Type: No problems reported or observed.     Suicide Risk CSSR-S:  1) Within the past month, have you wished you were dead or wished you could go to sleep and not wake up? : No  2) Have you actually had any thoughts of killing yourself? : Yes  3) Have you been thinking about how you might kill yourself? : Yes  5) Have you started to work out or worked out the details of how to kill yourself? Do you intend to carry out this plan? : No  6) Have you ever done anything, started to do anything, or prepared to do anything to end your life?: No  Change in Result no Change in Plan of care no      EDUCATION:   EDUCATION:   Learner Progress Toward Treatment Goals: Reviewed results and recommendations of this team, Reviewed group plan and strategies, Reviewed signs, symptoms and risk of self harm and violent behavior, Reviewed goals and plan of care    Method:small group, individual verbal education    Outcome:verbalized by patient, but needs reinforcement to obtain goals    PATIENT GOALS:  Short term: Link with [facility]  Long term:Maintain sobriety; Look into outpatient program for substance use; Attend outpatient groups;  Work on improving relationship with daughter    PLAN/TREATMENT RECOMMENDATIONS UPDATE: continue with group therapies, increased socialization, continue planning for after discharge goals, continue with medication compliance    SHORT-TERM GOALS UPDATE:   Time frame for Short-Term Goals: 5-7 days    LONG-TERM GOALS UPDATE:   Time frame for Long-Term Goals: 6 months  Members Present in Team Meeting: See Signature Sheet    Bedford Goldmann

## 2022-04-07 NOTE — PROGRESS NOTES
Daily Progress Note  4/7/2022    Patient Name: Emily Zaragoza    CHIEF COMPLAINT:  Depression with homicidal and suicidal ideation          SUBJECTIVE:      Patient is seen today for a follow up assessment. Patient is compliant with scheduled Cymbalta. Patient has not required emergency medications in the past 24 hours. Patient appears to be very discharge focused today. He approaches this writer and states, Cotter [place] I need to talk to you. \"  He informs this writer that he is ready to go. He states, \"I just want to get back to taking care of my daughter. \"  Patient appears to lack significant insight into his mental illness and drug addiction. He would be very unsafe out of the hospital at this time due to lack of insight and possibility that he would relapse on drugs once discharged without a safe plan. He reports that his mood has improved. He reports that he slept well last night and that his appetite is adequate. He reports significant improvement in feelings of hopeless and helplessness. Clover Koch reports improvement in suicidal ideation today saying the thoughts are much less intense and severe. He reports improvement in homicidal ideation as well. He denies auditory and visual hallucinations. He denies paranoia. He does request to be started on Suboxone so that he can \"get his life together and be there for his daughter. \" This writer encouraged patient to look into inpatient AOD treatment and to make some phone calls to see if any facility had open beds. Patient acknowledged his understanding. He denies any medication side effects or other medical concerns at this time. He has been isolative to his room and not attended any groups on the unit.     Appetite:  [x] Normal/Adequate/Unchanged  [] Increased  [] Decreased      Sleep:       [x] Normal/Adequate/Unchanged  [] Fair  [] Poor      Group Attendance on Unit:   [] Yes  [] Selectively    [x] No    Medication Side Effects:  Patient denies any medication side effects at the time of assessment. Mental Status Exam  Level of consciousness: Alert and awake. Appearance: Appropriate attire for setting, seated on bed, with fair  grooming and hygiene. Behavior/Motor: Approachable but somewhat intrusive  Attitude toward examiner: Cooperative, attentive, good eye contact. Speech: Normal rate, normal volume, normal tone. Mood:  Patient reports \"good\". Affect: Euthymic  Thought processes: Linear and coherent. Thought content: Reports improvement in homicidal ideation. Suicidal Ideation: Reports improvement in suicidal ideations  Delusions: No evidence of delusions. Denies paranoia. Perceptual Disturbance: Patient does not appear to be responding to internal stimuli. Denies auditory hallucinations. Denies visual hallucinations. Cognition: Oriented to self, location, time, and situation. Memory: Intact. Insight & Judgement: Poor. Data   height is 5' 9\" (1.753 m) and weight is 220 lb (99.8 kg). His oral temperature is 97.5 °F (36.4 °C). His blood pressure is 124/71 and his pulse is 83. His respiration is 16 and oxygen saturation is 100%.    Labs:   Admission on 04/05/2022   Component Date Value Ref Range Status    WBC 04/05/2022 7.9  3.5 - 11.0 k/uL Final    RBC 04/05/2022 4.79  4.5 - 5.9 m/uL Final    Hemoglobin 04/05/2022 14.1  13.5 - 17.5 g/dL Final    Hematocrit 04/05/2022 42.2  41 - 53 % Final    MCV 04/05/2022 88.1  80 - 100 fL Final    MCH 04/05/2022 29.6  26 - 34 pg Final    MCHC 04/05/2022 33.6  31 - 37 g/dL Final    RDW 04/05/2022 13.7  11.5 - 14.9 % Final    Platelets 66/89/0009 260  150 - 450 k/uL Final    MPV 04/05/2022 8.0  6.0 - 12.0 fL Final    Seg Neutrophils 04/05/2022 64  36 - 66 % Final    Lymphocytes 04/05/2022 27  24 - 44 % Final    Monocytes 04/05/2022 7  1 - 7 % Final    Eosinophils % 04/05/2022 1  0 - 4 % Final    Basophils 04/05/2022 1  0 - 2 % Final    Segs Absolute 04/05/2022 5.10  1.3 - 9.1 k/uL Final    Absolute Lymph # 04/05/2022 2.20  1.0 - 4.8 k/uL Final    Absolute Mono # 04/05/2022 0.50  0.1 - 1.3 k/uL Final    Absolute Eos # 04/05/2022 0.10  0.0 - 0.4 k/uL Final    Basophils Absolute 04/05/2022 0.10  0.0 - 0.2 k/uL Final    Glucose 04/05/2022 104* 70 - 99 mg/dL Final    BUN 04/05/2022 11  6 - 20 mg/dL Final    CREATININE 04/05/2022 0.93  0.70 - 1.20 mg/dL Final    Calcium 04/05/2022 8.8  8.6 - 10.4 mg/dL Final    Sodium 04/05/2022 137  135 - 144 mmol/L Final    Potassium 04/05/2022 4.0  3.7 - 5.3 mmol/L Final    Chloride 04/05/2022 102  98 - 107 mmol/L Final    CO2 04/05/2022 25  20 - 31 mmol/L Final    Anion Gap 04/05/2022 10  9 - 17 mmol/L Final    GFR Non- 04/05/2022 >60  >60 mL/min Final    GFR  04/05/2022 >60  >60 mL/min Final    GFR Comment 04/05/2022        Final    Comment: Average GFR for 30-36 years old:   80 mL/min/1.73sq m  Chronic Kidney Disease:   <60 mL/min/1.73sq m  Kidney failure:   <15 mL/min/1.73sq m              eGFR calculated using average adult body mass.  Additional eGFR calculator available at:        [email or web address]            Acetaminophen Level 04/05/2022 <5* 10 - 30 ug/mL Final    Ethanol 04/05/2022 <10  <10 mg/dL Final    Ethanol percent 04/05/2022 <8.962  % Final    Salicylate Lvl 48/01/0907 <1* 3 - 10 mg/dL Final    Toxic Tricyclic Sc,Blood 64/47/6674 WRONG TEST ORDERED  NEGATIVE Final    Amphetamine Screen, Ur 04/05/2022 NEGATIVE  NEGATIVE Final    Comment:       (Positive cutoff 1000 ng/mL)                  Barbiturate Screen, Ur 04/05/2022 NEGATIVE  NEGATIVE Final    Comment:       (Positive cutoff 200 ng/mL)                  Benzodiazepine Screen, Urine 04/05/2022 NEGATIVE  NEGATIVE Final    Comment:       (Positive cutoff 200 ng/mL)                  Cocaine Metabolite, Urine 04/05/2022 POSITIVE* NEGATIVE Final    Comment:       (Positive cutoff 300 ng/mL)                  Methadone Screen, Urine 04/05/2022 NEGATIVE  NEGATIVE Final    Comment:       (Positive cutoff 300 ng/mL)                  Opiates, Urine 04/05/2022 NEGATIVE  NEGATIVE Final    Comment:       (Positive cutoff 300 ng/mL)                  Phencyclidine, Urine 04/05/2022 NEGATIVE  NEGATIVE Final    Comment:       (Positive cutoff 25 ng/mL)                  Cannabinoid Scrn, Ur 04/05/2022 NEGATIVE  NEGATIVE Final    Comment:       (Positive cutoff 50 ng/mL)                  Oxycodone Screen, Ur 04/05/2022 POSITIVE* NEGATIVE Final    Comment:       (Positive cutoff 100 ng/mL)                  Test Information 04/05/2022 Assay provides medical screening only. The absence of expected drug(s) and/or metabolite(s) may indicate diluted or adulterated urine, limitations of testing or timing of collection. Final    Comment: Testing for legal purposes should be confirmed by another method. To request confirmation   of test result, please call the lab within 7 days of sample submission.  Color, UA 04/05/2022 Yellow  Yellow Final    Turbidity UA 04/05/2022 Clear  Clear Final    Glucose, Ur 04/05/2022 NEGATIVE  NEGATIVE Final    Bilirubin Urine 04/05/2022 NEGATIVE  NEGATIVE Final    Ketones, Urine 04/05/2022 TRACE* NEGATIVE Final    Specific Gravity, UA 04/05/2022 1.026  1.000 - 1.030 Final    Urine Hgb 04/05/2022 NEGATIVE  NEGATIVE Final    pH, UA 04/05/2022 5.5  5.0 - 8.0 Final    Protein, UA 04/05/2022 NEGATIVE  NEGATIVE Final    Urobilinogen, Urine 04/05/2022 Normal  Normal Final    Nitrite, Urine 04/05/2022 NEGATIVE  NEGATIVE Final    Leukocyte Esterase, Urine 04/05/2022 NEGATIVE  NEGATIVE Final    Urinalysis Comments 04/05/2022 Microscopic exam not performed based on chemical results unless requested in original order. Final    Specimen Description 04/05/2022 . NASOPHARYNGEAL SWAB   Final    SARS-CoV-2, Rapid 04/05/2022 Not Detected  Not Detected Final    Comment:       Rapid NAAT:  The specimen is NEGATIVE for SARS-CoV-2, the novel coronavirus associated with   COVID-19. The ID NOW COVID-19 assay is designed to detect the virus that causes COVID-19 in patients   with signs and symptoms of infection who are suspected of COVID-19. An individual without symptoms of COVID-19 and who is not shedding SARS-CoV-2 virus would   expect to have a negative (not detected) result in this assay. Negative results should be treated as presumptive and, if inconsistent with clinical signs   and symptoms or necessary for patient management,  should be tested with an alternative molecular assay. Negative results do not preclude   SARS-CoV-2 infection and   should not be used as the sole basis for patient management decisions. Fact sheet for Healthcare Providers: [employer].es  Fact sheet for Patients: [employer].es          Methodology: Isothermal Nucleic Acid Amplification      Tricyclic Antidep,Urine 63/62/1117 NEGATIVE  NEGATIVE Final    Comment:       (Positive cutoff 1000 ng/mL)  Assay provides rapid clinical screening only. Presumptive positive results for legal   purposes should be confirmed by another method. To request confirmation, please call the   lab within 7 days of sample submission. Reviewed patient's current plan of care and vital signs with nursing staff.     Labs reviewed: [x] Yes  Last EKG in EMR reviewed: [x] Yes  QTc: 418    Medications  Current Facility-Administered Medications: buprenorphine-naloxone (SUBOXONE) 2-0.5 MG SL film 1 Film, 1 Film, SubLINGual, Daily  ibuprofen (ADVIL;MOTRIN) tablet 800 mg, 800 mg, Oral, Q8H PRN  cloNIDine (CATAPRES) tablet 0.1 mg, 0.1 mg, Oral, PRN  DULoxetine (CYMBALTA) extended release capsule 20 mg, 20 mg, Oral, Daily  ondansetron (ZOFRAN) tablet 4 mg, 4 mg, Oral, Q8H PRN  acetaminophen (TYLENOL) tablet 650 mg, 650 mg, Oral, Q6H PRN  hydrOXYzine (ATARAX) tablet 50 mg, 50 mg, Oral, TID PRN  traZODone (DESYREL) tablet 50 mg, 50 mg, Oral, Nightly PRN  polyethylene glycol (GLYCOLAX) packet 17 g, 17 g, Oral, Daily PRN  aluminum & magnesium hydroxide-simethicone (MAALOX) 200-200-20 MG/5ML suspension 30 mL, 30 mL, Oral, Q6H PRN  nicotine polacrilex (NICORETTE) gum 2 mg, 2 mg, Oral, Q2H PRN  chlorproMAZINE (THORAZINE) tablet 50 mg, 50 mg, Oral, TID PRN **OR** chlorproMAZINE (THORAZINE) injection 50 mg, 50 mg, IntraMUSCular, TID PRN    ASSESSMENT  Severe episode of recurrent major depressive disorder, without psychotic features ([facility]         HANDOFF  Patient symptoms are: Modestly Improving. Medications as determined by attending physician  Monitor need and frequency of PRN medications. Encourage participation in groups and milieu. Probable discharge is to be determined by MD.     Electronically signed by MASSIEL Aggarwal CNP on 4/7/2022 at 2:29 PM    **This report has been created using voice recognition software. It may contain minor errors which are inherent in voice recognition technology. **                                         Psychiatry Attending Attestation     I independently saw and evaluated the patient. I reviewed the Advance Practice Provider's documentation above. Any additional comments or changes to the Advance Practice Provider's documentation are stated below otherwise agree with assessment. Patient feels better than before. Mood and affect are better. Patient reports fleeting suicidal thoughts with no intent or plan. Patient notes that these thoughts are occurring less frequently. Denies any homicidal thoughts, that was explored with the patient. Oriented to time place and person. Recent and remote memory is intact. Patient feels hopeful. Sleep and appetite is good. No side effect from medication reported. Side-effect of medication were discussed with the patient . Patient is responding to current treatment. Discharge soon, if patient continues to show improvement.   Case discussed with the staff. Continues report dealing with significant withdrawal symptoms today. Discussed with him on trying Suboxone to help with withdrawal symptoms and is agreeable to the plan. PLAN  We will add Suboxone to help with severe withdrawal symptoms  Attempt to develop insight  Psycho-education conducted. Supportive Therapy conducted.   Probable discharge is tomorrow  Follow-up TBD    Electronically signed by Niki Sawyer MD on 4/7/22 at 2:51 PM EDT

## 2022-04-08 PROCEDURE — 96374 THER/PROPH/DIAG INJ IV PUSH: CPT

## 2022-04-08 PROCEDURE — 6370000000 HC RX 637 (ALT 250 FOR IP): Performed by: PSYCHIATRY & NEUROLOGY

## 2022-04-08 PROCEDURE — 99283 EMERGENCY DEPT VISIT LOW MDM: CPT

## 2022-04-08 PROCEDURE — 99239 HOSP IP/OBS DSCHRG MGMT >30: CPT | Performed by: PSYCHIATRY & NEUROLOGY

## 2022-04-08 PROCEDURE — 6360000002 HC RX W HCPCS: Performed by: PSYCHIATRY & NEUROLOGY

## 2022-04-08 PROCEDURE — 6370000000 HC RX 637 (ALT 250 FOR IP)

## 2022-04-08 RX ORDER — TRAZODONE HYDROCHLORIDE 50 MG/1
50 TABLET ORAL NIGHTLY PRN
Qty: 30 TABLET | Refills: 0 | Status: ON HOLD | OUTPATIENT
Start: 2022-04-08 | End: 2022-05-30 | Stop reason: HOSPADM

## 2022-04-08 RX ORDER — DULOXETIN HYDROCHLORIDE 20 MG/1
20 CAPSULE, DELAYED RELEASE ORAL DAILY
Qty: 30 CAPSULE | Refills: 0 | Status: ON HOLD | OUTPATIENT
Start: 2022-04-08 | End: 2022-05-30 | Stop reason: HOSPADM

## 2022-04-08 RX ADMIN — BUPRENORPHINE AND NALOXONE 1 FILM: 2; .5 FILM BUCCAL; SUBLINGUAL at 08:11

## 2022-04-08 RX ADMIN — NICOTINE POLACRILEX 2 MG: 2 GUM, CHEWING BUCCAL at 07:14

## 2022-04-08 RX ADMIN — DULOXETINE HYDROCHLORIDE 20 MG: 20 CAPSULE, DELAYED RELEASE ORAL at 08:11

## 2022-04-08 RX ADMIN — NICOTINE POLACRILEX 2 MG: 2 GUM, CHEWING BUCCAL at 08:36

## 2022-04-08 NOTE — BH NOTE
01/22/2014    HDL 72 (H) 08/28/2013     No components found for: LDLCAL  No results found for: Javad Lomax RN     Pt was picked up by [employer]. Pt was discharged to private residence in stable condition. Pt was educated on aftercare appointments and medications. Belongings were collected and returned to pt.

## 2022-04-08 NOTE — CARE COORDINATION
Name: Aniceto Martinez    : 1990    Discharge Date: 22    Primary Auth/Cert #: [id number]    Destination: Private residence    Discharge Medications:      Medication List      START taking these medications    DULoxetine 20 MG extended release capsule  Commonly known as: CYMBALTA  Take 1 capsule by mouth daily  Notes to patient: To help improve mood        CONTINUE taking these medications    traZODone 50 MG tablet  Commonly known as: DESYREL  Take 1 tablet by mouth nightly as needed for Sleep  Notes to patient:  To help improve sleep        STOP taking these medications    ALPRAZolam 0.5 MG tablet  Commonly known as: XANAX     azithromycin 250 MG tablet  Commonly known as: ZITHROMAX     gabapentin 800 MG tablet  Commonly known as: Neurontin     meloxicam 7.5 MG tablet  Commonly known as: Mobic     metFORMIN 500 MG tablet  Commonly known as: GLUCOPHAGE     OLANZapine 15 MG tablet  Commonly known as: ZYPREXA     ondansetron 4 MG disintegrating tablet  Commonly known as: Zofran ODT     pantoprazole 40 MG tablet  Commonly known as: PROTONIX     promethazine 12.5 MG tablet  Commonly known as: PHENERGAN     promethazine 25 MG tablet  Commonly known as: PHENERGAN           Where to Get Your Medications      These medications were sent to [facility] [address] [address]    Phone: [phone]   · DULoxetine 20 MG extended release capsule  · traZODone 50 MG tablet         Follow Up Appointment: Marcy Polo [address]  [address] [facility] [phone]  On 2022  You have an appointment scheduled at 9:30 AM

## 2022-04-08 NOTE — CARE COORDINATION
Discharge Arrangements:  Patient has been scheduled for follow up with Liz. He is planning to walk in to [place] today after arriving downtown to follow up with suboxone as physician is NOT prescribing any for discharge and [facility] is not able to schedule him for MAT until 4/19. Note patient did NOT start suboxone until 4/7.     Guardian notified: n/a    Discharge destination/address: [address], [address] [place] [address]    Transported by:  [employer] [facility] [facility]

## 2022-04-08 NOTE — BH NOTE
Patient is complaining of anxiety at this time. Stating that they feel restless and is having trouble calming down in order to rest this evening. Atarax 50 mg was given as prescribed for increased anxiety.

## 2022-04-08 NOTE — BH NOTE
Patient given tobacco quitline number 18583657556 at this time, refusing to call at this time, states \" I just dont want to quit now\"- patient given information as to the dangers of long term tobacco use. Continue to reinforce the importance of tobacco cessation.

## 2022-04-08 NOTE — PROGRESS NOTES
 Substance abuse ([facility]     Hx of opiates, benzos, cocaine, alcohol abuse    Suicidal ideation     [facility]     Tobacco dependence     Ulcerative colitis ([facility]     UTI (urinary tract infection)         Past Surgical History:     Past Surgical History:   Procedure Laterality Date    ABDOMEN SURGERY      upper GI scope 7/7/2015    BRONCHOSCOPY      CHOLECYSTECTOMY, LAPAROSCOPIC  07/14/2017    surgery performed at [address] [place] [address], COLON, DIAGNOSTIC      OTHER SURGICAL HISTORY      lumbar puncture    RI COLONOSCOPY W/BIOPSY SINGLE/MULTIPLE  8/9/2017    COLONOSCOPY WITH BIOPSY performed by Jessiac Marsh MD at [facility]    RI EGD TRANSORAL BIOPSY SINGLE/MULTIPLE N/A 3/20/2017    EGD BIOPSY performed by Karen Hewitt MD at [facility]    RI ESOPHAGOGASTRODUODENOSCOPY TRANSORAL DIAGNOSTIC N/A 8/9/2017    EGD ESOPHAGOGASTRODUODENOSCOPY performed by Jessica Marsh MD at [address] N/A 3/20/2017    SIGMOIDOSCOPY DIAGNOSTIC FLEXIBLE performed by Karen Hewitt MD at [facility]  2/4/16    UPPER GASTROINTESTINAL ENDOSCOPY N/A 6/13/2018    GASTRITIS    UPPER GASTROINTESTINAL ENDOSCOPY N/A 9/20/2018    EGD BIOPSY performed by Zachary Winter MD at [address]        Medications Prior to Admission:     Prior to Admission medications    Medication Sig Start Date End Date Taking? Authorizing Provider   OLANZapine (ZYPREXA) 15 MG tablet Take 1 tablet by mouth nightly 1/10/22   Alena Goldberg MD   metFORMIN (GLUCOPHAGE) 500 MG tablet Take 1 tablet by mouth 2 times daily (with meals) 1/10/22   Alena Goldberg MD   gabapentin (NEURONTIN) 800 MG tablet Take 1 tablet by mouth 4 times daily for 7 days. Patient taking differently: Take 800 mg by mouth 4 times daily.  Last dose on 4/3/22 1/3/22 1/14/22  MASSIEL Gomez - CNP   traZODone (DESYREL) 50 MG tablet Take 1 tablet by mouth nightly as needed for Sleep 1/3/22   MASSIEL Jewell CNP   meloxicam [facility]) 7.5 MG tablet Take 1 tablet by mouth 2 times daily as needed for Pain 8/19/21 10/30/21  MASSIEL Jewell CNP        Allergies:     Dicyclomine, Famotidine, Geodon [ziprasidone hcl], Haloperidol, Iv dye [iodides], Reglan [metoclopramide], Ibuprofen, Aspirin, Dicyclomine hcl, Hydroxyzine hcl, Iodine, Metronidazole, Mirtazapine, Promethazine, and Ziprasidone    Social History:     Tobacco:    reports that he has been smoking cigarettes. He has a 7.50 pack-year smoking history. He has never used smokeless tobacco.  Alcohol:      reports current alcohol use. Drug Use:  reports previous drug use. Drugs: Opiates  and Cocaine. Family History:     Family History   Problem Relation Age of Onset    Diabetes Father     Alcohol Abuse Father     Depression Father     Arthritis Father     High Blood Pressure Father     Other Father         aneurysm & epilepsy    Migraines Father     Arthritis Mother     Other Mother         aneurysm & epilepsy    Migraines Mother     Diabetes Brother         Aunt and uncles    Depression Brother     Mental Illness Brother     Other Brother         epilepsy    Migraines Brother     Stroke Other         Uncle    Other Brother         murdered Oct 6th, 2014    Colon Cancer Paternal Cousin 43    Other Sister         epilepsy    Migraines Sister        Review of Systems:     Positive and Negative as described in HPI. CONSTITUTIONAL:  negative for fevers, chills, sweats, fatigue, weight loss  HEENT:  negative for vision, hearing changes, runny nose, throat pain  RESPIRATORY:  negative for shortness of breath, cough, congestion, wheezing. CARDIOVASCULAR:  negative for chest pain, palpitations.   GASTROINTESTINAL:  negative for nausea, vomiting, diarrhea, constipation, change in bowel habits, abdominal pain   GENITOURINARY:  negative for difficulty of urination, burning with urination, frequency   INTEGUMENT:  negative for rash, skin lesions, easy bruising   HEMATOLOGIC/LYMPHATIC:  negative for swelling/edema   ALLERGIC/IMMUNOLOGIC:  negative for urticaria , itching  ENDOCRINE:  negative increase in drinking, increase in urination, hot or cold intolerance  MUSCULOSKELETAL:  negative joint pains, muscle aches, swelling of joints  NEUROLOGICAL:  negative for headaches, dizziness, lightheadedness, numbness, pain, tingling extremities      Physical Exam:     BP (!) 145/69   Pulse 83   Temp 97.5 °F (36.4 °C) (Oral)   Resp 14   Ht 5' 9\" (1.753 m)   Wt 220 lb (99.8 kg)   SpO2 100%   BMI 32.49 kg/m²   No data recorded. No results for input(s): POCGLU in the last 72 hours. No intake or output data in the 24 hours ending 04/07/22 2330    General Appearance:  alert, well appearing, and in no acute distress  Mental status: oriented to person, place, and time with normal affect  Head:  normocephalic, atraumatic. Eye: no icterus, redness, pupils equal and reactive, extraocular eye movements intact, conjunctiva clear  Ear: normal external ear, no discharge, hearing intact  Nose:  no drainage noted  Mouth: mucous membranes moist  Neck: supple, no carotid bruits, thyroid not palpable  Lungs: Bilateral equal air entry, clear to ausculation, no wheezing, rales or rhonchi, normal effort  Cardiovascular: normal rate, regular rhythm, no murmur, gallop, rub.   Abdomen: Soft, nontender, nondistended, normal bowel sounds, no hepatomegaly or splenomegaly  Neurologic: There are no new focal motor or sensory deficits, normal muscle tone and bulk, no abnormal sensation, normal speech, cranial nerves II through XII grossly intact  Skin: No gross lesions, rashes, bruising or bleeding on exposed skin area  Extremities:  peripheral pulses palpable, no pedal edema or calf pain with palpation  Psych: normal affect    Investigations:      Laboratory Testing:  No results found for this or any previous visit (from the past 24 hour(s)). Imaging/Diagonstics:  No results found. Assessment :      Hospital Problems           Last Modified POA    * (Principal) Severe episode of recurrent major depressive disorder, without psychotic features ([facility] 4/5/2022 Yes    SRIRAM (obstructive sleep apnea) 4/6/2022 Yes    Primary insomnia 4/6/2022 Yes    Gastritis 4/6/2022 Yes    Schizoaffective disorder, bipolar type ([facility] (Chronic) 4/6/2022 Yes    Diabetes mellitus type 2 in obese ([facility] 4/6/2022 Yes    Mixed hyperlipidemia 4/6/2022 Yes          Plan:     1. 31-year gentleman admitted to inpatient psych for major depression and suicidal ideations,  2. Obstructive sleep apnea, possibly will bring the home CPAP, he may be noncompliant,  3. Primary insomnia, medication per psych,  4. Gastritis, PPI,  5. Diabetes mellitus, continue Metformin, check A1c,  6. Mixed hyperlipidemia, check lipid profile,  7. Obesity, low-calorie diet,  8. DVT prophylaxis,  9. Full CODE STATUS    4/7  Obstructive sleep apnea, did not bring the home CPAP,  Primary insomnia, will give Tylenol PM,  Patient has itching all over, start Benadryl every 8 hours,  Labs, radiology, medications reviewed,      Consultations:   Trace Us MD  4/7/2022  11:30 PM    Copy sent to Dr. Abhijit Kenyon Provider    Please note that this chart was generated using voice recognition Dragon dictation software. Although every effort was made to ensure the accuracy of this automated transcription, some errors in transcription may have occurred.

## 2022-04-08 NOTE — BH NOTE
As needed medication follow-up note:    As needed medication of Atarax 50 mg po was effective as evidence by patient stating they are feeling better and able to relax in bed. Patient denies medication side effects. Will continue to monitor and provide support as needed.

## 2022-04-08 NOTE — DISCHARGE INSTR - DIET

## 2022-04-08 NOTE — BH NOTE
Pt was given AVS and educated on aftercare appointments. Pt refused his AVS, stating he knows when his appointment is and repeated the appointment date and time.

## 2022-04-08 NOTE — PLAN OF CARE
Problem: Altered Mood, Depressive Behavior:  Goal: Absence of self-harm  Description: Absence of self-harm  4/7/2022 2202 by Timothy Avitia RN  Outcome: Ongoing     Problem: Anger Management/Homicidal Ideation:  Goal: Able to display appropriate communication and problem solving  Description: Able to display appropriate communication and problem solving  4/7/2022 2202 by Timothy Avitia RN  Outcome: Ongoing     Patient was able to display appropriate communication with staff during this shift. Patient remains free from self harm. Safety checks maintained q 15 min. Pt denies HI/SI.

## 2022-04-09 ENCOUNTER — HOSPITAL ENCOUNTER (EMERGENCY)
Age: 32
Discharge: HOME | End: 2022-04-09
Attending: EMERGENCY MEDICINE
Payer: COMMERCIAL

## 2022-04-09 VITALS
WEIGHT: 220 LBS | DIASTOLIC BLOOD PRESSURE: 87 MMHG | BODY MASS INDEX: 32.58 KG/M2 | RESPIRATION RATE: 20 BRPM | HEART RATE: 90 BPM | OXYGEN SATURATION: 95 % | TEMPERATURE: 98 F | SYSTOLIC BLOOD PRESSURE: 130 MMHG | HEIGHT: 69 IN

## 2022-04-09 DIAGNOSIS — F41.1 ANXIETY STATE: Primary | ICD-10-CM

## 2022-04-09 PROCEDURE — 6360000002 HC RX W HCPCS: Performed by: EMERGENCY MEDICINE

## 2022-04-09 PROCEDURE — 6370000000 HC RX 637 (ALT 250 FOR IP): Performed by: EMERGENCY MEDICINE

## 2022-04-09 RX ORDER — ALPRAZOLAM 0.5 MG/1
0.5 TABLET ORAL ONCE
Status: COMPLETED | OUTPATIENT
Start: 2022-04-09 | End: 2022-04-09

## 2022-04-09 RX ORDER — DIPHENHYDRAMINE HYDROCHLORIDE 50 MG/ML
25 INJECTION INTRAMUSCULAR; INTRAVENOUS ONCE
Status: COMPLETED | OUTPATIENT
Start: 2022-04-09 | End: 2022-04-09

## 2022-04-09 RX ORDER — DIPHENHYDRAMINE HCL 25 MG
25 CAPSULE ORAL EVERY 6 HOURS PRN
Qty: 20 CAPSULE | Refills: 0 | Status: SHIPPED | OUTPATIENT
Start: 2022-04-09 | End: 2022-04-14

## 2022-04-09 RX ORDER — ALPRAZOLAM 0.5 MG/1
0.5 TABLET ORAL 2 TIMES DAILY PRN
Qty: 4 TABLET | Refills: 0 | Status: SHIPPED | OUTPATIENT
Start: 2022-04-09 | End: 2022-04-11

## 2022-04-09 RX ADMIN — DIPHENHYDRAMINE HYDROCHLORIDE 25 MG: 50 INJECTION, SOLUTION INTRAMUSCULAR; INTRAVENOUS at 01:24

## 2022-04-09 RX ADMIN — ALPRAZOLAM 0.5 MG: 0.5 TABLET ORAL at 01:25

## 2022-04-09 ASSESSMENT — ENCOUNTER SYMPTOMS
NAUSEA: 0
VOMITING: 0
SHORTNESS OF BREATH: 0

## 2022-04-09 NOTE — ED NOTES
Pt came to ED requesting xanax and benadryl IM. Pt stated he was recently d/c from psych and doesn't have follow up and needs a refill for his prescription. Pt stated his anxiety is increasing.       Neela Mayer RN  04/09/22 8858

## 2022-04-09 NOTE — ED PROVIDER NOTES
[facility]  Emergency Department Encounter     Pt Name: Gonzalo Jacobs  MRN: [id number]  [place] 1990  Date of evaluation: 4/9/22  PCP:  None Provider    [address] [place] [address]       Chief Complaint   Patient presents with    Anxiety     anxiety increasing today, ran out of xanax prescription       HISTORY OF PRESENT ILLNESS  (Location/Symptom, Timing/Onset, Context/Setting, Quality, Duration, Modifying Factors, Severity.)    Gonzalo Jacobs is a 32 y.o. male who presents with anxiety. Patient states that he just got the hospital recently and does not have follow-up with his primary care doctor until next week. He states that he takes Xanax twice a day and just ran out he was hoping to get a refill to hold him over for the next couple of days. No suicidal or homicidal ideation. Has not had any chest pain or shortness of breath. Does have a history of depression, schizophrenia, has been admitted inpatient psychiatric before. Denies any hallucinations. Requesting a Benadryl shot.   PAST MEDICAL / SURGICAL / SOCIAL / FAMILY HISTORY    has a past medical history of Anxiety, Arthritis, Asthma, Bipolar I disorder, most recent episode (or current) depressed, unspecified, Clostridium difficile infection, COPD (chronic obstructive pulmonary disease) ([address] Depression, Disease of blood and blood forming organ, Eczema, Fracture, metacarpal, Gastric ulcer, Gastritis, GERD (gastroesophageal reflux disease), GI bleed, H. pylori infection, H/O blood clots, Head injury, Headache, Insomnia, Juvenile rheumatoid arthritis ([address] Neuromuscular disorder ([address] PFAPA syndrome ([address] PUD (peptic ulcer disease), Rheumatoid arthritis ([address] Rheumatoid arthritis(714.0), Severe recurrent major depression without psychotic features ([address] Sleep apnea, 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[address] Substance abuse ([address] Suicidal ideation, Suicide attempt by hanging ([address] Tobacco dependence, Ulcerative colitis ([address] and UTI (urinary tract infection). has a past surgical history that includes Colonoscopy; bronchoscopy; other surgical history; Upper gastrointestinal endoscopy (2/4/16); pr egd transoral biopsy single/multiple (N/A, 3/20/2017); sigmoidoscopy (N/A, 3/20/2017); Cholecystectomy, laparoscopic (07/14/2017); pr esophagogastroduodenoscopy transoral diagnostic (N/A, 8/9/2017); pr colonoscopy w/biopsy single/multiple (8/9/2017); Abdomen surgery; Upper gastrointestinal endoscopy (N/A, 6/13/2018); Upper gastrointestinal endoscopy (N/A, 9/20/2018); and Endoscopy, colon, diagnostic. Social History     Socioeconomic History    Marital status: Single     Spouse name: Not on file    Number of children: Not on file    Years of education: Not on file    Highest education level: Not on file   Occupational History    Occupation: disability   Tobacco Use    Smoking status: Current Every Day Smoker     Packs/day: 0.50     Years: 15.00     Pack years: 7.50     Types: Cigarettes    Smokeless tobacco: Never Used   Vaping Use    Vaping Use: Former   Substance and Sexual Activity    Alcohol use: Yes     Comment: drinks daily    Drug use: Not Currently     Types: Opiates , Cocaine     Comment: Hx of opiates, benzos, cocaine, alcohol abuse    Sexual activity: Yes     Partners: Female     Comment: Lives alone, not working. Other Topics Concern    Not on file   Social History Narrative    ** Merged History Encounter **          Social Determinants of Health     Financial Resource Strain: Medium Risk    Difficulty of Paying Living Expenses: Somewhat hard   Food Insecurity: No Food Insecurity    Worried About Running Out of Food in the Last Year: Never true    Micaela of Food in the Last Year: Never true   Transportation Needs: No Transportation Needs    Lack of Transportation (Medical): No    Lack of Transportation (Non-Medical):  No   Physical Activity: Inactive    Days of Exercise per Week: 0 days    Minutes of Exercise per Session: 0 min   Stress: Stress Concern Present    Feeling of Stress : Rather much   Social Connections: Socially Isolated    Frequency of Communication with Friends and Family: Never    Frequency of Social Gatherings with Friends and Family: Never    Attends Scientology Services: Never    Active Member of Clubs or Organizations: No    Attends Club or Organization Meetings: Never    Marital Status: Never    Intimate Partner Violence: Not At Risk    Fear of Current or Ex-Partner: No    Emotionally Abused: No    Physically Abused: No    Sexually Abused: No   Housing Stability: [address] Unable to Pay for Housing in the Last Year: No    Number of [place] in the Last Year: 1    Unstable Housing in the Last Year: No       Family History   Problem Relation Age of Onset    Diabetes Father     Alcohol Abuse Father     Depression Father     Arthritis Father     High Blood Pressure Father     Other Father         aneurysm & epilepsy    Migraines Father     Arthritis Mother     Other Mother         aneurysm & epilepsy    Migraines Mother     Diabetes Brother         Aunt and uncles    Depression Brother     Mental Illness Brother     Other Brother         epilepsy    Migraines Brother     Stroke Other         Uncle    Other Brother         murdered Oct 6th, 2014    Colon Cancer Paternal Cousin 43    Other Sister         epilepsy   [facility] Migraines Sister        Allergies:    Dicyclomine, Famotidine, Geodon [ziprasidone hcl], Haloperidol, Iv dye [iodides], Reglan [metoclopramide], Ibuprofen, Aspirin, Dicyclomine hcl, Hydroxyzine hcl, Iodine, Metronidazole, Mirtazapine, Promethazine, and Ziprasidone    Home Medications:  Prior to Admission medications    Medication Sig Start Date End Date Taking? Authorizing Provider   ALPRAZolam Jenkaren Gram) 0.5 MG tablet Take 1 tablet by mouth 2 times daily as needed for Sleep for up to 2 days.  4/9/22 4/11/22 Yes Chanelle Monterroso, DO diphenhydrAMINE (BENADRYL) 25 MG capsule Take 1 capsule by mouth every 6 hours as needed (anxiety) 4/9/22 4/14/22 Yes Azeb Sanon DO   traZODone (DESYREL) 50 MG tablet Take 1 tablet by mouth nightly as needed for Sleep 4/8/22   Pedro Martel MD   DULoxetine (CYMBALTA) 20 MG extended release capsule Take 1 capsule by mouth daily 4/8/22   Pedro Martel MD   meloxicam (MOBIC) 7.5 MG tablet Take 1 tablet by mouth 2 times daily as needed for Pain 8/19/21 10/30/21  MASSIEL Mcallister - CNP       REVIEW OF SYSTEMS    (2-9 systems for level 4, 10 or more for level 5)    Review of Systems   Respiratory: Negative for shortness of breath. Cardiovascular: Negative for chest pain. Gastrointestinal: Negative for nausea and vomiting. Neurological: Negative for dizziness and light-headedness. Psychiatric/Behavioral: Negative for self-injury. The patient is nervous/anxious. PHYSICAL EXAM   (up to 7 for level 4, 8 or more for level 5)    VITALS:   Vitals:    04/09/22 0003   BP: 130/87   Pulse: 90   Resp: 20   Temp: 98 °F (36.7 °C)   SpO2: 95%   Weight: 220 lb (99.8 kg)   Height: 5' 9\" (1.753 m)       Physical Exam  Vitals and nursing note reviewed. Constitutional:       General: He is not in acute distress. Appearance: He is well-developed. He is not diaphoretic. HENT:      Head: Normocephalic and atraumatic. Eyes:      Conjunctiva/sclera: Conjunctivae normal.   Cardiovascular:      Rate and Rhythm: Normal rate and regular rhythm. Pulmonary:      Effort: Pulmonary effort is normal. No respiratory distress. Breath sounds: Normal breath sounds. Musculoskeletal:         General: Normal range of motion. Cervical back: Normal range of motion and neck supple. Skin:     General: Skin is warm and dry. Neurological:      General: No focal deficit present. Mental Status: He is alert. Psychiatric:         Mood and Affect: Affect is flat.          Speech: Speech normal.         Behavior: Behavior normal.         Thought Content: Thought content does not include homicidal or suicidal ideation. DIFFERENTIAL  DIAGNOSIS   PLAN (LABS / IMAGING / EKG):  No orders of the defined types were placed in this encounter. MEDICATIONS ORDERED:  Orders Placed This Encounter   Medications    diphenhydrAMINE (BENADRYL) injection 25 mg    ALPRAZolam (XANAX) tablet 0.5 mg    ALPRAZolam (XANAX) 0.5 MG tablet     Sig: Take 1 tablet by mouth 2 times daily as needed for Sleep for up to 2 days. Dispense:  4 tablet     Refill:  0    diphenhydrAMINE (BENADRYL) 25 MG capsule     Sig: Take 1 capsule by mouth every 6 hours as needed (anxiety)     Dispense:  20 capsule     Refill:  0       DIAGNOSTIC RESULTS / EMERGENCYDEPARTMENT COURSE / MDM   LABS:  Labs Reviewed - No data to display    RADIOLOGY:  No results found. EMERGENCY DEPARTMENT COURSE:       MDM  Number of Diagnoses or Management Options  Anxiety state  Diagnosis management comments: Patient just seen on 4/3 for similar complaints and was given 2 days worth of Xanax at this time. He states that he follows with his primary care provider but not a psychiatrist.  We discussed this being important. He is also aware that he will likely not able to get any more refills of Xanax out of the emergency department. He has a high overdose risk due to being on Suboxone and Xanax with multiple prescriptions from different providers before. Not acutely suicidal homicidal or having any hallucinations on my evaluation. Vitals stable. No acute distress. Amount and/or Complexity of Data Reviewed  Review and summarize past medical records: yes    Patient Progress  Patient progress: stable      PROCEDURES:  Procedures     CONSULTS:  None    CRITICAL CARE:  none    FINAL IMPRESSION     1.  Anxiety state       DISPOSITION / PLAN   DISPOSITION Decision To Discharge 04/09/2022 12:59:28 AM      Evaluation and treatment course in the ED, and plan of care upon discharge was discussed in length with the patient. Patient had no further questions prior to being discharged and was instructed to return to the ED for new or worsening symptoms. Any changes to existing medications or new prescriptions were reviewed with patient and they expressed understanding of how to correctly take their medications and the possible side effects. PATIENT REFERRED TO:  [facility] ED  [address] [facility]  [phone]    As needed, If symptoms worsen      DISCHARGE MEDICATIONS:  New Prescriptions    ALPRAZOLAM (XANAX) 0.5 MG TABLET    Take 1 tablet by mouth 2 times daily as needed for Sleep for up to 2 days. DIPHENHYDRAMINE (BENADRYL) 25 MG CAPSULE    Take 1 capsule by mouth every 6 hours as needed (anxiety)       Bibi Larios DO  Emergency Medicine Physician    (Please note that portions of this note were completed with a voice recognition program.  Efforts were made to edit the dictations but occasionally words are mis-transcribed.)        Nuria Patel DO  04/09/22 0111

## 2022-04-11 NOTE — ED PROVIDER NOTES
eMERGENCY dEPARTMENT eNCOUnter   Independent Attestation     Pt Name: Kareem Byers  MRN: [id number]  [place] 1990  Date of evaluation: 4/11/22     Kareem Byers is a 32 y.o. male with CC: Anxiety (wants something to calm down, started a few days ago )        This visit was performed by both a physician and an APC. I performed all aspects of the MDM as documented. The care is provided during an unprecedented national emergency due to the novel coronavirus, COVID 19.     Nalini Cervantes MD  Attending Emergency Physician            Nalini Cervantes MD  04/11/22 4602

## 2022-04-12 ENCOUNTER — APPOINTMENT (OUTPATIENT)
Dept: GENERAL RADIOLOGY | Age: 32
End: 2022-04-12
Payer: COMMERCIAL

## 2022-04-12 ENCOUNTER — HOSPITAL ENCOUNTER (EMERGENCY)
Age: 32
Discharge: HOME | End: 2022-04-12
Attending: EMERGENCY MEDICINE
Payer: COMMERCIAL

## 2022-04-12 VITALS
WEIGHT: 220 LBS | DIASTOLIC BLOOD PRESSURE: 91 MMHG | OXYGEN SATURATION: 99 % | HEART RATE: 88 BPM | SYSTOLIC BLOOD PRESSURE: 140 MMHG | HEIGHT: 69 IN | RESPIRATION RATE: 16 BRPM | BODY MASS INDEX: 32.58 KG/M2 | TEMPERATURE: 99 F

## 2022-04-12 VITALS
HEART RATE: 85 BPM | OXYGEN SATURATION: 95 % | TEMPERATURE: 97.9 F | RESPIRATION RATE: 16 BRPM | SYSTOLIC BLOOD PRESSURE: 128 MMHG | DIASTOLIC BLOOD PRESSURE: 73 MMHG

## 2022-04-12 DIAGNOSIS — Z53.21 ELOPED FROM EMERGENCY DEPARTMENT: ICD-10-CM
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
## 2022-04-12 PROCEDURE — 6360000002 HC RX W HCPCS: Performed by: STUDENT IN AN ORGANIZED HEALTH CARE EDUCATION/TRAINING PROGRAM

## 2022-04-12 PROCEDURE — 6370000000 HC RX 637 (ALT 250 FOR IP): Performed by: EMERGENCY MEDICINE

## 2022-04-12 PROCEDURE — 6360000002 HC RX W HCPCS: Performed by: EMERGENCY MEDICINE

## 2022-04-12 PROCEDURE — 99283 EMERGENCY DEPT VISIT LOW MDM: CPT

## 2022-04-12 PROCEDURE — 90715 TDAP VACCINE 7 YRS/> IM: CPT | Performed by: STUDENT IN AN ORGANIZED HEALTH CARE EDUCATION/TRAINING PROGRAM

## 2022-04-12 PROCEDURE — 90471 IMMUNIZATION ADMIN: CPT | Performed by: STUDENT IN AN ORGANIZED HEALTH CARE EDUCATION/TRAINING PROGRAM

## 2022-04-12 PROCEDURE — 96372 THER/PROPH/DIAG INJ SC/IM: CPT

## 2022-04-12 PROCEDURE — 6370000000 HC RX 637 (ALT 250 FOR IP): Performed by: STUDENT IN AN ORGANIZED HEALTH CARE EDUCATION/TRAINING PROGRAM

## 2022-04-12 RX ORDER — DIPHENHYDRAMINE HYDROCHLORIDE 50 MG/ML
12.5 INJECTION INTRAMUSCULAR; INTRAVENOUS ONCE
Status: COMPLETED | OUTPATIENT
Start: 2022-04-12 | End: 2022-04-12

## 2022-04-12 RX ORDER — AMOXICILLIN AND CLAVULANATE POTASSIUM 875; 125 MG/1; MG/1
1 TABLET, FILM COATED ORAL EVERY 12 HOURS SCHEDULED
Status: DISCONTINUED | OUTPATIENT
Start: 2022-04-12 | End: 2022-04-12 | Stop reason: HOSPADM

## 2022-04-12 RX ORDER — LIDOCAINE HYDROCHLORIDE 20 MG/ML
JELLY TOPICAL ONCE
Status: COMPLETED | OUTPATIENT
Start: 2022-04-12 | End: 2022-04-12

## 2022-04-12 RX ORDER — ACETAMINOPHEN 500 MG
1000 TABLET ORAL ONCE
Status: DISCONTINUED | OUTPATIENT
Start: 2022-04-12 | End: 2022-04-12 | Stop reason: HOSPADM

## 2022-04-12 RX ORDER — DIPHENHYDRAMINE HYDROCHLORIDE 50 MG/ML
25 INJECTION INTRAMUSCULAR; INTRAVENOUS EVERY 6 HOURS PRN
Status: DISCONTINUED | OUTPATIENT
Start: 2022-04-12 | End: 2022-04-13 | Stop reason: HOSPADM

## 2022-04-12 RX ORDER — ACETAMINOPHEN 500 MG
1000 TABLET ORAL ONCE
Status: COMPLETED | OUTPATIENT
Start: 2022-04-12 | End: 2022-04-12

## 2022-04-12 RX ORDER — DIPHENHYDRAMINE HCL 25 MG
25 TABLET ORAL ONCE
Status: DISCONTINUED | OUTPATIENT
Start: 2022-04-12 | End: 2022-04-12

## 2022-04-12 RX ADMIN — DIPHENHYDRAMINE HYDROCHLORIDE 25 MG: 50 INJECTION, SOLUTION INTRAMUSCULAR; INTRAVENOUS at 23:13

## 2022-04-12 RX ADMIN — ACETAMINOPHEN 1000 MG: 500 TABLET ORAL at 23:13

## 2022-04-12 RX ADMIN — TETANUS TOXOID, REDUCED DIPHTHERIA TOXOID AND ACELLULAR PERTUSSIS VACCINE, ADSORBED 0.5 ML: 5; 2.5; 8; 8; 2.5 SUSPENSION INTRAMUSCULAR at 11:17

## 2022-04-12 RX ADMIN — LIDOCAINE HYDROCHLORIDE: 20 JELLY TOPICAL at 11:23

## 2022-04-12 RX ADMIN — DIPHENHYDRAMINE HYDROCHLORIDE 12.5 MG: 50 INJECTION, SOLUTION INTRAMUSCULAR; INTRAVENOUS at 11:18

## 2022-04-12 RX ADMIN — AMOXICILLIN AND CLAVULANATE POTASSIUM 1 TABLET: 875; 125 TABLET, FILM COATED ORAL at 11:15

## 2022-04-12 ASSESSMENT — PAIN SCALES - GENERAL
PAINLEVEL_OUTOF10: 9
PAINLEVEL_OUTOF10: 10

## 2022-04-12 NOTE — ED NOTES
Pt presents to the ED c/o multiple dog bites obtained 30 min ago. Pt states he walking on [place] and was attacked by a dog. Pt has a bite to his right knee and left calf, bleeding is controlled. Pt states he did fall when the dog bit him but denies any head trauma or LOC. Pt A&O x 4, does not appear in acute distress, RR even and unlabored, resting comfortably on stretcher with eyes open and call light in reach. Pt placed in a gown for exam, warm blankets provided, vital signs obtained and medical hx and allergies reviewed with pt. Dr. Luna Reap at bedside to assess pt.  Initial assessment performed by physician, [facility] will carry out initial orders/tasks and reassess pt.            Hallie Lazo LPN  49/26/40 9134

## 2022-04-12 NOTE — ED PROVIDER NOTES
[facility]     Emergency Department     Faculty Attestation    I performed a history and physical examination of the patient and discussed management with the resident. I have reviewed and agree with the residents findings including all diagnostic interpretations, and treatment plans as written. Any areas of disagreement are noted on the chart. I was personally present for the key portions of any procedures. I have documented in the chart those procedures where I was not present during the key portions. I have reviewed the emergency nurses triage note. I agree with the chief complaint, past medical history, past surgical history, allergies, medications, social and family history as documented unless otherwise noted below. Documentation of the HPI, Physical Exam and Medical Decision Making performed by marlo is based on my personal performance of the HPI, PE and MDM. For Physician Assistant/ Nurse Practitioner cases/documentation I have personally evaluated this patient and have completed at least one if not all key elements of the E/M (history, physical exam, and MDM). Additional findings are as noted. Patient with superficial dog bite just inferior to the right popliteal fossa, with skin abrasions, that will not require any suture repair. Will need wound irrigation. Patient unknown when last tetanus was updated. We will plan to update today. Just below his left knee he has additional superficial lacerations and a 1.5 cm V-shaped laceration that does have some opening this appears just lateral to the tibial tuberosity. Patient is uncomfortable. And has pain with palpation around this area there is some ecchymosis noted. Patient is able to flex and extend at his knee fully, without decreased range of motion but does have pain with movement, he ambulated to the ER without assistance.     We will plan on update tetanus, x-ray imaging due to his pain at the left knee. Low concern for any joint involvement. But will plan on extensive wound irrigation and exploration of open wound, laceration repair, and pain control. Patient states he cannot take Tylenol and he cannot take NSAIDs. FYis in the chare reviewed.     Rickey Hobbs D.O, M.P.H  Attending Emergency Medicine Physician         Rickey Hobbs DO  04/12/22 1133

## 2022-04-12 NOTE — ED NOTES
Pt eloped stating that he is refusing care and does not \"want stitches or xrays, cause it hurts too bad\". Dr. Allyson Fatima and Dr. Elijah Capone attempted to encourage pt to stay for wound care, pt refused and walked out.      Lana Mcfarland, LPN  77/76/90 2542

## 2022-04-12 NOTE — ED NOTES
Pt declined medications, stating \" I don't take tylenol and I want a benedryl shot for my anxiety\".  Dr. Josiane Okeefe informed of pt's request.     Nadia Melendez LPN  31/27/12 4694

## 2022-04-12 NOTE — ED PROVIDER NOTES
[facility]  ED  Emergency Department Encounter  Emergency Medicine Resident     Pt Name: Rudolph Felipe  MRN: [id number]  Darc[place]fmohinder 1990  Date of evaluation: 4/12/22  PCP:  None Provider    This patient was evaluated in the Emergency Department for symptoms described in the history of present illness. The patient was evaluated in the context of the global COVID-19 pandemic, which necessitated consideration that the patient might be at risk for infection with the SARS-CoV-2 virus that causes COVID-19. Institutional protocols and algorithms that pertain to the evaluation of patients at risk for COVID-19 are in a state of rapid change based on information released by regulatory bodies including the CDC and federal and state organizations. These policies and algorithms were followed during the patient's care in the ED. CHIEF COMPLAINT       Chief Complaint   Patient presents with    Animal Bite     HISTORY OFPRESENT ILLNESS  (Location/Symptom, Timing/Onset, Context/Setting, Quality, Duration, Modifying Factors,Severity.)      Rudolph Felipe is a 32 y.o. male who presents with dog bite to anterior left lower extremity just below the knee and to posterior calf on right lower leg. This happened just prior to arrival.  Patient states that the dog was a pit bull or Rottweiler and did not have any collar on it. He states that the police shot the dog. He states that he did fall after being bit but did not hit his head. No syncope.   No pain medication taken prior to arrival.    PAST MEDICAL / SURGICAL / SOCIAL / FAMILY HISTORY      has a past medical history of Anxiety, Arthritis, Asthma, Bipolar I disorder, most recent episode (or current) depressed, unspecified, Clostridium difficile infection, COPD (chronic obstructive pulmonary disease) ([facility] [address] Depression, Disease of blood and blood forming organ, Eczema, Fracture, metacarpal, Gastric ulcer, Gastritis, GERD (gastroesophageal reflux disease), GI bleed, H. pylori infection, H/O blood clots, Head injury, Headache, Insomnia, Juvenile rheumatoid arthritis ([facility] [address] Neuromuscular disorder (HCC), PFAPA syndrome ([facility] [address] PUD (peptic ulcer disease), Rheumatoid arthritis ([facility] [address] Rheumatoid arthritis(714.0), Severe recurrent major depression without psychotic features ([facility] [address] Sleep apnea, 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[facility] [address] Substance abuse ([facility] [address] Suicidal ideation, Suicide attempt by hanging ([facility] [address] Tobacco dependence, Ulcerative colitis ([facility] [address] and UTI (urinary tract infection). has a past surgical history that includes Colonoscopy; bronchoscopy; other surgical history; Upper gastrointestinal endoscopy (2/4/16); pr egd transoral biopsy single/multiple (N/A, 3/20/2017); sigmoidoscopy (N/A, 3/20/2017); Cholecystectomy, laparoscopic (07/14/2017); pr esophagogastroduodenoscopy transoral diagnostic (N/A, 8/9/2017); pr colonoscopy w/biopsy single/multiple (8/9/2017); Abdomen surgery; Upper gastrointestinal endoscopy (N/A, 6/13/2018); Upper gastrointestinal endoscopy (N/A, 9/20/2018); and Endoscopy, colon, diagnostic. Social History     Socioeconomic History    Marital status: Single     Spouse name: Not on file    Number of children: Not on file    Years of education: Not on file    Highest education level: Not on file   Occupational History    Occupation: disability   Tobacco Use    Smoking status: Current Every Day Smoker     Packs/day: 0.50     Years: 15.00     Pack years: 7.50     Types: Cigarettes    Smokeless tobacco: Never Used   Vaping Use    Vaping Use: Former   Substance and Sexual Activity    Alcohol use: Yes     Comment: drinks daily    Drug use: Not Currently     Types: Opiates , Cocaine     Comment: Hx of opiates, benzos, cocaine, alcohol abuse    Sexual activity: Yes     Partners: Female     Comment: Lives alone, not working.    Other Topics Concern    Not on file   Social History Narrative    ** Merged History Encounter **          Social Determinants of Health     Financial Resource Strain: Medium Risk    Difficulty of Paying Living Expenses: Somewhat hard   Food Insecurity: No Food Insecurity    Worried About Running Out of Food in the Last Year: Never true    Ran Out of Food in the Last Year: Never true   Transportation Needs: No Transportation Needs    Lack of Transportation (Medical): No    Lack of Transportation (Non-Medical):  No   Physical Activity: Inactive    Days of Exercise per Week: 0 days    Minutes of Exercise per Session: 0 min   Stress: Stress Concern Present    Feeling of Stress : Rather much   Social Connections: Socially Isolated    Frequency of Communication with Friends and Family: Never    Frequency of Social Gatherings with Friends and Family: Never    Attends Holiness Services: Never    Active Member of Clubs or Organizations: No    Attends Club or Organization Meetings: Never    Marital Status: Never    Intimate Partner Violence: Not At Risk    Fear of Current or Ex-Partner: No    Emotionally Abused: No    Physically Abused: No    Sexually Abused: No   Housing Stability: Low Risk     Unable to Pay for Housing in the Last Year: No    Number of [place] in the Last Year: 1    Unstable Housing in the Last Year: No       Family History   Problem Relation Age of Onset    Diabetes Father     Alcohol Abuse Father     Depression Father     Arthritis Father     High Blood Pressure Father     Other Father         aneurysm & epilepsy    Migraines Father     Arthritis Mother     Other Mother         aneurysm & epilepsy    Migraines Mother     Diabetes Brother         Aunt and uncles    Depression Brother     Mental Illness Brother     Other Brother         epilepsy    Migraines Brother     Stroke Other         Uncle    Other Brother         murdered Oct 6th, 2014    Colon Cancer Paternal Cousin 43    Other Sister         epilepsy   Unk [place] Migraines Sister        Allergies:  Dicyclomine, Famotidine, Geodon [ziprasidone hcl], Haloperidol, Iv dye [iodides], Reglan [metoclopramide], Ibuprofen, Aspirin, Dicyclomine hcl, Hydroxyzine hcl, Iodine, Metronidazole, Mirtazapine, Promethazine, and Ziprasidone    Home Medications:  Prior to Admission medications    Medication Sig Start Date End Date Taking? Authorizing Provider   diphenhydrAMINE (BENADRYL) 25 MG capsule Take 1 capsule by mouth every 6 hours as needed (anxiety) 4/9/22 4/14/22  Bibi Huntley DO   traZODone (DESYREL) 50 MG tablet Take 1 tablet by mouth nightly as needed for Sleep 4/8/22   Alyson Alvarado MD   DULoxetine (CYMBALTA) 20 MG extended release capsule Take 1 capsule by mouth daily 4/8/22   Alyson Alvarado MD   meloxicam (MOBIC) 7.5 MG tablet Take 1 tablet by mouth 2 times daily as needed for Pain 8/19/21 10/30/21  MASSIEL Weston - CNP       REVIEW OF SYSTEMS    (2-9 systems for level 4, 10 or more for level 5)      Review of Systems    PHYSICAL EXAM   (up to 7 for level 4, 8 or more for level 5)     INITIAL VITALS:    oral temperature is 97.9 °F (36.6 °C). His blood pressure is 128/73 and his pulse is 85. His respiration is 16 and oxygen saturation is 95%. Physical Exam    DIFFERENTIAL  DIAGNOSIS     PLAN (LABS / IMAGING / EKG):  No orders of the defined types were placed in this encounter.       MEDICATIONS ORDERED:  Orders Placed This Encounter   Medications    DISCONTD: lidocaine-EPINEPHrine-tetracaine (LET) topical solution 3 mL syringe    DISCONTD: acetaminophen (TYLENOL) tablet 1,000 mg    DISCONTD: diphenhydrAMINE (BENADRYL) tablet 25 mg    lidocaine (XYLOCAINE) 2 % jelly    Tetanus-Diphth-Acell Pertussis (BOOSTRIX) injection 0.5 mL    diphenhydrAMINE (BENADRYL) injection 12.5 mg    DISCONTD: amoxicillin-clavulanate (AUGMENTIN) 875-125 MG per tablet 1 tablet     Order Specific Question:   Antimicrobial Indications     Answer:   Skin and Soft Tissue Infection     DDX: Dog bite, high risk for infection, need for tetanus    Initial MDM/Plan: 32 y.o. male who presents with dog bite to bilateral lower extremities. We will plan to treat pain with Tylenol and intradermal lidocaine for local pain control. We will clean the wound and repair as indicated. X-ray of the left lower extremity due to proximity to me and depth of the wound. Will give tetanus as well as Augmentin here in the emergency department and plan to discharge with weeks course of Augmentin. DIAGNOSTIC RESULTS / EMERGENCY DEPARTMENT COURSE / MDM     LABS:  Labs Reviewed - No data to display    RADIOLOGY:  No results found. EMERGENCY DEPARTMENT COURSE:  ED Course as of 04/12/22 1425   Tue Apr 12, 2022   1151 After wound was numbed with lidocaine and was cleaned, patient is stating that he does not want any further medical treatment stating he has some pain. Patient had been offered multiple medications for pain control including Tylenol, lidocaine and as requested by patient Benadryl. He did receive his tetanus vaccine prior to leaving. He also did receive 1 dose of Augmentin. I did to speak with patient as he was attempting to leave and stated I did think that this needed x-rays to rule out fracture as well as sutures to close the wound loosely. I did also  the patient that he needed a full weeks worth of antibiotics to prevent infection however patient stated he did not care and just wanted to go home. Patient was alert and oriented throughout his time in the emergency department and did have decision-making capacity. [CP]      ED Course User Index  [CP] Jeanna Foreman MD     Patient is a 20-year-old male presenting to the emergency department for evaluation of dog bite to bilateral lower extremities. Patient does have 1 superficial bite reuben to right posterior calf below the joint line of the knee. This once cleaned was not deep and did not require any repair.   Patient does have a deeper laceration noted to his anterior tibia just below the knee. Once cleaned and injected with lidocaine, patient did tolerate some gentle probing of the wound which did not reveal any foreign body. I did indicate to the patient that this would require a few sutures. Patient was offered multiple medications for pain control including Tylenol, and at patient's request Benadryl. He declined Tylenol. Did offer to inject more lidocaine for pain control if lidocaine was not adequate. Patient states that he just wanted to go home and did not want further treatment. I attempted to discuss risk of leaving without adequate treatment and without antibiotic but patient states that he does not care and does not wish to wait for x-ray or sutures or the antibiotic. Patient did ambulate with slight limp to the exit of the emergency department. I did reiterate to him on his way out that he can always return if pain worsens or he develops signs of infection. PROCEDURES:  None    CONSULTS:  None    CRITICAL CARE:  Please see attending note    FINAL IMPRESSION      1. Dog bite, initial encounter    2. Eloped from emergency department        DISPOSITION / [address] - Left Before Treatment Complete 04/12/2022 11:51:52 AM    PATIENTREFERRED TO:  No follow-up provider specified.     DISCHARGE MEDICATIONS:  Discharge Medication List as of 4/12/2022 11:55 AM          Marifer Delarosa MD  Emergency Medicine Resident    (Please note that portions of this note were completed with a voice recognition program.  Efforts were made to edit the dictations but occasionally words are mis-transcribed.)       Cheri Calvillo MD  Resident  04/12/22 3012

## 2022-04-13 NOTE — ED PROVIDER NOTES
EMERGENCY DEPARTMENT ENCOUNTER    Pt Name: Arpit Bray  MRN: [id number]  [place] 1990  Date of evaluation: 4/12/22  CHIEF COMPLAINT       Chief Complaint   Patient presents with    Animal Bite     left lower leg today     HISTORY OF PRESENT ILLNESS   Patient is a 68-year-old male with PMH of anxiety, bipolar, depression, rheumatoid arthritis who presents to the ED for evaluation of dog bite wound. Patient was bit by dog earlier today. He did seek medical treatment, had wound repair and was placed on Augmentin. He states that he is having PTSD from his dog bite, he ran out of his Xanax on Monday, and would like Benadryl to help him with his anxiety and itchy skin. No other issues at this time. ROS:  No fevers, cough, shortness of breath, chest pain, abdominal pain, nausea, vomiting, changes in urine or stool. REVIEW OF SYSTEMS     Review of Systems   All other systems reviewed and are negative.     PASTMEDICAL HISTORY     Past Medical History:   Diagnosis Date    Anxiety     Arthritis     Asthma     Bipolar I disorder, most recent episode (or current) depressed, unspecified 9/12/2014    Clostridium difficile infection     COPD (chronic obstructive pulmonary disease) (HCC)     Depression     Disease of blood and blood forming organ     Eczema     Fracture, metacarpal     R 4th and 5th    Gastric ulcer     Gastritis 06/13/2018    GERD (gastroesophageal reflux disease)     GI bleed     H. 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[address]     Substance abuse ([address]     Hx of opiates, benzos, cocaine, alcohol abuse    Suicidal ideation  Suicide attempt by hanging ([facility] [address]     Tobacco dependence     Ulcerative colitis ([facility] [address]     UTI (urinary tract infection)      SURGICAL HISTORY       Past Surgical History:   Procedure Laterality Date    ABDOMEN SURGERY      upper GI scope 7/7/2015    BRONCHOSCOPY      CHOLECYSTECTOMY, LAPAROSCOPIC  07/14/2017    surgery performed at [address], COLON, DIAGNOSTIC      OTHER SURGICAL HISTORY      lumbar puncture    [place] COLONOSCOPY W/BIOPSY SINGLE/MULTIPLE  8/9/2017    COLONOSCOPY WITH BIOPSY performed by Bia Johnson MD at  [address] EGD TRANSORAL BIOPSY SINGLE/MULTIPLE N/A 3/20/2017    EGD BIOPSY performed by Rayo Worthy MD at [facility]    [place] ESOPHAGOGASTRODUODENOSCOPY TRANSORAL DIAGNOSTIC N/A 8/9/2017    EGD ESOPHAGOGASTRODUODENOSCOPY performed by Bia Johnson MD at [facility] N/A 3/20/2017    SIGMOIDOSCOPY DIAGNOSTIC FLEXIBLE performed by Rayo Worthy MD at [facility]  2/4/16    UPPER GASTROINTESTINAL ENDOSCOPY N/A 6/13/2018    GASTRITIS    UPPER GASTROINTESTINAL ENDOSCOPY N/A 9/20/2018    EGD BIOPSY performed by Erika Roland MD at [facility]       Previous Medications    DIPHENHYDRAMINE (BENADRYL) 25 MG CAPSULE    Take 1 capsule by mouth every 6 hours as needed (anxiety)    DULOXETINE (CYMBALTA) 20 MG EXTENDED RELEASE CAPSULE    Take 1 capsule by mouth daily    TRAZODONE (DESYREL) 50 MG TABLET    Take 1 tablet by mouth nightly as needed for Sleep     ALLERGIES     is allergic to dicyclomine, famotidine, geodon [ziprasidone hcl], haloperidol, iv dye [iodides], reglan [metoclopramide], ibuprofen, aspirin, dicyclomine hcl, hydroxyzine hcl, iodine, metronidazole, mirtazapine, promethazine, and ziprasidone. FAMILY HISTORY     He indicated that his mother is alive. He indicated that his father is alive.  He indicated that the status of his sister is unknown. He indicated that the status of his other is unknown. He indicated that the status of his paternal cousin is unknown. SOCIAL HISTORY       Social History     Tobacco Use    Smoking status: Current Every Day Smoker     Packs/day: 0.50     Years: 15.00     Pack years: 7.50     Types: Cigarettes    Smokeless tobacco: Never Used   Vaping Use    Vaping Use: Former   Substance Use Topics    Alcohol use: Yes     Comment: drinks daily    Drug use: Not Currently     Types: Opiates , Cocaine     Comment: Hx of opiates, benzos, cocaine, alcohol abuse     PHYSICAL EXAM     INITIAL VITALS: BP (!) 140/91   Pulse 88   Temp 99 °F (37.2 °C) (Oral)   Resp 16   Ht 5' 9\" (1.753 m)   Wt 220 lb (99.8 kg)   SpO2 99%   BMI 32.49 kg/m²    Physical Exam  HENT:      Head: Normocephalic. Right Ear: External ear normal.      Left Ear: External ear normal.      Nose: Nose normal.   Eyes:      Conjunctiva/sclera: Conjunctivae normal.   Cardiovascular:      Rate and Rhythm: Normal rate. Pulmonary:      Effort: Pulmonary effort is normal.   Abdominal:      General: Abdomen is flat. Musculoskeletal:        Legs:    Skin:     General: Skin is dry. Neurological:      Mental Status: He is alert. Mental status is at baseline. Psychiatric:         Mood and Affect: Mood normal.         Behavior: Behavior normal.         MEDICAL DECISION MAKING:   The patient is hemodynamically stable, afebrile, nontoxic-appearing. Physical exam notable for healing dog bite wound left proximal anterior lower extremity, well-healing. ED plan for Benadryl IM, Tylenol, discharge.           DIAGNOSTIC RESULTS   EKG:All EKG's are interpreted by the Emergency Department Physician who either signs or Co-signs this chart in the absence of a cardiologist.        RADIOLOGY:All plain film, CT, MRI, and formal ultrasound images (except ED bedside ultrasound) are read by the radiologist, see reports below, unless otherwisenoted in MDM or here. No orders to display     LABS: All lab results were reviewed by myself, and all abnormals are listed below. Labs Reviewed - No data to display    EMERGENCY DEPARTMENTCOURSE:         Vitals:    Vitals:    04/12/22 2126   BP: (!) 140/91   Pulse: 88   Resp: 16   Temp: 99 °F (37.2 °C)   TempSrc: Oral   SpO2: 99%   Weight: 220 lb (99.8 kg)   Height: 5' 9\" (1.753 m)       The patient was given the following medications while in the emergency department:  Orders Placed This Encounter   Medications    diphenhydrAMINE (BENADRYL) injection 25 mg    acetaminophen (TYLENOL) tablet 1,000 mg     CONSULTS:  None    FINAL IMPRESSION      1.  Dog bite, subsequent encounter          DISPOSITION/PLAN   DISPOSITION Decision To Discharge 04/12/2022 10:53:00 PM      PATIENT REFERRED TO:  None Provider    In 2 days      DISCHARGE MEDICATIONS:  New Prescriptions    No medications on file     Westley Cowan MD  Attending Emergency Physician                    Viktor Ledesma MD  04/12/22 2256       Viktor Ledesma MD  04/12/22 2257

## 2022-04-20 ENCOUNTER — APPOINTMENT (OUTPATIENT)
Dept: GENERAL RADIOLOGY | Age: 32
End: 2022-04-20
Payer: COMMERCIAL

## 2022-04-20 ENCOUNTER — HOSPITAL ENCOUNTER (EMERGENCY)
Age: 32
Discharge: HOME | End: 2022-04-20
Attending: EMERGENCY MEDICINE
Payer: COMMERCIAL

## 2022-04-20 VITALS
SYSTOLIC BLOOD PRESSURE: 118 MMHG | BODY MASS INDEX: 33.34 KG/M2 | DIASTOLIC BLOOD PRESSURE: 78 MMHG | HEIGHT: 68 IN | RESPIRATION RATE: 18 BRPM | OXYGEN SATURATION: 94 % | HEART RATE: 84 BPM | WEIGHT: 220 LBS | TEMPERATURE: 98.3 F

## 2022-04-20 VITALS
RESPIRATION RATE: 19 BRPM | TEMPERATURE: 98.2 F | HEART RATE: 81 BPM | HEIGHT: 68 IN | WEIGHT: 200 LBS | OXYGEN SATURATION: 100 % | DIASTOLIC BLOOD PRESSURE: 84 MMHG | SYSTOLIC BLOOD PRESSURE: 144 MMHG | BODY MASS INDEX: 30.31 KG/M2
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
## 2022-04-20 DIAGNOSIS — Z48.02 VISIT FOR SUTURE REMOVAL: Primary | ICD-10-CM

## 2022-04-20 PROCEDURE — 99282 EMERGENCY DEPT VISIT SF MDM: CPT

## 2022-04-20 PROCEDURE — 6360000002 HC RX W HCPCS: Performed by: EMERGENCY MEDICINE

## 2022-04-20 PROCEDURE — 73590 X-RAY EXAM OF LOWER LEG: CPT

## 2022-04-20 PROCEDURE — 6370000000 HC RX 637 (ALT 250 FOR IP): Performed by: EMERGENCY MEDICINE

## 2022-04-20 PROCEDURE — 96372 THER/PROPH/DIAG INJ SC/IM: CPT

## 2022-04-20 PROCEDURE — 99284 EMERGENCY DEPT VISIT MOD MDM: CPT

## 2022-04-20 RX ORDER — DIPHENHYDRAMINE HCL 25 MG
25 TABLET ORAL ONCE
Status: COMPLETED | OUTPATIENT
Start: 2022-04-20 | End: 2022-04-20

## 2022-04-20 RX ORDER — PROMETHAZINE HYDROCHLORIDE 25 MG/ML
25 INJECTION, SOLUTION INTRAMUSCULAR; INTRAVENOUS ONCE
Status: COMPLETED | OUTPATIENT
Start: 2022-04-20 | End: 2022-04-20

## 2022-04-20 RX ORDER — KETOROLAC TROMETHAMINE 15 MG/ML
15 INJECTION, SOLUTION INTRAMUSCULAR; INTRAVENOUS ONCE
Status: DISCONTINUED | OUTPATIENT
Start: 2022-04-20 | End: 2022-04-20 | Stop reason: HOSPADM

## 2022-04-20 RX ADMIN — DIPHENHYDRAMINE HCL 25 MG: 25 TABLET ORAL at 17:01

## 2022-04-20 RX ADMIN — PROMETHAZINE HYDROCHLORIDE 25 MG: 25 INJECTION INTRAMUSCULAR; INTRAVENOUS at 17:01

## 2022-04-20 ASSESSMENT — ENCOUNTER SYMPTOMS
SORE THROAT: 0
FACIAL SWELLING: 0
COLOR CHANGE: 0
VOMITING: 0
ABDOMINAL PAIN: 0
SHORTNESS OF BREATH: 0
CONSTIPATION: 0
COUGH: 0
DIARRHEA: 0
EYE REDNESS: 0
EYE DISCHARGE: 0

## 2022-04-20 ASSESSMENT — PAIN - FUNCTIONAL ASSESSMENT
PAIN_FUNCTIONAL_ASSESSMENT: 0-10
PAIN_FUNCTIONAL_ASSESSMENT: 0-10

## 2022-04-20 ASSESSMENT — PAIN DESCRIPTION - ORIENTATION: ORIENTATION: LEFT;RIGHT

## 2022-04-20 ASSESSMENT — PAIN DESCRIPTION - PAIN TYPE: TYPE: ACUTE PAIN

## 2022-04-20 ASSESSMENT — PAIN DESCRIPTION - DESCRIPTORS: DESCRIPTORS: ACHING

## 2022-04-20 ASSESSMENT — PAIN SCALES - GENERAL
PAINLEVEL_OUTOF10: 7
PAINLEVEL_OUTOF10: 7

## 2022-04-20 ASSESSMENT — PAIN DESCRIPTION - LOCATION: LOCATION: KNEE;LEG

## 2022-04-20 ASSESSMENT — PAIN DESCRIPTION - FREQUENCY: FREQUENCY: CONTINUOUS

## 2022-04-20 NOTE — ED PROVIDER NOTES
[facility]     Emergency Department     Faculty Note/ Attestation      Pt Name: Arbutus Copper                                       MRN: [id number]  [place] 1990  Date of evaluation: 4/20/2022    Patients PCP:    None Provider    Attestation  I performed a history and physical examination of the patient and discussed management with the resident. I reviewed the residents note and agree with the documented findings and plan of care. Any areas of disagreement are noted on the chart. I was personally present for the key portions of any procedures. I have documented in the chart those procedures where I was not present during the key portions. I have reviewed the emergency nurses triage note. I agree with the chief complaint, past medical history, past surgical history, allergies, medications, social and family history as documented unless otherwise noted below. For Physician Assistant/ Nurse Practitioner cases/documentation I have personally evaluated this patient and have completed at least one if not all key elements of the E/M (history, physical exam, and MDM). Additional findings are as noted. Initial Screens:             Vitals:    Vitals:    04/20/22 1621   BP: (!) 144/84   Pulse: 81   Resp: 19   Temp: 98.2 °F (36.8 °C)   TempSrc: Oral   SpO2: 100%   Weight: 200 lb (90.7 kg)   Height: 5' 8\" (1.727 m)       CHIEF COMPLAINT       Chief Complaint   Patient presents with    Animal Bite    Anxiety     The pt is a 33 YO M bitten 6 days ago has been having increasing anxiety with n/v and headaches. The pt feeling weak and itching. The pt is taking his antibiotics. The pt has run out of Xanex and is not following with PCP. EMERGENCY DEPARTMENT COURSE:     -------------------------  BP: (!) 144/84, Temp: 98.2 °F (36.8 °C), Pulse: 81, Resp: 19  Physical Exam  Constitutional:       Appearance: He is well-developed. He is not diaphoretic.    HENT:      Head: Normocephalic and atraumatic. Right Ear: External ear normal.      Left Ear: External ear normal.   Eyes:      General: No scleral icterus. Right eye: No discharge. Left eye: No discharge. Neck:      Trachea: No tracheal deviation. Pulmonary:      Effort: Pulmonary effort is normal. No respiratory distress. Breath sounds: No stridor. Musculoskeletal:         General: Normal range of motion. Cervical back: Normal range of motion. Skin:     General: Skin is warm and dry. Neurological:      Mental Status: He is alert and oriented to person, place, and time. Coordination: Coordination normal.         Comments  The pt did not wish to stay and left prior to suture removal    ED Course as of 04/20/22 1747 Wed Apr 20, 2022 1727 Patient is here on day 6 with his stitches. He is poorly adherent to medical therapy and will remove now to avoid any prolonged presence of them [WK]      ED Course User Index  [WK] DO Cristina Petersen, ,, DO, RDMS.   Attending Emergency Physician            Cristina Thomas DO  04/20/22 1747

## 2022-04-20 NOTE — ED PROVIDER NOTES
epilepsy    Migraines Mother     Diabetes Brother         Aunt and uncles    Depression Brother     Mental Illness Brother     Other Brother         epilepsy    Migraines Brother     Stroke Other         Uncle    Other Brother         murdered Oct 6th, 2014    Colon Cancer Paternal Cousin 43    Other Sister         epilepsy    Migraines Sister      Family Status   Relation Name Status    Father  Alive    Mother  Alive    Brother  (Not Specified)    Other  (Not Specified)   [facility] Brother  (Not Specified)   Sedrick Cuevas  (Not Specified)    Sister  (Not Specified)        SOCIAL HISTORY      reports that he has been smoking cigarettes. He has a 7.50 pack-year smoking history. He has never used smokeless tobacco. He reports current alcohol use. He reports previous drug use. Drugs: Opiates  and Cocaine. REVIEW OF SYSTEMS    (2-9 systems for level 4, 10 or more for level 5)     Review of Systems   Constitutional: Negative for chills, fatigue and fever. HENT: Negative for congestion, ear discharge and facial swelling. Eyes: Negative for discharge and redness. Respiratory: Negative for cough and shortness of breath. Cardiovascular: Negative for chest pain. Gastrointestinal: Negative for abdominal pain, constipation, diarrhea and vomiting. Genitourinary: Negative for dysuria and hematuria. Musculoskeletal: Negative for arthralgias. Skin: Negative for color change and rash. Neurological: Negative for syncope, numbness and headaches. Hematological: Negative for adenopathy. Psychiatric/Behavioral: Negative for confusion. The patient is nervous/anxious. Except as noted above the remainder of the review of systems was reviewed and negative.      PHYSICAL EXAM    (up to 7 for level 4, 8 or more for level 5)     Vitals:    04/20/22 1858   BP: 118/78   Pulse: 84   Resp: 18   Temp: 98.3 °F (36.8 °C)   TempSrc: Oral   SpO2: 94%   Weight: 220 lb (99.8 kg)   Height: 5' 8\" (1.727 m) Physical Exam  Constitutional:       General: He is not in acute distress. Appearance: He is well-developed. He is not diaphoretic. HENT:      Head: Normocephalic and atraumatic. Eyes:      General: No scleral icterus. Right eye: No discharge. Left eye: No discharge. Cardiovascular:      Rate and Rhythm: Normal rate and regular rhythm. Pulmonary:      Effort: Pulmonary effort is normal. No respiratory distress. Breath sounds: Normal breath sounds. No stridor. No wheezing or rales. Abdominal:      General: There is no distension. Palpations: Abdomen is soft. Tenderness: There is no abdominal tenderness. Musculoskeletal:         General: Normal range of motion. Cervical back: Neck supple. Lymphadenopathy:      Cervical: No cervical adenopathy. Skin:     General: Skin is warm and dry. Findings: No erythema or rash. Comments: Sutures are in place on his leg, 3 in number. I have remove them. There is no drainage or dehiscence. The wound appears well-healed. Neurological:      Mental Status: He is alert and oriented to person, place, and time. Psychiatric:         Behavior: Behavior normal.      Comments: He appears calm. DIAGNOSTIC RESULTS     EKG: All EKG's are interpreted by the Emergency Department Physician who either signs or Co-signs this chart in the absence of a cardiologist.    Not indicated    RADIOLOGY:   Non-plain film images such as CT, Ultrasound and MRI are read by the radiologist. Plain radiographic images are visualized and preliminarily interpreted by the emergency physician with the below findings:    Not indicate    Interpretation per the Radiologist below, if available at the time of this note:        LABS:  Labs Reviewed - No data to display    All other labs were within normal range or not returned as of this dictation.     EMERGENCY DEPARTMENT COURSE and DIFFERENTIAL DIAGNOSIS/MDM:   Vitals:    Vitals: 04/20/22 1858   BP: 118/78   Pulse: 84   Resp: 18   Temp: 98.3 °F (36.8 °C)   TempSrc: Oral   SpO2: 94%   Weight: 220 lb (99.8 kg)   Height: 5' 8\" (1.727 m)       No orders of the defined types were placed in this encounter. Medical Decision Making: The patient has had his sutures removed and there appeared to be no complications. He was not prescribed anxiety medications. He was advised that these would need to come from his own physician. Treatment diagnosis and follow-up were discussed with the patient. CONSULTS:  None    PROCEDURES:  None    FINAL IMPRESSION      1. Visit for suture removal          DISPOSITION/PLAN   DISPOSITION Decision To Discharge 04/20/2022 07:18:35 PM      PATIENT REFERRED TO:   [facility] ED  [address]  [phone]    If symptoms worsen      DISCHARGE MEDICATIONS:     New Prescriptions    No medications on file       The care is provided during an unprecedented national emergency due to the novel coronavirus, COVID-19.     (Please note that portions of this note were completed with a voice recognition program.  Efforts were made to edit the dictations but occasionally words are mis-transcribed.)    Britton Combs MD  Attending Emergency Physician            Britton Combs MD  04/20/22 [id number]

## 2022-04-21 ENCOUNTER — HOSPITAL ENCOUNTER (EMERGENCY)
Age: 32
Discharge: HOME | End: 2022-04-22
Attending: STUDENT IN AN ORGANIZED HEALTH CARE EDUCATION/TRAINING PROGRAM
Payer: COMMERCIAL

## 2022-04-21 VITALS
DIASTOLIC BLOOD PRESSURE: 73 MMHG | SYSTOLIC BLOOD PRESSURE: 122 MMHG | OXYGEN SATURATION: 95 % | BODY MASS INDEX: 33.34 KG/M2 | HEIGHT: 68 IN | TEMPERATURE: 98 F | RESPIRATION RATE: 16 BRPM | WEIGHT: 220 LBS | HEART RATE: 80 BPM

## 2022-04-21 DIAGNOSIS — Z51.89 ENCOUNTER FOR WOUND RE-CHECK: Primary | ICD-10-CM

## 2022-04-21 DIAGNOSIS — R11.0 NAUSEA: ICD-10-CM

## 2022-04-21 DIAGNOSIS — F41.9 ANXIETY: ICD-10-CM

## 2022-04-21 PROCEDURE — 99283 EMERGENCY DEPT VISIT LOW MDM: CPT

## 2022-04-21 ASSESSMENT — ENCOUNTER SYMPTOMS
BACK PAIN: 1
ABDOMINAL PAIN: 0
STRIDOR: 0
EYE REDNESS: 0
VOMITING: 1
DIARRHEA: 1
COLOR CHANGE: 0
COUGH: 1
CHOKING: 0
EYE PAIN: 0
EYE ITCHING: 0
EYE DISCHARGE: 0
PHOTOPHOBIA: 0
FACIAL SWELLING: 0
SHORTNESS OF BREATH: 0
SINUS PAIN: 0
WHEEZING: 0
VOICE CHANGE: 0
ABDOMINAL DISTENTION: 0
CONSTIPATION: 0
NAUSEA: 0
APNEA: 0
RHINORRHEA: 0

## 2022-04-21 ASSESSMENT — PAIN SCALES - GENERAL: PAINLEVEL_OUTOF10: 7

## 2022-04-21 ASSESSMENT — PAIN - FUNCTIONAL ASSESSMENT: PAIN_FUNCTIONAL_ASSESSMENT: 0-10

## 2022-04-22 RX ORDER — MAGNESIUM HYDROXIDE/ALUMINUM HYDROXICE/SIMETHICONE 120; 1200; 1200 MG/30ML; MG/30ML; MG/30ML
30 SUSPENSION ORAL ONCE
Status: DISCONTINUED | OUTPATIENT
Start: 2022-04-22 | End: 2022-04-22 | Stop reason: HOSPADM

## 2022-04-22 ASSESSMENT — ENCOUNTER SYMPTOMS
EYE DISCHARGE: 0
EYE REDNESS: 0
ABDOMINAL PAIN: 0
SHORTNESS OF BREATH: 0

## 2022-04-22 NOTE — ED NOTES
Pt presents to the er anxiety and left knee pain with no known injury     Piter Davis RN  04/22/22 9532

## 2022-04-22 NOTE — ED PROVIDER NOTES
[facility]  Emergency Department Encounter     Pt Name: Bertha Elizabeth  MRN: [id number]  [place] 1990  Date of evaluation: 4/22/22  PCP:  None Provider    CHIEF COMPLAINT       Chief Complaint   Patient presents with    Knee Pain     bit by dog, states fracture of L knee    Anxiety       HISTORY OFPRESENT ILLNESS  (Location/Symptom, Timing/Onset, Context/Setting, Quality, Duration, Modifying Dennis Loser.)      Bertha Elizabeth is a 32 y.o. male who presents with left knee pain and anxiety   pain is worse with movement and pain is rated as moderate. Pain is located over the left knee and radiates to the none. Pain has been constant and worsening since injury. Movement is making pain worse. States he was bit by a dog approximately a week ago. Sutures removed 2 days ago. Concerned about infection. Also requesting anxiety medication. Pain in his left knee. Moderate. Intermittent. Sharp. PAST MEDICAL / SURGICAL / SOCIAL / FAMILY HISTORY      has a past medical history of Anxiety, Arthritis, Asthma, Bipolar I disorder, most recent episode (or current) depressed, unspecified, Clostridium difficile infection, COPD (chronic obstructive pulmonary disease) ([address] Depression, Disease of blood and blood forming organ, Eczema, Fracture, metacarpal, Gastric ulcer, Gastritis, GERD (gastroesophageal reflux disease), GI bleed, H. pylori infection, H/O blood clots, Head injury, Headache, Insomnia, Juvenile rheumatoid arthritis ([address] Neuromuscular disorder ([address] PFAPA syndrome ([address] PUD (peptic ulcer disease), Rheumatoid arthritis ([address] Rheumatoid arthritis(714.0), Severe recurrent major depression without psychotic features ([address] Sleep apnea, 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[address] Substance abuse ([address] Suicidal ideation, Suicide attempt by hanging ([address] Tobacco dependence, Ulcerative colitis ([address] and UTI (urinary tract infection).      has a past surgical history that includes Colonoscopy; bronchoscopy; other surgical history; Upper gastrointestinal endoscopy (2/4/16); pr egd transoral biopsy single/multiple (N/A, 3/20/2017); sigmoidoscopy (N/A, 3/20/2017); Cholecystectomy, laparoscopic (07/14/2017); pr esophagogastroduodenoscopy transoral diagnostic (N/A, 8/9/2017); pr colonoscopy w/biopsy single/multiple (8/9/2017); Abdomen surgery; Upper gastrointestinal endoscopy (N/A, 6/13/2018); Upper gastrointestinal endoscopy (N/A, 9/20/2018); and Endoscopy, colon, diagnostic. Social History     Socioeconomic History    Marital status: Single     Spouse name: Not on file    Number of children: Not on file    Years of education: Not on file    Highest education level: Not on file   Occupational History    Occupation: disability   Tobacco Use    Smoking status: Current Every Day Smoker     Packs/day: 0.50     Years: 15.00     Pack years: 7.50     Types: Cigarettes    Smokeless tobacco: Never Used   Vaping Use    Vaping Use: Former   Substance and Sexual Activity    Alcohol use: Yes     Comment: drinks daily    Drug use: Not Currently     Types: Opiates , Cocaine     Comment: Hx of opiates, benzos, cocaine, alcohol abuse    Sexual activity: Yes     Partners: Female     Comment: Lives alone, not working. Other Topics Concern    Not on file   Social History Narrative    ** Merged History Encounter **          Social Determinants of Health     Financial Resource Strain: Medium Risk    Difficulty of Paying Living Expenses: Somewhat hard   Food Insecurity: No Food Insecurity    Worried About Running Out of Food in the Last Year: Never true    Micaela of Food in the Last Year: Never true   Transportation Needs: No Transportation Needs    Lack of Transportation (Medical): No    Lack of Transportation (Non-Medical):  No   Physical Activity: Inactive    Days of Exercise per Week: 0 days    Minutes of Exercise per Session: 0 min   Stress: Stress Concern Present    Feeling of Stress : Rather much MD Les   DULoxetine (CYMBALTA) 20 MG extended release capsule Take 1 capsule by mouth daily 4/8/22   Roberto Thomas MD   meloxicam (MOBIC) 7.5 MG tablet Take 1 tablet by mouth 2 times daily as needed for Pain 8/19/21 10/30/21  MASSIEL Santos - CNP       REVIEW OF SYSTEMS    (2-9 systems for level 4, 10 or more for level 5)      Review of Systems   Constitutional: Negative for chills and fever. Eyes: Negative for discharge and redness. Respiratory: Negative for shortness of breath. Cardiovascular: Negative for chest pain. Gastrointestinal: Negative for abdominal pain. Genitourinary: Negative for dysuria. Musculoskeletal: Negative for arthralgias. Skin: Positive for wound. Allergic/Immunologic: Positive for environmental allergies. Neurological: Negative for headaches. Psychiatric/Behavioral: Negative for agitation and confusion. PHYSICAL EXAM   (up to 7 for level 4, 8 or more for level 5)     INITIAL VITALS:    height is 5' 8\" (1.727 m) and weight is 220 lb (99.8 kg). His oral temperature is 98 °F (36.7 °C). His blood pressure is 122/73 and his pulse is 80. His respiration is 16 and oxygen saturation is 95%. Physical Exam  Vitals and nursing note reviewed. Constitutional:       Appearance: He is well-developed. HENT:      Head: Normocephalic and atraumatic. Nose: Nose normal.      Mouth/Throat:      Mouth: Mucous membranes are moist.   Eyes:      General: No scleral icterus. Conjunctiva/sclera: Conjunctivae normal.      Pupils: Pupils are equal, round, and reactive to light. Cardiovascular:      Rate and Rhythm: Normal rate and regular rhythm. Heart sounds: Normal heart sounds. No murmur heard. No friction rub. No gallop. Pulmonary:      Effort: Pulmonary effort is normal. No respiratory distress. Breath sounds: Normal breath sounds. No wheezing or rales. Abdominal:      Palpations: Abdomen is soft. Tenderness:  There is abdominal tenderness. Musculoskeletal:         General: Normal range of motion. Comments: Small abrasions to left knee, no erythema, no increased warmth   Skin:     General: Skin is warm and dry. Findings: No erythema or rash. Neurological:      Mental Status: He is alert and oriented to person, place, and time. Psychiatric:         Behavior: Behavior normal.      Comments: Linear thought process, flat affect         DIFFERENTIAL  DIAGNOSIS     PLAN (LABS / IMAGING / EKG):  No orders of the defined types were placed in this encounter. MEDICATIONS ORDERED:  Orders Placed This Encounter   Medications    DISCONTD: aluminum & magnesium hydroxide-simethicone (MAALOX) 200-200-20 MG/5ML suspension 30 mL    aluminum-magnesium hydroxide 200-200 MG/5ML suspension     Sig: Take 10 mLs by mouth every 6 hours as needed for Indigestion     Dispense:  710 mL     Refill:  ml       DDX: Contusion versus sprain versus fracture    Initial MDM/Plan: 32 y.o. male who presents with knee pain, anxiety, epigastric pain. Has been recently on antibiotics. Having some nausea and diarrhea. Patient with multiple visits for same. Multiple antibiotic allergies. No vomiting emergency department. Normal vital signs. Maalox and have patient follow-up primary care doctor. No signs of infection in the knee. DIAGNOSTIC RESULTS / EMERGENCY DEPARTMENT COURSE / MDM     LABS:  Labs Reviewed - No data to display      RADIOLOGY:  No results found. EMERGENCY DEPARTMENT COURSE:           · Based on the low acuity of concerning symptoms and improvement of symptoms, patient will be discharged with follow up and prescription information listed in the Disposition section. · Patient states they will follow-up with primary care physician and/or return to the emergency department should they experience a change or worsening of symptoms.   · Patient will be discharged with resources: summary of visit, instructions, follow-up information, prescriptions if necessary. · Patient/ family instructed to read discharge paperwork. All of their questions and concerns were addressed. · Suspicion for any acute life-threatening processes is low. Patient voices understanding of plan. PROCEDURES:  None    CONSULTS:  None    CRITICAL CARE:  0    FINAL IMPRESSION      1. Encounter for wound re-check    2. Nausea    3. Anxiety          DISPOSITION / PLAN     DISPOSITION Decision To Discharge 04/22/2022 12:48:17 AM    Discharge    PATIENTREFERRED TO:  No follow-up provider specified.     DISCHARGE MEDICATIONS:  Discharge Medication List as of 4/22/2022  1:17 AM      START taking these medications    Details   aluminum-magnesium hydroxide 200-200 MG/5ML suspension Take 10 mLs by mouth every 6 hours as needed for Indigestion, Disp-710 mL, R-mlPrint             Melodie Bhardwaj DO  EmergencyMedicine Attending    (Please note that portions of this note were completed with a voice recognition program.  Efforts were made to edit the dictations but occasionally words are mis-transcribed.)       Melodie Bhardwaj DO  04/22/22 [id number]

## 2022-04-22 NOTE — ED NOTES
Pt presents to the er c/o bilateral knee pain with no known injury and anxiety      Jared Giordano RN  04/21/22 2508

## 2022-04-23 ENCOUNTER — HOSPITAL ENCOUNTER (EMERGENCY)
Age: 32
Discharge: LEFT BEFORE DISCHARGE | End: 2022-04-23
Attending: STUDENT IN AN ORGANIZED HEALTH CARE EDUCATION/TRAINING PROGRAM
Payer: COMMERCIAL

## 2022-04-23 VITALS
RESPIRATION RATE: 20 BRPM | SYSTOLIC BLOOD PRESSURE: 130 MMHG | HEART RATE: 73 BPM | TEMPERATURE: 98.4 F | DIASTOLIC BLOOD PRESSURE: 116 MMHG | OXYGEN SATURATION: 97 %

## 2022-04-23 DIAGNOSIS — F41.1 ANXIETY STATE: Primary | ICD-10-CM

## 2022-04-23 DIAGNOSIS — Z76.5 DRUG-SEEKING BEHAVIOR: ICD-10-CM

## 2022-04-23 DIAGNOSIS — Z53.21 ELOPED FROM EMERGENCY DEPARTMENT: ICD-10-CM

## 2022-04-23 PROCEDURE — 99283 EMERGENCY DEPT VISIT LOW MDM: CPT

## 2022-04-23 RX ORDER — DIAPER,BRIEF,INFANT-TODD,DISP
EACH MISCELLANEOUS
Qty: 120 G | Refills: 0 | Status: SHIPPED | OUTPATIENT
Start: 2022-04-23 | End: 2022-04-30

## 2022-04-23 ASSESSMENT — ENCOUNTER SYMPTOMS
COLOR CHANGE: 0
EYE DISCHARGE: 0
EYE REDNESS: 0
ABDOMINAL PAIN: 0
SHORTNESS OF BREATH: 0

## 2022-04-24 NOTE — ED PROVIDER NOTES
[facility]  Emergency Department Encounter     Pt Name: Sweetie Farfan  MRN: [id number]  [place] 1990  Date of evaluation: 4/23/22  PCP:  None Provider    [address] COMPLAINT       Chief Complaint   Patient presents with    Anxiety     off his xanax x3 days       HISTORY OFPRESENT ILLNESS  (Location/Symptom, Timing/Onset, Context/Setting, Quality, Duration, Modifying Factors,Severity.)      Sweetie Farfan is a 32 y.o. male who presents with complaint of anxiety and medication refill. Patient is well-known to myself and has seen psychiatry multiple times and there is concern for benzodiazepine seeking behavior on their most recent note. Denies any chest pain, shortness of breath, nausea, vomiting. States he has been out of his Xanax for 3 days. Requesting refill. PAST MEDICAL / SURGICAL / SOCIAL / FAMILY HISTORY      has a past medical history of Anxiety, Arthritis, Asthma, Bipolar I disorder, most recent episode (or current) depressed, unspecified, Clostridium difficile infection, COPD (chronic obstructive pulmonary disease) ([address] Depression, Disease of blood and blood forming organ, Eczema, Fracture, metacarpal, Gastric ulcer, Gastritis, GERD (gastroesophageal reflux disease), GI bleed, H. pylori infection, H/O blood clots, Head injury, Headache, Insomnia, Juvenile rheumatoid arthritis ([address] Neuromuscular disorder ([address] PFAPA syndrome ([address] PUD (peptic ulcer disease), Rheumatoid arthritis ([address] Rheumatoid arthritis(714.0), Severe recurrent major depression without psychotic features ([address] Sleep apnea, 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[address] Substance abuse ([address] Suicidal ideation, Suicide attempt by hanging ([address] Tobacco dependence, Ulcerative colitis ([address] and UTI (urinary tract infection). has a past surgical history that includes Colonoscopy; bronchoscopy; other surgical history;  Upper gastrointestinal endoscopy (2/4/16); pr egd transoral biopsy single/multiple (N/A, 3/20/2017); sigmoidoscopy (N/A, 3/20/2017); Cholecystectomy, laparoscopic (07/14/2017); pr esophagogastroduodenoscopy transoral diagnostic (N/A, 8/9/2017); pr colonoscopy w/biopsy single/multiple (8/9/2017); Abdomen surgery; Upper gastrointestinal endoscopy (N/A, 6/13/2018); Upper gastrointestinal endoscopy (N/A, 9/20/2018); and Endoscopy, colon, diagnostic. Social History     Socioeconomic History    Marital status: Single     Spouse name: Not on file    Number of children: Not on file    Years of education: Not on file    Highest education level: Not on file   Occupational History    Occupation: disability   Tobacco Use    Smoking status: Current Every Day Smoker     Packs/day: 0.50     Years: 15.00     Pack years: 7.50     Types: Cigarettes    Smokeless tobacco: Never Used   Vaping Use    Vaping Use: Former   Substance and Sexual Activity    Alcohol use: Yes     Comment: drinks daily    Drug use: Not Currently     Types: Opiates , Cocaine     Comment: Hx of opiates, benzos, cocaine, alcohol abuse    Sexual activity: Yes     Partners: Female     Comment: Lives alone, not working. Other Topics Concern    Not on file   Social History Narrative    ** Merged History Encounter **          Social Determinants of Health     Financial Resource Strain: Medium Risk    Difficulty of Paying Living Expenses: Somewhat hard   Food Insecurity: No Food Insecurity    Worried About Running Out of Food in the Last Year: Never true    Micaela of Food in the Last Year: Never true   Transportation Needs: No Transportation Needs    Lack of Transportation (Medical): No    Lack of Transportation (Non-Medical):  No   Physical Activity: Inactive    Days of Exercise per Week: 0 days    Minutes of Exercise per Session: 0 min   Stress: Stress Concern Present    Feeling of Stress : Rather much   Social Connections: Socially Isolated    Frequency of Communication with Friends and Family: Never    Frequency of Social Gatherings with Friends and Family: Never    Attends [employer] Services: Never    Active Member of Clubs or Organizations: No    Attends Club or Organization Meetings: Never    Marital Status: Never    Intimate Partner Violence: Not At Risk    Fear of Current or Ex-Partner: No    Emotionally Abused: No    Physically Abused: No    Sexually Abused: No   Housing Stability: Low Risk     Unable to Pay for Housing in the Last Year: No    Number of [place] in the Last Year: 1    Unstable Housing in the Last Year: No       Family History   Problem Relation Age of Onset    Diabetes Father     Alcohol Abuse Father     Depression Father     Arthritis Father     High Blood Pressure Father     Other Father         aneurysm & epilepsy    Migraines Father     Arthritis Mother     Other Mother         aneurysm & epilepsy    Migraines Mother     Diabetes Brother         Aunt and uncles    Depression Brother     Mental Illness Brother     Other Brother         epilepsy    Migraines Brother     Stroke Other         Uncle    Other Brother         murdered Oct 6th, 2014    Colon Cancer Paternal Cousin 43    Other Sister         epilepsy   Ana Luisa Kale Migraines Sister        Allergies:  Dicyclomine, Famotidine, Geodon [ziprasidone hcl], Haloperidol, Iv dye [iodides], Reglan [metoclopramide], Ibuprofen, Aspirin, Dicyclomine hcl, Hydroxyzine hcl, Iodine, Metronidazole, Mirtazapine, Promethazine, and Ziprasidone    Home Medications:  Prior to Admission medications    Medication Sig Start Date End Date Taking? Authorizing Provider   hydrocortisone 1 % cream Apply topically 2 -3 times daily for 5 - 7 days to back. Do not apply to face.  4/23/22 4/30/22 Yes Pedro Beavers DO   aluminum-magnesium hydroxide 200-200 MG/5ML suspension Take 10 mLs by mouth every 6 hours as needed for Indigestion 4/22/22   Pedro Beavers DO   traZODone (DESYREL) 50 MG tablet Take 1 tablet by mouth nightly as needed for Sleep 4/8/22   Irwin Parrish MD Les   DULoxetine (CYMBALTA) 20 MG extended release capsule Take 1 capsule by mouth daily 4/8/22   Azam Whitmore MD   meloxicam (MOBIC) 7.5 MG tablet Take 1 tablet by mouth 2 times daily as needed for Pain 8/19/21 10/30/21  MASSIEL Alejandro - CNP       REVIEW OF SYSTEMS    (2-9 systems for level 4, 10 or more for level 5)      Review of Systems   Constitutional: Negative for chills and fever. Eyes: Negative for discharge and redness. Respiratory: Negative for shortness of breath. Cardiovascular: Negative for chest pain. Gastrointestinal: Negative for abdominal pain. Genitourinary: Negative for dysuria. Musculoskeletal: Negative for arthralgias. Skin: Negative for color change and rash. Neurological: Negative for headaches. Psychiatric/Behavioral: Negative for agitation and confusion. PHYSICAL EXAM   (up to 7 for level 4, 8 or more for level 5)     INITIAL VITALS:    temperature is 98.4 °F (36.9 °C). His blood pressure is 130/116 (abnormal) and his pulse is 73. His respiration is 20 and oxygen saturation is 97%. Physical Exam  Vitals and nursing note reviewed. Constitutional:       Appearance: He is well-developed. HENT:      Head: Normocephalic and atraumatic. Nose: Nose normal.      Mouth/Throat:      Mouth: Mucous membranes are moist.   Eyes:      General: No scleral icterus. Conjunctiva/sclera: Conjunctivae normal.      Pupils: Pupils are equal, round, and reactive to light. Cardiovascular:      Rate and Rhythm: Normal rate and regular rhythm. Heart sounds: Normal heart sounds. No murmur heard. No friction rub. No gallop. Pulmonary:      Effort: Pulmonary effort is normal. No respiratory distress. Breath sounds: Normal breath sounds. No wheezing or rales. Abdominal:      Palpations: Abdomen is soft. Tenderness: There is no abdominal tenderness. Musculoskeletal:         General: Normal range of motion.    Skin: General: Skin is warm and dry. Findings: No erythema or rash. Neurological:      Mental Status: He is alert and oriented to person, place, and time. Psychiatric:      Comments: Flat affect but otherwise linear thought process, does not appear to responding to internal stimuli         DIFFERENTIAL  DIAGNOSIS     PLAN (LABS / IMAGING / EKG):  No orders of the defined types were placed in this encounter. MEDICATIONS ORDERED:  Orders Placed This Encounter   Medications    hydrocortisone 1 % cream     Sig: Apply topically 2 -3 times daily for 5 - 7 days to back. Do not apply to face. Dispense:  120 g     Refill:  0       DDX: Anxiety versus benzodiazepine seeking behavior versus atopic dermatitis    Initial MDM/Plan: 32 y.o. male who presents with reported anxiety. Discussed with patient that he has had multiple providers multiple different benzodiazepines and does not appear to be prescribed this chronically. Recent note from [facility] psychiatrist recommended against benzodiazepines as there is concern for abuse. Discussed with him he needs to follow-up with his psychiatrist on Monday which he states he has an appointment for for any refills. He then begins bargaining for IM Benadryl as his back is itching. Would offer hydrocortisone cream or oral Benadryl. Patient then declines either of these and eloped from the emergency department. DIAGNOSTIC RESULTS / EMERGENCY DEPARTMENT COURSE / MDM     LABS:  Labs Reviewed - No data to display      RADIOLOGY:  No results found. EMERGENCY DEPARTMENT COURSE:  Patient eloped from emergency department    PROCEDURES:  None    CONSULTS:  None    CRITICAL CARE:  0    FINAL IMPRESSION      1. Anxiety state    2. Drug-seeking behavior    3. Eloped from emergency department          DISPOSITION / [address] - Left Before Treatment Complete 04/23/2022 08:52:09 PM        PATIENTREFERRED TO:  No follow-up provider specified.     DISCHARGE

## 2022-04-26 ENCOUNTER — HOSPITAL ENCOUNTER (EMERGENCY)
Age: 32
Discharge: HOME | End: 2022-04-26
Attending: EMERGENCY MEDICINE
Payer: COMMERCIAL

## 2022-04-26 ENCOUNTER — HOSPITAL ENCOUNTER (EMERGENCY)
Age: 32
Discharge: LEFT BEFORE DISCHARGE | End: 2022-04-26

## 2022-04-26 VITALS
OXYGEN SATURATION: 100 % | HEART RATE: 72 BPM | SYSTOLIC BLOOD PRESSURE: 160 MMHG | RESPIRATION RATE: 16 BRPM | WEIGHT: 220 LBS | DIASTOLIC BLOOD PRESSURE: 95 MMHG | BODY MASS INDEX: 32.58 KG/M2 | TEMPERATURE: 97.6 F | HEIGHT: 69 IN

## 2022-04-26 VITALS
BODY MASS INDEX: 32.58 KG/M2 | OXYGEN SATURATION: 100 % | SYSTOLIC BLOOD PRESSURE: 148 MMHG | HEART RATE: 70 BPM | HEIGHT: 69 IN | RESPIRATION RATE: 18 BRPM | DIASTOLIC BLOOD PRESSURE: 90 MMHG | WEIGHT: 220 LBS | TEMPERATURE: 98.2 F
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
## 2022-04-26 PROCEDURE — 99283 EMERGENCY DEPT VISIT LOW MDM: CPT

## 2022-04-26 RX ORDER — PROMETHAZINE HYDROCHLORIDE 25 MG/1
25 TABLET ORAL EVERY 8 HOURS PRN
Qty: 10 TABLET | Refills: 0 | Status: SHIPPED | OUTPATIENT
Start: 2022-04-26 | End: 2022-05-01

## 2022-04-26 RX ORDER — AMOXICILLIN AND CLAVULANATE POTASSIUM 875; 125 MG/1; MG/1
1 TABLET, FILM COATED ORAL 2 TIMES DAILY
Qty: 20 TABLET | Refills: 0 | Status: SHIPPED | OUTPATIENT
Start: 2022-04-26 | End: 2022-05-06

## 2022-04-26 NOTE — ED TRIAGE NOTES
Mode of arrival (squad #, walk in, police, etc) : walk in        Chief complaint(s): wants started on depression medications        Arrival Note (brief scenario, treatment PTA, etc). : Pt states he was at four hospitals yesterday and noone started him on depression medications. Pt states he lives with his brother but cannot right now until he gets medications started. Pt denies SI or HI. Pt is A&Ox4, eupneic, PWD. GCS=15. Call light in reach. C= \"Have you ever felt that you should Cut down on your drinking? \"  No  A= \"Have people Annoyed you by criticizing your drinking? \"  No  G= \"Have you ever felt bad or Guilty about your drinking? \"  No  E= \"Have you ever had a drink as an Eye-opener first thing in the morning to steady your nerves or to help a hangover? \"  No      Deferred []      Reason for deferring: N/A    *If yes to two or more: probable alcohol abuse. *
[place]

## 2022-04-27 NOTE — ED PROVIDER NOTES
[address] [place] [address] ED  [facility]      Pt Name: Any Degroot  MRN: [id number]  [place] 1990  Date ofevaluation: 4/26/2022  Provider: Kira Tirado Dr       Chief Complaint   Patient presents with    Fatigue         HISTORY OF PRESENT ILLNESS  (Location/Symptom, Timing/Onset, Context/Setting, Quality, Duration, Modifying Factors, Severity.)   Any Degroot is a 32 y.o. male who presents to the emergency department with dog bite. Patient states he was bit by dog to the left knee. He is concerned to have an infection of his left hand due to Stiffness of the last week. Denies any chest pain shortness of breath. He is requesting IVs also intramuscular Benadryl for perceived rash. He feels that he is dehydrated but has no tachycardia or low blood pressure. Has no history of nausea or vomiting. Mucous membranes are moist.    Nursing Notes were reviewed. ALLERGIES     Dicyclomine, Famotidine, Geodon [ziprasidone hcl], Haloperidol, Iv dye [iodides], Reglan [metoclopramide], Ibuprofen, Aspirin, Dicyclomine hcl, Hydroxyzine hcl, Iodine, Metronidazole, Mirtazapine, Promethazine, and Ziprasidone    CURRENT MEDICATIONS       Discharge Medication List as of 4/26/2022 10:29 PM      CONTINUE these medications which have NOT CHANGED    Details   hydrocortisone 1 % cream Apply topically 2 -3 times daily for 5 - 7 days to back.  Do not apply to face., Disp-120 g, R-0, Print      aluminum-magnesium hydroxide 200-200 MG/5ML suspension Take 10 mLs by mouth every 6 hours as needed for Indigestion, Disp-710 mL, R-mlPrint      traZODone (DESYREL) 50 MG tablet Take 1 tablet by mouth nightly as needed for Sleep, Disp-30 tablet, R-0Normal      DULoxetine (CYMBALTA) 20 MG extended release capsule Take 1 capsule by mouth daily, Disp-30 capsule, R-0Normal             PAST MEDICAL HISTORY         Diagnosis Date    Anxiety     Arthritis     Asthma     Bipolar I disorder, most recent episode (or current) depressed, unspecified 9/12/2014    Clostridium difficile infection     COPD (chronic obstructive pulmonary disease) (HCC)     Depression     Disease of blood and blood forming organ     Eczema     Fracture, metacarpal     R 4th and 5th    Gastric ulcer     Gastritis 06/13/2018    GERD (gastroesophageal reflux disease)     GI bleed     H. 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[address]     Substance abuse ([address]     Hx of opiates, benzos, cocaine, alcohol abuse    Suicidal ideation     Suicide attempt by hanging ([address]     Tobacco dependence     Ulcerative colitis ([address]     UTI (urinary tract infection)        SURGICAL HISTORY           Procedure Laterality Date    ABDOMEN SURGERY      upper GI scope 7/7/2015    BRONCHOSCOPY      CHOLECYSTECTOMY, LAPAROSCOPIC  07/14/2017    surgery performed at [address] [place] [address], COLON, DIAGNOSTIC      OTHER SURGICAL HISTORY      lumbar puncture    [place] COLONOSCOPY W/BIOPSY SINGLE/MULTIPLE  8/9/2017    COLONOSCOPY WITH BIOPSY performed by Ramy Paula MD at [facility]    [place] EGD TRANSORAL BIOPSY SINGLE/MULTIPLE N/A 3/20/2017    EGD BIOPSY performed by Ricky Amezcua MD at [facility]    [place] ESOPHAGOGASTRODUODENOSCOPY TRANSORAL DIAGNOSTIC N/A 8/9/2017    EGD ESOPHAGOGASTRODUODENOSCOPY performed by Ramy Paula MD at [address] N/A 3/20/2017    SIGMOIDOSCOPY DIAGNOSTIC FLEXIBLE performed by Ricky Amezcua MD at [facility]  2/4/16    UPPER GASTROINTESTINAL ENDOSCOPY N/A 6/13/2018    GASTRITIS    UPPER GASTROINTESTINAL ENDOSCOPY N/A 9/20/2018    EGD BIOPSY performed by Shann Spurling, MD at [facility]           Problem Relation Age of Onset    Diabetes Father     Alcohol Abuse Father     Depression Father     Arthritis Father     High Blood Pressure Father     Other Father         aneurysm & epilepsy    Migraines Father     Arthritis Mother     Other Mother         aneurysm & epilepsy    Migraines Mother     Diabetes Brother         Aunt and uncles    Depression Brother     Mental Illness Brother     Other Brother         epilepsy    Migraines Brother     Stroke Other         Uncle    Other Brother         murdered Oct 6th, 2014    Colon Cancer Paternal Cousin 43    Other Sister         epilepsy    Migraines Sister      Family Status   Relation Name Status    Father  Alive    Mother  Alive    Brother  (Not Specified)    Other  (Not Specified)    Brother  (Not Specified)   [place]   (Not Specified)    Sister  (Not Specified)        SOCIAL HISTORY      reports that he has been smoking cigarettes. He has a 7.50 pack-year smoking history. He has never used smokeless tobacco. He reports current alcohol use. He reports previous drug use. Drugs: Opiates  and Cocaine. REVIEW OFSYSTEMS    (2-9 systems for level 4, 10 or more for level 5)   Review of Systems    Except as noted above the remainder of the review of systems was reviewed and negative. PHYSICAL EXAM    (up to 7 for level 4, 8 or more for level 5)     ED Triage Vitals [04/26/22 2129]   BP Temp Temp Source Pulse Resp SpO2 Height Weight   (!) 148/90 98.2 °F (36.8 °C) Oral 70 18 100 % 5' 9\" (1.753 m) 220 lb (99.8 kg)      Physical Exam  Constitutional:       Appearance: He is well-developed. HENT:      Head: Normocephalic and atraumatic. Cardiovascular:      Rate and Rhythm: Normal rate and regular rhythm.    Pulmonary:      Effort: Pulmonary effort is normal.      Breath sounds: Normal breath sounds. Abdominal:      Palpations: Abdomen is soft. Musculoskeletal:         General: Normal range of motion. Cervical back: Normal range of motion and neck supple. Legs:    Skin:     General: Skin is warm. Neurological:      Mental Status: He is alert and oriented to person, place, and time. Psychiatric:         Behavior: Behavior normal.                 DIAGNOSTIC RESULTS     EKG: All EKG's are interpreted by the Emergency Department Physician who either signs or Co-signs this chart in the absence of a cardiologist.        RADIOLOGY:   Non-plain film images such as CT, Ultrasound and MRI are read by the radiologist. Plain radiographic images arevisualized and preliminarily interpreted by the emergency physician with the below findings:        Interpretation per the Radiologist below, if available at thetime of this note:          ED BEDSIDE ULTRASOUND:   Performed by ED Physician - none    LABS:  Labs Reviewed - No data to display    All other labs were within normal range or not returned as of this dictation. EMERGENCY DEPARTMENT COURSE and DIFFERENTIAL DIAGNOSIS/MDM:   Vitals:    Vitals:    04/26/22 2129   BP: (!) 148/90   Pulse: 70   Resp: 18   Temp: 98.2 °F (36.8 °C)   TempSrc: Oral   SpO2: 100%   Weight: 220 lb (99.8 kg)   Height: 5' 9\" (1.753 m)     Patient did not cooperate with lab draw. He was pushing hand away at the phlebotomist and moving his arm. Phlebotomist could not safely obtain labs. Patient does not need IV fluid. No clinical indication. Will give Augmentin prescription nausea medication discharged home. Patient refused labs. Risk benefits were discussed at length. Patient is well-known to the ED and has frequent ER visits. CONSULTS:  None    PROCEDURES:  Procedures        FINAL IMPRESSION      1.  Dog bite, initial encounter          DISPOSITION/PLAN   DISPOSITION Decision To Discharge 04/26/2022 10:25:32 PM      PATIENTREFERRED TO:   No follow-up provider specified.     DISCHARGE MEDICATIONS:     Discharge Medication List as of 4/26/2022 10:29 PM      START taking these medications    Details   amoxicillin-clavulanate (AUGMENTIN) 875-125 MG per tablet Take 1 tablet by mouth 2 times daily for 10 days, Disp-20 tablet, R-0Print      promethazine (PHENERGAN) 25 MG tablet Take 1 tablet by mouth every 8 hours as needed for Nausea, Disp-10 tablet, R-0Print                 (Please note that portions of this note were completed with a voice recognition program.  Efforts were made to edit thedictations but occasionally words are mis-transcribed.)    RACQUEL Byers PA-C  04/26/22 [address] [place] [address], RACQUEL  04/26/22 [id number]

## 2022-04-27 NOTE — ED NOTES
Pt arrived to ed with c/o concern for infection from dog bite. Pt is intermittently cooperative and answering questions. Pt afebrile at this time. No active drainage noted. Will continue to monitor.       Kerwin Rogel RN  04/27/22 0234

## 2022-04-29 NOTE — ED PROVIDER NOTES
eMERGENCY dEPARTMENT eNCOUnter   Independent Attestation     Pt Name: Luiz Varela  MRN: [id number]  [place] 1990  Date of evaluation: 4/29/22     Luiz Varela is a 32 y.o. male with CC: Fatigue        This visit was performed by both a physician and an APC. I performed all aspects of the MDM as documented. The care is provided during an unprecedented national emergency due to the novel coronavirus, COVID 19.     Lencho Kaplan MD  Attending Emergency Physician            Lencho Kaplan MD  04/29/22 6263

## 2022-04-30 ENCOUNTER — HOSPITAL ENCOUNTER (EMERGENCY)
Age: 32
Discharge: HOME HEALTH | End: 2022-04-30
Attending: EMERGENCY MEDICINE
Payer: COMMERCIAL

## 2022-04-30 VITALS
RESPIRATION RATE: 12 BRPM | SYSTOLIC BLOOD PRESSURE: 151 MMHG | DIASTOLIC BLOOD PRESSURE: 95 MMHG | TEMPERATURE: 99.5 F | HEART RATE: 98 BPM | OXYGEN SATURATION: 100 %

## 2022-04-30 DIAGNOSIS — Z53.21 ELOPED FROM EMERGENCY DEPARTMENT: Primary | ICD-10-CM

## 2022-04-30 PROCEDURE — 99283 EMERGENCY DEPT VISIT LOW MDM: CPT

## 2022-04-30 PROCEDURE — 99281 EMR DPT VST MAYX REQ PHY/QHP: CPT

## 2022-04-30 RX ORDER — DIPHENHYDRAMINE HCL 25 MG
50 TABLET ORAL ONCE
Status: DISCONTINUED | OUTPATIENT
Start: 2022-04-30 | End: 2022-04-30 | Stop reason: HOSPADM

## 2022-04-30 RX ORDER — PROMETHAZINE HYDROCHLORIDE 25 MG/1
25 TABLET ORAL ONCE
Status: DISCONTINUED | OUTPATIENT
Start: 2022-04-30 | End: 2022-04-30 | Stop reason: HOSPADM

## 2022-04-30 ASSESSMENT — ENCOUNTER SYMPTOMS
SHORTNESS OF BREATH: 0
ABDOMINAL PAIN: 0
COUGH: 0
NAUSEA: 1
CONSTIPATION: 0
VOMITING: 0
DIARRHEA: 0

## 2022-04-30 NOTE — ED PROVIDER NOTES
[facility] ED  Emergency Department Encounter  Emergency Medicine Resident     Pt Name: Lieutenant Obrien  GJV:[id number]  [place] 1990  Date of evaluation: 4/30/22  PCP:  None Provider    CHIEF COMPLAINT       Chief Complaint   Patient presents with    Anxiety    Psychiatric Evaluation     HISTORY OF PRESENT ILLNESS  (Location/Symptom, Timing/Onset, Context/Setting, Quality, Duration, ModifyingFactors, Severity.)      Lieutenant Obrien is a 32 y.o. male with PMH of diabetes, schizoaffective disorder, polysubstance abuse who presents for evaluation of anxiety and nausea. Patient reports that he is anxious and out of his alprazolam.  States he normally has since followed by [facility] has not seen them for months. Requesting follow-up presently on prescription. Also requesting Benadryl IV and Phenergan are IM for nausea. States that Zofran and p.o. Phenergan do not work for him. Denies fever, chest pain, shortness of breath, abdominal pain, vomiting, diarrhea, constipation, blood in stool, urinary symptoms. Tolerating p.o. at home. No SI/HI or hallucinations. Review of records shows patient seen at [address] [place] [address] for opiate overdose today. When asked about this patient states that they thought he was using opiates because he was tired. States that he has not been sleeping well which is why he needs IM Benadryl injection. Denies opiate use.     PAST MEDICAL / SURGICAL / SOCIAL /FAMILY HISTORY      has a past medical history of Anxiety, Arthritis, Asthma, Bipolar I disorder, most recent episode (or current) depressed, unspecified, Clostridium difficile infection, COPD (chronic obstructive pulmonary disease) ([address] Depression, Disease of blood and blood forming organ, Eczema, Fracture, metacarpal, Gastric ulcer, Gastritis, GERD (gastroesophageal reflux disease), GI bleed, H. pylori infection, H/O blood clots, Head injury, Headache, Insomnia, Juvenile rheumatoid arthritis ([address] Neuromuscular disorder (HCC), PFAPA syndrome ([facility] PUD (peptic ulcer disease), Rheumatoid arthritis ([facility] Rheumatoid arthritis(714.0), Severe recurrent major depression without psychotic features ([facility] Sleep apnea, 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[facility] Substance abuse ([facility] Suicidal ideation, Suicide attempt by hanging ([facility] Tobacco dependence, Ulcerative colitis ([facility] and UTI (urinary tract infection). No other pertinent PMH on review with patient/guardian. has a past surgical history that includes Colonoscopy; bronchoscopy; other surgical history; Upper gastrointestinal endoscopy (2/4/16); pr egd transoral biopsy single/multiple (N/A, 3/20/2017); sigmoidoscopy (N/A, 3/20/2017); Cholecystectomy, laparoscopic (07/14/2017); pr esophagogastroduodenoscopy transoral diagnostic (N/A, 8/9/2017); pr colonoscopy w/biopsy single/multiple (8/9/2017); Abdomen surgery; Upper gastrointestinal endoscopy (N/A, 6/13/2018); Upper gastrointestinal endoscopy (N/A, 9/20/2018); and Endoscopy, colon, diagnostic. No other pertinent PSH on review with patient/guardian. Social History     Socioeconomic History    Marital status: Single     Spouse name: Not on file    Number of children: Not on file    Years of education: Not on file    Highest education level: Not on file   Occupational History    Occupation: disability   Tobacco Use    Smoking status: Current Every Day Smoker     Packs/day: 0.50     Years: 15.00     Pack years: 7.50     Types: Cigarettes    Smokeless tobacco: Never Used   Vaping Use    Vaping Use: Former   Substance and Sexual Activity    Alcohol use: Yes     Comment: drinks daily    Drug use: Not Currently     Types: Opiates , Cocaine     Comment: Hx of opiates, benzos, cocaine, alcohol abuse    Sexual activity: Yes     Partners: Female     Comment: Lives alone, not working.    Other Topics Concern    Not on file   Social History Narrative    ** Merged History Encounter **          Social Determinants of Health Financial Resource Strain: Medium Risk    Difficulty of Paying Living Expenses: Somewhat hard   Food Insecurity: No Food Insecurity    Worried About Running Out of Food in the Last Year: Never true    M[place] of Food in the Last Year: Never true   Transportation Needs: No Transportation Needs    Lack of Transportation (Medical): No    Lack of Transportation (Non-Medical): No   Physical Activity: Inactive    Days of Exercise per Week: 0 days    Minutes of Exercise per Session: 0 min   Stress: Stress Concern Present    Feeling of Stress : Rather much   Social Connections: Socially Isolated    Frequency of Communication with Friends and Family: Never    Frequency of Social Gatherings with Friends and Family: Never    Attends Mu-ism Services: Never    Active Member of Clubs or Organizations: No    Attends Club or Organization Meetings: Never    Marital Status: Never    Intimate Partner Violence: Not At Risk    Fear of Current or Ex-Partner: No    Emotionally Abused: No    Physically Abused: No    Sexually Abused: No   Housing Stability: Low Risk     Unable to Pay for Housing in the Last Year: No    Number of [place] in the Last Year: 1    Unstable Housing in the Last Year: No       I counseled the patient against using tobacco products.     Family History   Problem Relation Age of Onset    Diabetes Father     Alcohol Abuse Father     Depression Father     Arthritis Father     High Blood Pressure Father     Other Father         aneurysm & epilepsy    Migraines Father     Arthritis Mother     Other Mother         aneurysm & epilepsy    Migraines Mother     Diabetes Brother         Aunt and uncles    Depression Brother     Mental Illness Brother     Other Brother         epilepsy    Migraines Brother     Stroke Other         Uncle    Other Brother         murdered Oct 6th, 2014    Colon Cancer Paternal Cousin 43    Other Sister         epilepsy   Danielle Migraines Sister No other pertinent FamHx on review with patient/guardian. Allergies:  Dicyclomine, Famotidine, Geodon [ziprasidone hcl], Haloperidol, Iv dye [iodides], Reglan [metoclopramide], Ibuprofen, Aspirin, Dicyclomine hcl, Hydroxyzine hcl, Iodine, Metronidazole, Mirtazapine, Promethazine, and Ziprasidone    Home Medications:  Prior to Admission medications    Medication Sig Start Date End Date Taking? Authorizing Provider   amoxicillin-clavulanate (AUGMENTIN) 875-125 MG per tablet Take 1 tablet by mouth 2 times daily for 10 days 4/26/22 5/6/22  Geneva Herrmann PA-C   promethazine (PHENERGAN) 25 MG tablet Take 1 tablet by mouth every 8 hours as needed for Nausea 4/26/22 5/1/22  Geneva Herrmann PA-C   hydrocortisone 1 % cream Apply topically 2 -3 times daily for 5 - 7 days to back. Do not apply to face. 4/23/22 4/30/22  Jone Arzola,    aluminum-magnesium hydroxide 200-200 MG/5ML suspension Take 10 mLs by mouth every 6 hours as needed for Indigestion 4/22/22   Jone Arzola DO   traZODone (DESYREL) 50 MG tablet Take 1 tablet by mouth nightly as needed for Sleep 4/8/22   Josefina Cerna MD   DULoxetine (CYMBALTA) 20 MG extended release capsule Take 1 capsule by mouth daily 4/8/22   Josefina Cerna MD   meloxicam (MOBIC) 7.5 MG tablet Take 1 tablet by mouth 2 times daily as needed for Pain 8/19/21 10/30/21  Bhanu Ledezma APRN - CNP       REVIEW OF SYSTEMS    (2-9 systems for level 4, 10 ormore for level 5)      Review of Systems   Constitutional: Negative for fever. Eyes: Negative for visual disturbance. Respiratory: Negative for cough and shortness of breath. Cardiovascular: Negative for chest pain. Gastrointestinal: Positive for nausea. Negative for abdominal pain, constipation, diarrhea and vomiting. Skin: Negative for rash. Allergic/Immunologic: Negative for immunocompromised state. Neurological: Negative for headaches.    Hematological: Does not bruise/bleed easily. Psychiatric/Behavioral: Negative for agitation, hallucinations, self-injury and suicidal ideas. The patient is nervous/anxious. PHYSICAL EXAM   (up to 7 for level 4, 8 or more for level 5)      INITIAL VITALS:   BP (!) 151/95   Pulse 98   Temp 99.5 °F (37.5 °C) (Oral)   Resp 12   SpO2 100%     Physical Exam  Constitutional:       General: He is not in acute distress. Appearance: Normal appearance. He is not ill-appearing, toxic-appearing or diaphoretic. HENT:      Head: Normocephalic and atraumatic. Right Ear: External ear normal.      Left Ear: External ear normal.   Eyes:      General:         Right eye: No discharge. Left eye: No discharge. Cardiovascular:      Rate and Rhythm: Normal rate and regular rhythm. Pulses: Normal pulses. Heart sounds: No murmur heard. Pulmonary:      Effort: Pulmonary effort is normal. No respiratory distress. Breath sounds: Normal breath sounds. No wheezing, rhonchi or rales. Abdominal:      Palpations: Abdomen is soft. Tenderness: There is no abdominal tenderness. There is no right CVA tenderness, left CVA tenderness, guarding or rebound. Skin:     Capillary Refill: Capillary refill takes less than 2 seconds. Neurological:      General: No focal deficit present. Mental Status: He is alert. DIFFERENTIAL  DIAGNOSIS     PLAN (LABS / IMAGING / EKG):  No orders of the defined types were placed in this encounter. MEDICATIONS ORDERED:  Orders Placed This Encounter   Medications    diphenhydrAMINE (BENADRYL) tablet 50 mg    promethazine (PHENERGAN) tablet 25 mg       DIAGNOSTIC RESULTS / EMERGENCY DEPARTMENT COURSE / MDM     LABS:  No results found for this visit on 04/30/22. IMPRESSION/MDM/ED COURSE:  32 y.o. male presented with anxiety and nausea. Patient recorded as tachycardic in triage however HR in 90s on my exam.  Slightly hypertensive at 151/95. Vitals otherwise WNL.   On exam patient resting with no acute distress, nontoxic-appearing. Heart RRR, lungs clear. Abdomen soft and nontender. No CVA tenderness. Given the patient is no fever, abdominal pain, abdominal tenderness do not feel lab work/imaging is necessary at this time. Pt requesting alprazolam, IV Benadryl, and IM Phenergan. Patient informed that we do not prescribe alprazolam from the ED however social work will see patient to discuss outpatient follow-up options and contact information for [facility]. Will give p.o. and IV Benadryl then p.o. challenge and reevaluate    ED Course as of 04/30/22 1655   Sat Apr 30, 2022   1654 Patient seen and evaluated by social work who provide contact information for [facility]. Patient refusing p.o. medications and eloped from the ED. [AF]      ED Course User Index  [AF] Haritha Lui DO     RADIOLOGY:  No orders to display       EKG  None    All EKG's are interpreted by the Emergency Department Physician who either signs or Co-signs this chart in the absence of a cardiologist.    PROCEDURES:  None    CONSULTS:  None    FINAL IMPRESSION      1. Eloped from emergency department          DISPOSITION / [address] - Left Before Treatment Complete 04/30/2022 04:50:04 PM      PATIENT REFERREDTO:  No follow-up provider specified.     DISCHARGE MEDICATIONS:  Discharge Medication List as of 4/30/2022  3:15 PM          Opal Markham DO  PGY 2  Resident Physician Emergency Medicine  04/30/22 4:55 PM    (Please note that portions of this note were completed with a voice recognition program.Efforts were made to edit the dictations but occasionally words are mis-transcribed.)       Haritha Lui DO  Resident  04/30/22 [phone]

## 2022-04-30 NOTE — ED NOTES
Writer called for patient to take him back to room 9. Patient not available in the lobby. Writer found the patient outside the ED doors pacing the length of the building back and fourth despite chief complaint of leg pain.  Patient initially refused to come back due to smoking cigarette     Ciara Portillo RN  04/30/22 7132

## 2022-04-30 NOTE — ED PROVIDER NOTES
[address] [facility]     Emergency Department     Faculty Note/ Attestation      Pt Name: Nataly Thomas                                       MRN: [id number]  [place] 1990  Date of evaluation: 4/30/2022    Patients PCP:    None Provider    Attestation  I performed a history and physical examination of the patient and discussed management with the resident. I reviewed the residents note and agree with the documented findings and plan of care. Any areas of disagreement are noted on the chart. I was personally present for the key portions of any procedures. I have documented in the chart those procedures where I was not present during the key portions. I have reviewed the emergency nurses triage note. I agree with the chief complaint, past medical history, past surgical history, allergies, medications, social and family history as documented unless otherwise noted below. For Physician Assistant/ Nurse Practitioner cases/documentation I have personally evaluated this patient and have completed at least one if not all key elements of the E/M (history, physical exam, and MDM). Additional findings are as noted.     Initial Screens:        [place] Coma Scale  Eye Opening: Spontaneous  Best Verbal Response: Oriented  Best Motor Response: Obeys commands  [place] Coma Scale Score: 15    Vitals:    Vitals:    04/30/22 1409 04/30/22 1441   BP: (!) 151/95    Pulse: 117 98   Resp: 12    Temp: 99.5 °F (37.5 °C)    TempSrc: Oral    SpO2: 100%        CHIEF COMPLAINT       Chief Complaint   Patient presents with    Anxiety    Psychiatric Evaluation       Patient 77-year-old male still out of his alprazolam medication wanting refill also asking for IV promethazine and diphenhydramine for nausea patient has no pain at this time just itching patient notes he cannot hold anything down was offered tablets to help disintegrate patient declining these at this time        EMERGENCY DEPARTMENT COURSE: -------------------------  BP: (!) 151/95, Temp: 99.5 °F (37.5 °C), Pulse: 98, Resp: 12  Physical Exam  Constitutional:       Appearance: He is well-developed. He is not diaphoretic. HENT:      Head: Normocephalic and atraumatic. Right Ear: External ear normal.      Left Ear: External ear normal.   Eyes:      General: No scleral icterus. Right eye: No discharge. Left eye: No discharge. Neck:      Trachea: No tracheal deviation. Pulmonary:      Effort: Pulmonary effort is normal. No respiratory distress. Breath sounds: No stridor. Musculoskeletal:         General: Normal range of motion. Cervical back: Normal range of motion. Skin:     General: Skin is warm and dry. Neurological:      Mental Status: He is alert. Coordination: Coordination normal.   Psychiatric:         Behavior: Behavior normal.           Comments  Patient medically cleared informed that he needs to follow-up with his regular physician for his chronic medications especially benzodiazepines         Carry DO Vicente,, , RDMS.   Attending Emergency Physician          Carry DO Vicente  04/30/22 5797

## 2022-04-30 NOTE — ED NOTES
Writer met with patient at bedside to provide community resources. Patient states he has been feeling stressed recently as a result of relationship stressors. Patient denies thoughts of harm towards self or others. He reports that he should be following up with [facility] but that he does not have an appointment. Writer provided patient with information regarding walk in times for [facility]. Patient also provided [facility] information. He reports no further needs.       BARBARA Hinojosa  04/30/22 2199

## 2022-04-30 NOTE — ED NOTES
Pt. Being very manipulative, tells writer that the physician stated specifically to [facility] sure the RN has the medication in a shot,\"  Writer explains the way the medication was ordered and intended to be given  Pt.  Refusing medication   Dr. Rina Ronquillo aware     Tha Raphael, RN  04/30/22 8888

## 2022-04-30 NOTE — ED NOTES
Pt. States he is leaving, pt verbalizes he understands he is leaving [place]  Will notify physician     Emily Ford RN  04/30/22 5261

## 2022-04-30 NOTE — ED NOTES
Pt. To ER room 9 from triage, ambulatory with steady gait   Pt. Presents with anxiety, states he has not slept in 4 days and has been homeless for the last 4 days as well  Pt. Denies drug use today, however cannot stay awake while sitting, is nodding off during exam, and has EMS patches and an empty cap for narcan at bedside  Pt. States EMS did think he overdosed today when they evaluated him earlier, but pt states he is just drowsy  Pt.  Placed on continuous pulse ox on monitor  Awaiting orders  Will continue to monitor        Carlos Jenkins RN  04/30/22 2346

## 2022-05-01 ENCOUNTER — HOSPITAL ENCOUNTER (EMERGENCY)
Age: 32
Discharge: HOME | End: 2022-05-01
Attending: EMERGENCY MEDICINE
Payer: COMMERCIAL

## 2022-05-01 VITALS
TEMPERATURE: 98.4 F | HEIGHT: 69 IN | BODY MASS INDEX: 32.58 KG/M2 | RESPIRATION RATE: 18 BRPM | HEART RATE: 80 BPM | SYSTOLIC BLOOD PRESSURE: 125 MMHG | OXYGEN SATURATION: 94 % | WEIGHT: 220 LBS | DIASTOLIC BLOOD PRESSURE: 78 MMHG

## 2022-05-01 VITALS
DIASTOLIC BLOOD PRESSURE: 80 MMHG | SYSTOLIC BLOOD PRESSURE: 138 MMHG | TEMPERATURE: 98.1 F | OXYGEN SATURATION: 96 % | HEART RATE: 93 BPM | RESPIRATION RATE: 18 BRPM

## 2022-05-01 DIAGNOSIS — F41.1 ANXIETY STATE: ICD-10-CM

## 2022-05-01 DIAGNOSIS — Z76.5 DRUG-SEEKING BEHAVIOR: Primary | ICD-10-CM

## 2022-05-01 DIAGNOSIS — Z76.0 ENCOUNTER FOR MEDICATION REFILL: Primary | ICD-10-CM

## 2022-05-01 PROCEDURE — 99283 EMERGENCY DEPT VISIT LOW MDM: CPT

## 2022-05-01 RX ORDER — ONDANSETRON 4 MG/1
4 TABLET, ORALLY DISINTEGRATING ORAL ONCE
Status: DISCONTINUED | OUTPATIENT
Start: 2022-05-01 | End: 2022-05-01 | Stop reason: HOSPADM

## 2022-05-01 RX ORDER — ONDANSETRON 4 MG/1
4 TABLET, ORALLY DISINTEGRATING ORAL EVERY 8 HOURS PRN
Qty: 5 TABLET | Refills: 0 | Status: ON HOLD | OUTPATIENT
Start: 2022-05-01 | End: 2022-05-30 | Stop reason: HOSPADM

## 2022-05-01 RX ORDER — DIPHENHYDRAMINE HCL 25 MG
25 TABLET ORAL ONCE
Status: DISCONTINUED | OUTPATIENT
Start: 2022-05-01 | End: 2022-05-01 | Stop reason: HOSPADM

## 2022-05-01 RX ORDER — DIPHENHYDRAMINE HCL 25 MG
25 CAPSULE ORAL NIGHTLY PRN
Qty: 4 CAPSULE | Refills: 0 | Status: SHIPPED | OUTPATIENT
Start: 2022-05-01 | End: 2022-05-26

## 2022-05-01 ASSESSMENT — ENCOUNTER SYMPTOMS
COUGH: 0
VOMITING: 0
ROS SKIN COMMENTS: PRURITUS
NAUSEA: 1
SHORTNESS OF BREATH: 0
BACK PAIN: 0
ABDOMINAL PAIN: 0

## 2022-05-01 ASSESSMENT — PAIN - FUNCTIONAL ASSESSMENT: PAIN_FUNCTIONAL_ASSESSMENT: 0-10

## 2022-05-01 ASSESSMENT — PAIN SCALES - GENERAL: PAINLEVEL_OUTOF10: 7

## 2022-05-01 ASSESSMENT — PAIN DESCRIPTION - DESCRIPTORS: DESCRIPTORS: ACHING

## 2022-05-01 ASSESSMENT — PAIN DESCRIPTION - FREQUENCY: FREQUENCY: CONTINUOUS

## 2022-05-01 NOTE — ED TRIAGE NOTES
Mode of arrival (squad #, walk in, police, etc) : walk-in     Chief complaint(s): Anxiety    Arrival Note (brief scenario, treatment PTA, etc). : Pt presents to ED with c/o anxiety. Pt denies any changes that exacerbated the anxiety. Pt denies suicidal and homicidal ideation. Pt denies drinking or drug use today. Pt speech is slightly slurred, appears to be baseline per previous encounter notation. No acute signs of distress noted at this time. C= \"Have you ever felt that you should Cut down on your drinking? \"  Yes  A= \"Have people Annoyed you by criticizing your drinking? \"  No  G= \"Have you ever felt bad or Guilty about your drinking? \" Yes  E= \"Have you ever had a drink as an Eye-opener first thing in the morning to steady your nerves or to help a hangover? \"  Yes     Deferred []        *If yes to two or more: probable alcohol abuse. *

## 2022-05-01 NOTE — ED NOTES
upon d/c pt is alert, oriented, ambulatory, and free of distress. Writer RN educated pt on follow-up care with [facility]. RN instructed pt on narcan administration including product assembly. RN educated pt on safety of administering narcan to people suspected of overdose without confirmation of opiate use. Opportunities for questions offered, no further needs at this time.         Erick Rowe RN  05/01/22 8208

## 2022-05-01 NOTE — ED PROVIDER NOTES
[address] [facility]     Emergency Department     Faculty Attestation    I performed a history and physical examination of the patient and discussed management with the resident. I reviewed the residents note and agree with the documented findings and plan of care. Any areas of disagreement are noted on the chart. I was personally present for the key portions of any procedures. I have documented in the chart those procedures where I was not present during the key portions. I have reviewed the emergency nurses triage note. I agree with the chief complaint, past medical history, past surgical history, allergies, medications, social and family history as documented unless otherwise noted below. For Physician Assistant/ Nurse Practitioner cases/documentation I have personally evaluated this patient and have completed at least one if not all key elements of the E/M (history, physical exam, and MDM). Additional findings are as noted. I have personally seen and evaluated the patient. I find the patient's history and physical exam are consistent with the NP/PA documentation. I agree with the care provided, treatment rendered, disposition and follow-up plan. This patient was evaluated in the Emergency Department for symptoms described in the history of present illness. The patient was evaluated in the context of the global COVID-19 pandemic, which necessitated consideration that the patient might be at risk for infection with the SARS-CoV-2 virus that causes COVID-19. Institutional protocols and algorithms that pertain to the evaluation of patients at risk for COVID-19 are in a state of rapid change based on information released by regulatory bodies including the CDC and federal and state organizations. These policies and algorithms were followed during the patient's care in the ED. 80-year-old male presenting with anxiety and itching. States that he is out of medications.   Tried oral Benadryl at home earlier today. Broke out in a rash on his chest.  No new lotions or exposures. No difficulty breathing or cough. Exam:  General: Laying on the bed, awake, alert and in no acute distress  CV: normal rate and regular rhythm  Lungs: Breathing comfortably on room air with no tachypnea, hypoxia, or increased work of breathing  Skin: Raised papular rash on the torso with no urticaria, no intertriginous lesions    Plan:  Irritant dermatitis, will treat with Benadryl orally. Patient unable to take multiple antianxiety medications including allergy to Atarax.   Patient has concerning findings in PDMP report, will not prescribe controlled substances at this time    Yeyo Givens MD   Attending Emergency  Physician    (Please note that portions of this note were completed with a voice recognition program. Efforts were made to edit the dictations but occasionally words are mis-transcribed.)              Iris Monterroso MD  05/01/22 4607

## 2022-05-01 NOTE — ED PROVIDER NOTES
[facility] EMERGENCY DEPARTMENT ENCOUNTER    Pt Name: Estela French  MRN: [id number]  [place] 1990  Date of evaluation: 5/1/22  CHIEF COMPLAINT       Chief Complaint   Patient presents with    Anxiety     HISTORY OF PRESENT ILLNESS     Mental Health Problem  Presenting symptoms: agitation    Degree of incapacity (severity):  Mild  Onset quality:  Gradual  Timing:  Intermittent  Chronicity:  Recurrent  Context: not medication    Relieved by: Anti-anxiety medications  Risk factors: hx of mental illness      Patient denies any suicidal or homicidal ideation but notes that he ran out of his anxiety medication cannot take any of his medication as he had an episode of vomiting. REVIEW OF SYSTEMS     Review of Systems   Psychiatric/Behavioral: Positive for agitation. All other systems reviewed and are negative.     PASTMEDICAL HISTORY     Past Medical History:   Diagnosis Date    Anxiety     Arthritis     Asthma     Bipolar I disorder, most recent episode (or current) depressed, unspecified 9/12/2014    Clostridium difficile infection     COPD (chronic obstructive pulmonary disease) (HCC)     Depression     Disease of blood and blood forming organ     Eczema     Fracture, metacarpal     R 4th and 5th    Gastric ulcer     Gastritis 06/13/2018    GERD (gastroesophageal reflux disease)     GI bleed     H. 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[address]     Substance abuse ([address]     Hx of opiates, benzos, cocaine, alcohol abuse    Suicidal ideation     Suicide attempt by hanging ([address]     Tobacco dependence     Ulcerative colitis ([address]     UTI (urinary tract infection)      Past Problem List  Patient Active Problem List   Diagnosis Code    Opioid abuse ([facility] F11.10    Noncompliance Z91.19    Rectal bleeding K62.5    Smoker F17.200    Juvenile rheumatoid arthritis ([facility] M08.00    SRIRAM (obstructive sleep apnea) G47.33    Primary insomnia F51.01    Calculus of bile duct with acute on chronic cholecystitis K80.46    Mild intermittent asthma without complication J25.83    Gastritis K29.70    Generalized abdominal pain R10.84    Anemia D64.9    Elevated liver enzymes R74.8    Nausea and vomiting R11.2    Opioid type dependence, continuous (HCC) F11.20    Abdominal pain R10.9    Diarrhea R19.7    Irritable bowel syndrome with both constipation and diarrhea K58.2    Schizoaffective disorder, bipolar type ([address] F25.0    GI bleed K92.2    Depression with suicidal ideation F32. A, R45.851    Schizoaffective disorder ([facility] F25.9    MDD (major depressive disorder), recurrent severe, without psychosis ([address] F33.2    Severe episode of recurrent major depressive disorder, without psychotic features ([address] F33.2    Diabetes mellitus type 2 in obese ([address] E11.69, E66.9    Mixed hyperlipidemia E78.2     SURGICAL HISTORY       Past Surgical History:   Procedure Laterality Date    ABDOMEN SURGERY      upper GI scope 7/7/2015    BRONCHOSCOPY      CHOLECYSTECTOMY, LAPAROSCOPIC  07/14/2017    surgery performed at [address] [place] [address], COLON, DIAGNOSTIC      OTHER SURGICAL HISTORY      lumbar puncture    TX COLONOSCOPY W/BIOPSY SINGLE/MULTIPLE  8/9/2017    COLONOSCOPY WITH BIOPSY performed by Abdirizak Chacko MD at [facility]    TX EGD TRANSORAL BIOPSY SINGLE/MULTIPLE N/A 3/20/2017    EGD BIOPSY performed by Ana Luisa Cueva MD at [facility]    TX ESOPHAGOGASTRODUODENOSCOPY TRANSORAL DIAGNOSTIC N/A 8/9/2017    EGD ESOPHAGOGASTRODUODENOSCOPY performed by Abdirizak Chacko MD at [address] [place] [address] SIGMOIDOSCOPY N/A 3/20/2017    SIGMOIDOSCOPY DIAGNOSTIC FLEXIBLE performed by Lise Webber MD at [facility]  2/4/16    UPPER GASTROINTESTINAL ENDOSCOPY N/A 6/13/2018    GASTRITIS    UPPER GASTROINTESTINAL ENDOSCOPY N/A 9/20/2018    EGD BIOPSY performed by Brinda Stringer MD at [facility]       Previous Medications    ALUMINUM-MAGNESIUM HYDROXIDE 200-200 MG/5ML SUSPENSION    Take 10 mLs by mouth every 6 hours as needed for Indigestion    AMOXICILLIN-CLAVULANATE (AUGMENTIN) 875-125 MG PER TABLET    Take 1 tablet by mouth 2 times daily for 10 days    DIPHENHYDRAMINE (BENADRYL) 25 MG CAPSULE    Take 1 capsule by mouth nightly as needed for Itching    DULOXETINE (CYMBALTA) 20 MG EXTENDED RELEASE CAPSULE    Take 1 capsule by mouth daily    PROMETHAZINE (PHENERGAN) 25 MG TABLET    Take 1 tablet by mouth every 8 hours as needed for Nausea    TRAZODONE (DESYREL) 50 MG TABLET    Take 1 tablet by mouth nightly as needed for Sleep     ALLERGIES     is allergic to dicyclomine, famotidine, geodon [ziprasidone hcl], haloperidol, iv dye [iodides], reglan [metoclopramide], ibuprofen, aspirin, dicyclomine hcl, hydroxyzine hcl, iodine, metronidazole, mirtazapine, promethazine, and ziprasidone. FAMILY HISTORY     He indicated that his mother is alive. He indicated that his father is alive. He indicated that the status of his sister is unknown. He indicated that the status of his other is unknown. He indicated that the status of his paternal cousin is unknown.      SOCIAL HISTORY       Social History     Tobacco Use    Smoking status: Current Every Day Smoker     Packs/day: 0.50     Years: 15.00     Pack years: 7.50     Types: Cigarettes    Smokeless tobacco: Never Used   Vaping Use    Vaping Use: Former   Substance Use Topics    Alcohol use: Yes     Comment: drinks daily    Drug use: Not Currently     Types: Opiates , Cocaine     Comment: Hx of opiates, benzos, cocaine, alcohol abuse     PHYSICAL EXAM     INITIAL VITALS: /78   Pulse 80   Temp 98.4 °F (36.9 °C) (Oral)   Resp 18   Ht 5' 9\" (1.753 m)   Wt 220 lb (99.8 kg)   SpO2 94%   BMI 32.49 kg/m²    Physical Exam  Constitutional:       General: He is not in acute distress. Appearance: Normal appearance. He is well-developed. He is not diaphoretic. HENT:      Head: Normocephalic and atraumatic. Right Ear: External ear normal.      Left Ear: External ear normal.      Nose: Nose normal. No congestion. Mouth/Throat:      Mouth: Mucous membranes are moist.      Pharynx: Oropharynx is clear. Eyes:      General:         Right eye: No discharge. Left eye: No discharge. Conjunctiva/sclera: Conjunctivae normal.      Pupils: Pupils are equal, round, and reactive to light. Neck:      Trachea: No tracheal deviation. Cardiovascular:      Rate and Rhythm: Normal rate and regular rhythm. Pulses: Normal pulses. Heart sounds: Normal heart sounds. Pulmonary:      Effort: Pulmonary effort is normal. No respiratory distress. Breath sounds: Normal breath sounds. No stridor. No wheezing or rales. Abdominal:      Palpations: Abdomen is soft. Tenderness: There is no abdominal tenderness. There is no guarding or rebound. Musculoskeletal:         General: No tenderness or deformity. Normal range of motion. Cervical back: Normal range of motion and neck supple. Skin:     General: Skin is warm and dry. Capillary Refill: Capillary refill takes less than 2 seconds. Findings: No erythema or rash. Neurological:      General: No focal deficit present. Mental Status: He is alert and oriented to person, place, and time. Coordination: Coordination normal.   Psychiatric:         Mood and Affect: Mood normal.         Behavior: Behavior normal.         Thought Content:  Thought content normal.         Judgment: Judgment normal.         MEDICAL DECISION MAKING:     Patient was seen and examined at bedside soon after arrival to the emergency department. Chart was reviewed and history and physical examination was performed. Patient cannot have a refill for controlled medication as he appears to be falling asleep while talking. The patient will be given a dose of p.o. Benadryl per his request. Only 25 mg given considering the patient is falling asleep. Patient was able to tolerate after but did not take because he wanted IV benzodiazepines       CRITICAL CARE:       PROCEDURES:    Procedures    DIAGNOSTIC RESULTS   EKG:All EKG's are interpreted by the Emergency Department Physician who either signs or Co-signs this chart in the absence of a cardiologist.        RADIOLOGY:All plain film, CT, MRI, and formal ultrasound images (except ED bedside ultrasound) are read by the radiologist, see reports below, unless otherwisenoted in MDM or here. No orders to display     LABS: All lab results were reviewed by myself, and all abnormals are listed below. Labs Reviewed - No data to display    EMERGENCY DEPARTMENTCOURSE:         Vitals:    Vitals:    05/01/22 0335   BP: 125/78   Pulse: 80   Resp: 18   Temp: 98.4 °F (36.9 °C)   TempSrc: Oral   SpO2: 94%   Weight: 220 lb (99.8 kg)   Height: 5' 9\" (1.753 m)       The patient was given the following medications while in the emergency department:  Orders Placed This Encounter   Medications    ondansetron (ZOFRAN-ODT) disintegrating tablet 4 mg    diphenhydrAMINE (BENADRYL) tablet 25 mg    ondansetron (ZOFRAN ODT) 4 MG disintegrating tablet     Sig: Take 1 tablet by mouth every 8 hours as needed for Nausea or Vomiting     Dispense:  5 tablet     Refill:  0    NALOXONE OPIATE OVERDOSE KIT 1 each     CONSULTS:  None    FINAL IMPRESSION      1.  Encounter for medication refill          DISPOSITION/PLAN   DISPOSITION Decision To Discharge 05/01/2022 03:45:29 AM      PATIENT REFERRED TO:  [address] [place] [address] OFFICE  [facility] [phone]        DISCHARGE MEDICATIONS:  New Prescriptions    ONDANSETRON (ZOFRAN ODT) 4 MG DISINTEGRATING TABLET    Take 1 tablet by mouth every 8 hours as needed for Nausea or Vomiting     The care is provided during an unprecedented national emergency due to the novel coronavirus, COVID [facility], [facility] Mikey Higginbotham [facility], DO  05/01/22 0415

## 2022-05-05 ENCOUNTER — HOSPITAL ENCOUNTER (EMERGENCY)
Age: 32
Discharge: HOME | End: 2022-05-05
Attending: EMERGENCY MEDICINE

## 2022-05-05 ENCOUNTER — HOSPITAL ENCOUNTER (EMERGENCY)
Age: 32
Discharge: HOME | End: 2022-05-05
Attending: EMERGENCY MEDICINE
Payer: COMMERCIAL

## 2022-05-05 VITALS
DIASTOLIC BLOOD PRESSURE: 87 MMHG | OXYGEN SATURATION: 100 % | HEART RATE: 76 BPM | BODY MASS INDEX: 29.62 KG/M2 | TEMPERATURE: 98.1 F | HEIGHT: 69 IN | RESPIRATION RATE: 16 BRPM | WEIGHT: 200 LBS | SYSTOLIC BLOOD PRESSURE: 137 MMHG

## 2022-05-05 VITALS
SYSTOLIC BLOOD PRESSURE: 136 MMHG | DIASTOLIC BLOOD PRESSURE: 72 MMHG | OXYGEN SATURATION: 100 % | RESPIRATION RATE: 18 BRPM | HEART RATE: 82 BPM | TEMPERATURE: 97.3 F

## 2022-05-05 DIAGNOSIS — R10.84 GENERALIZED ABDOMINAL PAIN: Primary | ICD-10-CM

## 2022-05-05 DIAGNOSIS — R10.9 ABDOMINAL PAIN, UNSPECIFIED ABDOMINAL LOCATION: Primary | ICD-10-CM

## 2022-05-05 LAB
ABSOLUTE EOS #: 0.2 K/UL (ref 0–0.44)
ABSOLUTE IMMATURE GRANULOCYTE: <0.03 K/UL (ref 0–0.3)
ABSOLUTE LYMPH #: 2.58 K/UL (ref 1.1–3.7)
ABSOLUTE MONO #: 0.57 K/UL (ref 0.1–1.2)
ALBUMIN SERPL-MCNC: 4.1 G/DL (ref 3.5–5.2)
ALBUMIN/GLOBULIN RATIO: 1.4 (ref 1–2.5)
ALP BLD-CCNC: 63 U/L (ref 40–129)
ALT SERPL-CCNC: 17 U/L (ref 5–41)
ANION GAP SERPL CALCULATED.3IONS-SCNC: 7 MMOL/L (ref 9–17)
AST SERPL-CCNC: 28 U/L
BASOPHILS # BLD: 0 % (ref 0–2)
BASOPHILS ABSOLUTE: <0.03 K/UL (ref 0–0.2)
BILIRUB SERPL-MCNC: <0.1 MG/DL (ref 0.3–1.2)
BUN BLDV-MCNC: 8 MG/DL (ref 6–20)
CALCIUM SERPL-MCNC: 9 MG/DL (ref 8.6–10.4)
CHLORIDE BLD-SCNC: 100 MMOL/L (ref 98–107)
CO2: 30 MMOL/L (ref 20–31)
CREAT SERPL-MCNC: 0.8 MG/DL (ref 0.7–1.2)
EOSINOPHILS RELATIVE PERCENT: 3 % (ref 1–4)
GFR AFRICAN AMERICAN: >60 ML/MIN
GFR NON-AFRICAN AMERICAN: >60 ML/MIN
GFR SERPL CREATININE-BSD FRML MDRD: ABNORMAL ML/MIN/{1.73_M2}
GLUCOSE BLD-MCNC: 63 MG/DL (ref 70–99)
HCT VFR BLD CALC: 38.9 % (ref 40.7–50.3)
HEMOGLOBIN: 13 G/DL (ref 13–17)
IMMATURE GRANULOCYTES: 0 %
LIPASE: 40 U/L (ref 13–60)
LYMPHOCYTES # BLD: 39 % (ref 24–43)
MCH RBC QN AUTO: 29.8 PG (ref 25.2–33.5)
MCHC RBC AUTO-ENTMCNC: 33.4 G/DL (ref 28.4–34.8)
MCV RBC AUTO: 89.2 FL (ref 82.6–102.9)
MONOCYTES # BLD: 9 % (ref 3–12)
NRBC AUTOMATED: 0 PER 100 WBC
PDW BLD-RTO: 13.2 % (ref 11.8–14.4)
PLATELET # BLD: 239 K/UL (ref 138–453)
PMV BLD AUTO: 10.3 FL (ref 8.1–13.5)
POTASSIUM SERPL-SCNC: 3.6 MMOL/L (ref 3.7–5.3)
RBC # BLD: 4.36 M/UL (ref 4.21–5.77)
SEG NEUTROPHILS: 49 % (ref 36–65)
SEGMENTED NEUTROPHILS ABSOLUTE COUNT: 3.29 K/UL (ref 1.5–8.1)
SODIUM BLD-SCNC: 137 MMOL/L (ref 135–144)
TOTAL PROTEIN: 7 G/DL (ref 6.4–8.3)
WBC # BLD: 6.7 K/UL (ref 3.5–11.3)

## 2022-05-05 PROCEDURE — 99283 EMERGENCY DEPT VISIT LOW MDM: CPT

## 2022-05-05 PROCEDURE — 80053 COMPREHEN METABOLIC PANEL: CPT

## 2022-05-05 PROCEDURE — 83690 ASSAY OF LIPASE: CPT

## 2022-05-05 PROCEDURE — 85025 COMPLETE CBC W/AUTO DIFF WBC: CPT

## 2022-05-05 RX ORDER — POLYETHYLENE GLYCOL 3350 17 G/17G
17 POWDER, FOR SOLUTION ORAL ONCE
Status: DISCONTINUED | OUTPATIENT
Start: 2022-05-05 | End: 2022-05-05 | Stop reason: HOSPADM

## 2022-05-05 RX ORDER — PSYLLIUM HUSK/CALCIUM CARB 1 G-60 MG
1 CAPSULE ORAL DAILY
Qty: 120 CAPSULE | Refills: 0 | Status: ON HOLD | OUTPATIENT
Start: 2022-05-05 | End: 2022-05-30 | Stop reason: HOSPADM

## 2022-05-05 RX ORDER — POLYETHYLENE GLYCOL 3350 17 G/17G
17 POWDER, FOR SOLUTION ORAL DAILY PRN
Qty: 527 G | Refills: 1 | Status: SHIPPED | OUTPATIENT
Start: 2022-05-05 | End: 2022-06-04

## 2022-05-05 ASSESSMENT — PAIN DESCRIPTION - LOCATION: LOCATION: ABDOMEN;HEAD

## 2022-05-05 ASSESSMENT — PAIN - FUNCTIONAL ASSESSMENT: PAIN_FUNCTIONAL_ASSESSMENT: 0-10

## 2022-05-05 ASSESSMENT — PAIN SCALES - GENERAL: PAINLEVEL_OUTOF10: 10

## 2022-05-05 NOTE — ED NOTES
Patient did not even make it to his ED room stating he needed to use the restroom.   Pt then seen by ED triage staff and stated he was feeling better and was going to leave and go to a different hospital.     Junior Delong RN  05/05/22 2482

## 2022-05-05 NOTE — ED NOTES
The following labs labeled with pt sticker and tubed to lab:     [] Blue     [x] Lavender   [] on ice  [x] Green/yellow  [] Green/black [] on ice  [] Yellow  [] Red  [] Pink      [] COVID-19 swab    [] Rapid  [] PCR  [] Flu swab  [] Peds Viral Panel     [] Urine Sample  [] Pelvic Cultures  [] Blood Cultures            Kallie Golden RN  05/05/22 1100

## 2022-05-05 NOTE — ED PROVIDER NOTES
[facility]  Emergency Department  Faculty Attestation     I performed a history and physical examination of the patient and discussed management with the resident. I reviewed the residents note and agree with the documented findings and plan of care. Any areas of disagreement are noted on the chart. I was personally present for the key portions of any procedures. I have documented in the chart those procedures where I was not present during the key portions. I have reviewed the emergency nurses triage note. I agree with the chief complaint, past medical history, past surgical history, allergies, medications, social and family history as documented unless otherwise noted below. For Physician Assistant/ Nurse Practitioner cases/documentation I have personally evaluated this patient and have completed at least one if not all key elements of the E/M (history, physical exam, and MDM). Additional findings are as noted. Primary Care Physician:  None Provider    Screenings:  [unfilled]    CHIEF COMPLAINT       Chief Complaint   Patient presents with    Abdominal Pain       RECENT VITALS:   Temp: 97.3 °F (36.3 °C),  Pulse: 82, Resp: 18, BP: 136/72    LABS:  Labs Reviewed   CBC WITH AUTO DIFFERENTIAL - Abnormal; Notable for the following components:       Result Value    Hematocrit 38.9 (*)     All other components within normal limits   COMPREHENSIVE METABOLIC PANEL W/ REFLEX TO MG FOR LOW K - Abnormal; Notable for the following components:    Glucose 63 (*)     Potassium 3.6 (*)     Anion Gap 7 (*)     Total Bilirubin <0.10 (*)     All other components within normal limits   CULTURE, URINE   LIPASE   URINALYSIS WITH MICROSCOPIC       Radiology  No orders to display         Attending Physician Additional  Notes    Patient arrived in the ED several hours ago, then decided he did not need to be seen and went to the waiting room.   Security officers told him he should either leave or sign back in. He presents now saying that his body is not functioning right. He feels he is anxious though he appears calm. He states he has abdominal pain but is able to describe quality or duration. He has been constipated and last bowel movement was 1 week ago. He is not able to state how often he moves his bowels. He was seen at [facility] yesterday for the same complaints, abdominal series confirm excess stool, he was given MiraLAX which she has not yet used. At that point he complained of rectal bleeding. He denies this today. No nausea or vomiting. No chest pain or shortness of breath. He has chronic tingling in both feet from a chronic neuropathy of uncertain etiology. He states he is no longer on Suboxone. He denies homicidal or suicidal thoughts. He declines psychiatric evaluation at [facility]. On exam he has flat affect and appears comfortable vital signs are normal.  He does not appear anxious. Abdomen is soft and nontender. No distention rebound guarding mass or tympany. Skin is warm and dry. Normal pulses. Normal pupils, conjugate gaze, no nystagmus. Cranial nerves intact motor strength intact. Impression is constipation, schizoaffective disorder. Plan is laboratory studies, MiraLAX, anticipate discharge home on fiber and MiraLAX. Arlys Plants.  Mary Ruiz MD, [facility]  Attending Emergency  Physician               Neisha Watkins MD  05/05/22 3663

## 2022-05-05 NOTE — ED NOTES
Pt presents to the Ed with c/o abd pain and back pain. VSS. No distress noted. Call light within reach.       Antonio Gauthier RN  05/05/22 2851

## 2022-05-07 ENCOUNTER — HOSPITAL ENCOUNTER (EMERGENCY)
Age: 32
Discharge: LEFT BEFORE DISCHARGE | End: 2022-05-07
Attending: EMERGENCY MEDICINE
Payer: COMMERCIAL

## 2022-05-07 VITALS
OXYGEN SATURATION: 100 % | HEART RATE: 97 BPM | SYSTOLIC BLOOD PRESSURE: 130 MMHG | DIASTOLIC BLOOD PRESSURE: 77 MMHG | TEMPERATURE: 98.6 F | RESPIRATION RATE: 17 BRPM

## 2022-05-07 DIAGNOSIS — Z53.21 ELOPED FROM EMERGENCY DEPARTMENT: ICD-10-CM

## 2022-05-07 DIAGNOSIS — T50.901A ACCIDENTAL DRUG OVERDOSE, INITIAL ENCOUNTER: Primary | ICD-10-CM

## 2022-05-07 PROCEDURE — 99281 EMR DPT VST MAYX REQ PHY/QHP: CPT

## 2022-05-07 RX ORDER — ACETAMINOPHEN 500 MG
1000 TABLET ORAL ONCE
Status: DISCONTINUED | OUTPATIENT
Start: 2022-05-07 | End: 2022-05-07 | Stop reason: HOSPADM

## 2022-05-07 RX ORDER — ONDANSETRON 4 MG/1
4 TABLET, ORALLY DISINTEGRATING ORAL ONCE
Status: DISCONTINUED | OUTPATIENT
Start: 2022-05-07 | End: 2022-05-07 | Stop reason: HOSPADM

## 2022-05-07 ASSESSMENT — ENCOUNTER SYMPTOMS
ABDOMINAL DISTENTION: 0
BACK PAIN: 1
ABDOMINAL PAIN: 1
SORE THROAT: 0
BACK PAIN: 0
DIARRHEA: 0
TROUBLE SWALLOWING: 0
NAUSEA: 0
NAUSEA: 1
VOMITING: 0
VOMITING: 0
COUGH: 0
CONSTIPATION: 0
SHORTNESS OF BREATH: 0
SHORTNESS OF BREATH: 0
ABDOMINAL PAIN: 0

## 2022-05-07 NOTE — ED PROVIDER NOTES
[facility]   Emergency Department  Faculty Attestation       I performed a history and physical examination of the patient and discussed management with the resident. I reviewed the residents note and agree with the documented findings including all diagnostic interpretations and plan of care. Any areas of disagreement are noted on the chart. I was personally present for the key portions of any procedures. I have documented in the chart those procedures where I was not present during the key portions. I have reviewed the emergency nurses triage note. I agree with the chief complaint, past medical history, past surgical history, allergies, medications, social and family history as documented unless otherwise noted below. Documentation of the HPI, Physical Exam and Medical Decision Making performed by scribmargo is based on my personal performance of the HPI, PE and MDM. For Physician Assistant/ Nurse Practitioner cases/documentation I have personally evaluated this patient and have completed at least one if not all key elements of the E/M (history, physical exam, and MDM). Additional findings are as noted. Pertinent Comments     Primary Care Physician: None Provider      ED Triage Vitals [05/07/22 0048]   BP Temp Temp Source Pulse Resp SpO2 Height Weight   130/77 98.6 °F (37 °C) Oral 97 17 100 % -- --          This is a 32 y.o. male who presents to the Emergency Department with EMS for likely overdose. Patient reported that he was smoking crack found unresponsive. Given 2 mg of Narcan prior to arrival.  Patient stating that he has a headache and nausea, but no other symptoms. On exam he is awake alert no acute distress. Answering questions without difficulty. Alert and oriented x4 no focal deficits. He does not have any drowsiness maintaining airway. Drug overdose. Patient is well-appearing not drowsy, plan to give Zofran, patient eloped from the emergency department.         Critical Care: None     Roda Olszewski, MD  Attending Emergency Physician         Roda Olszewski, MD  05/07/22 [id number]

## 2022-05-07 NOTE — ED NOTES
Writer found patient trying to leave facility. Patient reports \"I have abdominal pain. Im fine really thank you\" and continued to walk away from writer after multiple attempts to tell patient he is not discharged. Resident notified.      FLORY Astorga  05/07/22 [id number]

## 2022-05-07 NOTE — ED PROVIDER NOTES
[facility] ED  Emergency Department Encounter  EmergencyMedicine Resident     Pt Gracie Paulson  MRN: [id number]  [place] 1990  Date of evaluation: 5/7/22  PCP:  No primary care provider on file. This patient was evaluated in the Emergency Department for symptoms described in the history of present illness. The patient was evaluated in the context of the global COVID-19 pandemic, which necessitated consideration that the patient might be at risk for infection with the SARS-CoV-2 virus that causes COVID-19. Institutional protocols and algorithms that pertain to the evaluation of patients at risk for COVID-19 are in a state of rapid change based on information released by regulatory bodies including the CDC and federal and state organizations. These policies and algorithms were followed during the patient's care in the ED. CHIEF COMPLAINT       Chief Complaint   Patient presents with    Abdominal Pain       HISTORY OF PRESENT ILLNESS  (Location/Symptom, Timing/Onset, Context/Setting, Quality, Duration, Modifying Factors, Severity.)      Janki Fonseca is a 32 y.o. male who presents with reported abdominal pain. Patient presented a few hours ago and did  Not want to be seen. Upon security letting the patient know he needed to leave patient signed back in. Patient is concerned about his bodily functions. Has a vague abdominal pain that he is unable to describe. Denies any issues with urination or bowel movements. Patient has chronic tingling and numbness to his BLE, due to a chronic neuropathy. Denies any CP, SOB, SI, HI.      PAST MEDICAL / SURGICAL / SOCIAL / FAMILY HISTORY      has a past medical history of Anxiety, Arthritis, Asthma, Bipolar I disorder, most recent episode (or current) depressed, unspecified, Clostridium difficile infection, COPD (chronic obstructive pulmonary disease) ([facility] [address] Depression, Disease of blood and blood forming organ, Eczema, Fracture, metacarpal, Gastric ulcer, Gastritis, GERD (gastroesophageal reflux disease), GI bleed, H. pylori infection, H/O blood clots, Head injury, Headache, Insomnia, Juvenile rheumatoid arthritis ([facility] [address] Neuromuscular disorder (HCC), PFAPA syndrome ([address] PUD (peptic ulcer disease), Rheumatoid arthritis ([address] Rheumatoid arthritis(714.0), Severe recurrent major depression without psychotic features ([facility] [address] Sleep apnea, 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[address] Substance abuse ([facility] [address] Suicidal ideation, Suicide attempt by hanging ([facility] [address] Tobacco dependence, Ulcerative colitis ([facility] [address] and UTI (urinary tract infection). has a past surgical history that includes Colonoscopy; bronchoscopy; other surgical history; Upper gastrointestinal endoscopy (2/4/16); pr egd transoral biopsy single/multiple (N/A, 3/20/2017); sigmoidoscopy (N/A, 3/20/2017); Cholecystectomy, laparoscopic (07/14/2017); pr esophagogastroduodenoscopy transoral diagnostic (N/A, 8/9/2017); pr colonoscopy w/biopsy single/multiple (8/9/2017); Abdomen surgery; Upper gastrointestinal endoscopy (N/A, 6/13/2018); Upper gastrointestinal endoscopy (N/A, 9/20/2018); and Endoscopy, colon, diagnostic. Social History     Socioeconomic History    Marital status: Single     Spouse name: Not on file    Number of children: Not on file    Years of education: Not on file    Highest education level: Not on file   Occupational History    Occupation: disability   Tobacco Use    Smoking status: Current Every Day Smoker     Packs/day: 0.50     Years: 15.00     Pack years: 7.50     Types: Cigarettes    Smokeless tobacco: Never Used   Vaping Use    Vaping Use: Former   Substance and Sexual Activity    Alcohol use: Yes     Comment: drinks daily    Drug use: Not Currently     Types: Opiates , Cocaine     Comment: Hx of opiates, benzos, cocaine, alcohol abuse    Sexual activity: Yes     Partners: Female     Comment: Lives alone, not working.    Other Topics Concern    Not on file   Social History Narrative    ** Merged History Encounter **          Social Determinants of Health     Financial Resource Strain: Medium Risk    Difficulty of Paying Living Expenses: Somewhat hard   Food Insecurity: No Food Insecurity    Worried About Running Out of Food in the Last Year: Never true    Micaela of Food in the Last Year: Never true   Transportation Needs: No Transportation Needs    Lack of Transportation (Medical): No    Lack of Transportation (Non-Medical):  No   Physical Activity: Inactive    Days of Exercise per Week: 0 days    Minutes of Exercise per Session: 0 min   Stress: Stress Concern Present    Feeling of Stress : Rather much   Social Connections: Socially Isolated    Frequency of Communication with Friends and Family: Never    Frequency of Social Gatherings with Friends and Family: Never    Attends Bahai Services: Never    Active Member of Clubs or Organizations: No    Attends Club or Organization Meetings: Never    Marital Status: Never    Intimate Partner Violence: Not At Risk    Fear of Current or Ex-Partner: No    Emotionally Abused: No    Physically Abused: No    Sexually Abused: No   Housing Stability: Low Risk     Unable to Pay for Housing in the Last Year: No    Number of [place] in the Last Year: 1    Unstable Housing in the Last Year: No       Family History   Problem Relation Age of Onset    Diabetes Father     Alcohol Abuse Father     Depression Father     Arthritis Father     High Blood Pressure Father     Other Father         aneurysm & epilepsy    Migraines Father     Arthritis Mother     Other Mother         aneurysm & epilepsy    Migraines Mother     Diabetes Brother         Aunt and uncles    Depression Brother     Mental Illness Brother     Other Brother         epilepsy    Migraines Brother     Stroke Other         Uncle    Other Brother         murdered Oct 6th, 2014    Colon Cancer Paternal Cousin 43    Other Sister epilepsy    Migraines Sister        Allergies:  Dicyclomine, Famotidine, Geodon [ziprasidone hcl], Haloperidol, Iv dye [iodides], Reglan [metoclopramide], Ibuprofen, Aspirin, Dicyclomine hcl, Hydroxyzine hcl, Iodine, Metronidazole, Mirtazapine, Promethazine, and Ziprasidone    Home Medications:  Prior to Admission medications    Medication Sig Start Date End Date Taking? Authorizing Provider   polyethylene glycol (MIRALAX) 17 g packet Take 17 g by mouth daily as needed for Constipation 5/5/22 6/4/22 Yes Con Alvares MD   Psyllium-Calcium (METAMUCIL PLUS CALCIUM) CAPS Take 1 capsule by mouth daily Take with 8 oz water. 5/5/22  Yes Con Alvares MD   diphenhydrAMINE (BENADRYL) 25 MG capsule Take 1 capsule by mouth nightly as needed for Itching 5/1/22   Yovana Hernandez, DO   ondansetron (ZOFRAN ODT) 4 MG disintegrating tablet Take 1 tablet by mouth every 8 hours as needed for Nausea or Vomiting 5/1/22   Faviola Potter, DO   aluminum-magnesium hydroxide 200-200 MG/5ML suspension Take 10 mLs by mouth every 6 hours as needed for Indigestion 4/22/22   Krystle Saunders, DO   traZODone (DESYREL) 50 MG tablet Take 1 tablet by mouth nightly as needed for Sleep 4/8/22   Etta Torrez MD   DULoxetine (CYMBALTA) 20 MG extended release capsule Take 1 capsule by mouth daily 4/8/22   Etta Torrez MD   meloxicam (MOBIC) 7.5 MG tablet Take 1 tablet by mouth 2 times daily as needed for Pain 8/19/21 10/30/21  MASSIEL Bowman - CNP       REVIEW OF SYSTEMS    (2-9 systems for level 4, 10 or more for level 5)      Review of Systems   Constitutional: Negative for activity change, appetite change, chills and fever. HENT: Negative for congestion, dental problem, sore throat and trouble swallowing. Respiratory: Negative for shortness of breath. Cardiovascular: Negative for chest pain. Gastrointestinal: Positive for abdominal pain.  Negative for abdominal distention, constipation, diarrhea, nausea and vomiting. Musculoskeletal: Positive for back pain. Negative for gait problem. Neurological: Negative for tremors, syncope, facial asymmetry, weakness, light-headedness and headaches. PHYSICAL EXAM   (up to 7 for level 4, 8 or more for level 5)      INITIAL VITALS:   /72   Pulse 82   Temp 97.3 °F (36.3 °C) (Oral)   Resp 18   SpO2 100%     Physical Exam  Constitutional:       General: He is awake. Appearance: Normal appearance. HENT:      Head: Normocephalic and atraumatic. Right Ear: External ear normal.      Left Ear: External ear normal.      Nose: Nose normal.   Eyes:      General: Lids are normal.      Extraocular Movements: Extraocular movements intact. Cardiovascular:      Rate and Rhythm: Normal rate. Pulses: Normal pulses. Heart sounds: Normal heart sounds. Pulmonary:      Effort: Pulmonary effort is normal.      Breath sounds: Normal breath sounds. Abdominal:      General: Abdomen is flat. Bowel sounds are normal.      Palpations: Abdomen is soft. Tenderness: There is no abdominal tenderness. Musculoskeletal:      Cervical back: Normal range of motion. Comments: Normal range of motion, strength and sensation intact in all extremities. Skin:     Capillary Refill: Capillary refill takes less than 2 seconds. Neurological:      Mental Status: He is alert and oriented to person, place, and time. Sensory: Sensation is intact. Motor: Motor function is intact. Psychiatric:         Mood and Affect: Mood normal.         Behavior: Behavior is cooperative.          DIFFERENTIAL  DIAGNOSIS     PLAN (LABS / IMAGING / EKG):  Orders Placed This Encounter   Procedures    Culture, Urine    CBC with Auto Differential    Comprehensive Metabolic Panel w/ Reflex to MG    Lipase    Urinalysis with Microscopic       MEDICATIONS ORDERED:  Orders Placed This Encounter   Medications    DISCONTD: polyethylene glycol (GLYCOLAX) packet 17 g    polyethylene glycol (MIRALAX) 17 g packet     Sig: Take 17 g by mouth daily as needed for Constipation     Dispense:  527 g     Refill:  1    Psyllium-Calcium (METAMUCIL PLUS CALCIUM) CAPS     Sig: Take 1 capsule by mouth daily Take with 8 oz water. Dispense:  120 capsule     Refill:  0       DDX: constipation, pancreatitis, cholecystitis, appendicitis    MDM: 32 y.o. male presents today with vague abdominal pain. Patient was seen for similar symptoms at [facility]. Patient has not been taking miralax. Abdominal labs are ordered and he is provided with miralax. Patient is given juice and diet due to hypoglycemia. Patient is resting comfortably, reports anxiety to attending physician. Patients vital signs are stable and he is discharged home with PCP follow up.          DIAGNOSTIC RESULTS / EMERGENCY DEPARTMENT COURSE / MDM   LAB RESULTS:  Results for orders placed or performed during the hospital encounter of 05/05/22   CBC with Auto Differential   Result Value Ref Range    WBC 6.7 3.5 - 11.3 k/uL    RBC 4.36 4.21 - 5.77 m/uL    Hemoglobin 13.0 13.0 - 17.0 g/dL    Hematocrit 38.9 (L) 40.7 - 50.3 %    MCV 89.2 82.6 - 102.9 fL    MCH 29.8 25.2 - 33.5 pg    MCHC 33.4 28.4 - 34.8 g/dL    RDW 13.2 11.8 - 14.4 %    Platelets 682 536 - 605 k/uL    MPV 10.3 8.1 - 13.5 fL    NRBC Automated 0.0 0.0 per 100 WBC    Seg Neutrophils 49 36 - 65 %    Lymphocytes 39 24 - 43 %    Monocytes 9 3 - 12 %    Eosinophils % 3 1 - 4 %    Basophils 0 0 - 2 %    Immature Granulocytes 0 0 %    Segs Absolute 3.29 1.50 - 8.10 k/uL    Absolute Lymph # 2.58 1.10 - 3.70 k/uL    Absolute Mono # 0.57 0.10 - 1.20 k/uL    Absolute Eos # 0.20 0.00 - 0.44 k/uL    Basophils Absolute <0.03 0.00 - 0.20 k/uL    Absolute Immature Granulocyte <0.03 0.00 - 0.30 k/uL   Comprehensive Metabolic Panel w/ Reflex to MG   Result Value Ref Range    Glucose 63 (L) 70 - 99 mg/dL    BUN 8 6 - 20 mg/dL    CREATININE 0.80 0.70 - 1.20 mg/dL    Calcium 9.0 8.6 - 10.4 mg/dL    Sodium 137 135 - 144 mmol/L    Potassium 3.6 (L) 3.7 - 5.3 mmol/L    Chloride 100 98 - 107 mmol/L    CO2 30 20 - 31 mmol/L    Anion Gap 7 (L) 9 - 17 mmol/L    Alkaline Phosphatase 63 40 - 129 U/L    ALT 17 5 - 41 U/L    AST 28 <40 U/L    Total Bilirubin <0.10 (L) 0.3 - 1.2 mg/dL    Total Protein 7.0 6.4 - 8.3 g/dL    Albumin 4.1 3.5 - 5.2 g/dL    Albumin/Globulin Ratio 1.4 1.0 - 2.5    GFR Non-African American >60 >60 mL/min    GFR African American >60 >60 mL/min    GFR Comment         Lipase   Result Value Ref Range    Lipase 40 13 - 60 U/L           RADIOLOGY:  No orders to display          EMERGENCY DEPARTMENT COURSE:  ED Course as of 05/07/22 0614   Thu May 05, 2022   0617 GLUCOSE, FASTING,GF(!): 63  Was given juice and a box lunch. [SS]      ED Course User Index  [SS] Tramaine Kaur MD            FINAL IMPRESSION      1.  Abdominal pain, unspecified abdominal location          DISPOSITION / PLAN     DISPOSITION Decision To Discharge 05/05/2022 06:21:10 AM      PATIENT REFERRED TO:  [address] [id number] [phone]  Schedule an appointment as soon as possible for a visit         DISCHARGE MEDICATIONS:  Discharge Medication List as of 5/5/2022  6:21 AM      START taking these medications    Details   polyethylene glycol (MIRALAX) 17 g packet Take 17 g by mouth daily as needed for Constipation, Disp-527 g, R-1Print      Psyllium-Calcium (METAMUCIL PLUS CALCIUM) CAPS Take 1 capsule by mouth daily Take with 8 oz water., Disp-120 capsule, R-0Print             Tramaine Kaur MD  Emergency Medicine Resident    (Please note that portions of thisnote were completed with a voice recognition program.  Efforts were made to edit the dictations but occasionally words are mis-transcribed.)        Tramaine Kaur MD  Resident  05/07/22 [id number]

## 2022-05-07 NOTE — ED TRIAGE NOTES
Patient arrived to ED via EMS for c/o drug overdose. Patient reports smoking \"cocaine\". EMS reports facility patient was nearby reports he was being \"disorderly\", then briefly lost consciousness. EMS reports facility reports rescue breathes were given prior to arrive. EMS reports giving 2 mg of Narcan IV and 4 mg of Zofran IV. Patient ripped out IV prior to arrival.  Patient is AOx4, ambulatory, and shows no signs of distress at this time.   Dr. Lizy Delaney is at bedside for eval.

## 2022-05-07 NOTE — ED PROVIDER NOTES
[facility] ED  Emergency Department Encounter  EmergencyMedicine Resident     Pt Hilario Zay Barreto  MRN: [id number]  Darcigfmohinder 1990  Date of evaluation: 5/7/22  PCP:  No primary care provider on file. CHIEF COMPLAINT       Chief Complaint   Patient presents with    Drug Overdose       HISTORY OF PRESENT ILLNESS  (Location/Symptom, Timing/Onset, Context/Setting, Quality, Duration, Modifying Factors, Severity.)      Estela French is a 32 y.o. male who presents with rest.  Patient reportedly thought he was using cocaine and was smoked something and became unresponsive. Patient responded to 2 mg of intranasal Narcan. Patient states he has a mild headache and some nausea no vomiting, abdominal pain, chest pain, or shortness of breath. Patient states he is never used opioids before, but his chart conflicts that. Patient requesting Phenergan for nausea. PAST MEDICAL / SURGICAL / SOCIAL / FAMILY HISTORY      has a past medical history of Anxiety, Arthritis, Asthma, Bipolar I disorder, most recent episode (or current) depressed, unspecified, Clostridium difficile infection, COPD (chronic obstructive pulmonary disease) ([address] Depression, Disease of blood and blood forming organ, Eczema, Fracture, metacarpal, Gastric ulcer, Gastritis, GERD (gastroesophageal reflux disease), GI bleed, H. pylori infection, H/O blood clots, Head injury, Headache, Insomnia, Juvenile rheumatoid arthritis ([address] Neuromuscular disorder ([address] PFAPA syndrome ([address] PUD (peptic ulcer disease), Rheumatoid arthritis ([address] Rheumatoid arthritis(714.0), Severe recurrent major depression without psychotic features ([address] Sleep apnea, 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[address] Substance abuse ([address] Suicidal ideation, Suicide attempt by hanging ([address] Tobacco dependence, Ulcerative colitis ([address] and UTI (urinary tract infection). has a past surgical history that includes Colonoscopy; bronchoscopy; other surgical history;  Upper gastrointestinal endoscopy (2/4/16); pr egd transoral biopsy single/multiple (N/A, 3/20/2017); sigmoidoscopy (N/A, 3/20/2017); Cholecystectomy, laparoscopic (07/14/2017); pr esophagogastroduodenoscopy transoral diagnostic (N/A, 8/9/2017); pr colonoscopy w/biopsy single/multiple (8/9/2017); Abdomen surgery; Upper gastrointestinal endoscopy (N/A, 6/13/2018); Upper gastrointestinal endoscopy (N/A, 9/20/2018); and Endoscopy, colon, diagnostic. Social History     Socioeconomic History    Marital status: Single     Spouse name: Not on file    Number of children: Not on file    Years of education: Not on file    Highest education level: Not on file   Occupational History    Occupation: disability   Tobacco Use    Smoking status: Current Every Day Smoker     Packs/day: 0.50     Years: 15.00     Pack years: 7.50     Types: Cigarettes    Smokeless tobacco: Never Used   Vaping Use    Vaping Use: Former   Substance and Sexual Activity    Alcohol use: Yes     Comment: drinks daily    Drug use: Not Currently     Types: Opiates , Cocaine     Comment: Hx of opiates, benzos, cocaine, alcohol abuse    Sexual activity: Yes     Partners: Female     Comment: Lives alone, not working. Other Topics Concern    Not on file   Social History Narrative    ** Merged History Encounter **          Social Determinants of Health     Financial Resource Strain: Medium Risk    Difficulty of Paying Living Expenses: Somewhat hard   Food Insecurity: No Food Insecurity    Worried About Running Out of Food in the Last Year: Never true    Micaela of Food in the Last Year: Never true   Transportation Needs: No Transportation Needs    Lack of Transportation (Medical): No    Lack of Transportation (Non-Medical):  No   Physical Activity: Inactive    Days of Exercise per Week: 0 days    Minutes of Exercise per Session: 0 min   Stress: Stress Concern Present    Feeling of Stress : Rather much   Social Connections: Socially Isolated    Frequency of Communication with Friends and Family: Never    Frequency of Social Gatherings with Friends and Family: Never    Attends Sabianist Services: Never    Active Member of Clubs or Organizations: No    Attends Club or Organization Meetings: Never    Marital Status: Never    Intimate Partner Violence: Not At Risk    Fear of Current or Ex-Partner: No    Emotionally Abused: No    Physically Abused: No    Sexually Abused: No   Housing Stability: Low Risk     Unable to Pay for Housing in the Last Year: No    Number of [place] in the Last Year: 1    Unstable Housing in the Last Year: No       Family History   Problem Relation Age of Onset    Diabetes Father     Alcohol Abuse Father     Depression Father     Arthritis Father     High Blood Pressure Father     Other Father         aneurysm & epilepsy    Migraines Father     Arthritis Mother     Other Mother         aneurysm & epilepsy    Migraines Mother     Diabetes Brother         Aunt and uncles    Depression Brother     Mental Illness Brother     Other Brother         epilepsy    Migraines Brother     Stroke Other         Uncle    Other Brother         murdered Oct 6th, 2014    Colon Cancer Paternal Cousin 43    Other Sister         epilepsy   [place] Migraines Sister        Allergies:  Dicyclomine, Famotidine, Geodon [ziprasidone hcl], Haloperidol, Iv dye [iodides], Reglan [metoclopramide], Ibuprofen, Aspirin, Dicyclomine hcl, Hydroxyzine hcl, Iodine, Metronidazole, Mirtazapine, Promethazine, and Ziprasidone    Home Medications:  Prior to Admission medications    Medication Sig Start Date End Date Taking? Authorizing Provider   polyethylene glycol (MIRALAX) 17 g packet Take 17 g by mouth daily as needed for Constipation 5/5/22 6/4/22  Nory Stoll MD   Psyllium-Calcium (METAMUCIL PLUS CALCIUM) CAPS Take 1 capsule by mouth daily Take with 8 oz water.  5/5/22   Nory Stoll MD   diphenhydrAMINE (BENADRYL) 25 MG capsule Take 1 capsule by mouth nightly as needed for Itching 5/1/22   Maru Abrahankyedouglas De La Torrenbgaby, DO   ondansetron (ZOFRAN ODT) 4 MG disintegrating tablet Take 1 tablet by mouth every 8 hours as needed for Nausea or Vomiting 5/1/22   Chung Dionisio, DO   aluminum-magnesium hydroxide 200-200 MG/5ML suspension Take 10 mLs by mouth every 6 hours as needed for Indigestion 4/22/22   [facility], DO   traZODone (DESYREL) 50 MG tablet Take 1 tablet by mouth nightly as needed for Sleep 4/8/22   Pedro Martel MD   DULoxetine (CYMBALTA) 20 MG extended release capsule Take 1 capsule by mouth daily 4/8/22   Pedro Martel MD   meloxicam (MOBIC) 7.5 MG tablet Take 1 tablet by mouth 2 times daily as needed for Pain 8/19/21 10/30/21  Ronny Fernandez APRSHAYAN - CNP       REVIEW OF SYSTEMS    (2-9 systems for level 4, 10 or more for level 5)      Review of Systems   Constitutional: Negative for chills, diaphoresis, fatigue and fever. Respiratory: Negative for cough and shortness of breath. Cardiovascular: Negative for chest pain. Gastrointestinal: Positive for nausea. Negative for abdominal pain and vomiting. Genitourinary: Negative for difficulty urinating, dysuria, flank pain and hematuria. Musculoskeletal: Negative for arthralgias, back pain, neck pain and neck stiffness. Neurological: Positive for headaches. Negative for dizziness, weakness, light-headedness and numbness. PHYSICAL EXAM   (up to 7 for level 4, 8 or more for level 5)      INITIAL VITALS:   /77   Pulse 97   Temp 98.6 °F (37 °C) (Oral)   Resp 17   SpO2 100%     Physical Exam  Vitals and nursing note reviewed. Constitutional:       General: He is not in acute distress. Appearance: He is well-developed. He is not diaphoretic. HENT:      Head: Normocephalic and atraumatic. Eyes:      Conjunctiva/sclera: Conjunctivae normal.      Pupils: Pupils are equal, round, and reactive to light. Neck:      Vascular: No JVD. Trachea: No tracheal deviation. Cardiovascular:      Rate and Rhythm: Normal rate and regular rhythm. Heart sounds: Normal heart sounds. No murmur heard. No friction rub. Pulmonary:      Effort: Pulmonary effort is normal. No respiratory distress. Breath sounds: Normal breath sounds. No wheezing or rales. Chest:      Chest wall: No tenderness. Abdominal:      General: Bowel sounds are normal. There is no distension. Palpations: Abdomen is soft. Tenderness: There is no abdominal tenderness. There is no guarding. Musculoskeletal:      Cervical back: Normal range of motion and neck supple. Skin:     General: Skin is warm and dry. Capillary Refill: Capillary refill takes less than 2 seconds. Coloration: Skin is not pale. Findings: No erythema or rash. Neurological:      General: No focal deficit present. Mental Status: He is alert and oriented to person, place, and time. Cranial Nerves: No cranial nerve deficit. Motor: No weakness. Psychiatric:         Mood and Affect: Mood normal.         Behavior: Behavior normal.         DIFFERENTIAL  DIAGNOSIS     PLAN (LABS / IMAGING / EKG):  No orders of the defined types were placed in this encounter. MEDICATIONS ORDERED:  Orders Placed This Encounter   Medications    acetaminophen (TYLENOL) tablet 1,000 mg    ondansetron (ZOFRAN-ODT) disintegrating tablet 4 mg       DDX: Overdose    MDM/IMPRESSION: This is a 51-year-old male who believes he was smoking crack cocaine but based on the apnea he used an opioid without knowing it. Received 2 mg of intranasal Narcan with immediate improvement of mentation and respiratory status. Afebrile, nontoxic in, no acute distress. Patient alert and orient x4, not clinically under the influence of substances at this time. Will monitor, anticipate discharge.     DIAGNOSTIC RESULTS / EMERGENCY DEPARTMENT COURSE / MDM   LAB RESULTS:  No results found for this visit on 05/07/22. RADIOLOGY:  No orders to display        EKG      All EKG's are interpreted by the Emergency Department Physician who either signs or Co-signs this chart in the absence of a cardiologist.    EMERGENCY DEPARTMENT COURSE:    Patient eloped prior to final evaluation. PROCEDURES:      CONSULTS:  None    CRITICAL CARE:      FINAL IMPRESSION      1. Accidental drug overdose, initial encounter    2. Eloped from emergency department          DISPOSITION / [address] - Left Before Treatment Complete 05/07/2022 01:02:07 AM      PATIENT REFERRED TO:  No follow-up provider specified.     DISCHARGE MEDICATIONS:  Current Discharge Medication List          Shantel Bailey DO  Emergency Medicine Resident    (Please note that portions of thisnote were completed with a voice recognition program.  Efforts were made to edit the dictations but occasionally words are mis-transcribed.)     Shantel Bailey DO  05/07/22 0104

## 2022-05-08 ENCOUNTER — HOSPITAL ENCOUNTER (EMERGENCY)
Age: 32
Discharge: HOME | End: 2022-05-09
Attending: EMERGENCY MEDICINE
Payer: COMMERCIAL

## 2022-05-08 ENCOUNTER — HOSPITAL ENCOUNTER (EMERGENCY)
Age: 32
Discharge: LEFT BEFORE DISCHARGE | End: 2022-05-08
Attending: EMERGENCY MEDICINE
Payer: COMMERCIAL

## 2022-05-08 ENCOUNTER — HOSPITAL ENCOUNTER (EMERGENCY)
Age: 32
Discharge: HOME | End: 2022-05-08
Attending: EMERGENCY MEDICINE
Payer: COMMERCIAL

## 2022-05-08 VITALS
HEART RATE: 90 BPM | DIASTOLIC BLOOD PRESSURE: 77 MMHG | OXYGEN SATURATION: 94 % | SYSTOLIC BLOOD PRESSURE: 123 MMHG | TEMPERATURE: 97.3 F | RESPIRATION RATE: 18 BRPM

## 2022-05-08 VITALS
SYSTOLIC BLOOD PRESSURE: 119 MMHG | OXYGEN SATURATION: 91 % | DIASTOLIC BLOOD PRESSURE: 71 MMHG | HEART RATE: 91 BPM | TEMPERATURE: 98.2 F | RESPIRATION RATE: 16 BRPM

## 2022-05-08 VITALS
SYSTOLIC BLOOD PRESSURE: 110 MMHG | DIASTOLIC BLOOD PRESSURE: 63 MMHG | TEMPERATURE: 97.3 F | OXYGEN SATURATION: 92 % | HEART RATE: 76 BPM | RESPIRATION RATE: 18 BRPM

## 2022-05-08 DIAGNOSIS — R45.851 SUICIDAL IDEATION: ICD-10-CM

## 2022-05-08 DIAGNOSIS — B07.0 PLANTAR WARTS: Primary | ICD-10-CM

## 2022-05-08 DIAGNOSIS — T40.1X4A HEROIN OVERDOSE, UNDETERMINED INTENT, INITIAL ENCOUNTER (HCC): Primary | ICD-10-CM

## 2022-05-08 DIAGNOSIS — R40.0 SOMNOLENCE: Primary | ICD-10-CM

## 2022-05-08 DIAGNOSIS — Z53.20 LEFT BEFORE TREATMENT COMPLETED: ICD-10-CM

## 2022-05-08 DIAGNOSIS — T50.901A ACCIDENTAL DRUG OVERDOSE, INITIAL ENCOUNTER: ICD-10-CM

## 2022-05-08 PROCEDURE — 2580000003 HC RX 258: Performed by: STUDENT IN AN ORGANIZED HEALTH CARE EDUCATION/TRAINING PROGRAM

## 2022-05-08 PROCEDURE — 6370000000 HC RX 637 (ALT 250 FOR IP): Performed by: STUDENT IN AN ORGANIZED HEALTH CARE EDUCATION/TRAINING PROGRAM

## 2022-05-08 PROCEDURE — 99281 EMR DPT VST MAYX REQ PHY/QHP: CPT

## 2022-05-08 PROCEDURE — 6360000002 HC RX W HCPCS: Performed by: STUDENT IN AN ORGANIZED HEALTH CARE EDUCATION/TRAINING PROGRAM

## 2022-05-08 PROCEDURE — 99283 EMERGENCY DEPT VISIT LOW MDM: CPT

## 2022-05-08 RX ORDER — MAGNESIUM HYDROXIDE/ALUMINUM HYDROXICE/SIMETHICONE 120; 1200; 1200 MG/30ML; MG/30ML; MG/30ML
30 SUSPENSION ORAL ONCE
Status: COMPLETED | OUTPATIENT
Start: 2022-05-08 | End: 2022-05-08

## 2022-05-08 RX ORDER — 0.9 % SODIUM CHLORIDE 0.9 %
1000 INTRAVENOUS SOLUTION INTRAVENOUS ONCE
Status: COMPLETED | OUTPATIENT
Start: 2022-05-08 | End: 2022-05-08

## 2022-05-08 RX ORDER — NALOXONE HYDROCHLORIDE 0.4 MG/ML
0.4 INJECTION, SOLUTION INTRAMUSCULAR; INTRAVENOUS; SUBCUTANEOUS PRN
Status: DISCONTINUED | OUTPATIENT
Start: 2022-05-08 | End: 2022-05-09 | Stop reason: HOSPADM

## 2022-05-08 RX ORDER — NALOXONE HYDROCHLORIDE 4 MG/.1ML
1 SPRAY NASAL ONCE
Status: DISCONTINUED | OUTPATIENT
Start: 2022-05-08 | End: 2022-05-09 | Stop reason: HOSPADM

## 2022-05-08 RX ORDER — NALOXONE HYDROCHLORIDE 1 MG/ML
INJECTION INTRAMUSCULAR; INTRAVENOUS; SUBCUTANEOUS
Status: DISCONTINUED
Start: 2022-05-08 | End: 2022-05-08 | Stop reason: HOSPADM

## 2022-05-08 RX ORDER — ACETAMINOPHEN 500 MG
1000 TABLET ORAL ONCE
Status: DISCONTINUED | OUTPATIENT
Start: 2022-05-08 | End: 2022-05-08 | Stop reason: HOSPADM

## 2022-05-08 RX ADMIN — ALUMINUM HYDROXIDE, MAGNESIUM HYDROXIDE, AND SIMETHICONE 30 ML: 200; 200; 20 SUSPENSION ORAL at 03:50

## 2022-05-08 RX ADMIN — NALOXONE HYDROCHLORIDE 0.4 MG: 0.4 INJECTION, SOLUTION INTRAMUSCULAR; INTRAVENOUS; SUBCUTANEOUS at 22:28

## 2022-05-08 RX ADMIN — SODIUM CHLORIDE 1000 ML: 9 INJECTION, SOLUTION INTRAVENOUS at 19:58

## 2022-05-08 ASSESSMENT — ENCOUNTER SYMPTOMS
NAUSEA: 0
NAUSEA: 0
SINUS PRESSURE: 0
EYE PAIN: 0
NAUSEA: 0
ABDOMINAL PAIN: 0
ABDOMINAL PAIN: 1
VOMITING: 0
PHOTOPHOBIA: 0
SORE THROAT: 0
CONSTIPATION: 0
SHORTNESS OF BREATH: 0
COUGH: 0
VOMITING: 0
COLOR CHANGE: 0
ABDOMINAL PAIN: 0
COUGH: 0
SHORTNESS OF BREATH: 0
VOMITING: 0
RHINORRHEA: 0
DIARRHEA: 0
DIARRHEA: 0
PHOTOPHOBIA: 0
SORE THROAT: 0
CONSTIPATION: 0
SHORTNESS OF BREATH: 0
CHEST TIGHTNESS: 0
EYE REDNESS: 0

## 2022-05-08 ASSESSMENT — PAIN DESCRIPTION - LOCATION
LOCATION: SHOULDER;BACK
LOCATION: ABDOMEN

## 2022-05-08 ASSESSMENT — PAIN DESCRIPTION - DESCRIPTORS: DESCRIPTORS: ACHING

## 2022-05-08 ASSESSMENT — PAIN - FUNCTIONAL ASSESSMENT: PAIN_FUNCTIONAL_ASSESSMENT: 0-10

## 2022-05-08 ASSESSMENT — PAIN SCALES - GENERAL: PAINLEVEL_OUTOF10: 7

## 2022-05-08 NOTE — ED PROVIDER NOTES
[facility] ED  Emergency Department Encounter  EmergencyMedicine Resident     Pt Lizz Joon Curry  MRN: [id number]  [place] 1990  Date of evaluation: 5/8/22  PCP:  No primary care provider on file. CHIEF COMPLAINT       Chief Complaint   Patient presents with    Drug Overdose       HISTORY OF PRESENT ILLNESS  (Location/Symptom, Timing/Onset, Context/Setting, Quality, Duration, Modifying Factors, Severity.)    This patient was evaluated in the Emergency Department for symptoms described in the history of present illness. He/she was evaluated in the context of the global COVID-19 pandemic, which necessitated consideration that the patient might be at risk for infection with the SARS-CoV-2 virus that causes COVID-19. Institutional protocols and algorithms that pertain to the evaluation of patients at risk for COVID-19 are in a state of rapid change based on information released by regulatory bodies including the CDC and federal and state organizations. These policies and algorithms were followed during the patient's care in the ED. Re Ramirez is a 32 y.o. male who presents to the ED today via EMS for accidental overdose. Patient was found unresponsive with pupils pinpoint. Was given 4 mg of Narcan intranasally by police and immediately became alert and oriented. Somnolent per EMS. Glucose in route greater than 180. Was given 4 mg of Zofran. Patient complaining of some mild lower extremity pain but otherwise no complaints. Endorses heroin use with accidental drug overdose. No suicidal or homicidal ideations. No chest pain, headache, dizziness.     PAST MEDICAL / SURGICAL / SOCIAL / FAMILY HISTORY      has a past medical history of Anxiety, Arthritis, Asthma, Bipolar I disorder, most recent episode (or current) depressed, unspecified, Clostridium difficile infection, COPD (chronic obstructive pulmonary disease) ([address] Depression, Disease of blood and blood forming organ, Eczema, Fracture, metacarpal, Gastric ulcer, Gastritis, GERD (gastroesophageal reflux disease), GI bleed, H. pylori infection, H/O blood clots, Head injury, Headache, Insomnia, Juvenile rheumatoid arthritis ([address] Neuromuscular disorder (H[facility]), PFAPA syndrome ([address] PUD (peptic ulcer disease), Rheumatoid arthritis ([address] Rheumatoid arthritis(714.0), Severe recurrent major depression without psychotic features ([address] Sleep apnea, 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[address] Substance abuse ([address] Suicidal ideation, Suicide attempt by hanging ([address] Tobacco dependence, Ulcerative colitis ([address] and UTI (urinary tract infection). has a past surgical history that includes Colonoscopy; bronchoscopy; other surgical history; Upper gastrointestinal endoscopy (2/4/16); pr egd transoral biopsy single/multiple (N/A, 3/20/2017); sigmoidoscopy (N/A, 3/20/2017); Cholecystectomy, laparoscopic (07/14/2017); pr esophagogastroduodenoscopy transoral diagnostic (N/A, 8/9/2017); pr colonoscopy w/biopsy single/multiple (8/9/2017); Abdomen surgery; Upper gastrointestinal endoscopy (N/A, 6/13/2018); Upper gastrointestinal endoscopy (N/A, 9/20/2018); and Endoscopy, colon, diagnostic. Social History     Socioeconomic History    Marital status: Single     Spouse name: Not on file    Number of children: Not on file    Years of education: Not on file    Highest education level: Not on file   Occupational History    Occupation: disability   Tobacco Use    Smoking status: Current Every Day Smoker     Packs/day: 0.50     Years: 15.00     Pack years: 7.50     Types: Cigarettes    Smokeless tobacco: Never Used   Vaping Use    Vaping Use: Former   Substance and Sexual Activity    Alcohol use: Yes     Comment: drinks daily    Drug use: Not Currently     Types: Opiates , Cocaine     Comment: Hx of opiates, benzos, cocaine, alcohol abuse    Sexual activity: Yes     Partners: Female     Comment: Lives alone, not working.    Other Topics Concern    Not on file Social History Narrative    ** Merged History Encounter **          Social Determinants of Health     Financial Resource Strain: Medium Risk    Difficulty of Paying Living Expenses: Somewhat hard   Food Insecurity: No Food Insecurity    Worried About Running Out of Food in the Last Year: Never true    Micaela of Food in the Last Year: Never true   Transportation Needs: No Transportation Needs    Lack of Transportation (Medical): No    Lack of Transportation (Non-Medical):  No   Physical Activity: Inactive    Days of Exercise per Week: 0 days    Minutes of Exercise per Session: 0 min   Stress: Stress Concern Present    Feeling of Stress : Rather much   Social Connections: Socially Isolated    Frequency of Communication with Friends and Family: Never    Frequency of Social Gatherings with Friends and Family: Never    Attends Sikhism Services: Never    Active Member of Clubs or Organizations: No    Attends Club or Organization Meetings: Never    Marital Status: Never    Intimate Partner Violence: Not At Risk    Fear of Current or Ex-Partner: No    Emotionally Abused: No    Physically Abused: No    Sexually Abused: No   Housing Stability: Low Risk     Unable to Pay for Housing in the Last Year: No    Number of [place] in the Last Year: 1    Unstable Housing in the Last Year: No       Family History   Problem Relation Age of Onset    Diabetes Father     Alcohol Abuse Father     Depression Father     Arthritis Father     High Blood Pressure Father     Other Father         aneurysm & epilepsy    Migraines Father     Arthritis Mother     Other Mother         aneurysm & epilepsy    Migraines Mother     Diabetes Brother         Aunt and uncles    Depression Brother     Mental Illness Brother     Other Brother         epilepsy    Migraines Brother     Stroke Other         Uncle    Other Brother         murdered Oct 6th, 2014    Colon Cancer Paternal Cousin 43    Other Sister         epilepsy   [place] Migraines Sister        Allergies:  Dicyclomine, Famotidine, Geodon [ziprasidone hcl], Haloperidol, Iv dye [iodides], Reglan [metoclopramide], Ibuprofen, Aspirin, Dicyclomine hcl, Hydroxyzine hcl, Iodine, Metronidazole, Mirtazapine, Promethazine, and Ziprasidone    Home Medications:  Prior to Admission medications    Medication Sig Start Date End Date Taking? Authorizing Provider   polyethylene glycol (MIRALAX) 17 g packet Take 17 g by mouth daily as needed for Constipation 5/5/22 6/4/22  Lis Rehman MD   Psyllium-Calcium (METAMUCIL PLUS CALCIUM) CAPS Take 1 capsule by mouth daily Take with 8 oz water. 5/5/22   Lis Rehman MD   diphenhydrAMINE (BENADRYL) 25 MG capsule Take 1 capsule by mouth nightly as needed for Itching 5/1/22   Nita Hernandez, DO   ondansetron (ZOFRAN ODT) 4 MG disintegrating tablet Take 1 tablet by mouth every 8 hours as needed for Nausea or Vomiting 5/1/22   Gwendloyn Service, DO   aluminum-magnesium hydroxide 200-200 MG/5ML suspension Take 10 mLs by mouth every 6 hours as needed for Indigestion 4/22/22   Beverly Render, DO   traZODone (DESYREL) 50 MG tablet Take 1 tablet by mouth nightly as needed for Sleep 4/8/22   Omar Yancey MD   DULoxetine (CYMBALTA) 20 MG extended release capsule Take 1 capsule by mouth daily 4/8/22   Omar Yancey MD   meloxicam (MOBIC) 7.5 MG tablet Take 1 tablet by mouth 2 times daily as needed for Pain 8/19/21 10/30/21  MASSIEL Sandhu - CNP       REVIEW OF SYSTEMS    (2-9 systems for level 4, 10 or more for level 5)      Review of Systems   Constitutional: Positive for chills. Negative for fever. Accidental drug overdose   Eyes: Negative for photophobia. Respiratory: Negative for cough and shortness of breath. Cardiovascular: Negative for chest pain. Gastrointestinal: Negative for abdominal pain, nausea and vomiting. Musculoskeletal: Negative for neck pain.    Neurological: Negative for dizziness and headaches. Psychiatric/Behavioral: Negative for suicidal ideas. PHYSICAL EXAM   (up to 7 for level 4, 8 or more for level 5)      INITIAL VITALS:   /71   Pulse 91   Temp 98.2 °F (36.8 °C) (Oral)   Resp 16   SpO2 91%     Physical Exam  Vitals reviewed. Constitutional:       General: He is not in acute distress. Appearance: He is well-developed. He is not toxic-appearing. Comments: Mildly somnolent on exam but will wake to verbal stimuli and answer questions appropriately. HENT:      Head: Normocephalic and atraumatic. Eyes:      Conjunctiva/sclera: Conjunctivae normal.      Comments: Pupils 4 mm bilaterally   Neck:      Trachea: No tracheal deviation. Cardiovascular:      Rate and Rhythm: Normal rate and regular rhythm. Heart sounds: Normal heart sounds. Pulmonary:      Effort: Pulmonary effort is normal. No respiratory distress. Breath sounds: Normal breath sounds. No stridor. No wheezing, rhonchi or rales. Abdominal:      General: Bowel sounds are normal. There is no distension. Palpations: Abdomen is soft. Tenderness: There is no abdominal tenderness. There is no guarding or rebound. Musculoskeletal:         General: No swelling or deformity. Cervical back: Normal range of motion. Comments: No peripheral edema. Normal 2+ DP pulses bilaterally, no tenderness in lower extremities, no evidence of traumatic injury   Skin:     General: Skin is warm and dry. Neurological:      Mental Status: He is alert and oriented to person, place, and time. Comments: Somnolent but will awaken answer questions appropriately. Moving all extremities with normal strength/tone. Sensation grossly intact.          DIFFERENTIAL  DIAGNOSIS     PLAN (LABS / IMAGING / EKG):  Orders Placed This Encounter   Procedures    Telemetry monitoring - continuous duration       MEDICATIONS ORDERED:  Orders Placed This Encounter   Medications    0.9 % sodium chloride bolus    naloxone (NARCAN) injection 0.4 mg    naloxone opiate overdose kit 4mg         DIAGNOSTIC RESULTS / EMERGENCY DEPARTMENT COURSE / MDM     No results found for this visit on 05/08/22. RADIOLOGY:  No orders to display        IMPRESSION/MDM/EMERGENCY DEPARTMENT COURSE:  Patient came to emergency department, HPI and physical exam were conducted. All nursing notes were reviewed. 80-year-old male present emergency department after accidental drug overdose. Endorses heroin use. No thoughts of self-harm. He is well-known in the emergency department is presented in similar state multiple times in the past.  Was given Narcan per police with good response and was alert and oriented. Upon arrival is mildly somnolent. Vitals within normal limits with exception of mild hypoxia. Improved after being placed on nasal cannula. Lungs are clear to auscultate. Heart regular rate and rhythm. No focal neurodeficits. Suspect accidental drug overdose, narcotic. We will continue to monitor on telemetry. Additional Narcan as needed for respiratory depression. Fluids. No signs of traumatic injury. Do not feel that imaging or labs are indicated at this time. ED Course as of 05/09/22 0039   Sun May 08, 2022   2058 Upon reevaluation patient sleeping comfortably. Will wake to verbal stimuli and answer questions appropriately [ZT]   2240 Episode of mild desaturation to low 90s despite supplemental oxygen. Given additional 0.4 mg of Narcan with improvement of sats to 98%. More alert at this time. We will continue to monitor. Narcan as needed [ZT]   Mon May 09, 2022   0013 Is up and walking around without difficulty. Stating that he intentionally overdosed attempt to hurt himself. At this time patient is call upon release and will be going to long term. [ZT]   0015 Patient attempting to elope. Security was called.  [ZT]      ED Course User Index  [ZT] James Stanton DO     Patient was brought back to room by security. Despite being suicidal patient is going to detention. FINAL IMPRESSION      1. Heroin overdose, undetermined intent, initial encounter ([facility] [address]    2.  Suicidal ideation          DISPOSITION / PLAN     DISPOSITION        PATIENT REFERRED TO:  [facility]  [address] [place] [address]  [phone]    As needed, If symptoms worsen      DISCHARGE MEDICATIONS:  New Prescriptions    No medications on file       Dutch Covarrubias DO  Emergency Medicine Resident    (Please note that portions of thisnote were completed with a voice recognition program.  Efforts were made to edit the dictations but occasionally words are mis-transcribed.)       Dutch Covarrubias DO  Resident  05/09/22 0040

## 2022-05-08 NOTE — ED NOTES
Patient recently discharged from the ER and states that he was walking home and became sleepy so he decided to [facility] back in \"     [facility]  05/08/22 5012

## 2022-05-08 NOTE — ED NOTES
Verbal order from Dr. Oumar Steven to [place] narcan. 02 sats 81% Patient awake and talking at the moment O2 sats up to 90s while awake, Narcan on hold. Patient states that he itches every where. Patient continues to scratch everywhere and draw blood. Patient was given lotion and powder for dry skin.  Patient continues to have hands down pants \"scratching\"     Reginald Rahman RN  05/08/22 3324

## 2022-05-08 NOTE — ED PROVIDER NOTES
[address] ED  Emergency Department Encounter  EmergencyMedicine Resident     Pt Adeline Atul Skinner  MRN: [id number]  [place] 1990  Date of evaluation: 5/8/22  PCP:  No primary care provider on file. This patient was evaluated in the Emergency Department for symptoms described in the history of present illness. The patient was evaluated in the context of the global COVID-19 pandemic, which necessitated consideration that the patient might be at risk for infection with the SARS-CoV-2 virus that causes COVID-19. Institutional protocols and algorithms that pertain to the evaluation of patients at risk for COVID-19 are in a state of rapid change based on information released by regulatory bodies including the CDC and federal and state organizations. These policies and algorithms were followed during the patient's care in the ED. CHIEF COMPLAINT       Chief Complaint   Patient presents with    Drug Overdose     patient states that he is sleepy       HISTORY OF PRESENT ILLNESS  (Location/Symptom, Timing/Onset, Context/Setting, Quality, Duration, Modifying Factors, Severity.)      Kristopher Choe is a 32 y.o. male who was just discharged for foot pain presents with somnolence, altered LOC. Per triage nurse patient walked out of ED after being discharged and several minutes later walked back in altered, drowsy appearing. Pt brought back to room. Denies trauma, did not fall or hit his head. No sign of injury. Pupils pinpoint. Initial SpO2 80% when sleeping, will increase to 96% when awake.      PAST MEDICAL / SURGICAL / SOCIAL / FAMILY HISTORY      has a past medical history of Anxiety, Arthritis, Asthma, Bipolar I disorder, most recent episode (or current) depressed, unspecified, Clostridium difficile infection, COPD (chronic obstructive pulmonary disease) ([facility] [address] Depression, Disease of blood and blood forming organ, Eczema, Fracture, metacarpal, Gastric ulcer, Gastritis, GERD (gastroesophageal reflux disease), GI bleed, H. pylori infection, H/O blood clots, Head injury, Headache, Insomnia, Juvenile rheumatoid arthritis ([facility] [address] Neuromuscular disorder (HCC), PFAPA syndrome ([facility] [address] PUD (peptic ulcer disease), Rheumatoid arthritis ([facility] [address] Rheumatoid arthritis(714.0), Severe recurrent major depression without psychotic features ([facility] [address] Sleep apnea, 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[facility] [address] Substance abuse ([facility] [address] Suicidal ideation, Suicide attempt by hanging ([facility] [address] Tobacco dependence, Ulcerative colitis ([facility] [address] and UTI (urinary tract infection). has a past surgical history that includes Colonoscopy; bronchoscopy; other surgical history; Upper gastrointestinal endoscopy (2/4/16); pr egd transoral biopsy single/multiple (N/A, 3/20/2017); sigmoidoscopy (N/A, 3/20/2017); Cholecystectomy, laparoscopic (07/14/2017); pr esophagogastroduodenoscopy transoral diagnostic (N/A, 8/9/2017); pr colonoscopy w/biopsy single/multiple (8/9/2017); Abdomen surgery; Upper gastrointestinal endoscopy (N/A, 6/13/2018); Upper gastrointestinal endoscopy (N/A, 9/20/2018); and Endoscopy, colon, diagnostic. Social History     Socioeconomic History    Marital status: Single     Spouse name: Not on file    Number of children: Not on file    Years of education: Not on file    Highest education level: Not on file   Occupational History    Occupation: disability   Tobacco Use    Smoking status: Current Every Day Smoker     Packs/day: 0.50     Years: 15.00     Pack years: 7.50     Types: Cigarettes    Smokeless tobacco: Never Used   Vaping Use    Vaping Use: Former   Substance and Sexual Activity    Alcohol use: Yes     Comment: drinks daily    Drug use: Not Currently     Types: Opiates , Cocaine     Comment: Hx of opiates, benzos, cocaine, alcohol abuse    Sexual activity: Yes     Partners: Female     Comment: Lives alone, not working.    Other Topics Concern    Not on file   Social History Narrative    ** Merged History Encounter **          Social Determinants of Health     Financial Resource Strain: Medium Risk    Difficulty of Paying Living Expenses: Somewhat hard   Food Insecurity: No Food Insecurity    Worried About Running Out of Food in the Last Year: Never true    Micaela of Food in the Last Year: Never true   Transportation Needs: No Transportation Needs    Lack of Transportation (Medical): No    Lack of Transportation (Non-Medical):  No   Physical Activity: Inactive    Days of Exercise per Week: 0 days    Minutes of Exercise per Session: 0 min   Stress: Stress Concern Present    Feeling of Stress : Rather much   Social Connections: Socially Isolated    Frequency of Communication with Friends and Family: Never    Frequency of Social Gatherings with Friends and Family: Never    Attends Orthodox Services: Never    Active Member of Clubs or Organizations: No    Attends Club or Organization Meetings: Never    Marital Status: Never    Intimate Partner Violence: Not At Risk    Fear of Current or Ex-Partner: No    Emotionally Abused: No    Physically Abused: No    Sexually Abused: No   Housing Stability: Low Risk     Unable to Pay for Housing in the Last Year: No    Number of [place] in the Last Year: 1    Unstable Housing in the Last Year: No       Family History   Problem Relation Age of Onset    Diabetes Father     Alcohol Abuse Father     Depression Father     Arthritis Father     High Blood Pressure Father     Other Father         aneurysm & epilepsy    Migraines Father     Arthritis Mother     Other Mother         aneurysm & epilepsy    Migraines Mother     Diabetes Brother         Aunt and uncles    Depression Brother     Mental Illness Brother     Other Brother         epilepsy    Migraines Brother     Stroke Other         Uncle    Other Brother         murdered Oct 6th, 2014    Colon Cancer Paternal Cousin 43    Other Sister         epilepsy   [facility] Migraines Sister        Allergies: Dicyclomine, Famotidine, Geodon [ziprasidone hcl], Haloperidol, Iv dye [iodides], Reglan [metoclopramide], Ibuprofen, Aspirin, Dicyclomine hcl, Hydroxyzine hcl, Iodine, Metronidazole, Mirtazapine, Promethazine, and Ziprasidone    Home Medications:  Prior to Admission medications    Medication Sig Start Date End Date Taking? Authorizing Provider   polyethylene glycol (MIRALAX) 17 g packet Take 17 g by mouth daily as needed for Constipation 5/5/22 6/4/22  Aida Jean MD   Psyllium-Calcium (METAMUCIL PLUS CALCIUM) CAPS Take 1 capsule by mouth daily Take with 8 oz water. 5/5/22   Aida Jean MD   diphenhydrAMINE (BENADRYL) 25 MG capsule Take 1 capsule by mouth nightly as needed for Itching 5/1/22   Maru Hernandez, DO   ondansetron (ZOFRAN ODT) 4 MG disintegrating tablet Take 1 tablet by mouth every 8 hours as needed for Nausea or Vomiting 5/1/22   Chung Stoneing, DO   aluminum-magnesium hydroxide 200-200 MG/5ML suspension Take 10 mLs by mouth every 6 hours as needed for Indigestion 4/22/22   [facility], DO   traZODone (DESYREL) 50 MG tablet Take 1 tablet by mouth nightly as needed for Sleep 4/8/22   Pedro Martel MD   DULoxetine (CYMBALTA) 20 MG extended release capsule Take 1 capsule by mouth daily 4/8/22   Pedro Martel MD   meloxicam (MOBIC) 7.5 MG tablet Take 1 tablet by mouth 2 times daily as needed for Pain 8/19/21 10/30/21  Ronny Fernandez APRN - CNP       REVIEW OF SYSTEMS    (2-9 systems for level 4, 10 or more for level 5)      Review of Systems   Constitutional: Positive for fatigue. Negative for activity change, chills, diaphoresis and fever. HENT: Negative for congestion, rhinorrhea, sinus pressure and sore throat. Eyes: Negative for photophobia, pain and redness. Respiratory: Negative for cough, chest tightness and shortness of breath. Cardiovascular: Negative for chest pain and leg swelling.    Gastrointestinal: Negative for abdominal pain, constipation, diarrhea, nausea and vomiting. Genitourinary: Negative for dysuria, flank pain, frequency and urgency. Musculoskeletal: Negative for arthralgias, myalgias and neck stiffness. Skin: Negative for color change, pallor and rash. Neurological: Negative for dizziness, syncope, weakness, light-headedness, numbness and headaches. Psychiatric/Behavioral: Negative for agitation, confusion, self-injury and suicidal ideas. PHYSICAL EXAM   (up to 7 for level 4, 8 or more for level 5)      INITIAL VITALS:   /63   Pulse 76   Temp 97.3 °F (36.3 °C) (Oral)   Resp 18   SpO2 92%     Physical Exam  Constitutional:  Well developed, Well nourished. Tired appearing  HENT:  Normocephalic, Atraumatic, Bilateral external ears normal,  Nose normal.   Neck: Normal range of motion, No stridor. Eyes:   No discharge. Pupils pinpoint  Respiratory:   No respiratory distress, Normal breath sounds without any wheezing, rales or rhonchi. Cardiovascular: Normal S1, S2. No rubs, gallops or murmurs. Gastrointestinal:  No organomegaly, no tenderness, no rebound or guarding. Musculoskeletal:  No extremity deformity. Lymphatic: No lymphadenopathy noted  Skin:  Warm, Dry,  No rash. Neurologic:  Alert & oriented x 3, Normal motor function,  No focal deficits noted. Psychiatric:  Affect normal, Judgment normal, Mood normal.              DIFFERENTIAL  DIAGNOSIS     PLAN (LABS / IMAGING / EKG):  No orders of the defined types were placed in this encounter. MEDICATIONS ORDERED:  Orders Placed This Encounter   Medications    naloxone (NARCAN) 2 MG/2ML injection     Re Butler: cabinet override       DDX: drug overdose    DIAGNOSTIC RESULTS / [address] [place] [address] / [facility]   LAB RESULTS:  No results found for this visit on 05/08/22. RADIOLOGY:  No results found. IMPRESSION: 12-year-old male presenting just after being discharged for foot pain, with drowsiness and pinpoint pupils. Denies any drug use, denies loss of consciousness, did not fall, no syncopal episode. No signs of injury or trauma on exam.  When asleep patient's SPO2 decreases in the 80s, proved to 96% when awake. He is easily arousable. Will have Narcan ready in case patient desats again with, otherwise will monitor and anticipate discharge home. EMERGENCY DEPARTMENT COURSE:  ED Course as of 05/08/22 0710   Sun May 08, 2022   7797 Pt eloped. [QC]      ED Course User Index  [QC] Amber Aleman MD               PROCEDURES:      CONSULTS:  None        FINAL IMPRESSION      1. Somnolence          DISPOSITION / PLAN     DISPOSITION Eloped - Left Before Treatment Complete 05/08/2022 06:35:54 AM      PATIENT REFERRED TO:  No follow-up provider specified.     DISCHARGE MEDICATIONS:  Discharge Medication List as of 5/8/2022  6:56 AM          Amber Aleman MD  Emergency Medicine Resident    (Please note that portions of thisnote were completed with a voice recognition program.  Efforts were made to edit the dictations but occasionally words are mis-transcribed.)        Amber Aleman MD  Resident  05/08/22 7521

## 2022-05-08 NOTE — ED NOTES
Patient removed )2 sat monitor and states that he feels better. ..       Ashlye Turner RN  05/08/22 0669

## 2022-05-08 NOTE — ED TRIAGE NOTES
Pt arrived to ED with c/o Abdominal pain,  shoulder and back pain. Pt denies any new falls and.or injuries. Pt A&O x 4, does not appear in acute distress, RR even and unlabored, resting comfortably on stretcher with eyes open and call light in reach. Vital signs obtained, medical hx and allergies reviewed with pt. Initial assessment performed by physician, Melanie Vidal will carry out initial orders/tasks and reassess pt.

## 2022-05-08 NOTE — ED NOTES
Pt had 2 small sips of maalox and refused the rest of medication.       Ashley Rodríguez LPN  04/83/22 8917

## 2022-05-08 NOTE — ED PROVIDER NOTES
[address] ED  Emergency Department Encounter  Emergency Medicine Resident     Pt Gladys Belle  MRN: [id number]  [place] 1990  Date of evaluation: 5/8/22  PCP:  No primary care provider on file. CHIEF COMPLAINT       Chief Complaint   Patient presents with    Back Pain    Shoulder Pain    Abdominal Pain       HISTORY OF PRESENT ILLNESS  (Location/Symptom, Timing/Onset, Context/Setting, Quality, Duration, Modifying Factors, Severity.)      Lieutenant Obrien is a 32 y.o. male who presents with a multitude of inconsistent complaints depending upon historian, for me abdominal pain as well as foot pain. Will not describe abdominal pain, nor articulate exacerbating or alleviating factors. Foot pain worse with walking, nausea toes and balls of his feet. No numbness tingling or weakness. No injury. Feet are pruritic which patient notes he has excoriated skin from scratching. PAST MEDICAL / SURGICAL / SOCIAL / FAMILY HISTORY      has a past medical history of Anxiety, Arthritis, Asthma, Bipolar I disorder, most recent episode (or current) depressed, unspecified, Clostridium difficile infection, COPD (chronic obstructive pulmonary disease) ([address] Depression, Disease of blood and blood forming organ, Eczema, Fracture, metacarpal, Gastric ulcer, Gastritis, GERD (gastroesophageal reflux disease), GI bleed, H. pylori infection, H/O blood clots, Head injury, Headache, Insomnia, Juvenile rheumatoid arthritis ([address] Neuromuscular disorder ([address] PFAPA syndrome ([address] PUD (peptic ulcer disease), Rheumatoid arthritis ([address] Rheumatoid arthritis(714.0), Severe recurrent major depression without psychotic features ([address] Sleep apnea, 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[address] Substance abuse ([address] Suicidal ideation, Suicide attempt by hanging ([address] Tobacco dependence, Ulcerative colitis ([address] and UTI (urinary tract infection).      has a past surgical history that includes Colonoscopy; bronchoscopy; other surgical history; Upper gastrointestinal endoscopy (2/4/16); pr egd transoral biopsy single/multiple (N/A, 3/20/2017); sigmoidoscopy (N/A, 3/20/2017); Cholecystectomy, laparoscopic (07/14/2017); pr esophagogastroduodenoscopy transoral diagnostic (N/A, 8/9/2017); pr colonoscopy w/biopsy single/multiple (8/9/2017); Abdomen surgery; Upper gastrointestinal endoscopy (N/A, 6/13/2018); Upper gastrointestinal endoscopy (N/A, 9/20/2018); and Endoscopy, colon, diagnostic. Social History     Socioeconomic History    Marital status: Single     Spouse name: Not on file    Number of children: Not on file    Years of education: Not on file    Highest education level: Not on file   Occupational History    Occupation: disability   Tobacco Use    Smoking status: Current Every Day Smoker     Packs/day: 0.50     Years: 15.00     Pack years: 7.50     Types: Cigarettes    Smokeless tobacco: Never Used   Vaping Use    Vaping Use: Former   Substance and Sexual Activity    Alcohol use: Yes     Comment: drinks daily    Drug use: Not Currently     Types: Opiates , Cocaine     Comment: Hx of opiates, benzos, cocaine, alcohol abuse    Sexual activity: Yes     Partners: Female     Comment: Lives alone, not working. Other Topics Concern    Not on file   Social History Narrative    ** Merged History Encounter **          Social Determinants of Health     Financial Resource Strain: Medium Risk    Difficulty of Paying Living Expenses: Somewhat hard   Food Insecurity: No Food Insecurity    Worried About Running Out of Food in the Last Year: Never true    Micaela of Food in the Last Year: Never true   Transportation Needs: No Transportation Needs    Lack of Transportation (Medical): No    Lack of Transportation (Non-Medical):  No   Physical Activity: Inactive    Days of Exercise per Week: 0 days    Minutes of Exercise per Session: 0 min   Stress: Stress Concern Present    Feeling of Stress : Rather much   Social Connections: Socially Isolated    Frequency of Communication with Friends and Family: Never    Frequency of Social Gatherings with Friends and Family: Never    Attends Mu-ism Services: Never    Active Member of Clubs or Organizations: No    Attends Club or Organization Meetings: Never    Marital Status: Never    Intimate Partner Violence: Not At Risk    Fear of Current or Ex-Partner: No    Emotionally Abused: No    Physically Abused: No    Sexually Abused: No   Housing Stability: Low Risk     Unable to Pay for Housing in the Last Year: No    Number of [place] in the Last Year: 1    Unstable Housing in the Last Year: No       Family History   Problem Relation Age of Onset    Diabetes Father     Alcohol Abuse Father     Depression Father     Arthritis Father     High Blood Pressure Father     Other Father         aneurysm & epilepsy    Migraines Father     Arthritis Mother     Other Mother         aneurysm & epilepsy    Migraines Mother     Diabetes Brother         Aunt and uncles    Depression Brother     Mental Illness Brother     Other Brother         epilepsy    Migraines Brother     Stroke Other         Uncle    Other Brother         murdered Oct 6th, 2014    Colon Cancer Paternal Cousin 43    Other Sister         epilepsy   [employer] Migraines Sister        Allergies:  Dicyclomine, Famotidine, Geodon [ziprasidone hcl], Haloperidol, Iv dye [iodides], Reglan [metoclopramide], Ibuprofen, Aspirin, Dicyclomine hcl, Hydroxyzine hcl, Iodine, Metronidazole, Mirtazapine, Promethazine, and Ziprasidone    Home Medications:  Prior to Admission medications    Medication Sig Start Date End Date Taking? Authorizing Provider   polyethylene glycol (MIRALAX) 17 g packet Take 17 g by mouth daily as needed for Constipation 5/5/22 6/4/22  Nena Ledesma MD   Psyllium-Calcium (METAMUCIL PLUS CALCIUM) CAPS Take 1 capsule by mouth daily Take with 8 oz water.  5/5/22   Nena Ledesma MD   diphenhydrAMINE (BENADRYL) 25 MG capsule Take 1 capsule by mouth nightly as needed for Itching 5/1/22   Angemae Hernandez, DO   ondansetron (ZOFRAN ODT) 4 MG disintegrating tablet Take 1 tablet by mouth every 8 hours as needed for Nausea or Vomiting 5/1/22   Nevaeh Tampa, DO   aluminum-magnesium hydroxide 200-200 MG/5ML suspension Take 10 mLs by mouth every 6 hours as needed for Indigestion 4/22/22   Sarahreggie Singleton, DO   traZODone (DESYREL) 50 MG tablet Take 1 tablet by mouth nightly as needed for Sleep 4/8/22   Anson Sheikh MD   DULoxetine (CYMBALTA) 20 MG extended release capsule Take 1 capsule by mouth daily 4/8/22   Anson Sheikh MD   meloxicam (MOBIC) 7.5 MG tablet Take 1 tablet by mouth 2 times daily as needed for Pain 8/19/21 10/30/21  Tracy Holley, APRN - CNP       REVIEW OF SYSTEMS    (2-9 systems for level 4, 10 or more for level 5)      Review of Systems   Constitutional: Negative for fever. HENT: Negative for sore throat. Eyes: Negative for visual disturbance. Respiratory: Negative for shortness of breath. Cardiovascular: Negative for chest pain. Gastrointestinal: Positive for abdominal pain. Negative for constipation, diarrhea, nausea and vomiting. Genitourinary: Negative for decreased urine volume. Musculoskeletal: Negative for arthralgias and myalgias. Skin: Positive for wound. Neurological: Negative for weakness, light-headedness and headaches. Psychiatric/Behavioral: Negative for confusion. PHYSICAL EXAM   (up to 7 for level 4, 8 or more for level 5)      INITIAL VITALS:   /77   Pulse 90   Temp 97.3 °F (36.3 °C)   Resp 18   SpO2 94%     Physical Exam  Vitals and nursing note reviewed. Constitutional:       Appearance: Normal appearance. He is well-developed. HENT:      Head: Normocephalic and atraumatic.       Right Ear: External ear normal.      Left Ear: External ear normal.      Nose: Nose normal.   Neck:      Trachea: Trachea normal. No tracheal deviation. Cardiovascular:      Rate and Rhythm: Normal rate and regular rhythm. Heart sounds: Normal heart sounds. Pulmonary:      Effort: Pulmonary effort is normal. No respiratory distress. Abdominal:      Palpations: Abdomen is soft. Tenderness: There is generalized abdominal tenderness (Distractible exam not consistent). Musculoskeletal:         General: No deformity. Normal range of motion. Cervical back: Normal range of motion. No rigidity. Feet:      Comments: Multiple plantars warts  Skin:     General: Skin is warm and dry. Capillary Refill: Capillary refill takes less than 2 seconds. Neurological:      General: No focal deficit present. Mental Status: He is alert and oriented to person, place, and time. Psychiatric:         Mood and Affect: Mood normal.         Behavior: Behavior normal.         DIFFERENTIAL  DIAGNOSIS     PLAN (LABS / IMAGING / EKG):  No orders of the defined types were placed in this encounter. MEDICATIONS ORDERED:  Orders Placed This Encounter   Medications    DISCONTD: acetaminophen (TYLENOL) tablet 1,000 mg    aluminum & magnesium hydroxide-simethicone (MAALOX) 178-280-09 MG/5ML suspension 30 mL       DDX:     DIAGNOSTIC RESULTS / EMERGENCY DEPARTMENT COURSE / [facility]     LABS:  No results found for this visit on 05/08/22. RADIOLOGY:  None    EKG  None    All EKG's are interpreted by the Emergency Department Physician who either signs or Co-signs this chart in the absence of a cardiologist.    EMERGENCY DEPARTMENT COURSE:  Patient found seated upright in bed, no acute distress, not ill or toxic appearing. Inconsistent historian with varying in a multitude of complaints, never the same complaint for different historians. Patient with normal labs 3 days ago. Patient has calluses and appears to be plantars warts on the bottom of the feet. Given the multitude on his feet will refer to podiatry. Foot care products provided. Patient declined medications for abdominal pain or back pain. Subsequently discharged with plan for outpatient follow-up. PROCEDURES:  None    CONSULTS:  None    CRITICAL CARE:  None    FINAL IMPRESSION      1.  Plantar warts          DISPOSITION / PLAN     DISPOSITION Decision To Discharge 05/08/2022 04:15:59 AM      PATIENT REFERRED TO:  Alana Folres [facility]  [address] [facility] [address]  [phone]  Schedule an appointment as soon as possible for a visit   To establish care, Regarding this visit    [facility] ED  [address] [facility] [address]  [phone]  Go to   If symptoms worsen    [address]  [address] [facility] [address] [place] [address]  [phone]  Schedule an appointment as soon as possible for a visit in 1 week  Regarding this visit for wound check      DISCHARGE MEDICATIONS:  Discharge Medication List as of 5/8/2022  4:17 AM          Blas Adler MD  Emergency Medicine Resident    (Please note that portions of this note were completed with a voice recognition program.  Efforts were made to edit the dictations but occasionally words are mis-transcribed.)        Blas Adler MD  Resident  05/08/22 9899

## 2022-05-08 NOTE — ED NOTES
Pt presents to the ED by [facility] after being found down at the [employer] with agonal respirations and pinpoint pupils. Pt received 4MG IN and 2MG IV of Narcan.  Pt alert upon arrival.      Ryan Sun RN  05/08/22 1952

## 2022-05-09 NOTE — ED NOTES
Pt resting on stretcher with eyes closed. RR even and unlabored. No distress noted. Call light within reach.       Denis Gilliam RN  05/08/22 4817

## 2022-05-09 NOTE — ED NOTES
Pt denies being suicidal. Pt states that it was an accidental overdose.       Lena Villalba RN  05/08/22 2024

## 2022-05-09 NOTE — ED PROVIDER NOTES
[facility]   Emergency Department  Faculty Attestation       I performed a history and physical examination of the patient and discussed management with the resident. I reviewed the residents note and agree with the documented findings including all diagnostic interpretations and plan of care. Any areas of disagreement are noted on the chart. I was personally present for the key portions of any procedures. I have documented in the chart those procedures where I was not present during the key portions. I have reviewed the emergency nurses triage note. I agree with the chief complaint, past medical history, past surgical history, allergies, medications, social and family history as documented unless otherwise noted below. Documentation of the HPI, Physical Exam and Medical Decision Making performed by scribmargo is based on my personal performance of the HPI, PE and MDM. For Physician Assistant/ Nurse Practitioner cases/documentation I have personally evaluated this patient and have completed at least one if not all key elements of the E/M (history, physical exam, and MDM). Additional findings are as noted. Pertinent Comments     Primary Care Physician: No primary care provider on file. ED Triage Vitals [05/08/22 0324]   BP Temp Temp src Pulse Resp SpO2 Height Weight   123/77 97.3 °F (36.3 °C) -- 90 18 94 % -- --          This is a 32 y.o. male who presents to the Emergency Department with complaint of bilateral foot pain. Patient changes his complaint each time a new person goes in the room. For me he states that he has scratches on his feet and he needs something for them. However he refuses to let me examine him and states that there is no reason that I need to touch him. However from visual exam, bilateral feet do have some excoriations. There are some plantar warts in the base of the right foot as document on his previous visit. Recommend outpatient follow-up with podiatry.   Was given barrier cream for his feet as well as new socks. Patient ambulating with a steady gait alert and oriented okay for discharge.     Critical Care: None     Mohini Weeks MD  Attending Emergency Physician         Mohini Weeks MD  05/08/22 6187

## 2022-05-09 NOTE — ED PROVIDER NOTES
[address] [facility]   Emergency Department  Faculty Attestation       I performed a history and physical examination of the patient and discussed management with the resident. I reviewed the residents note and agree with the documented findings including all diagnostic interpretations and plan of care. Any areas of disagreement are noted on the chart. I was personally present for the key portions of any procedures. I have documented in the chart those procedures where I was not present during the key portions. I have reviewed the emergency nurses triage note. I agree with the chief complaint, past medical history, past surgical history, allergies, medications, social and family history as documented unless otherwise noted below. Documentation of the HPI, Physical Exam and Medical Decision Making performed by scribmargo is based on my personal performance of the HPI, PE and MDM. For Physician Assistant/ Nurse Practitioner cases/documentation I have personally evaluated this patient and have completed at least one if not all key elements of the E/M (history, physical exam, and MDM). Additional findings are as noted. Pertinent Comments     Primary Care Physician: No primary care provider on file. ED Triage Vitals [05/08/22 0604]   BP Temp Temp Source Pulse Resp SpO2 Height Weight   110/63 97.3 °F (36.3 °C) Oral 76 18 92 % -- --          This is a 32 y.o. male who presents to the Emergency Department with drowsiness after just being discharged in the ED. Patient had gone outside, and came back in and has been very drowsy. Patient was initially answering questions for nursing staff, however he then started to become drowsy and he had dozed off. His oxygen saturation dropped into the 80s, therefore did plan to do intranasal Narcan however patient woke up with it went to the room and refused Narcan and oxygen saturation did improve. Patient was seen standing up in the room masturbating.   Patient refused to let me do full thorough examination, but he did allow resident to auscultate and palpate abdomen. Patient monitored, continues to maintain oxygen saturations, he then ambulated out of the ED prior to being discharged as he wanted to go out to the lobby, and then was looking for coffee. Critical Care: There was significant risk of life threatening deterioration of patient's condition requiring my direct management. Critical care time 5 minutes, excluding any documented procedures.     Roda Olszewski, MD  Attending Emergency Physician         Roda Olszewski, MD  05/09/22 0000

## 2022-05-09 NOTE — ED NOTES
O2 sat 91% on RA. Pt maintaining his own airway. RR even and unlabored. No distress noted. Call light within reach.       Duarte Singer RN  05/08/22 5749

## 2022-05-09 NOTE — ED NOTES
Pt placed on RA. O2 sat 94%. RR even and unlabored. No distress noted. Pt resting on stretcher with eyes closed. Call light within reach.       Robert Najera RN  05/08/22 8193

## 2022-05-09 NOTE — ED NOTES
Pt walking out of room states \"I just want to find the vending machine and then I'll come back. I promise. \" Writer explained that there weren't any vending machines and he could be provided with a turkey sandwich. Pt refused. [place] police called. Pt continued to walk around ED with writer and Kayleigh Joy, coordinator. Pt continued to walk out to ambulance bay. [place] police were able to get pt into custody and brought back to room 15.       Miguelito Disla RN  05/09/22 0501

## 2022-05-09 NOTE — ED NOTES
Pt 84-86% on RA. O2 titrated up to 4L NC to obtain an O2 sat at 92%. Pt on full cardiac monitor.       Rupal Navarro RN  05/08/22 2002

## 2022-05-09 NOTE — ED PROVIDER NOTES
[facility]     Emergency Department     Faculty Note/ Attestation      Pt Name: Emily Zaragoza                                       MRN: [id number]  [place] 1990  Date of evaluation: 5/8/2022    Patients PCP:    No primary care provider on file. Attestation  I performed a history and physical examination of the patient and discussed management with the resident. I reviewed the residents note and agree with the documented findings and plan of care. Any areas of disagreement are noted on the chart. I was personally present for the key portions of any procedures. I have documented in the chart those procedures where I was not present during the key portions. I have reviewed the emergency nurses triage note. I agree with the chief complaint, past medical history, past surgical history, allergies, medications, social and family history as documented unless otherwise noted below. For Physician Assistant/ Nurse Practitioner cases/documentation I have personally evaluated this patient and have completed at least one if not all key elements of the E/M (history, physical exam, and MDM). Additional findings are as noted. Initial Screens:             Vitals:    Vitals:    05/08/22 1953 05/08/22 1954 05/08/22 1955 05/08/22 1956   BP:  116/76     Pulse: 96      Resp:    14   Temp: 98.2 °F (36.8 °C)      TempSrc: Oral      SpO2:  (!) 86% (!) 84% 91%       CHIEF COMPLAINT     No chief complaint on file. Is a 68-year-old male has overdosed with opiates patient was given naloxone by EMS patient is now breathing sleepy tired but no longer in respiratory compromise. Patient is not to answer questions at this time but does note he is at [address] [place], [place] [address]way:     -------------------------  BP: 116/76, Temp: 98.2 °F (36.8 °C), Pulse: 96, Resp: 14  Physical Exam  Constitutional:       Appearance: He is well-developed. He is not diaphoretic.       Comments: Sleepy with shakes from the withdraws   HENT:      Head: Normocephalic and atraumatic. Right Ear: External ear normal.      Left Ear: External ear normal.   Eyes:      General: No scleral icterus. Right eye: No discharge. Left eye: No discharge. Neck:      Trachea: No tracheal deviation. Cardiovascular:      Rate and Rhythm: Normal rate. Pulmonary:      Effort: Pulmonary effort is normal. No respiratory distress. Breath sounds: No stridor. Musculoskeletal:         General: Normal range of motion. Cervical back: Normal range of motion. Right lower leg: No edema. Left lower leg: No edema. Skin:     General: Skin is warm and dry. Neurological:      Comments: Sleepy but awakens to verbal stimulii the pt has no respiratory compromise at this time. Comments  Patient overdosed on opiates at this time not in acute distress still has respiratory rate will monitor for the next hour to 2 hours for improvement in symptoms fluids will be administered and patient will be given opiate reversal medications for discharge      ED Course as of 05/09/22 0040   Sun May 08, 2022   2058 Upon reevaluation patient sleeping comfortably. Will wake to verbal stimuli and answer questions appropriately [ZT]   2240 Episode of mild desaturation to low 90s despite supplemental oxygen. Given additional 0.4 mg of Narcan with improvement of sats to 98%. More alert at this time. We will continue to monitor. Narcan as needed [ZT]   Mon May 09, 2022   0013 Is up and walking around without difficulty. Stating that he intentionally overdosed attempt to hurt himself. At this time patient is call upon release and will be going to long-term. [ZT]   0015 Patient attempting to elope. Security was called. [ZT]      ED Course User Index  [ZT] DO Sukhwinder Ellisons Sacks, DO,, DO, RDMS.   Attending Emergency Physician          Doss Sacks, DO  05/09/22 0040

## 2022-05-09 NOTE — ED NOTES
Pt desatted to 86% on RA. Pt placed on 1L O2, satting at 90%. Pt given PRN order of Narcan, O2 sat now 100%.       Gio Quintero RN  05/08/22 3449

## 2022-05-24 ENCOUNTER — HOSPITAL ENCOUNTER (EMERGENCY)
Age: 32
Discharge: HOME | End: 2022-05-24
Attending: EMERGENCY MEDICINE
Payer: COMMERCIAL

## 2022-05-24 ENCOUNTER — APPOINTMENT (OUTPATIENT)
Dept: GENERAL RADIOLOGY | Age: 32
End: 2022-05-24
Payer: COMMERCIAL

## 2022-05-24 ENCOUNTER — HOSPITAL ENCOUNTER (EMERGENCY)
Age: 32
Discharge: LEFT BEFORE DISCHARGE | End: 2022-05-24
Attending: EMERGENCY MEDICINE
Payer: COMMERCIAL

## 2022-05-24 VITALS
HEART RATE: 76 BPM | TEMPERATURE: 97.6 F | DIASTOLIC BLOOD PRESSURE: 82 MMHG | OXYGEN SATURATION: 97 % | RESPIRATION RATE: 18 BRPM | SYSTOLIC BLOOD PRESSURE: 141 MMHG

## 2022-05-24 VITALS
TEMPERATURE: 98.1 F | HEART RATE: 85 BPM | SYSTOLIC BLOOD PRESSURE: 154 MMHG | OXYGEN SATURATION: 99 % | DIASTOLIC BLOOD PRESSURE: 96 MMHG | RESPIRATION RATE: 16 BRPM

## 2022-05-24 VITALS
DIASTOLIC BLOOD PRESSURE: 87 MMHG | RESPIRATION RATE: 26 BRPM | HEART RATE: 86 BPM | BODY MASS INDEX: 30.27 KG/M2 | SYSTOLIC BLOOD PRESSURE: 141 MMHG | WEIGHT: 205 LBS | TEMPERATURE: 97.3 F | OXYGEN SATURATION: 97 %

## 2022-05-24 DIAGNOSIS — R00.2 PALPITATIONS: ICD-10-CM

## 2022-05-24 DIAGNOSIS — R10.32 LEFT LOWER QUADRANT ABDOMINAL PAIN: Primary | ICD-10-CM

## 2022-05-24 DIAGNOSIS — R07.9 CHEST PAIN, UNSPECIFIED TYPE: Primary | ICD-10-CM

## 2022-05-24 DIAGNOSIS — Z53.21 ELOPED FROM EMERGENCY DEPARTMENT: ICD-10-CM

## 2022-05-24 DIAGNOSIS — R10.9 ABDOMINAL PAIN, UNSPECIFIED ABDOMINAL LOCATION: ICD-10-CM

## 2022-05-24 DIAGNOSIS — F41.9 ANXIETY: Primary | ICD-10-CM

## 2022-05-24 LAB
ABSOLUTE EOS #: 0.09 K/UL (ref 0–0.44)
ABSOLUTE IMMATURE GRANULOCYTE: 0.03 K/UL (ref 0–0.3)
ABSOLUTE LYMPH #: 2.58 K/UL (ref 1.1–3.7)
ABSOLUTE MONO #: 0.53 K/UL (ref 0.1–1.2)
BASOPHILS # BLD: 0 % (ref 0–2)
BASOPHILS ABSOLUTE: 0.03 K/UL (ref 0–0.2)
EOSINOPHILS RELATIVE PERCENT: 1 % (ref 1–4)
HCT VFR BLD CALC: 40.9 % (ref 40.7–50.3)
HEMOGLOBIN: 13.7 G/DL (ref 13–17)
IMMATURE GRANULOCYTES: 0 %
LIPASE: 29 U/L (ref 13–60)
LYMPHOCYTES # BLD: 24 % (ref 24–43)
MCH RBC QN AUTO: 30.2 PG (ref 25.2–33.5)
MCHC RBC AUTO-ENTMCNC: 33.5 G/DL (ref 28.4–34.8)
MCV RBC AUTO: 90.3 FL (ref 82.6–102.9)
MONOCYTES # BLD: 5 % (ref 3–12)
NRBC AUTOMATED: 0 PER 100 WBC
PDW BLD-RTO: 13.7 % (ref 11.8–14.4)
PLATELET # BLD: 338 K/UL (ref 138–453)
PMV BLD AUTO: 9.6 FL (ref 8.1–13.5)
RBC # BLD: 4.53 M/UL (ref 4.21–5.77)
SEG NEUTROPHILS: 70 % (ref 36–65)
SEGMENTED NEUTROPHILS ABSOLUTE COUNT: 7.6 K/UL (ref 1.5–8.1)
TROPONIN, HIGH SENSITIVITY: <6 NG/L (ref 0–22)
WBC # BLD: 10.9 K/UL (ref 3.5–11.3)

## 2022-05-24 PROCEDURE — 83690 ASSAY OF LIPASE: CPT

## 2022-05-24 PROCEDURE — 2580000003 HC RX 258

## 2022-05-24 PROCEDURE — 6360000002 HC RX W HCPCS: Performed by: GENERAL PRACTICE

## 2022-05-24 PROCEDURE — 6370000000 HC RX 637 (ALT 250 FOR IP)

## 2022-05-24 PROCEDURE — 93005 ELECTROCARDIOGRAM TRACING: CPT | Performed by: GENERAL PRACTICE

## 2022-05-24 PROCEDURE — 6360000002 HC RX W HCPCS

## 2022-05-24 PROCEDURE — 96374 THER/PROPH/DIAG INJ IV PUSH: CPT

## 2022-05-24 PROCEDURE — 84484 ASSAY OF TROPONIN QUANT: CPT

## 2022-05-24 PROCEDURE — 99283 EMERGENCY DEPT VISIT LOW MDM: CPT

## 2022-05-24 PROCEDURE — 71045 X-RAY EXAM CHEST 1 VIEW: CPT

## 2022-05-24 PROCEDURE — 6360000002 HC RX W HCPCS: Performed by: EMERGENCY MEDICINE

## 2022-05-24 PROCEDURE — 99285 EMERGENCY DEPT VISIT HI MDM: CPT

## 2022-05-24 PROCEDURE — 96361 HYDRATE IV INFUSION ADD-ON: CPT

## 2022-05-24 PROCEDURE — 93005 ELECTROCARDIOGRAM TRACING: CPT

## 2022-05-24 PROCEDURE — 85025 COMPLETE CBC W/AUTO DIFF WBC: CPT

## 2022-05-24 PROCEDURE — 6370000000 HC RX 637 (ALT 250 FOR IP): Performed by: EMERGENCY MEDICINE

## 2022-05-24 RX ORDER — ONDANSETRON 4 MG/1
4 TABLET, ORALLY DISINTEGRATING ORAL ONCE
Status: DISCONTINUED | OUTPATIENT
Start: 2022-05-24 | End: 2022-05-24 | Stop reason: HOSPADM

## 2022-05-24 RX ORDER — 0.9 % SODIUM CHLORIDE 0.9 %
1000 INTRAVENOUS SOLUTION INTRAVENOUS ONCE
Status: COMPLETED | OUTPATIENT
Start: 2022-05-24 | End: 2022-05-24

## 2022-05-24 RX ORDER — DIPHENHYDRAMINE HYDROCHLORIDE 50 MG/ML
25 INJECTION INTRAMUSCULAR; INTRAVENOUS ONCE
Status: COMPLETED | OUTPATIENT
Start: 2022-05-24 | End: 2022-05-24

## 2022-05-24 RX ORDER — PROMETHAZINE HYDROCHLORIDE 25 MG/ML
12.5 INJECTION, SOLUTION INTRAMUSCULAR; INTRAVENOUS ONCE
Status: COMPLETED | OUTPATIENT
Start: 2022-05-24 | End: 2022-05-24

## 2022-05-24 RX ORDER — MAGNESIUM HYDROXIDE/ALUMINUM HYDROXICE/SIMETHICONE 120; 1200; 1200 MG/30ML; MG/30ML; MG/30ML
30 SUSPENSION ORAL ONCE
Status: COMPLETED | OUTPATIENT
Start: 2022-05-24 | End: 2022-05-24

## 2022-05-24 RX ORDER — ALPRAZOLAM 0.5 MG/1
0.5 TABLET ORAL NIGHTLY PRN
Status: ON HOLD | COMMUNITY
End: 2022-05-30 | Stop reason: HOSPADM

## 2022-05-24 RX ORDER — LORAZEPAM 1 MG/1
1 TABLET ORAL ONCE
Status: COMPLETED | OUTPATIENT
Start: 2022-05-24 | End: 2022-05-24

## 2022-05-24 RX ORDER — DIPHENHYDRAMINE HYDROCHLORIDE 50 MG/ML
12.5 INJECTION INTRAMUSCULAR; INTRAVENOUS ONCE
Status: COMPLETED | OUTPATIENT
Start: 2022-05-24 | End: 2022-05-24

## 2022-05-24 RX ADMIN — SODIUM CHLORIDE 1000 ML: 9 INJECTION, SOLUTION INTRAVENOUS at 05:58

## 2022-05-24 RX ADMIN — DIPHENHYDRAMINE HYDROCHLORIDE 12.5 MG: 50 INJECTION, SOLUTION INTRAMUSCULAR; INTRAVENOUS at 06:15

## 2022-05-24 RX ADMIN — LORAZEPAM 1 MG: 1 TABLET ORAL at 03:23

## 2022-05-24 RX ADMIN — PROMETHAZINE HYDROCHLORIDE 12.5 MG: 25 INJECTION INTRAMUSCULAR; INTRAVENOUS at 22:13

## 2022-05-24 RX ADMIN — DIPHENHYDRAMINE HYDROCHLORIDE 25 MG: 50 INJECTION, SOLUTION INTRAMUSCULAR; INTRAVENOUS at 20:58

## 2022-05-24 RX ADMIN — ALUMINUM HYDROXIDE, MAGNESIUM HYDROXIDE, AND SIMETHICONE 30 ML: 200; 200; 20 SUSPENSION ORAL at 06:11

## 2022-05-24 ASSESSMENT — ENCOUNTER SYMPTOMS
NAUSEA: 1
BACK PAIN: 0
RHINORRHEA: 0
PHOTOPHOBIA: 0
DIARRHEA: 0
DIARRHEA: 1
SHORTNESS OF BREATH: 0
WHEEZING: 0
NAUSEA: 1
VOMITING: 0
VOMITING: 1
ABDOMINAL PAIN: 1
COUGH: 0
ABDOMINAL PAIN: 1
SHORTNESS OF BREATH: 0
COUGH: 0
BLOOD IN STOOL: 0

## 2022-05-24 ASSESSMENT — PAIN - FUNCTIONAL ASSESSMENT: PAIN_FUNCTIONAL_ASSESSMENT: 0-10

## 2022-05-24 ASSESSMENT — PAIN SCALES - GENERAL: PAINLEVEL_OUTOF10: 7

## 2022-05-24 NOTE — ED PROVIDER NOTES
[address] [facility]     Emergency Department     Faculty Attestation    I performed a history and physical examination of the patient and discussed management with the resident. I have reviewed and agree with the residents findings including all diagnostic interpretations, and treatment plans as written. Any areas of disagreement are noted on the chart. I was personally present for the key portions of any procedures. I have documented in the chart those procedures where I was not present during the key portions. I have reviewed the emergency nurses triage note. I agree with the chief complaint, past medical history, past surgical history, allergies, medications, social and family history as documented unless otherwise noted below. Documentation of the HPI, Physical Exam and Medical Decision Making performed by scribmargo is based on my personal performance of the HPI, PE and MDM. For Physician Assistant/ Nurse Practitioner cases/documentation I have personally evaluated this patient and have completed at least one if not all key elements of the E/M (history, physical exam, and MDM). Additional findings are as noted. 33 yo M c/o cp \ llq abdominal pain, nausea, no vomit,   No suicidal or homicidal ideation, c/o anxiety,   No fever, no injury,   pe vss gcs 15 cong, neck supple with full rom,   Chest non tender, abdomen non tender, no distension, no rigidity  No calf tenderness, no calf swelling,     Trop <6, wbc 10.9, lipase 29, vss     EKG Interpretation    Interpreted by me  Normal sinus, heart rate 70, no ischemia, normal axis, QT corrected 408    CRITICAL CARE: There was a high probability of clinically significant/life threatening deterioration in this patient's condition which required my urgent intervention. Total critical care time was 5 minutes. This excludes any time for separately reportable procedures.        Roly 24, DO  05/24/22 Marcy Devries 16, DO  05/24/22 5503

## 2022-05-24 NOTE — ED NOTES
Patient out of his room stating that he had a family emergency and needed to leave. Patient removed his own IV and placed it on the counter, left ED. Dr. Bruce Faith notified.      Poonam Schultz RN  05/24/22 9866
No

## 2022-05-24 NOTE — ED PROVIDER NOTES
EMERGENCY DEPARTMENT ENCOUNTER    Pt Name: Lila Rutherford  MRN: [id number]  [place] 1990  Date of evaluation: 5/24/22  CHIEF COMPLAINT       Chief Complaint   Patient presents with    Abdominal Pain    Anxiety     x 4 days     HISTORY OF PRESENT ILLNESS   This is a 27-year-old male that presents with complaints of anxiety and nausea. Patient states that over the past few days he has been having some nausea and anxiety, this is been ongoing over the past 4 days. He describes his symptoms as severe, without any specific aggravating or alleviating factors.               REVIEW OF SYSTEMS     Review of Systems  PASTMEDICAL HISTORY     Past Medical History:   Diagnosis Date    Anxiety     Arthritis     Asthma     Bipolar I disorder, most recent episode (or current) depressed, unspecified 9/12/2014    Clostridium difficile infection     COPD (chronic obstructive pulmonary disease) ([address]     Depression     Disease of blood and blood forming organ     Eczema     Fracture, metacarpal     R 4th and 5th    Gastric ulcer     Gastritis 06/13/2018    GERD (gastroesophageal reflux disease)     GI bleed     H. 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[address]     Substance abuse ([address]     Hx of opiates, benzos, cocaine, alcohol abuse    Suicidal ideation     Suicide attempt by hanging ([address]     Tobacco dependence     Ulcerative colitis ([address]     UTI (urinary tract infection)      Past Problem List  Patient Active Problem List   Diagnosis Code    Opioid abuse ([address] F11.10    Noncompliance Z91.19    Rectal bleeding K62.5    Smoker F17.200    Juvenile rheumatoid arthritis (HCC) M08.00    SRIRAM (obstructive sleep apnea) G47.33    Primary insomnia F51.01    Calculus of bile duct with acute on chronic cholecystitis K80.46    Mild intermittent asthma without complication P82.82    Gastritis K29.70    Generalized abdominal pain R10.84    Anemia D64.9    Elevated liver enzymes R74.8    Nausea and vomiting R11.2    Opioid type dependence, continuous ([facility]) F11.20    Abdominal pain R10.9    Diarrhea R19.7    Irritable bowel syndrome with both constipation and diarrhea K58.2    Schizoaffective disorder, bipolar type ([facility]) F25.0    GI bleed K92.2    Depression with suicidal ideation F32. A, R45.851    Schizoaffective disorder ([address] F25.9    MDD (major depressive disorder), recurrent severe, without psychosis ([address] F33.2    Severe episode of recurrent major depressive disorder, without psychotic features ([address] F33.2    Diabetes mellitus type 2 in obese ([address] E11.69, E66.9    Mixed hyperlipidemia E78.2     SURGICAL HISTORY       Past Surgical History:   Procedure Laterality Date    ABDOMEN SURGERY      upper GI scope 7/7/2015    BRONCHOSCOPY      CHOLECYSTECTOMY, LAPAROSCOPIC  07/14/2017    surgery performed at [address] [place] [address], COLON, DIAGNOSTIC      OTHER SURGICAL HISTORY      lumbar puncture    NC COLONOSCOPY W/BIOPSY SINGLE/MULTIPLE  8/9/2017    COLONOSCOPY WITH BIOPSY performed by Ana Kaur [facility] at [facility]    NC EGD TRANSORAL BIOPSY SINGLE/MULTIPLE N/A 3/20/2017    EGD BIOPSY performed by Chente Vee MD at [facility]    NC ESOPHAGOGASTRODUODENOSCOPY TRANSORAL DIAGNOSTIC N/A 8/9/2017    EGD ESOPHAGOGASTRODUODENOSCOPY performed by Ana Kaur MD at [address] [place] [address] N/A 3/20/2017    SIGMOIDOSCOPY DIAGNOSTIC FLEXIBLE performed by Chente Vee MD at [address] [place] [address]  2/4/16    UPPER GASTROINTESTINAL ENDOSCOPY N/A 6/13/2018    GASTRITIS    UPPER GASTROINTESTINAL ENDOSCOPY N/A 9/20/2018    EGD BIOPSY performed by Jenn Castellanos MD at [address] [place] [address]       Discharge Medication List as of 5/24/2022  3:26 AM      CONTINUE these medications which have NOT CHANGED    Details   ALPRAZolam (XANAX) 0.5 MG tablet Take 0.5 mg by mouth nightly as needed for Sleep. Historical Med      polyethylene glycol (MIRALAX) 17 g packet Take 17 g by mouth daily as needed for Constipation, Disp-527 g, R-1Print      Psyllium-Calcium (METAMUCIL PLUS CALCIUM) CAPS Take 1 capsule by mouth daily Take with 8 oz water., Disp-120 capsule, R-0Print      ondansetron (ZOFRAN ODT) 4 MG disintegrating tablet Take 1 tablet by mouth every 8 hours as needed for Nausea or Vomiting, Disp-5 tablet, R-0Normal      diphenhydrAMINE (BENADRYL) 25 MG capsule Take 1 capsule by mouth nightly as needed for Itching, Disp-4 capsule, R-0Print      aluminum-magnesium hydroxide 200-200 MG/5ML suspension Take 10 mLs by mouth every 6 hours as needed for Indigestion, Disp-710 mL, R-mlPrint      traZODone (DESYREL) 50 MG tablet Take 1 tablet by mouth nightly as needed for Sleep, Disp-30 tablet, R-0Normal      DULoxetine (CYMBALTA) 20 MG extended release capsule Take 1 capsule by mouth daily, Disp-30 capsule, R-0Normal           ALLERGIES     is allergic to dicyclomine, famotidine, geodon [ziprasidone hcl], haloperidol, iv dye [iodides], reglan [metoclopramide], ibuprofen, aspirin, dicyclomine hcl, hydroxyzine hcl, iodine, metronidazole, mirtazapine, promethazine, and ziprasidone. FAMILY HISTORY     He indicated that his mother is alive. He indicated that his father is alive. He indicated that the status of his sister is unknown. He indicated that the status of his other is unknown. He indicated that the status of his paternal cousin is unknown.      SOCIAL HISTORY       Social History     Tobacco Use    Smoking status: Current Every Day Smoker Packs/day: 0.50     Years: 15.00     Pack years: 7.50     Types: Cigarettes    Smokeless tobacco: Never Used   Vaping Use    Vaping Use: Former   Substance Use Topics    Alcohol use: Yes     Comment: drinks daily    Drug use: Not Currently     Types: Opiates , Cocaine     Comment: Hx of opiates, benzos, cocaine, alcohol abuse     PHYSICAL EXAM     INITIAL VITALS: BP (!) 154/96   Pulse 85   Temp 98.1 °F (36.7 °C) (Oral)   Resp 16   SpO2 99%    Physical Exam  Constitutional:       Appearance: Normal appearance. He is well-developed. He is not ill-appearing or toxic-appearing. HENT:      Head: Normocephalic and atraumatic. Eyes:      Conjunctiva/sclera: Conjunctivae normal.      Pupils: Pupils are equal, round, and reactive to light. Neck:      Trachea: Trachea normal.   Cardiovascular:      Rate and Rhythm: Normal rate and regular rhythm. Heart sounds: S1 normal and S2 normal. No murmur heard. Pulmonary:      Effort: Pulmonary effort is normal. No accessory muscle usage or respiratory distress. Breath sounds: Normal breath sounds. Chest:      Chest wall: No deformity or tenderness. Abdominal:      General: Bowel sounds are normal. There is no distension or abdominal bruit. Palpations: Abdomen is not rigid. Tenderness: There is no abdominal tenderness. There is no guarding or rebound. Musculoskeletal:      Cervical back: Normal range of motion and neck supple. Skin:     General: Skin is warm. Findings: No rash. Neurological:      Mental Status: He is alert and oriented to person, place, and time. GCS: GCS eye subscore is 4. GCS verbal subscore is 5. GCS motor subscore is 6. Psychiatric:         Speech: Speech normal.         MEDICAL DECISION MAKING:   Patient presented with complaints of anxiety and abdominal pain, his abdominal exam was completely benign. He refused Zofran, he was given a milligram of Ativan and will be discharged with outpatient follow-up. I do not believe he has acute appendicitis or other intraperitoneal pathology. Patient has recurrent visits to the hospital for abdominal pain. CRITICAL CARE:       PROCEDURES:    Procedures    DIAGNOSTIC RESULTS   EKG:All EKG's are interpreted by the Emergency Department Physician who either signs or Co-signs this chart in the absence of a cardiologist.        RADIOLOGY:All plain film, CT, MRI, and formal ultrasound images (except ED bedside ultrasound) are read by the radiologist, see reports below, unless otherwisenoted in MDM or here. No orders to display     LABS: All lab results were reviewed by myself, and all abnormals are listed below. Labs Reviewed - No data to display    EMERGENCY DEPARTMENTCOURSE:         Vitals:    Vitals:    05/24/22 0247   BP: (!) 154/96   Pulse: 85   Resp: 16   Temp: 98.1 °F (36.7 °C)   TempSrc: Oral   SpO2: 99%       The patient was given the following medications while in the emergency department:  Orders Placed This Encounter   Medications    LORazepam (ATIVAN) tablet 1 mg    DISCONTD: ondansetron (ZOFRAN-ODT) disintegrating tablet 4 mg     CONSULTS:  None    FINAL IMPRESSION      1. Anxiety          DISPOSITION/PLAN   DISPOSITION Decision To Discharge 05/24/2022 03:26:03 AM      PATIENT REFERRED TO:  [place] [address]  [phone]  Schedule an appointment as soon as possible for a visit in 2 days      DISCHARGE MEDICATIONS:  Discharge Medication List as of 5/24/2022  3:26 AM        The care is provided during an unprecedented national emergency due to the novel coronavirus, COVID 19.   MD Clay Medrano MD  05/27/22 6018

## 2022-05-24 NOTE — ED PROVIDER NOTES
[address] ED  Emergency Department Encounter  EmergencyMedicine Resident     Pt Ravi Rucker  MRN: [id number]  Darcigfmohinder 1990  Date of evaluation: 5/24/22  PCP:  No primary care provider on file. This patient was evaluated in the Emergency Department for symptoms described in the history of present illness. The patient was evaluated in the context of the global COVID-19 pandemic, which necessitated consideration that the patient might be at risk for infection with the SARS-CoV-2 virus that causes COVID-19. Institutional protocols and algorithms that pertain to the evaluation of patients at risk for COVID-19 are in a state of rapid change based on information released by regulatory bodies including the CDC and federal and state organizations. These policies and algorithms were followed during the patient's care in the ED. CHIEF COMPLAINT       Chief Complaint   Patient presents with    Anxiety    Abdominal Pain    Chest Pain       HISTORY OF PRESENT ILLNESS  (Location/Symptom, Timing/Onset, Context/Setting, Quality, Duration, Modifying Factors, Severity.)      Evan Onofre is a 32 y.o. male who presents with complaints of chest pain, shortness of breath, abdominal pain that is been intermittent and episodic lasting a few minutes at a time over the past couple days. Denied any SI or HI. Does have history of bipolar disorder and depression. Patient requesting Phenergan by name and has had history of seeking behavior in the past per chart review. No fevers, chills. Does note 3 episodes of nonbloody emesis and 3 episodes of diarrhea over the past couple days as well. Notes abdominal pain that is worse left lower quadrant.     PAST MEDICAL / SURGICAL / SOCIAL / FAMILY HISTORY      has a past medical history of Anxiety, Arthritis, Asthma, Bipolar I disorder, most recent episode (or current) depressed, unspecified, Clostridium difficile infection, COPD (chronic obstructive pulmonary disease) ([facility] [address] Depression, Disease of blood and blood forming organ, Eczema, Fracture, metacarpal, Gastric ulcer, Gastritis, GERD (gastroesophageal reflux disease), GI bleed, H. pylori infection, H/O blood clots, Head injury, Headache, Insomnia, Juvenile rheumatoid arthritis ([facility] [address] Neuromuscular disorder (HCC), PFAPA syndrome ([facility] [address] PUD (peptic ulcer disease), Rheumatoid arthritis ([facility] [address] Rheumatoid arthritis(714.0), Severe recurrent major depression without psychotic features ([facility] [address] Sleep apnea, 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[facility] [address] Substance abuse ([facility] Suicidal ideation, Suicide attempt by hanging ([facility] Tobacco dependence, Ulcerative colitis ([facility] and UTI (urinary tract infection). Reviewed with patient     has a past surgical history that includes Colonoscopy; bronchoscopy; other surgical history; Upper gastrointestinal endoscopy (2/4/16); pr egd transoral biopsy single/multiple (N/A, 3/20/2017); sigmoidoscopy (N/A, 3/20/2017); Cholecystectomy, laparoscopic (07/14/2017); pr esophagogastroduodenoscopy transoral diagnostic (N/A, 8/9/2017); pr colonoscopy w/biopsy single/multiple (8/9/2017); Abdomen surgery; Upper gastrointestinal endoscopy (N/A, 6/13/2018); Upper gastrointestinal endoscopy (N/A, 9/20/2018); and Endoscopy, colon, diagnostic.   Reviewed with patient    Social History     Socioeconomic History    Marital status: Single     Spouse name: Not on file    Number of children: Not on file    Years of education: Not on file    Highest education level: Not on file   Occupational History    Occupation: disability   Tobacco Use    Smoking status: Current Every Day Smoker     Packs/day: 0.50     Years: 15.00     Pack years: 7.50     Types: Cigarettes    Smokeless tobacco: Never Used   Vaping Use    Vaping Use: Former   Substance and Sexual Activity    Alcohol use: Yes     Comment: drinks daily    Drug use: Not Currently     Types: Opiates , Cocaine     Comment: Hx of opiates, benzos, cocaine, alcohol abuse    Sexual activity: Yes     Partners: Female     Comment: Lives alone, not working. Other Topics Concern    Not on file   Social History Narrative    ** Merged History Encounter **          Social Determinants of Health     Financial Resource Strain: Medium Risk    Difficulty of Paying Living Expenses: Somewhat hard   Food Insecurity: No Food Insecurity    Worried About Running Out of Food in the Last Year: Never true    [place] of Food in the Last Year: Never true   Transportation Needs: No Transportation Needs    Lack of Transportation (Medical): No    Lack of Transportation (Non-Medical):  No   Physical Activity: Inactive    Days of Exercise per Week: 0 days    Minutes of Exercise per Session: 0 min   Stress: Stress Concern Present    Feeling of Stress : Rather much   Social Connections: Socially Isolated    Frequency of Communication with Friends and Family: Never    Frequency of Social Gatherings with Friends and Family: Never    Attends Roman [employer] Services: Never    Active Member of Clubs or Organizations: No    Attends Club or Organization Meetings: Never    Marital Status: Never    Intimate Partner Violence: Not At Risk    Fear of Current or Ex-Partner: No    Emotionally Abused: No    Physically Abused: No    Sexually Abused: No   Housing Stability: Low Risk     Unable to Pay for Housing in the Last Year: No    Number of [place] in the Last Year: 1    Unstable Housing in the Last Year: No       Family History   Problem Relation Age of Onset    Diabetes Father     Alcohol Abuse Father     Depression Father     Arthritis Father     High Blood Pressure Father     Other Father         aneurysm & epilepsy    Migraines Father     Arthritis Mother     Other Mother         aneurysm & epilepsy    Migraines Mother     Diabetes Brother         Aunt and uncles    Depression Brother     Mental Illness Brother     Other Brother         epilepsy    Migraines Brother  Stroke Other         Uncle    Other Brother         murdered Oct 6th, 2014    Colon Cancer Paternal Cousin 43    Other Sister         epilepsy   Connye Sole Migraines Sister        Allergies:  Dicyclomine, Famotidine, Geodon [ziprasidone hcl], Haloperidol, Iv dye [iodides], Reglan [metoclopramide], Ibuprofen, Aspirin, Dicyclomine hcl, Hydroxyzine hcl, Iodine, Metronidazole, Mirtazapine, Promethazine, and Ziprasidone    Home Medications:  Prior to Admission medications    Medication Sig Start Date End Date Taking? Authorizing Provider   ALPRAZolam Unice Nigh) 0.5 MG tablet Take 0.5 mg by mouth nightly as needed for Sleep. Historical Provider, MD   polyethylene glycol (MIRALAX) 17 g packet Take 17 g by mouth daily as needed for Constipation  Patient not taking: Reported on 5/24/2022 5/5/22 6/4/22  Jannet Mathews MD   Psyllium-Calcium (METAMUCIL PLUS CALCIUM) CAPS Take 1 capsule by mouth daily Take with 8 oz water.   Patient not taking: Reported on 5/24/2022 5/5/22   Jannet Mathews MD   diphenhydrAMINE (BENADRYL) 25 MG capsule Take 1 capsule by mouth nightly as needed for Itching  Patient not taking: Reported on 5/24/2022 5/1/22   Juice Hernandez,    ondansetron (ZOFRAN ODT) 4 MG disintegrating tablet Take 1 tablet by mouth every 8 hours as needed for Nausea or Vomiting  Patient not taking: Reported on 5/24/2022 5/1/22   Verona Sheikh DO   aluminum-magnesium hydroxide 200-200 MG/5ML suspension Take 10 mLs by mouth every 6 hours as needed for Indigestion 4/22/22   Tianna Baig DO   traZODone (DESYREL) 50 MG tablet Take 1 tablet by mouth nightly as needed for Sleep  Patient not taking: Reported on 5/24/2022 4/8/22   Marj Palma MD   DULoxetine (CYMBALTA) 20 MG extended release capsule Take 1 capsule by mouth daily  Patient not taking: Reported on 5/24/2022 4/8/22   Marj Palma MD   meloxicam (MOBIC) 7.5 MG tablet Take 1 tablet by mouth 2 times daily as needed for Pain 8/19/21 10/30/21 Samson Horowitz, APRN - CNP       REVIEW OF SYSTEMS    (2-9 systems for level 4, 10 or more for level 5)      Review of Systems   Constitutional: Negative for chills and fever. HENT: Negative for congestion and rhinorrhea. Eyes: Negative for photophobia and visual disturbance. Respiratory: Negative for cough, shortness of breath and wheezing. Cardiovascular: Positive for chest pain. Gastrointestinal: Positive for abdominal pain, diarrhea, nausea and vomiting. Genitourinary: Negative for dysuria and frequency. Musculoskeletal: Negative for back pain and neck pain. Skin: Negative for rash and wound. Neurological: Negative for dizziness and headaches. PHYSICAL EXAM   (up to 7 for level 4, 8 or more for level 5)      INITIAL VITALS:   BP (!) 141/82   Pulse 76   Temp 97.6 °F (36.4 °C)   Resp 18   SpO2 97%     Physical Exam  Vitals and nursing note reviewed. Constitutional:       General: He is not in acute distress. HENT:      Head: Atraumatic. Right Ear: External ear normal.      Left Ear: External ear normal.      Mouth/Throat:      Mouth: Mucous membranes are moist.      Pharynx: Oropharynx is clear. Eyes:      Conjunctiva/sclera: Conjunctivae normal.   Cardiovascular:      Rate and Rhythm: Normal rate and regular rhythm. Pulmonary:      Effort: Pulmonary effort is normal. No respiratory distress. Breath sounds: Normal breath sounds. Abdominal:      Palpations: Abdomen is soft. Tenderness: There is abdominal tenderness. There is no guarding or rebound. Musculoskeletal:         General: Normal range of motion. Cervical back: Normal range of motion. Skin:     General: Skin is warm and dry. Capillary Refill: Capillary refill takes less than 2 seconds. Neurological:      General: No focal deficit present. Mental Status: He is alert and oriented to person, place, and time.          DIFFERENTIAL  DIAGNOSIS     PLAN (LABS / IMAGING / EKG):  Orders Placed This Encounter   Procedures    XR CHEST PORTABLE    CBC with Auto Differential    Troponin    Lipase    EKG 12 Lead       MEDICATIONS ORDERED:  Orders Placed This Encounter   Medications    0.9 % sodium chloride bolus    aluminum & magnesium hydroxide-simethicone (MAALOX) 200-200-20 MG/5ML suspension 30 mL    diphenhydrAMINE (BENADRYL) injection 12.5 mg       DDX: ACS, arrhythmia, pancreatitis, gastroenteritis    DIAGNOSTIC RESULTS / EMERGENCY DEPARTMENT COURSE / MDM   LAB RESULTS:  Results for orders placed or performed during the hospital encounter of 05/24/22   CBC with Auto Differential   Result Value Ref Range    WBC 10.9 3.5 - 11.3 k/uL    RBC 4.53 4.21 - 5.77 m/uL    Hemoglobin 13.7 13.0 - 17.0 g/dL    Hematocrit 40.9 40.7 - 50.3 %    MCV 90.3 82.6 - 102.9 fL    MCH 30.2 25.2 - 33.5 pg    MCHC 33.5 28.4 - 34.8 g/dL    RDW 13.7 11.8 - 14.4 %    Platelets 707 260 - 275 k/uL    MPV 9.6 8.1 - 13.5 fL    NRBC Automated 0.0 0.0 per 100 WBC    Seg Neutrophils 70 (H) 36 - 65 %    Lymphocytes 24 24 - 43 %    Monocytes 5 3 - 12 %    Eosinophils % 1 1 - 4 %    Basophils 0 0 - 2 %    Immature Granulocytes 0 0 %    Segs Absolute 7.60 1.50 - 8.10 k/uL    Absolute Lymph # 2.58 1.10 - 3.70 k/uL    Absolute Mono # 0.53 0.10 - 1.20 k/uL    Absolute Eos # 0.09 0.00 - 0.44 k/uL    Basophils Absolute 0.03 0.00 - 0.20 k/uL    Absolute Immature Granulocyte 0.03 0.00 - 0.30 k/uL   Troponin   Result Value Ref Range    Troponin, High Sensitivity <6 0 - 22 ng/L   Lipase   Result Value Ref Range    Lipase 29 13 - 60 U/L       IMPRESSION: Chest pain    RADIOLOGY:  XR CHEST PORTABLE   Final Result   Normal examination.              EKG  EKG Interpretation  NSR without ST changes    Mikayla Dawkins MD    All EKG's are interpreted by the Emergency Department Physician who either signs or Co-signs this chart in the absence of a cardiologist.    EMERGENCY DEPARTMENT COURSE:  19-year-old male, history of depression, bipolar disorder, presented to ED with complaints of abdominal pain, chest pain, shortness of breath comes altogether and lasts a few minutes at a time and resolve spontaneously. Has also had some nausea, vomiting, diarrhea over the past couple days. No fevers. On exam, afebrile, nontachycardic, lungs clear, abdomen soft and generalized tenderness noted. Patient requesting Phenergan by name and has multiple allergies to antiemetics and has demonstrated seeking behavior in past.  Plan to try Maalox. EKG normal sinus rhythm. Plan to check Trope, chest x-ray, lipase. Also give fluids. ED Course as of 05/24/22 0739   Tue May 24, 2022   0608 WBC: 10.9 [AR]   0629 Troponin, High Sensitivity: <6 [AR]   0630 Lipase: 29 [AR]   4174 Eloped prior to CXR result [AR]      ED Course User Index  [AR] Sanjay Patiño MD       No notes of EC Admission Criteria type on file. PROCEDURES:  None    CONSULTS:  None    CRITICAL CARE:  None    FINAL IMPRESSION      1. Chest pain, unspecified type    2. Abdominal pain, unspecified abdominal location    3. Eloped from emergency department          DISPOSITION / PLAN     DISPOSITION  Eloped      PATIENT REFERRED TO:  No follow-up provider specified.     DISCHARGE MEDICATIONS:  Discharge Medication List as of 5/24/2022  7:03 AM          Laura Almeida MD  Emergency Medicine Resident    (Please note that portions of thisnote were completed with a voice recognition program.  Efforts were made to edit the dictations but occasionally words are mis-transcribed.)     Sanjay Patiño MD  Resident  05/24/22 [id number]

## 2022-05-24 NOTE — ED NOTES
Pt presenting to the Ed with complaints of anxiety and abdominal pain. Pt states his anxiety is causing his stomach to hurt and to be nauseous. Pt states he is taking 0.5 mg of Xanax, and he thinks his dose needs to be increased. Pt states he is having trouble sleeping because of the anxiety. Pt denies any pain.        Anju Guidry RN  05/24/22 [id number]

## 2022-05-25 LAB
EKG ATRIAL RATE: 102 BPM
EKG ATRIAL RATE: 70 BPM
EKG P AXIS: 61 DEGREES
EKG P AXIS: 64 DEGREES
EKG P-R INTERVAL: 128 MS
EKG P-R INTERVAL: 152 MS
EKG Q-T INTERVAL: 330 MS
EKG Q-T INTERVAL: 378 MS
EKG QRS DURATION: 72 MS
EKG QRS DURATION: 84 MS
EKG QTC CALCULATION (BAZETT): 408 MS
EKG QTC CALCULATION (BAZETT): 430 MS
EKG R AXIS: 44 DEGREES
EKG R AXIS: 52 DEGREES
EKG T AXIS: 37 DEGREES
EKG T AXIS: 38 DEGREES
EKG VENTRICULAR RATE: 102 BPM
EKG VENTRICULAR RATE: 70 BPM

## 2022-05-25 PROCEDURE — 93010 ELECTROCARDIOGRAM REPORT: CPT | Performed by: INTERNAL MEDICINE
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
## 2022-05-25 NOTE — ED PROVIDER NOTES
[facility]     Emergency Department     Faculty Attestation    I performed a history and physical examination of the patient and discussed management with the resident. I reviewed the resident´s note and agree with the documented findings and plan of care. Any areas of disagreement are noted on the chart. I was personally present for the key portions of any procedures. I have documented in the chart those procedures where I was not present during the key portions. I have reviewed the emergency nurses triage note. I agree with the chief complaint, past medical history, past surgical history, allergies, medications, social and family history as documented unless otherwise noted below. For Physician Assistant/ Nurse Practitioner cases/documentation I have personally evaluated this patient and have completed at least one if not all key elements of the E/M (history, physical exam, and MDM). Additional findings are as noted.        Maura Tapia MD  05/25/22 1631       EKG Interpretation    Interpreted by emergency department physician    Rhythm: normal sinus   Rate: 102  Axis: normal/52  Ectopy: none  Conduction: normal  ST Segments: no acute change  T Waves: no acute change  Q Waves: none    Clinical Impression: Normal EKG except for slight sinus tachycardia    Maura Tapia, III       Marua Tapia MD  05/25/22 [id number]

## 2022-05-25 NOTE — ED NOTES
Pt arrived to ED with c/o of abdominal pain, anxiety and states his heart feels like it was fluttering. Pt was seen here yesterday. Pt does not appear to be in distress upon assessment. Pt denies N/V/D. Pt was placed on cardiac monitor, EKG done at bedside.      Andrew Ruffin RN  05/24/22 [id number]

## 2022-05-26 ENCOUNTER — HOSPITAL ENCOUNTER (EMERGENCY)
Age: 32
Discharge: HOME | End: 2022-05-26
Attending: EMERGENCY MEDICINE
Payer: COMMERCIAL

## 2022-05-26 VITALS
RESPIRATION RATE: 19 BRPM | HEART RATE: 82 BPM | SYSTOLIC BLOOD PRESSURE: 135 MMHG | DIASTOLIC BLOOD PRESSURE: 79 MMHG | OXYGEN SATURATION: 99 % | TEMPERATURE: 98.6 F

## 2022-05-26 DIAGNOSIS — R10.9 CHRONIC ABDOMINAL PAIN: ICD-10-CM

## 2022-05-26 DIAGNOSIS — L29.9 PRURITUS: Primary | ICD-10-CM

## 2022-05-26 DIAGNOSIS — G89.29 CHRONIC ABDOMINAL PAIN: ICD-10-CM

## 2022-05-26 PROCEDURE — 96372 THER/PROPH/DIAG INJ SC/IM: CPT

## 2022-05-26 PROCEDURE — 99284 EMERGENCY DEPT VISIT MOD MDM: CPT

## 2022-05-26 PROCEDURE — 6360000002 HC RX W HCPCS: Performed by: GENERAL PRACTICE

## 2022-05-26 RX ORDER — DIPHENHYDRAMINE HYDROCHLORIDE 50 MG/ML
25 INJECTION INTRAMUSCULAR; INTRAVENOUS ONCE
Status: COMPLETED | OUTPATIENT
Start: 2022-05-26 | End: 2022-05-26

## 2022-05-26 RX ORDER — DIPHENHYDRAMINE HCL 25 MG
25 TABLET ORAL ONCE
Status: DISCONTINUED | OUTPATIENT
Start: 2022-05-26 | End: 2022-05-26

## 2022-05-26 RX ORDER — DIPHENHYDRAMINE HCL 25 MG
25 CAPSULE ORAL 2 TIMES DAILY PRN
Qty: 6 CAPSULE | Refills: 0 | Status: ON HOLD | OUTPATIENT
Start: 2022-05-26 | End: 2022-05-30 | Stop reason: HOSPADM

## 2022-05-26 RX ADMIN — DIPHENHYDRAMINE HYDROCHLORIDE 25 MG: 50 INJECTION, SOLUTION INTRAMUSCULAR; INTRAVENOUS at 19:20

## 2022-05-26 ASSESSMENT — PAIN SCALES - GENERAL: PAINLEVEL_OUTOF10: 7

## 2022-05-26 ASSESSMENT — PAIN - FUNCTIONAL ASSESSMENT: PAIN_FUNCTIONAL_ASSESSMENT: 0-10

## 2022-05-26 ASSESSMENT — ENCOUNTER SYMPTOMS
SHORTNESS OF BREATH: 0
ABDOMINAL PAIN: 1
VOMITING: 0
NAUSEA: 0

## 2022-05-26 NOTE — ED TRIAGE NOTES
Pt. Arrives to ER from triage for c/o skin burning sensation after drinking coffee. Pt. Has been waiting in triage for transportation since 8am and obtained coffee, thinking reaction to drink.

## 2022-05-26 NOTE — ED PROVIDER NOTES
[facility] ED     Emergency Department     Faculty Attestation        I performed a history and physical examination of the patient and discussed management with the resident. I reviewed the residents note and agree with the documented findings and plan of care. Any areas of disagreement are noted on the chart. I was personally present for the key portions of any procedures. I have documented in the chart those procedures where I was not present during the key portions. I have reviewed the emergency nurses triage note. I agree with the chief complaint, past medical history, past surgical history, allergies, medications, social and family history as documented unless otherwise noted below. For Physician Assistant/ Nurse Practitioner cases/documentation I have personally evaluated this patient and have completed at least one if not all key elements of the E/M (history, physical exam, and MDM). Additional findings are as noted. PCP:  No primary care provider on file. Pertinent Comments:         Critical Care  None    This patient was evaluated in the Emergency Department for symptoms described in the history of present illness. He/she was evaluated in the context of the global COVID-19 pandemic, which necessitated consideration that the patient might be at risk for infection with the SARS-CoV-2 virus that causes COVID-19. Institutional protocols and algorithms that pertain to the evaluation of patients at risk for COVID-19 are in a state of rapid change based on information released by regulatory bodies including the CDC and federal and state organizations. These policies and algorithms were followed during the patient's care in the ED. (Please note that portions of this note were completed with a voice recognition program. Efforts were made to edit the dictations but occasionally words are mis-transcribed.  Whenever words are used in this note in any gender, they shall be construed as though they were used in the gender appropriate to the circumstances; and whenever words are used in this note in the singular or plural form, they shall be construed as though they were used in the form appropriate to the circumstances.)    MD Silvestre Merlos  Attending Emergency Medicine Physician             Elise Snellen, MD  05/26/22 [id number]
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
## 2022-05-28 ENCOUNTER — HOSPITAL ENCOUNTER (EMERGENCY)
Age: 32
Discharge: LEFT BEFORE DISCHARGE | End: 2022-05-28
Attending: EMERGENCY MEDICINE

## 2022-05-28 ENCOUNTER — HOSPITAL ENCOUNTER (INPATIENT)
Age: 32
LOS: 3 days | Discharge: HOME | DRG: 881 | End: 2022-05-31
Attending: EMERGENCY MEDICINE | Admitting: PSYCHIATRY & NEUROLOGY
Payer: COMMERCIAL

## 2022-05-28 ENCOUNTER — HOSPITAL ENCOUNTER (EMERGENCY)
Age: 32
Discharge: LEFT BEFORE DISCHARGE | End: 2022-05-28
Attending: EMERGENCY MEDICINE
Payer: COMMERCIAL

## 2022-05-28 VITALS
SYSTOLIC BLOOD PRESSURE: 117 MMHG | OXYGEN SATURATION: 100 % | HEART RATE: 76 BPM | DIASTOLIC BLOOD PRESSURE: 70 MMHG | HEIGHT: 69 IN | TEMPERATURE: 97.2 F | RESPIRATION RATE: 16 BRPM | WEIGHT: 205 LBS | BODY MASS INDEX: 30.36 KG/M2

## 2022-05-28 DIAGNOSIS — Z53.21 ELOPED FROM EMERGENCY DEPARTMENT: Primary | ICD-10-CM

## 2022-05-28 DIAGNOSIS — F25.0 SCHIZOAFFECTIVE DISORDER, BIPOLAR TYPE (HCC): Primary | ICD-10-CM

## 2022-05-28 DIAGNOSIS — R69 DIAGNOSIS UNKNOWN: Primary | ICD-10-CM

## 2022-05-28 DIAGNOSIS — F11.10 OPIOID ABUSE (HCC): ICD-10-CM

## 2022-05-28 PROBLEM — F14.10 COCAINE ABUSE (HCC): Status: ACTIVE | Noted: 2022-05-28

## 2022-05-28 PROCEDURE — 6370000000 HC RX 637 (ALT 250 FOR IP): Performed by: PSYCHIATRY & NEUROLOGY

## 2022-05-28 PROCEDURE — APPSS180 APP SPLIT SHARED TIME > 60 MINUTES

## 2022-05-28 PROCEDURE — 90792 PSYCH DIAG EVAL W/MED SRVCS: CPT | Performed by: PSYCHIATRY & NEUROLOGY

## 2022-05-28 PROCEDURE — 99281 EMR DPT VST MAYX REQ PHY/QHP: CPT

## 2022-05-28 PROCEDURE — 99285 EMERGENCY DEPT VISIT HI MDM: CPT

## 2022-05-28 PROCEDURE — 1240000000 HC EMOTIONAL WELLNESS R&B

## 2022-05-28 PROCEDURE — 99223 1ST HOSP IP/OBS HIGH 75: CPT | Performed by: INTERNAL MEDICINE

## 2022-05-28 RX ORDER — DULOXETIN HYDROCHLORIDE 20 MG/1
20 CAPSULE, DELAYED RELEASE ORAL DAILY
Status: DISCONTINUED | OUTPATIENT
Start: 2022-05-28 | End: 2022-05-29

## 2022-05-28 RX ORDER — ONDANSETRON 2 MG/ML
4 INJECTION INTRAMUSCULAR; INTRAVENOUS ONCE
Status: DISCONTINUED | OUTPATIENT
Start: 2022-05-28 | End: 2022-05-28 | Stop reason: HOSPADM

## 2022-05-28 RX ORDER — DIPHENHYDRAMINE HCL 25 MG
25 TABLET ORAL ONCE
Status: DISCONTINUED | OUTPATIENT
Start: 2022-05-28 | End: 2022-05-28 | Stop reason: HOSPADM

## 2022-05-28 RX ORDER — CLONIDINE HYDROCHLORIDE 0.1 MG/1
0.1 TABLET ORAL EVERY 6 HOURS PRN
Status: DISCONTINUED | OUTPATIENT
Start: 2022-05-28 | End: 2022-05-31 | Stop reason: HOSPADM

## 2022-05-28 RX ORDER — MAGNESIUM HYDROXIDE/ALUMINUM HYDROXICE/SIMETHICONE 120; 1200; 1200 MG/30ML; MG/30ML; MG/30ML
30 SUSPENSION ORAL EVERY 6 HOURS PRN
Status: DISCONTINUED | OUTPATIENT
Start: 2022-05-28 | End: 2022-05-31 | Stop reason: HOSPADM

## 2022-05-28 RX ORDER — POLYETHYLENE GLYCOL 3350 17 G/17G
17 POWDER, FOR SOLUTION ORAL DAILY PRN
Status: DISCONTINUED | OUTPATIENT
Start: 2022-05-28 | End: 2022-05-31 | Stop reason: HOSPADM

## 2022-05-28 RX ORDER — TRAZODONE HYDROCHLORIDE 50 MG/1
50 TABLET ORAL NIGHTLY PRN
Status: DISCONTINUED | OUTPATIENT
Start: 2022-05-28 | End: 2022-05-28

## 2022-05-28 RX ORDER — ACETAMINOPHEN 325 MG/1
650 TABLET ORAL EVERY 4 HOURS PRN
Status: DISCONTINUED | OUTPATIENT
Start: 2022-05-28 | End: 2022-05-31 | Stop reason: HOSPADM

## 2022-05-28 RX ORDER — LOPERAMIDE HYDROCHLORIDE 2 MG/1
2 CAPSULE ORAL 4 TIMES DAILY PRN
Status: DISCONTINUED | OUTPATIENT
Start: 2022-05-28 | End: 2022-05-31 | Stop reason: HOSPADM

## 2022-05-28 RX ADMIN — NICOTINE POLACRILEX 2 MG: 2 GUM, CHEWING BUCCAL at 22:27

## 2022-05-28 RX ADMIN — NICOTINE POLACRILEX 2 MG: 2 GUM, CHEWING BUCCAL at 13:57

## 2022-05-28 RX ADMIN — CLONIDINE HYDROCHLORIDE 0.1 MG: 0.1 TABLET ORAL at 13:57

## 2022-05-28 RX ADMIN — NICOTINE POLACRILEX 2 MG: 2 GUM, CHEWING BUCCAL at 21:21

## 2022-05-28 RX ADMIN — NICOTINE POLACRILEX 2 MG: 2 GUM, CHEWING BUCCAL at 19:56

## 2022-05-28 RX ADMIN — NICOTINE POLACRILEX 2 MG: 2 GUM, CHEWING BUCCAL at 15:39

## 2022-05-28 RX ADMIN — DULOXETINE HYDROCHLORIDE 20 MG: 20 CAPSULE, DELAYED RELEASE ORAL at 13:50

## 2022-05-28 ASSESSMENT — PATIENT HEALTH QUESTIONNAIRE - PHQ9: SUM OF ALL RESPONSES TO PHQ QUESTIONS 1-9: 18

## 2022-05-28 ASSESSMENT — SLEEP AND FATIGUE QUESTIONNAIRES
DO YOU USE A SLEEP AID: NO
DO YOU HAVE DIFFICULTY SLEEPING: NO
AVERAGE NUMBER OF SLEEP HOURS: 7

## 2022-05-28 ASSESSMENT — PAIN SCALES - GENERAL: PAINLEVEL_OUTOF10: 0

## 2022-05-28 ASSESSMENT — PAIN - FUNCTIONAL ASSESSMENT
PAIN_FUNCTIONAL_ASSESSMENT: NONE - DENIES PAIN
PAIN_FUNCTIONAL_ASSESSMENT: NONE - DENIES PAIN

## 2022-05-28 ASSESSMENT — ENCOUNTER SYMPTOMS
ABDOMINAL PAIN: 0
COUGH: 0
BACK PAIN: 0

## 2022-05-28 ASSESSMENT — LIFESTYLE VARIABLES
HOW MANY STANDARD DRINKS CONTAINING ALCOHOL DO YOU HAVE ON A TYPICAL DAY: 5 OR 6
HOW OFTEN DO YOU HAVE A DRINK CONTAINING ALCOHOL: MONTHLY OR LESS

## 2022-05-28 NOTE — ED NOTES
Pt left AMA, refused to have labs drawn, only wanted medication to help with his anxiety        Jaimie Claire, FLORY  05/28/22 1640

## 2022-05-28 NOTE — ED PROVIDER NOTES
[address]      Pt Name: Janki Fonseca  MRN: [id number]  [place] 1990  Date of evaluation: 5/28/22      CHIEF COMPLAINT       Chief Complaint   Patient presents with    Suicidal     HISTORY OF PRESENT ILLNESS   HPI 32 y.o. male signs into our emergency department stating that he has been depressed and suicidal.  He reports to me that he has felt this way for 4 days. He says that he has been thinking of stabbing himself. He denies any other medical complaints to me, specifically he denies any abdominal pain, fever, cough, body aches. States that he is currently homeless, he states that he got kicked out of his girlfriend's house in his brother's house. States that he has not been taking any of his psychiatric medications over the last few weeks. He is supposed to be following at [facility]. The patient is actually had multiple recent emergency department visits over the last few days. The patient was actually just seen at [facility] emergency department a few hours ago and it is documented that he had specifically denied any suicidal or homicidal thoughts though now he stating these have been going on for 4 days. Also the patient has had multiple emergency department visits complaining of abdominal pain, but now the patient is denying any abdominal pain to me. Patient does have recent psychiatric admissions between the fifth and the 8 April. At that time he was treated for suicidal thoughts and depression. On his presentation he had reported a plan to stab himself similar to today. Prior to this he he had been admitted to the [facility] in January 2022     The denies any drug use. Denies any alcohol. Denies any intoxication. Denies any attempt at self-harm to this point. REVIEW OF SYSTEMS     Review of Systems   Constitutional: Negative for fever. HENT: Negative for congestion. Eyes: Negative for visual disturbance.    Respiratory: Negative for cough. Cardiovascular: Negative for chest pain. Gastrointestinal: Negative for abdominal pain. Genitourinary: Negative for difficulty urinating. Musculoskeletal: Negative for back pain. Skin: Negative for rash. Neurological: Negative for headaches. Psychiatric/Behavioral: Positive for decreased concentration, dysphoric mood, sleep disturbance and suicidal ideas. The patient is nervous/anxious.           PAST MEDICAL HISTORY     Past Medical History:   Diagnosis Date    Anxiety     Arthritis     Asthma     Bipolar I disorder, most recent episode (or current) depressed, unspecified 9/12/2014    Clostridium difficile infection     COPD (chronic obstructive pulmonary disease) ([address]     Depression     Disease of blood and blood forming organ     Eczema     Fracture, metacarpal     R 4th and 5th    Gastric ulcer     Gastritis 06/13/2018    GERD (gastroesophageal reflux disease)     GI bleed     H. 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[address]     Substance abuse ([address]     Hx of opiates, benzos, cocaine, alcohol abuse    Suicidal ideation     Suicide attempt by hanging ([address]     Tobacco dependence     Ulcerative colitis ([address]     UTI (urinary tract infection)        SURGICAL HISTORY       Past Surgical History:   Procedure Laterality Date    ABDOMEN SURGERY      upper GI scope 7/7/2015    BRONCHOSCOPY      CHOLECYSTECTOMY, LAPAROSCOPIC  07/14/2017    surgery performed at [address] [place] [address], COLON, DIAGNOSTIC      OTHER SURGICAL HISTORY      lumbar puncture    [place] COLONOSCOPY W/BIOPSY SINGLE/MULTIPLE  8/9/2017 COLONOSCOPY WITH BIOPSY performed by Suze Eugene MD at [facility]    [place] EGD TRANSORAL BIOPSY SINGLE/MULTIPLE N/A 3/20/2017    EGD BIOPSY performed by Pattie Guerra MD at [facility]    [place] ESOPHAGOGASTRODUODENOSCOPY TRANSORAL DIAGNOSTIC N/A 8/9/2017    EGD ESOPHAGOGASTRODUODENOSCOPY performed by Suze Eugene MD at [address] N/A 3/20/2017    [address] [facility] performed by Pattie Guerra MD at [facility]  2/4/16    UPPER GASTROINTESTINAL ENDOSCOPY N/A 6/13/2018    GASTRITIS    UPPER GASTROINTESTINAL ENDOSCOPY N/A 9/20/2018    EGD BIOPSY performed by Germán Atkins MD at [address] [facility]       Current Discharge Medication List      CONTINUE these medications which have NOT CHANGED    Details   diphenhydrAMINE (BENADRYL) 25 MG capsule Take 1 capsule by mouth 2 times daily as needed for Itching  Qty: 6 capsule, Refills: 0      ALPRAZolam (XANAX) 0.5 MG tablet Take 0.5 mg by mouth nightly as needed for Sleep.      polyethylene glycol (MIRALAX) 17 g packet Take 17 g by mouth daily as needed for Constipation  Qty: 527 g, Refills: 1      Psyllium-Calcium (METAMUCIL PLUS CALCIUM) CAPS Take 1 capsule by mouth daily Take with 8 oz water.   Qty: 120 capsule, Refills: 0      ondansetron (ZOFRAN ODT) 4 MG disintegrating tablet Take 1 tablet by mouth every 8 hours as needed for Nausea or Vomiting  Qty: 5 tablet, Refills: 0      aluminum-magnesium hydroxide 200-200 MG/5ML suspension Take 10 mLs by mouth every 6 hours as needed for Indigestion  Qty: 710 mL, Refills: ml      traZODone (DESYREL) 50 MG tablet Take 1 tablet by mouth nightly as needed for Sleep  Qty: 30 tablet, Refills: 0      DULoxetine (CYMBALTA) 20 MG extended release capsule Take 1 capsule by mouth daily  Qty: 30 capsule, Refills: 0             ALLERGIES     is allergic to dicyclomine, famotidine, geodon [ziprasidone hcl], haloperidol, iv dye [iodides], reglan [metoclopramide], ibuprofen, aspirin, dicyclomine hcl, hydroxyzine hcl, iodine, metronidazole, mirtazapine, promethazine, and ziprasidone. FAMILY HISTORY     He indicated that his mother is alive. He indicated that his father is alive. He indicated that the status of his sister is unknown. He indicated that the status of his other is unknown. He indicated that the status of his paternal cousin is unknown. SOCIAL HISTORY      reports that he has been smoking cigarettes. He has a 7.50 pack-year smoking history. He has never used smokeless tobacco. He reports previous alcohol use. He reports current drug use. Drugs: Opiates  and Cocaine. PHYSICAL EXAM     INITIAL VITALS: BP (!) 111/51   Pulse 87   Temp 97.9 °F (36.6 °C) (Oral)   Resp 12   Ht 5' 7\" (1.702 m)   Wt 200 lb (90.7 kg)   SpO2 99%   BMI 31.32 kg/m²   Gen: NAD  Head: Normocephalic, atraumatic  Eye: Pupils equal round reactive to light, no conjunctivitis  Heart: Regular rate and rhythm no murmurs  Lungs: Clear to auscultation bilaterally, no respiratory distress  Chest wall: No crepitus, no tenderness palpation  Abdomen: Soft, nontender, nondistended, with no peritoneal signs  Skin: No diaphoresis. No lacerations. Neurologic: Patient is alert and oriented x3, motor and sensation is intact in all 4 extremities, speech is fluent  Extremities: Full range of motion, no cyanosis, no edema, no signs of trauma, no tenderness to palpation    MEDICAL DECISION MAKING:     MDM  32 y.o. male complaints of suicidal thoughts. The patient was just seen a few hours ago and had denied any suicidal or homicidal thoughts. The symptoms have also been somewhat chronic for the patient. . The patient does not appear intoxicated. The patient is showing no signs of any acute active toxidrome. The patient denies any overdose attempt or  medical complaints at this time.   The patient has had multiple recent laboratory evaluations including CBC, CMP. Drug screen a month ago was positive for cocaine and barbiturates. I do not think he needs any laboratory studies testing at this time.  to discuss the patient on the psychiatrist.    DIAGNOSTIC RESULTS     EMERGENCY DEPARTMENT COURSE:   Vitals:    Vitals:    05/28/22 0458 05/28/22 0626 05/28/22 1357 05/28/22 1952   BP: 127/69 (!) 144/69 128/71 (!) 111/51   Pulse: 72 74 88 87   Resp: 16 14 14 12   Temp: 98.1 °F (36.7 °C) 98.4 °F (36.9 °C) 98.2 °F (36.8 °C) 97.9 °F (36.6 °C)   TempSrc: Oral Oral Oral Oral   SpO2: 99%      Weight: 200 lb (90.7 kg) 200 lb (90.7 kg)     Height: 5' 7\" (1.702 m) 5' 7\" (1.702 m)         The patient was given the following medications while in the emergency department:  Orders Placed This Encounter   Medications    acetaminophen (TYLENOL) tablet 650 mg    aluminum & magnesium hydroxide-simethicone (MAALOX) 200-200-20 MG/5ML suspension 30 mL    nicotine polacrilex (NICORETTE) gum 2 mg    polyethylene glycol (GLYCOLAX) packet 17 g    DISCONTD: traZODone (DESYREL) tablet 50 mg    DULoxetine (CYMBALTA) extended release capsule 20 mg    cloNIDine (CATAPRES) tablet 0.1 mg    loperamide (IMODIUM) capsule 2 mg     -------------------------  CRITICAL CARE:   CONSULTS: IP CONSULT TO INTERNAL MEDICINE  IP CONSULT TO SOCIAL WORK  PROCEDURES: Procedures     FINAL IMPRESSION      1. Schizoaffective disorder, bipolar type ([facility]          DISPOSITION/PLAN   DISPOSITION        PATIENT REFERRED TO:  No follow-up provider specified.     DISCHARGE MEDICATIONS:  Current Discharge Medication List            Maged Humphreys MD  Attending Emergency Physician                      Maged Humphreys MD  05/29/22 4526

## 2022-05-28 NOTE — H&P
[facility]  Chava Gracia MD; Johny Hyde MD; Mac Greene MD; MD General Supriya Colbert MD; MD [facility]   [facility]    HISTORY AND PHYSICAL EXAMINATION            Date:   5/28/2022  Patient name:  Martita Marte  Date of admission:  5/28/2022  5:13 AM  MRN:   [id number]  Account:  [de-identified]  YOB: 1990  PCP:    No primary care provider on file. Room:   [address] [place] [address]  Code Status:    Full Code    Chief Complaint:     Chief Complaint   Patient presents with    Suicidal   diarrhea    History Obtained From:     Pt medical record and nursing staff    History of Present Illness:     Martita Marte is a 32 y.o. Non- / non  male who presents with Suicidal   and is admitted to the hospital for the management of Depression with suicidal ideation.     33year-old  gentleman complains of intermittent diarrhea patient has history of GERD and gastritis and gastric ulcer  Denies any weight loss no abdominal pain no nausea vomiting    Past Medical History:     Past Medical History:   Diagnosis Date    Anxiety     Arthritis     Asthma     Bipolar I disorder, most recent episode (or current) depressed, unspecified 9/12/2014    Clostridium difficile infection     COPD (chronic obstructive pulmonary disease) ([address]     Depression     Disease of blood and blood forming organ     Eczema     Fracture, metacarpal     R 4th and 5th    Gastric ulcer     Gastritis 06/13/2018    GERD (gastroesophageal reflux disease)     GI bleed     H. 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[address]     PUD (peptic ulcer disease)     Rheumatoid arthritis ([address]     Rheumatoid arthritis(714.0)     Severe recurrent major depression without psychotic features ([facility] 11/21/2021    Sleep apnea     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     Substance abuse ([facility]     Hx of opiates, benzos, cocaine, alcohol abuse    Suicidal ideation     Suicide attempt by hanging ([facility]     Tobacco dependence     Ulcerative colitis ([facility]     UTI (urinary tract infection)         Past Surgical History:     Past Surgical History:   Procedure Laterality Date    ABDOMEN SURGERY      upper GI scope 7/7/2015    BRONCHOSCOPY      CHOLECYSTECTOMY, LAPAROSCOPIC  07/14/2017    surgery performed at [address] [place] [address], COLON, DIAGNOSTIC      OTHER SURGICAL HISTORY      lumbar puncture    DC COLONOSCOPY W/BIOPSY SINGLE/MULTIPLE  8/9/2017    COLONOSCOPY WITH BIOPSY performed by Valerie Colón MD at [facility] EGD TRANSORAL BIOPSY SINGLE/MULTIPLE N/A 3/20/2017    EGD BIOPSY performed by Galina Escudero MD at [facility]    DC ESOPHAGOGASTRODUODENOSCOPY TRANSORAL DIAGNOSTIC N/A 8/9/2017    EGD ESOPHAGOGASTRODUODENOSCOPY performed by Valerie Colón MD at [facility] N/A 3/20/2017    SIGMOIDOSCOPY DIAGNOSTIC FLEXIBLE performed by Galina Escudero MD at [facility]  2/4/16    UPPER GASTROINTESTINAL ENDOSCOPY N/A 6/13/2018    GASTRITIS    UPPER GASTROINTESTINAL ENDOSCOPY N/A 9/20/2018    EGD BIOPSY performed by Heri Casiano MD at [id number] [place] [address]        Medications Prior to Admission:     Prior to Admission medications    Medication Sig Start Date End Date Taking? Authorizing Provider   diphenhydrAMINE (BENADRYL) 25 MG capsule Take 1 capsule by mouth 2 times daily as needed for Itching 5/26/22   Ramon Francisco,    ALPRAZolam [place]) 0.5 MG tablet Take 0.5 mg by mouth nightly as needed for Sleep.     Historical Provider, MD   polyethylene glycol (MIRALAX) 17 g packet Take 17 g by mouth daily as needed for Constipation  Patient not taking: Reported on 5/24/2022 5/5/22 6/4/22  Alireza Benton MD   Psyllium-Calcium (METAMUCIL PLUS CALCIUM) CAPS Take 1 capsule by mouth daily Take with 8 oz water. Patient not taking: Reported on 5/24/2022 5/5/22   Alireza Benton MD   ondansetron (ZOFRAN ODT) 4 MG disintegrating tablet Take 1 tablet by mouth every 8 hours as needed for Nausea or Vomiting  Patient not taking: Reported on 5/24/2022 5/1/22   Ayad Mendoza,    aluminum-magnesium hydroxide 200-200 MG/5ML suspension Take 10 mLs by mouth every 6 hours as needed for Indigestion 4/22/22   Dru Newton DO   traZODone (DESYREL) 50 MG tablet Take 1 tablet by mouth nightly as needed for Sleep  Patient not taking: Reported on 5/24/2022 4/8/22   Christel Tatum MD   DULoxetine (CYMBALTA) 20 MG extended release capsule Take 1 capsule by mouth daily  Patient not taking: Reported on 5/24/2022 4/8/22   Christel Tatum MD   meloxicam (MOBIC) 7.5 MG tablet Take 1 tablet by mouth 2 times daily as needed for Pain 8/19/21 10/30/21  MASSIEL Davis - CNP        Allergies:     Dicyclomine, Famotidine, Geodon [ziprasidone hcl], Haloperidol, Iv dye [iodides], Reglan [metoclopramide], Ibuprofen, Aspirin, Dicyclomine hcl, Hydroxyzine hcl, Iodine, Metronidazole, Mirtazapine, Promethazine, and Ziprasidone    Social History:     Tobacco:    reports that he has been smoking cigarettes. He has a 7.50 pack-year smoking history. He has never used smokeless tobacco.  Alcohol:      reports previous alcohol use. Drug Use:  reports current drug use. Drugs: Opiates  and Cocaine.     Family History:     Family History   Problem Relation Age of Onset    Diabetes Father     Alcohol Abuse Father     Depression Father     Arthritis Father     High Blood Pressure Father     Other Father         aneurysm & epilepsy    Migraines Father     Arthritis Mother     Other Mother         aneurysm & epilepsy   [facility] Migraines Mother     Diabetes Brother         Aunt and uncles   [address] [place] [address] Depression Brother     Mental Illness Brother     Other Brother         epilepsy    Migraines Brother     Stroke Other         Uncle    Other Brother         murdered Oct 6th, 2014    Colon Cancer Paternal Cousin 43    Other Sister         epilepsy    Migraines Sister        Review of Systems:     Positive and Negative as described in HPI. CONSTITUTIONAL:  negative for fevers, chills, sweats, fatigue, weight loss  HEENT:  negative for vision, hearing changes, runny nose, throat pain  RESPIRATORY:  negative for shortness of breath, cough, congestion, wheezing  CARDIOVASCULAR:  negative for chest pain, palpitations  GASTROINTESTINAL positive for diarrhea  GENITOURINARY:  negative for difficulty of urination, burning with urination, frequency   INTEGUMENT:  negative for rash, skin lesions, easy bruising   HEMATOLOGIC/LYMPHATIC:  negative for swelling/edema   ALLERGIC/IMMUNOLOGIC:  negative for urticaria , itching  ENDOCRINE:  negative increase in drinking, increase in urination, hot or cold intolerance  MUSCULOSKELETAL:  negative joint pains, muscle aches, swelling of joints  NEUROLOGICAL:  negative for headaches, dizziness, lightheadedness, numbness, pain, tingling extremities      Physical Exam:   BP (!) 144/69   Pulse 74   Temp 98.4 °F (36.9 °C) (Oral)   Resp 14   Ht 5' 7\" (1.702 m)   Wt 200 lb (90.7 kg)   SpO2 99%   BMI 31.32 kg/m²   Temp (24hrs), Av.9 °F (36.6 °C), Min:97.2 °F (36.2 °C), Max:98.4 °F (36.9 °C)    No results for input(s): POCGLU in the last 72 hours.   No intake or output data in the 24 hours ending 22 1300    General Appearance: alert, well appearing, and in no acute distress  Mental status: oriented to person, place, and time  Head: normocephalic, atraumatic  Eye: no icterus, redness, pupils equal and reactive, extraocular eye movements intact, conjunctiva clear  Ear: normal external ear, no discharge, hearing intact  Nose: no drainage noted  Mouth: mucous membranes moist  Neck: supple, no carotid bruits, thyroid not palpable  Lungs: Bilateral equal air entry, clear to ausculation, no wheezing, rales or rhonchi, normal effort  Cardiovascular: normal rate, regular rhythm, no murmur, gallop, rub  Abdomen: Soft, nontender, nondistended, normal bowel sounds, no hepatomegaly or splenomegaly  Neurologic: There are no new focal motor or sensory deficits, normal muscle tone and bulk, no abnormal sensation, normal speech, cranial nerves II through XII grossly intact  Skin: No gross lesions, rashes, bruising or bleeding on exposed skin area  Extremities: peripheral pulses palpable, no pedal edema or calf pain with palpation  Psych: normal affect    Investigations:      Laboratory Testing:  No results found for this or any previous visit (from the past 24 hour(s)). Imaging/Diagnostics:  XR CHEST PORTABLE    Result Date: 5/24/2022  Normal examination. Assessment :      Hospital Problems           Last Modified POA    * (Principal) Depression with suicidal ideation 5/28/2022 Yes    Cocaine abuse ([facility] 5/28/2022 Yes    Opioid type dependence, continuous (HCC) (Chronic) 5/28/2022 Yes          Plan:     79-year-old gentleman class I obese BMI 31.32  History of gastritis and GERD complains of diarrhea  Patient is a poor historian  We will check for the gastrin levels  Check for C. difficile and stool molecular panel  Okay to use Imodium for now      Darian Diaz MD  5/28/2022  1:00 PM    Copy sent to Dr. Arina Encarnacion primary care provider on file. Please note that this chart was generated using voice recognition Dragon dictation software. Although every effort was made to ensure the accuracy of this automated transcription, some errors in transcription may have occurred.

## 2022-05-28 NOTE — ED TRIAGE NOTES
Pt here for some anxiety issues, and nausea and abdominal pain  for a couple of days Rates his abdominal pain 5/10  C/o of nausea  Had a BM earlier in the day , loose stools,   appetite been fine  Denies any pain at this time     Denies any homicidal or suicidal thoughts  Pt is calm and cooperative at this time

## 2022-05-28 NOTE — PROGRESS NOTES
Behavioral Services  Medicare Certification Upon Admission    I certify that this patient's inpatient psychiatric hospital admission is medically necessary for:    [x] (1) Treatment which could reasonably be expected to improve this patient's condition,       [x] (2) Or for diagnostic study;     AND     [x](2) The inpatient psychiatric services are provided while the individual is under the care of a physician and are included in the individualized plan of care.     Estimated length of stay/service 3 to 5 days    Plan for post-hospital care outpatient [address]    Electronically signed by Katelyn Dominguez MD on 5/28/2022 at 12:33 PM
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
## 2022-05-28 NOTE — ED PROVIDER NOTES
[facility]     Emergency Department     Faculty Attestation    I performed a history and physical examination of the patient and discussed management with the resident. I have reviewed and agree with the residents findings including all diagnostic interpretations, and treatment plans as written. Any areas of disagreement are noted on the chart. I was personally present for the key portions of any procedures. I have documented in the chart those procedures where I was not present during the key portions. I have reviewed the emergency nurses triage note. I agree with the chief complaint, past medical history, past surgical history, allergies, medications, social and family history as documented unless otherwise noted below. Documentation of the HPI, Physical Exam and Medical Decision Making performed by [email or web address] is based on my personal performance of the HPI, PE and MDM. For Physician Assistant/ Nurse Practitioner cases/documentation I have personally evaluated this patient and have completed at least one if not all key elements of the E/M (history, physical exam, and MDM). Additional findings are as noted. 33 yo M c/o nausea & abdominal pain, pt concerned about his anxiety, denies hallucination, no suicidal or homicidal ideation, no fever,   No injury,   pe vss flat affect, steady even gait, cong, eomi , neck supple with full rom, abdomen mil llq tenderness, no rebound, no guarding, no rigidity, no distension,     We started abdominal pain work up / med, pt refused to cooperative, pt appears to have decision making capacity, pt eloped,     EKG Interpretation    Interpreted by me      CRITICAL CARE: There was a high probability of clinically significant/life threatening deterioration in this patient's condition which required my urgent intervention. Total critical care time was 5 minutes. This excludes any time for separately reportable procedures. [facility], DO  05/28/22 1320 [facility], DO  05/28/22 [id number]

## 2022-05-28 NOTE — BH NOTE
[facility]  Admission Note     Admission Type:   Admission Type: Voluntary    Reason for admission:  Reason for Admission: depression with suicidal thoughts with plan  Patient states he has suicidal thoughts to stab himself in the heart. History of overdose attempts. Patient is homeless. Patient admits to using alcohol and fentanyl. Patient was cooperative with admission process. Patient was wanded for safety and changed into hospital attire. Patient signed all admission paperwork.   PATIENT STRENGTHS:   Willingness to seek treatment    Patient Strengths and Limitations:   inappropriate leisure activites    Addictive Behavior:   Addictive Behavior  In the Past 3 Months, Have You Felt or Has Someone Told You That You Have a Problem With  : None    Medical Problems:   Past Medical History:   Diagnosis Date    Anxiety     Arthritis     Asthma     Bipolar I disorder, most recent episode (or current) depressed, unspecified 9/12/2014    Clostridium difficile infection     COPD (chronic obstructive pulmonary disease) ([address]     Depression     Disease of blood and blood forming organ     Eczema     Fracture, metacarpal     R 4th and 5th    Gastric ulcer     Gastritis 06/13/2018    GERD (gastroesophageal reflux disease)     GI bleed     H. 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[address]     Substance abuse ([address]     Hx of opiates, benzos, cocaine, alcohol abuse    Suicidal ideation     Suicide attempt by hanging ([address]     Tobacco dependence     Ulcerative colitis ([address]     UTI (urinary tract infection)        Status EXAM:  Mental Status and Behavioral Exam  Normal: No  Level of Assistance: Independent/Self  Facial Expression: Flat  Affect: Blunt  Level of Consciousness: Alert  Frequency of Checks: 4 times per hour, close  Mood:Normal: No  Mood: Depressed  Motor Activity:Normal: Yes  Eye Contact: Good  Observed Behavior: Cooperative  Sexual Misconduct History: Current - no  Preception: Others (comment) (oriented x4)  Attention:Normal: Yes  Thought Processes: Other (comment) (logical)  Thought Content:Normal: Yes  Depression Symptoms: Increased irritability,Loss of interest  Anxiety Symptoms: Generalized  Elizabeth Symptoms: No problems reported or observed.   Hallucinations: None  Delusions: No  Memory:Normal: Yes  Insight and Judgment: No  Insight and Judgment: Poor judgment,Poor insight,Unmotivated    Tobacco Screening:  Practical Counseling, on admission, reuben X, if applicable and completed (first 3 are required if patient doesn't refuse):            ( )  Recognizing danger situations (included triggers and roadblocks)                    ( )  Coping skills (new ways to manage stress, exercise, relaxation techniques, changing routine, distraction)                                                           ( )  Basic information about quitting (benefits of quitting, techniques in how to quit, available resources  ( ) Referral for counseling faxed to [facility]                                           (x ) Patient refused counseling  ( ) Patient has not smoked in the last 30 days    Metabolic Screening:    Lab Results   Component Value Date    LABA1C 5.7 10/23/2014       Lab Results   Component Value Date    CHOL 205 (H) 02/21/2017    CHOL 158 11/09/2015    CHOL 152 10/23/2014    CHOL 206 (H) 03/05/2014    CHOL 205 (H) 01/22/2014    CHOL 208 (H) 08/28/2013     Lab Results   Component Value Date    TRIG 189 (H) 02/21/2017    TRIG 223 (H) 11/09/2015    TRIG 52 10/23/2014    TRIG 104 03/05/2014    TRIG 74 01/22/2014    TRIG 104 08/28/2013 Lab Results   Component Value Date    HDL 44 02/21/2017    HDL 40 (L) 11/09/2015    HDL 55 10/23/2014    HDL 54 03/05/2014    HDL 53 01/22/2014    HDL 72 (H) 08/28/2013     No components found for: LDLCAL  No results found for: LABVLDL      Body mass index is 31.32 kg/m². BP Readings from Last 2 Encounters:   05/28/22 (!) 144/69   05/28/22 117/70           Pt admitted with followings belongings:  Dental Appliances: None  Vision - Corrective Lenses: None  Hearing Aid: None  Jewelry: Watch  Body Piercings Removed: N/A  Clothing: Footwear,Pants,Shirt  Other Valuables: Money ($0.74)     Patient oriented to surroundings and program expectations and copy of patient rights given. Received admission packet: yes. Consents reviewed, signed yes. Patient verbalize understanding: yes.   Patient education on precautions: yes                   Jevon Camacho RN

## 2022-05-28 NOTE — PLAN OF CARE
Problem: Chronic Conditions and Co-morbidities  Goal: Patient's chronic conditions and co-morbidity symptoms are monitored and maintained or improved  5/28/2022 1810 by Jarett Rodriguez  Outcome: Progressing  Pt. Cooperative with vital signs. Problem: Self Harm/Suicidality  Goal: Will have no self-injury during hospital stay  Description: INTERVENTIONS:  1. Q 30 MINUTES: Routine safety checks  2. Q SHIFT & PRN: Assess risk to determine if routine checks are adequate to maintain patient safety  5/28/2022 1810 by Jarett Rodriguez  Outcome: Progressing  Pt denies current suicidal thoughts. Pt has isolated in bed all day. Pt. Remains safe on the unit. Q 15 minute checks for safety maintained. Problem: Depression  Goal: Will be euthymic at discharge  Description: INTERVENTIONS:  1. Administer medication as ordered  2. Provide emotional support via 1:1 interaction with staff  3. Encourage involvement in milieu/groups/activities  4. Monitor for social isolation  5/28/2022 1810 by Jarett Rodriguez  Outcome: Progressing  Pt has been medication compliant. Isolates in bed except for meals. Denies hallucinations. Pt. Remains safe on the unit. Q 15 minute checks for safety maintained.

## 2022-05-28 NOTE — GROUP NOTE
Date: 5/28/2022    Group Start Time: 1100  Group End Time: 1120  Group Topic: Group Therapy    [facility]        Group Therapy Note    Attendees:  17/18           patient refused to attend safety planning group at 1100 after encouragement from staff.  1:1 talk time offered but refused      Signature:  Marisa Lozada

## 2022-05-28 NOTE — ED NOTES
Rn attempted to started IV access for labs, that were ordered  Pt refused to have his blood drawn, States\" I do not have any belly pain, or nausea I just need medication to clear my head\"    Rn explain to pt the reason for labs, and pt still refused  Dr. Jaja Combs and Dr Eusebio Hope updated  Pt will be escorted by Alexa England for discharge       Jimmy Cottrell RN  05/28/22 9029

## 2022-05-28 NOTE — CARE COORDINATION
BHI Biopsychosocial Assessment    Current Level of Psychosocial Functioning     Independent X  Dependent    Minimal Assist     Comments:    Psychosocial High Risk Factors (check all that apply)    Unable to obtain meds   Chronic illness/pain    Substance abuse X  Lack of Family Support   Financial stress   Isolation   Inadequate Community Resources  Suicide attempt(s)  Not taking medications  X  Victim of crime   Developmental Delay  Unable to manage personal needs    Age 72 or older   Homeless X  No transportation   Readmission within 30 days  Unemployment  Traumatic Event    Comments:   Psychiatric Advanced Directives: n/a    Family to [employer] in Treatment: declined    Sexual Orientation:  n/a    Patient Strengths: patient has insurance    Patient Barriers: patient is homeless, non-compliant with treatment and medications, substance use      Opiate Education Provided:  Not at this time      CMHC/mental health history: [facility]    Plan of Care   medication management, group/individual therapies, family meetings, psycho -education, treatment team meetings to assist with stabilization    Initial Discharge Plan:  TBD- patient does not participate in assessment      Clinical Summary:    Kandy Lynn is a 33 y/o male admitted to the [facility] for suicidal ideations and depressed mood. Patient did not participate in assessment today when social work attempted to interview him in his room. A review of his chart shows several ED encounters in April and May with one admission to the [facility] in early April. Patient discharged to [facility] with a plan to walk in to complete MAT with [facility] to continue suboxone. Social work will continue to engage patient in treatment/discharge planning as symptoms improve.

## 2022-05-28 NOTE — BH NOTE
Patient given tobacco quitline number 97060881240 at this time, refusing to call at this time, states \" I just dont want to quit now\"- patient given information as to the dangers of long term tobacco use. Continue to reinforce the importance of tobacco cessation.

## 2022-05-28 NOTE — GROUP NOTE
Group Therapy Note    Date: 5/28/2022    Group Start Time: 1000  Group End Time: 7561  Group Topic: Psychoeducation    TIESHA Flores, MSW, LSW        Group Therapy Note    Attendees: 4/18       Patient was offered group therapy today but declined to participate despite encouragement from staff. 1:1 was offered.       Signature:  Noelle Stark, KRISH, LSW

## 2022-05-28 NOTE — PLAN OF CARE
[facility]  Initial Interdisciplinary Treatment Plan NOTE      Original treatment plan Date & Time: 5/28/2022                    852am    Admission Type:  Admission Type: Voluntary    Reason for admission:   Reason for Admission: depression with suicidal thoughts with plan    Estimated Length of Stay:  5-7days  Estimated Discharge Date: to be determined by physician    PATIENT STRENGTHS:  Patient Strengths:   Patient Strengths and Limitations:   Addictive Behavior: Addictive Behavior  In the Past 3 Months, Have You Felt or Has Someone Told You That You Have a Problem With  : None  Medical Problems:  Past Medical History:   Diagnosis Date    Anxiety     Arthritis     Asthma     Bipolar I disorder, most recent episode (or current) depressed, unspecified 9/12/2014    Clostridium difficile infection     COPD (chronic obstructive pulmonary disease) ([facility]     Depression     Disease of blood and blood forming organ     Eczema     Fracture, metacarpal     R 4th and 5th    Gastric ulcer     Gastritis 06/13/2018    GERD (gastroesophageal reflux disease)     GI bleed     H. 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[address]     Substance abuse ([address]     Hx of opiates, benzos, cocaine, alcohol abuse    Suicidal ideation     Suicide attempt by hanging ([address]     Tobacco dependence     Ulcerative colitis ([address]     UTI (urinary tract infection)      Status EXAM:Mental Status and Behavioral Exam  Normal: No  Level of Assistance: Independent/Self  Facial Expression: Flat  Affect: Blunt  Level of Consciousness: Alert  Frequency of Checks: 4 times per hour, close  Mood:Normal: No  Mood: Depressed  Motor Activity:Normal: Yes  Eye Contact: Good  Observed Behavior: Cooperative  Sexual Misconduct History: Current - no  Preception: Others (comment) (oriented x4)  Attention:Normal: Yes  Thought Processes: Other (comment) (logical)  Thought Content:Normal: Yes  Depression Symptoms: Increased irritability,Loss of interest  Anxiety Symptoms: Generalized  Elizabeth Symptoms: No problems reported or observed.   Hallucinations: None  Delusions: No  Memory:Normal: Yes  Insight and Judgment: No  Insight and Judgment: Poor judgment,Poor insight,Unmotivated    EDUCATION:   Learner Progress Toward Treatment Goals: reviewed group plans and strategies for care    Method:group therapy, medication compliance, individualized assessments and care planning    Outcome: needs reinforcement    PATIENT GOALS: to be discussed with patient within 72 hours    PLAN/TREATMENT RECOMMENDATIONS:     continue group therapy , medications compliance, goal setting, individualized assessments and care, continue to monitor pt on unit      SHORT-TERM GOALS:   Time frame for Short-Term Goals: 5-7 days    LONG-TERM GOALS:  Time frame for Long-Term Goals: 6 months  Members Present in Team Meeting: See Signature Sheet    Malinda Valdivia Griseofulvin Pregnancy And Lactation Text: This medication is Pregnancy Category X and is known to cause serious birth defects. It is unknown if this medication is excreted in breast milk but breast feeding should be avoided.

## 2022-05-28 NOTE — GROUP NOTE
Group Therapy Note    Date: 5/28/2022    Group Start Time: 1350  Group End Time: 1059  Group Topic: Group Therapy    [facility]        Group Therapy Note    Attendees: 9/14         patient refused to attend wellness group at [phone] after encouragement from staff.  1:1 talk time offered but refused      Signature:  Nell Pacheco

## 2022-05-28 NOTE — H&P
Department of Psychiatry  Attending Physician Psychiatric Assessment     Reason for Admission to Psychiatric Unit:  Threat to self requiring 24 hour professional observation  Failure of outpatient psychiatry treatment so that the beneficiary requires 24 hour professional observation and care  Concerns about patient's safety in the community    CHIEF COMPLAINT: Suicidal ideation    History obtained from: Patient, electronic medical record          HISTORY OF PRESENT ILLNESS:    Katie Givens is a 32 y.o. male who has a past medical history of depression, suicidal ideation, opiate dependence, sleep apnea, asthma, IBS. Patient presented to the ED \"via a cab. Pt was seen at [facility] 2 hrs prior to arrival ad denied SI at that time. Pt is now stating pt is suicidal with a plan to stab self in the chest. Pt has had these thoughts for the past 4 days. Pt identifies pt's homelessness and being off pt's medications as the triggers to pt's SI. Pt denies HI/AH/VH. Pt has a previous diagnosis of schizoaffective disorder. Pt has been off pt's medication for the past \"few weeks. \" Pt did not follow up with [facility] after pt was discharged from the [facility] on 4/8/22. Pt initially denied substance abuse to this writer but then later admits to using cocaine earlier today and fentanyl 2 weeks ago. Pt reports poor sleep and a poor appetite. \"    Patient has had multiple previous inpatient psychiatric hospitalizations. Most recently [facility] on 4/5/2022 to 4/8/2022. At that time patient was discharged on Cymbalta. Patient reports that he was supposed to follow up with [facility] outpatient however it did not because he was \"weak\" and continued to use drugs. Patient denies medication compliance since last hospitalization. Patient reports that Cymbalta caused his \"skin to break out\" states he does not want this medication. Patient reports he is open to other medication trial and will maintain compliance during hospitalization after discharge. patient endorses cocaine and fentanyl. UDS positive for cocaine and oxycodone upon admission. History of head trauma: [x] Yes [] No    History of seizures: [] Yes [x] No    History of violence or aggression: [x] Yes [] No         PSYCHIATRIC HISTORY:  [] Yes [x] No    Currently follows with: Refused to follow-up with [facility] after previous discharge from [facility]  Reports multiple previous lifetime suicide attempts by overdose. Multiple previous psychiatric hospital admissions. Most recent to [facility] on 4/5/2022    Home Medication Compliance: [] Yes [x] No    Past psychiatric medications includes: Clonidine, Remeron, Geodon, Cymbalta, Abilify, Aristada, Neurontin, trazodone, Seroquel, Suboxone, methadone, Wellbutrin, Zyprexa, Haldol    Adverse reactions from psychotropic medications: [x] Yes [] No  Reports \"skin breakout\" after taking Cymbalta. Endorses reaction to Haldol including throat swelling and thick tongue. Anaphylaxis with Geodon. Remeron caused increased agitation.          Lifetime Psychiatric Review of Systems         Depression: Endorses     Anxiety: Endorses history of     Panic Attacks: Denies     Elizabeth or Hypomania: Denies while sober     Phobias: Denies     Obsessions and Compulsions: Denies     Body or Vocal Tics: Denies     Visual Hallucinations: Denies     Auditory Hallucinations: Endorses     Delusions/Paranoia: Denies     PTSD: Denies    Past Medical History:        Diagnosis Date    Anxiety     Arthritis     Asthma     Bipolar I disorder, most recent episode (or current) depressed, unspecified 9/12/2014    Clostridium difficile infection     COPD [facility] disease) [facility]     Depression     Disease of blood and blood forming organ     Eczema     Fracture, metacarpal     R 4th and 5th    Gastric ulcer     Gastritis 06/13/2018    GERD (gastroesophageal reflux disease)     GI bleed     H. 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[facility]     Substance abuse ([facility]     Hx of opiates, benzos, cocaine, alcohol abuse    Suicidal ideation     Suicide attempt by hanging ([facility]     Tobacco dependence     Ulcerative colitis ([facility]     UTI (urinary tract infection)        Past Surgical History:        Procedure Laterality Date    ABDOMEN SURGERY      upper GI scope 7/7/2015    BRONCHOSCOPY      CHOLECYSTECTOMY, LAPAROSCOPIC  07/14/2017    surgery performed at [address] [place] [address], COLON, DIAGNOSTIC      OTHER SURGICAL HISTORY      lumbar puncture    AK COLONOSCOPY W/BIOPSY SINGLE/MULTIPLE  8/9/2017    COLONOSCOPY WITH BIOPSY performed by Bia Johnson MD at [facility] EGD TRANSORAL BIOPSY SINGLE/MULTIPLE N/A 3/20/2017    EGD BIOPSY performed by Rayo Worthy MD at [facility]    AK ESOPHAGOGASTRODUODENOSCOPY TRANSORAL DIAGNOSTIC N/A 8/9/2017    EGD ESOPHAGOGASTRODUODENOSCOPY performed by Bia Johnson MD at [facility] N/A 3/20/2017    SIGMOIDOSCOPY DIAGNOSTIC FLEXIBLE performed by Rayo Worthy MD at [address] [place] [address]  2/4/16    UPPER GASTROINTESTINAL ENDOSCOPY N/A 6/13/2018    GASTRITIS    UPPER GASTROINTESTINAL ENDOSCOPY N/A 9/20/2018    EGD BIOPSY performed by Erika Roland MD at [facility] OR       Allergies:  Dicyclomine, Famotidine, Geodon [ziprasidone hcl], Haloperidol, Iv dye [iodides], Reglan [metoclopramide], Ibuprofen, Aspirin, Dicyclomine hcl, Hydroxyzine hcl, Iodine, Metronidazole, Mirtazapine, Promethazine, and Ziprasidone         Social History:     Born in: [place] [address] [place] [address]   Family: Reports that he was raised by both his mother and his father. He reports that he has 4 brothers and 1 sister whom he is close with. Highest Level of Education: Received his GED  Occupation: Currently unemployed receives SSDI  Marital Status: Never   Children: Patient reports that he has 2 daughters who he is not able to see  Residence:  Homeless, sleeping on the street or on people's couches  Stressors: Untreated mental illness and polysubstance abuse  Patient Assets/Supportive Factors: Patient is seeking additional support         DRUG USE HISTORY  Social History     Tobacco Use   Smoking Status Current Every Day Smoker    Packs/day: 0.50    Years: 15.00    Pack years: 7.50    Types: Cigarettes   Smokeless Tobacco Never Used     Social History     Substance and Sexual Activity   Alcohol Use Not Currently    Comment: drinks daily     Social History     Substance and Sexual Activity   Drug Use Yes    Types: Opiates , Cocaine    Comment: Hx of opiates, benzos, cocaine, alcohol abuse       When discussing alcohol consumption patient denies any recent use endorsing history of. When discussing illicit drug use patient endorses cocaine and fentanyl. UDS positive for cocaine and oxycodone upon admission.           LEGAL HISTORY:   HISTORY OF INCARCERATION: [x] Yes [] No    Family History:       Problem Relation Age of Onset    Diabetes Father     Alcohol Abuse Father     Depression Father     Arthritis Father     High Blood Pressure Father     Other Father         aneurysm & epilepsy    Migraines Father     Arthritis Mother     Other Mother         aneurysm & epilepsy    Migraines Mother     Diabetes Brother         Aunt and uncles    Depression Brother     Mental Illness Brother     Other Brother         epilepsy    Migraines Brother     Stroke Other         Uncle    Other Brother         murdered Oct 6th, 2014    Colon Cancer Paternal Cousin 43    Other Sister epilepsy    Migraines Sister        Psychiatric Family History  Patient endorses psychiatric family history. Suicides in family: [] Yes [x] No    Substance use in family: [] Yes [x] No         PHYSICAL EXAM:  Vitals:  BP (!) 144/69   Pulse 74   Temp 98.4 °F (36.9 °C) (Oral)   Resp 14   Ht 5' 7\" (1.702 m)   Wt 200 lb (90.7 kg)   SpO2 99%   BMI 31.32 kg/m²   Pain Level: Denies    LABS:  Labs reviewed: [x] Yes  Potassium 3.6, glucose 63, anion gap 7, bilirubin less than 0.1, seg neutrophils 70    Last EKG in EMR reviewed: [x] Yes  QTc 408 on 5/24/2022          Review of Systems   Constitutional: Negative for chills and weight loss. HENT: Negative for ear pain and nosebleeds. Eyes: Negative for blurred vision and photophobia. Respiratory: Negative for cough, shortness of breath and wheezing. Cardiovascular: Negative for chest pain and palpitations. Gastrointestinal: Negative for abdominal pain, diarrhea and vomiting. Genitourinary: Negative for dysuria and urgency. Musculoskeletal: Negative for falls and joint pain. Skin: Negative for itching and rash. Neurological: Negative for tremors, seizures and weakness. Endo/Heme/Allergies: Does not bruise/bleed easily. Physical Exam:   Constitutional:  Appears well-developed and well-nourished, no acute distress. HENT:   Head: Normocephalic and atraumatic. Eyes: Conjunctivae are normal. Right eye exhibits no discharge. Left eye exhibits no discharge. No scleral icterus. Neck: Normal range of motion. Neck supple. Pulmonary/Chest:  No respiratory distress or accessory muscle use, no wheezing. Cardiac: Regular rate and rhythm. Abdominal: Soft. Non-tender. Exhibits no distension. Musculoskeletal: Normal range of motion. Exhibits no edema. Neurological: cranial nerves II-XII grossly in tact, normal gait and station. Skin: Skin is warm and dry. Patient is not diaphoretic. No erythema.       Mental Status Examination:    Level of consciousness:  Lethargic  Appearance:  Appropriate attire, resting in bed, fair grooming   Behavior/Motor: Approachable, psychomotor slowing  Attitude toward examiner:  Cooperative, fair attention, fair eye contact  Speech: Slow rate, low volume, and slurred  Mood: \"Depressed\"  Affect:  Mood congruent, flat, depressed  Thought processes: Linear, coherent and slow. Thought content: Active suicidal ideations, without current intent to harm self on unit. Unable to contract for safety off unit.               Denies homicidal ideations               Endorses auditory hallucinations, denies visual hallucinations              Denies delusions              Denies paranoia  Cognition:  Oriented to self, location, time, situation  Concentration: Clinically adequate  Memory: Intact  Insight &Judgment: Poor           DSM-5 Diagnosis    Principal Problem: Depression with suicidal ideation    Opiate abuse  And cocaine abuse    Psychosocial and Contextual factors:  Financial X  Occupational X  Relationship X  Legal   Living situation X  Educational     Past Medical History:   Diagnosis Date    Anxiety     Arthritis     Asthma     Bipolar I disorder, most recent episode (or current) depressed, unspecified 9/12/2014    Clostridium difficile infection     COPD (chronic obstructive pulmonary disease) ([address]     Depression     Disease of blood and blood forming organ     Eczema     Fracture, metacarpal     R 4th and 5th    Gastric ulcer     Gastritis 06/13/2018    GERD (gastroesophageal reflux disease)     GI bleed     H. 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 Home medications reviewed. Medications as determined by attending physician  Monitor need and frequency of PRN medications. Attempt to develop insight. Follow-up daily while inpatient. Reviewed risks and benefits as well as potential side effects with patient. CONSULT:  [x] Yes [] No  Internal medicine for medical management/medical H&P      Risk Management: close watch per standard protocol      Psychotherapy: participation in milieu and group and individual sessions with Attending Physician,  and Physician Assistant/CNP      Estimated length of stay:  2-14 days      GENERAL PATIENT/FAMILY EDUCATION  Patient will understand basic signs and symptoms, patient will understand benefits/risks and potential side effects from proposed medications, and patient will understand their role in recovery. Family is not active in patient's care. Patient assets that may be helpful during treatment include: Intent to participate and engage in treatment, sufficient fund of knowledge and intellect to understand and utilize treatments. Goals:    1) Remission of suicidal ideation. 2) Stabilization of symptoms prior to discharge. 3) Establish efficacy and tolerability of medications. Behavioral Services  Medicare Certification     Admission Day 1  I certify that this patient's inpatient psychiatric hospital admission is medically necessary for:    x (1) treatment which could reasonably be expected to improve this patient's condition, or    x (2) diagnostic study or its equivalent.      Time Spent: 60 minutes    Gil Finley is a 32 y.o. male being evaluated face to face    --[facility], APRN - CNP on 5/28/2022 at 12:08 PM    An electronic signature was used to authenticate this note. Psychiatry Attending Attestation     I independently saw and evaluated the patient. I reviewed the Advance Practice Provider's documentation above. Any additional comments or changes to the Advance Practice Provider's documentation are stated below otherwise agree with assessment. Patient is a 19-year-old male with history of polysubstance abuse-cannabis methamphetamine fentanyl admitted for worsening suicidal thoughts with a plan to kill self by stabbing himself. Reports that he has been feeling increasingly depressed for last several weeks now. [place]. Reports having trouble falling asleep and staying asleep. Reports poor energy and motivation. Reports currently dealing with withdrawal symptoms as muscle cramps diaphoresis and restlessness. Notes that he has been off of his psychotropic medications for last several weeks now. PLAN  We will restart Cymbalta and titrate to effect  We will have supportive medications available for opioid withdrawal  Attempt to develop insight  Psycho-education conducted. Supportive Therapy conducted.   Probable discharge is TBD  Follow-up TBD    Electronically signed by Salvador Guerrero MD on 5/28/22 at 12:32 PM EDT

## 2022-05-28 NOTE — ED NOTES
Mode of arrival (squad #, walk in, police, etc) : Arrived to ER by cab        Chief complaint(s): Suicidal        Arrival Note (brief scenario, treatment PTA, etc). : Complaining of feeling suicidal x 1 day. States he has a plan to stab himself in his heart. States previous suicide attempt with overdose 2 weeks ago. . States he is not linked to any mental health center. Hx of alcohol abuse and denies any alcohol at present. Hx of drug abuse and cannot remember the last time he snorted Fentanyl Denies homicidal ideation. States he is homeless. Hx Anxiety, Depression and PTSD. Patient has been off his psych meds x 3 weeks. Denies hearing voices. A/O X 3. States [facility]. C= \"Have you ever felt that you should Cut down on your drinking? \"  No  A= \"Have people Annoyed you by criticizing your drinking? \"  No  G= \"Have you ever felt bad or Guilty about your drinking? \"  No  E= \"Have you ever had a drink as an Eye-opener first thing in the morning to steady your nerves or to help a hangover? \"  No      Deferred []      Reason for deferring: N/A    *If yes to two or more: probable alcohol abuse. Marielena May RN  05/28/22 [id number] Grabiel Oliver RN  05/28/22 1506

## 2022-05-29 PROCEDURE — 6370000000 HC RX 637 (ALT 250 FOR IP): Performed by: PSYCHIATRY & NEUROLOGY

## 2022-05-29 PROCEDURE — 6360000002 HC RX W HCPCS: Performed by: NURSE PRACTITIONER

## 2022-05-29 PROCEDURE — 99232 SBSQ HOSP IP/OBS MODERATE 35: CPT | Performed by: INTERNAL MEDICINE

## 2022-05-29 PROCEDURE — 99232 SBSQ HOSP IP/OBS MODERATE 35: CPT | Performed by: NURSE PRACTITIONER

## 2022-05-29 PROCEDURE — 1240000000 HC EMOTIONAL WELLNESS R&B

## 2022-05-29 RX ORDER — DIPHENHYDRAMINE HYDROCHLORIDE 50 MG/ML
25 INJECTION INTRAMUSCULAR; INTRAVENOUS ONCE
Status: COMPLETED | OUTPATIENT
Start: 2022-05-29 | End: 2022-05-29

## 2022-05-29 RX ORDER — BUPRENORPHINE AND NALOXONE 2; .5 MG/1; MG/1
1 FILM, SOLUBLE BUCCAL; SUBLINGUAL 2 TIMES DAILY
Status: DISCONTINUED | OUTPATIENT
Start: 2022-05-29 | End: 2022-05-31 | Stop reason: HOSPADM

## 2022-05-29 RX ADMIN — NICOTINE POLACRILEX 2 MG: 2 GUM, CHEWING BUCCAL at 17:22

## 2022-05-29 RX ADMIN — BUPRENORPHINE AND NALOXONE 1 FILM: 2; .5 FILM BUCCAL; SUBLINGUAL at 15:01

## 2022-05-29 RX ADMIN — NICOTINE POLACRILEX 2 MG: 2 GUM, CHEWING BUCCAL at 15:01

## 2022-05-29 RX ADMIN — CLONIDINE HYDROCHLORIDE 0.1 MG: 0.1 TABLET ORAL at 20:23

## 2022-05-29 RX ADMIN — NICOTINE POLACRILEX 2 MG: 2 GUM, CHEWING BUCCAL at 16:47

## 2022-05-29 RX ADMIN — DIPHENHYDRAMINE HYDROCHLORIDE 25 MG: 50 INJECTION, SOLUTION INTRAMUSCULAR; INTRAVENOUS at 17:18

## 2022-05-29 RX ADMIN — NICOTINE POLACRILEX 2 MG: 2 GUM, CHEWING BUCCAL at 19:27

## 2022-05-29 RX ADMIN — BUPRENORPHINE AND NALOXONE 1 FILM: 2; .5 FILM BUCCAL; SUBLINGUAL at 20:23

## 2022-05-29 RX ADMIN — NICOTINE POLACRILEX 2 MG: 2 GUM, CHEWING BUCCAL at 13:41

## 2022-05-29 RX ADMIN — NICOTINE POLACRILEX 2 MG: 2 GUM, CHEWING BUCCAL at 11:47

## 2022-05-29 RX ADMIN — NICOTINE POLACRILEX 2 MG: 2 GUM, CHEWING BUCCAL at 10:15

## 2022-05-29 NOTE — PLAN OF CARE
[facility]  Day 3 Interdisciplinary Treatment Plan NOTE    Review Date & Time: 5/29/2022                        1000am    Admission Type:   Admission Type: Voluntary    Reason for admission:  Reason for Admission: depression with suicidal thoughts with plan  Estimated Length of Stay: 5-7 days  Estimated Discharge Date Update: to be determined by physician    PATIENT STRENGTHS:  Patient Strengths    Patient Strengths and Limitations:Limitations: Multiple barriers to leisure interests,Tendency to isolate self,Inappropriate/potentially harmful leisure interests,Difficult relationships / poor social skills,Difficulty problem solving/relies on others to help solve problems,Apathetic / unmotivated,Perceives need for assistance with self-care  Addictive Behavior:Addictive Behavior  In the Past 3 Months, Have You Felt or Has Someone Told You That You Have a Problem With  : None  Medical Problems:  Past Medical History:   Diagnosis Date    Anxiety     Arthritis     Asthma     Bipolar I disorder, most recent episode (or current) depressed, unspecified 9/12/2014    Clostridium difficile infection     COPD (chronic obstructive pulmonary disease) ([address]     Depression     Disease of blood and blood forming organ     Eczema     Fracture, metacarpal     R 4th and 5th    Gastric ulcer     Gastritis 06/13/2018    GERD (gastroesophageal reflux disease)     GI bleed     H. 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[address]     Substance abuse ([address]     Hx of opiates, benzos, cocaine, alcohol abuse    Suicidal ideation     Suicide attempt by hanging ([address]     Tobacco dependence Ulcerative colitis ([facility] [facility]     UTI (urinary tract infection)        Risk:  Fall Risk   Ross Scale Ross Scale Score: 22  BVC    Change in scores no Changes to plan of Care no    Status EXAM:   Mental Status and Behavioral Exam  Normal: No  Level of Assistance: Independent/Self  Facial Expression: Flat,Avoids Gaze  Affect: Blunt  Level of Consciousness: Alert  Frequency of Checks: 4 times per hour, close  Mood:Normal: No  Mood: Depressed  Motor Activity:Normal: Yes  Motor Activity: Decreased  Eye Contact: Poor  Observed Behavior: Guarded,Cooperative  Sexual Misconduct History: Past - no  Preception: [place] to person,[place] to time,[place] to place,[place] to situation  Attention:Normal: Yes  Thought Processes: Circumstantial  Thought Content:Normal: Yes  Depression Symptoms: Feelings of hopelessess,Feelings of helplessness,Isolative  Anxiety Symptoms: Generalized  Elizabeth Symptoms: No problems reported or observed. Hallucinations: None  Delusions: No  Memory:Normal: Yes  Insight and Judgment: Yes  Insight and Judgment: Poor judgment,Poor insight,Unmotivated    Daily Assessment Last Entry:   Daily Sleep (WDL): Within Defined Limits            Daily Nutrition (WDL): Exceptions to WDL (declined breakfast)  Appetite Change: Decreased  Level of Assistance: Independent/Self    Patient Monitoring:  Frequency of Checks: 4 times per hour, close    Psychiatric Symptoms:   Depression Symptoms  Depression Symptoms: Feelings of hopelessess,Feelings of helplessness,Isolative  Anxiety Symptoms  Anxiety Symptoms: Generalized  Elizabeth Symptoms  Elizabeth Symptoms: No problems reported or observed.           Suicide Risk CSSR-S:  1) Within the past month, have you wished you were dead or wished you could go to sleep and not wake up? : Yes  2) Have you actually had any thoughts of killing yourself? : Yes  3) Have you been thinking about how you might kill yourself? : Yes  5) Have you started to work out or worked out the details of how to kill yourself?  Do you intend to carry out this plan? : No  6) Have you ever done anything, started to do anything, or prepared to do anything to end your life?: No  Change in Result                       no                   Change in Plan of care                no      EDUCATION:   EDUCATION:   Learner Progress Toward Treatment Goals: Reviewed results and recommendations of this team, Reviewed group plan and strategies, Reviewed signs, symptoms and risk of self harm and violent behavior, Reviewed goals and plan of care    Method:small group, individual verbal education    Outcome:verbalized by patient, but needs reinforcement to obtain goals    PATIENT GOALS:  Short term: Pt refused to meet with team, pt did not develop a short term goal  Long term:Pt did not develop a long term goal    PLAN/TREATMENT RECOMMENDATIONS UPDATE: continue with group therapies, increased socialization, continue planning for after discharge goals, continue with medication compliance    SHORT-TERM GOALS UPDATE:   Time frame for Short-Term Goals: 5-7 days    LONG-TERM GOALS UPDATE:   Time frame for Long-Term Goals: 6 months  Members Present in Team Meeting: See Signature Sheet    Lesley Kerr

## 2022-05-29 NOTE — PLAN OF CARE
Problem: Chronic Conditions and Co-morbidities  Goal: Patient's chronic conditions and co-morbidity symptoms are monitored and maintained or improved  Outcome: Progressing  Pt. Is cooperative with vital signs. Problem: Self Harm/Suicidality  Goal: Will have no self-injury during hospital stay  Description: INTERVENTIONS:  1. Q 30 MINUTES: Routine safety checks  2. Q SHIFT & PRN: Assess risk to determine if routine checks are adequate to maintain patient safety  5/29/2022 0942 by Jose Barber  Outcome: Progressing  Pt denies current suicidal thoughts. Pt. Remains safe on the unit. Q 15 minute checks for safety maintained. Problem: Depression  Goal: Will be euthymic at discharge  Description: INTERVENTIONS:  1. Administer medication as ordered  2. Provide emotional support via 1:1 interaction with staff  3. Encourage involvement in milieu/groups/activities  4. Monitor for social isolation  5/29/2022 0942 by Jose Barber  Outcome: Progressing   Pt isolates in bed, Poor eye contact on approach. Answers questions posed but declines breakfast and remains isolative to bed. Sreekanth Syed ams

## 2022-05-29 NOTE — PROGRESS NOTES
[facility] Internal Medicine  Fiorella Jay MD; Rebecca Mcleod MD; Beverly Caruso MD; MD Ritu Rutledge MD; MD ARBEN [facility]   [facility]    HISTORY AND PHYSICAL EXAMINATION            Date:   5/29/2022  Patient name:  Evan Onofre  Date of admission:  5/28/2022  5:13 AM  MRN:   [id number]  Account:  [de-identified]  YOB: 1990  PCP:    No primary care provider on file. Room:   [address] [place] [address]  Code Status:    Full Code    Chief Complaint:     Chief Complaint   Patient presents with    Suicidal   diarrhea    History Obtained From:     Pt medical record and nursing staff    History of Present Illness:     Evan Onofre is a 32 y.o. Non- / non  male who presents with Suicidal   and is admitted to the hospital for the management of Depression with suicidal ideation.     60-year-old  gentleman complains of intermittent diarrhea patient has history of GERD and gastritis and gastric ulcer  Denies any weight loss no abdominal pain no nausea vomiting    Past Medical History:     Past Medical History:   Diagnosis Date    Anxiety     Arthritis     Asthma     Bipolar I disorder, most recent episode (or current) depressed, unspecified 9/12/2014    Clostridium difficile infection     COPD (chronic obstructive pulmonary disease) ([address]     Depression     Disease of blood and blood forming organ     Eczema     Fracture, metacarpal     R 4th and 5th    Gastric ulcer     Gastritis 06/13/2018    GERD (gastroesophageal reflux disease)     GI bleed     H. 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     Substance abuse ([facility]     Hx of opiates, benzos, cocaine, alcohol abuse    Suicidal ideation     Suicide attempt by hanging ([facility]     Tobacco dependence     Ulcerative colitis ([facility]     UTI (urinary tract infection)         Past Surgical History:     Past Surgical History:   Procedure Laterality Date    ABDOMEN SURGERY      upper GI scope 7/7/2015    BRONCHOSCOPY      CHOLECYSTECTOMY, LAPAROSCOPIC  07/14/2017    surgery performed at [address] [place] [address], COLON, DIAGNOSTIC      OTHER SURGICAL HISTORY      lumbar puncture    IA COLONOSCOPY W/BIOPSY SINGLE/MULTIPLE  8/9/2017    COLONOSCOPY WITH BIOPSY performed by Ryan De Leon MD at [facility] EGD TRANSORAL BIOPSY SINGLE/MULTIPLE N/A 3/20/2017    EGD BIOPSY performed by José Miguel De Souza MD at [facility]    IA ESOPHAGOGASTRODUODENOSCOPY TRANSORAL DIAGNOSTIC N/A 8/9/2017    EGD ESOPHAGOGASTRODUODENOSCOPY performed by Ryan De Leon MD at [facility] N/A 3/20/2017    SIGMOIDOSCOPY DIAGNOSTIC FLEXIBLE performed by José Miguel De Souza MD at [facility]  2/4/16    UPPER GASTROINTESTINAL ENDOSCOPY N/A 6/13/2018    GASTRITIS    UPPER GASTROINTESTINAL ENDOSCOPY N/A 9/20/2018    EGD BIOPSY performed by Jenn Castellanos MD at [id number] [place] [address]         Medications Prior to Admission:     Prior to Admission medications    Medication Sig Start Date End Date Taking? Authorizing Provider   diphenhydrAMINE (BENADRYL) 25 MG capsule Take 1 capsule by mouth 2 times daily as needed for Itching 5/26/22   Nadean Pack, DO   ALPRAZolam Rhoderick Mullet) 0.5 MG tablet Take 0.5 mg by mouth nightly as needed for Sleep.     Historical Provider, MD   polyethylene glycol (MIRALAX) 17 g packet Take 17 g by mouth daily as needed for Constipation  Patient not taking: Reported on 5/24/2022 5/5/22 6/4/22  Lis Rehman MD   Psyllium-Calcium (METAMUCIL PLUS CALCIUM) CAPS Take 1 capsule by mouth daily Take with 8 oz water. Patient not taking: Reported on 5/24/2022 5/5/22   Lis Rehman MD   ondansetron (ZOFRAN ODT) 4 MG disintegrating tablet Take 1 tablet by mouth every 8 hours as needed for Nausea or Vomiting  Patient not taking: Reported on 5/24/2022 5/1/22   Gwendloyn Service, DO   aluminum-magnesium hydroxide 200-200 MG/5ML suspension Take 10 mLs by mouth every 6 hours as needed for Indigestion 4/22/22   Beverly Render, DO   traZODone (DESYREL) 50 MG tablet Take 1 tablet by mouth nightly as needed for Sleep  Patient not taking: Reported on 5/24/2022 4/8/22   Omar Yancey MD   DULoxetine (CYMBALTA) 20 MG extended release capsule Take 1 capsule by mouth daily  Patient not taking: Reported on 5/24/2022 4/8/22   Omar Yancey MD   meloxicam (MOBIC) 7.5 MG tablet Take 1 tablet by mouth 2 times daily as needed for Pain 8/19/21 10/30/21  MASSIEL Sandhu CNP        Allergies:     Dicyclomine, Famotidine, Geodon [ziprasidone hcl], Haloperidol, Iv dye [iodides], Reglan [metoclopramide], Ibuprofen, Aspirin, Dicyclomine hcl, Hydroxyzine hcl, Iodine, Metronidazole, Mirtazapine, Promethazine, and Ziprasidone    Social History:     Tobacco:    reports that he has been smoking cigarettes. He has a 7.50 pack-year smoking history. He has never used smokeless tobacco.  Alcohol:      reports previous alcohol use. Drug Use:  reports current drug use. Drugs: Opiates  and Cocaine.     Family History:     Family History   Problem Relation Age of Onset    Diabetes Father     Alcohol Abuse Father     Depression Father     Arthritis Father     High Blood Pressure Father     Other Father         aneurysm & epilepsy    Migraines Father     Arthritis Mother     Other Mother         aneurysm & epilepsy   Brigette Stabs Migraines Mother     Diabetes Brother         Aunt and uncles   Brigette Stabs Depression Brother     Mental Illness Brother     Other Brother         epilepsy    Migraines Brother     Stroke Other         Uncle    Other Brother         murdered Oct 6th, 2014    Colon Cancer Paternal Cousin 43    Other Sister         epilepsy    Migraines Sister        Review of Systems:     Positive and Negative as described in HPI. CONSTITUTIONAL:  negative for fevers, chills, sweats, fatigue, weight loss  HEENT:  negative for vision, hearing changes, runny nose, throat pain  RESPIRATORY:  negative for shortness of breath, cough, congestion, wheezing  CARDIOVASCULAR:  negative for chest pain, palpitations  GASTROINTESTINAL positive for diarrhea  GENITOURINARY:  negative for difficulty of urination, burning with urination, frequency   INTEGUMENT:  negative for rash, skin lesions, easy bruising   HEMATOLOGIC/LYMPHATIC:  negative for swelling/edema   ALLERGIC/IMMUNOLOGIC:  negative for urticaria , itching  ENDOCRINE:  negative increase in drinking, increase in urination, hot or cold intolerance  MUSCULOSKELETAL:  negative joint pains, muscle aches, swelling of joints  NEUROLOGICAL:  negative for headaches, dizziness, lightheadedness, numbness, pain, tingling extremities      Physical Exam:   /68   Pulse 70   Temp 97.9 °F (36.6 °C) (Oral)   Resp 18   Ht 5' 7\" (1.702 m)   Wt 200 lb (90.7 kg)   SpO2 99%   BMI 31.32 kg/m²   Temp (24hrs), Av.9 °F (36.6 °C), Min:97.9 °F (36.6 °C), Max:97.9 °F (36.6 °C)    No results for input(s): POCGLU in the last 72 hours.   No intake or output data in the 24 hours ending 22 [id number]    General Appearance: alert, well appearing, and in no acute distress  Mental status: oriented to person, place, and time  Head: normocephalic, atraumatic  Eye: no icterus, redness, pupils equal and reactive, extraocular eye movements intact, conjunctiva clear  Ear: normal external ear, no discharge, hearing intact  Nose: no drainage noted  Mouth: mucous membranes moist  Neck: supple, no carotid bruits, thyroid not palpable  Lungs: Bilateral equal air entry, clear to ausculation, no wheezing, rales or rhonchi, normal effort  Cardiovascular: normal rate, regular rhythm, no murmur, gallop, rub  Abdomen: Soft, nontender, nondistended, normal bowel sounds, no hepatomegaly or splenomegaly  Neurologic: There are no new focal motor or sensory deficits, normal muscle tone and bulk, no abnormal sensation, normal speech, cranial nerves II through XII grossly intact  Skin: No gross lesions, rashes, bruising or bleeding on exposed skin area  Extremities: peripheral pulses palpable, no pedal edema or calf pain with palpation  Psych: normal affect    Investigations:      Laboratory Testing:  No results found for this or any previous visit (from the past 24 hour(s)). Imaging/Diagnostics:  XR CHEST PORTABLE    Result Date: 5/24/2022  Normal examination. Assessment :      Hospital Problems           Last Modified POA    * (Principal) Depression with suicidal ideation 5/28/2022 Yes    Cocaine abuse ([facility] 5/28/2022 Yes    Opioid type dependence, continuous (HCC) (Chronic) 5/28/2022 Yes          Plan:     66-year-old gentleman class I obese BMI 31.32  History of gastritis and GERD complains of diarrhea  Patient is a poor historian  We will check for the gastrin levels  Check for C. difficile and stool molecular panel  Okay to use Imodium for now  May 29  Labs not done  Will sign off  Out pt with pcp with results      Desmond Klein MD  5/29/2022  3:35 PM    Copy sent to Dr. Rikki Irene primary care provider on file. Please note that this chart was generated using voice recognition Dragon dictation software. Although every effort was made to ensure the accuracy of this automated transcription, some errors in transcription may have occurred.

## 2022-05-29 NOTE — PLAN OF CARE
Problem: Self Harm/Suicidality  Goal: Will have no self-injury during hospital stay  Description: INTERVENTIONS:  1. Q 30 MINUTES: Routine safety checks  2. Q SHIFT & PRN: Assess risk to determine if routine checks are adequate to maintain patient safety  5/28/2022 2000 by Rachel Matthews LPN  Outcome: Progressing  Patient denies thoughts of self-harm and is agreeable to seeking staff should thoughts of self-harm arise. Safe environment maintained. Will continue to monitor and provide support and reassurance as needed. Problem: Depression  Goal: Will be euthymic at discharge  Description: INTERVENTIONS:  1. Administer medication as ordered  2. Provide emotional support via 1:1 interaction with staff  3. Encourage involvement in milieu/groups/activities  4. Monitor for social isolation  5/28/2022 2000 by Rachel Matthews LPN  Outcome: Progressing  Patient doesnt admit to depression but has flat affect, calm, cooperative and behavior has remained controlled. Patient denies suicidal ideations, auditory/visual hallucinations at this time.

## 2022-05-29 NOTE — PROGRESS NOTES
Daily Progress Note  5/29/2022    Patient Name: Gil Finley    CHIEF COMPLAINT: Suicidal ideation with plan and intent to stab self in the chest         SUBJECTIVE:   Patient was seen today for follow-up assessment. He was pleasant on approach and agreeable to assessment. Patient reports that he is feeling \"weak and sick\" and is requesting to restart his Suboxone. He is focused on medication and states he does not want to take the Cymbalta because he feels it is giving him a rash. He points periodically to his chest, but no redness or rashes observed. He reports being sensitive to medications. He requests Benadryl to ease sensation. He reports he has experienced this before and and then requests to start Abilify, because he has taken it in the past.  Patient endorses improvement in thoughts of harming himself and describes suicidal ideation as intermittent. He was able to get some sleep last night and has been coming out for meals and needs. He was encouraged to attend groups and interact with peers in the day area. Patient denies distressing anxiety and is not experiencing any auditory or visual hallucinations currently. He makes no delusional or paranoid statements at time of assessment. He mentions wanting to follow-up for care with [facility] after discharge. At this time patient is unable to contract for safety in the community and warrants further hospitalization for safety and stabilization.     Compliant with scheduled medications: [] Yes    [x] No  Refusing Cymbalta    Received emergency medications in past 24 hrs: [] Yes   [x] No    Appetite:  [x] Normal/Adequate/Unchanged  [] Increased  [] Decreased      Sleep:       [] Normal/Adequate/Unchanged  [x] Fair  [] Poor      Group Attendance on Unit:   [] Yes  [] Selectively    [x] No    Medication Side Effects: Reports itchy skin from Cymbalta         Mental Status Exam  Level of consciousness: Alert and awake   Appearance: Appropriate attire for setting, seated in chair, with fair  grooming and hygiene   Behavior/Motor: Approachable, somewhat restless  Attitude toward examiner: Cooperative, attentive, good eye contact   Speech: spontaneous, normal rate and normal volume   Mood: Euthymic  Affect: Mood congruent  Thought processes: linear and coherent   Thought content: Denies homicidal ideation; perseverative on medications  Suicidal Ideation: Improving; unable to contract for safety in community  Delusions: Not evident  Perceptual Disturbance: patient is not observed responding to internal stimuli  Cognition: Oriented to self, location, time, and situation  Memory: intact  Insight & Judgement: poor     Data   height is 5' 7\" (1.702 m) and weight is 200 lb (90.7 kg). His oral temperature is 97.9 °F (36.6 °C). His blood pressure is 124/68 and his pulse is 70. His respiration is 18 and oxygen saturation is 99%. Labs:   No visits with results within 2 Day(s) from this visit. Latest known visit with results is:   Admission on 05/24/2022, Discharged on 05/24/2022   Component Date Value Ref Range Status    Ventricular Rate 05/24/2022 102  BPM Final    Atrial Rate 05/24/2022 102  BPM Final    P-R Interval 05/24/2022 128  ms Final    QRS Duration 05/24/2022 84  ms Final    Q-T Interval 05/24/2022 330  ms Final    QTc Calculation (Bazett) 05/24/2022 430  ms Final    P Axis 05/24/2022 61  degrees Final    R Axis 05/24/2022 52  degrees Final    T Axis 05/24/2022 38  degrees Final         Reviewed patient's current plan of care and vital signs with nursing staff.     Labs reviewed: [x] Yes  Last EKG in EMR reviewed: [x] Yes    Medications  Current Facility-Administered Medications: buprenorphine-naloxone (SUBOXONE) 2-0.5 MG SL film 1 Film, 1 Film, SubLINGual, BID  acetaminophen (TYLENOL) tablet 650 mg, 650 mg, Oral, Q4H PRN  aluminum & magnesium hydroxide-simethicone (MAALOX) 200-200-20 MG/5ML suspension 30 mL, 30 mL, Oral, Q6H PRN  nicotine polacrilex (NICORETTE) gum 2 mg, 2 mg, Oral, PRN  polyethylene glycol (GLYCOLAX) packet 17 g, 17 g, Oral, Daily PRN  DULoxetine (CYMBALTA) extended release capsule 20 mg, 20 mg, Oral, Daily  cloNIDine (CATAPRES) tablet 0.1 mg, 0.1 mg, Oral, Q6H PRN  loperamide (IMODIUM) capsule 2 mg, 2 mg, Oral, 4x Daily PRN    ASSESSMENT  Depression with suicidal ideation         PLAN  Patient symptoms are: modestly improving   Discontinue Cymbalta; patient reports new onset urticaria    One-time order of Benadryl IM 25 mg   Patient endorses efficacy of Abilify; consider commencement   Monitor need and frequency of PRN medications. Encourage participation in groups and milieu. Attempt to develop insight. Supportive Therapy conducted. Probable discharge is per attending physician. Follow-up daily while inpatient. Patient continues to be monitored in the inpatient psychiatric facility at [facility] for safety and stabilization. Patient continues to need, on a daily basis, active treatment furnished directly by or requiring the supervision of inpatient psychiatric personnel. Electronically signed by MASSIEL Way CNP on 5/29/2022 at 4:11 PM     **This report has been created using voice recognition software. It may contain minor errors which are inherent in voice recognition technology. **

## 2022-05-29 NOTE — BH NOTE
Pt would not provide nurse with stool sample. Does not verbalize complaints of loose bowel movements today.

## 2022-05-30 PROCEDURE — 6370000000 HC RX 637 (ALT 250 FOR IP): Performed by: PSYCHIATRY & NEUROLOGY

## 2022-05-30 PROCEDURE — 6360000002 HC RX W HCPCS: Performed by: PSYCHIATRY & NEUROLOGY

## 2022-05-30 PROCEDURE — APPSS30 APP SPLIT SHARED TIME 16-30 MINUTES: Performed by: NURSE PRACTITIONER

## 2022-05-30 PROCEDURE — 99232 SBSQ HOSP IP/OBS MODERATE 35: CPT | Performed by: PSYCHIATRY & NEUROLOGY

## 2022-05-30 PROCEDURE — 1240000000 HC EMOTIONAL WELLNESS R&B

## 2022-05-30 PROCEDURE — 6360000002 HC RX W HCPCS: Performed by: NURSE PRACTITIONER

## 2022-05-30 RX ORDER — BUPRENORPHINE AND NALOXONE 2; .5 MG/1; MG/1
1 FILM, SOLUBLE BUCCAL; SUBLINGUAL 2 TIMES DAILY
Qty: 10 FILM | Refills: 0 | Status: SHIPPED | OUTPATIENT
Start: 2022-05-30 | End: 2022-05-31

## 2022-05-30 RX ORDER — VENLAFAXINE HYDROCHLORIDE 37.5 MG/1
37.5 CAPSULE, EXTENDED RELEASE ORAL
Qty: 30 CAPSULE | Refills: 0 | Status: ON HOLD | OUTPATIENT
Start: 2022-05-31 | End: 2022-06-21 | Stop reason: HOSPADM

## 2022-05-30 RX ORDER — VENLAFAXINE HYDROCHLORIDE 37.5 MG/1
37.5 CAPSULE, EXTENDED RELEASE ORAL
Status: DISCONTINUED | OUTPATIENT
Start: 2022-05-30 | End: 2022-05-31 | Stop reason: HOSPADM

## 2022-05-30 RX ORDER — DIPHENHYDRAMINE HYDROCHLORIDE 50 MG/ML
50 INJECTION INTRAMUSCULAR; INTRAVENOUS ONCE
Status: COMPLETED | OUTPATIENT
Start: 2022-05-30 | End: 2022-05-30

## 2022-05-30 RX ADMIN — NICOTINE POLACRILEX 2 MG: 2 GUM, CHEWING BUCCAL at 18:37

## 2022-05-30 RX ADMIN — NICOTINE POLACRILEX 2 MG: 2 GUM, CHEWING BUCCAL at 17:18

## 2022-05-30 RX ADMIN — NICOTINE POLACRILEX 2 MG: 2 GUM, CHEWING BUCCAL at 14:49

## 2022-05-30 RX ADMIN — CLONIDINE HYDROCHLORIDE 0.1 MG: 0.1 TABLET ORAL at 20:26

## 2022-05-30 RX ADMIN — BUPRENORPHINE AND NALOXONE 1 FILM: 2; .5 FILM BUCCAL; SUBLINGUAL at 08:56

## 2022-05-30 RX ADMIN — DIPHENHYDRAMINE HYDROCHLORIDE 50 MG: 50 INJECTION, SOLUTION INTRAMUSCULAR; INTRAVENOUS at 15:06

## 2022-05-30 RX ADMIN — NICOTINE POLACRILEX 2 MG: 2 GUM, CHEWING BUCCAL at 20:29

## 2022-05-30 RX ADMIN — NICOTINE POLACRILEX 2 MG: 2 GUM, CHEWING BUCCAL at 13:46

## 2022-05-30 RX ADMIN — NICOTINE POLACRILEX 2 MG: 2 GUM, CHEWING BUCCAL at 11:04

## 2022-05-30 RX ADMIN — NICOTINE POLACRILEX 2 MG: 2 GUM, CHEWING BUCCAL at 08:37

## 2022-05-30 RX ADMIN — NICOTINE POLACRILEX 2 MG: 2 GUM, CHEWING BUCCAL at 15:10

## 2022-05-30 RX ADMIN — BUPRENORPHINE AND NALOXONE 1 FILM: 2; .5 FILM BUCCAL; SUBLINGUAL at 20:26

## 2022-05-30 RX ADMIN — NICOTINE POLACRILEX 2 MG: 2 GUM, CHEWING BUCCAL at 22:09

## 2022-05-30 NOTE — DISCHARGE INSTR - DIET
Good nutrition is important when healing from an illness, injury, or surgery. Follow any nutrition recommendations given to you during your hospital stay. If you were given an oral nutrition supplement while in the hospital, continue to take this supplement at home. You can take it with meals, in-between meals, and/or before bedtime. These supplements can be purchased at most local grocery stores, pharmacies, and chain [employer]-stores. If you have any questions about your diet or nutrition, call the hospital and ask for the dietitian.   General diet  Increase fluids

## 2022-05-30 NOTE — PROGRESS NOTES
Daily Progress Note  5/30/2022    Patient Name: Andres Alejandre    CHIEF COMPLAINT: Suicidal ideation with plan and intent to stab self in the chest         SUBJECTIVE:   Patient was seen today for follow-up assessment. He was pleasant on approach and reporting significant improvement in urticaria following Benadryl subjective yesterday. He endorses skin is much less itchy today, but he is observed to be periodically scratching the skin of his legs. Patient reports that his mood is improving. He is focusing medications and asks writer for several things including Phenergan. Patient shares that he will be applying to [facility] recovery housing. He feels that depression has improved and he is feeling much less anxious now that Suboxone has been started. He then asks writer for a higher dose. He is denying any auditory or visual hallucinations and makes no delusional or paranoid statements during assessment. He denies suicidal and homicidal ideation. Patient asks writer for additional injection of Benadryl. He again endorses wanting to restart Abilify and states \"it really helped me to see things clearer\". He also mentions being open to starting GREEN because he has taken Abilify in the past and likes the idea of not having to take pills every day. This information is volunteered by the patient and not suggested by a writer. He is very hyper focused on his medication desires. Although improving, patient is not yet able to contract for safety in the community and warrants further hospitalization for safety and stabilization.     Compliant with scheduled medications: [] Yes    [x] No    Received emergency medications in past 24 hrs: [] Yes   [x] No    Appetite:  [x] Normal/Adequate/Unchanged  [] Increased  [] Decreased      Sleep:       [] Normal/Adequate/Unchanged  [x] Fair  [] Poor      Group Attendance on Unit:   [] Yes  [] Selectively    [x] No    Medication Side Effects: Reports itchy skin from Cymbalta         Mental Status Exam  Level of consciousness: Alert and awake   Appearance: Appropriate attire for setting, seated in chair, with fair  grooming and hygiene   Behavior/Motor: Approachable, somewhat restless  Attitude toward examiner: Cooperative, attentive, good eye contact   Speech: spontaneous, normal rate and normal volume   Mood: Euthymic  Affect: Mood congruent  Thought processes: linear and coherent   Thought content: Denies homicidal ideation; perseverative on medications  Suicidal Ideation: Improving; unable to contract for safety in community  Delusions: Not evident  Perceptual Disturbance: patient is not observed responding to internal stimuli  Cognition: Oriented to self, location, time, and situation  Memory: intact  Insight & Judgement: poor     Data   height is 5' 7\" (1.702 m) and weight is 200 lb (90.7 kg). His oral temperature is 97.6 °F (36.4 °C). His blood pressure is 107/65 and his pulse is 76. His respiration is 18 and oxygen saturation is 99%. Labs:   No visits with results within 2 Day(s) from this visit. Latest known visit with results is:   Admission on 05/24/2022, Discharged on 05/24/2022   Component Date Value Ref Range Status    Ventricular Rate 05/24/2022 102  BPM Final    Atrial Rate 05/24/2022 102  BPM Final    P-R Interval 05/24/2022 128  ms Final    QRS Duration 05/24/2022 84  ms Final    Q-T Interval 05/24/2022 330  ms Final    QTc Calculation (Bazett) 05/24/2022 430  ms Final    P Axis 05/24/2022 61  degrees Final    R Axis 05/24/2022 52  degrees Final    T Axis 05/24/2022 38  degrees Final         Reviewed patient's current plan of care and vital signs with nursing staff.     Labs reviewed: [x] Yes  Last EKG in EMR reviewed: [x] Yes    Medications  Current Facility-Administered Medications: venlafaxine (EFFEXOR XR) extended release capsule 37.5 mg, 37.5 mg, Oral, Daily with breakfast  buprenorphine-naloxone (SUBOXONE) 2-0.5 MG SL film 1 Film, 1 Film, SubLINGual, BID  acetaminophen (TYLENOL) tablet 650 mg, 650 mg, Oral, Q4H PRN  aluminum & magnesium hydroxide-simethicone (MAALOX) 200-200-20 MG/5ML suspension 30 mL, 30 mL, Oral, Q6H PRN  nicotine polacrilex (NICORETTE) gum 2 mg, 2 mg, Oral, PRN  polyethylene glycol (GLYCOLAX) packet 17 g, 17 g, Oral, Daily PRN  cloNIDine (CATAPRES) tablet 0.1 mg, 0.1 mg, Oral, Q6H PRN  loperamide (IMODIUM) capsule 2 mg, 2 mg, Oral, 4x Daily PRN    ASSESSMENT  Depression with suicidal ideation     Opiate abuse  And cocaine abuse         HANDOFF  Patient symptoms are: improving   Order additional IM Benadryl injection  Medications as determined by attending physician  Encourage participation in groups and milieu. Probable discharge is to be determined by MD    Electronically signed by MASSIEL Shaver CNP on 5/30/2022 at 4:08 PM     **This report has been created using voice recognition software. It may contain minor errors which are inherent in voice recognition technology. **                                         Psychiatry Attending Attestation     I independently saw and evaluated the patient. I reviewed the Advance Practice Provider's documentation above. Any additional comments or changes to the Advance Practice Provider's documentation are stated below otherwise agree with assessment. Patient feels better than before. Mood and affect are better. Patient reports fleeting suicidal thoughts with no intent or plan. Patient notes that these thoughts are occurring less frequently. Denies any homicidal thoughts, that was explored with the patient. Oriented to time place and person. Recent and remote memory is intact. Patient feels hopeful. Sleep and appetite is good. No side effect from medication reported. Side-effect of medication were discussed with the patient . Patient is responding to current treatment. Discharge soon, if patient continues to show improvement. Case discussed with the staff.   Discussed with him about starting Effexor and titrating to effect is agreeable to the plan. Continues to report dealing with severe itching all over through the body. Discussed about giving him IV Benadryl and is agreeable to the plan. PLAN  Patient s symptoms are improving  As stated in HPI  Attempt to develop insight  Psycho-education conducted. Supportive Therapy conducted.   Probable discharge is tomorrow  Follow-up TBD    Electronically signed by Patrice Braxton MD on 5/30/22 at 5:18 PM EDT

## 2022-05-30 NOTE — GROUP NOTE
Group Therapy Note    Date: 5/30/2022    Group Start Time: 1330  Group End Time: 343  Group Topic: Music Therapy    TIESHA PEREZ G    Locodilia Krista        Group Therapy Note    Pt did not attend music therapy group d/t resting in room despite staff invitation to attend. 1:1 talk time offered as alternative to group session, pt declined.

## 2022-05-30 NOTE — PLAN OF CARE
Problem: Depression  Goal: Will be euthymic at discharge  Description: INTERVENTIONS:  1. Administer medication as ordered  2. Provide emotional support via 1:1 interaction with staff  3. Encourage involvement in milieu/groups/activities  4. Monitor for social isolation  5/30/2022 0957 by Marcelo Weaver RN  Outcome: Progressing  Pt denies feeling depressed or anxious. Pt is at nurse desk often but redirects well. Compliant with medications. No complaints about sleep or appetite at this time. Pt repeatedly comes out of room without gown on and engages in self-talk but denies hallucinations. Problem: Self Harm/Suicidality  Goal: Will have no self-injury during hospital stay  Description: INTERVENTIONS:  1. Q 15 MINUTES: Routine safety checks  2. Q SHIFT & PRN: Assess risk to determine if routine checks are adequate to maintain patient safety  5/30/2022 0957 by Marcelo Weaver RN  Outcome: Progressing  Denies thoughts of self-harm, safety checks continue Q15 minutes.       Problem: Chronic Conditions and Co-morbidities  Goal: Patient's chronic conditions and co-morbidity symptoms are monitored and maintained or improved  5/30/2022 0957 by Marcelo Weaver RN  Outcome: Progressing

## 2022-05-30 NOTE — CARE COORDINATION
Patient completed application for [facility]. Social work faxed and emailed the application for potential discharge on Tuesday.

## 2022-05-30 NOTE — GROUP NOTE
Group Therapy Note    Date: 5/30/2022    Group Start Time: 1000  Group End Time: 4831  Group Topic: Psychotherapy    [address] [place], MSW, LSW        Group Therapy Note    Attendees: 6/18         Patient was offered group therapy today but declined to participate despite encouragement from staff. 1:1 was offered.       Signature:  Jeremy Mock MSW, LSW

## 2022-05-31 VITALS
RESPIRATION RATE: 14 BRPM | DIASTOLIC BLOOD PRESSURE: 61 MMHG | OXYGEN SATURATION: 99 % | TEMPERATURE: 97.6 F | HEART RATE: 66 BPM | HEIGHT: 67 IN | BODY MASS INDEX: 31.39 KG/M2 | SYSTOLIC BLOOD PRESSURE: 127 MMHG | WEIGHT: 200 LBS

## 2022-05-31 PROCEDURE — 6370000000 HC RX 637 (ALT 250 FOR IP): Performed by: PSYCHIATRY & NEUROLOGY

## 2022-05-31 PROCEDURE — 99232 SBSQ HOSP IP/OBS MODERATE 35: CPT | Performed by: INTERNAL MEDICINE

## 2022-05-31 PROCEDURE — 6360000002 HC RX W HCPCS: Performed by: NURSE PRACTITIONER

## 2022-05-31 PROCEDURE — 99239 HOSP IP/OBS DSCHRG MGMT >30: CPT | Performed by: PSYCHIATRY & NEUROLOGY

## 2022-05-31 RX ORDER — BUPRENORPHINE AND NALOXONE 2; .5 MG/1; MG/1
1 FILM, SOLUBLE BUCCAL; SUBLINGUAL 2 TIMES DAILY
Qty: 10 FILM | Refills: 0 | Status: SHIPPED | OUTPATIENT
Start: 2022-05-31 | End: 2022-06-05

## 2022-05-31 RX ADMIN — NICOTINE POLACRILEX 2 MG: 2 GUM, CHEWING BUCCAL at 08:10

## 2022-05-31 RX ADMIN — BUPRENORPHINE AND NALOXONE 1 FILM: 2; .5 FILM BUCCAL; SUBLINGUAL at 08:10

## 2022-05-31 NOTE — PROGRESS NOTES
[facility] Internal Medicine  Emiliano Johnston MD; Love Ramos MD; Rio Chopra MD; MD Carlos Chatman MD; MD [facility]   [facility]    HISTORY AND PHYSICAL EXAMINATION            Date:   5/31/2022  Patient name:  Rudolph Felipe  Date of admission:  5/28/2022  5:13 AM  MRN:   [id number]  Account:  [de-identified]  YOB: 1990  PCP:    No primary care provider on file. Room:   [address] [place] [address]  Code Status:    Full Code    Chief Complaint:     Chief Complaint   Patient presents with    Suicidal   diarrhea    History Obtained From:     Pt medical record and nursing staff    History of Present Illness:     Rudolph Felipe is a 32 y.o. Non- / non  male who presents with Suicidal   and is admitted to the hospital for the management of Depression with suicidal ideation.     33year-old  gentleman complains of intermittent diarrhea patient has history of GERD and gastritis and gastric ulcer  Denies any weight loss no abdominal pain no nausea vomiting    Past Medical History:     Past Medical History:   Diagnosis Date    Anxiety     Arthritis     Asthma     Bipolar I disorder, most recent episode (or current) depressed, unspecified 9/12/2014    Clostridium difficile infection     COPD (chronic obstructive pulmonary disease) ([address]     Depression     Disease of blood and blood forming organ     Eczema     Fracture, metacarpal     R 4th and 5th    Gastric ulcer     Gastritis 06/13/2018    GERD (gastroesophageal reflux disease)     GI bleed     H. 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[facility]     Substance abuse ([address] [facility]     Hx of opiates, benzos, cocaine, alcohol abuse    Suicidal ideation     Suicide attempt by hanging ([address] [facility]     Tobacco dependence     Ulcerative colitis ([address]     UTI (urinary tract infection)         Past Surgical History:     Past Surgical History:   Procedure Laterality Date    ABDOMEN SURGERY      upper GI scope 7/7/2015    BRONCHOSCOPY      CHOLECYSTECTOMY, LAPAROSCOPIC  07/14/2017    surgery performed at [address] [place] [address], COLON, DIAGNOSTIC      OTHER SURGICAL HISTORY      lumbar puncture    IN COLONOSCOPY W/BIOPSY SINGLE/MULTIPLE  8/9/2017    COLONOSCOPY WITH BIOPSY performed by Alap Godfrey MD at [facility] EGD TRANSORAL BIOPSY SINGLE/MULTIPLE N/A 3/20/2017    EGD BIOPSY performed by Almas Fernandez MD at [facility]    IN ESOPHAGOGASTRODUODENOSCOPY TRANSORAL DIAGNOSTIC N/A 8/9/2017    EGD ESOPHAGOGASTRODUODENOSCOPY performed by Alpa Godfrey MD at [facility] N/A 3/20/2017    SIGMOIDOSCOPY DIAGNOSTIC FLEXIBLE performed by Almas Fernadnez MD at [address] [place] [address]  2/4/16    UPPER GASTROINTESTINAL ENDOSCOPY N/A 6/13/2018    GASTRITIS    UPPER GASTROINTESTINAL ENDOSCOPY N/A 9/20/2018    EGD BIOPSY performed by Hood Reyes MD at [id number] [place] [address]        Medications Prior to Admission:     Prior to Admission medications    Medication Sig Start Date End Date Taking? Authorizing Provider   venlafaxine (EFFEXOR XR) 37.5 MG extended release capsule Take 1 capsule by mouth daily (with breakfast) 5/31/22  Yes Ashleigh Manning MD   buprenorphine-naloxone (SUBOXONE) 2-0.5 MG FILM SL film Place 1 Film under the tongue 2 times daily for 5 days.  5/30/22 6/4/22 Yes Ashleigh Manning MD   polyethylene glycol (MIRALAX) 17 g packet Take 17 g by mouth daily as needed for Constipation  Patient not taking: Reported on 5/24/2022 5/5/22 6/4/22  Griselda Requena, MD   meloxicam (MOBIC) 7.5 MG tablet Take 1 tablet by mouth 2 times daily as needed for Pain 8/19/21 10/30/21  MASSIEL García - CNP        Allergies:     Dicyclomine, Famotidine, Geodon [ziprasidone hcl], Haloperidol, Iv dye [iodides], Reglan [metoclopramide], Ibuprofen, Aspirin, Dicyclomine hcl, Hydroxyzine hcl, Iodine, Metronidazole, Mirtazapine, Promethazine, and Ziprasidone    Social History:     Tobacco:    reports that he has been smoking cigarettes. He has a 7.50 pack-year smoking history. He has never used smokeless tobacco.  Alcohol:      reports previous alcohol use. Drug Use:  reports current drug use. Drugs: Opiates  and Cocaine. Family History:     Family History   Problem Relation Age of Onset    Diabetes Father     Alcohol Abuse Father     Depression Father     Arthritis Father     High Blood Pressure Father     Other Father         aneurysm & epilepsy    Migraines Father     Arthritis Mother     Other Mother         aneurysm & epilepsy    Migraines Mother     Diabetes Brother         Aunt and uncles    Depression Brother     Mental Illness Brother     Other Brother         epilepsy    Migraines Brother     Stroke Other         Uncle    Other Brother         murdered Oct 6th, 2014    Colon Cancer Paternal Cousin 43    Other Sister         epilepsy    Migraines Sister        Review of Systems:     Positive and Negative as described in HPI.     CONSTITUTIONAL:  negative for fevers, chills, sweats, fatigue, weight loss  HEENT:  negative for vision, hearing changes, runny nose, throat pain  RESPIRATORY:  negative for shortness of breath, cough, congestion, wheezing  CARDIOVASCULAR:  negative for chest pain, palpitations  GASTROINTESTINAL positive for diarrhea  GENITOURINARY:  negative for difficulty of urination, burning with urination, frequency   INTEGUMENT:  negative for rash, skin lesions, easy bruising   HEMATOLOGIC/LYMPHATIC:  negative for swelling/edema   ALLERGIC/IMMUNOLOGIC:  negative for urticaria , itching  ENDOCRINE:  negative increase in drinking, increase in urination, hot or cold intolerance  MUSCULOSKELETAL:  negative joint pains, muscle aches, swelling of joints  NEUROLOGICAL:  negative for headaches, dizziness, lightheadedness, numbness, pain, tingling extremities      Physical Exam:   /61   Pulse 66   Temp 97.6 °F (36.4 °C) (Oral)   Resp 14   Ht 5' 7\" (1.702 m)   Wt 200 lb (90.7 kg)   SpO2 99%   BMI 31.32 kg/m²   Temp (24hrs), Av.6 °F (36.4 °C), Min:97.6 °F (36.4 °C), Max:97.6 °F (36.4 °C)    No results for input(s): POCGLU in the last 72 hours.   No intake or output data in the 24 hours ending 22 1050    General Appearance: alert, well appearing, and in no acute distress  Mental status: oriented to person, place, and time  Head: normocephalic, atraumatic  Eye: no icterus, redness, pupils equal and reactive, extraocular eye movements intact, conjunctiva clear  Ear: normal external ear, no discharge, hearing intact  Nose: no drainage noted  Mouth: mucous membranes moist  Neck: supple, no carotid bruits, thyroid not palpable  Lungs: Bilateral equal air entry, clear to ausculation, no wheezing, rales or rhonchi, normal effort  Cardiovascular: normal rate, regular rhythm, no murmur, gallop, rub  Abdomen: Soft, nontender, nondistended, normal bowel sounds, no hepatomegaly or splenomegaly  Neurologic: There are no new focal motor or sensory deficits, normal muscle tone and bulk, no abnormal sensation, normal speech, cranial nerves II through XII grossly intact  Skin: No gross lesions, rashes, bruising or bleeding on exposed skin area  Extremities: peripheral pulses palpable, no pedal edema or calf pain with palpation  Psych: normal affect    Investigations:      Laboratory Testing:  No results found for this or any previous visit (from the past 24 hour(s)). Imaging/Diagnostics:  XR CHEST PORTABLE    Result Date: 5/24/2022  Normal examination. Assessment :      Hospital Problems           Last Modified POA    * (Principal) Depression with suicidal ideation 5/28/2022 Yes    Cocaine abuse ([address] 5/28/2022 Yes    Opioid type dependence, continuous (HCC) (Chronic) 5/28/2022 Yes          Plan:     70-year-old gentleman class I obese BMI 31.32  History of gastritis and GERD complains of diarrhea  Patient is a poor historian  We will check for the gastrin levels  Check for C. difficile and stool molecular panel  Okay to use Imodium for now        Minesh Nascimento MD  5/31/2022  10:50 AM    Copy sent to Dr. Britney Howell primary care provider on file. Please note that this chart was generated using voice recognition Dragon dictation software. Although every effort was made to ensure the accuracy of this automated transcription, some errors in transcription may have occurred.
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
## 2022-05-31 NOTE — BH NOTE
Patient given tobacco quitline number 09153752173 at this time, refusing to call at this time, states \" I just dont want to quit now\"- patient given information as to the dangers of long term tobacco use. Continue to reinforce the importance of tobacco cessation.

## 2022-05-31 NOTE — BH NOTE
Patient refuses to take Effexor in the morning. Patient stated \"I don't know why they prescribe me that. \"

## 2022-05-31 NOTE — PROGRESS NOTES
CLINICAL PHARMACY NOTE: MEDS TO BEDS    Total # of Prescriptions Filled: 1   The following medications were delivered to the patient:  · Venlafaxine HCL ER 37.5mg    Additional Documentation:  Delivered Medication to Nurses Station     Suboxone out of stock -  sending to home pharmacy

## 2022-05-31 NOTE — GROUP NOTE
Group Therapy Note    Date: 5/31/2022    Group Start Time: 1100  Group End Time: 1140  Group Topic: Music Therapy    TIESHA BHPOWER G    Locodilia Krista        Group Therapy Note    Pt did not attend music therapy group d/t resting in room despite staff invitation to attend. 1:1 talk time offered as alternative to group session, pt declined.

## 2022-05-31 NOTE — PLAN OF CARE
Problem: Self Harm/Suicidality  Goal: Will have no self-injury during hospital stay  Description: INTERVENTIONS:  1. Q 30 MINUTES: Routine safety checks  2. Q SHIFT & PRN: Assess risk to determine if routine checks are adequate to maintain patient safety  5/30/2022 2049 by Vamsi Aguilar RN  Outcome: Progressing     Problem: Depression  Goal: Will be euthymic at discharge  Description: INTERVENTIONS:  1. Administer medication as ordered  2. Provide emotional support via 1:1 interaction with staff  3. Encourage involvement in milieu/groups/activities  4. Monitor for social isolation  5/30/2022 2049 by Vamsi Aguilar RN  Outcome: Progressing   Pt denies suicidal thoughts at this time. Pt denies hallucinations at this time, is noted to be engaging in self talk while alone. Pt denies paranoia but states he does not want to go into his room for non-specified reasons. Pt remains focused on getting IM Benadryl despite having 1 time dose ordered and given earlier in day. Pt relates he is ready for discharge.

## 2022-05-31 NOTE — GROUP NOTE
HS Goal Group     Date: May 30, 2022   Group Start Time: 2000   Group End Time: 2025   [facility]   Attendees: 10/18     Group Topic: HS Goal Group   Notes: Patient participated in HS goal group. Status After Intervention: Improved   Participation Level:  Active Listener and Interactive   Participation Quality: Appropriate, attentive and supportive   Speech: Normal   Thought Process/Content: Logical and linear   Affective Functioning: Congruent   Mood: WDL   Level of consciousness: Oriented x4   Response to Learning: Progressing to goal; able to verbalize current knowledge/experience, acknowledge new learning, retain information and change behavior    Endings: None reported   Modes of Intervention: Education and exploration       Discipline Responsible: Behavioral Health Tech   Signature: GABRIELLE Ruvalcaba

## 2022-05-31 NOTE — BH NOTE
Pharmacy called and stated that they do not have supply of Suboxone 2-0.5 mg. Pharmacy asked if staff can send the Suboxone to his pharmacy. Patient reports he uses [facility] on [address]. Doctor Les notified via [employer], stated \"ok. \"

## 2022-05-31 NOTE — DISCHARGE INSTR - COC
Continuity of Care Form    Patient Name: Martita Marte   :  1990  MRN:  [id number]    Admit date:  2022  Discharge date:  ***    Code Status Order: Full Code   Advance Directives:      Admitting Physician:  Carmen Melendez MD  PCP: No primary care provider on file. Discharging Nurse: [facility] Unit/Room#: 0203/0203-01  Discharging Unit Phone Number: ***    Emergency Contact:   Extended Emergency Contact Information  Primary Emergency Contact: [facility]  Address: [address] [place] [address] Phone: [phone]  Work Phone: [phone]  Mobile Phone: [phone]  Relation: Parent  Hearing or visual needs: None  Other needs: None  Preferred language: English   needed?  No    Past Surgical History:  Past Surgical History:   Procedure Laterality Date    ABDOMEN SURGERY      upper GI scope 2015    BRONCHOSCOPY      CHOLECYSTECTOMY, LAPAROSCOPIC  2017    surgery performed at [facility], COLON, DIAGNOSTIC      OTHER SURGICAL HISTORY      lumbar puncture    [place] COLONOSCOPY W/BIOPSY SINGLE/MULTIPLE  2017    COLONOSCOPY WITH BIOPSY performed by Allegra Peter MD at [facility] EGD TRANSORAL BIOPSY SINGLE/MULTIPLE N/A 3/20/2017    EGD BIOPSY performed by Kaushal Irby MD at [facility]    [place] ESOPHAGOGASTRODUODENOSCOPY TRANSORAL DIAGNOSTIC N/A 2017    EGD ESOPHAGOGASTRODUODENOSCOPY performed by Allegra Peter MD at [facility] N/A 3/20/2017    SIGMOIDOSCOPY DIAGNOSTIC FLEXIBLE performed by Kaushal Irby MD at [address] [place] [address]  16    UPPER GASTROINTESTINAL ENDOSCOPY N/A 2018    GASTRITIS    UPPER GASTROINTESTINAL ENDOSCOPY N/A 2018    EGD BIOPSY performed by Cong Hope MD at [address] [place] [address] Kenia Gamboa       Immunization History:   Immunization History   Administered Date(s) Administered    COVID-19, Pfizer Purple top, DILUTE for use, 12+ yrs, 30mcg/0.3mL dose 09/03/2021    Hepatitis A/Hepatitis B (Twinrix) 01/09/2020    Influenza Virus Vaccine 09/30/2013, 09/30/2013    Pneumococcal Polysaccharide (Wimhcpets87) 06/20/2016    Tdap (Boostrix, Adacel) 04/12/2022       Active Problems:  Patient Active Problem List   Diagnosis Code    Opioid abuse ([facility] F11.10    Noncompliance Z91.19    Rectal bleeding K62.5    Smoker F17.200    Juvenile rheumatoid arthritis ([facility] M08.00    SRIRAM (obstructive sleep apnea) G47.33    Primary insomnia F51.01    Calculus of bile duct with acute on chronic cholecystitis K80.46    Mild intermittent asthma without complication V40.62    Gastritis K29.70    Generalized abdominal pain R10.84    Anemia D64.9    Elevated liver enzymes R74.8    Nausea and vomiting R11.2    Opioid type dependence, continuous (HCC) F11.20    Abdominal pain R10.9    Diarrhea R19.7    Irritable bowel syndrome with both constipation and diarrhea K58.2    Schizoaffective disorder, bipolar type ([facility] F25.0    GI bleed K92.2    Depression with suicidal ideation F32. A, R45.851    Schizoaffective disorder ([facility] F25.9    MDD (major depressive disorder), recurrent severe, without psychosis ([facility] F33.2    Severe episode of recurrent major depressive disorder, without psychotic features ([facility] F33.2    Diabetes mellitus type 2 in obese ([facility] E11.69, E66.9    Mixed hyperlipidemia E78.2    Cocaine abuse ([facility] F14.10       Isolation/Infection:   Isolation            No Isolation          Patient Infection Status       Infection Onset Added Last Indicated Last Indicated By Review Planned Expiration Resolved Resolved By    None active    Resolved    C-diff Rule Out 05/28/22 05/28/22 05/28/22 C DIFF TOXIN/ANTIGEN (Ordered)   05/31/22 Cheyenne Shrestha RN    Order d/c'd    ZTH-29 (Rule Out) 01/06/22 01/06/22 01/07/22 COVID-19, Rapid (Ordered)   01/07/22 Rule-Out Test Resulted    COVID-19 (Rule Out) 21 COVID-19 (Ordered)   21 Rule-Out Test Resulted    COVID-19 (Rule Out) 21 COVID-19, Rapid (Ordered)   21 Rule-Out Test Resulted    COVID-19 (Rule Out) 21 COVID-19 (Ordered)   21 Rule-Out Test Resulted            Nurse Assessment:  Last Vital Signs: /61   Pulse 66   Temp 97.6 °F (36.4 °C) (Oral)   Resp 14   Ht 5' 7\" (1.702 m)   Wt 200 lb (90.7 kg)   SpO2 99%   BMI 31.32 kg/m²     Last documented pain score (0-10 scale): Pain Level: 0  Last Weight:   Wt Readings from Last 1 Encounters:   22 200 lb (90.7 kg)     Mental Status:  {IP PT MENTAL STATUS:70017}    IV Access:  { SADAF IV ACCESS:432354685}    Nursing Mobility/ADLs:  Walking   {CHP DME YWC}  Transfer  {CHP DME UUWF:380911545}  Bathing  {CHP DME EEAQ:784428952}  Dressing  {CHP DME DERF:125574266}  Toileting  {CHP DME HNLD:385323995}  Feeding  {P DME XBPW:485963385}  Med Admin  {P DME UFKB:739184783}  Med Delivery   { SADAF MED Delivery:795341595}    Wound Care Documentation and Therapy:        Elimination:  Continence: Bowel: {YES / AW:45659}  Bladder: {YES / V}  Urinary Catheter: {Urinary Catheter:459760369}   Colostomy/Ileostomy/Ileal Conduit: {YES / EL:50994}       Date of Last BM: ***  No intake or output data in the 24 hours ending 22 0900  No intake/output data recorded.     Safety Concerns:     [address] Safety Concerns:253964342}    Impairments/Disabilities:      [address] Impairments/Disabilities:472357324}    Nutrition Therapy:  Current Nutrition Therapy:   [address] Diet List:585498153}    Routes of Feeding: {CHP DME Other Feedings:255022175}  Liquids: {Slp liquid thickness:75722}  Daily Fluid Restriction: {CHP DME Yes amt example:085121173}  Last Modified Barium Swallow with Video (Video Swallowing Test): {Done Not Done ZHYO:008194381}    Treatments at the Time of Hospital Discharge:   Respiratory Treatments: ***  Oxygen Therapy:  {Therapy; copd oxygen:53203}  Ventilator:    { CC Vent CXAP:984152373}    Rehab Therapies: {THERAPEUTIC INTERVENTION:7953183760}  Weight Bearing Status/Restrictions: 50Britt ARRIETA Weight Bearin}  Other Medical Equipment (for information only, NOT a DME order):  {EQUIPMENT:651376675}  Other Treatments: ***    Patient's personal belongings (please select all that are sent with patient):  {[facility] DME Belongings:600578397}    RN SIGNATURE:  {Esignature:568718768}    CASE MANAGEMENT/SOCIAL WORK SECTION    Inpatient Status Date: ***    Readmission Risk Assessment Score:  Readmission Risk              Risk of Unplanned Readmission:  92           Discharging to Facility/ Agency   Name:   Address:  Phone:  Fax:    Dialysis Facility (if applicable)   Name:  Address:  Dialysis Schedule:  Phone:  Fax:    / signature: {Esignature:221356786}    PHYSICIAN SECTION    Prognosis: {Prognosis:9389757632}    Condition at Discharge: [address] Patient Condition:917301677}    Rehab Potential (if transferring to Rehab): {Prognosis:0032408683}    Recommended Labs or Other Treatments After Discharge: ***    Physician Certification: I certify the above information and transfer of Mary Jane Ortiz  is necessary for the continuing treatment of the diagnosis listed and that he requires {Admit to Appropriate Level of Care:53761} for {GREATER/LESS:808703154} 30 days.      Update Admission H&P: {P DME Changes in [facility]:627844893}    PHYSICIAN SIGNATURE:  {Esignature:736679068}

## 2022-06-02 NOTE — CARE COORDINATION
Name: Evan Onofre    : 1990    Discharge Date: 22    Primary Auth/Cert #: [id number]    Destination: Private residence    Discharge Medications:      Medication List      START taking these medications    buprenorphine-naloxone 2-0.5 MG Film SL film  Commonly known as: SUBOXONE  Place 1 Film under the tongue 2 times daily for 5 days.      venlafaxine 37.5 MG extended release capsule  Commonly known as: EFFEXOR XR  Take 1 capsule by mouth daily (with breakfast)  Notes to patient: Mood        CONTINUE taking these medications    polyethylene glycol 17 g packet  Commonly known as: MiraLax  Take 17 g by mouth daily as needed for Constipation  Notes to patient: Constipation        STOP taking these medications    ALPRAZolam 0.5 MG tablet  Commonly known as: XANAX     aluminum-magnesium hydroxide 200-200 MG/5ML suspension     diphenhydrAMINE 25 MG capsule  Commonly known as: BENADRYL     DULoxetine 20 MG extended release capsule  Commonly known as: CYMBALTA     meloxicam 7.5 MG tablet  Commonly known as: Mobic     Metamucil Plus Calcium Caps     ondansetron 4 MG disintegrating tablet  Commonly known as: Zofran ODT     traZODone 50 MG tablet  Commonly known as: DESYREL           Where to Get Your Medications      These medications were sent to [facility]  [address], [address] [address]    Hours: [facility] (Mon-Fri 9AM-5:30PM) Phone: [phone]   · venlafaxine 37.5 MG extended release capsule     You can get these medications from any pharmacy    Bring a paper prescription for each of these medications  · buprenorphine-naloxone 2-0.5 MG Film SL film     Pharmacy Instructions:    Medications sent with you from [facility]           Follow Up Appointment: Marcy [address]o [address]  [address] [facility] [phone]  On 2022  10:30am

## 2022-06-03 ENCOUNTER — HOSPITAL ENCOUNTER (EMERGENCY)
Age: 32
Discharge: HOME | End: 2022-06-03
Attending: EMERGENCY MEDICINE
Payer: COMMERCIAL

## 2022-06-03 ENCOUNTER — HOSPITAL ENCOUNTER (EMERGENCY)
Age: 32
Discharge: HOME | End: 2022-06-03
Attending: STUDENT IN AN ORGANIZED HEALTH CARE EDUCATION/TRAINING PROGRAM
Payer: COMMERCIAL

## 2022-06-03 VITALS
SYSTOLIC BLOOD PRESSURE: 123 MMHG | WEIGHT: 180 LBS | TEMPERATURE: 98.2 F | HEIGHT: 67 IN | RESPIRATION RATE: 16 BRPM | DIASTOLIC BLOOD PRESSURE: 70 MMHG | BODY MASS INDEX: 28.25 KG/M2 | OXYGEN SATURATION: 96 % | HEART RATE: 85 BPM

## 2022-06-03 VITALS
TEMPERATURE: 97.6 F | BODY MASS INDEX: 29.01 KG/M2 | HEART RATE: 73 BPM | SYSTOLIC BLOOD PRESSURE: 134 MMHG | OXYGEN SATURATION: 97 % | RESPIRATION RATE: 20 BRPM | WEIGHT: 185.2 LBS | DIASTOLIC BLOOD PRESSURE: 104 MMHG

## 2022-06-03 DIAGNOSIS — Z76.5 MALINGERING: Primary | ICD-10-CM

## 2022-06-03 DIAGNOSIS — L29.9 PRURITIC DISORDER: ICD-10-CM

## 2022-06-03 DIAGNOSIS — J02.9 SORE THROAT: Primary | ICD-10-CM

## 2022-06-03 PROCEDURE — 6370000000 HC RX 637 (ALT 250 FOR IP): Performed by: STUDENT IN AN ORGANIZED HEALTH CARE EDUCATION/TRAINING PROGRAM

## 2022-06-03 PROCEDURE — 99285 EMERGENCY DEPT VISIT HI MDM: CPT

## 2022-06-03 PROCEDURE — 99283 EMERGENCY DEPT VISIT LOW MDM: CPT

## 2022-06-03 RX ORDER — DIPHENHYDRAMINE HCL 25 MG
25 TABLET ORAL ONCE
Status: DISCONTINUED | OUTPATIENT
Start: 2022-06-03 | End: 2022-06-04 | Stop reason: HOSPADM

## 2022-06-03 RX ORDER — ACETAMINOPHEN 500 MG
1000 TABLET ORAL EVERY 6 HOURS PRN
Qty: 60 TABLET | Refills: 0 | Status: ON HOLD | OUTPATIENT
Start: 2022-06-03 | End: 2022-06-21 | Stop reason: HOSPADM

## 2022-06-03 RX ORDER — ACETAMINOPHEN 500 MG
1000 TABLET ORAL ONCE
Status: COMPLETED | OUTPATIENT
Start: 2022-06-03 | End: 2022-06-03

## 2022-06-03 RX ORDER — LORATADINE 10 MG/1
10 TABLET ORAL DAILY
Qty: 30 TABLET | Refills: 0 | Status: ON HOLD | OUTPATIENT
Start: 2022-06-03 | End: 2022-06-21 | Stop reason: HOSPADM

## 2022-06-03 RX ADMIN — ACETAMINOPHEN 1000 MG: 500 TABLET ORAL at 01:58

## 2022-06-03 ASSESSMENT — ENCOUNTER SYMPTOMS
SORE THROAT: 1
EYE REDNESS: 0
COLOR CHANGE: 0
ABDOMINAL PAIN: 0
SHORTNESS OF BREATH: 0
EYE DISCHARGE: 0

## 2022-06-03 ASSESSMENT — PAIN - FUNCTIONAL ASSESSMENT: PAIN_FUNCTIONAL_ASSESSMENT: NONE - DENIES PAIN

## 2022-06-03 ASSESSMENT — PAIN SCALES - GENERAL: PAINLEVEL_OUTOF10: 3

## 2022-06-03 NOTE — ED NOTES
Pt presents to the er c/o a sore throat pt refuses to let me swab his throat and states that he wants to speak to the doctor dr Nova Miller notified     Emily Wheeler RN  06/03/22 3150

## 2022-06-03 NOTE — ED PROVIDER NOTES
[facility]  Emergency Department Encounter     Pt Name: Martita Marte  MRN: [id number]  [place] 1990  Date of evaluation: 6/3/22  PCP:  No primary care provider on file. CHIEF COMPLAINT       Chief Complaint   Patient presents with    Pharyngitis       HISTORY OFPRESENT ILLNESS  (Location/Symptom, Timing/Onset, Context/Setting, Quality, Duration, Modifying Factors,Severity.)      Martita Marte is a 32 y.o. male who presents with sore throat. Patient recently was admitted to [facility]. Discharged presenting today with sore throat. Patient with history of bipolar as well as significant substance use disorder. Denies any shortness of breath, chest pain. Mild posterior oropharynx pain. Worse with swallowing. Endorsing some nasal congestion as well. PAST MEDICAL / SURGICAL / SOCIAL / FAMILY HISTORY      has a past medical history of Anxiety, Arthritis, Asthma, Bipolar I disorder, most recent episode (or current) depressed, unspecified, Clostridium difficile infection, COPD (chronic obstructive pulmonary disease) ([address] Depression, Disease of blood and blood forming organ, Eczema, Fracture, metacarpal, Gastric ulcer, Gastritis, GERD (gastroesophageal reflux disease), GI bleed, H. pylori infection, H/O blood clots, Head injury, Headache, Insomnia, Juvenile rheumatoid arthritis ([address] Neuromuscular disorder ([address] PFAPA syndrome ([address] PUD (peptic ulcer disease), Rheumatoid arthritis ([address] Rheumatoid arthritis(714.0), Severe recurrent major depression without psychotic features ([address] Sleep apnea, 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[address] Substance abuse ([address] Suicidal ideation, Suicide attempt by hanging ([address] Tobacco dependence, Ulcerative colitis ([address] and UTI (urinary tract infection). has a past surgical history that includes Colonoscopy; bronchoscopy; other surgical history;  Upper gastrointestinal endoscopy (2/4/16); pr egd transoral biopsy single/multiple (N/A, 3/20/2017); sigmoidoscopy (N/A, 3/20/2017); Cholecystectomy, laparoscopic (07/14/2017); pr esophagogastroduodenoscopy transoral diagnostic (N/A, 8/9/2017); pr colonoscopy w/biopsy single/multiple (8/9/2017); Abdomen surgery; Upper gastrointestinal endoscopy (N/A, 6/13/2018); Upper gastrointestinal endoscopy (N/A, 9/20/2018); and Endoscopy, colon, diagnostic. Social History     Socioeconomic History    Marital status: Single     Spouse name: Not on file    Number of children: Not on file    Years of education: Not on file    Highest education level: Not on file   Occupational History    Occupation: disability   Tobacco Use    Smoking status: Current Every Day Smoker     Packs/day: 0.50     Years: 15.00     Pack years: 7.50     Types: Cigarettes    Smokeless tobacco: Never Used   Vaping Use    Vaping Use: Former   Substance and Sexual Activity    Alcohol use: Not Currently     Comment: drinks daily    Drug use: Yes     Types: Opiates , Cocaine     Comment: Hx of opiates, benzos, cocaine, alcohol abuse    Sexual activity: Yes     Partners: Female     Comment: Lives alone, not working. Other Topics Concern    Not on file   Social History Narrative    ** Merged History Encounter **          Social Determinants of Health     Financial Resource Strain: Medium Risk    Difficulty of Paying Living Expenses: Somewhat hard   Food Insecurity: No Food Insecurity    Worried About Running Out of Food in the Last Year: Never true    M[place] of Food in the Last Year: Never true   Transportation Needs: No Transportation Needs    Lack of Transportation (Medical): No    Lack of Transportation (Non-Medical):  No   Physical Activity: Inactive    Days of Exercise per Week: 0 days    Minutes of Exercise per Session: 0 min   Stress: Stress Concern Present    Feeling of Stress : Rather much   Social Connections: Socially Isolated    Frequency of Communication with Friends and Family: Never    Frequency of Social Gatherings with Friends and Family: Never    Attends Temple Services: Never    Active Member of Clubs or Organizations: No    Attends Club or Organization Meetings: Never    Marital Status: Never    Intimate Partner Violence: Not At Risk    Fear of Current or Ex-Partner: No    Emotionally Abused: No    Physically Abused: No    Sexually Abused: No   Housing Stability: Low Risk     Unable to Pay for Housing in the Last Year: No    Number of [place] in the Last Year: 1    Unstable Housing in the Last Year: No       Family History   Problem Relation Age of Onset    Diabetes Father     Alcohol Abuse Father     Depression Father     Arthritis Father     High Blood Pressure Father     Other Father         aneurysm & epilepsy    Migraines Father     Arthritis Mother     Other Mother         aneurysm & epilepsy    Migraines Mother     Diabetes Brother         Aunt and uncles    Depression Brother     Mental Illness Brother     Other Brother         epilepsy    Migraines Brother     Stroke Other         Uncle    Other Brother         murdered Oct 6th, 2014    Colon Cancer Paternal Cousin 43    Other Sister         epilepsy   Zannelio Cowden Migraines Sister        Allergies:  Dicyclomine, Famotidine, Geodon [ziprasidone hcl], Haloperidol, Iv dye [iodides], Reglan [metoclopramide], Ibuprofen, Aspirin, Dicyclomine hcl, Hydroxyzine hcl, Iodine, Metronidazole, Mirtazapine, Promethazine, and Ziprasidone    Home Medications:  Prior to Admission medications    Medication Sig Start Date End Date Taking? Authorizing Provider   acetaminophen (TYLENOL) 500 MG tablet Take 2 tablets by mouth every 6 hours as needed for Pain or Fever 6/3/22  Yes Evaristo Situ, DO   loratadine (CLARITIN) 10 MG tablet Take 1 tablet by mouth daily 6/3/22  Yes Evaristo Situ, DO   buprenorphine-naloxone (SUBOXONE) 2-0.5 MG FILM SL film Place 1 Film under the tongue 2 times daily for 5 days.  5/31/22 6/5/22  Katelyn Dominguez MD venlafaxine (EFFEXOR XR) 37.5 MG extended release capsule Take 1 capsule by mouth daily (with breakfast) 5/31/22   Reena Stewart MD   polyethylene glycol (MIRALAX) 17 g packet Take 17 g by mouth daily as needed for Constipation  Patient not taking: Reported on 5/24/2022 5/5/22 6/4/22  Guanaco Nunes MD   meloxicam (MOBIC) 7.5 MG tablet Take 1 tablet by mouth 2 times daily as needed for Pain 8/19/21 10/30/21  Rolf Talbot, APRN - CNP       REVIEW OF SYSTEMS    (2-9 systems for level 4, 10 or more for level 5)      Review of Systems   Constitutional: Negative for chills and fever. HENT: Positive for congestion and sore throat. Eyes: Negative for discharge and redness. Respiratory: Negative for shortness of breath. Cardiovascular: Negative for chest pain. Gastrointestinal: Negative for abdominal pain. Genitourinary: Negative for dysuria. Musculoskeletal: Negative for arthralgias. Skin: Negative for color change and rash. Allergic/Immunologic: Positive for environmental allergies. Neurological: Negative for headaches. Psychiatric/Behavioral: Negative for agitation and confusion. PHYSICAL EXAM   (up to 7 for level 4, 8 or more for level 5)     INITIAL VITALS:    weight is 185 lb 3.2 oz (84 kg). His temperature is 97.6 °F (36.4 °C). His blood pressure is 134/104 (abnormal) and his pulse is 73. His respiration is 20 and oxygen saturation is 97%. Physical Exam  Vitals and nursing note reviewed. Constitutional:       Appearance: He is well-developed. HENT:      Head: Normocephalic and atraumatic. Nose: Nose normal.      Mouth/Throat:      Comments: Slight posterior injection, no exudates, no tongue elevation  Eyes:      General: No scleral icterus. Conjunctiva/sclera: Conjunctivae normal.      Pupils: Pupils are equal, round, and reactive to light. Cardiovascular:      Rate and Rhythm: Normal rate and regular rhythm. Heart sounds: Normal heart sounds.  No murmur heard. No friction rub. No gallop. Pulmonary:      Effort: Pulmonary effort is normal. No respiratory distress. Breath sounds: Normal breath sounds. No wheezing or rales. Musculoskeletal:         General: Normal range of motion. Skin:     General: Skin is warm and dry. Findings: No erythema or rash. Neurological:      Mental Status: He is alert and oriented to person, place, and time. Psychiatric:      Comments: Flat affect, good eye contact         DIFFERENTIAL  DIAGNOSIS     PLAN (LABS / IMAGING / EKG):  No orders of the defined types were placed in this encounter. MEDICATIONS ORDERED:  Orders Placed This Encounter   Medications    acetaminophen (TYLENOL) tablet 1,000 mg    acetaminophen (TYLENOL) 500 MG tablet     Sig: Take 2 tablets by mouth every 6 hours as needed for Pain or Fever     Dispense:  60 tablet     Refill:  0    loratadine (CLARITIN) 10 MG tablet     Sig: Take 1 tablet by mouth daily     Dispense:  30 tablet     Refill:  0       DDX: Viral sore throat for strep throat versus COVID    Initial MDM/Plan: 32 y.o. male who presents with sore throat. Patient is otherwise nontoxic overall well-appearing. Recently admitted at [facility]. Suspect viral illness. Low suspicion for any acute posterior throat infection. Symptomatic treatment. Not appearing acutely psychotic with linear thought process. Follow-up with psychiatrist.    DIAGNOSTIC RESULTS / [address] [place] [address] / [facility]     LABS:  Labs Reviewed - No data to display      RADIOLOGY:  No results found. EMERGENCY DEPARTMENT COURSE:        · Based on the low acuity of concerning symptoms and improvement of symptoms, patient will be discharged with follow up and prescription information listed in the Disposition section. · Patient states they will follow-up with primary care physician and/or return to the emergency department should they experience a change or worsening of symptoms.   · Patient will be discharged with resources: summary of visit, instructions, follow-up information, prescriptions if necessary. · Patient/ family instructed to read discharge paperwork. All of their questions and concerns were addressed. · Suspicion for any acute life-threatening processes is low. Patient voices understanding of plan. PROCEDURES:  None    CONSULTS:  None    CRITICAL CARE:  0    FINAL IMPRESSION      1. Sore throat          DISPOSITION / PLAN     DISPOSITION Decision To Discharge 06/03/2022 01:48:51 AM    Discharge    PATIENTREFERRED TO:  No follow-up provider specified.     DISCHARGE MEDICATIONS:  Discharge Medication List as of 6/3/2022  2:00 AM      START taking these medications    Details   acetaminophen (TYLENOL) 500 MG tablet Take 2 tablets by mouth every 6 hours as needed for Pain or Fever, Disp-60 tablet, R-0Print      loratadine (CLARITIN) 10 MG tablet Take 1 tablet by mouth daily, Disp-30 tablet, R-0Print             Maureen Dailey DO  EmergencyMedicine Attending    (Please note that portions of this note were completed with a voice recognition program.  Efforts were made to edit the dictations but occasionally words are mis-transcribed.)       Maureen Dailey DO  06/03/22 2079

## 2022-06-04 ENCOUNTER — HOSPITAL ENCOUNTER (EMERGENCY)
Age: 32
Discharge: HOME | End: 2022-06-04
Attending: STUDENT IN AN ORGANIZED HEALTH CARE EDUCATION/TRAINING PROGRAM
Payer: COMMERCIAL

## 2022-06-04 VITALS
SYSTOLIC BLOOD PRESSURE: 120 MMHG | BODY MASS INDEX: 28.88 KG/M2 | OXYGEN SATURATION: 100 % | RESPIRATION RATE: 15 BRPM | WEIGHT: 184 LBS | DIASTOLIC BLOOD PRESSURE: 71 MMHG | TEMPERATURE: 97.8 F | HEIGHT: 67 IN | HEART RATE: 69 BPM

## 2022-06-04 DIAGNOSIS — Z76.89 ENCOUNTER FOR PSYCHIATRIC ASSESSMENT: Primary | ICD-10-CM

## 2022-06-04 PROCEDURE — 99285 EMERGENCY DEPT VISIT HI MDM: CPT

## 2022-06-04 ASSESSMENT — ENCOUNTER SYMPTOMS
EYE PAIN: 0
BACK PAIN: 0
SHORTNESS OF BREATH: 0
ABDOMINAL PAIN: 0
VOMITING: 0
NAUSEA: 0
SORE THROAT: 0
FACIAL SWELLING: 0
EYE REDNESS: 0
RHINORRHEA: 0
DIARRHEA: 0
COLOR CHANGE: 0
COUGH: 0

## 2022-06-04 ASSESSMENT — PAIN - FUNCTIONAL ASSESSMENT: PAIN_FUNCTIONAL_ASSESSMENT: NONE - DENIES PAIN

## 2022-06-04 ASSESSMENT — PATIENT HEALTH QUESTIONNAIRE - PHQ9: SUM OF ALL RESPONSES TO PHQ QUESTIONS 1-9: 27

## 2022-06-04 ASSESSMENT — LIFESTYLE VARIABLES: HOW OFTEN DO YOU HAVE A DRINK CONTAINING ALCOHOL: NEVER

## 2022-06-04 NOTE — ED PROVIDER NOTES
blood forming organ, Eczema, Fracture, metacarpal, Gastric ulcer, Gastritis, GERD (gastroesophageal reflux disease), GI bleed, H. pylori infection, H/O blood clots, Head injury, Headache, Insomnia, Juvenile rheumatoid arthritis ([facility] [address] Neuromuscular disorder (HCC), PFAPA syndrome ([facility] [address] PUD (peptic ulcer disease), Rheumatoid arthritis ([facility] [address] Rheumatoid arthritis(714.0), Severe recurrent major depression without psychotic features ([facility] [address] Sleep apnea, 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[facility] [address] Substance abuse ([facility] [address] Suicidal ideation, Suicide attempt by hanging ([facility] [address] Tobacco dependence, Ulcerative colitis ([facility] [address] and UTI (urinary tract infection). Surgical History:  has a past surgical history that includes Colonoscopy; bronchoscopy; other surgical history; Upper gastrointestinal endoscopy (2/4/16); pr egd transoral biopsy single/multiple (N/A, 3/20/2017); sigmoidoscopy (N/A, 3/20/2017); Cholecystectomy, laparoscopic (07/14/2017); pr esophagogastroduodenoscopy transoral diagnostic (N/A, 8/9/2017); pr colonoscopy w/biopsy single/multiple (8/9/2017); Abdomen surgery; Upper gastrointestinal endoscopy (N/A, 6/13/2018); Upper gastrointestinal endoscopy (N/A, 9/20/2018); and Endoscopy, colon, diagnostic. Social History:  reports that he has been smoking cigarettes. He has a 7.50 pack-year smoking history. He has never used smokeless tobacco. He reports previous alcohol use. He reports current drug use. Drugs: Opiates  and Cocaine. Family History: Noncontributory at this time  Psychiatric History: See PMH  Allergies:is allergic to dicyclomine, famotidine, geodon [ziprasidone hcl], haloperidol, iv dye [iodides], reglan [metoclopramide], ibuprofen, aspirin, dicyclomine hcl, hydroxyzine hcl, iodine, metronidazole, mirtazapine, promethazine, and ziprasidone.       PHYSICAL EXAM     INITIAL VITALS: BP: 123/70  Heart Rate: 85  Resp: 16  Temp: 98.2 °F (36.8 °C) SpO2: 96 %     Constitutional:  Well developed, no acute distress   Eyes:  Pupils equal and readily reactive to light  HENT:  Atraumatic, external ears normal, nose normal, oropharynx moist. Neck- supple    Respiratory:  Clear to auscultation bilaterally with good air exchange  Cardiovascular:  RRR with normal S1 and S2  GI:  Soft, nondistended and nontender   Musculoskeletal:  No edema, no tenderness, no deformities  Integument:  No rash  Neurologic:  Alert & oriented x 4, no focal deficits noted   Psychiatric: Normal mood and affect      EMERGENCY DEPARTMENT COURSE     40-year-old male presents for psychiatric evaluation requesting to be admitted to the hospital.  Brought here by the [facility]. He is afebrile, nontoxic, normal vital signs. No acute distress. He has no medical complaints today. Clinically I think patient is at his baseline. I do not think he would benefit from hospital admission. I think he is here for at least some sort of secondary gain. He was just discharged 3 days ago. I suspect that he is homeless and using the hospital as a place to stay.  and I really tried to reiterate to the patient that he needs to be compliant with his outpatient follow-up for medication adjustments, counseling. Also recommended that he try to establish permanent housing through the [facility]. Will discharge home today. I do not think patient will be a threat to himself or anybody else. FINAL IMPRESSION     1. Malingering          DISPOSITION:  DISPOSITION Discharge - Pending Orders Complete 06/03/2022 09:06:53 PM        PATIENT REFERRED TO:  No follow-up provider specified. DISCHARGE MEDICATIONS:  New Prescriptions    No medications on file       The care is provided during an unprecedented national emergency due to the novel coronavirus, COVID-19. (Please note that portions of this note were completed with a voice recognition program. Efforts were made to edit the dictations but occasionally words are mis-transcribed.  Whenever words are used in this note in any gender, they shall be construed as though they were used in the gender appropriate to the circumstances; and whenever words are used in this note in the singular or plural form, they shall be construed as though they were used in the form appropriate to the circumstances.)    Cole Rene DO  Attending Emergency Medicine Physician        Cole Rene DO  06/03/22 9310
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
## 2022-06-04 NOTE — ED TRIAGE NOTES
Pt states he is here for suicidal thoughts. Pt has been going from hospital to hospital stating that he is suicidal to obtain an admission. Pt is currently wearing paper scrubs and hospital socks.

## 2022-06-04 NOTE — ED NOTES
This writer phoned [facility] to inquire about dropping this patient off. They stated that this patient was \"standing outside their building and just asked for a ride to [employer]". Per [facility] staff he did not identify any specific reason other than wanting a ride. Per  they permitted giving this patient a ride here without inquiring as to why he needed transport or if there were any other medical complaints.

## 2022-06-04 NOTE — ED NOTES
Mode of arrival (squad #, walk in, police, etc) : Walked into triage      Chief complaint(s): Suicidal        Arrival Note (brief scenario, treatment PTA, etc). : Complaining of feeling suicidal and has plan to drink poison. Denies any drug or alcohol problem. A/O X 3        C= \"Have you ever felt that you should Cut down on your drinking? \"  No  A= \"Have people Annoyed you by criticizing your drinking? \"  No  G= \"Have you ever felt bad or Guilty about your drinking? \"  No  E= \"Have you ever had a drink as an Eye-opener first thing in the morning to steady your nerves or to help a hangover? \"  No      Deferred []      Reason for deferring: N/A    *If yes to two or more: probable alcohol abuse. Jonathon Maradiaga RN  06/03/22 2056

## 2022-06-05 ENCOUNTER — HOSPITAL ENCOUNTER (EMERGENCY)
Age: 32
Discharge: HOME | End: 2022-06-05
Attending: EMERGENCY MEDICINE
Payer: COMMERCIAL

## 2022-06-05 VITALS
HEIGHT: 67 IN | DIASTOLIC BLOOD PRESSURE: 77 MMHG | TEMPERATURE: 98.1 F | HEART RATE: 66 BPM | BODY MASS INDEX: 28.88 KG/M2 | RESPIRATION RATE: 16 BRPM | OXYGEN SATURATION: 98 % | SYSTOLIC BLOOD PRESSURE: 123 MMHG | WEIGHT: 184 LBS

## 2022-06-05 DIAGNOSIS — Z76.5 MALINGERING: Primary | ICD-10-CM

## 2022-06-05 PROCEDURE — 99282 EMERGENCY DEPT VISIT SF MDM: CPT

## 2022-06-05 ASSESSMENT — PAIN - FUNCTIONAL ASSESSMENT: PAIN_FUNCTIONAL_ASSESSMENT: 0-10

## 2022-06-05 NOTE — ED NOTES
Patient presented to this ED again today with same complaint as yesterday. This is patient's second visit to hospital today and was seen in 3 different ED's yesterday. Patient was also seen twice in ED on 6/1/22 after being discharged from [facility] on 5/31/22. Patient has poor follow through with outpatient care. Patient reports he \"lost his medications\" the day after he was discharged. Patient has history of substance abuse. Patient is homeless. Patient becomes verbally aggressive when he is not provided with the response or information he desires. Patient engages in manipulative behaviors. Patient was seen by ED physician and was informed he will not be admitted to [facility]. Patient becomes argumentative with SW stating \"will you just do your job\" and asks the doctor to \"please explain what her job is\". Patient continuously interrupts day SW and night SW during report stating \"do your fucking job and call the doctor like I told you to\". Patient then stated \"just let me leave so I can try and go somewhere else\". Patient spoke with ED physician, denied SI and requested to be discharged.

## 2022-06-05 NOTE — ED NOTES
Dr. Cleveland Medina at bedside, patient physical exam completed and recent ED visit records and diagnostics reviewed with patient, who presents vague and unclear, but alert. Pt feels like \"I have something in my blood that is making me sick\" and \"my skin is crawling like something is trying to get out. \" Dr. Cleveland Medina reassured patient his physical exam, vitals, and very recent blood work were all ok. Pt seemed dejected and was quietly disagreeable, but notably clenching both fists when this information was communicated.  Dr. Cleveland Medina assured patient that patient was clinically fine and should follow up with his psychiatrist. Patient was noted to be wearing paper scrubs not from our facility and had 3 prior ED visit wrist band IDs on right wrist.       Abhi Castañeda, FLORY  99/84/43 [id number]       Abhi Castañeda RN  06/69/06 1126

## 2022-06-05 NOTE — ED PROVIDER NOTES
[address] [place] [address] ED  EMERGENCY DEPARTMENT ENCOUNTER      Pt Name: Arabella Manuel  MRN: [id number]  [place] 1990  Date of evaluation: 6/5/2022  Provider: Ashlyn Tello MD    CHIEF COMPLAINT       Chief Complaint   Patient presents with    Labs Only     wants blood drawn to check for \"any and everything\" for \"some anttidotes\"         HISTORY OF PRESENT ILLNESS  (Location/Symptom, Timing/Onset, Context/Setting, Quality, Duration, Modifying Factors, Severity.)   Arabella Manuel is a 32 y.o. male who presents to the emergency department vague reasons. He thinks that there is something in the blood. He has not had a fever or any specific complaints such as headache cough chest pain or shortness of breath. No vomiting or abdominal pain. He has had numerous emergency department visits over the past month. Nursing Notes were reviewed. ALLERGIES     Dicyclomine, Famotidine, Geodon [ziprasidone hcl], Haloperidol, Iv dye [iodides], Reglan [metoclopramide], Ibuprofen, Aspirin, Dicyclomine hcl, Hydroxyzine hcl, Iodine, Metronidazole, Mirtazapine, Promethazine, and Ziprasidone    CURRENT MEDICATIONS       Previous Medications    ACETAMINOPHEN (TYLENOL) 500 MG TABLET    Take 2 tablets by mouth every 6 hours as needed for Pain or Fever    BUPRENORPHINE-NALOXONE (SUBOXONE) 2-0.5 MG FILM SL FILM    Place 1 Film under the tongue 2 times daily for 5 days.     LORATADINE (CLARITIN) 10 MG TABLET    Take 1 tablet by mouth daily    VENLAFAXINE (EFFEXOR XR) 37.5 MG EXTENDED RELEASE CAPSULE    Take 1 capsule by mouth daily (with breakfast)       PAST MEDICAL HISTORY         Diagnosis Date    Anxiety     Arthritis     Asthma     Bipolar I disorder, most recent episode (or current) depressed, unspecified 9/12/2014    Clostridium difficile infection     COPD (chronic obstructive pulmonary disease) ([facility] [address]     Depression     Disease of blood and blood forming organ     Eczema     Fracture, metacarpal     R 4th and 5th Depression Father     Arthritis Father     High Blood Pressure Father     Other Father         aneurysm & epilepsy    Migraines Father     Arthritis Mother     Other Mother         aneurysm & epilepsy    Migraines Mother     Diabetes Brother         Aunt and uncles    Depression Brother     Mental Illness Brother     Other Brother         epilepsy    Migraines Brother     Stroke Other         Uncle    Other Brother         murdered Oct 6th, 2014    Colon Cancer Paternal Cousin 43    Other Sister         epilepsy    Migraines Sister      Family Status   Relation Name Status    Father  Alive    Mother  Alive    Brother  (Not Specified)    Other  (Not Specified)   Brett Nathan Brother  (Not Specified)   Falguni Gamble  (Not Specified)    Sister  (Not Specified)        SOCIAL HISTORY      reports that he has been smoking cigarettes. He has a 7.50 pack-year smoking history. He has never used smokeless tobacco. He reports previous alcohol use. He reports current drug use. Drugs: Opiates  and Cocaine. REVIEW OF SYSTEMS    (2-9 systems for level 4, 10 or more for level 5)     Review of Systems   Unable to perform ROS: Psychiatric disorder        Except as noted above the remainder of the review of systems was reviewed and negative. PHYSICAL EXAM    (up to 7 for level 4, 8 or more for level 5)     Vitals:    06/05/22 1820   BP: 123/77   Pulse: 66   Resp: 16   Temp: 98.1 °F (36.7 °C)   TempSrc: Oral   SpO2: 98%   Weight: 184 lb (83.5 kg)   Height: 5' 7\" (1.702 m)       Physical Exam  Vitals reviewed. Constitutional:       General: He is not in acute distress. Appearance: He is well-developed. He is not diaphoretic. HENT:      Head: Normocephalic and atraumatic. Eyes:      General: No scleral icterus. Right eye: No discharge. Left eye: No discharge. Cardiovascular:      Rate and Rhythm: Normal rate and regular rhythm.    Pulmonary:      Effort: Pulmonary effort is normal. No respiratory distress. Breath sounds: Normal breath sounds. No stridor. No wheezing or rales. Abdominal:      General: There is no distension. Palpations: Abdomen is soft. Tenderness: There is no abdominal tenderness. Musculoskeletal:         General: Normal range of motion. Cervical back: Neck supple. Lymphadenopathy:      Cervical: No cervical adenopathy. Skin:     General: Skin is warm and dry. Findings: No erythema or rash. Neurological:      Mental Status: He is alert. Comments: Awake and alert   Psychiatric:      Comments: At 1 point he was clenching both of his fists. DIAGNOSTIC RESULTS     EKG: All EKG's are interpreted by the Emergency Department Physician who either signs or Co-signs this chart in the absence of a cardiologist.    Not indicated    RADIOLOGY:   Non-plain film images such as CT, Ultrasound and MRI are read by the radiologist. Plain radiographic images are visualized and preliminarily interpreted by the emergency physician with the below findings:    Not indicated    Interpretation per the Radiologist below, if available at the time of this note:        LABS:  Labs Reviewed - No data to display    All other labs were within normal range or not returned as of this dictation. EMERGENCY DEPARTMENT COURSE and DIFFERENTIAL DIAGNOSIS/MDM:   Vitals:    Vitals:    06/05/22 1820   BP: 123/77   Pulse: 66   Resp: 16   Temp: 98.1 °F (36.7 °C)   TempSrc: Oral   SpO2: 98%   Weight: 184 lb (83.5 kg)   Height: 5' 7\" (1.702 m)       No orders of the defined types were placed in this encounter. Medical Decision Making: There is no indication for work-up in this patient. There is no indication for blood work. He is not suicidal.  He was reassured and asked to follow-up with his psychiatrist to get back on his medications. CONSULTS:  None    PROCEDURES:  None    FINAL IMPRESSION      1.  Malingering          DISPOSITION/PLAN   DISPOSITION Decision To Discharge 06/05/2022 06:35:26 PM      PATIENT REFERRED TO:   [facility] ED  [address] [facility]  [phone]    If symptoms worsen      DISCHARGE MEDICATIONS:     New Prescriptions    No medications on file       The care is provided during an unprecedented national emergency due to the novel coronavirus, COVID-19.     (Please note that portions of this note were completed with a voice recognition program.  Efforts were made to edit the dictations but occasionally words are mis-transcribed.)    Jacques Lara MD  Attending Emergency Physician           Jacques Lara MD  06/05/22 7990

## 2022-06-05 NOTE — ED PROVIDER NOTES
EMERGENCY DEPARTMENT ENCOUNTER    Pt Name: Emily Zaragoza  MRN: [id number]  [place] 1990  Date of evaluation: 6/4/22  CHIEF COMPLAINT       Chief Complaint   Patient presents with    Suicidal     HISTORY OF PRESENT ILLNESS   HPI  70-year-old male history of bipolar, depression presenting for evaluation requesting psychiatric evaluation. Patient initially came in reporting some passive suicidal ideation with no plan. No physical symptoms. Patient has been seen in the ER multiple times over the past 24 hours for similar symptoms and ends up leaving. No actual attempted self-harm. Patient states he had previously followed in [facility] and had been getting long-acting Abilify injections but not had these for several months. He feels like he would do better with these. He was admitted to the [facility] about a week ago and discharged with prescriptions but reports he lost these. No other associated symptoms. REVIEW OF SYSTEMS     Review of Systems   Constitutional: Negative for chills and fatigue. HENT: Negative for facial swelling, nosebleeds, rhinorrhea and sore throat. Eyes: Negative for pain and redness. Respiratory: Negative for cough and shortness of breath. Cardiovascular: Negative for chest pain and leg swelling. Gastrointestinal: Negative for abdominal pain, diarrhea, nausea and vomiting. Genitourinary: Negative for flank pain and hematuria. Musculoskeletal: Negative for arthralgias and back pain. Skin: Negative for color change and rash. Neurological: Negative for dizziness, tremors, facial asymmetry, speech difficulty, weakness and numbness. Psychiatric/Behavioral: Positive for behavioral problems and suicidal ideas. Negative for self-injury.      PASTMEDICAL HISTORY     Past Medical History:   Diagnosis Date    Anxiety     Arthritis     Asthma     Bipolar I disorder, most recent episode (or current) depressed, unspecified 9/12/2014    Clostridium difficile infection     COPD (chronic obstructive [facility] disease) [facility]     Depression     Disease of blood and blood forming organ     Eczema     Fracture, metacarpal     R 4th and 5th    Gastric ulcer     Gastritis 06/13/2018    GERD (gastroesophageal reflux disease)     GI bleed     H. 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[facility]     Substance abuse ([facility]     Hx of opiates, benzos, cocaine, alcohol abuse    Suicidal ideation     Suicide attempt by hanging ([facility]     Tobacco dependence     Ulcerative colitis ([facility]     UTI (urinary tract infection)      Past Problem List  Patient Active Problem List   Diagnosis Code    Opioid abuse ([facility] F11.10    Noncompliance Z91.19    Rectal bleeding K62.5    Smoker F17.200    Juvenile rheumatoid arthritis ([facility] M08.00    SRIRAM (obstructive sleep apnea) G47.33    Primary insomnia F51.01    Calculus of bile duct with acute on chronic cholecystitis K80.46    Mild intermittent asthma without complication Y95.67    Gastritis K29.70    Generalized abdominal pain R10.84    Anemia D64.9    Elevated liver enzymes R74.8    Nausea and vomiting R11.2    Opioid type dependence, continuous ([facility]) F11.20    Abdominal pain R10.9    Diarrhea R19.7    Irritable bowel syndrome with both constipation and diarrhea K58.2    Schizoaffective disorder, bipolar type ([address] F25.0    GI bleed K92.2    Depression with suicidal ideation F32. A, R45.851    Schizoaffective disorder ([facility] F25.9    MDD (major depressive disorder), recurrent severe, without psychosis ([facility] F33.2    Severe episode of recurrent major depressive disorder, without [metoclopramide], ibuprofen, aspirin, dicyclomine hcl, hydroxyzine hcl, iodine, metronidazole, mirtazapine, promethazine, and ziprasidone. FAMILY HISTORY     He indicated that his mother is alive. He indicated that his father is alive. He indicated that the status of his sister is unknown. He indicated that the status of his other is unknown. He indicated that the status of his paternal cousin is unknown. SOCIAL HISTORY       Social History     Tobacco Use    Smoking status: Current Every Day Smoker     Packs/day: 0.50     Years: 15.00     Pack years: 7.50     Types: Cigarettes    Smokeless tobacco: Never Used   Vaping Use    Vaping Use: Former   Substance Use Topics    Alcohol use: Not Currently     Comment: drinks daily    Drug use: Yes     Types: Opiates , Cocaine     Comment: Hx of opiates, benzos, cocaine, alcohol abuse     PHYSICAL EXAM     INITIAL VITALS: /71   Pulse 69   Temp 97.8 °F (36.6 °C) (Tympanic)   Resp 15   Ht 5' 7\" (1.702 m)   Wt 184 lb (83.5 kg)   SpO2 100%   BMI 28.82 kg/m²    Physical Exam  Constitutional:       Appearance: Normal appearance. HENT:      Head: Normocephalic and atraumatic. Nose: Nose normal.   Eyes:      Extraocular Movements: Extraocular movements intact. Pupils: Pupils are equal, round, and reactive to light. Cardiovascular:      Rate and Rhythm: Normal rate and regular rhythm. Pulmonary:      Effort: Pulmonary effort is normal. No respiratory distress. Breath sounds: Normal breath sounds. Abdominal:      General: Abdomen is flat. There is no distension. Palpations: Abdomen is soft. There is no mass. Musculoskeletal:         General: No swelling. Normal range of motion. Cervical back: Normal range of motion. No rigidity. Skin:     General: Skin is warm and dry. Neurological:      General: No focal deficit present. Mental Status: He is alert. Mental status is at baseline.       Cranial Nerves: No cranial nerve deficit. MEDICAL DECISION MAKIN-year-old male presented for evaluation initially reporting suicidal ideation requesting admission to the [facility]. I discussed his symptoms with and we initially working a call up to the [facility] to see if he would benefit from admission to restart long-acting Abilify injections but before we could do this the patient says he was no longer suicidal and was requesting discharge. He is denying auditory and visual hallucinations. He is denying any's, suicidal plan. Do not think he is an imminent risk to harm himself or anyone else and so we will discharge per his request.         CRITICAL CARE:       PROCEDURES:    Procedures    DIAGNOSTIC RESULTS   EKG:All EKG's are interpreted by the Emergency Department Physician who either signs or Co-signs this chart in the absence of a cardiologist.        RADIOLOGY:All plain film, CT, MRI, and formal ultrasound images (except ED bedside ultrasound) are read by the radiologist, see reports below, unless otherwisenoted in MDM or here. No orders to display     LABS: All lab results were reviewed by myself, and all abnormals are listed below. Labs Reviewed - No data to display    EMERGENCY DEPARTMENTCOURSE:         Vitals:    Vitals:    22 1838   BP: 120/71   Pulse: 69   Resp: 15   Temp: 97.8 °F (36.6 °C)   TempSrc: Tympanic   SpO2: 100%   Weight: 184 lb (83.5 kg)   Height: 5' 7\" (1.702 m)       The patient was given the following medications while in the emergency department:  No orders of the defined types were placed in this encounter. CONSULTS:  None    FINAL IMPRESSION      1.  Encounter for psychiatric assessment          DISPOSITION/PLAN   DISPOSITION Decision To Discharge 2022 07:47:04 PM      PATIENT REFERRED TO:  [facility]  [address]  [address] [facility] [address]  [phone]  Call   As needed    DISCHARGE MEDICATIONS:  New Prescriptions    No medications on file     The care is provided during an unprecedented national emergency due to the novel coronavirus, COVID 19.   MD Carlos Lawton MD  06/04/22 8404

## 2022-06-05 NOTE — ED NOTES
Pt discharge explained, pt again reassured of health status per todays physical exam and recent lab studies and urged to contact psychiatrist office tomorrow and get back on appropriate medications as patient stated earlier that he was out of them and has been off of them. Pt asked for some food, saltines in particular, \"because they have salt on them,\" which writer provided along with bag. Security was standing nearby to assure patient discharged calmly and safely which he did.       Edith Heck., RN  35/80/23 1970

## 2022-06-06 ENCOUNTER — HOSPITAL ENCOUNTER (EMERGENCY)
Age: 32
Discharge: HOME | End: 2022-06-06
Attending: EMERGENCY MEDICINE
Payer: COMMERCIAL

## 2022-06-06 ENCOUNTER — HOSPITAL ENCOUNTER (EMERGENCY)
Age: 32
Discharge: HOME | End: 2022-06-06
Attending: STUDENT IN AN ORGANIZED HEALTH CARE EDUCATION/TRAINING PROGRAM
Payer: COMMERCIAL

## 2022-06-06 VITALS
HEIGHT: 67 IN | WEIGHT: 184 LBS | RESPIRATION RATE: 20 BRPM | OXYGEN SATURATION: 98 % | HEART RATE: 80 BPM | SYSTOLIC BLOOD PRESSURE: 115 MMHG | DIASTOLIC BLOOD PRESSURE: 86 MMHG | BODY MASS INDEX: 28.88 KG/M2

## 2022-06-06 VITALS
SYSTOLIC BLOOD PRESSURE: 104 MMHG | OXYGEN SATURATION: 97 % | DIASTOLIC BLOOD PRESSURE: 60 MMHG | HEIGHT: 66 IN | RESPIRATION RATE: 12 BRPM | BODY MASS INDEX: 29.57 KG/M2 | HEART RATE: 67 BPM | TEMPERATURE: 97.5 F | WEIGHT: 184 LBS

## 2022-06-06 DIAGNOSIS — Z76.5 MALINGERING: ICD-10-CM

## 2022-06-06 DIAGNOSIS — Z91.14 NONCOMPLIANCE WITH MEDICATION REGIMEN: ICD-10-CM

## 2022-06-06 DIAGNOSIS — F32.A DEPRESSION, UNSPECIFIED DEPRESSION TYPE: Primary | ICD-10-CM

## 2022-06-06 DIAGNOSIS — L29.9 ITCHING: Primary | ICD-10-CM

## 2022-06-06 PROCEDURE — 96372 THER/PROPH/DIAG INJ SC/IM: CPT

## 2022-06-06 PROCEDURE — 6370000000 HC RX 637 (ALT 250 FOR IP): Performed by: EMERGENCY MEDICINE

## 2022-06-06 PROCEDURE — 99282 EMERGENCY DEPT VISIT SF MDM: CPT

## 2022-06-06 PROCEDURE — 6360000002 HC RX W HCPCS: Performed by: EMERGENCY MEDICINE

## 2022-06-06 PROCEDURE — 99284 EMERGENCY DEPT VISIT MOD MDM: CPT

## 2022-06-06 RX ORDER — ALPRAZOLAM 0.25 MG/1
0.25 TABLET ORAL ONCE
Status: COMPLETED | OUTPATIENT
Start: 2022-06-06 | End: 2022-06-06

## 2022-06-06 RX ORDER — DIPHENHYDRAMINE HYDROCHLORIDE 50 MG/ML
10 INJECTION INTRAMUSCULAR; INTRAVENOUS ONCE
Status: COMPLETED | OUTPATIENT
Start: 2022-06-06 | End: 2022-06-06

## 2022-06-06 RX ORDER — DIPHENHYDRAMINE HCL 25 MG
25 CAPSULE ORAL EVERY 8 HOURS PRN
Qty: 10 CAPSULE | Refills: 0 | Status: SHIPPED | OUTPATIENT
Start: 2022-06-06 | End: 2022-06-11

## 2022-06-06 RX ORDER — ALPRAZOLAM 0.25 MG/1
0.25 TABLET ORAL NIGHTLY PRN
Status: DISCONTINUED | OUTPATIENT
Start: 2022-06-07 | End: 2022-06-06

## 2022-06-06 RX ADMIN — ALPRAZOLAM 0.25 MG: 0.25 TABLET ORAL at 21:25

## 2022-06-06 RX ADMIN — DIPHENHYDRAMINE HYDROCHLORIDE 10 MG: 50 INJECTION, SOLUTION INTRAMUSCULAR; INTRAVENOUS at 21:24

## 2022-06-06 ASSESSMENT — ENCOUNTER SYMPTOMS
EYE REDNESS: 0
COLOR CHANGE: 0
SHORTNESS OF BREATH: 0
EYE DISCHARGE: 0
ABDOMINAL PAIN: 0

## 2022-06-06 ASSESSMENT — PAIN - FUNCTIONAL ASSESSMENT: PAIN_FUNCTIONAL_ASSESSMENT: NONE - DENIES PAIN

## 2022-06-06 NOTE — ED NOTES
Discharge and cab to [facility] around 8am  [address] Wilda Ferraro DO  06/06/22 8196

## 2022-06-06 NOTE — ED PROVIDER NOTES
[facility]  Emergency Department Encounter     Pt Name: Andres Alejandre  MRN: [id number]  [place] 1990  Date of evaluation: 6/6/22  PCP:  No primary care provider on file. CHIEF COMPLAINT       Chief Complaint   Patient presents with    Suicidal       [facility]  (Location/Symptom, Timing/Onset, Context/Setting, Quality, Duration, Modifying Factors,Severity.)      Andres Alejandre is a 32 y.o. male who presents with reported suicidal thoughts. Patient is well-known to the emergency department. Has almost daily visits if not multiple visits daily to area ERs. Was actually seen here last evening and was discharged. Reportedly went to [address] [place] [address]. [facility] was discharged there presenting to our ER again this morning with no specific suicidal plan or attempt. States he just feels very tired and has been feeling sad because he is so tired. Feeling like he wants to get help before he does anything to harm himself. He denies any alcohol or substance use. States he has not been compliant with any of his psychiatric meds.     PAST MEDICAL / SURGICAL / SOCIAL / FAMILY HISTORY      has a past medical history of Anxiety, Arthritis, Asthma, Bipolar I disorder, most recent episode (or current) depressed, unspecified, Clostridium difficile infection, COPD (chronic obstructive pulmonary disease) ([address] Depression, Disease of blood and blood forming organ, Eczema, Fracture, metacarpal, Gastric ulcer, Gastritis, GERD (gastroesophageal reflux disease), GI bleed, H. pylori infection, H/O blood clots, Head injury, Headache, Insomnia, Juvenile rheumatoid arthritis ([address] Neuromuscular disorder ([address] PFAPA syndrome ([address] PUD (peptic ulcer disease), Rheumatoid arthritis ([address] Rheumatoid arthritis(714.0), Severe recurrent major depression without psychotic features ([address] Sleep apnea, 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[address] Substance abuse ([address] Suicidal ideation, Suicide attempt by hanging ([address] Tobacco dependence, Ulcerative colitis ([facility] and UTI (urinary tract infection). has a past surgical history that includes Colonoscopy; bronchoscopy; other surgical history; Upper gastrointestinal endoscopy (2/4/16); pr egd transoral biopsy single/multiple (N/A, 3/20/2017); sigmoidoscopy (N/A, 3/20/2017); Cholecystectomy, laparoscopic (07/14/2017); pr esophagogastroduodenoscopy transoral diagnostic (N/A, 8/9/2017); pr colonoscopy w/biopsy single/multiple (8/9/2017); Abdomen surgery; Upper gastrointestinal endoscopy (N/A, 6/13/2018); Upper gastrointestinal endoscopy (N/A, 9/20/2018); and Endoscopy, colon, diagnostic. Social History     Socioeconomic History    Marital status: Single     Spouse name: Not on file    Number of children: Not on file    Years of education: Not on file    Highest education level: Not on file   Occupational History    Occupation: disability   Tobacco Use    Smoking status: Current Every Day Smoker     Packs/day: 0.50     Years: 15.00     Pack years: 7.50     Types: Cigarettes    Smokeless tobacco: Never Used   Vaping Use    Vaping Use: Former   Substance and Sexual Activity    Alcohol use: Not Currently     Comment: drinks daily    Drug use: Yes     Types: Opiates , Cocaine     Comment: Hx of opiates, benzos, cocaine, alcohol abuse    Sexual activity: Yes     Partners: Female     Comment: Lives alone, not working. Other Topics Concern    Not on file   Social History Narrative    ** Merged History Encounter **          Social Determinants of Health     Financial Resource Strain: Medium Risk    Difficulty of Paying Living Expenses: Somewhat hard   Food Insecurity: No Food Insecurity    Worried About Running Out of Food in the Last Year: Never true    [place] of Food in the Last Year: Never true   Transportation Needs: No Transportation Needs    Lack of Transportation (Medical): No    Lack of Transportation (Non-Medical):  No   Physical Activity: Inactive    Days of Exercise per Week: 0 days    Minutes of Exercise per Session: 0 min   Stress: Stress Concern Present    Feeling of Stress : Rather much   Social Connections: Socially Isolated    Frequency of Communication with Friends and Family: Never    Frequency of Social Gatherings with Friends and Family: Never    Attends Mormonism Services: Never    Active Member of Clubs or Organizations: No    Attends Club or Organization Meetings: Never    Marital Status: Never    Intimate Partner Violence: Not At Risk    Fear of Current or Ex-Partner: No    Emotionally Abused: No    Physically Abused: No    Sexually Abused: No   Housing Stability: [address] Unable to Pay for Housing in the Last Year: No    Number of [place] in the Last Year: 1    Unstable Housing in the Last Year: No       Family History   Problem Relation Age of Onset    Diabetes Father     Alcohol Abuse Father     Depression Father     Arthritis Father     High Blood Pressure Father     Other Father         aneurysm & epilepsy    Migraines Father     Arthritis Mother     Other Mother         aneurysm & epilepsy    Migraines Mother     Diabetes Brother         Aunt and uncles    Depression Brother     Mental Illness Brother     Other Brother         epilepsy    Migraines Brother     Stroke Other         Uncle    Other Brother         murdered Oct 6th, 2014    Colon Cancer Paternal Cousin 43    Other Sister         epilepsy   Danielle Migraines Sister        Allergies:  Dicyclomine, Famotidine, Geodon [ziprasidone hcl], Haloperidol, Iv dye [iodides], Reglan [metoclopramide], Ibuprofen, Aspirin, Dicyclomine hcl, Hydroxyzine hcl, Iodine, Metronidazole, Mirtazapine, Promethazine, and Ziprasidone    Home Medications:  Prior to Admission medications    Medication Sig Start Date End Date Taking?  Authorizing Provider   acetaminophen (TYLENOL) 500 MG tablet Take 2 tablets by mouth every 6 hours as needed for Pain or Fever 6/3/22   Wynne Hammans Cullen,    loratadine (CLARITIN) 10 MG tablet Take 1 tablet by mouth daily 6/3/22   Nicholas Soto DO   venlafaxine (EFFEXOR XR) 37.5 MG extended release capsule Take 1 capsule by mouth daily (with breakfast) 5/31/22   Ashleigh Manning MD   meloxicam (MOBIC) 7.5 MG tablet Take 1 tablet by mouth 2 times daily as needed for Pain 8/19/21 10/30/21  Cecille Duverney, APRN - CNP       REVIEW OF SYSTEMS    (2-9 systems for level 4, 10 or more for level 5)      Review of Systems   Constitutional: Negative for chills and fever. Eyes: Negative for discharge and redness. Respiratory: Negative for shortness of breath. Cardiovascular: Negative for chest pain. Gastrointestinal: Negative for abdominal pain. Genitourinary: Negative for dysuria. Musculoskeletal: Negative for arthralgias. Skin: Negative for color change and rash. Neurological: Negative for headaches. Psychiatric/Behavioral: Negative for agitation and confusion. PHYSICAL EXAM   (up to 7 for level 4, 8 or more for level 5)     INITIAL VITALS:    height is 5' 6\" (1.676 m) and weight is 184 lb (83.5 kg). His oral temperature is 97.5 °F (36.4 °C). His blood pressure is 104/60 and his pulse is 67. His respiration is 12 and oxygen saturation is 97%. Physical Exam  Vitals and nursing note reviewed. Constitutional:       Appearance: He is well-developed. HENT:      Head: Normocephalic and atraumatic. Nose: Nose normal.      Mouth/Throat:      Mouth: Mucous membranes are moist.   Eyes:      General: No scleral icterus. Conjunctiva/sclera: Conjunctivae normal.      Pupils: Pupils are equal, round, and reactive to light. Cardiovascular:      Rate and Rhythm: Normal rate and regular rhythm. Heart sounds: Normal heart sounds. No murmur heard. No friction rub. No gallop. Pulmonary:      Effort: Pulmonary effort is normal. No respiratory distress. Breath sounds: Normal breath sounds. No wheezing or rales. Musculoskeletal:         General: Normal range of motion. Skin:     General: Skin is warm and dry. Findings: No erythema or rash. Neurological:      Mental Status: He is alert and oriented to person, place, and time. Psychiatric:      Comments: Flat affect however good eye contact, linear thought process, does not appear to be responding to internal stimuli         DIFFERENTIAL  DIAGNOSIS     PLAN (LABS / IMAGING / EKG):  No orders of the defined types were placed in this encounter. MEDICATIONS ORDERED:  No orders of the defined types were placed in this encounter. DDX: Suicidal ideation versus depression versus insomnia versus malingering    Initial MDM/Plan: 32 y.o. male who presents with reported tiredness and feeling sad. He denies any specific suicidal thoughts or plans. The patient extremely low risk for any suicidal attempt or significant harm to self. Was at [address] 1 week prior. Discussed with patient will cab to [facility] walk-in in a few hours when this is opened. Patient agreeable to plan. He does not appear acutely psychotic and immediately asked for food and juice. Feel patient is low risk to self and society. Will observe patient until walk-in clinic at [facility] are open and Patient there. DIAGNOSTIC RESULTS / EMERGENCY DEPARTMENT COURSE / MDM     LABS:  Labs Reviewed - No data to display      RADIOLOGY:  No results found. EMERGENCY DEPARTMENT COURSE:  Patient signed out to oncoming physician awaiting discharge to walk-in clinic. PROCEDURES:  None    CONSULTS:  None    CRITICAL CARE:  0    FINAL IMPRESSION      1. Depression, unspecified depression type    2. Noncompliance with medication regimen    3. Malingering          DISPOSITION / PLAN     DISPOSITION  06/06/2022 05:51:46 AM    Discharge    PATIENTREFERRED TO:  No follow-up provider specified.     DISCHARGE MEDICATIONS:  New Prescriptions    No medications on file       Dru Newton DO  EmergencyMedicine Attending    (Please note that portions of this note were completed with a voice recognition program.  Efforts were made to edit the dictations but occasionally words are mis-transcribed.)       Yakov Kerr DO  06/06/22 5421

## 2022-06-06 NOTE — ED NOTES
Discharge and cab to [facility] around [address] [facility],   06/06/22 8696

## 2022-06-06 NOTE — ED NOTES
Pt resting quietly on ED cart. No signs of distress noted. Chest rise and fall equal with respirations.       Enrique Boo, formerly [facility]  06/06/22 1382

## 2022-06-07 NOTE — ED PROVIDER NOTES
EMERGENCY DEPARTMENT ENCOUNTER    Pt Name: Elvis Calderon  MRN: [id number]  [place] 1990  Date of evaluation: 6/6/22  CHIEF COMPLAINT       Chief Complaint   Patient presents with   Gladystine Cure     wants a benadryl shot     HISTORY OF PRESENT ILLNESS   70-year-old male presents emergency room for complaint of itching. Patient has bipolar disorder and has difficulty giving accurate history but reports he has been itching for a long time. He is here requesting Benadryl. He reports he itches on his back and legs. He is not currently taking anything at home. He is also requesting a dose of alprazolam for anxiety.           REVIEW OF SYSTEMS     Review of Systems   Unable to perform ROS: Psychiatric disorder       PASTMEDICAL HISTORY     Past Medical History:   Diagnosis Date    Anxiety     Arthritis     Asthma     Bipolar I disorder, most recent episode (or current) depressed, unspecified 9/12/2014    Clostridium difficile infection     COPD (chronic obstructive pulmonary disease) ([address]     Depression     Disease of blood and blood forming organ     Eczema     Fracture, metacarpal     R 4th and 5th    Gastric ulcer     Gastritis 06/13/2018    GERD (gastroesophageal reflux disease)     GI bleed     H. 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[address]     Substance abuse ([address]     Hx of opiates, benzos, cocaine, alcohol abuse    Suicidal ideation     Suicide attempt by hanging ([address]     Tobacco dependence     Ulcerative colitis ([address]     UTI (urinary tract infection)      Past Problem List  Patient Active Problem List   Diagnosis Code    performed by Manuela Dixon MD at [address] [place] [address]  2/4/16    UPPER GASTROINTESTINAL ENDOSCOPY N/A 6/13/2018    GASTRITIS    UPPER GASTROINTESTINAL ENDOSCOPY N/A 9/20/2018    EGD BIOPSY performed by Antione Adkins MD at [facility]       Previous Medications    ACETAMINOPHEN (TYLENOL) 500 MG TABLET    Take 2 tablets by mouth every 6 hours as needed for Pain or Fever    LORATADINE (CLARITIN) 10 MG TABLET    Take 1 tablet by mouth daily    VENLAFAXINE (EFFEXOR XR) 37.5 MG EXTENDED RELEASE CAPSULE    Take 1 capsule by mouth daily (with breakfast)     ALLERGIES     is allergic to dicyclomine, famotidine, geodon [ziprasidone hcl], haloperidol, iv dye [iodides], reglan [metoclopramide], ibuprofen, aspirin, dicyclomine hcl, hydroxyzine hcl, iodine, metronidazole, mirtazapine, promethazine, and ziprasidone. FAMILY HISTORY     He indicated that his mother is alive. He indicated that his father is alive. He indicated that the status of his sister is unknown. He indicated that the status of his other is unknown. He indicated that the status of his paternal cousin is unknown. SOCIAL HISTORY       Social History     Tobacco Use    Smoking status: Current Every Day Smoker     Packs/day: 0.50     Years: 15.00     Pack years: 7.50     Types: Cigarettes    Smokeless tobacco: Never Used   Vaping Use    Vaping Use: Former   Substance Use Topics    Alcohol use: Not Currently     Comment: drinks daily    Drug use: Yes     Types: Opiates , Cocaine     Comment: Hx of opiates, benzos, cocaine, alcohol abuse     PHYSICAL EXAM     INITIAL VITALS: /86   Pulse 80   Resp 20   Ht 5' 7\" (1.702 m)   Wt 184 lb (83.5 kg)   SpO2 98%   BMI 28.82 kg/m²    Physical Exam  Constitutional:       General: He is not in acute distress. Appearance: He is well-developed. HENT:      Head: Normocephalic. Eyes:      Pupils: Pupils are equal, round, and reactive to light. Cardiovascular:      Rate and Rhythm: Normal rate and regular rhythm. Heart sounds: Normal heart sounds. Pulmonary:      Effort: Pulmonary effort is normal. No respiratory distress. Breath sounds: Normal breath sounds. Abdominal:      General: Bowel sounds are normal.      Palpations: Abdomen is soft. Tenderness: There is no abdominal tenderness. Musculoskeletal:         General: Normal range of motion. Skin:     General: Skin is warm and dry. Neurological:      Mental Status: He is alert and oriented to person, place, and time. MEDICAL DECISION MAKING:     Nondistressed 49-year-old male presenting to the emergency room for reported pruritus. Patient has small papules to the upper back with scratches. He is reporting itching fairly diffusely throughout the body. Patient given single dose of IM Benadryl here with prescription for home and PCP follow-up instructions. CRITICAL CARE:       PROCEDURES:    Procedures    DIAGNOSTIC RESULTS   EKG:All EKG's are interpreted by the Emergency Department Physician who either signs or Co-signs this chart in the absence of a cardiologist.        RADIOLOGY:All plain film, CT, MRI, and formal ultrasound images (except ED bedside ultrasound) are read by the radiologist, see reports below, unless otherwisenoted in MDM or here. No orders to display     LABS: All lab results were reviewed by myself, and all abnormals are listed below.   Labs Reviewed - No data to display    EMERGENCY DEPARTMENTCOURSE:         Vitals:    Vitals:    06/06/22 1953   BP: 115/86   Pulse: 80   Resp: 20   SpO2: 98%   Weight: 184 lb (83.5 kg)   Height: 5' 7\" (1.702 m)       The patient was given the following medications while in the emergency department:  Orders Placed This Encounter   Medications    diphenhydrAMINE (BENADRYL) injection 10 mg    DISCONTD: ALPRAZolam (XANAX) tablet 0.25 mg    ALPRAZolam (XANAX) tablet 0.25 mg    diphenhydrAMINE (BENADRYL) 25 MG capsule     Sig: Take 1 capsule by mouth every 8 hours as needed for Itching     Dispense:  10 capsule     Refill:  0     CONSULTS:  None    FINAL IMPRESSION      1. Itching          DISPOSITION/PLAN   DISPOSITION Decision To Discharge 06/06/2022 09:08:43 PM      PATIENT REFERRED TO:  No follow-up provider specified. DISCHARGE MEDICATIONS:  New Prescriptions    DIPHENHYDRAMINE (BENADRYL) 25 MG CAPSULE    Take 1 capsule by mouth every 8 hours as needed for Itching     Tam Snow MD  Attending Emergency Physician      Care during this encounter was due to an unprecedented national emergency due to COVID-19.             Miranda Conner MD  06/06/22 9913

## 2022-06-07 NOTE — ED NOTES
No answer when called to be roomed. Will attempt again soon.       Sofía Drummond, FLORY  06/06/22 2025

## 2022-06-07 NOTE — ED NOTES
Pt arrived to ed with c/o anxiety and itching. Pt has psych history. Pt is not currently itching. Will continue to monitor. Pt frequents multiple hospitals. Pt cooperative at this time.       Umesh Paulino RN  06/07/22 1969

## 2022-06-18 ENCOUNTER — HOSPITAL ENCOUNTER (INPATIENT)
Age: 32
LOS: 3 days | Discharge: HOME | DRG: 885 | End: 2022-06-21
Attending: PSYCHIATRY & NEUROLOGY | Admitting: PSYCHIATRY & NEUROLOGY
Payer: COMMERCIAL

## 2022-06-18 DIAGNOSIS — F11.20 OPIOID TYPE DEPENDENCE, CONTINUOUS (HCC): Primary | Chronic | ICD-10-CM

## 2022-06-18 PROBLEM — F23 ACUTE PSYCHOSIS (HCC): Status: ACTIVE | Noted: 2022-06-18

## 2022-06-18 PROCEDURE — 6370000000 HC RX 637 (ALT 250 FOR IP): Performed by: PSYCHIATRY & NEUROLOGY

## 2022-06-18 PROCEDURE — 1240000000 HC EMOTIONAL WELLNESS R&B

## 2022-06-18 RX ORDER — ACETAMINOPHEN 325 MG/1
650 TABLET ORAL EVERY 4 HOURS PRN
Status: DISCONTINUED | OUTPATIENT
Start: 2022-06-18 | End: 2022-06-21 | Stop reason: HOSPADM

## 2022-06-18 RX ORDER — POLYETHYLENE GLYCOL 3350 17 G/17G
17 POWDER, FOR SOLUTION ORAL DAILY PRN
Status: DISCONTINUED | OUTPATIENT
Start: 2022-06-18 | End: 2022-06-21 | Stop reason: HOSPADM

## 2022-06-18 RX ORDER — MAGNESIUM HYDROXIDE/ALUMINUM HYDROXICE/SIMETHICONE 120; 1200; 1200 MG/30ML; MG/30ML; MG/30ML
30 SUSPENSION ORAL EVERY 6 HOURS PRN
Status: DISCONTINUED | OUTPATIENT
Start: 2022-06-18 | End: 2022-06-21 | Stop reason: HOSPADM

## 2022-06-18 RX ORDER — TRAZODONE HYDROCHLORIDE 50 MG/1
50 TABLET ORAL NIGHTLY PRN
Status: DISCONTINUED | OUTPATIENT
Start: 2022-06-18 | End: 2022-06-21 | Stop reason: HOSPADM

## 2022-06-18 RX ORDER — CHLORPROMAZINE HYDROCHLORIDE 25 MG/ML
50 INJECTION INTRAMUSCULAR EVERY 4 HOURS PRN
Status: DISCONTINUED | OUTPATIENT
Start: 2022-06-18 | End: 2022-06-19

## 2022-06-18 RX ADMIN — TRAZODONE HYDROCHLORIDE 50 MG: 50 TABLET ORAL at 22:48

## 2022-06-18 RX ADMIN — ACETAMINOPHEN 650 MG: 325 TABLET ORAL at 22:48

## 2022-06-18 ASSESSMENT — PAIN DESCRIPTION - ORIENTATION: ORIENTATION: RIGHT;LEFT

## 2022-06-18 ASSESSMENT — PAIN DESCRIPTION - LOCATION: LOCATION: FOOT

## 2022-06-18 ASSESSMENT — PAIN SCALES - WONG BAKER: WONGBAKER_NUMERICALRESPONSE: 0

## 2022-06-18 ASSESSMENT — PAIN DESCRIPTION - FREQUENCY: FREQUENCY: CONTINUOUS

## 2022-06-18 ASSESSMENT — SLEEP AND FATIGUE QUESTIONNAIRES
DO YOU USE A SLEEP AID: NO
DO YOU HAVE DIFFICULTY SLEEPING: NO
AVERAGE NUMBER OF SLEEP HOURS: 6

## 2022-06-18 ASSESSMENT — PAIN DESCRIPTION - ONSET: ONSET: ON-GOING

## 2022-06-18 ASSESSMENT — PAIN DESCRIPTION - DESCRIPTORS: DESCRIPTORS: ACHING;SORE

## 2022-06-18 ASSESSMENT — PAIN DESCRIPTION - PAIN TYPE: TYPE: ACUTE PAIN

## 2022-06-18 ASSESSMENT — LIFESTYLE VARIABLES
HOW OFTEN DO YOU HAVE A DRINK CONTAINING ALCOHOL: NEVER
HOW MANY STANDARD DRINKS CONTAINING ALCOHOL DO YOU HAVE ON A TYPICAL DAY: PATIENT DECLINED

## 2022-06-18 ASSESSMENT — PAIN SCALES - GENERAL: PAINLEVEL_OUTOF10: 7

## 2022-06-18 ASSESSMENT — PAIN - FUNCTIONAL ASSESSMENT: PAIN_FUNCTIONAL_ASSESSMENT: PREVENTS OR INTERFERES SOME ACTIVE ACTIVITIES AND ADLS

## 2022-06-18 ASSESSMENT — PATIENT HEALTH QUESTIONNAIRE - PHQ9: SUM OF ALL RESPONSES TO PHQ QUESTIONS 1-9: 23

## 2022-06-19 PROCEDURE — 6370000000 HC RX 637 (ALT 250 FOR IP): Performed by: PSYCHIATRY & NEUROLOGY

## 2022-06-19 PROCEDURE — 1240000000 HC EMOTIONAL WELLNESS R&B

## 2022-06-19 PROCEDURE — 6370000000 HC RX 637 (ALT 250 FOR IP)

## 2022-06-19 PROCEDURE — APPSS180 APP SPLIT SHARED TIME > 60 MINUTES

## 2022-06-19 PROCEDURE — 99222 1ST HOSP IP/OBS MODERATE 55: CPT | Performed by: PSYCHIATRY & NEUROLOGY

## 2022-06-19 PROCEDURE — 6360000002 HC RX W HCPCS

## 2022-06-19 PROCEDURE — 99223 1ST HOSP IP/OBS HIGH 75: CPT | Performed by: INTERNAL MEDICINE

## 2022-06-19 RX ORDER — OLANZAPINE 10 MG/1
5 TABLET, ORALLY DISINTEGRATING ORAL EVERY 4 HOURS PRN
Status: DISCONTINUED | OUTPATIENT
Start: 2022-06-19 | End: 2022-06-21 | Stop reason: HOSPADM

## 2022-06-19 RX ORDER — PANTOPRAZOLE SODIUM 40 MG/1
40 TABLET, DELAYED RELEASE ORAL
Status: DISCONTINUED | OUTPATIENT
Start: 2022-06-20 | End: 2022-06-21 | Stop reason: HOSPADM

## 2022-06-19 RX ORDER — ARIPIPRAZOLE 5 MG/1
5 TABLET ORAL DAILY
Status: DISCONTINUED | OUTPATIENT
Start: 2022-06-19 | End: 2022-06-19

## 2022-06-19 RX ORDER — CHLORPROMAZINE HYDROCHLORIDE 25 MG/1
50 TABLET, FILM COATED ORAL 3 TIMES DAILY PRN
Status: DISCONTINUED | OUTPATIENT
Start: 2022-06-19 | End: 2022-06-19

## 2022-06-19 RX ORDER — DIPHENHYDRAMINE HYDROCHLORIDE 50 MG/ML
50 INJECTION INTRAMUSCULAR; INTRAVENOUS ONCE
Status: COMPLETED | OUTPATIENT
Start: 2022-06-19 | End: 2022-06-19

## 2022-06-19 RX ORDER — ARIPIPRAZOLE 10 MG/1
10 TABLET ORAL ONCE
Status: DISCONTINUED | OUTPATIENT
Start: 2022-06-19 | End: 2022-06-20

## 2022-06-19 RX ORDER — BUPRENORPHINE AND NALOXONE 8; 2 MG/1; MG/1
1.5 FILM, SOLUBLE BUCCAL; SUBLINGUAL DAILY
Status: DISCONTINUED | OUTPATIENT
Start: 2022-06-19 | End: 2022-06-21 | Stop reason: HOSPADM

## 2022-06-19 RX ORDER — RISPERIDONE 0.5 MG/1
0.5 TABLET, FILM COATED ORAL 2 TIMES DAILY
Status: DISCONTINUED | OUTPATIENT
Start: 2022-06-19 | End: 2022-06-19

## 2022-06-19 RX ORDER — ARIPIPRAZOLE 15 MG/1
15 TABLET ORAL DAILY
Status: DISCONTINUED | OUTPATIENT
Start: 2022-06-20 | End: 2022-06-21 | Stop reason: HOSPADM

## 2022-06-19 RX ORDER — CHLORPROMAZINE HYDROCHLORIDE 25 MG/1
50 TABLET, FILM COATED ORAL EVERY 4 HOURS PRN
Status: DISCONTINUED | OUTPATIENT
Start: 2022-06-19 | End: 2022-06-19

## 2022-06-19 RX ADMIN — TRAZODONE HYDROCHLORIDE 50 MG: 50 TABLET ORAL at 22:10

## 2022-06-19 RX ADMIN — NICOTINE POLACRILEX 2 MG: 2 GUM, CHEWING BUCCAL at 18:45

## 2022-06-19 RX ADMIN — ARIPIPRAZOLE 5 MG: 5 TABLET ORAL at 11:12

## 2022-06-19 RX ADMIN — NICOTINE POLACRILEX 2 MG: 2 GUM, CHEWING BUCCAL at 22:10

## 2022-06-19 RX ADMIN — NICOTINE POLACRILEX 2 MG: 2 GUM, CHEWING BUCCAL at 10:25

## 2022-06-19 RX ADMIN — BUPRENORPHINE AND NALOXONE 1.5 FILM: 8; 2 FILM BUCCAL; SUBLINGUAL at 11:11

## 2022-06-19 RX ADMIN — NICOTINE POLACRILEX 2 MG: 2 GUM, CHEWING BUCCAL at 05:54

## 2022-06-19 RX ADMIN — DIPHENHYDRAMINE HYDROCHLORIDE 50 MG: 50 INJECTION, SOLUTION INTRAMUSCULAR; INTRAVENOUS at 14:21

## 2022-06-19 NOTE — PROGRESS NOTES
Behavioral Services  Medicare Certification Upon Admission    I certify that this patient's inpatient psychiatric hospital admission is medically necessary for:    [x] (1) Treatment which could reasonably be expected to improve this patient's condition,       [x] (2) Or for diagnostic study;     AND     [x](2) The inpatient psychiatric services are provided while the individual is under the care of a physician and are included in the individualized plan of care.     Estimated length of stay/service 3 to 5 days    Plan for post-hospital care outpatient [address]    Electronically signed by Roberto Thomas MD on 6/19/2022 at 11:00 AM

## 2022-06-19 NOTE — PLAN OF CARE
[facility]  Initial Interdisciplinary Treatment Plan NOTE      Original treatment plan Date & Time: 6/19/2022                    853am    Admission Type:  Admission Type:  Involuntary    Reason for admission:   Reason for Admission: delusional/bizarre behavior    Estimated Length of Stay:  5-7days  Estimated Discharge Date: to be determined by physician    PATIENT STRENGTHS:  Patient Strengths:   Patient Strengths and Limitations:   Addictive Behavior: Addictive Behavior  In the Past 3 Months, Have You Felt or Has Someone Told You That You Have a Problem With  : None  Medical Problems:  Past Medical History:   Diagnosis Date    Anxiety     Arthritis     Asthma     Bipolar I disorder, most recent episode (or current) depressed, unspecified 9/12/2014    Clostridium difficile infection     COPD (chronic obstructive pulmonary disease) ([address]     Depression     Disease of blood and blood forming organ     Eczema     Fracture, metacarpal     R 4th and 5th    Gastric ulcer     Gastritis 06/13/2018    GERD (gastroesophageal reflux disease)     GI bleed     H. 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[address]     Substance abuse ([address]     Hx of opiates, benzos, cocaine, alcohol abuse    Suicidal ideation     Suicide attempt by hanging ([address]     Tobacco dependence     Ulcerative colitis ([address]     UTI (urinary tract infection)      Status EXAM:Mental Status and Behavioral Exam  Normal: No  Level of Assistance: Independent/Self  Facial Expression: Flat,Avoids Gaze  Affect: Blunt,Constricted  Level of Consciousness: Alert  Frequency of Checks: 4 times per hour, close  Mood:Normal: No  Mood: Anxious,Depressed,Empty,Sad  Motor Activity:Normal: No  Motor Activity: Decreased  Eye Contact: Poor  Observed Behavior: Preoccupied,Cooperative  Sexual Misconduct History: Current - no  Preception: Eliot to person,[place] to time,[place] to place  Attention:Normal: No  Attention: Distractible,Unable to concentrate  Thought Processes: Blocking  Thought Content:Normal: No  Thought Content: Preoccupations,Poverty of content,Delusions  Depression Symptoms: Impaired concentration,Loss of interest,Feelings of helplessness,Change in energy level  Anxiety Symptoms: Generalized  Elizabeth Symptoms: No problems reported or observed.   Hallucinations: None  Delusions: Yes  Delusions: Paranoid  Memory:Normal: No  Memory: Poor recent,Poor remote  Insight and Judgment: No  Insight and Judgment: Poor judgment,Poor insight,Unmotivated    EDUCATION:   Learner Progress Toward Treatment Goals: reviewed group plans and strategies for care    Method:group therapy, medication compliance, individualized assessments and care planning    Outcome: needs reinforcement    PATIENT GOALS: to be discussed with patient within 72 hours    PLAN/TREATMENT RECOMMENDATIONS:     continue group therapy , medications compliance, goal setting, individualized assessments and care, continue to monitor pt on unit      SHORT-TERM GOALS:   Time frame for Short-Term Goals: 5-7 days    LONG-TERM GOALS:  Time frame for Long-Term Goals: 6 months  Members Present in Team Meeting: See Signature Sheet    Dianne Love

## 2022-06-19 NOTE — H&P
Department of Psychiatry  Attending Physician Psychiatric Assessment     Reason for Admission to Psychiatric Unit:  Acute disordered/bizarre behavior or psychomotor agitation or retardation;interferes with ADLs so that patient cannot function at a less intensive care level of care during evaluation and treatment   A mental disorder causing major disability in social, interpersonal, occupational, and/or educational functioning that is leading to dangerous or life-threatening functioning, and that can only be addressed in an acute inpatient setting   A mental disorder that causes an inability to maintain adequate nurtrition or self-care, and family/community support cannot provide reliable, essential care, so that the patient cannont function at a less intensive level of care during evaluation and treatment   Failure of outpatient psychiatry treatment so that the beneficiary requires 24 hour professional observation and care  Concerns about patient's safety in the community    CHIEF COMPLAINT: Acute psychosis    History obtained from: Patient, electronic medical record          HISTORY OF PRESENT ILLNESS:    Mary Jane Ortiz is a 32 y.o. male who has a past medical history of depression, suicidal ideation, opiate dependence, sleep apnea, asthma, IBS. Patient presented to the ED after he was picked up by [facility] police when he was found naked and crying in the street. At that time patient reports that he bought a drink off of [employer] that was from \"1.5 billion [employer]\" that caused his bloodstream to be poisoned. Patient has had multiple inpatient psychiatric hospitalizations. Most recent to [facility] on 5/28/2022 to 5/31/2022. At that time patient was discharged on Effexor and Suboxone. Patient reports that he has not been compliant with medications in the community apart from Suboxone. Patient was prescribed Suboxone 8-2 mg last filled on 6/16, 11 films for 7 days.   Patient endorses multiple allergies to antipsychotic medication. Risperdal 0.5 twice daily has been started at this time and patient states he will maintain medication compliance in the hospital and after discharge. When approached for interview patient states that he was walking in the street trying to get help. Patient reports paranoia that people are chasing him and he has thoughts that the world was going to end. Patient states he just wanted to make it home to his wife and daughter. Throughout interview patient was actively responding and attending to internal stimuli. Nursing staff reports patient has been loudly responding to internal stimuli in his room. Patient presented with poor concentration and slow delayed responses. Patient currently endorses auditory hallucinations telling him that people are stealing his stuff. Patient denies visual hallucinations. Patient made multiple delusional statements throughout interview does not appear to be oriented to situation in the hospital.  Patient currently presents with inability to care for self and needs constant redirection to remain properly clothed on the unit. At this time, the patient is not able to contract for safety outside the hospital and is not appropriate for a lower level of care. There is risk of decompensation and patient warrants further hospitalization for safety and stabilization. Patient is currently denying any depression however endorses a history of. Patient denies any current suicidal ideation. Patient denies anxiety. Patient was unable to clearly communicate symptoms of lacey at time of interview. Per chart patient has a history of bipolar symptoms. When discussing alcohol consumption patient denies current use however endorses history of. When discussing illicit drug use patient was unable to clearly communicate. Patient reports he does not believe he has been doing drugs. Patient has a history of cocaine, marijuana and fentanyl use.   UDS pending upon admission. History of head trauma: [x] Yes [] No    History of seizures: [] Yes [x] No    History of violence or aggression: [x] Yes [] No         PSYCHIATRIC HISTORY:  [x] Yes [] No    Currently follows with [facility], unclear if currently following up  Endorses lifetime suicide attempts  Multiple psychiatric hospital admissions. Most recently [facility] on 5/20/2022    Home Medication Compliance: [] Yes [x] No    Past psychiatric medications includes: Clonidine, Remeron, Geodon, Cymbalta, Abilify, Aristada, Neurontin, trazodone, Seroquel, Suboxone, methadone, Wellbutrin, Zyprexa, Haldol    Adverse reactions from psychotropic medications: [x] Yes [] No  Reports \"skin breakout\" after taking Cymbalta. Endorses reaction to Haldol including throat swelling and thick tongue.    Anaphylaxis with Geodon. Remeron caused increased agitation.          Lifetime Psychiatric Review of Systems         Depression: Denies      Anxiety: Denies     Panic Attacks: Denies     Elizabeth or Hypomania: Denies, history of per chart     Phobias: Denies     Obsessions and Compulsions: Denies     Body or Vocal Tics: Denies     Visual Hallucinations: Denies     Auditory Hallucinations: Endorses     Delusions/Paranoia: Endorses     PTSD: Endorses    Past Medical History:        Diagnosis Date    Anxiety     Arthritis     Asthma     Bipolar I disorder, most recent episode (or current) depressed, unspecified 9/12/2014    Clostridium difficile infection     COPD [facility]) [facility]     Depression     Disease of blood and blood forming organ     Eczema     Fracture, metacarpal     R 4th and 5th    Gastric ulcer     Gastritis 06/13/2018    GERD (gastroesophageal reflux disease)     GI bleed     H. 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[facility]     Substance abuse ([facility]     Hx of opiates, benzos, cocaine, alcohol abuse    Suicidal ideation     Suicide attempt by hanging ([facility]     Tobacco dependence     Ulcerative colitis ([facility]     UTI (urinary tract infection)        Past Surgical History:        Procedure Laterality Date    ABDOMINAL SURGERY      upper GI scope 7/7/2015    BRONCHOSCOPY      CHOLECYSTECTOMY, LAPAROSCOPIC  07/14/2017    surgery performed at [address] [place] [address], COLON, DIAGNOSTIC      OTHER SURGICAL HISTORY      lumbar puncture    MI COLONOSCOPY W/BIOPSY SINGLE/MULTIPLE  8/9/2017    COLONOSCOPY WITH BIOPSY performed by Greyson Samson MD at [address] [place] [address] EGD TRANSORAL BIOPSY SINGLE/MULTIPLE N/A 3/20/2017    EGD BIOPSY performed by Opal Tidwell MD at [facility]    MI ESOPHAGOGASTRODUODENOSCOPY TRANSORAL DIAGNOSTIC N/A 8/9/2017    EGD ESOPHAGOGASTRODUODENOSCOPY performed by Greyson Samson MD at [facility] N/A 3/20/2017    SIGMOIDOSCOPY DIAGNOSTIC FLEXIBLE performed by Opal Tidwell MD at [address] [place] [address]  2/4/16    UPPER GASTROINTESTINAL ENDOSCOPY N/A 6/13/2018    GASTRITIS    UPPER GASTROINTESTINAL ENDOSCOPY N/A 9/20/2018    EGD BIOPSY performed by Hema Sanchez MD at [facility] OR       Allergies:  Dicyclomine, Famotidine, Geodon [ziprasidone hcl], Haloperidol, Iv dye [iodides], Reglan [metoclopramide], Ibuprofen, Aspirin, Dicyclomine hcl, Hydroxyzine hcl, Iodine, Metronidazole, Mirtazapine, Promethazine, and Ziprasidone         Social History:     Born in: [place] [address] [facility] that he was raised by both his mother and his father. Cee Landa reports that he has 4 brothers and 1 sister whom he is close with.  Highest Level of Education: Received his GED  Occupation: Currently unemployed receives [address]   Children: Patient previously reported that he has 2 daughters who he was not able to see. Patient currently states he thought he had 2 daughters but only one of them is his. Residence:  Homeless  Stressors:mental illness and history of polysubstance abuse  Patient Assets/Supportive Factors: Desire to receive mental health treatment         DRUG USE HISTORY  Social History     Tobacco Use   Smoking Status Current Every Day Smoker    Packs/day: 0.50    Years: 15.00    Pack years: 7.50    Types: Cigarettes   Smokeless Tobacco Never Used     Social History     Substance and Sexual Activity   Alcohol Use Not Currently    Comment: drinks daily     Social History     Substance and Sexual Activity   Drug Use Yes    Types: Opiates , Cocaine    Comment: Hx of opiates, benzos, cocaine, alcohol abuse       When discussing alcohol consumption patient denies current use however endorses history of. When discussing illicit drug use patient was unable to clearly communicate. Patient reports he does not believe he has been doing drugs. Patient has a history of cocaine, marijuana and fentanyl use. UDS pending upon admission.           LEGAL HISTORY:   HISTORY OF INCARCERATION: [x] Yes [] No    Family History:       Problem Relation Age of Onset    Diabetes Father     Alcohol Abuse Father     Depression Father     Arthritis Father     High Blood Pressure Father     Other Father         aneurysm & epilepsy    Migraines Father     Arthritis Mother     Other Mother         aneurysm & epilepsy    Migraines Mother     Diabetes Brother         Aunt and uncles    Depression Brother     Mental Illness Brother     Other Brother         epilepsy    Migraines Brother     Stroke Other         Uncle    Other Brother         murdered Oct 6th, 2014    Colon Cancer Paternal Cousin 43    Other Sister         epilepsy    Migraines Sister        Psychiatric Family History  Patient endorses psychiatric family history. Suicides in family: [] Yes [x] No    Substance use in family: [] Yes [x] No         PHYSICAL EXAM:  Vitals:  /65   Pulse 62   Temp 98 °F (36.7 °C) (Oral)   Resp 14   Ht 5' 7\" (1.702 m)   Wt 188 lb 8 oz (85.5 kg)   BMI 29.52 kg/m²   Pain Level: Denies    LABS:  Labs reviewed: [x] Yes      Last EKG in EMR reviewed: [x] Yes  QTc 430 on 5/24/2022          Review of Systems   Constitutional: Negative for chills and weight loss. HENT: Negative for ear pain and nosebleeds. Eyes: Negative for blurred vision and photophobia. Respiratory: Negative for cough, shortness of breath and wheezing. Cardiovascular: Negative for chest pain and palpitations. Gastrointestinal: Negative for abdominal pain, diarrhea and vomiting. Genitourinary: Negative for dysuria and urgency. Musculoskeletal: Negative for falls and joint pain. Skin: Negative for itching and rash. Neurological: Negative for tremors, seizures and weakness. Endo/Heme/Allergies: Does not bruise/bleed easily. Physical Exam:   Constitutional:  Appears well-developed and well-nourished, no acute distress. HENT:   Head: Normocephalic and atraumatic. Eyes: Conjunctivae are normal. Right eye exhibits no discharge. Left eye exhibits no discharge. No scleral icterus. Neck: Normal range of motion. Neck supple. Pulmonary/Chest:  No respiratory distress or accessory muscle use, no wheezing. Cardiac: Regular rate and rhythm. Abdominal: Soft. Non-tender. Exhibits no distension. Musculoskeletal: Normal range of motion. Exhibits no edema. Neurological: cranial nerves II-XII grossly in tact, normal gait and station. Skin: Skin is warm and dry. Patient is not diaphoretic. No erythema.       Mental Status Examination:    Level of consciousness: Awake and alert  Appearance:   Requiring redirection to maintain proper clothing attire on unit, seated in chair, Poor grooming   Behavior/Motor: Approachable, bizarre movements, psychomotor agitation noted  Attitude toward examiner:   [address] cooperation, poor attention, fair eye contact  Speech: Slow rate, normal volume, and tone. Mood: \"Okay\"  Affect:  Inappropriate  Thought processes: illogical and incoherent. Thought content: Denies suicidal ideations, without current plan/intent to harm self on unit. Endorses auditory hallucinations, denies visual homicidal ideations. Actively responding to internal stimuli.               Denies hallucinations              Denies delusions              Denies paranoia  Cognition:  Oriented to self, location  Concentration: Reduced  Memory: Impaired  Insight &Judgment: Poor           DSM-5 Diagnosis    Principal Problem: Acute psychosis (HCC)      Psychosocial and Contextual factors:  Financial   Occupational X  Relationship   Legal   Living situation X  Educational     Past Medical History:   Diagnosis Date    Anxiety     Arthritis     Asthma     Bipolar I disorder, most recent episode (or current) depressed, unspecified 9/12/2014    Clostridium difficile infection     COPD (chronic obstructive pulmonary disease) ([address]     Depression     Disease of blood and blood forming organ     Eczema     Fracture, metacarpal     R 4th and 5th    Gastric ulcer     Gastritis 06/13/2018    GERD (gastroesophageal reflux disease)     GI bleed     H. 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 Home medications reviewed. Medications as determined by attending physician  New order: Risperdal 0.5 twice daily  New order: Continue home medication of Suboxone 1.5 film (12 - 3 mg dose)  Monitor need and frequency of PRN medications. Attempt to develop insight. Follow-up daily while inpatient. Reviewed risks and benefits as well as potential side effects with patient. CONSULT:  [x] Yes [] No  Internal medicine for medical management/medical H&P  Nutritional consult    Risk Management: close watch per standard protocol      Psychotherapy: participation in milieu and group and individual sessions with Attending Physician,  and Physician Assistant/CNP      Estimated length of stay:  2-14 days      GENERAL PATIENT/FAMILY EDUCATION  Patient will understand basic signs and symptoms, patient will understand benefits/risks and potential side effects from proposed medications, and patient will understand their role in recovery. Family is not active in patient's care. Patient assets that may be helpful during treatment include: Intent to participate and engage in treatment, sufficient fund of knowledge and intellect to understand and utilize treatments. Goals:    1) Remission of acute psychosis. 2) Stabilization of symptoms prior to discharge. 3) Establish efficacy and tolerability of medications. Behavioral Services  Medicare Certification     Admission Day 1  I certify that this patient's inpatient psychiatric hospital admission is medically necessary for:    x (1) treatment which could reasonably be expected to improve this patient's condition, or    x (2) diagnostic study or its equivalent.      Time Spent: 60 minutes    Gonzalo Jacobs is a 32 y.o. male being evaluated face to face    --MASSIEL Ng CNP on 6/19/2022 at 11:46 AM    An electronic signature was used to authenticate this note. Psychiatry Attending Attestation     I independently saw and evaluated the patient. I reviewed the Advance Practice Provider's documentation above. Any additional comments or changes to the Advance Practice Provider's documentation are stated below otherwise agree with assessment. Patient is a 72-year-old male with history of psychosis and polysubstance abuse admitted for worsening psychosis. Patient reports he has been having commanding auditory hallucinations since he has taken a pill from another person from a different planet. Reports that these voices have asking him to kill himself and hurt other people. Patient was noted to standing in the middle of the unit and praying. Have concerns for polysubstance abuse and noncompliance with medications. Initially offered Risperdal however patient is adamant about not taking any Risperdal.  He tolerated Abilify in the past and we will start him at Abilify 15 mg daily. PLAN  Will start Abilify and titrate to effect  Attempt to develop insight  Psycho-education conducted. Supportive Therapy conducted.   Probable discharge is TBD  Follow-up TBD    Electronically signed by Louis Guillermo MD on 6/19/22 at 1:39 PM EDT

## 2022-06-19 NOTE — PROGRESS NOTES
[facility]  Admission Note     Admission Type:   Admission Type:  Involuntary    Reason for admission:  Reason for Admission: delusional/bizarre behavior    PATIENT STRENGTHS:       Patient Strengths and Limitations:       Addictive Behavior:   Addictive Behavior  In the Past 3 Months, Have You Felt or Has Someone Told You That You Have a Problem With  : None    Medical Problems:   Past Medical History:   Diagnosis Date    Anxiety     Arthritis     Asthma     Bipolar I disorder, most recent episode (or current) depressed, unspecified 9/12/2014    Clostridium difficile infection     COPD (chronic obstructive pulmonary disease) ([address]     Depression     Disease of blood and blood forming organ     Eczema     Fracture, metacarpal     R 4th and 5th    Gastric ulcer     Gastritis 06/13/2018    GERD (gastroesophageal reflux disease)     GI bleed     H. 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[address]     Substance abuse ([address]     Hx of opiates, benzos, cocaine, alcohol abuse    Suicidal ideation     Suicide attempt by hanging ([address]     Tobacco dependence     Ulcerative colitis ([address]     UTI (urinary tract infection)        Status EXAM:  Mental Status and Behavioral Exam  Normal: No  Level of Assistance: Independent/Self  Facial Expression: Flat,Avoids Gaze  Affect: Blunt,Constricted  Level of Consciousness: Alert  Frequency of Checks: 4 times per hour, close  Mood:Normal: No  Mood: Anxious,Depressed,Empty,Sad  Motor Activity:Normal: No  Motor Activity: Decreased  Eye Contact: Poor  Observed Behavior: Preoccupied,Cooperative  Sexual Misconduct History: Current - no  Preception: Lawrence to person,[place]rence to time,[place] to place  Attention:Normal: No  Attention: Distractible,Unable to concentrate  Thought Processes: Blocking  Thought Content:Normal: No  Thought Content: Preoccupations,Poverty of content,Delusions  Depression Symptoms: Impaired concentration,Loss of interest,Feelings of helplessness,Change in energy level  Anxiety Symptoms: Generalized  Elizabeth Symptoms: No problems reported or observed.   Hallucinations: None  Delusions: Yes  Delusions: Paranoid  Memory:Normal: No  Memory: Poor recent,Poor remote  Insight and Judgment: No  Insight and Judgment: Poor judgment,Poor insight,Unmotivated    Tobacco Screening:  Practical Counseling, on admission, reuben X, if applicable and completed (first 3 are required if patient doesn't refuse):            ( )  Recognizing danger situations (included triggers and roadblocks)                    ( )  Coping skills (new ways to manage stress, exercise, relaxation techniques, changing routine, distraction)                                                           ( )  Basic information about quitting (benefits of quitting, techniques in how to quit, available resources  ( ) Referral for counseling faxed to [facility]                                           ( ) Patient refused counseling  ( ) Patient has not smoked in the last 30 days    Metabolic Screening:    Lab Results   Component Value Date    LABA1C 5.7 10/23/2014       Lab Results   Component Value Date    CHOL 205 (H) 02/21/2017    CHOL 158 11/09/2015    CHOL 152 10/23/2014    CHOL 206 (H) 03/05/2014    CHOL 205 (H) 01/22/2014    CHOL 208 (H) 08/28/2013     Lab Results   Component Value Date    TRIG 189 (H) 02/21/2017    TRIG 223 (H) 11/09/2015    TRIG 52 10/23/2014    TRIG 104 03/05/2014    TRIG 74 01/22/2014    TRIG 104 08/28/2013     Lab Results   Component Value Date    HDL 44 02/21/2017    HDL 40 (L) 11/09/2015    HDL 55 10/23/2014 HDL 54 03/05/2014    HDL 53 01/22/2014    HDL 72 (H) 08/28/2013     No components found for: LDLCAL  No results found for: LABVLDL      Body mass index is 29.52 kg/m². BP Readings from Last 2 Encounters:   06/18/22 132/79   06/06/22 115/86           Pt admitted with followings belongings:  Dental Appliances: None  Vision - Corrective Lenses: None  Hearing Aid: None  Jewelry: None  Body Piercings Removed: N/A  Clothing: Undergarments  Other Valuables: Other (Comment) (none)     Patient's home medications to be reviewed by provider. Patient oriented to surroundings and program expectations and copy of patient rights given. Received admission packet. Consents reviewed, Refused. Patient verbalize understanding. Patient education on precautions. Patient from [facility] after being found by police wandering streets naked and crying. According to pink slip, he was responding to internal stimuli and his speech was not making sense. He arrived in hospital clothing. Patient is cooperative but having difficulty answering staff's questions appropriately.               Rosalba Reynolds RN

## 2022-06-19 NOTE — CARE COORDINATION
B[facility] Biopsychosocial Assessment    Current Level of Psychosocial Functioning     Independent X  Dependent    Minimal Assist     Comments:    Psychosocial High Risk Factors (check all that apply)    Unable to obtain meds   Chronic illness/pain    Substance abuse X  Lack of Family Support   Financial stress   Isolation   Inadequate Community Resources  Suicide attempt(s)  Not taking medications X  Victim of crime   Developmental Delay  Unable to manage personal needs    Age 72 or older   Homeless X  No transportation   Readmission within 30 days  Unemployment  Traumatic Event    Comments:   Psychiatric Advanced Directives: n/a    Family to Involve in Treatment: none at this time    Sexual Orientation:  n/a    Patient Strengths: patient has insurance    Patient Barriers: substance use, homeless, poor problem solving skills, poor nutrition, non-compliance with treatment and medications      Opiate Education Provided:  Not at this time      CMHC/mental health history: [facility]    Plan of Care   medication management, group/individual therapies, family meetings, psycho -education, treatment team meetings to assist with stabilization    Initial Discharge Plan:  Patient states he wishes to be linked to [facility]      Clinical Summary:    Jena Mckenzie is a 33 y/o male admitted to [place] [address] for suicidal ideations, paranoia, and hallucinations. Social work attempted to meet with patient today on the unit for assessment however patient refused to actually sit with social work one on one to talk and kept pacing the unit. He was focused on seeking nurse practitioner for nutritional drinks and easily stimulated others on the unit. Patient was dismissive and only offered that he is trying to be connected to [facility] at discharge. He states he does not remember what drugs he used prior to admission and unable to give any other information.   Social work did notice that patient's appearance is more unkempt than in the past and he appears somewhat gaunt. Social will continue to engage him in treatment and offer support.

## 2022-06-19 NOTE — PROGRESS NOTES
Patient given tobacco quitline number 14776747341 at this time, refusing to call at this time, states \" I just dont want to quit now\"- patient given information as to the dangers of long term tobacco use. Continue to reinforce the importance of tobacco cessation.

## 2022-06-19 NOTE — H&P
[facility] Medicine  John De Luna MD; Karen Lozoya MD; Lucy Valdez MD; MD Israel Simms MD; MD ARBEN [facility] Internal Medicine   [facility]    HISTORY AND PHYSICAL EXAMINATION            Date:   6/19/2022  Patient name:  Luiz Varela  Date of admission:  6/18/2022 10:36 PM  MRN:   [id number]  Account:  [de-identified]  YOB: 1990  PCP:    No primary care provider on file. Room:   [address] [place] [address]  Code Status:    Full Code    Chief Complaint:     No chief complaint on file. Prediabetes  History of peptic ulcer disease and gastritis and GERD  History Obtained From:     Patient medical record nursing staff    History of Present Illness:     Luiz Varela is a 32 y.o. Non- / non  male who presents with No chief complaint on file. and is admitted to the hospital for the management of Acute psychosis ([facility] [address]   22-year-old gentleman who has extensive history of gastritis peptic ulcer disease and GERD multiple EGDs in the past denies any nausea vomiting or any chest pain  Patient history of prediabetes A1c has been 5.8      Past Medical History:     Past Medical History:   Diagnosis Date    Anxiety     Arthritis     Asthma     Bipolar I disorder, most recent episode (or current) depressed, unspecified 9/12/2014    Clostridium difficile infection     COPD (chronic obstructive pulmonary disease) ([address]     Depression     Disease of blood and blood forming organ     Eczema     Fracture, metacarpal     R 4th and 5th    Gastric ulcer     Gastritis 06/13/2018    GERD (gastroesophageal reflux disease)     GI bleed     H. 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[address]     Substance abuse ([facility] [address]     Hx of opiates, benzos, cocaine, alcohol abuse    Suicidal ideation     Suicide attempt by hanging ([facility] [address]     Tobacco dependence     Ulcerative colitis ([facility] [address]     UTI (urinary tract infection)         Past Surgical History:     Past Surgical History:   Procedure Laterality Date    ABDOMINAL SURGERY      upper GI scope 7/7/2015    BRONCHOSCOPY      CHOLECYSTECTOMY, LAPAROSCOPIC  07/14/2017    surgery performed at [address] [place] [address], COLON, DIAGNOSTIC      OTHER SURGICAL HISTORY      lumbar puncture    TX COLONOSCOPY W/BIOPSY SINGLE/MULTIPLE  8/9/2017    COLONOSCOPY WITH BIOPSY performed by Francy Ortiz MD at [facility] EGD TRANSORAL BIOPSY SINGLE/MULTIPLE N/A 3/20/2017    EGD BIOPSY performed by Karla Hsu MD at [facility]    TX ESOPHAGOGASTRODUODENOSCOPY TRANSORAL DIAGNOSTIC N/A 8/9/2017    EGD ESOPHAGOGASTRODUODENOSCOPY performed by Francy Ortiz MD at [facility] N/A 3/20/2017    SIGMOIDOSCOPY DIAGNOSTIC FLEXIBLE performed by Karla Hsu MD at [facility]  2/4/16    UPPER GASTROINTESTINAL ENDOSCOPY N/A 6/13/2018    GASTRITIS    UPPER GASTROINTESTINAL ENDOSCOPY N/A 9/20/2018    E[facility]SY [facility]        Medications Prior to Admission:     Prior to Admission medications    Medication Sig Start Date End Date Taking?  Authorizing Provider   acetaminophen (TYLENOL) 500 MG tablet Take 2 tablets by mouth every 6 hours as needed for Pain or Fever 6/3/22   Debora Roes, DO   loratadine (CLARITIN) 10 MG tablet Take 1 tablet by mouth daily 6/3/22   Debora Roes, DO   venlafaxine (EFFEXOR XR) 37.5 MG extended release capsule Take 1 capsule by mouth daily (with breakfast) 5/31/22   Omar Yancey MD   meloxicam (MOBIC) 7.5 MG tablet Take 1 tablet by mouth 2 times daily as needed for Pain 8/19/21 10/30/21  MASSIEL Sandhu CNP        Allergies:     Dicyclomine, Famotidine, Geodon [ziprasidone hcl], Haloperidol, Iv dye [iodides], Reglan [metoclopramide], Ibuprofen, Aspirin, Dicyclomine hcl, Hydroxyzine hcl, Iodine, Metronidazole, Mirtazapine, Promethazine, and Ziprasidone    Social History:     Tobacco:    reports that he has been smoking cigarettes. He has a 7.50 pack-year smoking history. He has never used smokeless tobacco.  Alcohol:      reports previous alcohol use. Drug Use:  reports current drug use. Drugs: Opiates  and Cocaine. Family History:     Family History   Problem Relation Age of Onset    Diabetes Father     Alcohol Abuse Father     Depression Father     Arthritis Father     High Blood Pressure Father     Other Father         aneurysm & epilepsy    Migraines Father     Arthritis Mother     Other Mother         aneurysm & epilepsy    Migraines Mother     Diabetes Brother         Aunt and uncles    Depression Brother     Mental Illness Brother     Other Brother         epilepsy    Migraines Brother     Stroke Other         Uncle    Other Brother         murdered Oct 6th, 2014    Colon Cancer Paternal Cousin 43    Other Sister         epilepsy    Migraines Sister        Review of Systems:     Positive and Negative as described in HPI.     CONSTITUTIONAL:  negative for fevers, chills, sweats, fatigue, weight loss  HEENT:  negative for vision, hearing changes, runny nose, throat pain  RESPIRATORY:  negative for shortness of breath, cough, congestion, wheezing  CARDIOVASCULAR:  negative for chest pain, palpitations  GASTROINTESTINAL:  negative for nausea, vomiting, diarrhea, constipation, change in bowel habits, abdominal pain   GENITOURINARY:  negative for difficulty of urination, burning with urination, frequency   INTEGUMENT:  negative for rash, skin lesions, easy bruising   HEMATOLOGIC/LYMPHATIC:  negative for swelling/edema   ALLERGIC/IMMUNOLOGIC:  negative for urticaria , itching  ENDOCRINE:  negative increase in drinking, increase in urination, hot or cold intolerance  MUSCULOSKELETAL:  negative joint pains, muscle aches, swelling of joints  NEUROLOGICAL:  negative for headaches, dizziness, lightheadedness, numbness, pain, tingling extremities      Physical Exam:   /65   Pulse 62   Temp 98 °F (36.7 °C) (Oral)   Resp 14   Ht 5' 7\" (1.702 m)   Wt 188 lb 8 oz (85.5 kg)   BMI 29.52 kg/m²   Temp (24hrs), Av.1 °F (36.7 °C), Min:98 °F (36.7 °C), Max:98.2 °F (36.8 °C)    No results for input(s): POCGLU in the last 72 hours.   No intake or output data in the 24 hours ending 22 1748    General Appearance: alert, well appearing, and in no acute distress  Mental status: oriented to person, place, and time  Head: normocephalic, atraumatic  Eye: no icterus, redness, pupils equal and reactive, extraocular eye movements intact, conjunctiva clear  Ear: normal external ear, no discharge, hearing intact  Nose: no drainage noted  Mouth: mucous membranes moist  Neck: supple, no carotid bruits, thyroid not palpable  Lungs: Bilateral equal air entry, clear to ausculation, no wheezing, rales or rhonchi, normal effort  Cardiovascular: normal rate, regular rhythm, no murmur, gallop, rub  Abdomen: Soft, nontender, nondistended, normal bowel sounds, no hepatomegaly or splenomegaly  Neurologic: There are no new focal motor or sensory deficits, normal muscle tone and bulk, no abnormal sensation, normal speech, cranial nerves II through XII grossly intact  Skin: No gross lesions, rashes, bruising or bleeding on exposed skin area  Extremities: peripheral pulses palpable, no pedal edema or calf pain with palpation      Investigations:      Laboratory Testing:  No results found for this or any previous visit (from the past

## 2022-06-19 NOTE — PLAN OF CARE
Problem: Chronic Conditions and Co-morbidities  Goal: Patient's chronic conditions and co-morbidity symptoms are monitored and maintained or improved  6/19/2022 1031 by Elaine Camejo RN  Outcome: Progressing     Problem: Pain  Goal: Verbalizes/displays adequate comfort level or baseline comfort level  6/19/2022 1031 by Elaine Camejo RN  Outcome: Progressing  Flowsheets (Taken 6/19/2022 1031)  Verbalizes/displays adequate comfort level or baseline comfort level:   Encourage patient to monitor pain and request assistance   Assess pain using appropriate pain scale   Administer analgesics based on type and severity of pain and evaluate response  Note: Patient denies pain upon assessment. Will monitor. Problem: Psychosis  Goal: Will report no hallucinations or delusions  Description: INTERVENTIONS:  1. Administer medication as  ordered  2. Assist with reality testing to support increasing orientation  3. Assess if patient's hallucinations or delusions are encouraging self harm or harm to others and intervene as appropriate  6/19/2022 1031 by Elaine Camejo RN  Outcome: Progressing  Note: Patient alert and orient x 2, to person and place. Patient disoriented to time/date and situation. Patient oriented to time and situation and verbalizes understanding. Speech clear. Patient in anxious mood and requires redirection at times. Patient is cooperative with staff. Patient denies suicidal/homicidal/self harm ideations. Patient denies hallucinations but observed engaging in self talk in day room and alone in room. Patient reports poor sleep and appetite is good. Patient in behavioral control. Support provided. Safety maintained with every 15 minute checks and as needed. Problem: Anxiety  Goal: Will report anxiety at manageable levels  Description: INTERVENTIONS:  1. Administer medication as ordered  2. Teach and rehearse alternative coping skills  3.  Provide emotional support with 1:1 interaction with staff  6/19/2022 1031 by Vamsi Coulter RN  Flowsheets (Taken 6/19/2022 1031)  Will report anxiety at manageable levels:   Administer medication as ordered   Provide emotional support with 1:1 interaction with staff   Teach and rehearse alternative coping skills

## 2022-06-20 PROBLEM — K27.9 PUD (PEPTIC ULCER DISEASE): Status: ACTIVE | Noted: 2022-06-20

## 2022-06-20 PROBLEM — R73.03 PREDIABETES: Status: ACTIVE | Noted: 2022-06-20

## 2022-06-20 PROBLEM — K21.9 GASTROESOPHAGEAL REFLUX DISEASE WITHOUT ESOPHAGITIS: Status: ACTIVE | Noted: 2022-06-20

## 2022-06-20 PROCEDURE — 1240000000 HC EMOTIONAL WELLNESS R&B

## 2022-06-20 PROCEDURE — 6370000000 HC RX 637 (ALT 250 FOR IP): Performed by: INTERNAL MEDICINE

## 2022-06-20 PROCEDURE — 6370000000 HC RX 637 (ALT 250 FOR IP): Performed by: PSYCHIATRY & NEUROLOGY

## 2022-06-20 PROCEDURE — APPSS30 APP SPLIT SHARED TIME 16-30 MINUTES

## 2022-06-20 PROCEDURE — 99232 SBSQ HOSP IP/OBS MODERATE 35: CPT | Performed by: INTERNAL MEDICINE

## 2022-06-20 PROCEDURE — 6370000000 HC RX 637 (ALT 250 FOR IP)

## 2022-06-20 PROCEDURE — 99232 SBSQ HOSP IP/OBS MODERATE 35: CPT | Performed by: PSYCHIATRY & NEUROLOGY

## 2022-06-20 RX ORDER — CETIRIZINE HYDROCHLORIDE 10 MG/1
10 TABLET ORAL DAILY
Status: DISCONTINUED | OUTPATIENT
Start: 2022-06-20 | End: 2022-06-21 | Stop reason: HOSPADM

## 2022-06-20 RX ADMIN — BUPRENORPHINE AND NALOXONE 1.5 FILM: 8; 2 FILM BUCCAL; SUBLINGUAL at 08:43

## 2022-06-20 RX ADMIN — NICOTINE POLACRILEX 2 MG: 2 GUM, CHEWING BUCCAL at 11:48

## 2022-06-20 RX ADMIN — NICOTINE POLACRILEX 2 MG: 2 GUM, CHEWING BUCCAL at 20:13

## 2022-06-20 RX ADMIN — NICOTINE POLACRILEX 2 MG: 2 GUM, CHEWING BUCCAL at 07:14

## 2022-06-20 RX ADMIN — NICOTINE POLACRILEX 2 MG: 2 GUM, CHEWING BUCCAL at 15:36

## 2022-06-20 RX ADMIN — CETIRIZINE HYDROCHLORIDE 10 MG: 10 TABLET, FILM COATED ORAL at 15:36

## 2022-06-20 RX ADMIN — PANTOPRAZOLE SODIUM 40 MG: 40 TABLET, DELAYED RELEASE ORAL at 08:43

## 2022-06-20 RX ADMIN — ARIPIPRAZOLE 15 MG: 15 TABLET ORAL at 08:43

## 2022-06-20 RX ADMIN — NICOTINE POLACRILEX 2 MG: 2 GUM, CHEWING BUCCAL at 08:43

## 2022-06-20 RX ADMIN — NICOTINE POLACRILEX 2 MG: 2 GUM, CHEWING BUCCAL at 17:29

## 2022-06-20 RX ADMIN — TRAZODONE HYDROCHLORIDE 50 MG: 50 TABLET ORAL at 22:04

## 2022-06-20 NOTE — PLAN OF CARE
Problem: Chronic Conditions and Co-morbidities  Goal: Patient's chronic conditions and co-morbidity symptoms are monitored and maintained or improved  6/19/2022 2053 by Coreen Gutiérrez RN  Outcome: Progressing     Problem: Pain  Goal: Verbalizes/displays adequate comfort level or baseline comfort level  6/19/2022 2053 by Coreen Gutiérrez RN  Note: Patient denies pain at this time. Problem: Psychosis  Goal: Will report no hallucinations or delusions  Description: INTERVENTIONS:  1. Administer medication as  ordered  2. Assist with reality testing to support increasing orientation  3. Assess if patient's hallucinations or delusions are encouraging self harm or harm to others and intervene as appropriate  6/19/2022 2053 by Coreen Gutiérrez RN  Note: Patient denies auditory or visual hallucinations, although is observed on the unit responding to internal stimuli. Patient able to have conversation, with delayed responses. Patient denies suicidal or homicidal ideations. He reports wanting to get set up with [facility] when he leaves here. Patient reports poor sleep, adequate appetite. Problem: Anxiety  Goal: Will report anxiety at manageable levels  Description: INTERVENTIONS:  1. Administer medication as ordered  2. Teach and rehearse alternative coping skills  3. Provide emotional support with 1:1 interaction with staff  6/19/2022 2053 by Coreen Gutiérrez RN  Flowsheets (Taken 6/19/2022 1943)  Will report anxiety at manageable levels: Administer medication as ordered  Note: Patient reports anxiety at manageable levels.

## 2022-06-20 NOTE — PROGRESS NOTES
Daily Progress Note  6/20/2022    Patient Name: Gonzalo Jacobs    CHIEF COMPLAINT: Acute psychosis         SUBJECTIVE:      Patient is seen today for a follow up assessment. Patient has been compliant with scheduled medication at this time is not required emergency medications past 24 hours. When approached for interview patient states that he is feeling better today patient states his depression is low and is denying suicidal ideation. Patient is reporting improvement of paranoia and is showing improvement in concentration and does not appear to be responding to internal stimuli during interview. Patient states he is hopeful about moving forward with treatment in the community and is hopeful about discharge. Patient has maintained appropriate clothing on the unit and has not required redirection today. Patient has been intermittently cooperative with groups on unit and was encouraged to continue compliance. Appetite:  [x] Adequate/Unchanged  [] Increased  [] Decreased      Sleep:       [] Adequate/Unchanged  [x] Fair  [] Poor      Group Attendance on Unit:   [] Yes   [x] Selectively    [] No    Medication Side Effects: Denies         Mental Status Exam  Level of consciousness: Alert and awake. Appearance: Appropriate attire for setting, seated in chair, with fair  grooming and hygiene. Behavior/Motor: Approachable, compliant with interview  Attitude toward examiner: Cooperative, attentive, good eye contact. Speech: normal volume and well articulated   Mood:  Patient reports \" better\". Affect: Mood congruent  Thought processes: Linear, goal directed and coherent. Thought content: Denies homicidal ideation. Suicidal Ideation: Denies suicidal ideations, without current intent to harm self on unit. Delusions: No evidence of delusions. Reports improvement in paranoia. Perceptual Disturbance: Patient does not appear to be responding to internal stimuli. Denies auditory hallucinations.  Denies PRN    ASSESSMENT  Acute psychosis ([facility] [address]         HANDOFF  Patient symptoms:  Showing modest improvement  Medications as determined by attending physician  Encourage participation in groups and milieu. Probable discharge is to be determined by MD    Electronically signed by MASSIEL Warren CNP on 6/20/2022 at 3:41 PM    **This report has been created using voice recognition software. It may contain minor errors which are inherent in voice recognition technology. **  I independently saw and evaluated the patient. I reviewed the  documentation above. Any additional comments or changes to the   documentation are stated below otherwise agree with assessment. The patient is known to me from his previous admission. The patient has been complaining of a rash and is showing medication seeking behavior. He is asking for intramuscular injection of Benadryl. The patient has also received 1 injection since his admission. He has a history of taking antihistamines intramuscularly. We discussed that he can have oral cetirizine. The patient wants to go for rehab and is likely to be discharged tomorrow. PLAN  Medications as noted above  Attempt to develop insight  Psycho-education conducted. Estimated Length of Stay is 1-3 days  Supportive Therapy conducted.   Follow-up daily while on inpatient unit    Electronically signed by Lorri Garcia MD on 6/20/22 at 6:14 PM EDT

## 2022-06-20 NOTE — PLAN OF CARE
Problem: Chronic Conditions and Co-morbidities  Goal: Patient's chronic conditions and co-morbidity symptoms are monitored and maintained or improved  6/20/2022 1003 by Jerrica Crisostomo RN  Outcome: Progressing     Problem: Pain  Goal: Verbalizes/displays adequate comfort level or baseline comfort level  6/20/2022 1003 by Jerrica Crisostomo RN  Outcome: Progressing  Flowsheets (Taken 6/20/2022 1003)  Verbalizes/displays adequate comfort level or baseline comfort level:   Encourage patient to monitor pain and request assistance   Assess pain using appropriate pain scale   Administer analgesics based on type and severity of pain and evaluate response  Note: Patient denies pain upon assessment. Will monitor. Problem: Psychosis  Goal: Will report no hallucinations or delusions  Description: INTERVENTIONS:  1. Administer medication as  ordered  2. Assist with reality testing to support increasing orientation  3. Assess if patient's hallucinations or delusions are encouraging self harm or harm to others and intervene as appropriate  6/20/2022 1003 by Jerrica Crisostomo RN  Outcome: Progressing  Note: Patient alert and orient x 4. Speech clear. Patient presents this shift with flat affect, makes good eye contact, and engages in 1:1 talk. Patient reports is feeling better than when admitted. Patient would like to go to rehab after discharged. Patient denies suicidal/homicidal/self harm ideations. Patient denies hallucinations and does not appear to be responding to internal stimuli. Patient reports sleep was good this past night and appetite is good. Patient is compliant with medications and in behavioral control. Patient is out in day area and social with peers. Patient is tending to activities of daily living. Support provided. Safety maintained with every 15 minute checks and as needed. Problem: Anxiety  Goal: Will report anxiety at manageable levels  Description: INTERVENTIONS:  1.  Administer medication as ordered  2. Teach and rehearse alternative coping skills  3. Provide emotional support with 1:1 interaction with staff  6/20/2022 1003 by Gio Stephens RN  Outcome: Progressing  Flowsheets (Taken 6/20/2022 1003)  Will report anxiety at manageable levels:   Administer medication as ordered   Teach and rehearse alternative coping skills   Provide emotional support with 1:1 interaction with staff  6/19/2022 2053 by Blas Vizcarra RN  Flowsheets (Taken 6/19/2022 1943)  Will report anxiety at manageable levels: Administer medication as ordered  Note: Patient reports anxiety at manageable levels.

## 2022-06-20 NOTE — GROUP NOTE
Group Therapy Note    Date: 6/20/2022    Group Start Time: 1100  Group End Time: 5718  Group Topic: Psychoeducation    TIESHA Isbell, CTRS    Pt did not attend 1100 psychoeducation group d/t resting in room despite staff invitation to attend. 1:1 talk time offered as alternative to group session, pt declined.            Signature:  Darin Holguin

## 2022-06-20 NOTE — GROUP NOTE
Group Therapy Note    Date: 6/20/2022    Group Start Time: 0900  Group End Time: 0254  Group Topic: Healthy Living/Wellness    TIESHA CAMPOS    Jonas Rodriguez RN        Group Therapy Note    Attendees: 10 out of 21. Patient's Goal:  Participation in group. Notes:      Status After Intervention:  Improved    Participation Level:  Active Listener    Participation Quality: Appropriate      Speech:  normal      Thought Process/Content: Logical      Affective Functioning: Congruent      Mood: stable      Level of consciousness:  Alert and Oriented x4      Response to Learning: Able to verbalize current knowledge/experience      Endings: None Reported    Modes of Intervention: Problem-solving      Discipline Responsible: Registered Nurse      Signature:  Jonas Rodriguez RN

## 2022-06-20 NOTE — PROGRESS NOTES
Comprehensive Nutrition Assessment    Type and Reason for Visit:  Initial,Consult (wt loss)    Nutrition Recommendations/Plan:   1. Will continue to provide Regular diet and Ensure High Protein on all trays     Malnutrition Assessment:  Malnutrition Status:  Severe malnutrition (06/20/22 1335)    Context:  Chronic Illness     Findings of the 6 clinical characteristics of malnutrition:  Energy Intake:  75% or less estimated energy requirements for 1 month or longer  Weight Loss:  10% over 6 months     Body Fat Loss:  Unable to assess     Muscle Mass Loss:  Unable to assess    Fluid Accumulation:  Unable to assess     Strength:  Not Performed    Nutrition Assessment:    Pt was admitted to [facility] for mental Health reasons. Per documented wts, pt has lost approximately 47# over the past 6 months. Pt is now consuming more than 75% of Regular diet with supplements. Nutrition Related Findings:    no edema, no labs availabe, Meds: Reviewed, PMH: PUD, Pre-DM, polysustance abuse Wound Type: None       Current Nutrition Intake & Therapies:    Average Meal Intake: %  Average Supplements Intake: %  ADULT DIET; Regular  ADULT ORAL NUTRITION SUPPLEMENT; Breakfast, Dinner; Standard High Calorie/High Protein Oral Supplement    Anthropometric Measures:  Height: 5' 7\" (170.2 cm)  Ideal Body Weight (IBW): 148 lbs (67 kg)    Admission Body Weight: 188 lb (85.3 kg)  Current Body Weight: 188 lb (85.3 kg),   IBW. Weight Source: Standing Scale  Current BMI (kg/m2): 29.4  Usual Body Weight: 235 lb (106.6 kg) (1/22)  % Weight Change (Calculated): -20                    BMI Categories: Overweight (BMI 25.0-29. 9)    Estimated Daily Nutrient Needs:  Energy Requirements Based On: Formula  Weight Used for Energy Requirements: Admission  Energy (kcal/day): 2100 kcal ([place] x 1.2)  Weight Used for Protein Requirements: Current  Protein (g/day): 1.2g/kg= 100-105 g       Nutrition Diagnosis:   · Severe malnutrition related to inadequate protein-energy intake as evidenced by Criteria as identified in malnutrition assessment      Nutrition Interventions:   Food and/or Nutrient Delivery: Continue Current Diet,Continue Oral Nutrition Supplement  Nutrition Education/Counseling: No recommendation at this time  Coordination of Nutrition Care: Continue to monitor while inpatient       Goals:     Goals: PO intake 75% or greater       Nutrition Monitoring and Evaluation:      Food/Nutrient Intake Outcomes: Food and Nutrient Intake,Supplement Intake  Physical Signs/Symptoms Outcomes: Biochemical Data,GI Status,Fluid Status or Edema,Skin,Weight    Discharge Planning:    Continue current diet     Gonsalo Whitten, [address] [place] [address],   Contact: [phone]

## 2022-06-20 NOTE — PROGRESS NOTES
[place] [id number] [facility] Medicine    Progress Note     6/20/2022    1:13 PM    Name:   Luiz Varela  MRN:     [id number]     Acct:      [de-identified]   Room:   [address] [place] [address] Day:  2  Admit Date:  6/18/2022 10:36 PM    PCP:   No primary care provider on file. Code Status:  Full Code    Subjective:     C/C: No chief complaint on file. abnormal behavior  Principal Problem:    Acute psychosis ([facility]  Active Problems:    PUD (peptic ulcer disease)    Gastroesophageal reflux disease without esophagitis    Prediabetes  Resolved Problems:    * No resolved hospital problems. *            Luiz Varela is a 32 y.o. Non- / non  male who presents with No chief complaint on file. and is admitted to the hospital for the management of Acute psychosis ([facility] 77-year-old gentleman who has extensive history of gastritis peptic ulcer disease and GERD multiple EGDs in the past denies any nausea vomiting or any chest pain  Patient history of prediabetes A1c has been 5.8        77-year-old gentleman with extensive history of peptic ulcer disease gastritis and GERD  Avoid NSAIDs start Protonix 40 daily  Prediabetes A1c 5.8 advised for diet exercise weight loss no indications for metformin at this time             Significant last 24 hr data reviewed ;   Vitals:    06/18/22 2248 06/18/22 2307 06/19/22 0800 06/19/22 2045   BP: 132/79  113/65 (!) 138/92   Pulse: 81  62 92   Resp: 16  14 14   Temp: 98.2 °F (36.8 °C)  98 °F (36.7 °C) 98 °F (36.7 °C)   TempSrc: Oral  Oral Oral   Weight:  188 lb 8 oz (85.5 kg)     Height:  5' 7\" (1.702 m)        No results found for this or any previous visit (from the past 24 hour(s)). No results for input(s): POCGLU in the last 72 hours. No results found.           On admission         Review of Systems:     Constitutional:  negative for chills, fevers, sweats  Respiratory:  negative for cough, dyspnea on exertion, hemoptysis, shortness of breath, wheezing  Cardiovascular:  negative for chest pain, chest pressure/discomfort, lower extremity edema, palpitations  Gastrointestinal:  negative for abdominal pain, constipation, diarrhea, nausea, vomiting  Neurological:  negative for dizziness, headache  Data:     Past Medical History:  no change     Social History:  no change    Family History: @no change    Vitals:      I/O (24Hr): No intake or output data in the 24 hours ending 06/20/22 1313    Labs:    URINE ANALYSIS: No results found for: LABURIN     CBC:  Lab Results   Component Value Date    WBC 10.9 05/24/2022    HGB 13.7 05/24/2022     05/24/2022        BMP:    Lab Results   Component Value Date     05/05/2022    K 3.6 05/05/2022    K 3.9 09/12/2021     05/05/2022    CO2 30 05/05/2022    BUN 8 05/05/2022    CREATININE 0.80 05/05/2022    GLUCOSE 63 05/05/2022    GLUCOSE 99 03/01/2021      LIVER PROFILE:  Lab Results   Component Value Date    ALT 17 05/05/2022    AST 28 05/05/2022    PROT 7.0 05/05/2022    PROT 7.5 02/27/2018    BILITOT <0.10 05/05/2022    BILIDIR <0.08 01/07/2022    LABALBU 4.1 05/05/2022    LABALBU 4.3 02/27/2018            Radiology:    Medications:      Allergies:      Current Meds:   Scheduled Meds:    ARIPiprazole ER  400 mg IntraMUSCular Once    buprenorphine-naloxone  1.5 Film SubLINGual Daily    ARIPiprazole  15 mg Oral Daily    pantoprazole  40 mg Oral QAM AC     Continuous Infusions:   PRN Meds: OLANZapine zydis, OLANZapine (ZyPREXA) in sterile water IntraMUSCular, acetaminophen, aluminum & magnesium hydroxide-simethicone, nicotine polacrilex, polyethylene glycol, traZODone      Physical Examination:        Vitals:    06/18/22 2248 06/18/22 2307 06/19/22 0800 06/19/22 2045   BP: 132/79  113/65 (!) 138/92   Pulse: 81  62 92   Resp: 16  14 14   Temp: 98.2 °F (36.8 °C)  98 °F (36.7 °C) 98 °F (36.7 °C)   TempSrc: Oral  Oral Oral   Weight:  188 lb 8 oz (85.5 kg)     Height:  5' 7\" (1.702 m)         No results for input(s): POCGLU in the last 72 hours. No intake or output data in the 24 hours ending 06/20/22 1313    General Appearance:  alert, well appearing, and in no acute distress  Mental status:   Head:  normocephalic, atraumatic. Eye: no icterus, redness, pupils equal and reactive, extraocular eye movements intact, conjunctiva clear  Ear: normal external ear, no discharge, hearing intact  Nose:  no drainage noted  Mouth: mucous membranes moist  Neck: supple, no carotid bruits, thyroid not palpable  Lungs: Bilateral equal air entry, clear to ausculation, no wheezing, rales or rhonchi, normal effort  Cardiovascular: normal rate, regular rhythm, no murmur, gallop, rub. Abdomen: Soft, nontender, nondistended, normal bowel sounds, no hepatomegaly or splenomegaly  Neurologic: There are no new focal motor or sensory deficits,   Skin: No gross lesions, rashes, bruising or bleeding on exposed skin area  Extremities:  peripheral pulses palpable, no pedal edema or calf pain with palpation  Psych:             Assessment:        Primary Problem  Acute psychosis ([address]    Principal Problem:    Acute psychosis ([address]  Active Problems:    PUD (peptic ulcer disease)    Gastroesophageal reflux disease without esophagitis    Prediabetes  Resolved Problems:    * No resolved hospital problems. *       Plan:          6/20/22    Vitals stable . Labs ok   . Hospital Problems           Last Modified POA    * (Principal) Acute psychosis ([address] 6/18/2022 Yes    PUD (peptic ulcer disease) 6/20/2022 Yes    Gastroesophageal reflux disease without esophagitis 6/20/2022 Yes    Prediabetes 6/20/2022 Yes           Medications: Allergies:     Allergies   Allergen Reactions    Dicyclomine Anaphylaxis    Famotidine Anaphylaxis    Geodon [Ziprasidone Hcl] Anaphylaxis    Haloperidol Anaphylaxis     Other reaction(s): throat swells  Throat swells    Other reaction(s): AOF, throat swells    Iv Dye [Iodides] Nausea And Vomiting and Rash     Ok to take with benadryl - itching only no rash or anaphylaxis  Needs benadryl.  Reglan [Metoclopramide] Anaphylaxis     Pt states is an allergy    Ibuprofen      Other reaction(s): GI Intolerance  Other reaction(s): AOF, GI Intolerance    Aspirin      Pressure in skin    Dicyclomine Hcl      08--allergy removed-pt sts today it is his allergy  Other reaction(s): Itching    Hydroxyzine Hcl      Reports throat swelling    Iodine      Other reaction(s): Hives, Itching, Nausea/Vomiting    Metronidazole Swelling and Other (See Comments)     Patient says he isn't allergic to this med 08-  Today 08- it is an allergy  02/13/19 states he is not allergic   Other reaction(s): GI Intolerance, Unknown by Patient    Mirtazapine     Promethazine Other (See Comments)     Shows medication seeking behavior. Likely to worsen symptoms (vomiting) to allow for Promethazine to be prescribed.  Ziprasidone Other (See Comments)     Other reaction(s): Hallucinations       Current Meds:   Scheduled Meds:    cetirizine  10 mg Oral Daily    buprenorphine-naloxone  1.5 Film SubLINGual Daily    ARIPiprazole  15 mg Oral Daily    pantoprazole  40 mg Oral QAM AC     Continuous Infusions:   PRN Meds: OLANZapine zydis, OLANZapine (ZyPREXA) in sterile water IntraMUSCular, acetaminophen, aluminum & magnesium hydroxide-simethicone, nicotine polacrilex, polyethylene glycol, traZODone                    Thanks for consulting us . Will monitor vitals and clinical course , and  Optimize therapy  as needed .            Rosamaria Bennett MD

## 2022-06-20 NOTE — GROUP NOTE
Group Therapy Note    Date: 6/20/2022    Group Start Time: 9638  Group End Time: 5709  Group Topic: Psychotherapy    [facility]    Fuentes Hernandez        Group Therapy Note             Patient refused to attend psychotherapy group after encouragement from staff. 1:1 talk time offered but refused. Signature:   Fuentes Hernandez

## 2022-06-20 NOTE — GROUP NOTE
Group Therapy Note    Date: 6/20/2022    Group Start Time: 1430  Group End Time: 9744  Group Topic: Cognitive Skills    DIANE Box    Pt did not attend 1430 cognitive skills group d/t resting in room despite staff invitation to attend. 1:1 talk time offered as alternative to group session, pt declined.        Signature:  Thomas Al

## 2022-06-20 NOTE — PLAN OF CARE
[facility]  Day 3 Interdisciplinary Treatment Plan NOTE    Review Date & Time: 6/20/2022   1312    Admission Type:   Admission Type:  Involuntary    Reason for admission:  Reason for Admission: delusional/bizarre behavior  Estimated Length of Stay: 5-7 days  Estimated Discharge Date Update: to be determined by physician    PATIENT STRENGTHS:  Patient Strengths    Patient Strengths and Limitations:Limitations: Multiple barriers to leisure interests,Tendency to isolate self,Inappropriate/potentially harmful leisure interests,Difficult relationships / poor social skills,Difficulty problem solving/relies on others to help solve problems,Unrealistic self-view,Perceives need for assistance with self-care  Addictive Behavior:Addictive Behavior  In the Past 3 Months, Have You Felt or Has Someone Told You That You Have a Problem With  : None  Medical Problems:  Past Medical History:   Diagnosis Date    Anxiety     Arthritis     Asthma     Bipolar I disorder, most recent episode (or current) depressed, unspecified 9/12/2014    Clostridium difficile infection     COPD (chronic obstructive pulmonary disease) ([address]     Depression     Disease of blood and blood forming organ     Eczema     Fracture, metacarpal     R 4th and 5th    Gastric ulcer     Gastritis 06/13/2018    GERD (gastroesophageal reflux disease)     GI bleed     H. 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     UTI (urinary tract infection)        Risk:  Fall Risk   Ross Scale Ross Scale Score: 22  BVC    Change in scores no Changes to plan of Care no    Status EXAM:   Mental Status and Behavioral Exam  Normal: No  Level of Assistance: Independent/Self  Facial Expression: Flat  Affect: Appropriate  Level of Consciousness: Alert  Frequency of Checks: 4 times per hour, close  Mood:Normal: No  Mood: Anxious  Motor Activity:Normal: Yes  Motor Activity: Decreased  Eye Contact: Good  Observed Behavior: Cooperative,Preoccupied  Sexual Misconduct History: Current - no  Preception: [place] to person,[place] to time,[place] to place,[place] to situation  Attention:Normal: No  Attention: Distractible  Thought Processes: Circumstantial  Thought Content:Normal: No  Thought Content: Preoccupations  Depression Symptoms: Impaired concentration  Anxiety Symptoms: Generalized  Elizabeth Symptoms: No problems reported or observed. Hallucinations: None  Delusions: No  Delusions: Paranoid  Memory:Normal: No  Memory: Poor recent  Insight and Judgment: No  Insight and Judgment: Poor insight    Daily Assessment Last Entry:   Daily Sleep (WDL): Within Defined Limits            Daily Nutrition (WDL): Within Defined Limits  Level of Assistance: Independent/Self    Patient Monitoring:  Frequency of Checks: 4 times per hour, close    Psychiatric Symptoms:   Depression Symptoms  Depression Symptoms: Impaired concentration  Anxiety Symptoms  Anxiety Symptoms: Generalized  Elizabeth Symptoms  Elizabeth Symptoms: No problems reported or observed. Suicide Risk CSSR-S:  1) Within the past month, have you wished you were dead or wished you could go to sleep and not wake up? : No  2) Have you actually had any thoughts of killing yourself? : No  3) Have you been thinking about how you might kill yourself? : No  5) Have you started to work out or worked out the details of how to kill yourself?  Do you intend to carry out this plan? : No  6) Have you ever done anything, started to do anything, or prepared to do anything to end your life?: Yes  Change in Result no Change in Plan of care  no      EDUCATION:   EDUCATION:   Learner Progress Toward Treatment Goals: Reviewed results and recommendations of this team, Reviewed group plan and strategies, Reviewed signs, symptoms and risk of self harm and violent behavior, Reviewed goals and plan of care    Method:small group, individual verbal education    Outcome:verbalized by patient, but needs reinforcement to obtain goals    PATIENT GOALS:  Short term: med stabilization   Long term: follow up with outpatient provider    PLAN/TREATMENT RECOMMENDATIONS UPDATE: continue with group therapies, increased socialization, continue planning for after discharge goals, continue with medication compliance    SHORT-TERM GOALS UPDATE:   Time frame for Short-Term Goals: 5-7 days    LONG-TERM GOALS UPDATE:   Time frame for Long-Term Goals: 6 months  Members Present in Team Meeting: See Signature Sheet    BRIANNA Martin

## 2022-06-21 VITALS
DIASTOLIC BLOOD PRESSURE: 104 MMHG | WEIGHT: 188.5 LBS | BODY MASS INDEX: 29.58 KG/M2 | HEIGHT: 67 IN | RESPIRATION RATE: 14 BRPM | HEART RATE: 97 BPM | TEMPERATURE: 97.8 F | SYSTOLIC BLOOD PRESSURE: 148 MMHG

## 2022-06-21 PROCEDURE — 99239 HOSP IP/OBS DSCHRG MGMT >30: CPT | Performed by: PSYCHIATRY & NEUROLOGY

## 2022-06-21 PROCEDURE — 6370000000 HC RX 637 (ALT 250 FOR IP): Performed by: INTERNAL MEDICINE

## 2022-06-21 PROCEDURE — 6370000000 HC RX 637 (ALT 250 FOR IP): Performed by: PSYCHIATRY & NEUROLOGY

## 2022-06-21 PROCEDURE — 6370000000 HC RX 637 (ALT 250 FOR IP)

## 2022-06-21 RX ORDER — BUPRENORPHINE AND NALOXONE 8; 2 MG/1; MG/1
1.5 FILM, SOLUBLE BUCCAL; SUBLINGUAL DAILY
Qty: 9 FILM | Refills: 0 | Status: SHIPPED | OUTPATIENT
Start: 2022-06-22 | End: 2022-06-28

## 2022-06-21 RX ORDER — CETIRIZINE HYDROCHLORIDE 10 MG/1
10 TABLET ORAL DAILY
Qty: 30 TABLET | Refills: 0 | Status: SHIPPED | OUTPATIENT
Start: 2022-06-22

## 2022-06-21 RX ORDER — NALOXONE HYDROCHLORIDE 4 MG/.1ML
1 SPRAY NASAL PRN
Qty: 1 EACH | Refills: 0 | Status: SHIPPED | OUTPATIENT
Start: 2022-06-21 | End: 2022-07-14

## 2022-06-21 RX ORDER — ARIPIPRAZOLE 15 MG/1
15 TABLET ORAL DAILY
Qty: 30 TABLET | Refills: 3 | Status: SHIPPED | OUTPATIENT
Start: 2022-06-22

## 2022-06-21 RX ADMIN — NICOTINE POLACRILEX 2 MG: 2 GUM, CHEWING BUCCAL at 06:17

## 2022-06-21 RX ADMIN — NICOTINE POLACRILEX 2 MG: 2 GUM, CHEWING BUCCAL at 08:06

## 2022-06-21 RX ADMIN — BUPRENORPHINE AND NALOXONE 1.5 FILM: 8; 2 FILM BUCCAL; SUBLINGUAL at 08:02

## 2022-06-21 RX ADMIN — PANTOPRAZOLE SODIUM 40 MG: 40 TABLET, DELAYED RELEASE ORAL at 07:59

## 2022-06-21 ASSESSMENT — PAIN SCALES - GENERAL: PAINLEVEL_OUTOF10: 0

## 2022-06-21 ASSESSMENT — PAIN SCALES - WONG BAKER: WONGBAKER_NUMERICALRESPONSE: 0

## 2022-06-21 NOTE — GROUP NOTE
Group Therapy Note    Date: 6/21/2022    Group Start Time: 1000  Group End Time: 9003  Group Topic: Psychotherapy    [facility]    Batsheva Mcfarlane        Group Therapy Note           Patient refused to attend psychotherapy group after encouragement from staff. 1:1 talk time offered but refused. Signature:   Batsheva Mcfarlane

## 2022-06-21 NOTE — PLAN OF CARE
Problem: Chronic Conditions and Co-morbidities  Goal: Patient's chronic conditions and co-morbidity symptoms are monitored and maintained or improved  6/20/2022 2027 by Kwesi Trimble LPN  Outcome: Progressing     Problem: Pain  Goal: Verbalizes/displays adequate comfort level or baseline comfort level  6/20/2022 2027 by Kwesi Trimble LPN  Outcome: Progressing  Note: Patient asked if experiencing any pain and he is denying pain at this time. Patient cooperative with questioning. Problem: Psychosis  Goal: Will report no hallucinations or delusions  Description: INTERVENTIONS:  1. Administer medication as  ordered  2. Assist with reality testing to support increasing orientation  3. Assess if patient's hallucinations or delusions are encouraging self harm or harm to others and intervene as appropriate  6/20/2022 2027 by Kwesi Trimble LPN  Outcome: Progressing     Problem: Anxiety  Goal: Will report anxiety at manageable levels  Description: INTERVENTIONS:  1. Administer medication as ordered  2. Teach and rehearse alternative coping skills  3.  Provide emotional support with 1:1 interaction with staff  6/20/2022 2027 by Kwesi Trimble LPN  Outcome: Progressing

## 2022-06-21 NOTE — GROUP NOTE
HS Group     Date: June 20, 2022     Patient did not participate in HS group. 1:1 talk time was offered as an alternative. Will continue to encourage patient to participate in unit programming.      Signature: GABRIELLE Hurt

## 2022-06-21 NOTE — PROGRESS NOTES
CLINICAL PHARMACY NOTE: MEDS TO BEDS    Total # of Prescriptions Filled: 3   The following medications were delivered to the patient:  · Aripiprazole 15mg  · Naloxone HCL 4mg  · Cetirizine HCL 10mg    Additional Documentation:  Delivered medications to nurses station

## 2022-06-21 NOTE — CARE COORDINATION
DISCHARGE UPDATE:  - Pt reports he is confirmed to go to [facility] on Monday, June 21 and writer sends fax on clinical information required. - On this date, writer calls and leaves a voicemail about Pt getting discharged today and SW needs to confirm he is able to get into their program.  SW receives a call and Pt unable to go due to being on Suboxone. - Pt now wants to go and get his prescription at [facility] on [address] [place], and then will go to [place] [id number].

## 2022-06-21 NOTE — DISCHARGE SUMMARY
DISCHARGE SUMMARY      Patient ID:  Aniceto Martinez  [id number]  66 y.o.  1990    Admit date: 6/18/2022    Discharge date and time: 6/21/2022    Disposition: Home     Admitting Physician: Sherry Olson MD     Discharge Physician: Dr Avelina Disla MD    Admission Diagnoses: Acute psychosis [facility]) [F23]    Admission Condition: poor    Discharged Condition: stable    Admission Circumstance: Aniceto Martinez is a 32 y.o. male who has a past medical history of depression, suicidal ideation, opiate dependence, sleep apnea, asthma, IBS. Patient presented to the ED after he was picked up by [facility] police when he was found naked and crying in the street. At that time patient reports that he bought a drink off of [employer] that was from \"1.5 billion AC\" that caused his bloodstream to be poisoned.     Patient has had multiple inpatient psychiatric hospitalizations. Most recent to [facility] on 5/28/2022 to 5/31/2022. At that time patient was discharged on Effexor and Suboxone. Patient reports that he has not been compliant with medications in the community apart from Suboxone. Patient was prescribed Suboxone 8-2 mg last filled on 6/16, 11 films for 7 days. Patient endorses multiple allergies to antipsychotic medication. Risperdal 0.5 twice daily has been started at this time and patient states he will maintain medication compliance in the hospital and after discharge.     When approached for interview patient states that he was walking in the street trying to get help. Patient reports paranoia that people are chasing him and he has thoughts that the world was going to end. Patient states he just wanted to make it home to his wife and daughter. Throughout interview patient was actively responding and attending to internal stimuli. Nursing staff reports patient has been loudly responding to internal stimuli in his room. Patient presented with poor concentration and slow delayed responses.   Patient currently endorses auditory hallucinations telling him that people are stealing his stuff. Patient denies visual hallucinations. Patient made multiple delusional statements throughout interview does not appear to be oriented to situation in the hospital.  Patient currently presents with inability to care for self and needs constant redirection to remain properly clothed on the unit. At this time, the patient is not able to contract for safety outside the hospital and is not appropriate for a lower level of care. There is risk of decompensation and patient warrants further hospitalization for safety and stabilization.     Patient is currently denying any depression however endorses a history of. Patient denies any current suicidal ideation. Patient denies anxiety. Patient was unable to clearly communicate symptoms of lacey at time of interview. Per chart patient has a history of bipolar symptoms. When discussing alcohol consumption patient denies current use however endorses history of. When discussing illicit drug use patient was unable to clearly communicate. Patient reports he does not believe he has been doing drugs. Patient has a history of cocaine, marijuana and fentanyl use.   UDS pending upon admission.         PAST MEDICAL/PSYCHIATRIC HISTORY:   Past Medical History:   Diagnosis Date    Anxiety     Arthritis     Asthma     Bipolar I disorder, most recent episode (or current) depressed, unspecified 9/12/2014    Clostridium difficile infection     COPD (chronic obstructive pulmonary disease) ([address]     Depression     Disease of blood and blood forming organ     Eczema     Fracture, metacarpal     R 4th and 5th    Gastric ulcer     Gastritis 06/13/2018    GERD (gastroesophageal reflux disease)     GI bleed     H. 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[facility]     Substance abuse ([facility]     Hx of opiates, benzos, cocaine, alcohol abuse    Suicidal ideation     Suicide attempt by hanging ([facility]     Tobacco dependence     Ulcerative colitis ([facility]     UTI (urinary tract infection)        FAMILY/SOCIAL HISTORY:  Family History   Problem Relation Age of Onset    Diabetes Father     Alcohol Abuse Father     Depression Father     Arthritis Father     High Blood Pressure Father     Other Father         aneurysm & epilepsy    Migraines Father     Arthritis Mother     Other Mother         aneurysm & epilepsy    Migraines Mother     Diabetes Brother         Aunt and uncles    Depression Brother     Mental Illness Brother     Other Brother         epilepsy    Migraines Brother     Stroke Other         Uncle    Other Brother         murdered Oct 6th, 2014    Colon Cancer Paternal Cousin 43    Other Sister         epilepsy    Migraines Sister      Social History     Socioeconomic History    Marital status: Single     Spouse name: Not on file    Number of children: Not on file    Years of education: Not on file    Highest education level: Not on file   Occupational History    Occupation: disability   Tobacco Use    Smoking status: Current Every Day Smoker     Packs/day: 0.50     Years: 15.00     Pack years: 7.50     Types: Cigarettes    Smokeless tobacco: Never Used   Vaping Use    Vaping Use: Former   Substance and Sexual Activity    Alcohol use: Not Currently     Comment: drinks daily    Drug use: Yes     Types: Opiates , Cocaine     Comment: Hx of opiates, benzos, cocaine, alcohol abuse    Sexual activity: Yes     Partners: Female     Comment: Lives alone, not working.    Other Topics Concern    Not on file   Social History Narrative    ** Merged History Encounter **          Social Determinants of Health     Financial Resource Strain: Medium Risk    Difficulty of Paying Living Expenses: Somewhat hard   Food Insecurity: No Food Insecurity    Worried About Running Out of Food in the Last Year: Never true    Micaela of Food in the Last Year: Never true   Transportation Needs: No Transportation Needs    Lack of Transportation (Medical): No    Lack of Transportation (Non-Medical):  No   Physical Activity: Inactive    Days of Exercise per Week: 0 days    Minutes of Exercise per Session: 0 min   Stress: Stress Concern Present    Feeling of Stress : Rather much   Social Connections: Socially Isolated    Frequency of Communication with Friends and Family: Never    Frequency of Social Gatherings with Friends and Family: Never    Attends Mandaen Services: Never    Active Member of Clubs or Organizations: No    Attends Club or Organization Meetings: Never    Marital Status: Never    Intimate Partner Violence: Not At Risk    Fear of Current or Ex-Partner: No    Emotionally Abused: No    Physically Abused: No    Sexually Abused: No   Housing Stability: Low Risk     Unable to Pay for Housing in the Last Year: No    Number of [place] in the Last Year: 1    Unstable Housing in the Last Year: No       MEDICATIONS:    Current Facility-Administered Medications:     cetirizine (ZYRTEC) tablet 10 mg, 10 mg, Oral, Daily, Usman Kelsey MD, 10 mg at 06/20/22 1536    buprenorphine-naloxone (SUBOXONE) 8-2 MG SL film 1.5 Film, 1.5 Film, SubLINGual, Daily, Karel Cancilla, APRN - CNP, 1.5 Film at 06/21/22 0802    OLANZapine zydis (ZYPREXA) disintegrating tablet 5 mg, 5 mg, Oral, Q4H PRN, Karel Cancilla, APRN - CNP    OLANZapine (ZYPREXA) 5 mg in sterile water 1 mL injection, 5 mg, IntraMUSCular, Q4H PRN, Karel Cancilla, APRN - CNP    ARIPiprazole (ABILIFY) tablet 15 mg, 15 mg, Oral, Daily, Karel Cancilla, APRN - CNP, 15 mg at 06/20/22 8055    pantoprazole (PROTONIX) tablet 40 mg, 40 mg, Oral, QAM AC, Viktor Miner MD, 40 mg at 06/21/22 0759    acetaminophen (TYLENOL) tablet 650 mg, 650 mg, Oral, Q4H PRN, Regino Aase, MD, 650 mg at 06/18/22 2248    aluminum & magnesium hydroxide-simethicone (MAALOX) 200-200-20 MG/5ML suspension 30 mL, 30 mL, Oral, Q6H PRN, Regino Aase, MD    nicotine polacrilex (NICORETTE) gum 2 mg, 2 mg, Oral, PRN, Regino Aase, MD, 2 mg at 06/21/22 0806    polyethylene glycol (GLYCOLAX) packet 17 g, 17 g, Oral, Daily PRN, Regino Aase, MD    traZODone (DESYREL) tablet 50 mg, 50 mg, Oral, Nightly PRN, Regino Aase, MD, 50 mg at 06/20/22 2204    Examination:  BP (!) 148/104   Pulse 97   Temp 98 °F (36.7 °C)   Resp 14   Ht 5' 7\" (1.702 m)   Wt 188 lb 8 oz (85.5 kg)   BMI 29.52 kg/m²   Gait - steady    HOSPITAL COURSE[de-identified]  Following admission to the hospital, patient had a complete physical exam and blood work up. The patient was referred to Internal Medicine. Patient was monitored closely with suicide precaution  Patient was started on Abilify. He received GoHomeer in the hospital on  6/20/2022  Was encouraged to participate in group and other milieu activity  Patient started to feel better with this combination of treatment. Significant progress in the symptoms since admission. Mood is improved  The patient denies AVH or paranoid thoughts  The patient denies any hopelessness or worthlessness  No active SI/HI  Appetite:  [x] Normal  [] Increased  [] Decreased    Sleep:       [x] Normal  [] Fair       [] Poor            Energy:    [x] Normal  [] Increased  [] Decreased     SI [] Present  [x] Absent  HI  []Present  [x] Absent   Aggression:  [] yes  [] no  Patient is [x] able  [] unable to CONTRACT FOR SAFETY   Medication side effects(SE):  [x] None(Psych.  Meds.) [] Other      Mental Status Examination on discharge:    Level of consciousness:  within normal limits   Appearance:  well-appearing  Behavior/Motor:  no abnormalities noted  Attitude toward examiner:  attentive and good eye contact  Speech:  spontaneous, normal rate and normal volume   Mood: anxious  Affect:  reactive  Thought processes:  linear, goal directed and coherent   Thought content:  Suicidal Ideation:  denies suicidal ideation  Delusions:  no evidence of delusions  Perceptual Disturbance:  denies any perceptual disturbance  Cognition:  oriented to person, place, and time   Concentration intact  Memory intact  Insight good   Judgement fair   Fund of Knowledge adequate      ASSESSMENT:  Patient symptoms are:  [x] Well controlled  [x] Improving  [] Worsening  [] No change      Diagnosis:  Principal Problem:    Acute psychosis ([facility]  Active Problems:    PUD (peptic ulcer disease)    Gastroesophageal reflux disease without esophagitis    Prediabetes  Resolved Problems:    * No resolved hospital problems. *      LABS:    No results for input(s): WBC, HGB, PLT in the last 72 hours. No results for input(s): NA, K, CL, CO2, BUN, CREATININE, GLUCOSE in the last 72 hours. No results for input(s): BILITOT, ALKPHOS, AST, ALT in the last 72 hours. Lab Results   Component Value Date    LABAMPH NEG 12/27/2021    BARBSCNU NEGATIVE 04/05/2022    LABBENZ NEGATIVE 04/05/2022    LABBENZ NEGATIVE 06/12/2013    LABMETH NEGATIVE 04/05/2022    OPIATESCREENURINE NEG 12/27/2021    PHENCYCLIDINESCREENURINE N/A 12/27/2021    PPXUR NOT REPORTED 01/07/2022     Lab Results   Component Value Date    TSH 0.59 03/28/2018     No results found for: LITHIUM  No results found for: VALPROATE, CBMZ    RISK ASSESSMENT AT DISCHARGE: Low risk for suicide and homicide. Treatment Plan:  Reviewed current Medications with the patient. Education provided on the complaince with treatment. Risks, benefits, side effects, drug-to-drug interactions and alternatives to treatment were discussed.     Encourage patient to attend outpatient follow up appointment and therapy. Patient was advised to call the outpatient provider, visit the nearest ED or call 911 if symptoms are not manageable. Medication List      START taking these medications    ARIPiprazole 15 MG tablet  Commonly known as: ABILIFY  Take 1 tablet by mouth daily  Start taking on: June 22, 2022     buprenorphine-naloxone 8-2 MG Film SL film  Commonly known as: SUBOXONE  Place 1.5 Film under the tongue daily for 6 days. Start taking on: June 22, 2022     cetirizine 10 MG tablet  Commonly known as: ZYRTEC  Take 1 tablet by mouth daily  Start taking on: June 22, 2022     naloxone 4 MG/0.1ML Liqd nasal spray  1 spray by Nasal route as needed for Opioid Reversal        STOP taking these medications    acetaminophen 500 MG tablet  Commonly known as: TYLENOL     loratadine 10 MG tablet  Commonly known as: Claritin     meloxicam 7.5 MG tablet  Commonly known as: Mobic     venlafaxine 37.5 MG extended release capsule  Commonly known as: EFFEXOR XR           Where to Get Your Medications      These medications were sent to [facility] [address], [address] [facility] [address]    Phone: [phone]   · ARIPiprazole 15 MG tablet  · cetirizine 10 MG tablet  · naloxone 4 MG/0.1ML Liqd nasal spray     You can get these medications from any pharmacy    Bring a paper prescription for each of these medications  · buprenorphine-naloxone 8-2 MG Film SL film               Core Measures statement:   Not applicable      TIME SPENT - 35 MINUTES TO COMPLETE THE EVALUATION, DISCHARGE SUMMARY, MEDICATION RECONCILIATION AND FOLLOW UP CARE                                         Kallie Gray is a 32 y.o. male being evaluated Suzy Holland MD on 6/21/2022 at 11:26 AM    An electronic signature was used to authenticate this note. **This report has been created using voice recognition software.  It may contain minor errors which are inherent in voice recognition technology. **

## 2022-06-21 NOTE — BH NOTE
Patient given tobacco quitline number 48248608833 at this time, refusing to call at this time, states \" I just dont want to quit now\"- patient given information as to the dangers of long term tobacco use. Continue to reinforce the importance of tobacco cessation.

## 2022-06-21 NOTE — CARE COORDINATION
Name: Kareem Byers    : 1990    Discharge Date:   Primary Auth/Cert #:[id number]    Destination: Patient was discharged to [facility] via [employer] [facility]  Discharge Medications:      Medication List      START taking these medications    ARIPiprazole 15 MG tablet  Commonly known as: ABILIFY  Take 1 tablet by mouth daily  Start taking on: 2022  Notes to patient: Clear thought     buprenorphine-naloxone 8-2 MG Film SL film  Commonly known as: SUBOXONE  Place 1.5 Film under the tongue daily for 6 days.   Start taking on: 2022  Notes to patient: Reduce urges for opiates     cetirizine 10 MG tablet  Commonly known as: ZYRTEC  Take 1 tablet by mouth daily  Start taking on: 2022  Notes to patient: Allergies     naloxone 4 MG/0.1ML Liqd nasal spray  1 spray by Nasal route as needed for Opioid Reversal  Notes to patient: Prevent OD         STOP taking these medications    acetaminophen 500 MG tablet  Commonly known as: TYLENOL     loratadine 10 MG tablet  Commonly known as: Claritin     meloxicam 7.5 MG tablet  Commonly known as: Mobic     venlafaxine 37.5 MG extended release capsule  Commonly known as: EFFEXOR XR           Where to Get Your Medications      These medications were sent to [facility] [address] [address]    Phone: [phone]   · ARIPiprazole 15 MG tablet  · cetirizine 10 MG tablet  · naloxone 4 MG/0.1ML Liqd nasal spray     You can get these medications from any pharmacy    Bring a paper prescription for each of these medications  · buprenorphine-naloxone 8-2 MG Film SL film         Follow Up Appointment: [address] Shannan Reese [facility]  Phone: [phone]  Fax: [phone]    Go on 2022  You have an appointment on  at 10:30 [facility]         Go on 6/21/2022  Please send by [facility].   Marco Antonio Franco will  prescription and then go to [facility], [address] [place] [address] on 6/21/2022  Once Marco Antonio Franco picks up his medications, he will then go to [place] [address]

## 2022-06-21 NOTE — BH NOTE
[facility]  Discharge Note    Pt discharged with followings belongings:   Dental Appliances: None  Vision - Corrective Lenses: None  Hearing Aid: None  Jewelry: None  Body Piercings Removed: N/A  Clothing: Undergarments  Other Valuables: Other (Comment) (none)   Valuables sent home with patient or returned to patient. Patient education on aftercare instructions: yes  Information faxed to [facility] by staff  at 1:32 PM .Patient verbalize understanding of AVS:  yes. Status EXAM upon discharge:  Mental Status and Behavioral Exam  Normal: No  Level of Assistance: Independent/Self  Facial Expression: Brightened,Exaggerated  Affect: Unstable  Level of Consciousness: Alert  Frequency of Checks: 4 times per hour, close  Mood:Normal: No  Mood: Suspicious,Ambivalent  Motor Activity:Normal: No  Motor Activity: Increased  Eye Contact: Fair  Observed Behavior: Guarded,Impulsive  Sexual Misconduct History: Current - no  Preception: [place] to person,[place] to time  Attention:Normal: No  Attention: Unable to concentrate  Thought Processes: Circumstantial  Thought Content:Normal: No  Thought Content: Paranoia  Depression Symptoms: Impaired concentration  Anxiety Symptoms: Generalized  Elizabeth Symptoms: Poor judgment  Hallucinations:  Other (comment) (pt denies but responding to internal stimuli)  Delusions: No  Delusions: Paranoid  Memory:Normal: No  Memory: Confabulation  Insight and Judgment: No  Insight and Judgment: Poor judgment,Poor insight      Metabolic Screening:    Lab Results   Component Value Date    LABA1C 5.7 10/23/2014       Lab Results   Component Value Date    CHOL 205 (H) 02/21/2017    CHOL 158 11/09/2015    CHOL 152 10/23/2014    CHOL 206 (H) 03/05/2014    CHOL 205 (H) 01/22/2014    CHOL 208 (H) 08/28/2013     Lab Results   Component Value Date    TRIG 189 (H) 02/21/2017    TRIG 223 (H) 11/09/2015    TRIG 52 10/23/2014    TRIG 104 03/05/2014    TRIG 74 01/22/2014    TRIG 104 08/28/2013     Lab Results Component Value Date    HDL 44 02/21/2017    HDL 40 (L) 11/09/2015    HDL 55 10/23/2014    HDL 54 03/05/2014    HDL 53 01/22/2014    HDL 72 (H) 08/28/2013     No components found for: LDLCAL  No results found for: LABVLDL  All belongings were sent with patient. Patient was discharged to [address] [place] [address] via [employer].   Lillian Lees LPN

## 2022-06-21 NOTE — GROUP NOTE
Group Therapy Note    Date: 6/21/2022    Group Start Time: 1100  Group End Time: 1150  Group Topic: Cognitive Skills    TIESHA Dawson, CTRS        Group Therapy Note    Attendees: 12/19         Pt did not participate in Cognitive Skills Group at 1100am when encouraged by RT due to resting in room. Pt was offered talk time as an alternative to group but declined.          Discipline Responsible: Psychoeducational Specialist        Signature:  Trenton Essex

## 2022-06-22 ENCOUNTER — HOSPITAL ENCOUNTER (EMERGENCY)
Age: 32
Discharge: LEFT BEFORE DISCHARGE | End: 2022-06-22

## 2022-06-22 ENCOUNTER — HOSPITAL ENCOUNTER (EMERGENCY)
Age: 32
Discharge: HOME | End: 2022-06-22
Attending: EMERGENCY MEDICINE

## 2022-06-22 VITALS
HEART RATE: 118 BPM | OXYGEN SATURATION: 98 % | RESPIRATION RATE: 16 BRPM | SYSTOLIC BLOOD PRESSURE: 153 MMHG | DIASTOLIC BLOOD PRESSURE: 96 MMHG

## 2022-06-22 VITALS
TEMPERATURE: 98 F | WEIGHT: 200 LBS | DIASTOLIC BLOOD PRESSURE: 93 MMHG | OXYGEN SATURATION: 93 % | BODY MASS INDEX: 31.39 KG/M2 | SYSTOLIC BLOOD PRESSURE: 132 MMHG | RESPIRATION RATE: 16 BRPM | HEIGHT: 67 IN | HEART RATE: 100 BPM

## 2022-06-22 DIAGNOSIS — Z53.21 ELOPED FROM EMERGENCY DEPARTMENT: Primary | ICD-10-CM

## 2022-06-22 PROCEDURE — 99283 EMERGENCY DEPT VISIT LOW MDM: CPT

## 2022-06-22 ASSESSMENT — PAIN DESCRIPTION - LOCATION: LOCATION: SHOULDER

## 2022-06-22 ASSESSMENT — PAIN - FUNCTIONAL ASSESSMENT: PAIN_FUNCTIONAL_ASSESSMENT: 0-10

## 2022-06-22 ASSESSMENT — PAIN SCALES - GENERAL: PAINLEVEL_OUTOF10: 9

## 2022-06-23 ENCOUNTER — HOSPITAL ENCOUNTER (EMERGENCY)
Age: 32
Discharge: HOME | End: 2022-06-23
Attending: EMERGENCY MEDICINE
Payer: COMMERCIAL

## 2022-06-23 ENCOUNTER — HOSPITAL ENCOUNTER (EMERGENCY)
Age: 32
Discharge: HOME | End: 2022-06-24
Attending: EMERGENCY MEDICINE
Payer: COMMERCIAL

## 2022-06-23 VITALS
OXYGEN SATURATION: 98 % | RESPIRATION RATE: 16 BRPM | HEART RATE: 94 BPM | SYSTOLIC BLOOD PRESSURE: 122 MMHG | DIASTOLIC BLOOD PRESSURE: 74 MMHG | TEMPERATURE: 98.1 F

## 2022-06-23 DIAGNOSIS — Z76.5 MALINGERING: Primary | ICD-10-CM

## 2022-06-23 DIAGNOSIS — M25.512 ACUTE PAIN OF LEFT SHOULDER: ICD-10-CM

## 2022-06-23 DIAGNOSIS — L29.9 PRURITUS: Primary | ICD-10-CM

## 2022-06-23 PROCEDURE — 6370000000 HC RX 637 (ALT 250 FOR IP): Performed by: EMERGENCY MEDICINE

## 2022-06-23 PROCEDURE — 99282 EMERGENCY DEPT VISIT SF MDM: CPT

## 2022-06-23 RX ORDER — DIPHENHYDRAMINE HCL 25 MG
25 TABLET ORAL ONCE
Status: COMPLETED | OUTPATIENT
Start: 2022-06-23 | End: 2022-06-23

## 2022-06-23 RX ORDER — ACETAMINOPHEN 500 MG
1000 TABLET ORAL ONCE
Status: COMPLETED | OUTPATIENT
Start: 2022-06-23 | End: 2022-06-23

## 2022-06-23 RX ADMIN — DIPHENHYDRAMINE HCL 25 MG: 25 TABLET ORAL at 00:37

## 2022-06-23 RX ADMIN — ACETAMINOPHEN 1000 MG: 500 TABLET ORAL at 00:40

## 2022-06-23 ASSESSMENT — ENCOUNTER SYMPTOMS
COLOR CHANGE: 0
ABDOMINAL PAIN: 0
BACK PAIN: 0
SHORTNESS OF BREATH: 0
EYE PAIN: 0

## 2022-06-23 ASSESSMENT — PAIN SCALES - GENERAL: PAINLEVEL_OUTOF10: 4

## 2022-06-23 NOTE — ED NOTES
Mode of arrival (squad #, walk in, police, etc) : walk in        Chief complaint(s): rash, back pain        Arrival Note (brief scenario, treatment PTA, etc). : Pt states that he has a rash on his chest and has back pain as well. Pt reports being anxious and depressed but repeatedly denies suicidal or homicidal ideation. Pt proceeded to continue to talk to himself while in the triage room, and then also drank water out of the faucet. C= \"Have you ever felt that you should Cut down on your drinking? \"  No  A= \"Have people Annoyed you by criticizing your drinking? \"  No  G= \"Have you ever felt bad or Guilty about your drinking? \"  No  E= \"Have you ever had a drink as an Eye-opener first thing in the morning to steady your nerves or to help a hangover? \"  No      Deferred []      Reason for deferring: N/A    *If yes to two or more: probable alcohol abuse. Moe Cummins RN  06/22/22 2017

## 2022-06-23 NOTE — ED PROVIDER NOTES
I originally signed up for this patient, patient eloped from emergency department prior to my evaluation, I did not see or evaluate this patient     Josef Bui DO  06/22/22 2046

## 2022-06-23 NOTE — ED PROVIDER NOTES
EMERGENCY DEPARTMENT ENCOUNTER    Pt Name: Bahman Tapia  MRN: [id number]  [place] 1990  Date of evaluation: 6/23/22  CHIEF COMPLAINT       Chief Complaint   Patient presents with    Rash    Shoulder Pain     HISTORY OF PRESENT ILLNESS   77-year-old male presents for complaint of left shoulder pain and itching. Patient reports he is having left shoulder pain for the last 2 hours, describes as an aching pain, denies any injuries, denies new numbness tingling or weakness to the area, denies any chest or back pain, denies any associated shortness of breath nausea or vomiting, also complaining of itching. The history is provided by the patient. REVIEW OF SYSTEMS     Review of Systems   Constitutional: Negative for chills and fever. HENT: Negative for congestion and ear pain. Eyes: Negative for pain. Respiratory: Negative for shortness of breath. Cardiovascular: Negative for chest pain, palpitations and leg swelling. Gastrointestinal: Negative for abdominal pain. Genitourinary: Negative for dysuria and flank pain. Musculoskeletal: Negative for back pain. Shoulder pain   Skin: Negative for color change. Neurological: Negative for numbness and headaches. Psychiatric/Behavioral: Negative for confusion. All other systems reviewed and are negative.     PASTMEDICAL HISTORY     Past Medical History:   Diagnosis Date    Anxiety     Arthritis     Asthma     Bipolar I disorder, most recent episode (or current) depressed, unspecified 9/12/2014    Clostridium difficile infection     COPD (chronic obstructive pulmonary disease) ([address]     Depression     Disease of blood and blood forming organ     Eczema     Fracture, metacarpal     R 4th and 5th    Gastric ulcer     Gastritis 06/13/2018    GERD (gastroesophageal reflux disease)     GI bleed     H. 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[facility]     Substance abuse ([facility]     Hx of opiates, benzos, cocaine, alcohol abuse    Suicidal ideation     Suicide attempt by hanging ([facility]     Tobacco dependence     Ulcerative colitis ([facility]     UTI (urinary tract infection)      Past Problem List  Patient Active Problem List   Diagnosis Code    Opioid abuse ([facility] F11.10    Noncompliance Z91.19    Rectal bleeding K62.5    Smoker F17.200    Juvenile rheumatoid arthritis ([facility] M08.00    SRIRAM (obstructive sleep apnea) G47.33    Primary insomnia F51.01    Calculus of bile duct with acute on chronic cholecystitis K80.46    Mild intermittent asthma without complication F85.17    Gastritis K29.70    Generalized abdominal pain R10.84    Anemia D64.9    Elevated liver enzymes R74.8    Nausea and vomiting R11.2    Opioid type dependence, continuous (HCC) F11.20    Abdominal pain R10.9    Diarrhea R19.7    Irritable bowel syndrome with both constipation and diarrhea K58.2    Schizoaffective disorder, bipolar type ([facility] F25.0    GI bleed K92.2    Depression with suicidal ideation F32. A, R45.851    Schizoaffective disorder ([facility] F25.9    MDD (major depressive disorder), recurrent severe, without psychosis ([facility] F33.2    Severe episode of recurrent major depressive disorder, without psychotic features ([facility] F33.2    Diabetes mellitus type 2 in obese ([facility] E11.69, E66.9    Mixed hyperlipidemia E78.2    Cocaine abuse ([facility] F14.10    Acute psychosis ([facility] F23    PUD (peptic ulcer disease) K27.9    Gastroesophageal reflux disease without esophagitis K21.9    Prediabetes R73.03     SURGICAL HISTORY       Past Surgical History:   Procedure Laterality Date    ABDOMEN SURGERY upper GI scope 7/7/2015    BRONCHOSCOPY      CHOLECYSTECTOMY, LAPAROSCOPIC  07/14/2017    surgery performed at [address], COLON, DIAGNOSTIC      OTHER SURGICAL HISTORY      lumbar puncture    [place] COLONOSCOPY W/BIOPSY SINGLE/MULTIPLE  8/9/2017    COLONOSCOPY WITH BIOPSY performed by [facility]elle [email or web address] Endoscopy    MO EGD TRANSORAL BIOPSY SINGLE/MULTIPLE N/A 3/20/2017    EGD BIOPSY performed by Manuela Dixon MD at [email or web address] Endoscopy    MO ESOPHAGOGASTRODUODENOSCOPY TRANSORAL DIAGNOSTIC N/A 8/9/2017    EGD ESOPHAGOGASTRODUODENOSCOPY performed by Gabrielle Ferrari MD at [facility] N/A 3/20/2017    SIGMOIDOSCOPY DIAGNOSTIC FLEXIBLE performed by Manuela Dixon MD at [facility]  2/4/16    UPPER GASTROINTESTINAL ENDOSCOPY N/A 6/13/2018    GASTRITIS    UPPER GASTROINTESTINAL ENDOSCOPY N/A 9/20/2018    EGD BIOPSY performed by Antione Adkins MD at [address] [place] [address]       Discharge Medication List as of 6/23/2022 12:36 AM      CONTINUE these medications which have NOT CHANGED    Details   ARIPiprazole (ABILIFY) 15 MG tablet Take 1 tablet by mouth daily, Disp-30 tablet, R-3Normal      buprenorphine-naloxone (SUBOXONE) 8-2 MG FILM SL film Place 1.5 Film under the tongue daily for 6 days. , Disp-9 Film, R-0Print      naloxone 4 MG/0.1ML LIQD nasal spray 1 spray by Nasal route as needed for Opioid Reversal, Disp-1 each, R-0Normal      cetirizine (ZYRTEC) 10 MG tablet Take 1 tablet by mouth daily, Disp-30 tablet, R-0Normal           ALLERGIES     is allergic to dicyclomine, famotidine, geodon [ziprasidone hcl], haloperidol, iv dye [iodides], reglan [metoclopramide], ibuprofen, aspirin, dicyclomine hcl, hydroxyzine hcl, iodine, metronidazole, mirtazapine, promethazine, and ziprasidone. FAMILY HISTORY     He indicated that his mother is alive.  He indicated that his father is alive. He indicated that the status of his sister is unknown. He indicated that the status of his other is unknown. He indicated that the status of his paternal cousin is unknown. SOCIAL HISTORY       Social History     Tobacco Use    Smoking status: Current Every Day Smoker     Packs/day: 0.50     Years: 15.00     Pack years: 7.50     Types: Cigarettes    Smokeless tobacco: Never Used   Vaping Use    Vaping Use: Former   Substance Use Topics    Alcohol use: Not Currently     Comment: drinks daily    Drug use: Yes     Types: Opiates , Cocaine     Comment: Hx of opiates, benzos, cocaine, alcohol abuse     PHYSICAL EXAM     INITIAL VITALS: BP (!) 132/93   Pulse 100   Temp 98 °F (36.7 °C) (Oral)   Resp 16   Ht 5' 7\" (1.702 m)   Wt 200 lb (90.7 kg)   SpO2 93%   BMI 31.32 kg/m²    Physical Exam  Vitals and nursing note reviewed. Constitutional:       General: He is not in acute distress. Appearance: Normal appearance. He is not ill-appearing. HENT:      Head: Normocephalic and atraumatic. Right Ear: External ear normal.      Left Ear: External ear normal.      Nose: Nose normal.      Mouth/Throat:      Mouth: Mucous membranes are moist.   Eyes:      Extraocular Movements: Extraocular movements intact. Pupils: Pupils are equal, round, and reactive to light. Cardiovascular:      Rate and Rhythm: Normal rate and regular rhythm. Pulses: Normal pulses. Heart sounds: Normal heart sounds. Pulmonary:      Effort: Pulmonary effort is normal.      Breath sounds: Normal breath sounds. Abdominal:      General: Abdomen is flat. Palpations: Abdomen is soft. Tenderness: There is no abdominal tenderness. Musculoskeletal:         General: Normal range of motion. Left shoulder: Tenderness present. Normal strength. Normal pulse. Cervical back: Neck supple. No spinous process tenderness or muscular tenderness. Skin:     General: Skin is warm and dry.       Capillary Refill: Capillary refill takes less than 2 seconds. Findings: No rash. Neurological:      General: No focal deficit present. Mental Status: He is alert and oriented to person, place, and time. Cranial Nerves: Cranial nerves are intact. Sensory: Sensation is intact. Motor: Motor function is intact. Psychiatric:         Behavior: Behavior normal.         Thought Content: Thought content does not include homicidal or suicidal ideation. MEDICAL DECISION MAKIN-year-old male presents for complaint of left shoulder pain and reported itching. On initial exam patient in no acute distress, vitals are stable, is noted to have some tenderness in the left shoulder, 5 out of 5 strength, +2 radial pulse, no obvious rash noted, patient was offered imaging of the shoulder which she declined, will provide symptomatic treatment    Discussed with patient need for follow-up with PCP and return precautions, patient voiced understanding and is comfortable with plan and discharge home    Patient/Guardian was informed of their diagnosis and told to follow up with PCP  in 1-3 days. Patient demonstrates understanding and agreement with the plan. They were given the opportunity to ask questions and those questions were answered to the best of our ability with the available information. Patient/Guardian told to return to the ED for any new, worsening, changing or persistent symptoms. This dictation was prepared using [employer] voice recognition software. CRITICAL CARE:       PROCEDURES:    Procedures    DIAGNOSTIC RESULTS   EKG:All EKG's are interpreted by the Emergency Department Physician who either signs or Co-signs this chart in the absence of a cardiologist.        RADIOLOGY:All plain film, CT, MRI, and formal ultrasound images (except ED bedside ultrasound) are read by the radiologist, see reports below, unless otherwisenoted in MDM or here.   No orders to display     LABS: All lab results were reviewed by myself, and all abnormals are listed below. Labs Reviewed - No data to display    EMERGENCY DEPARTMENTCOURSE:         Vitals:    Vitals:    06/22/22 2354   BP: (!) 132/93   Pulse: 100   Resp: 16   Temp: 98 °F (36.7 °C)   TempSrc: Oral   SpO2: 93%   Weight: 200 lb (90.7 kg)   Height: 5' 7\" (1.702 m)       The patient was given the following medications while in the emergency department:  Orders Placed This Encounter   Medications    diphenhydrAMINE (BENADRYL) tablet 25 mg    acetaminophen (TYLENOL) tablet 1,000 mg     CONSULTS:  None    FINAL IMPRESSION      1. Pruritus    2. Acute pain of left shoulder          DISPOSITION/PLAN   DISPOSITION Decision To Discharge 06/23/2022 12:30:01 AM      PATIENT REFERRED TO:  [facility] ED  [address] [place] [address] [address] [address]  [phone]    As needed, If symptoms worsen    [address] [address] [phone]  Schedule an appointment as soon as possible for a visit       DISCHARGE MEDICATIONS:  Discharge Medication List as of 6/23/2022 12:36 AM        The care is provided during an unprecedented national emergency due to the novel coronavirus, COVID 19.   DO Jennifer Mccann DO  06/23/22 5259

## 2022-06-23 NOTE — ED NOTES
Third ER visit today. Complaints vary. CP, LWBS before triage, and most currently rash and depression/anxiety. Denies SI and HI to documenting RN.       Anny Teresa RN  06/22/22 2029

## 2022-06-23 NOTE — ED NOTES
Patient refusing to wait for provider. Ambulatory out of dept with steady gait.       Charlotte Cloud, FLORY  06/22/22 2034

## 2022-06-24 ASSESSMENT — PAIN - FUNCTIONAL ASSESSMENT: PAIN_FUNCTIONAL_ASSESSMENT: NONE - DENIES PAIN

## 2022-06-24 NOTE — ED NOTES
Provisional Diagnosis:     Patient presented to ED due to suicidal thoughts. Patient has history of schizoaffective disorder. Psychosocial and Contextual Factors:     Patient homeless. Patient has history of substance abuse issues. C-SSRS Summary:    Patient reports SI without a plan    Patient: X  Family:   Agency:     Substance Abuse:  Patient denies current use, but does have significant history of substance abuse per Epic. Present Suicidal Behavior:    Patient reports SI without a plan. Verbal: X    Attempt:    Past Suicidal Behavior:   Patient has several past psychiatric admissions for SI, no previous attempts. Verbal: X    Attempt:    Self-Injurious/Self-Mutilation:  Patient denies    Violence Current or Past:   Patient does have a documented history of both verbal and physical aggression towards ED staff when he does not get what he wants. Trauma Identified:    N/A    Protective Factors:    Patient has insurance. Patient linked. Patient has had several past psych admissions. Risk Factors:    Patient has poor insight. Patient has history of substance abuse. Patient has history of noncompliance with outpatient treatment and medication management. Patient has history of both verbal and physical aggression. Clinical Summary:    Patient is a 32year old  male who presented to ED due to suicidal ideations. Patient was seen in this ER 3 times in the last 2 days in addition to numerous other hospital ED visits in [facility] for similar complaints in an attempt to find housing. Patient was just discharged from this [facility] on 6/21/22 after a 3 day stay for stabilization. Patient presented today with the same suicidal complaint that he always has stating he \"just needs help\". Patient indicated that he \"just needs to stay here for a couple days to get help\".   When patient is informed that this is the same story he presents the last time he was here, he becomes silent, and after a few seconds, attempts to alter his initial complaint for his ED visit in attempt to be admitted. Patient has history of manipulative behaviors in attempt to obtain food, medications and shelter. Patient arrived to ED today without shoes or socks and without a shirt. Patient was asked where his belongings were that he had yesterday he denied having any. Patient requested \"paper clothes\" from this facility, and when he was informed we do not have paper shirts, he attempted to argue with this writer and public safety. Patient stated he could not wear the socks that were on his feet due to them \"being covered in glass\". Patient was provided with a new pair of socks and referred to go to [facility] tomorrow morning because they have a free clothing drive every Friday from 10am-2pm.  Patient was encouraged to follow up with outpatient care and he should not continue to try and abuse hospital resources. Level of Care Disposition:    Patient does not meet criteria for inpatient hospitalization. Patient discharged and encouraged to follow up with outpatient care.

## 2022-06-24 NOTE — ED PROVIDER NOTES
[address] ED  eMERGENCY dEPARTMENT eNCOUnter      Pt Name: Arpit Bray  MRN: [id number]  YOB: 1990  Date of evaluation: 6/24/22  PCP: No primary care provider on file. CHIEF COMPLAINT:   Chief Complaint   Patient presents with    Psychiatric Evaluation     HISTORY OF PRESENT ILLNESS    Arpit Bray is a 32 y.o. male who presents with a chief complaint of a psychiatric evaluation. Patient tells me that he wants to get admitted to the hospital so he can \"get back on my medications\" and also because he is afraid he is going to hurt himself. He has no plan of suicide. No attempted self-harm today. He acknowledges recently being discharged from the hospital.  Has not been taking his medications. Has not followed up since he was discharged. Symptoms are chronic. Symptomatic. Nothing make symptoms better or worse. Patient has no other complaints at the time. REVIEW OF SYSTEMS       Constitutional: Denies recent fever, chills. Neck: No neck pain. Respiratory: Denies recent shortness of breath. Cardiac:  Denies recent chest pain. GI: Denies any recent abdominal pain nausea or vomiting. : Denies dysuria. Musculoskeletal: Denies focal weakness. Neurologic: Denies headache or focal weakness. Skin:  Denies any rash. Psychiatric: Denies suicidal plan    Negative in 10 essential Systems except as mentioned above and in the HPI.         PAST MEDICAL HISTORY   PMH:  has a past medical history of Anxiety, Arthritis, Asthma, Bipolar I disorder, most recent episode (or current) depressed, unspecified, Clostridium difficile infection, COPD (chronic obstructive pulmonary disease) ([address] Depression, Disease of blood and blood forming organ, Eczema, Fracture, metacarpal, Gastric ulcer, Gastritis, GERD (gastroesophageal reflux disease), GI bleed, H. 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 Surgical History:  has a past surgical history that includes Colonoscopy; bronchoscopy; other surgical history; Upper gastrointestinal endoscopy (2/4/16); pr egd transoral biopsy single/multiple (N/A, 3/20/2017); sigmoidoscopy (N/A, 3/20/2017); Cholecystectomy, laparoscopic (07/14/2017); pr esophagogastroduodenoscopy transoral diagnostic (N/A, 8/9/2017); pr colonoscopy w/biopsy single/multiple (8/9/2017); Abdomen surgery; Upper gastrointestinal endoscopy (N/A, 6/13/2018); Upper gastrointestinal endoscopy (N/A, 9/20/2018); and Endoscopy, colon, diagnostic. Social History:  reports that he has been smoking cigarettes. He has a 7.50 pack-year smoking history. He has never used smokeless tobacco. He reports previous alcohol use. He reports current drug use. Drugs: Opiates  and Cocaine. Family History: Noncontributory at this time  Psychiatric History: See PMH  Allergies:is allergic to dicyclomine, famotidine, geodon [ziprasidone hcl], haloperidol, iv dye [iodides], reglan [metoclopramide], ibuprofen, aspirin, dicyclomine hcl, hydroxyzine hcl, iodine, metronidazole, mirtazapine, promethazine, and ziprasidone.       PHYSICAL EXAM     INITIAL VITALS: BP: 122/74  Heart Rate: 94  Resp: 16  Temp: 98.1 °F (36.7 °C) SpO2: 98 %     Constitutional:  Well developed, no acute distress   Eyes:  Pupils equal and readily reactive to light  HENT:  Atraumatic, external ears normal, nose normal, oropharynx moist. Neck- supple    Respiratory:  Clear to auscultation bilaterally with good air exchange  Cardiovascular:  RRR with normal S1 and S2  GI:  Soft, nondistended and nontender   Musculoskeletal:  No edema, no tenderness, no deformities  Integument:  No rash  Neurologic:  Alert & oriented x 4, no focal deficits noted   Psychiatric: Normal mood and affect      EMERGENCY DEPARTMENT COURSE     35-year-old male history of chronic substance abuse, mental illness presents for his sixth visit or so over the past 2 or 3 days. He was just discharged from the hospital in the [facility]. He is afebrile, nontoxic, normal vital signs. No acute distress. He has no medical complaints. No active plan of suicide. No self-harm. Clinically I do not think patient would be a threat to himself or anybody else. I think he is malingering, using the emergency department for secondary gain. He chronically has the same story about just wanting to get admitted so he can get started on medications again. I do not think admission to the [facility] would be beneficial at all for this patient.  agrees with assessment. Medical screening exam is unremarkable. We will discharge home. FINAL IMPRESSION     1. Malingering          DISPOSITION:  DISPOSITION Decision To Discharge 06/24/2022 12:12:34 AM        PATIENT REFERRED TO:  [facility] ED  [facility] [address]  [phone]    As needed, If symptoms worsen      DISCHARGE MEDICATIONS:  Current Discharge Medication List          The care is provided during an unprecedented national emergency due to the novel coronavirus, COVID-19. (Please note that portions of this note were completed with a voice recognition program. Efforts were made to edit the dictations but occasionally words are mis-transcribed.  Whenever words are used in this note in any gender, they shall be construed as though they were used in the gender appropriate to the circumstances; and whenever words are used in this note in the singular or plural form, they shall be construed as though they were used in the form appropriate to the circumstances.)    Pascual Anderson DO  Attending Emergency Medicine Physician        Pascual Anderson DO  06/24/22 5780

## 2022-06-26 ENCOUNTER — HOSPITAL ENCOUNTER (EMERGENCY)
Age: 32
Discharge: HOME | End: 2022-06-26
Attending: EMERGENCY MEDICINE
Payer: COMMERCIAL

## 2022-06-26 VITALS
SYSTOLIC BLOOD PRESSURE: 137 MMHG | HEART RATE: 71 BPM | TEMPERATURE: 98.7 F | RESPIRATION RATE: 18 BRPM | DIASTOLIC BLOOD PRESSURE: 82 MMHG | OXYGEN SATURATION: 97 %

## 2022-06-26 DIAGNOSIS — Z13.9 ENCOUNTER FOR MEDICAL SCREENING EXAMINATION: Primary | ICD-10-CM

## 2022-06-26 PROCEDURE — 99282 EMERGENCY DEPT VISIT SF MDM: CPT

## 2022-06-26 PROCEDURE — 96372 THER/PROPH/DIAG INJ SC/IM: CPT

## 2022-06-26 PROCEDURE — 99284 EMERGENCY DEPT VISIT MOD MDM: CPT

## 2022-06-26 NOTE — ED PROVIDER NOTES
EMERGENCY DEPARTMENT ENCOUNTER    Pt Name: Lakisha Lopes  MRN: [id number]  [place] 1990  Date of evaluation: 6/26/22  CHIEF COMPLAINT       Chief Complaint   Patient presents with    Shoulder Pain    Anxiety     pt requesting ativan and benadryl     Medication Request     gabapentin, ativan, benadryl, 'antibiotic for trich' & narcan     Blister     pt reports bilateral feet blisters     HISTORY OF PRESENT ILLNESS   Patient is 70-year-old male who presents to the ED requesting transportation home. On my exam, patient is sleeping in exam bed and wakes up to tell me he would like a ride home. No other issues at this time. ROS:  No fevers, cough, shortness of breath, chest pain, abdominal pain, nausea, vomiting, changes in urine or stool. REVIEW OF SYSTEMS     Review of Systems   All other systems reviewed and are negative.     PASTMEDICAL HISTORY     Past Medical History:   Diagnosis Date    Anxiety     Arthritis     Asthma     Bipolar I disorder, most recent episode (or current) depressed, unspecified 9/12/2014    Clostridium difficile infection     COPD (chronic obstructive pulmonary disease) (HCC)     Depression     Disease of blood and blood forming organ     Eczema     Fracture, metacarpal     R 4th and 5th    Gastric ulcer     Gastritis 06/13/2018    GERD (gastroesophageal reflux disease)     GI bleed     H. 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     UTI (urinary tract infection)      SURGICAL HISTORY       Past Surgical History:   Procedure Laterality Date    ABDOMEN SURGERY      upper GI scope 7/7/2015    BRONCHOSCOPY      CHOLECYSTECTOMY, LAPAROSCOPIC  07/14/2017    surgery performed at [address] [place] [address], COLON, DIAGNOSTIC      OTHER SURGICAL HISTORY      lumbar puncture    [place] COLONOSCOPY W/BIOPSY SINGLE/MULTIPLE  8/9/2017    COLONOSCOPY WITH BIOPSY performed by Valerie Colón MD at [facility]    [place] EGD TRANSORAL BIOPSY SINGLE/MULTIPLE N/A 3/20/2017    EGD BIOPSY performed by Galina Escudero MD at [facility]    HI ESOPHAGOGASTRODUODENOSCOPY TRANSORAL DIAGNOSTIC N/A 8/9/2017    EGD ESOPHAGOGASTRODUODENOSCOPY performed by Valerie Colón MD at [address] N/A 3/20/2017    SIGMOIDOSCOPY DIAGNOSTIC FLEXIBLE [facility] [address] [facility]  2/4/16    UPPER GASTROINTESTINAL ENDOSCOPY N/A 6/13/2018    GASTRITIS    UPPER GASTROINTESTINAL ENDOSCOPY N/A 9/20/2018    EGD BIOPSY performed by Heri Casiano MD at [facility]       Previous Medications    ARIPIPRAZOLE (ABILIFY) 15 MG TABLET    Take 1 tablet by mouth daily    BUPRENORPHINE-NALOXONE (SUBOXONE) 8-2 MG FILM SL FILM    Place 1.5 Film under the tongue daily for 6 days. CETIRIZINE (ZYRTEC) 10 MG TABLET    Take 1 tablet by mouth daily    NALOXONE 4 MG/0.1ML LIQD NASAL SPRAY    1 spray by Nasal route as needed for Opioid Reversal     ALLERGIES     is allergic to dicyclomine, famotidine, geodon [ziprasidone hcl], haloperidol, iv dye [iodides], reglan [metoclopramide], ibuprofen, aspirin, dicyclomine hcl, hydroxyzine hcl, iodine, metronidazole, mirtazapine, promethazine, and ziprasidone. FAMILY HISTORY     He indicated that his mother is alive. He indicated that his father is alive.  He indicated that the status of his sister is unknown. He indicated that the status of his other is unknown. He indicated that the status of his paternal cousin is unknown. SOCIAL HISTORY       Social History     Tobacco Use    Smoking status: Current Every Day Smoker     Packs/day: 0.50     Years: 15.00     Pack years: 7.50     Types: Cigarettes    Smokeless tobacco: Never Used   Vaping Use    Vaping Use: Former   Substance Use Topics    Alcohol use: Not Currently     Comment: drinks daily    Drug use: Yes     Types: Opiates , Cocaine     Comment: Hx of opiates, benzos, cocaine, alcohol abuse     PHYSICAL EXAM     INITIAL VITALS: /82   Pulse 71   Temp 98.7 °F (37.1 °C) (Oral)   Resp 18   SpO2 97%    Physical Exam  HENT:      Head: Normocephalic. Right Ear: External ear normal.      Left Ear: External ear normal.      Nose: Nose normal.   Eyes:      Conjunctiva/sclera: Conjunctivae normal.   Cardiovascular:      Rate and Rhythm: Normal rate. Pulmonary:      Effort: Pulmonary effort is normal.   Abdominal:      General: Abdomen is flat. Skin:     General: Skin is dry. Neurological:      Mental Status: He is alert. Mental status is at baseline. Psychiatric:         Mood and Affect: Mood normal.         Behavior: Behavior normal.         MEDICAL DECISION MAKING:   The patient is hemodynamically stable, afebrile, nontoxic-appearing. Physical exam unremarkable. Based on history and exam low suspicion that this is an acute medical emergency. ED plan for will attempt to provide transportation. DIAGNOSTIC RESULTS   EKG:All EKG's are interpreted by the Emergency Department Physician who either signs or Co-signs this chart in the absence of a cardiologist.        RADIOLOGY:All plain film, CT, MRI, and formal ultrasound images (except ED bedside ultrasound) are read by the radiologist, see reports below, unless otherwisenoted in MDM or here.   No orders to display     LABS: All lab results were reviewed by myself, and

## 2022-06-26 NOTE — ED NOTES
Pt presents to ED with shoulder pain and c/o blister pain on both feet. Pt is requesting multiple medication refills. Pt reports anxiety and is asking for benadryl and ativan. Pt was seen at [facility] ED yesterday for c/o rash and medication refill.         Ramon Giordano RN  06/26/22 1408

## 2022-06-27 ENCOUNTER — HOSPITAL ENCOUNTER (EMERGENCY)
Age: 32
Discharge: HOME | End: 2022-06-27
Attending: EMERGENCY MEDICINE
Payer: COMMERCIAL

## 2022-06-27 VITALS
SYSTOLIC BLOOD PRESSURE: 124 MMHG | HEART RATE: 90 BPM | DIASTOLIC BLOOD PRESSURE: 73 MMHG | RESPIRATION RATE: 18 BRPM | TEMPERATURE: 97.7 F | OXYGEN SATURATION: 95 %

## 2022-06-27 DIAGNOSIS — L29.9 GENERALIZED PRURITUS: Primary | ICD-10-CM

## 2022-06-27 PROCEDURE — 6360000002 HC RX W HCPCS: Performed by: EMERGENCY MEDICINE

## 2022-06-27 RX ORDER — PROMETHAZINE HYDROCHLORIDE 25 MG/ML
6.25 INJECTION, SOLUTION INTRAMUSCULAR; INTRAVENOUS ONCE
Status: DISCONTINUED | OUTPATIENT
Start: 2022-06-27 | End: 2022-06-27

## 2022-06-27 RX ORDER — DIPHENHYDRAMINE HYDROCHLORIDE 50 MG/ML
10 INJECTION INTRAMUSCULAR; INTRAVENOUS ONCE
Status: COMPLETED | OUTPATIENT
Start: 2022-06-27 | End: 2022-06-27

## 2022-06-27 RX ADMIN — DIPHENHYDRAMINE HYDROCHLORIDE 10 MG: 50 INJECTION, SOLUTION INTRAMUSCULAR; INTRAVENOUS at 01:36

## 2022-06-27 NOTE — ED PROVIDER NOTES
EMERGENCY DEPARTMENT ENCOUNTER    Pt Name: Andres Alejandre  MRN: [id number]  [place] 1990  Date of evaluation: 6/27/22  CHIEF COMPLAINT       Chief Complaint   Patient presents with    Pruritis    Anxiety     Pt states he wants medication/treatment to help his anxiety. Pt states he will \"hit something in his heart\" if he does not get treated      HISTORY OF PRESENT ILLNESS   Patient is a 44-year-old male who presents to the ED for evaluation of generalized body pruritus that started approximately 1 week ago. Patient is requesting a shot of Benadryl and Phenergan. He was evaluated in this ED last night and his chief complaint was requesting a ride home. No other issues at this time. ROS:  No fevers, cough, shortness of breath, chest pain, abdominal pain, nausea, vomiting, changes in urine or stool. REVIEW OF SYSTEMS     Review of Systems   All other systems reviewed and are negative.     PASTMEDICAL HISTORY     Past Medical History:   Diagnosis Date    Anxiety     Arthritis     Asthma     Bipolar I disorder, most recent episode (or current) depressed, unspecified 9/12/2014    Clostridium difficile infection     COPD (chronic obstructive pulmonary disease) (HCC)     Depression     Disease of blood and blood forming organ     Eczema     Fracture, metacarpal     R 4th and 5th    Gastric ulcer     Gastritis 06/13/2018    GERD (gastroesophageal reflux disease)     GI bleed     H. 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[address]     Substance abuse ([address]     Hx of opiates, benzos, cocaine, alcohol abuse    Suicidal ideation     Suicide attempt by hanging ([facility]     Tobacco dependence     Ulcerative colitis ([facility]     UTI (urinary tract infection)      SURGICAL HISTORY       Past Surgical History:   Procedure Laterality Date    ABDOMEN SURGERY      upper GI scope 7/7/2015    BRONCHOSCOPY      CHOLECYSTECTOMY, LAPAROSCOPIC  07/14/2017    surgery performed at [address] [place] [address], COLON, DIAGNOSTIC      OTHER SURGICAL HISTORY      lumbar puncture    [place] COLONOSCOPY W/BIOPSY SINGLE/MULTIPLE  8/9/2017    COLONOSCOPY WITH BIOPSY performed by Jcarlos Boucher MD at [facility]    [place] EGD TRANSORAL BIOPSY SINGLE/MULTIPLE N/A 3/20/2017    EGD BIOPSY performed by Sal Krishnan MD at [facility]    [place] ESOPHAGOGASTRODUODENOSCOPY TRANSORAL DIAGNOSTIC N/A 8/9/2017    EGD ESOPHAGOGASTRODUODENOSCOPY performed by Jcarlos Boucher MD at [email or web address] N/A 3/20/2017    SIGMOIDOSCOPY DIAGNOSTIC FLEXIBLE performed [facility] at [address] [email or web address]  2/4/16    UPPER GASTROINTESTINAL ENDOSCOPY N/A 6/13/2018    GASTRITIS    UPPER GASTROINTESTINAL ENDOSCOPY N/A 9/20/2018    EGD BIOPSY performed by June Kilgore MD at [facility]       Previous Medications    ARIPIPRAZOLE (ABILIFY) 15 MG TABLET    Take 1 tablet by mouth daily    BUPRENORPHINE-NALOXONE (SUBOXONE) 8-2 MG FILM SL FILM    Place 1.5 Film under the tongue daily for 6 days. CETIRIZINE (ZYRTEC) 10 MG TABLET    Take 1 tablet by mouth daily    NALOXONE 4 MG/0.1ML LIQD NASAL SPRAY    1 spray by Nasal route as needed for Opioid Reversal     ALLERGIES     is allergic to dicyclomine, famotidine, geodon [ziprasidone hcl], haloperidol, iv dye [iodides], reglan [metoclopramide], ibuprofen, aspirin, dicyclomine hcl, hydroxyzine hcl, iodine, metronidazole, mirtazapine, promethazine, and ziprasidone. FAMILY HISTORY     He indicated that his mother is alive.  He indicated that his father is alive. He indicated that the status of his sister is unknown. He indicated that the status of his other is unknown. He indicated that the status of his paternal cousin is unknown. SOCIAL HISTORY       Social History     Tobacco Use    Smoking status: Current Every Day Smoker     Packs/day: 0.50     Years: 15.00     Pack years: 7.50     Types: Cigarettes    Smokeless tobacco: Never Used   Vaping Use    Vaping Use: Former   Substance Use Topics    Alcohol use: Not Currently     Comment: drinks daily    Drug use: Yes     Types: Opiates , Cocaine     Comment: Hx of opiates, benzos, cocaine, alcohol abuse     PHYSICAL EXAM     INITIAL VITALS: /73   Pulse 90   Temp 97.7 °F (36.5 °C) (Oral)   Resp 18   SpO2 95%    Physical Exam  HENT:      Head: Normocephalic. Right Ear: External ear normal.      Left Ear: External ear normal.      Nose: Nose normal.   Eyes:      Conjunctiva/sclera: Conjunctivae normal.   Cardiovascular:      Rate and Rhythm: Normal rate. Pulmonary:      Effort: Pulmonary effort is normal.      Breath sounds: Normal breath sounds. No wheezing. Abdominal:      General: Abdomen is flat. Skin:     General: Skin is dry. Neurological:      Mental Status: He is alert. Mental status is at baseline. Psychiatric:         Mood and Affect: Mood normal.         Behavior: Behavior normal.         MEDICAL DECISION MAKING:   The patient is hemodynamically stable, afebrile, nontoxic-appearing. Physical exam unremarkable. Based on history and exam is likely secondary to prescribe medications.   No concern for airway involvement   ED plan for Phenergan, Benadryl, discharge        DIAGNOSTIC RESULTS   EKG:All EKG's are interpreted by the Emergency Department Physician who either signs or Co-signs this chart in the absence of a cardiologist.        RADIOLOGY:All plain film, CT, MRI, and formal ultrasound images (except ED bedside ultrasound) are read by the radiologist, see reports below, unless otherwisenoted in MDM or here. No orders to display     LABS: All lab results were reviewed by myself, and all abnormals are listed below. Labs Reviewed - No data to display    EMERGENCY DEPARTMENTCOURSE:   Patient did well in the ED. Given Benadryl, Phenergan IM. No further work-up indicated at this time. Nursing notes reviewed. At this time this is what I find, the patient appears well and does not appear sick or toxic. I gave my usual and customary discussion of the risks and benefits of discharge versus admission. I answered the patient's questions. I gave the patient strict return precautions. Patient expressed understanding of the discharge instructions. The care is provided during an unprecedented national emergency due to the novel coronavirus, COVID-19. Dictated but not reviewed. Vitals:    Vitals:    06/27/22 0011   BP: 124/73   Pulse: 90   Resp: 18   Temp: 97.7 °F (36.5 °C)   TempSrc: Oral   SpO2: 95%       The patient was given the following medications while in the emergency department:  Orders Placed This Encounter   Medications    diphenhydrAMINE (BENADRYL) injection 10 mg    promethazine (PHENERGAN) injection 6.25 mg     CONSULTS:  None    FINAL IMPRESSION      1. Generalized pruritus          DISPOSITION/PLAN   DISPOSITION Decision To Discharge 06/27/2022 01:08:58 AM      PATIENT REFERRED TO:  No follow-up provider specified.   DISCHARGE MEDICATIONS:  New Prescriptions    No medications on file     Breann Ramirez MD  Attending Emergency Physician                    Red Marrufo MD  06/27/22 9977

## 2022-06-29 ENCOUNTER — HOSPITAL ENCOUNTER (EMERGENCY)
Age: 32
Discharge: HOME | End: 2022-06-29
Attending: EMERGENCY MEDICINE
Payer: COMMERCIAL

## 2022-06-29 VITALS
HEART RATE: 92 BPM | SYSTOLIC BLOOD PRESSURE: 121 MMHG | DIASTOLIC BLOOD PRESSURE: 65 MMHG | TEMPERATURE: 97.9 F | HEIGHT: 67 IN | WEIGHT: 200 LBS | BODY MASS INDEX: 31.39 KG/M2 | OXYGEN SATURATION: 97 % | RESPIRATION RATE: 16 BRPM

## 2022-06-29 DIAGNOSIS — F25.0 SCHIZOAFFECTIVE DISORDER, BIPOLAR TYPE (HCC): Primary | ICD-10-CM

## 2022-06-29 PROCEDURE — 99285 EMERGENCY DEPT VISIT HI MDM: CPT

## 2022-06-29 PROCEDURE — 96372 THER/PROPH/DIAG INJ SC/IM: CPT

## 2022-06-29 PROCEDURE — 6370000000 HC RX 637 (ALT 250 FOR IP): Performed by: EMERGENCY MEDICINE

## 2022-06-29 PROCEDURE — 6360000002 HC RX W HCPCS: Performed by: EMERGENCY MEDICINE

## 2022-06-29 RX ORDER — DIPHENHYDRAMINE HYDROCHLORIDE 50 MG/ML
25 INJECTION INTRAMUSCULAR; INTRAVENOUS ONCE
Status: COMPLETED | OUTPATIENT
Start: 2022-06-29 | End: 2022-06-29

## 2022-06-29 RX ORDER — ACETAMINOPHEN 500 MG
1000 TABLET ORAL ONCE
Status: COMPLETED | OUTPATIENT
Start: 2022-06-29 | End: 2022-06-29

## 2022-06-29 RX ADMIN — ACETAMINOPHEN 1000 MG: 500 TABLET ORAL at 01:47

## 2022-06-29 RX ADMIN — DIPHENHYDRAMINE HYDROCHLORIDE 25 MG: 50 INJECTION, SOLUTION INTRAMUSCULAR; INTRAVENOUS at 01:48

## 2022-06-29 ASSESSMENT — PAIN DESCRIPTION - LOCATION: LOCATION: EYE

## 2022-06-29 ASSESSMENT — PAIN SCALES - GENERAL
PAINLEVEL_OUTOF10: 9
PAINLEVEL_OUTOF10: 4

## 2022-06-29 ASSESSMENT — PAIN - FUNCTIONAL ASSESSMENT: PAIN_FUNCTIONAL_ASSESSMENT: 0-10

## 2022-06-29 ASSESSMENT — PAIN DESCRIPTION - FREQUENCY: FREQUENCY: CONTINUOUS

## 2022-06-29 ASSESSMENT — PAIN DESCRIPTION - DESCRIPTORS: DESCRIPTORS: ACHING
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
## 2022-06-30 ENCOUNTER — HOSPITAL ENCOUNTER (EMERGENCY)
Age: 32
Discharge: HOME | End: 2022-06-30
Attending: EMERGENCY MEDICINE
Payer: COMMERCIAL

## 2022-06-30 VITALS
WEIGHT: 190 LBS | BODY MASS INDEX: 29.82 KG/M2 | RESPIRATION RATE: 18 BRPM | TEMPERATURE: 97.6 F | DIASTOLIC BLOOD PRESSURE: 84 MMHG | SYSTOLIC BLOOD PRESSURE: 109 MMHG | HEART RATE: 93 BPM | OXYGEN SATURATION: 97 % | HEIGHT: 67 IN

## 2022-06-30 DIAGNOSIS — R45.851 SUICIDAL IDEATION: Primary | ICD-10-CM

## 2022-06-30 PROCEDURE — 99285 EMERGENCY DEPT VISIT HI MDM: CPT

## 2022-06-30 PROCEDURE — 6370000000 HC RX 637 (ALT 250 FOR IP): Performed by: EMERGENCY MEDICINE

## 2022-06-30 RX ORDER — ONDANSETRON 4 MG/1
4 TABLET, ORALLY DISINTEGRATING ORAL ONCE
Status: COMPLETED | OUTPATIENT
Start: 2022-06-30 | End: 2022-06-30

## 2022-06-30 RX ADMIN — ONDANSETRON 4 MG: 4 TABLET, ORALLY DISINTEGRATING ORAL at 03:51

## 2022-06-30 ASSESSMENT — ENCOUNTER SYMPTOMS
ABDOMINAL PAIN: 0
NAUSEA: 1
COUGH: 0
VOMITING: 0

## 2022-06-30 NOTE — ED PROVIDER NOTES
[address]  EMERGENCY DEPARTMENT ENCOUNTER      Pt Name: Sonny Mathis  MRN: [id number]  [place] 1990  Date of evaluation: 6/30/22      CHIEF COMPLAINT       Chief Complaint   Patient presents with    Suicidal    Nausea     HISTORY OF PRESENT ILLNESS   HPI 32 y.o. male presents with c/o suicidal thoughts. The patient reports that he feels suicidal because \"of everything going on\", but does not elaborate further. He states that he's currently homeless. He has been feeling this way for quite some time and has chronic suicidal thoughts. But denies many any recent suicide attempt. He states that he does have a distant history of suicide attempt by attempting to stab himself, but doesn't remember when that was. The patient was seen in the Emergency department on 6/29/22 at 12;30am and monitored until around 8am.  From here he went to [facility], then [facility]. After [place] he was seen at [facility]. After being evaluated at [facility] he took a cab here. The patient has a long history of mental illness and multiple recent psychiatric hopsitalizations most recently between 6/18/22 to 6/21/22. He gets his outpatient behavioral health care at the [facility] [facility]. He has been diagnosed with schizoaffective disorder, bipolar disorder, depression, and has problems with polysubstance abuse. He reports that he last used cocaine \"a couple days ago\", and last used opiates \"a long time ago\". REVIEW OF SYSTEMS       Review of Systems   Constitutional: Negative for fever. HENT: Negative for congestion. Eyes: Negative for visual disturbance. Respiratory: Negative for cough. Cardiovascular: Negative for chest pain. Gastrointestinal: Positive for nausea. Negative for abdominal pain and vomiting. Genitourinary: Negative for difficulty urinating. Musculoskeletal: Negative for arthralgias. Skin: Negative for rash. Neurological: Negative for headaches.    Psychiatric/Behavioral: Positive for dysphoric mood and suicidal ideas.        PAST MEDICAL HISTORY     Past Medical History:   Diagnosis Date    Anxiety     Arthritis     Asthma     Bipolar I disorder, most recent episode (or current) depressed, unspecified 9/12/2014    Clostridium difficile infection     COPD (chronic obstructive pulmonary disease) ([address]     Depression     Disease of blood and blood forming organ     Eczema     Fracture, metacarpal     R 4th and 5th    Gastric ulcer     Gastritis 06/13/2018    GERD (gastroesophageal reflux disease)     GI bleed     H. 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[address]     Substance abuse ([address]     Hx of opiates, benzos, cocaine, alcohol abuse    Suicidal ideation     Suicide attempt by hanging ([address]     Tobacco dependence     Ulcerative colitis ([address]     UTI (urinary tract infection)        SURGICAL HISTORY       Past Surgical History:   Procedure Laterality Date    ABDOMEN SURGERY      upper GI scope 7/7/2015    BRONCHOSCOPY      CHOLECYSTECTOMY, LAPAROSCOPIC  07/14/2017    surgery performed at [address] [place] [address], COLON, DIAGNOSTIC      OTHER SURGICAL HISTORY      lumbar puncture    IN COLONOSCOPY W/BIOPSY SINGLE/MULTIPLE  8/9/2017    COLONOSCOPY WITH BIOPSY performed by Carolyn Cruz MD at [facility]    IN EGD TRANSORAL BIOPSY SINGLE/MULTIPLE N/A 3/20/2017    EGD BIOPSY performed by Sameera Blackmon MD at [facility]    IN ESOPHAGOGASTRODUODENOSCOPY TRANSORAL DIAGNOSTIC N/A 8/9/2017    EGD ESOPHAGOGASTRODUODENOSCOPY performed by Carolyn Cruz MD at [facility]    SIGMOIDOSCOPY N/A 3/20/2017    SIGMOIDOSCOPY DIAGNOSTIC FLEXIBLE performed by Jacob Meraz MD at [address] [facility]  2/4/16    UPPER GASTROINTESTINAL ENDOSCOPY N/A 6/13/2018    GASTRITIS    UPPER GASTROINTESTINAL ENDOSCOPY N/A 9/20/2018    EGD BIOPSY performed by Amadou Lund MD at [facility] Medication List as of 6/30/2022  3:39 AM      CONTINUE these medications which have NOT CHANGED    Details   ARIPiprazole (ABILIFY) 15 MG tablet Take 1 tablet by mouth daily, Disp-30 tablet, R-3Normal      naloxone 4 MG/0.1ML LIQD nasal spray 1 spray by Nasal route as needed for Opioid Reversal, Disp-1 each, R-0Normal      cetirizine (ZYRTEC) 10 MG tablet Take 1 tablet by mouth daily, Disp-30 tablet, R-0Normal             ALLERGIES     is allergic to dicyclomine, famotidine, geodon [ziprasidone hcl], haloperidol, iv dye [iodides], reglan [metoclopramide], ibuprofen, aspirin, dicyclomine hcl, hydroxyzine hcl, iodine, metronidazole, mirtazapine, promethazine, and ziprasidone. FAMILY HISTORY     He indicated that his mother is alive. He indicated that his father is alive. He indicated that the status of his sister is unknown. He indicated that the status of his other is unknown. He indicated that the status of his paternal cousin is unknown. SOCIAL HISTORY      reports that he has been smoking cigarettes. He has a 7.50 pack-year smoking history. He has never used smokeless tobacco. He reports previous alcohol use. He reports previous drug use. Drugs: Opiates  and Cocaine.     PHYSICAL EXAM     INITIAL VITALS: /84   Pulse 93   Temp 97.6 °F (36.4 °C) (Oral)   Resp 18   Ht 5' 7\" (1.702 m)   Wt 190 lb (86.2 kg)   SpO2 97%   BMI 29.76 kg/m²   Gen: nad  Head: Normocephalic, atraumatic  Eye: Pupils equal round reactive to light, no conjunctivitis  ENT: MMM  Neck: no ligature marks or signs of trauma  Heart: Regular rate and rhythm no murmurs  Lungs: Clear to auscultation bilaterally, no respiratory distress  Chest wall: No crepitus, no tenderness palpation  Abdomen: Soft, nontender, nondistended, with no peritoneal signs  Skin: No lacerations or ecchymosis. Neurologic: Patient is alert and oriented x3, motor and sensation is intact in all 4 extremities, fluent speech    MEDICAL DECISION MAKING:     MDM  32 y.o. male with chronic suicidal thougths. No sign of self harm. Pt denies any attempt or plan at self harm. Pt does not appear intoxicated. Case reviewed with psychiatry. Pt unlikely to benefit from inpatient hospitalization at this time. Safety plan. Recommended close follow up with his outpatient behavioral health providers, return to ED if symptoms worsen, and warning precautions provided. DIAGNOSTIC RESULTS       EMERGENCY DEPARTMENT COURSE:   Vitals:    Vitals:    06/30/22 0124   BP: 109/84   Pulse: 93   Resp: 18   Temp: 97.6 °F (36.4 °C)   TempSrc: Oral   SpO2: 97%   Weight: 190 lb (86.2 kg)   Height: 5' 7\" (1.702 m)       The patient was given the following medications while in the emergency department:  Orders Placed This Encounter   Medications    ondansetron (ZOFRAN-ODT) disintegrating tablet 4 mg     -------------------------  CRITICAL CARE:   CONSULTS: None  PROCEDURES: Procedures     FINAL IMPRESSION      1.  Suicidal ideation          DISPOSITION/PLAN   DISPOSITION Decision To Discharge 06/30/2022 03:38:03 AM      PATIENT REFERRED TO:  [address] [place] [address]  [phone]    If symptoms worsen      DISCHARGE MEDICATIONS:  Discharge Medication List as of 6/30/2022  3:39 AM            Juanjose Jimenes MD  Attending Emergency Physician                      Juanjose Jimenes MD  06/30/22 [id number]

## 2022-06-30 NOTE — ED NOTES
Paradise Lacey is a 32year old male who presents to the ED via a cab. Pt stating pt is suicidal. Pt identifies pt's homelessness and \"everything going on in my life\" as the trigger to pt's SI. Pt was in the ED yesterday from 12:30am to 8 am until the [facility] opened. Pt left the [facility] went to [facility], then [facility] and is now back here. Pt was admitted to the [facility] 6/18/22- 6/21/22. Pt has been seen in the ED 14 times since then for various complaints. Pt has a history of manipulative behaviors in order to be admitted to the hospital, obtain food and medications. Pt had a follow up appt at the [facility] 6/27/22 and did not go. When confronted about this, pt stated \"that was a fake appointement. \"     It appears as though pt is trying to be admitted to the hospital for secondary gain. Pt immediately asking for food and a blanket upon entering the [facility]. Level of Care Disposition:. SW consulted with Sung Stark from psychiatry. Pt declined an admission to the [facility] at this time. Pt encouraged to follow up with outpatient and/or [address] [place] [address].

## 2022-06-30 NOTE — ED TRIAGE NOTES
Mode of arrival (squad #, walk in, police, etc) :walk in        Chief complaint(s): SI, nausea        Arrival Note (brief scenario, treatment PTA, etc). : pt to ED for SI  States plan to kill himself and will steal knives from the store. Pt denies HI,   +auditory and visual hallucinations- currently responding to internal stimuli  Unsure if hes had SI in the past, but presents to ED in paper scrubs  Also states he is nauseated, wants benadrly and phenergan         C= \"Have you ever felt that you should Cut down on your drinking? \"  No  A= \"Have people Annoyed you by criticizing your drinking? \"  No  G= \"Have you ever felt bad or Guilty about your drinking? \"  No  E= \"Have you ever had a drink as an Eye-opener first thing in the morning to steady your nerves or to help a hangover? \"  No

## 2022-07-03 ENCOUNTER — HOSPITAL ENCOUNTER (EMERGENCY)
Age: 32
Discharge: HOME | End: 2022-07-03
Attending: EMERGENCY MEDICINE
Payer: MEDICARE

## 2022-07-03 VITALS
SYSTOLIC BLOOD PRESSURE: 135 MMHG | HEART RATE: 105 BPM | RESPIRATION RATE: 16 BRPM | DIASTOLIC BLOOD PRESSURE: 81 MMHG | OXYGEN SATURATION: 97 % | TEMPERATURE: 99.1 F

## 2022-07-03 DIAGNOSIS — L30.4 CHAFING: ICD-10-CM

## 2022-07-03 DIAGNOSIS — G89.29 CHRONIC LEFT SHOULDER PAIN: Primary | ICD-10-CM

## 2022-07-03 DIAGNOSIS — M25.512 CHRONIC LEFT SHOULDER PAIN: Primary | ICD-10-CM

## 2022-07-03 PROCEDURE — 99283 EMERGENCY DEPT VISIT LOW MDM: CPT

## 2022-07-03 RX ORDER — ACETAMINOPHEN 325 MG/1
325 TABLET ORAL ONCE
Status: DISCONTINUED | OUTPATIENT
Start: 2022-07-03 | End: 2022-07-03 | Stop reason: HOSPADM

## 2022-07-03 ASSESSMENT — ENCOUNTER SYMPTOMS
SHORTNESS OF BREATH: 0
ABDOMINAL PAIN: 0
SORE THROAT: 0
BACK PAIN: 0
COUGH: 0
VOMITING: 0

## 2022-07-03 ASSESSMENT — PAIN SCALES - GENERAL: PAINLEVEL_OUTOF10: 5

## 2022-07-04 ENCOUNTER — HOSPITAL ENCOUNTER (EMERGENCY)
Age: 32
Discharge: HOME | End: 2022-07-04
Attending: EMERGENCY MEDICINE
Payer: MEDICARE

## 2022-07-04 VITALS
RESPIRATION RATE: 18 BRPM | SYSTOLIC BLOOD PRESSURE: 129 MMHG | HEART RATE: 86 BPM | BODY MASS INDEX: 29.82 KG/M2 | OXYGEN SATURATION: 98 % | HEIGHT: 67 IN | WEIGHT: 190 LBS | DIASTOLIC BLOOD PRESSURE: 80 MMHG | TEMPERATURE: 98.3 F

## 2022-07-04 VITALS
DIASTOLIC BLOOD PRESSURE: 76 MMHG | RESPIRATION RATE: 16 BRPM | WEIGHT: 190 LBS | SYSTOLIC BLOOD PRESSURE: 128 MMHG | BODY MASS INDEX: 29.82 KG/M2 | HEIGHT: 67 IN | TEMPERATURE: 98.4 F | OXYGEN SATURATION: 96 % | HEART RATE: 92 BPM

## 2022-07-04 DIAGNOSIS — K62.9 ANAL LESION: Primary | ICD-10-CM

## 2022-07-04 DIAGNOSIS — M25.512 CHRONIC LEFT SHOULDER PAIN: ICD-10-CM

## 2022-07-04 DIAGNOSIS — G89.29 CHRONIC LEFT SHOULDER PAIN: ICD-10-CM

## 2022-07-04 DIAGNOSIS — R21 RASH AND OTHER NONSPECIFIC SKIN ERUPTION: Primary | ICD-10-CM

## 2022-07-04 PROCEDURE — 6370000000 HC RX 637 (ALT 250 FOR IP): Performed by: EMERGENCY MEDICINE

## 2022-07-04 PROCEDURE — 96372 THER/PROPH/DIAG INJ SC/IM: CPT

## 2022-07-04 PROCEDURE — 6360000002 HC RX W HCPCS: Performed by: EMERGENCY MEDICINE

## 2022-07-04 PROCEDURE — 99285 EMERGENCY DEPT VISIT HI MDM: CPT

## 2022-07-04 PROCEDURE — 99283 EMERGENCY DEPT VISIT LOW MDM: CPT

## 2022-07-04 RX ORDER — ACYCLOVIR 400 MG/1
400 TABLET ORAL 3 TIMES DAILY
Qty: 50 TABLET | Refills: 0 | Status: SHIPPED | OUTPATIENT
Start: 2022-07-04 | End: 2022-07-14

## 2022-07-04 RX ORDER — ONDANSETRON 4 MG/1
4 TABLET, ORALLY DISINTEGRATING ORAL EVERY 8 HOURS PRN
Status: DISCONTINUED | OUTPATIENT
Start: 2022-07-04 | End: 2022-07-05 | Stop reason: HOSPADM

## 2022-07-04 RX ORDER — ACYCLOVIR 800 MG/1
400 TABLET ORAL ONCE
Status: COMPLETED | OUTPATIENT
Start: 2022-07-04 | End: 2022-07-04

## 2022-07-04 RX ORDER — DIPHENHYDRAMINE HYDROCHLORIDE, ZINC ACETATE 2; .1 G/100G; G/100G
CREAM TOPICAL 3 TIMES DAILY PRN
Status: DISCONTINUED | OUTPATIENT
Start: 2022-07-04 | End: 2022-07-05 | Stop reason: HOSPADM

## 2022-07-04 RX ORDER — ONDANSETRON 4 MG/1
4 TABLET, ORALLY DISINTEGRATING ORAL ONCE
Status: COMPLETED | OUTPATIENT
Start: 2022-07-04 | End: 2022-07-04

## 2022-07-04 RX ORDER — DIPHENHYDRAMINE HYDROCHLORIDE 50 MG/ML
25 INJECTION INTRAMUSCULAR; INTRAVENOUS ONCE
Status: COMPLETED | OUTPATIENT
Start: 2022-07-04 | End: 2022-07-04

## 2022-07-04 RX ADMIN — DIPHENHYDRAMINE HYDROCHLORIDE, ZINC ACETATE: 2; .1 CREAM TOPICAL at 22:56

## 2022-07-04 RX ADMIN — ONDANSETRON 4 MG: 4 TABLET, ORALLY DISINTEGRATING ORAL at 16:40

## 2022-07-04 RX ADMIN — ONDANSETRON 4 MG: 4 TABLET, ORALLY DISINTEGRATING ORAL at 22:56

## 2022-07-04 RX ADMIN — ACYCLOVIR 400 MG: 800 TABLET ORAL at 16:57

## 2022-07-04 RX ADMIN — DIPHENHYDRAMINE HYDROCHLORIDE 25 MG: 50 INJECTION, SOLUTION INTRAMUSCULAR; INTRAVENOUS at 16:41

## 2022-07-04 ASSESSMENT — PAIN - FUNCTIONAL ASSESSMENT: PAIN_FUNCTIONAL_ASSESSMENT: 0-10

## 2022-07-04 ASSESSMENT — PAIN SCALES - GENERAL: PAINLEVEL_OUTOF10: 10

## 2022-07-04 NOTE — ED PROVIDER NOTES
[facility] ED  Emergency Department Encounter  EmergencyMedicine Resident     Pt Kenny Puls Jennifer Primrose  MRN: [id number]  [place] 1990  Date of evaluation: 7/3/22  PCP:  No primary care provider on file. This patient was evaluated in the Emergency Department for symptoms described in the history of present illness. The patient was evaluated in the context of the global COVID-19 pandemic, which necessitated consideration that the patient might be at risk for infection with the SARS-CoV-2 virus that causes COVID-19. Institutional protocols and algorithms that pertain to the evaluation of patients at risk for COVID-19 are in a state of rapid change based on information released by regulatory bodies including the CDC and federal and state organizations. These policies and algorithms were followed during the patient's care in the ED. CHIEF COMPLAINT       Chief Complaint   Patient presents with    Shoulder Pain     HISTORY OF PRESENT ILLNESS  (Location/Symptom, Timing/Onset, Context/Setting, Quality, Duration, Modifying Factors, Severity.)      Ju Tello is a 32 y.o. male who presents with complaints of left shoulder pain, patient states that is been ongoing for multiple years denying new symptoms or injury today. Patient also talks about how he feels like he is being burned and feels like there is a lighter on his skin. Patient states that he feels that his skin is irritated, that he is shedding and \"he wants to feel lighter\". Patient was just seen at [facility] earlier today for burning in the buttocks area, was subsequently discharged with Polytrim, examination showed no obvious burns to the skin. Patient denies other symptoms at this time.     PAST MEDICAL / SURGICAL / SOCIAL / FAMILY HISTORY      has a past medical history of Anxiety, Arthritis, Asthma, Bipolar I disorder, most recent episode (or current) depressed, unspecified, Clostridium difficile infection, COPD activity: Yes     Partners: Female     Comment: Lives alone, not working. Other Topics Concern    Not on file   Social History Narrative    ** Merged History Encounter **          Social Determinants of Health     Financial Resource Strain: Medium Risk    Difficulty of Paying Living Expenses: Somewhat hard   Food Insecurity: No Food Insecurity    Worried About Running Out of Food in the Last Year: Never true    Micaela of Food in the Last Year: Never true   Transportation Needs: No Transportation Needs    Lack of Transportation (Medical): No    Lack of Transportation (Non-Medical):  No   Physical Activity: Inactive    Days of Exercise per Week: 0 days    Minutes of Exercise per Session: 0 min   Stress: Stress Concern Present    Feeling of Stress : Rather much   Social Connections: Socially Isolated    Frequency of Communication with Friends and Family: Never    Frequency of Social Gatherings with Friends and Family: Never    Attends [employer] Services: Never    Active Member of Clubs or Organizations: No    Attends Club or Organization Meetings: Never    Marital Status: Never    Intimate Partner Violence: Not At Risk    Fear of Current or Ex-Partner: No    Emotionally Abused: No    Physically Abused: No    Sexually Abused: No   Housing Stability: Low Risk     Unable to Pay for Housing in the Last Year: No    Number of [place] in the Last Year: 1    Unstable Housing in the Last Year: No       Family History   Problem Relation Age of Onset    Diabetes Father     Alcohol Abuse Father     Depression Father     Arthritis Father     High Blood Pressure Father     Other Father         aneurysm & epilepsy    Migraines Father     Arthritis Mother     Other Mother         aneurysm & epilepsy    Migraines Mother     Diabetes Brother         Aunt and uncles    Depression Brother     Mental Illness Brother     Other Brother         epilepsy    Migraines Brother     Stroke Other Uncle    Other Brother         murdered Oct 6th, 2014    Colon Cancer Paternal Cousin 43    Other Sister         epilepsy   Danielle Migraines Sister        Allergies:  Dicyclomine, Famotidine, Geodon [ziprasidone hcl], Haloperidol, Iv dye [iodides], Reglan [metoclopramide], Ibuprofen, Aspirin, Dicyclomine hcl, Hydroxyzine hcl, Iodine, Metronidazole, Mirtazapine, Promethazine, and Ziprasidone    Home Medications:  Prior to Admission medications    Medication Sig Start Date End Date Taking? Authorizing Provider   Zinc Oxide 6 % CREA Apply 1 applicator topically in the morning and at bedtime 7/3/22  Yes Maximus Briones MD   ARIPiprazole (ABILIFY) 15 MG tablet Take 1 tablet by mouth daily 6/22/22   Sharon Monson MD   naloxone 4 MG/0.1ML LIQD nasal spray 1 spray by Nasal route as needed for Opioid Reversal 6/21/22   Sharon Monson MD   cetirizine (ZYRTEC) 10 MG tablet Take 1 tablet by mouth daily 6/22/22   Sharon Monson MD   meloxicam (MOBIC) 7.5 MG tablet Take 1 tablet by mouth 2 times daily as needed for Pain 8/19/21 10/30/21  Kwesi Navarro APRSHAYAN - CNP       REVIEW OF SYSTEMS    (2-9 systems for level 4, 10 or more for level 5)      Review of Systems   Constitutional: Negative for chills and fever. HENT: Negative for sore throat. Eyes: Negative for visual disturbance. Respiratory: Negative for cough and shortness of breath. Cardiovascular: Negative for chest pain and palpitations. Gastrointestinal: Negative for abdominal pain and vomiting. Endocrine: Negative for polyuria. Genitourinary: Negative for dysuria and hematuria. Musculoskeletal: Negative for back pain. Skin: Negative for rash. Neurological: Negative for light-headedness and headaches. Psychiatric/Behavioral: Negative for confusion.        PHYSICAL EXAM   (up to 7 for level 4, 8 or more for level 5)      INITIAL VITALS:   /81   Pulse (!) 105   Temp 99.1 °F (37.3 °C) (Oral)   Resp 16   SpO2 97%     Physical emergency departments for multiple issues. Vital signs stable. Physical exam showing patient in light blue scrubs, full range of movement of left shoulder intact, strength intact, sensation intact. No point tenderness to palpation. Skin is unremarkable for rashes or irritation. Examination of the skin around the buttocks showing chafing. Tylenol and ice given. Discussed with patient with regards to follow-up with primary care physician and for return precautions. Patient verbalized agreement understanding. Stable for discharge. EMERGENCY DEPARTMENT COURSE:        No notes of EC Admission Criteria type on file. PROCEDURES:  None    CONSULTS:  None    FINAL IMPRESSION      1. Chronic left shoulder pain    2.  Chafing          DISPOSITION / PLAN     DISPOSITION        PATIENT REFERRED TO:  [address] [address] [phone]  Schedule an appointment as soon as possible for a visit   For follow up    [address] [address]  [phone]  Go to   As needed      DISCHARGE MEDICATIONS:  New Prescriptions    ZINC OXIDE 6 % CREA    Apply 1 applicator topically in the morning and at bedtime       Tree Villalba MD  Emergency Medicine Resident    (Please note that portions of thisnote were completed with a voice recognition program.  Efforts were made to edit the dictations but occasionally words are mis-transcribed.)        Tree Villalba MD  Resident  07/03/22 4775

## 2022-07-04 NOTE — ED PROVIDER NOTES
[address]  ED  eMERGENCY dEPARTMENT eNCOUnter   Attending Attestation     Pt Name: Juan Foote  MRN: [id number]  [place] 1990  Date of evaluation: 7/3/22       Juan Foote is a 32 y.o. male who presents with Shoulder Pain      History: Patient presents with left shoulder pain, burning sensation between his legs, feel like his skin is itchy all over. Exam: Heart rate very mildly elevated. Lungs are clear to auscultation bilaterally. Abdomen is soft, nontender. Patient awake and alert. Patient has some excoriation between his legs which appears to be chafing. Plan for symptomatic treatment, discharged follow-up PCP. I performed a history and physical examination of the patient and discussed management with the resident. I reviewed the residents note and agree with the documented findings and plan of care. Any areas of disagreement are noted on the chart. I was personally present for the key portions of any procedures. I have documented in the chart those procedures where I was not present during the key portions. I have personally reviewed all images and agree with the resident's interpretation. I have reviewed the emergency nurses triage note. I agree with the chief complaint, past medical history, past surgical history, allergies, medications, social and family history as documented unless otherwise noted below. Documentation of the HPI, Physical Exam and Medical Decision Making performed by medical students or scribes is based on my personal performance of the HPI, PE and MDM. For Phys Assistant/ Nurse Practitioner cases/documentation I have had a face to face evaluation of this patient and have completed at least one if not all key elements of the E/M (history, physical exam, and MDM). Additional findings are as noted.     For APC cases I have personally evaluated and examined the patient in conjunction with the APC and agree with the treatment plan and disposition of

## 2022-07-04 NOTE — ED NOTES
Pt. Discharge instructions reviewed. Pt has no further questions at [facility] time.       Sophie Chandler RN  07/03/22 3930

## 2022-07-04 NOTE — ED NOTES
Pt. Presents to the ED for left shoulder pain. Pt states that he has been to a few hospitals for this today and nothing has helped him. Pt has full range of motion in the left arm. Pt. Also asking for benadryl for a rash on his chest. Pt skin is WDL. Pt resp even and non labored. Pt a&ox4. No SI or HI present on evaluation. Will continue with plan of care.      Марина Nguyen RN  07/03/22 2050

## 2022-07-05 NOTE — ED TRIAGE NOTES
Pt presents in hospital scrubs. Pt states he feel \"out of it\". Pt not answering questions at this time. Pt not making eye contact.

## 2022-07-05 NOTE — ED PROVIDER NOTES
EMERGENCY DEPARTMENT ENCOUNTER    Pt Name: Arian Garcias  MRN: [id number]  [place] 1990  Date of evaluation: 7/4/22  CHIEF COMPLAINT       Chief Complaint   Patient presents with    Depression     pt states he feels \"out of it\"     HISTORY OF PRESENT ILLNESS   Patient is a 77-year-old male who presents to the ED for evaluation of itchy rash. Rash is chronic. No airway involvement. He was just discharged from [facility] 4 hours ago for anal rash. No other issues at this time. ROS:  No fevers, cough, shortness of breath, chest pain, abdominal pain, nausea, vomiting, changes in urine or stool. REVIEW OF SYSTEMS     Review of Systems   All other systems reviewed and are negative.     PASTMEDICAL HISTORY     Past Medical History:   Diagnosis Date    Anxiety     Arthritis     Asthma     Bipolar I disorder, most recent episode (or current) depressed, unspecified 9/12/2014    Clostridium difficile infection     COPD (chronic obstructive pulmonary disease) (HCC)     Depression     Disease of blood and blood forming organ     Eczema     Fracture, metacarpal     R 4th and 5th    Gastric ulcer     Gastritis 06/13/2018    GERD (gastroesophageal reflux disease)     GI bleed     H. 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[address]     Substance abuse ([address]     Hx of opiates, benzos, cocaine, alcohol abuse    Suicidal ideation     Suicide attempt by hanging ([address]     Tobacco dependence     Ulcerative colitis ([address]     UTI (urinary tract infection)      SURGICAL HISTORY       Past Surgical History:   Procedure Laterality Date    ABDOMEN SURGERY      upper GI scope 7/7/2015    BRONCHOSCOPY      CHOLECYSTECTOMY, LAPAROSCOPIC  07/14/2017    surgery performed at [address] [place] [address], COLON, DIAGNOSTIC      OTHER SURGICAL HISTORY      lumbar puncture    FL COLONOSCOPY W/BIOPSY SINGLE/MULTIPLE  8/9/2017    COLONOSCOPY WITH BIOPSY performed by [email or web address] End[facility]    FL EGD TRANSORAL BIOPSY SINGLE/MULTIPLE N/A 3/20/2017    EGD BIOPSY performed by Marine Sanchez MD at [facility] Endoscopy    FL ESOPHAGOGASTRODUODENOSCOPY TRANSORAL DIAGNOSTIC N/A 8/9/2017    EGD ESOPHAGOGASTRODUODENOSCOPY performed by Jessica Concepcion MD at [address] N/A 3/20/2017    [address] [facility] performed by Marine Sanchez MD at [place] [address]  2/4/16    UPPER GASTROINTESTINAL ENDOSCOPY N/A 6/13/2018    GASTRITIS    UPPER GASTROINTESTINAL ENDOSCOPY N/A 9/20/2018    EGD BIOPSY performed by Chester Quiroz MD at [facility]       Previous Medications    ACYCLOVIR (ZOVIRAX) 400 MG TABLET    Take 1 tablet by mouth 3 times daily for 10 days    ARIPIPRAZOLE (ABILIFY) 15 MG TABLET    Take 1 tablet by mouth daily    CETIRIZINE (ZYRTEC) 10 MG TABLET    Take 1 tablet by mouth daily    NALOXONE 4 MG/0.1ML LIQD NASAL SPRAY    1 spray by Nasal route as needed for Opioid Reversal    ZINC OXIDE 6 % CREA    Apply 1 applicator topically in the morning and at bedtime     ALLERGIES     is allergic to dicyclomine, famotidine, geodon [ziprasidone hcl], haloperidol, iv dye [iodides], reglan [metoclopramide], ibuprofen, aspirin, dicyclomine hcl, hydroxyzine hcl, iodine, metronidazole, mirtazapine, promethazine, and ziprasidone. FAMILY HISTORY     He indicated that his mother is alive. He indicated that his father is alive. He indicated that the status of his sister is unknown. He indicated that the status of his other is unknown. He indicated that the status of his paternal cousin is unknown. SOCIAL HISTORY       Social History     Tobacco Use    Smoking status: Current Every Day Smoker     Packs/day: 0.50     Years: 15.00     Pack years: 7.50     Types: Cigarettes    Smokeless tobacco: Never Used   Vaping Use    Vaping Use: Former   Substance Use Topics    Alcohol use: Not Currently     Comment: drinks daily    Drug use: Not Currently     Types: Opiates , Cocaine     Comment: Hx of opiates, benzos, cocaine, alcohol abuse     PHYSICAL EXAM     INITIAL VITALS: /80   Pulse 86   Temp 98.3 °F (36.8 °C) (Oral)   Resp 18   Ht 5' 7\" (1.702 m)   Wt 190 lb (86.2 kg)   SpO2 98%   BMI 29.76 kg/m²    Physical Exam  HENT:      Head: Normocephalic. Right Ear: External ear normal.      Left Ear: External ear normal.      Nose: Nose normal.   Eyes:      Conjunctiva/sclera: Conjunctivae normal.   Cardiovascular:      Rate and Rhythm: Normal rate. Pulmonary:      Effort: Pulmonary effort is normal.   Abdominal:      General: Abdomen is flat. Skin:     General: Skin is dry. Neurological:      Mental Status: He is alert. Mental status is at baseline. Psychiatric:         Mood and Affect: Mood normal.         Behavior: Behavior normal.         MEDICAL DECISION MAKING:   The patient is hemodynamically stable, afebrile, nontoxic-appearing. Physical exam notable for scattered maculopapules across chest and back. Based on history and exam rash has improved since my last exam last week. Otherwise unremarkable exam, patient is in usual state of health. ED plan for [employer], Zofran x1, discharge.         DIAGNOSTIC RESULTS   EKG:All EKG's are interpreted by the Emergency Department Physician who either signs or Co-signs this chart in the absence of a cardiologist.        RADIOLOGY:All plain film, CT, MRI, and formal ultrasound images (except ED bedside ultrasound) are read by the radiologist, see reports below, unless otherwisenoted in MDM or here. No orders to display     LABS: All lab results were reviewed by myself, and all abnormals are listed below. Labs Reviewed - No data to display    EMERGENCY DEPARTMENTCOURSE:   No further work-up indicated at this time. Nursing notes reviewed. At this time this is what I find, the patient appears well and does not appear sick or toxic. I gave my usual and customary discussion of the risks and benefits of discharge versus admission. I answered the patient's questions. I gave the patient strict return precautions. Patient expressed understanding of the discharge instructions. The care is provided during an unprecedented national emergency due to the novel coronavirus, COVID-19. Dictated but not reviewed. Vitals:    Vitals:    07/04/22 2236 07/04/22 2239   BP: 129/80    Pulse:  86   Resp: 18    Temp: 98.3 °F (36.8 °C)    TempSrc: Oral    SpO2: 98%    Weight: 190 lb (86.2 kg)    Height: 5' 7\" (1.702 m)        The patient was given the following medications while in the emergency department:  Orders Placed This Encounter   Medications    diphenhydrAMINE-zinc acetate cream    ondansetron (ZOFRAN-ODT) disintegrating tablet 4 mg     CONSULTS:  None    FINAL IMPRESSION      1. Rash and other nonspecific skin eruption          DISPOSITION/PLAN   DISPOSITION Decision To Discharge 07/04/2022 10:44:05 PM      PATIENT REFERRED TO:  No follow-up provider specified.   DISCHARGE MEDICATIONS:  New Prescriptions    No medications on file     Romel Caraballo MD  Attending Emergency Physician                    Lorenza Glass MD  07/04/22 7916

## 2022-07-05 NOTE — ED PROVIDER NOTES
[address] [address]     UTI (urinary tract infection)        SURGICAL HISTORY       Past Surgical History:   Procedure Laterality Date    ABDOMEN SURGERY      upper GI scope 7/7/2015    BRONCHOSCOPY      CHOLECYSTECTOMY, LAPAROSCOPIC  07/14/2017    surgery performed at [address] [place] [address], COLON, DIAGNOSTIC      OTHER SURGICAL HISTORY      lumbar puncture    OR COLONOSCOPY W/BIOPSY SINGLE/MULTIPLE  8/9/2017    COLONOSCOPY WITH BIOPSY performed by Martha Eddy MD at [facility] Endoscopy    OR EGD TRANSORAL BIOPSY SINGLE/MULTIPLE N/A 3/20/2017    EGD BIOPSY performed by Wilfredo Eden MD at [facility] Endoscopy    OR ESOPHAGOGASTRODUODENOSCOPY TRANSORAL DIAGNOSTIC N/A 8/9/2017    EGD ESOPHAGOGASTRODUODENOSCOPY performed by Martha Eddy MD at [facility] N/A 3/20/2017    SIGMOIDOSCOPY DIAGNOSTIC FLEXIBLE performed by Wilfredo Eden MD at [place] [address]  2/4/16    UPPER GASTROINTESTINAL ENDOSCOPY N/A 6/13/2018    GASTRITIS    UPPER GASTROINTESTINAL ENDOSCOPY N/A 9/20/2018    EGD BIOPSY performed by Celestina Pollard MD at [address]       Discharge Medication List as of 7/4/2022  4:30 PM      CONTINUE these medications which have NOT CHANGED    Details   Zinc Oxide 6 % CREA Apply 1 applicator topically in the morning and at bedtime, Disp-114 g, R-0Print      ARIPiprazole (ABILIFY) 15 MG tablet Take 1 tablet by mouth daily, Disp-30 tablet, R-3Normal      naloxone 4 MG/0.1ML LIQD nasal spray 1 spray by Nasal route as needed for Opioid Reversal, Disp-1 each, R-0Normal      cetirizine (ZYRTEC) 10 MG tablet Take 1 tablet by mouth daily, Disp-30 tablet, R-0Normal             ALLERGIES     is allergic to dicyclomine, famotidine, geodon [ziprasidone hcl], haloperidol, iv dye [iodides], reglan [metoclopramide], ibuprofen, aspirin, dicyclomine hcl, hydroxyzine hcl, iodine, metronidazole, mirtazapine, promethazine, and ziprasidone. FAMILY HISTORY     He indicated that his mother is alive. He indicated that his father is alive. He indicated that the status of his sister is unknown. He indicated that the status of his other is unknown. He indicated that the status of his paternal cousin is unknown. SOCIAL HISTORY      reports that he has been smoking cigarettes. He has a 7.50 pack-year smoking history. He has never used smokeless tobacco. He reports previous alcohol use. He reports previous drug use. Drugs: Opiates  and Cocaine. PHYSICAL EXAM     INITIAL VITALS: /76   Pulse 92   Temp 98.4 °F (36.9 °C) (Oral)   Resp 16   Ht 5' 7\" (1.702 m)   Wt 190 lb (86.2 kg)   SpO2 96%   BMI 29.76 kg/m²   Gen: NAD  Head; NCAT  Eyes: PERRL, anicteric  ENT: MMM  Neck: No adenopathy  CVS:RRR  PULM: CTA  ABD: Soft, no ttp  Rectal exam chaperoned by the nurse Alex Wiley. The patient does have some i multiple small ulcerative lesions around his anus. Very tender to palpation. MSK: Range of motion of the left shoulder. No tenderness over the knuckle. No tenderness over the shoulder or AC joint. Neuro: ANO x3 fluent speech ambulatory with normal gait    MEDICAL DECISION MAKING:     MDM  32 y.o. male Presenting with complaints of shoulder pain and also rectal pain. He has some ulcerative lesions around his anus I am concerned are genital herpes. We will start him on state acyclovir. As of his shoulder he has full range of motion with no focal tenderness I doubt any fracture dislocation or other significant injury. D/w pt treatment plan, warning precautions for prompt ED return and importance of close OP FU, he verbalizes understanding.      DIAGNOSTIC RESULTS       EMERGENCY DEPARTMENT COURSE:   Vitals:    Vitals:    07/04/22 1616 07/04/22 1618 07/04/22 1628   BP:  128/76    Pulse:  92 92   Resp:  16    Temp: 98.4 °F (36.9 °C) 98.4 °F (36.9 °C)    TempSrc:  Oral    SpO2:  96%    Weight:  190 lb (86.2 kg) Height:  5' 7\" (1.702 m)        The patient was given the following medications while in the emergency department:  Orders Placed This Encounter   Medications    acyclovir (ZOVIRAX) tablet 400 mg     Order Specific Question:   Antimicrobial Indications     Answer:   Skin and Soft Tissue Infection    ondansetron (ZOFRAN-ODT) disintegrating tablet 4 mg    diphenhydrAMINE (BENADRYL) injection 25 mg    acyclovir (ZOVIRAX) 400 MG tablet     Sig: Take 1 tablet by mouth 3 times daily for 10 days     Dispense:  50 tablet     Refill:  0     -------------------------  CRITICAL CARE:   CONSULTS: None  PROCEDURES: Procedures     FINAL IMPRESSION      1. Anal lesion    2.  Chronic left shoulder pain          DISPOSITION/PLAN   DISPOSITION Decision To Discharge 07/04/2022 04:28:57 PM      PATIENT REFERRED TO:  [place] [address] [employer] [address]  [phone]            DISCHARGE MEDICATIONS:  Discharge Medication List as of 7/4/2022  4:30 PM      START taking these medications    Details   acyclovir (ZOVIRAX) 400 MG tablet Take 1 tablet by mouth 3 times daily for 10 days, Disp-50 tablet, R-0Print               Kaitlin Hodges MD  Attending Emergency Physician                      Kaitlin Hodges MD  07/05/22 [id number]

## 2022-07-06 ENCOUNTER — HOSPITAL ENCOUNTER (EMERGENCY)
Age: 32
Discharge: HOME | End: 2022-07-06
Attending: EMERGENCY MEDICINE
Payer: MEDICARE

## 2022-07-06 VITALS
TEMPERATURE: 97.6 F | DIASTOLIC BLOOD PRESSURE: 72 MMHG | SYSTOLIC BLOOD PRESSURE: 129 MMHG | RESPIRATION RATE: 16 BRPM | HEART RATE: 83 BPM | OXYGEN SATURATION: 99 %

## 2022-07-06 VITALS
OXYGEN SATURATION: 98 % | RESPIRATION RATE: 16 BRPM | HEART RATE: 79 BPM | BODY MASS INDEX: 29.82 KG/M2 | WEIGHT: 190 LBS | SYSTOLIC BLOOD PRESSURE: 110 MMHG | HEIGHT: 67 IN | TEMPERATURE: 98.1 F | DIASTOLIC BLOOD PRESSURE: 62 MMHG

## 2022-07-06 DIAGNOSIS — F41.9 ANXIETY: Primary | ICD-10-CM

## 2022-07-06 DIAGNOSIS — Z76.5 MALINGERING: ICD-10-CM

## 2022-07-06 DIAGNOSIS — F32.0 CURRENT MILD EPISODE OF MAJOR DEPRESSIVE DISORDER, UNSPECIFIED WHETHER RECURRENT (HCC): Primary | ICD-10-CM

## 2022-07-06 PROCEDURE — 6370000000 HC RX 637 (ALT 250 FOR IP): Performed by: EMERGENCY MEDICINE

## 2022-07-06 PROCEDURE — 99283 EMERGENCY DEPT VISIT LOW MDM: CPT

## 2022-07-06 PROCEDURE — 99282 EMERGENCY DEPT VISIT SF MDM: CPT

## 2022-07-06 RX ORDER — ACETAMINOPHEN 500 MG
1000 TABLET ORAL ONCE
Status: DISCONTINUED | OUTPATIENT
Start: 2022-07-06 | End: 2022-07-06 | Stop reason: HOSPADM

## 2022-07-06 RX ORDER — GABAPENTIN 300 MG/1
300 CAPSULE ORAL ONCE
Status: COMPLETED | OUTPATIENT
Start: 2022-07-06 | End: 2022-07-06

## 2022-07-06 RX ADMIN — GABAPENTIN 300 MG: 300 CAPSULE ORAL at 10:11

## 2022-07-06 ASSESSMENT — ENCOUNTER SYMPTOMS
ABDOMINAL PAIN: 0
STRIDOR: 0
SHORTNESS OF BREATH: 0
COUGH: 0
WHEEZING: 0
EYE DISCHARGE: 0
COLOR CHANGE: 0
EYE PAIN: 0
VOMITING: 0
SORE THROAT: 0
DIARRHEA: 0
NAUSEA: 0
EYE REDNESS: 0
CONSTIPATION: 0
BACK PAIN: 0
COLOR CHANGE: 0
EYE PAIN: 0
ABDOMINAL PAIN: 0
SHORTNESS OF BREATH: 0

## 2022-07-06 ASSESSMENT — PAIN - FUNCTIONAL ASSESSMENT: PAIN_FUNCTIONAL_ASSESSMENT: 0-10

## 2022-07-06 NOTE — ED TRIAGE NOTES
Mode of arrival (squad #, walk in, police, etc) : walk in        [facility] (brief scenario, treatment PTA, etc). : Pt states he has bilateral foot nerve pain and mental health problems. Pt states he has a hole in his stomach from his gallbladder being removed. C= \"Have you ever felt that you should Cut down on your drinking? \"  No  A= \"Have people Annoyed you by criticizing your drinking? \"  No  G= \"Have you ever felt bad or Guilty about your drinking? \"  No  E= \"Have you ever had a drink as an Eye-opener first thing in the morning to steady your nerves or to help a hangover? \"  No      Deferred []      Reason for deferring: N/A    *If yes to two or more: probable alcohol abuse. *

## 2022-07-06 NOTE — ED NOTES
Louise Olson is a 32year old male who presents to the ED via a cab. Pt stating pt is suicidal. Pt has no plan or intent to harm self. Pt identifies pt's homelessness and \"everything going on in my life\" as the trigger to pt's SI. Pt was admitted to the [facility] 6/18/22- 6/21/22. Pt has a history of manipulative behaviors in order to be admitted to the hospital, obtain food and medications. Pt had a follow up appt at the [facility] 6/27/22 and did not go. When confronted about this, pt stated \"that was a fake appointement. \" Pt continues to not follow up on an outpatient basis and not taking medications. Pt to be discharged and encouraged to follow up with outpatient services.

## 2022-07-06 NOTE — ED PROVIDER NOTES
EMERGENCY DEPARTMENT ENCOUNTER    Pt Name: Juan Foote  MRN: [id number]  [place] 1990  Date of evaluation: 7/6/22  CHIEF COMPLAINT       Chief Complaint   Patient presents with    Mental Health Problem     HISTORY OF PRESENT ILLNESS   59-year-old male presents for reports of anxiety. Patient states that he has been feeling anxious. Patient states he has been feeling intermittently suicidal, denies any plan, denies any homicidal ideation, denies any visual or auditory hallucinations, denies any recent alcohol or drug use. Denies any other complaints at this time    The history is provided by the patient. REVIEW OF SYSTEMS     Review of Systems   Constitutional: Negative for chills and fever. HENT: Negative for congestion and ear pain. Eyes: Negative for pain. Respiratory: Negative for shortness of breath. Cardiovascular: Negative for chest pain, palpitations and leg swelling. Gastrointestinal: Negative for abdominal pain. Genitourinary: Negative for dysuria and flank pain. Musculoskeletal: Negative for back pain. Skin: Negative for color change. Neurological: Negative for numbness and headaches. Psychiatric/Behavioral: Negative for confusion. The patient is nervous/anxious. All other systems reviewed and are negative.     PASTMEDICAL HISTORY     Past Medical History:   Diagnosis Date    Anxiety     Arthritis     Asthma     Bipolar I disorder, most recent episode (or current) depressed, unspecified 9/12/2014    Clostridium difficile infection     COPD (chronic obstructive pulmonary disease) ([address]     Depression     Disease of blood and blood forming organ     Eczema     Fracture, metacarpal     R 4th and 5th    Gastric ulcer     Gastritis 06/13/2018    GERD (gastroesophageal reflux disease)     GI bleed     H. 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[address]     Neuromuscular disorder ([facility]     PFAPA syndrome (HCC)     PUD (peptic ulcer disease)     Rheumatoid arthritis ([address]     Rheumatoid arthritis(714.0)     Severe recurrent major depression without psychotic features ([facility] 11/21/2021    Sleep apnea     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[facility]     Substance abuse ([facility]     Hx of opiates, benzos, cocaine, alcohol abuse    Suicidal ideation     Suicide attempt by hanging ([facility]     Tobacco dependence     Ulcerative colitis ([facility]     UTI (urinary tract infection)      Past Problem List  Patient Active Problem List   Diagnosis Code    Opioid abuse ([facility] F11.10    Noncompliance Z91.19    Rectal bleeding K62.5    Smoker F17.200    Juvenile rheumatoid arthritis ([facility] M08.00    SRIRAM (obstructive sleep apnea) G47.33    Primary insomnia F51.01    Calculus of bile duct with acute on chronic cholecystitis K80.46    Mild intermittent asthma without complication T94.33    Gastritis K29.70    Generalized abdominal pain R10.84    Anemia D64.9    Elevated liver enzymes R74.8    Nausea and vomiting R11.2    Opioid type dependence, continuous (HCC) F11.20    Abdominal pain R10.9    Diarrhea R19.7    Irritable bowel syndrome with both constipation and diarrhea K58.2    Schizoaffective disorder, bipolar type ([address] F25.0    GI bleed K92.2    Depression with suicidal ideation F32. A, R45.851    Schizoaffective disorder ([facility] F25.9    MDD (major depressive disorder), recurrent severe, without psychosis ([address] F33.2    Severe episode of recurrent major depressive disorder, without psychotic features ([address] F33.2    Diabetes mellitus type 2 in obese ([address] E11.69, E66.9    Mixed hyperlipidemia E78.2    Cocaine abuse ([address] F14.10    Acute psychosis ([address] F23    PUD (peptic ulcer disease) K27.9    Gastroesophageal reflux disease without esophagitis K21.9    Prediabetes R73.03     SURGICAL HISTORY       Past Surgical History:   Procedure Laterality Date    ABDOMEN SURGERY      upper GI scope 7/7/2015    BRONCHOSCOPY      CHOLECYSTECTOMY, LAPAROSCOPIC  07/14/2017    surgery performed at [address] [place] [address], COLON, DIAGNOSTIC      OTHER SURGICAL HISTORY      lumbar puncture    MO COLONOSCOPY W/BIOPSY SINGLE/MULTIPLE  8/9/2017    COLONOSCOPY WITH BIOPSY performed by Mortimer Rodney, MD at [facility]    MO EGD TRANSORAL BIOPSY SINGLE/MULTIPLE N/A 3/20/2017    EGD BIOPSY performed by Carrie Simons MD at [facility]    MO ESOPHAGOGASTRODUODENOSCOPY TRANSORAL DIAGNOSTIC N/A 8/9/2017    EGD ESOPHAGOGASTRODUODENOSCOPY performed by Mortimer Rodney, MD at [address] [place] [address] N/A 3/20/2017    SIGMOIDOSCOPY DIAGNOSTIC FLEXIBLE performed by Carrie Simons MD at [place] [address]  2/4/16    UPPER GASTROINTESTINAL ENDOSCOPY N/A 6/13/2018    GASTRITIS    UPPER GASTROINTESTINAL ENDOSCOPY N/A 9/20/2018    EGD BIOPSY performed by Alcides Desouza MD at [address] [place] [address]       Discharge Medication List as of 7/6/2022  5:26 AM      CONTINUE these medications which have NOT CHANGED    Details   Buprenorphine HCl-Naloxone HCl (SUBOXONE SL) Place under the tongueHistorical Med      acyclovir (ZOVIRAX) 400 MG tablet Take 1 tablet by mouth 3 times daily for 10 days, Disp-50 tablet, R-0Print      Zinc Oxide 6 % CREA Apply 1 applicator topically in the morning and at bedtime, Disp-114 g, R-0Print      ARIPiprazole (ABILIFY) 15 MG tablet Take 1 tablet by mouth daily, Disp-30 tablet, R-3Normal      naloxone 4 MG/0.1ML LIQD nasal spray 1 spray by Nasal route as needed for Opioid Reversal, Disp-1 each, R-0Normal      cetirizine (ZYRTEC) 10 MG tablet Take 1 tablet by mouth daily, Disp-30 tablet, R-0Normal           ALLERGIES     is allergic to dicyclomine, famotidine, geodon [ziprasidone hcl], haloperidol, iv dye [iodides], reglan [metoclopramide], ibuprofen, aspirin, dicyclomine hcl, hydroxyzine hcl, iodine, metronidazole, mirtazapine, promethazine, and ziprasidone. FAMILY HISTORY     He indicated that his mother is alive. He indicated that his father is alive. He indicated that the status of his sister is unknown. He indicated that the status of his other is unknown. He indicated that the status of his paternal cousin is unknown. SOCIAL HISTORY       Social History     Tobacco Use    Smoking status: Current Every Day Smoker     Packs/day: 0.50     Years: 15.00     Pack years: 7.50     Types: Cigarettes    Smokeless tobacco: Never Used   Vaping Use    Vaping Use: Former   Substance Use Topics    Alcohol use: Not Currently     Comment: drinks daily    Drug use: Not Currently     Types: Opiates , Cocaine     Comment: Hx of opiates, benzos, cocaine, alcohol abuse     PHYSICAL EXAM     INITIAL VITALS: /62   Pulse 79   Temp 98.1 °F (36.7 °C)   Resp 16   Ht 5' 7\" (1.702 m)   Wt 190 lb (86.2 kg)   SpO2 98%   BMI 29.76 kg/m²    Physical Exam  Vitals and nursing note reviewed. Constitutional:       General: He is not in acute distress. Appearance: Normal appearance. He is not ill-appearing. HENT:      Head: Normocephalic and atraumatic. Right Ear: External ear normal.      Left Ear: External ear normal.      Nose: Nose normal.      Mouth/Throat:      Mouth: Mucous membranes are moist.   Eyes:      Extraocular Movements: Extraocular movements intact. Pupils: Pupils are equal, round, and reactive to light. Cardiovascular:      Rate and Rhythm: Normal rate and regular rhythm. Pulses: Normal pulses. Heart sounds: Normal heart sounds. Pulmonary:      Effort: Pulmonary effort is normal.      Breath sounds: Normal breath sounds. Abdominal:      General: Abdomen is flat. Palpations: Abdomen is soft. Tenderness: There is no abdominal tenderness. Musculoskeletal:         General: No tenderness. Normal range of motion. Cervical back: Neck supple.  No spinous process tenderness or muscular tenderness. Skin:     General: Skin is warm and dry. Capillary Refill: Capillary refill takes less than 2 seconds. Neurological:      General: No focal deficit present. Mental Status: He is alert and oriented to person, place, and time. Cranial Nerves: Cranial nerves are intact. Sensory: Sensation is intact. Motor: Motor function is intact. Psychiatric:         Behavior: Behavior normal.         Thought Content: Thought content includes suicidal ideation. Thought content does not include homicidal ideation. Thought content does not include homicidal or suicidal plan. MEDICAL DECISION MAKINyear old male presents for complaints of anixety. On initial exam patient in no acute distress, vitals are stable, patient voicing suicidal thoughts but no plan and no intent to act on them, patient has been seen multiple times in the ER for same complaints, do not feel he would benefit from an inpatient admission at this time and social work agrees with this assessment, will provide information for outpatient resources    Patient/Guardian was informed of their diagnosis and told to follow up with PCP & ZEPF in 1-3 days. Patient demonstrates understanding and agreement with the plan. They were given the opportunity to ask questions and those questions were answered to the best of our ability with the available information. Patient/Guardian told to return to the ED for any new, worsening, changing or persistent symptoms. This dictation was prepared using Blued voice recognition software.          CRITICAL CARE:       PROCEDURES:    Procedures    DIAGNOSTIC RESULTS   EKG:All EKG's are interpreted by the Emergency Department Physician who either signs or Co-signs this chart in the absence of a cardiologist.        RADIOLOGY:All plain film, CT, MRI, and formal ultrasound images (except ED bedside ultrasound) are read by the radiologist, see

## 2022-07-06 NOTE — ED NOTES
Clinical Summary:    Mary Taylor is a 32 y.o. male who presents to the ED for a psychiatric evaluation. Patient seen in the ED and discharged earlier today around 0530. Patient waited in the waiting room and then signed back in after a new shift arrived. Upon arrival this time Patient states \"I want to get admitted and go upstairs. I also want a shot of Benadryl and gabapentin for my nerve pain. \" Patient also begin requesting food. Patient later stated he had been having fleeting suicidal thoughts but denies any plan or intent at this time. Patient states he wants to get back on his medications. Writer asked patient what happened to his medications, patient states he never followed up with his outpatient appointment. Patient states he wants to get to [place] where he has more family. Patient denies auditory and visual hallucinations. Patient denies paranoid thoughts. Patient denies homicidal ideation. Patient is alert and oriented. Patient has a history of malingering per review of chart. Level of Care Disposition:    Writer consulted with ED physician. Patient to be discharged.  provided patient with brief patient prevention education interventions including follow-up outpatient treatment resources & recommendations, and lethal means counseling. Writer and patient discussed safety planning consisting of resources patient can use during or before a mental health crisis. The following service(s) is/are recommended for behavioral health follow-up:  Fatimah Ingram [phone] any time for support. Medications: Take Medications as prescribed. Let your physician know if you have any concerns. Recovery Help line for assistance with linkage to mental health and substance use services. Call 211. Return to Emergency Department if you have any further medical concerns. Patient given National suicide prevention line at 1-277.342.1711.   Patient is to follow-up with providers at [facility]. Patient agreeable to get a cab for the [facility] Crisis stabilization evaluation. Writer also provided patient with resources for food marie/ hot meals.

## 2022-07-06 NOTE — ED TRIAGE NOTES
Mode of arrival (squad #, walk in, police, etc) : walked in         Chief complaint(s): mental health problems skin     Arrival Note (brief scenario, treatment PTA, etc). : states sleeping all the areli want to see a doctor to help with feelings of suicidal states do not have plan at present         C= \"Have you ever felt that you should Cut down on your drinking? \"  No  A= \"Have people Annoyed you by criticizing your drinking? \"  Refused  G= \"Have you ever felt bad or Guilty about your drinking? \"  Refused  E= \"Have you ever had a drink as an Eye-opener first thing in the morning to steady your nerves or to help a hangover? \"  Refused      Deferred []      Reason for deferring: N/A    *If yes to two or more: probable alcohol abuse. *

## 2022-07-06 NOTE — ED PROVIDER NOTES
[address] ED  EMERGENCY DEPARTMENT ENCOUNTER      Pt Name: Mary Taylor  MRN: [id number]  [place] 1990  Date of evaluation: 7/6/2022  Provider: Lyndon Weber MD    CHIEF COMPLAINT       Chief Complaint   Patient presents with   [address] Problem    Suicidal        HISTORY OF PRESENT ILLNESS  (Location/Symptom, Timing/Onset, Context/Setting, Quality, Duration, Modifying Factors, Severity.)   Mary Taylor is a 32 y.o. male who presents to the emergency department feeling like he is having some paranoid thoughts. He relates he is feeling hungry and he tells me that he needs some gabapentin as well as something for pain, preferably a narcotic. He tells me he would also like something to help him keep medicines down like a Benadryl shot as well as some Phenergan. He was just discharged this morning from here after evaluation for the same complaints. He waited till the new shift arrived and then signed back in. He has been found to malinger in the past.  He does tell us that he would like to be admitted. He says that he has had intermittent suicidal thoughts but nothing at this time. He relates he is not on his medications but would like to get back on them. He would also like to get to [facility] where he has more family. Nursing Notes were reviewed. REVIEW OF SYSTEMS    (2-9 systems for level 4, 10 or more for level 5)     Review of Systems   Constitutional: Negative for activity change, appetite change, chills, fatigue and fever. HENT: Negative for congestion, ear pain and sore throat. Eyes: Negative for pain, discharge and redness. Respiratory: Negative for cough, shortness of breath, wheezing and stridor. Cardiovascular: Negative for chest pain. Gastrointestinal: Negative for abdominal pain, constipation, diarrhea, nausea and vomiting. Genitourinary: Negative for decreased urine volume and difficulty urinating.    Musculoskeletal: Negative for arthralgias and SINGLE/MULTIPLE  8/9/2017    COLONOSCOPY WITH BIOPSY performed by Brent Lennon MD at [facility]    OK EGD TRANSORAL BIOPSY SINGLE/MULTIPLE N/A 3/20/2017    EGD BIOPSY performed by Juana Mathis MD at [facility]    OK ESOPHAGOGASTRODUODENOSCOPY TRANSORAL DIAGNOSTIC N/A 8/9/2017    EGD ESOPHAGOGASTRODUODENOSCOPY performed by Brent Lennon MD at [facility] N/A 3/20/2017    SIGMOIDOSCOPY DIAGNOSTIC FLEXIBLE performed by Juana Mathis MD at [address]  2/4/16    UPPER GASTROINTESTINAL ENDOSCOPY N/A 6/13/2018    GASTRITIS    UPPER GASTROINTESTINAL ENDOSCOPY N/A 9/20/2018    EGD BIOPSY performed by Josiah Souza MD at [facility] Medication List as of 7/6/2022 10:09 AM      CONTINUE these medications which have NOT CHANGED    Details   Buprenorphine HCl-Naloxone HCl (SUBOXONE SL) Place under the tongueHistorical Med      acyclovir (ZOVIRAX) 400 MG tablet Take 1 tablet by mouth 3 times daily for 10 days, Disp-50 tablet, R-0Print      Zinc Oxide 6 % CREA Apply 1 applicator topically in the morning and at bedtime, Disp-114 g, R-0Print      ARIPiprazole (ABILIFY) 15 MG tablet Take 1 tablet by mouth daily, Disp-30 tablet, R-3Normal      naloxone 4 MG/0.1ML LIQD nasal spray 1 spray by Nasal route as needed for Opioid Reversal, Disp-1 each, R-0Normal      cetirizine (ZYRTEC) 10 MG tablet Take 1 tablet by mouth daily, Disp-30 tablet, R-0Normal             ALLERGIES     Dicyclomine, Famotidine, Geodon [ziprasidone hcl], Haloperidol, Iv dye [iodides], Reglan [metoclopramide], Ibuprofen, Aspirin, Dicyclomine hcl, Hydroxyzine hcl, Iodine, Metronidazole, Mirtazapine, Promethazine, and Ziprasidone    FAMILY HISTORY           Problem Relation Age of Onset    Diabetes Father     Alcohol Abuse Father     Depression Father     Arthritis Father     High Blood Pressure Father  Other Father         aneurysm & epilepsy    Migraines Father     Arthritis Mother     Other Mother         aneurysm & epilepsy    Migraines Mother     Diabetes Brother         Aunt and uncles    Depression Brother     Mental Illness Brother     Other Brother         epilepsy    Migraines Brother     Stroke Other         Uncle    Other Brother         murdered Oct 6th, 2014    Colon Cancer Paternal Cousin 43    Other Sister         epilepsy    Migraines Sister      Family Status   Relation Name Status    Father  Alive    Mother  Alive    Brother  (Not Specified)    Other  (Not Specified)   Danielle Brother  (Not Specified)   Larraine Aus  (Not Specified)    Sister  (Not Specified)        SOCIAL HISTORY      reports that he has been smoking cigarettes. He has a 7.50 pack-year smoking history. He has never used smokeless tobacco. He reports previous alcohol use. He reports previous drug use. Drugs: Opiates  and Cocaine. PHYSICAL EXAM    (up to 7 for level 4, 8 or more for level 5)     ED Triage Vitals [07/06/22 0929]   BP Temp Temp src Heart Rate Resp SpO2 Height Weight   129/72 97.6 °F (36.4 °C) -- 83 16 99 % -- --     Physical Exam  Vitals and nursing note reviewed. Constitutional:       General: He is not in acute distress. Appearance: He is well-developed. He is not ill-appearing, toxic-appearing or diaphoretic. HENT:      Head: Normocephalic and atraumatic. Right Ear: External ear normal.      Left Ear: External ear normal.      Nose: Nose normal.      Mouth/Throat:      Mouth: Mucous membranes are moist.   Eyes:      General:         Right eye: No discharge. Left eye: No discharge. Conjunctiva/sclera: Conjunctivae normal.      Pupils: Pupils are equal, round, and reactive to light. Cardiovascular:      Rate and Rhythm: Normal rate and regular rhythm. Heart sounds: Normal heart sounds. No murmur heard.       Pulmonary:      Effort: Pulmonary effort is normal. No respiratory distress. Breath sounds: Normal breath sounds. No wheezing, rhonchi or rales. Chest:      Chest wall: No tenderness. Abdominal:      General: Bowel sounds are normal. There is no distension. Palpations: Abdomen is soft. There is no mass. Tenderness: There is no abdominal tenderness. There is no guarding or rebound. Musculoskeletal:         General: Normal range of motion. Cervical back: Normal range of motion and neck supple. Right lower leg: No edema. Left lower leg: No edema. Skin:     General: Skin is warm. Findings: No rash. Neurological:      General: No focal deficit present. Mental Status: He is alert and oriented to person, place, and time. Motor: No abnormal muscle tone. Psychiatric:         Attention and Perception: Attention normal.         Mood and Affect: Mood normal.         Speech: Speech normal.         Behavior: Behavior normal.         Thought Content: Thought content does not include homicidal or suicidal ideation. DIAGNOSTIC RESULTS     EKG: All EKG's are interpreted by the Emergency Department Physician who either signs or Co-signs this chart in the absence of a cardiologist.    none    RADIOLOGY:   Non-plain film images such as CT, Ultrasound and MRI are read by the radiologist. Plain radiographic images are visualized and preliminarily interpreted by the emergency physician with the below findings:    Interpretation per the Radiologist below, if available at the time of this note:    No orders to display         ED BEDSIDE ULTRASOUND:   Performed by ED Physician - none    LABS:  Labs Reviewed - No data to display    All other labs were within normal range or not returned as of this dictation.     EMERGENCY DEPARTMENT COURSE and DIFFERENTIAL DIAGNOSIS/MDM:   Vitals:    Vitals:    07/06/22 0929   BP: 129/72   Pulse: 83   Resp: 16   Temp: 97.6 °F (36.4 °C)   SpO2: 99%     I think the patient seems to be at baseline and is likely able to be discharged.  who is also familiar with this patient feels the same. CONSULTS:  None    PROCEDURES:  None    FINAL IMPRESSION      1. Current mild episode of major depressive disorder, unspecified whether recurrent ([facility]    2.  Malingering          DISPOSITION/PLAN   DISPOSITION Decision To Discharge 07/06/2022 10:02:34 AM      PATIENT REFERRED TO:  PCP  in the next 24 hours          DISCHARGE MEDICATIONS:  Discharge Medication List as of 7/6/2022 10:09 AM          (Please note that portions of this note were completed with a voice recognition program.  Efforts were made to edit the dictations but occasionally words are mis-transcribed.)    Mary Sosa MD  Attending Emergency Physician        Mary Sosa MD  07/06/22 1640

## 2022-07-06 NOTE — ED NOTES
POC at this time includes discharge. Dr. Indu Alonzo encouraging patient to f/u with [facility]. Patient also encouraged to continue medications as prescribed by physician. Paperwork handed to patient. Ambulatory out of department with steady gait and personal belongings.       Sara Lopez RN  07/06/22 3318

## 2022-07-11 ENCOUNTER — HOSPITAL ENCOUNTER (EMERGENCY)
Age: 32
Discharge: HOME | End: 2022-07-11
Payer: MEDICARE

## 2022-07-11 ENCOUNTER — SOCIAL WORK (OUTPATIENT)
Dept: INTERNAL MEDICINE CLINIC | Age: 32
End: 2022-07-11

## 2022-07-11 VITALS
OXYGEN SATURATION: 100 % | WEIGHT: 190 LBS | BODY MASS INDEX: 28.14 KG/M2 | DIASTOLIC BLOOD PRESSURE: 81 MMHG | HEART RATE: 79 BPM | RESPIRATION RATE: 16 BRPM | HEIGHT: 69 IN | SYSTOLIC BLOOD PRESSURE: 129 MMHG | TEMPERATURE: 97.3 F

## 2022-07-11 DIAGNOSIS — L30.9 CHRONIC DERMATITIS: Primary | ICD-10-CM

## 2022-07-11 PROCEDURE — 6360000002 HC RX W HCPCS: Performed by: NURSE PRACTITIONER

## 2022-07-11 PROCEDURE — 96372 THER/PROPH/DIAG INJ SC/IM: CPT

## 2022-07-11 PROCEDURE — 99284 EMERGENCY DEPT VISIT MOD MDM: CPT

## 2022-07-11 RX ORDER — DIPHENHYDRAMINE HYDROCHLORIDE 50 MG/ML
25 INJECTION INTRAMUSCULAR; INTRAVENOUS ONCE
Status: COMPLETED | OUTPATIENT
Start: 2022-07-11 | End: 2022-07-11

## 2022-07-11 RX ORDER — TRIAMCINOLONE ACETONIDE 0.25 MG/G
OINTMENT TOPICAL
Qty: 80 G | Refills: 1 | Status: SHIPPED | OUTPATIENT
Start: 2022-07-11 | End: 2022-07-18

## 2022-07-11 RX ADMIN — DIPHENHYDRAMINE HYDROCHLORIDE 25 MG: 50 INJECTION, SOLUTION INTRAMUSCULAR; INTRAVENOUS at 06:52

## 2022-07-11 ASSESSMENT — PAIN - FUNCTIONAL ASSESSMENT: PAIN_FUNCTIONAL_ASSESSMENT: 0-10

## 2022-07-11 ASSESSMENT — ENCOUNTER SYMPTOMS
ABDOMINAL PAIN: 0
NAUSEA: 1
ABDOMINAL DISTENTION: 0
COLOR CHANGE: 0
VOMITING: 0

## 2022-07-11 ASSESSMENT — PAIN SCALES - GENERAL: PAINLEVEL_OUTOF10: 4

## 2022-07-11 ASSESSMENT — PAIN DESCRIPTION - LOCATION: LOCATION: ABDOMEN

## 2022-07-11 NOTE — ED PROVIDER NOTES
[facility] [address] [place] [address]       Chief Complaint   Patient presents with    Rash    Nausea       Nurses Notes reviewed and I agree except as noted in the HPI. HISTORY OF PRESENT ILLNESS    Aurora Faye is a 32 y.o. male who presents to the ED for evaluation of rash. Patient notes itchiness to arms and chest and back. Notes this is been an ongoing rash, per care everywhere has been seen at multiple different hospitals for similar rash. Notes dryness to the skin. Notes he recently got off of a bus here in our town, and is going to go to a primary care office today to establish care. Patient has history of depression, bipolar disorder, eczema, rheumatoid arthritis, substance abuse. He currently denies any suicidal or homicidal ideation. He requests a Benadryl injection for itching. He plans to get a dermatology referral from primary care provider. He denies any recent new exposures to his skin, denies seeing any insects on his skin, denies fevers or chills or recent illness. HPI was provided by the patient. REVIEW OF SYSTEMS     Review of Systems   Constitutional: Negative for activity change, chills, fatigue and fever. Gastrointestinal: Positive for nausea. Negative for abdominal distention, abdominal pain and vomiting. Skin: Positive for rash. Negative for color change and wound. Psychiatric/Behavioral: Negative for self-injury and suicidal ideas. The patient is not nervous/anxious.          PAST MEDICAL HISTORY     Past Medical History:   Diagnosis Date    Anxiety     Arthritis     Asthma     Bipolar I disorder, most recent episode (or current) depressed, unspecified 9/12/2014    Clostridium difficile infection     COPD [facility]chronic obstructive [facility] disease) ([facility]     Depression     Disease of blood and blood forming organ     Eczema     Fracture, metacarpal     R 4th and 5th    Gastric ulcer     Gastritis 06/13/2018    GERD (gastroesophageal reflux disease)     GI bleed     H. 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[facility]     Substance abuse ([facility]     Hx of opiates, benzos, cocaine, alcohol abuse    Suicidal ideation     Suicide attempt by hanging ([facility]     Tobacco dependence     Ulcerative colitis ([facility]     UTI (urinary tract infection)        SURGICALHISTORY      has a past surgical history that includes Colonoscopy; bronchoscopy; other surgical history; Upper gastrointestinal endoscopy (2/4/16); pr egd transoral biopsy single/multiple (N/A, 3/20/2017); sigmoidoscopy (N/A, 3/20/2017); Cholecystectomy, laparoscopic (07/14/2017); pr esophagogastroduodenoscopy transoral diagnostic (N/A, 8/9/2017); pr colonoscopy w/biopsy single/multiple (8/9/2017); Abdomen surgery; Upper gastrointestinal endoscopy (N/A, 6/13/2018); Upper gastrointestinal endoscopy (N/A, 9/20/2018); and Endoscopy, colon, diagnostic.     CURRENT MEDICATIONS       Previous Medications    ACYCLOVIR (ZOVIRAX) 400 MG TABLET    Take 1 tablet by mouth 3 times daily for 10 days    ARIPIPRAZOLE (ABILIFY) 15 MG TABLET    Take 1 tablet by mouth daily    BUPRENORPHINE HCL-NALOXONE HCL (SUBOXONE SL)    Place under the tongue    CETIRIZINE (ZYRTEC) 10 MG TABLET    Take 1 tablet by mouth daily    NALOXONE 4 MG/0.1ML LIQD NASAL SPRAY    1 spray by Nasal route as needed for Opioid Reversal    ZINC OXIDE 6 % CREA    Apply 1 applicator topically in the morning and at bedtime       ALLERGIES     is allergic to dicyclomine, famotidine, geodon [ziprasidone hcl], haloperidol, iv dye [iodides], reglan [metoclopramide], ibuprofen, aspirin, dicyclomine hcl, hydroxyzine hcl, iodine, metronidazole, mirtazapine, promethazine, and ziprasidone. FAMILY HISTORY     He indicated that his mother is alive. He indicated that his father is alive. He indicated that the status of his sister is unknown. He indicated that the status of his other is unknown. He indicated that the status of his paternal cousin is unknown.   family history includes Alcohol Abuse in his father; Arthritis in his father and mother; Colon Cancer (age of onset: 43) in his paternal cousin; Depression in his brother and father; Diabetes in his brother and father; High Blood Pressure in his father; Mental Illness in his brother; Migraines in his brother, father, mother, and sister; Other in his brother, brother, father, mother, and sister; Stroke in an other family member. SOCIAL HISTORY       Social History     Socioeconomic History    Marital status: Single     Spouse name: Not on file    Number of children: Not on file    Years of education: Not on file    Highest education level: Not on file   Occupational History    Occupation: disability   Tobacco Use    Smoking status: Current Every Day Smoker     Packs/day: 0.50     Years: 15.00     Pack years: 7.50     Types: Cigarettes    Smokeless tobacco: Never Used   Vaping Use    Vaping Use: Former   Substance and Sexual Activity    Alcohol use: Not Currently     Comment: drinks daily    Drug use: Not Currently     Types: Opiates , Cocaine     Comment: Hx of opiates, benzos, cocaine, alcohol abuse    Sexual activity: Yes     Partners: Female     Comment: Lives alone, not working.    Other Topics Concern    Not on file   Social History Narrative    ** Merged History Encounter **          Social Determinants of Health     Financial Resource Strain: Medium Risk    Difficulty of Paying Living Expenses: Somewhat hard   Food Insecurity: No Food Insecurity    Worried About Running Out of Food in the Last Year: Never true    Micaela of Food in the Last Year: Never true   Transportation Needs: No Transportation Needs    Lack of Transportation (Medical): No    Lack of Transportation (Non-Medical): No   Physical Activity: Inactive    Days of Exercise per Week: 0 days    Minutes of Exercise per Session: 0 min   Stress: Stress Concern Present    Feeling of Stress : Rather much   Social Connections: Socially Isolated    Frequency of Communication with Friends and Family: Never    Frequency of Social Gatherings with Friends and Family: Never    Attends Mu-ism Services: Never    Active Member of Clubs or Organizations: No    Attends Club or Organization Meetings: Never    Marital Status: Never    Intimate Partner Violence: Not At Risk    Fear of Current or Ex-Partner: No    Emotionally Abused: No    Physically Abused: No    Sexually Abused: No   Housing Stability: Low Risk     Unable to Pay for Housing in the Last Year: No    Number of [place] in the Last Year: 1    Unstable Housing in the Last Year: No       PHYSICAL EXAM     INITIAL VITALS:  height is 5' 9\" (1.753 m) and weight is 190 lb (86.2 kg). His oral temperature is 97.3 °F (36.3 °C). His blood pressure is 129/81 and his pulse is 79. His respiration is 16 and oxygen saturation is 100%. Physical Exam  Vitals and nursing note reviewed. Constitutional:       Appearance: Normal appearance. He is well-developed. HENT:      Head: Normocephalic. Right Ear: External ear normal.      Left Ear: External ear normal.      Nose: Nose normal.      Mouth/Throat:      Pharynx: Uvula midline. Eyes:      Conjunctiva/sclera: Conjunctivae normal.   Cardiovascular:      Rate and Rhythm: Normal rate and regular rhythm. Heart sounds: Normal heart sounds, S1 normal and S2 normal.   Pulmonary:      Effort: Pulmonary effort is normal. No respiratory distress. Breath sounds: Normal breath sounds. Chest:      Chest wall: No tenderness.    Abdominal:      General: Bowel sounds are normal. There is no distension. Palpations: Abdomen is soft. Tenderness: There is no abdominal tenderness. Musculoskeletal:         General: Normal range of motion. Cervical back: Normal range of motion and neck supple. Skin:     General: Skin is warm and dry. Coloration: Skin is not pale. Findings: Rash present. No erythema. Rash is papular. Comments: Diffuse papular rash to the arms chest back and forehead. Neurological:      Mental Status: He is alert and oriented to person, place, and time. Psychiatric:         Behavior: Behavior normal.         Thought Content: Thought content normal.         Judgment: Judgment normal.         DIFFERENTIAL DIAGNOSIS:   Dermatitis, contact dermatitis, eczema,  DIAGNOSTIC RESULTS       RADIOLOGY: non-plainfilm images(s) such as CT, Ultrasound and MRI are read by the radiologist.  Plain radiographic images are visualized and preliminarily interpreted by the emergency physician unless otherwise stated below. No orders to display         LABS:   Labs Reviewed - No data to display    EMERGENCY DEPARTMENT COURSE:   Vitals:    Vitals:    07/11/22 0631   BP: 129/81   Pulse: 79   Resp: 16   Temp: 97.3 °F (36.3 °C)   TempSrc: Oral   SpO2: 100%   Weight: 190 lb (86.2 kg)   Height: 5' 9\" (1.753 m)     MDM    Patient was seen and evaluated in the emergency department, patient appeared to be in no acute distress, vital signs reviewed, no significant findings were noted. Physical exam was completed, there is a papular diffuse rash appeared to be consistent with the patient's eczema in the past.  No focal patches were noted. Patient requested a injection of Benadryl, this was provided, will prescribe triamcinolone ointment, patient is advised to follow-up with primary care provider for repeat evaluation. Medications   diphenhydrAMINE (BENADRYL) injection 25 mg (25 mg IntraMUSCular Given 7/11/22 8345)       Patient was seenindependently by myself.  The patient's final impression and disposition and plan was determined by myself. CRITICAL CARE:   None    CONSULTS:  None    PROCEDURES:  None    FINAL IMPRESSION     1. Chronic dermatitis          DISPOSITION/PLAN   Patient discharged    PATIENT REFERREDTO:  MASSIEL Fagan - CNP  [address] [facility] [address]  [address] 83  [phone]    Call   For follow up and evaluation      DISCHARGE MEDICATIONS:  New Prescriptions    TRIAMCINOLONE (KENALOG) 0.025 % OINTMENT    Apply topically 2 times daily. (Please note that portions of this note were completed with a voice recognition program.  Efforts were made to edit the dictations but occasionally words are mis-transcribed.)    Provider:  I personally performed the services described in the documentation,reviewed and edited the documentation which was dictated to the scribe in my presence, and it accurately records my words and actions.     Tre Sutton CNP 07/11/22 6:55 AM    Cindra Romberg Counts, APRN - CNP        KonnectAgain, MASSIEL - CNP  07/11/22 0701

## 2022-07-11 NOTE — ED NOTES
Discharge instructions and follow up discussed with pt. Pt verbalized understanding and denied further questions. Pt discharged with all belongings.         Marilyn Moon RN  07/11/22 7226

## 2022-07-13 ENCOUNTER — HOSPITAL ENCOUNTER (EMERGENCY)
Age: 32
Discharge: HOME | End: 2022-07-13
Attending: STUDENT IN AN ORGANIZED HEALTH CARE EDUCATION/TRAINING PROGRAM
Payer: MEDICARE

## 2022-07-13 ENCOUNTER — APPOINTMENT (OUTPATIENT)
Dept: GENERAL RADIOLOGY | Age: 32
End: 2022-07-13
Payer: MEDICARE

## 2022-07-13 VITALS
HEART RATE: 100 BPM | HEIGHT: 67 IN | TEMPERATURE: 98 F | DIASTOLIC BLOOD PRESSURE: 91 MMHG | RESPIRATION RATE: 16 BRPM | WEIGHT: 184 LBS | OXYGEN SATURATION: 97 % | BODY MASS INDEX: 28.88 KG/M2 | SYSTOLIC BLOOD PRESSURE: 143 MMHG

## 2022-07-13 DIAGNOSIS — F41.1 ANXIETY STATE: ICD-10-CM

## 2022-07-13 DIAGNOSIS — M25.512 ACUTE PAIN OF LEFT SHOULDER: ICD-10-CM

## 2022-07-13 DIAGNOSIS — R21 RASH AND OTHER NONSPECIFIC SKIN ERUPTION: Primary | ICD-10-CM

## 2022-07-13 PROCEDURE — 73030 X-RAY EXAM OF SHOULDER: CPT

## 2022-07-13 PROCEDURE — 99283 EMERGENCY DEPT VISIT LOW MDM: CPT

## 2022-07-13 PROCEDURE — 99284 EMERGENCY DEPT VISIT MOD MDM: CPT

## 2022-07-13 PROCEDURE — 6360000002 HC RX W HCPCS: Performed by: STUDENT IN AN ORGANIZED HEALTH CARE EDUCATION/TRAINING PROGRAM

## 2022-07-13 PROCEDURE — 96372 THER/PROPH/DIAG INJ SC/IM: CPT

## 2022-07-13 RX ORDER — DIPHENHYDRAMINE HYDROCHLORIDE 50 MG/ML
25 INJECTION INTRAMUSCULAR; INTRAVENOUS ONCE
Status: COMPLETED | OUTPATIENT
Start: 2022-07-13 | End: 2022-07-13

## 2022-07-13 RX ADMIN — DIPHENHYDRAMINE HYDROCHLORIDE 25 MG: 50 INJECTION, SOLUTION INTRAMUSCULAR; INTRAVENOUS at 19:51

## 2022-07-13 RX ADMIN — DIPHENHYDRAMINE HYDROCHLORIDE 25 MG: 50 INJECTION, SOLUTION INTRAMUSCULAR; INTRAVENOUS at 20:38

## 2022-07-13 ASSESSMENT — PAIN DESCRIPTION - ORIENTATION: ORIENTATION: LEFT

## 2022-07-13 ASSESSMENT — PAIN - FUNCTIONAL ASSESSMENT
PAIN_FUNCTIONAL_ASSESSMENT: 0-10
PAIN_FUNCTIONAL_ASSESSMENT: 0-10

## 2022-07-13 ASSESSMENT — PAIN DESCRIPTION - LOCATION: LOCATION: SHOULDER

## 2022-07-13 ASSESSMENT — PAIN DESCRIPTION - PAIN TYPE: TYPE: ACUTE PAIN

## 2022-07-13 ASSESSMENT — PAIN DESCRIPTION - FREQUENCY: FREQUENCY: INTERMITTENT

## 2022-07-13 ASSESSMENT — PAIN SCALES - GENERAL
PAINLEVEL_OUTOF10: 5
PAINLEVEL_OUTOF10: 5

## 2022-07-13 ASSESSMENT — PAIN DESCRIPTION - DESCRIPTORS: DESCRIPTORS: ACHING

## 2022-07-13 NOTE — ED TRIAGE NOTES
Pt presents to the ED via lobby with c/o left shoulder pain. Pt states he had an injury a few months ago and continues to have pain. Pt also reports that he currently has a rash on his back.  In addition pt states that his anxiety has been worse and would like an outpatient prescription to help control his anxiety till he can follow up with his psychiatrist

## 2022-07-14 ENCOUNTER — HOSPITAL ENCOUNTER (EMERGENCY)
Age: 32
Discharge: HOME | End: 2022-07-14
Attending: EMERGENCY MEDICINE
Payer: MEDICARE

## 2022-07-14 ENCOUNTER — HOSPITAL ENCOUNTER (EMERGENCY)
Age: 32
Discharge: HOME | End: 2022-07-14
Payer: MEDICARE

## 2022-07-14 VITALS
OXYGEN SATURATION: 98 % | HEIGHT: 67 IN | HEART RATE: 90 BPM | RESPIRATION RATE: 20 BRPM | DIASTOLIC BLOOD PRESSURE: 77 MMHG | SYSTOLIC BLOOD PRESSURE: 141 MMHG | TEMPERATURE: 97.9 F | BODY MASS INDEX: 28.25 KG/M2 | WEIGHT: 180 LBS

## 2022-07-14 VITALS
SYSTOLIC BLOOD PRESSURE: 104 MMHG | HEART RATE: 75 BPM | TEMPERATURE: 98.7 F | OXYGEN SATURATION: 98 % | RESPIRATION RATE: 18 BRPM | DIASTOLIC BLOOD PRESSURE: 74 MMHG

## 2022-07-14 VITALS
TEMPERATURE: 98.3 F | HEART RATE: 102 BPM | OXYGEN SATURATION: 98 % | SYSTOLIC BLOOD PRESSURE: 119 MMHG | RESPIRATION RATE: 18 BRPM | DIASTOLIC BLOOD PRESSURE: 77 MMHG

## 2022-07-14 DIAGNOSIS — F41.9 ANXIETY: ICD-10-CM

## 2022-07-14 DIAGNOSIS — S46.912A STRAIN OF LEFT SHOULDER, INITIAL ENCOUNTER: ICD-10-CM

## 2022-07-14 DIAGNOSIS — F43.10 PTSD (POST-TRAUMATIC STRESS DISORDER): Primary | ICD-10-CM

## 2022-07-14 DIAGNOSIS — L29.9 ITCHING: Primary | ICD-10-CM

## 2022-07-14 DIAGNOSIS — Z71.1 FEARED COMPLAINT WITHOUT DIAGNOSIS: Primary | ICD-10-CM

## 2022-07-14 DIAGNOSIS — G47.00 INSOMNIA, UNSPECIFIED TYPE: ICD-10-CM

## 2022-07-14 DIAGNOSIS — Z76.5 DRUG-SEEKING BEHAVIOR: ICD-10-CM

## 2022-07-14 LAB
BASOPHILS # BLD: 0.5 %
BASOPHILS ABSOLUTE: 0 THOU/MM3 (ref 0–0.1)
EOSINOPHIL # BLD: 1.8 %
EOSINOPHILS ABSOLUTE: 0.1 THOU/MM3 (ref 0–0.4)
ERYTHROCYTE [DISTWIDTH] IN BLOOD BY AUTOMATED COUNT: 13.2 % (ref 11.5–14.5)
ERYTHROCYTE [DISTWIDTH] IN BLOOD BY AUTOMATED COUNT: 45.1 FL (ref 35–45)
FLU A ANTIGEN: NEGATIVE
FLU B ANTIGEN: NEGATIVE
HCT VFR BLD CALC: 40.5 % (ref 42–52)
HEMOGLOBIN: 13.1 GM/DL (ref 14–18)
IMMATURE GRANS (ABS): 0.02 THOU/MM3 (ref 0–0.07)
IMMATURE GRANULOCYTES: 0.2 %
LYMPHOCYTES # BLD: 35.8 %
LYMPHOCYTES ABSOLUTE: 3 THOU/MM3 (ref 1–4.8)
MCH RBC QN AUTO: 30.1 PG (ref 26–33)
MCHC RBC AUTO-ENTMCNC: 32.3 GM/DL (ref 32.2–35.5)
MCV RBC AUTO: 93.1 FL (ref 80–94)
MONOCYTES # BLD: 6.7 %
MONOCYTES ABSOLUTE: 0.6 THOU/MM3 (ref 0.4–1.3)
NUCLEATED RED BLOOD CELLS: 0 /100 WBC
PLATELET # BLD: 271 THOU/MM3 (ref 130–400)
PMV BLD AUTO: 9.5 FL (ref 9.4–12.4)
RBC # BLD: 4.35 MILL/MM3 (ref 4.7–6.1)
SARS-COV-2, NAAT: NOT  DETECTED
SEG NEUTROPHILS: 55 %
SEGMENTED NEUTROPHILS ABSOLUTE COUNT: 4.6 THOU/MM3 (ref 1.8–7.7)
WBC # BLD: 8.3 THOU/MM3 (ref 4.8–10.8)

## 2022-07-14 PROCEDURE — 36415 COLL VENOUS BLD VENIPUNCTURE: CPT

## 2022-07-14 PROCEDURE — 96372 THER/PROPH/DIAG INJ SC/IM: CPT

## 2022-07-14 PROCEDURE — 87804 INFLUENZA ASSAY W/OPTIC: CPT

## 2022-07-14 PROCEDURE — 6360000002 HC RX W HCPCS: Performed by: EMERGENCY MEDICINE

## 2022-07-14 PROCEDURE — 99202 OFFICE O/P NEW SF 15 MIN: CPT | Performed by: NURSE PRACTITIONER

## 2022-07-14 PROCEDURE — 87635 SARS-COV-2 COVID-19 AMP PRB: CPT

## 2022-07-14 PROCEDURE — 6360000002 HC RX W HCPCS: Performed by: NURSE PRACTITIONER

## 2022-07-14 PROCEDURE — 99215 OFFICE O/P EST HI 40 MIN: CPT

## 2022-07-14 PROCEDURE — 85025 COMPLETE CBC W/AUTO DIFF WBC: CPT

## 2022-07-14 RX ORDER — DIPHENHYDRAMINE HYDROCHLORIDE 50 MG/ML
25 INJECTION INTRAMUSCULAR; INTRAVENOUS ONCE
Status: COMPLETED | OUTPATIENT
Start: 2022-07-14 | End: 2022-07-14

## 2022-07-14 RX ORDER — DIPHENHYDRAMINE HYDROCHLORIDE 50 MG/ML
25 INJECTION INTRAMUSCULAR; INTRAVENOUS ONCE
Status: DISCONTINUED | OUTPATIENT
Start: 2022-07-14 | End: 2022-07-14

## 2022-07-14 RX ORDER — TIZANIDINE 4 MG/1
4 TABLET ORAL 2 TIMES DAILY PRN
Qty: 2 TABLET | Refills: 0 | Status: SHIPPED | OUTPATIENT
Start: 2022-07-14

## 2022-07-14 RX ORDER — CLONIDINE HYDROCHLORIDE 0.1 MG/1
0.1 TABLET ORAL NIGHTLY
Qty: 3 TABLET | Refills: 0 | Status: SHIPPED | OUTPATIENT
Start: 2022-07-14 | End: 2022-07-17

## 2022-07-14 RX ORDER — DIPHENHYDRAMINE HYDROCHLORIDE 50 MG/ML
50 INJECTION INTRAMUSCULAR; INTRAVENOUS ONCE
Status: COMPLETED | OUTPATIENT
Start: 2022-07-14 | End: 2022-07-14

## 2022-07-14 RX ADMIN — DIPHENHYDRAMINE HYDROCHLORIDE 25 MG: 50 INJECTION, SOLUTION INTRAMUSCULAR; INTRAVENOUS at 14:14

## 2022-07-14 RX ADMIN — DIPHENHYDRAMINE HYDROCHLORIDE 50 MG: 50 INJECTION, SOLUTION INTRAMUSCULAR; INTRAVENOUS at 19:52

## 2022-07-14 ASSESSMENT — ENCOUNTER SYMPTOMS
ABDOMINAL PAIN: 0
CHOKING: 0
SHORTNESS OF BREATH: 0
NAUSEA: 0
ABDOMINAL PAIN: 0
SORE THROAT: 0
EYE REDNESS: 0
CHEST TIGHTNESS: 0
CONSTIPATION: 0
WHEEZING: 0
COUGH: 0
ABDOMINAL PAIN: 0
SHORTNESS OF BREATH: 0
RHINORRHEA: 0
RHINORRHEA: 0
EYE REDNESS: 0
COUGH: 0
VOMITING: 0
DIARRHEA: 0
HYPERVENTILATION: 0
NAUSEA: 0
STRIDOR: 0
BACK PAIN: 0
COUGH: 0
VOMITING: 0
CHEST TIGHTNESS: 0
APNEA: 0

## 2022-07-14 ASSESSMENT — PAIN - FUNCTIONAL ASSESSMENT
PAIN_FUNCTIONAL_ASSESSMENT: NONE - DENIES PAIN
PAIN_FUNCTIONAL_ASSESSMENT: NONE - DENIES PAIN

## 2022-07-14 NOTE — ED NOTES
Pt to the ED via self. Patient presents with complaints of hurting all over. Patient is alert and oriented x 4. Respirations are regular and unlabored.      Jocelyne Sanderson RN  07/14/22 1920

## 2022-07-14 NOTE — ED TRIAGE NOTES
Pt to [facility] ambulatory for a medication refill. Pt arrived in [facility] 2-3 days ago from [facility]. Pt has been out of Xanax 0.5mg for several days.

## 2022-07-14 NOTE — ED PROVIDER NOTES
[facility] Emergency Department    CHIEF COMPLAINT       Chief Complaint   Patient presents with    Blood Infection       Nurses Notes reviewed and I agree except as noted in the HPI. HISTORY OF PRESENT ILLNESS    Solange Aragon anali 32 y.o. male who presents to the ED for evaluation of concern for a blood infection and anxiety. He was seen here earlier. He has anxiety. States he was offered a dose of vistaril and decided he does want that and is asking for an rx for vistaril. Denies any reason for concern about blood infection        HPI was provided by the patient    REVIEW OF SYSTEMS     Review of Systems   Constitutional: Negative for chills, fatigue and fever. HENT: Negative for congestion, ear discharge, ear pain, postnasal drip and rhinorrhea. Eyes: Negative for redness. Respiratory: Negative for cough and chest tightness. Cardiovascular: Negative for chest pain and leg swelling. Gastrointestinal: Negative for abdominal pain, nausea and vomiting. Genitourinary: Negative for difficulty urinating, dysuria, enuresis, flank pain and hematuria. Musculoskeletal: Negative for back pain and joint swelling. Skin: Negative for rash. Neurological: Negative for dizziness, light-headedness, numbness and headaches. Psychiatric/Behavioral: Negative for agitation, behavioral problems and confusion. The patient is nervous/anxious. All other systems negative except as noted. PAST MEDICAL HISTORY     Past Medical History:   Diagnosis Date    Anxiety     Arthritis     Asthma     Bipolar I disorder, most recent episode (or current) depressed, unspecified 9/12/2014    Clostridium difficile infection     COPD (chronic obstructive pulmonary disease) ([facility] [address]     Depression     Disease of blood and blood forming organ     Eczema     Fracture, metacarpal     R 4th and 5th    Gastric ulcer     Gastritis 06/13/2018    GERD (gastroesophageal reflux disease)     GI bleed     H. 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[address]     [facility]     Hx of opiates, benzos, cocaine, alcohol abuse    Suicidal ideation     Su[facility] attempt by hanging [facility]     Tobacco dependence     Ulcerative colitis ([facility] [address] [facility]     UTI (urinary tract infection)        SURGICALHISTORY      has a past surgical history that includes Colonoscopy; bronchoscopy; other surgical history; Upper gastrointestinal endoscopy (2/4/16); pr egd transoral biopsy single/multiple (N/A, 3/20/2017); sigmoidoscopy (N/A, 3/20/2017); Cholecystectomy, laparoscopic (07/14/2017); pr esophagogastroduodenoscopy transoral diagnostic (N/A, 8/9/2017); pr colonoscopy w/biopsy single/multiple (8/9/2017); Abdomen surgery; Upper gastrointestinal endoscopy (N/A, 6/13/2018); Upper gastrointestinal endoscopy (N/A, 9/20/2018); and Endoscopy, colon, diagnostic. CURRENT MEDICATIONS       Discharge Medication List as of 7/14/2022  1:14 AM      CONTINUE these medications which have NOT CHANGED    Details   diphenhydrAMINE (BENYLIN) 12.5 MG/5ML liquid Take 10 mLs by mouth nightly as needed for Itching, Disp-120 mL, R-0Print      triamcinolone (KENALOG) 0.025 % ointment Apply topically 2 times daily. , Disp-80 g, R-1, Normal      Buprenorphine HCl-Naloxone HCl (SUBOXONE SL) Place under the tongueHistorical Med      acyclovir (ZOVIRAX) 400 MG tablet Take 1 tablet by mouth 3 times daily for 10 days, Disp-50 tablet, R-0Print      Zinc Oxide 6 % CREA Apply 1 applicator topically in the morning and at bedtime, Disp-114 g, R-0Print      ARIPiprazole (ABILIFY) 15 MG tablet Take 1 tablet by mouth daily, Disp-30 tablet, R-3Normal      naloxone 4 MG/0.1ML LIQD nasal spray 1 spray by Nasal route as needed for Opioid Reversal, Disp-1 each, R-0Normal      cetirizine (ZYRTEC) 10 MG tablet Take 1 tablet by mouth daily, Disp-30 tablet, R-0Normal             ALLERGIES     is allergic to dicyclomine, famotidine, geodon [ziprasidone hcl], haloperidol, iv dye [iodides], reglan [metoclopramide], ibuprofen, aspirin, dicyclomine hcl, hydroxyzine hcl, iodine, metronidazole, mirtazapine, promethazine, and ziprasidone. FAMILY HISTORY     He indicated that his mother is alive. He indicated that his father is alive. He indicated that the status of his sister is unknown. He indicated that the status of his other is unknown. He indicated that the status of his paternal cousin is unknown.   family history includes Alcohol Abuse in his father; Arthritis in his father and mother; Colon Cancer (age of onset: 43) in his paternal cousin; Depression in his brother and father; Diabetes in his brother and father; High Blood Pressure in his father; Mental Illness in his brother; Migraines in his brother, father, mother, and sister; Other in his brother, brother, father, mother, and sister; Stroke in an other family member.     SOCIAL HISTORY       Social History     Socioeconomic History    Marital status: Single     Spouse name: Not on file    Number of children: Not on file    Years of education: Not on file    Highest education level: Not on file   Occupational History    Occupation: disability   Tobacco Use    Smoking status: Current Every Day Smoker     Packs/day: 0.50     Years: 15.00     Pack years: 7.50     Types: Cigarettes    Smokeless tobacco: Never Used   Vaping Use    Vaping Use: Former   Substance and Sexual Activity    Alcohol use: Not Currently     Comment: drinks daily    Drug use: Not Currently     Types: Opiates , Cocaine     Comment: Hx of opiates, benzos, cocaine, alcohol abuse    Sexual activity: Yes Partners: Female     Comment: Lives alone, not working. Other Topics Concern    Not on file   Social History Narrative    ** Merged History Encounter **          Social Determinants of Health     Financial Resource Strain: Medium Risk    Difficulty of Paying Living Expenses: Somewhat hard   Food Insecurity: No Food Insecurity    Worried About Running Out of Food in the Last Year: Never true    Micaela of Food in the Last Year: Never true   Transportation Needs: No Transportation Needs    Lack of Transportation (Medical): No    Lack of Transportation (Non-Medical): No   Physical Activity: Inactive    Days of Exercise per Week: 0 days    Minutes of Exercise per Session: 0 min   Stress: Stress Concern Present    Feeling of Stress : Rather much   Social Connections: Socially Isolated    Frequency of Communication with Friends and Family: Never    Frequency of Social Gatherings with Friends and Family: Never    Attends Catholic Services: Never    Active Member of Clubs or Organizations: No    Attends Club or Organization Meetings: Never    Marital Status: Never    Intimate Partner Violence: Not At Risk    Fear of Current or Ex-Partner: No    Emotionally Abused: No    Physically Abused: No    Sexually Abused: No   Housing Stability: Low Risk     Unable to Pay for Housing in the Last Year: No    Number of [place] in the Last Year: 1    Unstable Housing in the Last Year: No       PHYSICAL EXAM     INITIAL VITALS:  temperature is 98.3 °F (36.8 °C). His blood pressure is 119/77 and his pulse is 102 (abnormal). His respiration is 18 and oxygen saturation is 98%. Physical Exam  Constitutional:       Appearance: Normal appearance. He is well-developed. He is not ill-appearing. HENT:      Head: Normocephalic and atraumatic. Nose: Nose normal.      Mouth/Throat:      Mouth: Mucous membranes are moist.      Pharynx: Oropharynx is clear.    Eyes:      Conjunctiva/sclera: Conjunctivae normal.   Cardiovascular:      Rate and Rhythm: Normal rate. Pulses: Normal pulses. Pulmonary:      Effort: Pulmonary effort is normal.   Abdominal:      Palpations: Abdomen is soft. Musculoskeletal:         General: Normal range of motion. Cervical back: Normal range of motion. Skin:     General: Skin is warm and dry. Capillary Refill: Capillary refill takes less than 2 seconds. Neurological:      General: No focal deficit present. Mental Status: He is alert and oriented to person, place, and time. Psychiatric:         Mood and Affect: Mood is anxious. DIFFERENTIAL DIAGNOSIS:   Anxiety, malingering, drug seeking behavior    DIAGNOSTIC RESULTS     EKG: All EKG's are interpreted by the Emergency Department Physician who eithersigns or Co-signs this chart in the absence of a cardiologist.        RADIOLOGY: non-plainfilm images(s) such as CT, Ultrasound and MRI are read by the radiologist.  Plain radiographic images are visualized and preliminarily interpreted by the emergency physician unless otherwise stated below. No orders to display         LABS:   Labs Reviewed   CBC WITH AUTO DIFFERENTIAL - Abnormal; Notable for the following components:       Result Value    RBC 4.35 (*)     Hemoglobin 13.1 (*)     Hematocrit 40.5 (*)     RDW-SD 45.1 (*)     All other components within normal limits       EMERGENCY DEPARTMENT COURSE:   Vitals:    Vitals:    07/14/22 0012   BP: 119/77   Pulse: (!) 102   Resp: 18   Temp: 98.3 °F (36.8 °C)   SpO2: 98%                      ED Course as of 07/14/22 0635   Thu Jul 14, 2022   0122 After my evaluation the patient, I came back and discussed with the provider that saw him earlier. She already gave him Benadryl and wrote him a prescription for Benadryl. I went out and discussed that with the patient and explained that his chart says that he is allergic to Vistaril.   Patient said is not Vistaril is Atarax and when I said the Atarax and Vistaril were the same thing he then said no it was something else. I explained to the patient I would not be giving him any other medications. He then said that he needs something for the itching and I told him that he already received Benadryl which would help with the itching. Patient then decided to leave the department. [KJ]      ED Course User Index  [KJ] Herman Desouza, APRN - CNP        Internal Administration   First Dose COVID-19, PFIZER PURPLE top, DILUTE for use, (age 15 y+), 30mcg/0.3mL  09/03/2021   Second Dose           Last COVID Lab SARS-CoV-2 (no units)   Date Value   11/19/2021 Not Detected     SARS-CoV-2, NAAT (no units)   Date Value   09/12/2021 NOT DETECTED     SARS-CoV-2, Rapid (no units)   Date Value   04/05/2022 Not Detected            MDM    Patient was seen evaluated in the emergency room for anxiety and \"concern for a blood infection\". Patient is afebrile. Has no signs or symptoms of infection. I did obtain a CBC which was normal.  After reviewed the patient's chart and I discussed with the previous provider, the patient appears to be seeking benzodiazepines. He did asked me repeatedly for a prescription for something like Ativan. He apparently had done that to the previous provider. It is well-documented in his history that he will wait for shift change and come back and see the next provider. I explained to the patient that he listed Vistaril as an allergy therefore we will not be giving it to him. Advised that he already received Benadryl therefore others nothing further that I can give him. He was provided with a prescription for Benadryl from Dr. Cristian Blum. Patient will be discharged from the emergency room    No notes of EC Admission Criteria type on file. Medications - No data to display    Please note that the patient was evaluated during a pandemic. All efforts were made for HIPPA compliance as well as provision of appropriate care.       Patient was seen independently by myself. The patient's final impression and disposition and plan was determined by myself. Strict return precautions and follow up instructions were discussed with the patient prior to discharge, with which the patient agrees. Physical assessment findings, diagnostic testing(s) if applicable, and vital signs reviewed with patient/patient representative. Questions answered. Medications asdirected, including OTC medications for supportive care. Education provided on medications. Differential diagnosis(s) discussed with patient/patient representative. Home care/self care instructions reviewed withpatient/patient representative. Patient is to follow-up with family care provider in 2-3 days if no improvement. Patient is to go to the emergency department if symptoms worsen. Patient/patient representative isaware of care plan, questions answered, verbalizes understanding and is in agreement. CRITICAL CARE:   None    CONSULTS:  None    PROCEDURES:  None    FINAL IMPRESSION     1. Feared complaint without diagnosis    2. Anxiety    3. Drug-seeking behavior          DISPOSITION/PLAN   DISPOSITION Decision To Discharge 07/14/2022 01:42:27 AM      PATIENT REFERREDTO:  DR. Tiesha Acosta [address] [facility]    Schedule an appointment as soon as possible for a visit in 2 days  For follow up    [address] [place] [address]   [address] [address]  [phone]            DISCHARGE MEDICATIONS:  Discharge Medication List as of 7/14/2022  1:14 AM          (Please note that portions of this note were completed with a voice recognition program.  Efforts were made to edit the dictations but occasionally words are mis-transcribed.)         MASSIEL Jiang CNP, APRN - CNP  07/14/22 2995

## 2022-07-14 NOTE — PROGRESS NOTES
[facility] brought housing resources to patient room. Patient had departed prior to receiving housing resources.

## 2022-07-14 NOTE — ED NOTES
Pt unable to be found for discharge instruction/signature of discharge instructions. Registration staff state seeing pt leave ED.        [facility]  07/14/22 2211

## 2022-07-14 NOTE — ED PROVIDER NOTES
[facility] ENCOUNTER          Pt Name: John Parker  MRN: [id number]  [place] 1990  Date of evaluation: 7/13/2022  Physician: Elyssa Urbina MD    CHIEF COMPLAINT       Chief Complaint   Patient presents with    Rash    Shoulder Pain    Anxiety     History obtained from the patient, chart review. HISTORY OF PRESENT ILLNESS    HPI  John Parker is a 32 y.o. male with a history of GERD, type 2 diabetes, SRIRAM, schizoaffective disorder, polysubstance abuse who presents to the emergency department for evaluation of anxiety, rash, shoulder pain. Patient states that he has had a rash to his shoulders/low back that is consistent with his known eczema. Patient states that Benadryl he received the other day helped with his symptoms. He has been using triamcinolone. He said overall rash is improving but he is requesting additional Benadryl. Patient also reports increasing anxiety. He denies any SI/HI, auditory visual hallucinations. Patient with acute on chronic left shoulder pain. He states it has been hurting him for years sooner than injury but states over the past few weeks it has acutely worsened and he sometimes feels a \"popping out\". Patient states that he is working on establishing care with a PCP and they are going to refer him to psychiatry. Patient states \"I need 2 things from you today. One, a shot of Benadryl and two, alprazolam 0.5 mg 10 pills to go home with\". Patient states that he has been on this medication in the past but has not had it for a while. He states that he recently moved from [place] to be closer to family and he is currently in the process of establishing care. He denies drug use. The patient has no other acute complaints at this time. REVIEW OF SYSTEMS   Review of Systems   Constitutional: Negative for chills and fever. HENT: Negative for rhinorrhea and sore throat.     Eyes: Negative for redness and Every Day Smoker     Packs/day: 0.50     Years: 15.00     Pack years: 7.50     Types: Cigarettes    Smokeless tobacco: Never Used   Vaping Use    Vaping Use: Former   Substance Use Topics    Alcohol use: Not Currently     Comment: drinks daily    Drug use: Not Currently     Types: Opiates , Cocaine     Comment: Hx of opiates, benzos, cocaine, alcohol abuse         ALLERGIES     Allergies   Allergen Reactions    Dicyclomine Anaphylaxis    Famotidine Anaphylaxis    Geodon [Ziprasidone Hcl] Anaphylaxis    Haloperidol Anaphylaxis     Other reaction(s): throat swells  Throat swells    Other reaction(s): AOF, throat swells    Iv Dye [Iodides] Nausea And Vomiting and Rash     Ok to take with benadryl - itching only no rash or anaphylaxis  Needs benadryl.  Reglan [Metoclopramide] Anaphylaxis     Pt states is an allergy    Ibuprofen      Other reaction(s): GI Intolerance  Other reaction(s): AOF, GI Intolerance    Aspirin      Pressure in skin    Dicyclomine Hcl      08--allergy removed-pt sts today it is his allergy  Other reaction(s): Itching    Hydroxyzine Hcl      Reports throat swelling    Iodine      Other reaction(s): Hives, Itching, Nausea/Vomiting    Metronidazole Swelling and Other (See Comments)     Patient says he isn't allergic to this med 08-  Today 08- it is an allergy  02/13/19 states he is not allergic   Other reaction(s): GI Intolerance, Unknown by Patient    Mirtazapine     Promethazine Other (See Comments)     Shows medication seeking behavior. Likely to worsen symptoms (vomiting) to allow for Promethazine to be prescribed.      Ziprasidone Other (See Comments)     Other reaction(s): Hallucinations         FAMILY HISTORY     Family History   Problem Relation Age of Onset    Diabetes Father     Alcohol Abuse Father     Depression Father     Arthritis Father     High Blood Pressure Father     Other Father         aneurysm & epilepsy    Migraines Father     Arthritis Mother     Other Mother         aneurysm & epilepsy    Migraines Mother     Diabetes Brother         Aunt and uncles    Depression Brother     Mental Illness Brother     Other Brother         epilepsy    Migraines Brother     Stroke Other         Uncle    Other Brother         murdered Oct 6th, 2014    Colon Cancer Paternal Cousin 43    Other Sister         epilepsy    Migraines Sister          PREVIOUS RECORDS   Previous records reviewed:   Multiple previous ED visits in [place] and recent ED visit to this ED for rash. PHYSICAL EXAM     ED Triage Vitals [07/13/22 1842]   BP Temp Temp Source Heart Rate Resp SpO2 Height Weight   (!) 153/95 98 °F (36.7 °C) Oral (!) 119 16 95 % 5' 7\" (1.702 m) 184 lb (83.5 kg)     Initial vital signs and nursing assessment reviewed and abnormal from Tachycardia. Body mass index is 28.82 kg/m². Pulsoximetry is normal per my interpretation. Additional Vital Signs:  Vitals:    07/13/22 2021   BP: (!) 143/91   Pulse: 100   Resp: 16   Temp:    SpO2: 97%       Physical Exam  Constitutional:       General: He is not in acute distress. Appearance: Normal appearance. HENT:      Head: Normocephalic and atraumatic. Mouth/Throat:      Mouth: Mucous membranes are moist.   Eyes:      Extraocular Movements: Extraocular movements intact. Conjunctiva/sclera: Conjunctivae normal.      Pupils: Pupils are equal, round, and reactive to light. Cardiovascular:      Rate and Rhythm: Normal rate and regular rhythm. Pulses: Normal pulses. Heart sounds: Normal heart sounds. Pulmonary:      Effort: Pulmonary effort is normal.      Breath sounds: Normal breath sounds. Abdominal:      General: Abdomen is flat. Palpations: Abdomen is soft. Tenderness: There is no abdominal tenderness. Musculoskeletal:         General: Normal range of motion. Cervical back: Normal range of motion and neck supple.       Comments: Tenderness over the anterior and posterior shoulder. Some pain with passive and active range of motion but with full range of motion. Normal strength and sensation to the left upper extremity. 2+ radial pulses. Skin:     General: Skin is warm and dry. Capillary Refill: Capillary refill takes less than 2 seconds. Comments: Maculopapular rash to posterior shoulders and upper back   Neurological:      General: No focal deficit present. Mental Status: He is alert and oriented to person, place, and time. Psychiatric:         Mood and Affect: Mood is anxious. Speech: Speech normal.         Behavior: Behavior normal.         Thought Content: Thought content does not include homicidal or suicidal ideation. MEDICAL DECISION MAKING   Initial Assessment:   Ju Tello is a 32 y.o. male with a history of GERD, type 2 diabetes, SRIRAM, schizoaffective disorder, polysubstance abuse who presents to the emergency department for evaluation of anxiety, rash, shoulder pain. Patient is hemodynamically stable other than tachycardia that resolved without intervention. Differential diagnosis includes but is not limited to shoulder fracture versus dislocation, arthritis, rotator cuff injury, anxiety, eczema. Plan:    IM Benadryl   Had offered patient Vistaril however he states that he has an allergy that is \"throat swelling\" to Atarax so will not give at this time   Patient declined speaking with [facility]  as he states that he already has a psychiatrist he plans to follow-up with and is not currently feeling suicidal or homicidal.            ED RESULTS   Laboratory results:  Labs Reviewed - No data to display    Radiologic studies results:  XR SHOULDER LEFT (MIN 2 VIEWS)   Final Result   Impression:   1. Normal left shoulder. This document has been electronically signed by:  Parul Cortes MD on 07/13/2022    07:41 PM                ED COURSE   ED Medications administered this visit:   Medications diphenhydrAMINE (BENADRYL) injection 25 mg (25 mg IntraMUSCular Given 7/13/22 1951)   diphenhydrAMINE (BENADRYL) injection 25 mg (25 mg IntraMUSCular Given 7/13/22 2038)     Shoulder x-ray negative for acute process. Upon review of previous ED visits in [place], patient would frequently present multiple times in the day with documentation of patient re-presenting as soon as physicians/providers switch shifts. Patient has documented history of malingering in [facility] and appears to have recently come back to [facility] which he confirmed. Upon review of patient's PDMP score, he is not chronically on benzo so there is no concern for benzodiazepine withdrawal at this time. Discussed with patient that controlled substances including benzodiazepines were not appropriate to be starting him on and discharging him with out of the emergency department. He was instructed to follow-up with his psychiatrist as he is currently planning if he is concerned that he would require these medications. Patient then stating that he would settle for Ativan instead of alprazolam and further discussed with patient that these are similar medications and he has no indication to be starting them in the emergency department today. Patient repeatedly asking for benzodiazepines despite being told multiple times that he would not be prescribed them today. He is requesting prescription for liquid benadryl because he states that he cannot digest/absorb pills. Patient was given a prescription for Benadryl oral solution. He was instructed to follow-up with his primary doctor and his psychiatrist for further management of his anxiety and to get him started on medications that may help his symptoms. He was instructed to return if he develops any suicidal or homicidal ideation. Patient verbalized agreement with this plan and was discharged in stable condition.        MEDICATION CHANGES     Discharge Medication List as of 7/13/2022  8:36 PM      START taking these medications    Details   diphenhydrAMINE (BENYLIN) 12.5 MG/5ML liquid Take 10 mLs by mouth nightly as needed for Itching, Disp-120 mL, R-0Print               FINAL DISPOSITION     Final diagnoses:   Rash and other nonspecific skin eruption   Anxiety state   Acute pain of left shoulder     Condition: condition: stable  Dispo: Discharge to home      This transcription was electronically signed. It was dictated by use of voice recognition software and electronically transcribed. The transcription may contain errors not detected in proofreading.         Dao Romero MD  07/14/22 6244

## 2022-07-14 NOTE — ED TRIAGE NOTES
Pt to the ED for anxiety. Pt states he is concerned that he has a blood infection. Pt states does not have any symptoms.

## 2022-07-14 NOTE — ED PROVIDER NOTES
[facility]      CHIEF COMPLAINT       Chief Complaint   Patient presents with    Medication Refill     Xanax 0.5mg Bid prn       Nurses Notes reviewed and I agree except as noted in the HPI. HISTORY OFPRESENT ILLNESS   Yony Farias is a 32 y.o. The history is provided by the patient. No  was used. Mental Health Problem  Presenting symptoms: agitation and disorganized thought process    Presenting symptoms: no aggressive behavior, no bizarre behavior, no delusions, no depression, no disorganized speech, no hallucinations, no homicidal ideas, no paranoid behavior, no self-mutilation, no suicidal thoughts, no suicidal threats and no suicide attempt    Presenting symptoms comment:  Roldan Lindquist reports unable to sleep for several days increasing his racing thoughts.   Degree of incapacity (severity):  Mild  Onset quality:  Unable to specify (hospitalized in [place] last month)  Timing:  Intermittent  Progression:  Waxing and waning  Chronicity:  Chronic  Context: medication and recent medication change    Context: not alcohol use, not drug abuse, not noncompliant and not stressful life event    Context comment:  Requests ativan to help sleep  Treatment compliance:  Most of the time (reports most recent RX for suboxon was to high, he did not get the RX and has requested a lower dose from perscriber)  Relieved by:  Benzodiazepines  Worsened by:  Lack of sleep  Ineffective treatments:  Antidepressants  Associated symptoms: anxiety, decreased need for sleep, distractible, euphoric mood, insomnia and irritability    Associated symptoms: no abdominal pain, no anhedonia, no appetite change, no chest pain, no fatigue, no feelings of worthlessness, no headaches, no hypersomnia, no hyperventilation, no poor judgment, no school problems and no weight change    Risk factors: hx of mental illness and recent psychiatric admission    Risk factors: no family hx of brother and father; High Blood Pressure in his father; Mental Illness in his brother; Migraines in his brother, father, mother, and sister; Other in his brother, brother, father, mother, and sister; Stroke in an other family member. SOCIAL HISTORY     Patient  reports that he has been smoking cigarettes. He has a 7.50 pack-year smoking history. He has never used smokeless tobacco. He reports previous alcohol use. He reports previous drug use. Drugs: Opiates  and Cocaine. PHYSICAL EXAM     ED TRIAGE VITALS  BP: (!) 141/77, Temp: 97.9 °F (36.6 °C), Heart Rate: 90, Resp: 20, SpO2: 98 %  Physical Exam  Vitals and nursing note reviewed. Constitutional:       General: He is not in acute distress. Appearance: Normal appearance. He is normal weight. He is not ill-appearing, toxic-appearing or diaphoretic. HENT:      Head: Normocephalic and atraumatic. Right Ear: External ear normal.      Left Ear: External ear normal.   Eyes:      Extraocular Movements: Extraocular movements intact. Conjunctiva/sclera: Conjunctivae normal.   Pulmonary:      Effort: Pulmonary effort is normal. No respiratory distress. Musculoskeletal:         General: Normal range of motion. Left shoulder: Tenderness present. Arms:       Cervical back: Normal range of motion. Skin:     General: Skin is warm. Neurological:      General: No focal deficit present. Mental Status: He is alert and oriented to person, place, and time. Psychiatric:         Mood and Affect: Mood normal.         Behavior: Behavior normal.         Thought Content: Thought content normal.         Judgment: Judgment normal.         DIAGNOSTIC RESULTS   Labs:No results found for this visit on 07/14/22.     IMAGING:  No orders to display     URGENT CARE COURSE:     Vitals:    07/14/22 1345 07/14/22 1430   BP: 139/78 (!) 141/77   Pulse: 98 90   Resp: 16 20   Temp: 98.1 °F (36.7 °C) 97.9 °F (36.6 °C)   TempSrc: Temporal    SpO2: 96% 98% Weight: 180 lb (81.6 kg)    Height: 5' 7\" (1.702 m)        Medications   diphenhydrAMINE (BENADRYL) injection 25 mg (25 mg IntraMUSCular Given 7/14/22 3564)     PROCEDURES:  None  FINAL IMPRESSION      1. PTSD (post-traumatic stress disorder)    2. Insomnia, unspecified type    3. Strain of left shoulder, initial encounter        DISPOSITION/PLAN   Decision To Discharge      Eat a few servings of vegetables every day, drink lots of water, avoid processed food. Stretch and meditate daily, workout at least 3 times a week, rest and think positively.      PATIENT REFERRED TO:  MASSIEL Swift CNP  [address] [place], [place] [address]    Schedule an appointment as soon as possible for a visit       [address] [place] [id number]  Go in 1 day      DISCHARGE MEDICATIONS:  Discharge Medication List as of 7/14/2022  2:25 PM      START taking these medications    Details   cloNIDine (CATAPRES) 0.1 MG tablet Take 1 tablet by mouth nightly for 3 days, Disp-3 tablet, R-0Print      tiZANidine (ZANAFLEX) 4 MG tablet Take 1 tablet by mouth 2 times daily as needed (muscle spasm), Disp-2 tablet, R-0Print           Discharge Medication List as of 7/14/2022  2:25 PM          MASSIEL Torres CNP, APRN - CNP  07/14/22 5294

## 2022-07-15 NOTE — ED PROVIDER NOTES
[facility] COMPLAINT       Chief Complaint   Patient presents with    Generalized Body Aches       Nurses Notes reviewed and I agree except as noted in the HPI. HISTORY OF PRESENT ILLNESS    John Parker is a 32 y.o. male. It is noted that this patient was seen just prior to this visit at an urgent care and then in our emergency department yesterday as well as on the 11th, 10th, sixth, fifth, fourth, third, second of this month. The chief complaint is constantly changing. Initially he was complaining of some body aches. The nurses had put him for a triage of checking a COVID and influenza screen on him but he is not complaining of any fever or coughing. He complained of a rash in the perirectal area. He was complaining of some itching in his shoulders and his anterior posterior torso. He was requesting that we give him a shot of Benadryl. He also states that he ran out of his Xanax and Ativan. Patient appears to have quite an underlying history of psychiatric history. REVIEW OF SYSTEMS         No fever, no coughing, he has had abdominal pain over an unclear period of time including multiple prior CT scans. Remainder of review of systems is otherwise reviewed as negative.       PAST MEDICAL HISTORY    has a past medical history of Anxiety, Arthritis, Asthma, Bipolar I disorder, most recent episode (or current) depressed, unspecified, Clostridium difficile infection, COPD (chronic obstructive pulmonary disease) ([address] Depression, Disease of blood and blood forming organ, Eczema, Fracture, metacarpal, Gastric ulcer, Gastritis, GERD (gastroesophageal reflux disease), GI bleed, H. pylori infection, H/O blood clots, Head injury, Headache, Insomnia, Juvenile rheumatoid arthritis ([address] Neuromuscular disorder ([address] PFAPA syndrome ([address] PUD (peptic ulcer disease), Rheumatoid arthritis ([address] Rheumatoid arthritis(714.0), Severe recurrent major depression without psychotic features ([facility] Sleep apnea, 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[facility] Substance abuse ([facility] Suicidal ideation, Suicide attempt by hanging ([facility] Tobacco dependence, Ulcerative colitis ([facility] and UTI (urinary tract infection). SURGICAL HISTORY      has a past surgical history that includes Colonoscopy; bronchoscopy; other surgical history; Upper gastrointestinal endoscopy (2/4/16); pr egd transoral biopsy single/multiple (N/A, 3/20/2017); sigmoidoscopy (N/A, 3/20/2017); Cholecystectomy, laparoscopic (07/14/2017); pr esophagogastroduodenoscopy transoral diagnostic (N/A, 8/9/2017); pr colonoscopy w/biopsy single/multiple (8/9/2017); Abdomen surgery; Upper gastrointestinal endoscopy (N/A, 6/13/2018); Upper gastrointestinal endoscopy (N/A, 9/20/2018); and Endoscopy, colon, diagnostic. CURRENT MEDICATIONS       Previous Medications    ACYCLOVIR (ZOVIRAX) 400 MG TABLET    Take 1 tablet by mouth 3 times daily for 10 days    ARIPIPRAZOLE (ABILIFY) 15 MG TABLET    Take 1 tablet by mouth daily    CETIRIZINE (ZYRTEC) 10 MG TABLET    Take 1 tablet by mouth daily    CLONIDINE (CATAPRES) 0.1 MG TABLET    Take 1 tablet by mouth nightly for 3 days    DIPHENHYDRAMINE (BENYLIN) 12.5 MG/5ML LIQUID    Take 10 mLs by mouth nightly as needed for Itching    TIZANIDINE (ZANAFLEX) 4 MG TABLET    Take 1 tablet by mouth 2 times daily as needed (muscle spasm)    TRIAMCINOLONE (KENALOG) 0.025 % OINTMENT    Apply topically 2 times daily. ZINC OXIDE 6 % CREA    Apply 1 applicator topically in the morning and at bedtime       ALLERGIES     is allergic to dicyclomine, famotidine, geodon [ziprasidone hcl], haloperidol, iv dye [iodides], reglan [metoclopramide], ibuprofen, aspirin, dicyclomine hcl, hydroxyzine hcl, iodine, metronidazole, mirtazapine, promethazine, and ziprasidone. FAMILY HISTORY     He indicated that his mother is alive. He indicated that his father is alive.  He indicated that the status of his sister is unknown. He indicated that the status of his other is unknown. He indicated that the status of his paternal cousin is unknown.   family history includes Alcohol Abuse in his father; Arthritis in his father and mother; Colon Cancer (age of onset: 43) in his paternal cousin; Depression in his brother and father; Diabetes in his brother and father; High Blood Pressure in his father; Mental Illness in his brother; Migraines in his brother, father, mother, and sister; Other in his brother, brother, father, mother, and sister; Stroke in an other family member. SOCIAL HISTORY      reports that he has been smoking cigarettes. He has a 7.50 pack-year smoking history. He has never used smokeless tobacco. He reports previous alcohol use. He reports previous drug use. Drugs: Opiates  and Cocaine. PHYSICAL EXAM     INITIAL VITALS:  oral temperature is 98.7 °F (37.1 °C). His blood pressure is 104/74 and his pulse is 75. His respiration is 18 and oxygen saturation is 98%. Constitutional: Clinically nontoxic-appearing  Eyes:  Pupils are equal and reactive  HENT:  Atraumatic appearing  oropharynx moist, no pharyngeal exudates. Neck- normal range of motion, supple   Respiratory:  No wheezing, rhonchi or rales  Cardiovascular: regular  GI:  Non tender, no rigidity, rebound or guarding  : Normal-appearing perirectal exam with no obvious rash and no palpable perirectal abscess. Musculoskeletal:  2/4 distal pulses, no pitting edema  Back- no tenderness  Integument: Raised maculopapular rash on the upper torso which he is scratching at  Neurologic:  Alert, somewhat disorganized with speech. Steady gait.            DIAGNOSTIC RESULTS       LABS:   Labs Reviewed   COVID-19, RAPID   RAPID INFLUENZA A/B ANTIGENS       EMERGENCY DEPARTMENT COURSE:   Vitals:    Vitals:    07/14/22 1918   BP: 104/74   Pulse: 75   Resp: 18   Temp: 98.7 °F (37.1 °C)   TempSrc: Oral   SpO2: 98%     It has been noted on previous visits that he has been repeatedly asking for refills of benzodiazepine type medication. I do not feel comfortable prescribing that for him today. He did request a shot of Benadryl. This is a very safe drug any does appear to have some itching from his rash so we gave him the Benadryl. He listed an allergy to Vistaril so I am not able to write him a prescription for that tonight. He is advised to follow-up with a primary physician on an outpatient basis. CRITICAL CARE:   none      FINAL IMPRESSION      1. Itching    2.  Anxiety          DISPOSITION/PLAN   discharged    DISCHARGE MEDICATIONS:  New Prescriptions    No medications on file       (Please note that portions of this note were completed with a voice recognition program.  Efforts were made to edit the dictations but occasionally words are mis-transcribed.)    Ruel Santoyo, [address] [place] [address] DO Dharmesh  07/14/22 2050

## 2022-08-18 ENCOUNTER — HOSPITAL ENCOUNTER (EMERGENCY)
Age: 32
Discharge: HOME | End: 2022-08-18
Attending: STUDENT IN AN ORGANIZED HEALTH CARE EDUCATION/TRAINING PROGRAM
Payer: MEDICARE

## 2022-08-18 ENCOUNTER — APPOINTMENT (OUTPATIENT)
Dept: GENERAL RADIOLOGY | Age: 32
End: 2022-08-18
Payer: MEDICARE

## 2022-08-18 VITALS
OXYGEN SATURATION: 100 % | TEMPERATURE: 98.3 F | WEIGHT: 205 LBS | SYSTOLIC BLOOD PRESSURE: 133 MMHG | DIASTOLIC BLOOD PRESSURE: 89 MMHG | HEART RATE: 120 BPM | RESPIRATION RATE: 20 BRPM | HEIGHT: 67 IN | BODY MASS INDEX: 32.18 KG/M2

## 2022-08-18 DIAGNOSIS — F41.1 ANXIETY STATE: Primary | ICD-10-CM

## 2022-08-18 PROCEDURE — 71045 X-RAY EXAM CHEST 1 VIEW: CPT

## 2022-08-18 PROCEDURE — 93005 ELECTROCARDIOGRAM TRACING: CPT | Performed by: STUDENT IN AN ORGANIZED HEALTH CARE EDUCATION/TRAINING PROGRAM

## 2022-08-18 PROCEDURE — 6370000000 HC RX 637 (ALT 250 FOR IP): Performed by: STUDENT IN AN ORGANIZED HEALTH CARE EDUCATION/TRAINING PROGRAM

## 2022-08-18 PROCEDURE — 99284 EMERGENCY DEPT VISIT MOD MDM: CPT

## 2022-08-18 RX ORDER — DIPHENHYDRAMINE HCL 25 MG
25 TABLET ORAL ONCE
Status: COMPLETED | OUTPATIENT
Start: 2022-08-18 | End: 2022-08-18

## 2022-08-18 RX ORDER — DIPHENHYDRAMINE HCL 25 MG
25 CAPSULE ORAL EVERY 6 HOURS PRN
Qty: 10 CAPSULE | Refills: 0 | Status: SHIPPED | OUTPATIENT
Start: 2022-08-18 | End: 2022-08-22

## 2022-08-18 RX ORDER — LORAZEPAM 0.5 MG/1
0.5 TABLET ORAL ONCE
Status: COMPLETED | OUTPATIENT
Start: 2022-08-18 | End: 2022-08-18

## 2022-08-18 RX ADMIN — DIPHENHYDRAMINE HCL 25 MG: 25 TABLET ORAL at 22:29

## 2022-08-18 RX ADMIN — LORAZEPAM 0.5 MG: 0.5 TABLET ORAL at 22:29

## 2022-08-18 ASSESSMENT — PAIN - FUNCTIONAL ASSESSMENT: PAIN_FUNCTIONAL_ASSESSMENT: 0-10

## 2022-08-18 ASSESSMENT — PAIN DESCRIPTION - FREQUENCY: FREQUENCY: CONTINUOUS

## 2022-08-18 ASSESSMENT — PAIN DESCRIPTION - LOCATION: LOCATION: ABDOMEN

## 2022-08-18 ASSESSMENT — PAIN SCALES - GENERAL: PAINLEVEL_OUTOF10: 7

## 2022-08-19 ENCOUNTER — HOSPITAL ENCOUNTER (EMERGENCY)
Age: 32
Discharge: HOME | End: 2022-08-20
Attending: EMERGENCY MEDICINE
Payer: MEDICARE

## 2022-08-19 ENCOUNTER — APPOINTMENT (OUTPATIENT)
Dept: GENERAL RADIOLOGY | Age: 32
End: 2022-08-19
Payer: MEDICARE

## 2022-08-19 VITALS
RESPIRATION RATE: 20 BRPM | BODY MASS INDEX: 30.36 KG/M2 | TEMPERATURE: 98.5 F | HEART RATE: 113 BPM | WEIGHT: 205 LBS | OXYGEN SATURATION: 97 % | HEIGHT: 69 IN | SYSTOLIC BLOOD PRESSURE: 130 MMHG | DIASTOLIC BLOOD PRESSURE: 104 MMHG

## 2022-08-19 DIAGNOSIS — R07.9 CHEST PAIN, UNSPECIFIED TYPE: ICD-10-CM

## 2022-08-19 DIAGNOSIS — F41.1 ANXIETY STATE: ICD-10-CM

## 2022-08-19 DIAGNOSIS — E87.6 HYPOKALEMIA: Primary | ICD-10-CM

## 2022-08-19 LAB
ABSOLUTE EOS #: 0.2 K/UL (ref 0–0.4)
ABSOLUTE LYMPH #: 2.9 K/UL (ref 1–4.8)
ABSOLUTE MONO #: 0.7 K/UL (ref 0.1–1.3)
BASOPHILS # BLD: 1 % (ref 0–2)
BASOPHILS ABSOLUTE: 0 K/UL (ref 0–0.2)
EKG ATRIAL RATE: 121 BPM
EKG P AXIS: 51 DEGREES
EKG P-R INTERVAL: 136 MS
EKG Q-T INTERVAL: 302 MS
EKG QRS DURATION: 70 MS
EKG QTC CALCULATION (BAZETT): 428 MS
EKG R AXIS: 60 DEGREES
EKG T AXIS: 53 DEGREES
EKG VENTRICULAR RATE: 121 BPM
EOSINOPHILS RELATIVE PERCENT: 2 % (ref 0–4)
HCT VFR BLD CALC: 36.9 % (ref 41–53)
HEMOGLOBIN: 12.8 G/DL (ref 13.5–17.5)
INR BLD: 1.1
LYMPHOCYTES # BLD: 37 % (ref 24–44)
MCH RBC QN AUTO: 30.8 PG (ref 26–34)
MCHC RBC AUTO-ENTMCNC: 34.7 G/DL (ref 31–37)
MCV RBC AUTO: 89 FL (ref 80–100)
MONOCYTES # BLD: 10 % (ref 1–7)
PARTIAL THROMBOPLASTIN TIME: 24.7 SEC (ref 24–36)
PDW BLD-RTO: 13.5 % (ref 11.5–14.9)
PLATELET # BLD: 278 K/UL (ref 150–450)
PMV BLD AUTO: 7.6 FL (ref 6–12)
PROTHROMBIN TIME: 13.9 SEC (ref 11.8–14.6)
RBC # BLD: 4.15 M/UL (ref 4.5–5.9)
SEG NEUTROPHILS: 50 % (ref 36–66)
SEGMENTED NEUTROPHILS ABSOLUTE COUNT: 3.9 K/UL (ref 1.3–9.1)
WBC # BLD: 7.7 K/UL (ref 3.5–11)

## 2022-08-19 PROCEDURE — 36415 COLL VENOUS BLD VENIPUNCTURE: CPT

## 2022-08-19 PROCEDURE — 84100 ASSAY OF PHOSPHORUS: CPT

## 2022-08-19 PROCEDURE — 83690 ASSAY OF LIPASE: CPT

## 2022-08-19 PROCEDURE — 85025 COMPLETE CBC W/AUTO DIFF WBC: CPT

## 2022-08-19 PROCEDURE — 93005 ELECTROCARDIOGRAM TRACING: CPT

## 2022-08-19 PROCEDURE — 99285 EMERGENCY DEPT VISIT HI MDM: CPT

## 2022-08-19 PROCEDURE — 80076 HEPATIC FUNCTION PANEL: CPT

## 2022-08-19 PROCEDURE — 71045 X-RAY EXAM CHEST 1 VIEW: CPT

## 2022-08-19 PROCEDURE — 85610 PROTHROMBIN TIME: CPT

## 2022-08-19 PROCEDURE — 6360000002 HC RX W HCPCS: Performed by: EMERGENCY MEDICINE

## 2022-08-19 PROCEDURE — 85730 THROMBOPLASTIN TIME PARTIAL: CPT

## 2022-08-19 PROCEDURE — 96375 TX/PRO/DX INJ NEW DRUG ADDON: CPT

## 2022-08-19 PROCEDURE — 96374 THER/PROPH/DIAG INJ IV PUSH: CPT

## 2022-08-19 PROCEDURE — 83735 ASSAY OF MAGNESIUM: CPT

## 2022-08-19 PROCEDURE — 2580000003 HC RX 258: Performed by: EMERGENCY MEDICINE

## 2022-08-19 PROCEDURE — 84484 ASSAY OF TROPONIN QUANT: CPT

## 2022-08-19 PROCEDURE — 80048 BASIC METABOLIC PNL TOTAL CA: CPT

## 2022-08-19 RX ORDER — ONDANSETRON 2 MG/ML
4 INJECTION INTRAMUSCULAR; INTRAVENOUS ONCE
Status: COMPLETED | OUTPATIENT
Start: 2022-08-19 | End: 2022-08-19

## 2022-08-19 RX ORDER — DIPHENHYDRAMINE HYDROCHLORIDE 50 MG/ML
25 INJECTION INTRAMUSCULAR; INTRAVENOUS EVERY 6 HOURS PRN
Status: DISCONTINUED | OUTPATIENT
Start: 2022-08-19 | End: 2022-08-20 | Stop reason: HOSPADM

## 2022-08-19 RX ORDER — 0.9 % SODIUM CHLORIDE 0.9 %
500 INTRAVENOUS SOLUTION INTRAVENOUS ONCE
Status: COMPLETED | OUTPATIENT
Start: 2022-08-19 | End: 2022-08-20

## 2022-08-19 RX ADMIN — ONDANSETRON 4 MG: 2 INJECTION INTRAMUSCULAR; INTRAVENOUS at 23:42

## 2022-08-19 RX ADMIN — DIPHENHYDRAMINE HYDROCHLORIDE 25 MG: 50 INJECTION, SOLUTION INTRAMUSCULAR; INTRAVENOUS at 23:44

## 2022-08-19 RX ADMIN — SODIUM CHLORIDE 500 ML: 9 INJECTION, SOLUTION INTRAVENOUS at 23:46

## 2022-08-19 ASSESSMENT — ENCOUNTER SYMPTOMS
ABDOMINAL PAIN: 1
TROUBLE SWALLOWING: 0
COUGH: 0
EYE REDNESS: 0
SHORTNESS OF BREATH: 0
NAUSEA: 1
DIARRHEA: 1
PHOTOPHOBIA: 0
EYE DISCHARGE: 0
SHORTNESS OF BREATH: 1
VOMITING: 0
COLOR CHANGE: 0
ABDOMINAL PAIN: 1
COLOR CHANGE: 0

## 2022-08-19 ASSESSMENT — PAIN - FUNCTIONAL ASSESSMENT: PAIN_FUNCTIONAL_ASSESSMENT: 0-10

## 2022-08-19 ASSESSMENT — PAIN SCALES - GENERAL: PAINLEVEL_OUTOF10: 5

## 2022-08-19 NOTE — DISCHARGE INSTRUCTIONS
Follow-up with your psychiatrist.    Joss Olvera TO THE EMERGENCY DEPARTMENT IMMEDIATELY for worsening symptoms, persistent fever, nausea and/or vomiting, or if you develop any concerning symptoms such as: high fever not relieved by acetaminophen (Tylenol) and/or ibuprofen (Motrin / Advil), chills, shortness of breath, chest pain, feeling of your heart fluttering or racing, loss of consciousness, numbness, weakness or tingling in the arms or legs or change in color of the extremities, changes in mental status, persistent headache, blurry vision, loss of bladder / bowel control, unable to follow up with your physician, or other any other care or concern.

## 2022-08-19 NOTE — ED PROVIDER NOTES
[facility] ED  Emergency Department Encounter     Pt Name: Eagle Hicks  MRN: [id number]  [place] 1990  Date of evaluation: 8/19/22  PCP:  None Provider    CHIEF COMPLAINT       Chief Complaint   Patient presents with    Anxiety    Abdominal Pain    Chest Pain       HISTORY OFPRESENT ILLNESS  (Location/Symptom, Timing/Onset, Context/Setting, Quality, Duration, Modifying Factors,Severity.)      Eagle Hicks is a 32 y.o. male who presents with reported history of anxiety with recent incarceration and request for anxiety medication refill. He states that he has been out of his anxiety medication since he was reportedly admitted in the hospital at [place] [address] 1 month ago. Recently returned to [place] and has been going through a lot of stress with recent arrest.  Endorsing abdominal pain, chest pain, palpitations. States he has been receiving Benadryl and gel with minimal effect. He is also endorsing a generalized itchy skin rash. PAST MEDICAL / SURGICAL / SOCIAL / FAMILY HISTORY      has a past medical history of Anxiety, Arthritis, Asthma, Bipolar I disorder, most recent episode (or current) depressed, unspecified, Clostridium difficile infection, COPD (chronic obstructive pulmonary disease) ([address] Depression, Disease of blood and blood forming organ, Eczema, Fracture, metacarpal, Gastric ulcer, Gastritis, GERD (gastroesophageal reflux disease), GI bleed, H. pylori infection, H/O blood clots, Head injury, Headache, Insomnia, Juvenile rheumatoid arthritis ([address] Neuromuscular disorder ([address] PFAPA syndrome ([address] PUD (peptic ulcer disease), Rheumatoid arthritis ([address] Rheumatoid arthritis(714.0), Severe recurrent major depression without psychotic features ([address] Sleep apnea, 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[address] Substance abuse ([address] Suicidal ideation, Suicide attempt by hanging ([address] Tobacco dependence, Ulcerative colitis ([address] and UTI (urinary tract infection).      has a past surgical history that includes Colonoscopy; bronchoscopy; other surgical history; Upper gastrointestinal endoscopy (2/4/16); pr egd transoral biopsy single/multiple (N/A, 3/20/2017); sigmoidoscopy (N/A, 3/20/2017); Cholecystectomy, laparoscopic (07/14/2017); pr esophagogastroduodenoscopy transoral diagnostic (N/A, 8/9/2017); pr colonoscopy w/biopsy single/multiple (8/9/2017); Abdomen surgery; Upper gastrointestinal endoscopy (N/A, 6/13/2018); Upper gastrointestinal endoscopy (N/A, 9/20/2018); and Endoscopy, colon, diagnostic. Social History     Socioeconomic History    Marital status: Single     Spouse name: Not on file    Number of children: Not on file    Years of education: Not on file    Highest education level: Not on file   Occupational History    Occupation: disability   Tobacco Use    Smoking status: Every Day     Packs/day: 0.50     Years: 15.00     Pack years: 7.50     Types: Cigarettes    Smokeless tobacco: Never   Vaping Use    Vaping Use: Former   Substance and Sexual Activity    Alcohol use: Not Currently     Comment: drinks daily    Drug use: Not Currently     Types: Opiates , Cocaine     Comment: Hx of opiates, benzos, cocaine, alcohol abuse    Sexual activity: Yes     Partners: Female     Comment: Lives alone, not working. Other Topics Concern    Not on file   Social History Narrative    ** Merged History Encounter **          Social Determinants of Health     Financial Resource Strain: Medium Risk    Difficulty of Paying Living Expenses: Somewhat hard   Food Insecurity: No Food Insecurity    Worried About Running Out of Food in the Last Year: Never true    Ran Out of Food in the Last Year: Never true   Transportation Needs: No Transportation Needs    Lack of Transportation (Medical): No    Lack of Transportation (Non-Medical):  No   Physical Activity: Inactive    Days of Exercise per Week: 0 days    Minutes of Exercise per Session: 0 min   Stress: Stress Concern Present    Feeling of Stress : Rather much   Social Connections: Socially Isolated    Frequency of Communication with Friends and Family: Never    Frequency of Social Gatherings with Friends and Family: Never    Attends [employer] Services: Never    Active Member of Clubs or Organizations: No    Attends Club or Organization Meetings: Never    Marital Status: Never    Intimate Partner Violence: Not At Risk    Fear of Current or Ex-Partner: No    Emotionally Abused: No    Physically Abused: No    Sexually Abused: No   Housing Stability: Low Risk     Unable to Pay for Housing in the Last Year: No    Number of [place] in the Last Year: 1    Unstable Housing in the Last Year: No       Family History   Problem Relation Age of Onset    Diabetes Father     Alcohol Abuse Father     Depression Father     Arthritis Father     High Blood Pressure Father     Other Father         aneurysm & epilepsy    Migraines Father     Arthritis Mother     Other Mother         aneurysm & epilepsy    Migraines Mother     Diabetes Brother         Aunt and uncles    Depression Brother     Mental Illness Brother     Other Brother         epilepsy    Migraines Brother     Stroke Other         Uncle    Other Brother         murdered Oct 6th, 2014    Colon Cancer Paternal Cousin 43    Other Sister         epilepsy    Migraines Sister        Allergies:  Dicyclomine, Famotidine, Geodon [ziprasidone hcl], Haloperidol, Iv dye [iodides], Reglan [metoclopramide], Ibuprofen, Aspirin, Dicyclomine hcl, Hydroxyzine hcl, Iodine, Metronidazole, Mirtazapine, Promethazine, and Ziprasidone    Home Medications:  Prior to Admission medications    Medication Sig Start Date End Date Taking?  Authorizing Provider   diphenhydrAMINE (BENADRYL) 25 MG capsule Take 1 capsule by mouth every 6 hours as needed for Itching 8/18/22 8/28/22 Yes Pablo Nascimento DO   cloNIDine (CATAPRES) 0.1 MG tablet Take 1 tablet by mouth nightly for 3 days 7/14/22 7/17/22  Kaylah Mccain, APRN - CNP   tiZANidine (ZANAFLEX) 4 MG tablet Take 1 tablet by mouth 2 times daily as needed (muscle spasm) 7/14/22   MASSIEL Retana - CNP   Zinc Oxide 6 % CREA Apply 1 applicator topically in the morning and at bedtime 7/3/22   CHER Cruz MD   ARIPiprazole (ABILIFY) 15 MG tablet Take 1 tablet by mouth daily 6/22/22   Danette Zhao MD   cetirizine (ZYRTEC) 10 MG tablet Take 1 tablet by mouth daily 6/22/22   Danette Zhao MD   meloxicam (MOBIC) 7.5 MG tablet Take 1 tablet by mouth 2 times daily as needed for Pain 8/19/21 10/30/21  MASSIEL Roger CNP       REVIEW OF SYSTEMS    (2-9 systems for level 4, 10 or more for level 5)      Review of Systems   Constitutional:  Negative for chills and fever. Eyes:  Negative for discharge and redness. Respiratory:  Positive for shortness of breath. Cardiovascular:  Positive for chest pain and palpitations. Gastrointestinal:  Positive for abdominal pain. Genitourinary:  Negative for dysuria. Musculoskeletal:  Negative for arthralgias. Skin:  Positive for rash. Negative for color change. Allergic/Immunologic: Positive for environmental allergies. Neurological:  Negative for headaches. Psychiatric/Behavioral:  Negative for agitation and confusion. The patient is nervous/anxious. PHYSICAL EXAM   (up to 7 for level 4, 8 or more for level 5)     INITIAL VITALS:    height is 5' 7\" (1.702 m) and weight is 205 lb (93 kg). His oral temperature is 98.3 °F (36.8 °C). His blood pressure is 133/89 and his pulse is 120 (abnormal). His respiration is 20 and oxygen saturation is 100%. Physical Exam  Vitals and nursing note reviewed. Constitutional:       Appearance: He is well-developed. Comments: Anxious affect, speaking in full sentences   HENT:      Head: Normocephalic and atraumatic. Nose: Nose normal.      Mouth/Throat:      Mouth: Mucous membranes are moist.   Eyes:      General: No scleral icterus.      Conjunctiva/sclera: Conjunctivae normal. Pupils: Pupils are equal, round, and reactive to light. Cardiovascular:      Rate and Rhythm: Regular rhythm. Tachycardia present. Heart sounds: Normal heart sounds. No murmur heard. No friction rub. No gallop. Pulmonary:      Effort: Pulmonary effort is normal. No respiratory distress. Breath sounds: Normal breath sounds. No wheezing or rales. Musculoskeletal:         General: Normal range of motion. Comments: No external rash visualized   Skin:     General: Skin is warm and dry. Findings: No erythema or rash. Neurological:      Mental Status: He is alert and oriented to person, place, and time. DIFFERENTIAL  DIAGNOSIS     PLAN (LABS / IMAGING / EKG):  Orders Placed This Encounter   Procedures    XR CHEST PORTABLE    EKG 12 Lead       MEDICATIONS ORDERED:  Orders Placed This Encounter   Medications    LORazepam (ATIVAN) tablet 0.5 mg    diphenhydrAMINE (BENADRYL) tablet 25 mg    diphenhydrAMINE (BENADRYL) 25 MG capsule     Sig: Take 1 capsule by mouth every 6 hours as needed for Itching     Dispense:  10 capsule     Refill:  0       DDX: Anxiety versus PE versus pneumonia    Initial MDM/Plan: 32 y.o. male who presents with multiple reported complaints. I did review PDMP which showed no recent prescriptions for benzodiazepines. Extensive discussion had with patient who has had previous abuse of benzodiazepines. We will give 0.5 mg Ativan emergency department, oral Benadryl. He did asked multiple times for IM Benadryl. Have low suspicion for PE or other life-threatening and intrathoracic process. Does not appear in any acute distress however quite anxious. DIAGNOSTIC RESULTS / EMERGENCY DEPARTMENT COURSE / MDM     LABS:  Labs Reviewed - No data to display      RADIOLOGY:  XR CHEST PORTABLE    Result Date: 8/18/2022  EXAMINATION: ONE XRAY VIEW OF THE CHEST 8/18/2022 10:23 pm COMPARISON: 05/24/2022 HISTORY: Acute cough and fever. FINDINGS: Patient is rotated.   Normal cardiomediastinal silhouette. No acute airspace disease, pleural effusion, or pneumothorax. No acute abnormality. EKG  Rhythm: Sinus rhythm, but tachycardic  Rate: Tachycardic  Axis: normal  Conduction: normal  ST Segments: no acute change  T Waves: no acute change  Q Waves: no acute change    Clinical Impression: Sinus tachycardia    All EKG's are interpreted by the Emergency Department Physician who either signs or Co-signs this chart in the absence of a cardiologist.    EMERGENCY DEPARTMENT COURSE:  ED Course as of 08/19/22 0619   Thu Aug 18, 2022   2214 Continually requesting anxiety medication. Recent incarceration, states has had fever and cough, will get chest x-ray, outpatient psychiatry follow-up [MS]      ED Course User Index  [MS] Ronn Bosworth, DO     Chest x-ray negative given recent incarceration. Follow-up with primary care doctor. Based on the low acuity of concerning symptoms and improvement of symptoms, patient will be discharged with follow up and prescription information listed in the Disposition section. Patient states they will follow-up with primary care physician and/or return to the emergency department should they experience a change or worsening of symptoms. Patient will be discharged with resources: summary of visit, instructions, follow-up information, prescriptions if necessary. Patient/ family instructed to read discharge paperwork. All of their questions and concerns were addressed. Suspicion for any acute life-threatening processes is low. Patient voices understanding of plan. PROCEDURES:  None    CONSULTS:  None    CRITICAL CARE:  0    FINAL IMPRESSION      1. Anxiety state          DISPOSITION / PLAN     DISPOSITION Decision To Discharge 08/18/2022 10:43:10 PM    Discharge    PATIENTREFERRED TO:  No follow-up provider specified.     DISCHARGE MEDICATIONS:  Discharge Medication List as of 8/18/2022 10:44 PM        START taking these medications Details   diphenhydrAMINE (BENADRYL) 25 MG capsule Take 1 capsule by mouth every 6 hours as needed for Itching, Disp-10 capsule, R-0Print             Nydia Duncan DO  EmergencyMedicine Attending    (Please note that portions of this note were completed with a voice recognition program.  Efforts were made to edit the dictations but occasionally words are mis-transcribed.)       Nydia Duncan DO  08/19/22 [id number]

## 2022-08-20 ENCOUNTER — APPOINTMENT (OUTPATIENT)
Dept: CT IMAGING | Age: 32
End: 2022-08-20
Payer: MEDICARE

## 2022-08-20 LAB
ALBUMIN SERPL-MCNC: 4.4 G/DL (ref 3.5–5.2)
ALP BLD-CCNC: 51 U/L (ref 40–129)
ALT SERPL-CCNC: 9 U/L (ref 5–41)
ANION GAP SERPL CALCULATED.3IONS-SCNC: 13 MMOL/L (ref 9–17)
AST SERPL-CCNC: 17 U/L
BILIRUB SERPL-MCNC: 0.37 MG/DL (ref 0.3–1.2)
BILIRUBIN DIRECT: 0.12 MG/DL
BILIRUBIN, INDIRECT: 0.25 MG/DL (ref 0–1)
BUN BLDV-MCNC: 19 MG/DL (ref 6–20)
CALCIUM SERPL-MCNC: 9.3 MG/DL (ref 8.6–10.4)
CHLORIDE BLD-SCNC: 102 MMOL/L (ref 98–107)
CO2: 25 MMOL/L (ref 20–31)
CREAT SERPL-MCNC: 0.84 MG/DL (ref 0.7–1.2)
GFR AFRICAN AMERICAN: >60 ML/MIN
GFR NON-AFRICAN AMERICAN: >60 ML/MIN
GFR SERPL CREATININE-BSD FRML MDRD: ABNORMAL ML/MIN/{1.73_M2}
GLUCOSE BLD-MCNC: 122 MG/DL (ref 70–99)
LIPASE: 19 U/L (ref 13–60)
MAGNESIUM: 2.1 MG/DL (ref 1.6–2.6)
PHOSPHORUS: 3.9 MG/DL (ref 2.5–4.5)
POTASSIUM SERPL-SCNC: 3.4 MMOL/L (ref 3.7–5.3)
SODIUM BLD-SCNC: 140 MMOL/L (ref 135–144)
TOTAL PROTEIN: 7.1 G/DL (ref 6.4–8.3)
TROPONIN, HIGH SENSITIVITY: 7 NG/L (ref 0–22)
TROPONIN, HIGH SENSITIVITY: 7 NG/L (ref 0–22)

## 2022-08-20 PROCEDURE — 6370000000 HC RX 637 (ALT 250 FOR IP): Performed by: EMERGENCY MEDICINE

## 2022-08-20 PROCEDURE — 84484 ASSAY OF TROPONIN QUANT: CPT

## 2022-08-20 PROCEDURE — 36415 COLL VENOUS BLD VENIPUNCTURE: CPT

## 2022-08-20 RX ORDER — POTASSIUM CHLORIDE 20 MEQ/1
40 TABLET, EXTENDED RELEASE ORAL ONCE
Status: COMPLETED | OUTPATIENT
Start: 2022-08-20 | End: 2022-08-20

## 2022-08-20 RX ORDER — POTASSIUM CHLORIDE 20 MEQ/1
20 TABLET, EXTENDED RELEASE ORAL ONCE
Status: COMPLETED | OUTPATIENT
Start: 2022-08-20 | End: 2022-08-20

## 2022-08-20 RX ADMIN — POTASSIUM CHLORIDE 40 MEQ: 1500 TABLET, EXTENDED RELEASE ORAL at 04:08

## 2022-08-20 RX ADMIN — POTASSIUM CHLORIDE 20 MEQ: 1500 TABLET, EXTENDED RELEASE ORAL at 04:14

## 2022-08-20 NOTE — ED TRIAGE NOTES
Pt states he has been experiencing increased anxiety over the last several days- here today for benadryl.

## 2022-08-20 NOTE — ED NOTES
Patient dropped one of the 20 meq potassium pills and pharmacy informed and will send another pill     Faheem Truong RN  08/20/22 [id number]

## 2022-08-20 NOTE — ED PROVIDER NOTES
EMERGENCY DEPARTMENT ENCOUNTER    Pt Name: Opal Anderson  MRN: [id number]  [place] 1990  Date of evaluation: 8/19/22  CHIEF COMPLAINT       Chief Complaint   Patient presents with    Anxiety     Pt to ED for anxiety and subsequent chest pressure, requesting benadryl shot to get him to sleep through the night. Pt on phone with taxi service to get a ride home     HISTORY OF PRESENT ILLNESS   79-year-old male presents the ER complaining of anxiety, chest pain and abdominal pain. Patient also admits to multiple episodes of diarrhea. Patient states that he has had his gallbladder removed. Patient states that he normally gets this chest pain with anxiety. The history is provided by the patient. Chest Pain  Pain location:  Substernal area  Pain quality: aching    Pain radiates to:  L arm  Timing:  Constant  Chronicity:  New  Relieved by:  None tried  Worsened by:  Nothing  Ineffective treatments:  None tried  Associated symptoms: abdominal pain and nausea    Associated symptoms: no cough, no dizziness, no dysphagia, no fatigue, no fever, no palpitations, no shortness of breath and no vomiting          REVIEW OF SYSTEMS     Review of Systems   Constitutional:  Negative for activity change, fatigue and fever. HENT:  Negative for congestion, ear pain and trouble swallowing. Eyes:  Negative for photophobia and visual disturbance. Respiratory:  Negative for cough and shortness of breath. Cardiovascular:  Positive for chest pain. Negative for palpitations. Gastrointestinal:  Positive for abdominal pain, diarrhea and nausea. Negative for vomiting. Genitourinary:  Negative for dysuria, flank pain and urgency. Musculoskeletal:  Negative for arthralgias and myalgias. Skin:  Negative for color change and rash. Neurological:  Negative for dizziness and facial asymmetry. Psychiatric/Behavioral:  Negative for agitation and behavioral problems. The patient is nervous/anxious.     PASTMEDICAL HISTORY Past Medical History:   Diagnosis Date    Anxiety     Arthritis     Asthma     Bipolar I disorder, most recent episode (or current) depressed, unspecified 9/12/2014    Clostridium difficile infection     COPD (chronic obstructive pulmonary disease) ([facility]     Depression     Disease of blood and blood forming organ     Eczema     Fracture, metacarpal     R 4th and 5th    Gastric ulcer     Gastritis 06/13/2018    GERD (gastroesophageal reflux disease)     GI bleed     H. 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HCC)     PUD (peptic ulcer disease)     Rheumatoid arthritis ([facility]     Rheumatoid arthritis(714.0)     Severe recurrent major depression without psychotic features ([facility] 11/21/2021    Sleep apnea     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[facility]     Substance abuse ([facility]     Hx of opiates, benzos, cocaine, alcohol abuse    Suicidal ideation     Suicide attempt by hanging ([facility]     Tobacco dependence     Ulcerative colitis ([facility]     UTI (urinary tract infection)      Past Problem List  Patient Active Problem List   Diagnosis Code    Opioid abuse ([facility] F11.10    Noncompliance Z91.19    Rectal bleeding K62.5    Smoker F17.200    Juvenile rheumatoid arthritis ([facility] M08.00    SRIRAM (obstructive sleep apnea) G47.33    Primary insomnia F51.01    Calculus of bile duct with acute on chronic cholecystitis K80.46    Mild intermittent asthma without complication R48.45    Gastritis K29.70    Generalized abdominal pain R10.84    Anemia D64.9    Elevated liver enzymes R74.8    Nausea and vomiting R11.2    Opioid type dependence, continuous ([facility]) F11.20    Abdominal pain R10.9    Diarrhea R19.7    Irritable bowel syndrome with both constipation and diarrhea K58.2    Schizoaffective disorder, bipolar type ([facility] F25.0    GI bleed K92.2    Depression with suicidal ideation F32. A, R45.851    Schizoaffective disorder ([facility] F25.9    MDD (major depressive disorder), recurrent severe, without psychosis ([address] F33.2    Severe episode of recurrent major depressive disorder, without psychotic features ([address] F33.2    Diabetes mellitus type 2 in obese ([address] E11.69, E66.9    Mixed hyperlipidemia E78.2    Cocaine abuse ([address] F14.10    Acute psychosis ([address] F23    PUD (peptic ulcer disease) K27.9    Gastroesophageal reflux disease without esophagitis K21.9    Prediabetes R73.03     SURGICAL HISTORY       Past Surgical History:   Procedure Laterality Date    ABDOMEN SURGERY      upper GI scope 7/7/2015    BRONCHOSCOPY      CHOLECYSTECTOMY, LAPAROSCOPIC  07/14/2017    surgery performed at [facility], COLON, DIAGNOSTIC      OTHER SURGICAL HISTORY      lumbar puncture    TX COLONOSCOPY W/BIOPSY SINGLE/MULTIPLE  8/9/2017    COLONOSCOPY WITH BIOPSY performed by Bertha Isidro MD at [facility] EGD TRANSORAL BIOPSY SINGLE/MULTIPLE N/A 3/20/2017    EGD BIOPSY performed by Rosa M White MD at [facility]    TX ESOPHAGOGASTRODUODENOSCOPY TRANSORAL DIAGNOSTIC N/A 8/9/2017    EGD ESOPHAGOGASTRODUODENOSCOPY performed by Bertha Isidro MD at [facility] N/A 3/20/2017    SIGMOIDOSCOPY DIAGNOSTIC FLEXIBLE performed by Rosa M White MD at [address] [place] [address]  2/4/16    UPPER GASTROINTESTINAL ENDOSCOPY N/A 6/13/2018    GASTRITIS    UPPER GASTROINTESTINAL ENDOSCOPY N/A 9/20/2018    EGD BIOPSY performed by Diallo Cervantes MD at [address] [place] [address]       Discharge Medication List as of 8/20/2022  3:57 AM        CONTINUE these medications which have NOT CHANGED    Details   diphenhydrAMINE (BENADRYL) 25 MG capsule Take 1 capsule by mouth every 6 hours as needed for Itching, Disp-10 capsule, R-0Print      cloNIDine (CATAPRES) 0.1 MG tablet Take 1 tablet by mouth nightly for 3 days, Disp-3 tablet, R-0Print      tiZANidine (ZANAFLEX) 4 MG tablet Take 1 tablet by mouth 2 times daily as needed (muscle spasm), Disp-2 tablet, R-0Print      Zinc Oxide 6 % CREA Apply 1 applicator topically in the morning and at bedtime, Disp-114 g, R-0Print      ARIPiprazole (ABILIFY) 15 MG tablet Take 1 tablet by mouth daily, Disp-30 tablet, R-3Normal      cetirizine (ZYRTEC) 10 MG tablet Take 1 tablet by mouth daily, Disp-30 tablet, R-0Normal           ALLERGIES     is allergic to dicyclomine, famotidine, geodon [ziprasidone hcl], haloperidol, iv dye [iodides], reglan [metoclopramide], ibuprofen, aspirin, dicyclomine hcl, hydroxyzine hcl, iodine, metronidazole, mirtazapine, promethazine, and ziprasidone. FAMILY HISTORY     He indicated that his mother is alive. He indicated that his father is alive. He indicated that the status of his sister is unknown. He indicated that the status of his other is unknown. He indicated that the status of his paternal cousin is unknown. SOCIAL HISTORY       Social History     Tobacco Use    Smoking status: Every Day     Packs/day: 0.50     Years: 15.00     Pack years: 7.50     Types: Cigarettes    Smokeless tobacco: Never   Vaping Use    Vaping Use: Former   Substance Use Topics    Alcohol use: Not Currently     Comment: drinks daily    Drug use: Not Currently     Types: Opiates , Cocaine     Comment: Hx of opiates, benzos, cocaine, alcohol abuse     PHYSICAL EXAM     INITIAL VITALS: BP (!) 130/104   Pulse (!) 113   Temp 98.5 °F (36.9 °C) (Oral)   Resp 20   Ht 5' 9\" (1.753 m)   Wt 205 lb (93 kg)   SpO2 97%   BMI 30.27 kg/m²    Physical Exam  Constitutional:       General: He is not in acute distress. Appearance: Normal appearance. HENT:      Head: Normocephalic and atraumatic. Right Ear: External ear normal.      Left Ear: External ear normal.      Nose: Nose normal. No congestion or rhinorrhea. Eyes:      Extraocular Movements: Extraocular movements intact.       Pupils: Pupils are equal, round, and reactive to light. Cardiovascular:      Rate and Rhythm: Normal rate and regular rhythm. Pulses: Normal pulses. Heart sounds: Normal heart sounds. Pulmonary:      Effort: Pulmonary effort is normal. No respiratory distress. Breath sounds: Normal breath sounds. Abdominal:      General: Bowel sounds are normal.      Palpations: Abdomen is soft. Tenderness: There is abdominal tenderness (generalized). Musculoskeletal:         General: No deformity. Normal range of motion. Cervical back: Normal range of motion. No rigidity. Skin:     General: Skin is warm and dry. Neurological:      General: No focal deficit present. Mental Status: He is alert and oriented to person, place, and time. Mental status is at baseline. Psychiatric:         Mood and Affect: Mood normal.         Behavior: Behavior normal.       MEDICAL DECISION MAKING:   Patient with anxiety, chest pain and abdominal pain. Cardiac work-up in the ER did not display positive signs of acute cardiac ischemia. EKG was reviewed and interpreted by myself, ED physician. Patient refused CT of the abdomen pelvis. Lab work however did not display signs of acute abnormality. Patient instructed to follow-up with primary care provider within 2 days, 1 was provided for him. Patient instructed to return to the ER immediately if symptoms worsen or change. Patient understands and agrees with the plan. CRITICAL CARE:       PROCEDURES:    Procedures    DIAGNOSTIC RESULTS   EKG:All EKG's are interpreted by the Emergency Department Physician who either signs or Co-signs this chart in the absence of a cardiologist.        RADIOLOGY:All plain film, CT, MRI, and formal ultrasound images (except ED bedside ultrasound) are read by the radiologist, see reports below, unless otherwisenoted in MDM or here.   XR CHEST PORTABLE   Final Result   No acute cardiopulmonary process           LABS: All lab results were reviewed by

## 2022-08-22 ENCOUNTER — APPOINTMENT (OUTPATIENT)
Dept: CT IMAGING | Age: 32
End: 2022-08-22
Payer: MEDICARE

## 2022-08-22 ENCOUNTER — APPOINTMENT (OUTPATIENT)
Dept: GENERAL RADIOLOGY | Age: 32
End: 2022-08-22
Payer: MEDICARE

## 2022-08-22 ENCOUNTER — HOSPITAL ENCOUNTER (EMERGENCY)
Age: 32
Discharge: HOME | End: 2022-08-22
Attending: EMERGENCY MEDICINE
Payer: MEDICARE

## 2022-08-22 VITALS
RESPIRATION RATE: 18 BRPM | WEIGHT: 205 LBS | HEART RATE: 74 BPM | SYSTOLIC BLOOD PRESSURE: 111 MMHG | HEIGHT: 66 IN | OXYGEN SATURATION: 94 % | DIASTOLIC BLOOD PRESSURE: 61 MMHG | TEMPERATURE: 97.9 F | BODY MASS INDEX: 32.95 KG/M2

## 2022-08-22 DIAGNOSIS — R20.0 LEFT FACIAL NUMBNESS: Primary | ICD-10-CM

## 2022-08-22 LAB
ABSOLUTE EOS #: ABNORMAL K/UL
ABSOLUTE IMMATURE GRANULOCYTE: ABNORMAL K/UL (ref 0–0.3)
ABSOLUTE LYMPH #: ABNORMAL K/UL
ABSOLUTE MONO #: ABNORMAL K/UL
ACETAMINOPHEN LEVEL: <5 UG/ML (ref 10–30)
ANION GAP SERPL CALCULATED.3IONS-SCNC: 12 MMOL/L (ref 9–17)
BASOPHILS # BLD: ABNORMAL %
BASOPHILS ABSOLUTE: ABNORMAL K/UL (ref 0–0.2)
BUN BLDV-MCNC: 17 MG/DL (ref 6–20)
CALCIUM SERPL-MCNC: 8.9 MG/DL (ref 8.6–10.4)
CHLORIDE BLD-SCNC: 103 MMOL/L (ref 98–107)
CO2: 26 MMOL/L (ref 20–31)
CREAT SERPL-MCNC: 0.71 MG/DL (ref 0.7–1.2)
DIFFERENTIAL TYPE: ABNORMAL
EKG ATRIAL RATE: 86 BPM
EKG P AXIS: 63 DEGREES
EKG P-R INTERVAL: 138 MS
EKG Q-T INTERVAL: 362 MS
EKG QRS DURATION: 84 MS
EKG QTC CALCULATION (BAZETT): 433 MS
EKG R AXIS: 48 DEGREES
EKG T AXIS: 42 DEGREES
EKG VENTRICULAR RATE: 86 BPM
EOSINOPHILS RELATIVE PERCENT: ABNORMAL %
ETHANOL PERCENT: <0.01 %
ETHANOL: <10 MG/DL
GFR AFRICAN AMERICAN: >60 ML/MIN
GFR NON-AFRICAN AMERICAN: >60 ML/MIN
GFR SERPL CREATININE-BSD FRML MDRD: ABNORMAL ML/MIN/{1.73_M2}
GLUCOSE BLD-MCNC: 102 MG/DL (ref 70–99)
HCT VFR BLD CALC: 33.5 % (ref 40.7–50.3)
HCT VFR BLD CALC: ABNORMAL %
HEMOGLOBIN: 11.2 G/DL (ref 13–17)
HEMOGLOBIN: ABNORMAL G/DL
IMMATURE GRANULOCYTES: ABNORMAL %
INR BLD: ABNORMAL
LYMPHOCYTES # BLD: ABNORMAL %
MCH RBC QN AUTO: 30.4 PG (ref 25.2–33.5)
MCH RBC QN AUTO: ABNORMAL PG
MCHC RBC AUTO-ENTMCNC: 33.4 G/DL (ref 28.4–34.8)
MCHC RBC AUTO-ENTMCNC: ABNORMAL G/DL
MCV RBC AUTO: 91 FL (ref 82.6–102.9)
MCV RBC AUTO: ABNORMAL FL
MONOCYTES # BLD: ABNORMAL %
MYOGLOBIN: 22 NG/ML (ref 28–72)
NRBC AUTOMATED: 0 PER 100 WBC
NRBC AUTOMATED: ABNORMAL PER 100 WBC
PARTIAL THROMBOPLASTIN TIME: ABNORMAL SEC
PDW BLD-RTO: 12.9 % (ref 11.8–14.4)
PDW BLD-RTO: ABNORMAL %
PLATELET # BLD: 232 K/UL (ref 138–453)
PLATELET # BLD: ABNORMAL K/UL
PLATELET ESTIMATE: ABNORMAL
PMV BLD AUTO: 9.9 FL (ref 8.1–13.5)
PMV BLD AUTO: ABNORMAL FL
POTASSIUM SERPL-SCNC: 4.5 MMOL/L (ref 3.7–5.3)
PROTHROMBIN TIME: ABNORMAL SEC
RBC # BLD: 3.68 M/UL (ref 4.21–5.77)
RBC # BLD: ABNORMAL 10*6/UL
RBC # BLD: ABNORMAL M/UL
SALICYLATE LEVEL: <1 MG/DL (ref 3–10)
SARS-COV-2, RAPID: NOT DETECTED
SEG NEUTROPHILS: ABNORMAL %
SEGMENTED NEUTROPHILS ABSOLUTE COUNT: ABNORMAL K/UL
SODIUM BLD-SCNC: 141 MMOL/L (ref 135–144)
SPECIMEN DESCRIPTION: NORMAL
TOTAL CK: 136 U/L (ref 39–308)
TOXIC TRICYCLIC SC,BLOOD: NEGATIVE
TROPONIN, HIGH SENSITIVITY: <6 NG/L (ref 0–22)
WBC # BLD: 7.9 K/UL (ref 3.5–11.3)
WBC # BLD: ABNORMAL 10*3/UL
WBC # BLD: ABNORMAL K/UL

## 2022-08-22 PROCEDURE — 80048 BASIC METABOLIC PNL TOTAL CA: CPT

## 2022-08-22 PROCEDURE — 87635 SARS-COV-2 COVID-19 AMP PRB: CPT

## 2022-08-22 PROCEDURE — 93010 ELECTROCARDIOGRAM REPORT: CPT | Performed by: INTERNAL MEDICINE

## 2022-08-22 PROCEDURE — 83874 ASSAY OF MYOGLOBIN: CPT

## 2022-08-22 PROCEDURE — 84484 ASSAY OF TROPONIN QUANT: CPT

## 2022-08-22 PROCEDURE — 82553 CREATINE MB FRACTION: CPT

## 2022-08-22 PROCEDURE — 80179 DRUG ASSAY SALICYLATE: CPT

## 2022-08-22 PROCEDURE — 85730 THROMBOPLASTIN TIME PARTIAL: CPT

## 2022-08-22 PROCEDURE — 80307 DRUG TEST PRSMV CHEM ANLYZR: CPT

## 2022-08-22 PROCEDURE — 71046 X-RAY EXAM CHEST 2 VIEWS: CPT

## 2022-08-22 PROCEDURE — 93005 ELECTROCARDIOGRAM TRACING: CPT | Performed by: STUDENT IN AN ORGANIZED HEALTH CARE EDUCATION/TRAINING PROGRAM

## 2022-08-22 PROCEDURE — 99285 EMERGENCY DEPT VISIT HI MDM: CPT

## 2022-08-22 PROCEDURE — 96375 TX/PRO/DX INJ NEW DRUG ADDON: CPT

## 2022-08-22 PROCEDURE — 80143 DRUG ASSAY ACETAMINOPHEN: CPT

## 2022-08-22 PROCEDURE — 85027 COMPLETE CBC AUTOMATED: CPT

## 2022-08-22 PROCEDURE — 82550 ASSAY OF CK (CPK): CPT

## 2022-08-22 PROCEDURE — 96374 THER/PROPH/DIAG INJ IV PUSH: CPT

## 2022-08-22 PROCEDURE — 6360000002 HC RX W HCPCS: Performed by: STUDENT IN AN ORGANIZED HEALTH CARE EDUCATION/TRAINING PROGRAM

## 2022-08-22 PROCEDURE — 85025 COMPLETE CBC W/AUTO DIFF WBC: CPT

## 2022-08-22 PROCEDURE — 70450 CT HEAD/BRAIN W/O DYE: CPT

## 2022-08-22 PROCEDURE — G0480 DRUG TEST DEF 1-7 CLASSES: HCPCS

## 2022-08-22 PROCEDURE — 85610 PROTHROMBIN TIME: CPT

## 2022-08-22 RX ORDER — DIPHENHYDRAMINE HYDROCHLORIDE 50 MG/ML
12.5 INJECTION INTRAMUSCULAR; INTRAVENOUS ONCE
Status: DISCONTINUED | OUTPATIENT
Start: 2022-08-22 | End: 2022-08-22

## 2022-08-22 RX ORDER — METHYLPREDNISOLONE SODIUM SUCCINATE 125 MG/2ML
40 INJECTION, POWDER, LYOPHILIZED, FOR SOLUTION INTRAMUSCULAR; INTRAVENOUS ONCE
Status: DISCONTINUED | OUTPATIENT
Start: 2022-08-22 | End: 2022-08-22 | Stop reason: HOSPADM

## 2022-08-22 RX ORDER — DIPHENHYDRAMINE HCL 25 MG
25 CAPSULE ORAL NIGHTLY PRN
Qty: 1 CAPSULE | Refills: 0 | Status: SHIPPED | OUTPATIENT
Start: 2022-08-22 | End: 2022-08-25

## 2022-08-22 RX ORDER — DIPHENHYDRAMINE HYDROCHLORIDE 50 MG/ML
50 INJECTION INTRAMUSCULAR; INTRAVENOUS ONCE
Status: DISCONTINUED | OUTPATIENT
Start: 2022-08-22 | End: 2022-08-22 | Stop reason: HOSPADM

## 2022-08-22 RX ORDER — METHYLPREDNISOLONE SODIUM SUCCINATE 125 MG/2ML
40 INJECTION, POWDER, LYOPHILIZED, FOR SOLUTION INTRAMUSCULAR; INTRAVENOUS ONCE
Status: COMPLETED | OUTPATIENT
Start: 2022-08-22 | End: 2022-08-22

## 2022-08-22 RX ORDER — ONDANSETRON 2 MG/ML
4 INJECTION INTRAMUSCULAR; INTRAVENOUS ONCE
Status: COMPLETED | OUTPATIENT
Start: 2022-08-22 | End: 2022-08-22

## 2022-08-22 RX ADMIN — METHYLPREDNISOLONE SODIUM SUCCINATE 40 MG: 125 INJECTION, POWDER, FOR SOLUTION INTRAMUSCULAR; INTRAVENOUS at 07:25

## 2022-08-22 RX ADMIN — ONDANSETRON 4 MG: 2 INJECTION INTRAMUSCULAR; INTRAVENOUS at 07:08

## 2022-08-22 ASSESSMENT — ENCOUNTER SYMPTOMS
ABDOMINAL PAIN: 0
COUGH: 0
BACK PAIN: 0
SORE THROAT: 0
VOMITING: 0
SHORTNESS OF BREATH: 0

## 2022-08-22 ASSESSMENT — PAIN - FUNCTIONAL ASSESSMENT: PAIN_FUNCTIONAL_ASSESSMENT: 0-10

## 2022-08-22 ASSESSMENT — PAIN DESCRIPTION - LOCATION: LOCATION: CHEST

## 2022-08-22 ASSESSMENT — PAIN SCALES - GENERAL: PAINLEVEL_OUTOF10: 10

## 2022-08-22 NOTE — ED NOTES
Pt presents to the ED with C/O having a fall today. Pt stated that he is having withdrawals from his medication. Pt state that he hit a wall, pt did not LOC. Pt arrived with a left facial droop and lip droop. Pt is alert and oriented x 4. Pt stated that he has been having seizures. Pt was placed on full cardiac monitor. Will continue to monitor and reassess.     José Miguel Talavera RN  08/22/22 Lelo Brown RN  08/22/22 [id number]

## 2022-08-22 NOTE — DISCHARGE INSTRUCTIONS
Smoking cigarettes or being around anyone that smokes cigarettes can cause constriction of the blood vessels in the brain which in turn can cause you to have further headaches. For pain use acetaminophen (Tylenol) or ibuprofen (Motrin / Advil), unless prescribed medications that have acetaminophen or ibuprofen (or similar medications) in it. You can take over the counter acetaminophen tablets (1 - 2 tablets of the 500-mg strength every 6 hours) or ibuprofen tablets (2 tablets every 4 hours). Avoid taking narcotics for headaches (can cause a worse headache in several hours after taking the medication). Make sure that you drink plenty of water or Gatorade (or similar solution) to keep yourself hydrated.     PLEASE RETURN TO THE EMERGENCY DEPARTMENT IMMEDIATELY for worsening symptoms, change in vision / hearing / taste, ringing in your ears, loss of sensation or difficulty moving your arms or legs, or if you develop any concerning symptoms such as: high fever not relieved by acetaminophen (Tylenol) and/or ibuprofen (Motrin / Advil), chills, shortness of breath, chest pain, feeling of your heart fluttering or racing, persistent nausea and/or vomiting, vomiting up blood, blood in your stool, numbness, loss of consciousness, weakness or tingling in the arms or legs or change in color of the extremities, changes in mental status, persistent headache, blurry vision, loss of bladder / bowel control, unable to follow up with your physician, or other any other care or concern

## 2022-08-22 NOTE — ED NOTES
[facility] team at bedside, pt was not in room; upon return to room pt sts he wants to go and refusing IV to be placed. Dr Katy Santacruz updated and to bedside.        Maeve Lazo RN  08/22/22 4332

## 2022-08-22 NOTE — ED NOTES
Pt resting on cot with eyes closed and even RR noted. Pt awakes easily with verbal stimuli. Pt updated on plans of care. Pt requesting pain medication and \"itching\" medications. Message sent to resident awaiting orders. CT notified of beginning time of pre-medication for CT dye allergy reported.       Jaciel De La O RN  08/22/22 5798

## 2022-08-22 NOTE — ED PROVIDER NOTES
[facility]   Emergency Department  Faculty Attestation       I performed a history and physical examination of the patient and discussed management with the resident. I reviewed the residents note and agree with the documented findings including all diagnostic interpretations and plan of care. Any areas of disagreement are noted on the chart. I was personally present for the key portions of any procedures. I have documented in the chart those procedures where I was not present during the key portions. I have reviewed the emergency nurses triage note. I agree with the chief complaint, past medical history, past surgical history, allergies, medications, social and family history as documented unless otherwise noted below. Documentation of the HPI, Physical Exam and Medical Decision Making performed by sethibmargo is based on my personal performance of the HPI, PE and MDM. For Physician Assistant/ Nurse Practitioner cases/documentation I have personally evaluated this patient and have completed at least one if not all key elements of the E/M (history, physical exam, and MDM). Additional findings are as noted. Pertinent Comments     Primary Care Physician: No primary care provider on file. ED Triage Vitals [08/22/22 0551]   BP Temp Temp Source Heart Rate Resp SpO2 Height Weight   123/84 97.9 °F (36.6 °C) Oral 94 20 95 % 5' 6\" (1.676 m) 205 lb (93 kg)        History/Physical:   This is a 32 y.o. male who presents to the Emergency Department with complaint of feeling feverish and feeling \"off\". Patient is well known to the ED and is a poor historian at baseline and cannot tell me the exact sequence of events. However does state that the left sided extremity weakness started 2 days ago. Unaware of his facial droop so he can not pinpoint onset. He reports chest pain but no shortness of breaht. Has felt ill and uncertain if he ahd a fever. No eye pain. No rashes.     On exam patient is awake and alert answering questions approrpriately. Is reluctant to fully participate with exam.  NC/AT with left sided facial droop that involves upper and lower face. There is inability to left eyelidall the way on the left. Is able to close eyes though. No rash on the face and TMs clear. Heart sounds regular and lungs clear to auscultation. Abdomen is soft and nontender. Alert and oriented x4. On my exam patient does have the upper and lower facial droop. There was question of if he had some mild dysarthria however patient is well known to the ED and this is baseline speech. He had no drift or extingction on my exam.  No ataxia. No aphasia. MDM/Plan:   Initial complaints of left sided weakness. Resident did have weakness on the left side on her initial exam.   I examined patient ~ 15 minutes later and did not have any extremity weakness. However, did have upper and lower facial paralysis consistent with [place] Palsy. However given extremity findings will get stroke consult  Initially planned CT head and CTA h/n, but due to allergies may have to discuss option. Basic labs  Dispo per neuro team.   NIH during  my eval is 1 (However this is for facila droop which is noted above that it is upper and lower)  Not in window of thrombolytics as it has been 2 days. I did call and speak to physician at [facility] who saw patient a few days prior and he confirmed that patient did NOT have a facial droop at that time.      Critical Care: None     Elina Escamilla MD  Attending Emergency Physician         Elina Escamilla MD  08/27/22 2285

## 2022-08-22 NOTE — CONSULTS
[id number] [facility]   IN-PATIENT SERVICE    STROKE CONSULT  NOTE            Date:   8/22/2022  Patient name:  Anuj Rainey  Date of admission:  8/22/2022  YOB: 1990      Chief Complaint:     Chief Complaint   Patient presents with    Fall       Reason for Consult:      Left-sided facial asymmetry concern for Bell's palsy  Complaining of left upper and lower extremity numbness and weakness    History of Present Illness:     68-year-old male with past medical history of type 2 diabetes mellitus, schizoaffective disorder, opioid abuse, chronic smoking presented to ED with complaint of left-sided facial numbness and tingling sensation along with asymmetry with feeling weak on the left side also complains of left upper extremity and left lower extremity numbness and weakness ongoing for the past 2 days. Patient visited [facility] ED 2 days ago with complaint of anxiety chest pain and abdominal pain and multiple episodes of diarrhea requesting Benadryl for anxiety and insomnia. No facial asymmetry was noted at that time. Patient refused CT of the abdomen and pelvis and was discharged on Benadryl as needed and follow-up outpatient with primary care. As per patient for the past 2 days he has been having difficulty sleeping and feeling anxious and then he started noticing that his left side does not feel as much as compared to the right side. He had difficulty opening his left eye and noticed that his smile is also different. He also complained of mild to moderate headache bifrontal, aching with associated nausea. Patient is currently under house arrest, has an ankle bracelet. /84    On my evaluation, patient was awake, alert and oriented x3. Decreased frowning noticed on the left forehead, drooping of the left eyelid and left nasolabial fold flattening with asymmetric smile. Bilateral drift noticed in the upper extremities possible concern of poor effort.   Difficulty lifting left foot but on repeated stimulation able to lift it up with possible concern of poor effort. Complain of numbness over the left upper extremity and left lower extremity. NIH 5.     CT head without contrast was unremarkable for any acute abnormality or hypodensity. Patient has contrast dye allergy and requires Benadryl prior to that. Awaiting CTA. Plan to follow-up with stroke attending. If CTA w contrast negative patient can be started on prednisone and acyclovir for Bell's palsy with outpatient follow-up in clinic. On re-eval by neuro at bedside patient facial droop had resolved. Later he refused IV and insisted on leaving and was eventually discharged. Patient was advised to come back if symptoms worsens.       Prior to arrival patient was on  Antiplatelets/anticoagulants: None  Statins: none    Smoking history: everyday smoker 0.5 packs/day      Past Medical History:     Past Medical History:   Diagnosis Date    Anxiety     Arthritis     Asthma     Bipolar I disorder, most recent episode (or current) depressed, unspecified 9/12/2014    Clostridium difficile infection     COPD (chronic obstructive pulmonary disease) (HCC)     Depression     Disease of blood and blood forming organ     Eczema     Fracture, metacarpal     R 4th and 5th    Gastric ulcer     Gastritis 06/13/2018    GERD (gastroesophageal reflux disease)     GI bleed     H. 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[address]     Substance abuse ([address]     Hx of opiates, benzos, cocaine, alcohol abuse    Suicidal ideation     Suicide attempt by hanging ([address]     Tobacco dependence     Ulcerative colitis [facility])     UTI (urinary tract infection)         Past Surgical History:     Past Surgical History:   Procedure Laterality Date    ABDOMEN SURGERY      upper GI scope 7/7/2015    BRONCHOSCOPY      CHOLECYSTECTOMY, LAPAROSCOPIC  07/14/2017    surgery performed at [address], COLON, DIAGNOSTIC      OTHER SURGICAL HISTORY      lumbar puncture    MD COLONOSCOPY W/BIOPSY SINGLE/MULTIPLE  8/9/2017    COLONOSCOPY WITH BIOPSY performed by Jessica Concepcion MD at [address] [facility] EGD TRANSORAL BIOPSY SINGLE/MULTIPLE N/A 3/20/2017    EGD BIOPSY performed by Marine Sanchez MD at [facility]    MD ESOPHAGOGASTRODUODENOSCOPY TRANSORAL DIAGNOSTIC N/A 8/9/2017    EGD ESOPHAGOGASTRODUODENOSCOPY performed by Jessica Concepcion MD at [facility] N/A 3/20/2017    SIGMOIDOSCOPY DIAGNOSTIC FLEXIBLE performed by Marine Sanchez MD at [place] [address]  2/4/16    UPPER GASTROINTESTINAL ENDOSCOPY N/A 6/13/2018    GASTRITIS    UPPER GASTROINTESTINAL ENDOSCOPY N/A 9/20/2018    EGD BIOPSY performed by Chester Quiroz MD at [address] [place] [address]        Medications Prior to Admission:     Prior to Admission medications    Medication Sig Start Date End Date Taking?  Authorizing Provider   diphenhydrAMINE (BENADRYL) 25 MG capsule Take 1 capsule by mouth every 6 hours as needed for Itching 8/18/22 8/28/22  Pancho Mtz DO   cloNIDine (CATAPRES) 0.1 MG tablet Take 1 tablet by mouth nightly for 3 days 7/14/22 7/17/22  Landrum Coad, APRN - CNP   tiZANidine (ZANAFLEX) 4 MG tablet Take 1 tablet by mouth 2 times daily as needed (muscle spasm) 7/14/22   Landrum Coad, APRN - CNP   Zinc Oxide 6 % CREA Apply 1 applicator topically in the morning and at bedtime 7/3/22   CHER Perkins MD   ARIPiprazole (ABILIFY) 15 MG tablet Take 1 tablet by mouth daily 6/22/22   Sharon Monson MD   cetirizine (ZYRTEC) 10 MG tablet Take 1 tablet by mouth daily 6/22/22   Ema Cuevas MD   meloxicam (MOBIC) 7.5 MG tablet Take 1 tablet by mouth 2 times daily as needed for Pain 8/19/21 10/30/21  MASSIEL De La Rosa CNP        Allergies:     Dicyclomine, Famotidine, Geodon [ziprasidone hcl], Haloperidol, Iv dye [iodides], Reglan [metoclopramide], Ibuprofen, Aspirin, Dicyclomine hcl, Hydroxyzine hcl, Iodine, Metronidazole, Mirtazapine, Promethazine, and Ziprasidone    Social History:     Tobacco:    reports that he has been smoking cigarettes. He has a 7.50 pack-year smoking history. He has never used smokeless tobacco.  Alcohol:      reports that he does not currently use alcohol. Drug Use:  reports that he does not currently use drugs after having used the following drugs: Opiates  and Cocaine.     Family History:     Family History   Problem Relation Age of Onset    Diabetes Father     Alcohol Abuse Father     Depression Father     Arthritis Father     High Blood Pressure Father     Other Father         aneurysm & epilepsy    Migraines Father     Arthritis Mother     Other Mother         aneurysm & epilepsy    Migraines Mother     Diabetes Brother         Aunt and uncles    Depression Brother     Mental Illness Brother     Other Brother         epilepsy    Migraines Brother     Stroke Other         Uncle    Other Brother         murdered Oct 6th, 2014    Colon Cancer Paternal Cousin 43    Other Sister         epilepsy    Migraines Sister        Review of Systems:       Constitutional Negative for fever and chills   HEENT Negative for ear discharge, ear pain, nosebleed   Eyes Negative for photophobia, pain and discharge   Respiratory Negative for hemoptysis and sputum   Cardiovascular Negative for orthopnea, claudication and PND   Gastrointestinal Negative for abdominal pain, diarrhea, blood in stool   Musculoskeletal Negative for joint pain, negative for myalgia   Skin Negative for rash or itching   hematology Negative for ecchymosis, anemia   Psychiatric Negative for suicidal ideation, anxiety, depression, hallucinations       Physical Exam:   /84   Pulse 94   Temp 97.9 °F (36.6 °C) (Oral)   Resp 20   Ht 5' 6\" (1.676 m)   Wt 205 lb (93 kg)   SpO2 95%   BMI 33.09 kg/m²   Temp (24hrs), Av.9 °F (36.6 °C), Min:97.9 °F (36.6 °C), Max:97.9 °F (36.6 °C)        General examination:      General Appearance:  alert, well appearing, and in mild distress  HEENT: Normocephalic, atraumatic, moist mucus membranes  Neck: supple, no carotid bruits, (-) nuchal rigidity  Lungs:  Respirations unlabored, chest wall no deformity, BS normal  Cardiovascular: normal rate, regular rhythm  Abdomen: Soft, nontender, nondistended, normal bowel sounds  Skin: No gross lesions, rashes, bruising or bleeding on exposed skin area  Extremities:  peripheral pulses palpable, clubbing or edema  Psych: mildy anxious    NEUROLOGIC EXAMINATION    Mental status   Alert and oriented x 3; following all commands;   speech is fluent, no dysarthria, aphasia. Cranial nerves   II - visual fields intact to confrontation; pupils reactive  III, IV, VI - extraocular muscles intact; no MOO; no nystagmus; no ptosis   V - normal facial sensation                                                               VII - Decreased frowning noticed on the left forehead, drooping of the left eyelid and left nasolabial fold flattening with asymmetric smile. VIII - intact hearing       (no hyperscusis noticed)                                                                       IX, X - symmetrical palate elevation                                               XI - symmetrical shoulder shrug                                                       XII - midline tongue without atrophy or fasciculation     Motor function  Strength: Bilateral drift noticed in the upper extremities possible concern of poor effort.   Difficulty lifting left foot but on repeated stimulation able to lift it up with possible concern of poor effort. 5/5 RUE, 5/5 RLE  4/5 LUE, 4/5  LLE  Normal bulk and tone. Sensory function Intact to touch, pin, vibration, proprioception throughout  except decreased crude touch sensation in the LUE and LLE. Cerebellar Intact finger-nose-finger testing. Intact heel-shin testing. No dysdiadochokinesia present. No tremors                        Reflex function 2/4 symmetric throughout . Downgoing plantar response bilaterally. (-)Mccauley's sign bilaterally      Gait                  deffered         NIH STROKE SCALE:    Time Performed:  613 AM     1a. Level of consciousness:  0 - alert; keenly responsive  1b. Level of consciousness questions:  0 - answers both questions correctly  1c. Level of consciousness questions:  0 - performs both tasks correctly  2. Best Gaze:  0 - normal  3. Visual:  0 - no visual loss  4. Facial Palsy:  1 - minor paralysis (flattened nasolabial fold, asymmetric on smiling)  5a. Motor left arm:  1 - drift, limb holds 90 (or 45) degrees but drifts down before full 10 seconds: does not hit bed  5b. Motor right arm:  1 - drift, limb holds 90 (or 45) degrees but drifts down before full 10 seconds: does not hit bed  6a. Motor left le - drift; leg falls by the end of the 5 second period but does not hit bed  6b. Motor right le - no drift; leg holds 30 degree position for full 5 seconds  7. Limb Ataxia:  0 - absent  8. Sensory:  1 - mild to moderate sensory loss; patient feels pinprick is less sharp or is dull on the affected side; there is a loss of superficial pain with pinprick but patient is aware of being touched   9. Best Language:  0 - no aphasia, normal  10. Dysarthria:  0 - normal  11.   Extinction and Inattention:  0 - no abnormality    TOTAL:  5    Diagnostics:      Laboratory Testing:    CBC:   Recent Labs     22  2339   WBC 7.7   HGB 12.8*          BMP:    Recent Labs 08/19/22  2339      K 3.4*      CO2 25   BUN 19   CREATININE 0.84   GLUCOSE 122*         Lab Results   Component Value Date    CHOL 205 (H) 02/21/2017    LDLCHOLESTEROL 123 02/21/2017    HDL 44 02/21/2017    TRIG 189 (H) 02/21/2017    ALT 9 08/19/2022    AST 17 08/19/2022    TSH 0.59 03/28/2018    INR 1.1 08/19/2022    LABA1C 5.7 10/23/2014           Imaging/Diagnostics:      CT head: No acute intracranial abnormality. CTA head and neck pending      Impression:      40-year-old male with past medical history of type 2 diabetes mellitus, schizoaffective disorder, opioid abuse, chronic smoking presented to ED with complaint of left-sided facial numbness and tingling sensation along with asymmetry with feeling weak on the left side also complains of left upper extremity and left lower extremity numbness and weakness ongoing for the past 2 days. Ddx: Patient's symptoms are most likely consistent with left-sided facial Bell's palsy. Poor effort in the left upper extremity and left lower extremity could be secondary from anxiety. Plan:     Imaging   - CT Head  WO contrast: Unremarkable for any acute abnormality  -CT head and neck with contrast pending: Patient has allergy awaiting four hour prep protocol prior to clear    Medication   If CTA w contrast negative patient can be started on prednisone and acyclovir for Bell's palsy with outpatient follow-up in clinic. On re-eval by neuro at bedside patient facial droop had resolved. Later he refused IV and insisted on leaving and was eventually discharged. Patient was advised to come back if symptoms worsens.     Patient was discussed with the attending Dr. Kelli Ballard      Electronically signed by Ross Davila MD on 8/22/2022 at 6:31 AM    Ross Davila MD   Neurology Resident [facility]  8/22/2022 at 6:31 AM

## 2022-08-22 NOTE — ED NOTES
Pt on cot, pt states he is leaving. Awaiting discharge/orders, Dr Nuvia Nugent and Dr Augie Murguia updated.       Ernie Marrero RN  08/22/22 3227

## 2022-08-22 NOTE — ED PROVIDER NOTES
Handoff taken on the following patient from prior Attending Physician:    Pt Name: John Parker    PCP:  No primary care provider on file. Attestation    I was available and discussed any additional care issues that arose and coordinated the management plans with the resident(s) caring for the patient during my duty period. Any areas of disagreement with residents documentation of care or procedures are noted on the chart. I was personally present for the key portions of any/all procedures during my duty period. I have documented in the chart those procedures where I was not present during the key portions.     Patient is a 77-year-old male who has an unknown time of global weakness left-sided with left-sided facial droop patient is COVID-negative did have some possible left arm and left leg weakness however by the time of neuro evaluation this is improved patient is felt by neuro to be a Bell's however awaiting imaging at this time and neuro clearance     Calixto Rubi DO  08/22/22 [id number]

## 2022-08-22 NOTE — ED PROVIDER NOTES
[facility]), Substance abuse ([facility] [address] Suicidal ideation, Suicide attempt by hanging ([facility] [address] Tobacco dependence, Ulcerative colitis ([facility] [address] and UTI (urinary tract infection). has a past surgical history that includes Colonoscopy; bronchoscopy; other surgical history; Upper gastrointestinal endoscopy (2/4/16); pr egd transoral biopsy single/multiple (N/A, 3/20/2017); sigmoidoscopy (N/A, 3/20/2017); Cholecystectomy, laparoscopic (07/14/2017); pr esophagogastroduodenoscopy transoral diagnostic (N/A, 8/9/2017); pr colonoscopy w/biopsy single/multiple (8/9/2017); Abdomen surgery; Upper gastrointestinal endoscopy (N/A, 6/13/2018); Upper gastrointestinal endoscopy (N/A, 9/20/2018); and Endoscopy, colon, diagnostic. Social History     Socioeconomic History    Marital status: Single     Spouse name: Not on file    Number of children: Not on file    Years of education: Not on file    Highest education level: Not on file   Occupational History    Occupation: disability   Tobacco Use    Smoking status: Every Day     Packs/day: 0.50     Years: 15.00     Pack years: 7.50     Types: Cigarettes    Smokeless tobacco: Never   Vaping Use    Vaping Use: Former   Substance and Sexual Activity    Alcohol use: Not Currently     Comment: drinks daily    Drug use: Not Currently     Types: Opiates , Cocaine     Comment: Hx of opiates, benzos, cocaine, alcohol abuse    Sexual activity: Yes     Partners: Female     Comment: Lives alone, not working.    Other Topics Concern    Not on file   Social History Narrative    ** Merged History Encounter **          Social Determinants of Health     Financial Resource Strain: Medium Risk    Difficulty of Paying Living Expenses: Somewhat hard   Food Insecurity: No Food Insecurity    Worried About Running Out of Food in the Last Year: Never true    Ran Out of Food in the Last Year: Never true   Transportation Needs: No Transportation Needs    Lack of Transportation (Medical): No    Lack of Transportation (Non-Medical): No   Physical Activity: Inactive    Days of Exercise per Week: 0 days    Minutes of Exercise per Session: 0 min   Stress: Stress Concern Present    Feeling of Stress : Rather much   Social Connections: Socially Isolated    Frequency of Communication with Friends and Family: Never    Frequency of Social Gatherings with Friends and Family: Never    Attends Yazidi Services: Never    Active Member of Clubs or Organizations: No    Attends Club or Organization Meetings: Never    Marital Status: Never    Intimate Partner Violence: Not At Risk    Fear of Current or Ex-Partner: No    Emotionally Abused: No    Physically Abused: No    Sexually Abused: No   Housing Stability: Low Risk     Unable to Pay for Housing in the Last Year: No    Number of [place] in the Last Year: 1    Unstable Housing in the Last Year: No       Family History   Problem Relation Age of Onset    Diabetes Father     Alcohol Abuse Father     Depression Father     Arthritis Father     High Blood Pressure Father     Other Father         aneurysm & epilepsy    Migraines Father     Arthritis Mother     Other Mother         aneurysm & epilepsy    Migraines Mother     Diabetes Brother         Aunt and uncles    Depression Brother     Mental Illness Brother     Other Brother         epilepsy    Migraines Brother     Stroke Other         Uncle    Other Brother         murdered Oct 6th, 2014    Colon Cancer Paternal Cousin 43    Other Sister         epilepsy    Migraines Sister        Allergies:  Dicyclomine, Famotidine, Geodon [ziprasidone hcl], Haloperidol, Iv dye [iodides], Reglan [metoclopramide], Ibuprofen, Aspirin, Dicyclomine hcl, Hydroxyzine hcl, Iodine, Metronidazole, Mirtazapine, Promethazine, and Ziprasidone    Home Medications:  Prior to Admission medications    Medication Sig Start Date End Date Taking?  Authorizing Provider   diphenhydrAMINE (BENADRYL) 25 MG capsule Take 1 capsule by mouth every 6 hours as needed for Itching 8/18/22 8/28/22  Nydia Duncan DO   cloNIDine (CATAPRES) 0.1 MG tablet Take 1 tablet by mouth nightly for 3 days 7/14/22 7/17/22  MASSIEL Lomas CNP   tiZANidine (ZANAFLEX) 4 MG tablet Take 1 tablet by mouth 2 times daily as needed (muscle spasm) 7/14/22   MASSIEL Lomas CNP   Zinc Oxide 6 % CREA Apply 1 applicator topically in the morning and at bedtime 7/3/22   CHER Robles MD   ARIPiprazole (ABILIFY) 15 MG tablet Take 1 tablet by mouth daily 6/22/22   Nalini Barron MD   cetirizine (ZYRTEC) 10 MG tablet Take 1 tablet by mouth daily 6/22/22   Nalini Barron MD   meloxicam (MOBIC) 7.5 MG tablet Take 1 tablet by mouth 2 times daily as needed for Pain 8/19/21 10/30/21  MASSIEL Grover CNP       REVIEW OF SYSTEMS    (2-9 systems for level 4, 10 or more for level 5)      Review of Systems   Constitutional:  Negative for chills and fever. HENT:  Negative for sore throat. Eyes:  Negative for visual disturbance. Respiratory:  Negative for cough and shortness of breath. Cardiovascular:  Positive for chest pain. Negative for palpitations. Gastrointestinal:  Negative for abdominal pain and vomiting. Endocrine: Negative for polyuria. Genitourinary:  Negative for dysuria and hematuria. Musculoskeletal:  Negative for back pain. Skin:  Negative for rash. Neurological:  Negative for light-headedness and headaches. Psychiatric/Behavioral:  Negative for confusion. PHYSICAL EXAM   (up to 7 for level 4, 8 or more for level 5)      INITIAL VITALS:   /61   Pulse 74   Temp 97.9 °F (36.6 °C) (Oral)   Resp 18   Ht 5' 6\" (1.676 m)   Wt 205 lb (93 kg)   SpO2 94%   BMI 33.09 kg/m²     Physical Exam  Constitutional:       Appearance: Normal appearance. HENT:      Head: Normocephalic. Nose: Nose normal.      Mouth/Throat:      Mouth: Mucous membranes are moist.      Pharynx: Oropharynx is clear.    Eyes:      Extraocular Movements: Extraocular movements intact. Pupils: Pupils are equal, round, and reactive to light. Cardiovascular:      Rate and Rhythm: Normal rate and regular rhythm. Pulses: Normal pulses. Heart sounds: Normal heart sounds. Pulmonary:      Effort: Pulmonary effort is normal.      Breath sounds: Normal breath sounds. Abdominal:      Palpations: Abdomen is soft. Tenderness: no abdominal tenderness There is no right CVA tenderness or left CVA tenderness. Musculoskeletal:      Cervical back: No tenderness. Right lower leg: No edema. Left lower leg: No edema. Skin:     General: Skin is warm. Capillary Refill: Capillary refill takes less than 2 seconds. Neurological:      Mental Status: He is alert and oriented to person, place, and time.       Comments: GCS 14, eyes opening to voice only  Moving all 4 extremities spontaneously, antigravity, left upper extremity slightly weaker than right, however without drift  Patient reporting left-sided facial and left upper extremity sensation deficits  NIH 1 for mild left sided facial droop       DIFFERENTIAL  DIAGNOSIS     PLAN (LABS / IMAGING / EKG):  Orders Placed This Encounter   Procedures    COVID-19, Rapid    CT HEAD WO CONTRAST    CTA HEAD NECK W CONTRAST    XR CHEST (2 VW)    STROKE PANEL    TOX SCR, BLD, ED    Protime-INR    APTT    SPECIMEN REJECTION    CBC    PREVIOUS SPECIMEN    DRUG SCREEN MULTI URINE    Inpatient consult to Stroke Team    Inpatient consult to IV Team    EKG 12 Lead       MEDICATIONS ORDERED:  Orders Placed This Encounter   Medications    ondansetron (ZOFRAN) injection 4 mg    methylPREDNISolone sodium (SOLU-MEDROL) injection 40 mg    diphenhydrAMINE (BENADRYL) injection 50 mg    methylPREDNISolone sodium (SOLU-MEDROL) injection 40 mg    DISCONTD: diphenhydrAMINE (BENADRYL) injection 12.5 mg       DDX: Ischemic stroke, hemorrhagic stroke, Bell's palsy, seizure-like activity, hypoglycemia, ACS    DIAGNOSTIC RESULTS / EMERGENCY DEPARTMENT COURSE / MDM   LAB RESULTS:  Results for orders placed or performed during the hospital encounter of 08/22/22   COVID-19, Rapid    Specimen: Nasopharyngeal Swab   Result Value Ref Range    Specimen Description . NASOPHARYNGEAL SWAB     SARS-CoV-2, Rapid Not Detected Not Detected   STROKE PANEL   Result Value Ref Range    Glucose 102 (H) 70 - 99 mg/dL    BUN 17 6 - 20 mg/dL    Creatinine 0.71 0.70 - 1.20 mg/dL    Calcium 8.9 8.6 - 10.4 mg/dL    Sodium 141 135 - 144 mmol/L    Potassium 4.5 3.7 - 5.3 mmol/L    Chloride 103 98 - 107 mmol/L    CO2 26 20 - 31 mmol/L    Anion Gap 12 9 - 17 mmol/L    GFR Non-African American >60 >60 mL/min    GFR African American >60 >60 mL/min    GFR Comment          WBC  k/uL     Unable to perform testing: Specimen clotted. FLORY VILLAREAL    RBC  m/uL     Unable to perform testing: Specimen clotted. FLORY VILLAREAL    Hemoglobin  g/dL     Unable to perform testing: Specimen clotted. FLORY VILLAREAL    Hematocrit  %     Unable to perform testing: Specimen clotted. FLORY VILLAREAL    MCV  fL     Unable to perform testing: Specimen clotted. FLORY VILLAREAL    MCH  pg     Unable to perform testing: Specimen clotted. FLORY VILLAREAL    MCHC  g/dL     Unable to perform testing: Specimen clotted. FLORY VILLAREAL    RDW  %     Unable to perform testing: Specimen clotted. FLORY VILLAREAL    Platelets  k/uL     Unable to perform testing: Specimen clotted. FLORY VILLAREAL    MPV  fL     Unable to perform testing: Specimen clotted. FLORY VILLAREAL    NRBC Automated  per 100 WBC     Unable to perform testing: Specimen clotted.  FLORY VILLAREAL    Total  39 - 308 U/L    Differential Type PENDING     Seg Neutrophils PENDING %    Lymphocytes PENDING %    Monocytes PENDING %    Eosinophils % PENDING %    Basophils PENDING %    Immature Granulocytes PENDING 0 %    Segs Absolute PENDING k/uL    Absolute Lymph # PENDING k/uL    Absolute Mono # PENDING k/uL    Absolute Eos # PENDING k/uL    Basophils Absolute PENDING 0.0 - 0.2 k/uL    Absolute Immature Granulocyte PENDING 0.00 - 0.30 k/uL    WBC Morphology PENDING     RBC Morphology PENDING     Platelet Estimate PENDING     Myoglobin 22 (L) 28 - 72 ng/mL    Protime NS CALLED ED sec    INR NS CALLED ED     PTT NS CALLED ED sec    Troponin, High Sensitivity <6 0 - 22 ng/L   TOX SCR, BLD, ED   Result Value Ref Range    Acetaminophen Level <5 (L) 10 - 30 ug/mL    Ethanol <10 <10 mg/dL    Ethanol percent <3.366 <1.418 %    Salicylate Lvl <1 (L) 3 - 10 mg/dL    Toxic Tricyclic Sc,Blood PENDING NEGATIVE   SPECIMEN REJECTION   Result Value Ref Range    Specimen Source . BLOOD     Ordered Test CBC     Reason for Rejection Unable to perform testing: Specimen clotted. CBC   Result Value Ref Range    WBC 7.9 3.5 - 11.3 k/uL    RBC 3.68 (L) 4.21 - 5.77 m/uL    Hemoglobin 11.2 (L) 13.0 - 17.0 g/dL    Hematocrit 33.5 (L) 40.7 - 50.3 %    MCV 91.0 82.6 - 102.9 fL    MCH 30.4 25.2 - 33.5 pg    MCHC 33.4 28.4 - 34.8 g/dL    RDW 12.9 11.8 - 14.4 %    Platelets 567 949 - 340 k/uL    MPV 9.9 8.1 - 13.5 fL    NRBC Automated 0.0 0.0 per 100 WBC   EKG 12 Lead   Result Value Ref Range    Ventricular Rate 86 BPM    Atrial Rate 86 BPM    P-R Interval 138 ms    QRS Duration 84 ms    Q-T Interval 362 ms    QTc Calculation (Bazett) 433 ms    P Axis 63 degrees    R Axis 48 degrees    T Axis 42 degrees       IMPRESSION: 51-year-old gentleman presents to the emergency department with left-sided facial paresthesias and left upper extremity paresthesias and weakness. Patient states that this is been ongoing for 2 days. Patient also complaining of shaking episodes in his sleep. Vital signs stable, afebrile. Physical exam does show a slight left-sided facial droop with paresthesias of the left face and slight left upper extremity weakness and paresthesias. NIH of 1 for facial droop. Stroke consult called. Neurology team at bedside evaluating patient, states that this is likely a Bell's palsy.   Day shift neurology at bedside to evaluate patient as well, states that all [facility]ILY [facility] AT [address] [address] [phone]  Schedule an appointment as soon as possible for a visit   For follow up    [facility] ED  [address] [facility] [address] [phone]  Go to   As needed    DISCHARGE MEDICATIONS:  New Prescriptions    No medications on file       Edy Banerjee MD  Emergency Medicine Resident    (Please note that portions of thisnote were completed with a voice recognition program.  Efforts were made to edit the dictations but occasionally words are mis-transcribed.)       Edy Banerjee MD  Resident  08/22/22 7539

## 2022-08-22 NOTE — ED NOTES
Neuro at bedside  Facial droop reported resolved at  This time  DR Anjum Alston at bedside     [facility]  08/22/22 9542

## 2022-08-23 LAB
EKG ATRIAL RATE: 91 BPM
EKG P AXIS: 50 DEGREES
EKG P-R INTERVAL: 138 MS
EKG Q-T INTERVAL: 360 MS
EKG QRS DURATION: 90 MS
EKG QTC CALCULATION (BAZETT): 442 MS
EKG R AXIS: 50 DEGREES
EKG T AXIS: 45 DEGREES
EKG VENTRICULAR RATE: 91 BPM
REASON FOR REJECTION: NORMAL
ZZ NTE CLEAN UP: ORDERED TEST: NORMAL
ZZ NTE WITH NAME CLEAN UP: SPECIMEN SOURCE: NORMAL

## 2022-08-26 ENCOUNTER — HOSPITAL ENCOUNTER (EMERGENCY)
Age: 32
Discharge: HOME | End: 2022-08-26
Attending: STUDENT IN AN ORGANIZED HEALTH CARE EDUCATION/TRAINING PROGRAM
Payer: MEDICARE

## 2022-08-26 VITALS
HEART RATE: 107 BPM | TEMPERATURE: 98.5 F | SYSTOLIC BLOOD PRESSURE: 147 MMHG | BODY MASS INDEX: 31.25 KG/M2 | RESPIRATION RATE: 20 BRPM | WEIGHT: 211 LBS | HEIGHT: 69 IN | DIASTOLIC BLOOD PRESSURE: 111 MMHG | OXYGEN SATURATION: 97 %

## 2022-08-26 DIAGNOSIS — F41.1 ANXIETY STATE: Primary | ICD-10-CM

## 2022-08-26 DIAGNOSIS — K02.9 DENTAL CARIES: ICD-10-CM

## 2022-08-26 PROCEDURE — 99283 EMERGENCY DEPT VISIT LOW MDM: CPT

## 2022-08-26 PROCEDURE — 6370000000 HC RX 637 (ALT 250 FOR IP): Performed by: STUDENT IN AN ORGANIZED HEALTH CARE EDUCATION/TRAINING PROGRAM

## 2022-08-26 RX ORDER — DIPHENHYDRAMINE HCL 25 MG
25 TABLET ORAL ONCE
Status: DISCONTINUED | OUTPATIENT
Start: 2022-08-26 | End: 2022-08-26 | Stop reason: HOSPADM

## 2022-08-26 RX ORDER — AMOXICILLIN 500 MG/1
500 CAPSULE ORAL 2 TIMES DAILY
Qty: 14 CAPSULE | Refills: 0 | Status: SHIPPED | OUTPATIENT
Start: 2022-08-26 | End: 2022-09-02

## 2022-08-26 RX ORDER — AMOXICILLIN 500 MG/1
500 CAPSULE ORAL ONCE
Status: COMPLETED | OUTPATIENT
Start: 2022-08-26 | End: 2022-08-26

## 2022-08-26 RX ADMIN — AMOXICILLIN 500 MG: 500 CAPSULE ORAL at 04:28

## 2022-08-26 NOTE — DISCHARGE INSTRUCTIONS
Follow-up with your psychiatrist.  Take antibiotics to completion.
Patient/Caregiver provided printed discharge information.

## 2022-08-26 NOTE — ED NOTES
Pt to ed with c/o possible rash and dental pain. Skin warm and dry. Skin color appropriate to race. Pt has hx of dental issues. Respirations non labored. Pt A&Ox4.       Joelle Denver, RN  08/26/22 0578

## 2022-08-27 ENCOUNTER — HOSPITAL ENCOUNTER (EMERGENCY)
Age: 32
Discharge: HOME | End: 2022-08-27
Attending: EMERGENCY MEDICINE

## 2022-08-27 ENCOUNTER — HOSPITAL ENCOUNTER (EMERGENCY)
Age: 32
Discharge: HOME | End: 2022-08-27
Attending: EMERGENCY MEDICINE
Payer: MEDICARE

## 2022-08-27 VITALS
RESPIRATION RATE: 16 BRPM | OXYGEN SATURATION: 93 % | HEIGHT: 67 IN | SYSTOLIC BLOOD PRESSURE: 138 MMHG | DIASTOLIC BLOOD PRESSURE: 89 MMHG | WEIGHT: 210 LBS | HEART RATE: 105 BPM | TEMPERATURE: 97.9 F | BODY MASS INDEX: 32.96 KG/M2

## 2022-08-27 VITALS
OXYGEN SATURATION: 94 % | BODY MASS INDEX: 29.62 KG/M2 | WEIGHT: 200 LBS | HEART RATE: 112 BPM | HEIGHT: 69 IN | SYSTOLIC BLOOD PRESSURE: 155 MMHG | TEMPERATURE: 97.8 F | RESPIRATION RATE: 20 BRPM | DIASTOLIC BLOOD PRESSURE: 97 MMHG

## 2022-08-27 DIAGNOSIS — F41.9 ANXIETY: Primary | ICD-10-CM

## 2022-08-27 DIAGNOSIS — F41.1 ANXIETY STATE: Primary | ICD-10-CM

## 2022-08-27 PROCEDURE — 99282 EMERGENCY DEPT VISIT SF MDM: CPT

## 2022-08-27 ASSESSMENT — ENCOUNTER SYMPTOMS
COLOR CHANGE: 0
EYE REDNESS: 0
ABDOMINAL PAIN: 0
EYE DISCHARGE: 0
SHORTNESS OF BREATH: 0

## 2022-08-27 NOTE — ED PROVIDER NOTES
eMERGENCY dEPARTMENT eNCOUnter   Independent Attestation     Pt Name: Kami Soliman  MRN: [id number]  Armswagnergfmohinder 1990  Date of evaluation: 8/27/22     Kami Soliman is a 32 y.o. male with CC: Dizziness        This visit was performed by both a physician and an APC. I performed all aspects of the MDM as documented.       The care is provided during an unprecedented national emergency due to the novel coronavirus, Zhang Jackson MD  Attending Emergency Physician           Maria Elena Rich MD  08/27/22 4926

## 2022-08-27 NOTE — ED NOTES
PT WANDERING IN RALPH BY NURSES STATION ASKED TO RETURN TO ROOM  PT NOT FOUND IN ROOM FOR DISCHARGE     Ivonne Ramos RN  08/27/22 4244

## 2022-08-27 NOTE — DISCHARGE INSTRUCTIONS
Take meds as prescribed. Follow outpatient tomorrow with doctor or by calling [phone] or [phone].    Return to ER immediately if symptoms worsen or persist.

## 2022-08-27 NOTE — ED PROVIDER NOTES
The patient left without being seen. I did not see this patient.      Lillian Davison MD  08/27/22 9926

## 2022-08-27 NOTE — ED NOTES
Pt registered and then sts I need to go find my phone and walked out     Chica Wilcox RN  08/27/22 3024

## 2022-08-27 NOTE — ED PROVIDER NOTES
[address] [place] [address] ED  [facility]      Pt Name: Arian Garcias  MRN: [id number]  Armswagnergfmohinder 1990  Date ofevaluation: 8/27/2022  Provider: Vale Merlin, [address] [place] [address]       Chief Complaint   Patient presents with    Dizziness         HISTORY OF PRESENT ILLNESS  (Location/Symptom, Timing/Onset, Context/Setting, Quality, Duration, Modifying Factors, Severity.)   Arian Garcias is a 32 y.o. male who presents to the emergency department with reported dizziness. Patient denies any fevers or chills. No cough. Reports anxiety. Patient recently had progression of Xanax. He is requesting more. Reports upcoming appointment with psychiatrist.  We will not provide any additional Xanax. Patient will need a follow-up. Patient does not appear any type of distress. Patient is well-known to ER frequent visits. Patient seen earlier today in the ER. Also signed and and signed out earlier today as well. Multiple ER visits recently. Nursing Notes were reviewed.     ALLERGIES     Dicyclomine, Famotidine, Geodon [ziprasidone hcl], Haloperidol, Iv dye [iodides], Reglan [metoclopramide], Ibuprofen, Aspirin, Dicyclomine hcl, Hydroxyzine hcl, Iodine, Metronidazole, Mirtazapine, Promethazine, and Ziprasidone    CURRENT MEDICATIONS       Discharge Medication List as of 8/27/2022 11:25 AM        CONTINUE these medications which have NOT CHANGED    Details   amoxicillin (AMOXIL) 500 MG capsule Take 1 capsule by mouth 2 times daily for 7 days, Disp-14 capsule, R-0Print      cloNIDine (CATAPRES) 0.1 MG tablet Take 1 tablet by mouth nightly for 3 days, Disp-3 tablet, R-0Print      tiZANidine (ZANAFLEX) 4 MG tablet Take 1 tablet by mouth 2 times daily as needed (muscle spasm), Disp-2 tablet, R-0Print      Zinc Oxide 6 % CREA Apply 1 applicator topically in the morning and at bedtime, Disp-114 g, R-0Print      ARIPiprazole (ABILIFY) 15 MG tablet Take 1 tablet by mouth daily, Disp-30 tablet, R-3Normal      cetirizine (ZYRTEC) 10 MG tablet Take 1 tablet by mouth daily, Disp-30 tablet, R-0Normal             PAST MEDICAL HISTORY         Diagnosis Date    Anxiety     Arthritis     Asthma     Bipolar I disorder, most recent episode (or current) depressed, unspecified 9/12/2014    Clostridium difficile infection     COPD (chronic obstructive pulmonary disease) (HCC)     Depression     Disease of blood and blood forming organ     Eczema     Fracture, metacarpal     R 4th and 5th    Gastric ulcer     Gastritis 06/13/2018    GERD (gastroesophageal reflux disease)     GI bleed     H. 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[address]     Substance abuse ([facility] [address]     Hx of opiates, benzos, cocaine, alcohol abuse    Suicidal ideation     Suicide attempt by hanging ([facility] [address]     Tobacco dependence     Ulcerative colitis ([address]     UTI (urinary tract infection)        SURGICAL HISTORY           Procedure Laterality Date    ABDOMEN SURGERY      upper GI scope 7/7/2015    BRONCHOSCOPY      CHOLECYSTECTOMY, LAPAROSCOPIC  07/14/2017    surgery performed at [facility], COLON, DIAGNOSTIC      OTHER SURGICAL HISTORY      lumbar puncture    SC COLONOSCOPY W/BIOPSY SINGLE/MULTIPLE  8/9/2017    COLONOSCOPY WITH BIOPSY performed by Zaria Espinosa MD at [facility]    SC EGD TRANSORAL BIOPSY SINGLE/MULTIPLE N/A 3/20/2017    EGD BIOPSY performed by William Vogt MD at [facility]    SC ESOPHAGOGASTRODUODENOSCOPY TRANSORAL DIAGNOSTIC N/A 8/9/2017    EGD ESOPHAGOGASTRODUODENOSCOPY performed by Zaria Espinosa MD at [facility] SIGMOIDOSCOPY N/A 3/20/2017    SIGMOIDOSCOPY DIAGNOSTIC FLEXIBLE performed by Sue Glass MD at [facility]    UPPER GASTROINTESTINAL ENDOSCOPY  2/4/16    UPPER GASTROINTESTINAL ENDOSCOPY N/A 6/13/2018    GASTRITIS    UPPER GASTROINTESTINAL ENDOSCOPY N/A 9/20/2018    EGD BIOPSY performed by Taisa West MD at [facility]           Problem Relation Age of Onset    Diabetes Father     Alcohol Abuse Father     Depression Father     Arthritis Father     High Blood Pressure Father     Other Father         aneurysm & epilepsy    Migraines Father     Arthritis Mother     Other Mother         aneurysm & epilepsy    Migraines Mother     Diabetes Brother         Aunt and uncles    Depression Brother     Mental Illness Brother     Other Brother         epilepsy    Migraines Brother     Stroke Other         Uncle    Other Brother         murdered Oct 6th, 2014    Colon Cancer Paternal Cousin 43    Other Sister         epilepsy    Migraines Sister      Family Status   Relation Name Status    Father  Alive    Mother  Alive    Brother  (Not Specified)    Other  (Not Specified)    Brother  (Not Specified)    PCousin  (Not Specified)    Sister  (Not Specified)        SOCIAL HISTORY      reports that he has been smoking cigarettes. He has a 7.50 pack-year smoking history. He has never used smokeless tobacco. He reports that he does not currently use alcohol. He reports that he does not currently use drugs after having used the following drugs: Opiates  and Cocaine. REVIEW OFSYSTEMS    (2-9 systems for level 4, 10 or more for level 5)   Review of Systems    Except as noted above the remainder of the review of systems was reviewed and negative.      PHYSICAL EXAM    (up to 7 for level 4, 8 or more for level 5)     ED Triage Vitals [08/27/22 1054]   BP Temp Temp Source Heart Rate Resp SpO2 Height Weight   138/89 97.9 °F (36.6 °C) Oral (!) 105 16 93 % 5' 7\" (1.702 m) 210 lb (95.3 kg)      Physical Exam  Constitutional:       Appearance: He is well-developed. HENT:      Head: Normocephalic and atraumatic. Cardiovascular:      Rate and Rhythm: Normal rate and regular rhythm. Pulmonary:      Effort: Pulmonary effort is normal.      Breath sounds: Normal breath sounds. Abdominal:      Palpations: Abdomen is soft. Musculoskeletal:         General: Normal range of motion. Cervical back: Normal range of motion and neck supple. Skin:     General: Skin is warm. Neurological:      Mental Status: He is alert and oriented to person, place, and time. Psychiatric:         Behavior: Behavior normal.               DIAGNOSTIC RESULTS     EKG: All EKG's are interpreted by the Emergency Department Physician who either signs or Co-signs this chart in the absence of a cardiologist.        RADIOLOGY:   Non-plain film images such as CT, Ultrasound and MRI are read by the radiologist. Plain radiographic images arevisualized and preliminarily interpreted by the emergency physician with the below findings:        Interpretation per the Radiologist below, if available at thetime of this note:          ED BEDSIDE ULTRASOUND:   Performed by ED Physician - none    LABS:  Labs Reviewed   POCT GLUCOSE       All other labs were within normal range or not returned as of this dictation. EMERGENCY DEPARTMENT COURSE and DIFFERENTIAL DIAGNOSIS/MDM:   Vitals:    Vitals:    08/27/22 1054   BP: 138/89   Pulse: (!) 105   Resp: 16   Temp: 97.9 °F (36.6 °C)   TempSrc: Oral   SpO2: 93%   Weight: 210 lb (95.3 kg)   Height: 5' 7\" (1.702 m)     Will DC home. No signs of distress. CONSULTS:  None    PROCEDURES:  Procedures        FINAL IMPRESSION      1. Anxiety state          DISPOSITION/PLAN   DISPOSITION Decision To Discharge 08/27/2022 11:25:22 AM      PATIENTREFERRED TO:   No follow-up provider specified.     DISCHARGE MEDICATIONS:     Discharge Medication List as of 8/27/2022 11:25 AM              (Please note that

## 2022-08-27 NOTE — ED PROVIDER NOTES
[address] [place] [address] ED  EMERGENCY DEPARTMENT ENCOUNTER      Pt Name: Sindhu Davis  MRN: [id number]  [place] 1990  Date of evaluation: 8/27/2022  Provider: Tyler Anglin MD    CHIEF COMPLAINT       Chief Complaint   Patient presents with    Medication Request         HISTORY OF PRESENT ILLNESS  (Location/Symptom, Timing/Onset, Context/Setting, Quality, Duration, Modifying Factors, Severity.)   Sindhu Davis is a 32 y.o. male who presents to the emergency department because he wants medication. This is at least his fourth visit to the emergency department between today and yesterday. He is noncompliant with his medications and wants to have his Xanax. He has no physical complaint such as fever cough or chest pain. He is a poor historian. Nursing Notes were reviewed.     ALLERGIES     Dicyclomine, Famotidine, Geodon [ziprasidone hcl], Haloperidol, Iv dye [iodides], Reglan [metoclopramide], Ibuprofen, Aspirin, Dicyclomine hcl, Hydroxyzine hcl, Iodine, Metronidazole, Mirtazapine, Promethazine, and Ziprasidone    CURRENT MEDICATIONS       Previous Medications    AMOXICILLIN (AMOXIL) 500 MG CAPSULE    Take 1 capsule by mouth 2 times daily for 7 days    ARIPIPRAZOLE (ABILIFY) 15 MG TABLET    Take 1 tablet by mouth daily    CETIRIZINE (ZYRTEC) 10 MG TABLET    Take 1 tablet by mouth daily    CLONIDINE (CATAPRES) 0.1 MG TABLET    Take 1 tablet by mouth nightly for 3 days    TIZANIDINE (ZANAFLEX) 4 MG TABLET    Take 1 tablet by mouth 2 times daily as needed (muscle spasm)    ZINC OXIDE 6 % CREA    Apply 1 applicator topically in the morning and at bedtime       PAST MEDICAL HISTORY         Diagnosis Date    Anxiety     Arthritis     Asthma     Bipolar I disorder, most recent episode (or current) depressed, unspecified 9/12/2014    Clostridium difficile infection     COPD (chronic obstructive pulmonary disease) ([facility])     Depression     Disease of blood and blood forming organ     Eczema     Fracture, metacarpal R 4th and 5th    Gastric ulcer     Gastritis 06/13/2018    GERD (gastroesophageal reflux disease)     GI bleed     H. 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[facility]     Substance abuse ([facility]     Hx of opiates, benzos, cocaine, alcohol abuse    Suicidal ideation     Suicide attempt by hanging ([facility]     Tobacco dependence     Ulcerative colitis ([facility]     UTI (urinary tract infection)        SURGICAL HISTORY           Procedure Laterality Date    ABDOMEN SURGERY      upper GI scope 7/7/2015    BRONCHOSCOPY      CHOLECYSTECTOMY, LAPAROSCOPIC  07/14/2017    surgery performed at [facility], COLON, DIAGNOSTIC      OTHER SURGICAL HISTORY      lumbar puncture    PA COLONOSCOPY W/BIOPSY SINGLE/MULTIPLE  8/9/2017    COLONOSCOPY WITH BIOPSY performed by Ginger Nolen MD at [facility]    PA EGD TRANSORAL BIOPSY SINGLE/MULTIPLE N/A 3/20/2017    EGD BIOPSY performed by Roselia Mckoy MD at [facility]    PA ESOPHAGOGASTRODUODENOSCOPY TRANSORAL DIAGNOSTIC N/A 8/9/2017    EGD ESOPHAGOGASTRODUODENOSCOPY performed by Ginger Nolen MD at [facility] N/A 3/20/2017    SIGMOIDOSCOPY DIAGNOSTIC FLEXIBLE performed by Roselia Mckoy MD at [facility]  2/4/16    UPPER GASTROINTESTINAL ENDOSCOPY N/A 6/13/2018    GASTRITIS    UPPER GASTROINTESTINAL ENDOSCOPY N/A 9/20/2018    EGD BIOPSY performed by Brayden Kolb MD at [facility]           Problem Relation Age of Onset    Diabetes Father     Alcohol Abuse Father     Depression Father     Arthritis Father     High Blood Pressure Father     Other Father         aneurysm & epilepsy    Migraines Father     Arthritis Mother     Other Mother         aneurysm & epilepsy    Migraines Mother     Diabetes Brother         Aunt and uncles    Depression Brother     Mental Illness Brother     Other Brother         epilepsy    Migraines Brother     Stroke Other         Uncle    Other Brother         murdered Oct 6th, 2014    Colon Cancer Paternal Cousin 43    Other Sister         epilepsy    Migraines Sister      Family Status   Relation Name Status    Father  Alive    Mother  Alive    Brother  (Not Specified)    Other  (Not Specified)    Brother  (Not Specified)    PCousin  (Not Specified)    Sister  (Not Specified)        SOCIAL HISTORY      reports that he has been smoking cigarettes. He has a 7.50 pack-year smoking history. He has never used smokeless tobacco. He reports that he does not currently use alcohol. He reports that he does not currently use drugs after having used the following drugs: Opiates  and Cocaine. REVIEW OF SYSTEMS    (2-9 systems for level 4, 10 or more for level 5)     Review of Systems   Unable to perform ROS: Psychiatric disorder      Except as noted above the remainder of the review of systems was reviewed and negative. PHYSICAL EXAM    (up to 7 for level 4, 8 or more for level 5)     Vitals:    08/27/22 0807   BP: (!) 155/97   Pulse: (!) 112   Resp: 20   Temp: 97.8 °F (36.6 °C)   TempSrc: Oral   SpO2: 94%   Weight: 200 lb (90.7 kg)   Height: 5' 9\" (1.753 m)       Physical Exam  Vitals reviewed. Constitutional:       General: He is not in acute distress. Appearance: He is well-developed. He is not diaphoretic. Comments: He is standing in the room. HENT:      Head: Normocephalic and atraumatic. Eyes:      General:         Right eye: No discharge. Left eye: No discharge. Cardiovascular:      Rate and Rhythm: Regular rhythm.    Pulmonary:      Effort: Pulmonary effort is normal. No respiratory distress. Breath sounds: No stridor. No rales. Abdominal:      General: There is no distension. Musculoskeletal:         General: Normal range of motion. Cervical back: Neck supple. Skin:     General: Skin is warm and dry. Findings: No erythema or rash. Neurological:      Mental Status: He is alert. Comments: Awake and alert   Psychiatric:      Comments: He is talking about how he was sent here to save the universe. DIAGNOSTIC RESULTS     EKG: All EKG's are interpreted by the Emergency Department Physician who either signs or Co-signs this chart in the absence of a cardiologist.    Not indicated    RADIOLOGY:   Non-plain film images such as CT, Ultrasound and MRI are read by the radiologist. Plain radiographic images are visualized and preliminarily interpreted by the emergency physician with the below findings:    Not indicated    Interpretation per the Radiologist below, if available at the time of this note:        LABS:  Labs Reviewed - No data to display    All other labs were within normal range or not returned as of this dictation. EMERGENCY DEPARTMENT COURSE and DIFFERENTIAL DIAGNOSIS/MDM:   Vitals:    Vitals:    08/27/22 0807   BP: (!) 155/97   Pulse: (!) 112   Resp: 20   Temp: 97.8 °F (36.6 °C)   TempSrc: Oral   SpO2: 94%   Weight: 200 lb (90.7 kg)   Height: 5' 9\" (1.753 m)       No orders of the defined types were placed in this encounter. Medical Decision Making: He was not provided medications. He received a Benadryl injection a few hours ago at another facility. He was referred again to rescue crisis. CONSULTS:  None    PROCEDURES:  None    FINAL IMPRESSION      1.  Anxiety          DISPOSITION/PLAN   DISPOSITION Decision To Discharge 08/27/2022 08:11:13 AM      PATIENT REFERRED TO:   [facility] ED  [address] [facility]  [phone]    If symptoms worsen    Rescue crisis            DISCHARGE MEDICATIONS:     New Prescriptions    No medications on file       The care is provided during an unprecedented national emergency due to the novel coronavirus, COVID-19.     (Please note that portions of this note were completed with a voice recognition program.  Efforts were made to edit the dictations but occasionally words are mis-transcribed.)    Carola Go MD  Attending Emergency Physician            Carola Go MD  08/27/22 7164

## 2022-08-28 NOTE — ED PROVIDER NOTES
[facility]  Emergency Department Encounter     Pt Name: Charlene Snow  MRN: [id number]  [place] 1990  Date of evaluation: 8/27/22  PCP:  No primary care provider on file. CHIEF COMPLAINT       Chief Complaint   Patient presents with    Anxiety    Rash    Dental Pain       HISTORY OFPRESENT ILLNESS  (Location/Symptom, Timing/Onset, Context/Setting, Quality, Duration, Modifying Factors,Severity.)      Charlene Snow is a 32 y.o. male who presents with complaint of itchy rash, anxiety. Patient is well-known to the department. Has multiple ER visits this past month. States he would like a refill of his alprazolam.  He is feeling quite anxious. Second complaint of generalized itchy rash has been ongoing for months. Does have significant psychiatric history for which he has been recommended to avoid any benzodiazepine medicine as well as antihistamines as psychiatrist believes this worsens his psychiatric disease. PAST MEDICAL / SURGICAL / SOCIAL / FAMILY HISTORY      has a past medical history of Anxiety, Arthritis, Asthma, Bipolar I disorder, most recent episode (or current) depressed, unspecified, Clostridium difficile infection, COPD (chronic obstructive pulmonary disease) ([address] Depression, Disease of blood and blood forming organ, Eczema, Fracture, metacarpal, Gastric ulcer, Gastritis, GERD (gastroesophageal reflux disease), GI bleed, H. pylori infection, H/O blood clots, Head injury, Headache, Insomnia, Juvenile rheumatoid arthritis ([address] Neuromuscular disorder ([address] PFAPA syndrome ([address] PUD (peptic ulcer disease), Rheumatoid arthritis ([address] Rheumatoid arthritis(714.0), Severe recurrent major depression without psychotic features ([address] Sleep apnea, 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[address] Substance abuse ([address] Suicidal ideation, Suicide attempt by hanging ([address] Tobacco dependence, Ulcerative colitis ([address] and UTI (urinary tract infection).      has a past surgical history that includes Colonoscopy; bronchoscopy; other surgical history; Upper gastrointestinal endoscopy (2/4/16); pr egd transoral biopsy single/multiple (N/A, 3/20/2017); sigmoidoscopy (N/A, 3/20/2017); Cholecystectomy, laparoscopic (07/14/2017); pr esophagogastroduodenoscopy transoral diagnostic (N/A, 8/9/2017); pr colonoscopy w/biopsy single/multiple (8/9/2017); Abdomen surgery; Upper gastrointestinal endoscopy (N/A, 6/13/2018); Upper gastrointestinal endoscopy (N/A, 9/20/2018); and Endoscopy, colon, diagnostic. Social History     Socioeconomic History    Marital status: Single     Spouse name: Not on file    Number of children: Not on file    Years of education: Not on file    Highest education level: Not on file   Occupational History    Occupation: disability   Tobacco Use    Smoking status: Every Day     Packs/day: 0.50     Years: 15.00     Pack years: 7.50     Types: Cigarettes    Smokeless tobacco: Never   Vaping Use    Vaping Use: Former   Substance and Sexual Activity    Alcohol use: Not Currently     Comment: drinks daily    Drug use: Not Currently     Types: Opiates , Cocaine     Comment: Hx of opiates, benzos, cocaine, alcohol abuse    Sexual activity: Yes     Partners: Female     Comment: Lives alone, not working. Other Topics Concern    Not on file   Social History Narrative    ** Merged History Encounter **          Social Determinants of Health     Financial Resource Strain: Medium Risk    Difficulty of Paying Living Expenses: Somewhat hard   Food Insecurity: No Food Insecurity    Worried About Running Out of Food in the Last Year: Never true    M[place] of Food in the Last Year: Never true   Transportation Needs: No Transportation Needs    Lack of Transportation (Medical): No    Lack of Transportation (Non-Medical):  No   Physical Activity: Inactive    Days of Exercise per Week: 0 days    Minutes of Exercise per Session: 0 min   Stress: Stress Concern Present    Feeling of Stress : Rather much   Social Connections: Socially Isolated    Frequency of Communication with Friends and Family: Never    Frequency of Social Gatherings with Friends and Family: Never    Attends Baptism Services: Never    Active Member of Clubs or Organizations: No    Attends Club or Organization Meetings: Never    Marital Status: Never    Intimate Partner Violence: Not At Risk    Fear of Current or Ex-Partner: No    Emotionally Abused: No    Physically Abused: No    Sexually Abused: No   Housing Stability: [address] Unable to Pay for Housing in the Last Year: No    Number of [place] in the Last Year: 1    Unstable Housing in the Last Year: No       Family History   Problem Relation Age of Onset    Diabetes Father     Alcohol Abuse Father     Depression Father     Arthritis Father     High Blood Pressure Father     Other Father         aneurysm & epilepsy    Migraines Father     Arthritis Mother     Other Mother         aneurysm & epilepsy    Migraines Mother     Diabetes Brother         Aunt and uncles    Depression Brother     Mental Illness Brother     Other Brother         epilepsy    Migraines Brother     Stroke Other         Uncle    Other Brother         murdered Oct 6th, 2014    Colon Cancer Paternal Cousin 43    Other Sister         epilepsy   [facility] Migraines Sister        Allergies:  Dicyclomine, Famotidine, Geodon [ziprasidone hcl], Haloperidol, Iv dye [iodides], Reglan [metoclopramide], Ibuprofen, Aspirin, Dicyclomine hcl, Hydroxyzine hcl, Iodine, Metronidazole, Mirtazapine, Promethazine, and Ziprasidone    Home Medications:  Prior to Admission medications    Medication Sig Start Date End Date Taking?  Authorizing Provider   amoxicillin (AMOXIL) 500 MG capsule Take 1 capsule by mouth 2 times daily for 7 days 8/26/22 9/2/22 Yes Ryan Wells DO   cloNIDine (CATAPRES) 0.1 MG tablet Take 1 tablet by mouth nightly for 3 days 7/14/22 7/17/22  MASSIEL Trivedi CNP   tiZANidine (ZANAFLEX) 4 MG tablet Take 1 tablet by mouth 2 times daily as needed (muscle spasm) 7/14/22   Juliana March, APRN - CNP   Zinc Oxide 6 % CREA Apply 1 applicator topically in the morning and at bedtime 7/3/22   CHER Espinosa MD   ARIPiprazole (ABILIFY) 15 MG tablet Take 1 tablet by mouth daily 6/22/22   Dejah Reid MD   cetirizine (ZYRTEC) 10 MG tablet Take 1 tablet by mouth daily 6/22/22   Dejah Reid MD   meloxicam (MOBIC) 7.5 MG tablet Take 1 tablet by mouth 2 times daily as needed for Pain 8/19/21 10/30/21  MASSIEL Fagan - CNP       REVIEW OF SYSTEMS    (2-9 systems for level 4, 10 or more for level 5)      Review of Systems   Constitutional:  Negative for chills and fever. HENT:  Positive for dental problem. Eyes:  Negative for discharge and redness. Respiratory:  Negative for shortness of breath. Cardiovascular:  Negative for chest pain. Gastrointestinal:  Negative for abdominal pain. Genitourinary:  Negative for dysuria. Musculoskeletal:  Negative for arthralgias. Skin:  Positive for rash. Negative for color change. Neurological:  Negative for headaches. Psychiatric/Behavioral:  Negative for agitation and confusion. The patient is nervous/anxious. PHYSICAL EXAM   (up to 7 for level 4, 8 or more for level 5)     INITIAL VITALS:    height is 5' 9\" (1.753 m) and weight is 211 lb (95.7 kg). His temperature is 98.5 °F (36.9 °C). His blood pressure is 147/111 (abnormal) and his pulse is 107 (abnormal). His respiration is 20 and oxygen saturation is 97%. Physical Exam  Vitals and nursing note reviewed. Constitutional:       Appearance: He is well-developed. HENT:      Head: Normocephalic and atraumatic. Nose: Nose normal.      Mouth/Throat:      Mouth: Mucous membranes are moist.   Eyes:      General: No scleral icterus. Conjunctiva/sclera: Conjunctivae normal.      Pupils: Pupils are equal, round, and reactive to light.    Cardiovascular: Rate and Rhythm: Regular rhythm. Tachycardia present. Pulmonary:      Effort: Pulmonary effort is normal.      Breath sounds: Normal breath sounds. Musculoskeletal:         General: Normal range of motion. Comments: Scattered fine maculopapular rash to chest and back   Skin:     General: Skin is warm and dry. Findings: No erythema or rash. Neurological:      Mental Status: He is alert and oriented to person, place, and time. Psychiatric:         Behavior: Behavior normal.      Comments: Linear thought process, does not appear to be responding to internal stimuli, good eye contact       DIFFERENTIAL  DIAGNOSIS     PLAN (LABS / IMAGING / EKG):  No orders of the defined types were placed in this encounter. MEDICATIONS ORDERED:  Orders Placed This Encounter   Medications    DISCONTD: diphenhydrAMINE (BENADRYL) tablet 25 mg    amoxicillin (AMOXIL) capsule 500 mg     Order Specific Question:   Antimicrobial Indications     Answer:   Head and Neck Infection    amoxicillin (AMOXIL) 500 MG capsule     Sig: Take 1 capsule by mouth 2 times daily for 7 days     Dispense:  14 capsule     Refill:  0       DDX: Drug-seeking behavior versus dental caries versus allergic dermatitis    Initial MDM/Plan: 32 y.o. male who presents with multiple complaints. Patient does have poor dentition. Will place on amoxicillin more so for this. Patient continues to request benzodiazepines. He has no prescriptions for this. Psychiatry is recommended against this previously. He then bargains for IV Benadryl. We will give single p.o. Benadryl. Continuing to bargain but is excepting of this treatment. He is overall nontoxic well-appearing. From a psychiatric standpoint with linear thought process and appears much improved from previous visits. DIAGNOSTIC RESULTS / EMERGENCY DEPARTMENT COURSE / MDM     LABS:  Labs Reviewed - No data to display      RADIOLOGY:  No results found.     [address] COURSE:         Based on the low acuity of concerning symptoms and improvement of symptoms, patient will be discharged with follow up and prescription information listed in the Disposition section. Patient states they will follow-up with primary care physician and/or return to the emergency department should they experience a change or worsening of symptoms. Patient will be discharged with resources: summary of visit, instructions, follow-up information, prescriptions if necessary. Patient/ family instructed to read discharge paperwork. All of their questions and concerns were addressed. Suspicion for any acute life-threatening processes is low. Patient voices understanding of plan. PROCEDURES:  None    CONSULTS:  None    CRITICAL CARE:  0    FINAL IMPRESSION      1. Anxiety state    2. Dental caries          DISPOSITION / PLAN     DISPOSITION Decision To Discharge 08/26/2022 04:01:56 AM    Discharge    PATIENTREFERRED TO:  No follow-up provider specified.     DISCHARGE MEDICATIONS:  Discharge Medication List as of 8/26/2022  4:04 AM        START taking these medications    Details   amoxicillin (AMOXIL) 500 MG capsule Take 1 capsule by mouth 2 times daily for 7 days, Disp-14 capsule, R-0Print             Pancho Mtz DO  EmergencyMedicine Attending    (Please note that portions of this note were completed with a voice recognition program.  Efforts were made to edit the dictations but occasionally words are mis-transcribed.)       Pancho Mtz DO  08/28/22 0149

## 2022-08-29 ENCOUNTER — HOSPITAL ENCOUNTER (EMERGENCY)
Age: 32
Discharge: HOME | End: 2022-08-29
Attending: EMERGENCY MEDICINE
Payer: MEDICARE

## 2022-08-29 VITALS
RESPIRATION RATE: 18 BRPM | WEIGHT: 210 LBS | HEIGHT: 67 IN | OXYGEN SATURATION: 100 % | HEART RATE: 85 BPM | DIASTOLIC BLOOD PRESSURE: 90 MMHG | SYSTOLIC BLOOD PRESSURE: 139 MMHG | BODY MASS INDEX: 32.96 KG/M2 | TEMPERATURE: 97 F

## 2022-08-29 DIAGNOSIS — K08.89 PAIN, DENTAL: Primary | ICD-10-CM

## 2022-08-29 PROCEDURE — 99283 EMERGENCY DEPT VISIT LOW MDM: CPT

## 2022-08-29 PROCEDURE — 6370000000 HC RX 637 (ALT 250 FOR IP): Performed by: STUDENT IN AN ORGANIZED HEALTH CARE EDUCATION/TRAINING PROGRAM

## 2022-08-29 RX ORDER — DIPHENHYDRAMINE HCL 25 MG
25 TABLET ORAL ONCE
Status: COMPLETED | OUTPATIENT
Start: 2022-08-29 | End: 2022-08-29

## 2022-08-29 RX ORDER — ACETAMINOPHEN 500 MG
1000 TABLET ORAL ONCE
Status: COMPLETED | OUTPATIENT
Start: 2022-08-29 | End: 2022-08-29

## 2022-08-29 RX ADMIN — ACETAMINOPHEN 1000 MG: 500 TABLET ORAL at 03:13

## 2022-08-29 RX ADMIN — DIPHENHYDRAMINE HCL 25 MG: 25 TABLET ORAL at 03:13

## 2022-08-29 ASSESSMENT — ENCOUNTER SYMPTOMS
SHORTNESS OF BREATH: 0
SINUS PAIN: 0
NAUSEA: 0
CONSTIPATION: 0
ABDOMINAL DISTENTION: 0
ABDOMINAL PAIN: 0
SINUS PRESSURE: 0
COUGH: 0
SORE THROAT: 0
DIARRHEA: 0
EYE ITCHING: 0
EYE PAIN: 0

## 2022-08-29 ASSESSMENT — PAIN - FUNCTIONAL ASSESSMENT
PAIN_FUNCTIONAL_ASSESSMENT: 0-10
PAIN_FUNCTIONAL_ASSESSMENT: 0-10

## 2022-08-29 ASSESSMENT — PAIN SCALES - GENERAL
PAINLEVEL_OUTOF10: 10
PAINLEVEL_OUTOF10: 10

## 2022-08-29 ASSESSMENT — PAIN DESCRIPTION - LOCATION: LOCATION: JAW;TEETH

## 2022-08-29 NOTE — DISCHARGE INSTRUCTIONS
You are seen in the emergency department for dental pain and a suspected insect bite. You should continue taking your previously prescribed amoxicillin as directed for possible dental infection and follow-up immediately with a dental specialist. Please return to the ER for any new or worsening symptoms. For pain: You may take tylenol 1,000mg by mouth every 6 hours as needed for pain. Do not exceed 4,000mg per day. If you have liver disease don't take tylenol. You may alternate application of ice and heat 20 minutes at a time as desired.

## 2022-08-29 NOTE — ED PROVIDER NOTES
ulcer disease), Rheumatoid arthritis ([facility] [address] Rheumatoid arthritis(714.0), Severe recurrent major depression without psychotic features ([facility] [address] Sleep apnea, 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[facility] [address] Substance abuse ([facility] [address] Suicidal ideation, Suicide attempt by hanging ([facility] [address] Tobacco dependence, Ulcerative colitis ([facility] [address] and UTI (urinary tract infection). has a past surgical history that includes Colonoscopy; bronchoscopy; other surgical history; Upper gastrointestinal endoscopy (2/4/16); pr egd transoral biopsy single/multiple (N/A, 3/20/2017); sigmoidoscopy (N/A, 3/20/2017); Cholecystectomy, laparoscopic (07/14/2017); pr esophagogastroduodenoscopy transoral diagnostic (N/A, 8/9/2017); pr colonoscopy w/biopsy single/multiple (8/9/2017); Abdomen surgery; Upper gastrointestinal endoscopy (N/A, 6/13/2018); Upper gastrointestinal endoscopy (N/A, 9/20/2018); and Endoscopy, colon, diagnostic. Social History     Socioeconomic History    Marital status: Single     Spouse name: Not on file    Number of children: Not on file    Years of education: Not on file    Highest education level: Not on file   Occupational History    Occupation: disability   Tobacco Use    Smoking status: Every Day     Packs/day: 0.50     Years: 15.00     Pack years: 7.50     Types: Cigarettes    Smokeless tobacco: Never   Vaping Use    Vaping Use: Former   Substance and Sexual Activity    Alcohol use: Not Currently     Comment: drinks daily    Drug use: Not Currently     Types: Opiates , Cocaine     Comment: Hx of opiates, benzos, cocaine, alcohol abuse    Sexual activity: Yes     Partners: Female     Comment: Lives alone, not working.    Other Topics Concern    Not on file   Social History Narrative    ** Merged History Encounter **          Social Determinants of Health     Financial Resource Strain: Medium Risk    Difficulty of Paying Living Expenses: Somewhat hard   Food Insecurity: No Food Insecurity    Worried About Running Out of Food in the Last Year: Never true    Ran Out of Food in the Last Year: Never true   Transportation Needs: No Transportation Needs    Lack of Transportation (Medical): No    Lack of Transportation (Non-Medical):  No   Physical Activity: Inactive    Days of Exercise per Week: 0 days    Minutes of Exercise per Session: 0 min   Stress: Stress Concern Present    Feeling of Stress : Rather much   Social Connections: Socially Isolated    Frequency of Communication with Friends and Family: Never    Frequency of Social Gatherings with Friends and Family: Never    Attends Muslim Services: Never    Active Member of Clubs or Organizations: No    Attends Club or Organization Meetings: Never    Marital Status: Never    Intimate Partner Violence: Not At Risk    Fear of Current or Ex-Partner: No    Emotionally Abused: No    Physically Abused: No    Sexually Abused: No   Housing Stability: Low Risk     Unable to Pay for Housing in the Last Year: No    Number of [place] in the Last Year: 1    Unstable Housing in the Last Year: No       Family History   Problem Relation Age of Onset    Diabetes Father     Alcohol Abuse Father     Depression Father     Arthritis Father     High Blood Pressure Father     Other Father         aneurysm & epilepsy    Migraines Father     Arthritis Mother     Other Mother         aneurysm & epilepsy    Migraines Mother     Diabetes Brother         Aunt and uncles    Depression Brother     Mental Illness Brother     Other Brother         epilepsy    Migraines Brother     Stroke Other         Uncle    Other Brother         murdered Oct 6th, 2014    Colon Cancer Paternal Cousin 43    Other Sister         epilepsy    Migraines Sister        Allergies:  Dicyclomine, Famotidine, Geodon [ziprasidone hcl], Haloperidol, Iv dye [iodides], Reglan [metoclopramide], Ibuprofen, Aspirin, Dicyclomine hcl, Hydroxyzine hcl, Iodine, Metronidazole, Mirtazapine, Promethazine, and Ziprasidone    Home Medications:  Prior to Admission medications    Medication Sig Start Date End Date Taking? Authorizing Provider   amoxicillin (AMOXIL) 500 MG capsule Take 1 capsule by mouth 2 times daily for 7 days 8/26/22 9/2/22  Dyllan Brice DO   cloNIDine (CATAPRES) 0.1 MG tablet Take 1 tablet by mouth nightly for 3 days 7/14/22 7/17/22  MASSIEL Rosales - CNP   tiZANidine (ZANAFLEX) 4 MG tablet Take 1 tablet by mouth 2 times daily as needed (muscle spasm) 7/14/22   MASSIEL Rosales CNP   Zinc Oxide 6 % CREA Apply 1 applicator topically in the morning and at bedtime 7/3/22   CHER Sparks MD   ARIPiprazole (ABILIFY) 15 MG tablet Take 1 tablet by mouth daily 6/22/22   Olga Rodriguez MD   cetirizine (ZYRTEC) 10 MG tablet Take 1 tablet by mouth daily 6/22/22   Olga Rodriguez MD   meloxicam (MOBIC) 7.5 MG tablet Take 1 tablet by mouth 2 times daily as needed for Pain 8/19/21 10/30/21  MASSIEL Boo CNP       REVIEW OF SYSTEMS    (2-9 systems for level 4, 10 or more for level 5)      Review of Systems   Constitutional:  Negative for activity change, chills and fever. HENT:  Positive for dental problem. Negative for congestion, sinus pressure, sinus pain and sore throat. Eyes:  Negative for pain and itching. Respiratory:  Negative for cough and shortness of breath. Cardiovascular:  Negative for chest pain. Gastrointestinal:  Negative for abdominal distention, abdominal pain, constipation, diarrhea and nausea. Endocrine: Negative for polyuria. Genitourinary:  Negative for dysuria and frequency. Musculoskeletal:  Negative for arthralgias. Skin:  Negative for rash. Neurological:  Negative for light-headedness and headaches. PHYSICAL EXAM   (up to 7 for level 4, 8 or more for level 5)      INITIAL VITALS:   BP (!) 139/90   Pulse 85   Temp 97 °F (36.1 °C) (Oral)   Resp 18   Ht 5' 7\" (1.702 m)   Wt 210 lb (95.3 kg)   SpO2 100%   BMI 32.89 kg/m²     Physical Exam  Vitals reviewed.    Constitutional:       General: He is not in acute distress. HENT:      Head: Normocephalic and atraumatic. Ears:      Comments: Hearing grossly normal     Nose: Nose normal.      Mouth/Throat:      Mouth: Mucous membranes are moist.      Pharynx: Oropharynx is clear. Comments: Mucosa is pink and =moist  Gingiva and interdental papilla   no area of fluctuance upon palpation  no mobility of the alveolar ridge or affected tooth    Pharynx is w/o erythema, tonsillar swelling or exudates. Symmetric and uvula midline. Submental and submandibular spaces are soft, no trismus     Eyes:      General: No scleral icterus. Conjunctiva/sclera: Conjunctivae normal.      Pupils: Pupils are equal, round, and reactive to light. Cardiovascular:      Rate and Rhythm: Normal rate and regular rhythm. Pulses: Normal pulses. Pulmonary:      Effort: Pulmonary effort is normal. No respiratory distress. Breath sounds: Normal breath sounds. Abdominal:      General: There is no distension. Palpations: Abdomen is soft. Tenderness: There is no abdominal tenderness. There is no guarding. Musculoskeletal:      Cervical back: No muscular tenderness. Right lower leg: No edema. Left lower leg: No edema. Skin:     General: Skin is warm and dry. Capillary Refill: Capillary refill takes less than 2 seconds. Comments: No rashes noted, no bites or wounds noted   Neurological:      General: No focal deficit present. Mental Status: He is alert and oriented to person, place, and time. Mental status is at baseline. DIFFERENTIAL  DIAGNOSIS     PLAN (LABS / IMAGING / EKG):  No orders of the defined types were placed in this encounter.       MEDICATIONS ORDERED:  Orders Placed This Encounter   Medications    diphenhydrAMINE (BENADRYL) tablet 25 mg    acetaminophen (TYLENOL) tablet 1,000 mg         DIAGNOSTIC RESULTS / EMERGENCY DEPARTMENT COURSE / MDM   LAB RESULTS:  No results found for this visit on 08/29/22. IMPRESSION: Yony Farias is a 32 y.o. man well known to the emergency department complaining of dental pain. He was recently started on amoxicillin for this. There is no palpable area of fluctuance for I&D. No concern for complicating fascial space infection. No systemic signs. Doubt sepsis, Merlyn's angina or other complication. Will advise him to continue taking antibiotics as previously directed and follow-up with a dental specialist.  Insect bite is not appreciated on exam.    RADIOLOGY:  No orders to display         EKG  none    All EKG's are interpreted by the Emergency Department Physician who either signs or Co-signs this chart in the absence of a cardiologist.    EMERGENCY DEPARTMENT COURSE:  Patient seen and evaluated, VSS and nontoxic in appearance. Patient was advised to continue taking amoxicillin and follow-up with a dental specialist.  He agrees to follow-up with his PCP for hospital follow-up. He understands that he can return to the emergency department for any new or worsening symptoms. No notes of  Admission Criteria type on file. PROCEDURES:  none    CONSULTS:  None    CRITICAL CARE:  See attending note         FINAL IMPRESSION      1.  Pain, dental          DISPOSITION / PLAN     DISPOSITION Decision To Discharge 08/29/2022 03:07:37 AM      PATIENT REFERRED TO:  [facility]  [address] [place] [address]  [phone]    As needed, If symptoms worsen    DISCHARGE MEDICATIONS:  Discharge Medication List as of 8/29/2022  3:08 AM          Nathan Rand DO  Emergency Medicine Resident    (Please note that portions of thisnote were completed with a voice recognition program.  Efforts were made to edit the dictations but occasionally words are mis-transcribed.)      Nathan Rand DO  Resident  08/29/22 0800

## 2022-08-29 NOTE — ED PROVIDER NOTES
[facility]     Emergency Department     Faculty Attestation    I performed a history and physical examination of the patient and discussed management with the resident. I have reviewed and agree with the residents findings including all diagnostic interpretations, and treatment plans as written. Any areas of disagreement are noted on the chart. I was personally present for the key portions of any procedures. I have documented in the chart those procedures where I was not present during the key portions. I have reviewed the emergency nurses triage note. I agree with the chief complaint, past medical history, past surgical history, allergies, medications, social and family history as documented unless otherwise noted below. Documentation of the HPI, Physical Exam and Medical Decision Making performed by scribmargo is based on my personal performance of the HPI, PE and MDM. For Physician Assistant/ Nurse Practitioner cases/documentation I have personally evaluated this patient and have completed at least one if not all key elements of the E/M (history, physical exam, and MDM). Additional findings are as noted.     31 yo M c/o L upper tooth pain, no fever, no vomit,no injury,   C/o insect bite R ankle & diffuse \"itching\" no cp, no sob,   -no suicidal or homicidal ideation,  Pe vss gcs 15, #15 partial crack & loss of enamel, no dental abscess, no oral lesion, no tongue elevation, floor of mouth compressible, neck supple with full rom, minimal swelling around R ankle, no induration, no abscess, no deformity, r lower extremity nv intact,     Dental referral, antibiotic, I feel patient stable for outpatient treatment,  No acute distress, patient is talkative and ambulatory,    EKG Interpretation    Interpreted by me      CRITICAL CARE: There was a high probability of clinically significant/life threatening deterioration in this patient's condition which required my urgent intervention. Total critical care time was 0 minutes. This excludes any time for separately reportable procedures.        [place],   08/29/22 [address],   08/29/22 0828

## 2022-09-01 ENCOUNTER — HOSPITAL ENCOUNTER (EMERGENCY)
Age: 32
Discharge: HOME | End: 2022-09-01

## 2022-09-01 ENCOUNTER — HOSPITAL ENCOUNTER (EMERGENCY)
Age: 32
Discharge: LEFT BEFORE DISCHARGE | End: 2022-09-01
Attending: EMERGENCY MEDICINE
Payer: MEDICARE

## 2022-09-01 VITALS
TEMPERATURE: 98.9 F | DIASTOLIC BLOOD PRESSURE: 47 MMHG | BODY MASS INDEX: 32.96 KG/M2 | WEIGHT: 210 LBS | OXYGEN SATURATION: 98 % | SYSTOLIC BLOOD PRESSURE: 107 MMHG | RESPIRATION RATE: 16 BRPM | HEIGHT: 67 IN | HEART RATE: 83 BPM

## 2022-09-01 VITALS
OXYGEN SATURATION: 92 % | SYSTOLIC BLOOD PRESSURE: 134 MMHG | RESPIRATION RATE: 18 BRPM | HEART RATE: 71 BPM | TEMPERATURE: 98.1 F | DIASTOLIC BLOOD PRESSURE: 86 MMHG

## 2022-09-01 DIAGNOSIS — Z53.20 LEFT BEFORE TREATMENT COMPLETED: Primary | ICD-10-CM

## 2022-09-01 PROCEDURE — 99281 EMR DPT VST MAYX REQ PHY/QHP: CPT

## 2022-09-01 NOTE — ED PROVIDER NOTES
[facility] ED  Emergency Department Encounter  EmergencyMedicine Resident     Pt Jose Jignesh Stone Peer  MRN: [id number]  Alis 1990  Date of evaluation: 9/1/22  PCP:  No primary care provider on file. CHIEF COMPLAINT       Chief Complaint   Patient presents with    Fatigue    Headache       HISTORY OF PRESENT ILLNESS  (Location/Symptom, Timing/Onset, Context/Setting, Quality, Duration, Modifying Factors, Severity.)      John Parker is a 32 y.o. male who presents with fatigue and headache. He is unwilling to answer most ROS questions for me and states he is just tired. Difficult to obtain further information from patient at this time as he continues to stand up from the bed and pace around the room without answering questions. He denies head trauma or LOC, denies changes in vision. He states he is hungry. PAST MEDICAL / SURGICAL / SOCIAL / FAMILY HISTORY      has a past medical history of Anxiety, Arthritis, Asthma, Bipolar I disorder, most recent episode (or current) depressed, unspecified, Clostridium difficile infection, COPD (chronic obstructive pulmonary disease) ([address] Depression, Disease of blood and blood forming organ, Eczema, Fracture, metacarpal, Gastric ulcer, Gastritis, GERD (gastroesophageal reflux disease), GI bleed, H. pylori infection, H/O blood clots, Head injury, Headache, Insomnia, Juvenile rheumatoid arthritis ([address] Neuromuscular disorder ([address] PFAPA syndrome ([address] PUD (peptic ulcer disease), Rheumatoid arthritis ([address] Rheumatoid arthritis(714.0), Severe recurrent major depression without psychotic features ([address] Sleep apnea, 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[address] Substance abuse ([address] Suicidal ideation, Suicide attempt by hanging ([address] Tobacco dependence, Ulcerative colitis ([address] and UTI (urinary tract infection). has a past surgical history that includes Colonoscopy; bronchoscopy; other surgical history;  Upper gastrointestinal endoscopy (2/4/16); pr egd transoral biopsy single/multiple (N/A, 3/20/2017); sigmoidoscopy (N/A, 3/20/2017); Cholecystectomy, laparoscopic (07/14/2017); pr esophagogastroduodenoscopy transoral diagnostic (N/A, 8/9/2017); pr colonoscopy w/biopsy single/multiple (8/9/2017); Abdomen surgery; Upper gastrointestinal endoscopy (N/A, 6/13/2018); Upper gastrointestinal endoscopy (N/A, 9/20/2018); and Endoscopy, colon, diagnostic. Social History     Socioeconomic History    Marital status: Single     Spouse name: Not on file    Number of children: Not on file    Years of education: Not on file    Highest education level: Not on file   Occupational History    Occupation: disability   Tobacco Use    Smoking status: Every Day     Packs/day: 0.50     Years: 15.00     Pack years: 7.50     Types: Cigarettes    Smokeless tobacco: Never   Vaping Use    Vaping Use: Former   Substance and Sexual Activity    Alcohol use: Not Currently     Comment: drinks daily    Drug use: Not Currently     Types: Opiates , Cocaine     Comment: Hx of opiates, benzos, cocaine, alcohol abuse    Sexual activity: Yes     Partners: Female     Comment: Lives alone, not working. Other Topics Concern    Not on file   Social History Narrative    ** Merged History Encounter **          Social Determinants of Health     Financial Resource Strain: Medium Risk    Difficulty of Paying Living Expenses: Somewhat hard   Food Insecurity: No Food Insecurity    Worried About Running Out of Food in the Last Year: Never true    Ran Out of Food in the Last Year: Never true   Transportation Needs: No Transportation Needs    Lack of Transportation (Medical): No    Lack of Transportation (Non-Medical):  No   Physical Activity: Inactive    Days of Exercise per Week: 0 days    Minutes of Exercise per Session: 0 min   Stress: Stress Concern Present    Feeling of Stress : Rather much   Social Connections: Socially Isolated    Frequency of Communication with Friends and Family: Never    Frequency of Social Gatherings with Friends and Family: Never    Attends Yazidi Services: Never    Active Member of Clubs or Organizations: No    Attends Club or Organization Meetings: Never    Marital Status: Never    Intimate Partner Violence: Not At Risk    Fear of Current or Ex-Partner: No    Emotionally Abused: No    Physically Abused: No    Sexually Abused: No   Housing Stability: Low Risk     Unable to Pay for Housing in the Last Year: No    Number of [place] in the Last Year: 1    Unstable Housing in the Last Year: No       Family History   Problem Relation Age of Onset    Diabetes Father     Alcohol Abuse Father     Depression Father     Arthritis Father     High Blood Pressure Father     Other Father         aneurysm & epilepsy    Migraines Father     Arthritis Mother     Other Mother         aneurysm & epilepsy    Migraines Mother     Diabetes Brother         Aunt and uncles    Depression Brother     Mental Illness Brother     Other Brother         epilepsy    Migraines Brother     Stroke Other         Uncle    Other Brother         murdered Oct 6th, 2014    Colon Cancer Paternal Cousin 43    Other Sister         epilepsy    Migraines Sister        Allergies:  Dicyclomine, Famotidine, Geodon [ziprasidone hcl], Haloperidol, Iv dye [iodides], Reglan [metoclopramide], Ibuprofen, Aspirin, Dicyclomine hcl, Hydroxyzine hcl, Iodine, Metronidazole, Mirtazapine, Promethazine, and Ziprasidone    Home Medications:  Prior to Admission medications    Medication Sig Start Date End Date Taking?  Authorizing Provider   cloNIDine (CATAPRES) 0.1 MG tablet Take 1 tablet by mouth nightly for 3 days 7/14/22 7/17/22  Nonda Susan, APRN - CNP   tiZANidine (ZANAFLEX) 4 MG tablet Take 1 tablet by mouth 2 times daily as needed (muscle spasm) 7/14/22   Nonda Susan, APRN - CNP   Zinc Oxide 6 % CREA Apply 1 applicator topically in the morning and at bedtime 7/3/22   CHER Castro MD   ARIPiprazole (ABILIFY) 15 MG tablet Take 1 tablet by mouth daily 6/22/22   Cristian Goodrich MD   cetirizine (ZYRTEC) 10 MG tablet Take 1 tablet by mouth daily 6/22/22   Cristian Goodrich MD   meloxicam (MOBIC) 7.5 MG tablet Take 1 tablet by mouth 2 times daily as needed for Pain 8/19/21 10/30/21  Mayra Blizzard, APRN - CNP       REVIEW OF SYSTEMS    (2-9 systems for level 4, 10 or more for level 5)      Review of Systems   Constitutional:  Positive for fatigue. Negative for fever. HENT:  Negative for sore throat. Eyes:  Negative for visual disturbance. Respiratory:  Negative for shortness of breath. Cardiovascular:  Negative for chest pain. Gastrointestinal:  Negative for abdominal pain, nausea and vomiting. Genitourinary:  Negative for dysuria. Musculoskeletal:  Negative for gait problem. Neurological:  Positive for headaches. Negative for dizziness, syncope and light-headedness. PHYSICAL EXAM   (up to 7 for level 4, 8 or more for level 5)      INITIAL VITALS:   /86   Pulse 71   Temp 98.1 °F (36.7 °C)   Resp 18   SpO2 92%     Physical Exam  Vitals reviewed. HENT:      Head: Normocephalic. Nose: Nose normal.      Mouth/Throat:      Mouth: Mucous membranes are dry. Pharynx: Oropharynx is clear. Eyes:      Extraocular Movements: Extraocular movements intact. Pupils: Pupils are equal, round, and reactive to light. Cardiovascular:      Rate and Rhythm: Normal rate and regular rhythm. Pulses: Normal pulses. Heart sounds: Normal heart sounds. Pulmonary:      Effort: Pulmonary effort is normal.      Breath sounds: Normal breath sounds. No stridor. No wheezing or rhonchi. Abdominal:      Palpations: Abdomen is soft. Tenderness: There is no abdominal tenderness. There is no guarding or rebound. Musculoskeletal:         General: Normal range of motion. Cervical back: Normal range of motion. Skin:     Capillary Refill: Capillary refill takes less than 2 seconds. Findings: No rash. Efforts were made to edit the dictations but occasionally words are mis-transcribed.)       Barry Ye MD  Resident  09/06/22 2953

## 2022-09-01 NOTE — ED NOTES
Pt at nurses station asking for food and water and assistance to bola.       Kamila Charles RN  09/01/22 [id number]

## 2022-09-01 NOTE — ED NOTES
Pt to room 22 with c/o fatigue and headache. Pt appears drowsy during triage, but states that he had no where to sleep last night. Pt reports that he is having headache pain. Pt reports that he has someplace to sleep tonight. Patient placed on continuous cardiac monitor, bp and pulse ox.         Velinda Merlin, RN  09/01/22 [id number]

## 2022-09-02 ENCOUNTER — APPOINTMENT (OUTPATIENT)
Dept: GENERAL RADIOLOGY | Age: 32
End: 2022-09-02
Payer: MEDICARE

## 2022-09-02 ENCOUNTER — HOSPITAL ENCOUNTER (EMERGENCY)
Age: 32
Discharge: HOME | End: 2022-09-02
Attending: EMERGENCY MEDICINE
Payer: MEDICARE

## 2022-09-02 VITALS
RESPIRATION RATE: 19 BRPM | OXYGEN SATURATION: 94 % | DIASTOLIC BLOOD PRESSURE: 80 MMHG | TEMPERATURE: 98.9 F | HEART RATE: 110 BPM | SYSTOLIC BLOOD PRESSURE: 137 MMHG

## 2022-09-02 DIAGNOSIS — R55 SYNCOPE AND COLLAPSE: Primary | ICD-10-CM

## 2022-09-02 LAB
ABSOLUTE EOS #: 0.11 K/UL (ref 0–0.44)
ABSOLUTE IMMATURE GRANULOCYTE: <0.03 K/UL (ref 0–0.3)
ABSOLUTE LYMPH #: 2.09 K/UL (ref 1.1–3.7)
ABSOLUTE MONO #: 0.59 K/UL (ref 0.1–1.2)
ANION GAP SERPL CALCULATED.3IONS-SCNC: 16 MMOL/L (ref 9–17)
BASOPHILS # BLD: 0 % (ref 0–2)
BASOPHILS ABSOLUTE: 0.03 K/UL (ref 0–0.2)
BUN BLDV-MCNC: 14 MG/DL (ref 6–20)
CALCIUM SERPL-MCNC: 8.9 MG/DL (ref 8.6–10.4)
CHLORIDE BLD-SCNC: 105 MMOL/L (ref 98–107)
CO2: 24 MMOL/L (ref 20–31)
CREAT SERPL-MCNC: 0.84 MG/DL (ref 0.7–1.2)
EOSINOPHILS RELATIVE PERCENT: 2 % (ref 1–4)
GFR AFRICAN AMERICAN: >60 ML/MIN
GFR NON-AFRICAN AMERICAN: >60 ML/MIN
GFR SERPL CREATININE-BSD FRML MDRD: ABNORMAL ML/MIN/{1.73_M2}
GLUCOSE BLD-MCNC: 89 MG/DL (ref 70–99)
HCT VFR BLD CALC: 36.5 % (ref 40.7–50.3)
HEMOGLOBIN: 12.1 G/DL (ref 13–17)
IMMATURE GRANULOCYTES: 0 %
LYMPHOCYTES # BLD: 30 % (ref 24–43)
MCH RBC QN AUTO: 30.3 PG (ref 25.2–33.5)
MCHC RBC AUTO-ENTMCNC: 33.2 G/DL (ref 28.4–34.8)
MCV RBC AUTO: 91.5 FL (ref 82.6–102.9)
MONOCYTES # BLD: 8 % (ref 3–12)
NRBC AUTOMATED: 0 PER 100 WBC
PDW BLD-RTO: 13.2 % (ref 11.8–14.4)
PLATELET # BLD: 274 K/UL (ref 138–453)
PMV BLD AUTO: 9.8 FL (ref 8.1–13.5)
POTASSIUM SERPL-SCNC: 4.3 MMOL/L (ref 3.7–5.3)
RBC # BLD: 3.99 M/UL (ref 4.21–5.77)
SEG NEUTROPHILS: 60 % (ref 36–65)
SEGMENTED NEUTROPHILS ABSOLUTE COUNT: 4.25 K/UL (ref 1.5–8.1)
SODIUM BLD-SCNC: 145 MMOL/L (ref 135–144)
TROPONIN, HIGH SENSITIVITY: 8 NG/L (ref 0–22)
WBC # BLD: 7.1 K/UL (ref 3.5–11.3)

## 2022-09-02 PROCEDURE — 96374 THER/PROPH/DIAG INJ IV PUSH: CPT

## 2022-09-02 PROCEDURE — 80048 BASIC METABOLIC PNL TOTAL CA: CPT

## 2022-09-02 PROCEDURE — 84484 ASSAY OF TROPONIN QUANT: CPT

## 2022-09-02 PROCEDURE — 2580000003 HC RX 258: Performed by: STUDENT IN AN ORGANIZED HEALTH CARE EDUCATION/TRAINING PROGRAM

## 2022-09-02 PROCEDURE — 93005 ELECTROCARDIOGRAM TRACING: CPT

## 2022-09-02 PROCEDURE — 6360000002 HC RX W HCPCS: Performed by: STUDENT IN AN ORGANIZED HEALTH CARE EDUCATION/TRAINING PROGRAM

## 2022-09-02 PROCEDURE — 99284 EMERGENCY DEPT VISIT MOD MDM: CPT

## 2022-09-02 PROCEDURE — 85025 COMPLETE CBC W/AUTO DIFF WBC: CPT

## 2022-09-02 RX ORDER — NALOXONE HYDROCHLORIDE 0.4 MG/ML
0.4 INJECTION, SOLUTION INTRAMUSCULAR; INTRAVENOUS; SUBCUTANEOUS ONCE
Status: COMPLETED | OUTPATIENT
Start: 2022-09-02 | End: 2022-09-02

## 2022-09-02 RX ORDER — 0.9 % SODIUM CHLORIDE 0.9 %
1000 INTRAVENOUS SOLUTION INTRAVENOUS ONCE
Status: COMPLETED | OUTPATIENT
Start: 2022-09-02 | End: 2022-09-02

## 2022-09-02 RX ADMIN — NALOXONE HYDROCHLORIDE 0.4 MG: 0.4 INJECTION, SOLUTION INTRAMUSCULAR; INTRAVENOUS; SUBCUTANEOUS at 14:23

## 2022-09-02 RX ADMIN — SODIUM CHLORIDE 1000 ML: 9 INJECTION, SOLUTION INTRAVENOUS at 14:21

## 2022-09-02 ASSESSMENT — PAIN - FUNCTIONAL ASSESSMENT: PAIN_FUNCTIONAL_ASSESSMENT: NONE - DENIES PAIN

## 2022-09-02 NOTE — ED PROVIDER NOTES
[facility]     Emergency Department     Faculty Attestation    I performed a history and physical examination of the patient and discussed management with the resident. I reviewed the residents note and agree with the documented findings and plan of care. Any areas of disagreement are noted on the chart. I was personally present for the key portions of any procedures. I have documented in the chart those procedures where I was not present during the key portions. I have reviewed the emergency nurses triage note. I agree with the chief complaint, past medical history, past surgical history, allergies, medications, social and family history as documented unless otherwise noted below. For Physician Assistant/ Nurse Practitioner cases/documentation I have personally evaluated this patient and have completed at least one if not all key elements of the E/M (history, physical exam, and MDM). Additional findings are as noted. Reported overdose, improved with Narcan by EMS. Patient denies any ingestion. Mild slurring of words but otherwise appropriate. Normal pupils, speaking in full sentences. Will monitor and observe, possible discharge. 2:29 PM EDT  Called back to bedside, pt more somnolent but trying to leave. Redirected back to the bed and agreeable with workup. Sinus tachycardia 104, normal intervals, normal axis, no acute ST or T changes. Critical Care     CRITICAL CARE: There was a high probability of clinically significant/life threatening deterioration in this patient's condition which required my urgent intervention. Total critical care time was less than minutes. This excludes any time for separately reportable procedures.        Shell Cotton MD, Cathie Saunders  Attending Emergency  Physician           Shell Cotton MD  09/02/22 8618

## 2022-09-02 NOTE — ED PROVIDER NOTES
[address] ED  Emergency Department Encounter  EmergencyMedicine Resident     Pt Rosa M Harmon Kidney  MRN: [id number]  Darcigfmohinder 1990  Date of evaluation: 9/2/22  PCP:  No primary care provider on file. CHIEF COMPLAINT       Chief Complaint   Patient presents with    Loss of Consciousness       HISTORY OF PRESENT ILLNESS  (Location/Symptom, Timing/Onset, Context/Setting, Quality, Duration, Modifying Factors, Severity.)      Kamron Nj is a 32 y.o. male who presents with concern for drug overdose. Patient was found down by EMS, minimally responsive. Patient was given 2 mg of Narcan in the field with improvement in mentation. Question of whether patient had seizure-like activity prior to arrival.  On arrival, patient states he has a headache from \"all the skin in my head\". Patient denies any drug or alcohol use. He denies any pain besides a headache. Denies any head trauma or LOC. He denies chest pain, shortness breath, abdominal pain, nausea, vomiting or diarrhea, fever, chills.     PAST MEDICAL / SURGICAL / SOCIAL / FAMILY HISTORY      has a past medical history of Anxiety, Arthritis, Asthma, Bipolar I disorder, most recent episode (or current) depressed, unspecified, Clostridium difficile infection, COPD (chronic obstructive pulmonary disease) ([address] Depression, Disease of blood and blood forming organ, Eczema, Fracture, metacarpal, Gastric ulcer, Gastritis, GERD (gastroesophageal reflux disease), GI bleed, H. pylori infection, H/O blood clots, Head injury, Headache, Insomnia, Juvenile rheumatoid arthritis ([address] Neuromuscular disorder ([address] PFAPA syndrome ([address] PUD (peptic ulcer disease), Rheumatoid arthritis ([address] Rheumatoid arthritis(714.0), Severe recurrent major depression without psychotic features ([address] Sleep apnea, 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[address] Substance abuse ([address] Suicidal ideation, Suicide attempt by hanging ([address] Tobacco dependence, Ulcerative colitis ([address] and UTI (urinary tract infection). has a past surgical history that includes Colonoscopy; bronchoscopy; other surgical history; Upper gastrointestinal endoscopy (2/4/16); pr egd transoral biopsy single/multiple (N/A, 3/20/2017); sigmoidoscopy (N/A, 3/20/2017); Cholecystectomy, laparoscopic (07/14/2017); pr esophagogastroduodenoscopy transoral diagnostic (N/A, 8/9/2017); pr colonoscopy w/biopsy single/multiple (8/9/2017); Abdomen surgery; Upper gastrointestinal endoscopy (N/A, 6/13/2018); Upper gastrointestinal endoscopy (N/A, 9/20/2018); and Endoscopy, colon, diagnostic. Social History     Socioeconomic History    Marital status: Single     Spouse name: Not on file    Number of children: Not on file    Years of education: Not on file    Highest education level: Not on file   Occupational History    Occupation: disability   Tobacco Use    Smoking status: Every Day     Packs/day: 0.50     Years: 15.00     Pack years: 7.50     Types: Cigarettes    Smokeless tobacco: Never   Vaping Use    Vaping Use: Former   Substance and Sexual Activity    Alcohol use: Not Currently     Comment: drinks daily    Drug use: Not Currently     Types: Opiates , Cocaine     Comment: Hx of opiates, benzos, cocaine, alcohol abuse    Sexual activity: Yes     Partners: Female     Comment: Lives alone, not working. Other Topics Concern    Not on file   Social History Narrative    ** Merged History Encounter **          Social Determinants of Health     Financial Resource Strain: Medium Risk    Difficulty of Paying Living Expenses: Somewhat hard   Food Insecurity: No Food Insecurity    Worried About Running Out of Food in the Last Year: Never true    Ran Out of Food in the Last Year: Never true   Transportation Needs: No Transportation Needs    Lack of Transportation (Medical): No    Lack of Transportation (Non-Medical):  No   Physical Activity: Inactive    Days of Exercise per Week: 0 days    Minutes of Exercise per Session: 0 min Stress: Stress Concern Present    Feeling of Stress : Rather much   Social Connections: Socially Isolated    Frequency of Communication with Friends and Family: Never    Frequency of Social Gatherings with Friends and Family: Never    Attends Hinduism Services: Never    Active Member of Clubs or Organizations: No    Attends Club or Organization Meetings: Never    Marital Status: Never    Intimate Partner Violence: Not At Risk    Fear of Current or Ex-Partner: No    Emotionally Abused: No    Physically Abused: No    Sexually Abused: No   Housing Stability: Low Risk     Unable to Pay for Housing in the Last Year: No    Number of [place] in the Last Year: 1    Unstable Housing in the Last Year: No       Family History   Problem Relation Age of Onset    Diabetes Father     Alcohol Abuse Father     Depression Father     Arthritis Father     High Blood Pressure Father     Other Father         aneurysm & epilepsy    Migraines Father     Arthritis Mother     Other Mother         aneurysm & epilepsy    Migraines Mother     Diabetes Brother         Aunt and uncles    Depression Brother     Mental Illness Brother     Other Brother         epilepsy    Migraines Brother     Stroke Other         Uncle    Other Brother         murdered Oct 6th, 2014    Colon Cancer Paternal Cousin 43    Other Sister         epilepsy    Migraines Sister        Allergies:  Dicyclomine, Famotidine, Geodon [ziprasidone hcl], Haloperidol, Iv dye [iodides], Reglan [metoclopramide], Ibuprofen, Aspirin, Dicyclomine hcl, Hydroxyzine hcl, Iodine, Metronidazole, Mirtazapine, Promethazine, and Ziprasidone    Home Medications:  Prior to Admission medications    Medication Sig Start Date End Date Taking?  Authorizing Provider   amoxicillin (AMOXIL) 500 MG capsule Take 1 capsule by mouth 2 times daily for 7 days 8/26/22 9/2/22  Jay Antoine DO   cloNIDine (CATAPRES) 0.1 MG tablet Take 1 tablet by mouth nightly for 3 days 7/14/22 7/17/22 or rebound. Musculoskeletal:         General: Normal range of motion. Cervical back: Normal range of motion and neck supple. Skin:     Capillary Refill: Capillary refill takes less than 2 seconds. Findings: No rash. Neurological:      General: No focal deficit present. Mental Status: He is alert. He is disoriented. Sensory: No sensory deficit. Motor: No weakness. Comments: Confused verbal response, GCS 14. Airway intact.        DIFFERENTIAL  DIAGNOSIS     PLAN (LABS / IMAGING / EKG):  Orders Placed This Encounter   Procedures    Troponin    CBC with Auto Differential    Basic Metabolic Panel    EKG 12 Lead       MEDICATIONS ORDERED:  Orders Placed This Encounter   Medications    0.9 % sodium chloride bolus    naloxone (NARCAN) injection 0.4 mg       DDX: Intoxication v overdose v ingestion v electrolyte abnormality v ACS/MI v PE v infectious process    DIAGNOSTIC RESULTS / EMERGENCY DEPARTMENT COURSE / MDM   LAB RESULTS:  Results for orders placed or performed during the hospital encounter of 09/02/22   Troponin   Result Value Ref Range    Troponin, High Sensitivity 8 0 - 22 ng/L   CBC with Auto Differential   Result Value Ref Range    WBC 7.1 3.5 - 11.3 k/uL    RBC 3.99 (L) 4.21 - 5.77 m/uL    Hemoglobin 12.1 (L) 13.0 - 17.0 g/dL    Hematocrit 36.5 (L) 40.7 - 50.3 %    MCV 91.5 82.6 - 102.9 fL    MCH 30.3 25.2 - 33.5 pg    MCHC 33.2 28.4 - 34.8 g/dL    RDW 13.2 11.8 - 14.4 %    Platelets 892 284 - 529 k/uL    MPV 9.8 8.1 - 13.5 fL    NRBC Automated 0.0 0.0 per 100 WBC    Seg Neutrophils 60 36 - 65 %    Lymphocytes 30 24 - 43 %    Monocytes 8 3 - 12 %    Eosinophils % 2 1 - 4 %    Basophils 0 0 - 2 %    Immature Granulocytes 0 0 %    Segs Absolute 4.25 1.50 - 8.10 k/uL    Absolute Lymph # 2.09 1.10 - 3.70 k/uL    Absolute Mono # 0.59 0.10 - 1.20 k/uL    Absolute Eos # 0.11 0.00 - 0.44 k/uL    Basophils Absolute 0.03 0.00 - 0.20 k/uL    Absolute Immature Granulocyte <0.03 0.00 - 0.30 k/uL   Basic Metabolic Panel   Result Value Ref Range    Glucose 89 70 - 99 mg/dL    BUN 14 6 - 20 mg/dL    Creatinine 0.84 0.70 - 1.20 mg/dL    Calcium 8.9 8.6 - 10.4 mg/dL    Sodium 145 (H) 135 - 144 mmol/L    Potassium 4.3 3.7 - 5.3 mmol/L    Chloride 105 98 - 107 mmol/L    CO2 24 20 - 31 mmol/L    Anion Gap 16 9 - 17 mmol/L    GFR Non-African American >60 >60 mL/min    GFR African American >60 >60 mL/min    GFR Comment             IMPRESSION: 70-year-old male presents with altered mental status. Patient was found down by EMS, unknown downtime. Patient was minimally responsive for EMS, was given 2 mg Narcan at which point he became responsive for them. On arrival, patient remains mildly obtunded. He denies any pain aside from a headache from \"all the skin in his head\". He denies any head trauma or LOC. GCS 14 for mildly confused response. A&Ox3. Patient denying seizure-like activity. Patient with initial SPO2 sat 88% on room air, was placed on 3 L nasal cannula with improvement in SPO2 to 94%. Initial lab work attempted to be collected, however patient initially combative and agitated stating \"you can all go to hell and burn\". He initially refused blood draws, was offered a turkey sandwich and water, which point he became more cooperative. Lab work unremarkable. Unable to obtain chest x-ray due to patient agitation. Patient monitored closely, respirations then decreased to 7 after approximately 1 hour in the ED. Patient given 0.4 mg Narcan in the ED, shortly thereafter awakened and was more alert, began ripping off his leads and stating he wanted to leave. ED attending and I were at bedside, convince patient to stay for further evaluation. Patient continues to deny using any alcohol or drugs. However shortly thereafter, patient eloped from the ED. Nurse was able to remove IV prior to his elopement. Patient was educated to return for any worsening symptoms.     EKG  EKG: sinus tachycardia, rate 108. Normal axis. No ST elevation or depression. No T wave abnormalities noted. All EKG's are interpreted by the Emergency Department Physician who either signs or Co-signs this chart in the absence of a cardiologist.    EMERGENCY DEPARTMENT COURSE:  ED Course as of 09/02/22 1454   Fri Sep 02, 2022   1403 Patient found down, unknown down time. Was minimally responsive per EMS. Patient given 2 mg Narcan at which point he became more responsive. Per EMS, patient did have some seizure like activity prior to arrival that ceased spontaneously. No seizure activity noted on arrival. Patient GCS 14, confused verbal response. A&Ox4. States he has a headache from all \"the skin in his head\". Denying other medical complaints at this time. Patient became agitated and refused blood draw, cursing at and threatening nurses, was offered a sandwich and water at which point he became calm and agreed to blood draw. [JG]   1428 Patient respirations down to 7. SpO2 94% on 3L NC. Patient given 0.4 mg Narcan, shortly thereafter awakened, was more alert. Began ripping off his leads and stating he wanted to leave. Dr. Anish Coronado and I were at bedside, convinced patient to stay for further evaluation. Patient appears unsteady on his feet, states he needs to urinate, provided with urinal. Patient denies using any alcohol or drugs. Patient agrees to stay for further workup. [JG]   1454 Patient eloped from department. Nurse was able to remove IV prior to patient elopement. [JG]      ED Course User Index  [JG] Padmaja Amanda MD       CONSULTS:  None    FINAL IMPRESSION      1. Syncope and collapse          DISPOSITION / PLAN     DISPOSITION Eloped - Left Before Treatment Complete 09/02/2022 02:43:34 PM      PATIENT REFERRED TO:  No follow-up provider specified.     DISCHARGE MEDICATIONS:  Discharge Medication List as of 9/2/2022  2:47 PM          Padmaja Amanda MD  Emergency Medicine Resident    (Please note that portions of

## 2022-09-04 NOTE — ED PROVIDER NOTES
[facility] ED     Emergency Department     Faculty Attestation    I performed a history and physical examination of the patient and discussed management with the resident. I reviewed the residents note and agree with the documented findings and plan of care. Any areas of disagreement are noted on the chart. I was personally present for the key portions of any procedures. I have documented in the chart those procedures where I was not present during the key portions. I have reviewed the emergency nurses triage note. I agree with the chief complaint, past medical history, past surgical history, allergies, medications, social and family history as documented unless otherwise noted below. For Physician Assistant/ Nurse Practitioner cases/documentation I have personally evaluated this patient and have completed at least one if not all key elements of the E/M (history, physical exam, and MDM). Additional findings are as noted. Patient here with a myriad of complaints inconsistent providers. .  Patient ambulating without difficulty and just walked back from the bathroom. [place] normal pupils normal mental status.       Critical Care     none    Dudley Hart MD, Flory Harris  Attending Emergency  Physician           Dudley Hart MD  09/04/22 [id number]

## 2022-09-06 LAB
EKG ATRIAL RATE: 104 BPM
EKG P AXIS: 66 DEGREES
EKG P-R INTERVAL: 132 MS
EKG Q-T INTERVAL: 340 MS
EKG QRS DURATION: 78 MS
EKG QTC CALCULATION (BAZETT): 447 MS
EKG R AXIS: 44 DEGREES
EKG T AXIS: 35 DEGREES
EKG VENTRICULAR RATE: 104 BPM

## 2022-09-06 ASSESSMENT — ENCOUNTER SYMPTOMS
SHORTNESS OF BREATH: 0
SORE THROAT: 0
NAUSEA: 0
ABDOMINAL PAIN: 0
VOMITING: 0

## 2022-10-06 ENCOUNTER — HOSPITAL ENCOUNTER (EMERGENCY)
Age: 32
Discharge: LEFT BEFORE DISCHARGE | End: 2022-10-06

## 2022-10-06 ENCOUNTER — APPOINTMENT (OUTPATIENT)
Dept: GENERAL RADIOLOGY | Age: 32
End: 2022-10-06
Payer: MEDICARE

## 2022-10-06 ENCOUNTER — HOSPITAL ENCOUNTER (EMERGENCY)
Age: 32
Discharge: LEFT BEFORE DISCHARGE | End: 2022-10-06
Attending: EMERGENCY MEDICINE
Payer: MEDICARE

## 2022-10-06 ENCOUNTER — APPOINTMENT (OUTPATIENT)
Dept: CT IMAGING | Age: 32
End: 2022-10-06
Payer: MEDICARE

## 2022-10-06 VITALS
RESPIRATION RATE: 11 BRPM | TEMPERATURE: 98.3 F | HEART RATE: 87 BPM | BODY MASS INDEX: 32.89 KG/M2 | SYSTOLIC BLOOD PRESSURE: 143 MMHG | OXYGEN SATURATION: 95 % | DIASTOLIC BLOOD PRESSURE: 70 MMHG | WEIGHT: 210 LBS

## 2022-10-06 DIAGNOSIS — R41.82 ALTERED MENTAL STATUS, UNSPECIFIED ALTERED MENTAL STATUS TYPE: Primary | ICD-10-CM

## 2022-10-06 LAB
ABSOLUTE EOS #: 0.1 K/UL (ref 0–0.44)
ABSOLUTE IMMATURE GRANULOCYTE: 0.07 K/UL (ref 0–0.3)
ABSOLUTE LYMPH #: 2.51 K/UL (ref 1.1–3.7)
ABSOLUTE MONO #: 0.65 K/UL (ref 0.1–1.2)
ANION GAP SERPL CALCULATED.3IONS-SCNC: 17 MMOL/L (ref 9–17)
ANION GAP: 13 MMOL/L (ref 7–16)
BASOPHILS # BLD: 0 % (ref 0–2)
BASOPHILS ABSOLUTE: <0.03 K/UL (ref 0–0.2)
BUN BLDV-MCNC: 21 MG/DL (ref 6–20)
CALCIUM SERPL-MCNC: 8.9 MG/DL (ref 8.6–10.4)
CHLORIDE BLD-SCNC: 99 MMOL/L (ref 98–107)
CO2: 23 MMOL/L (ref 20–31)
CREAT SERPL-MCNC: 1.16 MG/DL (ref 0.7–1.2)
EGFR, POC: >60 ML/MIN/1.73M2
EOSINOPHILS RELATIVE PERCENT: 1 % (ref 1–4)
GFR NON-AFRICAN AMERICAN: >60 ML/MIN
GFR SERPL CREATININE-BSD FRML MDRD: >60 ML/MIN
GFR SERPL CREATININE-BSD FRML MDRD: >60 ML/MIN/1.73M2
GLUCOSE BLD-MCNC: 159 MG/DL (ref 70–99)
GLUCOSE BLD-MCNC: 159 MG/DL (ref 74–100)
HCO3 VENOUS: 23.9 MMOL/L (ref 22–29)
HCT VFR BLD CALC: 39.6 % (ref 40.7–50.3)
HEMOGLOBIN: 12.8 G/DL (ref 13–17)
IMMATURE GRANULOCYTES: 1 %
LYMPHOCYTES # BLD: 30 % (ref 24–43)
MCH RBC QN AUTO: 30 PG (ref 25.2–33.5)
MCHC RBC AUTO-ENTMCNC: 32.3 G/DL (ref 28.4–34.8)
MCV RBC AUTO: 92.7 FL (ref 82.6–102.9)
MONOCYTES # BLD: 8 % (ref 3–12)
NEGATIVE BASE EXCESS, VEN: 5 (ref 0–2)
NRBC AUTOMATED: 0 PER 100 WBC
O2 SAT, VEN: 67 % (ref 60–85)
PCO2, VEN: 58.2 MM HG (ref 41–51)
PDW BLD-RTO: 12.5 % (ref 11.8–14.4)
PH VENOUS: 7.22 (ref 7.32–7.43)
PLATELET # BLD: 227 K/UL (ref 138–453)
PMV BLD AUTO: 10.1 FL (ref 8.1–13.5)
PO2, VEN: 42.5 MM HG (ref 30–50)
POC BUN: 20 MG/DL (ref 8–26)
POC CHLORIDE: 104 MMOL/L (ref 98–107)
POC CREATININE: 1.22 MG/DL (ref 0.51–1.19)
POC HEMATOCRIT: 40 % (ref 41–53)
POC HEMOGLOBIN: 13.5 G/DL (ref 13.5–17.5)
POC IONIZED CALCIUM: 1.23 MMOL/L (ref 1.15–1.33)
POC LACTIC ACID: 7.89 MMOL/L (ref 0.56–1.39)
POC POTASSIUM: 3.5 MMOL/L (ref 3.5–4.5)
POC SODIUM: 141 MMOL/L (ref 138–146)
POC TCO2: 25 MMOL/L (ref 22–30)
POTASSIUM SERPL-SCNC: 3.6 MMOL/L (ref 3.7–5.3)
RBC # BLD: 4.27 M/UL (ref 4.21–5.77)
SEG NEUTROPHILS: 60 % (ref 36–65)
SEGMENTED NEUTROPHILS ABSOLUTE COUNT: 5.16 K/UL (ref 1.5–8.1)
SODIUM BLD-SCNC: 139 MMOL/L (ref 135–144)
WBC # BLD: 8.5 K/UL (ref 3.5–11.3)

## 2022-10-06 PROCEDURE — 96374 THER/PROPH/DIAG INJ IV PUSH: CPT

## 2022-10-06 PROCEDURE — 84520 ASSAY OF UREA NITROGEN: CPT

## 2022-10-06 PROCEDURE — 82803 BLOOD GASES ANY COMBINATION: CPT

## 2022-10-06 PROCEDURE — 93005 ELECTROCARDIOGRAM TRACING: CPT

## 2022-10-06 PROCEDURE — 80048 BASIC METABOLIC PNL TOTAL CA: CPT

## 2022-10-06 PROCEDURE — 71045 X-RAY EXAM CHEST 1 VIEW: CPT

## 2022-10-06 PROCEDURE — 85025 COMPLETE CBC W/AUTO DIFF WBC: CPT

## 2022-10-06 PROCEDURE — 85014 HEMATOCRIT: CPT

## 2022-10-06 PROCEDURE — 6360000002 HC RX W HCPCS

## 2022-10-06 PROCEDURE — 96361 HYDRATE IV INFUSION ADD-ON: CPT

## 2022-10-06 PROCEDURE — 2580000003 HC RX 258

## 2022-10-06 PROCEDURE — 80051 ELECTROLYTE PANEL: CPT

## 2022-10-06 PROCEDURE — 82330 ASSAY OF CALCIUM: CPT

## 2022-10-06 PROCEDURE — 82565 ASSAY OF CREATININE: CPT

## 2022-10-06 PROCEDURE — 82947 ASSAY GLUCOSE BLOOD QUANT: CPT

## 2022-10-06 PROCEDURE — 83605 ASSAY OF LACTIC ACID: CPT

## 2022-10-06 PROCEDURE — 99285 EMERGENCY DEPT VISIT HI MDM: CPT

## 2022-10-06 RX ORDER — DIPHENHYDRAMINE HYDROCHLORIDE 50 MG/ML
25 INJECTION INTRAMUSCULAR; INTRAVENOUS ONCE
Status: COMPLETED | OUTPATIENT
Start: 2022-10-06 | End: 2022-10-06

## 2022-10-06 RX ORDER — SODIUM CHLORIDE, SODIUM LACTATE, POTASSIUM CHLORIDE, AND CALCIUM CHLORIDE .6; .31; .03; .02 G/100ML; G/100ML; G/100ML; G/100ML
1000 INJECTION, SOLUTION INTRAVENOUS ONCE
Status: COMPLETED | OUTPATIENT
Start: 2022-10-06 | End: 2022-10-06

## 2022-10-06 RX ADMIN — DIPHENHYDRAMINE HYDROCHLORIDE 25 MG: 50 INJECTION, SOLUTION INTRAMUSCULAR; INTRAVENOUS at 14:47

## 2022-10-06 RX ADMIN — SODIUM CHLORIDE, POTASSIUM CHLORIDE, SODIUM LACTATE AND CALCIUM CHLORIDE 1000 ML: 600; 310; 30; 20 INJECTION, SOLUTION INTRAVENOUS at 12:14

## 2022-10-06 ASSESSMENT — PAIN SCALES - GENERAL: PAINLEVEL_OUTOF10: 7

## 2022-10-06 ASSESSMENT — PAIN - FUNCTIONAL ASSESSMENT: PAIN_FUNCTIONAL_ASSESSMENT: 0-10

## 2022-10-06 ASSESSMENT — PAIN DESCRIPTION - LOCATION: LOCATION: HIP;NECK

## 2022-10-06 NOTE — ED PROVIDER NOTES
[facility]     Emergency Department     Faculty Attestation    I performed a history and physical examination of the patient and discussed management with the resident. I have reviewed and agree with the residents findings including all diagnostic interpretations, and treatment plans as written at the time of my review. Any areas of disagreement are noted on the chart. I was personally present for the key portions of any procedures. I have documented in the chart those procedures where I was not present during the key portions. For Physician Assistant/ Nurse Practitioner cases/documentation I have personally evaluated this patient and have completed at least one if not all key elements of the E/M (history, physical exam, and MDM). Additional findings are as noted. Primary Care Physician: No primary care provider on file. History: This is a 32 y.o. male who presents to the Emergency Department with complaint of altered mental status. Patient was found to be on the ground by EMS. He says he fell from a standing position is complaining of neck pain. Per EMS he seemed altered. Patient has a history of bipolar disease. Physical:   weight is 210 lb (95.3 kg). His blood pressure is 140/91 (abnormal) and his pulse is 97. His respiration is 17 and oxygen saturation is 91%. Patient does have some midline C-spine tenderness he is in a hard cervical collar. Lungs clear auscultation, heart regular rate and rhythm, abdomen soft nontender patient moves all extremities well.     Impression: Fall, neck pain    Plan: CT, EKG, CBC, BMP      EKG Interpretation    Interpreted by me    Rhythm: normal sinus   Rate: normal  Axis: normal  Ectopy: none  Conduction: normal  ST Segments: no acute change  T Waves: no acute change  Q Waves: none    Clinical Impression: no acute changes and normal EKG      (Please note that portions of this note were completed with a voice recognition program.  Efforts were made to edit the dictations but occasionally words are mis-transcribed.)    Birdie Sheikh.  Kurtis Awan MD, [facility]  Attending Emergency Medicine Physician        Francesca Zaldivar MD  10/06/22 [id number]

## 2022-10-06 NOTE — ED NOTES
Pt refused CT \"because I have a computer in my brain and you guys can't mess that up. \" pt began crying stating he cannot get it done and \"just needs a minute\"     Amaury Boateng RN  10/06/22 [id number]

## 2022-10-06 NOTE — ED NOTES
Pt states that he thinks the cigarettes he was given were laced with something. Pt brought them to hospital, security called to confiscate the cigarettes.       Jason Tripp RN  10/06/22 [id number]

## 2022-10-06 NOTE — ED NOTES
Pt pulled IV out. PT redirected back into bed. Dr. Vanessa Mcfarlane notified.       Malick Mclaughlin RN  10/06/22 [id number]

## 2022-10-06 NOTE — ED NOTES
Pt resting on cot, no acute distress noted, RR even and NL. Pt remains on full monitor, call light within reach. Pt placed back on 2L of O2 after pt removed it, pt SpO2 back up to 93%.       Malick Mclaughlin RN  10/06/22 [id number]

## 2022-10-06 NOTE — ED NOTES
Pt to ER by [facility] after being found down. Per EMS pt was found \"stumbling\" before falling to the ground. EMS proceeded with 2mg of Narcan IN and 2mg of narcan IV. EMS stated pt responded to the IV narcan over the IN. When responsive, pt was found to be confused but responsive. EMS placed pt on NRB mask. Pt placed in c collar for complaints of neck and back pain. Upon arrival, pt was placed on 2L of O2 after a SpO2 of 90%. Pt speaking in full sentences, but slow to respond. Pt is alert and oriented. No acute distress noted, RR even and NL. Dr. Jaycee Mccallum and Dr. Bell Pulse at bedside to evaluate pt.       Viral Martinez RN  10/06/22 2383

## 2022-10-06 NOTE — ED PROVIDER NOTES
[facility]  ED  Emergency Department Encounter  Emergency Medicine Resident     Pt Nano Kia Aiken  MRN: [id number]  [place] 1990  Date of evaluation: 10/6/22  PCP:  No primary care provider on file. CHIEF COMPLAINT       Chief Complaint   Patient presents with    Altered Mental Status     2mg Narcan IN, 2mg IV       HISTORY OF PRESENT ILLNESS  (Location/Symptom, Timing/Onset, Context/Setting, Quality, Duration, Modifying Factors, Severity.)      Eric Ortiz is a 32 y.o. male who presents with complaints of altered mental status and was brought in by paramedics after being found down in grass. Was initially found unresponsive per paramedics and was given 4 mg in total of Narcan with improvement in mentation. Patient denied any illegal drug use but did note he smoked a \"cigarette\". No SI or HI. Patient complains of neck pain. No headache, numbness, weakness, tingling. No chest pain, shortness of breath. Patient alert and oriented on arrival to ED.     PAST MEDICAL / SURGICAL / SOCIAL / FAMILY HISTORY      has a past medical history of Anxiety, Arthritis, Asthma, Bipolar I disorder, most recent episode (or current) depressed, unspecified, Clostridium difficile infection, COPD (chronic obstructive pulmonary disease) ([address] Depression, Disease of blood and blood forming organ, Eczema, Fracture, metacarpal, Gastric ulcer, Gastritis, GERD (gastroesophageal reflux disease), GI bleed, H. pylori infection, H/O blood clots, Head injury, Headache, Insomnia, Juvenile rheumatoid arthritis ([address] Neuromuscular disorder ([address] PFAPA syndrome ([address] PUD (peptic ulcer disease), Rheumatoid arthritis ([address] Rheumatoid arthritis(714.0), Severe recurrent major depression without psychotic features ([address] Sleep apnea, 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[address] Substance abuse ([address] Suicidal ideation, Suicide attempt by hanging ([address] Tobacco dependence, Ulcerative colitis ([address] and UTI (urinary tract infection). Reviewed with patient     has a past surgical history that includes Colonoscopy; bronchoscopy; other surgical history; Upper gastrointestinal endoscopy (2/4/16); pr egd transoral biopsy single/multiple (N/A, 3/20/2017); sigmoidoscopy (N/A, 3/20/2017); Cholecystectomy, laparoscopic (07/14/2017); pr esophagogastroduodenoscopy transoral diagnostic (N/A, 8/9/2017); pr colonoscopy w/biopsy single/multiple (8/9/2017); Abdomen surgery; Upper gastrointestinal endoscopy (N/A, 6/13/2018); Upper gastrointestinal endoscopy (N/A, 9/20/2018); and Endoscopy, colon, diagnostic. Reviewed with patient    Social History     Socioeconomic History    Marital status: Single     Spouse name: Not on file    Number of children: Not on file    Years of education: Not on file    Highest education level: Not on file   Occupational History    Occupation: disability   Tobacco Use    Smoking status: Every Day     Packs/day: 0.50     Years: 15.00     Pack years: 7.50     Types: Cigarettes    Smokeless tobacco: Never   Vaping Use    Vaping Use: Former   Substance and Sexual Activity    Alcohol use: Not Currently     Comment: drinks daily    Drug use: Not Currently     Types: Opiates , Cocaine     Comment: Hx of opiates, benzos, cocaine, alcohol abuse    Sexual activity: Yes     Partners: Female     Comment: Lives alone, not working. Other Topics Concern    Not on file   Social History Narrative    ** Merged History Encounter **          Social Determinants of Health     Financial Resource Strain: Medium Risk    Difficulty of Paying Living Expenses: Somewhat hard   Food Insecurity: No Food Insecurity    Worried About Running Out of Food in the Last Year: Never true    Ran Out of Food in the Last Year: Never true   Transportation Needs: No Transportation Needs    Lack of Transportation (Medical): No    Lack of Transportation (Non-Medical):  No   Physical Activity: Inactive    Days of Exercise per Week: 0 days    Minutes of Exercise per Session: 0 min   Stress: Stress Concern Present    Feeling of Stress : Rather much   Social Connections: Socially Isolated    Frequency of Communication with Friends and Family: Never    Frequency of Social Gatherings with Friends and Family: Never    Attends Yazidi Services: Never    Active Member of Clubs or Organizations: No    Attends Club or Organization Meetings: Never    Marital Status: Never    Intimate Partner Violence: Not At Risk    Fear of Current or Ex-Partner: No    Emotionally Abused: No    Physically Abused: No    Sexually Abused: No   Housing Stability: Low Risk     Unable to Pay for Housing in the Last Year: No    Number of [place] in the Last Year: 1    Unstable Housing in the Last Year: No       Family History   Problem Relation Age of Onset    Diabetes Father     Alcohol Abuse Father     Depression Father     Arthritis Father     High Blood Pressure Father     Other Father         aneurysm & epilepsy    Migraines Father     Arthritis Mother     Other Mother         aneurysm & epilepsy    Migraines Mother     Diabetes Brother         Aunt and uncles    Depression Brother     Mental Illness Brother     Other Brother         epilepsy    Migraines Brother     Stroke Other         Uncle    Other Brother         murdered Oct 6th, 2014    Colon Cancer Paternal Cousin 43    Other Sister         epilepsy    Migraines Sister        Allergies:  Dicyclomine, Famotidine, Geodon [ziprasidone hcl], Haloperidol, Iv dye [iodides], Reglan [metoclopramide], Ibuprofen, Aspirin, Dicyclomine hcl, Hydroxyzine hcl, Iodine, Metronidazole, Mirtazapine, Promethazine, and Ziprasidone    Home Medications:  Prior to Admission medications    Medication Sig Start Date End Date Taking?  Authorizing Provider   cloNIDine (CATAPRES) 0.1 MG tablet Take 1 tablet by mouth nightly for 3 days 7/14/22 7/17/22  MASSIEL Ham CNP   tiZANidine (ZANAFLEX) 4 MG tablet Take 1 tablet by mouth 2 times daily as needed (muscle spasm) 7/14/22   Jeane Lennox, APRN - CNP   Zinc Oxide 6 % CREA Apply 1 applicator topically in the morning and at bedtime 7/3/22   CHER Silvestre MD   ARIPiprazole (ABILIFY) 15 MG tablet Take 1 tablet by mouth daily 6/22/22   Di Lee MD   cetirizine (ZYRTEC) 10 MG tablet Take 1 tablet by mouth daily 6/22/22   Di Lee MD   meloxicam (MOBIC) 7.5 MG tablet Take 1 tablet by mouth 2 times daily as needed for Pain 8/19/21 10/30/21  MASSIEL Mason - CNP       REVIEW OF SYSTEMS    (2-9 systems for level 4, 10 or more for level 5)      Review of Systems   Constitutional:  Negative for chills and fever. HENT:  Negative for congestion and rhinorrhea. Eyes:  Negative for photophobia and visual disturbance. Respiratory:  Negative for shortness of breath and wheezing. Cardiovascular:  Negative for chest pain and palpitations. Gastrointestinal:  Negative for abdominal pain, nausea and vomiting. Genitourinary:  Negative for dysuria and frequency. Musculoskeletal:  Negative for back pain and positive for neck pain. Skin:  Negative for rash and wound. Neurological:  Negative for dizziness and headaches. PHYSICAL EXAM   (up to 7 for level 4, 8 or more for level 5)      INITIAL VITALS:   BP (!) 143/70   Pulse 87   Temp 98.3 °F (36.8 °C) (Oral)   Resp 11   Wt 210 lb (95.3 kg)   SpO2 95%   BMI 32.89 kg/m²     Physical Exam  Vitals and nursing note reviewed. Constitutional:       General: He is not in acute distress. HENT:      Head: Atraumatic. Right Ear: External ear normal.      Left Ear: External ear normal.      Nose: Nose normal.      Mouth/Throat:      Mouth: Mucous membranes are moist.      Pharynx: Oropharynx is clear. Eyes:      Conjunctiva/sclera: Conjunctivae normal.   Cardiovascular:      Rate and Rhythm: Normal rate and regular rhythm. Pulses: Normal pulses. Pulmonary:      Effort: Pulmonary effort is normal. No respiratory distress.       Breath sounds: Normal breath sounds. No wheezing. Abdominal:      Palpations: Abdomen is soft. Tenderness: There is no abdominal tenderness. Musculoskeletal:         General: Normal range of motion. Cervical back: In c-collar, midline tenderness     No T or L-spine tenderness  Skin:     General: Skin is warm and dry. Capillary Refill: Capillary refill takes less than 2 seconds. Neurological:      General: No focal deficit present. Mental Status: He is alert and oriented to person, place, and time.      DIFFERENTIAL  DIAGNOSIS     PLAN (LABS / IMAGING / EKG):  Orders Placed This Encounter   Procedures    XR CHEST PORTABLE    ELECTROLYTES PLUS    Hemoglobin and hematocrit, blood    CALCIUM, IONIC (POC)    CBC with Auto Differential    Basic Metabolic Panel w/ Reflex to MG    LAB SCANNED REPORT    Venous Blood Gas, POC    Creatinine W/GFR Point of Care    POCT urea (BUN)    Lactic Acid, POC    POCT Glucose    EKG 12 Lead       MEDICATIONS ORDERED:  Orders Placed This Encounter   Medications    lactated ringers bolus    diphenhydrAMINE (BENADRYL) injection 25 mg       DDX: Neck fracture, intracranial abnormality, intoxication, medication noncompliance    DIAGNOSTIC RESULTS / EMERGENCY DEPARTMENT COURSE / MDM   LAB RESULTS:  Results for orders placed or performed during the hospital encounter of 10/06/22   ELECTROLYTES PLUS   Result Value Ref Range    POC Sodium 141 138 - 146 mmol/L    POC Potassium 3.5 3.5 - 4.5 mmol/L    POC Chloride 104 98 - 107 mmol/L    POC TCO2 25 22 - 30 mmol/L    Anion Gap 13 7 - 16 mmol/L   Hemoglobin and hematocrit, blood   Result Value Ref Range    POC Hemoglobin 13.5 13.5 - 17.5 g/dL    POC Hematocrit 40 (L) 41 - 53 %   CALCIUM, IONIC (POC)   Result Value Ref Range    POC Ionized Calcium 1.23 1.15 - 1.33 mmol/L   CBC with Auto Differential   Result Value Ref Range    WBC 8.5 3.5 - 11.3 k/uL    RBC 4.27 4.21 - 5.77 m/uL    Hemoglobin 12.8 (L) 13.0 - 17.0 g/dL    Hematocrit 39.6 (L) 40.7 - 50.3 %    MCV 92.7 82.6 - 102.9 fL    MCH 30.0 25.2 - 33.5 pg    MCHC 32.3 28.4 - 34.8 g/dL    RDW 12.5 11.8 - 14.4 %    Platelets 331 463 - 988 k/uL    MPV 10.1 8.1 - 13.5 fL    NRBC Automated 0.0 0.0 per 100 WBC    Seg Neutrophils 60 36 - 65 %    Lymphocytes 30 24 - 43 %    Monocytes 8 3 - 12 %    Eosinophils % 1 1 - 4 %    Basophils 0 0 - 2 %    Immature Granulocytes 1 (H) 0 %    Segs Absolute 5.16 1.50 - 8.10 k/uL    Absolute Lymph # 2.51 1.10 - 3.70 k/uL    Absolute Mono # 0.65 0.10 - 1.20 k/uL    Absolute Eos # 0.10 0.00 - 0.44 k/uL    Basophils Absolute <0.03 0.00 - 0.20 k/uL    Absolute Immature Granulocyte 0.07 0.00 - 0.30 k/uL   Basic Metabolic Panel w/ Reflex to MG   Result Value Ref Range    Glucose 159 (H) 70 - 99 mg/dL    BUN 21 (H) 6 - 20 mg/dL    Creatinine 1.16 0.70 - 1.20 mg/dL    Est, Glom Filt Rate >60 >60 mL/min/1.73m2    Calcium 8.9 8.6 - 10.4 mg/dL    Sodium 139 135 - 144 mmol/L    Potassium 3.6 (L) 3.7 - 5.3 mmol/L    Chloride 99 98 - 107 mmol/L    CO2 23 20 - 31 mmol/L    Anion Gap 17 9 - 17 mmol/L   Venous Blood Gas, POC   Result Value Ref Range    pH, Rylan 7.222 (L) 7.320 - 7.430    pCO2, Rylan 58.2 (H) 41.0 - 51.0 mm Hg    pO2, Rylan 42.5 30.0 - 50.0 mm Hg    HCO3, Venous 23.9 22.0 - 29.0 mmol/L    Negative Base Excess, Rylan 5 (H) 0.0 - 2.0    O2 Sat, Rylan 67 60.0 - 85.0 %   Creatinine W/GFR Point of Care   Result Value Ref Range    POC Creatinine 1.22 (H) 0.51 - 1.19 mg/dL    GFR Comment >60 >60 mL/min    GFR Non-African American >60 >60 mL/min    eGFR, POC >60 mL/min/1.73m2   POCT urea (BUN)   Result Value Ref Range    POC BUN 20 8 - 26 mg/dL   Lactic Acid, POC   Result Value Ref Range    POC Lactic Acid 7.89 (H) 0.56 - 1.39 mmol/L   POCT Glucose   Result Value Ref Range    POC Glucose 159 (H) 74 - 100 mg/dL   EKG 12 Lead   Result Value Ref Range    Ventricular Rate 98 BPM    Atrial Rate 98 BPM    P-R Interval 142 ms    QRS Duration 88 ms    Q-T Interval 336 ms    QTc Calculation (Bazett) 428 ms    P Axis 37 degrees    R Axis 45 degrees    T Axis 27 degrees       IMPRESSION: Substance abuse responsive to Narcan    RADIOLOGY:  XR CHEST PORTABLE   Final Result   Shallow inflation with findings consistent with subsegmental atelectasis. EKG  Normal sinus rhythm, no ST or T wave changes    All EKG's are interpreted by the Emergency Department Physician who either signs or Co-signs this chart in the absence of a cardiologist.    EMERGENCY DEPARTMENT COURSE:  72-year-old male, history of bipolar disorder, substance abuse, presented to ED via paramedics with complaints of being found down and was given 4 mg of Narcan per paramedics with improvement of mentation. Patient denied any illegal drug use. Patient is alert and oriented on arrival and in c-collar due to neck pain after being found down on the grass. Patient notes he may have fallen from standing height to grass. No numbness, weakness, tingling. Moving all extremities. On exam, C-spine midline tenderness was noted, no SI or HI. Plan to get CT head and C-spine. Physical labs obtained. Slightly acidotic on arrival and patient was given fluids. Patient refused c-collar. Patient eloped prior to CT head or C-spine. ED Course as of 10/07/22 2221   Thu Oct 06, 2022   1209 POC Glucose(!): 159 [AR]   1209 Mentating appropriately, on 2 L nasal cannula and satting in low 90s. Plan to continue to monitor. [AR]   1302 Creatinine: 1.16 [AR]   1302 WBC: 8.5 [AR]   1302 Potassium(!): 3.6 [AR]      ED Course User Index  [AR] Nova Marks MD       No notes of EC Admission Criteria type on file. PROCEDURES:  None    CONSULTS:  None    FINAL IMPRESSION      1. Altered mental status, unspecified altered mental status type          DISPOSITION / PLAN     DISPOSITION Eloped - Left Before Treatment Complete 10/06/2022 03:43:52 PM      PATIENT REFERRED TO:  No follow-up provider specified.     DISCHARGE MEDICATIONS:  Discharge Medication List as of 10/6/2022  3:44 PM          Jasen Barry MD  Emergency Medicine Resident    (Please note that portions of thisnote were completed with a voice recognition program.  Efforts were made to edit the dictations but occasionally words are mis-transcribed.)         Chyna Ohara MD  Resident  10/07/22 [id number]

## 2022-10-06 NOTE — ED NOTES
Pt found wondering in the hallways. Pt redirected into his room where he states that he does not want the c collar on because it messes with his \"computer brain interface\" in his head. Pt told that there is no magnets in c collar and he is safe, pt still refusing c collar. Pt states he will have ct done if he can have benadryl before.       Millie Andino RN  10/06/22 0724

## 2022-10-06 NOTE — ED PROVIDER NOTES
[facility] HANDOFF       Handoff taken on the following patient from prior Attending Physician:  Pt Name: Adiel Ramirez  PCP:  No primary care provider on file. Attestation  I was available and discussed any additional care issues that arose and coordinated the management plans with the resident(s) caring for the patient during my duty period. Any areas of disagreement with resident's documentation of care or procedures are noted on the chart. I was personally present for the key portions of any/all procedures during my duty period. I have documented in the chart those procedures where I was not present during the key portions. CHIEF COMPLAINT       Chief Complaint   Patient presents with    Altered Mental Status     2mg Narcan IN, 2mg IV         CURRENT MEDICATIONS     Previous Medications  Discharge Medication List as of 10/6/2022  3:44 PM        CONTINUE these medications which have NOT CHANGED    Details   cloNIDine (CATAPRES) 0.1 MG tablet Take 1 tablet by mouth nightly for 3 days, Disp-3 tablet, R-0Print      tiZANidine (ZANAFLEX) 4 MG tablet Take 1 tablet by mouth 2 times daily as needed (muscle spasm), Disp-2 tablet, R-0Print      Zinc Oxide 6 % CREA Apply 1 applicator topically in the morning and at bedtime, Disp-114 g, R-0Print      ARIPiprazole (ABILIFY) 15 MG tablet Take 1 tablet by mouth daily, Disp-30 tablet, R-3Normal      cetirizine (ZYRTEC) 10 MG tablet Take 1 tablet by mouth daily, Disp-30 tablet, R-0Normal             Encounter Medications  Orders Placed This Encounter   Medications    lactated ringers bolus    diphenhydrAMINE (BENADRYL) injection 25 mg       ALLERGIES     is allergic to dicyclomine, famotidine, geodon [ziprasidone hcl], haloperidol, iv dye [iodides], reglan [metoclopramide], ibuprofen, aspirin, dicyclomine hcl, hydroxyzine hcl, iodine, metronidazole, mirtazapine, promethazine, and ziprasidone.       RECENT VITALS:   Temp: 98.3 °F (36.8 °C),  Heart Rate: 87, Resp: 11, BP: (!) 143/70    RADIOLOGY:   XR CHEST PORTABLE   Final Result   Shallow inflation with findings consistent with subsegmental atelectasis. LABS:  Labs Reviewed   HGB/HCT - Abnormal; Notable for the following components:       Result Value    POC Hematocrit 40 (*)     All other components within normal limits   CBC WITH AUTO DIFFERENTIAL - Abnormal; Notable for the following components:    Hemoglobin 12.8 (*)     Hematocrit 39.6 (*)     Immature Granulocytes 1 (*)     All other components within normal limits   BASIC METABOLIC PANEL W/ REFLEX TO MG FOR LOW K - Abnormal; Notable for the following components:    Glucose 159 (*)     BUN 21 (*)     Potassium 3.6 (*)     All other components within normal limits   VENOUS BLOOD GAS, POINT OF CARE - Abnormal; Notable for the following components:    pH, Rylan 7.222 (*)     pCO2, [place] 58.2 (*)     Negative Base Excess, Rylan 5 (*)     All other components within normal limits   CREATININE W/GFR POINT OF CARE - Abnormal; Notable for the following components:    POC Creatinine 1.22 (*)     All other components within normal limits   LACTIC ACID,POINT OF CARE - Abnormal; Notable for the following components:    POC Lactic Acid 7.89 (*)     All other components within normal limits   POCT GLUCOSE - Abnormal; Notable for the following components:    POC Glucose 159 (*)     All other components within normal limits   ELECTROLYTES PLUS   CALCIUM, IONIC (POC)   UREA N (POC)     Found altered on the street, possibly after a fall. Patient denies this. Elevated lactate, possible postictal after seizure. No concern for sepsis.     Patient eloped prior to completing evaluation      PLAN/ TASKS OUTSTANDING       CT brain, CT cervical, IV fluids, repeat lactate, reassess, disposition    (Please note that portions of this note were completed with a voice recognition program.  Efforts were made to edit the dictations but occasionally words are mis-transcribed. )    Victor Hugo Chris MD,, MD, F.A.C.E.P.   Attending Emergency Physician      Victor Hugo Chris MD  10/06/22 [id number]

## 2022-10-07 LAB
EKG ATRIAL RATE: 98 BPM
EKG P AXIS: 37 DEGREES
EKG P-R INTERVAL: 142 MS
EKG Q-T INTERVAL: 336 MS
EKG QRS DURATION: 88 MS
EKG QTC CALCULATION (BAZETT): 428 MS
EKG R AXIS: 45 DEGREES
EKG T AXIS: 27 DEGREES
EKG VENTRICULAR RATE: 98 BPM

## 2022-11-02 ENCOUNTER — HOSPITAL ENCOUNTER (EMERGENCY)
Age: 32
Discharge: HOME | End: 2022-11-03
Attending: EMERGENCY MEDICINE
Payer: MEDICARE

## 2022-11-02 VITALS
RESPIRATION RATE: 18 BRPM | DIASTOLIC BLOOD PRESSURE: 79 MMHG | SYSTOLIC BLOOD PRESSURE: 127 MMHG | WEIGHT: 210 LBS | TEMPERATURE: 98.3 F | HEART RATE: 95 BPM | BODY MASS INDEX: 32.96 KG/M2 | OXYGEN SATURATION: 100 % | HEIGHT: 67 IN

## 2022-11-02 DIAGNOSIS — L29.9 PRURITIC DISORDER: Primary | ICD-10-CM

## 2022-11-02 PROCEDURE — 96372 THER/PROPH/DIAG INJ SC/IM: CPT

## 2022-11-02 PROCEDURE — 6360000002 HC RX W HCPCS: Performed by: EMERGENCY MEDICINE

## 2022-11-02 PROCEDURE — 99284 EMERGENCY DEPT VISIT MOD MDM: CPT

## 2022-11-02 RX ORDER — ACETAMINOPHEN 500 MG
1000 TABLET ORAL ONCE
Status: DISCONTINUED | OUTPATIENT
Start: 2022-11-02 | End: 2022-11-03 | Stop reason: HOSPADM

## 2022-11-02 RX ORDER — DIPHENHYDRAMINE HYDROCHLORIDE 50 MG/ML
25 INJECTION INTRAMUSCULAR; INTRAVENOUS ONCE
Status: COMPLETED | OUTPATIENT
Start: 2022-11-02 | End: 2022-11-02

## 2022-11-02 RX ADMIN — DIPHENHYDRAMINE HYDROCHLORIDE 25 MG: 50 INJECTION, SOLUTION INTRAMUSCULAR; INTRAVENOUS at 23:57

## 2022-11-02 ASSESSMENT — PAIN SCALES - GENERAL: PAINLEVEL_OUTOF10: 10

## 2022-11-02 ASSESSMENT — PAIN - FUNCTIONAL ASSESSMENT: PAIN_FUNCTIONAL_ASSESSMENT: 0-10

## 2022-11-03 NOTE — ED NOTES
Pt refused vitals smacking students hand away when she attempted/      Lucia Easton RN  11/02/22 1752

## 2022-11-03 NOTE — DISCHARGE INSTRUCTIONS
Thank you for visiting [facility] Emergency Department. You need to call No primary care provider on file. to make an appointment as directed for follow up. Should you have any questions regarding your care or further treatment, please call [facility] Emergency Department at [phone]. Please return if you have any worsening pain, chest pain, shortness of breath, lightheadedness, dizziness, acute psychotic disorder, any concerns for her yourself or others, or any other concerns you may have.

## 2022-11-03 NOTE — ED PROVIDER NOTES
[facility] ED  Emergency Department Encounter  EmergencyMedicine Resident     Pt Aleshia Scott Vicky Garrett  MRN: [id number]  [place] 1990  Date of evaluation: 11/2/22  PCP:  No primary care provider on file. CHIEF COMPLAINT       Chief Complaint   Patient presents with    Toe Injury     feels like his toes are broken, but is walking around the ER triage        HISTORY OF PRESENT ILLNESS  (Location/Symptom, Timing/Onset, Context/Setting, Quality, Duration, Modifying Factors, Severity.)      Chao Morrison is a 28 y.o. male who presents with bilateral foot pain and swelling, itching all over body. Patient denies any traumatic specific injury. Patient was able to ambulate in with a steady gait. Patient well-known to the emergency department for multiple acute psychotic disorder. Patient noted to have some anxiety tonight, initial plan to give Atarax but he is noted to be allergic to Atarax. We gave Benadryl. Patient not cooperative, difficult to obtain vitals from, not answering questions frequently at times, patient poor historian. Patient denies suicidal ideation or homicidal ideation. Patient described pruritic itching all over the body to attending physician.     PAST MEDICAL / SURGICAL / SOCIAL / FAMILY HISTORY      has a past medical history of Anxiety, Arthritis, Asthma, Bipolar I disorder, most recent episode (or current) depressed, unspecified, Clostridium difficile infection, COPD (chronic obstructive pulmonary disease) ([address] Depression, Disease of blood and blood forming organ, Eczema, Fracture, metacarpal, Gastric ulcer, Gastritis, GERD (gastroesophageal reflux disease), GI bleed, H. pylori infection, H/O blood clots, Head injury, Headache, Insomnia, Juvenile rheumatoid arthritis ([address] Neuromuscular disorder ([address] PFAPA syndrome ([address] PUD (peptic ulcer disease), Rheumatoid arthritis ([address] Rheumatoid arthritis(714.0), Severe recurrent major depression without psychotic features ([facility] Sleep apnea, 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[facility] Substance abuse ([facility] Suicidal ideation, Suicide attempt by hanging ([facility] Tobacco dependence, Ulcerative colitis ([facility] and UTI (urinary tract infection). No additional pertinent     has a past surgical history that includes Colonoscopy; bronchoscopy; other surgical history; Upper gastrointestinal endoscopy (2/4/16); pr egd transoral biopsy single/multiple (N/A, 3/20/2017); sigmoidoscopy (N/A, 3/20/2017); Cholecystectomy, laparoscopic (07/14/2017); pr esophagogastroduodenoscopy transoral diagnostic (N/A, 8/9/2017); pr colonoscopy w/biopsy single/multiple (8/9/2017); Abdomen surgery; Upper gastrointestinal endoscopy (N/A, 6/13/2018); Upper gastrointestinal endoscopy (N/A, 9/20/2018); and Endoscopy, colon, diagnostic. No additional pertinent    Social History     Socioeconomic History    Marital status: Single     Spouse name: Not on file    Number of children: Not on file    Years of education: Not on file    Highest education level: Not on file   Occupational History    Occupation: disability   Tobacco Use    Smoking status: Every Day     Packs/day: 0.50     Years: 15.00     Pack years: 7.50     Types: Cigarettes    Smokeless tobacco: Never   Vaping Use    Vaping Use: Former   Substance and Sexual Activity    Alcohol use: Not Currently     Comment: drinks daily    Drug use: Not Currently     Types: Opiates , Cocaine     Comment: Hx of opiates, benzos, cocaine, alcohol abuse    Sexual activity: Yes     Partners: Female     Comment: Lives alone, not working.    Other Topics Concern    Not on file   Social History Narrative    ** Merged History Encounter **          Social Determinants of Health     Financial Resource Strain: Medium Risk    Difficulty of Paying Living Expenses: Somewhat hard   Food Insecurity: No Food Insecurity    Worried About Running Out of Food in the Last Year: Never true    Ran Out of Food in the Last Year: Never true   Transportation Needs: No Transportation Needs    Lack of Transportation (Medical): No    Lack of Transportation (Non-Medical): No   Physical Activity: Inactive    Days of Exercise per Week: 0 days    Minutes of Exercise per Session: 0 min   Stress: Stress Concern Present    Feeling of Stress : Rather much   Social Connections: Socially Isolated    Frequency of Communication with Friends and Family: Never    Frequency of Social Gatherings with Friends and Family: Never    Attends Holiness Services: Never    Active Member of Clubs or Organizations: No    Attends Club or Organization Meetings: Never    Marital Status: Never    Intimate Partner Violence: Not At Risk    Fear of Current or Ex-Partner: No    Emotionally Abused: No    Physically Abused: No    Sexually Abused: No   Housing Stability: Low Risk     Unable to Pay for Housing in the Last Year: No    Number of [place] in the Last Year: 1    Unstable Housing in the Last Year: No       Family History   Problem Relation Age of Onset    Diabetes Father     Alcohol Abuse Father     Depression Father     Arthritis Father     High Blood Pressure Father     Other Father         aneurysm & epilepsy    Migraines Father     Arthritis Mother     Other Mother         aneurysm & epilepsy    Migraines Mother     Diabetes Brother         Aunt and uncles    Depression Brother     Mental Illness Brother     Other Brother         epilepsy    Migraines Brother     Stroke Other         Uncle    Other Brother         murdered Oct 6th, 2014    Colon Cancer Paternal Cousin 43    Other Sister         epilepsy    Migraines Sister        Allergies:  Dicyclomine, Famotidine, Geodon [ziprasidone hcl], Haloperidol, Iv dye [iodides], Reglan [metoclopramide], Ibuprofen, Aspirin, Dicyclomine hcl, Hydroxyzine hcl, Iodine, Metronidazole, Mirtazapine, Promethazine, and Ziprasidone    Home Medications:  Prior to Admission medications    Medication Sig Start Date End Date Taking?  Authorizing Provider diphenhydrAMINE (BENADRYL) 25 MG capsule Take 1 capsule by mouth every 6 hours as needed for Itching 11/4/22 11/9/22  Eliane Crespo MD   cloNIDine (CATAPRES) 0.1 MG tablet Take 1 tablet by mouth nightly for 3 days 7/14/22 7/17/22  MASSIEL Zepeda CNP   tiZANidine (ZANAFLEX) 4 MG tablet Take 1 tablet by mouth 2 times daily as needed (muscle spasm) 7/14/22   MASSIEL Zepeda CNP   Zinc Oxide 6 % CREA Apply 1 applicator topically in the morning and at bedtime 7/3/22   CHER Caldwell MD   ARIPiprazole (ABILIFY) 15 MG tablet Take 1 tablet by mouth daily 6/22/22   Yana Dominguez MD   cetirizine (ZYRTEC) 10 MG tablet Take 1 tablet by mouth daily 6/22/22   Yana Dominguez MD   meloxicam (MOBIC) 7.5 MG tablet Take 1 tablet by mouth 2 times daily as needed for Pain 8/19/21 10/30/21  MASSIEL Rodríguez - CNP       REVIEW OF SYSTEMS    (2-9 systems for level 4, 10 or more for level 5)      Review of Systems   Constitutional:  Negative for chills and fever. Musculoskeletal:  Positive for arthralgias (Bilateral foot pain, nonspecific to location.) and gait problem (Patient able to weight-bear, ambulate with steady gait). Skin:  Negative for wound. PHYSICAL EXAM   (up to 7 for level 4, 8 or more for level 5)      INITIAL VITALS:   /79   Pulse 95   Temp 98.3 °F (36.8 °C) (Oral)   Resp 18   Ht 5' 7\" (1.702 m)   Wt 210 lb (95.3 kg)   SpO2 100%   BMI 32.89 kg/m²     Physical Exam  Constitutional:       Appearance: Normal appearance. HENT:      Head: Normocephalic and atraumatic. Mouth/Throat:      Mouth: Mucous membranes are moist.      Pharynx: Oropharynx is clear. Eyes:      Extraocular Movements: Extraocular movements intact. Conjunctiva/sclera: Conjunctivae normal.   Cardiovascular:      Rate and Rhythm: Normal rate and regular rhythm. Pulses: Normal pulses. Heart sounds: Normal heart sounds. No murmur heard.   Pulmonary:      Effort: Pulmonary effort is normal. Breath sounds: Normal breath sounds. Abdominal:      General: Bowel sounds are normal. There is no distension. Tenderness: There is no abdominal tenderness. There is no guarding. Musculoskeletal:         General: Tenderness (Tenderness when I touch any part of his foot, patient is able to move it through range of motion.) present. Normal range of motion. Comments: Range of motion noted to be normal with patient's natural movements   Skin:     General: Skin is warm and dry. Findings: No rash (On exposed skin). Neurological:      General: No focal deficit present. Mental Status: He is alert and oriented to person, place, and time. Psychiatric:         Mood and Affect: Mood normal.         Behavior: Behavior normal.       DIFFERENTIAL  DIAGNOSIS     PLAN (LABS / IMAGING / EKG):  No orders of the defined types were placed in this encounter. MEDICATIONS ORDERED:  Orders Placed This Encounter   Medications    diphenhydrAMINE (BENADRYL) injection 25 mg    DISCONTD: acetaminophen (TYLENOL) tablet 1,000 mg       DIAGNOSTIC RESULTS / EMERGENCY DEPARTMENT COURSE / MDM   LAB RESULTS:  No results found for this visit on 11/02/22. RADIOLOGY:  No orders to display        PROCEDURES:  none    CONSULTS:  None    MEDICAL DECISION MAKING:  Patient providing multiple nonspecific complaints of feet hurting, pruritic disorder, patient is noted to this emergency department to come in multiple times for malingering and multiple other complaints. Patient was able to ambulate with a steady gait. Patient was acting appropriately though with be a poor historian when asking a lot of questions about trying to be specific about his pain in his feet. Patient may experience neuropathy, but no noted specific injury to bilateral lower extremities that requires emergent intervention at this time. Patient also describing pruritus. Patient given Benadryl to help with the pruritus.   Per multiple people that have evaluated this patient in the emerge department for patient is at his baseline in multiple ways. No concern for other life-threatening injuries at this time. Patient noted of normal vital signs. Patient was discharged in stable condition. Patient instructed return for any worsening allergic reaction, worsening foot pain, strokelike symptoms or any other concerns the patient may have. CRITICAL CARE:  Please see attending note    FINAL IMPRESSION      1.  Pruritic disorder        DISPOSITION / PLAN     DISPOSITION Decision To Discharge 11/03/2022 12:08:24 AM      PATIENT REFERRED TO:  [address] [place] [id number] [phone]  Schedule an appointment as soon as possible for a visit today  For general primary care needs    DISCHARGE MEDICATIONS:  Discharge Medication List as of 11/3/2022 12:09 AM          Jany Cruz DO  Emergency Medicine Resident    (Please note that portions of thisnote were completed with a voice recognition program.  Efforts were made to edit the dictations but occasionally words are mis-transcribed.)       Jany Cruz DO  Resident  11/04/22 [id number]

## 2022-11-03 NOTE — ED PROVIDER NOTES
Faculty Sign-Out Attestation  Handoff taken on the following patient from prior Attending Physician: Elijah Lynn    I was available and discussed any additional care issues that arose and coordinated the management plans with the resident(s) caring for the patient during my duty period. Any areas of disagreement with residents documentation of care or procedures are noted on the chart. I was personally present for the key portions of any/all procedures during my duty period. I have documented in the chart those procedures where I was not present during the key portions.     Foot complaint, getting benadryl & socks will be discharged    Byron Verma DO  Attending Physician       Byron Verma DO  11/03/22 0006

## 2022-11-03 NOTE — ED PROVIDER NOTES
[facility] ED  eMERGENCY dEPARTMENT eNCOUnter   Attending Attestation     Pt Name: Shanthi Jamison  MRN: [id number]  [place] 1990  Date of evaluation: 11/3/22       Shanthi Jamison is a 28 y.o. male who presents with Toe Injury (feels like his toes are broken, but is walking around the ER triage )      History: Patient presents with bilateral foot pain as well as feeling like he is anxious. Patient requesting Benadryl and Xanax. Exam: Heart rate and rhythm are regular. Lungs are clear to auscultation bilaterally. Abdomen is soft, nontender. Patient is awake alert and acting appropriately for himself. Patient has bilateral foot tenderness however no signs of trauma. Plan for clean socks for the patient, Benadryl, discharge. I performed a history and physical examination of the patient and discussed management with the resident. I reviewed the residents note and agree with the documented findings and plan of care. Any areas of disagreement are noted on the chart. I was personally present for the key portions of any procedures. I have documented in the chart those procedures where I was not present during the key portions. I have personally reviewed all images and agree with the resident's interpretation. I have reviewed the emergency nurses triage note. I agree with the chief complaint, past medical history, past surgical history, allergies, medications, social and family history as documented unless otherwise noted below. Documentation of the HPI, Physical Exam and Medical Decision Making performed by medical students or scribes is based on my personal performance of the HPI, PE and MDM. For Phys Assistant/ Nurse Practitioner cases/documentation I have had a face to face evaluation of this patient and have completed at least one if not all key elements of the E/M (history, physical exam, and MDM). Additional findings are as noted.     For APC cases I have personally evaluated and examined the patient in conjunction with the APC and agree with the treatment plan and disposition of the patient as recorded by the APC.     Bobbi Johnson MD  Attending Emergency  Physician        Blayne Maldonado MD  11/03/22 Jhon Dickson

## 2022-11-04 ENCOUNTER — HOSPITAL ENCOUNTER (EMERGENCY)
Age: 32
Discharge: HOME | End: 2022-11-04
Attending: EMERGENCY MEDICINE
Payer: MEDICARE

## 2022-11-04 VITALS
OXYGEN SATURATION: 97 % | HEIGHT: 67 IN | RESPIRATION RATE: 18 BRPM | WEIGHT: 210 LBS | BODY MASS INDEX: 32.96 KG/M2 | DIASTOLIC BLOOD PRESSURE: 74 MMHG | HEART RATE: 94 BPM | TEMPERATURE: 98.3 F | SYSTOLIC BLOOD PRESSURE: 134 MMHG

## 2022-11-04 DIAGNOSIS — M79.672 PAIN IN BOTH FEET: Primary | ICD-10-CM

## 2022-11-04 DIAGNOSIS — M79.671 PAIN IN BOTH FEET: Primary | ICD-10-CM

## 2022-11-04 PROCEDURE — 99284 EMERGENCY DEPT VISIT MOD MDM: CPT

## 2022-11-04 PROCEDURE — 6360000002 HC RX W HCPCS: Performed by: EMERGENCY MEDICINE

## 2022-11-04 PROCEDURE — 96372 THER/PROPH/DIAG INJ SC/IM: CPT

## 2022-11-04 RX ORDER — DIPHENHYDRAMINE HCL 25 MG
25 CAPSULE ORAL EVERY 6 HOURS PRN
Qty: 30 CAPSULE | Refills: 0 | Status: SHIPPED | OUTPATIENT
Start: 2022-11-04 | End: 2022-11-09

## 2022-11-04 RX ORDER — DIPHENHYDRAMINE HYDROCHLORIDE 50 MG/ML
25 INJECTION INTRAMUSCULAR; INTRAVENOUS ONCE
Status: COMPLETED | OUTPATIENT
Start: 2022-11-04 | End: 2022-11-04

## 2022-11-04 RX ADMIN — DIPHENHYDRAMINE HYDROCHLORIDE 25 MG: 50 INJECTION, SOLUTION INTRAMUSCULAR; INTRAVENOUS at 10:44

## 2022-11-04 ASSESSMENT — LIFESTYLE VARIABLES
HOW MANY STANDARD DRINKS CONTAINING ALCOHOL DO YOU HAVE ON A TYPICAL DAY: PATIENT DOES NOT DRINK
HOW OFTEN DO YOU HAVE A DRINK CONTAINING ALCOHOL: NEVER

## 2022-11-04 NOTE — ED PROVIDER NOTES
EMERGENCY DEPARTMENT ENCOUNTER    Pt Name: Judith Patel  MRN: [id number]  [place] 1990  Date of evaluation: 11/4/22  CHIEF COMPLAINT       Chief Complaint   Patient presents with    Foot Pain     HISTORY OF PRESENT ILLNESS   HPI  Both feet painful, asking for xanax and benadryl  He walks a lot  Has chronic pain in feet  No trauma   No fever  No other complaints      REVIEW OF SYSTEMS     Review of Systems   All other systems reviewed and are negative.   PASTMEDICAL HISTORY     Past Medical History:   Diagnosis Date    Anxiety     Arthritis     Asthma     Bipolar I disorder, most recent episode (or current) depressed, unspecified 9/12/2014    Clostridium difficile infection     COPD (chronic obstructive pulmonary disease) ([address]     Depression     Disease of blood and blood forming organ     Eczema     Fracture, metacarpal     R 4th and 5th    Gastric ulcer     Gastritis 06/13/2018    GERD (gastroesophageal reflux disease)     GI bleed     H. 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[address]     Substance abuse ([address]     Hx of opiates, benzos, cocaine, alcohol abuse    Suicidal ideation     Suicide attempt by hanging ([address]     Tobacco dependence     Ulcerative colitis ([address]     UTI (urinary tract infection)      Past Problem List  Patient Active Problem List   Diagnosis Code    Opioid abuse ([address] F11.10    Noncompliance Z91.199    Rectal bleeding K62.5    Smoker F17.200    Juvenile rheumatoid arthritis ([address] M08.00    SRIRAM (obstructive sleep apnea) G47.33    Primary insomnia F51.01    Calculus of bile duct with acute on chronic cholecystitis K80.46    Mild intermittent asthma without complication Q03.05    Gastritis K29.70    Generalized abdominal pain R10.84    Anemia D64.9    Elevated liver enzymes R74.8    Nausea and vomiting R11.2    Opioid type dependence, continuous ([facility]) F11.20    Abdominal pain R10.9    Diarrhea R19.7    Irritable bowel syndrome with both constipation and diarrhea K58.2    Schizoaffective disorder, bipolar type ([address] F25.0    GI bleed K92.2    Depression with suicidal ideation F32. A, R45.851    Schizoaffective disorder ([facility]) F25.9    MDD (major depressive disorder), recurrent severe, without psychosis ([address] F33.2    Severe episode of recurrent major depressive disorder, without psychotic features ([address] F33.2    Diabetes mellitus type 2 in obese ([address] E11.69, E66.9    Mixed hyperlipidemia E78.2    Cocaine abuse ([address] F14.10    Acute psychosis ([address] F23    PUD (peptic ulcer disease) K27.9    Gastroesophageal reflux disease without esophagitis K21.9    Prediabetes R73.03     SURGICAL HISTORY       Past Surgical History:   Procedure Laterality Date    ABDOMEN SURGERY      upper GI scope 7/7/2015    BRONCHOSCOPY      CHOLECYSTECTOMY, LAPAROSCOPIC  07/14/2017    surgery performed at [facility], COLON, DIAGNOSTIC      OTHER SURGICAL HISTORY      lumbar puncture    [place] COLONOSCOPY W/BIOPSY SINGLE/MULTIPLE  8/9/2017    COLONOSCOPY WITH BIOPSY performed by Carrie Ibrahim MD at [facility]    [place] EGD TRANSORAL BIOPSY SINGLE/MULTIPLE N/A 3/20/2017    EGD BIOPSY performed by Raquel Suárez MD at [facility]    TN ESOPHAGOGASTRODUODENOSCOPY TRANSORAL DIAGNOSTIC N/A 8/9/2017    EGD ESOPHAGOGASTRODUODENOSCOPY performed by Carrie Ibrahim MD at [address] [place] [address] N/A 3/20/2017    SIGMOIDOSCOPY DIAGNOSTIC FLEXIBLE performed by Raquel Suárez MD at [place] [address]  2/4/16    UPPER GASTROINTESTINAL ENDOSCOPY N/A 6/13/2018    GASTRITIS    UPPER GASTROINTESTINAL ENDOSCOPY N/A 9/20/2018    EGD BIOPSY performed by Rock Good MD at [address] [place] [address]       Discharge Medication List as of 11/4/2022 10:40 AM        CONTINUE these medications which have NOT CHANGED    Details   cloNIDine (CATAPRES) 0.1 MG tablet Take 1 tablet by mouth nightly for 3 days, Disp-3 tablet, R-0Print      tiZANidine (ZANAFLEX) 4 MG tablet Take 1 tablet by mouth 2 times daily as needed (muscle spasm), Disp-2 tablet, R-0Print      Zinc Oxide 6 % CREA Apply 1 applicator topically in the morning and at bedtime, Disp-114 g, R-0Print      ARIPiprazole (ABILIFY) 15 MG tablet Take 1 tablet by mouth daily, Disp-30 tablet, R-3Normal      cetirizine (ZYRTEC) 10 MG tablet Take 1 tablet by mouth daily, Disp-30 tablet, R-0Normal           ALLERGIES     is allergic to dicyclomine, famotidine, geodon [ziprasidone hcl], haloperidol, iv dye [iodides], reglan [metoclopramide], ibuprofen, aspirin, dicyclomine hcl, hydroxyzine hcl, iodine, metronidazole, mirtazapine, promethazine, and ziprasidone. FAMILY HISTORY     He indicated that his mother is alive. He indicated that his father is alive. He indicated that the status of his sister is unknown. He indicated that the status of his other is unknown. He indicated that the status of his paternal cousin is unknown.      SOCIAL HISTORY       Social History     Tobacco Use    Smoking status: Every Day     Packs/day: 0.50     Years: 15.00     Pack years: 7.50     Types: Cigarettes    Smokeless tobacco: Never   Vaping Use    Vaping Use: Former   Substance Use Topics    Alcohol use: Not Currently     Comment: drinks daily    Drug use: Not Currently     Types: Opiates , Cocaine     Comment: Hx of opiates, benzos, cocaine, alcohol abuse     PHYSICAL EXAM     INITIAL VITALS: /74   Pulse 94   Temp 98.3 °F (36.8 °C) (Oral)   Resp 18   Ht 5' 7\" (1.702 m)   Wt 210 lb (95.3 kg)   SpO2 97%   BMI 32.89 kg/m²    Physical Exam  Constitutional:       General: He is not in acute distress. Appearance: Normal appearance. He is well-developed. He is not diaphoretic. HENT:      Head: Normocephalic and atraumatic. Right Ear: External ear normal.      Left Ear: External ear normal.      Nose: Nose normal. No congestion. Mouth/Throat:      Mouth: Mucous membranes are moist.      Pharynx: Oropharynx is clear. Eyes:      General:         Right eye: No discharge. Left eye: No discharge. Conjunctiva/sclera: Conjunctivae normal.      Pupils: Pupils are equal, round, and reactive to light. Neck:      Trachea: No tracheal deviation. Cardiovascular:      Rate and Rhythm: Normal rate and regular rhythm. Pulses: Normal pulses. Heart sounds: Normal heart sounds. Pulmonary:      Effort: Pulmonary effort is normal. No respiratory distress. Breath sounds: Normal breath sounds. No stridor. No wheezing or rales. Abdominal:      Palpations: Abdomen is soft. Tenderness: There is no abdominal tenderness. There is no guarding or rebound. Musculoskeletal:         General: No tenderness or deformity. Normal range of motion. Cervical back: Normal range of motion and neck supple. Skin:     General: Skin is warm and dry. Capillary Refill: Capillary refill takes less than 2 seconds. feet     Findings: No erythema or rash. Neurological:      General: No focal deficit present. Mental Status: He is alert and oriented to person, place, and time. Coordination: Coordination normal.      Comments: GCS 15  Strength in arms 5/5  Strength in legs 5/5  Sensation intact bl arms and legs  No facial droop  Speech is clear, no dysarthria or aphasia  No ataxia in arms or legs  ambulatory       Psychiatric:         Mood and Affect: Mood normal.         Behavior: Behavior normal.         Thought Content:  Thought content normal.         Judgment: Judgment normal.      Comments: No si or hi, calm, cooperative, no hallucinations       MEDICAL DECISION MAKING:   Suspect chronic neuropathy  He is asking for xanax and premethazine and benadryl  Discussed with patient anticipatory guidance, discharge instructions, follow up PCP 24 hours           Procedures    DIAGNOSTIC RESULTS     EMERGENCY DEPARTMENTCOURSE:         Vitals:    Vitals:    11/04/22 0950   BP: 134/74   Pulse: 94   Resp: 18   Temp: 98.3 °F (36.8 °C)   TempSrc: Oral   SpO2: 97%   Weight: 210 lb (95.3 kg)   Height: 5' 7\" (1.702 m)       The patient was given the following medications while in the emergency department:  Orders Placed This Encounter   Medications    diphenhydrAMINE (BENADRYL) injection 25 mg    diphenhydrAMINE (BENADRYL) 25 MG capsule     Sig: Take 1 capsule by mouth every 6 hours as needed for Itching     Dispense:  30 capsule     Refill:  0     FINAL IMPRESSION      1. Pain in both feet          DISPOSITION/PLAN   DISPOSITION Decision To Discharge 11/04/2022 10:35:08 AM      PATIENT REFERRED TO:  [place] [address]  [phone]    Schedule an appointment as soon as possible for a visit in 1 day    DISCHARGE MEDICATIONS:  Discharge Medication List as of 11/4/2022 10:40 AM        START taking these medications    Details   diphenhydrAMINE (BENADRYL) 25 MG capsule Take 1 capsule by mouth every 6 hours as needed for Itching, Disp-30 capsule, R-0Normal           The care is provided during an unprecedented national emergency due to the novel coronavirus, COVID 19.   MD Nilda Hernández MD  11/04/22 4566

## 2022-11-04 NOTE — ED NOTES
Spoke with Dr. Jayson Romero regarding physician note flagged to [facility] orthodox antihistamines\". Per dr. Jayson Romero ok to give IM benadryl at this time.       Carlos Guevara RN  11/04/22 1048

## 2022-11-04 NOTE — ED NOTES
Patient has multiple IM shot sticks and bandaids on bilateral upper extremities.  Given AVS and informed patient he is up for discharge at this time     Christopher Massey RN  11/04/22 4306

## 2022-11-04 NOTE — ED NOTES
Patient seen by multiple emergency departments already this morning for presented symptoms     Jia Mack RN  11/04/22 3420

## 2022-11-06 ENCOUNTER — HOSPITAL ENCOUNTER (EMERGENCY)
Age: 32
Discharge: LEFT BEFORE DISCHARGE | End: 2022-11-06
Attending: STUDENT IN AN ORGANIZED HEALTH CARE EDUCATION/TRAINING PROGRAM

## 2022-11-06 NOTE — ED NOTES
Pt called writer to bedside. Stated he was tired and wants to go home and go to sleep. Pt told writer he was going home to go to bed.       Javier Tatum RN  11/06/22 1581

## 2022-11-07 ENCOUNTER — HOSPITAL ENCOUNTER (EMERGENCY)
Age: 32
Discharge: HOME | End: 2022-11-07
Attending: STUDENT IN AN ORGANIZED HEALTH CARE EDUCATION/TRAINING PROGRAM
Payer: MEDICARE

## 2022-11-07 VITALS
SYSTOLIC BLOOD PRESSURE: 137 MMHG | HEIGHT: 67 IN | DIASTOLIC BLOOD PRESSURE: 74 MMHG | RESPIRATION RATE: 18 BRPM | OXYGEN SATURATION: 98 % | WEIGHT: 210 LBS | BODY MASS INDEX: 32.96 KG/M2 | HEART RATE: 84 BPM | TEMPERATURE: 98.2 F

## 2022-11-07 DIAGNOSIS — F41.1 ANXIETY STATE: Primary | ICD-10-CM

## 2022-11-07 PROCEDURE — 99284 EMERGENCY DEPT VISIT MOD MDM: CPT

## 2022-11-07 PROCEDURE — 96372 THER/PROPH/DIAG INJ SC/IM: CPT

## 2022-11-07 PROCEDURE — 6360000002 HC RX W HCPCS: Performed by: STUDENT IN AN ORGANIZED HEALTH CARE EDUCATION/TRAINING PROGRAM

## 2022-11-07 RX ORDER — DIPHENHYDRAMINE HYDROCHLORIDE 50 MG/ML
25 INJECTION INTRAMUSCULAR; INTRAVENOUS ONCE
Status: DISCONTINUED | OUTPATIENT
Start: 2022-11-07 | End: 2022-11-07

## 2022-11-07 RX ORDER — DIPHENHYDRAMINE HYDROCHLORIDE 50 MG/ML
25 INJECTION INTRAMUSCULAR; INTRAVENOUS ONCE
Status: COMPLETED | OUTPATIENT
Start: 2022-11-07 | End: 2022-11-07

## 2022-11-07 RX ORDER — DIPHENHYDRAMINE HCL 25 MG
25 TABLET ORAL ONCE
Status: DISCONTINUED | OUTPATIENT
Start: 2022-11-07 | End: 2022-11-07

## 2022-11-07 RX ADMIN — DIPHENHYDRAMINE HYDROCHLORIDE 25 MG: 50 INJECTION, SOLUTION INTRAMUSCULAR; INTRAVENOUS at 02:14

## 2022-11-07 ASSESSMENT — ENCOUNTER SYMPTOMS
RHINORRHEA: 0
EYE DISCHARGE: 0
SORE THROAT: 0
SHORTNESS OF BREATH: 0
ABDOMINAL PAIN: 0
EYE REDNESS: 0
NAUSEA: 0
VOMITING: 0
CHEST TIGHTNESS: 0
DIARRHEA: 0

## 2022-11-07 ASSESSMENT — PAIN SCALES - GENERAL: PAINLEVEL_OUTOF10: 5

## 2022-11-07 ASSESSMENT — PAIN - FUNCTIONAL ASSESSMENT: PAIN_FUNCTIONAL_ASSESSMENT: 0-10

## 2022-11-07 NOTE — ED NOTES
Pt changed his mind multiple times on whether or not he wanted the benadryl injection upon administration. First dose was drawn up and just before injection pt changed his mind. Pt 5 minutes later stated he wanted a po dose and then changed his mind again to IM. Upon administration as writer was injecting the dose, pt grabbed the needle and started pulling it out. Urban Cough from security present. Pt then stated he just wanted to go and left with his discharge instructions.       Anika Knight RN  11/07/22 2044

## 2022-11-07 NOTE — ED TRIAGE NOTES
Mode of arrival (squad #, walk in, police, etc) : walk-in    Chief complaint(s): anxiety / foot pain    Arrival Note (brief scenario, treatment PTA, etc). : Pt arrives to ED c/o anxiety and left foot pain. C= \"Have you ever felt that you should Cut down on your drinking? \"  No  A= \"Have people Annoyed you by criticizing your drinking? \"  No  G= \"Have you ever felt bad or Guilty about your drinking? \"  No  E= \"Have you ever had a drink as an Eye-opener first thing in the morning to steady your nerves or to help a hangover? \"  No      Deferred []      Reason for deferring: N/A    *If yes to two or more: probable alcohol abuse. *

## 2022-11-07 NOTE — ED PROVIDER NOTES
EMERGENCY DEPARTMENT ENCOUNTER    Pt Name: Brianne Johansen  MRN: [id number]  [place] 1990  Date of evaluation: 11/7/22  CHIEF COMPLAINT       Chief Complaint   Patient presents with    Anxiety    Foot Pain     HISTORY OF PRESENT ILLNESS   77-year-old history of schizoaffective and homelessness    He has very frequent presentations with very similar complaints    He states he feels anxious    He is requesting a prescription for Xanax. He denies any suicidal or homicidal ideation. No auditory visual hallucinations. No other complaints at this time    He states he would just like Xanax so he can sleep                REVIEW OF SYSTEMS     Review of Systems   Constitutional:  Negative for chills and fever. HENT:  Negative for rhinorrhea and sore throat. Eyes:  Negative for discharge and redness. Respiratory:  Negative for chest tightness and shortness of breath. Cardiovascular:  Negative for chest pain. Gastrointestinal:  Negative for abdominal pain, diarrhea, nausea and vomiting. Genitourinary:  Negative for dysuria and frequency. Musculoskeletal:  Negative for arthralgias and myalgias. Skin:  Negative for rash. Neurological:  Negative for weakness and numbness. Psychiatric/Behavioral:  Negative for suicidal ideas. The patient is nervous/anxious. All other systems reviewed and are negative.   PASTMEDICAL HISTORY     Past Medical History:   Diagnosis Date    Anxiety     Arthritis     Asthma     Bipolar I disorder, most recent episode (or current) depressed, unspecified 9/12/2014    Clostridium difficile infection     COPD (chronic obstructive pulmonary disease) ([facility]     Depression     Disease of blood and blood forming organ     Eczema     Fracture, metacarpal     R 4th and 5th    Gastric ulcer     Gastritis 06/13/2018    GERD (gastroesophageal reflux disease)     GI bleed     H. 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[facility]     Substance abuse ([facility]     Hx of opiates, benzos, cocaine, alcohol abuse    Suicidal ideation     Suicide attempt by hanging ([facility]     Tobacco dependence     Ulcerative colitis ([facility]     UTI (urinary tract infection)      Past Problem List  Patient Active Problem List   Diagnosis Code    Opio[facility] F11.10    Noncompliance Z91.199    Rectal bleeding K62.5    Smoker F17.200    Juvenile rheumatoid arthritis ([facility] M08.00    SRIRAM (obstructive sleep apnea) G47.33    Primary insomnia F51.01    Calculus of bile duct with acute on chronic cholecystitis K80.46    Mild intermittent asthma without complication S73.81    Gastritis K29.70    Generalized abdominal pain R10.84    Anemia D64.9    Elevated liver enzymes R74.8    Nausea and vomiting R11.2    Opioid type dependence, continuous ([facility]) F11.20    Abdominal pain R10.9    Diarrhea R19.7    Irritable bowel syndrome with both constipation and diarrhea K58.2    Schizoaffective disorder, bipolar type ([facility] F25.0    GI bleed K92.2    Depression with suicidal ideation F32. A, R45.851    Schizoaffective disorder ([facility] F25.9    MDD (major depressive disorder), recurrent severe, without psychosis ([facility] F33.2    Severe episode of recurrent major depressive disorder, without psychotic features ([facility] F33.2    Diabetes mellitus type 2 in obese ([facility] E11.69, E66.9    Mixed hyperlipidemia E78.2    Cocaine abuse ([facility] F14.10    Acute psychosis ([facility] F23    PUD (peptic ulcer disease) K27.9    Gastroesophageal reflux disease without esophagitis K21.9    Prediabetes R73.03     SURGICAL HISTORY       Past Surgical History:   Procedure Laterality Date    ABDOMEN SURGERY      upper GI scope 7/7/2015 BRONCHOSCOPY      CHOLECYSTECTOMY, LAPAROSCOPIC  07/14/2017    surgery performed at [facility], COLON, DIAGNOSTIC      OTHER SURGICAL HISTORY      lumbar puncture    UT COLONOSCOPY W/BIOPSY SINGLE/MULTIPLE  8/9/2017    COLONOSCOPY WITH BIOPSY performed by Eric Urbina MD at [facility] EGD TRANSORAL BIOPSY SINGLE/MULTIPLE N/A 3/20/2017    EGD BIOPSY performed by Marlin Banerjee MD at \[facility]"" Endoscopy    UT ESOPHAGOGASTRODUODENOSCOPY TRANSORAL DIAGNOSTIC N/A 8/9/2017    EGD ESOPHAGOGASTRODUODENOSCOPY performed by Eric Urbina MD at [facility] N/A 3/20/2017    [facility] performed by Marlin Banerjee MD at [place] [address]  2/4/16    UPPER GASTROINTESTINAL ENDOSCOPY N/A 6/13/2018    GASTRITIS    UPPER GASTROINTESTINAL ENDOSCOPY N/A 9/20/2018    EGD BIOPSY performed by Drea Gallegos MD at [address] [place] [address]       Previous Medications    ARIPIPRAZOLE (ABILIFY) 15 MG TABLET    Take 1 tablet by mouth daily    CETIRIZINE (ZYRTEC) 10 MG TABLET    Take 1 tablet by mouth daily    CLONIDINE (CATAPRES) 0.1 MG TABLET    Take 1 tablet by mouth nightly for 3 days    DIPHENHYDRAMINE (BENADRYL) 25 MG CAPSULE    Take 1 capsule by mouth every 6 hours as needed for Itching    TIZANIDINE (ZANAFLEX) 4 MG TABLET    Take 1 tablet by mouth 2 times daily as needed (muscle spasm)    ZINC OXIDE 6 % CREA    Apply 1 applicator topically in the morning and at bedtime     ALLERGIES     is allergic to dicyclomine, famotidine, geodon [ziprasidone hcl], haloperidol, iv dye [iodides], reglan [metoclopramide], ibuprofen, aspirin, dicyclomine hcl, hydroxyzine hcl, iodine, metronidazole, mirtazapine, promethazine, and ziprasidone. FAMILY HISTORY     He indicated that his mother is alive. He indicated that his father is alive. He indicated that the status of his sister is unknown.  He indicated that the status of his other is unknown. He indicated that the status of his paternal cousin is unknown. SOCIAL HISTORY       Social History     Tobacco Use    Smoking status: Every Day     Packs/day: 0.50     Years: 15.00     Pack years: 7.50     Types: Cigarettes    Smokeless tobacco: Never   Vaping Use    Vaping Use: Former   Substance Use Topics    Alcohol use: Not Currently     Comment: drinks daily    Drug use: Not Currently     Types: Opiates , Cocaine     Comment: Hx of opiates, benzos, cocaine, alcohol abuse     PHYSICAL EXAM     INITIAL VITALS: /74   Pulse 84   Temp 98.2 °F (36.8 °C) (Oral)   Resp 18   Ht 5' 7\" (1.702 m)   Wt 210 lb (95.3 kg)   SpO2 98%   BMI 32.89 kg/m²    Physical Exam  Vitals and nursing note reviewed. Constitutional:       Appearance: Normal appearance. HENT:      Head: Normocephalic and atraumatic. Nose: Nose normal.      Mouth/Throat:      Mouth: Mucous membranes are moist.   Eyes:      Conjunctiva/sclera: Conjunctivae normal.      Pupils: Pupils are equal, round, and reactive to light. Cardiovascular:      Rate and Rhythm: Normal rate and regular rhythm. Pulses: Normal pulses. Heart sounds: Normal heart sounds. Pulmonary:      Effort: Pulmonary effort is normal.      Breath sounds: Normal breath sounds. Abdominal:      Palpations: Abdomen is soft. Tenderness: There is no abdominal tenderness. Musculoskeletal:         General: No swelling or deformity. Cervical back: Normal range of motion. Skin:     General: Skin is warm. Findings: No rash. Neurological:      General: No focal deficit present. Mental Status: He is alert and oriented to person, place, and time.    Psychiatric:         Mood and Affect: Mood normal.       MEDICAL DECISION MAKIN-year-old presents requesting prescription for Xanax for anxiety    Upon chart review patient has multiple presentations at multiple hospitals in the area with similar complaints    I have explained to him at length that as an emergency physician I cannot prescribe Xanax and he must follow-up with outpatient psychiatry to manage chronic benzodiazepine prescriptions    I will give him Benadryl here and discharged home    He states that \"the pill does not work for him\" we will do 25 mg of IM Benadryl       CRITICAL CARE:       PROCEDURES:    Procedures    DIAGNOSTIC RESULTS   EKG:All EKG's are interpreted by the Emergency Department Physician who either signs or Co-signs this chart in the absence of a cardiologist.        RADIOLOGY:All plain film, CT, MRI, and formal ultrasound images (except ED bedside ultrasound) are read by the radiologist, see reports below, unless otherwisenoted in MDM or here. No orders to display     LABS: All lab results were reviewed by myself, and all abnormals are listed below. Labs Reviewed - No data to display    EMERGENCY DEPARTMENTCOURSE:         Vitals:    Vitals:    11/07/22 0123   BP: 137/74   Pulse: 84   Resp: 18   Temp: 98.2 °F (36.8 °C)   TempSrc: Oral   SpO2: 98%   Weight: 210 lb (95.3 kg)   Height: 5' 7\" (1.702 m)       The patient was given the following medications while in the emergency department:  Orders Placed This Encounter   Medications    diphenhydrAMINE (BENADRYL) injection 25 mg     CONSULTS:  None    FINAL IMPRESSION      1. Anxiety state          DISPOSITION/PLAN   DISPOSITION Decision To Discharge 11/07/2022 01:35:50 AM      PATIENT REFERRED TO:  [address] [place] [id number]  [phone]        [facility] ED  [address]  [phone]    As needed  DISCHARGE MEDICATIONS:  New Prescriptions    No medications on file     The care is provided during an unprecedented national emergency due to the novel coronavirus, COVID 19.   MD Govind Mittal MD  11/07/22 2949

## 2022-11-08 ENCOUNTER — HOSPITAL ENCOUNTER (EMERGENCY)
Age: 32
Discharge: HOME | End: 2022-11-08
Attending: EMERGENCY MEDICINE
Payer: MEDICARE

## 2022-11-08 VITALS
BODY MASS INDEX: 32.89 KG/M2 | HEART RATE: 117 BPM | RESPIRATION RATE: 15 BRPM | WEIGHT: 210 LBS | TEMPERATURE: 98.2 F | OXYGEN SATURATION: 97 % | DIASTOLIC BLOOD PRESSURE: 75 MMHG | SYSTOLIC BLOOD PRESSURE: 125 MMHG

## 2022-11-08 DIAGNOSIS — T07.XXXA ABRASIONS OF MULTIPLE SITES: Primary | ICD-10-CM

## 2022-11-08 PROCEDURE — 99282 EMERGENCY DEPT VISIT SF MDM: CPT

## 2022-11-08 RX ORDER — DIPHENHYDRAMINE HCL 25 MG
50 TABLET ORAL ONCE
Status: DISCONTINUED | OUTPATIENT
Start: 2022-11-08 | End: 2022-11-08 | Stop reason: HOSPADM

## 2022-11-08 RX ORDER — DIPHENHYDRAMINE HYDROCHLORIDE 50 MG/ML
50 INJECTION INTRAMUSCULAR; INTRAVENOUS ONCE
Status: DISCONTINUED | OUTPATIENT
Start: 2022-11-08 | End: 2022-11-08

## 2022-11-08 ASSESSMENT — ENCOUNTER SYMPTOMS
ABDOMINAL DISTENTION: 0
SHORTNESS OF BREATH: 0

## 2022-11-08 ASSESSMENT — PAIN SCALES - GENERAL: PAINLEVEL_OUTOF10: 2

## 2022-11-08 ASSESSMENT — PAIN - FUNCTIONAL ASSESSMENT: PAIN_FUNCTIONAL_ASSESSMENT: 0-10

## 2022-11-09 ENCOUNTER — HOSPITAL ENCOUNTER (EMERGENCY)
Age: 32
Discharge: HOME | End: 2022-11-09

## 2022-11-09 NOTE — ED NOTES
Pt to ER for c/o abrasion on his R wrist and L knee. Pt noted to have a small abrasion on his R wrist.   Pt noted to be anxious. Dr. Fabi Bean and Dr. Qing Delcid at bedside to evaluate pt.       Romie Krishnan RN  11/08/22 2019

## 2022-11-09 NOTE — ED PROVIDER NOTES
[facility] [facility]     Emergency Department     Faculty Attestation        I performed a history and physical examination of the patient and discussed management with the resident. I reviewed the residents note and agree with the documented findings and plan of care. Any areas of disagreement are noted on the chart. I was personally present for the key portions of any procedures. I have documented in the chart those procedures where I was not present during the key portions. I have reviewed the emergency nurses triage note. I agree with the chief complaint, past medical history, past surgical history, allergies, medications, social and family history as documented unless otherwise noted below. For mid-level providers such as nurse practitioners as well as physicians assistants:    I have personally seen and evaluated the patient. I find the patient's history and physical exam are consistent with NP/PA documentation. I agree with the care provided, treatment rendered, disposition, & follow-up plan. Additional findings are as noted. Vital Signs: /75   Pulse (!) 117   Temp 98.2 °F (36.8 °C) (Oral)   Resp 15   Wt 210 lb (95.3 kg)   SpO2 97%   BMI 32.89 kg/m²   PCP:  No primary care provider on file. Pertinent Comments:     Patient patient complains of rash. Well-known to this emergency department. He complains of itchy sensation is requesting a Benadryl shot. Nontoxic exam appears at baseline from a psychiatric standpoint.       Critical Care  None          Demetria Craig MD    Attending Emergency Medicine Physician            Maritza Nava MD  11/08/22 0777

## 2022-11-09 NOTE — ED NOTES
Pt refusing treatment unless he can have IV Fentanyl. Dr. Aidan Ballard notified.       Susanne Sanderson RN  11/08/22 2017

## 2022-11-09 NOTE — ED PROVIDER NOTES
[facility] ED  Emergency Department Encounter  Non Emergency Medicine Resident     Pt Name: Chao Morrison  MRN: [id number]  [place] 1990  Date of evaluation: 11/8/22  PCP:  No primary care provider on file. CHIEF COMPLAINT       Chief Complaint   Patient presents with    Abrasion     R wrist       HISTORY OF PRESENT ILLNESS  (Location/Symptom, Timing/Onset, Context/Setting,Quality, Duration, Modifying Factors, Severity.)      Chao Morrison is a 28 y.o. male who presents with minor abrasions on his right wrist and left knee. Patient states that he is unable to tell us how he got them he states he just woke up with them. Patient has a history of psychotic disorders and polysubstance abuse. Patient states he is not taking his meds for 4 days and would like Benadryl at this point due to itching all over his body. Patient also would like a drink in some food. Patient complains of no pain, nausea, vomiting shortness of breath chest pain or visual auditory hallucinations.     PAST MEDICAL / SURGICAL /SOCIAL / FAMILY HISTORY      has a past medical history of Anxiety, Arthritis, Asthma, Bipolar I disorder, most recent episode (or current) depressed, unspecified, Clostridium difficile infection, COPD (chronic obstructive pulmonary disease) ([address] Depression, Disease of blood and blood forming organ, Eczema, Fracture, metacarpal, Gastric ulcer, Gastritis, GERD (gastroesophageal reflux disease), GI bleed, H. pylori infection, H/O blood clots, Head injury, Headache, Insomnia, Juvenile rheumatoid arthritis ([address] Neuromuscular disorder ([address] PFAPA syndrome ([address] PUD (peptic ulcer disease), Rheumatoid arthritis ([address] Rheumatoid arthritis(714.0), Severe recurrent major depression without psychotic features ([address] Sleep apnea, 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[address] Substance abuse ([address] Suicidal ideation, Suicide attempt by hanging ([address] Tobacco dependence, Ulcerative colitis ([address] and UTI (urinary tract infection). has a past surgical history that includes Colonoscopy; bronchoscopy; other surgical history; Upper gastrointestinal endoscopy (2/4/16); pr egd transoral biopsy single/multiple (N/A, 3/20/2017); sigmoidoscopy (N/A, 3/20/2017); Cholecystectomy, laparoscopic (07/14/2017); pr esophagogastroduodenoscopy transoral diagnostic (N/A, 8/9/2017); pr colonoscopy w/biopsy single/multiple (8/9/2017); Abdomen surgery; Upper gastrointestinal endoscopy (N/A, 6/13/2018); Upper gastrointestinal endoscopy (N/A, 9/20/2018); and Endoscopy, colon, diagnostic. Social History     Socioeconomic History    Marital status: Single     Spouse name: Not on file    Number of children: Not on file    Years of education: Not on file    Highest education level: Not on file   Occupational History    Occupation: disability   Tobacco Use    Smoking status: Every Day     Packs/day: 0.50     Years: 15.00     Pack years: 7.50     Types: Cigarettes    Smokeless tobacco: Never   Vaping Use    Vaping Use: Former   Substance and Sexual Activity    Alcohol use: Not Currently     Comment: drinks daily    Drug use: Not Currently     Types: Opiates , Cocaine     Comment: Hx of opiates, benzos, cocaine, alcohol abuse    Sexual activity: Yes     Partners: Female     Comment: Lives alone, not working. Other Topics Concern    Not on file   Social History Narrative    ** Merged History Encounter **          Social Determinants of Health     Financial Resource Strain: Medium Risk    Difficulty of Paying Living Expenses: Somewhat hard   Food Insecurity: No Food Insecurity    Worried About Running Out of Food in the Last Year: Never true    Ran Out of Food in the Last Year: Never true   Transportation Needs: No Transportation Needs    Lack of Transportation (Medical): No    Lack of Transportation (Non-Medical):  No   Physical Activity: Inactive    Days of Exercise per Week: 0 days    Minutes of Exercise per Session: 0 min   Stress: Stress Concern Present    Feeling of Stress : Rather much   Social Connections: Socially Isolated    Frequency of Communication with Friends and Family: Never    Frequency of Social Gatherings with Friends and Family: Never    Attends Scientology Services: Never    Active Member of Clubs or Organizations: No    Attends Club or Organization Meetings: Never    Marital Status: Never    Intimate Partner Violence: Not At Risk    Fear of Current or Ex-Partner: No    Emotionally Abused: No    Physically Abused: No    Sexually Abused: No   Housing Stability: Low Risk     Unable to Pay for Housing in the Last Year: No    Number of [place] in the Last Year: 1    Unstable Housing in the Last Year: No       Family History   Problem Relation Age of Onset    Diabetes Father     Alcohol Abuse Father     Depression Father     Arthritis Father     High Blood Pressure Father     Other Father         aneurysm & epilepsy    Migraines Father     Arthritis Mother     Other Mother         aneurysm & epilepsy    Migraines Mother     Diabetes Brother         Aunt and uncles    Depression Brother     Mental Illness Brother     Other Brother         epilepsy    Migraines Brother     Stroke Other         Uncle    Other Brother         murdered Oct 6th, 2014    Colon Cancer Paternal Cousin 43    Other Sister         epilepsy    Migraines Sister        Allergies:  Dicyclomine, Famotidine, Geodon [ziprasidone hcl], Haloperidol, Iv dye [iodides], Reglan [metoclopramide], Ibuprofen, Aspirin, Dicyclomine hcl, Hydroxyzine hcl, Iodine, Metronidazole, Mirtazapine, Promethazine, and Ziprasidone    Home Medications:  Prior to Admission medications    Medication Sig Start Date End Date Taking?  Authorizing Provider   diphenhydrAMINE (BENADRYL) 25 MG capsule Take 1 capsule by mouth every 6 hours as needed for Itching 11/4/22 11/9/22  Cristian Urias MD   cloNIDine (CATAPRES) 0.1 MG tablet Take 1 tablet by mouth nightly for 3 days 7/14/22 7/17/22  Telma Encarnacion MASSIEL Mason - CNP   tiZANidine (ZANAFLEX) 4 MG tablet Take 1 tablet by mouth 2 times daily as needed (muscle spasm) 7/14/22   MASSIEL Swanson CNP   Zinc Oxide 6 % CREA Apply 1 applicator topically in the morning and at bedtime 7/3/22   CHER Rose MD   ARIPiprazole (ABILIFY) 15 MG tablet Take 1 tablet by mouth daily 6/22/22   Maddy Flores MD   cetirizine (ZYRTEC) 10 MG tablet Take 1 tablet by mouth daily 6/22/22   Maddy Flores MD   meloxicam (MOBIC) 7.5 MG tablet Take 1 tablet by mouth 2 times daily as needed for Pain 8/19/21 10/30/21  MASSIEL Jackson CNP       REVIEW OF SYSTEMS    (2-9 systems for level 4, 10 or more for level 5)      Review of Systems   Constitutional:  Negative for activity change and appetite change. HENT:  Negative for congestion. Eyes:  Negative for visual disturbance. Respiratory:  Negative for shortness of breath. Cardiovascular:  Negative for chest pain. Gastrointestinal:  Negative for abdominal distention. Genitourinary:  Negative for difficulty urinating. Skin:  Positive for wound. Mild abrasions on right wrist and left knee. Allergic/Immunologic: Negative for immunocompromised state. Neurological:  Negative for dizziness. Hematological:  Negative for adenopathy. Psychiatric/Behavioral:  Negative for agitation. The patient is nervous/anxious and is hyperactive. PHYSICAL EXAM   (up to 7for level 4, 8 or more for level 5)      INITIAL VITALS:   /75   Pulse (!) 117   Temp 98.2 °F (36.8 °C) (Oral)   Resp 15   Wt 210 lb (95.3 kg)   SpO2 97%   BMI 32.89 kg/m²     Physical Exam  Constitutional:       Comments: Patient is disheveled and smells of urine. HENT:      Right Ear: External ear normal.      Left Ear: External ear normal.      Nose: Nose normal. No congestion. Mouth/Throat:      Mouth: Mucous membranes are moist.   Eyes:      Extraocular Movements: Extraocular movements intact.       Conjunctiva/sclera: Conjunctivae normal.   Cardiovascular:      Rate and Rhythm: Normal rate. Pulmonary:      Effort: Pulmonary effort is normal.      Breath sounds: No wheezing. Abdominal:      General: Abdomen is flat. Palpations: Abdomen is soft. Musculoskeletal:         General: Signs of injury present. No deformity. Right lower leg: No edema. Left lower leg: No edema. Comments: Mild abrasions on right wrist and left knee. Skin:     Capillary Refill: Capillary refill takes less than 2 seconds. Findings: Lesion present. Neurological:      General: No focal deficit present. Mental Status: He is alert. DIFFERENTIAL  DIAGNOSIS     PLAN (LABS / IMAGING / EKG):  No orders of the defined types were placed in this encounter. MEDICATIONSORDERED:  Orders Placed This Encounter   Medications    DISCONTD: diphenhydrAMINE (BENADRYL) injection 50 mg    diphenhydrAMINE (BENADRYL) tablet 50 mg       DDX: Abrasions due to trauma likely however patient does not know how he received them. DIAGNOSTIC RESULTS / EMERGENCY DEPARTMENT COURSE / MDM     LABS:  No results found for this visit on 11/08/22. IMPRESSION: 26-year-old male who comes in complaining of abrasions on right wrist and left knee from an unknown source. Patient also has complaint of itching. Patient states that he is also itchy and would like Benadryl. Patient has past medical history of psychotic disorders and polysubstance abuse. Benadryl 50 mg was offered as well as food and water. RADIOLOGY:  None    EKG  None    All EKG's are interpreted by the Emergency Department Physician who either signs or Co-signsthis chart in the absence of a cardiologist.    EMERGENCY DEPARTMENT COURSE:  Patient was evaluated in the emergency department for minor abrasions on right wrist and left knee. Patient also complained of itching all over. Patient was given food and water. 50 mg Benadryl, patient refused stated he wanted IV fentanyl. Patient was denied fentanyl and he was upset and then escorted out by security. PROCEDURES:  None    CONSULTS:  None    CRITICAL CARE:  None    FINAL IMPRESSION      1. Abrasions of multiple sites          DISPOSITION / PLAN     DISPOSITION Decision To Discharge 11/08/2022 07:47:03 PM      PATIENT REFERRED TO:  [facility] ED  [address] [place] [address]  [phone]  Go to   If symptoms worsen please report back to the emergency department. LÓPEZ Guerrero   [address] [facility] [id number] [phone]  In 3 days  Please follow-up with PCP within the next 3 days for management of comorbid conditions.     DISCHARGE MEDICATIONS:  New Prescriptions    No medications on file       Clemencia Frey MD  [address] [facility]  Non-emergency medicine resident       Clemencia Frey MD  Resident  11/08/22 2014

## 2022-11-29 ENCOUNTER — HOSPITAL ENCOUNTER (EMERGENCY)
Age: 32
Discharge: LEFT BEFORE DISCHARGE | End: 2022-11-29

## 2022-11-29 ENCOUNTER — HOSPITAL ENCOUNTER (EMERGENCY)
Age: 32
Discharge: LEFT BEFORE DISCHARGE | End: 2022-11-29
Attending: EMERGENCY MEDICINE
Payer: MEDICARE

## 2022-11-29 VITALS
DIASTOLIC BLOOD PRESSURE: 77 MMHG | SYSTOLIC BLOOD PRESSURE: 125 MMHG | RESPIRATION RATE: 16 BRPM | OXYGEN SATURATION: 97 % | TEMPERATURE: 97.9 F | HEART RATE: 115 BPM

## 2022-11-29 VITALS — RESPIRATION RATE: 18 BRPM

## 2022-11-29 DIAGNOSIS — Z53.21 ELOPED FROM EMERGENCY DEPARTMENT: Primary | ICD-10-CM

## 2022-11-29 PROCEDURE — 99283 EMERGENCY DEPT VISIT LOW MDM: CPT

## 2022-11-29 ASSESSMENT — PAIN - FUNCTIONAL ASSESSMENT: PAIN_FUNCTIONAL_ASSESSMENT: 0-10

## 2022-11-29 ASSESSMENT — PAIN SCALES - GENERAL: PAINLEVEL_OUTOF10: 10

## 2022-11-29 NOTE — ED NOTES
Pt gets up and states that he wants to leave, pt has unsteady gait, states that his left leg is \"just asleep\" and that it will go away and is asking for a wheelchair to go out to the waiting room. Dr. Dave Zambrano and writer at bedside. Pt informed that if he wants to leave AMA, that he will need to walk himself out. Security notified for safety.       Anant Warren RN  11/29/22 7719

## 2022-11-29 NOTE — ED PROVIDER NOTES
This patient presented to the emergency department via EMS with concerns for possible altered mental status. Patient is a longstanding history of psychiatric disorder as well as polysubstance abuse. Patient refused to be examined or treated while in the department. Patient appeared awake and alert. He was unwilling to remain in the emergency department for evaluation and/or treatment. His gait was antalgic but he was able to ambulate on his own from the emergency department into waiting room. He was encouraged to return to the emergency department for reevaluation should he change his mind.      Darius Osuna MD  11/29/22 4527

## 2022-11-29 NOTE — ED PROVIDER NOTES
[facility]  ED  Emergency Department Encounter  Emergency Medicine Resident     Pt Name: Bryan Liu  MRN: [id number]  [place] 1990  Date of evaluation: 11/29/22  PCP:  No primary care provider on file. CHIEF COMPLAINT       Chief Complaint   Patient presents with    Shoulder Pain     Left shoulder    Drug / Alcohol Assessment       HISTORY OFPRESENT ILLNESS  (Location/Symptom, Timing/Onset, Context/Setting, Quality, Duration, Modifying Factors,Severity.)      Bryan Liu is a 28 y. o.yo male who presents with EMS for concern for altered mental status per family. On initial evaluation patient uncooperative, stating his left shoulder is hurting him but will not elaborate any further. Additionally feeling like \"my feet are numb\". Patient will not specify if he has used any drugs or alcohol. Patient not answering questions, stating he is hungry, stating that he does not want to be here. Unable to obtain further history at this time. PAST MEDICAL / SURGICAL / SOCIAL / FAMILY HISTORY      has a past medical history of Anxiety, Arthritis, Asthma, Bipolar I disorder, most recent episode (or current) depressed, unspecified, Clostridium difficile infection, COPD (chronic obstructive pulmonary disease) ([address] Depression, Disease of blood and blood forming organ, Eczema, Fracture, metacarpal, Gastric ulcer, Gastritis, GERD (gastroesophageal reflux disease), GI bleed, H. pylori infection, H/O blood clots, Head injury, Headache, Insomnia, Juvenile rheumatoid arthritis ([address] Neuromuscular disorder ([address] PFAPA syndrome ([address] PUD (peptic ulcer disease), Rheumatoid arthritis ([address] Rheumatoid arthritis(714.0), Severe recurrent major depression without psychotic features ([address] Sleep apnea, 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[address] Substance abuse ([address] Suicidal ideation, Suicide attempt by hanging ([address] Tobacco dependence, Ulcerative colitis ([address] and UTI (urinary tract infection).      has a past surgical history that includes Colonoscopy; bronchoscopy; other surgical history; Upper gastrointestinal endoscopy (2/4/16); pr egd transoral biopsy single/multiple (N/A, 3/20/2017); sigmoidoscopy (N/A, 3/20/2017); Cholecystectomy, laparoscopic (07/14/2017); pr esophagogastroduodenoscopy transoral diagnostic (N/A, 8/9/2017); pr colonoscopy w/biopsy single/multiple (8/9/2017); Abdomen surgery; Upper gastrointestinal endoscopy (N/A, 6/13/2018); Upper gastrointestinal endoscopy (N/A, 9/20/2018); and Endoscopy, colon, diagnostic. Social History     Socioeconomic History    Marital status: Single     Spouse name: Not on file    Number of children: Not on file    Years of education: Not on file    Highest education level: Not on file   Occupational History    Occupation: disability   Tobacco Use    Smoking status: Every Day     Packs/day: 0.50     Years: 15.00     Pack years: 7.50     Types: Cigarettes    Smokeless tobacco: Never   Vaping Use    Vaping Use: Former   Substance and Sexual Activity    Alcohol use: Not Currently     Comment: drinks daily    Drug use: Not Currently     Types: Opiates , Cocaine     Comment: Hx of opiates, benzos, cocaine, alcohol abuse    Sexual activity: Yes     Partners: Female     Comment: Lives alone, not working. Other Topics Concern    Not on file   Social History Narrative    ** Merged History Encounter **          Social Determinants of Health     Financial Resource Strain: Medium Risk    Difficulty of Paying Living Expenses: Somewhat hard   Food Insecurity: No Food Insecurity    Worried About Running Out of Food in the Last Year: Never true    Ran Out of Food in the Last Year: Never true   Transportation Needs: No Transportation Needs    Lack of Transportation (Medical): No    Lack of Transportation (Non-Medical):  No   Physical Activity: Inactive    Days of Exercise per Week: 0 days    Minutes of Exercise per Session: 0 min   Stress: Stress Concern Present    Feeling of Stress : Rather much   Social Connections: Socially Isolated    Frequency of Communication with Friends and Family: Never    Frequency of Social Gatherings with Friends and Family: Never    Attends Mandaen Services: Never    Active Member of Clubs or Organizations: No    Attends Club or Organization Meetings: Never    Marital Status: Never    Intimate Partner Violence: Not At Risk    Fear of Current or Ex-Partner: No    Emotionally Abused: No    Physically Abused: No    Sexually Abused: No   Housing Stability: Low Risk     Unable to Pay for Housing in the Last Year: No    Number of [place] in the Last Year: 1    Unstable Housing in the Last Year: No       Family History   Problem Relation Age of Onset    Diabetes Father     Alcohol Abuse Father     Depression Father     Arthritis Father     High Blood Pressure Father     Other Father         aneurysm & epilepsy    Migraines Father     Arthritis Mother     Other Mother         aneurysm & epilepsy    Migraines Mother     Diabetes Brother         Aunt and uncles    Depression Brother     Mental Illness Brother     Other Brother         epilepsy    Migraines Brother     Stroke Other         Uncle    Other Brother         murdered Oct 6th, 2014    Colon Cancer Paternal Cousin 43    Other Sister         epilepsy    Migraines Sister         Allergies:  Dicyclomine, Famotidine, Geodon [ziprasidone hcl], Haloperidol, Iv dye [iodides], Reglan [metoclopramide], Ibuprofen, Aspirin, Dicyclomine hcl, Hydroxyzine hcl, Iodine, Metronidazole, Mirtazapine, Promethazine, and Ziprasidone    Home Medications:  Prior to Admission medications    Medication Sig Start Date End Date Taking?  Authorizing Provider   cloNIDine (CATAPRES) 0.1 MG tablet Take 1 tablet by mouth nightly for 3 days 7/14/22 7/17/22  Diamantina Kocher, APRN - CNP   tiZANidine (ZANAFLEX) 4 MG tablet Take 1 tablet by mouth 2 times daily as needed (muscle spasm) 7/14/22   Diamantina Kocher, APRN - CNP   Zinc Oxide 6 % CREA Apply 1 applicator topically in the morning and at bedtime 7/3/22   CHER Saravia MD   ARIPiprazole (ABILIFY) 15 MG tablet Take 1 tablet by mouth daily 6/22/22   Kimber Vyas MD   cetirizine (ZYRTEC) 10 MG tablet Take 1 tablet by mouth daily 6/22/22   Kimber Vyas MD   meloxicam (MOBIC) 7.5 MG tablet Take 1 tablet by mouth 2 times daily as needed for Pain 8/19/21 10/30/21  Abrahan Dec, APRN - CNP       REVIEW OFSYSTEMS    (2-9 systems for level 4, 10 or more for level 5)      Review of Systems   Reason unable to perform ROS: Patient refused. PHYSICAL EXAM   (up to 7 for level 4, 8 or more forlevel 5)      INITIAL VITALS:   Vitals:    11/29/22 0843   Resp: 18         Physical Exam  Vitals reviewed: Patient refused vital signs. Patient refuses physical exam.. DIFFERENTIAL  DIAGNOSIS     PLAN (LABS / IMAGING / EKG):  No orders of the defined types were placed in this encounter. MEDICATIONS ORDERED:  No orders of the defined types were placed in this encounter. DDX: Shoulder pain treatment, refusal of treatment    Initial MDM/Plan: 28 y.o. male who presents with EMS after altered mental status reported by family. On arrival here patient is stating that his feet feel numb as well as left shoulder pain. Patient very difficult with history. Patient refusing to answer most questions. Patient states he does not want to be here. Patient refused to take his sweatshirt off to have vital signs. Patient refused physical exam.  Patient walked out of emergency department. Patient repeatedly asking for help to get out however we explained that if he is choosing to refuse an evaluation and to develop he needs to leave on his own. Patient walked out of the emergency department. Patient eloped.     DIAGNOSTIC RESULTS / EMERGENCYDEPARTMENT COURSE / MDM     LABS:  Labs Reviewed - No data to display      EMERGENCY DEPARTMENT COURSE:  ED Course as of 11/29/22 [facility] Nov 29, 2022   4291 Attempted to evaluate patient. Patient refused evaluation, walked out of emergency department. Patient eloped. [AB]      ED Course User Index  [AB] Lashell Sequeira DO          PROCEDURES:  None    CONSULTS:  None    CRITICAL CARE:  See attending physician note    FINAL IMPRESSION      1. Eloped from emergency department          DISPOSITION / [address] - Left Before Treatment Complete 11/29/2022 08:48:30 AM      PATIENT REFERRED TO:  No follow-up provider specified.     DISCHARGE MEDICATIONS:  Discharge Medication List as of 11/29/2022  8:53 AM          Anna Villarreal DO  Emergency Medicine Resident    (Please note that portions of this note were completed with a voice recognition program.Efforts were made to edit the dictations but occasionally words are mis-transcribed.)        Lashell Sequeira DO  Resident  11/29/22 7725

## 2022-11-29 NOTE — ED NOTES
Pt uncooperative, not letting staff get vitals or assess patient.       Blayne Lima, RN  11/29/22 8248

## 2022-11-29 NOTE — ED NOTES
Pt states that he has left shoulder pain. When asked to take sweatshirt off and to get into gown to assess shoulder, pt refusing stating \"I just want to go, I'll be fine, I don't need to be here\".         Blayne Lima, RN  11/29/22 5143

## 2022-11-29 NOTE — ED NOTES
Pt attempting to leave department, unsteady gait. Pt encouraged by staff to stay and be evaluated. Pt continues to yell and state that he wants to leave and is requesting a wheelchair to use to leave with. Pt again, encouraged to stay and be evaluated. Security at bedside. Pt leaves department with unsteady gait against medical advice.       Wilfredo Olea RN  11/29/22 3700

## 2022-11-30 NOTE — ED NOTES
Pt accompanied by 4 RNs to ED room. Pt would not get into ED bed and asked to be removed from ED room in wheelchair. Pt began yelling at staff. Pt stated he did not want to be seen. Pt escorted back out to [address] [place] [address] by RN in wheelchair. Security notified.       Ange Warren RN  11/29/22 4826

## 2022-12-04 ENCOUNTER — HOSPITAL ENCOUNTER (EMERGENCY)
Age: 32
Discharge: HOME | End: 2022-12-04
Attending: EMERGENCY MEDICINE
Payer: MEDICARE

## 2022-12-04 VITALS
HEIGHT: 67 IN | WEIGHT: 200 LBS | TEMPERATURE: 98.6 F | BODY MASS INDEX: 31.39 KG/M2 | RESPIRATION RATE: 16 BRPM | SYSTOLIC BLOOD PRESSURE: 142 MMHG | OXYGEN SATURATION: 95 % | DIASTOLIC BLOOD PRESSURE: 90 MMHG | HEART RATE: 78 BPM

## 2022-12-04 DIAGNOSIS — T14.8XXA ABRASION: Primary | ICD-10-CM

## 2022-12-04 PROCEDURE — 99283 EMERGENCY DEPT VISIT LOW MDM: CPT

## 2022-12-04 RX ORDER — ALPRAZOLAM 0.5 MG/1
TABLET ORAL
COMMUNITY
Start: 2022-09-04

## 2022-12-04 RX ORDER — TIZANIDINE 4 MG/1
4 TABLET ORAL EVERY 6 HOURS PRN
Qty: 8 TABLET | Refills: 0 | Status: SHIPPED | OUTPATIENT
Start: 2022-12-04

## 2022-12-04 RX ORDER — DIAPER,BRIEF,INFANT-TODD,DISP
EACH MISCELLANEOUS
Qty: 30 G | Refills: 0 | Status: SHIPPED | OUTPATIENT
Start: 2022-12-04

## 2022-12-04 RX ORDER — QUETIAPINE FUMARATE 100 MG/1
TABLET, FILM COATED ORAL
COMMUNITY
Start: 2022-10-31

## 2022-12-04 NOTE — ED PROVIDER NOTES
[address] [place] [address] ED  EMERGENCY DEPARTMENT ENCOUNTER      Pt Name: Daniel Villaseñor  MRN: [id number]  [place] 1990  Date of evaluation: 12/4/2022  Provider: Joseph Hay MD    CHIEF COMPLAINT       Chief Complaint   Patient presents with    Laceration     Pt has little cuts across his chest and states he doesn't know where they are from. HISTORY OF PRESENT ILLNESS  (Location/Symptom, Timing/Onset, Context/Setting, Quality, Duration, Modifying Factors, Severity.)   Daniel Villaseñor is a 28 y.o. male who presents to the emergency department for scratches on his chest.  He states he does not know how they got there. He states that they itch and then he scratches them. He also wants some Zanaflex. He states he frequently has muscle cramps. He is a very poor historian. Nursing Notes were reviewed.     ALLERGIES     Dicyclomine, Famotidine, Geodon [ziprasidone hcl], Haloperidol, Iv dye [iodides], Reglan [metoclopramide], Ibuprofen, Aspirin, Dicyclomine hcl, Hydroxyzine hcl, Iodine, Metronidazole, Mirtazapine, Promethazine, and Ziprasidone    CURRENT MEDICATIONS       Previous Medications    ALPRAZOLAM (XANAX) 0.5 MG TABLET        ARIPIPRAZOLE (ABILIFY) 15 MG TABLET    Take 1 tablet by mouth daily    CETIRIZINE (ZYRTEC) 10 MG TABLET    Take 1 tablet by mouth daily    CLONIDINE (CATAPRES) 0.1 MG TABLET    Take 1 tablet by mouth nightly for 3 days    QUETIAPINE (SEROQUEL) 100 MG TABLET        ZINC OXIDE 6 % CREA    Apply 1 applicator topically in the morning and at bedtime       PAST MEDICAL HISTORY         Diagnosis Date    Anxiety     Arthritis     Asthma     Bipolar I disorder, most recent episode (or current) depressed, unspecified 9/12/2014    Clostridium difficile infection     COPD (chronic obstructive pulmonary disease) ([facility])     Depression     Disease of blood and blood forming organ     Eczema     Fracture, metacarpal     R 4th and 5th    Gastric ulcer     Gastritis 06/13/2018    GERD (gastroesophageal reflux disease)     GI bleed     H. 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HCC)     PUD (peptic ulcer disease)     Rheumatoid arthritis (HCC)     Rheumatoid arthritis(714.0)     Severe recurrent major depression without psychotic features ([facility] 11/21/2021    Sleep apnea     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[facility]     Substance abuse ([facility]     Hx of opiates, benzos, cocaine, alcohol abuse    Suicidal ideation     Suicide attempt by hanging ([facility]     Tobacco dependence     Ulcerative colitis ([facility]     UTI (urinary tract infection)        SURGICAL HISTORY           Procedure Laterality Date    ABDOMEN SURGERY      upper GI scope 7/7/2015    BRONCHOSCOPY      CHOLECYSTECTOMY, LAPAROSCOPIC  07/14/2017    surgery performed at [address] [place] [address], COLON, DIAGNOSTIC      OTHER SURGICAL HISTORY      lumbar puncture    ME COLONOSCOPY W/BIOPSY SINGLE/MULTIPLE  8/9/2017    COLONOSCOPY WITH BIOPSY performed by Chrissie Noe MD at [facility]    ME EGD TRANSORAL BIOPSY SINGLE/MULTIPLE N/A 3/20/2017    EGD BIOPSY performed by Sherly Castle MD at [facility]    ME ESOPHAGOGASTRODUODENOSCOPY TRANSORAL DIAGNOSTIC N/A 8/9/2017    EGD ESOPHAGOGASTRODUODENOSCOPY performed by Chrissie Noe MD at [facility] N/A 3/20/2017    SIGMOIDOSCOPY DIAGNOSTIC FLEXIBLE performed by Sherly Castle MD at [place] [address]  2/4/16    UPPER GASTROINTESTINAL ENDOSCOPY N/A 6/13/2018    GASTRITIS    UPPER GASTROINTESTINAL ENDOSCOPY N/A 9/20/2018    EGD BIOPSY performed by Karrie Bah MD at [address] [place] [address]           Problem Relation Age of Onset    Diabetes Father     Alcohol Abuse Father     Depression Father     Arthritis Father     High Blood Pressure Father     Other Father aneurysm & epilepsy    Migraines Father     Arthritis Mother     Other Mother         aneurysm & epilepsy    Migraines Mother     Diabetes Brother         Aunt and uncles    Depression Brother     Mental Illness Brother     Other Brother         epilepsy    Migraines Brother     Stroke Other         Uncle    Other Brother         murdered Oct 6th, 2014    Colon Cancer Paternal Cousin 43    Other Sister         epilepsy    Migraines Sister      Family Status   Relation Name Status    Father  Alive    Mother  Alive    Brother  (Not Specified)    Other  (Not Specified)    Brother  (Not Specified)    PCousin  (Not Specified)    Sister  (Not Specified)        SOCIAL HISTORY      reports that he has been smoking cigarettes. He has a 7.50 pack-year smoking history. He has never used smokeless tobacco. He reports that he does not currently use alcohol. He reports that he does not currently use drugs after having used the following drugs: Opiates  and Cocaine. REVIEW OF SYSTEMS    (2-9 systems for level 4, 10 or more for level 5)     Review of Systems   Unable to perform ROS: Psychiatric disorder      Except as noted above the remainder of the review of systems was reviewed and negative. PHYSICAL EXAM    (up to 7 for level 4, 8 or more for level 5)     Vitals:    12/04/22 1234   BP: (!) 142/90   Pulse: 78   Resp: 16   Temp: 98.6 °F (37 °C)   TempSrc: Oral   SpO2: 95%   Weight: 200 lb (90.7 kg)   Height: 5' 7\" (1.702 m)       Physical Exam  Vitals reviewed. Constitutional:       General: He is not in acute distress. Appearance: He is well-developed. He is not diaphoretic. HENT:      Head: Normocephalic and atraumatic. Eyes:      General: No scleral icterus. Right eye: No discharge. Left eye: No discharge. Cardiovascular:      Rate and Rhythm: Normal rate and regular rhythm. Pulmonary:      Effort: Pulmonary effort is normal. No respiratory distress. Breath sounds: Normal breath sounds. No stridor. No wheezing or rales. Abdominal:      General: There is no distension. Palpations: Abdomen is soft. Tenderness: There is no abdominal tenderness. Musculoskeletal:         General: Normal range of motion. Cervical back: Neck supple. Lymphadenopathy:      Cervical: No cervical adenopathy. Skin:     General: Skin is warm and dry. Findings: No erythema or rash. Comments: He has several very small abrasions present on his chest.  No lacerations are present. Neurological:      Mental Status: He is alert. Comments: Awake and alert   Psychiatric:         Behavior: Behavior normal.           DIAGNOSTIC RESULTS     EKG: All EKG's are interpreted by the Emergency Department Physician who either signs or Co-signs this chart in the absence of a cardiologist.    Not indicated    RADIOLOGY:   Non-plain film images such as CT, Ultrasound and MRI are read by the radiologist. Plain radiographic images are visualized and preliminarily interpreted by the emergency physician with the below findings:    Not indicated    Interpretation per the Radiologist below, if available at the time of this note:        LABS:  Labs Reviewed - No data to display    All other labs were within normal range or not returned as of this dictation. EMERGENCY DEPARTMENT COURSE and DIFFERENTIAL DIAGNOSIS/MDM:   Vitals:    Vitals:    12/04/22 1234   BP: (!) 142/90   Pulse: 78   Resp: 16   Temp: 98.6 °F (37 °C)   TempSrc: Oral   SpO2: 95%   Weight: 200 lb (90.7 kg)   Height: 5' 7\" (1.702 m)       Orders Placed This Encounter   Medications    tiZANidine (ZANAFLEX) 4 MG tablet     Sig: Take 1 tablet by mouth every 6 hours as needed (muscle pain)     Dispense:  8 tablet     Refill:  0    hydrocortisone 1 % cream     Sig: Apply topically 2 -3 times daily for 5 - 7 days. Dispense:  30 g     Refill:  0       Medical Decision Making: He is prescribed hydrocortisone and Zanaflex and released.   He was encouraged to have less frequent emergency department visits. CONSULTS:  None    PROCEDURES:  None    FINAL IMPRESSION      1. Abrasion          DISPOSITION/PLAN   DISPOSITION Decision To Discharge 12/04/2022 01:26:11 PM      PATIENT REFERRED TO:   [facility] ED  [address] [facility]  [phone]    If symptoms worsen      DISCHARGE MEDICATIONS:     New Prescriptions    HYDROCORTISONE 1 % CREAM    Apply topically 2 -3 times daily for 5 - 7 days. TIZANIDINE (ZANAFLEX) 4 MG TABLET    Take 1 tablet by mouth every 6 hours as needed (muscle pain)       The care is provided during an unprecedented national emergency due to the novel coronavirus, COVID-19.     (Please note that portions of this note were completed with a voice recognition program.  Efforts were made to edit the dictations but occasionally words are mis-transcribed.)    Kimber Hutchinson MD  Attending Emergency Physician           Kimber Hutchinson MD  12/04/22 [id number]

## 2022-12-13 ENCOUNTER — APPOINTMENT (OUTPATIENT)
Dept: CT IMAGING | Age: 32
DRG: 871 | End: 2022-12-13
Payer: MEDICARE

## 2022-12-13 ENCOUNTER — APPOINTMENT (OUTPATIENT)
Dept: GENERAL RADIOLOGY | Age: 32
DRG: 871 | End: 2022-12-13
Payer: MEDICARE

## 2022-12-13 ENCOUNTER — HOSPITAL ENCOUNTER (INPATIENT)
Age: 32
LOS: 1 days | Discharge: LEFT BEFORE DISCHARGE | DRG: 871 | End: 2022-12-14
Attending: EMERGENCY MEDICINE | Admitting: INTERNAL MEDICINE
Payer: MEDICARE

## 2022-12-13 DIAGNOSIS — J84.116 CRYPTOGENIC ORGANIZING PNEUMONIA (HCC): ICD-10-CM

## 2022-12-13 DIAGNOSIS — R09.02 HYPOXIA: Primary | ICD-10-CM

## 2022-12-13 DIAGNOSIS — R77.8 ELEVATED TROPONIN: ICD-10-CM

## 2022-12-13 DIAGNOSIS — R41.0 DELIRIUM: ICD-10-CM

## 2022-12-13 PROBLEM — J96.01 ACUTE RESPIRATORY FAILURE WITH HYPOXIA (HCC): Status: ACTIVE | Noted: 2022-12-13

## 2022-12-13 LAB
ABSOLUTE EOS #: <0.03 K/UL (ref 0–0.44)
ABSOLUTE IMMATURE GRANULOCYTE: 0.05 K/UL (ref 0–0.3)
ABSOLUTE LYMPH #: 1.42 K/UL (ref 1.1–3.7)
ABSOLUTE MONO #: 1.05 K/UL (ref 0.1–1.2)
ACETAMINOPHEN LEVEL: <5 UG/ML (ref 10–30)
ALBUMIN SERPL-MCNC: 3.8 G/DL (ref 3.5–5.2)
ALBUMIN/GLOBULIN RATIO: 1.2 (ref 1–2.5)
ALLEN TEST: POSITIVE
ALLEN TEST: POSITIVE
ALP BLD-CCNC: 77 U/L (ref 40–129)
ALT SERPL-CCNC: 146 U/L (ref 5–41)
AMMONIA: 32 UMOL/L (ref 16–60)
AMORPHOUS: ABNORMAL
AMPHETAMINE SCREEN URINE: NEGATIVE
ANION GAP SERPL CALCULATED.3IONS-SCNC: 17 MMOL/L (ref 9–17)
ANION GAP: 12 MMOL/L (ref 7–16)
AST SERPL-CCNC: 462 U/L
BACTERIA: ABNORMAL
BARBITURATE SCREEN URINE: NEGATIVE
BASOPHILS # BLD: 0 % (ref 0–2)
BASOPHILS ABSOLUTE: 0.04 K/UL (ref 0–0.2)
BENZODIAZEPINE SCREEN, URINE: POSITIVE
BILIRUB SERPL-MCNC: 0.4 MG/DL (ref 0.3–1.2)
BILIRUBIN URINE: NEGATIVE
BUN BLDV-MCNC: 20 MG/DL (ref 6–20)
C-REACTIVE PROTEIN: 101 MG/L (ref 0–5)
CALCIUM SERPL-MCNC: 8.4 MG/DL (ref 8.6–10.4)
CANNABINOID SCREEN URINE: NEGATIVE
CARBOXYHEMOGLOBIN: 1.3 % (ref 0–5)
CASTS UA: ABNORMAL /LPF (ref 0–2)
CHLORIDE BLD-SCNC: 96 MMOL/L (ref 98–107)
CO2: 24 MMOL/L (ref 20–31)
COCAINE METABOLITE, URINE: POSITIVE
COLOR: YELLOW
CREAT SERPL-MCNC: 1.24 MG/DL (ref 0.7–1.2)
D-DIMER QUANTITATIVE: 1.42 MG/L FEU
EGFR, POC: >60 ML/MIN/1.73M2
EOSINOPHILS RELATIVE PERCENT: 0 % (ref 1–4)
EPITHELIAL CELLS UA: ABNORMAL /HPF (ref 0–5)
ETHANOL PERCENT: <0.01 %
ETHANOL: <10 MG/DL
FENTANYL URINE: POSITIVE
FIO2: 100
FIO2: 35
FIO2: ABNORMAL
GFR SERPL CREATININE-BSD FRML MDRD: >60 ML/MIN/1.73M2
GLUCOSE BLD-MCNC: 104 MG/DL (ref 74–100)
GLUCOSE BLD-MCNC: 143 MG/DL (ref 74–100)
GLUCOSE BLD-MCNC: 94 MG/DL (ref 70–99)
GLUCOSE URINE: NEGATIVE
HCO3 VENOUS: 28.4 MMOL/L (ref 24–30)
HCT VFR BLD CALC: 35.5 % (ref 40.7–50.3)
HEMOGLOBIN: 11.6 G/DL (ref 13–17)
IMMATURE GRANULOCYTES: 0 %
INR BLD: 1.1
KETONES, URINE: ABNORMAL
LACTIC ACID, WHOLE BLOOD: 1.9 MMOL/L (ref 0.7–2.1)
LEUKOCYTE ESTERASE, URINE: NEGATIVE
LIPASE: 16 U/L (ref 13–60)
LYMPHOCYTES # BLD: 12 % (ref 24–43)
MAGNESIUM: 1.7 MG/DL (ref 1.6–2.6)
MCH RBC QN AUTO: 30.2 PG (ref 25.2–33.5)
MCHC RBC AUTO-ENTMCNC: 32.7 G/DL (ref 28.4–34.8)
MCV RBC AUTO: 92.4 FL (ref 82.6–102.9)
METHADONE SCREEN, URINE: NEGATIVE
MONOCYTES # BLD: 9 % (ref 3–12)
NITRITE, URINE: NEGATIVE
NRBC AUTOMATED: 0 PER 100 WBC
O2 DEVICE/FLOW/%: ABNORMAL
O2 SAT, VEN: 96.7 % (ref 60–85)
OPIATES, URINE: NEGATIVE
OXYCODONE SCREEN URINE: NEGATIVE
PARTIAL THROMBOPLASTIN TIME: 23.1 SEC (ref 20.5–30.5)
PARTIAL THROMBOPLASTIN TIME: 66.2 SEC (ref 20.5–30.5)
PATIENT TEMP: 37
PCO2, VEN: 62.1 MM HG (ref 39–55)
PDW BLD-RTO: 13 % (ref 11.8–14.4)
PH UA: 5.5 (ref 5–8)
PH VENOUS: 7.28 (ref 7.32–7.42)
PHENCYCLIDINE, URINE: NEGATIVE
PLATELET # BLD: 314 K/UL (ref 138–453)
PMV BLD AUTO: 10.5 FL (ref 8.1–13.5)
PO2, VEN: 101 MM HG (ref 30–50)
POC BUN: 18 MG/DL (ref 8–26)
POC CHLORIDE: 98 MMOL/L (ref 98–107)
POC CREATININE: 0.91 MG/DL (ref 0.51–1.19)
POC HCO3: 28.3 MMOL/L (ref 21–28)
POC HCO3: 29.6 MMOL/L (ref 21–28)
POC HEMATOCRIT: 36 % (ref 41–53)
POC HEMOGLOBIN: 12.4 G/DL (ref 13.5–17.5)
POC IONIZED CALCIUM: 1.12 MMOL/L (ref 1.15–1.33)
POC LACTIC ACID: 1.04 MMOL/L (ref 0.56–1.39)
POC LACTIC ACID: 1.35 MMOL/L (ref 0.56–1.39)
POC O2 SATURATION: 100 % (ref 94–98)
POC O2 SATURATION: 96 % (ref 94–98)
POC PCO2: 43.2 MM HG (ref 35–48)
POC PCO2: 44.1 MM HG (ref 35–48)
POC PH: 7.42 (ref 7.35–7.45)
POC PH: 7.43 (ref 7.35–7.45)
POC PO2: 603 MM HG (ref 83–108)
POC PO2: 82.4 MM HG (ref 83–108)
POC POTASSIUM: 3.7 MMOL/L (ref 3.5–4.5)
POC SODIUM: 138 MMOL/L (ref 138–146)
POC TCO2: 29 MMOL/L (ref 22–30)
POSITIVE BASE EXCESS, ART: 3 (ref 0–3)
POSITIVE BASE EXCESS, ART: 5 (ref 0–3)
POSITIVE BASE EXCESS, VEN: 1 MMOL/L (ref 0–2)
POTASSIUM SERPL-SCNC: 3.8 MMOL/L (ref 3.7–5.3)
PRO-BNP: 3460 PG/ML
PROCALCITONIN: 0.67 NG/ML
PROTEIN UA: ABNORMAL
PROTHROMBIN TIME: 12 SEC (ref 9.1–12.3)
RBC # BLD: 3.84 M/UL (ref 4.21–5.77)
RBC UA: ABNORMAL /HPF (ref 0–2)
SALICYLATE LEVEL: <1 MG/DL (ref 3–10)
SAMPLE SITE: ABNORMAL
SAMPLE SITE: ABNORMAL
SARS-COV-2, RAPID: NOT DETECTED
SEG NEUTROPHILS: 79 % (ref 36–65)
SEGMENTED NEUTROPHILS ABSOLUTE COUNT: 9.5 K/UL (ref 1.5–8.1)
SODIUM BLD-SCNC: 137 MMOL/L (ref 135–144)
SPECIFIC GRAVITY UA: 1.02 (ref 1–1.03)
SPECIMEN DESCRIPTION: NORMAL
TEST INFORMATION: ABNORMAL
TOTAL PROTEIN: 6.9 G/DL (ref 6.4–8.3)
TOXIC TRICYCLIC SC,BLOOD: NEGATIVE
TROPONIN, HIGH SENSITIVITY: 124 NG/L (ref 0–22)
TROPONIN, HIGH SENSITIVITY: 99 NG/L (ref 0–22)
TSH SERPL DL<=0.05 MIU/L-ACNC: 0.41 UIU/ML (ref 0.3–5)
TURBIDITY: CLEAR
URINE HGB: ABNORMAL
UROBILINOGEN, URINE: NORMAL
WBC # BLD: 12.1 K/UL (ref 3.5–11.3)
WBC UA: ABNORMAL /HPF (ref 0–5)

## 2022-12-13 PROCEDURE — 82330 ASSAY OF CALCIUM: CPT

## 2022-12-13 PROCEDURE — 86317 IMMUNOASSAY INFECTIOUS AGENT: CPT

## 2022-12-13 PROCEDURE — 31500 INSERT EMERGENCY AIRWAY: CPT

## 2022-12-13 PROCEDURE — 6360000002 HC RX W HCPCS

## 2022-12-13 PROCEDURE — 80053 COMPREHEN METABOLIC PANEL: CPT

## 2022-12-13 PROCEDURE — 99285 EMERGENCY DEPT VISIT HI MDM: CPT

## 2022-12-13 PROCEDURE — 84520 ASSAY OF UREA NITROGEN: CPT

## 2022-12-13 PROCEDURE — 87070 CULTURE OTHR SPECIMN AEROBIC: CPT

## 2022-12-13 PROCEDURE — 82565 ASSAY OF CREATININE: CPT

## 2022-12-13 PROCEDURE — 87040 BLOOD CULTURE FOR BACTERIA: CPT

## 2022-12-13 PROCEDURE — 2700000000 HC OXYGEN THERAPY PER DAY

## 2022-12-13 PROCEDURE — 96372 THER/PROPH/DIAG INJ SC/IM: CPT

## 2022-12-13 PROCEDURE — 3209999900 CT THORACIC SPINE TRAUMA RECONSTRUCTION

## 2022-12-13 PROCEDURE — 84443 ASSAY THYROID STIM HORMONE: CPT

## 2022-12-13 PROCEDURE — 82947 ASSAY GLUCOSE BLOOD QUANT: CPT

## 2022-12-13 PROCEDURE — 84145 PROCALCITONIN (PCT): CPT

## 2022-12-13 PROCEDURE — 74176 CT ABD & PELVIS W/O CONTRAST: CPT

## 2022-12-13 PROCEDURE — 85014 HEMATOCRIT: CPT

## 2022-12-13 PROCEDURE — 72125 CT NECK SPINE W/O DYE: CPT

## 2022-12-13 PROCEDURE — 6360000002 HC RX W HCPCS: Performed by: STUDENT IN AN ORGANIZED HEALTH CARE EDUCATION/TRAINING PROGRAM

## 2022-12-13 PROCEDURE — 87449 NOS EACH ORGANISM AG IA: CPT

## 2022-12-13 PROCEDURE — 71260 CT THORAX DX C+: CPT | Performed by: STUDENT IN AN ORGANIZED HEALTH CARE EDUCATION/TRAINING PROGRAM

## 2022-12-13 PROCEDURE — 6360000004 HC RX CONTRAST MEDICATION: Performed by: STUDENT IN AN ORGANIZED HEALTH CARE EDUCATION/TRAINING PROGRAM

## 2022-12-13 PROCEDURE — 87635 SARS-COV-2 COVID-19 AMP PRB: CPT

## 2022-12-13 PROCEDURE — 85379 FIBRIN DEGRADATION QUANT: CPT

## 2022-12-13 PROCEDURE — 96375 TX/PRO/DX INJ NEW DRUG ADDON: CPT

## 2022-12-13 PROCEDURE — 81001 URINALYSIS AUTO W/SCOPE: CPT

## 2022-12-13 PROCEDURE — 2580000003 HC RX 258

## 2022-12-13 PROCEDURE — 96376 TX/PRO/DX INJ SAME DRUG ADON: CPT

## 2022-12-13 PROCEDURE — 94761 N-INVAS EAR/PLS OXIMETRY MLT: CPT

## 2022-12-13 PROCEDURE — 71045 X-RAY EXAM CHEST 1 VIEW: CPT

## 2022-12-13 PROCEDURE — 84484 ASSAY OF TROPONIN QUANT: CPT

## 2022-12-13 PROCEDURE — 86140 C-REACTIVE PROTEIN: CPT

## 2022-12-13 PROCEDURE — 87205 SMEAR GRAM STAIN: CPT

## 2022-12-13 PROCEDURE — 3209999900 CT LUMBAR SPINE TRAUMA RECONSTRUCTION

## 2022-12-13 PROCEDURE — 36415 COLL VENOUS BLD VENIPUNCTURE: CPT

## 2022-12-13 PROCEDURE — 73630 X-RAY EXAM OF FOOT: CPT

## 2022-12-13 PROCEDURE — 82803 BLOOD GASES ANY COMBINATION: CPT

## 2022-12-13 PROCEDURE — 70450 CT HEAD/BRAIN W/O DYE: CPT

## 2022-12-13 PROCEDURE — 96366 THER/PROPH/DIAG IV INF ADDON: CPT

## 2022-12-13 PROCEDURE — 96368 THER/DIAG CONCURRENT INF: CPT

## 2022-12-13 PROCEDURE — 85025 COMPLETE CBC W/AUTO DIFF WBC: CPT

## 2022-12-13 PROCEDURE — 5A1935Z RESPIRATORY VENTILATION, LESS THAN 24 CONSECUTIVE HOURS: ICD-10-PCS | Performed by: INTERNAL MEDICINE

## 2022-12-13 PROCEDURE — 87154 CUL TYP ID BLD PTHGN 6+ TRGT: CPT

## 2022-12-13 PROCEDURE — 87641 MR-STAPH DNA AMP PROBE: CPT

## 2022-12-13 PROCEDURE — 85610 PROTHROMBIN TIME: CPT

## 2022-12-13 PROCEDURE — 83735 ASSAY OF MAGNESIUM: CPT

## 2022-12-13 PROCEDURE — 96367 TX/PROPH/DG ADDL SEQ IV INF: CPT

## 2022-12-13 PROCEDURE — 2000000000 HC ICU R&B

## 2022-12-13 PROCEDURE — 93005 ELECTROCARDIOGRAM TRACING: CPT | Performed by: STUDENT IN AN ORGANIZED HEALTH CARE EDUCATION/TRAINING PROGRAM

## 2022-12-13 PROCEDURE — 2500000003 HC RX 250 WO HCPCS: Performed by: STUDENT IN AN ORGANIZED HEALTH CARE EDUCATION/TRAINING PROGRAM

## 2022-12-13 PROCEDURE — 86738 MYCOPLASMA ANTIBODY: CPT

## 2022-12-13 PROCEDURE — G0480 DRUG TEST DEF 1-7 CLASSES: HCPCS

## 2022-12-13 PROCEDURE — 85730 THROMBOPLASTIN TIME PARTIAL: CPT

## 2022-12-13 PROCEDURE — 80179 DRUG ASSAY SALICYLATE: CPT

## 2022-12-13 PROCEDURE — 80143 DRUG ASSAY ACETAMINOPHEN: CPT

## 2022-12-13 PROCEDURE — 80051 ELECTROLYTE PANEL: CPT

## 2022-12-13 PROCEDURE — 87389 HIV-1 AG W/HIV-1&-2 AB AG IA: CPT

## 2022-12-13 PROCEDURE — 82805 BLOOD GASES W/O2 SATURATION: CPT

## 2022-12-13 PROCEDURE — 73610 X-RAY EXAM OF ANKLE: CPT

## 2022-12-13 PROCEDURE — 0BH17EZ INSERTION OF ENDOTRACHEAL AIRWAY INTO TRACHEA, VIA NATURAL OR ARTIFICIAL OPENING: ICD-10-PCS | Performed by: EMERGENCY MEDICINE

## 2022-12-13 PROCEDURE — 82140 ASSAY OF AMMONIA: CPT

## 2022-12-13 PROCEDURE — 74018 RADEX ABDOMEN 1 VIEW: CPT

## 2022-12-13 PROCEDURE — 80307 DRUG TEST PRSMV CHEM ANLYZR: CPT

## 2022-12-13 PROCEDURE — 94002 VENT MGMT INPAT INIT DAY: CPT

## 2022-12-13 PROCEDURE — 83605 ASSAY OF LACTIC ACID: CPT

## 2022-12-13 PROCEDURE — 36600 WITHDRAWAL OF ARTERIAL BLOOD: CPT

## 2022-12-13 PROCEDURE — 83690 ASSAY OF LIPASE: CPT

## 2022-12-13 PROCEDURE — 96365 THER/PROPH/DIAG IV INF INIT: CPT

## 2022-12-13 PROCEDURE — 87899 AGENT NOS ASSAY W/OPTIC: CPT

## 2022-12-13 PROCEDURE — 83880 ASSAY OF NATRIURETIC PEPTIDE: CPT

## 2022-12-13 RX ORDER — SODIUM CHLORIDE 9 MG/ML
INJECTION, SOLUTION INTRAVENOUS PRN
Status: DISCONTINUED | OUTPATIENT
Start: 2022-12-13 | End: 2022-12-14 | Stop reason: HOSPADM

## 2022-12-13 RX ORDER — ONDANSETRON 4 MG/1
4 TABLET, ORALLY DISINTEGRATING ORAL EVERY 8 HOURS PRN
Status: DISCONTINUED | OUTPATIENT
Start: 2022-12-13 | End: 2022-12-14 | Stop reason: HOSPADM

## 2022-12-13 RX ORDER — ACETAMINOPHEN 650 MG/1
650 SUPPOSITORY RECTAL EVERY 6 HOURS PRN
Status: DISCONTINUED | OUTPATIENT
Start: 2022-12-13 | End: 2022-12-14 | Stop reason: HOSPADM

## 2022-12-13 RX ORDER — ONDANSETRON 2 MG/ML
4 INJECTION INTRAMUSCULAR; INTRAVENOUS EVERY 6 HOURS PRN
Status: DISCONTINUED | OUTPATIENT
Start: 2022-12-13 | End: 2022-12-14 | Stop reason: HOSPADM

## 2022-12-13 RX ORDER — SODIUM CHLORIDE 0.9 % (FLUSH) 0.9 %
5-40 SYRINGE (ML) INJECTION EVERY 12 HOURS SCHEDULED
Status: DISCONTINUED | OUTPATIENT
Start: 2022-12-13 | End: 2022-12-14 | Stop reason: HOSPADM

## 2022-12-13 RX ORDER — MIDAZOLAM HYDROCHLORIDE 2 MG/2ML
2 INJECTION, SOLUTION INTRAMUSCULAR; INTRAVENOUS
Status: DISCONTINUED | OUTPATIENT
Start: 2022-12-13 | End: 2022-12-14

## 2022-12-13 RX ORDER — SODIUM CHLORIDE 0.9 % (FLUSH) 0.9 %
5-40 SYRINGE (ML) INJECTION PRN
Status: DISCONTINUED | OUTPATIENT
Start: 2022-12-13 | End: 2022-12-14 | Stop reason: HOSPADM

## 2022-12-13 RX ORDER — ENOXAPARIN SODIUM 100 MG/ML
40 INJECTION SUBCUTANEOUS DAILY
Status: DISCONTINUED | OUTPATIENT
Start: 2022-12-14 | End: 2022-12-14 | Stop reason: HOSPADM

## 2022-12-13 RX ORDER — HEPARIN SODIUM AND DEXTROSE 10000; 5 [USP'U]/100ML; G/100ML
5-30 INJECTION INTRAVENOUS CONTINUOUS
Status: DISCONTINUED | OUTPATIENT
Start: 2022-12-13 | End: 2022-12-14

## 2022-12-13 RX ORDER — POLYETHYLENE GLYCOL 3350 17 G/17G
17 POWDER, FOR SOLUTION ORAL DAILY PRN
Status: DISCONTINUED | OUTPATIENT
Start: 2022-12-13 | End: 2022-12-14 | Stop reason: HOSPADM

## 2022-12-13 RX ORDER — HEPARIN SODIUM 1000 [USP'U]/ML
80 INJECTION, SOLUTION INTRAVENOUS; SUBCUTANEOUS ONCE
Status: COMPLETED | OUTPATIENT
Start: 2022-12-13 | End: 2022-12-13

## 2022-12-13 RX ORDER — HEPARIN SODIUM 1000 [USP'U]/ML
80 INJECTION, SOLUTION INTRAVENOUS; SUBCUTANEOUS PRN
Status: DISCONTINUED | OUTPATIENT
Start: 2022-12-13 | End: 2022-12-14

## 2022-12-13 RX ORDER — FENTANYL CITRATE 50 UG/ML
100 INJECTION, SOLUTION INTRAMUSCULAR; INTRAVENOUS ONCE
Status: COMPLETED | OUTPATIENT
Start: 2022-12-13 | End: 2022-12-13

## 2022-12-13 RX ORDER — PROPOFOL 10 MG/ML
INJECTION, EMULSION INTRAVENOUS
Status: DISPENSED
Start: 2022-12-13 | End: 2022-12-14

## 2022-12-13 RX ORDER — METHYLPREDNISOLONE SODIUM SUCCINATE 125 MG/2ML
40 INJECTION, POWDER, LYOPHILIZED, FOR SOLUTION INTRAMUSCULAR; INTRAVENOUS EVERY 4 HOURS
Status: COMPLETED | OUTPATIENT
Start: 2022-12-13 | End: 2022-12-13

## 2022-12-13 RX ORDER — HEPARIN SODIUM 1000 [USP'U]/ML
40 INJECTION, SOLUTION INTRAVENOUS; SUBCUTANEOUS PRN
Status: DISCONTINUED | OUTPATIENT
Start: 2022-12-13 | End: 2022-12-14

## 2022-12-13 RX ORDER — DEXMEDETOMIDINE HYDROCHLORIDE 4 UG/ML
.1-1.5 INJECTION, SOLUTION INTRAVENOUS CONTINUOUS
Status: DISCONTINUED | OUTPATIENT
Start: 2022-12-13 | End: 2022-12-13

## 2022-12-13 RX ORDER — DIPHENHYDRAMINE HYDROCHLORIDE 50 MG/ML
50 INJECTION INTRAMUSCULAR; INTRAVENOUS ONCE
Status: COMPLETED | OUTPATIENT
Start: 2022-12-13 | End: 2022-12-13

## 2022-12-13 RX ORDER — SODIUM CHLORIDE 9 MG/ML
INJECTION, SOLUTION INTRAVENOUS CONTINUOUS
Status: DISCONTINUED | OUTPATIENT
Start: 2022-12-13 | End: 2022-12-14 | Stop reason: HOSPADM

## 2022-12-13 RX ORDER — DIPHENHYDRAMINE HYDROCHLORIDE 50 MG/ML
50 INJECTION INTRAMUSCULAR; INTRAVENOUS EVERY 6 HOURS PRN
Status: DISCONTINUED | OUTPATIENT
Start: 2022-12-13 | End: 2022-12-14 | Stop reason: HOSPADM

## 2022-12-13 RX ORDER — MIDAZOLAM HYDROCHLORIDE 1 MG/ML
INJECTION INTRAMUSCULAR; INTRAVENOUS
Status: COMPLETED
Start: 2022-12-13 | End: 2022-12-13

## 2022-12-13 RX ORDER — MIDAZOLAM HYDROCHLORIDE 2 MG/2ML
1 INJECTION, SOLUTION INTRAMUSCULAR; INTRAVENOUS ONCE
Status: COMPLETED | OUTPATIENT
Start: 2022-12-13 | End: 2022-12-13

## 2022-12-13 RX ORDER — PROPOFOL 10 MG/ML
INJECTION, EMULSION INTRAVENOUS
Status: COMPLETED
Start: 2022-12-13 | End: 2022-12-13

## 2022-12-13 RX ORDER — DIPHENHYDRAMINE HYDROCHLORIDE 50 MG/ML
INJECTION INTRAMUSCULAR; INTRAVENOUS
Status: COMPLETED
Start: 2022-12-13 | End: 2022-12-13

## 2022-12-13 RX ORDER — ACETAMINOPHEN 325 MG/1
650 TABLET ORAL EVERY 6 HOURS PRN
Status: DISCONTINUED | OUTPATIENT
Start: 2022-12-13 | End: 2022-12-14 | Stop reason: HOSPADM

## 2022-12-13 RX ORDER — FENTANYL CITRATE 50 UG/ML
INJECTION, SOLUTION INTRAMUSCULAR; INTRAVENOUS
Status: COMPLETED
Start: 2022-12-13 | End: 2022-12-13

## 2022-12-13 RX ORDER — MIDAZOLAM HYDROCHLORIDE 2 MG/2ML
2 INJECTION, SOLUTION INTRAMUSCULAR; INTRAVENOUS ONCE
Status: COMPLETED | OUTPATIENT
Start: 2022-12-13 | End: 2022-12-13

## 2022-12-13 RX ORDER — PROPOFOL 10 MG/ML
5-50 INJECTION, EMULSION INTRAVENOUS CONTINUOUS
Status: DISCONTINUED | OUTPATIENT
Start: 2022-12-13 | End: 2022-12-14

## 2022-12-13 RX ADMIN — MIDAZOLAM 2 MG: 1 INJECTION INTRAMUSCULAR; INTRAVENOUS at 12:45

## 2022-12-13 RX ADMIN — MIDAZOLAM 2 MG: 1 INJECTION INTRAMUSCULAR; INTRAVENOUS at 22:58

## 2022-12-13 RX ADMIN — MIDAZOLAM 1 MG: 1 INJECTION INTRAMUSCULAR; INTRAVENOUS at 14:23

## 2022-12-13 RX ADMIN — DEXMEDETOMIDINE HYDROCHLORIDE 0.2 MCG/KG/HR: 4 INJECTION, SOLUTION INTRAVENOUS at 14:59

## 2022-12-13 RX ADMIN — DIPHENHYDRAMINE HYDROCHLORIDE 50 MG: 50 INJECTION INTRAMUSCULAR; INTRAVENOUS at 12:45

## 2022-12-13 RX ADMIN — IOPAMIDOL 75 ML: 755 INJECTION, SOLUTION INTRAVENOUS at 18:41

## 2022-12-13 RX ADMIN — HEPARIN SODIUM 18 UNITS/KG/HR: 10000 INJECTION, SOLUTION INTRAVENOUS at 15:45

## 2022-12-13 RX ADMIN — MIDAZOLAM HYDROCHLORIDE 2 MG: 2 INJECTION, SOLUTION INTRAMUSCULAR; INTRAVENOUS at 12:45

## 2022-12-13 RX ADMIN — DIPHENHYDRAMINE HYDROCHLORIDE 50 MG: 50 INJECTION, SOLUTION INTRAMUSCULAR; INTRAVENOUS at 12:45

## 2022-12-13 RX ADMIN — FENTANYL CITRATE 100 MCG: 50 INJECTION, SOLUTION INTRAMUSCULAR; INTRAVENOUS at 16:55

## 2022-12-13 RX ADMIN — PROPOFOL 20 MCG/KG/MIN: 10 INJECTION, EMULSION INTRAVENOUS at 16:43

## 2022-12-13 RX ADMIN — SODIUM CHLORIDE: 9 INJECTION, SOLUTION INTRAVENOUS at 22:41

## 2022-12-13 RX ADMIN — HEPARIN SODIUM 7260 UNITS: 1000 INJECTION INTRAVENOUS; SUBCUTANEOUS at 15:38

## 2022-12-13 RX ADMIN — METHYLPREDNISOLONE SODIUM SUCCINATE 40 MG: 125 INJECTION, POWDER, FOR SOLUTION INTRAMUSCULAR; INTRAVENOUS at 13:49

## 2022-12-13 RX ADMIN — PIPERACILLIN AND TAZOBACTAM 3375 MG: 3; .375 INJECTION, POWDER, LYOPHILIZED, FOR SOLUTION INTRAVENOUS at 18:35

## 2022-12-13 RX ADMIN — DIPHENHYDRAMINE HYDROCHLORIDE 50 MG: 50 INJECTION, SOLUTION INTRAMUSCULAR; INTRAVENOUS at 15:35

## 2022-12-13 RX ADMIN — METHYLPREDNISOLONE SODIUM SUCCINATE 40 MG: 125 INJECTION, POWDER, FOR SOLUTION INTRAMUSCULAR; INTRAVENOUS at 17:38

## 2022-12-13 RX ADMIN — MIDAZOLAM HYDROCHLORIDE 1 MG: 1 INJECTION, SOLUTION INTRAMUSCULAR; INTRAVENOUS at 14:58

## 2022-12-13 ASSESSMENT — PULMONARY FUNCTION TESTS
PIF_VALUE: 19
PIF_VALUE: 21

## 2022-12-13 ASSESSMENT — PAIN DESCRIPTION - LOCATION: LOCATION: NECK

## 2022-12-13 ASSESSMENT — PAIN DESCRIPTION - PAIN TYPE: TYPE: ACUTE PAIN

## 2022-12-13 ASSESSMENT — PAIN SCALES - GENERAL: PAINLEVEL_OUTOF10: 10

## 2022-12-13 ASSESSMENT — PAIN - FUNCTIONAL ASSESSMENT: PAIN_FUNCTIONAL_ASSESSMENT: 0-10
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
## 2022-12-13 NOTE — PROGRESS NOTES
Charting intubations and reintubations    ETT Size : 7.5  ETT Placement : 25 @ teeth  ETT secured with : Nelson    Tube placement verified by:  Auscultation : yes  Positive color change on end tidal CO2 detector : yes  CXR : ordered    Reason for intubation : airway protection  Performed by: Dr. Munoz Mom    -- Notify charge therapist regarding all intubations, extubations, reintubations and self extubations    Shalini Osuna RCP  4:58 PM
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
## 2022-12-13 NOTE — ED NOTES
1mg IV Versed given per verbal order from Dr. Valerie Hammond due to patient becoming aggressive again.      Michelle Blunt, RN  12/13/22 [address] [place] [address], RN  12/13/22 8250

## 2022-12-13 NOTE — ED NOTES
Soft restraints ordered by Dr. Ramon Tolentino to protect patient's breathing tube     Pita Gregg RN  12/13/22 1800

## 2022-12-13 NOTE — ED PROVIDER NOTES
[facility]  ED  Emergency Department Encounter  Emergency Medicine Resident     Pt Name: Wilder Ganser  [id number]  [place] 1990  Date of evaluation: 12/13/22  PCP:  Katlyn Wyatt MD    CHIEF COMPLAINT       No chief complaint on file. HISTORY OF PRESENT ILLNESS  (Location/Symptom, Timing/Onset, Context/Setting, Quality, Duration, ModifyingFactors, Severity.)      Wilder Ganser is a 28 y.o. male with PMH of ***     PAST MEDICAL / SURGICAL / SOCIAL /FAMILY HISTORY      has a past medical history of Anxiety, Arthritis, Asthma, Bipolar I disorder, most recent episode (or current) depressed, unspecified, Clostridium difficile infection, COPD (chronic obstructive pulmonary disease) ([address] Depression, Disease of blood and blood forming organ, Eczema, Fracture, metacarpal, Gastric ulcer, Gastritis, GERD (gastroesophageal reflux disease), GI bleed, H. pylori infection, H/O blood clots, Head injury, Headache, Insomnia, Juvenile rheumatoid arthritis ([address] Neuromuscular disorder (HCC), PFAPA syndrome ([address] PUD (peptic ulcer disease), Rheumatoid arthritis ([address] Rheumatoid arthritis(714.0), Severe recurrent major depression without psychotic features ([address] Sleep apnea, 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[address] Substance abuse ([address] Suicidal ideation, Suicide attempt by hanging ([address] Tobacco dependence, Ulcerative colitis ([address] and UTI (urinary tract infection). No other pertinent PMH on review with patient/guardian. has a past surgical history that includes Colonoscopy; bronchoscopy; other surgical history; Upper gastrointestinal endoscopy (2/4/16); pr egd transoral biopsy single/multiple (N/A, 3/20/2017); sigmoidoscopy (N/A, 3/20/2017); Cholecystectomy, laparoscopic (07/14/2017); pr esophagogastroduodenoscopy transoral diagnostic (N/A, 8/9/2017); pr colonoscopy w/biopsy single/multiple (8/9/2017); Abdomen surgery; Upper gastrointestinal endoscopy (N/A, 6/13/2018);  Upper gastrointestinal endoscopy (N/A, 9/20/2018); and Endoscopy, colon, diagnostic. No other pertinent PSH on review with patient/guardian. Social History     Socioeconomic History    Marital status: Single     Spouse name: Not on file    Number of children: Not on file    Years of education: Not on file    Highest education level: Not on file   Occupational History    Occupation: disability   Tobacco Use    Smoking status: Every Day     Packs/day: 0.50     Years: 15.00     Pack years: 7.50     Types: Cigarettes    Smokeless tobacco: Never   Vaping Use    Vaping Use: Former   Substance and Sexual Activity    Alcohol use: Not Currently     Comment: drinks daily    Drug use: Not Currently     Types: Opiates , Cocaine     Comment: Hx of opiates, benzos, cocaine, alcohol abuse    Sexual activity: Yes     Partners: Female     Comment: Lives alone, not working. Other Topics Concern    Not on file   Social History Narrative    ** Merged History Encounter **          Social Determinants of Health     Financial Resource Strain: Not on file   Food Insecurity: Not on file   Transportation Needs: Not on file   Physical Activity: Not on file   Stress: Not on file   Social Connections: Not on file   Intimate Partner Violence: Not on file   Housing Stability: Not on file       I counseled the patient against using tobacco products.     Family History   Problem Relation Age of Onset    Diabetes Father     Alcohol Abuse Father     Depression Father     Arthritis Father     High Blood Pressure Father     Other Father         aneurysm & epilepsy    Migraines Father     Arthritis Mother     Other Mother         aneurysm & epilepsy    Migraines Mother     Diabetes Brother         Aunt and uncles    Depression Brother     Mental Illness Brother     Other Brother         epilepsy    Migraines Brother     Stroke Other         Uncle    Other Brother         murdered Oct 6th, 2014    Colon Cancer Paternal Cousin 43    Other Sister         epilepsy   Bramwell Darting Migraines Sister      No other pertinent FamHx on review with patient/guardian. Allergies:  Dicyclomine, Famotidine, Geodon [ziprasidone hcl], Haloperidol, Iv dye [iodides], Reglan [metoclopramide], Ibuprofen, Aspirin, Dicyclomine hcl, Hydroxyzine hcl, Iodine, Metronidazole, Mirtazapine, Promethazine, and Ziprasidone    Home Medications:  Prior to Admission medications    Medication Sig Start Date End Date Taking? Authorizing Provider   ALPRAZolam Rex Avinger) 0.5 MG tablet  9/4/22   Historical Provider, MD   QUEtiapine (SEROQUEL) 100 MG tablet  10/31/22   Historical Provider, MD   tiZANidine (ZANAFLEX) 4 MG tablet Take 1 tablet by mouth every 6 hours as needed (muscle pain) 12/4/22   Heidy Benjamin MD   hydrocortisone 1 % cream Apply topically 2 -3 times daily for 5 - 7 days. 12/4/22   Heidy Benjamin MD   cloNIDine (CATAPRES) 0.1 MG tablet Take 1 tablet by mouth nightly for 3 days 7/14/22 7/17/22  MASSIEL Hyde - CNP   Zinc Oxide 6 % CREA Apply 1 applicator topically in the morning and at bedtime 7/3/22   CHER Paris MD   ARIPiprazole (ABILIFY) 15 MG tablet Take 1 tablet by mouth daily 6/22/22   Rhonda Hoskins MD   cetirizine (ZYRTEC) 10 MG tablet Take 1 tablet by mouth daily 6/22/22   Rhonda Hoskins MD   meloxicam (MOBIC) 7.5 MG tablet Take 1 tablet by mouth 2 times daily as needed for Pain 8/19/21 10/30/21  MASSIEL Foster - CNP       REVIEW OF SYSTEMS    (2-9 systems for level 4, 10 ormore for level 5)      Review of Systems    PHYSICAL EXAM   (up to 7 for level 4, 8 or more for level 5)      INITIAL VITALS:   There were no vitals taken for this visit. Physical Exam    DIFFERENTIAL  DIAGNOSIS     PLAN (LABS / IMAGING / EKG):  No orders of the defined types were placed in this encounter. MEDICATIONS ORDERED:  No orders of the defined types were placed in this encounter.       DIAGNOSTIC RESULTS / EMERGENCY DEPARTMENT COURSE / MDM     LABS:  No results found for this visit on 12/13/22. IMPRESSION/MDM/ED COURSE:  28 y.o. male presented with ***    I did not order a pregnancy test because ***           RADIOLOGY:  No orders to display       EKG  None***    All EKG's are interpreted by the Emergency Department Physician who either signs or Co-signs this chart in the absence of a cardiologist.    PROCEDURES:  None***    CONSULTS:  None    FINAL IMPRESSION      No diagnosis found. DISPOSITION / PLAN     DISPOSITION        PATIENT REFERREDTO:  No follow-up provider specified.     DISCHARGE MEDICATIONS:  New Prescriptions    No medications on file       Tata Albararn DO  PGY 3  Resident Physician Emergency Medicine  12/13/22 11:54 AM    (Please note that portions of this note were completed with a voice recognition program.Efforts were made to edit the dictations but occasionally words are mis-transcribed.)

## 2022-12-13 NOTE — ED NOTES
50mg IM Benadryl given per verbal order from Dr. Nimisha Flores  2mg IM Versed given per verbal order from Dr. Indra AVILA at bedside to help with med assist     Angelica Lee RN  [phone]

## 2022-12-13 NOTE — ED NOTES
Violent restraints discontinued due to patient being intubated and sedated     Zak Marie RN  12/13/22 1800

## 2022-12-13 NOTE — ED NOTES
Per Dr. Rex Atkins patient will be intubated due to respiratory compromise  Dr. Rex Atkins, Dr. Prabhakar Garrido, RT, Pharmacist at bedside     Don Pinzon, [address] [place] [address]  12/13/22 [id number]
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
## 2022-12-13 NOTE — ED NOTES
Patient is now resting comfortably in stretcher. [employer] at bedside. Patient placed on non-re-breather mask due to patient being hypoxic. Patient tolerating NRB mask at this time. IV access established and blood specimens collected.       Pita Gregg RN  12/13/22 0625
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
## 2022-12-13 NOTE — PROGRESS NOTES
[address] [facility] Pharmacokinetic Monitoring Service - Vancomycin     Chloe Brown is a 28 y.o. male starting on vancomycin therapy for sepsis. Pharmacy consulted by Konrad Bowers for monitoring and adjustment. Target Concentration: Goal AUC/CLARE 400-600 mg*hr/L    Additional Antimicrobials: zosyn    Pertinent Laboratory Values: Wt Readings from Last 1 Encounters:   12/13/22 200 lb (90.7 kg)     Temp Readings from Last 1 Encounters:   12/13/22 98.1 °F (36.7 °C) (Oral)     Estimated Creatinine Clearance: 92 mL/min (A) (based on SCr of 1.24 mg/dL (H)). Recent Labs     12/13/22  1306 12/13/22  1308   CREATININE 1.24*  --    WBC  --  12.1*     Procalcitonin: N/A    Pertinent Cultures:  Culture Date Source Results   12/13 Blood Pending   MRSA Nasal Sw/ab: N/A. Non-respiratory infection.     Plan:  Dosing recommendations based on Bayesian software  Start vancomycin 1000 mg IVPB q8h  Anticipated AUC of 420 and trough concentration of 14 at steady state  Renal labs as indicated   Vancomycin concentration not ordered yet  Pharmacy will continue to monitor patient and adjust therapy as indicated    Thank you for the consult,  EFREN Broderick [facility]  12/13/2022 6:22 PM

## 2022-12-13 NOTE — ED NOTES
20mg Etomidate given IV  100 mg Succinylcholine given IV per verbal order Dr. Jenny Ponce, RN  12/13/22 1734

## 2022-12-14 VITALS
BODY MASS INDEX: 30.28 KG/M2 | DIASTOLIC BLOOD PRESSURE: 80 MMHG | WEIGHT: 192.9 LBS | HEART RATE: 100 BPM | RESPIRATION RATE: 17 BRPM | HEIGHT: 67 IN | SYSTOLIC BLOOD PRESSURE: 134 MMHG | OXYGEN SATURATION: 96 % | TEMPERATURE: 98.6 F

## 2022-12-14 LAB
ABSOLUTE EOS #: 0 K/UL (ref 0–0.44)
ABSOLUTE IMMATURE GRANULOCYTE: 0 K/UL (ref 0–0.3)
ABSOLUTE LYMPH #: 0.62 K/UL (ref 1.1–3.7)
ABSOLUTE MONO #: 0.18 K/UL (ref 0.1–1.2)
ADENOVIRUS PCR: NOT DETECTED
ALLEN TEST: POSITIVE
ANION GAP SERPL CALCULATED.3IONS-SCNC: 11 MMOL/L (ref 9–17)
BASOPHILS # BLD: 0 % (ref 0–2)
BASOPHILS ABSOLUTE: 0 K/UL (ref 0–0.2)
BORDETELLA PARAPERTUSSIS: NOT DETECTED
BORDETELLA PERTUSSIS PCR: NOT DETECTED
BUN BLDV-MCNC: 15 MG/DL (ref 6–20)
CALCIUM SERPL-MCNC: 8.5 MG/DL (ref 8.6–10.4)
CHLAMYDIA PNEUMONIAE BY PCR: NOT DETECTED
CHLORIDE BLD-SCNC: 99 MMOL/L (ref 98–107)
CO2: 26 MMOL/L (ref 20–31)
CORONAVIRUS 229E PCR: NOT DETECTED
CORONAVIRUS HKU1 PCR: NOT DETECTED
CORONAVIRUS NL63 PCR: NOT DETECTED
CORONAVIRUS OC43 PCR: NOT DETECTED
CREAT SERPL-MCNC: 0.82 MG/DL (ref 0.7–1.2)
EKG ATRIAL RATE: 108 BPM
EKG ATRIAL RATE: 98 BPM
EKG P AXIS: 47 DEGREES
EKG P AXIS: 87 DEGREES
EKG P-R INTERVAL: 132 MS
EKG P-R INTERVAL: 132 MS
EKG Q-T INTERVAL: 344 MS
EKG Q-T INTERVAL: 384 MS
EKG QRS DURATION: 68 MS
EKG QRS DURATION: 70 MS
EKG QTC CALCULATION (BAZETT): 460 MS
EKG QTC CALCULATION (BAZETT): 490 MS
EKG R AXIS: 40 DEGREES
EKG R AXIS: 52 DEGREES
EKG T AXIS: 22 DEGREES
EKG T AXIS: 28 DEGREES
EKG VENTRICULAR RATE: 108 BPM
EKG VENTRICULAR RATE: 98 BPM
EOSINOPHILS RELATIVE PERCENT: 0 % (ref 1–4)
FIO2: 35
GFR SERPL CREATININE-BSD FRML MDRD: >60 ML/MIN/1.73M2
GLUCOSE BLD-MCNC: 121 MG/DL (ref 75–110)
GLUCOSE BLD-MCNC: 135 MG/DL (ref 70–99)
GLUCOSE BLD-MCNC: 137 MG/DL (ref 74–100)
HCT VFR BLD CALC: 37.5 % (ref 40.7–50.3)
HEMOGLOBIN: 12.2 G/DL (ref 13–17)
HUMAN METAPNEUMOVIRUS PCR: NOT DETECTED
IMMATURE GRANULOCYTES: 0 %
INFLUENZA A BY PCR: NOT DETECTED
INFLUENZA B BY PCR: NOT DETECTED
LEGIONELLA PNEUMOPHILIA AG, URINE: NEGATIVE
LYMPHOCYTES # BLD: 7 % (ref 24–43)
MAGNESIUM: 2.1 MG/DL (ref 1.6–2.6)
MCH RBC QN AUTO: 29.9 PG (ref 25.2–33.5)
MCHC RBC AUTO-ENTMCNC: 32.5 G/DL (ref 28.4–34.8)
MCV RBC AUTO: 91.9 FL (ref 82.6–102.9)
MONOCYTES # BLD: 2 % (ref 3–12)
MORPHOLOGY: NORMAL
MRSA, DNA, NASAL: NEGATIVE
MYCOPLASMA PNEUMONIAE IGM: 0.07
MYCOPLASMA PNEUMONIAE PCR: NOT DETECTED
NRBC AUTOMATED: 0 PER 100 WBC
PARAINFLUENZA 1 PCR: NOT DETECTED
PARAINFLUENZA 2 PCR: NOT DETECTED
PARAINFLUENZA 3 PCR: NOT DETECTED
PARAINFLUENZA 4 PCR: NOT DETECTED
PARTIAL THROMBOPLASTIN TIME: 24.6 SEC (ref 20.5–30.5)
PARTIAL THROMBOPLASTIN TIME: 56.4 SEC (ref 20.5–30.5)
PDW BLD-RTO: 12.6 % (ref 11.8–14.4)
PHOSPHORUS: 1.7 MG/DL (ref 2.5–4.5)
PLATELET # BLD: 281 K/UL (ref 138–453)
PMV BLD AUTO: 10.2 FL (ref 8.1–13.5)
POC HCO3: 31.4 MMOL/L (ref 21–28)
POC O2 SATURATION: 99 % (ref 94–98)
POC PCO2: 39.4 MM HG (ref 35–48)
POC PH: 7.51 (ref 7.35–7.45)
POC PO2: 105.6 MM HG (ref 83–108)
POSITIVE BASE EXCESS, ART: 8 (ref 0–3)
POTASSIUM SERPL-SCNC: 3.8 MMOL/L (ref 3.7–5.3)
RBC # BLD: 4.08 M/UL (ref 4.21–5.77)
REASON FOR REJECTION: NORMAL
RESP SYNCYTIAL VIRUS PCR: NOT DETECTED
RHINO/ENTEROVIRUS PCR: NOT DETECTED
SAMPLE SITE: ABNORMAL
SARS-COV-2, PCR: NOT DETECTED
SEG NEUTROPHILS: 91 % (ref 36–65)
SEGMENTED NEUTROPHILS ABSOLUTE COUNT: 8.1 K/UL (ref 1.5–8.1)
SODIUM BLD-SCNC: 136 MMOL/L (ref 135–144)
SOURCE: NORMAL
SPECIMEN DESCRIPTION: NORMAL
SPECIMEN DESCRIPTION: NORMAL
STREP PNEUMONIAE ANTIGEN: NEGATIVE
TROPONIN, HIGH SENSITIVITY: 34 NG/L (ref 0–22)
TROPONIN, HIGH SENSITIVITY: 34 NG/L (ref 0–22)
TROPONIN, HIGH SENSITIVITY: 45 NG/L (ref 0–22)
WBC # BLD: 8.9 K/UL (ref 3.5–11.3)
ZZ NTE CLEAN UP: ORDERED TEST: NORMAL
ZZ NTE WITH NAME CLEAN UP: SPECIMEN SOURCE: NORMAL

## 2022-12-14 PROCEDURE — 2580000003 HC RX 258

## 2022-12-14 PROCEDURE — 6360000002 HC RX W HCPCS

## 2022-12-14 PROCEDURE — 80048 BASIC METABOLIC PNL TOTAL CA: CPT

## 2022-12-14 PROCEDURE — 2580000003 HC RX 258: Performed by: HEALTH CARE PROVIDER

## 2022-12-14 PROCEDURE — 84100 ASSAY OF PHOSPHORUS: CPT

## 2022-12-14 PROCEDURE — 82947 ASSAY GLUCOSE BLOOD QUANT: CPT

## 2022-12-14 PROCEDURE — 84484 ASSAY OF TROPONIN QUANT: CPT

## 2022-12-14 PROCEDURE — 99291 CRITICAL CARE FIRST HOUR: CPT | Performed by: INTERNAL MEDICINE

## 2022-12-14 PROCEDURE — 93010 ELECTROCARDIOGRAM REPORT: CPT | Performed by: INTERNAL MEDICINE

## 2022-12-14 PROCEDURE — 83735 ASSAY OF MAGNESIUM: CPT

## 2022-12-14 PROCEDURE — 6370000000 HC RX 637 (ALT 250 FOR IP)

## 2022-12-14 PROCEDURE — 94003 VENT MGMT INPAT SUBQ DAY: CPT

## 2022-12-14 PROCEDURE — 6360000002 HC RX W HCPCS: Performed by: HEALTH CARE PROVIDER

## 2022-12-14 PROCEDURE — 36415 COLL VENOUS BLD VENIPUNCTURE: CPT

## 2022-12-14 PROCEDURE — 36600 WITHDRAWAL OF ARTERIAL BLOOD: CPT

## 2022-12-14 PROCEDURE — 82803 BLOOD GASES ANY COMBINATION: CPT

## 2022-12-14 PROCEDURE — 2700000000 HC OXYGEN THERAPY PER DAY

## 2022-12-14 PROCEDURE — 85730 THROMBOPLASTIN TIME PARTIAL: CPT

## 2022-12-14 PROCEDURE — 85025 COMPLETE CBC W/AUTO DIFF WBC: CPT

## 2022-12-14 PROCEDURE — 0202U NFCT DS 22 TRGT SARS-COV-2: CPT

## 2022-12-14 RX ORDER — LEVOFLOXACIN 5 MG/ML
750 INJECTION, SOLUTION INTRAVENOUS EVERY 24 HOURS
Status: DISCONTINUED | OUTPATIENT
Start: 2022-12-14 | End: 2022-12-14 | Stop reason: HOSPADM

## 2022-12-14 RX ORDER — FENTANYL CITRATE 50 UG/ML
100 INJECTION, SOLUTION INTRAMUSCULAR; INTRAVENOUS
Status: DISCONTINUED | OUTPATIENT
Start: 2022-12-14 | End: 2022-12-14 | Stop reason: HOSPADM

## 2022-12-14 RX ORDER — PROPOFOL 10 MG/ML
INJECTION, EMULSION INTRAVENOUS
Status: COMPLETED
Start: 2022-12-14 | End: 2022-12-14

## 2022-12-14 RX ORDER — OXYCODONE HYDROCHLORIDE 5 MG/1
5 TABLET ORAL EVERY 4 HOURS PRN
Status: DISCONTINUED | OUTPATIENT
Start: 2022-12-14 | End: 2022-12-14 | Stop reason: HOSPADM

## 2022-12-14 RX ADMIN — LEVOFLOXACIN 750 MG: 5 INJECTION, SOLUTION INTRAVENOUS at 12:04

## 2022-12-14 RX ADMIN — PIPERACILLIN AND TAZOBACTAM 3375 MG: 3; .375 INJECTION, POWDER, LYOPHILIZED, FOR SOLUTION INTRAVENOUS at 09:44

## 2022-12-14 RX ADMIN — Medication 1000 MG: at 03:08

## 2022-12-14 RX ADMIN — FENTANYL CITRATE 100 MCG: 50 INJECTION, SOLUTION INTRAMUSCULAR; INTRAVENOUS at 10:42

## 2022-12-14 RX ADMIN — PROPOFOL 50 MCG/KG/MIN: 10 INJECTION, EMULSION INTRAVENOUS at 06:42

## 2022-12-14 RX ADMIN — PROPOFOL 50 MCG/KG/MIN: 10 INJECTION, EMULSION INTRAVENOUS at 00:16

## 2022-12-14 RX ADMIN — ONDANSETRON 4 MG: 2 INJECTION INTRAMUSCULAR; INTRAVENOUS at 09:55

## 2022-12-14 RX ADMIN — SODIUM CHLORIDE, PRESERVATIVE FREE 10 ML: 5 INJECTION INTRAVENOUS at 08:06

## 2022-12-14 RX ADMIN — Medication 1000 MG: at 11:56

## 2022-12-14 RX ADMIN — PROPOFOL 50 MCG/KG/MIN: 10 INJECTION, EMULSION INTRAVENOUS at 02:55

## 2022-12-14 RX ADMIN — PROPOFOL 50 MCG/KG/MIN: 10 INJECTION, EMULSION INTRAVENOUS at 14:26

## 2022-12-14 RX ADMIN — DIBASIC SODIUM PHOSPHATE, MONOBASIC POTASSIUM PHOSPHATE AND MONOBASIC SODIUM PHOSPHATE 1 TABLET: 852; 155; 130 TABLET ORAL at 08:08

## 2022-12-14 RX ADMIN — PIPERACILLIN AND TAZOBACTAM 3375 MG: 3; .375 INJECTION, POWDER, LYOPHILIZED, FOR SOLUTION INTRAVENOUS at 03:07

## 2022-12-14 RX ADMIN — MIDAZOLAM 2 MG: 1 INJECTION INTRAMUSCULAR; INTRAVENOUS at 02:33

## 2022-12-14 RX ADMIN — FENTANYL CITRATE 100 MCG: 50 INJECTION, SOLUTION INTRAMUSCULAR; INTRAVENOUS at 14:26

## 2022-12-14 RX ADMIN — MIDAZOLAM 2 MG: 1 INJECTION INTRAMUSCULAR; INTRAVENOUS at 08:05

## 2022-12-14 ASSESSMENT — PULMONARY FUNCTION TESTS
PIF_VALUE: 18
PIF_VALUE: 23
PIF_VALUE: 18
PIF_VALUE: 18
PIF_VALUE: 21
PIF_VALUE: 18
PIF_VALUE: 18
PIF_VALUE: 19
PIF_VALUE: 27
PIF_VALUE: 21
PIF_VALUE: 13
PIF_VALUE: 18
PIF_VALUE: 18

## 2022-12-14 ASSESSMENT — PAIN DESCRIPTION - ONSET
ONSET: SUDDEN
ONSET: PROGRESSIVE

## 2022-12-14 ASSESSMENT — PAIN DESCRIPTION - DESCRIPTORS
DESCRIPTORS: ACHING

## 2022-12-14 ASSESSMENT — PAIN DESCRIPTION - FREQUENCY: FREQUENCY: CONTINUOUS

## 2022-12-14 ASSESSMENT — PAIN SCALES - GENERAL
PAINLEVEL_OUTOF10: 8
PAINLEVEL_OUTOF10: 7
PAINLEVEL_OUTOF10: 4
PAINLEVEL_OUTOF10: 10

## 2022-12-14 ASSESSMENT — PAIN DESCRIPTION - LOCATION
LOCATION: GENERALIZED
LOCATION: GENERALIZED;LEG
LOCATION: GENERALIZED

## 2022-12-14 ASSESSMENT — PAIN - FUNCTIONAL ASSESSMENT: PAIN_FUNCTIONAL_ASSESSMENT: ACTIVITIES ARE NOT PREVENTED

## 2022-12-14 NOTE — CARE COORDINATION
12/14/22 1740   Service Assessment   Patient Orientation Alert and Oriented   Cognition Alert   History Provided By Patient   Primary [address] Parent; Family Members   PCP Verified by CM Yes  Mehran Gee MD)   Last Visit to PCP Within last year   Prior Functional Level Independent in ADLs/IADLs   Current Functional Level Independent in ADLs/IADLs   Can patient return to prior living arrangement   (Homeless, now will be staying at his brother's house temporarily)   Ability to make needs known: Good   Financial Resources Medicare;Medicaid   CM/SW Referral Other (see comment)  (Homeless, substance abuse)   Social/Functional History   Lives With Other (comment)  (Homeless, plans to stay temporarily with brother)   ADL Assistance Independent   Ambulation Assistance Independent   Occupation On disability   Discharge Planning   Type of [address] Medications No   Services At/After Discharge   Mode of Transport at Discharge Other (see comment)  (brother)   Condition of Participation: Discharge Planning   The Patient and/or Patient Representative was provided with a Choice of Provider? Patient   The Patient and/Or Patient Representative agree with the Discharge Plan? No  (pt states he is going to leave with out discharge orders)    Case Management Assessment  Initial Evaluation    Date/Time of Evaluation: 12/14/2022 5:44 PM  Assessment Completed by: Ministerio Vizcarra RN    If patient is discharged prior to next notation, then this note serves as note for discharge by case management.     Patient Name: Elizabeth Daily                   YOB: 1990  Diagnosis: Delirium [R41.0]  Hypoxia [R09.02]  Elevated troponin [R77.8]  Acute respiratory failure with hypoxia ([facility] [address] [J96.01]  Cryptogenic organizing pneumonia [facility]) Willie Farris                   Date / Time: 12/13/2022 11:52 AM    Patient Admission Status: Inpatient   Readmission Risk (Low < 19, Mod (19-27), High > 27): Readmission Risk Score: 24    Current PCP: Sam Lawrence MD  PCP verified by CM? (P) Yes (Marietta Cordon MD)    Chart Reviewed: Yes      History Provided by: (P) Patient  Patient Orientation: (P) Alert and Oriented    Patient Cognition: (P) Alert    Hospitalization in the last 30 days (Readmission):  No    If yes, Readmission Assessment in  Navigator will be completed. Advance Directives:      Code Status: Full Code   Patient's Primary Decision Maker is:        Discharge Planning:    Patient lives with:   Type of Home: (P) Homeless  Primary Care Giver: (P) Self  Patient Support Systems include: (P) Parent, Family Members   Current Financial resources: (P) Medicare, Medicaid  Current community resources:    Current services prior to admission:              Current DME:              Type of Home Care services:       ADLS  Prior functional level: (P) Independent in ADLs/IADLs  Current functional level: (P) Independent in ADLs/IADLs    PT AM-PAC:   /24  OT AM-PAC:   /24    Family can provide assistance at DC: Would you like Case Management to discuss the discharge plan with any other family members/significant others, and if so, who?     Plans to Return to Present Housing: (P)  (Homeless, now will be staying at his brother's house temporarily)  Other Identified Issues/Barriers to RETURNING to current housing: needs testing  Potential Assistance needed at discharge:              Potential DME:    Patient expects to discharge to:    Plan for transportation at discharge:      Financial    Payor: [address] [place] [address] / Plan: MEDICARE PART A AND B / Product Type: *No Product type* /     Does insurance require precert for SNF: No    Potential assistance Purchasing Medications: (P) No  Meds-to-Beds request: Yes      [id number] [place], [place] - [address] [place] [address]  Phone: [phone] Fax: [phone]      Notes:    Factors facilitating achievement of predicted outcomes: none    Barriers to discharge: may need cardiac cath    Additional Case Management Notes: Extubated today. pt is homeless, will be staying with his brother temporarily. States he is going to leave AMA, physician informed. Has court date in [place] tomorrow- informed pt that we could call [place] courts tomorrow and inform them that he is in  hospital.   Brother's name is Rodri Cruz [phone], updated pt's emergency contacts. Brother's address is [address] [place] [address], [address] [place] [address]    The Plan for Transition of Care is related to the following treatment goals of Delirium [R41.0]  Hypoxia [R09.02]  Elevated troponin [R77.8]  Acute respiratory failure with hypoxia ([facility] [address] [J96.01]  Cryptogenic organizing pneumonia ([facility] [address] [Z49.554]    IF APPLICABLE: The Patient and/or patient representative zA Carlos and his family were provided with a choice of provider and agrees with the discharge plan. Freedom of choice list with basic dialogue that supports the patient's individualized plan of care/goals and shares the quality data associated with the providers was provided to: (P) Patient   Patient Representative Name:       The Patient and/or Patient Representative Agree with the Discharge Plan?  (P) No (pt states he is going to leave with out discharge orders)    Kam Carvalho RN  Case Management Department  Ph: [phone] Fax: [phone]
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
## 2022-12-14 NOTE — PLAN OF CARE
Problem: Respiratory - Adult  Goal: Achieves optimal ventilation and oxygenation  12/14/2022 0922 by Luba Garcias RCP  Flowsheets (Taken 12/14/2022 4764)  Achieves optimal ventilation and oxygenation:   Assess for changes in respiratory status   Position to facilitate oxygenation and minimize respiratory effort   Assess the need for suctioning and aspirate as needed   Respiratory therapy support as indicated   Oxygen supplementation based on oxygen saturation or arterial blood gases   Assess for changes in mentation and behavior

## 2022-12-14 NOTE — PLAN OF CARE
Patient wanted to be discharged to attend court tomorrow.  and myself explained that we can make a call to the court tomorrow and explained to them that he is here getting treated medically. Then patient did not want to stay he wanted to leave AMA explained to him risks of leaving AMA. Patient sounded understanding. Even after that patient wanted to leave AMA. AMA papers signed and witnessed by me and the RN taking care of the patient.       Jacqueline Pagan  PGY-2  Internal Medicine  [address]    5:45 [place] 12/14/2022

## 2022-12-14 NOTE — CARE COORDINATION
Extubated today. pt is homeless, will be staying with his brother temporarily. States he is going to leave AMA, physician informed. Has court date in [facility] tomorrow- informed pt that we could call [facility] courts tomorrow and inform them that he is in  hospital.   Brother's name is Sherwin Carias [phone], updated pt's emergency contacts. Brother's address is [address] [place] [address], [address] [place] [address] Drive [address]   consulted for Homelessness and substance abuse.

## 2022-12-14 NOTE — PROGRESS NOTES
Order obtained for extubation. SpO2 of 98 on 30% FiO2. Patient extubated and placed on 2 liters/min via nasal cannula. Post extubation SpO2 is 97%   Patient had strong cough that was productive of clear  sputum. Extubation Well tolerated by patient. , Physician notified. Casa Oswald RCP   5:24 PM

## 2022-12-14 NOTE — FLOWSHEET NOTE
Patient found stable, on Nasal Canula and off of PIV medications upon MY return from lunch  (restraints off per other RN, extubated per LIP order, with RRT assistance). MD called to bedside to discuss AMA upon request of patient. Patient educated by myself that he has orders from a doctor that will ease pain under professional observation, offered food, water, comfort, phone . Education ineffective. Patient signed AMA papers. All belongings returned (dead cell phone, two bank cards, one orange pair of half-way crocs, one pair of [employer]ebok tennis shoes, no pants, (1) patient gown, (1) fleece jacket, and court ordered paperwork ordering his appearance December 15th at [address] [place] [address]. He denies medical assistance in rescheduling this visit upon offering. Box lunch, candy canes, and airam mists taken with patient from hospital.     Patient told, you better show up in court tomorrow at [address] [place] [address], [address]. He replied cordially and was wheeled out in an agreeable state despite his decision to leave AMA.

## 2022-12-14 NOTE — CONSULTS
[facility]    Consult / H&P               Today's Date: 12/14/2022  Patient Name: Joanna Morataya  Date of admission: 12/13/2022 11:52 AM  Patient's age: 28 y. o., 1990  Admission Dx: Delirium [R41.0]  Hypoxia [R09.02]  Elevated troponin [R77.8]  Acute respiratory failure with hypoxia ([facility] [address] [J96.01]  Cryptogenic organizing pneumonia ([facility] [address] [H99.916]    Reason for Consult:  Cardiac evaluation    Requesting Physician: Erin Key MD    CHIEF COMPLAINT: Acute metabolic encephalopathy    History Obtained From:  chart review   HISTORY OF PRESENT ILLNESS:      The patient is a 28 y.o. male with past medical history of hypertension, diabetes, polysubstance abuse including cocaine,  Presented via ED from prison, was altered aggressive. Per chart review patient was hypoxic in the ED was placed on nasal cannula and eventually on nonrebreather. In the ED patient end-tidal was 66 and VBG was showing respiratory acidosis and decision was made to intubate the patient because respiratory rate was going down. In the ED patient blood pressure was 178/85 initially, tachycardia with 108,     Serum electrolyte unremarkable with a touch of PHI.   proBNP 3.4K tropes trending 99-45 -34-34. EKG was showing sinus tachycardia. According to the dispense report his recent medications include Xanax, Abilify, Suboxone, duloxetine, Seroquel, clonidine, tizanidine, trazodone    Cardiology was consulted for NSTEMI and high proBNP. On my evaluation patient was intubated and sedated.     Past Medical History:   has a past medical history of Anxiety, Arthritis, Asthma, Bipolar I disorder, most recent episode (or current) depressed, unspecified, Clostridium difficile infection, COPD (chronic obstructive pulmonary disease) ([facility] [address] Depression, Disease of blood and blood forming organ, Eczema, Fracture, metacarpal, Gastric ulcer, Gastritis, GERD (gastroesophageal reflux disease), GI bleed, H. pylori infection, H/O blood clots, Head injury, Headache, Insomnia, Juvenile rheumatoid arthritis ([facility] [address] Neuromuscular disorder (HCC), PFAPA syndrome ([facility] [address] PUD (peptic ulcer disease), Rheumatoid arthritis ([facility] [address] Rheumatoid arthritis(714.0), Severe recurrent major depression without psychotic features ([facility] [address] Sleep apnea, 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[facility] [address] Substance abuse ([facility] [address] Suicidal ideation, Suicide attempt by hanging ([facility] [address] Tobacco dependence, Ulcerative colitis ([facility] [address] and UTI (urinary tract infection). Past Surgical History:   has a past surgical history that includes Colonoscopy; bronchoscopy; other surgical history; Upper gastrointestinal endoscopy (2/4/16); pr egd transoral biopsy single/multiple (N/A, 3/20/2017); sigmoidoscopy (N/A, 3/20/2017); Cholecystectomy, laparoscopic (07/14/2017); pr esophagogastroduodenoscopy transoral diagnostic (N/A, 8/9/2017); pr colonoscopy w/biopsy single/multiple (8/9/2017); Abdomen surgery; Upper gastrointestinal endoscopy (N/A, 6/13/2018); Upper gastrointestinal endoscopy (N/A, 9/20/2018); and Endoscopy, colon, diagnostic. Home Medications:    Prior to Admission medications    Medication Sig Start Date End Date Taking? Authorizing Provider   ALPRAZolam Damariysabel Tavarez) 0.5 MG tablet  9/4/22   Historical Provider, MD   QUEtiapine (SEROQUEL) 100 MG tablet  10/31/22   Historical Provider, MD   tiZANidine (ZANAFLEX) 4 MG tablet Take 1 tablet by mouth every 6 hours as needed (muscle pain) 12/4/22   Nida Saini MD   hydrocortisone 1 % cream Apply topically 2 -3 times daily for 5 - 7 days.  12/4/22   Nida Saini MD   cloNIDine (CATAPRES) 0.1 MG tablet Take 1 tablet by mouth nightly for 3 days 7/14/22 7/17/22  Octavio Schumacher, APRN - CNP   Zinc Oxide 6 % CREA Apply 1 applicator topically in the morning and at bedtime 7/3/22   CHER Dunn MD   ARIPiprazole (ABILIFY) 15 MG tablet Take 1 tablet by mouth daily 6/22/22   Shyla Encarnacion MD   cetirizine (ZYRTEC) 10 MG tablet Take 1 tablet by mouth daily 6/22/22   Dave Singh MD   meloxicam (MOBIC) 7.5 MG tablet Take 1 tablet by mouth 2 times daily as needed for Pain 8/19/21 10/30/21  MASSIEL Siegel CNP          Allergies:  Dicyclomine, Famotidine, Geodon [ziprasidone hcl], Haloperidol, Iv dye [iodides], Reglan [metoclopramide], Ibuprofen, Aspirin, Dicyclomine hcl, Hydroxyzine hcl, Iodine, Metronidazole, Mirtazapine, Promethazine, and Ziprasidone    Social History:   reports that he has been smoking cigarettes. He has a 7.50 pack-year smoking history. He has never used smokeless tobacco. He reports that he does not currently use alcohol. He reports that he does not currently use drugs after having used the following drugs: Opiates  and Cocaine. Family History: family history includes Alcohol Abuse in his father; Arthritis in his father and mother; Colon Cancer (age of onset: 43) in his paternal cousin; Depression in his brother and father; Diabetes in his brother and father; High Blood Pressure in his father; Mental Illness in his brother; Migraines in his brother, father, mother, and sister; Other in his brother, brother, father, mother, and sister; Stroke in an other family member. No h/o sudden cardiac death. No for premature CAD    REVIEW OF SYSTEMS:    Constitutional: there has been no unanticipated weight loss. There's been No change in energy level, No change in activity level. Eyes: No visual changes or diplopia. No scleral icterus. ENT: No Headaches  Cardiovascular: as above  cardiac history  Respiratory: No previous pulmonary problems, No cough  Gastrointestinal: No abdominal pain. No change in bowel or bladder habits. Genitourinary: No dysuria, trouble voiding, or hematuria. Musculoskeletal:  No gait disturbance, No weakness or joint complaints. Integumentary: No rash or pruritis. Neurological: No headache, diplopia, change in muscle strength, numbness or tingling.  No change in gait, balance, coordination, mood, affect, memory, mentation, behavior. Psychiatric: No anxiety, or depression. Endocrine: No temperature intolerance. No excessive thirst, fluid intake, or urination. No tremor. Hematologic/Lymphatic: No abnormal bruising or bleeding, blood clots or swollen lymph nodes. Allergic/Immunologic: No nasal congestion or hives. PHYSICAL EXAM:      /74   Pulse 76   Temp 98.8 °F (37.1 °C) (Bladder)   Resp 14   Ht 5' 7\" (1.702 m)   Wt 192 lb 14.4 oz (87.5 kg)   SpO2 96%   BMI 30.21 kg/m²    Constitutional and General Appearance: alert, cooperative, no distress and appears stated age  HEENT: PERRL, no cervical lymphadenopathy. No masses palpable. Normal oral mucosa  Respiratory:  Normal excursion and expansion without use of accessory muscles  Resp Auscultation: Good respiratory effort. No for increased work of breathing. On auscultation: clear to auscultation bilaterally  Cardiovascular: The apical impulse is not displaced  Heart tones are crisp and normal. regular S1 and S2.  Jugular venous pulsation Normal  The carotid upstroke is normal in amplitude and contour without delay or bruit  Peripheral pulses are symmetrical and full   Abdomen:   No masses or tenderness  Bowel sounds present  Extremities:   No Cyanosis or Clubbing   Lower extremity edema: No   Skin: Warm and dry  Neurological:  Alert and oriented. Moves all extremities well  No abnormalities of mood, affect, memory, mentation, or behavior are noted    DATA:    Diagnostics:    EKG:  ECHO:   2016  Left Atrium  Left atrial volume index is normal.  Left Ventricle  Left ventricular systolic function is normal. Ejection fraction is estimated  at 55%. Myocardial band seen in LV. Right Atrium  Right atrium is normal in size. Right Ventricle  Normal right ventricular size and function. Mitral Valve  Normal mitral valve structure with trace regurgitation. Aortic Valve  Normal aortic valve structure and function without stenosis or  regurgitation.   Tricuspid Valve  Tricuspid valve is grossly normal with no regurgitation. Pulmonic Valve  Pulmonic valve not well visualized but Doppler velocities are normal.  Pericardial Effusion  No significant pericardial effusion is seen. Labs:     CBC:   Recent Labs     12/13/22  1308 12/14/22  0240   WBC 12.1* 8.9   HGB 11.6* 12.2*   HCT 35.5* 37.5*    281     BMP:   Recent Labs     12/13/22  1306 12/13/22  2348 12/14/22  0240     --  136   K 3.8  --  3.8   CO2 24  --  26   BUN 20  --  15   CREATININE 1.24* 0.91 0.82   LABGLOM >60  --  >60   GLUCOSE 94  --  135*     BNP: No results for input(s): BNP in the last 72 hours. PT/INR:   Recent Labs     12/13/22  1306   PROTIME 12.0   INR 1.1     APTT:  Recent Labs     12/13/22  2308 12/14/22  0658   APTT 66.2* 56.4*     CARDIAC ENZYMES:No results for input(s): CKTOTAL, CKMB, CKMBINDEX, TROPONINI in the last 72 hours.   FASTING LIPID PANEL:  Lab Results   Component Value Date/Time    HDL 44 02/21/2017 02:22 PM    TRIG 189 02/21/2017 02:22 PM     LIVER PROFILE:  Recent Labs     12/13/22  1306   *   *   LABALBU 3.8       IMPRESSION:    Patient Active Problem List   Diagnosis    Opioid abuse ([facility] [address]    Noncompliance    Rectal bleeding    Smoker    Juvenile rheumatoid arthritis (HCC)    SRIRAM (obstructive sleep apnea)    Primary insomnia    Calculus of bile duct with acute on chronic cholecystitis    Mild intermittent asthma without complication    Gastritis    Generalized abdominal pain    Anemia    Elevated liver enzymes    Nausea and vomiting    Opioid type dependence, continuous (HCC)    Abdominal pain    Diarrhea    Irritable bowel syndrome with both constipation and diarrhea    Schizoaffective disorder, bipolar type ([address]    GI bleed    Depression with suicidal ideation    Schizoaffective disorder ([facility] [address]    MDD (major depressive disorder), recurrent severe, without psychosis ([facility] [address]    Severe episode of recurrent major depressive disorder, without psychotic features ([facility] [address]    Diabetes mellitus type 2 in obese ([facility] [address]    Mixed hyperlipidemia    Cocaine abuse ([facility] [address]    Acute psychosis ([facility] [address]    PUD (peptic ulcer disease)    Gastroesophageal reflux disease without esophagitis    Prediabetes    Acute respiratory failure with hypoxia (HCC)       Assessment. Acute hypoxic resp failure  Acute systolic chf   NSTEMI type I versus type II. Polysubstance abuse including urine tox positive for cocaine, benzo,fentanyl. PHI with baseline creatinine normal.  Transaminitis    Plan:  Echo ordered. if low EF, will go ahead with cardiac cath. Avoid beta-blocker in the background of cocaine abuse. If necessarily to be given, start with Coreg low-dose. Keep potassium greater than 4, magnesium greater than 2   We will follow along      Attending Physician Statement  I have discussed the care of Sukumar Thorne, including pertinent history and exam findings,  with the cardiology fellow/resident. I have seen and examined the patient and the key elements of all parts of the encounter have been performed by me. I have completed at least one if not all key elements of the E/M (history, physical exam, and MDM). I agree with the assessment, plan and orders as documented by the resident with additional recommendations as below:     Acute encephalopathy, acute hypoxic resp failure. Polysubstance abuse including cocaine. Currently intubated and sedated. ECG sinus tach otherwise normal. Troponins minimally elevated consistent with type ii mi. Cont supportive care. Obtain echo.      Marshall Montes MD, [facility]

## 2022-12-14 NOTE — PLAN OF CARE
Problem: Respiratory - Adult  Goal: Achieves optimal ventilation and oxygenation  Outcome ongoing  Flowsheets (Taken 12/13/2022 2156)  Achieves optimal ventilation and oxygenation:   Assess for changes in respiratory status   Position to facilitate oxygenation and minimize respiratory effort   Assess the need for suctioning and aspirate as needed   Respiratory therapy support as indicated   Assess for changes in mentation and behavior   Oxygen supplementation based on oxygen saturation or arterial blood gases

## 2022-12-14 NOTE — PLAN OF CARE
Problem: Safety - Medical Restraint  Goal: Remains free of injury from restraints (Restraint for Interference with Medical Device)  Description: INTERVENTIONS:  1. Determine that other, less restrictive measures have been tried or would not be effective before applying the restraint  2. Evaluate the patient's condition at the time of restraint application  3. Inform patient/family regarding the reason for restraint  4.  Q2H: Monitor safety, psychosocial status, comfort, nutrition and hydration  12/14/2022 1121 by Evangelina Sam RN  Outcome: Progressing  Flowsheets  Taken 12/14/2022 0600 by Liana Tanner RN  Remains free of injury from restraints (restraint for interference with medical device): Every 2 hours: Monitor safety, psychosocial status, comfort, nutrition and hydration  Taken 12/14/2022 0400 by Liana Tanner RN  Remains free of injury from restraints (restraint for interference with medical device): Every 2 hours: Monitor safety, psychosocial status, comfort, nutrition and hydration  12/14/2022 0248 by Liana Tanner RN  Outcome: Progressing  Flowsheets  Taken 12/14/2022 0200  Remains free of injury from restraints (restraint for interference with medical device): Every 2 hours: Monitor safety, psychosocial status, comfort, nutrition and hydration  Taken 12/14/2022 0000  Remains free of injury from restraints (restraint for interference with medical device): Every 2 hours: Monitor safety, psychosocial status, comfort, nutrition and hydration  Taken 12/13/2022 2230  Remains free of injury from restraints (restraint for interference with medical device): Every 2 hours: Monitor safety, psychosocial status, comfort, nutrition and hydration  Taken 12/13/2022 2200  Remains free of injury from restraints (restraint for interference with medical device): Every 2 hours: Monitor safety, psychosocial status, comfort, nutrition and hydration     Problem: Respiratory - Adult  Goal: Achieves optimal ventilation and oxygenation  12/14/2022 1121 by Luis Eduardo Tejeda RN  Outcome: Progressing  12/14/2022 0922 by Talisha Xiong RCP  Flowsheets (Taken 12/14/2022 7912)  Achieves optimal ventilation and oxygenation:   Assess for changes in respiratory status   Position to facilitate oxygenation and minimize respiratory effort   Assess the need for suctioning and aspirate as needed   Respiratory therapy support as indicated   Oxygen supplementation based on oxygen saturation or arterial blood gases   Assess for changes in mentation and behavior  12/14/2022 0248 by Evelyn Iverson RN  Outcome: Progressing  12/13/2022 2156 by Rodrick Solis RCP  Flowsheets (Taken 12/13/2022 2156)  Achieves optimal ventilation and oxygenation:   Assess for changes in respiratory status   Position to facilitate oxygenation and minimize respiratory effort   Assess the need for suctioning and aspirate as needed   Respiratory therapy support as indicated   Assess for changes in mentation and behavior   Oxygen supplementation based on oxygen saturation or arterial blood gases     Problem: Discharge Planning  Goal: Discharge to home or other facility with appropriate resources  12/14/2022 1121 by Luis Eduardo Tejeda RN  Outcome: Progressing  12/14/2022 0248 by Evelyn Iverson RN  Outcome: Progressing     Problem: Pain  Goal: Verbalizes/displays adequate comfort level or baseline comfort level  12/14/2022 1121 by Luis Eduardo Tejeda RN  Outcome: Progressing  12/14/2022 0248 by Evelyn Iverson RN  Outcome: Progressing  Flowsheets (Taken 12/13/2022 2145)  Verbalizes/displays adequate comfort level or baseline comfort level: Assess pain using appropriate pain scale     Problem: Confusion  Goal: Confusion, delirium, dementia, or psychosis is improved or at baseline  Description: INTERVENTIONS:  1.  Assess for possible contributors to thought disturbance, including medications, impaired vision or hearing, underlying metabolic abnormalities, dehydration, psychiatric diagnoses, and notify attending LIP  2. [place] high risk fall precautions, as indicated  3. Provide frequent short contacts to provide reality reorientation, refocusing and direction  4. Decrease environmental stimuli, including noise as appropriate  5. Monitor and intervene to maintain adequate nutrition, hydration, elimination, sleep and activity  6. If unable to ensure safety without constant attention obtain sitter and review sitter guidelines with assigned personnel  7. Initiate Psychosocial CNS and Spiritual Care consult, as indicated  12/14/2022 1121 by Jonas Gregory RN  Outcome: Progressing  12/14/2022 0248 by Kwesi Ramírez RN  Outcome: Progressing     Problem: Skin/Tissue Integrity  Goal: Absence of new skin breakdown  Description: 1. Monitor for areas of redness and/or skin breakdown  2. Assess vascular access sites hourly  3. Every 4-6 hours minimum:  Change oxygen saturation probe site  4. Every 4-6 hours:  If on nasal continuous positive airway pressure, respiratory therapy assess nares and determine need for appliance change or resting period.   12/14/2022 1121 by Jonas Gregory RN  Outcome: Progressing  12/14/2022 0248 by Kwesi Ramírez RN  Outcome: Progressing     Problem: Safety - Adult  Goal: Free from fall injury  12/14/2022 1121 by Jonas Gregory RN  Outcome: Progressing  12/14/2022 0248 by Kwesi Ramírez RN  Outcome: Progressing     Problem: Chronic Conditions and Co-morbidities  Goal: Patient's chronic conditions and co-morbidity symptoms are monitored and maintained or improved  Outcome: Progressing

## 2022-12-14 NOTE — PROGRESS NOTES
Physician Progress Note      PATIENT:               Elizabeth Webster  CSN #:                  [id number]  :                       1990  ADMIT DATE:       2022 11:52 AM  DISCH DATE:  RESPONDING  PROVIDER #:        JOSE Arias [address]          QUERY TEXT:    Pt admitted with acute respiratory failure. Pt noted to have AMS documented. If possible, please document in the progress notes and discharge summary if   you are evaluating and / or treating any of the following: The medical record reflects the following:  Risk Factors: sepsis, hypoxia, +UDS  Clinical Indicators: presented with AMS, was yelling and combative with staff,   had to be put on four-point restraints, noted to be hypoxic with O2 sats in   the 70's and stridor, end-tidal was 66 and VBG showed respiratory acidosis,   per H&P sepsis of unknown source-possible aspiration pna, CT chest showed   Bilateral mainly ground-glass patchy infiltrates noted in bilat upper lobes,   UDS+ for cocaine, fentanyl, benzos  Treatment: intubation, mechanical ventilation, Precedex gtt, 4 point   restraints, CT's, ABG's, labs, cultures    Thank you, Adrian Junior, CDI  email - [email or web address]  cell- [phone]  office hours M-F-[id number]  Options provided:  -- Anoxic encephalopathy  -- Metabolic encephalopathy  -- Septic encephalopathy  -- Toxic encephalopathy  -- Toxic metabolic encephalopathy  -- Other - I will add my own diagnosis  -- Disagree - Not applicable / Not valid  -- Disagree - Clinically unable to determine / Unknown  -- Refer to Clinical Documentation Reviewer    PROVIDER RESPONSE TEXT:    This patient has toxic metabolic encephalopathy.     Query created by: Maged Fernandez on 2022 8:48 AM      Electronically signed by:  Socrates Nair 2022 3:08 PM

## 2022-12-14 NOTE — PROGRESS NOTES
Critical Care Team - Daily Progress Note      Date and time: 12/14/2022 7:17 AM  Patient's name:  Chloe Brown  Medical Record Number: [id number]  Patient's account/billing number: [de-identified]  Patient's YOB: 1990  Age: 28 y.o.   Date of Admission: 12/13/2022 11:52 AM  Length of stay during current admission: 1      Primary Care Physician: Jerica Keen MD  ICU Attending Physician: Dr. Nelson [facility] [place] Status: Full Code    Reason for ICU admission:   Chief Complaint   Patient presents with    Altered Mental Status       Subjective:     OVERNIGHT EVENTS:           No acute issues overnight  Patient seen and examined bedside  Has been afebrile, heart rate in the 70s, blood pressure stable    Vent settings -18/530/5/35  ABG - 7.4 3/44/82/20 9.6    Morning labs -   Sodium 136, potassium 3.8, creatinine 0.82, magnesium 2.1  WBC 8.9, hemoglobin 12.2, platelets 032    Troponin 124> 99> 34    proBNP 3460  Ammonia 32  TSH 0.4  Procal 0.67    Urine output 2.7 L    COVID rapid negative but respiratory panel pending      AWAKE & FOLLOWING COMMANDS:  [x] No   [] Yes    CURRENT VENTILATION STATUS:     [x] Ventilator  [] BIPAP  [] Nasal Cannula [] Room Air      IF INTUBATED, ET TUBE MARKING AT LOWER LIP:  25     cms    SECRETIONS Amount:  [x] Small [] Moderate  [] Large  [] None  Color:     [x] White [] Colored  [] Bloody    SEDATION:  RAAS Score:  [x] Propofol gtt  [] Versed gtt  [] Ativan gtt   [] No Sedation    PARALYZED:  [x] No    [] Yes    DIARRHEA:                [x] No                [] Yes  (C. Difficile status: [] positive                                                                                                                       [] negative                                                                                                                     [] pending)    VASOPRESSORS:  [x] No    [] Yes    If yes -   [] Levophed       [] Dopamine     [] Vasopressin       [] Dobutamine  [] Phenylephrine         [] Epinephrine    CENTRAL LINES:     [x] No   [] Yes   (Date of Insertion:   )           If yes -     [] Right IJ     [] Left IJ [] Right Femoral [] Left Femoral                   [] Right Subclavian [] Left Subclavian       COOMBS'S CATHETER:   [] No   [x] Yes  (Date of Insertion:   )     URINE OUTPUT:            [x] Good   [] Low              [] Anuric    REVIEW OF SYSTEMS: could not be obtained      OBJECTIVE:     VITAL SIGNS:  /74   Pulse 77   Temp 98.8 °F (37.1 °C) (Bladder)   Resp 18   Ht 5' 7\" (1.702 m)   Wt 192 lb 14.4 oz (87.5 kg)   SpO2 98%   BMI 30.21 kg/m²   Tmax over 24 hours:  Temp (24hrs), Av.8 °F (36.6 °C), Min:96.8 °F (36 °C), Max:98.8 °F (37.1 °C)      Patient Vitals for the past 8 hrs:   BP Temp Temp src Pulse Resp SpO2   22 0600 132/74 98.8 °F (37.1 °C) Bladder 77 18 98 %   22 0500 130/78 -- -- 78 18 98 %   22 0400 (!) 146/91 98.4 °F (36.9 °C) Bladder 79 18 98 %   22 0339 -- -- -- 77 18 98 %   22 0300 (!) 150/92 -- -- 75 18 98 %   22 0200 136/86 97.2 °F (36.2 °C) Bladder 71 18 99 %   22 0100 134/89 -- -- 70 18 99 %   22 0000 133/86 96.8 °F (36 °C) Bladder 70 18 98 %   22 2340 -- -- -- 71 18 99 %         Intake/Output Summary (Last 24 hours) at 2022 0717  Last data filed at 2022 0600  Gross per 24 hour   Intake 1026.07 ml   Output 2880 ml   Net -1853.93 ml     Date 22 0000 - 22 2359   Shift 5341-8105 6453-7027 2559-1599 24 Hour Total   INTAKE   I.V.(mL/kg) 704. 8(8.1)   704. 8(8.1)   IV Piggyback(mL/kg) 321.3(3.7)   321.3(3.7)   Shift Total(mL/kg) 1026.1(11.7)   1026.1(11.7)   OUTPUT   Urine(mL/kg/hr) 1005   1005   Shift Total(mL/kg) 1005(11.5)   1005(11.5)   Weight (kg) 87.5 87.5 87.5 87.5     Wt Readings from Last 3 Encounters:   22 192 lb 14.4 oz (87.5 kg)   22 200 lb (90.7 kg)   22 210 lb (95.3 kg)     Body mass index is 30.21 kg/m².         PHYSICAL EXAM:  Constitutional: intubated, sedated  HEENT: PERRLA, EOMI, sclera clear, anicteric  Respiratory: clear to auscultation  Cardiovascular: normal heart sounds  Abdomen: soft  NEUROLOGIC: sedated  Extremities:  peripheral pulses normal, no pedal edema,.       MEDICATIONS:  Scheduled Meds:   phosphorus  250 mg Oral Once    sodium chloride flush  5-40 mL IntraVENous 2 times per day    [Held by provider] enoxaparin  40 mg SubCUTAneous Daily    piperacillin-tazobactam  3,375 mg IntraVENous Q8H    vancomycin (VANCOCIN) intermittent dosing (placeholder)   Other RX Placeholder    vancomycin  1,000 mg IntraVENous Q8H    propofol         Continuous Infusions:   heparin (PORCINE) Infusion 18 Units/kg/hr (12/14/22 7305)    propofol 50 mcg/kg/min (12/14/22 1586)    sodium chloride      sodium chloride Stopped (12/14/22 9651)     PRN Meds:   diphenhydrAMINE, 50 mg, Q6H PRN  heparin (porcine), 80 Units/kg, PRN  heparin (porcine), 40 Units/kg, PRN  sodium chloride flush, 5-40 mL, PRN  sodium chloride, , PRN  ondansetron, 4 mg, Q8H PRN   Or  ondansetron, 4 mg, Q6H PRN  polyethylene glycol, 17 g, Daily PRN  acetaminophen, 650 mg, Q6H PRN   Or  acetaminophen, 650 mg, Q6H PRN  midazolam, 2 mg, Q2H PRN        SUPPORT DEVICES: [x] Ventilator [] BIPAP  [] Nasal Cannula [] Room Air    VENT SETTINGS (Comprehensive) (if applicable):  Vent Information  Ventilator ID: [id number]  Equipment Changed: Airway securing device  Ventilator Initiate: Yes  Additional Respiratory Assessments  Heart Rate: 77  Resp: 18  SpO2: 98 %  End Tidal CO2: 31 (%)  Position: Semi-Wall's  Humidification Source: HME    ABGs:     Lab Results   Component Value Date/Time    PH 7.36 12/07/2015 03:00 PM    PCO2 44 12/07/2015 03:00 PM    PO2 74 12/07/2015 03:00 PM    HCO3 25 12/07/2015 03:00 PM    O2SAT 94 12/07/2015 03:00 PM    FIO2 35.0 12/13/2022 11:48 PM     Lactic Acid:   Lab Results   Component Value Date/Time    LACTA 2.6 06/21/2021 08:22 PM    LACTA 2.1 01/26/2021 04:56 PM    LACTA 1.1 01/15/2018 01:30 PM         DATA:  Complete Blood Count:   Recent Labs     12/13/22  1308 12/14/22  0240   WBC 12.1* 8.9   HGB 11.6* 12.2*   MCV 92.4 91.9    281   RBC 3.84* 4.08*   HCT 35.5* 37.5*   MCH 30.2 29.9   MCHC 32.7 32.5   RDW 13.0 12.6   MPV 10.5 10.2        PT/INR:    Lab Results   Component Value Date/Time    PROTIME 12.0 12/13/2022 01:06 PM    INR 1.1 12/13/2022 01:06 PM     PTT:    Lab Results   Component Value Date/Time    APTT 66.2 12/13/2022 11:08 PM       Basal Metabolic Profile:   Recent Labs     12/13/22  1306 12/13/22  2348 12/14/22  0240     --  136   K 3.8  --  3.8   BUN 20  --  15   CREATININE 1.24* 0.91 0.82   CL 96*  --  99   CO2 24  --  26      Magnesium:   Lab Results   Component Value Date/Time    MG 2.1 12/14/2022 02:40 AM    MG 1.7 12/13/2022 01:06 PM    MG 2.1 08/19/2022 11:39 PM     Phosphorus:   Lab Results   Component Value Date/Time    PHOS 1.7 12/14/2022 02:40 AM    PHOS 3.9 08/19/2022 11:39 PM    PHOS 4.2 07/28/2014 04:09 AM     S. Calcium:  Recent Labs     12/14/22  0240   CALCIUM 8.5*     S. Ionized Calcium:No results for input(s): IONCA in the last 72 hours.       Urinalysis:   Lab Results   Component Value Date/Time    NITRU NEGATIVE 12/13/2022 05:24 PM    COLORU Yellow 12/13/2022 05:24 PM    PHUR 5.5 12/13/2022 05:24 PM    WBCUA 2 TO 5 12/13/2022 05:24 PM    RBCUA 5 TO 10 12/13/2022 05:24 PM    RBCUA 0-2 01/26/2021 04:00 PM    MUCUS 2+ 10/30/2021 12:39 PM    TRICHOMONAS NOT REPORTED 10/30/2021 12:39 PM    YEAST NOT REPORTED 10/30/2021 12:39 PM    BACTERIA FEW 12/13/2022 05:24 PM    SPECGRAV 1.024 12/13/2022 05:24 PM    LEUKOCYTESUR NEGATIVE 12/13/2022 05:24 PM    UROBILINOGEN Normal 12/13/2022 05:24 PM    BILIRUBINUR NEGATIVE 12/13/2022 05:24 PM    BLOODU NEGATIVE 09/12/2021 11:40 PM    GLUCOSEU NEGATIVE 12/13/2022 05:24 PM    KETUA SMALL 12/13/2022 05:24 PM    AMORPHOUS 1+ 12/13/2022 05:24 PM       CARDIAC ENZYMES: No results for input(s): CKMB, CKMBINDEX, TROPONINI in the last 72 hours. Invalid input(s): CKTOTAL;3  BNP: No results for input(s): BNP in the last 72 hours. LFTS  Recent Labs     12/13/22  1306   ALKPHOS 77   *   *   BILITOT 0.4   LABALBU 3.8       AMYLASE/LIPASE/AMMONIA  Recent Labs     12/13/22  1306   LIPASE 16   AMMONIA 32       Last 3 Blood Glucose:   Recent Labs     12/13/22  1306 12/14/22  0240   GLUCOSE 94 135*      HgBA1c:    Lab Results   Component Value Date/Time    LABA1C 5.7 10/23/2014 04:26 AM         TSH:    Lab Results   Component Value Date/Time    TSH 0.41 12/13/2022 01:06 PM     ANEMIA STUDIES  No results for input(s): LABIRON, TIBC, FERRITIN, ZPAODPIQ71, FOLATE, OCCULTBLD in the last 72 hours. Cultures during this admission:     Blood cultures:                 [] None drawn      [] Negative             []  Positive (Details:  )  Urine Culture:                   [] None drawn      [] Negative             []  Positive (Details:  )  Sputum Culture:               [] None drawn       [] Negative             []  Positive (Details:  )   Endotracheal aspirate:     [] None drawn       [] Negative             []  Positive (Details:  )        ASSESSMENT:     Principal Problem:    Acute respiratory failure with hypoxia ([address]  Resolved Problems:    * No resolved hospital problems.  *        PLAN:       Acute hypoxic respiratory failure -  - Secondary to aspiration/intoxication  - Continue mechanical ventilation  - RT ventilation protocol  - Monitor hemodynamics closely  - Chest x-ray tomorrow  - Broad-spectrum antibiotics vancomycin and Zosyn for sepsis of unknown origin and multifocal pneumonia  - ABG as needed  - troponin's trending down   - elevated procal and CRP  - discontinued heparin, CTPE negative for PE  - Follow-up on respiratory panel  - Follow-up on pneumonia work-up  - Follow-up on echo  - Gentle hydration  - Urine drug screen positive for benzodiazepines, cocaine, fentanyl -although the patient did receive benzodiazepines and fentanyl in the ER        Huan Betts MD, MRENATO. Department of Internal Medicine/ Critical care  [facility])             12/14/2022, 7:17 AM         Attending Physician Statement  I have discussed the care of Brianne Johansen, including pertinent history and exam findings with the resident. I have reviewed the key elements of all parts of the encounter with the resident. I have seen and examined the patient with the resident. I agree with the assessment and plan and status of the problem list as documented. Please see full H&P attested today for further details. Please note that this chart was generated using voice recognition Dragon dictation software. Although every effort was made to ensure the accuracy of this automated transcription, some errors in transcription may have occurred.      Mckenna Amin MD  12/14/2022 4:03 PM

## 2022-12-14 NOTE — PLAN OF CARE
Problem: Safety - Medical Restraint  Goal: Remains free of injury from restraints (Restraint for Interference with Medical Device)  Description: INTERVENTIONS:  1. Determine that other, less restrictive measures have been tried or would not be effective before applying the restraint  2. Evaluate the patient's condition at the time of restraint application  3. Inform patient/family regarding the reason for restraint  4.  Q2H: Monitor safety, psychosocial status, comfort, nutrition and hydration  Outcome: Progressing  Flowsheets  Taken 12/14/2022 0200  Remains free of injury from restraints (restraint for interference with medical device): Every 2 hours: Monitor safety, psychosocial status, comfort, nutrition and hydration  Taken 12/14/2022 0000  Remains free of injury from restraints (restraint for interference with medical device): Every 2 hours: Monitor safety, psychosocial status, comfort, nutrition and hydration  Taken 12/13/2022 2230  Remains free of injury from restraints (restraint for interference with medical device): Every 2 hours: Monitor safety, psychosocial status, comfort, nutrition and hydration  Taken 12/13/2022 2200  Remains free of injury from restraints (restraint for interference with medical device): Every 2 hours: Monitor safety, psychosocial status, comfort, nutrition and hydration     Problem: Respiratory - Adult  Goal: Achieves optimal ventilation and oxygenation  12/14/2022 0248 by Surjit Eng RN  Outcome: Progressing  12/13/2022 2156 by Jaren Webber RCP  Flowsheets (Taken 12/13/2022 2156)  Achieves optimal ventilation and oxygenation:   Assess for changes in respiratory status   Position to facilitate oxygenation and minimize respiratory effort   Assess the need for suctioning and aspirate as needed   Respiratory therapy support as indicated   Assess for changes in mentation and behavior   Oxygen supplementation based on oxygen saturation or arterial blood gases     Problem: Discharge Planning  Goal: Discharge to home or other facility with appropriate resources  Outcome: Progressing     Problem: Pain  Goal: Verbalizes/displays adequate comfort level or baseline comfort level  Outcome: Progressing  Flowsheets (Taken 12/13/2022 2145)  Verbalizes/displays adequate comfort level or baseline comfort level: Assess pain using appropriate pain scale     Problem: Confusion  Goal: Confusion, delirium, dementia, or psychosis is improved or at baseline  Description: INTERVENTIONS:  1. Assess for possible contributors to thought disturbance, including medications, impaired vision or hearing, underlying metabolic abnormalities, dehydration, psychiatric diagnoses, and notify attending LIP  2. [place] high risk fall precautions, as indicated  3. Provide frequent short contacts to provide reality reorientation, refocusing and direction  4. Decrease environmental stimuli, including noise as appropriate  5. Monitor and intervene to maintain adequate nutrition, hydration, elimination, sleep and activity  6. If unable to ensure safety without constant attention obtain sitter and review sitter guidelines with assigned personnel  7. Initiate Psychosocial CNS and Spiritual Care consult, as indicated  Outcome: Progressing     Problem: Skin/Tissue Integrity  Goal: Absence of new skin breakdown  Description: 1. Monitor for areas of redness and/or skin breakdown  2. Assess vascular access sites hourly  3. Every 4-6 hours minimum:  Change oxygen saturation probe site  4. Every 4-6 hours:  If on nasal continuous positive airway pressure, respiratory therapy assess nares and determine need for appliance change or resting period.   Outcome: Progressing     Problem: Safety - Adult  Goal: Free from fall injury  Outcome: Progressing

## 2022-12-15 LAB
CULTURE: ABNORMAL
DIRECT EXAM: ABNORMAL
SPECIMEN DESCRIPTION: ABNORMAL

## 2022-12-16 LAB
CULTURE: ABNORMAL
HIV AG/AB: NONREACTIVE
Lab: ABNORMAL
SPECIMEN DESCRIPTION: ABNORMAL

## 2022-12-17 LAB
PNEUMOCOCCAL ANTIBODY TYPE 12: 0.65 UG/ML
PNEUMOCOCCAL ANTIBODY TYPE 14: 0.6 UG/ML
PNEUMOCOCCAL ANTIBODY TYPE 18: 4.94 UG/ML
PNEUMOCOCCAL ANTIBODY TYPE 19F: 13.29 UG/ML
PNEUMOCOCCAL ANTIBODY TYPE 1: 0.74 UG/ML
PNEUMOCOCCAL ANTIBODY TYPE 23: 2.76 UG/ML
PNEUMOCOCCAL ANTIBODY TYPE 3: 2.43 UG/ML
PNEUMOCOCCAL ANTIBODY TYPE 4: 1.18 UG/ML
PNEUMOCOCCAL ANTIBODY TYPE 5: 2.8 UG/ML
PNEUMOCOCCAL ANTIBODY TYPE 6B: 3.13 UG/ML
PNEUMOCOCCAL ANTIBODY TYPE 7F: 7.62 UG/ML
PNEUMOCOCCAL ANTIBODY TYPE 8: 1.94 UG/ML
PNEUMOCOCCAL ANTIBODY TYPE 9N: 5.75 UG/ML
PNEUMOCOCCAL ANTIBODY TYPE 9V: >16.5 UG/ML
PNEUMOCOCCAL INTERPRETATION: NORMAL

## 2022-12-18 LAB
CULTURE: NORMAL
Lab: NORMAL
SPECIMEN DESCRIPTION: NORMAL

## 2022-12-18 NOTE — DISCHARGE SUMMARY
[address] [facility]     Department of Internal Medicine - Staff Internal Medicine Teaching Service    INPATIENT DISCHARGE SUMMARY      Patient Identification:  Rubi Chacon is a 28 y.o. male. :  1990  MRN: [id number]     Acct: [de-identified]   PCP: Severiano Alexis MD  Admit Date:  2022  Discharge date and time: 2022  Attending Provider: No att. providers found                                     [address] Problem Lists:  Principal Problem:    Acute respiratory failure with hypoxia ([address]  Resolved Problems:    * No resolved hospital problems. *      HOSPITAL STAY     Brief Inpatient course: Rubi Chacon is a 28 y.o. male who was admitted for the management of Acute respiratory failure with hypoxia ([address] presented to the emergency department from shelter with symptoms of altered mental status. Patient was not cooperative for any kind of physical exam and was agitated. Patient was initially hypoxic in the 80s and was placed on nasal cannula eventually nonrebreather mask. Patient got agitated and received Versed in the ED. Eventually started on Precedex drip. Later patient's respiratory rate was found to be low and was hypoxic decision was made to intubate for airway protection. Admitted to MICU for hypoxic respiratory failure secondary to aspiration pneumonia versus PE. Broad-spectrum antibiotics were started. Urine drug screen was positive for cocaine, fentanyl. The next day patient was extubated as he was tolerating weaning trials well. Later that night patient left AMA    Procedures/ Significant Interventions:      XR ANKLE RIGHT (MIN 3 VIEWS)    Result Date: 2022  No acute findings in the right ankle. XR FOOT RIGHT (MIN 3 VIEWS)    Result Date: 2022  No acute findings in the right foot. CT HEAD WO CONTRAST    Result Date: 2022  No acute intracranial abnormality.      CT CERVICAL SPINE WO CONTRAST    Result Date: 12/13/2022  No acute abnormality of the cervical spine. XR CHEST PORTABLE    Result Date: 12/13/2022  No acute cardiopulmonary disease. CT CHEST PULMONARY EMBOLISM W CONTRAST    Result Date: 12/13/2022  No evidence of pulmonary embolism Patchy bilateral airspace and ground-glass infiltrates. Findings are compatible with bilateral pneumonia, possibly related to COVID     XR ABDOMEN FOR NG/OG/NE TUBE PLACEMENT    Result Date: 12/13/2022  Enteric tube appropriately positioned. CT CHEST ABDOMEN PELVIS WO CONTRAST Additional Contrast? None    Result Date: 12/13/2022  Bilateral mainly ground-glass patchy infiltrates noted in the right and left upper lobes. There is a reverse halo sign noted in some of these lesions, suggesting cryptogenic organizing pneumonia. Examination otherwise unremarkable. CT LUMBAR SPINE TRAUMA RECONSTRUCTION    Result Date: 12/13/2022  Unremarkable non-contrast CT of the lumbar spine. CT THORACIC SPINE TRAUMA RECONSTRUCTION    Result Date: 12/13/2022  Unremarkable CT of the thoracic spine. Consults:     Consults:     Final Specialist Recommendations/Findings:   IP CONSULT TO CARDIOLOGY  IP CONSULT TO CRITICAL CARE  PHARMACY TO DOSE VANCOMYCIN      Any Hospital Acquired Infections: none    Discharge Functional Status:  stable    DISCHARGE PLAN     Disposition: Left AMA    Patient Instructions:   Discharge Medication List as of 12/14/2022  6:02 PM        CONTINUE these medications which have NOT CHANGED    Details   ALPRAZolam (XANAX) 0.5 MG tablet Historical Med      QUEtiapine (SEROQUEL) 100 MG tablet Historical Med      tiZANidine (ZANAFLEX) 4 MG tablet Take 1 tablet by mouth every 6 hours as needed (muscle pain), Disp-8 tablet, R-0Normal      hydrocortisone 1 % cream Apply topically 2 -3 times daily for 5 - 7 days. , Disp-30 g, R-0, Normal      cloNIDine (CATAPRES) 0.1 MG tablet Take 1 tablet by mouth nightly for 3 days, Disp-3 tablet, R-0Print Zinc Oxide 6 % CREA Apply 1 applicator topically in the morning and at bedtime, Disp-114 g, R-0Print      ARIPiprazole (ABILIFY) 15 MG tablet Take 1 tablet by mouth daily, Disp-30 tablet, R-3Normal      cetirizine (ZYRTEC) 10 MG tablet Take 1 tablet by mouth daily, Disp-30 tablet, R-0Normal           STOP taking these medications       meloxicam (MOBIC) 7.5 MG tablet Comments:   Reason for Stopping:               Patient left AMA      Note that over 30 minutes was spent in preparing discharge papers, discussing discharge with patient, medication review, etc.  .    Mary Gillis  PGY-2  Internal Medicine  [facility]   5:10 [place] 12/18/2022

## 2023-01-12 ENCOUNTER — HOSPITAL ENCOUNTER (EMERGENCY)
Age: 33
Discharge: HOME | End: 2023-01-12
Attending: EMERGENCY MEDICINE
Payer: MEDICARE

## 2023-01-12 VITALS
OXYGEN SATURATION: 95 % | HEIGHT: 67 IN | HEART RATE: 74 BPM | WEIGHT: 175 LBS | RESPIRATION RATE: 16 BRPM | DIASTOLIC BLOOD PRESSURE: 86 MMHG | TEMPERATURE: 97.2 F | BODY MASS INDEX: 27.47 KG/M2 | SYSTOLIC BLOOD PRESSURE: 123 MMHG

## 2023-01-12 DIAGNOSIS — Z59.00 HOMELESSNESS: ICD-10-CM

## 2023-01-12 DIAGNOSIS — M25.50 ARTHRALGIA, UNSPECIFIED JOINT: Primary | ICD-10-CM

## 2023-01-12 PROCEDURE — 99283 EMERGENCY DEPT VISIT LOW MDM: CPT

## 2023-01-12 PROCEDURE — 6370000000 HC RX 637 (ALT 250 FOR IP): Performed by: STUDENT IN AN ORGANIZED HEALTH CARE EDUCATION/TRAINING PROGRAM

## 2023-01-12 RX ORDER — ACETAMINOPHEN 500 MG
1000 TABLET ORAL ONCE
Status: COMPLETED | OUTPATIENT
Start: 2023-01-12 | End: 2023-01-12

## 2023-01-12 RX ORDER — ACETAMINOPHEN 325 MG/1
325 TABLET ORAL EVERY 6 HOURS PRN
Qty: 30 TABLET | Refills: 0 | Status: SHIPPED | OUTPATIENT
Start: 2023-01-12 | End: 2023-01-12 | Stop reason: SDUPTHER

## 2023-01-12 RX ORDER — ACETAMINOPHEN 325 MG/1
325 TABLET ORAL EVERY 6 HOURS PRN
Qty: 5 TABLET | Refills: 0 | Status: SHIPPED | OUTPATIENT
Start: 2023-01-12

## 2023-01-12 RX ADMIN — ACETAMINOPHEN 1000 MG: 500 TABLET ORAL at 05:31

## 2023-01-12 SDOH — ECONOMIC STABILITY - HOUSING INSECURITY: HOMELESSNESS UNSPECIFIED: Z59.00

## 2023-01-12 ASSESSMENT — ENCOUNTER SYMPTOMS
CHEST TIGHTNESS: 0
CONSTIPATION: 0
TROUBLE SWALLOWING: 0
ABDOMINAL PAIN: 0
WHEEZING: 0
SHORTNESS OF BREATH: 0
SORE THROAT: 0
SINUS PRESSURE: 0
DIARRHEA: 0
COUGH: 0
FACIAL SWELLING: 0
SINUS PAIN: 0
COLOR CHANGE: 0
NAUSEA: 0
VOMITING: 0

## 2023-01-12 NOTE — ED PROVIDER NOTES
[address]     Emergency Department     Faculty Attestation    I performed a history and physical examination of the patient and discussed management with the resident. I have reviewed and agree with the residents findings including all diagnostic interpretations, and treatment plans as written. Any areas of disagreement are noted on the chart. I was personally present for the key portions of any procedures. I have documented in the chart those procedures where I was not present during the key portions. I have reviewed the emergency nurses triage note. I agree with the chief complaint, past medical history, past surgical history, allergies, medications, social and family history as documented unless otherwise noted below. Documentation of the HPI, Physical Exam and Medical Decision Making performed by scribmargo is based on my personal performance of the HPI, PE and MDM. For Physician Assistant/ Nurse Practitioner cases/documentation I have personally evaluated this patient and have completed at least one if not all key elements of the E/M (history, physical exam, and MDM). Additional findings are as noted.     27 yo M c/o bilateral ankle pain without injury, c/o accidentally cutting L 3rd digit, current dT, no fever, no injury, c/o nausea, no cp, denies ha,   PE vss gcs 15 talkative, ambulatory, no cervical tenderness, abdomen non tender, no distension, no rigidity,   Healing laceration, distal dorsal 3rd finger, nv intact without deformity, tendon function intact, bilateral ankles no deformity, nv intact although pt resistant to me doing more to bilateral ankles than take D pedal pulses which are palpable,     -pt would not allow me to do further exam, pt is reluctant historian, vss pt ambulatory, no indication of acute injury, pt discharged,   Pt threw his water glass & discharge papers & left,     EKG Interpretation    Interpreted by me      CRITICAL CARE: There was a high probability of clinically significant/life threatening deterioration in this patient's condition which required my urgent intervention. Total critical care time was 0 minutes. This excludes any time for separately reportable procedures.        Dontrell Venturaa,   01/12/23 [facility],   01/12/23 1800

## 2023-01-12 NOTE — ED NOTES
Pt arrives to ED for multiple complaints. Pt states he has bilateral ankle pain, headache, finger lac, and rash on chest.   Pt states ankle pain and lac happened today, refusing to tell what happened. Pt refusing further nursing assessment, refusing to be placed in gown. Pt not sure what medications he is taking, but states he did have his pills today. Pt states he just wants the light turned down and needs the doctor to figure out what is going on. Pt A&Ox4, NAD.      Dalia Leroy RN  01/12/23 9543

## 2023-01-12 NOTE — ED PROVIDER NOTES
[facility] ED  Emergency Department Encounter  Emergency Medicine Resident     Pt Name: Rubi Chacon  MRN: [id number]  [place] 1990  Date of evaluation: 1/12/23  PCP:  Severiano Alexis MD    [address] [place] [address]       Chief Complaint   Patient presents with    Ankle Pain    Headache       HISTORY OFPRESENT ILLNESS  (Location/Symptom, Timing/Onset, Context/Setting, Quality, Duration, Modifying Factors,Severity.)      Rubi Chacon is a 28 y. o.yo male who presents with bilateral ankle soreness and headache. Patient denies any injury. States he has been walking frequently. Has not taken anything for this prior to arrival.  He states he has no falls, no numbness tingling. Patient denies any skin changes, fevers or chills. Patient states he is able to ambulate but states his ankles are sore. He states it is not the worst headache of his life, states he has a mild pressure in his head, denies any vision changes    PAST MEDICAL / SURGICAL / SOCIAL / FAMILY HISTORY      has a past medical history of Anxiety, Arthritis, Asthma, Bipolar I disorder, most recent episode (or current) depressed, unspecified, Clostridium difficile infection, COPD (chronic obstructive pulmonary disease) ([address] Depression, Disease of blood and blood forming organ, Eczema, Fracture, metacarpal, Gastric ulcer, Gastritis, GERD (gastroesophageal reflux disease), GI bleed, H. pylori infection, H/O blood clots, Head injury, Headache, Insomnia, Juvenile rheumatoid arthritis ([address] Neuromuscular disorder ([address] PFAPA syndrome ([address] PUD (peptic ulcer disease), Rheumatoid arthritis ([address] Rheumatoid arthritis(714.0), Severe recurrent major depression without psychotic features ([address] Sleep apnea, 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[address] Substance abuse ([address] Suicidal ideation, Suicide attempt by hanging ([address] Tobacco dependence, Ulcerative colitis ([address] and UTI (urinary tract infection).      has a past surgical history that includes Colonoscopy; bronchoscopy; other surgical history; Upper gastrointestinal endoscopy (2/4/16); pr egd transoral biopsy single/multiple (N/A, 3/20/2017); sigmoidoscopy (N/A, 3/20/2017); Cholecystectomy, laparoscopic (07/14/2017); pr esophagogastroduodenoscopy transoral diagnostic (N/A, 8/9/2017); pr colonoscopy w/biopsy single/multiple (8/9/2017); Abdomen surgery; Upper gastrointestinal endoscopy (N/A, 6/13/2018); Upper gastrointestinal endoscopy (N/A, 9/20/2018); and Endoscopy, colon, diagnostic. Social History     Socioeconomic History    Marital status: Single     Spouse name: Not on file    Number of children: Not on file    Years of education: Not on file    Highest education level: Not on file   Occupational History    Occupation: disability   Tobacco Use    Smoking status: Every Day     Packs/day: 0.50     Years: 15.00     Pack years: 7.50     Types: Cigarettes    Smokeless tobacco: Never   Vaping Use    Vaping Use: Former   Substance and Sexual Activity    Alcohol use: Not Currently     Comment: drinks daily    Drug use: Not Currently     Types: Opiates , Cocaine     Comment: Hx of opiates, benzos, cocaine, alcohol abuse    Sexual activity: Yes     Partners: Female     Comment: Lives alone, not working.    Other Topics Concern    Not on file   Social History Narrative    ** Merged History Encounter **          Social Determinants of Health     Financial Resource Strain: Not on file   Food Insecurity: Not on file   Transportation Needs: Not on file   Physical Activity: Not on file   Stress: Not on file   Social Connections: Not on file   Intimate Partner Violence: Not on file   Housing Stability: Not on file       Family History   Problem Relation Age of Onset    Diabetes Father     Alcohol Abuse Father     Depression Father     Arthritis Father     High Blood Pressure Father     Other Father         aneurysm & epilepsy    Migraines Father     Arthritis Mother     Other Mother         aneurysm & epilepsy Migraines Mother     Diabetes Brother         Aunt and uncles    Depression Brother     Mental Illness Brother     Other Brother         epilepsy    Migraines Brother     Stroke Other         Uncle    Other Brother         murdered Oct 6th, 2014    Colon Cancer Paternal Cousin 43    Other Sister         epilepsy    Migraines Sister         Allergies:  Dicyclomine, Famotidine, Geodon [ziprasidone hcl], Haloperidol, Iv dye [iodides], Reglan [metoclopramide], Ibuprofen, Aspirin, Dicyclomine hcl, Hydroxyzine hcl, Iodine, Metronidazole, Mirtazapine, Promethazine, and Ziprasidone    Home Medications:  Prior to Admission medications    Medication Sig Start Date End Date Taking? Authorizing Provider   acetaminophen (TYLENOL) 325 MG tablet Take 1 tablet by mouth every 6 hours as needed for Pain 1/12/23  Yes Mali Benson,    ALPRAZolam Mason Creamer) 0.5 MG tablet  9/4/22   Historical Provider, MD   QUEtiapine (SEROQUEL) 100 MG tablet  10/31/22   Historical Provider, MD   tiZANidine (ZANAFLEX) 4 MG tablet Take 1 tablet by mouth every 6 hours as needed (muscle pain) 12/4/22   Marli Vinson MD   hydrocortisone 1 % cream Apply topically 2 -3 times daily for 5 - 7 days. 12/4/22   Marli Vinson MD   cloNIDine (CATAPRES) 0.1 MG tablet Take 1 tablet by mouth nightly for 3 days 7/14/22 7/17/22  Marleen Lyles APRN - CNP   Zinc Oxide 6 % CREA Apply 1 applicator topically in the morning and at bedtime 7/3/22   CHER Maher MD   ARIPiprazole (ABILIFY) 15 MG tablet Take 1 tablet by mouth daily 6/22/22   Yvette Cordova MD   cetirizine (ZYRTEC) 10 MG tablet Take 1 tablet by mouth daily 6/22/22   Yvette Cordova MD   meloxicam (MOBIC) 7.5 MG tablet Take 1 tablet by mouth 2 times daily as needed for Pain 8/19/21 10/30/21  Deon Nix APRN - CNP       REVIEW OFSYSTEMS    (2-9 systems for level 4, 10 or more for level 5)      Review of Systems   Constitutional:  Negative for chills, diaphoresis, fatigue and fever.    HENT:  Negative for facial swelling, nosebleeds, sinus pressure, sinus pain, sore throat and trouble swallowing. Respiratory:  Negative for cough, chest tightness, shortness of breath and wheezing. Cardiovascular:  Negative for chest pain, palpitations and leg swelling. Gastrointestinal:  Negative for abdominal pain, constipation, diarrhea, nausea and vomiting. Endocrine: Negative for polydipsia, polyphagia and polyuria. Genitourinary:  Negative for dysuria, flank pain and hematuria. Musculoskeletal:  Positive for arthralgias. Negative for gait problem, neck pain and neck stiffness. Skin:  Negative for color change, rash and wound. Neurological:  Positive for headaches. Negative for dizziness, syncope, weakness and light-headedness. PHYSICAL EXAM   (up to 7 for level 4, 8 or more forlevel 5)      INITIAL VITALS:   ED Triage Vitals [01/12/23 0459]   BP Temp Temp Source Heart Rate Resp SpO2 Height Weight   123/86 97.2 °F (36.2 °C) Oral 74 16 95 % 5' 7\" (1.702 m) 175 lb (79.4 kg)       Physical Exam  Constitutional:       General: He is not in acute distress. Appearance: He is normal weight. He is not ill-appearing. HENT:      Head: Normocephalic and atraumatic. Right Ear: External ear normal.      Left Ear: External ear normal.      Nose: Nose normal.   Eyes:      General: No scleral icterus. Extraocular Movements: Extraocular movements intact. Conjunctiva/sclera: Conjunctivae normal.      Pupils: Pupils are equal, round, and reactive to light. Cardiovascular:      Rate and Rhythm: Normal rate and regular rhythm. Pulses: Normal pulses. Heart sounds: Normal heart sounds. Pulmonary:      Effort: Pulmonary effort is normal. No respiratory distress. Breath sounds: Normal breath sounds. Abdominal:      General: Abdomen is flat. Bowel sounds are normal. There is no distension. Palpations: Abdomen is soft. Tenderness: There is no abdominal tenderness.    Musculoskeletal: General: No swelling, tenderness, deformity or signs of injury. Normal range of motion. Cervical back: Normal range of motion. No muscular tenderness. Skin:     General: Skin is warm and dry. Findings: No bruising or erythema. Neurological:      General: No focal deficit present. Mental Status: He is alert and oriented to person, place, and time. Cranial Nerves: No cranial nerve deficit. Motor: No weakness. Gait: Gait normal.       DIFFERENTIAL  DIAGNOSIS     PLAN (LABS / IMAGING / EKG):  No orders of the defined types were placed in this encounter. MEDICATIONS ORDERED:  Orders Placed This Encounter   Medications    acetaminophen (TYLENOL) tablet 1,000 mg    DISCONTD: acetaminophen (TYLENOL) 325 MG tablet     Sig: Take 1 tablet by mouth every 6 hours as needed for Pain     Dispense:  30 tablet     Refill:  0    acetaminophen (TYLENOL) 325 MG tablet     Sig: Take 1 tablet by mouth every 6 hours as needed for Pain     Dispense:  5 tablet     Refill:  0       DDX: Arthralgias, headache, homelessness    Initial MDM/Plan: 28 y.o. male who presents with concerns of ankle soreness bilaterally as well as a mild headache. Patient has not taken anything for this prior to arrival.  States symptoms of been ongoing for the past couple of days. Denies any vision changes, no neurodeficits. Patient is ambulatory. Physical exam is unremarkable. We will plan to treat symptomatically with Tylenol then discharged home with PCP follow-up. DIAGNOSTIC RESULTS / EMERGENCYDEPARTMENT COURSE / MDM     LABS:  Labs Reviewed - No data to display      RADIOLOGY:  No results found. EKG      All EKG's are interpreted by the Emergency Department Physicianwho either signs or Co-signs this chart in the absence of a cardiologist.    EMERGENCY DEPARTMENT COURSE:  ED Course as of 01/12/23 0542   Thu Jan 12, 2023   0523 Patient seen and evaluated. He is able to ambulate and bear weight on his feet. Sensation is intact, distal pulses intact. We will offer Tylenol and discharge [CD]      ED Course User Index  [CD] Stephanie Davison DO          PROCEDURES:  None    CONSULTS:  None    CRITICAL CARE:  0    FINAL IMPRESSION      1. Arthralgia, unspecified joint    2.  Homelessness          DISPOSITION / PLAN     DISPOSITION Decision To Discharge 01/12/2023 05:25:10 AM      PATIENT REFERRED TO:  Darrick Hurt MD  [address]  [phone]    Call   For ER follow-up    [facility] AT [place] [id number] [phone]  Schedule an appointment as soon as possible for a visit in 3 days  For ER follow-up    DISCHARGE MEDICATIONS:  Discharge Medication List as of 1/12/2023  5:28 AM        START taking these medications    Details   acetaminophen (TYLENOL) 325 MG tablet Take 1 tablet by mouth every 6 hours as needed for Pain, Disp-30 tablet, R-0Print             Stephanie Davison DO  Emergency Medicine Resident    (Please note that portions of this note were completed with a voice recognition program.Efforts were made to edit the dictations but occasionally words are mis-transcribed.)       Stephanie Davison DO  Resident  01/12/23 9953

## 2023-01-12 NOTE — DISCHARGE INSTRUCTIONS
Thank you for visiting [facility] Emergency Department. You need to call Noris Walton MD to make an appointment as directed for follow up. Should you have any questions regarding your care or further treatment, please call [facility] at [phone].

## 2023-01-14 ENCOUNTER — APPOINTMENT (OUTPATIENT)
Dept: GENERAL RADIOLOGY | Age: 33
End: 2023-01-14
Payer: MEDICARE

## 2023-01-14 ENCOUNTER — HOSPITAL ENCOUNTER (EMERGENCY)
Age: 33
Discharge: HOME | End: 2023-01-14
Attending: EMERGENCY MEDICINE
Payer: MEDICARE

## 2023-01-14 VITALS
OXYGEN SATURATION: 96 % | TEMPERATURE: 98.4 F | RESPIRATION RATE: 18 BRPM | HEART RATE: 85 BPM | SYSTOLIC BLOOD PRESSURE: 140 MMHG | HEIGHT: 67 IN | BODY MASS INDEX: 28.25 KG/M2 | DIASTOLIC BLOOD PRESSURE: 89 MMHG | WEIGHT: 180 LBS

## 2023-01-14 VITALS
RESPIRATION RATE: 18 BRPM | TEMPERATURE: 97.3 F | BODY MASS INDEX: 28.25 KG/M2 | SYSTOLIC BLOOD PRESSURE: 159 MMHG | HEIGHT: 67 IN | OXYGEN SATURATION: 98 % | HEART RATE: 105 BPM | DIASTOLIC BLOOD PRESSURE: 97 MMHG | WEIGHT: 180 LBS

## 2023-01-14 DIAGNOSIS — S90.31XA CONTUSION OF RIGHT FOOT, INITIAL ENCOUNTER: Primary | ICD-10-CM

## 2023-01-14 DIAGNOSIS — Z76.5 MALINGERING: Primary | ICD-10-CM

## 2023-01-14 DIAGNOSIS — R21 RASH AND OTHER NONSPECIFIC SKIN ERUPTION: ICD-10-CM

## 2023-01-14 DIAGNOSIS — S90.32XA CONTUSION OF LEFT FOOT, INITIAL ENCOUNTER: ICD-10-CM

## 2023-01-14 PROCEDURE — 73630 X-RAY EXAM OF FOOT: CPT

## 2023-01-14 PROCEDURE — 99283 EMERGENCY DEPT VISIT LOW MDM: CPT

## 2023-01-14 PROCEDURE — 73610 X-RAY EXAM OF ANKLE: CPT

## 2023-01-14 PROCEDURE — 99282 EMERGENCY DEPT VISIT SF MDM: CPT

## 2023-01-14 RX ORDER — TRIAMCINOLONE ACETONIDE 1 MG/G
CREAM TOPICAL
Qty: 80 G | Refills: 0 | Status: SHIPPED | OUTPATIENT
Start: 2023-01-14

## 2023-01-14 ASSESSMENT — PAIN SCALES - GENERAL: PAINLEVEL_OUTOF10: 9

## 2023-01-14 ASSESSMENT — PAIN - FUNCTIONAL ASSESSMENT: PAIN_FUNCTIONAL_ASSESSMENT: 0-10

## 2023-01-14 NOTE — ED PROVIDER NOTES
[address] [place] [address] ED  EMERGENCY DEPARTMENT ENCOUNTER      Pt Name: Carlos Singer  MRN: [id number]  [place] 1990  Date of evaluation: 1/14/2023  Provider: Darrel Pruitt MD    [address] [place] [address]       Chief Complaint   Patient presents with    Rash    Abdominal Pain         HISTORY OF PRESENT ILLNESS  (Location/Symptom, Timing/Onset, Context/Setting, Quality, Duration, Modifying Factors, Severity.)   Carlos Singer is a 28 y.o. male who presents to the emergency department for unclear reasons. The main issue seems to be some red marks by his wrists. He states he did not have handcuffs on and he has not been in poison ivy. Its not clear when this started. He is an extremely poor historian. This is his third emergency department visit this day. No further history is obtainable from this patient. Nursing Notes were reviewed. ALLERGIES     Dicyclomine, Famotidine, Geodon [ziprasidone hcl], Haloperidol, Iv dye [iodides], Reglan [metoclopramide], Ibuprofen, Aspirin, Dicyclomine hcl, Hydroxyzine hcl, Iodine, Metronidazole, Mirtazapine, Promethazine, and Ziprasidone    CURRENT MEDICATIONS       Previous Medications    ACETAMINOPHEN (TYLENOL) 325 MG TABLET    Take 1 tablet by mouth every 6 hours as needed for Pain    ALPRAZOLAM (XANAX) 0.5 MG TABLET        ARIPIPRAZOLE (ABILIFY) 15 MG TABLET    Take 1 tablet by mouth daily    CETIRIZINE (ZYRTEC) 10 MG TABLET    Take 1 tablet by mouth daily    CLONIDINE (CATAPRES) 0.1 MG TABLET    Take 1 tablet by mouth nightly for 3 days    HYDROCORTISONE 1 % CREAM    Apply topically 2 -3 times daily for 5 - 7 days.     QUETIAPINE (SEROQUEL) 100 MG TABLET        TIZANIDINE (ZANAFLEX) 4 MG TABLET    Take 1 tablet by mouth every 6 hours as needed (muscle pain)    ZINC OXIDE 6 % CREA    Apply 1 applicator topically in the morning and at bedtime       PAST MEDICAL HISTORY         Diagnosis Date    Anxiety     Arthritis     Asthma     Bipolar I disorder, most recent episode (or current) depressed, unspecified 9/12/2014    Clostridium difficile infection     COPD (chronic obstructive pulmonary disease) (HCC)     Depression     Disease of blood and blood forming organ     Eczema     Fracture, metacarpal     R 4th and 5th    Gastric ulcer     Gastritis 06/13/2018    GERD (gastroesophageal reflux disease)     GI bleed     H. 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[address]     Substance abuse ([address]     Hx of opiates, benzos, cocaine, alcohol abuse    Suicidal ideation     Suicide attempt by hanging ([address]     Tobacco dependence     Ulcerative colitis ([address]     UTI (urinary tract infection)        SURGICAL HISTORY           Procedure Laterality Date    ABDOMEN SURGERY      upper GI scope 7/7/2015    BRONCHOSCOPY      CHOLECYSTECTOMY, LAPAROSCOPIC  07/14/2017    surgery performed at [address] [place] [address], COLON, DIAGNOSTIC      OTHER SURGICAL HISTORY      lumbar puncture    IA COLONOSCOPY W/BIOPSY SINGLE/MULTIPLE  8/9/2017    COLONOSCOPY WITH BIOPSY performed by Vasyl Issa MD at [facility]    IA EGD TRANSORAL BIOPSY SINGLE/MULTIPLE N/A 3/20/2017    EGD BIOPSY performed by Chase Charles MD at [facility]    IA ESOPHAGOGASTRODUODENOSCOPY TRANSORAL DIAGNOSTIC N/A 8/9/2017    EGD ESOPHAGOGASTRODUODENOSCOPY performed by Vasyl Issa MD at [address] [place] [address] N/A 3/20/2017    SIGMOIDOSCOPY DIAGNOSTIC FLEXIBLE performed by Chase Charles MD at [place] [address]  2/4/16    UPPER GASTROINTESTINAL ENDOSCOPY N/A 6/13/2018    GASTRITIS    UPPER GASTROINTESTINAL ENDOSCOPY N/A 9/20/2018    EGD BIOPSY performed by Jessica Mcneil MD at [facility]           Problem Relation Age of Onset    Diabetes Father     Alcohol Abuse Father     Depression Father     Arthritis Father     High Blood Pressure Father     Other Father         aneurysm & epilepsy    Migraines Father     Arthritis Mother     Other Mother         aneurysm & epilepsy    Migraines Mother     Diabetes Brother         Aunt and uncles    Depression Brother     Mental Illness Brother     Other Brother         epilepsy    Migraines Brother     Stroke Other         Uncle    Other Brother         murdered Oct 6th, 2014    Colon Cancer Paternal Cousin 43    Other Sister         epilepsy    Migraines Sister      Family Status   Relation Name Status    Father  Alive    Mother  Alive    Brother  (Not Specified)    Other  (Not Specified)    Brother  (Not Specified)    PCousin  (Not Specified)    Sister  (Not Specified)        SOCIAL HISTORY      reports that he has been smoking cigarettes. He has a 7.50 pack-year smoking history. He has never used smokeless tobacco. He reports that he does not currently use alcohol. He reports that he does not currently use drugs after having used the following drugs: Opiates  and Cocaine. REVIEW OF SYSTEMS    (2-9 systems for level 4, 10 or more for level 5)     Review of Systems   Unable to perform ROS: Psychiatric disorder      Except as noted above the remainder of the review of systems was reviewed and negative. PHYSICAL EXAM    (up to 7 for level 4, 8 or more for level 5)     Vitals:    01/14/23 0641   BP: (!) 140/89   Pulse: 85   Resp: 18   Temp: 98.4 °F (36.9 °C)   TempSrc: Oral   SpO2: 96%   Weight: 180 lb (81.6 kg)   Height: 5' 7\" (1.702 m)       Physical Exam  Vitals reviewed. Constitutional:       General: He is not in acute distress. Appearance: He is well-developed. He is not diaphoretic.       Comments: He is sleeping when I walk into the room but easily wakes up to voice. HENT:      Head: Normocephalic and atraumatic. Eyes:      General: No scleral icterus. Right eye: No discharge. Left eye: No discharge. Cardiovascular:      Rate and Rhythm: Normal rate and regular rhythm. Pulmonary:      Effort: Pulmonary effort is normal. No respiratory distress. Breath sounds: Normal breath sounds. No stridor. No wheezing or rales. Abdominal:      General: There is no distension. Palpations: Abdomen is soft. Tenderness: There is no abdominal tenderness. Musculoskeletal:         General: Normal range of motion. Cervical back: Neck supple. Lymphadenopathy:      Cervical: No cervical adenopathy. Skin:     General: Skin is warm and dry. Comments: He has linear erythematous areas on both wrists. There is no laceration. There is no surrounding erythema or evidence of an infection. Neurological:      Mental Status: He is alert. Comments: He is slightly drowsy. Psychiatric:         Behavior: Behavior normal.           DIAGNOSTIC RESULTS     EKG: All EKG's are interpreted by the Emergency Department Physician who either signs or Co-signs this chart in the absence of a cardiologist.    Not indicated    RADIOLOGY:   Non-plain film images such as CT, Ultrasound and MRI are read by the radiologist. Plain radiographic images are visualized and preliminarily interpreted by the emergency physician with the below findings:    Not indicated    Interpretation per the Radiologist below, if available at the time of this note:        LABS:  Labs Reviewed - No data to display    All other labs were within normal range or not returned as of this dictation.     EMERGENCY DEPARTMENT COURSE and DIFFERENTIAL DIAGNOSIS/MDM:   Vitals:    Vitals:    01/14/23 0641   BP: (!) 140/89   Pulse: 85   Resp: 18   Temp: 98.4 °F (36.9 °C)   TempSrc: Oral   SpO2: 96%   Weight: 180 lb (81.6 kg)   Height: 5' 7\" (1.702 m)       No orders of the defined types were placed in this encounter. HEART SCORE: Not applicable    Medical Decision Making: There is no emergent condition present and he is able to be discharged. Evaluation and treatment course in the ED, and plan of care upon discharge was discussed in length with the patient. Patient had no further questions prior to being discharged and was instructed to return to the ED for new or worsening symptoms. DDx include abrasions, poison ivy      Test considered, but not ordered: None    The patient was involved in his/her plan of care. The testing that was ordered was discussed with the patient. Any medications that may have been ordered were discussed with the patient. I have reviewed the patient's previous medical records using the electronic health record that we have available. CONSULTS:  None    PROCEDURES:  None    FINAL IMPRESSION      1. Malingering          DISPOSITION/PLAN   DISPOSITION Decision To Discharge 01/14/2023 07:16:32 AM      PATIENT REFERRED TO:   Job Garcia MD  [facility] [address]  [phone]      As needed    [facility] ED  [address] [facility]  [phone]    If symptoms worsen      DISCHARGE MEDICATIONS:     New Prescriptions    No medications on file       The care is provided during an unprecedented national emergency due to the novel coronavirus, COVID-19.     (Please note that portions of this note were completed with a voice recognition program.  Efforts were made to edit the dictations but occasionally words are mis-transcribed.)    Blaise Moore MD  Attending Emergency Physician           Blaise Moore MD  01/14/23 2905

## 2023-01-15 ENCOUNTER — HOSPITAL ENCOUNTER (EMERGENCY)
Age: 33
Discharge: HOME | End: 2023-01-15
Attending: EMERGENCY MEDICINE
Payer: MEDICARE

## 2023-01-15 VITALS
OXYGEN SATURATION: 97 % | HEART RATE: 75 BPM | SYSTOLIC BLOOD PRESSURE: 115 MMHG | RESPIRATION RATE: 14 BRPM | DIASTOLIC BLOOD PRESSURE: 74 MMHG

## 2023-01-15 VITALS
TEMPERATURE: 97 F | HEART RATE: 84 BPM | DIASTOLIC BLOOD PRESSURE: 70 MMHG | OXYGEN SATURATION: 100 % | SYSTOLIC BLOOD PRESSURE: 113 MMHG | RESPIRATION RATE: 16 BRPM

## 2023-01-15 DIAGNOSIS — M79.2 NEUROPATHIC PAIN: Primary | ICD-10-CM

## 2023-01-15 DIAGNOSIS — M79.2 NEUROPATHIC PAIN: ICD-10-CM

## 2023-01-15 DIAGNOSIS — B86 SCABIES: ICD-10-CM

## 2023-01-15 DIAGNOSIS — B86 SCABIES: Primary | ICD-10-CM

## 2023-01-15 PROCEDURE — 99283 EMERGENCY DEPT VISIT LOW MDM: CPT

## 2023-01-15 PROCEDURE — 99282 EMERGENCY DEPT VISIT SF MDM: CPT

## 2023-01-15 PROCEDURE — 6370000000 HC RX 637 (ALT 250 FOR IP): Performed by: STUDENT IN AN ORGANIZED HEALTH CARE EDUCATION/TRAINING PROGRAM

## 2023-01-15 RX ORDER — DIPHENHYDRAMINE HCL 25 MG
25 TABLET ORAL ONCE
Status: DISCONTINUED | OUTPATIENT
Start: 2023-01-15 | End: 2023-01-16 | Stop reason: HOSPADM

## 2023-01-15 RX ORDER — CETIRIZINE HYDROCHLORIDE 10 MG/1
10 TABLET ORAL ONCE
Status: COMPLETED | OUTPATIENT
Start: 2023-01-15 | End: 2023-01-15

## 2023-01-15 RX ORDER — PERMETHRIN 50 MG/G
CREAM TOPICAL
Qty: 60 G | Refills: 0 | Status: SHIPPED | OUTPATIENT
Start: 2023-01-15

## 2023-01-15 RX ADMIN — CETIRIZINE HYDROCHLORIDE 10 MG: 10 TABLET ORAL at 04:09

## 2023-01-15 ASSESSMENT — PAIN SCALES - GENERAL: PAINLEVEL_OUTOF10: 10

## 2023-01-15 ASSESSMENT — PAIN DESCRIPTION - FREQUENCY: FREQUENCY: CONTINUOUS

## 2023-01-15 ASSESSMENT — ENCOUNTER SYMPTOMS
VOMITING: 0
ABDOMINAL PAIN: 0
NAUSEA: 1

## 2023-01-15 ASSESSMENT — PAIN DESCRIPTION - PAIN TYPE: TYPE: ACUTE PAIN

## 2023-01-15 ASSESSMENT — PAIN - FUNCTIONAL ASSESSMENT: PAIN_FUNCTIONAL_ASSESSMENT: 0-10

## 2023-01-15 ASSESSMENT — PAIN DESCRIPTION - LOCATION: LOCATION: GENERALIZED;FOOT

## 2023-01-15 NOTE — ED PROVIDER NOTES
[facility] ED     Emergency Department     Faculty Attestation    I performed a history and physical examination of the patient and discussed management with the resident. I reviewed the residents note and agree with the documented findings and plan of care. Any areas of disagreement are noted on the chart. I was personally present for the key portions of any procedures. I have documented in the chart those procedures where I was not present during the key portions. I have reviewed the emergency nurses triage note. I agree with the chief complaint, past medical history, past surgical history, allergies, medications, social and family history as documented unless otherwise noted below. For Physician Assistant/ Nurse Practitioner cases/documentation I have personally evaluated this patient and have completed at least one if not all key elements of the E/M (history, physical exam, and MDM). Additional findings are as noted. Rash on exam consistent with scabies we will treat. Patient also requesting Neurontin refill. However databank report shows that he filled it last week and is not out which he now recalls.   Will discharge      Critical Care     none    Shavon Howell MD, Nancy Sultana  Attending Emergency  Physician           Shavon Howell MD  01/15/23 6307

## 2023-01-15 NOTE — ED PROVIDER NOTES
[facility] ED  Emergency Department Encounter  Emergency Medicine Resident     Pt Name: Aniceto Martinez  MRN: [id number]  [place] 1990  Date of evaluation: 1/15/23  PCP:  Ashley Mcnamara MD    [address] [place] [address]       Chief Complaint   Patient presents with    Foot Pain    Rash       HISTORY OFPRESENT ILLNESS  (Location/Symptom, Timing/Onset, Context/Setting, Quality, Duration, Modifying Factors,Severity.)      Aniceto Martinez is a 28 y. o.yo male who presents with complaints of bilateral foot pain. Patient reports that he does have a history of diabetic neuropathy and was prescribed gabapentin however he ran out. We did check patient's chart and saw that he was recently prescribed gabapentin. When attending spoke to patient, at that time he did remember that he still had his gabapentin. He is also complaining of rashes that is over his bilateral hand that is worsened at nighttime. Denies any new detergent. Of note patient does live in a homeless shelter. Otherwise denies any fever, chills, shortness of breath, difficulty breathing, vomiting but states that he has mild nausea.     PAST MEDICAL / SURGICAL / SOCIAL / FAMILY HISTORY      has a past medical history of Anxiety, Arthritis, Asthma, Bipolar I disorder, most recent episode (or current) depressed, unspecified, Clostridium difficile infection, COPD (chronic obstructive pulmonary disease) ([address] Depression, Disease of blood and blood forming organ, Eczema, Fracture, metacarpal, Gastric ulcer, Gastritis, GERD (gastroesophageal reflux disease), GI bleed, H. 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 has a past surgical history that includes Colonoscopy; bronchoscopy; other surgical history; Upper gastrointestinal endoscopy (2/4/16); pr egd transoral biopsy single/multiple (N/A, 3/20/2017); sigmoidoscopy (N/A, 3/20/2017); Cholecystectomy, laparoscopic (07/14/2017); pr esophagogastroduodenoscopy transoral diagnostic (N/A, 8/9/2017); pr colonoscopy w/biopsy single/multiple (8/9/2017); Abdomen surgery; Upper gastrointestinal endoscopy (N/A, 6/13/2018); Upper gastrointestinal endoscopy (N/A, 9/20/2018); and Endoscopy, colon, diagnostic. Social History     Socioeconomic History    Marital status: Single     Spouse name: Not on file    Number of children: Not on file    Years of education: Not on file    Highest education level: Not on file   Occupational History    Occupation: disability   Tobacco Use    Smoking status: Every Day     Packs/day: 0.50     Years: 15.00     Pack years: 7.50     Types: Cigarettes    Smokeless tobacco: Never   Vaping Use    Vaping Use: Former   Substance and Sexual Activity    Alcohol use: Not Currently     Comment: drinks daily    Drug use: Not Currently     Types: Opiates , Cocaine     Comment: Hx of opiates, benzos, cocaine, alcohol abuse    Sexual activity: Yes     Partners: Female     Comment: Lives alone, not working.    Other Topics Concern    Not on file   Social History Narrative    ** Merged History Encounter **          Social Determinants of Health     Financial Resource Strain: Not on file   Food Insecurity: Not on file   Transportation Needs: Not on file   Physical Activity: Not on file   Stress: Not on file   Social Connections: Not on file   Intimate Partner Violence: Not on file   Housing Stability: Not on file       Family History   Problem Relation Age of Onset    Diabetes Father     Alcohol Abuse Father     Depression Father     Arthritis Father     High Blood Pressure Father     Other Father         aneurysm & epilepsy    Migraines Father     Arthritis Mother     Other Mother         aneurysm & epilepsy    Migraines Mother     Diabetes Brother         Aunt and uncles    Depression Brother     Mental Illness Brother     Other Brother         epilepsy    Migraines Brother     Stroke Other         Uncle    Other Brother         murdered Oct 6th, 2014    Colon Cancer Paternal Cousin 43    Other Sister         epilepsy    Migraines Sister         Allergies:  Dicyclomine, Famotidine, Geodon [ziprasidone hcl], Haloperidol, Iv dye [iodides], Reglan [metoclopramide], Ibuprofen, Aspirin, Dicyclomine hcl, Hydroxyzine hcl, Iodine, Metronidazole, Mirtazapine, Promethazine, and Ziprasidone    Home Medications:  Prior to Admission medications    Medication Sig Start Date End Date Taking? Authorizing Provider   permethrin (ELIMITE) 5 % cream Apply from neck down. Please leave on for 8 to 12 hours before washing it off. 1/15/23  Yes Danie Amezquita MD   triamcinolone (KENALOG) 0.1 % cream Apply topically 2 times daily for 1 week. 1/14/23   Chanelle Hubbard PA-C   acetaminophen (TYLENOL) 325 MG tablet Take 1 tablet by mouth every 6 hours as needed for Pain 1/12/23   Lyndol Medicine, DO   ALPRAZolam Becka Jenifer) 0.5 MG tablet  9/4/22   Historical Provider, MD   QUEtiapine (SEROQUEL) 100 MG tablet  10/31/22   Historical Provider, MD   tiZANidine (ZANAFLEX) 4 MG tablet Take 1 tablet by mouth every 6 hours as needed (muscle pain) 12/4/22   Cordelia Beck MD   hydrocortisone 1 % cream Apply topically 2 -3 times daily for 5 - 7 days.  12/4/22   Cordelia Beck MD   cloNIDine (CATAPRES) 0.1 MG tablet Take 1 tablet by mouth nightly for 3 days 7/14/22 7/17/22  Bharathi Actis, APRN - CNP   Zinc Oxide 6 % CREA Apply 1 applicator topically in the morning and at bedtime 7/3/22   E Rad Duran MD   ARIPiprazole (ABILIFY) 15 MG tablet Take 1 tablet by mouth daily 6/22/22 Usman Kelsey MD   cetirizine (ZYRTEC) 10 MG tablet Take 1 tablet by mouth daily 6/22/22   Usman Kelsey MD   meloxicam (MOBIC) 7.5 MG tablet Take 1 tablet by mouth 2 times daily as needed for Pain 8/19/21 10/30/21  Bhanu Ledezma APRN - CNP       REVIEW OFSYSTEMS    (2-9 systems for level 4, 10 or more for level 5)      Review of Systems   Constitutional:  Negative for fatigue. HENT:  Negative for congestion and drooling. Cardiovascular:  Negative for chest pain and leg swelling. Gastrointestinal:  Positive for nausea. Negative for abdominal pain and vomiting. Musculoskeletal:         Patient complains of bilateral neuropathic foot pain   Skin:  Positive for rash. Neurological:  Negative for headaches. Psychiatric/Behavioral:  Negative for behavioral problems and confusion. PHYSICAL EXAM   (up to 7 for level 4, 8 or more forlevel 5)      INITIAL VITALS:   ED Triage Vitals [01/15/23 0331]   BP Temp Temp Source Heart Rate Resp SpO2 Height Weight   113/70 97 °F (36.1 °C) Oral 84 16 100 % -- --       Physical Exam  Constitutional:       Appearance: Normal appearance. HENT:      Head: Normocephalic and atraumatic. Nose: Nose normal.      Mouth/Throat:      Mouth: Mucous membranes are moist.   Eyes:      Extraocular Movements: Extraocular movements intact. Pupils: Pupils are equal, round, and reactive to light. Cardiovascular:      Rate and Rhythm: Normal rate. Pulmonary:      Effort: Pulmonary effort is normal. No respiratory distress. Abdominal:      General: There is no distension. Palpations: Abdomen is soft. Tenderness: There is no abdominal tenderness. Musculoskeletal:         General: No swelling. Normal range of motion. Cervical back: Normal range of motion. Skin:     Capillary Refill: Capillary refill takes less than 2 seconds. Findings: Rash present. Comments: Rash representing scabies   Neurological:      General: No focal deficit present. Mental Status: He is alert. Psychiatric:         Mood and Affect: Mood normal.         Behavior: Behavior normal.       DIFFERENTIAL  DIAGNOSIS     PLAN (LABS / IMAGING / EKG):  No orders of the defined types were placed in this encounter. MEDICATIONS ORDERED:  Orders Placed This Encounter   Medications    cetirizine (ZYRTEC) tablet 10 mg    permethrin (ELIMITE) 5 % cream     Sig: Apply from neck down. Please leave on for 8 to 12 hours before washing it off. Dispense:  60 g     Refill:  0       Initial MDM/Plan: 28 y.o. male who presents with neuropathic pain in addition to rashes consistent with scabies. We will plan to discharge patient with permethrin cream.  Patient already has prescription for gabapentin. Patient to be discharged back to [facility]. DIAGNOSTIC RESULTS / EMERGENCYDEPARTMENT COURSE / MDM     LABS:  Labs Reviewed - No data to display      RADIOLOGY:  No results found. EKG      All EKG's are interpreted by the Emergency Department Physicianwho either signs or Co-signs this chart in the absence of a cardiologist.    EMERGENCY DEPARTMENT COURSE:          PROCEDURES:  None    CONSULTS:  None    CRITICAL CARE:      FINAL IMPRESSION      1. Neuropathic pain    2. Scabies          DISPOSITION / PLAN     DISPOSITION Decision To Discharge 01/15/2023 03:47:58 AM      PATIENT REFERRED TO:  [facility] ED  [address] [facility]  [phone]    If symptoms worsen    Angelica Oliva MD  [address] [place] [address]  [phone]      As needed    DISCHARGE MEDICATIONS:  Discharge Medication List as of 1/15/2023  4:04 AM        START taking these medications    Details   permethrin (ELIMITE) 5 % cream Apply from neck down.   Please leave on for 8 to 12 hours before washing it off., Disp-60 g, R-0, Print             Shelton Frederick MD  Emergency Medicine Resident    (Please note that portions of this note were completed with a voice recognition program.Efforts were made to edit the dictations but occasionally words are mis-transcribed.)        Dean Oneal MD  Resident  01/15/23 2885
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
## 2023-01-15 NOTE — DISCHARGE INSTRUCTIONS
You will be discharged with permethrin cream for your scabies rash. Please use as directed. You may repeat the treatment 1 week later. Return to emergency room if you do have worsening symptoms.

## 2023-01-15 NOTE — DISCHARGE INSTRUCTIONS
Take meds as prescribed. Take tylenol for pain. Follow up with doctor  in 3 -4 days.   Return to ER immediately if symptoms worsen or persist.
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
## 2023-01-15 NOTE — ED NOTES
Writer received call from [facility] who stated patient walked to their location & asked for ride to ED. Dionna Rose stated patient did not want an assessment or admission to their facility, he just wanted transport to the ED as he could not walk the rest of the way.       BARBARA Veliz  01/15/23 2912

## 2023-01-15 NOTE — ED NOTES
Pt presents to the ED via ambulatory to room with complaints of foot pain, nausea, boy aches  Pt denies any injury  Pt states he was just at [facility] with same complaints  Sent to zepf, zepf brought him here  Pt states pain 10/10 aching cont  Pt alert and oriented x4, talking in complete sentences, respirations even and unlabored. Pt acting age appropriate.  White board updated, will continue to plan of care        Astrid Maradiaga RN  01/15/23 8100

## 2023-01-16 ENCOUNTER — APPOINTMENT (OUTPATIENT)
Dept: GENERAL RADIOLOGY | Age: 33
End: 2023-01-16
Payer: MEDICARE

## 2023-01-16 ENCOUNTER — HOSPITAL ENCOUNTER (EMERGENCY)
Age: 33
Discharge: HOME | End: 2023-01-16
Attending: EMERGENCY MEDICINE
Payer: MEDICARE

## 2023-01-16 VITALS
RESPIRATION RATE: 16 BRPM | TEMPERATURE: 97.3 F | OXYGEN SATURATION: 100 % | HEART RATE: 75 BPM | SYSTOLIC BLOOD PRESSURE: 125 MMHG | DIASTOLIC BLOOD PRESSURE: 76 MMHG

## 2023-01-16 DIAGNOSIS — Z76.5 DRUG-SEEKING BEHAVIOR: ICD-10-CM

## 2023-01-16 DIAGNOSIS — R11.0 NAUSEA: Primary | ICD-10-CM

## 2023-01-16 LAB
CHP ED QC CHECK: YES
GLUCOSE BLD-MCNC: 105 MG/DL
GLUCOSE BLD-MCNC: 105 MG/DL (ref 75–110)

## 2023-01-16 PROCEDURE — 99282 EMERGENCY DEPT VISIT SF MDM: CPT

## 2023-01-16 PROCEDURE — 82947 ASSAY GLUCOSE BLOOD QUANT: CPT

## 2023-01-16 RX ORDER — PROMETHAZINE HYDROCHLORIDE 25 MG/ML
12.5 INJECTION, SOLUTION INTRAMUSCULAR; INTRAVENOUS ONCE
Status: DISCONTINUED | OUTPATIENT
Start: 2023-01-16 | End: 2023-01-16 | Stop reason: HOSPADM

## 2023-01-16 NOTE — ED PROVIDER NOTES
[facility]     Emergency Department     Faculty Attestation    I performed a history and physical examination of the patient and discussed management with the resident. I have reviewed and agree with the residents findings including all diagnostic interpretations, and treatment plans as written. Any areas of disagreement are noted on the chart. I was personally present for the key portions of any procedures. I have documented in the chart those procedures where I was not present during the key portions. I have reviewed the emergency nurses triage note. I agree with the chief complaint, past medical history, past surgical history, allergies, medications, social and family history as documented unless otherwise noted below. Documentation of the HPI, Physical Exam and Medical Decision Making performed by marlo is based on my personal performance of the HPI, PE and MDM. For Physician Assistant/ Nurse Practitioner cases/documentation I have personally evaluated this patient and have completed at least one if not all key elements of the E/M (history, physical exam, and MDM). Additional findings are as noted. 29 yo M just discharged & reports hemoptysis, no fever, no vomit, no cp, no suicidal or homicidal ideation, hx per pt  PE vss gcs 15 cong, no cervical tenderness, crepitus or deformity, chest symmetric, no sub q air,   Abdomen non tender no distension, no rigidity, no guarding,   -Patient is reluctant historian, patient not removing his heavy jacket for further evaluation/exam,    -we started eval, pt refusing cxr, & further eval & tx, pt appears to have decision making capacity, pt eloped,     EKG Interpretation    Interpreted by me      CRITICAL CARE: There was a high probability of clinically significant/life threatening deterioration in this patient's condition which required my urgent intervention. Total critical care time was 5 minutes.   This excludes any time for separately reportable procedures.        Dontrell Banks, DO  01/16/23 3201 [place] Dharmesh, DO  01/16/23 9660

## 2023-01-16 NOTE — ED NOTES
Pt presents to the Ed via ambulatory to room with complaints of coughing up blood  Pt has been seen in the Ed multiple times in the past few days  Pt states coughing ongoing for past 4 days  Writer heard no cough and no evidence of blood  Pt alert and oriented x4, talking in complete sentences, respirations even and unlabored. Pt acting age appropriate.  White board updated, will continue to plan of care      Alma Valdovinos RN  01/16/23 6183
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
## 2023-01-16 NOTE — ED PROVIDER NOTES
-Dr Cruz Led saw pt / I did not see pt on this encounter     Toyin Abernathy, DO  01/15/23 1521 [facility], DO  01/16/23 8193

## 2023-01-16 NOTE — DISCHARGE INSTRUCTIONS
You are seen and evaluated emergency department for small mount of blood in your vomitus. You had no episodes of emesis while in the emergency department. You were treated with Phenergan and had ongoing resolution of symptoms. Please follow-up with your primary care as soon as possible for ongoing management of your symptoms. If you begin to experience worsening blood in your vomitus, please return to the emergency department for further evaluation.

## 2023-01-16 NOTE — ED PROVIDER NOTES
[facility] ED  Emergency Department Encounter  Emergency Medicine Resident     Pt Timothy Xiong  MRN: [id number]  [place] 1990  Date of evaluation: 1/16/23  PCP:  Victor Hugo Hill MD  Note Started: 1:29 AM EST      CHIEF COMPLAINT       Chief Complaint   Patient presents with    Hemoptysis     Pt states coughing up blood for 4 days       HISTORY OF PRESENT ILLNESS  (Location/Symptom, Timing/Onset, Context/Setting, Quality, Duration, Modifying Factors, Severity.)      Andres Alejandre is a 28 y.o. male who presents with complaint of multiple days of vomiting blood. Patient was previously seen in the emergency department few hours ago and had no complaints of abdominal pain or vomiting. He left without being treated and said he was headed back to [facility]. Patient says when he got there he was turned away as he reported some vomiting and they requested he go back to the hospital for evaluation. Patient says he has not been able to eat or drink anything for the past 3 days without vomiting. Patient has been asked to take his jacket off and is very hesitant to do so, ultimately refusing full physical exam.  Endorsing mild abdominal pain but no constipation or diarrhea.   PAST MEDICAL / SURGICAL / SOCIAL / FAMILY HISTORY      has a past medical history of Anxiety, Arthritis, Asthma, Bipolar I disorder, most recent episode (or current) depressed, unspecified, Clostridium difficile infection, COPD (chronic obstructive pulmonary disease) ([address] Depression, Disease of blood and blood forming organ, Eczema, Fracture, metacarpal, Gastric ulcer, Gastritis, GERD (gastroesophageal reflux disease), GI bleed, H. pylori infection, H/O blood clots, Head injury, Headache, Insomnia, Juvenile rheumatoid arthritis ([address] Neuromuscular disorder ([address] PFAPA syndrome ([address] PUD (peptic ulcer disease), Rheumatoid arthritis ([address] Rheumatoid arthritis(714.0), Severe recurrent major depression without psychotic features (HCC), Sleep apnea, 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HCC), Substance abuse (HCC), Suicidal ideation, Suicide attempt by hanging (HCC), Tobacco dependence, Ulcerative colitis (HCC), and UTI (urinary tract infection).     has a past surgical history that includes Colonoscopy; bronchoscopy; other surgical history; Upper gastrointestinal endoscopy (2/4/16); pr egd transoral biopsy single/multiple (N/A, 3/20/2017); sigmoidoscopy (N/A, 3/20/2017); Cholecystectomy, laparoscopic (07/14/2017); pr esophagogastroduodenoscopy transoral diagnostic (N/A, 8/9/2017); pr colonoscopy w/biopsy single/multiple (8/9/2017); Abdomen surgery; Upper gastrointestinal endoscopy (N/A, 6/13/2018); Upper gastrointestinal endoscopy (N/A, 9/20/2018); and Endoscopy, colon, diagnostic.    Social History     Socioeconomic History    Marital status: Single     Spouse name: Not on file    Number of children: Not on file    Years of education: Not on file    Highest education level: Not on file   Occupational History    Occupation: disability   Tobacco Use    Smoking status: Every Day     Packs/day: 0.50     Years: 15.00     Pack years: 7.50     Types: Cigarettes    Smokeless tobacco: Never   Vaping Use    Vaping Use: Former   Substance and Sexual Activity    Alcohol use: Not Currently     Comment: drinks daily    Drug use: Not Currently     Types: Opiates , Cocaine     Comment: Hx of opiates, benzos, cocaine, alcohol abuse    Sexual activity: Yes     Partners: Female     Comment: Lives alone, not working.   Other Topics Concern    Not on file   Social History Narrative    ** Merged History Encounter **          Social Determinants of Health     Financial Resource Strain: Not on file   Food Insecurity: Not on file   Transportation Needs: Not on file   Physical Activity: Not on file   Stress: Not on file   Social Connections: Not on file   Intimate Partner Violence: Not on file   Housing Stability: Not on file       Family History   Problem  Relation Age of Onset    Diabetes Father     Alcohol Abuse Father     Depression Father     Arthritis Father     High Blood Pressure Father     Other Father         aneurysm & epilepsy    Migraines Father     Arthritis Mother     Other Mother         aneurysm & epilepsy    Migraines Mother     Diabetes Brother         Aunt and uncles    Depression Brother     Mental Illness Brother     Other Brother         epilepsy    Migraines Brother     Stroke Other         Uncle    Other Brother         murdered Oct 6th, 2014    Colon Cancer Paternal Cousin 43    Other Sister         epilepsy    Migraines Sister        Allergies:  Dicyclomine, Famotidine, Geodon [ziprasidone hcl], Haloperidol, Iv dye [iodides], Reglan [metoclopramide], Ibuprofen, Aspirin, Dicyclomine hcl, Hydroxyzine hcl, Iodine, Metronidazole, Mirtazapine, Promethazine, and Ziprasidone    Home Medications:  Prior to Admission medications    Medication Sig Start Date End Date Taking? Authorizing Provider   permethrin (ELIMITE) 5 % cream Apply from neck down. Please leave on for 8 to 12 hours before washing it off. 1/15/23   Andrea Antonio MD   triamcinolone (KENALOG) 0.1 % cream Apply topically 2 times daily for 1 week. 1/14/23   Luís Palacios PA-C   acetaminophen (TYLENOL) 325 MG tablet Take 1 tablet by mouth every 6 hours as needed for Pain 1/12/23   Margot Ramirez DO   ALPRAZolam Jennet Gram) 0.5 MG tablet  9/4/22   Historical Provider, MD   QUEtiapine (SEROQUEL) 100 MG tablet  10/31/22   Historical Provider, MD   tiZANidine (ZANAFLEX) 4 MG tablet Take 1 tablet by mouth every 6 hours as needed (muscle pain) 12/4/22   Man Andre MD   hydrocortisone 1 % cream Apply topically 2 -3 times daily for 5 - 7 days.  12/4/22   Man Andre MD   cloNIDine (CATAPRES) 0.1 MG tablet Take 1 tablet by mouth nightly for 3 days 7/14/22 7/17/22  MASSIEL Ortiz - CNP   Zinc Oxide 6 % CREA Apply 1 applicator topically in the morning and at bedtime 7/3/22   Raul Oneill MD   ARIPiprazole (ABILIFY) 15 MG tablet Take 1 tablet by mouth daily 6/22/22   Myrna Martin MD   cetirizine (ZYRTEC) 10 MG tablet Take 1 tablet by mouth daily 6/22/22   Myrna Martin MD   meloxicam (MOBIC) 7.5 MG tablet Take 1 tablet by mouth 2 times daily as needed for Pain 8/19/21 10/30/21  MASSIEL Mcallister - CNP         REVIEW OF SYSTEMS     As stated in HPI    PHYSICAL EXAM      INITIAL VITALS:   /76   Pulse 75   Temp 97.3 °F (36.3 °C) (Oral)   Resp 16   SpO2 100%     Physical Exam  Vitals reviewed. Constitutional:       General: He is not in acute distress. Appearance: Normal appearance. HENT:      Nose: Nose normal.      Mouth/Throat:      Mouth: Mucous membranes are moist.      Pharynx: Oropharynx is clear. Eyes:      Extraocular Movements: Extraocular movements intact. Conjunctiva/sclera: Conjunctivae normal.      Pupils: Pupils are equal, round, and reactive to light. Cardiovascular:      Rate and Rhythm: Normal rate and regular rhythm. Pulses: Normal pulses. Pulmonary:      Effort: Pulmonary effort is normal.      Breath sounds: Normal breath sounds. Abdominal:      General: Abdomen is flat. There is no distension. Tenderness: There is no abdominal tenderness. There is no guarding or rebound. Musculoskeletal:      Cervical back: Normal range of motion and neck supple. Skin:     General: Skin is warm and dry. Capillary Refill: Capillary refill takes less than 2 seconds. Neurological:      General: No focal deficit present. Mental Status: He is alert and oriented to person, place, and time. DDX/DIAGNOSTIC RESULTS / EMERGENCY DEPARTMENT COURSE / MDM     Medical Decision Making  80-year-old male, history of schizophrenia, just discharged from the emergency department 1 hour ago for foot pain, now with complaints of hemoptysis and bloody emesis for the past 3 days. Vitals within normal limits.   Patient has had no dry heaving or emesis during last encounter or since arrival.  Does not appear to be anemic or dehydrated. Patient endorsing going back to [facility] since discharge, however timeline does not correlate as he did not obtain a cab from social work. Patient is over 9 emergency department visits over the past 4 days and has refused care multiple of them. High likelihood that patient is only seeking medication as he has a history of this and has a relatively benign physical exam.  We will plan to order chest x-ray and point-of-care glucose to evaluate. Amount and/or Complexity of Data Reviewed  External Data Reviewed: labs. Labs: ordered. Radiology: ordered. Risk  Prescription drug management. EMERGENCY DEPARTMENT COURSE:    ED Course as of 01/16/23 0217   Mon Jan 16, 2023   0128 Patient is refusing chest x-ray. Discussed with patient need for x-ray to evaluate his symptoms. He is requesting some medication to help with his nausea and does not want to get any further work-up. He understands that we will not be able to evaluate for concerning pathology. He is wanting to leave without being fully worked up. [DH]   0214 Patient left prior to receiving medication or paperwork. [DH]      ED Course User Index  [DH] Reta Xiong MD       FINAL IMPRESSION      1. Nausea    2.  Drug-seeking behavior          DISPOSITION / PLAN     DISPOSITION Decision To Discharge 01/16/2023 02:06:03 AM      PATIENT REFERRED TO:  Angelica Oliva MD  [facility] [address] [address]  [phone]    Call on 1/16/2023      DISCHARGE MEDICATIONS:  Discharge Medication List as of 1/16/2023  2:08 AM          Reta Xiong MD  Emergency Medicine Resident    (Please note that portions of thisnote were completed with a voice recognition program.  Efforts were made to edit the dictations but occasionally words are mis-transcribed.)        Reta Xiong MD  Resident  01/16/23 3020

## 2023-01-16 NOTE — ED NOTES
Patient to triage stating he was brought to ED from [facility] for medication for scabies. Patient ambulated to room 29 from triage. Patient in room with Dr Gerardo Rueda at bedside to evaluate patient. Patient audibly crying but no tears stating his foot is so painful. When questioned about foot patient states it has been hurting for a while but feels broken and he has a rash and wants a shot of benadryl. Patient a/o x 4 with even non labored respirations.  Will continue to monitor     Diane Trevino LPN  32/79/56 [id number]

## 2023-01-16 NOTE — ED NOTES
Patient ambulated into hallway asking if doctor was going to return to room. Patient ambulated with steady gait and did not appear to be in any distress. Writer informed patient doctor would return and he can just wait in room for him.      Krystina Garcia LPN  12/55/19 9429

## 2023-01-16 NOTE — ED PROVIDER NOTES
[facility] ED  Emergency Department Encounter  Emergency Medicine Resident     Pt Vince Balbuena  MRN: [id number]  [place] 1990  Date of evaluation: 1/15/23  PCP:  Kev Calderón MD  Note Started: 10:03 PM EST      CHIEF COMPLAINT       Chief Complaint   Patient presents with    Foot Pain    Pruritis     HISTORY OF PRESENT ILLNESS  (Location/Symptom, Timing/Onset, Context/Setting, Quality, Duration, Modifying Factors, Severity.)      Martita Marte is a 28 y.o. male who presents with foot pain and itching. Says he has pain in his right foot which has been worsening for the past few days. Says it started as a sore and had some discharge. He was putting Band-Aids on it and it has since healed over and become hard. Says it remains very painful to touch and causes him to have difficulty ambulating. Denies any fever or chills. He is also endorsing itching all over causing him to scratch his skin until sores start. Is requesting a Benadryl shot that helped clear up his itching. Patient was seen 5 times at different emergency departments yesterday. He was prescribed gabapentin and permethrin earlier this morning for concern of scabies. He was unable to  permethrin and claims to be out of gabapentin. Patient states he will be getting paid this week and just needs some medications until he can purchase his prescriptions at the pharmacy.     PAST MEDICAL / SURGICAL / SOCIAL / FAMILY HISTORY      has a past medical history of Anxiety, Arthritis, Asthma, Bipolar I disorder, most recent episode (or current) depressed, unspecified, Clostridium difficile infection, COPD (chronic obstructive pulmonary disease) ([address] Depression, Disease of blood and blood forming organ, Eczema, Fracture, metacarpal, Gastric ulcer, Gastritis, GERD (gastroesophageal reflux disease), GI bleed, H. pylori infection, H/O blood clots, Head injury, Headache, Insomnia, Juvenile rheumatoid arthritis ([facility] Neuromuscular disorder ([facility] PFAPA syndrome ([facility] PUD (peptic ulcer disease), Rheumatoid arthritis ([facility] Rheumatoid arthritis(714.0), Severe recurrent major depression without psychotic features ([facility] Sleep apnea, 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[facility] [address] [facility] Substance abuse ([facility] Suicidal ideation, Suicide attempt by hanging ([facility] Tobacco dependence, Ulcerative colitis ([facility] and UTI (urinary tract infection). has a past surgical history that includes Colonoscopy; bronchoscopy; other surgical history; Upper gastrointestinal endoscopy (2/4/16); pr egd transoral biopsy single/multiple (N/A, 3/20/2017); sigmoidoscopy (N/A, 3/20/2017); Cholecystectomy, laparoscopic (07/14/2017); pr esophagogastroduodenoscopy transoral diagnostic (N/A, 8/9/2017); pr colonoscopy w/biopsy single/multiple (8/9/2017); Abdomen surgery; Upper gastrointestinal endoscopy (N/A, 6/13/2018); Upper gastrointestinal endoscopy (N/A, 9/20/2018); and Endoscopy, colon, diagnostic. Social History     Socioeconomic History    Marital status: Single     Spouse name: Not on file    Number of children: Not on file    Years of education: Not on file    Highest education level: Not on file   Occupational History    Occupation: disability   Tobacco Use    Smoking status: Every Day     Packs/day: 0.50     Years: 15.00     Pack years: 7.50     Types: Cigarettes    Smokeless tobacco: Never   Vaping Use    Vaping Use: Former   Substance and Sexual Activity    Alcohol use: Not Currently     Comment: drinks daily    Drug use: Not Currently     Types: Opiates , Cocaine     Comment: Hx of opiates, benzos, cocaine, alcohol abuse    Sexual activity: Yes     Partners: Female     Comment: Lives alone, not working.    Other Topics Concern    Not on file   Social History Narrative    ** Merged History Encounter **          Social Determinants of Health     Financial Resource Strain: Not on file   Food Insecurity: Not on file   Transportation Needs: Not on file   Physical Activity: Not on file   Stress: Not on file   Social Connections: Not on file   Intimate Partner Violence: Not on file   Housing Stability: Not on file       Family History   Problem Relation Age of Onset    Diabetes Father     Alcohol Abuse Father     Depression Father     Arthritis Father     High Blood Pressure Father     Other Father         aneurysm & epilepsy    Migraines Father     Arthritis Mother     Other Mother         aneurysm & epilepsy    Migraines Mother     Diabetes Brother         Aunt and uncles    Depression Brother     Mental Illness Brother     Other Brother         epilepsy    Migraines Brother     Stroke Other         Uncle    Other Brother         murdered Oct 6th, 2014    Colon Cancer Paternal Cousin 43    Other Sister         epilepsy    Migraines Sister        Allergies:  Dicyclomine, Famotidine, Geodon [ziprasidone hcl], Haloperidol, Iv dye [iodides], Reglan [metoclopramide], Ibuprofen, Aspirin, Dicyclomine hcl, Hydroxyzine hcl, Iodine, Metronidazole, Mirtazapine, Promethazine, and Ziprasidone    Home Medications:  Prior to Admission medications    Medication Sig Start Date End Date Taking? Authorizing Provider   permethrin (ELIMITE) 5 % cream Apply from neck down. Please leave on for 8 to 12 hours before washing it off. 1/15/23   Andrea Antonio MD   triamcinolone (KENALOG) 0.1 % cream Apply topically 2 times daily for 1 week. 1/14/23   Luís Palacios PA-C   acetaminophen (TYLENOL) 325 MG tablet Take 1 tablet by mouth every 6 hours as needed for Pain 1/12/23   Margot Ramirez DO   ALPRAZolam Jennet Gram) 0.5 MG tablet  9/4/22   Historical Provider, MD   QUEtiapine (SEROQUEL) 100 MG tablet  10/31/22   Historical Provider, MD   tiZANidine (ZANAFLEX) 4 MG tablet Take 1 tablet by mouth every 6 hours as needed (muscle pain) 12/4/22   Mavis Swanson MD   hydrocortisone 1 % cream Apply topically 2 -3 times daily for 5 - 7 days.  12/4/22   Mavis Swanson MD   cloNIDine (CATAPRES) 0.1 MG tablet Take 1 tablet by mouth nightly for 3 days 7/14/22 7/17/22  MASSIEL Batista - CNP   Zinc Oxide 6 % CREA Apply 1 applicator topically in the morning and at bedtime 7/3/22   CHER Morel MD   ARIPiprazole (ABILIFY) 15 MG tablet Take 1 tablet by mouth daily 6/22/22   Jam Prado MD   cetirizine (ZYRTEC) 10 MG tablet Take 1 tablet by mouth daily 6/22/22   Jam Prado MD   meloxicam (MOBIC) 7.5 MG tablet Take 1 tablet by mouth 2 times daily as needed for Pain 8/19/21 10/30/21  MASSIEL East CNP       REVIEW OF SYSTEMS     As stated in HPI    PHYSICAL EXAM      INITIAL VITALS:   /74   Pulse 75   Resp 14   SpO2 97%     Physical Exam  Constitutional:       General: He is not in acute distress. Appearance: Normal appearance. Musculoskeletal:      Comments: Callus that lateral aspect of right foot, no fluctuance, no surrounding erythema, mildly tender   Skin:     General: Skin is warm and dry. Capillary Refill: Capillary refill takes less than 2 seconds. Comments: Multiple small lesions in various stages of healing over bilateral upper and lower extremities associated with excoriations, no surrounding erythema, no discharge. Neurological:      Mental Status: He is alert and oriented to person, place, and time. Comments: Motor and sensation intact to bilateral distal extremities         DDX/DIAGNOSTIC RESULTS / EMERGENCY DEPARTMENT COURSE / MDM     Medical Decision Making  44-year-old male, history of diabetes, extensive psychiatric history, scabies, prescribed permethrin earlier this morning in the emergency department, multiple notes suggesting drug-seeking behavior, now with right foot pain and diffuse itching. Vital signs within normal limits. Exam notable for bilateral upper and lower extremity excoriations with superficial lesions but no evidence of infection.   Right foot with lesion concerning for normal callus without evidence of infection or underlying abscess. Discussed use of gabapentin with patient that it can take up to 2 weeks to start working and he should continue taking this and follow-up with his primary care provider if an increase in dosing is indicated. Patient is requesting Benadryl. He is insistent that he just needs a little something for his itching to get him through until he can get the permethrin. He does have a history of schizophrenia on Seroquel and Abilify, however most recent QTC was within normal limits. We will plan to give 25 mg Benadryl for itching as this is agreeable with the patient and he will plan to follow-up with his primary care provider when he completes his initial course of gabapentin 300 mg 3 times daily. Amount and/or Complexity of Data Reviewed  External Data Reviewed: ECG. Risk  OTC drugs. EMERGENCY DEPARTMENT COURSE:  ED Course as of 01/15/23 2327   John YarbroughBhanu Pancho 15, 2023   2318 Patient went out to the waiting room to call a friend. Nurse said he needs to come back to the room or else he will have to sign out as eloped. Patient said he will not come back as he is waiting for his cab. Social work has not called for a cab yet. [DH]   8843 Patient was seen returning from the waiting room requesting his discharge paperwork. Offered Benadryl he said he did not want to take it. He said he needed to go he just wanted his discharge paperwork. Paperwork was provided and patient left. [DH]      ED Course User Index  [DH] Gomez West MD           FINAL IMPRESSION      1. Scabies    2.  Neuropathic pain          DISPOSITION / PLAN     DISPOSITION Decision To Discharge 01/15/2023 10:58:51 PM      PATIENT REFERRED TO:  Faviola Schneider MD  [facility] [address] [id number]  [phone]    Call on 1/16/2023  For management of chronic neuropathic pain    [facility]  [address] [place] [address]  [phone]  Go to   If symptoms worsen    DISCHARGE MEDICATIONS:  Discharge Medication List as of 1/15/2023 11:02 PM          Erika Graves MD  Emergency Medicine Resident    (Please note that portions of thisnote were completed with a voice recognition program.  Efforts were made to edit the dictations but occasionally words are mis-transcribed.)       Erika Graves MD  Resident  01/15/23 5501

## 2023-01-16 NOTE — ED NOTES
Nurse to room to obtain patient's temperature. Patient refusing stating \" I dont got no fever. \" Nurse informed that we need to take his temperature as he said he was having hot and cold flashes and patient states \"they already took my tempturature. \" Patient becoming agitated stating \"I want to talk to my doctor\" and calling nurse a bitch. Physician notified.      [place]la Oter[place], LEONAN  14/88/92 8809

## 2023-01-16 NOTE — DISCHARGE INSTRUCTIONS
You are seen and evaluated the emergency department for itching due to scabies. You were given Benadryl and your symptoms improved. You should plan to  permethrin at the pharmacy as soon as possible and use as prescribed. Continue taking gabapentin as prescribed and follow-up with primary care provider to discuss increasing dose if this dose is not sufficient for pain management. If you begin to experience significant shortness of breath, chest pain, fever, inability to tolerate oral intake for more than 24 hours, he should return emergency department for further evaluation.

## 2023-01-17 ENCOUNTER — HOSPITAL ENCOUNTER (EMERGENCY)
Age: 33
Discharge: LEFT BEFORE DISCHARGE | End: 2023-01-17
Attending: EMERGENCY MEDICINE
Payer: MEDICARE

## 2023-01-17 ENCOUNTER — APPOINTMENT (OUTPATIENT)
Dept: GENERAL RADIOLOGY | Age: 33
End: 2023-01-17
Payer: MEDICARE

## 2023-01-17 VITALS
SYSTOLIC BLOOD PRESSURE: 149 MMHG | HEART RATE: 97 BPM | TEMPERATURE: 98.7 F | OXYGEN SATURATION: 89 % | DIASTOLIC BLOOD PRESSURE: 81 MMHG | RESPIRATION RATE: 20 BRPM

## 2023-01-17 DIAGNOSIS — M79.673 PAIN OF FOOT, UNSPECIFIED LATERALITY: Primary | ICD-10-CM

## 2023-01-17 PROCEDURE — 99283 EMERGENCY DEPT VISIT LOW MDM: CPT

## 2023-01-17 RX ORDER — ACETAMINOPHEN 325 MG/1
650 TABLET ORAL ONCE
Status: DISCONTINUED | OUTPATIENT
Start: 2023-01-17 | End: 2023-01-17 | Stop reason: HOSPADM

## 2023-01-17 RX ORDER — CETIRIZINE HYDROCHLORIDE 10 MG/1
10 TABLET ORAL ONCE
Status: DISCONTINUED | OUTPATIENT
Start: 2023-01-17 | End: 2023-01-17 | Stop reason: HOSPADM

## 2023-01-17 ASSESSMENT — ENCOUNTER SYMPTOMS
SHORTNESS OF BREATH: 0
ABDOMINAL PAIN: 1
BACK PAIN: 0
FACIAL SWELLING: 0

## 2023-01-17 NOTE — ED PROVIDER NOTES
[id number] [facility]     Emergency Department     Faculty Attestation    I performed a history and physical examination of the patient and discussed management with the resident. I reviewed the residents note and agree with the documented findings and plan of care. Any areas of disagreement are noted on the chart. I was personally present for the key portions of any procedures. I have documented in the chart those procedures where I was not present during the key portions. I have reviewed the emergency nurses triage note. I agree with the chief complaint, past medical history, past surgical history, allergies, medications, social and family history as documented unless otherwise noted below. For Physician Assistant/ Nurse Practitioner cases/documentation I have personally evaluated this patient and have completed at least one if not all key elements of the E/M (history, physical exam, and MDM). Additional findings are as noted. I have personally seen and evaluated the patient. I find the patient's history and physical exam are consistent with the NP/PA documentation. I agree with the care provided, treatment rendered, disposition and follow-up plan. 72-year-old male presenting with multiple concerns. Initially stated to triage that he was having abdominal pain, but denies this to me. He states that he is having itching and cannot sleep. He is also looking for refill of his gabapentin. He had foot pain for the resident, will not discuss how he injured it. Exam:  General : Sitting on the bed and in no acute distress  CV : normal rate and regular rhythm  Lungs : Breathing comfortably on room air with no tachypnea, hypoxia, or increased work of breathing. Patient reading hypoxic on oxygen saturation for nursing staff, however no good Pleth, will not sit so long enough to get an accurate reading.   Patient is ambulating around the department without difficulty and with no signs of dyspnea. Abdomen : soft, non-tender, non-distended  Skin: Small lesions on the wrists and ankles, with excoriations. Plan:  X-ray foot to rule out fracture  PDMP reviewed, patient has filled a 14-day prescription for gabapentin on 1/6. As such, I will not refill this at this time. Recommend that he follow-up with his PCP.   Zyrtec given for itching, possible bedbugs or other insect borne pruritic rash    Mindi Hudson MD   Attending Emergency Physician    (Please note that portions of this note were completed with a voice recognition program. Efforts were made to edit the dictations but occasionally words are mis-transcribed.)           Mindi Hudson MD  01/17/23 [id number]

## 2023-01-18 NOTE — ED PROVIDER NOTES
[facility] ED  Emergency Department Encounter  Emergency Medicine Resident     Pt Cherry Hoit Mauricio Hodgkins  MRN: [id number]  [place] 1990  Date of evaluation: 1/17/23  PCP:  Adama Ruff MD  Note Started: 7:19 PM EST      CHIEF COMPLAINT     Abdominal pain    HISTORY OF PRESENT ILLNESS  (Location/Symptom, Timing/Onset, Context/Setting, Quality, Duration, Modifying Factors, Severity.)      Bahman Tapia is a 28 y.o. male who presents with chronic abdominal pain, nausea vomiting. Patient reports that he has right foot pain and has had been having difficulty walking. Patient has not taken any medications for this. He reports that pain is 10 out of 10. Patient is requesting pain medications and Benadryl IV. Patient very difficult to redirect. PAST MEDICAL / SURGICAL / SOCIAL / FAMILY HISTORY      has a past medical history of Anxiety, Arthritis, Asthma, Bipolar I disorder, most recent episode (or current) depressed, unspecified, Clostridium difficile infection, COPD (chronic obstructive pulmonary disease) ([address] Depression, Disease of blood and blood forming organ, Eczema, Fracture, metacarpal, Gastric ulcer, Gastritis, GERD (gastroesophageal reflux disease), GI bleed, H. pylori infection, H/O blood clots, Head injury, Headache, Insomnia, Juvenile rheumatoid arthritis ([address] Neuromuscular disorder ([address] PFAPA syndrome ([address] PUD (peptic ulcer disease), Rheumatoid arthritis ([address] Rheumatoid arthritis(714.0), Severe recurrent major depression without psychotic features ([address] Sleep apnea, 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[address] Substance abuse ([address] Suicidal ideation, Suicide attempt by hanging ([address] Tobacco dependence, Ulcerative colitis ([address] and UTI (urinary tract infection). has a past surgical history that includes Colonoscopy; bronchoscopy; other surgical history;  Upper gastrointestinal endoscopy (2/4/16); pr egd transoral biopsy single/multiple (N/A, 3/20/2017); sigmoidoscopy (N/A, 3/20/2017); Cholecystectomy, laparoscopic (07/14/2017); pr esophagogastroduodenoscopy transoral diagnostic (N/A, 8/9/2017); pr colonoscopy w/biopsy single/multiple (8/9/2017); Abdomen surgery; Upper gastrointestinal endoscopy (N/A, 6/13/2018); Upper gastrointestinal endoscopy (N/A, 9/20/2018); and Endoscopy, colon, diagnostic. Social History     Socioeconomic History    Marital status: Single     Spouse name: Not on file    Number of children: Not on file    Years of education: Not on file    Highest education level: Not on file   Occupational History    Occupation: disability   Tobacco Use    Smoking status: Every Day     Packs/day: 0.50     Years: 15.00     Pack years: 7.50     Types: Cigarettes    Smokeless tobacco: Never   Vaping Use    Vaping Use: Former   Substance and Sexual Activity    Alcohol use: Not Currently     Comment: drinks daily    Drug use: Not Currently     Types: Opiates , Cocaine     Comment: Hx of opiates, benzos, cocaine, alcohol abuse    Sexual activity: Yes     Partners: Female     Comment: Lives alone, not working.    Other Topics Concern    Not on file   Social History Narrative    ** Merged History Encounter **          Social Determinants of Health     Financial Resource Strain: Not on file   Food Insecurity: Not on file   Transportation Needs: Not on file   Physical Activity: Not on file   Stress: Not on file   Social Connections: Not on file   Intimate Partner Violence: Not on file   Housing Stability: Not on file       Family History   Problem Relation Age of Onset    Diabetes Father     Alcohol Abuse Father     Depression Father     Arthritis Father     High Blood Pressure Father     Other Father         aneurysm & epilepsy    Migraines Father     Arthritis Mother     Other Mother         aneurysm & epilepsy    Migraines Mother     Diabetes Brother         Aunt and uncles    Depression Brother     Mental Illness Brother     Other Brother         epilepsy    Migraines Brother Stroke Other         Uncle    Other Brother         murdered Oct 6th, 2014    Colon Cancer Paternal Cousin 43    Other Sister         epilepsy    Migraines Sister        Allergies:  Dicyclomine, Famotidine, Geodon [ziprasidone hcl], Haloperidol, Iv dye [iodides], Reglan [metoclopramide], Ibuprofen, Aspirin, Dicyclomine hcl, Hydroxyzine hcl, Iodine, Metronidazole, Mirtazapine, Promethazine, and Ziprasidone    Home Medications:  Prior to Admission medications    Medication Sig Start Date End Date Taking? Authorizing Provider   permethrin (ELIMITE) 5 % cream Apply from neck down. Please leave on for 8 to 12 hours before washing it off. 1/15/23   Andrea Antonio MD   triamcinolone (KENALOG) 0.1 % cream Apply topically 2 times daily for 1 week. 1/14/23   Charmayne Roys, PA-C   acetaminophen (TYLENOL) 325 MG tablet Take 1 tablet by mouth every 6 hours as needed for Pain 1/12/23   Lory Riedel, DO   ALPRAZolam [employer] [employer]) 0.5 MG tablet  9/4/22   Historical Provider, MD   QUEtiapine (SEROQUEL) 100 MG tablet  10/31/22   Historical Provider, MD   tiZANidine (ZANAFLEX) 4 MG tablet Take 1 tablet by mouth every 6 hours as needed (muscle pain) 12/4/22   Tammy Toledo MD   hydrocortisone 1 % cream Apply topically 2 -3 times daily for 5 - 7 days. 12/4/22   Tammy Toledo MD   cloNIDine (CATAPRES) 0.1 MG tablet Take 1 tablet by mouth nightly for 3 days 7/14/22 7/17/22  MASSIEL Aguilar - CNP   Zinc Oxide 6 % CREA Apply 1 applicator topically in the morning and at bedtime 7/3/22   CHER Wheat MD   ARIPiprazole (ABILIFY) 15 MG tablet Take 1 tablet by mouth daily 6/22/22   Maddie Aguillon MD   cetirizine (ZYRTEC) 10 MG tablet Take 1 tablet by mouth daily 6/22/22   Maddie Aguillon MD   meloxicam (MOBIC) 7.5 MG tablet Take 1 tablet by mouth 2 times daily as needed for Pain 8/19/21 10/30/21  [place] Elda, APRN - CNP       REVIEW OF SYSTEMS       Review of Systems   Constitutional:  Negative for appetite change. HENT:  Negative for facial swelling. Respiratory:  Negative for shortness of breath. Cardiovascular:  Negative for chest pain. Gastrointestinal:  Positive for abdominal pain. Musculoskeletal:  Positive for gait problem. Negative for back pain. Skin:  Negative for wound. Allergic/Immunologic:        Reports multiple allergies     Neurological:  Negative for headaches. Hematological:         - AC   Psychiatric/Behavioral:  Negative for confusion. PHYSICAL EXAM      INITIAL VITALS:   BP (!) 149/81   Pulse 97   Temp 98.7 °F (37.1 °C) (Oral)   Resp 20   SpO2 (!) 89%     Physical Exam  Constitutional:       General: He is not in acute distress. Appearance: He is not ill-appearing. Comments: Disheveled, trying to sleep, easy to awake, answers questions   HENT:      Head: Normocephalic and atraumatic. Right Ear: External ear normal.      Left Ear: External ear normal.      Nose: Nose normal.   Eyes:      Conjunctiva/sclera: Conjunctivae normal.   Cardiovascular:      Rate and Rhythm: Normal rate. Pulses: Normal pulses. Pulmonary:      Effort: Pulmonary effort is normal. No respiratory distress. Abdominal:      General: Abdomen is flat. There is no distension. Palpations: Abdomen is soft. Tenderness: There is no abdominal tenderness. There is no guarding or rebound. Musculoskeletal:         General: No swelling. Cervical back: No tenderness. Comments: Right  to palpation   Skin:     General: Skin is warm. Capillary Refill: Capillary refill takes less than 2 seconds. Neurological:      Mental Status: He is oriented to person, place, and time. Psychiatric:      Comments: Reports he has mental health problems, difficult to redirect         DDX/DIAGNOSTIC RESULTS / [address] [place] [address] / [facility]     Medical Decision Making  Reviewed patient's chart, does appear to have a history of chronic abdominal pain.   The foot pain is new and the gait disturbance is also new. Patient seems shuffling in the ER. An x-ray was ordered and offered Tylenol and Zyrtec. Amount and/or Complexity of Data Reviewed  External Data Reviewed: notes. Risk  OTC drugs. EMERGENCY DEPARTMENT COURSE:  Patient eloped from the emergency department prior to treatment and x-ray. FINAL IMPRESSION      1.  Pain of foot, unspecified laterality          DISPOSITION / PLAN     DISPOSITION Eloped - Left Before Treatment Complete 01/17/2023 06:49:25 PM      Shaun Vargas DO  Emergency Medicine Resident    (Please note that portions of thisnote were completed with a voice recognition program.  Efforts were made to edit the dictations but occasionally words are mis-transcribed.)       Marlon Willson DO  Resident  01/17/23 [id number]

## 2023-01-27 ENCOUNTER — HOSPITAL ENCOUNTER (EMERGENCY)
Age: 33
Discharge: LEFT BEFORE DISCHARGE | End: 2023-01-27

## 2023-01-27 VITALS
RESPIRATION RATE: 16 BRPM | HEIGHT: 67 IN | DIASTOLIC BLOOD PRESSURE: 84 MMHG | BODY MASS INDEX: 28.25 KG/M2 | TEMPERATURE: 98 F | SYSTOLIC BLOOD PRESSURE: 137 MMHG | WEIGHT: 180 LBS | HEART RATE: 98 BPM | OXYGEN SATURATION: 98 %

## 2023-01-28 ENCOUNTER — HOSPITAL ENCOUNTER (EMERGENCY)
Age: 33
Discharge: HOME | DRG: 881 | End: 2023-01-29
Attending: STUDENT IN AN ORGANIZED HEALTH CARE EDUCATION/TRAINING PROGRAM
Payer: MEDICARE

## 2023-01-28 VITALS
HEART RATE: 100 BPM | RESPIRATION RATE: 18 BRPM | BODY MASS INDEX: 28.25 KG/M2 | DIASTOLIC BLOOD PRESSURE: 70 MMHG | WEIGHT: 180 LBS | TEMPERATURE: 97.9 F | SYSTOLIC BLOOD PRESSURE: 154 MMHG | HEIGHT: 67 IN | OXYGEN SATURATION: 97 %

## 2023-01-28 DIAGNOSIS — R21 RASH AND OTHER NONSPECIFIC SKIN ERUPTION: Primary | ICD-10-CM

## 2023-01-28 PROCEDURE — 99283 EMERGENCY DEPT VISIT LOW MDM: CPT

## 2023-01-28 ASSESSMENT — PAIN - FUNCTIONAL ASSESSMENT: PAIN_FUNCTIONAL_ASSESSMENT: NONE - DENIES PAIN

## 2023-01-29 ENCOUNTER — APPOINTMENT (OUTPATIENT)
Dept: GENERAL RADIOLOGY | Age: 33
DRG: 881 | End: 2023-01-29
Payer: MEDICARE

## 2023-01-29 ENCOUNTER — HOSPITAL ENCOUNTER (EMERGENCY)
Age: 33
Discharge: HOME | DRG: 881 | End: 2023-01-29
Attending: EMERGENCY MEDICINE
Payer: MEDICARE

## 2023-01-29 VITALS
DIASTOLIC BLOOD PRESSURE: 73 MMHG | WEIGHT: 185 LBS | RESPIRATION RATE: 18 BRPM | HEART RATE: 92 BPM | BODY MASS INDEX: 29.03 KG/M2 | TEMPERATURE: 98 F | SYSTOLIC BLOOD PRESSURE: 128 MMHG | OXYGEN SATURATION: 99 % | HEIGHT: 67 IN

## 2023-01-29 DIAGNOSIS — R11.2 NAUSEA AND VOMITING, UNSPECIFIED VOMITING TYPE: Primary | ICD-10-CM

## 2023-01-29 LAB
ABSOLUTE EOS #: 0.1 K/UL (ref 0–0.4)
ABSOLUTE LYMPH #: 1.2 K/UL (ref 1–4.8)
ABSOLUTE MONO #: 0.4 K/UL (ref 0.1–1.3)
ALBUMIN SERPL-MCNC: 3.7 G/DL (ref 3.5–5.2)
ALP BLD-CCNC: 57 U/L (ref 40–129)
ALT SERPL-CCNC: 9 U/L (ref 5–41)
ANION GAP SERPL CALCULATED.3IONS-SCNC: 10 MMOL/L (ref 9–17)
AST SERPL-CCNC: 17 U/L
BASOPHILS # BLD: 1 % (ref 0–2)
BASOPHILS ABSOLUTE: 0 K/UL (ref 0–0.2)
BILIRUB SERPL-MCNC: 0.3 MG/DL (ref 0.3–1.2)
BUN BLDV-MCNC: 8 MG/DL (ref 6–20)
CALCIUM SERPL-MCNC: 8.3 MG/DL (ref 8.6–10.4)
CHLORIDE BLD-SCNC: 100 MMOL/L (ref 98–107)
CO2: 25 MMOL/L (ref 20–31)
CREAT SERPL-MCNC: 0.56 MG/DL (ref 0.7–1.2)
EOSINOPHILS RELATIVE PERCENT: 2 % (ref 0–4)
GFR SERPL CREATININE-BSD FRML MDRD: >60 ML/MIN/1.73M2
GLUCOSE BLD-MCNC: 95 MG/DL (ref 70–99)
HCT VFR BLD CALC: 36.3 % (ref 41–53)
HEMOGLOBIN: 11.9 G/DL (ref 13.5–17.5)
LIPASE: 24 U/L (ref 13–60)
LYMPHOCYTES # BLD: 31 % (ref 24–44)
MAGNESIUM: 1.9 MG/DL (ref 1.6–2.6)
MCH RBC QN AUTO: 29.2 PG (ref 26–34)
MCHC RBC AUTO-ENTMCNC: 32.8 G/DL (ref 31–37)
MCV RBC AUTO: 89.1 FL (ref 80–100)
MONOCYTES # BLD: 12 % (ref 1–7)
PDW BLD-RTO: 13.9 % (ref 11.5–14.9)
PLATELET # BLD: 267 K/UL (ref 150–450)
PMV BLD AUTO: 7.7 FL (ref 6–12)
POTASSIUM SERPL-SCNC: 3.9 MMOL/L (ref 3.7–5.3)
RBC # BLD: 4.07 M/UL (ref 4.5–5.9)
SEG NEUTROPHILS: 54 % (ref 36–66)
SEGMENTED NEUTROPHILS ABSOLUTE COUNT: 2.1 K/UL (ref 1.3–9.1)
SODIUM BLD-SCNC: 135 MMOL/L (ref 135–144)
TOTAL PROTEIN: 6.6 G/DL (ref 6.4–8.3)
WBC # BLD: 3.8 K/UL (ref 3.5–11)

## 2023-01-29 PROCEDURE — 83735 ASSAY OF MAGNESIUM: CPT

## 2023-01-29 PROCEDURE — 6370000000 HC RX 637 (ALT 250 FOR IP): Performed by: EMERGENCY MEDICINE

## 2023-01-29 PROCEDURE — 85025 COMPLETE CBC W/AUTO DIFF WBC: CPT

## 2023-01-29 PROCEDURE — 80053 COMPREHEN METABOLIC PANEL: CPT

## 2023-01-29 PROCEDURE — 83690 ASSAY OF LIPASE: CPT

## 2023-01-29 PROCEDURE — 36415 COLL VENOUS BLD VENIPUNCTURE: CPT

## 2023-01-29 PROCEDURE — 83036 HEMOGLOBIN GLYCOSYLATED A1C: CPT

## 2023-01-29 PROCEDURE — 99283 EMERGENCY DEPT VISIT LOW MDM: CPT

## 2023-01-29 PROCEDURE — 6370000000 HC RX 637 (ALT 250 FOR IP): Performed by: STUDENT IN AN ORGANIZED HEALTH CARE EDUCATION/TRAINING PROGRAM

## 2023-01-29 RX ORDER — KETOCONAZOLE 20 MG/G
CREAM TOPICAL
Qty: 30 G | Refills: 1 | Status: ON HOLD | OUTPATIENT
Start: 2023-01-29 | End: 2023-02-01 | Stop reason: HOSPADM

## 2023-01-29 RX ORDER — DIPHENHYDRAMINE HCL 25 MG
25 TABLET ORAL ONCE
Status: COMPLETED | OUTPATIENT
Start: 2023-01-29 | End: 2023-01-29

## 2023-01-29 RX ORDER — ONDANSETRON 4 MG/1
4 TABLET, ORALLY DISINTEGRATING ORAL ONCE
Status: COMPLETED | OUTPATIENT
Start: 2023-01-29 | End: 2023-01-29

## 2023-01-29 RX ORDER — CYCLOBENZAPRINE HCL 5 MG
5 TABLET ORAL ONCE
Status: COMPLETED | OUTPATIENT
Start: 2023-01-29 | End: 2023-01-29

## 2023-01-29 RX ORDER — ACETAMINOPHEN 500 MG
1000 TABLET ORAL ONCE
Status: COMPLETED | OUTPATIENT
Start: 2023-01-29 | End: 2023-01-29

## 2023-01-29 RX ADMIN — ONDANSETRON 4 MG: 4 TABLET, ORALLY DISINTEGRATING ORAL at 03:45

## 2023-01-29 RX ADMIN — DIPHENHYDRAMINE HYDROCHLORIDE 25 MG: 25 TABLET ORAL at 03:45

## 2023-01-29 RX ADMIN — ACETAMINOPHEN 1000 MG: 500 TABLET ORAL at 00:07

## 2023-01-29 RX ADMIN — CYCLOBENZAPRINE HYDROCHLORIDE 5 MG: 5 TABLET, FILM COATED ORAL at 00:07

## 2023-01-29 ASSESSMENT — PAIN DESCRIPTION - LOCATION: LOCATION: FOOT

## 2023-01-29 ASSESSMENT — ENCOUNTER SYMPTOMS
ABDOMINAL PAIN: 1
NAUSEA: 1
VOMITING: 1
COUGH: 0

## 2023-01-29 ASSESSMENT — PAIN SCALES - GENERAL: PAINLEVEL_OUTOF10: 6

## 2023-01-29 ASSESSMENT — PAIN - FUNCTIONAL ASSESSMENT: PAIN_FUNCTIONAL_ASSESSMENT: 0-10

## 2023-01-29 NOTE — ED PROVIDER NOTES
[facility]      Pt Name: Luz Jimenez  MRN: [id number]  [place] 1990  Date of evaluation: 1/29/23    CHIEF COMPLAINT       Chief Complaint   Patient presents with    Rash       HISTORY OF PRESENT ILLNESS   Luz Jimenez is a 28 y.o. male who presents with itchy skin rash to chest.  Patient is well-known to the emergency department. History of multiple psychiatric diseases and homeless. States he is having a itchy rash to the chest which is ongoing for the past day. Denies any changes in detergents or any different foods.     PASTMEDICAL HISTORY     Past Medical History:   Diagnosis Date    Anxiety     Arthritis     Asthma     Bipolar I disorder, most recent episode (or current) depressed, unspecified 9/12/2014    Clostridium difficile infection     COPD (chronic obstructive pulmonary disease) ([address]     Depression     Disease of blood and blood forming organ     Eczema     Fracture, metacarpal     R 4th and 5th    Gastric ulcer     Gastritis 06/13/2018    GERD (gastroesophageal reflux disease)     GI bleed     H. 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[address]     Substance abuse ([address]     Hx of opiates, benzos, cocaine, alcohol abuse    Suicidal ideation     Suicide attempt by hanging ([address]     Tobacco dependence     Ulcerative colitis ([address]     UTI (urinary tract infection)      Past Problem List  Patient Active Problem List   Diagnosis Code    Opioid abuse ([address] F11.10    Noncompliance Z91.199    Rectal bleeding K62.5    Smoker F17.200    Juvenile rheumatoid arthritis ([address] M08.00    SRIRAM (obstructive sleep apnea) G47.33    Primary insomnia F51.01    Calculus of bile duct with acute on chronic cholecystitis K80.46    Mild intermittent asthma without complication Z73.53    Gastritis K29.70    Generalized abdominal pain R10.84    Anemia D64.9    Elevated liver enzymes R74.8    Nausea and vomiting R11.2    Opioid type dependence, continuous (HCC) F11.20    Abdominal pain R10.9    Diarrhea R19.7    Irritable bowel syndrome with both constipation and diarrhea K58.2    Schizoaffective disorder, bipolar type ([address] F25.0    GI bleed K92.2    Depression with suicidal ideation F32. A, R45.851    Schizoaffective disorder ([address] F25.9    MDD (major depressive disorder), recurrent severe, without psychosis ([address] F33.2    Severe episode of recurrent major depressive disorder, without psychotic features ([address] F33.2    Diabetes mellitus type 2 in obese ([address] E11.69, E66.9    Mixed hyperlipidemia E78.2    Cocaine abuse ([address] F14.10    Acute psychosis ([address] F23    PUD (peptic ulcer disease) K27.9    Gastroesophageal reflux disease without esophagitis K21.9    Prediabetes R73.03    Acute respiratory failure with hypoxia ([address] J96.01       SURGICAL HISTORY       Past Surgical History:   Procedure Laterality Date    ABDOMEN SURGERY      upper GI scope 7/7/2015    BRONCHOSCOPY      CHOLECYSTECTOMY, LAPAROSCOPIC  07/14/2017    surgery performed at [facility], COLON, DIAGNOSTIC      OTHER SURGICAL HISTORY      lumbar puncture    NE COLONOSCOPY W/BIOPSY SINGLE/MULTIPLE  8/9/2017    COLONOSCOPY WITH BIOPSY performed by Esdras Kay MD at [facility]oscopy    NE EGD TRANSORAL BIOPSY SINGLE/MULTIPLE N/A 3/20/2017    EGD BIOPSY performed by Domnigo Angulo MD at [facility] Endoscopy    NE ESOPHAGOGASTRODUODENOSCOPY TRANSORAL DIAGNOSTIC N/A 8/9/2017    EGD ESOPHAGOGASTRODUODENOSCOPY performed by Esdras Kay MD at [facility] N/A 3/20/2017    [address] [facility] performed by Alanis JOHNSON Dave Anand MD at [address] [facility] ENDOSCOPY  2/4/16    UPPER GASTROINTESTINAL ENDOSCOPY N/A 6/13/2018    GASTRITIS    UPPER GASTROINTESTINAL ENDOSCOPY N/A 9/20/2018    EGD BIOPSY performed by Alexandria Roldan MD at [facility]       Discharge Medication List as of 1/29/2023 12:17 AM        CONTINUE these medications which have NOT CHANGED    Details   permethrin (ELIMITE) 5 % cream Apply from neck down. Please leave on for 8 to 12 hours before washing it off., Disp-60 g, R-0, Print      triamcinolone (KENALOG) 0.1 % cream Apply topically 2 times daily for 1 week., Disp-80 g, R-0, Normal      acetaminophen (TYLENOL) 325 MG tablet Take 1 tablet by mouth every 6 hours as needed for Pain, Disp-5 tablet, R-0Normal      ALPRAZolam (XANAX) 0.5 MG tablet Historical Med      QUEtiapine (SEROQUEL) 100 MG tablet Historical Med      tiZANidine (ZANAFLEX) 4 MG tablet Take 1 tablet by mouth every 6 hours as needed (muscle pain), Disp-8 tablet, R-0Normal      hydrocortisone 1 % cream Apply topically 2 -3 times daily for 5 - 7 days. , Disp-30 g, R-0, Normal      cloNIDine (CATAPRES) 0.1 MG tablet Take 1 tablet by mouth nightly for 3 days, Disp-3 tablet, R-0Print      Zinc Oxide 6 % CREA Apply 1 applicator topically in the morning and at bedtime, Disp-114 g, R-0Print      ARIPiprazole (ABILIFY) 15 MG tablet Take 1 tablet by mouth daily, Disp-30 tablet, R-3Normal      cetirizine (ZYRTEC) 10 MG tablet Take 1 tablet by mouth daily, Disp-30 tablet, R-0Normal             ALLERGIES     is allergic to dicyclomine, famotidine, geodon [ziprasidone hcl], haloperidol, iv dye [iodides], reglan [metoclopramide], ibuprofen, aspirin, dicyclomine hcl, hydroxyzine hcl, iodine, metronidazole, mirtazapine, promethazine, and ziprasidone. FAMILY HISTORY     He indicated that his mother is alive. He indicated that his father is alive. He indicated that the status of his sister is unknown.  He indicated that the status of his other is unknown. He indicated that the status of his paternal cousin is unknown. SOCIAL HISTORY       Social History     Tobacco Use    Smoking status: Every Day     Packs/day: 0.50     Years: 15.00     Pack years: 7.50     Types: Cigarettes    Smokeless tobacco: Never   Vaping Use    Vaping Use: Former   Substance Use Topics    Alcohol use: Not Currently     Comment: drinks daily    Drug use: Not Currently     Types: Opiates , Cocaine     Comment: Hx of opiates, benzos, cocaine, alcohol abuse       PHYSICAL EXAM     INITIAL VITALS: BP (!) 154/70   Pulse 100   Temp 97.9 °F (36.6 °C) (Oral)   Resp 18   Ht 5' 7\" (1.702 m)   Wt 180 lb (81.6 kg)   SpO2 97%   BMI 28.19 kg/m²    Physical Exam  Vitals and nursing note reviewed. Constitutional:       General: He is not in acute distress. Appearance: He is well-developed. He is not toxic-appearing. HENT:      Head: Normocephalic and atraumatic. Nose: Nose normal.      Mouth/Throat:      Mouth: Mucous membranes are moist.   Eyes:      General: No scleral icterus. Conjunctiva/sclera: Conjunctivae normal.      Pupils: Pupils are equal, round, and reactive to light. Cardiovascular:      Rate and Rhythm: Normal rate and regular rhythm. Heart sounds: Normal heart sounds. No murmur heard. No friction rub. No gallop. Pulmonary:      Effort: Pulmonary effort is normal. No respiratory distress. Breath sounds: Normal breath sounds. No wheezing or rales. Musculoskeletal:         General: Normal range of motion. Skin:     General: Skin is warm and dry. Findings: No erythema or rash. Comments: Area of raised patches to anterior chest, no erythema, skin coloration changes   Neurological:      Mental Status: He is alert and oriented to person, place, and time.    Psychiatric:         Behavior: Behavior normal.       MEDICAL DECISION MAKING / ED COURSE:   Summary of Patient Presentation:      1)  Number and Complexity of Problems  Problem List This Visit:    1. Rash and other nonspecific skin eruption        Differential Diagnosis: Cellulitis versus contact dermatitis versus tinea    Diagnoses Considered but Do Not Suspect: Del Valle-Eusebio    Pertinent Comorbid Conditions:    Past Medical History:   Diagnosis Date    Anxiety     Arthritis     Asthma     Bipolar I disorder, most recent episode (or current) depressed, unspecified 9/12/2014    Clostridium difficile infection     COPD (chronic obstructive pulmonary disease) [facility]     Depression     Disease of blood and blood forming organ     Eczema     Fracture, metacarpal     R 4th and 5th    Gastric ulcer     Gastritis 06/13/2018    GERD (gastroesophageal reflux disease)     GI bleed     H. 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HCC)     PUD (peptic ulcer disease)     Rheumatoid arthritis ([address]     Rheumatoid arthritis(714.0)     Severe recurrent major depression without psychotic features ([facility] 11/21/2021    Sleep apnea     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[facility]     Substance abuse ([facility]     Hx of opiates, benzos, cocaine, alcohol abuse    Suicidal ideation     Suicide attempt by hanging ([address]     Tobacco dependence     Ulcerative colitis ([facility]     UTI (urinary tract infection)        2)  Data Reviewed    External Documents Reviewed: Previous ER visits reviewed by myself    3)  Treatment and Disposition  [Patient repeat assessment, Disposition discussion with patient/family, Case discussed with consulting clinician, MIPS, Social determinants of health impacting treatment or disposition, Shared Decision Making, Code Status Discussion:]    Suspected tinea fungal infection to the chest.  Ketoconazole. Also complaining of muscle cramps and lower extremity swelling. Minimal swelling visualized. Will give a small dose of Flexeril, similar and easier.   Based on the low acuity of concerning symptoms and improvement of symptoms, patient will be discharged with follow up and prescription information listed in the Disposition section. Patient states they will follow-up with primary care physician and/or return to the emergency department should they experience a change or worsening of symptoms. Patient will be discharged with resources: summary of visit, instructions, follow-up information, prescriptions if necessary. Patient/ family instructed to read discharge paperwork. All of their questions and concerns were addressed. Suspicion for any acute life-threatening processes is low. Patient voices understanding of plan.  0    DATA FOR LAB AND RADIOLOGY TESTS ORDERED BELOW ARE REVIEWED BY THE ED CLINICIAN:    RADIOLOGY: All x-rays, CT, MRI, and formal ultrasound images (except ED bedside ultrasound) are read by the radiologist, see reports below, unless otherwise noted in MDM or here. Reports below are reviewed by myself. No orders to display       LABS: Lab orders shown below, the results are reviewed by myself, and all abnormals are listed below.   Labs Reviewed - No data to display    Vitals Reviewed:    Vitals:    01/28/23 2309   BP: (!) 154/70   Pulse: 100   Resp: 18   Temp: 97.9 °F (36.6 °C)   TempSrc: Oral   SpO2: 97%   Weight: 180 lb (81.6 kg)   Height: 5' 7\" (1.702 m)     MEDICATIONS GIVEN TO PATIENT THIS ENCOUNTER:  Orders Placed This Encounter   Medications    cyclobenzaprine (FLEXERIL) tablet 5 mg    acetaminophen (TYLENOL) tablet 1,000 mg    ketoconazole (NIZORAL) 2 % cream     Sig: Apply topically daily to chest for 14 days     Dispense:  30 g     Refill:  1     DISCHARGE PRESCRIPTIONS:  Discharge Medication List as of 1/29/2023 12:17 AM        START taking these medications    Details   ketoconazole (NIZORAL) 2 % cream Apply topically daily to chest for 14 days, Disp-30 g, R-1, Print           PHYSICIAN CONSULTS ORDERED THIS ENCOUNTER:  None    ED Course Notes From Epic Narrator:         CRITICAL CARE:   0    PROCEDURES:  none    FINAL IMPRESSION      1.  Rash and other nonspecific skin eruption          DISPOSITION/PLAN   DISPOSITION Decision To Discharge 01/29/2023 12:12:49 AM      OUTPATIENT FOLLOW UP THE PATIENT:  Ritu Golden MD  [address] [place] [address]  [phone]    Schedule an appointment as soon as possible for a visit in 1 week  As needed      DISCHARGE MEDICATIONS:  Discharge Medication List as of 1/29/2023 12:17 AM        START taking these medications    Details   ketoconazole (NIZORAL) 2 % cream Apply topically daily to chest for 14 days, Disp-30 g, R-1, Print             Ryan Wells DO  EmergencyMedicine Attending    (Please note that portions of this note were completed with a voice recognition program.  Efforts were made to edit the dictations but occasionally words are mis-transcribed.)     Ryan Wells DO  01/29/23 [id number]

## 2023-01-29 NOTE — DISCHARGE INSTRUCTIONS
For pain use acetaminophen (Tylenol) or ibuprofen (Motrin / Advil), unless prescribed medications that have acetaminophen or ibuprofen (or similar medications) in it. You can take over the counter acetaminophen tablets (1 - 2 tablets of the 500-mg strength every 6 hours) or ibuprofen tablets (2 tablets every 4 hours). PLEASE RETURN TO THE EMERGENCY DEPARTMENT IMMEDIATELY for worsening symptoms, white drainage from the wound, redness or streaking, or if you develop any concerning symptoms such as: high fever not relieved by acetaminophen (Tylenol) and/or ibuprofen (Motrin / Advil), chills, shortness of breath, chest pain, feeling of your heart fluttering or racing, persistent nausea and/or vomiting, vomiting up blood, blood in your stool, loss of consciousness, numbness, weakness or tingling in the arms or legs or change in color of the extremities, changes in mental status, persistent headache, blurry vision, loss of bladder / bowel control, unable to follow up with your physician, or other any other care or concern.

## 2023-01-29 NOTE — ED PROVIDER NOTES
[address] [facility]  EMERGENCY DEPARTMENT ENCOUNTER      Pt Name: Sarai Boyer  MRN: [id number]  [place] 1990  Date of evaluation: 1/29/23      CHIEF COMPLAINT       Chief Complaint   Patient presents with    Abdominal Pain    Foot Problem    Diarrhea     HISTORY OF PRESENT ILLNESS   HPI 28 y.o. male presents with c/o abdominal pain. Patient reports that he has been having episodes of vomiting over the last several weeks. He says that he throws up about twice a day and this is associated with an epigastric abdominal pain. This is been a chronic problem for the patient. Reviewing the medical record, patient has frequent emergency department presentations for similar complaints. He had a CT scan of his abdomen in May 2022 that showed no acute abnormalities. Patient has multiple chronic medical conditions, including a history of polysubstance abuse, but he denies any recent drug use. No reviewing his drug screen from a week and a half ago it was positive for cocaine and methadone. When the patient originally signed that he wrote that he was having chest pain, but then when he got back to the room, he is denying any chest pain. REVIEW OF SYSTEMS       Review of Systems   Constitutional:  Negative for fever. Respiratory:  Negative for cough. Cardiovascular:  Positive for leg swelling. Negative for chest pain. Gastrointestinal:  Positive for abdominal pain, nausea and vomiting. Genitourinary:  Negative for difficulty urinating. Musculoskeletal:  Positive for arthralgias. Neurological:  Negative for headaches.      PAST MEDICAL HISTORY     Past Medical History:   Diagnosis Date    Anxiety     Arthritis     Asthma     Bipolar I disorder, most recent episode (or current) depressed, unspecified 9/12/2014    Clostridium difficile infection     COPD (chronic obstructive pulmonary disease) [facility]     Depression     Disease of blood and blood forming organ     Eczema     Fracture, metacarpal     R 4th and 5th    Gastric ulcer     Gastritis 06/13/2018    GERD (gastroesophageal reflux disease)     GI bleed     H. 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[facility]     Substance abuse ([facility]     Hx of opiates, benzos, cocaine, alcohol abuse    Suicidal ideation     Suicide attempt by hanging ([facility]     Tobacco dependence     Ulcerative colitis ([facility]     UTI (urinary tract infection)        SURGICAL HISTORY       Past Surgical History:   Procedure Laterality Date    ABDOMEN SURGERY      upper GI scope 7/7/2015    BRONCHOSCOPY      CHOLECYSTECTOMY, LAPAROSCOPIC  07/14/2017    surgery performed at [address] [place] [address], COLON, DIAGNOSTIC      OTHER SURGICAL HISTORY      lumbar puncture    DE COLONOSCOPY W/BIOPSY SINGLE/MULTIPLE  8/9/2017    COLONOSCOPY WITH BIOPSY performed by Yasmin Marrero MD at [facility] Endoscopy    DE EGD TRANSORAL BIOPSY SINGLE/MULTIPLE N/A 3/20/2017    EGD BIOPSY performed by Codi Salmon MD at [facility] Endoscopy    DE ESOPHAGOGASTRODUODENOSCOPY TRANSORAL DIAGNOSTIC N/A 8/9/2017    EGD ESOPHAGOGASTRODUODENOSCOPY performed by Yasmin Marrero MD at [address] [place] [address] N/A 3/20/2017    SIGMOIDOSCOPY DIAGNOSTIC FLEXIBLE performed by Codi Salmon MD at [place] [address]  2/4/16    UPPER GASTROINTESTINAL ENDOSCOPY N/A 6/13/2018    GASTRITIS    UPPER GASTROINTESTINAL ENDOSCOPY N/A 9/20/2018    EGD BIOPSY performed by Luciano Romo MD at [facility]       Discharge Medication List as of 1/29/2023  4:20 AM        CONTINUE these medications which have NOT CHANGED    Details   ketoconazole (NIZORAL) 2 % cream Apply topically daily to chest for 14 days, Disp-30 g, R-1, Print      permethrin (ELIMITE) 5 % cream Apply from neck down. Please leave on for 8 to 12 hours before washing it off., Disp-60 g, R-0, Print      triamcinolone (KENALOG) 0.1 % cream Apply topically 2 times daily for 1 week., Disp-80 g, R-0, Normal      acetaminophen (TYLENOL) 325 MG tablet Take 1 tablet by mouth every 6 hours as needed for Pain, Disp-5 tablet, R-0Normal      ALPRAZolam (XANAX) 0.5 MG tablet Historical Med      QUEtiapine (SEROQUEL) 100 MG tablet Historical Med      tiZANidine (ZANAFLEX) 4 MG tablet Take 1 tablet by mouth every 6 hours as needed (muscle pain), Disp-8 tablet, R-0Normal      hydrocortisone 1 % cream Apply topically 2 -3 times daily for 5 - 7 days. , Disp-30 g, R-0, Normal      cloNIDine (CATAPRES) 0.1 MG tablet Take 1 tablet by mouth nightly for 3 days, Disp-3 tablet, R-0Print      Zinc Oxide 6 % CREA Apply 1 applicator topically in the morning and at bedtime, Disp-114 g, R-0Print      ARIPiprazole (ABILIFY) 15 MG tablet Take 1 tablet by mouth daily, Disp-30 tablet, R-3Normal      cetirizine (ZYRTEC) 10 MG tablet Take 1 tablet by mouth daily, Disp-30 tablet, R-0Normal             ALLERGIES     is allergic to dicyclomine, famotidine, geodon [ziprasidone hcl], haloperidol, iv dye [iodides], reglan [metoclopramide], ibuprofen, aspirin, dicyclomine hcl, hydroxyzine hcl, iodine, metronidazole, mirtazapine, promethazine, and ziprasidone. FAMILY HISTORY     He indicated that his mother is alive. He indicated that his father is alive. He indicated that the status of his sister is unknown. He indicated that the status of his other is unknown. He indicated that the status of his paternal cousin is unknown. SOCIAL HISTORY      reports that he has been smoking cigarettes. He has a 7.50 pack-year smoking history.  He has never used smokeless tobacco. He reports that he does not currently use alcohol. He reports that he does not currently use drugs after having used the following drugs: Opiates  and Cocaine. PHYSICAL EXAM     INITIAL VITALS: /73   Pulse 92   Temp 98 °F (36.7 °C) (Oral)   Resp 18   Ht 5' 7\" (1.702 m)   Wt 185 lb (83.9 kg)   SpO2 99%   BMI 28.98 kg/m²   General: NAD  Head: NCAT  Neck: No JVD  Cardiovascular: RRR  Pulmonary: CTA  Abdomen: Flat, nondistended, no focal tenderness, no guarding or rebound. Neuro: Alert fluent speech ambulatory with normal gait  Extremities: Bilateral edema DP and PT pulses are appropriate, no wounds or ulcerative lesions to the feet. MEDICAL DECISION MAKING:     This is a 26-year-old gentleman presenting with complaints of nausea vomiting and leg edema. We will check his liver function tests his renal function, will make sure he does not have pancreatitis. We will provide him some symptomatic treatment. Patient's abdomen is soft and nontender, I doubt any colitis obstruction or bowel perforation. I do not think that he needs any CT scan imaging at this time. We will start out with x-rays of his abdomen. When the patient is signed in complaining of chest pain, we ordered an EKG and troponins. When the nurse went in to do the EKG, the patient was adamant that he does not have any chest pain and did not want to have an EKG performed. Emergency Department course:  Laboratory studies unremarkable, patient reassessed and is resting comfortably. Stable for discharge and outpatient follow-up. DATA FOR LAB AND RADIOLOGY TESTS ORDERED BELOW ARE REVIEWED BY THE ED CLINICIAN:    RADIOLOGY: All x-rays, CT, MRI, and formal ultrasound images (except ED bedside ultrasound) are read by the radiologist, see reports below, unless otherwise noted in MDM or here. Reports below are reviewed by myself.   No orders to display       LABS: Lab orders shown below, the results are reviewed by myself, and all abnormals are listed below. Labs Reviewed   CBC WITH AUTO DIFFERENTIAL - Abnormal; Notable for the following components:       Result Value    RBC 4.07 (*)     Hemoglobin 11.9 (*)     Hematocrit 36.3 (*)     Monocytes 12 (*)     All other components within normal limits   COMPREHENSIVE METABOLIC PANEL - Abnormal; Notable for the following components:    Creatinine 0.56 (*)     Calcium 8.3 (*)     All other components within normal limits   MAGNESIUM   LIPASE   URINALYSIS WITH REFLEX TO CULTURE       Vitals Reviewed:    Vitals:    01/29/23 0233   BP: 128/73   Pulse: 92   Resp: 18   Temp: 98 °F (36.7 °C)   TempSrc: Oral   SpO2: 99%   Weight: 185 lb (83.9 kg)   Height: 5' 7\" (1.702 m)     MEDICATIONS GIVEN TO PATIENT THIS ENCOUNTER:  Orders Placed This Encounter   Medications    diphenhydrAMINE (BENADRYL) tablet 25 mg    ondansetron (ZOFRAN-ODT) disintegrating tablet 4 mg     DISCHARGE PRESCRIPTIONS:  Discharge Medication List as of 1/29/2023  4:20 AM        PHYSICIAN CONSULTS ORDERED THIS ENCOUNTER:  None     FINAL IMPRESSION      1.  Nausea and vomiting, unspecified vomiting type          DISPOSITION/PLAN   DISPOSITION Decision To Discharge 01/29/2023 03:59:41 AM      PATIENT REFERRED TO:  Brandon Cruz MD  [address]  [phone]    In 2 days      [facility] ED  [address]  [phone]    If symptoms worsen    DISCHARGE MEDICATIONS:  Discharge Medication List as of 1/29/2023  4:20 AM            Theresa Sherman MD  Attending Emergency Physician                        Theresa Sherman MD  01/29/23 2194

## 2023-01-29 NOTE — ED TRIAGE NOTES
Here tonight with an itchy rash to chest, feet, and ankles. Denies new laundry detergent, hygiene products, or new foods. States \"I just have joint pain and foot pain. \"

## 2023-01-30 ENCOUNTER — HOSPITAL ENCOUNTER (INPATIENT)
Age: 33
LOS: 3 days | Discharge: HOME | DRG: 881 | End: 2023-02-02
Attending: PSYCHIATRY & NEUROLOGY | Admitting: PSYCHIATRY & NEUROLOGY
Payer: MEDICARE

## 2023-01-30 PROCEDURE — 6370000000 HC RX 637 (ALT 250 FOR IP): Performed by: PSYCHIATRY & NEUROLOGY

## 2023-01-30 PROCEDURE — 99223 1ST HOSP IP/OBS HIGH 75: CPT | Performed by: INTERNAL MEDICINE

## 2023-01-30 PROCEDURE — 2040000000 HC PSYCH ICU R&B

## 2023-01-30 PROCEDURE — 6360000002 HC RX W HCPCS: Performed by: NURSE PRACTITIONER

## 2023-01-30 PROCEDURE — 1240000000 HC EMOTIONAL WELLNESS R&B

## 2023-01-30 PROCEDURE — 90792 PSYCH DIAG EVAL W/MED SRVCS: CPT | Performed by: PSYCHIATRY & NEUROLOGY

## 2023-01-30 RX ORDER — POLYETHYLENE GLYCOL 3350 17 G/17G
17 POWDER, FOR SOLUTION ORAL DAILY PRN
Status: DISCONTINUED | OUTPATIENT
Start: 2023-01-30 | End: 2023-02-02 | Stop reason: HOSPADM

## 2023-01-30 RX ORDER — MAGNESIUM HYDROXIDE/ALUMINUM HYDROXICE/SIMETHICONE 120; 1200; 1200 MG/30ML; MG/30ML; MG/30ML
30 SUSPENSION ORAL EVERY 6 HOURS PRN
Status: DISCONTINUED | OUTPATIENT
Start: 2023-01-30 | End: 2023-02-02 | Stop reason: HOSPADM

## 2023-01-30 RX ORDER — DIPHENHYDRAMINE HCL 25 MG
50 TABLET ORAL EVERY 6 HOURS PRN
Status: DISCONTINUED | OUTPATIENT
Start: 2023-01-30 | End: 2023-01-30

## 2023-01-30 RX ORDER — CLONIDINE HYDROCHLORIDE 0.1 MG/1
0.1 TABLET ORAL EVERY 4 HOURS PRN
Status: DISCONTINUED | OUTPATIENT
Start: 2023-01-30 | End: 2023-02-02 | Stop reason: HOSPADM

## 2023-01-30 RX ORDER — CHLORPROMAZINE HYDROCHLORIDE 25 MG/ML
50 INJECTION INTRAMUSCULAR 3 TIMES DAILY PRN
Status: DISCONTINUED | OUTPATIENT
Start: 2023-01-30 | End: 2023-02-02 | Stop reason: HOSPADM

## 2023-01-30 RX ORDER — CYCLOBENZAPRINE HCL 10 MG
10 TABLET ORAL 3 TIMES DAILY PRN
Status: DISCONTINUED | OUTPATIENT
Start: 2023-01-30 | End: 2023-02-02 | Stop reason: HOSPADM

## 2023-01-30 RX ORDER — CYCLOBENZAPRINE HCL 10 MG
10 TABLET ORAL 3 TIMES DAILY PRN
COMMUNITY

## 2023-01-30 RX ORDER — CHLORPROMAZINE HYDROCHLORIDE 25 MG/1
50 TABLET, FILM COATED ORAL 3 TIMES DAILY PRN
Status: DISCONTINUED | OUTPATIENT
Start: 2023-01-30 | End: 2023-02-02 | Stop reason: HOSPADM

## 2023-01-30 RX ORDER — GABAPENTIN 300 MG/1
1 CAPSULE ORAL 3 TIMES DAILY
Status: ON HOLD | COMMUNITY
Start: 2023-01-19 | End: 2023-02-01 | Stop reason: HOSPADM

## 2023-01-30 RX ORDER — BUPRENORPHINE HYDROCHLORIDE, NALOXONE HYDROCHLORIDE 8; 2 MG/1; MG/1
2 FILM, SOLUBLE BUCCAL; SUBLINGUAL DAILY
Status: ON HOLD | COMMUNITY
Start: 2023-01-24 | End: 2023-02-01 | Stop reason: HOSPADM

## 2023-01-30 RX ORDER — TRAZODONE HYDROCHLORIDE 50 MG/1
50 TABLET ORAL NIGHTLY PRN
COMMUNITY

## 2023-01-30 RX ORDER — DIPHENHYDRAMINE HYDROCHLORIDE 50 MG/ML
50 INJECTION INTRAMUSCULAR; INTRAVENOUS EVERY 6 HOURS PRN
Status: DISCONTINUED | OUTPATIENT
Start: 2023-01-30 | End: 2023-01-30

## 2023-01-30 RX ORDER — DIPHENHYDRAMINE HYDROCHLORIDE 50 MG/ML
50 INJECTION INTRAMUSCULAR; INTRAVENOUS EVERY 6 HOURS PRN
Status: DISCONTINUED | OUTPATIENT
Start: 2023-01-30 | End: 2023-02-02 | Stop reason: HOSPADM

## 2023-01-30 RX ORDER — TRAZODONE HYDROCHLORIDE 50 MG/1
50 TABLET ORAL NIGHTLY PRN
Status: DISCONTINUED | OUTPATIENT
Start: 2023-01-30 | End: 2023-02-02 | Stop reason: HOSPADM

## 2023-01-30 RX ORDER — SERTRALINE HYDROCHLORIDE 25 MG/1
25 TABLET, FILM COATED ORAL DAILY
Status: DISCONTINUED | OUTPATIENT
Start: 2023-01-30 | End: 2023-02-02 | Stop reason: HOSPADM

## 2023-01-30 RX ORDER — DIPHENHYDRAMINE HCL 25 MG
50 TABLET ORAL EVERY 6 HOURS PRN
Status: DISCONTINUED | OUTPATIENT
Start: 2023-01-30 | End: 2023-02-02 | Stop reason: HOSPADM

## 2023-01-30 RX ORDER — ACETAMINOPHEN 325 MG/1
650 TABLET ORAL EVERY 4 HOURS PRN
Status: DISCONTINUED | OUTPATIENT
Start: 2023-01-30 | End: 2023-02-02 | Stop reason: HOSPADM

## 2023-01-30 RX ADMIN — NICOTINE POLACRILEX 2 MG: 2 GUM, CHEWING BUCCAL at 15:13

## 2023-01-30 RX ADMIN — TRAZODONE HYDROCHLORIDE 50 MG: 50 TABLET ORAL at 20:05

## 2023-01-30 RX ADMIN — NICOTINE POLACRILEX 2 MG: 2 GUM, CHEWING BUCCAL at 13:27

## 2023-01-30 RX ADMIN — CYCLOBENZAPRINE 10 MG: 10 TABLET, FILM COATED ORAL at 20:05

## 2023-01-30 RX ADMIN — SERTRALINE HYDROCHLORIDE 25 MG: 25 TABLET ORAL at 17:58

## 2023-01-30 RX ADMIN — NICOTINE POLACRILEX 2 MG: 2 GUM, CHEWING BUCCAL at 17:39

## 2023-01-30 RX ADMIN — DIPHENHYDRAMINE HYDROCHLORIDE 50 MG: 50 INJECTION, SOLUTION INTRAMUSCULAR; INTRAVENOUS at 17:57

## 2023-01-30 RX ADMIN — NICOTINE POLACRILEX 2 MG: 2 GUM, CHEWING BUCCAL at 11:28

## 2023-01-30 RX ADMIN — NICOTINE POLACRILEX 2 MG: 2 GUM, CHEWING BUCCAL at 19:26

## 2023-01-30 ASSESSMENT — LIFESTYLE VARIABLES
HOW MANY STANDARD DRINKS CONTAINING ALCOHOL DO YOU HAVE ON A TYPICAL DAY: PATIENT DOES NOT DRINK
HOW OFTEN DO YOU HAVE A DRINK CONTAINING ALCOHOL: NEVER
HOW OFTEN DO YOU HAVE A DRINK CONTAINING ALCOHOL: PATIENT DECLINED
HOW MANY STANDARD DRINKS CONTAINING ALCOHOL DO YOU HAVE ON A TYPICAL DAY: PATIENT DECLINED

## 2023-01-30 ASSESSMENT — SLEEP AND FATIGUE QUESTIONNAIRES
DO YOU HAVE DIFFICULTY SLEEPING: YES
DO YOU USE A SLEEP AID: YES
SLEEP PATTERN: DIFFICULTY FALLING ASLEEP;DISTURBED/INTERRUPTED SLEEP
AVERAGE NUMBER OF SLEEP HOURS: 6

## 2023-01-30 ASSESSMENT — PAIN SCALES - GENERAL: PAINLEVEL_OUTOF10: 1

## 2023-01-30 NOTE — H&P
Department of Psychiatry  Attending Physician Psychiatric Assessment     Reason for Admission to Psychiatric Unit:  Suicide attempt  Concerns about patient's safety in the community    CHIEF COMPLAINT: Suicide attempt    History obtained from: Patient, electronic medical record          HISTORY OF PRESENT ILLNESS:    Patient is a 61-year-old male with known history of polysubstance abuse-cocaine and opioids presented from [facility] on a emergency medical significant after he ingested multiple pills of muscle relaxer with an intent to kill self. Patient had recent presentation to our emergency department yesterday following which she was discharged and patient made no comments about depression or suicidal thoughts at that time. Reports that he went to [facility] after he ingested a few pills with an intent to kill self. Denies any recent drug use however his UDS is positive for cocaine. Currently reports withdrawal symptoms as severe tremors and restlessness. Patient does report that he has been feeling increasingly depressed for last several weeks now. Endorses anhedonia. Reports poor appetite. Reports is currently homeless and identifies this as a primary stressor. He denies any recent manic or hypomanic symptoms. Denies any auditory or visual hallucinations today. Could not elicit any other psychotic symptoms today. Please note that patient had some psychotic symptoms in the past, possibly substance-induced. History of head trauma: [x] Yes [] No    History of seizures: [] Yes [x] No    History of violence or aggression: [x] Yes [] No         PSYCHIATRIC HISTORY:  [x] Yes [] No    Currently follows with [facility], unclear if currently following up  Endorses lifetime suicide attempts  Multiple psychiatric hospital admissions.   Most recently [facility] in June of 2022    Home Medication Compliance: [] Yes [x] No    Past psychiatric medications includes: Clonidine, Remeron, Geodon, Cymbalta, Abilify, Aristada, Neurontin, trazodone, Seroquel, Suboxone, methadone, Wellbutrin, Zyprexa, Haldol    Adverse reactions from psychotropic medications: [x] Yes [] No  Reports \"skin breakout\" after taking Cymbalta. Endorses reaction to Haldol including throat swelling and thick tongue. Anaphylaxis with Geodon. Remeron caused increased agitation.          Lifetime Psychiatric Review of Systems         Depression: Denies      Anxiety: Denies     Panic Attacks: Denies     Elizabeth or Hypomania: Denies, history of per chart     Phobias: Denies     Obsessions and Compulsions: Denies     Body or Vocal Tics: Denies     Visual Hallucinations: Denies     Auditory Hallucinations: Endorses     Delusions/Paranoia: Endorses     PTSD: Endorses    Past Medical History:        Diagnosis Date    Anxiety     Arthritis     Asthma     Bipolar I disorder, most recent episode (or current) depressed, unspecified 9/12/2014    Clostridium difficile infection     COPD (chronic obstructive pulmonary disease) ([address]     Depression     Disease of blood and blood forming organ     Eczema     Fracture, metacarpal     R 4th and 5th    Gastric ulcer     Gastritis 06/13/2018    GERD (gastroesophageal reflux disease)     GI bleed     H. 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[address]     Substance abuse ([address]     Hx of opiates, benzos, cocaine, alcohol abuse    Suicidal ideation     Suicide attempt by hanging ([address]     Tobacco dependence     Ulcerative colitis ([address]     UTI (urinary tract infection)        Past Surgical History:        Procedure Laterality Date    ABDOMEN SURGERY      upper GI scope 7/7/2015    BRONCHOSCOPY CHOLECYSTECTOMY, LAPAROSCOPIC  07/14/2017    surgery performed at [facility], COLON, DIAGNOSTIC      OTHER SURGICAL HISTORY      lumbar puncture    SC COLONOSCOPY W/BIOPSY SINGLE/MULTIPLE  8/9/2017    COLONOSCOPY WITH BIOPSY performed by Shanell Melo MD at [facility] EGD TRANSORAL BIOPSY SINGLE/MULTIPLE N/A 3/20/2017    EGD BIOPSY performed by Ivana Blevins MD at [facility]    SC ESOPHAGOGASTRODUODENOSCOPY TRANSORAL DIAGNOSTIC N/A 8/9/2017    EGD ESOPHAGOGASTRODUODENOSCOPY performed by Shanell Melo MD at [facility] N/A 3/20/2017    SIGMOIDOSCOPY DIAGNOSTIC FLEXIBLE [facility]  [facility]    UPPER GASTROINTESTINAL ENDOSCOPY N/A 6/13/2018    GASTRITIS    UPPER GASTROINTESTINAL ENDOSCOPY N/A 9/20/2018    EGD BIOPSY performed by Antoine Fischer MD at [facility] OR       Allergies:  Dicyclomine, Famotidine, Geodon [ziprasidone hcl], Haloperidol, Iv dye [iodides], Reglan [metoclopramide], Ibuprofen, Aspirin, Dicyclomine hcl, Hydroxyzine hcl, Iodine, Metronidazole, Mirtazapine, Ondansetron hcl, Promethazine, and Ziprasidone         Social History:     Born in: [place]  Family: Reports that he was raised by both his mother and his father. He reports that he has 4 brothers and 1 sister whom he is close with. Highest Level of Education: Received his GED  Occupation: Currently unemployed receives SSDI  Marital Status: Never   Children: Patient previously reported that he has 2 daughters who he was not able to see. Patient currently states he thought he had 2 daughters but only one of them is his.   Residence:  Homeless  Stressors:mental illness and history of polysubstance abuse  Patient Assets/Supportive Factors: Desire to receive mental health treatment         DRUG USE HISTORY  Social History     Tobacco Use   Smoking Status Every Day Packs/day: 0.50    Years: 15.00    Pack years: 7.50    Types: Cigarettes   Smokeless Tobacco Never     Social History     Substance and Sexual Activity   Alcohol Use Not Currently    Comment: drinks daily     Social History     Substance and Sexual Activity   Drug Use Yes    Types: Cocaine    Comment: Hx of opiates, benzos, cocaine, alcohol abuse       When discussing alcohol consumption patient denies current use however endorses history of. When discussing illicit drug use patient was unable to clearly communicate. Patient reports he does not believe he has been doing drugs. Patient has a history of cocaine, marijuana and fentanyl use. UDS pending upon admission. LEGAL HISTORY:   HISTORY OF INCARCERATION: [x] Yes [] No    Family History:       Problem Relation Age of Onset    Diabetes Father     Alcohol Abuse Father     Depression Father     Arthritis Father     High Blood Pressure Father     Other Father         aneurysm & epilepsy    Migraines Father     Arthritis Mother     Other Mother         aneurysm & epilepsy    Migraines Mother     Diabetes Brother         Aunt and uncles    Depression Brother     Mental Illness Brother     Other Brother         epilepsy    Migraines Brother     Stroke Other         Uncle    Other Brother         murdered Oct 6th, 2014    Colon Cancer Paternal Cousin 43    Other Sister         epilepsy    Migraines Sister        Psychiatric Family History  Patient endorses psychiatric family history. Suicides in family: [] Yes [x] No    Substance use in family: [] Yes [x] No         PHYSICAL EXAM:  Vitals:  Ht 5' 7\" (1.702 m)   Wt 185 lb (83.9 kg)   BMI 28.98 kg/m²   Pain Level: Denies    LABS:  Labs reviewed: [x] Yes      Last EKG in EMR reviewed: [x] Yes  QTc 430 on 5/24/2022          Review of Systems   Constitutional: Negative for chills and weight loss. HENT: Negative for ear pain and nosebleeds. Eyes: Negative for blurred vision and photophobia. Respiratory: Negative for cough, shortness of breath and wheezing. Cardiovascular: Negative for chest pain and palpitations. Gastrointestinal: Negative for abdominal pain, diarrhea and vomiting. Genitourinary: Negative for dysuria and urgency. Musculoskeletal: Negative for falls and joint pain. Skin: Negative for itching and rash. Neurological: Negative for tremors, seizures and weakness. Endo/Heme/Allergies: Does not bruise/bleed easily. Physical Exam:   Constitutional:  Appears well-developed and well-nourished, no acute distress. HENT:   Head: Normocephalic and atraumatic. Eyes: Conjunctivae are normal. Right eye exhibits no discharge. Left eye exhibits no discharge. No scleral icterus. Neck: Normal range of motion. Neck supple. Pulmonary/Chest:  No respiratory distress or accessory muscle use, no wheezing. Cardiac: Regular rate and rhythm. Abdominal: Soft. Non-tender. Exhibits no distension. Musculoskeletal: Normal range of motion. Exhibits no edema. Neurological: cranial nerves II-XII grossly in tact, normal gait and station. Skin: Skin is warm and dry. Patient is not diaphoretic. No erythema. Mental Status Examination:    Level of consciousness: Awake and alert  Appearance:   Requiring redirection to maintain proper clothing attire on unit, seated in chair, Poor grooming   Behavior/Motor: Approachable, bizarre movements, psychomotor agitation noted  Attitude toward examiner:   [address] cooperation, poor attention, fair eye contact  Speech: Slow rate, normal volume, and tone. Mood: \"Okay\"  Affect:  Inappropriate  Thought processes: illogical and incoherent. Thought content: passive suicidal ideations, without current plan/intent to harm self on unit. Endorses auditory hallucinations, denies visual homicidal ideations. Actively responding to internal stimuli.               Denies hallucinations              Denies delusions              Denies paranoia  Cognition:  Oriented to self, location  Concentration: Reduced  Memory: Impaired  Insight &Judgment: Poor           DSM-5 Diagnosis    Principal Problem: Depression with suicidal ideation    Polysubstance abuse- cocaine opioids    Psychosocial and Contextual factors:  Financial   Occupational X  Relationship   Legal   Living situation X  Educational     Past Medical History:   Diagnosis Date    Anxiety     Arthritis     Asthma     Bipolar I disorder, most recent episode (or current) depressed, unspecified 9/12/2014    Clostridium difficile infection     COPD (chronic obstructive pulmonary disease) [facility]     Depression     Disease of blood and blood forming organ     Eczema     Fracture, metacarpal     R 4th and 5th    Gastric ulcer     Gastritis 06/13/2018    GERD (gastroesophageal reflux disease)     GI bleed     H. 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 Home medications reviewed. Medications as determined by attending physician  Will start Zoloft and titrate to effect   Will monitor for any withdrawals  Monitor need and frequency of PRN medications. Attempt to develop insight. Follow-up daily while inpatient. Reviewed risks and benefits as well as potential side effects with patient.     CONSULT:  [x] Yes [] No  Internal medicine for medical management/medical H&P  Nutritional consult    Risk Management: close watch per standard protocol      Psychotherapy: participation in milieu and group and individual sessions with Attending Physician,  and Physician Assistant/CNP      Estimated length of stay:  2-14 days      GENERAL PATIENT/FAMILY EDUCATION  Patient will understand basic signs and symptoms, patient will understand benefits/risks and potential side effects from proposed medications, and patient will understand their role in recovery. Family is not active in patient's care. Patient assets that may be helpful during treatment include: Intent to participate and engage in treatment, sufficient fund of knowledge and intellect to understand and utilize treatments. Goals:    1) Remission of depression  2) Stabilization of symptoms prior to discharge. 3) Establish efficacy and tolerability of medications. Behavioral Services  Medicare Certification     Admission Day 1  I certify that this patient's inpatient psychiatric hospital admission is medically necessary for:    x (1) treatment which could reasonably be expected to improve this patient's condition, or    x (2) diagnostic study or its equivalent. Time Spent: 60 minutes    Rubi Chacon is a 28 y.o. male being evaluated face to face    --Michelle Maravilla MD on 1/30/2023 at 4:04 PM    An electronic signature was used to authenticate this note.

## 2023-01-30 NOTE — BH NOTE
[facility]  Admission Note     Admission Type:   Admission Type: Involuntary    Reason for admission:  Reason for Admission: was seen at [facility] for abdominal pain, became aggressive with staff and stated he was suicidal; reported OD on 10mg cyclobenzaprine      Addictive Behavior:   Addictive Behavior  In the Past 3 Months, Have You Felt or Has Someone Told You That You Have a Problem With  : None    Medical Problems:   Past Medical History:   Diagnosis Date    Anxiety     Arthritis     Asthma     Bipolar I disorder, most recent episode (or current) depressed, unspecified 9/12/2014    Clostridium difficile infection     COPD (chronic obstructive pulmonary disease) ([facility])     Depression     Disease of blood and blood forming organ     Eczema     Fracture, metacarpal     R 4th and 5th    Gastric ulcer     Gastritis 06/13/2018    GERD (gastroesophageal reflux disease)     GI bleed     H. 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[facility])     Substance abuse ([facility])     Hx of opiates, benzos, cocaine, alcohol abuse    Suicidal ideation     Suicide attempt by hanging ([facility])     Tobacco dependence     Ulcerative colitis ([facility])     UTI (urinary tract infection)        Status EXAM:  Mental Status and Behavioral Exam  Normal: No  Level of Assistance: Independent/Self  Facial Expression: Avoids Gaze  Affect: Blunt  Level of Consciousness: Alert  Frequency of Checks: 4 times per hour, close  Mood:Normal: No  Mood: Irritable, Angry, Anxious  Motor Activity:Normal: Yes  Eye Contact: Poor  Observed Behavior: Agitated  Sexual Misconduct History: Current - no  Preception: [place] to  person, [place] to time, [place] to place, [place] to situation  Attention:Normal: No  Attention: Unable to concentrate  Thought Processes: Blocking  Thought Content:Normal: No  Thought Content: Poverty of content  Depression Symptoms: Impaired concentration, Increased irritability  Anxiety Symptoms: Generalized  Elizabeth Symptoms: No problems reported or observed.   Hallucinations: None  Delusions: No  Memory:Normal: Yes  Insight and Judgment: No  Insight and Judgment: Poor insight, Poor judgment    Tobacco Screening:  Practical Counseling, on admission, reuben X, if applicable and completed (first 3 are required if patient doesn't refuse):            ( ) Recognizing danger situations (included triggers and roadblocks)                    ( ) Coping skills (new ways to manage stress,relaxation techniques, changing routine, distraction)                                                           ( ) Basic information about quitting (benefits of quitting, techniques in how to quit, available resources  ( ) Referral for counseling faxed to [facility]                                                                                                                   (X) Patient refused counseling  ( ) Patient has not smoked in the last 30 days    Metabolic Screening:    Lab Results   Component Value Date    LABA1C 5.7 10/23/2014       Lab Results   Component Value Date    CHOL 205 (H) 02/21/2017    CHOL 158 11/09/2015    CHOL 152 10/23/2014    CHOL 206 (H) 03/05/2014    CHOL 205 (H) 01/22/2014    CHOL 208 (H) 08/28/2013     Lab Results   Component Value Date    TRIG 189 (H) 02/21/2017    TRIG 223 (H) 11/09/2015    TRIG 52 10/23/2014    TRIG 104 03/05/2014    TRIG 74 01/22/2014    TRIG 104 08/28/2013     Lab Results   Component Value Date    HDL 44 02/21/2017    HDL 40 (L) 11/09/2015    HDL 55 10/23/2014    HDL 54 03/05/2014    HDL 53 01/22/2014    HDL 72 (H) 08/28/2013     No components found for: LDLCAL  No results found for: LABVLDL      Body mass index is 28.98 kg/m². BP Readings from Last 2 Encounters:   01/29/23 128/73   01/28/23 (!) 154/70           Pt admitted with followings belongings:  Dental Appliances: None  Vision - Corrective Lenses: None  Hearing Aid: None  Jewelry: None  Body Piercings Removed: No  Clothing: Undergarments, Shirt, Pants, Belt, Jacket/Coat  Other Valuables: Lighter/Matches, Other (Comment) (visa:3525; cyclobenzaprine 10mg)    patient to [facility] for abdominal pain where medical workup was negative. Patient became aggressive with their staff and reported he was suicidal and OD on 10mg cyclobenzaprine. Patient has history of alcohol abuse and opioid use-denies currently and UDS negative and prescribed Suboxone. Patient admitted on a pink slip, did not sign in, refused vitals and assessments. Patient has multiple flags for violence in chart towards multiple hospital staff. Denied SI upon admission and demanded to see a doctor because he shouldn't be here.     Emory Schirmer, RN

## 2023-01-30 NOTE — GROUP NOTE
Group Therapy Note    Date: 1/30/2023    Group Start Time: 1430  Group End Time: 6806  Group Topic: Psychoeducation    TIESHA Pickering, CTRS        Group Therapy Note    Attendees: 1/11    Psych-Ed/Relapse Prevention Group Note        Date: January 30, 2023 Start Time: 2:30pm  End Time: 3:10pm      Number of Participants in Group & Unit Census:  1/11    Topic: interpersonal skills, coping skills, self-expression    Goal of Group: To improve interpersonal skills and coping skills awareness through collaborating with peers and demonstrating self-expression. Comments:     Patient did not participate in Psych-Ed/Relapse Prevention group, despite staff encouragement and explanation of benefits. Patient remain seclusive to self. Q15 minute safety checks maintained for patient safety and will continue to encourage patient to attend unit programming.         Signature:  DIANE Vieyra

## 2023-01-30 NOTE — GROUP NOTE
Group Therapy Note    Date: 1/30/2023    Group Start Time: 1100  Group End Time: 1150  Group Topic: Cognitive Skills    [facility] [place], [place]        Group Therapy Note    Attendees: 1/11    Cognitive Skills Group Note        Date: January 30, 2023 Start Time: 11am  End Time: 11:50am      Number of Participants in Group & Unit Census:  1/11    Topic:  concentration, decision-making, interpersonal skills    Goal of Group: To improve decision-making and interpersonal skills through collaborating with peers and concentrating on a presented task. Comments:     Patient did not participate in Cognitive Skills group, despite staff encouragement and explanation of benefits. Patient remain seclusive to self. Q15 minute safety checks maintained for patient safety and will continue to encourage patient to attend unit programming.         Signature:  DIANE Smith

## 2023-01-30 NOTE — BH NOTE
Dr. Carmel Patel notified of best practice advisory suggesting to place patient on suicide precautions.  Provider to discontinue the order as patient does not meet criteria for suicide precautions at this time

## 2023-01-30 NOTE — BH NOTE
Patient given tobacco quitline number [phone] at this time, refusing to call at this time, states \" I just dont want to quit now\"- patient given information as to the dangers of long term tobacco use. Continue to reinforce the importance of tobacco cessation.

## 2023-01-30 NOTE — PLAN OF CARE
[facility]  Initial Interdisciplinary Treatment Plan NO      Original treatment plan Date & Time: 1/30/2023 1000    Admission Type:  Admission Type:  Involuntary    Reason for admission:   Reason for Admission: was seen at [facility] for abdominal pain, became aggressive with staff and stated he was suicidal; reported OD on 10mg cyclobenzaprine    Estimated Length of Stay:  5-7days  Estimated Discharge Date: to be determined by physician    PATIENT STRENGTHS:  Patient Strengths:   Patient Strengths and Limitations:   Addictive Behavior: Addictive Behavior  In the Past 3 Months, Have You Felt or Has Someone Told You That You Have a Problem With  : None  Medical Problems:  Past Medical History:   Diagnosis Date    Anxiety     Arthritis     Asthma     Bipolar I disorder, most recent episode (or current) depressed, unspecified 9/12/2014    Clostridium difficile infection     COPD (chronic obstructive pulmonary disease) ([address]     Depression     Disease of blood and blood forming organ     Eczema     Fracture, metacarpal     R 4th and 5th    Gastric ulcer     Gastritis 06/13/2018    GERD (gastroesophageal reflux disease)     GI bleed     H. 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[address]     Substance abuse ([address]     Hx of opiates, benzos, cocaine, alcohol abuse    Suicidal ideation     Suicide attempt by hanging ([address]     Tobacco dependence     Ulcerative colitis ([address]     UTI (urinary tract infection)      Status EXAM:Mental Status and Behavioral Exam  Normal: No  Level of Assistance: Independent/Self  Facial Expression: Avoids Gaze  Affect: Blunt  Level of Consciousness: Alert  Frequency of Checks: 4 times per hour, close  Mood:Normal: No  Mood: Irritable, Angry, Anxious  Motor Activity:Normal: Yes  Eye Contact: Poor  Observed Behavior: Agitated  Sexual Misconduct History: Current - no  Preception: [place] to person, [place] to time, [place] to place, [place] to situation  Attention:Normal: No  Attention: Unable to concentrate  Thought Processes: Blocking  Thought Content:Normal: No  Thought Content: Poverty of content  Depression Symptoms: Impaired concentration, Increased irritability  Anxiety Symptoms: Generalized  Elizabeth Symptoms: No problems reported or observed.   Hallucinations: None  Delusions: No  Memory:Normal: Yes  Insight and Judgment: No  Insight and Judgment: Poor insight, Poor judgment    EDUCATION:   Learner Progress Toward Treatment Goals: reviewed group plans and strategies for care    Method:group therapy, medication compliance, individualized assessments and care planning    Outcome: needs reinforcement    PATIENT GOALS: to be discussed with patient within 72 hours    PLAN/TREATMENT RECOMMENDATIONS:     continue group therapy , medications compliance, goal setting, individualized assessments and care, continue to monitor pt on unit      SHORT-TERM GOALS: per psychoeducational specialist, patient is unable to attend treatment team to discuss goals at this time  Time frame for Short-Term Goals: 5-7 days    LONG-TERM GOALS: per psychoeducational specialist, patient is unable to attend treatment team to discuss goals at this time  Time frame for Long-Term Goals: 6 months  Members Present in Team Meeting: See Signature Sheet    Aria Dixon RN

## 2023-01-30 NOTE — PROGRESS NOTES
Behavioral Services  Medicare Certification Upon Admission    I certify that this patient's inpatient psychiatric hospital admission is medically necessary for:    [x] (1) Treatment which could reasonably be expected to improve this patient's condition,       [x] (2) Or for diagnostic study;     AND     [x](2) The inpatient psychiatric services are provided while the individual is under the care of a physician and are included in the individualized plan of care.     Estimated length of stay/service 3-5 days    Plan for post-hospital care hc    Electronically signed by Wendy Whiting MD on 1/30/2023 at 9:29 AM

## 2023-01-30 NOTE — H&P
[facility] Internal Medicine  Jyoti Ag MD; Eleazar Lau MD; Ramya Arthur MD; MD Ciara Duff MD; MD [facility]   [address] / HISTORY AND PHYSICAL EXAMINATION            Date:   1/30/2023  Patient name:  Amna Irizarry  Date of admission:  1/30/2023  8:34 AM  MRN:   [id number]  Account:  [de-identified]  YOB: 1990  PCP:    Jean Foley MD  Room:   [address] [place] [address]  Code Status:    Full Code    Physician Requesting Consult: Prikevin Valentine, *    Reason for Consult: History and physical examination    Chief Complaint:     No chief complaint on file.   Suicidal    History Obtained From:     patient    History of Present Illness:     70-year-old gentleman with underlying history of anxiety and depression, extensive past medical history with obesity, obstructive sleep apnea, GERD, COPD, current smoker, admitted to inpatient psych for suicidal ideations    Past Medical History:     Past Medical History:   Diagnosis Date    Anxiety     Arthritis     Asthma     Bipolar I disorder, most recent episode (or current) depressed, unspecified 9/12/2014    Clostridium difficile infection     COPD (chronic obstructive pulmonary disease) ([address]     Depression     Disease of blood and blood forming organ     Eczema     Fracture, metacarpal     R 4th and 5th    Gastric ulcer     Gastritis 06/13/2018    GERD (gastroesophageal reflux disease)     GI bleed     H. 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[address]     Substance abuse ([address] Hx of opiates, benzos, cocaine, alcohol abuse    Suicidal ideation     Suicide attempt by hanging [facility]     Tobacco dependence     Ulcerative colitis ([facility]     UTI (urinary tract infection)         Past Surgical History:     Past Surgical History:   Procedure Laterality Date    ABDOMEN SURGERY      upper GI scope 7/7/2015    BRONCHOSCOPY      CHOLECYSTECTOMY, LAPAROSCOPIC  07/14/2017    surgery performed at [facility], COLON, DIAGNOSTIC      OTHER SURGICAL HISTORY      lumbar puncture    GA COLONOSCOPY W/BIOPSY SINGLE/MULTIPLE  8/9/2017    COLONOSCOPY WITH BIOPSY performed by Erica Khan MD at [facility] EGD TRANSORAL BIOPSY SINGLE/MULTIPLE N/A 3/20/2017    EGD BIOPSY performed by Pattie Malik MD at [facility] Endoscopy    GA ESOPHAGOGASTRODUODENOSCOPY TRANSORAL DIAGNOSTIC N/A 8/9/2017    EGD ESOPHAGOGASTRODUODENOSCOPY performed by Erica Khan MD at [address] [place] [address] N/A 3/20/2017    SIGMOIDOSCOPY DIAGNOSTIC FLEXIBLE performed by Pattie Malik MD at [place] [address]  2/4/16    UPPER GASTROINTESTINAL ENDOSCOPY N/A 6/13/2018    GASTRITIS    UPPER GASTROINTESTINAL ENDOSCOPY N/A 9/20/2018    EGD BIOPSY performed by Erika Noriega MD at [address] [place] [address] [address]        Medications Prior to Admission:     Prior to Admission medications    Medication Sig Start Date End Date Taking? Authorizing Provider   cyclobenzaprine (FLEXERIL) 10 MG tablet Take 10 mg by mouth 3 times daily as needed for Muscle spasms   Yes Historical Provider, MD   traZODone (DESYREL) 50 MG tablet Take 50 mg by mouth nightly as needed for Sleep   Yes Historical Provider, MD   SUBOXONE 8-2 MG FILM SL film Place 2 Film under the tongue daily. Indications: last dispensed 01/24/23 1/24/23   Historical Provider, MD   gabapentin (NEURONTIN) 300 MG capsule Take 1 capsule by mouth 3 times daily.  Indications: last dispensed 1/19/23 1/19/23 Historical Provider, MD   metFORMIN (GLUCOPHAGE) 500 MG tablet Take 1 tablet by mouth 2 times daily (with meals) Indications: last dispensed 1/6/23 1/6/23   Historical Provider, MD   ketoconazole (NIZORAL) 2 % cream Apply topically daily to chest for 14 days 1/29/23   Gavino Guerrero DO   permethrin (ELIMITE) 5 % cream Apply from neck down. Please leave on for 8 to 12 hours before washing it off. 1/15/23   Andrea Antonio MD   triamcinolone (KENALOG) 0.1 % cream Apply topically 2 times daily for 1 week. 1/14/23   Bridgette Hutchinson PA-C   acetaminophen (TYLENOL) 325 MG tablet Take 1 tablet by mouth every 6 hours as needed for Pain 1/12/23   Shauna Merino DO   QUEtiapine (SEROQUEL) 100 MG tablet Take 100 mg by mouth at bedtime Indications: last dispensed 1/19/23 10/31/22   Historical Provider, MD   tiZANidine (ZANAFLEX) 4 MG tablet Take 1 tablet by mouth every 6 hours as needed (muscle pain)  Patient taking differently: Take 4 mg by mouth every 6 hours as needed (muscle pain) Indications: last dispensed 1/6/23 12/4/22   Zully Toscano MD   hydrocortisone 1 % cream Apply topically 2 -3 times daily for 5 - 7 days. 12/4/22   Zully Toscano MD   meloxicam (MOBIC) 7.5 MG tablet Take 1 tablet by mouth 2 times daily as needed for Pain 8/19/21 10/30/21  MASSIEL Rojas - CNP        Allergies:     Dicyclomine, Famotidine, Geodon [ziprasidone hcl], Haloperidol, Iv dye [iodides], Reglan [metoclopramide], Ibuprofen, Aspirin, Dicyclomine hcl, Hydroxyzine hcl, Iodine, Metronidazole, Mirtazapine, Ondansetron hcl, Promethazine, and Ziprasidone    Social History:     Tobacco:    reports that he has been smoking cigarettes. He has a 7.50 pack-year smoking history. He has never used smokeless tobacco.  Alcohol:      reports that he does not currently use alcohol. Drug Use:  reports current drug use. Drug: Cocaine.     Family History:     Family History   Problem Relation Age of Onset    Diabetes Father Alcohol Abuse Father     Depression Father     Arthritis Father     High Blood Pressure Father     Other Father         aneurysm & epilepsy    Migraines Father     Arthritis Mother     Other Mother         aneurysm & epilepsy    Migraines Mother     Diabetes Brother         Aunt and uncles    Depression Brother     Mental Illness Brother     Other Brother         epilepsy    Migraines Brother     Stroke Other         Uncle    Other Brother         murdered Oct 6th, 2014    Colon Cancer Paternal Cousin 43    Other Sister         epilepsy    Migraines Sister        Review of Systems:     Positive and Negative as described in HPI. Physical Exam:     Ht 5' 7\" (1.702 m)   Wt 185 lb (83.9 kg)   BMI 28.98 kg/m²   No data recorded. No results for input(s): POCGLU in the last 72 hours. No intake or output data in the 24 hours ending 01/30/23 1807    General Appearance:  alert, well appearing, and in no acute distress  Mental status: oriented to person, place, and time with normal affect  Head:  normocephalic, atraumatic. Lungs: Bilateral equal air entry, clear to ausculation, no wheezing, rales or rhonchi, normal effort  Cardiovascular: normal rate, regular rhythm, no murmur, gallop, rub. Abdomen: Soft, nontender, nondistended, normal bowel sounds, no hepatomegaly or splenomegaly  Neurologic: There are no new focal motor or sensory deficits, normal muscle tone and bulk, no abnormal sensation, normal speech, cranial nerves II through XII grossly intact  Skin: No gross lesions, rashes, bruising or bleeding on exposed skin area  Extremities:  peripheral pulses palpable, no pedal edema or calf pain with palpation      Investigations:      Laboratory Testing:  No results found for this or any previous visit (from the past 24 hour(s)). Imaging/Diagonstics:  No results found.     Assessment :      Hospital Problems             Last Modified POA    * (Principal) Depression with suicidal ideation 1/30/2023 Yes Cocaine abuse ([facility] 1/30/2023 Yes    PUD (peptic ulcer disease) 1/30/2023 Yes    Gastroesophageal reflux disease without esophagitis 1/30/2023 Yes    Prediabetes 1/30/2023 Yes    Smoker 1/30/2023 Yes    Opioid abuse ([address] 1/30/2023 Yes    Noncompliance 1/30/2023 Yes    SRIRAM (obstructive sleep apnea) 1/30/2023 Yes    Primary insomnia 1/30/2023 Yes    Anemia 1/30/2023 Yes    Opioid type dependence, continuous ([address] (Chronic) 1/30/2023 Yes    Diabetes mellitus type 2 in obese ([facility] 1/30/2023 Yes    Mixed hyperlipidemia 1/30/2023 Yes       Plan:     57-year-old gentleman with underlying history of anxiety and depression admitted patient psych for suicidal ideations,  Cocaine abuse, no chest pain at this time, EKG reviewed,  Peptic ulcer disease, continue Protonix,  GERD, continue Protonix,  Current smoker, advised to quit smoking,  Opioid abuse, watch for any constipation, as needed medication,  Noncompliance,  Obstructive sleep apnea, not using CPAP at home,  Anemia, no active bleeding at this time we will monitor,  Type 2 diabetes, check A1c, continue to check sugars,  Mixed hyperlipidemia, not on any statins at this time, check lipid profile,  DVT prophylaxis, patient mobile,  Full CODE STATUS    Consultations:   Tejas Kramer MD  1/30/2023  6:07 PM    Copy sent to Dr. Josy Mendoza MD    Please note that this chart was generated using voice recognition Dragon dictation software. Although every effort was made to ensure the accuracy of this automated transcription, some errors in transcription may have occurred.

## 2023-01-30 NOTE — CARE COORDINATION
BHI Biopsychosocial Assessment    Current Level of Psychosocial Functioning     Independent X  Dependent    Minimal Assist     Comments:    Psychosocial High Risk Factors (check all that apply)    Unable to obtain meds   Chronic illness/pain    Substance abuse   Lack of Family Support   Financial stress   Isolation   Inadequate Community Resources  Suicide attempt(s) X  Not taking medications X  Victim of crime   Developmental Delay  Unable to manage personal needs    Age 72 or older   Homeless X  No transportation   Readmission within 30 days  Unemployment  Traumatic Event    Comments:   Psychiatric Advanced Directives: n/a    Family to [employer] in Treatment: RANDAL    Sexual Orientation:  n/a    Patient Strengths: insurance, income    Patient Barriers: non-compliance with treatment,       Opiate Education Provided:  no- not at this time      CMHC/mental health history: history with [facility]    Plan of Care   medication management, group/individual therapies, family meetings, psycho -education, treatment team meetings to assist with stabilization    Initial Discharge Plan:  RANDAL      Clinical Summary:    Melissa Byers is a 29 y/o male admitted to [place] [address] for an attempted suicide by overdose. Patient was initially seen at [facility] but then transferred to [facility] when he expressed suicidal ideations. His labs were positive for cocaine and he is currently using suboxone. Patient is known the [facility] staff and was last admitted here in June of 2022. He has history with [facility] but is not compliant.  will continue to engage patient in treatment and discharge planning.

## 2023-01-30 NOTE — PROGRESS NOTES
Pharmacy Medication History Note      List of current medications patient is taking is complete. Source of information: [facility], [address], [place]    Changes made to medication list:  Medications removed (include reason, ex. therapy complete or physician discontinued, noncompliance): Alprazolam (list clean up), Clonidine (list clean up), Zinc Oxide (list clean up), Aripiprazole (list clean up), Cetirizine (list clean up)    Medications added/doses adjusted: Added Cyclobenzaprine 10 mg three times daily as needed  Added Trazodone 50 mg nightly as needed      Current Home Medication List at Time of Admission:  Prior to Admission medications    Medication Sig   cyclobenzaprine (FLEXERIL) 10 MG tablet Take 10 mg by mouth 3 times daily as needed for Muscle spasms   traZODone (DESYREL) 50 MG tablet Take 50 mg by mouth nightly as needed for Sleep   SUBOXONE 8-2 MG FILM SL film Place 2 Film under the tongue daily. Indications: last dispensed 01/24/23   gabapentin (NEURONTIN) 300 MG capsule Take 1 capsule by mouth 3 times daily. Indications: last dispensed 1/19/23   metFORMIN (GLUCOPHAGE) 500 MG tablet Take 1 tablet by mouth 2 times daily (with meals) Indications: last dispensed 1/6/23   ketoconazole (NIZORAL) 2 % cream Apply topically daily to chest for 14 days   permethrin (ELIMITE) 5 % cream Apply from neck down. Please leave on for 8 to 12 hours before washing it off.   triamcinolone (KENALOG) 0.1 % cream Apply topically 2 times daily for 1 week.    acetaminophen (TYLENOL) 325 MG tablet Take 1 tablet by mouth every 6 hours as needed for Pain   QUEtiapine (SEROQUEL) 100 MG tablet Take 100 mg by mouth at bedtime Indications: last dispensed 1/19/23   tiZANidine (ZANAFLEX) 4 MG tablet Take 1 tablet by mouth every 6 hours as needed (muscle pain)  Patient taking differently: Take 4 mg by mouth every 6 hours as needed (muscle pain) Indications: last dispensed 1/6/23   hydrocortisone 1 % cream Apply topically 2 -3 times daily for 5 - 7 days. Please let me know if you have any questions about this encounter. Thank you!     Electronically signed by Casey Singh, [facility] on 1/30/2023 at 2:21 PM

## 2023-01-31 LAB
ESTIMATED AVERAGE GLUCOSE: 117 MG/DL
HBA1C MFR BLD: 5.7 % (ref 4–6)

## 2023-01-31 PROCEDURE — 6360000002 HC RX W HCPCS: Performed by: NURSE PRACTITIONER

## 2023-01-31 PROCEDURE — 6370000000 HC RX 637 (ALT 250 FOR IP): Performed by: INTERNAL MEDICINE

## 2023-01-31 PROCEDURE — 6370000000 HC RX 637 (ALT 250 FOR IP): Performed by: PSYCHIATRY & NEUROLOGY

## 2023-01-31 PROCEDURE — 99232 SBSQ HOSP IP/OBS MODERATE 35: CPT

## 2023-01-31 PROCEDURE — 2040000000 HC PSYCH ICU R&B

## 2023-01-31 PROCEDURE — 1240000000 HC EMOTIONAL WELLNESS R&B

## 2023-01-31 RX ADMIN — NICOTINE POLACRILEX 2 MG: 2 GUM, CHEWING BUCCAL at 13:20

## 2023-01-31 RX ADMIN — NICOTINE POLACRILEX 2 MG: 2 GUM, CHEWING BUCCAL at 11:01

## 2023-01-31 RX ADMIN — DIPHENHYDRAMINE HYDROCHLORIDE 50 MG: 50 INJECTION, SOLUTION INTRAMUSCULAR; INTRAVENOUS at 15:20

## 2023-01-31 RX ADMIN — DIPHENHYDRAMINE HYDROCHLORIDE 50 MG: 50 INJECTION, SOLUTION INTRAMUSCULAR; INTRAVENOUS at 09:44

## 2023-01-31 RX ADMIN — TRAZODONE HYDROCHLORIDE 50 MG: 50 TABLET ORAL at 19:30

## 2023-01-31 RX ADMIN — CYCLOBENZAPRINE 10 MG: 10 TABLET, FILM COATED ORAL at 17:39

## 2023-01-31 RX ADMIN — NICOTINE POLACRILEX 2 MG: 2 GUM, CHEWING BUCCAL at 15:20

## 2023-01-31 RX ADMIN — NICOTINE POLACRILEX 2 MG: 2 GUM, CHEWING BUCCAL at 17:16

## 2023-01-31 RX ADMIN — METFORMIN HYDROCHLORIDE 500 MG: 500 TABLET, FILM COATED ORAL at 17:35

## 2023-01-31 RX ADMIN — NICOTINE POLACRILEX 2 MG: 2 GUM, CHEWING BUCCAL at 21:00

## 2023-01-31 RX ADMIN — NICOTINE POLACRILEX 2 MG: 2 GUM, CHEWING BUCCAL at 08:46

## 2023-01-31 RX ADMIN — NICOTINE POLACRILEX 2 MG: 2 GUM, CHEWING BUCCAL at 19:30

## 2023-01-31 RX ADMIN — SERTRALINE HYDROCHLORIDE 25 MG: 25 TABLET ORAL at 08:49

## 2023-01-31 RX ADMIN — METFORMIN HYDROCHLORIDE 500 MG: 500 TABLET, FILM COATED ORAL at 08:49

## 2023-01-31 RX ADMIN — DIPHENHYDRAMINE HYDROCHLORIDE 50 MG: 50 INJECTION, SOLUTION INTRAMUSCULAR; INTRAVENOUS at 20:59

## 2023-01-31 ASSESSMENT — PAIN SCALES - GENERAL
PAINLEVEL_OUTOF10: 0
PAINLEVEL_OUTOF10: 6

## 2023-01-31 ASSESSMENT — PAIN DESCRIPTION - LOCATION: LOCATION: BACK

## 2023-01-31 NOTE — GROUP NOTE
Group Therapy Note    Date: 1/31/2023    Group Start Time: 6714  Group End Time: 3605  Group Topic: Music Therapy    [facility]    Yoav Johnston        Group Therapy Note    Attendees: 1/1       Patient's Goal:  Patient requested 1:1; Goals to decrease anxiety; Increase elf-expression; normalization of the environment    Notes:  Patinet requested 1:1. Asked if it had to be in day area. Expressed to patient it could be in his room, patient requested in his room due to loud milieu and intrusiveness of peers. Patient requested music and was singing along to music throughout. Expressed the music he picked made him \"feel\" and get out his emotions. Patient was noticeably brighter at end of 1:1 session. Requested a mix of music about feeling loss, and praise/Tenriism music    Status After Intervention:  Improved    Participation Level:  Active Listener and Interactive    Participation Quality: Appropriate, Attentive, and Sharing      Speech:  normal      Thought Process/Content: Logical  Linear      Affective Functioning: Congruent      Mood: euthymic      Level of consciousness:  Alert and Attentive      Response to Learning: Able to verbalize current knowledge/experience, Capable of insight, and Progressing to goal      Endings: None Reported    Modes of Intervention: Support, Activity, and Media      Discipline Responsible: Psychoeducational Specialist      Signature:  Yoav Johnston

## 2023-01-31 NOTE — PLAN OF CARE
Problem: Self Harm/Suicidality  Goal: Will have no self-injury during hospital stay  Description: INTERVENTIONS:  1. Ensure constant observer at bedside with Q15M safety checks  2. Maintain a safe environment  3. Secure patient belongings  4. Ensure family/visitors adhere to safety recommendations  5. Ensure safety tray has been added to patient's diet order  6. Every shift and PRN: Re-assess suicidal risk via Frequent Screener    1/31/2023 0909 by Robles Pena  Outcome: Progressing     Problem: Anxiety  Goal: Will report anxiety at manageable levels  Description: INTERVENTIONS:  1. Administer medication as ordered  2. Teach and rehearse alternative coping skills  3. Provide emotional support with 1:1 interaction with staff  1/31/2023 0909 by Robles Pena  Outcome: Progressing    Patient is isolative to room and bed, flat affect, comes out to eat, but stands at the desk, eats quickly and drops a lot of food on the ground and walks away. Talks to staff briefly, does take morning medications. Says he is cold, but when an extra blanket was brought to him he refused and was a little irritable. Does maintain control at this time. Denies any suicidal, homicidal thoughts, denies any hallucinations. When asked why he came in, he says something about the doctors in the emergency room not helping him. Mumbles and is difficult to understand at times. Accepting of basic support only. Refuses vitals.

## 2023-01-31 NOTE — PLAN OF CARE
Problem: Chronic Conditions and Co-morbidities  Goal: Patient's chronic conditions and co-morbidity symptoms are monitored and maintained or improved  Outcome: Progressing     Problem: Self Harm/Suicidality  Goal: Will have no self-injury during hospital stay  Description: INTERVENTIONS:  1. Ensure constant observer at bedside with Q15M safety checks  2. Maintain a safe environment  3. Secure patient belongings  4. Ensure family/visitors adhere to safety recommendations  5. Ensure safety tray has been added to patient's diet order  6. Every shift and PRN: Re-assess suicidal risk via Frequent Screener    Outcome: Progressing     Problem: Depression  Goal: Will be euthymic at discharge  Description: INTERVENTIONS:  1. Administer medication as ordered  2. Provide emotional support via 1:1 interaction with staff  3. Encourage involvement in milieu/groups/activities  4. Monitor for social isolation  Outcome: Progressing     Problem: Behavior  Goal: Pt/Family maintain appropriate behavior and adhere to behavioral management agreement, if implemented  Description: INTERVENTIONS:  1. Assess patient/family's coping skills and  non-compliant behavior (including use of illegal substances)  2. Notify security of behavior or suspected illegal substances which indicate the need for search of the family and/or belongings  3. Encourage verbalization of thoughts and concerns in a socially appropriate manner  4. Utilize positive, consistent limit setting strategies supporting safety of patient, staff and others  5. Encourage participation in the decision making process about the behavioral management agreement  6. If a visitor's behavior poses a threat to safety call refer to organization policy. 7. Initiate consult with , Psychosocial CNS, Spiritual Care as appropriate  Outcome: Progressing     Problem: Anxiety  Goal: Will report anxiety at manageable levels  Description: INTERVENTIONS:  1.  Administer medication as ordered  2. Teach and rehearse alternative coping skills  3. Provide emotional support with 1:1 interaction with staff  Outcome: Progressing     Problem: Involuntary Admit  Goal: Will cooperate with staff recommendations and doctor's orders and will demonstrate appropriate behavior  Description: INTERVENTIONS:  1. Treat underlying conditions and offer medication as ordered  2. Educate regarding involuntary admission procedures and rules  3.  Contain excessive/inappropriate behavior per unit and hospital policies  Outcome: Progressing     Problem: Risk for Elopement  Goal: Patient will not exit the unit/facility without proper excort  Outcome: Progressing

## 2023-01-31 NOTE — GROUP NOTE
Group Therapy Note    Date: 1/31/2023    Group Start Time: 1030  Group End Time: 8268  Group Topic: Recreational    [facility]    Apoorva Morel        Group Therapy Note    Attendees: 2/6       Patient's Goal:  Demonstrate positive use of time; Increase self-expression    Notes:  Patient attended last 15 minutes, and declined playing card games with this writer and peer. Stated he just wanted to listen to music and request some songs. Sang along quietly. Appeared solemn/lethargic and asked if this writer would could back at a later time to listen to music with him    Status After Intervention:  Improved    Participation Level:  Active Listener and Minimal    Participation Quality: Appropriate and Lethargic      Speech:  normal      Thought Process/Content: Logical  Linear      Affective Functioning: Constricted/Restricted      Mood: depressed      Level of consciousness:  Alert and Attentive      Response to Learning: Progressing to goal      Endings: None Reported    Modes of Intervention: Support, Socialization, Activity, and Media      Discipline Responsible: Psychoeducational Specialist      Signature:  Apoorva Morel

## 2023-02-01 PROCEDURE — 1240000000 HC EMOTIONAL WELLNESS R&B

## 2023-02-01 PROCEDURE — 6370000000 HC RX 637 (ALT 250 FOR IP): Performed by: NURSE PRACTITIONER

## 2023-02-01 PROCEDURE — 6370000000 HC RX 637 (ALT 250 FOR IP): Performed by: PSYCHIATRY & NEUROLOGY

## 2023-02-01 PROCEDURE — 6360000002 HC RX W HCPCS: Performed by: NURSE PRACTITIONER

## 2023-02-01 PROCEDURE — 99231 SBSQ HOSP IP/OBS SF/LOW 25: CPT

## 2023-02-01 PROCEDURE — 2040000000 HC PSYCH ICU R&B

## 2023-02-01 PROCEDURE — 6370000000 HC RX 637 (ALT 250 FOR IP): Performed by: INTERNAL MEDICINE

## 2023-02-01 RX ORDER — SERTRALINE HYDROCHLORIDE 25 MG/1
25 TABLET, FILM COATED ORAL DAILY
Qty: 30 TABLET | Refills: 3 | Status: SHIPPED | OUTPATIENT
Start: 2023-02-02

## 2023-02-01 RX ADMIN — DIPHENHYDRAMINE HYDROCHLORIDE 50 MG: 50 INJECTION, SOLUTION INTRAMUSCULAR; INTRAVENOUS at 08:34

## 2023-02-01 RX ADMIN — DIPHENHYDRAMINE HYDROCHLORIDE 50 MG: 25 TABLET ORAL at 19:38

## 2023-02-01 RX ADMIN — CYCLOBENZAPRINE 10 MG: 10 TABLET, FILM COATED ORAL at 18:49

## 2023-02-01 RX ADMIN — TRAZODONE HYDROCHLORIDE 50 MG: 50 TABLET ORAL at 19:38

## 2023-02-01 RX ADMIN — SERTRALINE HYDROCHLORIDE 25 MG: 25 TABLET ORAL at 08:28

## 2023-02-01 RX ADMIN — NICOTINE POLACRILEX 2 MG: 2 GUM, CHEWING BUCCAL at 08:34

## 2023-02-01 RX ADMIN — NICOTINE POLACRILEX 2 MG: 2 GUM, CHEWING BUCCAL at 13:58

## 2023-02-01 RX ADMIN — NICOTINE POLACRILEX 2 MG: 2 GUM, CHEWING BUCCAL at 18:45

## 2023-02-01 RX ADMIN — NICOTINE POLACRILEX 2 MG: 2 GUM, CHEWING BUCCAL at 15:42

## 2023-02-01 RX ADMIN — DIPHENHYDRAMINE HYDROCHLORIDE 50 MG: 50 INJECTION, SOLUTION INTRAMUSCULAR; INTRAVENOUS at 15:37

## 2023-02-01 RX ADMIN — METFORMIN HYDROCHLORIDE 500 MG: 500 TABLET, FILM COATED ORAL at 08:28

## 2023-02-01 RX ADMIN — NICOTINE POLACRILEX 2 MG: 2 GUM, CHEWING BUCCAL at 11:43

## 2023-02-01 ASSESSMENT — PAIN SCALES - GENERAL: PAINLEVEL_OUTOF10: 6

## 2023-02-01 ASSESSMENT — PAIN DESCRIPTION - LOCATION: LOCATION: BACK

## 2023-02-01 NOTE — PLAN OF CARE
Problem: Self Harm/Suicidality  Goal: Will have no self-injury during hospital stay  Description: INTERVENTIONS:  1. Ensure constant observer at bedside with Q15M safety checks  2. Maintain a safe environment  3. Secure patient belongings  4. Ensure family/visitors adhere to safety recommendations  5. Ensure safety tray has been added to patient's diet order  6. Every shift and PRN: Re-assess suicidal risk via Frequent Screener    2/1/2023 0923 by Basia Cerna LPN  Note: Patient denies suicidal/homicidal ideation. Patient agreeable to seek out staff should thoughts of self harm/harming others should arise. Patient denies hallucinations. Patient refused vitals, stating \"there's no need, I should be leaving today, I'm not even supposed to be here\". Patient reports that he took two of his muscle relaxants, instead of the ordered dose of one, and that the Emergency Department staff deemed him suicidal.Patient is positive for anxiety/depression but does not rate. Patient is medication compliant and behavior controlled. Comfort and reassurance provided. Safety checks maintained every 15 minutes and as needed. Problem: Behavior  Goal: Pt/Family maintain appropriate behavior and adhere to behavioral management agreement, if implemented  Description: INTERVENTIONS:  1. Assess patient/family's coping skills and  non-compliant behavior (including use of illegal substances)  2. Notify security of behavior or suspected illegal substances which indicate the need for search of the family and/or belongings  3. Encourage verbalization of thoughts and concerns in a socially appropriate manner  4. Utilize positive, consistent limit setting strategies supporting safety of patient, staff and others  5. Encourage participation in the decision making process about the behavioral management agreement  6. If a visitor's behavior poses a threat to safety call refer to organization policy.   7. Initiate consult with Social Worker, Psychosocial CNS, Spiritual Care as appropriate  2/1/2023 5971 by Sabiha Shetty LPN  Outcome: Progressing

## 2023-02-01 NOTE — PLAN OF CARE
[facility]  Day 3 Interdisciplinary Treatment Plan NOTE    Review Date & Time: 2/1/23     Admission Type:   Admission Type:  Involuntary    Reason for admission:  Reason for Admission: was seen at [facility] for abdominal pain, became aggressive with staff and stated he was suicidal; reported OD on 10mg cyclobenzaprine  Estimated Length of Stay:  5-7 days  Estimated Discharge Date Update:   to be determined by physician    PATIENT STRENGTHS:  Patient Strengths:   Patient Strengths and Limitations:Limitations: Difficult relationships / poor social skills, Multiple barriers to leisure interests, Difficulty problem solving/relies on others to help solve problems, Tendency to isolate self, Inappropriate/potentially harmful leisure interests  Addictive Behavior:Addictive Behavior  In the Past 3 Months, Have You Felt or Has Someone Told You That You Have a Problem With  : None  Medical Problems:  Past Medical History:   Diagnosis Date    Anxiety     Arthritis     Asthma     Bipolar I disorder, most recent episode (or current) depressed, unspecified 9/12/2014    Clostridium difficile infection     COPD (chronic obstructive pulmonary disease) ([address]     Depression     Disease of blood and blood forming organ     Eczema     Fracture, metacarpal     R 4th and 5th    Gastric ulcer     Gastritis 06/13/2018    GERD (gastroesophageal reflux disease)     GI bleed     H. 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[address]     Substance abuse ([address]     Hx of opiates, benzos, cocaine, alcohol abuse    Suicidal ideation     Suicide attempt by hanging ([address]     Tobacco dependence Ulcerative colitis ([facility]     UTI (urinary tract infection)        Risk:  Fall Risk   Ross Scale Ross Scale Score: 22  BVC    Change in scores:  No Changes to plan of Care:  No    Status EXAM:   Mental Status and Behavioral Exam  Normal: No  Level of Assistance: Independent/Self  Facial Expression: Worried  Affect: Congruent  Level of Consciousness: Alert  Frequency of Checks: 4 times per hour, close  Mood:Normal: No  Mood: Depressed, Anxious  Motor Activity:Normal: Yes  Eye Contact: Good  Observed Behavior: Guarded, Preoccupied  Sexual Misconduct History: Current - no  Preception: [place] to person, [place] to time, [place] to place, [place] to situation  Attention:Normal: No  Attention: Distractible  Thought Processes: Circumstantial  Thought Content:Normal: No  Thought Content: Poverty of content, Preoccupations  Depression Symptoms: Feelings of helplessness, Feelings of hopelessess, Feelings of worthlessness  Anxiety Symptoms: Generalized  Elizabeth Symptoms: No problems reported or observed. Hallucinations: None  Delusions: No  Memory:Normal: Yes  Insight and Judgment: No  Insight and Judgment: Poor judgment, Poor insight    Daily Assessment Last Entry:   Daily Sleep (WDL): Exceptions to WDL            Daily Nutrition (WDL): Within Defined Limits  Level of Assistance: Independent/Self    Patient Monitoring:  Frequency of Checks: 4 times per hour, close    Psychiatric Symptoms:   Depression Symptoms  Depression Symptoms: Feelings of helplessness, Feelings of hopelessess, Feelings of worthlessness  Anxiety Symptoms  Anxiety Symptoms: Generalized  Elizabeth Symptoms  Elizabeth Symptoms: No problems reported or observed.           Suicide Risk CSSR-S:  1) Within the past month, have you wished you were dead or wished you could go to sleep and not wake up? : Yes  2) Have you actually had any thoughts of killing yourself? : Yes  3) Have you been thinking about how you might kill yourself? : Yes  5) Have you started to work out or worked out the details of how to kill yourself?  Do you intend to carry out this plan? : Yes  6) Have you ever done anything, started to do anything, or prepared to do anything to end your life?: Yes  Change in Result:   Change in Plan of care:        EDUCATION:   Learner Progress Toward Treatment Goals:   Reviewed results and recommendations of this team, Reviewed group plan and strategies, Reviewed signs, symptoms and risk of self harm and violent behavior, Reviewed goals and plan of care    Method:  small group, individual verbal education    Outcome:   Verbalized by patient but needs reinforcement to obtain goals    PATIENT GOALS:  Short term:  discharge planning; go to [address] term:  medication compliance    PLAN/TREATMENT RECOMMENDATIONS UPDATE:  continue with group therapies, increased socialization, continue planning for after discharge goals, continue with medication compliance    SHORT-TERM GOALS UPDATE:   Time frame for Short-Term Goals:  5-7 days    LONG-TERM GOALS UPDATE:   Time frame for Long-Term Goals:  6 months    Members Present in Team Meeting:   See signature sheet  Rodney Vance RN

## 2023-02-01 NOTE — GROUP NOTE
Group Therapy Note    Date: 2/1/2023    Group Start Time: 0369  Group End Time: 1120  Group Topic: Assignment Group Discussion    [facility]    Harlan Ibarra        Group Therapy Note    Attendees: 4/7       Patient's Goal: Patient Advisory Group; Asked patients and documented on form, about positive aspects of units and programming, as well as feedback about improvements that could be made to unit and programming. Goals to increase self-expression; Increase sense of control; Voice feedback and concerns about milieu and available resources; Notes:  Patient engaged in Patient Advisory discussion, sharing positive aspects of milieu, resources and treatment, but had no feedback of potential changes.     Signature:  Harlan Ibarra

## 2023-02-01 NOTE — PLAN OF CARE
Problem: Chronic Conditions and Co-morbidities  Goal: Patient's chronic conditions and co-morbidity symptoms are monitored and maintained or improved  Outcome: Progressing     Problem: Self Harm/Suicidality  Goal: Will have no self-injury during hospital stay  Description: INTERVENTIONS:  1. Ensure constant observer at bedside with Q15M safety checks  2. Maintain a safe environment  3. Secure patient belongings  4. Ensure family/visitors adhere to safety recommendations  5. Ensure safety tray has been added to patient's diet order  6. Every shift and PRN: Re-assess suicidal risk via Frequent Screener    Outcome: Progressing     Problem: Depression  Goal: Will be euthymic at discharge  Description: INTERVENTIONS:  1. Administer medication as ordered  2. Provide emotional support via 1:1 interaction with staff  3. Encourage involvement in milieu/groups/activities  4. Monitor for social isolation  Outcome: Progressing     Problem: Behavior  Goal: Pt/Family maintain appropriate behavior and adhere to behavioral management agreement, if implemented  Description: INTERVENTIONS:  1. Assess patient/family's coping skills and  non-compliant behavior (including use of illegal substances)  2. Notify security of behavior or suspected illegal substances which indicate the need for search of the family and/or belongings  3. Encourage verbalization of thoughts and concerns in a socially appropriate manner  4. Utilize positive, consistent limit setting strategies supporting safety of patient, staff and others  5. Encourage participation in the decision making process about the behavioral management agreement  6. If a visitor's behavior poses a threat to safety call refer to organization policy. 7. Initiate consult with , Psychosocial CNS, Spiritual Care as appropriate  Outcome: Progressing     Problem: Anxiety  Goal: Will report anxiety at manageable levels  Description: INTERVENTIONS:  1.  Administer medication as ordered  2. Teach and rehearse alternative coping skills  3. Provide emotional support with 1:1 interaction with staff  Outcome: Progressing     Problem: Involuntary Admit  Goal: Will cooperate with staff recommendations and doctor's orders and will demonstrate appropriate behavior  Description: INTERVENTIONS:  1. Treat underlying conditions and offer medication as ordered  2. Educate regarding involuntary admission procedures and rules  3. Contain excessive/inappropriate behavior per unit and hospital policies  Outcome: Progressing     Problem: Risk for Elopement  Goal: Patient will not exit the unit/facility without proper excort  Outcome: Progressing    Patient is guarded and remains mostly isolative to his room. Patient comes out of his room for personal needs only. Patient has remained in behavioral control thus far. Patient's mood is anxious, affect is congruent. Patient's thought process is linear. Patient denies hallucinations and delusions. Patient denies suicidal and homicidal ideation, plan or intent, and remains free of self harm. Patient has been utilizing PRN medications for opioid withdrawal, patient reports not opioid withdrawal symptoms this shift. Patient has not shown any exit-seeking behaviors this shift. Patient's pain is currently WDL per patient and will continue to be assessed. Safety maintained with every 15 minute checks.

## 2023-02-01 NOTE — GROUP NOTE
Group Therapy Note    Date: 2/1/2023    Group Start Time: 9030  Group End Time: 0930  Group Topic: Community Meeting    [facility]    Johanny Salas      Group Therapy Note    Attendees: 4/7       Patient's Goal: Patient will verbalize today's goals. Patient will also offer supportive listening and interact with peers to clarify and                                understand clearly set goals. Notes: Patient is making progress AEB participating in group discussion, actively listening, and supporting other group members. Status After Intervention: Improved      Participation Level:  Active Listener and Interactive      Participation Quality: Appropriate, Attentive, Sharing, and Supportive      Speech: normal      Thought Process/Content: Logical, Linear      Affective Functioning: Congruent      Mood: anxious      Level of consciousness: Oriented x4      Response to Learning: Able to verbalize current knowledge/experience, Able to verbalize/acknowledge new learning,      Able to retain information, and Capable of insight      Endings: None Reported      Modes of Intervention: Education, Support, Socialization, and Problem-solving         Discipline Responsible: Behavorial Health Tech      Signature: Johanny Salas

## 2023-02-01 NOTE — BH NOTE
Patient requesting orange juice, stating he is feeling dizzy and lightheaded. Writer offered to check vitals, blood sugar, and blood pressure. Patient refused vitals stating he don't need that, I'll be fine. Will continue to monitor.

## 2023-02-01 NOTE — PROGRESS NOTES
Daily Progress Note  1/31/2023    Patient Name: Hemanth Gonzalez    CHIEF COMPLAINT: Suicide attempt         SUBJECTIVE:      Patient is seen today for a follow up assessment. When approached for interview patient is discharged focused. Patient is minimizing depression and suicide attempt in the community. Initially patient states he took muscle relaxers and did not care if it harmed him. Today patient is denying suicidal ideation and reports depression has improved. Patient has been compliant with scheduled medication and is denying side effects at this time. Discharge planning discussed and patient is agreeable to discharge following 2 to 3 days of continued stability. As of now plan is to discharge Thursday. Patient is not appropriate for lower level of care at this time. Appetite:  [x] Adequate/Unchanged  [] Increased  [] Decreased      Sleep:       [x] Adequate/Unchanged  [] Fair  [] Poor      Group Attendance on Unit:   [x] Yes   [] Selectively    [] No    Compliant with scheduled medications: [x] Yes  [] No    Received emergency medications in past 24 hrs: [] Yes   [x] No    Medication Side Effects: Denies         Mental Status Exam  Level of consciousness: Alert and awake. Appearance: Appropriate attire for setting, seated on bed, with fair  grooming and hygiene. Behavior/Motor: Approachable, compliant with interview  Attitude toward examiner: Cooperative, attentive, good eye contact. Speech: normal rate and normal volume   Mood:  Patient reports \" better\". Affect: Congruent  Thought processes: Linear and coherent. Thought content: Denies homicidal ideation. Suicidal Ideation: Reports improvement in suicidal ideations, without current plan, denies intent to harm self on unit. Unable to contract for safety off unit. Delusions: No evidence of delusions. Denies paranoia. Perceptual Disturbance: Patient does not appear to be responding to internal stimuli.  Denies auditory hallucinations. Denies visual hallucinations. Cognition: Oriented to person, place, time and situation. Memory: Intact. Insight: Poor. Judgement: Fair. Data   height is 5' 7\" (1.702 m) and weight is 185 lb (83.9 kg). Labs:   No visits with results within 2 Day(s) from this visit. Latest known visit with results is:   Admission on 01/29/2023, Discharged on 01/29/2023   Component Date Value Ref Range Status    WBC 01/29/2023 3.8  3.5 - 11.0 k/uL Final    RBC 01/29/2023 4.07 (A)  4.5 - 5.9 m/uL Final    Hemoglobin 01/29/2023 11.9 (A)  13.5 - 17.5 g/dL Final    Hematocrit 01/29/2023 36.3 (A)  41 - 53 % Final    MCV 01/29/2023 89.1  80 - 100 fL Final    MCH 01/29/2023 29.2  26 - 34 pg Final    MCHC 01/29/2023 32.8  31 - 37 g/dL Final    RDW 01/29/2023 13.9  11.5 - 14.9 % Final    Platelets 42/95/6696 267  150 - 450 k/uL Final    MPV 01/29/2023 7.7  6.0 - 12.0 fL Final    Seg Neutrophils 01/29/2023 54  36 - 66 % Final    Lymphocytes 01/29/2023 31  24 - 44 % Final    Monocytes 01/29/2023 12 (A)  1 - 7 % Final    Eosinophils % 01/29/2023 2  0 - 4 % Final    Basophils 01/29/2023 1  0 - 2 % Final    Segs Absolute 01/29/2023 2.10  1.3 - 9.1 k/uL Final    Absolute Lymph # 01/29/2023 1.20  1.0 - 4.8 k/uL Final    Absolute Mono # 01/29/2023 0.40  0.1 - 1.3 k/uL Final    Absolute Eos # 01/29/2023 0.10  0.0 - 0.4 k/uL Final    Basophils Absolute 01/29/2023 0.00  0.0 - 0.2 k/uL Final    Glucose 01/29/2023 95  70 - 99 mg/dL Final    BUN 01/29/2023 8  6 - 20 mg/dL Final    Creatinine 01/29/2023 0.56 (A)  0.70 - 1.20 mg/dL Final    Est, Glom Filt Rate 01/29/2023 >60  >60 mL/min/1.73m2 Final    Comment:       These results are not intended for use in patients <25years of age. eGFR results are calculated without a race factor using the 2021 CKD-EPI equation. Careful clinical correlation is recommended, particularly when comparing to results   calculated using previous equations.   The CKD-EPI equation is less accurate in patients with extremes of muscle mass, extra-renal   metabolism of creatine, excessive creatine ingestion, or following therapy that affects   renal tubular secretion. Calcium 01/29/2023 8.3 (A)  8.6 - 10.4 mg/dL Final    Sodium 01/29/2023 135  135 - 144 mmol/L Final    Potassium 01/29/2023 3.9  3.7 - 5.3 mmol/L Final    Chloride 01/29/2023 100  98 - 107 mmol/L Final    CO2 01/29/2023 25  20 - 31 mmol/L Final    Anion Gap 01/29/2023 10  9 - 17 mmol/L Final    Alkaline Phosphatase 01/29/2023 57  40 - 129 U/L Final    ALT 01/29/2023 9  5 - 41 U/L Final    AST 01/29/2023 17  <40 U/L Final    Total Bilirubin 01/29/2023 0.3  0.3 - 1.2 mg/dL Final    Total Protein 01/29/2023 6.6  6.4 - 8.3 g/dL Final    Albumin 01/29/2023 3.7  3.5 - 5.2 g/dL Final    Magnesium 01/29/2023 1.9  1.6 - 2.6 mg/dL Final    Lipase 01/29/2023 24  13 - 60 U/L Final    Hemoglobin A1C 01/29/2023 5.7  4.0 - 6.0 % Final    Estimated Avg Glucose 01/29/2023 117  mg/dL Final    Comment: The ADA and AACC recommend providing the estimated average glucose result to permit better   patient understanding of their HBA1c result. Reviewed patient's current plan of care and vital signs with nursing staff.     Labs reviewed: [x] Yes    Medications  Current Facility-Administered Medications: acetaminophen (TYLENOL) tablet 650 mg, 650 mg, Oral, Q4H PRN  aluminum & magnesium hydroxide-simethicone (MAALOX) 200-200-20 MG/5ML suspension 30 mL, 30 mL, Oral, Q6H PRN  nicotine polacrilex (NICORETTE) gum 2 mg, 2 mg, Oral, PRN  polyethylene glycol (GLYCOLAX) packet 17 g, 17 g, Oral, Daily PRN  traZODone (DESYREL) tablet 50 mg, 50 mg, Oral, Nightly PRN  chlorproMAZINE (THORAZINE) tablet 50 mg, 50 mg, Oral, TID PRN **OR** chlorproMAZINE (THORAZINE) injection 50 mg, 50 mg, IntraMUSCular, TID PRN  diphenhydrAMINE (BENADRYL) tablet 50 mg, 50 mg, Oral, Q6H PRN **OR** diphenhydrAMINE (BENADRYL) injection 50 mg, 50 mg, IntraMUSCular, Q6H PRN  sertraline (ZOLOFT) tablet 25 mg, 25 mg, Oral, Daily  metFORMIN (GLUCOPHAGE) tablet 500 mg, 500 mg, Oral, BID WC  cloNIDine (CATAPRES) tablet 0.1 mg, 0.1 mg, Oral, Q4H PRN  cyclobenzaprine (FLEXERIL) tablet 10 mg, 10 mg, Oral, TID PRN    ASSESSMENT  Depression with suicidal ideation         PLAN  Patient symptoms: Showing modest improvement  No Medication Changes Today. Monitor need and frequency of PRN medications. Encourage participation in groups and milieu. Attempt to develop insight. Psycho-education conducted. Probable discharge is Thursday. Follow-up daily while inpatient. Patient continues to be monitored in the inpatient psychiatric facility at [facility] for safety and stabilization. Patient continues to need, on a daily basis, active treatment furnished directly by or requiring the supervision of inpatient psychiatric personnel. Electronically signed by MASSIEL Fischer CNP on 1/31/2023 at 8:17 PM    **This report has been created using voice recognition software. It may contain minor errors which are inherent in voice recognition technology. **  Review

## 2023-02-01 NOTE — BH NOTE
Emergency Medication Follow-Up Note:    PRN medication of Benadryl 50 mg IM was effective as evidence by Patient regain behavioral control, absence of behavior warranting emergency medication, Patient denies medication side effects. Will continue to monitor and provide support as needed.

## 2023-02-01 NOTE — BH NOTE
Emergency PRN Medication Administration Note:      Patient is Agitated as evidence by patient reports increased agitation/anxiety, patient is discharge focused, stating he doesn't need to be here. Staff attempted to find and relieve the distress by Talking to patient, Refocusing on new activity, and Offering suggestions Patient is currently continuing to escalate, accepted PRN medications. Medication Administered as prescribed: Benadryl 50 mg IM Patient Tolerated medication administration. Will continue to monitor, offer support, and reassess.

## 2023-02-01 NOTE — PROGRESS NOTES
Daily Progress Note  2/1/2023    Patient Name: Daniel Villaseñor    CHIEF COMPLAINT: Suicide attempt         SUBJECTIVE:      Patient is seen today for a follow up assessment. When approached for interview patient is focused on discharge and states he is able to maintain safety outside the hospital.  Patient is reporting improvement in depression and denying suicidal ideation. Patient is denying medication side effects and reports Zoloft has been beneficial.  Patient states he plans to continue following up with outpatient care in the community and continue with medication compliance. Plan to discharge patient in the morning pending continued stability overnight. Will reassess tomorrow morning. Appetite:  [x] Adequate/Unchanged  [] Increased  [] Decreased      Sleep:       [x] Adequate/Unchanged  [] Fair  [] Poor      Group Attendance on Unit:   [x] Yes   [] Selectively    [] No    Compliant with scheduled medications: [x] Yes  [] No    Received emergency medications in past 24 hrs: [] Yes   [x] No    Medication Side Effects: Denies         Mental Status Exam  Level of consciousness: Alert and awake. Appearance: Appropriate attire for setting, seated on bed, with fair  grooming and hygiene. Behavior/Motor: Approachable, compliant with interview  Attitude toward examiner: Cooperative, attentive, good eye contact. Speech: normal rate and normal volume   Mood:  Patient reports \" better\". Affect: Congruent  Thought processes: Linear and coherent. Thought content: Denies homicidal ideation. Suicidal Ideation: Denies suicidal ideations  Delusions: No evidence of delusions. Denies paranoia. Perceptual Disturbance: Patient does not appear to be responding to internal stimuli. Denies auditory hallucinations. Denies visual hallucinations. Cognition: Oriented to person, place, time and situation. Memory: Intact. Insight: Fair. Judgement: Fair.        Data   height is 5' 7\" (1.702 m) and weight is 185 lb (83.9 kg). His respiration is 14. Labs:   No visits with results within 2 Day(s) from this visit. Latest known visit with results is:   Admission on 01/29/2023, Discharged on 01/29/2023   Component Date Value Ref Range Status    WBC 01/29/2023 3.8  3.5 - 11.0 k/uL Final    RBC 01/29/2023 4.07 (A)  4.5 - 5.9 m/uL Final    Hemoglobin 01/29/2023 11.9 (A)  13.5 - 17.5 g/dL Final    Hematocrit 01/29/2023 36.3 (A)  41 - 53 % Final    MCV 01/29/2023 89.1  80 - 100 fL Final    MCH 01/29/2023 29.2  26 - 34 pg Final    MCHC 01/29/2023 32.8  31 - 37 g/dL Final    RDW 01/29/2023 13.9  11.5 - 14.9 % Final    Platelets 00/79/8671 267  150 - 450 k/uL Final    MPV 01/29/2023 7.7  6.0 - 12.0 fL Final    Seg Neutrophils 01/29/2023 54  36 - 66 % Final    Lymphocytes 01/29/2023 31  24 - 44 % Final    Monocytes 01/29/2023 12 (A)  1 - 7 % Final    Eosinophils % 01/29/2023 2  0 - 4 % Final    Basophils 01/29/2023 1  0 - 2 % Final    Segs Absolute 01/29/2023 2.10  1.3 - 9.1 k/uL Final    Absolute Lymph # 01/29/2023 1.20  1.0 - 4.8 k/uL Final    Absolute Mono # 01/29/2023 0.40  0.1 - 1.3 k/uL Final    Absolute Eos # 01/29/2023 0.10  0.0 - 0.4 k/uL Final    Basophils Absolute 01/29/2023 0.00  0.0 - 0.2 k/uL Final    Glucose 01/29/2023 95  70 - 99 mg/dL Final    BUN 01/29/2023 8  6 - 20 mg/dL Final    Creatinine 01/29/2023 0.56 (A)  0.70 - 1.20 mg/dL Final    Est, Glom Filt Rate 01/29/2023 >60  >60 mL/min/1.73m2 Final    Comment:       These results are not intended for use in patients <25years of age. eGFR results are calculated without a race factor using the 2021 CKD-EPI equation. Careful clinical correlation is recommended, particularly when comparing to results   calculated using previous equations. The CKD-EPI equation is less accurate in patients with extremes of muscle mass, extra-renal   metabolism of creatine, excessive creatine ingestion, or following therapy that affects   renal tubular secretion.       Calcium 01/29/2023 8.3 (A)  8.6 - 10.4 mg/dL Final    Sodium 01/29/2023 135  135 - 144 mmol/L Final    Potassium 01/29/2023 3.9  3.7 - 5.3 mmol/L Final    Chloride 01/29/2023 100  98 - 107 mmol/L Final    CO2 01/29/2023 25  20 - 31 mmol/L Final    Anion Gap 01/29/2023 10  9 - 17 mmol/L Final    Alkaline Phosphatase 01/29/2023 57  40 - 129 U/L Final    ALT 01/29/2023 9  5 - 41 U/L Final    AST 01/29/2023 17  <40 U/L Final    Total Bilirubin 01/29/2023 0.3  0.3 - 1.2 mg/dL Final    Total Protein 01/29/2023 6.6  6.4 - 8.3 g/dL Final    Albumin 01/29/2023 3.7  3.5 - 5.2 g/dL Final    Magnesium 01/29/2023 1.9  1.6 - 2.6 mg/dL Final    Lipase 01/29/2023 24  13 - 60 U/L Final    Hemoglobin A1C 01/29/2023 5.7  4.0 - 6.0 % Final    Estimated Avg Glucose 01/29/2023 117  mg/dL Final    Comment: The ADA and AACC recommend providing the estimated average glucose result to permit better   patient understanding of their HBA1c result. Reviewed patient's current plan of care and vital signs with nursing staff.     Labs reviewed: [x] Yes    Medications  Current Facility-Administered Medications: acetaminophen (TYLENOL) tablet 650 mg, 650 mg, Oral, Q4H PRN  aluminum & magnesium hydroxide-simethicone (MAALOX) 200-200-20 MG/5ML suspension 30 mL, 30 mL, Oral, Q6H PRN  nicotine polacrilex (NICORETTE) gum 2 mg, 2 mg, Oral, PRN  polyethylene glycol (GLYCOLAX) packet 17 g, 17 g, Oral, Daily PRN  traZODone (DESYREL) tablet 50 mg, 50 mg, Oral, Nightly PRN  chlorproMAZINE (THORAZINE) tablet 50 mg, 50 mg, Oral, TID PRN **OR** chlorproMAZINE (THORAZINE) injection 50 mg, 50 mg, IntraMUSCular, TID PRN  diphenhydrAMINE (BENADRYL) tablet 50 mg, 50 mg, Oral, Q6H PRN **OR** diphenhydrAMINE (BENADRYL) injection 50 mg, 50 mg, IntraMUSCular, Q6H PRN  sertraline (ZOLOFT) tablet 25 mg, 25 mg, Oral, Daily  metFORMIN (GLUCOPHAGE) tablet 500 mg, 500 mg, Oral, BID WC  cloNIDine (CATAPRES) tablet 0.1 mg, 0.1 mg, Oral, Q4H PRN  cyclobenzaprine (FLEXERIL) tablet 10 mg, 10 mg, Oral, TID PRN    ASSESSMENT  Depression with suicidal ideation         PLAN  Patient symptoms: Showing modest improvement  No Medication Changes Today. Monitor need and frequency of PRN medications. Encourage participation in groups and milieu. Attempt to develop insight. Psycho-education conducted. Probable discharge is tomorrow. Follow-up daily while inpatient. Patient continues to be monitored in the inpatient psychiatric facility at [facility] for safety and stabilization. Patient continues to need, on a daily basis, active treatment furnished directly by or requiring the supervision of inpatient psychiatric personnel. Electronically signed by MASSIEL Adair CNP on 2/1/2023 at 4:39 PM    **This report has been created using voice recognition software. It may contain minor errors which are inherent in voice recognition technology. **  Review

## 2023-02-01 NOTE — GROUP NOTE
Group Therapy Note    Date: 2/1/2023    Group Start Time: 1120  Group End Time: 1140  Group Topic: Music Therapy    [facility]    Bertrand Mendez        Group Therapy Note    Attendees: 1/7       Patient's Goal:  Patients offered a group and 1:1 activities, as well as individual leisure resources for free time. Goal of Group:Goals to demonstrate positive use of time; Normalization of the environment; Increase rapport with staff; Increase sense of community    Notes:  Patient requested 1:1 music group. Requested his preferred songs, and expressed listening and singing along to music helps him feel \"calm and peaceful. \" Patient expressed he likes to listen to the same songs over and over again because he doesn't have to think about them and it's calming to him    Status After Intervention:  Improved    Participation Level:  Active Listener and Interactive    Participation Quality: Appropriate, Attentive, and Sharing      Speech:  normal      Thought Process/Content: Logical  Linear      Affective Functioning: Congruent      Mood: euthymic      Level of consciousness:  Alert and Attentive      Response to Learning: Able to verbalize current knowledge/experience and Progressing to goal      Endings: None Reported    Modes of Intervention: Support, Exploration, Activity, and Media      Discipline Responsible: Psychoeducational Specialist      Signature:  Bertrand Mendez

## 2023-02-01 NOTE — GROUP NOTE
Group Therapy Note    Date: 2/1/2023    Group Start Time: 9932  Group End Time: 6636  Group Topic: Music Therapy    TIESHA Carias Lashae        Group Therapy Note    Attendees: 3/6       Patient's Goal:  Relaxation play list group. Tasked patients with naming music and rating it on a scale of 1-10 (1 being relaxing music, 10 being high energy music) and then music would be played in descending order. Patients struggled with this, appeared to be primarily rating music based on how much they liked their music, rating all of their music \"10.\" Instead, patients just requested the music they wanted to hear one at a time, and over course of group, music selections became gradually slower/more calm as observed by slower tempos and lower ranges of dynamic and melodic change. Goals to increase sense of relaxation; Increase self-expression; Increase sense of community; Normalization of the environment;     Notes:  Patient attended first half of group. Requested a song and was singing along. Initially sat with peers and staff but then moved and sat away on slight periphery. Patient left shortly before his song ended expressing he did not feel well and needed to lay down. Patient did have slightly dysphoric affect, with dropped jaw and half closed eyes. Status After Intervention:  Improved    Participation Level:  Active Listener and Interactive    Participation Quality: Appropriate, Attentive, and Sharing      Speech:  normal      Thought Process/Content: Logical  Linear      Affective Functioning: Constricted/Restricted      Mood: dysphoric      Level of consciousness:  Alert and Attentive      Response to Learning: Able to verbalize current knowledge/experience and Progressing to goal      Endings: None Reported    Modes of Intervention: Socialization, Exploration, Activity, and Media      Discipline Responsible: Psychoeducational Specialist      Signature:  Reford Postal

## 2023-02-02 VITALS — RESPIRATION RATE: 14 BRPM | HEIGHT: 67 IN | WEIGHT: 185 LBS | BODY MASS INDEX: 29.03 KG/M2

## 2023-02-02 PROCEDURE — 99239 HOSP IP/OBS DSCHRG MGMT >30: CPT | Performed by: NURSE PRACTITIONER

## 2023-02-02 PROCEDURE — 6370000000 HC RX 637 (ALT 250 FOR IP): Performed by: PSYCHIATRY & NEUROLOGY

## 2023-02-02 PROCEDURE — 6370000000 HC RX 637 (ALT 250 FOR IP): Performed by: INTERNAL MEDICINE

## 2023-02-02 RX ADMIN — SERTRALINE HYDROCHLORIDE 25 MG: 25 TABLET ORAL at 09:00

## 2023-02-02 RX ADMIN — METFORMIN HYDROCHLORIDE 500 MG: 500 TABLET, FILM COATED ORAL at 09:00

## 2023-02-02 RX ADMIN — NICOTINE POLACRILEX 2 MG: 2 GUM, CHEWING BUCCAL at 06:33

## 2023-02-02 NOTE — DISCHARGE INSTRUCTIONS
Information:  Medications:   Medication summary provided   I understand that I should take only the medications on my list.     -why and when I need to take each medicine.     -which side effects to watch for.     -that I should carry my medication information at all times in case of     Emergency situations. I will take all of my medicines to follow up appointments.     -check with my physician or pharmacist before taking any new    Medication, over the counter product or drink alcohol.    -Ask about food, drug or dietary supplement interactions.    -discard old lists and update records with medication providers. Notify Physician:  Notify physician if you notice:   Always call 911 if you feel your life is in danger  In case of an emergency call 911 immediately! If 911 is not available call your local emergency medical system for help    Behavioral Health Follow Up:  Original Referral Source:[facility]  Discharge Diagnosis: Depression with suicidal ideation [F32. A, R45.851]  Recommendations for Level of Care: Take medications as prescribed. Keep all follow up appointments   Patient status at discharge: Clayton Noel   My hospital  was: [address] [facility]  Aftercare plan faxed: Pa Kern   -faxed by: Staff    -date: 2/2/2023   -time: 1400  Prescriptions: Sent with patient     Smoking: Quit Smoking. Call the [employer]'s smoking quitline at 8-413-20N-QUIT  Know the signs of a heart attack   If you have any of the following symptoms call 911 immediately, do not wait more    Than five minutes. 1. Pressure, fullness and/ or squeezing in the center of the chest spreading to    The jaw, neck or shoulder. 2. Chest discomfort with light headedness, fainting, sweating, nausea or    Shortness of breath. 3. Upper abdominal pressure or discomfort. 4. Lower chest pain, back pain, unusual fatigue, shortness of breath, nausea   Or dizziness.      General Information:   Questions regarding your bill: Call HELP program ([phone]     Suicide Hotline (Pepe Flores)  (821) 540-3911      Recovery Help line- 727.717.6596      To obtain results of pending studies call Medical Records at: [phone]     For emergencies and 24 hour/7 days a week contact information:  [phone]        Learning About Coronavirus (COVID-19)  What is coronavirus (COVID-19)? COVID-19 is a disease caused by a type of coronavirus. This illness was first found in December 2019. It has since spread worldwide. Coronaviruses are a large group of viruses. They cause the common cold. They also cause more serious illnesses like [place] East respiratory syndrome (MERS) and severe acute respiratory syndrome (SARS). COVID-19 is caused by a novel coronavirus. That means it's a new type that has not been seen in people before. What are the symptoms? COVID-19 symptoms may include:  Fever. Cough. Trouble breathing. Chills or repeated shaking with chills. Muscle and body aches. Headache. Sore throat. New loss of taste or smell. Vomiting. Diarrhea. In severe cases, COVID-19 can cause pneumonia and make it hard to breathe without help from a machine. It can cause death. How is it diagnosed? COVID-19 is diagnosed with a viral test. This may also be called a PCR test or antigen test. It looks for evidence of the virus in your breathing passages or lungs (respiratory system). The test is most often done on a sample from the nose, throat, or lungs. It's sometimes done on a sample of saliva. One way a sample is collected is by putting a long swab into the back of your nose. If you have questions about COVID-19 testing, ask your doctor or go to cdc.gov to use the COVID-19 Viral Testing Tool. How is it treated? Mild cases of COVID-19 can be treated at home. Serious cases need treatment in the hospital. Treatment may include medicines, plus breathing support such as oxygen therapy or a ventilator.  Some people may be placed on their belly to help their oxygen levels. Treatments that may help people who have COVID-19 include:  Antiviral medicines. These medicines treat viral infections. Immune-based therapy. These medicines help the immune system fight COVID-19. Examples include monoclonal antibodies. Blood thinners. These medicines help prevent blood clots. People with severe illness are at risk for blood clots. How can you protect yourself and others? How can you protect yourself and others from COVID-19? Stay up to date on your COVID-19 vaccines. Avoid sick people, and stay away from others if you are sick. Keep some physical distance between yourself and other people. Avoid crowds, especially indoors. Wear a mask with the best fit, protection, and comfort for you. A mask can help protect you even when others aren't wearing one. Get tested for COVID-19 before you have an indoor visit with people who don't live with you. Improve airflow. If you have to spend time indoors with others, open windows and doors. Or you can use a fan to blow air away from people and out a window. Choose outdoor visits and activities when possible. Cover your mouth with a tissue when you cough or sneeze. Wash your hands often. Avoid touching your mouth, nose, and eyes. Here are some other steps you may need to take. If you were exposed to someone with COVID-19 AND you don't have symptoms:  For at least 10 full days, wear a high-quality mask when you are around other people, even those you live with. Get tested. Do it right away if you develop symptoms. Wait at least 5 days after you were exposed if you don't have symptoms. If your test is positive, call your doctor right away. The doctor may have you take a medicine to keep you from getting seriously ill. Treatment works best when started early. And isolate right away. Take extra care if you have to be around other people who are at high risk of getting seriously ill from COVID-19.  Keep some extra space between yourself and others, for example. And don't travel. Watch for symptoms. If you are sick or test positive for COVID-19:   Talk to your doctor as soon as you can. Your doctor may have you take medicine to help prevent serious illness. Get a COVID-19 test unless you have already been tested. You may need to be tested more than once. Stay home and separate yourself from others, including those you live with. Limit contact with people in your home. If possible, stay in a separate bedroom and use a separate bathroom. For at least 10 full days, anytime you're around other people, you and they should wear a high-quality mask. Children younger than 3years old don't need to wear a mask. Self-isolate until it's safe to be around others again. (Important: Day 0 is the day your symptoms started or the day you tested positive. Day 1 is the day after your symptoms first started or your test was positive.)  If you tested positive but had no symptoms, it's safe to end isolation at the end of Day 5. But if you start to have symptoms, follow the recommendations below and count your first day of symptoms as Day 0. If you have symptoms, when you can end isolation depends on how sick you were and your overall health. No matter what, you need to wait until your symptoms are getting better and you haven't had a fever for 24 hours while not taking medicines to lower the fever. Here's how long to isolate, based on your symptoms:  If you were only a little sick: (This means you might have felt really bad but had no shortness of breath and never needed to be in the hospital.) You can end isolation at the end of Day 5. If you were more sick: (You had some shortness of breath or some trouble breathing but never needed to be in the hospital.) You can end isolation at the end of Day 10. If you were very sick and needed to be in the hospital, or if you have a weakened immune system:  You can end isolation at the end of Day 10 or later. Talk to your doctor to find out when it's safe to end isolation. You may need a viral test.  After you end isolation, if your symptoms come back or get worse: Restart your isolation at Day 0. Do this even if it happens after you took medicine for COVID. Avoid travel and stay away from people at high risk for serious disease for at least 10 days. Check the CDC website at cdc.gov for the most current information on how to protect yourself. How can you self-isolate when you have COVID-19? If you have COVID-19, there are things you can do to help avoid spreading the virus to others. Stay home, and avoid contact with other people. Limit contact with people in your home. If possible, stay in a separate bedroom and use a separate bathroom. Wear a high-quality mask when you are around other people. Improve airflow. If you have to spend time indoors with others, open windows and doors. Or you can use a fan to blow air away from people and out a window. Avoid contact with pets and other animals. Cover your mouth and nose with a tissue when you cough or sneeze. Then throw it in the trash right away. Wash your hands often, especially after you cough or sneeze. Use soap and water, and scrub for at least 20 seconds. If soap and water aren't available, use an alcohol-based hand . Don't share personal household items. These include bedding, towels, cups and glasses, and eating utensils. [address] in the warmest water allowed for the fabric type, and dry it completely. It's okay to wash other people's laundry with yours. Clean and disinfect your home. Use household  and disinfectant wipes or sprays. Go to the [facility] website at cdc.gov if you have questions. When should you call for help? Call 911 anytime you think you may need emergency care. For example, call if you have life-threatening symptoms, such as: You have severe trouble breathing.  (You can't talk at all.)     You have constant chest pain or pressure. You are severely dizzy or lightheaded. You are confused or can't think clearly. You have pale, gray, or blue-colored skin or lips. You pass out (lose consciousness) or are very hard to wake up. You have loss of balance or trouble walking. You have trouble seeing out of one or both eyes. You have weakness or drooping on one side of the face. You have weakness or numbness in an arm or a leg. You have trouble speaking. You have a severe headache. You have a seizure. Call your doctor now or seek immediate medical care if:    You have moderate trouble breathing. (You can't speak a full sentence.)     You are coughing up blood. You have signs of low blood pressure. These include feeling lightheaded; being too weak to stand; and having cold, pale, clammy skin. Watch closely for changes in your health, and be sure to contact your doctor if:    Your symptoms get worse. You are not getting better as expected. You have new or worse symptoms of anxiety, depression, nightmares, or flashbacks. Call before you go to the doctor's office. Follow their instructions. And wear a mask. Where can you learn more? Go to http://www.woods.com/ and enter C008 to learn more about \"Learning About Coronavirus (COVID-19). \"  Current as of: October 28, 2022               Content Version: 13.5  © 5562-1680 [employer]. Care instructions adapted under license by [facility] ([facility]). If you have questions about a medical condition or this instruction, always ask your healthcare professional. [place] [address] any warranty or liability for your use of this information.

## 2023-02-02 NOTE — PROGRESS NOTES
CLINICAL PHARMACY NOTE: MEDS TO BEDS    Total # of Prescriptions Filled: 1   The following medications were delivered to the patient:  Sertraline HCL 25mg    Additional Documentation: Delivered 1 med to nurses station [facility] 11:54 02/02/23 jj

## 2023-02-02 NOTE — GROUP NOTE
Group Therapy Note    Date: 2/2/2023    Group Start Time: 1100  Group End Time: 0047  Group Topic: Music Therapy    [facility]    [place] Sacks        Group Therapy Note    Attendees: 1/1       Patient's Goal:  Decrease anxiety; Increase self-expression; Normalization of the environment    Notes:  Patient requested 1:1 Music group to help reduce anxiety about discharge delay. Patient requested and sang along to music throughout. Brighter affect by end of session. Status After Intervention:  Improved    Participation Level:  Active Listener and Interactive    Participation Quality: Appropriate and Attentive      Speech:  normal      Thought Process/Content: Logical  Linear      Affective Functioning: Congruent      Mood: anxious      Level of consciousness:  Alert and Attentive      Response to Learning: Able to verbalize current knowledge/experience and Progressing to goal      Endings: None Reported    Modes of Intervention: Support, Exploration, Activity, and Media      Discipline Responsible: Psychoeducational Specialist      Signature:  [place] Sacks

## 2023-02-02 NOTE — BH NOTE
[facility]  Discharge Note     Pt belongings: Retrieved from room/safe, reviewed and packed to take with. Dental Appliances: None  Vision - Corrective Lenses: None  Hearing Aid: None  Jewelry: None  Body Piercings Removed: No  Clothing: Undergarments, Shirt, Pants, Belt, Jacket/Coat  Other Valuables: Lighter/Matches, Other (Comment) (visa:[id number]; cyclobenzaprine 10mg)       Patient discharged to home via [place]. Instructed on discharge instructions, pt verbalizes understanding and signs AVS. Pt in control at time of discharge. Pt ambulates to [facility] main entrance with psych staff x2. Rx sent with patient. No complaints voiced at this time. Status EXAM upon discharge:  Mental Status and Behavioral Exam  Normal: No  Level of Assistance: Independent/Self  Facial Expression: Worried  Affect: Blunt  Level of Consciousness: Alert  Frequency of Checks: 4 times per hour, close  Mood:Normal: No  Mood: Anxious  Motor Activity:Normal: No  Motor Activity: Increased  Eye Contact: Good  Observed Behavior: Withdrawn, Preoccupied  Sexual Misconduct History: Current - no  Preception: [place] to person, [place] to time, [place] to place, [place] to situation  Attention:Normal: No  Attention: Distractible  Thought Processes: Circumstantial  Thought Content:Normal: No  Thought Content: Preoccupations  Depression Symptoms: Isolative  Anxiety Symptoms: Generalized  [place] Symptoms: No problems reported or observed.   Hallucinations: None  Delusions: No  Memory:Normal: No  Memory: Confabulation  Insight and Judgment: No  Insight and Judgment: Poor judgment, Poor insight    Brian Stewart LPN

## 2023-02-02 NOTE — GROUP NOTE
Group Therapy Note    Date: 2/2/2023    Group Start Time: 7882  Group End Time: 0913  Group Topic: Daily Inventory Group    [facility]    Tenisha Vela LPN        Group Therapy Note    Attendees: 3/7         Patient's Goal:  to be discharged in time today to go to [facility] Recovery     Notes:      Status After Intervention:  Unchanged    Participation Level: Minimal    Participation Quality: Appropriate      Speech:  normal      Thought Process/Content: Linear      Affective Functioning: Flat      Mood: anxious      Level of consciousness:  Alert      Response to Learning: Progressing to goal      Endings: None Reported    Modes of Intervention: Support      Discipline Responsible: Licensed Practical Nurse      Signature:  Tenisha Vela LPN

## 2023-02-02 NOTE — PLAN OF CARE
Problem: Self Harm/Suicidality  Goal: Will have no self-injury during hospital stay  Description: INTERVENTIONS:  1. Ensure constant observer at bedside with Q15M safety checks  2. Maintain a safe environment  3. Secure patient belongings  4. Ensure family/visitors adhere to safety recommendations  5. Ensure safety tray has been added to patient's diet order  6. Every shift and PRN: Re-assess suicidal risk via Frequent Screener    2/2/2023 0317 by Rupinder Rodríguez RN  Outcome: Progressing  Note: Pt denies thoughts of self harm and is agreeable to seeking out should thoughts of self harm arise. Safe environment maintained. Q15 minute checks for safety cont per unit policy. Will cont to monitor for safety and provides support and reassurance as needed. PT remains aloof of peers and isolative to his room entire shift. PT remains disheveled. PT reports improvement in depression and anxiety but does not rate. PT did take medications for sleep this evening.

## 2023-02-02 NOTE — DISCHARGE SUMMARY
Provider Discharge Summary     Patient ID:  Link Figures  [id number]  82 y.o.  1990    Admit date: 1/30/2023    Discharge date and time: 2/2/2023  1:24 PM     Admitting Physician: Duarte Salazar MD     Discharge Physician: MASSIEL Downing - CNP    Admission Diagnoses: Depression with suicidal ideation [F32. [place]    Discharge Diagnoses:   Depression with suicidal ideation     Patient Active Problem List   Diagnosis Code    Opioid abuse ([facility] [address] F11.10    Noncompliance Z91.199    Rectal bleeding K62.5    Smoker F17.200    Juvenile rheumatoid arthritis ([facility] [address] M08.00    SRIRAM (obstructive sleep apnea) G47.33    Primary insomnia F51.01    Calculus of bile duct with acute on chronic cholecystitis K80.46    Mild intermittent asthma without complication S34.66    Gastritis K29.70    Generalized abdominal pain R10.84    Anemia D64.9    Elevated liver enzymes R74.8    Nausea and vomiting R11.2    Opioid type dependence, continuous (HCC) F11.20    Abdominal pain R10.9    Diarrhea R19.7    Irritable bowel syndrome with both constipation and diarrhea K58.2    Schizoaffective disorder, bipolar type ([address] F25.0    GI bleed K92.2    Depression with suicidal ideation F32. A, R45.851    Schizoaffective disorder ([address] F25.9    MDD (major depressive disorder), recurrent severe, without psychosis ([address] F33.2    Severe episode of recurrent major depressive disorder, without psychotic features ([address] F33.2    Diabetes mellitus type 2 in obese ([address] E11.69, E66.9    Mixed hyperlipidemia E78.2    Cocaine abuse ([address] F14.10    Acute psychosis ([address] F23    PUD (peptic ulcer disease) K27.9    Gastroesophageal reflux disease without esophagitis K21.9    Prediabetes R73.03    Acute respiratory failure with hypoxia ([address] J96.01        Admission Condition: poor    Discharged Condition: stable    Indication for Admission: threat to self    History of Present Illnes (present tense wording is of findings from admission exam and are not necessarily indicative of current findings):   Patient is a 71-year-old male with known history of polysubstance abuse-cocaine and opioids presented from [facility] on a emergency medical significant after he ingested multiple pills of muscle relaxer with an intent to kill self. Patient had recent presentation to our emergency department yesterday following which she was discharged and patient made no comments about depression or suicidal thoughts at that time. Reports that he went to [facility] after he ingested a few pills with an intent to kill self. Denies any recent drug use however his UDS is positive for cocaine. Currently reports withdrawal symptoms as severe tremors and restlessness. Patient does report that he has been feeling increasingly depressed for last several weeks now. Endorses anhedonia. Reports poor appetite. Reports is currently homeless and identifies this as a primary stressor. He denies any recent manic or hypomanic symptoms. Denies any auditory or visual hallucinations today. Could not elicit any other psychotic symptoms today. Please note that patient had some psychotic symptoms in the past, possibly substance-induced. Hospital Course:   Upon admission, Davi Ron was provided a safe secure environment, introduced to unit milieu. Patient participated in groups and individual therapies. Meds were adjusted as noted below. After few days of hospital care, patient began to feel improvement. Depression lifted, thoughts to harm self ceased. Sleep improved, appetite was good. On morning rounds 2/2/2023, Davi Ron endorses feeling ready for discharge. Patient denies suicidal or homicidal ideations, denies hallucinations or delusions. Denies SE's from meds. It was decided that maximum benefit from hospital care had been achieved and patient can be discharged.      Consults:   Internal medicine for medical management    Significant Diagnostic Studies: Routine labs and diagnostics    Treatments: Psychotropic medications, therapy with group, milieu, and 1:1 with nurses, social workers, PAAARTI/CNP, and Attending physician.       Discharge Medications:  Discharge Medication List as of 2/2/2023 12:14 PM        START taking these medications    Details   sertraline (ZOLOFT) 25 MG tablet Take 1 tablet by mouth daily, Disp-30 tablet, R-3Normal           CONTINUE these medications which have NOT CHANGED    Details   metFORMIN (GLUCOPHAGE) 500 MG tablet Take 1 tablet by mouth 2 times daily (with meals) Indications: last dispensed 1/6/23Historical Med      cyclobenzaprine (FLEXERIL) 10 MG tablet Take 10 mg by mouth 3 times daily as needed for Muscle spasmsHistorical Med      traZODone (DESYREL) 50 MG tablet Take 50 mg by mouth nightly as needed for SleepHistorical Med           STOP taking these medications       SUBOXONE 8-2 MG FILM SL film Comments:   Reason for Stopping:         gabapentin (NEURONTIN) 300 MG capsule Comments:   Reason for Stopping:         ketoconazole (NIZORAL) 2 % cream Comments:   Reason for Stopping:         permethrin (ELIMITE) 5 % cream Comments:   Reason for Stopping:         triamcinolone (KENALOG) 0.1 % cream Comments:   Reason for Stopping:         acetaminophen (TYLENOL) 325 MG tablet Comments:   Reason for Stopping:         ALPRAZolam (XANAX) 0.5 MG tablet Comments:   Reason for Stopping:         QUEtiapine (SEROQUEL) 100 MG tablet Comments:   Reason for Stopping:         tiZANidine (ZANAFLEX) 4 MG tablet Comments:   Reason for Stopping:         hydrocortisone 1 % cream Comments:   Reason for Stopping:         cloNIDine (CATAPRES) 0.1 MG tablet Comments:   Reason for Stopping:         Zinc Oxide 6 % CREA Comments:   Reason for Stopping:         ARIPiprazole (ABILIFY) 15 MG tablet Comments:   Reason for Stopping:         cetirizine (ZYRTEC) 10 MG tablet Comments:   Reason for Stopping:         meloxicam (MOBIC) 7.5 MG tablet Comments:   Reason for Stopping: Core Measures statement:   Not applicable    Discharge Exam:  Level of consciousness:  Within normal limits  Appearance: Street clothes, seated, with good grooming  Behavior/Motor: No abnormalities noted  Attitude toward examiner:  Cooperative, attentive, good eye contact  Speech:  spontaneous, normal rate, normal volume and well articulated  Mood:  euthymic  Affect:  Full range  Thought processes:  linear, goal directed and coherent  Thought content:  denies homicidal ideation  Suicidal Ideation:  denies suicidal ideation  Delusions:  no evidence of delusions  Perceptual Disturbance:  denies any perceptual disturbance  Cognition:  Intact  Memory: age appropriate  Insight & Judgement: fair  Medication side effects: denies     Disposition: home    Patient Instructions: Activity: activity as tolerated  1. Patient instructed to take medications regularly and follow up with outpatient appointments. Follow-[facility] scheduled with [facility]       Signed:    Electronically signed by MASSIEL Stover CNP on 2/2/23 at 1:24 PM EST    Time Spent on discharge is more than 30 minutes in the examination, evaluation, counseling and review of medications and discharge plan. An electronic signature was used to authenticate this note. **This report has been created using voice recognition software. It may contain minor errors which are inherent in voice recognition technology. **

## 2023-02-02 NOTE — BH NOTE
Patient given tobacco quitline number [phone] at this time, refusing to call at this time, states \" I just dont want to quit now\"- patient given information as to the dangers of long term tobacco use. Continue to reinforce the importance of tobacco cessation.

## 2023-02-02 NOTE — PLAN OF CARE
Problem: Self Harm/Suicidality  Goal: Will have no self-injury during hospital stay  Description: INTERVENTIONS:  1. Ensure constant observer at bedside with Q15M safety checks  2. Maintain a safe environment  3. Secure patient belongings  4. Ensure family/visitors adhere to safety recommendations  5. Ensure safety tray has been added to patient's diet order  6. Every shift and PRN: Re-assess suicidal risk via Frequent Screener    Outcome: Progressing  Patient denies self harm and suicidal ideations during 1:1 talk time. Q 15 minutes safety checks maintained. Problem: Depression  Goal: Will be euthymic at discharge  Description: INTERVENTIONS:  1. Administer medication as ordered  2. Provide emotional support via 1:1 interaction with staff  3. Encourage involvement in milieu/groups/activities  4. Monitor for social isolation  Outcome: Progressing  Patient denies depression this evening. Q 15 minutes safety checks maintained. Problem: Anxiety  Goal: Will report anxiety at manageable levels  Description: INTERVENTIONS:  1. Administer medication as ordered  2. Teach and rehearse alternative coping skills  3. Provide emotional support with 1:1 interaction with staff  Outcome: Progressing  Patient reported having some anxiety during 1:1 talk time. Q 15 minutes safety checks maintained.

## 2023-02-03 ENCOUNTER — HOSPITAL ENCOUNTER (EMERGENCY)
Age: 33
Discharge: HOME | End: 2023-02-03
Attending: EMERGENCY MEDICINE
Payer: MEDICARE

## 2023-02-03 VITALS
HEART RATE: 113 BPM | RESPIRATION RATE: 14 BRPM | OXYGEN SATURATION: 100 % | SYSTOLIC BLOOD PRESSURE: 137 MMHG | DIASTOLIC BLOOD PRESSURE: 100 MMHG

## 2023-02-03 DIAGNOSIS — L29.9 PRURITIC DISORDER: Primary | ICD-10-CM

## 2023-02-03 PROCEDURE — 99283 EMERGENCY DEPT VISIT LOW MDM: CPT

## 2023-02-03 PROCEDURE — 6370000000 HC RX 637 (ALT 250 FOR IP): Performed by: EMERGENCY MEDICINE

## 2023-02-03 RX ORDER — DIPHENHYDRAMINE HCL 25 MG
25 TABLET ORAL ONCE
Status: COMPLETED | OUTPATIENT
Start: 2023-02-03 | End: 2023-02-03

## 2023-02-03 RX ADMIN — DIPHENHYDRAMINE HCL 25 MG: 25 TABLET ORAL at 02:03

## 2023-02-03 NOTE — ED PROVIDER NOTES
[address] [place] [address] ED  EMERGENCY DEPARTMENT ENCOUNTER      Pt Name: Felicity Abdullahi  MRN: [id number]  [place] 1990  Date of evaluation: 2/3/2023  Provider: Kimber Hutchinson MD    [address] [place] [address]       Chief Complaint   Patient presents with    Abdominal Pain     Pain around his belly button    Nausea         HISTORY OF PRESENT ILLNESS  (Location/Symptom, Timing/Onset, Context/Setting, Quality, Duration, Modifying Factors, Severity.)   Felicity Abdullahi is a 28 y.o. male who presents to the emergency department for itching. This is an ongoing issue for him. He is seen in an emergency department nearly every day for various complaints. He is a poor historian. He cannot tell me when it started. He has been on any new medications or used any new products. Nursing Notes were reviewed.     ALLERGIES     Dicyclomine, Famotidine, Geodon [ziprasidone hcl], Haloperidol, Iv dye [iodides], Reglan [metoclopramide], Ibuprofen, Aspirin, Dicyclomine hcl, Hydroxyzine hcl, Iodine, Metronidazole, Mirtazapine, Ondansetron hcl, Promethazine, and Ziprasidone    CURRENT MEDICATIONS       Previous Medications    CYCLOBENZAPRINE (FLEXERIL) 10 MG TABLET    Take 10 mg by mouth 3 times daily as needed for Muscle spasms    METFORMIN (GLUCOPHAGE) 500 MG TABLET    Take 1 tablet by mouth 2 times daily (with meals) Indications: last dispensed 1/6/23    SERTRALINE (ZOLOFT) 25 MG TABLET    Take 1 tablet by mouth daily    TRAZODONE (DESYREL) 50 MG TABLET    Take 50 mg by mouth nightly as needed for Sleep       PAST MEDICAL HISTORY         Diagnosis Date    Anxiety     Arthritis     Asthma     Bipolar I disorder, most recent episode (or current) depressed, unspecified 9/12/2014    Clostridium difficile infection     COPD (chronic obstructive pulmonary disease) ([facility] [address]     Depression     Disease of blood and blood forming organ     Eczema     Fracture, metacarpal     R 4th and 5th    Gastric ulcer     Gastritis 06/13/2018    GERD (gastroesophageal reflux disease)     GI bleed     H. 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[facility] [address]     Substance abuse ([facility] [address]     Hx of opiates, benzos, cocaine, alcohol abuse    Suicidal ideation     Suicide attempt by hanging ([facility] [address]     Tobacco dependence     Ulcerative colitis ([facility] [address]     UTI (urinary tract infection)        SURGICAL HISTORY           Procedure Laterality Date    ABDOMEN SURGERY      upper GI scope 7/7/2015    BRONCHOSCOPY      CHOLECYSTECTOMY, LAPAROSCOPIC  07/14/2017    surgery performed at [address] [place] [address], COLON, DIAGNOSTIC      OTHER SURGICAL HISTORY      lumbar puncture    [place] COLONOSCOPY W/BIOPSY SINGLE/MULTIPLE  8/9/2017    COLONOSCOPY WITH BIOPSY performed by Randee Mcfarland MD at [facility]    [place] EGD TRANSORAL BIOPSY SINGLE/MULTIPLE N/A 3/20/2017    EGD BIOPSY performed by Alvarez Foreman MD at [facility]    [place] ESOPHAGOGASTRODUODENOSCOPY TRANSORAL DIAGNOSTIC N/A 8/9/2017    EGD ESOPHAGOGASTRODUODENOSCOPY performed by Randee Mcfarland MD at [address] [place] [address] N/A 3/20/2017    SIGMOIDOSCOPY DIAGNOSTIC FLEXIBLE performed by Alvarez Foreman MD at [place] [address]  2/4/16    UPPER GASTROINTESTINAL ENDOSCOPY N/A 6/13/2018    GASTRITIS    UPPER GASTROINTESTINAL ENDOSCOPY N/A 9/20/2018    EGD BIOPSY performed by Jamie Hawkins MD at [facility]           Problem Relation Age of Onset    Diabetes Father     Alcohol Abuse Father     Depression Father     Arthritis Father     High Blood Pressure Father     Other Father         aneurysm & epilepsy Migraines Father     Arthritis Mother     Other Mother         aneurysm & epilepsy    Migraines Mother     Diabetes Brother         Aunt and uncles    Depression Brother     Mental Illness Brother     Other Brother         epilepsy    Migraines Brother     Stroke Other         Uncle    Other Brother         murdered Oct 6th, 2014    Colon Cancer Paternal Cousin 43    Other Sister         epilepsy    Migraines Sister      Family Status   Relation Name Status    Father  Alive    Mother  Alive    Brother  (Not Specified)    Other  (Not Specified)    Brother  (Not Specified)    PCousin  (Not Specified)    Sister  (Not Specified)        SOCIAL HISTORY      reports that he has been smoking cigarettes. He has a 7.50 pack-year smoking history. He has never used smokeless tobacco. He reports that he does not currently use alcohol. He reports current drug use. Drug: Cocaine. REVIEW OF SYSTEMS    (2-9 systems for level 4, 10 or more for level 5)     Review of Systems   Unable to perform ROS: Psychiatric disorder      Except as noted above the remainder of the review of systems was reviewed and negative. PHYSICAL EXAM    (up to 7 for level 4, 8 or more for level 5)     Vitals:    02/03/23 0137   BP: (!) 137/100   Pulse: (!) 113   Resp: 14   SpO2: 100%       Physical Exam  Constitutional:       General: He is not in acute distress. Appearance: He is well-developed. He is not diaphoretic. Comments: He is standing in the room when I walk in. HENT:      Head: Normocephalic and atraumatic. Eyes:      General: No scleral icterus. Right eye: No discharge. Left eye: No discharge. Cardiovascular:      Rate and Rhythm: Normal rate and regular rhythm. Pulmonary:      Effort: Pulmonary effort is normal. No respiratory distress. Breath sounds: Normal breath sounds. No stridor. No wheezing or rales. Abdominal:      General: There is no distension. Palpations: Abdomen is soft. Tenderness: There is no abdominal tenderness. Musculoskeletal:         General: Normal range of motion. Cervical back: Neck supple. Lymphadenopathy:      Cervical: No cervical adenopathy. Skin:     General: Skin is warm and dry. Findings: No erythema or rash. Neurological:      Mental Status: He is alert. Comments: Awake and alert   Psychiatric:      Comments: Today he is cooperative           DIAGNOSTIC RESULTS     EKG: All EKG's are interpreted by the Emergency Department Physician who either signs or Co-signs this chart in the absence of a cardiologist.    Not indicated    RADIOLOGY:   Non-plain film images such as CT, Ultrasound and MRI are read by the radiologist. Plain radiographic images are visualized and preliminarily interpreted by the emergency physician with the below findings:    Not indicated    Interpretation per the Radiologist below, if available at the time of this note:        LABS:  Labs Reviewed - No data to display    All other labs were within normal range or not returned as of this dictation. EMERGENCY DEPARTMENT COURSE and DIFFERENTIAL DIAGNOSIS/MDM:   Vitals:    Vitals:    02/03/23 0137   BP: (!) 137/100   Pulse: (!) 113   Resp: 14   SpO2: 100%       Orders Placed This Encounter   Medications    diphenhydrAMINE (BENADRYL) tablet 25 mg       HEART SCORE: Not applicable    Medical Decision Making: He is given a Benadryl here. Cause uncertain but this is a chronic issue. Evaluation and treatment course in the ED, and plan of care upon discharge was discussed in length with the patient. Patient had no further questions prior to being discharged and was instructed to return to the ED for new or worsening symptoms. DDx include pruritus, malingering      Test considered, but not ordered: None    The patient was involved in his/her plan of care. The testing that was ordered was discussed with the patient.  Any medications that may have been ordered were discussed with the patient. I have reviewed the patient's previous medical records using the electronic health record that we have available. CONSULTS:  None    PROCEDURES:  None    FINAL IMPRESSION      1. Pruritic disorder          DISPOSITION/PLAN   DISPOSITION Decision To Discharge 02/03/2023 02:01:32 AM      PATIENT REFERRED TO:   Carmela Goodwin MD  [facility] [address] [address]  [phone]      As needed    [facility] ED  [address] [facility]  [phone]    If symptoms worsen      DISCHARGE MEDICATIONS:     New Prescriptions    No medications on file       The care is provided during an unprecedented national emergency due to the novel coronavirus, COVID-19.     (Please note that portions of this note were completed with a voice recognition program.  Efforts were made to edit the dictations but occasionally words are mis-transcribed.)    Joel Duarte MD  Attending Emergency Physician           Joel Duarte MD  02/03/23 0819

## 2023-02-18 ENCOUNTER — HOSPITAL ENCOUNTER (EMERGENCY)
Age: 33
Discharge: HOME | End: 2023-02-18
Attending: EMERGENCY MEDICINE
Payer: COMMERCIAL

## 2023-02-18 VITALS
HEIGHT: 67 IN | WEIGHT: 220 LBS | SYSTOLIC BLOOD PRESSURE: 139 MMHG | DIASTOLIC BLOOD PRESSURE: 78 MMHG | BODY MASS INDEX: 34.53 KG/M2 | HEART RATE: 92 BPM | OXYGEN SATURATION: 92 % | RESPIRATION RATE: 16 BRPM | TEMPERATURE: 98.4 F

## 2023-02-18 DIAGNOSIS — L29.9 PRURITUS: Primary | ICD-10-CM

## 2023-02-18 PROCEDURE — 6360000002 HC RX W HCPCS: Performed by: STUDENT IN AN ORGANIZED HEALTH CARE EDUCATION/TRAINING PROGRAM

## 2023-02-18 PROCEDURE — 99284 EMERGENCY DEPT VISIT MOD MDM: CPT

## 2023-02-18 PROCEDURE — 96374 THER/PROPH/DIAG INJ IV PUSH: CPT

## 2023-02-18 RX ORDER — ACETAMINOPHEN 500 MG
1000 TABLET ORAL ONCE
Status: DISCONTINUED | OUTPATIENT
Start: 2023-02-18 | End: 2023-02-18

## 2023-02-18 RX ORDER — DIPHENHYDRAMINE HCL 25 MG
25 TABLET ORAL ONCE
Status: DISCONTINUED | OUTPATIENT
Start: 2023-02-18 | End: 2023-02-18

## 2023-02-18 RX ORDER — DIPHENHYDRAMINE HYDROCHLORIDE 50 MG/ML
25 INJECTION INTRAMUSCULAR; INTRAVENOUS ONCE
Status: COMPLETED | OUTPATIENT
Start: 2023-02-18 | End: 2023-02-18

## 2023-02-18 RX ADMIN — DIPHENHYDRAMINE HYDROCHLORIDE 25 MG: 50 INJECTION, SOLUTION INTRAMUSCULAR; INTRAVENOUS at 07:45

## 2023-02-18 ASSESSMENT — PAIN DESCRIPTION - DESCRIPTORS: DESCRIPTORS: SHARP

## 2023-02-18 ASSESSMENT — PAIN DESCRIPTION - LOCATION: LOCATION: FOOT

## 2023-02-18 ASSESSMENT — PAIN DESCRIPTION - FREQUENCY: FREQUENCY: CONTINUOUS

## 2023-02-18 ASSESSMENT — PAIN SCALES - GENERAL: PAINLEVEL_OUTOF10: 7

## 2023-02-18 ASSESSMENT — PAIN DESCRIPTION - ORIENTATION: ORIENTATION: LEFT;RIGHT

## 2023-02-18 ASSESSMENT — PAIN DESCRIPTION - PAIN TYPE: TYPE: ACUTE PAIN

## 2023-02-18 NOTE — ED NOTES
Patient walking around the department and screaming in the room \"my wife was raped and I can't protect my daughter. \" Patient refusing to take the tylenol and benadryl that was offered by writer as he suddenly starts spitting into the garbage saying that he is going to vomit. Patient requesting to speak with physician. Resident notified.      Diane Frey RN  02/18/23 4878

## 2023-02-18 NOTE — DISCHARGE INSTRUCTIONS
Thank you for visiting [facility] Emergency Department. You need to call Osiris Yadav MD to make an appointment as directed for follow up. Should you have any questions regarding your care or further treatment, please call [facility] Emergency Department at [phone]. You were evaluated for itchiness to your legs bilaterally. You were given an injection to help with the itchiness.   Please follow-up with your primary care physician return to the emergency department if you have new or worsening or different concerns

## 2023-02-18 NOTE — ED PROVIDER NOTES
[address] ED  Emergency Department Encounter  Emergency Medicine Resident     Pt Freddychito Mai Strauss  MRN: [id number]  [place] 1990  Date of evaluation: 2/18/23  PCP:  Wanda Eden MD  Note Started: 7:29 AM EST      CHIEF COMPLAINT       Chief Complaint   Patient presents with    Foot Pain       HISTORY OF PRESENT ILLNESS  (Location/Symptom, Timing/Onset, Context/Setting, Quality, Duration, Modifying Factors, Severity.)      Arian Garcias is a 28 y.o. male who presents with to the emergency department for evaluation of itching in his legs bilaterally. On arrival to his room. Patient was actively smoking and joint. Was jovial and happy however on arriving in the room, patient became emotionally labile, states that I have to help him, states that he has been scratching at his legs so much that he needs a shot of Benadryl and needs some pain control for the amount of pain that are in his legs. After finishing physical evaluation, patient became extremely emotional labile, started screaming about how his wife was murdered and his girlfriend was raped and then murdered and how he had an 6year-old daughter that he cannot protect. After leaving the room, patient continued to holler and scream at staff.     PAST MEDICAL / SURGICAL / SOCIAL / FAMILY HISTORY      has a past medical history of Anxiety, Arthritis, Asthma, Bipolar I disorder, most recent episode (or current) depressed, unspecified, Clostridium difficile infection, COPD (chronic obstructive pulmonary disease) ([address] Depression, Disease of blood and blood forming organ, Eczema, Fracture, metacarpal, Gastric ulcer, Gastritis, GERD (gastroesophageal reflux disease), GI bleed, H. pylori infection, H/O blood clots, Head injury, Headache, Insomnia, Juvenile rheumatoid arthritis ([address] Neuromuscular disorder ([address] PFAPA syndrome ([address] PUD (peptic ulcer disease), Rheumatoid arthritis ([address] Rheumatoid arthritis(714.0), Severe recurrent major depression without psychotic features ([facility] Sleep apnea, Still's disease ([facility] Substance abuse ([facility] Suicidal ideation, Suicide attempt by hanging ([facility] Tobacco dependence, Ulcerative colitis ([facility] and UTI (urinary tract infection). has a past surgical history that includes Colonoscopy; bronchoscopy; other surgical history; Upper gastrointestinal endoscopy (2/4/16); pr egd transoral biopsy single/multiple (N/A, 3/20/2017); sigmoidoscopy (N/A, 3/20/2017); Cholecystectomy, laparoscopic (07/14/2017); pr esophagogastroduodenoscopy transoral diagnostic (N/A, 8/9/2017); pr colonoscopy w/biopsy single/multiple (8/9/2017); Abdomen surgery; Upper gastrointestinal endoscopy (N/A, 6/13/2018); Upper gastrointestinal endoscopy (N/A, 9/20/2018); and Endoscopy, colon, diagnostic. Social History     Socioeconomic History    Marital status: Single     Spouse name: Not on file    Number of children: Not on file    Years of education: Not on file    Highest education level: Not on file   Occupational History    Occupation: disability   Tobacco Use    Smoking status: Every Day     Packs/day: 0.50     Years: 15.00     Pack years: 7.50     Types: Cigarettes    Smokeless tobacco: Never   Vaping Use    Vaping Use: Former   Substance and Sexual Activity    Alcohol use: Not Currently     Comment: drinks daily    Drug use: Yes     Types: Cocaine     Comment: Hx of opiates, benzos, cocaine, alcohol abuse    Sexual activity: Yes     Partners: Female     Comment: Lives alone, not working.    Other Topics Concern    Not on file   Social History Narrative    ** Merged History Encounter **          Social Determinants of Health     Financial Resource Strain: Not on file   Food Insecurity: Not on file   Transportation Needs: Not on file   Physical Activity: Not on file   Stress: Not on file   Social Connections: Not on file   Intimate Partner Violence: Not on file   Housing Stability: Not on file       Family History Problem Relation Age of Onset    Diabetes Father     Alcohol Abuse Father     Depression Father     Arthritis Father     High Blood Pressure Father     Other Father         aneurysm & epilepsy    Migraines Father     Arthritis Mother     Other Mother         aneurysm & epilepsy    Migraines Mother     Diabetes Brother         Aunt and uncles    Depression Brother     Mental Illness Brother     Other Brother         epilepsy    Migraines Brother     Stroke Other         Uncle    Other Brother         murdered Oct 6th, 2014    Colon Cancer Paternal Cousin 43    Other Sister         epilepsy    Migraines Sister        Allergies:  Dicyclomine, Famotidine, Geodon [ziprasidone hcl], Haloperidol, Iv dye [iodides], Reglan [metoclopramide], Ibuprofen, Aspirin, Dicyclomine hcl, Hydroxyzine hcl, Iodine, Metronidazole, Mirtazapine, Ondansetron hcl, Promethazine, and Ziprasidone    Home Medications:  Prior to Admission medications    Medication Sig Start Date End Date Taking? Authorizing Provider   sertraline (ZOLOFT) 25 MG tablet Take 1 tablet by mouth daily 2/2/23   Karel Alexander APRN - CNP   metFORMIN (GLUCOPHAGE) 500 MG tablet Take 1 tablet by mouth 2 times daily (with meals) Indications: last dispensed 1/6/23 1/6/23   Historical Provider, MD   cyclobenzaprine (FLEXERIL) 10 MG tablet Take 10 mg by mouth 3 times daily as needed for Muscle spasms    Historical Provider, MD   traZODone (DESYREL) 50 MG tablet Take 50 mg by mouth nightly as needed for Sleep    Historical Provider, MD   meloxicam (MOBIC) 7.5 MG tablet Take 1 tablet by mouth 2 times daily as needed for Pain 8/19/21 10/30/21  Jose Carlos Tong APRN - CNP         REVIEW OF SYSTEMS       Review of Systems   Constitutional:  Positive for appetite change. Negative for activity change. Cardiovascular:  Negative for chest pain and leg swelling. Psychiatric/Behavioral:  Positive for agitation and behavioral problems.  Negative for sleep disturbance and suicidal ideas. The patient is nervous/anxious. PHYSICAL EXAM      INITIAL VITALS:   /78   Pulse 92   Temp 98.4 °F (36.9 °C)   Resp 16   Ht 5' 7\" (1.702 m)   Wt 220 lb (99.8 kg)   SpO2 92%   BMI 34.46 kg/m²     Physical Exam  Constitutional:       Appearance: He is not ill-appearing. HENT:      Head: Normocephalic and atraumatic. Mouth/Throat:      Mouth: Mucous membranes are moist.      Pharynx: Oropharynx is clear. Eyes:      Extraocular Movements: Extraocular movements intact. Conjunctiva/sclera: Conjunctivae normal.      Pupils: Pupils are equal, round, and reactive to light. Cardiovascular:      Rate and Rhythm: Normal rate and regular rhythm. Musculoskeletal:      Cervical back: Normal range of motion and neck supple. No rigidity. Skin:     Comments: Vertical excoriation marks on the legs bilaterally with 1 small area of ulceration secondary to scratching on the left leg. No overt signs of cellulitis, worsening injury or infection. Neurological:      Mental Status: He is alert. Cranial Nerves: No cranial nerve deficit. Motor: No weakness. DDX/DIAGNOSTIC RESULTS / EMERGENCY DEPARTMENT COURSE / MDM     Medical Decision Making  Patient arriving for evaluation of bilateral leg itchiness in addition with having a small ulceration on the side of his leg secondary to scratching what appears to be a scab. We will treat the patient's itching with Benadryl and pain with Tylenol. Will reevaluate after for improvement. Risk  OTC drugs. EKG      All EKG's are interpreted by the Emergency Department Physician who either signs or Co-signs this chart in the absence of a cardiologist.    EMERGENCY DEPARTMENT COURSE:      ED Course as of 02/18/23 0753   Sat Feb 18, 2023   0734 After leaving the room, patient continued to be emotionally labile, yelling and screaming out at staff and wailing in the room.   Multiple attempts by nursing staff to administer medications the patient's were unsuccessful secondary to the patient stating that he was on 6 satisfied with the medication choices in addition with having new nausea after gagging from coughing himself. Was able to go and speak with social work. States that he is well-known to their service, states that this is patient's typical baseline and that unless there is a mental health concern for suicidal ideation or homicidal ideation the patient can be discharged [ES]   0739 Temp(!): 96.6 °F (35.9 °C)  Temperature probe is inaccurate. Measuring multiple patients at a low temp. Actual temperature recorded is 98.4 [ES]      ED Course User Index  [ES] Ivett Ramirez MD       PROCEDURES:      CONSULTS:  None    CRITICAL CARE:  There was significant risk of life threatening deterioration of patient's condition requiring my direct management. Critical care time 0 minutes, excluding any documented procedures. FINAL IMPRESSION      1.  Pruritus          DISPOSITION / PLAN     DISPOSITION Decision To Discharge 02/18/2023 07:52:00 AM      PATIENT REFERRED TO:  Isaiah Antonio MD  [address] [place] [address]  [phone]    Schedule an appointment as soon as possible for a visit       [facility] ED  [address] [place] [address]  [phone]  Go to   As needed    DISCHARGE MEDICATIONS:  New Prescriptions    No medications on file       Ivett Ramirez MD  Emergency Medicine Resident    (Please note that portions of thisnote were completed with a voice recognition program.  Efforts were made to edit the dictations but occasionally words are mis-transcribed.)       Ivett Ramirez MD  Resident  02/18/23 4854

## 2023-02-18 NOTE — ED PROVIDER NOTES
[facility]     Emergency Department     Faculty Note/ Attestation      Pt Name: Sofia Sloan                                       MRN: [id number]  [place] 1990  Date of evaluation: 2/18/2023    Patients PCP:    Carlin Woodall MD      Attestation  I performed a history and physical examination of the patient and discussed management with the resident. I reviewed the residents note and agree with the documented findings and plan of care. Any areas of disagreement are noted on the chart. I was personally present for the key portions of any procedures. I have documented in the chart those procedures where I was not present during the key portions. I have reviewed the emergency nurses triage note. I agree with the chief complaint, past medical history, past surgical history, allergies, medications, social and family history as documented unless otherwise noted below. For Physician Assistant/ Nurse Practitioner cases/documentation I have personally evaluated this patient and have completed at least one if not all key elements of the E/M (history, physical exam, and MDM). Additional findings are as noted.       Initial Screens:             Vitals:    Vitals:    02/18/23 0636   BP: 139/78   Pulse: 92   Resp: 16   Temp: (!) 96.6 °F (35.9 °C)   TempSrc: Oral   SpO2: 92%   Weight: 220 lb (99.8 kg)   Height: 5' 7\" (1.702 m)       CHIEF COMPLAINT       Chief Complaint   Patient presents with    Foot Pain             DIAGNOSTIC RESULTS             RADIOLOGY:   No orders to display         LABS:  Labs Reviewed - No data to display      EMERGENCY DEPARTMENT COURSE:     -------------------------  BP: 139/78, Temp: (!) 96.6 °F (35.9 °C), Heart Rate: 92, Resp: 16      Comments    Skin itching, small lesion on leg  Will give sx tx and topical ABX    (Please note that portions of this note were completed with a voice recognition program.  Efforts were made to edit the dictations but occasionally words are mis-transcribed.)      Saurabh Doss MD,, MD  Attending Emergency Physician

## 2023-02-19 ENCOUNTER — HOSPITAL ENCOUNTER (EMERGENCY)
Age: 33
Discharge: LEFT BEFORE DISCHARGE | End: 2023-02-19

## 2023-02-19 PROCEDURE — 4500000002 HC ER NO CHARGE

## 2023-02-20 ENCOUNTER — APPOINTMENT (OUTPATIENT)
Dept: GENERAL RADIOLOGY | Age: 33
End: 2023-02-20

## 2023-02-20 ENCOUNTER — APPOINTMENT (OUTPATIENT)
Dept: CT IMAGING | Age: 33
End: 2023-02-20

## 2023-02-20 ENCOUNTER — HOSPITAL ENCOUNTER (EMERGENCY)
Age: 33
Discharge: HOME | End: 2023-02-20
Attending: STUDENT IN AN ORGANIZED HEALTH CARE EDUCATION/TRAINING PROGRAM
Payer: COMMERCIAL

## 2023-02-20 ENCOUNTER — HOSPITAL ENCOUNTER (EMERGENCY)
Age: 33
Discharge: LEFT BEFORE DISCHARGE | End: 2023-02-20
Attending: EMERGENCY MEDICINE

## 2023-02-20 VITALS
RESPIRATION RATE: 18 BRPM | SYSTOLIC BLOOD PRESSURE: 133 MMHG | HEART RATE: 95 BPM | TEMPERATURE: 98 F | OXYGEN SATURATION: 97 % | DIASTOLIC BLOOD PRESSURE: 92 MMHG

## 2023-02-20 VITALS
DIASTOLIC BLOOD PRESSURE: 84 MMHG | TEMPERATURE: 98.1 F | SYSTOLIC BLOOD PRESSURE: 145 MMHG | HEART RATE: 102 BPM | OXYGEN SATURATION: 97 % | RESPIRATION RATE: 16 BRPM

## 2023-02-20 DIAGNOSIS — M79.671 BILATERAL FOOT PAIN: Primary | ICD-10-CM

## 2023-02-20 DIAGNOSIS — Z59.00 HOMELESSNESS: ICD-10-CM

## 2023-02-20 DIAGNOSIS — M79.672 BILATERAL FOOT PAIN: Primary | ICD-10-CM

## 2023-02-20 DIAGNOSIS — T14.8XXA STAB WOUND: Primary | ICD-10-CM

## 2023-02-20 DIAGNOSIS — Z76.5 MALINGERING: ICD-10-CM

## 2023-02-20 LAB
ABO/RH: NORMAL
ANION GAP SERPL CALCULATED.3IONS-SCNC: 11 MMOL/L (ref 9–17)
ANTIBODY SCREEN: NEGATIVE
ARM BAND NUMBER: NORMAL
BLOOD BANK SPECIMEN: ABNORMAL
BUN SERPL-MCNC: 8 MG/DL (ref 6–20)
CARBOXYHEMOGLOBIN: 5 % (ref 0–5)
CHLORIDE SERPL-SCNC: 99 MMOL/L (ref 98–107)
CO2 SERPL-SCNC: 29 MMOL/L (ref 20–31)
CREAT SERPL-MCNC: 0.6 MG/DL (ref 0.7–1.2)
ETHANOL PERCENT: <0.01 %
ETHANOL: <10 MG/DL
EXPIRATION DATE: NORMAL
FIO2: ABNORMAL
GFR SERPL CREATININE-BSD FRML MDRD: 60 ML/MIN/1.73M2
GLUCOSE SERPL-MCNC: 77 MG/DL (ref 70–99)
HCG QUALITATIVE: ABNORMAL
HCO3 VENOUS: 30.7 MMOL/L (ref 24–30)
HCT VFR BLD AUTO: 37.3 % (ref 40.7–50.3)
HGB BLD-MCNC: 12.3 G/DL (ref 13–17)
INR PPP: 0.9
MCH RBC QN AUTO: 29.4 PG (ref 25.2–33.5)
MCHC RBC AUTO-ENTMCNC: 33 G/DL (ref 28.4–34.8)
MCV RBC AUTO: 89 FL (ref 82.6–102.9)
NRBC AUTOMATED: 0 PER 100 WBC
O2 SAT, VEN: 46.3 % (ref 60–85)
PARTIAL THROMBOPLASTIN TIME: 20.2 SEC (ref 20.5–30.5)
PATIENT TEMP: 37
PCO2, VEN: 55.7 MM HG (ref 39–55)
PDW BLD-RTO: 13.2 % (ref 11.8–14.4)
PH VENOUS: 7.36 (ref 7.32–7.42)
PLATELET # BLD AUTO: 247 K/UL (ref 138–453)
PMV BLD AUTO: 10 FL (ref 8.1–13.5)
PO2, VEN: 26.1 MM HG (ref 30–50)
POSITIVE BASE EXCESS, VEN: 4.4 MMOL/L (ref 0–2)
POTASSIUM SERPL-SCNC: 3.6 MMOL/L (ref 3.7–5.3)
PROTHROMBIN TIME: 10.1 SEC (ref 9.1–12.3)
RBC # BLD: 4.19 M/UL (ref 4.21–5.77)
SODIUM SERPL-SCNC: 139 MMOL/L (ref 135–144)
WBC # BLD AUTO: 5.6 K/UL (ref 3.5–11.3)

## 2023-02-20 PROCEDURE — 84520 ASSAY OF UREA NITROGEN: CPT

## 2023-02-20 PROCEDURE — G0480 DRUG TEST DEF 1-7 CLASSES: HCPCS

## 2023-02-20 PROCEDURE — 85730 THROMBOPLASTIN TIME PARTIAL: CPT

## 2023-02-20 PROCEDURE — 6370000000 HC RX 637 (ALT 250 FOR IP): Performed by: STUDENT IN AN ORGANIZED HEALTH CARE EDUCATION/TRAINING PROGRAM

## 2023-02-20 PROCEDURE — 86901 BLOOD TYPING SEROLOGIC RH(D): CPT

## 2023-02-20 PROCEDURE — 85610 PROTHROMBIN TIME: CPT

## 2023-02-20 PROCEDURE — 71045 X-RAY EXAM CHEST 1 VIEW: CPT

## 2023-02-20 PROCEDURE — 99284 EMERGENCY DEPT VISIT MOD MDM: CPT

## 2023-02-20 PROCEDURE — 85027 COMPLETE CBC AUTOMATED: CPT

## 2023-02-20 PROCEDURE — 99283 EMERGENCY DEPT VISIT LOW MDM: CPT

## 2023-02-20 PROCEDURE — 82565 ASSAY OF CREATININE: CPT

## 2023-02-20 PROCEDURE — 82805 BLOOD GASES W/O2 SATURATION: CPT

## 2023-02-20 PROCEDURE — 84703 CHORIONIC GONADOTROPIN ASSAY: CPT

## 2023-02-20 PROCEDURE — 94761 N-INVAS EAR/PLS OXIMETRY MLT: CPT

## 2023-02-20 PROCEDURE — 86900 BLOOD TYPING SEROLOGIC ABO: CPT

## 2023-02-20 PROCEDURE — 82947 ASSAY GLUCOSE BLOOD QUANT: CPT

## 2023-02-20 PROCEDURE — 86850 RBC ANTIBODY SCREEN: CPT

## 2023-02-20 PROCEDURE — 80051 ELECTROLYTE PANEL: CPT

## 2023-02-20 PROCEDURE — 71250 CT THORAX DX C-: CPT

## 2023-02-20 RX ORDER — FENTANYL CITRATE 50 UG/ML
INJECTION, SOLUTION INTRAMUSCULAR; INTRAVENOUS
Status: DISCONTINUED
Start: 2023-02-20 | End: 2023-02-20 | Stop reason: HOSPADM

## 2023-02-20 RX ORDER — ACETAMINOPHEN 500 MG
1000 TABLET ORAL ONCE
Status: COMPLETED | OUTPATIENT
Start: 2023-02-20 | End: 2023-02-20

## 2023-02-20 RX ADMIN — ACETAMINOPHEN 1000 MG: 500 TABLET ORAL at 04:53

## 2023-02-20 SDOH — ECONOMIC STABILITY - HOUSING INSECURITY: HOMELESSNESS UNSPECIFIED: Z59.00

## 2023-02-20 ASSESSMENT — PAIN - FUNCTIONAL ASSESSMENT: PAIN_FUNCTIONAL_ASSESSMENT: 0-10

## 2023-02-20 ASSESSMENT — PAIN DESCRIPTION - LOCATION: LOCATION: FOOT

## 2023-02-20 ASSESSMENT — ENCOUNTER SYMPTOMS
BACK PAIN: 1
GASTROINTESTINAL NEGATIVE: 1
EYES NEGATIVE: 1
SHORTNESS OF BREATH: 1
ALLERGIC/IMMUNOLOGIC NEGATIVE: 1
CHEST TIGHTNESS: 1

## 2023-02-20 ASSESSMENT — PAIN DESCRIPTION - ORIENTATION: ORIENTATION: RIGHT;LEFT

## 2023-02-20 ASSESSMENT — PAIN SCALES - GENERAL
PAINLEVEL_OUTOF10: 10
PAINLEVEL_OUTOF10: 10

## 2023-02-20 NOTE — ED NOTES
Pt in Ed with bilateral foot and leg pain, pt is requesting a break off of his feet.       Shannan Singh RN  02/20/23 8550

## 2023-02-20 NOTE — ED PROVIDER NOTES
[facility]      Pt Name: Sofya Reid  MRN: [id number]  [place] 1990  Date of evaluation: 2/20/23    CHIEF COMPLAINT       Chief Complaint   Patient presents with    Foot Pain     Pt reports feet hurt from walking too much       HISTORY OF PRESENT ILLNESS   Sofya Reid is a 28 y.o. male who presents with bilateral foot pain. Patient is well-known to the emergency department. Homeless. Bipolar.   Today stating initially that he had chest pain but then now is denying this stating has bilateral foot and leg pain from walking all day and is requesting a \"break off his feet\"    PASTMEDICAL HISTORY     Past Medical History:   Diagnosis Date    Anxiety     Arthritis     Asthma     Bipolar I disorder, most recent episode (or current) depressed, unspecified 9/12/2014    Clostridium difficile infection     COPD (chronic obstructive pulmonary disease) ([address]     Depression     Disease of blood and blood forming organ     Eczema     Fracture, metacarpal     R 4th and 5th    Gastric ulcer     Gastritis 06/13/2018    GERD (gastroesophageal reflux disease)     GI bleed     H. 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[address]     Substance abuse ([address]     Hx of opiates, benzos, cocaine, alcohol abuse    Suicidal ideation     Suicide attempt by hanging ([address]     Tobacco dependence     Ulcerative colitis ([address]     UTI (urinary tract infection)      Past Problem List  Patient Active Problem List   Diagnosis Code    Opioid abuse ([address] F11.10    Noncompliance Z91.199    Rectal bleeding K62.5    Smoker F17.200    Juvenile rheumatoid arthritis ([address] M08.00 SRIRAM (obstructive sleep apnea) G47.33    Primary insomnia F51.01    Calculus of bile duct with acute on chronic cholecystitis K80.46    Mild intermittent asthma without complication T51.85    Gastritis K29.70    Generalized abdominal pain R10.84    Anemia D64.9    Elevated liver enzymes R74.8    Nausea and vomiting R11.2    Opioid type dependence, continuous (HCC) F11.20    Abdominal pain R10.9    Diarrhea R19.7    Irritable bowel syndrome with both constipation and diarrhea K58.2    Schizoaffective disorder, bipolar type ([address] F25.0    GI bleed K92.2    Depression with suicidal ideation F32. A, R45.851    Schizoaffective disorder ([address] F25.9    MDD (major depressive disorder), recurrent severe, without psychosis ([address] F33.2    Severe episode of recurrent major depressive disorder, without psychotic features ([address] F33.2    Diabetes mellitus type 2 in obese ([address] E11.69, E66.9    Mixed hyperlipidemia E78.2    Cocaine abuse ([address] F14.10    Acute psychosis ([address] F23    PUD (peptic ulcer disease) K27.9    Gastroesophageal reflux disease without esophagitis K21.9    Prediabetes R73.03    Acute respiratory failure with hypoxia ([address] J96.01       SURGICAL HISTORY       Past Surgical History:   Procedure Laterality Date    ABDOMEN SURGERY      upper GI scope 7/7/2015    BRONCHOSCOPY      CHOLECYSTECTOMY, LAPAROSCOPIC  07/14/2017    surgery performed at [address] [place] [address], COLON, DIAGNOSTIC      OTHER SURGICAL HISTORY      lumbar puncture    PA COLONOSCOPY W/BIOPSY SINGLE/MULTIPLE  8/9/2017    COLONOSCOPY WITH BIOPSY performed by Yue Forman MD at [facility]    PA EGD TRANSORAL BIOPSY SINGLE/MULTIPLE N/A 3/20/2017    EGD BIOPSY performed by Freddy Tapia MD at [facility]    PA ESOPHAGOGASTRODUODENOSCOPY TRANSORAL DIAGNOSTIC N/A 8/9/2017    EGD ESOPHAGOGASTRODUODENOSCOPY performed by Yue Forman MD at [address] [place] [address] N/A 3/20/2017 SIGMOIDOSCOPY DIAGNOSTIC FLEXIBLE performed by Jak Cortez MD at [facility]    UPPER GASTROINTESTINAL ENDOSCOPY  2/4/16    UPPER GASTROINTESTINAL ENDOSCOPY N/A 6/13/2018    GASTRITIS    UPPER GASTROINTESTINAL ENDOSCOPY N/A 9/20/2018    EGD BIOPSY performed by Salbador Jerez MD at [facility]       Previous Medications    CYCLOBENZAPRINE (FLEXERIL) 10 MG TABLET    Take 10 mg by mouth 3 times daily as needed for Muscle spasms    METFORMIN (GLUCOPHAGE) 500 MG TABLET    Take 1 tablet by mouth 2 times daily (with meals) Indications: last dispensed 1/6/23    SERTRALINE (ZOLOFT) 25 MG TABLET    Take 1 tablet by mouth daily    TRAZODONE (DESYREL) 50 MG TABLET    Take 50 mg by mouth nightly as needed for Sleep       ALLERGIES     is allergic to dicyclomine, famotidine, geodon [ziprasidone hcl], haloperidol, iv dye [iodides], reglan [metoclopramide], ibuprofen, aspirin, dicyclomine hcl, hydroxyzine hcl, iodine, metronidazole, mirtazapine, ondansetron hcl, promethazine, and ziprasidone. FAMILY HISTORY     He indicated that his mother is alive. He indicated that his father is alive. He indicated that the status of his sister is unknown. He indicated that the status of his other is unknown. He indicated that the status of his paternal cousin is unknown. SOCIAL HISTORY       Social History     Tobacco Use    Smoking status: Every Day     Packs/day: 0.50     Years: 15.00     Pack years: 7.50     Types: Cigarettes    Smokeless tobacco: Never   Vaping Use    Vaping Use: Former   Substance Use Topics    Alcohol use: Not Currently     Comment: drinks daily    Drug use: Yes     Types: Cocaine     Comment: Hx of opiates, benzos, cocaine, alcohol abuse       PHYSICAL EXAM     INITIAL VITALS: BP (!) 133/92   Pulse 95   Temp 98 °F (36.7 °C) (Oral)   Resp 18   SpO2 97%    Physical Exam  Vitals and nursing note reviewed. Constitutional:       General: He is not in acute distress. Appearance: He is well-developed. He is not toxic-appearing. HENT:      Head: Normocephalic and atraumatic. Nose: Nose normal.      Mouth/Throat:      Mouth: Mucous membranes are moist.   Eyes:      General: No scleral icterus. Conjunctiva/sclera: Conjunctivae normal.      Pupils: Pupils are equal, round, and reactive to light. Cardiovascular:      Rate and Rhythm: Normal rate and regular rhythm. Heart sounds: Normal heart sounds. No murmur heard. No friction rub. No gallop. Pulmonary:      Effort: Pulmonary effort is normal. No respiratory distress. Breath sounds: Normal breath sounds. No wheezing or rales. Musculoskeletal:         General: Normal range of motion. Skin:     General: Skin is warm and dry. Findings: No erythema or rash. Neurological:      Mental Status: He is alert and oriented to person, place, and time. Psychiatric:      Comments: Poor judgment and insight, appearing to have linear thought process with appropriate eye contact, does not appear to be responding to internal stimuli       MEDICAL DECISION MAKING / ED COURSE:   Summary of Patient Presentation:      1)  Number and Complexity of Problems  Problem List This Visit:    1. Bilateral foot pain    2. Homelessness    3.  Malingering        Differential Diagnosis: Malingering, muscle cramps    Diagnoses Considered but Do Not Suspect: Intracranial hemorrhage    Pertinent Comorbid Conditions:    Past Medical History:   Diagnosis Date    Anxiety     Arthritis     Asthma     Bipolar I disorder, most recent episode (or current) depressed, unspecified 9/12/2014    Clostridium difficile infection     COPD (chronic obstructive pulmonary disease) ([address]     Depression     Disease of blood and blood forming organ     Eczema     Fracture, metacarpal     R 4th and 5th    Gastric ulcer     Gastritis 06/13/2018    GERD (gastroesophageal reflux disease)     GI bleed     H. 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HCC)     PUD (peptic ulcer disease)     Rheumatoid arthritis ([facility]     Rheumatoid arthritis(714.0)     Severe recurrent major depression without psychotic features ([facility] 11/21/2021    Sleep apnea     Still's disease ([facility]     Substance abuse ([facility]     Hx of opiates, benzos, cocaine, alcohol abuse    Suicidal ideation     [facility]     Tobacco dependence     Ulcerative colitis ([facility]     UTI (urinary tract infection)        2)  Data Reviewed    External Documents Reviewed: Previous ER visits reviewed by myself  3)  Treatment and Disposition  [Patient repeat assessment, Disposition discussion with patient/family, Case discussed with consulting clinician, MIPS, Social determinants of health impacting treatment or disposition, Shared Decision Making, Code Status Discussion:]    Patient appears improved from previous visits. Does not appears acutely psychotic. Has been calm and cooperative with staff. Given Tylenol for foot pain. Discharge. Based on the low acuity of concerning symptoms and improvement of symptoms, patient will be discharged with follow up and prescription information listed in the Disposition section. Patient states they will follow-up with primary care physician and/or return to the emergency department should they experience a change or worsening of symptoms. Patient will be discharged with resources: summary of visit, instructions, follow-up information, prescriptions if necessary. Patient/ family instructed to read discharge paperwork. All of their questions and concerns were addressed. Suspicion for any acute life-threatening processes is low. Patient voices understanding of plan.         DATA FOR LAB AND RADIOLOGY TESTS ORDERED BELOW ARE REVIEWED BY THE ED CLINICIAN:    RADIOLOGY: All x-rays, CT, MRI, and formal ultrasound images (except ED bedside ultrasound) are read by the radiologist, see reports below, unless otherwise noted in MDM or here. Reports below are reviewed by myself. No orders to display       LABS: Lab orders shown below, the results are reviewed by myself, and all abnormals are listed below. Labs Reviewed - No data to display    Vitals Reviewed:    Vitals:    02/20/23 0428   BP: (!) 133/92   Pulse: 95   Resp: 18   Temp: 98 °F (36.7 °C)   TempSrc: Oral   SpO2: 97%     MEDICATIONS GIVEN TO PATIENT THIS ENCOUNTER:  Orders Placed This Encounter   Medications    acetaminophen (TYLENOL) tablet 1,000 mg     DISCHARGE PRESCRIPTIONS:  New Prescriptions    No medications on file     PHYSICIAN CONSULTS ORDERED THIS ENCOUNTER:  None    ED Course Notes From Epic Narrator:         CRITICAL CARE:   0    PROCEDURES:  none    FINAL IMPRESSION      1. Bilateral foot pain    2. Homelessness    3. Malingering          DISPOSITION/PLAN   DISPOSITION Decision To Discharge 02/20/2023 04:44:37 AM      OUTPATIENT FOLLOW UP THE PATIENT:  No follow-up provider specified.     DISCHARGE MEDICATIONS:  New Prescriptions    No medications on file       Earlene Tyler DO  EmergencyMedicine Attending    (Please note that portions of this note were completed with a voice recognition program.  Efforts were made to edit the dictations but occasionally words are mis-transcribed.)     Earlene Tyler DO  02/20/23 5543
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
## 2023-02-20 NOTE — DISCHARGE INSTRUCTIONS
PLEASE RETURN TO THE EMERGENCY DEPARTMENT IMMEDIATELY for worsening symptoms, white drainage from the wound, redness or streaking, or if you develop any concerning symptoms such as: high fever not relieved by acetaminophen (Tylenol) and/or ibuprofen (Motrin / Advil), chills, shortness of breath, chest pain, feeling of your heart fluttering or racing, persistent nausea and/or vomiting, vomiting up blood, blood in your stool, loss of consciousness, numbness, weakness or tingling in the arms or legs or change in color of the extremities, changes in mental status, persistent headache, blurry vision, loss of bladder / bowel control, unable to follow up with your physician, or other any other care or concern.
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
## 2023-02-21 NOTE — PROGRESS NOTES
Trauma team attempted to perform tertiary exam, however, patient refused. Also refused cleaning/irrigation of his left chest wound despite explanation of the risk of infection.     Wayne Teran, DO  General Surgery PGY-4

## 2023-02-25 ASSESSMENT — ENCOUNTER SYMPTOMS: SHORTNESS OF BREATH: 1

## 2023-02-26 ENCOUNTER — HOSPITAL ENCOUNTER (EMERGENCY)
Age: 33
Discharge: HOME | End: 2023-02-26
Attending: EMERGENCY MEDICINE

## 2023-02-26 VITALS
TEMPERATURE: 96.1 F | RESPIRATION RATE: 16 BRPM | HEART RATE: 92 BPM | OXYGEN SATURATION: 98 % | DIASTOLIC BLOOD PRESSURE: 93 MMHG | SYSTOLIC BLOOD PRESSURE: 142 MMHG

## 2023-02-26 DIAGNOSIS — Z53.21 PATIENT LEFT WITHOUT BEING SEEN: Primary | ICD-10-CM

## 2023-02-26 ASSESSMENT — PAIN - FUNCTIONAL ASSESSMENT: PAIN_FUNCTIONAL_ASSESSMENT: NONE - DENIES PAIN

## 2023-02-26 NOTE — ED PROVIDER NOTES
[facility]     Emergency Department     Faculty Attestation    I performed a history and physical examination of the patient and discussed management with the resident. I have reviewed and agree with the residents findings including all diagnostic interpretations, and treatment plans as written. Any areas of disagreement are noted on the chart. I was personally present for the key portions of any procedures. I have documented in the chart those procedures where I was not present during the key portions. I have reviewed the emergency nurses triage note. I agree with the chief complaint, past medical history, past surgical history, allergies, medications, social and family history as documented unless otherwise noted below. Documentation of the HPI, Physical Exam and Medical Decision Making performed by marlo is based on my personal performance of the HPI, PE and MDM. For Physician Assistant/ Nurse Practitioner cases/documentation I have personally evaluated this patient and have completed at least one if not all key elements of the E/M (history, physical exam, and MDM). Additional findings are as noted. I went to see the patient patient left without being seen, the patient did not wish to be treated, I offered repeatedly to see the patient  Patient appeared to have decision-making capacity, patient left    EKG Interpretation    Interpreted by me      CRITICAL CARE: There was a high probability of clinically significant/life threatening deterioration in this patient's condition which required my urgent intervention. Total critical care time was 0 minutes. This excludes any time for separately reportable procedures.        [place], [place]  02/26/23 2075

## 2023-02-28 ENCOUNTER — HOSPITAL ENCOUNTER (EMERGENCY)
Age: 33
Discharge: HOME | End: 2023-02-28
Attending: EMERGENCY MEDICINE
Payer: COMMERCIAL

## 2023-02-28 VITALS
HEIGHT: 67 IN | DIASTOLIC BLOOD PRESSURE: 92 MMHG | OXYGEN SATURATION: 100 % | SYSTOLIC BLOOD PRESSURE: 154 MMHG | TEMPERATURE: 97.9 F | WEIGHT: 180 LBS | BODY MASS INDEX: 28.25 KG/M2 | HEART RATE: 100 BPM | RESPIRATION RATE: 16 BRPM

## 2023-02-28 DIAGNOSIS — M79.671 BILATERAL FOOT PAIN: Primary | ICD-10-CM

## 2023-02-28 DIAGNOSIS — M79.672 BILATERAL FOOT PAIN: Primary | ICD-10-CM

## 2023-02-28 PROCEDURE — 99283 EMERGENCY DEPT VISIT LOW MDM: CPT

## 2023-02-28 PROCEDURE — 6370000000 HC RX 637 (ALT 250 FOR IP): Performed by: STUDENT IN AN ORGANIZED HEALTH CARE EDUCATION/TRAINING PROGRAM

## 2023-02-28 RX ORDER — ACETAMINOPHEN 500 MG
1000 TABLET ORAL ONCE
Status: COMPLETED | OUTPATIENT
Start: 2023-02-28 | End: 2023-02-28

## 2023-02-28 RX ADMIN — ACETAMINOPHEN 1000 MG: 500 TABLET ORAL at 01:47

## 2023-02-28 ASSESSMENT — PAIN DESCRIPTION - ORIENTATION: ORIENTATION: RIGHT;LEFT

## 2023-02-28 ASSESSMENT — ENCOUNTER SYMPTOMS
NAUSEA: 0
COUGH: 0
SHORTNESS OF BREATH: 0
ABDOMINAL PAIN: 0
BACK PAIN: 0
VOMITING: 0
DIARRHEA: 0
RHINORRHEA: 0
CONSTIPATION: 0

## 2023-02-28 ASSESSMENT — PAIN - FUNCTIONAL ASSESSMENT: PAIN_FUNCTIONAL_ASSESSMENT: 0-10

## 2023-02-28 ASSESSMENT — PAIN SCALES - GENERAL
PAINLEVEL_OUTOF10: 5
PAINLEVEL_OUTOF10: 10

## 2023-02-28 ASSESSMENT — PAIN DESCRIPTION - LOCATION: LOCATION: FOOT

## 2023-02-28 NOTE — ED PROVIDER NOTES
[address] [facility]     Emergency Department     Faculty Attestation        I performed a history and physical examination of the patient and discussed management with the resident. I reviewed the residents note and agree with the documented findings and plan of care. Any areas of disagreement are noted on the chart. I was personally present for the key portions of any procedures. I have documented in the chart those procedures where I was not present during the key portions. I have reviewed the emergency nurses triage note. I agree with the chief complaint, past medical history, past surgical history, allergies, medications, social and family history as documented unless otherwise noted below. For Physician Assistant/ Nurse Practitioner cases/documentation I have personally evaluated this patient and have completed at least one if not all key elements of the E/M (history, physical exam, and MDM). Additional findings are as noted. Vital Signs: BP: (!) 154/92  Heart Rate: 100  Resp: 16  Temp: 97.9 °F (36.6 °C) SpO2: 100 %  PCP:  Sania Graham MD    Pertinent Comments:     Patient is a 26-year-old male relatively well-known to the emergency room with long history of bipolar disease as well as being intermittently homeless. Patient states he has been walking in the rain all day and his feet has become macerated in the bottom and wet. Left foot has slight abrasion on the dorsum but neurovascular intact bilaterally. Assessment/plan: Patient with maceration secondary to chronic wetness over the last day. Will be given dry socks now as well as bacitracin    Critical Care  None      (Please note that portions of this note were completed with a voice recognition program. Efforts were made to edit the dictations but occasionally words are mis-transcribed.  Whenever words are used in this note in any gender, they shall be construed as though they were used in the gender appropriate to the circumstances; and whenever words are used in this note in the singular or plural form, they shall be construed as though they were used in the form appropriate to the circumstances.)    Jose Ybarra MD [facility]  Attending Emergency Medicine Physician            Jose Ybarra MD  02/28/23 0141       Jose Ybarra MD  02/28/23 0147

## 2023-02-28 NOTE — ED NOTES
Pt comes to ED with c/o bilateral foot pain. Pt states pain started after walking too much a couple hours ago. Pt denies taking any medications and vascular problem hx. Pt a/o x4, resting on stretcher, RR even and non labored, call light within reach.       Josefa Cruz RN  02/28/23 1616

## 2023-02-28 NOTE — DISCHARGE INSTRUCTIONS
You were seen in the emergency department today for foot pain. On exam, this is likely related to walking in the rain all day. Do your best to keep your feet dry. Use bacitracin on the abrasions as needed. You may want to loosen your laces as well. Follow-up with your PCP. Return for any new or worsening symptoms. Thank you for visiting [facility] Emergency Department. You need to call Emmett Mayo MD to make an appointment as directed for follow up. Should you have any questions regarding your care or further treatment, please call [facility] Emergency Department at [phone]. Take any medications as prescribed, if given any, otherwise for pain Use ibuprofen or Tylenol (unless prescribed medications that have Tylenol in it). You can take over the counter Ibuprofen (advil) tablets (4 tablets every 8 hours or 3 tablets every 6 hours or 2 tablets every 4 hours)    If given narcotics during this ED visit, please do not drive or operate heavy machinery for at least 4-6 hours. PLEASE RETURN TO THE ED IMMEDIATELY for worsening symptoms, or if you develop any concerning symptoms such as: high fever not relieved by tylenol and/or motrin, chills, shortness of breath, chest pain, persistent nausea and/or vomiting, numbness, weakness or tingling in the arms or legs or change in color of the extremities, changes in mental status, persistent headache, blurry vision, inability to urinate, unable to follow up with your physician, or other any other  Care or concern.

## 2023-02-28 NOTE — ED PROVIDER NOTES
[facility]  ED  Emergency Department Encounter  Emergency Medicine Resident     Pt Chitra Craig Brigette Taylor  MRN: [id number]  Darcigfmohinder 1990  Date of evaluation: 2/28/23  PCP:  Josy Mendoza MD  Note Started: 1:44 AM EST      CHIEF COMPLAINT       Chief Complaint   Patient presents with    Pain     Ankle Pain       HISTORY OF PRESENT ILLNESS  (Location/Symptom, Timing/Onset, Context/Setting, Quality, Duration, Modifying Factors, Severity.)      Toan Sharif is a 28 y.o. male who presents with bilateral foot pain. Patient states he has been walking in the rain all day. He has pain on plantar surface of bilateral feet in addition to the dorsum of the left foot. No obvious injury that he can remember. No ankle or lower leg pain. No other complaints    PAST MEDICAL / SURGICAL / SOCIAL / FAMILY HISTORY      has a past medical history of Anxiety, Arthritis, Asthma, Bipolar I disorder, most recent episode (or current) depressed, unspecified, Clostridium difficile infection, COPD (chronic obstructive pulmonary disease) ([address] Depression, Disease of blood and blood forming organ, Eczema, Fracture, metacarpal, Gastric ulcer, Gastritis, GERD (gastroesophageal reflux disease), GI bleed, H. pylori infection, H/O blood clots, Head injury, Headache, Insomnia, Juvenile rheumatoid arthritis ([address] Neuromuscular disorder (HCC), PFAPA syndrome ([address] PUD (peptic ulcer disease), Rheumatoid arthritis ([address] Rheumatoid arthritis(714.0), Severe recurrent major depression without psychotic features ([address] Sleep apnea, Still's disease ([address] Substance abuse ([address] Suicidal ideation, Suicide attempt by hanging ([address] Tobacco dependence, Ulcerative colitis ([address] and UTI (urinary tract infection). has a past surgical history that includes Colonoscopy; bronchoscopy; other surgical history;  Upper gastrointestinal endoscopy (2/4/16); pr egd transoral biopsy single/multiple (N/A, 3/20/2017); sigmoidoscopy (N/A, 3/20/2017); Cholecystectomy, laparoscopic (07/14/2017); pr esophagogastroduodenoscopy transoral diagnostic (N/A, 8/9/2017); pr colonoscopy w/biopsy single/multiple (8/9/2017); Abdomen surgery; Upper gastrointestinal endoscopy (N/A, 6/13/2018); Upper gastrointestinal endoscopy (N/A, 9/20/2018); and Endoscopy, colon, diagnostic. Social History     Socioeconomic History    Marital status: Single     Spouse name: Not on file    Number of children: Not on file    Years of education: Not on file    Highest education level: Not on file   Occupational History    Occupation: disability   Tobacco Use    Smoking status: Every Day     Packs/day: 0.50     Years: 15.00     Pack years: 7.50     Types: Cigarettes    Smokeless tobacco: Never   Vaping Use    Vaping Use: Former   Substance and Sexual Activity    Alcohol use: Not Currently     Comment: drinks daily    Drug use: Yes     Types: Cocaine     Comment: Hx of opiates, benzos, cocaine, alcohol abuse    Sexual activity: Yes     Partners: Female     Comment: Lives alone, not working.    Other Topics Concern    Not on file   Social History Narrative    ** Merged History Encounter **          Social Determinants of Health     Financial Resource Strain: Not on file   Food Insecurity: Not on file   Transportation Needs: Not on file   Physical Activity: Not on file   Stress: Not on file   Social Connections: Not on file   Intimate Partner Violence: Not on file   Housing Stability: Not on file       Family History   Problem Relation Age of Onset    Diabetes Father     Alcohol Abuse Father     Depression Father     Arthritis Father     High Blood Pressure Father     Other Father         aneurysm & epilepsy    Migraines Father     Arthritis Mother     Other Mother         aneurysm & epilepsy    Migraines Mother     Diabetes Brother         Aunt and uncles    Depression Brother     Mental Illness Brother     Other Brother         epilepsy    Migraines Brother     Stroke Other Uncle    Other Brother         murdered Oct 6th, 2014    Colon Cancer Paternal Cousin 43    Other Sister         epilepsy    Migraines Sister        Allergies:  Dicyclomine, Famotidine, Geodon [ziprasidone hcl], Haloperidol, Iv dye [iodides], Reglan [metoclopramide], Ibuprofen, Aspirin, Dicyclomine hcl, Hydroxyzine hcl, Iodine, Metronidazole, Mirtazapine, Ondansetron hcl, Promethazine, and Ziprasidone    Home Medications:  Prior to Admission medications    Medication Sig Start Date End Date Taking? Authorizing Provider   sertraline (ZOLOFT) 25 MG tablet Take 1 tablet by mouth daily 2/2/23   MASSIEL Melendez CNP   metFORMIN (GLUCOPHAGE) 500 MG tablet Take 1 tablet by mouth 2 times daily (with meals) Indications: last dispensed 1/6/23 1/6/23   Historical Provider, MD   cyclobenzaprine (FLEXERIL) 10 MG tablet Take 10 mg by mouth 3 times daily as needed for Muscle spasms    Historical Provider, MD   traZODone (DESYREL) 50 MG tablet Take 50 mg by mouth nightly as needed for Sleep    Historical Provider, MD   meloxicam (MOBIC) 7.5 MG tablet Take 1 tablet by mouth 2 times daily as needed for Pain 8/19/21 10/30/21  MASSIEL Barone CNP         REVIEW OF SYSTEMS       Review of Systems   Constitutional:  Negative for chills and fever. HENT:  Negative for congestion and rhinorrhea. Eyes:  Negative for visual disturbance. Respiratory:  Negative for cough and shortness of breath. Cardiovascular:  Negative for chest pain. Gastrointestinal:  Negative for abdominal pain, constipation, diarrhea, nausea and vomiting. Genitourinary:  Negative for dysuria and frequency. Musculoskeletal:  Negative for back pain, myalgias and neck pain. Bilateral foot pain   Skin:  Negative for rash. Neurological:  Negative for weakness, numbness and headaches.      PHYSICAL EXAM      INITIAL VITALS:   BP (!) 154/92   Pulse 100   Temp 97.9 °F (36.6 °C) (Oral)   Resp 16   Ht 5' 7\" (1.702 m)   Wt 180 lb (81.6 kg)   SpO2 100%   BMI 28.19 kg/m²     Physical Exam  Constitutional:       General: He is not in acute distress. Appearance: Normal appearance. He is not ill-appearing, toxic-appearing or diaphoretic. HENT:      Head: Normocephalic and atraumatic. Mouth/Throat:      Mouth: Mucous membranes are moist.      Pharynx: Oropharynx is clear. Eyes:      Extraocular Movements: Extraocular movements intact. Cardiovascular:      Rate and Rhythm: Normal rate and regular rhythm. Heart sounds: Normal heart sounds. No murmur heard. Pulmonary:      Effort: Pulmonary effort is normal.      Breath sounds: Normal breath sounds. Musculoskeletal:         General: Normal range of motion. Cervical back: Normal range of motion and neck supple. Comments: Full range of motion of the ankle and feet bilaterally. Skin:     General: Skin is warm and dry. Comments: Slightly macerated plantar surface of bilateral feet. Small abrasion to the dorsum of the left foot. No large wounds. No active bleeding. Neurological:      General: No focal deficit present. Mental Status: He is alert and oriented to person, place, and time. DDX/DIAGNOSTIC RESULTS / EMERGENCY DEPARTMENT COURSE / MDM     Medical Decision Making  80-year-old male presenting with bilateral foot pain after walking in the rain all day. Patient appears well on exam, vitals are stable. He is pain along the inner surface of both feet, with slight maceration of the skin, consistent with walking in wet shoes all day. No significant skin breakdown. Small abrasion to the undersurface of the left foot. Patient requesting Benadryl injection. We discussed that this is not indicated at this time. He was given Tylenol for pain. Given bacitracin for small abrasion and we discussed the importance of keeping the feet dry to prevent this pain.   Patient discharged in stable condition with dry socks    Risk  OTC drugs.        EKG      All EKG's are interpreted by the Emergency Department Physician who either signs or Co-signs this chart in the absence of a cardiologist.    EMERGENCY DEPARTMENT COURSE:           PROCEDURES:      CONSULTS:  None      FINAL IMPRESSION      1. Bilateral foot pain          DISPOSITION / PLAN     DISPOSITION Decision To Discharge 02/28/2023 01:46:00 AM      PATIENT REFERRED TO:  No follow-up provider specified.     DISCHARGE MEDICATIONS:  Discharge Medication List as of 2/28/2023  1:47 AM          Jignesh Caruso DO  Emergency Medicine Resident    (Please note that portions of thisnote were completed with a voice recognition program.  Efforts were made to edit the dictations but occasionally words are mis-transcribed.)        Jignesh Caruso DO  Resident  02/28/23 5098

## 2023-03-04 ENCOUNTER — HOSPITAL ENCOUNTER (EMERGENCY)
Age: 33
Discharge: LEFT BEFORE DISCHARGE | End: 2023-03-04
Attending: STUDENT IN AN ORGANIZED HEALTH CARE EDUCATION/TRAINING PROGRAM

## 2023-03-04 VITALS
SYSTOLIC BLOOD PRESSURE: 127 MMHG | HEIGHT: 67 IN | TEMPERATURE: 98 F | HEART RATE: 111 BPM | OXYGEN SATURATION: 99 % | BODY MASS INDEX: 28.25 KG/M2 | DIASTOLIC BLOOD PRESSURE: 87 MMHG | WEIGHT: 180 LBS | RESPIRATION RATE: 12 BRPM

## 2023-03-04 DIAGNOSIS — Z53.21 PATIENT LEFT WITHOUT BEING SEEN: Primary | ICD-10-CM

## 2023-03-04 ASSESSMENT — PAIN DESCRIPTION - DESCRIPTORS: DESCRIPTORS: ACHING

## 2023-03-04 ASSESSMENT — PAIN - FUNCTIONAL ASSESSMENT: PAIN_FUNCTIONAL_ASSESSMENT: 0-10

## 2023-03-04 ASSESSMENT — PAIN DESCRIPTION - PAIN TYPE: TYPE: ACUTE PAIN

## 2023-03-04 ASSESSMENT — PAIN SCALES - GENERAL: PAINLEVEL_OUTOF10: 8

## 2023-03-04 ASSESSMENT — PAIN DESCRIPTION - ORIENTATION: ORIENTATION: RIGHT;LEFT

## 2023-03-04 ASSESSMENT — PAIN DESCRIPTION - LOCATION: LOCATION: FOOT

## 2023-03-06 ENCOUNTER — HOSPITAL ENCOUNTER (EMERGENCY)
Age: 33
Discharge: HOME | End: 2023-03-06
Attending: EMERGENCY MEDICINE
Payer: COMMERCIAL

## 2023-03-06 VITALS
HEART RATE: 90 BPM | SYSTOLIC BLOOD PRESSURE: 124 MMHG | WEIGHT: 180 LBS | RESPIRATION RATE: 18 BRPM | BODY MASS INDEX: 28.25 KG/M2 | OXYGEN SATURATION: 99 % | TEMPERATURE: 97.2 F | HEIGHT: 67 IN | DIASTOLIC BLOOD PRESSURE: 91 MMHG

## 2023-03-06 DIAGNOSIS — M79.671 PAIN IN BOTH FEET: Primary | ICD-10-CM

## 2023-03-06 DIAGNOSIS — M79.672 PAIN IN BOTH FEET: Primary | ICD-10-CM

## 2023-03-06 PROCEDURE — 99282 EMERGENCY DEPT VISIT SF MDM: CPT

## 2023-03-06 ASSESSMENT — ENCOUNTER SYMPTOMS
TROUBLE SWALLOWING: 0
CHEST TIGHTNESS: 0
SHORTNESS OF BREATH: 0
SORE THROAT: 0
ABDOMINAL PAIN: 0
NAUSEA: 0
WHEEZING: 0
VOMITING: 0

## 2023-03-06 NOTE — ED NOTES
Pt with c/o bilateral foot pain. Pt reports his feet have been hurting since he has been walking around the past several days. Pt denies any injury. Writer asks pt to remove his shoes so writer can assess his feet. Pt states \"I just want to wait until the doctor comes in\". Call light within reach. No needs expressed at this time.       Anuj Xiong RN  03/06/23 3159

## 2023-03-06 NOTE — ED PROVIDER NOTES
[facility]  ED  Emergency Department Encounter  Emergency Medicine Resident     Pt Rosangela Curry  MRN: [id number]  [place] 1990  Date of evaluation: 3/6/23  PCP:  No primary care provider on file. Note Started: 6:26 AM EST      CHIEF COMPLAINT       Chief Complaint   Patient presents with    Foot Pain       HISTORY OF PRESENT ILLNESS  (Location/Symptom, Timing/Onset, Context/Setting, Quality, Duration, Modifying Factors, Severity.)      Kishore Chandra is a 28 y.o. male who presents with bilateral foot pain. Patient states that his feet have been hurting recently. Patient does state that he has been having to walk more frequently and this is causing pain. Patient is denying any other symptoms at this time, he is still able to ambulate. Patient denies fever, chills, nausea, vomiting, chest pain, shortness of breath, any other injuries. Patient has not taken any medication for symptoms. PAST MEDICAL / SURGICAL / SOCIAL / FAMILY HISTORY      has a past medical history of Anxiety, Arthritis, Asthma, Bipolar I disorder, most recent episode (or current) depressed, unspecified, Clostridium difficile infection, COPD (chronic obstructive pulmonary disease) ([address] Depression, Disease of blood and blood forming organ, Eczema, Fracture, metacarpal, Gastric ulcer, Gastritis, GERD (gastroesophageal reflux disease), GI bleed, H. pylori infection, H/O blood clots, Head injury, Headache, Insomnia, Juvenile rheumatoid arthritis ([address] Neuromuscular disorder ([address] PFAPA syndrome ([address] PUD (peptic ulcer disease), Rheumatoid arthritis ([address] Rheumatoid arthritis(714.0), Severe recurrent major depression without psychotic features ([address] Sleep apnea, Still's disease ([address] Substance abuse ([address] Suicidal ideation, Suicide attempt by hanging ([address] Tobacco dependence, Ulcerative colitis ([address] and UTI (urinary tract infection).        has a past surgical history that includes Colonoscopy; bronchoscopy; other surgical history; Upper gastrointestinal endoscopy (2/4/16); pr egd transoral biopsy single/multiple (N/A, 3/20/2017); sigmoidoscopy (N/A, 3/20/2017); Cholecystectomy, laparoscopic (07/14/2017); pr esophagogastroduodenoscopy transoral diagnostic (N/A, 8/9/2017); pr colonoscopy w/biopsy single/multiple (8/9/2017); Abdomen surgery; Upper gastrointestinal endoscopy (N/A, 6/13/2018); Upper gastrointestinal endoscopy (N/A, 9/20/2018); and Endoscopy, colon, diagnostic. Social History     Socioeconomic History    Marital status: Single     Spouse name: Not on file    Number of children: Not on file    Years of education: Not on file    Highest education level: Not on file   Occupational History    Occupation: disability   Tobacco Use    Smoking status: Every Day     Packs/day: 0.50     Years: 15.00     Pack years: 7.50     Types: Cigarettes    Smokeless tobacco: Never   Vaping Use    Vaping Use: Former   Substance and Sexual Activity    Alcohol use: Not Currently     Comment: drinks daily    Drug use: Yes     Types: Cocaine     Comment: Hx of opiates, benzos, cocaine, alcohol abuse    Sexual activity: Yes     Partners: Female     Comment: Lives alone, not working.    Other Topics Concern    Not on file   Social History Narrative    ** Merged History Encounter **          Social Determinants of Health     Financial Resource Strain: Not on file   Food Insecurity: Not on file   Transportation Needs: Not on file   Physical Activity: Not on file   Stress: Not on file   Social Connections: Not on file   Intimate Partner Violence: Not on file   Housing Stability: Not on file       Family History   Problem Relation Age of Onset    Diabetes Father     Alcohol Abuse Father     Depression Father     Arthritis Father     High Blood Pressure Father     Other Father         aneurysm & epilepsy    Migraines Father     Arthritis Mother     Other Mother         aneurysm & epilepsy    Migraines Mother     Diabetes Brother         Aunt and uncles    Depression Brother     Mental Illness Brother     Other Brother         epilepsy    Migraines Brother     Stroke Other         Uncle    Other Brother         murdered Oct 6th, 2014    Colon Cancer Paternal Cousin 43    Other Sister         epilepsy    Migraines Sister        Allergies:  Dicyclomine, Famotidine, Geodon [ziprasidone hcl], Haloperidol, Iv dye [iodides], Reglan [metoclopramide], Ibuprofen, Aspirin, Dicyclomine hcl, Hydroxyzine hcl, Iodine, Metronidazole, Mirtazapine, Ondansetron hcl, Promethazine, and Ziprasidone    Home Medications:  Prior to Admission medications    Medication Sig Start Date End Date Taking? Authorizing Provider   sertraline (ZOLOFT) 25 MG tablet Take 1 tablet by mouth daily 2/2/23   MASSIEL Melendez CNP   metFORMIN (GLUCOPHAGE) 500 MG tablet Take 1 tablet by mouth 2 times daily (with meals) Indications: last dispensed 1/6/23 1/6/23   Historical Provider, MD   cyclobenzaprine (FLEXERIL) 10 MG tablet Take 10 mg by mouth 3 times daily as needed for Muscle spasms    Historical Provider, MD   traZODone (DESYREL) 50 MG tablet Take 50 mg by mouth nightly as needed for Sleep    Historical Provider, MD   meloxicam (MOBIC) 7.5 MG tablet Take 1 tablet by mouth 2 times daily as needed for Pain 8/19/21 10/30/21  MASSIEL Larios CNP         REVIEW OF SYSTEMS       Review of Systems   Constitutional:  Negative for activity change, appetite change, chills, fatigue and fever. HENT:  Negative for congestion, sore throat and trouble swallowing. Respiratory:  Negative for chest tightness, shortness of breath and wheezing. Cardiovascular:  Negative for chest pain. Gastrointestinal:  Negative for abdominal pain, nausea and vomiting. Genitourinary:  Negative for dysuria. Musculoskeletal:         Bilateral feet pain   Neurological:  Negative for weakness, numbness and headaches.      PHYSICAL EXAM      INITIAL VITALS:   BP (!) 124/91 Pulse 90   Temp 97.2 °F (36.2 °C)   Resp 18   Ht 5' 7\" (1.702 m)   Wt 180 lb (81.6 kg)   SpO2 99%   BMI 28.19 kg/m²     Physical Exam  Constitutional:       General: He is not in acute distress. Appearance: Normal appearance. He is not ill-appearing or toxic-appearing. HENT:      Head: Normocephalic and atraumatic. Eyes:      Extraocular Movements: Extraocular movements intact. Pupils: Pupils are equal, round, and reactive to light. Cardiovascular:      Rate and Rhythm: Normal rate and regular rhythm. Pulmonary:      Effort: Pulmonary effort is normal.      Breath sounds: Normal breath sounds. Abdominal:      General: Abdomen is flat. There is no distension. Palpations: Abdomen is soft. Tenderness: There is no abdominal tenderness. Musculoskeletal:      Comments: Feet are without injury, patient has full range of motion of feet with no difficulty  Patient able to ambulate without any difficulty   Neurological:      General: No focal deficit present. Mental Status: He is alert and oriented to person, place, and time. Mental status is at baseline. DDX/DIAGNOSTIC RESULTS / EMERGENCY DEPARTMENT COURSE / MDM     Medical Decision Making  27-year-old male presents emergency department with bilateral foot pain. Patient states that his feet have been hurting more in the past few days. Patient's not taking anything for his symptoms. On physical exam the patient's feet are without injury, no blisters or gangrene appreciated. Patient had full range of motion in bilateral feet, sensation and distal pulses were intact. Patient will be provided with a clean pair of socks and discharged.         EKG      All EKG's are interpreted by the Emergency Department Physician who either signs or Co-signs this chart in the absence of a cardiologist.    EMERGENCY DEPARTMENT COURSE:           PROCEDURES:      CONSULTS:  None    CRITICAL CARE:  There was significant risk of life threatening deterioration of patient's condition requiring my direct management. Critical care time 0 minutes, excluding any documented procedures. FINAL IMPRESSION      1.  Pain in both feet          DISPOSITION / PLAN     DISPOSITION Decision To Discharge 03/06/2023 06:24:48 AM      PATIENT REFERRED TO:  [facility]  [address] [place] [address]  [phone]  Go to   If symptoms worsen    DISCHARGE MEDICATIONS:  Discharge Medication List as of 3/6/2023  6:27 AM          Massimo Ma MD  Emergency Medicine Resident    (Please note that portions of thisnote were completed with a voice recognition program.  Efforts were made to edit the dictations but occasionally words are mis-transcribed.)        Massimo Ma MD  Resident  03/06/23 5587

## 2023-03-06 NOTE — ED PROVIDER NOTES
[facility]   Emergency Department  Faculty Attestation       I performed a history and physical examination of the patient and discussed management with the resident. I reviewed the residents note and agree with the documented findings including all diagnostic interpretations and plan of care. Any areas of disagreement are noted on the chart. I was personally present for the key portions of any procedures. I have documented in the chart those procedures where I was not present during the key portions. I have reviewed the emergency nurses triage note. I agree with the chief complaint, past medical history, past surgical history, allergies, medications, social and family history as documented unless otherwise noted below. Documentation of the HPI, Physical Exam and Medical Decision Making performed by scribmargo is based on my personal performance of the HPI, PE and MDM. For Physician Assistant/ Nurse Practitioner cases/documentation I have personally evaluated this patient and have completed at least one if not all key elements of the E/M (history, physical exam, and MDM). Additional findings are as noted. Pertinent Comments     Primary Care Physician: No primary care provider on file. ED Triage Vitals [03/06/23 0452]   BP Temp Temp src Heart Rate Resp SpO2 Height Weight   (!) 124/91 97.2 °F (36.2 °C) -- 90 18 99 % 5' 7\" (1.702 m) 180 lb (81.6 kg)        This is a 28 y.o. male who presents to the Emergency Department w/ bilateral foot pain. Patient reluctant to answer my questions and very reluctant to participate in exam.  He did state that he just needed to have socks. Feet with no erythema or edema. No wounds. Normal range of motion.     Medical Decision Making  Requesting socks stating that he has pain from walking  No signs of infection or injury  Mi Brandt for d/c     Problems Addressed:  Pain in both feet: acute illness or injury         Critical Care: None     Antelmo Carrero MD  Attending Emergency Physician         Bertha Morgan MD  03/06/23 0051

## 2023-03-06 NOTE — DISCHARGE INSTRUCTIONS
Seen in the emergency department for pain in your feet. We will give you a pair socks. At this time you are stable for discharge. Please return to the emergency department since any new or worsening symptoms including high fever, increased pain or swelling in your feet, difficulty breathing, difficulty walking or any other concerning symptoms. Please follow-up with your primary care provider within a few days discharge.

## 2023-03-11 ENCOUNTER — HOSPITAL ENCOUNTER (EMERGENCY)
Age: 33
Discharge: LEFT BEFORE DISCHARGE | End: 2023-03-11

## 2023-03-13 ENCOUNTER — HOSPITAL ENCOUNTER (EMERGENCY)
Age: 33
Discharge: HOME | End: 2023-03-13
Attending: EMERGENCY MEDICINE
Payer: COMMERCIAL

## 2023-03-13 VITALS
TEMPERATURE: 97.9 F | SYSTOLIC BLOOD PRESSURE: 136 MMHG | OXYGEN SATURATION: 99 % | DIASTOLIC BLOOD PRESSURE: 68 MMHG | RESPIRATION RATE: 16 BRPM | HEART RATE: 108 BPM

## 2023-03-13 DIAGNOSIS — M79.672 FOOT PAIN, BILATERAL: Primary | ICD-10-CM

## 2023-03-13 DIAGNOSIS — M79.671 FOOT PAIN, BILATERAL: Primary | ICD-10-CM

## 2023-03-13 PROCEDURE — 99282 EMERGENCY DEPT VISIT SF MDM: CPT

## 2023-03-13 NOTE — ED PROVIDER NOTES
EMERGENCY DEPARTMENT ENCOUNTER    Pt Name: Avelina Aldana  MRN: [id number]  [place] 1990  Date of evaluation: 3/13/23  CHIEF COMPLAINT       Chief Complaint   Patient presents with    Foot Pain     Bilateral      HISTORY OF PRESENT ILLNESS   The history is provided by the patient. Patient is a 40-year-old male who presents to the ED for foot evaluation. He has generalized pain. No trauma or injuries reported. No other issues at this time. REVIEW OF SYSTEMS     Review of Systems   All other systems reviewed and are negative.   PASTMEDICAL HISTORY     Past Medical History:   Diagnosis Date    Anxiety     Arthritis     Asthma     Bipolar I disorder, most recent episode (or current) depressed, unspecified 9/12/2014    Clostridium difficile infection     COPD (chronic obstructive pulmonary disease) ([address]     Depression     Disease of blood and blood forming organ     Eczema     Fracture, metacarpal     R 4th and 5th    Gastric ulcer     Gastritis 06/13/2018    GERD (gastroesophageal reflux disease)     GI bleed     H. 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[address]     Substance abuse ([address]     Hx of opiates, benzos, cocaine, alcohol abuse    Suicidal ideation     Suicide attempt by hanging ([address]     Tobacco dependence     Ulcerative colitis ([address]     UTI (urinary tract infection)      Past Problem List  Patient Active Problem List   Diagnosis Code    Opioid abuse ([address] F11.10    Noncompliance Z91.199    Rectal bleeding K62.5    Smoker F17.200    Juvenile rheumatoid arthritis ([address] M08.00    SRIRAM (obstructive sleep apnea) G47.33    Primary insomnia F51.01    Calculus of bile duct with acute on chronic cholecystitis K80.46    Mild intermittent asthma without complication H52.24    Gastritis K29.70    Generalized abdominal pain R10.84    Anemia D64.9    Elevated liver enzymes R74.8    Nausea and vomiting R11.2    Opioid type dependence, continuous (formerly [facility]) F11.20    Abdominal pain R10.9    Diarrhea R19.7    Irritable bowel syndrome with both constipation and diarrhea K58.2    Schizoaffective disorder, bipolar type ([address] F25.0    GI bleed K92.2    Depression with suicidal ideation F32. A, R45.851    Schizoaffective disorder (formerly [facility]) F25.9    MDD (major depressive disorder), recurrent severe, without psychosis ([address] F33.2    Severe episode of recurrent major depressive disorder, without psychotic features ([address] F33.2    Diabetes mellitus type 2 in obese ([address] E11.69, E66.9    Mixed hyperlipidemia E78.2    Cocaine abuse ([address] F14.10    Acute psychosis ([address] F23    PUD (peptic ulcer disease) K27.9    Gastroesophageal reflux disease without esophagitis K21.9    Prediabetes R73.03    Acute respiratory failure with hypoxia ([address] J96.01     SURGICAL HISTORY       Past Surgical History:   Procedure Laterality Date    ABDOMEN SURGERY      upper GI scope 7/7/2015    BRONCHOSCOPY      CHOLECYSTECTOMY, LAPAROSCOPIC  07/14/2017    surgery performed at [address] [place] [address], COLON, DIAGNOSTIC      OTHER SURGICAL HISTORY      lumbar puncture    NC COLONOSCOPY W/BIOPSY SINGLE/MULTIPLE  8/9/2017    COLONOSCOPY WITH BIOPSY performed by Humphrey Caceres MD at [facility]    NC EGD TRANSORAL BIOPSY SINGLE/MULTIPLE N/A 3/20/2017    EGD BIOPSY performed by Rafael Edge MD at [facility]    NC ESOPHAGOGASTRODUODENOSCOPY TRANSORAL DIAGNOSTIC N/A 8/9/2017    EGD ESOPHAGOGASTRODUODENOSCOPY performed by Humphrey Caceres MD at [address] [place] [address] N/A 3/20/2017    SIGMOIDOSCOPY DIAGNOSTIC FLEXIBLE performed by Rafael Edge MD at [facility] UPPER GASTROINTESTINAL ENDOSCOPY  2/4/16    UPPER GASTROINTESTINAL ENDOSCOPY N/A 6/13/2018    GASTRITIS    UPPER GASTROINTESTINAL ENDOSCOPY N/A 9/20/2018    EGD BIOPSY performed by Srinivasan Silver MD at [address] [place] [address]       Previous Medications    CYCLOBENZAPRINE (FLEXERIL) 10 MG TABLET    Take 10 mg by mouth 3 times daily as needed for Muscle spasms    METFORMIN (GLUCOPHAGE) 500 MG TABLET    Take 1 tablet by mouth 2 times daily (with meals) Indications: last dispensed 1/6/23    SERTRALINE (ZOLOFT) 25 MG TABLET    Take 1 tablet by mouth daily    TRAZODONE (DESYREL) 50 MG TABLET    Take 50 mg by mouth nightly as needed for Sleep     ALLERGIES     is allergic to dicyclomine, famotidine, geodon [ziprasidone hcl], haloperidol, iv dye [iodides], reglan [metoclopramide], ibuprofen, aspirin, dicyclomine hcl, hydroxyzine hcl, iodine, metronidazole, mirtazapine, ondansetron hcl, promethazine, and ziprasidone. FAMILY HISTORY     He indicated that his mother is alive. He indicated that his father is alive. He indicated that the status of his sister is unknown. He indicated that the status of his other is unknown. He indicated that the status of his paternal cousin is unknown. SOCIAL HISTORY       Social History     Tobacco Use    Smoking status: Every Day     Packs/day: 0.50     Years: 15.00     Pack years: 7.50     Types: Cigarettes    Smokeless tobacco: Never   Vaping Use    Vaping Use: Former   Substance Use Topics    Alcohol use: Not Currently     Comment: drinks daily    Drug use: Yes     Types: Cocaine     Comment: Hx of opiates, benzos, cocaine, alcohol abuse     PHYSICAL EXAM     INITIAL VITALS: /68   Pulse (!) 108   Temp 97.9 °F (36.6 °C)   Resp 16   SpO2 99%    Physical Exam  Constitutional:       General: He is sleeping. Appearance: Normal appearance. Comments: Sleeping, looking appropriately dressed for outside weather. HENT:      Head: Normocephalic.       Right Ear: External ear normal.      Left Ear: External ear normal.      Nose: Nose normal.   Eyes:      Conjunctiva/sclera: Conjunctivae normal.   Cardiovascular:      Rate and Rhythm: Normal rate. Pulmonary:      Effort: Pulmonary effort is normal.   Abdominal:      General: Abdomen is flat. Musculoskeletal:      Cervical back: No muscular tenderness. Feet:      Right foot:      Skin integrity: Erythema and dry skin present. Left foot:      Skin integrity: Erythema and dry skin present. Skin:     General: Skin is dry. Neurological:      Mental Status: He is easily aroused. Mental status is at baseline. Psychiatric:         Mood and Affect: Mood normal.         Behavior: Behavior normal.       MEDICAL DECISION MAKING / ED COURSE:   Summary of Patient Presentation: Temperature 97.9, pulse 108, blood pressure 136/68. Exam notable for generalized erythema toes bilaterally. No blisters. No open sores. Strong DP pulses bilaterally. 1)  Number and Complexity of Problems  Problem List This Visit: Foot pain. Differential Diagnosis: Normal wear and tear from living outside and walking on feet all day long. He does not appear to have a pair of socks to wear. Diagnoses Considered but Do Not Suspect: Cellulitis, deep space infection. Pertinent Comorbid Conditions: Anxiety, bipolar, COPD, depression, gastritis, GERD, headache, substance abuse. 2)  Data Reviewed  Patient's EKG independently interpreted by me: N/A    Decision Rules/Scores utilized:  N/A    HEART SCORE: N/A, no chest pain. NIH STROKE SCALE: N/A, no focal neurodeficits. External Documents Reviewed: I have independently reviewed patient's previous medical records including labs, notes and imaging. Tests considered but not ordered and why:  N/A    Imaging that is independently reviewed and interpreted by me as:  N/A    See more data below for the lab and radiology tests and orders.     3)  Treatment and Disposition    Patient repeat assessment: Stable, unchanged. Disposition discussion with patient/family: Patient aware and agrees with disposition plan. Case discussed with consulting clinician:  N/A    MIPS:  N/A    Social determinants of health impacting treatment or disposition:  None    Shared Decision Making completed with patient regarding risks and benefits of admission versus discharge. Patient decides to be discharged home. Code Status Discussion:  Not discussed    \"ED Course\" Notes From Epic Narrator:     Patient did well in the ED. Given fresh pair of socks and extra pair of the road. All questions answered. Given strict return precautions. No further work-up indicated this time. CRITICAL CARE:   N/A    PROCEDURES:  N/A      DATA FOR LAB AND RADIOLOGY TESTS ORDERED BELOW ARE REVIEWED BY THE ED CLINICIAN:    RADIOLOGY: All x-rays, CT, MRI, and formal ultrasound images (except ED bedside ultrasound) are read by the radiologist, see reports below, unless otherwise noted in MDM or here. Reports below are reviewed by myself. No orders to display       LABS: Lab orders shown below, the results are reviewed by myself, and all abnormals are listed below. Labs Reviewed - No data to display    Vitals Reviewed:    Vitals:    03/13/23 0227   BP: 136/68   Pulse: (!) 108   Resp: 16   Temp: 97.9 °F (36.6 °C)   SpO2: 99%     MEDICATIONS GIVEN TO PATIENT THIS ENCOUNTER:  No orders of the defined types were placed in this encounter. DISCHARGE PRESCRIPTIONS:  New Prescriptions    No medications on file     PHYSICIAN CONSULTS ORDERED THIS ENCOUNTER:  None  FINAL IMPRESSION      1. Foot pain, bilateral          DISPOSITION/PLAN   DISPOSITION Decision To Discharge 03/13/2023 03:44:17 AM      OUTPATIENT FOLLOW UP THE PATIENT:  No follow-up provider specified.     MD Rosalina Roberts MD  03/13/23 5818

## 2023-03-13 NOTE — ED NOTES
Pt reports to ED with concern of frost bite to feet. Pt also reports bilateral chronic foot pain. Pt reports he would like to be able to rest his feet.       Chavez Malik RN  03/13/23 3980

## 2023-03-22 ENCOUNTER — HOSPITAL ENCOUNTER (EMERGENCY)
Age: 33
Discharge: LEFT BEFORE DISCHARGE | End: 2023-03-22
Attending: EMERGENCY MEDICINE

## 2023-03-29 NOTE — ED NOTES
Discharge was talked about with pt and pt left the room before taking the discharge paperwork. Pt was seen walking in the hallway by the lobby and ambulated without complication.       Lenny Meehan RN  03/13/18 0135 Identified Patient with two Patient identifiers (Name and ). Two Patient Identifiers confirmed. Reviewed record in preparation for visit and have obtained necessary documentation. Chief Complaint   Patient presents with    Hip Pain     Patient with right hip and right knee pain. Patient has been limping because of a foot issue. This has started the pain in hip. Patient with chronic right knee pain and has had injections for this before. No known trauma. Currently pain is 3/10. Visit Vitals  /74 (BP 1 Location: Right arm, BP Patient Position: Sitting, BP Cuff Size: Adult)   Pulse 73   Temp 98.1 °F (36.7 °C) (Oral)   Resp 18   Ht 5' 8\" (1.727 m)   Wt 198 lb (89.8 kg)   SpO2 97%   BMI 30.11 kg/m²       1. Have you been to the ER, urgent care clinic since your last visit? Hospitalized since your last visit? No    2. Have you seen or consulted any other health care providers outside of the [address] [place] [address] since your last visit? Include any pap smears or colon screening.  No

## 2023-04-23 ENCOUNTER — HOSPITAL ENCOUNTER (EMERGENCY)
Age: 33
Discharge: HOME | End: 2023-04-24
Attending: STUDENT IN AN ORGANIZED HEALTH CARE EDUCATION/TRAINING PROGRAM
Payer: COMMERCIAL

## 2023-04-23 VITALS
WEIGHT: 200 LBS | SYSTOLIC BLOOD PRESSURE: 154 MMHG | OXYGEN SATURATION: 99 % | HEIGHT: 67 IN | HEART RATE: 100 BPM | BODY MASS INDEX: 31.39 KG/M2 | DIASTOLIC BLOOD PRESSURE: 103 MMHG | TEMPERATURE: 98.4 F | RESPIRATION RATE: 20 BRPM

## 2023-04-23 DIAGNOSIS — J06.9 UPPER RESPIRATORY TRACT INFECTION, UNSPECIFIED TYPE: ICD-10-CM

## 2023-04-23 DIAGNOSIS — R10.13 EPIGASTRIC PAIN: Primary | ICD-10-CM

## 2023-04-23 PROCEDURE — 99283 EMERGENCY DEPT VISIT LOW MDM: CPT

## 2023-04-23 ASSESSMENT — PAIN SCALES - GENERAL: PAINLEVEL_OUTOF10: 9

## 2023-04-23 ASSESSMENT — PAIN - FUNCTIONAL ASSESSMENT: PAIN_FUNCTIONAL_ASSESSMENT: 0-10

## 2023-04-24 PROCEDURE — 6370000000 HC RX 637 (ALT 250 FOR IP): Performed by: STUDENT IN AN ORGANIZED HEALTH CARE EDUCATION/TRAINING PROGRAM

## 2023-04-24 RX ORDER — ACETAMINOPHEN 500 MG
1000 TABLET ORAL ONCE
Status: COMPLETED | OUTPATIENT
Start: 2023-04-24 | End: 2023-04-24

## 2023-04-24 RX ADMIN — ACETAMINOPHEN 1000 MG: 500 TABLET ORAL at 00:51

## 2023-04-24 NOTE — ED PROVIDER NOTES
distress. Appearance: He is well-developed. He is not toxic-appearing. HENT:      Head: Normocephalic and atraumatic. Nose: Nose normal.      Mouth/Throat:      Mouth: Mucous membranes are moist.   Eyes:      General: No scleral icterus. Conjunctiva/sclera: Conjunctivae normal.      Pupils: Pupils are equal, round, and reactive to light. Cardiovascular:      Rate and Rhythm: Normal rate and regular rhythm. Heart sounds: Normal heart sounds. No murmur heard. No friction rub. No gallop. Pulmonary:      Effort: Pulmonary effort is normal. No respiratory distress. Breath sounds: Normal breath sounds. No wheezing or rales. Abdominal:      Palpations: Abdomen is soft. Comments: Mild tenderness tenderness palpation no guarding mass no rebound   Musculoskeletal:         General: Normal range of motion. Skin:     General: Skin is warm and dry. Findings: No erythema or rash. Neurological:      Mental Status: He is alert and oriented to person, place, and time. Psychiatric:         Behavior: Behavior normal.       MEDICAL DECISION MAKING / ED COURSE:   Summary of Patient Presentation:      1)  Number and Complexity of Problems  Problem List This Visit:    1. Epigastric pain    2.  Upper respiratory tract infection, unspecified type        Differential Diagnosis: Gastritis versus malingering     Diagnoses Considered but Do Not Suspect: Surgical abdomen    Pertinent Comorbid Conditions:    Past Medical History:   Diagnosis Date    Anxiety     Arthritis     Asthma     Bipolar I disorder, most recent episode (or current) depressed, unspecified 9/12/2014    Clostridium difficile infection     COPD (chronic obstructive pulmonary disease) ([facility]     Depression     Disease of blood and blood forming organ     Eczema     Fracture, metacarpal     R 4th and 5th    Gastric ulcer     Gastritis 06/13/2018    GERD (gastroesophageal reflux disease)     GI bleed     H. pylori infection     H/O

## 2023-04-24 NOTE — DISCHARGE INSTRUCTIONS
PLEASE RETURN TO THE EMERGENCY DEPARTMENT IMMEDIATELY for worsening symptoms, difficulty with swallowing foods or liquids, shortness of breath, wheezing, change in your voice, or if you develop any concerning symptoms such as: high fever not relieved by acetaminophen (Tylenol) and/or ibuprofen (Motrin / Advil), chills, shortness of breath, chest pain, feeling of your heart fluttering or racing, persistent nausea and/or vomiting, vomiting up blood, blood in your stool, loss of consciousness, numbness, weakness or tingling in the arms or legs or change in color of the extremities, changes in mental status, persistent headache, blurry vision, loss of bladder / bowel control, unable to follow up with your physician, or other any other care or concern.

## 2023-04-25 ENCOUNTER — HOSPITAL ENCOUNTER (EMERGENCY)
Age: 33
Discharge: HOME | End: 2023-04-25
Attending: EMERGENCY MEDICINE
Payer: COMMERCIAL

## 2023-04-25 VITALS
SYSTOLIC BLOOD PRESSURE: 131 MMHG | DIASTOLIC BLOOD PRESSURE: 72 MMHG | BODY MASS INDEX: 31.39 KG/M2 | HEART RATE: 89 BPM | TEMPERATURE: 98.3 F | HEIGHT: 67 IN | OXYGEN SATURATION: 97 % | RESPIRATION RATE: 16 BRPM | WEIGHT: 200 LBS

## 2023-04-25 DIAGNOSIS — M79.672 LEFT FOOT PAIN: Primary | ICD-10-CM

## 2023-04-25 PROCEDURE — 99283 EMERGENCY DEPT VISIT LOW MDM: CPT

## 2023-04-25 PROCEDURE — 6370000000 HC RX 637 (ALT 250 FOR IP): Performed by: EMERGENCY MEDICINE

## 2023-04-25 RX ORDER — ACETAMINOPHEN 325 MG/1
650 TABLET ORAL ONCE
Status: COMPLETED | OUTPATIENT
Start: 2023-04-25 | End: 2023-04-25

## 2023-04-25 RX ADMIN — ACETAMINOPHEN 650 MG: 325 TABLET ORAL at 04:25

## 2023-04-25 ASSESSMENT — PAIN SCALES - GENERAL: PAINLEVEL_OUTOF10: 3

## 2023-04-25 NOTE — ED NOTES
When going to get pt to triage. Pt is not in lobby and went back outside per registration.       Danie Colón RN  04/25/23 8306

## 2023-04-25 NOTE — ED NOTES
Pt presents to ED via private auto with c/o foot swelling. Pt able to ambulate without assist. Pt afebrile, vitals stable. No swelling noted to feet.      Harley Palma RN  04/25/23 0022

## 2023-04-25 NOTE — DISCHARGE INSTRUCTIONS
Make sure to wear clean dry socks with supportive shoes prolonged standing can cause irritation and discomfort to the foot.

## 2023-04-25 NOTE — ED PROVIDER NOTES
EMERGENCY DEPARTMENT ENCOUNTER    Pt Name: Shanthi Jamison  MRN: [id number]  [place] 1990  Date of evaluation: 4/25/23  CHIEF COMPLAINT       Chief Complaint   Patient presents with    Foot Swelling     HISTORY OF PRESENT ILLNESS   26-year-old male presents emergency room complaining of pain and discomfort to his left toe. Patient reports pain to the bottom of the toe. No report of injury. Patient is poor historian secondary to underlying psychiatric disease.            REVIEW OF SYSTEMS     Review of Systems   Unable to perform ROS: Psychiatric disorder   PASTMEDICAL HISTORY     Past Medical History:   Diagnosis Date    Anxiety     Arthritis     Asthma     Bipolar I disorder, most recent episode (or current) depressed, unspecified 9/12/2014    Clostridium difficile infection     COPD (chronic obstructive pulmonary disease) ([address]     Depression     Disease of blood and blood forming organ     Eczema     Fracture, metacarpal     R 4th and 5th    Gastric ulcer     Gastritis 06/13/2018    GERD (gastroesophageal reflux disease)     GI bleed     H. 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
none required

## 2023-04-27 ENCOUNTER — HOSPITAL ENCOUNTER (EMERGENCY)
Age: 33
Discharge: HOME | End: 2023-04-27
Attending: EMERGENCY MEDICINE
Payer: COMMERCIAL

## 2023-04-27 ENCOUNTER — HOSPITAL ENCOUNTER (EMERGENCY)
Age: 33
Discharge: LEFT BEFORE DISCHARGE | End: 2023-04-28
Attending: EMERGENCY MEDICINE
Payer: COMMERCIAL

## 2023-04-27 VITALS
TEMPERATURE: 98.3 F | SYSTOLIC BLOOD PRESSURE: 166 MMHG | OXYGEN SATURATION: 96 % | RESPIRATION RATE: 16 BRPM | DIASTOLIC BLOOD PRESSURE: 107 MMHG | HEART RATE: 107 BPM

## 2023-04-27 VITALS
BODY MASS INDEX: 31.39 KG/M2 | TEMPERATURE: 98.2 F | SYSTOLIC BLOOD PRESSURE: 138 MMHG | RESPIRATION RATE: 18 BRPM | OXYGEN SATURATION: 100 % | DIASTOLIC BLOOD PRESSURE: 82 MMHG | HEIGHT: 67 IN | HEART RATE: 82 BPM | WEIGHT: 200 LBS

## 2023-04-27 DIAGNOSIS — Z53.21 ELOPED FROM EMERGENCY DEPARTMENT: Primary | ICD-10-CM

## 2023-04-27 DIAGNOSIS — M79.675 CHRONIC PAIN OF TOE OF LEFT FOOT: Primary | ICD-10-CM

## 2023-04-27 DIAGNOSIS — M79.672 LEFT FOOT PAIN: Primary | ICD-10-CM

## 2023-04-27 DIAGNOSIS — G89.29 CHRONIC PAIN OF TOE OF LEFT FOOT: Primary | ICD-10-CM

## 2023-04-27 PROCEDURE — 99282 EMERGENCY DEPT VISIT SF MDM: CPT

## 2023-04-27 PROCEDURE — 6370000000 HC RX 637 (ALT 250 FOR IP): Performed by: EMERGENCY MEDICINE

## 2023-04-27 PROCEDURE — 99281 EMR DPT VST MAYX REQ PHY/QHP: CPT

## 2023-04-27 PROCEDURE — 99283 EMERGENCY DEPT VISIT LOW MDM: CPT

## 2023-04-27 RX ORDER — ACETAMINOPHEN 500 MG
1000 TABLET ORAL ONCE
Status: DISCONTINUED | OUTPATIENT
Start: 2023-04-27 | End: 2023-04-27 | Stop reason: HOSPADM

## 2023-04-27 RX ORDER — ACETAMINOPHEN 500 MG
1000 TABLET ORAL ONCE
Status: COMPLETED | OUTPATIENT
Start: 2023-04-27 | End: 2023-04-27

## 2023-04-27 RX ORDER — IBUPROFEN 800 MG/1
800 TABLET ORAL ONCE
Status: DISCONTINUED | OUTPATIENT
Start: 2023-04-27 | End: 2023-04-27 | Stop reason: HOSPADM

## 2023-04-27 RX ADMIN — ACETAMINOPHEN 1000 MG: 500 TABLET ORAL at 22:41

## 2023-04-27 ASSESSMENT — ENCOUNTER SYMPTOMS
RHINORRHEA: 0
COLOR CHANGE: 0
EYE DISCHARGE: 0
NAUSEA: 0
DIARRHEA: 0
SORE THROAT: 0
VOMITING: 0
SHORTNESS OF BREATH: 0
EYE REDNESS: 0
COUGH: 0

## 2023-04-27 ASSESSMENT — PAIN SCALES - GENERAL
PAINLEVEL_OUTOF10: 6
PAINLEVEL_OUTOF10: 10

## 2023-04-27 ASSESSMENT — PAIN - FUNCTIONAL ASSESSMENT
PAIN_FUNCTIONAL_ASSESSMENT: 0-10
PAIN_FUNCTIONAL_ASSESSMENT: 0-10

## 2023-04-27 NOTE — ED PROVIDER NOTES
EMERGENCY DEPARTMENT ENCOUNTER    Pt Name: Link Figures  MRN: [id number]  [place] 1990  Date of evaluation: 4/27/23  CHIEF COMPLAINT       Chief Complaint   Patient presents with    Leg Pain     HISTORY OF PRESENT ILLNESS   The history is provided by the patient and medical records. Patient is a 26-year-old male who presents to the ED with left toe pain. He states he woke up from sleep this evening and his left toe felt numb. It was also itchy. He is dressed appropriately for outside however his socks are wet. He was seen and treated for same problem last night in this ED. REVIEW OF SYSTEMS     Review of Systems   All other systems reviewed and are negative.   PASTMEDICAL HISTORY     Past Medical History:   Diagnosis Date    Anxiety     Arthritis     Asthma     Bipolar I disorder, most recent episode (or current) depressed, unspecified 9/12/2014    Clostridium difficile infection     COPD (chronic obstructive pulmonary disease) ([address]     Depression     Disease of blood and blood forming organ     Eczema     Fracture, metacarpal     R 4th and 5th    Gastric ulcer     Gastritis 06/13/2018    GERD (gastroesophageal reflux disease)     GI bleed     H. 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[address]     Substance abuse ([address]     Hx of opiates, benzos, cocaine, alcohol abuse    Suicidal ideation     Suicide attempt by hanging ([address]     Tobacco dependence     Ulcerative colitis ([address]     UTI (urinary tract infection)      Past Problem List  Patient Active Problem List   Diagnosis Code    Opioid abuse ([address] F11.10    Noncompliance Z91.199    Rectal bleeding K62.5

## 2023-04-28 ENCOUNTER — HOSPITAL ENCOUNTER (EMERGENCY)
Age: 33
Discharge: HOME | End: 2023-04-29
Attending: STUDENT IN AN ORGANIZED HEALTH CARE EDUCATION/TRAINING PROGRAM
Payer: COMMERCIAL

## 2023-04-28 VITALS
DIASTOLIC BLOOD PRESSURE: 80 MMHG | HEIGHT: 67 IN | OXYGEN SATURATION: 98 % | HEART RATE: 97 BPM | BODY MASS INDEX: 31.39 KG/M2 | TEMPERATURE: 98.1 F | SYSTOLIC BLOOD PRESSURE: 130 MMHG | WEIGHT: 200 LBS | RESPIRATION RATE: 16 BRPM

## 2023-04-28 DIAGNOSIS — M79.672 PAIN IN BOTH FEET: Primary | ICD-10-CM

## 2023-04-28 DIAGNOSIS — Z59.00 HOMELESSNESS: ICD-10-CM

## 2023-04-28 DIAGNOSIS — M79.671 PAIN IN BOTH FEET: Primary | ICD-10-CM

## 2023-04-28 PROCEDURE — 6370000000 HC RX 637 (ALT 250 FOR IP): Performed by: STUDENT IN AN ORGANIZED HEALTH CARE EDUCATION/TRAINING PROGRAM

## 2023-04-28 PROCEDURE — 99283 EMERGENCY DEPT VISIT LOW MDM: CPT

## 2023-04-28 RX ORDER — ACETAMINOPHEN 500 MG
1000 TABLET ORAL ONCE
Status: COMPLETED | OUTPATIENT
Start: 2023-04-28 | End: 2023-04-28

## 2023-04-28 RX ADMIN — ACETAMINOPHEN 1000 MG: 500 TABLET ORAL at 23:49

## 2023-04-28 SDOH — ECONOMIC STABILITY - HOUSING INSECURITY: HOMELESSNESS UNSPECIFIED: Z59.00

## 2023-04-28 ASSESSMENT — ENCOUNTER SYMPTOMS
DIARRHEA: 0
CHEST TIGHTNESS: 0
EYE REDNESS: 0
NAUSEA: 0
VOMITING: 0
SORE THROAT: 0
SHORTNESS OF BREATH: 0
RHINORRHEA: 0
EYE DISCHARGE: 0
ABDOMINAL PAIN: 0

## 2023-04-28 ASSESSMENT — PAIN DESCRIPTION - LOCATION: LOCATION: BACK

## 2023-04-28 ASSESSMENT — PAIN SCALES - GENERAL: PAINLEVEL_OUTOF10: 10

## 2023-04-28 NOTE — ED PROVIDER NOTES
[facility] ED  Emergency Department Encounter  Emergency Medicine Resident     Pt Marivel Saloni Tan  MRN: [id number]  [place] 1990  Date of evaluation: 4/27/23  PCP:  No primary care provider on file. Note Started: 11:51 PM EDT      CHIEF COMPLAINT       No chief complaint on file. HISTORY OF PRESENT ILLNESS  (Location/Symptom, Timing/Onset, Context/Setting, Quality, Duration, Modifying Factors, Severity.)      Adiel Ramirez is a 28 y.o. male who presents with back pain. Arrived in exam room to find patient lying comfortably in bed in no apparent distress. Patient states his back pain began 2 hours ago. Has [facility] today for foot pain and was given acetaminophen. He states that he just wants Flexeril. Advised patient that need to examine his back and perform a physical exam and neuro exam and he states that he does not want to be seen anymore. He states that he will go home. Patient refusing to be evaluated here in the ER. Is been observed to ambulate from the ER with no gait abnormalities. He is alert and oriented answering questions appropriately, no slurred speech or visible neuro deficits.    PAST MEDICAL / SURGICAL / SOCIAL / FAMILY HISTORY      has a past medical history of Anxiety, Arthritis, Asthma, Bipolar I disorder, most recent episode (or current) depressed, unspecified, Clostridium difficile infection, COPD (chronic obstructive pulmonary disease) ([address] Depression, Disease of blood and blood forming organ, Eczema, Fracture, metacarpal, Gastric ulcer, Gastritis, GERD (gastroesophageal reflux disease), GI bleed, H. pylori infection, H/O blood clots, Head injury, Headache, Insomnia, Juvenile rheumatoid arthritis ([address] Neuromuscular disorder ([address] PFAPA syndrome ([address] PUD (peptic ulcer disease), Rheumatoid arthritis ([address] Rheumatoid arthritis(714.0), Severe recurrent major depression without psychotic features ([address] Sleep apnea, Still's

## 2023-04-28 NOTE — ED PROVIDER NOTES
EMERGENCY DEPARTMENT ENCOUNTER    Pt Name: Davi Ron  MRN: [id number]  [place] 1990  Date of evaluation: 4/27/23  CHIEF COMPLAINT       Chief Complaint   Patient presents with    Foot Pain    Leg Pain     HISTORY OF PRESENT ILLNESS   This is a 35-year-old male who presents with complaints of pain and discomfort in the left foot. Patient states that he has chronic pain in his feet from walking, denies any fall or injuries. He states that he is tired. Patient describes his symptoms as severe, without any specific alleviating factors, pain is aggravated by walking. REVIEW OF SYSTEMS     Review of Systems   Constitutional:  Negative for chills and fever. HENT:  Negative for rhinorrhea and sore throat. Eyes:  Negative for discharge, redness and visual disturbance. Respiratory:  Negative for cough and shortness of breath. Cardiovascular:  Negative for chest pain, palpitations and leg swelling. Gastrointestinal:  Negative for diarrhea, nausea and vomiting. Musculoskeletal:  Negative for arthralgias, myalgias and neck pain. Foot pain   Skin:  Negative for color change and rash. Neurological:  Negative for seizures, weakness and headaches. Psychiatric/Behavioral:  Negative for hallucinations, self-injury and suicidal ideas.     PASTMEDICAL HISTORY     Past Medical History:   Diagnosis Date    Anxiety     Arthritis     Asthma     Bipolar I disorder, most recent episode (or current) depressed, unspecified 9/12/2014    Clostridium difficile infection     COPD (chronic obstructive pulmonary disease) [facility]     Depression     Disease of blood and blood forming organ     Eczema     Fracture, metacarpal     R 4th and 5th    Gastric ulcer     Gastritis 06/13/2018    GERD (gastroesophageal reflux disease)     GI bleed     H. 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HCC)     Neuromuscular disorder (HCC)     PFAPA

## 2023-04-29 ENCOUNTER — HOSPITAL ENCOUNTER (EMERGENCY)
Age: 33
Discharge: HOME | End: 2023-04-29
Attending: EMERGENCY MEDICINE
Payer: COMMERCIAL

## 2023-04-29 VITALS
TEMPERATURE: 98.1 F | RESPIRATION RATE: 19 BRPM | HEART RATE: 87 BPM | SYSTOLIC BLOOD PRESSURE: 146 MMHG | DIASTOLIC BLOOD PRESSURE: 104 MMHG | OXYGEN SATURATION: 98 %

## 2023-04-29 VITALS
HEART RATE: 98 BPM | WEIGHT: 200 LBS | DIASTOLIC BLOOD PRESSURE: 85 MMHG | OXYGEN SATURATION: 97 % | SYSTOLIC BLOOD PRESSURE: 146 MMHG | TEMPERATURE: 98.4 F | HEIGHT: 67 IN | BODY MASS INDEX: 31.39 KG/M2 | RESPIRATION RATE: 17 BRPM

## 2023-04-29 DIAGNOSIS — M79.671 PAIN IN BOTH FEET: ICD-10-CM

## 2023-04-29 DIAGNOSIS — M79.672 PAIN IN BOTH FEET: ICD-10-CM

## 2023-04-29 DIAGNOSIS — F41.9 ANXIETY: Primary | ICD-10-CM

## 2023-04-29 DIAGNOSIS — M79.672 LEFT FOOT PAIN: Primary | ICD-10-CM

## 2023-04-29 PROCEDURE — 99282 EMERGENCY DEPT VISIT SF MDM: CPT

## 2023-04-29 PROCEDURE — 6370000000 HC RX 637 (ALT 250 FOR IP): Performed by: NURSE PRACTITIONER

## 2023-04-29 PROCEDURE — 99283 EMERGENCY DEPT VISIT LOW MDM: CPT

## 2023-04-29 RX ORDER — ACETAMINOPHEN 500 MG
1000 TABLET ORAL ONCE
Status: COMPLETED | OUTPATIENT
Start: 2023-04-29 | End: 2023-04-29

## 2023-04-29 RX ADMIN — ACETAMINOPHEN 1000 MG: 500 TABLET ORAL at 20:10

## 2023-04-29 ASSESSMENT — ENCOUNTER SYMPTOMS
SORE THROAT: 0
CHEST TIGHTNESS: 0
DIARRHEA: 0
RHINORRHEA: 0
VOMITING: 0
SHORTNESS OF BREATH: 0
COLOR CHANGE: 0
ABDOMINAL PAIN: 0
EYE REDNESS: 0
NAUSEA: 0
EYE DISCHARGE: 0

## 2023-04-29 ASSESSMENT — LIFESTYLE VARIABLES
HOW OFTEN DO YOU HAVE A DRINK CONTAINING ALCOHOL: MONTHLY OR LESS
HOW MANY STANDARD DRINKS CONTAINING ALCOHOL DO YOU HAVE ON A TYPICAL DAY: 1 OR 2

## 2023-04-29 NOTE — ED PROVIDER NOTES
EMERGENCY DEPARTMENT ENCOUNTER    Pt Name: Judith Patel  MRN: [id number]  [place] 1990  Date of evaluation: 4/28/23  CHIEF COMPLAINT       Chief Complaint   Patient presents with    Back Pain     HISTORY OF PRESENT ILLNESS   Is a 27-year-old male is got a history of schizoaffective, polysubstance abuse, homelessness, numerous presentations to the emergency department including multiple this year for foot pain    Initially when I entered the room he states his back is a little bit sore. And he states actually my back is not having any issues but that he has foot pain    He denies any falls or injuries. Denies any numbness tingling weakness no fevers chills redness warmth    Requesting Tylenol and dry socks            REVIEW OF SYSTEMS     Review of Systems   Constitutional:  Negative for chills and fever. HENT:  Negative for rhinorrhea and sore throat. Eyes:  Negative for discharge and redness. Respiratory:  Negative for chest tightness and shortness of breath. Cardiovascular:  Negative for chest pain. Gastrointestinal:  Negative for abdominal pain, diarrhea, nausea and vomiting. Genitourinary:  Negative for dysuria and frequency. Musculoskeletal:  Negative for arthralgias and myalgias. Bilateral foot pain   Skin:  Negative for rash. Neurological:  Negative for weakness and numbness. Psychiatric/Behavioral:  Negative for suicidal ideas. All other systems reviewed and are negative.   PASTMEDICAL HISTORY     Past Medical History:   Diagnosis Date    Anxiety     Arthritis     Asthma     Bipolar I disorder, most recent episode (or current) depressed, unspecified 9/12/2014    Clostridium difficile infection     COPD (chronic obstructive pulmonary disease) ([facility]     Depression     Disease of blood and blood forming organ     Eczema     Fracture, metacarpal     R 4th and 5th    Gastric ulcer     Gastritis 06/13/2018    GERD (gastroesophageal reflux disease)     GI bleed     H. pylori

## 2023-04-29 NOTE — ED NOTES
Mode of arrival (squad #, walk in, police, etc) : walk in        Chief complaint(s): Back pain        Arrival Note (brief scenario, treatment PTA, etc). : Pt here with back pain, bilateral foot pain, and feeling cold. C= \"Have you ever felt that you should Cut down on your drinking? \"  No  A= \"Have people Annoyed you by criticizing your drinking? \"  No  G= \"Have you ever felt bad or Guilty about your drinking? \"  No  E= \"Have you ever had a drink as an Eye-opener first thing in the morning to steady your nerves or to help a hangover? \"  No      Deferred []      Reason for deferring: N/A    *If yes to two or more: probable alcohol abuse. *       Tian Quinn RN  04/28/23 1395

## 2023-04-29 NOTE — ED PROVIDER NOTES
eMERGENCY dEPARTMENT eNCOUnter   Independent Attestation     Pt Name: Sofya Reid  MRN: [id number]  [place] 1990  Date of evaluation: 4/29/23     Sofya Reid is a 28 y.o. male with CC: Foot Pain (bilateral)        This visit was performed by both a physician and an APC. I performed all aspects of the MDM as documented.       The care is provided during an unprecedented national emergency due to the novel coronavirus, Tevin Palma MD  Attending Emergency Physician            Santosh Westfall MD  04/29/23 8332

## 2023-05-01 ENCOUNTER — HOSPITAL ENCOUNTER (EMERGENCY)
Age: 33
Discharge: HOME | End: 2023-05-01
Attending: EMERGENCY MEDICINE
Payer: COMMERCIAL

## 2023-05-01 VITALS
RESPIRATION RATE: 18 BRPM | DIASTOLIC BLOOD PRESSURE: 105 MMHG | OXYGEN SATURATION: 97 % | HEART RATE: 99 BPM | TEMPERATURE: 97.7 F | SYSTOLIC BLOOD PRESSURE: 160 MMHG

## 2023-05-01 DIAGNOSIS — Z59.00 HOMELESSNESS: ICD-10-CM

## 2023-05-01 DIAGNOSIS — Z76.5 MALINGERING: Primary | ICD-10-CM

## 2023-05-01 PROCEDURE — 99282 EMERGENCY DEPT VISIT SF MDM: CPT

## 2023-05-01 SDOH — ECONOMIC STABILITY - HOUSING INSECURITY: HOMELESSNESS UNSPECIFIED: Z59.00

## 2023-05-01 ASSESSMENT — PAIN DESCRIPTION - LOCATION: LOCATION: FOOT

## 2023-05-01 ASSESSMENT — PAIN DESCRIPTION - DESCRIPTORS: DESCRIPTORS: DISCOMFORT

## 2023-05-01 ASSESSMENT — PAIN - FUNCTIONAL ASSESSMENT: PAIN_FUNCTIONAL_ASSESSMENT: 0-10

## 2023-05-01 ASSESSMENT — PAIN DESCRIPTION - ORIENTATION: ORIENTATION: RIGHT;LEFT

## 2023-05-01 ASSESSMENT — PAIN SCALES - GENERAL: PAINLEVEL_OUTOF10: 7

## 2023-05-01 NOTE — ED PROVIDER NOTES
[address] [place] [address] ED  EMERGENCY DEPARTMENT ENCOUNTER      Pt Name: Toan Sharif  MRN: [id number]  [place] 1990  Date of evaluation: 5/1/2023  Provider: Felix Larios MD    [address] [place] [address]       Chief Complaint   Patient presents with    Foot Pain     Bilateral foot pain/ pt denies any new injury         HISTORY OF PRESENT ILLNESS  (Location/Symptom, Timing/Onset, Context/Setting, Quality, Duration, Modifying Factors, Severity.)   Toan Sharif is a 28 y.o. male who presents to the emergency department for unclear reasons. This is his at least 11th visit in 8 days to various emergency departments. He states his feet are wet. Its been raining. He is a very poor historian. He states he has not washed his feet in about 2 weeks. He is homeless per his history. Nursing Notes were reviewed.     ALLERGIES     Dicyclomine, Famotidine, Geodon [ziprasidone hcl], Haloperidol, Iv dye [iodides], Reglan [metoclopramide], Ibuprofen, Aspirin, Dicyclomine hcl, Hydroxyzine hcl, Iodine, Metronidazole, Mirtazapine, Ondansetron hcl, Promethazine, and Ziprasidone    CURRENT MEDICATIONS       Previous Medications    BENZOCAINE-MENTHOL (CEPACOL) 6-10 MG LOZG LOZENGE    Take 1 lozenge by mouth every 2 hours as needed for Sore Throat    CYCLOBENZAPRINE (FLEXERIL) 10 MG TABLET    Take 10 mg by mouth 3 times daily as needed for Muscle spasms    METFORMIN (GLUCOPHAGE) 500 MG TABLET    Take 1 tablet by mouth 2 times daily (with meals) Indications: last dispensed 1/6/23    SERTRALINE (ZOLOFT) 25 MG TABLET    Take 1 tablet by mouth daily    TRAZODONE (DESYREL) 50 MG TABLET    Take 50 mg by mouth nightly as needed for Sleep       PAST MEDICAL HISTORY         Diagnosis Date    Anxiety     Arthritis     Asthma     Bipolar I disorder, most recent episode (or current) depressed, unspecified 9/12/2014    Clostridium difficile infection     COPD (chronic obstructive pulmonary disease) ([facility]     Depression     Disease of blood and blood

## 2023-05-10 ENCOUNTER — HOSPITAL ENCOUNTER (EMERGENCY)
Age: 33
Discharge: HOME | End: 2023-05-10
Attending: EMERGENCY MEDICINE
Payer: COMMERCIAL

## 2023-05-10 VITALS
OXYGEN SATURATION: 98 % | SYSTOLIC BLOOD PRESSURE: 149 MMHG | DIASTOLIC BLOOD PRESSURE: 104 MMHG | HEART RATE: 120 BPM | TEMPERATURE: 98 F | RESPIRATION RATE: 18 BRPM

## 2023-05-10 DIAGNOSIS — F41.1 ANXIETY STATE: ICD-10-CM

## 2023-05-10 DIAGNOSIS — M79.671 PAIN IN BOTH FEET: Primary | ICD-10-CM

## 2023-05-10 DIAGNOSIS — M79.672 PAIN IN BOTH FEET: Primary | ICD-10-CM

## 2023-05-10 PROCEDURE — 99283 EMERGENCY DEPT VISIT LOW MDM: CPT

## 2023-05-10 RX ORDER — GABAPENTIN 300 MG/1
CAPSULE ORAL
COMMUNITY
Start: 2023-03-13

## 2023-05-10 RX ORDER — QUETIAPINE FUMARATE 100 MG/1
TABLET, FILM COATED ORAL
COMMUNITY
Start: 2023-03-13

## 2023-05-10 RX ORDER — CLONIDINE HYDROCHLORIDE 0.1 MG/1
TABLET ORAL
COMMUNITY
Start: 2023-03-13

## 2023-05-10 RX ORDER — BUPRENORPHINE AND NALOXONE 8; 2 MG/1; MG/1
FILM, SOLUBLE BUCCAL; SUBLINGUAL
COMMUNITY
Start: 2023-03-16

## 2023-05-10 RX ORDER — CARBAMAZEPINE 200 MG/1
TABLET ORAL
COMMUNITY
Start: 2023-03-13

## 2023-05-10 RX ORDER — LORAZEPAM 0.5 MG/1
0.5 TABLET ORAL 3 TIMES DAILY PRN
Qty: 6 TABLET | Refills: 0 | Status: SHIPPED | OUTPATIENT
Start: 2023-05-10 | End: 2023-06-09

## 2023-05-10 RX ORDER — PROMETHAZINE HYDROCHLORIDE 25 MG/1
TABLET ORAL
COMMUNITY
Start: 2023-02-22 | End: 2023-05-10

## 2023-05-10 RX ORDER — NALOXONE HYDROCHLORIDE 4 MG/.1ML
SPRAY NASAL
COMMUNITY
Start: 2023-03-13

## 2023-05-10 RX ORDER — HYDROXYZINE HYDROCHLORIDE 25 MG/1
TABLET, FILM COATED ORAL
COMMUNITY
Start: 2023-03-13 | End: 2023-05-10

## 2023-05-10 RX ORDER — ONDANSETRON 4 MG/1
TABLET, ORALLY DISINTEGRATING ORAL
COMMUNITY
Start: 2023-03-13 | End: 2023-05-10

## 2023-05-10 ASSESSMENT — ENCOUNTER SYMPTOMS
EYE DISCHARGE: 0
RHINORRHEA: 0
SORE THROAT: 0
SHORTNESS OF BREATH: 0
EYE REDNESS: 0
VOMITING: 0
COUGH: 0
COLOR CHANGE: 0
NAUSEA: 0
DIARRHEA: 0

## 2023-05-10 ASSESSMENT — PAIN SCALES - GENERAL: PAINLEVEL_OUTOF10: 5

## 2023-05-10 ASSESSMENT — PAIN - FUNCTIONAL ASSESSMENT: PAIN_FUNCTIONAL_ASSESSMENT: 0-10

## 2023-05-10 NOTE — ED NOTES
Pt came out to nurses station to make sure he could get coffee to take with him when he discharges.       Shante Stroud RN  05/10/23 5905

## 2023-05-10 NOTE — ED TRIAGE NOTES
Pt comes to the ED w/ c/o bilateral sore feet that started today. Pt originally also signed in for back pain but then denied back pain when asked. Pt denies any previous injuries or surgeries to either foot. Pt resting in bed w/ no s/s of distress.  Patient denies any needs at this time and has call light within reach, will continue to monitor

## 2023-05-10 NOTE — ED PROVIDER NOTES
EMERGENCY DEPARTMENT ENCOUNTER    Pt Name: Malaika Rodrigues  MRN: [id number]  [place] 1990  Date of evaluation: 5/10/23  CHIEF COMPLAINT       Chief Complaint   Patient presents with    Foot Pain     HISTORY OF PRESENT ILLNESS   This is a 26-year-old male who presents with complaints of anxiety as well as foot pain. REVIEW OF SYSTEMS     Review of Systems   Constitutional:  Negative for chills and fever. HENT:  Negative for rhinorrhea and sore throat. Eyes:  Negative for discharge, redness and visual disturbance. Respiratory:  Negative for cough and shortness of breath. Cardiovascular:  Negative for chest pain, palpitations and leg swelling. Gastrointestinal:  Negative for diarrhea, nausea and vomiting. Genitourinary:  Negative for dysuria and hematuria. Musculoskeletal:  Negative for arthralgias, myalgias and neck pain. Foot pain     Skin:  Negative for color change and rash. Neurological:  Negative for seizures, weakness and headaches. Psychiatric/Behavioral:  Negative for hallucinations, self-injury and suicidal ideas. The patient is nervous/anxious.     PASTMEDICAL HISTORY     Past Medical History:   Diagnosis Date    Anxiety     Arthritis     Asthma     Bipolar I disorder, most recent episode (or current) depressed, unspecified 9/12/2014    Clostridium difficile infection     COPD (chronic obstructive pulmonary disease) ([facility] [address]     Depression     Disease of blood and blood forming organ     Eczema     Fracture, metacarpal     R 4th and 5th    Gastric ulcer     Gastritis 06/13/2018    GERD (gastroesophageal reflux disease)     GI bleed     H. 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HCC)     PUD (peptic ulcer disease)     Rheumatoid arthritis ([facility] [address]     Rheumatoid arthritis(714.0)     Severe recurrent major depression without psychotic features

## 2023-05-19 ENCOUNTER — HOSPITAL ENCOUNTER (EMERGENCY)
Age: 33
Discharge: LEFT BEFORE DISCHARGE | End: 2023-05-19
Attending: EMERGENCY MEDICINE
Payer: COMMERCIAL

## 2023-05-19 VITALS
OXYGEN SATURATION: 95 % | TEMPERATURE: 97.8 F | HEART RATE: 103 BPM | HEIGHT: 67 IN | BODY MASS INDEX: 31.32 KG/M2 | DIASTOLIC BLOOD PRESSURE: 85 MMHG | RESPIRATION RATE: 16 BRPM | SYSTOLIC BLOOD PRESSURE: 142 MMHG

## 2023-05-19 DIAGNOSIS — L85.3 DRY SKIN: Primary | ICD-10-CM

## 2023-05-19 PROCEDURE — 99283 EMERGENCY DEPT VISIT LOW MDM: CPT

## 2023-05-19 RX ORDER — CLOTRIMAZOLE 1 %
CREAM (GRAM) TOPICAL
Qty: 14 G | Refills: 0 | Status: SHIPPED | OUTPATIENT
Start: 2023-05-19 | End: 2023-05-26

## 2023-05-19 ASSESSMENT — PAIN SCALES - GENERAL: PAINLEVEL_OUTOF10: 5

## 2023-05-19 ASSESSMENT — PAIN - FUNCTIONAL ASSESSMENT: PAIN_FUNCTIONAL_ASSESSMENT: 0-10

## 2023-05-19 ASSESSMENT — ENCOUNTER SYMPTOMS: ROS SKIN COMMENTS: DRY SKIN

## 2023-05-19 NOTE — ED NOTES
Pt to ED with c/o pain and itching on both feet. Pt states pain and itching started few minutes prior to arrival at ED. Pt denies chest pain and SOB.       Jennifer Tran RN  05/19/23 4179

## 2023-05-19 NOTE — DISCHARGE INSTRUCTIONS
You were seen in the emergency department for dry skin and concern for athlete's foot. Please use the cream as prescribed. Please call a primary care doctor for follow up in the next few days. Please return to the emergency department with any worsening symptoms.

## 2023-05-19 NOTE — ED PROVIDER NOTES
[address] [facility]     Emergency Department     Faculty Attestation    I performed a history and physical examination of the patient and discussed management with the resident. I have reviewed and agree with the residents findings including all diagnostic interpretations, and treatment plans as written. Any areas of disagreement are noted on the chart. I was personally present for the key portions of any procedures. I have documented in the chart those procedures where I was not present during the key portions. I have reviewed the emergency nurses triage note. I agree with the chief complaint, past medical history, past surgical history, allergies, medications, social and family history as documented unless otherwise noted below. Documentation of the HPI, Physical Exam and Medical Decision Making performed by scribmargo is based on my personal performance of the HPI, PE and MDM. For Physician Assistant/ Nurse Practitioner cases/documentation I have personally evaluated this patient and have completed at least one if not all key elements of the E/M (history, physical exam, and MDM). Additional findings are as noted. Note Started: 3:15 AM EDT     29 yo M c/o bilateral foot itching, no fever, no injury, pt relates hx of athlete foot,   PE vss gcs 15 dry skin to bilateral feet, minimal peeling, tinea appearance,     -I feel patient stable for outpatient treatment  Pt eloped    EKG Interpretation    Interpreted by me      CRITICAL CARE: There was a high probability of clinically significant/life threatening deterioration in this patient's condition which required my urgent intervention. Total critical care time was 0 minutes. This excludes any time for separately reportable procedures.        Dontrell Banks, DO  05/19/23 [address], DO  05/19/23 0817

## 2023-05-23 ENCOUNTER — HOSPITAL ENCOUNTER (EMERGENCY)
Age: 33
Discharge: HOME | End: 2023-05-23
Attending: EMERGENCY MEDICINE
Payer: COMMERCIAL

## 2023-05-23 VITALS
BODY MASS INDEX: 31.39 KG/M2 | SYSTOLIC BLOOD PRESSURE: 143 MMHG | HEIGHT: 67 IN | OXYGEN SATURATION: 97 % | WEIGHT: 200 LBS | DIASTOLIC BLOOD PRESSURE: 93 MMHG | TEMPERATURE: 97.8 F | RESPIRATION RATE: 12 BRPM | HEART RATE: 101 BPM

## 2023-05-23 DIAGNOSIS — F41.9 ANXIETY: Primary | ICD-10-CM

## 2023-05-23 DIAGNOSIS — M79.671 PAIN IN BOTH FEET: ICD-10-CM

## 2023-05-23 DIAGNOSIS — M79.672 PAIN IN BOTH FEET: ICD-10-CM

## 2023-05-23 PROCEDURE — 99283 EMERGENCY DEPT VISIT LOW MDM: CPT

## 2023-05-23 PROCEDURE — 6370000000 HC RX 637 (ALT 250 FOR IP): Performed by: EMERGENCY MEDICINE

## 2023-05-23 RX ORDER — ACETAMINOPHEN 325 MG/1
650 TABLET ORAL ONCE
Status: COMPLETED | OUTPATIENT
Start: 2023-05-23 | End: 2023-05-23

## 2023-05-23 RX ADMIN — ACETAMINOPHEN 650 MG: 325 TABLET ORAL at 03:54

## 2023-05-23 ASSESSMENT — PAIN - FUNCTIONAL ASSESSMENT: PAIN_FUNCTIONAL_ASSESSMENT: 0-10

## 2023-05-23 ASSESSMENT — PAIN SCALES - GENERAL: PAINLEVEL_OUTOF10: 5

## 2023-05-23 NOTE — ED PROVIDER NOTES
Z91.199    Rectal bleeding K62.5    Smoker F17.200    Juvenile rheumatoid arthritis (HCC) M08.00    SRIRAM (obstructive sleep apnea) G47.33    Primary insomnia F51.01    Calculus of bile duct with acute on chronic cholecystitis K80.46    Mild intermittent asthma without complication M87.23    Gastritis K29.70    Generalized abdominal pain R10.84    Anemia D64.9    Elevated liver enzymes R74.8    Nausea and vomiting R11.2    Opioid type dependence, continuous ([facility]) F11.20    Abdominal pain R10.9    Diarrhea R19.7    Irritable bowel syndrome with both constipation and diarrhea K58.2    Schizoaffective disorder, bipolar type ([address] F25.0    GI bleed K92.2    Depression with suicidal ideation F32. A, R45.851    Schizoaffective disorder ([facility]) F25.9    MDD (major depressive disorder), recurrent severe, without psychosis ([address] F33.2    Severe episode of recurrent major depressive disorder, without psychotic features ([address] F33.2    Diabetes mellitus type 2 in obese ([address] E11.69, E66.9    Mixed hyperlipidemia E78.2    Cocaine abuse ([address] F14.10    Acute psychosis ([address] F23    PUD (peptic ulcer disease) K27.9    Gastroesophageal reflux disease without esophagitis K21.9    Prediabetes R73.03    Acute respiratory failure with hypoxia ([address] J96.01     SURGICAL HISTORY       Past Surgical History:   Procedure Laterality Date    ABDOMEN SURGERY      upper GI scope 7/7/2015    BRONCHOSCOPY      CHOLECYSTECTOMY, LAPAROSCOPIC  07/14/2017    surgery performed at [address] [place] [address], COLON, DIAGNOSTIC      OTHER SURGICAL HISTORY      lumbar puncture    NM COLONOSCOPY W/BIOPSY SINGLE/MULTIPLE  8/9/2017    COLONOSCOPY WITH BIOPSY performed by Jack Duarte MD at [facility] EGD TRANSORAL BIOPSY SINGLE/MULTIPLE N/A 3/20/2017    EGD BIOPSY performed by Keyla Walters MD at [facility]    NM ESOPHAGOGASTRODUODENOSCOPY TRANSORAL DIAGNOSTIC N/A 8/9/2017    EGD ESOPHAGOGASTRODUODENOSCOPY performed

## 2023-05-25 ENCOUNTER — HOSPITAL ENCOUNTER (EMERGENCY)
Age: 33
Discharge: HOME | End: 2023-05-25
Attending: STUDENT IN AN ORGANIZED HEALTH CARE EDUCATION/TRAINING PROGRAM
Payer: COMMERCIAL

## 2023-05-25 VITALS
HEIGHT: 67 IN | BODY MASS INDEX: 31.39 KG/M2 | HEART RATE: 84 BPM | TEMPERATURE: 97.8 F | WEIGHT: 200 LBS | SYSTOLIC BLOOD PRESSURE: 138 MMHG | DIASTOLIC BLOOD PRESSURE: 90 MMHG | OXYGEN SATURATION: 96 % | RESPIRATION RATE: 20 BRPM

## 2023-05-25 DIAGNOSIS — G89.29 CHRONIC FOOT PAIN, UNSPECIFIED LATERALITY: ICD-10-CM

## 2023-05-25 DIAGNOSIS — M79.673 CHRONIC FOOT PAIN, UNSPECIFIED LATERALITY: ICD-10-CM

## 2023-05-25 DIAGNOSIS — F41.1 ANXIETY STATE: Primary | ICD-10-CM

## 2023-05-25 PROCEDURE — 6370000000 HC RX 637 (ALT 250 FOR IP): Performed by: STUDENT IN AN ORGANIZED HEALTH CARE EDUCATION/TRAINING PROGRAM

## 2023-05-25 PROCEDURE — 99283 EMERGENCY DEPT VISIT LOW MDM: CPT

## 2023-05-25 RX ORDER — ACETAMINOPHEN 500 MG
1000 TABLET ORAL ONCE
Status: COMPLETED | OUTPATIENT
Start: 2023-05-25 | End: 2023-05-25

## 2023-05-25 RX ADMIN — ACETAMINOPHEN 1000 MG: 500 TABLET ORAL at 03:51

## 2023-05-25 ASSESSMENT — ENCOUNTER SYMPTOMS
SORE THROAT: 0
NAUSEA: 0
RHINORRHEA: 0
ABDOMINAL PAIN: 0
SHORTNESS OF BREATH: 0
EYE REDNESS: 0
VOMITING: 0
CHEST TIGHTNESS: 0
EYE DISCHARGE: 0
DIARRHEA: 0

## 2023-05-25 ASSESSMENT — LIFESTYLE VARIABLES
HOW OFTEN DO YOU HAVE A DRINK CONTAINING ALCOHOL: 2-3 TIMES A WEEK
HOW MANY STANDARD DRINKS CONTAINING ALCOHOL DO YOU HAVE ON A TYPICAL DAY: 1 OR 2

## 2023-05-25 ASSESSMENT — PAIN SCALES - GENERAL: PAINLEVEL_OUTOF10: 5

## 2023-05-25 NOTE — ED PROVIDER NOTES
EMERGENCY DEPARTMENT ENCOUNTER    Pt Name: Tda Remy  MRN: [id number]  [place] 1990  Date of evaluation: 5/25/23  CHIEF COMPLAINT       Chief Complaint   Patient presents with    Fatigue    Foot Pain    Back Pain    Generalized Body Aches     Generalized body pain . Back pain and  foot pain      HISTORY OF PRESENT ILLNESS   28year-old history of anxiety, depression, polysubstance abuse homelessness presents with foot pain and anxiety    Patient presents to the emergency department almost daily for the same    He states he has chronic foot pain and he has some anxiety    He denies suicidal or homicidal ideation. No auditory visual hallucinations. No falls or injuries    No numbness tingling weakness    Pain on the bottom of his feet he walks a lot            REVIEW OF SYSTEMS     Review of Systems   Constitutional:  Negative for chills and fever. HENT:  Negative for rhinorrhea and sore throat. Eyes:  Negative for discharge and redness. Respiratory:  Negative for chest tightness and shortness of breath. Cardiovascular:  Negative for chest pain. Gastrointestinal:  Negative for abdominal pain, diarrhea, nausea and vomiting. Genitourinary:  Negative for dysuria and frequency. Musculoskeletal:  Negative for arthralgias and myalgias. Bilateral foot pain   Skin:  Negative for rash. Neurological:  Negative for weakness and numbness. Psychiatric/Behavioral:  Negative for suicidal ideas. The patient is nervous/anxious. All other systems reviewed and are negative.   PASTMEDICAL HISTORY     Past Medical History:   Diagnosis Date    Anxiety     Arthritis     Asthma     Bipolar I disorder, most recent episode (or current) depressed, unspecified 9/12/2014    Clostridium difficile infection     COPD (chronic obstructive pulmonary disease) ([facility] [address]     Depression     Disease of blood and blood forming organ     Eczema     Fracture, metacarpal     R 4th and 5th    Gastric ulcer     Gastritis

## 2023-05-26 ENCOUNTER — HOSPITAL ENCOUNTER (EMERGENCY)
Age: 33
Discharge: HOME | End: 2023-05-26
Attending: EMERGENCY MEDICINE
Payer: COMMERCIAL

## 2023-05-26 VITALS
DIASTOLIC BLOOD PRESSURE: 74 MMHG | TEMPERATURE: 97.5 F | HEART RATE: 79 BPM | SYSTOLIC BLOOD PRESSURE: 136 MMHG | OXYGEN SATURATION: 98 % | RESPIRATION RATE: 14 BRPM

## 2023-05-26 DIAGNOSIS — M79.671 CHRONIC PAIN OF BOTH FEET: ICD-10-CM

## 2023-05-26 DIAGNOSIS — F41.1 ANXIETY STATE: Primary | ICD-10-CM

## 2023-05-26 DIAGNOSIS — G89.29 CHRONIC PAIN OF BOTH FEET: ICD-10-CM

## 2023-05-26 DIAGNOSIS — M79.672 CHRONIC PAIN OF BOTH FEET: ICD-10-CM

## 2023-05-26 PROCEDURE — 99283 EMERGENCY DEPT VISIT LOW MDM: CPT

## 2023-05-26 PROCEDURE — 6370000000 HC RX 637 (ALT 250 FOR IP): Performed by: STUDENT IN AN ORGANIZED HEALTH CARE EDUCATION/TRAINING PROGRAM

## 2023-05-26 RX ADMIN — DICLOFENAC SODIUM 2 G: 10 GEL TOPICAL at 04:43

## 2023-05-26 ASSESSMENT — PAIN SCALES - GENERAL: PAINLEVEL_OUTOF10: 10

## 2023-05-26 ASSESSMENT — PAIN - FUNCTIONAL ASSESSMENT: PAIN_FUNCTIONAL_ASSESSMENT: 0-10

## 2023-05-26 NOTE — ED PROVIDER NOTES
[address] ED  Emergency Department Encounter  Emergency Medicine Resident     Pt Shruthi Kraft Eloisa Melendez  MRN: [id number]  [place] 1990  Date of evaluation: 5/26/23  PCP:  No primary care provider on file. Note Started: 5:12 AM EDT      CHIEF COMPLAINT       Chief Complaint   Patient presents with    Foot Pain     Bilateral       HISTORY OF PRESENT ILLNESS  (Location/Symptom, Timing/Onset, Context/Setting, Quality, Duration, Modifying Factors, Severity.)      Danielle Harper is a 28 y.o. male who presents with bilateral foot pain ongoing today. Patient also reports he is feeling anxious and follows at the [facility]. No SI or HI. On further discussion, patient reports he has been walking around all day and appears to be wearing leather dress shoes. Patient does admit to homelessness and not having anywhere to stay tonight. On chart review, patient has been to other hospitals today with the same complaint. Patient is able to walk and denies any numbness or weakness in his feet. Has not taken anything for the pain today.      PAST MEDICAL / SURGICAL / SOCIAL / FAMILY HISTORY      has a past medical history of Anxiety, Arthritis, Asthma, Bipolar I disorder, most recent episode (or current) depressed, unspecified, Clostridium difficile infection, COPD (chronic obstructive pulmonary disease) ([address] Depression, Disease of blood and blood forming organ, Eczema, Fracture, metacarpal, Gastric ulcer, Gastritis, GERD (gastroesophageal reflux disease), GI bleed, H. pylori infection, H/O blood clots, Head injury, Headache, Insomnia, Juvenile rheumatoid arthritis ([address] Neuromuscular disorder ([address] PFAPA syndrome ([address] PUD (peptic ulcer disease), Rheumatoid arthritis ([address] Rheumatoid arthritis(714.0), Severe recurrent major depression without psychotic features ([address] Sleep apnea, Still's disease ([address] Substance abuse ([address] Suicidal ideation, Suicide attempt by hanging ([address] Tobacco dependence,

## 2023-05-26 NOTE — ED TRIAGE NOTES
Pt presents to ED with bilateral foot pain. Pt states he is homeless and has been walking a lot which cause his feet to burn. Pt has no obvious deformity. Pt states he is having anxiety.

## 2023-05-26 NOTE — DISCHARGE INSTRUCTIONS
Thank you for coming to [facility] emergency department! You were seen today for foot pain and anxiety. You were given voltaren. Follow up with [address] [place] [address]. If you have any worsening of symptoms or any other concerns, please return to the emergency department. We are always available!

## 2023-05-27 NOTE — ED PROVIDER NOTES
[address] [facility]   Emergency Department  Faculty Attestation       I performed a history and physical examination of the patient and discussed management with the resident. I reviewed the residents note and agree with the documented findings including all diagnostic interpretations and plan of care. Any areas of disagreement are noted on the chart. I was personally present for the key portions of any procedures. I have documented in the chart those procedures where I was not present during the key portions. I have reviewed the emergency nurses triage note. I agree with the chief complaint, past medical history, past surgical history, allergies, medications, social and family history as documented unless otherwise noted below. For Physician Assistant/ Nurse Practitioner cases/documentation I have personally evaluated this patient and have completed at least one if not all key elements of the E/M (history, physical exam, and MDM). Additional findings are as noted. Patient Name: Steffen Thornton  MRN: [id number]  : 1990  Primary Care Physician: No primary care provider on file. Date of evaluationa: 2023   Note Started: 10:33 AM EDT    Pertinent Comments     Chief Complaint:   Chief Complaint   Patient presents with    Foot Pain     Bilateral        Initial vitals: (If not listed, please see nursing documentation)  ED Triage Vitals [23 0401]   BP Temp Temp src Pulse Respirations SpO2 Height Weight   136/74 97.5 °F (36.4 °C) -- 79 14 98 % -- --        HPI/PE/Impression: This is a 28 y.o. male who presents to the Emergency Department bilateral foot pain. Has been walking in dressy shoes and is homeless. On exam he is awake alert answering questions. Eating food without difficulty. Bilateral feet with no significant erythema or edema. No wounds. Walking with steady gait. Medical Decision Making  Foot pain. No signs of any acute trauma to the feet.   Likely secondary to walking

## 2023-05-28 ENCOUNTER — HOSPITAL ENCOUNTER (EMERGENCY)
Age: 33
Discharge: HOME | End: 2023-05-28
Attending: EMERGENCY MEDICINE
Payer: COMMERCIAL

## 2023-05-28 VITALS
DIASTOLIC BLOOD PRESSURE: 99 MMHG | HEIGHT: 67 IN | WEIGHT: 200 LBS | TEMPERATURE: 99.1 F | RESPIRATION RATE: 16 BRPM | OXYGEN SATURATION: 96 % | BODY MASS INDEX: 31.39 KG/M2 | HEART RATE: 94 BPM | SYSTOLIC BLOOD PRESSURE: 153 MMHG

## 2023-05-28 DIAGNOSIS — Z76.0 ENCOUNTER FOR MEDICATION REFILL: Primary | ICD-10-CM

## 2023-05-28 PROCEDURE — 99283 EMERGENCY DEPT VISIT LOW MDM: CPT

## 2023-05-28 RX ORDER — QUETIAPINE FUMARATE 100 MG/1
100 TABLET, FILM COATED ORAL NIGHTLY
Qty: 30 TABLET | Refills: 0 | Status: SHIPPED | OUTPATIENT
Start: 2023-05-28 | End: 2023-06-27

## 2023-05-28 ASSESSMENT — PAIN DESCRIPTION - DESCRIPTORS: DESCRIPTORS: SORE

## 2023-05-28 ASSESSMENT — PAIN - FUNCTIONAL ASSESSMENT: PAIN_FUNCTIONAL_ASSESSMENT: 0-10

## 2023-05-28 ASSESSMENT — PAIN DESCRIPTION - LOCATION: LOCATION: FOOT

## 2023-05-28 ASSESSMENT — PAIN DESCRIPTION - ORIENTATION: ORIENTATION: LEFT;RIGHT

## 2023-05-28 ASSESSMENT — PAIN SCALES - GENERAL: PAINLEVEL_OUTOF10: 5

## 2023-05-29 ENCOUNTER — HOSPITAL ENCOUNTER (EMERGENCY)
Age: 33
Discharge: LEFT BEFORE DISCHARGE | End: 2023-05-29
Attending: EMERGENCY MEDICINE
Payer: COMMERCIAL

## 2023-05-29 VITALS
WEIGHT: 200 LBS | HEIGHT: 70 IN | TEMPERATURE: 98.9 F | DIASTOLIC BLOOD PRESSURE: 64 MMHG | SYSTOLIC BLOOD PRESSURE: 131 MMHG | HEART RATE: 100 BPM | OXYGEN SATURATION: 92 % | BODY MASS INDEX: 28.63 KG/M2 | RESPIRATION RATE: 19 BRPM

## 2023-05-29 DIAGNOSIS — T50.901A ACCIDENTAL OVERDOSE, INITIAL ENCOUNTER: Primary | ICD-10-CM

## 2023-05-29 PROCEDURE — 99284 EMERGENCY DEPT VISIT MOD MDM: CPT

## 2023-05-29 PROCEDURE — 2580000003 HC RX 258: Performed by: EMERGENCY MEDICINE

## 2023-05-29 PROCEDURE — 93005 ELECTROCARDIOGRAM TRACING: CPT | Performed by: EMERGENCY MEDICINE

## 2023-05-29 RX ORDER — 0.9 % SODIUM CHLORIDE 0.9 %
1000 INTRAVENOUS SOLUTION INTRAVENOUS ONCE
Status: COMPLETED | OUTPATIENT
Start: 2023-05-29 | End: 2023-05-29

## 2023-05-29 RX ADMIN — SODIUM CHLORIDE 1000 ML: 9 INJECTION, SOLUTION INTRAVENOUS at 14:39

## 2023-05-29 ASSESSMENT — ENCOUNTER SYMPTOMS
ABDOMINAL PAIN: 0
CHEST TIGHTNESS: 0
SHORTNESS OF BREATH: 0

## 2023-05-29 ASSESSMENT — PAIN DESCRIPTION - LOCATION: LOCATION: GENERALIZED

## 2023-05-29 ASSESSMENT — PAIN SCALES - GENERAL: PAINLEVEL_OUTOF10: 7

## 2023-05-29 NOTE — ED PROVIDER NOTES
[address] ED     Emergency Department     Faculty Attestation        I performed a history and physical examination of the patient and discussed management with the resident. I reviewed the residents note and agree with the documented findings and plan of care. Any areas of disagreement are noted on the chart. I was personally present for the key portions of any procedures. I have documented in the chart those procedures where I was not present during the key portions. I have reviewed the emergency nurses triage note. I agree with the chief complaint, past medical history, past surgical history, allergies, medications, social and family history as documented unless otherwise noted below. For Physician Assistant/ Nurse Practitioner cases/documentation I have personally evaluated this patient and have completed at least one if not all key elements of the E/M (history, physical exam, and MDM). Additional findings are as noted. Vital Signs: BP: (!) 153/99  Pulse: 94  Respirations: 16  Temp: 99.1 °F (37.3 °C) SpO2: 96 %  PCP:  No primary care provider on file. Note Started: 5/28/23, 8:37 PM EDT    Pertinent Comments:         Critical Care  None      (Please note that portions of this note were completed with a voice recognition program. Efforts were made to edit the dictations but occasionally words are mis-transcribed.  Whenever words are used in this note in any gender, they shall be construed as though they were used in the gender appropriate to the circumstances; and whenever words are used in this note in the singular or plural form, they shall be construed as though they were used in the form appropriate to the circumstances.)    MD Jovanni Barahona  Attending Emergency Medicine Physician           Renard Rinaldi MD  05/28/23 7410

## 2023-05-29 NOTE — ED PROVIDER NOTES
[facility] ED  Emergency Department Encounter  Emergency Medicine Resident     Pt Randy Melvin  MRN: [id number]  [place] 1990  Date of evaluation: 5/28/23  PCP:  No primary care provider on file. Note Started: 8:24 PM EDT      CHIEF COMPLAINT       No chief complaint on file. Medication refill    HISTORY OF PRESENT ILLNESS  (Location/Symptom, Timing/Onset, Context/Setting, Quality, Duration, Modifying Factors, Severity.)      Julia Evans is a 28 y.o. male who presents requesting refills on his Rx metformin and Seroquel so he can stay at [facility]. States he takes metformin 500 twice daily and Seroquel 100 at night. Patient notes continued chronic pain in his feet however no worse other symptoms including chest pain, shortness of breath, fevers, chills. Patient notes he has a PCP who usually prescribes these but he has not been able to see them    PAST MEDICAL / SURGICAL / SOCIAL / FAMILY HISTORY      has a past medical history of Anxiety, Arthritis, Asthma, Bipolar I disorder, most recent episode (or current) depressed, unspecified, Clostridium difficile infection, COPD (chronic obstructive pulmonary disease) ([address] Depression, Disease of blood and blood forming organ, Eczema, Fracture, metacarpal, Gastric ulcer, Gastritis, GERD (gastroesophageal reflux disease), GI bleed, H. pylori infection, H/O blood clots, Head injury, Headache, Insomnia, Juvenile rheumatoid arthritis ([address] Neuromuscular disorder ([address] PFAPA syndrome ([address] PUD (peptic ulcer disease), Rheumatoid arthritis ([address] Rheumatoid arthritis(714.0), Severe recurrent major depression without psychotic features ([address] Sleep apnea, Still's disease ([address] Substance abuse ([address] Suicidal ideation, Suicide attempt by hanging ([address] Tobacco dependence, Ulcerative colitis ([address] and UTI (urinary tract infection).        has a past surgical history that includes Colonoscopy; bronchoscopy; other surgical

## 2023-05-29 NOTE — DISCHARGE INSTRUCTIONS
You were seen for medication refill. Your Seroquel and metformin were prescribed as they were previously. You should take the medications as prescribed and do not skip doses of metformin. Please return to the emergency department for any chest pain, shortness of breath, fevers, chills, changes in mental status, nausea, vomiting, other new or concerning symptoms. Otherwise you need to follow-up with your primary care doctor to have these medications refilled in the future. If you do not have a primary care doctor there is one recommended in this paperwork.

## 2023-05-29 NOTE — ED NOTES
Pt asked write for water, writer exited room to get water. Writer returned to room and pt removed IV. When writer questioned what pt was doing pt stated \"I'm okay\" and proceeded to gather belongings and leave the ED. Dr. King Has notified and witnessed pt walk out of ED. Dr. Kevin Shields to be notified.       Shanti Ibrahim RN  05/29/23 9630

## 2023-05-29 NOTE — ED PROVIDER NOTES
I performed a history and physical examination of the patient and discussed management with the resident. I reviewed the residents note and agree with the documented findings and plan of care. Any areas of disagreement are noted on the chart. I was personally present for the key portions of any procedures. I have documented in the chart those procedures where I was not present during the key portions. I have reviewed the emergency nurses triage note. I agree with the chief complaint, past medical history, past surgical history, allergies, medications, social and family history as documented unless otherwise noted below. Documentation of the HPI, Physical Exam and Medical Decision Making performed by medical students or scribes is based on my personal performance of the HPI, PE and MDM. For Phys Assistant/ Nurse Practitioner cases/documentation I have personally evaluated this patient and have completed at least one if not all key elements of the E/M (history, physical exam, and MDM). I find the patient's history and physical exam are consistent with the NP/PA documentation. I agree with the care provided, treatment rendered, disposition and followup plan. Additional findings are as noted. Echo Arevalo. Chacha Land MD  Attending Emergency  Physician        This patient presented to the emergency department via EMS after having apparently sustained a drug overdose. EMS report that the patient was found unresponsive and they administered naloxone 2 mg IV and the patient was awakened. Patient states that he was smoking \"a cigar\" and he does not know what else happened. He denies actually ingesting or intentionally smoking any intoxicating substances. Patient has a longstanding history of chronic psychiatric disorder and is a frequent visitor to the emergency department. He denies any suicidal ideation. He is only complaining of \"I hurt all over\". He denies any history of trauma.   On examination patient is awake
Cullen,    sertraline (ZOLOFT) 25 MG tablet Take 1 tablet by mouth daily 2/2/23   MASSIEL Melendez CNP   cyclobenzaprine (FLEXERIL) 10 MG tablet Take 1 tablet by mouth 3 times daily as needed for Muscle spasms    Historical Provider, MD   traZODone (DESYREL) 50 MG tablet Take 1 tablet by mouth nightly as needed for Sleep    Historical Provider, MD   meloxicam (MOBIC) 7.5 MG tablet Take 1 tablet by mouth 2 times daily as needed for Pain 8/19/21 10/30/21  MASSIEL Elmore - CNP         REVIEW OF SYSTEMS       Review of Systems   Constitutional:  Negative for fever. Respiratory:  Negative for chest tightness. Cardiovascular:  Negative for chest pain. Gastrointestinal:  Negative for abdominal pain. Musculoskeletal:  Positive for myalgias. Neurological:  Positive for syncope. Negative for dizziness, weakness and light-headedness. PHYSICAL EXAM      INITIAL VITALS:   /64   Pulse 100   Temp 98.9 °F (37.2 °C) (Oral)   Resp 19   Ht 5' 10\" (1.778 m)   Wt 200 lb (90.7 kg)   SpO2 92%   BMI 28.70 kg/m²     Physical Exam  Constitutional:       Appearance: Normal appearance. HENT:      Head: Normocephalic. Mouth/Throat:      Mouth: Mucous membranes are moist.   Cardiovascular:      Rate and Rhythm: Normal rate. Pulmonary:      Effort: Pulmonary effort is normal.      Breath sounds: Normal breath sounds. Abdominal:      General: Abdomen is flat. Palpations: Abdomen is soft. Musculoskeletal:         General: Normal range of motion. Skin:     General: Skin is warm and dry. Capillary Refill: Capillary refill takes less than 2 seconds. Neurological:      General: No focal deficit present. Mental Status: He is alert and oriented to person, place, and time.    Psychiatric:         Mood and Affect: Mood normal.         Behavior: Behavior normal.         DDX/DIAGNOSTIC RESULTS / EMERGENCY DEPARTMENT COURSE / MDM     Medical Decision Making  Patient is in

## 2023-05-29 NOTE — ED NOTES
Pt arrived to ED alert and oriented x4 via EMS. Pt to ED s/p drug overdose. EMS reported that pt was found down outside, stated pt was unresponsive. EMS reported giving 2 mg Narcan with response. On arrival, pt alert and answering questions appropriately. Pt reports that thought he smoked a cigar earlier today. Pt reports pain in his bones. Pt denies having been around anyone suspected to have COVID-19 or anyone that has been sick, denies recent travel outside the state of [address] or [address]. Pt placed on cardiac monitor, continuous pulse ox, and BP cuff. RR even and unlabored. NAD noted. Whiteboard updated. Will continue with plan of care.        Mony Samuel RN  05/29/23 2326

## 2023-05-29 NOTE — ED TRIAGE NOTES
Pt presents to the ed with bilateral foot pain and refill on medication Metformin and Seroquel. Pt stated that he went to Zepf center and was told to receive an prescription from ED for Zepf to fill. Pt stated he feet pain is a 5 out 10. Pt alert and oriented x4, talking in complete sentences, respirations even and unlabored. Pt acting age appropriate. White board updated, will continue to plan of care.

## 2023-05-30 LAB
EKG ATRIAL RATE: 99 BPM
EKG P AXIS: 68 DEGREES
EKG P-R INTERVAL: 142 MS
EKG Q-T INTERVAL: 352 MS
EKG QRS DURATION: 84 MS
EKG QTC CALCULATION (BAZETT): 451 MS
EKG R AXIS: 64 DEGREES
EKG T AXIS: 54 DEGREES
EKG VENTRICULAR RATE: 99 BPM

## 2023-05-30 PROCEDURE — 93010 ELECTROCARDIOGRAM REPORT: CPT | Performed by: INTERNAL MEDICINE

## 2023-05-31 ENCOUNTER — HOSPITAL ENCOUNTER (EMERGENCY)
Age: 33
Discharge: HOME | End: 2023-05-31
Attending: EMERGENCY MEDICINE
Payer: COMMERCIAL

## 2023-05-31 VITALS
RESPIRATION RATE: 14 BRPM | HEART RATE: 100 BPM | TEMPERATURE: 97 F | OXYGEN SATURATION: 97 % | SYSTOLIC BLOOD PRESSURE: 147 MMHG | DIASTOLIC BLOOD PRESSURE: 103 MMHG

## 2023-05-31 DIAGNOSIS — Z59.00 HOMELESSNESS: Primary | ICD-10-CM

## 2023-05-31 PROCEDURE — 99283 EMERGENCY DEPT VISIT LOW MDM: CPT

## 2023-05-31 PROCEDURE — 6370000000 HC RX 637 (ALT 250 FOR IP): Performed by: STUDENT IN AN ORGANIZED HEALTH CARE EDUCATION/TRAINING PROGRAM

## 2023-05-31 RX ORDER — ACETAMINOPHEN 325 MG/1
650 TABLET ORAL ONCE
Status: COMPLETED | OUTPATIENT
Start: 2023-05-31 | End: 2023-05-31

## 2023-05-31 RX ADMIN — ACETAMINOPHEN 650 MG: 325 TABLET ORAL at 02:34

## 2023-05-31 SDOH — ECONOMIC STABILITY - HOUSING INSECURITY: HOMELESSNESS UNSPECIFIED: Z59.00

## 2023-05-31 ASSESSMENT — ENCOUNTER SYMPTOMS
CONSTIPATION: 0
CHEST TIGHTNESS: 0
COLOR CHANGE: 0
SINUS PAIN: 0
DIARRHEA: 0
SINUS PRESSURE: 0
SORE THROAT: 0
FACIAL SWELLING: 0
NAUSEA: 0
ABDOMINAL PAIN: 0
SHORTNESS OF BREATH: 0
TROUBLE SWALLOWING: 0
WHEEZING: 0
VOMITING: 0
COUGH: 0
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
## 2023-05-31 NOTE — DISCHARGE INSTRUCTIONS
Thank you for visiting [facility] Emergency Department. You need to call No primary care provider on file. to make an appointment as directed for follow up. Should you have any questions regarding your care or further treatment, please call [facility] Emergency Department at [phone].
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
## 2023-06-04 ENCOUNTER — HOSPITAL ENCOUNTER (EMERGENCY)
Age: 33
Discharge: HOME | End: 2023-06-04
Payer: COMMERCIAL

## 2023-06-04 VITALS
HEIGHT: 67 IN | TEMPERATURE: 97.5 F | HEART RATE: 111 BPM | DIASTOLIC BLOOD PRESSURE: 109 MMHG | OXYGEN SATURATION: 97 % | WEIGHT: 200 LBS | RESPIRATION RATE: 18 BRPM | SYSTOLIC BLOOD PRESSURE: 151 MMHG | BODY MASS INDEX: 31.39 KG/M2

## 2023-06-04 DIAGNOSIS — R68.89 MULTIPLE SOMATIC COMPLAINTS: Primary | ICD-10-CM

## 2023-06-04 DIAGNOSIS — F99 PSYCHIATRIC DISORDER: ICD-10-CM

## 2023-06-04 PROCEDURE — 99282 EMERGENCY DEPT VISIT SF MDM: CPT

## 2023-06-04 ASSESSMENT — ENCOUNTER SYMPTOMS
COUGH: 0
NAUSEA: 0
ABDOMINAL PAIN: 0
VOMITING: 0
RHINORRHEA: 0
SHORTNESS OF BREATH: 0

## 2023-06-04 ASSESSMENT — PAIN - FUNCTIONAL ASSESSMENT: PAIN_FUNCTIONAL_ASSESSMENT: 0-10

## 2023-06-04 ASSESSMENT — PAIN SCALES - GENERAL: PAINLEVEL_OUTOF10: 6

## 2023-06-05 ENCOUNTER — HOSPITAL ENCOUNTER (EMERGENCY)
Age: 33
Discharge: HOME | End: 2023-06-05
Payer: COMMERCIAL

## 2023-06-05 ENCOUNTER — HOSPITAL ENCOUNTER (EMERGENCY)
Age: 33
Discharge: HOME | End: 2023-06-05
Attending: EMERGENCY MEDICINE
Payer: COMMERCIAL

## 2023-06-05 VITALS
DIASTOLIC BLOOD PRESSURE: 88 MMHG | TEMPERATURE: 97.3 F | SYSTOLIC BLOOD PRESSURE: 160 MMHG | OXYGEN SATURATION: 98 % | HEART RATE: 105 BPM | RESPIRATION RATE: 26 BRPM

## 2023-06-05 VITALS
HEIGHT: 67 IN | OXYGEN SATURATION: 99 % | SYSTOLIC BLOOD PRESSURE: 156 MMHG | DIASTOLIC BLOOD PRESSURE: 92 MMHG | RESPIRATION RATE: 16 BRPM | TEMPERATURE: 98 F | WEIGHT: 200 LBS | BODY MASS INDEX: 31.39 KG/M2 | HEART RATE: 85 BPM

## 2023-06-05 DIAGNOSIS — F41.9 ANXIETY: Primary | ICD-10-CM

## 2023-06-05 DIAGNOSIS — F30.9 MANIA (HCC): Primary | ICD-10-CM

## 2023-06-05 DIAGNOSIS — F25.9 SCHIZOAFFECTIVE DISORDER, UNSPECIFIED TYPE (HCC): ICD-10-CM

## 2023-06-05 PROCEDURE — 6370000000 HC RX 637 (ALT 250 FOR IP): Performed by: PHYSICIAN ASSISTANT

## 2023-06-05 PROCEDURE — 99283 EMERGENCY DEPT VISIT LOW MDM: CPT

## 2023-06-05 PROCEDURE — 6370000000 HC RX 637 (ALT 250 FOR IP)

## 2023-06-05 PROCEDURE — 96372 THER/PROPH/DIAG INJ SC/IM: CPT

## 2023-06-05 PROCEDURE — 99284 EMERGENCY DEPT VISIT MOD MDM: CPT

## 2023-06-05 PROCEDURE — 6360000002 HC RX W HCPCS

## 2023-06-05 RX ORDER — DIPHENHYDRAMINE HCL 25 MG
50 TABLET ORAL ONCE
Status: COMPLETED | OUTPATIENT
Start: 2023-06-05 | End: 2023-06-05

## 2023-06-05 RX ORDER — MIDAZOLAM HYDROCHLORIDE 10 MG/2ML
5 INJECTION, SOLUTION INTRAMUSCULAR; INTRAVENOUS ONCE
Status: COMPLETED | OUTPATIENT
Start: 2023-06-05 | End: 2023-06-05

## 2023-06-05 RX ORDER — DIPHENHYDRAMINE HCL 25 MG
25 TABLET ORAL ONCE
Status: DISCONTINUED | OUTPATIENT
Start: 2023-06-05 | End: 2023-06-06 | Stop reason: HOSPADM

## 2023-06-05 RX ORDER — DIPHENHYDRAMINE HYDROCHLORIDE 50 MG/ML
50 INJECTION INTRAMUSCULAR; INTRAVENOUS ONCE
Status: COMPLETED | OUTPATIENT
Start: 2023-06-05 | End: 2023-06-05

## 2023-06-05 RX ADMIN — DIPHENHYDRAMINE HYDROCHLORIDE 50 MG: 50 INJECTION, SOLUTION INTRAMUSCULAR; INTRAVENOUS at 18:43

## 2023-06-05 RX ADMIN — DIPHENHYDRAMINE HYDROCHLORIDE 50 MG: 25 TABLET ORAL at 03:10

## 2023-06-05 RX ADMIN — MIDAZOLAM HYDROCHLORIDE 5 MG: 5 INJECTION, SOLUTION INTRAMUSCULAR; INTRAVENOUS at 18:43

## 2023-06-05 ASSESSMENT — ENCOUNTER SYMPTOMS
SHORTNESS OF BREATH: 0
VOMITING: 0
NAUSEA: 0
NAUSEA: 0
SHORTNESS OF BREATH: 0
ABDOMINAL PAIN: 0
ROS SKIN COMMENTS: GENERALIZED PRURITIS
ABDOMINAL PAIN: 0
RHINORRHEA: 0
VOMITING: 0

## 2023-06-05 ASSESSMENT — SLEEP AND FATIGUE QUESTIONNAIRES
AVERAGE NUMBER OF SLEEP HOURS: 3
DO YOU USE A SLEEP AID: NO
DO YOU HAVE DIFFICULTY SLEEPING: YES
SLEEP PATTERN: DIFFICULTY FALLING ASLEEP;DISTURBED/INTERRUPTED SLEEP

## 2023-06-05 ASSESSMENT — PAIN - FUNCTIONAL ASSESSMENT: PAIN_FUNCTIONAL_ASSESSMENT: 0-10

## 2023-06-05 ASSESSMENT — PAIN DESCRIPTION - LOCATION: LOCATION: GENERALIZED

## 2023-06-05 ASSESSMENT — PAIN SCALES - GENERAL: PAINLEVEL_OUTOF10: 8

## 2023-06-05 ASSESSMENT — PAIN DESCRIPTION - DESCRIPTORS: DESCRIPTORS: ACHING

## 2023-06-05 NOTE — ED TRIAGE NOTES
Pt arrived to ED via EMS. [place] PD was called due to pt doing \"jumping jacks in the middle of traffic as well as skipping. \" EMS noted \"once patient was put in the back of the ambulance, he became lethargic and was not staying awake unless spoken to. \" Upon arrival pt was acting confused and was disoriented. Pt would not allow medical staff to apply oxygen. Pt began to itch himself uncontrollably. Kelsie AVILA at bedside. Pt initially was sating at 80% spo2, and he would not keep his monitor attached after many efforts ; all other vitals are stable at this time. Will continue to monitor.

## 2023-06-05 NOTE — ED TRIAGE NOTES
Pt presents to ED via EMS. Pt states his \"body hurts and I need you guys to check me out. \" Pt reports feeling anxious; states he has been unable to take his medications. Pt reports being homeless and that he went to [facility] today but was told he would have to wait a week to get in. Pt states, \"when the planets all align, this place will finally light up and actually be a place. \"

## 2023-06-05 NOTE — ED NOTES
Pt continues to not follow commands, has a flight of ideas, and does not wish to stay on stretcher. Pt given boxed lunch and pop per pt request pt not able to comprehend that food is at bedside for him to eat. Dr. Felix Harrington at bedside. Pt continues to scratch genitals, buttocks, and then touch his face. Pt needs frequent redirection and has no evidence of learning. [facility] called to assist with medication administration.      Rosi Maxwell RN  06/05/23 1678

## 2023-06-05 NOTE — ED NOTES
Pt placed on continuous pulse ox, SpO2 reading 65% on RA. Pt resting with eyes closed, snoring RR heard. Pt placed on 5L via NC with improvement to 93%.      Ludwin Ibarra RN  06/05/23 5550

## 2023-06-05 NOTE — PROGRESS NOTES
Chief Complaint:   Anxiety      Provisional Diagnosis:  Unspecified Anxiety Disorder      Risk, Psychosocial and Contextual Factors: (homeless, lack of social support etc.): Homeless, limited primary support      Current  Treatment:  [address] Present Suicidal Behavior:    Verbal: Denies    Attempt: Denies      Access to Weapons: Denies      C-SSRS Current Suicide Risk: Low, Moderate or High:  Low         Past Suicidal Behavior:    Verbal: Denies    Attempts: Denies      Self-Injurious/Self-Mutilation: (Specify) Denies      Traumatic Event Within Past 2 Weeks: (Specify) Denies      Current Abuse:  (Specify) Denies      Legal: (Specify) Denies      Violence: (Specify) Denies      Protective Factors:  Care at [address]: Homeless      CPAP/Oxygen/Ambulation Difficulties: na      Basic Vital Signs:      Critical Labs:      Risk Factors:  Difficulty with treatment compliance      Clinical Summary:    Patient is a [de-identified] [age] year old male who presents to [address] [place] [address] with complaints of anxiety, homeless and 'pain'. Patient reports he is from [facility] and has been residing in [facility]. Patient reports increase in anxiety and chronic pain. Patient denies delusions/hallucinations. Patient did turn his head and was mumbling during interview. Patient has a history with [address] Patient was admitted to the Crisis Stabilization Unit on 06/01/23 -06/02/23. Clinician spoke with Mili Jaramillo from [facility] concerning housing. Mili Jaramillo reports patient left AMA on 06/02/23. Due to leaving against medical advise patient would not be considered for their unit until one week has passed. Patient is requesting information on housing/transport to [facility]. Patient reports acquaintances/family in [facility]. Patient denies any thought plan or intent to harm self or others. 02:30 Waiting on medical clearance.      Level of Care Disposition:      Consulted with Avni Lutz

## 2023-06-05 NOTE — ED NOTES
Pt continuously scratching genitals, buttocks, and then touching face. Pt needs frequent redirection and not following commands. Dr. Guajardo Moh notified and at bedside. Pt refused to be on monitor, continue to remove self from monitor. Waiting on further orders.      Pb Crespo RN  06/05/23 6021

## 2023-06-05 NOTE — ED NOTES
Report received from [address] [place] [address] [address]     Pt sleeping on stretcher, respirations unlabored, NAD.    White board updated, call light within reach        FLORY Moore  06/05/23 1928

## 2023-06-05 NOTE — ED PROVIDER NOTES
[facility] [address] [address] EMERGENCY DEPT      Pt Name: Chloe Brown  MRN: [id number]  [place] 1990  Date of evaluation: 6/5/2023  Provider: Issac Fraire PA-C    CHIEF COMPLAINT       Chief Complaint   Patient presents with    Anxiety           Homeless       Nurses Notes reviewed and I agree except as noted in the HPI. HISTORY OF PRESENT ILLNESS    Chloe Brown is a 28 y.o. male who presents via EMS for anxiety and pain. Patient states \"I was hoping I could just lay here for a while and you could give me medicine for anxiety and pain. \"  Patient was here yesterday for similar complaints. He was eventually sent to [facility] and reports today that [facility] told him he would have to wait a week to get in. Patient reports body wide pain from his \"body having gone through a lot. \"  Patient then rambles on about light and Galaxy and the planets aligning and saving the world, very quiet fast speech that was difficult to hear. Patient denies suicidal or homicidal ideation. Patient denies fever, chills, URI symptoms, chest pain, dyspnea, abdominal pain, or other complaints. REVIEW OF SYSTEMS     Review of Systems   Constitutional:  Negative for chills and fever. HENT:  Negative for congestion and rhinorrhea. Eyes:  Negative for visual disturbance. Respiratory:  Negative for shortness of breath. Cardiovascular:  Negative for chest pain. Gastrointestinal:  Negative for abdominal pain, nausea and vomiting. Musculoskeletal:  Positive for myalgias. Negative for gait problem. Skin:  Negative for rash and wound. Neurological:  Negative for weakness and headaches. Psychiatric/Behavioral:  Negative for confusion and suicidal ideas. The patient is nervous/anxious.        PAST MEDICAL HISTORY    has a past medical history of Anxiety, Arthritis, Asthma, Bipolar I disorder, most recent episode (or current) depressed, unspecified, Clostridium difficile infection, COPD (chronic obstructive pulmonary

## 2023-06-06 NOTE — ED NOTES
[facility] [place] [facility] @ bedside. Pt not remaining in bed after multiple attempts at redirection.       Sasha Zamorano RN  06/05/23 8158

## 2023-06-06 NOTE — ED PROVIDER NOTES
[facility]  ED     Emergency Department     Faculty Attestation    I performed a history and physical examination of the patient and discussed management with the resident. I reviewed the residents note and agree with the documented findings and plan of care. Any areas of disagreement are noted on the chart. I was personally present for the key portions of any procedures. I have documented in the chart those procedures where I was not present during the key portions. I have reviewed the emergency nurses triage note. I agree with the chief complaint, past medical history, past surgical history, allergies, medications, social and family history as documented unless otherwise noted below. For Physician Assistant/ Nurse Practitioner cases/documentation I have personally evaluated this patient and have completed at least one if not all key elements of the E/M (history, physical exam, and MDM). Additional findings are as noted. 5:49 PM EDT    Patient brought in by EMS after he was found being agitated in the middle of the street. EMS states that as soon as he was lying down on the cot, he became drowsy and sleepy. Patient does have a history of opiate use. On arrival here, patient is drowsy but easily arousable and does become somewhat agitated. When he is drowsy his oxygen saturation was dropping down into the 70s but come back up once he was aroused. Patient denies any injuries or pain. Will observe patient and administer Narcan as needed.       Doris Muller MD  Attending Emergency  Physician            Keyla Byrne MD  06/05/23 7274
[facility] ED  Emergency Department Encounter  Emergency Medicine Resident     Pt Bonifacio Steve  MRN: [id number]  [place] 1990  Date of evaluation: 6/5/23  PCP:  No primary care provider on file. Note Started: 8:05 PM EDT      CHIEF COMPLAINT       Chief Complaint   Patient presents with    Shortness of Breath       HISTORY OF PRESENT ILLNESS  (Location/Symptom, Timing/Onset, Context/Setting, Quality, Duration, Modifying Factors, Severity.)      Ni Thorne is a 28 y.o. male who presents via first responders after being found agitated on the side of the road. As per report, patient had been agitated when they arrived on scene, becoming progressively more somnolent, unsure if patient ingested/is intoxicated. Patient is well-known to the emergency department, has multiple visits, sometimes multiple visits daily, with similar presentation. Patient does have history of schizoaffective disorder, lacey, hallucinations. Patient is agitated, difficult to reason with, with episodes of somnolence when not being agitated. Patient denies ingestion, denies injury, states that he has pruritic all throughout his body. Denies nausea/vomiting, specifically denies head injury/headache, denies visual changes. Patient denies suicidal ideation, homicidal ideation. Denies self-harm.     PAST MEDICAL / SURGICAL / SOCIAL / FAMILY HISTORY      has a past medical history of Anxiety, Arthritis, Asthma, Bipolar I disorder, most recent episode (or current) depressed, unspecified, Clostridium difficile infection, COPD (chronic obstructive pulmonary disease) ([address] Depression, Disease of blood and blood forming organ, Eczema, Fracture, metacarpal, Gastric ulcer, Gastritis, GERD (gastroesophageal reflux disease), GI bleed, H. pylori infection, H/O blood clots, Head injury, Headache, Insomnia, Juvenile rheumatoid arthritis ([address] Neuromuscular disorder ([address] PFAPA syndrome ([address] PUD (peptic ulcer
of Benadryl. [CD]   1951 Patient currently resting in bed on 5 L nasal cannula. Continue to monitor [CD]      ED Course User Index  [CD] Maksim Hooks DO  [KJ] Marcos Ashraf MD       OUTSTANDING TASKS / RECOMMENDATIONS:    Reevaluate  Disposition     FINAL IMPRESSION:     1.  [facility])        DISPOSITION:         DISPOSITION:  [x]  Discharge   []  Transfer -    []  Admission -     []  Against Medical Advice   []  Eloped   FOLLOW-UP: [address] [id number] [phone]  Schedule an appointment as soon as possible for a visit in 3 days  For ER follow-up     DISCHARGE MEDICATIONS: New Prescriptions    No medications on file          Maksim Hooks DO  Emergency Medicine Resident  [facility]        Maksim Hooks [place]  Resident  06/05/23 1223

## 2023-06-06 NOTE — ED NOTES
Writer attempted to put patient on pulse ox and BP monitor.  Pt refusing      Melida Page RN  06/05/23 2026

## 2023-06-06 NOTE — DISCHARGE INSTRUCTIONS
Thank you for visiting [facility] Emergency Department. You are medically clear for discharge to [facility]    You need to call No primary care provider on file. to make an appointment as directed for follow up. Should you have any questions regarding your care or further treatment, please call [facility] at [phone].

## 2023-06-17 ENCOUNTER — HOSPITAL ENCOUNTER (INPATIENT)
Age: 33
LOS: 1 days | Discharge: LEFT BEFORE DISCHARGE | DRG: 918 | End: 2023-06-17
Attending: EMERGENCY MEDICINE | Admitting: STUDENT IN AN ORGANIZED HEALTH CARE EDUCATION/TRAINING PROGRAM
Payer: COMMERCIAL

## 2023-06-17 ENCOUNTER — APPOINTMENT (OUTPATIENT)
Dept: GENERAL RADIOLOGY | Age: 33
DRG: 918 | End: 2023-06-17
Payer: COMMERCIAL

## 2023-06-17 VITALS
TEMPERATURE: 98.2 F | OXYGEN SATURATION: 88 % | WEIGHT: 180 LBS | HEART RATE: 93 BPM | RESPIRATION RATE: 18 BRPM | DIASTOLIC BLOOD PRESSURE: 73 MMHG | HEIGHT: 67 IN | BODY MASS INDEX: 28.25 KG/M2 | SYSTOLIC BLOOD PRESSURE: 122 MMHG

## 2023-06-17 DIAGNOSIS — M62.82 NON-TRAUMATIC RHABDOMYOLYSIS: Primary | ICD-10-CM

## 2023-06-17 DIAGNOSIS — T50.904A OVERDOSE OF UNDETERMINED INTENT, INITIAL ENCOUNTER: ICD-10-CM

## 2023-06-17 PROBLEM — N17.9 AKI (ACUTE KIDNEY INJURY) (HCC): Status: ACTIVE | Noted: 2023-06-17

## 2023-06-17 PROBLEM — G93.40 ACUTE ENCEPHALOPATHY: Status: ACTIVE | Noted: 2023-06-17

## 2023-06-17 PROBLEM — E87.29 HIGH ANION GAP METABOLIC ACIDOSIS: Status: ACTIVE | Noted: 2023-06-17

## 2023-06-17 LAB
ALBUMIN SERPL-MCNC: 4.4 G/DL (ref 3.5–5.2)
ALBUMIN/GLOB SERPL: 1.6 {RATIO} (ref 1–2.5)
ALP SERPL-CCNC: 67 U/L (ref 40–129)
ALT SERPL-CCNC: 24 U/L (ref 5–41)
ANION GAP SERPL CALCULATED.3IONS-SCNC: 17 MMOL/L (ref 9–17)
AST SERPL-CCNC: 72 U/L
BASOPHILS # BLD: 0.05 K/UL (ref 0–0.2)
BASOPHILS NFR BLD: 0 % (ref 0–2)
BILIRUB DIRECT SERPL-MCNC: <0.1 MG/DL
BILIRUB INDIRECT SERPL-MCNC: ABNORMAL MG/DL (ref 0–1)
BILIRUB SERPL-MCNC: <0.1 MG/DL (ref 0.3–1.2)
BUN SERPL-MCNC: 21 MG/DL (ref 6–20)
CALCIUM SERPL-MCNC: 8.5 MG/DL (ref 8.6–10.4)
CHLORIDE SERPL-SCNC: 95 MMOL/L (ref 98–107)
CK SERPL-CCNC: 3116 U/L (ref 39–308)
CO2 SERPL-SCNC: 23 MMOL/L (ref 20–31)
CREAT SERPL-MCNC: 1.33 MG/DL (ref 0.7–1.2)
EOSINOPHIL # BLD: 0.1 K/UL (ref 0–0.44)
EOSINOPHILS RELATIVE PERCENT: 1 % (ref 1–4)
ERYTHROCYTE [DISTWIDTH] IN BLOOD BY AUTOMATED COUNT: 12.6 % (ref 11.8–14.4)
GFR SERPL CREATININE-BSD FRML MDRD: >60 ML/MIN/1.73M2
GLUCOSE BLD-MCNC: 75 MG/DL (ref 75–110)
GLUCOSE SERPL-MCNC: 107 MG/DL (ref 70–99)
HCT VFR BLD AUTO: 42.3 % (ref 40.7–50.3)
HGB BLD-MCNC: 13.6 G/DL (ref 13–17)
IMM GRANULOCYTES # BLD AUTO: 0.1 K/UL (ref 0–0.3)
IMM GRANULOCYTES NFR BLD: 1 %
LYMPHOCYTES # BLD: 4 % (ref 24–43)
LYMPHOCYTES NFR BLD: 0.76 K/UL (ref 1.1–3.7)
MAGNESIUM SERPL-MCNC: 2.3 MG/DL (ref 1.6–2.6)
MCH RBC QN AUTO: 30 PG (ref 25.2–33.5)
MCHC RBC AUTO-ENTMCNC: 32.2 G/DL (ref 28.4–34.8)
MCV RBC AUTO: 93.4 FL (ref 82.6–102.9)
MONOCYTES NFR BLD: 0.96 K/UL (ref 0.1–1.2)
MONOCYTES NFR BLD: 5 % (ref 3–12)
MYOGLOBIN SERPL-MCNC: ABNORMAL NG/ML (ref 28–72)
NEUTROPHILS NFR BLD: 89 % (ref 36–65)
NEUTS SEG NFR BLD: 16.09 K/UL (ref 1.5–8.1)
NRBC AUTOMATED: 0 PER 100 WBC
PHOSPHATE SERPL-MCNC: 7.3 MG/DL (ref 2.5–4.5)
PLATELET # BLD AUTO: 253 K/UL (ref 138–453)
PMV BLD AUTO: 9.5 FL (ref 8.1–13.5)
POTASSIUM SERPL-SCNC: 5.3 MMOL/L (ref 3.7–5.3)
PROT SERPL-MCNC: 7.2 G/DL (ref 6.4–8.3)
RBC # BLD AUTO: 4.53 M/UL (ref 4.21–5.77)
SODIUM SERPL-SCNC: 135 MMOL/L (ref 135–144)
TROPONIN I SERPL HS-MCNC: 55 NG/L (ref 0–22)
TROPONIN I SERPL HS-MCNC: 61 NG/L (ref 0–22)
TSH SERPL-MCNC: 2.43 UIU/ML (ref 0.3–5)
WBC OTHER # BLD: 18.1 K/UL (ref 3.5–11.3)

## 2023-06-17 PROCEDURE — 82550 ASSAY OF CK (CPK): CPT

## 2023-06-17 PROCEDURE — 73620 X-RAY EXAM OF FOOT: CPT

## 2023-06-17 PROCEDURE — 99285 EMERGENCY DEPT VISIT HI MDM: CPT

## 2023-06-17 PROCEDURE — 80048 BASIC METABOLIC PNL TOTAL CA: CPT

## 2023-06-17 PROCEDURE — 80076 HEPATIC FUNCTION PANEL: CPT

## 2023-06-17 PROCEDURE — 82947 ASSAY GLUCOSE BLOOD QUANT: CPT

## 2023-06-17 PROCEDURE — 83735 ASSAY OF MAGNESIUM: CPT

## 2023-06-17 PROCEDURE — 93005 ELECTROCARDIOGRAM TRACING: CPT | Performed by: EMERGENCY MEDICINE

## 2023-06-17 PROCEDURE — 6360000002 HC RX W HCPCS

## 2023-06-17 PROCEDURE — 2580000003 HC RX 258

## 2023-06-17 PROCEDURE — 73610 X-RAY EXAM OF ANKLE: CPT

## 2023-06-17 PROCEDURE — 83874 ASSAY OF MYOGLOBIN: CPT

## 2023-06-17 PROCEDURE — 2060000000 HC ICU INTERMEDIATE R&B

## 2023-06-17 PROCEDURE — 84443 ASSAY THYROID STIM HORMONE: CPT

## 2023-06-17 PROCEDURE — 73562 X-RAY EXAM OF KNEE 3: CPT

## 2023-06-17 PROCEDURE — 84100 ASSAY OF PHOSPHORUS: CPT

## 2023-06-17 PROCEDURE — 85027 COMPLETE CBC AUTOMATED: CPT

## 2023-06-17 PROCEDURE — 84484 ASSAY OF TROPONIN QUANT: CPT

## 2023-06-17 PROCEDURE — 73502 X-RAY EXAM HIP UNI 2-3 VIEWS: CPT

## 2023-06-17 PROCEDURE — 73590 X-RAY EXAM OF LOWER LEG: CPT

## 2023-06-17 RX ORDER — POTASSIUM CHLORIDE 7.45 MG/ML
10 INJECTION INTRAVENOUS PRN
Status: DISCONTINUED | OUTPATIENT
Start: 2023-06-17 | End: 2023-06-18 | Stop reason: HOSPADM

## 2023-06-17 RX ORDER — 0.9 % SODIUM CHLORIDE 0.9 %
1000 INTRAVENOUS SOLUTION INTRAVENOUS ONCE
Status: COMPLETED | OUTPATIENT
Start: 2023-06-17 | End: 2023-06-17

## 2023-06-17 RX ORDER — ACETAMINOPHEN 650 MG/1
650 SUPPOSITORY RECTAL EVERY 6 HOURS PRN
Status: DISCONTINUED | OUTPATIENT
Start: 2023-06-17 | End: 2023-06-18 | Stop reason: HOSPADM

## 2023-06-17 RX ORDER — POTASSIUM CHLORIDE 20 MEQ/1
40 TABLET, EXTENDED RELEASE ORAL PRN
Status: DISCONTINUED | OUTPATIENT
Start: 2023-06-17 | End: 2023-06-18 | Stop reason: HOSPADM

## 2023-06-17 RX ORDER — SODIUM CHLORIDE, SODIUM LACTATE, POTASSIUM CHLORIDE, CALCIUM CHLORIDE 600; 310; 30; 20 MG/100ML; MG/100ML; MG/100ML; MG/100ML
INJECTION, SOLUTION INTRAVENOUS CONTINUOUS
Status: DISCONTINUED | OUTPATIENT
Start: 2023-06-17 | End: 2023-06-18 | Stop reason: HOSPADM

## 2023-06-17 RX ORDER — SODIUM CHLORIDE 9 MG/ML
INJECTION, SOLUTION INTRAVENOUS PRN
Status: DISCONTINUED | OUTPATIENT
Start: 2023-06-17 | End: 2023-06-18 | Stop reason: HOSPADM

## 2023-06-17 RX ORDER — SODIUM CHLORIDE 0.9 % (FLUSH) 0.9 %
5-40 SYRINGE (ML) INJECTION EVERY 12 HOURS SCHEDULED
Status: DISCONTINUED | OUTPATIENT
Start: 2023-06-17 | End: 2023-06-18 | Stop reason: HOSPADM

## 2023-06-17 RX ORDER — HEPARIN SODIUM 5000 [USP'U]/ML
5000 INJECTION, SOLUTION INTRAVENOUS; SUBCUTANEOUS EVERY 8 HOURS SCHEDULED
Status: DISCONTINUED | OUTPATIENT
Start: 2023-06-17 | End: 2023-06-18 | Stop reason: HOSPADM

## 2023-06-17 RX ORDER — POLYETHYLENE GLYCOL 3350 17 G/17G
17 POWDER, FOR SOLUTION ORAL DAILY PRN
Status: DISCONTINUED | OUTPATIENT
Start: 2023-06-17 | End: 2023-06-18 | Stop reason: HOSPADM

## 2023-06-17 RX ORDER — ACETAMINOPHEN 325 MG/1
650 TABLET ORAL EVERY 6 HOURS PRN
Status: DISCONTINUED | OUTPATIENT
Start: 2023-06-17 | End: 2023-06-18 | Stop reason: HOSPADM

## 2023-06-17 RX ORDER — KETOROLAC TROMETHAMINE 30 MG/ML
30 INJECTION, SOLUTION INTRAMUSCULAR; INTRAVENOUS ONCE
Status: COMPLETED | OUTPATIENT
Start: 2023-06-17 | End: 2023-06-17

## 2023-06-17 RX ORDER — MAGNESIUM SULFATE 1 G/100ML
1000 INJECTION INTRAVENOUS PRN
Status: DISCONTINUED | OUTPATIENT
Start: 2023-06-17 | End: 2023-06-18 | Stop reason: HOSPADM

## 2023-06-17 RX ORDER — SODIUM CHLORIDE 0.9 % (FLUSH) 0.9 %
10 SYRINGE (ML) INJECTION PRN
Status: DISCONTINUED | OUTPATIENT
Start: 2023-06-17 | End: 2023-06-18 | Stop reason: HOSPADM

## 2023-06-17 RX ADMIN — SODIUM CHLORIDE 1000 ML: 9 INJECTION, SOLUTION INTRAVENOUS at 18:28

## 2023-06-17 RX ADMIN — KETOROLAC TROMETHAMINE 30 MG: 30 INJECTION, SOLUTION INTRAMUSCULAR; INTRAVENOUS at 16:31

## 2023-06-17 ASSESSMENT — ENCOUNTER SYMPTOMS
SHORTNESS OF BREATH: 0
ABDOMINAL PAIN: 0

## 2023-06-17 NOTE — ED NOTES
Pt resting comfortably and in no acute distress. Respirations even and nonlabored. Patient denies any needs at this time. Bed in lowest position. Call light within reach. Will continue to monitor.         Alana Gabriel, [facility]  06/17/23 9055

## 2023-06-17 NOTE — ED NOTES
Labeled blood specimens sent to lab via tube system.     [x] Lavender   [] on ice   [x] Blue   [x] Green/yellow  [x] Green/black [] on ice  [] Pink  [x] Red  [] Yellow  [] on ice  [] Blood Cultures      Chelsea Everett RN  06/17/23 6253

## 2023-06-17 NOTE — CONSULTS
ORTHOPAEDIC SURGERY   CONSULT NOTE  (Dr. Maritza Marks)    CC/Reason for Consult:    Right leg pain; Distal femoral and Distal tibial bony lesions    HPI     28 y.o. male presented to the Emergency Department for evaluation after being found down at his [facility] facility. He wasn't responding and was later found to have fentanyl and cocaine in his system. Narcan was administered on scene and he was brought to consciousness. He was escorted to the ED for further evaluation and found to have myoglobin 13,540 and CK of 3,116. He's being admitted for PHI and rhabdomyolysis. Orthopaedics was consulted because there was reported right leg pain. An incidental bony growth was found on radiographic imaging, with a radiologist differential of bone infarct versus enchondroma. Of note, the patient has an extensive psychological history, with multiple documented visits with suicidal ideation and violence. The patient currently denies any pain or discomfort about his RLE. He is refusing any examination. He refuses to stand up and demonstrate an ability to bear weight. When asked about his charted history of RA and blood clots or anticoagulation, the patient is unaware and continues to insist that he's fine and just wants to leave and go to work Monday. Patient denies any numbness, burning, tingling sensations. He denies using any assistance devices at baseline to ambulate. Officer present during entire evaluation.     Past Medical Hx:    has a past medical history of Anxiety, Arthritis, Asthma, Bipolar I disorder, most recent episode (or current) depressed, unspecified, Clostridium difficile infection, COPD (chronic obstructive pulmonary disease) ([address] Depression, Disease of blood and blood forming organ, Eczema, Fracture, metacarpal, Gastric ulcer, Gastritis, GERD (gastroesophageal reflux disease), GI bleed, H. pylori infection, H/O blood clots, Head injury, Headache, Insomnia, Juvenile rheumatoid arthritis ([address] Neuromuscular

## 2023-06-17 NOTE — ED NOTES
Pt trying to take out his IV stating \"I feel better, I am fine. \"   Dr Hai Lee and Dr Gregory Reach at bedside speaking to pt. Writer able to assist pt back to bed and begin fluids as ordered.       [facility]  06/17/23 [id number]

## 2023-06-17 NOTE — ED NOTES
ED to inpatient nurses report      Chief Complaint:  Chief Complaint   Patient presents with    Drug Overdose     Was in long term, in a cell this AM. Pt began \"nodding out\", was given 4 mg Narcan. Pt refusing IV     Present to ED from: [place]    MOA:     LOC: alert and orientated to name, place, date  Mobility: Requires assistance * 1  Oxygen Baseline: RA    Current needs required: UA, Xray, Pt refusing care    Pending ED orders: xray, UA   Present condition: Alert, long term guards at bedside, verbally agressive with staff, requesting to leave. Unable to walk at this time. Why did the patient come to the ED? Found down prolonged period of time in cell, 4mg IN narcan given, pt responded   What is the plan? Admission   Any procedures or intervention occur?  Xrays, trops elevated, intermed admission     Mental Status:  Level of Consciousness: Alert (0)    Psych Assessment:   Psychosocial  Psychosocial (WDL): (S) Exceptions to WDL (Overdose, received 4 mg Narcan IN)  Vital signs   Vitals:    06/17/23 1550 06/17/23 1552   BP:  136/87   Pulse:  91   Resp:  18   Temp:  97.6 °F (36.4 °C)   TempSrc:  Oral   SpO2:  96%   Weight: 180 lb (81.6 kg)    Height: 5' 7\" (1.702 m)         Vitals:  Patient Vitals for the past 24 hrs:   BP Temp Temp src Pulse Resp SpO2 Height Weight   06/17/23 1552 136/87 97.6 °F (36.4 °C) Oral 91 18 96 % -- --   06/17/23 1550 -- -- -- -- -- -- 5' 7\" (1.702 m) 180 lb (81.6 kg)      Visit Vitals  /87   Pulse 91   Temp 97.6 °F (36.4 °C) (Oral)   Resp 18   Ht 5' 7\" (1.702 m)   Wt 180 lb (81.6 kg)   SpO2 96%   BMI 28.19 kg/m²        LDAs:   Peripheral IV 06/17/23 Right Antecubital (Active)   Site Assessment Clean, dry & intact 06/17/23 1633   Line Status Blood return noted;Specimen collected 06/17/23 1633   Line Care Chlorhexidine wipes 06/17/23 1633   Phlebitis Assessment No symptoms 06/17/23 1633   Infiltration Assessment 0 06/17/23 1633       Ambulatory Status:  Presents to emergency department  because

## 2023-06-17 NOTE — ED NOTES
Report received from [facility], RN     Pt refusing xray stating \"I'm fine\"   Pt asked for urine specimen but refused. Pt ripping out IV, stating \"I'm ready to go\"     Guards @ bedside, pt denies other needs. Admitting service to be notified. Call light in reach, white board updated.        Rey Alatorre RN  06/17/23 1929

## 2023-06-17 NOTE — ED PROVIDER NOTES
[address] ED  Emergency Department Encounter  Emergency Medicine Resident     Pt Elle Deja William  MRN: [id number]  [place] 1990  Date of evaluation: 6/17/23  PCP:  No primary care provider on file. Note Started: 4:50 PM EDT      CHIEF COMPLAINT       Chief Complaint   Patient presents with    Drug Overdose     Was in retirement, in a cell this AM. Pt began \"nodding out\", was given 4 mg Narcan. Pt refusing IV       HISTORY OF PRESENT ILLNESS  (Location/Symptom, Timing/Onset, Context/Setting, Quality, Duration, Modifying Factors, Severity.)      Miky Walker is a 28 y.o. male who presents via EMS for altered mental status and hypoglycemia. Patient is incarcerated out of detaining facility, does have work release [is allowed some liberties to leave during the day to go for work]. As per EMS report,  report as well, patient was found down in his cell, minimally responsive. He did receive 4 of IN Narcan at the facility. On EMS arrival, was noted to be hypoglycemic to 63. Patient is complaining of some tenderness along right thigh. Will not allow examination of thigh. On arrival here blood sugar is 75, patient refusing medical care, is alert and oriented x3 and appears to have decision-making capacity.     PAST MEDICAL / SURGICAL / SOCIAL / FAMILY HISTORY      has a past medical history of Anxiety, Arthritis, Asthma, Bipolar I disorder, most recent episode (or current) depressed, unspecified, Clostridium difficile infection, COPD (chronic obstructive pulmonary disease) ([address] Depression, Disease of blood and blood forming organ, Eczema, Fracture, metacarpal, Gastric ulcer, Gastritis, GERD (gastroesophageal reflux disease), GI bleed, H. pylori infection, H/O blood clots, Head injury, Headache, Insomnia, Juvenile rheumatoid arthritis ([address] Neuromuscular disorder ([address] PFAPA syndrome ([address] PUD (peptic ulcer disease), Rheumatoid arthritis ([address] Rheumatoid

## 2023-06-17 NOTE — ED NOTES
Pt arrived to ED alert and oriented x4 via EMS. Pt to ED s/p overdose. Per EMS, pt was in a cell and started \"nodding off\". EMS reported that the supervisor stated \"he's breathing so he's fine\". EMS reported that pt was then found unresponsive in a curled up position, stated pt had been in that position for approx 4 hours. Pt was given 4 mg Narcan IN with response. EMS reported that pt had a \"confirmed Fentanyl test\" at the [facility]. On arrival, pt drinking juice. Pt had glucose of 63 PTA. Pt c/o leg pain, refusing assessment of legs. Pt refusing IV at this time. Pt denies having been around anyone suspected to have COVID-19 or anyone that has been sick, denies recent travel outside the Pending sale to [facility] or [address]. Pt placed on continuous pulse ox and BP cuff. RR even and unlabored. NAD noted. Whiteboard updated. Will continue with plan of care.      Chu Craig RN  06/17/23 212

## 2023-06-17 NOTE — ED NOTES
Pt arrived via EMS. He was found unresponsive in his cell and was given 4 of narcan IN. Pt states \"I just want to feel better and that is what I have been trying to do. \" Atypical Melanocytic Proliferation, Favoring Melanoma Histology Text: One or more of the following is visualized: variable confluence of melanocytes, nests of melanocytes within the epidermis, focal pagetoid spread of melanocytes, adnexal spread/extension, and/or focal large atypical melanocytes.

## 2023-06-17 NOTE — ED NOTES
Pt requesting to see RN, writer to bedside. Pt requesting to be DC'ed, stating \"I feel better. I wanna be DC'ed\". Dr. Robson Huerta notified and at bedside.      Jarod Jorge RN  06/17/23 7952

## 2023-06-17 NOTE — DISCHARGE INSTRUCTIONS
The patient was seen in the emergency department following an overdose, suspected to be fentanyl and cocaine. He arrived to the department alert and oriented, refusing medical care, not in distress. The patient is being discharged given his refusal of medical care and the fact that he retains capacity to make medical decisions at this time. Return to the emergency department should the patient have any altered mental status, difficulty breathing, rash, new pain, or any other acute concern.

## 2023-06-17 NOTE — ED PROVIDER NOTES
[facility] ED     Emergency Department     Faculty Attestation    I performed a history and physical examination of the patient and discussed management with the resident. I reviewed the residents note and agree with the documented findings and plan of care. Any areas of disagreement are noted on the chart. I was personally present for the key portions of any procedures. I have documented in the chart those procedures where I was not present during the key portions. I have reviewed the emergency nurses triage note. I agree with the chief complaint, past medical history, past surgical history, allergies, medications, social and family history as documented unless otherwise noted below. For Physician Assistant/ Nurse Practitioner cases/documentation I have personally evaluated this patient and have completed at least one if not all key elements of the E/M (history, physical exam, and MDM). Additional findings are as noted. 3:55 PM EDT    [facility] in from prison after he was found unresponsive in his cell. Patient was given 4 mg of Narcan IN and became responsive. Patient is well-known to our emergency department and I personally saw him on his last visit here 12 days ago. Patient has a history of drug abuse as well as schizophrenia. On arrival here, patient is alert and answering my questions. He is denying any pain or complaints at this time. Lungs are clear to auscultation bilaterally and heart sounds are normal.  Vitals are all within normal limits. We will plan to observe patient here and plan to discharge back to prison.     EKG Interpretation    Interpreted by emergency department physician    Rhythm: normal sinus   Rate: normal  Axis: normal  Ectopy: none  Conduction: normal  ST Segments: elevation in  multiple, no reciprocal changes  T Waves: non specific changes  Q Waves: nonspecific    Clinical Impression: non-specific EKG    MD Sami Win MD  Attending

## 2023-06-17 NOTE — ED NOTES
Pt was stating \"my leg hurts too much to get back into bed. \"   Pt was carefully moved back into bed after pain medication was given. Pt resting comfortably. Respirations even and nonlabored. Patient denies any needs at this time. Bed in lowest position. Call light within reach. Will continue to monitor.         [place] Nurse, [facility]  06/17/23 8955

## 2023-06-19 LAB
EKG ATRIAL RATE: 88 BPM
EKG P AXIS: 60 DEGREES
EKG P-R INTERVAL: 136 MS
EKG Q-T INTERVAL: 336 MS
EKG QRS DURATION: 80 MS
EKG QTC CALCULATION (BAZETT): 406 MS
EKG R AXIS: 55 DEGREES
EKG T AXIS: 41 DEGREES
EKG VENTRICULAR RATE: 88 BPM

## 2023-12-16 ENCOUNTER — HOSPITAL ENCOUNTER (EMERGENCY)
Age: 33
Discharge: LEFT BEFORE DISCHARGE | End: 2023-12-16
Attending: STUDENT IN AN ORGANIZED HEALTH CARE EDUCATION/TRAINING PROGRAM

## 2023-12-16 VITALS
SYSTOLIC BLOOD PRESSURE: 128 MMHG | TEMPERATURE: 97.3 F | HEART RATE: 96 BPM | BODY MASS INDEX: 25.9 KG/M2 | DIASTOLIC BLOOD PRESSURE: 83 MMHG | WEIGHT: 165 LBS | RESPIRATION RATE: 14 BRPM | HEIGHT: 67 IN

## 2023-12-16 DIAGNOSIS — M79.641 PAIN IN BOTH HANDS: Primary | ICD-10-CM

## 2023-12-16 DIAGNOSIS — T69.9XXA COLD EXPOSURE, INITIAL ENCOUNTER: ICD-10-CM

## 2023-12-16 DIAGNOSIS — M79.642 PAIN IN BOTH HANDS: Primary | ICD-10-CM

## 2023-12-16 DIAGNOSIS — Z59.00 HOMELESSNESS: ICD-10-CM

## 2023-12-16 SDOH — ECONOMIC STABILITY - HOUSING INSECURITY: HOMELESSNESS UNSPECIFIED: Z59.00

## 2023-12-16 NOTE — ED TRIAGE NOTES
Pt walked into ED with c/o pain to bilat hands. Denies injury. Hands are cool to touch. States has tingling. Dr Trace Mortensen talking with patient about putting hands under warm running water to warm them up, pt refused. Pt decided to leave against medical advice while triaging patient. Paperwork signed, Dr Trace Mortensen aware.  Pt left in stable condition

## 2023-12-16 NOTE — ED PROVIDER NOTES
EMERGENCY DEPARTMENT ENCOUNTER    Pt Name: Capri Farrell  MRN: [id number]  [address] [facility] [place] 1990  Date of evaluation: 12/16/23  CHIEF COMPLAINT     No chief complaint on file. HISTORY OF PRESENT ILLNESS   70-year-old presenting with concern for hand pain    Patient has a history of bipolar, homelessness and, very frequent ER presentations    States his bilateral hands hurt    He has been outside in the cold    No trauma. Did not punch anything. No numbness tingling. Just some burning to bilateral hand              REVIEW OF SYSTEMS     Review of Systems   Constitutional:  Negative for chills and fever. HENT:  Negative for rhinorrhea and sore throat. Eyes:  Negative for discharge and redness. Respiratory:  Negative for chest tightness and shortness of breath. Cardiovascular:  Negative for chest pain. Gastrointestinal:  Negative for abdominal pain, diarrhea, nausea and vomiting. Genitourinary:  Negative for dysuria and frequency. Musculoskeletal:  Negative for arthralgias and myalgias. Bilateral hand pain   Skin:  Negative for rash. Neurological:  Negative for weakness and numbness. Psychiatric/Behavioral:  Negative for suicidal ideas. All other systems reviewed and are negative.     PASTMEDICAL HISTORY     Past Medical History:   Diagnosis Date    Anxiety     Arthritis     Asthma     Bipolar I disorder, most recent episode (or current) depressed, unspecified 9/12/2014    Clostridium difficile infection     COPD (chronic obstructive pulmonary disease) ([address])     Depression     Disease of blood and blood forming organ     Eczema     Fracture, metacarpal     R 4th and 5th    Gastric ulcer     Gastritis 06/13/2018    GERD (gastroesophageal reflux disease)     GI bleed     H. 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HCC)     PUD (peptic ulcer disease) abdominal tenderness. Musculoskeletal:         General: No swelling or deformity. Cervical back: Normal range of motion. Comments: Bilateral hands are cool to the touch    2+ pulses sensation intact    No evidence of trauma   Skin:     General: Skin is warm. Findings: No rash. Neurological:      General: No focal deficit present. Mental Status: He is alert and oriented to person, place, and time. Psychiatric:         Mood and Affect: Mood normal.         MEDICAL DECISION MAKING / ED COURSE:     68-year-old presenting with bilateral hand pain    He is homeless and is on the call    He has cool hands to the touch    Suspect cold exposure    I did recommend warming the hands as well as pain control    He refuses he wants to go home    He understands the risk of leaving including losing his hands and dying    He is alert and oriented and of sound mind not suicidal or homicidal    He is electing to go ahead and sign out [facility]    The patient has decided to leave against medical advice and is refusing all further workup and testing. The patient has normal mental status and adequate capacity to make medical decisions and has the capacity to make decisions. The patient refuses further testing and or admission and wants to be discharged. The risks have been explained to the patient, including worsening illness, chronic pain, permanent disability, loss of organs, and death. The benefits of further testing and or admission have also been explained, including the availability and proximity of nurses, physicians, monitoring, diagnostic testing, and treatment. The patient was able to understand and state the risks and benefits of further testing and or hospital admission. This was witnessed by the nurse and me. The patient had the opportunity to ask questions about medical conditions.   The patient was treated to the extent that the patient allowed, and knows that may return for

## 2023-12-17 ENCOUNTER — HOSPITAL ENCOUNTER (EMERGENCY)
Age: 33
Discharge: HOME | End: 2023-12-17
Attending: EMERGENCY MEDICINE
Payer: MEDICARE

## 2023-12-17 VITALS
RESPIRATION RATE: 21 BRPM | TEMPERATURE: 97.9 F | SYSTOLIC BLOOD PRESSURE: 145 MMHG | HEART RATE: 113 BPM | DIASTOLIC BLOOD PRESSURE: 89 MMHG | OXYGEN SATURATION: 100 %

## 2023-12-17 DIAGNOSIS — Z59.00 HOMELESSNESS: Primary | ICD-10-CM

## 2023-12-17 PROCEDURE — 99282 EMERGENCY DEPT VISIT SF MDM: CPT

## 2023-12-17 SDOH — ECONOMIC STABILITY - HOUSING INSECURITY: HOMELESSNESS UNSPECIFIED: Z59.00

## 2023-12-17 NOTE — ED TRIAGE NOTES
RN attempted to triage pt, pt had to be called multiple times before responding to RN. Once back in hallway, pt began to talk about and eye exam that someone was giving him in the lobby for the [place]'. Pt would not allow RN to obtain vitals or ask triage questions at this time.  Pt voiced that he needed to go back out to the lobby to be able to finish his eye exam.

## 2023-12-22 ENCOUNTER — HOSPITAL ENCOUNTER (EMERGENCY)
Age: 33
Discharge: HOME | End: 2023-12-22
Attending: EMERGENCY MEDICINE
Payer: COMMERCIAL

## 2023-12-22 VITALS
OXYGEN SATURATION: 100 % | HEART RATE: 100 BPM | DIASTOLIC BLOOD PRESSURE: 81 MMHG | TEMPERATURE: 97.3 F | RESPIRATION RATE: 20 BRPM | SYSTOLIC BLOOD PRESSURE: 130 MMHG

## 2023-12-22 DIAGNOSIS — M79.641 PAIN OF RIGHT HAND: Primary | ICD-10-CM

## 2023-12-22 PROCEDURE — 99282 EMERGENCY DEPT VISIT SF MDM: CPT | Performed by: EMERGENCY MEDICINE

## 2023-12-22 RX ORDER — ACETAMINOPHEN 500 MG
1000 TABLET ORAL ONCE
Status: DISCONTINUED | OUTPATIENT
Start: 2023-12-22 | End: 2023-12-22 | Stop reason: HOSPADM

## 2023-12-22 NOTE — ED NOTES
Patient presents to the ED through triage with c/o of bilateral hand and foot pain. Patient was seen earlier with similar symptoms. He notes that his hands are \"probably too cold. \" He denies any trauma or injury to his hands or feet. Patient denies numbness or tingling. Upon assessment, hands are cold and red to touch. Patient is alert and oriented X4. Vitals obtained.

## 2023-12-22 NOTE — ED PROVIDER NOTES
[address] [place] [address] ED  Emergency Department Encounter  Emergency Medicine Resident     Pt [place] Jessica Rocha  MRN: [id number]  [address] [facility] 1990  Date of evaluation: 12/22/23  PCP:  No primary care provider on file. Note Started: 2:18 AM EST      CHIEF COMPLAINT       No chief complaint on file. HISTORY OF PRESENT ILLNESS  (Location/Symptom, Timing/Onset, Context/Setting, Quality, Duration, Modifying Factors, Severity.)      Gerry Boyce is a 35 y.o. male who is very well-known to the emergency department presents with right hand pain. Patient states his right hand has been hurting for months, denies any injury states that is more of a chronic pain. Rates his pain as 9/10, however has full range of motion. Denies any fever, chills, nausea, vomiting, chest pain, shortness of breath. Has not taken any medications for his symptoms. Patient is requesting Klonopin or Flexeril for his symptoms. PAST MEDICAL / SURGICAL / SOCIAL / FAMILY HISTORY      has a past medical history of Anxiety, Arthritis, Asthma, Bipolar I disorder, most recent episode (or current) depressed, unspecified, Clostridium difficile infection, COPD (chronic obstructive pulmonary disease) ([address]), Depression, Disease of blood and blood forming organ, Eczema, Fracture, metacarpal, Gastric ulcer, Gastritis, GERD (gastroesophageal reflux disease), GI bleed, H. 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        has a past surgical history that includes Colonoscopy; Iodine, Metronidazole, Mirtazapine, Ondansetron hcl, Promethazine, and Ziprasidone    Home Medications:  Prior to Admission medications    Medication Sig Start Date End Date Taking? Authorizing Provider   metFORMIN (GLUCOPHAGE) 500 MG tablet Take 1 tablet by mouth 2 times daily (with meals) Indications: last dispensed 1/6/23 5/28/23 6/27/23  Wanda Luis Eduardo, DO   QUEtiapine (SEROQUEL) 100 MG tablet Take 1 tablet by mouth at bedtime 5/28/23 6/27/23  Ginger Luis Eduardo, DO   diclofenac sodium (VOLTAREN) 1 % GEL Apply 4 g topically 4 times daily 5/26/23   Flaco Moran,    buprenorphine-naloxone (SUBOXONE) 8-2 MG FILM SL film  3/16/23   ProviderValeria MD   carBAMazepine (TEGRETOL) 200 MG tablet  3/13/23   Provider, MD Valeria   cloNIDine (CATAPRES) 0.1 MG tablet  3/13/23   ProviderValeria MD   gabapentin (NEURONTIN) 300 MG capsule  3/13/23   Provider, MD Valeria   naloxone 4 MG/0.1ML LIQD nasal spray  3/13/23   Provider, MD Valeria   QUEtiapine (SEROQUEL) 100 MG tablet  3/13/23   Provider, MD Valeria   Benzocaine-Menthol (CEPACOL) 6-10 MG LOZG lozenge Take 1 lozenge by mouth every 2 hours as needed for Sore Throat 4/24/23   Benny Hatch DO   sertraline (ZOLOFT) 25 MG tablet Take 1 tablet by mouth daily 2/2/23   Karel Alexander APRN - CNP   cyclobenzaprine (FLEXERIL) 10 MG tablet Take 1 tablet by mouth 3 times daily as needed for Muscle spasms    Provider, MD Valeria   traZODone (DESYREL) 50 MG tablet Take 1 tablet by mouth nightly as needed for Sleep    ProviderValeria MD   meloxicam (MOBIC) 7.5 MG tablet Take 1 tablet by mouth 2 times daily as needed for Pain 8/19/21 10/30/21  MASSIEL Santamaria - CNP         REVIEW OF SYSTEMS       Review of Systems   Constitutional:  Negative for chills and fever. Gastrointestinal:  Negative for nausea and vomiting. Neurological:  Negative for dizziness and weakness.        PHYSICAL EXAM      INITIAL VITALS:   /81   Pulse

## 2023-12-22 NOTE — DISCHARGE INSTRUCTIONS
Seen in the emergency department for hand pain. We gave you Tylenol. Please follow-up with your primary care provider within 1 to 2 days of discharge. PLEASE RETURN TO THE EMERGENCY DEPARTMENT IMMEDIATELY for worsening of pain, decrease sensation to arms or legs, inability to move arms or legs, shortness of breath, severe chest pain, excessive nausea or vomiting, notice any bruising to your abdomen or have increase in abdominal pain, or if you develop any concerning symptoms such as: high fever not relieved by acetaminophen (Tylenol) and/or ibuprofen (Motrin / Advil), chills, feeling of your heart fluttering or racing, persistent nausea and/or vomiting, vomiting up blood, blood in your stool, loss of consciousness, numbness, weakness or tingling in the arms or legs or change in color of the extremities, changes in mental status, persistent headache, blurry vision, loss of bladder / bowel control, unable to follow up with your physician, or other any other care or concern.

## 2023-12-24 ENCOUNTER — HOSPITAL ENCOUNTER (EMERGENCY)
Age: 33
Discharge: HOME | End: 2023-12-24
Attending: EMERGENCY MEDICINE
Payer: COMMERCIAL

## 2023-12-24 VITALS
RESPIRATION RATE: 16 BRPM | OXYGEN SATURATION: 92 % | HEART RATE: 87 BPM | SYSTOLIC BLOOD PRESSURE: 100 MMHG | TEMPERATURE: 97.6 F | DIASTOLIC BLOOD PRESSURE: 64 MMHG

## 2023-12-24 DIAGNOSIS — T40.601A OPIATE OVERDOSE, ACCIDENTAL OR UNINTENTIONAL, INITIAL ENCOUNTER (HCC): Primary | ICD-10-CM

## 2023-12-24 LAB
ALBUMIN SERPL-MCNC: 3.6 G/DL (ref 3.5–5.2)
ALBUMIN/GLOB SERPL: 1.6 {RATIO} (ref 1–2.5)
ALP SERPL-CCNC: 48 U/L (ref 40–129)
ALT SERPL-CCNC: 13 U/L (ref 5–41)
AMPHET UR QL SCN: NEGATIVE
ANION GAP SERPL CALCULATED.3IONS-SCNC: 15 MMOL/L (ref 9–17)
APAP SERPL-MCNC: <5 UG/ML (ref 10–30)
AST SERPL-CCNC: 28 U/L
BARBITURATES UR QL SCN: NEGATIVE
BASOPHILS # BLD: 0.03 K/UL (ref 0–0.2)
BASOPHILS NFR BLD: 0 % (ref 0–2)
BENZODIAZ UR QL: NEGATIVE
BILIRUB SERPL-MCNC: 0.2 MG/DL (ref 0.3–1.2)
BUN SERPL-MCNC: 9 MG/DL (ref 6–20)
CALCIUM SERPL-MCNC: 8.2 MG/DL (ref 8.6–10.4)
CANNABINOIDS UR QL SCN: NEGATIVE
CHLORIDE SERPL-SCNC: 99 MMOL/L (ref 98–107)
CO2 SERPL-SCNC: 23 MMOL/L (ref 20–31)
COCAINE UR QL SCN: NEGATIVE
CREAT SERPL-MCNC: 1 MG/DL (ref 0.7–1.2)
EOSINOPHIL # BLD: 0.14 K/UL (ref 0–0.44)
EOSINOPHILS RELATIVE PERCENT: 2 % (ref 1–4)
ERYTHROCYTE [DISTWIDTH] IN BLOOD BY AUTOMATED COUNT: 13.4 % (ref 11.8–14.4)
ETHANOL PERCENT: <0.01 %
ETHANOLAMINE SERPL-MCNC: <10 MG/DL
FENTANYL UR QL: POSITIVE
GFR SERPL CREATININE-BSD FRML MDRD: >60 ML/MIN/1.73M2
GLUCOSE SERPL-MCNC: 222 MG/DL (ref 70–99)
HCT VFR BLD AUTO: 34.1 % (ref 40.7–50.3)
HGB BLD-MCNC: 11.1 G/DL (ref 13–17)
IMM GRANULOCYTES # BLD AUTO: <0.03 K/UL (ref 0–0.3)
IMM GRANULOCYTES NFR BLD: 0 %
LYMPHOCYTES NFR BLD: 2.75 K/UL (ref 1.1–3.7)
LYMPHOCYTES RELATIVE PERCENT: 37 % (ref 24–43)
MCH RBC QN AUTO: 30.2 PG (ref 25.2–33.5)
MCHC RBC AUTO-ENTMCNC: 32.6 G/DL (ref 28.4–34.8)
MCV RBC AUTO: 92.7 FL (ref 82.6–102.9)
METHADONE UR QL: NEGATIVE
MONOCYTES NFR BLD: 0.79 K/UL (ref 0.1–1.2)
MONOCYTES NFR BLD: 11 % (ref 3–12)
NEUTROPHILS NFR BLD: 50 % (ref 36–65)
NEUTS SEG NFR BLD: 3.73 K/UL (ref 1.5–8.1)
NRBC BLD-RTO: 0 PER 100 WBC
OPIATES UR QL SCN: POSITIVE
OXYCODONE UR QL SCN: NEGATIVE
PCP UR QL SCN: NEGATIVE
PLATELET # BLD AUTO: 273 K/UL (ref 138–453)
PMV BLD AUTO: 9.9 FL (ref 8.1–13.5)
POTASSIUM SERPL-SCNC: 2.7 MMOL/L (ref 3.7–5.3)
PROT SERPL-MCNC: 5.9 G/DL (ref 6.4–8.3)
RBC # BLD AUTO: 3.68 M/UL (ref 4.21–5.77)
SALICYLATES SERPL-MCNC: <1 MG/DL (ref 3–10)
SODIUM SERPL-SCNC: 137 MMOL/L (ref 135–144)
TEST INFORMATION: ABNORMAL
TOXIC TRICYCLIC SC,BLOOD: NEGATIVE
WBC OTHER # BLD: 7.5 K/UL (ref 3.5–11.3)

## 2023-12-24 PROCEDURE — 6360000002 HC RX W HCPCS

## 2023-12-24 PROCEDURE — G0480 DRUG TEST DEF 1-7 CLASSES: HCPCS

## 2023-12-24 PROCEDURE — 93005 ELECTROCARDIOGRAM TRACING: CPT

## 2023-12-24 PROCEDURE — 99285 EMERGENCY DEPT VISIT HI MDM: CPT | Performed by: EMERGENCY MEDICINE

## 2023-12-24 PROCEDURE — 6370000000 HC RX 637 (ALT 250 FOR IP)

## 2023-12-24 PROCEDURE — 80053 COMPREHEN METABOLIC PANEL: CPT

## 2023-12-24 PROCEDURE — 85025 COMPLETE CBC W/AUTO DIFF WBC: CPT

## 2023-12-24 PROCEDURE — 96365 THER/PROPH/DIAG IV INF INIT: CPT | Performed by: EMERGENCY MEDICINE

## 2023-12-24 PROCEDURE — 80307 DRUG TEST PRSMV CHEM ANLYZR: CPT

## 2023-12-24 PROCEDURE — 80179 DRUG ASSAY SALICYLATE: CPT

## 2023-12-24 PROCEDURE — 80143 DRUG ASSAY ACETAMINOPHEN: CPT

## 2023-12-24 RX ORDER — NALOXONE HYDROCHLORIDE 4 MG/.1ML
1 SPRAY NASAL ONCE
Status: COMPLETED | OUTPATIENT
Start: 2023-12-24 | End: 2023-12-24

## 2023-12-24 RX ORDER — POTASSIUM CHLORIDE 7.45 MG/ML
10 INJECTION INTRAVENOUS ONCE
Status: COMPLETED | OUTPATIENT
Start: 2023-12-24 | End: 2023-12-24

## 2023-12-24 RX ADMIN — NALOXONE HYDROCHLORIDE NASAL SPRAY 1 SPRAY: 4 INHALANT NASAL at 12:32

## 2023-12-24 RX ADMIN — POTASSIUM CHLORIDE 10 MEQ: 7.46 INJECTION, SOLUTION INTRAVENOUS at 10:24

## 2023-12-24 RX ADMIN — POTASSIUM BICARBONATE 40 MEQ: 782 TABLET, EFFERVESCENT ORAL at 12:07

## 2023-12-24 NOTE — ED NOTES
Patient's groggy but answering questions appropriately. Patient states earlier he snorted \"some type of opium\" in an attempt to get high, not to end his life. Patient states he's not suicidal or homicidal, denies a/v hallucinations. Patient requests to rest in ED then get a cab to [facility] so he doesn't miss his appointment. Writer informed patient it's Saturday Dec 24th, [facility]'s not open. Patient then states he's homeless & requests transport to shelter after resting in ED. Patient denies regular drug/ETOH use. Writer relayed details to Resident.

## 2023-12-24 NOTE — ED NOTES
Pt to ED by ems after being found OD'd on the side of the road. Pt given 6mg narcan IN by [facility] and 2mg IV by ems to which pt responded and became A&O x3. Pt on arrival to room ambulated to stretcher, pt amnestic to events, pt sats 83% on RA on arrival. Pt placed on 2L NC and sats now 94%. Pt states he is \"a little suicidal maybe\" and wants to go to [facility].

## 2023-12-24 NOTE — ED NOTES
Labeled blood specimens sent to lab via tube system.     [x] Lavender   [] on ice   [x] Blue   [x] Green/yellow  [x] Green/black [] on ice  [] Pink  [] Red  [] Yellow  [] on ice  [] Blood Cultures

## 2023-12-24 NOTE — DISCHARGE INSTRUCTIONS
You were seen today in the emergency department for your drug overdose. We have evaluated you and determined that you likely overdosed on opiates. We now feel you are safe for discharge home. We have given you Narcan overdose. Please spray the Narcan up and individuals knows who you believe may be overdose. Please teach her friends and family how to use this kit, the instructions are in the kit. They may be able to save your life. Please return to the emergency department immediately if develop any new or worsening concerns including chest pain, shortness of breath, abdominal pain, nausea, vomiting, diarrhea, weakness, loss consciousness, fever, chills, or any other concerns. Please call your PCP and schedule appointment within the next 24 to 48 hours for follow-up.

## 2023-12-25 ENCOUNTER — HOSPITAL ENCOUNTER (EMERGENCY)
Age: 33
Discharge: HOME | End: 2023-12-25
Attending: STUDENT IN AN ORGANIZED HEALTH CARE EDUCATION/TRAINING PROGRAM
Payer: COMMERCIAL

## 2023-12-25 ENCOUNTER — HOSPITAL ENCOUNTER (EMERGENCY)
Age: 33
Discharge: HOME | End: 2023-12-25
Attending: EMERGENCY MEDICINE
Payer: COMMERCIAL

## 2023-12-25 VITALS
TEMPERATURE: 98.1 F | BODY MASS INDEX: 28.25 KG/M2 | OXYGEN SATURATION: 96 % | HEIGHT: 67 IN | HEART RATE: 96 BPM | WEIGHT: 180 LBS | DIASTOLIC BLOOD PRESSURE: 81 MMHG | SYSTOLIC BLOOD PRESSURE: 125 MMHG | RESPIRATION RATE: 12 BRPM

## 2023-12-25 VITALS
HEART RATE: 114 BPM | SYSTOLIC BLOOD PRESSURE: 147 MMHG | TEMPERATURE: 99.1 F | DIASTOLIC BLOOD PRESSURE: 86 MMHG | HEIGHT: 67 IN | OXYGEN SATURATION: 98 % | BODY MASS INDEX: 28.25 KG/M2 | RESPIRATION RATE: 12 BRPM | WEIGHT: 180 LBS

## 2023-12-25 DIAGNOSIS — Z76.5 MALINGERING: ICD-10-CM

## 2023-12-25 DIAGNOSIS — L85.3 DRY SKIN: Primary | ICD-10-CM

## 2023-12-25 DIAGNOSIS — M79.672 BILATERAL FOOT PAIN: Primary | ICD-10-CM

## 2023-12-25 DIAGNOSIS — M79.671 BILATERAL FOOT PAIN: Primary | ICD-10-CM

## 2023-12-25 DIAGNOSIS — F41.1 ANXIETY STATE: ICD-10-CM

## 2023-12-25 PROCEDURE — 99283 EMERGENCY DEPT VISIT LOW MDM: CPT

## 2023-12-25 PROCEDURE — 6370000000 HC RX 637 (ALT 250 FOR IP): Performed by: STUDENT IN AN ORGANIZED HEALTH CARE EDUCATION/TRAINING PROGRAM

## 2023-12-25 PROCEDURE — 6370000000 HC RX 637 (ALT 250 FOR IP): Performed by: EMERGENCY MEDICINE

## 2023-12-25 RX ORDER — ALPRAZOLAM 0.5 MG/1
0.5 TABLET ORAL 3 TIMES DAILY PRN
Qty: 10 TABLET | Refills: 0 | Status: SHIPPED | OUTPATIENT
Start: 2023-12-25 | End: 2024-01-03

## 2023-12-25 RX ORDER — ACETAMINOPHEN 500 MG
500 TABLET ORAL 4 TIMES DAILY PRN
Qty: 120 TABLET | Refills: 0 | Status: SHIPPED | OUTPATIENT
Start: 2023-12-25

## 2023-12-25 RX ORDER — ALPRAZOLAM 0.5 MG/1
0.5 TABLET ORAL ONCE
Status: COMPLETED | OUTPATIENT
Start: 2023-12-25 | End: 2023-12-25

## 2023-12-25 RX ORDER — IBUPROFEN 800 MG/1
800 TABLET ORAL ONCE
Status: COMPLETED | OUTPATIENT
Start: 2023-12-25 | End: 2023-12-25

## 2023-12-25 RX ORDER — QUETIAPINE FUMARATE 25 MG/1
50 TABLET, FILM COATED ORAL ONCE
Status: COMPLETED | OUTPATIENT
Start: 2023-12-25 | End: 2023-12-25

## 2023-12-25 RX ORDER — ACETAMINOPHEN 500 MG
1000 TABLET ORAL ONCE
Status: COMPLETED | OUTPATIENT
Start: 2023-12-25 | End: 2023-12-25

## 2023-12-25 RX ADMIN — IBUPROFEN 800 MG: 800 TABLET ORAL at 21:41

## 2023-12-25 RX ADMIN — ACETAMINOPHEN 1000 MG: 500 TABLET ORAL at 21:41

## 2023-12-25 RX ADMIN — ALPRAZOLAM 0.5 MG: 0.5 TABLET ORAL at 21:41

## 2023-12-25 RX ADMIN — QUETIAPINE FUMARATE 50 MG: 25 TABLET ORAL at 02:14

## 2023-12-25 RX ADMIN — ACETAMINOPHEN 1000 MG: 500 TABLET ORAL at 02:13

## 2023-12-25 ASSESSMENT — PAIN DESCRIPTION - ORIENTATION: ORIENTATION: LEFT

## 2023-12-25 ASSESSMENT — PAIN DESCRIPTION - LOCATION: LOCATION: FOOT

## 2023-12-25 ASSESSMENT — PAIN SCALES - GENERAL
PAINLEVEL_OUTOF10: 2
PAINLEVEL_OUTOF10: 10

## 2023-12-25 ASSESSMENT — PAIN DESCRIPTION - DESCRIPTORS: DESCRIPTORS: THROBBING

## 2023-12-25 ASSESSMENT — PAIN - FUNCTIONAL ASSESSMENT
PAIN_FUNCTIONAL_ASSESSMENT: NONE - DENIES PAIN
PAIN_FUNCTIONAL_ASSESSMENT: NONE - DENIES PAIN

## 2023-12-25 NOTE — DISCHARGE INSTRUCTIONS
PLEASE RETURN TO THE EMERGENCY DEPARTMENT IMMEDIATELY for worsening of pain, decrease sensation to arms or legs, inability to move arms or legs, shortness of breath, severe chest pain, excessive nausea or vomiting, notice any bruising to your abdomen or have increase in abdominal pain, or if you develop any concerning symptoms such as: high fever not relieved by acetaminophen (Tylenol) and/or ibuprofen (Motrin / Advil), chills, feeling of your heart fluttering or racing, persistent nausea and/or vomiting, vomiting up blood, blood in your stool, loss of consciousness, numbness, weakness or tingling in the arms or legs or change in color of the extremities, changes in mental status, persistent headache, blurry vision, loss of bladder / bowel control, unable to follow up with your physician, or other any other care or concern.

## 2023-12-25 NOTE — ED NOTES
Pt presents to the er c/o bilateral foot pain pt states that he wants this writer to take a good look at his feet to see what's wrong with them pt bilateral feet are with intact skin no redness swelling or wounds

## 2023-12-26 ENCOUNTER — APPOINTMENT (OUTPATIENT)
Dept: GENERAL RADIOLOGY | Age: 33
End: 2023-12-26
Payer: COMMERCIAL

## 2023-12-26 ENCOUNTER — HOSPITAL ENCOUNTER (EMERGENCY)
Age: 33
Discharge: HOME | End: 2023-12-26
Attending: EMERGENCY MEDICINE
Payer: COMMERCIAL

## 2023-12-26 VITALS
HEIGHT: 67 IN | SYSTOLIC BLOOD PRESSURE: 136 MMHG | BODY MASS INDEX: 28.25 KG/M2 | OXYGEN SATURATION: 96 % | WEIGHT: 180 LBS | DIASTOLIC BLOOD PRESSURE: 78 MMHG | RESPIRATION RATE: 18 BRPM | TEMPERATURE: 97.8 F | HEART RATE: 99 BPM

## 2023-12-26 DIAGNOSIS — M79.89 SWELLING OF RIGHT HAND: ICD-10-CM

## 2023-12-26 DIAGNOSIS — S80.811A ABRASION OF RIGHT LOWER EXTREMITY, INITIAL ENCOUNTER: Primary | ICD-10-CM

## 2023-12-26 PROCEDURE — 99283 EMERGENCY DEPT VISIT LOW MDM: CPT

## 2023-12-26 PROCEDURE — 6370000000 HC RX 637 (ALT 250 FOR IP): Performed by: STUDENT IN AN ORGANIZED HEALTH CARE EDUCATION/TRAINING PROGRAM

## 2023-12-26 RX ORDER — DIPHENHYDRAMINE HCL 25 MG
25 TABLET ORAL ONCE
Status: DISCONTINUED | OUTPATIENT
Start: 2023-12-26 | End: 2023-12-26

## 2023-12-26 RX ORDER — BACITRACIN ZINC AND POLYMYXIN B SULFATE 500; 1000 [USP'U]/G; [USP'U]/G
OINTMENT TOPICAL
Qty: 15 G | Refills: 0 | Status: SHIPPED | OUTPATIENT
Start: 2023-12-26 | End: 2024-01-02

## 2023-12-26 RX ORDER — ACETAMINOPHEN 325 MG/1
650 TABLET ORAL ONCE
Status: COMPLETED | OUTPATIENT
Start: 2023-12-26 | End: 2023-12-26

## 2023-12-26 RX ORDER — GINSENG 100 MG
CAPSULE ORAL ONCE
Status: COMPLETED | OUTPATIENT
Start: 2023-12-26 | End: 2023-12-26

## 2023-12-26 RX ADMIN — ACETAMINOPHEN 650 MG: 325 TABLET ORAL at 03:22

## 2023-12-26 RX ADMIN — BACITRACIN: 500 OINTMENT TOPICAL at 03:23

## 2023-12-26 ASSESSMENT — PAIN DESCRIPTION - ORIENTATION: ORIENTATION: RIGHT

## 2023-12-26 ASSESSMENT — PAIN DESCRIPTION - DESCRIPTORS: DESCRIPTORS: ACHING

## 2023-12-26 ASSESSMENT — PAIN SCALES - GENERAL: PAINLEVEL_OUTOF10: 7

## 2023-12-26 ASSESSMENT — PAIN - FUNCTIONAL ASSESSMENT: PAIN_FUNCTIONAL_ASSESSMENT: 0-10

## 2023-12-26 ASSESSMENT — PAIN DESCRIPTION - LOCATION: LOCATION: HAND

## 2023-12-26 NOTE — ED PROVIDER NOTES
hanging ([address]), Tobacco dependence, Ulcerative colitis ([address]), and UTI (urinary tract infection). has a past surgical history that includes Colonoscopy; bronchoscopy; other surgical history; Upper gastrointestinal endoscopy (2/4/16); pr egd transoral biopsy single/multiple (N/A, 3/20/2017); sigmoidoscopy (N/A, 3/20/2017); Cholecystectomy, laparoscopic (07/14/2017); pr esophagogastroduodenoscopy transoral diagnostic (N/A, 8/9/2017); pr colonoscopy w/biopsy single/multiple (8/9/2017); Abdomen surgery; Upper gastrointestinal endoscopy (N/A, 6/13/2018); Upper gastrointestinal endoscopy (N/A, 9/20/2018); and Endoscopy, colon, diagnostic. Social History     Socioeconomic History    Marital status: Single     Spouse name: Not on file    Number of children: Not on file    Years of education: Not on file    Highest education level: Not on file   Occupational History    Occupation: disability   Tobacco Use    Smoking status: Every Day     Current packs/day: 0.50     Average packs/day: 0.5 packs/day for 15.0 years (7.5 ttl pk-yrs)     Types: Cigarettes    Smokeless tobacco: Never   Vaping Use    Vaping Use: Former   Substance and Sexual Activity    Alcohol use: Not Currently     Comment: drinks daily    Drug use: Yes     Types: Cocaine     Comment: Currently on suboxone. Hx of opiates, benzos, cocaine, alcohol abuse    Sexual activity: Yes     Partners: Female     Comment: Lives alone, not working.    Other Topics Concern    Not on file   Social History Narrative    ** Merged History Encounter **          Social Determinants of Health     Financial Resource Strain: Medium Risk (11/20/2021)    Overall Financial Resource Strain (CARDIA)     Difficulty of Paying Living Expenses: Somewhat hard   Food Insecurity: No Food Insecurity (11/20/2021)    Hunger Vital Sign     Worried About Running Out of Food in the Last Year: Never true     Ran Out of Food in the Last Year: Never true   Transportation Needs: No Transportation soon as possible for a visit         DISCHARGE MEDICATIONS:  Discharge Medication List as of 12/26/2023  3:18 AM        START taking these medications    Details   bacitracin-polymyxin b (POLYSPORIN) [id number] UNIT/GM ointment Apply topically 2 times daily., Disp-15 g, R-0, Print             Sheeba Payne MD  Emergency Medicine Resident    (Please note that portions of this note were completed with a voice recognition program.  Efforts were made to edit the dictations but occasionally words are mis-transcribed.)

## 2023-12-26 NOTE — ED TRIAGE NOTES
[facility] PD notified to escort patient to lobby. Pt given verbal directions x2 after discharge teaching.

## 2023-12-26 NOTE — ED PROVIDER NOTES
EMERGENCY DEPARTMENT ENCOUNTER    Pt Name: Karime Cobb  MRN: [id number]  [address] [facility] 1990  Date of evaluation: 12/25/23  CHIEF COMPLAINT       Chief Complaint   Patient presents with    Hand Swelling     Started a few days ago. HISTORY OF PRESENT ILLNESS   HPI   Dry skin    REVIEW OF SYSTEMS     Review of Systems  All other systems reviewed and are negative.     PASTMEDICAL HISTORY     Past Medical History:   Diagnosis Date    Anxiety     Arthritis     Asthma     Bipolar I disorder, most recent episode (or current) depressed, unspecified 9/12/2014    Clostridium difficile infection     COPD (chronic obstructive pulmonary disease) ([address])     Depression     Disease of blood and blood forming organ     Eczema     Fracture, metacarpal     R 4th and 5th    Gastric ulcer     Gastritis 06/13/2018    GERD (gastroesophageal reflux disease)     GI bleed     H. 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[address])     Substance abuse ([address])     Hx of opiates, benzos, cocaine, alcohol abuse    Suicidal ideation     Suicide attempt by hanging ([address])     Tobacco dependence     Ulcerative colitis ([address])     UTI (urinary tract infection)      Past Problem List  Patient Active Problem List   Diagnosis Code    Opioid abuse ([facility] [address]) F11.10    Noncompliance Z91.199    Rectal bleeding K62.5    Smoker F17.200    Juvenile rheumatoid arthritis ([address]) M08.00    SRIRAM (obstructive sleep apnea) G47.33    Primary insomnia F51.01    Calculus of bile duct with acute on chronic cholecystitis K80.46    Mild intermittent asthma without complication M14.05    Gastritis K29.70    Generalized abdominal pain R10.84

## 2023-12-26 NOTE — DISCHARGE INSTRUCTIONS
Use lotion on your skin to decrease the dryness and itchiness. Return to this emergency room immediately if your symptoms persist, worsen or if new ones form. Make sure you follow-up with your primary care doctor within the next 1-2 business days.

## 2023-12-26 NOTE — DISCHARGE INSTRUCTIONS
Thank you for visiting [address] Emergency Department. You need to call the number below to make an appointment with your primary care provider as directed for follow up. Should you have any questions regarding your care or further treatment, please call [facility] at [phone].

## 2023-12-27 ENCOUNTER — HOSPITAL ENCOUNTER (EMERGENCY)
Age: 33
Discharge: HOME | End: 2023-12-27
Attending: EMERGENCY MEDICINE
Payer: COMMERCIAL

## 2023-12-27 VITALS
SYSTOLIC BLOOD PRESSURE: 142 MMHG | HEART RATE: 109 BPM | TEMPERATURE: 98.3 F | RESPIRATION RATE: 16 BRPM | OXYGEN SATURATION: 94 % | DIASTOLIC BLOOD PRESSURE: 94 MMHG

## 2023-12-27 DIAGNOSIS — R52 GENERALIZED PAIN: Primary | ICD-10-CM

## 2023-12-27 DIAGNOSIS — M79.89 LEG SWELLING: ICD-10-CM

## 2023-12-27 LAB
EKG ATRIAL RATE: 82 BPM
EKG P AXIS: 65 DEGREES
EKG P-R INTERVAL: 146 MS
EKG Q-T INTERVAL: 384 MS
EKG QRS DURATION: 96 MS
EKG QTC CALCULATION (BAZETT): 448 MS
EKG R AXIS: 62 DEGREES
EKG T AXIS: 55 DEGREES
EKG VENTRICULAR RATE: 82 BPM

## 2023-12-27 PROCEDURE — 6370000000 HC RX 637 (ALT 250 FOR IP): Performed by: EMERGENCY MEDICINE

## 2023-12-27 PROCEDURE — 99283 EMERGENCY DEPT VISIT LOW MDM: CPT

## 2023-12-27 RX ORDER — ACETAMINOPHEN 325 MG/1
650 TABLET ORAL ONCE
Status: COMPLETED | OUTPATIENT
Start: 2023-12-27 | End: 2023-12-27

## 2023-12-27 RX ADMIN — ACETAMINOPHEN 650 MG: 325 TABLET ORAL at 23:50

## 2023-12-28 VITALS
OXYGEN SATURATION: 100 % | RESPIRATION RATE: 18 BRPM | SYSTOLIC BLOOD PRESSURE: 151 MMHG | TEMPERATURE: 98.2 F | DIASTOLIC BLOOD PRESSURE: 93 MMHG | HEART RATE: 103 BPM

## 2023-12-28 PROCEDURE — 99283 EMERGENCY DEPT VISIT LOW MDM: CPT

## 2023-12-28 NOTE — ED PROVIDER NOTES
Gif[facility]      Pt Name: Angela Anderson  MRN: [id number]  [address] [facility] 1990  Date of evaluation: 12/28/23    CHIEF COMPLAINT       Chief Complaint   Patient presents with    Foot Pain     States needs a place to shower, new socks and a phone       HISTORY OF PRESENT ILLNESS   Angela Anderson is a 35 y.o. male who presents with pain to the bilateral feet. Patient is well-known to myself long psychiatric history. Secondary complaints of needs socks on the phone as well as to shower.     PASTMEDICAL HISTORY     Past Medical History:   Diagnosis Date    Anxiety     Arthritis     Asthma     Bipolar I disorder, most recent episode (or current) depressed, unspecified 9/12/2014    Clostridium difficile infection     COPD (chronic obstructive pulmonary disease) ([address])     Depression     Disease of blood and blood forming organ     Eczema     Fracture, metacarpal     R 4th and 5th    Gastric ulcer     Gastritis 06/13/2018    GERD (gastroesophageal reflux disease)     GI bleed     H. 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[address])     Substance abuse ([facility] [address])     Hx of opiates, benzos, cocaine, alcohol abuse    Suicidal ideation     Suicide attempt by hanging ([address])     Tobacco dependence     Ulcerative colitis ([address])     UTI (urinary tract infection)      Past Problem List  Patient Active Problem List   Diagnosis Code    Opioid abuse ([facility] [address]) F11.10    Noncompliance Z91.199    Rectal bleeding K62.5    Smoker F17.200    Juvenile rheumatoid arthritis ([address]) M08.00    SRIRAM (obstructive sleep apnea) G47.33    Primary insomnia F51.01    Calculus of bile duct with acute

## 2023-12-28 NOTE — ED PROVIDER NOTES
colitis ([facility])     UTI (urinary tract infection)      Past Problem List  Patient Active Problem List   Diagnosis Code    Opioid abuse ([facility]) F11.10    Noncompliance Z91.199    Rectal bleeding K62.5    Smoker F17.200    Juvenile rheumatoid arthritis ([facility]) M08.00    SRIRAM (obstructive sleep apnea) G47.33    Primary insomnia F51.01    Calculus of bile duct with acute on chronic cholecystitis K80.46    Mild intermittent asthma without complication M29.73    Gastritis K29.70    Generalized abdominal pain R10.84    Anemia D64.9    Elevated liver enzymes R74.8    Nausea and vomiting R11.2    Opioid type dependence, continuous ([facility]) F11.20    Abdominal pain R10.9    Diarrhea R19.7    Irritable bowel syndrome with both constipation and diarrhea K58.2    Schizoaffective disorder, bipolar type ([facility]) F25.0    GI bleed K92.2    Depression with suicidal ideation F32. A, R45.851    Schizoaffective disorder ([address] [place] [address]) F25.9    MDD (major depressive disorder), recurrent severe, without psychosis ([address] [place] [address]) F33.2    Severe episode of recurrent major depressive disorder, without psychotic features ([facility]) F33.2    Diabetes mellitus type 2 in obese ([facility]) E11.69, E66.9    Mixed hyperlipidemia E78.2    Cocaine abuse ([facility]) F14.10    Acute psychosis ([facility]) F23    PUD (peptic ulcer disease) K27.9    Gastroesophageal reflux disease without esophagitis K21.9    Prediabetes R73.03    Acute respiratory failure with hypoxia ([facility]) J96.01    Rhabdomyolysis M62.82    Acute encephalopathy G93.40    PHI (acute kidney injury) ([facility]) N17.9    High anion gap metabolic acidosis U55.67     SURGICAL HISTORY       Past Surgical History:   Procedure Laterality Date    ABDOMEN SURGERY      upper GI scope 7/7/2015    BRONCHOSCOPY      CHOLECYSTECTOMY, LAPAROSCOPIC  07/14/2017    surgery performed at [address] [facility], COLON, DIAGNOSTIC      OTHER SURGICAL HISTORY      lumbar puncture    IN COLONOSCOPY W/BIOPSY SINGLE/MULTIPLE  8/9/2017    COLONOSCOPY WITH BIOPSY

## 2023-12-29 ENCOUNTER — HOSPITAL ENCOUNTER (EMERGENCY)
Age: 33
Discharge: HOME | End: 2023-12-29
Attending: EMERGENCY MEDICINE
Payer: COMMERCIAL

## 2023-12-29 DIAGNOSIS — Z76.0 ENCOUNTER FOR MEDICATION REFILL: Primary | ICD-10-CM

## 2023-12-29 RX ORDER — IBUPROFEN 600 MG/1
600 TABLET ORAL 3 TIMES DAILY PRN
Qty: 30 TABLET | Refills: 0 | Status: SHIPPED | OUTPATIENT
Start: 2023-12-29

## 2023-12-29 RX ORDER — CYCLOBENZAPRINE HCL 10 MG
10 TABLET ORAL 3 TIMES DAILY PRN
Qty: 30 TABLET | Refills: 0 | Status: SHIPPED | OUTPATIENT
Start: 2023-12-29 | End: 2024-01-08

## 2023-12-29 RX ORDER — ALPRAZOLAM 0.5 MG/1
0.5 TABLET ORAL 3 TIMES DAILY PRN
Qty: 30 TABLET | Refills: 0 | Status: SHIPPED | OUTPATIENT
Start: 2023-12-29 | End: 2024-01-08

## 2023-12-29 NOTE — DISCHARGE INSTRUCTIONS
your medications at the pharmacy tomorrow. If you have any concerning symptoms come back to the ER. Follow-up with your primary care doctor.

## 2023-12-29 NOTE — ED NOTES
Pt ambulatory to room 8 requesting med refills. Pt states he is hoping to stock up meds and food in lieu of checking into a mental health treatment center near [place].  Pt is without complaints at this time

## 2023-12-30 ENCOUNTER — HOSPITAL ENCOUNTER (EMERGENCY)
Age: 33
Discharge: HOME | End: 2023-12-30
Attending: EMERGENCY MEDICINE
Payer: COMMERCIAL

## 2023-12-30 VITALS
TEMPERATURE: 98.3 F | OXYGEN SATURATION: 96 % | RESPIRATION RATE: 16 BRPM | HEART RATE: 96 BPM | DIASTOLIC BLOOD PRESSURE: 112 MMHG | SYSTOLIC BLOOD PRESSURE: 151 MMHG

## 2023-12-30 DIAGNOSIS — G89.29 CHRONIC PAIN OF BOTH FEET: Primary | ICD-10-CM

## 2023-12-30 DIAGNOSIS — M79.671 CHRONIC PAIN OF BOTH FEET: Primary | ICD-10-CM

## 2023-12-30 DIAGNOSIS — M79.672 CHRONIC PAIN OF BOTH FEET: Primary | ICD-10-CM

## 2023-12-30 PROCEDURE — 6370000000 HC RX 637 (ALT 250 FOR IP): Performed by: EMERGENCY MEDICINE

## 2023-12-30 PROCEDURE — 99282 EMERGENCY DEPT VISIT SF MDM: CPT

## 2023-12-30 RX ORDER — BUSPIRONE HYDROCHLORIDE 10 MG/1
10 TABLET ORAL ONCE
Status: DISCONTINUED | OUTPATIENT
Start: 2023-12-30 | End: 2023-12-31 | Stop reason: HOSPADM

## 2023-12-30 NOTE — ED PROVIDER NOTES
EMERGENCY DEPARTMENT ENCOUNTER    Pt Name: Marcus Alvarenga  MRN: [id number]  [address] 1990  Date of evaluation: 12/29/23  CHIEF COMPLAINT       Chief Complaint   Patient presents with    Medication Refill     Wants flexeril and 0.5 of xanax. Pt states he wants to go to [place], but states he need to bring his meds but is out of his anixety meds. HISTORY OF PRESENT ILLNESS   HPI  35 male history of bipolar, juvenile rheumatoid arthritis presents to the ED requesting refills of his Xanax, ibuprofen, Flexeril. Patient states that he is homeless, is supposed to start a new inpatient treatment program soon but they requested that he come with some of his medications. Patient states that he has been out, but is unable to get follow-up to get refills. He denies any acute complaints. REVIEW OF SYSTEMS     Review of Systems   All other systems reviewed and are negative.     PASTMEDICAL HISTORY     Past Medical History:   Diagnosis Date    Anxiety     Arthritis     Asthma     Bipolar I disorder, most recent episode (or current) depressed, unspecified 9/12/2014    Clostridium difficile infection     COPD (chronic obstructive pulmonary disease) ([facility])     Depression     Disease of blood and blood forming organ     Eczema     Fracture, metacarpal     R 4th and 5th    Gastric ulcer     Gastritis 06/13/2018    GERD (gastroesophageal reflux disease)     GI bleed     H. 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

## 2023-12-30 NOTE — ED NOTES
Pt arrived to ed with c/o bilateral hand pain and bilateral foot pain. Pt denies any new injury. Pt is homeless and frequents many ER's almost daily. No visible signs of injury. Respirations non labored. Pt A&Ox3.

## 2023-12-31 NOTE — ED PROVIDER NOTES
[facility] ED  Emergency Department Encounter  Emergency Medicine Physician Note     Pt Name:Jose Kaplan  MRN: [id number]  Birthdate 1990  Date of evaluation: 12/30/23  PCP:  No primary care provider on file.  Note Started: 8:41 PM EST      CHIEF COMPLAINT       Chief Complaint   Patient presents with    Foot Pain     Bilateral foot pain       HISTORY OF PRESENT ILLNESS  (Location/Symptom, Timing/Onset, Context/Setting, Quality, Duration, Modifying Factors, Severity.)      Jose Kaplan is a 33 y.o. male who presents with concerns for anxiety, patient also has concerns of bilateral foot pain after walking a lot on his feet.  Patient is well-known to this emergency department, regular here.  Patient requesting food initially on arrival.    PAST MEDICAL / SURGICAL / SOCIAL / FAMILY HISTORY      has a past medical history of Anxiety, Arthritis, Asthma, Bipolar I disorder, most recent episode (or current) depressed, unspecified, Clostridium difficile infection, COPD (chronic obstructive pulmonary disease) (HCC), Depression, Disease of blood and blood forming organ, Eczema, Fracture, metacarpal, Gastric ulcer, Gastritis, GERD (gastroesophageal reflux disease), GI bleed, H. pylori infection, H/O blood clots, Head injury, Headache, Insomnia, Juvenile rheumatoid arthritis (HCC), Neuromuscular disorder (HCC), PFAPA syndrome (HCC), PUD (peptic ulcer disease), Rheumatoid arthritis (HCC), Rheumatoid arthritis(714.0), Severe recurrent major depression without psychotic features ([facility]), Sleep apnea, Still's disease (HCC), Substance abuse (HCC), Suicidal ideation, Suicide attempt by hanging ([facility]), Tobacco dependence, Ulcerative colitis (HCC), and UTI (urinary tract infection).  No additional pertinent        has a past surgical history that includes Colonoscopy; bronchoscopy; other surgical history; Upper gastrointestinal endoscopy (2/4/16); pr egd transoral biopsy single/multiple (N/A, 3/20/2017); sigmoidoscopy

## 2024-01-02 ENCOUNTER — HOSPITAL ENCOUNTER (EMERGENCY)
Age: 34
Discharge: HOME | End: 2024-01-02
Attending: EMERGENCY MEDICINE
Payer: COMMERCIAL

## 2024-01-02 VITALS
DIASTOLIC BLOOD PRESSURE: 78 MMHG | SYSTOLIC BLOOD PRESSURE: 125 MMHG | HEART RATE: 77 BPM | OXYGEN SATURATION: 98 % | TEMPERATURE: 98.5 F | RESPIRATION RATE: 17 BRPM

## 2024-01-02 DIAGNOSIS — M79.605 LEG PAIN, BILATERAL: Primary | ICD-10-CM

## 2024-01-02 DIAGNOSIS — M79.604 LEG PAIN, BILATERAL: Primary | ICD-10-CM

## 2024-01-02 PROCEDURE — 99283 EMERGENCY DEPT VISIT LOW MDM: CPT

## 2024-01-02 PROCEDURE — 6370000000 HC RX 637 (ALT 250 FOR IP): Performed by: EMERGENCY MEDICINE

## 2024-01-02 RX ORDER — ACETAMINOPHEN 500 MG
1000 TABLET ORAL ONCE
Status: COMPLETED | OUTPATIENT
Start: 2024-01-02 | End: 2024-01-02

## 2024-01-02 RX ADMIN — ACETAMINOPHEN 1000 MG: 500 TABLET ORAL at 23:42

## 2024-01-02 ASSESSMENT — PAIN DESCRIPTION - DESCRIPTORS
DESCRIPTORS: ACHING
DESCRIPTORS: ACHING

## 2024-01-02 ASSESSMENT — PAIN SCALES - GENERAL
PAINLEVEL_OUTOF10: 10
PAINLEVEL_OUTOF10: 10

## 2024-01-02 ASSESSMENT — PAIN - FUNCTIONAL ASSESSMENT: PAIN_FUNCTIONAL_ASSESSMENT: 0-10

## 2024-01-02 ASSESSMENT — PAIN DESCRIPTION - LOCATION
LOCATION: GENERALIZED
LOCATION: FOOT;HAND

## 2024-01-02 ASSESSMENT — PAIN DESCRIPTION - ORIENTATION: ORIENTATION: RIGHT;LEFT

## 2024-01-03 ENCOUNTER — HOSPITAL ENCOUNTER (EMERGENCY)
Age: 34
Discharge: HOME | End: 2024-01-03

## 2024-01-03 ENCOUNTER — HOSPITAL ENCOUNTER (EMERGENCY)
Age: 34
Discharge: HOME | End: 2024-01-03
Attending: EMERGENCY MEDICINE
Payer: COMMERCIAL

## 2024-01-03 VITALS
TEMPERATURE: 97.9 F | SYSTOLIC BLOOD PRESSURE: 132 MMHG | HEART RATE: 84 BPM | BODY MASS INDEX: 28.19 KG/M2 | HEIGHT: 67 IN | DIASTOLIC BLOOD PRESSURE: 86 MMHG | RESPIRATION RATE: 18 BRPM | OXYGEN SATURATION: 97 %

## 2024-01-03 DIAGNOSIS — Z76.0 ENCOUNTER FOR MEDICATION REFILL: ICD-10-CM

## 2024-01-03 DIAGNOSIS — F25.0 SCHIZOAFFECTIVE DISORDER, BIPOLAR TYPE (HCC): Primary | Chronic | ICD-10-CM

## 2024-01-03 PROCEDURE — 99283 EMERGENCY DEPT VISIT LOW MDM: CPT

## 2024-01-03 RX ORDER — IBUPROFEN 600 MG/1
600 TABLET ORAL 3 TIMES DAILY PRN
Qty: 30 TABLET | Refills: 0 | Status: SHIPPED | OUTPATIENT
Start: 2024-01-03 | End: 2024-01-04

## 2024-01-03 RX ORDER — QUETIAPINE FUMARATE 100 MG/1
100 TABLET, FILM COATED ORAL NIGHTLY
Qty: 30 TABLET | Refills: 0 | Status: SHIPPED | OUTPATIENT
Start: 2024-01-03 | End: 2024-01-04

## 2024-01-03 RX ORDER — ONDANSETRON 4 MG/1
4 TABLET, ORALLY DISINTEGRATING ORAL 3 TIMES DAILY PRN
Qty: 21 TABLET | Refills: 0 | Status: SHIPPED | OUTPATIENT
Start: 2024-01-03 | End: 2024-01-04

## 2024-01-03 RX ORDER — ALPRAZOLAM 0.5 MG/1
0.5 TABLET ORAL 3 TIMES DAILY PRN
Qty: 10 TABLET | Refills: 0 | Status: SHIPPED | OUTPATIENT
Start: 2024-01-03 | End: 2024-01-04

## 2024-01-03 NOTE — ED PROVIDER NOTES
[facility] ED  Emergency Department Encounter  Emergency Medicine Physician Note     Pt Name:Jose Kaplan  MRN: [id number]  Birthdate 1990  Date of evaluation: 1/2/24  PCP:  No primary care provider on file.  Note Started: 11:25 PM EST      CHIEF COMPLAINT       Chief Complaint   Patient presents with    Hand Pain    Toe Pain     Pt states that he wants his feet pain and hand pain checked out        HISTORY OF PRESENT ILLNESS  (Location/Symptom, Timing/Onset, Context/Setting, Quality, Duration, Modifying Factors, Severity.)      Jose Kaplan is a 33 y.o. male who presents with bilateral feet pain and bilateral hand pain.  Patient states that he had some swelling in his hands and feet.  Patient has no specific complaint, has had this chronic pain for a long time and has been evaluated multiple times.      PAST MEDICAL / SURGICAL / SOCIAL / FAMILY HISTORY      has a past medical history of Anxiety, Arthritis, Asthma, Bipolar I disorder, most recent episode (or current) depressed, unspecified, Clostridium difficile infection, COPD (chronic obstructive pulmonary disease) (HCC), Depression, Disease of blood and blood forming organ, Eczema, Fracture, metacarpal, Gastric ulcer, Gastritis, GERD (gastroesophageal reflux disease), GI bleed, H. pylori infection, H/O blood clots, Head injury, Headache, Insomnia, Juvenile rheumatoid arthritis (HCC), Neuromuscular disorder (HCC), PFAPA syndrome (HCC), PUD (peptic ulcer disease), Rheumatoid arthritis (HCC), Rheumatoid arthritis(714.0), Severe recurrent major depression without psychotic features (HCC), Sleep apnea, Still's disease (HCC), Substance abuse ([facility]), Suicidal ideation, Suicide attempt by hanging ([facility]), Tobacco dependence, Ulcerative colitis (HCC), and UTI (urinary tract infection).  No additional pertinent        has a past surgical history that includes Colonoscopy; bronchoscopy; other surgical history; Upper gastrointestinal endoscopy (2/4/16); pr egd

## 2024-01-04 ENCOUNTER — HOSPITAL ENCOUNTER (EMERGENCY)
Age: 34
Discharge: HOME | End: 2024-01-04
Attending: EMERGENCY MEDICINE
Payer: COMMERCIAL

## 2024-01-04 ENCOUNTER — HOSPITAL ENCOUNTER (EMERGENCY)
Age: 34
Discharge: HOME | End: 2024-01-04
Attending: EMERGENCY MEDICINE

## 2024-01-04 VITALS
RESPIRATION RATE: 18 BRPM | DIASTOLIC BLOOD PRESSURE: 81 MMHG | SYSTOLIC BLOOD PRESSURE: 117 MMHG | HEART RATE: 110 BPM | OXYGEN SATURATION: 96 %

## 2024-01-04 VITALS
TEMPERATURE: 97.9 F | SYSTOLIC BLOOD PRESSURE: 130 MMHG | HEART RATE: 84 BPM | DIASTOLIC BLOOD PRESSURE: 84 MMHG | OXYGEN SATURATION: 98 % | RESPIRATION RATE: 18 BRPM

## 2024-01-04 DIAGNOSIS — F25.0 SCHIZOAFFECTIVE DISORDER, BIPOLAR TYPE (HCC): Chronic | ICD-10-CM

## 2024-01-04 DIAGNOSIS — Z53.21 PATIENT LEFT WITHOUT BEING SEEN: Primary | ICD-10-CM

## 2024-01-04 DIAGNOSIS — Z76.0 ENCOUNTER FOR MEDICATION REFILL: Primary | ICD-10-CM

## 2024-01-04 DIAGNOSIS — Z76.5 MALINGERING: ICD-10-CM

## 2024-01-04 DIAGNOSIS — Z13.9 ENCOUNTER FOR MEDICAL SCREENING EXAMINATION: Primary | ICD-10-CM

## 2024-01-04 PROCEDURE — 6370000000 HC RX 637 (ALT 250 FOR IP): Performed by: STUDENT IN AN ORGANIZED HEALTH CARE EDUCATION/TRAINING PROGRAM

## 2024-01-04 PROCEDURE — 99283 EMERGENCY DEPT VISIT LOW MDM: CPT | Performed by: EMERGENCY MEDICINE

## 2024-01-04 PROCEDURE — 99283 EMERGENCY DEPT VISIT LOW MDM: CPT

## 2024-01-04 RX ORDER — QUETIAPINE FUMARATE 100 MG/1
100 TABLET, FILM COATED ORAL NIGHTLY
Qty: 30 TABLET | Refills: 0 | Status: SHIPPED | OUTPATIENT
Start: 2024-01-04 | End: 2024-02-03

## 2024-01-04 RX ORDER — IBUPROFEN 600 MG/1
600 TABLET ORAL 3 TIMES DAILY PRN
Qty: 30 TABLET | Refills: 0 | Status: SHIPPED | OUTPATIENT
Start: 2024-01-04

## 2024-01-04 RX ORDER — ALPRAZOLAM 0.5 MG/1
0.5 TABLET ORAL 3 TIMES DAILY PRN
Qty: 10 TABLET | Refills: 0 | Status: SHIPPED | OUTPATIENT
Start: 2024-01-04 | End: 2024-01-14

## 2024-01-04 RX ORDER — ACETAMINOPHEN 500 MG
500 TABLET ORAL ONCE
Status: COMPLETED | OUTPATIENT
Start: 2024-01-04 | End: 2024-01-04

## 2024-01-04 RX ORDER — ONDANSETRON 4 MG/1
4 TABLET, ORALLY DISINTEGRATING ORAL 3 TIMES DAILY PRN
Qty: 21 TABLET | Refills: 0 | Status: SHIPPED | OUTPATIENT
Start: 2024-01-04

## 2024-01-04 RX ADMIN — ACETAMINOPHEN 500 MG: 500 TABLET ORAL at 03:23

## 2024-01-04 ASSESSMENT — ENCOUNTER SYMPTOMS
GASTROINTESTINAL NEGATIVE: 1
RESPIRATORY NEGATIVE: 1

## 2024-01-04 ASSESSMENT — PAIN - FUNCTIONAL ASSESSMENT: PAIN_FUNCTIONAL_ASSESSMENT: 0-10

## 2024-01-04 ASSESSMENT — PAIN SCALES - GENERAL: PAINLEVEL_OUTOF10: 5

## 2024-01-04 NOTE — ED PROVIDER NOTES
Patient presented to the emergency department due to his hands being cold.  Patient is very well-known to the emergency department, has been seen in the emergency department multiple times tonight.  Patient refused vital signs in triage, refusing evaluation.   Patient left without being seen.    Topher Silver MD

## 2024-01-04 NOTE — DISCHARGE INSTRUCTIONS
Return to this emergency room immediately if your symptoms persist, worsen or if new ones form.    Make sure you follow-up with your primary care doctor within the next 1-2 business days.

## 2024-01-04 NOTE — ED PROVIDER NOTES
[facility] ED  Emergency Department Encounter  Emergency Medicine Resident     Pt Name:Jose Kaplan  MRN: [id number]  Birthdate 1990  Date of evaluation: 1/4/24  PCP:  No primary care provider on file.  Note Started: 3:45 AM EST    CHIEF COMPLAINT       No chief complaint on file.    HISTORY OF PRESENT ILLNESS  (Location/Symptom, Timing/Onset, Context/Setting, Quality, Duration, Modifying Factors, Severity.)      Jose Kaplan is a 33 y.o. male who presents with a request to \"get his hands looked at\". He is well known to this emergency department and has been seen twice today for similar complaint. He is asking for Tylenol. He denies hand pain, numbness, tingling, weakness, injury to his hands. He has no other medical complaints at this time.     PAST MEDICAL / SURGICAL / SOCIAL / FAMILY HISTORY      has a past medical history of Anxiety, Arthritis, Asthma, Bipolar I disorder, most recent episode (or current) depressed, unspecified, Clostridium difficile infection, COPD (chronic obstructive pulmonary disease) ([facility]), Depression, Disease of blood and blood forming organ, Eczema, Fracture, metacarpal, Gastric ulcer, Gastritis, GERD (gastroesophageal reflux disease), GI bleed, H. 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       has a past surgical history that includes Colonoscopy; bronchoscopy; other surgical history; Upper gastrointestinal endoscopy (2/4/16); pr egd transoral biopsy single/multiple (N/A, 3/20/2017); sigmoidoscopy (N/A,  3/20/2017); Cholecystectomy, laparoscopic (07/14/2017); pr esophagogastroduodenoscopy transoral diagnostic (N/A, 8/9/2017); pr colonoscopy w/biopsy single/multiple (8/9/2017); Abdomen surgery; Upper gastrointestinal endoscopy (N/A, 6/13/2018); Upper gastrointestinal endoscopy (N/A, 9/20/2018); and Endoscopy, colon, diagnostic.      Social History     Socioeconomic History    Marital status: Single     Spouse name: Not on file    Number of children: Not on file    Years of education: Not on file    Highest education level: Not on file   Occupational History    Occupation: disability   Tobacco Use    Smoking status: Every Day     Current packs/day: 0.50     Average packs/day: 0.5 packs/day for 15.0 years (7.5 ttl pk-yrs)     Types: Cigarettes    Smokeless tobacco: Never   Vaping Use    Vaping Use: Former   Substance and Sexual Activity    Alcohol use: Not Currently     Comment: drinks daily    Drug use: Yes     Types: Cocaine     Comment: Currently on suboxone. Hx of opiates, benzos, cocaine, alcohol abuse    Sexual activity: Yes     Partners: Female     Comment: Lives alone, not working.   Other Topics Concern    Not on file   Social History Narrative    ** Merged History Encounter **          Social Determinants of Health     Financial Resource Strain: Medium Risk (11/20/2021)    Overall Financial Resource Strain (CARDIA)     Difficulty of Paying Living Expenses: Somewhat hard   Food Insecurity: No Food Insecurity (11/20/2021)    Hunger Vital Sign     Worried About Running Out of Food in the Last Year: Never true     Ran Out of Food in the Last Year: Never true   Transportation Needs: No Transportation Needs (11/20/2021)    PRAPARE - Transportation     Lack of Transportation (Medical): No     Lack of Transportation (Non-Medical): No   Physical Activity: Inactive (11/20/2021)    Exercise Vital Sign     Days of Exercise per Week: 0 days     Minutes of Exercise per Session: 0 min   Stress: Stress Concern Present

## 2024-01-04 NOTE — ED PROVIDER NOTES
[facility]     Emergency Department     Faculty Attestation    I performed a history and physical examination of the patient and discussed management with the resident. I have reviewed and agree with the resident’s findings including all diagnostic interpretations, and treatment plans as written. Any areas of disagreement are noted on the chart. I was personally present for the key portions of any procedures. I have documented in the chart those procedures where I was not present during the key portions. I have reviewed the emergency nurses triage note. I agree with the chief complaint, past medical history, past surgical history, allergies, medications, social and family history as documented unless otherwise noted below. Documentation of the HPI, Physical Exam and Medical Decision Making performed by scribmargo is based on my personal performance of the HPI, PE and MDM. For Physician Assistant/ Nurse Practitioner cases/documentation I have personally evaluated this patient and have completed at least one if not all key elements of the E/M (history, physical exam, and MDM). Additional findings are as noted.    Note Started: 3:13 AM EST     32 yo M request his hands to be checked, pt did not allow me to see his feet, no fever, no vomit, no injury  PE vss, gcs 15 clear speech, neck supple, no meningeal findings, hands grossly normal, no deformity, neurovascular intact, brisk capillary refill    Pt refused temp, I feel pt stable for out pt tx,   Patient exhibiting malingering type behavior    EKG Interpretation    Interpreted by me      CRITICAL CARE: There was a high probability of clinically significant/life threatening deterioration in this patient's condition which required my urgent intervention.  Total critical care time was 0 minutes.  This excludes any time for separately reportable procedures.       Clifford Andres DO  01/04/24

## 2024-01-04 NOTE — DISCHARGE INSTRUCTIONS
You need to call the number below to make an appointment as directed for follow up.    Should you have any questions regarding your care or further treatment, please call [facility] Emergency Department at [phone].

## 2024-01-04 NOTE — ED PROVIDER NOTES
me as: N/A    See more data below for the lab and radiology tests and orders.    3)  Treatment and Disposition    Patient repeat assessment: Stable.  Provided medications he requested.  No further workup indicated at this time.    Disposition discussion with patient/family: Patient aware and agrees with disposition plan.    Case discussed with consulting clinician:  N/A    MIPS:  N/A    Social determinants of health impacting treatment or disposition:  None    Shared Decision Making completed with patient regarding risks and benefits of admission versus discharge.  Patient decides to be discharged home.    Code Status Discussion:  Not discussed    \"ED Course\" Notes From Epic Narrator:     All questions answered.  Given strict return precautions.  No further work-up indicated at this time.      CRITICAL CARE:   N/A    PROCEDURES:  Procedures      DATA FOR LAB AND RADIOLOGY TESTS ORDERED BELOW ARE REVIEWED BY THE ED CLINICIAN:    RADIOLOGY: All x-rays, CT, MRI, and formal ultrasound images (except ED bedside ultrasound) are read by the radiologist, see reports below, unless otherwise noted in MDM or here.  Reports below are reviewed by myself.  No orders to display       LABS: Lab orders shown below, the results are reviewed by myself, and all abnormals are listed below.  Labs Reviewed - No data to display    Vitals Reviewed:    Vitals:    01/03/24 2040   BP: 132/86   Pulse: 84   Resp: 18   Temp: 97.9 °F (36.6 °C)   TempSrc: Oral   SpO2: 97%   Height: 1.702 m (5' 7.01\")     MEDICATIONS GIVEN TO PATIENT THIS ENCOUNTER:  Orders Placed This Encounter   Medications    QUEtiapine (SEROQUEL) 100 MG tablet     Sig: Take 1 tablet by mouth at bedtime     Dispense:  30 tablet     Refill:  0    ALPRAZolam (XANAX) 0.5 MG tablet     Sig: Take 1 tablet by mouth 3 times daily as needed for Sleep for up to 10 days. Max Daily Amount: 1.5 mg     Dispense:  10 tablet     Refill:  0    ibuprofen (ADVIL;MOTRIN) 600 MG tablet     Sig: Take

## 2024-01-04 NOTE — ED NOTES
Pt requested transport be booked to [facility] via his insurance. SW asked pt what home address insurance would have on file as well as phone number, and pt reports \"you're the , you should know that\". SW informed pt she would need the address to book transport, and pt reports \"it's [facility]'s address they should have\". SW contacted pt insurance and they report that [facility]'s address and the phone number are not what they have on file, and until pt updates demographic information, they are unable to book him transport at this time. DEZ informed pt of update.

## 2024-01-05 NOTE — ED PROVIDER NOTES
EMERGENCY DEPARTMENT ENCOUNTER    Pt Name: Jose Kaplan  MRN: [id number]  Birthdate 1990  Date of evaluation: 1/4/24  CHIEF COMPLAINT       Chief Complaint   Patient presents with    Hand Pain     Pt states he \"was not able to fill all of his scripts because he was only given the zofran script, and he would have gotten the rest filled but he wasn't given the other scripts.\"     HISTORY OF PRESENT ILLNESS   The history is provided by the patient and medical records.   Patient returns the ED requesting medication refill that was provided to him last evening by me.  Patient handed Zofran prescription and E scripted Seroquel, ibuprofen and alprazolam.  Unclear where the confusion lies.      REVIEW OF SYSTEMS     Review of Systems  All other systems reviewed and are negative.    PASTMEDICAL HISTORY     Past Medical History:   Diagnosis Date    Anxiety     Arthritis     Asthma     Bipolar I disorder, most recent episode (or current) depressed, unspecified 9/12/2014    Clostridium difficile infection     COPD (chronic obstructive pulmonary disease) (HCC)     Depression     Disease of blood and blood forming organ     Eczema     Fracture, metacarpal     R 4th and 5th    Gastric ulcer     Gastritis 06/13/2018    GERD (gastroesophageal reflux disease)     GI bleed     H. 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

## 2024-01-08 NOTE — ED PROVIDER NOTES
[facility]NCOUN[facility]      Pt Name: Mady Wilson  MRN: [id number]  [place] 1990  Date of evaluation: 1/7/22      CHIEF COMPLAINT       Chief Complaint   Patient presents with    Suicidal    Mental Health Problem     HISTORY OF PRESENT ILLNESS   HPI 32 y.o. male presents with c/o suicidal thoughts. The patient brought himself to the emergency department. The patient reports feeling depressed, overwhelmed and having thought of suicide for the last few days. The patient reports he tried to overdose on fentanyl a few days ago that was not successful. He states he has been  Thinking of cutting his throat. The patient denies a h/o of previous suicide attempt. The patient reports that their symptoms were provoked by his girlfriend leaving him. The patient does have a h/o of previous psychiatric admission most recently in November of 2021. The patient denies drug use outside of the recent fentanyl use. The patient denies medical complaints at this time. REVIEW OF SYSTEMS     Review of Systems   Constitutional: Negative for fever. HENT: Negative for congestion. Eyes: Negative for visual disturbance. Respiratory: Negative for cough. Cardiovascular: Negative for chest pain. Gastrointestinal: Negative for abdominal pain. Genitourinary: Negative for difficulty urinating. Musculoskeletal: Negative for back pain. Skin: Negative for rash. Psychiatric/Behavioral: Positive for dysphoric mood, self-injury, sleep disturbance and suicidal ideas. The patient is nervous/anxious.           PAST MEDICAL HISTORY     Past Medical History:   Diagnosis Date    Anxiety     Arthritis     Asthma     Bipolar I disorder, most recent episode (or current) depressed, unspecified 9/12/2014    Clostridium difficile infection     [facility]     Depression     Disease of blood and blood forming organ     Eczema     Fracture, metacarpal     R 4th and 5th    Gastric ulcer     Gastritis 06/13/2018    GERD (gastroesophageal reflux disease)     GI bleed     H. 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[facility]     Substance abuse ([facility]     Hx of opiates, benzos, cocaine, alcohol abuse    Suicidal ideation     Suicide attempt by hanging ([address]     Tobacco dependence     Ulcerative colitis ([address]     UTI (urinary tract infection)        SURGICAL HISTORY       Past Surgical History:   Procedure Laterality Date    ABDOMEN SURGERY      upper GI scope 7/7/2015    BRONCHOSCOPY      CHOLECYSTECTOMY, LAPAROSCOPIC  07/14/2017    surgery performed at [address] [place] [address], COLON, DIAGNOSTIC      OTHER SURGICAL HISTORY      lumbar puncture    MD COLONOSCOPY W/BIOPSY SINGLE/MULTIPLE  8/9/2017    COLONOSCOPY WITH BIOPSY performed by Russ Camp MD at [facility] EGD TRANSORAL BIOPSY SINGLE/MULTIPLE N/A 3/20/2017    EGD BIOPSY performed by Reji Guevara MD at [facility]    MD ESOPHAGOGASTRODUODENOSCOPY TRANSORAL DIAGNOSTIC N/A 8/9/2017    EGD ESOPHAGOGASTRODUODENOSCOPY performed by Russ Camp MD at [facility] N/A 3/20/2017    SIGMOIDOSCOPY DIAGNOSTIC FLEXIBLE performed by Reji Guevara MD at [address] [place] [address]  2/4/16    UPPER GASTROINTESTINAL ENDOSCOPY N/A 6/13/2018    GASTRITIS    UPPER GASTROINTESTINAL ENDOSCOPY N/A 9/20/2018    EGD BIOPSY performed by Fish Mariano MD at [facility]       Current Discharge Medication List      CONTINUE these medications which have NOT CHANGED    Details   ALPRAZolam (XANAX) 0.25 MG tablet Take 1 tablet by mouth 3 times daily as needed for Anxiety for up to 30 days. Qty: 10 tablet, Refills: 0    Associated Diagnoses: Anxiety      buprenorphine-naloxone (SUBOXONE) 8-2 MG FILM SL film Place 1 Film under the tongue 2 times daily for 7 days. Qty: 14 Film, Refills: 0    Comments: [id number]  Associated Diagnoses: Severe opioid use disorder (HCC)      chlorproMAZINE (THORAZINE) 50 MG tablet Take 1 tablet by mouth 2 times daily as needed (anxiety)  Qty: 10 tablet, Refills: 0      cloNIDine (CATAPRES) 0.1 MG tablet Take 1 tablet by mouth 3 times daily for 7 days  Qty: 21 tablet, Refills: 0    Associated Diagnoses: Schizoaffective disorder, bipolar type (HCC)      gabapentin (NEURONTIN) 800 MG tablet Take 1 tablet by mouth 4 times daily for 7 days. Qty: 28 tablet, Refills: 0      metFORMIN (GLUCOPHAGE) 500 MG tablet Take 1 tablet by mouth 2 times daily (with meals)  Qty: 60 tablet, Refills: 0      pantoprazole (PROTONIX) 40 MG tablet Take 1 tablet by mouth every morning (before breakfast)  Qty: 30 tablet, Refills: 0      promethazine (PHENERGAN) 25 MG tablet Take 1 tablet by mouth 4 times daily as needed for Nausea  Qty: 10 tablet, Refills: 0      QUEtiapine (SEROQUEL) 100 MG tablet Take 1 tablet by mouth nightly for 7 days  Qty: 7 tablet, Refills: 0      sucralfate (CARAFATE) 1 GM tablet Take 1 tablet by mouth 4 times daily  Qty: 120 tablet, Refills: 3      traZODone (DESYREL) 50 MG tablet Take 1 tablet by mouth nightly as needed for Sleep  Qty: 30 tablet, Refills: 0             ALLERGIES     is allergic to dicyclomine, famotidine, geodon [ziprasidone hcl], haloperidol, iv dye [iodides], reglan [metoclopramide], ibuprofen, aspirin, dicyclomine hcl, hydroxyzine hcl, iodine, metronidazole, mirtazapine, promethazine, and ziprasidone. FAMILY HISTORY     He indicated that his mother is alive.  He indicated that his father is alive. He indicated that the status of his sister is unknown. He indicated that the status of his other is unknown. He indicated that the status of his paternal cousin is unknown. SOCIAL HISTORY      reports that he has been smoking cigarettes. He has a 7.50 pack-year smoking history. He has never used smokeless tobacco. He reports current alcohol use. He reports current drug use. Drugs: Opiates  and Cocaine. PHYSICAL EXAM     INITIAL VITALS: BP (!) 139/97   Pulse 67   Temp 98 °F (36.7 °C) (Oral)   Resp 16   Ht 5' 9\" (1.753 m)   Wt 220 lb (99.8 kg)   SpO2 97%   BMI 32.49 kg/m²   Gen: NAD  Head: Normocephalic, atraumatic  Eye: Pupils equal round reactive to light, no conjunctivitis  Heart: Regular rate and rhythm no murmurs  Lungs: Clear to auscultation bilaterally, no respiratory distress  Chest wall: No crepitus, no tenderness palpation  Abdomen: Soft, nontender, nondistended, with no peritoneal signs  Skin: No diaphoresis. no lacerations. Neurologic: Patient is alert and oriented x3, motor and sensation is intact in all 4 extremities, speech is fluent  Extremities: Full range of motion, no cyanosis, no edema, no signs of trauma, no tenderness to palpation    MEDICAL DECISION MAKING:     MDM  32 y.o. male with depression, suicidal thoughts, suicidal plan. suicide attempt. The patient does not appear intoxicated. The patient is showing no signs of any acute active toxidrome. We'll screen for any acetaminophen or salicylate ingestion,  baseline renal function, liver function, cbc checked earlier today and WNL. Emergency Department course:    Laboratory studies reviewed and unremarkable. Patient is medically clear for psychiatric evaluation.  will discuss the case with the psychiatry service, anticipate inpatient psychiatric admission. DIAGNOSTIC RESULTS   LABS: All lab results were reviewed by myself, and all abnormals are listed below.   Labs Reviewed   URINE DRUG SCREEN - Abnormal; Notable for the following components:       Result Value    Barbiturate Screen, Ur POSITIVE (*)     Benzodiazepine Screen, Urine POSITIVE (*)     Cocaine Metabolite, Urine POSITIVE (*)     All other components within normal limits   ACETAMINOPHEN LEVEL - Abnormal; Notable for the following components:    Acetaminophen Level <5 (*)     All other components within normal limits   SALICYLATE LEVEL - Abnormal; Notable for the following components:    Salicylate Lvl <1 (*)     All other components within normal limits   COVID-19, RAPID   ETHANOL       EMERGENCY DEPARTMENT COURSE:   Vitals:    Vitals:    01/07/22 1020 01/07/22 1430   BP: 120/73 (!) 139/97   Pulse: 73 67   Resp: 18 16   Temp: 97.9 °F (36.6 °C) 98 °F (36.7 °C)   TempSrc:  Oral   SpO2: 97%    Weight:  220 lb (99.8 kg)   Height:  5' 9\" (1.753 m)       The patient was given the following medications while in the emergency department:  Orders Placed This Encounter   Medications    buprenorphine-naloxone (SUBOXONE) 8-2 MG SL film 1 Film    chlorproMAZINE (THORAZINE) tablet 50 mg    gabapentin (NEURONTIN) capsule 800 mg    metFORMIN (GLUCOPHAGE) tablet 500 mg    pantoprazole (PROTONIX) tablet 40 mg    sucralfate (CARAFATE) tablet 1 g    hydrOXYzine (ATARAX) tablet 50 mg    traZODone (DESYREL) tablet 50 mg    OLANZapine (ZYPREXA) tablet 10 mg    AND Linked Order Group     OLANZapine (ZYPREXA) injection 5 mg     sterile water injection 2.1 mL    nicotine polacrilex (NICORETTE) gum 2 mg     -------------------------  CRITICAL CARE:   CONSULTS: IP CONSULT TO INTERNAL MEDICINE  PROCEDURES: Procedures     FINAL IMPRESSION      1. Depression with suicidal ideation          DISPOSITION/PLAN   DISPOSITION Admitted 01/07/2022 01:31:01 PM      PATIENT REFERRED TO:  No follow-up provider specified.     DISCHARGE MEDICATIONS:  Current Discharge Medication List            Jayesh Cortez MD  Attending Emergency Physician Osbaldo Kleley MD  01/07/22 Pauline Fothergill Ashley Mack

## 2024-01-12 ENCOUNTER — HOSPITAL ENCOUNTER (EMERGENCY)
Age: 34
Discharge: HOME | End: 2024-01-12
Attending: STUDENT IN AN ORGANIZED HEALTH CARE EDUCATION/TRAINING PROGRAM
Payer: COMMERCIAL

## 2024-01-12 VITALS — BODY MASS INDEX: 28.19 KG/M2 | WEIGHT: 180 LBS | HEART RATE: 90 BPM | OXYGEN SATURATION: 97 %

## 2024-01-12 DIAGNOSIS — Z00.8 ENCOUNTER FOR MEDICAL ASSESSMENT: Primary | ICD-10-CM

## 2024-01-12 PROCEDURE — 99282 EMERGENCY DEPT VISIT SF MDM: CPT

## 2024-01-12 NOTE — ED PROVIDER NOTES
clinician, MIPS, Social determinants of health impacting treatment or disposition, Shared Decision Making, Code Status Discussion:]    No swelling, skin breakdown, ulcerations, signs of infection visualized over exposed skin on hands.  Encouraged to wear gloves as able and attempt to remain warm.  Based on the low acuity of concerning symptoms and improvement of symptoms, patient will be discharged with follow up and prescription information listed in the Disposition section.  Patient states they will follow-up with primary care physician and/or return to the emergency department should they experience a change or worsening of symptoms.  Patient will be discharged with resources: summary of visit, instructions, follow-up information, prescriptions if necessary.  Patient/ family instructed to read discharge paperwork. All of their questions and concerns were addressed.   Suspicion for any acute life-threatening processes is low.   Patient voices understanding of plan.      DATA FOR LAB AND RADIOLOGY TESTS ORDERED BELOW ARE REVIEWED BY THE ED CLINICIAN:    RADIOLOGY: All x-rays, CT, MRI, and formal ultrasound images (except ED bedside ultrasound) are read by the radiologist, see reports below, unless otherwise noted in MDM or here.  Reports below are reviewed by myself.  No orders to display       LABS: Lab orders shown below, the results are reviewed by myself, and all abnormals are listed below.  Labs Reviewed - No data to display    Vitals Reviewed:    Vitals:    01/12/24 0048   Pulse: 90   SpO2: 97%   Weight: 81.6 kg (180 lb)     MEDICATIONS GIVEN TO PATIENT THIS ENCOUNTER:  No orders of the defined types were placed in this encounter.    DISCHARGE PRESCRIPTIONS:  Discharge Medication List as of 1/12/2024  1:40 AM        PHYSICIAN CONSULTS ORDERED THIS ENCOUNTER:  None    ED Course Notes From Epic Narrator:         CRITICAL CARE:   0    PROCEDURES:  none    FINAL IMPRESSION      1. Encounter for medical assessment

## 2024-01-15 ENCOUNTER — HOSPITAL ENCOUNTER (EMERGENCY)
Age: 34
Discharge: HOME | End: 2024-01-15
Attending: EMERGENCY MEDICINE
Payer: COMMERCIAL

## 2024-01-15 VITALS
DIASTOLIC BLOOD PRESSURE: 93 MMHG | RESPIRATION RATE: 16 BRPM | WEIGHT: 200 LBS | BODY MASS INDEX: 31.39 KG/M2 | HEART RATE: 100 BPM | HEIGHT: 67 IN | OXYGEN SATURATION: 97 % | SYSTOLIC BLOOD PRESSURE: 153 MMHG

## 2024-01-15 DIAGNOSIS — M79.642 PAIN IN BOTH HANDS: Primary | ICD-10-CM

## 2024-01-15 DIAGNOSIS — M79.641 PAIN IN BOTH HANDS: Primary | ICD-10-CM

## 2024-01-15 PROCEDURE — 99282 EMERGENCY DEPT VISIT SF MDM: CPT

## 2024-01-16 ENCOUNTER — HOSPITAL ENCOUNTER (EMERGENCY)
Age: 34
Discharge: HOME | End: 2024-01-16
Attending: EMERGENCY MEDICINE
Payer: COMMERCIAL

## 2024-01-16 VITALS
SYSTOLIC BLOOD PRESSURE: 126 MMHG | RESPIRATION RATE: 16 BRPM | DIASTOLIC BLOOD PRESSURE: 78 MMHG | OXYGEN SATURATION: 97 % | TEMPERATURE: 97.9 F | HEART RATE: 100 BPM

## 2024-01-16 DIAGNOSIS — Z76.0 ENCOUNTER FOR MEDICATION REFILL: Primary | ICD-10-CM

## 2024-01-16 DIAGNOSIS — F23 ACUTE PSYCHOSIS (HCC): ICD-10-CM

## 2024-01-16 DIAGNOSIS — F33.2 MDD (MAJOR DEPRESSIVE DISORDER), RECURRENT SEVERE, WITHOUT PSYCHOSIS (HCC): ICD-10-CM

## 2024-01-16 DIAGNOSIS — F25.0 SCHIZOAFFECTIVE DISORDER, BIPOLAR TYPE (HCC): Chronic | ICD-10-CM

## 2024-01-16 PROCEDURE — 99283 EMERGENCY DEPT VISIT LOW MDM: CPT

## 2024-01-16 RX ORDER — ALPRAZOLAM 0.5 MG/1
0.5 TABLET ORAL NIGHTLY PRN
Qty: 6 TABLET | Refills: 0 | Status: SHIPPED | OUTPATIENT
Start: 2024-01-16 | End: 2024-01-22

## 2024-01-16 RX ORDER — QUETIAPINE FUMARATE 100 MG/1
100 TABLET, FILM COATED ORAL NIGHTLY
Qty: 10 TABLET | Refills: 0 | Status: SHIPPED | OUTPATIENT
Start: 2024-01-16 | End: 2024-02-15

## 2024-01-16 RX ORDER — ONDANSETRON 4 MG/1
4 TABLET, FILM COATED ORAL EVERY 8 HOURS PRN
Qty: 12 TABLET | Refills: 0 | Status: SHIPPED | OUTPATIENT
Start: 2024-01-16

## 2024-01-16 RX ORDER — ACETAMINOPHEN 500 MG
500 TABLET ORAL EVERY 6 HOURS PRN
Qty: 12 TABLET | Refills: 0 | Status: SHIPPED | OUTPATIENT
Start: 2024-01-16

## 2024-01-16 ASSESSMENT — ENCOUNTER SYMPTOMS
SHORTNESS OF BREATH: 0
ABDOMINAL PAIN: 0
BACK PAIN: 0
DIARRHEA: 0
VOMITING: 0
COLOR CHANGE: 0

## 2024-01-16 NOTE — DISCHARGE INSTRUCTIONS
Take meds as prescribed.  Follow outpatient tomorrow with doctor or by calling [phone] or [phone].   Return to ER immediately if symptoms worsen or persist.

## 2024-01-16 NOTE — ED NOTES
Pt presenting to the ED with complaints of tingling in hands and feet over the last few days. Pt is currently homeless and has been outside.

## 2024-01-16 NOTE — ED PROVIDER NOTES
[facility] ED  eMERGENCY dEPARTMENTeNCOUnter      Pt Name: Jose Kaplan  MRN: [id number]  Birthdate 1990  Date ofevaluation: 1/15/2024  Provider: Topher Daley PA-C    CHIEF COMPLAINT       Chief Complaint   Patient presents with    Tingling     Px complains of numbness and tingling in hands and feet past few days. Has been outside quite frequently         HISTORY OF PRESENT ILLNESS  (Location/Symptom, Timing/Onset, Context/Setting, Quality, Duration, Modifying Factors, Severity.)   Jose Kaplan is a 33 y.o. male who presents to the emergency department with reports numbing and tingling hands and feet the past few days.  Patient is homeless.  Standing states remission.  Patient denies any fevers or chills.  No nausea or vomiting.  Denies any pain in his hands or feet at this point in time.  No signs of frostbite on physical exam.  Good capillary refill in  all 10 fingers.      Nursing Notes were reviewed.    ALLERGIES     Dicyclomine, Famotidine, Geodon [ziprasidone hcl], Haloperidol, Iv dye [iodides], Reglan [metoclopramide], Ibuprofen, Aspirin, Dicyclomine hcl, Hydroxyzine hcl, Iodine, Metronidazole, Mirtazapine, Ondansetron hcl, Promethazine, and Ziprasidone    CURRENT MEDICATIONS       Previous Medications    ACETAMINOPHEN (TYLENOL) 500 MG TABLET    Take 1 tablet by mouth 4 times daily as needed for Pain    BENZOCAINE-MENTHOL (CEPACOL) 6-10 MG LOZG LOZENGE    Take 1 lozenge by mouth every 2 hours as needed for Sore Throat    BUPRENORPHINE-NALOXONE (SUBOXONE) 8-2 MG FILM SL FILM        CARBAMAZEPINE (TEGRETOL) 200 MG TABLET        CLONIDINE (CATAPRES) 0.1 MG TABLET        CYCLOBENZAPRINE (FLEXERIL) 10 MG TABLET    Take 1 tablet by mouth 3 times daily as needed for Muscle spasms    DICLOFENAC SODIUM (VOLTAREN) 1 % GEL    Apply 4 g topically 4 times daily    GABAPENTIN (NEURONTIN) 300 MG CAPSULE        IBUPROFEN (ADVIL;MOTRIN) 600 MG TABLET    Take 1 tablet by mouth 3 times daily as

## 2024-01-17 NOTE — ED PROVIDER NOTES
K29.70    Generalized abdominal pain R10.84    Anemia D64.9    Elevated liver enzymes R74.8    Nausea and vomiting R11.2    Opioid type dependence, continuous ([facility]) F11.20    Abdominal pain R10.9    Diarrhea R19.7    Irritable bowel syndrome with both constipation and diarrhea K58.2    Schizoaffective disorder, bipolar type ([facility]) F25.0    GI bleed K92.2    Depression with suicidal ideation F32.A, R45.851    Schizoaffective disorder ([facility]) F25.9    MDD (major depressive disorder), recurrent severe, without psychosis ([facility]) F33.2    Severe episode of recurrent major depressive disorder, without psychotic features ([facility]) F33.2    Diabetes mellitus type 2 in obese ([facility]) E11.69, E66.9    Mixed hyperlipidemia E78.2    Cocaine abuse ([facility]) F14.10    Acute psychosis ([facility]) F23    PUD (peptic ulcer disease) K27.9    Gastroesophageal reflux disease without esophagitis K21.9    Prediabetes R73.03    Acute respiratory failure with hypoxia ([facility]) J96.01    Rhabdomyolysis M62.82    Acute encephalopathy G93.40    PHI (acute kidney injury) ([facility]) N17.9    High anion gap metabolic acidosis E87.29         DISPOSITION/PLAN   DISPOSITION Decision To Discharge 01/16/2024 07:41:17 PM      PATIENT REFERRED TO:   [facility] ED  [address] [id number] [phone]    If symptoms worsen, As needed    Center, [facility]  [address] [facility] [id number] [phone]    Schedule an appointment as soon as possible for a visit       Group, [facility] [address] [place] [address] [phone]    Schedule an appointment as soon as possible for a visit         DISCHARGE MEDICATIONS:     Discharge Medication List as of 1/16/2024  7:53 PM        START taking these medications    Details   ondansetron (ZOFRAN) 4 MG tablet Take 1 tablet by mouth every 8 hours as needed for Nausea or Vomiting, Disp-12 tablet, R-0Print      !! acetaminophen (TYLENOL) 500 MG tablet Take 1 tablet by mouth every 6 hours as needed for Pain or

## 2024-01-22 ENCOUNTER — HOSPITAL ENCOUNTER (EMERGENCY)
Age: 34
Discharge: HOME | End: 2024-01-22
Attending: EMERGENCY MEDICINE
Payer: COMMERCIAL

## 2024-01-22 VITALS
HEIGHT: 67 IN | DIASTOLIC BLOOD PRESSURE: 101 MMHG | SYSTOLIC BLOOD PRESSURE: 127 MMHG | HEART RATE: 119 BPM | BODY MASS INDEX: 29.82 KG/M2 | OXYGEN SATURATION: 97 % | WEIGHT: 190 LBS

## 2024-01-22 DIAGNOSIS — M79.642 BILATERAL HAND PAIN: ICD-10-CM

## 2024-01-22 DIAGNOSIS — M79.641 BILATERAL HAND PAIN: ICD-10-CM

## 2024-01-22 DIAGNOSIS — F25.0 SCHIZOAFFECTIVE DISORDER, BIPOLAR TYPE (HCC): Chronic | ICD-10-CM

## 2024-01-22 DIAGNOSIS — Z76.0 ENCOUNTER FOR MEDICATION REFILL: Primary | ICD-10-CM

## 2024-01-22 DIAGNOSIS — F33.2 MDD (MAJOR DEPRESSIVE DISORDER), RECURRENT SEVERE, WITHOUT PSYCHOSIS (HCC): ICD-10-CM

## 2024-01-22 PROCEDURE — 99283 EMERGENCY DEPT VISIT LOW MDM: CPT

## 2024-01-22 RX ORDER — ACETAMINOPHEN 500 MG
500 TABLET ORAL EVERY 6 HOURS PRN
Qty: 15 TABLET | Refills: 0 | Status: SHIPPED | OUTPATIENT
Start: 2024-01-22

## 2024-01-22 RX ORDER — ALPRAZOLAM 0.5 MG/1
0.5 TABLET ORAL NIGHTLY PRN
Qty: 7 TABLET | Refills: 0 | Status: SHIPPED | OUTPATIENT
Start: 2024-01-22 | End: 2024-01-29

## 2024-01-22 RX ORDER — QUETIAPINE FUMARATE 100 MG/1
100 TABLET, FILM COATED ORAL NIGHTLY
Qty: 7 TABLET | Refills: 0 | Status: SHIPPED | OUTPATIENT
Start: 2024-01-22 | End: 2024-01-29

## 2024-01-22 RX ORDER — CYCLOBENZAPRINE HCL 10 MG
10 TABLET ORAL 3 TIMES DAILY PRN
Qty: 15 TABLET | Refills: 0 | Status: SHIPPED | OUTPATIENT
Start: 2024-01-22

## 2024-01-22 ASSESSMENT — PAIN DESCRIPTION - ORIENTATION: ORIENTATION: RIGHT

## 2024-01-22 ASSESSMENT — PAIN DESCRIPTION - DESCRIPTORS: DESCRIPTORS: ACHING

## 2024-01-22 ASSESSMENT — PAIN - FUNCTIONAL ASSESSMENT
PAIN_FUNCTIONAL_ASSESSMENT: 0-10
PAIN_FUNCTIONAL_ASSESSMENT: ACTIVITIES ARE NOT PREVENTED

## 2024-01-22 ASSESSMENT — PAIN SCALES - GENERAL: PAINLEVEL_OUTOF10: 7

## 2024-01-22 ASSESSMENT — PAIN DESCRIPTION - FREQUENCY: FREQUENCY: INTERMITTENT

## 2024-01-22 ASSESSMENT — PAIN DESCRIPTION - LOCATION: LOCATION: FOOT

## 2024-01-23 ASSESSMENT — ENCOUNTER SYMPTOMS
SHORTNESS OF BREATH: 0
DIARRHEA: 0
NAUSEA: 0
VOMITING: 0
COUGH: 0
ABDOMINAL PAIN: 0
BACK PAIN: 0

## 2024-01-23 NOTE — ED TRIAGE NOTES
Mode of arrival (squad #, walk in, police, etc) : walked in        Chief complaint(s): hand and foot pain        Arrival Note (brief scenario, treatment PTA, etc).: pt states would like to have his hands looked ay and some of his medications refilled prior to leaving [facility], unable to get into primary care dr to be seen        C= \"Have you ever felt that you should Cut down on your drinking?\"  No  A= \"Have people Annoyed you by criticizing your drinking?\"  No  G= \"Have you ever felt bad or Guilty about your drinking?\"  No  E= \"Have you ever had a drink as an Eye-opener first thing in the morning to steady your nerves or to help a hangover?\"  No      Deferred []      Reason for deferring: N/A    *If yes to two or more: probable alcohol abuse.*

## 2024-01-23 NOTE — DISCHARGE INSTRUCTIONS
Call [phone] ([phone]) to establish care for follow up.  You can also call [employer] at [phone] to establish care.

## 2024-01-23 NOTE — ED NOTES
Px arrives complaining of hand and foot pain   Px states that he has had this pain for a few months now   Px arrives appearing calm and free from any immediate distress

## 2024-01-23 NOTE — ED PROVIDER NOTES
[facility] ED  eMERGENCY dEPARTMENT eNCOUnter      Pt Name: Jose Kaplan  MRN: [id number]  Birthdate 1990  Date of evaluation: 1/22/2024  Provider: MASSIEL Salas CNP    CHIEF COMPLAINT       Chief Complaint   Patient presents with    Hand Pain         HISTORY OF PRESENT ILLNESS  (Location/Symptom, Timing/Onset, Context/Setting, Quality, Duration, Modifying Factors, Severity.)   Jose Kaplan is a 33 y.o. male who presents to the emergency department. Pt is requesting refills of four medications. He states he would like 7 days worth of medication. States he is hoping to get on a bus on Thursday 1/25/24 to go to [place], [place] or another [facility]. He is requesting refills of flexeril, tylenol, Seroquel, and xanax. Denies fever, chills, weakness. Denies CP, SOB. Reports bilateral hand pain due to his arthritis. Rates his pain 7/10 at this time.      Nursing Notes were reviewed.    ALLERGIES     Dicyclomine, Famotidine, Geodon [ziprasidone hcl], Haloperidol, Iv dye [iodides], Reglan [metoclopramide], Ibuprofen, Aspirin, Dicyclomine hcl, Hydroxyzine hcl, Iodine, Metronidazole, Mirtazapine, Ondansetron hcl, Promethazine, and Ziprasidone    CURRENT MEDICATIONS       Discharge Medication List as of 1/22/2024 11:23 PM        CONTINUE these medications which have NOT CHANGED    Details   ondansetron (ZOFRAN) 4 MG tablet Take 1 tablet by mouth every 8 hours as needed for Nausea or Vomiting, Disp-12 tablet, R-0Print      ibuprofen (ADVIL;MOTRIN) 600 MG tablet Take 1 tablet by mouth 3 times daily as needed for Pain, Disp-30 tablet, R-0Print      ondansetron (ZOFRAN-ODT) 4 MG disintegrating tablet Take 1 tablet by mouth 3 times daily as needed for Nausea or Vomiting, Disp-21 tablet, R-0Print      !! acetaminophen (TYLENOL) 500 MG tablet Take 1 tablet by mouth 4 times daily as needed for Pain, Disp-120 tablet, R-0Normal      metFORMIN (GLUCOPHAGE) 500 MG tablet Take 1 tablet by mouth 2 times daily

## 2024-01-25 ENCOUNTER — HOSPITAL ENCOUNTER (EMERGENCY)
Age: 34
Discharge: HOME | End: 2024-01-26
Attending: STUDENT IN AN ORGANIZED HEALTH CARE EDUCATION/TRAINING PROGRAM
Payer: COMMERCIAL

## 2024-01-25 VITALS
TEMPERATURE: 98.2 F | OXYGEN SATURATION: 98 % | DIASTOLIC BLOOD PRESSURE: 85 MMHG | RESPIRATION RATE: 16 BRPM | SYSTOLIC BLOOD PRESSURE: 132 MMHG | HEART RATE: 80 BPM

## 2024-01-25 DIAGNOSIS — Z76.5 MALINGERING: ICD-10-CM

## 2024-01-25 DIAGNOSIS — M79.641 PAIN IN BOTH HANDS: Primary | ICD-10-CM

## 2024-01-25 DIAGNOSIS — M79.642 PAIN IN BOTH HANDS: Primary | ICD-10-CM

## 2024-01-25 PROCEDURE — 99282 EMERGENCY DEPT VISIT SF MDM: CPT

## 2024-01-27 ENCOUNTER — HOSPITAL ENCOUNTER (EMERGENCY)
Age: 34
Discharge: HOME | End: 2024-01-27
Attending: EMERGENCY MEDICINE
Payer: COMMERCIAL

## 2024-01-27 ENCOUNTER — HOSPITAL ENCOUNTER (EMERGENCY)
Age: 34
Discharge: LEFT BEFORE DISCHARGE | End: 2024-01-27

## 2024-01-27 VITALS
HEART RATE: 105 BPM | SYSTOLIC BLOOD PRESSURE: 140 MMHG | TEMPERATURE: 97.7 F | RESPIRATION RATE: 16 BRPM | DIASTOLIC BLOOD PRESSURE: 95 MMHG | OXYGEN SATURATION: 95 %

## 2024-01-27 VITALS
TEMPERATURE: 98.5 F | HEART RATE: 86 BPM | OXYGEN SATURATION: 93 % | DIASTOLIC BLOOD PRESSURE: 85 MMHG | SYSTOLIC BLOOD PRESSURE: 137 MMHG | RESPIRATION RATE: 18 BRPM

## 2024-01-27 DIAGNOSIS — M79.673 HAND AND FOOT PAIN, UNSPECIFIED LATERALITY: Primary | ICD-10-CM

## 2024-01-27 DIAGNOSIS — M79.643 HAND AND FOOT PAIN, UNSPECIFIED LATERALITY: Primary | ICD-10-CM

## 2024-01-27 DIAGNOSIS — Z13.9 ENCOUNTER FOR MEDICAL SCREENING EXAMINATION: Primary | ICD-10-CM

## 2024-01-27 PROCEDURE — 6370000000 HC RX 637 (ALT 250 FOR IP): Performed by: EMERGENCY MEDICINE

## 2024-01-27 PROCEDURE — 99283 EMERGENCY DEPT VISIT LOW MDM: CPT

## 2024-01-27 PROCEDURE — 99282 EMERGENCY DEPT VISIT SF MDM: CPT

## 2024-01-27 RX ORDER — ACETAMINOPHEN 325 MG/1
650 TABLET ORAL ONCE
Status: COMPLETED | OUTPATIENT
Start: 2024-01-27 | End: 2024-01-27

## 2024-01-27 RX ADMIN — ACETAMINOPHEN 650 MG: 325 TABLET ORAL at 23:48

## 2024-01-27 ASSESSMENT — PAIN - FUNCTIONAL ASSESSMENT: PAIN_FUNCTIONAL_ASSESSMENT: NONE - DENIES PAIN

## 2024-01-27 ASSESSMENT — PAIN SCALES - GENERAL: PAINLEVEL_OUTOF10: 2

## 2024-01-27 NOTE — ED PROVIDER NOTES
[facility]   Emergency Department  Faculty Attestation       I performed a history and physical examination of the patient and discussed management with the resident. I reviewed the resident’s note and agree with the documented findings including all diagnostic interpretations and plan of care. Any areas of disagreement are noted on the chart. I was personally present for the key portions of any procedures. I have documented in the chart those procedures where I was not present during the key portions. I have reviewed the emergency nurses triage note. I agree with the chief complaint, past medical history, past surgical history, allergies, medications, social and family history as documented unless otherwise noted below.  For Physician Assistant/ Nurse Practitioner cases/documentation I have personally evaluated this patient and have completed at least one if not all key elements of the E/M (history, physical exam, and MDM). Additional findings are as noted.    Patient Name: Jose Kaplan  MRN: [id number]  : 1990  Primary Care Physician: No primary care provider on file.    Date of evaluationa: 2024   Note Started: 7:35 AM EST    Pertinent Comments     Chief Complaint:   Chief Complaint   Patient presents with    Medication Refill        Initial vitals: (If not listed, please see nursing documentation)  ED Triage Vitals [24 0408]   BP Temp Temp Source Pulse Respirations SpO2 Height Weight   (!) 140/95 97.7 °F (36.5 °C) Oral (!) 105 19 95 % -- --        HPI/PE/Impression:  This is a 33 y.o. male who presents to the Emergency Department initially stating to triage that he had hand pain.  Then stated to resident that he needed a bus pass and medication refill.  On my exam patient is stanidng up and walking around the room in no acute distress. States that nothing is bothering him and that he thinks his life is on the up and up.  I did inquire if there was anything that I could do

## 2024-01-27 NOTE — ED NOTES
Pt. Presents to the ED for a medication refill of his psych meds. Pt also looking for new placement at different facility. Pt does not wish to return to [facility]. Pt has no other complaints or requests. Pt a&ox4 with even and non labored resp. Will continue with plan of care.

## 2024-01-27 NOTE — ED PROVIDER NOTES
[facility] ED  Emergency Department Encounter  Emergency Medicine Resident     Pt Name:Jose Kaplan  MRN: [id number]  Birthdate 1990  Date of evaluation: 1/27/24  PCP:  No primary care provider on file.  Note Started: 4:30 AM EST      CHIEF COMPLAINT       Chief Complaint   Patient presents with    Medication Refill       HISTORY OF PRESENT ILLNESS  (Location/Symptom, Timing/Onset, Context/Setting, Quality, Duration, Modifying Factors, Severity.)      Jose Kaplan is a 33 y.o. male who presents with here requesting a [employer] bus ticket also requesting medication for every medicine he takes he is also requesting food she is hungry.  Patient denies any headache chest pain shortness of breath [place] pain nausea vomiting fevers or chills    PAST MEDICAL / SURGICAL / SOCIAL / FAMILY HISTORY      has a past medical history of Anxiety, Arthritis, Asthma, Bipolar I disorder, most recent episode (or current) depressed, unspecified, Clostridium difficile infection, COPD (chronic obstructive pulmonary disease) (HCC), Depression, Disease of blood and blood forming organ, Eczema, Fracture, metacarpal, Gastric ulcer, Gastritis, GERD (gastroesophageal reflux disease), GI bleed, H. pylori infection, H/O blood clots, Head injury, Headache, Insomnia, Juvenile rheumatoid arthritis (HCC), Neuromuscular disorder (HCC), PFAPA syndrome (HCC), PUD (peptic ulcer disease), Rheumatoid arthritis (HCC), Rheumatoid arthritis(714.0), Severe recurrent major depression without psychotic features (HCC), Sleep apnea, Still's disease (HCC), Substance abuse (HCC), Suicidal ideation, Suicide attempt by hanging ([facility]), Tobacco dependence, Ulcerative colitis (HCC), and UTI (urinary tract infection).     has a past surgical history that includes Colonoscopy; bronchoscopy; other surgical history; Upper gastrointestinal endoscopy (2/4/16); pr egd transoral biopsy single/multiple (N/A, 3/20/2017); sigmoidoscopy (N/A,  CARE:  See MDM    FINAL IMPRESSION      1. Encounter for medical screening examination          DISPOSITION / PLAN     DISPOSITION Decision To Discharge 01/27/2024 04:31:27 AM      PATIENT REFERRED TO:  [facility] ED  [address] [address]  [phone]    If symptoms worsen      DISCHARGE MEDICATIONS:  New Prescriptions    No medications on file       Salas Parson DO  Emergency Medicine Resident    (Please note that portions of thisnote were completed with a voice recognition program.  Efforts were made to edit the dictations but occasionally words are mis-transcribed.)

## 2024-01-27 NOTE — ED PROVIDER NOTES
eMERGENCY dEPARTMENT eNCOUnter   Independent Attestation     Pt Name: Jose Kaplan  MRN: [id number]  Birthdate 1990  Date of evaluation: 1/27/24     Jose Kaplan is a 33 y.o. male with CC: Hand Pain        This visit was performed by both a physician and an APC. I performed all aspects of the MDM as documented.      Elroy You MD  Attending Emergency Physician            Elroy You MD  01/27/24 0853

## 2024-01-28 NOTE — ED NOTES
Pt came into ED complaining of bilateral hand and feet pain that started earlier today. Pt denies any injury to hands or feet. Pt has full ROM of extremities, and is able to ambulate without assistance. Pt denies chest pain, sob, or vision changes. Pt is alert and orientedx4. Pt vitals are stable, on room air, will continue to monitor

## 2024-01-28 NOTE — ED PROVIDER NOTES
[facility] EMERGENCY DEPARTMENT ENCOUNTER      Pt Name: Jose Kaplan  MRN: [id number]  Birthdate 1990  Date of evaluation: 1/28/24    CHIEF COMPLAINT       Chief Complaint   Patient presents with    Hand Pain    Foot Pain       HISTORY OF PRESENT ILLNESS   Jose Kaplan is a 33 y.o. male who presents with pain to the hands.  Well-known to the emergency department homeless.  Requesting something for anxiety.  Long drug use history as well.    PASTMEDICAL HISTORY     Past Medical History:   Diagnosis Date    Anxiety     Arthritis     Asthma     Bipolar I disorder, most recent episode (or current) depressed, unspecified 9/12/2014    Clostridium difficile infection     COPD (chronic obstructive pulmonary disease) (HCC)     Depression     Disease of blood and blood forming organ     Eczema     Fracture, metacarpal     R 4th and 5th    Gastric ulcer     Gastritis 06/13/2018    GERD (gastroesophageal reflux disease)     GI bleed     H. 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[facility])     Substance abuse ([facility])     Hx of opiates, benzos, cocaine, alcohol abuse    Suicidal ideation     Suicide attempt by hanging ([facility])     Tobacco dependence     Ulcerative colitis ([facility])     UTI (urinary tract infection)      Past Problem List  Patient Active Problem List   Diagnosis Code    Opioid abuse ([facility]) F11.10    Noncompliance Z91.199    Rectal bleeding K62.5    Smoker F17.200    Juvenile rheumatoid arthritis (HCC) M08.00    SRIRAM (obstructive sleep apnea) G47.33    Primary insomnia F51.01    Calculus of bile duct with acute on chronic cholecystitis K80.46    Mild intermittent asthma without

## 2024-01-28 NOTE — ED PROVIDER NOTES
General: Skin is warm and dry.   Neurological:      Mental Status: He is alert and oriented to person, place, and time.         MEDICAL DECISION MAKING / ED COURSE:   Summary of Patient Presentation:        1)  Number and Complexity of Problems  Problem List This Visit: Bilateral hand and foot pain    Differential Diagnosis: Arthritis, cold exposure, trench foot, callusing    Diagnoses Considered but Do Not Suspect: Patient has normal distal pulses.  There are no signs of significant injury.  Skin is intact.    Pertinent Comorbid Conditions: Homeless, chronic cold exposure, substance abuse history      3)  Treatment and Disposition    Nondistressed 33-year-old male presenting to the emergency room for foot and hand pain.  Patient has overall benign physical exam.  We discussed anti-inflammatory medication which patient is reportedly allergic to.  Voltaren gel prescription ordered for the patient.      \"ED Course\" Notes From Epic Narrator:         CRITICAL CARE:       PROCEDURES:    Procedures      DATA FOR LAB AND RADIOLOGY TESTS ORDERED BELOW ARE REVIEWED BY THE ED CLINICIAN:    RADIOLOGY: All x-rays, CT, MRI, and formal ultrasound images (except ED bedside ultrasound) are read by the radiologist, see reports below, unless otherwise noted in MDM or here.  Reports below are reviewed by myself.  No orders to display       LABS: Lab orders shown below, the results are reviewed by myself, and all abnormals are listed below.  Labs Reviewed - No data to display    Vitals Reviewed:    Vitals:    01/27/24 2221   BP: 137/85   Pulse: (!) 125   Resp: 18   SpO2: 93%     MEDICATIONS GIVEN TO PATIENT THIS ENCOUNTER:  Orders Placed This Encounter   Medications    diclofenac sodium (VOLTAREN) 1 % GEL     Sig: Apply 4 g topically 4 times daily for 5 days     Dispense:  50 g     Refill:  0     DISCHARGE PRESCRIPTIONS:  New Prescriptions    DICLOFENAC SODIUM (VOLTAREN) 1 % GEL    Apply 4 g topically 4 times daily for 5 days

## 2024-01-29 ENCOUNTER — HOSPITAL ENCOUNTER (EMERGENCY)
Age: 34
Discharge: HOME | End: 2024-01-29
Attending: EMERGENCY MEDICINE
Payer: COMMERCIAL

## 2024-01-29 VITALS
WEIGHT: 190 LBS | TEMPERATURE: 98.7 F | OXYGEN SATURATION: 98 % | BODY MASS INDEX: 29.82 KG/M2 | HEIGHT: 67 IN | RESPIRATION RATE: 16 BRPM | HEART RATE: 86 BPM

## 2024-01-29 DIAGNOSIS — M25.512 ACUTE PAIN OF LEFT SHOULDER: Primary | ICD-10-CM

## 2024-01-29 PROCEDURE — 99283 EMERGENCY DEPT VISIT LOW MDM: CPT

## 2024-01-29 RX ORDER — IBUPROFEN 800 MG/1
800 TABLET ORAL ONCE
Status: DISCONTINUED | OUTPATIENT
Start: 2024-01-29 | End: 2024-01-29 | Stop reason: HOSPADM

## 2024-01-29 RX ORDER — ACETAMINOPHEN 500 MG
500 TABLET ORAL 4 TIMES DAILY PRN
Qty: 360 TABLET | Refills: 1 | Status: SHIPPED | OUTPATIENT
Start: 2024-01-29

## 2024-01-29 ASSESSMENT — PAIN - FUNCTIONAL ASSESSMENT: PAIN_FUNCTIONAL_ASSESSMENT: 0-10

## 2024-01-29 ASSESSMENT — PAIN SCALES - GENERAL: PAINLEVEL_OUTOF10: 5

## 2024-01-29 NOTE — ED NOTES
Patient has multiple trips to the ER for various complaints of pain. Patient states complaints of pain to L shoulder that began when he came to ER. Patient states no changes to his health since his last visit to the ED. Patient requests Tylenol and ginger ale. Patient prefers to keep his coat on instead of completing physical assessment today. Patient states he is thankful for medical staff in the ED for \"helping him all the time.\"

## 2024-01-29 NOTE — ED PROVIDER NOTES
EMERGENCY DEPARTMENT ENCOUNTER    Pt Name: Jose Kaplan  MRN: [id number]  Birthdate 1990  Date of evaluation: 1/29/24  CHIEF COMPLAINT       Chief Complaint   Patient presents with    Shoulder Pain     Left sided shoulder pain, pt states pain started today, denies any trauma to left shoulder     HISTORY OF PRESENT ILLNESS   33-year-old male presents emergency room with right arm discomfort.  Patient reported when he was walking around outside earlier his arm got a little sore.  He did not have any injuries.  He is moving it appropriately.  He reports now being inside he feels a little bit better.             REVIEW OF SYSTEMS     Review of Systems   Unable to perform ROS: Psychiatric disorder     PASTMEDICAL HISTORY     Past Medical History:   Diagnosis Date    Anxiety     Arthritis     Asthma     Bipolar I disorder, most recent episode (or current) depressed, unspecified 9/12/2014    Clostridium difficile infection     COPD (chronic obstructive pulmonary disease) ([facility])     Depression     Disease of blood and blood forming organ     Eczema     Fracture, metacarpal     R 4th and 5th    Gastric ulcer     Gastritis 06/13/2018    GERD (gastroesophageal reflux disease)     GI bleed     H. 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

## 2024-01-29 NOTE — ED NOTES
Patient offered snacks and drinks upon discharge, patient discharged to waiting room to call a cab.

## 2024-01-30 ENCOUNTER — HOSPITAL ENCOUNTER (EMERGENCY)
Age: 34
Discharge: HOME | End: 2024-01-30
Attending: STUDENT IN AN ORGANIZED HEALTH CARE EDUCATION/TRAINING PROGRAM
Payer: COMMERCIAL

## 2024-01-30 ENCOUNTER — HOSPITAL ENCOUNTER (EMERGENCY)
Age: 34
Discharge: HOME | End: 2024-01-31
Attending: EMERGENCY MEDICINE
Payer: COMMERCIAL

## 2024-01-30 VITALS — RESPIRATION RATE: 18 BRPM | OXYGEN SATURATION: 94 % | HEART RATE: 115 BPM

## 2024-01-30 VITALS
RESPIRATION RATE: 18 BRPM | OXYGEN SATURATION: 100 % | SYSTOLIC BLOOD PRESSURE: 119 MMHG | BODY MASS INDEX: 31.39 KG/M2 | HEART RATE: 110 BPM | TEMPERATURE: 98 F | HEIGHT: 67 IN | DIASTOLIC BLOOD PRESSURE: 91 MMHG | WEIGHT: 200 LBS

## 2024-01-30 DIAGNOSIS — F23 ACUTE PSYCHOSIS (HCC): Primary | ICD-10-CM

## 2024-01-30 DIAGNOSIS — Z76.0 ENCOUNTER FOR MEDICATION REFILL: ICD-10-CM

## 2024-01-30 DIAGNOSIS — F14.90 COCAINE USE: Primary | ICD-10-CM

## 2024-01-30 LAB
ALBUMIN SERPL-MCNC: 3.7 G/DL (ref 3.5–5.2)
ALP SERPL-CCNC: 57 U/L (ref 40–129)
ALT SERPL-CCNC: 17 U/L (ref 5–41)
AMPHET UR QL SCN: NEGATIVE
ANION GAP SERPL CALCULATED.3IONS-SCNC: 12 MMOL/L (ref 9–17)
AST SERPL-CCNC: 52 U/L
BARBITURATES UR QL SCN: NEGATIVE
BASOPHILS # BLD: 0 K/UL (ref 0–0.2)
BASOPHILS NFR BLD: 0 % (ref 0–2)
BENZODIAZ UR QL: NEGATIVE
BILIRUB SERPL-MCNC: 0.3 MG/DL (ref 0.3–1.2)
BUN SERPL-MCNC: 10 MG/DL (ref 6–20)
CALCIUM SERPL-MCNC: 8.9 MG/DL (ref 8.6–10.4)
CANNABINOIDS UR QL SCN: NEGATIVE
CHLORIDE SERPL-SCNC: 99 MMOL/L (ref 98–107)
CO2 SERPL-SCNC: 26 MMOL/L (ref 20–31)
COCAINE UR QL SCN: POSITIVE
CREAT SERPL-MCNC: 0.9 MG/DL (ref 0.7–1.2)
EOSINOPHIL # BLD: 0.1 K/UL (ref 0–0.4)
EOSINOPHILS RELATIVE PERCENT: 1 % (ref 0–4)
ERYTHROCYTE [DISTWIDTH] IN BLOOD BY AUTOMATED COUNT: 13.8 % (ref 11.5–14.9)
ETHANOL PERCENT: <0.01 %
ETHANOLAMINE SERPL-MCNC: <10 MG/DL
FENTANYL UR QL: POSITIVE
GFR SERPL CREATININE-BSD FRML MDRD: >60 ML/MIN/1.73M2
GLUCOSE SERPL-MCNC: 113 MG/DL (ref 70–99)
HCT VFR BLD AUTO: 38.1 % (ref 41–53)
HGB BLD-MCNC: 12.2 G/DL (ref 13.5–17.5)
LYMPHOCYTES NFR BLD: 0.7 K/UL (ref 1–4.8)
LYMPHOCYTES RELATIVE PERCENT: 10 % (ref 24–44)
MCH RBC QN AUTO: 30.7 PG (ref 26–34)
MCHC RBC AUTO-ENTMCNC: 32.1 G/DL (ref 31–37)
MCV RBC AUTO: 95.7 FL (ref 80–100)
METHADONE UR QL: NEGATIVE
MONOCYTES NFR BLD: 0.4 K/UL (ref 0.1–1.3)
MONOCYTES NFR BLD: 7 % (ref 1–7)
NEUTROPHILS NFR BLD: 82 % (ref 36–66)
NEUTS SEG NFR BLD: 5.6 K/UL (ref 1.3–9.1)
OPIATES UR QL SCN: NEGATIVE
OXYCODONE UR QL SCN: NEGATIVE
PCP UR QL SCN: NEGATIVE
PLATELET # BLD AUTO: 241 K/UL (ref 150–450)
PMV BLD AUTO: 7.4 FL (ref 6–12)
POTASSIUM SERPL-SCNC: 3.4 MMOL/L (ref 3.7–5.3)
PROT SERPL-MCNC: 6.7 G/DL (ref 6.4–8.3)
RBC # BLD AUTO: 3.98 M/UL (ref 4.5–5.9)
SODIUM SERPL-SCNC: 137 MMOL/L (ref 135–144)
TEST INFORMATION: ABNORMAL
WBC OTHER # BLD: 6.8 K/UL (ref 3.5–11)

## 2024-01-30 PROCEDURE — 85025 COMPLETE CBC W/AUTO DIFF WBC: CPT

## 2024-01-30 PROCEDURE — 6370000000 HC RX 637 (ALT 250 FOR IP): Performed by: EMERGENCY MEDICINE

## 2024-01-30 PROCEDURE — 80053 COMPREHEN METABOLIC PANEL: CPT

## 2024-01-30 PROCEDURE — 80307 DRUG TEST PRSMV CHEM ANLYZR: CPT

## 2024-01-30 PROCEDURE — 99283 EMERGENCY DEPT VISIT LOW MDM: CPT

## 2024-01-30 PROCEDURE — 36415 COLL VENOUS BLD VENIPUNCTURE: CPT

## 2024-01-30 PROCEDURE — G0480 DRUG TEST DEF 1-7 CLASSES: HCPCS

## 2024-01-30 PROCEDURE — 99285 EMERGENCY DEPT VISIT HI MDM: CPT

## 2024-01-30 RX ORDER — IBUPROFEN 800 MG/1
800 TABLET ORAL ONCE
Status: COMPLETED | OUTPATIENT
Start: 2024-01-30 | End: 2024-01-30

## 2024-01-30 RX ORDER — ALPRAZOLAM 0.5 MG/1
0.5 TABLET ORAL NIGHTLY PRN
Qty: 10 TABLET | Refills: 0 | Status: SHIPPED | OUTPATIENT
Start: 2024-01-30 | End: 2024-02-27

## 2024-01-30 RX ORDER — ONDANSETRON 4 MG/1
4 TABLET, ORALLY DISINTEGRATING ORAL 3 TIMES DAILY PRN
Qty: 21 TABLET | Refills: 0 | Status: SHIPPED | OUTPATIENT
Start: 2024-01-30

## 2024-01-30 RX ORDER — ACETAMINOPHEN 500 MG
1000 TABLET ORAL ONCE
Status: COMPLETED | OUTPATIENT
Start: 2024-01-30 | End: 2024-01-30

## 2024-01-30 RX ORDER — ALPRAZOLAM 0.5 MG/1
0.5 TABLET ORAL NIGHTLY PRN
Status: DISCONTINUED | OUTPATIENT
Start: 2024-01-30 | End: 2024-01-31 | Stop reason: HOSPADM

## 2024-01-30 RX ORDER — CYCLOBENZAPRINE HCL 10 MG
10 TABLET ORAL 3 TIMES DAILY PRN
Qty: 15 TABLET | Refills: 0 | Status: SHIPPED | OUTPATIENT
Start: 2024-01-30

## 2024-01-30 RX ORDER — QUETIAPINE FUMARATE 100 MG/1
100 TABLET, FILM COATED ORAL NIGHTLY
Qty: 7 TABLET | Refills: 0 | Status: SHIPPED | OUTPATIENT
Start: 2024-01-30 | End: 2024-02-06

## 2024-01-30 RX ADMIN — ALPRAZOLAM 0.5 MG: 0.5 TABLET ORAL at 23:39

## 2024-01-30 RX ADMIN — IBUPROFEN 800 MG: 800 TABLET ORAL at 23:39

## 2024-01-30 RX ADMIN — ACETAMINOPHEN 1000 MG: 500 TABLET ORAL at 23:38

## 2024-01-30 ASSESSMENT — PAIN - FUNCTIONAL ASSESSMENT
PAIN_FUNCTIONAL_ASSESSMENT: NONE - DENIES PAIN
PAIN_FUNCTIONAL_ASSESSMENT: NONE - DENIES PAIN

## 2024-01-30 ASSESSMENT — ENCOUNTER SYMPTOMS
SHORTNESS OF BREATH: 0
ABDOMINAL PAIN: 0

## 2024-01-30 ASSESSMENT — PAIN SCALES - GENERAL: PAINLEVEL_OUTOF10: 10

## 2024-01-30 NOTE — ED NOTES
Safeguard in [facility] for patient watch. Safeguard informed that they need to stay with the patient at all time, must be present in the room and if they need a break or relief to let the nurse know so they can be replaced. Safeguard verbalizes understanding. Belongings and patient checked by security. Belongings locked up. Pt in blue gown. Mode of arrival (squad #, walk in, police, etc) : [employer]        Chief complaint(s): Mental Health Evaluation        Arrival Note (brief scenario, treatment PTA, etc).: Pt was brought to the ED after [email or web address] responded to a call from the patient overdosed. Once on scene the patient was not overdosed and reported he took one pill of foreign substance and pt stated \"I don't know where I got it\". Pt states he was feeling suicidal \"Kind of\" when asked. Pt denies plan pt denies visual and audio hallucinations. When pt was asked about drugs or alcohol use pt states \"I don't remember\". When pt arrived with [employer] pt told officers he could not walk and was wheeled into the [facility] by wheel chair. When writer told pt to get into chair due to writers familiarity with pt, pt stood up and ambulated to chair without complication. Pt has also complied with [facility] change out and has been ambulating from restroom to bed without complication.        C= \"Have you ever felt that you should Cut down on your drinking?\"  Refused  A= \"Have people Annoyed you by criticizing your drinking?\"  Refused  G= \"Have you ever felt bad or Guilty about your drinking?\"  Refused  E= \"Have you ever had a drink as an Eye-opener first thing in the morning to steady your nerves or to help a hangover?\"  Refused      Deferred []      Reason for deferring: N/A    *If yes to two or more: probable alcohol abuse.*

## 2024-01-30 NOTE — ED NOTES
Jose Kaplan is a 33 year old male who presents to the ED via [employer]. [employer] responded to overdose call. When police arrived on scene, pt was not overdosing. [employer] evlauted pt while one scene and confirmed pt was not overdosing. [employer] transported pt to the ED on an voluntary status for care of SI.     Pt stating pt made a suicide attempt by taking one pill to intentionally overdose. When asked what did pt take, pt responds \"I don't know.\" When asked why pt is suicidal, pt responds \"I don't know.\" Pt responds \"I don't know\" to almost all assessment questions. Pt reports pt has been up \"for days.\" Pt is just wanting to sleep. Pt is a poor historian.     Pt has had multiple recent ED encounters. Pt is homeless. Pt is non compliant with treatment. Pt typically frequents the ED for secondary gain.

## 2024-01-30 NOTE — ED PROVIDER NOTES
1/22/2024 2/2/23   Karel Alexander, APRN - CNP   traZODone (DESYREL) 50 MG tablet Take 1 tablet by mouth nightly as needed for Sleep    Provider, MD Valeria   meloxicam (MOBIC) 7.5 MG tablet Take 1 tablet by mouth 2 times daily as needed for Pain 8/19/21 10/30/21  Sigrid Espinal, APRN - CNP       REVIEW OF SYSTEMS    (2-9 systems for level 4, 10 or more for level 5)    Review of Systems   Constitutional:  Negative for chills and fever.   Respiratory:  Negative for shortness of breath.    Cardiovascular:  Negative for chest pain.   Gastrointestinal:  Negative for abdominal pain.   Neurological:  Negative for headaches.   Psychiatric/Behavioral:  Positive for sleep disturbance. The patient is nervous/anxious.    All other systems reviewed and are negative.      PHYSICAL EXAM   (up to 7 for level 4, 8 or more for level 5)    INITIAL VITALS:   ED Triage Vitals [01/30/24 0446]   BP Temp Temp Source Pulse Respirations SpO2 Height Weight - Scale   (!) 127/52 98 °F (36.7 °C) Oral (!) 111 16 100 % 1.702 m (5' 7\") 90.7 kg (200 lb)       Physical Exam  Vitals and nursing note reviewed.   Constitutional:       General: He is not in acute distress.     Appearance: Normal appearance.   Cardiovascular:      Rate and Rhythm: Normal rate and regular rhythm.      Pulses: Normal pulses.           Radial pulses are 2+ on the right side and 2+ on the left side.   Pulmonary:      Effort: Pulmonary effort is normal. No respiratory distress.      Breath sounds: No decreased breath sounds.   Skin:     General: Skin is warm and dry.      Capillary Refill: Capillary refill takes less than 2 seconds.   Neurological:      General: No focal deficit present.      Mental Status: He is alert and oriented to person, place, and time.   Psychiatric:         Attention and Perception: He does not perceive auditory or visual hallucinations.         Mood and Affect: Affect is flat.         Speech: Speech is delayed.         Thought Content:

## 2024-01-30 NOTE — ED NOTES
Writer spoke with patient with ED physician. Patient denies any suicidal ideation plan or intent, and denies that he tried to kill himself prior to rrival. Patient states he was feeling confused and needed to sleep when he arrived in the ED. Patient states he has been living with his grandmother and wants to go back or to the [employer]. Patient states he feels safe being dscharged. Patient states \"Thanks for letting me rest here guys.\"   Patient displays no abnormal movements, speech rate, or tone. Articulations were within normal limits. Patients memory was intact. Thought form was linear. Patient denies obsessions or compulsions.  Patient denies homicidal ideation. Patient denies auditory and visual hallucinations.    Patient to be discharged. Writer reviewed safety plan with patient.     provided patient with brief patient prevention education interventions including follow-up outpatient treatment resources & recommendations, and lethal means counseling. Writer and patient discussed safety planning consisting of resources patient can use during or before a mental health crisis.     The following service(s) is/are recommended for behavioral health follow-up:  Contact [facility] Crisis Care line [phone] any time for support.  Medications: Take Medications as prescribed. Let your physician know if you have any concerns.  Recovery Help line for assistance with linkage to mental health and substance use services. Call 211.  Return to Emergency Department if you have any further medical concerns.   Patient given National suicide prevention line at 1-190.287.3577.

## 2024-01-31 ENCOUNTER — HOSPITAL ENCOUNTER (EMERGENCY)
Age: 34
Discharge: HOME | End: 2024-02-01
Attending: EMERGENCY MEDICINE
Payer: COMMERCIAL

## 2024-01-31 VITALS
SYSTOLIC BLOOD PRESSURE: 126 MMHG | TEMPERATURE: 98.6 F | RESPIRATION RATE: 18 BRPM | HEART RATE: 88 BPM | BODY MASS INDEX: 31.39 KG/M2 | HEIGHT: 67 IN | WEIGHT: 200 LBS | DIASTOLIC BLOOD PRESSURE: 82 MMHG | OXYGEN SATURATION: 100 %

## 2024-01-31 DIAGNOSIS — M79.641 PAIN IN BOTH HANDS: Primary | ICD-10-CM

## 2024-01-31 DIAGNOSIS — M79.642 PAIN IN BOTH HANDS: Primary | ICD-10-CM

## 2024-01-31 PROCEDURE — 99282 EMERGENCY DEPT VISIT SF MDM: CPT

## 2024-01-31 ASSESSMENT — PAIN SCALES - GENERAL: PAINLEVEL_OUTOF10: 8

## 2024-01-31 ASSESSMENT — PAIN DESCRIPTION - ORIENTATION: ORIENTATION: RIGHT;LEFT

## 2024-01-31 ASSESSMENT — PAIN - FUNCTIONAL ASSESSMENT: PAIN_FUNCTIONAL_ASSESSMENT: 0-10

## 2024-01-31 ASSESSMENT — PAIN DESCRIPTION - LOCATION: LOCATION: HAND

## 2024-01-31 NOTE — ED PROVIDER NOTES
EMERGENCY DEPARTMENT ENCOUNTER    Pt Name: Jose Kaplan  MRN: [id number]  Birthdate 1990  Date of evaluation: 1/30/24  CHIEF COMPLAINT       Chief Complaint   Patient presents with    Foot Pain     Bilateral    Hand Pain     HISTORY OF PRESENT ILLNESS   HPI   Patient is a 33-year-old male who presents to the ED for medication refills.  He request refills for his Zofran, Xanax, Flexeril and Seroquel.  Also reports hand and foot pain which is chronic.    REVIEW OF SYSTEMS     Review of Systems  All other systems reviewed and are negative.    PASTMEDICAL HISTORY     Past Medical History:   Diagnosis Date    Anxiety     Arthritis     Asthma     Bipolar I disorder, most recent episode (or current) depressed, unspecified 9/12/2014    Clostridium difficile infection     COPD (chronic obstructive pulmonary disease) ([facility])     Depression     Disease of blood and blood forming organ     Eczema     Fracture, metacarpal     R 4th and 5th    Gastric ulcer     Gastritis 06/13/2018    GERD (gastroesophageal reflux disease)     GI bleed     H. 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[facility])     Substance abuse ([facility])     Hx of opiates, benzos, cocaine, alcohol abuse    Suicidal ideation     Suicide attempt by hanging ([facility])     Tobacco dependence     Ulcerative colitis ([facility])     UTI (urinary tract infection)      Past Problem List  Patient Active Problem List   Diagnosis Code    Opioid abuse ([facility]) F11.10    Noncompliance Z91.199    Rectal bleeding K62.5    Smoker F17.200    Juvenile rheumatoid arthritis ([facility]) M08.00    SRIRAM (obstructive sleep apnea) G47.33    Primary insomnia F51.01

## 2024-02-01 NOTE — DISCHARGE INSTRUCTIONS
Take meds as prescribed.  Follow outpatient tomorrow with doctor or by calling [phone] or [phone].   Return to ER immediately if symptoms worsen or persist.

## 2024-02-01 NOTE — ED PROVIDER NOTES
98.6 °F (37 °C)   TempSrc: Oral   SpO2: 100%   Weight: 90.7 kg (200 lb)   Height: 1.702 m (5' 7.01\")     HEARTSCORE:0    Patient requested to stool.  This alleviated.  No signs of infection to the hands.  Dry skin appears to be improved.  Patient be discharged home outpatient follow-up.    Evaluation and treatment course in the ED, and plan of care upon discharge was discussed in length with the patient. Patient had no further questions prior to being discharged and was instructed to return to the ED for new or worsening symptoms.      The patient was involved in his/her plan of care. The testing that was ordered was discussed with the patient. Any medications that may have been ordered were discussed with the patient.       I have reviewed the patient's previous medical records using the electronic health record that we have available.      Labs and imaging were reviewed.     CONSULTS:  None    PROCEDURES:  Procedures        FINAL IMPRESSION      1. Pain in both hands          DISPOSITION/PLAN   DISPOSITION Decision To Discharge 02/01/2024 12:07:14 AM      PATIENTREFERRED TO:   No follow-up provider specified.    DISCHARGE MEDICATIONS:     New Prescriptions    No medications on file           (Please note that portions of this note were completed with a voice recognition program.  Efforts were made to edit thedictations but occasionally words are mis-transcribed.)    RACQUEL Richardson, Topher Grande PA-C  02/01/24 0007

## 2024-02-02 ENCOUNTER — HOSPITAL ENCOUNTER (EMERGENCY)
Age: 34
Discharge: HOME | End: 2024-02-02
Attending: STUDENT IN AN ORGANIZED HEALTH CARE EDUCATION/TRAINING PROGRAM
Payer: COMMERCIAL

## 2024-02-02 VITALS
RESPIRATION RATE: 19 BRPM | OXYGEN SATURATION: 97 % | DIASTOLIC BLOOD PRESSURE: 98 MMHG | HEART RATE: 122 BPM | TEMPERATURE: 98 F | SYSTOLIC BLOOD PRESSURE: 180 MMHG

## 2024-02-02 DIAGNOSIS — M79.642 PAIN IN BOTH HANDS: Primary | ICD-10-CM

## 2024-02-02 DIAGNOSIS — M79.641 PAIN IN BOTH HANDS: Primary | ICD-10-CM

## 2024-02-02 DIAGNOSIS — Z76.5 MALINGERING: ICD-10-CM

## 2024-02-02 PROCEDURE — 6370000000 HC RX 637 (ALT 250 FOR IP): Performed by: STUDENT IN AN ORGANIZED HEALTH CARE EDUCATION/TRAINING PROGRAM

## 2024-02-02 PROCEDURE — 99283 EMERGENCY DEPT VISIT LOW MDM: CPT

## 2024-02-02 RX ORDER — IBUPROFEN 400 MG/1
400 TABLET ORAL ONCE
Status: COMPLETED | OUTPATIENT
Start: 2024-02-02 | End: 2024-02-02

## 2024-02-02 RX ADMIN — IBUPROFEN 400 MG: 400 TABLET, FILM COATED ORAL at 22:48

## 2024-02-03 NOTE — ED NOTES
Pt refusing accurate BP recheck. Not allowing me to use appropriate sized cuff and refusing to remove coat sleeve

## 2024-02-03 NOTE — ED PROVIDER NOTES
[facility] EMERGENCY DEPARTMENT ENCOUNTER      Pt Name: Jose Kaplan  MRN: [id number]  Birthdate 1990  Date of evaluation: 2/3/24    CHIEF COMPLAINT       Chief Complaint   Patient presents with    Hand Pain     Left hand pain.        HISTORY OF PRESENT ILLNESS   Jose Kaplan is a 33 y.o. male who presents with left hand pain.  Well-known to the emergency department, denies any injury.      PASTMEDICAL HISTORY     Past Medical History:   Diagnosis Date    Anxiety     Arthritis     Asthma     Bipolar I disorder, most recent episode (or current) depressed, unspecified 9/12/2014    Clostridium difficile infection     COPD (chronic obstructive pulmonary disease) ([facility])     Depression     Disease of blood and blood forming organ     Eczema     Fracture, metacarpal     R 4th and 5th    Gastric ulcer     Gastritis 06/13/2018    GERD (gastroesophageal reflux disease)     GI bleed     H. 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[facility])     Substance abuse ([facility])     Hx of opiates, benzos, cocaine, alcohol abuse    Suicidal ideation     Suicide attempt by hanging ([facility])     Tobacco dependence     Ulcerative colitis ([facility])     UTI (urinary tract infection)      Past Problem List  Patient Active Problem List   Diagnosis Code    Opioid abuse ([facility]) F11.10    Noncompliance Z91.199    Rectal bleeding K62.5    Smoker F17.200    Juvenile rheumatoid arthritis ([facility]) M08.00    SRIRAM (obstructive sleep apnea) G47.33    Primary insomnia F51.01    Calculus of bile duct with acute on chronic cholecystitis K80.46    Mild intermittent asthma without complication J45.20    Gastritis K29.70     (urinary tract infection)        2)  Data Reviewed    External Documents Reviewed: Previous ER visits reviewed by myself      3)  Treatment and Disposition  [Patient repeat assessment, Disposition discussion with patient/family, Case discussed with consulting clinician, MIPS, Social determinants of health impacting treatment or disposition, Shared Decision Making, Code Status Discussion:]    Appearing in typical state of health.  Given dose of ibuprofen.  Based on the low acuity of concerning symptoms and improvement of symptoms, patient will be discharged with follow up and prescription information listed in the Disposition section.  Patient states they will follow-up with primary care physician and/or return to the emergency department should they experience a change or worsening of symptoms.  Patient will be discharged with resources: summary of visit, instructions, follow-up information, prescriptions if necessary.  Patient/ family instructed to read discharge paperwork. All of their questions and concerns were addressed.   Suspicion for any acute life-threatening processes is low.   Patient voices understanding of plan.      DATA FOR LAB AND RADIOLOGY TESTS ORDERED BELOW ARE REVIEWED BY THE ED CLINICIAN:    RADIOLOGY: All x-rays, CT, MRI, and formal ultrasound images (except ED bedside ultrasound) are read by the radiologist, see reports below, unless otherwise noted in MDM or here.  Reports below are reviewed by myself.  No orders to display       LABS: Lab orders shown below, the results are reviewed by myself, and all abnormals are listed below.  Labs Reviewed - No data to display    Vitals Reviewed:    Vitals:    02/02/24 2055 02/02/24 2057 02/02/24 2147   BP:  (!) 180/98    Pulse: (!) 130  (!) 122   Resp: 19     Temp: 98 °F (36.7 °C)     TempSrc: Oral     SpO2: 97%       MEDICATIONS GIVEN TO PATIENT THIS ENCOUNTER:  Orders Placed This Encounter   Medications    ibuprofen (ADVIL;MOTRIN) tablet 400 mg

## 2024-02-05 ENCOUNTER — HOSPITAL ENCOUNTER (EMERGENCY)
Age: 34
Discharge: LEFT BEFORE DISCHARGE | End: 2024-02-05

## 2024-02-05 VITALS
HEART RATE: 121 BPM | RESPIRATION RATE: 20 BRPM | DIASTOLIC BLOOD PRESSURE: 110 MMHG | HEIGHT: 67 IN | OXYGEN SATURATION: 96 % | WEIGHT: 200 LBS | TEMPERATURE: 97.9 F | SYSTOLIC BLOOD PRESSURE: 152 MMHG | BODY MASS INDEX: 31.39 KG/M2

## 2024-02-05 ASSESSMENT — PAIN - FUNCTIONAL ASSESSMENT: PAIN_FUNCTIONAL_ASSESSMENT: 0-10

## 2024-02-05 ASSESSMENT — PAIN SCALES - GENERAL: PAINLEVEL_OUTOF10: 5

## 2024-02-06 ENCOUNTER — HOSPITAL ENCOUNTER (EMERGENCY)
Age: 34
Discharge: HOME | End: 2024-02-06
Attending: EMERGENCY MEDICINE
Payer: COMMERCIAL

## 2024-02-06 ENCOUNTER — HOSPITAL ENCOUNTER (EMERGENCY)
Age: 34
Discharge: HOME | End: 2024-02-07
Attending: EMERGENCY MEDICINE
Payer: COMMERCIAL

## 2024-02-06 VITALS
HEART RATE: 100 BPM | TEMPERATURE: 97.6 F | DIASTOLIC BLOOD PRESSURE: 58 MMHG | OXYGEN SATURATION: 97 % | SYSTOLIC BLOOD PRESSURE: 146 MMHG | RESPIRATION RATE: 18 BRPM

## 2024-02-06 VITALS
TEMPERATURE: 98.3 F | SYSTOLIC BLOOD PRESSURE: 142 MMHG | WEIGHT: 200 LBS | HEIGHT: 67 IN | HEART RATE: 98 BPM | BODY MASS INDEX: 31.39 KG/M2 | OXYGEN SATURATION: 100 % | RESPIRATION RATE: 15 BRPM | DIASTOLIC BLOOD PRESSURE: 82 MMHG

## 2024-02-06 DIAGNOSIS — F23 ACUTE PSYCHOSIS (HCC): ICD-10-CM

## 2024-02-06 DIAGNOSIS — Z76.0 ENCOUNTER FOR MEDICATION REFILL: Primary | ICD-10-CM

## 2024-02-06 PROCEDURE — 99283 EMERGENCY DEPT VISIT LOW MDM: CPT

## 2024-02-06 PROCEDURE — 6370000000 HC RX 637 (ALT 250 FOR IP): Performed by: EMERGENCY MEDICINE

## 2024-02-06 RX ORDER — QUETIAPINE FUMARATE 100 MG/1
100 TABLET, FILM COATED ORAL NIGHTLY
Qty: 7 TABLET | Refills: 0 | Status: SHIPPED | OUTPATIENT
Start: 2024-02-06 | End: 2024-02-08

## 2024-02-06 RX ORDER — ALPRAZOLAM 0.5 MG/1
0.5 TABLET ORAL NIGHTLY PRN
Qty: 10 TABLET | Refills: 0 | Status: SHIPPED | OUTPATIENT
Start: 2024-02-06 | End: 2024-02-08

## 2024-02-06 RX ORDER — ACETAMINOPHEN 500 MG
1000 TABLET ORAL ONCE
Status: COMPLETED | OUTPATIENT
Start: 2024-02-06 | End: 2024-02-06

## 2024-02-06 RX ORDER — IBUPROFEN 600 MG/1
600 TABLET ORAL 3 TIMES DAILY PRN
Qty: 30 TABLET | Refills: 0 | Status: SHIPPED | OUTPATIENT
Start: 2024-02-06 | End: 2024-02-08

## 2024-02-06 RX ORDER — CYCLOBENZAPRINE HCL 10 MG
10 TABLET ORAL 3 TIMES DAILY PRN
Qty: 15 TABLET | Refills: 0 | Status: SHIPPED | OUTPATIENT
Start: 2024-02-06 | End: 2024-02-06

## 2024-02-06 RX ORDER — ACETAMINOPHEN 500 MG
1000 TABLET ORAL ONCE
Status: DISCONTINUED | OUTPATIENT
Start: 2024-02-06 | End: 2024-02-06 | Stop reason: HOSPADM

## 2024-02-06 RX ORDER — ALPRAZOLAM 0.5 MG/1
0.5 TABLET ORAL NIGHTLY PRN
Status: DISCONTINUED | OUTPATIENT
Start: 2024-02-06 | End: 2024-02-07 | Stop reason: HOSPADM

## 2024-02-06 RX ADMIN — ALPRAZOLAM 0.5 MG: 0.5 TABLET ORAL at 23:00

## 2024-02-06 RX ADMIN — ACETAMINOPHEN 1000 MG: 500 TABLET ORAL at 23:00

## 2024-02-06 ASSESSMENT — PAIN - FUNCTIONAL ASSESSMENT
PAIN_FUNCTIONAL_ASSESSMENT: 0-10
PAIN_FUNCTIONAL_ASSESSMENT: 0-10

## 2024-02-06 ASSESSMENT — PAIN SCALES - GENERAL
PAINLEVEL_OUTOF10: 4
PAINLEVEL_OUTOF10: 5

## 2024-02-06 NOTE — ED NOTES
Patient refused the tylenol that he previously asked for, states that \"he thinks he should be okay.\" Informed that prescriptions sent to the [facility] pharmacy. Verbalizes understanding of discharge instructions.

## 2024-02-06 NOTE — ED NOTES
Pt brought bought to room  Pt states that he wants a room with a bed. Writer explains that this is an appropriate room

## 2024-02-06 NOTE — ED PROVIDER NOTES
N/A    Decision Rules/Scores utilized:  N/A    HEART SCORE: N/A, no chest pain.    NIH STROKE SCALE: N/A, no focal neurodeficits.     External Documents Reviewed: I have independently reviewed patient's previous medical records including labs, notes and imaging.    Tests considered but not ordered and why:  N/A    Imaging that is independently reviewed and interpreted by me as: N/A    See more data below for the lab and radiology tests and orders.    3)  Treatment and Disposition    Patient repeat assessment: Stable.  Provide refills for Xanax, Flexeril, Motrin and Seroquel.    Case discussed with consulting clinician:  N/A    Disposition discussion with patient/family: Patient aware and agrees with disposition plan.    MIPS:  N/A    Social determinants of health impacting treatment or disposition:  None    Shared Decision Making completed with patient regarding risks and benefits of admission versus discharge.  Patient decides to be discharged home.    Code Status Discussion:  Not discussed    \"ED Course\" Notes From Epic Narrator:     All questions answered.  Given strict return precautions.  No further work-up indicated at this time.      CRITICAL CARE:   N/A    PROCEDURES:  Procedures      DATA FOR LAB AND RADIOLOGY TESTS ORDERED BELOW ARE REVIEWED BY THE ED CLINICIAN:    RADIOLOGY: All x-rays, CT, MRI, and formal ultrasound images (except ED bedside ultrasound) are read by the radiologist, see reports below, unless otherwise noted in MDM or here.  Reports below are reviewed by myself.  No orders to display       LABS: Lab orders shown below, the results are reviewed by myself, and all abnormals are listed below.  Labs Reviewed - No data to display    Vitals Reviewed:    Vitals:    02/06/24 0451   BP: (!) 142/82   Pulse: 98   Resp: 15   Temp: 98.3 °F (36.8 °C)   TempSrc: Oral   SpO2: 100%   Weight: 90.7 kg (200 lb)   Height: 1.702 m (5' 7\")     MEDICATIONS GIVEN TO PATIENT THIS ENCOUNTER:  Orders Placed This

## 2024-02-06 NOTE — ED NOTES
Patient presents to ED for refills of his medications. Patient denies any other needs at this time. Food provided per pt request.

## 2024-02-07 NOTE — ED NOTES
Pt presents to ED from home c/o bilateral hand pain that started earlier today, also requesting refill of prescription for anxiety meds, pt reports he takes alprazolam and ran out. Reports he went to [facility] earlier today and they told him \"it will be a few days.\"   Pt given warm blanket for comfort, respirations nonlabored, chest expansion equal, pt A&Ox4.

## 2024-02-08 ENCOUNTER — HOSPITAL ENCOUNTER (EMERGENCY)
Age: 34
Discharge: HOME | End: 2024-02-08
Attending: STUDENT IN AN ORGANIZED HEALTH CARE EDUCATION/TRAINING PROGRAM
Payer: COMMERCIAL

## 2024-02-08 VITALS
HEART RATE: 114 BPM | OXYGEN SATURATION: 94 % | DIASTOLIC BLOOD PRESSURE: 87 MMHG | RESPIRATION RATE: 14 BRPM | SYSTOLIC BLOOD PRESSURE: 130 MMHG

## 2024-02-08 DIAGNOSIS — M79.641 PAIN IN BOTH HANDS: Primary | ICD-10-CM

## 2024-02-08 DIAGNOSIS — M79.642 PAIN IN BOTH HANDS: Primary | ICD-10-CM

## 2024-02-08 DIAGNOSIS — F23 ACUTE PSYCHOSIS (HCC): ICD-10-CM

## 2024-02-08 DIAGNOSIS — Z76.0 ENCOUNTER FOR MEDICATION REFILL: ICD-10-CM

## 2024-02-08 PROCEDURE — 99283 EMERGENCY DEPT VISIT LOW MDM: CPT

## 2024-02-08 PROCEDURE — 6370000000 HC RX 637 (ALT 250 FOR IP): Performed by: NURSE PRACTITIONER

## 2024-02-08 RX ORDER — IBUPROFEN 600 MG/1
600 TABLET ORAL EVERY 8 HOURS PRN
Qty: 15 TABLET | Refills: 0 | Status: SHIPPED | OUTPATIENT
Start: 2024-02-08

## 2024-02-08 RX ORDER — ACETAMINOPHEN 325 MG/1
650 TABLET ORAL ONCE
Status: COMPLETED | OUTPATIENT
Start: 2024-02-08 | End: 2024-02-08

## 2024-02-08 RX ORDER — ALPRAZOLAM 0.5 MG/1
0.5 TABLET ORAL NIGHTLY PRN
Qty: 7 TABLET | Refills: 0 | Status: SHIPPED | OUTPATIENT
Start: 2024-02-08 | End: 2024-03-07

## 2024-02-08 RX ORDER — IBUPROFEN 600 MG/1
600 TABLET ORAL ONCE
Status: COMPLETED | OUTPATIENT
Start: 2024-02-08 | End: 2024-02-08

## 2024-02-08 RX ORDER — CYCLOBENZAPRINE HCL 10 MG
10 TABLET ORAL 3 TIMES DAILY PRN
Qty: 10 TABLET | Refills: 0 | Status: SHIPPED | OUTPATIENT
Start: 2024-02-08

## 2024-02-08 RX ORDER — QUETIAPINE FUMARATE 100 MG/1
100 TABLET, FILM COATED ORAL NIGHTLY
Qty: 7 TABLET | Refills: 0 | Status: SHIPPED | OUTPATIENT
Start: 2024-02-08 | End: 2024-02-15

## 2024-02-08 RX ADMIN — ACETAMINOPHEN 650 MG: 325 TABLET ORAL at 21:41

## 2024-02-08 RX ADMIN — IBUPROFEN 600 MG: 600 TABLET ORAL at 21:42

## 2024-02-08 ASSESSMENT — PAIN - FUNCTIONAL ASSESSMENT: PAIN_FUNCTIONAL_ASSESSMENT: 0-10

## 2024-02-08 ASSESSMENT — PAIN DESCRIPTION - ORIENTATION: ORIENTATION: RIGHT;LEFT

## 2024-02-08 ASSESSMENT — PAIN SCALES - GENERAL
PAINLEVEL_OUTOF10: 5
PAINLEVEL_OUTOF10: 5

## 2024-02-08 ASSESSMENT — PAIN DESCRIPTION - LOCATION: LOCATION: HAND;FOOT

## 2024-02-09 ENCOUNTER — HOSPITAL ENCOUNTER (EMERGENCY)
Age: 34
Discharge: HOME | End: 2024-02-09

## 2024-02-09 ENCOUNTER — HOSPITAL ENCOUNTER (EMERGENCY)
Age: 34
Discharge: HOME | End: 2024-02-09
Attending: EMERGENCY MEDICINE
Payer: COMMERCIAL

## 2024-02-09 VITALS
DIASTOLIC BLOOD PRESSURE: 78 MMHG | OXYGEN SATURATION: 98 % | SYSTOLIC BLOOD PRESSURE: 112 MMHG | RESPIRATION RATE: 17 BRPM | HEART RATE: 53 BPM

## 2024-02-09 VITALS
RESPIRATION RATE: 16 BRPM | HEART RATE: 72 BPM | DIASTOLIC BLOOD PRESSURE: 79 MMHG | SYSTOLIC BLOOD PRESSURE: 128 MMHG | OXYGEN SATURATION: 97 %

## 2024-02-09 DIAGNOSIS — M79.671 RIGHT FOOT PAIN: Primary | ICD-10-CM

## 2024-02-09 PROCEDURE — 6370000000 HC RX 637 (ALT 250 FOR IP)

## 2024-02-09 PROCEDURE — 99283 EMERGENCY DEPT VISIT LOW MDM: CPT

## 2024-02-09 RX ORDER — ACETAMINOPHEN 500 MG
1000 TABLET ORAL ONCE
Status: COMPLETED | OUTPATIENT
Start: 2024-02-09 | End: 2024-02-09

## 2024-02-09 RX ORDER — IBUPROFEN 800 MG/1
800 TABLET ORAL ONCE
Status: COMPLETED | OUTPATIENT
Start: 2024-02-09 | End: 2024-02-09

## 2024-02-09 RX ADMIN — IBUPROFEN 800 MG: 800 TABLET, FILM COATED ORAL at 20:33

## 2024-02-09 RX ADMIN — ACETAMINOPHEN 1000 MG: 500 TABLET ORAL at 20:33

## 2024-02-09 ASSESSMENT — ENCOUNTER SYMPTOMS
SHORTNESS OF BREATH: 0
ABDOMINAL PAIN: 0
COUGH: 0
BACK PAIN: 0
VOMITING: 0
COLOR CHANGE: 0
NAUSEA: 0
DIARRHEA: 0

## 2024-02-09 NOTE — ED PROVIDER NOTES
[facility]  eMERGENCY dEPARTMENT eNCOUnter      Pt Name: Jose Kaplan  MRN: [id number]  Birthdate 1990  Date of evaluation: 2/8/2024  Provider: MASSIEL Salas CNP    CHIEF COMPLAINT       Chief Complaint   Patient presents with    Hand Pain    Foot Pain         HISTORY OF PRESENT ILLNESS  (Location/Symptom, Timing/Onset, Context/Setting, Quality, Duration, Modifying Factors, Severity.)   Jose Kaplan is a 33 y.o. male who presents to the emergency department. C/o bilateral hand pain which is chronic. He is requesting refills of his motrin, flexeril, xanax, and seroquel. He states a  placed his medication onto the ground and he was no longer comfortable taking the medication. Denies fever, chills, weakness, injury. Denies CP, SOB, dizziness. The patient has had numerous ED visits for medication refills and his chronic hand/foot pain. Rates his pain 5/10.      Nursing Notes were reviewed.    ALLERGIES     Dicyclomine, Famotidine, Geodon [ziprasidone hcl], Haloperidol, Iv dye [iodides], Reglan [metoclopramide], Ibuprofen, Aspirin, Dicyclomine hcl, Hydroxyzine hcl, Iodine, Metronidazole, Mirtazapine, Ondansetron hcl, Promethazine, and Ziprasidone    CURRENT MEDICATIONS       Discharge Medication List as of 2/8/2024  9:47 PM        CONTINUE these medications which have NOT CHANGED    Details   ondansetron (ZOFRAN-ODT) 4 MG disintegrating tablet Take 1 tablet by mouth 3 times daily as needed for Nausea or Vomiting, Disp-21 tablet, R-0Normal      acetaminophen (TYLENOL) 500 MG tablet Take 1 tablet by mouth 4 times daily as needed for Pain, Disp-360 tablet, R-1Normal      diclofenac sodium (VOLTAREN) 1 % GEL Apply 4 g topically 4 times daily for 5 days, Topical, 4 TIMES DAILY Starting Sat 1/27/2024, Until Thu 2/1/2024, For 5 days, Disp-50 g, R-0, Normal      ondansetron (ZOFRAN) 4 MG tablet Take 1 tablet by mouth every 8 hours as needed for Nausea or Vomiting, Disp-12

## 2024-02-10 NOTE — ED PROVIDER NOTES
eMERGENCY dEPARTMENT eNCOUnter   Independent Attestation     Pt Name: Jose Kaplan  MRN: [id number]  Birthdate 1990  Date of evaluation: 2/9/24     Jose Kaplan is a 33 y.o. male with CC: Hand Pain and Foot Pain        This visit was performed by both a physician and an APC. I performed all aspects of the MDM as documented.      Topher Berman DO  Attending Emergency Physician         Topher Berman DO  02/09/24 1902

## 2024-02-10 NOTE — ED TRIAGE NOTES
Pt arrived to ED with reports of foot pain, and hungry.   Pt states the pain has not changed since being seen art [facility] yesterday

## 2024-02-10 NOTE — DISCHARGE INSTRUCTIONS
Call today or tomorrow to follow up with your PCP in 2 days.  You have been given sachet's of bacitracin in the ED, you are advised to apply bacitracin twice daily on the wound.    Use an ice pack or bag filled with ice and apply to the injured area 3 - 4 times a day for 15 - 20 minutes each time.    Use ibuprofen or Tylenol (unless prescribed medications that have Tylenol in it) for pain.  You can take over the counter Ibuprofen (advil) tablets (4 every 8 hours or 3 every 6 hours or 2 every 4 hours)    Use your crutches for the next several days until you are able to take 10 steps without pain.    Return to the Emergency Department for worsening of pain, increase swelling to the ankle, inability to move your toes, any other care or concern.

## 2024-02-10 NOTE — ED PROVIDER NOTES
[facility]     Emergency Department     Faculty Attestation    I performed a history and physical examination of the patient and discussed management with the resident. I reviewed the resident’s note and agree with the documented findings and plan of care. Any areas of disagreement are noted on the chart. I was personally present for the key portions of any procedures. I have documented in the chart those procedures where I was not present during the key portions. I have reviewed the emergency nurses triage note. I agree with the chief complaint, past medical history, past surgical history, allergies, medications, social and family history as documented unless otherwise noted below. Documentation of the HPI, Physical Exam and Medical Decision Making performed by medical students or scribes is based on my personal performance of the HPI, PE and MDM. For Physician Assistant/ Nurse Practitioner cases/documentation I have personally evaluated this patient and have completed at least one if not all key elements of the E/M (history, physical exam, and MDM). Additional findings are as noted.    Vital signs:   Vitals:    02/09/24 2023   BP: 112/78   Pulse: 53   Resp: 17   SpO2: 98%      Friction ulcer on the dorsum of the right foot. Plan for local wound care.             Astrid Quintero M.D,  Attending Emergency  Physician           Astrid Qunitero MD  02/09/24 2030    
Haloperidol, Iv dye [iodides], Reglan [metoclopramide], Ibuprofen, Aspirin, Dicyclomine hcl, Hydroxyzine hcl, Iodine, Metronidazole, Mirtazapine, Ondansetron hcl, Promethazine, and Ziprasidone    Home Medications:  Prior to Admission medications    Medication Sig Start Date End Date Taking? Authorizing Provider   ALPRAZolam (XANAX) 0.5 MG tablet Take 1 tablet by mouth nightly as needed for Anxiety for up to 7 doses. Max Daily Amount: 0.5 mg 2/8/24 3/7/24  Chhaya Frey APRN - CNP   ibuprofen (ADVIL;MOTRIN) 600 MG tablet Take 1 tablet by mouth every 8 hours as needed for Pain or Fever 2/8/24   Chhaya Frey APRN - CNP   QUEtiapine (SEROQUEL) 100 MG tablet Take 1 tablet by mouth at bedtime for 7 days 2/8/24 2/15/24  Chhaya Frey APRN - CNP   cyclobenzaprine (FLEXERIL) 10 MG tablet Take 1 tablet by mouth 3 times daily as needed for Muscle spasms 2/8/24   Chhaya Frey APRN - CNP   ondansetron (ZOFRAN-ODT) 4 MG disintegrating tablet Take 1 tablet by mouth 3 times daily as needed for Nausea or Vomiting 1/30/24   Jono Lewis MD   acetaminophen (TYLENOL) 500 MG tablet Take 1 tablet by mouth 4 times daily as needed for Pain 1/29/24   Goldberger, Erica B, MD   diclofenac sodium (VOLTAREN) 1 % GEL Apply 4 g topically 4 times daily for 5 days 1/27/24 2/1/24  Goldberger, Erica B, MD   ondansetron (ZOFRAN) 4 MG tablet Take 1 tablet by mouth every 8 hours as needed for Nausea or Vomiting 1/16/24   Chhaya Frey APRN - CNP   metFORMIN (GLUCOPHAGE) 500 MG tablet Take 1 tablet by mouth 2 times daily (with meals) Indications: last dispensed 1/6/23 5/28/23 6/27/23  Sara Bryan DO   carBAMazepine (TEGRETOL) 200 MG tablet  3/13/23   Valeria Escobar MD   cloNIDine (CATAPRES) 0.1 MG tablet  3/13/23   Valeria Escobar MD   gabapentin (NEURONTIN) 300 MG capsule  3/13/23   Provider, Historical, MD   naloxone 4 MG/0.1ML LIQD nasal spray  3/13/23   Provider, Historical, MD   Benzocaine-Menthol (CEPACOL)

## 2024-02-15 ENCOUNTER — HOSPITAL ENCOUNTER (EMERGENCY)
Age: 34
Discharge: HOME | End: 2024-02-15
Attending: EMERGENCY MEDICINE
Payer: COMMERCIAL

## 2024-02-15 VITALS
RESPIRATION RATE: 18 BRPM | TEMPERATURE: 97.2 F | HEART RATE: 63 BPM | OXYGEN SATURATION: 98 % | DIASTOLIC BLOOD PRESSURE: 72 MMHG | SYSTOLIC BLOOD PRESSURE: 117 MMHG

## 2024-02-15 DIAGNOSIS — M79.671 RIGHT FOOT PAIN: Primary | ICD-10-CM

## 2024-02-15 PROCEDURE — 6370000000 HC RX 637 (ALT 250 FOR IP): Performed by: NURSE PRACTITIONER

## 2024-02-15 PROCEDURE — 99283 EMERGENCY DEPT VISIT LOW MDM: CPT

## 2024-02-15 RX ORDER — ACETAMINOPHEN 500 MG
1000 TABLET ORAL ONCE
Status: COMPLETED | OUTPATIENT
Start: 2024-02-15 | End: 2024-02-15

## 2024-02-15 RX ORDER — ALPRAZOLAM 0.5 MG/1
1 TABLET ORAL ONCE
Status: COMPLETED | OUTPATIENT
Start: 2024-02-15 | End: 2024-02-15

## 2024-02-15 RX ADMIN — ACETAMINOPHEN 1000 MG: 500 TABLET ORAL at 21:18

## 2024-02-15 RX ADMIN — ALPRAZOLAM 1 MG: 0.5 TABLET ORAL at 21:18

## 2024-02-16 ENCOUNTER — HOSPITAL ENCOUNTER (EMERGENCY)
Age: 34
Discharge: HOME | End: 2024-02-16
Attending: STUDENT IN AN ORGANIZED HEALTH CARE EDUCATION/TRAINING PROGRAM
Payer: MEDICARE

## 2024-02-16 VITALS
WEIGHT: 180 LBS | HEART RATE: 123 BPM | SYSTOLIC BLOOD PRESSURE: 139 MMHG | OXYGEN SATURATION: 99 % | BODY MASS INDEX: 28.19 KG/M2 | RESPIRATION RATE: 18 BRPM | DIASTOLIC BLOOD PRESSURE: 87 MMHG

## 2024-02-16 DIAGNOSIS — M79.671 RIGHT FOOT PAIN: Primary | ICD-10-CM

## 2024-02-16 PROCEDURE — 99282 EMERGENCY DEPT VISIT SF MDM: CPT

## 2024-02-16 NOTE — DISCHARGE INSTRUCTIONS
You may follow-up with primary care provider on list or with podiatry clinic provided for for evaluation of your foot pain.  Return to emergency department for worsening or new symptoms.

## 2024-02-16 NOTE — ED PROVIDER NOTES
[facility] ED  eMERGENCY dEPARTMENT eNCOUnter      Pt Name: Jose Kaplan  MRN: [id number]  Birthdate 1990  Date of evaluation: 2/15/2024  Provider: MASSIEL Arteaga CNP    CHIEF COMPLAINT       Chief Complaint   Patient presents with    Foot Pain     R foot pain           HISTORY OF PRESENT ILLNESS  (Location/Symptom, Timing/Onset, Context/Setting, Quality, Duration, Modifying Factors, Severity.)   Jose Kaplan is a 33 y.o. male who presents to the emergency department for evaluation of right foot pain that been ongoing for quite some time.  Denies injury or fall.  Patient states he has a callus on his right foot from where his shoe rubs.      Nursing Notes were reviewed.    ALLERGIES     Dicyclomine, Famotidine, Geodon [ziprasidone hcl], Haloperidol, Iv dye [iodides], Reglan [metoclopramide], Ibuprofen, Aspirin, Dicyclomine hcl, Hydroxyzine hcl, Iodine, Metronidazole, Mirtazapine, Ondansetron hcl, Promethazine, and Ziprasidone    CURRENT MEDICATIONS       Previous Medications    ACETAMINOPHEN (TYLENOL) 500 MG TABLET    Take 1 tablet by mouth 4 times daily as needed for Pain    ALPRAZOLAM (XANAX) 0.5 MG TABLET    Take 1 tablet by mouth nightly as needed for Anxiety for up to 7 doses. Max Daily Amount: 0.5 mg    BENZOCAINE-MENTHOL (CEPACOL) 6-10 MG LOZG LOZENGE    Take 1 lozenge by mouth every 2 hours as needed for Sore Throat    CARBAMAZEPINE (TEGRETOL) 200 MG TABLET        CLONIDINE (CATAPRES) 0.1 MG TABLET        CYCLOBENZAPRINE (FLEXERIL) 10 MG TABLET    Take 1 tablet by mouth 3 times daily as needed for Muscle spasms    DICLOFENAC SODIUM (VOLTAREN) 1 % GEL    Apply 4 g topically 4 times daily for 5 days    GABAPENTIN (NEURONTIN) 300 MG CAPSULE        IBUPROFEN (ADVIL;MOTRIN) 600 MG TABLET    Take 1 tablet by mouth every 8 hours as needed for Pain or Fever    METFORMIN (GLUCOPHAGE) 500 MG TABLET    Take 1 tablet by mouth 2 times daily (with meals) Indications: last dispensed 1/6/23

## 2024-02-17 ENCOUNTER — HOSPITAL ENCOUNTER (EMERGENCY)
Age: 34
Discharge: HOME | End: 2024-02-17
Attending: STUDENT IN AN ORGANIZED HEALTH CARE EDUCATION/TRAINING PROGRAM
Payer: MEDICARE

## 2024-02-17 VITALS
HEART RATE: 86 BPM | DIASTOLIC BLOOD PRESSURE: 88 MMHG | RESPIRATION RATE: 18 BRPM | TEMPERATURE: 97.7 F | OXYGEN SATURATION: 100 % | SYSTOLIC BLOOD PRESSURE: 145 MMHG

## 2024-02-17 DIAGNOSIS — Z59.00 HOMELESS: Primary | ICD-10-CM

## 2024-02-17 DIAGNOSIS — T69.9XXA COLD EXPOSURE, INITIAL ENCOUNTER: ICD-10-CM

## 2024-02-17 PROCEDURE — 99282 EMERGENCY DEPT VISIT SF MDM: CPT

## 2024-02-17 SDOH — ECONOMIC STABILITY - HOUSING INSECURITY: HOMELESSNESS UNSPECIFIED: Z59.00

## 2024-02-17 ASSESSMENT — PAIN SCALES - GENERAL: PAINLEVEL_OUTOF10: 5

## 2024-02-17 NOTE — ED PROVIDER NOTES
Mouth/Throat:      Mouth: Mucous membranes are moist.   Eyes:      Conjunctiva/sclera: Conjunctivae normal.      Pupils: Pupils are equal, round, and reactive to light.   Cardiovascular:      Rate and Rhythm: Normal rate and regular rhythm.      Pulses: Normal pulses.      Heart sounds: Normal heart sounds.   Pulmonary:      Effort: Pulmonary effort is normal.      Breath sounds: Normal breath sounds.   Abdominal:      Palpations: Abdomen is soft.      Tenderness: There is no abdominal tenderness.   Musculoskeletal:         General: No swelling or deformity.      Cervical back: Normal range of motion.      Comments: No significant tenderness or swelling of the foot    2+ pulses sensation intact   Skin:     General: Skin is warm.      Findings: No rash.   Neurological:      General: No focal deficit present.      Mental Status: He is alert and oriented to person, place, and time.   Psychiatric:         Mood and Affect: Mood normal.         MEDICAL DECISION MAKING / ED COURSE:     33-year-old presenting with foot pain    Presents almost daily different emergency departments for the same    No new trauma    2+ pulses    Very low suspicion for fracture, cellulitis, ischemic limb    Suspect chronic callus and malingering    Will discharge with podiatry follow-up        DATA FOR LAB AND RADIOLOGY TESTS ORDERED BELOW ARE REVIEWED BY THE ED CLINICIAN:    RADIOLOGY: All x-rays, CT, MRI, and formal ultrasound images (except ED bedside ultrasound) are read by the radiologist, see reports below, unless otherwise noted in MDM or here.  Reports below are reviewed by myself.  No orders to display       LABS: Lab orders shown below, the results are reviewed by myself, and all abnormals are listed below.  Labs Reviewed - No data to display    Vitals Reviewed:    Vitals:    02/16/24 2036   BP: 139/87   Pulse: (!) 123   Resp: 18   SpO2: 99%   Weight: 81.6 kg (180 lb)     MEDICATIONS GIVEN TO PATIENT THIS ENCOUNTER:  No orders of

## 2024-02-17 NOTE — ED TRIAGE NOTES
Pt states he was walking around when his rt foot started hurting. Pt believes he made have stepped on something that went through his shoe. Pt is responding to internal stimuli during triage.

## 2024-02-18 ENCOUNTER — HOSPITAL ENCOUNTER (EMERGENCY)
Age: 34
Discharge: HOME | End: 2024-02-18
Attending: EMERGENCY MEDICINE
Payer: MEDICARE

## 2024-02-18 ENCOUNTER — HOSPITAL ENCOUNTER (EMERGENCY)
Age: 34
Discharge: HOME | End: 2024-02-18
Attending: STUDENT IN AN ORGANIZED HEALTH CARE EDUCATION/TRAINING PROGRAM
Payer: MEDICARE

## 2024-02-18 VITALS
RESPIRATION RATE: 18 BRPM | HEART RATE: 67 BPM | BODY MASS INDEX: 28.25 KG/M2 | WEIGHT: 180 LBS | HEIGHT: 67 IN | SYSTOLIC BLOOD PRESSURE: 145 MMHG | DIASTOLIC BLOOD PRESSURE: 93 MMHG | OXYGEN SATURATION: 99 %

## 2024-02-18 VITALS
TEMPERATURE: 97.9 F | BODY MASS INDEX: 28.25 KG/M2 | DIASTOLIC BLOOD PRESSURE: 89 MMHG | HEART RATE: 98 BPM | WEIGHT: 180 LBS | RESPIRATION RATE: 15 BRPM | OXYGEN SATURATION: 100 % | SYSTOLIC BLOOD PRESSURE: 136 MMHG | HEIGHT: 67 IN

## 2024-02-18 DIAGNOSIS — T69.9XXS: ICD-10-CM

## 2024-02-18 DIAGNOSIS — M79.642 PAIN IN BOTH HANDS: Primary | ICD-10-CM

## 2024-02-18 DIAGNOSIS — T69.9XXA COLD EXPOSURE, INITIAL ENCOUNTER: ICD-10-CM

## 2024-02-18 DIAGNOSIS — Z76.5 MALINGERING: ICD-10-CM

## 2024-02-18 DIAGNOSIS — Z59.00 HOMELESS: Primary | ICD-10-CM

## 2024-02-18 DIAGNOSIS — M79.641 PAIN IN BOTH HANDS: Primary | ICD-10-CM

## 2024-02-18 PROCEDURE — 6370000000 HC RX 637 (ALT 250 FOR IP): Performed by: EMERGENCY MEDICINE

## 2024-02-18 PROCEDURE — 6370000000 HC RX 637 (ALT 250 FOR IP): Performed by: STUDENT IN AN ORGANIZED HEALTH CARE EDUCATION/TRAINING PROGRAM

## 2024-02-18 PROCEDURE — 99283 EMERGENCY DEPT VISIT LOW MDM: CPT

## 2024-02-18 RX ORDER — ACETAMINOPHEN 325 MG/1
650 TABLET ORAL ONCE
Status: COMPLETED | OUTPATIENT
Start: 2024-02-18 | End: 2024-02-18

## 2024-02-18 RX ORDER — CYCLOBENZAPRINE HCL 10 MG
5 TABLET ORAL ONCE
Status: COMPLETED | OUTPATIENT
Start: 2024-02-18 | End: 2024-02-18

## 2024-02-18 RX ORDER — ACETAMINOPHEN 500 MG
1000 TABLET ORAL ONCE
Status: COMPLETED | OUTPATIENT
Start: 2024-02-18 | End: 2024-02-18

## 2024-02-18 RX ADMIN — CYCLOBENZAPRINE 5 MG: 10 TABLET, FILM COATED ORAL at 15:35

## 2024-02-18 RX ADMIN — ACETAMINOPHEN 650 MG: 325 TABLET ORAL at 15:35

## 2024-02-18 RX ADMIN — ACETAMINOPHEN 1000 MG: 500 TABLET ORAL at 20:41

## 2024-02-18 SDOH — ECONOMIC STABILITY - HOUSING INSECURITY: HOMELESSNESS UNSPECIFIED: Z59.00

## 2024-02-18 ASSESSMENT — PAIN - FUNCTIONAL ASSESSMENT
PAIN_FUNCTIONAL_ASSESSMENT: 0-10
PAIN_FUNCTIONAL_ASSESSMENT: 0-10

## 2024-02-18 ASSESSMENT — PAIN SCALES - GENERAL
PAINLEVEL_OUTOF10: 5
PAINLEVEL_OUTOF10: 4
PAINLEVEL_OUTOF10: 5

## 2024-02-18 ASSESSMENT — PAIN DESCRIPTION - LOCATION: LOCATION: HAND

## 2024-02-18 NOTE — ED TRIAGE NOTES
Mode of arrival (squad #, walk in, police, etc) : walk in        Chief complaint(s): bilateral hand and feet numbness/tingling sensation, bilateral hand pain        Arrival Note (brief scenario, treatment PTA, etc).: Patient c/o bilateral hand and feet numbness/ tingling sensation which started 2 weeks ago, bilateral hand pain (chronic) from punching a wall a year ago. Patient is homeless.        C= \"Have you ever felt that you should Cut down on your drinking?\"  No  A= \"Have people Annoyed you by criticizing your drinking?\"  No  G= \"Have you ever felt bad or Guilty about your drinking?\"  No  E= \"Have you ever had a drink as an Eye-opener first thing in the morning to steady your nerves or to help a hangover?\"  No      Deferred []      Reason for deferring: N/A    *If yes to two or more: probable alcohol abuse.*

## 2024-02-18 NOTE — ED NOTES
Patient refused to get temperature taken, offered other route like axilla and temporal however still refusing.

## 2024-02-18 NOTE — ED PROVIDER NOTES
EMERGENCY DEPARTMENT ENCOUNTER    Pt Name: Jose Kaplan  MRN: [id number]  Birthdate 1990  Date of evaluation: 2/18/24  CHIEF COMPLAINT       Chief Complaint   Patient presents with    Numbness     Both hands and feet     HISTORY OF PRESENT ILLNESS   This is a 33-year-old male who is well-known to this emergency department who is presenting for tingling in his hands and feet.  He said that it is very cold outside and he is currently homeless.  He is also complaining of tightness in his muscles.  He is requesting Tylenol and Flexeril.  Patient is also requesting that we give him something to eat and drink.  He said that he is going to go to [place] because the shelters there are not full and he has a doctor that he sees there to get his medications refilled.    The history is provided by the patient.           REVIEW OF SYSTEMS     Review of Systems   Constitutional:  Positive for chills.   Eyes:  Negative for visual disturbance.   Respiratory:  Negative for cough and shortness of breath.    Cardiovascular:  Negative for chest pain.   Gastrointestinal:  Negative for abdominal pain, nausea and vomiting.   Genitourinary:  Negative for flank pain.   Musculoskeletal:  Positive for myalgias.   Neurological:  Negative for dizziness, tremors, seizures, syncope, facial asymmetry, speech difficulty, weakness, light-headedness, numbness and headaches.   Psychiatric/Behavioral:  Negative for behavioral problems.      PASTMEDICAL HISTORY     Past Medical History:   Diagnosis Date    Anxiety     Arthritis     Asthma     Bipolar I disorder, most recent episode (or current) depressed, unspecified 9/12/2014    Clostridium difficile infection     COPD (chronic obstructive pulmonary disease) ([facility])     Depression     Disease of blood and blood forming organ     Eczema     Fracture, metacarpal     R 4th and 5th    Gastric ulcer     Gastritis 06/13/2018    GERD (gastroesophageal reflux disease)     GI bleed     H. pylori infection

## 2024-02-18 NOTE — ED PROVIDER NOTES
Head: Normocephalic and atraumatic.      Nose: Nose normal.      Mouth/Throat:      Mouth: Mucous membranes are moist.   Eyes:      Conjunctiva/sclera: Conjunctivae normal.      Pupils: Pupils are equal, round, and reactive to light.   Cardiovascular:      Rate and Rhythm: Normal rate and regular rhythm.      Pulses: Normal pulses.      Heart sounds: Normal heart sounds.   Pulmonary:      Effort: Pulmonary effort is normal.      Breath sounds: Normal breath sounds.   Abdominal:      Palpations: Abdomen is soft.      Tenderness: There is no abdominal tenderness.   Musculoskeletal:         General: No swelling or deformity.      Cervical back: Normal range of motion.      Comments: Hands have brisk capillary refill no evidence of erythema or blistering    Patient refuses to let me examine anything but his hands   Skin:     General: Skin is warm.      Findings: No rash.   Neurological:      General: No focal deficit present.      Mental Status: He is alert and oriented to person, place, and time.   Psychiatric:         Mood and Affect: Mood normal.         MEDICAL DECISION MAKING / ED COURSE:     33-year-old presents with homelessness and concern his hands are cold    No evidence of frostbite or medical emergency    Patient extremely frequent ER visits    Homeless    Suspect malingering    Medical screening exam completed patient will be discharged        DATA FOR LAB AND RADIOLOGY TESTS ORDERED BELOW ARE REVIEWED BY THE ED CLINICIAN:    RADIOLOGY: All x-rays, CT, MRI, and formal ultrasound images (except ED bedside ultrasound) are read by the radiologist, see reports below, unless otherwise noted in MDM or here.  Reports below are reviewed by myself.  No orders to display       LABS: Lab orders shown below, the results are reviewed by myself, and all abnormals are listed below.  Labs Reviewed - No data to display    Vitals Reviewed:    Vitals:    02/17/24 2135   BP: (!) 145/88   Pulse: 86   Resp: 18   Temp: 97.7 °F

## 2024-02-19 NOTE — ED PROVIDER NOTES
[facility] EMERGENCY DEPARTMENT ENCOUNTER      Pt Name: Jose Kaplan  MRN: [id number]  Birthdate 1990  Date of evaluation: 2/18/24    CHIEF COMPLAINT       Chief Complaint   Patient presents with    Hand Pain    Foot Pain     Pt denies any recent injury        HISTORY OF PRESENT ILLNESS   Jose Kaplan is a 33 y.o. male who presents with hand pain.  Request to get out of the cold for an hour or so.  Well-known to the emergency department.  Multiple ER visits.    PASTMEDICAL HISTORY     Past Medical History:   Diagnosis Date    Anxiety     Arthritis     Asthma     Bipolar I disorder, most recent episode (or current) depressed, unspecified 9/12/2014    Clostridium difficile infection     COPD (chronic obstructive pulmonary disease) ([facility])     Depression     Disease of blood and blood forming organ     Eczema     Fracture, metacarpal     R 4th and 5th    Gastric ulcer     Gastritis 06/13/2018    GERD (gastroesophageal reflux disease)     GI bleed     H. 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[facility])     Substance abuse ([facility])     Hx of opiates, benzos, cocaine, alcohol abuse    Suicidal ideation     Suicide attempt by hanging ([facility])     Tobacco dependence     Ulcerative colitis ([facility])     UTI (urinary tract infection)      Past Problem List  Patient Active Problem List   Diagnosis Code    Opioid abuse (HCC) F11.10    Noncompliance Z91.199    Rectal bleeding K62.5    Smoker F17.200    Juvenile rheumatoid arthritis (HCC) M08.00    SRIRAM (obstructive sleep apnea) G47.33    Primary insomnia F51.01    Calculus of bile duct with acute on chronic cholecystitis K80.46    Mild

## 2024-02-21 ENCOUNTER — HOSPITAL ENCOUNTER (EMERGENCY)
Age: 34
Discharge: HOME | End: 2024-02-21
Attending: STUDENT IN AN ORGANIZED HEALTH CARE EDUCATION/TRAINING PROGRAM
Payer: MEDICARE

## 2024-02-21 VITALS
RESPIRATION RATE: 16 BRPM | SYSTOLIC BLOOD PRESSURE: 134 MMHG | DIASTOLIC BLOOD PRESSURE: 88 MMHG | HEART RATE: 96 BPM | TEMPERATURE: 98.2 F | OXYGEN SATURATION: 98 %

## 2024-02-21 DIAGNOSIS — M79.672 BILATERAL FOOT PAIN: ICD-10-CM

## 2024-02-21 DIAGNOSIS — M79.671 BILATERAL FOOT PAIN: ICD-10-CM

## 2024-02-21 DIAGNOSIS — M79.641 RIGHT HAND PAIN: Primary | ICD-10-CM

## 2024-02-21 PROCEDURE — 99283 EMERGENCY DEPT VISIT LOW MDM: CPT

## 2024-02-21 PROCEDURE — 6370000000 HC RX 637 (ALT 250 FOR IP): Performed by: NURSE PRACTITIONER

## 2024-02-21 RX ORDER — CYCLOBENZAPRINE HCL 5 MG
5 TABLET ORAL ONCE
Status: COMPLETED | OUTPATIENT
Start: 2024-02-21 | End: 2024-02-21

## 2024-02-21 RX ORDER — ACETAMINOPHEN 325 MG/1
650 TABLET ORAL ONCE
Status: COMPLETED | OUTPATIENT
Start: 2024-02-21 | End: 2024-02-21

## 2024-02-21 RX ADMIN — ACETAMINOPHEN 650 MG: 325 TABLET ORAL at 23:35

## 2024-02-21 RX ADMIN — CYCLOBENZAPRINE HYDROCHLORIDE 5 MG: 5 TABLET, FILM COATED ORAL at 23:35

## 2024-02-21 ASSESSMENT — ENCOUNTER SYMPTOMS: COLOR CHANGE: 0

## 2024-02-22 ENCOUNTER — HOSPITAL ENCOUNTER (EMERGENCY)
Age: 34
Discharge: HOME | End: 2024-02-23
Attending: STUDENT IN AN ORGANIZED HEALTH CARE EDUCATION/TRAINING PROGRAM
Payer: MEDICARE

## 2024-02-22 VITALS
RESPIRATION RATE: 18 BRPM | SYSTOLIC BLOOD PRESSURE: 148 MMHG | HEART RATE: 79 BPM | TEMPERATURE: 97.9 F | DIASTOLIC BLOOD PRESSURE: 60 MMHG | OXYGEN SATURATION: 99 %

## 2024-02-22 DIAGNOSIS — M79.641 PAIN IN BOTH HANDS: Primary | ICD-10-CM

## 2024-02-22 DIAGNOSIS — M79.642 PAIN IN BOTH HANDS: Primary | ICD-10-CM

## 2024-02-22 PROCEDURE — 99283 EMERGENCY DEPT VISIT LOW MDM: CPT

## 2024-02-22 NOTE — ED PROVIDER NOTES
or Fever    METFORMIN (GLUCOPHAGE) 500 MG TABLET    Take 1 tablet by mouth 2 times daily (with meals) Indications: last dispensed 1/6/23    NALOXONE 4 MG/0.1ML LIQD NASAL SPRAY        ONDANSETRON (ZOFRAN) 4 MG TABLET    Take 1 tablet by mouth every 8 hours as needed for Nausea or Vomiting    ONDANSETRON (ZOFRAN-ODT) 4 MG DISINTEGRATING TABLET    Take 1 tablet by mouth 3 times daily as needed for Nausea or Vomiting    QUETIAPINE (SEROQUEL) 100 MG TABLET    Take 1 tablet by mouth at bedtime for 7 days    SERTRALINE (ZOLOFT) 25 MG TABLET    Take 1 tablet by mouth daily    TRAZODONE (DESYREL) 50 MG TABLET    Take 1 tablet by mouth nightly as needed for Sleep       PAST MEDICAL HISTORY         Diagnosis Date    Anxiety     Arthritis     Asthma     Bipolar I disorder, most recent episode (or current) depressed, unspecified 9/12/2014    Clostridium difficile infection     COPD (chronic obstructive pulmonary disease) ([facility])     Depression     Disease of blood and blood forming organ     Eczema     Fracture, metacarpal     R 4th and 5th    Gastric ulcer     Gastritis 06/13/2018    GERD (gastroesophageal reflux disease)     GI bleed     H. 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

## 2024-02-22 NOTE — ED NOTES
Presents with c/o bilateral hand and feet pain. States he also needs some of his medications refilled.

## 2024-02-23 PROCEDURE — 6370000000 HC RX 637 (ALT 250 FOR IP): Performed by: STUDENT IN AN ORGANIZED HEALTH CARE EDUCATION/TRAINING PROGRAM

## 2024-02-23 RX ORDER — ACETAMINOPHEN 500 MG
1000 TABLET ORAL ONCE
Status: COMPLETED | OUTPATIENT
Start: 2024-02-23 | End: 2024-02-23

## 2024-02-23 RX ORDER — QUETIAPINE FUMARATE 100 MG/1
100 TABLET, FILM COATED ORAL NIGHTLY
Qty: 7 TABLET | Refills: 0 | Status: SHIPPED | OUTPATIENT
Start: 2024-02-23 | End: 2024-03-01

## 2024-02-23 RX ORDER — ACETAMINOPHEN 500 MG
500 TABLET ORAL 4 TIMES DAILY PRN
Qty: 30 TABLET | Refills: 0 | Status: SHIPPED | OUTPATIENT
Start: 2024-02-23

## 2024-02-23 RX ORDER — QUETIAPINE FUMARATE 25 MG/1
50 TABLET, FILM COATED ORAL ONCE
Status: COMPLETED | OUTPATIENT
Start: 2024-02-23 | End: 2024-02-23

## 2024-02-23 RX ADMIN — QUETIAPINE FUMARATE 50 MG: 25 TABLET ORAL at 00:43

## 2024-02-23 RX ADMIN — ACETAMINOPHEN 1000 MG: 500 TABLET ORAL at 00:43

## 2024-02-23 ASSESSMENT — PAIN SCALES - GENERAL: PAINLEVEL_OUTOF10: 5

## 2024-02-23 NOTE — DISCHARGE INSTRUCTIONS
If given narcotics (opiates) or muscle relaxants during this Emergency Department visit, you should not drink, drive or operate any machinery for at least 6 hours.    Take your medication as indicated and prescribed.  For pain use acetaminophen (Tylenol) or ibuprofen (Motrin / Advil), unless prescribed medications that have acetaminophen or ibuprofen (or similar medications) in it.  You can take over the counter acetaminophen tablets (1 - 2 tablets of the 500-mg strength every 6 hours) or ibuprofen tablets (2 tablets every 4 hours).    Use an ice pack or bag filled with ice and apply to the injured area 3 - 4 times a day for 15 - 20 minutes each time.  If the injury is older than 3 days, then use a heating pad to help relax the muscles.    PLEASE RETURN TO THE EMERGENCY DEPARTMENT IMMEDIATELY for worsening of pain, decrease sensation to arms or legs, inability to move arms or legs, shortness of breath, severe chest pain, excessive nausea or vomiting, notice any bruising to your abdomen or have increase in abdominal pain, or if you develop any concerning symptoms such as: high fever not relieved by acetaminophen (Tylenol) and/or ibuprofen (Motrin / Advil), chills, feeling of your heart fluttering or racing, persistent nausea and/or vomiting, vomiting up blood, blood in your stool, loss of consciousness, numbness, weakness or tingling in the arms or legs or change in color of the extremities, changes in mental status, persistent headache, blurry vision, loss of bladder / bowel control, unable to follow up with your physician, or other any other care or concern.

## 2024-02-24 ENCOUNTER — HOSPITAL ENCOUNTER (EMERGENCY)
Age: 34
Discharge: HOME | End: 2024-02-24
Attending: EMERGENCY MEDICINE
Payer: MEDICARE

## 2024-02-24 VITALS
WEIGHT: 180 LBS | DIASTOLIC BLOOD PRESSURE: 115 MMHG | OXYGEN SATURATION: 100 % | SYSTOLIC BLOOD PRESSURE: 157 MMHG | HEIGHT: 67 IN | BODY MASS INDEX: 28.25 KG/M2 | HEART RATE: 110 BPM | RESPIRATION RATE: 18 BRPM

## 2024-02-24 DIAGNOSIS — F23 ACUTE PSYCHOSIS (HCC): ICD-10-CM

## 2024-02-24 DIAGNOSIS — Z76.0 MEDICATION REFILL: Primary | ICD-10-CM

## 2024-02-24 PROCEDURE — 99283 EMERGENCY DEPT VISIT LOW MDM: CPT

## 2024-02-24 RX ORDER — IBUPROFEN 600 MG/1
600 TABLET ORAL EVERY 8 HOURS PRN
Qty: 15 TABLET | Refills: 0 | Status: SHIPPED | OUTPATIENT
Start: 2024-02-24

## 2024-02-24 RX ORDER — CYCLOBENZAPRINE HCL 10 MG
10 TABLET ORAL 3 TIMES DAILY PRN
Qty: 20 TABLET | Refills: 0 | Status: SHIPPED | OUTPATIENT
Start: 2024-02-24 | End: 2024-02-29

## 2024-02-24 RX ORDER — ALPRAZOLAM 0.5 MG/1
0.5 TABLET ORAL NIGHTLY PRN
Qty: 7 TABLET | Refills: 0 | Status: SHIPPED | OUTPATIENT
Start: 2024-02-24 | End: 2024-02-29

## 2024-02-24 NOTE — ED PROVIDER NOTES
CHOLECYSTECTOMY, LAPAROSCOPIC  07/14/2017    surgery performed at [facility]    COLONOSCOPY      ENDOSCOPY, COLON, DIAGNOSTIC      OTHER SURGICAL HISTORY      lumbar puncture    MI COLONOSCOPY W/BIOPSY SINGLE/MULTIPLE  8/9/2017    COLONOSCOPY WITH BIOPSY performed by Priyanka Mojica MD at [facility]    MI EGD TRANSORAL BIOPSY SINGLE/MULTIPLE N/A 3/20/2017    EGD BIOPSY performed by Jacobo Villalobos MD at [facility]    MI ESOPHAGOGASTRODUODENOSCOPY TRANSORAL DIAGNOSTIC N/A 8/9/2017    EGD ESOPHAGOGASTRODUODENOSCOPY performed by Priyanka Mojica MD at [facility]    SIGMOIDOSCOPY N/A 3/20/2017    SIGMOIDOSCOPY DIAGNOSTIC FLEXIBLE performed by Jacobo Villalobos MD at [facility]    UPPER GASTROINTESTINAL ENDOSCOPY  2/4/16    UPPER GASTROINTESTINAL ENDOSCOPY N/A 6/13/2018    GASTRITIS    UPPER GASTROINTESTINAL ENDOSCOPY N/A 9/20/2018    EGD BIOPSY performed by Marianne Brooke MD at [facility]         HISTORY           Problem Relation Age of Onset    Diabetes Father     Alcohol Abuse Father     Depression Father     Arthritis Father     High Blood Pressure Father     Other Father         aneurysm & epilepsy    Migraines Father     Arthritis Mother     Other Mother         aneurysm & epilepsy    Migraines Mother     Diabetes Brother         Aunt and uncles    Depression Brother     Mental Illness Brother     Other Brother         epilepsy    Migraines Brother     Stroke Other         Uncle    Other Brother         murdered Oct 6th, 2014    Colon Cancer Paternal Cousin 42    Other Sister         epilepsy    Migraines Sister      Family Status   Relation Name Status    Father  Alive    Mother  Alive    Brother  (Not Specified)    Other  (Not Specified)    Brother  (Not Specified)    PCousin  (Not Specified)    Sister  (Not Specified)        SOCIAL HISTORY      reports that he has been smoking cigarettes. He has a 7.5 pack-year smoking history. He has never used smokeless tobacco. He  reports that he does not currently use alcohol. He reports that he does not currently use drugs after having used the following drugs: Cocaine.    REVIEW OFSYSTEMS    (2-9 systems for level 4, 10 or more for level 5)   Review of Systems    Except as noted above the remainder of the review of systems was reviewed and negative.     PHYSICAL EXAM    (up to 7 for level 4, 8 or more for level 5)     ED Triage Vitals [02/24/24 1115]   BP Temp Temp src Pulse Respirations SpO2 Height Weight - Scale   (!) 157/115 -- -- (!) 110 18 100 % 1.702 m (5' 7\") 81.6 kg (180 lb)     Physical Exam  Constitutional:       Appearance: He is well-developed.   HENT:      Head: Normocephalic and atraumatic.   Cardiovascular:      Rate and Rhythm: Normal rate and regular rhythm.   Pulmonary:      Effort: Pulmonary effort is normal.      Breath sounds: Normal breath sounds.   Abdominal:      General: There is no distension.      Palpations: Abdomen is soft.   Musculoskeletal:         General: Normal range of motion.      Cervical back: Normal range of motion and neck supple.   Skin:     General: Skin is warm.   Neurological:      Mental Status: He is alert and oriented to person, place, and time.   Psychiatric:         Behavior: Behavior normal.                 DIAGNOSTIC RESULTS     EKG: All EKG's are interpreted by the Emergency Department Physician who either signs or Co-signs this chart in the absence of a cardiologist.        RADIOLOGY:   Non-plain film images such as CT, Ultrasound and MRI are read by the radiologist. Plain radiographic images arevisualized and preliminarily interpreted by the emergency physician with the below findings:        Interpretation per the Radiologist below, if available at thetime of this note:          ED BEDSIDE ULTRASOUND:   Performed by ED Physician - none    LABS:  Labs Reviewed - No data to display    All other labs were within normal range or not returned as of this dictation.    EMERGENCY DEPARTMENT

## 2024-02-24 NOTE — DISCHARGE INSTRUCTIONS
Take meds as prescribed.  Follow outpatient tomorrow with doctor or by calling [phone] or [phone].   Return to ER immediately if symptoms worsen or persist.    Do not drive, operate machinery, climb or engage in any dangerous activity while taking narcotics or xanax

## 2024-02-25 NOTE — ED PROVIDER NOTES
[facility] EMERGENCY DEPARTMENT ENCOUNTER      Pt Name: Jose Kaplan  MRN: [id number]  Birthdate 1990  Date of evaluation: 2/25/24    CHIEF COMPLAINT       Chief Complaint   Patient presents with    Hand Pain       HISTORY OF PRESENT ILLNESS   Jose Kaplan is a 33 y.o. male who presents with bilateral hand pain.  Well-known to the emergency department.  Multiple ER visits.    PASTMEDICAL HISTORY     Past Medical History:   Diagnosis Date    Anxiety     Arthritis     Asthma     Bipolar I disorder, most recent episode (or current) depressed, unspecified 9/12/2014    Clostridium difficile infection     COPD (chronic obstructive pulmonary disease) ([facility])     Depression     Disease of blood and blood forming organ     Eczema     Fracture, metacarpal     R 4th and 5th    Gastric ulcer     Gastritis 06/13/2018    GERD (gastroesophageal reflux disease)     GI bleed     H. 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[facility])     Substance abuse ([facility])     Hx of opiates, benzos, cocaine, alcohol abuse    Suicidal ideation     Suicide attempt by hanging ([facility])     Tobacco dependence     Ulcerative colitis ([facility])     UTI (urinary tract infection)      Past Problem List  Patient Active Problem List   Diagnosis Code    Opioid abuse ([facility]) F11.10    Noncompliance Z91.199    Rectal bleeding K62.5    Smoker F17.200    Juvenile rheumatoid arthritis ([facility]) M08.00    SRIRAM (obstructive sleep apnea) G47.33    Primary insomnia F51.01    Calculus of bile duct with acute on chronic cholecystitis K80.46    Mild intermittent asthma without complication J45.20    Gastritis K29.70    Generalized abdominal

## 2024-02-26 ENCOUNTER — HOSPITAL ENCOUNTER (EMERGENCY)
Age: 34
Discharge: HOME | End: 2024-02-26
Attending: EMERGENCY MEDICINE
Payer: MEDICARE

## 2024-02-26 VITALS
WEIGHT: 180 LBS | DIASTOLIC BLOOD PRESSURE: 97 MMHG | SYSTOLIC BLOOD PRESSURE: 133 MMHG | BODY MASS INDEX: 28.25 KG/M2 | HEIGHT: 67 IN | OXYGEN SATURATION: 97 % | RESPIRATION RATE: 16 BRPM | HEART RATE: 110 BPM | TEMPERATURE: 98.4 F

## 2024-02-26 DIAGNOSIS — Z76.5 MALINGERING: Primary | ICD-10-CM

## 2024-02-26 PROCEDURE — 99282 EMERGENCY DEPT VISIT SF MDM: CPT

## 2024-02-26 ASSESSMENT — PAIN DESCRIPTION - LOCATION: LOCATION: HAND

## 2024-02-26 ASSESSMENT — PAIN SCALES - GENERAL: PAINLEVEL_OUTOF10: 10

## 2024-02-26 ASSESSMENT — PAIN DESCRIPTION - ORIENTATION: ORIENTATION: RIGHT;LEFT

## 2024-02-27 NOTE — ED PROVIDER NOTES
EMERGENCY DEPARTMENT ENCOUNTER    Pt Name: Jose Kaplan  MRN: [id number]  Birthdate 1990  Date of evaluation: 2/27/24  CHIEF COMPLAINT       Chief Complaint   Patient presents with    Hand Pain     Requests a room with a bed, his anxiety meds, flexeril, and tylenol     HISTORY OF PRESENT ILLNESS   HPI  33-year-old male with a history of homelessness, bipolar, rheumatoid arthritis, substance abuse, very frequent ED visits presents to the ED complaining of foot pain.  Initially in triage patient complaining of hand pain, when I asked him he initially was sleeping and not interested in answering my questions, eventually said that his foot hurt.  Patient states that he did not want any testing or anything else, just wanted refills of his medications.    REVIEW OF SYSTEMS     Review of Systems   All other systems reviewed and are negative.    PASTMEDICAL HISTORY     Past Medical History:   Diagnosis Date    Anxiety     Arthritis     Asthma     Bipolar I disorder, most recent episode (or current) depressed, unspecified 9/12/2014    Clostridium difficile infection     COPD (chronic obstructive pulmonary disease) ([facility])     Depression     Disease of blood and blood forming organ     Eczema     Fracture, metacarpal     R 4th and 5th    Gastric ulcer     Gastritis 06/13/2018    GERD (gastroesophageal reflux disease)     GI bleed     H. 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

## 2024-02-27 NOTE — DISCHARGE INSTRUCTIONS
Follow-up with your primary care doctor.  If you have any concerning symptoms come back to the ER.

## 2024-02-29 ENCOUNTER — HOSPITAL ENCOUNTER (EMERGENCY)
Age: 34
Discharge: HOME | End: 2024-02-29
Attending: EMERGENCY MEDICINE
Payer: MEDICARE

## 2024-02-29 VITALS
TEMPERATURE: 98 F | OXYGEN SATURATION: 99 % | RESPIRATION RATE: 16 BRPM | HEIGHT: 67 IN | SYSTOLIC BLOOD PRESSURE: 137 MMHG | HEART RATE: 82 BPM | DIASTOLIC BLOOD PRESSURE: 84 MMHG | WEIGHT: 180 LBS | BODY MASS INDEX: 28.25 KG/M2

## 2024-02-29 DIAGNOSIS — Z76.0 ENCOUNTER FOR MEDICATION REFILL: Primary | ICD-10-CM

## 2024-02-29 DIAGNOSIS — B35.3 TINEA PEDIS, UNSPECIFIED LATERALITY: ICD-10-CM

## 2024-02-29 DIAGNOSIS — Z76.0 MEDICATION REFILL: ICD-10-CM

## 2024-02-29 PROCEDURE — 99283 EMERGENCY DEPT VISIT LOW MDM: CPT

## 2024-02-29 RX ORDER — CLOTRIMAZOLE AND BETAMETHASONE DIPROPIONATE 10; .64 MG/G; MG/G
CREAM TOPICAL
Qty: 45 G | Refills: 0 | Status: SHIPPED | OUTPATIENT
Start: 2024-02-29

## 2024-02-29 RX ORDER — ALPRAZOLAM 0.5 MG/1
0.5 TABLET ORAL NIGHTLY PRN
Qty: 7 TABLET | Refills: 0 | Status: SHIPPED | OUTPATIENT
Start: 2024-02-29 | End: 2024-03-07

## 2024-02-29 RX ORDER — CYCLOBENZAPRINE HCL 10 MG
10 TABLET ORAL 3 TIMES DAILY PRN
Qty: 20 TABLET | Refills: 0 | Status: SHIPPED | OUTPATIENT
Start: 2024-02-29

## 2024-03-01 ENCOUNTER — HOSPITAL ENCOUNTER (EMERGENCY)
Age: 34
Discharge: HOME | End: 2024-03-02
Attending: EMERGENCY MEDICINE
Payer: MEDICARE

## 2024-03-01 VITALS
HEIGHT: 67 IN | RESPIRATION RATE: 19 BRPM | DIASTOLIC BLOOD PRESSURE: 94 MMHG | HEART RATE: 100 BPM | BODY MASS INDEX: 28.25 KG/M2 | TEMPERATURE: 97 F | WEIGHT: 180 LBS | OXYGEN SATURATION: 99 % | SYSTOLIC BLOOD PRESSURE: 127 MMHG

## 2024-03-01 DIAGNOSIS — F41.9 ANXIETY: Primary | ICD-10-CM

## 2024-03-01 PROCEDURE — 99283 EMERGENCY DEPT VISIT LOW MDM: CPT

## 2024-03-01 PROCEDURE — 6370000000 HC RX 637 (ALT 250 FOR IP): Performed by: EMERGENCY MEDICINE

## 2024-03-01 RX ORDER — ACETAMINOPHEN 325 MG/1
650 TABLET ORAL ONCE
Status: COMPLETED | OUTPATIENT
Start: 2024-03-01 | End: 2024-03-01

## 2024-03-01 RX ORDER — ALPRAZOLAM 0.25 MG/1
0.5 TABLET ORAL ONCE
Status: COMPLETED | OUTPATIENT
Start: 2024-03-01 | End: 2024-03-01

## 2024-03-01 RX ADMIN — ACETAMINOPHEN 650 MG: 325 TABLET ORAL at 22:57

## 2024-03-01 RX ADMIN — ALPRAZOLAM 0.5 MG: 0.25 TABLET ORAL at 22:57

## 2024-03-01 ASSESSMENT — PAIN DESCRIPTION - ORIENTATION: ORIENTATION: RIGHT

## 2024-03-01 ASSESSMENT — PAIN DESCRIPTION - LOCATION: LOCATION: HAND

## 2024-03-01 ASSESSMENT — PAIN - FUNCTIONAL ASSESSMENT: PAIN_FUNCTIONAL_ASSESSMENT: 0-10

## 2024-03-01 ASSESSMENT — PAIN SCALES - GENERAL: PAINLEVEL_OUTOF10: 4

## 2024-03-01 ASSESSMENT — LIFESTYLE VARIABLES
HOW OFTEN DO YOU HAVE A DRINK CONTAINING ALCOHOL: NEVER
HOW MANY STANDARD DRINKS CONTAINING ALCOHOL DO YOU HAVE ON A TYPICAL DAY: PATIENT DOES NOT DRINK

## 2024-03-01 NOTE — DISCHARGE INSTRUCTIONS
Take meds as prescribed.  Follow outpatient tomorrow with doctor or by calling [phone] or [phone].   Return to ER immediately if symptoms worsen or persist.    Do not drive, operate machinery, climb or engage in any dangerous activity while taking narcotics or xanax.

## 2024-03-01 NOTE — DISCHARGE INSTR - COC
Continuity of Care Form    Patient Name: Jose Kaplan   :  1990  MRN:  [id number]    Admit date:  2024  Discharge date:  ***    Code Status Order: Prior   Advance Directives:     Admitting Physician:  No admitting provider for patient encounter.  PCP: No primary care provider on file.    Discharging Nurse: ***  Discharging Hospital Unit/Room#: STA04/04  Discharging Unit Phone Number: ***    Emergency Contact:   Extended Emergency Contact Information  Primary Emergency Contact: Ann Pizarro  Address: [facility] [address]  Home Phone: [phone]  Work Phone: [phone]  Mobile Phone: [phone]  Relation: Parent  Hearing or visual needs: None  Other needs: None  Preferred language: English   needed? No  Secondary Emergency Contact: Michael Kaplan  Home Phone: [phone]  Relation: Brother/Sister    Past Surgical History:  Past Surgical History:   Procedure Laterality Date    ABDOMEN SURGERY      upper GI scope 2015    BRONCHOSCOPY      CHOLECYSTECTOMY, LAPAROSCOPIC  2017    surgery performed at [facility]    COLONOSCOPY      ENDOSCOPY, COLON, DIAGNOSTIC      OTHER SURGICAL HISTORY      lumbar puncture    AR COLONOSCOPY W/BIOPSY SINGLE/MULTIPLE  2017    COLONOSCOPY WITH BIOPSY performed by Priyanka Mojica MD at [facility]    AR EGD TRANSORAL BIOPSY SINGLE/MULTIPLE N/A 3/20/2017    EGD BIOPSY performed by Jacobo Villalobos MD at [facility]    AR ESOPHAGOGASTRODUODENOSCOPY TRANSORAL DIAGNOSTIC N/A 2017    EGD ESOPHAGOGASTRODUODENOSCOPY performed by Priyanka Mojica MD at [facility]    SIGMOIDOSCOPY N/A 3/20/2017    SIGMOIDOSCOPY DIAGNOSTIC FLEXIBLE performed by Jacobo Villalobos MD at [facility]    UPPER GASTROINTESTINAL ENDOSCOPY  16    UPPER GASTROINTESTINAL ENDOSCOPY N/A 2018    GASTRITIS    UPPER GASTROINTESTINAL ENDOSCOPY N/A 2018    EGD BIOPSY

## 2024-03-02 NOTE — ED NOTES
Writer in to discharge pt. Pt was sleeping. When pt woke, pt states he needs to have time to let the medications kick in. Pt was informed that the medications had been given 1 hour ago and that was time for them to work. Pt states his hands do not feel better and he needs to talk to the doctor before he will leave.

## 2024-03-02 NOTE — ED NOTES
Writer in to discharge pt. Pt sleeping again. Pt was woke up. Discharge information given to pt. Pt refused to take his paperwork. Pt states \"thank you\" and closed his eyes again. At this time, public safety is at the room and informed pt he would need to leave. Pt then gets up and is walked out by public safety. Pt walks with a steady gait without assistance.

## 2024-03-02 NOTE — ED PROVIDER NOTES
[facility] ED  EMERGENCY DEPARTMENT ENCOUNTER      Pt Name: Jose Kaplan  MRN: [id number]  Birthdate 1990  Date of evaluation: 3/2/24      CHIEF COMPLAINT       Chief Complaint   Patient presents with    Anxiety    Hand Pain         HISTORY OF PRESENT ILLNESS   HPI 33 y.o. male presents with c/o hand pain, this has been a chronic problem for him, denies any new injury denies punching anything.  He says he just like some Tylenol.  He has had multiple evaluations for this.    Second complaint is he would like a dose of his anxiety medicine..  Reports that he is prescribed Xanax he says he went to the pharmacy to try to get it but they were unable to fill his medication.  I did review his OARRS report.  He says he would like a dose of his Xanax.  Reports he takes his 0.5 mg tablets which this was confirmed reviewing the OARRS report.    Finally the patient reports that he is homeless, he says that he would like to sleep in the room for a while.    REVIEW OF SYSTEMS       Review of Systems  No fevers no cough no chest pain no suicidal thoughts.  PAST MEDICAL HISTORY     Past Medical History:   Diagnosis Date    Anxiety     Arthritis     Asthma     Bipolar I disorder, most recent episode (or current) depressed, unspecified 9/12/2014    Clostridium difficile infection     COPD (chronic obstructive pulmonary disease) ([facility])     Depression     Disease of blood and blood forming organ     Eczema     Fracture, metacarpal     R 4th and 5th    Gastric ulcer     Gastritis 06/13/2018    GERD (gastroesophageal reflux disease)     GI bleed     H. 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[facility])     PUD (peptic ulcer disease)     Rheumatoid arthritis ([facility])     Rheumatoid arthritis(714.0)     Severe recurrent major depression without psychotic features ([facility]) 11/21/2021    Sleep apnea     Still's  mouth every 8 hours as needed for Pain, Disp-15 tablet, R-0Normal      acetaminophen (TYLENOL) 500 MG tablet Take 1 tablet by mouth 4 times daily as needed for Pain, Disp-30 tablet, R-0Normal      QUEtiapine (SEROQUEL) 100 MG tablet Take 1 tablet by mouth at bedtime for 7 days, Disp-7 tablet, R-0Normal      ondansetron (ZOFRAN-ODT) 4 MG disintegrating tablet Take 1 tablet by mouth 3 times daily as needed for Nausea or Vomiting, Disp-21 tablet, R-0Normal      diclofenac sodium (VOLTAREN) 1 % GEL Apply 4 g topically 4 times daily for 5 days, Topical, 4 TIMES DAILY Starting Sat 1/27/2024, Until Thu 2/1/2024, For 5 days, Disp-50 g, R-0, Normal      ondansetron (ZOFRAN) 4 MG tablet Take 1 tablet by mouth every 8 hours as needed for Nausea or Vomiting, Disp-12 tablet, R-0Print      metFORMIN (GLUCOPHAGE) 500 MG tablet Take 1 tablet by mouth 2 times daily (with meals) Indications: last dispensed 1/6/23, Disp-60 tablet, R-0Print      carBAMazepine (TEGRETOL) 200 MG tablet Historical Med      cloNIDine (CATAPRES) 0.1 MG tablet Historical Med      gabapentin (NEURONTIN) 300 MG capsule Historical Med      naloxone 4 MG/0.1ML LIQD nasal spray Historical Med      Benzocaine-Menthol (CEPACOL) 6-10 MG LOZG lozenge Take 1 lozenge by mouth every 2 hours as needed for Sore Throat, Disp-30 lozenge, R-0Print      sertraline (ZOLOFT) 25 MG tablet Take 1 tablet by mouth daily, Disp-30 tablet, R-3Normal      traZODone (DESYREL) 50 MG tablet Take 1 tablet by mouth nightly as needed for SleepHistorical Med             ALLERGIES     is allergic to dicyclomine, famotidine, geodon [ziprasidone hcl], haloperidol, iv dye [iodides], reglan [metoclopramide], ibuprofen, aspirin, dicyclomine hcl, hydroxyzine hcl, iodine, metronidazole, mirtazapine, ondansetron hcl, promethazine, and ziprasidone.    FAMILY HISTORY     He indicated that his mother is alive. He indicated that his father is alive. He indicated that the status of his sister is unknown.

## 2024-03-04 ENCOUNTER — HOSPITAL ENCOUNTER (EMERGENCY)
Age: 34
Discharge: HOME | End: 2024-03-05
Attending: EMERGENCY MEDICINE
Payer: MEDICARE

## 2024-03-04 VITALS
BODY MASS INDEX: 28.19 KG/M2 | WEIGHT: 180 LBS | RESPIRATION RATE: 18 BRPM | OXYGEN SATURATION: 98 % | TEMPERATURE: 97.8 F | SYSTOLIC BLOOD PRESSURE: 117 MMHG | HEART RATE: 105 BPM | DIASTOLIC BLOOD PRESSURE: 88 MMHG

## 2024-03-04 DIAGNOSIS — J11.1 INFLUENZA: Primary | ICD-10-CM

## 2024-03-04 PROCEDURE — 99283 EMERGENCY DEPT VISIT LOW MDM: CPT

## 2024-03-04 PROCEDURE — 87636 SARSCOV2 & INF A&B AMP PRB: CPT

## 2024-03-05 LAB
FLUAV RNA RESP QL NAA+PROBE: NOT DETECTED
FLUBV RNA RESP QL NAA+PROBE: DETECTED
SARS-COV-2 RNA RESP QL NAA+PROBE: NOT DETECTED
SOURCE: ABNORMAL
SPECIMEN DESCRIPTION: ABNORMAL

## 2024-03-05 RX ORDER — ACETAMINOPHEN 500 MG
1000 TABLET ORAL EVERY 6 HOURS PRN
Qty: 20 TABLET | Refills: 0 | Status: SHIPPED | OUTPATIENT
Start: 2024-03-05

## 2024-03-05 NOTE — ED NOTES
Patient states that he is requesting refill of prescribed anxiety medications. Currently cooperative at this time.

## 2024-03-05 NOTE — DISCHARGE INSTRUCTIONS
Take meds as prescribed.  Follow outpatient tomorrow with doctor or by calling [phone] or [phone].   Return to ER immediately if symptoms worsen or persist.

## 2024-03-06 NOTE — ED PROVIDER NOTES
eMERGENCY dEPARTMENT eNCOUnter   Independent Attestation     Pt Name: Jose Kaplan  MRN: [id number]  Birthdate 1990  Date of evaluation: 3/6/24     Jose Kaplan is a 33 y.o. male with CC: Medication Refill (Wants medication refill on his anxiety medication. )      Based on the medical record the care appears appropriate.  I was personally available for consultation in the Emergency Department.    Mandi Fournier MD  Attending Emergency Physician                  Mandi Fournier MD  03/06/24 7721    
epilepsy    Migraines Sister      Family Status   Relation Name Status    Father  Alive    Mother  Alive    Brother  (Not Specified)    Other  (Not Specified)    Brother  (Not Specified)    PCousin  (Not Specified)    Sister  (Not Specified)        SOCIAL HISTORY      reports that he has been smoking cigarettes. He has a 7.5 pack-year smoking history. He has never used smokeless tobacco. He reports that he does not currently use alcohol. He reports that he does not currently use drugs after having used the following drugs: Cocaine.    REVIEW OFSYSTEMS    (2-9 systems for level 4, 10 or more for level 5)   Review of Systems    Except as noted above the remainder of the review of systems was reviewed and negative.     PHYSICAL EXAM    (up to 7 for level 4, 8 or more for level 5)     ED Triage Vitals [03/04/24 2108]   BP Temp Temp Source Pulse Respirations SpO2 Height Weight - Scale   117/88 97.8 °F (36.6 °C) Oral (!) 105 18 98 % -- 81.6 kg (180 lb)     Physical Exam  Constitutional:       Appearance: He is well-developed.   HENT:      Head: Normocephalic and atraumatic.   Cardiovascular:      Rate and Rhythm: Normal rate and regular rhythm.   Pulmonary:      Effort: Pulmonary effort is normal.      Breath sounds: Normal breath sounds.   Abdominal:      Palpations: Abdomen is soft.   Musculoskeletal:         General: Normal range of motion.      Cervical back: Normal range of motion and neck supple.   Skin:     General: Skin is warm.   Neurological:      Mental Status: He is alert and oriented to person, place, and time.   Psychiatric:         Behavior: Behavior normal.                 DIAGNOSTIC RESULTS     EKG: All EKG's are interpreted by the Emergency Department Physician who either signs or Co-signs this chart in the absence of a cardiologist.        RADIOLOGY:   Non-plain film images such as CT, Ultrasound and MRI are read by the radiologist. Plain radiographic images arevisualized and preliminarily interpreted

## 2024-03-11 ENCOUNTER — HOSPITAL ENCOUNTER (EMERGENCY)
Age: 34
Discharge: HOME | End: 2024-03-11
Attending: EMERGENCY MEDICINE
Payer: MEDICARE

## 2024-03-11 VITALS
TEMPERATURE: 98.2 F | RESPIRATION RATE: 18 BRPM | SYSTOLIC BLOOD PRESSURE: 144 MMHG | WEIGHT: 200 LBS | DIASTOLIC BLOOD PRESSURE: 82 MMHG | OXYGEN SATURATION: 97 % | BODY MASS INDEX: 31.39 KG/M2 | HEART RATE: 58 BPM | HEIGHT: 67 IN

## 2024-03-11 DIAGNOSIS — F41.9 ANXIETY: Primary | ICD-10-CM

## 2024-03-11 DIAGNOSIS — M79.671 PAIN IN BOTH FEET: ICD-10-CM

## 2024-03-11 DIAGNOSIS — M79.672 PAIN IN BOTH FEET: ICD-10-CM

## 2024-03-11 DIAGNOSIS — Z76.0 ENCOUNTER FOR MEDICATION REFILL: ICD-10-CM

## 2024-03-11 PROCEDURE — 99283 EMERGENCY DEPT VISIT LOW MDM: CPT

## 2024-03-11 PROCEDURE — 6370000000 HC RX 637 (ALT 250 FOR IP): Performed by: NURSE PRACTITIONER

## 2024-03-11 RX ORDER — ACETAMINOPHEN 325 MG/1
650 TABLET ORAL ONCE
Status: COMPLETED | OUTPATIENT
Start: 2024-03-11 | End: 2024-03-11

## 2024-03-11 RX ORDER — ACETAMINOPHEN 325 MG/1
650 TABLET ORAL EVERY 6 HOURS PRN
Qty: 20 TABLET | Refills: 0 | Status: SHIPPED | OUTPATIENT
Start: 2024-03-11 | End: 2024-03-13

## 2024-03-11 RX ORDER — ALPRAZOLAM 0.5 MG/1
0.5 TABLET ORAL ONCE
Status: COMPLETED | OUTPATIENT
Start: 2024-03-11 | End: 2024-03-11

## 2024-03-11 RX ORDER — CYCLOBENZAPRINE HCL 10 MG
10 TABLET ORAL 3 TIMES DAILY PRN
Qty: 12 TABLET | Refills: 0 | Status: SHIPPED | OUTPATIENT
Start: 2024-03-11 | End: 2024-03-13

## 2024-03-11 RX ADMIN — ALPRAZOLAM 0.5 MG: 0.5 TABLET ORAL at 23:11

## 2024-03-11 RX ADMIN — ACETAMINOPHEN 650 MG: 325 TABLET ORAL at 23:11

## 2024-03-11 ASSESSMENT — ENCOUNTER SYMPTOMS
SHORTNESS OF BREATH: 0
COLOR CHANGE: 0

## 2024-03-12 ENCOUNTER — HOSPITAL ENCOUNTER (EMERGENCY)
Age: 34
Discharge: HOME | End: 2024-03-12
Attending: STUDENT IN AN ORGANIZED HEALTH CARE EDUCATION/TRAINING PROGRAM
Payer: MEDICARE

## 2024-03-12 VITALS
RESPIRATION RATE: 16 BRPM | OXYGEN SATURATION: 94 % | DIASTOLIC BLOOD PRESSURE: 76 MMHG | SYSTOLIC BLOOD PRESSURE: 113 MMHG | HEART RATE: 84 BPM

## 2024-03-12 DIAGNOSIS — G89.29 CHRONIC PAIN OF BOTH FEET: Primary | ICD-10-CM

## 2024-03-12 DIAGNOSIS — M79.671 CHRONIC PAIN OF BOTH FEET: Primary | ICD-10-CM

## 2024-03-12 DIAGNOSIS — M79.672 CHRONIC PAIN OF BOTH FEET: Primary | ICD-10-CM

## 2024-03-12 PROCEDURE — 99283 EMERGENCY DEPT VISIT LOW MDM: CPT

## 2024-03-12 PROCEDURE — 6370000000 HC RX 637 (ALT 250 FOR IP): Performed by: NURSE PRACTITIONER

## 2024-03-12 RX ORDER — ACETAMINOPHEN 325 MG/1
650 TABLET ORAL ONCE
Status: COMPLETED | OUTPATIENT
Start: 2024-03-13 | End: 2024-03-12

## 2024-03-12 RX ADMIN — ACETAMINOPHEN 650 MG: 325 TABLET ORAL at 23:53

## 2024-03-12 ASSESSMENT — PAIN DESCRIPTION - LOCATION: LOCATION: HAND;FOOT

## 2024-03-12 ASSESSMENT — PAIN - FUNCTIONAL ASSESSMENT: PAIN_FUNCTIONAL_ASSESSMENT: 0-10

## 2024-03-12 ASSESSMENT — PAIN DESCRIPTION - ORIENTATION: ORIENTATION: RIGHT;LEFT

## 2024-03-12 ASSESSMENT — PAIN SCALES - GENERAL: PAINLEVEL_OUTOF10: 10

## 2024-03-12 NOTE — ED PROVIDER NOTES
eMERGENCY dEPARTMENT eNCOUnter   Independent Attestation     Pt Name: Jose Kaplan  MRN: [id number]  Birthdate 1990  Date of evaluation: 3/12/24     Jose Kaplan is a 33 y.o. male with CC: Pain (Pain to hands and feet)      Based on the medical record the care appears appropriate.  I was personally available for consultation in the Emergency Department.    Mandi Fournier MD  Attending Emergency Physician                  Mandi Fournier MD  03/12/24 0104    
[id number]  [phone]    If symptoms worsen      DISCHARGE MEDICATIONS:     New Prescriptions    ACETAMINOPHEN (TYLENOL) 325 MG TABLET    Take 2 tablets by mouth every 6 hours as needed for Pain    CYCLOBENZAPRINE (FLEXERIL) 10 MG TABLET    Take 1 tablet by mouth 3 times daily as needed for Muscle spasms           (Please note that portions of this note were completed with a voice recognition program.  Efforts were made to edit the dictations but occasionally words are mis-transcribed.)    MASSIEL Arteaga CNP, Eric, APRN - CNP  03/11/24 0519       Miki Ritchie APRN - CNP  03/11/24 0010

## 2024-03-12 NOTE — DISCHARGE INSTRUCTIONS
Take medications as prescribed.  Flexeril can cause drowsiness.  Follow-up with primary care provider on list provided.  Return to emergency department for worsening or new symptoms.

## 2024-03-13 ENCOUNTER — HOSPITAL ENCOUNTER (EMERGENCY)
Age: 34
Discharge: HOME | End: 2024-03-13
Attending: STUDENT IN AN ORGANIZED HEALTH CARE EDUCATION/TRAINING PROGRAM
Payer: MEDICARE

## 2024-03-13 VITALS
RESPIRATION RATE: 16 BRPM | HEIGHT: 67 IN | WEIGHT: 200 LBS | HEART RATE: 86 BPM | TEMPERATURE: 98.2 F | BODY MASS INDEX: 31.39 KG/M2 | DIASTOLIC BLOOD PRESSURE: 82 MMHG | OXYGEN SATURATION: 95 % | SYSTOLIC BLOOD PRESSURE: 116 MMHG

## 2024-03-13 DIAGNOSIS — M79.673 CHRONIC FOOT PAIN, UNSPECIFIED LATERALITY: Primary | ICD-10-CM

## 2024-03-13 DIAGNOSIS — G89.29 CHRONIC FOOT PAIN, UNSPECIFIED LATERALITY: Primary | ICD-10-CM

## 2024-03-13 PROCEDURE — 6370000000 HC RX 637 (ALT 250 FOR IP)

## 2024-03-13 PROCEDURE — 99283 EMERGENCY DEPT VISIT LOW MDM: CPT

## 2024-03-13 RX ORDER — ACETAMINOPHEN 500 MG
1000 TABLET ORAL 3 TIMES DAILY PRN
Qty: 180 TABLET | Refills: 0 | Status: SHIPPED | OUTPATIENT
Start: 2024-03-13 | End: 2024-04-12

## 2024-03-13 RX ORDER — CYCLOBENZAPRINE HCL 5 MG
5 TABLET ORAL 2 TIMES DAILY PRN
Qty: 10 TABLET | Refills: 0 | Status: SHIPPED | OUTPATIENT
Start: 2024-03-13 | End: 2024-03-18

## 2024-03-13 RX ORDER — IBUPROFEN 600 MG/1
600 TABLET ORAL EVERY 6 HOURS PRN
Qty: 120 TABLET | Refills: 0 | Status: SHIPPED | OUTPATIENT
Start: 2024-03-13

## 2024-03-13 RX ORDER — ACETAMINOPHEN 500 MG
1000 TABLET ORAL ONCE
Status: COMPLETED | OUTPATIENT
Start: 2024-03-13 | End: 2024-03-13

## 2024-03-13 RX ORDER — CYCLOBENZAPRINE HCL 5 MG
5 TABLET ORAL ONCE
Status: COMPLETED | OUTPATIENT
Start: 2024-03-13 | End: 2024-03-13

## 2024-03-13 RX ADMIN — CYCLOBENZAPRINE HYDROCHLORIDE 5 MG: 5 TABLET, FILM COATED ORAL at 22:34

## 2024-03-13 RX ADMIN — ACETAMINOPHEN 1000 MG: 500 TABLET ORAL at 22:34

## 2024-03-13 ASSESSMENT — ENCOUNTER SYMPTOMS
ABDOMINAL PAIN: 0
VOMITING: 0
NAUSEA: 0
SHORTNESS OF BREATH: 0

## 2024-03-13 NOTE — ED PROVIDER NOTES
[facility] ED  eMERGENCY dEPARTMENT eNCOUnter      Pt Name: Jose Kaplan  MRN: [id number]  Birthdate 1990  Date of evaluation: 3/12/2024  Provider: MASSIEL Arteaga CNP    CHIEF COMPLAINT       Chief Complaint   Patient presents with    Hand Pain     Wants hands and feet checked         HISTORY OF PRESENT ILLNESS  (Location/Symptom, Timing/Onset, Context/Setting, Quality, Duration, Modifying Factors, Severity.)   Jose Kaplan is a 33 y.o. male who presents to the emergency department for evaluation of bilateral foot pain.  Patient states he has been walking a lot today.  Denies specific injury.  No fevers or chills.  States he has had some intermittent soreness to both his hands but denies injury to the hands.      Nursing Notes were reviewed.    ALLERGIES     Dicyclomine, Famotidine, Geodon [ziprasidone hcl], Haloperidol, Iv dye [iodides], Reglan [metoclopramide], Ibuprofen, Aspirin, Dicyclomine hcl, Hydroxyzine hcl, Iodine, Metronidazole, Mirtazapine, Ondansetron hcl, Promethazine, and Ziprasidone    CURRENT MEDICATIONS       Previous Medications    ACETAMINOPHEN (TYLENOL) 325 MG TABLET    Take 2 tablets by mouth every 6 hours as needed for Pain    BENZOCAINE-MENTHOL (CEPACOL) 6-10 MG LOZG LOZENGE    Take 1 lozenge by mouth every 2 hours as needed for Sore Throat    CARBAMAZEPINE (TEGRETOL) 200 MG TABLET        CLONIDINE (CATAPRES) 0.1 MG TABLET        CLOTRIMAZOLE-BETAMETHASONE (LOTRISONE) 1-0.05 % CREAM    Apply topically 2 times daily to feet.    CYCLOBENZAPRINE (FLEXERIL) 10 MG TABLET    Take 1 tablet by mouth 3 times daily as needed for Muscle spasms    DICLOFENAC SODIUM (VOLTAREN) 1 % GEL    Apply 4 g topically 4 times daily for 5 days    GABAPENTIN (NEURONTIN) 300 MG CAPSULE        IBUPROFEN (ADVIL;MOTRIN) 600 MG TABLET    Take 1 tablet by mouth every 8 hours as needed for Pain    METFORMIN (GLUCOPHAGE) 500 MG TABLET    Take 1 tablet by mouth 2 times daily (with meals)

## 2024-03-14 ENCOUNTER — HOSPITAL ENCOUNTER (EMERGENCY)
Age: 34
Discharge: LEFT BEFORE DISCHARGE | End: 2024-03-14

## 2024-03-14 NOTE — DISCHARGE INSTR - COC
Treatments: ***  Oxygen Therapy:  {Therapy; copd oxygen:55143}  Ventilator:    {[facility] Vent List:657266965}    Rehab Therapies: {THERAPEUTIC INTERVENTION:2207903232}  Weight Bearing Status/Restrictions: {[facility] Weight Bearin}  Other Medical Equipment (for information only, NOT a DME order):  {EQUIPMENT:408569577}  Other Treatments: ***    Patient's personal belongings (please select all that are sent with patient):  {P DME Belongings:254317026}    RN SIGNATURE:  {Esignature:858435312}    CASE MANAGEMENT/SOCIAL WORK SECTION    Inpatient Status Date: ***    Readmission Risk Assessment Score:  Readmission Risk              Risk of Unplanned Readmission:  0           Discharging to Facility/ Agency   Name:   Address:  Phone:  Fax:    Dialysis Facility (if applicable)   Name:  Address:  Dialysis Schedule:  Phone:  Fax:    / signature: {Esignature:797858580}    PHYSICIAN SECTION    Prognosis: {Prognosis:3424862242}    Condition at Discharge: { Patient Condition:352113510}    Rehab Potential (if transferring to Rehab): {Prognosis:8328873655}    Recommended Labs or Other Treatments After Discharge: ***    Physician Certification: I certify the above information and transfer of Jose Kaplan  is necessary for the continuing treatment of the diagnosis listed and that he requires {Admit to Appropriate Level of Care:33757} for {GREATER/LESS:701600305} 30 days.     Update Admission H&P: {CHP DME Changes in HandP:800759256}    PHYSICIAN SIGNATURE:  {Esignature:068738562}

## 2024-03-14 NOTE — DISCHARGE INSTRUCTIONS
Please follow-up with primary care of your psychiatrist.   [phone] to establish care with a new doctor.    PLEASE RETURN TO THE EMERGENCY DEPARTMENT IMMEDIATELY if your symptoms worsen in anyway or in 8-12 hours if not improved for re-evaluation.  You should immediately return to the ER for symptoms such as increasing pain, bloody stool, fever, a feeling of passing out, light headed, dizziness, chest pain, shortness of breath, persistent nausea and/or vomiting, numbness or weakness to the arms or legs, coolness or color change of the arms or legs.      Take your medication as indicated and prescribed.  If you are given an antibiotic then, make sure you get the prescription filled and take the antibiotics until finished.      THANK YOU!!!    On behalf of the Emergency Department staff at [facility], I would like to thank you for giving us the opportunity to address your health care needs and concerns.    We hope that during your visit, our service was delivered in a professional and caring manner. Please keep [facility] in mind as we walk with you down the path to your own personal wellness.     Please expect an automated text message or email from us so we can ask a few questions about your health and progress. Based on your answers, a clinician may call you back to offer help and instructions.    Please understand that early in the process of an illness or injury, an emergency department workup can be falsely reassuring.  If you notice any worsening, changing or persistent symptoms please call your family doctor or return to the ER immediately.

## 2024-03-14 NOTE — ED PROVIDER NOTES
constipation and diarrhea K58.2    Schizoaffective disorder, bipolar type ([facility]) F25.0    GI bleed K92.2    Depression with suicidal ideation F32.A, R45.851    Schizoaffective disorder ([facility]) F25.9    MDD (major depressive disorder), recurrent severe, without psychosis ([facility]) F33.2    Severe episode of recurrent major depressive disorder, without psychotic features ([facility]) F33.2    Diabetes mellitus type 2 in obese ([facility]) E11.69, E66.9    Mixed hyperlipidemia E78.2    Cocaine abuse ([facility]) F14.10    Acute psychosis ([facility]) F23    PUD (peptic ulcer disease) K27.9    Gastroesophageal reflux disease without esophagitis K21.9    Prediabetes R73.03    Acute respiratory failure with hypoxia ([facility]) J96.01    Rhabdomyolysis M62.82    Acute encephalopathy G93.40    PHI (acute kidney injury) ([facility]) N17.9    High anion gap metabolic acidosis E87.29         DISPOSITION/PLAN   DISPOSITION Decision To Discharge 03/13/2024 10:34:20 PM      PATIENT REFERRED TO:   [facility] ED  [facility] [address]  [phone]  Go to   New or worsening symptoms    call to establish care with primary care doctor  [phone]  Call         DISCHARGE MEDICATIONS:     Discharge Medication List as of 3/13/2024 10:36 PM              (Please note that portions of this note were completed with a voice recognition program.  Efforts were made to edit the dictations but occasionally words are mis-transcribed.)    MASSIEL Atkinson CNP, Audrey, APRN - CNP  03/13/24 1825

## 2024-03-22 ENCOUNTER — HOSPITAL ENCOUNTER (EMERGENCY)
Age: 34
Discharge: HOME | End: 2024-03-22
Attending: STUDENT IN AN ORGANIZED HEALTH CARE EDUCATION/TRAINING PROGRAM
Payer: MEDICARE

## 2024-03-22 VITALS
OXYGEN SATURATION: 99 % | SYSTOLIC BLOOD PRESSURE: 132 MMHG | RESPIRATION RATE: 14 BRPM | DIASTOLIC BLOOD PRESSURE: 84 MMHG | HEART RATE: 104 BPM | TEMPERATURE: 97.9 F

## 2024-03-22 DIAGNOSIS — Z76.0 ENCOUNTER FOR MEDICATION REFILL: Primary | ICD-10-CM

## 2024-03-22 PROCEDURE — 6370000000 HC RX 637 (ALT 250 FOR IP): Performed by: STUDENT IN AN ORGANIZED HEALTH CARE EDUCATION/TRAINING PROGRAM

## 2024-03-22 PROCEDURE — 99283 EMERGENCY DEPT VISIT LOW MDM: CPT

## 2024-03-22 RX ORDER — CYCLOBENZAPRINE HCL 5 MG
5 TABLET ORAL 2 TIMES DAILY PRN
Qty: 6 TABLET | Refills: 0 | Status: SHIPPED | OUTPATIENT
Start: 2024-03-22 | End: 2024-03-23

## 2024-03-22 RX ORDER — IBUPROFEN 600 MG/1
600 TABLET ORAL EVERY 6 HOURS PRN
Qty: 30 TABLET | Refills: 0 | Status: SHIPPED | OUTPATIENT
Start: 2024-03-22

## 2024-03-22 RX ORDER — CYCLOBENZAPRINE HCL 5 MG
5 TABLET ORAL ONCE
Status: COMPLETED | OUTPATIENT
Start: 2024-03-22 | End: 2024-03-22

## 2024-03-22 RX ADMIN — CYCLOBENZAPRINE HYDROCHLORIDE 5 MG: 5 TABLET, FILM COATED ORAL at 23:00

## 2024-03-23 ENCOUNTER — HOSPITAL ENCOUNTER (EMERGENCY)
Age: 34
Discharge: HOME | End: 2024-03-23
Attending: STUDENT IN AN ORGANIZED HEALTH CARE EDUCATION/TRAINING PROGRAM
Payer: MEDICARE

## 2024-03-23 VITALS
TEMPERATURE: 97.8 F | SYSTOLIC BLOOD PRESSURE: 134 MMHG | HEART RATE: 105 BPM | OXYGEN SATURATION: 97 % | DIASTOLIC BLOOD PRESSURE: 78 MMHG | RESPIRATION RATE: 16 BRPM

## 2024-03-23 DIAGNOSIS — Z76.0 ENCOUNTER FOR MEDICATION REFILL: Primary | ICD-10-CM

## 2024-03-23 PROCEDURE — 99283 EMERGENCY DEPT VISIT LOW MDM: CPT

## 2024-03-23 PROCEDURE — 6370000000 HC RX 637 (ALT 250 FOR IP): Performed by: NURSE PRACTITIONER

## 2024-03-23 RX ORDER — CYCLOBENZAPRINE HCL 10 MG
10 TABLET ORAL 2 TIMES DAILY PRN
Qty: 4 TABLET | Refills: 0 | Status: SHIPPED | OUTPATIENT
Start: 2024-03-23 | End: 2024-03-25

## 2024-03-23 RX ORDER — ALPRAZOLAM 0.5 MG/1
0.5 TABLET ORAL ONCE
Status: COMPLETED | OUTPATIENT
Start: 2024-03-23 | End: 2024-03-23

## 2024-03-23 RX ADMIN — ALPRAZOLAM 0.5 MG: 0.5 TABLET ORAL at 22:07

## 2024-03-23 NOTE — DISCHARGE INSTRUCTIONS
PLEASE RETURN TO THE EMERGENCY DEPARTMENT IMMEDIATELY for worsening symptoms of increasing pain, shortness of breath, feeling of your heart fluttering or racing, swelling to your feet, unable to lay flat, or if you develop any concerning symptoms such as: high fever not relieved by acetaminophen (Tylenol) and/or ibuprofen (Motrin / Advil), chills, persistent nausea and/or vomiting, loss of consciousness, numbness, weakness or tingling in the arms or legs or change in color of the extremities, changes in mental status, persistent headache, blurry vision, loss of bladder / bowel control, unable to follow up with your physician, or other any other care or concern.

## 2024-03-23 NOTE — ED PROVIDER NOTES
[facility] EMERGENCY DEPARTMENT ENCOUNTER      Pt Name: Jose Kaplan  MRN: [id number]  Birthdate 1990  Date of evaluation: 3/23/24    CHIEF COMPLAINT       Chief Complaint   Patient presents with    Hand Pain    Medication Refill       HISTORY OF PRESENT ILLNESS   Jose Kaplan is a 33 y.o. male who presents with medication refill.  Request prescription for Xanax, Flexeril, ibuprofen.  Well-known to the emergency department.  Multiple ER visits.    PASTMEDICAL HISTORY     Past Medical History:   Diagnosis Date    Anxiety     Arthritis     Asthma     Bipolar I disorder, most recent episode (or current) depressed, unspecified 9/12/2014    Clostridium difficile infection     COPD (chronic obstructive pulmonary disease) ([facility])     Depression     Disease of blood and blood forming organ     Eczema     Fracture, metacarpal     R 4th and 5th    Gastric ulcer     Gastritis 06/13/2018    GERD (gastroesophageal reflux disease)     GI bleed     H. 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[facility])     Substance abuse ([facility])     Hx of opiates, benzos, cocaine, alcohol abuse    Suicidal ideation     Suicide attempt by hanging ([facility])     Tobacco dependence     Ulcerative colitis ([facility])     UTI (urinary tract infection)      Past Problem List  Patient Active Problem List   Diagnosis Code    Opioid abuse ([facility]) F11.10    Noncompliance Z91.199    Rectal bleeding K62.5    Smoker F17.200    Juvenile rheumatoid arthritis ([facility]) M08.00    SRIRAM (obstructive sleep apnea) G47.33    Primary insomnia F51.01    Calculus of bile duct with acute on chronic cholecystitis K80.46    Mild intermittent asthma  appearing in normal state of health.  Linear thought process poor eye contact.  Will give short Flexeril course.    Based on the low acuity of concerning symptoms and improvement of symptoms, patient will be discharged with follow up and prescription information listed in the Disposition section.  Patient states they will follow-up with primary care physician and/or return to the emergency department should they experience a change or worsening of symptoms.  Patient will be discharged with resources: summary of visit, instructions, follow-up information, prescriptions if necessary.  Patient/ family instructed to read discharge paperwork. All of their questions and concerns were addressed.   Suspicion for any acute life-threatening processes is low.   Patient voices understanding of plan.      DATA FOR LAB AND RADIOLOGY TESTS ORDERED BELOW ARE REVIEWED BY THE ED CLINICIAN:    RADIOLOGY: All x-rays, CT, MRI, and formal ultrasound images (except ED bedside ultrasound) are read by the radiologist, see reports below, unless otherwise noted in MDM or here.  Reports below are reviewed by myself.  No orders to display       LABS: Lab orders shown below, the results are reviewed by myself, and all abnormals are listed below.  Labs Reviewed - No data to display    Vitals Reviewed:    Vitals:    03/22/24 2227   BP: 132/84   Pulse: (!) 104   Resp: 14   Temp: 97.9 °F (36.6 °C)   SpO2: 99%     MEDICATIONS GIVEN TO PATIENT THIS ENCOUNTER:  Orders Placed This Encounter   Medications    cyclobenzaprine (FLEXERIL) tablet 5 mg    ibuprofen (ADVIL;MOTRIN) 600 MG tablet     Sig: Take 1 tablet by mouth every 6 hours as needed for Pain     Dispense:  30 tablet     Refill:  0    cyclobenzaprine (FLEXERIL) 5 MG tablet     Sig: Take 1 tablet by mouth 2 times daily as needed for Muscle spasms     Dispense:  6 tablet     Refill:  0     DISCHARGE PRESCRIPTIONS:  Discharge Medication List as of 3/22/2024 10:47 PM        START taking these

## 2024-03-24 NOTE — DISCHARGE INSTRUCTIONS
Take medications as prescribed.  Flexeril can cause drowsiness.  Follow-up with primary care provider office for further medication refills.  Return to emergency department as needed.

## 2024-03-24 NOTE — ED PROVIDER NOTES
[facility]  eMERGENCY dEPARTMENT eNCOUnter      Pt Name: Jose Kaplan  MRN: [id number]  Birthdate 1990  Date of evaluation: 3/23/2024  Provider: MASSIEL Arteaga CNP    CHIEF COMPLAINT       Chief Complaint   Patient presents with    Medication Refill         HISTORY OF PRESENT ILLNESS  (Location/Symptom, Timing/Onset, Context/Setting, Quality, Duration, Modifying Factors, Severity.)   Jose Kaplan is a 33 y.o. male who presents to the emergency department for medication refill.  Patient requesting refill on his Flexeril and alprazolam.  Patient has had numerous ER visits.  He was evaluated here yesterday and was written for Flexeril 5 mg but states he needs 10 mg tablets.  Patient denies other complaints.  Patient requesting something to eat upon arrival.  He is homeless.      Nursing Notes were reviewed.    ALLERGIES     Dicyclomine, Famotidine, Geodon [ziprasidone hcl], Haloperidol, Iv dye [iodides], Reglan [metoclopramide], Ibuprofen, Aspirin, Dicyclomine hcl, Hydroxyzine hcl, Iodine, Metronidazole, Mirtazapine, Ondansetron hcl, Promethazine, and Ziprasidone    CURRENT MEDICATIONS       Previous Medications    ACETAMINOPHEN (TYLENOL) 500 MG TABLET    Take 2 tablets by mouth 3 times daily as needed for Pain    BENZOCAINE-MENTHOL (CEPACOL) 6-10 MG LOZG LOZENGE    Take 1 lozenge by mouth every 2 hours as needed for Sore Throat    CARBAMAZEPINE (TEGRETOL) 200 MG TABLET        CLONIDINE (CATAPRES) 0.1 MG TABLET        CLOTRIMAZOLE-BETAMETHASONE (LOTRISONE) 1-0.05 % CREAM    Apply topically 2 times daily to feet.    DICLOFENAC SODIUM (VOLTAREN) 1 % GEL    Apply 4 g topically 4 times daily for 5 days    GABAPENTIN (NEURONTIN) 300 MG CAPSULE        IBUPROFEN (ADVIL;MOTRIN) 600 MG TABLET    Take 1 tablet by mouth every 6 hours as needed for Pain    METFORMIN (GLUCOPHAGE) 500 MG TABLET    Take 1 tablet by mouth 2 times daily (with meals) Indications: last dispensed 1/6/23    NALOXONE 4

## 2024-03-26 ENCOUNTER — HOSPITAL ENCOUNTER (EMERGENCY)
Age: 34
Discharge: HOME | End: 2024-03-26
Attending: EMERGENCY MEDICINE
Payer: MEDICARE

## 2024-03-26 VITALS
SYSTOLIC BLOOD PRESSURE: 147 MMHG | OXYGEN SATURATION: 95 % | HEART RATE: 89 BPM | WEIGHT: 180 LBS | HEIGHT: 67 IN | DIASTOLIC BLOOD PRESSURE: 102 MMHG | BODY MASS INDEX: 28.25 KG/M2 | RESPIRATION RATE: 20 BRPM

## 2024-03-26 DIAGNOSIS — F41.9 ANXIETY: Primary | ICD-10-CM

## 2024-03-26 PROCEDURE — 6370000000 HC RX 637 (ALT 250 FOR IP): Performed by: NURSE PRACTITIONER

## 2024-03-26 PROCEDURE — 99283 EMERGENCY DEPT VISIT LOW MDM: CPT

## 2024-03-26 RX ORDER — ALPRAZOLAM 0.5 MG/1
0.5 TABLET ORAL ONCE
Status: COMPLETED | OUTPATIENT
Start: 2024-03-26 | End: 2024-03-26

## 2024-03-26 RX ADMIN — ALPRAZOLAM 0.5 MG: 0.5 TABLET ORAL at 20:02

## 2024-03-26 ASSESSMENT — PAIN - FUNCTIONAL ASSESSMENT: PAIN_FUNCTIONAL_ASSESSMENT: NONE - DENIES PAIN

## 2024-03-26 NOTE — ED PROVIDER NOTES
disease (HCC)     Substance abuse (HCC)     Hx of opiates, benzos, cocaine, alcohol abuse    Suicidal ideation     Suicide attempt by hanging (HCC)     Tobacco dependence     Ulcerative colitis (HCC)     UTI (urinary tract infection)      SURGICAL HISTORY       Past Surgical History:   Procedure Laterality Date    ABDOMEN SURGERY      upper GI scope 7/7/2015    BRONCHOSCOPY      CHOLECYSTECTOMY, LAPAROSCOPIC  07/14/2017    surgery performed at [facility]    COLONOSCOPY      ENDOSCOPY, COLON, DIAGNOSTIC      OTHER SURGICAL HISTORY      lumbar puncture    MA COLONOSCOPY W/BIOPSY SINGLE/MULTIPLE  8/9/2017    COLONOSCOPY WITH BIOPSY performed by Priyanka Mojica MD at [facility]    MA EGD TRANSORAL BIOPSY SINGLE/MULTIPLE N/A 3/20/2017    EGD BIOPSY performed by Jacobo Villalobos MD at [facility]    MA ESOPHAGOGASTRODUODENOSCOPY TRANSORAL DIAGNOSTIC N/A 8/9/2017    EGD ESOPHAGOGASTRODUODENOSCOPY performed by Priyanka Mojica MD at [facility]    SIGMOIDOSCOPY N/A 3/20/2017    SIGMOIDOSCOPY DIAGNOSTIC FLEXIBLE performed by Jacobo Villalobos MD at [facility]    UPPER GASTROINTESTINAL ENDOSCOPY  2/4/16    UPPER GASTROINTESTINAL ENDOSCOPY N/A 6/13/2018    GASTRITIS    UPPER GASTROINTESTINAL ENDOSCOPY N/A 9/20/2018    EGD BIOPSY performed by Marianne Brooke MD at [facility] OR     CURRENT MEDICATIONS       Previous Medications    ACETAMINOPHEN (TYLENOL) 500 MG TABLET    Take 2 tablets by mouth 3 times daily as needed for Pain    BENZOCAINE-MENTHOL (CEPACOL) 6-10 MG LOZG LOZENGE    Take 1 lozenge by mouth every 2 hours as needed for Sore Throat    CARBAMAZEPINE (TEGRETOL) 200 MG TABLET        CLONIDINE (CATAPRES) 0.1 MG TABLET        CLOTRIMAZOLE-BETAMETHASONE (LOTRISONE) 1-0.05 % CREAM    Apply topically 2 times daily to feet.    DICLOFENAC SODIUM (VOLTAREN) 1 % GEL    Apply 4 g topically 4 times daily for 5 days    GABAPENTIN (NEURONTIN) 300 MG CAPSULE        IBUPROFEN (ADVIL;MOTRIN) 600 
with a nontoxic appearance and was seen in conjunction with Dr. Goldberger  .     DDx include anxiety, medication refill, malingering      The patient was involved in his/her plan of care through shared decision making. The testing that was ordered was discussed with the patient. Any medications that may have been ordered were discussed with the patient.       I have reviewed the patient's previous medical records using the electronic health record that we have available.      Patient has multiple ER visits for request of refills of his alprazolam and muscle relaxants.  Patient requesting refill of his alprazolam today.  I discussed with patient I cannot refill his alprazolam but he was given a 1 tablet in the ED.  Provided with information for internal medicine clinic follow-up for further medication refills    Evaluation and treatment course in the ED, and plan of care upon discharge was discussed in length with the patient. Patient had no further questions prior to being discharged and was instructed to return to the ED for new or worsening symptoms.        CONSULTS:  None    PROCEDURES:  Procedures    FINAL IMPRESSION      1. Anxiety            Problem List  Patient Active Problem List   Diagnosis Code    Opioid abuse ([facility]) F11.10    Noncompliance Z91.199    Rectal bleeding K62.5    Smoker F17.200    Juvenile rheumatoid arthritis ([facility]) M08.00    SRIRAM (obstructive sleep apnea) G47.33    Primary insomnia F51.01    Calculus of bile duct with acute on chronic cholecystitis K80.46    Mild intermittent asthma without complication J45.20    Gastritis K29.70    Generalized abdominal pain R10.84    Anemia D64.9    Elevated liver enzymes R74.8    Nausea and vomiting R11.2    Opioid type dependence, continuous ([facility]) F11.20    Abdominal pain R10.9    Diarrhea R19.7    Irritable bowel syndrome with both constipation and diarrhea K58.2    Schizoaffective disorder, bipolar type ([facility]) F25.0    GI bleed K92.2    Depression with

## 2024-03-26 NOTE — DISCHARGE INSTRUCTIONS
Please follow-up with primary care provider clinic given.  Return to emergency department for worsening or symptoms

## 2024-03-27 ENCOUNTER — HOSPITAL ENCOUNTER (EMERGENCY)
Age: 34
Discharge: HOME | End: 2024-03-28
Payer: MEDICARE

## 2024-03-27 ENCOUNTER — HOSPITAL ENCOUNTER (EMERGENCY)
Age: 34
Discharge: HOME | End: 2024-03-27
Payer: MEDICARE

## 2024-03-27 VITALS
TEMPERATURE: 98.1 F | DIASTOLIC BLOOD PRESSURE: 104 MMHG | BODY MASS INDEX: 28.25 KG/M2 | HEIGHT: 67 IN | WEIGHT: 180 LBS | SYSTOLIC BLOOD PRESSURE: 167 MMHG | RESPIRATION RATE: 18 BRPM | OXYGEN SATURATION: 97 % | HEART RATE: 96 BPM

## 2024-03-27 VITALS
HEIGHT: 67 IN | HEART RATE: 98 BPM | RESPIRATION RATE: 18 BRPM | TEMPERATURE: 97.8 F | DIASTOLIC BLOOD PRESSURE: 84 MMHG | OXYGEN SATURATION: 98 % | BODY MASS INDEX: 28.25 KG/M2 | WEIGHT: 180 LBS | SYSTOLIC BLOOD PRESSURE: 154 MMHG

## 2024-03-27 DIAGNOSIS — Z76.5 DRUG-SEEKING BEHAVIOR: ICD-10-CM

## 2024-03-27 DIAGNOSIS — Z76.5 MALINGERING: ICD-10-CM

## 2024-03-27 DIAGNOSIS — Z87.898 HISTORY OF SUBSTANCE USE: Primary | ICD-10-CM

## 2024-03-27 DIAGNOSIS — M21.619 BUNION: Primary | ICD-10-CM

## 2024-03-27 DIAGNOSIS — Z76.0 ENCOUNTER FOR MEDICATION REFILL: ICD-10-CM

## 2024-03-27 PROCEDURE — 99282 EMERGENCY DEPT VISIT SF MDM: CPT

## 2024-03-27 ASSESSMENT — PAIN - FUNCTIONAL ASSESSMENT
PAIN_FUNCTIONAL_ASSESSMENT: NONE - DENIES PAIN
PAIN_FUNCTIONAL_ASSESSMENT: NONE - DENIES PAIN

## 2024-03-27 NOTE — ED NOTES
Pt arrived to ED with c/o mediction refill. Pt states he ran out of his prescription a week ago. Pt requesting a dose of alprazolam. Pt states he is feel anxious. Pt is A&Ox4.

## 2024-03-28 ENCOUNTER — HOSPITAL ENCOUNTER (EMERGENCY)
Age: 34
Discharge: HOME | End: 2024-03-28
Attending: STUDENT IN AN ORGANIZED HEALTH CARE EDUCATION/TRAINING PROGRAM
Payer: MEDICARE

## 2024-03-28 VITALS
RESPIRATION RATE: 18 BRPM | HEIGHT: 67 IN | TEMPERATURE: 98 F | HEART RATE: 67 BPM | SYSTOLIC BLOOD PRESSURE: 132 MMHG | OXYGEN SATURATION: 95 % | BODY MASS INDEX: 28.25 KG/M2 | DIASTOLIC BLOOD PRESSURE: 68 MMHG | WEIGHT: 180 LBS

## 2024-03-28 DIAGNOSIS — M21.612 BUNION OF GREAT TOE OF LEFT FOOT: Primary | ICD-10-CM

## 2024-03-28 DIAGNOSIS — Z76.0 ENCOUNTER FOR MEDICATION REFILL: ICD-10-CM

## 2024-03-28 PROCEDURE — 6370000000 HC RX 637 (ALT 250 FOR IP): Performed by: NURSE PRACTITIONER

## 2024-03-28 PROCEDURE — 99283 EMERGENCY DEPT VISIT LOW MDM: CPT

## 2024-03-28 RX ORDER — CYCLOBENZAPRINE HCL 5 MG
5 TABLET ORAL ONCE
Status: COMPLETED | OUTPATIENT
Start: 2024-03-28 | End: 2024-03-28

## 2024-03-28 RX ORDER — CYCLOBENZAPRINE HCL 5 MG
5 TABLET ORAL 2 TIMES DAILY PRN
Qty: 3 TABLET | Refills: 0 | Status: SHIPPED | OUTPATIENT
Start: 2024-03-28 | End: 2024-03-31 | Stop reason: ALTCHOICE

## 2024-03-28 RX ORDER — ALPRAZOLAM 0.5 MG/1
0.5 TABLET ORAL ONCE
Status: COMPLETED | OUTPATIENT
Start: 2024-03-28 | End: 2024-03-28

## 2024-03-28 RX ADMIN — CYCLOBENZAPRINE HYDROCHLORIDE 5 MG: 5 TABLET, FILM COATED ORAL at 23:12

## 2024-03-28 RX ADMIN — ALPRAZOLAM 0.5 MG: 0.5 TABLET ORAL at 23:12

## 2024-03-28 ASSESSMENT — PAIN - FUNCTIONAL ASSESSMENT: PAIN_FUNCTIONAL_ASSESSMENT: 0-10
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
## 2024-03-28 NOTE — ED TRIAGE NOTES
Patient presents to ED with C/O medication refill. Patient states he needs \"more anxiety meds, Luz Elena been walking around a lot of trees and stuff.\" Patient refusing to sit at this time and mumbling to self and eating snacks. VS stable call light in reach.

## 2024-03-28 NOTE — ED PROVIDER NOTES
consulting clinician/attending physician:  None/None         Shared Decision-Making was performed and disposition discussed with the       Patient/Family and questions answered          Social determinants of health impacting treatment or disposition:     4) MIPS  N/A                    Medical Decision Making  Patient presented to the ER requesting or actually insisting that I put him with a paper prescription for Xanax.  I reviewed the patient's chart extensively including previous visits to ERs as well as his PDMP.  Patient's last prescription was the beginning of March.  He has been to the ER multiple times and been given single doses there but no one has given him a prescription.  He has been visiting the ER almost daily.  I advised the patient that a prescription for controlled substance needed to come from his primary care doctor and that I would not be providing him with the medication.  I have no concern for withdrawal as the patient has not been prescribed enough to take the medication daily.  He was only given a 7-day supply at the beginning of March.  Patient is discharged with referrals for resources.      Vitals Reviewed:    Vitals:    03/27/24 2018   BP: (!) 154/84   Pulse: 98   Resp: 18   Temp: 97.8 °F (36.6 °C)   TempSrc: Oral   SpO2: 98%   Weight: 81.6 kg (180 lb)   Height: 1.702 m (5' 7\")       The patient was seen and examined. Appropriate diagnostic testing was performed and results reviewed with the patient.         The results of pertinent diagnostic studies and exam findings were discussed. The patient’s provisional diagnosis and plan of care were discussed with the patient and present family who expressed understanding and agreement with the POC. Any medications were reviewed and indications and risks of medications were discussed with the patient /family present. Strict verbal and written return precautions, instructions and appropriate follow-up provided to  the patient.   Patient was  Vida, MASSIEL - Joanne Araujo, MASSIEL - SO  03/28/24 0559

## 2024-03-28 NOTE — ED PROVIDER NOTES
[facility] EMERGENCY DEPT      EMERGENCY MEDICINE     Pt Name: Jose Kaplan  MRN: [id number]  Birthdate 1990  Date of evaluation: 3/27/2024  Provider: MASSIEL Carpenter CNP    CHIEF COMPLAINT       Chief Complaint   Patient presents with    Foot Pain     HISTORY OF PRESENT ILLNESS   Jose Kaplan is a pleasant 33 y.o. male who presents to the emergency department from home with c/o wanting me to check out a sore on his foot.  Patient states has had it checked many times but he just wants to know what I would recommend doing for it.  Patient states he walks a lot because he is homeless.      History is obtained from:  patient  PASTMEDICAL HISTORY     Past Medical History:   Diagnosis Date    Anxiety     Arthritis     Asthma     Bipolar I disorder, most recent episode (or current) depressed, unspecified 9/12/2014    Clostridium difficile infection     COPD (chronic obstructive pulmonary disease) (HCC)     Depression     Disease of blood and blood forming organ     Eczema     Fracture, metacarpal     R 4th and 5th    Gastric ulcer     Gastritis 06/13/2018    GERD (gastroesophageal reflux disease)     GI bleed     H. 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

## 2024-03-29 ENCOUNTER — HOSPITAL ENCOUNTER (EMERGENCY)
Age: 34
Discharge: LEFT BEFORE DISCHARGE | End: 2024-03-29
Attending: EMERGENCY MEDICINE

## 2024-03-29 VITALS — RESPIRATION RATE: 18 BRPM | HEART RATE: 99 BPM | OXYGEN SATURATION: 100 %

## 2024-03-29 DIAGNOSIS — Z53.21 PATIENT LEFT BEFORE EVALUATION BY PHYSICIAN: Primary | ICD-10-CM

## 2024-03-29 DIAGNOSIS — Z76.5 DRUG-SEEKING BEHAVIOR: ICD-10-CM

## 2024-03-29 NOTE — DISCHARGE INSTRUCTIONS
Take medications as prescribed.  Follow-up podiatrist provided.  Return to emergency department for worsening or new symptoms.

## 2024-03-29 NOTE — ED PROVIDER NOTES
[facility] ED  Emergency Department Encounter  Emergency Medicine Resident     Pt Name:Jose Kaplan  MRN: [id number]  Birthdate 1990  Date of evaluation: 3/29/24  PCP:  No primary care provider on file.  Note Started: 2:43 AM EDT      CHIEF COMPLAINT       Chief Complaint   Patient presents with    Medication Refill       HISTORY OF PRESENT ILLNESS  (Location/Symptom, Timing/Onset, Context/Setting, Quality, Duration, Modifying Factors, Severity.)      Jose Kaplan is a 33 y.o. male who presents requesting medication refill.  Patient is requesting Xanax and muscle relaxant.  On chart review it appears patient frequents emergency departments requesting the same, does not seem to follow with any long-term provider.  Review of PDMP with short prescription of these, no long-term prescriptions.  Patient refusing to take off bulky clothing for blood pressure reading, temperature taking, discharge or physical exam.  Despite being offered multiple times patient is refusing medical screening examination.    PAST MEDICAL / SURGICAL / SOCIAL / FAMILY HISTORY      has a past medical history of Anxiety, Arthritis, Asthma, Bipolar I disorder, most recent episode (or current) depressed, unspecified, Clostridium difficile infection, COPD (chronic obstructive pulmonary disease) ([facility]), Depression, Disease of blood and blood forming organ, Eczema, Fracture, metacarpal, Gastric ulcer, Gastritis, GERD (gastroesophageal reflux disease), GI bleed, H. 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

## 2024-03-29 NOTE — ED PROVIDER NOTES
Pt left before triage,  I offered medical screening exam twice, patient declined,     Clifford Fajardo, DO  03/29/24 0249

## 2024-03-29 NOTE — ED TRIAGE NOTES
Pt presents to ED for medication refill.  Pt refused to take his vital signs properly, refused to take temperature and won't pull up sleeves for blood pressure taking.  Pt refused to get settled in the room.

## 2024-03-29 NOTE — ED PROVIDER NOTES
MD Galina at [facility]    ME EGD TRANSORAL BIOPSY SINGLE/MULTIPLE N/A 3/20/2017    EGD BIOPSY performed by Jacobo Villalobos MD at [facility]    ME ESOPHAGOGASTRODUODENOSCOPY TRANSORAL DIAGNOSTIC N/A 8/9/2017    EGD ESOPHAGOGASTRODUODENOSCOPY performed by Priyanka Mojica MD at [facility]    SIGMOIDOSCOPY N/A 3/20/2017    SIGMOIDOSCOPY DIAGNOSTIC FLEXIBLE performed by Jacobo Villalobos MD at [facility]    UPPER GASTROINTESTINAL ENDOSCOPY  2/4/16    UPPER GASTROINTESTINAL ENDOSCOPY N/A 6/13/2018    GASTRITIS    UPPER GASTROINTESTINAL ENDOSCOPY N/A 9/20/2018    EGD BIOPSY performed by Marianne Brooke MD at [facility] OR         FAMILY HISTORY           Problem Relation Age of Onset    Diabetes Father     Alcohol Abuse Father     Depression Father     Arthritis Father     High Blood Pressure Father     Other Father         aneurysm & epilepsy    Migraines Father     Arthritis Mother     Other Mother         aneurysm & epilepsy    Migraines Mother     Diabetes Brother         Aunt and uncles    Depression Brother     Mental Illness Brother     Other Brother         epilepsy    Migraines Brother     Stroke Other         Uncle    Other Brother         murdered Oct 6th, 2014    Colon Cancer Paternal Cousin 42    Other Sister         epilepsy    Migraines Sister      Family Status   Relation Name Status    Father  Alive    Mother  Alive    Brother  (Not Specified)    Other  (Not Specified)    Brother  (Not Specified)    PCousin  (Not Specified)    Sister  (Not Specified)        SOCIAL HISTORY      reports that he has been smoking cigarettes. He has a 7.5 pack-year smoking history. He has never used smokeless tobacco. He reports that he does not currently use alcohol. He reports that he does not currently use drugs after having used the following drugs: Cocaine.    REVIEW OF SYSTEMS    (2-9 systems for level 4, 10 or more for level 5)   Review of Systems   Musculoskeletal:  Positive

## 2024-03-30 ENCOUNTER — HOSPITAL ENCOUNTER (EMERGENCY)
Age: 34
Discharge: HOME | End: 2024-03-31
Attending: EMERGENCY MEDICINE
Payer: MEDICARE

## 2024-03-30 DIAGNOSIS — F41.1 ANXIETY STATE: Primary | ICD-10-CM

## 2024-03-30 PROCEDURE — 99283 EMERGENCY DEPT VISIT LOW MDM: CPT

## 2024-03-30 ASSESSMENT — PAIN - FUNCTIONAL ASSESSMENT: PAIN_FUNCTIONAL_ASSESSMENT: 0-10

## 2024-03-31 VITALS
DIASTOLIC BLOOD PRESSURE: 107 MMHG | RESPIRATION RATE: 15 BRPM | HEART RATE: 109 BPM | SYSTOLIC BLOOD PRESSURE: 149 MMHG | OXYGEN SATURATION: 98 %

## 2024-03-31 PROCEDURE — 6370000000 HC RX 637 (ALT 250 FOR IP): Performed by: EMERGENCY MEDICINE

## 2024-03-31 RX ORDER — CYCLOBENZAPRINE HCL 10 MG
10 TABLET ORAL 3 TIMES DAILY PRN
Qty: 21 TABLET | Refills: 0 | Status: SHIPPED | OUTPATIENT
Start: 2024-03-31 | End: 2024-04-10

## 2024-03-31 RX ORDER — CYCLOBENZAPRINE HCL 10 MG
10 TABLET ORAL ONCE
Status: COMPLETED | OUTPATIENT
Start: 2024-03-31 | End: 2024-03-31

## 2024-03-31 RX ORDER — ALPRAZOLAM 0.5 MG/1
0.5 TABLET ORAL
Status: DISCONTINUED | OUTPATIENT
Start: 2024-03-31 | End: 2024-03-31

## 2024-03-31 RX ORDER — ALPRAZOLAM 0.5 MG/1
0.5 TABLET ORAL ONCE
Status: COMPLETED | OUTPATIENT
Start: 2024-03-31 | End: 2024-03-31

## 2024-03-31 RX ORDER — ALPRAZOLAM 0.5 MG/1
0.5 TABLET ORAL NIGHTLY PRN
Qty: 10 TABLET | Refills: 0 | Status: SHIPPED | OUTPATIENT
Start: 2024-03-31 | End: 2024-04-30

## 2024-03-31 RX ADMIN — CYCLOBENZAPRINE 10 MG: 10 TABLET, FILM COATED ORAL at 00:25

## 2024-03-31 RX ADMIN — ALPRAZOLAM 0.5 MG: 0.5 TABLET ORAL at 00:25

## 2024-03-31 ASSESSMENT — PAIN SCALES - GENERAL: PAINLEVEL_OUTOF10: 5

## 2024-03-31 NOTE — ED PROVIDER NOTES
Abdominal:      General: Abdomen is flat.      Palpations: Abdomen is soft.      Tenderness: There is no abdominal tenderness.   Neurological:      Mental Status: He is alert.   Psychiatric:         Mood and Affect: Mood is anxious.         Thought Content: Thought content does not include homicidal or suicidal ideation.         MEDICAL DECISION MAKING / ED COURSE:   Summary of Patient Presentation:      43-year-old presents with complaints of anxiety, plan is discharged with a short course of Flexeril and Ativan, outpatient follow-up with PCP and return if symptoms worsen or change.    Shared Decision Making: The patient was involved in his/her plan of care through shared decision making. The testing that was ordered was discussed with the patient. Any medications that may have been ordered were discussed with the patient    Code Status Discussion:      \"ED Course\" Notes From Epic Narrator:         CRITICAL CARE:       PROCEDURES:    Procedures      DATA FOR LAB AND RADIOLOGY TESTS ORDERED BELOW ARE REVIEWED BY THE ED CLINICIAN:    RADIOLOGY: All x-rays, CT, MRI, and formal ultrasound images (except ED bedside ultrasound) are read by the radiologist, see reports below, unless otherwise noted in MDM or here.  Reports below are reviewed by myself.  No orders to display       LABS: Lab orders shown below, the results are reviewed by myself, and all abnormals are listed below.  Labs Reviewed - No data to display    Vitals Reviewed:    Vitals:    03/30/24 2349 03/31/24 0003   BP:  (!) 149/107   Pulse:  (!) 109   Resp: 15    SpO2:  98%     MEDICATIONS GIVEN TO PATIENT THIS ENCOUNTER:  Orders Placed This Encounter   Medications    ALPRAZolam (XANAX) tablet 0.5 mg    ALPRAZolam (XANAX) 0.5 MG tablet     Sig: Take 1 tablet by mouth nightly as needed for Sleep for up to 30 days. Max Daily Amount: 0.5 mg     Dispense:  10 tablet     Refill:  0    cyclobenzaprine (FLEXERIL) 10 MG tablet     Sig: Take 1 tablet by mouth 3

## 2024-04-08 ENCOUNTER — HOSPITAL ENCOUNTER (EMERGENCY)
Age: 34
Discharge: HOME | End: 2024-04-09
Attending: EMERGENCY MEDICINE
Payer: MEDICARE

## 2024-04-08 VITALS
DIASTOLIC BLOOD PRESSURE: 88 MMHG | RESPIRATION RATE: 18 BRPM | HEART RATE: 89 BPM | WEIGHT: 180 LBS | BODY MASS INDEX: 28.25 KG/M2 | SYSTOLIC BLOOD PRESSURE: 116 MMHG | TEMPERATURE: 98.2 F | OXYGEN SATURATION: 98 % | HEIGHT: 67 IN

## 2024-04-08 DIAGNOSIS — Z76.0 ENCOUNTER FOR MEDICATION REFILL: Primary | ICD-10-CM

## 2024-04-08 PROCEDURE — 99283 EMERGENCY DEPT VISIT LOW MDM: CPT

## 2024-04-08 RX ORDER — ALPRAZOLAM 0.5 MG/1
0.5 TABLET ORAL ONCE
Status: COMPLETED | OUTPATIENT
Start: 2024-04-08 | End: 2024-04-09

## 2024-04-08 RX ORDER — ALPRAZOLAM 0.5 MG/1
0.5 TABLET ORAL 2 TIMES DAILY PRN
Qty: 14 TABLET | Refills: 0 | Status: SHIPPED | OUTPATIENT
Start: 2024-04-08 | End: 2024-04-15

## 2024-04-08 RX ORDER — QUETIAPINE FUMARATE 100 MG/1
100 TABLET, FILM COATED ORAL ONCE
Status: COMPLETED | OUTPATIENT
Start: 2024-04-08 | End: 2024-04-09

## 2024-04-08 RX ORDER — CYCLOBENZAPRINE HCL 10 MG
10 TABLET ORAL 3 TIMES DAILY PRN
Qty: 21 TABLET | Refills: 0 | Status: SHIPPED | OUTPATIENT
Start: 2024-04-08 | End: 2024-04-18

## 2024-04-08 RX ORDER — QUETIAPINE FUMARATE 100 MG/1
100 TABLET, FILM COATED ORAL NIGHTLY
Qty: 14 TABLET | Refills: 0 | Status: SHIPPED | OUTPATIENT
Start: 2024-04-08

## 2024-04-08 RX ORDER — CYCLOBENZAPRINE HCL 10 MG
10 TABLET ORAL ONCE
Status: COMPLETED | OUTPATIENT
Start: 2024-04-08 | End: 2024-04-09

## 2024-04-08 ASSESSMENT — PAIN - FUNCTIONAL ASSESSMENT: PAIN_FUNCTIONAL_ASSESSMENT: 0-10

## 2024-04-09 PROCEDURE — 6370000000 HC RX 637 (ALT 250 FOR IP): Performed by: EMERGENCY MEDICINE

## 2024-04-09 RX ADMIN — ALPRAZOLAM 0.5 MG: 0.5 TABLET ORAL at 00:23

## 2024-04-09 RX ADMIN — CYCLOBENZAPRINE 10 MG: 10 TABLET, FILM COATED ORAL at 00:23

## 2024-04-09 RX ADMIN — QUETIAPINE FUMARATE 100 MG: 100 TABLET ORAL at 00:23

## 2024-04-09 ASSESSMENT — PAIN DESCRIPTION - ORIENTATION: ORIENTATION: RIGHT;LEFT

## 2024-04-09 ASSESSMENT — PAIN DESCRIPTION - DESCRIPTORS: DESCRIPTORS: DISCOMFORT

## 2024-04-09 ASSESSMENT — PAIN SCALES - GENERAL: PAINLEVEL_OUTOF10: 7

## 2024-04-09 ASSESSMENT — PAIN DESCRIPTION - LOCATION: LOCATION: HAND

## 2024-04-09 NOTE — ED PROVIDER NOTES
FL COLONOSCOPY W/BIOPSY SINGLE/MULTIPLE  8/9/2017    COLONOSCOPY WITH BIOPSY performed by Priyanka Mojica MD at [facility]    FL EGD TRANSORAL BIOPSY SINGLE/MULTIPLE N/A 3/20/2017    EGD BIOPSY performed by Jacobo Villalobos MD at [facility]    FL ESOPHAGOGASTRODUODENOSCOPY TRANSORAL DIAGNOSTIC N/A 8/9/2017    EGD ESOPHAGOGASTRODUODENOSCOPY performed by Priyanka Mojica MD at [facility]    SIGMOIDOSCOPY N/A 3/20/2017    SIGMOIDOSCOPY DIAGNOSTIC FLEXIBLE performed by Jacobo Villalobos MD at [facility]    UPPER GASTROINTESTINAL ENDOSCOPY  2/4/16    UPPER GASTROINTESTINAL ENDOSCOPY N/A 6/13/2018    GASTRITIS    UPPER GASTROINTESTINAL ENDOSCOPY N/A 9/20/2018    EGD BIOPSY performed by Marianne Brooke MD at [facility] OR     CURRENT MEDICATIONS       Discharge Medication List as of 4/8/2024 11:21 PM        CONTINUE these medications which have NOT CHANGED    Details   ibuprofen (ADVIL;MOTRIN) 600 MG tablet Take 1 tablet by mouth every 6 hours as needed for Pain, Disp-30 tablet, R-0Print      acetaminophen (TYLENOL) 500 MG tablet Take 2 tablets by mouth 3 times daily as needed for Pain, Disp-180 tablet, R-0Print      clotrimazole-betamethasone (LOTRISONE) 1-0.05 % cream Apply topically 2 times daily to feet., Disp-45 g, R-0, Print      ondansetron (ZOFRAN-ODT) 4 MG disintegrating tablet Take 1 tablet by mouth 3 times daily as needed for Nausea or Vomiting, Disp-21 tablet, R-0Normal      diclofenac sodium (VOLTAREN) 1 % GEL Apply 4 g topically 4 times daily for 5 days, Topical, 4 TIMES DAILY Starting Sat 1/27/2024, Until Thu 2/1/2024, For 5 days, Disp-50 g, R-0, Normal      ondansetron (ZOFRAN) 4 MG tablet Take 1 tablet by mouth every 8 hours as needed for Nausea or Vomiting, Disp-12 tablet, R-0Print      metFORMIN (GLUCOPHAGE) 500 MG tablet Take 1 tablet by mouth 2 times daily (with meals) Indications: last dispensed 1/6/23, Disp-60 tablet, R-0Print      carBAMazepine

## 2024-04-17 ENCOUNTER — HOSPITAL ENCOUNTER (EMERGENCY)
Age: 34
Discharge: LEFT BEFORE DISCHARGE | End: 2024-04-17
Payer: MEDICARE

## 2024-04-17 VITALS
DIASTOLIC BLOOD PRESSURE: 100 MMHG | TEMPERATURE: 98.2 F | OXYGEN SATURATION: 95 % | SYSTOLIC BLOOD PRESSURE: 152 MMHG | HEART RATE: 120 BPM | BODY MASS INDEX: 31.39 KG/M2 | WEIGHT: 200 LBS | RESPIRATION RATE: 16 BRPM | HEIGHT: 67 IN

## 2024-04-17 DIAGNOSIS — Z76.0 ENCOUNTER FOR MEDICATION REFILL: ICD-10-CM

## 2024-04-17 DIAGNOSIS — Z00.8 EVALUATION BY PSYCHIATRIC SERVICE REQUIRED: Primary | ICD-10-CM

## 2024-04-17 PROCEDURE — 99213 OFFICE O/P EST LOW 20 MIN: CPT

## 2024-04-17 PROCEDURE — 99214 OFFICE O/P EST MOD 30 MIN: CPT

## 2024-04-17 NOTE — ED TRIAGE NOTES
Patient comes in for concerns of \"energy eating through my feet\". Patient believes he has holes in his feet but no injury is noted. Patient unable to hold conversation without getting off track. Patient states he has not had his psychiatric medications. Patient believes his \"bones are shifted\" in his right leg, no injury noted. Patient hypertensive and tachycardic, denies any recent drug use. Patient denies any suicidal/homicidal thoughts at this time

## 2024-04-17 NOTE — ED PROVIDER NOTES
[facility] URGENT CARE  Urgent Care Encounter       CHIEF COMPLAINT       Chief Complaint   Patient presents with    Psychiatric Evaluation    Foot Pain     Right        Nurses Notes reviewed and I agree except as noted in the HPI.  HISTORY OF PRESENT ILLNESS   Jose Kaplan is a 33 y.o. male who presents with concerns of right foot pain. Patient states the \"spiritual energies are just going through his feet and burning it\". Patient points to right tibia and states that his femur is broken. Patient then reports he came from [employer], got on a \"grey hound\" and reports was dropped off by [facility] Police from the ER for evaluation. Patient reports the nurse at ER said urgent care would be a \"better fit\". Patient then states \"the trees are beautiful right now because of the sunshine and rain\". Patient states \"peace is a real thing, I need a refill on my anxiety meds too please\".     Patient has been seen recently multiple times, almost daily for the past month in the ER for drug seeking behavior, anxiety, and medication refill for seroquel zanax and flexeril.   Patient has an extensive mental health history as well as drug and opioid use.      HPI    REVIEW OF SYSTEMS     Review of Systems   Psychiatric/Behavioral:  Positive for confusion, decreased concentration, dysphoric mood and hallucinations. Negative for agitation, self-injury, sleep disturbance and suicidal ideas. The patient is nervous/anxious. The patient is not hyperactive.    All other systems reviewed and are negative.      PAST MEDICAL HISTORY         Diagnosis Date    Anxiety     Arthritis     Asthma     Bipolar I disorder, most recent episode (or current) depressed, unspecified 9/12/2014    Clostridium difficile infection     COPD (chronic obstructive pulmonary disease) ([facility])     Depression     Disease of blood and blood forming organ     Eczema     Fracture, metacarpal     R 4th and 5th    Gastric ulcer     Gastritis 06/13/2018           DISPOSITION/ PLAN     Patient seen and evaluated for the above. After discussion with patient, further psychiatric evaluation is recommended. [facility]'s [place] policy called for assistance by Bianca RUTH, was advised to call PACP. The patient has decided to leave against transfer and leave against medical advice, because he declines transfer to [facility] ER, declines transfer with [place] Policy and [facility] .He has an altered mental status, no ST or HI thought. The patient refuses hospital transfer via [place] Policy or [facility] for reevaluation and possible admission and wants to be discharged but then states he will walk to [facility].The risks have been explained to the patient, including worsening illness, chronic pain, permanent disability, and death. The benefits of transfer and reevaluation for possible admission have also been explained, including the availability and proximity of nurses, physicians, monitoring, diagnostic testing, and treatment.The patient was able to understand and state the risks and benefits of hospital admission.  This was witnessed by nurse Antoinette RUTH and me. He had the opportunity to ask questions about his medical condition.The patient was treated to the extent that he would allow, and knows that he may return for care at any time.      PATIENT REFERRED TO:  No primary care provider on file.  No primary physician on file.      DISCHARGE MEDICATIONS:  New Prescriptions    No medications on file       Discontinued Medications    No medications on file       Current Discharge Medication List          MASSIEL Fernando CNP    (Please note that portions of this note were completed with a voice recognition program. Efforts were made to edit the dictations but occasionally words are mis-transcribed.)            Annalisa Painting APRN - CNP  04/17/24 1927

## 2024-04-17 NOTE — ED NOTES
After speaking with KOBE Painting CNP, this RN called [place] police who brought the patient to [facility] from [facility] ER to take patient back to ER for further evaluation. [place] police stated \"people walk on the streets like this all the time\". [place] police notified that KOBE Painting CNP wanted patient brought back to ER for evaluation. [facility] was then called but patient began to refuse to ride in ambulance so patient decided to leave A. Patient states he would walk to [facility] ER. This RN watched patient walk to [facility] ER to [facility] police for his safety.      Sadaf Newton, RN  04/17/24 0497

## 2024-04-18 ENCOUNTER — SOCIAL WORK (OUTPATIENT)
Dept: INTERNAL MEDICINE CLINIC | Age: 34
End: 2024-04-18

## 2024-04-18 NOTE — PROGRESS NOTES
Patient presented to the office on 4/17, requesting to re-establish with Sigrid Espinal CNP for primary care. Wil informed patient that provider is not in office on Wednesdays and sw will speak to her when she returned to office on Thursday.   Patient contacted office prior to wil being able to speak to Sigrid. Wil staffed case with Sigrid, who has a full schedule and is not taking new primary care patients at this time.  Patient presented to office, wil informed patient that at this time due to Sigrid's schedule she is  not taking new primary care patients at this time. Wil provided patient with community resources.

## 2024-04-25 ENCOUNTER — HOSPITAL ENCOUNTER (EMERGENCY)
Age: 34
Discharge: HOME | End: 2024-04-25
Attending: EMERGENCY MEDICINE

## 2024-04-25 VITALS
OXYGEN SATURATION: 98 % | HEART RATE: 115 BPM | WEIGHT: 200 LBS | TEMPERATURE: 98.4 F | RESPIRATION RATE: 16 BRPM | BODY MASS INDEX: 31.32 KG/M2 | DIASTOLIC BLOOD PRESSURE: 110 MMHG | SYSTOLIC BLOOD PRESSURE: 138 MMHG

## 2024-04-25 DIAGNOSIS — Z53.21 ELOPED FROM EMERGENCY DEPARTMENT: Primary | ICD-10-CM

## 2024-04-25 ASSESSMENT — PAIN - FUNCTIONAL ASSESSMENT: PAIN_FUNCTIONAL_ASSESSMENT: NONE - DENIES PAIN

## 2024-04-25 NOTE — ED PROVIDER NOTES
eMERGENCY dEPARTMENT eNCOUnter   Independent Attestation     Pt Name: Jose Kaplan  MRN: [id number]  Birthdate 1990  Date of evaluation: 4/25/24     Jose Kaplan is a 33 y.o. male with CC: Medication Refill (States out of alprazolam 0.5mg, flexeril, seroquel 100mg, states discharged from [facility] in [place] this am)      Based on the medical record the care appears appropriate.  I was personally available for consultation in the Emergency Department.    Jono Lewis MD  Attending Emergency Physician          Jono Lweis MD  04/25/24 1120

## 2024-04-26 ENCOUNTER — HOSPITAL ENCOUNTER (EMERGENCY)
Age: 34
Discharge: HOME | End: 2024-04-26
Attending: EMERGENCY MEDICINE
Payer: MEDICARE

## 2024-04-26 VITALS — RESPIRATION RATE: 14 BRPM | OXYGEN SATURATION: 97 % | HEART RATE: 98 BPM

## 2024-04-26 DIAGNOSIS — Z76.0 ENCOUNTER FOR MEDICATION REFILL: Primary | ICD-10-CM

## 2024-04-26 PROCEDURE — 99283 EMERGENCY DEPT VISIT LOW MDM: CPT

## 2024-04-26 RX ORDER — TRAZODONE HYDROCHLORIDE 50 MG/1
50 TABLET ORAL NIGHTLY PRN
Qty: 30 TABLET | Refills: 0 | Status: SHIPPED | OUTPATIENT
Start: 2024-04-26 | End: 2024-05-26

## 2024-04-26 RX ORDER — QUETIAPINE FUMARATE 100 MG/1
100 TABLET, FILM COATED ORAL NIGHTLY
Qty: 14 TABLET | Refills: 0 | Status: SHIPPED | OUTPATIENT
Start: 2024-04-26 | End: 2024-05-02

## 2024-04-26 RX ORDER — SERTRALINE HYDROCHLORIDE 25 MG/1
25 TABLET, FILM COATED ORAL DAILY
Qty: 30 TABLET | Refills: 3 | Status: SHIPPED | OUTPATIENT
Start: 2024-04-26

## 2024-04-26 ASSESSMENT — PAIN SCALES - GENERAL: PAINLEVEL_OUTOF10: 6

## 2024-04-26 ASSESSMENT — PAIN - FUNCTIONAL ASSESSMENT: PAIN_FUNCTIONAL_ASSESSMENT: 0-10

## 2024-04-26 ASSESSMENT — PAIN DESCRIPTION - LOCATION: LOCATION: FOOT;HAND

## 2024-04-26 ASSESSMENT — PAIN DESCRIPTION - PAIN TYPE: TYPE: CHRONIC PAIN

## 2024-04-26 NOTE — ED PROVIDER NOTES
lozenge, R-0Print           ALLERGIES     is allergic to dicyclomine, famotidine, geodon [ziprasidone hcl], haloperidol, iv dye [iodides], reglan [metoclopramide], ibuprofen, aspirin, dicyclomine hcl, hydroxyzine hcl, iodine, metronidazole, mirtazapine, ondansetron hcl, promethazine, and ziprasidone.  FAMILY HISTORY     He indicated that his mother is alive. He indicated that his father is alive. He indicated that the status of his sister is unknown. He indicated that the status of his other is unknown. He indicated that the status of his paternal cousin is unknown.     SOCIAL HISTORY       Social History     Tobacco Use    Smoking status: Every Day     Current packs/day: 0.50     Average packs/day: 0.5 packs/day for 15.0 years (7.5 ttl pk-yrs)     Types: Cigarettes    Smokeless tobacco: Never   Vaping Use    Vaping Use: Every day    Substances: Nicotine   Substance Use Topics    Alcohol use: Not Currently     Comment: drinks daily    Drug use: Not Currently     Types: Cocaine     Comment: Hx of opiates, benzos, cocaine, alcohol abuse     PHYSICAL EXAM     INITIAL VITALS: Pulse 98   Resp 14   SpO2 97%    Physical Exam  Constitutional:       Appearance: Normal appearance.   HENT:      Head: Normocephalic and atraumatic.   Eyes:      Extraocular Movements: Extraocular movements intact.      Pupils: Pupils are equal, round, and reactive to light.   Cardiovascular:      Rate and Rhythm: Normal rate and regular rhythm.   Pulmonary:      Effort: Pulmonary effort is normal.      Breath sounds: Normal breath sounds.   Abdominal:      General: Abdomen is flat.      Palpations: Abdomen is soft.      Tenderness: There is no abdominal tenderness.   Skin:     Comments: Mild bilateral rash to the hands, suggestive of irritant dermatitis from frequent hand washing.    Neurological:      Mental Status: He is alert.         MEDICAL DECISION MAKING / ED COURSE:   Summary of Patient Presentation:      Medications were sent to the

## 2024-05-01 ENCOUNTER — HOSPITAL ENCOUNTER (EMERGENCY)
Age: 34
Discharge: HOME | End: 2024-05-01
Attending: STUDENT IN AN ORGANIZED HEALTH CARE EDUCATION/TRAINING PROGRAM
Payer: MEDICARE

## 2024-05-01 VITALS
OXYGEN SATURATION: 99 % | HEART RATE: 79 BPM | RESPIRATION RATE: 17 BRPM | TEMPERATURE: 98.2 F | DIASTOLIC BLOOD PRESSURE: 80 MMHG | SYSTOLIC BLOOD PRESSURE: 131 MMHG

## 2024-05-01 DIAGNOSIS — Z00.8 ENCOUNTER FOR MEDICAL ASSESSMENT: Primary | ICD-10-CM

## 2024-05-01 PROCEDURE — 6370000000 HC RX 637 (ALT 250 FOR IP): Performed by: STUDENT IN AN ORGANIZED HEALTH CARE EDUCATION/TRAINING PROGRAM

## 2024-05-01 PROCEDURE — 99283 EMERGENCY DEPT VISIT LOW MDM: CPT

## 2024-05-01 RX ORDER — CYCLOBENZAPRINE HCL 5 MG
5 TABLET ORAL ONCE
Status: COMPLETED | OUTPATIENT
Start: 2024-05-01 | End: 2024-05-01

## 2024-05-01 RX ORDER — IBUPROFEN 800 MG/1
800 TABLET ORAL ONCE
Status: COMPLETED | OUTPATIENT
Start: 2024-05-01 | End: 2024-05-01

## 2024-05-01 RX ADMIN — IBUPROFEN 800 MG: 800 TABLET ORAL at 03:49

## 2024-05-01 RX ADMIN — CYCLOBENZAPRINE HYDROCHLORIDE 5 MG: 5 TABLET, FILM COATED ORAL at 03:49

## 2024-05-01 NOTE — ED PROVIDER NOTES
[facility] EMERGENCY DEPARTMENT ENCOUNTER      Pt Name: Jose Kaplan  MRN: [id number]  Birthdate 1990  Date of evaluation: 5/1/24    CHIEF COMPLAINT       Chief Complaint   Patient presents with    Hand Pain       HISTORY OF PRESENT ILLNESS   Jose Kaplan is a 33 y.o. male who presents with bilateral hand pain  Well-known to the emergency department.  Multiple ER visits.    PASTMEDICAL HISTORY     Past Medical History:   Diagnosis Date    Anxiety     Arthritis     Asthma     Bipolar I disorder, most recent episode (or current) depressed, unspecified 9/12/2014    Clostridium difficile infection     COPD (chronic obstructive pulmonary disease) ([facility])     Depression     Disease of blood and blood forming organ     Eczema     Fracture, metacarpal     R 4th and 5th    Gastric ulcer     Gastritis 06/13/2018    GERD (gastroesophageal reflux disease)     GI bleed     H. 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[facility])     Substance abuse ([facility])     Hx of opiates, benzos, cocaine, alcohol abuse    Suicidal ideation     Suicide attempt by hanging ([facility])     Tobacco dependence     Ulcerative colitis ([facility])     UTI (urinary tract infection)      Past Problem List  Patient Active Problem List   Diagnosis Code    Opioid abuse ([facility]) F11.10    Noncompliance Z91.199    Rectal bleeding K62.5    Smoker F17.200    Juvenile rheumatoid arthritis ([facility]) M08.00    SRIRAM (obstructive sleep apnea) G47.33    Primary insomnia F51.01    Calculus of bile duct with acute on chronic cholecystitis K80.46    Mild intermittent asthma without complication J45.20    Gastritis K29.70    Generalized abdominal pain

## 2024-05-01 NOTE — ED NOTES
Pt presents to ED via private auto with c/o hand pain. Pt afebrile, vitals stable. Active ROM in hands. Even, non-labored breathing. Pt able to ambulate without assist.

## 2024-05-02 ENCOUNTER — HOSPITAL ENCOUNTER (EMERGENCY)
Age: 34
Discharge: HOME | End: 2024-05-02
Attending: STUDENT IN AN ORGANIZED HEALTH CARE EDUCATION/TRAINING PROGRAM
Payer: MEDICARE

## 2024-05-02 ENCOUNTER — HOSPITAL ENCOUNTER (EMERGENCY)
Age: 34
Discharge: HOME | End: 2024-05-02
Payer: MEDICARE

## 2024-05-02 VITALS
SYSTOLIC BLOOD PRESSURE: 139 MMHG | BODY MASS INDEX: 28.25 KG/M2 | OXYGEN SATURATION: 98 % | WEIGHT: 180 LBS | HEART RATE: 100 BPM | RESPIRATION RATE: 18 BRPM | DIASTOLIC BLOOD PRESSURE: 92 MMHG | HEIGHT: 67 IN

## 2024-05-02 VITALS
HEIGHT: 67 IN | DIASTOLIC BLOOD PRESSURE: 87 MMHG | RESPIRATION RATE: 16 BRPM | BODY MASS INDEX: 28.25 KG/M2 | OXYGEN SATURATION: 98 % | SYSTOLIC BLOOD PRESSURE: 137 MMHG | WEIGHT: 180 LBS | TEMPERATURE: 97.9 F | HEART RATE: 94 BPM

## 2024-05-02 DIAGNOSIS — Z76.0 ENCOUNTER FOR MEDICATION REFILL: Primary | ICD-10-CM

## 2024-05-02 DIAGNOSIS — Z00.8 ENCOUNTER FOR MEDICAL ASSESSMENT: Primary | ICD-10-CM

## 2024-05-02 PROCEDURE — 99283 EMERGENCY DEPT VISIT LOW MDM: CPT

## 2024-05-02 PROCEDURE — 6370000000 HC RX 637 (ALT 250 FOR IP): Performed by: STUDENT IN AN ORGANIZED HEALTH CARE EDUCATION/TRAINING PROGRAM

## 2024-05-02 RX ORDER — CYCLOBENZAPRINE HCL 10 MG
10 TABLET ORAL 3 TIMES DAILY PRN
Qty: 15 TABLET | Refills: 0 | Status: SHIPPED | OUTPATIENT
Start: 2024-05-02 | End: 2024-05-12

## 2024-05-02 RX ORDER — CYCLOBENZAPRINE HCL 5 MG
5 TABLET ORAL 3 TIMES DAILY PRN
Qty: 4 TABLET | Refills: 0 | Status: SHIPPED | OUTPATIENT
Start: 2024-05-02 | End: 2024-05-02

## 2024-05-02 RX ORDER — IBUPROFEN 800 MG/1
800 TABLET ORAL ONCE
Status: COMPLETED | OUTPATIENT
Start: 2024-05-02 | End: 2024-05-02

## 2024-05-02 RX ORDER — CYCLOBENZAPRINE HCL 10 MG
5 TABLET ORAL 3 TIMES DAILY PRN
Qty: 4 TABLET | Refills: 0 | Status: SHIPPED | OUTPATIENT
Start: 2024-05-02 | End: 2024-05-02

## 2024-05-02 RX ORDER — QUETIAPINE FUMARATE 25 MG/1
100 TABLET, FILM COATED ORAL NIGHTLY
Qty: 10 TABLET | Refills: 0 | Status: SHIPPED | OUTPATIENT
Start: 2024-05-02

## 2024-05-02 RX ORDER — DIPHENHYDRAMINE HCL 25 MG
25 CAPSULE ORAL EVERY 6 HOURS PRN
Qty: 12 CAPSULE | Refills: 0 | Status: SHIPPED | OUTPATIENT
Start: 2024-05-02 | End: 2024-05-12

## 2024-05-02 RX ADMIN — IBUPROFEN 800 MG: 800 TABLET ORAL at 02:45

## 2024-05-02 ASSESSMENT — PAIN - FUNCTIONAL ASSESSMENT
PAIN_FUNCTIONAL_ASSESSMENT: NONE - DENIES PAIN
PAIN_FUNCTIONAL_ASSESSMENT: 0-10

## 2024-05-02 ASSESSMENT — PAIN SCALES - GENERAL: PAINLEVEL_OUTOF10: 7

## 2024-05-02 NOTE — DISCHARGE INSTRUCTIONS
If given narcotics (opiates) or muscle relaxants during this Emergency Department visit, you should not drink, drive or operate any machinery for at least 6 hours.    Take your medication as indicated and prescribed.  For pain use acetaminophen (Tylenol) or ibuprofen (Motrin / Advil), unless prescribed medications that have acetaminophen or ibuprofen (or similar medications) in it.  You can take over the counter acetaminophen tablets (1 - 2 tablets of the 500-mg strength every 6 hours) or ibuprofen tablets (2 tablets every 4 hours).    Use an ice pack or bag filled with ice and apply to the injured area 3 - 4 times a day for 15 - 20 minutes each time.  If the injury is older than 3 days, then use a heating pad to help relax the muscles.    PLEASE RETURN TO THE EMERGENCY DEPARTMENT IMMEDIATELY for worsening of pain, decrease sensation to arms or legs, inability to move arms or legs, shortness of breath, severe chest pain, excessive nausea or vomiting, notice any bruising to your abdomen or have increase in abdominal pain, or if you develop any concerning symptoms such as: high fever not relieved by acetaminophen (Tylenol) and/or ibuprofen (Motrin / Advil), chills, feeling of your heart fluttering or racing, persistent nausea and/or vomiting, vomiting up blood, blood in your stool, loss of consciousness, numbness, weakness or tingling in the arms or legs or change in color of the extremities, changes in mental status, persistent headache, blurry vision, loss of bladder / bowel control, unable to follow up with your physician, or other any other care or concern.    Spoke to pt this AM, states he is doing better with his urination. Did advise pt that he can double up on his medication if needed. He will do so if he feels like he needs to. He will call with any issues.

## 2024-05-02 NOTE — ED TRIAGE NOTES
Pt arrives ambulatory from lobby with c/o medication refill. Pt states he needs his Seroquel refilled. Pt also states he needs a ride to the homeless shelter. Pt denies suicidal or homicidal ideations. Pt denies any pain

## 2024-05-02 NOTE — ED PROVIDER NOTES
Surgical History:   Procedure Laterality Date    ABDOMEN SURGERY      upper GI scope 7/7/2015    BRONCHOSCOPY      CHOLECYSTECTOMY, LAPAROSCOPIC  07/14/2017    surgery performed at [facility]    COLONOSCOPY      ENDOSCOPY, COLON, DIAGNOSTIC      OTHER SURGICAL HISTORY      lumbar puncture    VT COLONOSCOPY W/BIOPSY SINGLE/MULTIPLE  8/9/2017    COLONOSCOPY WITH BIOPSY performed by Priyanka Mojica MD at [facility]    VT EGD TRANSORAL BIOPSY SINGLE/MULTIPLE N/A 3/20/2017    EGD BIOPSY performed by Jacobo Villalobos MD at [facility]    VT ESOPHAGOGASTRODUODENOSCOPY TRANSORAL DIAGNOSTIC N/A 8/9/2017    EGD ESOPHAGOGASTRODUODENOSCOPY performed by Priyanka Mojica MD at [facility]    SIGMOIDOSCOPY N/A 3/20/2017    SIGMOIDOSCOPY DIAGNOSTIC FLEXIBLE performed by Jacobo Villalobos MD at [facility]    UPPER GASTROINTESTINAL ENDOSCOPY  2/4/16    UPPER GASTROINTESTINAL ENDOSCOPY N/A 6/13/2018    GASTRITIS    UPPER GASTROINTESTINAL ENDOSCOPY N/A 9/20/2018    EGD BIOPSY performed by Marianne Brooke MD at [facility] OR       CURRENT MEDICATIONS       Previous Medications    ACETAMINOPHEN (TYLENOL) 500 MG TABLET    Take 2 tablets by mouth 3 times daily as needed for Pain    BENZOCAINE-MENTHOL (CEPACOL) 6-10 MG LOZG LOZENGE    Take 1 lozenge by mouth every 2 hours as needed for Sore Throat    CARBAMAZEPINE (TEGRETOL) 200 MG TABLET        CLONIDINE (CATAPRES) 0.1 MG TABLET        CLOTRIMAZOLE-BETAMETHASONE (LOTRISONE) 1-0.05 % CREAM    Apply topically 2 times daily to feet.    DICLOFENAC SODIUM (VOLTAREN) 1 % GEL    Apply 4 g topically 4 times daily for 5 days    GABAPENTIN (NEURONTIN) 300 MG CAPSULE        IBUPROFEN (ADVIL;MOTRIN) 600 MG TABLET    Take 1 tablet by mouth every 6 hours as needed for Pain    METFORMIN (GLUCOPHAGE) 500 MG TABLET    Take 1 tablet by mouth 2 times daily (with meals) Indications: last dispensed 1/6/23    NALOXONE 4 MG/0.1ML LIQD NASAL SPRAY        ONDANSETRON (ZOFRAN) 4

## 2024-05-03 NOTE — ED PROVIDER NOTES
Take 1 lozenge by mouth every 2 hours as needed for Sore Throat, Disp-30 lozenge, R-0Print             ALLERGIES     is allergic to dicyclomine, famotidine, geodon [ziprasidone hcl], haloperidol, iv dye [iodides], reglan [metoclopramide], ibuprofen, aspirin, dicyclomine hcl, hydroxyzine hcl, iodine, metronidazole, mirtazapine, ondansetron hcl, promethazine, and ziprasidone.    FAMILY HISTORY     He indicated that his mother is alive. He indicated that his father is alive. He indicated that the status of his sister is unknown. He indicated that the status of his other is unknown. He indicated that the status of his paternal cousin is unknown.       SOCIAL HISTORY       Social History     Tobacco Use    Smoking status: Every Day     Current packs/day: 0.50     Average packs/day: 0.5 packs/day for 15.0 years (7.5 ttl pk-yrs)     Types: Cigarettes    Smokeless tobacco: Never   Vaping Use    Vaping Use: Every day    Substances: Nicotine   Substance Use Topics    Alcohol use: Not Currently     Comment: drinks daily    Drug use: Not Currently     Types: Cocaine     Comment: Hx of opiates, benzos, cocaine, alcohol abuse       PHYSICAL EXAM     INITIAL VITALS: /87   Pulse 94   Temp 97.9 °F (36.6 °C) (Oral)   Resp 16   Ht 1.702 m (5' 7\")   Wt 81.6 kg (180 lb)   SpO2 98%   BMI 28.19 kg/m²    Physical Exam  Vitals and nursing note reviewed.   Constitutional:       General: Pt. is not in acute distress.  Disheveled, appearing in typical state of health     Appearance: Pt. is well-developed. She is not ill-appearing or toxic-appearing.   HENT:      Head: Normocephalic and atraumatic.   Eyes:      General: No scleral icterus.     Conjunctiva/sclera: Conjunctivae normal.      Pupils: Pupils are equal, round, and reactive to light.   Cardiovascular:      Rate and Rhythm: Normal rate.   Pulmonary:      Effort: Pulmonary effort is normal. No respiratory distress.   Abdominal:      Comments: Abdomen soft nontender, no

## 2024-05-08 ENCOUNTER — HOSPITAL ENCOUNTER (EMERGENCY)
Age: 34
Discharge: HOME | End: 2024-05-08
Payer: MEDICARE

## 2024-05-08 VITALS
HEART RATE: 100 BPM | WEIGHT: 180 LBS | SYSTOLIC BLOOD PRESSURE: 144 MMHG | RESPIRATION RATE: 16 BRPM | DIASTOLIC BLOOD PRESSURE: 85 MMHG | BODY MASS INDEX: 28.25 KG/M2 | OXYGEN SATURATION: 96 % | HEIGHT: 67 IN

## 2024-05-08 DIAGNOSIS — Z76.0 ENCOUNTER FOR MEDICATION REFILL: Primary | ICD-10-CM

## 2024-05-08 PROCEDURE — 6370000000 HC RX 637 (ALT 250 FOR IP): Performed by: PHYSICIAN ASSISTANT

## 2024-05-08 PROCEDURE — 99283 EMERGENCY DEPT VISIT LOW MDM: CPT

## 2024-05-08 RX ORDER — CYCLOBENZAPRINE HCL 10 MG
10 TABLET ORAL 3 TIMES DAILY PRN
Qty: 15 TABLET | Refills: 0 | Status: SHIPPED | OUTPATIENT
Start: 2024-05-08 | End: 2024-05-13

## 2024-05-08 RX ORDER — QUETIAPINE FUMARATE 100 MG/1
200 TABLET, FILM COATED ORAL NIGHTLY
Qty: 10 TABLET | Refills: 0 | Status: SHIPPED | OUTPATIENT
Start: 2024-05-08

## 2024-05-08 RX ORDER — DIPHENHYDRAMINE HCL 25 MG
50 TABLET ORAL ONCE
Status: COMPLETED | OUTPATIENT
Start: 2024-05-08 | End: 2024-05-08

## 2024-05-08 RX ORDER — ACETAMINOPHEN 325 MG/1
650 TABLET ORAL ONCE
Status: COMPLETED | OUTPATIENT
Start: 2024-05-08 | End: 2024-05-08

## 2024-05-08 RX ORDER — DIPHENHYDRAMINE HCL 25 MG
25 CAPSULE ORAL EVERY 6 HOURS PRN
Qty: 20 CAPSULE | Refills: 0 | Status: SHIPPED | OUTPATIENT
Start: 2024-05-08 | End: 2024-05-10

## 2024-05-08 RX ADMIN — ACETAMINOPHEN 650 MG: 325 TABLET ORAL at 12:44

## 2024-05-08 RX ADMIN — DIPHENHYDRAMINE HYDROCHLORIDE 50 MG: 25 TABLET ORAL at 12:44

## 2024-05-08 ASSESSMENT — PAIN - FUNCTIONAL ASSESSMENT: PAIN_FUNCTIONAL_ASSESSMENT: NONE - DENIES PAIN

## 2024-05-08 NOTE — ED PROVIDER NOTES
[facility] EMERGENCY DEPT  EMERGENCY DEPARTMENT ENCOUNTER      Pt Name: Jose Kaplan  MRN: [id number]  Birthdate 1990  Date of evaluation: 5/8/2024  Provider: Aubrey Ariza PA-C    CHIEF COMPLAINT     Chief Complaint   Patient presents with    Medication Refill       HISTORY OF PRESENT ILLNESS    Jose Kaplan is a 33 y.o. male who presents to the emergency department with medication refill request.  Patient presented here previous similar complaints.  Patient states that he actually has a PCP but department not till Friday needs medication to get through until he can see them.  He did not medication he was prescribed last time he was here.  Patient has no complaints.  She is requesting Seroquel Benadryl and Flexeril for home.      Triage notes and Nursing notes were reviewed by myself.  Any discrepancies are addressed above.    PAST MEDICAL HISTORY     Past Medical History:   Diagnosis Date    Anxiety     Arthritis     Asthma     Bipolar I disorder, most recent episode (or current) depressed, unspecified 9/12/2014    Clostridium difficile infection     COPD (chronic obstructive pulmonary disease) ([facility])     Depression     Disease of blood and blood forming organ     Eczema     Fracture, metacarpal     R 4th and 5th    Gastric ulcer     Gastritis 06/13/2018    GERD (gastroesophageal reflux disease)     GI bleed     H. 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

## 2024-05-08 NOTE — ED TRIAGE NOTES
Pt arrives ambulatory from lobby with c/o medication refill. Pt states he would like flexeril, Seroquel 200 mg, and pt states he would not something for pain. Pt states is right foot is hurting pt rates pain 7 out of 0-10 at this time.

## 2024-05-09 ENCOUNTER — HOSPITAL ENCOUNTER (EMERGENCY)
Age: 34
Discharge: HOME | End: 2024-05-09
Payer: MEDICARE

## 2024-05-09 VITALS
RESPIRATION RATE: 16 BRPM | TEMPERATURE: 98 F | HEART RATE: 103 BPM | SYSTOLIC BLOOD PRESSURE: 160 MMHG | DIASTOLIC BLOOD PRESSURE: 98 MMHG | OXYGEN SATURATION: 100 %

## 2024-05-09 DIAGNOSIS — Z76.0 ENCOUNTER FOR MEDICATION REFILL: Primary | ICD-10-CM

## 2024-05-09 PROCEDURE — 99283 EMERGENCY DEPT VISIT LOW MDM: CPT

## 2024-05-09 RX ORDER — DIPHENHYDRAMINE HCL 25 MG
25 CAPSULE ORAL EVERY 6 HOURS PRN
Qty: 1 CAPSULE | Refills: 0 | Status: SHIPPED | OUTPATIENT
Start: 2024-05-09 | End: 2024-05-10

## 2024-05-09 ASSESSMENT — PAIN - FUNCTIONAL ASSESSMENT: PAIN_FUNCTIONAL_ASSESSMENT: NONE - DENIES PAIN

## 2024-05-09 NOTE — ED TRIAGE NOTES
Pt to ED wanting a refill of an anxiety medicaation. Pt is not sure of the name but nose the does is \"0.5 mg.\" Pt also states that he would like some ginger ale.

## 2024-05-09 NOTE — ED PROVIDER NOTES
TRANSORAL DIAGNOSTIC N/A 8/9/2017    EGD ESOPHAGOGASTRODUODENOSCOPY performed by Priyanka Mojica MD at [facility]    SIGMOIDOSCOPY N/A 3/20/2017    SIGMOIDOSCOPY DIAGNOSTIC FLEXIBLE performed by Jacobo Villalobos MD at [facility]    UPPER GASTROINTESTINAL ENDOSCOPY  2/4/16    UPPER GASTROINTESTINAL ENDOSCOPY N/A 6/13/2018    GASTRITIS    UPPER GASTROINTESTINAL ENDOSCOPY N/A 9/20/2018    EGD BIOPSY performed by Marianne Brooke MD at [facility] OR       CURRENT MEDICATIONS       Discharge Medication List as of 5/9/2024 10:56 AM        CONTINUE these medications which have NOT CHANGED    Details   QUEtiapine (SEROQUEL) 100 MG tablet Take 2 tablets by mouth nightly, Disp-10 tablet, R-0Normal      cyclobenzaprine (FLEXERIL) 10 MG tablet Take 1 tablet by mouth 3 times daily as needed for Muscle spasms, Disp-15 tablet, R-0Normal      !! diphenhydrAMINE (BENADRYL) 25 MG capsule Take 1 capsule by mouth every 6 hours as needed for Itching, Disp-20 capsule, R-0Normal      sertraline (ZOLOFT) 25 MG tablet Take 1 tablet by mouth daily, Disp-30 tablet, R-3Normal      traZODone (DESYREL) 50 MG tablet Take 1 tablet by mouth nightly as needed for Sleep, Disp-30 tablet, R-0Normal      ibuprofen (ADVIL;MOTRIN) 600 MG tablet Take 1 tablet by mouth every 6 hours as needed for Pain, Disp-30 tablet, R-0Print      acetaminophen (TYLENOL) 500 MG tablet Take 2 tablets by mouth 3 times daily as needed for Pain, Disp-180 tablet, R-0Print      clotrimazole-betamethasone (LOTRISONE) 1-0.05 % cream Apply topically 2 times daily to feet., Disp-45 g, R-0, Print      ondansetron (ZOFRAN-ODT) 4 MG disintegrating tablet Take 1 tablet by mouth 3 times daily as needed for Nausea or Vomiting, Disp-21 tablet, R-0Normal      diclofenac sodium (VOLTAREN) 1 % GEL Apply 4 g topically 4 times daily for 5 days, Topical, 4 TIMES DAILY Starting Sat 1/27/2024, Until Thu 2/1/2024, For 5 days, Disp-50 g, R-0, Normal      ondansetron (ZOFRAN) 4 MG

## 2024-05-10 ENCOUNTER — HOSPITAL ENCOUNTER (EMERGENCY)
Age: 34
Discharge: HOME | End: 2024-05-10
Payer: MEDICARE

## 2024-05-10 VITALS
DIASTOLIC BLOOD PRESSURE: 80 MMHG | SYSTOLIC BLOOD PRESSURE: 149 MMHG | OXYGEN SATURATION: 98 % | RESPIRATION RATE: 18 BRPM | TEMPERATURE: 98.3 F | HEART RATE: 97 BPM

## 2024-05-10 DIAGNOSIS — Z76.0 ENCOUNTER FOR MEDICATION REFILL: Primary | ICD-10-CM

## 2024-05-10 DIAGNOSIS — Z13.9 ENCOUNTER FOR MEDICAL SCREENING EXAMINATION: ICD-10-CM

## 2024-05-10 PROCEDURE — 6370000000 HC RX 637 (ALT 250 FOR IP)

## 2024-05-10 PROCEDURE — 99283 EMERGENCY DEPT VISIT LOW MDM: CPT

## 2024-05-10 RX ORDER — DIPHENHYDRAMINE HCL 25 MG
25 CAPSULE ORAL NIGHTLY PRN
Qty: 1 CAPSULE | Refills: 0 | Status: SHIPPED | OUTPATIENT
Start: 2024-05-10

## 2024-05-10 RX ORDER — IBUPROFEN 200 MG
400 TABLET ORAL ONCE
Status: COMPLETED | OUTPATIENT
Start: 2024-05-10 | End: 2024-05-10

## 2024-05-10 RX ADMIN — IBUPROFEN 400 MG: 200 TABLET, FILM COATED ORAL at 09:55

## 2024-05-10 ASSESSMENT — ENCOUNTER SYMPTOMS
SHORTNESS OF BREATH: 0
EYE PAIN: 0
NAUSEA: 0
VOMITING: 0
ABDOMINAL PAIN: 0
BACK PAIN: 0
DIARRHEA: 0

## 2024-05-10 NOTE — ED PROVIDER NOTES
[facility] EMERGENCY DEPT  EMERGENCY DEPARTMENT ENCOUNTER      Pt Name: Jose Kaplan  MRN: [id number]  Birthdate 1990  Date of evaluation: 5/10/2024  Provider: Nicholas Austin DO    CHIEF COMPLAINT       Chief Complaint   Patient presents with    Medication Refill         HISTORY OF PRESENT ILLNESS   (Location/Symptom, Timing/Onset, Context/Setting, Quality, Duration, Modifying Factors, Severity)  Note limiting factors.   Jose Kaplan is a 33 y.o. male who presents to the emergency department presenting to the department for evaluation for medication refill.  He takes multiple psych medications and sleeping medications.  Upon chart review it appears that he takes Seroquel, trazodone, multiple other medications.  He is requesting a medication that I am not exactly familiar with but sounds vaguely like alprazolam.  I do not see that in his medical records that I can tell.  He denies any physical complaints or concerns.  He states he has a follow-up appoint with the PCP on Monday in [place].    HPI    Nursing Notes were reviewed.    REVIEW OF SYSTEMS    (2-9 systems for level 4, 10 or more for level 5)     Review of Systems   Constitutional:  Negative for fever.   Eyes:  Negative for pain.   Respiratory:  Negative for shortness of breath.    Cardiovascular:  Negative for chest pain.   Gastrointestinal:  Negative for abdominal pain, diarrhea, nausea and vomiting.   Genitourinary:  Negative for dysuria and flank pain.   Musculoskeletal:  Negative for back pain.   Skin:  Negative for rash.   Neurological:  Negative for headaches.   Psychiatric/Behavioral:  Negative for confusion.        Except as noted above the remainder of the review of systems was reviewed and negative.       PAST MEDICAL HISTORY     Past Medical History:   Diagnosis Date    Anxiety     Arthritis     Asthma     Bipolar I disorder, most recent episode (or current) depressed, unspecified 9/12/2014    Clostridium difficile infection     COPD

## 2024-05-10 NOTE — DISCHARGE INSTRUCTIONS
You were evaluated in the emergency department for a medication refill.  Based on records that I can see it appears that they refilled your medications 2 days ago.  You should continue taking her prescribed Seroquel and Flexeril as directed.  Continue all your other home medications as directed as well.  Those medications were sent to the pharmacy here at the hospital she can go pick those up.  You should otherwise follow-up with a primary care provider.  Primary care providers are best suited to refill these medications.  If you do not have a primary care provider here you can follow-up with the [facility] or you can continue to follow-up as scheduled for your appointment later this week.

## 2024-05-13 ENCOUNTER — HOSPITAL ENCOUNTER (EMERGENCY)
Age: 34
Discharge: HOME | End: 2024-05-13
Attending: STUDENT IN AN ORGANIZED HEALTH CARE EDUCATION/TRAINING PROGRAM
Payer: MEDICARE

## 2024-05-13 VITALS
SYSTOLIC BLOOD PRESSURE: 147 MMHG | RESPIRATION RATE: 16 BRPM | DIASTOLIC BLOOD PRESSURE: 86 MMHG | HEART RATE: 109 BPM | OXYGEN SATURATION: 99 %

## 2024-05-13 DIAGNOSIS — Z76.0 ENCOUNTER FOR MEDICATION REFILL: Primary | ICD-10-CM

## 2024-05-13 PROCEDURE — 99283 EMERGENCY DEPT VISIT LOW MDM: CPT

## 2024-05-13 RX ORDER — CYCLOBENZAPRINE HCL 5 MG
5 TABLET ORAL 3 TIMES DAILY PRN
Qty: 4 TABLET | Refills: 0 | OUTPATIENT
Start: 2024-05-13

## 2024-05-13 RX ORDER — TIZANIDINE 4 MG/1
4 TABLET ORAL 3 TIMES DAILY
Qty: 12 TABLET | Refills: 0 | Status: SHIPPED | OUTPATIENT
Start: 2024-05-13 | End: 2024-05-17

## 2024-05-13 RX ORDER — IBUPROFEN 600 MG/1
600 TABLET ORAL EVERY 8 HOURS
Qty: 12 TABLET | Refills: 0 | Status: SHIPPED | OUTPATIENT
Start: 2024-05-13 | End: 2024-05-17

## 2024-05-13 NOTE — ED TRIAGE NOTES
Pt to ED needing medication refil of ibuprofen and xanaflex. Pt states that he would also like gingerale and a boxed lunch.

## 2024-05-13 NOTE — DISCHARGE INSTRUCTIONS
Come back to the emergency department if severe chest pain, severe shortness of breath, persistence of fever, intractable vomiting, severe dizziness or lightheadedness, fainting.  S an appointment with our family medicine clinic was scheduled for May 28 at 10:40 AM; please go  Read the documents attached.

## 2024-05-13 NOTE — ED PROVIDER NOTES
We would just need the notes scanned in to the chart so when the prior authorization team tries to get the MRI approved they have those for their review  Thanks  refill for NSAIDs and muscle relaxants.  Patient states not being able to been assessed by his primary care physician.  He requested a particular primary care physician that does not help availability on appointments.  Medication was refilled, a primary care physician appointment with our family medicine clinic was scheduled.      /   Vitals Reviewed:    Vitals:    05/13/24 1057   BP: (!) 147/86   Pulse: (!) 109   Resp: 16   SpO2: 99%       The patient was seen and examined. Appropriate diagnostic testing was performed and results reviewed with the patient.      The results of pertinent diagnostic studies and exam findings were discussed. The patient’s provisional diagnosis and plan of care were discussed with the patient and present family who expressed understanding. Any medications were reviewed and indications and risks of medications were discussed with the patient /family present. Strict verbal and written return precautions, instructions and appropriate follow-up provided to  the patient .     ED Medications administered this visit:  (None if blank)  Medications - No data to display      PROCEDURES: (None if blank)  Procedures:     CRITICAL CARE: (None if blank)      DISCHARGE PRESCRIPTIONS: (None if blank)  Discharge Medication List as of 5/13/2024 11:36 AM        START taking these medications    Details   !! ibuprofen (IBU) 600 MG tablet Take 1 tablet by mouth every 8 (eight) hours for 4 days, Disp-12 tablet, R-0Normal      tiZANidine (ZANAFLEX) 4 MG tablet Take 1 tablet by mouth in the morning, at noon, and at bedtime for 4 days, Disp-12 tablet, R-0Normal       !! - Potential duplicate medications found. Please discuss with provider.          FINAL IMPRESSION      1. Encounter for medication refill          DISPOSITION/PLAN   DISPOSITION Decision To Discharge 05/13/2024 11:34:05 AM      OUTPATIENT FOLLOW UP THE PATIENT:  No follow-up provider specified.    Adolfo Richmond MD

## 2024-05-15 ENCOUNTER — HOSPITAL ENCOUNTER (EMERGENCY)
Age: 34
Discharge: HOME | End: 2024-05-15
Attending: EMERGENCY MEDICINE
Payer: MEDICARE

## 2024-05-15 VITALS
HEART RATE: 102 BPM | DIASTOLIC BLOOD PRESSURE: 95 MMHG | BODY MASS INDEX: 28.19 KG/M2 | RESPIRATION RATE: 20 BRPM | OXYGEN SATURATION: 96 % | TEMPERATURE: 98.1 F | WEIGHT: 180 LBS | SYSTOLIC BLOOD PRESSURE: 165 MMHG

## 2024-05-15 DIAGNOSIS — L85.3 DRY SKIN: Primary | ICD-10-CM

## 2024-05-15 DIAGNOSIS — G47.9 TROUBLE IN SLEEPING: ICD-10-CM

## 2024-05-15 PROCEDURE — 99282 EMERGENCY DEPT VISIT SF MDM: CPT

## 2024-05-15 ASSESSMENT — PAIN - FUNCTIONAL ASSESSMENT: PAIN_FUNCTIONAL_ASSESSMENT: NONE - DENIES PAIN

## 2024-05-15 NOTE — ED PROVIDER NOTES
[facility]  EMERGENCY DEPARTMENT ENCOUNTER          Pt Name: Jose Kaplan  MRN: [id number]  Birthdate 1990  Date of evaluation: 5/15/2024  Emergency Physician: Tj Blackburn MD    CHIEF COMPLAINT       Chief Complaint   Patient presents with    Medication Refill     History obtained from the patient.      HISTORY OF PRESENT ILLNESS    HPI  Jose Kaplan is a 33 y.o. male with past medical history of schizoaffective disorder, cocaine abuse, GERD, DM2 who presents to the emergency department for evaluation of medication refill.  Patient requesting a refill of his Seroquel.  Patient was here last week and saw a provider who provided him with a weeks worth of Seroquel.  He reports that he is having trouble sleeping.  Per chart review, patient has multiple visits to the emergency department for medication request.  Additionally per chart review patient has missed several primary care visits including 2 days ago.  Patient does have a PCP appointment scheduled for this upcoming Monday.  Patient also requesting ginger ale and a lunch box and lotion for his dry skin.  The patient has no other acute complaints at this time.          REVIEW OF SYSTEMS   Review of Systems    See HPI. 12 point ROS performed, pertinent positives listed above otherwise negative  PAST MEDICAL AND SURGICAL HISTORY     Past Medical History:   Diagnosis Date    Anxiety     Arthritis     Asthma     Bipolar I disorder, most recent episode (or current) depressed, unspecified 9/12/2014    Clostridium difficile infection     COPD (chronic obstructive pulmonary disease) ([facility])     Depression     Disease of blood and blood forming organ     Eczema     Fracture, metacarpal     R 4th and 5th    Gastric ulcer     Gastritis 06/13/2018    GERD (gastroesophageal reflux disease)     GI bleed     H. 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HCC)

## 2024-05-15 NOTE — DISCHARGE INSTRUCTIONS
Thank you for visiting [facility] emergency department.  You can use melatonin which is over-the-counter to help with your sleep.    You need to go to see your Primary Doctor on Monday at 2:20 PM.

## 2024-05-15 NOTE — ED PROVIDER NOTES
I performed a history and physical examination of the patient and discussed management with the resident. I reviewed the resident’s note and agree with the documented findings and plan of care. Any areas of disagreement are noted on the chart. I was personally present for the key portions of any procedures. I have documented in the chart those procedures where I was not present during the key portions. I have reviewed the emergency nurses triage note. I agree with the chief complaint, past medical history, past surgical history, allergies, medications, social and family history as documented unless otherwise noted below. Documentation of the HPI, Physical Exam and Medical Decision Making performed by medical students or scribes is based on my personal performance of the HPI, PE and MDM. For Phys Assistant/ Nurse Practitioner cases/documentation I have personally evaluated this patient and have completed at least one if not all key elements of the E/M (history, physical exam, and MDM). My findings are as noted below.    In other words, I personally saw and examined the patient I have reviewed and agreed with the resident findings including all diagnostic interpretations and treatment plans as written.  I was present for the key portion of any procedures performed and the inclusive time noted in any critical care statement.    Patient presents again here today for medication refill.  Patient states that he needs his Seroquel he would like drinks a lunch block.  He also would like Phenergan.  I had a long talk with the patient.  He has missed several of his primary care physicians appointments.  I told him that we will give him several days worth of these medications, however he will need to follow-up as directed.  Patient understood and agreed with the plan.  Patient is subsequently discharged in stable condition.      No orders to display     Labs Reviewed - No data to display      Final diagnoses:   Dry skin

## 2024-05-15 NOTE — ED NOTES
Pt presents to the ER for a med refill. Pt states that he needs Seroquel and 2 drinks and a lunch box. Pt would also like if he could have phenergan.

## 2024-05-15 NOTE — ED TRIAGE NOTES
Pt presents to the ED with c/o needing a medication refill. Pt states he needs his Seroquel which he has been out of for 1 week.

## 2024-05-28 ENCOUNTER — HOSPITAL ENCOUNTER (EMERGENCY)
Age: 34
Discharge: HOME | End: 2024-05-28
Attending: EMERGENCY MEDICINE
Payer: MEDICARE

## 2024-05-28 VITALS
TEMPERATURE: 98.7 F | HEIGHT: 67 IN | WEIGHT: 180 LBS | OXYGEN SATURATION: 99 % | SYSTOLIC BLOOD PRESSURE: 143 MMHG | HEART RATE: 60 BPM | BODY MASS INDEX: 28.25 KG/M2 | DIASTOLIC BLOOD PRESSURE: 102 MMHG | RESPIRATION RATE: 17 BRPM

## 2024-05-28 DIAGNOSIS — Z76.0 ENCOUNTER FOR MEDICATION REFILL: Primary | ICD-10-CM

## 2024-05-28 PROCEDURE — 6370000000 HC RX 637 (ALT 250 FOR IP): Performed by: EMERGENCY MEDICINE

## 2024-05-28 PROCEDURE — 99283 EMERGENCY DEPT VISIT LOW MDM: CPT

## 2024-05-28 RX ORDER — ALPRAZOLAM 0.5 MG/1
0.5 TABLET ORAL ONCE
Status: COMPLETED | OUTPATIENT
Start: 2024-05-28 | End: 2024-05-28

## 2024-05-28 RX ORDER — IBUPROFEN 600 MG/1
600 TABLET ORAL 3 TIMES DAILY PRN
Qty: 30 TABLET | Refills: 0 | Status: SHIPPED | OUTPATIENT
Start: 2024-05-28

## 2024-05-28 RX ORDER — ALPRAZOLAM 0.5 MG/1
0.5 TABLET ORAL NIGHTLY PRN
Qty: 14 TABLET | Refills: 0 | Status: SHIPPED | OUTPATIENT
Start: 2024-05-28 | End: 2024-06-11

## 2024-05-28 RX ORDER — CYCLOBENZAPRINE HCL 10 MG
10 TABLET ORAL 3 TIMES DAILY PRN
Qty: 30 TABLET | Refills: 0 | Status: SHIPPED | OUTPATIENT
Start: 2024-05-28 | End: 2024-06-07

## 2024-05-28 RX ORDER — QUETIAPINE FUMARATE 200 MG/1
200 TABLET, FILM COATED ORAL ONCE
Status: COMPLETED | OUTPATIENT
Start: 2024-05-28 | End: 2024-05-28

## 2024-05-28 RX ORDER — QUETIAPINE FUMARATE 100 MG/1
200 TABLET, FILM COATED ORAL NIGHTLY
Qty: 28 TABLET | Refills: 3 | Status: SHIPPED | OUTPATIENT
Start: 2024-05-28

## 2024-05-28 RX ADMIN — ALPRAZOLAM 0.5 MG: 0.5 TABLET ORAL at 23:11

## 2024-05-28 RX ADMIN — QUETIAPINE FUMARATE 200 MG: 200 TABLET ORAL at 23:11

## 2024-05-29 ENCOUNTER — APPOINTMENT (OUTPATIENT)
Dept: GENERAL RADIOLOGY | Age: 34
End: 2024-05-29
Payer: MEDICARE

## 2024-05-29 ENCOUNTER — HOSPITAL ENCOUNTER (EMERGENCY)
Age: 34
Discharge: HOME | End: 2024-05-30
Attending: EMERGENCY MEDICINE
Payer: MEDICARE

## 2024-05-29 ENCOUNTER — HOSPITAL ENCOUNTER (EMERGENCY)
Age: 34
Discharge: HOME | End: 2024-05-29
Attending: EMERGENCY MEDICINE
Payer: MEDICARE

## 2024-05-29 VITALS
DIASTOLIC BLOOD PRESSURE: 51 MMHG | HEART RATE: 72 BPM | OXYGEN SATURATION: 97 % | SYSTOLIC BLOOD PRESSURE: 99 MMHG | RESPIRATION RATE: 18 BRPM

## 2024-05-29 DIAGNOSIS — S89.91XA INJURY OF RIGHT KNEE, INITIAL ENCOUNTER: Primary | ICD-10-CM

## 2024-05-29 DIAGNOSIS — Z76.0 ENCOUNTER FOR MEDICATION REFILL: Primary | ICD-10-CM

## 2024-05-29 PROCEDURE — 99282 EMERGENCY DEPT VISIT SF MDM: CPT

## 2024-05-29 PROCEDURE — 6370000000 HC RX 637 (ALT 250 FOR IP): Performed by: EMERGENCY MEDICINE

## 2024-05-29 PROCEDURE — 73562 X-RAY EXAM OF KNEE 3: CPT

## 2024-05-29 PROCEDURE — 99283 EMERGENCY DEPT VISIT LOW MDM: CPT

## 2024-05-29 RX ORDER — IBUPROFEN 800 MG/1
800 TABLET ORAL ONCE
Status: COMPLETED | OUTPATIENT
Start: 2024-05-29 | End: 2024-05-29

## 2024-05-29 RX ADMIN — IBUPROFEN 800 MG: 800 TABLET ORAL at 01:07

## 2024-05-29 ASSESSMENT — PAIN SCALES - GENERAL: PAINLEVEL_OUTOF10: 5

## 2024-05-29 ASSESSMENT — PAIN - FUNCTIONAL ASSESSMENT: PAIN_FUNCTIONAL_ASSESSMENT: 0-10

## 2024-05-29 NOTE — ED PROVIDER NOTES
EMERGENCY DEPARTMENT ENCOUNTER    Pt Name: Jose Kaplan  MRN: [id number]  Birthdate 1990  Date of evaluation: 5/29/24  CHIEF COMPLAINT       Chief Complaint   Patient presents with    Fall     Fell at bus stop tonight, reports R knee pain     HISTORY OF PRESENT ILLNESS   HPI  33M hx of biplar, frequent ED visits presents to the ED for R knee pain after fall earlier this evening. Pt states fell directly on his R knee, no injury to any other part of his body, he is still able to ambulate.    REVIEW OF SYSTEMS     Review of Systems   All other systems reviewed and are negative.    PASTMEDICAL HISTORY     Past Medical History:   Diagnosis Date    Anxiety     Arthritis     Asthma     Bipolar I disorder, most recent episode (or current) depressed, unspecified 9/12/2014    Clostridium difficile infection     COPD (chronic obstructive pulmonary disease) (HCC)     Depression     Disease of blood and blood forming organ     Eczema     Fracture, metacarpal     R 4th and 5th    Gastric ulcer     Gastritis 06/13/2018    GERD (gastroesophageal reflux disease)     GI bleed     H. 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HCC)     PUD (peptic ulcer disease)     Rheumatoid arthritis (HCC)     Rheumatoid arthritis(714.0)     Severe recurrent major depression without psychotic features ([facility]) 11/21/2021    Sleep apnea     Still's disease (HCC)     Substance abuse (HCC)     Hx of opiates, benzos, cocaine, alcohol abuse    Suicidal ideation     Suicide attempt by hanging (HCC)     Tobacco dependence     Ulcerative colitis (HCC)     UTI (urinary tract infection)      SURGICAL HISTORY       Past Surgical History:   Procedure Laterality Date    ABDOMEN SURGERY      upper GI scope 7/7/2015    BRONCHOSCOPY      CHOLECYSTECTOMY, LAPAROSCOPIC  07/14/2017    surgery performed at [facility]    COLONOSCOPY       History     Tobacco Use    Smoking status: Every Day     Current packs/day: 0.50     Average packs/day: 0.5 packs/day for 15.0 years (7.5 ttl pk-yrs)     Types: Cigarettes    Smokeless tobacco: Never   Vaping Use    Vaping Use: Every day    Substances: Nicotine   Substance Use Topics    Alcohol use: Not Currently     Comment: drinks daily    Drug use: Not Currently     Types: Cocaine     Comment: Hx of opiates, benzos, cocaine, alcohol abuse     PHYSICAL EXAM     INITIAL VITALS: BP (!) 99/51   Pulse 72   Resp 18   SpO2 97%    Physical Exam  Constitutional:       General: He is not in acute distress.     Appearance: Normal appearance. He is normal weight. He is not ill-appearing.   HENT:      Head: Normocephalic and atraumatic.      Nose: Nose normal. No rhinorrhea.      Mouth/Throat:      Mouth: Mucous membranes are moist.      Pharynx: No oropharyngeal exudate or posterior oropharyngeal erythema.   Eyes:      Extraocular Movements: Extraocular movements intact.      Conjunctiva/sclera: Conjunctivae normal.      Pupils: Pupils are equal, round, and reactive to light.   Cardiovascular:      Rate and Rhythm: Normal rate and regular rhythm.      Pulses: Normal pulses.      Heart sounds: Normal heart sounds. No murmur heard.  Pulmonary:      Effort: Pulmonary effort is normal. No respiratory distress.      Breath sounds: Normal breath sounds. No wheezing or rhonchi.   Abdominal:      General: Bowel sounds are normal.      Palpations: Abdomen is soft. There is no mass.      Tenderness: There is no abdominal tenderness. There is no right CVA tenderness, left CVA tenderness, guarding or rebound.   Musculoskeletal:         General: Normal range of motion.      Cervical back: No rigidity.      Comments: Mild tenderness to R knee, no swelling   Skin:     General: Skin is warm and dry.      Capillary Refill: Capillary refill takes less than 2 seconds.   Neurological:      General: No focal deficit present.      Mental

## 2024-05-29 NOTE — ED PROVIDER NOTES
HISTORY       Past Surgical History:   Procedure Laterality Date    ABDOMEN SURGERY      upper GI scope 7/7/2015    BRONCHOSCOPY      CHOLECYSTECTOMY, LAPAROSCOPIC  07/14/2017    surgery performed at [facility]    COLONOSCOPY      ENDOSCOPY, COLON, DIAGNOSTIC      OTHER SURGICAL HISTORY      lumbar puncture    ID COLONOSCOPY W/BIOPSY SINGLE/MULTIPLE  8/9/2017    COLONOSCOPY WITH BIOPSY performed by Priyanka Mojica MD at [facility]    ID EGD TRANSORAL BIOPSY SINGLE/MULTIPLE N/A 3/20/2017    EGD BIOPSY performed by Jacobo Villalobos MD at [facility]    ID ESOPHAGOGASTRODUODENOSCOPY TRANSORAL DIAGNOSTIC N/A 8/9/2017    EGD ESOPHAGOGASTRODUODENOSCOPY performed by Priyanka Mojica MD at [facility]    SIGMOIDOSCOPY N/A 3/20/2017    SIGMOIDOSCOPY DIAGNOSTIC FLEXIBLE performed by Jacobo Villalobos MD at [facility]    UPPER GASTROINTESTINAL ENDOSCOPY  2/4/16    UPPER GASTROINTESTINAL ENDOSCOPY N/A 6/13/2018    GASTRITIS    UPPER GASTROINTESTINAL ENDOSCOPY N/A 9/20/2018    EGD BIOPSY performed by Marianne Brooke MD at [facility]     CURRENT MEDICATIONS       Discharge Medication List as of 5/28/2024 11:09 PM        CONTINUE these medications which have NOT CHANGED    Details   diphenhydrAMINE (BENADRYL) 25 MG capsule Take 1 capsule by mouth nightly as needed for Itching or Sleep, Disp-1 capsule, R-0Normal      !! QUEtiapine (SEROQUEL) 100 MG tablet Take 2 tablets by mouth nightly, Disp-10 tablet, R-0Normal      sertraline (ZOLOFT) 25 MG tablet Take 1 tablet by mouth daily, Disp-30 tablet, R-3Normal      traZODone (DESYREL) 50 MG tablet Take 1 tablet by mouth nightly as needed for Sleep, Disp-30 tablet, R-0Normal      acetaminophen (TYLENOL) 500 MG tablet Take 2 tablets by mouth 3 times daily as needed for Pain, Disp-180 tablet, R-0Print      clotrimazole-betamethasone (LOTRISONE) 1-0.05 % cream Apply topically 2 times daily to feet., Disp-45 g, R-0, Print      ondansetron (ZOFRAN-ODT)

## 2024-05-30 VITALS
OXYGEN SATURATION: 97 % | SYSTOLIC BLOOD PRESSURE: 115 MMHG | DIASTOLIC BLOOD PRESSURE: 67 MMHG | RESPIRATION RATE: 18 BRPM | TEMPERATURE: 97.9 F | HEART RATE: 78 BPM

## 2024-05-30 VITALS
HEART RATE: 80 BPM | TEMPERATURE: 98.5 F | SYSTOLIC BLOOD PRESSURE: 124 MMHG | OXYGEN SATURATION: 94 % | DIASTOLIC BLOOD PRESSURE: 93 MMHG | RESPIRATION RATE: 15 BRPM

## 2024-05-30 PROCEDURE — 99283 EMERGENCY DEPT VISIT LOW MDM: CPT

## 2024-05-30 ASSESSMENT — ENCOUNTER SYMPTOMS
VOMITING: 0
SORE THROAT: 0
COLOR CHANGE: 0
NAUSEA: 0
COUGH: 0
SHORTNESS OF BREATH: 0
EYE REDNESS: 0
RHINORRHEA: 0
EYE DISCHARGE: 0
DIARRHEA: 0

## 2024-05-30 NOTE — ED PROVIDER NOTES
benzos, cocaine, alcohol abuse     PHYSICAL EXAM     INITIAL VITALS: There were no vitals taken for this visit.   Physical Exam  Constitutional:       Appearance: Normal appearance.   HENT:      Head: Normocephalic and atraumatic.   Eyes:      Extraocular Movements: Extraocular movements intact.      Pupils: Pupils are equal, round, and reactive to light.   Cardiovascular:      Rate and Rhythm: Normal rate and regular rhythm.   Pulmonary:      Effort: Pulmonary effort is normal.      Breath sounds: Normal breath sounds.   Abdominal:      General: Abdomen is flat.      Palpations: Abdomen is soft.      Tenderness: There is no abdominal tenderness.   Neurological:      Mental Status: He is alert.   Psychiatric:         Thought Content: Thought content does not include homicidal or suicidal ideation.         MEDICAL DECISION MAKING / ED COURSE:   Summary of Patient Presentation:      33-year-old presents with complaints of anxiety.  Plan is discharge, the patient has a history of Xanax abuse, as well as other drug abuse.  He appears very relaxed, he was able to sleep in the emergency department for a number of hours.  At this time I do not believe he requires further treatment for anxiety.    Shared Decision Making: The patient was involved in his/her plan of care through shared decision making. The testing that was ordered was discussed with the patient. Any medications that may have been ordered were discussed with the patient    Code Status Discussion:      \"ED Course\" Notes From Epic Narrator:         CRITICAL CARE:       PROCEDURES:    Procedures      DATA FOR LAB AND RADIOLOGY TESTS ORDERED BELOW ARE REVIEWED BY THE ED CLINICIAN:    RADIOLOGY: All x-rays, CT, MRI, and formal ultrasound images (except ED bedside ultrasound) are read by the radiologist, see reports below, unless otherwise noted in MDM or here.  Reports below are reviewed by myself.  No orders to display       LABS: Lab orders shown below, the  results are reviewed by myself, and all abnormals are listed below.  Labs Reviewed - No data to display    Vitals Reviewed:  There were no vitals filed for this visit.  MEDICATIONS GIVEN TO PATIENT THIS ENCOUNTER:  No orders of the defined types were placed in this encounter.    DISCHARGE PRESCRIPTIONS:  New Prescriptions    No medications on file     PHYSICIAN CONSULTS ORDERED THIS ENCOUNTER:  None  FINAL IMPRESSION      1. Encounter for medication refill          DISPOSITION/PLAN   DISPOSITION Decision To Discharge 05/30/2024 03:53:42 AM      OUTPATIENT FOLLOW UP THE PATIENT:  [place]  [address] [facility] [address]  [phone]    Schedule an appointment as soon as possible for a visit in 2 days        Elroy You MD        This note was created with the assistance of a speech-recognition program. While intending to generate a document that actually reflects the content of the visit, no guarantees can be provided that every mistake has been identified and corrected by editing.        Elroy You MD  05/30/24 0359

## 2024-05-31 ENCOUNTER — HOSPITAL ENCOUNTER (EMERGENCY)
Age: 34
Discharge: HOME | End: 2024-05-31
Attending: EMERGENCY MEDICINE
Payer: MEDICARE

## 2024-05-31 VITALS
HEIGHT: 67 IN | HEART RATE: 104 BPM | DIASTOLIC BLOOD PRESSURE: 101 MMHG | OXYGEN SATURATION: 96 % | RESPIRATION RATE: 18 BRPM | SYSTOLIC BLOOD PRESSURE: 150 MMHG | BODY MASS INDEX: 28.25 KG/M2 | WEIGHT: 180 LBS

## 2024-05-31 DIAGNOSIS — M79.604 RIGHT LEG PAIN: Primary | ICD-10-CM

## 2024-05-31 PROCEDURE — 6370000000 HC RX 637 (ALT 250 FOR IP): Performed by: EMERGENCY MEDICINE

## 2024-05-31 PROCEDURE — 99283 EMERGENCY DEPT VISIT LOW MDM: CPT

## 2024-05-31 RX ORDER — IBUPROFEN 400 MG/1
400 TABLET ORAL ONCE
Status: COMPLETED | OUTPATIENT
Start: 2024-05-31 | End: 2024-05-31

## 2024-05-31 RX ORDER — CYCLOBENZAPRINE HCL 10 MG
10 TABLET ORAL ONCE
Status: COMPLETED | OUTPATIENT
Start: 2024-05-31 | End: 2024-05-31

## 2024-05-31 RX ADMIN — CYCLOBENZAPRINE 10 MG: 10 TABLET, FILM COATED ORAL at 01:13

## 2024-05-31 RX ADMIN — IBUPROFEN 400 MG: 400 TABLET, FILM COATED ORAL at 01:13

## 2024-05-31 ASSESSMENT — ENCOUNTER SYMPTOMS
NAUSEA: 0
VOMITING: 0
COLOR CHANGE: 0
TROUBLE SWALLOWING: 0
ABDOMINAL PAIN: 0
COUGH: 0
PHOTOPHOBIA: 0
SHORTNESS OF BREATH: 0
DIARRHEA: 0

## 2024-05-31 NOTE — ED PROVIDER NOTES
EMERGENCY DEPARTMENT ENCOUNTER    Pt Name: Jose Kaplan  MRN: [id number]  Birthdate 1990  Date of evaluation: 5/31/24  CHIEF COMPLAINT       Chief Complaint   Patient presents with    Pain     Bilateral hands and feet. No new injury or falls.      HISTORY OF PRESENT ILLNESS   33-year-old male presenting to the ER complaining of right lower extremity pain.  Patient was recently in the ER for similar complaint.  Prescriptions have been sent by previous visits but the patient has not yet been able to pick them up from the pharmacy.  Patient is hoping for some temporary relief with pain medication.    The history is provided by the patient.   Leg Injury  Location:  Leg  Time since incident:  2 days  Injury: yes    Mechanism of injury: fall    Leg location:  R lower leg  Associated symptoms: no fatigue and no fever            REVIEW OF SYSTEMS     Review of Systems   Constitutional:  Negative for activity change, fatigue and fever.   HENT:  Negative for congestion, ear pain and trouble swallowing.    Eyes:  Negative for photophobia and visual disturbance.   Respiratory:  Negative for cough and shortness of breath.    Cardiovascular:  Negative for chest pain and palpitations.   Gastrointestinal:  Negative for abdominal pain, diarrhea, nausea and vomiting.   Genitourinary:  Negative for dysuria, flank pain and urgency.   Musculoskeletal:  Positive for arthralgias (RLE). Negative for myalgias.   Skin:  Negative for color change and rash.   Neurological:  Negative for dizziness and facial asymmetry.   Psychiatric/Behavioral:  Negative for agitation and behavioral problems.      PASTMEDICAL HISTORY     Past Medical History:   Diagnosis Date    Anxiety     Arthritis     Asthma     Bipolar I disorder, most recent episode (or current) depressed, unspecified 9/12/2014    Clostridium difficile infection     COPD (chronic obstructive pulmonary disease) (HCC)     Depression     Disease of blood and blood forming organ

## 2024-06-01 ENCOUNTER — HOSPITAL ENCOUNTER (EMERGENCY)
Age: 34
Discharge: HOME | End: 2024-06-01
Attending: EMERGENCY MEDICINE
Payer: MEDICARE

## 2024-06-01 DIAGNOSIS — M79.604 PAIN OF RIGHT LOWER EXTREMITY: Primary | ICD-10-CM

## 2024-06-01 PROCEDURE — 6370000000 HC RX 637 (ALT 250 FOR IP): Performed by: EMERGENCY MEDICINE

## 2024-06-01 RX ORDER — CYCLOBENZAPRINE HCL 10 MG
10 TABLET ORAL ONCE
Status: COMPLETED | OUTPATIENT
Start: 2024-06-01 | End: 2024-06-01

## 2024-06-01 RX ORDER — ACETAMINOPHEN 325 MG/1
650 TABLET ORAL ONCE
Status: COMPLETED | OUTPATIENT
Start: 2024-06-01 | End: 2024-06-01

## 2024-06-01 RX ADMIN — ACETAMINOPHEN 650 MG: 325 TABLET ORAL at 01:29

## 2024-06-01 RX ADMIN — CYCLOBENZAPRINE 10 MG: 10 TABLET, FILM COATED ORAL at 01:29

## 2024-06-01 ASSESSMENT — ENCOUNTER SYMPTOMS
NAUSEA: 0
COLOR CHANGE: 0
ABDOMINAL PAIN: 0
SHORTNESS OF BREATH: 0
COUGH: 0
PHOTOPHOBIA: 0
TROUBLE SWALLOWING: 0
DIARRHEA: 0
VOMITING: 0

## 2024-06-01 ASSESSMENT — PAIN DESCRIPTION - DESCRIPTORS: DESCRIPTORS: DISCOMFORT

## 2024-06-01 ASSESSMENT — PAIN DESCRIPTION - ORIENTATION: ORIENTATION: RIGHT;LEFT

## 2024-06-01 ASSESSMENT — PAIN SCALES - GENERAL: PAINLEVEL_OUTOF10: 7

## 2024-06-01 ASSESSMENT — PAIN DESCRIPTION - LOCATION: LOCATION: HAND;FOOT

## 2024-06-01 NOTE — ED PROVIDER NOTES
EMERGENCY DEPARTMENT ENCOUNTER    Pt Name: Jose Kaplan  MRN: [id number]  Birthdate 1990  Date of evaluation: 6/1/24  CHIEF COMPLAINT       Chief Complaint   Patient presents with    Foot Pain    Leg Pain     Due to falling the other day. Pt states he hopes the drBonifacio Can help him get some rest.     HISTORY OF PRESENT ILLNESS   33-year-old male presenting to the ER complaining of right lower extremity pain.  Patient has been to the ER multiple times in the last few days for the same issue.    The history is provided by the patient.   Leg Injury  Location:  Leg  Leg location:  R lower leg  Pain details:     Quality:  Aching  Associated symptoms: no fatigue and no fever            REVIEW OF SYSTEMS     Review of Systems   Constitutional:  Negative for activity change, fatigue and fever.   HENT:  Negative for congestion, ear pain and trouble swallowing.    Eyes:  Negative for photophobia and visual disturbance.   Respiratory:  Negative for cough and shortness of breath.    Cardiovascular:  Negative for chest pain and palpitations.   Gastrointestinal:  Negative for abdominal pain, diarrhea, nausea and vomiting.   Genitourinary:  Negative for dysuria, flank pain and urgency.   Musculoskeletal:  Positive for arthralgias (RLE). Negative for myalgias.   Skin:  Negative for color change and rash.   Neurological:  Negative for dizziness and facial asymmetry.   Psychiatric/Behavioral:  Negative for agitation and behavioral problems.      PASTMEDICAL HISTORY     Past Medical History:   Diagnosis Date    Anxiety     Arthritis     Asthma     Bipolar I disorder, most recent episode (or current) depressed, unspecified 9/12/2014    Clostridium difficile infection     COPD (chronic obstructive pulmonary disease) ([facility])     Depression     Disease of blood and blood forming organ     Eczema     Fracture, metacarpal     R 4th and 5th    Gastric ulcer     Gastritis 06/13/2018    GERD (gastroesophageal reflux disease)     GI bleed      Patient would not allow us to take a temperature.  Patient understands and agrees with the plan.    CRITICAL CARE:       PROCEDURES:    Procedures      DATA FOR LAB AND RADIOLOGY TESTS ORDERED BELOW ARE REVIEWED BY THE ED CLINICIAN:    RADIOLOGY: All x-rays, CT, MRI, and formal ultrasound images (except ED bedside ultrasound) are read by the radiologist, see reports below, unless otherwise noted in MDM or here.  Reports below are reviewed by myself.  No orders to display       LABS: Lab orders shown below, the results are reviewed by myself, and all abnormals are listed below.  Labs Reviewed - No data to display    Vitals Reviewed:    Vitals:    05/31/24 2236 05/31/24 2239   BP:  (!) 150/101   Pulse: (!) 104    Resp: 18    SpO2: 96%    Weight: 81.6 kg (180 lb)    Height: 1.702 m (5' 7\")      MEDICATIONS GIVEN TO PATIENT THIS ENCOUNTER:  Orders Placed This Encounter   Medications    acetaminophen (TYLENOL) tablet 650 mg    cyclobenzaprine (FLEXERIL) tablet 10 mg     DISCHARGE PRESCRIPTIONS:  New Prescriptions    No medications on file     PHYSICIAN CONSULTS ORDERED THIS ENCOUNTER:  None  FINAL IMPRESSION      1. Pain of right lower extremity          DISPOSITION/PLAN   DISPOSITION Decision To Discharge 06/01/2024 01:04:45 AM      OUTPATIENT FOLLOW UP THE PATIENT:  PCP  [id number]  Schedule an appointment as soon as possible for a visit in 2 days      [facility] ED  [address] [place] [address]  [phone]  Go to   As needed, If symptoms worsen      DO Sandra Rg Sami, DO  06/01/24 0384

## 2024-06-01 NOTE — ED NOTES
Pt arrived to ed with c/o bilateral hand and leg pain. Pt frequents ED's around the city multiple times a day. Pt denies any known injury or trauma. Pt A&Ox3.

## 2024-06-24 ENCOUNTER — HOSPITAL ENCOUNTER (EMERGENCY)
Age: 34
Discharge: HOME | End: 2024-06-24
Payer: MEDICARE

## 2024-06-24 VITALS
RESPIRATION RATE: 18 BRPM | HEIGHT: 68 IN | OXYGEN SATURATION: 98 % | HEART RATE: 103 BPM | WEIGHT: 180 LBS | BODY MASS INDEX: 27.28 KG/M2 | DIASTOLIC BLOOD PRESSURE: 88 MMHG | SYSTOLIC BLOOD PRESSURE: 136 MMHG

## 2024-06-24 DIAGNOSIS — Z76.0 ENCOUNTER FOR MEDICATION REFILL: Primary | ICD-10-CM

## 2024-06-24 PROCEDURE — 99283 EMERGENCY DEPT VISIT LOW MDM: CPT

## 2024-06-24 RX ORDER — TIZANIDINE HYDROCHLORIDE 2 MG/1
2 CAPSULE, GELATIN COATED ORAL 3 TIMES DAILY
Qty: 20 CAPSULE | Refills: 0 | Status: SHIPPED | OUTPATIENT
Start: 2024-06-24

## 2024-06-24 RX ORDER — QUETIAPINE FUMARATE 100 MG/1
200 TABLET, FILM COATED ORAL NIGHTLY
Qty: 28 TABLET | Refills: 0 | Status: SHIPPED | OUTPATIENT
Start: 2024-06-24

## 2024-06-24 ASSESSMENT — PAIN - FUNCTIONAL ASSESSMENT: PAIN_FUNCTIONAL_ASSESSMENT: NONE - DENIES PAIN

## 2024-06-24 NOTE — ED PROVIDER NOTES
[facility] EMERGENCY DEPT      EMERGENCY MEDICINE     Pt Name: Jose Kaplan  MRN: [id number]  Birthdate 1990  Date of evaluation: 6/24/2024  Provider: DINA Goode    CHIEF COMPLAINT       Chief Complaint   Patient presents with    Medication Refill     HISTORY OF PRESENT ILLNESS   Jose Kaplan is a pleasant 33 y.o. male who presents to the emergency department from from home, by private vehicle for evaluation of medication refill.  He is requesting refills on Xanax and Seroquel.  He is otherwise no complaints.  He is feels a little anxious today but not HI or SI.  He just wants his medication refilled.  He is currently refusing a temperature.          PASTMEDICAL HISTORY     Past Medical History:   Diagnosis Date    Anxiety     Arthritis     Asthma     Bipolar I disorder, most recent episode (or current) depressed, unspecified 9/12/2014    Clostridium difficile infection     COPD (chronic obstructive pulmonary disease) ([facility])     Depression     Disease of blood and blood forming organ     Eczema     Fracture, metacarpal     R 4th and 5th    Gastric ulcer     Gastritis 06/13/2018    GERD (gastroesophageal reflux disease)     GI bleed     H. 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[facility])     Substance abuse ([facility])     Hx of opiates, benzos, cocaine, alcohol abuse    Suicidal ideation     Suicide attempt by hanging ([facility])     Tobacco dependence     Ulcerative colitis ([facility])     UTI (urinary tract infection)        Patient Active Problem List   Diagnosis Code    Opioid abuse ([facility]) F11.10    Noncompliance Z91.199    Rectal bleeding K62.5    Smoker F17.200     From: Lidia Hunt  To: Kimberly Adorno  Marisa

## 2024-06-24 NOTE — ED NOTES
Patient presents to the ED wanting his medications refilled. He wants Zanaflex and Seroquel refilled. He denies pain at this time.

## 2024-06-27 ENCOUNTER — APPOINTMENT (OUTPATIENT)
Dept: GENERAL RADIOLOGY | Age: 34
End: 2024-06-27
Payer: MEDICARE

## 2024-06-27 ENCOUNTER — HOSPITAL ENCOUNTER (EMERGENCY)
Age: 34
Discharge: HOME | End: 2024-06-27
Payer: MEDICARE

## 2024-06-27 VITALS
RESPIRATION RATE: 18 BRPM | HEART RATE: 122 BPM | SYSTOLIC BLOOD PRESSURE: 167 MMHG | TEMPERATURE: 98.3 F | OXYGEN SATURATION: 96 % | DIASTOLIC BLOOD PRESSURE: 104 MMHG

## 2024-06-27 DIAGNOSIS — Z76.0 ENCOUNTER FOR MEDICATION REFILL: Primary | ICD-10-CM

## 2024-06-27 PROCEDURE — 99283 EMERGENCY DEPT VISIT LOW MDM: CPT

## 2024-06-27 RX ORDER — QUETIAPINE FUMARATE 100 MG/1
200 TABLET, FILM COATED ORAL NIGHTLY
Qty: 10 TABLET | Refills: 0 | Status: SHIPPED | OUTPATIENT
Start: 2024-06-27 | End: 2024-07-02

## 2024-06-27 RX ORDER — QUETIAPINE FUMARATE 100 MG/1
200 TABLET, FILM COATED ORAL NIGHTLY
Qty: 6 TABLET | Refills: 0 | Status: SHIPPED | OUTPATIENT
Start: 2024-06-27 | End: 2024-06-27

## 2024-06-27 ASSESSMENT — PAIN - FUNCTIONAL ASSESSMENT: PAIN_FUNCTIONAL_ASSESSMENT: NONE - DENIES PAIN

## 2024-06-27 NOTE — ED NOTES
X ray tech stating pt refusing x ray. This nurse attempted to get EKG at this time. He is refusing. He states he wants to be discharged and wants a cab. Tricia ARROYO notified. Patient discharged. Cab called.

## 2024-06-27 NOTE — DISCHARGE INSTRUCTIONS
I have created a primary care appointment for you on July 8, the co-pay $0.  Please follow-up with them concerning medication and any further needs.    If you can get into [facility] or [facility] faster than that please do.     Return to the emergency department if you develop chest pain, vision changed, hallucination, suicidal ideations, homicidal ideations, or any other concern.

## 2024-06-27 NOTE — ED TRIAGE NOTES
Pt to the ED for medication refill and something to eat/drink. Pt states he has been feeling fatigued. Pt refusing IV, blood work and EKG at this time.

## 2024-06-27 NOTE — ED PROVIDER NOTES
[facility] EMERGENCY DEPT      EMERGENCY MEDICINE     Pt Name: Jose Kaplan  MRN: [id number]  Birthdate 1990  Date of evaluation: 6/27/2024  Provider: Tricia Guy PA-C    CHIEF COMPLAINT       Chief Complaint   Patient presents with    Medication Refill     HISTORY OF PRESENT ILLNESS   Jose Kaplan is a pleasant 33 y.o. male who presents to the emergency department from from home, by private vehicle for evaluation of medication refill.  Patient states he needs a refill on his Seroquel, he has been out for a few days. Patient endorses fatigue patient is refusing any other workup at this time.  Denies HI and SI.     When I went to discharge the patient, staff was taking him out to the lobby, when he started endorsing dizziness and wished to be treated and have a workup at this time.  I ordered labs and basic workup, when patient refused again.    Patient was discharged for the second time without any workup or treatment.    PASTMEDICAL HISTORY     Past Medical History:   Diagnosis Date    Anxiety     Arthritis     Asthma     Bipolar I disorder, most recent episode (or current) depressed, unspecified 9/12/2014    Clostridium difficile infection     COPD (chronic obstructive pulmonary disease) (HCC)     Depression     Disease of blood and blood forming organ     Eczema     Fracture, metacarpal     R 4th and 5th    Gastric ulcer     Gastritis 06/13/2018    GERD (gastroesophageal reflux disease)     GI bleed     H. 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

## 2024-06-28 NOTE — ED NOTES
CM uploaded 142 to CP.    12:24 PM  Per Judie at [facility], and Karlee at [facility], pt is accepted at both these facilities.      CM spoke with pt in room to discuss NH placement; informed that son has agreed to NH placement, she wants to know why, CM instructed that it is because of her mental status.  Pt requested that CM ask her son to come speak with her.    CM called pt's son Jose Alejandro [phone], informed that pt is requesting that he come speak with her.  Jose Alejandro states he's at [facility] with his wife, she is getting a procedure, he can't come today.  He states he can probably come tomorrow.    MEGHAN CantuN, BS, RN, [facility]       Pt requesting a wheelchair, writer informed pt that he needs to ambulate in order to be DC'ed back to intermediate. Pt stating that he cannot walk but wants to be DC'ed, writer informed pt that he could not be DC'ed if he cannot ambulate. Dr. Cristian Winston at bedside reinforcing the need to ambulate in order to be DC'ed, pt refusing to ambulate. Pt attempting to stand and is not able to for longer than a few seconds and then sits back on the stretcher. Pt adamant that he is DC'ed back to intermediate, educated again that he needs to ambulate on his own. Pt now agreeable to have Xray's and lab work completed.      Louis Ledesma, RN  06/17/23 [id number]

## 2024-07-04 ENCOUNTER — HOSPITAL ENCOUNTER (EMERGENCY)
Age: 34
Discharge: HOME | End: 2024-07-04
Attending: EMERGENCY MEDICINE
Payer: MEDICARE

## 2024-07-04 VITALS
HEART RATE: 96 BPM | BODY MASS INDEX: 27.37 KG/M2 | RESPIRATION RATE: 17 BRPM | DIASTOLIC BLOOD PRESSURE: 90 MMHG | OXYGEN SATURATION: 98 % | WEIGHT: 180 LBS | SYSTOLIC BLOOD PRESSURE: 126 MMHG | TEMPERATURE: 99.7 F

## 2024-07-04 DIAGNOSIS — Z76.0 ENCOUNTER FOR MEDICATION REFILL: ICD-10-CM

## 2024-07-04 DIAGNOSIS — M79.641 PAIN IN BOTH HANDS: Primary | ICD-10-CM

## 2024-07-04 DIAGNOSIS — M79.642 PAIN IN BOTH HANDS: Primary | ICD-10-CM

## 2024-07-04 PROCEDURE — 99283 EMERGENCY DEPT VISIT LOW MDM: CPT

## 2024-07-04 PROCEDURE — 6370000000 HC RX 637 (ALT 250 FOR IP): Performed by: NURSE PRACTITIONER

## 2024-07-04 RX ORDER — CYCLOBENZAPRINE HCL 10 MG
10 TABLET ORAL ONCE
Status: COMPLETED | OUTPATIENT
Start: 2024-07-04 | End: 2024-07-04

## 2024-07-04 RX ORDER — IBUPROFEN 800 MG/1
800 TABLET ORAL ONCE
Status: COMPLETED | OUTPATIENT
Start: 2024-07-04 | End: 2024-07-04

## 2024-07-04 RX ADMIN — CYCLOBENZAPRINE 10 MG: 10 TABLET, FILM COATED ORAL at 18:30

## 2024-07-04 RX ADMIN — IBUPROFEN 800 MG: 800 TABLET ORAL at 18:29

## 2024-07-04 ASSESSMENT — PAIN DESCRIPTION - LOCATION: LOCATION: HAND;FOOT

## 2024-07-04 ASSESSMENT — PAIN SCALES - GENERAL: PAINLEVEL_OUTOF10: 4

## 2024-07-04 ASSESSMENT — PAIN DESCRIPTION - FREQUENCY: FREQUENCY: CONTINUOUS

## 2024-07-04 ASSESSMENT — PAIN - FUNCTIONAL ASSESSMENT: PAIN_FUNCTIONAL_ASSESSMENT: 0-10

## 2024-07-04 ASSESSMENT — PAIN DESCRIPTION - ORIENTATION: ORIENTATION: LEFT;RIGHT

## 2024-07-04 ASSESSMENT — PAIN DESCRIPTION - DESCRIPTORS: DESCRIPTORS: DISCOMFORT

## 2024-07-04 NOTE — DISCHARGE INSTRUCTIONS
Call [phone] ([phone]) to establish care for follow up.  You can also call [employer] at [phone] to establish care.

## 2024-07-04 NOTE — ED PROVIDER NOTES
eMERGENCY dEPARTMENT eNCOUnter   Independent Attestation     Pt Name: Jose Kaplan  MRN: [id number]  Birthdate 1990  Date of evaluation: 7/4/24     Jose Kaplan is a 33 y.o. male with CC: Rash (Hands/feet) and Medication Refill (Motrin/seroquel )        This visit was performed by both a physician and an APC. I performed all aspects of the MDM as documented.      Chrissie Estes MD  Attending Emergency Physician            Chrissie Estes MD  07/04/24 [id number]    
CNP      Chhaya Frey, APRN - CNP  07/05/24 [id number]

## 2024-07-05 ASSESSMENT — ENCOUNTER SYMPTOMS
SHORTNESS OF BREATH: 0
COLOR CHANGE: 0

## 2024-07-10 ENCOUNTER — HOSPITAL ENCOUNTER (EMERGENCY)
Age: 34
Discharge: HOME | End: 2024-07-10
Attending: EMERGENCY MEDICINE
Payer: MEDICARE

## 2024-07-10 VITALS
DIASTOLIC BLOOD PRESSURE: 98 MMHG | SYSTOLIC BLOOD PRESSURE: 143 MMHG | OXYGEN SATURATION: 97 % | BODY MASS INDEX: 28.25 KG/M2 | HEART RATE: 101 BPM | WEIGHT: 180 LBS | RESPIRATION RATE: 18 BRPM | HEIGHT: 67 IN

## 2024-07-10 DIAGNOSIS — F23 ACUTE PSYCHOSIS (HCC): ICD-10-CM

## 2024-07-10 DIAGNOSIS — Z76.0 ENCOUNTER FOR MEDICATION REFILL: Primary | ICD-10-CM

## 2024-07-10 DIAGNOSIS — F32.A DEPRESSION WITH SUICIDAL IDEATION: ICD-10-CM

## 2024-07-10 DIAGNOSIS — R45.851 DEPRESSION WITH SUICIDAL IDEATION: ICD-10-CM

## 2024-07-10 DIAGNOSIS — F25.0 SCHIZOAFFECTIVE DISORDER, BIPOLAR TYPE (HCC): Chronic | ICD-10-CM

## 2024-07-10 PROCEDURE — 99283 EMERGENCY DEPT VISIT LOW MDM: CPT

## 2024-07-10 RX ORDER — ALPRAZOLAM 0.5 MG/1
0.5 TABLET ORAL NIGHTLY PRN
Qty: 5 TABLET | Refills: 0 | Status: SHIPPED | OUTPATIENT
Start: 2024-07-10 | End: 2024-07-15

## 2024-07-10 RX ORDER — QUETIAPINE FUMARATE 100 MG/1
100 TABLET, FILM COATED ORAL NIGHTLY
Qty: 5 TABLET | Refills: 0 | Status: SHIPPED | OUTPATIENT
Start: 2024-07-10 | End: 2024-07-15

## 2024-07-10 ASSESSMENT — ENCOUNTER SYMPTOMS
EYE ITCHING: 0
NAUSEA: 0
CONSTIPATION: 0
RHINORRHEA: 0
VOMITING: 0
ABDOMINAL DISTENTION: 0
VOICE CHANGE: 0
CHEST TIGHTNESS: 0
EYE REDNESS: 0
CHOKING: 0
PHOTOPHOBIA: 0
ABDOMINAL PAIN: 0
BACK PAIN: 0
TROUBLE SWALLOWING: 0
BLOOD IN STOOL: 0
SORE THROAT: 0
EYE PAIN: 0
COUGH: 0
SINUS PRESSURE: 0
DIARRHEA: 0
EYE DISCHARGE: 0
WHEEZING: 0
SHORTNESS OF BREATH: 0

## 2024-07-10 NOTE — ED PROVIDER NOTES
[facility]  eMERGENCY dEPARTMENT eNCOUnter          CHIEF COMPLAINT       Chief Complaint   Patient presents with    Medication Refill       Nurses Notes reviewed and I agree except as noted in the HPI.      HISTORY OF PRESENT ILLNESS    Jose Kaplan is a 33 y.o. male who presents for medication refill.  Patient states he is out of his alprazolam and his Seroquel.  Patient states that he usually gets them from his provider in [place].  Patient states he has not been able to contact them.  Patient does not have an upcoming appointment till September.  Here today the patient is awake alert and oriented not complaining of any physical complaints at this time.  Patient does have a history of schizophrenia with manic disorder he is not manic at this time.  Patient denies hearing voices today.  Patient is not angry or combative.  Patient is not suicidal or homicidal.  Medication refill patient is otherwise resting comfortably on the cot no apparent distress no other physical complaints.    REVIEW OF SYSTEMS     Review of Systems   Constitutional:  Negative for activity change, appetite change, diaphoresis, fatigue and unexpected weight change.   HENT:  Negative for congestion, ear discharge, ear pain, hearing loss, rhinorrhea, sinus pressure, sore throat, trouble swallowing and voice change.    Eyes:  Negative for photophobia, pain, discharge, redness and itching.   Respiratory:  Negative for cough, choking, chest tightness, shortness of breath and wheezing.    Cardiovascular:  Negative for chest pain, palpitations and leg swelling.   Gastrointestinal:  Negative for abdominal distention, abdominal pain, blood in stool, constipation, diarrhea, nausea and vomiting.   Endocrine: Negative for polydipsia, polyphagia and polyuria.   Genitourinary:  Negative for decreased urine volume, difficulty urinating, dysuria, enuresis, frequency, hematuria, penile pain, penile swelling, scrotal swelling and urgency.

## 2024-07-10 NOTE — DISCHARGE INSTRUCTIONS
Patient presents today for medication refill.  Patient has been given a short course of medications he is instructed to take those as directed.  He is instructed to follow-up with his physician and call for an appointment within the next 1 to 2 days for refills.  He is instructed to return to the nearest emergency room immediately for any new or worsening complaints.

## 2024-07-11 ENCOUNTER — HOSPITAL ENCOUNTER (EMERGENCY)
Age: 34
Discharge: HOME | End: 2024-07-11
Attending: EMERGENCY MEDICINE
Payer: MEDICARE

## 2024-07-11 VITALS
SYSTOLIC BLOOD PRESSURE: 142 MMHG | DIASTOLIC BLOOD PRESSURE: 67 MMHG | OXYGEN SATURATION: 97 % | TEMPERATURE: 97.2 F | BODY MASS INDEX: 28.25 KG/M2 | HEART RATE: 111 BPM | WEIGHT: 180 LBS | HEIGHT: 67 IN | RESPIRATION RATE: 18 BRPM

## 2024-07-11 VITALS
HEART RATE: 107 BPM | DIASTOLIC BLOOD PRESSURE: 76 MMHG | OXYGEN SATURATION: 100 % | RESPIRATION RATE: 16 BRPM | SYSTOLIC BLOOD PRESSURE: 126 MMHG

## 2024-07-11 DIAGNOSIS — M79.642 BILATERAL HAND PAIN: Primary | ICD-10-CM

## 2024-07-11 DIAGNOSIS — M79.641 BILATERAL HAND PAIN: Primary | ICD-10-CM

## 2024-07-11 DIAGNOSIS — Z76.0 ENCOUNTER FOR MEDICATION REFILL: ICD-10-CM

## 2024-07-11 DIAGNOSIS — M79.671 RIGHT FOOT PAIN: ICD-10-CM

## 2024-07-11 PROCEDURE — 6370000000 HC RX 637 (ALT 250 FOR IP): Performed by: NURSE PRACTITIONER

## 2024-07-11 PROCEDURE — 99283 EMERGENCY DEPT VISIT LOW MDM: CPT

## 2024-07-11 RX ORDER — IBUPROFEN 800 MG/1
800 TABLET ORAL ONCE
Status: COMPLETED | OUTPATIENT
Start: 2024-07-11 | End: 2024-07-11

## 2024-07-11 RX ORDER — CYCLOBENZAPRINE HCL 10 MG
10 TABLET ORAL 3 TIMES DAILY PRN
Qty: 12 TABLET | Refills: 0 | Status: SHIPPED | OUTPATIENT
Start: 2024-07-11 | End: 2024-07-15

## 2024-07-11 RX ORDER — CYCLOBENZAPRINE HCL 10 MG
10 TABLET ORAL ONCE
Status: COMPLETED | OUTPATIENT
Start: 2024-07-11 | End: 2024-07-11

## 2024-07-11 RX ORDER — IBUPROFEN 800 MG/1
800 TABLET ORAL EVERY 8 HOURS PRN
Qty: 20 TABLET | Refills: 0 | Status: SHIPPED | OUTPATIENT
Start: 2024-07-11

## 2024-07-11 RX ADMIN — CYCLOBENZAPRINE 10 MG: 10 TABLET, FILM COATED ORAL at 23:31

## 2024-07-11 RX ADMIN — IBUPROFEN 800 MG: 800 TABLET ORAL at 16:15

## 2024-07-11 RX ADMIN — IBUPROFEN 800 MG: 800 TABLET ORAL at 23:30

## 2024-07-11 RX ADMIN — CYCLOBENZAPRINE 10 MG: 10 TABLET, FILM COATED ORAL at 16:15

## 2024-07-11 ASSESSMENT — PAIN - FUNCTIONAL ASSESSMENT
PAIN_FUNCTIONAL_ASSESSMENT: NONE - DENIES PAIN
PAIN_FUNCTIONAL_ASSESSMENT: NONE - DENIES PAIN

## 2024-07-11 ASSESSMENT — PAIN SCALES - GENERAL
PAINLEVEL_OUTOF10: 5
PAINLEVEL_OUTOF10: 0

## 2024-07-11 ASSESSMENT — PAIN DESCRIPTION - LOCATION: LOCATION: HAND

## 2024-07-11 ASSESSMENT — ENCOUNTER SYMPTOMS
ABDOMINAL PAIN: 0
COLOR CHANGE: 0
COLOR CHANGE: 0
SHORTNESS OF BREATH: 0

## 2024-07-11 ASSESSMENT — PAIN DESCRIPTION - ORIENTATION: ORIENTATION: RIGHT;LEFT

## 2024-07-11 NOTE — ED PROVIDER NOTES
DIAGNOSTIC      OTHER SURGICAL HISTORY      lumbar puncture    IN COLONOSCOPY W/BIOPSY SINGLE/MULTIPLE  8/9/2017    COLONOSCOPY WITH BIOPSY performed by Priyanka Mojica MD at [facility]    IN EGD TRANSORAL BIOPSY SINGLE/MULTIPLE N/A 3/20/2017    EGD BIOPSY performed by Jacobo Villalobos MD at [facility]    IN ESOPHAGOGASTRODUODENOSCOPY TRANSORAL DIAGNOSTIC N/A 8/9/2017    EGD ESOPHAGOGASTRODUODENOSCOPY performed by Priyanka Mojica MD at [facility]    SIGMOIDOSCOPY N/A 3/20/2017    SIGMOIDOSCOPY DIAGNOSTIC FLEXIBLE performed by Jacobo Villalobos MD at [facility]    UPPER GASTROINTESTINAL ENDOSCOPY  2/4/16    UPPER GASTROINTESTINAL ENDOSCOPY N/A 6/13/2018    GASTRITIS    UPPER GASTROINTESTINAL ENDOSCOPY N/A 9/20/2018    EGD BIOPSY performed by Marianne Brooke MD at [facility] OR         FAMILY HISTORY           Problem Relation Age of Onset    Diabetes Father     Alcohol Abuse Father     Depression Father     Arthritis Father     High Blood Pressure Father     Other Father         aneurysm & epilepsy    Migraines Father     Arthritis Mother     Other Mother         aneurysm & epilepsy    Migraines Mother     Diabetes Brother         Aunt and uncles    Depression Brother     Mental Illness Brother     Other Brother         epilepsy    Migraines Brother     Stroke Other         Uncle    Other Brother         murdered Oct 6th, 2014    Colon Cancer Paternal Cousin 42    Other Sister         epilepsy    Migraines Sister      Family Status   Relation Name Status    Father  Alive    Mother  Alive    Brother  (Not Specified)    Other  (Not Specified)    Brother  (Not Specified)    PCousin  (Not Specified)    Sister  (Not Specified)        SOCIAL HISTORY      reports that he has been smoking cigarettes. He has a 7.5 pack-year smoking history. He has never used smokeless tobacco. He reports that he does not currently use alcohol. He reports that he does not currently use drugs after  the patient.    Evaluation and treatment course in the ED, and plan of care upon discharge was discussed in length with the patient. Patient had no further questions prior to being discharged and was instructed to return to the ED for new or worsening symptoms.          CONSULTS:  None    PROCEDURES:  Procedures    FINAL IMPRESSION      1. Bilateral hand pain    2. Encounter for medication refill            Problem List  Patient Active Problem List   Diagnosis Code    Opioid abuse ([facility]) F11.10    Noncompliance Z91.199    Rectal bleeding K62.5    Smoker F17.200    Juvenile rheumatoid arthritis ([facility]) M08.00    SRIRAM (obstructive sleep apnea) G47.33    Primary insomnia F51.01    Calculus of bile duct with acute on chronic cholecystitis K80.46    Mild intermittent asthma without complication J45.20    Gastritis K29.70    Generalized abdominal pain R10.84    Anemia D64.9    Elevated liver enzymes R74.8    Nausea and vomiting R11.2    Opioid type dependence, continuous ([facility]) F11.20    Abdominal pain R10.9    Diarrhea R19.7    Irritable bowel syndrome with both constipation and diarrhea K58.2    Schizoaffective disorder, bipolar type ([facility]) F25.0    GI bleed K92.2    Depression with suicidal ideation F32.A, R45.851    Schizoaffective disorder ([facility]) F25.9    MDD (major depressive disorder), recurrent severe, without psychosis ([facility]) F33.2    Severe episode of recurrent major depressive disorder, without psychotic features ([facility]) F33.2    Diabetes mellitus type 2 in obese E11.69, E66.9    Mixed hyperlipidemia E78.2    Cocaine abuse ([facility]) F14.10    Acute psychosis ([facility]) F23    PUD (peptic ulcer disease) K27.9    Gastroesophageal reflux disease without esophagitis K21.9    Prediabetes R73.03    Acute respiratory failure with hypoxia ([facility]) J96.01    Rhabdomyolysis M62.82    Acute encephalopathy G93.40    PHI (acute kidney injury) ([facility]) N17.9    High anion gap metabolic acidosis E87.29         DISPOSITION/PLAN   DISPOSITION Decision

## 2024-07-11 NOTE — ED PROVIDER NOTES
eMERGENCY dEPARTMENT eNCOUnter   Independent Attestation     Pt Name: Jose Kaplan  MRN: [id number]  Birthdate 1990  Date of evaluation: 7/11/24     Jose Kaplan is a 33 y.o. male with CC: Hand Pain (Med refill )        This visit was performed by both a physician and an APC. I performed all aspects of the MDM as documented.      Chrissie Estes MD  Attending Emergency Physician            Chrissie Estes MD  07/11/24 7823

## 2024-07-11 NOTE — DISCHARGE INSTRUCTIONS
Take medications as prescribed.  Flexeril can cause drowsiness.  Follow-up with primary care clinic provided.  Return to emergency department for worsening or new symptoms.

## 2024-07-12 ENCOUNTER — HOSPITAL ENCOUNTER (EMERGENCY)
Age: 34
Discharge: HOME | End: 2024-07-12
Attending: EMERGENCY MEDICINE
Payer: MEDICARE

## 2024-07-12 VITALS
RESPIRATION RATE: 18 BRPM | BODY MASS INDEX: 25.2 KG/M2 | DIASTOLIC BLOOD PRESSURE: 88 MMHG | SYSTOLIC BLOOD PRESSURE: 131 MMHG | HEIGHT: 71 IN | OXYGEN SATURATION: 96 % | HEART RATE: 101 BPM | WEIGHT: 180 LBS

## 2024-07-12 DIAGNOSIS — M79.642 LEFT HAND PAIN: Primary | ICD-10-CM

## 2024-07-12 DIAGNOSIS — L84 CALLUS OF FOOT: ICD-10-CM

## 2024-07-12 PROCEDURE — 99283 EMERGENCY DEPT VISIT LOW MDM: CPT

## 2024-07-12 PROCEDURE — 6370000000 HC RX 637 (ALT 250 FOR IP): Performed by: EMERGENCY MEDICINE

## 2024-07-12 RX ORDER — IBUPROFEN 600 MG/1
600 TABLET ORAL ONCE
Status: COMPLETED | OUTPATIENT
Start: 2024-07-12 | End: 2024-07-12

## 2024-07-12 RX ADMIN — IBUPROFEN 600 MG: 600 TABLET, FILM COATED ORAL at 21:59

## 2024-07-12 ASSESSMENT — PAIN DESCRIPTION - DESCRIPTORS
DESCRIPTORS: ACHING
DESCRIPTORS_2: ACHING

## 2024-07-12 ASSESSMENT — ENCOUNTER SYMPTOMS
BACK PAIN: 0
SHORTNESS OF BREATH: 0
COLOR CHANGE: 0
ABDOMINAL PAIN: 0
EYE PAIN: 0

## 2024-07-12 ASSESSMENT — PAIN DESCRIPTION - FREQUENCY: FREQUENCY: CONTINUOUS

## 2024-07-12 ASSESSMENT — PAIN DESCRIPTION - LOCATION
LOCATION_2: FOOT
LOCATION: HAND

## 2024-07-12 ASSESSMENT — PAIN DESCRIPTION - ORIENTATION
ORIENTATION_2: RIGHT
ORIENTATION: LEFT

## 2024-07-12 ASSESSMENT — PAIN - FUNCTIONAL ASSESSMENT: PAIN_FUNCTIONAL_ASSESSMENT: 0-10

## 2024-07-12 ASSESSMENT — PAIN DESCRIPTION - PAIN TYPE: TYPE: ACUTE PAIN

## 2024-07-12 ASSESSMENT — PAIN SCALES - GENERAL: PAINLEVEL_OUTOF10: 5

## 2024-07-12 NOTE — ED PROVIDER NOTES
[facility]  eMERGENCY dEPARTMENT eNCOUnter      Pt Name: Jose Kaplan  MRN: [id number]  Birthdate 1990  Date of evaluation: 7/11/2024  Provider: MASSIEL Arteaga CNP    CHIEF COMPLAINT       Chief Complaint   Patient presents with    Hand Pain         HISTORY OF PRESENT ILLNESS  (Location/Symptom, Timing/Onset, Context/Setting, Quality, Duration, Modifying Factors, Severity.)   Jose Kaplan is a 33 y.o. male who presents to the emergency department for evaluation of bilateral hand pain and right foot pain.  Denies recent injury.  Patient was evaluated here in the ER earlier today for bilateral hand pain and discharged on Motrin and Flexeril which he states he did not get filled yet.  Patient requested something to eat something drink upon arrival to the ER.  Patient states he just would like some urges to lay down for little bit and rest.  He is homeless.      Nursing Notes were reviewed.    ALLERGIES     Dicyclomine, Famotidine, Geodon [ziprasidone hcl], Haloperidol, Iv dye [iodides], Reglan [metoclopramide], Ibuprofen, Aspirin, Dicyclomine hcl, Hydroxyzine hcl, Iodine, Metronidazole, Mirtazapine, Ondansetron hcl, Promethazine, and Ziprasidone    CURRENT MEDICATIONS       Discharge Medication List as of 7/11/2024 11:20 PM        CONTINUE these medications which have NOT CHANGED    Details   ibuprofen (ADVIL;MOTRIN) 800 MG tablet Take 1 tablet by mouth every 8 hours as needed for Pain, Disp-20 tablet, R-0Print      cyclobenzaprine (FLEXERIL) 10 MG tablet Take 1 tablet by mouth 3 times daily as needed for Muscle spasms, Disp-12 tablet, R-0Print      !! QUEtiapine (SEROQUEL) 100 MG tablet Take 1 tablet by mouth at bedtime for 5 days, Disp-5 tablet, R-0Print      ALPRAZolam (XANAX) 0.5 MG tablet Take 1 tablet by mouth nightly as needed for Sleep for up to 5 doses. Max Daily Amount: 0.5 mg, Disp-5 tablet, R-0Normal      !! QUEtiapine (SEROQUEL) 100 MG tablet Take 2 tablets by mouth nightly,

## 2024-07-13 NOTE — ED PROVIDER NOTES
EMERGENCY DEPARTMENT ENCOUNTER    Pt Name: Jose Kaplan  MRN: [id number]  Birthdate 1990  Date of evaluation: 7/12/24  CHIEF COMPLAINT       Chief Complaint   Patient presents with    Hand Pain     C/O LEFT HAND PAIN AND RIGHT FOOT PAIN ON-GOING. DENIES INJURY. WOULD LIKE EVALUATED THEN A CAB TO [facility].      HISTORY OF PRESENT ILLNESS   33-year-old male presents for complaints of left hand pain and right foot pain.  Reports these are both ongoing issues for him that have been going on for \"a while\" he states that he normally takes ibuprofen and wanted a dose for his pain.  He denies any new injuries.  He states that he has been walking a lot and he thinks that this is why his foot has been bothering him.  Patient also requesting a cab to go to [facility] so he can try and get housing.  He denies any suicidal or homicidal ideation he denies any visual or auditory hallucinations, denies any recent drug or alcohol use.    The history is provided by the patient.           REVIEW OF SYSTEMS     Review of Systems   Constitutional:  Negative for chills and fever.   HENT:  Negative for congestion and ear pain.    Eyes:  Negative for pain.   Respiratory:  Negative for shortness of breath.    Cardiovascular:  Negative for chest pain, palpitations and leg swelling.   Gastrointestinal:  Negative for abdominal pain.   Genitourinary:  Negative for dysuria and flank pain.   Musculoskeletal:  Negative for back pain.        Hand pain, foot pain   Skin:  Negative for color change.   Neurological:  Negative for numbness and headaches.   Psychiatric/Behavioral:  Negative for confusion.    All other systems reviewed and are negative.    PASTMEDICAL HISTORY     Past Medical History:   Diagnosis Date    Anxiety     Arthritis     Asthma     Bipolar I disorder, most recent episode (or current) depressed, unspecified 9/12/2014    Clostridium difficile infection     COPD (chronic obstructive pulmonary disease) ([facility])     Depression

## 2024-07-17 ENCOUNTER — HOSPITAL ENCOUNTER (EMERGENCY)
Age: 34
Discharge: HOME | End: 2024-07-17
Payer: MEDICARE

## 2024-07-17 VITALS
RESPIRATION RATE: 16 BRPM | HEART RATE: 122 BPM | OXYGEN SATURATION: 97 % | DIASTOLIC BLOOD PRESSURE: 88 MMHG | TEMPERATURE: 98 F | SYSTOLIC BLOOD PRESSURE: 134 MMHG

## 2024-07-17 DIAGNOSIS — M79.641 PAIN IN BOTH HANDS: Primary | ICD-10-CM

## 2024-07-17 DIAGNOSIS — M79.642 PAIN IN BOTH HANDS: Primary | ICD-10-CM

## 2024-07-17 PROCEDURE — 6370000000 HC RX 637 (ALT 250 FOR IP)

## 2024-07-17 PROCEDURE — 99283 EMERGENCY DEPT VISIT LOW MDM: CPT

## 2024-07-17 RX ORDER — CYCLOBENZAPRINE HCL 5 MG
5 TABLET ORAL ONCE
Status: COMPLETED | OUTPATIENT
Start: 2024-07-17 | End: 2024-07-17

## 2024-07-17 RX ADMIN — CYCLOBENZAPRINE HYDROCHLORIDE 5 MG: 5 TABLET, FILM COATED ORAL at 20:02

## 2024-07-17 ASSESSMENT — PAIN DESCRIPTION - DESCRIPTORS: DESCRIPTORS: ACHING

## 2024-07-17 ASSESSMENT — PAIN DESCRIPTION - ORIENTATION: ORIENTATION: RIGHT;LEFT

## 2024-07-17 ASSESSMENT — PAIN SCALES - GENERAL: PAINLEVEL_OUTOF10: 3

## 2024-07-17 ASSESSMENT — PAIN DESCRIPTION - LOCATION: LOCATION: FOOT

## 2024-07-18 ENCOUNTER — HOSPITAL ENCOUNTER (EMERGENCY)
Age: 34
Discharge: HOME | End: 2024-07-18
Attending: EMERGENCY MEDICINE
Payer: MEDICARE

## 2024-07-18 VITALS
HEIGHT: 67 IN | HEART RATE: 101 BPM | DIASTOLIC BLOOD PRESSURE: 87 MMHG | BODY MASS INDEX: 28.25 KG/M2 | WEIGHT: 180 LBS | OXYGEN SATURATION: 97 % | RESPIRATION RATE: 18 BRPM | SYSTOLIC BLOOD PRESSURE: 126 MMHG

## 2024-07-18 VITALS
WEIGHT: 180 LBS | SYSTOLIC BLOOD PRESSURE: 129 MMHG | BODY MASS INDEX: 28.25 KG/M2 | HEIGHT: 67 IN | HEART RATE: 96 BPM | DIASTOLIC BLOOD PRESSURE: 97 MMHG | OXYGEN SATURATION: 97 % | RESPIRATION RATE: 19 BRPM

## 2024-07-18 DIAGNOSIS — Z59.00 HOMELESS: Primary | ICD-10-CM

## 2024-07-18 DIAGNOSIS — Z76.0 ENCOUNTER FOR MEDICATION REFILL: ICD-10-CM

## 2024-07-18 DIAGNOSIS — M79.673 CHRONIC FOOT PAIN, UNSPECIFIED LATERALITY: ICD-10-CM

## 2024-07-18 DIAGNOSIS — G89.29 CHRONIC HAND PAIN, UNSPECIFIED LATERALITY: ICD-10-CM

## 2024-07-18 DIAGNOSIS — G89.29 CHRONIC FOOT PAIN, UNSPECIFIED LATERALITY: ICD-10-CM

## 2024-07-18 DIAGNOSIS — Z76.0 MEDICATION REFILL: Primary | ICD-10-CM

## 2024-07-18 DIAGNOSIS — M79.643 CHRONIC HAND PAIN, UNSPECIFIED LATERALITY: ICD-10-CM

## 2024-07-18 PROCEDURE — 99283 EMERGENCY DEPT VISIT LOW MDM: CPT

## 2024-07-18 PROCEDURE — 6370000000 HC RX 637 (ALT 250 FOR IP): Performed by: NURSE PRACTITIONER

## 2024-07-18 PROCEDURE — 6370000000 HC RX 637 (ALT 250 FOR IP): Performed by: EMERGENCY MEDICINE

## 2024-07-18 RX ORDER — IBUPROFEN 800 MG/1
800 TABLET ORAL ONCE
Status: COMPLETED | OUTPATIENT
Start: 2024-07-18 | End: 2024-07-18

## 2024-07-18 RX ORDER — CYCLOBENZAPRINE HCL 10 MG
10 TABLET ORAL ONCE
Status: COMPLETED | OUTPATIENT
Start: 2024-07-18 | End: 2024-07-18

## 2024-07-18 RX ORDER — IBUPROFEN 600 MG/1
600 TABLET ORAL ONCE
Status: COMPLETED | OUTPATIENT
Start: 2024-07-18 | End: 2024-07-18

## 2024-07-18 RX ORDER — ALPRAZOLAM 0.5 MG/1
0.5 TABLET ORAL 3 TIMES DAILY PRN
Qty: 3 TABLET | Refills: 0 | Status: SHIPPED | OUTPATIENT
Start: 2024-07-18 | End: 2024-07-19

## 2024-07-18 RX ADMIN — IBUPROFEN 800 MG: 800 TABLET ORAL at 22:45

## 2024-07-18 RX ADMIN — CYCLOBENZAPRINE 10 MG: 10 TABLET, FILM COATED ORAL at 22:44

## 2024-07-18 RX ADMIN — IBUPROFEN 600 MG: 600 TABLET, FILM COATED ORAL at 09:06

## 2024-07-18 SDOH — ECONOMIC STABILITY - HOUSING INSECURITY: HOMELESSNESS UNSPECIFIED: Z59.00

## 2024-07-18 ASSESSMENT — ENCOUNTER SYMPTOMS
COUGH: 0
COLOR CHANGE: 0
VOMITING: 0
BACK PAIN: 0
ABDOMINAL PAIN: 0
SHORTNESS OF BREATH: 0
BACK PAIN: 0
SHORTNESS OF BREATH: 0
WHEEZING: 0
COLOR CHANGE: 0
ABDOMINAL PAIN: 0
NAUSEA: 0
VOMITING: 0
SHORTNESS OF BREATH: 0
NAUSEA: 0
CHEST TIGHTNESS: 0

## 2024-07-18 ASSESSMENT — LIFESTYLE VARIABLES
HOW OFTEN DO YOU HAVE A DRINK CONTAINING ALCOHOL: NEVER
HOW MANY STANDARD DRINKS CONTAINING ALCOHOL DO YOU HAVE ON A TYPICAL DAY: PATIENT DOES NOT DRINK
HOW MANY STANDARD DRINKS CONTAINING ALCOHOL DO YOU HAVE ON A TYPICAL DAY: PATIENT DOES NOT DRINK
HOW OFTEN DO YOU HAVE A DRINK CONTAINING ALCOHOL: NEVER

## 2024-07-18 ASSESSMENT — PAIN SCALES - GENERAL
PAINLEVEL_OUTOF10: 0
PAINLEVEL_OUTOF10: 3
PAINLEVEL_OUTOF10: 3

## 2024-07-18 ASSESSMENT — PAIN DESCRIPTION - DESCRIPTORS: DESCRIPTORS: ACHING

## 2024-07-18 ASSESSMENT — PAIN DESCRIPTION - ORIENTATION
ORIENTATION: RIGHT;LEFT
ORIENTATION: RIGHT;LEFT

## 2024-07-18 ASSESSMENT — PAIN DESCRIPTION - LOCATION
LOCATION: HAND
LOCATION: FOOT

## 2024-07-18 ASSESSMENT — PAIN - FUNCTIONAL ASSESSMENT
PAIN_FUNCTIONAL_ASSESSMENT: NONE - DENIES PAIN
PAIN_FUNCTIONAL_ASSESSMENT: 0-10

## 2024-07-18 NOTE — ED NOTES
Pt is homeless and recommends to go to [facility]. Writer gave pt handouts for homeless shelters, food pantries and hot meals.

## 2024-07-18 NOTE — ED PROVIDER NOTES
hcl, Iodine, Metronidazole, Mirtazapine, Ondansetron hcl, Promethazine, and Ziprasidone    Home Medications:  Prior to Admission medications    Medication Sig Start Date End Date Taking? Authorizing Provider   ibuprofen (ADVIL;MOTRIN) 800 MG tablet Take 1 tablet by mouth every 8 hours as needed for Pain 7/11/24   Miki Ritchie APRN - CNP   QUEtiapine (SEROQUEL) 100 MG tablet Take 1 tablet by mouth at bedtime for 5 days 7/10/24 7/15/24  Ross Don,    QUEtiapine (SEROQUEL) 100 MG tablet Take 2 tablets by mouth nightly 6/24/24   Sami Araujo PA   tiZANidine (ZANAFLEX) 2 MG capsule Take 1 capsule by mouth 3 times daily 6/24/24   Sami Araujo PA   diphenhydrAMINE (BENADRYL) 25 MG capsule Take 1 capsule by mouth nightly as needed for Itching or Sleep 5/10/24   Nicholas Austin DO   sertraline (ZOLOFT) 25 MG tablet Take 1 tablet by mouth daily 4/26/24   Elroy You MD   traZODone (DESYREL) 50 MG tablet Take 1 tablet by mouth nightly as needed for Sleep 4/26/24 5/26/24  Elroy You MD   acetaminophen (TYLENOL) 500 MG tablet Take 2 tablets by mouth 3 times daily as needed for Pain 3/13/24 4/12/24  Delfina Cha APRN - CNP   clotrimazole-betamethasone (LOTRISONE) 1-0.05 % cream Apply topically 2 times daily to feet. 2/29/24   Topher Daley PA-C   ondansetron (ZOFRAN-ODT) 4 MG disintegrating tablet Take 1 tablet by mouth 3 times daily as needed for Nausea or Vomiting 1/30/24   Jono Lewis MD   ondansetron (ZOFRAN) 4 MG tablet Take 1 tablet by mouth every 8 hours as needed for Nausea or Vomiting 1/16/24   Chhaya Frey APRN - CNP   metFORMIN (GLUCOPHAGE) 500 MG tablet Take 1 tablet by mouth 2 times daily (with meals) Indications: last dispensed 1/6/23 5/28/23 6/27/23  Sara Bryan,    naloxone 4 MG/0.1ML LIQD nasal spray  3/13/23   Provider, MD Valeria   meloxicam (MOBIC) 7.5 MG tablet Take 1 tablet by mouth 2 times daily as needed for Pain 8/19/21 10/30/21

## 2024-07-18 NOTE — ED PROVIDER NOTES
EMERGENCY DEPARTMENT ENCOUNTER    Pt Name: Jose Kaplan  MRN: [id number]  Birthdate 1990  Date of evaluation: 7/18/24  CHIEF COMPLAINT       Chief Complaint   Patient presents with    Medication Refill     Pt states newly in area from [place] and needs med refills; pt also states would like some help getting referred to [facility].     HISTORY OF PRESENT ILLNESS   Presenting for medication refill and referral.  Patient is from [place].  He is requesting Flexeril, Motrin.  He said that he uses these medications because he walks around a lot and his muscles ache.  He is homeless and said that he wants to be referred to [facility] because they have a good program to help him find housing.    The history is provided by the patient.           REVIEW OF SYSTEMS     Review of Systems   Constitutional:  Negative for chills and fever.   HENT:  Negative for congestion.    Eyes:  Negative for visual disturbance.   Respiratory:  Negative for cough and shortness of breath.    Cardiovascular:  Negative for chest pain.   Gastrointestinal:  Negative for abdominal pain, nausea and vomiting.   Genitourinary:  Negative for flank pain.   Musculoskeletal:  Positive for myalgias.   Neurological:  Negative for dizziness, light-headedness and headaches.   Psychiatric/Behavioral:  Negative for behavioral problems.      PASTMEDICAL HISTORY     Past Medical History:   Diagnosis Date    Anxiety     Arthritis     Asthma     Bipolar I disorder, most recent episode (or current) depressed, unspecified 9/12/2014    Clostridium difficile infection     COPD (chronic obstructive pulmonary disease) ([facility])     Depression     Disease of blood and blood forming organ     Eczema     Fracture, metacarpal     R 4th and 5th    Gastric ulcer     Gastritis 06/13/2018    GERD (gastroesophageal reflux disease)     GI bleed     H. 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[facility])     Neuromuscular disorder

## 2024-07-18 NOTE — ED TRIAGE NOTES
Mode of arrival (squad #, walk in, police, etc) : walk-in        Chief complaint(s): Pt arrived to ED from [place]; pt in need of med refills and referrals to [facility]        Arrival Note (brief scenario, treatment PTA, etc).: Pt is homeless and would like to discuss placement options        C= \"Have you ever felt that you should Cut down on your drinking?\"  No  A= \"Have people Annoyed you by criticizing your drinking?\"  No  G= \"Have you ever felt bad or Guilty about your drinking?\"  No  E= \"Have you ever had a drink as an Eye-opener first thing in the morning to steady your nerves or to help a hangover?\"  No      Deferred []      Reason for deferring: N/A    *If yes to two or more: probable alcohol abuse.*

## 2024-07-19 NOTE — ED NOTES
Pt arrives to the ED for c/o needing medication refill   Pt here often with similar complaints  RR is even and non-labored

## 2024-07-19 NOTE — ED PROVIDER NOTES
Juvenile rheumatoid arthritis ([facility]) M08.00    SRIRAM (obstructive sleep apnea) G47.33    Primary insomnia F51.01    Calculus of bile duct with acute on chronic cholecystitis K80.46    Mild intermittent asthma without complication J45.20    Gastritis K29.70    Generalized abdominal pain R10.84    Anemia D64.9    Elevated liver enzymes R74.8    Nausea and vomiting R11.2    Opioid type dependence, continuous ([facility]) F11.20    Abdominal pain R10.9    Diarrhea R19.7    Irritable bowel syndrome with both constipation and diarrhea K58.2    Schizoaffective disorder, bipolar type ([facility]) F25.0    GI bleed K92.2    Depression with suicidal ideation F32.A, R45.851    Schizoaffective disorder ([facility]) F25.9    MDD (major depressive disorder), recurrent severe, without psychosis ([facility]) F33.2    Severe episode of recurrent major depressive disorder, without psychotic features ([facility]) F33.2    Diabetes mellitus type 2 in obese E11.69, E66.9    Mixed hyperlipidemia E78.2    Cocaine abuse ([facility]) F14.10    Acute psychosis ([facility]) F23    PUD (peptic ulcer disease) K27.9    Gastroesophageal reflux disease without esophagitis K21.9    Prediabetes R73.03    Acute respiratory failure with hypoxia ([facility]) J96.01    Rhabdomyolysis M62.82    Acute encephalopathy G93.40    PHI (acute kidney injury) ([facility]) N17.9    High anion gap metabolic acidosis E87.29         DISPOSITION/PLAN   DISPOSITION Decision To Discharge 07/18/2024 10:27:33 PM      PATIENT REFERRED TO:   [facility] ED  [address] [address]  [phone]    If symptoms worsen, As needed      DISCHARGE MEDICATIONS:     New Prescriptions    ALPRAZOLAM (XANAX) 0.5 MG TABLET    Take 1 tablet by mouth 3 times daily as needed for Anxiety for up to 1 day. Max Daily Amount: 1.5 mg           (Please note that portions of this note were completed with a voice recognition program.  Efforts were made to edit the dictations but occasionally words are mis-transcribed.)    Chhaya Fery,

## 2024-07-20 ENCOUNTER — HOSPITAL ENCOUNTER (EMERGENCY)
Age: 34
Discharge: HOME | End: 2024-07-20
Attending: STUDENT IN AN ORGANIZED HEALTH CARE EDUCATION/TRAINING PROGRAM
Payer: MEDICARE

## 2024-07-20 ENCOUNTER — HOSPITAL ENCOUNTER (EMERGENCY)
Age: 34
Discharge: HOME | End: 2024-07-20
Attending: EMERGENCY MEDICINE
Payer: MEDICARE

## 2024-07-20 VITALS
OXYGEN SATURATION: 98 % | SYSTOLIC BLOOD PRESSURE: 138 MMHG | DIASTOLIC BLOOD PRESSURE: 85 MMHG | RESPIRATION RATE: 16 BRPM | HEART RATE: 80 BPM | TEMPERATURE: 98.2 F

## 2024-07-20 VITALS
OXYGEN SATURATION: 95 % | SYSTOLIC BLOOD PRESSURE: 183 MMHG | RESPIRATION RATE: 16 BRPM | DIASTOLIC BLOOD PRESSURE: 149 MMHG | HEART RATE: 94 BPM

## 2024-07-20 DIAGNOSIS — Z00.8 ENCOUNTER FOR MEDICAL ASSESSMENT: Primary | ICD-10-CM

## 2024-07-20 DIAGNOSIS — M79.642 HAND PAIN, LEFT: Primary | ICD-10-CM

## 2024-07-20 PROCEDURE — 99283 EMERGENCY DEPT VISIT LOW MDM: CPT

## 2024-07-20 PROCEDURE — 6370000000 HC RX 637 (ALT 250 FOR IP): Performed by: EMERGENCY MEDICINE

## 2024-07-20 PROCEDURE — 6370000000 HC RX 637 (ALT 250 FOR IP): Performed by: STUDENT IN AN ORGANIZED HEALTH CARE EDUCATION/TRAINING PROGRAM

## 2024-07-20 RX ORDER — ACETAMINOPHEN 500 MG
1000 TABLET ORAL ONCE
Status: COMPLETED | OUTPATIENT
Start: 2024-07-20 | End: 2024-07-20

## 2024-07-20 RX ORDER — CYCLOBENZAPRINE HCL 10 MG
10 TABLET ORAL NIGHTLY PRN
Qty: 7 TABLET | Refills: 0 | Status: SHIPPED | OUTPATIENT
Start: 2024-07-20 | End: 2024-07-20

## 2024-07-20 RX ORDER — IBUPROFEN 600 MG/1
600 TABLET ORAL EVERY 6 HOURS PRN
Qty: 15 TABLET | Refills: 0 | Status: SHIPPED | OUTPATIENT
Start: 2024-07-20 | End: 2024-07-20

## 2024-07-20 RX ORDER — CYCLOBENZAPRINE HCL 10 MG
10 TABLET ORAL NIGHTLY PRN
Qty: 7 TABLET | Refills: 0 | Status: SHIPPED | OUTPATIENT
Start: 2024-07-20 | End: 2024-07-27

## 2024-07-20 RX ORDER — CYCLOBENZAPRINE HCL 10 MG
10 TABLET ORAL ONCE
Status: COMPLETED | OUTPATIENT
Start: 2024-07-20 | End: 2024-07-20

## 2024-07-20 RX ORDER — IBUPROFEN 600 MG/1
600 TABLET ORAL EVERY 6 HOURS PRN
Qty: 15 TABLET | Refills: 0 | Status: SHIPPED | OUTPATIENT
Start: 2024-07-20 | End: 2024-07-21

## 2024-07-20 RX ORDER — IBUPROFEN 800 MG/1
800 TABLET ORAL ONCE
Status: COMPLETED | OUTPATIENT
Start: 2024-07-20 | End: 2024-07-20

## 2024-07-20 RX ADMIN — CYCLOBENZAPRINE 10 MG: 10 TABLET, FILM COATED ORAL at 19:08

## 2024-07-20 RX ADMIN — IBUPROFEN 800 MG: 800 TABLET ORAL at 19:08

## 2024-07-20 RX ADMIN — ACETAMINOPHEN 1000 MG: 500 TABLET ORAL at 03:21

## 2024-07-20 ASSESSMENT — PAIN SCALES - GENERAL
PAINLEVEL_OUTOF10: 3
PAINLEVEL_OUTOF10: 3

## 2024-07-20 ASSESSMENT — PAIN - FUNCTIONAL ASSESSMENT
PAIN_FUNCTIONAL_ASSESSMENT: 0-10
PAIN_FUNCTIONAL_ASSESSMENT: NONE - DENIES PAIN

## 2024-07-20 NOTE — ED PROVIDER NOTES
[facility] EMERGENCY DEPARTMENT ENCOUNTER      Pt Name: Jose Kaplan  MRN: [id number]  Birthdate 1990  Date of evaluation: 7/20/24    CHIEF COMPLAINT       Chief Complaint   Patient presents with    Hand Pain     Hands and feet feel stiff. Swelling in feet, started 2-3 days ago       HISTORY OF PRESENT ILLNESS   Jose Kaplan is a 33 y.o. male who presents with hand and feet pain and stiffness  Well-known to the emergency department.  Multiple ER visits.    PASTMEDICAL HISTORY     Past Medical History:   Diagnosis Date    Anxiety     Arthritis     Asthma     Bipolar I disorder, most recent episode (or current) depressed, unspecified 9/12/2014    Clostridium difficile infection     COPD (chronic obstructive pulmonary disease) ([facility])     Depression     Disease of blood and blood forming organ     Eczema     Fracture, metacarpal     R 4th and 5th    Gastric ulcer     Gastritis 06/13/2018    GERD (gastroesophageal reflux disease)     GI bleed     H. 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[facility])     Substance abuse ([facility])     Hx of opiates, benzos, cocaine, alcohol abuse    Suicidal ideation     Suicide attempt by hanging ([facility])     Tobacco dependence     Ulcerative colitis ([facility])     UTI (urinary tract infection)      Past Problem List  Patient Active Problem List   Diagnosis Code    Opioid abuse ([facility]) F11.10    Noncompliance Z91.199    Rectal bleeding K62.5    Smoker F17.200    Juvenile rheumatoid arthritis ([facility]) M08.00    SRIRAM (obstructive sleep apnea) G47.33    Primary insomnia F51.01    Calculus of bile duct with acute on chronic cholecystitis K80.46    Mild intermittent

## 2024-07-20 NOTE — ED PROVIDER NOTES
EMERGENCY DEPARTMENT ENCOUNTER    Pt Name: Jose Kaplan  MRN: [id number]  Birthdate 1990  Date of evaluation: 7/20/24  CHIEF COMPLAINT       Chief Complaint   Patient presents with    Hand Pain     Chronic and feet pt requesting so motrin and flexeril.      HISTORY OF PRESENT ILLNESS   30-year-old male presents emergency room for pain to his left hand.  Patient reports pain is somewhat chronic.  Patient denies any injuries.  He is here requesting Flexeril and Motrin for home.  Patient is seen here frequently in the emergency room for various complaints of hand and/or foot pain.  No acute changes.             REVIEW OF SYSTEMS     Review of Systems   Musculoskeletal:  Positive for arthralgias.     PASTMEDICAL HISTORY     Past Medical History:   Diagnosis Date    Anxiety     Arthritis     Asthma     Bipolar I disorder, most recent episode (or current) depressed, unspecified 9/12/2014    Clostridium difficile infection     COPD (chronic obstructive pulmonary disease) ([facility])     Depression     Disease of blood and blood forming organ     Eczema     Fracture, metacarpal     R 4th and 5th    Gastric ulcer     Gastritis 06/13/2018    GERD (gastroesophageal reflux disease)     GI bleed     H. 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

## 2024-07-21 ENCOUNTER — HOSPITAL ENCOUNTER (EMERGENCY)
Age: 34
Discharge: HOME | End: 2024-07-21
Attending: EMERGENCY MEDICINE
Payer: MEDICARE

## 2024-07-21 ENCOUNTER — HOSPITAL ENCOUNTER (EMERGENCY)
Age: 34
Discharge: HOME | End: 2024-07-21
Attending: STUDENT IN AN ORGANIZED HEALTH CARE EDUCATION/TRAINING PROGRAM

## 2024-07-21 VITALS
HEIGHT: 67 IN | OXYGEN SATURATION: 98 % | SYSTOLIC BLOOD PRESSURE: 128 MMHG | TEMPERATURE: 97.6 F | DIASTOLIC BLOOD PRESSURE: 98 MMHG | BODY MASS INDEX: 28.25 KG/M2 | WEIGHT: 180 LBS | RESPIRATION RATE: 15 BRPM | HEART RATE: 81 BPM

## 2024-07-21 VITALS
OXYGEN SATURATION: 96 % | DIASTOLIC BLOOD PRESSURE: 98 MMHG | BODY MASS INDEX: 28.19 KG/M2 | RESPIRATION RATE: 18 BRPM | WEIGHT: 180 LBS | SYSTOLIC BLOOD PRESSURE: 163 MMHG | TEMPERATURE: 97.2 F | HEART RATE: 94 BPM

## 2024-07-21 DIAGNOSIS — Z00.8 ENCOUNTER FOR MEDICAL ASSESSMENT: Primary | ICD-10-CM

## 2024-07-21 DIAGNOSIS — Z76.5 MALINGERING: Primary | ICD-10-CM

## 2024-07-21 PROCEDURE — 99282 EMERGENCY DEPT VISIT SF MDM: CPT

## 2024-07-21 RX ORDER — IBUPROFEN 600 MG/1
600 TABLET ORAL EVERY 6 HOURS PRN
Qty: 30 TABLET | Refills: 0 | Status: SHIPPED | OUTPATIENT
Start: 2024-07-21 | End: 2024-07-22

## 2024-07-21 NOTE — ED PROVIDER NOTES
syndrome    Diagnoses Considered but Do Not Suspect:  None    Pertinent Comorbid Conditions:  Bipolar    2)  Data Reviewed  Decision Rules/Scores/MIPS utilized:  None    EKG Interpretation:  None    External Documents Reviewed:  None    Imaging that is independently reviewed and interpreted by me as:  None    See more data below for the lab and radiology tests and orders.    3)  Treatment and Disposition      \"ED Course\" Notes From Epic Narrator:     9:13 AM EDT  Patient is well-known to the ER goes to multiple ERs almost every day usually always for different pain related issues.  He is asking for a muscle relaxer and Motrin which she normally takes which he already got a prescription for yesterday but has not gone to .  This point in time I am just going to discharge him have social work talk to him about either getting to [facility] or finding a shelter for him.  He needs to go  his medications.    PROCEDURES:  None      DATA FOR LAB AND RADIOLOGY TESTS ORDERED BELOW ARE REVIEWED BY THE ED CLINICIAN:    RADIOLOGY: All x-rays, CT, MRI, and formal ultrasound images (except ED bedside ultrasound) are read by the radiologist, see reports below, unless otherwise noted in MDM or here.  Reports below are reviewed by myself.  No orders to display       LABS: Lab orders shown below, the results are reviewed by myself, and all abnormals are listed below.  Labs Reviewed - No data to display    Vitals Reviewed:    Vitals:    07/21/24 0905   BP: (!) 128/98   Pulse: 81   Resp: 15   Temp: 97.6 °F (36.4 °C)   TempSrc: Oral   SpO2: 98%   Weight: 81.6 kg (180 lb)   Height: 1.702 m (5' 7\")     MEDICATIONS GIVEN TO PATIENT THIS ENCOUNTER:  No orders of the defined types were placed in this encounter.    DISCHARGE PRESCRIPTIONS:  Current Discharge Medication List        PHYSICIAN CONSULTS ORDERED THIS ENCOUNTER:  None    FINAL IMPRESSION      1. Malingering          DISPOSITION/PLAN   DISPOSITION Decision To Discharge

## 2024-07-21 NOTE — DISCHARGE INSTRUCTIONS
Thank you for visiting [facility] ED.  You need to call in morning to make appointment as directed with appropriate doctor.  Should you have any questions regarding your care or further treatment, please call [facility] Emergency Department at [phone].  Take any medications as prescribed, if given any.  Return to ED if symptoms worsen, do not improve, and unable to follow up with your physician, or other concerns arise.

## 2024-07-22 ENCOUNTER — HOSPITAL ENCOUNTER (EMERGENCY)
Age: 34
Discharge: HOME | End: 2024-07-22
Attending: EMERGENCY MEDICINE
Payer: MEDICARE

## 2024-07-22 VITALS
SYSTOLIC BLOOD PRESSURE: 119 MMHG | OXYGEN SATURATION: 96 % | RESPIRATION RATE: 16 BRPM | HEART RATE: 95 BPM | TEMPERATURE: 98.2 F | DIASTOLIC BLOOD PRESSURE: 80 MMHG

## 2024-07-22 DIAGNOSIS — M79.605 BILATERAL LEG PAIN: Primary | ICD-10-CM

## 2024-07-22 DIAGNOSIS — M79.604 BILATERAL LEG PAIN: Primary | ICD-10-CM

## 2024-07-22 PROCEDURE — 99283 EMERGENCY DEPT VISIT LOW MDM: CPT

## 2024-07-22 RX ORDER — IBUPROFEN 600 MG/1
600 TABLET ORAL EVERY 6 HOURS PRN
Qty: 30 TABLET | Refills: 0 | Status: SHIPPED | OUTPATIENT
Start: 2024-07-22

## 2024-07-22 NOTE — ED PROVIDER NOTES
eMERGENCY dEPARTMENT eNCOUnter   Independent Attestation     Pt Name: Jose Kaplan  MRN: [id number]  Birthdate 1990  Date of evaluation: 7/22/24     Jose Kaplan is a 33 y.o. male with CC: Leg Pain (bilat) and Medication Refill        This visit was performed by both a physician and an APC. I performed all aspects of the MDM as documented.      Chrissie Estes MD  Attending Emergency Physician            Chrissie Estes MD  07/22/24 3553    
following drugs: Cocaine.    REVIEW OF SYSTEMS    (2-9 systems for level 4, 10 or more for level 5)     Constitutional: Denies recent fever, chills, weakness.  Visual: Denies recent change in vision, discharge or pain.  ENT: Denies recent sore throat, nasal congestion, ear pain.  Respiratory: Denies recent shortness of breath,  cough , wheezing or pleuritic chest pain.  Cardiac:  Denies recent chest pain palpitations dyspnea on exertion or swelling in the lower extremities.   GI: denies any recent abdominal pain nausea vomiting or diarrhea.   : denies any recent difficulty urinating or pain in the genitals.   Musculoskeletal :  Denies neck pain back pain joint pain or recent trauma. + leg pain  Neurologic: denies any seizure activity headaches stroke like symptoms or syncope.  Skin:  Denies any recent new rash itching or change in skin color.  Psychiatric:  Denies any thoughts of suicide homicide.  Patient does not voice any problems with anxiety or depression    Except as noted above the remainder of the review of systems was reviewed and negative.     PHYSICAL EXAM    (up to 7 for level 4, 8 or more for level 5)     ED Triage Vitals [07/22/24 1225]   BP Temp Temp Source Pulse Respirations SpO2 Height Weight   119/80 98.2 °F (36.8 °C) Oral 95 16 96 % -- --       Physical Exam  Vitals and nursing note reviewed.   Constitutional:       Appearance: Normal appearance. He is normal weight.   HENT:      Head: Normocephalic.      Nose: Nose normal.      Mouth/Throat:      Mouth: Mucous membranes are moist.      Pharynx: Oropharynx is clear.   Eyes:      Extraocular Movements: Extraocular movements intact.      Conjunctiva/sclera: Conjunctivae normal.      Pupils: Pupils are equal, round, and reactive to light.   Cardiovascular:      Rate and Rhythm: Normal rate and regular rhythm.      Pulses: Normal pulses.      Heart sounds: Normal heart sounds.   Pulmonary:      Effort: Pulmonary effort is normal.      Breath sounds:

## 2024-07-22 NOTE — ED NOTES
Pt to ed c/o bilateral leg pain. Pt rates pain 5/10. Pt also requesting medication refill on ibuprofen and flexeril. Pt a/o x4. Respirations equal and non labored. Bed locked and in lowest position.

## 2024-07-22 NOTE — ED PROVIDER NOTES
COURSE:   Summary of Patient Presentation:      1)  Number and Complexity of Problems  Problem List This Visit:    1. Encounter for medical assessment        Differential Diagnosis: Malingering, substance abuse, URI    Diagnoses Considered but Do Not Suspect: Sepsis    Pertinent Comorbid Conditions:    Past Medical History:   Diagnosis Date    Anxiety     Arthritis     Asthma     Bipolar I disorder, most recent episode (or current) depressed, unspecified 9/12/2014    Clostridium difficile infection     COPD (chronic obstructive pulmonary disease) ([facility])     Depression     Disease of blood and blood forming organ     Eczema     Fracture, metacarpal     R 4th and 5th    Gastric ulcer     Gastritis 06/13/2018    GERD (gastroesophageal reflux disease)     GI bleed     H. 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[facility])     Substance abuse ([facility])     Hx of opiates, benzos, cocaine, alcohol abuse    Suicidal ideation     Suicide attempt by hanging ([facility])     Tobacco dependence     Ulcerative colitis ([facility])     UTI (urinary tract infection)        2)  Data Reviewed    External Documents Reviewed: Previous ER visits reviewed by myself    3)  Treatment and Disposition  [Patient repeat assessment, Disposition discussion with patient/family, Case discussed with consulting clinician, MIPS, Social determinants of health impacting treatment or disposition, Shared Decision Making, Code Status Discussion:]    Patient appears in typical state of health.  Linear thought process.  Does not appear acutely psychotic.      Based on the low acuity of concerning symptoms and improvement of symptoms,

## 2024-07-24 ENCOUNTER — HOSPITAL ENCOUNTER (EMERGENCY)
Age: 34
Discharge: HOME | End: 2024-07-24
Attending: EMERGENCY MEDICINE
Payer: MEDICARE

## 2024-07-24 VITALS
RESPIRATION RATE: 14 BRPM | HEART RATE: 100 BPM | SYSTOLIC BLOOD PRESSURE: 112 MMHG | TEMPERATURE: 98.1 F | DIASTOLIC BLOOD PRESSURE: 87 MMHG | OXYGEN SATURATION: 96 %

## 2024-07-24 DIAGNOSIS — G89.4 CHRONIC PAIN SYNDROME: Primary | ICD-10-CM

## 2024-07-24 PROCEDURE — 99282 EMERGENCY DEPT VISIT SF MDM: CPT

## 2024-07-25 ENCOUNTER — HOSPITAL ENCOUNTER (EMERGENCY)
Age: 34
Discharge: HOME | End: 2024-07-25
Attending: STUDENT IN AN ORGANIZED HEALTH CARE EDUCATION/TRAINING PROGRAM
Payer: MEDICARE

## 2024-07-25 VITALS — HEART RATE: 94 BPM | TEMPERATURE: 98 F | OXYGEN SATURATION: 97 % | RESPIRATION RATE: 14 BRPM

## 2024-07-25 VITALS
OXYGEN SATURATION: 97 % | TEMPERATURE: 97.9 F | RESPIRATION RATE: 16 BRPM | DIASTOLIC BLOOD PRESSURE: 97 MMHG | HEART RATE: 105 BPM | SYSTOLIC BLOOD PRESSURE: 130 MMHG

## 2024-07-25 DIAGNOSIS — Z00.8 ENCOUNTER FOR MEDICAL ASSESSMENT: Primary | ICD-10-CM

## 2024-07-25 DIAGNOSIS — G89.29 OTHER CHRONIC PAIN: Primary | ICD-10-CM

## 2024-07-25 DIAGNOSIS — Z76.5 MALINGERING: ICD-10-CM

## 2024-07-25 PROCEDURE — 99282 EMERGENCY DEPT VISIT SF MDM: CPT

## 2024-07-25 NOTE — ED PROVIDER NOTES
[facility] EMERGENCY DEPARTMENT ENCOUNTER      Pt Name: Jose Kaplan  MRN: [id number]  Birthdate 1990  Date of evaluation: 7/25/24    CHIEF COMPLAINT       Chief Complaint   Patient presents with    Chronic Pain     Hand, feet, shoulder       HISTORY OF PRESENT ILLNESS   Jose Kaplan is a 33 y.o. male who presents with chronic hand and foot pain  Well-known to the emergency department.  Multiple ER visits.    PASTMEDICAL HISTORY     Past Medical History:   Diagnosis Date    Anxiety     Arthritis     Asthma     Bipolar I disorder, most recent episode (or current) depressed, unspecified 9/12/2014    Clostridium difficile infection     COPD (chronic obstructive pulmonary disease) ([facility])     Depression     Disease of blood and blood forming organ     Eczema     Fracture, metacarpal     R 4th and 5th    Gastric ulcer     Gastritis 06/13/2018    GERD (gastroesophageal reflux disease)     GI bleed     H. 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[facility])     Substance abuse ([facility])     Hx of opiates, benzos, cocaine, alcohol abuse    Suicidal ideation     Suicide attempt by hanging ([facility])     Tobacco dependence     Ulcerative colitis ([facility])     UTI (urinary tract infection)      Past Problem List  Patient Active Problem List   Diagnosis Code    Opioid abuse ([facility]) F11.10    Noncompliance Z91.199    Rectal bleeding K62.5    Smoker F17.200    Juvenile rheumatoid arthritis ([facility]) M08.00    SRIRAM (obstructive sleep apnea) G47.33    Primary insomnia F51.01    Calculus of bile duct with acute on chronic cholecystitis K80.46    Mild intermittent asthma without complication J45.20    Gastritis

## 2024-07-25 NOTE — ED NOTES
Pt wanted to use bathroom in lobby before getting vitals. Pt currently in bathroom and will be roomed to room 3.

## 2024-07-26 ENCOUNTER — HOSPITAL ENCOUNTER (EMERGENCY)
Age: 34
Discharge: HOME | End: 2024-07-26
Payer: MEDICARE

## 2024-07-26 VITALS
SYSTOLIC BLOOD PRESSURE: 134 MMHG | TEMPERATURE: 97.2 F | DIASTOLIC BLOOD PRESSURE: 78 MMHG | OXYGEN SATURATION: 98 % | RESPIRATION RATE: 18 BRPM | HEART RATE: 88 BPM

## 2024-07-26 DIAGNOSIS — M79.10 MYALGIA: ICD-10-CM

## 2024-07-26 DIAGNOSIS — M79.671 RIGHT FOOT PAIN: Primary | ICD-10-CM

## 2024-07-26 PROCEDURE — 99283 EMERGENCY DEPT VISIT LOW MDM: CPT

## 2024-07-26 PROCEDURE — 6370000000 HC RX 637 (ALT 250 FOR IP)

## 2024-07-26 RX ORDER — CYCLOBENZAPRINE HCL 10 MG
10 TABLET ORAL ONCE
Status: COMPLETED | OUTPATIENT
Start: 2024-07-26 | End: 2024-07-26

## 2024-07-26 RX ORDER — ACETAMINOPHEN 500 MG
1000 TABLET ORAL ONCE
Status: COMPLETED | OUTPATIENT
Start: 2024-07-26 | End: 2024-07-26

## 2024-07-26 RX ADMIN — ACETAMINOPHEN 1000 MG: 500 TABLET ORAL at 21:36

## 2024-07-26 RX ADMIN — CYCLOBENZAPRINE 10 MG: 10 TABLET, FILM COATED ORAL at 21:36

## 2024-07-26 ASSESSMENT — PAIN - FUNCTIONAL ASSESSMENT: PAIN_FUNCTIONAL_ASSESSMENT: 0-10

## 2024-07-26 ASSESSMENT — PAIN DESCRIPTION - LOCATION: LOCATION: FOOT

## 2024-07-26 ASSESSMENT — PAIN SCALES - GENERAL
PAINLEVEL_OUTOF10: 0
PAINLEVEL_OUTOF10: 0

## 2024-07-26 NOTE — ED PROVIDER NOTES
PM      OUTPATIENT FOLLOW UP THE PATIENT:  No follow-up provider specified.    DISCHARGE MEDICATIONS:  Discharge Medication List as of 7/25/2024  9:53 PM          Topher Berman DO  EmergencyMedicine Attending    (Please note that portions of this note were completed with a voice recognition program.  Efforts were made to edit the dictations but occasionally words are mis-transcribed.)     Topher Berman DO  07/26/24 8378

## 2024-07-27 NOTE — ED PROVIDER NOTES
[facility] ED  [address] [address]  Phone: [phone]        Pt Name: Jose Kaplan  MRN: [id number]  Birthdate 1990  Date of evaluation: 7/26/24    CHIEFCOMPLAINT       Chief Complaint   Patient presents with    Insect Bite     Pt believes he got bitten by bugs on his feet        HISTORY OF PRESENT ILLNESS (Location/Symptom, Timing/Onset, Context/Setting, Quality, Duration, Modifying Factors, Severity)      Jose Kaplan is a 33 y.o. male with no pertinent PMH who presents to the ED via private auto with ***     PAST MEDICAL / SURGICAL / SOCIAL / FAMILY HISTORY     PMH:  has a past medical history of Anxiety, Arthritis, Asthma, Bipolar I disorder, most recent episode (or current) depressed, unspecified, Clostridium difficile infection, COPD (chronic obstructive pulmonary disease) ([facility]), Depression, Disease of blood and blood forming organ, Eczema, Fracture, metacarpal, Gastric ulcer, Gastritis, GERD (gastroesophageal reflux disease), GI bleed, H. pylori infection, H/O blood clots, Head injury, Headache, Insomnia, Juvenile rheumatoid arthritis (HCC), Neuromuscular disorder (HCC), PFAPA syndrome (HCC), PUD (peptic ulcer disease), Rheumatoid arthritis (HCC), Rheumatoid arthritis(714.0), Severe recurrent major depression without psychotic features (HCC), Sleep apnea, Still's disease (HCC), Substance abuse (HCC), Suicidal ideation, Suicide attempt by hanging ([facility]), Tobacco dependence, Ulcerative colitis (HCC), and UTI (urinary tract infection).  Surgical History:  has a past surgical history that includes Colonoscopy; bronchoscopy; other surgical history; Upper gastrointestinal endoscopy (2/4/16); pr egd transoral biopsy single/multiple (N/A, 3/20/2017); sigmoidoscopy (N/A, 3/20/2017); Cholecystectomy, laparoscopic (07/14/2017); pr esophagogastroduodenoscopy transoral diagnostic (N/A, 8/9/2017); pr colonoscopy w/biopsy single/multiple (8/9/2017); Abdomen surgery; Upper  Respirations: 18      RE-EVALUATION:  See ED Course notes above.    DISCHARGE PLANNING:    The patient and/or family and I have discussed the diagnosis and risks, and we agree with discharging home to follow-up with their pertinent providers. The patient appears stable for discharge and has been instructed to return immediately for new concerning symptoms or if the symptoms worsen in any way. The patient understands that at this time there is no evidence for a more malignant underlying process, but the patient also understands that early in the process of an illness or injury, an emergency department workup can be falsely reassuring. Routine discharge counseling was given, and the patient understands that worsening, changing or persistent symptoms should prompt an immediate call or follow up with their primary physician or return to the emergency department.    I have reviewed the disposition diagnosis with the patient and or their family/guardian. I have answered their questions and given discharge instructions. They voiced understanding of these instructions and did not have any further questions or complaints.         CONSULTS:  None    PROCEDURES:  None    FINAL IMPRESSION      1. Right foot pain    2. Myalgia          DISPOSITION / PLAN     CONDITION ON DISPOSITION:   Good / Stable for discharge.     PATIENT REFERRED TO:  [facility] ED  [address] [address]  [phone]  Go to   New or worsening symptoms    call to establish care with primary care doctor  [phone]  Call         DISCHARGE MEDICATIONS:  Discharge Medication List as of 7/26/2024  9:29 PM          MASSIEL Atkinson CNP   Emergency Medicine Nurse Practitioner    (Please note that portions of this note were completed with a voice recognition program.  Efforts were made to edit the dictations but occasionally words aremis-transcribed.)'     Delfina Cha APRN - CNP  08/04/24 1019

## 2024-07-27 NOTE — DISCHARGE INSTRUCTIONS
Please establish care with a primary care provider.     You were given a dose of flexeril in the ER today. WARNING:  May cause drowsiness.  May impair ability to operate vehicles or machinery.  Do not use in combination with alcohol.       PLEASE RETURN TO THE EMERGENCY DEPARTMENT IMMEDIATELY if your symptoms worsen in anyway or in 8-12 hours if not improved for re-evaluation.  You should immediately return to the ER for symptoms such as increasing pain, bloody stool, fever, a feeling of passing out, light headed, dizziness, chest pain, shortness of breath, persistent nausea and/or vomiting, numbness or weakness to the arms or legs, coolness or color change of the arms or legs.      Take your medication as indicated and prescribed.  If you are given an antibiotic then, make sure you get the prescription filled and take the antibiotics until finished.        On behalf of the Emergency Department staff at [facility], I would like to thank you for giving us the opportunity to address your health care needs and concerns.    We hope that during your visit, our service was delivered in a professional and caring manner. Please keep [facility] in mind as we walk with you down the path to your own personal wellness.     Please understand that early in the process of an illness or injury, an emergency department workup can be falsely reassuring.  If you notice any worsening, changing or persistent symptoms please call your family doctor or return to the ER immediately.

## 2024-07-27 NOTE — ED NOTES
Pt presents to ED w/ complaints of possible bug bites on his feet. Pt reports that he was walking through some tall grass and has been itching ever since. Unable to assess the bites or rash as patient refuses to remove shoes.

## 2024-07-29 ENCOUNTER — HOSPITAL ENCOUNTER (EMERGENCY)
Age: 34
Discharge: HOME | End: 2024-07-29
Attending: EMERGENCY MEDICINE
Payer: MEDICARE

## 2024-07-29 VITALS
RESPIRATION RATE: 16 BRPM | WEIGHT: 180 LBS | HEART RATE: 103 BPM | SYSTOLIC BLOOD PRESSURE: 154 MMHG | OXYGEN SATURATION: 97 % | BODY MASS INDEX: 28.19 KG/M2 | TEMPERATURE: 97.7 F | DIASTOLIC BLOOD PRESSURE: 76 MMHG

## 2024-07-29 VITALS
RESPIRATION RATE: 18 BRPM | SYSTOLIC BLOOD PRESSURE: 133 MMHG | HEART RATE: 114 BPM | OXYGEN SATURATION: 96 % | DIASTOLIC BLOOD PRESSURE: 98 MMHG | TEMPERATURE: 97.5 F

## 2024-07-29 DIAGNOSIS — G89.29 CHRONIC PAIN OF BOTH FEET: ICD-10-CM

## 2024-07-29 DIAGNOSIS — M79.672 CHRONIC PAIN OF BOTH FEET: ICD-10-CM

## 2024-07-29 DIAGNOSIS — R11.0 NAUSEA: Primary | ICD-10-CM

## 2024-07-29 DIAGNOSIS — M79.671 CHRONIC PAIN OF BOTH FEET: ICD-10-CM

## 2024-07-29 DIAGNOSIS — Z76.0 ENCOUNTER FOR MEDICATION REFILL: Primary | ICD-10-CM

## 2024-07-29 PROCEDURE — 99283 EMERGENCY DEPT VISIT LOW MDM: CPT

## 2024-07-29 PROCEDURE — 6370000000 HC RX 637 (ALT 250 FOR IP): Performed by: EMERGENCY MEDICINE

## 2024-07-29 RX ORDER — QUETIAPINE FUMARATE 100 MG/1
200 TABLET, FILM COATED ORAL NIGHTLY
Qty: 10 TABLET | Refills: 0 | Status: SHIPPED | OUTPATIENT
Start: 2024-07-29 | End: 2024-08-03

## 2024-07-29 RX ORDER — CYCLOBENZAPRINE HCL 10 MG
10 TABLET ORAL ONCE
Status: COMPLETED | OUTPATIENT
Start: 2024-07-29 | End: 2024-07-29

## 2024-07-29 RX ORDER — ALPRAZOLAM 0.5 MG/1
0.5 TABLET ORAL 2 TIMES DAILY PRN
Qty: 6 TABLET | Refills: 0 | Status: SHIPPED | OUTPATIENT
Start: 2024-07-29 | End: 2024-08-01

## 2024-07-29 RX ORDER — ONDANSETRON 4 MG/1
4 TABLET, ORALLY DISINTEGRATING ORAL ONCE
Status: COMPLETED | OUTPATIENT
Start: 2024-07-29 | End: 2024-07-29

## 2024-07-29 RX ADMIN — CYCLOBENZAPRINE 10 MG: 10 TABLET, FILM COATED ORAL at 21:14

## 2024-07-29 RX ADMIN — ONDANSETRON 4 MG: 4 TABLET, ORALLY DISINTEGRATING ORAL at 21:14

## 2024-07-29 ASSESSMENT — PAIN - FUNCTIONAL ASSESSMENT
PAIN_FUNCTIONAL_ASSESSMENT: NONE - DENIES PAIN
PAIN_FUNCTIONAL_ASSESSMENT: 0-10

## 2024-07-29 ASSESSMENT — PAIN SCALES - GENERAL
PAINLEVEL_OUTOF10: 5
PAINLEVEL_OUTOF10: 5

## 2024-07-29 NOTE — ED PROVIDER NOTES
mouth daily    TIZANIDINE (ZANAFLEX) 2 MG CAPSULE    Take 1 capsule by mouth 3 times daily    TRAZODONE (DESYREL) 50 MG TABLET    Take 1 tablet by mouth nightly as needed for Sleep       PAST MEDICAL HISTORY         Diagnosis Date    Anxiety     Arthritis     Asthma     Bipolar I disorder, most recent episode (or current) depressed, unspecified 9/12/2014    Clostridium difficile infection     COPD (chronic obstructive pulmonary disease) (HCC)     Depression     Disease of blood and blood forming organ     Eczema     Fracture, metacarpal     R 4th and 5th    Gastric ulcer     Gastritis 06/13/2018    GERD (gastroesophageal reflux disease)     GI bleed     H. 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[facility])     PUD (peptic ulcer disease)     Rheumatoid arthritis ([facility])     Rheumatoid arthritis(714.0)     Severe recurrent major depression without psychotic features ([facility]) 11/21/2021    Sleep apnea     Still's disease ([facility])     Substance abuse ([facility])     Hx of opiates, benzos, cocaine, alcohol abuse    Suicidal ideation     Suicide attempt by hanging ([facility])     Tobacco dependence     Ulcerative colitis ([facility])     UTI (urinary tract infection)        SURGICAL HISTORY           Procedure Laterality Date    ABDOMEN SURGERY      upper GI scope 7/7/2015    BRONCHOSCOPY      CHOLECYSTECTOMY, LAPAROSCOPIC  07/14/2017    surgery performed at [facility]    COLONOSCOPY      ENDOSCOPY, COLON, DIAGNOSTIC      OTHER SURGICAL HISTORY      lumbar puncture    NV COLONOSCOPY W/BIOPSY SINGLE/MULTIPLE  8/9/2017    COLONOSCOPY WITH BIOPSY performed by Priyanka Mojica MD at [facility]    NV EGD TRANSORAL BIOPSY SINGLE/MULTIPLE N/A 3/20/2017    EGD BIOPSY performed by Jacobo Villalobos MD at [facility]    NV ESOPHAGOGASTRODUODENOSCOPY TRANSORAL DIAGNOSTIC N/A 8/9/2017    EGD ESOPHAGOGASTRODUODENOSCOPY

## 2024-07-29 NOTE — DISCHARGE INSTRUCTIONS
Take meds as prescribed.  Follow outpatient tomorrow with doctor or by calling [phone] or [phone].   Keep appointment at [facility] tomorrow.  Return to ER immediately if symptoms worsen or persist.

## 2024-07-29 NOTE — ED NOTES
Pt presents to ED for Seroquel refill. Pt's HR was originally noted to be 140 during triage.  RN reassessed and is now down to 103. DINA Titus notified.

## 2024-07-30 NOTE — ED PROVIDER NOTES
Normocephalic and atraumatic.      Nose: Nose normal. No rhinorrhea.      Mouth/Throat:      Mouth: Mucous membranes are moist.      Pharynx: No oropharyngeal exudate or posterior oropharyngeal erythema.   Eyes:      Extraocular Movements: Extraocular movements intact.      Conjunctiva/sclera: Conjunctivae normal.      Pupils: Pupils are equal, round, and reactive to light.   Cardiovascular:      Rate and Rhythm: Normal rate and regular rhythm.      Pulses: Normal pulses.      Heart sounds: Normal heart sounds. No murmur heard.  Pulmonary:      Effort: Pulmonary effort is normal. No respiratory distress.      Breath sounds: Normal breath sounds. No wheezing or rhonchi.   Abdominal:      General: Bowel sounds are normal.      Palpations: Abdomen is soft. There is no mass.      Tenderness: There is no abdominal tenderness. There is no right CVA tenderness, left CVA tenderness, guarding or rebound.   Musculoskeletal:         General: Normal range of motion.      Cervical back: No rigidity.   Skin:     General: Skin is warm and dry.      Capillary Refill: Capillary refill takes less than 2 seconds.   Neurological:      General: No focal deficit present.      Mental Status: He is alert and oriented to person, place, and time.   Psychiatric:         Mood and Affect: Mood normal.         Thought Content: Thought content normal.         MEDICAL DECISION MAKING/ED Course:     - po flexeril  - po zofran    33-year-old male presents to the ED for evaluation of nausea, chronic bilateral foot pain.  On presentation patient is slightly tachycardic but vitals are otherwise stable, he has a benign abdomen, no concerning findings on exam of his feet.  Differential diagnosis includes flare of chronic foot pain, gastroenteritis.  Low concern for acute surgical abdominal process.  Patient given a Flexeril and Zofran, allowed to rest in the ED, he was discharged in stable condition with instructions to follow-up with PCP.    DIAGNOSTIC

## 2024-08-14 ENCOUNTER — HOSPITAL ENCOUNTER (EMERGENCY)
Age: 34
Discharge: HOME | End: 2024-08-14
Attending: EMERGENCY MEDICINE
Payer: MEDICARE

## 2024-08-14 VITALS
TEMPERATURE: 99.7 F | WEIGHT: 200 LBS | DIASTOLIC BLOOD PRESSURE: 87 MMHG | RESPIRATION RATE: 16 BRPM | BODY MASS INDEX: 31.32 KG/M2 | HEART RATE: 112 BPM | SYSTOLIC BLOOD PRESSURE: 131 MMHG | OXYGEN SATURATION: 98 %

## 2024-08-14 DIAGNOSIS — F41.9 ANXIETY: ICD-10-CM

## 2024-08-14 DIAGNOSIS — Z76.0 ENCOUNTER FOR MEDICATION REFILL: Primary | ICD-10-CM

## 2024-08-14 PROCEDURE — 99283 EMERGENCY DEPT VISIT LOW MDM: CPT

## 2024-08-14 RX ORDER — QUETIAPINE FUMARATE 100 MG/1
200 TABLET, FILM COATED ORAL NIGHTLY
Qty: 6 TABLET | Refills: 0 | Status: SHIPPED | OUTPATIENT
Start: 2024-08-14 | End: 2024-08-17

## 2024-08-14 RX ORDER — ALPRAZOLAM 0.5 MG/1
0.5 TABLET ORAL 2 TIMES DAILY PRN
Qty: 4 TABLET | Refills: 0 | Status: SHIPPED | OUTPATIENT
Start: 2024-08-14 | End: 2024-08-16

## 2024-08-14 RX ORDER — CYCLOBENZAPRINE HCL 10 MG
10 TABLET ORAL 3 TIMES DAILY PRN
Qty: 9 TABLET | Refills: 0 | Status: SHIPPED | OUTPATIENT
Start: 2024-08-14 | End: 2024-08-17

## 2024-08-14 ASSESSMENT — PAIN - FUNCTIONAL ASSESSMENT: PAIN_FUNCTIONAL_ASSESSMENT: NONE - DENIES PAIN

## 2024-08-14 ASSESSMENT — ENCOUNTER SYMPTOMS
ABDOMINAL PAIN: 0
SHORTNESS OF BREATH: 0

## 2024-08-14 NOTE — DISCHARGE INSTRUCTIONS
Take medications as prescribed.  Xanax and Flexeril can cause drowsiness.  Follow-up with your psychiatrist 2 days.  Return to emergency department for worsening or new symptoms.

## 2024-08-16 NOTE — ED PROVIDER NOTES
[facility] ED  eMERGENCY dEPARTMENT eNCOUnter      Pt Name: Jose Kaplan  MRN: [id number]  Birthdate 1990  Date of evaluation: 8/14/2024  Provider: MASSIEL Arteaga CNP    CHIEF COMPLAINT       Chief Complaint   Patient presents with    Medication Refill     Xanax 0.5mg, flexeril 10mg and seroquel 100mg         HISTORY OF PRESENT ILLNESS  (Location/Symptom, Timing/Onset, Context/Setting, Quality, Duration, Modifying Factors, Severity.)   Jose Kaplan is a 33 y.o. male who presents to the emergency department for medication refill.  Patient is requesting refill of his Xanax, Flexeril and Seroquel.  Patient states he now for several days.  States [facility] and waiting to [facility] his doctor.  Denies suicidal or homicidal ideations.  History of anxiety bipolar disorder.  Frequent ER visits.  Patient has a history of chronic foot and hand pain but denies worsening symptoms today.      Nursing Notes were reviewed.    ALLERGIES     Dicyclomine, Famotidine, Geodon [ziprasidone hcl], Haloperidol, Iv dye [iodides], Reglan [metoclopramide], Ibuprofen, Aspirin, Dicyclomine hcl, Hydroxyzine hcl, Iodine, Metronidazole, Mirtazapine, Ondansetron hcl, Promethazine, and Ziprasidone    CURRENT MEDICATIONS       Previous Medications    ACETAMINOPHEN (TYLENOL) 500 MG TABLET    Take 2 tablets by mouth 3 times daily as needed for Pain    CLOTRIMAZOLE-BETAMETHASONE (LOTRISONE) 1-0.05 % CREAM    Apply topically 2 times daily to feet.    DIPHENHYDRAMINE (BENADRYL) 25 MG CAPSULE    Take 1 capsule by mouth nightly as needed for Itching or Sleep    IBUPROFEN (ADVIL;MOTRIN) 600 MG TABLET    Take 1 tablet by mouth every 6 hours as needed for Pain    METFORMIN (GLUCOPHAGE) 500 MG TABLET    Take 1 tablet by mouth 2 times daily (with meals) Indications: last dispensed 1/6/23    NALOXONE 4 MG/0.1ML LIQD NASAL SPRAY        ONDANSETRON (ZOFRAN) 4 MG TABLET    Take 1 tablet by mouth every 8 hours as needed for 
Disp-30 tablet, R-0Normal      acetaminophen (TYLENOL) 500 MG tablet Take 2 tablets by mouth 3 times daily as needed for Pain, Disp-180 tablet, R-0Print      clotrimazole-betamethasone (LOTRISONE) 1-0.05 % cream Apply topically 2 times daily to feet., Disp-45 g, R-0, Print      ondansetron (ZOFRAN-ODT) 4 MG disintegrating tablet Take 1 tablet by mouth 3 times daily as needed for Nausea or Vomiting, Disp-21 tablet, R-0Normal      ondansetron (ZOFRAN) 4 MG tablet Take 1 tablet by mouth every 8 hours as needed for Nausea or Vomiting, Disp-12 tablet, R-0Print      metFORMIN (GLUCOPHAGE) 500 MG tablet Take 1 tablet by mouth 2 times daily (with meals) Indications: last dispensed 1/6/23, Disp-60 tablet, R-0Print      naloxone 4 MG/0.1ML LIQD nasal spray Historical Med           ALLERGIES     is allergic to dicyclomine, famotidine, geodon [ziprasidone hcl], haloperidol, iv dye [iodides], reglan [metoclopramide], ibuprofen, aspirin, dicyclomine hcl, hydroxyzine hcl, iodine, metronidazole, mirtazapine, ondansetron hcl, promethazine, and ziprasidone.  FAMILY HISTORY     He indicated that his mother is alive. He indicated that his father is alive. He indicated that the status of his sister is unknown. He indicated that the status of his other is unknown. He indicated that the status of his paternal cousin is unknown.     SOCIAL HISTORY       Social History     Tobacco Use    Smoking status: Every Day     Current packs/day: 0.50     Average packs/day: 0.5 packs/day for 15.0 years (7.5 ttl pk-yrs)     Types: Cigarettes    Smokeless tobacco: Never   Vaping Use    Vaping status: Every Day    Substances: Nicotine   Substance Use Topics    Alcohol use: Not Currently     Comment: drinks daily    Drug use: Not Currently     Types: Cocaine     Comment: Hx of opiates, benzos, cocaine, alcohol abuse          Erica B Goldberger, MD  Attending Emergency Physician       Goldberger, Erica B, MD  08/16/24 [id number]

## 2024-08-27 ENCOUNTER — HOSPITAL ENCOUNTER (EMERGENCY)
Age: 34
Discharge: HOME | End: 2024-08-27
Attending: EMERGENCY MEDICINE
Payer: COMMERCIAL

## 2024-08-27 VITALS
DIASTOLIC BLOOD PRESSURE: 95 MMHG | RESPIRATION RATE: 18 BRPM | OXYGEN SATURATION: 98 % | HEART RATE: 136 BPM | SYSTOLIC BLOOD PRESSURE: 135 MMHG

## 2024-08-27 VITALS
RESPIRATION RATE: 20 BRPM | HEART RATE: 128 BPM | SYSTOLIC BLOOD PRESSURE: 135 MMHG | BODY MASS INDEX: 31.32 KG/M2 | WEIGHT: 200 LBS | DIASTOLIC BLOOD PRESSURE: 92 MMHG | OXYGEN SATURATION: 100 %

## 2024-08-27 DIAGNOSIS — F41.9 ANXIETY: Primary | ICD-10-CM

## 2024-08-27 DIAGNOSIS — Z76.0 ENCOUNTER FOR MEDICATION REFILL: ICD-10-CM

## 2024-08-27 DIAGNOSIS — S60.312A ABRASION OF LEFT THUMB, INITIAL ENCOUNTER: Primary | ICD-10-CM

## 2024-08-27 PROCEDURE — 99282 EMERGENCY DEPT VISIT SF MDM: CPT

## 2024-08-27 PROCEDURE — 99283 EMERGENCY DEPT VISIT LOW MDM: CPT

## 2024-08-27 RX ORDER — CYCLOBENZAPRINE HCL 10 MG
10 TABLET ORAL 3 TIMES DAILY PRN
Qty: 21 TABLET | Refills: 0 | Status: SHIPPED | OUTPATIENT
Start: 2024-08-27 | End: 2024-09-06

## 2024-08-27 RX ORDER — ALPRAZOLAM 0.5 MG
0.5 TABLET ORAL 3 TIMES DAILY PRN
Qty: 21 TABLET | Refills: 0 | Status: SHIPPED | OUTPATIENT
Start: 2024-08-27 | End: 2024-09-03

## 2024-08-27 RX ORDER — QUETIAPINE FUMARATE 100 MG/1
200 TABLET, FILM COATED ORAL NIGHTLY
Qty: 14 TABLET | Refills: 0 | Status: SHIPPED | OUTPATIENT
Start: 2024-08-27 | End: 2024-09-03

## 2024-08-27 ASSESSMENT — PAIN SCALES - GENERAL: PAINLEVEL_OUTOF10: 4

## 2024-08-27 ASSESSMENT — PAIN DESCRIPTION - DESCRIPTORS: DESCRIPTORS: SORE

## 2024-08-27 ASSESSMENT — PAIN DESCRIPTION - ORIENTATION: ORIENTATION: LEFT

## 2024-08-27 ASSESSMENT — PAIN DESCRIPTION - LOCATION: LOCATION: FINGER (COMMENT WHICH ONE)

## 2024-08-27 ASSESSMENT — PAIN - FUNCTIONAL ASSESSMENT
PAIN_FUNCTIONAL_ASSESSMENT: 0-10
PAIN_FUNCTIONAL_ASSESSMENT: NONE - DENIES PAIN

## 2024-08-27 NOTE — DISCHARGE INSTRUCTIONS
Take meds as prescribed.  Follow outpatient tomorrow with doctor or by calling [phone] or [phone].   Return to ER immediately if symptoms worsen or persist.

## 2024-08-27 NOTE — DISCHARGE INSTRUCTIONS
If you have any concerning symptoms come back to the ER.  Take your medications as prescribed.  Follow-up with your PCP and psychiatrist.

## 2024-08-27 NOTE — ED PROVIDER NOTES
(BENADRYL) 25 MG capsule Take 1 capsule by mouth nightly as needed for Itching or Sleep, Disp-1 capsule, R-0Normal      sertraline (ZOLOFT) 25 MG tablet Take 1 tablet by mouth daily, Disp-30 tablet, R-3Normal      traZODone (DESYREL) 50 MG tablet Take 1 tablet by mouth nightly as needed for Sleep, Disp-30 tablet, R-0Normal      acetaminophen (TYLENOL) 500 MG tablet Take 2 tablets by mouth 3 times daily as needed for Pain, Disp-180 tablet, R-0Print      clotrimazole-betamethasone (LOTRISONE) 1-0.05 % cream Apply topically 2 times daily to feet., Disp-45 g, R-0, Print      ondansetron (ZOFRAN-ODT) 4 MG disintegrating tablet Take 1 tablet by mouth 3 times daily as needed for Nausea or Vomiting, Disp-21 tablet, R-0Normal      ondansetron (ZOFRAN) 4 MG tablet Take 1 tablet by mouth every 8 hours as needed for Nausea or Vomiting, Disp-12 tablet, R-0Print      metFORMIN (GLUCOPHAGE) 500 MG tablet Take 1 tablet by mouth 2 times daily (with meals) Indications: last dispensed 1/6/23, Disp-60 tablet, R-0Print      naloxone 4 MG/0.1ML LIQD nasal spray Historical Med             PAST MEDICAL HISTORY         Diagnosis Date    Anxiety     Arthritis     Asthma     Bipolar I disorder, most recent episode (or current) depressed, unspecified 9/12/2014    Clostridium difficile infection     COPD (chronic obstructive pulmonary disease) ([facility])     Depression     Disease of blood and blood forming organ     Eczema     Fracture, metacarpal     R 4th and 5th    Gastric ulcer     Gastritis 06/13/2018    GERD (gastroesophageal reflux disease)     GI bleed     H. 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[facility])     Neuromuscular disorder ([facility])     PFAPA syndrome ([facility])     PUD (peptic ulcer disease)     Rheumatoid arthritis ([facility])     Rheumatoid arthritis(714.0)     Severe recurrent major depression without psychotic features ([facility]) 11/21/2021    Sleep apnea     Still's disease      Family Status   Relation Name Status    Father  Alive    Mother  Alive    Brother  (Not Specified)    Other  (Not Specified)    Brother  (Not Specified)    PCousin  (Not Specified)    Sister  (Not Specified)   No partnership data on file        SOCIAL HISTORY      reports that he has been smoking cigarettes. He has a 7.5 pack-year smoking history. He has never used smokeless tobacco. He reports that he does not currently use alcohol. He reports that he does not currently use drugs after having used the following drugs: Cocaine.    REVIEW OFSYSTEMS    (2-9 systems for level 4, 10 or more for level 5)   Review of Systems    Except as noted above the remainder of the review of systems was reviewed and negative.     PHYSICAL EXAM    (up to 7 for level 4, 8 or more for level 5)     ED Triage Vitals [08/27/24 1710]   BP Systolic BP Percentile Diastolic BP Percentile Temp Temp src Pulse Respirations SpO2   (!) 135/92 -- -- -- -- (!) 128 20 100 %      Height Weight - Scale         -- 90.7 kg (200 lb)           Physical Exam  Constitutional:       Appearance: He is well-developed.   HENT:      Head: Normocephalic and atraumatic.   Cardiovascular:      Rate and Rhythm: Normal rate and regular rhythm.   Pulmonary:      Effort: Pulmonary effort is normal.      Breath sounds: Normal breath sounds.   Abdominal:      Palpations: Abdomen is soft.   Musculoskeletal:         General: Normal range of motion.        Hands:       Cervical back: Normal range of motion and neck supple.   Skin:     General: Skin is warm.   Neurological:      Mental Status: He is alert and oriented to person, place, and time.   Psychiatric:         Behavior: Behavior normal.                 DIAGNOSTIC RESULTS     EKG: All EKG's are interpreted by the Emergency Department Physician who either signs or Co-signs this chart in the absence of a cardiologist.        RADIOLOGY:   Non-plain film images such as CT, Ultrasound and MRI are read by the radiologist.

## 2024-08-27 NOTE — ED NOTES
Pt to er with c/o cut to left hand. Pt states he does not want me to look at his cut and that he will let the doctor look. When asked how pt injured his hand pt states he didn't know. Pt a&ox3. Skin warm and dry. Respirations even and non-labored.

## 2024-08-28 NOTE — ED PROVIDER NOTES
EMERGENCY DEPARTMENT ENCOUNTER    Pt Name: Jose Kaplan  MRN: [id number]  Birthdate 1990  Date of evaluation: 8/28/24  CHIEF COMPLAINT       Chief Complaint   Patient presents with    Medication Refill     Pt reports needing refill on Seroquel. Reports being out for 1wk.     HISTORY OF PRESENT ILLNESS   HPI  33-year-old male with a history of bipolar, COPD, RA, frequent ED visits presents to the ED requesting refills of his Seroquel, Flexeril, Xanax.  Patient states that he is very anxious today.  He has no acute complaints, denies chest pain, shortness of breath, fever/chills.    REVIEW OF SYSTEMS     Review of Systems   All other systems reviewed and are negative.    PASTMEDICAL HISTORY     Past Medical History:   Diagnosis Date    Anxiety     Arthritis     Asthma     Bipolar I disorder, most recent episode (or current) depressed, unspecified 9/12/2014    Clostridium difficile infection     COPD (chronic obstructive pulmonary disease) ([facility])     Depression     Disease of blood and blood forming organ     Eczema     Fracture, metacarpal     R 4th and 5th    Gastric ulcer     Gastritis 06/13/2018    GERD (gastroesophageal reflux disease)     GI bleed     H. 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[facility])     PUD (peptic ulcer disease)     Rheumatoid arthritis ([facility])     Rheumatoid arthritis(714.0)     Severe recurrent major depression without psychotic features ([facility]) 11/21/2021    Sleep apnea     Still's disease ([facility])     Substance abuse ([facility])     Hx of opiates, benzos, cocaine, alcohol abuse    Suicidal ideation     Suicide attempt by hanging ([facility])     Tobacco dependence     Ulcerative colitis ([facility])     UTI (urinary tract infection)      SURGICAL HISTORY       Past Surgical History:   Procedure Laterality Date    ABDOMEN SURGERY      upper GI scope 7/7/2015    BRONCHOSCOPY

## 2024-08-31 ENCOUNTER — HOSPITAL ENCOUNTER (EMERGENCY)
Age: 34
Discharge: HOME | End: 2024-09-01
Attending: STUDENT IN AN ORGANIZED HEALTH CARE EDUCATION/TRAINING PROGRAM
Payer: COMMERCIAL

## 2024-08-31 VITALS
BODY MASS INDEX: 31.39 KG/M2 | WEIGHT: 200 LBS | HEART RATE: 111 BPM | TEMPERATURE: 98.2 F | OXYGEN SATURATION: 98 % | SYSTOLIC BLOOD PRESSURE: 146 MMHG | RESPIRATION RATE: 16 BRPM | HEIGHT: 67 IN | DIASTOLIC BLOOD PRESSURE: 98 MMHG

## 2024-08-31 DIAGNOSIS — S60.321A BLISTER OF RIGHT THUMB, INITIAL ENCOUNTER: Primary | ICD-10-CM

## 2024-08-31 DIAGNOSIS — Z76.0 ENCOUNTER FOR MEDICATION REFILL: ICD-10-CM

## 2024-08-31 DIAGNOSIS — S60.322A: ICD-10-CM

## 2024-08-31 PROCEDURE — 99283 EMERGENCY DEPT VISIT LOW MDM: CPT

## 2024-08-31 RX ORDER — QUETIAPINE FUMARATE 100 MG/1
200 TABLET, FILM COATED ORAL NIGHTLY
Qty: 6 TABLET | Refills: 0 | Status: SHIPPED | OUTPATIENT
Start: 2024-08-31 | End: 2024-09-03

## 2024-08-31 RX ORDER — IBUPROFEN 800 MG/1
800 TABLET, FILM COATED ORAL ONCE
Status: DISCONTINUED | OUTPATIENT
Start: 2024-08-31 | End: 2024-09-01 | Stop reason: HOSPADM

## 2024-08-31 RX ORDER — IBUPROFEN 800 MG/1
800 TABLET, FILM COATED ORAL EVERY 8 HOURS PRN
Qty: 20 TABLET | Refills: 0 | Status: SHIPPED | OUTPATIENT
Start: 2024-08-31

## 2024-08-31 RX ORDER — CYCLOBENZAPRINE HCL 10 MG
10 TABLET ORAL 3 TIMES DAILY PRN
Qty: 10 TABLET | Refills: 0 | Status: SHIPPED | OUTPATIENT
Start: 2024-08-31 | End: 2024-09-03

## 2024-08-31 RX ORDER — CYCLOBENZAPRINE HCL 5 MG
5 TABLET ORAL ONCE
Status: DISCONTINUED | OUTPATIENT
Start: 2024-08-31 | End: 2024-09-01 | Stop reason: HOSPADM

## 2024-08-31 RX ORDER — QUETIAPINE FUMARATE 200 MG/1
200 TABLET, FILM COATED ORAL ONCE
Status: DISCONTINUED | OUTPATIENT
Start: 2024-08-31 | End: 2024-09-01 | Stop reason: HOSPADM

## 2024-08-31 ASSESSMENT — ENCOUNTER SYMPTOMS: COLOR CHANGE: 1

## 2024-08-31 ASSESSMENT — PAIN - FUNCTIONAL ASSESSMENT: PAIN_FUNCTIONAL_ASSESSMENT: NONE - DENIES PAIN

## 2024-08-31 NOTE — ED PROVIDER NOTES
eMERGENCY dEPARTMENT eNCOUnter   Independent Attestation     Pt Name: Jose Kaplan  MRN: [id number]  Birthdate 1990  Date of evaluation: 8/30/24     Jose Kaplan is a 33 y.o. male with CC: Abrasion (Pt reports cut to left thumb after being discharged earlier. Unsure what happened. No active bleeding.)        This visit was performed by both a physician and an APC. I performed all aspects of the MDM as documented.      Elroy You MD  Attending Emergency Physician            Elroy You MD  08/30/24 [id number]

## 2024-09-01 ENCOUNTER — HOSPITAL ENCOUNTER (EMERGENCY)
Age: 34
Discharge: HOME | End: 2024-09-01
Payer: COMMERCIAL

## 2024-09-01 VITALS
WEIGHT: 180 LBS | RESPIRATION RATE: 12 BRPM | HEIGHT: 68 IN | SYSTOLIC BLOOD PRESSURE: 123 MMHG | BODY MASS INDEX: 27.28 KG/M2 | OXYGEN SATURATION: 100 % | DIASTOLIC BLOOD PRESSURE: 85 MMHG | HEART RATE: 88 BPM | TEMPERATURE: 97.8 F

## 2024-09-01 DIAGNOSIS — E86.0 DEHYDRATION: Primary | ICD-10-CM

## 2024-09-01 PROCEDURE — 6370000000 HC RX 637 (ALT 250 FOR IP)

## 2024-09-01 PROCEDURE — 99283 EMERGENCY DEPT VISIT LOW MDM: CPT

## 2024-09-01 RX ORDER — ACETAMINOPHEN 325 MG/1
650 TABLET ORAL ONCE
Status: COMPLETED | OUTPATIENT
Start: 2024-09-01 | End: 2024-09-01

## 2024-09-01 RX ADMIN — ACETAMINOPHEN 650 MG: 325 TABLET ORAL at 18:10

## 2024-09-01 ASSESSMENT — PAIN DESCRIPTION - LOCATION: LOCATION: HAND

## 2024-09-01 ASSESSMENT — PAIN SCALES - GENERAL: PAINLEVEL_OUTOF10: 6

## 2024-09-01 ASSESSMENT — PAIN DESCRIPTION - DESCRIPTORS: DESCRIPTORS: ACHING

## 2024-09-01 NOTE — DISCHARGE INSTRUCTIONS
Take medications as prescribed.  Follow-up with primary care clinic and as well as have center in the next 2 to 3 days for reevaluation.  Return to emergency department for worsening or new symptoms.

## 2024-09-01 NOTE — ED PROVIDER NOTES
[facility] ED  eMERGENCY dEPARTMENT eNCOUnter      Pt Name: Jose Kaplan  MRN: [id number]  Birthdate 1990  Date of evaluation: 8/31/2024  Provider: MASSIEL Arteaga CNP    CHIEF COMPLAINT       Chief Complaint   Patient presents with    Hand Injury         HISTORY OF PRESENT ILLNESS  (Location/Symptom, Timing/Onset, Context/Setting, Quality, Duration, Modifying Factors, Severity.)   Jose Kaplan is a 33 y.o. male who presents to the emergency department for evaluation of blister to both of his thumbs has been going on for quite some time.  Denies fall or injury to his hands.  Patient also requested refill on his Flexeril, Seroquel and Motrin.  No suicidal ideations.      Nursing Notes were reviewed.    ALLERGIES     Dicyclomine, Famotidine, Geodon [ziprasidone hcl], Haloperidol, Iv dye [iodides], Reglan [metoclopramide], Ibuprofen, Aspirin, Dicyclomine hcl, Hydroxyzine hcl, Iodine, Metronidazole, Mirtazapine, Ondansetron hcl, Promethazine, and Ziprasidone    CURRENT MEDICATIONS       Previous Medications    ACETAMINOPHEN (TYLENOL) 500 MG TABLET    Take 2 tablets by mouth 3 times daily as needed for Pain    ALPRAZOLAM (XANAX) 0.5 MG TABLET    Take 1 tablet by mouth 3 times daily as needed for Sleep for up to 7 days. Max Daily Amount: 1.5 mg    CLOTRIMAZOLE-BETAMETHASONE (LOTRISONE) 1-0.05 % CREAM    Apply topically 2 times daily to feet.    CYCLOBENZAPRINE (FLEXERIL) 10 MG TABLET    Take 1 tablet by mouth 3 times daily as needed for Muscle spasms    DIPHENHYDRAMINE (BENADRYL) 25 MG CAPSULE    Take 1 capsule by mouth nightly as needed for Itching or Sleep    IBUPROFEN (ADVIL;MOTRIN) 600 MG TABLET    Take 1 tablet by mouth every 6 hours as needed for Pain    METFORMIN (GLUCOPHAGE) 500 MG TABLET    Take 1 tablet by mouth 2 times daily (with meals) Indications: last dispensed 1/6/23    NALOXONE 4 MG/0.1ML LIQD NASAL SPRAY        ONDANSETRON (ZOFRAN) 4 MG TABLET    Take 1 tablet by mouth every 8

## 2024-09-02 ASSESSMENT — ENCOUNTER SYMPTOMS
BACK PAIN: 0
SHORTNESS OF BREATH: 0
CHEST TIGHTNESS: 0
WHEEZING: 0
VOMITING: 0
COLOR CHANGE: 0
ABDOMINAL PAIN: 0
NAUSEA: 0

## 2024-09-02 NOTE — ED PROVIDER NOTES
light.   Cardiovascular:      Rate and Rhythm: Normal rate and regular rhythm.      Pulses: Normal pulses.      Heart sounds: Normal heart sounds. No murmur heard.  Pulmonary:      Effort: Pulmonary effort is normal. No respiratory distress.      Breath sounds: Normal breath sounds. No wheezing.   Abdominal:      General: Abdomen is flat.      Palpations: Abdomen is soft.   Musculoskeletal:         General: Normal range of motion.      Cervical back: Normal range of motion.   Skin:     General: Skin is warm and dry.      Capillary Refill: Capillary refill takes less than 2 seconds.   Neurological:      General: No focal deficit present.      Mental Status: He is alert and oriented to person, place, and time. Mental status is at baseline.      GCS: GCS eye subscore is 4. GCS verbal subscore is 5. GCS motor subscore is 6.      Cranial Nerves: No cranial nerve deficit.      Sensory: No sensory deficit.      Motor: No weakness.           DDX/DIAGNOSTIC RESULTS / EMERGENCY DEPARTMENT COURSE / MDM     Medical Decision Making  33-year-old male presents for evaluation of generalized fatigue and hunger.  Exam unremarkable.  Vitals within normal ranges.  Patient is in no distress.  Seen many times in our emergency department most recently last night.  I do not believe patient requires serum studies or imaging at this time.  He was given opportunity to rest in our emergency department and was provided with food and hydration.  Patient was stable upon discharge and ambulatory with no distress.    Risk  OTC drugs.              EMERGENCY DEPARTMENT COURSE:           Labs Reviewed - No data to display    PROCEDURES:    Procedures    CONSULTS:  None        FINAL IMPRESSION      1. Dehydration          DISPOSITION / PLAN     DISPOSITION Decision To Discharge 09/01/2024 06:22:19 PM  Condition at Disposition: Data Unavailable      PATIENT REFERRED TO:  call for primary doctor  [phone]  Call         DISCHARGE 
Cigarettes

## 2024-09-26 ENCOUNTER — HOSPITAL ENCOUNTER (EMERGENCY)
Age: 34
Discharge: HOME | End: 2024-09-27
Attending: EMERGENCY MEDICINE
Payer: MEDICARE

## 2024-09-26 DIAGNOSIS — Z76.0 ENCOUNTER FOR MEDICATION REFILL: Primary | ICD-10-CM

## 2024-09-26 PROCEDURE — 93005 ELECTROCARDIOGRAM TRACING: CPT | Performed by: EMERGENCY MEDICINE

## 2024-09-26 PROCEDURE — 99283 EMERGENCY DEPT VISIT LOW MDM: CPT

## 2024-09-27 ENCOUNTER — HOSPITAL ENCOUNTER (EMERGENCY)
Age: 34
Discharge: LEFT BEFORE DISCHARGE | End: 2024-09-27
Attending: EMERGENCY MEDICINE
Payer: MEDICARE

## 2024-09-27 VITALS
RESPIRATION RATE: 19 BRPM | HEART RATE: 145 BPM | DIASTOLIC BLOOD PRESSURE: 106 MMHG | SYSTOLIC BLOOD PRESSURE: 162 MMHG | WEIGHT: 180 LBS | OXYGEN SATURATION: 96 % | TEMPERATURE: 98.3 F | BODY MASS INDEX: 27.37 KG/M2

## 2024-09-27 DIAGNOSIS — Z76.0 ENCOUNTER FOR MEDICATION REFILL: Primary | ICD-10-CM

## 2024-09-27 LAB
EKG ATRIAL RATE: 150 BPM
EKG P AXIS: 69 DEGREES
EKG P-R INTERVAL: 128 MS
EKG Q-T INTERVAL: 246 MS
EKG QRS DURATION: 68 MS
EKG QTC CALCULATION (BAZETT): 388 MS
EKG R AXIS: 64 DEGREES
EKG T AXIS: 64 DEGREES
EKG VENTRICULAR RATE: 150 BPM

## 2024-09-27 PROCEDURE — 99281 EMR DPT VST MAYX REQ PHY/QHP: CPT

## 2024-09-27 PROCEDURE — 99283 EMERGENCY DEPT VISIT LOW MDM: CPT

## 2024-09-27 PROCEDURE — 93010 ELECTROCARDIOGRAM REPORT: CPT | Performed by: INTERNAL MEDICINE

## 2024-09-27 PROCEDURE — 6370000000 HC RX 637 (ALT 250 FOR IP): Performed by: EMERGENCY MEDICINE

## 2024-09-27 PROCEDURE — 4500000002 HC ER NO CHARGE

## 2024-09-27 RX ORDER — CYCLOBENZAPRINE HCL 10 MG
10 TABLET ORAL NIGHTLY PRN
Qty: 10 TABLET | Refills: 0 | Status: SHIPPED | OUTPATIENT
Start: 2024-09-27 | End: 2024-10-07

## 2024-09-27 RX ORDER — ALPRAZOLAM 0.5 MG
0.5 TABLET ORAL ONCE
Status: COMPLETED | OUTPATIENT
Start: 2024-09-27 | End: 2024-09-27

## 2024-09-27 RX ADMIN — ALPRAZOLAM 0.5 MG: 0.5 TABLET ORAL at 01:00

## 2024-09-27 ASSESSMENT — ENCOUNTER SYMPTOMS
CHEST TIGHTNESS: 0
BACK PAIN: 0
FACIAL SWELLING: 0
SHORTNESS OF BREATH: 0
EYE DISCHARGE: 0
ABDOMINAL PAIN: 0
ABDOMINAL DISTENTION: 0
EYE PAIN: 0

## 2024-09-27 NOTE — ED PROVIDER NOTES
EMERGENCY DEPARTMENT ENCOUNTER    Pt Name: Jose Kaplan  MRN: [id number]  Birthdate 1990  Date of evaluation: 9/27/24  CHIEF COMPLAINT       Chief Complaint   Patient presents with    Medication Refill     Needs med refill and ride      HISTORY OF PRESENT ILLNESS   HPI   The patient is a 33-year-old male with a history of schizophrenia and bipolar disorder who presented to the emergency department secondary to medication refill.  Patient is requesting a refill of his Flexeril as well as Xanax.  Patient had an appointment scheduled in December the misted.  Patient denies suicidal homicidal ideations.  Denies auditory or visual hallucinations.  He is requesting a medication refill as well as a cab ride home.  Patient denies chest pain, shortness of breath, nausea, vomiting, fevers or      REVIEW OF SYSTEMS     Review of Systems   Constitutional:  Negative for chills, diaphoresis and fever.   HENT:  Negative for congestion, ear pain and facial swelling.    Eyes:  Negative for pain, discharge and visual disturbance.   Respiratory:  Negative for chest tightness and shortness of breath.    Cardiovascular:  Negative for chest pain and palpitations.   Gastrointestinal:  Negative for abdominal distention and abdominal pain.   Genitourinary:  Negative for difficulty urinating and flank pain.   Musculoskeletal:  Negative for back pain.   Skin:  Negative for wound.   Neurological:  Negative for dizziness, light-headedness and headaches.     PASTMEDICAL HISTORY     Past Medical History:   Diagnosis Date    Anxiety     Arthritis     Asthma     Bipolar I disorder, most recent episode (or current) depressed, unspecified 9/12/2014    Clostridium difficile infection     COPD (chronic obstructive pulmonary disease) ([facility])     Depression     Disease of blood and blood forming organ     Eczema     Fracture, metacarpal     R 4th and 5th    Gastric ulcer     Gastritis 06/13/2018    GERD (gastroesophageal reflux disease)     GI bleed     Constitutional:       General: He is not in acute distress.     Appearance: He is well-developed. He is not diaphoretic.   HENT:      Head: Normocephalic and atraumatic.   Eyes:      Pupils: Pupils are equal, round, and reactive to light.   Cardiovascular:      Rate and Rhythm: Normal rate and regular rhythm.   Pulmonary:      Effort: Pulmonary effort is normal.      Breath sounds: Normal breath sounds.   Abdominal:      General: Bowel sounds are normal.      Palpations: Abdomen is soft.   Musculoskeletal:         General: Normal range of motion.      Cervical back: Normal range of motion and neck supple.   Skin:     General: Skin is warm.      Capillary Refill: Capillary refill takes less than 2 seconds.   Neurological:      Mental Status: He is alert and oriented to person, place, and time.         MEDICAL DECISION MAKING / ED COURSE:   Summary of Patient Presentation:    The patient is a 33-year-old male who presented to the emergency department secondary to medication refill and a cab ride home        1)  Number and Complexity of Problems Addressed at this Encounter  Problem List This Visit: Medication refill, transportation home    Differential Diagnosis: Medication refill    Diagnoses Considered but Do Not Suspect:  NA    Pertinent Comorbid Conditions:  NA        2)  Data Reviewed  My EKG interpretation:  NA     Decision Rules/Scores utilized:  NA    HEART SCORE: NA*       NIH STROKE SCALE         Tests considered but not ordered and why:  none    External Documents Reviewed:  none    Imaging that is independently reviewed and interpreted by me as:  none    SEPSIS    Is this patient to be included in the SEP-1 core measure due to severe sepsis or septic shock?      See more data below for the lab and radiology tests and orders.      3)  Treatment and Disposition    Patient assessment: The patient is a 33 male with history of schizophrenia and bipolar disorder presented to the emergency department less than

## 2024-09-27 NOTE — ED NOTES
Reed notified of high HR. Px states he did take cocaine earlier today. Px states he is ok with EKG but would like to be able to get his medications and leave.  notified

## 2024-09-27 NOTE — ED PROVIDER NOTES
[facility] ED  Emergency Department Encounter  Emergency Medicine Resident     Pt Name:Jose Kaplan  MRN: [id number]  Birthdate 1990  Date of evaluation: 9/27/24  PCP:  No primary care provider on file.  Note Started: 4:37 AM EDT      CHIEF COMPLAINT       No chief complaint on file.      HISTORY OF PRESENT ILLNESS  (Location/Symptom, Timing/Onset, Context/Setting, Quality, Duration, Modifying Factors, Severity.)      Jose Kaplan is a 33 y.o. male who presents to the ER for prescription of his Xanax.  Patient reported that he just wanted to know if this something that can be refilled in the ER.  Patient reported that he will go to [facility] in morning to have his prescription refilled.    PAST MEDICAL / SURGICAL / SOCIAL / FAMILY HISTORY      has a past medical history of Anxiety, Arthritis, Asthma, Bipolar I disorder, most recent episode (or current) depressed, unspecified, Clostridium difficile infection, COPD (chronic obstructive pulmonary disease) (HCC), Depression, Disease of blood and blood forming organ, Eczema, Fracture, metacarpal, Gastric ulcer, Gastritis, GERD (gastroesophageal reflux disease), GI bleed, H. pylori infection, H/O blood clots, Head injury, Headache, Insomnia, Juvenile rheumatoid arthritis (HCC), Neuromuscular disorder (HCC), PFAPA syndrome (HCC), PUD (peptic ulcer disease), Rheumatoid arthritis (HCC), Rheumatoid arthritis(714.0), Severe recurrent major depression without psychotic features ([facility]), Sleep apnea, Still's disease (HCC), Substance abuse (HCC), Suicidal ideation, Suicide attempt by hanging ([facility]), Tobacco dependence, Ulcerative colitis (HCC), and UTI (urinary tract infection).     has a past surgical history that includes Colonoscopy; bronchoscopy; other surgical history; Upper gastrointestinal endoscopy (2/4/16); pr egd transoral biopsy single/multiple (N/A, 3/20/2017); sigmoidoscopy (N/A, 3/20/2017); Cholecystectomy, laparoscopic (07/14/2017); pr  09/27/2024 04:18:06 AM  Condition at Disposition: Data Unavailable      PATIENT REFERRED TO:  No follow-up provider specified.    DISCHARGE MEDICATIONS:  Discharge Medication List as of 9/27/2024  4:20 AM          Go Moreno MD  Emergency Medicine Resident    (Please note that portions of thisnote were completed with a voice recognition program.  Efforts were made to edit the dictations but occasionally words are mis-transcribed.)

## 2024-09-27 NOTE — ED PROVIDER NOTES
[facility]   Emergency Department  Faculty Attestation       I performed a history and physical examination of the patient and discussed management with the resident. I reviewed the resident’s note and agree with the documented findings including all diagnostic interpretations and plan of care. Any areas of disagreement are noted on the chart. I was personally present for the key portions of any procedures. I have documented in the chart those procedures where I was not present during the key portions. I have reviewed the emergency nurses triage note. I agree with the chief complaint, past medical history, past surgical history, allergies, medications, social and family history as documented unless otherwise noted below.  For Physician Assistant/ Nurse Practitioner cases/documentation I have personally evaluated this patient and have completed at least one if not all key elements of the E/M (history, physical exam, and MDM). Additional findings are as noted.    Patient Name: Jose Kaplan  MRN: [id number]  : 1990  Primary Care Physician: No primary care provider on file.    Date of evaluationa: 2024   Note Started: 4:15 AM EDT    Pertinent Comments     Chief Complaint: No chief complaint on file.       Initial vitals: (If not listed, please see nursing documentation)  ED Triage Vitals   BP Systolic BP Percentile Diastolic BP Percentile Temp Temp src Pulse Resp SpO2   -- -- -- -- -- -- -- --      Height Weight         -- --              HPI/PE/Impression:  This is a 33 y.o. male who presents to the Emergency Department asking for prescription of Xanax.  Patient came in with ambulated back down to the ED and stated that he just wanted know if this is something would refill.  After we told him that this is not something we normally refill, he states it is okay that he will go to his after [facility] in the morning, and did not want officially further evaluation.  No vital signs were  obtained.  No further physical exam was obtained that time.  However he is awake alert appears to be at baseline, ambulating with steady gait.    Medical Decision Making       Critical Care: None     Marina Blanco MD  Attending Emergency Physician        Marina Blanco MD  09/27/24 0412

## 2024-09-27 NOTE — ED PROVIDER NOTES
[facility]  Emergency Medicine Department    Pt Name: Jose Kaplan  MRN: [id number]  Birthdate 1990  Date of evaluation: 9/26/2024  Provider: Viral Lees MD    CHIEF COMPLAINT     Chief Complaint   Patient presents with    Medication Refill     Px arrives requesting refill of anxiety medication and Seroquel        HISTORY OF PRESENT ILLNESS  (Location/Symptom, Timing/Onset, Context/Setting,Quality, Duration, Modifying Factors, Severity.)   Jose Kaplan is a 33 y.o. male who presents to the emergency department requesting refills of his Xanax, Flexeril, and ibuprofen.  He states he normally comes to the ER or goes to [facility] for medication refills.  He states he needs the Xanax to help him sleep.    Nursing Notes were reviewed.    ALLERGIES     Dicyclomine, Famotidine, Geodon [ziprasidone hcl], Haloperidol, Iv dye [iodides], Reglan [metoclopramide], Ibuprofen, Aspirin, Dicyclomine hcl, Hydroxyzine hcl, Iodine, Metronidazole, Mirtazapine, Ondansetron hcl, Promethazine, and Ziprasidone    CURRENT MEDICATIONS       Discharge Medication List as of 9/27/2024 12:55 AM        CONTINUE these medications which have NOT CHANGED    Details   !! ibuprofen (ADVIL;MOTRIN) 800 MG tablet Take 1 tablet by mouth every 8 hours as needed for Pain, Disp-20 tablet, R-0Normal      QUEtiapine (SEROQUEL) 100 MG tablet Take 2 tablets by mouth at bedtime for 3 days, Disp-6 tablet, R-0Normal      QUEtiapine (SEROQUEL) 100 MG tablet Take 2 tablets by mouth nightly for 7 days, Disp-14 tablet, R-0Normal      QUEtiapine (SEROQUEL) 100 MG tablet Take 2 tablets by mouth nightly for 3 days, Disp-6 tablet, R-0Normal      !! ibuprofen (ADVIL;MOTRIN) 600 MG tablet Take 1 tablet by mouth every 6 hours as needed for Pain, Disp-30 tablet, R-0Normal      tiZANidine (ZANAFLEX) 2 MG capsule Take 1 capsule by mouth 3 times daily, Disp-20 capsule, R-0Normal      diphenhydrAMINE (BENADRYL) 25 MG capsule Take 1 capsule by mouth  absence of a cardiologist.    Interpretation: Sinus tachycardia with a rate of 150.  Normal Axis.  Normal intervals.  No ST elevations.    RADIOLOGY:   Non-plain film images such as CT, Ultrasound and MRI are read by theradiologist. Plain radiographic images are visualized and preliminarily interpreted by the emergency physician with the below findings:    None indicated      ED BEDSIDE ULTRASOUND:   Performed by ED Physician - none    LABS:  Labs Reviewed - No data to display    All other labs were within normal range or not returned as of this dictation.    EMERGENCYDEPARTMENT COURSE and DIFFERENTIAL DIAGNOSIS/MDM:   Vitals:    Vitals:    09/26/24 2337 09/26/24 2341 09/27/24 0100   BP:  (!) 162/106    Pulse: (!) 160  (!) 145   Resp: 19     Temp: 98.3 °F (36.8 °C)     TempSrc: Oral     SpO2: 96%     Weight: 81.6 kg (180 lb)       32-year-old male with a history of substance abuse and psychiatric illness presenting requesting refills of his Xanax, Flexeril, and ibuprofen.  Review of his prior records reveals that he has frequent presentations to this ER for various complaints including hand and foot pain as well as with request for medication refills.  The patient was seen earlier this evening at an outside hospital ER with the same requests and was told these medications cannot be refilled in the ER.  I reiterated this message to the patient.  I agreed to give the patient 1 dose of Xanax and will recommend [facility] health provider for further medication refills.     Data Reviewed    Decision Rules/Scores utilized:  N/A    External Documents Reviewed: I have independently reviewed patient's previous medical records including labs, notes and imaging.     Tests considered but not ordered and why: No bloodwork indicated at this time     Imaging that is independently reviewed and interpreted by me as: No imaging indicated     See more data below for the lab and radiology tests and orders.

## 2024-09-29 ENCOUNTER — HOSPITAL ENCOUNTER (EMERGENCY)
Age: 34
Discharge: HOME | End: 2024-09-29
Attending: STUDENT IN AN ORGANIZED HEALTH CARE EDUCATION/TRAINING PROGRAM
Payer: COMMERCIAL

## 2024-09-29 DIAGNOSIS — Z13.9 ENCOUNTER FOR MEDICAL SCREENING EXAMINATION: Primary | ICD-10-CM

## 2024-09-29 PROCEDURE — 99283 EMERGENCY DEPT VISIT LOW MDM: CPT

## 2024-09-29 PROCEDURE — 6370000000 HC RX 637 (ALT 250 FOR IP): Performed by: STUDENT IN AN ORGANIZED HEALTH CARE EDUCATION/TRAINING PROGRAM

## 2024-09-29 RX ORDER — CYCLOBENZAPRINE HCL 5 MG
5 TABLET ORAL ONCE
Status: COMPLETED | OUTPATIENT
Start: 2024-09-29 | End: 2024-09-29

## 2024-09-29 RX ADMIN — CYCLOBENZAPRINE HYDROCHLORIDE 5 MG: 5 TABLET, FILM COATED ORAL at 02:45

## 2024-09-29 ASSESSMENT — PAIN SCALES - GENERAL: PAINLEVEL_OUTOF10: 5

## 2024-09-29 NOTE — ED PROVIDER NOTES
[facility] EMERGENCY DEPARTMENT ENCOUNTER      Pt Name: Jose Kaplan  MRN: [id number]  Birthdate 1990  Date of evaluation: 9/29/24    CHIEF COMPLAINT       Chief Complaint   Patient presents with    Hand Pain    Toe Pain       HISTORY OF PRESENT ILLNESS   Jose Kaplan is a 33 y.o. male who presents with hand pain  Well-known to the emergency department.  Multiple ER visits.    PASTMEDICAL HISTORY     Past Medical History:   Diagnosis Date    Anxiety     Arthritis     Asthma     Bipolar I disorder, most recent episode (or current) depressed, unspecified 9/12/2014    Clostridium difficile infection     COPD (chronic obstructive pulmonary disease) ([facility])     Depression     Disease of blood and blood forming organ     Eczema     Fracture, metacarpal     R 4th and 5th    Gastric ulcer     Gastritis 06/13/2018    GERD (gastroesophageal reflux disease)     GI bleed     H. 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[facility])     Neuromuscular disorder ([facility])     PFAPA syndrome ([facility])     PUD (peptic ulcer disease)     Rheumatoid arthritis ([facility])     Rheumatoid arthritis(714.0)     Severe recurrent major depression without psychotic features ([facility]) 11/21/2021    Sleep apnea     Still's disease ([facility])     Substance abuse ([facility])     Hx of opiates, benzos, cocaine, alcohol abuse    Suicidal ideation     Suicide attempt by hanging ([facility])     Tobacco dependence     Ulcerative colitis ([facility])     UTI (urinary tract infection)      Past Problem List  Patient Active Problem List   Diagnosis Code    Opioid abuse ([facility]) F11.10    Noncompliance Z91.199    Rectal bleeding K62.5    Smoker F17.200    Juvenile rheumatoid arthritis ([facility]) M08.00    SRIRAM (obstructive sleep apnea) G47.33    Primary insomnia F51.01    Calculus of bile duct with acute on chronic cholecystitis K80.46    Mild intermittent asthma without complication J45.20    Gastritis K29.70    Generalized abdominal

## 2024-10-03 ENCOUNTER — HOSPITAL ENCOUNTER (EMERGENCY)
Age: 34
Discharge: HOME | End: 2024-10-03
Attending: EMERGENCY MEDICINE
Payer: COMMERCIAL

## 2024-10-03 VITALS
OXYGEN SATURATION: 96 % | TEMPERATURE: 98.5 F | HEART RATE: 125 BPM | RESPIRATION RATE: 18 BRPM | SYSTOLIC BLOOD PRESSURE: 103 MMHG | DIASTOLIC BLOOD PRESSURE: 60 MMHG

## 2024-10-03 DIAGNOSIS — Z76.0 ENCOUNTER FOR MEDICATION REFILL: Primary | ICD-10-CM

## 2024-10-03 DIAGNOSIS — F25.9 SCHIZOAFFECTIVE DISORDER, UNSPECIFIED TYPE (HCC): ICD-10-CM

## 2024-10-03 PROCEDURE — 99283 EMERGENCY DEPT VISIT LOW MDM: CPT

## 2024-10-03 RX ORDER — CYCLOBENZAPRINE HCL 10 MG
10 TABLET ORAL 3 TIMES DAILY PRN
Qty: 10 TABLET | Refills: 0 | Status: SHIPPED | OUTPATIENT
Start: 2024-10-03

## 2024-10-03 RX ORDER — ALPRAZOLAM 0.5 MG
0.5 TABLET ORAL 2 TIMES DAILY PRN
Qty: 6 TABLET | Refills: 0 | Status: SHIPPED | OUTPATIENT
Start: 2024-10-03 | End: 2024-10-06

## 2024-10-03 ASSESSMENT — ENCOUNTER SYMPTOMS
SHORTNESS OF BREATH: 0
COLOR CHANGE: 0

## 2024-10-03 ASSESSMENT — PAIN - FUNCTIONAL ASSESSMENT: PAIN_FUNCTIONAL_ASSESSMENT: NONE - DENIES PAIN

## 2024-10-03 NOTE — ED PROVIDER NOTES
eMERGENCY dEPARTMENT eNCOUnter   Independent Attestation     Pt Name: Jose Kaplan  MRN: [id number]  Birthdate 1990  Date of evaluation: 10/3/24     Jose Kaplan is a 33 y.o. male with CC: Medication Refill (Flexeril 10mg/Alprazolam 0.5mg)        This visit was performed by both a physician and an APC. I performed all aspects of the MDM as documented.      Chrissie Estes MD  Attending Emergency Physician            Chrissie Estes MD  10/03/24 0903

## 2024-10-03 NOTE — ED PROVIDER NOTES
[facility] ED  eMERGENCY dEPARTMENT eNCOUnter      Pt Name: Jose Kaplan  MRN: [id number]  Birthdate 1990  Date of evaluation: 10/3/2024  Provider: MASSIEL Salas CNP    CHIEF COMPLAINT       Chief Complaint   Patient presents with    Medication Refill     Flexeril 10mg/Alprazolam 0.5mg         HISTORY OF PRESENT ILLNESS  (Location/Symptom, Timing/Onset, Context/Setting, Quality, Duration, Modifying Factors, Severity.)   Jose Kaplan is a 33 y.o. male who presents to the emergency department. He is requesting refills of his flexeril and alprazolam. Denies fever, chills, dizziness, CP, SOB. Denies suicidal and homicidal ideations. Denies pain. States he is going to go to the [facility] today.      Nursing Notes were reviewed.    ALLERGIES     Dicyclomine, Famotidine, Geodon [ziprasidone hcl], Haloperidol, Iv dye [iodides], Reglan [metoclopramide], Ibuprofen, Aspirin, Dicyclomine hcl, Hydroxyzine hcl, Iodine, Metronidazole, Mirtazapine, Ondansetron hcl, Promethazine, and Ziprasidone    CURRENT MEDICATIONS       Previous Medications    ACETAMINOPHEN (TYLENOL) 500 MG TABLET    Take 2 tablets by mouth 3 times daily as needed for Pain    CLOTRIMAZOLE-BETAMETHASONE (LOTRISONE) 1-0.05 % CREAM    Apply topically 2 times daily to feet.    CYCLOBENZAPRINE (FLEXERIL) 10 MG TABLET    Take 1 tablet by mouth nightly as needed for Muscle spasms    DIPHENHYDRAMINE (BENADRYL) 25 MG CAPSULE    Take 1 capsule by mouth nightly as needed for Itching or Sleep    IBUPROFEN (ADVIL;MOTRIN) 600 MG TABLET    Take 1 tablet by mouth every 6 hours as needed for Pain    IBUPROFEN (ADVIL;MOTRIN) 800 MG TABLET    Take 1 tablet by mouth every 8 hours as needed for Pain    METFORMIN (GLUCOPHAGE) 500 MG TABLET    Take 1 tablet by mouth 2 times daily (with meals) Indications: last dispensed 1/6/23    NALOXONE 4 MG/0.1ML LIQD NASAL SPRAY        ONDANSETRON (ZOFRAN) 4 MG TABLET    Take 1 tablet by mouth every 8 hours as

## 2024-10-15 ENCOUNTER — HOSPITAL ENCOUNTER (EMERGENCY)
Age: 34
Discharge: HOME | End: 2024-10-15
Attending: EMERGENCY MEDICINE
Payer: COMMERCIAL

## 2024-10-15 VITALS
DIASTOLIC BLOOD PRESSURE: 94 MMHG | WEIGHT: 180 LBS | HEART RATE: 115 BPM | OXYGEN SATURATION: 95 % | SYSTOLIC BLOOD PRESSURE: 145 MMHG | TEMPERATURE: 98.3 F | BODY MASS INDEX: 27.37 KG/M2 | RESPIRATION RATE: 14 BRPM

## 2024-10-15 DIAGNOSIS — Z76.0 ENCOUNTER FOR MEDICATION REFILL: Primary | ICD-10-CM

## 2024-10-15 PROCEDURE — 99283 EMERGENCY DEPT VISIT LOW MDM: CPT

## 2024-10-15 RX ORDER — CYCLOBENZAPRINE HCL 10 MG
10 TABLET ORAL 3 TIMES DAILY PRN
Qty: 21 TABLET | Refills: 0 | Status: SHIPPED | OUTPATIENT
Start: 2024-10-15 | End: 2024-10-25

## 2024-10-15 ASSESSMENT — PAIN - FUNCTIONAL ASSESSMENT: PAIN_FUNCTIONAL_ASSESSMENT: NONE - DENIES PAIN

## 2024-10-15 NOTE — ED NOTES
Patient presents wanting his hand and feet assessed, nothing noted wrong with them, and some medication refills.

## 2024-10-15 NOTE — ED NOTES
Patient advised to report to registration desk in waiting area after he gets his Rx at our outpatient pharmacy to let registration know he is ready to have his cab called.

## 2024-10-15 NOTE — ED PROVIDER NOTES
EMERGENCY DEPARTMENT ENCOUNTER    Pt Name: Jose Kaplan  MRN: [id number]  Birthdate 1990  Date of evaluation: 10/15/24  CHIEF COMPLAINT       Chief Complaint   Patient presents with    Hand Problem     Wants hands and feet checked, would like a cab too    Medication Refill     Xanax 0.5mg, cyclobenzaprine 10mg, abilify 10mg.     HISTORY OF PRESENT ILLNESS   This is a 33-year-old male that presents emergency department with complaints of requesting a medication refill and wished that he could get a cab to the other side of [facility].           REVIEW OF SYSTEMS     Review of Systems  PASTMEDICAL HISTORY     Past Medical History:   Diagnosis Date    Anxiety     Arthritis     Asthma     Bipolar I disorder, most recent episode (or current) depressed, unspecified 9/12/2014    Clostridium difficile infection     COPD (chronic obstructive pulmonary disease) ([facility])     Depression     Disease of blood and blood forming organ     Eczema     Fracture, metacarpal     R 4th and 5th    Gastric ulcer     Gastritis 06/13/2018    GERD (gastroesophageal reflux disease)     GI bleed     H. 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[facility])     Substance abuse ([facility])     Hx of opiates, benzos, cocaine, alcohol abuse    Suicidal ideation     Suicide attempt by hanging ([facility])     Tobacco dependence     Ulcerative colitis ([facility])     UTI (urinary tract infection)      Past Problem List  Patient Active Problem List   Diagnosis Code    Opioid abuse ([facility]) F11.10    Noncompliance Z91.199    Rectal bleeding K62.5    Smoker F17.200    Juvenile rheumatoid arthritis ([facility]) M08.00    SRIRAM (obstructive sleep apnea)  Nausea or Vomiting, Disp-21 tablet, R-0Normal      ondansetron (ZOFRAN) 4 MG tablet Take 1 tablet by mouth every 8 hours as needed for Nausea or Vomiting, Disp-12 tablet, R-0Print      metFORMIN (GLUCOPHAGE) 500 MG tablet Take 1 tablet by mouth 2 times daily (with meals) Indications: last dispensed 1/6/23, Disp-60 tablet, R-0Print      naloxone 4 MG/0.1ML LIQD nasal spray Historical Med       !! - Potential duplicate medications found. Please discuss with provider.        ALLERGIES     is allergic to dicyclomine, famotidine, geodon [ziprasidone hcl], haloperidol, iv dye [iodides], reglan [metoclopramide], ibuprofen, aspirin, dicyclomine hcl, hydroxyzine hcl, iodine, metronidazole, mirtazapine, ondansetron hcl, promethazine, and ziprasidone.  FAMILY HISTORY     He indicated that his mother is alive. He indicated that his father is alive. He indicated that the status of his sister is unknown. He indicated that the status of his other is unknown. He indicated that the status of his paternal cousin is unknown.     SOCIAL HISTORY       Social History     Tobacco Use    Smoking status: Every Day     Current packs/day: 0.50     Average packs/day: 0.5 packs/day for 15.0 years (7.5 ttl pk-yrs)     Types: Cigarettes    Smokeless tobacco: Never   Vaping Use    Vaping status: Every Day    Substances: Nicotine   Substance Use Topics    Alcohol use: Not Currently     Comment: drinks daily    Drug use: Not Currently     Types: Cocaine     Comment: Hx of opiates, benzos, cocaine, alcohol abuse     PHYSICAL EXAM     INITIAL VITALS: BP (!) 145/94   Pulse (!) 115   Temp 98.3 °F (36.8 °C) (Oral)   Resp 14   Wt 81.6 kg (180 lb)   SpO2 95%   BMI 27.37 kg/m²    Physical Exam  Constitutional:       Appearance: Normal appearance.   HENT:      Head: Normocephalic and atraumatic.   Eyes:      Extraocular Movements: Extraocular movements intact.      Pupils: Pupils are equal, round, and reactive to light.   Cardiovascular:      Rate and

## 2024-10-27 ENCOUNTER — HOSPITAL ENCOUNTER (EMERGENCY)
Age: 34
Discharge: LEFT BEFORE DISCHARGE | End: 2024-10-27
Attending: EMERGENCY MEDICINE

## 2024-10-27 DIAGNOSIS — Z76.0 ENCOUNTER FOR MEDICATION REFILL: Primary | ICD-10-CM

## 2024-10-27 PROCEDURE — 4500000002 HC ER NO CHARGE: Performed by: EMERGENCY MEDICINE

## 2024-10-28 ENCOUNTER — HOSPITAL ENCOUNTER (EMERGENCY)
Age: 34
Discharge: HOME | End: 2024-10-28
Attending: EMERGENCY MEDICINE
Payer: COMMERCIAL

## 2024-10-28 VITALS
BODY MASS INDEX: 27.37 KG/M2 | DIASTOLIC BLOOD PRESSURE: 96 MMHG | WEIGHT: 180 LBS | TEMPERATURE: 98 F | RESPIRATION RATE: 15 BRPM | SYSTOLIC BLOOD PRESSURE: 148 MMHG | HEART RATE: 108 BPM | OXYGEN SATURATION: 99 %

## 2024-10-28 DIAGNOSIS — Z76.5 MALINGERING: Primary | ICD-10-CM

## 2024-10-28 PROCEDURE — 6370000000 HC RX 637 (ALT 250 FOR IP): Performed by: EMERGENCY MEDICINE

## 2024-10-28 PROCEDURE — 99283 EMERGENCY DEPT VISIT LOW MDM: CPT

## 2024-10-28 RX ORDER — ACETAMINOPHEN 500 MG
1000 TABLET ORAL 3 TIMES DAILY PRN
Qty: 30 TABLET | Refills: 0 | Status: SHIPPED | OUTPATIENT
Start: 2024-10-28 | End: 2024-11-27

## 2024-10-28 RX ORDER — ALPRAZOLAM 0.25 MG/1
0.25 TABLET ORAL ONCE
Status: COMPLETED | OUTPATIENT
Start: 2024-10-28 | End: 2024-10-28

## 2024-10-28 RX ORDER — CYCLOBENZAPRINE HCL 10 MG
10 TABLET ORAL NIGHTLY PRN
Qty: 3 TABLET | Refills: 0 | Status: SHIPPED | OUTPATIENT
Start: 2024-10-28

## 2024-10-28 RX ADMIN — ALPRAZOLAM 0.25 MG: 0.25 TABLET ORAL at 01:48

## 2024-10-28 NOTE — ED PROVIDER NOTES
[facility] ED  Emergency Department Encounter  Emergency Medicine Resident     Pt Name:Jose Kaplan  MRN: [id number]  Birthdate 1990  Date of evaluation: 10/27/24  PCP:  No primary care provider on file.  Note Started: 11:36 PM EDT      CHIEF COMPLAINT       No chief complaint on file.      HISTORY OF PRESENT ILLNESS  (Location/Symptom, Timing/Onset, Context/Setting, Quality, Duration, Modifying Factors, Severity.)      Jose Kaplan is a 34 y.o. male who presents with request for medication refill patient asking for 0.5 mg of Xanax refilled.  He states he takes this for anxiety.  States that he usually gets it prescribed at [facility] from the ER.  He has no medical complaints at this time    PAST MEDICAL / SURGICAL / SOCIAL / FAMILY HISTORY      has a past medical history of Anxiety, Arthritis, Asthma, Bipolar I disorder, most recent episode (or current) depressed, unspecified, Clostridium difficile infection, COPD (chronic obstructive pulmonary disease) (HCC), Depression, Disease of blood and blood forming organ, Eczema, Fracture, metacarpal, Gastric ulcer, Gastritis, GERD (gastroesophageal reflux disease), GI bleed, H. 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       has a past surgical history that includes Colonoscopy; bronchoscopy; other surgical history; Upper gastrointestinal endoscopy (2/4/16); pr egd transoral biopsy single/multiple (N/A, 3/20/2017); sigmoidoscopy (N/A, 3/20/2017); Cholecystectomy, laparoscopic

## 2024-10-28 NOTE — ED NOTES
Dr. Guerrero at bedside to evaluate pt. Writer overhears pt asking for refill of xanax. Dr. Guerrero replies that pt will need to get his prescription xanax refilled by provider whom wrote original prescription. Pt then turns and leaves ED before writer can obtain vital signs, triage or assessment.

## 2024-10-30 ENCOUNTER — HOSPITAL ENCOUNTER (EMERGENCY)
Age: 34
Discharge: HOME | End: 2024-10-30
Attending: EMERGENCY MEDICINE
Payer: COMMERCIAL

## 2024-10-30 VITALS
HEART RATE: 132 BPM | WEIGHT: 185 LBS | OXYGEN SATURATION: 98 % | HEIGHT: 68 IN | RESPIRATION RATE: 16 BRPM | SYSTOLIC BLOOD PRESSURE: 156 MMHG | BODY MASS INDEX: 28.04 KG/M2 | DIASTOLIC BLOOD PRESSURE: 98 MMHG | TEMPERATURE: 98.4 F

## 2024-10-30 DIAGNOSIS — Z76.0 ENCOUNTER FOR MEDICATION REFILL: Primary | ICD-10-CM

## 2024-10-30 PROCEDURE — 99282 EMERGENCY DEPT VISIT SF MDM: CPT

## 2024-10-30 ASSESSMENT — ENCOUNTER SYMPTOMS
NAUSEA: 0
COLOR CHANGE: 0
VOMITING: 0
COUGH: 0
ABDOMINAL PAIN: 0
DIARRHEA: 0
TROUBLE SWALLOWING: 0
PHOTOPHOBIA: 0
SHORTNESS OF BREATH: 0

## 2024-10-30 ASSESSMENT — PAIN - FUNCTIONAL ASSESSMENT: PAIN_FUNCTIONAL_ASSESSMENT: NONE - DENIES PAIN

## 2024-10-31 ENCOUNTER — HOSPITAL ENCOUNTER (EMERGENCY)
Age: 34
Discharge: HOME | End: 2024-10-31
Attending: STUDENT IN AN ORGANIZED HEALTH CARE EDUCATION/TRAINING PROGRAM
Payer: COMMERCIAL

## 2024-10-31 VITALS — TEMPERATURE: 98.1 F | OXYGEN SATURATION: 97 % | HEART RATE: 104 BPM | RESPIRATION RATE: 14 BRPM

## 2024-10-31 DIAGNOSIS — Z76.5 MALINGERING: Primary | ICD-10-CM

## 2024-10-31 PROCEDURE — 99282 EMERGENCY DEPT VISIT SF MDM: CPT

## 2024-10-31 ASSESSMENT — PAIN SCALES - GENERAL: PAINLEVEL_OUTOF10: 3

## 2024-10-31 ASSESSMENT — PAIN - FUNCTIONAL ASSESSMENT: PAIN_FUNCTIONAL_ASSESSMENT: 0-10

## 2024-10-31 ASSESSMENT — PAIN DESCRIPTION - ORIENTATION: ORIENTATION: RIGHT

## 2024-10-31 ASSESSMENT — PAIN DESCRIPTION - LOCATION: LOCATION: FOOT

## 2024-10-31 ASSESSMENT — PAIN DESCRIPTION - PAIN TYPE: TYPE: ACUTE PAIN

## 2024-10-31 NOTE — ED NOTES
Writer at bedside w/ Juliano Flores DO. Pt states that he is wanting flexeril for his hands. Pt was seen at two additional Er's and was told to go see a PCP. Juliano Flores DO performed an assessment. Pt cooperative at this time  Pt will be given a providers list.

## 2024-10-31 NOTE — ED NOTES
Pt arrived to ed with c/o right foot pain. Pt denies any new injury or trauma. Pt ambulated to room 3 without complication. Pt frequents the ED for benign complaints/ malingering and was seen earlier in our facility. Respirations non labored. Pt A&Ox3. Pt resting without complications.

## 2024-10-31 NOTE — ED NOTES
Addended by: FELISHA CULP on: 3/4/2021 02:44 PM     Modules accepted: Orders     No answer when called to be roomed.

## 2024-10-31 NOTE — ED PROVIDER NOTES
tablet by mouth 2 times daily (with meals) Indications: last dispensed 1/6/23    NALOXONE 4 MG/0.1ML LIQD NASAL SPRAY        ONDANSETRON (ZOFRAN) 4 MG TABLET    Take 1 tablet by mouth every 8 hours as needed for Nausea or Vomiting    ONDANSETRON (ZOFRAN-ODT) 4 MG DISINTEGRATING TABLET    Take 1 tablet by mouth 3 times daily as needed for Nausea or Vomiting    QUETIAPINE (SEROQUEL) 100 MG TABLET    Take 2 tablets by mouth nightly for 3 days    QUETIAPINE (SEROQUEL) 100 MG TABLET    Take 2 tablets by mouth nightly for 7 days    QUETIAPINE (SEROQUEL) 100 MG TABLET    Take 2 tablets by mouth at bedtime for 3 days    SERTRALINE (ZOLOFT) 25 MG TABLET    Take 1 tablet by mouth daily    TIZANIDINE (ZANAFLEX) 2 MG CAPSULE    Take 1 capsule by mouth 3 times daily    TRAZODONE (DESYREL) 50 MG TABLET    Take 1 tablet by mouth nightly as needed for Sleep     ALLERGIES     is allergic to dicyclomine, famotidine, geodon [ziprasidone hcl], haloperidol, iv dye [iodides], reglan [metoclopramide], ibuprofen, aspirin, dicyclomine hcl, hydroxyzine hcl, iodine, metronidazole, mirtazapine, ondansetron hcl, promethazine, and ziprasidone.  FAMILY HISTORY     He indicated that his mother is alive. He indicated that his father is alive. He indicated that the status of his sister is unknown. He indicated that the status of his other is unknown. He indicated that the status of his paternal cousin is unknown.     SOCIAL HISTORY       Social History     Tobacco Use    Smoking status: Every Day     Current packs/day: 0.50     Average packs/day: 0.5 packs/day for 15.0 years (7.5 ttl pk-yrs)     Types: Cigarettes    Smokeless tobacco: Never   Vaping Use    Vaping status: Every Day    Substances: Nicotine   Substance Use Topics    Alcohol use: Not Currently     Comment: drinks daily    Drug use: Not Currently     Types: Cocaine     Comment: Hx of opiates, benzos, cocaine, alcohol abuse     PHYSICAL EXAM     INITIAL VITALS: BP (!) 156/98   Pulse (!)  can have these medications managed long-term.  I told him that he already has a prescription that is active at a pharmacy for him.  The patient asked for water.  Patient was provided with water.  Patient was instructed to follow-up with the primary and to return to the ER immediately if symptoms worsen or change.  Patient understands the plan.      CRITICAL CARE:       PROCEDURES:    Procedures      DATA FOR LAB AND RADIOLOGY TESTS ORDERED BELOW ARE REVIEWED BY THE ED CLINICIAN:    RADIOLOGY: All x-rays, CT, MRI, and formal ultrasound images (except ED bedside ultrasound) are read by the radiologist, see reports below, unless otherwise noted in MDM or here.  Reports below are reviewed by myself.  No orders to display       LABS: Lab orders shown below, the results are reviewed by myself, and all abnormals are listed below.  Labs Reviewed - No data to display    Vitals Reviewed:    Vitals:    10/30/24 2013 10/30/24 2048   BP: (!) 156/98    Pulse: (!) 140 (!) 132   Resp: 16    Temp: 98.4 °F (36.9 °C)    SpO2: 98%    Weight: 83.9 kg (185 lb)    Height: 1.727 m (5' 8\")      MEDICATIONS GIVEN TO PATIENT THIS ENCOUNTER:  No orders of the defined types were placed in this encounter.    DISCHARGE PRESCRIPTIONS:  New Prescriptions    No medications on file     PHYSICIAN CONSULTS ORDERED THIS ENCOUNTER:  None  FINAL IMPRESSION      1. Encounter for medication refill          DISPOSITION/PLAN   DISPOSITION Decision To Discharge 10/30/2024 08:48:23 PM           OUTPATIENT FOLLOW UP THE PATIENT:  PCP  [id number]  Schedule an appointment as soon as possible for a visit in 2 days      [facility] ED  [facility] [address]  [phone]  Go to   If symptoms worsen, As needed      DO Sandra Rg Sami, DO  10/30/24 2052

## 2024-11-05 NOTE — ED PROVIDER NOTES
[facility] EMERGENCY DEPARTMENT ENCOUNTER      Pt Name: Jose Kaplan  MRN: [id number]  Birthdate 1990  Date of evaluation: 11/4/24    CHIEF COMPLAINT       Chief Complaint   Patient presents with    Foot Pain     Right foot pain/ pt denies new injury or trauma/ pt ambulated to room 3 without complication       HISTORY OF PRESENT ILLNESS   Jose Kaplan is a 34 y.o. male who presents with medical evaluation  Well-known to the emergency department.  Multiple ER visits.    PASTMEDICAL HISTORY     Past Medical History:   Diagnosis Date    Anxiety     Arthritis     Asthma     Bipolar I disorder, most recent episode (or current) depressed, unspecified 9/12/2014    Clostridium difficile infection     COPD (chronic obstructive pulmonary disease) ([facility])     Depression     Disease of blood and blood forming organ     Eczema     Fracture, metacarpal     R 4th and 5th    Gastric ulcer     Gastritis 06/13/2018    GERD (gastroesophageal reflux disease)     GI bleed     H. 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[facility])     Substance abuse ([facility])     Hx of opiates, benzos, cocaine, alcohol abuse    Suicidal ideation     Suicide attempt by hanging ([facility])     Tobacco dependence     Ulcerative colitis ([facility])     UTI (urinary tract infection)      Past Problem List  Patient Active Problem List   Diagnosis Code    Opioid abuse (HCC) F11.10    Noncompliance Z91.199    Rectal bleeding K62.5    Smoker F17.200    Juvenile rheumatoid arthritis (HCC) M08.00    SRIRAM (obstructive sleep apnea) G47.33    Primary insomnia F51.01    Calculus of bile duct with acute on chronic cholecystitis K80.46    Mild

## 2024-11-08 ENCOUNTER — HOSPITAL ENCOUNTER (EMERGENCY)
Age: 34
Discharge: HOME | End: 2024-11-08
Payer: COMMERCIAL

## 2024-11-08 VITALS — OXYGEN SATURATION: 100 % | RESPIRATION RATE: 16 BRPM | HEART RATE: 100 BPM | TEMPERATURE: 98.3 F

## 2024-11-08 DIAGNOSIS — Z76.0 ENCOUNTER FOR MEDICATION REFILL: Primary | ICD-10-CM

## 2024-11-08 PROCEDURE — 6370000000 HC RX 637 (ALT 250 FOR IP)

## 2024-11-08 PROCEDURE — 99283 EMERGENCY DEPT VISIT LOW MDM: CPT

## 2024-11-08 RX ORDER — CYCLOBENZAPRINE HCL 10 MG
10 TABLET ORAL ONCE
Status: COMPLETED | OUTPATIENT
Start: 2024-11-08 | End: 2024-11-08

## 2024-11-08 RX ORDER — IBUPROFEN 600 MG/1
600 TABLET, FILM COATED ORAL ONCE
Status: COMPLETED | OUTPATIENT
Start: 2024-11-08 | End: 2024-11-08

## 2024-11-08 RX ADMIN — CYCLOBENZAPRINE 10 MG: 10 TABLET, FILM COATED ORAL at 21:34

## 2024-11-08 RX ADMIN — IBUPROFEN 600 MG: 600 TABLET ORAL at 21:34

## 2024-11-08 ASSESSMENT — ENCOUNTER SYMPTOMS
COUGH: 0
NAUSEA: 0
VOMITING: 0
SHORTNESS OF BREATH: 0
CHEST TIGHTNESS: 0
ABDOMINAL PAIN: 0

## 2024-11-08 ASSESSMENT — PAIN - FUNCTIONAL ASSESSMENT: PAIN_FUNCTIONAL_ASSESSMENT: NONE - DENIES PAIN

## 2024-11-09 NOTE — DISCHARGE INSTRUCTIONS
Please  your medications from the pharmacy.    PLEASE RETURN TO THE EMERGENCY DEPARTMENT IMMEDIATELY if your symptoms worsen in anyway or in 8-12 hours if not improved for re-evaluation.  You should immediately return to the ER for symptoms such as increasing pain, bloody stool, fever, a feeling of passing out, light headed, dizziness, chest pain, shortness of breath, persistent nausea and/or vomiting, numbness or weakness to the arms or legs, coolness or color change of the arms or legs.      Take your medication as indicated and prescribed.  If you are given an antibiotic then, make sure you get the prescription filled and take the antibiotics until finished.      THANK YOU!!!      On behalf of the Emergency Department staff at [facility], I would like to thank you for giving us the opportunity to address your health care needs and concerns.    We hope that during your visit, our service was delivered in a professional and caring manner. Please keep [facility] in mind as we walk with you down the path to your own personal wellness.     Please expect an automated text message or email from us so we can ask a few questions about your health and progress. Based on your answers, a clinician may call you back to offer help and instructions.    Please understand that early in the process of an illness or injury, an emergency department workup can be falsely reassuring.  If you notice any worsening, changing or persistent symptoms please call your family doctor or return to the ER immediately.

## 2024-11-09 NOTE — ED PROVIDER NOTES
rash.   Neurological:      Mental Status: He is alert and oriented to person, place, and time.      Gait: Gait normal.           DIFFERENTIAL DIAGNOSIS / MDM   Patient presents emergency department complaint described above.  Patient speaking in clear complete sentences without difficulty, alert and orient x 4.  Appears well-nourished well-hydrated, nontoxic-appearing.  Concern for malingering, patient very well-known to this department.  Will not give a dose of his benzodiazepine however I will give a dose of his Flexeril.  Explained to the patient I cannot give him an IM injection of Benadryl for no reason and he is okay with this.  He will  his prescriptions from the pharmacy tomorrow as planned.  For all other needs he will follow-up with his PCP.    PLAN (LABS / IMAGING / EKG):  No orders of the defined types were placed in this encounter.      MEDICATIONS ORDERED:  Orders Placed This Encounter   Medications    ibuprofen (ADVIL;MOTRIN) tablet 600 mg    cyclobenzaprine (FLEXERIL) tablet 10 mg       DIAGNOSTIC RESULTS     EKG: All EKG's are interpreted by the Emergency Department Physician who either signs or Co-signs this chart in the absenceof a cardiologist.        RADIOLOGY: All images are read by the radiologist and their interpretations are reviewed.    No results found.    LABS:  No results found for this visit on 11/08/24.    EMERGENCY DEPARTMENT COURSE           Vitals:    Vitals:    11/08/24 1924 11/08/24 2158   Pulse: (!) 118 100   Resp: 16    Temp: 98.3 °F (36.8 °C)    SpO2: 100%      -------------------------  BP:  (pt refusing BP), Temp: 98.3 °F (36.8 °C), Pulse: 100, Respirations: 16      RE-EVALUATION:  See ED Course notes above.    DISCHARGE PLANNING:        The patient and/or family and I have discussed the diagnosis and risks, and we agree with discharging home to follow-up with their pertinent providers. The patient appears stable for discharge and has been instructed to return

## 2024-11-09 NOTE — ED NOTES
Pt request his xanax for his anxiety and some flexeril.  Has prescriptions that need picked up tomorrow and is just requesting pills for now.  Pt is cooperative with staff.

## 2024-11-11 ENCOUNTER — HOSPITAL ENCOUNTER (EMERGENCY)
Age: 34
Discharge: HOME | End: 2024-11-11
Payer: COMMERCIAL

## 2024-11-11 VITALS — RESPIRATION RATE: 17 BRPM | OXYGEN SATURATION: 99 % | TEMPERATURE: 98.9 F | HEART RATE: 101 BPM

## 2024-11-11 DIAGNOSIS — Z76.0 ENCOUNTER FOR MEDICATION REFILL: Primary | ICD-10-CM

## 2024-11-11 PROCEDURE — 99283 EMERGENCY DEPT VISIT LOW MDM: CPT

## 2024-11-11 RX ORDER — CYCLOBENZAPRINE HCL 10 MG
10 TABLET ORAL 2 TIMES DAILY PRN
Qty: 6 TABLET | Refills: 0 | Status: SHIPPED | OUTPATIENT
Start: 2024-11-11 | End: 2024-11-12 | Stop reason: ALTCHOICE

## 2024-11-11 ASSESSMENT — PAIN - FUNCTIONAL ASSESSMENT: PAIN_FUNCTIONAL_ASSESSMENT: NONE - DENIES PAIN

## 2024-11-11 NOTE — DISCHARGE INSTRUCTIONS
Please follow-up as soon as possible to [facility] for your alprazolam refill and further continuity of care.    Please return to the ED if you ever feel suicidal, fever/chills, difficulty breathing, chest pain, or any other concern.

## 2024-11-11 NOTE — ED PROVIDER NOTES
[facility] EMERGENCY DEPT      EMERGENCY MEDICINE     Pt Name: Jose Kaplan  MRN: [id number]  Birthdate 1990  Date of evaluation: 11/11/2024  Provider: Antelmo Mckeon PA-C    CHIEF COMPLAINT       Chief Complaint   Patient presents with    Medication Refill     HISTORY OF PRESENT ILLNESS   Jose Kaplan is a pleasant 34 y.o. male who presents to the emergency departmentas a walk in to the ED lobby for evaluation of medication refill.    Patient is a transient that came in here for medication refill of alprazolam, ibuprofen, and Flexeril.  Patient states that his PCP is someone in [facility] but he cannot remember the provider's name.  He states he is taking alprazolam for anxiety/depression, Flexeril 10 mg for arthritis and muscle pain after falling down the steps a few months ago, and he also request ibuprofen 800 mg for future uses.    PASTMEDICAL HISTORY     Past Medical History:   Diagnosis Date    Anxiety     Arthritis     Asthma     Bipolar I disorder, most recent episode (or current) depressed, unspecified 9/12/2014    Clostridium difficile infection     COPD (chronic obstructive pulmonary disease) ([facility])     Depression     Disease of blood and blood forming organ     Eczema     Fracture, metacarpal     R 4th and 5th    Gastric ulcer     Gastritis 06/13/2018    GERD (gastroesophageal reflux disease)     GI bleed     H. 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

## 2024-11-11 NOTE — ED TRIAGE NOTES
Pt presents to the ER for a medications refill. Pt states he was at another hospital a couple days ago and they would not refill it. Pt refusing to sit down or obtain BP during triage. VSS

## 2024-11-12 ENCOUNTER — HOSPITAL ENCOUNTER (EMERGENCY)
Age: 34
Discharge: HOME | End: 2024-11-12
Attending: EMERGENCY MEDICINE
Payer: COMMERCIAL

## 2024-11-12 VITALS
HEART RATE: 104 BPM | TEMPERATURE: 97.7 F | RESPIRATION RATE: 16 BRPM | OXYGEN SATURATION: 97 % | WEIGHT: 180 LBS | BODY MASS INDEX: 27.37 KG/M2 | SYSTOLIC BLOOD PRESSURE: 138 MMHG | DIASTOLIC BLOOD PRESSURE: 93 MMHG

## 2024-11-12 DIAGNOSIS — Z76.0 ENCOUNTER FOR MEDICATION REFILL: Primary | ICD-10-CM

## 2024-11-12 PROCEDURE — 99283 EMERGENCY DEPT VISIT LOW MDM: CPT

## 2024-11-12 RX ORDER — ALPRAZOLAM 0.5 MG
0.5 TABLET ORAL 3 TIMES DAILY PRN
Qty: 6 TABLET | Refills: 0 | Status: SHIPPED | OUTPATIENT
Start: 2024-11-12 | End: 2024-12-12

## 2024-11-12 RX ORDER — CYCLOBENZAPRINE HCL 10 MG
10 TABLET ORAL 3 TIMES DAILY PRN
Qty: 21 TABLET | Refills: 0 | Status: SHIPPED | OUTPATIENT
Start: 2024-11-12 | End: 2024-11-16

## 2024-11-12 ASSESSMENT — PAIN - FUNCTIONAL ASSESSMENT: PAIN_FUNCTIONAL_ASSESSMENT: NONE - DENIES PAIN

## 2024-11-12 NOTE — ED PROVIDER NOTES
can be provided that every mistake has been identified and corrected by editing.        Elroy You MD  11/12/24 0870

## 2024-11-16 ENCOUNTER — HOSPITAL ENCOUNTER (EMERGENCY)
Age: 34
Discharge: HOME | End: 2024-11-16
Attending: EMERGENCY MEDICINE
Payer: COMMERCIAL

## 2024-11-16 VITALS
TEMPERATURE: 98.1 F | RESPIRATION RATE: 16 BRPM | HEART RATE: 110 BPM | OXYGEN SATURATION: 100 % | DIASTOLIC BLOOD PRESSURE: 112 MMHG | SYSTOLIC BLOOD PRESSURE: 154 MMHG

## 2024-11-16 DIAGNOSIS — Z76.0 ENCOUNTER FOR MEDICATION REFILL: Primary | ICD-10-CM

## 2024-11-16 PROCEDURE — 99283 EMERGENCY DEPT VISIT LOW MDM: CPT

## 2024-11-16 RX ORDER — CYCLOBENZAPRINE HCL 10 MG
10 TABLET ORAL 3 TIMES DAILY PRN
Qty: 21 TABLET | Refills: 0 | Status: SHIPPED | OUTPATIENT
Start: 2024-11-16 | End: 2024-11-19

## 2024-11-16 RX ORDER — IBUPROFEN 600 MG/1
600 TABLET, FILM COATED ORAL 3 TIMES DAILY PRN
Qty: 30 TABLET | Refills: 0 | Status: SHIPPED | OUTPATIENT
Start: 2024-11-16 | End: 2024-11-17

## 2024-11-16 ASSESSMENT — ENCOUNTER SYMPTOMS
COLOR CHANGE: 0
ABDOMINAL PAIN: 0
VOMITING: 0
TROUBLE SWALLOWING: 0
COUGH: 0
PHOTOPHOBIA: 0
DIARRHEA: 0
NAUSEA: 0
SHORTNESS OF BREATH: 0

## 2024-11-16 NOTE — ED PROVIDER NOTES
EMERGENCY DEPARTMENT ENCOUNTER    Pt Name: Jose Kaplan  MRN: [id number]  Birthdate 1990  Date of evaluation: 11/16/24  CHIEF COMPLAINT       Chief Complaint   Patient presents with    Medication Refill     Alprazolam, flexiril and ibuprofen      HISTORY OF PRESENT ILLNESS   34-year-old male presenting to the ER complaining of needing a medication refill.  Patient is asking for a Xanax, ibuprofen and Flexeril refill.  The patient states that he still does have Xanax in his possession.  Patient is not admitting to any symptoms.    The history is provided by the patient.   Illness   Pertinent negatives include no fever, no photophobia, no abdominal pain, no diarrhea, no nausea, no vomiting, no congestion, no ear pain, no cough and no rash.           REVIEW OF SYSTEMS     Review of Systems   Constitutional:  Negative for activity change, fatigue and fever.   HENT:  Negative for congestion, ear pain and trouble swallowing.    Eyes:  Negative for photophobia and visual disturbance.   Respiratory:  Negative for cough and shortness of breath.    Cardiovascular:  Negative for chest pain and palpitations.   Gastrointestinal:  Negative for abdominal pain, diarrhea, nausea and vomiting.   Genitourinary:  Negative for dysuria, flank pain and urgency.   Musculoskeletal:  Negative for arthralgias and myalgias.   Skin:  Negative for color change and rash.   Neurological:  Negative for dizziness and facial asymmetry.   Psychiatric/Behavioral:  Negative for agitation and behavioral problems.      PASTMEDICAL HISTORY     Past Medical History:   Diagnosis Date    Anxiety     Arthritis     Asthma     Bipolar I disorder, most recent episode (or current) depressed, unspecified 9/12/2014    Clostridium difficile infection     COPD (chronic obstructive pulmonary disease) ([facility])     Depression     Disease of blood and blood forming organ     Eczema     Fracture, metacarpal     R 4th and 5th    Gastric ulcer     Gastritis 06/13/2018        NALOXONE 4 MG/0.1ML LIQD NASAL SPRAY        ONDANSETRON (ZOFRAN) 4 MG TABLET    Take 1 tablet by mouth every 8 hours as needed for Nausea or Vomiting    ONDANSETRON (ZOFRAN-ODT) 4 MG DISINTEGRATING TABLET    Take 1 tablet by mouth 3 times daily as needed for Nausea or Vomiting    QUETIAPINE (SEROQUEL) 100 MG TABLET    Take 2 tablets by mouth nightly for 3 days    QUETIAPINE (SEROQUEL) 100 MG TABLET    Take 2 tablets by mouth nightly for 7 days    QUETIAPINE (SEROQUEL) 100 MG TABLET    Take 2 tablets by mouth at bedtime for 3 days    SERTRALINE (ZOLOFT) 25 MG TABLET    Take 1 tablet by mouth daily    TIZANIDINE (ZANAFLEX) 2 MG CAPSULE    Take 1 capsule by mouth 3 times daily    TRAZODONE (DESYREL) 50 MG TABLET    Take 1 tablet by mouth nightly as needed for Sleep     ALLERGIES     is allergic to dicyclomine, famotidine, geodon [ziprasidone hcl], haloperidol, iv dye [iodides], reglan [metoclopramide], ibuprofen, aspirin, dicyclomine hcl, hydroxyzine hcl, iodine, metronidazole, mirtazapine, ondansetron hcl, promethazine, and ziprasidone.  FAMILY HISTORY     He indicated that his mother is alive. He indicated that his father is alive. He indicated that the status of his sister is unknown. He indicated that the status of his other is unknown. He indicated that the status of his paternal cousin is unknown.     SOCIAL HISTORY       Social History     Tobacco Use    Smoking status: Every Day     Current packs/day: 0.50     Average packs/day: 0.5 packs/day for 15.0 years (7.5 ttl pk-yrs)     Types: Cigarettes    Smokeless tobacco: Never   Vaping Use    Vaping status: Every Day    Substances: Nicotine   Substance Use Topics    Alcohol use: Not Currently     Comment: drinks daily    Drug use: Not Currently     Types: Cocaine     Comment: Hx of opiates, benzos, cocaine, alcohol abuse     PHYSICAL EXAM     INITIAL VITALS: BP (!) 154/112   Pulse (!) 110   Temp 98.1 °F (36.7 °C)   Resp 16   SpO2 100%    Physical

## 2024-11-17 ENCOUNTER — HOSPITAL ENCOUNTER (EMERGENCY)
Age: 34
Discharge: HOME | End: 2024-11-17
Attending: EMERGENCY MEDICINE
Payer: MEDICARE

## 2024-11-17 ENCOUNTER — HOSPITAL ENCOUNTER (EMERGENCY)
Age: 34
Discharge: HOME | End: 2024-11-17
Attending: STUDENT IN AN ORGANIZED HEALTH CARE EDUCATION/TRAINING PROGRAM
Payer: MEDICARE

## 2024-11-17 VITALS
TEMPERATURE: 97.2 F | DIASTOLIC BLOOD PRESSURE: 80 MMHG | HEART RATE: 79 BPM | SYSTOLIC BLOOD PRESSURE: 145 MMHG | OXYGEN SATURATION: 98 % | RESPIRATION RATE: 17 BRPM

## 2024-11-17 VITALS
HEIGHT: 67 IN | WEIGHT: 180 LBS | HEART RATE: 95 BPM | BODY MASS INDEX: 28.25 KG/M2 | RESPIRATION RATE: 17 BRPM | DIASTOLIC BLOOD PRESSURE: 112 MMHG | OXYGEN SATURATION: 98 % | TEMPERATURE: 98.5 F | SYSTOLIC BLOOD PRESSURE: 151 MMHG

## 2024-11-17 DIAGNOSIS — Z76.0 ENCOUNTER FOR MEDICATION REFILL: Primary | ICD-10-CM

## 2024-11-17 DIAGNOSIS — S90.821A BLISTER OF RIGHT FOOT, INITIAL ENCOUNTER: Primary | ICD-10-CM

## 2024-11-17 PROCEDURE — 99283 EMERGENCY DEPT VISIT LOW MDM: CPT

## 2024-11-17 PROCEDURE — 6370000000 HC RX 637 (ALT 250 FOR IP): Performed by: STUDENT IN AN ORGANIZED HEALTH CARE EDUCATION/TRAINING PROGRAM

## 2024-11-17 PROCEDURE — 99282 EMERGENCY DEPT VISIT SF MDM: CPT

## 2024-11-17 RX ORDER — IBUPROFEN 600 MG/1
600 TABLET, FILM COATED ORAL EVERY 6 HOURS PRN
Qty: 15 TABLET | Refills: 0 | Status: SHIPPED | OUTPATIENT
Start: 2024-11-17

## 2024-11-17 RX ORDER — IBUPROFEN 800 MG/1
800 TABLET, FILM COATED ORAL ONCE
Status: COMPLETED | OUTPATIENT
Start: 2024-11-17 | End: 2024-11-17

## 2024-11-17 RX ORDER — CEPHALEXIN 500 MG/1
500 CAPSULE ORAL 4 TIMES DAILY
Qty: 28 CAPSULE | Refills: 0 | Status: SHIPPED | OUTPATIENT
Start: 2024-11-17 | End: 2024-11-19

## 2024-11-17 RX ORDER — CEPHALEXIN 500 MG/1
500 CAPSULE ORAL ONCE
Status: COMPLETED | OUTPATIENT
Start: 2024-11-17 | End: 2024-11-17

## 2024-11-17 RX ADMIN — CEPHALEXIN 500 MG: 500 CAPSULE ORAL at 02:24

## 2024-11-17 RX ADMIN — IBUPROFEN 800 MG: 800 TABLET ORAL at 02:24

## 2024-11-17 ASSESSMENT — PAIN SCALES - GENERAL: PAINLEVEL_OUTOF10: 0

## 2024-11-17 ASSESSMENT — PAIN - FUNCTIONAL ASSESSMENT: PAIN_FUNCTIONAL_ASSESSMENT: 0-10

## 2024-11-17 NOTE — ED NOTES
Pt arrives to ED with complaint of medication refill. Pt is also reporting that he is concerned about his right foot.   Pt right foot does appear to be slightly red.

## 2024-11-17 NOTE — ED PROVIDER NOTES
EMERGENCY DEPARTMENT ENCOUNTER    Pt Name: Jose Kaplan  MRN: [id number]  Birthdate 1990  Date of evaluation: 11/17/24  CHIEF COMPLAINT       Chief Complaint   Patient presents with    Medication Refill     HISTORY OF PRESENT ILLNESS   34-year-old male presents for medication refill.  Patient requesting refills of his Xanax, Flexeril, and ibuprofen.  Patient has been seen multiple times recently for same complaint, was seen at [facility] yesterday and had refills of his Flexeril and ibuprofen sent to the pharmacy.  Patient denies any other complaints at this time, he denies any suicidal or homicidal ideation denies visual or auditory hallucinations denies recent drug or alcohol use denies any medical complaints.    The history is provided by the patient.           REVIEW OF SYSTEMS     Review of Systems   Constitutional:  Negative for chills and fever.   HENT:  Negative for congestion and ear pain.    Eyes:  Negative for pain.   Respiratory:  Negative for shortness of breath.    Cardiovascular:  Negative for chest pain, palpitations and leg swelling.   Gastrointestinal:  Negative for abdominal pain.   Genitourinary:  Negative for dysuria and flank pain.   Musculoskeletal:  Negative for back pain.   Skin:  Negative for color change.   Neurological:  Negative for numbness and headaches.   Psychiatric/Behavioral:  Negative for confusion.    All other systems reviewed and are negative.    PASTMEDICAL HISTORY     Past Medical History:   Diagnosis Date    Anxiety     Arthritis     Asthma     Bipolar I disorder, most recent episode (or current) depressed, unspecified 9/12/2014    Clostridium difficile infection     COPD (chronic obstructive pulmonary disease) ([facility])     Depression     Disease of blood and blood forming organ     Eczema     Fracture, metacarpal     R 4th and 5th    Gastric ulcer     Gastritis 06/13/2018    GERD (gastroesophageal reflux disease)     GI bleed     H. pylori infection     H/O blood clots

## 2024-11-17 NOTE — ED PROVIDER NOTES
[facility] EMERGENCY DEPARTMENT ENCOUNTER      Pt Name: Jose Kaplan  MRN: [id number]  Birthdate 1990  Date of evaluation: 11/17/24    CHIEF COMPLAINT       Chief Complaint   Patient presents with    Medication Refill     Motrin 600 mg; xanax 0.5mg     Foot Pain     Right foot       HISTORY OF PRESENT ILLNESS   Jose Kaplan is a 34 y.o. male who presents with medication refill.  Well-known to the emergency department.  Multiple ER visits.  Does have some right lateral foot pain and redness.  Homeless.      PASTMEDICAL HISTORY     Past Medical History:   Diagnosis Date    Anxiety     Arthritis     Asthma     Bipolar I disorder, most recent episode (or current) depressed, unspecified 9/12/2014    Clostridium difficile infection     COPD (chronic obstructive pulmonary disease) ([facility])     Depression     Disease of blood and blood forming organ     Eczema     Fracture, metacarpal     R 4th and 5th    Gastric ulcer     Gastritis 06/13/2018    GERD (gastroesophageal reflux disease)     GI bleed     H. 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[facility])     Substance abuse ([facility])     Hx of opiates, benzos, cocaine, alcohol abuse    Suicidal ideation     Suicide attempt by hanging ([facility])     Tobacco dependence     Ulcerative colitis ([facility])     UTI (urinary tract infection)      Past Problem List  Patient Active Problem List   Diagnosis Code    Opioid abuse ([facility]) F11.10    Noncompliance Z91.199    Rectal bleeding K62.5    Smoker F17.200    Juvenile rheumatoid arthritis ([facility]) M08.00    SRIRAM (obstructive sleep apnea) G47.33    Primary insomnia F51.01    Calculus of bile duct with acute on

## 2024-11-19 ENCOUNTER — HOSPITAL ENCOUNTER (EMERGENCY)
Age: 34
Discharge: HOME | End: 2024-11-19
Payer: COMMERCIAL

## 2024-11-19 VITALS — HEART RATE: 102 BPM | OXYGEN SATURATION: 100 % | TEMPERATURE: 98 F

## 2024-11-19 DIAGNOSIS — K08.89 PAIN, DENTAL: Primary | ICD-10-CM

## 2024-11-19 DIAGNOSIS — Z76.0 ENCOUNTER FOR MEDICATION REFILL: ICD-10-CM

## 2024-11-19 PROCEDURE — 6370000000 HC RX 637 (ALT 250 FOR IP): Performed by: NURSE PRACTITIONER

## 2024-11-19 PROCEDURE — 99283 EMERGENCY DEPT VISIT LOW MDM: CPT

## 2024-11-19 RX ORDER — CYCLOBENZAPRINE HCL 5 MG
5 TABLET ORAL 2 TIMES DAILY PRN
Qty: 10 TABLET | Refills: 0 | Status: SHIPPED | OUTPATIENT
Start: 2024-11-19 | End: 2024-11-24

## 2024-11-19 RX ORDER — ACETAMINOPHEN 325 MG/1
650 TABLET ORAL ONCE
Status: COMPLETED | OUTPATIENT
Start: 2024-11-19 | End: 2024-11-19

## 2024-11-19 RX ORDER — CYCLOBENZAPRINE HCL 10 MG
10 TABLET ORAL ONCE
Status: COMPLETED | OUTPATIENT
Start: 2024-11-19 | End: 2024-11-19

## 2024-11-19 RX ADMIN — AMOXICILLIN AND CLAVULANATE POTASSIUM 1 TABLET: 875; 125 TABLET, FILM COATED ORAL at 23:08

## 2024-11-19 RX ADMIN — CYCLOBENZAPRINE 10 MG: 10 TABLET, FILM COATED ORAL at 23:08

## 2024-11-19 RX ADMIN — ACETAMINOPHEN 650 MG: 325 TABLET ORAL at 23:08

## 2024-11-19 ASSESSMENT — PAIN SCALES - GENERAL: PAINLEVEL_OUTOF10: 10

## 2024-11-20 ENCOUNTER — HOSPITAL ENCOUNTER (EMERGENCY)
Age: 34
Discharge: HOME | End: 2024-11-20
Attending: EMERGENCY MEDICINE
Payer: COMMERCIAL

## 2024-11-20 VITALS
TEMPERATURE: 98.2 F | SYSTOLIC BLOOD PRESSURE: 149 MMHG | DIASTOLIC BLOOD PRESSURE: 88 MMHG | RESPIRATION RATE: 18 BRPM | BODY MASS INDEX: 28.18 KG/M2 | HEART RATE: 109 BPM | OXYGEN SATURATION: 100 % | WEIGHT: 179.9 LBS

## 2024-11-20 DIAGNOSIS — F41.1 GENERALIZED ANXIETY DISORDER: Primary | ICD-10-CM

## 2024-11-20 PROCEDURE — 99282 EMERGENCY DEPT VISIT SF MDM: CPT

## 2024-11-20 ASSESSMENT — PAIN - FUNCTIONAL ASSESSMENT: PAIN_FUNCTIONAL_ASSESSMENT: NONE - DENIES PAIN

## 2024-11-20 NOTE — ED NOTES
Pt denies any medical issues at this time. Pt is in the ED today for a refill on Xanax 0.5 mg tablets. Pt is alert and oriented x4 and has no other concerns at this time.

## 2024-11-20 NOTE — ED PROVIDER NOTES
[facility] ED  [address] [facility] [address]  Dept: [phone]  Loc: [phone]  eMERGENCYdEPARTMENT eNCOUnter      Pt Name: Jose Kaplan  MRN: [id number]  Birthdate 1990of evaluation: 11/19/2024  Provider:MASSIEL JOVEL - CNP    CHIEF COMPLAINT       Chief Complaint   Patient presents with    Medication Refill     Out of flexeril and xanax    Dental Pain     L         HISTORY OF PRESENT ILLNESS  (Location/Symptom, Timing/Onset, Context/Setting, Quality, Duration,Modifying Factors, Severity.)   Jose Kaplan is a 34 y.o. male who presents to the emergency department with pain to the left lower molar.  Patient states this began about 2 days ago.  He has a cracked left lower molar and began having pain in the jaw on the left.  No fever or chills.  No nausea or vomiting.  No facial pain.    Patient is also requesting refills of his medications.  He is requesting Flexeril and Xanax.    Nursing Notes were reviewedand agreed with or any disagreements were addressed in the HPI.    REVIEW OF SYSTEMS    (2-9 systems for level 4, 10 or more for level 5)     Review of Systems   HENT:  Positive for dental problem.        Except as noted above the remainder of the review of systems was reviewed and negative.       PAST MEDICAL HISTORY         Diagnosis Date    Anxiety     Arthritis     Asthma     Bipolar I disorder, most recent episode (or current) depressed, unspecified 9/12/2014    Clostridium difficile infection     COPD (chronic obstructive pulmonary disease) ([facility])     Depression     Disease of blood and blood forming organ     Eczema     Fracture, metacarpal     R 4th and 5th    Gastric ulcer     Gastritis 06/13/2018    GERD (gastroesophageal reflux disease)     GI bleed     H. 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

## 2024-11-21 NOTE — ED PROVIDER NOTES
[facility]  Emergency Medicine Department    Pt Name: Jose Kaplan  MRN: [id number]  Birthdate 1990  Date of evaluation: 11/20/2024  Provider: Viral Lees MD    CHIEF COMPLAINT     Chief Complaint   Patient presents with    Medication Refill     Pt requesting alprazolam 0.5mg. States his other medications are ready at the pharmacy here. States he wants them because he is leaving for a shelter in [place] later today.       HISTORY OF PRESENT ILLNESS  (Location/Symptom, Timing/Onset, Context/Setting,Quality, Duration, Modifying Factors, Severity.)   Jose Kaplan is a 34 y.o. male who presents to the emergency department requesting refills of his Xanax.  He is well-known to this department with recurrent presentations requesting medication refills.  He has no medical complaints at this time.    Nursing Notes were reviewed.    ALLERGIES     Dicyclomine, Famotidine, Geodon [ziprasidone hcl], Haloperidol, Iv dye [iodides], Reglan [metoclopramide], Ibuprofen, Aspirin, Dicyclomine hcl, Hydroxyzine hcl, Iodine, Metronidazole, Mirtazapine, Ondansetron hcl, Promethazine, and Ziprasidone    CURRENT MEDICATIONS       Discharge Medication List as of 11/20/2024 10:40 AM        CONTINUE these medications which have NOT CHANGED    Details   amoxicillin-clavulanate (AUGMENTIN) 875-125 MG per tablet Take 1 tablet by mouth 2 times daily for 7 days, Disp-14 tablet, R-0Normal      cyclobenzaprine (FLEXERIL) 5 MG tablet Take 1 tablet by mouth 2 times daily as needed for Muscle spasms, Disp-10 tablet, R-0Normal      ibuprofen (ADVIL;MOTRIN) 600 MG tablet Take 1 tablet by mouth every 6 hours as needed for Pain, Disp-15 tablet, R-0Normal      ALPRAZolam (XANAX) 0.5 MG tablet Take 1 tablet by mouth 3 times daily as needed for Anxiety for up to 30 days. Max Daily Amount: 1.5 mg, Disp-6 tablet, R-0Normal      acetaminophen (TYLENOL) 500 MG tablet Take 2 tablets by mouth 3 times daily as needed for Pain, Disp-30

## 2024-12-18 NOTE — ED PROVIDER NOTES
[facility] ED  Emergency Department Encounter  Emergency Medicine Resident     Pt Joseph Fitzgerald Maryam Rainey  MRN: [id number]  [place] 1990  Date of evaluation: 5/19/23  PCP:  No primary care provider on file. Note Started: 3:02 AM EDT      CHIEF COMPLAINT       Chief Complaint   Patient presents with    Foot Pain       HISTORY OF PRESENT ILLNESS  (Location/Symptom, Timing/Onset, Context/Setting, Quality, Duration, Modifying Factors, Severity.)      Armando Jean is a 28 y.o. male who presents with complaints of dry skin on his feet. Patient is well known to this emergency department. Patient has no other medical complaints at this time. Denies fevers, difficulty bearing weight, peeling skin. PAST MEDICAL / SURGICAL / SOCIAL / FAMILY HISTORY      has a past medical history of Anxiety, Arthritis, Asthma, Bipolar I disorder, most recent episode (or current) depressed, unspecified, Clostridium difficile infection, COPD (chronic obstructive pulmonary disease) ([address] Depression, Disease of blood and blood forming organ, Eczema, Fracture, metacarpal, Gastric ulcer, Gastritis, GERD (gastroesophageal reflux disease), GI bleed, H. pylori infection, H/O blood clots, Head injury, Headache, Insomnia, Juvenile rheumatoid arthritis ([address] Neuromuscular disorder ([address] PFAPA syndrome ([address] PUD (peptic ulcer disease), Rheumatoid arthritis ([address] Rheumatoid arthritis(714.0), Severe recurrent major depression without psychotic features ([address] Sleep apnea, Still's disease ([address] Substance abuse ([address] Suicidal ideation, Suicide attempt by hanging ([address] Tobacco dependence, Ulcerative colitis ([address] and UTI (urinary tract infection). has a past surgical history that includes Colonoscopy; bronchoscopy; other surgical history; Upper gastrointestinal endoscopy (2/4/16); pr egd transoral biopsy single/multiple (N/A, 3/20/2017); sigmoidoscopy (N/A, 3/20/2017);  Cholecystectomy, laparoscopic (07/14/2017); pr none

## 2025-01-10 NOTE — ED NOTES
Continue current dosing of gabapentin for control of neuropathy.          Patient is resting in bed with easy and unlabored respirations. Call light in reach. Side rails up x2. Patient denies further complaints or concerns. Will monitor.         Rhonda Mccarthy RN  06/13/21 [id number]

## (undated) DEVICE — Device

## (undated) DEVICE — CLIPPING DEVICE: Brand: RESOLUTION CLIP

## (undated) DEVICE — FORCEPS BX L240CM WRK CHN 2.8MM STD CAP W/ NDL MIC MESH

## (undated) DEVICE — SNARE ENDOSCP M L240CM LOOP W27MM SHTH DIA2.4MM OVL FLX

## (undated) DEVICE — FORCEPS BX L240CM JAW DIA2.4MM ORNG L CAP W/ NDL DISP RAD

## (undated) DEVICE — Z DUPLICATE USE 2527422 TUBING O2 STD 7 FT EXTN NO CRUSH VISUAL CNTRST LF

## (undated) DEVICE — JELLY,LUBE,STERILE,FLIP TOP,TUBE,2-OZ: Brand: MEDLINE

## (undated) DEVICE — BITEBLOCK 54FR W/ DENT RIM BLOX

## (undated) DEVICE — Z DISCONTINUED USE 2424143 ADAPTER O2 SWVL CHRISTMAS TREE GRN

## (undated) DEVICE — SINGLE-USE BIOPSY FORCEPS: Brand: RADIAL JAW 4

## (undated) DEVICE — DEFENDO AIR WATER SUCTION AND BIOPSY VALVE KIT FOR  OLYMPUS: Brand: DEFENDO AIR/WATER/SUCTION AND BIOPSY VALVE

## (undated) DEVICE — CANNULA NSL AD TBNG L7FT PVC STR NONFLARED PRNG O2 DEL W STD

## (undated) DEVICE — Z INACTIVE USE 2525529 CONNECTOR TBNG FOR O2

## (undated) DEVICE — GOWN,POLY REINFORCED,LG: Brand: MEDLINE

## (undated) DEVICE — NEEDLE SCLERO 25GA L240CM OD0.51MM ID0.24MM EXTN L4MM SHTH

## (undated) DEVICE — FORCEPS BX L240CM JAW DIA22MM ORNG STD CAP W NDL RAD JAW 4

## (undated) DEVICE — GLOVE ORANGE PI 8   MSG9080